# Patient Record
Sex: MALE | Race: WHITE | NOT HISPANIC OR LATINO | Employment: OTHER | URBAN - METROPOLITAN AREA
[De-identification: names, ages, dates, MRNs, and addresses within clinical notes are randomized per-mention and may not be internally consistent; named-entity substitution may affect disease eponyms.]

---

## 2017-01-09 RX ORDER — GABAPENTIN 300 MG/1
CAPSULE ORAL
Qty: 180 CAPSULE | Refills: 0 | Status: SHIPPED | OUTPATIENT
Start: 2017-01-09 | End: 2017-04-16 | Stop reason: SDUPTHER

## 2017-01-10 RX ORDER — DILTIAZEM HYDROCHLORIDE 120 MG/1
120 CAPSULE, COATED, EXTENDED RELEASE ORAL DAILY
Qty: 30 CAPSULE | Refills: 5 | Status: SHIPPED | OUTPATIENT
Start: 2017-01-10 | End: 2017-07-10 | Stop reason: SDUPTHER

## 2017-01-23 RX ORDER — PRAVASTATIN SODIUM 40 MG/1
TABLET ORAL
Qty: 90 TABLET | Refills: 0 | Status: SHIPPED | OUTPATIENT
Start: 2017-01-23 | End: 2017-04-16 | Stop reason: SDUPTHER

## 2017-02-07 RX ORDER — CELECOXIB 200 MG/1
200 CAPSULE ORAL 2 TIMES DAILY
Qty: 180 CAPSULE | Refills: 1 | Status: SHIPPED | OUTPATIENT
Start: 2017-02-07 | End: 2018-01-05 | Stop reason: SDUPTHER

## 2017-03-17 RX ORDER — LOSARTAN POTASSIUM 25 MG/1
TABLET ORAL
Qty: 90 TABLET | Refills: 0 | Status: SHIPPED | OUTPATIENT
Start: 2017-03-17 | End: 2017-06-11 | Stop reason: SDUPTHER

## 2017-04-17 RX ORDER — GABAPENTIN 300 MG/1
CAPSULE ORAL
Qty: 180 CAPSULE | Refills: 0 | Status: SHIPPED | OUTPATIENT
Start: 2017-04-17 | End: 2017-07-07 | Stop reason: SDUPTHER

## 2017-04-17 RX ORDER — PRAVASTATIN SODIUM 40 MG/1
TABLET ORAL
Qty: 90 TABLET | Refills: 0 | Status: SHIPPED | OUTPATIENT
Start: 2017-04-17 | End: 2017-07-30 | Stop reason: SDUPTHER

## 2017-05-01 ENCOUNTER — TELEPHONE (OUTPATIENT)
Dept: FAMILY MEDICINE | Facility: CLINIC | Age: 72
End: 2017-05-01

## 2017-05-01 DIAGNOSIS — I10 ESSENTIAL HYPERTENSION: ICD-10-CM

## 2017-05-01 DIAGNOSIS — Z79.4 TYPE 2 DIABETES MELLITUS WITH HYPERGLYCEMIA, WITH LONG-TERM CURRENT USE OF INSULIN: Primary | ICD-10-CM

## 2017-05-01 DIAGNOSIS — E11.65 TYPE 2 DIABETES MELLITUS WITH HYPERGLYCEMIA, WITH LONG-TERM CURRENT USE OF INSULIN: Primary | ICD-10-CM

## 2017-05-01 NOTE — TELEPHONE ENCOUNTER
----- Message from María eHnnessy sent at 5/1/2017 10:39 AM CDT -----  Contact: self/[phone]  Patient would like blood work orders but in the system for his upcoming appointment.  Please advise.

## 2017-05-08 ENCOUNTER — LAB VISIT (OUTPATIENT)
Dept: LAB | Facility: HOSPITAL | Age: 72
End: 2017-05-08
Attending: FAMILY MEDICINE
Payer: MEDICARE

## 2017-05-08 DIAGNOSIS — Z79.4 TYPE 2 DIABETES MELLITUS WITH HYPERGLYCEMIA, WITH LONG-TERM CURRENT USE OF INSULIN: ICD-10-CM

## 2017-05-08 DIAGNOSIS — I10 ESSENTIAL HYPERTENSION: ICD-10-CM

## 2017-05-08 DIAGNOSIS — E11.65 TYPE 2 DIABETES MELLITUS WITH HYPERGLYCEMIA, WITH LONG-TERM CURRENT USE OF INSULIN: ICD-10-CM

## 2017-05-08 LAB
ALBUMIN SERPL BCP-MCNC: 3.4 G/DL
ALP SERPL-CCNC: 95 U/L
ALT SERPL W/O P-5'-P-CCNC: 14 U/L
ANION GAP SERPL CALC-SCNC: 6 MMOL/L
AST SERPL-CCNC: 19 U/L
BASOPHILS # BLD AUTO: 0.04 K/UL
BASOPHILS NFR BLD: 0.7 %
BILIRUB SERPL-MCNC: 0.8 MG/DL
BUN SERPL-MCNC: 16 MG/DL
CALCIUM SERPL-MCNC: 8.9 MG/DL
CHLORIDE SERPL-SCNC: 106 MMOL/L
CHOLEST/HDLC SERPL: 2.3 {RATIO}
CO2 SERPL-SCNC: 29 MMOL/L
CREAT SERPL-MCNC: 1.1 MG/DL
DIFFERENTIAL METHOD: NORMAL
EOSINOPHIL # BLD AUTO: 0.3 K/UL
EOSINOPHIL NFR BLD: 4.9 %
ERYTHROCYTE [DISTWIDTH] IN BLOOD BY AUTOMATED COUNT: 12.7 %
EST. GFR  (AFRICAN AMERICAN): >60 ML/MIN/1.73 M^2
EST. GFR  (NON AFRICAN AMERICAN): >60 ML/MIN/1.73 M^2
GLUCOSE SERPL-MCNC: 150 MG/DL
HCT VFR BLD AUTO: 47.6 %
HDL/CHOLESTEROL RATIO: 43.6 %
HDLC SERPL-MCNC: 156 MG/DL
HDLC SERPL-MCNC: 68 MG/DL
HGB BLD-MCNC: 15.4 G/DL
LDLC SERPL CALC-MCNC: 69.6 MG/DL
LYMPHOCYTES # BLD AUTO: 1.8 K/UL
LYMPHOCYTES NFR BLD: 29.1 %
MCH RBC QN AUTO: 28.3 PG
MCHC RBC AUTO-ENTMCNC: 32.4 %
MCV RBC AUTO: 87 FL
MONOCYTES # BLD AUTO: 0.7 K/UL
MONOCYTES NFR BLD: 10.6 %
NEUTROPHILS # BLD AUTO: 3.3 K/UL
NEUTROPHILS NFR BLD: 54.5 %
NONHDLC SERPL-MCNC: 88 MG/DL
PLATELET # BLD AUTO: 212 K/UL
PMV BLD AUTO: 10.3 FL
POTASSIUM SERPL-SCNC: 4.3 MMOL/L
PROT SERPL-MCNC: 7 G/DL
RBC # BLD AUTO: 5.45 M/UL
SODIUM SERPL-SCNC: 141 MMOL/L
TRIGL SERPL-MCNC: 92 MG/DL
TSH SERPL DL<=0.005 MIU/L-ACNC: 1.02 UIU/ML
WBC # BLD AUTO: 6.12 K/UL

## 2017-05-08 PROCEDURE — 84443 ASSAY THYROID STIM HORMONE: CPT

## 2017-05-08 PROCEDURE — 36415 COLL VENOUS BLD VENIPUNCTURE: CPT | Mod: PO

## 2017-05-08 PROCEDURE — 80053 COMPREHEN METABOLIC PANEL: CPT

## 2017-05-08 PROCEDURE — 83036 HEMOGLOBIN GLYCOSYLATED A1C: CPT

## 2017-05-08 PROCEDURE — 80061 LIPID PANEL: CPT

## 2017-05-08 PROCEDURE — 85025 COMPLETE CBC W/AUTO DIFF WBC: CPT

## 2017-05-09 LAB
ESTIMATED AVG GLUCOSE: 232 MG/DL
HBA1C MFR BLD HPLC: 9.7 %

## 2017-05-12 ENCOUNTER — OFFICE VISIT (OUTPATIENT)
Dept: FAMILY MEDICINE | Facility: CLINIC | Age: 72
End: 2017-05-12
Payer: MEDICARE

## 2017-05-12 VITALS
SYSTOLIC BLOOD PRESSURE: 112 MMHG | OXYGEN SATURATION: 98 % | DIASTOLIC BLOOD PRESSURE: 72 MMHG | HEIGHT: 75 IN | BODY MASS INDEX: 27.55 KG/M2 | WEIGHT: 221.56 LBS | HEART RATE: 96 BPM

## 2017-05-12 DIAGNOSIS — Z79.4 TYPE 2 DIABETES MELLITUS WITH HYPERGLYCEMIA, WITH LONG-TERM CURRENT USE OF INSULIN: ICD-10-CM

## 2017-05-12 DIAGNOSIS — E11.65 TYPE 2 DIABETES MELLITUS WITH HYPERGLYCEMIA, WITH LONG-TERM CURRENT USE OF INSULIN: ICD-10-CM

## 2017-05-12 DIAGNOSIS — I10 ESSENTIAL HYPERTENSION: Primary | ICD-10-CM

## 2017-05-12 DIAGNOSIS — M26.69 TMJ LOCKING: ICD-10-CM

## 2017-05-12 DIAGNOSIS — J32.0 CHRONIC MAXILLARY SINUSITIS: ICD-10-CM

## 2017-05-12 PROCEDURE — 4010F ACE/ARB THERAPY RXD/TAKEN: CPT | Mod: S$GLB,,, | Performed by: FAMILY MEDICINE

## 2017-05-12 PROCEDURE — 99214 OFFICE O/P EST MOD 30 MIN: CPT | Mod: S$GLB,,, | Performed by: FAMILY MEDICINE

## 2017-05-12 PROCEDURE — 3078F DIAST BP <80 MM HG: CPT | Mod: S$GLB,,, | Performed by: FAMILY MEDICINE

## 2017-05-12 PROCEDURE — 99499 UNLISTED E&M SERVICE: CPT | Mod: S$GLB,,, | Performed by: FAMILY MEDICINE

## 2017-05-12 PROCEDURE — 1126F AMNT PAIN NOTED NONE PRSNT: CPT | Mod: S$GLB,,, | Performed by: FAMILY MEDICINE

## 2017-05-12 PROCEDURE — 1160F RVW MEDS BY RX/DR IN RCRD: CPT | Mod: S$GLB,,, | Performed by: FAMILY MEDICINE

## 2017-05-12 PROCEDURE — 99999 PR PBB SHADOW E&M-EST. PATIENT-LVL III: CPT | Mod: PBBFAC,,, | Performed by: FAMILY MEDICINE

## 2017-05-12 PROCEDURE — 3074F SYST BP LT 130 MM HG: CPT | Mod: S$GLB,,, | Performed by: FAMILY MEDICINE

## 2017-05-12 PROCEDURE — 1159F MED LIST DOCD IN RCRD: CPT | Mod: S$GLB,,, | Performed by: FAMILY MEDICINE

## 2017-05-12 PROCEDURE — 3046F HEMOGLOBIN A1C LEVEL >9.0%: CPT | Mod: S$GLB,,, | Performed by: FAMILY MEDICINE

## 2017-05-12 RX ORDER — AZITHROMYCIN 500 MG/1
500 TABLET, FILM COATED ORAL DAILY
Qty: 3 TABLET | Refills: 0 | Status: SHIPPED | OUTPATIENT
Start: 2017-05-12 | End: 2017-05-15

## 2017-05-12 RX ORDER — LEVOCETIRIZINE DIHYDROCHLORIDE 5 MG/1
5 TABLET, FILM COATED ORAL NIGHTLY
Qty: 30 TABLET | Refills: 0 | Status: SHIPPED | OUTPATIENT
Start: 2017-05-12 | End: 2021-05-26

## 2017-05-12 RX ORDER — INSULIN ASPART 100 [IU]/ML
20 INJECTION, SOLUTION INTRAVENOUS; SUBCUTANEOUS
Qty: 18 ML | Refills: 11 | Status: SHIPPED | OUTPATIENT
Start: 2017-05-12 | End: 2017-08-11 | Stop reason: SDUPTHER

## 2017-05-12 NOTE — PROGRESS NOTES
Subjective:       Patient ID: Kamar Muñoz is a 71 y.o. male.    Chief Complaint: Follow-up; Diabetes; and Hypertension    HPI Comments: 71 years old male came to the clinic for blood pressure check.  Blood pressure today stable.  Patient last A1c was elevated.  No polyuria polydipsia polyphagia.  Patient with chronic maxillary sinusitis associated with sinus pressure and general malaise.  Patient was using over-the-counter medicine.  No significant improvement for the last several months.  Patient also with TMJ locking sometimes.    Diabetes   Pertinent negatives for diabetes include no chest pain, no polydipsia, no polyphagia and no polyuria.   Hypertension   Pertinent negatives include no chest pain or palpitations.     Review of Systems   Constitutional: Negative.  Negative for chills and fever.   HENT: Positive for postnasal drip, rhinorrhea, sinus pressure and sneezing.    Eyes: Negative.    Respiratory: Negative.    Cardiovascular: Negative.  Negative for chest pain, palpitations and leg swelling.   Gastrointestinal: Negative.    Endocrine: Negative for cold intolerance, heat intolerance, polydipsia, polyphagia and polyuria.   Genitourinary: Negative.    Musculoskeletal: Negative.    Skin: Negative.    Neurological: Negative.    Psychiatric/Behavioral: Negative.        Objective:      Physical Exam   Constitutional: He is oriented to person, place, and time. He appears well-developed and well-nourished. No distress.   HENT:   Head: Normocephalic and atraumatic.   Right Ear: External ear normal.   Left Ear: External ear normal.   Nose: Rhinorrhea present. Right sinus exhibits no maxillary sinus tenderness and no frontal sinus tenderness. Left sinus exhibits maxillary sinus tenderness. Left sinus exhibits no frontal sinus tenderness.   Mouth/Throat: Oropharynx is clear and moist. No oropharyngeal exudate.   Eyes: Conjunctivae and EOM are normal. Pupils are equal, round, and reactive to light. Right eye  exhibits no discharge. Left eye exhibits no discharge. No scleral icterus.   Neck: Normal range of motion. Neck supple. No JVD present. No tracheal deviation present. No thyromegaly present.   Cardiovascular: Normal rate, regular rhythm, normal heart sounds and intact distal pulses.  Exam reveals no gallop and no friction rub.    No murmur heard.  Pulmonary/Chest: Effort normal and breath sounds normal. No stridor. No respiratory distress. He has no wheezes. He has no rales. He exhibits no tenderness.   Abdominal: Soft. Bowel sounds are normal. He exhibits no distension and no mass. There is no tenderness. There is no rebound and no guarding.   Musculoskeletal: Normal range of motion. He exhibits no edema or tenderness.   Lymphadenopathy:     He has no cervical adenopathy.   Neurological: He is alert and oriented to person, place, and time. He has normal reflexes. He displays normal reflexes. No cranial nerve deficit. He exhibits normal muscle tone. Coordination normal.   Skin: Skin is warm and dry. No rash noted. He is not diaphoretic. No erythema. No pallor.   Psychiatric: He has a normal mood and affect. His behavior is normal. Judgment and thought content normal.   Nursing note and vitals reviewed.      Assessment:       1. Essential hypertension    2. Type 2 diabetes mellitus with hyperglycemia, with long-term current use of insulin    3. TMJ locking    4. Chronic maxillary sinusitis        Plan:         Kamar was seen today for follow-up, diabetes and hypertension.    Diagnoses and all orders for this visit:    Essential hypertension    Type 2 diabetes mellitus with hyperglycemia, with long-term current use of insulin  -     insulin aspart (NOVOLOG FLEXPEN) 100 unit/mL InPn pen; Inject 20 Units into the skin 3 (three) times daily with meals.    TMJ locking  -     Ambulatory Referral to Oral Maxillofacial Surgery    Chronic maxillary sinusitis  -     azithromycin (ZITHROMAX) 500 MG tablet; Take 1 tablet (500  mg total) by mouth once daily.  -     levocetirizine (XYZAL) 5 MG tablet; Take 1 tablet (5 mg total) by mouth every evening.    Continue monitoring blood pressure at home, low sodium diet.  Continue monitoring blood sugar at home,ADA diet.

## 2017-05-12 NOTE — MR AVS SNAPSHOT
[facility]   [facility] [place] [address]  Phone: [phone]  Fax: [phone]                  Kamar Muñoz   2017 10:40 AM   Office Visit    Description:  Male : 1945   Provider:  Basim Guerrero MD   Department:  [facility]           Reason for Visit     Follow-up     Diabetes     Hypertension           Diagnoses this Visit        Comments    Essential hypertension    -  Primary     Type 2 diabetes mellitus with hyperglycemia, with long-term current use of insulin         TMJ locking         Chronic maxillary sinusitis                To Do List           Future Appointments        Provider Department Dept Phone    8/10/2017 9:00 AM LAB, [facility] [phone]    2017 10:00 AM Basim Guerrero MD [facility] [phone]      Goals (5 Years of Data)     None      Follow-Up and Disposition     Return in about 3 months (around 2017), or if symptoms worsen or fail to improve.       These Medications        Disp Refills Start End    insulin aspart (NOVOLOG FLEXPEN) 100 unit/mL InPn pen 18 mL 11 2017    Inject 20 Units into the skin 3 (three) times daily with meals. - Subcutaneous    Pharmacy: [facility] [address] [place] [address] [address] [facility] Ph #: [phone]       azithromycin (ZITHROMAX) 500 MG tablet 3 tablet 0 2017 5/15/2017    Take 1 tablet (500 mg total) by mouth once daily. - Oral    [facility] [address] [place] [address] [address] AT [facility] Ph #: [phone]       levocetirizine (XYZAL) 5 MG tablet 30 tablet 0 2017    Take 1 tablet (5 mg total) by mouth every evening. - Oral    Pharmacy: [facility] [address] [place], [place] [address] [address] [address] AT [facility] Ph #: [phone]         [facility]     [facility] Nurse Care Line - 24/7 Assistance  Unless otherwise directed by your provider, please contact [facility], our nurse care line that is available for 24/7 assistance.     Registered nurses in the [facility] provide: appointment scheduling, clinical advisement, health education, and other advisory services.  Call: [phone] (toll free)               Medications           Message regarding Medications     Verify the changes and/or additions to your medication regime listed below are the same as discussed with your clinician today.  If any of these changes or additions are incorrect, please notify your healthcare provider.        START taking these NEW medications        Refills    azithromycin (ZITHROMAX) 500 MG tablet 0    Sig: Take 1 tablet (500 mg total) by mouth once daily.    Class: Normal    Route: Oral    levocetirizine (XYZAL) 5 MG tablet 0    Sig: Take 1 tablet (5 mg total) by mouth every evening.    Class: Normal    Route: Oral      CHANGE how you are taking these medications     Start Taking Instead of    insulin aspart (NOVOLOG FLEXPEN) 100 unit/mL InPn pen insulin aspart (NOVOLOG FLEXPEN) 100 unit/mL InPn pen    Dosage:  Inject 20 Units into the skin 3 (three) times daily with meals. Dosage:  Inject 15 Units into the skin 3 (three) times daily with meals.    Reason for Change:  Reorder       STOP taking these medications     cetirizine (ZYRTEC) 10 MG tablet Take 1 tablet (10 mg total) by mouth once daily.           Verify that the below list of medications is an accurate representation of the medications you are currently taking.  If none reported, the list may be blank. If incorrect, please contact your healthcare provider. Carry this list with you in case of emergency.           Current Medications     aspirin (ECOTRIN) 81 MG EC tablet Take 81 mg by mouth once daily.    azithromycin (ZITHROMAX) 500 MG tablet Take 1 tablet (500 mg total) by mouth once daily.    celecoxib  "(CELEBREX) 200 MG capsule Take 1 capsule (200 mg total) by mouth 2 (two) times daily.    diltiaZEM (CARTIA XT) 120 MG Cp24 Take 1 capsule (120 mg total) by mouth once daily.    ergocalciferol (ERGOCALCIFEROL) 50,000 unit Cap     gabapentin (NEURONTIN) 300 MG capsule TAKE 1 CAPSULE BY MOUTH TWICE DAILY    hydrocodone-acetaminophen 10-325mg (NORCO)  mg Tab Take 1 tablet by mouth every 4 (four) hours as needed.    insulin aspart (NOVOLOG FLEXPEN) 100 unit/mL InPn pen Inject 20 Units into the skin 3 (three) times daily with meals.    insulin glargine (LANTUS) 100 unit/mL injection Inject 30 Units into the skin every evening.    insulin syringe-needle U-100 (INSULIN SYRINGE MICROFINE) 0.3 mL 28 gauge x 1/2" Syrg Use with Lantus    levocetirizine (XYZAL) 5 MG tablet Take 1 tablet (5 mg total) by mouth every evening.    losartan (COZAAR) 25 MG tablet TAKE 1 TABLET BY MOUTH DAILY    metformin (GLUCOPHAGE) 500 MG tablet Take 1 tablet (500 mg total) by mouth 2 (two) times daily with meals.    MULTIVITAMIN ORAL 1 tablet.    OMEPRAZOLE (PRILOSEC ORAL) Take 1 tablet by mouth daily as needed.     pen needle, diabetic (PEN NEEDLE) 30 gauge x 5/16" Ndle 1 Units by Misc.(Non-Drug; Combo Route) route 3 (three) times daily.    polycarbophil (FIBERCON) 625 mg tablet Take 1 tablet by mouth.    pravastatin (PRAVACHOL) 40 MG tablet TAKE 1 TABLET BY MOUTH EVERY DAY           Clinical Reference Information           Your Vitals Were     BP Pulse Height Weight SpO2 BMI    112/72 96 6' 3" (1.905 m) 100.5 kg (221 lb 9 oz) 98% 27.69 kg/m2      Blood Pressure          Most Recent Value    BP  112/72      Allergies as of 5/12/2017     Iodine      Immunizations Administered on Date of Encounter - 5/12/2017     None      Orders Placed During Today's Visit      Normal Orders This Visit    Ambulatory Referral to Oral Maxillofacial Surgery     Future Labs/Procedures Expected by Expires    Comprehensive metabolic panel  5/12/2017 8/10/2017    " Hemoglobin A1c  5/12/2017 8/10/2017    Lipid panel  5/12/2017 8/10/2017      [facility] Sign-Up     Activating your [facility] account is as easy as 1-2-3!     1) Visit [email or web address], select Sign Up Now, enter this activation code and your date of birth, then select Next.  [email or web address]  Expires: 6/26/2017 11:34 AM      2) Create a username and password to use when you visit [facility] in the future and select a security question in case you lose your password and select Next.    3) Enter your e-mail address and click Sign Up!    Additional Information  If you have questions, please e-mail [email or web address] or call [phone] to talk to our [facility] staff. Remember, [facility] is NOT to be used for urgent needs. For medical emergencies, dial 911.         Language Assistance Services     ATTENTION: Language assistance services are available, free of charge. Please call [phone].      ATENCIÓN: Si habla español, tiene a melendez disposición servicios gratuitos de asistencia lingüística. Llame al [phone].     CHÚ Ý: N?u b?n nói Ti?ng Vi?t, có các d?ch v? h? tr? ngôn ng? mi?n phí dành cho b?n. G?i s? [phone].         [facility] complies with applicable Federal civil rights laws and does not discriminate on the basis of race, color, national origin, age, disability, or sex.

## 2017-06-12 RX ORDER — LOSARTAN POTASSIUM 25 MG/1
TABLET ORAL
Qty: 90 TABLET | Refills: 0 | Status: SHIPPED | OUTPATIENT
Start: 2017-06-12 | End: 2017-09-07 | Stop reason: SDUPTHER

## 2017-07-07 RX ORDER — GABAPENTIN 300 MG/1
CAPSULE ORAL
Qty: 180 CAPSULE | Refills: 0 | Status: SHIPPED | OUTPATIENT
Start: 2017-07-07 | End: 2017-10-06 | Stop reason: SDUPTHER

## 2017-07-10 RX ORDER — DILTIAZEM HYDROCHLORIDE 120 MG/1
120 CAPSULE, COATED, EXTENDED RELEASE ORAL DAILY
Qty: 30 CAPSULE | Refills: 5 | Status: SHIPPED | OUTPATIENT
Start: 2017-07-10 | End: 2018-01-05 | Stop reason: SDUPTHER

## 2017-07-10 NOTE — TELEPHONE ENCOUNTER
----- Message from Judi Dee sent at 7/10/2017  4:43 PM CDT -----  Contact: Self/[phone]  Patient is requesting a refill on his diltiaZEM (CARTIA XT) 120 MG Cp24 sent to the pharmacy on file. .  Please advise

## 2017-07-10 NOTE — TELEPHONE ENCOUNTER
----- Message from Judi Dee sent at 7/10/2017  4:43 PM CDT -----  Contact: Self/[phone]  Patient is requesting a refill on his diltiaZEM (CARTIA XT) 120 MG Cp24 sent to the pharmacy on file. .  Please advise

## 2017-07-13 ENCOUNTER — HOSPITAL ENCOUNTER (EMERGENCY)
Facility: HOSPITAL | Age: 72
Discharge: HOME | End: 2017-07-13
Attending: EMERGENCY MEDICINE
Payer: MEDICARE

## 2017-07-13 ENCOUNTER — TELEPHONE (OUTPATIENT)
Dept: UROLOGY | Facility: CLINIC | Age: 72
End: 2017-07-13

## 2017-07-13 VITALS
DIASTOLIC BLOOD PRESSURE: 81 MMHG | RESPIRATION RATE: 18 BRPM | SYSTOLIC BLOOD PRESSURE: 158 MMHG | TEMPERATURE: 98 F | BODY MASS INDEX: 27.1 KG/M2 | HEART RATE: 75 BPM | HEIGHT: 75 IN | WEIGHT: 218 LBS | OXYGEN SATURATION: 97 %

## 2017-07-13 DIAGNOSIS — N20.0 RENAL STONES: Primary | ICD-10-CM

## 2017-07-13 DIAGNOSIS — R10.9 RIGHT FLANK PAIN: ICD-10-CM

## 2017-07-13 LAB
ALBUMIN SERPL BCP-MCNC: 3.5 G/DL
ALP SERPL-CCNC: 95 U/L
ALT SERPL W/O P-5'-P-CCNC: 12 U/L
ANION GAP SERPL CALC-SCNC: 8 MMOL/L
AST SERPL-CCNC: 20 U/L
BASOPHILS # BLD AUTO: 0.03 K/UL
BASOPHILS NFR BLD: 0.4 %
BILIRUB SERPL-MCNC: 0.7 MG/DL
BILIRUB UR QL STRIP: NEGATIVE
BUN SERPL-MCNC: 16 MG/DL
CALCIUM SERPL-MCNC: 8.8 MG/DL
CHLORIDE SERPL-SCNC: 104 MMOL/L
CLARITY UR: CLEAR
CO2 SERPL-SCNC: 23 MMOL/L
COLOR UR: YELLOW
CREAT SERPL-MCNC: 1.1 MG/DL
DIFFERENTIAL METHOD: NORMAL
EOSINOPHIL # BLD AUTO: 0.1 K/UL
EOSINOPHIL NFR BLD: 1 %
ERYTHROCYTE [DISTWIDTH] IN BLOOD BY AUTOMATED COUNT: 12.2 %
EST. GFR  (AFRICAN AMERICAN): >60 ML/MIN/1.73 M^2
EST. GFR  (NON AFRICAN AMERICAN): >60 ML/MIN/1.73 M^2
GLUCOSE SERPL-MCNC: 170 MG/DL
GLUCOSE SERPL-MCNC: 190 MG/DL (ref 70–110)
GLUCOSE UR QL STRIP: NEGATIVE
HCT VFR BLD AUTO: 43.7 %
HGB BLD-MCNC: 14.2 G/DL
HGB UR QL STRIP: ABNORMAL
KETONES UR QL STRIP: NEGATIVE
LEUKOCYTE ESTERASE UR QL STRIP: NEGATIVE
LIPASE SERPL-CCNC: 19 U/L
LYMPHOCYTES # BLD AUTO: 1.8 K/UL
LYMPHOCYTES NFR BLD: 26.3 %
MCH RBC QN AUTO: 27.4 PG
MCHC RBC AUTO-ENTMCNC: 32.5 %
MCV RBC AUTO: 84 FL
MONOCYTES # BLD AUTO: 0.5 K/UL
MONOCYTES NFR BLD: 6.8 %
NEUTROPHILS # BLD AUTO: 4.4 K/UL
NEUTROPHILS NFR BLD: 65.4 %
NITRITE UR QL STRIP: NEGATIVE
PH UR STRIP: 6 [PH] (ref 5–8)
PLATELET # BLD AUTO: 178 K/UL
PMV BLD AUTO: 9.6 FL
POTASSIUM SERPL-SCNC: 4.8 MMOL/L
PROT SERPL-MCNC: 6.9 G/DL
PROT UR QL STRIP: NEGATIVE
RBC # BLD AUTO: 5.19 M/UL
SODIUM SERPL-SCNC: 135 MMOL/L
SP GR UR STRIP: 1.01 (ref 1–1.03)
URN SPEC COLLECT METH UR: ABNORMAL
UROBILINOGEN UR STRIP-ACNC: NEGATIVE EU/DL
WBC # BLD AUTO: 6.73 K/UL

## 2017-07-13 PROCEDURE — 85025 COMPLETE CBC W/AUTO DIFF WBC: CPT

## 2017-07-13 PROCEDURE — 96375 TX/PRO/DX INJ NEW DRUG ADDON: CPT

## 2017-07-13 PROCEDURE — 80053 COMPREHEN METABOLIC PANEL: CPT

## 2017-07-13 PROCEDURE — 96374 THER/PROPH/DIAG INJ IV PUSH: CPT

## 2017-07-13 PROCEDURE — 96376 TX/PRO/DX INJ SAME DRUG ADON: CPT

## 2017-07-13 PROCEDURE — 81003 URINALYSIS AUTO W/O SCOPE: CPT

## 2017-07-13 PROCEDURE — 99284 EMERGENCY DEPT VISIT MOD MDM: CPT | Mod: 25

## 2017-07-13 PROCEDURE — 63600175 PHARM REV CODE 636 W HCPCS: Performed by: NURSE PRACTITIONER

## 2017-07-13 PROCEDURE — 96372 THER/PROPH/DIAG INJ SC/IM: CPT

## 2017-07-13 PROCEDURE — 25000003 PHARM REV CODE 250: Performed by: NURSE PRACTITIONER

## 2017-07-13 PROCEDURE — 82962 GLUCOSE BLOOD TEST: CPT

## 2017-07-13 PROCEDURE — 83690 ASSAY OF LIPASE: CPT

## 2017-07-13 RX ORDER — HYDROCODONE BITARTRATE AND ACETAMINOPHEN 5; 325 MG/1; MG/1
1 TABLET ORAL EVERY 6 HOURS PRN
Qty: 10 TABLET | Refills: 0 | Status: SHIPPED | OUTPATIENT
Start: 2017-07-13 | End: 2018-11-26

## 2017-07-13 RX ORDER — HYDROMORPHONE HYDROCHLORIDE 1 MG/ML
0.5 INJECTION, SOLUTION INTRAMUSCULAR; INTRAVENOUS; SUBCUTANEOUS
Status: COMPLETED | OUTPATIENT
Start: 2017-07-13 | End: 2017-07-13

## 2017-07-13 RX ORDER — KETOROLAC TROMETHAMINE 10 MG/1
10 TABLET, FILM COATED ORAL 2 TIMES DAILY PRN
Qty: 20 TABLET | Refills: 0 | Status: SHIPPED | OUTPATIENT
Start: 2017-07-13 | End: 2018-04-16

## 2017-07-13 RX ORDER — MORPHINE SULFATE 2 MG/ML
4 INJECTION, SOLUTION INTRAMUSCULAR; INTRAVENOUS
Status: COMPLETED | OUTPATIENT
Start: 2017-07-13 | End: 2017-07-13

## 2017-07-13 RX ORDER — ONDANSETRON 2 MG/ML
4 INJECTION INTRAMUSCULAR; INTRAVENOUS
Status: COMPLETED | OUTPATIENT
Start: 2017-07-13 | End: 2017-07-13

## 2017-07-13 RX ORDER — KETOROLAC TROMETHAMINE 30 MG/ML
15 INJECTION, SOLUTION INTRAMUSCULAR; INTRAVENOUS
Status: COMPLETED | OUTPATIENT
Start: 2017-07-13 | End: 2017-07-13

## 2017-07-13 RX ORDER — HALOPERIDOL 5 MG/ML
5 INJECTION INTRAMUSCULAR
Status: COMPLETED | OUTPATIENT
Start: 2017-07-13 | End: 2017-07-13

## 2017-07-13 RX ADMIN — HYDROMORPHONE HYDROCHLORIDE 0.5 MG: 1 INJECTION, SOLUTION INTRAMUSCULAR; INTRAVENOUS; SUBCUTANEOUS at 04:07

## 2017-07-13 RX ADMIN — ONDANSETRON 4 MG: 2 INJECTION INTRAMUSCULAR; INTRAVENOUS at 03:07

## 2017-07-13 RX ADMIN — HALOPERIDOL LACTATE 5 MG: 5 INJECTION, SOLUTION INTRAMUSCULAR at 04:07

## 2017-07-13 RX ADMIN — ONDANSETRON 4 MG: 2 INJECTION INTRAMUSCULAR; INTRAVENOUS at 02:07

## 2017-07-13 RX ADMIN — MORPHINE SULFATE 4 MG: 2 INJECTION, SOLUTION INTRAMUSCULAR; INTRAVENOUS at 03:07

## 2017-07-13 RX ADMIN — KETOROLAC TROMETHAMINE 15 MG: 30 INJECTION, SOLUTION INTRAMUSCULAR at 02:07

## 2017-07-13 RX ADMIN — SODIUM CHLORIDE 1000 ML: 0.9 INJECTION, SOLUTION INTRAVENOUS at 03:07

## 2017-07-13 NOTE — TELEPHONE ENCOUNTER
----- Message from Willow Barksdale sent at 7/13/2017 12:35 PM CDT -----  Contact: self, [phone]  Patient states his wife called earlier today requesting for him to be seen today due to being in pain and has not received a call back. Patient states he will just go to the hospital.

## 2017-07-13 NOTE — ED PROVIDER NOTES
Encounter Date: 7/13/2017       History     Chief Complaint   Patient presents with    Flank Pain     right flank pain     71-year-old male reports right-sided flank pain times one week that worsened today.  Patient reports a history of kidney stones and states this feels similar.  Denies nausea or vomiting.  Denies burning or pain with urination.  Last kidney stone one year ago and lithotripsy one year ago by Dr. Daly.  Patient has not had any kidney problems since that time.  Denies history of kidney failure.  Patient denies abdominal pain, fever, chills, body aches.  Patient states he initially thought that the symptoms were due to a pulled muscle occasionally does lifting at work however pain seems to be intensified with rest.  Has not taken anything for pain.          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 8/2/2012    Type II or unspecified type diabetes mellitus with neurological manifestations, uncontrolled 3/8/2013    Urinary tract infection      Past Surgical History:   Procedure Laterality Date    BACK SURGERY      EYE SURGERY      HERNIA REPAIR      renal stones      SHOULDER OPEN ROTATOR CUFF REPAIR       Family History   Problem Relation Age of Onset    Prostate cancer Neg Hx     Kidney disease Neg Hx      Social History   Substance Use Topics    Smoking status: Former Smoker     Quit date: 1/1/1983    Smokeless tobacco: Never Used    Alcohol use No     Review of Systems   Constitutional: Negative for fever.   HENT: Negative for sore throat.    Respiratory: Negative for shortness of breath.    Cardiovascular: Negative for chest pain.   Gastrointestinal:  Negative for abdominal pain and nausea.   Genitourinary: Positive for flank pain. Negative for decreased urine volume, dysuria, hematuria, testicular pain and urgency.   Musculoskeletal: Negative for back pain.   Skin: Negative for rash.   Neurological: Negative for weakness.   Hematological: Does not bruise/bleed easily.   All other systems reviewed and are negative.      Physical Exam     Initial Vitals [07/13/17 1312]   BP Pulse Resp Temp SpO2   (!) 198/86 79 18 97.6 °F (36.4 °C) 97 %      MAP       123.33         Physical Exam    Nursing note and vitals reviewed.  Constitutional: He appears well-developed and well-nourished. He is not diaphoretic. No distress.   HENT:   Head: Normocephalic and atraumatic.   Eyes: Conjunctivae are normal.   Neck: Normal range of motion. Neck supple.   Cardiovascular: Normal rate and regular rhythm.   Pulmonary/Chest: Breath sounds normal. No respiratory distress. He exhibits no tenderness.   Abdominal: Soft. He exhibits no distension. There is no tenderness. There is no rigidity, no rebound and no guarding.   Musculoskeletal: Normal range of motion. He exhibits no tenderness.        Thoracic back: He exhibits pain (right paraspinal). He exhibits normal range of motion, no tenderness, no bony tenderness and no spasm.   Neurological: He is alert and oriented to person, place, and time. He has normal strength. No cranial nerve deficit or sensory deficit.   Skin: Skin is warm and dry. Capillary refill takes less than 2 seconds.   Psychiatric: He has a normal mood and affect. His behavior is normal. Judgment and thought content normal.         ED Course   Procedures  Labs Reviewed   COMPREHENSIVE METABOLIC PANEL - Abnormal; Notable for the following:        Result Value    Sodium 135 (*)     Glucose 170 (*)     All other components within normal limits   URINALYSIS - Abnormal; Notable for the following:     Occult Blood UA Trace (*)     All other components within normal limits   CBC  W/ AUTO DIFFERENTIAL   LIPASE     Imaging Results          CT Renal Stone Study ABD Pelvis WO (Final result)  Result time 07/13/17 14:37:57    Final result by Matt Pace MD (07/13/17 14:37:57)                 Impression:      1.  Bilateral nonobstructing renal stones.    2.  Otherwise no acute intra-abdominal findings, specifically no evidence of bowel obstruction or obstructive uropathy.    3.  Scattered colonic diverticula without evidence of diverticulitis.    4.  Additional stable findings as detailed above.        Electronically signed by: MATT PACE MD  Date:     07/13/17  Time:    14:37              Narrative:    Clinical indication: 71-year-old male with abdominal pain.    Comparison: CT renal stone study 8/2016.    Technique: Transaxial images were obtained through the abdomen and pelvis in 5 mm increments without the use of p.o. or IV contrast.    Findings:  The visualized portion of the heart is unremarkable.  Emphysematous changes are seen within the lung bases.    No focal hepatic abnormality seen on this noncontrast exam.  There is no intra-or extrahepatic biliary ductal dilatation.  The gallbladder is unremarkable.  The stomach, pancreas, spleen, and adrenal glands are unremarkable.    There are multiple punctate bilateral renal stones, at least 5 on the left and 3 on the right.  Left renal hypodensities are again visualized, probable cysts.  No evidence of hydronephrosis.  No stone seen along the ureteral courses.  Urinary bladder is unremarkable.      Aorta tapers normally.  Appendix not definitely visualized, however no inflammatory changes seen within the right lower quadrant.  There are scattered colonic diverticula without evidence of diverticulitis.  The visualized loops of small and large bowel show no evidence of obstruction or inflammation.  There is no ascites, free fluid, or intraperitoneal free air.    Degenerative changes are visualized within the spine, most severe at the L2-3  and L3-4 levels.  Significant degenerative changes also visualized involving the spinous processes similar to prior exam.  Subcutaneous soft tissue structures are unremarkable.                                    Medical Decision Making:   Initial Assessment:   71-year-old male reports right-sided flank pain times one week that worsened today.  Patient reports a history of kidney stones and states this feels similar.  Denies nausea or vomiting.  Denies burning or pain with urination.  Last kidney stone one year ago and lithotripsy one year ago by Dr. Daly.  Patient has not had any kidney problems since that time.  Denies history of kidney failure.  Patient denies abdominal pain, fever, chills, body aches.  Patient states he initially thought that the symptoms were due to a pulled muscle occasionally does lifting at work however pain seems to be intensified with rest.  Has not taken anything for pain.  Last bowel movement this morning patient reports urinated one hour ago.  Differential Diagnosis:   Renal stones, muscle spasms, chronic back pain, lumbar radiculopathy, UTI  Clinical Tests:   Lab Tests: Ordered and Reviewed  Radiological Study: Ordered and Reviewed  ED Management:  Patient has multiple bilateral renal stones on CT.  Will prescribe pain medication and nausea medication encouraged patient to stay hydrated and follow-up with Dr. [place].  If symptoms worsen return to emergency department.              Attending Attestation:     Physician Attestation Statement for NP/PA:   I have conducted a face to face encounter with this patient in addition to the NP/PA, due to NP/PA Request    Other NP/PA Attestation Additions:    History of Present Illness: 71-year-old male presents the emergency department complaining of right-sided flank pain.  Patient reports onset a week ago, and he reports he initially thought that was a pulled muscle.  It is somewhat worse with certain movements, no alleviating factors reported  patient has not taken any medication for these symptoms.  No nausea or vomiting.  Patient reports the pain is worsened and is similar to prior kidney stone pain, prompting him to come to the emergency department.  No dysuria or fever or hematuria   Physical Exam: Agree   Medical Decision Making: Agree; multiple bilateral renal stones, nonobstructing; patient given IV fluid and symptomatically management in the emergency department.  He is feeling better, tolerating by mouth, stable vital signs, instructed to follow-up with his primary care physician, urologist, given prescription for pain medicine and nausea medicine, instructed to return with new or worsening symptoms                 ED Course     Clinical Impression:   The primary encounter diagnosis was Renal stones. A diagnosis of Right flank pain was also pertinent to this visit.    Disposition:   Disposition: Discharged  Condition: Stable                        Anh Davenport NP  07/13/17 3732       Anoop Penn MD  07/14/17 1021

## 2017-07-13 NOTE — ED NOTES
Pt sitting up in bed, AAO x's 3. Pt stated that he came to the ER with c/o   APPEARANCE: Alert, oriented and in no acute distress.  CARDIAC: Normal rate and rhythm, no murmur heard.   PERIPHERAL VASCULAR: peripheral pulses present. Normal cap refill. No edema. Warm to touch.    RESPIRATORY:Normal rate and effort, breath sounds clear bilaterally throughout chest. Respirations are equal and unlabored no obvious signs of distress.  GASTRO: soft, bowel sounds normal, no tenderness, no abdominal distention.  MUSC: Full ROM. No bony tenderness or soft tissue tenderness. No obvious deformity.  SKIN: Skin is warm and dry, normal skin turgor, mucous membranes moist.  NEURO: 5/5 strength major flexors/extensors bilaterally. Sensory intact to light touch bilaterally. Ovid coma scale: eyes open spontaneously-4, oriented & converses-5, obeys commands-6. No neurological abnormalities.   MENTAL STATUS: awake, alert and aware of environment.  EYE: PERRL, both eyes: pupils brisk and reactive to light. Normal size.  ENT: EARS: no obvious drainage. NOSE: no active bleeding.

## 2017-07-14 LAB — POCT GLUCOSE: 190 MG/DL (ref 70–110)

## 2017-07-20 ENCOUNTER — OFFICE VISIT (OUTPATIENT)
Dept: UROLOGY | Facility: CLINIC | Age: 72
End: 2017-07-20
Payer: MEDICARE

## 2017-07-20 VITALS
RESPIRATION RATE: 16 BRPM | BODY MASS INDEX: 27.11 KG/M2 | SYSTOLIC BLOOD PRESSURE: 127 MMHG | HEIGHT: 75 IN | DIASTOLIC BLOOD PRESSURE: 78 MMHG | WEIGHT: 218.06 LBS | HEART RATE: 87 BPM

## 2017-07-20 DIAGNOSIS — N20.0 KIDNEY STONES: Primary | ICD-10-CM

## 2017-07-20 PROCEDURE — 1126F AMNT PAIN NOTED NONE PRSNT: CPT | Mod: S$GLB,,, | Performed by: UROLOGY

## 2017-07-20 PROCEDURE — 99214 OFFICE O/P EST MOD 30 MIN: CPT | Mod: S$GLB,,, | Performed by: UROLOGY

## 2017-07-20 PROCEDURE — 99999 PR PBB SHADOW E&M-EST. PATIENT-LVL III: CPT | Mod: PBBFAC,,, | Performed by: UROLOGY

## 2017-07-20 PROCEDURE — 1159F MED LIST DOCD IN RCRD: CPT | Mod: S$GLB,,, | Performed by: UROLOGY

## 2017-07-20 NOTE — PROGRESS NOTES
Subjective:      Patient ID: Kamar Muñoz is a 71 y.o. male.    Chief Complaint: ER f/u (nephrolithias)    Patient is a 71 y.o. male who is established to our clinic and was initially referred by their PCP, Dr. Sheth for evaluation of kidney stones.     HPI   He returns today for review and follow-up of his kidney stones.  He recently was in the ED with right flank pain.  Rated as 8/10.  Sudden in onset.  No nausea/vomiting.  No gross hematuria.  No fevers/chills.  No dysuria.    Pain meds seemed to help.  Lasted for 2 days and then improved.       Of note, he's previously undergone a left ureteroscopy on August 31, 2016.  He had multiple distal left ureteral stones and renal stones at that time.  He recently underwent a CT scan.  CT scan was independently reviewed today and reveals nonobstructing bilateral renal stones.  No hydronephrosis.  No ureteral stones.      Review of Systems   All other systems reviewed and negative except pertinent positives noted in HPI.    Objective:     Physical Exam   Constitutional: He is oriented to person, place, and time. He appears well-developed and well-nourished. No distress.   HENT:   Head: Normocephalic and atraumatic.   Eyes: No scleral icterus.   Neck: No tracheal deviation present.   Pulmonary/Chest: Effort normal. No respiratory distress.   Neurological: He is alert and oriented to person, place, and time.   Psychiatric: He has a normal mood and affect. His behavior is normal. Judgment and thought content normal.     Assessment:     1. Kidney stones      Plan:     1. Kidney stone:  -General risk factors for kidney stones and the conservative measures to prevent kidney stones in the future were discussed with the patient in detail.  The patient was encouraged to drink 2-3 liters of water a day, limit iced tea and latisha as well as foods high in oxalate.  They were cautioned to try to limit salt and red meat intake.  We also discussed adding citrate to the diet with  the addition of renetta or lemon juice to their water or alternatively with crystal light.   -Imaging review: As above, CT scan with nonobstructing bilateral renal stones.  -24 hour urine: reviewed prior study from 9/2016.  Completely normal.  Monitor for now.

## 2017-07-31 RX ORDER — PRAVASTATIN SODIUM 40 MG/1
TABLET ORAL
Qty: 90 TABLET | Refills: 0 | Status: SHIPPED | OUTPATIENT
Start: 2017-07-31 | End: 2017-10-29 | Stop reason: SDUPTHER

## 2017-08-10 ENCOUNTER — LAB VISIT (OUTPATIENT)
Dept: LAB | Facility: HOSPITAL | Age: 72
End: 2017-08-10
Attending: FAMILY MEDICINE
Payer: MEDICARE

## 2017-08-10 DIAGNOSIS — E11.65 TYPE 2 DIABETES MELLITUS WITH HYPERGLYCEMIA, WITH LONG-TERM CURRENT USE OF INSULIN: ICD-10-CM

## 2017-08-10 DIAGNOSIS — Z79.4 TYPE 2 DIABETES MELLITUS WITH HYPERGLYCEMIA, WITH LONG-TERM CURRENT USE OF INSULIN: ICD-10-CM

## 2017-08-10 DIAGNOSIS — I10 ESSENTIAL HYPERTENSION: ICD-10-CM

## 2017-08-10 LAB
ALBUMIN SERPL BCP-MCNC: 3.5 G/DL
ALP SERPL-CCNC: 91 U/L
ALT SERPL W/O P-5'-P-CCNC: 12 U/L
ANION GAP SERPL CALC-SCNC: 5 MMOL/L
AST SERPL-CCNC: 17 U/L
BILIRUB SERPL-MCNC: 0.7 MG/DL
BUN SERPL-MCNC: 17 MG/DL
CALCIUM SERPL-MCNC: 8.7 MG/DL
CHLORIDE SERPL-SCNC: 105 MMOL/L
CHOLEST/HDLC SERPL: 2.5 {RATIO}
CO2 SERPL-SCNC: 29 MMOL/L
CREAT SERPL-MCNC: 1.1 MG/DL
EST. GFR  (AFRICAN AMERICAN): >60 ML/MIN/1.73 M^2
EST. GFR  (NON AFRICAN AMERICAN): >60 ML/MIN/1.73 M^2
ESTIMATED AVG GLUCOSE: 217 MG/DL
GLUCOSE SERPL-MCNC: 147 MG/DL
HBA1C MFR BLD HPLC: 9.2 %
HDL/CHOLESTEROL RATIO: 40.7 %
HDLC SERPL-MCNC: 140 MG/DL
HDLC SERPL-MCNC: 57 MG/DL
LDLC SERPL CALC-MCNC: 66.6 MG/DL
NONHDLC SERPL-MCNC: 83 MG/DL
POTASSIUM SERPL-SCNC: 4.3 MMOL/L
PROT SERPL-MCNC: 6.7 G/DL
SODIUM SERPL-SCNC: 139 MMOL/L
TRIGL SERPL-MCNC: 82 MG/DL

## 2017-08-10 PROCEDURE — 83036 HEMOGLOBIN GLYCOSYLATED A1C: CPT

## 2017-08-10 PROCEDURE — 80053 COMPREHEN METABOLIC PANEL: CPT

## 2017-08-10 PROCEDURE — 36415 COLL VENOUS BLD VENIPUNCTURE: CPT | Mod: PO

## 2017-08-10 PROCEDURE — 80061 LIPID PANEL: CPT

## 2017-08-11 ENCOUNTER — OFFICE VISIT (OUTPATIENT)
Dept: FAMILY MEDICINE | Facility: CLINIC | Age: 72
End: 2017-08-11
Payer: MEDICARE

## 2017-08-11 VITALS
BODY MASS INDEX: 27.41 KG/M2 | HEART RATE: 95 BPM | DIASTOLIC BLOOD PRESSURE: 74 MMHG | SYSTOLIC BLOOD PRESSURE: 120 MMHG | WEIGHT: 220.44 LBS | HEIGHT: 75 IN

## 2017-08-11 DIAGNOSIS — E11.65 TYPE 2 DIABETES MELLITUS WITH HYPERGLYCEMIA, WITH LONG-TERM CURRENT USE OF INSULIN: ICD-10-CM

## 2017-08-11 DIAGNOSIS — Z79.4 TYPE 2 DIABETES MELLITUS WITH DIABETIC AUTONOMIC NEUROPATHY, WITH LONG-TERM CURRENT USE OF INSULIN: ICD-10-CM

## 2017-08-11 DIAGNOSIS — Z79.4 TYPE 2 DIABETES MELLITUS WITH HYPERGLYCEMIA, WITH LONG-TERM CURRENT USE OF INSULIN: ICD-10-CM

## 2017-08-11 DIAGNOSIS — E11.43 TYPE 2 DIABETES MELLITUS WITH DIABETIC AUTONOMIC NEUROPATHY, WITH LONG-TERM CURRENT USE OF INSULIN: ICD-10-CM

## 2017-08-11 DIAGNOSIS — I10 ESSENTIAL HYPERTENSION: Primary | ICD-10-CM

## 2017-08-11 DIAGNOSIS — J32.8 OTHER CHRONIC SINUSITIS: ICD-10-CM

## 2017-08-11 PROCEDURE — 3078F DIAST BP <80 MM HG: CPT | Mod: S$GLB,,, | Performed by: FAMILY MEDICINE

## 2017-08-11 PROCEDURE — 99499 UNLISTED E&M SERVICE: CPT | Mod: S$GLB,,, | Performed by: FAMILY MEDICINE

## 2017-08-11 PROCEDURE — 99999 PR PBB SHADOW E&M-EST. PATIENT-LVL III: CPT | Mod: PBBFAC,,, | Performed by: FAMILY MEDICINE

## 2017-08-11 PROCEDURE — 3008F BODY MASS INDEX DOCD: CPT | Mod: S$GLB,,, | Performed by: FAMILY MEDICINE

## 2017-08-11 PROCEDURE — 3074F SYST BP LT 130 MM HG: CPT | Mod: S$GLB,,, | Performed by: FAMILY MEDICINE

## 2017-08-11 PROCEDURE — 3046F HEMOGLOBIN A1C LEVEL >9.0%: CPT | Mod: S$GLB,,, | Performed by: FAMILY MEDICINE

## 2017-08-11 PROCEDURE — 1159F MED LIST DOCD IN RCRD: CPT | Mod: S$GLB,,, | Performed by: FAMILY MEDICINE

## 2017-08-11 PROCEDURE — 1126F AMNT PAIN NOTED NONE PRSNT: CPT | Mod: S$GLB,,, | Performed by: FAMILY MEDICINE

## 2017-08-11 PROCEDURE — 99214 OFFICE O/P EST MOD 30 MIN: CPT | Mod: S$GLB,,, | Performed by: FAMILY MEDICINE

## 2017-08-11 PROCEDURE — 4010F ACE/ARB THERAPY RXD/TAKEN: CPT | Mod: S$GLB,,, | Performed by: FAMILY MEDICINE

## 2017-08-11 RX ORDER — INSULIN ASPART 100 [IU]/ML
24 INJECTION, SOLUTION INTRAVENOUS; SUBCUTANEOUS
Qty: 21.6 ML | Refills: 11 | Status: SHIPPED | OUTPATIENT
Start: 2017-08-11 | End: 2018-06-29 | Stop reason: SDUPTHER

## 2017-08-11 NOTE — PROGRESS NOTES
Subjective:       Patient ID: Kamar Muñoz is a 71 y.o. male.    Chief Complaint: Follow-up    71 years old male came to the clinic for blood pressure and diabetes check.  Blood pressure today stable.  No chest pain palpitations orthopnea or PND.  Patient with elevated A1c.  No polyuria polydipsia or polyphagia.  Patient also with chronic sinusitis for the last year.  He reports partial improvement with ALLERGY treatment.      Review of Systems   Constitutional: Negative.    HENT: Negative.    Eyes: Negative.    Respiratory: Negative.    Cardiovascular: Negative.  Negative for chest pain, palpitations and leg swelling.   Gastrointestinal: Negative.    Endocrine: Negative for cold intolerance, heat intolerance, polydipsia, polyphagia and polyuria.   Genitourinary: Negative.    Musculoskeletal: Negative.    Skin: Negative.    Neurological: Negative.    Psychiatric/Behavioral: Negative.        Objective:      Physical Exam   Constitutional: He is oriented to person, place, and time. He appears well-developed and well-nourished. No distress.   HENT:   Head: Normocephalic and atraumatic.   Right Ear: External ear normal.   Left Ear: External ear normal.   Nose: Rhinorrhea present.   Mouth/Throat: Oropharynx is clear and moist. No oropharyngeal exudate.   Eyes: Conjunctivae and EOM are normal. Pupils are equal, round, and reactive to light. Right eye exhibits no discharge. Left eye exhibits no discharge. No scleral icterus.   Neck: Normal range of motion. Neck supple. No JVD present. No tracheal deviation present. No thyromegaly present.   Cardiovascular: Normal rate, regular rhythm, normal heart sounds and intact distal pulses.  Exam reveals no gallop and no friction rub.    No murmur heard.  Pulmonary/Chest: Effort normal and breath sounds normal. No stridor. No respiratory distress. He has no wheezes. He has no rales. He exhibits no tenderness.   Abdominal: Soft. Bowel sounds are normal. He exhibits no distension  and no mass. There is no tenderness. There is no rebound and no guarding.   Musculoskeletal: Normal range of motion. He exhibits no edema or tenderness.   Lymphadenopathy:     He has no cervical adenopathy.   Neurological: He is alert and oriented to person, place, and time. He has normal reflexes. He displays normal reflexes. No cranial nerve deficit. He exhibits normal muscle tone. Coordination normal.   Skin: Skin is warm and dry. No rash noted. He is not diaphoretic. No erythema. No pallor.   Psychiatric: He has a normal mood and affect. His behavior is normal. Judgment and thought content normal.   Nursing note and vitals reviewed.      Assessment:       1. Essential hypertension    2. Type 2 diabetes mellitus with hyperglycemia, with long-term current use of insulin    3. Type 2 diabetes mellitus with diabetic autonomic neuropathy, with long-term current use of insulin    4. Other chronic sinusitis        Plan:         Kamar was seen today for follow-up.    Diagnoses and all orders for this visit:    Essential hypertension  -     Comprehensive metabolic panel; Future    Type 2 diabetes mellitus with hyperglycemia, with long-term current use of insulin  -     insulin aspart (NOVOLOG FLEXPEN) 100 unit/mL InPn pen; Inject 24 Units into the skin 3 (three) times daily with meals.  -     Hemoglobin A1c; Future    Type 2 diabetes mellitus with diabetic autonomic neuropathy, with long-term current use of insulin  -     Hemoglobin A1c; Future    Other chronic sinusitis  -     Ambulatory referral to ENT    Continue monitoring blood pressure at home, low sodium diet.   Diet and physical activity to promote weight loss.  Continue monitoring blood sugar at home,ADA diet.

## 2017-09-07 RX ORDER — LOSARTAN POTASSIUM 25 MG/1
TABLET ORAL
Qty: 90 TABLET | Refills: 0 | Status: SHIPPED | OUTPATIENT
Start: 2017-09-07 | End: 2017-12-10 | Stop reason: SDUPTHER

## 2017-09-22 ENCOUNTER — TELEPHONE (OUTPATIENT)
Dept: OTOLARYNGOLOGY | Facility: CLINIC | Age: 72
End: 2017-09-22

## 2017-09-22 ENCOUNTER — OFFICE VISIT (OUTPATIENT)
Dept: OTOLARYNGOLOGY | Facility: CLINIC | Age: 72
End: 2017-09-22
Payer: MEDICARE

## 2017-09-22 VITALS
TEMPERATURE: 97 F | WEIGHT: 224.13 LBS | HEIGHT: 75 IN | DIASTOLIC BLOOD PRESSURE: 80 MMHG | SYSTOLIC BLOOD PRESSURE: 132 MMHG | HEART RATE: 92 BPM | BODY MASS INDEX: 27.87 KG/M2

## 2017-09-22 DIAGNOSIS — J34.89 NASAL OBSTRUCTION: ICD-10-CM

## 2017-09-22 DIAGNOSIS — J30.89 CHRONIC NON-SEASONAL ALLERGIC RHINITIS, UNSPECIFIED TRIGGER: ICD-10-CM

## 2017-09-22 DIAGNOSIS — J34.3 HYPERTROPHY OF INFERIOR NASAL TURBINATE: ICD-10-CM

## 2017-09-22 DIAGNOSIS — J34.2 NASAL SEPTAL DEVIATION: Primary | ICD-10-CM

## 2017-09-22 PROCEDURE — 99999 PR PBB SHADOW E&M-EST. PATIENT-LVL IV: CPT | Mod: PBBFAC,,, | Performed by: OTOLARYNGOLOGY

## 2017-09-22 PROCEDURE — 3008F BODY MASS INDEX DOCD: CPT | Mod: S$GLB,,, | Performed by: OTOLARYNGOLOGY

## 2017-09-22 PROCEDURE — 1126F AMNT PAIN NOTED NONE PRSNT: CPT | Mod: S$GLB,,, | Performed by: OTOLARYNGOLOGY

## 2017-09-22 PROCEDURE — 3075F SYST BP GE 130 - 139MM HG: CPT | Mod: S$GLB,,, | Performed by: OTOLARYNGOLOGY

## 2017-09-22 PROCEDURE — 3079F DIAST BP 80-89 MM HG: CPT | Mod: S$GLB,,, | Performed by: OTOLARYNGOLOGY

## 2017-09-22 PROCEDURE — 99204 OFFICE O/P NEW MOD 45 MIN: CPT | Mod: S$GLB,,, | Performed by: OTOLARYNGOLOGY

## 2017-09-22 PROCEDURE — 1159F MED LIST DOCD IN RCRD: CPT | Mod: S$GLB,,, | Performed by: OTOLARYNGOLOGY

## 2017-09-22 RX ORDER — BENZOCAINE .13; .15; .5; 2 G/100G; G/100G; G/100G; G/100G
1 GEL ORAL DAILY
Qty: 8.6 G | Refills: 11 | Status: SHIPPED | OUTPATIENT
Start: 2017-09-22 | End: 2018-11-26

## 2017-09-22 RX ORDER — DOXYCYCLINE HYCLATE 100 MG
TABLET ORAL
Refills: 1 | Status: ON HOLD | COMMUNITY
Start: 2017-07-28 | End: 2018-03-07

## 2017-09-22 RX ORDER — MOMETASONE FUROATE 50 UG/1
2 SPRAY, METERED NASAL DAILY
Qty: 17 G | Refills: 12 | Status: SHIPPED | OUTPATIENT
Start: 2017-09-22 | End: 2017-09-22

## 2017-09-22 NOTE — TELEPHONE ENCOUNTER
----- Message from Kalya Valente sent at 9/22/2017 12:15 PM CDT -----  Contact: Self/ [phone]  Patient would like to speak with you about a prescription he couldn't get at the pharmacy. Please advise.

## 2017-09-22 NOTE — LETTER
September 22, 2017      Basim Guerrero MD  [address]  [place] [address]           [facility]  [address] [place] [address], [address]  [place] [address]  Phone: [phone]  Fax: [phone]          Patient: Kamar Muñoz   MR Number: [id number]   YOB: 1945   Date of Visit: 9/22/2017       Dear Dr. Basim Guerrero:    Thank you for referring Kamar Muñoz to me for evaluation. Attached you will find relevant portions of my assessment and plan of care.    If you have questions, please do not hesitate to call me. I look forward to following Kamar Muñoz along with you.    Sincerely,    aNt Zaidi MD    Enclosure  CC:  No Recipients    If you would like to receive this communication electronically, please contact [email or web address] or [phone] to request more information on [facility] Link access.    For providers and/or their staff who would like to refer a patient to [facility], please contact us through our one-stop-shop provider referral line, [facility], at [phone].    If you feel you have received this communication in error or would no longer like to receive these types of communications, please e-mail [email or web address]

## 2017-09-22 NOTE — PROGRESS NOTES
Chief Complaint   Patient presents with    Sinus Problem     pt was referred by Dr Mijares to be seen by an ENT    Nasal Congestion   .    HPI:     Kamar Muñoz is a 72 y.o. male who presents for evaluation of a several year history of nasal congestion, rhinorrhea, and sneezing, and postnasal drip. He describes difficulty breathing at night. There is left nasal obstruction. He does not use sinus rinses or nasal sprays. He reports midface pain and pressure on the left is intermittent.  He admits to rhinorrhea and postnasal drip. There is not maxillary tooth pain. He  admits to headaches.  He has not had sinus or nasal surgery. There is no history of sinonasal trauma.      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 8/2/2012    Type II or unspecified type diabetes mellitus with neurological manifestations, uncontrolled 3/8/2013    Urinary tract infection      Social History     Social History    Marital status:      Spouse name: N/A    Number of children: N/A    Years of education: N/A     Occupational History    Not on file.     Social History Main Topics    Smoking status: Former Smoker     Quit date: 1/1/1983    Smokeless tobacco: Never Used    Alcohol use No    Drug use: No    Sexual activity: Not Currently     Other Topics Concern    Not on file     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     Past Surgical History:   Procedure Laterality Date    BACK SURGERY      EYE SURGERY      HERNIA REPAIR      renal stones      SHOULDER OPEN ROTATOR CUFF REPAIR       Family History   Problem Relation Age of Onset    Prostate cancer Neg Hx     Kidney disease Neg Hx            Review of Systems  General: negative for chills, fever or weight loss  Psychological: negative for  mood changes or depression  Ophthalmic: negative for blurry vision, photophobia or eye pain  ENT: see HPI  Respiratory: no cough, shortness of breath, or wheezing  Cardiovascular: no chest pain or dyspnea on exertion  Gastrointestinal: no abdominal pain, change in bowel habits, or black/ bloody stools  Musculoskeletal: negative for gait disturbance or muscular weakness  Neurological: no syncope or seizures; no ataxia  Dermatological: negative for puritis,  rash and jaundice  Hematologic/lymphatic: no easy bruising, no new lumps or bumps      Physical Exam:    Vitals:    09/22/17 0937   BP: 132/80   Pulse: 92   Temp: 97 °F (36.1 °C)       Constitutional: Well appearing / communicating without difficutly.  NAD.  Eyes: EOM I Bilaterally  Head/Face: Normocephalic.  Negative paranasal sinus pressure/tenderness.  Salivary glands WNL.  House Brackmann I Bilaterally.    Right Ear: Auricle normal appearance. External Auditory Canal within normal limits,TM w/o masses/lesions/perforations. TM mobility noted.   Left Ear: Auricle normal appearance. External Auditory Canal WNL,TM w/o masses/lesions/perforations. TM mobility noted.  Nose: No gross nasal septal deviation. Inferior Turbinates 3+ bilaterally. No septal perforation. No masses/lesions. External nasal skin appears normal without masses/lesions.  Oral Cavity: Gingiva/lips within normal limits.  Dentition/gingiva healthy appearing. Mucus membranes moist. Floor of mouth soft, no masses palpated. Oral Tongue mobile. Hard Palate appears normal.    Oropharynx: Base of tongue appears normal. No masses/lesions noted. Tonsillar fossa/pharyngeal wall without lesions. Posterior oropharynx WNL.  Soft palate without masses. Midline uvula.   Neck/Lymphatic: No LAD I-VI bilaterally.  No thyromegaly.  No masses noted on exam.    Mirror laryngoscopy/nasopharyngoscopy: Active gag reflex.  Unable to perform.    Neuro/Psychiatric: AOx3.  Normal mood and affect.   Cardiovascular: Normal  carotid pulses bilaterally, no increasing jugular venous distention noted at cervical region bilaterally.    Respiratory: Normal respiratory effort, no stridor, no retractions noted.      Assessment:    ICD-10-CM ICD-9-CM    1. Nasal septal deviation J34.2 470    2. Hypertrophy of inferior nasal turbinate J34.3 478.0    3. Nasal obstruction J34.89 478.19    4. Chronic non-seasonal allergic rhinitis, unspecified trigger J30.89 477.9      The primary encounter diagnosis was Nasal septal deviation. Diagnoses of Hypertrophy of inferior nasal turbinate, Nasal obstruction, and Chronic non-seasonal allergic rhinitis, unspecified trigger were also pertinent to this visit.      Plan:  No orders of the defined types were placed in this encounter.    We discussed that he has nasal septal deviation to the left along with inferior turbinate hypertrophy likely due to chronic allergic rhinitis.  I have prescribed mometasone nasal spray and have instructed him on proper use. We did discuss that he is a candidate for septoplasty/SMRT versus continued medical management.  He would like to continue to try medical options at this time. Follow up in 4-6 weeks to assess his response to therapy.    Thank you kindly for allowing me to participate in the patient's care.     Nat Zaidi MD

## 2017-09-22 NOTE — TELEPHONE ENCOUNTER
Spoke with patient, the Nasonex he was prescribed was $200 and he does not want to pay for this amount. He was wanting to know if there is a cheaper medication or he could do Flonase OTC. Please further advise

## 2017-10-04 DIAGNOSIS — E11.9 DIABETES MELLITUS WITHOUT COMPLICATION: ICD-10-CM

## 2017-10-06 DIAGNOSIS — E11.9 DIABETES MELLITUS WITHOUT COMPLICATION: ICD-10-CM

## 2017-10-07 RX ORDER — GABAPENTIN 300 MG/1
CAPSULE ORAL
Qty: 180 CAPSULE | Refills: 0 | Status: SHIPPED | OUTPATIENT
Start: 2017-10-07 | End: 2018-01-03 | Stop reason: SDUPTHER

## 2017-10-20 ENCOUNTER — OFFICE VISIT (OUTPATIENT)
Dept: OTOLARYNGOLOGY | Facility: CLINIC | Age: 72
End: 2017-10-20
Payer: MEDICARE

## 2017-10-20 VITALS
HEIGHT: 75 IN | BODY MASS INDEX: 27.46 KG/M2 | DIASTOLIC BLOOD PRESSURE: 79 MMHG | TEMPERATURE: 98 F | WEIGHT: 220.81 LBS | SYSTOLIC BLOOD PRESSURE: 126 MMHG | HEART RATE: 81 BPM

## 2017-10-20 DIAGNOSIS — J34.89 NASAL OBSTRUCTION: ICD-10-CM

## 2017-10-20 DIAGNOSIS — J34.3 HYPERTROPHY OF INFERIOR NASAL TURBINATE: ICD-10-CM

## 2017-10-20 DIAGNOSIS — J34.2 NASAL SEPTAL DEVIATION: Primary | ICD-10-CM

## 2017-10-20 PROCEDURE — 99213 OFFICE O/P EST LOW 20 MIN: CPT | Mod: S$GLB,,, | Performed by: OTOLARYNGOLOGY

## 2017-10-20 PROCEDURE — 99999 PR PBB SHADOW E&M-EST. PATIENT-LVL IV: CPT | Mod: PBBFAC,,, | Performed by: OTOLARYNGOLOGY

## 2017-10-20 RX ORDER — PENICILLIN V POTASSIUM 500 MG/1
TABLET, FILM COATED ORAL
Refills: 0 | COMMUNITY
Start: 2017-09-29 | End: 2018-11-26

## 2017-10-20 RX ORDER — BENZOCAINE .13; .15; .5; 2 G/100G; G/100G; G/100G; G/100G
1 GEL ORAL DAILY
Qty: 8.6 G | Refills: 11 | Status: SHIPPED | OUTPATIENT
Start: 2017-10-20 | End: 2018-11-26

## 2017-10-20 NOTE — PROGRESS NOTES
Chief Complaint   Patient presents with    Follow-up    Nasal Congestion   .    HPI:     Kamar Muñoz is a 72 y.o. male who presents for evaluation of a several year history of nasal congestion, rhinorrhea, and sneezing, and postnasal drip. He describes difficulty breathing at night. There is left nasal obstruction. He does not use sinus rinses or nasal sprays. He reports midface pain and pressure on the left is intermittent.  He admits to rhinorrhea and postnasal drip. There is not maxillary tooth pain. He  admits to headaches.  He has not had sinus or nasal surgery. There is no history of sinonasal trauma.    Interval HPI: Since last visit the patient has been on nasal saline irrigations since last visit. He could not obtain the mometasone due to insurance coverage.  He states that his symptoms are the same. No better or worse.       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     Social History Main Topics    Smoking status: Former Smoker     Quit date: 1/1/1983    Smokeless tobacco: Never Used    Alcohol use No    Drug use: No    Sexual activity: Not Currently     Other Topics Concern    Not on file     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     Past Surgical History:   Procedure Laterality Date    BACK SURGERY      EYE SURGERY      HERNIA REPAIR      renal stones      SHOULDER OPEN ROTATOR CUFF REPAIR       Family History   Problem Relation  Age of Onset    Prostate cancer Neg Hx     Kidney disease Neg Hx            Review of Systems  General: negative for chills, fever or weight loss  Psychological: negative for mood changes or depression  Ophthalmic: negative for blurry vision, photophobia or eye pain  ENT: see HPI  Respiratory: no cough, shortness of breath, or wheezing  Cardiovascular: no chest pain or dyspnea on exertion  Gastrointestinal: no abdominal pain, change in bowel habits, or black/ bloody stools  Musculoskeletal: negative for gait disturbance or muscular weakness  Neurological: no syncope or seizures; no ataxia  Dermatological: negative for puritis,  rash and jaundice  Hematologic/lymphatic: no easy bruising, no new lumps or bumps      Physical Exam:    Vitals:    10/20/17 1122   BP: 126/79   Pulse: 81   Temp: 97.7 °F (36.5 °C)       Constitutional: Well appearing / communicating without difficutly.  NAD.  Eyes: EOM I Bilaterally  Head/Face: Normocephalic.  Negative paranasal sinus pressure/tenderness.  Salivary glands WNL.  House Brackmann I Bilaterally.    Right Ear: Auricle normal appearance. External Auditory Canal within normal limits,TM w/o masses/lesions/perforations. TM mobility noted.   Left Ear: Auricle normal appearance. External Auditory Canal WNL,TM w/o masses/lesions/perforations. TM mobility noted.  Nose: No gross nasal septal deviation. Inferior Turbinates 3+ bilaterally. No septal perforation. No masses/lesions. External nasal skin appears normal without masses/lesions.  Oral Cavity: Gingiva/lips within normal limits.  Dentition/gingiva healthy appearing. Mucus membranes moist. Floor of mouth soft, no masses palpated. Oral Tongue mobile. Hard Palate appears normal.    Oropharynx: Base of tongue appears normal. No masses/lesions noted. Tonsillar fossa/pharyngeal wall without lesions. Posterior oropharynx WNL.  Soft palate without masses. Midline uvula.   Neck/Lymphatic: No LAD I-VI bilaterally.  No thyromegaly.  No  masses noted on exam.    Mirror laryngoscopy/nasopharyngoscopy: Active gag reflex.  Unable to perform.    Neuro/Psychiatric: AOx3.  Normal mood and affect.   Cardiovascular: Normal carotid pulses bilaterally, no increasing jugular venous distention noted at cervical region bilaterally.    Respiratory: Normal respiratory effort, no stridor, no retractions noted.      Assessment:    ICD-10-CM ICD-9-CM    1. Nasal septal deviation J34.2 470    2. Hypertrophy of inferior nasal turbinate J34.3 478.0    3. Nasal obstruction J34.89 478.19      The primary encounter diagnosis was Nasal septal deviation. Diagnoses of Hypertrophy of inferior nasal turbinate and Nasal obstruction were also pertinent to this visit.      Plan:  No orders of the defined types were placed in this encounter.    We discussed that he has nasal septal deviation to the left along with inferior turbinate hypertrophy likely due to chronic allergic rhinitis.  I have prescribed budesonide nasal spray and have instructed him on proper use. We did discuss that he is a candidate for septoplasty/SMRT versus continued medical management.  He would like to continue to try medical options at this time. Follow up in 6 weeks to assess his response to therapy.    Thank you kindly for allowing me to participate in the patient's care.     Nat Zaidi MD

## 2017-10-27 DIAGNOSIS — Z79.4 TYPE 2 DIABETES MELLITUS WITH STAGE 3 CHRONIC KIDNEY DISEASE, WITH LONG-TERM CURRENT USE OF INSULIN: ICD-10-CM

## 2017-10-27 DIAGNOSIS — Z79.4 TYPE 2 DIABETES MELLITUS WITH HYPERGLYCEMIA, WITH LONG-TERM CURRENT USE OF INSULIN: ICD-10-CM

## 2017-10-27 DIAGNOSIS — E11.65 TYPE 2 DIABETES MELLITUS WITH HYPERGLYCEMIA, WITH LONG-TERM CURRENT USE OF INSULIN: ICD-10-CM

## 2017-10-27 DIAGNOSIS — E11.22 TYPE 2 DIABETES MELLITUS WITH STAGE 3 CHRONIC KIDNEY DISEASE, WITH LONG-TERM CURRENT USE OF INSULIN: ICD-10-CM

## 2017-10-27 DIAGNOSIS — N18.30 TYPE 2 DIABETES MELLITUS WITH STAGE 3 CHRONIC KIDNEY DISEASE, WITH LONG-TERM CURRENT USE OF INSULIN: ICD-10-CM

## 2017-10-27 RX ORDER — METFORMIN HYDROCHLORIDE 500 MG/1
500 TABLET ORAL 2 TIMES DAILY WITH MEALS
Qty: 180 TABLET | Refills: 1 | Status: SHIPPED | OUTPATIENT
Start: 2017-10-27 | End: 2018-04-20 | Stop reason: SDUPTHER

## 2017-10-29 RX ORDER — PRAVASTATIN SODIUM 40 MG/1
TABLET ORAL
Qty: 90 TABLET | Refills: 0 | Status: SHIPPED | OUTPATIENT
Start: 2017-10-29 | End: 2018-01-21 | Stop reason: SDUPTHER

## 2017-11-27 RX ORDER — INSULIN GLARGINE 100 [IU]/ML
30 INJECTION, SOLUTION SUBCUTANEOUS NIGHTLY
Qty: 9 ML | Refills: 11 | Status: SHIPPED | OUTPATIENT
Start: 2017-11-27 | End: 2018-06-29 | Stop reason: SDUPTHER

## 2017-12-11 RX ORDER — LOSARTAN POTASSIUM 25 MG/1
TABLET ORAL
Qty: 90 TABLET | Refills: 0 | Status: SHIPPED | OUTPATIENT
Start: 2017-12-11 | End: 2018-03-11 | Stop reason: SDUPTHER

## 2018-01-03 RX ORDER — GABAPENTIN 300 MG/1
CAPSULE ORAL
Qty: 180 CAPSULE | Refills: 0 | Status: SHIPPED | OUTPATIENT
Start: 2018-01-03 | End: 2018-04-08 | Stop reason: SDUPTHER

## 2018-01-05 RX ORDER — CELECOXIB 200 MG/1
200 CAPSULE ORAL 2 TIMES DAILY
Qty: 180 CAPSULE | Refills: 0 | Status: SHIPPED | OUTPATIENT
Start: 2018-01-05 | End: 2018-02-16

## 2018-01-05 RX ORDER — DILTIAZEM HYDROCHLORIDE 120 MG/1
120 CAPSULE, COATED, EXTENDED RELEASE ORAL DAILY
Qty: 90 CAPSULE | Refills: 0 | Status: SHIPPED | OUTPATIENT
Start: 2018-01-05 | End: 2018-04-12 | Stop reason: SDUPTHER

## 2018-01-22 RX ORDER — PRAVASTATIN SODIUM 40 MG/1
TABLET ORAL
Qty: 90 TABLET | Refills: 0 | Status: SHIPPED | OUTPATIENT
Start: 2018-01-22 | End: 2018-04-15 | Stop reason: SDUPTHER

## 2018-02-16 ENCOUNTER — OFFICE VISIT (OUTPATIENT)
Dept: FAMILY MEDICINE | Facility: CLINIC | Age: 73
End: 2018-02-16
Payer: MEDICARE

## 2018-02-16 VITALS
DIASTOLIC BLOOD PRESSURE: 78 MMHG | HEART RATE: 85 BPM | OXYGEN SATURATION: 96 % | HEIGHT: 75 IN | SYSTOLIC BLOOD PRESSURE: 120 MMHG

## 2018-02-16 DIAGNOSIS — M54.12 CERVICAL RADICULOPATHY: ICD-10-CM

## 2018-02-16 DIAGNOSIS — E11.65 TYPE 2 DIABETES MELLITUS WITH HYPERGLYCEMIA, WITH LONG-TERM CURRENT USE OF INSULIN: ICD-10-CM

## 2018-02-16 DIAGNOSIS — I10 ESSENTIAL HYPERTENSION: Primary | ICD-10-CM

## 2018-02-16 DIAGNOSIS — Z79.4 TYPE 2 DIABETES MELLITUS WITH HYPERGLYCEMIA, WITH LONG-TERM CURRENT USE OF INSULIN: ICD-10-CM

## 2018-02-16 DIAGNOSIS — E16.2 HYPOGLYCEMIA: ICD-10-CM

## 2018-02-16 DIAGNOSIS — M54.2 NECK PAIN OF OVER 3 MONTHS DURATION: ICD-10-CM

## 2018-02-16 DIAGNOSIS — E78.5 DYSLIPIDEMIA: ICD-10-CM

## 2018-02-16 PROCEDURE — 99499 UNLISTED E&M SERVICE: CPT | Mod: S$GLB,,, | Performed by: FAMILY MEDICINE

## 2018-02-16 PROCEDURE — 99999 PR PBB SHADOW E&M-EST. PATIENT-LVL III: CPT | Mod: PBBFAC,,, | Performed by: FAMILY MEDICINE

## 2018-02-16 PROCEDURE — 99214 OFFICE O/P EST MOD 30 MIN: CPT | Mod: S$GLB,,, | Performed by: FAMILY MEDICINE

## 2018-02-16 PROCEDURE — 3008F BODY MASS INDEX DOCD: CPT | Mod: S$GLB,,, | Performed by: FAMILY MEDICINE

## 2018-02-16 PROCEDURE — 1159F MED LIST DOCD IN RCRD: CPT | Mod: S$GLB,,, | Performed by: FAMILY MEDICINE

## 2018-02-16 RX ORDER — DICLOFENAC SODIUM 50 MG/1
50 TABLET, DELAYED RELEASE ORAL 2 TIMES DAILY
Qty: 60 TABLET | Refills: 0 | Status: SHIPPED | OUTPATIENT
Start: 2018-02-16 | End: 2018-03-18

## 2018-02-16 RX ORDER — CYCLOBENZAPRINE HCL 5 MG
5 TABLET ORAL 3 TIMES DAILY PRN
Qty: 30 TABLET | Refills: 0 | Status: SHIPPED | OUTPATIENT
Start: 2018-02-16 | End: 2018-02-26

## 2018-02-16 NOTE — PATIENT INSTRUCTIONS
Diabetes: Activity Tips    Being more active can help you manage your diabetes. The tips on this sheet can help you get the most from your exercise. They can also help you stay safe.  Staying Active  Its important for adults to spend less time sitting and being inactive. This is especially true if you have type 2 diabetes. When you are sitting for long periods of time, get up for short sessions of light activity every 30 minutes.  You should aim for at least 150 minutes a week of exercise or physical activity. Dont let more than 2 days go by without being active.  Benefit from briskness  Brisk activity gets your heart beating faster. This can help you increase your fitness, lose extra weight, and manage your blood sugar level. Try brisk walking. Or, if you have foot or leg problems, you can try swimming or bike riding. You can break up your exercise into chunks throughout the day. Work up to at least 30 minutes of steady, brisk exercise on most days.  Warm up and cool down  Warming up and cooling down reduce your risk of injury. They also help limit muscle soreness. Do a mild version of your activity for 5 minutes before and after your routine. You can also learn stretches that will help keep your muscles loose. Your healthcare provider may show you good ways to warm up and stretch.  Do the talk-sing test  The talk-sing test is a simple way to tell how hard youre exercising. If you can talk while exercising, youre in a safe range. If youre out of breath, slow down. If you can carry a tune, its time to  the pace. Walk up a hill. Increase the resistance on your stationary bike. Or swim faster.  What about eating?  You may be told to plan your exercise for 1 to 2 hours after a meal. In most cases, you dont need to eat while being active. If you take insulin or medicine that can cause low blood sugar, test your blood sugar before exercising. And carry a fast-acting sugar that will raise your blood sugar  level quickly. This includes glucose tablets or hard candy. Use it if you feel low blood sugar symptoms.  Safety tips  These tips can help you stay safe as you become fit:  · Exercise with a friend or carry a cell phone if you have one.  · Carry or wear identification, such as a necklace or bracelet, that says you have diabetes.  · Use the proper footwear and safety equipment for your activity.  · Drink water before, during, and after exercise.  · Dress properly for the weather.  · Dont exercise in very hot or very cold weather.  · Dont exercise if you are sick.  · If you are instructed to do so, test your blood sugar before and after you exercise. Have a small carbohydrate snack if your blood sugar is low before you start exercising.   When to stop exercising and call your healthcare provider  Stop exercising and call your healthcare provider right away if you notice any of the following:  · Pain, pressure, tightness, or heaviness in the chest  · Pain or heaviness in the neck, shoulders, back, arms, legs, or feet  · Unusual shortness of breath  · Dizziness or lightheadedness  · Unusually rapid or slow pulse  · Increased joint or muscle pain  · Nausea or vomiting  Date Last Reviewed: 5/1/2016  © 8736-4100 [employer]. [address] [place] [address], [place] [address]. All rights reserved. This information is not intended as a substitute for professional medical care. Always follow your healthcare professional's instructions.

## 2018-02-16 NOTE — MEDICAL/APP STUDENT
Subjective:       Patient ID: Kamar Muñoz is a 72 y.o. male.    Chief Complaint: No chief complaint on file.    Mr. Muñoz is a 73 yo M that presents today for T2DM follow-up.      Neck Pain    This is a new problem. The current episode started more than 1 month ago. The problem occurs constantly. The problem has been unchanged. The pain is present in the left side and occipital region. The quality of the pain is described as aching. The pain is at a severity of 7/10. The symptoms are aggravated by twisting. Associated symptoms include tingling (localized to shoulder). Pertinent negatives include no chest pain, fever, headaches, numbness or visual change. Treatments tried: Silan pads and biofreeze. The treatment provided no relief.   Diabetes   He presents for his follow-up diabetic visit. He has type 2 diabetes mellitus. Hypoglycemia symptoms include dizziness, sweats and tremors. Pertinent negatives for hypoglycemia include no headaches. (Last episode was yesterday) Associated symptoms include polyuria. Pertinent negatives for diabetes include no chest pain, no polydipsia, no polyphagia and no visual change. Current diabetic treatment includes oral agent (monotherapy) and insulin injections (24U of Lantus, 16U of Novolog w/ meals). He is compliant with treatment all of the time. He is following a low salt diet. Meal planning includes avoidance of concentrated sweets. He participates in exercise daily. (Doesn't check BGL regularly. Usually only checks when he feels it is getting low. Has about 3 episodes of hypoglycemia/wk.) An ACE inhibitor/angiotensin II receptor blocker is being taken. He does not see a podiatrist.Eye exam is current.     Review of Systems   Constitutional: Negative for activity change, appetite change, chills and fever.   HENT: Positive for postnasal drip. Negative for congestion and sore throat.    Eyes: Negative for visual disturbance.   Respiratory: Negative for cough, shortness of  breath and wheezing.    Cardiovascular: Negative for chest pain, palpitations and leg swelling.   Gastrointestinal: Negative for abdominal pain, blood in stool, constipation, diarrhea, nausea and vomiting.   Endocrine: Positive for polyuria. Negative for polydipsia and polyphagia.   Genitourinary: Negative for difficulty urinating, dysuria and hematuria.   Musculoskeletal: Positive for neck pain. Negative for arthralgias, joint swelling and myalgias.   Skin: Negative for rash.   Neurological: Positive for dizziness, tingling (localized to shoulder) and tremors. Negative for numbness and headaches.       Objective:      Physical Exam   Constitutional: He is oriented to person, place, and time. He appears well-developed and well-nourished. No distress.   HENT:   Right Ear: External ear normal.   Left Ear: External ear normal.   Nose: Nose normal.   Mouth/Throat: Oropharynx is clear and moist. No oropharyngeal exudate.   Eyes: Conjunctivae and EOM are normal. Pupils are equal, round, and reactive to light.   Neck: No tracheal deviation present.   ROM restricted on rotation.   Cardiovascular: Normal rate, regular rhythm, normal heart sounds and intact distal pulses.    No murmur heard.  Pulses:       Dorsalis pedis pulses are 2+ on the right side, and 2+ on the left side.        Posterior tibial pulses are 2+ on the right side, and 2+ on the left side.   Pulmonary/Chest: Effort normal and breath sounds normal. No respiratory distress. He has no wheezes. He has no rales.   Abdominal: Soft. Bowel sounds are normal. He exhibits no distension and no mass. There is no tenderness. There is no guarding.   Musculoskeletal: Normal range of motion. He exhibits no edema, tenderness or deformity.        Right foot: There is no deformity.        Left foot: There is no deformity.   Feet:   Right Foot:   Protective Sensation: 10 sites tested. 10 sites sensed.   Skin Integrity: Negative for ulcer, skin breakdown or erythema.   Left  Foot:   Protective Sensation: 10 sites tested. 10 sites sensed.   Skin Integrity: Negative for ulcer, skin breakdown or erythema.   Lymphadenopathy:     He has no cervical adenopathy.   Neurological: He is alert and oriented to person, place, and time. He displays normal reflexes. No cranial nerve deficit or sensory deficit. He exhibits normal muscle tone. Coordination normal.         Plan:       Essential hypertension  Well-controlled. Continue w/ current regimen.  -     Comprehensive metabolic panel; Future  -     Lipid panel; Future    Type 2 diabetes mellitus with hyperglycemia, with long-term current use of insulin  Multiple episodes of symptomatic hypoglycemia/wk  Last HbA1c (8/10/17) = 9.2  Will decrease Novolog to 10U w/ meals  -     Hemoglobin A1c; Future  -     Microalbumin/creatinine urine ratio; Future    Hypoglycemia  -     Comprehensive metabolic panel; Future    Neck pain of over 3 months duration  -     Ambulatory referral to Pain Clinic  -     MRI Cervical Spine Without Contrast; Future  -     cyclobenzaprine (FLEXERIL) 5 MG tablet; Take 1 tablet (5 mg total) by mouth 3 (three) times daily as needed.  Dispense: 30 tablet; Refill: 0  -     diclofenac (VOLTAREN) 50 MG EC tablet; Take 1 tablet (50 mg total) by mouth 2 (two) times daily.  Dispense: 60 tablet; Refill: 0    Cervical radiculopathy  -     Ambulatory referral to Pain Clinic  -     MRI Cervical Spine Without Contrast; Future    Dyslipidemia  -     Comprehensive metabolic panel; Future  -     Lipid panel; Future

## 2018-02-16 NOTE — PROGRESS NOTES
Subjective:       Patient ID: Kamar Muñoz is a 72 y.o. male.    Chief Complaint: No chief complaint on file.    72 years old male came to the clinic for blood pressure check.  Blood pressure today stable.  No chest pain palpitations orthopnea or PND.  Patient with no recent A1c.  Patient reports episodic hypoglycemia after taking the NovoLog.  No polyuria polydipsia polyphagia.  Patient with chronic neck pain for the last 3 months localized over the left side and tingling over the left upper back.      Review of Systems   Constitutional: Negative.    HENT: Negative.    Eyes: Negative.    Respiratory: Negative.    Cardiovascular: Negative.  Negative for chest pain, palpitations and leg swelling.   Gastrointestinal: Negative.    Endocrine: Negative for polydipsia, polyphagia and polyuria.   Genitourinary: Negative.    Musculoskeletal: Positive for back pain.   Skin: Negative.    Neurological: Negative.    Psychiatric/Behavioral: Negative.        Objective:      Physical Exam   Constitutional: He is oriented to person, place, and time. He appears well-developed and well-nourished. No distress.   HENT:   Head: Normocephalic and atraumatic.   Right Ear: External ear normal.   Left Ear: External ear normal.   Nose: Nose normal.   Mouth/Throat: Oropharynx is clear and moist. No oropharyngeal exudate.   Eyes: Conjunctivae and EOM are normal. Pupils are equal, round, and reactive to light. Right eye exhibits no discharge. Left eye exhibits no discharge. No scleral icterus.   Neck: Normal range of motion. Neck supple. No JVD present. No tracheal deviation present. No thyromegaly present.   Cardiovascular: Normal rate, regular rhythm, normal heart sounds and intact distal pulses.  Exam reveals no gallop and no friction rub.    No murmur heard.  Pulmonary/Chest: Effort normal and breath sounds normal. No stridor. No respiratory distress. He has no wheezes. He has no rales. He exhibits no tenderness.   Abdominal: Soft.  Bowel sounds are normal. He exhibits no distension and no mass. There is no tenderness. There is no rebound and no guarding.   Musculoskeletal: He exhibits no edema.        Cervical back: He exhibits decreased range of motion, tenderness and pain.   Lymphadenopathy:     He has no cervical adenopathy.   Neurological: He is alert and oriented to person, place, and time. He has normal reflexes. He displays normal reflexes. No cranial nerve deficit. He exhibits normal muscle tone. Coordination normal.   Skin: Skin is warm and dry. No rash noted. He is not diaphoretic. No erythema. No pallor.   Psychiatric: He has a normal mood and affect. His behavior is normal. Judgment and thought content normal.   Nursing note and vitals reviewed.      Assessment:       1. Essential hypertension    2. Type 2 diabetes mellitus with hyperglycemia, with long-term current use of insulin    3. Hypoglycemia    4. Neck pain of over 3 months duration    5. Cervical radiculopathy    6. Dyslipidemia        Plan:         Diagnoses and all orders for this visit:    Essential hypertension  -     Comprehensive metabolic panel; Future  -     Lipid panel; Future    Type 2 diabetes mellitus with hyperglycemia, with long-term current use of insulin  -     Hemoglobin A1c; Future  -     Microalbumin/creatinine urine ratio; Future    Hypoglycemia  -     Comprehensive metabolic panel; Future    Neck pain of over 3 months duration  -     Ambulatory referral to Pain Clinic  -     MRI Cervical Spine Without Contrast; Future  -     cyclobenzaprine (FLEXERIL) 5 MG tablet; Take 1 tablet (5 mg total) by mouth 3 (three) times daily as needed.  -     diclofenac (VOLTAREN) 50 MG EC tablet; Take 1 tablet (50 mg total) by mouth 2 (two) times daily.    Cervical radiculopathy  -     Ambulatory referral to Pain Clinic  -     MRI Cervical Spine Without Contrast; Future    Dyslipidemia  -     Comprehensive metabolic panel; Future  -     Lipid panel; Future    Continue  monitoring blood pressure at home, low sodium diet.  Continue monitoring blood sugar at home,ADA diet.

## 2018-02-19 ENCOUNTER — LAB VISIT (OUTPATIENT)
Dept: LAB | Facility: HOSPITAL | Age: 73
End: 2018-02-19
Attending: FAMILY MEDICINE
Payer: MEDICARE

## 2018-02-19 DIAGNOSIS — E11.65 TYPE 2 DIABETES MELLITUS WITH HYPERGLYCEMIA, WITH LONG-TERM CURRENT USE OF INSULIN: ICD-10-CM

## 2018-02-19 DIAGNOSIS — I10 ESSENTIAL HYPERTENSION: ICD-10-CM

## 2018-02-19 DIAGNOSIS — E16.2 HYPOGLYCEMIA: ICD-10-CM

## 2018-02-19 DIAGNOSIS — E78.5 DYSLIPIDEMIA: ICD-10-CM

## 2018-02-19 DIAGNOSIS — Z79.4 TYPE 2 DIABETES MELLITUS WITH HYPERGLYCEMIA, WITH LONG-TERM CURRENT USE OF INSULIN: ICD-10-CM

## 2018-02-19 LAB
ALBUMIN SERPL BCP-MCNC: 3.3 G/DL
ALP SERPL-CCNC: 93 U/L
ALT SERPL W/O P-5'-P-CCNC: 13 U/L
ANION GAP SERPL CALC-SCNC: 10 MMOL/L
AST SERPL-CCNC: 19 U/L
BILIRUB SERPL-MCNC: 0.8 MG/DL
BUN SERPL-MCNC: 16 MG/DL
CALCIUM SERPL-MCNC: 8.7 MG/DL
CHLORIDE SERPL-SCNC: 103 MMOL/L
CHOLEST SERPL-MCNC: 152 MG/DL
CHOLEST/HDLC SERPL: 2.3 {RATIO}
CO2 SERPL-SCNC: 26 MMOL/L
CREAT SERPL-MCNC: 1.1 MG/DL
EST. GFR  (AFRICAN AMERICAN): >60 ML/MIN/1.73 M^2
EST. GFR  (NON AFRICAN AMERICAN): >60 ML/MIN/1.73 M^2
ESTIMATED AVG GLUCOSE: 223 MG/DL
GLUCOSE SERPL-MCNC: 182 MG/DL
HBA1C MFR BLD HPLC: 9.4 %
HDLC SERPL-MCNC: 66 MG/DL
HDLC SERPL: 43.4 %
LDLC SERPL CALC-MCNC: 66 MG/DL
NONHDLC SERPL-MCNC: 86 MG/DL
POTASSIUM SERPL-SCNC: 4.5 MMOL/L
PROT SERPL-MCNC: 6.6 G/DL
SODIUM SERPL-SCNC: 139 MMOL/L
TRIGL SERPL-MCNC: 100 MG/DL

## 2018-02-19 PROCEDURE — 83036 HEMOGLOBIN GLYCOSYLATED A1C: CPT

## 2018-02-19 PROCEDURE — 80061 LIPID PANEL: CPT

## 2018-02-19 PROCEDURE — 80053 COMPREHEN METABOLIC PANEL: CPT

## 2018-02-19 PROCEDURE — 36415 COLL VENOUS BLD VENIPUNCTURE: CPT | Mod: PO

## 2018-02-23 ENCOUNTER — HOSPITAL ENCOUNTER (OUTPATIENT)
Dept: RADIOLOGY | Facility: HOSPITAL | Age: 73
Discharge: HOME | End: 2018-02-23
Attending: FAMILY MEDICINE
Payer: MEDICARE

## 2018-02-23 DIAGNOSIS — M54.2 NECK PAIN OF OVER 3 MONTHS DURATION: ICD-10-CM

## 2018-02-23 DIAGNOSIS — M54.12 CERVICAL RADICULOPATHY: ICD-10-CM

## 2018-02-23 PROCEDURE — 72141 MRI NECK SPINE W/O DYE: CPT | Mod: TC

## 2018-02-23 PROCEDURE — 72141 MRI NECK SPINE W/O DYE: CPT | Mod: 26,,, | Performed by: RADIOLOGY

## 2018-02-26 ENCOUNTER — OFFICE VISIT (OUTPATIENT)
Dept: PAIN MEDICINE | Facility: CLINIC | Age: 73
End: 2018-02-26
Payer: MEDICARE

## 2018-02-26 ENCOUNTER — TELEPHONE (OUTPATIENT)
Dept: PAIN MEDICINE | Facility: CLINIC | Age: 73
End: 2018-02-26

## 2018-02-26 VITALS
SYSTOLIC BLOOD PRESSURE: 156 MMHG | WEIGHT: 214.06 LBS | DIASTOLIC BLOOD PRESSURE: 87 MMHG | HEART RATE: 72 BPM | HEIGHT: 75 IN | BODY MASS INDEX: 26.62 KG/M2 | RESPIRATION RATE: 18 BRPM

## 2018-02-26 DIAGNOSIS — M47.892 OTHER OSTEOARTHRITIS OF SPINE, CERVICAL REGION: ICD-10-CM

## 2018-02-26 DIAGNOSIS — M54.12 CERVICAL RADICULOPATHY: Primary | ICD-10-CM

## 2018-02-26 DIAGNOSIS — M47.812 ARTHROPATHY OF CERVICAL FACET JOINT: ICD-10-CM

## 2018-02-26 PROCEDURE — 1159F MED LIST DOCD IN RCRD: CPT | Mod: S$GLB,,, | Performed by: ANESTHESIOLOGY

## 2018-02-26 PROCEDURE — 99204 OFFICE O/P NEW MOD 45 MIN: CPT | Mod: S$GLB,,, | Performed by: ANESTHESIOLOGY

## 2018-02-26 PROCEDURE — 99999 PR PBB SHADOW E&M-EST. PATIENT-LVL V: CPT | Mod: PBBFAC,,, | Performed by: ANESTHESIOLOGY

## 2018-02-26 PROCEDURE — 3008F BODY MASS INDEX DOCD: CPT | Mod: S$GLB,,, | Performed by: ANESTHESIOLOGY

## 2018-02-26 PROCEDURE — 99499 UNLISTED E&M SERVICE: CPT | Mod: S$GLB,,, | Performed by: ANESTHESIOLOGY

## 2018-02-26 PROCEDURE — 1125F AMNT PAIN NOTED PAIN PRSNT: CPT | Mod: S$GLB,,, | Performed by: ANESTHESIOLOGY

## 2018-02-26 NOTE — PROGRESS NOTES
Chronic Pain - New Consult    Referring Physician: Basim Guerrero*    Chief Complaint:   Chief Complaint   Patient presents with    Neck Pain        SUBJECTIVE:    Kamar Muñoz presents to the clinic for the evaluation of neck pain. The pain started 2 months ago and symptoms have been worsening.The pain is located in the neck area and radiates to the left arm.  The pain is described as aching and tingling is rated as 6/10. The pain is rated with a score of  6/10 on the BEST day and a score of 9/10 on the WORST day.  Symptoms interfere with daily activity, sleeping and work. The pain is exacerbated by nothing in particular.  The pain is mitigated by medications. He reports spending 2 hours per day reclining. The patient reports 5 hours of uninterrupted sleep per night.    Patient denies night fever/night sweats, urinary incontinence, bowel incontinence, significant weight loss and significant motor weakness.    Physical Therapy/Home Exercise: no      Pain Disability Index Review:  Last 3 PDI Scores 2/26/2018   Pain Disability Index (PDI) 45       Pain Medications:    - Opioids: None  - Adjuvant Medications: Neurontin (Gabapentin)  - Anti-Coagulants: Aspirin  - Others: See Medication List      report:  Reviewed and consistent with medication use as prescribed.    Pain Procedures: L5 & S1 Fusion in 1980     Imaging: MRI Cervical Spine Without Contrast  Cervical spine MRI    Technique: MRI of cervical spine was performed without contrast and the following sequences were obtained: Localizer; sagittal T1, T2, and STIR; axial T2 and gradient.    Comparison: Not available.    Findings:    Vertebral body height and alignment are maintained. Facets are aligned.  Bone marrow signal is normal.     Visualized posterior fossa structures and cervical cord are unremarkable.    Limited evaluation of the neck soft tissues is unremarkable.  Mucoperiosteal thickening noted in the paranasal sinuses.    C2-3: Right  facet arthropathy noted.  No spinal canal stenosis or neuroforaminal narrowing.    C3-4: Uncovertebral and left facet arthropathy result in moderate bilateral neuroforaminal narrowing.  Posterior disc osteophyte complex effaces the ventral thecal sac.    C4-5: Uncovertebral and left facet arthropathy result in mild bilateral neuroforaminal narrowing.    C5-6: Posterior disc osteophyte complex effaces the ventral thecal sac.  Uncovertebral arthrosis results in moderate left, mild right neuroforaminal narrowing.    C6-7: Posterior disc osteophyte complex results in mild spinal canal stenosis.  Uncovertebral arthrosis results in severe left, mild right neuroforaminal narrowing.    C7-T1: No spinal canal stenosis or neuroforaminal narrowing.  Bilateral facet arthropathy and left perineural sleeve cyst noted.   Impression       1.  Multilevel degenerative changes of the cervical spine as detailed above.  2.  Sinus disease.      Electronically signed by: RAFAEL PADGETT MD  Date: 02/23/18  Time: 09:55            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 8/2/2012    Type II or unspecified type diabetes mellitus with neurological manifestations, uncontrolled(250.62) 3/8/2013    Urinary tract infection      Past Surgical History:   Procedure Laterality Date    BACK SURGERY      EYE SURGERY      HERNIA REPAIR      renal stones      SHOULDER OPEN ROTATOR CUFF REPAIR       Social History     Social History    Marital status:      Spouse name: N/A    Number of children: N/A    Years of education: N/A     Occupational History    Not on file.     Social History Main Topics    Smoking status: Former Smoker     Quit date: 1/1/1983    Smokeless tobacco: Never Used    Alcohol use No    Drug use: No    Sexual activity: Not Currently     Other Topics  "Concern    Not on file     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     Family History   Problem Relation Age of Onset    Prostate cancer Neg Hx     Kidney disease Neg Hx        Review of patient's allergies indicates:   Allergen Reactions    Iodine      Other reaction(s): swelling  Other reaction(s): Itching  Other reaction(s): Rash       Current Outpatient Prescriptions   Medication Sig    aspirin (ECOTRIN) 81 MG EC tablet Take 81 mg by mouth once daily.    budesonide (RINOCORT AQUA) 32 mcg/actuation nasal spray 1 spray (32 mcg total) by Nasal route once daily.    diltiaZEM (CARTIA XT) 120 MG Cp24 Take 1 capsule (120 mg total) by mouth once daily.    ergocalciferol (ERGOCALCIFEROL) 50,000 unit Cap     FLUZONE HIGH-DOSE 2017-18, PF, 180 mcg/0.5 mL vaccine ADMINISTER 0.5ML IN THE MUSCLE AS DIRECTED    gabapentin (NEURONTIN) 300 MG capsule TAKE 1 CAPSULE BY MOUTH TWICE DAILY    insulin aspart (NOVOLOG FLEXPEN) 100 unit/mL InPn pen Inject 24 Units into the skin 3 (three) times daily with meals.    insulin glargine (LANTUS) 100 unit/mL injection Inject 30 Units into the skin every evening.    insulin syringe-needle U-100 (INSULIN SYRINGE MICROFINE) 0.3 mL 28 gauge x 1/2" Syrg Use with Lantus    losartan (COZAAR) 25 MG tablet TAKE 1 TABLET BY MOUTH DAILY    metFORMIN (GLUCOPHAGE) 500 MG tablet Take 1 tablet (500 mg total) by mouth 2 (two) times daily with meals.    MULTIVITAMIN ORAL 1 tablet.    OMEPRAZOLE (PRILOSEC ORAL) Take 1 tablet by mouth daily as needed.     pen needle, diabetic (PEN NEEDLE) 30 gauge x 5/16" Ndle 1 Units by Misc.(Non-Drug; Combo Route) route 3 (three) times daily.    polycarbophil (FIBERCON) 625 mg tablet Take 1 tablet by mouth.    pravastatin (PRAVACHOL) 40 MG tablet TAKE 1 TABLET BY MOUTH EVERY DAY    budesonide (RINOCORT AQUA) 32 mcg/actuation nasal spray 1 spray (32 mcg total) by Nasal route once daily.    cyclobenzaprine " "(FLEXERIL) 5 MG tablet Take 1 tablet (5 mg total) by mouth 3 (three) times daily as needed.    diclofenac (VOLTAREN) 50 MG EC tablet Take 1 tablet (50 mg total) by mouth 2 (two) times daily.    doxycycline (VIBRA-TABS) 100 MG tablet TAKE 1 TABLET BY MOUTH TWICE DAILY WITH MEALS. DO NOT TAKE WITH DAIRY    hydrocodone-acetaminophen 5-325mg (NORCO) 5-325 mg per tablet Take 1 tablet by mouth every 6 (six) hours as needed for Pain.    ketorolac (TORADOL) 10 mg tablet Take 1 tablet (10 mg total) by mouth 2 (two) times daily as needed for Pain.    levocetirizine (XYZAL) 5 MG tablet Take 1 tablet (5 mg total) by mouth every evening.    penicillin v potassium (VEETID) 500 MG tablet TK 1 T PO QID TAT     No current facility-administered medications for this visit.        REVIEW OF SYSTEMS:    GENERAL:  No weight loss, malaise or fevers.+ DM   HEENT:  Negative for frequent or significant headaches.  NECK:  + neck pain  RESPIRATORY:  Negative for cough, wheezing or shortness of breath.  CARDIOVASCULAR:  Negative for chest pain, leg swelling or palpitations.  GI:  Negative for abdominal discomfort, blood in stools or black stools or change in bowel habits.  MUSCULOSKELETAL:  See HPI., Hx of L spine surgery   SKIN:  Negative for lesions, rash, and itching.  PSYCH:  Negative for sleep disturbance, mood disorder and recent psychosocial stressors.  HEMATOLOGY/LYMPHOLOGY:  Negative for prolonged bleeding, bruising easily or swollen nodes.  NEURO:   No history of headaches, syncope, paralysis, seizures or tremors.  All other reviewed and negative other than HPI.    OBJECTIVE:    BP (!) 156/87 (BP Location: Left arm, Patient Position: Sitting, BP Method: Large (Automatic))   Pulse 72   Resp 18   Ht 6' 3" (1.905 m)   Wt 97.1 kg (214 lb 1.1 oz)   BMI 26.76 kg/m²     PHYSICAL EXAMINATION:    General appearance: Well appearing, in no acute distress, alert and oriented x3.  Psych:  Mood and affect appropriate.  Skin: Skin color, " texture, turgor normal, no rashes or lesions, in both upper and lower body.  Head/face:  Normocephalic, atraumatic. No palpable lymph nodes.  Neck: +  pain to palpation over the cervical paraspinous muscles and left trapezisu and C spine. + pain with neck flexion, extension, and lateral flexion. With some limitation in ROM of the C spine  Cor: RRR  Pulm: CTA  Back: Straight leg raising in the sitting and supine positions is negative to radicular pain. No pain to palpation over the spine or costovertebral angles. Normal range of motion without pain reproduction.  Extremities: Peripheral joint ROM is full and pain free without obvious instability or laxity in all four extremities. No deformities, edema, or skin discoloration. Good capillary refill.  Musculoskeletal: Shoulder, hip, sacroiliac and knee provocative maneuvers are negative. Bilateral upper and lower extremity strength is normal and symmetric.  No atrophy or tone abnormalities are noted.  Neuro: Bilateral upper and lower extremity coordination and muscle stretch reflexes are physiologic and symmetric 1+.  Plantar response are downgoing. No loss of sensation is noted.  Gait: normal.    ASSESSMENT: 72 y.o. year old male with neck and left arm pain, consistent with        1. Cervical radiculopathy    2. Other osteoarthritis of spine, cervical region    3. Arthropathy of cervical facet joint          PLAN:     - I have stressed the importance of physical activity and a home exercise plan to help with pain and improve health.  - Referral to Physical therapy  -SF C7-T1 SEVERIANO  To help with radicular pain. I will consider cervical facet MBB in the future   - RTC 3 weeks after injection  - Counseled patient regarding the importance of activity modification, constant sleeping habits and physical therapy.    The above plan and management options were discussed at length with patient. Patient is in agreement with the above and verbalized understanding. It will be  communicated with the referring physician via electronic record, fax, or mail.    German Palma  02/26/2018

## 2018-02-26 NOTE — LETTER
February 26, 2018      Basim Guerrero MD  [address] [place] [address]           [place] [facility]  [address]  [place] [address]  Phone: [phone]          Patient: Kamar Muñoz   MR Number: [id number]   YOB: 1945   Date of Visit: 2/26/2018       Dear Dr. Basim Guerrero:    Thank you for referring Kamar Muñoz to me for evaluation. Attached you will find relevant portions of my assessment and plan of care.    If you have questions, please do not hesitate to call me. I look forward to following Kamar Muñoz along with you.    Sincerely,    German Palma MD    Enclosure  CC:  No Recipients    If you would like to receive this communication electronically, please contact [email or web address] or [phone] to request more information on [employer] Link access.    For providers and/or their staff who would like to refer a patient to [facility], please contact us through our one-stop-shop provider referral line, [facility], at [phone].    If you feel you have received this communication in error or would no longer like to receive these types of communications, please e-mail [email or web address]

## 2018-03-05 ENCOUNTER — TELEPHONE (OUTPATIENT)
Dept: PAIN MEDICINE | Facility: HOSPITAL | Age: 73
End: 2018-03-05

## 2018-03-05 NOTE — DISCHARGE INSTRUCTIONS
Home Care Instructions Pain Management:    1.  DIET:    You may resume your normal diet today.    2.  BATHING:    You may shower with luke warm water.    3.  DRESSING:    You may remove your bandage today.    4.  ACTIVITY LEVEL:      You may resume your normal activities 24 hours after your procedure.    5.  MEDICATIONS:    You may resume your normal medications today.    6.  SPECIAL INSTRUCTIONS:    No heat to the injection site for 24 hours including bath or shower, heating pad, moist heat or hot tubs.    Use an ice pack to the injection site for any pain or discomfort.  Apply ice packs for 20 minute intervals as needed.    If you have received any sedatives by mouth today, you can not drive for 12 hours.    If you have received sedation through an IV, you can not drive for 24 hours.    PLEASE CALL YOUR DOCTOR FOR THE FOLLOWIN.  Redness or swelling around the injection site.  2.  Fever of 101 degrees.  3.  Drainage (pus) from the injection site.  4.  For any continuous bleeding (some dried blood over the incision is normal.)    FOR EMERGENCIES:    If any unusual problems or difficulties occur during clinic hours, call [phone] or dial [phone].    Follow up with with your physician in 2-3 weeks.

## 2018-03-07 ENCOUNTER — HOSPITAL ENCOUNTER (OUTPATIENT)
Facility: HOSPITAL | Age: 73
Discharge: HOME | End: 2018-03-07
Attending: ANESTHESIOLOGY | Admitting: ANESTHESIOLOGY
Payer: MEDICARE

## 2018-03-07 ENCOUNTER — SURGERY (OUTPATIENT)
Age: 73
End: 2018-03-07

## 2018-03-07 VITALS
SYSTOLIC BLOOD PRESSURE: 163 MMHG | RESPIRATION RATE: 16 BRPM | WEIGHT: 213 LBS | DIASTOLIC BLOOD PRESSURE: 86 MMHG | OXYGEN SATURATION: 95 % | HEIGHT: 74 IN | HEART RATE: 70 BPM | BODY MASS INDEX: 27.34 KG/M2 | TEMPERATURE: 98 F

## 2018-03-07 LAB — POCT GLUCOSE: 131 MG/DL (ref 70–110)

## 2018-03-07 PROCEDURE — 63600175 PHARM REV CODE 636 W HCPCS: Performed by: ANESTHESIOLOGY

## 2018-03-07 PROCEDURE — 99152 MOD SED SAME PHYS/QHP 5/>YRS: CPT | Mod: ,,, | Performed by: ANESTHESIOLOGY

## 2018-03-07 PROCEDURE — 25500020 PHARM REV CODE 255: Performed by: ANESTHESIOLOGY

## 2018-03-07 PROCEDURE — A9579 GAD-BASE MR CONTRAST NOS,1ML: HCPCS | Performed by: ANESTHESIOLOGY

## 2018-03-07 PROCEDURE — 62321 NJX INTERLAMINAR CRV/THRC: CPT | Performed by: ANESTHESIOLOGY

## 2018-03-07 PROCEDURE — 62321 NJX INTERLAMINAR CRV/THRC: CPT | Mod: ,,, | Performed by: ANESTHESIOLOGY

## 2018-03-07 PROCEDURE — 25000003 PHARM REV CODE 250: Performed by: ANESTHESIOLOGY

## 2018-03-07 RX ORDER — FENTANYL CITRATE 50 UG/ML
INJECTION, SOLUTION INTRAMUSCULAR; INTRAVENOUS
Status: DISCONTINUED | OUTPATIENT
Start: 2018-03-07 | End: 2018-03-07 | Stop reason: HOSPADM

## 2018-03-07 RX ORDER — SODIUM CHLORIDE, SODIUM LACTATE, POTASSIUM CHLORIDE, CALCIUM CHLORIDE 600; 310; 30; 20 MG/100ML; MG/100ML; MG/100ML; MG/100ML
INJECTION, SOLUTION INTRAVENOUS CONTINUOUS
Status: DISCONTINUED | OUTPATIENT
Start: 2018-03-07 | End: 2018-03-07 | Stop reason: HOSPADM

## 2018-03-07 RX ORDER — LIDOCAINE HYDROCHLORIDE 5 MG/ML
INJECTION, SOLUTION INFILTRATION; PERINEURAL
Status: DISCONTINUED | OUTPATIENT
Start: 2018-03-07 | End: 2018-03-07 | Stop reason: HOSPADM

## 2018-03-07 RX ORDER — DEXAMETHASONE SODIUM PHOSPHATE 100 MG/10ML
INJECTION INTRAMUSCULAR; INTRAVENOUS
Status: DISCONTINUED | OUTPATIENT
Start: 2018-03-07 | End: 2018-03-07 | Stop reason: HOSPADM

## 2018-03-07 RX ORDER — LIDOCAINE HYDROCHLORIDE 10 MG/ML
INJECTION, SOLUTION EPIDURAL; INFILTRATION; INTRACAUDAL; PERINEURAL
Status: DISCONTINUED | OUTPATIENT
Start: 2018-03-07 | End: 2018-03-07 | Stop reason: HOSPADM

## 2018-03-07 RX ORDER — MIDAZOLAM HYDROCHLORIDE 1 MG/ML
INJECTION, SOLUTION INTRAMUSCULAR; INTRAVENOUS
Status: DISCONTINUED | OUTPATIENT
Start: 2018-03-07 | End: 2018-03-07 | Stop reason: HOSPADM

## 2018-03-07 RX ADMIN — MIDAZOLAM 2 MG: 1 INJECTION INTRAMUSCULAR; INTRAVENOUS at 08:03

## 2018-03-07 RX ADMIN — LIDOCAINE HYDROCHLORIDE 5 ML: 10 INJECTION, SOLUTION EPIDURAL; INFILTRATION; INTRACAUDAL; PERINEURAL at 08:03

## 2018-03-07 RX ADMIN — LIDOCAINE HYDROCHLORIDE 5 ML: 5 INJECTION, SOLUTION INFILTRATION; PERINEURAL at 08:03

## 2018-03-07 RX ADMIN — DEXAMETHASONE SODIUM PHOSPHATE 10 MG: 10 INJECTION INTRAMUSCULAR; INTRAVENOUS at 08:03

## 2018-03-07 RX ADMIN — FENTANYL CITRATE 50 MCG: 50 INJECTION, SOLUTION INTRAMUSCULAR; INTRAVENOUS at 08:03

## 2018-03-07 RX ADMIN — GADODIAMIDE 5 MG: 287 INJECTION INTRAVENOUS at 08:03

## 2018-03-07 RX ADMIN — SODIUM CHLORIDE, SODIUM LACTATE, POTASSIUM CHLORIDE, AND CALCIUM CHLORIDE: .6; .31; .03; .02 INJECTION, SOLUTION INTRAVENOUS at 07:03

## 2018-03-07 NOTE — OP NOTE
"Patient Name: Kamar Muñoz  MRN: [id number]    INFORMED CONSENT: The procedure, risks, benefits and options were discussed with patient. There are no contraindications to the procedure. The patient expressed understanding and agreed to proceed. The personnel performing the procedure was discussed. I verify that I personally obtained Kamar's consent prior to the start of the procedure and the signed consent can be found on the patient's chart.    PROCEDURE: C7-T1 CERVICAL EPIDURAL STEROID INJECTION    Procedure Date: 3/7/2018  Anesthesia:  systemic  Pre Procedure diagnosis:   Cervical radiculopathy   Post-Procedure diagnosis: Same    Sedation: Yes - Fentanyl 50 mcg and Midazolam 2 mg    DESCRIPTION OF PROCEDURE: The patient was brought to the procedure room. IV access was obtained prior to the procedure. The patient was positioned prone on the fluoroscopy table. Continuous hemodynamic monitoring was initiated including blood pressure, EKG, and pulse oximetry. The area of the cervical spine was prepped with chlorhexidine three times and draped in a sterile fashion. Skin anesthesia was achieved using 5 mL of Lidocaine 1% over the respective injection site. The C7-T1 interspace was visualized under fluoroscopic imaging. An 18 gauge 3 1/2" Tuohy needle was slowly inserted and advanced using loss of resistance to saline with AP, oblique and lateral fluoroscopic imaging for needle guidance. Negative aspiration for blood or CSF was confirmed. Epidural contrast spread was confirmed using 5mL of Omniscan contrast. Spread of the contrast in the cervical epidural space was noted . 5 mL of lidocaine 0.5% and 10 mg Decadron was injected. The needle was removed and bleeding was nil. A sterile dressing was applied. No specimens collected. The patient was taken back to the recovery room for further observation.     Blood Loss: Nill  Specimen: None    Pre Procedure Pain Level: 8/10  Post-Procedure Pain Level: 0/10        Discharge " Diagnosis: Same as Pre and Post Procedure  Condition on Discharge: Stable.  Diet on Discharge: Same as before.  Activity: as per instruction sheet.  Discharge to: Home with a responsible adult.  Follow up: as per Discharge instructions

## 2018-03-12 RX ORDER — LOSARTAN POTASSIUM 25 MG/1
TABLET ORAL
Qty: 90 TABLET | Refills: 0 | Status: SHIPPED | OUTPATIENT
Start: 2018-03-12 | End: 2018-06-10 | Stop reason: SDUPTHER

## 2018-03-28 NOTE — PROGRESS NOTES
Chronic patient Established Note (Follow up visit)      SUBJECTIVE:    Kamar Muñoz presents to the clinic for a follow-up appointment for neck pan AND s/p C7-T1 CERVICAL EPIDURAL STEROID INJECTION done 3/7/18 with 25% relief x 2 days only. Since the last visit, Kamar Muñoz states the pain has been persistant. Current pain intensity is 6/10.    Pain Disability Index Review:  Last 3 PDI Scores 4/2/2018 2/26/2018   Pain Disability Index (PDI) 42 45       Pain Medications:    - Opioids: None  - Adjuvant Medications: Neurontin (Gabapentin)  - Anti-Coagulants: Aspirin  - Others: See Medication List     Opioid Contract: no     report:  Reviewed and consistent with medication use as prescribed.    Pain Procedures:  L5 & S1 Fusion in 1980   C7-T1 CERVICAL EPIDURAL STEROID INJECTION  3/7/18    Physical Therapy/Home Exercise: yes    Imaging: MRI Cervical Spine Without Contrast  Cervical spine MRI    Technique: MRI of cervical spine was performed without contrast and the following sequences were obtained: Localizer; sagittal T1, T2, and STIR; axial T2 and gradient.    Comparison: Not available.    Findings:    Vertebral body height and alignment are maintained. Facets are aligned.  Bone marrow signal is normal.     Visualized posterior fossa structures and cervical cord are unremarkable.    Limited evaluation of the neck soft tissues is unremarkable.  Mucoperiosteal thickening noted in the paranasal sinuses.    C2-3: Right facet arthropathy noted.  No spinal canal stenosis or neuroforaminal narrowing.    C3-4: Uncovertebral and left facet arthropathy result in moderate bilateral neuroforaminal narrowing.  Posterior disc osteophyte complex effaces the ventral thecal sac.    C4-5: Uncovertebral and left facet arthropathy result in mild bilateral neuroforaminal narrowing.    C5-6: Posterior disc osteophyte complex effaces the ventral thecal sac.  Uncovertebral arthrosis results in moderate left, mild right  neuroforaminal narrowing.    C6-7: Posterior disc osteophyte complex results in mild spinal canal stenosis.  Uncovertebral arthrosis results in severe left, mild right neuroforaminal narrowing.    C7-T1: No spinal canal stenosis or neuroforaminal narrowing.  Bilateral facet arthropathy and left perineural sleeve cyst noted.   Impression        1.  Multilevel degenerative changes of the cervical spine as detailed above.  2.  Sinus disease.      Electronically signed by: RAFAEL PADGETT MD  Date: 02/23/18  Time: 09:55        Allergies:   Review of patient's allergies indicates:   Allergen Reactions    Iodine      Other reaction(s): swelling  Other reaction(s): Itching  Other reaction(s): Rash       Current Medications:   Current Outpatient Prescriptions   Medication Sig Dispense Refill    aspirin (ECOTRIN) 81 MG EC tablet Take 81 mg by mouth once daily.      budesonide (RINOCORT AQUA) 32 mcg/actuation nasal spray 1 spray (32 mcg total) by Nasal route once daily. 8.6 g 11    budesonide (RINOCORT AQUA) 32 mcg/actuation nasal spray 1 spray (32 mcg total) by Nasal route once daily. 8.6 g 11    celecoxib (CELEBREX) 200 MG capsule   0    diltiaZEM (CARTIA XT) 120 MG Cp24 Take 1 capsule (120 mg total) by mouth once daily. 90 capsule 0    ergocalciferol (ERGOCALCIFEROL) 50,000 unit Cap       FLUZONE HIGH-DOSE 2017-18, PF, 180 mcg/0.5 mL vaccine ADMINISTER 0.5ML IN THE MUSCLE AS DIRECTED  0    gabapentin (NEURONTIN) 300 MG capsule TAKE 1 CAPSULE BY MOUTH TWICE DAILY 180 capsule 0    hydrocodone-acetaminophen 5-325mg (NORCO) 5-325 mg per tablet Take 1 tablet by mouth every 6 (six) hours as needed for Pain. 10 tablet 0    insulin aspart (NOVOLOG FLEXPEN) 100 unit/mL InPn pen Inject 24 Units into the skin 3 (three) times daily with meals. 21.6 mL 11    insulin glargine (LANTUS) 100 unit/mL injection Inject 30 Units into the skin every evening. 9 mL 11    insulin syringe-needle U-100 (INSULIN SYRINGE MICROFINE) 0.3 mL  "28 gauge x 1/2" Syrg Use with Lantus 100 each 6    ketorolac (TORADOL) 10 mg tablet Take 1 tablet (10 mg total) by mouth 2 (two) times daily as needed for Pain. 20 tablet 0    levocetirizine (XYZAL) 5 MG tablet Take 1 tablet (5 mg total) by mouth every evening. 30 tablet 0    losartan (COZAAR) 25 MG tablet TAKE 1 TABLET BY MOUTH DAILY 90 tablet 0    metFORMIN (GLUCOPHAGE) 500 MG tablet Take 1 tablet (500 mg total) by mouth 2 (two) times daily with meals. 180 tablet 1    MULTIVITAMIN ORAL 1 tablet.      OMEPRAZOLE (PRILOSEC ORAL) Take 1 tablet by mouth daily as needed.       pen needle, diabetic (PEN NEEDLE) 30 gauge x 5/16" Ndle 1 Units by Misc.(Non-Drug; Combo Route) route 3 (three) times daily. 100 each 3    penicillin v potassium (VEETID) 500 MG tablet TK 1 T PO QID TAT  0    polycarbophil (FIBERCON) 625 mg tablet Take 1 tablet by mouth.      pravastatin (PRAVACHOL) 40 MG tablet TAKE 1 TABLET BY MOUTH EVERY DAY 90 tablet 0     No current facility-administered medications for this visit.        REVIEW OF SYSTEMS:  GENERAL:  No weight loss, malaise or fevers.+ DM   HEENT:  Negative for frequent or significant headaches.  NECK:  + neck pain  RESPIRATORY:  Negative for cough, wheezing or shortness of breath.  CARDIOVASCULAR:  Negative for chest pain, leg swelling or palpitations.  GI:  Negative for abdominal discomfort, blood in stools or black stools or change in bowel habits.  MUSCULOSKELETAL:  See HPI., Hx of L spine surgery   SKIN:  Negative for lesions, rash, and itching.  PSYCH:  Negative for sleep disturbance, mood disorder and recent psychosocial stressors.  HEMATOLOGY/LYMPHOLOGY:  Negative for prolonged bleeding, bruising easily or swollen nodes.  NEURO:   No history of headaches, syncope, paralysis, seizures or tremors.  All other reviewed and negative other than HPI.    Past Medical History:  Past Medical History:   Diagnosis Date    Arthritis     Coronary artery disease     Diabetes mellitus " type II     Hyperlipidemia     Hypertension     Kidney stone     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 8/2/2012    Type II or unspecified type diabetes mellitus with neurological manifestations, uncontrolled(250.62) 3/8/2013    Urinary tract infection        Past Surgical History:  Past Surgical History:   Procedure Laterality Date    BACK SURGERY      EYE SURGERY      HERNIA REPAIR      renal stones      SHOULDER OPEN ROTATOR CUFF REPAIR         Family History:  Family History   Problem Relation Age of Onset    Prostate cancer Neg Hx     Kidney disease Neg Hx        Social History:  Social History     Social History    Marital status:      Spouse name: N/A    Number of children: N/A    Years of education: N/A     Social History Main Topics    Smoking status: Former Smoker     Quit date: 1/1/1983    Smokeless tobacco: Never Used    Alcohol use No    Drug use: No    Sexual activity: Not Currently     Other Topics Concern    None     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       OBJECTIVE:    /68   Pulse 68   Wt 96.6 kg (213 lb)   BMI 27.35 kg/m²     PHYSICAL EXAMINATION:    General appearance: Well appearing, in no acute distress, alert and oriented x3.  Psych:  Mood and affect appropriate.  Skin: Skin color, texture, turgor normal, no rashes or lesions, in both upper and lower body.  Head/face:  Normocephalic, atraumatic. No palpable lymph nodes.  Neck: +  pain to palpation over the left cervical paraspinous muscles and left trapezius and left side of  C spine. + pain with neck extension, and lateral flexion. With  limitation in ROM of the C spine on lateral flexion to the left   Back: Straight leg raising in the sitting and supine positions is negative to radicular pain. No pain to palpation over the spine or costovertebral angles. Normal range of motion without pain reproduction.  Extremities: Peripheral joint ROM is full and  pain free without obvious instability or laxity in all four extremities. No deformities, edema, or skin discoloration. Good capillary refill.  Musculoskeletal: Shoulder, hip, sacroiliac and knee provocative maneuvers are negative. Bilateral upper and lower extremity strength is normal and symmetric.  No atrophy or tone abnormalities are noted.  Neuro: Bilateral upper and lower extremity coordination and muscle stretch reflexes are physiologic and symmetric 1+.  Plantar response are downgoing. No loss of sensation is noted.  Gait: normal.    ASSESSMENT: 72 y.o. year old male with left sided neck  pain, consistent with     1. Other osteoarthritis of spine, cervical region    2. Cervical facet joint syndrome    3. Cervical radiculopathy        He had  limited relief after C7-T1 SEVERIANO  He has not started PT    PLAN:     - I have stressed the importance of physical activity and a home exercise plan to help with pain and improve health.  -Pt to start PT to help with ROM  -Will SF left C3,4,5,6 facet MBb  . I think his main pain component  is arthritic due to cervical facet arthropathy   - RTC 3 weeks after MBB  - Counseled patient regarding the importance of constant sleeping habits and physical therapy.    The above plan and management options were discussed at length with patient. Patient is in agreement with the above and verbalized understanding.    German Palma  04/02/2018

## 2018-04-02 ENCOUNTER — OFFICE VISIT (OUTPATIENT)
Dept: PAIN MEDICINE | Facility: CLINIC | Age: 73
End: 2018-04-02
Payer: MEDICARE

## 2018-04-02 VITALS
WEIGHT: 213 LBS | BODY MASS INDEX: 27.35 KG/M2 | HEART RATE: 68 BPM | SYSTOLIC BLOOD PRESSURE: 126 MMHG | DIASTOLIC BLOOD PRESSURE: 68 MMHG

## 2018-04-02 DIAGNOSIS — M47.812 CERVICAL FACET JOINT SYNDROME: ICD-10-CM

## 2018-04-02 DIAGNOSIS — M54.12 CERVICAL RADICULOPATHY: ICD-10-CM

## 2018-04-02 DIAGNOSIS — M47.892 OTHER OSTEOARTHRITIS OF SPINE, CERVICAL REGION: Primary | ICD-10-CM

## 2018-04-02 PROCEDURE — 3078F DIAST BP <80 MM HG: CPT | Mod: CPTII,S$GLB,, | Performed by: ANESTHESIOLOGY

## 2018-04-02 PROCEDURE — 99213 OFFICE O/P EST LOW 20 MIN: CPT | Mod: S$GLB,,, | Performed by: ANESTHESIOLOGY

## 2018-04-02 PROCEDURE — 3074F SYST BP LT 130 MM HG: CPT | Mod: CPTII,S$GLB,, | Performed by: ANESTHESIOLOGY

## 2018-04-02 PROCEDURE — 99499 UNLISTED E&M SERVICE: CPT | Mod: S$PBB,,, | Performed by: ANESTHESIOLOGY

## 2018-04-02 PROCEDURE — 99999 PR PBB SHADOW E&M-EST. PATIENT-LVL IV: CPT | Mod: PBBFAC,,, | Performed by: ANESTHESIOLOGY

## 2018-04-02 RX ORDER — CELECOXIB 200 MG/1
CAPSULE ORAL
Refills: 0 | COMMUNITY
Start: 2018-03-11 | End: 2018-04-12 | Stop reason: SDUPTHER

## 2018-04-04 ENCOUNTER — TELEPHONE (OUTPATIENT)
Dept: PAIN MEDICINE | Facility: CLINIC | Age: 73
End: 2018-04-04

## 2018-04-04 NOTE — TELEPHONE ENCOUNTER
"Called pt back to respond to questions he has concerning his upcoming cervical medial branch block.  The phone # [phone] was the spouse who stated that the patient is angry about having to pay another co-pay for this procedure.   She gave an alternate phone # [phone].  Spoke with the patient.  He states that on Monday Marquisea explained to him that if Dr. Palma scheduled it as a follow up procedure then he would not be responsible to another copay.  I explained to the patient that is is not technically a follow u, but that most insurances requires 2 MBB's prior to having the RFA (burn) done.   Pt states "nevermind" and I was told to cancel the 4/11/18 procedure with Dr. Palma.  Pt stated that he was not going to pay over $500 for this.  Pt then hung up.      ----- Message from Dorcas Burnett sent at 4/4/2018  4:03 PM CDT -----  Contact: Self [phone]  Patient is calling to talk to nurse concerning questions on his procedure. Please advice     "

## 2018-04-09 RX ORDER — GABAPENTIN 300 MG/1
CAPSULE ORAL
Qty: 180 CAPSULE | Refills: 0 | Status: SHIPPED | OUTPATIENT
Start: 2018-04-09 | End: 2018-07-10 | Stop reason: SDUPTHER

## 2018-04-13 ENCOUNTER — TELEPHONE (OUTPATIENT)
Dept: INTERNAL MEDICINE | Facility: CLINIC | Age: 73
End: 2018-04-13

## 2018-04-13 RX ORDER — CELECOXIB 200 MG/1
200 CAPSULE ORAL DAILY
Qty: 30 CAPSULE | Refills: 0 | Status: SHIPPED | OUTPATIENT
Start: 2018-04-13 | End: 2018-04-13 | Stop reason: SDUPTHER

## 2018-04-13 RX ORDER — DILTIAZEM HYDROCHLORIDE 120 MG/1
120 CAPSULE, COATED, EXTENDED RELEASE ORAL DAILY
Qty: 30 CAPSULE | Refills: 0 | Status: SHIPPED | OUTPATIENT
Start: 2018-04-13 | End: 2018-04-13 | Stop reason: SDUPTHER

## 2018-04-13 NOTE — TELEPHONE ENCOUNTER
Notify pt. I received a refill request for celebrex and med list shows he is on toradol; please clarify what he is taking so I can refill

## 2018-04-17 RX ORDER — DILTIAZEM HYDROCHLORIDE 120 MG/1
CAPSULE, EXTENDED RELEASE ORAL
Qty: 90 CAPSULE | Refills: 0 | Status: SHIPPED | OUTPATIENT
Start: 2018-04-17 | End: 2018-05-14

## 2018-04-17 RX ORDER — PRAVASTATIN SODIUM 40 MG/1
TABLET ORAL
Qty: 90 TABLET | Refills: 0 | Status: SHIPPED | OUTPATIENT
Start: 2018-04-17 | End: 2018-07-15 | Stop reason: SDUPTHER

## 2018-04-17 RX ORDER — CELECOXIB 200 MG/1
200 CAPSULE ORAL DAILY PRN
Qty: 90 CAPSULE | Refills: 0 | Status: SHIPPED | OUTPATIENT
Start: 2018-04-17 | End: 2018-05-14

## 2018-04-20 DIAGNOSIS — N18.30 TYPE 2 DIABETES MELLITUS WITH STAGE 3 CHRONIC KIDNEY DISEASE, WITH LONG-TERM CURRENT USE OF INSULIN: ICD-10-CM

## 2018-04-20 DIAGNOSIS — E11.65 TYPE 2 DIABETES MELLITUS WITH HYPERGLYCEMIA, WITH LONG-TERM CURRENT USE OF INSULIN: ICD-10-CM

## 2018-04-20 DIAGNOSIS — Z79.4 TYPE 2 DIABETES MELLITUS WITH HYPERGLYCEMIA, WITH LONG-TERM CURRENT USE OF INSULIN: ICD-10-CM

## 2018-04-20 DIAGNOSIS — Z79.4 TYPE 2 DIABETES MELLITUS WITH STAGE 3 CHRONIC KIDNEY DISEASE, WITH LONG-TERM CURRENT USE OF INSULIN: ICD-10-CM

## 2018-04-20 DIAGNOSIS — E11.22 TYPE 2 DIABETES MELLITUS WITH STAGE 3 CHRONIC KIDNEY DISEASE, WITH LONG-TERM CURRENT USE OF INSULIN: ICD-10-CM

## 2018-04-20 RX ORDER — METFORMIN HYDROCHLORIDE 500 MG/1
500 TABLET ORAL 2 TIMES DAILY WITH MEALS
Qty: 180 TABLET | Refills: 1 | Status: SHIPPED | OUTPATIENT
Start: 2018-04-20 | End: 2018-10-08 | Stop reason: SDUPTHER

## 2018-05-14 RX ORDER — CELECOXIB 200 MG/1
CAPSULE ORAL
Qty: 30 CAPSULE | Refills: 0 | Status: SHIPPED | OUTPATIENT
Start: 2018-05-14 | End: 2018-09-11 | Stop reason: SDUPTHER

## 2018-05-14 RX ORDER — DILTIAZEM HYDROCHLORIDE 120 MG/1
CAPSULE, EXTENDED RELEASE ORAL
Qty: 30 CAPSULE | Refills: 0 | Status: SHIPPED | OUTPATIENT
Start: 2018-05-14 | End: 2018-09-11 | Stop reason: SDUPTHER

## 2018-06-11 ENCOUNTER — HOSPITAL ENCOUNTER (OUTPATIENT)
Dept: RADIOLOGY | Facility: HOSPITAL | Age: 73
Discharge: HOME | End: 2018-06-11
Attending: ORTHOPAEDIC SURGERY
Payer: MEDICARE

## 2018-06-11 ENCOUNTER — OFFICE VISIT (OUTPATIENT)
Dept: SPORTS MEDICINE | Facility: CLINIC | Age: 73
End: 2018-06-11
Payer: MEDICARE

## 2018-06-11 VITALS
BODY MASS INDEX: 27.34 KG/M2 | HEART RATE: 97 BPM | HEIGHT: 74 IN | DIASTOLIC BLOOD PRESSURE: 73 MMHG | WEIGHT: 213 LBS | SYSTOLIC BLOOD PRESSURE: 130 MMHG

## 2018-06-11 DIAGNOSIS — M25.512 LEFT SHOULDER PAIN, UNSPECIFIED CHRONICITY: ICD-10-CM

## 2018-06-11 DIAGNOSIS — M25.512 LEFT SHOULDER PAIN, UNSPECIFIED CHRONICITY: Primary | ICD-10-CM

## 2018-06-11 PROCEDURE — 99203 OFFICE O/P NEW LOW 30 MIN: CPT | Mod: S$GLB,,, | Performed by: ORTHOPAEDIC SURGERY

## 2018-06-11 PROCEDURE — 73030 X-RAY EXAM OF SHOULDER: CPT | Mod: TC,FY,PO,LT

## 2018-06-11 PROCEDURE — 99999 PR PBB SHADOW E&M-EST. PATIENT-LVL III: CPT | Mod: PBBFAC,,, | Performed by: ORTHOPAEDIC SURGERY

## 2018-06-11 PROCEDURE — 3078F DIAST BP <80 MM HG: CPT | Mod: CPTII,S$GLB,, | Performed by: ORTHOPAEDIC SURGERY

## 2018-06-11 PROCEDURE — 3075F SYST BP GE 130 - 139MM HG: CPT | Mod: CPTII,S$GLB,, | Performed by: ORTHOPAEDIC SURGERY

## 2018-06-11 PROCEDURE — 73030 X-RAY EXAM OF SHOULDER: CPT | Mod: 26,LT,, | Performed by: RADIOLOGY

## 2018-06-11 RX ORDER — LOSARTAN POTASSIUM 25 MG/1
TABLET ORAL
Qty: 90 TABLET | Refills: 0 | Status: SHIPPED | OUTPATIENT
Start: 2018-06-11 | End: 2018-09-08 | Stop reason: SDUPTHER

## 2018-06-11 NOTE — PROGRESS NOTES
CC: left Shoulder pain    72 y.o. Male reports that the pain is severe and not responding to any conservative care.  Pain has been present for 2 weeks with no inciting injury or trauma, but he does frequently lift his wife's 160lb scooter.  Pain is located mainly over anterior shoulder.    He reports that the pain is worse with overhead activity. It also bothers him at night.    He is s/p Left shoulder RCR, SAD, and biceps auto-tenodesis in 7/2010    PAST MEDICAL HISTORY:   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 8/2/2012    Type II or unspecified type diabetes mellitus with neurological manifestations, uncontrolled(250.62) 3/8/2013    Urinary tract infection      PAST SURGICAL HISTORY:   Past Surgical History:   Procedure Laterality Date    BACK SURGERY      EYE SURGERY      HERNIA REPAIR      renal stones      SHOULDER OPEN ROTATOR CUFF REPAIR       FAMILY HISTORY:   Family History   Problem Relation Age of Onset    Prostate cancer Neg Hx     Kidney disease Neg Hx      SOCIAL HISTORY:   Social History     Social History    Marital status:      Spouse name: N/A    Number of children: N/A    Years of education: N/A     Occupational History    Not on file.     Social History Main Topics    Smoking status: Former Smoker     Quit date: 1/1/1983    Smokeless tobacco: Never Used    Alcohol use No    Drug use: No    Sexual activity: Not Currently     Other Topics Concern    Not on file     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       MEDICATIONS:   Current Outpatient Prescriptions:     aspirin (ECOTRIN) 81 MG EC tablet, Take 81 mg by mouth once daily., Disp: , Rfl:     budesonide (RINOCORT AQUA) 32 mcg/actuation nasal spray, 1 spray (32 mcg total) by Nasal route once daily.,  "Disp: 8.6 g, Rfl: 11    budesonide (RINOCORT AQUA) 32 mcg/actuation nasal spray, 1 spray (32 mcg total) by Nasal route once daily., Disp: 8.6 g, Rfl: 11    CARTIA  mg Cp24, TAKE 1 CAPSULE(120 MG) BY MOUTH EVERY DAY, Disp: 30 capsule, Rfl: 0    celecoxib (CELEBREX) 200 MG capsule, TAKE 1 CAPSULE(200 MG) BY MOUTH EVERY DAY, Disp: 30 capsule, Rfl: 0    ergocalciferol (ERGOCALCIFEROL) 50,000 unit Cap, , Disp: , Rfl:     gabapentin (NEURONTIN) 300 MG capsule, TAKE 1 CAPSULE BY MOUTH TWICE DAILY, Disp: 180 capsule, Rfl: 0    hydrocodone-acetaminophen 5-325mg (NORCO) 5-325 mg per tablet, Take 1 tablet by mouth every 6 (six) hours as needed for Pain., Disp: 10 tablet, Rfl: 0    insulin aspart (NOVOLOG FLEXPEN) 100 unit/mL InPn pen, Inject 24 Units into the skin 3 (three) times daily with meals., Disp: 21.6 mL, Rfl: 11    insulin glargine (LANTUS) 100 unit/mL injection, Inject 30 Units into the skin every evening., Disp: 9 mL, Rfl: 11    insulin syringe-needle U-100 (INSULIN SYRINGE MICROFINE) 0.3 mL 28 gauge x 1/2" Syrg, Use with Lantus, Disp: 100 each, Rfl: 6    losartan (COZAAR) 25 MG tablet, TAKE 1 TABLET BY MOUTH DAILY, Disp: 90 tablet, Rfl: 0    metFORMIN (GLUCOPHAGE) 500 MG tablet, Take 1 tablet (500 mg total) by mouth 2 (two) times daily with meals., Disp: 180 tablet, Rfl: 1    MULTIVITAMIN ORAL, 1 tablet., Disp: , Rfl:     OMEPRAZOLE (PRILOSEC ORAL), Take 1 tablet by mouth daily as needed. , Disp: , Rfl:     pen needle, diabetic (PEN NEEDLE) 30 gauge x 5/16" Ndle, 1 Units by Misc.(Non-Drug; Combo Route) route 3 (three) times daily., Disp: 100 each, Rfl: 3    penicillin v potassium (VEETID) 500 MG tablet, TK 1 T PO QID TAT, Disp: , Rfl: 0    polycarbophil (FIBERCON) 625 mg tablet, Take 1 tablet by mouth., Disp: , Rfl:     pravastatin (PRAVACHOL) 40 MG tablet, TAKE 1 TABLET BY MOUTH EVERY DAY, Disp: 90 tablet, Rfl: 0    levocetirizine (XYZAL) 5 MG tablet, Take 1 tablet (5 mg total) by mouth " "every evening., Disp: 30 tablet, Rfl: 0  ALLERGIES:   Review of patient's allergies indicates:   Allergen Reactions    Iodine      Other reaction(s): swelling  Other reaction(s): Itching  Other reaction(s): Rash       VITAL SIGNS: /73   Pulse 97   Ht 6' 2" (1.88 m)   Wt 96.6 kg (213 lb)   BMI 27.35 kg/m²      Review of Systems   Constitution: Negative. Negative for chills, fever and night sweats.   HENT: Negative for congestion and headaches.    Eyes: Negative for blurred vision, left vision loss and right vision loss.   Cardiovascular: Negative for chest pain and syncope.   Respiratory: Negative for cough and shortness of breath.    Endocrine: Negative for polydipsia, polyphagia and polyuria.   Hematologic/Lymphatic: Negative for bleeding problem. Does not bruise/bleed easily.   Skin: Negative for dry skin, itching and rash.   Musculoskeletal: Negative for falls and muscle weakness.   Gastrointestinal: Negative for abdominal pain and bowel incontinence.   Genitourinary: Negative for bladder incontinence and nocturia.   Neurological: Negative for disturbances in coordination, loss of balance and seizures.   Psychiatric/Behavioral: Negative for depression. The patient does not have insomnia.    Allergic/Immunologic: Negative for hives and persistent infections.       PHYSICAL EXAMINATION:  General:  The patient is alert and oriented x 3.  Mood is pleasant.  Observation of ears, eyes and nose reveal no gross abnormalities.  HEENT: NCAT, sclera nonicteric  Lungs: Respirations are equal and unlabored.  Gait is coordinated. Patient can toe walk and heel walk without difficulty.  Cardiovascular: There are no swelling or varicosities present.   Lymphatic: Negative for adenopathy       left Shoulder / Upper Extremity Exam    OBSERVATION:     Swelling  none  Deformity  none   Discoloration  none   Scapular winging none   Scars   none  Atrophy  none    TENDERNESS / CREPITUS (T/C):          T/C      T/C   Clavicle "   -/-  SUPRAspinatus    -/-   AC Jt.    -/-  INFRAspinatus  -/-   SC Jt.    -/-  Deltoid    -/-   G. Tuberosity  -/-  LH BICEP groove  +/-   Acromion:  -/-  Midline Neck   -/-   Scapular Spine -/-  Trapezium   -/-   SMA Scapula  -/-  GH jt. line - post  -/-   Scapulothoracic  -/-         ROM: (* = with pain)  Right shoulder   Left shoulder        AROM (PROM)   AROM (PROM)   FE    170° (175°)     110° (175°)     ER at 0°    60°  (65°)    50°  (65°)   ER at 90° ABD  90°  (90°)    70°  (90°)   IR at 90°  ABD   NA  (40°)     NA  (40°)      IR (spine level)   T10     L3    STRENGTH: (* = with pain) RIGHT SHOULDER  LEFT SHOULDER   SCAPTION at 0   5/5    5/5    SCAPTION at 30   5/5    5/5    IR    5/5    5/5   ER    5/5    5/5   BICEPS   5/5    5/5   Deltoid    5/5    5/5     SIGNS:  Painful side       NEER   +   ODAVIDS  +   HERNANDEZ   neg    SPEEDS  Neg   DROP ARM   neg   BELLY PRESS Neg   Superior escape none    LIFT-OFF  Neg   X-Body ADD    neg    MOVING VALGUS Neg      STABILITY TESTING    RIGHT SHOULDER   LEFT SHOULDER       Translation    Anterior  up face     up face    Posterior  up face    up face    Sulcus   < 10mm    < 10 mm    Signs    Apprehension   neg      Neg    Relocation   no change     no change    Jerk test  neg     Neg      EXTREMITY NEURO-VASCULAR EXAM    Sensation grossly intact to light touch all dermatomal regions.    DTR 2+ Biceps, Triceps, BR and Negative Ingriss sign   Grossly intact motor function at Elbow, Wrist and Hand   Distal pulses radial and ulnar 2+, brisk cap refill, symmetric.      NECK:  Painless FROM and spinous processes non-tender. Negative Spurlings sign.      OTHER FINDINGS:    XRAYS:  Shoulder trauma series left,  were ordered and reviewed by me. No convincing fracture or dislocation is noted. The osseous structures appear well mineralized and well aligned    1. Shoulder pain, left     Plan:       ASSESSMENT:  shoulder pain.    I do think that this is likely a  rotator cuff re-tear.    I have recommended we check an MRI of the shoulder to evaluate this.    Depending on the results of the MRI, we may consider a cortisone   injection and physical therapy and/or arthroscopic intervention and treatment   depending on what we find.  I will see him back upon its completion or PHREV and we will consider the above.

## 2018-06-15 ENCOUNTER — TELEPHONE (OUTPATIENT)
Dept: SPORTS MEDICINE | Facility: CLINIC | Age: 73
End: 2018-06-15

## 2018-06-15 ENCOUNTER — HOSPITAL ENCOUNTER (OUTPATIENT)
Dept: RADIOLOGY | Facility: HOSPITAL | Age: 73
Discharge: HOME | End: 2018-06-15
Attending: STUDENT IN AN ORGANIZED HEALTH CARE EDUCATION/TRAINING PROGRAM
Payer: MEDICARE

## 2018-06-15 DIAGNOSIS — M25.512 LEFT SHOULDER PAIN, UNSPECIFIED CHRONICITY: ICD-10-CM

## 2018-06-15 PROCEDURE — 73221 MRI JOINT UPR EXTREM W/O DYE: CPT | Mod: TC,LT

## 2018-06-15 PROCEDURE — 73221 MRI JOINT UPR EXTREM W/O DYE: CPT | Mod: 26,LT,, | Performed by: RADIOLOGY

## 2018-06-15 NOTE — TELEPHONE ENCOUNTER
KARINEM making pt aware that Dr. Lynn would review his MRI results on Monday and someone from his staff will call him back with results and POC.    ----- Message from Shahida Ramírez sent at 6/15/2018 11:50 AM CDT -----  Contact: Self   Pt is requesting a call back in regards to his MRI results       Pt can be contacted at [phone]

## 2018-06-20 ENCOUNTER — TELEPHONE (OUTPATIENT)
Dept: SPORTS MEDICINE | Facility: CLINIC | Age: 73
End: 2018-06-20

## 2018-06-20 NOTE — TELEPHONE ENCOUNTER
I spoke with the patient and scheduled his injection appointment with [facility] and he notes that he does not want to do physical therapy and would rather do a home exercise program

## 2018-06-22 ENCOUNTER — OFFICE VISIT (OUTPATIENT)
Dept: SPORTS MEDICINE | Facility: CLINIC | Age: 73
End: 2018-06-22
Payer: MEDICARE

## 2018-06-22 VITALS
HEART RATE: 93 BPM | SYSTOLIC BLOOD PRESSURE: 115 MMHG | BODY MASS INDEX: 27.34 KG/M2 | WEIGHT: 213 LBS | DIASTOLIC BLOOD PRESSURE: 67 MMHG | HEIGHT: 74 IN

## 2018-06-22 DIAGNOSIS — M25.512 ACUTE PAIN OF LEFT SHOULDER: Primary | ICD-10-CM

## 2018-06-22 DIAGNOSIS — M25.812 SHOULDER IMPINGEMENT, LEFT: ICD-10-CM

## 2018-06-22 PROCEDURE — 3074F SYST BP LT 130 MM HG: CPT | Mod: CPTII,S$GLB,, | Performed by: PHYSICIAN ASSISTANT

## 2018-06-22 PROCEDURE — 3078F DIAST BP <80 MM HG: CPT | Mod: CPTII,S$GLB,, | Performed by: PHYSICIAN ASSISTANT

## 2018-06-22 PROCEDURE — 99999 PR PBB SHADOW E&M-EST. PATIENT-LVL IV: CPT | Mod: PBBFAC,,, | Performed by: PHYSICIAN ASSISTANT

## 2018-06-22 PROCEDURE — 20610 DRAIN/INJ JOINT/BURSA W/O US: CPT | Mod: LT,S$GLB,, | Performed by: PHYSICIAN ASSISTANT

## 2018-06-22 PROCEDURE — 99214 OFFICE O/P EST MOD 30 MIN: CPT | Mod: 25,S$GLB,, | Performed by: PHYSICIAN ASSISTANT

## 2018-06-22 RX ORDER — LIDOCAINE HYDROCHLORIDE 10 MG/ML
4 INJECTION INFILTRATION; PERINEURAL
Status: COMPLETED | OUTPATIENT
Start: 2018-06-22 | End: 2018-06-22

## 2018-06-22 RX ORDER — BUPIVACAINE HYDROCHLORIDE 2.5 MG/ML
4 INJECTION, SOLUTION INFILTRATION; PERINEURAL
Status: COMPLETED | OUTPATIENT
Start: 2018-06-22 | End: 2018-06-22

## 2018-06-22 RX ORDER — TRIAMCINOLONE ACETONIDE 40 MG/ML
40 INJECTION, SUSPENSION INTRA-ARTICULAR; INTRAMUSCULAR
Status: COMPLETED | OUTPATIENT
Start: 2018-06-22 | End: 2018-06-22

## 2018-06-22 RX ADMIN — LIDOCAINE HYDROCHLORIDE 4 ML: 10 INJECTION INFILTRATION; PERINEURAL at 05:06

## 2018-06-22 RX ADMIN — BUPIVACAINE HYDROCHLORIDE 10 MG: 2.5 INJECTION, SOLUTION INFILTRATION; PERINEURAL at 05:06

## 2018-06-22 RX ADMIN — TRIAMCINOLONE ACETONIDE 40 MG: 40 INJECTION, SUSPENSION INTRA-ARTICULAR; INTRAMUSCULAR at 05:06

## 2018-06-24 NOTE — PROGRESS NOTES
CC: left Shoulder pain    72 y.o. Male reports that the pain is severe and not responding to any conservative care.  Pain has been present for 4 weeks with no inciting injury or trauma, but he does frequently lift his wife's 160lb scooter.  Pain is located mainly over anterior shoulder.    He reports that the pain is worse with overhead activity. It also bothers him at night.    He is s/p Left shoulder RCR, SAD, and biceps auto-tenodesis in 7/2010    PAST MEDICAL HISTORY:   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 8/2/2012    Type II or unspecified type diabetes mellitus with neurological manifestations, uncontrolled(250.62) 3/8/2013    Urinary tract infection      PAST SURGICAL HISTORY:   Past Surgical History:   Procedure Laterality Date    BACK SURGERY      EYE SURGERY      HERNIA REPAIR      renal stones      SHOULDER OPEN ROTATOR CUFF REPAIR       FAMILY HISTORY:   Family History   Problem Relation Age of Onset    Prostate cancer Neg Hx     Kidney disease Neg Hx      SOCIAL HISTORY:   Social History     Social History    Marital status:      Spouse name: N/A    Number of children: N/A    Years of education: N/A     Occupational History    Not on file.     Social History Main Topics    Smoking status: Former Smoker     Quit date: 1/1/1983    Smokeless tobacco: Never Used    Alcohol use No    Drug use: No    Sexual activity: Not Currently     Other Topics Concern    Not on file     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       MEDICATIONS:   Current Outpatient Prescriptions:     aspirin (ECOTRIN) 81 MG EC tablet, Take 81 mg by mouth once daily., Disp: , Rfl:     budesonide (RINOCORT AQUA) 32 mcg/actuation nasal spray, 1 spray (32 mcg total) by Nasal route once daily.,  "Disp: 8.6 g, Rfl: 11    budesonide (RINOCORT AQUA) 32 mcg/actuation nasal spray, 1 spray (32 mcg total) by Nasal route once daily., Disp: 8.6 g, Rfl: 11    CARTIA  mg Cp24, TAKE 1 CAPSULE(120 MG) BY MOUTH EVERY DAY, Disp: 30 capsule, Rfl: 0    celecoxib (CELEBREX) 200 MG capsule, TAKE 1 CAPSULE(200 MG) BY MOUTH EVERY DAY, Disp: 30 capsule, Rfl: 0    ergocalciferol (ERGOCALCIFEROL) 50,000 unit Cap, , Disp: , Rfl:     gabapentin (NEURONTIN) 300 MG capsule, TAKE 1 CAPSULE BY MOUTH TWICE DAILY, Disp: 180 capsule, Rfl: 0    hydrocodone-acetaminophen 5-325mg (NORCO) 5-325 mg per tablet, Take 1 tablet by mouth every 6 (six) hours as needed for Pain., Disp: 10 tablet, Rfl: 0    insulin aspart (NOVOLOG FLEXPEN) 100 unit/mL InPn pen, Inject 24 Units into the skin 3 (three) times daily with meals., Disp: 21.6 mL, Rfl: 11    insulin glargine (LANTUS) 100 unit/mL injection, Inject 30 Units into the skin every evening., Disp: 9 mL, Rfl: 11    insulin syringe-needle U-100 (INSULIN SYRINGE MICROFINE) 0.3 mL 28 gauge x 1/2" Syrg, Use with Lantus, Disp: 100 each, Rfl: 6    losartan (COZAAR) 25 MG tablet, TAKE 1 TABLET BY MOUTH DAILY, Disp: 90 tablet, Rfl: 0    metFORMIN (GLUCOPHAGE) 500 MG tablet, Take 1 tablet (500 mg total) by mouth 2 (two) times daily with meals., Disp: 180 tablet, Rfl: 1    MULTIVITAMIN ORAL, 1 tablet., Disp: , Rfl:     OMEPRAZOLE (PRILOSEC ORAL), Take 1 tablet by mouth daily as needed. , Disp: , Rfl:     pen needle, diabetic (PEN NEEDLE) 30 gauge x 5/16" Ndle, 1 Units by Misc.(Non-Drug; Combo Route) route 3 (three) times daily., Disp: 100 each, Rfl: 3    penicillin v potassium (VEETID) 500 MG tablet, TK 1 T PO QID TAT, Disp: , Rfl: 0    polycarbophil (FIBERCON) 625 mg tablet, Take 1 tablet by mouth., Disp: , Rfl:     pravastatin (PRAVACHOL) 40 MG tablet, TAKE 1 TABLET BY MOUTH EVERY DAY, Disp: 90 tablet, Rfl: 0    levocetirizine (XYZAL) 5 MG tablet, Take 1 tablet (5 mg total) by mouth " "every evening., Disp: 30 tablet, Rfl: 0  ALLERGIES:   Review of patient's allergies indicates:   Allergen Reactions    Iodine      Other reaction(s): swelling  Other reaction(s): Itching  Other reaction(s): Rash       VITAL SIGNS: /67   Pulse 93   Ht 6' 2" (1.88 m)   Wt 96.6 kg (213 lb)   BMI 27.35 kg/m²      Review of Systems   Constitution: Negative. Negative for chills, fever and night sweats.   HENT: Negative for congestion and headaches.    Eyes: Negative for blurred vision, left vision loss and right vision loss.   Cardiovascular: Negative for chest pain and syncope.   Respiratory: Negative for cough and shortness of breath.    Endocrine: Negative for polydipsia, polyphagia and polyuria.   Hematologic/Lymphatic: Negative for bleeding problem. Does not bruise/bleed easily.   Skin: Negative for dry skin, itching and rash.   Musculoskeletal: Negative for falls and muscle weakness.   Gastrointestinal: Negative for abdominal pain and bowel incontinence.   Genitourinary: Negative for bladder incontinence and nocturia.   Neurological: Negative for disturbances in coordination, loss of balance and seizures.   Psychiatric/Behavioral: Negative for depression. The patient does not have insomnia.    Allergic/Immunologic: Negative for hives and persistent infections.       PHYSICAL EXAMINATION:  General:  The patient is alert and oriented x 3.  Mood is pleasant.  Observation of ears, eyes and nose reveal no gross abnormalities.  HEENT: NCAT, sclera nonicteric  Lungs: Respirations are equal and unlabored.  Gait is coordinated. Patient can toe walk and heel walk without difficulty.  Cardiovascular: There are no swelling or varicosities present.   Lymphatic: Negative for adenopathy       left Shoulder / Upper Extremity Exam    OBSERVATION:     Swelling  none  Deformity  none   Discoloration  none   Scapular winging none   Scars   none  Atrophy  none    TENDERNESS / CREPITUS (T/C):          T/C      T/C   Clavicle "   -/-  SUPRAspinatus    -/-   AC Jt.    -/-  INFRAspinatus  -/-   SC Jt.    -/-  Deltoid    -/-   G. Tuberosity  -/-  LH BICEP groove  +/-   Acromion:  -/-  Midline Neck   -/-   Scapular Spine -/-  Trapezium   -/-   SMA Scapula  -/-  GH jt. line - post  -/-   Scapulothoracic  -/-         ROM: (* = with pain)  Right shoulder   Left shoulder        AROM (PROM)   AROM (PROM)   FE    170° (175°)     110° (175°)     ER at 0°    60°  (65°)    50°  (65°)   ER at 90° ABD  90°  (90°)    70°  (90°)   IR at 90°  ABD   NA  (40°)     NA  (40°)      IR (spine level)   T10     L3    STRENGTH: (* = with pain) RIGHT SHOULDER  LEFT SHOULDER   SCAPTION at 0   5/5    5/5    SCAPTION at 30   5/5    5/5    IR    5/5    5/5   ER    5/5    5/5   BICEPS   5/5    5/5   Deltoid    5/5    5/5     SIGNS:  Painful side       NEER   +   ODAVIDS  +   HERNANDEZ   neg    SPEEDS  Neg   DROP ARM   neg   BELLY PRESS Neg   Superior escape none    LIFT-OFF  Neg   X-Body ADD    neg    MOVING VALGUS Neg      STABILITY TESTING    RIGHT SHOULDER   LEFT SHOULDER       Translation    Anterior  up face     up face    Posterior  up face    up face    Sulcus   < 10mm    < 10 mm    Signs    Apprehension   neg      Neg    Relocation   no change     no change    Jerk test  neg     Neg      EXTREMITY NEURO-VASCULAR EXAM    Sensation grossly intact to light touch all dermatomal regions.    DTR 2+ Biceps, Triceps, BR and Negative Ingriss sign   Grossly intact motor function at Elbow, Wrist and Hand   Distal pulses radial and ulnar 2+, brisk cap refill, symmetric.      NECK:  Painless FROM and spinous processes non-tender. Negative Spurlings sign.      OTHER FINDINGS:    XRAYS:  Shoulder trauma series left,  were ordered and reviewed by me. No convincing fracture or dislocation is noted. The osseous structures appear well mineralized and well aligned    MRI left shoulder from 6/15/18:    Postoperative changes of the supraspinatus and infraspinatus tendons with  tendinosis.    High grade subscapularis tear.    Probable biceps tendon tear with medial subluxation and distal retraction.    Marked anterior humeral head subchondral edema and cystic change.         Assessment:  1. Shoulder pain, left   2. Left shoulder impingement    Plan:       1. MRI results discussed with patient.   2. PROCEDURE NOTE:   After cortisone consent and post-injection information was given, the shoulder was prepped with cloraprep. The left subacromial space of the shoulder was injected with 1 cc 40 mg kenalog and 4 cc lidocaine and 4 cc .5% marcaine.   Bandaid was applied. Patient was reminded to rest with RICE for 48 hours post injection and to call clinic immediately for any adverse side effects as explained in clinic today.  Patient was warned of possible blood sugar and/or blood pressure changes during that time. Told to call clinic or go to ED if the changes become concerning.    3. OTC pain control  4. Ice compresses prn  5. Patient refuses to do formal PT. Given HEP to help shoulder pain.     All patients questions were answered. Patient was advised to call us with any concerns or questions.

## 2018-06-29 ENCOUNTER — OFFICE VISIT (OUTPATIENT)
Dept: FAMILY MEDICINE | Facility: CLINIC | Age: 73
End: 2018-06-29
Payer: MEDICARE

## 2018-06-29 VITALS
BODY MASS INDEX: 26.79 KG/M2 | HEART RATE: 90 BPM | HEIGHT: 74 IN | SYSTOLIC BLOOD PRESSURE: 130 MMHG | DIASTOLIC BLOOD PRESSURE: 84 MMHG | WEIGHT: 208.75 LBS

## 2018-06-29 DIAGNOSIS — T73.3XXA FATIGUE DUE TO EXCESSIVE EXERTION, INITIAL ENCOUNTER: ICD-10-CM

## 2018-06-29 DIAGNOSIS — R06.00 PND (PAROXYSMAL NOCTURNAL DYSPNEA): ICD-10-CM

## 2018-06-29 DIAGNOSIS — T38.0X5A STEROID-INDUCED HYPERGLYCEMIA: ICD-10-CM

## 2018-06-29 DIAGNOSIS — Z79.4 TYPE 2 DIABETES MELLITUS WITH HYPERGLYCEMIA, WITH LONG-TERM CURRENT USE OF INSULIN: Primary | ICD-10-CM

## 2018-06-29 DIAGNOSIS — E11.65 TYPE 2 DIABETES MELLITUS WITH HYPERGLYCEMIA, WITH LONG-TERM CURRENT USE OF INSULIN: Primary | ICD-10-CM

## 2018-06-29 DIAGNOSIS — R06.02 SOB (SHORTNESS OF BREATH): ICD-10-CM

## 2018-06-29 DIAGNOSIS — E16.2 HYPOGLYCEMIA: ICD-10-CM

## 2018-06-29 DIAGNOSIS — I10 ESSENTIAL HYPERTENSION: ICD-10-CM

## 2018-06-29 DIAGNOSIS — M54.2 NECK PAIN OF OVER 3 MONTHS DURATION: ICD-10-CM

## 2018-06-29 DIAGNOSIS — R73.9 STEROID-INDUCED HYPERGLYCEMIA: ICD-10-CM

## 2018-06-29 PROCEDURE — 3075F SYST BP GE 130 - 139MM HG: CPT | Mod: CPTII,S$GLB,, | Performed by: FAMILY MEDICINE

## 2018-06-29 PROCEDURE — 99499 UNLISTED E&M SERVICE: CPT | Mod: S$PBB,,, | Performed by: FAMILY MEDICINE

## 2018-06-29 PROCEDURE — 3079F DIAST BP 80-89 MM HG: CPT | Mod: CPTII,S$GLB,, | Performed by: FAMILY MEDICINE

## 2018-06-29 PROCEDURE — 99214 OFFICE O/P EST MOD 30 MIN: CPT | Mod: S$GLB,,, | Performed by: FAMILY MEDICINE

## 2018-06-29 PROCEDURE — 3046F HEMOGLOBIN A1C LEVEL >9.0%: CPT | Mod: CPTII,S$GLB,, | Performed by: FAMILY MEDICINE

## 2018-06-29 PROCEDURE — 99999 PR PBB SHADOW E&M-EST. PATIENT-LVL III: CPT | Mod: PBBFAC,,, | Performed by: FAMILY MEDICINE

## 2018-06-29 RX ORDER — INSULIN GLARGINE 100 [IU]/ML
35 INJECTION, SOLUTION SUBCUTANEOUS NIGHTLY
Qty: 10.5 ML | Refills: 11 | Status: SHIPPED | OUTPATIENT
Start: 2018-06-29 | End: 2019-01-04 | Stop reason: SDUPTHER

## 2018-06-29 RX ORDER — INSULIN ASPART 100 [IU]/ML
15 INJECTION, SOLUTION INTRAVENOUS; SUBCUTANEOUS
Qty: 13.5 ML | Refills: 11 | Status: SHIPPED | OUTPATIENT
Start: 2018-06-29 | End: 2019-10-05 | Stop reason: SDUPTHER

## 2018-06-29 NOTE — PROGRESS NOTES
Subjective:       Patient ID: Kamar Muñoz is a 72 y.o. male.    Chief Complaint: Follow-up and Hypertension    72 years old male came to the clinic for diabetes check.  Patient recently with steroid injection for his shoulder.  Patient blood sugar over 400s sometimes.  Patient using NovoLog with a sliding scale.  Patient also reports episodic shortness of breath associated with shortness of breath while he is sleeping.  No orthopnea or chest pain. Blood pressure today stable.  Patient with chronic neck pain unfortunately with no significant improvement after epidural injection and chiropractor treatment.      Hypertension   Associated symptoms include palpitations. Pertinent negatives include no chest pain.     Review of Systems   Constitutional: Positive for fatigue.   HENT: Negative.    Eyes: Negative.    Respiratory: Negative.    Cardiovascular: Positive for palpitations. Negative for chest pain and leg swelling.   Gastrointestinal: Negative.    Endocrine: Negative for cold intolerance, heat intolerance, polydipsia, polyphagia and polyuria.   Genitourinary: Negative.    Musculoskeletal: Negative.    Skin: Negative.    Neurological: Negative.    Psychiatric/Behavioral: Negative.        Objective:      Physical Exam   Constitutional: He is oriented to person, place, and time. He appears well-developed and well-nourished. No distress.   HENT:   Head: Normocephalic and atraumatic.   Right Ear: External ear normal.   Left Ear: External ear normal.   Nose: Nose normal.   Mouth/Throat: Oropharynx is clear and moist. No oropharyngeal exudate.   Eyes: Conjunctivae and EOM are normal. Pupils are equal, round, and reactive to light. Right eye exhibits no discharge. Left eye exhibits no discharge. No scleral icterus.   Neck: Normal range of motion. Neck supple. No JVD present. No tracheal deviation present. No thyromegaly present.   Cardiovascular: Normal rate, regular rhythm, normal heart sounds and intact distal  pulses.  Exam reveals no gallop and no friction rub.    No murmur heard.  Pulmonary/Chest: Effort normal and breath sounds normal. No stridor. No respiratory distress. He has no wheezes. He has no rales. He exhibits no tenderness.   Abdominal: Soft. Bowel sounds are normal. He exhibits no distension and no mass. There is no tenderness. There is no rebound and no guarding.   Musculoskeletal: Normal range of motion. He exhibits no edema.        Cervical back: He exhibits tenderness.   Lymphadenopathy:     He has no cervical adenopathy.   Neurological: He is alert and oriented to person, place, and time. He has normal reflexes. He displays normal reflexes. No cranial nerve deficit. He exhibits normal muscle tone. Coordination normal.   Skin: Skin is warm and dry. No rash noted. He is not diaphoretic. No erythema. No pallor.   Psychiatric: He has a normal mood and affect. His behavior is normal. Judgment and thought content normal.   Nursing note and vitals reviewed.      Assessment:       1. Type 2 diabetes mellitus with hyperglycemia, with long-term current use of insulin    2. Essential hypertension    3. Hypoglycemia    4. Steroid-induced hyperglycemia    5. SOB (shortness of breath)    6. PND (paroxysmal nocturnal dyspnea)    7. Fatigue due to excessive exertion, initial encounter    8. Neck pain of over 3 months duration        Plan:         Kamar was seen today for follow-up and hypertension.    Diagnoses and all orders for this visit:    Type 2 diabetes mellitus with hyperglycemia, with long-term current use of insulin  -     insulin glargine (LANTUS) 100 unit/mL injection; Inject 35 Units into the skin every evening.  -     insulin aspart U-100 (NOVOLOG FLEXPEN U-100 INSULIN) 100 unit/mL InPn pen; Inject 15 Units into the skin 3 (three) times daily with meals.  -     Hemoglobin A1c; Future  -     Comprehensive metabolic panel; Future    Essential hypertension  -     Comprehensive metabolic panel; Future  -      Lipid panel; Future    Hypoglycemia    Steroid-induced hyperglycemia  -     insulin glargine (LANTUS) 100 unit/mL injection; Inject 35 Units into the skin every evening.  -     insulin aspart U-100 (NOVOLOG FLEXPEN U-100 INSULIN) 100 unit/mL InPn pen; Inject 15 Units into the skin 3 (three) times daily with meals.    SOB (shortness of breath)  -     2D echo with color flow doppler; Future  -     Brain natriuretic peptide; Future    PND (paroxysmal nocturnal dyspnea)  -     2D echo with color flow doppler; Future  -     Brain natriuretic peptide; Future    Fatigue due to excessive exertion, initial encounter  -     2D echo with color flow doppler; Future  -     Brain natriuretic peptide; Future    Neck pain of over 3 months duration    Continue monitoring blood pressure at home, low sodium diet.  Continue monitoring blood sugar at home,ADA diet.

## 2018-06-29 NOTE — PATIENT INSTRUCTIONS
Diabetes: Activity Tips    Being more active can help you manage your diabetes. The tips on this sheet can help you get the most from your exercise. They can also help you stay safe.  Staying Active  Its important for adults to spend less time sitting and being inactive. This is especially true if you have type 2 diabetes. When you are sitting for long periods of time, get up for short sessions of light activity every 30 minutes.  You should aim for at least 150 minutes a week of exercise or physical activity. Dont let more than 2 days go by without being active.  Benefit from briskness  Brisk activity gets your heart beating faster. This can help you increase your fitness, lose extra weight, and manage your blood sugar level. Try brisk walking. Or, if you have foot or leg problems, you can try swimming or bike riding. You can break up your exercise into chunks throughout the day. Work up to at least 30 minutes of steady, brisk exercise on most days.  Warm up and cool down  Warming up and cooling down reduce your risk of injury. They also help limit muscle soreness. Do a mild version of your activity for 5 minutes before and after your routine. You can also learn stretches that will help keep your muscles loose. Your healthcare provider may show you good ways to warm up and stretch.  Do the talk-sing test  The talk-sing test is a simple way to tell how hard youre exercising. If you can talk while exercising, youre in a safe range. If youre out of breath, slow down. If you can carry a tune, its time to  the pace. Walk up a hill. Increase the resistance on your stationary bike. Or swim faster.  What about eating?  You may be told to plan your exercise for 1 to 2 hours after a meal. In most cases, you dont need to eat while being active. If you take insulin or medicine that can cause low blood sugar, test your blood sugar before exercising. And carry a fast-acting sugar that will raise your blood sugar  level quickly. This includes glucose tablets or hard candy. Use it if you feel low blood sugar symptoms.  Safety tips  These tips can help you stay safe as you become fit:  · Exercise with a friend or carry a cell phone if you have one.  · Carry or wear identification, such as a necklace or bracelet, that says you have diabetes.  · Use the proper footwear and safety equipment for your activity.  · Drink water before, during, and after exercise.  · Dress properly for the weather.  · Dont exercise in very hot or very cold weather.  · Dont exercise if you are sick.  · If you are instructed to do so, test your blood sugar before and after you exercise. Have a small carbohydrate snack if your blood sugar is low before you start exercising.   When to stop exercising and call your healthcare provider  Stop exercising and call your healthcare provider right away if you notice any of the following:  · Pain, pressure, tightness, or heaviness in the chest  · Pain or heaviness in the neck, shoulders, back, arms, legs, or feet  · Unusual shortness of breath  · Dizziness or lightheadedness  · Unusually rapid or slow pulse  · Increased joint or muscle pain  · Nausea or vomiting  Date Last Reviewed: 5/1/2016  © 6434-3960 [employer]. [address] [place] [address], [place] [address]. All rights reserved. This information is not intended as a substitute for professional medical care. Always follow your healthcare professional's instructions.

## 2018-06-29 NOTE — MEDICAL/APP STUDENT
Subjective:       Patient ID: Kamar Muñoz is a 72 y.o. male.    Chief Complaint: Follow-up and Hypertension    HPI   Kamar Muñoz is 72 y.o. male with hx of arthritis, CAD, DM2, HTN, hyperlipidemia coming in with F/U.   Last visit was on 2/16/18 was referred to pain clinic for neck pain and rec'd injection on left shoulder and neck. Pt says shoulder inj was effective but not for the neck. He got MRI cervical spine and CT scan. Pt is concerned about financially with every injection. Pt is taking gabapentin and celebrex. Pt is not open to PT and sticks to home exercises.    Pt has been having SOB intermittently, unsure about C/P but has palpitations. Does not do any much of exertional activities. Intermittently feet swells, had 1 PND event couple weeks ago, but does not need to prop head up at night. No dysuria, hematuria, vision changes, headaches.     DM2 - monitors atleast once a day in the evening usually 160s, highest 270 and lowest 39. But after steroid injection last Friday has been 400-500s. Hypoglycemic events 3x/week. Takes lantus and novolog and metformin 2x day. Pt is taking ARB. Feels dry mouth due to polyuria but no dizziness, paresthesia, numbness.     HTN - does not monitor BP at home. Takes Cartia and losartan, aspirin everyday.       Review of Systems   Constitutional: Negative for chills and fever.   HENT: Positive for postnasal drip. Negative for congestion and sore throat.    Eyes: Positive for visual disturbance. Negative for pain.   Respiratory: Positive for shortness of breath. Negative for cough and wheezing.    Cardiovascular: Positive for palpitations. Negative for chest pain and leg swelling.   Gastrointestinal: Negative for abdominal pain, blood in stool, constipation, diarrhea, nausea and vomiting.   Endocrine: Positive for polydipsia and polyuria. Negative for polyphagia.   Genitourinary: Negative for dysuria and hematuria.   Musculoskeletal: Positive for arthralgias and  "neck pain.   Skin: Negative for color change and rash.   Neurological: Negative for dizziness, seizures, weakness and numbness.   Psychiatric/Behavioral: Negative for dysphoric mood. The patient is not nervous/anxious.        Objective:       Vitals:    06/29/18 1019   BP: 130/84   Pulse: 90   Weight: 94.7 kg (208 lb 12.4 oz)   Height: 6' 2" (1.88 m)   PainSc: 0-No pain     Physical Exam   Constitutional: He is oriented to person, place, and time. He appears well-developed and well-nourished.   HENT:   Head: Normocephalic and atraumatic.   Right Ear: External ear normal.   Left Ear: External ear normal.   Eyes: EOM are normal. Pupils are equal, round, and reactive to light.   Neck: Neck supple. No neck rigidity.   Range of motion towards left side is reduced with pain   Cardiovascular: Normal rate, regular rhythm and normal heart sounds.    Pulmonary/Chest: Effort normal and breath sounds normal.   Abdominal: Soft. Bowel sounds are normal.   Musculoskeletal: Normal range of motion.   Neurological: He is alert and oriented to person, place, and time.       Assessment:       1. Type 2 diabetes mellitus with hyperglycemia, with long-term current use of insulin    2. Essential hypertension    3. Hypoglycemia    4. Steroid-induced hyperglycemia    5. SOB (shortness of breath)    6. PND (paroxysmal nocturnal dyspnea)    7. Fatigue due to excessive exertion, initial encounter    8. Neck pain of over 3 months duration        Plan:       Kamar was seen today for follow-up and hypertension.    Diagnoses and all orders for this visit:    Type 2 diabetes mellitus with hyperglycemia, with long-term current use of insulin  -     insulin glargine (LANTUS) 100 unit/mL injection; Inject 35 Units into the skin every evening.  -     insulin aspart U-100 (NOVOLOG FLEXPEN U-100 INSULIN) 100 unit/mL InPn pen; Inject 15 Units into the skin 3 (three) times daily with meals.  -     Hemoglobin A1c; Future  -     Comprehensive metabolic " panel; Future    Essential hypertension  -     Comprehensive metabolic panel; Future  -     Lipid panel; Future    Hypoglycemia        - RTC to clinic once the steroid injection is cleared up in system and adjust insulin regimen    Steroid-induced hyperglycemia  -     insulin glargine (LANTUS) 100 unit/mL injection; Inject 35 Units into the skin every evening.  -     insulin aspart U-100 (NOVOLOG FLEXPEN U-100 INSULIN) 100 unit/mL InPn pen; Inject 15 Units into the skin 3 (three) times daily with meals.    SOB (shortness of breath)  -     2D echo with color flow doppler; Future  -     Brain natriuretic peptide; Future    PND (paroxysmal nocturnal dyspnea)  -     2D echo with color flow doppler; Future  -     Brain natriuretic peptide; Future    Fatigue due to excessive exertion, initial encounter  -     2D echo with color flow doppler; Future  -     Brain natriuretic peptide; Future    Neck pain of over 3 months duration        - Patient does not desire further injections or PT.        -     Cont' current pain management regimen celebrex and gabapentin    RTC in 2-4w to recheck insulin regimen.     Thank you for the opportunity to assist in the care of your patient.    Cordelia Beckman   MS4  [facility]

## 2018-07-02 ENCOUNTER — LAB VISIT (OUTPATIENT)
Dept: LAB | Facility: HOSPITAL | Age: 73
End: 2018-07-02
Attending: FAMILY MEDICINE
Payer: MEDICARE

## 2018-07-02 DIAGNOSIS — R06.00 PND (PAROXYSMAL NOCTURNAL DYSPNEA): ICD-10-CM

## 2018-07-02 DIAGNOSIS — Z79.4 TYPE 2 DIABETES MELLITUS WITH HYPERGLYCEMIA, WITH LONG-TERM CURRENT USE OF INSULIN: ICD-10-CM

## 2018-07-02 DIAGNOSIS — R06.02 SOB (SHORTNESS OF BREATH): ICD-10-CM

## 2018-07-02 DIAGNOSIS — I10 ESSENTIAL HYPERTENSION: ICD-10-CM

## 2018-07-02 DIAGNOSIS — E11.65 TYPE 2 DIABETES MELLITUS WITH HYPERGLYCEMIA, WITH LONG-TERM CURRENT USE OF INSULIN: ICD-10-CM

## 2018-07-02 DIAGNOSIS — T73.3XXA FATIGUE DUE TO EXCESSIVE EXERTION, INITIAL ENCOUNTER: ICD-10-CM

## 2018-07-02 LAB
ALBUMIN SERPL BCP-MCNC: 3.3 G/DL
ALP SERPL-CCNC: 96 U/L
ALT SERPL W/O P-5'-P-CCNC: 14 U/L
ANION GAP SERPL CALC-SCNC: 8 MMOL/L
AST SERPL-CCNC: 14 U/L
BILIRUB SERPL-MCNC: 0.7 MG/DL
BNP SERPL-MCNC: 87 PG/ML
BUN SERPL-MCNC: 24 MG/DL
CALCIUM SERPL-MCNC: 9.1 MG/DL
CHLORIDE SERPL-SCNC: 105 MMOL/L
CHOLEST SERPL-MCNC: 143 MG/DL
CHOLEST/HDLC SERPL: 2.2 {RATIO}
CO2 SERPL-SCNC: 27 MMOL/L
CREAT SERPL-MCNC: 1.2 MG/DL
EST. GFR  (AFRICAN AMERICAN): >60 ML/MIN/1.73 M^2
EST. GFR  (NON AFRICAN AMERICAN): 59.2 ML/MIN/1.73 M^2
ESTIMATED AVG GLUCOSE: 255 MG/DL
GLUCOSE SERPL-MCNC: 153 MG/DL
HBA1C MFR BLD HPLC: 10.5 %
HDLC SERPL-MCNC: 65 MG/DL
HDLC SERPL: 45.5 %
LDLC SERPL CALC-MCNC: 62.6 MG/DL
NONHDLC SERPL-MCNC: 78 MG/DL
POTASSIUM SERPL-SCNC: 4.5 MMOL/L
PROT SERPL-MCNC: 6.4 G/DL
SODIUM SERPL-SCNC: 140 MMOL/L
TRIGL SERPL-MCNC: 77 MG/DL

## 2018-07-02 PROCEDURE — 83880 ASSAY OF NATRIURETIC PEPTIDE: CPT

## 2018-07-02 PROCEDURE — 80061 LIPID PANEL: CPT

## 2018-07-02 PROCEDURE — 83036 HEMOGLOBIN GLYCOSYLATED A1C: CPT

## 2018-07-02 PROCEDURE — 80053 COMPREHEN METABOLIC PANEL: CPT

## 2018-07-02 PROCEDURE — 36415 COLL VENOUS BLD VENIPUNCTURE: CPT | Mod: PO

## 2018-07-06 ENCOUNTER — HOSPITAL ENCOUNTER (OUTPATIENT)
Dept: CARDIOLOGY | Facility: HOSPITAL | Age: 73
Discharge: HOME | End: 2018-07-06
Attending: FAMILY MEDICINE
Payer: MEDICARE

## 2018-07-06 DIAGNOSIS — R06.02 SOB (SHORTNESS OF BREATH): ICD-10-CM

## 2018-07-06 DIAGNOSIS — R06.00 PND (PAROXYSMAL NOCTURNAL DYSPNEA): ICD-10-CM

## 2018-07-06 DIAGNOSIS — T73.3XXA FATIGUE DUE TO EXCESSIVE EXERTION, INITIAL ENCOUNTER: ICD-10-CM

## 2018-07-06 LAB
DIASTOLIC DYSFUNCTION: NO
ESTIMATED PA SYSTOLIC PRESSURE: 27.8
GLOBAL PERICARDIAL EFFUSION: NORMAL
MITRAL VALVE REGURGITATION: NORMAL
RETIRED EF AND QEF - SEE NOTES: 65 (ref 55–65)

## 2018-07-06 PROCEDURE — 93306 TTE W/DOPPLER COMPLETE: CPT

## 2018-07-06 PROCEDURE — 93306 TTE W/DOPPLER COMPLETE: CPT | Mod: 26,,, | Performed by: INTERNAL MEDICINE

## 2018-07-10 RX ORDER — GABAPENTIN 300 MG/1
CAPSULE ORAL
Qty: 180 CAPSULE | Refills: 0 | Status: SHIPPED | OUTPATIENT
Start: 2018-07-10 | End: 2018-10-07 | Stop reason: SDUPTHER

## 2018-07-16 RX ORDER — PRAVASTATIN SODIUM 40 MG/1
TABLET ORAL
Qty: 90 TABLET | Refills: 0 | Status: SHIPPED | OUTPATIENT
Start: 2018-07-16 | End: 2018-10-07 | Stop reason: SDUPTHER

## 2018-07-24 ENCOUNTER — PES CALL (OUTPATIENT)
Dept: ADMINISTRATIVE | Facility: CLINIC | Age: 73
End: 2018-07-24

## 2018-07-30 ENCOUNTER — OFFICE VISIT (OUTPATIENT)
Dept: SPORTS MEDICINE | Facility: CLINIC | Age: 73
End: 2018-07-30
Payer: MEDICARE

## 2018-07-30 VITALS
HEART RATE: 93 BPM | SYSTOLIC BLOOD PRESSURE: 133 MMHG | HEIGHT: 74 IN | WEIGHT: 208 LBS | BODY MASS INDEX: 26.69 KG/M2 | DIASTOLIC BLOOD PRESSURE: 78 MMHG

## 2018-07-30 DIAGNOSIS — M25.512 ACUTE PAIN OF LEFT SHOULDER: Primary | ICD-10-CM

## 2018-07-30 DIAGNOSIS — M25.812 SHOULDER IMPINGEMENT, LEFT: ICD-10-CM

## 2018-07-30 PROCEDURE — 99214 OFFICE O/P EST MOD 30 MIN: CPT | Mod: S$GLB,,, | Performed by: PHYSICIAN ASSISTANT

## 2018-07-30 PROCEDURE — 3075F SYST BP GE 130 - 139MM HG: CPT | Mod: CPTII,S$GLB,, | Performed by: PHYSICIAN ASSISTANT

## 2018-07-30 PROCEDURE — 99999 PR PBB SHADOW E&M-EST. PATIENT-LVL IV: CPT | Mod: PBBFAC,,, | Performed by: PHYSICIAN ASSISTANT

## 2018-07-30 PROCEDURE — 3078F DIAST BP <80 MM HG: CPT | Mod: CPTII,S$GLB,, | Performed by: PHYSICIAN ASSISTANT

## 2018-07-30 NOTE — PROGRESS NOTES
CC: left Shoulder pain    72 y.o. Male reports that the pain has greatly improved of the left shoulder.  Pain started around play of this year with no inciting injury or trauma, but he does frequently lift his wife's 160lb scooter.  Pain is located mainly over anterior shoulder. He reports that previous sub-acromial steroid injection 6 weeks ago helped significantly with his shoulder pain and even improved his neck pain on the left side. He is pleased. Currently doing HEP at home.     He does report that the 40mg steroid injection elevated his BG to 596 for about 1 week. He controlled this with his insulin.     He reports that the pain was worse with overhead activity. Pain was also bothering him at night.    He is s/p Left shoulder RCR, SAD, and biceps auto-tenodesis in 7/2010    PAST MEDICAL HISTORY:   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 8/2/2012    Type II or unspecified type diabetes mellitus with neurological manifestations, uncontrolled(250.62) 3/8/2013    Urinary tract infection      PAST SURGICAL HISTORY:   Past Surgical History:   Procedure Laterality Date    BACK SURGERY      EYE SURGERY      HERNIA REPAIR      renal stones      SHOULDER OPEN ROTATOR CUFF REPAIR       FAMILY HISTORY:   Family History   Problem Relation Age of Onset    Prostate cancer Neg Hx     Kidney disease Neg Hx      SOCIAL HISTORY:   Social History     Social History    Marital status:      Spouse name: N/A    Number of children: N/A    Years of education: N/A     Occupational History    Not on file.     Social History Main Topics    Smoking status: Former Smoker     Quit date: 1/1/1983    Smokeless tobacco: Never Used    Alcohol use No    Drug use: No    Sexual activity: Not Currently     Other Topics Concern    Not on file     Social  "History Narrative    [employer] juancarlos. . Wife is disabled due to back problems.       MEDICATIONS:   Current Outpatient Prescriptions:     aspirin (ECOTRIN) 81 MG EC tablet, Take 81 mg by mouth once daily., Disp: , Rfl:     CARTIA  mg Cp24, TAKE 1 CAPSULE(120 MG) BY MOUTH EVERY DAY, Disp: 30 capsule, Rfl: 0    celecoxib (CELEBREX) 200 MG capsule, TAKE 1 CAPSULE(200 MG) BY MOUTH EVERY DAY, Disp: 30 capsule, Rfl: 0    ergocalciferol (ERGOCALCIFEROL) 50,000 unit Cap, , Disp: , Rfl:     gabapentin (NEURONTIN) 300 MG capsule, TAKE 1 CAPSULE BY MOUTH TWICE DAILY, Disp: 180 capsule, Rfl: 0    insulin aspart U-100 (NOVOLOG FLEXPEN U-100 INSULIN) 100 unit/mL InPn pen, Inject 15 Units into the skin 3 (three) times daily with meals., Disp: 13.5 mL, Rfl: 11    insulin glargine (LANTUS) 100 unit/mL injection, Inject 35 Units into the skin every evening., Disp: 10.5 mL, Rfl: 11    insulin syringe-needle U-100 (INSULIN SYRINGE MICROFINE) 0.3 mL 28 gauge x 1/2" Syrg, Use with Lantus, Disp: 100 each, Rfl: 6    levocetirizine (XYZAL) 5 MG tablet, Take 1 tablet (5 mg total) by mouth every evening., Disp: 30 tablet, Rfl: 0    losartan (COZAAR) 25 MG tablet, TAKE 1 TABLET BY MOUTH DAILY, Disp: 90 tablet, Rfl: 0    metFORMIN (GLUCOPHAGE) 500 MG tablet, Take 1 tablet (500 mg total) by mouth 2 (two) times daily with meals., Disp: 180 tablet, Rfl: 1    MULTIVITAMIN ORAL, 1 tablet., Disp: , Rfl:     OMEPRAZOLE (PRILOSEC ORAL), Take 1 tablet by mouth daily as needed. , Disp: , Rfl:     pen needle, diabetic (PEN NEEDLE) 30 gauge x 5/16" Ndle, 1 Units by Misc.(Non-Drug; Combo Route) route 3 (three) times daily., Disp: 100 each, Rfl: 3    polycarbophil (FIBERCON) 625 mg tablet, Take 1 tablet by mouth., Disp: , Rfl:     pravastatin (PRAVACHOL) 40 MG tablet, TAKE 1 TABLET BY MOUTH EVERY DAY, Disp: 90 tablet, Rfl: 0    budesonide (RINOCORT AQUA) 32 mcg/actuation nasal spray, 1 spray (32 mcg total) by Nasal route once " "daily., Disp: 8.6 g, Rfl: 11    budesonide (RINOCORT AQUA) 32 mcg/actuation nasal spray, 1 spray (32 mcg total) by Nasal route once daily., Disp: 8.6 g, Rfl: 11    hydrocodone-acetaminophen 5-325mg (NORCO) 5-325 mg per tablet, Take 1 tablet by mouth every 6 (six) hours as needed for Pain., Disp: 10 tablet, Rfl: 0    penicillin v potassium (VEETID) 500 MG tablet, TK 1 T PO QID TAT, Disp: , Rfl: 0  ALLERGIES:   Review of patient's allergies indicates:   Allergen Reactions    Iodine      Other reaction(s): swelling  Other reaction(s): Itching  Other reaction(s): Rash       VITAL SIGNS: /78   Pulse 93   Ht 6' 2" (1.88 m)   Wt 94.3 kg (208 lb)   BMI 26.71 kg/m²      Review of Systems   Constitution: Negative. Negative for chills, fever and night sweats.   HENT: Negative for congestion and headaches.    Eyes: Negative for blurred vision, left vision loss and right vision loss.   Cardiovascular: Negative for chest pain and syncope.   Respiratory: Negative for cough and shortness of breath.    Endocrine: Negative for polydipsia, polyphagia and polyuria.   Hematologic/Lymphatic: Negative for bleeding problem. Does not bruise/bleed easily.   Skin: Negative for dry skin, itching and rash.   Musculoskeletal: Negative for falls and muscle weakness.   Gastrointestinal: Negative for abdominal pain and bowel incontinence.   Genitourinary: Negative for bladder incontinence and nocturia.   Neurological: Negative for disturbances in coordination, loss of balance and seizures.   Psychiatric/Behavioral: Negative for depression. The patient does not have insomnia.    Allergic/Immunologic: Negative for hives and persistent infections.       PHYSICAL EXAMINATION:  General:  The patient is alert and oriented x 3.  Mood is pleasant.  Observation of ears, eyes and nose reveal no gross abnormalities.  HEENT: NCAT, sclera nonicteric  Lungs: Respirations are equal and unlabored.  Gait is coordinated. Patient can toe walk and heel " walk without difficulty.  Cardiovascular: There are no swelling or varicosities present.   Lymphatic: Negative for adenopathy       left Shoulder / Upper Extremity Exam    OBSERVATION:     Swelling  none  Deformity  none   Discoloration  none   Scapular winging none   Scars   none  Atrophy  none    TENDERNESS / CREPITUS (T/C):          T/C      T/C   Clavicle   -/-  SUPRAspinatus    -/-   AC Jt.    -/-  INFRAspinatus  -/-   SC Jt.    -/-  Deltoid    -/-   G. Tuberosity  -/-  LH BICEP groove  +/-   Acromion:  -/-  Midline Neck   -/-   Scapular Spine -/-  Trapezium   -/-   SMA Scapula  -/-  GH jt. line - post  -/-   Scapulothoracic  -/-         ROM: (* = with pain)  Right shoulder   Left shoulder        AROM (PROM)   AROM (PROM)   FE    170° (175°)     155° (175°) Improved    ER at 0°    60°  (65°)    50°  (65°)   ER at 90° ABD  90°  (90°)    70°  (90°)   IR at 90°  ABD   NA  (40°)     NA  (40°)      IR (spine level)   T10     T12 (improved)    STRENGTH: (* = with pain) RIGHT SHOULDER  LEFT SHOULDER   SCAPTION at 0   5/5    5/5    SCAPTION at 30   5/5    5/5    IR    5/5    5/5   ER    5/5    5/5   BICEPS   5/5    5/5   Deltoid    5/5    5/5     SIGNS:  Painful side       NEER   +   ODAVIDS  +   HERNANDEZ   neg    SPEEDS  Neg   DROP ARM   neg   BELLY PRESS Neg   Superior escape none    LIFT-OFF  Neg   X-Body ADD    neg    MOVING VALGUS Neg      STABILITY TESTING    RIGHT SHOULDER   LEFT SHOULDER       Translation    Anterior  up face     up face    Posterior  up face    up face    Sulcus   < 10mm    < 10 mm    Signs    Apprehension   neg      Neg    Relocation   no change     no change    Jerk test  neg     Neg      EXTREMITY NEURO-VASCULAR EXAM    Sensation grossly intact to light touch all dermatomal regions.    DTR 2+ Biceps, Triceps, BR and Negative Ingriss sign   Grossly intact motor function at Elbow, Wrist and Hand   Distal pulses radial and ulnar 2+, brisk cap refill, symmetric.      NECK:  Painless  FROM and spinous processes non-tender. Negative Spurlings sign.      OTHER FINDINGS:    Negative Bear Hug test    XRAYS:  Shoulder trauma series left,  were ordered and reviewed by me. No convincing fracture or dislocation is noted. The osseous structures appear well mineralized and well aligned    MRI left shoulder from 6/15/18:    Postoperative changes of the supraspinatus and infraspinatus tendons with tendinosis.    High grade subscapularis tear.    Probable biceps tendon tear with medial subluxation and distal retraction.    Marked anterior humeral head subchondral edema and cystic change.         Assessment:  1. Shoulder pain, left   2. Left shoulder impingement    Plan:       1. MRI results discussed with patient.   2. Shoulder improved with 40mg steroid injection but it did cause BG elevation for 1 week to 596.    3. OTC pain control  4. Ice compresses prn  5. Patient refuses to do formal PT. Continue HEP to help shoulder pain.   6. Neck pain was improved with my shoulder injection.   7. RTC prn shoulder pain worsening    All patients questions were answered. Patient was advised to call us with any concerns or questions.

## 2018-08-03 ENCOUNTER — TELEPHONE (OUTPATIENT)
Dept: FAMILY MEDICINE | Facility: CLINIC | Age: 73
End: 2018-08-03

## 2018-09-10 RX ORDER — LOSARTAN POTASSIUM 25 MG/1
TABLET ORAL
Qty: 90 TABLET | Refills: 0 | Status: SHIPPED | OUTPATIENT
Start: 2018-09-10 | End: 2018-12-10 | Stop reason: SDUPTHER

## 2018-09-11 ENCOUNTER — TELEPHONE (OUTPATIENT)
Dept: INTERNAL MEDICINE | Facility: CLINIC | Age: 73
End: 2018-09-11

## 2018-09-11 RX ORDER — DILTIAZEM HYDROCHLORIDE 120 MG/1
CAPSULE, COATED, EXTENDED RELEASE ORAL
Qty: 30 CAPSULE | Refills: 0 | Status: SHIPPED | OUTPATIENT
Start: 2018-09-11 | End: 2018-10-08 | Stop reason: SDUPTHER

## 2018-09-11 RX ORDER — CELECOXIB 200 MG/1
CAPSULE ORAL
Qty: 30 CAPSULE | Refills: 0 | Status: SHIPPED | OUTPATIENT
Start: 2018-09-11 | End: 2018-10-08 | Stop reason: SDUPTHER

## 2018-09-11 NOTE — TELEPHONE ENCOUNTER
----- Message from Annita Arroyo sent at 9/11/2018  9:48 AM CDT -----  Contact: [phone]  Pt requesting to speak with you in regarding his mediations .  Please advise

## 2018-09-11 NOTE — TELEPHONE ENCOUNTER
----- Message from Willow Apolonia sent at 9/11/2018 12:14 PM CDT -----  Contact: self, [phone] (H)  Patient called in returning your call. Please advise.

## 2018-09-29 ENCOUNTER — NURSE TRIAGE (OUTPATIENT)
Dept: ADMINISTRATIVE | Facility: CLINIC | Age: 73
End: 2018-09-29

## 2018-09-29 NOTE — TELEPHONE ENCOUNTER
"[facility] states he still has rx avail    Reason for Disposition   Caller has medication question only, adult not sick, and triager answers question     Called to pharmacy . Patient has refills available    Answer Assessment - Initial Assessment Questions  1. SYMPTOMS: "Do you have any symptoms?"      preventive  2. SEVERITY: If symptoms are present, ask "Are they mild, moderate or severe?"      severe    Protocols used: ST MEDICATION QUESTION CALL-A-    Called to pharmacy . Patient has refills available  "

## 2018-10-07 DIAGNOSIS — N18.30 TYPE 2 DIABETES MELLITUS WITH STAGE 3 CHRONIC KIDNEY DISEASE, WITH LONG-TERM CURRENT USE OF INSULIN: ICD-10-CM

## 2018-10-07 DIAGNOSIS — E11.65 TYPE 2 DIABETES MELLITUS WITH HYPERGLYCEMIA, WITH LONG-TERM CURRENT USE OF INSULIN: ICD-10-CM

## 2018-10-07 DIAGNOSIS — Z79.4 TYPE 2 DIABETES MELLITUS WITH HYPERGLYCEMIA, WITH LONG-TERM CURRENT USE OF INSULIN: ICD-10-CM

## 2018-10-07 DIAGNOSIS — E11.22 TYPE 2 DIABETES MELLITUS WITH STAGE 3 CHRONIC KIDNEY DISEASE, WITH LONG-TERM CURRENT USE OF INSULIN: ICD-10-CM

## 2018-10-07 DIAGNOSIS — Z79.4 TYPE 2 DIABETES MELLITUS WITH STAGE 3 CHRONIC KIDNEY DISEASE, WITH LONG-TERM CURRENT USE OF INSULIN: ICD-10-CM

## 2018-10-08 RX ORDER — METFORMIN HYDROCHLORIDE 500 MG/1
500 TABLET ORAL 2 TIMES DAILY WITH MEALS
Qty: 60 TABLET | Refills: 0 | Status: SHIPPED | OUTPATIENT
Start: 2018-10-08 | End: 2018-11-19 | Stop reason: SDUPTHER

## 2018-10-08 RX ORDER — GABAPENTIN 300 MG/1
CAPSULE ORAL
Qty: 180 CAPSULE | Refills: 0 | Status: SHIPPED | OUTPATIENT
Start: 2018-10-08 | End: 2019-01-10 | Stop reason: SDUPTHER

## 2018-10-08 RX ORDER — CELECOXIB 200 MG/1
CAPSULE ORAL
Qty: 30 CAPSULE | Refills: 0 | Status: SHIPPED | OUTPATIENT
Start: 2018-10-08 | End: 2018-11-09 | Stop reason: SDUPTHER

## 2018-10-08 RX ORDER — DILTIAZEM HYDROCHLORIDE 120 MG/1
CAPSULE, COATED, EXTENDED RELEASE ORAL
Qty: 30 CAPSULE | Refills: 0 | Status: SHIPPED | OUTPATIENT
Start: 2018-10-08 | End: 2018-11-09 | Stop reason: SDUPTHER

## 2018-10-08 RX ORDER — PRAVASTATIN SODIUM 40 MG/1
TABLET ORAL
Qty: 90 TABLET | Refills: 0 | Status: SHIPPED | OUTPATIENT
Start: 2018-10-08 | End: 2019-01-10 | Stop reason: SDUPTHER

## 2018-10-08 NOTE — TELEPHONE ENCOUNTER
----- Message from Angy Yung sent at 10/8/2018  4:19 PM CDT -----  Contact: self / [phone]  Patient is requesting a refill on the below. Please advise        celecoxib (CELEBREX) 200 MG capsule    metFORMIN (GLUCOPHAGE) 500 MG tablet    diltiaZEM (CARTIA XT) 120 MG Cp24    Pharmacy     [facility] [address] [place] [address] [address] AT [facility] [address]

## 2018-11-05 LAB
LEFT EYE DM RETINOPATHY: NEGATIVE
RIGHT EYE DM RETINOPATHY: NEGATIVE

## 2018-11-09 RX ORDER — DILTIAZEM HYDROCHLORIDE 120 MG/1
CAPSULE, COATED, EXTENDED RELEASE ORAL
Qty: 30 CAPSULE | Refills: 0 | Status: SHIPPED | OUTPATIENT
Start: 2018-11-09 | End: 2018-11-26 | Stop reason: SDUPTHER

## 2018-11-09 RX ORDER — CELECOXIB 200 MG/1
CAPSULE ORAL
Qty: 30 CAPSULE | Refills: 0 | Status: SHIPPED | OUTPATIENT
Start: 2018-11-09 | End: 2018-11-26 | Stop reason: SDUPTHER

## 2018-11-09 NOTE — TELEPHONE ENCOUNTER
----- Message from Geraldo Orozco sent at 11/9/2018 12:49 PM CST -----  Contact: [phone]  Patient advised he and [employer] [facility] has been calling since Monday to get a refill on the diltiaZEM (CARTIA XT) 120 MG Cp24 and celecoxib (CELEBREX) 200 MG capsule and he is very upset. Please call.

## 2018-11-19 DIAGNOSIS — E11.65 TYPE 2 DIABETES MELLITUS WITH HYPERGLYCEMIA, WITH LONG-TERM CURRENT USE OF INSULIN: ICD-10-CM

## 2018-11-19 DIAGNOSIS — Z79.4 TYPE 2 DIABETES MELLITUS WITH HYPERGLYCEMIA, WITH LONG-TERM CURRENT USE OF INSULIN: ICD-10-CM

## 2018-11-19 DIAGNOSIS — N18.30 TYPE 2 DIABETES MELLITUS WITH STAGE 3 CHRONIC KIDNEY DISEASE, WITH LONG-TERM CURRENT USE OF INSULIN: ICD-10-CM

## 2018-11-19 DIAGNOSIS — Z79.4 TYPE 2 DIABETES MELLITUS WITH STAGE 3 CHRONIC KIDNEY DISEASE, WITH LONG-TERM CURRENT USE OF INSULIN: ICD-10-CM

## 2018-11-19 DIAGNOSIS — E11.22 TYPE 2 DIABETES MELLITUS WITH STAGE 3 CHRONIC KIDNEY DISEASE, WITH LONG-TERM CURRENT USE OF INSULIN: ICD-10-CM

## 2018-11-19 RX ORDER — METFORMIN HYDROCHLORIDE 500 MG/1
500 TABLET ORAL 2 TIMES DAILY WITH MEALS
Qty: 180 TABLET | Refills: 3 | Status: SHIPPED | OUTPATIENT
Start: 2018-11-19 | End: 2019-11-12 | Stop reason: SDUPTHER

## 2018-11-19 NOTE — TELEPHONE ENCOUNTER
----- Message from Hyun Treviño sent at 11/19/2018 10:01 AM CST -----  Contact: [phone]/self  Patient would like to speak with you concerning metFORMIN (GLUCOPHAGE) 500 MG tablet  States hes been waiting on medication to be sent over for a refill. Please call [phone] and advise.

## 2018-11-21 ENCOUNTER — TELEPHONE (OUTPATIENT)
Dept: ADMINISTRATIVE | Facility: HOSPITAL | Age: 73
End: 2018-11-21

## 2018-11-26 ENCOUNTER — OFFICE VISIT (OUTPATIENT)
Dept: FAMILY MEDICINE | Facility: CLINIC | Age: 73
End: 2018-11-26
Payer: MEDICARE

## 2018-11-26 ENCOUNTER — TELEPHONE (OUTPATIENT)
Dept: ADMINISTRATIVE | Facility: HOSPITAL | Age: 73
End: 2018-11-26

## 2018-11-26 VITALS
WEIGHT: 209.44 LBS | DIASTOLIC BLOOD PRESSURE: 60 MMHG | BODY MASS INDEX: 26.88 KG/M2 | HEART RATE: 88 BPM | HEIGHT: 74 IN | SYSTOLIC BLOOD PRESSURE: 120 MMHG

## 2018-11-26 DIAGNOSIS — E11.65 TYPE 2 DIABETES MELLITUS WITH HYPERGLYCEMIA, WITH LONG-TERM CURRENT USE OF INSULIN: ICD-10-CM

## 2018-11-26 DIAGNOSIS — R06.02 SOBOE (SHORTNESS OF BREATH ON EXERTION): ICD-10-CM

## 2018-11-26 DIAGNOSIS — Z23 NEED FOR INFLUENZA VACCINATION: ICD-10-CM

## 2018-11-26 DIAGNOSIS — Z79.4 TYPE 2 DIABETES MELLITUS WITH HYPERGLYCEMIA, WITH LONG-TERM CURRENT USE OF INSULIN: ICD-10-CM

## 2018-11-26 DIAGNOSIS — E13.40 NEUROPATHY DUE TO SECONDARY DIABETES: ICD-10-CM

## 2018-11-26 DIAGNOSIS — Z12.11 SCREEN FOR COLON CANCER: ICD-10-CM

## 2018-11-26 DIAGNOSIS — M15.9 PRIMARY OSTEOARTHRITIS INVOLVING MULTIPLE JOINTS: ICD-10-CM

## 2018-11-26 DIAGNOSIS — I10 ESSENTIAL HYPERTENSION: Primary | ICD-10-CM

## 2018-11-26 PROCEDURE — G0008 ADMIN INFLUENZA VIRUS VAC: HCPCS | Mod: HCNC,S$GLB,, | Performed by: FAMILY MEDICINE

## 2018-11-26 PROCEDURE — 99999 PR PBB SHADOW E&M-EST. PATIENT-LVL IV: CPT | Mod: PBBFAC,HCNC,, | Performed by: FAMILY MEDICINE

## 2018-11-26 PROCEDURE — 3046F HEMOGLOBIN A1C LEVEL >9.0%: CPT | Mod: CPTII,HCNC,S$GLB, | Performed by: FAMILY MEDICINE

## 2018-11-26 PROCEDURE — 90662 IIV NO PRSV INCREASED AG IM: CPT | Mod: HCNC,S$GLB,, | Performed by: FAMILY MEDICINE

## 2018-11-26 PROCEDURE — 1101F PT FALLS ASSESS-DOCD LE1/YR: CPT | Mod: CPTII,HCNC,S$GLB, | Performed by: FAMILY MEDICINE

## 2018-11-26 PROCEDURE — 3074F SYST BP LT 130 MM HG: CPT | Mod: CPTII,HCNC,S$GLB, | Performed by: FAMILY MEDICINE

## 2018-11-26 PROCEDURE — 99214 OFFICE O/P EST MOD 30 MIN: CPT | Mod: 25,HCNC,S$GLB, | Performed by: FAMILY MEDICINE

## 2018-11-26 PROCEDURE — 3078F DIAST BP <80 MM HG: CPT | Mod: CPTII,HCNC,S$GLB, | Performed by: FAMILY MEDICINE

## 2018-11-26 RX ORDER — CELECOXIB 200 MG/1
200 CAPSULE ORAL DAILY PRN
Qty: 30 CAPSULE | Refills: 11 | Status: SHIPPED | OUTPATIENT
Start: 2018-11-26 | End: 2019-12-09 | Stop reason: SDUPTHER

## 2018-11-26 RX ORDER — DILTIAZEM HYDROCHLORIDE 120 MG/1
CAPSULE, COATED, EXTENDED RELEASE ORAL
Qty: 30 CAPSULE | Refills: 11 | Status: SHIPPED | OUTPATIENT
Start: 2018-11-26 | End: 2019-11-12 | Stop reason: SDUPTHER

## 2018-11-26 NOTE — PROGRESS NOTES
Subjective:       Patient ID: Kamar Muñoz is a 73 y.o. male.    Chief Complaint: Annual Exam    73 years old male came to the clinic for diabetes check.  Last A1c was elevated.  Patient with good compliance with insulin regimen .  Patient reports sometimes hypoglycemic episodes.  Blood pressure today stable .  Patient with stable neuropathy using gabapentin.  Patient reports episodic shortness of breath especially with exercise.  No chest pain, palpitation, orthopnea or PND.  Last 2D echo was normal .  Patient request Celebrex for for multiple joints arthritis .      Review of Systems   Constitutional: Negative.    HENT: Negative.    Eyes: Negative.    Respiratory: Positive for shortness of breath.    Cardiovascular: Negative.  Negative for chest pain, palpitations and leg swelling.   Gastrointestinal: Negative.    Endocrine: Negative for polydipsia, polyphagia and polyuria.   Genitourinary: Negative.    Musculoskeletal: Negative.    Skin: Negative.    Neurological: Negative.    Psychiatric/Behavioral: Negative.        Objective:      Physical Exam   Constitutional: He is oriented to person, place, and time. He appears well-developed and well-nourished. No distress.   HENT:   Head: Normocephalic and atraumatic.   Right Ear: External ear normal.   Left Ear: External ear normal.   Nose: Nose normal.   Mouth/Throat: Oropharynx is clear and moist. No oropharyngeal exudate.   Eyes: Conjunctivae and EOM are normal. Pupils are equal, round, and reactive to light. Right eye exhibits no discharge. Left eye exhibits no discharge. No scleral icterus.   Neck: Normal range of motion. Neck supple. No JVD present. No tracheal deviation present. No thyromegaly present.   Cardiovascular: Normal rate, regular rhythm, normal heart sounds and intact distal pulses. Exam reveals no gallop and no friction rub.   No murmur heard.  Pulmonary/Chest: Effort normal and breath sounds normal. No stridor. No respiratory distress. He has no  wheezes. He has no rales. He exhibits no tenderness.   Abdominal: Soft. Bowel sounds are normal. He exhibits no distension and no mass. There is no tenderness. There is no rebound and no guarding.   Musculoskeletal: Normal range of motion. He exhibits no edema or tenderness.   Lymphadenopathy:     He has no cervical adenopathy.   Neurological: He is alert and oriented to person, place, and time. He has normal reflexes. He displays normal reflexes. No cranial nerve deficit. He exhibits normal muscle tone. Coordination normal.   Skin: Skin is warm and dry. No rash noted. He is not diaphoretic. No erythema. No pallor.   Psychiatric: He has a normal mood and affect. His behavior is normal. Judgment and thought content normal.   Nursing note and vitals reviewed.      Assessment:       1. Essential hypertension    2. Screen for colon cancer    3. Type 2 diabetes mellitus with hyperglycemia, with long-term current use of insulin    4. SOBOE (shortness of breath on exertion)    5. Need for influenza vaccination    6. Primary osteoarthritis involving multiple joints    7. 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    Diagnoses and all orders for this visit:    Essential hypertension  -     TSH; Future  -     Comprehensive metabolic panel; Future  -     Lipid panel; Future  -     CBC auto differential; Future  -     diltiaZEM (CARTIA XT) 120 MG Cp24; TAKE 1 CAPSULE(120 MG) BY MOUTH EVERY DAY    Screen for colon cancer  -     Case request GI: COLONOSCOPY    Type 2 diabetes mellitus with hyperglycemia, with long-term current use of insulin  -     Microalbumin/creatinine urine ratio; Future    SOBOE (shortness of breath on exertion)  -     X-Ray Chest PA And Lateral; Future  -     Brain natriuretic peptide; Future  -     Ambulatory referral to Cardiology    Need for influenza vaccination  -     Influenza - High Dose (65+) (PF) (IM)    Primary osteoarthritis involving multiple  joints  -     celecoxib (CELEBREX) 200 MG capsule; Take 1 capsule (200 mg total) by mouth daily as needed for Pain. 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  Continue monitoring blood sugar at home,ADA diet.  Continue with gabapentin.

## 2018-11-27 ENCOUNTER — TELEPHONE (OUTPATIENT)
Dept: FAMILY MEDICINE | Facility: CLINIC | Age: 73
End: 2018-11-27

## 2018-11-27 ENCOUNTER — TELEPHONE (OUTPATIENT)
Dept: GASTROENTEROLOGY | Facility: CLINIC | Age: 73
End: 2018-11-27

## 2018-11-27 ENCOUNTER — HOSPITAL ENCOUNTER (OUTPATIENT)
Dept: RADIOLOGY | Facility: HOSPITAL | Age: 73
Discharge: HOME | End: 2018-11-27
Attending: FAMILY MEDICINE
Payer: MEDICARE

## 2018-11-27 DIAGNOSIS — R06.02 SOBOE (SHORTNESS OF BREATH ON EXERTION): ICD-10-CM

## 2018-11-27 DIAGNOSIS — R06.02 SOB (SHORTNESS OF BREATH) ON EXERTION: Primary | ICD-10-CM

## 2018-11-27 DIAGNOSIS — J43.9 PULMONARY EMPHYSEMA, UNSPECIFIED EMPHYSEMA TYPE: ICD-10-CM

## 2018-11-27 PROCEDURE — 71046 X-RAY EXAM CHEST 2 VIEWS: CPT | Mod: 26,HCNC,, | Performed by: RADIOLOGY

## 2018-11-27 PROCEDURE — 71046 X-RAY EXAM CHEST 2 VIEWS: CPT | Mod: TC,FY,HCNC,PO

## 2018-11-27 NOTE — TELEPHONE ENCOUNTER
Spoke with patient about scheduling a Colonoscopy. Staff informed patient that he has to make an office visit first before scheduling his procedure. Patient scheduled an appointment with Dorcas Jiménez on 12/28/2018.

## 2018-11-28 ENCOUNTER — TELEPHONE (OUTPATIENT)
Dept: PULMONOLOGY | Facility: CLINIC | Age: 73
End: 2018-11-28

## 2018-11-28 ENCOUNTER — IMMUNIZATION (OUTPATIENT)
Dept: PHARMACY | Facility: CLINIC | Age: 73
End: 2018-11-28
Payer: MEDICARE

## 2018-11-28 NOTE — TELEPHONE ENCOUNTER
----- Message from Oliva Segal sent at 11/28/2018  9:02 AM CST -----  Contact: self/ [phone]  Patient has a referral for SOB (shortness of breath) on exertion Pulmonary emphysema, unspecified emphysema type. Please advise.       2nd message

## 2018-11-28 NOTE — TELEPHONE ENCOUNTER
Informed the patient that as of now our providers currently does not have any appointments available. Also asked the patient if he would like for me to place him on the wait list, patient accepted the offer. Patient will be placed on the wait list.

## 2018-12-04 ENCOUNTER — INITIAL CONSULT (OUTPATIENT)
Dept: CARDIOLOGY | Facility: CLINIC | Age: 73
End: 2018-12-04
Payer: MEDICARE

## 2018-12-04 ENCOUNTER — HOSPITAL ENCOUNTER (OUTPATIENT)
Dept: CARDIOLOGY | Facility: CLINIC | Age: 73
Discharge: HOME | End: 2018-12-04
Payer: MEDICARE

## 2018-12-04 VITALS
HEIGHT: 74 IN | SYSTOLIC BLOOD PRESSURE: 130 MMHG | WEIGHT: 207.88 LBS | OXYGEN SATURATION: 96 % | BODY MASS INDEX: 26.68 KG/M2 | DIASTOLIC BLOOD PRESSURE: 80 MMHG | HEART RATE: 88 BPM

## 2018-12-04 DIAGNOSIS — R06.09 DOE (DYSPNEA ON EXERTION): Primary | ICD-10-CM

## 2018-12-04 DIAGNOSIS — R06.09 DOE (DYSPNEA ON EXERTION): ICD-10-CM

## 2018-12-04 PROCEDURE — 3075F SYST BP GE 130 - 139MM HG: CPT | Mod: CPTII,HCNC,S$GLB, | Performed by: INTERNAL MEDICINE

## 2018-12-04 PROCEDURE — 99203 OFFICE O/P NEW LOW 30 MIN: CPT | Mod: HCNC,S$GLB,, | Performed by: INTERNAL MEDICINE

## 2018-12-04 PROCEDURE — 93000 ELECTROCARDIOGRAM COMPLETE: CPT | Mod: HCNC,S$GLB,, | Performed by: INTERNAL MEDICINE

## 2018-12-04 PROCEDURE — 99999 PR PBB SHADOW E&M-EST. PATIENT-LVL IV: CPT | Mod: PBBFAC,HCNC,, | Performed by: INTERNAL MEDICINE

## 2018-12-04 PROCEDURE — 1101F PT FALLS ASSESS-DOCD LE1/YR: CPT | Mod: CPTII,HCNC,S$GLB, | Performed by: INTERNAL MEDICINE

## 2018-12-04 PROCEDURE — 3079F DIAST BP 80-89 MM HG: CPT | Mod: CPTII,HCNC,S$GLB, | Performed by: INTERNAL MEDICINE

## 2018-12-04 NOTE — LETTER
December 4, 2018      Basim Guerrero MD  [address]  [place] [address]           [facility]  [address]  [facility] [address]  Phone: [phone]          Patient: Kamar Muñoz   MR Number: [id number]   YOB: 1945   Date of Visit: 12/4/2018       Dear Dr. Basim Guerrero:    Thank you for referring Kamar Muñoz to me for evaluation. Attached you will find relevant portions of my assessment and plan of care.    If you have questions, please do not hesitate to call me. I look forward to following Kamar Muñoz along with you.    Sincerely,    Shahriar Powell MD    Enclosure  CC:  No Recipients    If you would like to receive this communication electronically, please contact [email or web address] or [phone] to request more information on [employer] Link access.    For providers and/or their staff who would like to refer a patient to [facility], please contact us through our one-stop-shop provider referral line, [facility], at [phone].    If you feel you have received this communication in error or would no longer like to receive these types of communications, please e-mail [email or web address]

## 2018-12-04 NOTE — PROGRESS NOTES
Subjective:    Patient ID:  Kamar Muñoz is a 73 y.o. male who presents for evaluation of Shortness of Breath      HPI  For approximately 2 months he has had reproducible shortness of breath when walking at a brisk pace and         walking a few blocks. He denies chest pain or palpitations.    Review of Systems   Constitution: Negative for chills, decreased appetite and fever.   Eyes: Negative for blurred vision and double vision.   Cardiovascular: Positive for dyspnea on exertion. Negative for chest pain, claudication, cyanosis, irregular heartbeat, leg swelling, palpitations and paroxysmal nocturnal dyspnea.   Respiratory: Positive for shortness of breath. Negative for cough, sleep disturbances due to breathing and wheezing.    Musculoskeletal: Negative for back pain.   Gastrointestinal: Negative for abdominal pain.   Neurological: Negative for brief paralysis.   Psychiatric/Behavioral: Negative for altered mental status and depression.        Objective:    Physical Exam   Constitutional: He is oriented to person, place, and time. He appears well-developed and well-nourished. No distress.   Neck: Normal range of motion. Neck supple.   Cardiovascular: Normal rate, regular rhythm, normal heart sounds and intact distal pulses.   No murmur heard.  Pulmonary/Chest: Effort normal and breath sounds normal. No respiratory distress. He has no wheezes. He has no rales. He exhibits no tenderness.   Abdominal: Soft.   Musculoskeletal: He exhibits no edema.   Neurological: He is alert and oriented to person, place, and time.   Skin: Skin is warm and dry. He is not diaphoretic.   Psychiatric: He has a normal mood and affect. His behavior is normal. Judgment and thought content normal.         Assessment:       1. PETTY (dyspnea on exertion)         Plan:     1.  EKG, stress echo.    2.  Follow up after above.

## 2018-12-06 ENCOUNTER — CLINICAL SUPPORT (OUTPATIENT)
Dept: CARDIOLOGY | Facility: CLINIC | Age: 73
End: 2018-12-06
Attending: INTERNAL MEDICINE
Payer: MEDICARE

## 2018-12-06 VITALS — WEIGHT: 207 LBS | HEIGHT: 74 IN | BODY MASS INDEX: 26.56 KG/M2

## 2018-12-06 DIAGNOSIS — R06.09 DOE (DYSPNEA ON EXERTION): ICD-10-CM

## 2018-12-06 LAB
ASCENDING AORTA: 3.33 CM
AV INDEX (PROSTH): 0.82
AV MEAN GRADIENT: 3.32 MMHG
AV PEAK GRADIENT: 5.2 MMHG
AV VALVE AREA: 3.66 CM2
BSA FOR ECHO PROCEDURE: 2.21 M2
CV ECHO LV RWT: 0.37 CM
CV STRESS BASE HR: 83
DIASTOLIC BLOOD PRESSURE: 85
DOP CALC AO PEAK VEL: 1.14 M/S
DOP CALC AO VTI: 25.85 CM
DOP CALC LVOT AREA: 4.45 CM2
DOP CALC LVOT DIAMETER: 2.38 CM
DOP CALC LVOT STROKE VOLUME: 94.62 CM3
DOP CALCLVOT PEAK VEL VTI: 21.28 CM
E WAVE DECELERATION TIME: 211.15 MSEC
E/A RATIO: 0.74
E/E' RATIO: 10.15
ECHO LV POSTERIOR WALL: 0.95 CM (ref 0.6–1.1)
FRACTIONAL SHORTENING: 26 % (ref 28–44)
INTERVENTRICULAR SEPTUM: 0.95 CM (ref 0.6–1.1)
IVRT: 0.1 MSEC
LA MAJOR: 5.1 CM
LA MINOR: 5.13 CM
LA WIDTH: 4.21 CM
LEFT ATRIUM SIZE: 4.2 CM
LEFT ATRIUM VOLUME INDEX: 34.9 ML/M2
LEFT ATRIUM VOLUME: 76.88 CM3
LEFT INTERNAL DIMENSION IN SYSTOLE: 3.85 CM (ref 2.1–4)
LEFT VENTRICLE DIASTOLIC VOLUME INDEX: 58.8 ML/M2
LEFT VENTRICLE DIASTOLIC VOLUME: 129.66 ML
LEFT VENTRICLE MASS INDEX: 82.3 G/M2
LEFT VENTRICLE SYSTOLIC VOLUME INDEX: 29 ML/M2
LEFT VENTRICLE SYSTOLIC VOLUME: 63.86 ML
LEFT VENTRICULAR INTERNAL DIMENSION IN DIASTOLE: 5.2 CM (ref 3.5–6)
LEFT VENTRICULAR MASS: 181.4 G
LV LATERAL E/E' RATIO: 8.25
LV SEPTAL E/E' RATIO: 13.2
MV PEAK A VEL: 0.89 M/S
MV PEAK E VEL: 0.66 M/S
OHS CV CPX 1 MINUTE RECOVERY HEART RATE: 91 BPM
OHS CV CPX 85 PERCENT MAX PREDICTED HEART RATE MALE: 123
OHS CV CPX ESTIMATED METS: 6
OHS CV CPX MAX PREDICTED HEART RATE: 145
OHS CV CPX PATIENT IS FEMALE: 0
OHS CV CPX PATIENT IS MALE: 1
OHS CV CPX PEAK DIASTOLIC BLOOD PRESSURE: 85 MMHG
OHS CV CPX PEAK HEAR RATE: 109
OHS CV CPX PEAK RATE PRESSURE PRODUCT: NORMAL
OHS CV CPX PEAK SYSTOLIC BLOOD PRESSURE: 175
OHS CV CPX PERCENT MAX PREDICTED HEART RATE ACHIEVED: 75
OHS CV CPX PERCENT TARGET HEART RATE ACHIEVED: 88.62
OHS CV CPX RATE PRESSURE PRODUCT PRESENTING: NORMAL
OHS CV CPX TARGET HEART RATE: 123
PISA TR MAX VEL: 2.31 M/S
PULM VEIN S/D RATIO: 1.61
PV PEAK D VEL: 0.36 M/S
PV PEAK S VEL: 0.58 M/S
RA MAJOR: 4.91 CM
RA PRESSURE: 3 MMHG
RA WIDTH: 3.4 CM
RIGHT VENTRICULAR END-DIASTOLIC DIMENSION: 4.28 CM
SINUS: 3.36 CM
STJ: 2.96 CM
STRESS ECHO POST EXERCISE DUR MIN: 8 MIN
STRESS ECHO POST EXERCISE DUR SEC: 26
SYSTOLIC BLOOD PRESSURE: 148
TDI LATERAL: 0.08
TDI SEPTAL: 0.05
TDI: 0.07
TR MAX PG: 21.34 MMHG
TRICUSPID ANNULAR PLANE SYSTOLIC EXCURSION: 2.39 CM
TV REST PULMONARY ARTERY PRESSURE: 24.34 MMHG

## 2018-12-06 PROCEDURE — 93351 STRESS TTE COMPLETE: CPT | Mod: HCNC,S$GLB,, | Performed by: INTERNAL MEDICINE

## 2018-12-06 PROCEDURE — 99999 PR PBB SHADOW E&M-EST. PATIENT-LVL I: CPT | Mod: PBBFAC,HCNC,,

## 2018-12-10 RX ORDER — LOSARTAN POTASSIUM 25 MG/1
TABLET ORAL
Qty: 90 TABLET | Refills: 0 | Status: SHIPPED | OUTPATIENT
Start: 2018-12-10 | End: 2019-03-10 | Stop reason: SDUPTHER

## 2018-12-12 ENCOUNTER — OFFICE VISIT (OUTPATIENT)
Dept: PULMONOLOGY | Facility: CLINIC | Age: 73
End: 2018-12-12
Payer: MEDICARE

## 2018-12-12 ENCOUNTER — HOSPITAL ENCOUNTER (OUTPATIENT)
Dept: PULMONOLOGY | Facility: CLINIC | Age: 73
Discharge: HOME | End: 2018-12-12
Payer: MEDICARE

## 2018-12-12 ENCOUNTER — TELEPHONE (OUTPATIENT)
Dept: PULMONOLOGY | Facility: CLINIC | Age: 73
End: 2018-12-12

## 2018-12-12 VITALS
SYSTOLIC BLOOD PRESSURE: 120 MMHG | WEIGHT: 206.81 LBS | DIASTOLIC BLOOD PRESSURE: 72 MMHG | HEART RATE: 80 BPM | OXYGEN SATURATION: 95 % | HEIGHT: 74 IN | BODY MASS INDEX: 26.54 KG/M2

## 2018-12-12 DIAGNOSIS — J43.2 CENTRILOBULAR EMPHYSEMA: ICD-10-CM

## 2018-12-12 DIAGNOSIS — R05.9 COUGH: ICD-10-CM

## 2018-12-12 DIAGNOSIS — R09.81 NASAL CONGESTION: ICD-10-CM

## 2018-12-12 DIAGNOSIS — R05.9 COUGH: Primary | ICD-10-CM

## 2018-12-12 LAB
PRE FEV1 FVC: 65
PRE FEV1: 2.54
PRE FVC: 3.92
PREDICTED FEV1 FVC: 77
PREDICTED FEV1: 3.47
PREDICTED FVC: 4.4

## 2018-12-12 PROCEDURE — 99999 PR PBB SHADOW E&M-EST. PATIENT-LVL III: CPT | Mod: PBBFAC,HCNC,, | Performed by: INTERNAL MEDICINE

## 2018-12-12 PROCEDURE — 99499 UNLISTED E&M SERVICE: CPT | Mod: S$GLB,,, | Performed by: FAMILY MEDICINE

## 2018-12-12 PROCEDURE — 1101F PT FALLS ASSESS-DOCD LE1/YR: CPT | Mod: CPTII,HCNC,S$GLB, | Performed by: INTERNAL MEDICINE

## 2018-12-12 PROCEDURE — 3078F DIAST BP <80 MM HG: CPT | Mod: CPTII,HCNC,S$GLB, | Performed by: INTERNAL MEDICINE

## 2018-12-12 PROCEDURE — 99204 OFFICE O/P NEW MOD 45 MIN: CPT | Mod: HCNC,S$GLB,, | Performed by: INTERNAL MEDICINE

## 2018-12-12 PROCEDURE — 94729 DIFFUSING CAPACITY: CPT | Mod: HCNC,S$GLB,, | Performed by: INTERNAL MEDICINE

## 2018-12-12 PROCEDURE — 94010 BREATHING CAPACITY TEST: CPT | Mod: HCNC,S$GLB,, | Performed by: INTERNAL MEDICINE

## 2018-12-12 PROCEDURE — 3074F SYST BP LT 130 MM HG: CPT | Mod: CPTII,HCNC,S$GLB, | Performed by: INTERNAL MEDICINE

## 2018-12-12 PROCEDURE — 94727 GAS DIL/WSHOT DETER LNG VOL: CPT | Mod: HCNC,S$GLB,, | Performed by: INTERNAL MEDICINE

## 2018-12-12 RX ORDER — ALBUTEROL SULFATE 90 UG/1
2 AEROSOL, METERED RESPIRATORY (INHALATION) EVERY 6 HOURS PRN
Qty: 18 G | Refills: 0 | Status: SHIPPED | OUTPATIENT
Start: 2018-12-12 | End: 2019-05-27

## 2018-12-12 NOTE — LETTER
December 12, 2018      Basim Guerrero MD  [address] [place] [address]           [facility] [address] [facility]  [address]  [place] [address]  Phone: [phone]          Patient: Kamar Muñoz   MR Number: [id number]   YOB: 1945   Date of Visit: 12/12/2018       Dear Dr. Basim Guerrero:    Thank you for referring Kamar Muñoz to me for evaluation. Attached you will find relevant portions of my assessment and plan of care.    If you have questions, please do not hesitate to call me. I look forward to following Kamar Muñoz along with you.    Sincerely,    Balaji Kaplan MD    Enclosure  CC:  No Recipients    If you would like to receive this communication electronically, please contact [email or web address] or [phone] to request more information on [employer] Link access.    For providers and/or their staff who would like to refer a patient to [facility], please contact us through our one-stop-shop provider referral line, [facility], at [phone].    If you feel you have received this communication in error or would no longer like to receive these types of communications, please e-mail [email or web address]

## 2018-12-12 NOTE — PROGRESS NOTES
Kamar Muñoz  was seen as a new patient at the request  Basim Guerrero for the evaluation of  copd.    CHIEF COMPLAINT:  Shortness of Breath and Emphysema      HISTORY OF PRESENT ILLNESS: Kamar Muñoz is a 73 y.o. 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  Patient smoked 1.5 ppd ~25 years.  Patient quit smoking 1980s.  Still active at work.  Walk several miles per day without issue.  Still do stationary bike 5-10 miles per day.  +daily cough that's nonproductive.  Cough worse with supine position.  +nasal congestion.  No gerd symptoms.  Occasional chest tightness that is not associated exertion.  No fever/chill.  No weight loss.  No hemoptysis.      PAST MEDICAL HISTORY:    Active Ambulatory Problems     Diagnosis Date Noted    Obesity 07/20/2012    Hypertension 07/20/2012    Proteinuria 07/20/2012    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 08/02/2012    Uncontrolled diabetes mellitus with complications 03/08/2013    Personal history of unspecified digestive disease 10/03/2013    Renal stone 08/30/2016    Flank pain 08/30/2016    Cervical radiculopathy 02/26/2018    Osteoarthritis of cervical spine 02/26/2018    Arthropathy of cervical facet joint 02/26/2018    Cervical facet joint syndrome 04/02/2018    Centrilobular emphysema 12/12/2018    Nasal congestion 12/12/2018    Cough      Resolved Ambulatory Problems     Diagnosis Date Noted    Type 2 diabetes mellitus 07/20/2012    Acute renal failure 08/30/2016     Past Medical History:   Diagnosis Date    Arthritis     Coronary artery disease     Diabetes mellitus type II     Hyperlipidemia     Hypertension     Kidney stone     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 2012    Type II or unspecified type diabetes mellitus with neurological manifestations, uncontrolled(250.62) 3/8/2013    Urinary tract infection                 PAST SURGICAL HISTORY:    Past Surgical History:   Procedure Laterality Date    BACK SURGERY      COLONOSCOPY N/A 10/3/2013    Performed [facility] ENDO (4TH FLR)    EXTRACTION-STONE-URETEROSCOPY Left 2016    [facility] OR    EYE SURGERY      HERNIA REPAIR      INJECTION-STEROID-EPIDURAL-CERVICAL- C7-T1 N/A 3/7/2018    Performed by German Palma MD at [facility] PAIN MGT    renal stones      SHOULDER OPEN ROTATOR CUFF REPAIR           FAMILY HISTORY:                Family History   Problem Relation Age of Onset    Prostate cancer Neg Hx     Kidney disease Neg Hx        SOCIAL HISTORY:          Tobacco:   Social History     Tobacco Use   Smoking Status Former Smoker    Packs/day: 1.50    Years: 25.00    Pack years: 37.50    Last attempt to quit: 1983    Years since quittin.9   Smokeless Tobacco Never Used     alcohol use:    Social History     Substance and Sexual Activity   Alcohol Use No               Occupation:  [employer]    ALLERGIES:    Review of patient's allergies indicates:   Allergen Reactions    Iodine      Other reaction(s): swelling  Other reaction(s): Itching  Other reaction(s): Rash       CURRENT MEDICATIONS:    Current Outpatient Medications   Medication Sig Dispense Refill    aspirin (ECOTRIN) 81 MG EC tablet Take 81 mg by mouth once daily.      celecoxib (CELEBREX) 200 MG capsule Take 1 capsule (200 mg total) by mouth daily as needed for Pain. TAKE 1 CAPSULE(200 MG) BY MOUTH EVERY DAY 30 capsule 11    diltiaZEM (CARTIA XT) 120 MG Cp24 TAKE 1 CAPSULE(120 MG) BY MOUTH EVERY DAY 30 capsule 11    ergocalciferol (ERGOCALCIFEROL) 50,000 unit Cap       gabapentin (NEURONTIN) 300 MG capsule TAKE 1 CAPSULE BY MOUTH TWICE DAILY 180 capsule  "0    insulin aspart U-100 (NOVOLOG FLEXPEN U-100 INSULIN) 100 unit/mL InPn pen Inject 15 Units into the skin 3 (three) times daily with meals. 13.5 mL 11    insulin glargine (LANTUS) 100 unit/mL injection Inject 35 Units into the skin every evening. (Patient taking differently: Inject 24 Units into the skin every evening. ) 10.5 mL 11    insulin syringe-needle U-100 (INSULIN SYRINGE MICROFINE) 0.3 mL 28 gauge x 1/2" Syrg Use with Lantus 100 each 6    losartan (COZAAR) 25 MG tablet TAKE 1 TABLET BY MOUTH DAILY 90 tablet 0    metFORMIN (GLUCOPHAGE) 500 MG tablet Take 1 tablet (500 mg total) by mouth 2 (two) times daily with meals. 180 tablet 3    MULTIVITAMIN ORAL Take 1 tablet by mouth once daily.       OMEPRAZOLE (PRILOSEC ORAL) Take 1 tablet by mouth daily as needed.       pen needle, diabetic (PEN NEEDLE) 30 gauge x 5/16" Ndle 1 Units by Misc.(Non-Drug; Combo Route) route 3 (three) times daily. 100 each 3    polycarbophil (FIBERCON) 625 mg tablet Take 1 tablet by mouth once daily.       pravastatin (PRAVACHOL) 40 MG tablet TAKE 1 TABLET BY MOUTH EVERY DAY 90 tablet 0    albuterol (PROVENTIL/VENTOLIN HFA) 90 mcg/actuation inhaler Inhale 2 puffs into the lungs every 6 (six) hours as needed for Wheezing. 18 g 0    levocetirizine (XYZAL) 5 MG tablet Take 1 tablet (5 mg total) by mouth every evening. 30 tablet 0     No current facility-administered medications for this visit.                   REVIEW OF SYSTEMS:     Pulmonary related symptoms as per HPI.  Gen:  no weight loss, no fever, occasional night sweat  HEENT:  no visual changes, no sore throat, + hearing loss  CV:  Per hpi  GI:  no melena, no hematochezia, no diarhea, no constipation.  :  no dysuria, no hematuria, no hesistancy, no dribbling  Neuro:  no syncope, no vertigo, no tinitus  Psych:  No homocide or suicide ideation; no depression.  Endocrine:  No heat or cold intolerance.  Sleep:  No snoring; no witnessed apnea.  Feeling rested upon " "awake.    Otherwise, a balance of systems reviewed is negative.          PHYSICAL EXAM:  Vitals:    12/12/18 1042   BP: 120/72   Pulse: 80   SpO2: 95%   Weight: 93.8 kg (206 lb 12.7 oz)   Height: 6' 2" (1.88 m)   PainSc: 0-No pain     Body mass index is 26.55 kg/m².     GENERAL:  well develop; no apparent distress  HEENT:  no nasal congestion; no discharge noted; class 4 modified mallampatti.   NECK:  supple; no palpable masses.  CARDIO: regular rate and rhythm  PULM:  clear to auscultation bilaterally; no intercostals retractions; no accessory muscle usage   ABDOMEN:  soft nontender/nondistended.  +bowel sound  EXTREMITIES no cce  NEURO:  CN II-XII intact.  5/5 motor in all extremities.  sensation grossly intact   to light touch.  PSYCH:  normal affect.  Alert and oriented x 4    LABS  Pulmonary Functions Testing Results: none  ABG none  CXR:  11/26/18 hyperinflated.  No consolidation or effusion  CT CHEST:  none    12/6/18 ETT  · Normal left ventricular systolic function. The estimated ejection fraction is 55%  · No wall motion abnormalities.  · Indeterminate left ventricular diastolic function.  · Trace mitral regurgitation.  · Normal right ventricular systolic function.  · Trace tricuspid regurgitation.  · The estimated PA systolic pressure is 24.34 mm Hg  · Normal central venous pressure (3 mm Hg).  · Mild left atrial enlargement.  · The stress echo portion of this study is negative for myocardial ischemia; however, there was failure to reach target heart rate (achieved 75% of max predicted heart rate).  · The EKG portion of this study is negative for myocardial ischemia.  · Arrhythmias during stress: occasional PVCs.  · The patient reported no symptoms during the stress test.  · Overall, the patient's exercise capacity was above average.      ASSESSMENT/PLAN  Problem List Items Addressed This Visit     Centrilobular emphysema    Overview     Hyperinflated cxr.  Emphysematous changes on lung cuts on ct abd.  " Clinically asymptomatic aside from cough.  Baseline pft.  Prn albuterol.           Relevant Medications    albuterol (PROVENTIL/VENTOLIN HFA) 90 mcg/actuation inhaler    Other Relevant Orders    X-Ray Chest PA And Lateral    Cough    Overview     Intermittent cough throughout the day.  Not interfering with quality of life.  May related to nasal congesion.  Optimize nasal congestion and reassess.           Nasal congestion    Overview     S/p ent evaluation in the past.  ?septal deviation.  Sinus irrigation and nasal steroid.                     Patient will Follow-up if symptoms worsen or fail to improve. with md/np.    CC: Send copy of this note to Basim Guerrero*

## 2018-12-14 DIAGNOSIS — R94.39 EQUIVOCAL STRESS TEST: ICD-10-CM

## 2018-12-14 DIAGNOSIS — R06.02 SOB (SHORTNESS OF BREATH): Primary | ICD-10-CM

## 2018-12-21 ENCOUNTER — TELEPHONE (OUTPATIENT)
Dept: PULMONOLOGY | Facility: CLINIC | Age: 73
End: 2018-12-21

## 2018-12-21 NOTE — TELEPHONE ENCOUNTER
----- Message from Balaji Kaplan MD sent at 12/21/2018  1:32 PM CST -----  Please advise patient that breathing test support the diagnosis of mild COPD from smoking.  Continue with albuterol inhaler as needed.

## 2018-12-27 ENCOUNTER — CLINICAL SUPPORT (OUTPATIENT)
Dept: CARDIOLOGY | Facility: CLINIC | Age: 73
End: 2018-12-27
Attending: INTERNAL MEDICINE
Payer: MEDICARE

## 2018-12-27 VITALS — HEIGHT: 74 IN | WEIGHT: 206 LBS | BODY MASS INDEX: 26.44 KG/M2

## 2018-12-27 DIAGNOSIS — R06.02 SOB (SHORTNESS OF BREATH): ICD-10-CM

## 2018-12-27 DIAGNOSIS — R94.39 EQUIVOCAL STRESS TEST: ICD-10-CM

## 2018-12-27 LAB
CV STRESS BASE HR: 79
DIASTOLIC BLOOD PRESSURE: 79
NUC REST DIASTOLIC VOLUME INDEX: 80
NUC REST EJECTION FRACTION: 77
NUC REST SYSTOLIC VOLUME INDEX: 18
NUC STRESS DIASTOLIC VOLUME INDEX: 80
NUC STRESS EJECTION FRACTION: 72 %
NUC STRESS SYSTOLIC VOLUME INDEX: 22
OHS CV CPX 85 PERCENT MAX PREDICTED HEART RATE MALE: 123
OHS CV CPX MAX PREDICTED HEART RATE: 145
OHS CV CPX PATIENT IS FEMALE: 0
OHS CV CPX PATIENT IS MALE: 1
OHS CV CPX PEAK DIASTOLIC BLOOD PRESSURE: 58 MMHG
OHS CV CPX PEAK HEAR RATE: 90
OHS CV CPX PEAK RATE PRESSURE PRODUCT: NORMAL
OHS CV CPX PEAK SYSTOLIC BLOOD PRESSURE: 115
OHS CV CPX PERCENT MAX PREDICTED HEART RATE ACHIEVED: 62
OHS CV CPX RATE PRESSURE PRODUCT PRESENTING: NORMAL
SYSTOLIC BLOOD PRESSURE: 132

## 2018-12-27 PROCEDURE — A9555 RB82 RUBIDIUM: HCPCS | Mod: HCNC,S$GLB,, | Performed by: INTERNAL MEDICINE

## 2018-12-27 PROCEDURE — 99999 PR PBB SHADOW E&M-EST. PATIENT-LVL I: CPT | Mod: PBBFAC,HCNC,,

## 2018-12-27 PROCEDURE — 78492 MYOCRD IMG PET MLT RST&STRS: CPT | Mod: HCNC,S$GLB,, | Performed by: INTERNAL MEDICINE

## 2018-12-27 RX ORDER — DIPYRIDAMOLE 5 MG/ML
52.44 INJECTION INTRAVENOUS
Status: COMPLETED | OUTPATIENT
Start: 2018-12-27 | End: 2018-12-27

## 2018-12-27 RX ADMIN — DIPYRIDAMOLE 52.45 MG: 5 INJECTION INTRAVENOUS at 08:12

## 2018-12-28 ENCOUNTER — OFFICE VISIT (OUTPATIENT)
Dept: GASTROENTEROLOGY | Facility: CLINIC | Age: 73
End: 2018-12-28
Payer: MEDICARE

## 2018-12-28 VITALS
DIASTOLIC BLOOD PRESSURE: 67 MMHG | WEIGHT: 209.19 LBS | HEART RATE: 87 BPM | BODY MASS INDEX: 26.85 KG/M2 | SYSTOLIC BLOOD PRESSURE: 118 MMHG | HEIGHT: 74 IN

## 2018-12-28 DIAGNOSIS — Z12.11 COLON CANCER SCREENING: ICD-10-CM

## 2018-12-28 DIAGNOSIS — Z86.010 HISTORY OF COLON POLYPS: Primary | ICD-10-CM

## 2018-12-28 DIAGNOSIS — K21.9 GASTROESOPHAGEAL REFLUX DISEASE, ESOPHAGITIS PRESENCE NOT SPECIFIED: ICD-10-CM

## 2018-12-28 PROCEDURE — 99203 OFFICE O/P NEW LOW 30 MIN: CPT | Mod: HCNC,S$GLB,, | Performed by: NURSE PRACTITIONER

## 2018-12-28 PROCEDURE — 3074F SYST BP LT 130 MM HG: CPT | Mod: CPTII,HCNC,S$GLB, | Performed by: NURSE PRACTITIONER

## 2018-12-28 PROCEDURE — 99999 PR PBB SHADOW E&M-EST. PATIENT-LVL III: CPT | Mod: PBBFAC,HCNC,, | Performed by: NURSE PRACTITIONER

## 2018-12-28 PROCEDURE — 3078F DIAST BP <80 MM HG: CPT | Mod: CPTII,HCNC,S$GLB, | Performed by: NURSE PRACTITIONER

## 2018-12-28 PROCEDURE — 1101F PT FALLS ASSESS-DOCD LE1/YR: CPT | Mod: CPTII,HCNC,S$GLB, | Performed by: NURSE PRACTITIONER

## 2018-12-28 NOTE — PROGRESS NOTES
Subjective:       Patient ID: Kamar Muñoz is a 73 y.o. male.    Chief Complaint: Colonoscopy (Hx of colon polyps )    HPI  Presents today to discuss colonoscopy for history of colon polyps.   His last colonoscopy was in 10/2013:  - Two 3 to 4 mm polyps in the proximal transverse                         colon and in the distal transverse colon. Resected                         and retrieved.                        - Diverticulosis in the sigmoid colon.                        - The examination was otherwise normal on direct                         and retroflexion views.  Pathology:  FINAL PATHOLOGIC DIAGNOSIS  1. Transverse colon sessile polyp:  Normal colonic mucosa.  2. Splenic flexure polyp:  Tubular adenoma.    Denies change in bowel habit, blood with bowel movements, black stools, abdominal pain, nausea or vomiting.   Reflux controlled with omeprazole daily.  He had normal EGD in 2007.  Denies dysphagia.    Review of Systems   Constitutional: Negative for activity change, appetite change, fatigue, fever and unexpected weight change.   HENT: Negative for trouble swallowing.    Respiratory: Negative for shortness of breath.    Cardiovascular: Negative for chest pain.   Gastrointestinal: Negative for abdominal pain, blood in stool, constipation, diarrhea, nausea and vomiting.   Skin: Negative.    Neurological: Negative.    Psychiatric/Behavioral: Negative.        Objective:      Physical Exam   Constitutional: He is oriented to person, place, and time. He appears well-developed and well-nourished. No distress.   Eyes: No scleral icterus.   Cardiovascular: Normal rate.   Pulmonary/Chest: Effort normal. No respiratory distress.   Abdominal: Soft. Bowel sounds are normal. He exhibits no distension and no mass. There is no tenderness. There is no guarding.   Musculoskeletal: Normal range of motion.   Neurological: He is alert and oriented to person, place, and time.   Skin: Skin is warm and dry. He is not  diaphoretic.   Vitals reviewed.      Assessment:       1. History of colon polyps    2. Colon cancer screening    3. Gastroesophageal reflux disease, esophagitis presence not specified        Plan:         Kamar was seen today for colonoscopy.    Diagnoses and all orders for this visit:    History of colon polyps  Comments:  Last colonoscopy 10/2013    Colon cancer screening  Comments:  Will schedule colonoscopy    Gastroesophageal reflux disease, esophagitis presence not specified  Comments:  Well controlled with OTC omeprazole         I have explained the planned procedures to the patient.The risks, benefits and alternatives of the procedure were also explained in detail. Patient verbalized understanding, all questions were answered. The patient agrees to proceed as planned

## 2018-12-28 NOTE — PATIENT INSTRUCTIONS
SUPREP Instructions    You are scheduled for a colonoscopy with Dr. Johnson on 1/28/19 at [facility] located at [address] [place] [address].  Check in at the admit desk, first floor of the hospital (which is the building on the left).     You will receive a call 2-3 days before your colonoscopy to tell you the time to arrive.  If you have not received a call by the day before your procedure, call the Endoscopy Lab at [phone].    To ensure that your test is accurate and complete, you MUST follow these instructions listed below.  If you have any questions, please call our office at [phone].  Plan on being at the hospital for your procedure for 3-4 hours.    1.  Follow a CLEAR LIQUID DIET for the entire day before your scheduled colonoscopy.  This means no solid food the entire day starting when you wake.  You may have as much of the clear liquids as you want throughout the day.   CLEAR LIQUID DIET:   - Avoid Red, Orange, Purple, and/or Blue food coloring   - NO DAIRY   - You can have:  Coffee with sugar (no creamer), tea, water, soda, apple or white grape juice, chicken or beef broth/bouillon (no meat, noodles, or veggies), green/yellow popsicles, green/yellow Jell-O, lemonade.    2.  AT 5 pm the evening before your colonoscopy, POUR ONE (1) BOTTLE OF SUPREP INTO THE MIXING CONTAINER, PROVIDED INSIDE THE BOX.  ADD WATER TO THE LINE ON THE CONTAINER AND MIX IT WELL.  DRINK THE ENTIRE CONTAINER AND THEN DRINK TWO (2) MORE CONTAINERS OF WATER OVER THE NEXT 1 HOUR.  This is sometimes easier to drink if this solution is cold, so you can mix the solution a few hours ahead of time and place in the refrigerator prior to drinking.  You have to drink the solution within 24 hours of mixing it.  Do NOT put this solution over ice.  It IS ok to drink with a straw.    3.  The endoscopy department will call you 2 days before your colonoscopy to tell you the exact time to arrive, AND to tell you the exact time to  drink the 2nd portion of your prep (which will be FIVE HOURS BEFORE YOUR ARRIVAL TIME).  At this time given to you, POUR ONE (1) BOTTLE OF SUPREP INTO THE MIXING CONTAINER, PROVIDED INSIDE THE BOX.  ADD WATER TO THE LINE ON THE CONTAINER AND MIX IT WELL.  DRINK THE ENTIRE CONTAINER AND THEN DRINK TWO (2) MORE CONTAINERS OF WATER OVER THE NEXT 1 HOUR.  This is sometimes easier to drink if this solution is cold, so you can mix the solution a few hours ahead of time and place in the refrigerator prior to drinking.  You have to drink the solution within 24 hours of mixing it.  Do NOT put this solution over ice.  It IS ok to drink with a straw. Once this is complete, you may not have ANYTHING else by mouth!    4.  You must have someone with you to DRIVE YOU HOME since you will be receiving IV sedation for the colonoscopy.    5.  It is ok to take your heart, blood pressure, and seizure medications in the morning of your test with a SIP of water.  Hold other medications until after your procedure.  Do NOT have anything else to eat or drink the morning of your colonoscopy.  It is ok to brush your teeth.    6.  If you are on blood thinners THAT YOU HAVE BEEN INSTRUCTED TO HOLD BY YOUR DOCTOR FOR THIS PROCEDURE, then do NOT take this the morning of your colonoscopy.  Do NOT stop these medications on your own, they must be approved to be held by your doctor.  Your colonoscopy can NOT be done if you are on these medications.  Examples of blood thinners include: Coumadin, Aggrenox, Plavix, Pradaxa, Reapro, Pletal, Xarelto, Ticagrelor, Brilinta, Eliquis, and high dose aspirin (325 mg).  You do not have to stop baby aspirin 81 mg.    7.  IF YOU ARE DIABETIC:  NO INSULIN OR ORAL MEDICATIONS THE MORNING OF THE COLONOSCOPY.  TAKE ONLY HALF THE DOSE OF YOUR INSULIN THE DAY BEFORE THE COLONOSCOPY.  DO NOT TAKE ANY ORAL DIABETIC MEDICATIONS THE DAY BEFORE THE COLONOSCOPY.  IF YOU ARE AN INSULIN DEPENDENT DIABETIC WITH UNSTABLE BLOOD  EKTA, NOTIFY YOUR PRIMARY CARE PHYSICIAN FOR INSTRUCTIONS.

## 2019-01-04 ENCOUNTER — NURSE TRIAGE (OUTPATIENT)
Dept: ADMINISTRATIVE | Facility: CLINIC | Age: 74
End: 2019-01-04

## 2019-01-04 DIAGNOSIS — E11.22 TYPE 2 DIABETES MELLITUS WITH STAGE 3 CHRONIC KIDNEY DISEASE, WITH LONG-TERM CURRENT USE OF INSULIN: ICD-10-CM

## 2019-01-04 DIAGNOSIS — T38.0X5A STEROID-INDUCED HYPERGLYCEMIA: ICD-10-CM

## 2019-01-04 DIAGNOSIS — R73.9 STEROID-INDUCED HYPERGLYCEMIA: ICD-10-CM

## 2019-01-04 DIAGNOSIS — N18.30 TYPE 2 DIABETES MELLITUS WITH STAGE 3 CHRONIC KIDNEY DISEASE, WITH LONG-TERM CURRENT USE OF INSULIN: ICD-10-CM

## 2019-01-04 DIAGNOSIS — Z79.4 TYPE 2 DIABETES MELLITUS WITH HYPERGLYCEMIA, WITH LONG-TERM CURRENT USE OF INSULIN: ICD-10-CM

## 2019-01-04 DIAGNOSIS — E11.65 TYPE 2 DIABETES MELLITUS WITH HYPERGLYCEMIA, WITH LONG-TERM CURRENT USE OF INSULIN: ICD-10-CM

## 2019-01-04 DIAGNOSIS — Z79.4 TYPE 2 DIABETES MELLITUS WITH STAGE 3 CHRONIC KIDNEY DISEASE, WITH LONG-TERM CURRENT USE OF INSULIN: ICD-10-CM

## 2019-01-04 NOTE — TELEPHONE ENCOUNTER
"----- Message from Kayla Valente sent at 1/4/2019  1:27 PM CST -----  Contact: Self [phone]  Patient would like a refill for insulin glargine (LANTUS) 100 unit/mL injection and insulin syringe-needle U-100 (INSULIN SYRINGE MICROFINE) 0.3 mL 28 gauge x 1/2" [facility] [address] [facility] [place]  [address] AT [facility] [address]. Patient is completely out and needs it today. Patient would like the message sent on high alert and would like a call back today as soon as the medication is sent. Please advise.     "

## 2019-01-04 NOTE — TELEPHONE ENCOUNTER
----- Message from Chandni Booth sent at 1/4/2019  4:27 PM CST -----  Contact: [phone]/self  Patient requesting a refill for insulin glargine (LANTUS) 100 unit/mL injection. Sanjuana Pino. Please advise.

## 2019-01-05 ENCOUNTER — NURSE TRIAGE (OUTPATIENT)
Dept: ADMINISTRATIVE | Facility: CLINIC | Age: 74
End: 2019-01-05

## 2019-01-05 RX ORDER — INSULIN GLARGINE 100 [IU]/ML
28 INJECTION, SOLUTION SUBCUTANEOUS NIGHTLY
Qty: 8.4 ML | Refills: 0 | Status: SHIPPED | OUTPATIENT
Start: 2019-01-05 | End: 2019-01-06

## 2019-01-05 NOTE — TELEPHONE ENCOUNTER
"Trying to reorder his lantus for several days.  Has tried several times to reach his md's office and nobody has called him back.  He is out entirely.  S/w Dr. Jelani Mane, who gave vo to refill Lantus.  Called in Lantus 35 units sq every evening, dispense enough for 30 days,no refills.      Reason for Disposition   [1] Request for URGENT new prescription or refill of "essential" medication (i.e., likelihood of harm to patient if not taken) AND [2] triager unable to fill per unit policy    Answer Assessment - Initial Assessment Questions  1. SYMPTOMS: "Do you have any symptoms?"      na  2. SEVERITY: If symptoms are present, ask "Are they mild, moderate or severe?"      Na    Out of Lantus    Protocols used: ST MEDICATION QUESTION CALL-A-AH      "

## 2019-01-06 RX ORDER — INSULIN GLARGINE 100 [IU]/ML
24 INJECTION, SOLUTION SUBCUTANEOUS NIGHTLY
Qty: 7.2 ML | Refills: 11 | Status: SHIPPED | OUTPATIENT
Start: 2019-01-06 | End: 2020-03-03 | Stop reason: SDUPTHER

## 2019-01-06 NOTE — TELEPHONE ENCOUNTER
Reason for Disposition   Caller has URGENT medication question about med that PCP prescribed and triager unable to answer question    Protocols used: ST MEDICATION QUESTION CALL-A-AH  Pt needs correct insulin prescription called in. out of insulin for 5 days. Using wife's vial. Pt states wrong med called in.   Lantus vial 28 units ea eveing.   Maribell Swartz for refill of one vial per dr pollard . LM on pharm VM at 715pm. Pt notified. Call back with questions.

## 2019-01-09 ENCOUNTER — TELEPHONE (OUTPATIENT)
Dept: CARDIOLOGY | Facility: CLINIC | Age: 74
End: 2019-01-09

## 2019-01-09 DIAGNOSIS — I27.9 CHRONIC PULMONARY HEART DISEASE: ICD-10-CM

## 2019-01-09 DIAGNOSIS — Z79.899 POLYPHARMACY: ICD-10-CM

## 2019-01-09 DIAGNOSIS — R06.82 TACHYPNEA: Primary | ICD-10-CM

## 2019-01-10 RX ORDER — PRAVASTATIN SODIUM 40 MG/1
TABLET ORAL
Qty: 90 TABLET | Refills: 3 | Status: ON HOLD | OUTPATIENT
Start: 2019-01-10 | End: 2019-01-28 | Stop reason: HOSPADM

## 2019-01-10 RX ORDER — GABAPENTIN 300 MG/1
CAPSULE ORAL
Qty: 180 CAPSULE | Refills: 3 | Status: SHIPPED | OUTPATIENT
Start: 2019-01-10 | End: 2019-02-04 | Stop reason: SDUPTHER

## 2019-01-10 RX ORDER — GABAPENTIN 300 MG/1
CAPSULE ORAL
Qty: 180 CAPSULE | Refills: 3 | Status: SHIPPED | OUTPATIENT
Start: 2019-01-10 | End: 2019-12-27 | Stop reason: SDUPTHER

## 2019-01-10 RX ORDER — PRAVASTATIN SODIUM 40 MG/1
TABLET ORAL
Qty: 90 TABLET | Refills: 3 | Status: SHIPPED | OUTPATIENT
Start: 2019-01-10 | End: 2020-01-26

## 2019-01-11 DIAGNOSIS — E11.9 TYPE 2 DIABETES MELLITUS WITHOUT COMPLICATION: ICD-10-CM

## 2019-01-24 ENCOUNTER — TELEPHONE (OUTPATIENT)
Dept: ENDOSCOPY | Facility: HOSPITAL | Age: 74
End: 2019-01-24

## 2019-01-24 NOTE — TELEPHONE ENCOUNTER
Spoke with patient's wife about arrival time @ 1130.     Prep instructions reviewed: the day before the procedure, follow a clear liquid diet all day, then start the first 1/2 of prep at 5pm and take 2nd 1/2 of prep @ 0400.  Pt must be completely NPO when prep completed @ 0530.              Medications: Do not take Insulin or oral diabetic medications the day of the procedure.  Take as prescribed: heart, seizure and blood pressure medication in the morning with a sip of water (less than an ounce).  Take any breathing medications and bring inhalers to hospital with you Leave all valuables and jewelry at home.     Wear comfortable clothes to procedure to change into hospital gown You cannot drive for 24 hours after your procedure because you will receive sedation for your procedure to make you comfortable.  A ride must be provided at discharge.

## 2019-01-28 ENCOUNTER — ANESTHESIA EVENT (OUTPATIENT)
Dept: ENDOSCOPY | Facility: HOSPITAL | Age: 74
End: 2019-01-28
Payer: MEDICARE

## 2019-01-28 ENCOUNTER — ANESTHESIA (OUTPATIENT)
Dept: ENDOSCOPY | Facility: HOSPITAL | Age: 74
End: 2019-01-28
Payer: MEDICARE

## 2019-01-28 ENCOUNTER — HOSPITAL ENCOUNTER (OUTPATIENT)
Facility: HOSPITAL | Age: 74
Discharge: HOME | End: 2019-01-28
Attending: INTERNAL MEDICINE | Admitting: INTERNAL MEDICINE
Payer: MEDICARE

## 2019-01-28 VITALS
HEART RATE: 79 BPM | RESPIRATION RATE: 20 BRPM | TEMPERATURE: 99 F | DIASTOLIC BLOOD PRESSURE: 72 MMHG | WEIGHT: 198 LBS | HEIGHT: 74 IN | BODY MASS INDEX: 25.41 KG/M2 | SYSTOLIC BLOOD PRESSURE: 144 MMHG | OXYGEN SATURATION: 96 %

## 2019-01-28 DIAGNOSIS — Z86.010 PERSONAL HISTORY OF COLONIC POLYPS: Primary | ICD-10-CM

## 2019-01-28 PROBLEM — Z86.0100 PERSONAL HISTORY OF COLONIC POLYPS: Status: ACTIVE | Noted: 2019-01-28

## 2019-01-28 PROCEDURE — 37000009 HC ANESTHESIA EA ADD 15 MINS: Mod: HCNC | Performed by: INTERNAL MEDICINE

## 2019-01-28 PROCEDURE — G0105 COLORECTAL SCRN; HI RISK IND: HCPCS | Mod: HCNC | Performed by: INTERNAL MEDICINE

## 2019-01-28 PROCEDURE — G0105 COLORECTAL SCRN; HI RISK IND: ICD-10-PCS | Mod: HCNC,,, | Performed by: INTERNAL MEDICINE

## 2019-01-28 PROCEDURE — 37000008 HC ANESTHESIA 1ST 15 MINUTES: Mod: HCNC | Performed by: INTERNAL MEDICINE

## 2019-01-28 PROCEDURE — 25000003 PHARM REV CODE 250: Mod: HCNC | Performed by: INTERNAL MEDICINE

## 2019-01-28 PROCEDURE — G0105 COLORECTAL SCRN; HI RISK IND: HCPCS | Mod: HCNC,,, | Performed by: INTERNAL MEDICINE

## 2019-01-28 PROCEDURE — 63600175 PHARM REV CODE 636 W HCPCS: Mod: HCNC | Performed by: NURSE ANESTHETIST, CERTIFIED REGISTERED

## 2019-01-28 RX ORDER — LIDOCAINE HCL/PF 100 MG/5ML
SYRINGE (ML) INTRAVENOUS
Status: DISCONTINUED | OUTPATIENT
Start: 2019-01-28 | End: 2019-01-28

## 2019-01-28 RX ORDER — PROPOFOL 10 MG/ML
VIAL (ML) INTRAVENOUS CONTINUOUS PRN
Status: DISCONTINUED | OUTPATIENT
Start: 2019-01-28 | End: 2019-01-28

## 2019-01-28 RX ORDER — SODIUM CHLORIDE 9 MG/ML
INJECTION, SOLUTION INTRAVENOUS CONTINUOUS
Status: DISCONTINUED | OUTPATIENT
Start: 2019-01-28 | End: 2019-01-28 | Stop reason: HOSPADM

## 2019-01-28 RX ORDER — PROPOFOL 10 MG/ML
VIAL (ML) INTRAVENOUS
Status: DISCONTINUED | OUTPATIENT
Start: 2019-01-28 | End: 2019-01-28

## 2019-01-28 RX ORDER — SODIUM CHLORIDE 0.9 % (FLUSH) 0.9 %
3 SYRINGE (ML) INJECTION
Status: DISCONTINUED | OUTPATIENT
Start: 2019-01-28 | End: 2019-01-28 | Stop reason: HOSPADM

## 2019-01-28 RX ADMIN — PROPOFOL 60 MG: 10 INJECTION, EMULSION INTRAVENOUS at 12:01

## 2019-01-28 RX ADMIN — SODIUM CHLORIDE: 0.9 INJECTION, SOLUTION INTRAVENOUS at 12:01

## 2019-01-28 RX ADMIN — LIDOCAINE HYDROCHLORIDE 100 MG: 20 INJECTION, SOLUTION INTRAVENOUS at 12:01

## 2019-01-28 RX ADMIN — PROPOFOL 150 MCG/KG/MIN: 10 INJECTION, EMULSION INTRAVENOUS at 12:01

## 2019-01-28 NOTE — H&P
CC: Personal h/o colon polyps    73 year old M with personal h/o adenomatous polyps on colonoscopy 2013.    ROS:  No headache, no fever/chills, no chest pain/SOB, no dysuria/hematuria.    VSSAF   Exam:   Alert and oriented x 3; no apparent distress   PERRLA, sclera anicteric  CV: Regular rate/rhythm, normal PMI   Lungs: Clear bilaterally with no wheeze/rales   Abdomen: Soft, NT/ND, normal bowel sounds   Ext: No cyanosis, clubbing     Impression:   Personal h/o colon polyps    Plan:   Proceed with endoscopy. Further recs to follow.

## 2019-01-28 NOTE — DISCHARGE INSTRUCTIONS
hemorrhoids  Hemorrhoids    Hemorrhoids are swollen and inflamed veins inside the rectum and near the anus. The rectum is the last several inches of the colon. The anus is the passage between the rectum and the outside of the body.  Causes  The veins can become swollen due to increased pressure in them. This is most often caused by:  · Chronic constipation or diarrhea  · Straining when having a bowel movement  · Sitting too long on the toilet  · A low-fiber diet  · Pregnancy  Symptoms  · Bleeding from the rectum (this may be noticeable after bowel movements)  · Lump near the anus  · Itching around the anus  · Pain around the anus  There are different types of hemorrhoids. Depending on the type you have and the severity, you may be able to treat yourself at home. In some cases, a procedure may be the best treatment option. Your healthcare provider can tell you more about this, if needed.  Home care  General care  · To get relief from pain or itching, try:  ¨ Topical products. Your healthcare provider may prescribe or recommend creams, ointments, or pads that can be applied to the hemorrhoid. Use these exactly as directed.  ¨ Medicines. Your healthcare provider may recommend stool softeners, suppositories, or laxatives to help manage constipation. Use these exactly as directed.  ¨ Sitz baths. A sitz bath involves sitting in a few inches of warm bath water. Be careful not to make the water so hot that you burn yourself--test it before sitting in it. Soak for about 10 to 15 minutes a few times a day. This may help relieve pain.  Tips to help prevent hemorrhoids  · Eat more fiber. Fiber adds bulk to stool and absorbs water as it moves through your colon. This makes stool softer and easier to pass.  ¨ Increase the fiber in your diet with more fiber-rich foods. These include fresh fruit, vegetables, and whole grains.  ¨ Take a fiber supplement or bulking agent, if advised to by your provider. These include products such as  psyllium or methylcellulose.  · Drink plenty of water, if directed to by your provider. This can help keep stool soft.  · Be more active. Frequent exercise aids digestion and helps prevent constipation. It may also help make bowel movements more regular.  · Dont strain during bowel movements. This can make hemorrhoids more likely. Also, dont sit on the toilet for long periods of time.  Follow-up care  Follow up with your healthcare provider, or as advised. If a culture or imaging tests were done, you will be notified of the results when they are ready. This may take a few days or longer.  When to seek medical advice  Call your healthcare provider right away if any of these occur:  · Increased bleeding from the rectum  · Increased pain around the rectum or anus  · Weakness or dizziness  Call 911  Call 911 or return to the emergency department right away if any of these occur:  · Trouble breathing or swallowing  · Fainting or loss of consciousness  · Unusually fast heart rate  · Vomiting blood  · Large amounts of blood in stool  Date Last Reviewed: 6/22/2015  © 0224-0821 [employer]. [address] [place] [address]. All rights reserved. This information is not intended as a substitute for professional medical care. Always follow your healthcare professional's instructions.        Diverticulosis    Diverticulosis means that small pouches have formed in the wall of your large intestine (colon). Most often, this problem causes no symptoms and is common as people age. But the pouches in the colon are at risk of becoming infected. When this happens, the condition is called diverticulitis. Although most people with diverticulosis never develop diverticulitis, it is still not uncommon. Rectal bleeding can also occur and in less common situations, a type of colon inflammation called colitis.  While most people do not have symptoms, some people with diverticulosis may have:  · Abdominal cramps and  pain  · Bloating  · Constipation  · Change in bowel habits  Causes  The exact cause of diverticulosis (and diverticulitis) has not been proved, but a few things are associated with the condition:  · Low-fiber diet  · Constipation  · Lack of exercise  Your healthcare provider will talk with you about how to manage your condition. Diet changes may be all that are needed to help control diverticulosis and prevent progression to diverticulitis. If you develop diverticulitis, you will likely need other treatments.  Home care  You may be told to take fiber supplements daily. Fiber adds bulk to the stool so that it passes through the colon more easily. Stool softeners may be recommended. You may also be given medications for pain relief. Be sure to take all medications as directed.  In the past, people were told to avoid corn, nuts, and seeds. This is no longer necessary.  Follow these guidelines when caring for yourself at home:  · Eat unprocessed foods that are high in fiber. Whole grains, fruits, and vegetables are good choices.  · Drink 6 to 8 glasses of water every day unless your healthcare provider has you limit how much fluid you should have.  · Watch for changes in your bowel movements. Tell your provider if you notice any changes.  · Begin an exercise program. Ask your provider how to get started. Generally, walking is the best.  · Get plenty of rest and sleep.  Follow-up care  Follow up with your healthcare provider, or as advised. Regular visits may be needed to check on your health. Sometimes special procedures such as colonoscopy, are needed after an episode of diverticulitis or blooding. Be sure to keep all your appointments.  If a stool sample was taken, or cultures were done, you should be told if they are positive, or if your treatment needs to be changed. You can call as directed for the results.  If X-rays were done, a radiologist will look at them. You will be told if there is a change in your  treatment.  If antibiotics were prescribed, be sure to finish them all.  When to seek medical advice  Call your healthcare provider right away if any of these occur:  · Fever of 100.4°F (38°C) or higher, or as directed by your healthcare provider  · Severe cramps in the lower left side of the abdomen or pain that is getting worse  · Tenderness in the lower left side of the abdomen or worsening pain throughout the abdomen  · Diarrhea or constipation that doesn't get better within 24 hours  · Nausea and vomiting  · Bleeding from the rectum  Call 911  Call emergency services if any of the following occur:  · Trouble breathing  · Confusion  · Very drowsy or trouble awakening  · Fainting or loss of consciousness  · Rapid heart rate  · Chest pain  Date Last Reviewed: 12/30/2015 © 2000-2017 [employer]. [address] [place] [address], [place] [address]. All rights reserved. This information is not intended as a substitute for professional medical care. Always follow your healthcare professional's instructions.

## 2019-01-28 NOTE — ANESTHESIA POSTPROCEDURE EVALUATION
"Anesthesia Post Evaluation    Patient: Kamar Muñoz    Procedure(s) Performed: Procedure(s) (LRB):  COLONOSCOPY Suprep (N/A)    Final Anesthesia Type: MAC  Patient location during evaluation: GI PACU  Patient participation: Yes- Able to Participate  Level of consciousness: awake and alert  Post-procedure vital signs: reviewed and stable  Pain management: adequate  Airway patency: patent  PONV status at discharge: No PONV  Anesthetic complications: no      Cardiovascular status: blood pressure returned to baseline  Respiratory status: unassisted  Hydration status: euvolemic  Follow-up not needed.        Visit Vitals  BP (!) 142/91 (BP Location: Left arm)   Pulse 77   Temp 36.9 °C (98.4 °F) (Temporal)   Resp 18   Ht 6' 2" (1.88 m)   Wt 89.8 kg (198 lb)   SpO2 (!) 90%   BMI 25.42 kg/m²       Pain/Patrice Score: No Data Recorded      "

## 2019-01-28 NOTE — [employer] PATIENT INSTRUCTIONS
Discharge Summary/Instructions after an Endoscopic Procedure  Patient Name: Kamar Muñoz  Patient MRN: [id number]  Patient YOB: 1945 Monday, January 28, 2019  Anh Johnson MD  RESTRICTIONS:  During your procedure today, you received medications for sedation.  These   medications may affect your judgment, balance and coordination.  Therefore,   for 24 hours, you have the following restrictions:   - DO NOT drive a car, operate machinery, make legal/financial decisions,   sign important papers or drink alcohol.    ACTIVITY:  Today: no heavy lifting, straining or running due to procedural   sedation/anesthesia.  The following day: return to full activity including work.  DIET:  Eat and drink normally unless instructed otherwise.     TREATMENT FOR COMMON SIDE EFFECTS:  - Mild abdominal pain, nausea, belching, bloating or excessive gas:  rest,   eat lightly and use a heating pad.  - Sore Throat: treat with throat lozenges and/or gargle with warm salt   water.  - Because air was used during the procedure, expelling large amounts of air   from your rectum or belching is normal.  - If a bowel prep was taken, you may not have a bowel movement for 1-3 days.    This is normal.  SYMPTOMS TO WATCH FOR AND REPORT TO YOUR PHYSICIAN:  1. Abdominal pain or bloating, other than gas cramps.  2. Chest pain.  3. Back pain.  4. Signs of infection such as: chills or fever occurring within 24 hours   after the procedure.  5. Rectal bleeding, which would show as bright red, maroon, or black stools.   (A tablespoon of blood from the rectum is not serious, especially if   hemorrhoids are present.)  6. Vomiting.  7. Weakness or dizziness.  GO DIRECTLY TO THE NEAREST EMERGENCY ROOM IF YOU HAVE ANY OF THE FOLLOWING:      Difficulty breathing              Chills and/or fever over 101 F   Persistent vomiting and/or vomiting blood   Severe abdominal pain   Severe chest pain   Black, tarry stools   Bleeding- more than one  tablespoon   Any other symptom or condition that you feel may need urgent attention  Your doctor recommends these additional instructions:  If any biopsies were taken, your doctors clinic will contact you in 1 to 2   weeks with any results.  - Discharge patient to home.   - Patient has a contact number available for emergencies.  The signs and   symptoms of potential delayed complications were discussed with the   patient.  Return to normal activities tomorrow.  Written discharge   instructions were provided to the patient.   - Resume previous diet.   - Continue present medications.   - Repeat colonoscopy in 5 years for surveillance.   - Return to GI clinic PRN.  For questions, problems or results please call your physician - Anh Johnson MD at Work:  [phone].  EMERGENCY PHONE NUMBER: [phone],  LAB RESULTS: [phone]  IF A COMPLICATION OR EMERGENCY SITUATION ARISES AND YOU ARE UNABLE TO REACH   YOUR PHYSICIAN - GO DIRECTLY TO THE EMERGENCY ROOM.  MD Anh Guillaume MD  1/28/2019 12:49:28 PM  This report has been verified and signed electronically.  [employer]

## 2019-01-28 NOTE — PLAN OF CARE
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 8/2/2012    Type II or unspecified type diabetes mellitus with neurological manifestations, uncontrolled(250.62) 3/8/2013    Urinary tract infection    Dr. Johnson spoke to patient's wife post procedure, discussed findings and recommendations with patient and family member; all questions asked and answered. Verbalized understanding of information give. Handout provided at time of discharge.

## 2019-01-28 NOTE — ANESTHESIA PREPROCEDURE EVALUATION
01/28/2019  Kamar Muñoz is a 73 y.o., male for colonoscopy under MAC    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 8/2/2012    Type II or unspecified type diabetes mellitus with neurological manifestations, uncontrolled(250.62) 3/8/2013    Urinary tract infection    COPD    Vitals:    01/28/19 1156   BP: (!) 142/91   Pulse: 77   Resp: 18   Temp: 36.9 °C (98.4 °F)           Anesthesia Evaluation    I have reviewed the Patient Summary Reports.    I have reviewed the Nursing Notes.   I have reviewed the Medications.     Review of Systems  Social:  Former Smoker    Cardiovascular:   Exercise tolerance: good        Physical Exam  General:  Well nourished    Airway/Jaw/Neck:  Airway Findings: Mallampati: II      Chest/Lungs:  Chest/Lungs Clear    Heart/Vascular:  Heart Findings: Normal        CONCLUSIONS     1 - Normal left ventricular systolic function (EF 60-65%).     2 - Normal left ventricular diastolic function.     3 - No wall motion abnormalities.     4 - Normal right ventricular systolic function .     5 - The estimated PA systolic pressure is 28 mmHg.             This document has been electronically    SIGNED BY: Huey Cifuentes MD On: 07/06/2018 14:28    Anesthesia Plan  Type of Anesthesia, risks & benefits discussed:  Anesthesia Type:  MAC  Patient's Preference:   Intra-op Monitoring Plan:   Intra-op Monitoring Plan Comments:   Post Op Pain Control Plan:   Post Op Pain Control Plan Comments:   Induction:    Beta Blocker:  Patient is not currently on a Beta-Blocker (No further documentation required).       Informed Consent: Patient understands risks and agrees with Anesthesia plan.  Questions answered. Anesthesia consent signed  with patient.  ASA Score: 3     Day of Surgery Review of History & Physical:            Ready For Surgery From Anesthesia Perspective.

## 2019-02-01 ENCOUNTER — LAB VISIT (OUTPATIENT)
Dept: LAB | Facility: HOSPITAL | Age: 74
End: 2019-02-01
Attending: INTERNAL MEDICINE
Payer: MEDICARE

## 2019-02-01 DIAGNOSIS — R06.82 TACHYPNEA: ICD-10-CM

## 2019-02-01 DIAGNOSIS — Z79.899 POLYPHARMACY: ICD-10-CM

## 2019-02-01 LAB
ALBUMIN SERPL BCP-MCNC: 3.4 G/DL
ALP SERPL-CCNC: 98 U/L
ALT SERPL W/O P-5'-P-CCNC: 17 U/L
ANION GAP SERPL CALC-SCNC: 8 MMOL/L
AST SERPL-CCNC: 18 U/L
BASOPHILS # BLD AUTO: 0.04 K/UL
BASOPHILS NFR BLD: 0.7 %
BILIRUB SERPL-MCNC: 0.8 MG/DL
BNP SERPL-MCNC: 60 PG/ML
BUN SERPL-MCNC: 15 MG/DL
CALCIUM SERPL-MCNC: 9 MG/DL
CHLORIDE SERPL-SCNC: 105 MMOL/L
CO2 SERPL-SCNC: 28 MMOL/L
CREAT SERPL-MCNC: 1.1 MG/DL
DIFFERENTIAL METHOD: ABNORMAL
EOSINOPHIL # BLD AUTO: 0.2 K/UL
EOSINOPHIL NFR BLD: 3.5 %
ERYTHROCYTE [DISTWIDTH] IN BLOOD BY AUTOMATED COUNT: 11.8 %
EST. GFR  (AFRICAN AMERICAN): >60 ML/MIN/1.73 M^2
EST. GFR  (NON AFRICAN AMERICAN): >60 ML/MIN/1.73 M^2
GLUCOSE SERPL-MCNC: 132 MG/DL
HCT VFR BLD AUTO: 47 %
HGB BLD-MCNC: 14.9 G/DL
IMM GRANULOCYTES # BLD AUTO: 0.01 K/UL
IMM GRANULOCYTES NFR BLD AUTO: 0.2 %
LYMPHOCYTES # BLD AUTO: 1.8 K/UL
LYMPHOCYTES NFR BLD: 31.2 %
MAGNESIUM SERPL-MCNC: 1.8 MG/DL
MCH RBC QN AUTO: 28.4 PG
MCHC RBC AUTO-ENTMCNC: 31.7 G/DL
MCV RBC AUTO: 90 FL
MONOCYTES # BLD AUTO: 0.6 K/UL
MONOCYTES NFR BLD: 10.3 %
NEUTROPHILS # BLD AUTO: 3.1 K/UL
NEUTROPHILS NFR BLD: 54.1 %
NRBC BLD-RTO: 0 /100 WBC
PLATELET # BLD AUTO: 222 K/UL
PMV BLD AUTO: 10.1 FL
POTASSIUM SERPL-SCNC: 4.3 MMOL/L
PROT SERPL-MCNC: 6.7 G/DL
RBC # BLD AUTO: 5.24 M/UL
SODIUM SERPL-SCNC: 141 MMOL/L
WBC # BLD AUTO: 5.65 K/UL

## 2019-02-01 PROCEDURE — 83880 ASSAY OF NATRIURETIC PEPTIDE: CPT | Mod: HCNC

## 2019-02-01 PROCEDURE — 80053 COMPREHEN METABOLIC PANEL: CPT | Mod: HCNC

## 2019-02-01 PROCEDURE — 83735 ASSAY OF MAGNESIUM: CPT | Mod: HCNC

## 2019-02-01 PROCEDURE — 36415 COLL VENOUS BLD VENIPUNCTURE: CPT | Mod: HCNC,PO

## 2019-02-01 PROCEDURE — 85025 COMPLETE CBC W/AUTO DIFF WBC: CPT | Mod: HCNC

## 2019-02-04 ENCOUNTER — INITIAL CONSULT (OUTPATIENT)
Dept: TRANSPLANT | Facility: CLINIC | Age: 74
End: 2019-02-04
Payer: MEDICARE

## 2019-02-04 ENCOUNTER — HOSPITAL ENCOUNTER (OUTPATIENT)
Dept: PULMONOLOGY | Facility: CLINIC | Age: 74
Discharge: HOME | End: 2019-02-04
Payer: MEDICARE

## 2019-02-04 VITALS
OXYGEN SATURATION: 94 % | DIASTOLIC BLOOD PRESSURE: 64 MMHG | HEART RATE: 82 BPM | WEIGHT: 205.5 LBS | SYSTOLIC BLOOD PRESSURE: 127 MMHG | HEIGHT: 74 IN | BODY MASS INDEX: 26.37 KG/M2

## 2019-02-04 VITALS — WEIGHT: 205.5 LBS | HEIGHT: 73 IN | BODY MASS INDEX: 27.23 KG/M2

## 2019-02-04 DIAGNOSIS — R06.09 DOE (DYSPNEA ON EXERTION): ICD-10-CM

## 2019-02-04 DIAGNOSIS — E66.9 OBESITY, UNSPECIFIED CLASSIFICATION, UNSPECIFIED OBESITY TYPE, UNSPECIFIED WHETHER SERIOUS COMORBIDITY PRESENT: ICD-10-CM

## 2019-02-04 DIAGNOSIS — I10 ESSENTIAL HYPERTENSION: Primary | ICD-10-CM

## 2019-02-04 DIAGNOSIS — I27.9 CHRONIC PULMONARY HEART DISEASE: ICD-10-CM

## 2019-02-04 DIAGNOSIS — J43.2 CENTRILOBULAR EMPHYSEMA: ICD-10-CM

## 2019-02-04 PROCEDURE — 3046F PR MOST RECENT HEMOGLOBIN A1C LEVEL > 9.0%: ICD-10-PCS | Mod: HCNC,CPTII,S$GLB, | Performed by: INTERNAL MEDICINE

## 2019-02-04 PROCEDURE — 99999 PR PBB SHADOW E&M-EST. PATIENT-LVL III: CPT | Mod: PBBFAC,HCNC,, | Performed by: INTERNAL MEDICINE

## 2019-02-04 PROCEDURE — 94618 PULMONARY STRESS TESTING: ICD-10-PCS | Mod: HCNC,S$GLB,, | Performed by: INTERNAL MEDICINE

## 2019-02-04 PROCEDURE — 3074F PR MOST RECENT SYSTOLIC BLOOD PRESSURE < 130 MM HG: ICD-10-PCS | Mod: HCNC,CPTII,S$GLB, | Performed by: INTERNAL MEDICINE

## 2019-02-04 PROCEDURE — 3074F SYST BP LT 130 MM HG: CPT | Mod: HCNC,CPTII,S$GLB, | Performed by: INTERNAL MEDICINE

## 2019-02-04 PROCEDURE — 1101F PR PT FALLS ASSESS DOC 0-1 FALLS W/OUT INJ PAST YR: ICD-10-PCS | Mod: HCNC,CPTII,S$GLB, | Performed by: INTERNAL MEDICINE

## 2019-02-04 PROCEDURE — 99204 OFFICE O/P NEW MOD 45 MIN: CPT | Mod: HCNC,S$GLB,, | Performed by: INTERNAL MEDICINE

## 2019-02-04 PROCEDURE — 1101F PT FALLS ASSESS-DOCD LE1/YR: CPT | Mod: HCNC,CPTII,S$GLB, | Performed by: INTERNAL MEDICINE

## 2019-02-04 PROCEDURE — 3078F PR MOST RECENT DIASTOLIC BLOOD PRESSURE < 80 MM HG: ICD-10-PCS | Mod: HCNC,CPTII,S$GLB, | Performed by: INTERNAL MEDICINE

## 2019-02-04 PROCEDURE — 99204 PR OFFICE/OUTPT VISIT, NEW, LEVL IV, 45-59 MIN: ICD-10-PCS | Mod: HCNC,S$GLB,, | Performed by: INTERNAL MEDICINE

## 2019-02-04 PROCEDURE — 94618 PULMONARY STRESS TESTING: CPT | Mod: HCNC,S$GLB,, | Performed by: INTERNAL MEDICINE

## 2019-02-04 PROCEDURE — 99499 RISK ADDL DX/OHS AUDIT: ICD-10-PCS | Mod: HCNC,S$GLB,, | Performed by: INTERNAL MEDICINE

## 2019-02-04 PROCEDURE — 3046F HEMOGLOBIN A1C LEVEL >9.0%: CPT | Mod: HCNC,CPTII,S$GLB, | Performed by: INTERNAL MEDICINE

## 2019-02-04 PROCEDURE — 99999 PR PBB SHADOW E&M-EST. PATIENT-LVL III: ICD-10-PCS | Mod: PBBFAC,HCNC,, | Performed by: INTERNAL MEDICINE

## 2019-02-04 PROCEDURE — 3078F DIAST BP <80 MM HG: CPT | Mod: HCNC,CPTII,S$GLB, | Performed by: INTERNAL MEDICINE

## 2019-02-04 PROCEDURE — 99499 UNLISTED E&M SERVICE: CPT | Mod: HCNC,S$GLB,, | Performed by: INTERNAL MEDICINE

## 2019-02-04 NOTE — PATIENT INSTRUCTIONS
If your shortness of breath gets worse, call the lung doctor as they are best able to help get it under control    Check your weights every morning after getting out of bed and urinating. If your weight goes up 3# overnight or 5# in one week let your doctor know    Keep salt intake to under 2000 mg sodium, fluids to under 2 L (64 oz)        Low-Salt Diet  This diet removes foods that are high in salt. It also limits the amount of salt you use when cooking. It is most often used for people with high blood pressure, edema (fluid retention), and kidney, liver, or heart disease.  Table salt contains the mineral sodium. Your body needs sodium to work normally. But too much sodium can make your health problems worse. Your healthcare provider is recommending a low-salt (also called low-sodium) diet for you. Your total daily allowance of salt is 1,500 to 2,300 milligrams (mg). It is less than 1 teaspoon of table salt. This means you can have only about 500 to 700 mg of sodium at each meal. People with certain health problems should limit salt intake to the lower end of the recommended range.    When you cook, dont add much salt. If you can cook without using salt, even better. Dont add salt to your food at the table.  When shopping, read food labels. Salt is often called sodium on the label. Choose foods that are salt-free, low salt, or very low salt. Note that foods with reduced salt may not lower your salt intake enough.    Beans, potatoes, and pasta  Ok: Dry beans, split peas, lentils, potatoes, rice, macaroni, pasta, spaghetti without added salt  Avoid: Potato chips, tortilla chips, and similar products  Breads and cereals  Ok: Low-sodium breads, rolls, cereals, and cakes; low-salt crackers, matzo crackers  Avoid: Salted crackers, pretzels, popcorn, Azeri toast, pancakes, muffins  Dairy  Ok: Milk, chocolate milk, hot chocolate mix, low-salt cheeses, and yogurt  Avoid: Processed cheese and cheese spreads; Roquefort,  Camembert, and cottage cheese; buttermilk, instant breakfast drink  Desserts  Ok: Ice cream, frozen yogurt, juice bars, gelatin, cookies and pies, sugar, honey, jelly, hard candy  Avoid: Most pies, cakes and cookies prepared or processed with salt; instant pudding  Drinks  Ok: Tea, coffee, fizzy (carbonated) drinks, juices  Avoid: Flavored coffees, electrolyte replacement drinks, sports drinks  Meats  Ok: All fresh meat, fish, poultry, low-salt tuna, eggs, egg substitute  Avoid: Smoked, pickled, brine-cured, or salted meats and fish. This includes vazquez, chipped beef, corned beef, hot dogs, deli meats, ham, kosher meats, salt pork, sausage, canned tuna, salted codfish, smoked salmon, herring, sardines, or anchovies.  Seasonings and spices  Ok: Most seasonings are okay. Good substitutes for salt include: fresh herb blends, hot sauce, lemon, garlic, medrano, vinegar, dry mustard, parsley, cilantro, horseradish, tomato paste, regular margarine, mayonnaise, unsalted butter, cream cheese, vegetable oil, cream, low-salt salad dressing and gravy.  Avoid: Regular ketchup, relishes, pickles, soy sauce, teriyaki sauce, Worcestershire sauce, BBQ sauce, tartar sauce, meat tenderizer, chili sauce, regular gravy, regular salad dressing, salted butter  Soups  Ok: Low-salt soups and broths made with allowed foods  Avoid: Bouillon cubes, soups with smoked or salted meats, regular soup and broth  Vegetables  Ok: Most vegetables are okay; also low-salt tomato and vegetable juices  Avoid: Sauerkraut and other brine-soaked vegetables; pickles and other pickled vegetables; tomato juice, olives  Date Last Reviewed: 8/1/2016  © 4298-5424 [employer]. [address] [place] [address], [place] [address]. All rights reserved. This information is not intended as a substitute for professional medical care. Always follow your healthcare professional's instructions.

## 2019-02-04 NOTE — PROGRESS NOTES
Subjective:    Patient ID:  Kamar Muñoz is a 73 y.o. male who presents for evaluation of Pulmonary Hypertension.    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    Pt reports he can't walk that far without getting SOB, or climbing a ladder, will be SOB at the time- says he can walk [employer] if he goes slow- exercises regularly- rides stationary bike and light weights, most days unless he has something he has to do (at least 5x/wk)  Just got back from [place] and was able to walk the park.  Says he saw Pulmonary who told him he had COPD. Does not retain fluid- every now and then has swelling in his legs but relates this to having his veins removed.  Has had a couple of episodes of CP when he over does it (feeding the PETs)- when he stops it goes aways, though may take alittle bit. Says he is winded when it starts. Was about prison through his 6mw when he got winded today. Gets it at work too, but slows his pace and he's ok.  Sleeps on 1 pillows, no PND.  Bp usually well controlled.     SH   Patient smoked 1.5 ppd ~25 years.  Patient quit smoking 1980s    FH: unknown    PET Stress 12/18  · relative PET images show no clinically significant regional resting or stress induced perfusion defect.    Six Minute Walk Test:   427  m (   m in    )                                              O2 sat  98 ->94  %                                                           HR 81  -> 98                                                                 BP  116 / 68  ->133 /64                                                         Harshal   0  -> 4    Echo        ISAI 12/18  · Normal left  ventricular systolic function. The estimated ejection fraction is 55%  · No wall motion abnormalities.  · Indeterminate left ventricular diastolic function.  · Trace mitral regurgitation.  · Normal right ventricular systolic function.  · Trace tricuspid regurgitation.  · The estimated PA systolic pressure is 24.34 mm Hg  · Normal central venous pressure (3 mm Hg).  · Mild left atrial enlargement.  · The stress echo portion of this study is negative for myocardial ischemia; however, there was failure to reach target heart rate (achieved 75% of max predicted heart rate).  · The EKG portion of this study is negative for myocardial ischemia.  · Arrhythmias during stress: occasional PVCs.  · The patient reported no symptoms during the stress test.  · Overall, the patient's exercise capacity was above average.  ·   Results for orders placed or performed during the hospital encounter of 07/06/18   2D echo with color flow doppler   Result Value Ref Range    QEF 65 55 - 65    Mitral Valve Regurgitation MILD     Diastolic Dysfunction No     Est. PA Systolic Pressure 27.8     Pericardial Effusion NONE    right ventricle is normal in size measuring 2.9 cm at the base in the apical right ventricle-focused view. Global right ventricular systolic function appears normal. The estimated PA systolic pressure is 28 mmHg.       RHC   /      /  n/a      CXR   11/ 27 /18  Heart size is normal.  There is emphysema and biapical scar    CT Chest    /    /     n/a    PFTs   12 / 12 /18    FVC    3.9  L  89    % pred  FEV1   2.5 L  73  % pred  FEV1/FVC  65   %  TLC 6 L 88 %pred  DLCO   68   % pred     V/Q Scan  /    /  n/a    Review of Systems   Constitution: Negative for chills, fever, malaise/fatigue and weight gain.   HENT: Positive for congestion.    Eyes: Negative.    Cardiovascular: Positive for chest pain, dyspnea on exertion and leg swelling. Negative for near-syncope, orthopnea, palpitations, paroxysmal nocturnal dyspnea and  "syncope.   Respiratory: Negative for cough and shortness of breath.    Endocrine: Negative.    Skin: Negative.    Musculoskeletal: Negative.    Gastrointestinal: Negative for bloating, abdominal pain and change in bowel habit.   Neurological: Negative for dizziness and light-headedness.   Psychiatric/Behavioral: Negative for depression.        Objective:  /64 (BP Location: Left arm, Patient Position: Sitting, BP Method: Medium (Automatic))   Pulse 82   Ht 6' 2" (1.88 m)   Wt 93.2 kg (205 lb 7.5 oz)   SpO2 (!) 94%   BMI 26.38 kg/m²       Physical Exam   Constitutional: He is oriented to person, place, and time. He appears well-developed and well-nourished.   HENT:   Head: Normocephalic and atraumatic.   Eyes: Right eye exhibits no discharge. Left eye exhibits no discharge.   Neck: Neck supple. No JVD present. No thyromegaly present.   Cardiovascular: Normal rate and regular rhythm. Exam reveals no gallop and no friction rub.   No murmur heard.       Pulmonary/Chest: Effort normal and breath sounds normal. No respiratory distress. He has no wheezes. He has no rales.   Abdominal: Soft. Bowel sounds are normal. He exhibits no distension. There is no tenderness.   Musculoskeletal: Normal range of motion. He exhibits no edema or tenderness.   Neurological: He is alert and oriented to person, place, and time. No cranial nerve deficit. Coordination normal.   Skin: Skin is warm and dry. No rash noted.   Psychiatric: He has a normal mood and affect. Judgment and thought content normal.           Chemistry        Component Value Date/Time     02/01/2019 0918    K 4.3 02/01/2019 0918     02/01/2019 0918    CO2 28 02/01/2019 0918    BUN 15 02/01/2019 0918    CREATININE 1.1 02/01/2019 0918     (H) 02/01/2019 0918        Component Value Date/Time    CALCIUM 9.0 02/01/2019 0918    ALKPHOS 98 02/01/2019 0918    AST 18 02/01/2019 0918    ALT 17 02/01/2019 0918    BILITOT 0.8 02/01/2019 0918    " ESTGFRAFRICA >60.0 02/01/2019 0918    EGFRNONAA >60.0 02/01/2019 0918            Magnesium   Date Value Ref Range Status   02/01/2019 1.8 1.6 - 2.6 mg/dL Final       Lab Results   Component Value Date    WBC 5.65 02/01/2019    HGB 14.9 02/01/2019    HCT 47.0 02/01/2019    MCV 90 02/01/2019     02/01/2019       No results found for: INR, PROTIME    BNP   Date Value Ref Range Status   02/01/2019 60 0 - 99 pg/mL Final     Comment:     Values of less than 100 pg/ml are consistent with non-CHF populations.   11/27/2018 33 0 - 99 pg/mL Final     Comment:     Values of less than 100 pg/ml are consistent with non-CHF populations.   07/02/2018 87 0 - 99 pg/mL Final     Comment:     Values of less than 100 pg/ml are consistent with non-CHF populations.        No results found for: LDH          Assessment:       1. PETTY (dyspnea on exertion)- referred for eval of PH but without any evidence of PH by echo (low PAP, normal RV size and fxn)- does have mild COPD after years of smoking which likely accounts for his SOB, as well as h/o CAD (recent stress test (-) ischemia but failed to reach target HR)   2.  Essential hypertension   3. Obesity, unspecified classification, unspecified obesity type, unspecified whether serious comorbidity present    4. Uncontrolled diabetes mellitus with complications    5. Centrilobular emphysema           Plan:     no indication for further testing or tx at this time.     Discussed the development of diastolic dysfxn with age which pt does not have at this time by echo but is at risk for- encouraged to cont to stay active, keep bp controlled and eat low Na diet.    Pt to cont to follow with is primary cardiologist and pulmonolgist and f/u only prn

## 2019-02-04 NOTE — PROCEDURES
Kamar Muñoz is a 73 y.o.  male patient, who presents for a 6 minute walk test ordered by MD Lobito.  The diagnosis is Qualify for Oxygen.  The patient's BMI is 27.2 kg/m2.  Predicted distance (lower limit of normal) is 313.91 meters.      Test Results:    The test was completed without stopping.   The total time walked was 360 seconds.  During walking, the patient reported:  Leg pain, Dyspnea. The patient used no assistive devices  during testing.     02/04/2019---------Distance: 426.72 meters (1400 feet)     O2 Sat % Supplemental Oxygen Heart Rate Blood Pressure Harshal Scale   Pre-exercise  (Resting) 98 % Room Air 81 bpm 116/68 mmHg 0   During Exercise 94 % Room Air 98 bpm 133/64 mmHg 4   Post-exercise  (Recovery) 97 % Room Air  89 bpm   mmHg       Recovery Time: 46 seconds    Performing nurse/tech: New LOREDO      PREVIOUS STUDY:   The patient has not had a previous study.      CLINICAL INTERPRETATION:  Six minute walk distance is 426.72 meters (1400 feet) with somewhat heavy dyspnea.  During exercise, there was significant desaturation while breathing room air.  Both blood pressure and heart rate remained stable with walking.  The patient reported non-pulmonary symptoms during exercise.  No previous study performed.  Based upon age and body mass index, exercise capacity is normal.

## 2019-02-20 ENCOUNTER — NURSE TRIAGE (OUTPATIENT)
Dept: ADMINISTRATIVE | Facility: CLINIC | Age: 74
End: 2019-02-20

## 2019-03-11 RX ORDER — LOSARTAN POTASSIUM 25 MG/1
TABLET ORAL
Qty: 90 TABLET | Refills: 0 | Status: SHIPPED | OUTPATIENT
Start: 2019-03-11 | End: 2019-06-09 | Stop reason: SDUPTHER

## 2019-04-11 ENCOUNTER — TELEPHONE (OUTPATIENT)
Dept: FAMILY MEDICINE | Facility: CLINIC | Age: 74
End: 2019-04-11

## 2019-04-11 DIAGNOSIS — Z79.4 TYPE 2 DIABETES MELLITUS WITH HYPERGLYCEMIA, WITH LONG-TERM CURRENT USE OF INSULIN: Primary | ICD-10-CM

## 2019-04-11 DIAGNOSIS — E11.65 TYPE 2 DIABETES MELLITUS WITH HYPERGLYCEMIA, WITH LONG-TERM CURRENT USE OF INSULIN: Primary | ICD-10-CM

## 2019-04-11 DIAGNOSIS — I10 ESSENTIAL HYPERTENSION: ICD-10-CM

## 2019-04-11 NOTE — TELEPHONE ENCOUNTER
----- Message from Shikha Obrien sent at 4/11/2019  2:36 PM CDT -----  Contact: [phone]/self  Patient requesting lab orders be put in the system

## 2019-05-24 ENCOUNTER — LAB VISIT (OUTPATIENT)
Dept: LAB | Facility: HOSPITAL | Age: 74
End: 2019-05-24
Attending: FAMILY MEDICINE
Payer: MEDICARE

## 2019-05-24 DIAGNOSIS — I10 ESSENTIAL HYPERTENSION: ICD-10-CM

## 2019-05-24 DIAGNOSIS — E11.65 TYPE 2 DIABETES MELLITUS WITH HYPERGLYCEMIA, WITH LONG-TERM CURRENT USE OF INSULIN: ICD-10-CM

## 2019-05-24 DIAGNOSIS — Z79.4 TYPE 2 DIABETES MELLITUS WITH HYPERGLYCEMIA, WITH LONG-TERM CURRENT USE OF INSULIN: ICD-10-CM

## 2019-05-24 LAB
ALBUMIN SERPL BCP-MCNC: 3.5 G/DL (ref 3.5–5.2)
ALP SERPL-CCNC: 90 U/L (ref 55–135)
ALT SERPL W/O P-5'-P-CCNC: 11 U/L (ref 10–44)
ANION GAP SERPL CALC-SCNC: 5 MMOL/L (ref 8–16)
AST SERPL-CCNC: 17 U/L (ref 10–40)
BASOPHILS # BLD AUTO: 0.05 K/UL (ref 0–0.2)
BASOPHILS NFR BLD: 0.8 % (ref 0–1.9)
BILIRUB SERPL-MCNC: 0.7 MG/DL (ref 0.1–1)
BUN SERPL-MCNC: 24 MG/DL (ref 8–23)
CALCIUM SERPL-MCNC: 9.1 MG/DL (ref 8.7–10.5)
CHLORIDE SERPL-SCNC: 104 MMOL/L (ref 95–110)
CHOLEST SERPL-MCNC: 139 MG/DL (ref 120–199)
CHOLEST/HDLC SERPL: 2.2 {RATIO} (ref 2–5)
CO2 SERPL-SCNC: 28 MMOL/L (ref 23–29)
CREAT SERPL-MCNC: 1.1 MG/DL (ref 0.5–1.4)
DIFFERENTIAL METHOD: NORMAL
EOSINOPHIL # BLD AUTO: 0.5 K/UL (ref 0–0.5)
EOSINOPHIL NFR BLD: 7.9 % (ref 0–8)
ERYTHROCYTE [DISTWIDTH] IN BLOOD BY AUTOMATED COUNT: 12.3 % (ref 11.5–14.5)
EST. GFR  (AFRICAN AMERICAN): >60 ML/MIN/1.73 M^2
EST. GFR  (NON AFRICAN AMERICAN): >60 ML/MIN/1.73 M^2
ESTIMATED AVG GLUCOSE: 217 MG/DL (ref 68–131)
GLUCOSE SERPL-MCNC: 216 MG/DL (ref 70–110)
HBA1C MFR BLD HPLC: 9.2 % (ref 4–5.6)
HCT VFR BLD AUTO: 47 % (ref 40–54)
HDLC SERPL-MCNC: 62 MG/DL (ref 40–75)
HDLC SERPL: 44.6 % (ref 20–50)
HGB BLD-MCNC: 15.1 G/DL (ref 14–18)
IMM GRANULOCYTES # BLD AUTO: 0.01 K/UL (ref 0–0.04)
IMM GRANULOCYTES NFR BLD AUTO: 0.2 % (ref 0–0.5)
LDLC SERPL CALC-MCNC: 63.8 MG/DL (ref 63–159)
LYMPHOCYTES # BLD AUTO: 1.6 K/UL (ref 1–4.8)
LYMPHOCYTES NFR BLD: 25.2 % (ref 18–48)
MCH RBC QN AUTO: 28.3 PG (ref 27–31)
MCHC RBC AUTO-ENTMCNC: 32.1 G/DL (ref 32–36)
MCV RBC AUTO: 88 FL (ref 82–98)
MONOCYTES # BLD AUTO: 0.7 K/UL (ref 0.3–1)
MONOCYTES NFR BLD: 10.4 % (ref 4–15)
NEUTROPHILS # BLD AUTO: 3.6 K/UL (ref 1.8–7.7)
NEUTROPHILS NFR BLD: 55.5 % (ref 38–73)
NONHDLC SERPL-MCNC: 77 MG/DL
NRBC BLD-RTO: 0 /100 WBC
PLATELET # BLD AUTO: 192 K/UL (ref 150–350)
PMV BLD AUTO: 10.4 FL (ref 9.2–12.9)
POTASSIUM SERPL-SCNC: 4.7 MMOL/L (ref 3.5–5.1)
PROT SERPL-MCNC: 6.7 G/DL (ref 6–8.4)
RBC # BLD AUTO: 5.33 M/UL (ref 4.6–6.2)
SODIUM SERPL-SCNC: 137 MMOL/L (ref 136–145)
TRIGL SERPL-MCNC: 66 MG/DL (ref 30–150)
TSH SERPL DL<=0.005 MIU/L-ACNC: 0.56 UIU/ML (ref 0.4–4)
WBC # BLD AUTO: 6.46 K/UL (ref 3.9–12.7)

## 2019-05-24 PROCEDURE — 80061 LIPID PANEL: CPT | Mod: HCNC

## 2019-05-24 PROCEDURE — 80053 COMPREHEN METABOLIC PANEL: CPT | Mod: HCNC

## 2019-05-24 PROCEDURE — 83036 HEMOGLOBIN GLYCOSYLATED A1C: CPT | Mod: HCNC

## 2019-05-24 PROCEDURE — 85025 COMPLETE CBC W/AUTO DIFF WBC: CPT | Mod: HCNC

## 2019-05-24 PROCEDURE — 36415 COLL VENOUS BLD VENIPUNCTURE: CPT | Mod: HCNC,PO

## 2019-05-24 PROCEDURE — 84443 ASSAY THYROID STIM HORMONE: CPT | Mod: HCNC

## 2019-05-27 ENCOUNTER — OFFICE VISIT (OUTPATIENT)
Dept: FAMILY MEDICINE | Facility: CLINIC | Age: 74
End: 2019-05-27
Payer: MEDICARE

## 2019-05-27 VITALS
BODY MASS INDEX: 26.88 KG/M2 | WEIGHT: 209.44 LBS | SYSTOLIC BLOOD PRESSURE: 120 MMHG | HEIGHT: 74 IN | DIASTOLIC BLOOD PRESSURE: 70 MMHG | HEART RATE: 80 BPM | OXYGEN SATURATION: 97 %

## 2019-05-27 DIAGNOSIS — I10 ESSENTIAL HYPERTENSION: Primary | ICD-10-CM

## 2019-05-27 DIAGNOSIS — E11.65 TYPE 2 DIABETES MELLITUS WITH HYPERGLYCEMIA, WITH LONG-TERM CURRENT USE OF INSULIN: ICD-10-CM

## 2019-05-27 DIAGNOSIS — Z79.4 TYPE 2 DIABETES MELLITUS WITH HYPERGLYCEMIA, WITH LONG-TERM CURRENT USE OF INSULIN: ICD-10-CM

## 2019-05-27 PROCEDURE — 99499 RISK ADDL DX/OHS AUDIT: ICD-10-PCS | Mod: HCNC,S$GLB,, | Performed by: FAMILY MEDICINE

## 2019-05-27 PROCEDURE — 99214 PR OFFICE/OUTPT VISIT, EST, LEVL IV, 30-39 MIN: ICD-10-PCS | Mod: HCNC,S$GLB,, | Performed by: FAMILY MEDICINE

## 2019-05-27 PROCEDURE — 99999 PR PBB SHADOW E&M-EST. PATIENT-LVL III: CPT | Mod: PBBFAC,HCNC,, | Performed by: FAMILY MEDICINE

## 2019-05-27 PROCEDURE — 3074F SYST BP LT 130 MM HG: CPT | Mod: HCNC,CPTII,S$GLB, | Performed by: FAMILY MEDICINE

## 2019-05-27 PROCEDURE — 3074F PR MOST RECENT SYSTOLIC BLOOD PRESSURE < 130 MM HG: ICD-10-PCS | Mod: HCNC,CPTII,S$GLB, | Performed by: FAMILY MEDICINE

## 2019-05-27 PROCEDURE — 3078F DIAST BP <80 MM HG: CPT | Mod: HCNC,CPTII,S$GLB, | Performed by: FAMILY MEDICINE

## 2019-05-27 PROCEDURE — 1101F PT FALLS ASSESS-DOCD LE1/YR: CPT | Mod: HCNC,CPTII,S$GLB, | Performed by: FAMILY MEDICINE

## 2019-05-27 PROCEDURE — 3046F PR MOST RECENT HEMOGLOBIN A1C LEVEL > 9.0%: ICD-10-PCS | Mod: HCNC,CPTII,S$GLB, | Performed by: FAMILY MEDICINE

## 2019-05-27 PROCEDURE — 3078F PR MOST RECENT DIASTOLIC BLOOD PRESSURE < 80 MM HG: ICD-10-PCS | Mod: HCNC,CPTII,S$GLB, | Performed by: FAMILY MEDICINE

## 2019-05-27 PROCEDURE — 99214 OFFICE O/P EST MOD 30 MIN: CPT | Mod: HCNC,S$GLB,, | Performed by: FAMILY MEDICINE

## 2019-05-27 PROCEDURE — 3046F HEMOGLOBIN A1C LEVEL >9.0%: CPT | Mod: HCNC,CPTII,S$GLB, | Performed by: FAMILY MEDICINE

## 2019-05-27 PROCEDURE — 1101F PR PT FALLS ASSESS DOC 0-1 FALLS W/OUT INJ PAST YR: ICD-10-PCS | Mod: HCNC,CPTII,S$GLB, | Performed by: FAMILY MEDICINE

## 2019-05-27 PROCEDURE — 99999 PR PBB SHADOW E&M-EST. PATIENT-LVL III: ICD-10-PCS | Mod: PBBFAC,HCNC,, | Performed by: FAMILY MEDICINE

## 2019-05-27 PROCEDURE — 99499 UNLISTED E&M SERVICE: CPT | Mod: HCNC,S$GLB,, | Performed by: FAMILY MEDICINE

## 2019-05-27 NOTE — PROGRESS NOTES
Subjective:       Patient ID: Kamar Muñoz is a 73 y.o. male.    Chief Complaint: Follow-up (x 6 months); Diabetes; Hypertension; and Results (of labs)    73 years old male came to the clinic for diabetes check.  Last A1c was elevated.  Patient with poor compliance with diet.  He reports multiple episodes of hypoglycemia.  He reports that he is very sensitive to insulin.  Metformin on renal dose.  Patient did not want additional medicine because of financial reasons.  He prefers to continue with the current regimen and adjust the diet.  Patient did not want diabetes education or endocrinology evaluation.    Review of Systems   Constitutional: Negative.    HENT: Negative.    Eyes: Negative.    Respiratory: Negative.    Cardiovascular: Negative.  Negative for chest pain, palpitations and leg swelling.   Gastrointestinal: Negative.    Endocrine: Negative for polydipsia, polyphagia and polyuria.   Genitourinary: Negative.    Musculoskeletal: Negative.    Skin: Negative.    Neurological: Negative.    Psychiatric/Behavioral: Negative.        Objective:      Physical Exam   Constitutional: He is oriented to person, place, and time. He appears well-developed and well-nourished. No distress.   HENT:   Head: Normocephalic and atraumatic.   Right Ear: External ear normal.   Left Ear: External ear normal.   Nose: Nose normal.   Mouth/Throat: Oropharynx is clear and moist. No oropharyngeal exudate.   Eyes: Pupils are equal, round, and reactive to light. Conjunctivae and EOM are normal. Right eye exhibits no discharge. Left eye exhibits no discharge. No scleral icterus.   Neck: Normal range of motion. Neck supple. No JVD present. No tracheal deviation present. No thyromegaly present.   Cardiovascular: Normal rate, regular rhythm, normal heart sounds and intact distal pulses. Exam reveals no gallop and no friction rub.   No murmur heard.  Pulmonary/Chest: Effort normal and breath sounds normal. No stridor. No respiratory  distress. He has no wheezes. He has no rales. He exhibits no tenderness.   Abdominal: Soft. Bowel sounds are normal. He exhibits no distension and no mass. There is no tenderness. There is no rebound and no guarding.   Musculoskeletal: Normal range of motion. He exhibits no edema or tenderness.   Feet:   Right Foot:   Protective Sensation: 10 sites tested. 8 sites sensed.   Skin Integrity: Negative for ulcer, blister, skin breakdown, erythema, warmth, callus or dry skin.   Left Foot:   Protective Sensation: 10 sites tested. 7 sites sensed.   Skin Integrity: Negative for ulcer, skin breakdown, erythema, warmth, callus or dry skin.   Lymphadenopathy:     He has no cervical adenopathy.   Neurological: He is alert and oriented to person, place, and time. He has normal reflexes. He displays normal reflexes. No cranial nerve deficit. He exhibits normal muscle tone. Coordination normal.   Skin: Skin is warm and dry. No rash noted. He is not diaphoretic. No erythema. No pallor.   Psychiatric: He has a normal mood and affect. His behavior is normal. Judgment and thought content normal.   Nursing note and vitals reviewed.      Assessment:       1. Essential hypertension    2. Type 2 diabetes mellitus with hyperglycemia, with long-term current use of insulin        Plan:         Kamar was seen today for follow-up, diabetes, hypertension and results.    Diagnoses and all orders for this visit:    Essential hypertension  -     Comprehensive metabolic panel; Future  -     Hemoglobin A1c; Future  -     Lipid panel; Future    Type 2 diabetes mellitus with hyperglycemia, with long-term current use of insulin  -     Comprehensive metabolic panel; Future  -     Hemoglobin A1c; Future    Continue monitoring blood pressure at home, low sodium diet.  Continue monitoring blood sugar at home,ADA diet.   Diet and physical activity to promote weight loss.  Follow-up in 3 months .  Patient did not want any additional treatment.

## 2019-05-27 NOTE — PATIENT INSTRUCTIONS
Diabetes: Activity Tips    Being more active can help you manage your diabetes. The tips on this sheet can help you get the most from your exercise. They can also help you stay safe.  Staying Active  Its important for adults to spend less time sitting and being inactive. This is especially true if you have type 2 diabetes. When you are sitting for long periods of time, get up for short sessions of light activity every 30 minutes.  You should aim for at least 150 minutes a week of exercise or physical activity. Dont let more than 2 days go by without being active.  Benefit from briskness  Brisk activity gets your heart beating faster. This can help you increase your fitness, lose extra weight, and manage your blood sugar level. Try brisk walking. Or, if you have foot or leg problems, you can try swimming or bike riding. You can break up your exercise into chunks throughout the day. Work up to at least 30 minutes of steady, brisk exercise on most days.  Warm up and cool down  Warming up and cooling down reduce your risk of injury. They also help limit muscle soreness. Do a mild version of your activity for 5 minutes before and after your routine. You can also learn stretches that will help keep your muscles loose. Your healthcare provider may show you good ways to warm up and stretch.  Do the talk-sing test  The talk-sing test is a simple way to tell how hard youre exercising. If you can talk while exercising, youre in a safe range. If youre out of breath, slow down. If you can carry a tune, its time to  the pace. Walk up a hill. Increase the resistance on your stationary bike. Or swim faster.  What about eating?  You may be told to plan your exercise for 1 to 2 hours after a meal. In most cases, you dont need to eat while being active. If you take insulin or medicine that can cause low blood sugar, test your blood sugar before exercising. And carry a fast-acting sugar that will raise your blood sugar  level quickly. This includes glucose tablets or hard candy. Use it if you feel low blood sugar symptoms.  Safety tips  These tips can help you stay safe as you become fit:  · Exercise with a friend or carry a cell phone if you have one.  · Carry or wear identification, such as a necklace or bracelet, that says you have diabetes.  · Use the proper footwear and safety equipment for your activity.  · Drink water before, during, and after exercise.  · Dress properly for the weather.  · Dont exercise in very hot or very cold weather.  · Dont exercise if you are sick.  · If you are instructed to do so, test your blood sugar before and after you exercise. Have a small carbohydrate snack if your blood sugar is low before you start exercising.   When to stop exercising and call your healthcare provider  Stop exercising and call your healthcare provider right away if you notice any of the following:  · Pain, pressure, tightness, or heaviness in the chest  · Pain or heaviness in the neck, shoulders, back, arms, legs, or feet  · Unusual shortness of breath  · Dizziness or lightheadedness  · Unusually rapid or slow pulse  · Increased joint or muscle pain  · Nausea or vomiting  Date Last Reviewed: 5/1/2016  © 2060-9269 [employer]. [address] [place] [address], [place] [address]. All rights reserved. This information is not intended as a substitute for professional medical care. Always follow your healthcare professional's instructions.

## 2019-06-10 RX ORDER — LOSARTAN POTASSIUM 25 MG/1
TABLET ORAL
Qty: 90 TABLET | Refills: 3 | Status: SHIPPED | OUTPATIENT
Start: 2019-06-10 | End: 2020-06-07

## 2019-07-29 ENCOUNTER — PES CALL (OUTPATIENT)
Dept: ADMINISTRATIVE | Facility: CLINIC | Age: 74
End: 2019-07-29

## 2019-10-05 ENCOUNTER — NURSE TRIAGE (OUTPATIENT)
Dept: ADMINISTRATIVE | Facility: CLINIC | Age: 74
End: 2019-10-05

## 2019-10-05 DIAGNOSIS — E11.65 TYPE 2 DIABETES MELLITUS WITH HYPERGLYCEMIA, WITH LONG-TERM CURRENT USE OF INSULIN: ICD-10-CM

## 2019-10-05 DIAGNOSIS — T38.0X5A STEROID-INDUCED HYPERGLYCEMIA: ICD-10-CM

## 2019-10-05 DIAGNOSIS — Z79.4 TYPE 2 DIABETES MELLITUS WITH HYPERGLYCEMIA, WITH LONG-TERM CURRENT USE OF INSULIN: ICD-10-CM

## 2019-10-05 DIAGNOSIS — R73.9 STEROID-INDUCED HYPERGLYCEMIA: ICD-10-CM

## 2019-10-05 RX ORDER — INSULIN ASPART 100 [IU]/ML
15 INJECTION, SOLUTION INTRAVENOUS; SUBCUTANEOUS
Qty: 13.5 ML | Refills: 0 | Status: SHIPPED | OUTPATIENT
Start: 2019-10-05 | End: 2019-10-05 | Stop reason: SDUPTHER

## 2019-10-05 NOTE — TELEPHONE ENCOUNTER
"  Reason for Disposition   [1] Request for URGENT new prescription or refill of "essential" medication (i.e., likelihood of harm to patient if not taken) AND [2] triager unable to fill per unit policy    Additional Information   Negative: Drug overdose and nurse unable to answer question   Negative: Caller requesting information not related to medicine   Negative: Caller requesting a prescription for Strep throat and has a positive culture result   Negative: Rash while taking a medication or within 3 days of stopping it   Negative: Immunization reaction suspected   Negative: [1] Asthma AND [2] having symptoms of asthma (cough, wheezing, etc)   Negative: MORE THAN A DOUBLE DOSE of a prescription or over-the-counter (OTC) drug   Negative: [1] DOUBLE DOSE (an extra dose or lesser amount) of over-the-counter (OTC) drug AND [2] any symptoms (e.g., dizziness, nausea, pain, sleepiness)   Negative: [1] DOUBLE DOSE (an extra dose or lesser amount) of prescription drug AND [2] any symptoms (e.g., dizziness, nausea, pain, sleepiness)   Negative: Took another person's prescription drug   Negative: [1] DOUBLE DOSE (an extra dose or lesser amount) of prescription drug AND [2] NO symptoms (Exception: a double dose of antibiotics)   Negative: Diabetes drug error or overdose (e.g., insulin or extra dose)    Protocols used: MEDICATION QUESTION CALL-A-    Patient calling for refill on Novolog insulin pen. Spoke to on call provider, Dr. Stanley. Ok to call in 1 refill. Refill called in to [facility]. Patient informed and also advised to contact his PCP for further refills. Patient voices understanding. Please contact caller directly to discuss any further care advice.  "

## 2019-10-05 NOTE — TELEPHONE ENCOUNTER
----- Message from Anh Vuong sent at 10/4/2019  4:56 PM CDT -----  Contact: SALTY ACOSTA [[id number]]  [phone]    Patient needs prescription filled for insulin.  Sanjuana Valdez

## 2019-10-05 NOTE — TELEPHONE ENCOUNTER
----- Message from Dorcas German sent at 10/5/2019  9:56 AM CDT -----  Contact: Self [phone]  Patient is calling to get refills on her medication sent to [id number] - [place], [place] - [address] AT [facility] [address] [phone] (Phone) [phone] (Fax)         1. insulin aspart U-100 (NOVOLOG FLEXPEN U-100 INSULIN) 100 unit/mL InPn pen 13.5 mL

## 2019-10-06 RX ORDER — INSULIN ASPART 100 [IU]/ML
15 INJECTION, SOLUTION INTRAVENOUS; SUBCUTANEOUS
Qty: 13.5 ML | Refills: 3 | Status: SHIPPED | OUTPATIENT
Start: 2019-10-06 | End: 2019-11-15 | Stop reason: SDUPTHER

## 2019-10-07 DIAGNOSIS — R73.9 STEROID-INDUCED HYPERGLYCEMIA: ICD-10-CM

## 2019-10-07 DIAGNOSIS — E11.65 TYPE 2 DIABETES MELLITUS WITH HYPERGLYCEMIA, WITH LONG-TERM CURRENT USE OF INSULIN: ICD-10-CM

## 2019-10-07 DIAGNOSIS — Z79.4 TYPE 2 DIABETES MELLITUS WITH HYPERGLYCEMIA, WITH LONG-TERM CURRENT USE OF INSULIN: ICD-10-CM

## 2019-10-07 DIAGNOSIS — T38.0X5A STEROID-INDUCED HYPERGLYCEMIA: ICD-10-CM

## 2019-11-12 DIAGNOSIS — E11.65 TYPE 2 DIABETES MELLITUS WITH HYPERGLYCEMIA, WITH LONG-TERM CURRENT USE OF INSULIN: ICD-10-CM

## 2019-11-12 DIAGNOSIS — Z79.4 TYPE 2 DIABETES MELLITUS WITH HYPERGLYCEMIA, WITH LONG-TERM CURRENT USE OF INSULIN: ICD-10-CM

## 2019-11-12 DIAGNOSIS — N18.30 TYPE 2 DIABETES MELLITUS WITH STAGE 3 CHRONIC KIDNEY DISEASE, WITH LONG-TERM CURRENT USE OF INSULIN: ICD-10-CM

## 2019-11-12 DIAGNOSIS — I10 ESSENTIAL HYPERTENSION: ICD-10-CM

## 2019-11-12 DIAGNOSIS — E11.22 TYPE 2 DIABETES MELLITUS WITH STAGE 3 CHRONIC KIDNEY DISEASE, WITH LONG-TERM CURRENT USE OF INSULIN: ICD-10-CM

## 2019-11-12 DIAGNOSIS — Z79.4 TYPE 2 DIABETES MELLITUS WITH STAGE 3 CHRONIC KIDNEY DISEASE, WITH LONG-TERM CURRENT USE OF INSULIN: ICD-10-CM

## 2019-11-12 RX ORDER — DILTIAZEM HYDROCHLORIDE 120 MG/1
CAPSULE, COATED, EXTENDED RELEASE ORAL
Qty: 30 CAPSULE | Refills: 11 | Status: SHIPPED | OUTPATIENT
Start: 2019-11-12 | End: 2020-12-11 | Stop reason: SDUPTHER

## 2019-11-12 RX ORDER — METFORMIN HYDROCHLORIDE 500 MG/1
500 TABLET ORAL 2 TIMES DAILY WITH MEALS
Qty: 180 TABLET | Refills: 3 | Status: SHIPPED | OUTPATIENT
Start: 2019-11-12 | End: 2020-11-08

## 2019-11-15 DIAGNOSIS — Z79.4 TYPE 2 DIABETES MELLITUS WITH HYPERGLYCEMIA, WITH LONG-TERM CURRENT USE OF INSULIN: ICD-10-CM

## 2019-11-15 DIAGNOSIS — T38.0X5A STEROID-INDUCED HYPERGLYCEMIA: ICD-10-CM

## 2019-11-15 DIAGNOSIS — E11.65 TYPE 2 DIABETES MELLITUS WITH HYPERGLYCEMIA, WITH LONG-TERM CURRENT USE OF INSULIN: ICD-10-CM

## 2019-11-15 DIAGNOSIS — R73.9 STEROID-INDUCED HYPERGLYCEMIA: ICD-10-CM

## 2019-11-15 RX ORDER — INSULIN ASPART 100 [IU]/ML
15 INJECTION, SOLUTION INTRAVENOUS; SUBCUTANEOUS
Qty: 40.5 ML | Refills: 3 | Status: SHIPPED | OUTPATIENT
Start: 2019-11-15 | End: 2020-06-03 | Stop reason: SDUPTHER

## 2019-11-15 NOTE — TELEPHONE ENCOUNTER
----- Message from Chandni Booth sent at 11/15/2019 11:24 AM CST -----  Contact: [phone]/self  Patient is following up on a refill request for insulin aspart U-100 (NOVOLOG FLEXPEN U-100 INSULIN) 100 unit/mL (3 mL) InPn pen. Please advise.

## 2019-12-09 ENCOUNTER — PES CALL (OUTPATIENT)
Dept: ADMINISTRATIVE | Facility: CLINIC | Age: 74
End: 2019-12-09

## 2019-12-09 DIAGNOSIS — M15.9 PRIMARY OSTEOARTHRITIS INVOLVING MULTIPLE JOINTS: ICD-10-CM

## 2019-12-09 RX ORDER — CELECOXIB 200 MG/1
200 CAPSULE ORAL DAILY PRN
Qty: 30 CAPSULE | Refills: 11 | Status: SHIPPED | OUTPATIENT
Start: 2019-12-09 | End: 2020-10-02

## 2019-12-09 NOTE — TELEPHONE ENCOUNTER
----- Message from Venkat Allen sent at 12/9/2019  2:29 PM CST -----  Contact: patient  Patient called to check status of refill request for the medication listed below.    Please call [phone] to discuss today as he is going out of town shortly.    celecoxib (CELEBREX) 200 MG capsule

## 2019-12-27 ENCOUNTER — OFFICE VISIT (OUTPATIENT)
Dept: FAMILY MEDICINE | Facility: CLINIC | Age: 74
End: 2019-12-27
Payer: MEDICARE

## 2019-12-27 VITALS
SYSTOLIC BLOOD PRESSURE: 118 MMHG | BODY MASS INDEX: 25.75 KG/M2 | DIASTOLIC BLOOD PRESSURE: 76 MMHG | HEART RATE: 89 BPM | WEIGHT: 200.63 LBS | HEIGHT: 74 IN | OXYGEN SATURATION: 95 %

## 2019-12-27 DIAGNOSIS — E13.40 NEUROPATHY DUE TO SECONDARY DIABETES: ICD-10-CM

## 2019-12-27 DIAGNOSIS — Z00.00 ANNUAL PHYSICAL EXAM: Primary | ICD-10-CM

## 2019-12-27 DIAGNOSIS — I10 ESSENTIAL HYPERTENSION: ICD-10-CM

## 2019-12-27 DIAGNOSIS — E11.65 TYPE 2 DIABETES MELLITUS WITH HYPERGLYCEMIA, WITH LONG-TERM CURRENT USE OF INSULIN: ICD-10-CM

## 2019-12-27 DIAGNOSIS — Z79.4 TYPE 2 DIABETES MELLITUS WITH HYPERGLYCEMIA, WITH LONG-TERM CURRENT USE OF INSULIN: ICD-10-CM

## 2019-12-27 PROCEDURE — 3074F PR MOST RECENT SYSTOLIC BLOOD PRESSURE < 130 MM HG: ICD-10-PCS | Mod: HCNC,CPTII,S$GLB, | Performed by: FAMILY MEDICINE

## 2019-12-27 PROCEDURE — 1159F MED LIST DOCD IN RCRD: CPT | Mod: HCNC,S$GLB,, | Performed by: FAMILY MEDICINE

## 2019-12-27 PROCEDURE — 1126F AMNT PAIN NOTED NONE PRSNT: CPT | Mod: HCNC,S$GLB,, | Performed by: FAMILY MEDICINE

## 2019-12-27 PROCEDURE — 3078F PR MOST RECENT DIASTOLIC BLOOD PRESSURE < 80 MM HG: ICD-10-PCS | Mod: HCNC,CPTII,S$GLB, | Performed by: FAMILY MEDICINE

## 2019-12-27 PROCEDURE — 1101F PR PT FALLS ASSESS DOC 0-1 FALLS W/OUT INJ PAST YR: ICD-10-PCS | Mod: HCNC,CPTII,S$GLB, | Performed by: FAMILY MEDICINE

## 2019-12-27 PROCEDURE — 3074F SYST BP LT 130 MM HG: CPT | Mod: HCNC,CPTII,S$GLB, | Performed by: FAMILY MEDICINE

## 2019-12-27 PROCEDURE — 99999 PR PBB SHADOW E&M-EST. PATIENT-LVL III: ICD-10-PCS | Mod: PBBFAC,HCNC,, | Performed by: FAMILY MEDICINE

## 2019-12-27 PROCEDURE — 3078F DIAST BP <80 MM HG: CPT | Mod: HCNC,CPTII,S$GLB, | Performed by: FAMILY MEDICINE

## 2019-12-27 PROCEDURE — 99999 PR PBB SHADOW E&M-EST. PATIENT-LVL III: CPT | Mod: PBBFAC,HCNC,, | Performed by: FAMILY MEDICINE

## 2019-12-27 PROCEDURE — 1101F PT FALLS ASSESS-DOCD LE1/YR: CPT | Mod: HCNC,CPTII,S$GLB, | Performed by: FAMILY MEDICINE

## 2019-12-27 PROCEDURE — 99397 PR PREVENTIVE VISIT,EST,65 & OVER: ICD-10-PCS | Mod: HCNC,S$GLB,, | Performed by: FAMILY MEDICINE

## 2019-12-27 PROCEDURE — 99397 PER PM REEVAL EST PAT 65+ YR: CPT | Mod: HCNC,S$GLB,, | Performed by: FAMILY MEDICINE

## 2019-12-27 PROCEDURE — 1159F PR MEDICATION LIST DOCUMENTED IN MEDICAL RECORD: ICD-10-PCS | Mod: HCNC,S$GLB,, | Performed by: FAMILY MEDICINE

## 2019-12-27 PROCEDURE — 1126F PR PAIN SEVERITY QUANTIFIED, NO PAIN PRESENT: ICD-10-PCS | Mod: HCNC,S$GLB,, | Performed by: FAMILY MEDICINE

## 2019-12-27 RX ORDER — GABAPENTIN 300 MG/1
300 CAPSULE ORAL 2 TIMES DAILY
Qty: 60 CAPSULE | Refills: 11 | Status: SHIPPED | OUTPATIENT
Start: 2019-12-27 | End: 2020-01-05

## 2019-12-27 NOTE — PROGRESS NOTES
Subjective:       Patient ID: Kamar Muñoz is a 74 y.o. male.    Chief Complaint: Annual Exam    74 years old male who came to the clinic for his physical examination.  Last A1c was elevated.  No polyuria, polydipsia or polyphagia.  Patient very sensitive to insulin therapy.  Patient reports several episodes hypoglycemia before with adjustment the medicines .  Blood sugar at home in the 130s.  Blood pressure today stable.  No chest pain, palpitation, orthopnea or PND.  Patient with the stable neuropathy using gabapentin.    Review of Systems   Constitutional: Negative.    HENT: Negative.    Eyes: Negative.    Respiratory: Negative.    Cardiovascular: Negative.  Negative for chest pain, palpitations and leg swelling.   Gastrointestinal: Negative.    Endocrine: Negative for polydipsia, polyphagia and polyuria.   Genitourinary: Negative.    Musculoskeletal: Negative.    Skin: Negative.    Neurological: Negative.    Psychiatric/Behavioral: Negative.        Objective:      Physical Exam   Constitutional: He is oriented to person, place, and time. He appears well-developed and well-nourished. No distress.   HENT:   Head: Normocephalic and atraumatic.   Right Ear: External ear normal.   Left Ear: External ear normal.   Nose: Nose normal.   Mouth/Throat: Oropharynx is clear and moist. No oropharyngeal exudate.   Eyes: Pupils are equal, round, and reactive to light. Conjunctivae and EOM are normal. Right eye exhibits no discharge. Left eye exhibits no discharge. No scleral icterus.   Neck: Normal range of motion. Neck supple. No JVD present. No tracheal deviation present. No thyromegaly present.   Cardiovascular: Normal rate, regular rhythm, normal heart sounds and intact distal pulses. Exam reveals no gallop and no friction rub.   No murmur heard.  Pulmonary/Chest: Effort normal and breath sounds normal. No stridor. No respiratory distress. He has no wheezes. He has no rales. He exhibits no tenderness.   Abdominal:  Soft. Bowel sounds are normal. He exhibits no distension and no mass. There is no tenderness. There is no rebound and no guarding.   Musculoskeletal: Normal range of motion. He exhibits no edema or tenderness.   Lymphadenopathy:     He has no cervical adenopathy.   Neurological: He is alert and oriented to person, place, and time. He has normal reflexes. He displays normal reflexes. No cranial nerve deficit. He exhibits normal muscle tone. Coordination normal.   Skin: Skin is warm and dry. No rash noted. He is not diaphoretic. No erythema. No pallor.   Psychiatric: He has a normal mood and affect. His behavior is normal. Judgment and thought content normal.   Nursing note and vitals reviewed.      Assessment:       1. Annual physical exam    2. Essential hypertension    3. Type 2 diabetes mellitus with hyperglycemia, with long-term current use of insulin    4. 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    Continue monitoring blood sugar at home,ADA diet.  Continue monitoring blood pressure at home, low sodium diet.

## 2019-12-27 NOTE — PATIENT INSTRUCTIONS
Diabetes: Activity Tips    Being more active can help you manage your diabetes. The tips on this sheet can help you get the most from your exercise. They can also help you stay safe.  Staying Active  Its important for adults to spend less time sitting and being inactive. This is especially true if you have type 2 diabetes. When you are sitting for long periods of time, get up for short sessions of light activity every 30 minutes.  You should aim for at least 150 minutes a week of exercise or physical activity. Dont let more than 2 days go by without being active.  Benefit from briskness  Brisk activity gets your heart beating faster. This can help you increase your fitness, lose extra weight, and manage your blood sugar level. Try brisk walking. Or, if you have foot or leg problems, you can try swimming or bike riding. You can break up your exercise into chunks throughout the day. Work up to at least 30 minutes of steady, brisk exercise on most days.  Warm up and cool down  Warming up and cooling down reduce your risk of injury. They also help limit muscle soreness. Do a mild version of your activity for 5 minutes before and after your routine. You can also learn stretches that will help keep your muscles loose. Your healthcare provider may show you good ways to warm up and stretch.  Do the talk-sing test  The talk-sing test is a simple way to tell how hard youre exercising. If you can talk while exercising, youre in a safe range. If youre out of breath, slow down. If you can carry a tune, its time to  the pace. Walk up a hill. Increase the resistance on your stationary bike. Or swim faster.  What about eating?  You may be told to plan your exercise for 1 to 2 hours after a meal. In most cases, you dont need to eat while being active. If you take insulin or medicine that can cause low blood sugar, test your blood sugar before exercising. And carry a fast-acting sugar that will raise your blood sugar  level quickly. This includes glucose tablets or hard candy. Use it if you feel low blood sugar symptoms.  Safety tips  These tips can help you stay safe as you become fit:  · Exercise with a friend or carry a cell phone if you have one.  · Carry or wear identification, such as a necklace or bracelet, that says you have diabetes.  · Use the proper footwear and safety equipment for your activity.  · Drink water before, during, and after exercise.  · Dress properly for the weather.  · Dont exercise in very hot or very cold weather.  · Dont exercise if you are sick.  · If you are instructed to do so, test your blood sugar before and after you exercise. Have a small carbohydrate snack if your blood sugar is low before you start exercising.   When to stop exercising and call your healthcare provider  Stop exercising and call your healthcare provider right away if you notice any of the following:  · Pain, pressure, tightness, or heaviness in the chest  · Pain or heaviness in the neck, shoulders, back, arms, legs, or feet  · Unusual shortness of breath  · Dizziness or lightheadedness  · Unusually rapid or slow pulse  · Increased joint or muscle pain  · Nausea or vomiting  Date Last Reviewed: 5/1/2016  © 0193-7659 [employer]. [address] [place] [address], [place] [address]. All rights reserved. This information is not intended as a substitute for professional medical care. Always follow your healthcare professional's instructions.

## 2019-12-28 ENCOUNTER — LAB VISIT (OUTPATIENT)
Dept: LAB | Facility: HOSPITAL | Age: 74
End: 2019-12-28
Attending: FAMILY MEDICINE
Payer: MEDICARE

## 2019-12-28 DIAGNOSIS — E11.65 TYPE 2 DIABETES MELLITUS WITH HYPERGLYCEMIA, WITH LONG-TERM CURRENT USE OF INSULIN: ICD-10-CM

## 2019-12-28 DIAGNOSIS — Z79.4 TYPE 2 DIABETES MELLITUS WITH HYPERGLYCEMIA, WITH LONG-TERM CURRENT USE OF INSULIN: ICD-10-CM

## 2019-12-28 DIAGNOSIS — I10 ESSENTIAL HYPERTENSION: ICD-10-CM

## 2019-12-28 LAB
ALBUMIN SERPL BCP-MCNC: 3.5 G/DL (ref 3.5–5.2)
ALP SERPL-CCNC: 90 U/L (ref 55–135)
ALT SERPL W/O P-5'-P-CCNC: 14 U/L (ref 10–44)
ANION GAP SERPL CALC-SCNC: 9 MMOL/L (ref 8–16)
AST SERPL-CCNC: 18 U/L (ref 10–40)
BILIRUB SERPL-MCNC: 0.4 MG/DL (ref 0.1–1)
BUN SERPL-MCNC: 21 MG/DL (ref 8–23)
CALCIUM SERPL-MCNC: 8.8 MG/DL (ref 8.7–10.5)
CHLORIDE SERPL-SCNC: 105 MMOL/L (ref 95–110)
CHOLEST SERPL-MCNC: 140 MG/DL (ref 120–199)
CHOLEST/HDLC SERPL: 2.4 {RATIO} (ref 2–5)
CO2 SERPL-SCNC: 27 MMOL/L (ref 23–29)
CREAT SERPL-MCNC: 1.3 MG/DL (ref 0.5–1.4)
EST. GFR  (AFRICAN AMERICAN): >60 ML/MIN/1.73 M^2
EST. GFR  (NON AFRICAN AMERICAN): 53 ML/MIN/1.73 M^2
ESTIMATED AVG GLUCOSE: 289 MG/DL (ref 68–131)
GLUCOSE SERPL-MCNC: 270 MG/DL (ref 70–110)
HBA1C MFR BLD HPLC: 11.7 % (ref 4–5.6)
HDLC SERPL-MCNC: 58 MG/DL (ref 40–75)
HDLC SERPL: 41.4 % (ref 20–50)
LDLC SERPL CALC-MCNC: 70.2 MG/DL (ref 63–159)
NONHDLC SERPL-MCNC: 82 MG/DL
POTASSIUM SERPL-SCNC: 4.2 MMOL/L (ref 3.5–5.1)
PROT SERPL-MCNC: 6.7 G/DL (ref 6–8.4)
SODIUM SERPL-SCNC: 141 MMOL/L (ref 136–145)
TRIGL SERPL-MCNC: 59 MG/DL (ref 30–150)

## 2019-12-28 PROCEDURE — 80061 LIPID PANEL: CPT | Mod: HCNC

## 2019-12-28 PROCEDURE — 83036 HEMOGLOBIN GLYCOSYLATED A1C: CPT | Mod: HCNC

## 2019-12-28 PROCEDURE — 36415 COLL VENOUS BLD VENIPUNCTURE: CPT | Mod: HCNC,PO

## 2019-12-28 PROCEDURE — 80053 COMPREHEN METABOLIC PANEL: CPT | Mod: HCNC

## 2020-01-05 DIAGNOSIS — E13.40 NEUROPATHY DUE TO SECONDARY DIABETES: ICD-10-CM

## 2020-01-05 RX ORDER — GABAPENTIN 300 MG/1
CAPSULE ORAL
Qty: 180 CAPSULE | Refills: 3 | Status: SHIPPED | OUTPATIENT
Start: 2020-01-05 | End: 2021-01-07 | Stop reason: SDUPTHER

## 2020-01-12 NOTE — TELEPHONE ENCOUNTER
----- Message from Venkat Allen sent at 8/3/2018  9:59 AM CDT -----  Contact: Casimiro from [facility]/[phone]  Casimiro called to check the status of the physician's chart notes for the patient's diabetic shoes.    Please call and advise.   respiratory distress

## 2020-01-26 RX ORDER — PRAVASTATIN SODIUM 40 MG/1
TABLET ORAL
Qty: 90 TABLET | Refills: 3 | Status: SHIPPED | OUTPATIENT
Start: 2020-01-26 | End: 2021-01-24

## 2020-03-03 DIAGNOSIS — Z79.4 TYPE 2 DIABETES MELLITUS WITH HYPERGLYCEMIA, WITH LONG-TERM CURRENT USE OF INSULIN: ICD-10-CM

## 2020-03-03 DIAGNOSIS — E11.65 TYPE 2 DIABETES MELLITUS WITH HYPERGLYCEMIA, WITH LONG-TERM CURRENT USE OF INSULIN: ICD-10-CM

## 2020-03-03 DIAGNOSIS — T38.0X5A STEROID-INDUCED HYPERGLYCEMIA: ICD-10-CM

## 2020-03-03 DIAGNOSIS — R73.9 STEROID-INDUCED HYPERGLYCEMIA: ICD-10-CM

## 2020-03-03 RX ORDER — INSULIN GLARGINE 100 [IU]/ML
24 INJECTION, SOLUTION SUBCUTANEOUS NIGHTLY
Qty: 7.2 ML | Refills: 11 | Status: SHIPPED | OUTPATIENT
Start: 2020-03-03 | End: 2020-06-03 | Stop reason: SDUPTHER

## 2020-03-03 NOTE — TELEPHONE ENCOUNTER
----- Message from Dorcas German sent at 3/3/2020  1:52 PM CST -----  Contact: Self [phone]  Patient is calling to get refills on her medication sent to [id number] - [place], [place] - [address] [place] [facility] [address] [phone] (Phone) [phone] (Fax)    1. insulin glargine (LANTUS) 100 unit/mL injection 7.2 mL

## 2020-05-05 ENCOUNTER — PATIENT OUTREACH (OUTPATIENT)
Dept: ADMINISTRATIVE | Facility: OTHER | Age: 75
End: 2020-05-05

## 2020-05-05 ENCOUNTER — OFFICE VISIT (OUTPATIENT)
Dept: UROLOGY | Facility: CLINIC | Age: 75
End: 2020-05-05
Payer: MEDICARE

## 2020-05-05 VITALS
SYSTOLIC BLOOD PRESSURE: 139 MMHG | DIASTOLIC BLOOD PRESSURE: 80 MMHG | HEART RATE: 96 BPM | BODY MASS INDEX: 25.67 KG/M2 | WEIGHT: 200 LBS | TEMPERATURE: 99 F | HEIGHT: 74 IN

## 2020-05-05 DIAGNOSIS — N20.0 NEPHROLITHIASIS: Primary | ICD-10-CM

## 2020-05-05 DIAGNOSIS — R10.9 FLANK PAIN: ICD-10-CM

## 2020-05-05 DIAGNOSIS — R11.0 NAUSEA: ICD-10-CM

## 2020-05-05 DIAGNOSIS — N20.0 NEPHROLITHIASIS: ICD-10-CM

## 2020-05-05 PROCEDURE — 3079F PR MOST RECENT DIASTOLIC BLOOD PRESSURE 80-89 MM HG: ICD-10-PCS | Mod: HCNC,CPTII,S$GLB, | Performed by: STUDENT IN AN ORGANIZED HEALTH CARE EDUCATION/TRAINING PROGRAM

## 2020-05-05 PROCEDURE — 87086 URINE CULTURE/COLONY COUNT: CPT | Mod: HCNC

## 2020-05-05 PROCEDURE — 3075F PR MOST RECENT SYSTOLIC BLOOD PRESS GE 130-139MM HG: ICD-10-PCS | Mod: HCNC,CPTII,S$GLB, | Performed by: STUDENT IN AN ORGANIZED HEALTH CARE EDUCATION/TRAINING PROGRAM

## 2020-05-05 PROCEDURE — 3075F SYST BP GE 130 - 139MM HG: CPT | Mod: HCNC,CPTII,S$GLB, | Performed by: STUDENT IN AN ORGANIZED HEALTH CARE EDUCATION/TRAINING PROGRAM

## 2020-05-05 PROCEDURE — 3079F DIAST BP 80-89 MM HG: CPT | Mod: HCNC,CPTII,S$GLB, | Performed by: STUDENT IN AN ORGANIZED HEALTH CARE EDUCATION/TRAINING PROGRAM

## 2020-05-05 PROCEDURE — 99214 OFFICE O/P EST MOD 30 MIN: CPT | Mod: HCNC,25,S$GLB, | Performed by: STUDENT IN AN ORGANIZED HEALTH CARE EDUCATION/TRAINING PROGRAM

## 2020-05-05 PROCEDURE — 1101F PT FALLS ASSESS-DOCD LE1/YR: CPT | Mod: HCNC,CPTII,S$GLB, | Performed by: STUDENT IN AN ORGANIZED HEALTH CARE EDUCATION/TRAINING PROGRAM

## 2020-05-05 PROCEDURE — 99214 PR OFFICE/OUTPT VISIT, EST, LEVL IV, 30-39 MIN: ICD-10-PCS | Mod: HCNC,25,S$GLB, | Performed by: STUDENT IN AN ORGANIZED HEALTH CARE EDUCATION/TRAINING PROGRAM

## 2020-05-05 PROCEDURE — 1125F AMNT PAIN NOTED PAIN PRSNT: CPT | Mod: HCNC,S$GLB,, | Performed by: STUDENT IN AN ORGANIZED HEALTH CARE EDUCATION/TRAINING PROGRAM

## 2020-05-05 PROCEDURE — 1125F PR PAIN SEVERITY QUANTIFIED, PAIN PRESENT: ICD-10-PCS | Mod: HCNC,S$GLB,, | Performed by: STUDENT IN AN ORGANIZED HEALTH CARE EDUCATION/TRAINING PROGRAM

## 2020-05-05 PROCEDURE — 1159F PR MEDICATION LIST DOCUMENTED IN MEDICAL RECORD: ICD-10-PCS | Mod: HCNC,S$GLB,, | Performed by: STUDENT IN AN ORGANIZED HEALTH CARE EDUCATION/TRAINING PROGRAM

## 2020-05-05 PROCEDURE — 99999 PR PBB SHADOW E&M-EST. PATIENT-LVL III: ICD-10-PCS | Mod: PBBFAC,HCNC,, | Performed by: STUDENT IN AN ORGANIZED HEALTH CARE EDUCATION/TRAINING PROGRAM

## 2020-05-05 PROCEDURE — 1159F MED LIST DOCD IN RCRD: CPT | Mod: HCNC,S$GLB,, | Performed by: STUDENT IN AN ORGANIZED HEALTH CARE EDUCATION/TRAINING PROGRAM

## 2020-05-05 PROCEDURE — 99999 PR PBB SHADOW E&M-EST. PATIENT-LVL III: CPT | Mod: PBBFAC,HCNC,, | Performed by: STUDENT IN AN ORGANIZED HEALTH CARE EDUCATION/TRAINING PROGRAM

## 2020-05-05 PROCEDURE — 81002 PR URINALYSIS NONAUTO W/O SCOPE: ICD-10-PCS | Mod: HCNC,S$GLB,, | Performed by: STUDENT IN AN ORGANIZED HEALTH CARE EDUCATION/TRAINING PROGRAM

## 2020-05-05 PROCEDURE — 81002 URINALYSIS NONAUTO W/O SCOPE: CPT | Mod: HCNC,S$GLB,, | Performed by: STUDENT IN AN ORGANIZED HEALTH CARE EDUCATION/TRAINING PROGRAM

## 2020-05-05 PROCEDURE — 1101F PR PT FALLS ASSESS DOC 0-1 FALLS W/OUT INJ PAST YR: ICD-10-PCS | Mod: HCNC,CPTII,S$GLB, | Performed by: STUDENT IN AN ORGANIZED HEALTH CARE EDUCATION/TRAINING PROGRAM

## 2020-05-05 RX ORDER — KETOCONAZOLE 20 MG/G
CREAM TOPICAL
Status: ON HOLD | COMMUNITY
Start: 2020-02-17 | End: 2022-02-02 | Stop reason: HOSPADM

## 2020-05-05 RX ORDER — TAMSULOSIN HYDROCHLORIDE 0.4 MG/1
CAPSULE ORAL
Qty: 90 CAPSULE | Refills: 0 | Status: SHIPPED | OUTPATIENT
Start: 2020-05-05 | End: 2020-05-07

## 2020-05-05 RX ORDER — TAMSULOSIN HYDROCHLORIDE 0.4 MG/1
0.4 CAPSULE ORAL DAILY
Qty: 30 CAPSULE | Refills: 0 | Status: SHIPPED | OUTPATIENT
Start: 2020-05-05 | End: 2020-05-05

## 2020-05-05 RX ORDER — ONDANSETRON 8 MG/1
8 TABLET, ORALLY DISINTEGRATING ORAL EVERY 6 HOURS PRN
Qty: 15 TABLET | Refills: 0 | Status: SHIPPED | OUTPATIENT
Start: 2020-05-05 | End: 2021-05-26

## 2020-05-05 RX ORDER — HYDROCORTISONE 25 MG/G
CREAM TOPICAL
Status: ON HOLD | COMMUNITY
Start: 2020-02-17 | End: 2022-02-02 | Stop reason: HOSPADM

## 2020-05-05 RX ORDER — TRAMADOL HYDROCHLORIDE 50 MG/1
50 TABLET ORAL EVERY 6 HOURS PRN
Qty: 15 TABLET | Refills: 0 | Status: SHIPPED | OUTPATIENT
Start: 2020-05-05 | End: 2021-05-26

## 2020-05-05 NOTE — PROGRESS NOTES
Chart reviewed.   Requested updates from Care Everywhere.  Immunizations reconciled.   HM updated.  E-fax sent to Dr Zana Romero requesting last DM Eye exam

## 2020-05-05 NOTE — LETTER
May 5, 2020        Zana Romero MD  [address] [facility] [address]  [place] [address]             [facility] [address]  [facility] [address]  Phone: [phone]   Patient: Kamar Muñoz   MR Number: [id number]   YOB: 1945           Dear Dr. Romero:    Kamar Muñoz is a patient of Dr. Basim Guerrero (PCP) at [facility]. While reviewing his/her chart, it has come to our attention that there is an outstanding lab/procedure. Please help keep our Health Maintenance records as accurate and as up to date as possible by supplying the following:     Diabetic Eye Exam                                                                             Please fax to [facility] @ [phone].    Thank you for your assistance in our patient's healthcare.     Sincerely,     Sara Rocha LPN  Clinical Care Coordinator   [facility]

## 2020-05-05 NOTE — PROGRESS NOTES
"Subjective:       Patient ID: Kamar Muñoz is a 74 y.o. male.    Chief Complaint: flank pain/ concern for kidney stones  This is a 74 y.o.  male patient that is new to me.  The patient is self referred to me for kidney stones. He experienced right sided flank pain that started about 2 days ago. He denies gross hematuria or changes in urine color. He denies fevers/chills. He has not seen anything pass.he notes the right flank pain did improve with tramadol tablet (wife had a prescription). He has not experienced nausea.     Urologic history from [facility]-  6/2/11 - Dr. Harris - right URS/HLL/SBE/stent   8/31/16 - Dr. Daly - left URS/HLL/SBE/stent  Dr. Daly clinic note 10/24/16 - "Patient underwent 24-hour urine analysis showing no metabolic abnormalities."     LAST PSA  Lab Results   Component Value Date    PSA 0.46 06/20/2012    PSA 0.36 04/27/2012    PSA 0.37 05/02/2011    PSA 0.29 06/03/2009    PSA 0.3 04/23/2008    PSA 0.3 04/30/2007    PSA 0.4 03/31/2006    PSA 0.3 11/04/2004       Lab Results   Component Value Date    CREATININE 1.3 12/28/2019      ---  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 8/2/2012    Type II or unspecified type diabetes mellitus with neurological manifestations, uncontrolled(250.62) 3/8/2013    Urinary tract infection        Past Surgical History:   Procedure Laterality Date    BACK SURGERY      CATARACT EXTRACTION W/  INTRAOCULAR LENS IMPLANT Right     Per Dr Romero note 11/2018    COLONOSCOPY N/A 1/28/2019    Procedure: COLONOSCOPY Suprep;  Surgeon: Anh Johnson MD;  Location[facility];  Service: Endoscopy;  Laterality: N/A;    EYE SURGERY      HERNIA REPAIR      renal stones      SHOULDER OPEN ROTATOR CUFF REPAIR         Family History   Problem Relation Age of Onset    Prostate cancer Neg Hx  " "   Kidney disease Neg Hx        Social History     Tobacco Use    Smoking status: Former Smoker     Packs/day: 1.50     Years: 25.00     Pack years: 37.50     Last attempt to quit: 1983     Years since quittin.3    Smokeless tobacco: Never Used   Substance Use Topics    Alcohol use: No    Drug use: No       Current Outpatient Medications on File Prior to Visit   Medication Sig Dispense Refill    aspirin (ECOTRIN) 81 MG EC tablet Take 81 mg by mouth once daily.      celecoxib (CELEBREX) 200 MG capsule Take 1 capsule (200 mg total) by mouth daily as needed for Pain. TAKE 1 CAPSULE(200 MG) BY MOUTH EVERY DAY 30 capsule 11    diltiaZEM (CARTIA XT) 120 MG Cp24 TAKE 1 CAPSULE(120 MG) BY MOUTH EVERY DAY 30 capsule 11    ergocalciferol (ERGOCALCIFEROL) 50,000 unit Cap       gabapentin (NEURONTIN) 300 MG capsule TAKE 1 CAPSULE BY MOUTH TWICE DAILY 180 capsule 3    hydrocortisone 2.5 % cream APPLY TO GROIN RASH BID NO LONGER THAN 7 DAYS      insulin glargine (LANTUS) 100 unit/mL injection Inject 24 Units into the skin every evening. 7.2 mL 11    insulin syringe-needle U-100 (INSULIN SYRINGE MICROFINE) 0.3 mL 28 gauge x 1/2" Syrg Use with Lantus 100 each 6    ketoconazole (NIZORAL) 2 % cream APPLY TO GROIN RASH BID NO LONGER THAN 7 DAYS      losartan (COZAAR) 25 MG tablet TAKE 1 TABLET BY MOUTH DAILY 90 tablet 3    metFORMIN (GLUCOPHAGE) 500 MG tablet Take 1 tablet (500 mg total) by mouth 2 (two) times daily with meals. 180 tablet 3    MULTIVITAMIN ORAL Take 1 tablet by mouth once daily.       OMEPRAZOLE (PRILOSEC ORAL) Take 1 tablet by mouth daily as needed.       pen needle, diabetic (PEN NEEDLE) 30 gauge x 5/16" Ndle 1 Units by Misc.(Non-Drug; Combo Route) route 3 (three) times daily. 100 each 3    polycarbophil (FIBERCON) 625 mg tablet Take 1 tablet by mouth once daily.       pravastatin (PRAVACHOL) 40 MG tablet TAKE 1 TABLET BY MOUTH EVERY DAY 90 tablet 3    insulin aspart U-100 (NOVOLOG " FLEXPEN U-100 INSULIN) 100 unit/mL (3 mL) InPn pen Inject 15 Units into the skin 3 (three) times daily with meals. 40.5 mL 3    levocetirizine (XYZAL) 5 MG tablet Take 1 tablet (5 mg total) by mouth every evening. 30 tablet 0     No current facility-administered medications on file prior to visit.        Review of patient's allergies indicates:   Allergen Reactions    Iodine      Other reaction(s): swelling  Other reaction(s): Itching  Other reaction(s): Rash       Review of Systems   Constitutional: Negative for chills.   HENT: Negative for congestion.    Eyes: Negative for visual disturbance.   Respiratory: Negative for shortness of breath.    Cardiovascular: Negative for chest pain.   Gastrointestinal: Negative for abdominal distention.   Musculoskeletal: Negative for gait problem.   Skin: Negative for color change.   Neurological: Negative for dizziness.   Psychiatric/Behavioral: Negative for agitation.       Objective:      Physical Exam   Constitutional: He appears well-developed and well-nourished.   HENT:   Head: Normocephalic.   Eyes: Pupils are equal, round, and reactive to light.   Neck: Normal range of motion.   Cardiovascular: Intact distal pulses.   Pulmonary/Chest: Effort normal.   Abdominal: Soft.   Musculoskeletal: Normal range of motion.   Neurological: He is alert.   Skin: Skin is warm and dry.   Psychiatric: He has a normal mood and affect.       Assessment:       1. Nephrolithiasis    2. Flank pain    3. Nausea        Plan:       1. Will send urine for culture. POCT urinalysis benign.  2. noncontrasted CT to evaluate for any stones passing and size. Patient will receive phone call with results.  3. Cannot Rx toradol due to crossreactivity warning with celebrex. Prescribed tramadol prn pain. flomax 0.4mg daily. zofran prn nausea for medical expulsive therapy.  4. Stone strainer provided and specimen cup.  5. Continue hydration.     Nephrolithiasis  -     CT Abdomen Pelvis  Without Contrast;  Future; Expected date: 05/05/2020  -     Discontinue: tamsulosin (FLOMAX) 0.4 mg Cap; Take 1 capsule (0.4 mg total) by mouth once daily.  Dispense: 30 capsule; Refill: 0  -     Urine culture  -     POCT urine dipstick without microscope    Flank pain  -     CT Abdomen Pelvis  Without Contrast; Future; Expected date: 05/05/2020  -     Urine culture  -     traMADoL (ULTRAM) 50 mg tablet; Take 1 tablet (50 mg total) by mouth every 6 (six) hours as needed for Pain.  Dispense: 15 tablet; Refill: 0  -     POCT urine dipstick without microscope    Nausea  -     ondansetron (ZOFRAN-ODT) 8 MG TbDL; Take 1 tablet (8 mg total) by mouth every 6 (six) hours as needed (nausea).  Dispense: 15 tablet; Refill: 0  -     POCT urine dipstick without microscope

## 2020-05-05 NOTE — PATIENT INSTRUCTIONS
1. flomax - take once daily until you hear from Dr. Diaz  2. tramadol - take as needed for pain; if mild, can try tylenol  3. zofran -  if you experience nausea

## 2020-05-07 ENCOUNTER — HOSPITAL ENCOUNTER (OUTPATIENT)
Dept: RADIOLOGY | Facility: HOSPITAL | Age: 75
Discharge: HOME | End: 2020-05-07
Attending: STUDENT IN AN ORGANIZED HEALTH CARE EDUCATION/TRAINING PROGRAM
Payer: MEDICARE

## 2020-05-07 ENCOUNTER — TELEPHONE (OUTPATIENT)
Dept: UROLOGY | Facility: CLINIC | Age: 75
End: 2020-05-07

## 2020-05-07 DIAGNOSIS — N20.0 NEPHROLITHIASIS: ICD-10-CM

## 2020-05-07 DIAGNOSIS — R10.9 FLANK PAIN: ICD-10-CM

## 2020-05-07 DIAGNOSIS — N12 PYELONEPHRITIS: Primary | ICD-10-CM

## 2020-05-07 LAB — BACTERIA UR CULT: NORMAL

## 2020-05-07 PROCEDURE — 74176 CT ABDOMEN PELVIS WITHOUT CONTRAST: ICD-10-PCS | Mod: 26,HCNC,, | Performed by: RADIOLOGY

## 2020-05-07 PROCEDURE — 74176 CT ABD & PELVIS W/O CONTRAST: CPT | Mod: 26,HCNC,, | Performed by: RADIOLOGY

## 2020-05-07 PROCEDURE — 74176 CT ABD & PELVIS W/O CONTRAST: CPT | Mod: TC,HCNC

## 2020-05-07 RX ORDER — CIPROFLOXACIN 500 MG/1
500 TABLET ORAL EVERY 12 HOURS
Qty: 28 TABLET | Refills: 0 | Status: SHIPPED | OUTPATIENT
Start: 2020-05-07 | End: 2020-05-21

## 2020-05-07 RX ORDER — OXYCODONE AND ACETAMINOPHEN 5; 325 MG/1; MG/1
1 TABLET ORAL EVERY 6 HOURS PRN
Qty: 15 TABLET | Refills: 0 | Status: SHIPPED | OUTPATIENT
Start: 2020-05-07 | End: 2021-05-26

## 2020-05-07 NOTE — TELEPHONE ENCOUNTER
----- Message from Marisa Diaz MD sent at 5/7/2020  8:57 AM CDT -----  Please call patient and notify of negative urine culture. The urine culture did not grow out bacteria in the urine concerning for a urinary tract infection (UTI), therefore no antibiotics are needed.

## 2020-05-07 NOTE — TELEPHONE ENCOUNTER
----- Message from Chandni Booth sent at 5/7/2020  2:52 PM CDT -----  Contact: wife  Name of Caller wife Aimee   Reason for Visit/Symptoms kidney stone  Best Contact Number or Confirm if Mychart Preferred [phone]  Preferred Date/Time of Appointment asap   Interested in Virtual Visit (yes/no): no  Additional Information

## 2020-05-07 NOTE — PROGRESS NOTES
Ct report did not show any kidney stones that were passing. Kidney stones present bilaterally but in a location which is nonobstructing. I did note bilateral perinephric stranding which I informed the patient about. Urine culture was negative. There were no findings unique to the right kidney to explain the unilateral right sided flank pain.    Will treat for empiric pyelo with cipro   Percocet prn pain  Counseled patient to keep me updated early next week because this could be due to a non-urologic etiology.

## 2020-05-29 ENCOUNTER — TELEPHONE (OUTPATIENT)
Dept: FAMILY MEDICINE | Facility: CLINIC | Age: 75
End: 2020-05-29

## 2020-05-29 ENCOUNTER — LAB VISIT (OUTPATIENT)
Dept: LAB | Facility: HOSPITAL | Age: 75
End: 2020-05-29
Attending: FAMILY MEDICINE
Payer: MEDICARE

## 2020-05-29 LAB
ESTIMATED AVG GLUCOSE: 275 MG/DL (ref 68–131)
HBA1C MFR BLD HPLC: 11.2 % (ref 4–5.6)

## 2020-05-29 PROCEDURE — 83036 HEMOGLOBIN GLYCOSYLATED A1C: CPT | Mod: HCNC

## 2020-05-29 PROCEDURE — 36415 COLL VENOUS BLD VENIPUNCTURE: CPT | Mod: HCNC,PO

## 2020-05-29 NOTE — TELEPHONE ENCOUNTER
----- Message from Manjinder Hammonds sent at 5/29/2020 11:34 AM CDT -----  Contact: Pt [phone]  The patient is asking for a sooner appointment per Dr. Marisa Diaz. After she examined him she told him to see you because his kidney stones shouldn't be causing that pain.    Thank you

## 2020-06-03 ENCOUNTER — HOSPITAL ENCOUNTER (OUTPATIENT)
Dept: RADIOLOGY | Facility: HOSPITAL | Age: 75
Discharge: HOME | End: 2020-06-03
Attending: FAMILY MEDICINE
Payer: MEDICARE

## 2020-06-03 ENCOUNTER — OFFICE VISIT (OUTPATIENT)
Dept: FAMILY MEDICINE | Facility: CLINIC | Age: 75
End: 2020-06-03
Payer: MEDICARE

## 2020-06-03 VITALS
DIASTOLIC BLOOD PRESSURE: 60 MMHG | BODY MASS INDEX: 25.69 KG/M2 | OXYGEN SATURATION: 97 % | SYSTOLIC BLOOD PRESSURE: 120 MMHG | HEIGHT: 74 IN | HEART RATE: 75 BPM | WEIGHT: 200.19 LBS | TEMPERATURE: 97 F

## 2020-06-03 DIAGNOSIS — M54.50 ACUTE BILATERAL LOW BACK PAIN WITHOUT SCIATICA: ICD-10-CM

## 2020-06-03 DIAGNOSIS — Z79.4 TYPE 2 DIABETES MELLITUS WITH STAGE 3 CHRONIC KIDNEY DISEASE, WITH LONG-TERM CURRENT USE OF INSULIN: ICD-10-CM

## 2020-06-03 DIAGNOSIS — I10 ESSENTIAL HYPERTENSION: Primary | ICD-10-CM

## 2020-06-03 DIAGNOSIS — E11.22 TYPE 2 DIABETES MELLITUS WITH STAGE 3 CHRONIC KIDNEY DISEASE, WITH LONG-TERM CURRENT USE OF INSULIN: ICD-10-CM

## 2020-06-03 DIAGNOSIS — N18.30 TYPE 2 DIABETES MELLITUS WITH STAGE 3 CHRONIC KIDNEY DISEASE, WITH LONG-TERM CURRENT USE OF INSULIN: ICD-10-CM

## 2020-06-03 DIAGNOSIS — E11.65 TYPE 2 DIABETES MELLITUS WITH HYPERGLYCEMIA, WITH LONG-TERM CURRENT USE OF INSULIN: ICD-10-CM

## 2020-06-03 DIAGNOSIS — Z79.4 TYPE 2 DIABETES MELLITUS WITH HYPERGLYCEMIA, WITH LONG-TERM CURRENT USE OF INSULIN: ICD-10-CM

## 2020-06-03 PROCEDURE — 72100 X-RAY EXAM L-S SPINE 2/3 VWS: CPT | Mod: TC,HCNC,FY,PO

## 2020-06-03 PROCEDURE — 1159F PR MEDICATION LIST DOCUMENTED IN MEDICAL RECORD: ICD-10-PCS | Mod: HCNC,S$GLB,, | Performed by: FAMILY MEDICINE

## 2020-06-03 PROCEDURE — 1101F PR PT FALLS ASSESS DOC 0-1 FALLS W/OUT INJ PAST YR: ICD-10-PCS | Mod: HCNC,CPTII,S$GLB, | Performed by: FAMILY MEDICINE

## 2020-06-03 PROCEDURE — 99999 PR PBB SHADOW E&M-EST. PATIENT-LVL IV: CPT | Mod: PBBFAC,HCNC,, | Performed by: FAMILY MEDICINE

## 2020-06-03 PROCEDURE — 72100 XR LUMBAR SPINE AP AND LATERAL: ICD-10-PCS | Mod: 26,HCNC,, | Performed by: RADIOLOGY

## 2020-06-03 PROCEDURE — 1125F AMNT PAIN NOTED PAIN PRSNT: CPT | Mod: HCNC,S$GLB,, | Performed by: FAMILY MEDICINE

## 2020-06-03 PROCEDURE — 3078F PR MOST RECENT DIASTOLIC BLOOD PRESSURE < 80 MM HG: ICD-10-PCS | Mod: HCNC,CPTII,S$GLB, | Performed by: FAMILY MEDICINE

## 2020-06-03 PROCEDURE — 3078F DIAST BP <80 MM HG: CPT | Mod: HCNC,CPTII,S$GLB, | Performed by: FAMILY MEDICINE

## 2020-06-03 PROCEDURE — 3074F PR MOST RECENT SYSTOLIC BLOOD PRESSURE < 130 MM HG: ICD-10-PCS | Mod: HCNC,CPTII,S$GLB, | Performed by: FAMILY MEDICINE

## 2020-06-03 PROCEDURE — 99999 PR PBB SHADOW E&M-EST. PATIENT-LVL IV: ICD-10-PCS | Mod: PBBFAC,HCNC,, | Performed by: FAMILY MEDICINE

## 2020-06-03 PROCEDURE — 99499 UNLISTED E&M SERVICE: CPT | Mod: HCNC,S$GLB,, | Performed by: FAMILY MEDICINE

## 2020-06-03 PROCEDURE — 1125F PR PAIN SEVERITY QUANTIFIED, PAIN PRESENT: ICD-10-PCS | Mod: HCNC,S$GLB,, | Performed by: FAMILY MEDICINE

## 2020-06-03 PROCEDURE — 72100 X-RAY EXAM L-S SPINE 2/3 VWS: CPT | Mod: 26,HCNC,, | Performed by: RADIOLOGY

## 2020-06-03 PROCEDURE — 99214 PR OFFICE/OUTPT VISIT, EST, LEVL IV, 30-39 MIN: ICD-10-PCS | Mod: HCNC,S$GLB,, | Performed by: FAMILY MEDICINE

## 2020-06-03 PROCEDURE — 99499 RISK ADDL DX/OHS AUDIT: ICD-10-PCS | Mod: HCNC,S$GLB,, | Performed by: FAMILY MEDICINE

## 2020-06-03 PROCEDURE — 99214 OFFICE O/P EST MOD 30 MIN: CPT | Mod: HCNC,S$GLB,, | Performed by: FAMILY MEDICINE

## 2020-06-03 PROCEDURE — 1101F PT FALLS ASSESS-DOCD LE1/YR: CPT | Mod: HCNC,CPTII,S$GLB, | Performed by: FAMILY MEDICINE

## 2020-06-03 PROCEDURE — 1159F MED LIST DOCD IN RCRD: CPT | Mod: HCNC,S$GLB,, | Performed by: FAMILY MEDICINE

## 2020-06-03 PROCEDURE — 3074F SYST BP LT 130 MM HG: CPT | Mod: HCNC,CPTII,S$GLB, | Performed by: FAMILY MEDICINE

## 2020-06-03 RX ORDER — INSULIN GLARGINE 100 [IU]/ML
30 INJECTION, SOLUTION SUBCUTANEOUS NIGHTLY
Qty: 27 ML | Refills: 3 | Status: SHIPPED | OUTPATIENT
Start: 2020-06-03 | End: 2020-10-13

## 2020-06-03 RX ORDER — INSULIN ASPART 100 [IU]/ML
18 INJECTION, SOLUTION INTRAVENOUS; SUBCUTANEOUS
Qty: 48.6 ML | Refills: 3 | Status: SHIPPED | OUTPATIENT
Start: 2020-06-03 | End: 2020-09-03 | Stop reason: SDUPTHER

## 2020-06-03 RX ORDER — PEN NEEDLE, DIABETIC 30 GX3/16"
1 NEEDLE, DISPOSABLE MISCELLANEOUS 3 TIMES DAILY
Qty: 100 EACH | Refills: 3 | Status: SHIPPED | OUTPATIENT
Start: 2020-06-03 | End: 2021-10-02 | Stop reason: SDUPTHER

## 2020-06-03 NOTE — PROGRESS NOTES
Subjective:       Patient ID: Kamar Muñoz is a 74 y.o. male.    Chief Complaint: Follow-up and Hypertension    74 years old male who came to the clinic for blood pressure check.  Blood pressure today stable.  No chest pain, palpitation, orthopnea or PND.  Patient with elevated A1c.  We discussed other alternatives for diabetes regimen.  Patient states that he is not able to afford these  Medicines.  No polyuria, polydipsia or polyphagia.  Patient is willing to try increased the insulin regimen.  He reports some problems before with hypoglycemia.  Patient also with lower back pain for the last 6 weeks.  The pain is 3/10 of intensity on and off aggravated with activity and better with rest.  Pain is localized over the lumbosacral area.  Last ultrasound with evidence of kidney stones.  Urology feels that the pain is not related to the kidney stones.  Patient with decreased kidney function but stable in comparison with previous reports .    Review of Systems   Constitutional: Negative.    HENT: Negative.    Eyes: Negative.    Respiratory: Negative.    Cardiovascular: Negative.  Negative for chest pain, palpitations and leg swelling.   Gastrointestinal: Negative.    Endocrine: Negative for polydipsia, polyphagia and polyuria.   Genitourinary: Negative.    Musculoskeletal: Positive for back pain.   Skin: Negative.    Neurological: Negative.    Psychiatric/Behavioral: Negative.        Objective:      Physical Exam   Constitutional: He is oriented to person, place, and time. He appears well-developed and well-nourished. No distress.   HENT:   Head: Normocephalic and atraumatic.   Right Ear: External ear normal.   Left Ear: External ear normal.   Nose: Nose normal.   Mouth/Throat: Oropharynx is clear and moist. No oropharyngeal exudate.   Eyes: Pupils are equal, round, and reactive to light. Conjunctivae and EOM are normal. Right eye exhibits no discharge. Left eye exhibits no discharge. No scleral icterus.   Neck:  Normal range of motion. Neck supple. No JVD present. No tracheal deviation present. No thyromegaly present.   Cardiovascular: Normal rate, regular rhythm, normal heart sounds and intact distal pulses. Exam reveals no gallop and no friction rub.   No murmur heard.  Pulmonary/Chest: Effort normal and breath sounds normal. No stridor. No respiratory distress. He has no wheezes. He has no rales. He exhibits no tenderness.   Abdominal: Soft. Bowel sounds are normal. He exhibits no distension and no mass. There is no tenderness. There is no rebound and no guarding.   Musculoskeletal: He exhibits no edema.        Lumbar back: He exhibits decreased range of motion, tenderness and pain. He exhibits no spasm.   Lymphadenopathy:     He has no cervical adenopathy.   Neurological: He is alert and oriented to person, place, and time. He has normal reflexes. He displays normal reflexes. No cranial nerve deficit. He exhibits normal muscle tone. Coordination normal.   Skin: Skin is warm and dry. No rash noted. He is not diaphoretic. No erythema. No pallor.   Psychiatric: He has a normal mood and affect. His behavior is normal. Judgment and thought content normal.   Nursing note and vitals reviewed.      Assessment:       1. Essential hypertension    2. Type 2 diabetes mellitus with hyperglycemia, with long-term current use of insulin    3. Acute bilateral low back pain without sciatica    4. Type 2 diabetes mellitus with stage 3 chronic kidney disease, with long-term current use of insulin        Plan:         Kamar was seen today for follow-up and hypertension.    Diagnoses and all orders for this visit:    Essential hypertension  -     Comprehensive metabolic panel; Future    Type 2 diabetes mellitus with hyperglycemia, with long-term current use of insulin  -     insulin glargine (LANTUS) 100 unit/mL injection; Inject 30 Units into the skin every evening.  -     insulin aspart U-100 (NOVOLOG FLEXPEN U-100 INSULIN) 100 unit/mL (3  "mL) InPn pen; Inject 18 Units into the skin 3 (three) times daily with meals.  -     Comprehensive metabolic panel; Future  -     Hemoglobin A1C; Future  -     Ambulatory referral/consult to Diabetes Education; Future  -     pen needle, diabetic (PEN NEEDLE) 30 gauge x 5/16" Ndle; 1 Units by Misc.(Non-Drug; Combo Route) route 3 (three) times daily.  -     blood sugar diagnostic Strp; 1 strip by Misc.(Non-Drug; Combo Route) route 2 (two) times a day.    Acute bilateral low back pain without sciatica  -     X-Ray Lumbar Spine Ap And Lateral; Future    Type 2 diabetes mellitus with stage 3 chronic kidney disease, with long-term current use of insulin  -     pen needle, diabetic (PEN NEEDLE) 30 gauge x 5/16" Ndle; 1 Units by Misc.(Non-Drug; Combo Route) route 3 (three) times daily.    Continue monitoring blood pressure at home, low sodium diet.  Continue monitoring blood sugar at home,ADA diet.  "

## 2020-06-04 ENCOUNTER — PATIENT OUTREACH (OUTPATIENT)
Dept: ADMINISTRATIVE | Facility: OTHER | Age: 75
End: 2020-06-04

## 2020-06-04 ENCOUNTER — TELEPHONE (OUTPATIENT)
Dept: FAMILY MEDICINE | Facility: CLINIC | Age: 75
End: 2020-06-04

## 2020-06-04 NOTE — TELEPHONE ENCOUNTER
----- Message from Bruce Obando sent at 6/4/2020  9:48 AM CDT -----  Contact: [phone]/ Hattie Beltrán would like to speak with you in regards to getting patient progress report faxed over to their office. Their fax number is [phone]. Please advise.

## 2020-06-08 ENCOUNTER — CLINICAL SUPPORT (OUTPATIENT)
Dept: DIABETES | Facility: CLINIC | Age: 75
End: 2020-06-08
Payer: MEDICARE

## 2020-06-08 VITALS — WEIGHT: 200 LBS | BODY MASS INDEX: 25.68 KG/M2

## 2020-06-08 DIAGNOSIS — E11.65 TYPE 2 DIABETES MELLITUS WITH HYPERGLYCEMIA, WITH LONG-TERM CURRENT USE OF INSULIN: ICD-10-CM

## 2020-06-08 DIAGNOSIS — Z79.4 TYPE 2 DIABETES MELLITUS WITH HYPERGLYCEMIA, WITH LONG-TERM CURRENT USE OF INSULIN: ICD-10-CM

## 2020-06-08 PROCEDURE — G0108 DIAB MANAGE TRN  PER INDIV: HCPCS | Mod: HCNC,S$GLB,, | Performed by: INTERNAL MEDICINE

## 2020-06-08 PROCEDURE — G0108 PR DIAB MANAGE TRN  PER INDIV: ICD-10-PCS | Mod: HCNC,S$GLB,, | Performed by: INTERNAL MEDICINE

## 2020-06-08 PROCEDURE — 99999 PR PBB SHADOW E&M-EST. PATIENT-LVL II: ICD-10-PCS | Mod: PBBFAC,HCNC,,

## 2020-06-08 PROCEDURE — 99999 PR PBB SHADOW E&M-EST. PATIENT-LVL II: CPT | Mod: PBBFAC,HCNC,,

## 2020-06-08 NOTE — PROGRESS NOTES
Diabetes Education  Author: Brianda Cruz RN  Date: 6/8/2020  Diabetes Care Management Summary  Diabetes Education Record Assessment/Progress: Initial  Current Diabetes Risk Level: High   Last A1c:   Lab Results   Component Value Date    HGBA1C 11.2 (H) 05/29/2020     Last Visit with Diabetes Educator: : 06/08/2020  Last OPCM Referral: : Not Found   Enrolled in OPCM: No  Diabetes Type  Diabetes Type : Type II  Diabetes History  Diabetes Diagnosis: >10 years  Current Treatment: Oral Medication, Diet, Injectable, Exercise, Insulin  Health Maintenance was reviewed today with patient. Discussed with patient importance of routine eye exams, foot exams/foot care, blood work (i.e.: A1c, microalbumin, and lipid), dental visits, yearly flu vaccine, and pneumonia vaccine as indicated by PCP. Patient verbalized understanding.   Health Maintenance Topics with due status: Not Due       Topic Last Completion Date    TETANUS VACCINE 11/28/2018    Colonoscopy 01/28/2019    Lipid Panel 12/28/2019    Hemoglobin A1c 05/29/2020     Health Maintenance Due   Topic Date Due    Eye Exam  11/05/2019    Foot Exam  05/27/2020   Nutrition  Meal Planning: skipping meals, water, diet drinks, artificial sweeteners  What type of sweetener do you use?: Splenda  What type of beverages do you drink?: juice, diet soda/tea, water  Meal Plan 24 Hour Recall - Breakfast: corn flakes, milk, coffee, cranberry juice  Meal Plan 24 Hour Recall - Lunch: lasagna, soup at [employer]  Meal Plan 24 Hour Recall - Dinner: cookies  Meal Plan 24 Hour Recall - Snack: sugar free cookies, sugar free ice cream  Exercise   Exercise Type: bike riding  Intensity: Low  Frequency: 3-5 Times per week  Duration: 15 min  Current Diabetes Treatment   Current Treatment: Oral Medication, Diet, Injectable, Exercise, Insulin  Social History  Preferred Learning Method: Face to Face, Reading Materials  Primary Support: Spouse  Educational Level: Some College  Occupation: fire  [employer]  Smoking Status: Never a Smoker  Alcohol Use: Never  DDS-2 Score  ( > 3 = SIGNIFICANT DISTRESS): 1   Barriers to Change  Barriers to Change: None  Learning Challenges : None  Readiness to Learn   Readiness to Learn : Acceptance  Cultural Influences  Cultural Influences: None  Diabetes Education Assessment/Progress  Diabetes Disease Process (diabetes disease process and treatment options): Discussion, Instructed, Demonstrates Understanding/Competency(verbalizes/demonstrates), Individual Session, Written Materials Provided  Nutrition (Incorporating nutritional management into one's lifestyle): Discussion, Instructed, Demonstrates Understanding/Competency (verbalizes/demonstrates), Individual Session, Written Materials Provided, Demonstration  Physical Activity (incorporating physical activity into one's lifestyle): Discussion, Instructed, Demonstrates Understanding/Competency (verbalizes/demonstrates), Individual Session, Written Materials Provided  Medications (states correct name, dose, onset, peak, duration, side effects & timing of meds): Discussion, Instructed, Demonstrates Understanding/Competency(verbalizes/demonstrates), Individual Session, Written Materials Provided  Monitoring (monitoring blood glucose/other parameters & using results): Discussion, Instructed, Demonstrates Understanding/Competency (verbalizes/demonstrates), Individual Session, Written Materials Provided  Acute Complications (preventing, detecting, and treating acute complications): Discussion, Instructed, Demonstrates Understanding/Competency (verbalizes/demonstrates), Individual Session, Written Materials Provided  Chronic Complications (preventing, detecting, and treating chronic complications): Discussion, Demonstrates Understanding/Competency (verbalizes/demonstrates), Instructed, Individual Session, Written Materials Provided  Clinical (diabetes, other pertinent medical history, and relevant comorbidities reviewed during  visit): Discussion, Instructed, Demonstrates Understanding/Competency (verbalizes/demonstrates), Individual Session  Cognitive (knowledge of self-management skills, functional health literacy): Discussion, Instructed, Demonstrates Understanding/Competency (verbalizes/demonstrates), Individual Session  Psychosocial (emotional response to diabetes): Discussion, Instructed, Demonstrates Understanding/Competency (verbalizes/demonstrates), Individual Session  Diabetes Distress and Support Systems: Discussion, Demonstrates Understanding/Competency (verbalizes/demonstrates), Instructed, Individual Session  Behavioral (readiness for change, lifestyle practices, self-care behaviors): Discussion, Instructed, Demonstrates Understanding/Competency (verbalizes/demonstrates), Individual Session  Goals  Patient has selected/evaluated goals during today's session: Yes, selected  Healthy Eating: Set  Start Date: 06/08/20  Target Date: 08/08/20  Monitoring: Set  Start Date: 06/08/20  Target Date: 08/08/20  Diabetes Care Plan/Intervention  Education Plan/Intervention: Individual Follow-Up DSMT, Foot Exam  Diabetes Meal Plan  Restrictions: Restricted Carbohydrate  Calories: 1800  Carbohydrate Per Meal: 45-60g  Carbohydrate Per Snack : 15-20g  Instructed pt on the food groups, how to read labels and count carbs. Pt was given sample menus and meal plans as examples.Pt usually skips dinner. Pt is not eating enough at his meals. Discussed with pt that sugar free products does not mean carb free. Discussed with pt the importance of eating 3 balanced and portioned meals per day. Discussed with pt foods that he likes that he could include in his meal plan. Discussed with pt how to put his meals together. Discussed with pt the importance of portioning and measuring his foods. Discussed with pt what needed to be added to his meals to make them more balanced. Pt had good understanding of meal plan and will work on adjustments. Discussed covid 19  precautions with pt. Pt was advised to call for any problems or questions. Will fu in one month.  Today's Self-Management Care Plan was developed with the patient's input and is based on barriers identified during today's assessment.    The long and short-term goals in the care plan were written with the patient/caregiver's input. The patient has agreed to work toward these goals to improve his overall diabetes control.      The patient received a copy of today's self-management plan and verbalized understanding of the care plan, goals, and all of today's instructions.      The patient was encouraged to communicate with his physician and care team regarding his condition(s) and treatment.  I provided the patient with my contact information today and encouraged him to contact me via phone or patient portal as needed.     Education Units of Time   Time Spent: 60 min

## 2020-07-06 ENCOUNTER — TELEPHONE (OUTPATIENT)
Dept: DIABETES | Facility: CLINIC | Age: 75
End: 2020-07-06

## 2020-08-08 ENCOUNTER — NURSE TRIAGE (OUTPATIENT)
Dept: ADMINISTRATIVE | Facility: CLINIC | Age: 75
End: 2020-08-08

## 2020-08-08 ENCOUNTER — HOSPITAL ENCOUNTER (EMERGENCY)
Facility: HOSPITAL | Age: 75
Discharge: HOME | End: 2020-08-08
Attending: EMERGENCY MEDICINE
Payer: MEDICARE

## 2020-08-08 VITALS
SYSTOLIC BLOOD PRESSURE: 153 MMHG | WEIGHT: 200 LBS | RESPIRATION RATE: 18 BRPM | HEIGHT: 74 IN | TEMPERATURE: 98 F | DIASTOLIC BLOOD PRESSURE: 81 MMHG | BODY MASS INDEX: 25.67 KG/M2 | HEART RATE: 72 BPM | OXYGEN SATURATION: 97 %

## 2020-08-08 DIAGNOSIS — R73.9 HYPERGLYCEMIA: Primary | ICD-10-CM

## 2020-08-08 LAB
ALBUMIN SERPL BCP-MCNC: 3.8 G/DL (ref 3.5–5.2)
ALP SERPL-CCNC: 106 U/L (ref 55–135)
ALT SERPL W/O P-5'-P-CCNC: 18 U/L (ref 10–44)
ANION GAP SERPL CALC-SCNC: 10 MMOL/L (ref 8–16)
AST SERPL-CCNC: 16 U/L (ref 10–40)
B-OH-BUTYR BLD STRIP-SCNC: 0.2 MMOL/L (ref 0–0.5)
BACTERIA #/AREA URNS HPF: NORMAL /HPF
BASOPHILS # BLD AUTO: 0.05 K/UL (ref 0–0.2)
BASOPHILS NFR BLD: 0.6 % (ref 0–1.9)
BILIRUB SERPL-MCNC: 0.8 MG/DL (ref 0.1–1)
BILIRUB UR QL STRIP: NEGATIVE
BUN SERPL-MCNC: 30 MG/DL (ref 8–23)
CALCIUM SERPL-MCNC: 9.2 MG/DL (ref 8.7–10.5)
CHLORIDE SERPL-SCNC: 99 MMOL/L (ref 95–110)
CLARITY UR: CLEAR
CO2 SERPL-SCNC: 22 MMOL/L (ref 23–29)
COLOR UR: YELLOW
CREAT SERPL-MCNC: 1.7 MG/DL (ref 0.5–1.4)
DIFFERENTIAL METHOD: NORMAL
EOSINOPHIL # BLD AUTO: 0.3 K/UL (ref 0–0.5)
EOSINOPHIL NFR BLD: 4.3 % (ref 0–8)
ERYTHROCYTE [DISTWIDTH] IN BLOOD BY AUTOMATED COUNT: 11.8 % (ref 11.5–14.5)
EST. GFR  (AFRICAN AMERICAN): 44 ML/MIN/1.73 M^2
EST. GFR  (NON AFRICAN AMERICAN): 38 ML/MIN/1.73 M^2
GLUCOSE SERPL-MCNC: 583 MG/DL (ref 70–110)
GLUCOSE UR QL STRIP: ABNORMAL
HCT VFR BLD AUTO: 45.8 % (ref 40–54)
HGB BLD-MCNC: 15.2 G/DL (ref 14–18)
HGB UR QL STRIP: ABNORMAL
IMM GRANULOCYTES # BLD AUTO: 0.01 K/UL (ref 0–0.04)
IMM GRANULOCYTES NFR BLD AUTO: 0.1 % (ref 0–0.5)
KETONES UR QL STRIP: NEGATIVE
LEUKOCYTE ESTERASE UR QL STRIP: NEGATIVE
LYMPHOCYTES # BLD AUTO: 1.7 K/UL (ref 1–4.8)
LYMPHOCYTES NFR BLD: 21 % (ref 18–48)
MCH RBC QN AUTO: 28.7 PG (ref 27–31)
MCHC RBC AUTO-ENTMCNC: 33.2 G/DL (ref 32–36)
MCV RBC AUTO: 86 FL (ref 82–98)
MICROSCOPIC COMMENT: NORMAL
MONOCYTES # BLD AUTO: 0.7 K/UL (ref 0.3–1)
MONOCYTES NFR BLD: 8.3 % (ref 4–15)
NEUTROPHILS # BLD AUTO: 5.1 K/UL (ref 1.8–7.7)
NEUTROPHILS NFR BLD: 65.7 % (ref 38–73)
NITRITE UR QL STRIP: NEGATIVE
NRBC BLD-RTO: 0 /100 WBC
PH UR STRIP: 6 [PH] (ref 5–8)
PLATELET # BLD AUTO: 192 K/UL (ref 150–350)
PMV BLD AUTO: 9.8 FL (ref 9.2–12.9)
POCT GLUCOSE: 246 MG/DL (ref 70–110)
POCT GLUCOSE: 417 MG/DL (ref 70–110)
POCT GLUCOSE: >500 MG/DL (ref 70–110)
POTASSIUM SERPL-SCNC: 4.6 MMOL/L (ref 3.5–5.1)
PROT SERPL-MCNC: 7.1 G/DL (ref 6–8.4)
PROT UR QL STRIP: NEGATIVE
RBC # BLD AUTO: 5.3 M/UL (ref 4.6–6.2)
RBC #/AREA URNS HPF: 1 /HPF (ref 0–4)
SODIUM SERPL-SCNC: 131 MMOL/L (ref 136–145)
SP GR UR STRIP: 1.01 (ref 1–1.03)
URN SPEC COLLECT METH UR: ABNORMAL
UROBILINOGEN UR STRIP-ACNC: NEGATIVE EU/DL
WBC # BLD AUTO: 7.84 K/UL (ref 3.9–12.7)
YEAST URNS QL MICRO: NORMAL

## 2020-08-08 PROCEDURE — 82962 GLUCOSE BLOOD TEST: CPT | Mod: HCNC,91

## 2020-08-08 PROCEDURE — 25000003 PHARM REV CODE 250: Mod: HCNC | Performed by: PHYSICIAN ASSISTANT

## 2020-08-08 PROCEDURE — 81000 URINALYSIS NONAUTO W/SCOPE: CPT | Mod: HCNC

## 2020-08-08 PROCEDURE — 80053 COMPREHEN METABOLIC PANEL: CPT | Mod: HCNC

## 2020-08-08 PROCEDURE — 96361 HYDRATE IV INFUSION ADD-ON: CPT | Mod: HCNC

## 2020-08-08 PROCEDURE — 85025 COMPLETE CBC W/AUTO DIFF WBC: CPT | Mod: HCNC

## 2020-08-08 PROCEDURE — 96374 THER/PROPH/DIAG INJ IV PUSH: CPT | Mod: HCNC

## 2020-08-08 PROCEDURE — 99284 EMERGENCY DEPT VISIT MOD MDM: CPT | Mod: 25,HCNC

## 2020-08-08 PROCEDURE — 82010 KETONE BODYS QUAN: CPT | Mod: HCNC

## 2020-08-08 PROCEDURE — 25000003 PHARM REV CODE 250: Mod: HCNC | Performed by: EMERGENCY MEDICINE

## 2020-08-08 PROCEDURE — 63600175 PHARM REV CODE 636 W HCPCS: Mod: HCNC | Performed by: PHYSICIAN ASSISTANT

## 2020-08-08 RX ADMIN — SODIUM CHLORIDE 1000 ML: 0.9 INJECTION, SOLUTION INTRAVENOUS at 04:08

## 2020-08-08 RX ADMIN — INSULIN HUMAN 5 UNITS: 100 INJECTION, SOLUTION PARENTERAL at 04:08

## 2020-08-08 RX ADMIN — SODIUM CHLORIDE 1000 ML: 0.9 INJECTION, SOLUTION INTRAVENOUS at 03:08

## 2020-08-08 NOTE — ED PROVIDER NOTES
Encounter Date: 8/8/2020       History     Chief Complaint   Patient presents with    Hyperglycemia     Pt presents to ED today reports glucose of >500 this am He reports his number are fluxuating from 46->500 since receiving new novolog pen.      Kamar Muñoz, a 74 y.o. 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     He presents to the ED evaluation of elevated blood sugar at home.  States that he has been having issues with his novolog pen causing really high or really low blood sugars since receiving a new box of medications.  Attests to increased thirst and urination.  Denies fever, cough, rash.        The history is provided by the patient.     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 8/2/2012    Type II or unspecified type diabetes mellitus with neurological manifestations, uncontrolled(250.62) 3/8/2013    Urinary tract infection      Past Surgical History:   Procedure Laterality Date    BACK SURGERY      CATARACT EXTRACTION W/  INTRAOCULAR LENS IMPLANT Right     Per Dr Romero note 11/2018    COLONOSCOPY N/A 1/28/2019    Procedure: COLONOSCOPY Suprep;  Surgeon: Anh Johnson MD;  Location: [facility];  Service: Endoscopy;  Laterality: N/A;    EYE SURGERY      HERNIA REPAIR      renal stones      SHOULDER OPEN ROTATOR CUFF REPAIR       Family History   Problem Relation Age of  Onset    Prostate cancer Neg Hx     Kidney disease Neg Hx      Social History     Tobacco Use    Smoking status: Former Smoker     Packs/day: 1.50     Years: 25.00     Pack years: 37.50     Quit date: 1983     Years since quittin.6    Smokeless tobacco: Never Used   Substance Use Topics    Alcohol use: No    Drug use: No     Review of Systems   Constitutional: Negative for fever.   Respiratory: Negative for cough and shortness of breath.    Cardiovascular: Negative for chest pain.   Gastrointestinal: Negative for nausea and vomiting.   Endocrine: Positive for polydipsia and polyuria.   Skin: Negative for color change.   Allergic/Immunologic: Negative for immunocompromised state.   Neurological: Negative for weakness and numbness.   Hematological: Negative for adenopathy.   Psychiatric/Behavioral: Negative for agitation.   All other systems reviewed and are negative.      Physical Exam     Initial Vitals [20 1525]   BP Pulse Resp Temp SpO2   138/75 94 18 97.8 °F (36.6 °C) 98 %      MAP       --         Physical Exam    Nursing note and vitals reviewed.  Constitutional: He appears well-developed and well-nourished.   HENT:   Head: Normocephalic and atraumatic.   Right Ear: External ear normal.   Left Ear: External ear normal.   Nose: Nose normal.   Mouth/Throat: Oropharynx is clear and moist. Mucous membranes are dry.   Eyes: EOM are normal.   Neck: Normal range of motion. Neck supple.   Cardiovascular: Normal rate and regular rhythm.   Pulmonary/Chest: Breath sounds normal. No respiratory distress. He has no wheezes. He has no rhonchi. He has no rales.   Abdominal: Soft. Bowel sounds are normal. He exhibits no distension. There is no abdominal tenderness. There is no rebound and no guarding.   Musculoskeletal: Normal range of motion. No tenderness or edema.   Lymphadenopathy:     He has no cervical adenopathy.   Neurological: He is alert and oriented to person, place, and time. No cranial nerve  deficit.   Skin: Skin is warm and dry. Capillary refill takes less than 2 seconds. No rash and no abscess noted. No erythema.   Psychiatric: He has a normal mood and affect. Thought content normal.         ED Course   Procedures  Labs Reviewed   COMPREHENSIVE METABOLIC PANEL - Abnormal; Notable for the following components:       Result Value    Sodium 131 (*)     CO2 22 (*)     Glucose 583 (*)     BUN, Bld 30 (*)     Creatinine 1.7 (*)     eGFR if  44 (*)     eGFR if non  38 (*)     All other components within normal limits    Narrative:       GLU critical result(s) called and verbal readback obtained from   Cecilia OLIVIER  by AM1 08/08/2020 16:16   URINALYSIS - Abnormal; Notable for the following components:    Glucose, UA 3+ (*)     Occult Blood UA Trace (*)     All other components within normal limits   POCT GLUCOSE - Abnormal; Notable for the following components:    POCT Glucose >500 (*)     All other components within normal limits   POCT GLUCOSE - Abnormal; Notable for the following components:    POCT Glucose 417 (*)     All other components within normal limits   CBC W/ AUTO DIFFERENTIAL   BETA - HYDROXYBUTYRATE, SERUM   URINALYSIS MICROSCOPIC   POCT GLUCOSE MONITORING CONTINUOUS   POCT GLUCOSE MONITORING CONTINUOUS          Imaging Results    None          Medical Decision Making:   Initial Assessment:   Increased blood sugar   Differential Diagnosis:   Hyperglycemia, DKA, electrolyte abnormality   Clinical Tests:   Lab Tests: Ordered and Reviewed  The following lab test(s) were unremarkable: CBC, CMP and Urinalysis  Radiological Study: Reviewed and Ordered  ED Management:  Pt presents to ED for evaluation of elevated blood sugar levels.  CBG on intake was >500, serum was 583.  No anion gap.  2L fluids given w 5 U insulin which resulted in CBG of 246.  Pt was instructed to call pharmacy and PCP to discuss concerns of malfunctioning insulin pen.  Instructed to return with new  or worsening symptoms.  Pt verbalized understanding and agreement with plan.  Case discussed with supervising physician who agrees with plan.                                   Clinical Impression:       ICD-10-CM ICD-9-CM   1. Hyperglycemia  R73.9 790.29                                Shahida Pitts PA-C  08/08/20 1902

## 2020-08-08 NOTE — TELEPHONE ENCOUNTER
Reason for Disposition   Blood glucose > 500 mg/dL (27.8 mmol/L)    Additional Information   Negative: Unconscious or difficult to awaken   Negative: Acting confused (e.g., disoriented, slurred speech)   Negative: Very weak (e.g., can't stand)   Negative: Sounds like a life-threatening emergency to the triager   Negative: [1] Vomiting AND [2] signs of dehydration (e.g., very dry mouth, lightheaded, dark urine)   Negative: [1] Blood glucose > 240 mg/dL (13.3 mmol/L) AND [2] rapid breathing    Protocols used: [facility]    Kamar and his wife Aimee called to say his CBGs in the last several days have been 500-600, and several times unable to register.  He is not eating due to fear of his glucose becoming too high, but he is still taking his insulin.  His CBG this morning was above 500.  And he just took 20 units of Novalog, but has not had anything to eat today.  States his CBG dropped to 47 after insulin last night, which he gave himself for , did not eat, not post prandial but random CBG was 600. He is very thirsty, nauseated, is urinating a lot, mouth is dry.  No vomiting, but states he feels like he is breathing a little faster than usual. Fatigue, weakness. Last A1c 05/29/2020 was 11.2.   Per [facility] triage protocol, recommend he be seen in ED now.  He is close to [place], and wife will bring him there.  Message to Basim Guerrero MD, pcp, and Brianda Cruz RN Diabetes. Please contact caller directly with any additional care advice.

## 2020-09-03 DIAGNOSIS — Z79.4 TYPE 2 DIABETES MELLITUS WITH HYPERGLYCEMIA, WITH LONG-TERM CURRENT USE OF INSULIN: ICD-10-CM

## 2020-09-03 DIAGNOSIS — E11.65 TYPE 2 DIABETES MELLITUS WITH HYPERGLYCEMIA, WITH LONG-TERM CURRENT USE OF INSULIN: ICD-10-CM

## 2020-09-03 RX ORDER — INSULIN ASPART 100 [IU]/ML
18 INJECTION, SOLUTION INTRAVENOUS; SUBCUTANEOUS
Qty: 16.2 ML | Refills: 3 | Status: SHIPPED | OUTPATIENT
Start: 2020-09-03 | End: 2020-10-13

## 2020-09-09 ENCOUNTER — LAB VISIT (OUTPATIENT)
Dept: LAB | Facility: HOSPITAL | Age: 75
End: 2020-09-09
Attending: FAMILY MEDICINE
Payer: MEDICARE

## 2020-09-09 DIAGNOSIS — Z79.4 TYPE 2 DIABETES MELLITUS WITH HYPERGLYCEMIA, WITH LONG-TERM CURRENT USE OF INSULIN: ICD-10-CM

## 2020-09-09 DIAGNOSIS — E11.65 TYPE 2 DIABETES MELLITUS WITH HYPERGLYCEMIA, WITH LONG-TERM CURRENT USE OF INSULIN: ICD-10-CM

## 2020-09-09 DIAGNOSIS — I10 ESSENTIAL HYPERTENSION: ICD-10-CM

## 2020-09-09 LAB
ALBUMIN SERPL BCP-MCNC: 3.5 G/DL (ref 3.5–5.2)
ALP SERPL-CCNC: 90 U/L (ref 55–135)
ALT SERPL W/O P-5'-P-CCNC: 15 U/L (ref 10–44)
ANION GAP SERPL CALC-SCNC: 8 MMOL/L (ref 8–16)
AST SERPL-CCNC: 18 U/L (ref 10–40)
BILIRUB SERPL-MCNC: 0.7 MG/DL (ref 0.1–1)
BUN SERPL-MCNC: 20 MG/DL (ref 8–23)
CALCIUM SERPL-MCNC: 8.6 MG/DL (ref 8.7–10.5)
CHLORIDE SERPL-SCNC: 108 MMOL/L (ref 95–110)
CO2 SERPL-SCNC: 27 MMOL/L (ref 23–29)
CREAT SERPL-MCNC: 1 MG/DL (ref 0.5–1.4)
EST. GFR  (AFRICAN AMERICAN): >60 ML/MIN/1.73 M^2
EST. GFR  (NON AFRICAN AMERICAN): >60 ML/MIN/1.73 M^2
ESTIMATED AVG GLUCOSE: 306 MG/DL (ref 68–131)
GLUCOSE SERPL-MCNC: 72 MG/DL (ref 70–110)
HBA1C MFR BLD HPLC: 12.3 % (ref 4–5.6)
POTASSIUM SERPL-SCNC: 3.7 MMOL/L (ref 3.5–5.1)
PROT SERPL-MCNC: 6.8 G/DL (ref 6–8.4)
SODIUM SERPL-SCNC: 143 MMOL/L (ref 136–145)

## 2020-09-09 PROCEDURE — 36415 COLL VENOUS BLD VENIPUNCTURE: CPT | Mod: HCNC,PO

## 2020-09-09 PROCEDURE — 83036 HEMOGLOBIN GLYCOSYLATED A1C: CPT | Mod: HCNC

## 2020-09-09 PROCEDURE — 80053 COMPREHEN METABOLIC PANEL: CPT | Mod: HCNC

## 2020-09-10 ENCOUNTER — LAB VISIT (OUTPATIENT)
Dept: LAB | Facility: HOSPITAL | Age: 75
End: 2020-09-10
Attending: FAMILY MEDICINE
Payer: MEDICARE

## 2020-09-10 ENCOUNTER — OFFICE VISIT (OUTPATIENT)
Dept: FAMILY MEDICINE | Facility: CLINIC | Age: 75
End: 2020-09-10
Payer: MEDICARE

## 2020-09-10 VITALS
TEMPERATURE: 98 F | OXYGEN SATURATION: 96 % | HEART RATE: 76 BPM | DIASTOLIC BLOOD PRESSURE: 64 MMHG | BODY MASS INDEX: 25.56 KG/M2 | WEIGHT: 199.06 LBS | SYSTOLIC BLOOD PRESSURE: 118 MMHG

## 2020-09-10 DIAGNOSIS — M79.605 PAIN OF LEFT LOWER EXTREMITY: Primary | ICD-10-CM

## 2020-09-10 DIAGNOSIS — Z11.59 ENCOUNTER FOR HCV SCREENING TEST FOR LOW RISK PATIENT: ICD-10-CM

## 2020-09-10 DIAGNOSIS — E11.65 TYPE 2 DIABETES MELLITUS WITH HYPERGLYCEMIA, WITH LONG-TERM CURRENT USE OF INSULIN: ICD-10-CM

## 2020-09-10 DIAGNOSIS — I87.2 VENOUS INSUFFICIENCY: ICD-10-CM

## 2020-09-10 DIAGNOSIS — Z79.4 TYPE 2 DIABETES MELLITUS WITH HYPERGLYCEMIA, WITH LONG-TERM CURRENT USE OF INSULIN: ICD-10-CM

## 2020-09-10 PROCEDURE — 99999 PR PBB SHADOW E&M-EST. PATIENT-LVL V: ICD-10-PCS | Mod: PBBFAC,HCNC,, | Performed by: FAMILY MEDICINE

## 2020-09-10 PROCEDURE — 99214 OFFICE O/P EST MOD 30 MIN: CPT | Mod: HCNC,S$GLB,, | Performed by: FAMILY MEDICINE

## 2020-09-10 PROCEDURE — 3074F SYST BP LT 130 MM HG: CPT | Mod: HCNC,CPTII,S$GLB, | Performed by: FAMILY MEDICINE

## 2020-09-10 PROCEDURE — 99214 PR OFFICE/OUTPT VISIT, EST, LEVL IV, 30-39 MIN: ICD-10-PCS | Mod: HCNC,S$GLB,, | Performed by: FAMILY MEDICINE

## 2020-09-10 PROCEDURE — 36415 COLL VENOUS BLD VENIPUNCTURE: CPT | Mod: HCNC,PO

## 2020-09-10 PROCEDURE — 1125F PR PAIN SEVERITY QUANTIFIED, PAIN PRESENT: ICD-10-PCS | Mod: HCNC,S$GLB,, | Performed by: FAMILY MEDICINE

## 2020-09-10 PROCEDURE — 1101F PR PT FALLS ASSESS DOC 0-1 FALLS W/OUT INJ PAST YR: ICD-10-PCS | Mod: HCNC,CPTII,S$GLB, | Performed by: FAMILY MEDICINE

## 2020-09-10 PROCEDURE — 1125F AMNT PAIN NOTED PAIN PRSNT: CPT | Mod: HCNC,S$GLB,, | Performed by: FAMILY MEDICINE

## 2020-09-10 PROCEDURE — 3078F PR MOST RECENT DIASTOLIC BLOOD PRESSURE < 80 MM HG: ICD-10-PCS | Mod: HCNC,CPTII,S$GLB, | Performed by: FAMILY MEDICINE

## 2020-09-10 PROCEDURE — 99499 RISK ADDL DX/OHS AUDIT: ICD-10-PCS | Mod: HCNC,S$GLB,, | Performed by: FAMILY MEDICINE

## 2020-09-10 PROCEDURE — 1159F MED LIST DOCD IN RCRD: CPT | Mod: HCNC,S$GLB,, | Performed by: FAMILY MEDICINE

## 2020-09-10 PROCEDURE — 86803 HEPATITIS C AB TEST: CPT | Mod: HCNC

## 2020-09-10 PROCEDURE — 3074F PR MOST RECENT SYSTOLIC BLOOD PRESSURE < 130 MM HG: ICD-10-PCS | Mod: HCNC,CPTII,S$GLB, | Performed by: FAMILY MEDICINE

## 2020-09-10 PROCEDURE — 99999 PR PBB SHADOW E&M-EST. PATIENT-LVL V: CPT | Mod: PBBFAC,HCNC,, | Performed by: FAMILY MEDICINE

## 2020-09-10 PROCEDURE — 3078F DIAST BP <80 MM HG: CPT | Mod: HCNC,CPTII,S$GLB, | Performed by: FAMILY MEDICINE

## 2020-09-10 PROCEDURE — 1159F PR MEDICATION LIST DOCUMENTED IN MEDICAL RECORD: ICD-10-PCS | Mod: HCNC,S$GLB,, | Performed by: FAMILY MEDICINE

## 2020-09-10 PROCEDURE — 1101F PT FALLS ASSESS-DOCD LE1/YR: CPT | Mod: HCNC,CPTII,S$GLB, | Performed by: FAMILY MEDICINE

## 2020-09-10 PROCEDURE — 99499 UNLISTED E&M SERVICE: CPT | Mod: HCNC,S$GLB,, | Performed by: FAMILY MEDICINE

## 2020-09-10 NOTE — PROGRESS NOTES
Subjective:       Patient ID: Kamar Muñoz is a 75 y.o. male.    Chief Complaint: Hypertension, Follow-up, and Diabetes    75 years old male came to the clinic with left lower extremity pain for the last month.  Patient with significant venous insufficiency.  Patient is not using the compression stockings.  Last A1c was extremely elevated.  Patient was taking a lower dose of Lantus.  Patient states that the medicine is very expensive for him.  No polyuria, polydipsia polyphagia.    Review of Systems   Constitutional: Negative.    HENT: Negative.    Eyes: Negative.    Respiratory: Negative.    Cardiovascular: Negative.  Negative for chest pain, palpitations, leg swelling and claudication.   Gastrointestinal: Negative.    Endocrine: Negative for polydipsia, polyphagia and polyuria.   Genitourinary: Negative.    Musculoskeletal: Negative.    Integumentary:  Negative.   Neurological: Negative.    Psychiatric/Behavioral: Negative.          Objective:      Physical Exam  Vitals signs and nursing note reviewed.   Constitutional:       General: He is not in acute distress.     Appearance: He is well-developed. He is not diaphoretic.   HENT:      Head: Normocephalic and atraumatic.      Right Ear: External ear normal.      Left Ear: External ear normal.      Nose: Nose normal.      Mouth/Throat:      Pharynx: No oropharyngeal exudate.   Eyes:      General: No scleral icterus.        Right eye: No discharge.         Left eye: No discharge.      Conjunctiva/sclera: Conjunctivae normal.      Pupils: Pupils are equal, round, and reactive to light.   Neck:      Musculoskeletal: Normal range of motion and neck supple.      Thyroid: No thyromegaly.      Vascular: No JVD.      Trachea: No tracheal deviation.   Cardiovascular:      Rate and Rhythm: Normal rate and regular rhythm.      Heart sounds: Normal heart sounds. No murmur. No friction rub. No gallop.    Pulmonary:      Effort: Pulmonary effort is normal. No respiratory  distress.      Breath sounds: Normal breath sounds. No stridor. No wheezing or rales.   Chest:      Chest wall: No tenderness.   Abdominal:      General: Bowel sounds are normal. There is no distension.      Palpations: Abdomen is soft. There is no mass.      Tenderness: There is no abdominal tenderness. There is no guarding or rebound.   Musculoskeletal: Normal range of motion.         General: No tenderness.   Feet:      Right foot:      Protective Sensation: 10 sites tested. 10 sites sensed.      Skin integrity: No ulcer, blister, skin breakdown, erythema, warmth, callus, dry skin or fissure.      Left foot:      Protective Sensation: 10 sites tested. 10 sites sensed.      Skin integrity: No ulcer, blister, skin breakdown, erythema, warmth, callus, dry skin or fissure.   Lymphadenopathy:      Cervical: No cervical adenopathy.   Skin:     General: Skin is warm and dry.      Coloration: Skin is not pale.      Findings: No erythema or rash.   Neurological:      Mental Status: He is alert and oriented to person, place, and time.      Cranial Nerves: No cranial nerve deficit.      Motor: No abnormal muscle tone.      Coordination: Coordination normal.      Deep Tendon Reflexes: Reflexes are normal and symmetric. Reflexes normal.   Psychiatric:         Behavior: Behavior normal.         Thought Content: Thought content normal.         Judgment: Judgment normal.         Assessment:       1. Pain of left lower extremity    2. Venous insufficiency    3. Type 2 diabetes mellitus with hyperglycemia, with long-term current use of insulin    4. Encounter for HCV screening test for low risk patient        Plan:         Kamar was seen today for hypertension, follow-up and diabetes.    Diagnoses and all orders for this visit:    Pain of left lower extremity  -     US Lower Extremity Veins Left; Future    Venous insufficiency  -     US Lower Extremity Veins Left; Future    Type 2 diabetes mellitus with hyperglycemia, with  long-term current use of insulin  -     Ambulatory referral/consult to Endocrinology; Future    Encounter for HCV screening test for low risk patient  -     Hepatitis C Antibody; Future      Continue monitoring blood sugar at home,ADA diet.

## 2020-09-11 ENCOUNTER — TELEPHONE (OUTPATIENT)
Dept: ENDOCRINOLOGY | Facility: CLINIC | Age: 75
End: 2020-09-11

## 2020-09-11 LAB — HCV AB SERPL QL IA: NEGATIVE

## 2020-09-11 NOTE — TELEPHONE ENCOUNTER
----- Message from Ashley Loaiza sent at 9/11/2020  8:47 AM CDT -----  Can someone else schedule?  ----- Message -----  From: Yung Davila MA  Sent: 9/11/2020   8:17 AM CDT  To: Ashley Loaiza    I was not able to schedule this appointment.  ----- Message -----  From: Ashley Loaiza  Sent: 9/10/2020   4:01 PM CDT  To: Pamella Bhatt Staff    Patient has a referral placed by Dr. Sheth, can you assist in scheduling, thank you

## 2020-09-11 NOTE — TELEPHONE ENCOUNTER
I spoke to the wife and have set up an appointment for 09/22/2020. Patient wife has voiced understanding.

## 2020-09-16 ENCOUNTER — HOSPITAL ENCOUNTER (OUTPATIENT)
Dept: RADIOLOGY | Facility: HOSPITAL | Age: 75
Discharge: HOME | End: 2020-09-16
Attending: FAMILY MEDICINE
Payer: MEDICARE

## 2020-09-16 DIAGNOSIS — M79.605 PAIN OF LEFT LOWER EXTREMITY: ICD-10-CM

## 2020-09-16 DIAGNOSIS — I87.2 VENOUS INSUFFICIENCY: ICD-10-CM

## 2020-09-16 PROCEDURE — 93971 EXTREMITY STUDY: CPT | Mod: 26,HCNC,LT, | Performed by: RADIOLOGY

## 2020-09-16 PROCEDURE — 93971 US LOWER EXTREMITY VEINS LEFT: ICD-10-PCS | Mod: 26,HCNC,LT, | Performed by: RADIOLOGY

## 2020-09-16 PROCEDURE — 93971 EXTREMITY STUDY: CPT | Mod: TC,HCNC,LT

## 2020-09-24 ENCOUNTER — PATIENT OUTREACH (OUTPATIENT)
Dept: ADMINISTRATIVE | Facility: OTHER | Age: 75
End: 2020-09-24

## 2020-09-28 ENCOUNTER — OFFICE VISIT (OUTPATIENT)
Dept: ENDOCRINOLOGY | Facility: CLINIC | Age: 75
End: 2020-09-28
Payer: MEDICARE

## 2020-09-28 VITALS
OXYGEN SATURATION: 96 % | TEMPERATURE: 97 F | DIASTOLIC BLOOD PRESSURE: 74 MMHG | BODY MASS INDEX: 23.98 KG/M2 | WEIGHT: 186.88 LBS | SYSTOLIC BLOOD PRESSURE: 138 MMHG | HEART RATE: 93 BPM | HEIGHT: 74 IN

## 2020-09-28 DIAGNOSIS — E11.65 TYPE 2 DIABETES MELLITUS WITH HYPERGLYCEMIA, WITH LONG-TERM CURRENT USE OF INSULIN: ICD-10-CM

## 2020-09-28 DIAGNOSIS — Z79.4 TYPE 2 DIABETES MELLITUS WITH HYPERGLYCEMIA, WITH LONG-TERM CURRENT USE OF INSULIN: ICD-10-CM

## 2020-09-28 PROCEDURE — 99214 OFFICE O/P EST MOD 30 MIN: CPT | Mod: HCNC,S$GLB,, | Performed by: INTERNAL MEDICINE

## 2020-09-28 PROCEDURE — 1126F PR PAIN SEVERITY QUANTIFIED, NO PAIN PRESENT: ICD-10-PCS | Mod: HCNC,S$GLB,, | Performed by: INTERNAL MEDICINE

## 2020-09-28 PROCEDURE — 99214 PR OFFICE/OUTPT VISIT, EST, LEVL IV, 30-39 MIN: ICD-10-PCS | Mod: HCNC,S$GLB,, | Performed by: INTERNAL MEDICINE

## 2020-09-28 PROCEDURE — 99999 PR PBB SHADOW E&M-EST. PATIENT-LVL V: CPT | Mod: PBBFAC,HCNC,, | Performed by: INTERNAL MEDICINE

## 2020-09-28 PROCEDURE — 1126F AMNT PAIN NOTED NONE PRSNT: CPT | Mod: HCNC,S$GLB,, | Performed by: INTERNAL MEDICINE

## 2020-09-28 PROCEDURE — 3075F PR MOST RECENT SYSTOLIC BLOOD PRESS GE 130-139MM HG: ICD-10-PCS | Mod: HCNC,CPTII,S$GLB, | Performed by: INTERNAL MEDICINE

## 2020-09-28 PROCEDURE — 3078F DIAST BP <80 MM HG: CPT | Mod: HCNC,CPTII,S$GLB, | Performed by: INTERNAL MEDICINE

## 2020-09-28 PROCEDURE — 3075F SYST BP GE 130 - 139MM HG: CPT | Mod: HCNC,CPTII,S$GLB, | Performed by: INTERNAL MEDICINE

## 2020-09-28 PROCEDURE — 99999 PR PBB SHADOW E&M-EST. PATIENT-LVL V: ICD-10-PCS | Mod: PBBFAC,HCNC,, | Performed by: INTERNAL MEDICINE

## 2020-09-28 PROCEDURE — 1159F MED LIST DOCD IN RCRD: CPT | Mod: HCNC,S$GLB,, | Performed by: INTERNAL MEDICINE

## 2020-09-28 PROCEDURE — 1159F PR MEDICATION LIST DOCUMENTED IN MEDICAL RECORD: ICD-10-PCS | Mod: HCNC,S$GLB,, | Performed by: INTERNAL MEDICINE

## 2020-09-28 PROCEDURE — 1101F PT FALLS ASSESS-DOCD LE1/YR: CPT | Mod: HCNC,CPTII,S$GLB, | Performed by: INTERNAL MEDICINE

## 2020-09-28 PROCEDURE — 1101F PR PT FALLS ASSESS DOC 0-1 FALLS W/OUT INJ PAST YR: ICD-10-PCS | Mod: HCNC,CPTII,S$GLB, | Performed by: INTERNAL MEDICINE

## 2020-09-28 PROCEDURE — 3078F PR MOST RECENT DIASTOLIC BLOOD PRESSURE < 80 MM HG: ICD-10-PCS | Mod: HCNC,CPTII,S$GLB, | Performed by: INTERNAL MEDICINE

## 2020-09-28 RX ORDER — DICLOFENAC SODIUM 10 MG/G
GEL TOPICAL
Status: ON HOLD | COMMUNITY
Start: 2020-07-20 | End: 2022-02-02 | Stop reason: HOSPADM

## 2020-09-28 RX ORDER — GLUCAGON 1 MG
1 VIAL (EA) INJECTION
Qty: 1 EACH | Refills: 0 | Status: SHIPPED | OUTPATIENT
Start: 2020-09-28 | End: 2020-10-05 | Stop reason: SDUPTHER

## 2020-09-28 NOTE — LETTER
September 29, 2020      Basim Guerrero MD  [address] [place] [address]           [facility]  [address]  [place] [address]  Phone: [phone]  Fax: [phone]          Patient: Kamar Muñoz   MR Number: [id number]   YOB: 1945   Date of Visit: 9/28/2020       Dear Dr. Basim Guerrero:    Thank you for referring Kamar Muñoz to me for evaluation. Attached you will find relevant portions of my assessment and plan of care.    If you have questions, please do not hesitate to call me. I look forward to following Kamar Muñoz along with you.    Sincerely,    Miller Can MD    Enclosure  CC:  No Recipients    If you would like to receive this communication electronically, please contact [email or web address] or [phone] to request more information on [employer] access.    For providers and/or their staff who would like to refer a patient to [facility], please contact us through our one-stop-shop provider referral line, [facility], at [phone].    If you feel you have received this communication in error or would no longer like to receive these types of communications, please e-mail [email or web address]

## 2020-09-28 NOTE — PROGRESS NOTES
"Subjective:      Patient ID: Kamar Muñoz is a 75 y.o. male.    Chief Complaint:  Uncontrolled diabetes    History of Present Illness  75 year old male with HTN,GERD, Dyslipidemia referred here for evaluation of Diabetes mellitus type 2     Diagnosed with [id number] years ago    Current diabetes medications include:  Novolog 15-17 units   Lantus -25 units at bedtime  Metformin 500 mg twice daily      Fingersticks: Checks only " if need it", may be 1-2 times/week depending on symptoms    Fasting: Today am -50    Before Lunch:do not check  Before Supper: do not check  Bedtime:  Prior check was 4-5 days, 296         Hypoglycemia occurs:  Today Am 50   The patient experiences the following symptoms when blood glucoses are in the 70s dizziness,   visual changes.  The patient treats these hypoglycemic episodes with:   sugary items   symptoms improve within: 15 minutes or more.     The patient does not have glucagon at home.     Diabetes Education in the past - Yes        Meals:       Breakfast- cornflakes +coffee+diet juice    Lunch-  sandwich    Dinner- home cooked+ chips    Snacks- fruits    Drinks- diet coke     Activity- weights+bike     Weight has  Been decreasing lately     Any hospitalizations r/t to diabetes over last year No    Diabetes Management Status    Statin: Taking  ACE/ARB: Taking    Screening or Prevention Patient's value Goal Complete/Controlled?   HgA1C Testing and Control   Lab Results   Component Value Date    HGBA1C 12.3 (H) 09/09/2020      Annually/Less than 8% No   Lipid profile : 12/28/2019 Annually Yes   LDL control Lab Results   Component Value Date    LDLCALC 70.2 12/28/2019    Annually/Less than 100 mg/dl  Yes   Nephropathy screening Lab Results   Component Value Date    LABMICR 95.0 05/24/2019     Lab Results   Component Value Date    PROTEINUA Negative 08/08/2020    Annually Yes   Blood pressure BP Readings from Last 1 Encounters:   09/28/20 138/74    Less than 140/90 Yes   Dilated " "retinal exam : 12/02/2019 Annually Yes   Foot exam   : 09/10/2020 Annually Yes         Chronic Complications:          Microvascular -  no diabetic retinopathy.  Last evaluated by opthal  on 12/2/2019                               + proteinuria. +numbness and tingling in feet, on gabapentin- has back issues    Macrovascular -no coronary artery disease or stroke      Denies foot ulcers, deformities.  Performs foot care at home.     Steroids recently :  Received steroid shot- left hand 5-6 weeks ago, reports sugars were very elevated for that week  Review of Systems     Review of 7 systems negative except as documented above    Objective:     /74   Pulse 93   Temp 96.8 °F (36 °C)   Ht 6' 2" (1.88 m)   Wt 84.8 kg (186 lb 14.4 oz)   SpO2 96%   BMI 24.00 kg/m²   BP Readings from Last 3 Encounters:   09/28/20 138/74   09/10/20 118/64   08/08/20 (!) 153/81     Wt Readings from Last 1 Encounters:   09/28/20 0833 84.8 kg (186 lb 14.4 oz)     Body mass index is 24 kg/m².      Physical Exam  Vitals signs reviewed.   Constitutional:       Appearance: Normal appearance. He is well-developed.   HENT:      Head: Normocephalic and atraumatic.   Neck:      Thyroid: No thyromegaly.   Cardiovascular:      Rate and Rhythm: Normal rate.   Pulmonary:      Effort: Pulmonary effort is normal.      Breath sounds: Normal breath sounds.   Neurological:      Mental Status: He is alert and oriented to person, place, and time.   Psychiatric:         Behavior: Behavior normal.         Judgment: Judgment normal.                Lab Review:   Lab Results   Component Value Date    HGBA1C 12.3 (H) 09/09/2020     Lab Results   Component Value Date    CHOL 140 12/28/2019    HDL 58 12/28/2019    LDLCALC 70.2 12/28/2019    TRIG 59 12/28/2019    CHOLHDL 41.4 12/28/2019     Lab Results   Component Value Date     09/09/2020    K 3.7 09/09/2020     09/09/2020    CO2 27 09/09/2020    GLU 72 09/09/2020    BUN 20 09/09/2020    CREATININE " 1.0 09/09/2020    CALCIUM 8.6 (L) 09/09/2020    PROT 6.8 09/09/2020    ALBUMIN 3.5 09/09/2020    BILITOT 0.7 09/09/2020    ALKPHOS 90 09/09/2020    AST 18 09/09/2020    ALT 15 09/09/2020    ANIONGAP 8 09/09/2020    ESTGFRAFRICA >60.0 09/09/2020    EGFRNONAA >60.0 09/09/2020    TSH 0.556 05/24/2019     Vit D, 25-Hydroxy   Date Value Ref Range Status   05/05/2014 24 (L) 30 - 96 ng/mL Final     Comment:     Vitamin D deficiency.........<10 ng/mL                              Vitamin D insufficiency......10-29 ng/mL       Vitamin D sufficiency........> or equal to 30 ng/mL  Vitamin D toxicity............>100 ng/mL       Assessment and Plan     Type 2 diabetes mellitus with hyperglycemia, with long-term current use of insulin    75-year-old elderly male with history of long-standing type 2 diabetes complicated with microalbuminuria here for further evaluation of uncontrolled type 2 diabetes.  Most recent A1c elevated at 12.  Goal A1c is less than 7.5 with no low sugars    Had  In depth discussion regarding diabetes disease process with emphasis on risks of both hypo and hyperglycemia.   Also discussed in detail about the diabetic dietary lifestyle-with emphasis on low glycemic foods and portion control.      Unfortunately patient is not checking his sugars on a regular basis.  Discussed with patient to start checking her sugars before each meal and bedtime.  Will schedule follow-up in 1-2 weeks for review.    Patient had low fasting sugar today a.m.  Will recommend to decrease Lantus to 23 units  Will Continue novolog 15 units for breakfast and lunch,17 units for dinner.     Hypoglycemia precautions discussed. Instructed on precautions before driving.  Glucagon prescription sent to his pharmacy    Hypertension -well controlled on current regimen.    Hyperlipidemia - no recent lipid panel available. will order fasting lipid panel during his follow-up visit    Proteinuria - reviewed labs and meaning, stressed importance of  glycemic management on renal health - on losartan    Obesity - benefits of weight loss with emphasis on lowering insulin resistance reviewed, recommend following meal plan and increasing activity     Follow-up visit in 1-2 weeks

## 2020-09-29 NOTE — ASSESSMENT & PLAN NOTE
75-year-old elderly male with history of long-standing type 2 diabetes complicated with microalbuminuria here for further evaluation of uncontrolled type 2 diabetes.  Most recent A1c elevated at 12.  Goal A1c is less than 7.5 with no low sugars    Had  In depth discussion regarding diabetes disease process with emphasis on risks of both hypo and hyperglycemia.   Also discussed in detail about the diabetic dietary lifestyle-with emphasis on low glycemic foods and portion control.      Unfortunately patient is not checking his sugars on a regular basis.  Discussed with patient to start checking her sugars before each meal and bedtime.  Will schedule follow-up in 1-2 weeks for review.    Patient had low fasting sugar today a.m.  Will recommend to decrease Lantus to 23 units  Will Continue novolog 15 units for breakfast and lunch,17 units for dinner.     Hypoglycemia precautions discussed. Instructed on precautions before driving.  Glucagon prescription sent to his pharmacy    Hypertension -well controlled on current regimen.    Hyperlipidemia - no recent lipid panel available. will order fasting lipid panel during his follow-up visit    Proteinuria - reviewed labs and meaning, stressed importance of glycemic management on renal health - on losartan    Obesity - benefits of weight loss with emphasis on lowering insulin resistance reviewed, recommend following meal plan and increasing activity     Follow-up visit in 1-2 weeks

## 2020-10-05 ENCOUNTER — OFFICE VISIT (OUTPATIENT)
Dept: ENDOCRINOLOGY | Facility: CLINIC | Age: 75
End: 2020-10-05
Payer: MEDICARE

## 2020-10-05 VITALS
HEART RATE: 87 BPM | BODY MASS INDEX: 25.75 KG/M2 | DIASTOLIC BLOOD PRESSURE: 78 MMHG | OXYGEN SATURATION: 96 % | TEMPERATURE: 97 F | HEIGHT: 74 IN | SYSTOLIC BLOOD PRESSURE: 121 MMHG | WEIGHT: 200.63 LBS

## 2020-10-05 DIAGNOSIS — E11.65 TYPE 2 DIABETES MELLITUS WITH HYPERGLYCEMIA, WITH LONG-TERM CURRENT USE OF INSULIN: Primary | ICD-10-CM

## 2020-10-05 DIAGNOSIS — Z79.4 TYPE 2 DIABETES MELLITUS WITH HYPERGLYCEMIA, WITH LONG-TERM CURRENT USE OF INSULIN: Primary | ICD-10-CM

## 2020-10-05 PROCEDURE — 1159F MED LIST DOCD IN RCRD: CPT | Mod: HCNC,S$GLB,, | Performed by: INTERNAL MEDICINE

## 2020-10-05 PROCEDURE — 1101F PT FALLS ASSESS-DOCD LE1/YR: CPT | Mod: HCNC,CPTII,S$GLB, | Performed by: INTERNAL MEDICINE

## 2020-10-05 PROCEDURE — 1126F AMNT PAIN NOTED NONE PRSNT: CPT | Mod: HCNC,S$GLB,, | Performed by: INTERNAL MEDICINE

## 2020-10-05 PROCEDURE — 3074F PR MOST RECENT SYSTOLIC BLOOD PRESSURE < 130 MM HG: ICD-10-PCS | Mod: HCNC,CPTII,S$GLB, | Performed by: INTERNAL MEDICINE

## 2020-10-05 PROCEDURE — 1101F PR PT FALLS ASSESS DOC 0-1 FALLS W/OUT INJ PAST YR: ICD-10-PCS | Mod: HCNC,CPTII,S$GLB, | Performed by: INTERNAL MEDICINE

## 2020-10-05 PROCEDURE — 99214 PR OFFICE/OUTPT VISIT, EST, LEVL IV, 30-39 MIN: ICD-10-PCS | Mod: HCNC,S$GLB,, | Performed by: INTERNAL MEDICINE

## 2020-10-05 PROCEDURE — 3078F PR MOST RECENT DIASTOLIC BLOOD PRESSURE < 80 MM HG: ICD-10-PCS | Mod: HCNC,CPTII,S$GLB, | Performed by: INTERNAL MEDICINE

## 2020-10-05 PROCEDURE — 3074F SYST BP LT 130 MM HG: CPT | Mod: HCNC,CPTII,S$GLB, | Performed by: INTERNAL MEDICINE

## 2020-10-05 PROCEDURE — 3078F DIAST BP <80 MM HG: CPT | Mod: HCNC,CPTII,S$GLB, | Performed by: INTERNAL MEDICINE

## 2020-10-05 PROCEDURE — 1126F PR PAIN SEVERITY QUANTIFIED, NO PAIN PRESENT: ICD-10-PCS | Mod: HCNC,S$GLB,, | Performed by: INTERNAL MEDICINE

## 2020-10-05 PROCEDURE — 99214 OFFICE O/P EST MOD 30 MIN: CPT | Mod: HCNC,S$GLB,, | Performed by: INTERNAL MEDICINE

## 2020-10-05 PROCEDURE — 1159F PR MEDICATION LIST DOCUMENTED IN MEDICAL RECORD: ICD-10-PCS | Mod: HCNC,S$GLB,, | Performed by: INTERNAL MEDICINE

## 2020-10-05 PROCEDURE — 99999 PR PBB SHADOW E&M-EST. PATIENT-LVL V: ICD-10-PCS | Mod: PBBFAC,HCNC,, | Performed by: INTERNAL MEDICINE

## 2020-10-05 PROCEDURE — 99999 PR PBB SHADOW E&M-EST. PATIENT-LVL V: CPT | Mod: PBBFAC,HCNC,, | Performed by: INTERNAL MEDICINE

## 2020-10-05 RX ORDER — GLUCAGON 1 MG
1 VIAL (EA) INJECTION
Qty: 1 EACH | Refills: 0 | Status: ON HOLD | OUTPATIENT
Start: 2020-10-05 | End: 2022-02-02 | Stop reason: HOSPADM

## 2020-10-05 NOTE — PROGRESS NOTES
Subjective:      Patient ID: Kamar Muñoz is a 75 y.o. male.    Chief Complaint:  Follow-up      History of Present Illness  75 year old male with HTN,GERD, Dyslipidemia referred here for evaluation of Diabetes mellitus type 2      Diagnosed with [id number] years ago     Current diabetes medications include:    Metformin 500 mg twice daily  Lantus to 22 units  Novolog 15 units for breakfast and lunch,17 units for dinner.     Sugar logs reviewed in detail- patient noted with multiple fasting lows and elevated preprandial sugars.            On detail interviewing patient over corrects fasting lows with Debbies cakes which is causing elevated preprandial sugars later in the day.       Hypoglycemia occurs:  MULTIPLE FASTING LOWS   The patient experiences the following symptoms when blood glucoses are in the 70s dizziness,   visual changes.  The patient treats these hypoglycemic episodes with:   sugary items   symptoms improve within: 15 minutes or more.     The patient does not have glucagon at home.  Was prescribed during the last visit however did not get it.     Diabetes Education in the past - Yes        Meals:       Breakfast- cornflakes +coffee+diet juice+Essie cakes    Lunch-  sandwich/spagghetti/red beans and rice/hamburger    Dinner- home cooked+ chips    Snacks- apple pie sugar free at bedtime    Drinks- diet coke     Activity- weights+bike     Weight has  Been decreasing lately     Any hospitalizations r/t to diabetes over last year No    Chronic Complications:          Microvascular -  no diabetic retinopathy.  Last evaluated by opthal  on 12/2/2019                               + proteinuria. +numbness and tingling in feet, on gabapentin- has back issues    Macrovascular -no coronary artery disease or stroke       Denies foot ulcers, deformities.  Performs foot care at home.     Steroids recently :  Received steroid shot- left hand 5-6 weeks ago, reports sugars were very elevated for that  "week  Review of Systems  Diabetes Management Status    Statin: Taking  ACE/ARB: Taking    Screening or Prevention Patient's value Goal Complete/Controlled?   HgA1C Testing and Control   Lab Results   Component Value Date    HGBA1C 12.3 (H) 09/09/2020      Annually/Less than 8% No   Lipid profile : 12/28/2019 Annually Yes   LDL control Lab Results   Component Value Date    LDLCALC 70.2 12/28/2019    Annually/Less than 100 mg/dl  Yes   Nephropathy screening Lab Results   Component Value Date    LABMICR 95.0 05/24/2019     Lab Results   Component Value Date    PROTEINUA Negative 08/08/2020    Annually Yes   Blood pressure BP Readings from Last 1 Encounters:   10/05/20 121/78    Less than 140/90 Yes   Dilated retinal exam : 12/02/2019 Annually Yes   Foot exam   : 09/10/2020 Annually Yes       Review of Systems   Review of 7 systems negative except as documented above    Objective:     /78 (BP Location: Right arm, Patient Position: Sitting, BP Method: Large (Automatic))   Pulse 87   Temp 97.2 °F (36.2 °C) (Temporal)   Ht 6' 2" (1.88 m)   Wt 91 kg (200 lb 9.6 oz)   SpO2 96%   BMI 25.76 kg/m²   BP Readings from Last 3 Encounters:   10/05/20 121/78   09/28/20 138/74   09/10/20 118/64     Wt Readings from Last 1 Encounters:   10/05/20 1030 91 kg (200 lb 9.6 oz)     Body mass index is 25.76 kg/m².      Physical Exam  Vitals signs reviewed.   Constitutional:       Appearance: Normal appearance. He is well-developed.   HENT:      Head: Normocephalic and atraumatic.   Cardiovascular:      Rate and Rhythm: Normal rate.   Pulmonary:      Effort: Pulmonary effort is normal.      Breath sounds: Normal breath sounds.   Neurological:      Mental Status: He is alert and oriented to person, place, and time.   Psychiatric:         Judgment: Judgment normal.                Lab Review:   Lab Results   Component Value Date    HGBA1C 12.3 (H) 09/09/2020     Lab Results   Component Value Date    CHOL 140 12/28/2019    HDL 58 " 12/28/2019    LDLCALC 70.2 12/28/2019    TRIG 59 12/28/2019    CHOLHDL 41.4 12/28/2019     Lab Results   Component Value Date     09/09/2020    K 3.7 09/09/2020     09/09/2020    CO2 27 09/09/2020    GLU 72 09/09/2020    BUN 20 09/09/2020    CREATININE 1.0 09/09/2020    CALCIUM 8.6 (L) 09/09/2020    PROT 6.8 09/09/2020    ALBUMIN 3.5 09/09/2020    BILITOT 0.7 09/09/2020    ALKPHOS 90 09/09/2020    AST 18 09/09/2020    ALT 15 09/09/2020    ANIONGAP 8 09/09/2020    ESTGFRAFRICA >60.0 09/09/2020    EGFRNONAA >60.0 09/09/2020    TSH 0.556 05/24/2019     Vit D, 25-Hydroxy   Date Value Ref Range Status   05/05/2014 24 (L) 30 - 96 ng/mL Final     Comment:     Vitamin D deficiency.........<10 ng/mL                              Vitamin D insufficiency......10-29 ng/mL       Vitamin D sufficiency........> or equal to 30 ng/mL  Vitamin D toxicity............>100 ng/mL       Assessment and Plan     Type 2 diabetes mellitus with hyperglycemia, with long-term current use of insulin  75-year-old elderly male with history of long-standing type 2 diabetes complicated with microalbuminuria here for further evaluation of uncontrolled type 2 diabetes.  Most recent A1c elevated at 12.  Goal A1c is less than 7.5 with no low sugars     Had  In depth discussion regarding diabetes disease process with emphasis on risks of both hypo and hyperglycemia.   Also discussed in detail about the diabetic dietary lifestyle-with emphasis on low glycemic foods and portion control.      Sugar logs reviewed in detail-multiple fasting lows which are inappropriately overly corrected with Debbies cakes leading to elevated preprandial lunch and dinner sugars.    Discussed with patient in detail about proper correction of hypoglycemia.  Recommended to use 15 g glucose tablets/orange juice.Hypoglycemia precautions discussed. Instructed on precautions before driving.  Glucagon prescription sent to his pharmacy.    In view of his fasting lows will  decrease Lantus to 20 units  Will continue on NovoLog 15 units for breakfast and lunch,17 units for dinner.    Patient was recommended to check his sugars before each meal and bedtime   Will schedule follow-up in 1-2 weeks for review.      Hypertension -well controlled on current regimen.     Hyperlipidemia - no recent lipid panel available. will order fasting lipid panel.     Proteinuria - reviewed labs and meaning, stressed importance of glycemic management on renal health - on losartan     Obesity - benefits of weight loss with emphasis on lowering insulin resistance reviewed, recommend following meal plan and increasing activity     Follow-up visit in 1-2 weeks

## 2020-10-05 NOTE — ASSESSMENT & PLAN NOTE
75-year-old elderly male with history of long-standing type 2 diabetes complicated with microalbuminuria here for further evaluation of uncontrolled type 2 diabetes.  Most recent A1c elevated at 12.  Goal A1c is less than 7.5 with no low sugars     Had  In depth discussion regarding diabetes disease process with emphasis on risks of both hypo and hyperglycemia.   Also discussed in detail about the diabetic dietary lifestyle-with emphasis on low glycemic foods and portion control.      Sugar logs reviewed in detail-multiple fasting lows which are inappropriately overly corrected with Debbies cakes leading to elevated preprandial lunch and dinner sugars.    Discussed with patient in detail about proper correction of hypoglycemia.  Recommended to use 15 g glucose tablets/orange juice.Hypoglycemia precautions discussed. Instructed on precautions before driving.  Glucagon prescription sent to his pharmacy.    In view of his fasting lows will decrease Lantus to 20 units  Will continue on NovoLog 15 units for breakfast and lunch,17 units for dinner.    Patient was recommended to check his sugars before each meal and bedtime   Will schedule follow-up in 1-2 weeks for review.      Hypertension -well controlled on current regimen.     Hyperlipidemia - no recent lipid panel available. will order fasting lipid panel.     Proteinuria - reviewed labs and meaning, stressed importance of glycemic management on renal health - on losartan     Obesity - benefits of weight loss with emphasis on lowering insulin resistance reviewed, recommend following meal plan and increasing activity     Follow-up visit in 1-2 weeks

## 2020-10-13 ENCOUNTER — OFFICE VISIT (OUTPATIENT)
Dept: ENDOCRINOLOGY | Facility: CLINIC | Age: 75
End: 2020-10-13
Payer: MEDICARE

## 2020-10-13 VITALS
OXYGEN SATURATION: 96 % | SYSTOLIC BLOOD PRESSURE: 139 MMHG | WEIGHT: 198.69 LBS | BODY MASS INDEX: 25.5 KG/M2 | TEMPERATURE: 97 F | HEART RATE: 83 BPM | DIASTOLIC BLOOD PRESSURE: 73 MMHG | HEIGHT: 74 IN

## 2020-10-13 DIAGNOSIS — E11.65 TYPE 2 DIABETES MELLITUS WITH HYPERGLYCEMIA, WITH LONG-TERM CURRENT USE OF INSULIN: Primary | ICD-10-CM

## 2020-10-13 DIAGNOSIS — Z79.4 TYPE 2 DIABETES MELLITUS WITH HYPERGLYCEMIA, WITH LONG-TERM CURRENT USE OF INSULIN: Primary | ICD-10-CM

## 2020-10-13 PROCEDURE — 1159F MED LIST DOCD IN RCRD: CPT | Mod: HCNC,S$GLB,, | Performed by: INTERNAL MEDICINE

## 2020-10-13 PROCEDURE — 1101F PR PT FALLS ASSESS DOC 0-1 FALLS W/OUT INJ PAST YR: ICD-10-PCS | Mod: HCNC,CPTII,S$GLB, | Performed by: INTERNAL MEDICINE

## 2020-10-13 PROCEDURE — 99999 PR PBB SHADOW E&M-EST. PATIENT-LVL V: CPT | Mod: PBBFAC,HCNC,, | Performed by: INTERNAL MEDICINE

## 2020-10-13 PROCEDURE — 3075F PR MOST RECENT SYSTOLIC BLOOD PRESS GE 130-139MM HG: ICD-10-PCS | Mod: HCNC,CPTII,S$GLB, | Performed by: INTERNAL MEDICINE

## 2020-10-13 PROCEDURE — 99214 OFFICE O/P EST MOD 30 MIN: CPT | Mod: HCNC,S$GLB,, | Performed by: INTERNAL MEDICINE

## 2020-10-13 PROCEDURE — 3078F DIAST BP <80 MM HG: CPT | Mod: HCNC,CPTII,S$GLB, | Performed by: INTERNAL MEDICINE

## 2020-10-13 PROCEDURE — 1159F PR MEDICATION LIST DOCUMENTED IN MEDICAL RECORD: ICD-10-PCS | Mod: HCNC,S$GLB,, | Performed by: INTERNAL MEDICINE

## 2020-10-13 PROCEDURE — 99214 PR OFFICE/OUTPT VISIT, EST, LEVL IV, 30-39 MIN: ICD-10-PCS | Mod: HCNC,S$GLB,, | Performed by: INTERNAL MEDICINE

## 2020-10-13 PROCEDURE — 1126F PR PAIN SEVERITY QUANTIFIED, NO PAIN PRESENT: ICD-10-PCS | Mod: HCNC,S$GLB,, | Performed by: INTERNAL MEDICINE

## 2020-10-13 PROCEDURE — 99999 PR PBB SHADOW E&M-EST. PATIENT-LVL V: ICD-10-PCS | Mod: PBBFAC,HCNC,, | Performed by: INTERNAL MEDICINE

## 2020-10-13 PROCEDURE — 3075F SYST BP GE 130 - 139MM HG: CPT | Mod: HCNC,CPTII,S$GLB, | Performed by: INTERNAL MEDICINE

## 2020-10-13 PROCEDURE — 1101F PT FALLS ASSESS-DOCD LE1/YR: CPT | Mod: HCNC,CPTII,S$GLB, | Performed by: INTERNAL MEDICINE

## 2020-10-13 PROCEDURE — 3078F PR MOST RECENT DIASTOLIC BLOOD PRESSURE < 80 MM HG: ICD-10-PCS | Mod: HCNC,CPTII,S$GLB, | Performed by: INTERNAL MEDICINE

## 2020-10-13 PROCEDURE — 1126F AMNT PAIN NOTED NONE PRSNT: CPT | Mod: HCNC,S$GLB,, | Performed by: INTERNAL MEDICINE

## 2020-10-13 RX ORDER — INSULIN ASPART 100 [IU]/ML
INJECTION, SOLUTION INTRAVENOUS; SUBCUTANEOUS
Qty: 16.2 ML | Refills: 3
Start: 2020-10-13 | End: 2021-03-26 | Stop reason: SDUPTHER

## 2020-10-13 RX ORDER — INSULIN GLARGINE 100 [IU]/ML
20 INJECTION, SOLUTION SUBCUTANEOUS NIGHTLY
Qty: 18 ML | Refills: 3 | Status: SHIPPED | OUTPATIENT
Start: 2020-10-13 | End: 2021-01-05 | Stop reason: SDUPTHER

## 2020-10-13 NOTE — PROGRESS NOTES
Subjective:      Patient ID: Kamar Muñoz is a 75 y.o. male.    Chief Complaint:  UNCONTROLLED TYPE 2 DIABETES    History of Present Illness  75 year old male with HTN,GERD, Dyslipidemia referred here for evaluation of Diabetes mellitus type 2      Diagnosed with [id number] years ago     Current diabetes medications include:  Lantus  22 units at bedtime  Novolog 15 units for breakfast and lunch,17 units for dinner.  Metformin 500 mg twice daily        Sugar logs reviewed in detail--patient with fasting lows and elevated preprandial lunch and dinner sugars.  Bedtime sugars highly variable        Hypoglycemia occurs:   fasting lows.  Including 1 low of 50 The patient experiences the following symptoms when blood glucoses are in the 70s dizziness,   visual changes.  The patient treats these hypoglycemic episodes with:   sugary items   symptoms improve within: 15 minutes or more.       Diabetes Education in the past - Yes        Meals:       Breakfast- cornflakes +coffee    Lunch-  sandwich/salad    Dinner- home cooked+ chips    Snacks- sugar free cookies/icecream     Drinks- diet coke     Activity- weights+bike     Weight has  Been decreasing lately     Any hospitalizations r/t to diabetes over last year :No    Chronic Complications:          Microvascular -  no diabetic retinopathy.  Last evaluated by opthal  on 12/2/2019                               + proteinuria. +numbness and tingling in feet, on gabapentin- has back issues    Macrovascular -no coronary artery disease or stroke       Denies foot ulcers, deformities.  Performs foot care at home.     Steroids recently :  Received steroid shot- left hand 5-6 weeks ago, reports sugars were very elevated for that week    Diabetes Management Status    Statin: Taking  ACE/ARB: Taking    Screening or Prevention Patient's value Goal Complete/Controlled?   HgA1C Testing and Control   Lab Results   Component Value Date    HGBA1C 12.3 (H) 09/09/2020       "Annually/Less than 8% No   Lipid profile : 12/28/2019 Annually Yes   LDL control Lab Results   Component Value Date    LDLCALC 70.2 12/28/2019    Annually/Less than 100 mg/dl  Yes   Nephropathy screening Lab Results   Component Value Date    LABMICR 95.0 05/24/2019     Lab Results   Component Value Date    PROTEINUA Negative 08/08/2020    Annually Yes   Blood pressure BP Readings from Last 1 Encounters:   10/13/20 139/73    Less than 140/90 Yes   Dilated retinal exam : 12/02/2019 Annually Yes   Foot exam   : 09/10/2020 Annually Yes       Review of Systems   Review of 7 systems negative except as documented above    Objective:     /73 (BP Location: Right arm, Patient Position: Sitting, BP Method: Medium (Automatic))   Pulse 83   Temp 97.3 °F (36.3 °C) (Temporal)   Ht 6' 2" (1.88 m)   Wt 90.1 kg (198 lb 11.2 oz)   SpO2 96%   BMI 25.51 kg/m²   BP Readings from Last 3 Encounters:   10/13/20 139/73   10/05/20 121/78   09/28/20 138/74     Wt Readings from Last 1 Encounters:   10/13/20 1316 90.1 kg (198 lb 11.2 oz)     Body mass index is 25.51 kg/m².      Physical Exam  Vitals signs reviewed.   Constitutional:       Appearance: Normal appearance. He is well-developed.   Cardiovascular:      Rate and Rhythm: Normal rate.   Pulmonary:      Effort: Pulmonary effort is normal.      Breath sounds: Normal breath sounds.   Abdominal:      Palpations: Abdomen is soft.   Neurological:      Mental Status: He is alert and oriented to person, place, and time.   Psychiatric:         Judgment: Judgment normal.                Lab Review:   Lab Results   Component Value Date    HGBA1C 12.3 (H) 09/09/2020     Lab Results   Component Value Date    CHOL 140 12/28/2019    HDL 58 12/28/2019    LDLCALC 70.2 12/28/2019    TRIG 59 12/28/2019    CHOLHDL 41.4 12/28/2019     Lab Results   Component Value Date     09/09/2020    K 3.7 09/09/2020     09/09/2020    CO2 27 09/09/2020    GLU 72 09/09/2020    BUN 20 09/09/2020    " CREATININE 1.0 09/09/2020    CALCIUM 8.6 (L) 09/09/2020    PROT 6.8 09/09/2020    ALBUMIN 3.5 09/09/2020    BILITOT 0.7 09/09/2020    ALKPHOS 90 09/09/2020    AST 18 09/09/2020    ALT 15 09/09/2020    ANIONGAP 8 09/09/2020    ESTGFRAFRICA >60.0 09/09/2020    EGFRNONAA >60.0 09/09/2020    TSH 0.556 05/24/2019     Vit D, 25-Hydroxy   Date Value Ref Range Status   05/05/2014 24 (L) 30 - 96 ng/mL Final     Comment:     Vitamin D deficiency.........<10 ng/mL                              Vitamin D insufficiency......10-29 ng/mL       Vitamin D sufficiency........> or equal to 30 ng/mL  Vitamin D toxicity............>100 ng/mL       Assessment and Plan     Type 2 diabetes mellitus with hyperglycemia, with long-term current use of insulin  75-year-old elderly male with history of long-standing type 2 diabetes complicated with microalbuminuria here for further evaluation of uncontrolled type 2 diabetes.  Most recent A1c elevated at 12.3.  Goal A1c is less than 7.5 with no low sugars     Had  In depth discussion regarding diabetes disease process with emphasis on risks of both hypo and hyperglycemia.   Also discussed in detail about the diabetic dietary lifestyle-with emphasis on low glycemic foods and portion control.   Patient was recommended to abstain from indulging in sugar free ice cream/cookies.  Even though it is a sugar free, it  does have good amount of carbohydrate     Sugar logs reviewed in detail.  Noted with fasting lows and elevated preprandial sugars  Our 1st aim is to eliminate low sugars.  Discussed with patient about the same.  Will recommend to decrease Lantus to 20 units  Will Continue novolog 17 units for breakfast and  15 units for lunch,17 units for dinner.     Patient was recommended to check his sugars before each meal and bedtime and send sugar logs for review in 1-2 weeks.    Hypoglycemia precautions discussed. Instructed on precautions before driving.  Glucagon prescription sent to his pharmacy  during the last visit     Hypertension -well controlled on current regimen.     Hyperlipidemia - no recent lipid panel available.  Ordered fasting lipid panel, will review.  On statin    Proteinuria - reviewed labs and meaning, stressed importance of glycemic management on renal health - on losartan     Obesity - benefits of weight loss with emphasis on lowering insulin resistance reviewed, recommend following meal plan and increasing activity     Follow-up visit in 1 month

## 2020-10-19 NOTE — ASSESSMENT & PLAN NOTE
75-year-old elderly male with history of long-standing type 2 diabetes complicated with microalbuminuria here for further evaluation of uncontrolled type 2 diabetes.  Most recent A1c elevated at 12.3.  Goal A1c is less than 7.5 with no low sugars     Had  In depth discussion regarding diabetes disease process with emphasis on risks of both hypo and hyperglycemia.   Also discussed in detail about the diabetic dietary lifestyle-with emphasis on low glycemic foods and portion control.   Patient was recommended to abstain from indulging in sugar free ice cream/cookies.  Even though it is a sugar free, it  does have good amount of carbohydrate     Sugar logs reviewed in detail.  Noted with fasting lows and elevated preprandial sugars  Our 1st aim is to eliminate low sugars.  Discussed with patient about the same.  Will recommend to decrease Lantus to 20 units  Will Continue novolog 17 units for breakfast and  15 units for lunch,17 units for dinner.     Patient was recommended to check his sugars before each meal and bedtime and send sugar logs for review in 1-2 weeks.    Hypoglycemia precautions discussed. Instructed on precautions before driving.  Glucagon prescription sent to his pharmacy during the last visit     Hypertension -well controlled on current regimen.     Hyperlipidemia - no recent lipid panel available.  Ordered fasting lipid panel, will review.  On statin    Proteinuria - reviewed labs and meaning, stressed importance of glycemic management on renal health - on losartan     Obesity - benefits of weight loss with emphasis on lowering insulin resistance reviewed, recommend following meal plan and increasing activity     Follow-up visit in 1 month

## 2020-11-05 DIAGNOSIS — M15.9 PRIMARY OSTEOARTHRITIS INVOLVING MULTIPLE JOINTS: ICD-10-CM

## 2020-11-05 RX ORDER — CELECOXIB 200 MG/1
200 CAPSULE ORAL DAILY PRN
Qty: 90 CAPSULE | Refills: 0 | Status: SHIPPED | OUTPATIENT
Start: 2020-11-05 | End: 2021-01-04

## 2020-11-09 ENCOUNTER — PES CALL (OUTPATIENT)
Dept: ADMINISTRATIVE | Facility: CLINIC | Age: 75
End: 2020-11-09

## 2020-11-17 ENCOUNTER — TELEPHONE (OUTPATIENT)
Dept: FAMILY MEDICINE | Facility: CLINIC | Age: 75
End: 2020-11-17

## 2020-11-17 NOTE — TELEPHONE ENCOUNTER
----- Message from Dorcas German sent at 11/17/2020  3:09 PM CST -----  Regarding: [facility] [phone]  Contact: [facility] [phone]  Calling to talk to nurse in regards to see if a scripts for diabetic shoes has been received.

## 2020-11-17 NOTE — TELEPHONE ENCOUNTER
----- Message from Dorcas German sent at 11/17/2020  3:09 PM CST -----  Regarding: [facility] [phone]  Contact: [facility] [phone]  Calling to talk to nurse in regards to see if a scripts for diabetic shoes has been received.

## 2020-11-19 ENCOUNTER — TELEPHONE (OUTPATIENT)
Dept: FAMILY MEDICINE | Facility: CLINIC | Age: 75
End: 2020-11-19

## 2020-11-19 NOTE — TELEPHONE ENCOUNTER
----- Message from Rubina Shah sent at 11/19/2020  3:50 PM CST -----  Contact: Osvaldo @ [facility]-[phone]  Type:  Needs Medical Advice    Who Called: Osvaldo @ [facility]  Reason for Call: regarding if the nurse received the prescription that was sent to the Office  Would the patient rather a call back or a response via [facility]? Call back  Best Call Back Number: [phone]

## 2020-11-19 NOTE — TELEPHONE ENCOUNTER
Spoke to Osvaldo @ [facility] and confirmed receipt of fax. She requested once signed to please fax over with progress notes. Osvaldo voiced understanding

## 2020-12-01 ENCOUNTER — TELEPHONE (OUTPATIENT)
Dept: INTERNAL MEDICINE | Facility: CLINIC | Age: 75
End: 2020-12-01

## 2020-12-01 DIAGNOSIS — Z79.4 TYPE 2 DIABETES MELLITUS WITH HYPERGLYCEMIA, WITH LONG-TERM CURRENT USE OF INSULIN: ICD-10-CM

## 2020-12-01 DIAGNOSIS — I10 ESSENTIAL HYPERTENSION: Primary | ICD-10-CM

## 2020-12-01 DIAGNOSIS — E11.65 TYPE 2 DIABETES MELLITUS WITH HYPERGLYCEMIA, WITH LONG-TERM CURRENT USE OF INSULIN: ICD-10-CM

## 2020-12-01 NOTE — TELEPHONE ENCOUNTER
----- Message from Mariya Cabral sent at 12/1/2020  9:14 AM CST -----  Type:  Sooner Apoointment Request    Caller is requesting a sooner appointment.  Caller declined first available appointment listed below.  Caller will not accept being placed on the waitlist and is requesting a message be sent to doctor.  Name of Caller: Patient   When is the first available appointment? 05/2021  Symptoms: follow up for A1C check   Would the patient rather a call back or a response via [facility]?  Call   Best Call Back Number: [phone]  Additional Information:

## 2020-12-09 ENCOUNTER — TELEPHONE (OUTPATIENT)
Dept: INTERNAL MEDICINE | Facility: CLINIC | Age: 75
End: 2020-12-09

## 2020-12-11 DIAGNOSIS — I10 ESSENTIAL HYPERTENSION: ICD-10-CM

## 2020-12-11 RX ORDER — DILTIAZEM HYDROCHLORIDE 120 MG/1
CAPSULE, COATED, EXTENDED RELEASE ORAL
Qty: 30 CAPSULE | Refills: 0 | Status: SHIPPED | OUTPATIENT
Start: 2020-12-11 | End: 2020-12-13

## 2020-12-11 NOTE — TELEPHONE ENCOUNTER
----- Message from Umu Esquivel sent at 12/11/2020  4:19 PM CST -----  Type:  RX Refill Request    Who Called: Edward   Refill or New Rx:    RX Name and Strength:  diltiaZEM (CARTIA XT) 120 MG Cp24  Preferred Pharmacy with phone number: [id number] - [place], [place] - [address] AT [facility] [address] [phone] (Phone)  [phone] (Fax)  Would the patient rather a call back or a response via [facility]? Call back   Best Call Back Number: [phone]   Additional Information:

## 2020-12-15 ENCOUNTER — TELEPHONE (OUTPATIENT)
Dept: FAMILY MEDICINE | Facility: CLINIC | Age: 75
End: 2020-12-15

## 2021-01-04 ENCOUNTER — LAB VISIT (OUTPATIENT)
Dept: LAB | Facility: HOSPITAL | Age: 76
End: 2021-01-04
Attending: FAMILY MEDICINE
Payer: MEDICARE

## 2021-01-04 DIAGNOSIS — I10 ESSENTIAL HYPERTENSION: ICD-10-CM

## 2021-01-04 DIAGNOSIS — E11.65 TYPE 2 DIABETES MELLITUS WITH HYPERGLYCEMIA, WITH LONG-TERM CURRENT USE OF INSULIN: ICD-10-CM

## 2021-01-04 DIAGNOSIS — Z79.4 TYPE 2 DIABETES MELLITUS WITH HYPERGLYCEMIA, WITH LONG-TERM CURRENT USE OF INSULIN: ICD-10-CM

## 2021-01-04 LAB
ALBUMIN SERPL BCP-MCNC: 3.8 G/DL (ref 3.5–5.2)
ALP SERPL-CCNC: 98 U/L (ref 55–135)
ALT SERPL W/O P-5'-P-CCNC: 15 U/L (ref 10–44)
ANION GAP SERPL CALC-SCNC: 12 MMOL/L (ref 8–16)
AST SERPL-CCNC: 20 U/L (ref 10–40)
BASOPHILS # BLD AUTO: 0.05 K/UL (ref 0–0.2)
BASOPHILS NFR BLD: 0.8 % (ref 0–1.9)
BILIRUB SERPL-MCNC: 0.7 MG/DL (ref 0.1–1)
BUN SERPL-MCNC: 14 MG/DL (ref 8–23)
CALCIUM SERPL-MCNC: 8.9 MG/DL (ref 8.7–10.5)
CHLORIDE SERPL-SCNC: 105 MMOL/L (ref 95–110)
CHOLEST SERPL-MCNC: 164 MG/DL (ref 120–199)
CHOLEST/HDLC SERPL: 2.2 {RATIO} (ref 2–5)
CO2 SERPL-SCNC: 27 MMOL/L (ref 23–29)
CREAT SERPL-MCNC: 1.1 MG/DL (ref 0.5–1.4)
DIFFERENTIAL METHOD: ABNORMAL
EOSINOPHIL # BLD AUTO: 0.5 K/UL (ref 0–0.5)
EOSINOPHIL NFR BLD: 7.3 % (ref 0–8)
ERYTHROCYTE [DISTWIDTH] IN BLOOD BY AUTOMATED COUNT: 11.9 % (ref 11.5–14.5)
EST. GFR  (AFRICAN AMERICAN): >60 ML/MIN/1.73 M^2
EST. GFR  (NON AFRICAN AMERICAN): >60 ML/MIN/1.73 M^2
ESTIMATED AVG GLUCOSE: 321 MG/DL (ref 68–131)
GLUCOSE SERPL-MCNC: 137 MG/DL (ref 70–110)
HBA1C MFR BLD HPLC: 12.8 % (ref 4–5.6)
HCT VFR BLD AUTO: 50.6 % (ref 40–54)
HDLC SERPL-MCNC: 73 MG/DL (ref 40–75)
HDLC SERPL: 44.5 % (ref 20–50)
HGB BLD-MCNC: 15.8 G/DL (ref 14–18)
IMM GRANULOCYTES # BLD AUTO: 0.02 K/UL (ref 0–0.04)
IMM GRANULOCYTES NFR BLD AUTO: 0.3 % (ref 0–0.5)
LDLC SERPL CALC-MCNC: 69.6 MG/DL (ref 63–159)
LYMPHOCYTES # BLD AUTO: 2.4 K/UL (ref 1–4.8)
LYMPHOCYTES NFR BLD: 38.1 % (ref 18–48)
MCH RBC QN AUTO: 28.6 PG (ref 27–31)
MCHC RBC AUTO-ENTMCNC: 31.2 G/DL (ref 32–36)
MCV RBC AUTO: 92 FL (ref 82–98)
MONOCYTES # BLD AUTO: 0.6 K/UL (ref 0.3–1)
MONOCYTES NFR BLD: 9.9 % (ref 4–15)
NEUTROPHILS # BLD AUTO: 2.7 K/UL (ref 1.8–7.7)
NEUTROPHILS NFR BLD: 43.6 % (ref 38–73)
NONHDLC SERPL-MCNC: 91 MG/DL
NRBC BLD-RTO: 0 /100 WBC
PLATELET # BLD AUTO: 203 K/UL (ref 150–350)
PMV BLD AUTO: 10.6 FL (ref 9.2–12.9)
POTASSIUM SERPL-SCNC: 3.8 MMOL/L (ref 3.5–5.1)
PROT SERPL-MCNC: 7.1 G/DL (ref 6–8.4)
RBC # BLD AUTO: 5.52 M/UL (ref 4.6–6.2)
SODIUM SERPL-SCNC: 144 MMOL/L (ref 136–145)
TRIGL SERPL-MCNC: 107 MG/DL (ref 30–150)
TSH SERPL DL<=0.005 MIU/L-ACNC: 1.52 UIU/ML (ref 0.4–4)
WBC # BLD AUTO: 6.28 K/UL (ref 3.9–12.7)

## 2021-01-04 PROCEDURE — 80053 COMPREHEN METABOLIC PANEL: CPT | Mod: HCNC

## 2021-01-04 PROCEDURE — 85025 COMPLETE CBC W/AUTO DIFF WBC: CPT | Mod: HCNC

## 2021-01-04 PROCEDURE — 84443 ASSAY THYROID STIM HORMONE: CPT | Mod: HCNC

## 2021-01-04 PROCEDURE — 80061 LIPID PANEL: CPT | Mod: HCNC

## 2021-01-04 PROCEDURE — 36415 COLL VENOUS BLD VENIPUNCTURE: CPT | Mod: HCNC,PO

## 2021-01-04 PROCEDURE — 83036 HEMOGLOBIN GLYCOSYLATED A1C: CPT | Mod: HCNC

## 2021-01-05 ENCOUNTER — OFFICE VISIT (OUTPATIENT)
Dept: FAMILY MEDICINE | Facility: CLINIC | Age: 76
End: 2021-01-05
Payer: MEDICARE

## 2021-01-05 VITALS
HEART RATE: 72 BPM | DIASTOLIC BLOOD PRESSURE: 78 MMHG | WEIGHT: 195.31 LBS | TEMPERATURE: 97 F | SYSTOLIC BLOOD PRESSURE: 132 MMHG | OXYGEN SATURATION: 97 % | BODY MASS INDEX: 25.08 KG/M2

## 2021-01-05 DIAGNOSIS — Z79.4 TYPE 2 DIABETES MELLITUS WITH HYPERGLYCEMIA, WITH LONG-TERM CURRENT USE OF INSULIN: ICD-10-CM

## 2021-01-05 DIAGNOSIS — E11.65 TYPE 2 DIABETES MELLITUS WITH HYPERGLYCEMIA, WITH LONG-TERM CURRENT USE OF INSULIN: ICD-10-CM

## 2021-01-05 PROCEDURE — 3078F PR MOST RECENT DIASTOLIC BLOOD PRESSURE < 80 MM HG: ICD-10-PCS | Mod: HCNC,CPTII,S$GLB, | Performed by: FAMILY MEDICINE

## 2021-01-05 PROCEDURE — 3288F FALL RISK ASSESSMENT DOCD: CPT | Mod: HCNC,CPTII,S$GLB, | Performed by: FAMILY MEDICINE

## 2021-01-05 PROCEDURE — 1126F AMNT PAIN NOTED NONE PRSNT: CPT | Mod: HCNC,S$GLB,, | Performed by: FAMILY MEDICINE

## 2021-01-05 PROCEDURE — 99214 PR OFFICE/OUTPT VISIT, EST, LEVL IV, 30-39 MIN: ICD-10-PCS | Mod: HCNC,S$GLB,, | Performed by: FAMILY MEDICINE

## 2021-01-05 PROCEDURE — 1101F PT FALLS ASSESS-DOCD LE1/YR: CPT | Mod: HCNC,CPTII,S$GLB, | Performed by: FAMILY MEDICINE

## 2021-01-05 PROCEDURE — 99999 PR PBB SHADOW E&M-EST. PATIENT-LVL V: CPT | Mod: PBBFAC,HCNC,, | Performed by: FAMILY MEDICINE

## 2021-01-05 PROCEDURE — 1159F PR MEDICATION LIST DOCUMENTED IN MEDICAL RECORD: ICD-10-PCS | Mod: HCNC,S$GLB,, | Performed by: FAMILY MEDICINE

## 2021-01-05 PROCEDURE — 99999 PR PBB SHADOW E&M-EST. PATIENT-LVL V: ICD-10-PCS | Mod: PBBFAC,HCNC,, | Performed by: FAMILY MEDICINE

## 2021-01-05 PROCEDURE — 3078F DIAST BP <80 MM HG: CPT | Mod: HCNC,CPTII,S$GLB, | Performed by: FAMILY MEDICINE

## 2021-01-05 PROCEDURE — 99214 OFFICE O/P EST MOD 30 MIN: CPT | Mod: HCNC,S$GLB,, | Performed by: FAMILY MEDICINE

## 2021-01-05 PROCEDURE — 3075F PR MOST RECENT SYSTOLIC BLOOD PRESS GE 130-139MM HG: ICD-10-PCS | Mod: HCNC,CPTII,S$GLB, | Performed by: FAMILY MEDICINE

## 2021-01-05 PROCEDURE — 1159F MED LIST DOCD IN RCRD: CPT | Mod: HCNC,S$GLB,, | Performed by: FAMILY MEDICINE

## 2021-01-05 PROCEDURE — 1101F PR PT FALLS ASSESS DOC 0-1 FALLS W/OUT INJ PAST YR: ICD-10-PCS | Mod: HCNC,CPTII,S$GLB, | Performed by: FAMILY MEDICINE

## 2021-01-05 PROCEDURE — 3288F PR FALLS RISK ASSESSMENT DOCUMENTED: ICD-10-PCS | Mod: HCNC,CPTII,S$GLB, | Performed by: FAMILY MEDICINE

## 2021-01-05 PROCEDURE — 1126F PR PAIN SEVERITY QUANTIFIED, NO PAIN PRESENT: ICD-10-PCS | Mod: HCNC,S$GLB,, | Performed by: FAMILY MEDICINE

## 2021-01-05 PROCEDURE — 3075F SYST BP GE 130 - 139MM HG: CPT | Mod: HCNC,CPTII,S$GLB, | Performed by: FAMILY MEDICINE

## 2021-01-05 RX ORDER — INSULIN GLARGINE 100 [IU]/ML
25 INJECTION, SOLUTION SUBCUTANEOUS NIGHTLY
Qty: 22.5 ML | Refills: 3 | Status: SHIPPED | OUTPATIENT
Start: 2021-01-05 | End: 2021-01-06 | Stop reason: SDUPTHER

## 2021-01-06 DIAGNOSIS — E11.65 TYPE 2 DIABETES MELLITUS WITH HYPERGLYCEMIA, WITH LONG-TERM CURRENT USE OF INSULIN: ICD-10-CM

## 2021-01-06 DIAGNOSIS — Z79.4 TYPE 2 DIABETES MELLITUS WITH HYPERGLYCEMIA, WITH LONG-TERM CURRENT USE OF INSULIN: ICD-10-CM

## 2021-01-06 RX ORDER — INSULIN GLARGINE 100 [IU]/ML
25 INJECTION, SOLUTION SUBCUTANEOUS NIGHTLY
Qty: 22.5 ML | Refills: 3 | Status: SHIPPED | OUTPATIENT
Start: 2021-01-06 | End: 2021-10-02 | Stop reason: SDUPTHER

## 2021-01-07 ENCOUNTER — PATIENT MESSAGE (OUTPATIENT)
Dept: FAMILY MEDICINE | Facility: CLINIC | Age: 76
End: 2021-01-07

## 2021-01-07 DIAGNOSIS — E13.40 NEUROPATHY DUE TO SECONDARY DIABETES: ICD-10-CM

## 2021-01-07 RX ORDER — GABAPENTIN 300 MG/1
300 CAPSULE ORAL 2 TIMES DAILY
Qty: 180 CAPSULE | Refills: 3 | Status: SHIPPED | OUTPATIENT
Start: 2021-01-07 | End: 2022-01-05 | Stop reason: SDUPTHER

## 2021-01-22 ENCOUNTER — PATIENT MESSAGE (OUTPATIENT)
Dept: ADMINISTRATIVE | Facility: OTHER | Age: 76
End: 2021-01-22

## 2021-03-10 LAB
LEFT EYE DM RETINOPATHY: NEGATIVE
RIGHT EYE DM RETINOPATHY: NEGATIVE

## 2021-03-19 ENCOUNTER — PATIENT OUTREACH (OUTPATIENT)
Dept: ADMINISTRATIVE | Facility: HOSPITAL | Age: 76
End: 2021-03-19

## 2021-03-23 ENCOUNTER — OFFICE VISIT (OUTPATIENT)
Dept: FAMILY MEDICINE | Facility: CLINIC | Age: 76
End: 2021-03-23
Payer: MEDICARE

## 2021-03-23 VITALS
HEIGHT: 74 IN | SYSTOLIC BLOOD PRESSURE: 104 MMHG | BODY MASS INDEX: 24.9 KG/M2 | OXYGEN SATURATION: 97 % | TEMPERATURE: 97 F | DIASTOLIC BLOOD PRESSURE: 60 MMHG | WEIGHT: 194 LBS | HEART RATE: 67 BPM

## 2021-03-23 DIAGNOSIS — E78.5 DYSLIPIDEMIA: ICD-10-CM

## 2021-03-23 DIAGNOSIS — Z12.2 ENCOUNTER FOR SCREENING FOR MALIGNANT NEOPLASM OF LUNG: ICD-10-CM

## 2021-03-23 DIAGNOSIS — Z79.4 TYPE 2 DIABETES MELLITUS WITH HYPERGLYCEMIA, WITH LONG-TERM CURRENT USE OF INSULIN: Primary | ICD-10-CM

## 2021-03-23 DIAGNOSIS — Z12.2 ENCOUNTER FOR SCREENING FOR MALIGNANT NEOPLASM OF RESPIRATORY ORGANS: ICD-10-CM

## 2021-03-23 DIAGNOSIS — E11.65 TYPE 2 DIABETES MELLITUS WITH HYPERGLYCEMIA, WITH LONG-TERM CURRENT USE OF INSULIN: Primary | ICD-10-CM

## 2021-03-23 PROCEDURE — 3288F PR FALLS RISK ASSESSMENT DOCUMENTED: ICD-10-PCS | Mod: CPTII,S$GLB,, | Performed by: FAMILY MEDICINE

## 2021-03-23 PROCEDURE — 99214 OFFICE O/P EST MOD 30 MIN: CPT | Mod: S$GLB,,, | Performed by: FAMILY MEDICINE

## 2021-03-23 PROCEDURE — 3078F PR MOST RECENT DIASTOLIC BLOOD PRESSURE < 80 MM HG: ICD-10-PCS | Mod: CPTII,S$GLB,, | Performed by: FAMILY MEDICINE

## 2021-03-23 PROCEDURE — 3074F SYST BP LT 130 MM HG: CPT | Mod: CPTII,S$GLB,, | Performed by: FAMILY MEDICINE

## 2021-03-23 PROCEDURE — 3288F FALL RISK ASSESSMENT DOCD: CPT | Mod: CPTII,S$GLB,, | Performed by: FAMILY MEDICINE

## 2021-03-23 PROCEDURE — 1159F MED LIST DOCD IN RCRD: CPT | Mod: S$GLB,,, | Performed by: FAMILY MEDICINE

## 2021-03-23 PROCEDURE — 3074F PR MOST RECENT SYSTOLIC BLOOD PRESSURE < 130 MM HG: ICD-10-PCS | Mod: CPTII,S$GLB,, | Performed by: FAMILY MEDICINE

## 2021-03-23 PROCEDURE — 1125F AMNT PAIN NOTED PAIN PRSNT: CPT | Mod: S$GLB,,, | Performed by: FAMILY MEDICINE

## 2021-03-23 PROCEDURE — 99214 PR OFFICE/OUTPT VISIT, EST, LEVL IV, 30-39 MIN: ICD-10-PCS | Mod: S$GLB,,, | Performed by: FAMILY MEDICINE

## 2021-03-23 PROCEDURE — 1125F PR PAIN SEVERITY QUANTIFIED, PAIN PRESENT: ICD-10-PCS | Mod: S$GLB,,, | Performed by: FAMILY MEDICINE

## 2021-03-23 PROCEDURE — 99999 PR PBB SHADOW E&M-EST. PATIENT-LVL V: ICD-10-PCS | Mod: PBBFAC,,, | Performed by: FAMILY MEDICINE

## 2021-03-23 PROCEDURE — 99999 PR PBB SHADOW E&M-EST. PATIENT-LVL V: CPT | Mod: PBBFAC,,, | Performed by: FAMILY MEDICINE

## 2021-03-23 PROCEDURE — 1101F PT FALLS ASSESS-DOCD LE1/YR: CPT | Mod: CPTII,S$GLB,, | Performed by: FAMILY MEDICINE

## 2021-03-23 PROCEDURE — 3078F DIAST BP <80 MM HG: CPT | Mod: CPTII,S$GLB,, | Performed by: FAMILY MEDICINE

## 2021-03-23 PROCEDURE — 1101F PR PT FALLS ASSESS DOC 0-1 FALLS W/OUT INJ PAST YR: ICD-10-PCS | Mod: CPTII,S$GLB,, | Performed by: FAMILY MEDICINE

## 2021-03-23 PROCEDURE — 1159F PR MEDICATION LIST DOCUMENTED IN MEDICAL RECORD: ICD-10-PCS | Mod: S$GLB,,, | Performed by: FAMILY MEDICINE

## 2021-03-26 ENCOUNTER — HOSPITAL ENCOUNTER (OUTPATIENT)
Dept: RADIOLOGY | Facility: HOSPITAL | Age: 76
Discharge: HOME | End: 2021-03-26
Attending: FAMILY MEDICINE
Payer: MEDICARE

## 2021-03-26 ENCOUNTER — PATIENT MESSAGE (OUTPATIENT)
Dept: FAMILY MEDICINE | Facility: CLINIC | Age: 76
End: 2021-03-26

## 2021-03-26 DIAGNOSIS — Z79.4 TYPE 2 DIABETES MELLITUS WITH HYPERGLYCEMIA, WITH LONG-TERM CURRENT USE OF INSULIN: ICD-10-CM

## 2021-03-26 DIAGNOSIS — Z12.2 ENCOUNTER FOR SCREENING FOR MALIGNANT NEOPLASM OF RESPIRATORY ORGANS: ICD-10-CM

## 2021-03-26 DIAGNOSIS — E11.65 TYPE 2 DIABETES MELLITUS WITH HYPERGLYCEMIA, WITH LONG-TERM CURRENT USE OF INSULIN: ICD-10-CM

## 2021-03-26 PROCEDURE — 71271 CT CHEST LUNG SCREENING LOW DOSE: ICD-10-PCS | Mod: 26,,, | Performed by: RADIOLOGY

## 2021-03-26 PROCEDURE — 71271 CT THORAX LUNG CANCER SCR C-: CPT | Mod: TC

## 2021-03-26 PROCEDURE — 71271 CT THORAX LUNG CANCER SCR C-: CPT | Mod: 26,,, | Performed by: RADIOLOGY

## 2021-03-26 RX ORDER — INSULIN ASPART 100 [IU]/ML
INJECTION, SOLUTION INTRAVENOUS; SUBCUTANEOUS
Qty: 16.2 ML | Refills: 3 | Status: SHIPPED | OUTPATIENT
Start: 2021-03-26 | End: 2021-05-26 | Stop reason: SDUPTHER

## 2021-03-29 ENCOUNTER — TELEPHONE (OUTPATIENT)
Dept: ADMINISTRATIVE | Facility: OTHER | Age: 76
End: 2021-03-29

## 2021-03-29 ENCOUNTER — TELEPHONE (OUTPATIENT)
Dept: FAMILY MEDICINE | Facility: CLINIC | Age: 76
End: 2021-03-29

## 2021-03-29 DIAGNOSIS — J43.9 PULMONARY EMPHYSEMA, UNSPECIFIED EMPHYSEMA TYPE: ICD-10-CM

## 2021-03-29 DIAGNOSIS — R91.8 PULMONARY NODULES: Primary | ICD-10-CM

## 2021-04-05 ENCOUNTER — OFFICE VISIT (OUTPATIENT)
Dept: PULMONOLOGY | Facility: CLINIC | Age: 76
End: 2021-04-05
Payer: MEDICARE

## 2021-04-05 ENCOUNTER — LAB VISIT (OUTPATIENT)
Dept: LAB | Facility: HOSPITAL | Age: 76
End: 2021-04-05
Attending: INTERNAL MEDICINE
Payer: MEDICARE

## 2021-04-05 VITALS
WEIGHT: 191.81 LBS | BODY MASS INDEX: 24.62 KG/M2 | DIASTOLIC BLOOD PRESSURE: 81 MMHG | SYSTOLIC BLOOD PRESSURE: 130 MMHG | OXYGEN SATURATION: 96 % | HEART RATE: 87 BPM | HEIGHT: 74 IN | RESPIRATION RATE: 18 BRPM

## 2021-04-05 DIAGNOSIS — R91.8 PULMONARY NODULES: Primary | ICD-10-CM

## 2021-04-05 DIAGNOSIS — J43.9 PULMONARY EMPHYSEMA, UNSPECIFIED EMPHYSEMA TYPE: ICD-10-CM

## 2021-04-05 DIAGNOSIS — R91.1 SOLITARY PULMONARY NODULE: ICD-10-CM

## 2021-04-05 DIAGNOSIS — R91.8 PULMONARY NODULES: ICD-10-CM

## 2021-04-05 LAB
BASOPHILS # BLD AUTO: 0.04 K/UL (ref 0–0.2)
BASOPHILS NFR BLD: 0.5 % (ref 0–1.9)
DIFFERENTIAL METHOD: ABNORMAL
EOSINOPHIL # BLD AUTO: 0.3 K/UL (ref 0–0.5)
EOSINOPHIL NFR BLD: 3.9 % (ref 0–8)
ERYTHROCYTE [DISTWIDTH] IN BLOOD BY AUTOMATED COUNT: 11.9 % (ref 11.5–14.5)
HCT VFR BLD AUTO: 42.7 % (ref 40–54)
HGB BLD-MCNC: 13.9 G/DL (ref 14–18)
IMM GRANULOCYTES # BLD AUTO: 0.01 K/UL (ref 0–0.04)
IMM GRANULOCYTES NFR BLD AUTO: 0.1 % (ref 0–0.5)
LYMPHOCYTES # BLD AUTO: 2.1 K/UL (ref 1–4.8)
LYMPHOCYTES NFR BLD: 27.6 % (ref 18–48)
MCH RBC QN AUTO: 28.9 PG (ref 27–31)
MCHC RBC AUTO-ENTMCNC: 32.6 G/DL (ref 32–36)
MCV RBC AUTO: 89 FL (ref 82–98)
MONOCYTES # BLD AUTO: 0.7 K/UL (ref 0.3–1)
MONOCYTES NFR BLD: 9 % (ref 4–15)
NEUTROPHILS # BLD AUTO: 4.4 K/UL (ref 1.8–7.7)
NEUTROPHILS NFR BLD: 58.9 % (ref 38–73)
NRBC BLD-RTO: 0 /100 WBC
PLATELET # BLD AUTO: 198 K/UL (ref 150–450)
PMV BLD AUTO: 9.8 FL (ref 9.2–12.9)
RBC # BLD AUTO: 4.81 M/UL (ref 4.6–6.2)
WBC # BLD AUTO: 7.43 K/UL (ref 3.9–12.7)

## 2021-04-05 PROCEDURE — 1159F MED LIST DOCD IN RCRD: CPT | Mod: S$GLB,,, | Performed by: INTERNAL MEDICINE

## 2021-04-05 PROCEDURE — 3079F DIAST BP 80-89 MM HG: CPT | Mod: CPTII,S$GLB,, | Performed by: INTERNAL MEDICINE

## 2021-04-05 PROCEDURE — 99999 PR PBB SHADOW E&M-EST. PATIENT-LVL V: ICD-10-PCS | Mod: PBBFAC,,, | Performed by: INTERNAL MEDICINE

## 2021-04-05 PROCEDURE — 99214 PR OFFICE/OUTPT VISIT, EST, LEVL IV, 30-39 MIN: ICD-10-PCS | Mod: S$GLB,,, | Performed by: INTERNAL MEDICINE

## 2021-04-05 PROCEDURE — 3075F PR MOST RECENT SYSTOLIC BLOOD PRESS GE 130-139MM HG: ICD-10-PCS | Mod: CPTII,S$GLB,, | Performed by: INTERNAL MEDICINE

## 2021-04-05 PROCEDURE — 1101F PT FALLS ASSESS-DOCD LE1/YR: CPT | Mod: CPTII,S$GLB,, | Performed by: INTERNAL MEDICINE

## 2021-04-05 PROCEDURE — 85025 COMPLETE CBC W/AUTO DIFF WBC: CPT | Performed by: INTERNAL MEDICINE

## 2021-04-05 PROCEDURE — 36415 COLL VENOUS BLD VENIPUNCTURE: CPT | Performed by: INTERNAL MEDICINE

## 2021-04-05 PROCEDURE — 3288F FALL RISK ASSESSMENT DOCD: CPT | Mod: CPTII,S$GLB,, | Performed by: INTERNAL MEDICINE

## 2021-04-05 PROCEDURE — 99999 PR PBB SHADOW E&M-EST. PATIENT-LVL V: CPT | Mod: PBBFAC,,, | Performed by: INTERNAL MEDICINE

## 2021-04-05 PROCEDURE — 99214 OFFICE O/P EST MOD 30 MIN: CPT | Mod: S$GLB,,, | Performed by: INTERNAL MEDICINE

## 2021-04-05 PROCEDURE — 1101F PR PT FALLS ASSESS DOC 0-1 FALLS W/OUT INJ PAST YR: ICD-10-PCS | Mod: CPTII,S$GLB,, | Performed by: INTERNAL MEDICINE

## 2021-04-05 PROCEDURE — 1159F PR MEDICATION LIST DOCUMENTED IN MEDICAL RECORD: ICD-10-PCS | Mod: S$GLB,,, | Performed by: INTERNAL MEDICINE

## 2021-04-05 PROCEDURE — 3075F SYST BP GE 130 - 139MM HG: CPT | Mod: CPTII,S$GLB,, | Performed by: INTERNAL MEDICINE

## 2021-04-05 PROCEDURE — 3288F PR FALLS RISK ASSESSMENT DOCUMENTED: ICD-10-PCS | Mod: CPTII,S$GLB,, | Performed by: INTERNAL MEDICINE

## 2021-04-05 PROCEDURE — 3079F PR MOST RECENT DIASTOLIC BLOOD PRESSURE 80-89 MM HG: ICD-10-PCS | Mod: CPTII,S$GLB,, | Performed by: INTERNAL MEDICINE

## 2021-04-14 ENCOUNTER — HOSPITAL ENCOUNTER (OUTPATIENT)
Dept: RADIOLOGY | Facility: HOSPITAL | Age: 76
Discharge: HOME | End: 2021-04-14
Attending: INTERNAL MEDICINE
Payer: MEDICARE

## 2021-04-14 ENCOUNTER — HOSPITAL ENCOUNTER (OUTPATIENT)
Dept: PULMONOLOGY | Facility: HOSPITAL | Age: 76
Discharge: HOME | End: 2021-04-14
Attending: INTERNAL MEDICINE
Payer: MEDICARE

## 2021-04-14 VITALS — RESPIRATION RATE: 18 BRPM | HEART RATE: 85 BPM

## 2021-04-14 DIAGNOSIS — R91.8 PULMONARY NODULES: ICD-10-CM

## 2021-04-14 DIAGNOSIS — R91.1 SOLITARY PULMONARY NODULE: ICD-10-CM

## 2021-04-14 PROCEDURE — 71250 CT CHEST WITHOUT CONTRAST: ICD-10-PCS | Mod: 26,,, | Performed by: RADIOLOGY

## 2021-04-14 PROCEDURE — 94726 PLETHYSMOGRAPHY LUNG VOLUMES: CPT

## 2021-04-14 PROCEDURE — 71250 CT THORAX DX C-: CPT | Mod: 26,,, | Performed by: RADIOLOGY

## 2021-04-14 PROCEDURE — 94010 BREATHING CAPACITY TEST: CPT

## 2021-04-14 PROCEDURE — 94640 AIRWAY INHALATION TREATMENT: CPT

## 2021-04-14 PROCEDURE — 25000242 PHARM REV CODE 250 ALT 637 W/ HCPCS: Performed by: INTERNAL MEDICINE

## 2021-04-14 PROCEDURE — 71250 CT THORAX DX C-: CPT | Mod: TC

## 2021-04-14 PROCEDURE — 94729 DIFFUSING CAPACITY: CPT

## 2021-04-14 RX ORDER — ALBUTEROL SULFATE 2.5 MG/.5ML
2.5 SOLUTION RESPIRATORY (INHALATION) ONCE
Status: COMPLETED | OUTPATIENT
Start: 2021-04-14 | End: 2021-04-14

## 2021-04-14 RX ADMIN — ALBUTEROL SULFATE 2.5 MG: 2.5 SOLUTION RESPIRATORY (INHALATION) at 08:04

## 2021-04-20 LAB
BRPFT: ABNORMAL
DLCO ADJ PRE: 16.04 ML/(MIN*MMHG) (ref 22.26–36.12)
DLCO SINGLE BREATH LLN: 22.26
DLCO SINGLE BREATH PRE REF: 53.8 %
DLCO SINGLE BREATH REF: 29.19
DLCOC SBVA LLN: 2.65
DLCOC SBVA PRE REF: 71.7 %
DLCOC SBVA REF: 3.68
DLCOC SINGLE BREATH LLN: 22.26
DLCOC SINGLE BREATH PRE REF: 55 %
DLCOC SINGLE BREATH REF: 29.19
DLCOVA LLN: 2.65
DLCOVA PRE REF: 70.2 %
DLCOVA PRE: 2.58 ML/(MIN*MMHG*L) (ref 2.65–4.7)
DLCOVA REF: 3.68
DLVAADJ PRE: 2.63 ML/(MIN*MMHG*L) (ref 2.65–4.7)
ERV LLN: -16448.92
ERV PRE REF: 113.8 %
ERV REF: 1.08
FEF 25 75 CHG: 8.1 %
FEF 25 75 LLN: 0.91
FEF 25 75 POST REF: 54.7 %
FEF 25 75 PRE REF: 50.6 %
FEF 25 75 REF: 2.33
FET100 CHG: 1.4 %
FEV1 CHG: 9.7 %
FEV1 FVC CHG: 2.3 %
FEV1 FVC LLN: 60
FEV1 FVC POST REF: 85.1 %
FEV1 FVC PRE REF: 83.3 %
FEV1 FVC REF: 74
FEV1 LLN: 2.34
FEV1 POST REF: 81 %
FEV1 PRE REF: 73.8 %
FEV1 REF: 3.34
FRCPLETH LLN: 3.02
FRCPLETH PREREF: 100.3 %
FRCPLETH REF: 4
FVC CHG: 7.3 %
FVC LLN: 3.3
FVC POST REF: 94.2 %
FVC PRE REF: 87.8 %
FVC REF: 4.54
IVC PRE: 4.08 L (ref 3.3–5.79)
IVC SINGLE BREATH LLN: 3.3
IVC SINGLE BREATH PRE REF: 89.9 %
IVC SINGLE BREATH REF: 4.54
PEF CHG: 8.8 %
PEF LLN: 5.94
PEF POST REF: 87.2 %
PEF PRE REF: 80.1 %
PEF REF: 8.53
POST FEF 25 75: 1.27 L/S (ref 0.91–3.74)
POST FET 100: 7.13 SEC
POST FEV1 FVC: 63.25 % (ref 59.97–88.59)
POST FEV1: 2.71 L (ref 2.34–4.35)
POST FVC: 4.28 L (ref 3.3–5.79)
POST PEF: 7.44 L/S (ref 5.94–11.12)
PRE DLCO: 15.71 ML/(MIN*MMHG) (ref 22.26–36.12)
PRE ERV: 1.22 L (ref -16448.92–16451.08)
PRE FEF 25 75: 1.18 L/S (ref 0.91–3.74)
PRE FET 100: 7.03 SEC
PRE FEV1 FVC: 61.85 % (ref 59.97–88.59)
PRE FEV1: 2.47 L (ref 2.34–4.35)
PRE FRC PL: 4.02 L
PRE FVC: 3.99 L (ref 3.3–5.79)
PRE PEF: 6.83 L/S (ref 5.94–11.12)
PRE RV: 2.72 L (ref 2.25–3.6)
PRE TLC: 6.81 L (ref 6.79–9.09)
RAW LLN: 3.06
RAW PRE REF: 96.7 %
RAW PRE: 2.96 CMH2O*S/L (ref 3.06–3.06)
RAW REF: 3.06
RV LLN: 2.25
RV PRE REF: 92.8 %
RV REF: 2.93
RVTLC LLN: 35
RVTLC PRE REF: 90.6 %
RVTLC PRE: 39.87 % (ref 35.01–52.97)
RVTLC REF: 44
TLC LLN: 6.79
TLC PRE REF: 85.8 %
TLC REF: 7.94
VA PRE: 6.09 L (ref 7.79–7.79)
VA SINGLE BREATH LLN: 7.79
VA SINGLE BREATH PRE REF: 78.2 %
VA SINGLE BREATH REF: 7.79
VC LLN: 3.3
VC PRE REF: 90.1 %
VC PRE: 4.1 L (ref 3.3–5.79)
VC REF: 4.54
VTGRAWPRE: 5.44 L

## 2021-05-17 ENCOUNTER — OFFICE VISIT (OUTPATIENT)
Dept: PULMONOLOGY | Facility: CLINIC | Age: 76
End: 2021-05-17
Payer: MEDICARE

## 2021-05-17 VITALS
DIASTOLIC BLOOD PRESSURE: 74 MMHG | HEIGHT: 74 IN | OXYGEN SATURATION: 96 % | SYSTOLIC BLOOD PRESSURE: 144 MMHG | HEART RATE: 80 BPM | WEIGHT: 203.94 LBS | BODY MASS INDEX: 26.17 KG/M2

## 2021-05-17 DIAGNOSIS — R91.8 PULMONARY NODULES: Primary | ICD-10-CM

## 2021-05-17 DIAGNOSIS — R91.1 SOLITARY PULMONARY NODULE: ICD-10-CM

## 2021-05-17 PROCEDURE — 1101F PT FALLS ASSESS-DOCD LE1/YR: CPT | Mod: CPTII,S$GLB,, | Performed by: INTERNAL MEDICINE

## 2021-05-17 PROCEDURE — 3288F FALL RISK ASSESSMENT DOCD: CPT | Mod: CPTII,S$GLB,, | Performed by: INTERNAL MEDICINE

## 2021-05-17 PROCEDURE — 3288F PR FALLS RISK ASSESSMENT DOCUMENTED: ICD-10-PCS | Mod: CPTII,S$GLB,, | Performed by: INTERNAL MEDICINE

## 2021-05-17 PROCEDURE — 99999 PR PBB SHADOW E&M-EST. PATIENT-LVL IV: ICD-10-PCS | Mod: PBBFAC,,, | Performed by: INTERNAL MEDICINE

## 2021-05-17 PROCEDURE — 1159F PR MEDICATION LIST DOCUMENTED IN MEDICAL RECORD: ICD-10-PCS | Mod: S$GLB,,, | Performed by: INTERNAL MEDICINE

## 2021-05-17 PROCEDURE — 1101F PR PT FALLS ASSESS DOC 0-1 FALLS W/OUT INJ PAST YR: ICD-10-PCS | Mod: CPTII,S$GLB,, | Performed by: INTERNAL MEDICINE

## 2021-05-17 PROCEDURE — 1126F PR PAIN SEVERITY QUANTIFIED, NO PAIN PRESENT: ICD-10-PCS | Mod: S$GLB,,, | Performed by: INTERNAL MEDICINE

## 2021-05-17 PROCEDURE — 1159F MED LIST DOCD IN RCRD: CPT | Mod: S$GLB,,, | Performed by: INTERNAL MEDICINE

## 2021-05-17 PROCEDURE — 1126F AMNT PAIN NOTED NONE PRSNT: CPT | Mod: S$GLB,,, | Performed by: INTERNAL MEDICINE

## 2021-05-17 PROCEDURE — 99212 PR OFFICE/OUTPT VISIT, EST, LEVL II, 10-19 MIN: ICD-10-PCS | Mod: S$GLB,,, | Performed by: INTERNAL MEDICINE

## 2021-05-17 PROCEDURE — 99212 OFFICE O/P EST SF 10 MIN: CPT | Mod: S$GLB,,, | Performed by: INTERNAL MEDICINE

## 2021-05-17 PROCEDURE — 99999 PR PBB SHADOW E&M-EST. PATIENT-LVL IV: CPT | Mod: PBBFAC,,, | Performed by: INTERNAL MEDICINE

## 2021-05-24 ENCOUNTER — LAB VISIT (OUTPATIENT)
Dept: LAB | Facility: HOSPITAL | Age: 76
End: 2021-05-24
Attending: FAMILY MEDICINE
Payer: MEDICARE

## 2021-05-24 DIAGNOSIS — Z79.4 TYPE 2 DIABETES MELLITUS WITH HYPERGLYCEMIA, WITH LONG-TERM CURRENT USE OF INSULIN: ICD-10-CM

## 2021-05-24 DIAGNOSIS — E78.5 DYSLIPIDEMIA: ICD-10-CM

## 2021-05-24 DIAGNOSIS — E11.65 TYPE 2 DIABETES MELLITUS WITH HYPERGLYCEMIA, WITH LONG-TERM CURRENT USE OF INSULIN: ICD-10-CM

## 2021-05-24 LAB
ALBUMIN SERPL BCP-MCNC: 3.5 G/DL (ref 3.5–5.2)
ALP SERPL-CCNC: 80 U/L (ref 55–135)
ALT SERPL W/O P-5'-P-CCNC: 12 U/L (ref 10–44)
ANION GAP SERPL CALC-SCNC: 9 MMOL/L (ref 8–16)
AST SERPL-CCNC: 16 U/L (ref 10–40)
BILIRUB SERPL-MCNC: 0.4 MG/DL (ref 0.1–1)
BUN SERPL-MCNC: 23 MG/DL (ref 8–23)
CALCIUM SERPL-MCNC: 8.8 MG/DL (ref 8.7–10.5)
CHLORIDE SERPL-SCNC: 104 MMOL/L (ref 95–110)
CHOLEST SERPL-MCNC: 127 MG/DL (ref 120–199)
CHOLEST/HDLC SERPL: 2 {RATIO} (ref 2–5)
CO2 SERPL-SCNC: 25 MMOL/L (ref 23–29)
CREAT SERPL-MCNC: 1.2 MG/DL (ref 0.5–1.4)
EST. GFR  (AFRICAN AMERICAN): >60 ML/MIN/1.73 M^2
EST. GFR  (NON AFRICAN AMERICAN): 58 ML/MIN/1.73 M^2
ESTIMATED AVG GLUCOSE: 246 MG/DL (ref 68–131)
GLUCOSE SERPL-MCNC: 312 MG/DL (ref 70–110)
HBA1C MFR BLD: 10.2 % (ref 4–5.6)
HDLC SERPL-MCNC: 62 MG/DL (ref 40–75)
HDLC SERPL: 48.8 % (ref 20–50)
LDLC SERPL CALC-MCNC: 57 MG/DL (ref 63–159)
NONHDLC SERPL-MCNC: 65 MG/DL
POTASSIUM SERPL-SCNC: 4.6 MMOL/L (ref 3.5–5.1)
PROT SERPL-MCNC: 6.8 G/DL (ref 6–8.4)
SODIUM SERPL-SCNC: 138 MMOL/L (ref 136–145)
TRIGL SERPL-MCNC: 40 MG/DL (ref 30–150)
TSH SERPL DL<=0.005 MIU/L-ACNC: 1.21 UIU/ML (ref 0.4–4)

## 2021-05-24 PROCEDURE — 80053 COMPREHEN METABOLIC PANEL: CPT | Performed by: FAMILY MEDICINE

## 2021-05-24 PROCEDURE — 80061 LIPID PANEL: CPT | Performed by: FAMILY MEDICINE

## 2021-05-24 PROCEDURE — 36415 COLL VENOUS BLD VENIPUNCTURE: CPT | Mod: PO | Performed by: FAMILY MEDICINE

## 2021-05-24 PROCEDURE — 84443 ASSAY THYROID STIM HORMONE: CPT | Performed by: FAMILY MEDICINE

## 2021-05-24 PROCEDURE — 83036 HEMOGLOBIN GLYCOSYLATED A1C: CPT | Performed by: FAMILY MEDICINE

## 2021-05-26 ENCOUNTER — OFFICE VISIT (OUTPATIENT)
Dept: FAMILY MEDICINE | Facility: CLINIC | Age: 76
End: 2021-05-26
Payer: MEDICARE

## 2021-05-26 VITALS
BODY MASS INDEX: 26.34 KG/M2 | WEIGHT: 205.25 LBS | DIASTOLIC BLOOD PRESSURE: 64 MMHG | OXYGEN SATURATION: 95 % | HEART RATE: 85 BPM | SYSTOLIC BLOOD PRESSURE: 120 MMHG | HEIGHT: 74 IN

## 2021-05-26 DIAGNOSIS — E11.65 TYPE 2 DIABETES MELLITUS WITH HYPERGLYCEMIA, WITH LONG-TERM CURRENT USE OF INSULIN: ICD-10-CM

## 2021-05-26 DIAGNOSIS — Z79.4 TYPE 2 DIABETES MELLITUS WITH HYPERGLYCEMIA, WITH LONG-TERM CURRENT USE OF INSULIN: ICD-10-CM

## 2021-05-26 DIAGNOSIS — N18.31 STAGE 3A CHRONIC KIDNEY DISEASE: ICD-10-CM

## 2021-05-26 DIAGNOSIS — I10 ESSENTIAL HYPERTENSION: Primary | ICD-10-CM

## 2021-05-26 PROCEDURE — 1101F PR PT FALLS ASSESS DOC 0-1 FALLS W/OUT INJ PAST YR: ICD-10-PCS | Mod: CPTII,S$GLB,, | Performed by: FAMILY MEDICINE

## 2021-05-26 PROCEDURE — 3078F PR MOST RECENT DIASTOLIC BLOOD PRESSURE < 80 MM HG: ICD-10-PCS | Mod: CPTII,S$GLB,, | Performed by: FAMILY MEDICINE

## 2021-05-26 PROCEDURE — 3074F SYST BP LT 130 MM HG: CPT | Mod: CPTII,S$GLB,, | Performed by: FAMILY MEDICINE

## 2021-05-26 PROCEDURE — 1101F PT FALLS ASSESS-DOCD LE1/YR: CPT | Mod: CPTII,S$GLB,, | Performed by: FAMILY MEDICINE

## 2021-05-26 PROCEDURE — 1125F AMNT PAIN NOTED PAIN PRSNT: CPT | Mod: S$GLB,,, | Performed by: FAMILY MEDICINE

## 2021-05-26 PROCEDURE — 99499 UNLISTED E&M SERVICE: CPT | Mod: S$GLB,,, | Performed by: FAMILY MEDICINE

## 2021-05-26 PROCEDURE — 1159F PR MEDICATION LIST DOCUMENTED IN MEDICAL RECORD: ICD-10-PCS | Mod: S$GLB,,, | Performed by: FAMILY MEDICINE

## 2021-05-26 PROCEDURE — 3288F PR FALLS RISK ASSESSMENT DOCUMENTED: ICD-10-PCS | Mod: CPTII,S$GLB,, | Performed by: FAMILY MEDICINE

## 2021-05-26 PROCEDURE — 3078F DIAST BP <80 MM HG: CPT | Mod: CPTII,S$GLB,, | Performed by: FAMILY MEDICINE

## 2021-05-26 PROCEDURE — 3074F PR MOST RECENT SYSTOLIC BLOOD PRESSURE < 130 MM HG: ICD-10-PCS | Mod: CPTII,S$GLB,, | Performed by: FAMILY MEDICINE

## 2021-05-26 PROCEDURE — 3288F FALL RISK ASSESSMENT DOCD: CPT | Mod: CPTII,S$GLB,, | Performed by: FAMILY MEDICINE

## 2021-05-26 PROCEDURE — 1125F PR PAIN SEVERITY QUANTIFIED, PAIN PRESENT: ICD-10-PCS | Mod: S$GLB,,, | Performed by: FAMILY MEDICINE

## 2021-05-26 PROCEDURE — 99499 RISK ADDL DX/OHS AUDIT: ICD-10-PCS | Mod: S$GLB,,, | Performed by: FAMILY MEDICINE

## 2021-05-26 PROCEDURE — 99999 PR PBB SHADOW E&M-EST. PATIENT-LVL IV: CPT | Mod: PBBFAC,,, | Performed by: FAMILY MEDICINE

## 2021-05-26 PROCEDURE — 1159F MED LIST DOCD IN RCRD: CPT | Mod: S$GLB,,, | Performed by: FAMILY MEDICINE

## 2021-05-26 PROCEDURE — 99999 PR PBB SHADOW E&M-EST. PATIENT-LVL IV: ICD-10-PCS | Mod: PBBFAC,,, | Performed by: FAMILY MEDICINE

## 2021-05-26 PROCEDURE — 99214 PR OFFICE/OUTPT VISIT, EST, LEVL IV, 30-39 MIN: ICD-10-PCS | Mod: S$GLB,,, | Performed by: FAMILY MEDICINE

## 2021-05-26 PROCEDURE — 99214 OFFICE O/P EST MOD 30 MIN: CPT | Mod: S$GLB,,, | Performed by: FAMILY MEDICINE

## 2021-05-26 RX ORDER — INSULIN ASPART 100 [IU]/ML
20 INJECTION, SOLUTION INTRAVENOUS; SUBCUTANEOUS
Qty: 54 ML | Refills: 3 | Status: SHIPPED | OUTPATIENT
Start: 2021-05-26 | End: 2021-05-26 | Stop reason: SDUPTHER

## 2021-05-26 RX ORDER — INSULIN ASPART 100 [IU]/ML
20 INJECTION, SOLUTION INTRAVENOUS; SUBCUTANEOUS
Qty: 54 ML | Refills: 3 | Status: SHIPPED | OUTPATIENT
Start: 2021-05-26 | End: 2021-10-02 | Stop reason: SDUPTHER

## 2021-07-29 NOTE — TELEPHONE ENCOUNTER
Reason for Disposition   Caller requesting a refill, no triage required, and triager able to refill per unit policy    Protocols used: ST MEDICATION QUESTION CALL-A-    Patient stated pharmacy does not have lantus prescription. Noted prescription with 11 refills from 1/6/19. Called into pharmacy. No other needs at this time.   No

## 2021-08-12 ENCOUNTER — TELEPHONE (OUTPATIENT)
Dept: NEPHROLOGY | Facility: CLINIC | Age: 76
End: 2021-08-12

## 2021-08-12 ENCOUNTER — HOSPITAL ENCOUNTER (OUTPATIENT)
Dept: RADIOLOGY | Facility: HOSPITAL | Age: 76
Discharge: HOME | End: 2021-08-12
Attending: INTERNAL MEDICINE
Payer: MEDICARE

## 2021-08-12 DIAGNOSIS — R91.1 SOLITARY PULMONARY NODULE: ICD-10-CM

## 2021-08-12 LAB
CREAT SERPL-MCNC: 1.1 MG/DL (ref 0.5–1.4)
SAMPLE: NORMAL

## 2021-08-23 ENCOUNTER — TELEPHONE (OUTPATIENT)
Dept: PULMONOLOGY | Facility: CLINIC | Age: 76
End: 2021-08-23

## 2021-08-26 ENCOUNTER — LAB VISIT (OUTPATIENT)
Dept: LAB | Facility: HOSPITAL | Age: 76
End: 2021-08-26
Attending: FAMILY MEDICINE
Payer: MEDICARE

## 2021-08-26 DIAGNOSIS — N18.31 STAGE 3A CHRONIC KIDNEY DISEASE: ICD-10-CM

## 2021-08-26 DIAGNOSIS — I10 ESSENTIAL HYPERTENSION: ICD-10-CM

## 2021-08-26 DIAGNOSIS — E11.65 TYPE 2 DIABETES MELLITUS WITH HYPERGLYCEMIA, WITH LONG-TERM CURRENT USE OF INSULIN: ICD-10-CM

## 2021-08-26 DIAGNOSIS — J44.89 COPD (CHRONIC OBSTRUCTIVE PULMONARY DISEASE) WITH CHRONIC BRONCHITIS: Primary | ICD-10-CM

## 2021-08-26 DIAGNOSIS — Z79.4 TYPE 2 DIABETES MELLITUS WITH HYPERGLYCEMIA, WITH LONG-TERM CURRENT USE OF INSULIN: ICD-10-CM

## 2021-08-26 DIAGNOSIS — R91.1 SOLITARY PULMONARY NODULE: ICD-10-CM

## 2021-08-26 LAB
ALBUMIN SERPL BCP-MCNC: 3.4 G/DL (ref 3.5–5.2)
ALP SERPL-CCNC: 84 U/L (ref 55–135)
ALT SERPL W/O P-5'-P-CCNC: 17 U/L (ref 10–44)
ANION GAP SERPL CALC-SCNC: 8 MMOL/L (ref 8–16)
AST SERPL-CCNC: 20 U/L (ref 10–40)
BILIRUB SERPL-MCNC: 0.8 MG/DL (ref 0.1–1)
BUN SERPL-MCNC: 11 MG/DL (ref 8–23)
CALCIUM SERPL-MCNC: 8.7 MG/DL (ref 8.7–10.5)
CHLORIDE SERPL-SCNC: 105 MMOL/L (ref 95–110)
CO2 SERPL-SCNC: 25 MMOL/L (ref 23–29)
CREAT SERPL-MCNC: 1 MG/DL (ref 0.5–1.4)
EST. GFR  (AFRICAN AMERICAN): >60 ML/MIN/1.73 M^2
EST. GFR  (NON AFRICAN AMERICAN): >60 ML/MIN/1.73 M^2
ESTIMATED AVG GLUCOSE: 266 MG/DL (ref 68–131)
GLUCOSE SERPL-MCNC: 190 MG/DL (ref 70–110)
HBA1C MFR BLD: 10.9 % (ref 4–5.6)
POTASSIUM SERPL-SCNC: 4.2 MMOL/L (ref 3.5–5.1)
PROT SERPL-MCNC: 6.4 G/DL (ref 6–8.4)
SODIUM SERPL-SCNC: 138 MMOL/L (ref 136–145)

## 2021-08-26 PROCEDURE — 80053 COMPREHEN METABOLIC PANEL: CPT | Performed by: FAMILY MEDICINE

## 2021-08-26 PROCEDURE — 83036 HEMOGLOBIN GLYCOSYLATED A1C: CPT | Performed by: FAMILY MEDICINE

## 2021-08-26 PROCEDURE — 36415 COLL VENOUS BLD VENIPUNCTURE: CPT | Mod: PO | Performed by: FAMILY MEDICINE

## 2021-09-13 ENCOUNTER — OFFICE VISIT (OUTPATIENT)
Dept: PULMONOLOGY | Facility: CLINIC | Age: 76
End: 2021-09-13
Payer: MEDICARE

## 2021-09-13 DIAGNOSIS — R91.8 PULMONARY NODULES: Primary | ICD-10-CM

## 2021-09-13 DIAGNOSIS — M15.9 PRIMARY OSTEOARTHRITIS INVOLVING MULTIPLE JOINTS: ICD-10-CM

## 2021-09-13 DIAGNOSIS — R91.1 SOLITARY PULMONARY NODULE: ICD-10-CM

## 2021-09-13 PROCEDURE — 99443 PR PHYSICIAN TELEPHONE EVALUATION 21-30 MIN: CPT | Mod: 95,,, | Performed by: INTERNAL MEDICINE

## 2021-09-13 PROCEDURE — 1160F RVW MEDS BY RX/DR IN RCRD: CPT | Mod: CPTII,95,, | Performed by: INTERNAL MEDICINE

## 2021-09-13 PROCEDURE — 99443 PR PHYSICIAN TELEPHONE EVALUATION 21-30 MIN: ICD-10-PCS | Mod: 95,,, | Performed by: INTERNAL MEDICINE

## 2021-09-13 PROCEDURE — 1159F MED LIST DOCD IN RCRD: CPT | Mod: CPTII,95,, | Performed by: INTERNAL MEDICINE

## 2021-09-13 PROCEDURE — 1159F PR MEDICATION LIST DOCUMENTED IN MEDICAL RECORD: ICD-10-PCS | Mod: CPTII,95,, | Performed by: INTERNAL MEDICINE

## 2021-09-13 PROCEDURE — 1160F PR REVIEW ALL MEDS BY PRESCRIBER/CLIN PHARMACIST DOCUMENTED: ICD-10-PCS | Mod: CPTII,95,, | Performed by: INTERNAL MEDICINE

## 2021-09-13 RX ORDER — CELECOXIB 200 MG/1
CAPSULE ORAL
Qty: 90 CAPSULE | Refills: 0 | Status: SHIPPED | OUTPATIENT
Start: 2021-09-13 | End: 2021-12-08

## 2021-09-17 ENCOUNTER — TELEPHONE (OUTPATIENT)
Dept: PULMONOLOGY | Facility: CLINIC | Age: 76
End: 2021-09-17

## 2021-09-23 ENCOUNTER — HOSPITAL ENCOUNTER (OUTPATIENT)
Dept: RADIOLOGY | Facility: HOSPITAL | Age: 76
Discharge: HOME | End: 2021-09-23
Attending: INTERNAL MEDICINE
Payer: MEDICARE

## 2021-09-23 DIAGNOSIS — R91.1 SOLITARY PULMONARY NODULE: ICD-10-CM

## 2021-09-23 PROCEDURE — 71250 CT THORAX DX C-: CPT | Mod: TC,HCNC

## 2021-09-23 PROCEDURE — 71250 CT CHEST WITHOUT CONTRAST: ICD-10-PCS | Mod: 26,HCNC,, | Performed by: RADIOLOGY

## 2021-09-23 PROCEDURE — 71250 CT THORAX DX C-: CPT | Mod: 26,HCNC,, | Performed by: RADIOLOGY

## 2021-09-24 ENCOUNTER — TELEPHONE (OUTPATIENT)
Dept: PULMONOLOGY | Facility: CLINIC | Age: 76
End: 2021-09-24

## 2021-09-27 ENCOUNTER — TELEPHONE (OUTPATIENT)
Dept: PULMONOLOGY | Facility: CLINIC | Age: 76
End: 2021-09-27

## 2021-09-27 DIAGNOSIS — R91.8 PULMONARY NODULES: Primary | ICD-10-CM

## 2021-09-27 DIAGNOSIS — R91.1 SOLITARY PULMONARY NODULE: ICD-10-CM

## 2021-10-02 ENCOUNTER — OFFICE VISIT (OUTPATIENT)
Dept: FAMILY MEDICINE | Facility: CLINIC | Age: 76
End: 2021-10-02
Payer: MEDICARE

## 2021-10-02 VITALS
HEIGHT: 74 IN | WEIGHT: 201.25 LBS | HEART RATE: 98 BPM | BODY MASS INDEX: 25.83 KG/M2 | DIASTOLIC BLOOD PRESSURE: 70 MMHG | OXYGEN SATURATION: 96 % | SYSTOLIC BLOOD PRESSURE: 128 MMHG

## 2021-10-02 DIAGNOSIS — E11.65 TYPE 2 DIABETES MELLITUS WITH HYPERGLYCEMIA, WITH LONG-TERM CURRENT USE OF INSULIN: ICD-10-CM

## 2021-10-02 DIAGNOSIS — E11.22 TYPE 2 DIABETES MELLITUS WITH STAGE 3 CHRONIC KIDNEY DISEASE, WITH LONG-TERM CURRENT USE OF INSULIN: ICD-10-CM

## 2021-10-02 DIAGNOSIS — N18.30 TYPE 2 DIABETES MELLITUS WITH STAGE 3 CHRONIC KIDNEY DISEASE, WITH LONG-TERM CURRENT USE OF INSULIN: ICD-10-CM

## 2021-10-02 DIAGNOSIS — Z79.4 TYPE 2 DIABETES MELLITUS WITH STAGE 3 CHRONIC KIDNEY DISEASE, WITH LONG-TERM CURRENT USE OF INSULIN: ICD-10-CM

## 2021-10-02 DIAGNOSIS — Z79.4 TYPE 2 DIABETES MELLITUS WITH HYPERGLYCEMIA, WITH LONG-TERM CURRENT USE OF INSULIN: ICD-10-CM

## 2021-10-02 PROCEDURE — 1101F PR PT FALLS ASSESS DOC 0-1 FALLS W/OUT INJ PAST YR: ICD-10-PCS | Mod: HCNC,CPTII,S$GLB, | Performed by: FAMILY MEDICINE

## 2021-10-02 PROCEDURE — 1126F PR PAIN SEVERITY QUANTIFIED, NO PAIN PRESENT: ICD-10-PCS | Mod: HCNC,CPTII,S$GLB, | Performed by: FAMILY MEDICINE

## 2021-10-02 PROCEDURE — 3074F PR MOST RECENT SYSTOLIC BLOOD PRESSURE < 130 MM HG: ICD-10-PCS | Mod: HCNC,CPTII,S$GLB, | Performed by: FAMILY MEDICINE

## 2021-10-02 PROCEDURE — 99499 UNLISTED E&M SERVICE: CPT | Mod: HCNC,S$GLB,, | Performed by: FAMILY MEDICINE

## 2021-10-02 PROCEDURE — 1126F AMNT PAIN NOTED NONE PRSNT: CPT | Mod: HCNC,CPTII,S$GLB, | Performed by: FAMILY MEDICINE

## 2021-10-02 PROCEDURE — 99999 PR PBB SHADOW E&M-EST. PATIENT-LVL IV: ICD-10-PCS | Mod: PBBFAC,HCNC,, | Performed by: FAMILY MEDICINE

## 2021-10-02 PROCEDURE — 99214 OFFICE O/P EST MOD 30 MIN: CPT | Mod: HCNC,S$GLB,, | Performed by: FAMILY MEDICINE

## 2021-10-02 PROCEDURE — 99214 PR OFFICE/OUTPT VISIT, EST, LEVL IV, 30-39 MIN: ICD-10-PCS | Mod: HCNC,S$GLB,, | Performed by: FAMILY MEDICINE

## 2021-10-02 PROCEDURE — 99999 PR PBB SHADOW E&M-EST. PATIENT-LVL IV: CPT | Mod: PBBFAC,HCNC,, | Performed by: FAMILY MEDICINE

## 2021-10-02 PROCEDURE — 1159F PR MEDICATION LIST DOCUMENTED IN MEDICAL RECORD: ICD-10-PCS | Mod: HCNC,CPTII,S$GLB, | Performed by: FAMILY MEDICINE

## 2021-10-02 PROCEDURE — 3288F FALL RISK ASSESSMENT DOCD: CPT | Mod: HCNC,CPTII,S$GLB, | Performed by: FAMILY MEDICINE

## 2021-10-02 PROCEDURE — 3074F SYST BP LT 130 MM HG: CPT | Mod: HCNC,CPTII,S$GLB, | Performed by: FAMILY MEDICINE

## 2021-10-02 PROCEDURE — 99499 RISK ADDL DX/OHS AUDIT: ICD-10-PCS | Mod: HCNC,S$GLB,, | Performed by: FAMILY MEDICINE

## 2021-10-02 PROCEDURE — 1101F PT FALLS ASSESS-DOCD LE1/YR: CPT | Mod: HCNC,CPTII,S$GLB, | Performed by: FAMILY MEDICINE

## 2021-10-02 PROCEDURE — 1159F MED LIST DOCD IN RCRD: CPT | Mod: HCNC,CPTII,S$GLB, | Performed by: FAMILY MEDICINE

## 2021-10-02 PROCEDURE — 3078F PR MOST RECENT DIASTOLIC BLOOD PRESSURE < 80 MM HG: ICD-10-PCS | Mod: HCNC,CPTII,S$GLB, | Performed by: FAMILY MEDICINE

## 2021-10-02 PROCEDURE — 3288F PR FALLS RISK ASSESSMENT DOCUMENTED: ICD-10-PCS | Mod: HCNC,CPTII,S$GLB, | Performed by: FAMILY MEDICINE

## 2021-10-02 PROCEDURE — 3078F DIAST BP <80 MM HG: CPT | Mod: HCNC,CPTII,S$GLB, | Performed by: FAMILY MEDICINE

## 2021-10-02 RX ORDER — INSULIN ASPART 100 [IU]/ML
17 INJECTION, SOLUTION INTRAVENOUS; SUBCUTANEOUS
Qty: 45.9 ML | Refills: 3 | Status: SHIPPED | OUTPATIENT
Start: 2021-10-02 | End: 2022-01-05 | Stop reason: SDUPTHER

## 2021-10-02 RX ORDER — PEN NEEDLE, DIABETIC 30 GX3/16"
1 NEEDLE, DISPOSABLE MISCELLANEOUS 3 TIMES DAILY
Qty: 100 EACH | Refills: 3 | Status: SHIPPED | OUTPATIENT
Start: 2021-10-02 | End: 2024-02-29 | Stop reason: ALTCHOICE

## 2021-10-02 RX ORDER — INSULIN GLARGINE 100 [IU]/ML
28 INJECTION, SOLUTION SUBCUTANEOUS NIGHTLY
Qty: 25.2 ML | Refills: 3 | Status: SHIPPED | OUTPATIENT
Start: 2021-10-02 | End: 2022-01-05 | Stop reason: SDUPTHER

## 2021-12-01 ENCOUNTER — HOSPITAL ENCOUNTER (OUTPATIENT)
Dept: RADIOLOGY | Facility: HOSPITAL | Age: 76
Discharge: HOME | End: 2021-12-01
Attending: INTERNAL MEDICINE
Payer: MEDICARE

## 2021-12-01 DIAGNOSIS — R91.1 SOLITARY PULMONARY NODULE: ICD-10-CM

## 2021-12-01 PROCEDURE — 71250 CT CHEST WITHOUT CONTRAST: ICD-10-PCS | Mod: 26,HCNC,, | Performed by: RADIOLOGY

## 2021-12-01 PROCEDURE — 71250 CT THORAX DX C-: CPT | Mod: TC,HCNC

## 2021-12-01 PROCEDURE — 71250 CT THORAX DX C-: CPT | Mod: 26,HCNC,, | Performed by: RADIOLOGY

## 2021-12-13 ENCOUNTER — LAB VISIT (OUTPATIENT)
Dept: LAB | Facility: HOSPITAL | Age: 76
End: 2021-12-13
Attending: INTERNAL MEDICINE
Payer: MEDICARE

## 2021-12-13 ENCOUNTER — OFFICE VISIT (OUTPATIENT)
Dept: PULMONOLOGY | Facility: CLINIC | Age: 76
End: 2021-12-13
Payer: MEDICARE

## 2021-12-13 VITALS
WEIGHT: 201.94 LBS | HEIGHT: 73 IN | SYSTOLIC BLOOD PRESSURE: 147 MMHG | OXYGEN SATURATION: 96 % | TEMPERATURE: 98 F | RESPIRATION RATE: 18 BRPM | BODY MASS INDEX: 26.76 KG/M2 | DIASTOLIC BLOOD PRESSURE: 79 MMHG | HEART RATE: 80 BPM

## 2021-12-13 DIAGNOSIS — R91.8 PULMONARY NODULES: Primary | ICD-10-CM

## 2021-12-13 DIAGNOSIS — R91.8 PULMONARY NODULES: ICD-10-CM

## 2021-12-13 PROCEDURE — 36415 COLL VENOUS BLD VENIPUNCTURE: CPT | Mod: HCNC | Performed by: INTERNAL MEDICINE

## 2021-12-13 PROCEDURE — 99213 OFFICE O/P EST LOW 20 MIN: CPT | Mod: HCNC,S$GLB,, | Performed by: INTERNAL MEDICINE

## 2021-12-13 PROCEDURE — 86480 TB TEST CELL IMMUN MEASURE: CPT | Mod: HCNC | Performed by: INTERNAL MEDICINE

## 2021-12-13 PROCEDURE — 99999 PR PBB SHADOW E&M-EST. PATIENT-LVL IV: ICD-10-PCS | Mod: PBBFAC,HCNC,, | Performed by: INTERNAL MEDICINE

## 2021-12-13 PROCEDURE — 99999 PR PBB SHADOW E&M-EST. PATIENT-LVL IV: CPT | Mod: PBBFAC,HCNC,, | Performed by: INTERNAL MEDICINE

## 2021-12-13 PROCEDURE — 99213 PR OFFICE/OUTPT VISIT, EST, LEVL III, 20-29 MIN: ICD-10-PCS | Mod: HCNC,S$GLB,, | Performed by: INTERNAL MEDICINE

## 2021-12-15 LAB
GAMMA INTERFERON BACKGROUND BLD IA-ACNC: 0.05 IU/ML
M TB IFN-G CD4+ BCKGRND COR BLD-ACNC: -0.01 IU/ML
MITOGEN IGNF BCKGRD COR BLD-ACNC: 2.71 IU/ML
TB GOLD PLUS: NEGATIVE
TB2 - NIL: -0.01 IU/ML

## 2021-12-16 ENCOUNTER — LAB VISIT (OUTPATIENT)
Dept: LAB | Facility: HOSPITAL | Age: 76
End: 2021-12-16
Attending: INTERNAL MEDICINE
Payer: MEDICARE

## 2021-12-16 DIAGNOSIS — R91.8 PULMONARY NODULES: ICD-10-CM

## 2021-12-16 PROCEDURE — 87015 SPECIMEN INFECT AGNT CONCNTJ: CPT | Mod: 59,HCNC | Performed by: INTERNAL MEDICINE

## 2021-12-16 PROCEDURE — 87206 SMEAR FLUORESCENT/ACID STAI: CPT | Mod: 91,HCNC | Performed by: INTERNAL MEDICINE

## 2021-12-16 PROCEDURE — 87149 DNA/RNA DIRECT PROBE: CPT | Performed by: INTERNAL MEDICINE

## 2021-12-16 PROCEDURE — 87118 MYCOBACTERIC IDENTIFICATION: CPT | Mod: 59 | Performed by: INTERNAL MEDICINE

## 2021-12-16 PROCEDURE — 87116 MYCOBACTERIA CULTURE: CPT | Mod: HCNC | Performed by: INTERNAL MEDICINE

## 2021-12-29 ENCOUNTER — LAB VISIT (OUTPATIENT)
Dept: LAB | Facility: HOSPITAL | Age: 76
End: 2021-12-29
Attending: FAMILY MEDICINE
Payer: MEDICARE

## 2021-12-29 DIAGNOSIS — N18.30 TYPE 2 DIABETES MELLITUS WITH STAGE 3 CHRONIC KIDNEY DISEASE, WITH LONG-TERM CURRENT USE OF INSULIN: ICD-10-CM

## 2021-12-29 DIAGNOSIS — E11.65 TYPE 2 DIABETES MELLITUS WITH HYPERGLYCEMIA, WITH LONG-TERM CURRENT USE OF INSULIN: ICD-10-CM

## 2021-12-29 DIAGNOSIS — Z79.4 TYPE 2 DIABETES MELLITUS WITH STAGE 3 CHRONIC KIDNEY DISEASE, WITH LONG-TERM CURRENT USE OF INSULIN: ICD-10-CM

## 2021-12-29 DIAGNOSIS — Z79.4 TYPE 2 DIABETES MELLITUS WITH HYPERGLYCEMIA, WITH LONG-TERM CURRENT USE OF INSULIN: ICD-10-CM

## 2021-12-29 DIAGNOSIS — E11.22 TYPE 2 DIABETES MELLITUS WITH STAGE 3 CHRONIC KIDNEY DISEASE, WITH LONG-TERM CURRENT USE OF INSULIN: ICD-10-CM

## 2021-12-29 LAB
ALBUMIN SERPL BCP-MCNC: 3.3 G/DL (ref 3.5–5.2)
ALP SERPL-CCNC: 98 U/L (ref 55–135)
ALT SERPL W/O P-5'-P-CCNC: 16 U/L (ref 10–44)
ANION GAP SERPL CALC-SCNC: 6 MMOL/L (ref 8–16)
AST SERPL-CCNC: 21 U/L (ref 10–40)
BILIRUB SERPL-MCNC: 0.5 MG/DL (ref 0.1–1)
BUN SERPL-MCNC: 16 MG/DL (ref 8–23)
CALCIUM SERPL-MCNC: 9 MG/DL (ref 8.7–10.5)
CHLORIDE SERPL-SCNC: 103 MMOL/L (ref 95–110)
CHOLEST SERPL-MCNC: 158 MG/DL (ref 120–199)
CHOLEST/HDLC SERPL: 2.6 {RATIO} (ref 2–5)
CO2 SERPL-SCNC: 30 MMOL/L (ref 23–29)
CREAT SERPL-MCNC: 1.1 MG/DL (ref 0.5–1.4)
EST. GFR  (AFRICAN AMERICAN): >60 ML/MIN/1.73 M^2
EST. GFR  (NON AFRICAN AMERICAN): >60 ML/MIN/1.73 M^2
ESTIMATED AVG GLUCOSE: 306 MG/DL (ref 68–131)
GLUCOSE SERPL-MCNC: 253 MG/DL (ref 70–110)
HBA1C MFR BLD: 12.3 % (ref 4–5.6)
HDLC SERPL-MCNC: 61 MG/DL (ref 40–75)
HDLC SERPL: 38.6 % (ref 20–50)
LDLC SERPL CALC-MCNC: 81.6 MG/DL (ref 63–159)
NONHDLC SERPL-MCNC: 97 MG/DL
POTASSIUM SERPL-SCNC: 4.5 MMOL/L (ref 3.5–5.1)
PROT SERPL-MCNC: 7 G/DL (ref 6–8.4)
SODIUM SERPL-SCNC: 139 MMOL/L (ref 136–145)
TRIGL SERPL-MCNC: 77 MG/DL (ref 30–150)

## 2021-12-29 PROCEDURE — 83036 HEMOGLOBIN GLYCOSYLATED A1C: CPT | Mod: HCNC | Performed by: FAMILY MEDICINE

## 2021-12-29 PROCEDURE — 80061 LIPID PANEL: CPT | Mod: HCNC | Performed by: FAMILY MEDICINE

## 2021-12-29 PROCEDURE — 80053 COMPREHEN METABOLIC PANEL: CPT | Mod: HCNC | Performed by: FAMILY MEDICINE

## 2021-12-29 PROCEDURE — 36415 COLL VENOUS BLD VENIPUNCTURE: CPT | Mod: HCNC,PO | Performed by: FAMILY MEDICINE

## 2022-01-05 ENCOUNTER — OFFICE VISIT (OUTPATIENT)
Dept: FAMILY MEDICINE | Facility: CLINIC | Age: 77
End: 2022-01-05
Payer: MEDICARE

## 2022-01-05 VITALS
DIASTOLIC BLOOD PRESSURE: 66 MMHG | WEIGHT: 203.06 LBS | SYSTOLIC BLOOD PRESSURE: 140 MMHG | BODY MASS INDEX: 26.91 KG/M2 | HEART RATE: 92 BPM | HEIGHT: 73 IN | OXYGEN SATURATION: 95 %

## 2022-01-05 DIAGNOSIS — E13.40 NEUROPATHY DUE TO SECONDARY DIABETES: Primary | ICD-10-CM

## 2022-01-05 DIAGNOSIS — I27.9 CHRONIC PULMONARY HEART DISEASE: ICD-10-CM

## 2022-01-05 DIAGNOSIS — Z79.4 TYPE 2 DIABETES MELLITUS WITH HYPERGLYCEMIA, WITH LONG-TERM CURRENT USE OF INSULIN: ICD-10-CM

## 2022-01-05 DIAGNOSIS — R94.31 ABNORMAL EKG: ICD-10-CM

## 2022-01-05 DIAGNOSIS — E11.51 TYPE 2 DIABETES MELLITUS WITH DIABETIC PERIPHERAL ANGIOPATHY WITHOUT GANGRENE, WITH LONG-TERM CURRENT USE OF INSULIN: ICD-10-CM

## 2022-01-05 DIAGNOSIS — J43.8 OTHER EMPHYSEMA: ICD-10-CM

## 2022-01-05 DIAGNOSIS — I10 PRIMARY HYPERTENSION: ICD-10-CM

## 2022-01-05 DIAGNOSIS — E11.22 TYPE 2 DIABETES MELLITUS WITH STAGE 3 CHRONIC KIDNEY DISEASE, WITH LONG-TERM CURRENT USE OF INSULIN: ICD-10-CM

## 2022-01-05 DIAGNOSIS — I10 ESSENTIAL HYPERTENSION: ICD-10-CM

## 2022-01-05 DIAGNOSIS — E11.65 TYPE 2 DIABETES MELLITUS WITH HYPERGLYCEMIA, WITH LONG-TERM CURRENT USE OF INSULIN: ICD-10-CM

## 2022-01-05 DIAGNOSIS — R07.89 ATYPICAL CHEST PAIN: ICD-10-CM

## 2022-01-05 DIAGNOSIS — Z79.4 TYPE 2 DIABETES MELLITUS WITH STAGE 3 CHRONIC KIDNEY DISEASE, WITH LONG-TERM CURRENT USE OF INSULIN: ICD-10-CM

## 2022-01-05 DIAGNOSIS — Z79.4 TYPE 2 DIABETES MELLITUS WITH DIABETIC PERIPHERAL ANGIOPATHY WITHOUT GANGRENE, WITH LONG-TERM CURRENT USE OF INSULIN: ICD-10-CM

## 2022-01-05 DIAGNOSIS — N18.30 TYPE 2 DIABETES MELLITUS WITH STAGE 3 CHRONIC KIDNEY DISEASE, WITH LONG-TERM CURRENT USE OF INSULIN: ICD-10-CM

## 2022-01-05 PROCEDURE — 3078F DIAST BP <80 MM HG: CPT | Mod: HCNC,CPTII,S$GLB, | Performed by: FAMILY MEDICINE

## 2022-01-05 PROCEDURE — 99999 PR PBB SHADOW E&M-EST. PATIENT-LVL IV: ICD-10-PCS | Mod: PBBFAC,HCNC,, | Performed by: FAMILY MEDICINE

## 2022-01-05 PROCEDURE — 99499 UNLISTED E&M SERVICE: CPT | Mod: S$GLB,,, | Performed by: FAMILY MEDICINE

## 2022-01-05 PROCEDURE — 1125F AMNT PAIN NOTED PAIN PRSNT: CPT | Mod: HCNC,CPTII,S$GLB, | Performed by: FAMILY MEDICINE

## 2022-01-05 PROCEDURE — 3288F FALL RISK ASSESSMENT DOCD: CPT | Mod: HCNC,CPTII,S$GLB, | Performed by: FAMILY MEDICINE

## 2022-01-05 PROCEDURE — 1159F MED LIST DOCD IN RCRD: CPT | Mod: HCNC,CPTII,S$GLB, | Performed by: FAMILY MEDICINE

## 2022-01-05 PROCEDURE — 99499 RISK ADDL DX/OHS AUDIT: ICD-10-PCS | Mod: S$GLB,,, | Performed by: FAMILY MEDICINE

## 2022-01-05 PROCEDURE — 3077F PR MOST RECENT SYSTOLIC BLOOD PRESSURE >= 140 MM HG: ICD-10-PCS | Mod: HCNC,CPTII,S$GLB, | Performed by: FAMILY MEDICINE

## 2022-01-05 PROCEDURE — 99999 PR PBB SHADOW E&M-EST. PATIENT-LVL IV: CPT | Mod: PBBFAC,HCNC,, | Performed by: FAMILY MEDICINE

## 2022-01-05 PROCEDURE — 3078F PR MOST RECENT DIASTOLIC BLOOD PRESSURE < 80 MM HG: ICD-10-PCS | Mod: HCNC,CPTII,S$GLB, | Performed by: FAMILY MEDICINE

## 2022-01-05 PROCEDURE — 1101F PT FALLS ASSESS-DOCD LE1/YR: CPT | Mod: HCNC,CPTII,S$GLB, | Performed by: FAMILY MEDICINE

## 2022-01-05 PROCEDURE — 3288F PR FALLS RISK ASSESSMENT DOCUMENTED: ICD-10-PCS | Mod: HCNC,CPTII,S$GLB, | Performed by: FAMILY MEDICINE

## 2022-01-05 PROCEDURE — 99214 PR OFFICE/OUTPT VISIT, EST, LEVL IV, 30-39 MIN: ICD-10-PCS | Mod: HCNC,S$GLB,, | Performed by: FAMILY MEDICINE

## 2022-01-05 PROCEDURE — 1101F PR PT FALLS ASSESS DOC 0-1 FALLS W/OUT INJ PAST YR: ICD-10-PCS | Mod: HCNC,CPTII,S$GLB, | Performed by: FAMILY MEDICINE

## 2022-01-05 PROCEDURE — 3077F SYST BP >= 140 MM HG: CPT | Mod: HCNC,CPTII,S$GLB, | Performed by: FAMILY MEDICINE

## 2022-01-05 PROCEDURE — 93010 EKG 12-LEAD: ICD-10-PCS | Mod: S$GLB,,, | Performed by: INTERNAL MEDICINE

## 2022-01-05 PROCEDURE — 93005 EKG 12-LEAD: ICD-10-PCS | Mod: S$GLB,,, | Performed by: FAMILY MEDICINE

## 2022-01-05 PROCEDURE — 93005 ELECTROCARDIOGRAM TRACING: CPT | Mod: S$GLB,,, | Performed by: FAMILY MEDICINE

## 2022-01-05 PROCEDURE — 99214 OFFICE O/P EST MOD 30 MIN: CPT | Mod: HCNC,S$GLB,, | Performed by: FAMILY MEDICINE

## 2022-01-05 PROCEDURE — 1159F PR MEDICATION LIST DOCUMENTED IN MEDICAL RECORD: ICD-10-PCS | Mod: HCNC,CPTII,S$GLB, | Performed by: FAMILY MEDICINE

## 2022-01-05 PROCEDURE — 93010 ELECTROCARDIOGRAM REPORT: CPT | Mod: S$GLB,,, | Performed by: INTERNAL MEDICINE

## 2022-01-05 PROCEDURE — 1125F PR PAIN SEVERITY QUANTIFIED, PAIN PRESENT: ICD-10-PCS | Mod: HCNC,CPTII,S$GLB, | Performed by: FAMILY MEDICINE

## 2022-01-05 RX ORDER — INSULIN GLARGINE 100 [IU]/ML
28 INJECTION, SOLUTION SUBCUTANEOUS NIGHTLY
Qty: 25.2 ML | Refills: 3 | Status: ON HOLD | OUTPATIENT
Start: 2022-01-05 | End: 2022-02-02 | Stop reason: SDUPTHER

## 2022-01-05 RX ORDER — LANCETS
1 EACH MISCELLANEOUS 2 TIMES DAILY
Qty: 200 EACH | Refills: 6 | Status: SHIPPED | OUTPATIENT
Start: 2022-01-05 | End: 2024-02-29 | Stop reason: ALTCHOICE

## 2022-01-05 RX ORDER — METFORMIN HYDROCHLORIDE 500 MG/1
500 TABLET ORAL 2 TIMES DAILY WITH MEALS
Qty: 180 TABLET | Refills: 3 | Status: ON HOLD | OUTPATIENT
Start: 2022-01-05 | End: 2022-03-24 | Stop reason: HOSPADM

## 2022-01-05 RX ORDER — INSULIN ASPART 100 [IU]/ML
17 INJECTION, SOLUTION INTRAVENOUS; SUBCUTANEOUS
Qty: 45.9 ML | Refills: 3 | Status: ON HOLD | OUTPATIENT
Start: 2022-01-05 | End: 2022-02-02 | Stop reason: HOSPADM

## 2022-01-05 RX ORDER — DILTIAZEM HYDROCHLORIDE 120 MG/1
120 CAPSULE, COATED, EXTENDED RELEASE ORAL DAILY
Qty: 90 CAPSULE | Refills: 3 | Status: ON HOLD | OUTPATIENT
Start: 2022-01-05 | End: 2022-03-10 | Stop reason: HOSPADM

## 2022-01-05 RX ORDER — GABAPENTIN 300 MG/1
300 CAPSULE ORAL 2 TIMES DAILY
Qty: 180 CAPSULE | Refills: 3 | Status: ON HOLD | OUTPATIENT
Start: 2022-01-05 | End: 2022-02-02 | Stop reason: HOSPADM

## 2022-01-05 RX ORDER — LOSARTAN POTASSIUM 25 MG/1
25 TABLET ORAL DAILY
Qty: 90 TABLET | Refills: 3 | Status: ON HOLD | OUTPATIENT
Start: 2022-01-05 | End: 2022-10-10 | Stop reason: SDUPTHER

## 2022-01-05 RX ORDER — PRAVASTATIN SODIUM 40 MG/1
40 TABLET ORAL NIGHTLY
Qty: 90 TABLET | Refills: 3 | Status: ON HOLD | OUTPATIENT
Start: 2022-01-05 | End: 2022-01-11 | Stop reason: HOSPADM

## 2022-01-05 NOTE — PROGRESS NOTES
Subjective:       Patient ID: Kamar Muñoz is a 76 y.o. male.    Chief Complaint: Diabetes and Hypertension    76 years old male came to the clinic for blood pressure check.  Blood pressure today was borderline.  No chest pain, palpitation, orthopnea or PND.  Patient with elevated blood sugar for the last 3 months.  He do not want any additional changes on the diabetes regimen.    Review of Systems   Constitutional: Negative.    HENT: Negative.    Eyes: Negative.    Respiratory: Negative.    Cardiovascular: Negative.  Negative for chest pain, palpitations, leg swelling and claudication.   Gastrointestinal: Negative.    Endocrine: Negative for cold intolerance, heat intolerance, polydipsia, polyphagia and polyuria.   Genitourinary: Negative.    Musculoskeletal: Negative.    Integumentary:  Negative.   Neurological: Positive for numbness.   Psychiatric/Behavioral: Negative.          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 - Primary    Relevant Medications    metFORMIN (GLUCOPHAGE) 500 MG tablet    gabapentin (NEURONTIN) 300 MG capsule    insulin aspart U-100 (NOVOLOG FLEXPEN U-100 INSULIN) 100 unit/mL (3 mL) InPn pen    insulin glargine (LANTUS) 100 unit/mL injection    Type 2 diabetes mellitus with hyperglycemia, with long-term current use of insulin    Relevant Medications    pravastatin (PRAVACHOL) 40 MG tablet    metFORMIN (GLUCOPHAGE) 500 MG tablet    insulin aspart U-100 (NOVOLOG FLEXPEN U-100 INSULIN) 100 unit/mL (3 mL) InPn pen    insulin glargine (LANTUS) 100 unit/mL injection    Chronic pulmonary heart disease    Type 2 diabetes mellitus with diabetic peripheral angiopathy without gangrene, with long-term current use of insulin    Relevant Medications    blood sugar diagnostic Strp    lancets  (ONETOUCH ULTRASOFT LANCETS) Misc    metFORMIN (GLUCOPHAGE) 500 MG tablet    insulin aspart U-100 (NOVOLOG FLEXPEN U-100 INSULIN) 100 unit/mL (3 mL) InPn pen    insulin glargine (LANTUS) 100 unit/mL injection      Other Visit Diagnoses     Atypical chest pain        Relevant Orders    IN OFFICE EKG 12-LEAD (to Muse)    Stress Echo Which stress agent will be used? Treadmill Exercise; Color Flow Doppler? Yes    Other emphysema        Type 2 diabetes mellitus with stage 3 chronic kidney disease, with long-term current use of insulin        Relevant Medications    metFORMIN (GLUCOPHAGE) 500 MG tablet    insulin aspart U-100 (NOVOLOG FLEXPEN U-100 INSULIN) 100 unit/mL (3 mL) InPn pen    insulin glargine (LANTUS) 100 unit/mL injection    Essential hypertension        Relevant Medications    diltiaZEM (CARDIZEM CD) 120 MG Cp24          Plan:         Kamar was seen today for diabetes and hypertension.    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    Primary hypertension  -     losartan (COZAAR) 25 MG tablet; Take 1 tablet (25 mg total) by mouth once daily.    Atypical chest pain  -     IN OFFICE EKG 12-LEAD (to Muse)  -     Stress Echo Which stress agent will be used? Treadmill Exercise; Color Flow Doppler? Yes; Future    Type 2 diabetes mellitus with diabetic peripheral angiopathy without gangrene, with long-term current use of insulin  -     blood sugar diagnostic Strp; 1 strip by Misc.(Non-Drug; Combo Route) route 2 (two) times a day.  -     lancets (ONETOUCH ULTRASOFT LANCETS) Misc; 1 lancet by Misc.(Non-Drug; Combo Route) route 2 (two) times a day.    Chronic pulmonary heart disease    Other emphysema    Type 2 diabetes mellitus with hyperglycemia, with long-term current use of insulin  -     pravastatin (PRAVACHOL) 40 MG tablet; Take 1 tablet (40 mg total) by mouth every  evening.  -     metFORMIN (GLUCOPHAGE) 500 MG tablet; Take 1 tablet (500 mg total) by mouth 2 (two) times daily with meals.  -     insulin aspart U-100 (NOVOLOG FLEXPEN U-100 INSULIN) 100 unit/mL (3 mL) InPn pen; Inject 17 Units into the skin 3 (three) times daily with meals.  -     insulin glargine (LANTUS) 100 unit/mL injection; Inject 28 Units into the skin every evening.    Type 2 diabetes mellitus with stage 3 chronic kidney disease, with long-term current use of insulin  -     metFORMIN (GLUCOPHAGE) 500 MG tablet; Take 1 tablet (500 mg total) by mouth 2 (two) times daily with meals.    Essential hypertension  -     diltiaZEM (CARDIZEM CD) 120 MG Cp24; Take 1 capsule (120 mg total) by mouth once daily.    Continue monitoring blood pressure at home, low sodium diet.    Continue monitoring blood sugar at home,ADA diet.    Cardiology referral was done.

## 2022-01-09 ENCOUNTER — HOSPITAL ENCOUNTER (INPATIENT)
Facility: HOSPITAL | Age: 77
LOS: 2 days | Discharge: HOME | DRG: 247 | End: 2022-01-11
Attending: EMERGENCY MEDICINE | Admitting: INTERNAL MEDICINE
Payer: MEDICARE

## 2022-01-09 DIAGNOSIS — I21.11 STEMI INVOLVING RIGHT CORONARY ARTERY: ICD-10-CM

## 2022-01-09 DIAGNOSIS — I21.3 ST ELEVATION MYOCARDIAL INFARCTION (STEMI), UNSPECIFIED ARTERY: ICD-10-CM

## 2022-01-09 DIAGNOSIS — R91.8 PULMONARY NODULES: Primary | ICD-10-CM

## 2022-01-09 DIAGNOSIS — I21.3 STEMI (ST ELEVATION MYOCARDIAL INFARCTION): ICD-10-CM

## 2022-01-09 DIAGNOSIS — I25.110 CORONARY ARTERY DISEASE INVOLVING NATIVE CORONARY ARTERY OF NATIVE HEART WITH UNSTABLE ANGINA PECTORIS: ICD-10-CM

## 2022-01-09 LAB
ALBUMIN SERPL BCP-MCNC: 3.7 G/DL (ref 3.5–5.2)
ALP SERPL-CCNC: 105 U/L (ref 55–135)
ALT SERPL W/O P-5'-P-CCNC: 30 U/L (ref 10–44)
ANION GAP SERPL CALC-SCNC: 12 MMOL/L (ref 8–16)
APTT BLDCRRT: 28.2 SEC (ref 21–32)
AST SERPL-CCNC: 66 U/L (ref 10–40)
BASOPHILS # BLD AUTO: 0.04 K/UL (ref 0–0.2)
BASOPHILS NFR BLD: 0.3 % (ref 0–1.9)
BILIRUB SERPL-MCNC: 1.4 MG/DL (ref 0.1–1)
BUN SERPL-MCNC: 17 MG/DL (ref 8–23)
CALCIUM SERPL-MCNC: 9.1 MG/DL (ref 8.7–10.5)
CHLORIDE SERPL-SCNC: 103 MMOL/L (ref 95–110)
CO2 SERPL-SCNC: 23 MMOL/L (ref 23–29)
CREAT SERPL-MCNC: 1.3 MG/DL (ref 0.5–1.4)
CTP QC/QA: YES
DIFFERENTIAL METHOD: ABNORMAL
EOSINOPHIL # BLD AUTO: 0 K/UL (ref 0–0.5)
EOSINOPHIL NFR BLD: 0.3 % (ref 0–8)
ERYTHROCYTE [DISTWIDTH] IN BLOOD BY AUTOMATED COUNT: 12.4 % (ref 11.5–14.5)
EST. GFR  (AFRICAN AMERICAN): >60 ML/MIN/1.73 M^2
EST. GFR  (NON AFRICAN AMERICAN): 52 ML/MIN/1.73 M^2
GLUCOSE SERPL-MCNC: 190 MG/DL (ref 70–110)
HCT VFR BLD AUTO: 43.1 % (ref 40–54)
HGB BLD-MCNC: 13.8 G/DL (ref 14–18)
IMM GRANULOCYTES # BLD AUTO: 0.04 K/UL (ref 0–0.04)
IMM GRANULOCYTES NFR BLD AUTO: 0.3 % (ref 0–0.5)
INR PPP: 1 (ref 0.8–1.2)
INR PPP: 1 (ref 0.8–1.2)
LYMPHOCYTES # BLD AUTO: 1.6 K/UL (ref 1–4.8)
LYMPHOCYTES NFR BLD: 13.4 % (ref 18–48)
MCH RBC QN AUTO: 28.4 PG (ref 27–31)
MCHC RBC AUTO-ENTMCNC: 32 G/DL (ref 32–36)
MCV RBC AUTO: 89 FL (ref 82–98)
MONOCYTES # BLD AUTO: 1.7 K/UL (ref 0.3–1)
MONOCYTES NFR BLD: 14.3 % (ref 4–15)
NEUTROPHILS # BLD AUTO: 8.5 K/UL (ref 1.8–7.7)
NEUTROPHILS NFR BLD: 71.4 % (ref 38–73)
NRBC BLD-RTO: 0 /100 WBC
PLATELET # BLD AUTO: 169 K/UL (ref 150–450)
PMV BLD AUTO: 10.2 FL (ref 9.2–12.9)
POCT GLUCOSE: 181 MG/DL (ref 70–110)
POCT GLUCOSE: 323 MG/DL (ref 70–110)
POCT GLUCOSE: 339 MG/DL (ref 70–110)
POCT GLUCOSE: 365 MG/DL (ref 70–110)
POCT GLUCOSE: 373 MG/DL (ref 70–110)
POTASSIUM SERPL-SCNC: 4.3 MMOL/L (ref 3.5–5.1)
PROT SERPL-MCNC: 7.5 G/DL (ref 6–8.4)
PROTHROMBIN TIME: 10.3 SEC (ref 9–12.5)
PROTHROMBIN TIME: 10.7 SEC (ref 9–12.5)
RBC # BLD AUTO: 4.86 M/UL (ref 4.6–6.2)
SARS-COV-2 RDRP RESP QL NAA+PROBE: NEGATIVE
SODIUM SERPL-SCNC: 138 MMOL/L (ref 136–145)
TROPONIN I SERPL DL<=0.01 NG/ML-MCNC: 18.43 NG/ML (ref 0–0.03)
WBC # BLD AUTO: 11.92 K/UL (ref 3.9–12.7)

## 2022-01-09 PROCEDURE — 96374 THER/PROPH/DIAG INJ IV PUSH: CPT | Mod: 59

## 2022-01-09 PROCEDURE — 93010 ELECTROCARDIOGRAM REPORT: CPT | Mod: HCNC,,, | Performed by: INTERNAL MEDICINE

## 2022-01-09 PROCEDURE — 93458 L HRT ARTERY/VENTRICLE ANGIO: CPT | Mod: 26,59,51,HCNC | Performed by: INTERNAL MEDICINE

## 2022-01-09 PROCEDURE — 20000000 HC ICU ROOM: Mod: HCNC

## 2022-01-09 PROCEDURE — 25000003 PHARM REV CODE 250: Mod: HCNC | Performed by: INTERNAL MEDICINE

## 2022-01-09 PROCEDURE — 93005 ELECTROCARDIOGRAM TRACING: CPT | Mod: HCNC

## 2022-01-09 PROCEDURE — 93010 EKG 12-LEAD: ICD-10-PCS | Mod: HCNC,,, | Performed by: INTERNAL MEDICINE

## 2022-01-09 PROCEDURE — 99152 PR MOD CONSCIOUS SEDATION, SAME PHYS, 5+ YRS, FIRST 15 MIN: ICD-10-PCS | Mod: HCNC,,, | Performed by: INTERNAL MEDICINE

## 2022-01-09 PROCEDURE — 85610 PROTHROMBIN TIME: CPT | Mod: HCNC | Performed by: EMERGENCY MEDICINE

## 2022-01-09 PROCEDURE — U0002 COVID-19 LAB TEST NON-CDC: HCPCS | Mod: HCNC | Performed by: EMERGENCY MEDICINE

## 2022-01-09 PROCEDURE — C1769 GUIDE WIRE: HCPCS | Mod: HCNC | Performed by: INTERNAL MEDICINE

## 2022-01-09 PROCEDURE — C1887 CATHETER, GUIDING: HCPCS | Mod: HCNC | Performed by: INTERNAL MEDICINE

## 2022-01-09 PROCEDURE — 99152 MOD SED SAME PHYS/QHP 5/>YRS: CPT | Mod: HCNC | Performed by: INTERNAL MEDICINE

## 2022-01-09 PROCEDURE — 85347 COAGULATION TIME ACTIVATED: CPT | Mod: HCNC | Performed by: INTERNAL MEDICINE

## 2022-01-09 PROCEDURE — 85025 COMPLETE CBC W/AUTO DIFF WBC: CPT | Mod: HCNC | Performed by: EMERGENCY MEDICINE

## 2022-01-09 PROCEDURE — 93010 ELECTROCARDIOGRAM REPORT: CPT | Mod: 76,HCNC,, | Performed by: INTERNAL MEDICINE

## 2022-01-09 PROCEDURE — 92941 PRQ TRLML REVSC TOT OCCL AMI: CPT | Mod: RC,HCNC,, | Performed by: INTERNAL MEDICINE

## 2022-01-09 PROCEDURE — 82962 GLUCOSE BLOOD TEST: CPT

## 2022-01-09 PROCEDURE — 36415 COLL VENOUS BLD VENIPUNCTURE: CPT | Mod: HCNC | Performed by: INTERNAL MEDICINE

## 2022-01-09 PROCEDURE — 27201423 OPTIME MED/SURG SUP & DEVICES STERILE SUPPLY: Mod: HCNC | Performed by: INTERNAL MEDICINE

## 2022-01-09 PROCEDURE — 92941 PR AMI ANY METHOD: ICD-10-PCS | Mod: RC,HCNC,, | Performed by: INTERNAL MEDICINE

## 2022-01-09 PROCEDURE — 27000221 HC OXYGEN, UP TO 24 HOURS: Mod: HCNC

## 2022-01-09 PROCEDURE — 63600175 PHARM REV CODE 636 W HCPCS: Mod: HCNC | Performed by: INTERNAL MEDICINE

## 2022-01-09 PROCEDURE — 84484 ASSAY OF TROPONIN QUANT: CPT | Mod: HCNC | Performed by: EMERGENCY MEDICINE

## 2022-01-09 PROCEDURE — C1874 STENT, COATED/COV W/DEL SYS: HCPCS | Mod: HCNC | Performed by: INTERNAL MEDICINE

## 2022-01-09 PROCEDURE — 99291 CRITICAL CARE FIRST HOUR: CPT | Mod: 25

## 2022-01-09 PROCEDURE — 25500020 PHARM REV CODE 255: Mod: HCNC | Performed by: INTERNAL MEDICINE

## 2022-01-09 PROCEDURE — 25000003 PHARM REV CODE 250: Mod: HCNC | Performed by: EMERGENCY MEDICINE

## 2022-01-09 PROCEDURE — 63600175 PHARM REV CODE 636 W HCPCS: Mod: HCNC | Performed by: EMERGENCY MEDICINE

## 2022-01-09 PROCEDURE — 99153 MOD SED SAME PHYS/QHP EA: CPT | Mod: HCNC | Performed by: INTERNAL MEDICINE

## 2022-01-09 PROCEDURE — C9606 PERC D-E COR REVASC W AMI S: HCPCS | Mod: RC,HCNC | Performed by: INTERNAL MEDICINE

## 2022-01-09 PROCEDURE — 80048 BASIC METABOLIC PNL TOTAL CA: CPT | Mod: HCNC | Performed by: INTERNAL MEDICINE

## 2022-01-09 PROCEDURE — C1725 CATH, TRANSLUMIN NON-LASER: HCPCS | Mod: HCNC | Performed by: INTERNAL MEDICINE

## 2022-01-09 PROCEDURE — 85730 THROMBOPLASTIN TIME PARTIAL: CPT | Mod: HCNC | Performed by: EMERGENCY MEDICINE

## 2022-01-09 PROCEDURE — 93458 PR CATH PLACE/CORON ANGIO, IMG SUPER/INTERP,W LEFT HEART VENTRICULOGRAPHY: ICD-10-PCS | Mod: 26,59,51,HCNC | Performed by: INTERNAL MEDICINE

## 2022-01-09 PROCEDURE — C1894 INTRO/SHEATH, NON-LASER: HCPCS | Mod: HCNC | Performed by: INTERNAL MEDICINE

## 2022-01-09 PROCEDURE — 80053 COMPREHEN METABOLIC PANEL: CPT | Mod: HCNC | Performed by: EMERGENCY MEDICINE

## 2022-01-09 PROCEDURE — 99223 PR INITIAL HOSPITAL CARE,LEVL III: ICD-10-PCS | Mod: AI,HCNC,, | Performed by: INTERNAL MEDICINE

## 2022-01-09 PROCEDURE — 85025 COMPLETE CBC W/AUTO DIFF WBC: CPT | Mod: 91,HCNC | Performed by: INTERNAL MEDICINE

## 2022-01-09 PROCEDURE — 93458 L HRT ARTERY/VENTRICLE ANGIO: CPT | Mod: 59,HCNC | Performed by: INTERNAL MEDICINE

## 2022-01-09 PROCEDURE — 99900035 HC TECH TIME PER 15 MIN (STAT): Mod: HCNC

## 2022-01-09 PROCEDURE — C9399 UNCLASSIFIED DRUGS OR BIOLOG: HCPCS | Mod: HCNC | Performed by: INTERNAL MEDICINE

## 2022-01-09 PROCEDURE — 99152 MOD SED SAME PHYS/QHP 5/>YRS: CPT | Mod: HCNC,,, | Performed by: INTERNAL MEDICINE

## 2022-01-09 PROCEDURE — 25000003 PHARM REV CODE 250: Mod: HCNC | Performed by: SURGERY

## 2022-01-09 PROCEDURE — 94761 N-INVAS EAR/PLS OXIMETRY MLT: CPT | Mod: HCNC

## 2022-01-09 PROCEDURE — 99223 1ST HOSP IP/OBS HIGH 75: CPT | Mod: AI,HCNC,, | Performed by: INTERNAL MEDICINE

## 2022-01-09 DEVICE — STENT RONYX30008UX RESOLUTE ONYX 3.00X08
Type: IMPLANTABLE DEVICE | Site: CHEST | Status: FUNCTIONAL
Brand: RESOLUTE ONYX™

## 2022-01-09 DEVICE — STENT RONYX30030UX RESOLUTE ONYX 3.00X30
Type: IMPLANTABLE DEVICE | Site: CHEST | Status: FUNCTIONAL
Brand: RESOLUTE ONYX™

## 2022-01-09 RX ORDER — METHYLPREDNISOLONE SOD SUCC 125 MG
125 VIAL (EA) INJECTION ONCE
Status: COMPLETED | OUTPATIENT
Start: 2022-01-09 | End: 2022-01-09

## 2022-01-09 RX ORDER — ATORVASTATIN CALCIUM 40 MG/1
40 TABLET, FILM COATED ORAL DAILY
Status: DISCONTINUED | OUTPATIENT
Start: 2022-01-09 | End: 2022-01-11 | Stop reason: HOSPADM

## 2022-01-09 RX ORDER — MORPHINE SULFATE 2 MG/ML
2 INJECTION, SOLUTION INTRAMUSCULAR; INTRAVENOUS
Status: COMPLETED | OUTPATIENT
Start: 2022-01-09 | End: 2022-01-09

## 2022-01-09 RX ORDER — TALC
6 POWDER (GRAM) TOPICAL NIGHTLY PRN
Status: DISCONTINUED | OUTPATIENT
Start: 2022-01-09 | End: 2022-01-11 | Stop reason: HOSPADM

## 2022-01-09 RX ORDER — HEPARIN SODIUM,PORCINE/D5W 25000/250
0-40 INTRAVENOUS SOLUTION INTRAVENOUS CONTINUOUS
Status: DISCONTINUED | OUTPATIENT
Start: 2022-01-09 | End: 2022-01-09

## 2022-01-09 RX ORDER — IODIXANOL 320 MG/ML
INJECTION, SOLUTION INTRAVASCULAR
Status: DISCONTINUED | OUTPATIENT
Start: 2022-01-09 | End: 2022-01-09 | Stop reason: HOSPADM

## 2022-01-09 RX ORDER — METHYLPREDNISOLONE SOD SUCC 125 MG
VIAL (EA) INJECTION
Status: DISCONTINUED | OUTPATIENT
Start: 2022-01-09 | End: 2022-01-09 | Stop reason: HOSPADM

## 2022-01-09 RX ORDER — FENTANYL CITRATE 50 UG/ML
INJECTION, SOLUTION INTRAMUSCULAR; INTRAVENOUS
Status: DISCONTINUED | OUTPATIENT
Start: 2022-01-09 | End: 2022-01-09 | Stop reason: HOSPADM

## 2022-01-09 RX ORDER — LOSARTAN POTASSIUM 25 MG/1
25 TABLET ORAL DAILY
Status: DISCONTINUED | OUTPATIENT
Start: 2022-01-09 | End: 2022-01-11 | Stop reason: HOSPADM

## 2022-01-09 RX ORDER — PANTOPRAZOLE SODIUM 40 MG/1
40 FOR SUSPENSION ORAL DAILY
Status: DISCONTINUED | OUTPATIENT
Start: 2022-01-09 | End: 2022-01-11 | Stop reason: HOSPADM

## 2022-01-09 RX ORDER — GLUCAGON 1 MG
1 KIT INJECTION
Status: DISCONTINUED | OUTPATIENT
Start: 2022-01-09 | End: 2022-01-11 | Stop reason: HOSPADM

## 2022-01-09 RX ORDER — ONDANSETRON 2 MG/ML
4 INJECTION INTRAMUSCULAR; INTRAVENOUS
Status: COMPLETED | OUTPATIENT
Start: 2022-01-09 | End: 2022-01-09

## 2022-01-09 RX ORDER — INSULIN ASPART 100 [IU]/ML
0-5 INJECTION, SOLUTION INTRAVENOUS; SUBCUTANEOUS
Status: DISCONTINUED | OUTPATIENT
Start: 2022-01-09 | End: 2022-01-11 | Stop reason: HOSPADM

## 2022-01-09 RX ORDER — METOPROLOL SUCCINATE 25 MG/1
25 TABLET, EXTENDED RELEASE ORAL DAILY
Status: DISCONTINUED | OUTPATIENT
Start: 2022-01-10 | End: 2022-01-11 | Stop reason: HOSPADM

## 2022-01-09 RX ORDER — LIDOCAINE HYDROCHLORIDE 10 MG/ML
INJECTION INFILTRATION; PERINEURAL
Status: DISCONTINUED | OUTPATIENT
Start: 2022-01-09 | End: 2022-01-09 | Stop reason: HOSPADM

## 2022-01-09 RX ORDER — METHYLPREDNISOLONE SOD SUCC 125 MG
125 VIAL (EA) INJECTION EVERY 6 HOURS
Status: CANCELLED | OUTPATIENT
Start: 2022-01-09 | End: 2022-01-09

## 2022-01-09 RX ORDER — DIPHENHYDRAMINE HYDROCHLORIDE 50 MG/ML
INJECTION INTRAMUSCULAR; INTRAVENOUS
Status: DISCONTINUED | OUTPATIENT
Start: 2022-01-09 | End: 2022-01-09 | Stop reason: HOSPADM

## 2022-01-09 RX ORDER — ACETAMINOPHEN 325 MG/1
650 TABLET ORAL EVERY 4 HOURS PRN
Status: DISCONTINUED | OUTPATIENT
Start: 2022-01-09 | End: 2022-01-11 | Stop reason: HOSPADM

## 2022-01-09 RX ORDER — INSULIN ASPART 100 [IU]/ML
17 INJECTION, SOLUTION INTRAVENOUS; SUBCUTANEOUS
Status: DISCONTINUED | OUTPATIENT
Start: 2022-01-09 | End: 2022-01-11 | Stop reason: HOSPADM

## 2022-01-09 RX ORDER — TRAMADOL HYDROCHLORIDE 50 MG/1
50 TABLET ORAL EVERY 8 HOURS PRN
Status: DISCONTINUED | OUTPATIENT
Start: 2022-01-09 | End: 2022-01-11 | Stop reason: HOSPADM

## 2022-01-09 RX ORDER — DIPHENHYDRAMINE HYDROCHLORIDE 50 MG/ML
50 INJECTION INTRAMUSCULAR; INTRAVENOUS EVERY 6 HOURS
Status: CANCELLED | OUTPATIENT
Start: 2022-01-09 | End: 2022-01-09

## 2022-01-09 RX ORDER — MIDAZOLAM HYDROCHLORIDE 1 MG/ML
INJECTION INTRAMUSCULAR; INTRAVENOUS
Status: DISCONTINUED | OUTPATIENT
Start: 2022-01-09 | End: 2022-01-09 | Stop reason: HOSPADM

## 2022-01-09 RX ORDER — METOPROLOL TARTRATE 25 MG/1
25 TABLET, FILM COATED ORAL ONCE
Status: COMPLETED | OUTPATIENT
Start: 2022-01-09 | End: 2022-01-09

## 2022-01-09 RX ORDER — DILTIAZEM HYDROCHLORIDE 120 MG/1
120 CAPSULE, COATED, EXTENDED RELEASE ORAL DAILY
Status: DISCONTINUED | OUTPATIENT
Start: 2022-01-09 | End: 2022-01-11 | Stop reason: HOSPADM

## 2022-01-09 RX ORDER — HEPARIN SODIUM 200 [USP'U]/100ML
INJECTION, SOLUTION INTRAVENOUS
Status: DISCONTINUED | OUTPATIENT
Start: 2022-01-09 | End: 2022-01-11 | Stop reason: HOSPADM

## 2022-01-09 RX ORDER — ASPIRIN 325 MG
325 TABLET ORAL
Status: COMPLETED | OUTPATIENT
Start: 2022-01-09 | End: 2022-01-09

## 2022-01-09 RX ORDER — HEPARIN SODIUM 1000 [USP'U]/ML
INJECTION, SOLUTION INTRAVENOUS; SUBCUTANEOUS
Status: DISCONTINUED | OUTPATIENT
Start: 2022-01-09 | End: 2022-01-09 | Stop reason: HOSPADM

## 2022-01-09 RX ORDER — LIDOCAINE HYDROCHLORIDE 20 MG/ML
INJECTION, SOLUTION EPIDURAL; INFILTRATION; INTRACAUDAL; PERINEURAL
Status: DISCONTINUED | OUTPATIENT
Start: 2022-01-09 | End: 2022-01-09 | Stop reason: HOSPADM

## 2022-01-09 RX ORDER — IBUPROFEN 200 MG
16 TABLET ORAL
Status: DISCONTINUED | OUTPATIENT
Start: 2022-01-09 | End: 2022-01-11 | Stop reason: HOSPADM

## 2022-01-09 RX ORDER — SODIUM CHLORIDE 9 MG/ML
INJECTION, SOLUTION INTRAVENOUS
Status: COMPLETED | OUTPATIENT
Start: 2022-01-09 | End: 2022-01-09

## 2022-01-09 RX ORDER — IBUPROFEN 200 MG
24 TABLET ORAL
Status: DISCONTINUED | OUTPATIENT
Start: 2022-01-09 | End: 2022-01-11 | Stop reason: HOSPADM

## 2022-01-09 RX ORDER — SODIUM CHLORIDE 9 MG/ML
INJECTION, SOLUTION INTRAVENOUS CONTINUOUS
Status: CANCELLED | OUTPATIENT
Start: 2022-01-09 | End: 2022-01-09

## 2022-01-09 RX ORDER — FAMOTIDINE 10 MG/ML
INJECTION INTRAVENOUS
Status: DISCONTINUED | OUTPATIENT
Start: 2022-01-09 | End: 2022-01-09 | Stop reason: HOSPADM

## 2022-01-09 RX ORDER — SODIUM CHLORIDE 9 MG/ML
INJECTION, SOLUTION INTRAVENOUS CONTINUOUS
Status: ACTIVE | OUTPATIENT
Start: 2022-01-09 | End: 2022-01-09

## 2022-01-09 RX ORDER — ASPIRIN 81 MG/1
81 TABLET ORAL DAILY
Status: DISCONTINUED | OUTPATIENT
Start: 2022-01-10 | End: 2022-01-11 | Stop reason: HOSPADM

## 2022-01-09 RX ORDER — DIPHENHYDRAMINE HCL 50 MG
50 CAPSULE ORAL ONCE
Status: CANCELLED | OUTPATIENT
Start: 2022-01-09 | End: 2022-01-09

## 2022-01-09 RX ADMIN — METOPROLOL TARTRATE 25 MG: 25 TABLET, FILM COATED ORAL at 05:01

## 2022-01-09 RX ADMIN — INSULIN ASPART 3 UNITS: 100 INJECTION, SOLUTION INTRAVENOUS; SUBCUTANEOUS at 09:01

## 2022-01-09 RX ADMIN — DILTIAZEM HYDROCHLORIDE 120 MG: 120 CAPSULE, COATED, EXTENDED RELEASE ORAL at 10:01

## 2022-01-09 RX ADMIN — TRAMADOL HYDROCHLORIDE 50 MG: 50 TABLET, FILM COATED ORAL at 12:01

## 2022-01-09 RX ADMIN — METHYLPREDNISOLONE SODIUM SUCCINATE 125 MG: 125 INJECTION, POWDER, FOR SOLUTION INTRAMUSCULAR; INTRAVENOUS at 12:01

## 2022-01-09 RX ADMIN — INSULIN ASPART 17 UNITS: 100 INJECTION, SOLUTION INTRAVENOUS; SUBCUTANEOUS at 12:01

## 2022-01-09 RX ADMIN — SODIUM CHLORIDE: 0.9 INJECTION, SOLUTION INTRAVENOUS at 07:01

## 2022-01-09 RX ADMIN — ATORVASTATIN CALCIUM 40 MG: 40 TABLET, FILM COATED ORAL at 10:01

## 2022-01-09 RX ADMIN — ASPIRIN 325 MG ORAL TABLET 325 MG: 325 PILL ORAL at 07:01

## 2022-01-09 RX ADMIN — TICAGRELOR 180 MG: 90 TABLET ORAL at 07:01

## 2022-01-09 RX ADMIN — PANTOPRAZOLE SODIUM 40 MG: 40 GRANULE, DELAYED RELEASE ORAL at 10:01

## 2022-01-09 RX ADMIN — TICAGRELOR 90 MG: 90 TABLET ORAL at 04:01

## 2022-01-09 RX ADMIN — INSULIN DETEMIR 28 UNITS: 100 INJECTION, SOLUTION SUBCUTANEOUS at 09:01

## 2022-01-09 RX ADMIN — SODIUM CHLORIDE: 0.9 INJECTION, SOLUTION INTRAVENOUS at 09:01

## 2022-01-09 RX ADMIN — ONDANSETRON 4 MG: 2 INJECTION INTRAMUSCULAR; INTRAVENOUS at 07:01

## 2022-01-09 RX ADMIN — MORPHINE SULFATE 2 MG: 2 INJECTION, SOLUTION INTRAMUSCULAR; INTRAVENOUS at 07:01

## 2022-01-09 RX ADMIN — LOSARTAN POTASSIUM 25 MG: 25 TABLET, FILM COATED ORAL at 10:01

## 2022-01-09 RX ADMIN — INSULIN ASPART 17 UNITS: 100 INJECTION, SOLUTION INTRAVENOUS; SUBCUTANEOUS at 05:01

## 2022-01-09 RX ADMIN — MELATONIN TAB 3 MG 6 MG: 3 TAB at 09:01

## 2022-01-09 NOTE — Clinical Note
The site was marked. The groin and right radial was prepped. The site was prepped with ChloraPrep. The site was clipped. The patient was draped. The patient was positioned supine. The patient was secured using safety straps.

## 2022-01-09 NOTE — H&P
[facility] - Intensive Care  Cardiology  History and Physical     Patient Name: Kamar Muñoz  MRN: [id number]  Admission Date: 1/9/2022  Code Status: Full Code   Attending Provider: Will Hurst III, *   Primary Care Physician: Basim Guerrero MD  Principal Problem:<principal problem not specified>    Patient information was obtained from patient, spouse/SO, past medical records and ER records.     Subjective:     Chief Complaint:  cp     HPI:  Pt is a 77 yo M w/ PMH of HTN and DM2 w/ hgba1c 12% who presented to the ED w/ c/o typical cp which started about 3 hrs prior to presentation and awoke him from sleep.  He notes radiation of the pain to his back and L shoulder and a/w worse pain w/ a deep breath.  She was seen by his PCP recently for c/o sob and was referred to cardiology however did not have a cardiac w/u.  In the ED, he was noted to have ST elevations in the inferior leads and trop of 18 and code STEMI was activated.  He was taken to the cath lab urgently and found to have a 100% occlusion of the mid RCA which was thought to be the culprit and is s/p successful PCI of the prox and mid RCA w/ a 3.0x38 mm LAUREN with improvement in his cp, although he continues to note some L arm pain.        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 8/2/2012    Type II or unspecified type diabetes mellitus with neurological manifestations, uncontrolled(250.62) 3/8/2013    Urinary tract infection        Past Surgical History:   Procedure Laterality Date    BACK SURGERY      CATARACT EXTRACTION W/  INTRAOCULAR LENS IMPLANT Right     Per Dr Romero note 11/2018    COLONOSCOPY N/A 1/28/2019    Procedure: COLONOSCOPY Suprep;  Surgeon: Anh Johnson MD;  Location: [facility];  Service: Endoscopy;  Laterality: N/A;    EYE SURGERY      HERNIA REPAIR  This note was not shared with the patient due to this is a psychotherapy note   Subjective:     Patient ID: Sonam Bob is a 48 y o  male  [facility] Clinical Progress Notes      Specialized Services Documentation  Therapist must complete separate progress note for each specific clinical activity in which the individual participated during the day  GROUP PSYCHOTHERAPY ([id number]) Group was facilitated virtually in a private office using HIPAA Compliant and Approved Microsoft Teams  Sonam Bob consented to the use of tele-video modality of treatment and was virtually present for group psychotherapy today  The group reviewed the 10 Laws of [place] setting  The group was invited to share challenges faced when setting boundaries and feedback about self-exploration and boundaries  Fabien Alejandro volunteered to read from the list of laws  He participated by offering his experiences with boundary setting  Fabien Alejandro continues to make progress towards goals through verbal participation in group  Continue with Psychotherapy     [place] Plan Objectives: 1 1, 1 2, 1 4   Therapist: Brenda North MA "     renal stones      SHOULDER OPEN ROTATOR CUFF REPAIR         Review of patient's allergies indicates:   Allergen Reactions    Iodine      Other reaction(s): swelling  Other reaction(s): Itching  Other reaction(s): Rash       No current facility-administered medications on file prior to encounter.     Current Outpatient Medications on File Prior to Encounter   Medication Sig    aspirin (ECOTRIN) 81 MG EC tablet Take 81 mg by mouth once daily.    blood sugar diagnostic Strp 1 strip by Misc.(Non-Drug; Combo Route) route 2 (two) times a day.    diclofenac sodium (VOLTAREN) 1 % Gel APPLY 2 GRAMS TO AFFECTED AREA ONCE D    diltiaZEM (CARDIZEM CD) 120 MG Cp24 Take 1 capsule (120 mg total) by mouth once daily.    ergocalciferol (ERGOCALCIFEROL) 50,000 unit Cap     gabapentin (NEURONTIN) 300 MG capsule Take 1 capsule (300 mg total) by mouth 2 (two) times daily.    glucagon, human recombinant, (GLUCAGON EMERGENCY KIT, HUMAN,) 1 mg SolR Inject 1 mg into the muscle as needed.    hydrocortisone 2.5 % cream APPLY TO GROIN RASH BID NO LONGER THAN 7 DAYS    insulin aspart U-100 (NOVOLOG FLEXPEN U-100 INSULIN) 100 unit/mL (3 mL) InPn pen Inject 17 Units into the skin 3 (three) times daily with meals.    insulin glargine (LANTUS) 100 unit/mL injection Inject 28 Units into the skin every evening.    ketoconazole (NIZORAL) 2 % cream APPLY TO GROIN RASH BID NO LONGER THAN 7 DAYS    lancets (ONETOUCH ULTRASOFT LANCETS) Misc 1 lancet by Misc.(Non-Drug; Combo Route) route 2 (two) times a day.    losartan (COZAAR) 25 MG tablet Take 1 tablet (25 mg total) by mouth once daily.    metFORMIN (GLUCOPHAGE) 500 MG tablet Take 1 tablet (500 mg total) by mouth 2 (two) times daily with meals.    MULTIVITAMIN ORAL Take 1 tablet by mouth once daily.     OMEPRAZOLE (PRILOSEC ORAL) Take 1 tablet by mouth daily as needed.     pen needle, diabetic (PEN NEEDLE) 30 gauge x 5/16" Ndle 1 Units by Misc.(Non-Drug; Combo Route) route 3 " (three) times daily.    polycarbophil (FIBERCON) 625 mg tablet Take 1 tablet by mouth once daily.     pravastatin (PRAVACHOL) 40 MG tablet Take 1 tablet (40 mg total) by mouth every evening.    [DISCONTINUED] celecoxib (CELEBREX) 200 MG capsule TAKE 1 CAPSULE(200 MG) BY MOUTH DAILY AS NEEDED FOR PAIN     Family History    None       Tobacco Use    Smoking status: Former Smoker     Packs/day: 1.50     Years: 25.00     Pack years: 37.50     Quit date: 1983     Years since quittin.0    Smokeless tobacco: Never Used   Substance and Sexual Activity    Alcohol use: No    Drug use: No    Sexual activity: Yes     Partners: Female     Review of Systems   Constitutional: Negative for diaphoresis and fever.   HENT: Negative for congestion and hearing loss.    Eyes: Negative for blurred vision and pain.   Cardiovascular: Negative for chest pain, claudication, dyspnea on exertion, leg swelling, near-syncope, palpitations and syncope.   Respiratory: Negative for shortness of breath and sleep disturbances due to breathing.    Hematologic/Lymphatic: Negative for bleeding problem. Does not bruise/bleed easily.   Skin: Negative for color change and poor wound healing.   Musculoskeletal: Positive for back pain.        L arm pain   Gastrointestinal: Negative for abdominal pain and nausea.   Genitourinary: Negative for bladder incontinence and flank pain.   Neurological: Negative for focal weakness and light-headedness.     Objective:     Vital Signs (Most Recent):  Temp: 97.8 °F (36.6 °C) (22 0800)  Pulse: 76 (22 1200)  Resp: 18 (22 1204)  BP: (!) 132/59 (22 1200)  SpO2: 99 % (22 1200) Vital Signs (24h Range):  Temp:  [97.8 °F (36.6 °C)] 97.8 °F (36.6 °C)  Pulse:  [73-97] 76  Resp:  [18-34] 18  SpO2:  [90 %-99 %] 99 %  BP: (120-163)/(59-76) 132/59     Weight: 90.5 kg (199 lb 8.3 oz) (bed scale)  Body mass index is 26.32 kg/m².    SpO2: 99 %  O2 Device (Oxygen Therapy): nasal cannula    No  intake or output data in the 24 hours ending 01/09/22 1347    Lines/Drains/Airways     Peripheral Intravenous Line                 Peripheral IV - Single Lumen 01/09/22 0700 18 G Anterior;Distal;Left Upper Arm <1 day         Peripheral IV - Single Lumen 01/09/22 18 G Anterior;Distal;Right Forearm <1 day                Physical Exam  Constitutional:       Appearance: He is well-developed and well-nourished. He is not diaphoretic.   HENT:      Head: Normocephalic and atraumatic.      Mouth/Throat:      Mouth: Oropharynx is clear and moist.   Eyes:      General: No scleral icterus.     Extraocular Movements: EOM normal.      Pupils: Pupils are equal, round, and reactive to light.   Neck:      Vascular: No JVD.   Cardiovascular:      Rate and Rhythm: Normal rate and regular rhythm.      Pulses: Intact distal pulses.      Heart sounds: S1 normal and S2 normal. No murmur heard.  No friction rub. No gallop.    Pulmonary:      Effort: Pulmonary effort is normal. No respiratory distress.      Breath sounds: Normal breath sounds. No wheezing or rales.   Chest:      Chest wall: No tenderness.   Abdominal:      General: Bowel sounds are normal. There is no distension.      Palpations: Abdomen is soft. There is no mass.      Tenderness: There is no abdominal tenderness. There is no rebound.   Musculoskeletal:         General: No tenderness or edema. Normal range of motion.      Cervical back: Normal range of motion and neck supple.   Skin:     General: Skin is warm and dry.      Coloration: Skin is not pale.   Neurological:      Mental Status: He is alert and oriented to person, place, and time.      Coordination: Coordination normal.      Deep Tendon Reflexes: Reflexes normal.   Psychiatric:         Mood and Affect: Mood and affect normal.         Behavior: Behavior normal.         Judgment: Judgment normal.         Significant Labs:   BMP:   Recent Labs   Lab 01/09/22  0705   *      K 4.3      CO2 23   BUN  17   CREATININE 1.3   CALCIUM 9.1   , CMP   Recent Labs   Lab 01/09/22  0705      K 4.3      CO2 23   *   BUN 17   CREATININE 1.3   CALCIUM 9.1   PROT 7.5   ALBUMIN 3.7   BILITOT 1.4*   ALKPHOS 105   AST 66*   ALT 30   ANIONGAP 12   ESTGFRAFRICA >60   EGFRNONAA 52*   , CBC   Recent Labs   Lab 01/09/22  0705   WBC 11.92   HGB 13.8*   HCT 43.1      , INR   Recent Labs   Lab 01/09/22  0705 01/09/22  0732   INR 1.0 1.0   , Lipid Panel No results for input(s): CHOL, HDL, LDLCALC, TRIG, CHOLHDL in the last 48 hours., Troponin   Recent Labs   Lab 01/09/22 0705   TROPONINI 18.434*    and All pertinent lab results from the last 24 hours have been reviewed.    Significant Imaging: Echocardiogram:   2D echo with color flow doppler:   Results for orders placed or performed during the hospital encounter of 07/06/18   2D echo with color flow doppler   Result Value Ref Range    EF + QEF 65 55 - 65    Mitral Valve Regurgitation MILD     Diastolic Dysfunction No     Est. PA Systolic Pressure 27.8     Pericardial Effusion NONE     Narrative    Date of Procedure: 07/06/2018        TEST DESCRIPTION   Technical Quality: This is a technically adequate study.     Aorta: The aortic root is normal in size, measuring 3.6 cm at sinotubular junction.     Left Atrium: The left atrial volume index is normal, measuring 34.39 cc/m2.     Left Ventricle: The left ventricle is normal in size, with an end-diastolic diameter of 5.3 cm, and an end-systolic diameter of 3.7 cm. LV wall thickness is normal, with the septum measuring 1.0 cm and the posterior wall measuring 0.9 cm across. Relative   wall thickness was normal at 0.34, and the LV mass index was 99.4 g/m2 consistent with normal left ventricular mass. There are no regional wall motion abnormalities. Left ventricular systolic function appears normal. Visually estimated ejection fraction   is 60-65%. The LV Doppler derived stroke volume equals 73.0 ccs.     Diastolic  indices: E wave velocity 0.6 m/s, E/A ratio 0.7,  msec., E/e' ratio(lat) 5. Diastolic function is normal.     Right Atrium: The right atrium is normal in size, measuring 4.9 cm in length in the apical view.     Right Ventricle: The right ventricle is normal in size measuring 2.9 cm at the base in the apical right ventricle-focused view. Global right ventricular systolic function appears normal. The estimated PA systolic pressure is 28 mmHg.     Aortic Valve:  Aortic valve is normal in structure with normal leaflet mobility. The aortic valve is tri-leaflet in structure.     Mitral Valve:  Mitral valve is normal in structure with normal leaflet mobility. The pressure half time is 44 msec. The calculated mitral valve area is 5.0 cm2. There is mild mitral regurgitation.     Tricuspid Valve:  Tricuspid valve is normal in structure with normal leaflet mobility.     Pulmonary Valve:  Pulmonary valve is normal in structure with normal leaflet mobility.     Pericardium: There is no evidence of pericardial effusion.      IVC: IVC is normal in size and collapses > 50% with a sniff, suggesting normal right atrial pressure of 3 mmHg.     Intracavitary: There is no evidence of intracavitary mass or thrombus. There is no evidence of vegetation.         CONCLUSIONS     1 - Normal left ventricular systolic function (EF 60-65%).     2 - Normal left ventricular diastolic function.     3 - No wall motion abnormalities.     4 - Normal right ventricular systolic function .     5 - The estimated PA systolic pressure is 28 mmHg.             This document has been electronically    SIGNED BY: Huey Cifuentes MD On: 07/06/2018 14:28    and Transthoracic echo (TTE) complete (Cupid Only): No results found for this or any previous visit. and EKG: reviewed.      Assessment and Plan:     STEMI involving right coronary artery  100% occlusion of mid RCA s/p successful PCI mid mid to prox RCA w/ 3.0x30 and 3.0x8 mm LAUREN.  Pt noted to have 75%  stenosis of prox LAD as well.    - continue to optimize medical therapy  - DAPT x1 yr w/ ASA and ticagrelor  - cont dilt for now if LVEF normal  - check echo to eval cardiac function   - risk factor and lifestyle modifications  - continue tele to monitor for arrhythmias  - o/p cardiology f/u for possible staged PCI of LAD v. Medical therapy    Coronary artery disease involving native coronary artery of native heart with unstable angina pectoris  See STEMI.      Type 2 diabetes mellitus with hyperglycemia, with long-term current use of insulin  Uncontrolled.  Continue home regimen and SSI.      Primary hypertension  Continue medical therapy.  Risk factor and lifestyle modifications.    Overweight (BMI 25.0-29.9)  Lifestyle modifications (diet, exercise, and weight loss).        VTE Risk Mitigation (From admission, onward)         Ordered     heparin infusion 1,000 units/500 ml in 0.9% NaCl (pressure line flush)  Intra-op continuous PRN         01/09/22 0810                Will Hurst III, MD  Cardiology   [facility] - Intensive Care

## 2022-01-09 NOTE — Clinical Note
The closure device was deployed in the right radial artery. 12 cc's of air were inserted into the closure device.

## 2022-01-09 NOTE — ED PROVIDER NOTES
Encounter Date: 2022       History     Chief Complaint   Patient presents with    Chest Pain     Left sided chest pain that started at 4 am. Now radiates to left shoulder and left neck. States he saw PCP last week for chest pain and was told to follow up with cardiology. Stated his EKG showed and irregular heart rate.      Patient is a 76-year-old male who complains substernal chest pain that has been constant since onset at 4:00 a.m. this morning.  Pain radiates to his left shoulder and to the left side of his neck.  He denies shortness of breath.  No diaphoresis.  No nausea or vomiting.  EKG done upon patient arrival to the ED shows an inferior MI.         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 2012    Type II or unspecified type diabetes mellitus with neurological manifestations, uncontrolled(250.62) 3/8/2013    Urinary tract infection      Past Surgical History:   Procedure Laterality Date    BACK SURGERY      CATARACT EXTRACTION W/  INTRAOCULAR LENS IMPLANT Right     Per Dr Romero note 2018    COLONOSCOPY N/A 2019    Procedure: COLONOSCOPY Suprep;  Surgeon: Anh Johnson MD;  Location: [facility];  Service: Endoscopy;  Laterality: N/A;    EYE SURGERY      HERNIA REPAIR      renal stones      SHOULDER OPEN ROTATOR CUFF REPAIR       Family History   Problem Relation Age of Onset    Prostate cancer Neg Hx     Kidney disease Neg Hx      Social History     Tobacco Use    Smoking status: Former Smoker     Packs/day: 1.50     Years: 25.00     Pack years: 37.50     Quit date: 1983     Years since quittin.0    Smokeless tobacco: Never Used    Substance Use Topics    Alcohol use: No    Drug use: No     Review of Systems   Constitutional: Negative for chills and fever.   Respiratory: Negative for shortness of breath.    Cardiovascular: Positive for chest pain. Negative for leg swelling.   Gastrointestinal: Negative for nausea and vomiting.   Musculoskeletal: Negative for back pain.       Physical Exam     Initial Vitals [01/09/22 0658]   BP Pulse Resp Temp SpO2   (!) 163/71 86 18 -- 96 %      MAP       --         Physical Exam    Nursing note and vitals reviewed.  Constitutional: No distress.   HENT:   Head: Atraumatic.   Eyes: EOM are normal.   Neck: Neck supple.   Cardiovascular: Normal rate, regular rhythm and normal heart sounds.   Pulmonary/Chest: Breath sounds normal.   Abdominal: Abdomen is soft. He exhibits no distension. There is no abdominal tenderness.   Musculoskeletal:         General: Normal range of motion.      Cervical back: Neck supple.      Comments: Trace edema lower extremities bilaterally.     Neurological: He is alert and oriented to person, place, and time.   Skin: Skin is warm and dry.   Psychiatric: Thought content normal.         ED Course   Critical Care    Date/Time: 1/9/2022 7:26 AM  Performed by: Harish Knowles MD  Authorized by: Harish Knowles MD   Direct patient critical care time: 30 minutes  Additional history critical care time: 5 minutes  Ordering / reviewing critical care time: 15 minutes  Documentation critical care time: 15 minutes  Consulting other physicians critical care time: 5 minutes  Total critical care time (exclusive of procedural time) : 70 minutes  Critical care was time spent personally by me on the following activities: examination of patient, ordering and performing treatments and interventions, ordering and review of radiographic studies, re-evaluation of patient's condition, review of old charts, pulse oximetry, ordering and review of laboratory studies, obtaining history from  patient or surrogate, evaluation of patient's response to treatment and discussions with consultants.        Labs Reviewed   CBC W/ AUTO DIFFERENTIAL - Abnormal; Notable for the following components:       Result Value    Hemoglobin 13.8 (*)     Gran # (ANC) 8.5 (*)     Mono # 1.7 (*)     Lymph % 13.4 (*)     All other components within normal limits   COMPREHENSIVE METABOLIC PANEL - Abnormal; Notable for the following components:    Glucose 190 (*)     Total Bilirubin 1.4 (*)     AST 66 (*)     eGFR if non  52 (*)     All other components within normal limits   TROPONIN I - Abnormal; Notable for the following components:    Troponin I 18.434 (*)     All other components within normal limits   POCT GLUCOSE - Abnormal; Notable for the following components:    POCT Glucose 181 (*)     All other components within normal limits   PROTIME-INR   SARS-COV-2 RDRP GENE     EKG Readings: (Independently Interpreted)   Initial Reading: STEMI. Rhythm: Normal Sinus Rhythm. Heart Rate: 85. Ectopy: PVCs. ST Segment Elevation: II, III and AVF.       Imaging Results          X-Ray Chest AP Portable (Final result)  Result time 01/09/22 07:22:11    Final result by Oz Germain MD (01/09/22 07:22:11)                 Impression:      No obvious evidence of an acute pulmonary process.      Electronically signed by: Oz Germain  Date:    01/09/2022  Time:    07:22             Narrative:    EXAMINATION:  XR CHEST AP PORTABLE    CLINICAL HISTORY:  Chest Pain;    TECHNIQUE:  Single frontal view of the chest was performed.    COMPARISON:  November 27, 2018    FINDINGS:  There is hyperinflation of the lungs with chronic interstitial changes of the lung parenchyma.  The level of opacification is unchanged from the previous examination.  There is no evidence of a consolidative process pleural effusion or pulmonary nodule.  The heart is normal in size and contour.  No obvious evidence of free air under the diaphragm.                               X-Rays:   Independently Interpreted Readings:   Chest X-Ray: No acute abnormalities.     Medications   heparin 25,000 units in dextrose 5% (100 units/ml) IV bolus from bag INITIAL BOLUS (has no administration in time range)   heparin 25,000 units in dextrose 5% 250 mL (100 units/mL) infusion HIGH INTENSITY nomogram - [facility] (has no administration in time range)   heparin 25,000 units in dextrose 5% (100 units/ml) IV bolus from bag - ADDITIONAL PRN BOLUS - 60 units/kg (has no administration in time range)   heparin 25,000 units in dextrose 5% (100 units/ml) IV bolus from bag - ADDITIONAL PRN BOLUS - 30 units/kg (has no administration in time range)   aspirin tablet 325 mg (325 mg Oral Given 1/9/22 0703)   0.9%  NaCl infusion ( Intravenous New Bag 1/9/22 0720)   ticagrelor tablet 180 mg (180 mg Oral Given 1/9/22 0718)   morphine injection 2 mg (2 mg Intravenous Given 1/9/22 0726)   ondansetron injection 4 mg (4 mg Intravenous Given 1/9/22 0725)     Medical Decision Making:   ED Management:  Code STEMI called upon patient arrival to room. Dr. Hurst notified.  Recommends starting heparin.  Cath lab team has also been notified.  Patient will be taken upstairs for cardiac angiography ASAP.                      Clinical Impression:   Final diagnoses:  [I21.3] STEMI (ST elevation myocardial infarction)          ED Disposition Condition    Admit               Harish Knowles MD  01/09/22 0901

## 2022-01-09 NOTE — ASSESSMENT & PLAN NOTE
100% occlusion of mid RCA s/p successful PCI mid mid to prox RCA w/ 3.0x30 and 3.0x8 mm LAUREN.  Pt noted to have 75% stenosis of prox LAD as well.    - continue to optimize medical therapy  - DAPT x1 yr w/ ASA and ticagrelor  - cont dilt for now if LVEF normal  - check echo to eval cardiac function   - risk factor and lifestyle modifications  - continue tele to monitor for arrhythmias  - o/p cardiology f/u for possible staged PCI of LAD v. Medical therapy

## 2022-01-09 NOTE — NURSING
Pt having bigeminy, v-tach, ectopy on tele monitor. Pt gets diaphoretic and SOB with rhythm changes. Dr Hurst notified, updated on pt status. Order for metoprolol placed.

## 2022-01-09 NOTE — Clinical Note
An angiography was performed of the right coronary arteries. The angiography was performed via hand injection with 6 mL of contrast.

## 2022-01-09 NOTE — Clinical Note
Dr. Hurst notified that pt. Is allergic to Iodine. Verbal order received to give Benadryl 50 mg IV, Solumedrol 125 mg IV and Pepcid 20 mg IV.

## 2022-01-09 NOTE — SUBJECTIVE & OBJECTIVE
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 8/2/2012    Type II or unspecified type diabetes mellitus with neurological manifestations, uncontrolled(250.62) 3/8/2013    Urinary tract infection        Past Surgical History:   Procedure Laterality Date    BACK SURGERY      CATARACT EXTRACTION W/  INTRAOCULAR LENS IMPLANT Right     Per Dr Romero note 11/2018    COLONOSCOPY N/A 1/28/2019    Procedure: COLONOSCOPY Suprep;  Surgeon: Anh Johnson MD;  Location: [facility];  Service: Endoscopy;  Laterality: N/A;    EYE SURGERY      HERNIA REPAIR      renal stones      SHOULDER OPEN ROTATOR CUFF REPAIR         Review of patient's allergies indicates:   Allergen Reactions    Iodine      Other reaction(s): swelling  Other reaction(s): Itching  Other reaction(s): Rash       No current facility-administered medications on file prior to encounter.     Current Outpatient Medications on File Prior to Encounter   Medication Sig    aspirin (ECOTRIN) 81 MG EC tablet Take 81 mg by mouth once daily.    blood sugar diagnostic Strp 1 strip by Misc.(Non-Drug; Combo Route) route 2 (two) times a day.    diclofenac sodium (VOLTAREN) 1 % Gel APPLY 2 GRAMS TO AFFECTED AREA ONCE D    diltiaZEM (CARDIZEM CD) 120 MG Cp24 Take 1 capsule (120 mg total) by mouth once daily.    ergocalciferol (ERGOCALCIFEROL) 50,000 unit Cap     gabapentin (NEURONTIN) 300 MG capsule Take 1 capsule (300 mg total) by mouth 2 (two) times daily.    glucagon, human recombinant, (GLUCAGON EMERGENCY KIT, HUMAN,) 1 mg SolR Inject 1 mg into the muscle as needed.    hydrocortisone 2.5 % cream APPLY TO GROIN RASH BID NO LONGER THAN 7 DAYS    insulin aspart U-100 (NOVOLOG FLEXPEN U-100 INSULIN) 100 unit/mL (3 mL) InPn pen Inject 17 Units into the skin 3 (three) times daily with meals.     "insulin glargine (LANTUS) 100 unit/mL injection Inject 28 Units into the skin every evening.    ketoconazole (NIZORAL) 2 % cream APPLY TO GROIN RASH BID NO LONGER THAN 7 DAYS    lancets (ONETOUCH ULTRASOFT LANCETS) Misc 1 lancet by Misc.(Non-Drug; Combo Route) route 2 (two) times a day.    losartan (COZAAR) 25 MG tablet Take 1 tablet (25 mg total) by mouth once daily.    metFORMIN (GLUCOPHAGE) 500 MG tablet Take 1 tablet (500 mg total) by mouth 2 (two) times daily with meals.    MULTIVITAMIN ORAL Take 1 tablet by mouth once daily.     OMEPRAZOLE (PRILOSEC ORAL) Take 1 tablet by mouth daily as needed.     pen needle, diabetic (PEN NEEDLE) 30 gauge x 5/16" Ndle 1 Units by Misc.(Non-Drug; Combo Route) route 3 (three) times daily.    polycarbophil (FIBERCON) 625 mg tablet Take 1 tablet by mouth once daily.     pravastatin (PRAVACHOL) 40 MG tablet Take 1 tablet (40 mg total) by mouth every evening.    [DISCONTINUED] celecoxib (CELEBREX) 200 MG capsule TAKE 1 CAPSULE(200 MG) BY MOUTH DAILY AS NEEDED FOR PAIN     Family History    None       Tobacco Use    Smoking status: Former Smoker     Packs/day: 1.50     Years: 25.00     Pack years: 37.50     Quit date: 1983     Years since quittin.0    Smokeless tobacco: Never Used   Substance and Sexual Activity    Alcohol use: No    Drug use: No    Sexual activity: Yes     Partners: Female     Review of Systems   Constitutional: Negative for diaphoresis and fever.   HENT: Negative for congestion and hearing loss.    Eyes: Negative for blurred vision and pain.   Cardiovascular: Negative for chest pain, claudication, dyspnea on exertion, leg swelling, near-syncope, palpitations and syncope.   Respiratory: Negative for shortness of breath and sleep disturbances due to breathing.    Hematologic/Lymphatic: Negative for bleeding problem. Does not bruise/bleed easily.   Skin: Negative for color change and poor wound healing.   Musculoskeletal: Positive for back " pain.        L arm pain   Gastrointestinal: Negative for abdominal pain and nausea.   Genitourinary: Negative for bladder incontinence and flank pain.   Neurological: Negative for focal weakness and light-headedness.     Objective:     Vital Signs (Most Recent):  Temp: 97.8 °F (36.6 °C) (01/09/22 0800)  Pulse: 76 (01/09/22 1200)  Resp: 18 (01/09/22 1204)  BP: (!) 132/59 (01/09/22 1200)  SpO2: 99 % (01/09/22 1200) Vital Signs (24h Range):  Temp:  [97.8 °F (36.6 °C)] 97.8 °F (36.6 °C)  Pulse:  [73-97] 76  Resp:  [18-34] 18  SpO2:  [90 %-99 %] 99 %  BP: (120-163)/(59-76) 132/59     Weight: 90.5 kg (199 lb 8.3 oz) (bed scale)  Body mass index is 26.32 kg/m².    SpO2: 99 %  O2 Device (Oxygen Therapy): nasal cannula    No intake or output data in the 24 hours ending 01/09/22 1347    Lines/Drains/Airways     Peripheral Intravenous Line                 Peripheral IV - Single Lumen 01/09/22 0700 18 G Anterior;Distal;Left Upper Arm <1 day         Peripheral IV - Single Lumen 01/09/22 18 G Anterior;Distal;Right Forearm <1 day                Physical Exam  Constitutional:       Appearance: He is well-developed and well-nourished. He is not diaphoretic.   HENT:      Head: Normocephalic and atraumatic.      Mouth/Throat:      Mouth: Oropharynx is clear and moist.   Eyes:      General: No scleral icterus.     Extraocular Movements: EOM normal.      Pupils: Pupils are equal, round, and reactive to light.   Neck:      Vascular: No JVD.   Cardiovascular:      Rate and Rhythm: Normal rate and regular rhythm.      Pulses: Intact distal pulses.      Heart sounds: S1 normal and S2 normal. No murmur heard.  No friction rub. No gallop.    Pulmonary:      Effort: Pulmonary effort is normal. No respiratory distress.      Breath sounds: Normal breath sounds. No wheezing or rales.   Chest:      Chest wall: No tenderness.   Abdominal:      General: Bowel sounds are normal. There is no distension.      Palpations: Abdomen is soft. There is no  mass.      Tenderness: There is no abdominal tenderness. There is no rebound.   Musculoskeletal:         General: No tenderness or edema. Normal range of motion.      Cervical back: Normal range of motion and neck supple.   Skin:     General: Skin is warm and dry.      Coloration: Skin is not pale.   Neurological:      Mental Status: He is alert and oriented to person, place, and time.      Coordination: Coordination normal.      Deep Tendon Reflexes: Reflexes normal.   Psychiatric:         Mood and Affect: Mood and affect normal.         Behavior: Behavior normal.         Judgment: Judgment normal.         Significant Labs:   BMP:   Recent Labs   Lab 01/09/22  0705   *      K 4.3      CO2 23   BUN 17   CREATININE 1.3   CALCIUM 9.1   , CMP   Recent Labs   Lab 01/09/22  0705      K 4.3      CO2 23   *   BUN 17   CREATININE 1.3   CALCIUM 9.1   PROT 7.5   ALBUMIN 3.7   BILITOT 1.4*   ALKPHOS 105   AST 66*   ALT 30   ANIONGAP 12   ESTGFRAFRICA >60   EGFRNONAA 52*   , CBC   Recent Labs   Lab 01/09/22  0705   WBC 11.92   HGB 13.8*   HCT 43.1      , INR   Recent Labs   Lab 01/09/22  0705 01/09/22  0732   INR 1.0 1.0   , Lipid Panel No results for input(s): CHOL, HDL, LDLCALC, TRIG, CHOLHDL in the last 48 hours., Troponin   Recent Labs   Lab 01/09/22  0705   TROPONINI 18.434*    and All pertinent lab results from the last 24 hours have been reviewed.    Significant Imaging: Echocardiogram:   2D echo with color flow doppler:   Results for orders placed or performed during the hospital encounter of 07/06/18   2D echo with color flow doppler   Result Value Ref Range    EF + QEF 65 55 - 65    Mitral Valve Regurgitation MILD     Diastolic Dysfunction No     Est. PA Systolic Pressure 27.8     Pericardial Effusion NONE     Narrative    Date of Procedure: 07/06/2018        TEST DESCRIPTION   Technical Quality: This is a technically adequate study.     Aorta: The aortic root is normal in  size, measuring 3.6 cm at sinotubular junction.     Left Atrium: The left atrial volume index is normal, measuring 34.39 cc/m2.     Left Ventricle: The left ventricle is normal in size, with an end-diastolic diameter of 5.3 cm, and an end-systolic diameter of 3.7 cm. LV wall thickness is normal, with the septum measuring 1.0 cm and the posterior wall measuring 0.9 cm across. Relative   wall thickness was normal at 0.34, and the LV mass index was 99.4 g/m2 consistent with normal left ventricular mass. There are no regional wall motion abnormalities. Left ventricular systolic function appears normal. Visually estimated ejection fraction   is 60-65%. The LV Doppler derived stroke volume equals 73.0 ccs.     Diastolic indices: E wave velocity 0.6 m/s, E/A ratio 0.7,  msec., E/e' ratio(lat) 5. Diastolic function is normal.     Right Atrium: The right atrium is normal in size, measuring 4.9 cm in length in the apical view.     Right Ventricle: The right ventricle is normal in size measuring 2.9 cm at the base in the apical right ventricle-focused view. Global right ventricular systolic function appears normal. The estimated PA systolic pressure is 28 mmHg.     Aortic Valve:  Aortic valve is normal in structure with normal leaflet mobility. The aortic valve is tri-leaflet in structure.     Mitral Valve:  Mitral valve is normal in structure with normal leaflet mobility. The pressure half time is 44 msec. The calculated mitral valve area is 5.0 cm2. There is mild mitral regurgitation.     Tricuspid Valve:  Tricuspid valve is normal in structure with normal leaflet mobility.     Pulmonary Valve:  Pulmonary valve is normal in structure with normal leaflet mobility.     Pericardium: There is no evidence of pericardial effusion.      IVC: IVC is normal in size and collapses > 50% with a sniff, suggesting normal right atrial pressure of 3 mmHg.     Intracavitary: There is no evidence of intracavitary mass or thrombus. There  is no evidence of vegetation.         CONCLUSIONS     1 - Normal left ventricular systolic function (EF 60-65%).     2 - Normal left ventricular diastolic function.     3 - No wall motion abnormalities.     4 - Normal right ventricular systolic function .     5 - The estimated PA systolic pressure is 28 mmHg.             This document has been electronically    SIGNED BY: Huey Cifuentes MD On: 07/06/2018 14:28    and Transthoracic echo (TTE) complete (Cupid Only): No results found for this or any previous visit. and EKG: reviewed.

## 2022-01-09 NOTE — PROCEDURES
Post Cath Note  Referring Physician: No att. providers found  Procedure: CATHETERIZATION, HEART, LEFT (Left), Stent, Drug Eluting, Single Vessel, Coronary (Right)       Access: Right radial  100% stenosis of mid RCA (culprit), 75% stenosis of mid LAD  LVEDP 15 mmHg    See full report for further details    Intervention:   Successful PCI of mid RCA w/ 3.0x30 and 3.0x8 mm LAUREN with excellent angiographic results.  Closure device: Radial band    Post Cath Exam:   /74   Pulse 75   Temp 97.8 °F (36.6 °C) (Oral)   Resp (!) 27   Wt 92.1 kg (203 lb)   SpO2 96%   BMI 26.78 kg/m²   No unusual pain, hematoma, thrill or bruit at vascular access site.  Distal pulse present without signs of ischemia.    Recommendations:   - Routine post-cath care  - IVF at 175 for 4 hrs  - Cardiac rehab referral, Continue medical management, Risk factor reduction, Plavix for at least 1 year and ASA 81 mg indefinitely, Follow-up with outpatient cardiologist      Signed:  Will Hurst MD  [facility]   1/9/2022 9:13 AM ca

## 2022-01-09 NOTE — Clinical Note
The wire was exchanged and placed in the aorta. How Severe Are Your Spot(S)?: mild What Type Of Note Output Would You Prefer (Optional)?: Standard Output What Is The Reason For Today's Visit?: Full Body Skin Examination What Is The Reason For Today's Visit? (Being Monitored For X): concerning skin lesions on an annual basis

## 2022-01-09 NOTE — Clinical Note
140 ml of contrast were injected throughout the case. 60 mL of contrast was the total wasted during the case. 200 mL was the total amount used during the case.

## 2022-01-09 NOTE — Clinical Note
The catheter was inserted into the ostium   left main. An angiography was performed of the left coronary arteries. Multiple views were taken. The angiography was performed via hand injection with .

## 2022-01-09 NOTE — NURSING
Vasc band removed completely. Gauze and tegaderm bandage placed over site. Radial pulses 2+, equal bilaterally, cap refill <3 sec. No bleeding, no hematoma. WCTM.

## 2022-01-09 NOTE — HPI
Pt is a 75 yo M w/ PMH of HTN and DM2 w/ hgba1c 12% who presented to the ED w/ c/o typical cp which started about 3 hrs prior to presentation and awoke him from sleep.  He notes radiation of the pain to his back and L shoulder and a/w worse pain w/ a deep breath.  She was seen by his PCP recently for c/o sob and cp x1-2 months and was referred to cardiology however did not have a cardiac w/u.  In the ED, he was noted to have ST elevations in the inferior leads and trop of 18 and code STEMI was activated.  He was taken to the cath lab urgently and found to have a 100% occlusion of the mid RCA which was thought to be the culprit and is s/p successful PCI of the prox and mid RCA w/ a 3.0x38 mm LAUREN with improvement in his cp, although he continues to note some L arm pain.

## 2022-01-09 NOTE — NURSING
Pt having episodes of bradycardia lowest seen 45, tele monitor also shows HR jumping up to 130s with V-tach and bigeminy. Pt non-symptomatic 15 minutes ago, however, c/o chest pain 5/10 now. Called Dr Hurst and left VM.

## 2022-01-10 LAB
ANION GAP SERPL CALC-SCNC: 9 MMOL/L (ref 8–16)
AORTIC ROOT ANNULUS: 3.25 CM
AORTIC VALVE CUSP SEPERATION: 1.95 CM
AV INDEX (PROSTH): 0.66
AV MEAN GRADIENT: 3 MMHG
AV PEAK GRADIENT: 4 MMHG
AV VALVE AREA: 2.17 CM2
AV VELOCITY RATIO: 0.69
BASOPHILS # BLD AUTO: 0.01 K/UL (ref 0–0.2)
BASOPHILS NFR BLD: 0.1 % (ref 0–1.9)
BSA FOR ECHO PROCEDURE: 2.16 M2
BUN SERPL-MCNC: 25 MG/DL (ref 8–23)
CALCIUM SERPL-MCNC: 8.3 MG/DL (ref 8.7–10.5)
CHLORIDE SERPL-SCNC: 104 MMOL/L (ref 95–110)
CO2 SERPL-SCNC: 23 MMOL/L (ref 23–29)
CREAT SERPL-MCNC: 1.8 MG/DL (ref 0.5–1.4)
CV ECHO LV RWT: 0.47 CM
DIFFERENTIAL METHOD: ABNORMAL
DOP CALC AO PEAK VEL: 1.04 M/S
DOP CALC AO VTI: 22.63 CM
DOP CALC LVOT AREA: 3.3 CM2
DOP CALC LVOT DIAMETER: 2.04 CM
DOP CALC LVOT PEAK VEL: 0.72 M/S
DOP CALC LVOT STROKE VOLUME: 49.07 CM3
DOP CALC MV VTI: 25.75 CM
DOP CALCLVOT PEAK VEL VTI: 15.02 CM
E WAVE DECELERATION TIME: 212.99 MSEC
E/A RATIO: 0.74
E/E' RATIO: 8.77 M/S
ECHO LV POSTERIOR WALL: 1.19 CM (ref 0.6–1.1)
EJECTION FRACTION: 60 %
EOSINOPHIL # BLD AUTO: 0 K/UL (ref 0–0.5)
EOSINOPHIL NFR BLD: 0 % (ref 0–8)
ERYTHROCYTE [DISTWIDTH] IN BLOOD BY AUTOMATED COUNT: 12.6 % (ref 11.5–14.5)
EST. GFR  (AFRICAN AMERICAN): 41 ML/MIN/1.73 M^2
EST. GFR  (NON AFRICAN AMERICAN): 35 ML/MIN/1.73 M^2
FRACTIONAL SHORTENING: 20 % (ref 28–44)
GLUCOSE SERPL-MCNC: 277 MG/DL (ref 70–110)
HCT VFR BLD AUTO: 38.4 % (ref 40–54)
HGB BLD-MCNC: 12.5 G/DL (ref 14–18)
IMM GRANULOCYTES # BLD AUTO: 0.05 K/UL (ref 0–0.04)
IMM GRANULOCYTES NFR BLD AUTO: 0.4 % (ref 0–0.5)
INTERVENTRICULAR SEPTUM: 1.24 CM (ref 0.6–1.1)
IVRT: 85.63 MSEC
LA MAJOR: 5.25 CM
LA MINOR: 5.53 CM
LA WIDTH: 3.94 CM
LEFT ATRIUM SIZE: 3.97 CM
LEFT ATRIUM VOLUME INDEX MOD: 16 ML/M2
LEFT ATRIUM VOLUME INDEX: 33.3 ML/M2
LEFT ATRIUM VOLUME MOD: 34.37 CM3
LEFT ATRIUM VOLUME: 71.61 CM3
LEFT INTERNAL DIMENSION IN SYSTOLE: 4.06 CM (ref 2.1–4)
LEFT VENTRICLE DIASTOLIC VOLUME INDEX: 56.18 ML/M2
LEFT VENTRICLE DIASTOLIC VOLUME: 120.79 ML
LEFT VENTRICLE MASS INDEX: 112 G/M2
LEFT VENTRICLE SYSTOLIC VOLUME INDEX: 33.7 ML/M2
LEFT VENTRICLE SYSTOLIC VOLUME: 72.46 ML
LEFT VENTRICULAR INTERNAL DIMENSION IN DIASTOLE: 5.05 CM (ref 3.5–6)
LEFT VENTRICULAR MASS: 241.65 G
LV LATERAL E/E' RATIO: 8.14 M/S
LV SEPTAL E/E' RATIO: 9.5 M/S
LYMPHOCYTES # BLD AUTO: 0.8 K/UL (ref 1–4.8)
LYMPHOCYTES NFR BLD: 5.7 % (ref 18–48)
MCH RBC QN AUTO: 28.5 PG (ref 27–31)
MCHC RBC AUTO-ENTMCNC: 32.6 G/DL (ref 32–36)
MCV RBC AUTO: 88 FL (ref 82–98)
MONOCYTES # BLD AUTO: 0.8 K/UL (ref 0.3–1)
MONOCYTES NFR BLD: 5.9 % (ref 4–15)
MV A" WAVE DURATION": 13.7 MSEC
MV MEAN GRADIENT: 0 MMHG
MV PEAK A VEL: 0.77 M/S
MV PEAK E VEL: 0.57 M/S
MV PEAK GRADIENT: 3 MMHG
MV STENOSIS PRESSURE HALF TIME: 61.77 MS
MV VALVE AREA BY CONTINUITY EQUATION: 1.91 CM2
MV VALVE AREA P 1/2 METHOD: 3.56 CM2
NEUTROPHILS # BLD AUTO: 11.6 K/UL (ref 1.8–7.7)
NEUTROPHILS NFR BLD: 87.9 % (ref 38–73)
NRBC BLD-RTO: 0 /100 WBC
PISA MRMAX VEL: 0.04 M/S
PISA TR MAX VEL: 1.68 M/S
PLATELET # BLD AUTO: 158 K/UL (ref 150–450)
PMV BLD AUTO: 10.7 FL (ref 9.2–12.9)
POCT GLUCOSE: 139 MG/DL (ref 70–110)
POCT GLUCOSE: 161 MG/DL (ref 70–110)
POCT GLUCOSE: 227 MG/DL (ref 70–110)
POCT GLUCOSE: 259 MG/DL (ref 70–110)
POCT GLUCOSE: 334 MG/DL (ref 70–110)
POTASSIUM SERPL-SCNC: 5.2 MMOL/L (ref 3.5–5.1)
PULM VEIN S/D RATIO: 1.5
PV PEAK D VEL: 0.3 M/S
PV PEAK S VEL: 0.45 M/S
RA MAJOR: 5.83 CM
RA PRESSURE: 15 MMHG
RA WIDTH: 5.07 CM
RBC # BLD AUTO: 4.38 M/UL (ref 4.6–6.2)
RIGHT VENTRICULAR END-DIASTOLIC DIMENSION: 2.98 CM
SODIUM SERPL-SCNC: 136 MMOL/L (ref 136–145)
TDI LATERAL: 0.07 M/S
TDI SEPTAL: 0.06 M/S
TDI: 0.07 M/S
TR MAX PG: 11 MMHG
TV REST PULMONARY ARTERY PRESSURE: 26 MMHG
WBC # BLD AUTO: 13.16 K/UL (ref 3.9–12.7)

## 2022-01-10 PROCEDURE — 20000000 HC ICU ROOM: Mod: HCNC

## 2022-01-10 PROCEDURE — 99233 SBSQ HOSP IP/OBS HIGH 50: CPT | Mod: 25,HCNC,, | Performed by: INTERNAL MEDICINE

## 2022-01-10 PROCEDURE — 99356 PR PROLONGED SERV,INPATIENT,1ST HR: ICD-10-PCS | Mod: HCNC,,, | Performed by: INTERNAL MEDICINE

## 2022-01-10 PROCEDURE — 94761 N-INVAS EAR/PLS OXIMETRY MLT: CPT | Mod: HCNC

## 2022-01-10 PROCEDURE — C9399 UNCLASSIFIED DRUGS OR BIOLOG: HCPCS | Mod: HCNC | Performed by: INTERNAL MEDICINE

## 2022-01-10 PROCEDURE — 27000221 HC OXYGEN, UP TO 24 HOURS: Mod: HCNC

## 2022-01-10 PROCEDURE — 87206 SMEAR FLUORESCENT/ACID STAI: CPT | Mod: HCNC | Performed by: STUDENT IN AN ORGANIZED HEALTH CARE EDUCATION/TRAINING PROGRAM

## 2022-01-10 PROCEDURE — 63600175 PHARM REV CODE 636 W HCPCS: Mod: HCNC | Performed by: SURGERY

## 2022-01-10 PROCEDURE — 99233 PR SUBSEQUENT HOSPITAL CARE,LEVL III: ICD-10-PCS | Mod: 25,HCNC,, | Performed by: INTERNAL MEDICINE

## 2022-01-10 PROCEDURE — 25000003 PHARM REV CODE 250: Mod: HCNC | Performed by: INTERNAL MEDICINE

## 2022-01-10 PROCEDURE — 99356 PR PROLONGED SERV,INPATIENT,1ST HR: CPT | Mod: HCNC,,, | Performed by: INTERNAL MEDICINE

## 2022-01-10 PROCEDURE — 87116 MYCOBACTERIA CULTURE: CPT | Mod: HCNC | Performed by: STUDENT IN AN ORGANIZED HEALTH CARE EDUCATION/TRAINING PROGRAM

## 2022-01-10 PROCEDURE — 87015 SPECIMEN INFECT AGNT CONCNTJ: CPT | Mod: HCNC | Performed by: STUDENT IN AN ORGANIZED HEALTH CARE EDUCATION/TRAINING PROGRAM

## 2022-01-10 PROCEDURE — 99900035 HC TECH TIME PER 15 MIN (STAT): Mod: HCNC

## 2022-01-10 PROCEDURE — 94640 AIRWAY INHALATION TREATMENT: CPT | Mod: HCNC

## 2022-01-10 RX ORDER — ALUMINUM HYDROXIDE, MAGNESIUM HYDROXIDE, AND SIMETHICONE 2400; 240; 2400 MG/30ML; MG/30ML; MG/30ML
30 SUSPENSION ORAL EVERY 6 HOURS PRN
Status: CANCELLED | OUTPATIENT
Start: 2022-01-10

## 2022-01-10 RX ORDER — MUPIROCIN 20 MG/G
OINTMENT TOPICAL 2 TIMES DAILY
Status: DISCONTINUED | OUTPATIENT
Start: 2022-01-10 | End: 2022-01-11 | Stop reason: HOSPADM

## 2022-01-10 RX ORDER — SODIUM CHLORIDE FOR INHALATION 3 %
4 VIAL, NEBULIZER (ML) INHALATION
Status: DISCONTINUED | OUTPATIENT
Start: 2022-01-10 | End: 2022-01-11 | Stop reason: HOSPADM

## 2022-01-10 RX ORDER — ALBUTEROL SULFATE 2.5 MG/.5ML
2.5 SOLUTION RESPIRATORY (INHALATION)
Status: DISCONTINUED | OUTPATIENT
Start: 2022-01-10 | End: 2022-01-11 | Stop reason: HOSPADM

## 2022-01-10 RX ORDER — POLYETHYLENE GLYCOL 3350 17 G/17G
17 POWDER, FOR SOLUTION ORAL 2 TIMES DAILY PRN
Status: CANCELLED | OUTPATIENT
Start: 2022-01-10

## 2022-01-10 RX ORDER — SIMETHICONE 125 MG
125 TABLET,CHEWABLE ORAL EVERY 6 HOURS PRN
Status: CANCELLED | OUTPATIENT
Start: 2022-01-10

## 2022-01-10 RX ORDER — ONDANSETRON 2 MG/ML
4 INJECTION INTRAMUSCULAR; INTRAVENOUS EVERY 6 HOURS PRN
Status: DISCONTINUED | OUTPATIENT
Start: 2022-01-10 | End: 2022-01-11 | Stop reason: HOSPADM

## 2022-01-10 RX ADMIN — ATORVASTATIN CALCIUM 40 MG: 40 TABLET, FILM COATED ORAL at 09:01

## 2022-01-10 RX ADMIN — LOSARTAN POTASSIUM 25 MG: 25 TABLET, FILM COATED ORAL at 09:01

## 2022-01-10 RX ADMIN — ONDANSETRON 4 MG: 2 INJECTION INTRAMUSCULAR; INTRAVENOUS at 04:01

## 2022-01-10 RX ADMIN — PANTOPRAZOLE SODIUM 40 MG: 40 GRANULE, DELAYED RELEASE ORAL at 09:01

## 2022-01-10 RX ADMIN — INSULIN ASPART 17 UNITS: 100 INJECTION, SOLUTION INTRAVENOUS; SUBCUTANEOUS at 12:01

## 2022-01-10 RX ADMIN — TICAGRELOR 90 MG: 90 TABLET ORAL at 09:01

## 2022-01-10 RX ADMIN — TICAGRELOR 90 MG: 90 TABLET ORAL at 08:01

## 2022-01-10 RX ADMIN — MUPIROCIN: 20 OINTMENT TOPICAL at 08:01

## 2022-01-10 RX ADMIN — DILTIAZEM HYDROCHLORIDE 120 MG: 120 CAPSULE, COATED, EXTENDED RELEASE ORAL at 09:01

## 2022-01-10 RX ADMIN — INSULIN ASPART 17 UNITS: 100 INJECTION, SOLUTION INTRAVENOUS; SUBCUTANEOUS at 06:01

## 2022-01-10 RX ADMIN — METOPROLOL SUCCINATE 25 MG: 25 TABLET, EXTENDED RELEASE ORAL at 09:01

## 2022-01-10 RX ADMIN — ASPIRIN 81 MG: 81 TABLET, COATED ORAL at 09:01

## 2022-01-10 RX ADMIN — INSULIN DETEMIR 28 UNITS: 100 INJECTION, SOLUTION SUBCUTANEOUS at 08:01

## 2022-01-10 RX ADMIN — INSULIN ASPART 17 UNITS: 100 INJECTION, SOLUTION INTRAVENOUS; SUBCUTANEOUS at 05:01

## 2022-01-10 RX ADMIN — TRAMADOL HYDROCHLORIDE 50 MG: 50 TABLET, FILM COATED ORAL at 04:01

## 2022-01-10 RX ADMIN — TRAMADOL HYDROCHLORIDE 50 MG: 50 TABLET, FILM COATED ORAL at 08:01

## 2022-01-10 RX ADMIN — MUPIROCIN: 20 OINTMENT TOPICAL at 09:01

## 2022-01-10 NOTE — EICU
eICU Physician Virtual/Remote Brief Evaluation Note      Message from RN  Patient requesting sleep medication a  Chart reviewed  Melatonin 6 mg q.h.s. ordered      JOHN Walker MD  [facility] Attending  [phone]    This report has been created through the use of [employer] dictation software. Typographical and content errors may occur with this process. While efforts are made to detect and correct such errors, in some cases errors will persist. For this reason, wording in this document should be considered in the proper context and not strictly verbatim

## 2022-01-10 NOTE — PLAN OF CARE
Pt remains in ICU with continuous monitoring. HR bradycardic to tachycardic with delilah navas, MD aware. Pt symptomatic at times, other times not symptomatic. BP stable, afebrile, SpO2 in high 90s on 3L NC. C/o pain to chest which radiated to shoulder, MD aware, PRN tramadol helped a little. UOP 375ml. Appetite fair. BG in 300s  with SS insulin. POC discussed with pt and spouse at bedside. Safety maintained. Handed off report to Imelda who assumed care of pt.

## 2022-01-10 NOTE — PROGRESS NOTES
[facility] - Intensive Care  Cardiology  Progress Note    Patient Name: Kamar Muñoz  MRN: [id number]  Admission Date: 1/9/2022  Code Status: Full Code   Attending Provider: Will Hurst III, *   Primary Care Physician: Basim Guerrero MD  Principal Problem:STEMI involving right coronary artery    Patient information was obtained from patient, spouse/SO, past medical records and ER records.     Subjective:     Chief Complaint:  cp     HPI:  Pt is a 77 yo M w/ PMH of HTN and DM2 w/ hgba1c 12% who presented to the ED w/ c/o typical cp which started about 3 hrs prior to presentation and awoke him from sleep.  He notes radiation of the pain to his back and L shoulder and a/w worse pain w/ a deep breath.  She was seen by his PCP recently for c/o sob and cp x1-2 months and was referred to cardiology however did not have a cardiac w/u.  In the ED, he was noted to have ST elevations in the inferior leads and trop of 18 and code STEMI was activated.  He was taken to the cath lab urgently and found to have a 100% occlusion of the mid RCA which was thought to be the culprit and is s/p successful PCI of the prox and mid RCA w/ a 3.0x38 mm LAUREN with improvement in his cp, although he continues to note some L arm pain.      1/10/2022  Overnight had some dizziness. Telemetry reviewed and PVC's noted, no arrhythmias. BP/HR stable. No CP. Tolerating meds well.    Review of Systems   Constitutional: Negative for diaphoresis and fever.   HENT: Negative for congestion and hearing loss.    Eyes: Negative for blurred vision and pain.   Cardiovascular: Negative for chest pain, claudication, dyspnea on exertion, leg swelling, near-syncope, palpitations and syncope.   Respiratory: Negative for shortness of breath and sleep disturbances due to breathing.    Hematologic/Lymphatic: Negative for bleeding problem. Does not bruise/bleed easily.   Skin: Negative for color change and poor wound healing.   Musculoskeletal: Positive for  back pain.        L arm pain   Gastrointestinal: Negative for abdominal pain and nausea.   Genitourinary: Negative for bladder incontinence and flank pain.   Neurological: Negative for focal weakness and light-headedness.      Objective:     Vitals:    01/10/22 0615 01/10/22 0745 01/10/22 0851 01/10/22 0922   BP:    135/66   Pulse: 69  72    Resp: 15  16    Temp:  98.3 °F (36.8 °C)     TempSrc:  Oral     SpO2: 100%  100%    Weight:       Height:         Intake/Output - Last 3 Shifts       01/08 0700  01/09 0659 01/09 0700  01/10 0659 01/10 0700 01/11 0659    P.O.  1000     Total Intake(mL/kg)  1000 (11)     Urine (mL/kg/hr)  675 (0.3)     Stool  0     Total Output  675     Net  +325            Stool Occurrence  0 x         Physical Exam  Constitutional:       Appearance: He is well-developed and well-nourished. He is not diaphoretic.   HENT:      Head: Normocephalic and atraumatic.      Mouth/Throat:      Mouth: Oropharynx is clear and moist.   Eyes:      General: No scleral icterus.     Extraocular Movements: EOM normal.      Pupils: Pupils are equal, round, and reactive to light.   Neck:      Vascular: No JVD.   Cardiovascular:      Rate and Rhythm: Normal rate and regular rhythm.      Pulses: Intact distal pulses.      Heart sounds: S1 normal and S2 normal. No murmur heard.  No friction rub. No gallop.    Pulmonary:      Effort: Pulmonary effort is normal. No respiratory distress.      Breath sounds: Normal breath sounds. No wheezing or rales.   Chest:      Chest wall: No tenderness.   Abdominal:      General: Bowel sounds are normal. There is no distension.      Palpations: Abdomen is soft. There is no mass.      Tenderness: There is no abdominal tenderness. There is no rebound.   Musculoskeletal:         General: No tenderness or edema. Normal range of motion.      Cervical back: Normal range of motion and neck supple.   Skin:     General: Skin is warm and dry.      Coloration: Skin is not pale.    Neurological:      Mental Status: He is alert and oriented to person, place, and time.      Coordination: Coordination normal.      Deep Tendon Reflexes: Reflexes normal.   Psychiatric:         Mood and Affect: Mood and affect normal.         Behavior: Behavior normal.         Judgment: Judgment normal.     Recent Labs     01/09/22  2339   HGB 12.5*   HCT 38.4*      K 5.2*   CREATININE 1.8*        Assessment and Plan:     STEMI involving right coronary artery  100% occlusion of mid RCA s/p successful PCI mid mid to prox RCA w/ 3.0x30 and 3.0x8 mm LAUREN.  Pt noted to have 75% stenosis of prox LAD as well.    - continue to optimize medical therapy  - DAPT x1 yr w/ ASA and ticagrelor  - cont dilt for now if LVEF normal  - 2DE pending  - risk factor and lifestyle modifications  - continue tele to monitor for arrhythmias  -Outpt PET stress for possible LAD intervention    Coronary artery disease involving native coronary artery of native heart with unstable angina pectoris  See STEMI.      Type 2 diabetes mellitus with hyperglycemia, with long-term current use of insulin  -Uncontrolled.  Continue home regimen and SSI.    -Needs better glycemic     Primary hypertension  Continue medical therapy.  Risk factor and lifestyle modifications.    Overweight (BMI 25.0-29.9)  Lifestyle modifications (diet, exercise, and weight loss).      > 45 mins involved in pt care, examination, plan. Plan and care reviewed with patient and all questions answered    VTE Risk Mitigation (From admission, onward)         Ordered     heparin infusion 1,000 units/500 ml in 0.9% NaCl (pressure line flush)  Intra-op continuous PRN         01/09/22 0810                Huey Cifuentes MD  Cardiology   [facility] - Intensive Care

## 2022-01-10 NOTE — PLAN OF CARE
Pt AAOX3 and follows commands. SR with ectopy on monitor. No runs of Vtach during shift. No c/o chest pain. One episode of nausea and headache resolved with prn. BP stable during shift with Sats >95 on NC. Radial cath site CDI with no hematoma and palpable pulses. No reports of numbness or tingling. UOP adequate per urinal. BG elevated treated with insulin. Frequent checks for safety done during shift. Will report off to AM SOY.

## 2022-01-10 NOTE — PLAN OF CARE
The sw met with the pt and his wife Aimee Muñoz [phone] who was at bedside during the assessment. The pt's independent with his adl's and doesn't use dme. The pt recently retired and leads a very active lifestyle. The pt still drives but his wife will transport him home at d/c. The sw completed the white board in the pt's room and gave him a d/c brochure. The sw encouraged him to call if he has any questions or concerns. The sw will continue to follow the pt throughout his transitions of care and will assist with any d/c needs.        01/10/22 0229   Discharge Assessment   Assessment Type Discharge Planning Assessment   Confirmed/corrected address, phone number and insurance Yes   Confirmed Demographics Correct on Facesheet   Source of Information patient   When was your last doctors appointment?   (last week)   Communicated ALLIE with patient/caregiver Date not available/Unable to determine   Reason For Admission primary htn   Lives With spouse   Do you expect to return to your current living situation? Yes   Do you have help at home or someone to help you manage your care at home? Yes   Who are your caregiver(s) and their phone number(s)? Aimee Muñoz(wife)[phone]   Prior to hospitilization cognitive status: Alert/Oriented   Current cognitive status: Alert/Oriented   Walking or Climbing Stairs Difficulty none   Dressing/Bathing Difficulty none   Home Accessibility wheelchair accessible   Home Layout Able to live on 1st floor   Equipment Currently Used at Home none   Readmission within 30 days? No   Patient currently being followed by outpatient case management? No   Do you currently have service(s) that help you manage your care at home? No   Do you take prescription medications? Yes   Do you have prescription coverage? Yes   Coverage [employer]   Do you have any problems affording any of your prescribed medications? No   Is the patient taking medications as prescribed? yes   Who is going to help you  get home at discharge? Aimee Muñoz(wife)[phone]   How do you get to doctors appointments? car, drives self   Are you on dialysis? No   Do you take coumadin? No   Discharge Plan A Home with family   Discharge Plan B Home Health   DME Needed Upon Discharge  none   Discharge Plan discussed with: Spouse/sig other;Patient   Name(s) and Number(s) Aimee Muñoz(wife)[phone]   Discharge Barriers Identified None   Relationship/Environment   Name(s) of Who Lives With Patient Aimee Muñoz(wife)[phone]

## 2022-01-10 NOTE — EICU
Intervention Initiated From:  Bedside    Ulises Communicated with Bedside Nurse regarding:  Medication  Request for order for med for sleep      Doctor Notified:  Yes  Comments: Dr Jason Walker

## 2022-01-10 NOTE — EICU
eICU Physician Virtual/Remote Brief Evaluation Note      Message from RN  Patient with nausea and dry heaves  Chart reviewed  EKG with normal QTC  Zofran 4 mg IV q.6h p.r.n. nausea/vomiting      JOHN Walker MD  [facility] Attending  [phone]    This report has been created through the use of [employer] dictation software. Typographical and content errors may occur with this process. While efforts are made to detect and correct such errors, in some cases errors will persist. For this reason, wording in this document should be considered in the proper context and not strictly verbatim

## 2022-01-11 ENCOUNTER — TELEPHONE (OUTPATIENT)
Dept: CARDIOLOGY | Facility: CLINIC | Age: 77
End: 2022-01-11
Payer: MEDICARE

## 2022-01-11 VITALS
OXYGEN SATURATION: 97 % | BODY MASS INDEX: 26.37 KG/M2 | SYSTOLIC BLOOD PRESSURE: 143 MMHG | RESPIRATION RATE: 35 BRPM | WEIGHT: 199 LBS | DIASTOLIC BLOOD PRESSURE: 77 MMHG | TEMPERATURE: 98 F | HEIGHT: 73 IN | HEART RATE: 83 BPM

## 2022-01-11 DIAGNOSIS — I21.11 STEMI INVOLVING RIGHT CORONARY ARTERY: ICD-10-CM

## 2022-01-11 DIAGNOSIS — I25.110 CORONARY ARTERY DISEASE INVOLVING NATIVE CORONARY ARTERY OF NATIVE HEART WITH UNSTABLE ANGINA PECTORIS: Primary | ICD-10-CM

## 2022-01-11 LAB
POC ACTIVATED CLOTTING TIME K: 237 SEC (ref 74–137)
POC ACTIVATED CLOTTING TIME K: 249 SEC (ref 74–137)
POCT GLUCOSE: 65 MG/DL (ref 70–110)
POCT GLUCOSE: 95 MG/DL (ref 70–110)
SAMPLE: ABNORMAL
SAMPLE: ABNORMAL

## 2022-01-11 PROCEDURE — 1111F PR DISCHARGE MEDS RECONCILED W/ CURRENT OUTPATIENT MED LIST: ICD-10-PCS | Mod: CPTII,,, | Performed by: NURSE PRACTITIONER

## 2022-01-11 PROCEDURE — 87118 MYCOBACTERIC IDENTIFICATION: CPT | Performed by: STUDENT IN AN ORGANIZED HEALTH CARE EDUCATION/TRAINING PROGRAM

## 2022-01-11 PROCEDURE — 94761 N-INVAS EAR/PLS OXIMETRY MLT: CPT | Mod: HCNC

## 2022-01-11 PROCEDURE — 1111F DSCHRG MED/CURRENT MED MERGE: CPT | Mod: CPTII,,, | Performed by: NURSE PRACTITIONER

## 2022-01-11 PROCEDURE — 25000242 PHARM REV CODE 250 ALT 637 W/ HCPCS: Mod: HCNC | Performed by: STUDENT IN AN ORGANIZED HEALTH CARE EDUCATION/TRAINING PROGRAM

## 2022-01-11 PROCEDURE — 99239 PR HOSPITAL DISCHARGE DAY,>30 MIN: ICD-10-PCS | Mod: ,,, | Performed by: NURSE PRACTITIONER

## 2022-01-11 PROCEDURE — 25000003 PHARM REV CODE 250: Mod: HCNC | Performed by: INTERNAL MEDICINE

## 2022-01-11 PROCEDURE — 87149 DNA/RNA DIRECT PROBE: CPT | Performed by: STUDENT IN AN ORGANIZED HEALTH CARE EDUCATION/TRAINING PROGRAM

## 2022-01-11 PROCEDURE — 87116 MYCOBACTERIA CULTURE: CPT | Mod: HCNC | Performed by: STUDENT IN AN ORGANIZED HEALTH CARE EDUCATION/TRAINING PROGRAM

## 2022-01-11 PROCEDURE — 87015 SPECIMEN INFECT AGNT CONCNTJ: CPT | Performed by: STUDENT IN AN ORGANIZED HEALTH CARE EDUCATION/TRAINING PROGRAM

## 2022-01-11 PROCEDURE — 87206 SMEAR FLUORESCENT/ACID STAI: CPT | Performed by: STUDENT IN AN ORGANIZED HEALTH CARE EDUCATION/TRAINING PROGRAM

## 2022-01-11 PROCEDURE — 99239 HOSP IP/OBS DSCHRG MGMT >30: CPT | Mod: ,,, | Performed by: NURSE PRACTITIONER

## 2022-01-11 PROCEDURE — 87186 SC STD MICRODIL/AGAR DIL: CPT | Performed by: STUDENT IN AN ORGANIZED HEALTH CARE EDUCATION/TRAINING PROGRAM

## 2022-01-11 PROCEDURE — 94640 AIRWAY INHALATION TREATMENT: CPT | Mod: HCNC

## 2022-01-11 RX ORDER — METOPROLOL SUCCINATE 25 MG/1
25 TABLET, EXTENDED RELEASE ORAL DAILY
Qty: 30 TABLET | Refills: 11 | Status: ON HOLD | OUTPATIENT
Start: 2022-01-11 | End: 2022-02-02 | Stop reason: HOSPADM

## 2022-01-11 RX ORDER — NITROGLYCERIN 0.4 MG/1
0.4 TABLET SUBLINGUAL EVERY 5 MIN PRN
Qty: 25 TABLET | Refills: 11 | Status: SHIPPED | OUTPATIENT
Start: 2022-01-11 | End: 2024-02-29

## 2022-01-11 RX ORDER — ATORVASTATIN CALCIUM 40 MG/1
40 TABLET, FILM COATED ORAL DAILY
Qty: 90 TABLET | Refills: 3 | Status: SHIPPED | OUTPATIENT
Start: 2022-01-11 | End: 2023-03-24

## 2022-01-11 RX ADMIN — ASPIRIN 81 MG: 81 TABLET, COATED ORAL at 09:01

## 2022-01-11 RX ADMIN — LOSARTAN POTASSIUM 25 MG: 25 TABLET, FILM COATED ORAL at 09:01

## 2022-01-11 RX ADMIN — Medication 16 G: at 06:01

## 2022-01-11 RX ADMIN — ATORVASTATIN CALCIUM 40 MG: 40 TABLET, FILM COATED ORAL at 09:01

## 2022-01-11 RX ADMIN — SODIUM CHLORIDE 30 MG/ML INHALATION SOLUTION 4 ML: 30 SOLUTION INHALANT at 08:01

## 2022-01-11 RX ADMIN — TICAGRELOR 90 MG: 90 TABLET ORAL at 09:01

## 2022-01-11 RX ADMIN — DILTIAZEM HYDROCHLORIDE 120 MG: 120 CAPSULE, COATED, EXTENDED RELEASE ORAL at 09:01

## 2022-01-11 RX ADMIN — METOPROLOL SUCCINATE 25 MG: 25 TABLET, EXTENDED RELEASE ORAL at 09:01

## 2022-01-11 RX ADMIN — PANTOPRAZOLE SODIUM 40 MG: 40 GRANULE, DELAYED RELEASE ORAL at 09:01

## 2022-01-11 NOTE — TELEPHONE ENCOUNTER
Detailed VM left on cell # in regards to arranging appts for PET stress test and f/u with Dr Seay.    Appts arranged   PET 1/31/2022  F/u 2/2/2022    Office # provided for questions or concerns.

## 2022-01-11 NOTE — PLAN OF CARE
Pt remains in ICU with continuous monitoring with orders to step down. AAOx4, VSS. BG controlled with SS insulin. No complaints of chest pain. Sat in chair for about 2 hours, pt fatigued with transferring, no dizziness, no pain, BP stable. UOP 800ml. No BM. Appetite good. Cardiology discussed POC with pt at bedside. Safety maintained. Handed off report to Ruba who assumed care of the pt.

## 2022-01-11 NOTE — TELEPHONE ENCOUNTER
----- Message from HIRAL Fourneir ANP sent at 1/11/2022  8:56 AM CST -----  Patient needs cardiac PET stress. Any chance we can get it before follow up with Dr. Osbornf. Please call patient with appt date and time    BH

## 2022-01-11 NOTE — PLAN OF CARE
01/11/22 0911   Final Note   Assessment Type Final Discharge Note   Anticipated Discharge Disposition Home   What phone number can be called within the next 1-3 days to see how you are doing after discharge? [phone]   Hospital Resources/Appts/Education Provided Appointments scheduled and added to AVS   Post-Acute Status   Discharge Delays None known at this time

## 2022-01-11 NOTE — PLAN OF CARE
Pt remained in ICU overnight, awaiting bed availability to transfer. No significant changes overnight. Pt is alert and oriented x4. NSR on monitor. O2 sats % on room air. UOP ~ 750 ccs overnight. BG 65 this AM. Glucose tablets and orange juice given. Last BG 95. Spoke with spouse, Aimee. Updated pt and spouse on plan of care and transfer orders to telemetry floor. Report given to oncoming RNKalee.

## 2022-01-11 NOTE — PLAN OF CARE
Patient on room air with documented SpO2 and in no apparent distress. Will continue to monitor. Sputum to be collected at 0200.

## 2022-01-12 ENCOUNTER — TELEPHONE (OUTPATIENT)
Dept: CARDIOLOGY | Facility: CLINIC | Age: 77
End: 2022-01-12

## 2022-01-12 ENCOUNTER — HOSPITAL ENCOUNTER (INPATIENT)
Facility: HOSPITAL | Age: 77
LOS: 2 days | Discharge: HOME HEALTH | DRG: 064 | End: 2022-01-14
Attending: EMERGENCY MEDICINE | Admitting: FAMILY MEDICINE
Payer: MEDICARE

## 2022-01-12 DIAGNOSIS — I63.411 EMBOLIC STROKE INVOLVING RIGHT MIDDLE CEREBRAL ARTERY: ICD-10-CM

## 2022-01-12 DIAGNOSIS — I63.9 STROKE: ICD-10-CM

## 2022-01-12 DIAGNOSIS — I63.9 CVA (CEREBRAL VASCULAR ACCIDENT): ICD-10-CM

## 2022-01-12 DIAGNOSIS — I63.9 CEREBROVASCULAR ACCIDENT (CVA), UNSPECIFIED MECHANISM: Primary | ICD-10-CM

## 2022-01-12 DIAGNOSIS — I48.91 A-FIB: ICD-10-CM

## 2022-01-12 DIAGNOSIS — I48.91 ATRIAL FIBRILLATION: ICD-10-CM

## 2022-01-12 DIAGNOSIS — E13.10 DIABETIC KETOACIDOSIS WITHOUT COMA ASSOCIATED WITH OTHER SPECIFIED DIABETES MELLITUS: ICD-10-CM

## 2022-01-12 PROBLEM — N17.9 AKI (ACUTE KIDNEY INJURY): Status: ACTIVE | Noted: 2022-01-12

## 2022-01-12 PROBLEM — E11.10 DIABETIC KETOACIDOSIS WITHOUT COMA ASSOCIATED WITH TYPE 2 DIABETES MELLITUS: Status: ACTIVE | Noted: 2022-01-12

## 2022-01-12 LAB
ALBUMIN SERPL BCP-MCNC: 3.5 G/DL (ref 3.5–5.2)
ALLENS TEST: ABNORMAL
ALLENS TEST: NORMAL
ALP SERPL-CCNC: 107 U/L (ref 55–135)
ALT SERPL W/O P-5'-P-CCNC: 29 U/L (ref 10–44)
ANION GAP SERPL CALC-SCNC: 10 MMOL/L (ref 8–16)
ANION GAP SERPL CALC-SCNC: 18 MMOL/L (ref 8–16)
ANION GAP SERPL CALC-SCNC: 21 MMOL/L (ref 8–16)
ANION GAP SERPL CALC-SCNC: 25 MMOL/L (ref 8–16)
APTT BLDCRRT: 26 SEC (ref 21–32)
AST SERPL-CCNC: 28 U/L (ref 10–40)
B-OH-BUTYR BLD STRIP-SCNC: 6.1 MMOL/L (ref 0–0.5)
BASOPHILS # BLD AUTO: 0.01 K/UL (ref 0–0.2)
BASOPHILS NFR BLD: 0.1 % (ref 0–1.9)
BILIRUB SERPL-MCNC: 1.7 MG/DL (ref 0.1–1)
BUN SERPL-MCNC: 42 MG/DL (ref 8–23)
BUN SERPL-MCNC: 43 MG/DL (ref 8–23)
BUN SERPL-MCNC: 45 MG/DL (ref 8–23)
BUN SERPL-MCNC: 48 MG/DL (ref 8–23)
CALCIUM SERPL-MCNC: 8.2 MG/DL (ref 8.7–10.5)
CALCIUM SERPL-MCNC: 8.2 MG/DL (ref 8.7–10.5)
CALCIUM SERPL-MCNC: 8.7 MG/DL (ref 8.7–10.5)
CALCIUM SERPL-MCNC: 8.9 MG/DL (ref 8.7–10.5)
CHLORIDE SERPL-SCNC: 100 MMOL/L (ref 95–110)
CHLORIDE SERPL-SCNC: 103 MMOL/L (ref 95–110)
CHLORIDE SERPL-SCNC: 110 MMOL/L (ref 95–110)
CHLORIDE SERPL-SCNC: 113 MMOL/L (ref 95–110)
CO2 SERPL-SCNC: 12 MMOL/L (ref 23–29)
CO2 SERPL-SCNC: 16 MMOL/L (ref 23–29)
CO2 SERPL-SCNC: 16 MMOL/L (ref 23–29)
CO2 SERPL-SCNC: 21 MMOL/L (ref 23–29)
CREAT SERPL-MCNC: 1.4 MG/DL (ref 0.5–1.4)
CREAT SERPL-MCNC: 1.4 MG/DL (ref 0.5–1.4)
CREAT SERPL-MCNC: 1.6 MG/DL (ref 0.5–1.4)
CREAT SERPL-MCNC: 1.7 MG/DL (ref 0.5–1.4)
CREAT SERPL-MCNC: 1.7 MG/DL (ref 0.5–1.4)
CTP QC/QA: YES
DIFFERENTIAL METHOD: ABNORMAL
EOSINOPHIL # BLD AUTO: 0 K/UL (ref 0–0.5)
EOSINOPHIL NFR BLD: 0 % (ref 0–8)
ERYTHROCYTE [DISTWIDTH] IN BLOOD BY AUTOMATED COUNT: 12.5 % (ref 11.5–14.5)
EST. GFR  (AFRICAN AMERICAN): 44 ML/MIN/1.73 M^2
EST. GFR  (AFRICAN AMERICAN): 44 ML/MIN/1.73 M^2
EST. GFR  (AFRICAN AMERICAN): 47 ML/MIN/1.73 M^2
EST. GFR  (AFRICAN AMERICAN): 55 ML/MIN/1.73 M^2
EST. GFR  (NON AFRICAN AMERICAN): 38 ML/MIN/1.73 M^2
EST. GFR  (NON AFRICAN AMERICAN): 38 ML/MIN/1.73 M^2
EST. GFR  (NON AFRICAN AMERICAN): 41 ML/MIN/1.73 M^2
EST. GFR  (NON AFRICAN AMERICAN): 48 ML/MIN/1.73 M^2
GLUCOSE SERPL-MCNC: 110 MG/DL (ref 70–110)
GLUCOSE SERPL-MCNC: 210 MG/DL (ref 70–110)
GLUCOSE SERPL-MCNC: 497 MG/DL (ref 70–110)
GLUCOSE SERPL-MCNC: 555 MG/DL (ref 70–110)
HCO3 UR-SCNC: 16.3 MMOL/L (ref 24–28)
HCT VFR BLD AUTO: 41.3 % (ref 40–54)
HCT VFR BLD CALC: 40 %PCV (ref 36–54)
HGB BLD-MCNC: 13.6 G/DL (ref 14–18)
HGB BLD-MCNC: 14 G/DL
IMM GRANULOCYTES # BLD AUTO: 0.09 K/UL (ref 0–0.04)
IMM GRANULOCYTES NFR BLD AUTO: 0.8 % (ref 0–0.5)
INR PPP: 1.1 (ref 0.8–1.2)
LACTATE SERPL-SCNC: 1.4 MMOL/L (ref 0.5–2.2)
LACTATE SERPL-SCNC: 3.9 MMOL/L (ref 0.5–2.2)
LYMPHOCYTES # BLD AUTO: 1.2 K/UL (ref 1–4.8)
LYMPHOCYTES NFR BLD: 11 % (ref 18–48)
MCH RBC QN AUTO: 28.7 PG (ref 27–31)
MCHC RBC AUTO-ENTMCNC: 32.9 G/DL (ref 32–36)
MCV RBC AUTO: 87 FL (ref 82–98)
MONOCYTES # BLD AUTO: 1.3 K/UL (ref 0.3–1)
MONOCYTES NFR BLD: 11.9 % (ref 4–15)
NEUTROPHILS # BLD AUTO: 8.2 K/UL (ref 1.8–7.7)
NEUTROPHILS NFR BLD: 76.2 % (ref 38–73)
NRBC BLD-RTO: 0 /100 WBC
PCO2 BLDA: 33.6 MMHG (ref 35–45)
PH SMN: 7.29 [PH] (ref 7.35–7.45)
PLATELET # BLD AUTO: 238 K/UL (ref 150–450)
PMV BLD AUTO: 10.2 FL (ref 9.2–12.9)
PO2 BLDA: 33 MMHG (ref 40–60)
POC BE: -10 MMOL/L
POC PTINR: 1.3 (ref 0.9–1.2)
POC PTWBT: 15.3 SEC (ref 9.7–14.3)
POC SATURATED O2: 57 % (ref 95–100)
POC TCO2: 17 MMOL/L (ref 24–29)
POCT GLUCOSE: 186 MG/DL (ref 70–110)
POCT GLUCOSE: 199 MG/DL (ref 70–110)
POCT GLUCOSE: 201 MG/DL (ref 70–110)
POCT GLUCOSE: 218 MG/DL (ref 70–110)
POCT GLUCOSE: 336 MG/DL (ref 70–110)
POCT GLUCOSE: 397 MG/DL (ref 70–110)
POCT GLUCOSE: 438 MG/DL (ref 70–110)
POCT GLUCOSE: 479 MG/DL (ref 70–110)
POCT GLUCOSE: 53 MG/DL (ref 70–110)
POCT GLUCOSE: 76 MG/DL (ref 70–110)
POCT GLUCOSE: 98 MG/DL (ref 70–110)
POCT GLUCOSE: 99 MG/DL (ref 70–110)
POTASSIUM BLD-SCNC: 4.7 MMOL/L (ref 3.5–5.1)
POTASSIUM SERPL-SCNC: 3.9 MMOL/L (ref 3.5–5.1)
POTASSIUM SERPL-SCNC: 4.1 MMOL/L (ref 3.5–5.1)
POTASSIUM SERPL-SCNC: 4.7 MMOL/L (ref 3.5–5.1)
POTASSIUM SERPL-SCNC: 5.4 MMOL/L (ref 3.5–5.1)
PROT SERPL-MCNC: 7.2 G/DL (ref 6–8.4)
PROTHROMBIN TIME: 11.1 SEC (ref 9–12.5)
RBC # BLD AUTO: 4.74 M/UL (ref 4.6–6.2)
SAMPLE: ABNORMAL
SAMPLE: ABNORMAL
SAMPLE: NORMAL
SARS-COV-2 RDRP RESP QL NAA+PROBE: NEGATIVE
SODIUM BLD-SCNC: 138 MMOL/L (ref 136–145)
SODIUM SERPL-SCNC: 137 MMOL/L (ref 136–145)
SODIUM SERPL-SCNC: 140 MMOL/L (ref 136–145)
SODIUM SERPL-SCNC: 144 MMOL/L (ref 136–145)
SODIUM SERPL-SCNC: 144 MMOL/L (ref 136–145)
TSH SERPL DL<=0.005 MIU/L-ACNC: 0.51 UIU/ML (ref 0.4–4)
WBC # BLD AUTO: 10.72 K/UL (ref 3.9–12.7)

## 2022-01-12 PROCEDURE — 99291 CRITICAL CARE FIRST HOUR: CPT | Mod: 25

## 2022-01-12 PROCEDURE — 25000003 PHARM REV CODE 250: Performed by: FAMILY MEDICINE

## 2022-01-12 PROCEDURE — 80053 COMPREHEN METABOLIC PANEL: CPT | Performed by: EMERGENCY MEDICINE

## 2022-01-12 PROCEDURE — 83605 ASSAY OF LACTIC ACID: CPT | Performed by: FAMILY MEDICINE

## 2022-01-12 PROCEDURE — 82010 KETONE BODYS QUAN: CPT | Performed by: EMERGENCY MEDICINE

## 2022-01-12 PROCEDURE — 20000000 HC ICU ROOM

## 2022-01-12 PROCEDURE — 25000003 PHARM REV CODE 250: Performed by: NURSE PRACTITIONER

## 2022-01-12 PROCEDURE — 80048 BASIC METABOLIC PNL TOTAL CA: CPT | Mod: 91 | Performed by: FAMILY MEDICINE

## 2022-01-12 PROCEDURE — 25000003 PHARM REV CODE 250: Performed by: EMERGENCY MEDICINE

## 2022-01-12 PROCEDURE — 93010 EKG 12-LEAD: ICD-10-PCS | Mod: ,,, | Performed by: INTERNAL MEDICINE

## 2022-01-12 PROCEDURE — 99900035 HC TECH TIME PER 15 MIN (STAT): Mod: HCNC

## 2022-01-12 PROCEDURE — 80048 BASIC METABOLIC PNL TOTAL CA: CPT | Performed by: EMERGENCY MEDICINE

## 2022-01-12 PROCEDURE — G0427 INPT/ED TELECONSULT70: HCPCS | Mod: 95,HCNC,, | Performed by: PSYCHIATRY & NEUROLOGY

## 2022-01-12 PROCEDURE — 93005 ELECTROCARDIOGRAM TRACING: CPT | Mod: HCNC

## 2022-01-12 PROCEDURE — 83605 ASSAY OF LACTIC ACID: CPT | Mod: 91 | Performed by: NURSE PRACTITIONER

## 2022-01-12 PROCEDURE — S0166 INJ OLANZAPINE 2.5MG: HCPCS | Performed by: EMERGENCY MEDICINE

## 2022-01-12 PROCEDURE — G0427 PR INPT TELEHEALTH CON 70/>M: ICD-10-PCS | Mod: 95,HCNC,, | Performed by: PSYCHIATRY & NEUROLOGY

## 2022-01-12 PROCEDURE — 93010 ELECTROCARDIOGRAM REPORT: CPT | Mod: ,,, | Performed by: INTERNAL MEDICINE

## 2022-01-12 PROCEDURE — 85610 PROTHROMBIN TIME: CPT | Performed by: EMERGENCY MEDICINE

## 2022-01-12 PROCEDURE — 36415 COLL VENOUS BLD VENIPUNCTURE: CPT | Performed by: FAMILY MEDICINE

## 2022-01-12 PROCEDURE — 80048 BASIC METABOLIC PNL TOTAL CA: CPT | Mod: 91 | Performed by: NURSE PRACTITIONER

## 2022-01-12 PROCEDURE — S5010 5% DEXTROSE AND 0.45% SALINE: HCPCS | Performed by: NURSE PRACTITIONER

## 2022-01-12 PROCEDURE — U0002 COVID-19 LAB TEST NON-CDC: HCPCS | Mod: HCNC | Performed by: EMERGENCY MEDICINE

## 2022-01-12 PROCEDURE — 85730 THROMBOPLASTIN TIME PARTIAL: CPT | Performed by: EMERGENCY MEDICINE

## 2022-01-12 PROCEDURE — 85025 COMPLETE CBC W/AUTO DIFF WBC: CPT | Performed by: EMERGENCY MEDICINE

## 2022-01-12 PROCEDURE — 63600175 PHARM REV CODE 636 W HCPCS: Mod: HCNC | Performed by: EMERGENCY MEDICINE

## 2022-01-12 PROCEDURE — 63600175 PHARM REV CODE 636 W HCPCS: Performed by: NURSE PRACTITIONER

## 2022-01-12 PROCEDURE — 84443 ASSAY THYROID STIM HORMONE: CPT | Performed by: EMERGENCY MEDICINE

## 2022-01-12 RX ORDER — ENOXAPARIN SODIUM 100 MG/ML
40 INJECTION SUBCUTANEOUS EVERY 24 HOURS
Status: DISCONTINUED | OUTPATIENT
Start: 2022-01-12 | End: 2022-01-14 | Stop reason: HOSPADM

## 2022-01-12 RX ORDER — OLANZAPINE 10 MG/2ML
5 INJECTION, POWDER, FOR SOLUTION INTRAMUSCULAR
Status: COMPLETED | OUTPATIENT
Start: 2022-01-12 | End: 2022-01-12

## 2022-01-12 RX ORDER — ACETAMINOPHEN 325 MG/1
650 TABLET ORAL EVERY 8 HOURS PRN
Status: DISCONTINUED | OUTPATIENT
Start: 2022-01-12 | End: 2022-01-14 | Stop reason: HOSPADM

## 2022-01-12 RX ORDER — MUPIROCIN 20 MG/G
OINTMENT TOPICAL 2 TIMES DAILY
Status: DISCONTINUED | OUTPATIENT
Start: 2022-01-12 | End: 2022-01-14 | Stop reason: HOSPADM

## 2022-01-12 RX ORDER — SODIUM CHLORIDE 0.9 % (FLUSH) 0.9 %
10 SYRINGE (ML) INJECTION
Status: DISCONTINUED | OUTPATIENT
Start: 2022-01-12 | End: 2022-01-14 | Stop reason: HOSPADM

## 2022-01-12 RX ORDER — ATORVASTATIN CALCIUM 40 MG/1
40 TABLET, FILM COATED ORAL DAILY
Status: DISCONTINUED | OUTPATIENT
Start: 2022-01-13 | End: 2022-01-14 | Stop reason: HOSPADM

## 2022-01-12 RX ORDER — ONDANSETRON 2 MG/ML
4 INJECTION INTRAMUSCULAR; INTRAVENOUS EVERY 12 HOURS PRN
Status: DISCONTINUED | OUTPATIENT
Start: 2022-01-12 | End: 2022-01-14 | Stop reason: HOSPADM

## 2022-01-12 RX ORDER — ACETAMINOPHEN 325 MG/1
650 TABLET ORAL EVERY 8 HOURS PRN
Status: DISCONTINUED | OUTPATIENT
Start: 2022-01-12 | End: 2022-01-12 | Stop reason: DRUGHIGH

## 2022-01-12 RX ORDER — ACETAMINOPHEN 325 MG/1
650 TABLET ORAL EVERY 4 HOURS PRN
Status: DISCONTINUED | OUTPATIENT
Start: 2022-01-12 | End: 2022-01-14 | Stop reason: HOSPADM

## 2022-01-12 RX ORDER — ACETAMINOPHEN 650 MG/1
650 SUPPOSITORY RECTAL EVERY 6 HOURS PRN
Status: DISCONTINUED | OUTPATIENT
Start: 2022-01-12 | End: 2022-01-14 | Stop reason: HOSPADM

## 2022-01-12 RX ORDER — DEXTROSE MONOHYDRATE AND SODIUM CHLORIDE 5; .45 G/100ML; G/100ML
125 INJECTION, SOLUTION INTRAVENOUS CONTINUOUS PRN
Status: DISCONTINUED | OUTPATIENT
Start: 2022-01-12 | End: 2022-01-14 | Stop reason: HOSPADM

## 2022-01-12 RX ORDER — ASPIRIN 81 MG/1
81 TABLET ORAL DAILY
Status: DISCONTINUED | OUTPATIENT
Start: 2022-01-13 | End: 2022-01-14 | Stop reason: HOSPADM

## 2022-01-12 RX ORDER — SODIUM CHLORIDE 9 MG/ML
INJECTION, SOLUTION INTRAVENOUS CONTINUOUS
Status: DISCONTINUED | OUTPATIENT
Start: 2022-01-12 | End: 2022-01-12

## 2022-01-12 RX ORDER — TALC
6 POWDER (GRAM) TOPICAL NIGHTLY PRN
Status: DISCONTINUED | OUTPATIENT
Start: 2022-01-12 | End: 2022-01-12 | Stop reason: SDUPTHER

## 2022-01-12 RX ORDER — TALC
6 POWDER (GRAM) TOPICAL NIGHTLY PRN
Status: DISCONTINUED | OUTPATIENT
Start: 2022-01-12 | End: 2022-01-14 | Stop reason: HOSPADM

## 2022-01-12 RX ORDER — LABETALOL HYDROCHLORIDE 5 MG/ML
10 INJECTION, SOLUTION INTRAVENOUS EVERY 6 HOURS
Status: DISCONTINUED | OUTPATIENT
Start: 2022-01-12 | End: 2022-01-12

## 2022-01-12 RX ORDER — SODIUM CHLORIDE, SODIUM LACTATE, POTASSIUM CHLORIDE, CALCIUM CHLORIDE 600; 310; 30; 20 MG/100ML; MG/100ML; MG/100ML; MG/100ML
INJECTION, SOLUTION INTRAVENOUS CONTINUOUS
Status: DISCONTINUED | OUTPATIENT
Start: 2022-01-12 | End: 2022-01-13

## 2022-01-12 RX ORDER — METOPROLOL SUCCINATE 25 MG/1
25 TABLET, EXTENDED RELEASE ORAL DAILY
Status: DISCONTINUED | OUTPATIENT
Start: 2022-01-13 | End: 2022-01-14 | Stop reason: HOSPADM

## 2022-01-12 RX ORDER — PANTOPRAZOLE SODIUM 40 MG/1
40 TABLET, DELAYED RELEASE ORAL DAILY
Status: DISCONTINUED | OUTPATIENT
Start: 2022-01-13 | End: 2022-01-14 | Stop reason: HOSPADM

## 2022-01-12 RX ORDER — PEN NEEDLE, DIABETIC 31 GX5/16"
NEEDLE, DISPOSABLE MISCELLANEOUS 3 TIMES DAILY
Status: ON HOLD | COMMUNITY
Start: 2021-10-02 | End: 2023-03-01 | Stop reason: HOSPADM

## 2022-01-12 RX ORDER — DEXTROSE MONOHYDRATE 100 MG/ML
INJECTION, SOLUTION INTRAVENOUS CONTINUOUS
Status: DISCONTINUED | OUTPATIENT
Start: 2022-01-12 | End: 2022-01-13

## 2022-01-12 RX ADMIN — SODIUM CHLORIDE: 0.9 INJECTION, SOLUTION INTRAVENOUS at 05:01

## 2022-01-12 RX ADMIN — DEXTROSE: 10 SOLUTION INTRAVENOUS at 11:01

## 2022-01-12 RX ADMIN — ENOXAPARIN SODIUM 40 MG: 100 INJECTION SUBCUTANEOUS at 05:01

## 2022-01-12 RX ADMIN — MUPIROCIN: 20 OINTMENT TOPICAL at 11:01

## 2022-01-12 RX ADMIN — DEXTROSE MONOHYDRATE 12.5 G: 500 INJECTION PARENTERAL at 11:01

## 2022-01-12 RX ADMIN — DEXTROSE AND SODIUM CHLORIDE 125 ML/HR: 5; .45 INJECTION, SOLUTION INTRAVENOUS at 10:01

## 2022-01-12 RX ADMIN — SODIUM CHLORIDE 1000 ML: 0.9 INJECTION, SOLUTION INTRAVENOUS at 01:01

## 2022-01-12 RX ADMIN — SODIUM CHLORIDE, SODIUM LACTATE, POTASSIUM CHLORIDE, AND CALCIUM CHLORIDE: .6; .31; .03; .02 INJECTION, SOLUTION INTRAVENOUS at 08:01

## 2022-01-12 RX ADMIN — OLANZAPINE 5 MG: 10 INJECTION, POWDER, FOR SOLUTION INTRAMUSCULAR at 05:01

## 2022-01-12 RX ADMIN — ACETAMINOPHEN 650 MG: 650 SUPPOSITORY RECTAL at 09:01

## 2022-01-12 RX ADMIN — SODIUM CHLORIDE, SODIUM LACTATE, POTASSIUM CHLORIDE, AND CALCIUM CHLORIDE 1000 ML: .6; .31; .03; .02 INJECTION, SOLUTION INTRAVENOUS at 03:01

## 2022-01-12 RX ADMIN — SODIUM CHLORIDE 8 UNITS/HR: 9 INJECTION, SOLUTION INTRAVENOUS at 03:01

## 2022-01-12 NOTE — ED PROVIDER NOTES
Encounter Date: 1/12/2022       History     Chief Complaint   Patient presents with    Facial Droop     Pt and family noticed a facial droop yesterday around 1030 am. Pt had cardiac stents placed x2 days ago and is on blood thinners. Left, facial droop and slurred speech noted; no visual disturbances  or weakness noted. Aox4.        This is a 76-year-old gentleman with a past medical history of hypertension, insulin-dependent diabetes, coronary artery disease who presents for evaluation of now an almost 24 hours history of dysarthria and left facial drawing.  Patient was recently hospitalized for an acute coronary syndrome where upon cardiac catheterization revealed an occlusion of the mid RCA.  Patient underwent a successful PCI of the proximal and mid RCA.  Patient returned home yesterday late morning where upon his family noted that he was dysarthric and exhibited left facial drawing.  Patient refused to return to the hospital and now is brought by family for further investigation.  No headache.  No constitutional symptoms.  No chest pain or dyspnea.                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 8/2/2012    Type II or unspecified type diabetes mellitus with neurological manifestations, uncontrolled(250.62) 3/8/2013    Urinary tract infection      Past Surgical History:   Procedure Laterality Date    BACK SURGERY      CATARACT EXTRACTION W/  INTRAOCULAR LENS IMPLANT Right     Per Dr Romero note 11/2018    COLONOSCOPY N/A 1/28/2019    Procedure: COLONOSCOPY Suprep;  Surgeon: Anh Johnson MD;  Location[facility];  Service: Endoscopy;  Laterality: N/A;    EYE SURGERY       HERNIA REPAIR      LEFT HEART CATHETERIZATION Left 2022    Procedure: CATHETERIZATION, HEART, LEFT;  Surgeon: Will Hurst III, MD;  Location[facility] CATH LAB[facility]EP;  Service: Cardiology;  Laterality: Left;    renal stones      SHOULDER OPEN ROTATOR CUFF REPAIR       Family History   Problem Relation Age of Onset    Prostate cancer Neg Hx     Kidney disease Neg Hx      Social History     Tobacco Use    Smoking status: Former Smoker     Packs/day: 1.50     Years: 25.00     Pack years: 37.50     Quit date: 1983     Years since quittin.0    Smokeless tobacco: Never Used   Substance Use Topics    Alcohol use: No    Drug use: No     Review of Systems   All other systems reviewed and are negative.      Physical Exam     Initial Vitals   BP Pulse Resp Temp SpO2   22 1135 22 1127 22 1127 22 1136 22 1127   132/76 77 (!) 27 97.5 °F (36.4 °C) (!) 91 %      MAP       --                Physical Exam    Constitutional: Vital signs are normal. He appears well-developed and well-nourished.   HENT:   Head: Normocephalic and atraumatic.   Eyes: Conjunctivae, EOM and lids are normal. Pupils are equal, round, and reactive to light.   Visual fields intact.   Neck: Trachea normal and phonation normal. Neck supple. No thyroid mass and no thyromegaly present.   Normal range of motion.   Full passive range of motion without pain.     Cardiovascular: Normal rate, regular rhythm, normal heart sounds, intact distal pulses and normal pulses.   Pulmonary/Chest: Breath sounds normal.   Abdominal: Abdomen is soft. Normal appearance. There is no abdominal tenderness.   Musculoskeletal:         General: Normal range of motion.      Right shoulder: No swelling, deformity or tenderness.      Cervical back: Full passive range of motion without pain, normal range of motion and neck supple. Normal.     Neurological: He is alert and oriented to person, place, and time. He has normal strength. A  cranial nerve deficit is present. No sensory deficit. GCS score is 15. GCS eye subscore is 4. GCS verbal subscore is 5. GCS motor subscore is 6.   Slight left facial drawing, no sensory deficits.   Skin: Skin is warm, dry and intact.   Psychiatric: He has a normal mood and affect. His speech is normal and behavior is normal.         ED Course   Critical Care    Date/Time: 1/12/2022 11:44 AM  Performed by: Arik Whelan MD  Authorized by: Arik Whelan MD   Total critical care time (exclusive of procedural time) : 35 minutes  Critical care time was exclusive of separately billable procedures and treating other patients.  Critical care was necessary to treat or prevent imminent or life-threatening deterioration of the following conditions: CNS failure or compromise and metabolic crisis.  Critical care was time spent personally by me on the following activities: ordering and review of laboratory studies, review of old charts, development of treatment plan with patient or surrogate, examination of patient, ordering and performing treatments and interventions, ordering and review of radiographic studies, re-evaluation of patient's condition and discussions with consultants.        Labs Reviewed   CBC W/ AUTO DIFFERENTIAL - Abnormal; Notable for the following components:       Result Value    Hemoglobin 13.6 (*)     Immature Granulocytes 0.8 (*)     Gran # (ANC) 8.2 (*)     Immature Grans (Abs) 0.09 (*)     Mono # 1.3 (*)     Gran % 76.2 (*)     Lymph % 11.0 (*)     All other components within normal limits   COMPREHENSIVE METABOLIC PANEL - Abnormal; Notable for the following components:    Potassium 5.4 (*)     CO2 16 (*)     Glucose 555 (*)     BUN 48 (*)     Creatinine 1.7 (*)     Total Bilirubin 1.7 (*)     Anion Gap 21 (*)     eGFR if  44 (*)     eGFR if non  38 (*)     All other components within normal limits    Narrative:     GLU critical result(s) called and verbal readback  obtained from Serena Gupta RN. by RL1 01/12/2022 11:28   ISTAT PROCEDURE - Abnormal; Notable for the following components:    POC PTWBT 15.3 (*)     POC PTINR 1.3 (*)     All other components within normal limits   TSH   PROTIME-INR   APTT   SARS-COV-2 RDRP GENE   POCT GLUCOSE, HAND-HELD DEVICE   ISTAT CREATININE     EKG Readings: (Independently Interpreted)   Initial Reading: No STEMI. Previous EKG: Compared with most recent EKG Heart Rate: 74. Ectopy: Bigeminy.   Evidence of ventricular bigeminy for 4 beats in sinus, positive artifact, positive inferior Q-wave with ST elevation, QTC of 446.        Imaging Results          X-Ray Chest AP Portable (Final result)  Result time 01/12/22 11:46:09    Final result by Terry Ralph DO (01/12/22 11:46:09)                 Impression:      No acute cardiopulmonary abnormality.      Electronically signed by: Terry Ralph  Date:    01/12/2022  Time:    11:46             Narrative:    EXAMINATION:  XR CHEST AP PORTABLE    CLINICAL HISTORY:  Stroke;    TECHNIQUE:  Single frontal view of the chest was performed.    COMPARISON:  01/09/2022.    FINDINGS:  The lungs are well expanded.  There is minimal atelectasis or scarring within the right upper lung and the bilateral lung bases.  The lungs are otherwise clear.  No focal consolidation.  The pleural spaces are clear.    The cardiac silhouette is unremarkable.    The visualized osseous structures are intact.                                CT Head Without Contrast (Final result)  Result time 01/12/22 11:03:53    Final result by Terry Ralph DO (01/12/22 11:03:53)                 Impression:      1. Acute or subacute infarctions within the right frontal lobe and the left cerebellar hemisphere.  2. Mild hyperdensity about the right frontal infarction is concerning for petechial hemorrhagic transformation.  3. Consider further evaluation with MRI as clinically indicated.  This report was flagged in Epic as abnormal.      Loree discussed critical findings with Dr. Whelan by telephone at 11:00 on 01/12/2022.      Electronically signed by: Terry Ralph  Date:    01/12/2022  Time:    11:03             Narrative:    EXAMINATION:  CT HEAD WITHOUT CONTRAST    CLINICAL HISTORY:  Stroke, follow up;    TECHNIQUE:  Low dose axial CT images obtained throughout the head without intravenous contrast. Sagittal and coronal reconstructions were performed.    COMPARISON:  None available.    FINDINGS:  There are hypodensities with loss of gray-white differentiation within the right frontal lobe and within the left cerebellar hemisphere, compatible with acute or subacute infarctions.  Mild hyper density about the right frontal infarction is concerning for petechial hemorrhagic transformation.    Ventricles and sulci are normal in size for age without evidence of hydrocephalus.  There is no evidence of midline shift.  No extra-axial blood or fluid collections.  No large parenchymal mass.  There are intracranial atherosclerotic vascular calcifications.    No calvarial fracture.  The scalp is unremarkable.  Bilateral paranasal sinuses and mastoid air cells are clear.  There are bilateral prosthetic lenses.                                 Medications   sodium chloride 0.9% bolus 1,000 mL (has no administration in time range)   insulin regular 1 Units/mL in sodium chloride 0.9% 100 mL infusion (has no administration in time range)     Medical Decision Making:   ED Management:  11:42 AM  Patient has been seen and evaluated by tele Neurology who agrees that patient is not a candidate for tPA because he presents for outside the therapeutic window and is on Brilinta.  CT scan does exhibit evidence of acute to subacute CVAs residing in the right frontal and left cerebellar aspect respectively, with tiny petechial hemorrhagic transformation of the right frontal lesion.  Neurology recommends admission with follow-up MRI of the brain.  Patient is pan negative,  no indication for selective thrombectomy or CTA imaging at this juncture.    1:08 PM  Concern for possible DKA.  IV normal saline has been ordered.  Patient to be admitted to Dr. Benitez service.  Will start insulin infusion.  Potassium 5.4.                      Clinical Impression:   Final diagnoses:  [I63.9] Stroke  [I63.9] Cerebrovascular accident (CVA), unspecified mechanism (Primary)  [E13.10] Diabetic ketoacidosis without coma associated with other specified diabetes mellitus          ED Disposition Condition    Admit               Arik Whelan MD  01/12/22 2644

## 2022-01-12 NOTE — TELEPHONE ENCOUNTER
Mariluz states that her sister is on her way to the house and together they will attempt to bring pt to the hospital in personal vehicle.

## 2022-01-12 NOTE — SUBJECTIVE & OBJECTIVE
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 2012    Type II or unspecified type diabetes mellitus with neurological manifestations, uncontrolled(250.62) 3/8/2013    Urinary tract infection        Past Surgical History:   Procedure Laterality Date    BACK SURGERY      CATARACT EXTRACTION W/  INTRAOCULAR LENS IMPLANT Right     Per Dr Romero note 2018    COLONOSCOPY N/A 2019    Procedure: COLONOSCOPY Suprep;  Surgeon: Anh Johnson MD;  Location: [facility] ENDO;  Service: Endoscopy;  Laterality: N/A;    EYE SURGERY      HERNIA REPAIR      LEFT HEART CATHETERIZATION Left 2022    Procedure: CATHETERIZATION, HEART, LEFT;  Surgeon: Will Hurst III, MD;  Location: [facility] CATH LAB/EP;  Service: Cardiology;  Laterality: Left;    renal stones      SHOULDER OPEN ROTATOR CUFF REPAIR         Family History   Problem Relation Age of Onset    Prostate cancer Neg Hx     Kidney disease Neg Hx        Social History     Socioeconomic History    Marital status:    Tobacco Use    Smoking status: Former Smoker     Packs/day: 1.50     Years: 25.00     Pack years: 37.50     Quit date: 1983     Years since quittin.0    Smokeless tobacco: Never Used   Substance and Sexual Activity    Alcohol use: No    Drug use: No    Sexual activity: Yes     Partners: Female   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       Current Facility-Administered Medications   Medication Dose Route Frequency Provider Last Rate Last Admin    acetaminophen suppository 650 mg  650 mg Rectal Q6H PRN Nyla Tripp NP   650 mg at 22    acetaminophen tablet 650 mg  650 mg Oral Q8H PRN Arik Whelan MD        acetaminophen tablet 650 mg  650 mg Oral Q4H PRN Nyla Tripp NP         aspirin EC tablet 81 mg  81 mg Oral Daily Nyla Tripp NP   81 mg at 01/13/22 0823    atorvastatin tablet 40 mg  40 mg Oral Daily Nyla Tripp NP   40 mg at 01/13/22 0823    dextrose 5 % and 0.45 % NaCl infusion  125 mL/hr Intravenous Continuous PRN Nyla Tripp NP   Paused at 01/12/22 2308    dextrose 50% injection 12.5 g  12.5 g Intravenous PRN Nyla Tripp NP   12.5 g at 01/12/22 2311    dextrose 50% injection 25 g  25 g Intravenous PRN Nyla Tripp NP        enoxaparin injection 40 mg  40 mg Subcutaneous Daily Nyla Tripp NP   40 mg at 01/12/22 1731    glucagon (human recombinant) injection 1 mg  1 mg Intramuscular PRN Hyun Obando NP        glucose chewable tablet 16 g  16 g Oral PRN Hyun Obando NP        glucose chewable tablet 24 g  24 g Oral PRN Hyun Obando NP        influenza (QUADRIVALENT ADJUVANTED PF) vaccine 0.5 mL  0.5 mL Intramuscular Prior to discharge Heather Vitale MD        insulin aspart U-100 pen 0-5 Units  0-5 Units Subcutaneous QID (AC + HS) PRN Hyun Obando NP   2 Units at 01/13/22 0825    insulin detemir U-100 pen 21 Units  21 Units Subcutaneous QHS Huey Diaz MD   21 Units at 01/13/22 0541    lactated ringers infusion   Intravenous Continuous Nyla Tripp  mL/hr at 01/13/22 0621 Rate Verify at 01/13/22 0621    melatonin tablet 6 mg  6 mg Oral Nightly PRN Arik Whelan MD        metoprolol succinate (TOPROL-XL) 24 hr tablet 25 mg  25 mg Oral Daily Nyla Tripp NP   25 mg at 01/13/22 0823    mupirocin 2 % ointment   Nasal BID Heather Vitale MD   Given at 01/13/22 0829    ondansetron injection 4 mg  4 mg Intravenous Q12H PRN Nyla Tripp NP        pantoprazole EC tablet 40 mg  40 mg Oral Daily Nyla Tripp NP   40 mg at 01/13/22 0823    pneumoc 13-edyta conj-dip cr(PF) (PREVNAR 13 (PF)) 0.5 mL  0.5 mL  "Intramuscular Prior to discharge Heather Vitale MD        sodium chloride 0.9% bolus 500 mL  500 mL Intravenous Continuous PRN Nyla Tripp, NP        sodium chloride 0.9% flush 10 mL  10 mL Intravenous PRN Arik Whelan MD        sodium chloride 0.9% flush 10 mL  10 mL Intravenous PRN Arik Whelan MD        sodium chloride 0.9% flush 10 mL  10 mL Intravenous PRN Nyla Tripp, NP        sodium chloride 0.9% flush 10 mL  10 mL Intravenous PRN Nyla Tripp, NP        sodium chloride 0.9% flush 10 mL  10 mL Intravenous PRN Nyla Tripp, LUIS        ticagrelor tablet 90 mg  90 mg Oral BID Nyla Tripp NP   90 mg at 01/13/22 0823     Home medications reviewed    Review of patient's allergies indicates:   Allergen Reactions    Iodine      Other reaction(s): swelling  Other reaction(s): Itching  Other reaction(s): Rash         Review of Systems   Constitutional: Negative for chills and fever.   HENT: Negative for drooling and trouble swallowing.    Eyes: Negative for pain and visual disturbance.   Respiratory: Negative for cough and shortness of breath.    Cardiovascular: Negative for chest pain and palpitations.   Gastrointestinal: Negative for abdominal pain, nausea and vomiting.   Genitourinary: Negative for difficulty urinating and hematuria.   Musculoskeletal: Negative for gait problem.   Skin: Negative for color change and rash.   Neurological: Positive for facial asymmetry and speech difficulty. Negative for dizziness, weakness, light-headedness and headaches.   Psychiatric/Behavioral: Negative for confusion.   All other systems reviewed and are negative.    Objective:   Vitals: BP (!) 165/69   Pulse 105   Temp 97.2 °F (36.2 °C) (Oral)   Resp 20   Ht 6' 1" (1.854 m)   Wt 90.5 kg (199 lb 8.3 oz)   SpO2 (!) 90%   BMI 26.32 kg/m²       Physical Exam  Constitutional:       General: He is not in acute distress.  HENT:      Head: Normocephalic and " atraumatic.   Eyes:      Extraocular Movements: EOM normal.   Pulmonary:      Effort: Pulmonary effort is normal. No respiratory distress.   Neurological:      Mental Status: He is alert.   Psychiatric:         Attention and Perception: Attention normal.         Mood and Affect: Affect is flat.       Neurological Exam  LOC: alert  Attention Span: Patient able to follow exam but flat affect and requires repeat instructions  Language: No aphasia  Articulation: Mild dysarthria  Orientation: Person, Place, Time but did get month incorrect  Visual Fields: Full  EOM (CN III, IV, VI): Full/intact  Pupils (CN II, III): PERRL  Facial Sensation (CN V): Normal  Facial Movement (CN VII): Lower facial weakness on the Left  Motor: Arm left  Full antigravity; some loss of power noted with nurse assistance  Leg left    Arm right  Full antigravity; full power noted with nurse assistance  Leg right Full antigravity; full power noted with nurse assistance  Cerebellum: No evidence of appendicular or axial ataxia  Sensation: Intact to light touch    Diagnostic Results     Brain Imaging   1/12/2022 CT Head  Hypodensity right frontal MCA territory and left cerebellum. Changes within right frontal area concerning for petechial hemorrhagic transformation. No significant mass effect.     Vessel Imaging   MRA pending    Cardiac Imaging   TTE pending

## 2022-01-12 NOTE — CONSULTS
[facility]  Vascular Neurology  Comprehensive Stroke Center  TeleVascular Neurology Acute Consultation Note      Consults    Consulting Provider: KRISTIAN ELIZALDE  Current Providers  No providers found    Patient Location:  [facility] EMERGENCY DEPARTMENT Emergency Department  Spoke hospital nurse at bedside with patient assisting consultant.     Patient information was obtained from patient.         Assessment/Plan:       Diagnoses:   Acute cardioembolic stroke  77 y/o man presenting with facial droop following cardiac cath/STEMI, now in afib with evidence of subacute embolic strokes on CTH.  Recommend admission for further evaluation.  Continue DAPT, obtain MRI/MRA head/neck.  Cerebellar stroke not large enough that significant mass effect anticipated, but maintain Na >140.          STROKE DOCUMENTATION     Acute Stroke Times:   Acute Stroke Times   Last Known Normal Date: 01/11/22  Last Known Normal Time: 1030  Stroke Team Called Date: 01/12/22  Stroke Team Called Time: 1100  Stroke Team Arrival Date: 01/12/22  Stroke Team Arrival Time: 1105  CT Interpretation Time: 1110  Alteplase Recommended: No  Thrombectomy Recommended: No    NIH Scale:  1a. Level of Consciousness: 0-->Alert, keenly responsive  1b. LOC Questions: 0-->Answers both questions correctly  1c. LOC Commands: 0-->Performs both tasks correctly  2. Best Gaze: 0-->Normal  3. Visual: 0-->No visual loss  4. Facial Palsy: 2-->Partial paralysis (total or near-total paralysis of lower face)  5a. Motor Arm, Left: 0-->No drift, limb holds 90 (or 45) degrees for full 10 secs  5b. Motor Arm, Right: 0-->No drift, limb holds 90 (or 45) degrees for full 10 secs  6a. Motor Leg, Left: 0-->No drift, leg holds 30 degree position for full 5 secs  6b. Motor Leg, Right: 0-->No drift, leg holds 30 degree position for full 5 secs  7. Limb Ataxia: 0-->Absent  8. Sensory: 0-->Normal, no sensory loss  9. Best Language: 0-->No aphasia, normal  10.  Dysarthria: 1-->Mild-to-moderate dysarthria, patient slurs at least some words and, at worst, can be understood with some difficulty  11. Extinction and Inattention (formerly Neglect): 0-->No abnormality  Total (NIH Stroke Scale): 3     Modified [place]    Patsy Coma Scale:    ABCD2 Score:    [id number] Score:   HAS -BLED Score:   ICH Score:   Hunt & Alexander Classification:       There were no vitals taken for this visit.  Alteplase Eligible?: No  Alteplase Recommendation: Alteplase not recommended due to Outside of treatment window   Possible Interventional Revascularization Candidate? No; Large core infarct on imaging     Disposition Recommendation: admit to inpatient    Subjective:     History of Present Illness:  75 y/o man with HTN, T2DM recent cardiac cath and stenting, presenting with facial droop.  First noted by family yesterday morning.  Has been on ASA/Brillinta since cath stenting on 1/9.  In ED noted to be in afib.        Woke up with symptoms?: yes    Recent bleeding noted: no  Does the patient take any Blood Thinners? yes  Medications: Antiplatelets:  aspirin and ticagrelor/Brilinta      Past Medical History: hypertension, diabetes and MI/CAD    Past Surgical History: no relevant surgical history    Family History: no relevant history    Social History: no smoking, no drinking, no drugs    Allergies: Iodine No relevant allergies    Review of Systems   Neurological: Positive for facial asymmetry.   All other systems reviewed and are negative.    Objective:   Vitals: There were no vitals taken for this visit. Height: .    CT READ: Yes  Abnormal CT R frontal and L cerebellar infarcts.     Physical Exam  Constitutional:       General: He is not in acute distress.  HENT:      Head: Normocephalic and atraumatic.   Eyes:      Extraocular Movements: EOM normal.      Pupils: Pupils are equal, round, and reactive to light.   Pulmonary:      Effort: Pulmonary effort is normal.   Neurological:      Comments: MS:  A&XO3, speech fluent, follows commands, no neglect  CN: PERRL, EOMI, L facial droop, mild dysarthria  Motor: no arm or leg drift  Sens: intact to LT  Coord: no ataxia on finger to nose                   Recommended the emergency room physician to have a brief discussion with the patient and/or family if available regarding the risks and benefits of treatment, and to briefly document the occurrence of that discussion in his clinical encounter note.     The attending portion of this evaluation, treatment, and documentation was performed per Halle Krishnamurthy MD via audiovisual.    Billing code:  (moderate to severe stroke, large areas of edema, some mimics)    · This patient has a critical neurological condition/illness, with high morbidity and mortality.  · There is a high probability for acute neurological change leading to clinical and possibly life-threatening deterioration requiring highest level of physician preparedness for urgent intervention.  · Care was coordinated with other physicians involved in the patient's care.  · Radiologic studies and laboratory data were reviewed and interpreted, and plan of care was re-assessed based on the results.  · Diagnosis, treatment options and prognosis may have been discussed with the patient and/or family members or caregiver.  · Further advanced medical management and further evaluation is warranted for his care.      In your opinion, this was a: Tier 2 Van Negative    Consult End Time: 11:18 AM     Halle Krishnamurthy MD  [facility] [place] Stroke Center  Vascular Neurology   [facility]

## 2022-01-12 NOTE — ED NOTES
Bed: EXAM 13  Expected date: 1/12/22  Expected time: 10:26 AM  Means of arrival:   Comments:  EMS: LEFT, FACIAL DROOP.

## 2022-01-12 NOTE — Clinical Note
Diagnosis: Cerebrovascular accident (CVA), unspecified mechanism [[id number]]   Admitting Provider:: KHOA LUCAS [[id number]]   Future Attending Provider: KHOA LUCAS [[id number]]   Reason for IP Medical Treatment  (Clinical interventions that can only be accomplished in the IP setting? ) :: AMS   Estimated Length of Stay:: 2 midnights   I certify that Inpatient services for greater than or equal to 2 midnights are medically necessary:: Yes   Plans for Post-Acute care--if anticipated (pick the single best option):: A. No post acute care anticipated at this time

## 2022-01-12 NOTE — TELEPHONE ENCOUNTER
Received call from pt's daughter Marisa who expressed concern regarding her father    She states the EMS was called to the house yesterday twice for change in behavior  Drooling   Unsteady gait   Fall  Incontinence    EMS completed assessment but he refused to come to the ED for evaluation    Daughter reports symptoms have worsened overnight and is concerned that her father will continue to refuse treatment     She is asking for advice

## 2022-01-12 NOTE — HPI
77 y/o male with HTN, DM2, CAD and recent STEMI brought to the ER by family for slurred speech and left facial droop.  Patient was recently admitted (1/9/2022) with chest pain and was found to have 100% occlusion of the RCA.  Patient underwent successful PCI with LAUREN placed.  Patient did well and was discharged 1/11/2022 with plans to continue DAPT (ASA+Ticagrelor) x 1 year.  Upon arrival after discharge home, family noted slurred speech and left facial droop.  Patient states he was not aware of these symptoms.  He initially refused to return to the ER but eventually allowed his family to call EMS.  Patient states he has never had these symptoms before.  He has no known history of stroke or TIA.  His symptoms have been about the same since they first started.  He received no treatment.  Initial CT imaging showed acute/subacute infarcts within the right frontal lobe and the left cerebellar hemisphere.

## 2022-01-12 NOTE — ED NOTES
Unable to collect urine d/t x2 episodes of urinary incontinence; will collect and send urine ASAP. Pt was cleaned and changed x3 and is now dry, clean, and comfortable but remains anxious. Will   continue to monitor.

## 2022-01-12 NOTE — ASSESSMENT & PLAN NOTE
75 y/o male with HTN, DM2, CAD, recent STEMI and new onset afib now with acute/subacute infarcts involving the right frontal MCA and left cerebellum noted on non-contrast head CT. Etiology most likely cardioembolic in setting of new onset afib and recent STEMI.  MRI/MRA/TTE pending workup. Unclear if current afib represents underlying PAF or if this was triggered by his recent MI.    Typical management would be to initiate anticoagulation to reduce risk of thromboembolic event from afib.  Patient has a [id number] score of 7 placing him at very high risk.  HAS-BLED score 3. Although risk will be higher given he is and will need to be on DAPT.      Patients's risk for stroke outweighs the risk of hemorrhage at this time.  Recommend starting anticoagulation.  His bleeding risk will be higher given continuation of DAPT in setting of LAUREN.  Implement bleeding risk reduction strategies - improved BP control, avoiding alcohol, avoiding other bleeding risk OTC meds (NSAIDS, etc.).  Could also consider switching ticagrelor to clopidegril for added bleeding risk reduction (would discuss with Cardiology).  Would also initiate PPI coverage.      Antithrombotics for secondary stroke prevention: Anticoagulants: Apixaban 5 mg BID - start 7 days post event due to size and presence of petechial hemorrhage.    Statins for secondary stroke prevention and hyperlipidemia, if present:   Statins: Atorvastatin- 40 mg daily    Aggressive risk factor modification: HTN, DM, HLD, Obesity, A-Fib, CAD     Rehab efforts: The patient has been evaluated by a stroke team provider and the therapy needs have been fully considered based off the presenting complaints and exam findings. The following therapy evaluations are needed: PT evaluate and treat, OT evaluate and treat, SLP evaluate and treat    Diagnostics ordered/pending: MRA head to assess vasculature, MRA neck/arch to assess vasculature, MRI head without contrast to assess brain parenchyma,  TTE to assess cardiac function/status     VTE prophylaxis: Enoxaparin 40 mg SQ every 24 hours    -TeleVascular Neurology to follow MRI/MRA/TTE and make additional recommendations as indicated  -Ambulatory referral to Vascular Neurology in 4-6 weeks (Consult Order [id number])

## 2022-01-13 ENCOUNTER — PATIENT OUTREACH (OUTPATIENT)
Dept: ADMINISTRATIVE | Facility: CLINIC | Age: 77
End: 2022-01-13
Payer: MEDICARE

## 2022-01-13 PROBLEM — I63.411 EMBOLIC STROKE INVOLVING RIGHT MIDDLE CEREBRAL ARTERY: Status: ACTIVE | Noted: 2022-01-12

## 2022-01-13 PROBLEM — R47.1 DYSARTHRIA AND ANARTHRIA: Status: ACTIVE | Noted: 2022-01-13

## 2022-01-13 PROBLEM — I63.442 EMBOLIC STROKE INVOLVING LEFT CEREBELLAR ARTERY: Status: ACTIVE | Noted: 2022-01-13

## 2022-01-13 LAB
ANION GAP SERPL CALC-SCNC: 10 MMOL/L (ref 8–16)
ANION GAP SERPL CALC-SCNC: 10 MMOL/L (ref 8–16)
ANION GAP SERPL CALC-SCNC: 11 MMOL/L (ref 8–16)
ANION GAP SERPL CALC-SCNC: 13 MMOL/L (ref 8–16)
ANION GAP SERPL CALC-SCNC: 14 MMOL/L (ref 8–16)
ANION GAP SERPL CALC-SCNC: 14 MMOL/L (ref 8–16)
APTT BLDCRRT: 28.2 SEC (ref 21–32)
BACTERIA #/AREA URNS HPF: ABNORMAL /HPF
BASOPHILS # BLD AUTO: 0.02 K/UL (ref 0–0.2)
BASOPHILS NFR BLD: 0.1 % (ref 0–1.9)
BILIRUB UR QL STRIP: NEGATIVE
BSA FOR ECHO PROCEDURE: 2.16 M2
BUN SERPL-MCNC: 33 MG/DL (ref 8–23)
BUN SERPL-MCNC: 34 MG/DL (ref 8–23)
BUN SERPL-MCNC: 37 MG/DL (ref 8–23)
BUN SERPL-MCNC: 38 MG/DL (ref 8–23)
BUN SERPL-MCNC: 41 MG/DL (ref 8–23)
BUN SERPL-MCNC: 41 MG/DL (ref 8–23)
CALCIUM SERPL-MCNC: 8 MG/DL (ref 8.7–10.5)
CALCIUM SERPL-MCNC: 8.1 MG/DL (ref 8.7–10.5)
CALCIUM SERPL-MCNC: 8.1 MG/DL (ref 8.7–10.5)
CALCIUM SERPL-MCNC: 8.2 MG/DL (ref 8.7–10.5)
CALCIUM SERPL-MCNC: 8.2 MG/DL (ref 8.7–10.5)
CALCIUM SERPL-MCNC: 8.5 MG/DL (ref 8.7–10.5)
CHLORIDE SERPL-SCNC: 107 MMOL/L (ref 95–110)
CHLORIDE SERPL-SCNC: 107 MMOL/L (ref 95–110)
CHLORIDE SERPL-SCNC: 109 MMOL/L (ref 95–110)
CHLORIDE SERPL-SCNC: 109 MMOL/L (ref 95–110)
CHLORIDE SERPL-SCNC: 110 MMOL/L (ref 95–110)
CHLORIDE SERPL-SCNC: 110 MMOL/L (ref 95–110)
CK MB SERPL-MCNC: 4.1 NG/ML (ref 0.1–6.5)
CK MB SERPL-RTO: 1.5 % (ref 0–5)
CK SERPL-CCNC: 271 U/L (ref 20–200)
CLARITY UR: CLEAR
CO2 SERPL-SCNC: 18 MMOL/L (ref 23–29)
CO2 SERPL-SCNC: 19 MMOL/L (ref 23–29)
CO2 SERPL-SCNC: 20 MMOL/L (ref 23–29)
CO2 SERPL-SCNC: 22 MMOL/L (ref 23–29)
CO2 SERPL-SCNC: 22 MMOL/L (ref 23–29)
CO2 SERPL-SCNC: 23 MMOL/L (ref 23–29)
COLOR UR: YELLOW
CREAT SERPL-MCNC: 1.2 MG/DL (ref 0.5–1.4)
CREAT SERPL-MCNC: 1.3 MG/DL (ref 0.5–1.4)
CREAT SERPL-MCNC: 1.3 MG/DL (ref 0.5–1.4)
CREAT SERPL-MCNC: 1.4 MG/DL (ref 0.5–1.4)
CREAT SERPL-MCNC: 1.4 MG/DL (ref 0.5–1.4)
CREAT SERPL-MCNC: 1.5 MG/DL (ref 0.5–1.4)
CV ECHO LV RWT: 0.41 CM
DIFFERENTIAL METHOD: ABNORMAL
ECHO LV POSTERIOR WALL: 1 CM (ref 0.6–1.1)
EJECTION FRACTION: 55 %
EOSINOPHIL # BLD AUTO: 0 K/UL (ref 0–0.5)
EOSINOPHIL NFR BLD: 0.1 % (ref 0–8)
ERYTHROCYTE [DISTWIDTH] IN BLOOD BY AUTOMATED COUNT: 12.6 % (ref 11.5–14.5)
EST. GFR  (AFRICAN AMERICAN): 51 ML/MIN/1.73 M^2
EST. GFR  (AFRICAN AMERICAN): 55 ML/MIN/1.73 M^2
EST. GFR  (AFRICAN AMERICAN): 55 ML/MIN/1.73 M^2
EST. GFR  (AFRICAN AMERICAN): >60 ML/MIN/1.73 M^2
EST. GFR  (NON AFRICAN AMERICAN): 44 ML/MIN/1.73 M^2
EST. GFR  (NON AFRICAN AMERICAN): 48 ML/MIN/1.73 M^2
EST. GFR  (NON AFRICAN AMERICAN): 48 ML/MIN/1.73 M^2
EST. GFR  (NON AFRICAN AMERICAN): 52 ML/MIN/1.73 M^2
EST. GFR  (NON AFRICAN AMERICAN): 52 ML/MIN/1.73 M^2
EST. GFR  (NON AFRICAN AMERICAN): 58 ML/MIN/1.73 M^2
FRACTIONAL SHORTENING: 57 % (ref 28–44)
GLUCOSE SERPL-MCNC: 186 MG/DL (ref 70–110)
GLUCOSE SERPL-MCNC: 214 MG/DL (ref 70–110)
GLUCOSE SERPL-MCNC: 220 MG/DL (ref 70–110)
GLUCOSE SERPL-MCNC: 225 MG/DL (ref 70–110)
GLUCOSE SERPL-MCNC: 250 MG/DL (ref 70–110)
GLUCOSE SERPL-MCNC: 250 MG/DL (ref 70–110)
GLUCOSE UR QL STRIP: ABNORMAL
HCT VFR BLD AUTO: 38.9 % (ref 40–54)
HGB BLD-MCNC: 12.6 G/DL (ref 14–18)
HGB UR QL STRIP: ABNORMAL
HYALINE CASTS #/AREA URNS LPF: 0 /LPF
IMM GRANULOCYTES # BLD AUTO: 0.04 K/UL (ref 0–0.04)
IMM GRANULOCYTES NFR BLD AUTO: 0.3 % (ref 0–0.5)
INR PPP: 1.1 (ref 0.8–1.2)
INTERVENTRICULAR SEPTUM: 0.9 CM (ref 0.6–1.1)
KETONES UR QL STRIP: ABNORMAL
LA MAJOR: 5.9 CM
LA MINOR: 4.6 CM
LA WIDTH: 3.2 CM
LACTATE SERPL-SCNC: 1.1 MMOL/L (ref 0.5–2.2)
LEFT ATRIUM SIZE: 3.7 CM
LEFT ATRIUM VOLUME INDEX: 24.2 ML/M2
LEFT ATRIUM VOLUME: 52.03 CM3
LEFT INTERNAL DIMENSION IN SYSTOLE: 2.1 CM (ref 2.1–4)
LEFT VENTRICLE DIASTOLIC VOLUME INDEX: 63.01 ML/M2
LEFT VENTRICLE DIASTOLIC VOLUME: 135.48 ML
LEFT VENTRICLE MASS INDEX: 76 G/M2
LEFT VENTRICLE SYSTOLIC VOLUME INDEX: 35.1 ML/M2
LEFT VENTRICLE SYSTOLIC VOLUME: 75.37 ML
LEFT VENTRICULAR INTERNAL DIMENSION IN DIASTOLE: 4.9 CM (ref 3.5–6)
LEFT VENTRICULAR MASS: 164.32 G
LEUKOCYTE ESTERASE UR QL STRIP: NEGATIVE
LYMPHOCYTES # BLD AUTO: 2.1 K/UL (ref 1–4.8)
LYMPHOCYTES NFR BLD: 14.2 % (ref 18–48)
MAGNESIUM SERPL-MCNC: 2.1 MG/DL (ref 1.6–2.6)
MCH RBC QN AUTO: 28.1 PG (ref 27–31)
MCHC RBC AUTO-ENTMCNC: 32.4 G/DL (ref 32–36)
MCV RBC AUTO: 87 FL (ref 82–98)
MICROSCOPIC COMMENT: ABNORMAL
MONOCYTES # BLD AUTO: 1.4 K/UL (ref 0.3–1)
MONOCYTES NFR BLD: 9.8 % (ref 4–15)
NEUTROPHILS # BLD AUTO: 11.1 K/UL (ref 1.8–7.7)
NEUTROPHILS NFR BLD: 75.5 % (ref 38–73)
NITRITE UR QL STRIP: NEGATIVE
NRBC BLD-RTO: 0 /100 WBC
PH UR STRIP: 6 [PH] (ref 5–8)
PHOSPHATE SERPL-MCNC: 2.9 MG/DL (ref 2.7–4.5)
PLATELET # BLD AUTO: 237 K/UL (ref 150–450)
PMV BLD AUTO: 9.9 FL (ref 9.2–12.9)
POCT GLUCOSE: 148 MG/DL (ref 70–110)
POCT GLUCOSE: 182 MG/DL (ref 70–110)
POCT GLUCOSE: 205 MG/DL (ref 70–110)
POCT GLUCOSE: 209 MG/DL (ref 70–110)
POCT GLUCOSE: 223 MG/DL (ref 70–110)
POCT GLUCOSE: 223 MG/DL (ref 70–110)
POCT GLUCOSE: 229 MG/DL (ref 70–110)
POCT GLUCOSE: 267 MG/DL (ref 70–110)
POTASSIUM SERPL-SCNC: 3.8 MMOL/L (ref 3.5–5.1)
POTASSIUM SERPL-SCNC: 3.9 MMOL/L (ref 3.5–5.1)
POTASSIUM SERPL-SCNC: 4.2 MMOL/L (ref 3.5–5.1)
POTASSIUM SERPL-SCNC: 4.2 MMOL/L (ref 3.5–5.1)
POTASSIUM SERPL-SCNC: 4.5 MMOL/L (ref 3.5–5.1)
POTASSIUM SERPL-SCNC: 4.7 MMOL/L (ref 3.5–5.1)
PROT UR QL STRIP: ABNORMAL
PROTHROMBIN TIME: 11.3 SEC (ref 9–12.5)
RA MAJOR: 4.4 CM
RA WIDTH: 4 CM
RBC # BLD AUTO: 4.49 M/UL (ref 4.6–6.2)
RBC #/AREA URNS HPF: 8 /HPF (ref 0–4)
RIGHT VENTRICULAR END-DIASTOLIC DIMENSION: 2.5 CM
RIGHT VENTRICULAR LENGTH IN DIASTOLE (APICAL 4-CHAMBER VIEW): 6 CM
SODIUM SERPL-SCNC: 139 MMOL/L (ref 136–145)
SODIUM SERPL-SCNC: 139 MMOL/L (ref 136–145)
SODIUM SERPL-SCNC: 142 MMOL/L (ref 136–145)
SODIUM SERPL-SCNC: 142 MMOL/L (ref 136–145)
SODIUM SERPL-SCNC: 143 MMOL/L (ref 136–145)
SODIUM SERPL-SCNC: 143 MMOL/L (ref 136–145)
SP GR UR STRIP: 1.02 (ref 1–1.03)
TRICUSPID ANNULAR PLANE SYSTOLIC EXCURSION: 1.6 CM
TROPONIN I SERPL DL<=0.01 NG/ML-MCNC: 12.12 NG/ML (ref 0–0.03)
URN SPEC COLLECT METH UR: ABNORMAL
UROBILINOGEN UR STRIP-ACNC: NEGATIVE EU/DL
WBC # BLD AUTO: 14.69 K/UL (ref 3.9–12.7)
WBC #/AREA URNS HPF: 7 /HPF (ref 0–5)

## 2022-01-13 PROCEDURE — 84100 ASSAY OF PHOSPHORUS: CPT | Performed by: NURSE PRACTITIONER

## 2022-01-13 PROCEDURE — 63600175 PHARM REV CODE 636 W HCPCS: Performed by: NURSE PRACTITIONER

## 2022-01-13 PROCEDURE — 92523 SPEECH SOUND LANG COMPREHEN: CPT

## 2022-01-13 PROCEDURE — 20000000 HC ICU ROOM

## 2022-01-13 PROCEDURE — 80048 BASIC METABOLIC PNL TOTAL CA: CPT | Mod: 91 | Performed by: NURSE PRACTITIONER

## 2022-01-13 PROCEDURE — 99233 PR SUBSEQUENT HOSPITAL CARE,LEVL III: ICD-10-PCS | Mod: ,,, | Performed by: INTERNAL MEDICINE

## 2022-01-13 PROCEDURE — 25000003 PHARM REV CODE 250: Performed by: INTERNAL MEDICINE

## 2022-01-13 PROCEDURE — 93010 EKG 12-LEAD: ICD-10-PCS | Mod: ,,, | Performed by: INTERNAL MEDICINE

## 2022-01-13 PROCEDURE — 81000 URINALYSIS NONAUTO W/SCOPE: CPT | Performed by: NURSE PRACTITIONER

## 2022-01-13 PROCEDURE — 97165 OT EVAL LOW COMPLEX 30 MIN: CPT

## 2022-01-13 PROCEDURE — 83735 ASSAY OF MAGNESIUM: CPT | Performed by: NURSE PRACTITIONER

## 2022-01-13 PROCEDURE — 92610 EVALUATE SWALLOWING FUNCTION: CPT

## 2022-01-13 PROCEDURE — 94761 N-INVAS EAR/PLS OXIMETRY MLT: CPT

## 2022-01-13 PROCEDURE — 84484 ASSAY OF TROPONIN QUANT: CPT | Performed by: NURSE PRACTITIONER

## 2022-01-13 PROCEDURE — G0427 INPT/ED TELECONSULT70: HCPCS | Mod: 95,,, | Performed by: NURSE PRACTITIONER

## 2022-01-13 PROCEDURE — 85610 PROTHROMBIN TIME: CPT | Performed by: NURSE PRACTITIONER

## 2022-01-13 PROCEDURE — 83605 ASSAY OF LACTIC ACID: CPT | Performed by: NURSE PRACTITIONER

## 2022-01-13 PROCEDURE — 85025 COMPLETE CBC W/AUTO DIFF WBC: CPT | Performed by: NURSE PRACTITIONER

## 2022-01-13 PROCEDURE — 93010 ELECTROCARDIOGRAM REPORT: CPT | Mod: ,,, | Performed by: INTERNAL MEDICINE

## 2022-01-13 PROCEDURE — 36415 COLL VENOUS BLD VENIPUNCTURE: CPT | Performed by: NURSE PRACTITIONER

## 2022-01-13 PROCEDURE — 93005 ELECTROCARDIOGRAM TRACING: CPT

## 2022-01-13 PROCEDURE — 25000003 PHARM REV CODE 250: Performed by: NURSE PRACTITIONER

## 2022-01-13 PROCEDURE — C9399 UNCLASSIFIED DRUGS OR BIOLOG: HCPCS | Performed by: INTERNAL MEDICINE

## 2022-01-13 PROCEDURE — 99223 1ST HOSP IP/OBS HIGH 75: CPT | Mod: ,,, | Performed by: NURSE PRACTITIONER

## 2022-01-13 PROCEDURE — 99223 PR INITIAL HOSPITAL CARE,LEVL III: ICD-10-PCS | Mod: ,,, | Performed by: NURSE PRACTITIONER

## 2022-01-13 PROCEDURE — 97116 GAIT TRAINING THERAPY: CPT

## 2022-01-13 PROCEDURE — G0427 PR INPT TELEHEALTH CON 70/>M: ICD-10-PCS | Mod: 95,,, | Performed by: NURSE PRACTITIONER

## 2022-01-13 PROCEDURE — 85730 THROMBOPLASTIN TIME PARTIAL: CPT | Performed by: NURSE PRACTITIONER

## 2022-01-13 PROCEDURE — 82553 CREATINE MB FRACTION: CPT | Performed by: NURSE PRACTITIONER

## 2022-01-13 PROCEDURE — 99233 SBSQ HOSP IP/OBS HIGH 50: CPT | Mod: ,,, | Performed by: INTERNAL MEDICINE

## 2022-01-13 PROCEDURE — 97161 PT EVAL LOW COMPLEX 20 MIN: CPT

## 2022-01-13 RX ORDER — IBUPROFEN 200 MG
16 TABLET ORAL
Status: DISCONTINUED | OUTPATIENT
Start: 2022-01-13 | End: 2022-01-14 | Stop reason: HOSPADM

## 2022-01-13 RX ORDER — CLOPIDOGREL BISULFATE 75 MG/1
300 TABLET ORAL ONCE
Status: COMPLETED | OUTPATIENT
Start: 2022-01-13 | End: 2022-01-13

## 2022-01-13 RX ORDER — CLOPIDOGREL BISULFATE 75 MG/1
75 TABLET ORAL DAILY
Status: DISCONTINUED | OUTPATIENT
Start: 2022-01-14 | End: 2022-01-14 | Stop reason: HOSPADM

## 2022-01-13 RX ORDER — GLUCAGON 1 MG
1 KIT INJECTION
Status: DISCONTINUED | OUTPATIENT
Start: 2022-01-13 | End: 2022-01-14 | Stop reason: HOSPADM

## 2022-01-13 RX ORDER — INSULIN ASPART 100 [IU]/ML
0-5 INJECTION, SOLUTION INTRAVENOUS; SUBCUTANEOUS
Status: DISCONTINUED | OUTPATIENT
Start: 2022-01-13 | End: 2022-01-14 | Stop reason: HOSPADM

## 2022-01-13 RX ORDER — GABAPENTIN 300 MG/1
300 CAPSULE ORAL NIGHTLY
Status: DISCONTINUED | OUTPATIENT
Start: 2022-01-14 | End: 2022-01-13

## 2022-01-13 RX ORDER — TRAMADOL HYDROCHLORIDE 50 MG/1
50 TABLET ORAL ONCE
Status: COMPLETED | OUTPATIENT
Start: 2022-01-13 | End: 2022-01-13

## 2022-01-13 RX ORDER — IBUPROFEN 200 MG
24 TABLET ORAL
Status: DISCONTINUED | OUTPATIENT
Start: 2022-01-13 | End: 2022-01-14 | Stop reason: HOSPADM

## 2022-01-13 RX ORDER — GABAPENTIN 300 MG/1
300 CAPSULE ORAL NIGHTLY
Status: DISCONTINUED | OUTPATIENT
Start: 2022-01-13 | End: 2022-01-14 | Stop reason: HOSPADM

## 2022-01-13 RX ADMIN — SODIUM CHLORIDE, SODIUM LACTATE, POTASSIUM CHLORIDE, AND CALCIUM CHLORIDE: .6; .31; .03; .02 INJECTION, SOLUTION INTRAVENOUS at 01:01

## 2022-01-13 RX ADMIN — METOPROLOL SUCCINATE 25 MG: 25 TABLET, EXTENDED RELEASE ORAL at 08:01

## 2022-01-13 RX ADMIN — CLOPIDOGREL 300 MG: 75 TABLET, FILM COATED ORAL at 02:01

## 2022-01-13 RX ADMIN — INSULIN ASPART 2 UNITS: 100 INJECTION, SOLUTION INTRAVENOUS; SUBCUTANEOUS at 05:01

## 2022-01-13 RX ADMIN — TRAMADOL HYDROCHLORIDE 50 MG: 50 TABLET, FILM COATED ORAL at 11:01

## 2022-01-13 RX ADMIN — INSULIN ASPART 1 UNITS: 100 INJECTION, SOLUTION INTRAVENOUS; SUBCUTANEOUS at 09:01

## 2022-01-13 RX ADMIN — ATORVASTATIN CALCIUM 40 MG: 40 TABLET, FILM COATED ORAL at 08:01

## 2022-01-13 RX ADMIN — GABAPENTIN 300 MG: 300 CAPSULE ORAL at 11:01

## 2022-01-13 RX ADMIN — ENOXAPARIN SODIUM 40 MG: 100 INJECTION SUBCUTANEOUS at 05:01

## 2022-01-13 RX ADMIN — ASPIRIN 81 MG: 81 TABLET, COATED ORAL at 08:01

## 2022-01-13 RX ADMIN — MUPIROCIN: 20 OINTMENT TOPICAL at 08:01

## 2022-01-13 RX ADMIN — PANTOPRAZOLE SODIUM 40 MG: 40 TABLET, DELAYED RELEASE ORAL at 08:01

## 2022-01-13 RX ADMIN — INSULIN DETEMIR 21 UNITS: 100 INJECTION, SOLUTION SUBCUTANEOUS at 09:01

## 2022-01-13 RX ADMIN — SODIUM CHLORIDE, SODIUM LACTATE, POTASSIUM CHLORIDE, AND CALCIUM CHLORIDE: .6; .31; .03; .02 INJECTION, SOLUTION INTRAVENOUS at 03:01

## 2022-01-13 RX ADMIN — INSULIN ASPART 3 UNITS: 100 INJECTION, SOLUTION INTRAVENOUS; SUBCUTANEOUS at 04:01

## 2022-01-13 RX ADMIN — INSULIN ASPART 2 UNITS: 100 INJECTION, SOLUTION INTRAVENOUS; SUBCUTANEOUS at 08:01

## 2022-01-13 RX ADMIN — INSULIN DETEMIR 21 UNITS: 100 INJECTION, SOLUTION SUBCUTANEOUS at 05:01

## 2022-01-13 RX ADMIN — TICAGRELOR 90 MG: 90 TABLET ORAL at 08:01

## 2022-01-13 RX ADMIN — MUPIROCIN: 20 OINTMENT TOPICAL at 09:01

## 2022-01-13 NOTE — PT/OT/SLP EVAL
"Occupational Therapy   Evaluation and Discharge Note    Name: Kamar Muñoz  MRN: [id number]  Admitting Diagnosis:  Cerebrovascular accident (CVA)   Recent Surgery: * No surgery found *      Recommendations:     Discharge Recommendations: home,outpatient speech therapy  Discharge Equipment Recommendations:  none  Barriers to discharge:  None    Assessment:     Kamar Muñoz is a 76 y.o. male with a medical diagnosis of Cerebrovascular accident (CVA). At this time, patient is functioning at their prior level of function and does not require further acute OT services.     Plan:     During this hospitalization, patient does not require further acute OT services.  Please re-consult if situation changes.    · Plan of Care Reviewed with: patient    Subjective     Chief Complaint: "I feel okay."  Patient/Family Comments/goals: To return home    Occupational Profile:  Living Environment: Pt lives with wife in [facility] with walk-in shower and built in shower chair. Pt also has grab bars in shower and a raised toilet.  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  Patient found supine with blood pressure cuff,lopez catheter,pulse ox (continuous),telemetry,peripheral IV upon OT entry to room.    General Precautions: Standard, fall   Orthopedic Precautions:N/A   Braces: N/A  Respiratory Status: Room air     Occupational Performance:    Bed Mobility:    · Patient completed Rolling/Turning to Right with supervision  · Patient completed Scooting/Bridging with modified independence  · Patient completed Supine to Sit with supervision     Functional Mobility/Transfers:  · Patient completed Sit <> " "Stand Transfer with contact guard assistance  with  no assistive device   · Patient completed Bed <> Chair Transfer using Step Transfer technique with CGA-SBA with no assistive device  · Functional Mobility: Pt ambulated around in room with CGA-SBA and no AD; pt with shuffling gait and stated, "I shuffled when I was at home."    Activities of Daily Living:  · Feeding:  set up assist    · Lower Body Dressing: set up assist to don socks while seated EOB      Cognitive/Visual Perceptual:  Cognitive/Psychosocial Skills:     -       Oriented to: Person, Place, Time and Situation   -       Follows Commands/attention:Follows multistep  commands  -       Memory: No Deficits noted  -       Safety awareness/insight to disability: intact     Physical Exam:  Sensation:    -       Intact; pt with existing neuropathy in left hand/leg  Dominant hand:    -       Right  Upper Extremity Range of Motion:     -       Right Upper Extremity: WFL  -       Left Upper Extremity: WFL  Upper Extremity Strength:    -       Right Upper Extremity: 4-/5  -       Left Upper Extremity: 4-/5   Strength:    -       Right Upper Extremity: WFL  -       Left Upper Extremity: WFL  Fine Motor Coordination:    -       Intact    AMPAC 6 Click ADL:  AMPAC Total Score: 23    Treatment & Education:  Role of OT; POC; D/C planning  Education:    Patient left up in chair with all lines intact, call button in reach and nursing notified    GOALS:   Multidisciplinary Problems     Occupational Therapy Goals        Problem: Occupational Therapy Goal    Goal Priority Disciplines Outcome Interventions   Occupational Therapy Goal     OT, PT/OT Adequate for Care Transition                    History:     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 8/2/2012    Type II or unspecified type diabetes " mellitus with neurological manifestations, uncontrolled(250.62) 3/8/2013    Urinary tract infection        Past Surgical History:   Procedure Laterality Date    BACK SURGERY      CATARACT EXTRACTION W/  INTRAOCULAR LENS IMPLANT Right     Per Dr Romero note 11/2018    COLONOSCOPY N/A 1/28/2019    Procedure: COLONOSCOPY Suprep;  Surgeon: Anh Johnson MD;  Location: [facility] ENDO;  Service: Endoscopy;  Laterality: N/A;    EYE SURGERY      HERNIA REPAIR      LEFT HEART CATHETERIZATION Left 1/9/2022    Procedure: CATHETERIZATION, HEART, LEFT;  Surgeon: Will Hurst III, MD;  Location: [facility] CATH LAB/EP;  Service: Cardiology;  Laterality: Left;    renal stones      SHOULDER OPEN ROTATOR CUFF REPAIR         Time Tracking:     OT Date of Treatment: 01/13/22  OT Start Time: 0926  OT Stop Time: 0943  OT Total Time (min): 17 min    Billable Minutes:Evaluation 17    1/13/2022

## 2022-01-13 NOTE — CONSULTS
Food & Nutrition  Education    Diet Education: Stroke Pathway-Cardiac Diet, Carbohydrate Controlled Diet Education  Time Spent: 15 minutes  Learners: Patient       Nutrition Education provided with handouts: Heart Healthy Consistent CHO Nutrition Therapy, Using Nutrition Labels: CHO, Diabetes Label Reading Tips, Choose Your Foods List- Non-Starchy Vegetables      Comments: Pt receiving diabetic diet with minimal intake. Reports he is not feeling very hungry, denies N/V/D/C. Reports he follows a low salt, diabetic diet at home and mainly consumes sugar free sweets if he has them. Discussed salt free seasonings, pt able to recall which salt free seasonings he uses at home. Discussed convenience food items and reading the nutrition label. Spoke about portion control and medication compliance. Also discussed increased intake of non-starchy vegetables and whole grains. Pt expressed understanding of all topics discussed.     Glu 225  A1C 12.3      All questions and concerns answered. Dietitian's contact information provided.       Follow-Up: 1x/week     Please Re-consult as needed        Thanks!

## 2022-01-13 NOTE — SUBJECTIVE & OBJECTIVE
Interval History:  awake and alert, no new complaint  Awaiting images and TTE.  Appreciate Neurology recs  Step-down to the floor    Review of Systems   Constitutional: Negative for chills and fever.   HENT: Negative for congestion.    Eyes: Negative for visual disturbance.   Respiratory: Negative for cough and shortness of breath.    Cardiovascular: Negative for chest pain.   Gastrointestinal: Negative for abdominal pain and nausea.   Genitourinary: Negative for difficulty urinating and dysuria.   Musculoskeletal: Negative for arthralgias and myalgias.   Skin: Positive for wound (right arm abrasion).   Neurological: Positive for weakness. Negative for dizziness, speech difficulty, numbness and headaches.   Psychiatric/Behavioral: Negative for confusion.     Objective:     Vital Signs (Most Recent):  Temp: 97.5 °F (36.4 °C) (01/13/22 0730)  Pulse: 105 (01/13/22 0800)  Resp: 20 (01/13/22 0800)  BP: (!) 165/69 (01/13/22 0800)  SpO2: (!) 90 % (01/13/22 0800) Vital Signs (24h Range):  Temp:  [97.5 °F (36.4 °C)-100.9 °F (38.3 °C)] 97.5 °F (36.4 °C)  Pulse:  [] 105  Resp:  [18-32] 20  SpO2:  [90 %-96 %] 90 %  BP: (110-165)/(59-85) 165/69     Weight: 90.5 kg (199 lb 8.3 oz)  Body mass index is 26.32 kg/m².    Intake/Output Summary (Last 24 hours) at 1/13/2022 1123  Last data filed at 1/13/2022 0621  Gross per 24 hour   Intake 3048.09 ml   Output 950 ml   Net 2098.09 ml      Physical Exam  Vitals and nursing note reviewed.   Constitutional:       General: He is not in acute distress.     Appearance: Normal appearance. He is ill-appearing (chronic).   HENT:      Head: Normocephalic and atraumatic.   Cardiovascular:      Rate and Rhythm: Normal rate. Rhythm irregular.      Pulses: Normal pulses.      Heart sounds: Normal heart sounds.   Pulmonary:      Effort: Pulmonary effort is normal.      Comments: Mild tachypnea   Abdominal:      General: Bowel sounds are normal.      Palpations: Abdomen is soft.   Musculoskeletal:          General: No swelling. Normal range of motion.      Cervical back: Normal range of motion.   Skin:     General: Skin is warm and dry.      Comments: Abrasion to right arm   Neurological:      Mental Status: He is alert and oriented to person, place, and time.      Motor: Weakness present.      Comments:  mild dysarthria, good strength to all extremities, normal mentation on exam   Psychiatric:         Behavior: Behavior normal.         Thought Content: Thought content normal.         Significant Labs:   A1C:   Recent Labs   Lab 08/26/21  0855 12/29/21  0810   HGBA1C 10.9* 12.3*     ABGs:   Recent Labs   Lab 01/12/22  1407   PH 7.295*   PCO2 33.6*   HCO3 16.3*   POCSATURATED 57*   BE -10   PO2 33*     Blood Culture: No results for input(s): LABBLOO in the last 48 hours.  CBC:   Recent Labs   Lab 01/12/22  1057 01/12/22  1407 01/13/22  0359   WBC 10.72  --  14.69*   HGB 13.6*  --  12.6*   HCT 41.3 40 38.9*     --  237     CMP:   Recent Labs   Lab 01/12/22  1057 01/12/22  1357 01/12/22  2316 01/13/22  0358 01/13/22  0815      < > 144 142  142 143   K 5.4*   < > 3.9 4.7  4.5 4.2      < > 113* 110  109 110   CO2 16*   < > 21* 18*  20* 19*   *   < > 110 250*  250* 225*   BUN 48*   < > 42* 41*  41* 38*   CREATININE 1.7*   < > 1.4 1.4  1.5* 1.4   CALCIUM 8.9   < > 8.2* 8.0*  8.1* 8.1*   PROT 7.2  --   --   --   --    ALBUMIN 3.5  --   --   --   --    BILITOT 1.7*  --   --   --   --    ALKPHOS 107  --   --   --   --    AST 28  --   --   --   --    ALT 29  --   --   --   --    ANIONGAP 21*   < > 10 14  13 14   EGFRNONAA 38*   < > 48* 48*  44* 48*    < > = values in this interval not displayed.     Lactic Acid:   Recent Labs   Lab 01/12/22  1938 01/12/22  2316 01/13/22  0358   LACTATE 3.9* 1.4 1.1     Lipase: No results for input(s): LIPASE in the last 48 hours.  Lipid Panel: No results for input(s): CHOL, HDL, LDLCALC, TRIG, CHOLHDL in the last 48 hours.  Magnesium:   Recent Labs    Lab 01/13/22  0358   MG 2.1     Troponin:   Recent Labs   Lab 01/13/22  0358   TROPONINI 12.121*     TSH:   Recent Labs   Lab 01/12/22  1057   TSH 0.509     Urine Culture: No results for input(s): LABURIN in the last 48 hours.  Urine Studies:   Recent Labs   Lab 01/13/22  0607   COLORU Yellow   APPEARANCEUA Clear   PHUR 6.0   SPECGRAV 1.025   PROTEINUA 1+*   GLUCUA 2+*   KETONESU 2+*   BILIRUBINUA Negative   OCCULTUA 1+*   NITRITE Negative   UROBILINOGEN Negative   LEUKOCYTESUR Negative   RBCUA 8*   WBCUA 7*   BACTERIA Rare   HYALINECASTS 0       Significant Imaging: I have reviewed all pertinent imaging results/findings within the past 24 hours.

## 2022-01-13 NOTE — PROGRESS NOTES
Updated LUIS Obando regarding pt's BMP results. Continue with fluids LR @125cc/hr and d10 @15cc/hr, until pt is able to eat. Last bs 99 and will continue to check q1h.

## 2022-01-13 NOTE — PLAN OF CARE
Pt is AAOx4. Forgetful at times, but easily redirectable. Noticeable left side dropping on his face, but otherwise no deficits noted. Gap closed and insulin gtt d/c'ed. Mccord catheter in place for urinary retention. Safety precautions maintained. Tolerated all medications well.

## 2022-01-13 NOTE — SUBJECTIVE & OBJECTIVE
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 8/2/2012    Type II or unspecified type diabetes mellitus with neurological manifestations, uncontrolled(250.62) 3/8/2013    Urinary tract infection        Past Surgical History:   Procedure Laterality Date    BACK SURGERY      CATARACT EXTRACTION W/  INTRAOCULAR LENS IMPLANT Right     Per Dr Romero note 11/2018    COLONOSCOPY N/A 1/28/2019    Procedure: COLONOSCOPY Suprep;  Surgeon: Anh Johnson MD;  Location: [facility] ENDO;  Service: Endoscopy;  Laterality: N/A;    EYE SURGERY      HERNIA REPAIR      LEFT HEART CATHETERIZATION Left 1/9/2022    Procedure: CATHETERIZATION, HEART, LEFT;  Surgeon: Will Hurst III, MD;  Location: [facility] CATH LAB/EP;  Service: Cardiology;  Laterality: Left;    renal stones      SHOULDER OPEN ROTATOR CUFF REPAIR         Review of patient's allergies indicates:   Allergen Reactions    Iodine      Other reaction(s): swelling  Other reaction(s): Itching  Other reaction(s): Rash       No current facility-administered medications on file prior to encounter.     Current Outpatient Medications on File Prior to Encounter   Medication Sig    aspirin (ECOTRIN) 81 MG EC tablet Take 81 mg by mouth once daily.    atorvastatin (LIPITOR) 40 MG tablet Take 1 tablet (40 mg total) by mouth once daily.    diltiaZEM (CARDIZEM CD) 120 MG Cp24 Take 1 capsule (120 mg total) by mouth once daily.    gabapentin (NEURONTIN) 300 MG capsule Take 1 capsule (300 mg total) by mouth 2 (two) times daily. (Patient taking differently: Take 300 mg by mouth 2 (two) times daily. Takes nightly)    insulin aspart U-100 (NOVOLOG FLEXPEN U-100 INSULIN) 100 unit/mL (3 mL) InPn pen Inject 17 Units into the skin 3 (three) times daily with meals.    insulin glargine (LANTUS) 100 unit/mL injection Inject  "28 Units into the skin every evening.    losartan (COZAAR) 25 MG tablet Take 1 tablet (25 mg total) by mouth once daily.    metFORMIN (GLUCOPHAGE) 500 MG tablet Take 1 tablet (500 mg total) by mouth 2 (two) times daily with meals.    metoprolol succinate (TOPROL-XL) 25 MG 24 hr tablet Take 1 tablet (25 mg total) by mouth once daily.    OMEPRAZOLE (PRILOSEC ORAL) Take 1 tablet by mouth daily as needed.     BD ULTRA-FINE SHORT PEN NEEDLE 31 gauge x 5/16" Ndle 3 (three) times daily.    blood sugar diagnostic Strp 1 strip by Misc.(Non-Drug; Combo Route) route 2 (two) times a day.    diclofenac sodium (VOLTAREN) 1 % Gel APPLY 2 GRAMS TO AFFECTED AREA ONCE D    ergocalciferol (ERGOCALCIFEROL) 50,000 unit Cap     glucagon, human recombinant, (GLUCAGON EMERGENCY KIT, HUMAN,) 1 mg SolR Inject 1 mg into the muscle as needed.    hydrocortisone 2.5 % cream APPLY TO GROIN RASH BID NO LONGER THAN 7 DAYS    ketoconazole (NIZORAL) 2 % cream APPLY TO GROIN RASH BID NO LONGER THAN 7 DAYS    lancets (ONETOUCH ULTRASOFT LANCETS) Misc 1 lancet by Misc.(Non-Drug; Combo Route) route 2 (two) times a day.    MULTIVITAMIN ORAL Take 1 tablet by mouth once daily.     nitroGLYCERIN (NITROSTAT) 0.4 MG SL tablet Place 1 tablet (0.4 mg total) under the tongue every 5 (five) minutes as needed for Chest pain.    pen needle, diabetic (PEN NEEDLE) 30 gauge x 5/16" Ndle 1 Units by Misc.(Non-Drug; Combo Route) route 3 (three) times daily.    polycarbophil (FIBERCON) 625 mg tablet Take 1 tablet by mouth once daily.     ticagrelor (BRILINTA) 90 mg tablet Take 1 tablet (90 mg total) by mouth 2 (two) times a day.     Family History    None       Tobacco Use    Smoking status: Former Smoker     Packs/day: 1.50     Years: 25.00     Pack years: 37.50     Quit date: 1983     Years since quittin.0    Smokeless tobacco: Never Used   Substance and Sexual Activity    Alcohol use: No    Drug use: No    Sexual activity: Yes     " Partners: Female     Review of Systems   Constitutional: Negative for chills and fever.   HENT: Negative for congestion.    Eyes: Negative for visual disturbance.   Respiratory: Negative for cough and shortness of breath.    Cardiovascular: Negative for chest pain.   Gastrointestinal: Negative for abdominal pain and nausea.   Genitourinary: Negative for difficulty urinating and dysuria.   Musculoskeletal: Negative for arthralgias and myalgias.   Skin: Positive for wound (right arm abrasion).   Neurological: Positive for weakness. Negative for dizziness, speech difficulty, numbness and headaches.   Psychiatric/Behavioral: Negative for confusion.     Objective:     Vital Signs (Most Recent):  Temp: 98.3 °F (36.8 °C) (01/12/22 2141)  Pulse: 85 (01/12/22 2141)  Resp: 18 (01/12/22 2141)  BP: (!) 131/59 (01/12/22 2135)  SpO2: 96 % (01/12/22 2141) Vital Signs (24h Range):  Temp:  [97.5 °F (36.4 °C)-100.9 °F (38.3 °C)] 98.3 °F (36.8 °C)  Pulse:  [] 85  Resp:  [18-32] 18  SpO2:  [91 %-96 %] 96 %  BP: (129-154)/(59-76) 131/59        There is no height or weight on file to calculate BMI.    Physical Exam  Vitals and nursing note reviewed.   Constitutional:       General: He is not in acute distress.     Appearance: Normal appearance. He is ill-appearing (chronic).   HENT:      Head: Normocephalic and atraumatic.      Nose: Nose normal.      Mouth/Throat:      Mouth: Mucous membranes are moist.   Eyes:      Pupils: Pupils are equal, round, and reactive to light.   Cardiovascular:      Rate and Rhythm: Normal rate. Rhythm irregular.      Pulses: Normal pulses.      Heart sounds: Normal heart sounds.   Pulmonary:      Effort: Pulmonary effort is normal.      Comments: Mild tachypnea   Abdominal:      General: Bowel sounds are normal.      Palpations: Abdomen is soft.   Musculoskeletal:         General: No swelling. Normal range of motion.      Cervical back: Normal range of motion.   Skin:     General: Skin is warm and dry.       Comments: Abrasion to right arm   Neurological:      Mental Status: He is alert and oriented to person, place, and time.      Motor: Weakness present.      Comments: Left facial droop, mild dysarthria, good strength to all extremities, normal mentation on exam   Psychiatric:         Behavior: Behavior normal.         Thought Content: Thought content normal.           CRANIAL NERVES     CN III, IV, VI   Pupils are equal, round, and reactive to light.       Significant Labs: All pertinent labs within the past 24 hours have been reviewed.    Significant Imaging: I have reviewed all pertinent imaging results/findings within the past 24 hours.

## 2022-01-13 NOTE — HPI
77yo male with CAD s/p RCA LAUREN with recent STEMI, DMII- uncontrolled, HTN, afib- new onset, BRENDAN and DKA who presented to the ER with slurred speech. Mr. Jean was discharged on 1/11/2022 following admission for STEMI. His family noted left facial droop and slurred speech. He was hesitant to return to the hospital however his family finally convinced him. He was also found to be in DKA upon arrival. He is prescribed insulin at home but cannot recall if he took his routine dose. Upon arrival in the ER, his EKG demonstrated afib (new finding). He underwent CT head with demonstration of subacute embolic stroke. He was admitted to [facility] Medicine for further evaluation with MRI/MRA pending. Cardiology consulted for afib and CAD management

## 2022-01-13 NOTE — PT/OT/SLP EVAL
Speech Language Pathology Evaluation  Cognitive/Bedside Swallow    Patient Name:  Kamar Muñoz   MRN:  [id number]  Admitting Diagnosis: Cerebrovascular accident (CVA)    Recommendations:                  General Recommendations: Further assess cognition: visual, reading and writing skills Cognitive-linguistic therapy and Cognitive-linguistic evaluation   SLP will monitor with reg diet tray x1   Diet recommendations:  Regular, Thin   Aspiration Precautions: standard precautions, upright for meals, R handed, can feed self, whole meds, straws ok, slow rate and alternate sips and bites during meals.    General Precautions: Standard, fall,vision impaired ([place], uses eyeglasses)  Communication strategies:  Pt is verbal and can attend to SLP, he follows commands but needs repetition due to dcr'd hearing acuity    H&P per admitting provider:    Principal Problem:Cerebrovascular accident (CVA)       Chief Complaint   Patient presents with    Facial Droop       Pt and family noticed a facial droop yesterday around 1030 am. Pt had cardiac stents placed x2 days ago and is on blood thinners. Left, facial droop and slurred speech noted; no visual disturbances  or weakness noted. Aox4.          HPI: Kamar Muñoz is a 77 yo male with a pmh of HTN, STEMI with stent placement on 1/9/22, DM2. He presented today with stroke symptoms x 1 day. He was noted to have a left  facial droop and slurred speech at home since yesterday morning after [facility]. He resisted returning to the hospital yesterday. The patient states he had not started taking the brilinta yet because he did not know how it would interact with his other medications. He says he fell last night by tripping on a rug and hit his right arm and leg, denies hitting head. He denies any symptoms and states he cannot hear that he has slurred speech. He was also found to be in DKA on arrival. He does not recall if he took  his insulin at home last night. He was evaluated by neurovascular provider after head CT showed subacute embolic strokes. EKG with Afib. She recommended continued DAPT, MRI/MRA brain imaging. Given a liter of IV fluids and started on insulin drip per ED provider.            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 8/2/2012    Type II or unspecified type diabetes mellitus with neurological manifestations, uncontrolled(250.62) 3/8/2013    Urinary tract infection        Past Surgical History:   Procedure Laterality Date    BACK SURGERY      CATARACT EXTRACTION W/  INTRAOCULAR LENS IMPLANT Right     Per Dr Romero note 11/2018    COLONOSCOPY N/A 1/28/2019    Procedure: COLONOSCOPY Suprep;  Surgeon: Anh Johnson MD;  Location: [facility] ENDO;  Service: Endoscopy;  Laterality: N/A;    EYE SURGERY      HERNIA REPAIR      LEFT HEART CATHETERIZATION Left 1/9/2022    Procedure: CATHETERIZATION, HEART, LEFT;  Surgeon: Will Hurst III, MD;  Location: [facility] CATH LAB/EP;  Service: Cardiology;  Laterality: Left;    renal stones      SHOULDER OPEN ROTATOR CUFF REPAIR         Social History: Patient lives with wife in [place] [facility], , 4 adult kids    Prior Intubation HX:  n/a    Modified Barium Swallow: none per recent EMR or admit     CT: 1. Acute or subacute infarctions within the right frontal lobe and the left cerebellar hemisphere.   Consider further evaluation with MRI as clinically indicated.   Chest X-Rays: The lungs are otherwise clear.  No focal consolidation.  The pleural spaces are clear.     Prior diet: diabetic; unrestricted diet and thin liquids    Occupation/hobbies/homemaking: retired from [employer] in 8/2021, takes care of wife who is disabled- uses walker at home. He reports that she was recently dc'd from  "[facility]   ST orders received this date for eval and treat s/p CVA.  SLP did communicate with SOY Benson in ICU for ok for eval.  Pt had passed CHRISTIANNE assessment on admit to ER.   Pt found upright in bed, watching tv.     Patient goals: "I want to get out of this bed and walk to the bathroom, I do not like this tube."    Pain/Comfort:  · Pain Rating 1: 0/10    Respiratory Status: room air     Objective:     Oral Musculature Evaluation  Oral Musculature: facial asymmetry present,left weakness  Dentition: partials (upper partials in place, bottom is natural dentition)  Secretion Management: adequate  Mucosal Quality: good,adequate  Mandibular Strength and Mobility: WFL  Oral Labial Strength and Mobility: impaired coordination  Lingual Strength and Mobility: impaired strength  Velar Elevation:  (sluggish elevation)  Buccal Strength and Mobility: decreased tone  Volitional Cough: elicited  Volitional Swallow: timely swallow  Voice Prior to PO Intake: clear voice  Oral Musculature Comments: L facial droop is present upon pucker and smile, very minimal weakness noted at rest     COGNITIVE STATUS:  Behavioral Observations: alert and appropriate-  Memory: able to recall morning activity this am, confused when asked what the date and month were currently. Whiteboard not updated to assist pt. He relied on SLP cues for temp orientation.   Pt oriented to person, , age and biographical info: address, wife's name, kids names and recent penitentiary and job duties.   SLP will further assess high level memory/cognitive status next session     Attention: fair in quiet environment  Problem Solving: further assess   Insight Awareness: "reports that everything is fine with me"  Sequencing: further assess   Pragmatics: jokes appropriately, smiles and appropriate affect     LANGUAGE:     Receptive Language: follows commands and responds appropriately to simple and complex y/n?s  Able to ID objects and state function of " "object in room.     Expressive Language: fluent verbal output  Naming: 10/10 items in room  Automatic Speech: adequate   Repetition: fair needs repetition for complex/lengthy information, states sometimes he cannot hear and everyone at home tells him that he cannot hear but does NOT wear hearing aids at this time.   "I think my hearing is just fine"    Motor Speech: Mild dysarthric speech is noted at the conversational level c/b L oral motor weakness, fast rate of speech,tends to mumble in unknown contexts and able to self correct with verbal cues for repetition. Pt states "they told me that my speech was slurred but I do not hear it."    Voice: clear voice     Reading: TBA    Writing: R hand, wife handles finances    Visual-Spatial:TBA     Bedside Swallow Eval: RN reported no issues with meds and diet ordered per CHRISTIANNE assessment upon night admit   Consistencies Assessed:  · Thin liquids water by cup edgex3 , self fed water by straw x4  · Puree pudding self x3  · Soft solids diced peaches x2  · Solids rodrick cracker 1/2 piece self fed     Oral Phase:   · WFL   · Slow mastication with solids but efficient and age related     Pharyngeal Phase:   · no overt clinical signs/symptoms of aspiration  · no overt clinical signs/symptoms of pharyngeal dysphagia  · multiple spontaneous swallows   · No instances of coughing/choking and no change in voice across PO trials  · No change in SPO2 during session    Compensatory Strategies  · No talking while eating  · Slow rate  · Alternate sips and bites  · Encourage self feeding during meals     Treatment: Pt to be seen at least 3x/week while on acute unit to address CVA deficits and modify treatment goals as indicated.   SLP provided brief education to Mr. Barrientos on role of SLP, diet recommendations and goals of assessment. Discussed current w/u for CVA and that further visit and assessment would be done in next session. He verbalized confirmation of info. No family at bedside during " ST session, pt left in bed with TV on- Dr. Benitez walked into room and all results discussed with her re: treatment plan.      Assessment:     Kamar Muñoz is a 76 y.o. male admitted with R CVA and L cerebellar CVA with MRI pending with an SLP diagnosis of MILD Dysarthria and further Cognitive-communication assessment to be conducted next session to delineate further treatment goals and modify as indicated.     Goals:   Multidisciplinary Problems     SLP Goals        Problem: SLP Goal    Goal Priority Disciplines Outcome   SLP Goal     SLP Ongoing, Progressing   Description: Short Term Goals:  1. Patient will successfully participate in cyxixo-[id number]-ngxoadxeb evaluation to further assess for any communication impairments s/p stroke  ongoing 1/13  2. Pt will participate in ongoing swallow assessment to determine least restrictive diet. MET 1/13  3. Pt will tolerate regular tray/thin liquids with no audible s/s of dysphagia and good oral clearance x1 session  4. Further assess cognitive and writing skills next session to delineate high level cognitive goals.  5. Pt will complete simple oral motor ex to increase ROM and strength for speech production with min cues.  6. Pt will recall orientation info to year, date and time with use of external cues.  7. Educate pt and family on goals of SLP and deficits associated with R CVA in next session.                        Plan:     · Patient to be seen:  3 x/week   · Plan of Care expires:  02/11/22  · Plan of Care reviewed with:  patient   · SLP Follow-Up:  Yes       Discharge recommendations:  Discharge Facility/Level of Care Needs:  (TBD, likely need ST at DC)   Barriers to Discharge:  none    Time Tracking:     SLP Treatment Date:   01/13/22  Speech Start Time:  0852  Speech Stop Time:  0918     Speech Total Time (min):  26 min    Billable Minutes: Eval 16  and Eval Swallow and Oral Function 10    01/13/2022

## 2022-01-13 NOTE — PT/OT/SLP EVAL
Physical Therapy Evaluation and Treatment    Patient Name:  Kamar Muñoz   MRN:  [id number]    Recommendations:     Discharge Recommendations:  outpatient PT,outpatient speech therapy   Discharge Equipment Recommendations:  (likely none)   Barriers to Discharge: None    Assessment:     Kamar Muñoz is a 76 y.o. male admitted with a medical diagnosis of Cerebrovascular accident (CVA).  He presents with the following impairments/functional limitations:  weakness,gait instability,impaired balance,decreased lower extremity function. Noted pt with L facial droop and short term memory deficits and with L hip flexion with slight weakness, otherwise appears near baseline. Recommending OP PT and SLP upon d/c.    Rehab Prognosis: Good; patient would benefit from acute skilled PT services to address these deficits and reach maximum level of function.    Recent Surgery: * No surgery found *      Plan:     During this hospitalization, patient to be seen 2 x/week to address the identified rehab impairments via gait training,therapeutic activities,therapeutic exercises,neuromuscular re-education and progress toward the following goals:    · Plan of Care Expires:  02/13/22    Subjective     Chief Complaint: none  Patient/Family Comments/goals: pt is pleasant and agreeable to participate in therapy session, pt reports feeling near his baseline  Pain/Comfort:  · Pain Rating 1: 0/10  · Pain Rating Post-Intervention 1: 0/10    Patients cultural, spiritual, Bahai conflicts given the current situation: no    Living Environment:  Pt lives with his wife in a [facility] with threshold entrance and [facility] with built in seat.  Prior to admission, patients level of function was independent without AD for mobility and ADLs, drives, provides wife with PRN assist to stand and she uses a RW or SPC for ambulation.  Equipment used at home:  (built in shower seat).  DME owned (not currently used): built in shower seat.  Upon discharge, patient  will have assistance from his wife is home but unable to provide physical assist.    Objective:     Communicated with nurse Benson prior to session.  Patient found HOB elevated with blood pressure cuff,telemetry,peripheral IV,pulse ox (continuous),lopez catheter  upon PT entry to room.    General Precautions: Standard, fall   Orthopedic Precautions:N/A   Braces: N/A  Respiratory Status: Room air    Exams:  · Fine Motor Coordination:    · -       Intact  · Gross Motor Coordination:  WFL  · Postural Exam:  Patient presented with the following abnormalities:    · -       Rounded shoulders  · Sensation:    · -       Impaired  with baseline neuropathy worse in L hand (wearing glove) and BLE neuropathy from feet to mid-shin  · Skin Integrity/Edema:      · -       Skin integrity: Visible skin intact  · RLE ROM: WFL  · RLE Strength: WFL  · LLE ROM: WFL  · LLE Strength: WFL except hip flexion 4-/5    Functional Mobility:  · Bed Mobility:     · Scooting: supervision  · Supine to Sit: supervision  · Transfers:     · Sit to Stand:  contact guard assistance with no AD and additional time/effort to complete  · Bed to Chair: contact guard assistance with  no AD  using  Step Transfer  · Gait: ~50 ft with no AD with CGA - ambulates at slow cristofer with shuffled steps, LEs ER, and no overt LOB. Pt reporting feeling near his baseline, just slightly slower.    Therapeutic Activities and Exercises:  Educated pt on role of PT and pt agreeable to participate in therapy session.  Pt transitioned to sit EOB then stood and ambulated 2 rounds in ICu room prior to sitting up in chair.  Left up in chair, LEs reclined.    AM-PAC 6 CLICK MOBILITY  Total Score:18     Patient left up in chair with all lines intact, call button in reach and nurse notified.    GOALS:   Multidisciplinary Problems     Physical Therapy Goals        Problem: Physical Therapy Goal    Goal Priority Disciplines Outcome Goal Variances Interventions   Physical Therapy Goal      PT, PT/OT Ongoing, Progressing     Description: Goals to be met by: 22     Patient will increase functional independence with mobility by performin. Supine <> sit with Modified [place]  2. Sit to stand transfer with Modified [place]  3. Bed to chair transfer with Modified [place]   4. Gait  x 100 feet with Supervision                      History:     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 2012    Type II or unspecified type diabetes mellitus with neurological manifestations, uncontrolled(250.62) 3/8/2013    Urinary tract infection        Past Surgical History:   Procedure Laterality Date    BACK SURGERY      CATARACT EXTRACTION W/  INTRAOCULAR LENS IMPLANT Right     Per Dr Romero note 2018    COLONOSCOPY N/A 2019    Procedure: COLONOSCOPY Suprep;  Surgeon: Anh Johnson MD;  Location: [facility] ENDO;  Service: Endoscopy;  Laterality: N/A;    EYE SURGERY      HERNIA REPAIR      LEFT HEART CATHETERIZATION Left 2022    Procedure: CATHETERIZATION, HEART, LEFT;  Surgeon: Will Hurst III, MD;  Location: [facility] CATH LAB/EP;  Service: Cardiology;  Laterality: Left;    renal stones      SHOULDER OPEN ROTATOR CUFF REPAIR         Time Tracking:     PT Received On: 22  PT Start Time: 919     PT Stop Time: 945  PT Total Time (min): 26 min     Billable Minutes: Evaluation 15 and Gait Training 11      2022

## 2022-01-13 NOTE — HOSPITAL COURSE
1/13/2022 Per HPI  1/14/2022 Transferred out of ICU yesterday. Converted to NSR. MRI with stroke. Neurology on board. CBC and BMP WNL. HR and BP stable overnight

## 2022-01-13 NOTE — ASSESSMENT & PLAN NOTE
- recent STEMI with RCA LAUREN; LAD 70% medically managed with outpatient cardiac PET to guide revascularization  - no complaints of chest pain; was on DAPT with ASA and Brilinta; will change to Plavix given risk with DOAC use; will reload with Plavix this evening  - stressed importance of DAPT prior to discharge and will continue to stress importance  - continue BB and statin

## 2022-01-13 NOTE — ASSESSMENT & PLAN NOTE
-Noted on EKG in ED upon arrival  -Appears to be new onset - patient voices no prior history; review of EMR with no prior mention  -Currently rate controlled on metoprolol  -Typical management would be to initiate anticoagulation to reduce risk of thromboembolic event from afib.  Patient has a [id number] score of 7 placing him at high risk.  HAS-BLED score places him at 3 however his risk is higher given he is and will need to be on DAPT.  -Unclear if current afib represents underlying PAF or if this was triggered by his recent MI  -Patient now with embolic stroke to right frontal MCA and left cerebellum

## 2022-01-13 NOTE — ASSESSMENT & PLAN NOTE
-Stroke risk factor  -Resume home regiment slowly over next 24-48 hours  -Review of EMR shows elevated readings during last hospitalization and recent office visits.  Patient needs improved control for long-term goal of <130/80

## 2022-01-13 NOTE — ASSESSMENT & PLAN NOTE
- new onset  - presented with afib with acute CVA on EKG; atrial flutter overnight on telemetry  - spontaneously converted earlier today  - continue Toprol XL; will need chronic OAC with Xarelto or Eliquis but will await for clearance from Neurology

## 2022-01-13 NOTE — ASSESSMENT & PLAN NOTE
Presents with left facial droop and dysarthria x 1 day  CTA head with subacute infarcts s/p stent placement  Cont DAPT, statin  MRI brain  MRA brain and neck  Echo with bubble  Consult vascular neurology: Continue DAPT, obtain MRI/MRA head/neck. Cerebellar stroke not large enough that significant mass effect anticipated, but maintain Na >140.  Consult PT/OT/ST  Recent lipids, A1C noted  TSH normal

## 2022-01-13 NOTE — ASSESSMENT & PLAN NOTE
Type 2 diabetes mellitus with hyperglycemia, with long-term current use of insulin    Takes metformin, lantus 28u nightly, and novolog 17u with meals--hold home meds  A1C 12.3  -cont IV fluids  -insulin drip  -hourly accuchecks  -NPO  -start detemir 14u nightly if able to swallow  -BMP q4h  -transition off insulin when gap closes, start diabetic diet

## 2022-01-13 NOTE — PLAN OF CARE
Problem: Physical Therapy Goal  Goal: Physical Therapy Goal  Description: Goals to be met by: 22     Patient will increase functional independence with mobility by performin. Supine <> sit with Modified [place]  2. Sit to stand transfer with Modified [place]  3. Bed to chair transfer with Modified [place]   4. Gait  x 100 feet with Supervision     Outcome: Ongoing, Progressing     PT evaluation completed, note to follow. Noted pt with L facial droop and short term memory deficits and with L hip flexion with slight weakness, otherwise appears near baseline. Recommending OP PT and SLP upon d/c.

## 2022-01-13 NOTE — CONSULTS
[facility] - Intensive Care  Vascular Neurology  Comprehensive Stroke Center  Consult Note    Inpatient consult to Vascular (Stroke) Neurology  Consult performed by: Gayathri Iverson NP  Consult ordered by: Nyla Tripp NP        Assessment/Plan:     Patient is a 76 y.o. year old male with:    * Embolic stroke involving right middle cerebral artery  75 y/o male with HTN, DM2, CAD, recent STEMI and new onset afib now with acute/subacute infarcts involving the right frontal MCA and left cerebellum noted on non-contrast head CT. Etiology most likely cardioembolic in setting of new onset afib and recent STEMI.  MRI/MRA/TTE pending workup. Unclear if current afib represents underlying PAF or if this was triggered by his recent MI.    Typical management would be to initiate anticoagulation to reduce risk of thromboembolic event from afib.  Patient has a [id number] score of 7 placing him at very high risk.  HAS-BLED score 3. Although risk will be higher given he is and will need to be on DAPT.      Patients's risk for stroke outweighs the risk of hemorrhage at this time.  Recommend starting anticoagulation.  His bleeding risk will be higher given continuation of DAPT in setting of LAUREN.  Implement bleeding risk reduction strategies - improved BP control, avoiding alcohol, avoiding other bleeding risk OTC meds (NSAIDS, etc.).  Could also consider switching ticagrelor to clopidegril for added bleeding risk reduction (would discuss with Cardiology).  Would also initiate PPI coverage.      Antithrombotics for secondary stroke prevention: Anticoagulants: Apixaban 5 mg BID - start 7 days post event due to size and presence of petechial hemorrhage.    Statins for secondary stroke prevention and hyperlipidemia, if present:   Statins: Atorvastatin- 40 mg daily    Aggressive risk factor modification: HTN, DM, HLD, Obesity, A-Fib, CAD     Rehab efforts: The patient has been evaluated by a stroke team provider and the therapy  needs have been fully considered based off the presenting complaints and exam findings. The following therapy evaluations are needed: PT evaluate and treat, OT evaluate and treat, SLP evaluate and treat    Diagnostics ordered/pending: MRA head to assess vasculature, MRA neck/arch to assess vasculature, MRI head without contrast to assess brain parenchyma, TTE to assess cardiac function/status     VTE prophylaxis: Enoxaparin 40 mg SQ every 24 hours    -[facility]ascular Neurology to follow MRI/MRA/TTE and make additional recommendations as indicated  -Ambulatory referral to Vascular Neurology in 4-6 weeks (Consult Order [id number])        Embolic stroke involving left cerebellar artery     See Embolic stroke involving right middle cerebral artery           Dysarthria and anarthria  -due to stroke  -mild symptoms  -SLP evaluate and treat    Afib  -Noted on EKG in ED upon arrival  -Appears to be new onset - patient voices no prior history; review of EMR with no prior mention  -Currently rate controlled on metoprolol  -Typical management would be to initiate anticoagulation to reduce risk of thromboembolic event from afib.  Patient has a [id number] score of 7 placing him at high risk.  HAS-BLED score places him at 3 however his risk is higher given he is and will need to be on DAPT.  -Unclear if current afib represents underlying PAF or if this was triggered by his recent MI  -Patient now with embolic stroke to right frontal MCA and left cerebellum    Coronary artery disease involving native coronary artery of native heart with unstable angina pectoris  -Stroke risk factor  -Recent STEMI 1/9/2022 with successful PCI and LAUREN  -Discharged on DAPT - ASA and ticagrelor with plans to continue x 1 year    Type 2 diabetes mellitus with hyperglycemia, with long-term current use of insulin  -Stroke risk factor  -A1c 12.3 on 12/29/2021  -Goal glucose 140-180 while hospitalized  -Patient needs improved compliance with medication, diet and  activity  -Consider consult to Endocrinology    Primary hypertension  -Stroke risk factor  -Resume home regiment slowly over next 24-48 hours  -Review of EMR shows elevated readings during last hospitalization and recent office visits.  Patient needs improved control for long-term goal of <130/80      STROKE DOCUMENTATION     Acute Stroke Times   Last Known Normal Date: 01/11/22  Last Known Normal Time: 1030  Stroke Team Called Date: 01/12/22  Stroke Team Called Time: 1100  Stroke Team Arrival Date: 01/12/22  Stroke Team Arrival Time: 1105  CT Interpretation Time: 1110  Alteplase Recommended: No  Thrombectomy Recommended: No    NIH Scale:  1a. Level of Consciousness: 0-->Alert, keenly responsive  1b. LOC Questions: 1-->Answers one question correctly  1c. LOC Commands: 0-->Performs both tasks correctly  2. Best Gaze: 0-->Normal  3. Visual: 0-->No visual loss  4. Facial Palsy: 1-->Minor paralysis (flattened nasolabial fold, asymmetry on smiling)  5a. Motor Arm, Left: 0-->No drift, limb holds 90 (or 45) degrees for full 10 secs  5b. Motor Arm, Right: 0-->No drift, limb holds 90 (or 45) degrees for full 10 secs  6a. Motor Leg, Left: 0-->No drift, leg holds 30 degree position for full 5 secs  6b. Motor Leg, Right: 0-->No drift, leg holds 30 degree position for full 5 secs  7. Limb Ataxia: 0-->Absent  8. Sensory: 0-->Normal, no sensory loss  9. Best Language: 0-->No aphasia, normal  10. Dysarthria: 1-->Mild-to-moderate dysarthria, patient slurs at least some words and, at worst, can be understood with some difficulty  11. Extinction and Inattention (formerly Neglect): 0-->No abnormality  Total (NIH Stroke Scale): 3    Modified Powell    Patsy Coma Scale:    ABCD2 Score:    [id number] Score:7  HAS -BLED Score:3  ICH Score:   Hunt & Alexander Classification:       Thrombolysis Candidate? No, Out of window , Left heart thrombus, pericarditis, recent MI     Delays to Thrombolysis?  Not Applicable    Interventional  Revascularization Candidate?   Is the patient eligible for mechanical endovascular reperfusion (MARY)?  No; at this time symptoms not suggestive of large vessel occlusion    Delays to Thrombectomy? Not Applicable    Hemorrhagic change of an Ischemic Stroke: Does this patient have an ischemic stroke with hemorrhagic changes? Yes, Grading Scale: HI Type 1 (HI-1) = small petechiae along the margins of the infarct. Is this a symptomatic change?  No - Hemorrhage is not clinically significant     Subjective:     History of Present Illness:  75 y/o male with HTN, DM2, CAD and recent STEMI brought to the ER by family for slurred speech and left facial droop.  Patient was recently admitted (1/9/2022) with chest pain and was found to have 100% occlusion of the RCA.  Patient underwent successful PCI with LAUREN placed.  Patient did well and was discharged 1/11/2022 with plans to continue DAPT (ASA+Ticagrelor) x 1 year.  Upon arrival after discharge home, family noted slurred speech and left facial droop.  Patient states he was not aware of these symptoms.  He initially refused to return to the ER but eventually allowed his family to call EMS.  Patient states he has never had these symptoms before.  He has no known history of stroke or TIA.  His symptoms have been about the same since they first started.  He received no treatment.  Initial CT imaging showed acute/subacute infarcts within the right frontal lobe and the left cerebellar hemisphere.      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 8/2/2012    Type II or unspecified type diabetes mellitus with neurological manifestations, uncontrolled(250.62) 3/8/2013    Urinary tract infection        Past Surgical History:   Procedure Laterality Date    BACK SURGERY      CATARACT EXTRACTION W/  INTRAOCULAR LENS  IMPLANT Right     Per Dr Romero note 2018    COLONOSCOPY N/A 2019    Procedure: COLONOSCOPY Suprep;  Surgeon: Anh Johnson MD;  Location: [facility] ENDO;  Service: Endoscopy;  Laterality: N/A;    EYE SURGERY      HERNIA REPAIR      LEFT HEART CATHETERIZATION Left 2022    Procedure: CATHETERIZATION, HEART, LEFT;  Surgeon: Will Hurst III, MD;  Location: [facility] CATH LAB/EP;  Service: Cardiology;  Laterality: Left;    renal stones      SHOULDER OPEN ROTATOR CUFF REPAIR         Family History   Problem Relation Age of Onset    Prostate cancer Neg Hx     Kidney disease Neg Hx        Social History     Socioeconomic History    Marital status:    Tobacco Use    Smoking status: Former Smoker     Packs/day: 1.50     Years: 25.00     Pack years: 37.50     Quit date: 1983     Years since quittin.0    Smokeless tobacco: Never Used   Substance and Sexual Activity    Alcohol use: No    Drug use: No    Sexual activity: Yes     Partners: Female   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       Current Facility-Administered Medications   Medication Dose Route Frequency Provider Last Rate Last Admin    acetaminophen suppository 650 mg  650 mg Rectal Q6H PRN Nyla Tripp NP   650 mg at 22    acetaminophen tablet 650 mg  650 mg Oral Q8H PRN Arik Whelan MD        acetaminophen tablet 650 mg  650 mg Oral Q4H PRN Nyla Tripp NP        aspirin EC tablet 81 mg  81 mg Oral Daily Nyla Tripp NP   81 mg at 22 0823    atorvastatin tablet 40 mg  40 mg Oral Daily Nyla Tripp NP   40 mg at 22 0823    dextrose 5 % and 0.45 % NaCl infusion  125 mL/hr Intravenous Continuous PRN Nyla Tripp NP   Paused at 22 2308    dextrose 50% injection 12.5 g  12.5 g Intravenous PRN Nyla Tripp NP   12.5 g at 22 2311    dextrose 50% injection 25 g  25 g  Intravenous PRN Nyla Tripp NP        enoxaparin injection 40 mg  40 mg Subcutaneous Daily Nyla Tripp NP   40 mg at 01/12/22 1731    glucagon (human recombinant) injection 1 mg  1 mg Intramuscular PRN Hyun Obando NP        glucose chewable tablet 16 g  16 g Oral PRN Hyun Obando NP        glucose chewable tablet 24 g  24 g Oral PRN Hyun Obando NP        influenza (QUADRIVALENT ADJUVANTED PF) vaccine 0.5 mL  0.5 mL Intramuscular Prior to discharge Heather Vitale MD        insulin aspart U-100 pen 0-5 Units  0-5 Units Subcutaneous QID (AC + HS) PRN Hyun Obando NP   2 Units at 01/13/22 0825    insulin detemir U-100 pen 21 Units  21 Units Subcutaneous QHS Huey Diaz MD   21 Units at 01/13/22 0541    lactated ringers infusion   Intravenous Continuous Nyla Tripp  mL/hr at 01/13/22 0621 Rate Verify at 01/13/22 0621    melatonin tablet 6 mg  6 mg Oral Nightly PRN Arik Whelan MD        metoprolol succinate (TOPROL-XL) 24 hr tablet 25 mg  25 mg Oral Daily Nyla Tripp NP   25 mg at 01/13/22 0823    mupirocin 2 % ointment   Nasal BID Heather Vitale MD   Given at 01/13/22 0829    ondansetron injection 4 mg  4 mg Intravenous Q12H PRN Nyla Tripp NP        pantoprazole EC tablet 40 mg  40 mg Oral Daily Nyla Tripp NP   40 mg at 01/13/22 0823    pneumoc 13-edyta conj-dip cr(PF) (PREVNAR 13 (PF)) 0.5 mL  0.5 mL Intramuscular Prior to discharge Heather Vitale MD        sodium chloride 0.9% bolus 500 mL  500 mL Intravenous Continuous PRN Nyla Tripp NP        sodium chloride 0.9% flush 10 mL  10 mL Intravenous PRN Arik Whelan MD        sodium chloride 0.9% flush 10 mL  10 mL Intravenous PRN Arik Whelan MD        sodium chloride 0.9% flush 10 mL  10 mL Intravenous PRN Nyla Tripp NP        sodium chloride 0.9% flush 10 mL  10 mL Intravenous  "PRN Nyla Tripp NP        sodium chloride 0.9% flush 10 mL  10 mL Intravenous PRN Nyla Tripp NP        ticagrelor tablet 90 mg  90 mg Oral BID Nyla Tripp NP   90 mg at 01/13/22 0823     Home medications reviewed    Review of patient's allergies indicates:   Allergen Reactions    Iodine      Other reaction(s): swelling  Other reaction(s): Itching  Other reaction(s): Rash         Review of Systems   Constitutional: Negative for chills and fever.   HENT: Negative for drooling and trouble swallowing.    Eyes: Negative for pain and visual disturbance.   Respiratory: Negative for cough and shortness of breath.    Cardiovascular: Negative for chest pain and palpitations.   Gastrointestinal: Negative for abdominal pain, nausea and vomiting.   Genitourinary: Negative for difficulty urinating and hematuria.   Musculoskeletal: Negative for gait problem.   Skin: Negative for color change and rash.   Neurological: Positive for facial asymmetry and speech difficulty. Negative for dizziness, weakness, light-headedness and headaches.   Psychiatric/Behavioral: Negative for confusion.   All other systems reviewed and are negative.    Objective:   Vitals: BP (!) 165/69   Pulse 105   Temp 97.2 °F (36.2 °C) (Oral)   Resp 20   Ht 6' 1" (1.854 m)   Wt 90.5 kg (199 lb 8.3 oz)   SpO2 (!) 90%   BMI 26.32 kg/m²       Physical Exam  Constitutional:       General: He is not in acute distress.  HENT:      Head: Normocephalic and atraumatic.   Eyes:      Extraocular Movements: EOM normal.   Pulmonary:      Effort: Pulmonary effort is normal. No respiratory distress.   Neurological:      Mental Status: He is alert.   Psychiatric:         Attention and Perception: Attention normal.         Mood and Affect: Affect is flat.       Neurological Exam  LOC: alert  Attention Span: Patient able to follow exam but flat affect and requires repeat instructions  Language: No aphasia  Articulation: Mild " dysarthria  Orientation: Person, Place, Time but did get month incorrect  Visual Fields: Full  EOM (CN III, IV, VI): Full/intact  Pupils (CN II, III): PERRL  Facial Sensation (CN V): Normal  Facial Movement (CN VII): Lower facial weakness on the Left  Motor: Arm left  Full antigravity; some loss of power noted with nurse assistance  Leg left    Arm right  Full antigravity; full power noted with nurse assistance  Leg right Full antigravity; full power noted with nurse assistance  Cerebellum: No evidence of appendicular or axial ataxia  Sensation: Intact to light touch    Diagnostic Results     Brain Imaging   1/12/2022 CT Head  Hypodensity right frontal MCA territory and left cerebellum. Changes within right frontal area concerning for petechial hemorrhagic transformation. No significant mass effect.     Vessel Imaging   MRA pending    Cardiac Imaging   TTE pending                IP Unit  Spoke hospital nurse at bedside with patient assisting consultant.     Patient information was obtained from patient.     The attending portion of this evaluation, treatment, and documentation was performed per Gayathri Iverson NP via audiovisual.    Billing code:  (moderate to severe stroke, large areas of edema, some mimics)    · This patient has a critical neurological condition/illness, with high morbidity and mortality.  · There is a high probability for acute neurological change leading to clinical and possibly life-threatening deterioration requiring highest level of physician preparedness for urgent intervention.  · Care was coordinated with other physicians involved in the patient's care.  · Radiologic studies and laboratory data were reviewed and interpreted, and plan of care was re-assessed based on the results.  · Diagnosis, treatment options and prognosis may have been discussed with the patient and/or family members or caregiver.  · Further advanced medical management and further evaluation is warranted for his  care.      Consult End Time: 1:00 PM       Gayathri Iverson NP  [facility]  Department of Vascular Neurology   [facility] - [facility]ensive [facility]

## 2022-01-13 NOTE — ASSESSMENT & PLAN NOTE
Type 2 diabetes mellitus with hyperglycemia, with long-term current use of insulin    Takes metformin, lantus 28u nightly, and novolog 17u with meals--hold home meds  A1C 12.3  -cont IV fluids  -insulin drip  -hourly accuchecks  -NPO- cleared by speech  - continue detemir 14u nightly if able to swallow  -BMP q4h  -transition off insulin when gap closes, start diabetic diet

## 2022-01-13 NOTE — ASSESSMENT & PLAN NOTE
- HgbA1c 12 last admission  - DKA upon admission; management per primary team  - stressed importance of blood sugar control with patient on previous admission and will continue to reinforce

## 2022-01-13 NOTE — PROGRESS NOTES
[facility]  Progress Note    Patient Name: Kamar Muñoz  MRN: [id number]  Patient Class: IP- Inpatient   Admission Date: 1/12/2022  Length of Stay: 1 days  Attending Physician: Heather Vitale*  Primary Care Provider: Basim Guerrero MD        Subjective:     Principal Problem:Cerebrovascular accident (CVA)        HPI:  Kamar Muñoz is a 75 yo male with a pmh of HTN, STEMI with stent placement on 1/9/22, DM2. He presented today with stroke symptoms x 1 day. He was noted to have a left  facial droop and slurred speech at home since yesterday morning after discharging from [place] s/ STEMI with coronary stent placement. He resisted returning to the hospital yesterday. The patient states he had not started taking the brilinta yet because he did not know how it would interact with his other medications. He says he fell last night by tripping on a rug and hit his right arm and leg, denies hitting head. He denies any symptoms and states he cannot hear that he has slurred speech. He was also found to be in DKA on arrival. He does not recall if he took his insulin at home last night. He was evaluated by neurovascular provider after head CT showed subacute embolic strokes. EKG with Afib. She recommended continued DAPT, MRI/MRA brain imaging. Given a liter of IV fluids and started on insulin drip per ED provider.       Overview/Hospital Course:  No notes on file    Interval History:  awake and alert, no new complaint  Awaiting images and TTE.  Appreciate Neurology recs  Step-down to the floor    Review of Systems   Constitutional: Negative for chills and fever.   HENT: Negative for congestion.    Eyes: Negative for visual disturbance.   Respiratory: Negative for cough and shortness of breath.    Cardiovascular: Negative for chest pain.   Gastrointestinal: Negative for abdominal pain and nausea.   Genitourinary: Negative for difficulty urinating and dysuria.    Musculoskeletal: Negative for arthralgias and myalgias.   Skin: Positive for wound (right arm abrasion).   Neurological: Positive for weakness. Negative for dizziness, speech difficulty, numbness and headaches.   Psychiatric/Behavioral: Negative for confusion.     Objective:     Vital Signs (Most Recent):  Temp: 97.5 °F (36.4 °C) (01/13/22 0730)  Pulse: 105 (01/13/22 0800)  Resp: 20 (01/13/22 0800)  BP: (!) 165/69 (01/13/22 0800)  SpO2: (!) 90 % (01/13/22 0800) Vital Signs (24h Range):  Temp:  [97.5 °F (36.4 °C)-100.9 °F (38.3 °C)] 97.5 °F (36.4 °C)  Pulse:  [] 105  Resp:  [18-32] 20  SpO2:  [90 %-96 %] 90 %  BP: (110-165)/(59-85) 165/69     Weight: 90.5 kg (199 lb 8.3 oz)  Body mass index is 26.32 kg/m².    Intake/Output Summary (Last 24 hours) at 1/13/2022 1123  Last data filed at 1/13/2022 0621  Gross per 24 hour   Intake 3048.09 ml   Output 950 ml   Net 2098.09 ml      Physical Exam  Vitals and nursing note reviewed.   Constitutional:       General: He is not in acute distress.     Appearance: Normal appearance. He is ill-appearing (chronic).   HENT:      Head: Normocephalic and atraumatic.   Cardiovascular:      Rate and Rhythm: Normal rate. Rhythm irregular.      Pulses: Normal pulses.      Heart sounds: Normal heart sounds.   Pulmonary:      Effort: Pulmonary effort is normal.      Comments: Mild tachypnea   Abdominal:      General: Bowel sounds are normal.      Palpations: Abdomen is soft.   Musculoskeletal:         General: No swelling. Normal range of motion.      Cervical back: Normal range of motion.   Skin:     General: Skin is warm and dry.      Comments: Abrasion to right arm   Neurological:      Mental Status: He is alert and oriented to person, place, and time.      Motor: Weakness present.      Comments:  mild dysarthria, good strength to all extremities, normal mentation on exam   Psychiatric:         Behavior: Behavior normal.         Thought Content: Thought content normal.          Significant Labs:   A1C:   Recent Labs   Lab 08/26/21  0855 12/29/21  0810   HGBA1C 10.9* 12.3*     ABGs:   Recent Labs   Lab 01/12/22  1407   PH 7.295*   PCO2 33.6*   HCO3 16.3*   POCSATURATED 57*   BE -10   PO2 33*     Blood Culture: No results for input(s): LABBLOO in the last 48 hours.  CBC:   Recent Labs   Lab 01/12/22  1057 01/12/22  1407 01/13/22  0359   WBC 10.72  --  14.69*   HGB 13.6*  --  12.6*   HCT 41.3 40 38.9*     --  237     CMP:   Recent Labs   Lab 01/12/22  1057 01/12/22  1357 01/12/22  2316 01/13/22  0358 01/13/22  0815      < > 144 142  142 143   K 5.4*   < > 3.9 4.7  4.5 4.2      < > 113* 110  109 110   CO2 16*   < > 21* 18*  20* 19*   *   < > 110 250*  250* 225*   BUN 48*   < > 42* 41*  41* 38*   CREATININE 1.7*   < > 1.4 1.4  1.5* 1.4   CALCIUM 8.9   < > 8.2* 8.0*  8.1* 8.1*   PROT 7.2  --   --   --   --    ALBUMIN 3.5  --   --   --   --    BILITOT 1.7*  --   --   --   --    ALKPHOS 107  --   --   --   --    AST 28  --   --   --   --    ALT 29  --   --   --   --    ANIONGAP 21*   < > 10 14  13 14   EGFRNONAA 38*   < > 48* 48*  44* 48*    < > = values in this interval not displayed.     Lactic Acid:   Recent Labs   Lab 01/12/22  1938 01/12/22  2316 01/13/22  0358   LACTATE 3.9* 1.4 1.1     Lipase: No results for input(s): LIPASE in the last 48 hours.  Lipid Panel: No results for input(s): CHOL, HDL, LDLCALC, TRIG, CHOLHDL in the last 48 hours.  Magnesium:   Recent Labs   Lab 01/13/22  0358   MG 2.1     Troponin:   Recent Labs   Lab 01/13/22  0358   TROPONINI 12.121*     TSH:   Recent Labs   Lab 01/12/22  1057   TSH 0.509     Urine Culture: No results for input(s): LABURIN in the last 48 hours.  Urine Studies:   Recent Labs   Lab 01/13/22  0607   COLORU Yellow   APPEARANCEUA Clear   PHUR 6.0   SPECGRAV 1.025   PROTEINUA 1+*   GLUCUA 2+*   KETONESU 2+*   BILIRUBINUA Negative   OCCULTUA 1+*   NITRITE Negative   UROBILINOGEN Negative   LEUKOCYTESUR  Negative   RBCUA 8*   WBCUA 7*   BACTERIA Rare   HYALINECASTS 0       Significant Imaging: I have reviewed all pertinent imaging results/findings within the past 24 hours.      Assessment/Plan:      * Cerebrovascular accident (CVA)  Presents with left facial droop and dysarthria x 1 day  CTA head with subacute infarcts s/p stent placement  Cont DAPT, statin  MRI brain  MRA brain and neck  Echo with bubble  Consult vascular neurology: Continue DAPT, obtain MRI/MRA head/neck. Cerebellar stroke not large enough that significant mass effect anticipated, but maintain Na >140.  Consult PT/OT/ST  Recent lipids, A1C noted  TSH normal          BRENDAN (acute kidney injury)  Creatinine 1.7 with baseline 1.0-1.3  Cont IV fluids      Afib  New onset Afib  Consult cardiology      Diabetic ketoacidosis without coma associated with type 2 diabetes mellitus  Type 2 diabetes mellitus with hyperglycemia, with long-term current use of insulin    Takes metformin, lantus 28u nightly, and novolog 17u with meals--hold home meds  A1C 12.3  -cont IV fluids  -insulin drip  -hourly accuchecks  -NPO- cleared by speech  - continue detemir 14u nightly if able to swallow  -BMP q4h  -transition off insulin when gap closes, start diabetic diet          Coronary artery disease involving native coronary artery of native heart with unstable angina pectoris  Cont ASA, statin, brilinta  Recent stent placement      Type 2 diabetes mellitus with hyperglycemia, with long-term current use of insulin  See DKA      Primary hypertension  Hold losartan and cardizem   Permissive HTN          VTE Risk Mitigation (From admission, onward)         Ordered     enoxaparin injection 40 mg  Daily         01/12/22 1608     IP VTE HIGH RISK PATIENT  Once         01/12/22 1608     Place sequential compression device  Until discontinued         01/12/22 1608                Discharge Planning   ALLIE:      Code Status: Full Code   Is the patient medically ready for discharge?:      Reason for patient still in hospital (select all that apply): Patient trending condition               Critical care time spent on the evaluation and treatment of severe organ dysfunction, review of pertinent labs and imaging studies, discussions with consulting providers and discussions with patient/family: 35 minutes.      Heather Vitale MD  Department of Hospital Medicine   [facility] - Intensive Care

## 2022-01-13 NOTE — PROGRESS NOTES
Notified LUIS Langley pt's BS 53. Ordered to stop insulin and d51/2NS gtt. Give half amp of d50. Continue fluids. Stat bmp and lactic acid. Start on d10 @15cc/hr. Recheck BS in 30 min.

## 2022-01-13 NOTE — PLAN OF CARE
Pt performing at baseline and does not required skilled acute OT services at this time.   D/C rec: ST  DME rec: none; pt has walk-in shower with built in chair and a raised toilet

## 2022-01-13 NOTE — CONSULTS
[facility] - Intensive Care  Cardiology  Consult Note    Patient Name: Kamar Muñoz  MRN: [id number]  Admission Date: 1/12/2022  Hospital Length of Stay: 1 days  Code Status: Full Code   Attending Provider: Heather Vitale*   Consulting Provider: HIRAL Sargent, ANP  Primary Care Physician: Basim Guerrero MD  Principal Problem:Embolic stroke involving right middle cerebral artery    Patient information was obtained from patient, relative(s), past medical records and ER records.     Inpatient consult to [facility]iology[facility]  Consult performed by: HIRAL Fournier, ANP  Consult ordered by: Heather Vitale MD    Inpatient consult to [facility]  Consult performed by: HIRAL Fournier, MARIAH  Consult ordered by: Nyla Tripp NP  Reason for consult: new onset afib; acute CVA         Subjective:     Chief Complaint:  Slurred speech and left facial numbness      HPI:   77yo male with CAD s/p RCA LAUREN with recent STEMI, DMII- uncontrolled, HTN, afib- new onset, BRENDAN and DKA who presented to the ER with slurred speech. Mr. Jean was discharged on 1/11/2022 following admission for STEMI. His family noted left facial droop and slurred speech. He was hesitant to return to the hospital however his family finally convinced him. He was also found to be in DKA upon arrival. He is prescribed insulin at home but cannot recall if he took his routine dose. Upon arrival in the ER, his EKG demonstrated afib (new finding). He underwent CT head with demonstration of subacute embolic stroke. He was admitted to [facility] for further evaluation with MRI/MRA pending. Cardiology consulted for afib and CAD management               Hospital Course: see HPI   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 8/2/2012     Type II or unspecified type diabetes mellitus with neurological manifestations, uncontrolled(250.62) 3/8/2013    Urinary tract infection        Past Surgical History:   Procedure Laterality Date    BACK SURGERY      CATARACT EXTRACTION W/  INTRAOCULAR LENS IMPLANT Right     Per Dr Romero note 11/2018    COLONOSCOPY N/A 1/28/2019    Procedure: COLONOSCOPY Suprep;  Surgeon: Anh Johnson MD;  Location: [facility] ENDO;  Service: Endoscopy;  Laterality: N/A;    EYE SURGERY      HERNIA REPAIR      LEFT HEART CATHETERIZATION Left 1/9/2022    Procedure: CATHETERIZATION, HEART, LEFT;  Surgeon: Will Hurst III, MD;  Location: [facility] CATH LAB/EP;  Service: Cardiology;  Laterality: Left;    renal stones      SHOULDER OPEN ROTATOR CUFF REPAIR         Review of patient's allergies indicates:   Allergen Reactions    Iodine      Other reaction(s): swelling  Other reaction(s): Itching  Other reaction(s): Rash       No current facility-administered medications on file prior to encounter.     Current Outpatient Medications on File Prior to Encounter   Medication Sig    aspirin (ECOTRIN) 81 MG EC tablet Take 81 mg by mouth once daily.    atorvastatin (LIPITOR) 40 MG tablet Take 1 tablet (40 mg total) by mouth once daily.    diltiaZEM (CARDIZEM CD) 120 MG Cp24 Take 1 capsule (120 mg total) by mouth once daily.    gabapentin (NEURONTIN) 300 MG capsule Take 1 capsule (300 mg total) by mouth 2 (two) times daily. (Patient taking differently: Take 300 mg by mouth 2 (two) times daily. Takes nightly)    insulin aspart U-100 (NOVOLOG FLEXPEN U-100 INSULIN) 100 unit/mL (3 mL) InPn pen Inject 17 Units into the skin 3 (three) times daily with meals.    insulin glargine (LANTUS) 100 unit/mL injection Inject 28 Units into the skin every evening.    losartan (COZAAR) 25 MG tablet Take 1 tablet (25 mg total) by mouth once daily.    metFORMIN (GLUCOPHAGE) 500 MG tablet Take 1 tablet (500 mg total) by mouth 2 (two) times daily  "with meals.    metoprolol succinate (TOPROL-XL) 25 MG 24 hr tablet Take 1 tablet (25 mg total) by mouth once daily.    OMEPRAZOLE (PRILOSEC ORAL) Take 1 tablet by mouth daily as needed.     BD ULTRA-FINE SHORT PEN NEEDLE 31 gauge x 5/16" Ndle 3 (three) times daily.    blood sugar diagnostic Strp 1 strip by Misc.(Non-Drug; Combo Route) route 2 (two) times a day.    diclofenac sodium (VOLTAREN) 1 % Gel APPLY 2 GRAMS TO AFFECTED AREA ONCE D    ergocalciferol (ERGOCALCIFEROL) 50,000 unit Cap     glucagon, human recombinant, (GLUCAGON EMERGENCY KIT, HUMAN,) 1 mg SolR Inject 1 mg into the muscle as needed.    hydrocortisone 2.5 % cream APPLY TO GROIN RASH BID NO LONGER THAN 7 DAYS    ketoconazole (NIZORAL) 2 % cream APPLY TO GROIN RASH BID NO LONGER THAN 7 DAYS    lancets (ONETOUCH ULTRASOFT LANCETS) Misc 1 lancet by Misc.(Non-Drug; Combo Route) route 2 (two) times a day.    MULTIVITAMIN ORAL Take 1 tablet by mouth once daily.     nitroGLYCERIN (NITROSTAT) 0.4 MG SL tablet Place 1 tablet (0.4 mg total) under the tongue every 5 (five) minutes as needed for Chest pain.    pen needle, diabetic (PEN NEEDLE) 30 gauge x 5/16" Ndle 1 Units by Misc.(Non-Drug; Combo Route) route 3 (three) times daily.    polycarbophil (FIBERCON) 625 mg tablet Take 1 tablet by mouth once daily.     ticagrelor (BRILINTA) 90 mg tablet Take 1 tablet (90 mg total) by mouth 2 (two) times a day.     Family History    None       Tobacco Use    Smoking status: Former Smoker     Packs/day: 1.50     Years: 25.00     Pack years: 37.50     Quit date: 1983     Years since quittin.0    Smokeless tobacco: Never Used   Substance and Sexual Activity    Alcohol use: No    Drug use: No    Sexual activity: Yes     Partners: Female     Review of Systems   Constitutional: Negative for chills, decreased appetite, diaphoresis, fever, malaise/fatigue, weight gain and weight loss.   Cardiovascular: Negative for chest pain, claudication, dyspnea " on exertion, irregular heartbeat, leg swelling, near-syncope, orthopnea, palpitations and paroxysmal nocturnal dyspnea.   Respiratory: Negative for cough, shortness of breath, snoring, sputum production and wheezing.    Endocrine: Negative for cold intolerance, heat intolerance, polydipsia, polyphagia and polyuria.   Skin: Negative for color change, dry skin, itching, nail changes and poor wound healing.   Musculoskeletal: Negative for back pain, gout, joint pain and joint swelling.   Gastrointestinal: Negative for bloating, abdominal pain, constipation, diarrhea, hematemesis, hematochezia, melena, nausea and vomiting.   Genitourinary: Negative for dysuria, hematuria and nocturia.   Neurological: Negative for dizziness, headaches, light-headedness, numbness, paresthesias and weakness.   Psychiatric/Behavioral: Negative for altered mental status, depression and memory loss.     Objective:     Vital Signs (Most Recent):  Temp: 97.2 °F (36.2 °C) (01/13/22 1130)  Pulse: 87 (01/13/22 1400)  Resp: 20 (01/13/22 1400)  BP: (!) 160/77 (01/13/22 1400)  SpO2: (!) 94 % (01/13/22 1400) Vital Signs (24h Range):  Temp:  [97.2 °F (36.2 °C)-100.9 °F (38.3 °C)] 97.2 °F (36.2 °C)  Pulse:  [] 87  Resp:  [18-32] 20  SpO2:  [90 %-97 %] 94 %  BP: (110-175)/(59-95) 160/77     Weight: 90.3 kg (199 lb)  Body mass index is 26.25 kg/m².    SpO2: (!) 94 %  O2 Device (Oxygen Therapy): room air      Intake/Output Summary (Last 24 hours) at 1/13/2022 1459  Last data filed at 1/13/2022 0621  Gross per 24 hour   Intake 3048.09 ml   Output 950 ml   Net 2098.09 ml       Lines/Drains/Airways     Drain                 Urethral Catheter 01/13/22 0606 14 Fr. <1 day          Arterial Line            Arterial Line -- days          Peripheral Intravenous Line                 Peripheral IV - Single Lumen 01/12/22 1058 18 G Right Antecubital 1 day         Peripheral IV - Single Lumen 01/12/22 20 G Left Antecubital 1 day                Physical  Exam  Constitutional:       General: He is not in acute distress.     Appearance: He is well-developed and well-nourished.   Cardiovascular:      Rate and Rhythm: Normal rate and regular rhythm.      Heart sounds: No murmur heard.  No gallop.    Pulmonary:      Effort: Pulmonary effort is normal. No respiratory distress.      Breath sounds: Normal breath sounds. No wheezing.   Abdominal:      General: Bowel sounds are normal. There is no distension.      Palpations: Abdomen is soft.      Tenderness: There is no abdominal tenderness.   Skin:     General: Skin is warm and dry.   Neurological:      Mental Status: He is alert and oriented to person, place, and time.         Significant Labs:   BMP:   Recent Labs   Lab 01/13/22  0358 01/13/22  0815 01/13/22  1225   *  250* 225* 186*     142 143 143   K 4.7  4.5 4.2 3.9     109 110 109   CO2 18*  20* 19* 23   BUN 41*  41* 38* 37*   CREATININE 1.4  1.5* 1.4 1.3   CALCIUM 8.0*  8.1* 8.1* 8.5*   MG 2.1  --   --    , CBC   Recent Labs   Lab 01/12/22  1057 01/12/22  1407 01/13/22  0359   WBC 10.72  --  14.69*   HGB 13.6*  --  12.6*   HCT 41.3   < > 38.9*     --  237    < > = values in this interval not displayed.    and Troponin   Recent Labs   Lab 01/13/22  0358   TROPONINI 12.121*       Significant Imaging: Echocardiogram:   Transthoracic echo (TTE) complete ([facility] Only):   Results for orders placed or performed during the hospital encounter of 01/12/22   Echo Saline Bubble? Yes   Result Value Ref Range    BSA 2.16 m2    QEF 44 %     Assessment and Plan:     Embolic stroke involving left cerebellar artery  - CT with acute CVA  - MRI/MRA pending  - Neurology on board     Afib  - new onset  - presented with afib with acute CVA on EKG; atrial flutter overnight on telemetry  - spontaneously converted earlier today  - continue Toprol XL; will need chronic OAC with Xarelto or Eliquis but will await for clearance from Neurology     Coronary artery  disease involving native coronary artery of native heart with unstable angina pectoris  - recent STEMI with RCA LAUREN; LAD 70% medically managed with outpatient cardiac PET to guide revascularization  - no complaints of chest pain; was on DAPT with ASA and Brilinta; will change to Plavix given risk with DOAC use; will reload with Plavix this evening  - stressed importance of DAPT prior to discharge and will continue to stress importance  - continue BB and statin     Type 2 diabetes mellitus with hyperglycemia, with long-term current use of insulin  - HgbA1c 12 last admission  - DKA upon admission; management per primary team  - stressed importance of blood sugar control with patient on previous admission and will continue to reinforce     Primary hypertension  - SBP 140s-170s  - discharged on BB, ARB and CCB; BB only continued likely due to need for permissive HTN  - resume full home medication regimen once cleared by Neurology  - monitor BP closely         VTE Risk Mitigation (From admission, onward)         Ordered     enoxaparin injection 40 mg  Daily         01/12/22 1608     IP VTE HIGH RISK PATIENT  Once         01/12/22 1608     Place sequential compression device  Until discontinued         01/12/22 1608                Thank you for your consult. I will follow-up with patient. Please contact us if you have any additional questions.    HIRAL Sargent, ANP  Cardiology   [facility] - Intensive Care

## 2022-01-13 NOTE — ASSESSMENT & PLAN NOTE
-Stroke risk factor  -A1c 12.3 on 12/29/2021  -Goal glucose 140-180 while hospitalized  -Patient needs improved compliance with medication, diet and activity  -Consider consult to Endocrinology

## 2022-01-13 NOTE — H&P
[facility] Medicine  History & Physical    Patient Name: Kamar Muñoz  MRN: [id number]  Patient Class: IP- Inpatient  Admission Date: 1/12/2022  Attending Physician: Heather Vitale*   Primary Care Provider: Basim Guerrero MD         Patient information was obtained from patient, past medical records and ER records.     Subjective:     Principal Problem:Cerebrovascular accident (CVA)    Chief Complaint:   Chief Complaint   Patient presents with    Facial Droop     Pt and family noticed a facial droop yesterday around 1030 am. Pt had cardiac stents placed x2 days ago and is on blood thinners. Left, facial droop and slurred speech noted; no visual disturbances  or weakness noted. Aox4.         HPI: Kamar Muñoz is a 77 yo male with a pmh of HTN, STEMI with stent placement on 1/9/22, DM2. He presented today with stroke symptoms x 1 day. He was noted to have a left  facial droop and slurred speech at home since yesterday morning after discharging from [place] [facility]/ STEMI with coronary stent placement. He resisted returning to the hospital yesterday. The patient states he had not started taking the brilinta yet because he did not know how it would interact with his other medications. He says he fell last night by tripping on a rug and hit his right arm and leg, denies hitting head. He denies any symptoms and states he cannot hear that he has slurred speech. He was also found to be in DKA on arrival. He does not recall if he took his insulin at home last night. He was evaluated by neurovascular provider after head CT showed subacute embolic strokes. EKG with Afib. She recommended continued DAPT, MRI/MRA brain imaging. Given a liter of IV fluids and started on insulin drip per ED provider.       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 8/2/2012    Type II or unspecified type diabetes mellitus with neurological manifestations, uncontrolled(250.62) 3/8/2013    Urinary tract infection        Past Surgical History:   Procedure Laterality Date    BACK SURGERY      CATARACT EXTRACTION W/  INTRAOCULAR LENS IMPLANT Right     Per Dr Romero note 11/2018    COLONOSCOPY N/A 1/28/2019    Procedure: COLONOSCOPY Suprep;  Surgeon: Anh Johnson MD;  Location: [facility] ENDO;  Service: Endoscopy;  Laterality: N/A;    EYE SURGERY      HERNIA REPAIR      LEFT HEART CATHETERIZATION Left 1/9/2022    Procedure: CATHETERIZATION, HEART, LEFT;  Surgeon: Will Hurst III, MD;  Location: [facility] CATH LAB/EP;  Service: Cardiology;  Laterality: Left;    renal stones      SHOULDER OPEN ROTATOR CUFF REPAIR         Review of patient's allergies indicates:   Allergen Reactions    Iodine      Other reaction(s): swelling  Other reaction(s): Itching  Other reaction(s): Rash       No current facility-administered medications on file prior to encounter.     Current Outpatient Medications on File Prior to Encounter   Medication Sig    aspirin (ECOTRIN) 81 MG EC tablet Take 81 mg by mouth once daily.    atorvastatin (LIPITOR) 40 MG tablet Take 1 tablet (40 mg total) by mouth once daily.    diltiaZEM (CARDIZEM CD) 120 MG Cp24 Take 1 capsule (120 mg total) by mouth once daily.    gabapentin (NEURONTIN) 300 MG capsule Take 1 capsule (300 mg total) by mouth 2 (two) times daily. (Patient taking differently: Take 300 mg by mouth 2 (two) times daily. Takes nightly)    insulin aspart U-100 (NOVOLOG FLEXPEN U-100 INSULIN) 100 unit/mL (3 mL) InPn pen Inject 17 Units into the skin 3 (three) times daily with meals.    insulin glargine (LANTUS) 100 unit/mL injection Inject 28 Units into the skin every evening.    losartan (COZAAR) 25 MG tablet Take 1 tablet (25 mg total) by mouth once daily.    metFORMIN (GLUCOPHAGE) 500 MG tablet Take 1 tablet (500 mg  "total) by mouth 2 (two) times daily with meals.    metoprolol succinate (TOPROL-XL) 25 MG 24 hr tablet Take 1 tablet (25 mg total) by mouth once daily.    OMEPRAZOLE (PRILOSEC ORAL) Take 1 tablet by mouth daily as needed.     BD ULTRA-FINE SHORT PEN NEEDLE 31 gauge x 5/16" Ndle 3 (three) times daily.    blood sugar diagnostic Strp 1 strip by Misc.(Non-Drug; Combo Route) route 2 (two) times a day.    diclofenac sodium (VOLTAREN) 1 % Gel APPLY 2 GRAMS TO AFFECTED AREA ONCE D    ergocalciferol (ERGOCALCIFEROL) 50,000 unit Cap     glucagon, human recombinant, (GLUCAGON EMERGENCY KIT, HUMAN,) 1 mg SolR Inject 1 mg into the muscle as needed.    hydrocortisone 2.5 % cream APPLY TO GROIN RASH BID NO LONGER THAN 7 DAYS    ketoconazole (NIZORAL) 2 % cream APPLY TO GROIN RASH BID NO LONGER THAN 7 DAYS    lancets (ONETOUCH ULTRASOFT LANCETS) Misc 1 lancet by Misc.(Non-Drug; Combo Route) route 2 (two) times a day.    MULTIVITAMIN ORAL Take 1 tablet by mouth once daily.     nitroGLYCERIN (NITROSTAT) 0.4 MG SL tablet Place 1 tablet (0.4 mg total) under the tongue every 5 (five) minutes as needed for Chest pain.    pen needle, diabetic (PEN NEEDLE) 30 gauge x 5/16" Ndle 1 Units by Misc.(Non-Drug; Combo Route) route 3 (three) times daily.    polycarbophil (FIBERCON) 625 mg tablet Take 1 tablet by mouth once daily.     ticagrelor (BRILINTA) 90 mg tablet Take 1 tablet (90 mg total) by mouth 2 (two) times a day.     Family History    None       Tobacco Use    Smoking status: Former Smoker     Packs/day: 1.50     Years: 25.00     Pack years: 37.50     Quit date: 1983     Years since quittin.0    Smokeless tobacco: Never Used   Substance and Sexual Activity    Alcohol use: No    Drug use: No    Sexual activity: Yes     Partners: Female     Review of Systems   Constitutional: Negative for chills and fever.   HENT: Negative for congestion.    Eyes: Negative for visual disturbance.   Respiratory: Negative for " cough and shortness of breath.    Cardiovascular: Negative for chest pain.   Gastrointestinal: Negative for abdominal pain and nausea.   Genitourinary: Negative for difficulty urinating and dysuria.   Musculoskeletal: Negative for arthralgias and myalgias.   Skin: Positive for wound (right arm abrasion).   Neurological: Positive for weakness. Negative for dizziness, speech difficulty, numbness and headaches.   Psychiatric/Behavioral: Negative for confusion.     Objective:     Vital Signs (Most Recent):  Temp: 98.3 °F (36.8 °C) (01/12/22 2141)  Pulse: 85 (01/12/22 2141)  Resp: 18 (01/12/22 2141)  BP: (!) 131/59 (01/12/22 2135)  SpO2: 96 % (01/12/22 2141) Vital Signs (24h Range):  Temp:  [97.5 °F (36.4 °C)-100.9 °F (38.3 °C)] 98.3 °F (36.8 °C)  Pulse:  [] 85  Resp:  [18-32] 18  SpO2:  [91 %-96 %] 96 %  BP: (129-154)/(59-76) 131/59        There is no height or weight on file to calculate BMI.    Physical Exam  Vitals and nursing note reviewed.   Constitutional:       General: He is not in acute distress.     Appearance: Normal appearance. He is ill-appearing (chronic).   HENT:      Head: Normocephalic and atraumatic.      Nose: Nose normal.      Mouth/Throat:      Mouth: Mucous membranes are moist.   Eyes:      Pupils: Pupils are equal, round, and reactive to light.   Cardiovascular:      Rate and Rhythm: Normal rate. Rhythm irregular.      Pulses: Normal pulses.      Heart sounds: Normal heart sounds.   Pulmonary:      Effort: Pulmonary effort is normal.      Comments: Mild tachypnea   Abdominal:      General: Bowel sounds are normal.      Palpations: Abdomen is soft.   Musculoskeletal:         General: No swelling. Normal range of motion.      Cervical back: Normal range of motion.   Skin:     General: Skin is warm and dry.      Comments: Abrasion to right arm   Neurological:      Mental Status: He is alert and oriented to person, place, and time.      Motor: Weakness present.      Comments: Left facial droop,  mild dysarthria, good strength to all extremities, normal mentation on exam   Psychiatric:         Behavior: Behavior normal.         Thought Content: Thought content normal.           CRANIAL NERVES     CN III, IV, VI   Pupils are equal, round, and reactive to light.       Significant Labs: All pertinent labs within the past 24 hours have been reviewed.    Significant Imaging: I have reviewed all pertinent imaging results/findings within the past 24 hours.    Assessment/Plan:     * Cerebrovascular accident (CVA)  Presents with left facial droop and dysarthria x 1 day  CTA head with subacute infarcts s/p stent placement  Cont DAPT, statin  MRI brain  MRA brain and neck  Echo with bubble  Consult vascular neurology  Consult PT/OT/ST  Recent lipids, A1C noted  TSH normal          BRENDAN (acute kidney injury)  Creatinine 1.7 with baseline 1.0-1.3  Cont IV fluids      Afib  New onset Afib  Consult cardiology      Diabetic ketoacidosis without coma associated with type 2 diabetes mellitus  Type 2 diabetes mellitus with hyperglycemia, with long-term current use of insulin    Takes metformin, lantus 28u nightly, and novolog 17u with meals--hold home meds  A1C 12.3  -cont IV fluids  -insulin drip  -hourly accuchecks  -NPO  -start detemir 14u nightly if able to swallow  -BMP q4h  -transition off insulin when gap closes, start diabetic diet          Coronary artery disease involving native coronary artery of native heart with unstable angina pectoris  Cont ASA, statin, brilinta  Recent stent placement      Type 2 diabetes mellitus with hyperglycemia, with long-term current use of insulin  See DKA      Primary hypertension  Hold losartan and cardizem   Permissive HTN          VTE Risk Mitigation (From admission, onward)         Ordered     enoxaparin injection 40 mg  Daily         01/12/22 1608     IP VTE HIGH RISK PATIENT  Once         01/12/22 1608     Place sequential compression device  Until discontinued         01/12/22  1608     Place sequential compression device  Until discontinued         01/12/22 1551     Place sequential compression device  Until discontinued         01/12/22 1551              Critical care time spent on the evaluation and treatment of severe organ dysfunction, review of pertinent labs and imaging studies, discussions with consulting providers and discussions with patient/family: 90 minutes.     Nyla Tripp NP  Department of [facility] Medicine   [place] - Intensive Care

## 2022-01-13 NOTE — HPI
Kamar Muñoz is a 75 yo male with a pmh of HTN, STEMI with stent placement on 1/9/22, DM2. He presented today with stroke symptoms x 1 day. He was noted to have a left  facial droop and slurred speech at home since yesterday morning after discharging from [place] [facility] with coronary stent placement. He resisted returning to the hospital yesterday. The patient states he had not started taking the brilinta yet because he did not know how it would interact with his other medications. He says he fell last night by tripping on a rug and hit his right arm and leg, denies hitting head. He denies any symptoms and states he cannot hear that he has slurred speech. He was also found to be in DKA on arrival. He does not recall if he took his insulin at home last night. He was evaluated by neurovascular provider after head CT showed subacute embolic strokes. EKG with Afib. She recommended continued DAPT, MRI/MRA brain imaging. Given a liter of IV fluids and started on insulin drip per ED provider.

## 2022-01-13 NOTE — ASSESSMENT & PLAN NOTE
-Stroke risk factor  -Recent STEMI 1/9/2022 with successful PCI and LAUREN  -Discharged on DAPT - ASA and ticagrelor with plans to continue x 1 year

## 2022-01-13 NOTE — PLAN OF CARE
Problem: SLP Goal  Goal: SLP Goal  Description: Short Term Goals:  1. Patient will successfully participate in rcepug-yyjuslav-rfhmoqtae evaluation to further assess for any communication impairments s/p stroke  ongoing 1/13  2. Pt will participate in ongoing swallow assessment to determine least restrictive diet. MET 1/13  3. Pt will tolerate regular tray/thin liquids with no audible s/s of dysphagia and good oral clearance x1 session  4. Further assess cognitive and writing skills next session to delineate high level cognitive goals.  5. Pt will complete simple oral motor ex to increase ROM and strength for speech production with min cues.  6. Pt will recall orientation info to year, date and time with use of external cues.  7. Educate pt and family on goals of SLP and deficits associated with R CVA in next session.       Outcome: Ongoing, Progressing   ST communication and swallowing eval initiated this date, no audible dysphagia signs present per clinical exam. 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 Pt reports he was indep prior to admit, states he takes care of wife at home who is disabled and uses walker. Pt exhibited mild dysarthric speech, is [place] but does not use hearing aids. Pt does exhibit recent memory deficits and some dcr'd safety awareness instances. SLP will need to further probe and corroborate with family regarding his baseline status. Pt will benefit from ST at [place].

## 2022-01-13 NOTE — ASSESSMENT & PLAN NOTE
- SBP 140s-170s  - discharged on BB, ARB and CCB; BB only continued likely due to need for permissive HTN  - resume full home medication regimen once cleared by Neurology  - monitor BP closely

## 2022-01-13 NOTE — EICU
EICU BRIEF ADMIT NOTE:    HISTORY:  CVA Please refer to H/P and ER notes for detail    CAMERA ASSESSMENT: Two way audiovisual assessment was done: Yes    Telemetry was reviewed. Medical records including notes, labs and imaging were reviewed.Yes    DISCUSSED with bedside nurse.No    ASSESSMENT AND PLAN:    # CVA: Monitor. Neurology evaluation. On Asa, Statin  # Recent cardiac intervention: Stent placement for MI  # Atrial fibrillation: Rate controlled. Cardiology evalation  # Ty  # DM with Hyperglycemia/DKA  # GI and DVT Prophylaxis  #   #     BEST PRACTICES REVIEW:    INTUBATED:  NO.   GLYCEMIN CONTROL:  Diabetes: Yes    Insulin Infusion.  STRESS ULCER PROPHYLAXIS: H2 antagonist *  DVT PROPHYLAXIS:  Pharmacological  Thank You for allowing EICU to participate in the care of the patient. Please call as needed      Sarath Trent MD  EICU  Critical Care Medicine

## 2022-01-13 NOTE — PROGRESS NOTES
Notified LUIS Obando pt has not urinated since the last straight cath at around 2330. Bladder scan volume this am >346cc. Ordered to put lopez catheter for urinary retention.

## 2022-01-13 NOTE — NURSING
Pt d/c'ed off monitor. D/C instructions given to pt, states understanding. Pt is AAOx4, LEAL's Well, Strong x 4. VSS, SR noted on monitor before pt was taken off. On RA, o2 sats wnls. Piv d/c'ed as ordered for d/c home. Site wnl. Pt ambulated in hallway and in room w/o any diff's or complications.  Ambulated in room to bathroom, dressed self independently for d/c home. No c/o pain/cp/sob. Off unit via wheelchair with transport for d/c home.

## 2022-01-13 NOTE — PROGRESS NOTES
Report received from SOY Stephenson in the ED. Room prior was occupied by covid+ pt and room needs to cleaned and zapped. Stated will notify as soon room is ready.

## 2022-01-13 NOTE — ASSESSMENT & PLAN NOTE
Presents with left facial droop and dysarthria x 1 day  CTA head with subacute infarcts s/p stent placement  Cont DAPT, statin  MRI brain  MRA brain and neck  Echo with bubble  Consult vascular neurology  Consult PT/OT/ST  Recent lipids, A1C noted  TSH normal

## 2022-01-14 ENCOUNTER — PATIENT OUTREACH (OUTPATIENT)
Dept: ADMINISTRATIVE | Facility: OTHER | Age: 77
End: 2022-01-14
Payer: MEDICARE

## 2022-01-14 VITALS
WEIGHT: 198.44 LBS | DIASTOLIC BLOOD PRESSURE: 86 MMHG | SYSTOLIC BLOOD PRESSURE: 169 MMHG | OXYGEN SATURATION: 92 % | HEART RATE: 85 BPM | TEMPERATURE: 99 F | RESPIRATION RATE: 18 BRPM | HEIGHT: 73 IN | BODY MASS INDEX: 26.3 KG/M2

## 2022-01-14 LAB
ANION GAP SERPL CALC-SCNC: 11 MMOL/L (ref 8–16)
ANION GAP SERPL CALC-SCNC: 12 MMOL/L (ref 8–16)
ANION GAP SERPL CALC-SCNC: 14 MMOL/L (ref 8–16)
BASOPHILS # BLD AUTO: 0.02 K/UL (ref 0–0.2)
BASOPHILS NFR BLD: 0.2 % (ref 0–1.9)
BUN SERPL-MCNC: 21 MG/DL (ref 8–23)
BUN SERPL-MCNC: 22 MG/DL (ref 8–23)
BUN SERPL-MCNC: 26 MG/DL (ref 8–23)
BUN SERPL-MCNC: 26 MG/DL (ref 8–23)
BUN SERPL-MCNC: 32 MG/DL (ref 8–23)
CALCIUM SERPL-MCNC: 7.8 MG/DL (ref 8.7–10.5)
CALCIUM SERPL-MCNC: 8 MG/DL (ref 8.7–10.5)
CALCIUM SERPL-MCNC: 8.1 MG/DL (ref 8.7–10.5)
CALCIUM SERPL-MCNC: 8.1 MG/DL (ref 8.7–10.5)
CALCIUM SERPL-MCNC: 8.2 MG/DL (ref 8.7–10.5)
CHLORIDE SERPL-SCNC: 105 MMOL/L (ref 95–110)
CHLORIDE SERPL-SCNC: 105 MMOL/L (ref 95–110)
CHLORIDE SERPL-SCNC: 106 MMOL/L (ref 95–110)
CHLORIDE SERPL-SCNC: 107 MMOL/L (ref 95–110)
CHLORIDE SERPL-SCNC: 108 MMOL/L (ref 95–110)
CO2 SERPL-SCNC: 19 MMOL/L (ref 23–29)
CO2 SERPL-SCNC: 20 MMOL/L (ref 23–29)
CO2 SERPL-SCNC: 22 MMOL/L (ref 23–29)
CO2 SERPL-SCNC: 22 MMOL/L (ref 23–29)
CO2 SERPL-SCNC: 24 MMOL/L (ref 23–29)
CREAT SERPL-MCNC: 0.9 MG/DL (ref 0.5–1.4)
CREAT SERPL-MCNC: 1 MG/DL (ref 0.5–1.4)
CREAT SERPL-MCNC: 1.2 MG/DL (ref 0.5–1.4)
DIFFERENTIAL METHOD: ABNORMAL
EOSINOPHIL # BLD AUTO: 0.2 K/UL (ref 0–0.5)
EOSINOPHIL NFR BLD: 2.1 % (ref 0–8)
ERYTHROCYTE [DISTWIDTH] IN BLOOD BY AUTOMATED COUNT: 12.5 % (ref 11.5–14.5)
EST. GFR  (AFRICAN AMERICAN): >60 ML/MIN/1.73 M^2
EST. GFR  (NON AFRICAN AMERICAN): 58 ML/MIN/1.73 M^2
EST. GFR  (NON AFRICAN AMERICAN): >60 ML/MIN/1.73 M^2
GLUCOSE SERPL-MCNC: 170 MG/DL (ref 70–110)
GLUCOSE SERPL-MCNC: 59 MG/DL (ref 70–110)
GLUCOSE SERPL-MCNC: 66 MG/DL (ref 70–110)
GLUCOSE SERPL-MCNC: 80 MG/DL (ref 70–110)
GLUCOSE SERPL-MCNC: 96 MG/DL (ref 70–110)
HCT VFR BLD AUTO: 38 % (ref 40–54)
HGB BLD-MCNC: 12.5 G/DL (ref 14–18)
IMM GRANULOCYTES # BLD AUTO: 0.05 K/UL (ref 0–0.04)
IMM GRANULOCYTES NFR BLD AUTO: 0.5 % (ref 0–0.5)
LYMPHOCYTES # BLD AUTO: 2.1 K/UL (ref 1–4.8)
LYMPHOCYTES NFR BLD: 19.2 % (ref 18–48)
MCH RBC QN AUTO: 28.3 PG (ref 27–31)
MCHC RBC AUTO-ENTMCNC: 32.9 G/DL (ref 32–36)
MCV RBC AUTO: 86 FL (ref 82–98)
MONOCYTES # BLD AUTO: 1.1 K/UL (ref 0.3–1)
MONOCYTES NFR BLD: 10 % (ref 4–15)
NEUTROPHILS # BLD AUTO: 7.3 K/UL (ref 1.8–7.7)
NEUTROPHILS NFR BLD: 68 % (ref 38–73)
NRBC BLD-RTO: 0 /100 WBC
PLATELET # BLD AUTO: 218 K/UL (ref 150–450)
PMV BLD AUTO: 9.9 FL (ref 9.2–12.9)
POCT GLUCOSE: 111 MG/DL (ref 70–110)
POCT GLUCOSE: 82 MG/DL (ref 70–110)
POCT GLUCOSE: 83 MG/DL (ref 70–110)
POTASSIUM SERPL-SCNC: 3.5 MMOL/L (ref 3.5–5.1)
POTASSIUM SERPL-SCNC: 3.6 MMOL/L (ref 3.5–5.1)
POTASSIUM SERPL-SCNC: 3.7 MMOL/L (ref 3.5–5.1)
POTASSIUM SERPL-SCNC: 3.8 MMOL/L (ref 3.5–5.1)
POTASSIUM SERPL-SCNC: 3.9 MMOL/L (ref 3.5–5.1)
RBC # BLD AUTO: 4.42 M/UL (ref 4.6–6.2)
SODIUM SERPL-SCNC: 138 MMOL/L (ref 136–145)
SODIUM SERPL-SCNC: 138 MMOL/L (ref 136–145)
SODIUM SERPL-SCNC: 140 MMOL/L (ref 136–145)
SODIUM SERPL-SCNC: 140 MMOL/L (ref 136–145)
SODIUM SERPL-SCNC: 141 MMOL/L (ref 136–145)
WBC # BLD AUTO: 10.74 K/UL (ref 3.9–12.7)

## 2022-01-14 PROCEDURE — 25000003 PHARM REV CODE 250: Performed by: FAMILY MEDICINE

## 2022-01-14 PROCEDURE — 99233 SBSQ HOSP IP/OBS HIGH 50: CPT | Mod: ,,, | Performed by: INTERNAL MEDICINE

## 2022-01-14 PROCEDURE — 80048 BASIC METABOLIC PNL TOTAL CA: CPT | Mod: 91 | Performed by: NURSE PRACTITIONER

## 2022-01-14 PROCEDURE — 25000003 PHARM REV CODE 250: Performed by: NURSE PRACTITIONER

## 2022-01-14 PROCEDURE — 93010 EKG 12-LEAD: ICD-10-PCS | Mod: ,,, | Performed by: INTERNAL MEDICINE

## 2022-01-14 PROCEDURE — 93010 ELECTROCARDIOGRAM REPORT: CPT | Mod: ,,, | Performed by: INTERNAL MEDICINE

## 2022-01-14 PROCEDURE — 94761 N-INVAS EAR/PLS OXIMETRY MLT: CPT

## 2022-01-14 PROCEDURE — 92507 TX SP LANG VOICE COMM INDIV: CPT

## 2022-01-14 PROCEDURE — 99233 SBSQ HOSP IP/OBS HIGH 50: CPT | Mod: ,,, | Performed by: NURSE PRACTITIONER

## 2022-01-14 PROCEDURE — 93005 ELECTROCARDIOGRAM TRACING: CPT

## 2022-01-14 PROCEDURE — 99233 PR SUBSEQUENT HOSPITAL CARE,LEVL III: ICD-10-PCS | Mod: ,,, | Performed by: INTERNAL MEDICINE

## 2022-01-14 PROCEDURE — 85025 COMPLETE CBC W/AUTO DIFF WBC: CPT | Performed by: NURSE PRACTITIONER

## 2022-01-14 PROCEDURE — 27000221 HC OXYGEN, UP TO 24 HOURS

## 2022-01-14 PROCEDURE — 99900035 HC TECH TIME PER 15 MIN (STAT)

## 2022-01-14 PROCEDURE — 99233 PR SUBSEQUENT HOSPITAL CARE,LEVL III: ICD-10-PCS | Mod: ,,, | Performed by: NURSE PRACTITIONER

## 2022-01-14 PROCEDURE — 36415 COLL VENOUS BLD VENIPUNCTURE: CPT | Performed by: NURSE PRACTITIONER

## 2022-01-14 RX ORDER — PANTOPRAZOLE SODIUM 40 MG/1
40 TABLET, DELAYED RELEASE ORAL DAILY
Qty: 30 TABLET | Refills: 11 | Status: SHIPPED | OUTPATIENT
Start: 2022-01-14 | End: 2023-03-24

## 2022-01-14 RX ORDER — AMIODARONE HYDROCHLORIDE 200 MG/1
200 TABLET ORAL DAILY
Status: DISCONTINUED | OUTPATIENT
Start: 2022-01-28 | End: 2022-01-14 | Stop reason: HOSPADM

## 2022-01-14 RX ORDER — AMIODARONE HYDROCHLORIDE 200 MG/1
400 TABLET ORAL 2 TIMES DAILY
Status: DISCONTINUED | OUTPATIENT
Start: 2022-01-14 | End: 2022-01-14 | Stop reason: HOSPADM

## 2022-01-14 RX ORDER — LOSARTAN POTASSIUM 25 MG/1
25 TABLET ORAL DAILY
Status: DISCONTINUED | OUTPATIENT
Start: 2022-01-14 | End: 2022-01-14 | Stop reason: HOSPADM

## 2022-01-14 RX ORDER — AMIODARONE HYDROCHLORIDE 200 MG/1
TABLET ORAL
Qty: 90 TABLET | Refills: 3 | Status: ON HOLD | OUTPATIENT
Start: 2022-01-14 | End: 2022-02-02 | Stop reason: SDUPTHER

## 2022-01-14 RX ORDER — DILTIAZEM HYDROCHLORIDE 120 MG/1
120 CAPSULE, COATED, EXTENDED RELEASE ORAL DAILY
Status: DISCONTINUED | OUTPATIENT
Start: 2022-01-14 | End: 2022-01-14 | Stop reason: HOSPADM

## 2022-01-14 RX ORDER — AMIODARONE HYDROCHLORIDE 200 MG/1
400 TABLET ORAL DAILY
Status: DISCONTINUED | OUTPATIENT
Start: 2022-01-21 | End: 2022-01-14 | Stop reason: HOSPADM

## 2022-01-14 RX ORDER — CLOPIDOGREL BISULFATE 75 MG/1
75 TABLET ORAL DAILY
Qty: 30 TABLET | Refills: 11 | Status: SHIPPED | OUTPATIENT
Start: 2022-01-14 | End: 2022-09-02

## 2022-01-14 RX ADMIN — DILTIAZEM HYDROCHLORIDE 120 MG: 120 CAPSULE, COATED, EXTENDED RELEASE ORAL at 08:01

## 2022-01-14 RX ADMIN — PANTOPRAZOLE SODIUM 40 MG: 40 TABLET, DELAYED RELEASE ORAL at 08:01

## 2022-01-14 RX ADMIN — ASPIRIN 81 MG: 81 TABLET, COATED ORAL at 08:01

## 2022-01-14 RX ADMIN — ATORVASTATIN CALCIUM 40 MG: 40 TABLET, FILM COATED ORAL at 08:01

## 2022-01-14 RX ADMIN — MUPIROCIN: 20 OINTMENT TOPICAL at 08:01

## 2022-01-14 RX ADMIN — CLOPIDOGREL 75 MG: 75 TABLET, FILM COATED ORAL at 08:01

## 2022-01-14 RX ADMIN — DEXTROSE MONOHYDRATE 12.5 G: 500 INJECTION PARENTERAL at 05:01

## 2022-01-14 RX ADMIN — AMIODARONE HYDROCHLORIDE 400 MG: 200 TABLET ORAL at 02:01

## 2022-01-14 RX ADMIN — LOSARTAN POTASSIUM 25 MG: 25 TABLET, FILM COATED ORAL at 08:01

## 2022-01-14 RX ADMIN — METOPROLOL SUCCINATE 25 MG: 25 TABLET, EXTENDED RELEASE ORAL at 08:01

## 2022-01-14 NOTE — PLAN OF CARE
Sw met with pt to discuss d/c planning. Pt lives with pt's wife Aimee [phone]. Pt has no DME and drives himself to doctor appointments. Pt was independent prior to admit. Pt's wife Aimee will transport pt home at the time of d/c. Sw encouraged pt to call with any questions or concerns. Sw will continue to follow pt for d/c planning.     Future Appointments   Date Time Provider Department Center   1/19/2022  2:40 PM Brittani Weems MD [facility] MED [facility]   1/31/2022  2:30 PM CARDIAC, PET IMAGING [facility]   2/2/2022  1:40 PM Constantine Seay MD [facility] CARD[facility]   4/6/2022  1:00 PM Basim Guerrero MD [facility] [email or web address] MED [facility]         01/14/22 0847   Discharge Assessment   Assessment Type Discharge Planning Assessment   Confirmed/corrected address, phone number and insurance Yes   Confirmed Demographics Correct on Facesheet   Source of Information patient   Lives With spouse   Facility Arrived From: home   Do you expect to return to your current living situation? Yes   Do you have help at home or someone to help you manage your care at home? Yes   Who are your caregiver(s) and their phone number(s)? pt's wife Aimee [phone]   Prior to hospitilization cognitive status: Alert/Oriented   Current cognitive status: Alert/Oriented   Walking or Climbing Stairs Difficulty none   Dressing/Bathing Difficulty none   Equipment Currently Used at Home none   Readmission within 30 days? No   Patient currently being followed by outpatient case management? No   Do you have prescription coverage? Yes   Coverage [employer]   Do you have any problems affording any of your prescribed medications? No   Who is going to help you get home at discharge? pt's wife Aimee [phone]   How do you get to doctors appointments? car, drives self   Discharge Plan A Home with family   DME Needed Upon Discharge  none   Discharge Plan discussed with: Patient   Discharge Barriers Identified None    Relationship/Environment   Name(s) of Who Lives With Patient pt's wife Aimee [phone]

## 2022-01-14 NOTE — ASSESSMENT & PLAN NOTE
- SBP 130s-170s  - continue BB, ARB and CCB once okay with Neurology from permissive HTN standpoint

## 2022-01-14 NOTE — PROGRESS NOTES
IP Liaison - Initial Visit Note    Patient: Kamar Muñoz  MRN:  [id number]  Date of Service:  1/14/2022  Completed by:  PAUL Rodriguez    Reason for Visit   Patient presents with    IP Liaison Initial Visit       RSW contacted pt via room phone in order to complete SDOH questionnaire and liaison assessment.  Pt has identified no immediate social barriers to care. RSW unable to complete full assessment due to pt falling asleep during questions.     The following were addressed during this visit:  - Review SDOH Questions   - Complete initial visit with patient        Patient Summary     IP Liaison Patient Assessment    General  Level of Caregiver support: Member independent and does not need caregiver assistance  Have you had to make a decision between paying for any of the following in the last 2 months?: None  Transportation means: Self  Assessments  Was the PHQ Depression Screening completed this visit?: No  Was the DHAVAL-7 Screening completed this visit?: No         PAUL Rodriguez

## 2022-01-14 NOTE — NURSING
Pt transferred to room 519 per bed. Bed alarm on for safety. VS wnl sat 97 percent on 2 liters. Pt had reported he felt sob when Charge nurse came in to see him. Pt RR 24 in no acute distress.

## 2022-01-14 NOTE — PLAN OF CARE
[facility] Discharge Instructions:    Silvia Wagner is a 2 day old  infant, delivered at Gestational Age: 40w5d.    discharged to home    Date and time of Delivery: 2017 12:40 AM     Delivery Method: Vaginal, Spontaneous Delivery [250]    Feeding method: Natural Human Milk  Infant Blood Type: B POSITIVE  Bilirubin   TOTAL BILIRUBIN (mg/dL)   Date Value   2017       Screen done: Done   Immunizations:   Most Recent Immunizations   Administered Date(s) Administered   • Hepatitis B Child 2017   Pended Date(s) Pended   • Hepatitis B Immune Globulin 2017       Hearing Test Machine: Auditory Brainstem Response (Algo) (17 0535)   Hearing Test Results: Pass R;Pass L (17 0535)  Birth Weight: 6 lb 10 oz (3004 g)    Discharge weight: 6 lb 6.8 oz (2914 g).   Discharge Date: 2017    Bulb Syringe: use to suction nose or mouth  Umbilical Cord: it is not necessary to treat the umbilical cord with alcohol.  Keep cord area clean and allow to air dry. Fold the diaper below the cord. The umbilical cord should fall off in approximately 1-4 weeks and there may be a small amount of bleeding as this occurs.  Vaginal Discharge: wipe girls from front to back. White or pink discharge is normal.  Bathing: sponge bathe until cord is off. Complete bath twice a week. Use mild soap.  Dressing: dress baby as you would dress for comfort and weather.  Formula Preparation:  Wash bottles and nipples in hot, soapy water, rinse well and air dry. If you question your water's purity, have it tested.  Never heat formula in a microwave, as it may heat unevenly and cause burns. Discard remaining formula in bottle after baby feeds.  Feedings: feed baby on demand.  Schedule may vary from day to day.   -Formula fed babies usually feed every 3-4 hours.  Do not force baby to finish  the bottle at every feeding. Do not put the baby to bed with a bottle in his/her  mouth.   -Breast fed babies  D/c orders noted, no DME, no HH    Sw met with pt with discuss discharge plans. Sw informed pt of upcoming follow up appointments. Pt verbalized understanding of all. Pt's wife Aimee [phone] will transport pt home a the time of discharge.     Pt is cleared to go from CM standpoint.     Future Appointments   Date Time Provider Department Center   1/19/2022  2:40 PM Brittani Weems MD [facility] MED [place]   1/31/2022  2:30 PM CARDIAC, PET IMAGING [facility]   2/2/2022  1:40 PM Constantine Seay MD [facility] CARDIO [place] [facility]   4/6/2022  1:00 PM Basim Guerrero MD [facility] MED [place]         01/14/22 0858   Final Note   Assessment Type Final Discharge Note   Anticipated Discharge Disposition Home   What phone number can be called within the next 1-3 days to see how you are doing after discharge? [phone]   Hospital Resources/Appts/Education Provided Appointments scheduled by Navigator/Coordinator   Post-Acute Status   Coverage [employer]   Discharge Delays None known at this time      regroup. You don't have to go so far that you can't hear your baby cry. Stepping into a nearby room or hallway can help reduce your frustration. Check on the baby every 5 to 10 minutes.    Tips to prevent infant falls:  Accidental infant falls can happen; the key to avoid a fall is prevention.   Remember as you are recovering the medications you may be taking may make you more tired.  Keep this in mind when holding your baby.  If you are feeling sleepy or plan on sleeping, place your baby in a safe place such as their crib or bassinet.  Co-sleeping/Co-bedding with your infant is not recommended.  Babies wiggle, move, and push against things with their feet.  Even these early movements can result in a fall.  Do not leave your baby alone on a changing table, bed, sofa, or chair.  If you cannot hold your baby put them in a safe place such as their crib or playpen.  If you have other children please be cautious and assist your older children while they are holding baby.  If there are any safety concerns following discharge, please contact your infants doctor.      If you have any questions about your baby, please call your baby's doctor.  Do not give your baby any medications unless directed by your Pediatrician    Call the doctor if:  · Fever of 100.4 degrees F or above  · Forceful vomiting (not spitting up)  · Several feedings when infant does not suck  · Poor feeding or listlessness  · Watery, runny stools (with mucous, blood or foul odor)  · No stool (bowel movement) for 48 hours  · Has less than 6 wet diapers in a 24 hour period of time   · Any unusual rash - or rash that is draining or causing the baby discomfort  · Yellow color of the skin or eyes  · Redness, drainage or foul odor from the umbilical cord  · Constant crying  · Unusual irritability or rigidity, especially when being held  · Difficulty breathing  · Infant injury (fall from bed or table, dropped or severely shaken, or any other  Anastasia (Breastfeeding help):[phone]    

## 2022-01-14 NOTE — PROGRESS NOTES
Attempted to reach out to pt twice via room phone and spouse cell no answer to inform upcoming discharge.

## 2022-01-14 NOTE — PLAN OF CARE
[facility]    FACILITY TRANSFER ORDERS      Patient Name: Kamar Muñoz  YOB: 1945    PCP: Basim Guerrero MD   PCP Address: [address] [place] [address] / [place] [id number]  PCP Phone Number: [phone]  PCP Fax: [phone]    Encounter Date: 01/14/2022    Admit to: Rehab    Vital Signs:  Routine    Diagnoses:   Active Hospital Problems    Diagnosis  POA    *Embolic stroke involving right middle cerebral artery [I63.411]  Yes    Embolic stroke involving left cerebellar artery [I63.442]  Yes    Dysarthria and anarthria [R47.1]  Yes    Diabetic ketoacidosis without coma associated with type 2 diabetes mellitus [E11.10]  Yes    Afib [I48.91]  Yes    BRENDAN (acute kidney injury) [N17.9]  Yes    Coronary artery disease involving native coronary artery of native heart with unstable angina pectoris [I25.110]  Yes    Type 2 diabetes mellitus with hyperglycemia, with long-term current use of insulin [E11.65, Z79.4]  Not Applicable    Primary hypertension [I10]  Yes      Resolved Hospital Problems   No resolved problems to display.       Allergies:  Review of patient's allergies indicates:   Allergen Reactions    Iodine      Other reaction(s): swelling  Other reaction(s): Itching  Other reaction(s): Rash       Diet: cardiac diet    Activities: Activity as tolerated    Nursing: per facility protocol     CONSULTS:    Physical Therapy to evaluate and treat.  and Occupational Therapy to evaluate and treat.      Medications: Review discharge medications with patient and family and provide education.      Current Discharge Medication List      START taking these medications    Details   amiodarone (PACERONE) 200 MG Tab Take 2 tablet (400mg) by mouth twice a day for 7 days then 2 tablets (400mg) daily x 7 days then 1 tablet 200mg daily thereafter  Qty: 90 tablet, Refills: 3      apixaban (ELIQUIS) 5 mg Tab Take 1 tablet (5 mg total) by mouth 2 (two) times daily.  Qty: 60 tablet, Refills: 5       clopidogreL (PLAVIX) 75 mg tablet Take 1 tablet (75 mg total) by mouth once daily.  Qty: 30 tablet, Refills: 11      pantoprazole (PROTONIX) 40 MG tablet Take 1 tablet (40 mg total) by mouth once daily.  Qty: 30 tablet, Refills: 11         CONTINUE these medications which have NOT CHANGED    Details   aspirin (ECOTRIN) 81 MG EC tablet Take 81 mg by mouth once daily.      atorvastatin (LIPITOR) 40 MG tablet Take 1 tablet (40 mg total) by mouth once daily.  Qty: 90 tablet, Refills: 3      diltiaZEM (CARDIZEM CD) 120 MG Cp24 Take 1 capsule (120 mg total) by mouth once daily.  Qty: 90 capsule, Refills: 3    Comments: **Patient requests 90 days supply**  Associated Diagnoses: Essential hypertension      gabapentin (NEURONTIN) 300 MG capsule Take 1 capsule (300 mg total) by mouth 2 (two) times daily.  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  Qty: 45.9 mL, Refills: 3    Associated Diagnoses: Type 2 diabetes mellitus with hyperglycemia, with long-term current use of insulin      insulin glargine (LANTUS) 100 unit/mL injection Inject 28 Units into the skin every evening.  Qty: 25.2 mL, Refills: 3    Associated Diagnoses: Type 2 diabetes mellitus with hyperglycemia, with long-term current use of insulin      losartan (COZAAR) 25 MG tablet Take 1 tablet (25 mg total) by mouth once daily.  Qty: 90 tablet, Refills: 3    Comments: .  Associated Diagnoses: Primary hypertension      metFORMIN (GLUCOPHAGE) 500 MG tablet Take 1 tablet (500 mg total) by mouth 2 (two) times daily with meals.  Qty: 180 tablet, Refills: 3    Associated Diagnoses: Type 2 diabetes mellitus with hyperglycemia, with long-term current use of insulin; Type 2 diabetes mellitus with stage 3 chronic kidney disease, with long-term current use of insulin     "  metoprolol succinate (TOPROL-XL) 25 MG 24 hr tablet Take 1 tablet (25 mg total) by mouth once daily.  Qty: 30 tablet, Refills: 11    Comments: .      BD ULTRA-FINE SHORT PEN NEEDLE 31 gauge x 5/16" Ndle 3 (three) times daily.      blood sugar diagnostic Strp 1 strip by Misc.(Non-Drug; Combo Route) route 2 (two) times a day.  Qty: 200 strip, Refills: 6    Associated Diagnoses: Type 2 diabetes mellitus with diabetic peripheral angiopathy without gangrene, with long-term current use of insulin      diclofenac sodium (VOLTAREN) 1 % Gel APPLY 2 GRAMS TO AFFECTED AREA ONCE D      ergocalciferol (ERGOCALCIFEROL) 50,000 unit Cap       glucagon, human recombinant, (GLUCAGON EMERGENCY KIT, HUMAN,) 1 mg SolR Inject 1 mg into the muscle as needed.  Qty: 1 each, Refills: 0      hydrocortisone 2.5 % cream APPLY TO GROIN RASH BID NO LONGER THAN 7 DAYS      ketoconazole (NIZORAL) 2 % cream APPLY TO GROIN RASH BID NO LONGER THAN 7 DAYS      lancets (ONETOUCH ULTRASOFT LANCETS) Misc 1 lancet by Misc.(Non-Drug; Combo Route) route 2 (two) times a day.  Qty: 200 each, Refills: 6    Associated Diagnoses: Type 2 diabetes mellitus with diabetic peripheral angiopathy without gangrene, with long-term current use of insulin      MULTIVITAMIN ORAL Take 1 tablet by mouth once daily.       nitroGLYCERIN (NITROSTAT) 0.4 MG SL tablet Place 1 tablet (0.4 mg total) under the tongue every 5 (five) minutes as needed for Chest pain.  Qty: 25 tablet, Refills: 11      pen needle, diabetic (PEN NEEDLE) 30 gauge x 5/16" Ndle 1 Units by Misc.(Non-Drug; Combo Route) route 3 (three) times daily.  Qty: 100 each, Refills: 3    Associated Diagnoses: Type 2 diabetes mellitus with hyperglycemia, with long-term current use of insulin; Type 2 diabetes mellitus with stage 3 chronic kidney disease, with long-term current use of insulin      polycarbophil (FIBERCON) 625 mg tablet Take 1 tablet by mouth once daily.          STOP taking these medications       " OMEPRAZOLE (PRILOSEC ORAL) Comments:   Reason for Stopping:         ticagrelor (BRILINTA) 90 mg tablet Comments:   Reason for Stopping:                  Immunizations Administered as of 1/14/2022     No immunizations on file.          This patient has had both covid vaccinations    Some patients may experience side effects after vaccination.  These may include fever, headache, muscle or joint aches.  Most symptoms resolve with 24-48 hours and do not require urgent medical evaluation unless they persist for more than 72 hours or symptoms are concerning for an unrelated medical condition.          _________________________________  Heather Vitale MD  01/14/2022

## 2022-01-14 NOTE — PLAN OF CARE
VN note: VN completed AVS and attachments and notified bedside nurse, Qamar. Will cont to be available and intervene prn.

## 2022-01-14 NOTE — ASSESSMENT & PLAN NOTE
Dysarthria and anarthria  Presents with left facial droop and dysarthria x 1 day  CTA head with subacute infarcts s/p stent placement  Cont DAPT, statin  MRI brain  MRA brain and neck  Echo with bubble: There is no evidence of intracardiac shunting.  Consult vascular neurology: Continue DAPT, obtain MRI/MRA head/neck. Cerebellar stroke not large enough that significant mass effect anticipated, but maintain Na >140. Anticoagulants: Apixaban 5 mg BID - start 7 days post event due to size and presence of petechial hemorrhage.   Resume home BP meds. Lipitor 40 Consult PT/OT/ST  Recent lipids, A1C noted  TSH normal  Outpatient SLP FU

## 2022-01-14 NOTE — ASSESSMENT & PLAN NOTE
- new onset  - presented with afib with acute CVA on EKG; spontaneously converted and remains in NSR  - will place on Amiodarone in attempt to maintain NSR; continue BB; Eliquis to be started in about one week per Neuro recs

## 2022-01-14 NOTE — ASSESSMENT & PLAN NOTE
- recent STEMI with RCA LAUREN; LAD 70% medically managed with outpatient cardiac PET to guide revascularization  - no complaints of chest pain; was on DAPT with ASA and Brilinta; will change to Plavix given risk with DOAC use  - stressed importance of DAPT prior to previous discharge and stressed importance again this AM with daughter   - continue BB and statin

## 2022-01-14 NOTE — SUBJECTIVE & OBJECTIVE
Review of Systems   Constitutional: Negative for chills, decreased appetite, diaphoresis and fever.   Cardiovascular: Negative for chest pain, claudication, cyanosis, dyspnea on exertion, irregular heartbeat, leg swelling, near-syncope, orthopnea, palpitations, paroxysmal nocturnal dyspnea and syncope.   Respiratory: Negative for cough, hemoptysis, shortness of breath and wheezing.    Gastrointestinal: Negative for bloating, abdominal pain, constipation, diarrhea, melena, nausea and vomiting.   Neurological: Negative for dizziness and weakness.     Objective:     Vital Signs (Most Recent):  Temp: 96 °F (35.6 °C) (01/14/22 0714)  Pulse: 92 (01/14/22 0741)  Resp: 20 (01/14/22 0714)  BP: (!) 189/91 (01/14/22 0714)  SpO2: (!) 94 % (01/14/22 0741) Vital Signs (24h Range):  Temp:  [96 °F (35.6 °C)-99 °F (37.2 °C)] 96 °F (35.6 °C)  Pulse:  [] 92  Resp:  [10-32] 20  SpO2:  [90 %-100 %] 94 %  BP: (133-196)/(71-95) 189/91     Weight: 90 kg (198 lb 6.6 oz)  Body mass index is 26.18 kg/m².     SpO2: (!) 94 %  O2 Device (Oxygen Therapy): nasal cannula      Intake/Output Summary (Last 24 hours) at 1/14/2022 1055  Last data filed at 1/14/2022 0417  Gross per 24 hour   Intake 360 ml   Output 750 ml   Net -390 ml       Lines/Drains/Airways     Arterial Line            Arterial Line -- days          Peripheral Intravenous Line                 Peripheral IV - Single Lumen 01/12/22 1058 18 G Right Antecubital 1 day                Physical Exam  Constitutional:       General: He is not in acute distress.     Appearance: He is well-developed and well-nourished.   Neck:      Vascular: No JVD.   Cardiovascular:      Rate and Rhythm: Normal rate and regular rhythm. Occasional extrasystoles are present.     Heart sounds: No murmur heard.  No gallop.    Pulmonary:      Effort: Pulmonary effort is normal. No respiratory distress.      Breath sounds: Normal breath sounds. No wheezing.   Abdominal:      General: Bowel sounds are  normal. There is no distension.      Palpations: Abdomen is soft.      Tenderness: There is no abdominal tenderness.   Musculoskeletal:         General: No edema.      Cervical back: Normal range of motion and neck supple.   Skin:     General: Skin is warm and dry.   Neurological:      Mental Status: He is alert and oriented to person, place, and time.   Psychiatric:         Mood and Affect: Mood and affect normal.         Behavior: Behavior normal.         Thought Content: Thought content normal.         Judgment: Judgment normal.         Significant Labs:   BMP:   Recent Labs   Lab 01/13/22  0358 01/13/22  0815 01/13/22  2340 01/14/22  0352 01/14/22  0755   *  250*   < > 170* 59* 66*     142   < > 141 140 140   K 4.7  4.5   < > 3.8 3.5 3.6     109   < > 106 108 107   CO2 18*  20*   < > 24 20* 22*   BUN 41*  41*   < > 32* 26* 26*   CREATININE 1.4  1.5*   < > 1.2 0.9 0.9   CALCIUM 8.0*  8.1*   < > 8.1* 7.8* 8.0*   MG 2.1  --   --   --   --     < > = values in this interval not displayed.   , CBC   Recent Labs   Lab 01/12/22  1057 01/12/22  1407 01/13/22  0359 01/13/22  0359 01/14/22  0352   WBC 10.72  --  14.69*  --  10.74   HGB 13.6*  --  12.6*  --  12.5*   HCT 41.3   < > 38.9*   < > 38.0*     --  237  --  218    < > = values in this interval not displayed.    and Troponin   Recent Labs   Lab 01/13/22  0358   TROPONINI 12.121*       Significant Imaging: Echocardiogram:   Transthoracic echo (TTE) complete ([facility] Only):   Results for orders placed or performed during the hospital encounter of 01/12/22   Echo Saline Bubble? Yes   Result Value Ref Range    BSA 2.16 m2    LVIDd 4.90 3.5 - 6.0 cm    IVS 0.90 (A) 0.6 - 1.1 cm    Posterior Wall 1.00 (A) 0.6 - 1.1 cm    LVIDs 2.10 (A) 2.1 - 4.0 cm    FS 57 28 - 44 %    LV mass 164.32 g    Left Ventricle Relative Wall Thickness 0.41 cm    LV Systolic Volume 75.37 mL    LV Systolic Volume Index 35.1 mL/m2    LV Diastolic Volume 135.48 mL     LV Diastolic Volume Index 63.01 mL/m2    LV Mass Index 76 g/m2    LA WIDTH 3.20 cm    EF 55 %    LA volume 52.03 cm3    LA size 3.70 cm    RVDD 2.50 cm    TAPSE 1.60 cm    Right ventricular length in diastole (apical 4-chamber view) 6.00 cm    LA Volume Index 24.2 mL/m2    RA Major Axis 4.40 cm    Left Atrium Minor Axis 4.60 cm    Left Atrium Major Axis 5.90 cm    RA Width 4.00 cm    Narrative    · There is no evidence of intracardiac shunting.  · The left ventricle is normal in size with normal systolic function.  · The estimated ejection fraction is 55%.  · Normal left ventricular diastolic function.  · Normal right ventricular size with normal right ventricular systolic   function.

## 2022-01-14 NOTE — PROGRESS NOTES
[place] [facility] S[facility]  Cardiology  Progress Note    Patient Name: Kamar Muñoz  MRN: [id number]  Admission Date: 1/12/2022  Hospital Length of Stay: 2 days  Code Status: Full Code   Attending Physician: Heather Vitale*   Primary Care Physician: Basim Guerrero MD  Expected Discharge Date: 1/14/2022  Principal Problem:Embolic stroke involving right middle cerebral artery    Subjective:     Hospital Course:   1/13/2022 Per HPI  1/14/2022 Transferred out of ICU yesterday. Converted to NSR. MRI with stroke. Neurology on board. CBC and BMP WNL. HR and BP stable overnight           Review of Systems   Constitutional: Negative for chills, decreased appetite, diaphoresis and fever.   Cardiovascular: Negative for chest pain, claudication, cyanosis, dyspnea on exertion, irregular heartbeat, leg swelling, near-syncope, orthopnea, palpitations, paroxysmal nocturnal dyspnea and syncope.   Respiratory: Negative for cough, hemoptysis, shortness of breath and wheezing.    Gastrointestinal: Negative for bloating, abdominal pain, constipation, diarrhea, melena, nausea and vomiting.   Neurological: Negative for dizziness and weakness.     Objective:     Vital Signs (Most Recent):  Temp: 96 °F (35.6 °C) (01/14/22 0714)  Pulse: 92 (01/14/22 0741)  Resp: 20 (01/14/22 0714)  BP: (!) 189/91 (01/14/22 0714)  SpO2: (!) 94 % (01/14/22 0741) Vital Signs (24h Range):  Temp:  [96 °F (35.6 °C)-99 °F (37.2 °C)] 96 °F (35.6 °C)  Pulse:  [] 92  Resp:  [10-32] 20  SpO2:  [90 %-100 %] 94 %  BP: (133-196)/(71-95) 189/91     Weight: 90 kg (198 lb 6.6 oz)  Body mass index is 26.18 kg/m².     SpO2: (!) 94 %  O2 Device (Oxygen Therapy): nasal cannula      Intake/Output Summary (Last 24 hours) at 1/14/2022 1055  Last data filed at 1/14/2022 0417  Gross per 24 hour   Intake 360 ml   Output 750 ml   Net -390 ml       Lines/Drains/Airways     Arterial Line            Arterial Line -- days          Peripheral Intravenous Line                  Peripheral IV - Single Lumen 01/12/22 1058 18 G Right Antecubital 1 day                Physical Exam  Constitutional:       General: He is not in acute distress.     Appearance: He is well-developed and well-nourished.   Neck:      Vascular: No JVD.   Cardiovascular:      Rate and Rhythm: Normal rate and regular rhythm. Occasional extrasystoles are present.     Heart sounds: No murmur heard.  No gallop.    Pulmonary:      Effort: Pulmonary effort is normal. No respiratory distress.      Breath sounds: Normal breath sounds. No wheezing.   Abdominal:      General: Bowel sounds are normal. There is no distension.      Palpations: Abdomen is soft.      Tenderness: There is no abdominal tenderness.   Musculoskeletal:         General: No edema.      Cervical back: Normal range of motion and neck supple.   Skin:     General: Skin is warm and dry.   Neurological:      Mental Status: He is alert and oriented to person, place, and time.   Psychiatric:         Mood and Affect: Mood and affect normal.         Behavior: Behavior normal.         Thought Content: Thought content normal.         Judgment: Judgment normal.         Significant Labs:   BMP:   Recent Labs   Lab 01/13/22  0358 01/13/22  0815 01/13/22  2340 01/14/22 0352 01/14/22  0755   *  250*   < > 170* 59* 66*     142   < > 141 140 140   K 4.7  4.5   < > 3.8 3.5 3.6     109   < > 106 108 107   CO2 18*  20*   < > 24 20* 22*   BUN 41*  41*   < > 32* 26* 26*   CREATININE 1.4  1.5*   < > 1.2 0.9 0.9   CALCIUM 8.0*  8.1*   < > 8.1* 7.8* 8.0*   MG 2.1  --   --   --   --     < > = values in this interval not displayed.   , CBC   Recent Labs   Lab 01/12/22  1057 01/12/22  1407 01/13/22  0359 01/13/22 0359 01/14/22  0352   WBC 10.72  --  14.69*  --  10.74   HGB 13.6*  --  12.6*  --  12.5*   HCT 41.3   < > 38.9*   < > 38.0*     --  237  --  218    < > = values in this interval not displayed.    and Troponin   Recent Labs   Lab  01/13/22  0358   TROPONINI 12.121*       Significant Imaging: Echocardiogram:   Transthoracic echo (TTE) complete ([facility] Only):   Results for orders placed or performed during the hospital encounter of 01/12/22   Echo Saline Bubble? Yes   Result Value Ref Range    BSA 2.16 m2    LVIDd 4.90 3.5 - 6.0 cm    IVS 0.90 (A) 0.6 - 1.1 cm    Posterior Wall 1.00 (A) 0.6 - 1.1 cm    LVIDs 2.10 (A) 2.1 - 4.0 cm    FS 57 28 - 44 %    LV mass 164.32 g    Left Ventricle Relative Wall Thickness 0.41 cm    LV Systolic Volume 75.37 mL    LV Systolic Volume Index 35.1 mL/m2    LV Diastolic Volume 135.48 mL    LV Diastolic Volume Index 63.01 mL/m2    LV Mass Index 76 g/m2    LA WIDTH 3.20 cm    EF 55 %    LA volume 52.03 cm3    LA size 3.70 cm    RVDD 2.50 cm    TAPSE 1.60 cm    Right ventricular length in diastole (apical 4-chamber view) 6.00 cm    LA Volume Index 24.2 mL/m2    RA Major Axis 4.40 cm    Left Atrium Minor Axis 4.60 cm    Left Atrium Major Axis 5.90 cm    RA Width 4.00 cm    Narrative    · There is no evidence of intracardiac shunting.  · The left ventricle is normal in size with normal systolic function.  · The estimated ejection fraction is 55%.  · Normal left ventricular diastolic function.  · Normal right ventricular size with normal right ventricular systolic   function.        Assessment and Plan:     Brief HPI: Seen on AM rounds while resting in bed with daughter at the bedside. Reviewed POC with daughter in regards to CAD, afib and CVA- verbalized understanding     Embolic stroke involving left cerebellar artery  - CT with acute CVA  - MRI with confirmation of CVA; MRA with left vertebral stenosis and <50% ICA stenosis  - medical management as detailed previously     Afib  - new onset  - presented with afib with acute CVA on EKG; spontaneously converted and remains in NSR  - will place on Amiodarone in attempt to maintain NSR; continue BB; Eliquis to be started in about one week per Neuro recs    Coronary artery  disease involving native coronary artery of native heart with unstable angina pectoris  - recent STEMI with RCA LAUREN; LAD 70% medically managed with outpatient cardiac PET to guide revascularization  - no complaints of chest pain; was on DAPT with ASA and Brilinta; will change to Plavix given risk with DOAC use  - stressed importance of DAPT prior to previous discharge and stressed importance again this AM with daughter   - continue BB and statin     Type 2 diabetes mellitus with hyperglycemia, with long-term current use of insulin  - HgbA1c 12 last admission  - DKA upon admission; management per primary team  - stressed importance of blood sugar control with patient on previous admission and again with daughter this morning     Primary hypertension  - SBP 130s-170s  - continue BB, ARB and CCB once okay with Neurology from permissive HTN standpoint           VTE Risk Mitigation (From admission, onward)         Ordered     enoxaparin injection 40 mg  Daily         01/12/22 1608     IP VTE HIGH RISK PATIENT  Once         01/12/22 1608     Place sequential compression device  Until discontinued         01/12/22 1608                HIRAL Sargent, ANP  Cardiology  [place] - Med Surg

## 2022-01-14 NOTE — PROGRESS NOTES
[facility] Medicine  Progress Note    Patient Name: Kamar Muñoz  MRN: [id number]  Patient Class: IP- Inpatient   Admission Date: 1/12/2022  Length of Stay: 2 days  Attending Physician: Heather Vitale*  Primary Care Provider: Basim Guerrero MD        Subjective:     Principal Problem:Embolic stroke involving right middle cerebral artery        HPI:  Kamar Muñoz is a 75 yo male with a pmh of HTN, STEMI with stent placement on 1/9/22, DM2. He presented today with stroke symptoms x 1 day. He was noted to have a left  facial droop and slurred speech at home since yesterday morning after discharging from [place] s/ STEMI with coronary stent placement. He resisted returning to the hospital yesterday. The patient states he had not started taking the brilinta yet because he did not know how it would interact with his other medications. He says he fell last night by tripping on a rug and hit his right arm and leg, denies hitting head. He denies any symptoms and states he cannot hear that he has slurred speech. He was also found to be in DKA on arrival. He does not recall if he took his insulin at home last night. He was evaluated by neurovascular provider after head CT showed subacute embolic strokes. EKG with Afib. She recommended continued DAPT, MRI/MRA brain imaging. Given a liter of IV fluids and started on insulin drip per ED provider.       Overview/Hospital Course:  No notes on file    Interval History:  awake and alert, no new complaint  Appreciate Neurology recs  TTE: There is no evidence of intracardiac shunting. Resume home BP meds.  Anticoagulants: Apixaban 5 mg BID - start 7 days post event due to size and presence of petechial hemorrhage.  Discharge with outpt SLP FU       Review of Systems   Constitutional: Negative for chills and fever.   HENT: Negative for congestion.    Eyes: Negative for visual disturbance.   Respiratory: Negative for cough and shortness of breath.     Cardiovascular: Negative for chest pain.   Gastrointestinal: Negative for abdominal pain and nausea.   Genitourinary: Negative for difficulty urinating and dysuria.   Musculoskeletal: Negative for arthralgias and myalgias.   Skin: Positive for wound (right arm abrasion).   Neurological: Positive for weakness. Negative for dizziness, speech difficulty, numbness and headaches.   Psychiatric/Behavioral: Negative for confusion.     Objective:     Vital Signs (Most Recent):  Temp: 96 °F (35.6 °C) (01/14/22 0714)  Pulse: 88 (01/14/22 0714)  Resp: 20 (01/14/22 0714)  BP: (!) 189/91 (01/14/22 0714)  SpO2: 98 % (01/14/22 0415) Vital Signs (24h Range):  Temp:  [96 °F (35.6 °C)-99 °F (37.2 °C)] 96 °F (35.6 °C)  Pulse:  [] 88  Resp:  [10-32] 20  SpO2:  [90 %-100 %] 98 %  BP: (133-196)/(69-95) 189/91     Weight: 90 kg (198 lb 6.6 oz)  Body mass index is 26.18 kg/m².    Intake/Output Summary (Last 24 hours) at 1/14/2022 0707  Last data filed at 1/14/2022 0417  Gross per 24 hour   Intake 360 ml   Output 750 ml   Net -390 ml      Physical Exam  Vitals and nursing note reviewed.   Constitutional:       General: He is not in acute distress.     Appearance: Normal appearance. He is ill-appearing (chronic).   HENT:      Head: Normocephalic and atraumatic.   Cardiovascular:      Rate and Rhythm: Normal rate. Rhythm irregular.      Pulses: Normal pulses.      Heart sounds: Normal heart sounds.   Pulmonary:      Effort: Pulmonary effort is normal.   Abdominal:      General: Bowel sounds are normal.      Palpations: Abdomen is soft.   Musculoskeletal:         General: No swelling. Normal range of motion.      Cervical back: Normal range of motion.   Skin:     General: Skin is warm and dry.      Comments: Abrasion to right arm   Neurological:      Mental Status: He is alert and oriented to person, place, and time.      Motor: Weakness present.      Comments:  mild dysarthria improved since admission- improving since admit, good  strength to all extremities, normal mentation on exam   Psychiatric:         Behavior: Behavior normal.         Thought Content: Thought content normal.         Significant Labs:   A1C:   Recent Labs   Lab 08/26/21  0855 12/29/21  0810   HGBA1C 10.9* 12.3*     ABGs:   Recent Labs   Lab 01/12/22  1407   PH 7.295*   PCO2 33.6*   HCO3 16.3*   POCSATURATED 57*   BE -10   PO2 33*     Blood Culture: No results for input(s): LABBLOO in the last 48 hours.  CBC:   Recent Labs   Lab 01/12/22  1057 01/12/22  1407 01/13/22  0359 01/14/22  0352   WBC 10.72  --  14.69* 10.74   HGB 13.6*  --  12.6* 12.5*   HCT 41.3 40 38.9* 38.0*     --  237 218     CMP:   Recent Labs   Lab 01/12/22  1057 01/12/22  1357 01/13/22  1949 01/13/22  2340 01/14/22  0352      < > 139 141 140   K 5.4*   < > 3.8 3.8 3.5      < > 107 106 108   CO2 16*   < > 22* 24 20*   *   < > 214* 170* 59*   BUN 48*   < > 33* 32* 26*   CREATININE 1.7*   < > 1.2 1.2 0.9   CALCIUM 8.9   < > 8.2* 8.1* 7.8*   PROT 7.2  --   --   --   --    ALBUMIN 3.5  --   --   --   --    BILITOT 1.7*  --   --   --   --    ALKPHOS 107  --   --   --   --    AST 28  --   --   --   --    ALT 29  --   --   --   --    ANIONGAP 21*   < > 10 11 12   EGFRNONAA 38*   < > 58* 58* >60    < > = values in this interval not displayed.     Lactic Acid:   Recent Labs   Lab 01/12/22  1938 01/12/22  2316 01/13/22 0358   LACTATE 3.9* 1.4 1.1     Lipase: No results for input(s): LIPASE in the last 48 hours.  Lipid Panel: No results for input(s): CHOL, HDL, LDLCALC, TRIG, CHOLHDL in the last 48 hours.  Magnesium:   Recent Labs   Lab 01/13/22  0358   MG 2.1     Troponin:   Recent Labs   Lab 01/13/22  0358   TROPONINI 12.121*     TSH:   Recent Labs   Lab 01/12/22  1057   TSH 0.509     Urine Culture: No results for input(s): LABURIN in the last 48 hours.  Urine Studies:   Recent Labs   Lab 01/13/22  0607   COLORU Yellow   APPEARANCEUA Clear   PHUR 6.0   SPECGRAV 1.025   PROTEINUA 1+*    GLUCUA 2+*   KETONESU 2+*   BILIRUBINUA Negative   OCCULTUA 1+*   NITRITE Negative   UROBILINOGEN Negative   LEUKOCYTESUR Negative   RBCUA 8*   WBCUA 7*   BACTERIA Rare   HYALINECASTS 0       Significant Imaging: I have reviewed all pertinent imaging results/findings within the past 24 hours.      Assessment/Plan:      * Embolic stroke involving right middle cerebral artery  Dysarthria and anarthria  Presents with left facial droop and dysarthria x 1 day  CTA head with subacute infarcts s/p stent placement  Cont DAPT, statin  MRI brain  MRA brain and neck  Echo with bubble: There is no evidence of intracardiac shunting.  Consult vascular neurology: Continue DAPT, obtain MRI/MRA head/neck. Cerebellar stroke not large enough that significant mass effect anticipated, but maintain Na >140. Anticoagulants: Apixaban 5 mg BID - start 7 days post event due to size and presence of petechial hemorrhage.   Resume home BP meds. Lipitor 40 Consult PT/OT/ST  Recent lipids, A1C noted  TSH normal  Outpatient SLP FU        BRENDAN (acute kidney injury)  Creatinine 1.7 with baseline 1.0-1.3  Cont IV fluids. D/c  discontinued      Afib  New onset Afib  Consult cardiology- appreciates      Diabetic ketoacidosis without coma associated with type 2 diabetes mellitus  Type 2 diabetes mellitus with hyperglycemia, with long-term current use of insulin    Takes metformin, lantus 28u nightly, and novolog 17u with meals--hold home meds  A1C 12.3  -cont IV fluids  -insulin drip  -hourly accuchecks  -NPO- cleared by speech  - continue detemir 14u nightly if able to swallow  -BMP q4h  -transition off insulin when gap closes, start diabetic diet          Coronary artery disease involving native coronary artery of native heart with unstable angina pectoris  Cont ASA, statin, brilinta  Recent stent placement      Type 2 diabetes mellitus with hyperglycemia, with long-term current use of insulin  See DKA      Primary hypertension  Hold losartan and  cardizem   Permissive HTN- resume home meds on 1/14          VTE Risk Mitigation (From admission, onward)         Ordered     enoxaparin injection 40 mg  Daily         01/12/22 1608     IP VTE HIGH RISK PATIENT  Once         01/12/22 1608     Place sequential compression device  Until discontinued         01/12/22 1608                Discharge Planning   ALLIE: 1/14/2022     Code Status: Full Code   Is the patient medically ready for discharge?:     Reason for patient still in hospital (select all that apply): Pending disposition  Discharge Plan A: Home with family   Discharge Delays: None known at this time        Heather Vitale MD  Department of Hospital Medicine   [place] - [facility] S[facility]

## 2022-01-14 NOTE — SUBJECTIVE & OBJECTIVE
Interval History:  awake and alert, no new complaint  Appreciate Neurology recs  TTE: There is no evidence of intracardiac shunting. Resume home BP meds.  Anticoagulants: Apixaban 5 mg BID - start 7 days post event due to size and presence of petechial hemorrhage.  Discharge with outpt SLP FU       Review of Systems   Constitutional: Negative for chills and fever.   HENT: Negative for congestion.    Eyes: Negative for visual disturbance.   Respiratory: Negative for cough and shortness of breath.    Cardiovascular: Negative for chest pain.   Gastrointestinal: Negative for abdominal pain and nausea.   Genitourinary: Negative for difficulty urinating and dysuria.   Musculoskeletal: Negative for arthralgias and myalgias.   Skin: Positive for wound (right arm abrasion).   Neurological: Positive for weakness. Negative for dizziness, speech difficulty, numbness and headaches.   Psychiatric/Behavioral: Negative for confusion.     Objective:     Vital Signs (Most Recent):  Temp: 96 °F (35.6 °C) (01/14/22 0714)  Pulse: 88 (01/14/22 0714)  Resp: 20 (01/14/22 0714)  BP: (!) 189/91 (01/14/22 0714)  SpO2: 98 % (01/14/22 0415) Vital Signs (24h Range):  Temp:  [96 °F (35.6 °C)-99 °F (37.2 °C)] 96 °F (35.6 °C)  Pulse:  [] 88  Resp:  [10-32] 20  SpO2:  [90 %-100 %] 98 %  BP: (133-196)/(69-95) 189/91     Weight: 90 kg (198 lb 6.6 oz)  Body mass index is 26.18 kg/m².    Intake/Output Summary (Last 24 hours) at 1/14/2022 0050  Last data filed at 1/14/2022 0417  Gross per 24 hour   Intake 360 ml   Output 750 ml   Net -390 ml      Physical Exam  Vitals and nursing note reviewed.   Constitutional:       General: He is not in acute distress.     Appearance: Normal appearance. He is ill-appearing (chronic).   HENT:      Head: Normocephalic and atraumatic.   Cardiovascular:      Rate and Rhythm: Normal rate. Rhythm irregular.      Pulses: Normal pulses.      Heart sounds: Normal heart sounds.   Pulmonary:      Effort: Pulmonary effort  is normal.   Abdominal:      General: Bowel sounds are normal.      Palpations: Abdomen is soft.   Musculoskeletal:         General: No swelling. Normal range of motion.      Cervical back: Normal range of motion.   Skin:     General: Skin is warm and dry.      Comments: Abrasion to right arm   Neurological:      Mental Status: He is alert and oriented to person, place, and time.      Motor: Weakness present.      Comments:  mild dysarthria improved since admission- improving since admit, good strength to all extremities, normal mentation on exam   Psychiatric:         Behavior: Behavior normal.         Thought Content: Thought content normal.         Significant Labs:   A1C:   Recent Labs   Lab 08/26/21  0855 12/29/21  0810   HGBA1C 10.9* 12.3*     ABGs:   Recent Labs   Lab 01/12/22  1407   PH 7.295*   PCO2 33.6*   HCO3 16.3*   POCSATURATED 57*   BE -10   PO2 33*     Blood Culture: No results for input(s): LABBLOO in the last 48 hours.  CBC:   Recent Labs   Lab 01/12/22  1057 01/12/22  1407 01/13/22  0359 01/14/22  0352   WBC 10.72  --  14.69* 10.74   HGB 13.6*  --  12.6* 12.5*   HCT 41.3 40 38.9* 38.0*     --  237 218     CMP:   Recent Labs   Lab 01/12/22  1057 01/12/22  1357 01/13/22  1949 01/13/22  2340 01/14/22  0352      < > 139 141 140   K 5.4*   < > 3.8 3.8 3.5      < > 107 106 108   CO2 16*   < > 22* 24 20*   *   < > 214* 170* 59*   BUN 48*   < > 33* 32* 26*   CREATININE 1.7*   < > 1.2 1.2 0.9   CALCIUM 8.9   < > 8.2* 8.1* 7.8*   PROT 7.2  --   --   --   --    ALBUMIN 3.5  --   --   --   --    BILITOT 1.7*  --   --   --   --    ALKPHOS 107  --   --   --   --    AST 28  --   --   --   --    ALT 29  --   --   --   --    ANIONGAP 21*   < > 10 11 12   EGFRNONAA 38*   < > 58* 58* >60    < > = values in this interval not displayed.     Lactic Acid:   Recent Labs   Lab 01/12/22  1938 01/12/22  2316 01/13/22  0358   LACTATE 3.9* 1.4 1.1     Lipase: No results for input(s): LIPASE in the  last 48 hours.  Lipid Panel: No results for input(s): CHOL, HDL, LDLCALC, TRIG, CHOLHDL in the last 48 hours.  Magnesium:   Recent Labs   Lab 01/13/22  0358   MG 2.1     Troponin:   Recent Labs   Lab 01/13/22  0358   TROPONINI 12.121*     TSH:   Recent Labs   Lab 01/12/22  1057   TSH 0.509     Urine Culture: No results for input(s): LABURIN in the last 48 hours.  Urine Studies:   Recent Labs   Lab 01/13/22  0607   COLORU Yellow   APPEARANCEUA Clear   PHUR 6.0   SPECGRAV 1.025   PROTEINUA 1+*   GLUCUA 2+*   KETONESU 2+*   BILIRUBINUA Negative   OCCULTUA 1+*   NITRITE Negative   UROBILINOGEN Negative   LEUKOCYTESUR Negative   RBCUA 8*   WBCUA 7*   BACTERIA Rare   HYALINECASTS 0       Significant Imaging: I have reviewed all pertinent imaging results/findings within the past 24 hours.

## 2022-01-14 NOTE — PT/OT/SLP PROGRESS
"Speech Language Pathology Treatment    Patient Name:  Kamar Muñoz   MRN:  [id number]  Admitting Diagnosis: Embolic stroke involving right middle cerebral artery    Recommendations:                 General Recommendations:  Further assess cognition: visual, reading and writing skills Cognitive-linguistic therapy and Cognitive-linguistic evaluation   SLP will monitor with reg diet tray x1   Diet recommendations:  Regular, Thin   Aspiration Precautions: standard precautions, upright for meals, R handed, can feed self, whole meds, straws ok, slow rate and alternate sips and bites during meals.    General Precautions: Standard, fall,vision impaired ([place], uses eyeglasses)  Communication strategies:  Pt is verbal and can attend to SLP, he follows commands but needs repetition due to dcr'd hearing acuity    Subjective     Pt seen for ongoing speech-language/cognitive tx. Pt awake/alert and awaiting d/c. He was agreeable to tx, though incorrectly reported working with this clinician before.   Patient goals: "I don't want anything" re: PO trials.      Pain/Comfort:  · Pain Rating 1: 0/10    Respiratory Status: Nasal cannula    Objective:     Has the patient been evaluated by SLP for swallowing?   Yes  Keep patient NPO? No   Current Respiratory Status:  Nasal cannula      Pt seated upright in bed for tx. He reported good tolerance of current diet level, though deferred PO trials this date.     Motor speech: mild dysarthria present at the conversation level c/b slurred, mumbled speech with fast rate of speech. Limited ROM of tongue observed likely 2/2 dry mouth. Pt tolerated straw sips water x2 which greatly improved speech intelligibility. Motor speech strategies of S.O.S. (Speak Loud, Overarticulate, Speak Slow) reviewed with pt. Poor deficit insight reported as pt denied speech change. Generalized oral motor weakness observed during oral motor exam.     Cognition: Pt oriented x4. He recalled previous occupation, current " address, and number of children. He incorrectly listed three locations where children live rather than four for his four children. Verbal math reasoning word problems completed with 100% accuracy. Abstract verbal reasoning completed with 100% accuracy. Clock drawing task completed with poor precision. Additional numbers (up to 42) placed on clock with poor use of left side of clock. Incorrect time placed by pt. Visual scanning task for single stimuli completed with 100% accuracy. When shown clock compared to clock on wall, pt unable to differentiate the difference or mistakes in his clock.     Reading: pt read aloud note written from his daughter with 100% accuracy.     Writing: Pt reports baseline poor writing skills. He wrote his home address with poor legibility. Pt reports baseline writing skills, though unclear true baseline 2/2 poor deficit awareness.      Assessment:     Kamar Muñoz is a 76 y.o. male admitted with R CVA and L cerebellar CVA. He presents with mild dysarthria and mild-mod cognitive linguistic deficits c/b slurred/mumbled speech with reduced movement of articulators, poor deficit awareness, reduced planning, impaired safety awareness, and impaired executive functioning skills. As pt ind in PLOF, pt will benefit from Outpatient SLP tx at d/c. SLP to follow while inpatient.     Goals:   Multidisciplinary Problems     SLP Goals     Not on file          Multidisciplinary Problems (Resolved)        Problem: SLP Goal    Goal Priority Disciplines Outcome   SLP Goal   (Resolved)     SLP Met   Description: Short Term Goals:  1. Patient will successfully participate in [id number] evaluation to further assess for any communication impairments s/p stroke  ongoing 1/13  2. Pt will participate in ongoing swallow assessment to determine least restrictive diet. MET 1/13  3. Pt will tolerate regular tray/thin liquids with no audible s/s of dysphagia and good oral clearance x1 session  4.  Further assess cognitive and writing skills next session to delineate high level cognitive goals.  5. Pt will complete simple oral motor ex to increase ROM and strength for speech production with min cues.  6. Pt will recall orientation info to year, date and time with use of external cues.  7. Educate pt and family on goals of SLP and deficits associated with R CVA in next session.                        Plan:     · Patient to be seen:  3 x/week   · Plan of Care expires:  02/11/22  · Plan of Care reviewed with:  patient   · SLP Follow-Up:  Yes       Discharge recommendations:   (TBD, likely need ST at DC)   Barriers to Discharge:  None    Time Tracking:     SLP Treatment Date:   01/14/22  Speech Start Time:  1030  Speech Stop Time:  1048     Speech Total Time (min):  18 min    Billable Minutes: Speech Therapy Individual 18 mins    01/14/2022

## 2022-01-14 NOTE — DISCHARGE SUMMARY
[facility] Medicine  Discharge Summary      Patient Name: Kamar Muñoz  MRN: [id number]  Patient Class: IP- Inpatient  Admission Date: 1/12/2022  Hospital Length of Stay: 2 days  Discharge Date and Time: 1/14/2022  5:59 PM  Attending Physician: Heather Vitale*   Discharging Provider: Heather Vitale MD  Primary Care Provider: Basim Guerrero MD      HPI:   Kamar Muñoz is a 77 yo male with a pmh of HTN, STEMI with stent placement on 1/9/22, DM2. He presented today with stroke symptoms x 1 day. He was noted to have a left  facial droop and slurred speech at home since yesterday morning after discharging from [place] [facility] with coronary stent placement. He resisted returning to the hospital yesterday. The patient states he had not started taking the brilinta yet because he did not know how it would interact with his other medications. He says he fell last night by tripping on a rug and hit his right arm and leg, denies hitting head. He denies any symptoms and states he cannot hear that he has slurred speech. He was also found to be in DKA on arrival. He does not recall if he took his insulin at home last night. He was evaluated by neurovascular provider after head CT showed subacute embolic strokes. EKG with Afib. She recommended continued DAPT, MRI/MRA brain imaging. Given a liter of IV fluids and started on insulin drip per ED provider.       * No surgery found *      Hospital Course:   No notes on file     Goals of Care Treatment Preferences:  Code Status: Full Code      Consults:   Consults (From admission, onward)          Status Ordering Provider     Inpatient consult to Cardiology-[facility]  Once        Provider:  (Not yet assigned)    Completed HEATHER VITALE     Inpatient consult to Cardiology[facility]  Once        Provider:  (Not yet assigned)    Completed CAMMIE HUTCHINS     Inpatient consult to Registered Dietitian/Nutritionist  Once         Provider:  (Not yet assigned)    Completed CAMMIE HUTCHINS     Inpatient consult to Vascular (Stroke) Neurology  Once        Provider:  (Not yet assigned)    Completed CAMMIE HUTCHINS     Inpatient consult to Physical Medicine Rehab  Once        Provider:  (Not yet assigned)    Acknowledged CAMMIE HUTCHINS     Inpatient consult to Registered Dietitian/Nutritionist  Once        Provider:  (Not yet assigned)    Completed CAMMIE HUTCHINS     IP consult to case management/social work  Once        Provider:  (Not yet assigned)    Acknowledged CAMMIE HUTCHINS     Consult to Telemedicine - Acute Stroke  Once        Provider:  (Not yet assigned)    Acknowledged KRISTIAN ELIZALDE            * Embolic stroke involving right middle cerebral artery  Dysarthria and anarthria  Presents with left facial droop and dysarthria x 1 day  CTA head with subacute infarcts s/p stent placement  Cont DAPT, statin  MRI brain  MRA brain and neck  Echo with bubble: There is no evidence of intracardiac shunting.  Consult vascular neurology: Continue DAPT, obtain MRI/MRA head/neck. Cerebellar stroke not large enough that significant mass effect anticipated, but maintain Na >140. Anticoagulants: Apixaban 5 mg BID - start 7 days post event due to size and presence of petechial hemorrhage.   Resume home BP meds. Lipitor 40 Consult PT/OT/ST  Recent lipids, A1C noted  TSH normal  Outpatient SLP FU        BRENDAN (acute kidney injury)  Creatinine 1.7 with baseline 1.0-1.3  Cont IV fluids. D/c  discontinued      Afib  New onset Afib  Consult cardiology- appreciates      Diabetic ketoacidosis without coma associated with type 2 diabetes mellitus  Type 2 diabetes mellitus with hyperglycemia, with long-term current use of insulin    Takes metformin, lantus 28u nightly, and novolog 17u with meals--hold home meds  A1C 12.3  -cont IV fluids  -insulin drip  -hourly accuchecks  -NPO- cleared by speech  - continue detemir 14u  nightly if able to swallow  -BMP q4h  -transition off insulin when gap closes, start diabetic diet          Coronary artery disease involving native coronary artery of native heart with unstable angina pectoris  Cont ASA, statin, brilinta  Recent stent placement      Primary hypertension  Hold losartan and cardizem   Permissive HTN- resume home meds on 1/14          Final Active Diagnoses:    Diagnosis Date Noted POA    PRINCIPAL PROBLEM:  Embolic stroke involving right middle cerebral artery [I63.411] 01/12/2022 Yes    Embolic stroke involving left cerebellar artery [I63.442] 01/13/2022 Yes    Dysarthria and anarthria [R47.1] 01/13/2022 Yes    Diabetic ketoacidosis without coma associated with type 2 diabetes mellitus [E11.10] 01/12/2022 Yes    Afib [I48.91] 01/12/2022 Yes    BRENDAN (acute kidney injury) [N17.9] 01/12/2022 Yes    Coronary artery disease involving native coronary artery of native heart with unstable angina pectoris [I25.110] 01/09/2022 Yes    Type 2 diabetes mellitus with hyperglycemia, with long-term current use of insulin [E11.65, Z79.4] 03/08/2013 Not Applicable    Primary hypertension [I10] 07/20/2012 Yes      Problems Resolved During this Admission:       Discharged Condition: stable    Disposition: Home or Self Care    Follow Up:     Patient Instructions:      Ambulatory referral/consult to Physical/Occupational Therapy   Standing Status: Future   Referral Priority: Routine Referral Type: Physical Medicine   Referral Reason: Specialty Services Required   Requested Specialty: Physical Therapy   Number of Visits Requested: 1     Diet diabetic     Diet Cardiac     SLP eval of swallow & oral function   Standing Status: Future Standing Exp. Date: 01/14/23     Activity as tolerated           Pending Diagnostic Studies:       Procedure Component Value Units Date/Time    EKG 12-lead [[id number]]     Order Status: Sent Lab Status: No result            Medications:  Reconciled Home Medications:       Medication List        START taking these medications      amiodarone 200 MG Tab  Commonly known as: PACERONE  Take 2 tablet (400mg) by mouth twice a day for 7 days then 2 tablets (400mg) daily x 7 days then 1 tablet 200mg daily thereafter     clopidogreL 75 mg tablet  Commonly known as: PLAVIX  Take 1 tablet (75 mg total) by mouth once daily.     ELIQUIS 5 mg Tab  Generic drug: apixaban  Take 1 tablet (5 mg total) by mouth 2 (two) times daily.  Start taking on: January 19, 2022     pantoprazole 40 MG tablet  Commonly known as: PROTONIX  Take 1 tablet (40 mg total) by mouth once daily.  Replaces: PRILOSEC ORAL            CHANGE how you take these medications      gabapentin 300 MG capsule  Commonly known as: NEURONTIN  Take 1 capsule (300 mg total) by mouth 2 (two) times daily.  What changed: additional instructions            CONTINUE taking these medications      aspirin 81 MG EC tablet  Commonly known as: ECOTRIN  Take 81 mg by mouth once daily.     atorvastatin 40 MG tablet  Commonly known as: LIPITOR  Take 1 tablet (40 mg total) by mouth once daily.     blood sugar diagnostic Strp  1 strip by Misc.(Non-Drug; Combo Route) route 2 (two) times a day.     diclofenac sodium 1 % Gel  Commonly known as: VOLTAREN  APPLY 2 GRAMS TO AFFECTED AREA ONCE D     diltiaZEM 120 MG Cp24  Commonly known as: CARDIZEM CD  Take 1 capsule (120 mg total) by mouth once daily.     ergocalciferol 50,000 unit Cap  Commonly known as: ERGOCALCIFEROL     GLUCAGON EMERGENCY KIT (HUMAN) 1 mg Solr  Generic drug: glucagon  Inject 1 mg into the muscle as needed.     hydrocortisone 2.5 % cream  APPLY TO GROIN RASH BID NO LONGER THAN 7 DAYS     insulin aspart U-100 100 unit/mL (3 mL) Inpn pen  Commonly known as: NovoLOG Flexpen U-100 Insulin  Inject 17 Units into the skin 3 (three) times daily with meals.     insulin glargine 100 unit/mL injection  Commonly known as: Lantus  Inject 28 Units into the skin every evening.     ketoconazole 2 %  "cream  Commonly known as: NIZORAL  APPLY TO GROIN RASH BID NO LONGER THAN 7 DAYS     lancets [facility] [place]  Commonly known as: ONETOUCH ULTRASOFT LANCETS  1 lancet by Misc.(Non-Drug; Combo Route) route 2 (two) times a day.     losartan 25 MG tablet  Commonly known as: COZAAR  Take 1 tablet (25 mg total) by mouth once daily.     metFORMIN 500 MG tablet  Commonly known as: GLUCOPHAGE  Take 1 tablet (500 mg total) by mouth 2 (two) times daily with meals.     metoprolol succinate 25 MG 24 hr tablet  Commonly known as: TOPROL-XL  Take 1 tablet (25 mg total) by mouth once daily.     MULTIVITAMIN ORAL  Take 1 tablet by mouth once daily.     nitroGLYCERIN 0.4 MG SL tablet  Commonly known as: NITROSTAT  Place 1 tablet (0.4 mg total) under the tongue every 5 (five) minutes as needed for Chest pain.     * pen needle, diabetic 30 gauge x 5/16" Ndle  Commonly known as: PEN NEEDLE  1 Units by Misc.(Non-Drug; Combo Route) route 3 (three) times daily.     * BD ULTRA-FINE SHORT PEN NEEDLE 31 gauge x 5/16" Ndle  Generic drug: pen needle, diabetic  3 (three) times daily.     polycarbophil 625 mg tablet  Commonly known as: FIBERCON  Take 1 tablet by mouth once daily.           * This list has 2 medication(s) that are the same as other medications prescribed for you. Read the directions carefully, and ask your doctor or other care provider to review them with you.                STOP taking these medications      BRILINTA 90 mg tablet  Generic drug: ticagrelor     PRILOSEC ORAL  Replaced by: pantoprazole 40 MG tablet              Indwelling Lines/Drains at time of discharge:   Lines/Drains/Airways       Arterial Line              Arterial Line -- days                    Time spent on the discharge of patient: 35 minutes         Heather Vitale MD  Department of Hospital Medicine  [place] - Med Surg  "

## 2022-01-14 NOTE — ASSESSMENT & PLAN NOTE
- HgbA1c 12 last admission  - DKA upon admission; management per primary team  - stressed importance of blood sugar control with patient on previous admission and again with daughter this morning

## 2022-01-14 NOTE — ASSESSMENT & PLAN NOTE
- CT with acute CVA  - MRI with confirmation of CVA; MRA with left vertebral stenosis and <50% ICA stenosis  - medical management as detailed previously

## 2022-01-15 NOTE — PLAN OF CARE
[facility]      HOME HEALTH ORDERS  FACE TO FACE ENCOUNTER    Patient Name: Kamar Muñoz  YOB: 1945    PCP: Basim Guerrero MD   PCP Address: [address] / [place] [address]  PCP Phone Number: [phone]  PCP Fax: [phone]    Encounter Date: 1/12/22    Admit to Home Health    Diagnoses:  Active Hospital Problems    Diagnosis  POA    *Embolic stroke involving right middle cerebral artery [I63.411]  Yes    Embolic stroke involving left cerebellar artery [I63.442]  Yes    Dysarthria and anarthria [R47.1]  Yes    Diabetic ketoacidosis without coma associated with type 2 diabetes mellitus [E11.10]  Yes    Afib [I48.91]  Yes    BRENDAN (acute kidney injury) [N17.9]  Yes    Coronary artery disease involving native coronary artery of native heart with unstable angina pectoris [I25.110]  Yes    Type 2 diabetes mellitus with hyperglycemia, with long-term current use of insulin [E11.65, Z79.4]  Not Applicable    Primary hypertension [I10]  Yes      Resolved Hospital Problems   No resolved problems to display.       Follow Up Appointments:  Future Appointments   Date Time Provider Department Center   1/19/2022  2:40 PM Brittani Weems MD [facility]   1/31/2022  2:30 PM CARDIAC, PET IMAGING [facility] [place] [facility]   2/2/2022  1:40 PM Constantine Seay MD [facility] CARDIO [facility]   4/6/2022  1:00 PM Basim Guerrero MD [facility]       Allergies:  Review of patient's allergies indicates:   Allergen Reactions    Iodine      Other reaction(s): swelling  Other reaction(s): Itching  Other reaction(s): Rash       Medications: Review discharge medications with patient and family and provide education.    Current Facility-Administered Medications   Medication Dose Route Frequency Provider Last Rate Last Admin    acetaminophen suppository 650 mg  650 mg Rectal Q6H PRN Nyla Tripp NP   650 mg at 01/12/22 2101    acetaminophen tablet 650 mg  650 mg  Oral Q8H PRN Arik Whelan MD        acetaminophen tablet 650 mg  650 mg Oral Q4H PRN Nyla Tripp NP        [START ON 1/28/2022] amiodarone tablet 200 mg  200 mg Oral Daily HIRAL Fournier, MARIAH        [START ON 1/21/2022] amiodarone tablet 400 mg  400 mg Oral Daily HIRAL Fournier, MARIAH   400 mg at 01/14/22 1448    amiodarone tablet 400 mg  400 mg Oral BID HIRAL Fournier, ANP        aspirin EC tablet 81 mg  81 mg Oral Daily Nyla Tripp NP   81 mg at 01/14/22 0815    atorvastatin tablet 40 mg  40 mg Oral Daily Nyla Tripp NP   40 mg at 01/14/22 0815    clopidogreL tablet 75 mg  75 mg Oral Daily HIRAL Fournier, MARIAH   75 mg at 01/14/22 0815    dextrose 5 % and 0.45 % NaCl infusion  125 mL/hr Intravenous Continuous PRN Nyla Tripp NP   Paused at 01/12/22 2308    dextrose 50% injection 12.5 g  12.5 g Intravenous PRN Nyla Tripp NP   12.5 g at 01/14/22 0518    dextrose 50% injection 25 g  25 g Intravenous PRN Nyla Tripp NP        diltiaZEM 24 hr capsule 120 mg  120 mg Oral Daily Heather Vitale MD   120 mg at 01/14/22 0815    enoxaparin injection 40 mg  40 mg Subcutaneous Daily Nyla Tripp NP   40 mg at 01/13/22 1707    gabapentin capsule 300 mg  300 mg Oral QHS Hyun Obando NP   300 mg at 01/13/22 2319    glucagon (human recombinant) injection 1 mg  1 mg Intramuscular PRN Hyun Obando NP        glucose chewable tablet 16 g  16 g Oral PRN Hyun Obando NP        glucose chewable tablet 24 g  24 g Oral PRN Hyun Obando NP        influenza (QUADRIVALENT ADJUVANTED PF) vaccine 0.5 mL  0.5 mL Intramuscular Prior to discharge Heather Vitale MD        insulin aspart U-100 pen 0-5 Units  0-5 Units Subcutaneous QID (AC + HS) PRN Hyun Obando, LUIS   1 Units at 01/13/22 4749    insulin detemir U-100 pen 21 Units  21 Units Subcutaneous QHS Huey  MD Joe   21 Units at 01/13/22 2138    losartan tablet 25 mg  25 mg Oral Daily Heather Vitale MD   25 mg at 01/14/22 0815    melatonin tablet 6 mg  6 mg Oral Nightly PRN Arik Whelan MD        metoprolol succinate (TOPROL-XL) 24 hr tablet 25 mg  25 mg Oral Daily Nyla Tripp NP   25 mg at 01/14/22 0815    mupirocin 2 % ointment   Nasal BID Heather Vitale MD   Given at 01/14/22 0816    ondansetron injection 4 mg  4 mg Intravenous Q12H PRN Nyla Tripp NP        pantoprazole EC tablet 40 mg  40 mg Oral Daily Nyla Tripp NP   40 mg at 01/14/22 0815    pneumoc 13-edyta conj-dip cr(PF) (PREVNAR 13 (PF)) 0.5 mL  0.5 mL Intramuscular Prior to discharge Heather Vitale MD        sodium chloride 0.9% bolus 500 mL  500 mL Intravenous Continuous PRN Nyla Tripp NP        sodium chloride 0.9% flush 10 mL  10 mL Intravenous PRN Arik Whelan MD        sodium chloride 0.9% flush 10 mL  10 mL Intravenous PRN Arik Whelan MD        sodium chloride 0.9% flush 10 mL  10 mL Intravenous PRN Nyla Tripp NP        sodium chloride 0.9% flush 10 mL  10 mL Intravenous PRN Nyla Tripp NP        sodium chloride 0.9% flush 10 mL  10 mL Intravenous PRN Nyla Tripp NP         Current Outpatient Medications   Medication Sig Dispense Refill    aspirin (ECOTRIN) 81 MG EC tablet Take 81 mg by mouth once daily.      atorvastatin (LIPITOR) 40 MG tablet Take 1 tablet (40 mg total) by mouth once daily. 90 tablet 3    diltiaZEM (CARDIZEM CD) 120 MG Cp24 Take 1 capsule (120 mg total) by mouth once daily. 90 capsule 3    gabapentin (NEURONTIN) 300 MG capsule Take 1 capsule (300 mg total) by mouth 2 (two) times daily. (Patient taking differently: Take 300 mg by mouth 2 (two) times daily. Takes nightly) 180 capsule 3    insulin aspart U-100 (NOVOLOG FLEXPEN U-100 INSULIN) 100 unit/mL (3 mL) InPn pen Inject 17 Units into the  "skin 3 (three) times daily with meals. 45.9 mL 3    insulin glargine (LANTUS) 100 unit/mL injection Inject 28 Units into the skin every evening. 25.2 mL 3    losartan (COZAAR) 25 MG tablet Take 1 tablet (25 mg total) by mouth once daily. 90 tablet 3    metFORMIN (GLUCOPHAGE) 500 MG tablet Take 1 tablet (500 mg total) by mouth 2 (two) times daily with meals. 180 tablet 3    metoprolol succinate (TOPROL-XL) 25 MG 24 hr tablet Take 1 tablet (25 mg total) by mouth once daily. 30 tablet 11    amiodarone (PACERONE) 200 MG Tab Take 2 tablet (400mg) by mouth twice a day for 7 days then 2 tablets (400mg) daily x 7 days then 1 tablet 200mg daily thereafter 90 tablet 3    [START ON 1/19/2022] apixaban (ELIQUIS) 5 mg Tab Take 1 tablet (5 mg total) by mouth 2 (two) times daily. 60 tablet 5    BD ULTRA-FINE SHORT PEN NEEDLE 31 gauge x 5/16" Ndle 3 (three) times daily.      blood sugar diagnostic Strp 1 strip by Misc.(Non-Drug; Combo Route) route 2 (two) times a day. 200 strip 6    clopidogreL (PLAVIX) 75 mg tablet Take 1 tablet (75 mg total) by mouth once daily. 30 tablet 11    diclofenac sodium (VOLTAREN) 1 % Gel APPLY 2 GRAMS TO AFFECTED AREA ONCE D      ergocalciferol (ERGOCALCIFEROL) 50,000 unit Cap       glucagon, human recombinant, (GLUCAGON EMERGENCY KIT, HUMAN,) 1 mg SolR Inject 1 mg into the muscle as needed. 1 each 0    hydrocortisone 2.5 % cream APPLY TO GROIN RASH BID NO LONGER THAN 7 DAYS      ketoconazole (NIZORAL) 2 % cream APPLY TO GROIN RASH BID NO LONGER THAN 7 DAYS      lancets (ONETOUCH ULTRASOFT LANCETS) Misc 1 lancet by Misc.(Non-Drug; Combo Route) route 2 (two) times a day. 200 each 6    MULTIVITAMIN ORAL Take 1 tablet by mouth once daily.       nitroGLYCERIN (NITROSTAT) 0.4 MG SL tablet Place 1 tablet (0.4 mg total) under the tongue every 5 (five) minutes as needed for Chest pain. 25 tablet 11    pantoprazole (PROTONIX) 40 MG tablet Take 1 tablet (40 mg total) by mouth once daily. 30 " "tablet 11    pen needle, diabetic (PEN NEEDLE) 30 gauge x 5/16" Ndle 1 Units by Misc.(Non-Drug; Combo Route) route 3 (three) times daily. 100 each 3    polycarbophil (FIBERCON) 625 mg tablet Take 1 tablet by mouth once daily.        Discharge Medication List as of 1/14/2022  4:55 PM      START taking these medications    Details   amiodarone (PACERONE) 200 MG Tab Take 2 tablet (400mg) by mouth twice a day for 7 days then 2 tablets (400mg) daily x 7 days then 1 tablet 200mg daily thereafter, Normal      apixaban (ELIQUIS) 5 mg Tab Take 1 tablet (5 mg total) by mouth 2 (two) times daily., Starting Wed 1/19/2022, Normal      clopidogreL (PLAVIX) 75 mg tablet Take 1 tablet (75 mg total) by mouth once daily., Starting Fri 1/14/2022, Until Sat 1/14/2023, Normal      pantoprazole (PROTONIX) 40 MG tablet Take 1 tablet (40 mg total) by mouth once daily., Starting Fri 1/14/2022, Until Sat 1/14/2023, Normal         CONTINUE these medications which have NOT CHANGED    Details   aspirin (ECOTRIN) 81 MG EC tablet Take 81 mg by mouth once daily., Until Discontinued, Historical Med      atorvastatin (LIPITOR) 40 MG tablet Take 1 tablet (40 mg total) by mouth once daily., Starting Tue 1/11/2022, Until Wed 1/11/2023, Normal      diltiaZEM (CARDIZEM CD) 120 MG Cp24 Take 1 capsule (120 mg total) by mouth once daily., Starting Wed 1/5/2022, Normal      gabapentin (NEURONTIN) 300 MG capsule Take 1 capsule (300 mg total) by mouth 2 (two) times daily., Starting Wed 1/5/2022, Normal      insulin aspart U-100 (NOVOLOG FLEXPEN U-100 INSULIN) 100 unit/mL (3 mL) InPn pen Inject 17 Units into the skin 3 (three) times daily with meals., Starting Wed 1/5/2022, Until Thu 1/5/2023, Normal      insulin glargine (LANTUS) 100 unit/mL injection Inject 28 Units into the skin every evening., Starting Wed 1/5/2022, Until Thu 1/5/2023, Normal      losartan (COZAAR) 25 MG tablet Take 1 tablet (25 mg total) by mouth once daily., Starting Wed 1/5/2022, " "Normal      metFORMIN (GLUCOPHAGE) 500 MG tablet Take 1 tablet (500 mg total) by mouth 2 (two) times daily with meals., Starting Wed 1/5/2022, Normal      metoprolol succinate (TOPROL-XL) 25 MG 24 hr tablet Take 1 tablet (25 mg total) by mouth once daily., Starting Tue 1/11/2022, Until Wed 1/11/2023, Normal      BD ULTRA-FINE SHORT PEN NEEDLE 31 gauge x 5/16" Ndle 3 (three) times daily., Starting Sat 10/2/2021, Historical Med      blood sugar diagnostic Strp 1 strip by Misc.(Non-Drug; Combo Route) route 2 (two) times a day., Starting Wed 1/5/2022, Normal      diclofenac sodium (VOLTAREN) 1 % Gel APPLY 2 GRAMS TO AFFECTED AREA ONCE D, Historical Med      ergocalciferol (ERGOCALCIFEROL) 50,000 unit Cap Starting 7/6/2014, Until Discontinued, Historical Med      glucagon, human recombinant, (GLUCAGON EMERGENCY KIT, HUMAN,) 1 mg SolR Inject 1 mg into the muscle as needed., Starting Mon 10/5/2020, Until Tue 10/5/2021, Normal      hydrocortisone 2.5 % cream APPLY TO GROIN RASH BID NO LONGER THAN 7 DAYS, Historical Med      ketoconazole (NIZORAL) 2 % cream APPLY TO GROIN RASH BID NO LONGER THAN 7 DAYS, Historical Med      lancets (ONETOUCH ULTRASOFT LANCETS) Misc 1 lancet by Misc.(Non-Drug; Combo Route) route 2 (two) times a day., Starting Wed 1/5/2022, Normal      MULTIVITAMIN ORAL Take 1 tablet by mouth once daily. , Starting Fri 1/20/2012, Historical Med      nitroGLYCERIN (NITROSTAT) 0.4 MG SL tablet Place 1 tablet (0.4 mg total) under the tongue every 5 (five) minutes as needed for Chest pain., Starting Tue 1/11/2022, Until Wed 1/11/2023 at 2359, Normal      pen needle, diabetic (PEN NEEDLE) 30 gauge x 5/16" Ndle 1 Units by Misc.(Non-Drug; Combo Route) route 3 (three) times daily., Starting Sat 10/2/2021, Normal      polycarbophil (FIBERCON) 625 mg tablet Take 1 tablet by mouth once daily. , Starting Fri 1/20/2012, Historical Med         STOP taking these medications       OMEPRAZOLE (PRILOSEC ORAL) Comments:   Reason " for Stopping:         ticagrelor (BRILINTA) 90 mg tablet Comments:   Reason for Stopping:                 I have seen and examined this patient within the last 30 days. My clinical findings that support the need for the home health skilled services and home bound status are the following:no   Weakness/numbness causing balance and gait disturbance due to Weakness/Debility making it taxing to leave home.  Requiring assistive device to leave home due to unsteady gait caused by  Weakness/Debility.     Diet:   cardiac diet        Referrals/ Consults  Physical Therapy to evaluate and treat. Evaluate for home safety and equipment needs; Establish/upgrade home exercise program. Perform / instruct on therapeutic exercises, gait training, transfer training, and Range of Motion.  Occupational Therapy to evaluate and treat. Evaluate home environment for safety and equipment needs. Perform/Instruct on transfers, ADL training, ROM, and therapeutic exercises.    Activities:   activity as tolerated    Nursing:   Agency to admit patient within 24 hours of hospital discharge unless specified on physician order or at patient request    SN to complete comprehensive assessment including routine vital signs. Instruct on disease process and s/s of complications to report to MD. Review/verify medication list sent home with the patient at time of discharge  and instruct patient/caregiver as needed. Frequency may be adjusted depending on start of care date.     Skilled nurse to perform up to 3 visits PRN for symptoms related to diagnosis    Notify MD if SBP > 160 or < 90; DBP > 90 or < 50; HR > 120 or < 50; Temp > 101; O2 < 88%; Other:       Ok to schedule additional visits based on staff availability and patient request on consecutive days within the home health episode.    When multiple disciplines ordered:    Start of Care occurs on Sunday - Wednesday schedule remaining discipline evaluations as ordered on separate consecutive days  following the start of care.    Thursday SOC -schedule subsequent evaluations Friday and Monday the following week.     Friday - Saturday SOC - schedule subsequent discipline evaluations on consecutive days starting Monday of the following week.    For all post-discharge communication and subsequent orders please contact patient's primary care physician. If unable to reach primary care physician or do not receive response within 30 minutes, please contact  for clinical staff order clarification        Home Health Aide:  Nursing Three times weekly, Physical Therapy Three times weekly and Occupational Therapy Three times weekly        I certify that this patient is confined to his home and needs intermittent skilled nursing care, physical therapy and occupational therapy.

## 2022-01-15 NOTE — PROGRESS NOTES
1/15/22:  [place] contacted by Dr Benitez via secure chat. Pt had d/c yesterday. Rehab was previously being set up but pt's family declined. Now stating they need assistance caring for pt.  orders were entered.     HH orders sent/accepted via [employer] fax system by [facility] [place],  nurse will contact pt to arrange a visit for Sunday 1/16/22    Zoe Swann, RN Transitional Navigator  [phone]

## 2022-01-16 LAB
POCT GLUCOSE: 108 MG/DL (ref 70–110)
POCT GLUCOSE: 56 MG/DL (ref 70–110)

## 2022-01-18 ENCOUNTER — PATIENT OUTREACH (OUTPATIENT)
Dept: ADMINISTRATIVE | Facility: OTHER | Age: 77
End: 2022-01-18
Payer: MEDICARE

## 2022-01-18 ENCOUNTER — TELEPHONE (OUTPATIENT)
Dept: CARDIOLOGY | Facility: CLINIC | Age: 77
End: 2022-01-18
Payer: MEDICARE

## 2022-01-18 NOTE — PROGRESS NOTES
IP Liaison - Final Visit Note    Patient: Kamar Muñoz  MRN:  [id number]  Date of Service:  1/18/2022  Completed by:  PAUL Rodriguez    Reason for Visit   Patient presents with    IP Liaison Chart Review     Patient discharged from hospital before KAYLYNW was able to complete follow-up visit.        Patient Summary     Discharge Date: 1/14/2022  Discharge telephone number/address: [phone] / [address] [facility] [address]  Follow up provider: Brittani Weems MD  Follow up appointments: 1/19/2022 @ 2:40pm  Home Health agency & telephone number: [facility] [place]  DME ordered &  name: n/a  Assigned OPCM RN/SW: n/a  Report sent to follow up team (PCP/OPCM) via in basket message: n/a  Community Resources arranged including agency name & contact info: n/a        PAUL Rodriguez

## 2022-01-18 NOTE — TELEPHONE ENCOUNTER
----- Message from Fauzia Espino sent at 1/18/2022 10:52 AM CST -----  Contact: Daughter Marisa [phone]  Type: Requesting to speak with nurse    Who Called: Pt's daughter   Regarding: discuss pt    Would the patient rather a call back or a response via [facility]? Call back  Best Call Back Number: [phone]  Additional Information: n/a

## 2022-01-18 NOTE — TELEPHONE ENCOUNTER
Spoke with daughter and she is very upset with the care in the hospital.  She has called to try and get her Dad in a Rehab.

## 2022-01-18 NOTE — DISCHARGE SUMMARY
[facility] - Intensive Care  Cardiology  Discharge Summary      Patient Name: Kamar Muñoz  MRN: [id number]  Admission Date: 1/9/2022  Hospital Length of Stay: 2 days  Discharge Date and Time: 1/11/2022 11:28 AM  Attending Physician: No att. providers found  Discharging Provider: HIRAL Sargent, ANP  Primary Care Physician: Basim Guerrero MD    HPI: 77 yo M w/ PMH of HTN and DM2 w/ hgba1c 12% who presented to the ED w/ c/o typical cp which started about 3 hrs prior to presentation and awoke him from sleep.  He notes radiation of the pain to his back and L shoulder and a/w worse pain w/ a deep breath.  She was seen by his PCP recently for c/o sob and was referred to cardiology however did not have a cardiac w/u.  In the ED, he was noted to have ST elevations in the inferior leads and trop of 18 and code STEMI was activated.  He was taken to the cath lab urgently and found to have a 100% occlusion of the mid RCA which was thought to be the culprit and is s/p successful PCI of the prox and mid RCA w/ a 3.0x38 mm LAUREN with improvement in his cp, although he continues to note some L arm pain.      Procedure(s) (LRB):  CATHETERIZATION, HEART, LEFT (Left)  Stent, Drug Eluting, Single Vessel, Coronary (Right)     Indwelling Lines/Drains at time of discharge: none       Hospital Course     1/9/2022 Presented to the ER with complaints of chest pain. Initial EKG demonstrated CORINNA to inferior leads with troponin 18. Taken emergently to cath lab for procedure with following results:    prox LAD 75%  Mid %  Successful LAUREN placement to RCA with good results  Medical management of LAD   Further details per cath report    Admitted to ICU post procedure and continued on GDMT. Chest pain free and plan for echo tomorrow     1/10/2022 Hemodynamically stable overnight. Reports some dizziness and nausea overnight. No arrhythmias noted on telemetry. Occasional PVCs noted. No complaints of chest pain. Continued on GDMT  for CAD. HgbA1c noted to be elevated at 12- reports compliance with insulin therapy but reports dietary non compliance. Stressed importance of strict blood sugar control. Echocardiogram pending today. Plan to transfer out of ICU today   1/11/2022 Remains in ICU overnight given lack of telemetry beds on floor. Echocardiogram with EF 65%. No complaints of chest pain and SOB. Placed on GDMT with good tolerance. No arrhythmias noted on telemetry. Ambulated in hallway with no complaints overnight. Deemed ready for discharge and sent home on good medication regimen with ASA, Brilinta, BB, Statin and ARB. Instructed to follow up in cardiology clinic with cardiac PET prior to assess LAD stenosis. Compliance with medication was discussed with particular attention paid to importance of  DAPT for one year without interruption. The risk of abrupt stent occlusion and MI with early discontinuation was specifically stressed- verbalized understanding. Follow up with PCP for blood sugar management     Consults: none     Significant Diagnostic Studies: Cardiac Graphics: Echocardiogram:   Transthoracic echo (TTE) complete (Cupid Only):   Results for orders placed or performed during the hospital encounter of 01/09/22   Echo Saline Bubble? No   Result Value Ref Range    AV mean gradient 3 mmHg    Ao peak marc 1.04 m/s    Ao VTI 22.63 cm    IVRT 85.63 msec    IVS 1.24 (A) 0.6 - 1.1 cm    LA size 3.97 cm    Left Atrium Major Axis 5.25 cm    Left Atrium Minor Axis 5.53 cm    LVIDd 5.05 3.5 - 6.0 cm    LVIDs 4.06 (A) 2.1 - 4.0 cm    LVOT diameter 2.04 cm    LVOT peak VTI 15.02 cm    Posterior Wall 1.19 (A) 0.6 - 1.1 cm    MV Peak A Marc 0.77 m/s    E wave deceleration time 212.99 msec    MV Peak E Marc 0.57 m/s    PV Peak D Marc 0.30 m/s    PV Peak S Marc 0.45 m/s    RA Major Axis 5.83 cm    RA Width 5.07 cm    RVDD 2.98 cm    TR Max Marc 1.68 m/s    TDI LATERAL 0.07 m/s    TDI SEPTAL 0.06 m/s    LA WIDTH 3.94 cm    Ao root annulus 3.25 cm     "AORTIC VALVE CUSP SEPERATION 1.95 cm    MV stenosis pressure 1/2 time 61.77 ms    LV Diastolic Volume 120.79 mL    LV Systolic Volume 72.46 mL    LVOT peak scooby 0.72 m/s    Mr max scooby 0.04 m/s    LA volume (mod) 34.37 cm3    MV "A" wave duration 13.70 msec    MV mean gradient 0 mmHg    MV peak gradient 3 mmHg    MV VTI 25.75 cm    LV LATERAL E/E' RATIO 8.14 m/s    LV SEPTAL E/E' RATIO 9.50 m/s    FS 20 %    LA volume 71.61 cm3    LV mass 241.65 g    Left Ventricle Relative Wall Thickness 0.47 cm    AV valve area 2.17 cm2    AV Velocity Ratio 0.69     AV index (prosthetic) 0.66     MV valve area p 1/2 method 3.56 cm2    MV valve area by continuity eq 1.91 cm2    E/A ratio 0.74     Mean e' 0.07 m/s    Pulm vein S/D ratio 1.50     LVOT area 3.3 cm2    LVOT stroke volume 49.07 cm3    AV peak gradient 4 mmHg    E/E' ratio 8.77 m/s    LV Systolic Volume Index 33.7 mL/m2    LV Diastolic Volume Index 56.18 mL/m2    LA Volume Index 33.3 mL/m2    LV Mass Index 112 g/m2    Triscuspid Valve Regurgitation Peak Gradient 11 mmHg    LA Volume Index (Mod) 16.0 mL/m2    BSA 2.16 m2    Right Atrial Pressure (from IVC) 15 mmHg    EF 60 %    TV rest pulmonary artery pressure 26 mmHg    Narrative    · The left ventricle is normal in size with concentric remodeling and   normal systolic function.  · The estimated ejection fraction is 60%.  · There are segmental left ventricular wall motion abnormalities.  · Normal left ventricular diastolic function.  · With mildly reduced right ventricular systolic function.  · Mild right atrial enlargement.  · The estimated PA systolic pressure is 26 mmHg.  · Elevated central venous pressure (15 mmHg).          Pending Diagnostic Studies:     None          Final Active Diagnoses:    Diagnosis Date Noted POA    PRINCIPAL PROBLEM:  STEMI involving right coronary artery [I21.11] 01/09/2022 Yes    Coronary artery disease involving native coronary artery of native heart with unstable angina pectoris " [I25.110] 01/09/2022 No    Type 2 diabetes mellitus with hyperglycemia, with long-term current use of insulin [E11.65, Z79.4] 03/08/2013 Not Applicable    Primary hypertension [I10] 07/20/2012 Yes    Overweight (BMI 25.0-29.9) [E66.3] 07/20/2012 Yes      Problems Resolved During this Admission:       Discharged Condition: good    Follow Up:    Patient Instructions:      Diet diabetic     Diet Cardiac     Activity as tolerated     Medications:  Reconciled Home Medications:      Medication List      START taking these medications    atorvastatin 40 MG tablet  Commonly known as: LIPITOR  Take 1 tablet (40 mg total) by mouth once daily.  Replaces: pravastatin 40 MG tablet     metoprolol succinate 25 MG 24 hr tablet  Commonly known as: TOPROL-XL  Take 1 tablet (25 mg total) by mouth once daily.     nitroGLYCERIN 0.4 MG SL tablet  Commonly known as: NITROSTAT  Place 1 tablet (0.4 mg total) under the tongue every 5 (five) minutes as needed for Chest pain.        CHANGE how you take these medications    gabapentin 300 MG capsule  Commonly known as: NEURONTIN  Take 1 capsule (300 mg total) by mouth 2 (two) times daily.  What changed: additional instructions        CONTINUE taking these medications    aspirin 81 MG EC tablet  Commonly known as: ECOTRIN  Take 81 mg by mouth once daily.     blood sugar diagnostic Strp  1 strip by Misc.(Non-Drug; Combo Route) route 2 (two) times a day.     diclofenac sodium 1 % Gel  Commonly known as: VOLTAREN  APPLY 2 GRAMS TO AFFECTED AREA ONCE D     diltiaZEM 120 MG Cp24  Commonly known as: CARDIZEM CD  Take 1 capsule (120 mg total) by mouth once daily.     ergocalciferol 50,000 unit Cap  Commonly known as: ERGOCALCIFEROL     GLUCAGON EMERGENCY KIT (HUMAN) 1 mg Solr  Generic drug: glucagon  Inject 1 mg into the muscle as needed.     hydrocortisone 2.5 % cream  APPLY TO GROIN RASH BID NO LONGER THAN 7 DAYS     insulin aspart U-100 100 unit/mL (3 mL) Inpn pen  Commonly known as: NovoLOG  "Flexpen U-100 Insulin  Inject 17 Units into the skin 3 (three) times daily with meals.     insulin glargine 100 unit/mL injection  Commonly known as: Lantus  Inject 28 Units into the skin every evening.     ketoconazole 2 % cream  Commonly known as: NIZORAL  APPLY TO GROIN RASH BID NO LONGER THAN 7 DAYS     lancets Misc  Commonly known as: ONETOUCH ULTRASOFT LANCETS  1 lancet by Misc.(Non-Drug; Combo Route) route 2 (two) times a day.     losartan 25 MG tablet  Commonly known as: COZAAR  Take 1 tablet (25 mg total) by mouth once daily.     metFORMIN 500 MG tablet  Commonly known as: GLUCOPHAGE  Take 1 tablet (500 mg total) by mouth 2 (two) times daily with meals.     MULTIVITAMIN ORAL  Take 1 tablet by mouth once daily.     pen needle, diabetic 30 gauge x 5/16" Ndle  Commonly known as: PEN NEEDLE  1 Units by Misc.(Non-Drug; Combo Route) route 3 (three) times daily.     polycarbophil 625 mg tablet  Commonly known as: FIBERCON  Take 1 tablet by mouth once daily.        STOP taking these medications    celecoxib 200 MG capsule  Commonly known as: CeleBREX     pravastatin 40 MG tablet  Commonly known as: PRAVACHOL  Replaced by: atorvastatin 40 MG tablet     PRILOSEC ORAL            Time spent on the discharge of patient: 45 minutes    HIRAL Sargent, ANP  Cardiology  [place] - Intensive Care  "

## 2022-01-19 ENCOUNTER — OFFICE VISIT (OUTPATIENT)
Dept: FAMILY MEDICINE | Facility: CLINIC | Age: 77
End: 2022-01-19
Payer: MEDICARE

## 2022-01-19 VITALS
SYSTOLIC BLOOD PRESSURE: 110 MMHG | WEIGHT: 197.75 LBS | OXYGEN SATURATION: 97 % | BODY MASS INDEX: 26.09 KG/M2 | DIASTOLIC BLOOD PRESSURE: 62 MMHG

## 2022-01-19 DIAGNOSIS — I25.10 CORONARY ARTERY DISEASE INVOLVING NATIVE HEART WITHOUT ANGINA PECTORIS, UNSPECIFIED VESSEL OR LESION TYPE: Primary | ICD-10-CM

## 2022-01-19 DIAGNOSIS — Z23 IMMUNIZATION DUE: ICD-10-CM

## 2022-01-19 DIAGNOSIS — E11.65 TYPE 2 DIABETES MELLITUS WITH HYPERGLYCEMIA, WITH LONG-TERM CURRENT USE OF INSULIN: ICD-10-CM

## 2022-01-19 DIAGNOSIS — Z95.5 STENTED CORONARY ARTERY: ICD-10-CM

## 2022-01-19 DIAGNOSIS — Z09 HOSPITAL DISCHARGE FOLLOW-UP: ICD-10-CM

## 2022-01-19 DIAGNOSIS — Z79.4 TYPE 2 DIABETES MELLITUS WITH HYPERGLYCEMIA, WITH LONG-TERM CURRENT USE OF INSULIN: ICD-10-CM

## 2022-01-19 DIAGNOSIS — I25.2 HISTORY OF ST ELEVATION MYOCARDIAL INFARCTION (STEMI): ICD-10-CM

## 2022-01-19 DIAGNOSIS — I69.30 HISTORY OF CEREBROVASCULAR ACCIDENT (CVA) WITH RESIDUAL DEFICIT: ICD-10-CM

## 2022-01-19 PROCEDURE — 1126F PR PAIN SEVERITY QUANTIFIED, NO PAIN PRESENT: ICD-10-PCS | Mod: CPTII,S$GLB,, | Performed by: FAMILY MEDICINE

## 2022-01-19 PROCEDURE — 90694 VACC AIIV4 NO PRSRV 0.5ML IM: CPT | Mod: S$GLB,,, | Performed by: FAMILY MEDICINE

## 2022-01-19 PROCEDURE — 1100F PR PT FALLS ASSESS DOC 2+ FALLS/FALL W/INJURY/YR: ICD-10-PCS | Mod: CPTII,S$GLB,, | Performed by: FAMILY MEDICINE

## 2022-01-19 PROCEDURE — G0008 FLU VACCINE - QUADRIVALENT - ADJUVANTED: ICD-10-PCS | Mod: S$GLB,,, | Performed by: FAMILY MEDICINE

## 2022-01-19 PROCEDURE — 99999 PR PBB SHADOW E&M-EST. PATIENT-LVL V: CPT | Mod: PBBFAC,,, | Performed by: FAMILY MEDICINE

## 2022-01-19 PROCEDURE — 3074F SYST BP LT 130 MM HG: CPT | Mod: CPTII,S$GLB,, | Performed by: FAMILY MEDICINE

## 2022-01-19 PROCEDURE — 90694 FLU VACCINE - QUADRIVALENT - ADJUVANTED: ICD-10-PCS | Mod: S$GLB,,, | Performed by: FAMILY MEDICINE

## 2022-01-19 PROCEDURE — 1111F DSCHRG MED/CURRENT MED MERGE: CPT | Mod: CPTII,S$GLB,, | Performed by: FAMILY MEDICINE

## 2022-01-19 PROCEDURE — 1159F PR MEDICATION LIST DOCUMENTED IN MEDICAL RECORD: ICD-10-PCS | Mod: CPTII,S$GLB,, | Performed by: FAMILY MEDICINE

## 2022-01-19 PROCEDURE — 1159F MED LIST DOCD IN RCRD: CPT | Mod: CPTII,S$GLB,, | Performed by: FAMILY MEDICINE

## 2022-01-19 PROCEDURE — 1160F PR REVIEW ALL MEDS BY PRESCRIBER/CLIN PHARMACIST DOCUMENTED: ICD-10-PCS | Mod: CPTII,S$GLB,, | Performed by: FAMILY MEDICINE

## 2022-01-19 PROCEDURE — 1126F AMNT PAIN NOTED NONE PRSNT: CPT | Mod: CPTII,S$GLB,, | Performed by: FAMILY MEDICINE

## 2022-01-19 PROCEDURE — 3078F DIAST BP <80 MM HG: CPT | Mod: CPTII,S$GLB,, | Performed by: FAMILY MEDICINE

## 2022-01-19 PROCEDURE — 99214 OFFICE O/P EST MOD 30 MIN: CPT | Mod: 25,S$GLB,, | Performed by: FAMILY MEDICINE

## 2022-01-19 PROCEDURE — 3288F PR FALLS RISK ASSESSMENT DOCUMENTED: ICD-10-PCS | Mod: CPTII,S$GLB,, | Performed by: FAMILY MEDICINE

## 2022-01-19 PROCEDURE — 3078F PR MOST RECENT DIASTOLIC BLOOD PRESSURE < 80 MM HG: ICD-10-PCS | Mod: CPTII,S$GLB,, | Performed by: FAMILY MEDICINE

## 2022-01-19 PROCEDURE — 99999 PR PBB SHADOW E&M-EST. PATIENT-LVL V: ICD-10-PCS | Mod: PBBFAC,,, | Performed by: FAMILY MEDICINE

## 2022-01-19 PROCEDURE — G0008 ADMIN INFLUENZA VIRUS VAC: HCPCS | Mod: S$GLB,,, | Performed by: FAMILY MEDICINE

## 2022-01-19 PROCEDURE — 3288F FALL RISK ASSESSMENT DOCD: CPT | Mod: CPTII,S$GLB,, | Performed by: FAMILY MEDICINE

## 2022-01-19 PROCEDURE — 1100F PTFALLS ASSESS-DOCD GE2>/YR: CPT | Mod: CPTII,S$GLB,, | Performed by: FAMILY MEDICINE

## 2022-01-19 PROCEDURE — 1111F PR DISCHARGE MEDS RECONCILED W/ CURRENT OUTPATIENT MED LIST: ICD-10-PCS | Mod: CPTII,S$GLB,, | Performed by: FAMILY MEDICINE

## 2022-01-19 PROCEDURE — 1160F RVW MEDS BY RX/DR IN RCRD: CPT | Mod: CPTII,S$GLB,, | Performed by: FAMILY MEDICINE

## 2022-01-19 PROCEDURE — 3074F PR MOST RECENT SYSTOLIC BLOOD PRESSURE < 130 MM HG: ICD-10-PCS | Mod: CPTII,S$GLB,, | Performed by: FAMILY MEDICINE

## 2022-01-19 PROCEDURE — 99214 PR OFFICE/OUTPT VISIT, EST, LEVL IV, 30-39 MIN: ICD-10-PCS | Mod: 25,S$GLB,, | Performed by: FAMILY MEDICINE

## 2022-01-19 NOTE — PROGRESS NOTES
Subjective:       Patient ID: Kamar Muñoz is a 76 y.o. male.    Chief Complaint: hospital f/u     Patient Active Problem List   Diagnosis    Overweight (BMI 25.0-29.9)    Hypertension associated with diabetes    Proteinuria    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    Type 2 diabetes mellitus with hyperglycemia, with long-term current use of insulin    Personal history of unspecified digestive disease    Renal stone    Flank pain    Cervical radiculopathy    Osteoarthritis of cervical spine    Arthropathy of cervical facet joint    Cervical facet joint syndrome    Centrilobular emphysema    Nasal congestion    Cough    Personal history of colonic polyps    Chronic pulmonary heart disease    PETTY (dyspnea on exertion)    Pulmonary nodules    Type 2 diabetes mellitus with diabetic peripheral angiopathy without gangrene, with long-term current use of insulin    Coronary artery disease involving native coronary artery of native heart with unstable angina pectoris    STEMI involving right coronary artery    Embolic stroke involving right middle cerebral artery    Afib    BRENDAN (acute kidney injury)    Embolic stroke involving left cerebellar artery    Dysarthria and anarthria    Stented coronary artery    Coronary artery disease involving native heart without angina pectoris    History of ST elevation myocardial infarction (STEMI)      HPI  75 yo male presents for hospital follow up.   History of HTN and uncontrolled DM and up until 1/2022, working full time. 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 Has children locally who are helping.     STEMI with x 2 stent placements on 1/9/2022. Shortly after coming home from hospital, new onset stroke symptoms. Evaluated by EMT and patient declined to be taken to the hospital. Delayed arrival to ED a day later, neurovascular eval and CT showed subacute embolic strokes. Also with EKG with Afib, and DKA at  presentation.    At time of discharge, inpatient rehab was recommended, but per patient's daughter, paperwork completed when patient initially turned down rehab. Family and patient later wanted to pursue rehab, but at that time already discharged in the system. Interested in rehab at this time. Discussed that it may be difficult to arrange for transition to inpatient rehab once discharged, but can start ST, PT, OT.     DM - 12.3% A1C on 12/29/2021    -Reports having meds set up per discharge paperwork, but unable to verbally verify. On Basal/bolus regimen of insulin.   - Plan for Eliquis 5 mg BID, ASA 81 mg, plavix, amiodarone, atorvastatin, diltiazem, losartan, metformin (GFR >60)    At discharge there was ordered for PT/OT through home health and SLP, for [facility] home [facility]. Unclear if was not in network but has not established contact.     Since at home reported by daughter concerns:  -Noticing that he has some trouble with swallowing. No clear aspiration, but some coughing.  Speech is improving but low volume voice at time.  - Reports gait is unstable, but patient declined assistive device. Reports there is walker and cane available in house that belongs to his wife. No falls reported. Daughter states walk at times can be shuffle but also has moments with better foot clearance.     - Urinary urgency - reports not able to make it to the bathroom and unable to hold urine though sensation of needing to urinate is present.     Review of Systems   All other systems reviewed and are negative.         Objective:     Vitals:    01/19/22 1502   BP: 110/62        Physical Exam  Vitals and nursing note reviewed.   Constitutional:       General: He is not in acute distress.     Appearance: Normal appearance. He is not ill-appearing, toxic-appearing or diaphoretic.   HENT:      Head: Normocephalic and atraumatic.   Eyes:      General: No scleral icterus.     Conjunctiva/sclera: Conjunctivae normal.   Cardiovascular:       Rate and Rhythm: Normal rate.      Heart sounds: Murmur heard.       Pulmonary:      Effort: Pulmonary effort is normal. No respiratory distress.      Breath sounds: Normal breath sounds.   Abdominal:      General: Bowel sounds are normal.   Skin:     Coloration: Skin is not pale.   Neurological:      Mental Status: He is alert. Mental status is at baseline.      Comments: Left sided weakness. Ambulates without assistive device   Psychiatric:         Attention and Perception: Attention and perception normal.         Mood and Affect: Mood and affect normal.         Speech: Speech normal.         Behavior: Behavior normal.         Cognition and Memory: Cognition and memory normal.         Judgment: Judgment normal.         Assessment:       1. Coronary artery disease involving native heart without angina pectoris, unspecified vessel or lesion type    2. History of cerebrovascular accident (CVA) with residual deficit    3. Stented coronary artery    4. History of ST elevation myocardial infarction (STEMI)    5. Hospital discharge follow-up    6. Immunization due    7. Type 2 diabetes mellitus with hyperglycemia, with long-term current use of insulin        Plan:         Coronary artery disease involving native heart without angina pectoris, unspecified vessel or lesion type  -     Ambulatory referral/consult to Home Health; Future; Expected date: 01/20/2022    History of cerebrovascular accident (CVA) with residual deficit  -     Ambulatory referral/consult to Neurology; Future; Expected date: 01/26/2022  -     Ambulatory referral/consult to Home Health; Future; Expected date: 01/20/2022    Stented coronary artery    History of ST elevation myocardial infarction (STEMI)  -     Ambulatory referral/consult to Home Health; Future; Expected date: 01/20/2022    Hospital discharge follow-up    Immunization due  -     Influenza (FLUAD) - Quadrivalent (Adjuvanted) *Preferred* (65+) (PF)    Type 2 diabetes mellitus with  hyperglycemia, with long-term current use of insulin  -     Ambulatory referral/consult to Diabetes Education; Future; Expected date: 01/27/2022  -     Hemoglobin A1C; Future; Expected date: 01/20/2022      Bring all meds to follow up visit in 2-4 weeks with glucose logs.   Reviewed series of events and discharge paperwork with patient. Home health order placed for ST, PT, OT. Anticipatory guidance about coping with change in function and potential adjustment mood disorder discussed. BP controlled, continue monitoring. Plan for f/u with Cardiology, Neuro.     Patient's questions answered. Plan reviewed with patient at the end of visit. Relevant precautions to chief complaint and reasons to seek medical care or contact the office sooner reviewed with patient.     Follow up for w/ PCP (already scheduled).       Future Appointments   Date Time Provider Department Center   1/31/2022  2:30 PM CARDIAC, PET IMAGING [facility]   2/2/2022  1:40 PM Constantine Seay MD [facility] CARDIO [facility]   3/8/2022 11:00 AM Gaurav Calvillo DO [facility] NEURO [facility]   4/6/2022  1:00 PM Basim Guerrero MD [facility]

## 2022-01-19 NOTE — Clinical Note
Can you schedule him a primary care follow up in 2-4 weeks to review glucose logs and medications. Please have them bring medications.  Can be with myself or Dr. Sheth. A1C prior to appt.

## 2022-01-20 PROBLEM — E11.10 DIABETIC KETOACIDOSIS WITHOUT COMA ASSOCIATED WITH TYPE 2 DIABETES MELLITUS: Status: RESOLVED | Noted: 2022-01-12 | Resolved: 2022-01-20

## 2022-01-21 ENCOUNTER — TELEPHONE (OUTPATIENT)
Dept: FAMILY MEDICINE | Facility: CLINIC | Age: 77
End: 2022-01-21
Payer: MEDICARE

## 2022-01-21 ENCOUNTER — TELEPHONE (OUTPATIENT)
Dept: DIABETES | Facility: CLINIC | Age: 77
End: 2022-01-21
Payer: MEDICARE

## 2022-01-21 ENCOUNTER — PATIENT MESSAGE (OUTPATIENT)
Dept: FAMILY MEDICINE | Facility: CLINIC | Age: 77
End: 2022-01-21
Payer: MEDICARE

## 2022-01-21 NOTE — TELEPHONE ENCOUNTER
Wife states patient is voiding, but no food intake since yesterday. Instructed to make sure he is taking his Protonix. Also instructed to get him some sugar free popcycles, pedialyte and keep up his fluid intake. Gradually start back on solid food tomorrow. If no better, not keeping anything down, and/or not eating, will take back to ED for evaluation. Verbalized understanding.

## 2022-01-21 NOTE — TELEPHONE ENCOUNTER
----- Message from Vida Fuchs sent at 1/21/2022  2:57 PM CST -----  Type: Patient Call Back    Who called:Valeri    What is the request in detail: pt wife is asking for a call back in regards of her  .the patient states he is still not eating... Please call pt    Can the clinic reply by [facility]?    Would the patient rather a call back or a response via [facility]? Call    Best call back number:.[phone]

## 2022-01-21 NOTE — TELEPHONE ENCOUNTER
----- Message from Dorcas German sent at 1/21/2022 11:56 AM CST -----  Regarding: Call back  Contact: [phone]  Type:  Needs Medical Advice    Who Called: PT   Symptoms (please be specific): Possible heart burn nausea middle stomach pain and not eating   How long has patient had these symptoms:  yesterday   Would the patient rather a call back or a response via [facility]? Call back   Best Call Back Number:  [phone]  Additional Information: patient was seen yesterday with Dr. Brittani Weems

## 2022-01-24 PROCEDURE — G0180 MD CERTIFICATION HHA PATIENT: HCPCS | Mod: ,,, | Performed by: FAMILY MEDICINE

## 2022-01-24 PROCEDURE — G0180 PR HOME HEALTH MD CERTIFICATION: ICD-10-PCS | Mod: ,,, | Performed by: FAMILY MEDICINE

## 2022-01-24 NOTE — TELEPHONE ENCOUNTER
Spoke to Angy and verified Edward medication list. Patient is currently on Eliquis, Asprin, Plavix

## 2022-01-25 ENCOUNTER — HOSPITAL ENCOUNTER (INPATIENT)
Facility: HOSPITAL | Age: 77
LOS: 8 days | Discharge: NURSING, REHAB OR LONG-TERM CARE | DRG: 637 | End: 2022-02-02
Attending: EMERGENCY MEDICINE | Admitting: INTERNAL MEDICINE
Payer: MEDICARE

## 2022-01-25 DIAGNOSIS — I25.10 CORONARY ARTERY DISEASE INVOLVING NATIVE HEART WITHOUT ANGINA PECTORIS, UNSPECIFIED VESSEL OR LESION TYPE: ICD-10-CM

## 2022-01-25 DIAGNOSIS — E11.65 TYPE 2 DIABETES MELLITUS WITH HYPERGLYCEMIA, WITH LONG-TERM CURRENT USE OF INSULIN: ICD-10-CM

## 2022-01-25 DIAGNOSIS — E11.00 HYPEROSMOLAR HYPERGLYCEMIC STATE (HHS): ICD-10-CM

## 2022-01-25 DIAGNOSIS — I48.0 PAROXYSMAL ATRIAL FIBRILLATION: ICD-10-CM

## 2022-01-25 DIAGNOSIS — I15.2 HYPERTENSION ASSOCIATED WITH DIABETES: ICD-10-CM

## 2022-01-25 DIAGNOSIS — Z79.4 TYPE 2 DIABETES MELLITUS WITH STAGE 3 CHRONIC KIDNEY DISEASE, WITH LONG-TERM CURRENT USE OF INSULIN: ICD-10-CM

## 2022-01-25 DIAGNOSIS — I63.411 EMBOLIC STROKE INVOLVING RIGHT MIDDLE CEREBRAL ARTERY: ICD-10-CM

## 2022-01-25 DIAGNOSIS — I21.9 MYOCARDIAL INFARCTION: ICD-10-CM

## 2022-01-25 DIAGNOSIS — E11.59 HYPERTENSION ASSOCIATED WITH DIABETES: ICD-10-CM

## 2022-01-25 DIAGNOSIS — G93.41 ENCEPHALOPATHY, METABOLIC: ICD-10-CM

## 2022-01-25 DIAGNOSIS — I25.10 CAD (CORONARY ARTERY DISEASE): ICD-10-CM

## 2022-01-25 DIAGNOSIS — R73.9 HYPERGLYCEMIA: ICD-10-CM

## 2022-01-25 DIAGNOSIS — R29.810 FACIAL DROOP: ICD-10-CM

## 2022-01-25 DIAGNOSIS — I21.11 STEMI INVOLVING RIGHT CORONARY ARTERY: ICD-10-CM

## 2022-01-25 DIAGNOSIS — I63.9 STROKE: ICD-10-CM

## 2022-01-25 DIAGNOSIS — E10.9 KETOSIS-PRONE DIABETES MELLITUS: ICD-10-CM

## 2022-01-25 DIAGNOSIS — N17.9 AKI (ACUTE KIDNEY INJURY): ICD-10-CM

## 2022-01-25 DIAGNOSIS — R41.82 ALTERED MENTAL STATUS, UNSPECIFIED ALTERED MENTAL STATUS TYPE: Primary | ICD-10-CM

## 2022-01-25 DIAGNOSIS — R47.1 DYSARTHRIA AND ANARTHRIA: ICD-10-CM

## 2022-01-25 DIAGNOSIS — E11.10 DIABETIC KETOACIDOSIS WITHOUT COMA ASSOCIATED WITH TYPE 2 DIABETES MELLITUS: ICD-10-CM

## 2022-01-25 DIAGNOSIS — E87.5 HYPERKALEMIA: ICD-10-CM

## 2022-01-25 DIAGNOSIS — I63.442 EMBOLIC STROKE INVOLVING LEFT CEREBELLAR ARTERY: ICD-10-CM

## 2022-01-25 DIAGNOSIS — R79.89 ELEVATED TROPONIN: ICD-10-CM

## 2022-01-25 DIAGNOSIS — I21.4 NSTEMI (NON-ST ELEVATED MYOCARDIAL INFARCTION): ICD-10-CM

## 2022-01-25 DIAGNOSIS — E11.22 TYPE 2 DIABETES MELLITUS WITH STAGE 3 CHRONIC KIDNEY DISEASE, WITH LONG-TERM CURRENT USE OF INSULIN: ICD-10-CM

## 2022-01-25 DIAGNOSIS — R56.9 FOCAL SEIZURES: ICD-10-CM

## 2022-01-25 DIAGNOSIS — Z79.4 TYPE 2 DIABETES MELLITUS WITH HYPERGLYCEMIA, WITH LONG-TERM CURRENT USE OF INSULIN: ICD-10-CM

## 2022-01-25 DIAGNOSIS — Z86.73 HISTORY OF ISCHEMIC STROKE IN PRIOR THREE MONTHS: ICD-10-CM

## 2022-01-25 DIAGNOSIS — E11.11 DIABETIC KETOACIDOSIS WITH COMA ASSOCIATED WITH TYPE 2 DIABETES MELLITUS: ICD-10-CM

## 2022-01-25 DIAGNOSIS — N18.30 TYPE 2 DIABETES MELLITUS WITH STAGE 3 CHRONIC KIDNEY DISEASE, WITH LONG-TERM CURRENT USE OF INSULIN: ICD-10-CM

## 2022-01-25 DIAGNOSIS — G93.41 ACUTE METABOLIC ENCEPHALOPATHY: ICD-10-CM

## 2022-01-25 PROBLEM — R29.898 LEFT ARM WEAKNESS: Status: ACTIVE | Noted: 2022-01-25

## 2022-01-25 LAB
ALBUMIN SERPL BCP-MCNC: 3 G/DL (ref 3.5–5.2)
ALLENS TEST: ABNORMAL
ALLENS TEST: ABNORMAL
ALP SERPL-CCNC: 124 U/L (ref 55–135)
ALT SERPL W/O P-5'-P-CCNC: 15 U/L (ref 10–44)
ANION GAP SERPL CALC-SCNC: 21 MMOL/L (ref 8–16)
ANION GAP SERPL CALC-SCNC: 29 MMOL/L (ref 8–16)
ANION GAP SERPL CALC-SCNC: 33 MMOL/L (ref 8–16)
ANION GAP SERPL CALC-SCNC: 35 MMOL/L (ref 8–16)
ANION GAP SERPL CALC-SCNC: ABNORMAL MMOL/L (ref 8–16)
APTT BLDCRRT: 21.2 SEC (ref 21–32)
AST SERPL-CCNC: 14 U/L (ref 10–40)
B-OH-BUTYR BLD STRIP-SCNC: 4.8 MMOL/L (ref 0–0.5)
BACTERIA #/AREA URNS AUTO: ABNORMAL /HPF
BASOPHILS # BLD AUTO: 0.05 K/UL (ref 0–0.2)
BASOPHILS NFR BLD: 0.3 % (ref 0–1.9)
BILIRUB SERPL-MCNC: 0.6 MG/DL (ref 0.1–1)
BILIRUB UR QL STRIP: NEGATIVE
BNP SERPL-MCNC: 658 PG/ML (ref 0–99)
BUN SERPL-MCNC: 39 MG/DL (ref 8–23)
BUN SERPL-MCNC: 40 MG/DL (ref 6–30)
BUN SERPL-MCNC: 40 MG/DL (ref 8–23)
BUN SERPL-MCNC: 41 MG/DL (ref 6–30)
BUN SERPL-MCNC: 41 MG/DL (ref 8–23)
BUN SERPL-MCNC: 41 MG/DL (ref 8–23)
BUN SERPL-MCNC: 43 MG/DL (ref 6–30)
BUN SERPL-MCNC: 44 MG/DL (ref 8–23)
BUN SERPL-MCNC: 49 MG/DL (ref 6–30)
CALCIUM SERPL-MCNC: 8.5 MG/DL (ref 8.7–10.5)
CALCIUM SERPL-MCNC: 8.7 MG/DL (ref 8.7–10.5)
CALCIUM SERPL-MCNC: 8.7 MG/DL (ref 8.7–10.5)
CALCIUM SERPL-MCNC: 8.8 MG/DL (ref 8.7–10.5)
CALCIUM SERPL-MCNC: 8.9 MG/DL (ref 8.7–10.5)
CHLORIDE SERPL-SCNC: 100 MMOL/L (ref 95–110)
CHLORIDE SERPL-SCNC: 102 MMOL/L (ref 95–110)
CHLORIDE SERPL-SCNC: 103 MMOL/L (ref 95–110)
CHLORIDE SERPL-SCNC: 105 MMOL/L (ref 95–110)
CHLORIDE SERPL-SCNC: 106 MMOL/L (ref 95–110)
CHLORIDE SERPL-SCNC: 106 MMOL/L (ref 95–110)
CHLORIDE SERPL-SCNC: 96 MMOL/L (ref 95–110)
CHLORIDE SERPL-SCNC: 97 MMOL/L (ref 95–110)
CHLORIDE SERPL-SCNC: 98 MMOL/L (ref 95–110)
CHOLEST SERPL-MCNC: 182 MG/DL (ref 120–199)
CHOLEST/HDLC SERPL: 3.6 {RATIO} (ref 2–5)
CLARITY UR REFRACT.AUTO: ABNORMAL
CO2 SERPL-SCNC: 10 MMOL/L (ref 23–29)
CO2 SERPL-SCNC: 5 MMOL/L (ref 23–29)
CO2 SERPL-SCNC: 5 MMOL/L (ref 23–29)
CO2 SERPL-SCNC: 7 MMOL/L (ref 23–29)
CO2 SERPL-SCNC: <5 MMOL/L (ref 23–29)
COLOR UR AUTO: YELLOW
CREAT SERPL-MCNC: 2.4 MG/DL (ref 0.5–1.4)
CREAT SERPL-MCNC: 2.4 MG/DL (ref 0.5–1.4)
CREAT SERPL-MCNC: 2.5 MG/DL (ref 0.5–1.4)
CREAT SERPL-MCNC: 2.5 MG/DL (ref 0.5–1.4)
CREAT SERPL-MCNC: 2.8 MG/DL (ref 0.5–1.4)
CREAT SERPL-MCNC: 3 MG/DL (ref 0.5–1.4)
CREAT SERPL-MCNC: 3.2 MG/DL (ref 0.5–1.4)
CREAT SERPL-MCNC: 3.4 MG/DL (ref 0.5–1.4)
CREAT SERPL-MCNC: 3.5 MG/DL (ref 0.5–1.4)
CREAT SERPL-MCNC: 3.8 MG/DL (ref 0.5–1.4)
CTP QC/QA: YES
DELSYS: ABNORMAL
DELSYS: ABNORMAL
DIFFERENTIAL METHOD: ABNORMAL
EOSINOPHIL # BLD AUTO: 0 K/UL (ref 0–0.5)
EOSINOPHIL NFR BLD: 0.1 % (ref 0–8)
ERYTHROCYTE [DISTWIDTH] IN BLOOD BY AUTOMATED COUNT: 14.7 % (ref 11.5–14.5)
EST. GFR  (AFRICAN AMERICAN): 16.5 ML/MIN/1.73 M^2
EST. GFR  (AFRICAN AMERICAN): 18.2 ML/MIN/1.73 M^2
EST. GFR  (AFRICAN AMERICAN): 18.9 ML/MIN/1.73 M^2
EST. GFR  (AFRICAN AMERICAN): 20.3 ML/MIN/1.73 M^2
EST. GFR  (AFRICAN AMERICAN): 22 ML/MIN/1.73 M^2
EST. GFR  (NON AFRICAN AMERICAN): 14.3 ML/MIN/1.73 M^2
EST. GFR  (NON AFRICAN AMERICAN): 15.8 ML/MIN/1.73 M^2
EST. GFR  (NON AFRICAN AMERICAN): 16.3 ML/MIN/1.73 M^2
EST. GFR  (NON AFRICAN AMERICAN): 17.6 ML/MIN/1.73 M^2
EST. GFR  (NON AFRICAN AMERICAN): 19 ML/MIN/1.73 M^2
GLUCOSE SERPL-MCNC: 467 MG/DL (ref 70–110)
GLUCOSE SERPL-MCNC: 524 MG/DL (ref 70–110)
GLUCOSE SERPL-MCNC: 606 MG/DL (ref 70–110)
GLUCOSE SERPL-MCNC: 608 MG/DL (ref 70–110)
GLUCOSE SERPL-MCNC: 757 MG/DL (ref 70–110)
GLUCOSE SERPL-MCNC: 831 MG/DL (ref 70–110)
GLUCOSE SERPL-MCNC: 831 MG/DL (ref 70–110)
GLUCOSE SERPL-MCNC: 878 MG/DL (ref 70–110)
GLUCOSE SERPL-MCNC: >700 MG/DL (ref 70–110)
GLUCOSE UR QL STRIP: ABNORMAL
HCO3 UR-SCNC: 4.3 MMOL/L (ref 24–28)
HCO3 UR-SCNC: 6.3 MMOL/L (ref 24–28)
HCT VFR BLD AUTO: 45.5 % (ref 40–54)
HCT VFR BLD CALC: 38 %PCV (ref 36–54)
HCT VFR BLD CALC: 39 %PCV (ref 36–54)
HCT VFR BLD CALC: 39 %PCV (ref 36–54)
HCT VFR BLD CALC: 45 %PCV (ref 36–54)
HCT VFR BLD CALC: 47 %PCV (ref 36–54)
HDLC SERPL-MCNC: 50 MG/DL (ref 40–75)
HDLC SERPL: 27.5 % (ref 20–50)
HGB BLD-MCNC: 13.6 G/DL (ref 14–18)
HGB UR QL STRIP: ABNORMAL
IMM GRANULOCYTES # BLD AUTO: 0.07 K/UL (ref 0–0.04)
IMM GRANULOCYTES NFR BLD AUTO: 0.5 % (ref 0–0.5)
INR PPP: 1 (ref 0.8–1.2)
KETONES UR QL STRIP: ABNORMAL
LACTATE SERPL-SCNC: 10.5 MMOL/L (ref 0.5–2.2)
LACTATE SERPL-SCNC: 7 MMOL/L (ref 0.5–2.2)
LDLC SERPL CALC-MCNC: 89 MG/DL (ref 63–159)
LEUKOCYTE ESTERASE UR QL STRIP: NEGATIVE
LYMPHOCYTES # BLD AUTO: 2 K/UL (ref 1–4.8)
LYMPHOCYTES NFR BLD: 12.9 % (ref 18–48)
MAGNESIUM SERPL-MCNC: 2 MG/DL (ref 1.6–2.6)
MCH RBC QN AUTO: 28.3 PG (ref 27–31)
MCHC RBC AUTO-ENTMCNC: 29.9 G/DL (ref 32–36)
MCV RBC AUTO: 95 FL (ref 82–98)
MICROSCOPIC COMMENT: ABNORMAL
MODE: ABNORMAL
MODE: ABNORMAL
MONOCYTES # BLD AUTO: 0.6 K/UL (ref 0.3–1)
MONOCYTES NFR BLD: 3.7 % (ref 4–15)
NEUTROPHILS # BLD AUTO: 12.8 K/UL (ref 1.8–7.7)
NEUTROPHILS NFR BLD: 82.5 % (ref 38–73)
NITRITE UR QL STRIP: NEGATIVE
NONHDLC SERPL-MCNC: 132 MG/DL
NRBC BLD-RTO: 0 /100 WBC
PCO2 BLDA: 15.6 MMHG (ref 35–45)
PCO2 BLDA: 17.2 MMHG (ref 35–45)
PH SMN: 7.01 [PH] (ref 7.35–7.45)
PH SMN: 7.22 [PH] (ref 7.35–7.45)
PH UR STRIP: 5 [PH] (ref 5–8)
PLATELET # BLD AUTO: 397 K/UL (ref 150–450)
PMV BLD AUTO: 9.8 FL (ref 9.2–12.9)
PO2 BLDA: 54 MMHG (ref 40–60)
PO2 BLDA: 80 MMHG (ref 40–60)
POC BE: -21 MMOL/L
POC BE: -27 MMOL/L
POC IONIZED CALCIUM: 1.06 MMOL/L (ref 1.06–1.42)
POC IONIZED CALCIUM: 1.1 MMOL/L (ref 1.06–1.42)
POC IONIZED CALCIUM: 1.12 MMOL/L (ref 1.06–1.42)
POC IONIZED CALCIUM: 1.18 MMOL/L (ref 1.06–1.42)
POC IONIZED CALCIUM: 1.21 MMOL/L (ref 1.06–1.42)
POC PTINR: 1.8 (ref 0.9–1.2)
POC PTWBT: 20.7 SEC (ref 9.7–14.3)
POC SATURATED O2: 83 % (ref 95–100)
POC SATURATED O2: 88 % (ref 95–100)
POC TCO2 (MEASURED): 11 MMOL/L (ref 23–29)
POC TCO2 (MEASURED): 14 MMOL/L (ref 23–29)
POC TCO2 (MEASURED): 18 MMOL/L (ref 23–29)
POC TCO2 (MEASURED): 7 MMOL/L (ref 23–29)
POC TCO2: 7 MMOL/L (ref 24–29)
POC TCO2: <5 MMOL/L (ref 24–29)
POCT GLUCOSE: 338 MG/DL (ref 70–110)
POCT GLUCOSE: 483 MG/DL (ref 70–110)
POCT GLUCOSE: >500 MG/DL (ref 70–110)
POTASSIUM BLD-SCNC: 4 MMOL/L (ref 3.5–5.1)
POTASSIUM BLD-SCNC: 4.1 MMOL/L (ref 3.5–5.1)
POTASSIUM BLD-SCNC: 4.3 MMOL/L (ref 3.5–5.1)
POTASSIUM BLD-SCNC: 6.2 MMOL/L (ref 3.5–5.1)
POTASSIUM BLD-SCNC: 6.3 MMOL/L (ref 3.5–5.1)
POTASSIUM SERPL-SCNC: 4.4 MMOL/L (ref 3.5–5.1)
POTASSIUM SERPL-SCNC: 4.9 MMOL/L (ref 3.5–5.1)
POTASSIUM SERPL-SCNC: 5.9 MMOL/L (ref 3.5–5.1)
POTASSIUM SERPL-SCNC: 6.2 MMOL/L (ref 3.5–5.1)
POTASSIUM SERPL-SCNC: 6.4 MMOL/L (ref 3.5–5.1)
PROT SERPL-MCNC: 7 G/DL (ref 6–8.4)
PROT UR QL STRIP: NEGATIVE
PROTHROMBIN TIME: 10.9 SEC (ref 9–12.5)
RBC # BLD AUTO: 4.81 M/UL (ref 4.6–6.2)
RBC #/AREA URNS AUTO: 5 /HPF (ref 0–4)
SAMPLE: ABNORMAL
SARS-COV-2 RDRP RESP QL NAA+PROBE: NEGATIVE
SITE: ABNORMAL
SITE: ABNORMAL
SODIUM BLD-SCNC: 132 MMOL/L (ref 136–145)
SODIUM BLD-SCNC: 133 MMOL/L (ref 136–145)
SODIUM BLD-SCNC: 138 MMOL/L (ref 136–145)
SODIUM BLD-SCNC: 140 MMOL/L (ref 136–145)
SODIUM BLD-SCNC: 140 MMOL/L (ref 136–145)
SODIUM SERPL-SCNC: 133 MMOL/L (ref 136–145)
SODIUM SERPL-SCNC: 135 MMOL/L (ref 136–145)
SODIUM SERPL-SCNC: 135 MMOL/L (ref 136–145)
SODIUM SERPL-SCNC: 136 MMOL/L (ref 136–145)
SODIUM SERPL-SCNC: 136 MMOL/L (ref 136–145)
SP GR UR STRIP: 1.02 (ref 1–1.03)
TRIGL SERPL-MCNC: 215 MG/DL (ref 30–150)
TROPONIN I SERPL DL<=0.01 NG/ML-MCNC: 0.14 NG/ML (ref 0–0.03)
TROPONIN I SERPL DL<=0.01 NG/ML-MCNC: 1.07 NG/ML (ref 0–0.03)
TSH SERPL DL<=0.005 MIU/L-ACNC: 0.6 UIU/ML (ref 0.4–4)
URN SPEC COLLECT METH UR: ABNORMAL
WBC # BLD AUTO: 15.48 K/UL (ref 3.9–12.7)
WBC #/AREA URNS AUTO: 2 /HPF (ref 0–5)
YEAST UR QL AUTO: ABNORMAL

## 2022-01-25 PROCEDURE — 99292 PR CRITICAL CARE, ADDL 30 MIN: ICD-10-PCS | Mod: CS,,, | Performed by: EMERGENCY MEDICINE

## 2022-01-25 PROCEDURE — 96365 THER/PROPH/DIAG IV INF INIT: CPT

## 2022-01-25 PROCEDURE — 99223 PR INITIAL HOSPITAL CARE,LEVL III: ICD-10-PCS | Mod: ,,, | Performed by: PSYCHIATRY & NEUROLOGY

## 2022-01-25 PROCEDURE — 93010 ELECTROCARDIOGRAM REPORT: CPT | Mod: 76,,, | Performed by: INTERNAL MEDICINE

## 2022-01-25 PROCEDURE — 84484 ASSAY OF TROPONIN QUANT: CPT | Mod: 91 | Performed by: STUDENT IN AN ORGANIZED HEALTH CARE EDUCATION/TRAINING PROGRAM

## 2022-01-25 PROCEDURE — 82010 KETONE BODYS QUAN: CPT | Performed by: EMERGENCY MEDICINE

## 2022-01-25 PROCEDURE — 63600175 PHARM REV CODE 636 W HCPCS: Performed by: STUDENT IN AN ORGANIZED HEALTH CARE EDUCATION/TRAINING PROGRAM

## 2022-01-25 PROCEDURE — 25000003 PHARM REV CODE 250: Performed by: EMERGENCY MEDICINE

## 2022-01-25 PROCEDURE — 25000003 PHARM REV CODE 250: Performed by: STUDENT IN AN ORGANIZED HEALTH CARE EDUCATION/TRAINING PROGRAM

## 2022-01-25 PROCEDURE — 93005 ELECTROCARDIOGRAM TRACING: CPT

## 2022-01-25 PROCEDURE — 84443 ASSAY THYROID STIM HORMONE: CPT | Performed by: EMERGENCY MEDICINE

## 2022-01-25 PROCEDURE — 82330 ASSAY OF CALCIUM: CPT

## 2022-01-25 PROCEDURE — 84484 ASSAY OF TROPONIN QUANT: CPT | Performed by: EMERGENCY MEDICINE

## 2022-01-25 PROCEDURE — 93010 EKG 12-LEAD: ICD-10-PCS | Mod: ,,, | Performed by: INTERNAL MEDICINE

## 2022-01-25 PROCEDURE — 82803 BLOOD GASES ANY COMBINATION: CPT

## 2022-01-25 PROCEDURE — 80048 BASIC METABOLIC PNL TOTAL CA: CPT | Performed by: STUDENT IN AN ORGANIZED HEALTH CARE EDUCATION/TRAINING PROGRAM

## 2022-01-25 PROCEDURE — U0002 COVID-19 LAB TEST NON-CDC: HCPCS | Performed by: EMERGENCY MEDICINE

## 2022-01-25 PROCEDURE — 84295 ASSAY OF SERUM SODIUM: CPT

## 2022-01-25 PROCEDURE — 95700 EEG CONT REC W/VID EEG TECH: CPT

## 2022-01-25 PROCEDURE — 12000002 HC ACUTE/MED SURGE SEMI-PRIVATE ROOM

## 2022-01-25 PROCEDURE — 85730 THROMBOPLASTIN TIME PARTIAL: CPT | Performed by: STUDENT IN AN ORGANIZED HEALTH CARE EDUCATION/TRAINING PROGRAM

## 2022-01-25 PROCEDURE — 87449 NOS EACH ORGANISM AG IA: CPT | Performed by: STUDENT IN AN ORGANIZED HEALTH CARE EDUCATION/TRAINING PROGRAM

## 2022-01-25 PROCEDURE — 99291 CRITICAL CARE FIRST HOUR: CPT | Mod: ,,, | Performed by: PHYSICIAN ASSISTANT

## 2022-01-25 PROCEDURE — 99900035 HC TECH TIME PER 15 MIN (STAT)

## 2022-01-25 PROCEDURE — 85014 HEMATOCRIT: CPT

## 2022-01-25 PROCEDURE — 86850 RBC ANTIBODY SCREEN: CPT | Performed by: STUDENT IN AN ORGANIZED HEALTH CARE EDUCATION/TRAINING PROGRAM

## 2022-01-25 PROCEDURE — 87305 ASPERGILLUS AG IA: CPT | Performed by: STUDENT IN AN ORGANIZED HEALTH CARE EDUCATION/TRAINING PROGRAM

## 2022-01-25 PROCEDURE — C9399 UNCLASSIFIED DRUGS OR BIOLOG: HCPCS | Performed by: STUDENT IN AN ORGANIZED HEALTH CARE EDUCATION/TRAINING PROGRAM

## 2022-01-25 PROCEDURE — 99223 1ST HOSP IP/OBS HIGH 75: CPT | Mod: ,,, | Performed by: PSYCHIATRY & NEUROLOGY

## 2022-01-25 PROCEDURE — 84132 ASSAY OF SERUM POTASSIUM: CPT

## 2022-01-25 PROCEDURE — 81001 URINALYSIS AUTO W/SCOPE: CPT | Performed by: EMERGENCY MEDICINE

## 2022-01-25 PROCEDURE — 99291 PR CRITICAL CARE, E/M 30-74 MINUTES: ICD-10-PCS | Mod: ,,, | Performed by: PHYSICIAN ASSISTANT

## 2022-01-25 PROCEDURE — 86900 BLOOD TYPING SEROLOGIC ABO: CPT | Performed by: STUDENT IN AN ORGANIZED HEALTH CARE EDUCATION/TRAINING PROGRAM

## 2022-01-25 PROCEDURE — 95711 VEEG 2-12 HR UNMONITORED: CPT

## 2022-01-25 PROCEDURE — 82565 ASSAY OF CREATININE: CPT

## 2022-01-25 PROCEDURE — 85610 PROTHROMBIN TIME: CPT

## 2022-01-25 PROCEDURE — 93010 ELECTROCARDIOGRAM REPORT: CPT | Mod: ,,, | Performed by: INTERNAL MEDICINE

## 2022-01-25 PROCEDURE — 95720 EEG PHY/QHP EA INCR W/VEEG: CPT | Mod: ,,, | Performed by: PSYCHIATRY & NEUROLOGY

## 2022-01-25 PROCEDURE — 83605 ASSAY OF LACTIC ACID: CPT | Mod: 91 | Performed by: INTERNAL MEDICINE

## 2022-01-25 PROCEDURE — 87040 BLOOD CULTURE FOR BACTERIA: CPT | Mod: 59 | Performed by: EMERGENCY MEDICINE

## 2022-01-25 PROCEDURE — 83605 ASSAY OF LACTIC ACID: CPT | Performed by: STUDENT IN AN ORGANIZED HEALTH CARE EDUCATION/TRAINING PROGRAM

## 2022-01-25 PROCEDURE — 99291 CRITICAL CARE FIRST HOUR: CPT | Mod: CS,,, | Performed by: EMERGENCY MEDICINE

## 2022-01-25 PROCEDURE — 80053 COMPREHEN METABOLIC PANEL: CPT | Performed by: EMERGENCY MEDICINE

## 2022-01-25 PROCEDURE — 99291 CRITICAL CARE FIRST HOUR: CPT | Mod: 25

## 2022-01-25 PROCEDURE — 99291 PR CRITICAL CARE, E/M 30-74 MINUTES: ICD-10-PCS | Mod: CS,,, | Performed by: EMERGENCY MEDICINE

## 2022-01-25 PROCEDURE — 83735 ASSAY OF MAGNESIUM: CPT | Performed by: EMERGENCY MEDICINE

## 2022-01-25 PROCEDURE — 83880 ASSAY OF NATRIURETIC PEPTIDE: CPT | Performed by: EMERGENCY MEDICINE

## 2022-01-25 PROCEDURE — 80061 LIPID PANEL: CPT | Performed by: EMERGENCY MEDICINE

## 2022-01-25 PROCEDURE — 85025 COMPLETE CBC W/AUTO DIFF WBC: CPT | Performed by: EMERGENCY MEDICINE

## 2022-01-25 PROCEDURE — 80048 BASIC METABOLIC PNL TOTAL CA: CPT | Mod: 91 | Performed by: STUDENT IN AN ORGANIZED HEALTH CARE EDUCATION/TRAINING PROGRAM

## 2022-01-25 PROCEDURE — 85610 PROTHROMBIN TIME: CPT | Performed by: STUDENT IN AN ORGANIZED HEALTH CARE EDUCATION/TRAINING PROGRAM

## 2022-01-25 PROCEDURE — 95720 PR EEG, W/VIDEO, CONT RECORD, I&R, >12<26 HRS: ICD-10-PCS | Mod: ,,, | Performed by: PSYCHIATRY & NEUROLOGY

## 2022-01-25 PROCEDURE — 99292 CRITICAL CARE ADDL 30 MIN: CPT | Mod: CS,,, | Performed by: EMERGENCY MEDICINE

## 2022-01-25 RX ORDER — SODIUM CHLORIDE, SODIUM LACTATE, POTASSIUM CHLORIDE, CALCIUM CHLORIDE 600; 310; 30; 20 MG/100ML; MG/100ML; MG/100ML; MG/100ML
INJECTION, SOLUTION INTRAVENOUS CONTINUOUS
Status: DISCONTINUED | OUTPATIENT
Start: 2022-01-25 | End: 2022-01-25

## 2022-01-25 RX ORDER — HEPARIN SODIUM,PORCINE/D5W 25000/250
0-40 INTRAVENOUS SOLUTION INTRAVENOUS CONTINUOUS
Status: DISPENSED | OUTPATIENT
Start: 2022-01-26 | End: 2022-01-29

## 2022-01-25 RX ORDER — DEXTROSE MONOHYDRATE 100 MG/ML
INJECTION, SOLUTION INTRAVENOUS
Status: DISCONTINUED | OUTPATIENT
Start: 2022-01-25 | End: 2022-01-26

## 2022-01-25 RX ORDER — CEFEPIME HYDROCHLORIDE 1 G/50ML
2 INJECTION, SOLUTION INTRAVENOUS
Status: DISCONTINUED | OUTPATIENT
Start: 2022-01-25 | End: 2022-01-26

## 2022-01-25 RX ORDER — ATORVASTATIN CALCIUM 20 MG/1
80 TABLET, FILM COATED ORAL DAILY
Status: DISCONTINUED | OUTPATIENT
Start: 2022-01-25 | End: 2022-01-28

## 2022-01-25 RX ORDER — MUPIROCIN 20 MG/G
OINTMENT TOPICAL 2 TIMES DAILY
Status: DISCONTINUED | OUTPATIENT
Start: 2022-01-25 | End: 2022-01-26

## 2022-01-25 RX ORDER — VANCOMYCIN 2 GRAM/500 ML IN 0.9 % SODIUM CHLORIDE INTRAVENOUS
1750
Status: DISCONTINUED | OUTPATIENT
Start: 2022-01-25 | End: 2022-01-25 | Stop reason: SDUPTHER

## 2022-01-25 RX ORDER — VANCOMYCIN 1.75 GRAM/500 ML IN 0.9 % SODIUM CHLORIDE INTRAVENOUS
1750
Status: COMPLETED | OUTPATIENT
Start: 2022-01-25 | End: 2022-01-25

## 2022-01-25 RX ORDER — METOPROLOL SUCCINATE 25 MG/1
25 TABLET, EXTENDED RELEASE ORAL DAILY
Status: DISCONTINUED | OUTPATIENT
Start: 2022-01-26 | End: 2022-01-25

## 2022-01-25 RX ORDER — NAPROXEN SODIUM 220 MG/1
81 TABLET, FILM COATED ORAL DAILY
Status: DISCONTINUED | OUTPATIENT
Start: 2022-01-25 | End: 2022-01-28

## 2022-01-25 RX ORDER — METOPROLOL SUCCINATE 25 MG/1
25 TABLET, EXTENDED RELEASE ORAL DAILY
Status: DISCONTINUED | OUTPATIENT
Start: 2022-01-25 | End: 2022-01-26

## 2022-01-25 RX ORDER — NITROGLYCERIN 0.4 MG/1
0.4 TABLET SUBLINGUAL EVERY 5 MIN PRN
Status: DISCONTINUED | OUTPATIENT
Start: 2022-01-25 | End: 2022-02-02 | Stop reason: HOSPADM

## 2022-01-25 RX ORDER — CLOPIDOGREL 300 MG/1
300 TABLET, FILM COATED ORAL ONCE
Status: COMPLETED | OUTPATIENT
Start: 2022-01-25 | End: 2022-01-25

## 2022-01-25 RX ORDER — CLOPIDOGREL 300 MG/1
300 TABLET, FILM COATED ORAL ONCE
Status: DISCONTINUED | OUTPATIENT
Start: 2022-01-25 | End: 2022-01-25

## 2022-01-25 RX ORDER — CLOPIDOGREL BISULFATE 75 MG/1
75 TABLET ORAL DAILY
Status: DISCONTINUED | OUTPATIENT
Start: 2022-01-26 | End: 2022-01-28

## 2022-01-25 RX ORDER — ONDANSETRON 2 MG/ML
4 INJECTION INTRAMUSCULAR; INTRAVENOUS EVERY 8 HOURS PRN
Status: DISCONTINUED | OUTPATIENT
Start: 2022-01-25 | End: 2022-01-27

## 2022-01-25 RX ORDER — SODIUM CHLORIDE 0.9 % (FLUSH) 0.9 %
10 SYRINGE (ML) INJECTION
Status: DISCONTINUED | OUTPATIENT
Start: 2022-01-25 | End: 2022-02-02 | Stop reason: HOSPADM

## 2022-01-25 RX ORDER — MAGNESIUM SULFATE HEPTAHYDRATE 40 MG/ML
2 INJECTION, SOLUTION INTRAVENOUS ONCE
Status: COMPLETED | OUTPATIENT
Start: 2022-01-25 | End: 2022-01-26

## 2022-01-25 RX ADMIN — ATORVASTATIN CALCIUM 80 MG: 40 TABLET, FILM COATED ORAL at 11:01

## 2022-01-25 RX ADMIN — CLOPIDOGREL BISULFATE 300 MG: 300 TABLET, FILM COATED ORAL at 11:01

## 2022-01-25 RX ADMIN — MUPIROCIN: 20 OINTMENT TOPICAL at 10:01

## 2022-01-25 RX ADMIN — INSULIN HUMAN 3 UNITS/HR: 1 INJECTION, SOLUTION INTRAVENOUS at 12:01

## 2022-01-25 RX ADMIN — INSULIN DETEMIR 20 UNITS: 100 INJECTION, SOLUTION SUBCUTANEOUS at 09:01

## 2022-01-25 RX ADMIN — VANCOMYCIN 1.75 GRAM/500 ML IN 0.9 % SODIUM CHLORIDE INTRAVENOUS 1750 MG: at 06:01

## 2022-01-25 RX ADMIN — CALCIUM GLUCONATE 1 G: 98 INJECTION, SOLUTION INTRAVENOUS at 07:01

## 2022-01-25 RX ADMIN — METOPROLOL SUCCINATE 25 MG: 25 TABLET, EXTENDED RELEASE ORAL at 11:01

## 2022-01-25 RX ADMIN — MAGNESIUM SULFATE 2 G: 2 INJECTION INTRAVENOUS at 10:01

## 2022-01-25 RX ADMIN — SODIUM CHLORIDE 1000 ML: 0.9 INJECTION, SOLUTION INTRAVENOUS at 12:01

## 2022-01-25 RX ADMIN — SODIUM CHLORIDE, SODIUM LACTATE, POTASSIUM CHLORIDE, AND CALCIUM CHLORIDE 1000 ML: .6; .31; .03; .02 INJECTION, SOLUTION INTRAVENOUS at 06:01

## 2022-01-25 RX ADMIN — SODIUM CHLORIDE, SODIUM LACTATE, POTASSIUM CHLORIDE, AND CALCIUM CHLORIDE: .6; .31; .03; .02 INJECTION, SOLUTION INTRAVENOUS at 07:01

## 2022-01-25 RX ADMIN — SODIUM CHLORIDE, SODIUM LACTATE, POTASSIUM CHLORIDE, AND CALCIUM CHLORIDE 1000 ML: .6; .31; .03; .02 INJECTION, SOLUTION INTRAVENOUS at 04:01

## 2022-01-25 RX ADMIN — ASPIRIN 81 MG CHEWABLE TABLET 81 MG: 81 TABLET CHEWABLE at 11:01

## 2022-01-25 RX ADMIN — CEFEPIME HYDROCHLORIDE 2 G: 2 INJECTION, SOLUTION INTRAVENOUS at 05:01

## 2022-01-25 NOTE — PROGRESS NOTES
Pharmacokinetic Initial Assessment & Plan: IV Vancomycin      Initiate intravenous vancomycin with loading dose of 1750 mg x1. Subsequent doses based on random concentrations less than 20 mcg/mL  Draw vancomycin random level tomorrow on 01/26 at 0900.  Desired empiric serum trough concentration is 10 to 20 mcg/mL    Pharmacy will continue to follow and monitor vancomycin.    x [id number] with any questions regarding this assessment.     Thank you for the consult,   Arpan Pitts       Patient brief summary:  Kamar Muñoz is a 76 y.o. male initiated on antimicrobial therapy with IV Vancomycin for treatment of suspected bacteremia    Drug Allergies:   Review of patient's allergies indicates:   Allergen Reactions    Iodine      Other reaction(s): swelling  Other reaction(s): Itching  Other reaction(s): Rash       Actual Body Weight:   95.3 kg    Renal Function:   Estimated Creatinine Clearance: 20.2 mL/min (A) (based on SCr of 3.4 mg/dL (H)).,     Dialysis Method (if applicable):  N/A  Pt is in DKA with BRENDAN   CBC (last 72 hours):  Recent Labs   Lab Result Units 01/25/22  1213   WBC K/uL 15.48*   Hemoglobin g/dL 13.6*   Hematocrit % 45.5   Platelets K/uL 397   Gran % % 82.5*   Lymph % % 12.9*   Mono % % 3.7*   Eosinophil % % 0.1   Basophil % % 0.3   Differential Method  Automated       Metabolic Panel (last 72 hours):  Recent Labs   Lab Result Units 01/25/22  1213 01/25/22  1410   Sodium mmol/L 136 135*   Potassium mmol/L 6.2* 6.4*   Chloride mmol/L 96 97   CO2 mmol/L 5* 5*   Glucose mg/dL 831* 878*   BUN mg/dL 40* 41*   Creatinine mg/dL 3.2* 3.4*   Albumin g/dL 3.0*  --    Total Bilirubin mg/dL 0.6  --    Alkaline Phosphatase U/L 124  --    AST U/L 14  --    ALT U/L 15  --    Magnesium mg/dL 2.0  --        Drug levels (last 3 results):  No results for input(s): VANCOMYCINRA, VANCOMYCINPE, VANCOMYCINTR in the last 72 hours.    Microbiologic Results:  Microbiology Results (last 7 days)     Procedure Component Value  Units Date/Time    Blood culture #2 **CANNOT BE ORDERED STAT** [[id number]] Collected: 01/25/22 1145    Order Status: Sent Specimen: Blood from Peripheral, Antecubital, Left Updated: 01/25/22 1224    Blood culture #1 **CANNOT BE ORDERED STAT** [[id number]] Collected: 01/25/22 1214    Order Status: Sent Specimen: Blood from Peripheral, Antecubital, Left Updated: 01/25/22 1224

## 2022-01-25 NOTE — ASSESSMENT & PLAN NOTE
· [facility] on 1/9/22 with two vessel coronary artery disease with 100% occlusion of mid RCA and 70% stenosis of proximal LAD., RCA stent x2  - trop 0.143 today, no active chest pain, ecg with new LBBB and TWI in inferior leads, repeat trop/ecg pending  - trop was 12 twelve days ago  - trend trop/ecg  - holding home anticoagulation in setting of inability to tolerate PO as well as mild brain bleed noted on MRI

## 2022-01-25 NOTE — HPI
Mr. Muñoz is a 76 year old male with PMHx of CAD (s/p stent), A Fib (on Eliquis), DM, HLD, HTN, and prior stroke who presented to ED via EMS for slurred speech, L facial droop, and L arm weakness. Per patient's wife, he woke up at 7am and took a shower. He ambulates independently. Family called EMS because the patient developed AMS. He has one episode of emesis. He was last known normal at 8 am. On EMS arrival, patient was noted to be altered and dysarthric with LSW and L facial droop. Stroke code activated in route to ED. On arrival to hospital, patient somnolent, nonverbal, and not following commands. He was unable to provide history. History obtained from EMS.     Of note, per chart review, patient had been calling with complaints of acid reflux, abdominal pain, nausea (no vomiting), and poor oral intake which all began on 1/20. Patient's wife was advised to ensure adequate fluid intake, give protonix, and gradually add in solid foods. She was instructed to report to ED if patient did not improve.    Patient was seen via televascular neurology consults at [facility] on 1/13 for R MCA and L cerebellar stroke. Etiology cardioembolic 2/2 new onset afib. At that time, neuro exam revealed dysarthria and L facial droop.

## 2022-01-25 NOTE — HPI
Per chart review, 76-year-old male with a history of CAD, type 2 diabetes, hyperlipidemia, hypertension, recent stroke without residual deficits who presenting with slurred speech, left facial droop and left-sided weakness via EMS.  They were called for change in mental status and vomiting. On scene he had slurred speech, left-sided weakness and left facial droop.  FSBG greater than 400.  His last well known was approximately 8:00 a.m..  On arrival by EMS, he was able to converse however since his arrival to the ED, patient unable to provide any history, unable to converse, has left-sided facial droop and generalized weakness with left worse than right.

## 2022-01-25 NOTE — CONSULTS
[facility] Dept  Vascular Neurology  [facility]  Consult Note    Inpatient consult to Vascular (Stroke) Neurology  Consult performed by: Shahida Niño PA-C  Consult ordered by: Aly Mclean DO        Assessment/Plan:     Patient is a 76 y.o. year old male with:    Altered mental status  Kamar Muñoz is a 76 y.o. male with PMHx of CAD (s/p stent), A Fib (on Eliquis), DM, HLD, HTN, and prior stroke who presented to ED via EMS for AMS, slurred speech, L facial droop, and L arm weakness. Stroke code activated in route to ED. He was last known normal at 8 am. On arrival to hospital, patient somnolent, nonverbal, not following commands. He had LSW and L facial droop. Patient unable to receive tPA due to home eliquis. STAT CT without acute changes. Patient taken to MRI acute intervention due to Iodine allergy. MRI revealed previous infarcts with stable petechial hemorrhage, no LVO. At this time, there is low suspicion for cerebrovascular origin for patient's symptoms. Recommend exploring metabolic or infectious causes for AMS. Consider EEG.          History of stroke  Per chart review patient was seen for subacute infarct at [facility] on 1/14/22   R MCA and L cerebellar infarcts  New onset AF   Treatment plan for triple therapy given stent, delayed start date from discharge 2/2 petechial hemorrhage     Facial droop  Baseline from previous stroke    Coronary artery disease involving native heart without angina pectoris  S/p stent on ASA and Plavix at home     Dysarthria and anarthria  Baseline dysarthria from previous stroke    Paroxysmal atrial fibrillation  Stroke RF   Continue home Eliquis    Type 2 diabetes mellitus with diabetic peripheral angiopathy without gangrene, with long-term current use of insulin  Stroke RF   A1C 12/29/21 was 12.3%   POCT glucose >500  Recommend endocrine consult     Hypertension associated with diabetes  Stroke RF   SBP < 140    Overweight (BMI  25.0-29.9)   on diet and exercise when appropriate        STROKE DOCUMENTATION     Acute Stroke Times   Last Known Normal Date: 01/25/22  Last Known Normal Time: 0800  Symptom Onset Date: 01/25/22  Symptom Onset Time: 0800  Stroke Team Called Date: 01/25/22  Stroke Team Called Time: 1108  Stroke Team Arrival Date: 01/25/22  Stroke Team Arrival Time: 1108  CT Interpretation Time: 1122  Alteplase Recommended: No  Thrombectomy Recommended: No  MRI Acute Stroke Protocol Interpretation Time: 1155    NIH Scale:  1a. Level of Consciousness: 2-->Not alert, requires repeated stimulation to attend, or is obtunded and requires strong or painful stimulation to make movements (not stereotyped)  1b. LOC Questions: 2-->Answers neither question correctly  1c. LOC Commands: 2-->Performs neither task correctly  2. Best Gaze: 0-->Normal  3. Visual: 0-->No visual loss  4. Facial Palsy: 1-->Minor paralysis (flattened nasolabial fold, asymmetry on smiling)  5a. Motor Arm, Left: 1-->Drift, limb holds 90 (or 45) degrees, but drifts down before full 10 seconds, does not hit bed or other support  5b. Motor Arm, Right: 1-->Drift, limb holds 90 (or 45) degrees, but drifts down before full 10 secs, does not hit bed or other support  6a. Motor Leg, Left: 3-->No effort against gravity, leg falls to bed immediately  6b. Motor Leg, Right: 1-->Drift, leg falls by the end of the 5-sec period but does not hit bed  7. Limb Ataxia: 0-->Absent  8. Sensory: 2-->Severe to total sensory loss, patient is not aware of being touched in the face, arm, and leg  9. Best Language: 3-->Mute, global aphasia, no usable speech or auditory comprehension  10. Dysarthria: 2-->Severe dysarthria, patients speech is so slurred as to be unintelligible in the absence of or out of proportion to any dysphasia, or is mute/anarthric  11. Extinction and Inattention (formerly Neglect): 0-->No abnormality  Total (NIH Stroke Scale): 20    Modified [place] Score: 0  Patsy Coma  Scale:    ABCD2 Score:    [id number] Score:   HAS -BLED Score:   ICH Score:   Hunt & Alexander Classification:       Thrombolysis Candidate? No, Current use of direct thrombin inhibitors (dabigatran) or direct factor Xa inhibitors (rivaroxaban, apixaban, edoxaban) with elevated sensitive laboratory tests     Delays to Thrombolysis?  Not Applicable    Interventional Revascularization Candidate?   Is the patient eligible for mechanical endovascular reperfusion (MARY)?  No; No large vessel occlusion identified on imaging     Delays to Thrombectomy? Not Applicable    Hemorrhagic change of an Ischemic Stroke: Does this patient have an ischemic stroke with hemorrhagic changes? No     Subjective:     History of Present Illness:  Mr. Muñoz is a 76 year old male with PMHx of CAD (s/p stent), A Fib (on Eliquis), DM, HLD, HTN, and prior stroke who presented to ED via EMS for slurred speech, L facial droop, and L arm weakness. Per patient's wife, he woke up at 7am and took a shower. He ambulates independently. Family called EMS because the patient developed AMS. He has one episode of emesis. He was last known normal at 8 am. On EMS arrival, patient was noted to be altered and dysarthric with LSW and L facial droop. Stroke code activated in route to ED. On arrival to hospital, patient somnolent, nonverbal, and not following commands. He was unable to provide history. History obtained from EMS.     Of note, per chart review, patient had been calling with complaints of acid reflux, abdominal pain, nausea (no vomiting), and poor oral intake which all began on 1/20. Patient's wife was advised to ensure adequate fluid intake, give protonix, and gradually add in solid foods. She was instructed to report to ED if patient did not improve.    Patient was seen via televascular neurology consults at [facility] on 1/13 for R MCA and L cerebellar stroke. Etiology cardioembolic 2/2 new onset afib. At that time, neuro exam revealed dysarthria and  L facial droop.          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 2012    Type II or unspecified type diabetes mellitus with neurological manifestations, uncontrolled(250.62) 3/8/2013    Urinary tract infection      Past Surgical History:   Procedure Laterality Date    BACK SURGERY      CATARACT EXTRACTION W/  INTRAOCULAR LENS IMPLANT Right     Per Dr Romero note 2018    COLONOSCOPY N/A 2019    Procedure: COLONOSCOPY Suprep;  Surgeon: Anh Johnson MD;  Location: [facility] ENDO;  Service: Endoscopy;  Laterality: N/A;    EYE SURGERY      HERNIA REPAIR      LEFT HEART CATHETERIZATION Left 2022    Procedure: CATHETERIZATION, HEART, LEFT;  Surgeon: Will Hurst III, MD;  Location: [facility] CATH LAB/EP;  Service: Cardiology;  Laterality: Left;    renal stones      SHOULDER OPEN ROTATOR CUFF REPAIR       Family History   Problem Relation Age of Onset    Prostate cancer Neg Hx     Kidney disease Neg Hx      Social History     Tobacco Use    Smoking status: Former Smoker     Packs/day: 1.50     Years: 25.00     Pack years: 37.50     Quit date: 1983     Years since quittin.0    Smokeless tobacco: Never Used   Substance Use Topics    Alcohol use: No    Drug use: No     Review of patient's allergies indicates:   Allergen Reactions    Iodine      Other reaction(s): swelling  Other reaction(s): Itching  Other reaction(s): Rash       Medications: I have reviewed the current medication administration record.    (Not in a hospital admission)      Review of Systems   Constitutional: Positive for fatigue. Negative for fever.   HENT: Negative for drooling.    Eyes: Negative for redness.   Respiratory: Negative for cough.    Cardiovascular: Negative for chest pain.   Gastrointestinal: Positive for nausea and vomiting.   Skin:  Negative for rash.   Neurological: Positive for facial asymmetry, speech difficulty, weakness and numbness.   Psychiatric/Behavioral: Positive for confusion.     Objective:     Vital Signs (Most Recent):  Pulse: 95 (01/25/22 1109)  Resp: (!) 22 (01/25/22 1109)  BP: (!) 153/100 (01/25/22 1109)  SpO2: 97 % (01/25/22 1109)    Vital Signs Range (Last 24H):  Pulse:  [95]   Resp:  [22]   BP: (153)/(100)   SpO2:  [97 %]     Physical Exam  Vitals reviewed.   Constitutional:       General: He is not in acute distress.     Appearance: He is well-developed and well-nourished.   HENT:      Head: Normocephalic and atraumatic.   Cardiovascular:      Rate and Rhythm: Normal rate.   Pulmonary:      Effort: Pulmonary effort is normal. Tachypnea present. No respiratory distress.   Skin:     General: Skin is warm and dry.         Neurological Exam:   LOC: obtunded  Attention Span: poor  Language: Global aphasia  Articulation: Untestable due to severe aphasia   Orientation: Untestable due to severe aphasia   Visual Fields: Full  EOM (CN III, IV, VI): Full/intact  Facial Movement (CN VII): Lower facial weakness on the Left  Motor: Arm left  Paresis: 4/5  Leg left  Paresis: 2/5  Arm right  Paresis: 4/5  Leg right Paresis: 4/5  Sensation: Derrek-anesthesia left  Tone: Normal tone throughout      Laboratory:  CMP: No results for input(s): GLUCOSE, CALCIUM, ALBUMIN, PROT, NA, K, CO2, CL, BUN, CREATININE, ALKPHOS, ALT, AST, BILITOT in the last 24 hours.  CBC: No results for input(s): WBC, RBC, HGB, HCT, PLT, MCV, MCH, MCHC in the last 168 hours.  Lipid Panel: No results for input(s): CHOL, LDLCALC, HDL, TRIG in the last 168 hours.  Coagulation: No results for input(s): PT, INR, APTT in the last 168 hours.  Hgb A1C: No results for input(s): HGBA1C in the last 168 hours.  TSH: No results for input(s): TSH in the last 168 hours.    Diagnostic Results:        CT Head. Date: 01/25/22  Examination moderately degraded by patient motion  artifact.     Evolving moderate-sized areas of recent infarction in the right frontal lobe and left cerebellum as above.     No definite new hemorrhage or major vascular distribution infarct.      MRI Brain Ischemic Protocol/MRA neck. Date: 01/25/22  Examination degraded by patient motion artifact.     Moderate-sized subacute right frontal and left inferior cerebellar infarcts with associated petechial hemorrhage.  Overlying sulcal FLAIR hyperintensity likely reflecting some associated localized subarachnoid blood products.     No new hemorrhage or infarct identified elsewhere.     MRA of the cervical and intracranial vasculature appears grossly stable from prior exam allowing for motion artifact.  No new high-grade stenosis or large vessel occlusion.                Shahida Niño PA-C  [facility]  Department of Vascular Neurology   [facility]

## 2022-01-25 NOTE — CONSULTS
[facility]  Critical Care Medicine  Consult Note    Patient Name: Kamar Muñoz  MRN: [id number]  Admission Date: 1/25/2022  Hospital Length of Stay: 0 days  Code Status: Full Code  Attending Physician: Amador Connolly*   Primary Care Provider: Basim Guerrero MD   Principal Problem: <principal problem not specified>    Inpatient consult to Critical Care Medicine  Consult performed by: Monroe Ochoa MD  Consult ordered by: Aly Mclean DO          Pt will be admitted to Critical Care Medicine. Full H&P to follow.     Monroe Ochoa MD  Critical Care Medicine  [facility]

## 2022-01-25 NOTE — SUBJECTIVE & OBJECTIVE
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 2012    Type II or unspecified type diabetes mellitus with neurological manifestations, uncontrolled(250.62) 3/8/2013    Urinary tract infection      Past Surgical History:   Procedure Laterality Date    BACK SURGERY      CATARACT EXTRACTION W/  INTRAOCULAR LENS IMPLANT Right     Per Dr Romero note 2018    COLONOSCOPY N/A 2019    Procedure: COLONOSCOPY Suprep;  Surgeon: Anh Johnson MD;  Location: [facility] ENDO;  Service: Endoscopy;  Laterality: N/A;    EYE SURGERY      HERNIA REPAIR      LEFT HEART CATHETERIZATION Left 2022    Procedure: CATHETERIZATION, HEART, LEFT;  Surgeon: Will Hurst III, MD;  Location: [facility] CATH LAB/EP;  Service: Cardiology;  Laterality: Left;    renal stones      SHOULDER OPEN ROTATOR CUFF REPAIR       Family History   Problem Relation Age of Onset    Prostate cancer Neg Hx     Kidney disease Neg Hx      Social History     Tobacco Use    Smoking status: Former Smoker     Packs/day: 1.50     Years: 25.00     Pack years: 37.50     Quit date: 1983     Years since quittin.0    Smokeless tobacco: Never Used   Substance Use Topics    Alcohol use: No    Drug use: No     Review of patient's allergies indicates:   Allergen Reactions    Iodine      Other reaction(s): swelling  Other reaction(s): Itching  Other reaction(s): Rash       Medications: I have reviewed the current medication administration record.    (Not in a hospital admission)      Review of Systems   Constitutional: Positive for fatigue. Negative for fever.   HENT: Negative for drooling.    Eyes: Negative for redness.   Respiratory: Negative for cough.    Cardiovascular: Negative for chest pain.   Gastrointestinal: Positive for nausea and vomiting.   Skin: Negative for rash.    Neurological: Positive for facial asymmetry, speech difficulty, weakness and numbness.   Psychiatric/Behavioral: Positive for confusion.     Objective:     Vital Signs (Most Recent):  Pulse: 95 (01/25/22 1109)  Resp: (!) 22 (01/25/22 1109)  BP: (!) 153/100 (01/25/22 1109)  SpO2: 97 % (01/25/22 1109)    Vital Signs Range (Last 24H):  Pulse:  [95]   Resp:  [22]   BP: (153)/(100)   SpO2:  [97 %]     Physical Exam  Vitals reviewed.   Constitutional:       General: He is not in acute distress.     Appearance: He is well-developed and well-nourished.   HENT:      Head: Normocephalic and atraumatic.   Cardiovascular:      Rate and Rhythm: Normal rate.   Pulmonary:      Effort: Pulmonary effort is normal. Tachypnea present. No respiratory distress.   Skin:     General: Skin is warm and dry.         Neurological Exam:   LOC: obtunded  Attention Span: poor  Language: Global aphasia  Articulation: Untestable due to severe aphasia   Orientation: Untestable due to severe aphasia   Visual Fields: Full  EOM (CN III, IV, VI): Full/intact  Facial Movement (CN VII): Lower facial weakness on the Left  Motor: Arm left  Paresis: 4/5  Leg left  Paresis: 2/5  Arm right  Paresis: 4/5  Leg right Paresis: 4/5  Sensation: Derrek-anesthesia left  Tone: Normal tone throughout      Laboratory:  CMP: No results for input(s): GLUCOSE, CALCIUM, ALBUMIN, PROT, NA, K, CO2, CL, BUN, CREATININE, ALKPHOS, ALT, AST, BILITOT in the last 24 hours.  CBC: No results for input(s): WBC, RBC, HGB, HCT, PLT, MCV, MCH, MCHC in the last 168 hours.  Lipid Panel: No results for input(s): CHOL, LDLCALC, HDL, TRIG in the last 168 hours.  Coagulation: No results for input(s): PT, INR, APTT in the last 168 hours.  Hgb A1C: No results for input(s): HGBA1C in the last 168 hours.  TSH: No results for input(s): TSH in the last 168 hours.    Diagnostic Results:        CT Head. Date: 01/25/22  Examination moderately degraded by patient motion artifact.     Evolving  moderate-sized areas of recent infarction in the right frontal lobe and left cerebellum as above.     No definite new hemorrhage or major vascular distribution infarct.      MRI Brain Ischemic Protocol/MRA neck. Date: 01/25/22  Examination degraded by patient motion artifact.     Moderate-sized subacute right frontal and left inferior cerebellar infarcts with associated petechial hemorrhage.  Overlying sulcal FLAIR hyperintensity likely reflecting some associated localized subarachnoid blood products.     No new hemorrhage or infarct identified elsewhere.     MRA of the cervical and intracranial vasculature appears grossly stable from prior exam allowing for motion artifact.  No new high-grade stenosis or large vessel occlusion.

## 2022-01-25 NOTE — ASSESSMENT & PLAN NOTE
MRI today shows  Moderate-sized subacute right frontal and left inferior cerebellar infarcts with associated petechial hemorrhage.  Overlying sulcal FLAIR hyperintensity likely reflecting some associated localized subarachnoid blood products.  Holding anticoagulation  Believe this is less likely to be primary  of AMS

## 2022-01-25 NOTE — SUBJECTIVE & OBJECTIVE
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 2012    Type II or unspecified type diabetes mellitus with neurological manifestations, uncontrolled(250.62) 3/8/2013    Urinary tract infection        Past Surgical History:   Procedure Laterality Date    BACK SURGERY      CATARACT EXTRACTION W/  INTRAOCULAR LENS IMPLANT Right     Per Dr Romero note 2018    COLONOSCOPY N/A 2019    Procedure: COLONOSCOPY Suprep;  Surgeon: Anh Johnson MD;  Location: [facility] ENDO;  Service: Endoscopy;  Laterality: N/A;    EYE SURGERY      HERNIA REPAIR      LEFT HEART CATHETERIZATION Left 2022    Procedure: CATHETERIZATION, HEART, LEFT;  Surgeon: Will Hurst III, MD;  Location: [facility] CATH LAB/EP;  Service: Cardiology;  Laterality: Left;    renal stones      SHOULDER OPEN ROTATOR CUFF REPAIR         Review of patient's allergies indicates:   Allergen Reactions    Iodine      Other reaction(s): swelling  Other reaction(s): Itching  Other reaction(s): Rash       Family History    None       Tobacco Use    Smoking status: Former Smoker     Packs/day: 1.50     Years: 25.00     Pack years: 37.50     Quit date: 1983     Years since quittin.0    Smokeless tobacco: Never Used   Substance and Sexual Activity    Alcohol use: No    Drug use: No    Sexual activity: Yes     Partners: Female      Review of Systems   Unable to perform ROS: Mental status change     Objective:     Vital Signs (Most Recent):  Temp: 96.4 °F (35.8 °C) (22 1454)  Pulse: 91 (22 1622)  Resp: (!) 25 (22 162)  BP: (!) 113/54 (22 1622)  SpO2: 100 % (22 1622) Vital Signs (24h Range):  Temp:  [96.4 °F (35.8 °C)] 96.4 °F (35.8 °C)  Pulse:  [] 91  Resp:  [18-25] 25  SpO2:  [97 %-100 %] 100 %  BP: (105-153)/() 113/54   Weight: 95.3  kg (210 lb)  Body mass index is 27.71 kg/m².      Intake/Output Summary (Last 24 hours) at 1/25/2022 1658  Last data filed at 1/25/2022 1340  Gross per 24 hour   Intake 1000 ml   Output --   Net 1000 ml       Physical Exam  Vitals and nursing note reviewed. Exam conducted with a chaperone present.   Constitutional:       Appearance: He is ill-appearing and toxic-appearing.   HENT:      Head:      Comments: Wearing EEG gauze wrap on my exam     Nose: Nose normal.      Mouth/Throat:      Mouth: Mucous membranes are dry.   Eyes:      Extraocular Movements: Extraocular movements intact.      Pupils: Pupils are equal, round, and reactive to light.   Neck:      Comments: Ranges neck appropriately  Cardiovascular:      Rate and Rhythm: Normal rate and regular rhythm.      Pulses: Normal pulses.   Pulmonary:      Comments: Patient is tachypneic and taking deep breaths  Chest:      Chest wall: No tenderness.   Abdominal:      General: Abdomen is flat.      Palpations: Abdomen is soft.      Tenderness: There is no abdominal tenderness. There is no guarding or rebound.   Genitourinary:     Comments: Balanitis without crepitus or other signs of deep infection    Musculoskeletal:      Comments: No gross deformity of extremities   Skin:     General: Skin is warm and dry.      Comments: Some sacral redness without any skin breakdown   Neurological:      Comments: Oriented to self and location, not to date, slurred speech/difficult to understand, moves all extremities, answers basic questions appropriately intermittently, follows basic commands intermittently         Vents:     Lines/Drains/Airways     Drain                 Urethral Catheter 01/25/22 1623 Straight-tip 16 Fr. <1 day          Arterial Line            Arterial Line -- days          Peripheral Intravenous Line                 Peripheral IV - Single Lumen 01/25/22 1551 18 G Left Antecubital <1 day         Peripheral IV - Single Lumen 01/25/22 1601 20 G Right Antecubital  <1 day              Significant Labs:    CBC/Anemia Profile:  Recent Labs   Lab 01/25/22  1213 01/25/22  1216 01/25/22  1225   WBC 15.48*  --   --    HGB 13.6*  --   --    HCT 45.5 45 47     --   --    MCV 95  --   --    RDW 14.7*  --   --         Chemistries:  Recent Labs   Lab 01/25/22  1213 01/25/22  1410    135*   K 6.2* 6.4*   CL 96 97   CO2 5* 5*   BUN 40* 41*   CREATININE 3.2* 3.4*   CALCIUM 8.9 8.7   ALBUMIN 3.0*  --    PROT 7.0  --    BILITOT 0.6  --    ALKPHOS 124  --    ALT 15  --    AST 14  --    MG 2.0  --        All pertinent labs within the past 24 hours have been reviewed.    Significant Imaging: I have reviewed all pertinent imaging results/findings within the past 24 hours.

## 2022-01-25 NOTE — ED TRIAGE NOTES
Slurred Speech (LKW 0800. Slurred speech, left facial droop, and left sided weakness. No drift. Recently dx for a stroke)

## 2022-01-25 NOTE — SUBJECTIVE & OBJECTIVE
Pt will be admitted to Critical Care Medicine. Full H&P to follow.     Monroe Ochoa, HO2  [facility]-EM

## 2022-01-25 NOTE — ED NOTES
Pt care assumed. Pt receiving EEG. IVF started as ordered. Wife and daughter went home. Would like to be called with updates. Pt on bp cuff, continuous pulse ox, cardiac monitor. Will continue to closely monitor.

## 2022-01-25 NOTE — ASSESSMENT & PLAN NOTE
Per chart review patient was seen for subacute infarct at [facility] on 1/14/22   R MCA and L cerebellar infarcts  New onset AF   Treatment plan for triple therapy given stent, delayed start date from discharge 2/2 petechial hemorrhage

## 2022-01-25 NOTE — ASSESSMENT & PLAN NOTE
Profoundly hyperglycemic, acidotic, +beta hydrox  Concern for infarction with positive trop and ecg changes although this may be persistent tropinemia, no obvious sign of infection, urine pending, empirically covering, has had nausea and vomiting, dehydration may have led to decreased home insulin use  -insulin drip, K greater than 6 to start  -q2 BMP  -3L LR  -IVC is more than 50% collapsible on POCUS, likely profoundly hypovolemic

## 2022-01-25 NOTE — ASSESSMENT & PLAN NOTE
Kamar Muñoz is a 76 y.o. male with PMHx of CAD (s/p stent), A Fib (on Eliquis), DM, HLD, HTN, and prior stroke who presented to ED via EMS for AMS, slurred speech, L facial droop, and L arm weakness. Stroke code activated in route to ED. He was last known normal at 8 am. On arrival to hospital, patient somnolent, nonverbal, not following commands. He had LSW and L facial droop. Patient unable to receive tPA due to home eliquis. STAT CT without acute changes. Patient taken to MRI acute intervention due to Iodine allergy. MRI revealed previous infarcts with stable petechial hemorrhage, no LVO. At this time, there is low suspicion for cerebrovascular origin for patient's symptoms. Recommend exploring metabolic or infectious causes for AMS. Consider EEG.

## 2022-01-26 PROBLEM — R56.9 FOCAL SEIZURES: Status: ACTIVE | Noted: 2022-01-26

## 2022-01-26 LAB
ABO + RH BLD: NORMAL
ALBUMIN SERPL BCP-MCNC: 2.4 G/DL (ref 3.5–5.2)
ALBUMIN SERPL BCP-MCNC: 2.7 G/DL (ref 3.5–5.2)
ALP SERPL-CCNC: 107 U/L (ref 55–135)
ALP SERPL-CCNC: 113 U/L (ref 55–135)
ALT SERPL W/O P-5'-P-CCNC: 14 U/L (ref 10–44)
ALT SERPL W/O P-5'-P-CCNC: 18 U/L (ref 10–44)
ANION GAP SERPL CALC-SCNC: 10 MMOL/L (ref 8–16)
ANION GAP SERPL CALC-SCNC: 11 MMOL/L (ref 8–16)
ANION GAP SERPL CALC-SCNC: 12 MMOL/L (ref 8–16)
ANION GAP SERPL CALC-SCNC: 14 MMOL/L (ref 8–16)
ANION GAP SERPL CALC-SCNC: 9 MMOL/L (ref 8–16)
APTT BLDCRRT: 31.9 SEC (ref 21–32)
APTT BLDCRRT: 32.8 SEC (ref 21–32)
APTT BLDCRRT: 57.2 SEC (ref 21–32)
ASCENDING AORTA: 3.26 CM
AST SERPL-CCNC: 20 U/L (ref 10–40)
AST SERPL-CCNC: 41 U/L (ref 10–40)
AV INDEX (PROSTH): 0.53
AV MEAN GRADIENT: 5 MMHG
AV PEAK GRADIENT: 7 MMHG
AV VALVE AREA: 1.95 CM2
AV VELOCITY RATIO: 0.59
B-OH-BUTYR BLD STRIP-SCNC: 0.4 MMOL/L (ref 0–0.5)
BILIRUB SERPL-MCNC: 0.4 MG/DL (ref 0.1–1)
BILIRUB SERPL-MCNC: 0.5 MG/DL (ref 0.1–1)
BLD GP AB SCN CELLS X3 SERPL QL: NORMAL
BSA FOR ECHO PROCEDURE: 2.21 M2
BUN SERPL-MCNC: 32 MG/DL (ref 8–23)
BUN SERPL-MCNC: 36 MG/DL (ref 8–23)
BUN SERPL-MCNC: 36 MG/DL (ref 8–23)
BUN SERPL-MCNC: 38 MG/DL (ref 8–23)
BUN SERPL-MCNC: 40 MG/DL (ref 8–23)
CALCIUM SERPL-MCNC: 7.2 MG/DL (ref 8.7–10.5)
CALCIUM SERPL-MCNC: 7.9 MG/DL (ref 8.7–10.5)
CALCIUM SERPL-MCNC: 8.3 MG/DL (ref 8.7–10.5)
CALCIUM SERPL-MCNC: 8.5 MG/DL (ref 8.7–10.5)
CALCIUM SERPL-MCNC: 8.6 MG/DL (ref 8.7–10.5)
CHLORIDE SERPL-SCNC: 109 MMOL/L (ref 95–110)
CHLORIDE SERPL-SCNC: 109 MMOL/L (ref 95–110)
CHLORIDE SERPL-SCNC: 110 MMOL/L (ref 95–110)
CHLORIDE SERPL-SCNC: 111 MMOL/L (ref 95–110)
CHLORIDE SERPL-SCNC: 112 MMOL/L (ref 95–110)
CO2 SERPL-SCNC: 16 MMOL/L (ref 23–29)
CO2 SERPL-SCNC: 17 MMOL/L (ref 23–29)
CO2 SERPL-SCNC: 20 MMOL/L (ref 23–29)
CO2 SERPL-SCNC: 21 MMOL/L (ref 23–29)
CO2 SERPL-SCNC: 24 MMOL/L (ref 23–29)
CREAT SERPL-MCNC: 2.1 MG/DL (ref 0.5–1.4)
CREAT SERPL-MCNC: 2.2 MG/DL (ref 0.5–1.4)
CREAT SERPL-MCNC: 2.5 MG/DL (ref 0.5–1.4)
CREAT SERPL-MCNC: 2.6 MG/DL (ref 0.5–1.4)
CREAT SERPL-MCNC: 2.8 MG/DL (ref 0.5–1.4)
CV ECHO LV RWT: 0.32 CM
DOP CALC AO PEAK VEL: 1.29 M/S
DOP CALC AO VTI: 27.4 CM
DOP CALC LVOT AREA: 3.7 CM2
DOP CALC LVOT DIAMETER: 2.17 CM
DOP CALC LVOT PEAK VEL: 0.76 M/S
DOP CALC LVOT STROKE VOLUME: 53.41 CM3
DOP CALCLVOT PEAK VEL VTI: 14.45 CM
E WAVE DECELERATION TIME: 205.32 MSEC
E/A RATIO: 0.99
E/E' RATIO: 10.48 M/S
ECHO LV POSTERIOR WALL: 0.91 CM (ref 0.6–1.1)
EJECTION FRACTION: 50 %
EST. GFR  (AFRICAN AMERICAN): 23.9 ML/MIN/1.73 M^2
EST. GFR  (AFRICAN AMERICAN): 26.1 ML/MIN/1.73 M^2
EST. GFR  (AFRICAN AMERICAN): 27.4 ML/MIN/1.73 M^2
EST. GFR  (AFRICAN AMERICAN): 32 ML/MIN/1.73 M^2
EST. GFR  (AFRICAN AMERICAN): 33.8 ML/MIN/1.73 M^2
EST. GFR  (NON AFRICAN AMERICAN): 20.7 ML/MIN/1.73 M^2
EST. GFR  (NON AFRICAN AMERICAN): 22.6 ML/MIN/1.73 M^2
EST. GFR  (NON AFRICAN AMERICAN): 23.7 ML/MIN/1.73 M^2
EST. GFR  (NON AFRICAN AMERICAN): 27.7 ML/MIN/1.73 M^2
EST. GFR  (NON AFRICAN AMERICAN): 29.3 ML/MIN/1.73 M^2
ESTIMATED AVG GLUCOSE: 283 MG/DL (ref 68–131)
FRACTIONAL SHORTENING: 26 % (ref 28–44)
GLUCOSE SERPL-MCNC: 120 MG/DL (ref 70–110)
GLUCOSE SERPL-MCNC: 174 MG/DL (ref 70–110)
GLUCOSE SERPL-MCNC: 237 MG/DL (ref 70–110)
GLUCOSE SERPL-MCNC: 355 MG/DL (ref 70–110)
GLUCOSE SERPL-MCNC: 465 MG/DL (ref 70–110)
HBA1C MFR BLD: 11.5 % (ref 4–5.6)
INTERVENTRICULAR SEPTUM: 1.01 CM (ref 0.6–1.1)
LA MAJOR: 5.2 CM
LA MINOR: 5.6 CM
LA WIDTH: 4.5 CM
LEFT ATRIUM SIZE: 4.3 CM
LEFT ATRIUM VOLUME INDEX: 40.3 ML/M2
LEFT ATRIUM VOLUME: 88.69 CM3
LEFT INTERNAL DIMENSION IN SYSTOLE: 4.25 CM (ref 2.1–4)
LEFT VENTRICLE DIASTOLIC VOLUME INDEX: 73.65 ML/M2
LEFT VENTRICLE DIASTOLIC VOLUME: 162.03 ML
LEFT VENTRICLE MASS INDEX: 98 G/M2
LEFT VENTRICLE SYSTOLIC VOLUME INDEX: 36.7 ML/M2
LEFT VENTRICLE SYSTOLIC VOLUME: 80.67 ML
LEFT VENTRICULAR INTERNAL DIMENSION IN DIASTOLE: 5.73 CM (ref 3.5–6)
LEFT VENTRICULAR MASS: 216.56 G
LV LATERAL E/E' RATIO: 8.46 M/S
LV SEPTAL E/E' RATIO: 13.75 M/S
MAGNESIUM SERPL-MCNC: 2.2 MG/DL (ref 1.6–2.6)
MV PEAK A VEL: 1.11 M/S
MV PEAK E VEL: 1.1 M/S
MV STENOSIS PRESSURE HALF TIME: 59.54 MS
MV VALVE AREA P 1/2 METHOD: 3.69 CM2
POCT GLUCOSE: 115 MG/DL (ref 70–110)
POCT GLUCOSE: 128 MG/DL (ref 70–110)
POCT GLUCOSE: 128 MG/DL (ref 70–110)
POCT GLUCOSE: 156 MG/DL (ref 70–110)
POCT GLUCOSE: 187 MG/DL (ref 70–110)
POCT GLUCOSE: 199 MG/DL (ref 70–110)
POCT GLUCOSE: 232 MG/DL (ref 70–110)
POCT GLUCOSE: 267 MG/DL (ref 70–110)
POCT GLUCOSE: 270 MG/DL (ref 70–110)
POCT GLUCOSE: 378 MG/DL (ref 70–110)
POCT GLUCOSE: 434 MG/DL (ref 70–110)
POTASSIUM SERPL-SCNC: 3.2 MMOL/L (ref 3.5–5.1)
POTASSIUM SERPL-SCNC: 3.8 MMOL/L (ref 3.5–5.1)
POTASSIUM SERPL-SCNC: 4 MMOL/L (ref 3.5–5.1)
POTASSIUM SERPL-SCNC: 4.2 MMOL/L (ref 3.5–5.1)
POTASSIUM SERPL-SCNC: 4.2 MMOL/L (ref 3.5–5.1)
PROT SERPL-MCNC: 5.7 G/DL (ref 6–8.4)
PROT SERPL-MCNC: 6.2 G/DL (ref 6–8.4)
RA MAJOR: 5.47 CM
RA PRESSURE: 15 MMHG
RA WIDTH: 3.82 CM
RIGHT VENTRICULAR END-DIASTOLIC DIMENSION: 3.42 CM
SINUS: 3.26 CM
SODIUM SERPL-SCNC: 137 MMOL/L (ref 136–145)
SODIUM SERPL-SCNC: 139 MMOL/L (ref 136–145)
SODIUM SERPL-SCNC: 141 MMOL/L (ref 136–145)
SODIUM SERPL-SCNC: 143 MMOL/L (ref 136–145)
SODIUM SERPL-SCNC: 145 MMOL/L (ref 136–145)
STJ: 3.17 CM
TDI LATERAL: 0.13 M/S
TDI SEPTAL: 0.08 M/S
TDI: 0.11 M/S
TRICUSPID ANNULAR PLANE SYSTOLIC EXCURSION: 2.2 CM
TROPONIN I SERPL DL<=0.01 NG/ML-MCNC: 0.82 NG/ML (ref 0–0.03)
TROPONIN I SERPL DL<=0.01 NG/ML-MCNC: 1.04 NG/ML (ref 0–0.03)
TROPONIN I SERPL DL<=0.01 NG/ML-MCNC: 1.3 NG/ML (ref 0–0.03)
TROPONIN I SERPL DL<=0.01 NG/ML-MCNC: 1.49 NG/ML (ref 0–0.03)
TROPONIN I SERPL DL<=0.01 NG/ML-MCNC: 1.59 NG/ML (ref 0–0.03)

## 2022-01-26 PROCEDURE — 25000003 PHARM REV CODE 250: Performed by: STUDENT IN AN ORGANIZED HEALTH CARE EDUCATION/TRAINING PROGRAM

## 2022-01-26 PROCEDURE — 93010 EKG 12-LEAD: ICD-10-PCS | Mod: ,,, | Performed by: INTERNAL MEDICINE

## 2022-01-26 PROCEDURE — 95720 PR EEG, W/VIDEO, CONT RECORD, I&R, >12<26 HRS: ICD-10-PCS | Mod: ,,, | Performed by: PSYCHIATRY & NEUROLOGY

## 2022-01-26 PROCEDURE — 63600175 PHARM REV CODE 636 W HCPCS: Performed by: STUDENT IN AN ORGANIZED HEALTH CARE EDUCATION/TRAINING PROGRAM

## 2022-01-26 PROCEDURE — C9254 INJECTION, LACOSAMIDE: HCPCS | Performed by: PSYCHIATRY & NEUROLOGY

## 2022-01-26 PROCEDURE — 93005 ELECTROCARDIOGRAM TRACING: CPT

## 2022-01-26 PROCEDURE — 93010 ELECTROCARDIOGRAM REPORT: CPT | Mod: ,,, | Performed by: INTERNAL MEDICINE

## 2022-01-26 PROCEDURE — C9254 INJECTION, LACOSAMIDE: HCPCS

## 2022-01-26 PROCEDURE — 93010 EKG 12-LEAD: ICD-10-PCS | Mod: 76,,, | Performed by: INTERNAL MEDICINE

## 2022-01-26 PROCEDURE — 99291 PR CRITICAL CARE, E/M 30-74 MINUTES: ICD-10-PCS | Mod: GC,,, | Performed by: PSYCHIATRY & NEUROLOGY

## 2022-01-26 PROCEDURE — C9113 INJ PANTOPRAZOLE SODIUM, VIA: HCPCS

## 2022-01-26 PROCEDURE — 25000003 PHARM REV CODE 250: Performed by: PSYCHIATRY & NEUROLOGY

## 2022-01-26 PROCEDURE — 84484 ASSAY OF TROPONIN QUANT: CPT | Mod: 91 | Performed by: STUDENT IN AN ORGANIZED HEALTH CARE EDUCATION/TRAINING PROGRAM

## 2022-01-26 PROCEDURE — 92610 EVALUATE SWALLOWING FUNCTION: CPT

## 2022-01-26 PROCEDURE — 25500020 PHARM REV CODE 255: Performed by: PSYCHIATRY & NEUROLOGY

## 2022-01-26 PROCEDURE — 83036 HEMOGLOBIN GLYCOSYLATED A1C: CPT

## 2022-01-26 PROCEDURE — 85730 THROMBOPLASTIN TIME PARTIAL: CPT | Mod: 91 | Performed by: PSYCHIATRY & NEUROLOGY

## 2022-01-26 PROCEDURE — 95714 VEEG EA 12-26 HR UNMNTR: CPT

## 2022-01-26 PROCEDURE — 84484 ASSAY OF TROPONIN QUANT: CPT | Mod: 91

## 2022-01-26 PROCEDURE — 93010 ELECTROCARDIOGRAM REPORT: CPT | Mod: 76,,, | Performed by: INTERNAL MEDICINE

## 2022-01-26 PROCEDURE — 95720 EEG PHY/QHP EA INCR W/VEEG: CPT | Mod: ,,, | Performed by: PSYCHIATRY & NEUROLOGY

## 2022-01-26 PROCEDURE — 63600175 PHARM REV CODE 636 W HCPCS: Performed by: PSYCHIATRY & NEUROLOGY

## 2022-01-26 PROCEDURE — 80053 COMPREHEN METABOLIC PANEL: CPT | Performed by: STUDENT IN AN ORGANIZED HEALTH CARE EDUCATION/TRAINING PROGRAM

## 2022-01-26 PROCEDURE — 20000000 HC ICU ROOM

## 2022-01-26 PROCEDURE — 85730 THROMBOPLASTIN TIME PARTIAL: CPT | Performed by: INTERNAL MEDICINE

## 2022-01-26 PROCEDURE — 80048 BASIC METABOLIC PNL TOTAL CA: CPT | Performed by: STUDENT IN AN ORGANIZED HEALTH CARE EDUCATION/TRAINING PROGRAM

## 2022-01-26 PROCEDURE — 82010 KETONE BODYS QUAN: CPT | Performed by: PSYCHIATRY & NEUROLOGY

## 2022-01-26 PROCEDURE — 80053 COMPREHEN METABOLIC PANEL: CPT | Mod: 91

## 2022-01-26 PROCEDURE — 99291 CRITICAL CARE FIRST HOUR: CPT | Mod: GC,,, | Performed by: PSYCHIATRY & NEUROLOGY

## 2022-01-26 PROCEDURE — 94761 N-INVAS EAR/PLS OXIMETRY MLT: CPT

## 2022-01-26 PROCEDURE — 63600175 PHARM REV CODE 636 W HCPCS

## 2022-01-26 PROCEDURE — 83735 ASSAY OF MAGNESIUM: CPT | Performed by: STUDENT IN AN ORGANIZED HEALTH CARE EDUCATION/TRAINING PROGRAM

## 2022-01-26 PROCEDURE — 25000003 PHARM REV CODE 250

## 2022-01-26 RX ORDER — PANTOPRAZOLE SODIUM 40 MG/10ML
40 INJECTION, POWDER, LYOPHILIZED, FOR SOLUTION INTRAVENOUS DAILY
Status: DISCONTINUED | OUTPATIENT
Start: 2022-01-26 | End: 2022-01-27

## 2022-01-26 RX ORDER — DEXTROSE MONOHYDRATE AND SODIUM CHLORIDE 5; .9 G/100ML; G/100ML
INJECTION, SOLUTION INTRAVENOUS CONTINUOUS
Status: DISCONTINUED | OUTPATIENT
Start: 2022-01-26 | End: 2022-01-27

## 2022-01-26 RX ORDER — LOSARTAN POTASSIUM 25 MG/1
25 TABLET ORAL DAILY
Status: DISCONTINUED | OUTPATIENT
Start: 2022-01-26 | End: 2022-01-28

## 2022-01-26 RX ORDER — INSULIN ASPART 100 [IU]/ML
6 INJECTION, SOLUTION INTRAVENOUS; SUBCUTANEOUS
Status: DISCONTINUED | OUTPATIENT
Start: 2022-01-26 | End: 2022-01-26

## 2022-01-26 RX ORDER — POTASSIUM CHLORIDE 7.45 MG/ML
10 INJECTION INTRAVENOUS
Status: DISCONTINUED | OUTPATIENT
Start: 2022-01-26 | End: 2022-01-26

## 2022-01-26 RX ORDER — HYDRALAZINE HYDROCHLORIDE 20 MG/ML
10 INJECTION INTRAMUSCULAR; INTRAVENOUS EVERY 6 HOURS PRN
Status: DISCONTINUED | OUTPATIENT
Start: 2022-01-26 | End: 2022-02-02 | Stop reason: HOSPADM

## 2022-01-26 RX ORDER — INSULIN ASPART 100 [IU]/ML
6 INJECTION, SOLUTION INTRAVENOUS; SUBCUTANEOUS EVERY 4 HOURS
Status: DISCONTINUED | OUTPATIENT
Start: 2022-01-26 | End: 2022-01-26

## 2022-01-26 RX ORDER — SODIUM CHLORIDE, SODIUM LACTATE, POTASSIUM CHLORIDE, CALCIUM CHLORIDE 600; 310; 30; 20 MG/100ML; MG/100ML; MG/100ML; MG/100ML
INJECTION, SOLUTION INTRAVENOUS CONTINUOUS
Status: DISCONTINUED | OUTPATIENT
Start: 2022-01-26 | End: 2022-01-26

## 2022-01-26 RX ORDER — AMIODARONE HYDROCHLORIDE 200 MG/1
200 TABLET ORAL DAILY
Status: DISCONTINUED | OUTPATIENT
Start: 2022-01-26 | End: 2022-01-28

## 2022-01-26 RX ORDER — LABETALOL HCL 20 MG/4 ML
10 SYRINGE (ML) INTRAVENOUS EVERY 6 HOURS PRN
Status: DISCONTINUED | OUTPATIENT
Start: 2022-01-26 | End: 2022-02-02 | Stop reason: HOSPADM

## 2022-01-26 RX ORDER — METOPROLOL TARTRATE 25 MG/1
12.5 TABLET ORAL 2 TIMES DAILY
Status: DISCONTINUED | OUTPATIENT
Start: 2022-01-26 | End: 2022-01-28

## 2022-01-26 RX ORDER — INSULIN ASPART 100 [IU]/ML
6 INJECTION, SOLUTION INTRAVENOUS; SUBCUTANEOUS EVERY 4 HOURS
Status: DISCONTINUED | OUTPATIENT
Start: 2022-01-26 | End: 2022-01-27

## 2022-01-26 RX ORDER — POTASSIUM CHLORIDE 7.45 MG/ML
10 INJECTION INTRAVENOUS
Status: DISPENSED | OUTPATIENT
Start: 2022-01-26 | End: 2022-01-26

## 2022-01-26 RX ORDER — INSULIN ASPART 100 [IU]/ML
1-10 INJECTION, SOLUTION INTRAVENOUS; SUBCUTANEOUS EVERY 4 HOURS PRN
Status: DISCONTINUED | OUTPATIENT
Start: 2022-01-26 | End: 2022-01-27

## 2022-01-26 RX ORDER — DEXTROSE MONOHYDRATE AND SODIUM CHLORIDE 5; .9 G/100ML; G/100ML
INJECTION, SOLUTION INTRAVENOUS CONTINUOUS
Status: DISCONTINUED | OUTPATIENT
Start: 2022-01-26 | End: 2022-01-26

## 2022-01-26 RX ORDER — DILTIAZEM HYDROCHLORIDE 30 MG/1
30 TABLET, FILM COATED ORAL EVERY 6 HOURS
Status: DISCONTINUED | OUTPATIENT
Start: 2022-01-26 | End: 2022-01-28

## 2022-01-26 RX ORDER — FUROSEMIDE 10 MG/ML
40 INJECTION INTRAMUSCULAR; INTRAVENOUS ONCE
Status: COMPLETED | OUTPATIENT
Start: 2022-01-26 | End: 2022-01-26

## 2022-01-26 RX ADMIN — DEXTROSE AND SODIUM CHLORIDE: 5; .9 INJECTION, SOLUTION INTRAVENOUS at 12:01

## 2022-01-26 RX ADMIN — POTASSIUM CHLORIDE 10 MEQ: 7.46 INJECTION, SOLUTION INTRAVENOUS at 01:01

## 2022-01-26 RX ADMIN — SODIUM CHLORIDE 400 MG: 9 INJECTION, SOLUTION INTRAVENOUS at 01:01

## 2022-01-26 RX ADMIN — CLOPIDOGREL 75 MG: 75 TABLET, FILM COATED ORAL at 10:01

## 2022-01-26 RX ADMIN — ASPIRIN 81 MG CHEWABLE TABLET 81 MG: 81 TABLET CHEWABLE at 10:01

## 2022-01-26 RX ADMIN — POTASSIUM CHLORIDE 10 MEQ: 7.46 INJECTION, SOLUTION INTRAVENOUS at 03:01

## 2022-01-26 RX ADMIN — LOSARTAN POTASSIUM 25 MG: 25 TABLET, FILM COATED ORAL at 10:01

## 2022-01-26 RX ADMIN — ATORVASTATIN CALCIUM 80 MG: 40 TABLET, FILM COATED ORAL at 10:01

## 2022-01-26 RX ADMIN — METOPROLOL TARTRATE 12.5 MG: 25 TABLET, FILM COATED ORAL at 10:01

## 2022-01-26 RX ADMIN — AMIODARONE HYDROCHLORIDE 200 MG: 200 TABLET ORAL at 10:01

## 2022-01-26 RX ADMIN — HEPARIN SODIUM 12 UNITS/KG/HR: 1000 INJECTION INTRAVENOUS; SUBCUTANEOUS at 12:01

## 2022-01-26 RX ADMIN — HYDRALAZINE HYDROCHLORIDE 10 MG: 20 INJECTION INTRAMUSCULAR; INTRAVENOUS at 05:01

## 2022-01-26 RX ADMIN — SODIUM CHLORIDE 100 MG: 9 INJECTION, SOLUTION INTRAVENOUS at 09:01

## 2022-01-26 RX ADMIN — DILTIAZEM HYDROCHLORIDE 30 MG: 30 TABLET, FILM COATED ORAL at 10:01

## 2022-01-26 RX ADMIN — MUPIROCIN: 20 OINTMENT TOPICAL at 09:01

## 2022-01-26 RX ADMIN — INSULIN ASPART 6 UNITS: 100 INJECTION, SOLUTION INTRAVENOUS; SUBCUTANEOUS at 05:01

## 2022-01-26 RX ADMIN — PIPERACILLIN SODIUM AND TAZOBACTAM SODIUM 4.5 G: 4; .5 INJECTION, POWDER, FOR SOLUTION INTRAVENOUS at 12:01

## 2022-01-26 RX ADMIN — SODIUM CHLORIDE, SODIUM LACTATE, POTASSIUM CHLORIDE, AND CALCIUM CHLORIDE: .6; .31; .03; .02 INJECTION, SOLUTION INTRAVENOUS at 10:01

## 2022-01-26 RX ADMIN — POTASSIUM CHLORIDE 10 MEQ: 7.46 INJECTION, SOLUTION INTRAVENOUS at 02:01

## 2022-01-26 RX ADMIN — PANTOPRAZOLE SODIUM 40 MG: 40 INJECTION, POWDER, FOR SOLUTION INTRAVENOUS at 10:01

## 2022-01-26 RX ADMIN — INSULIN ASPART 3 UNITS: 100 INJECTION, SOLUTION INTRAVENOUS; SUBCUTANEOUS at 10:01

## 2022-01-26 RX ADMIN — DILTIAZEM HYDROCHLORIDE 30 MG: 30 TABLET, FILM COATED ORAL at 11:01

## 2022-01-26 RX ADMIN — DILTIAZEM HYDROCHLORIDE 30 MG: 30 TABLET, FILM COATED ORAL at 05:01

## 2022-01-26 RX ADMIN — HUMAN ALBUMIN MICROSPHERES AND PERFLUTREN 0.66 MG: 10; .22 INJECTION, SOLUTION INTRAVENOUS at 11:01

## 2022-01-26 RX ADMIN — FUROSEMIDE 40 MG: 10 INJECTION INTRAMUSCULAR; INTRAVENOUS at 03:01

## 2022-01-26 RX ADMIN — POTASSIUM CHLORIDE 10 MEQ: 7.46 INJECTION, SOLUTION INTRAVENOUS at 04:01

## 2022-01-26 RX ADMIN — INSULIN ASPART 6 UNITS: 100 INJECTION, SOLUTION INTRAVENOUS; SUBCUTANEOUS at 10:01

## 2022-01-26 RX ADMIN — METOPROLOL TARTRATE 12.5 MG: 25 TABLET, FILM COATED ORAL at 09:01

## 2022-01-26 RX ADMIN — LABETALOL HYDROCHLORIDE 10 MG: 5 INJECTION, SOLUTION INTRAVENOUS at 10:01

## 2022-01-26 RX ADMIN — PIPERACILLIN SODIUM AND TAZOBACTAM SODIUM 4.5 G: 4; .5 INJECTION, POWDER, FOR SOLUTION INTRAVENOUS at 09:01

## 2022-01-26 RX ADMIN — DEXTROSE AND SODIUM CHLORIDE: 5; .9 INJECTION, SOLUTION INTRAVENOUS at 11:01

## 2022-01-26 NOTE — ASSESSMENT & PLAN NOTE
· [facility] on 1/9/22 with two vessel coronary artery disease with 100% occlusion of mid RCA and 70% stenosis of proximal LAD., RCA stent x2  - trop 0.143 today, no active chest pain, ecg with new LBBB and TWI in inferior leads, repeat trop/ecg pending  - trop was 12 twelve days ago  - uptrending trop overnight after episode of active chest pain, ACS protocol started after consult with vascular neurology, interventional cards consulted, will wait till Cr normalizes for possible intervention

## 2022-01-26 NOTE — SUBJECTIVE & OBJECTIVE
Interval History/Significant Events:  Had active chest pain last night with rising troponin, no other acute events, some mild belly pain this AM, no chest pain or SOB    Review of Systems   Constitutional: Negative for chills and fever.   HENT: Negative for congestion and drooling.    Respiratory: Negative for cough and shortness of breath.    Cardiovascular: Negative for chest pain.   Gastrointestinal: Positive for abdominal pain. Negative for nausea and vomiting.     Objective:     Vital Signs (Most Recent):  Temp: 98 °F (36.7 °C) (01/26/22 1100)  Pulse: 77 (01/26/22 1200)  Resp: 17 (01/26/22 1200)  BP: (!) 180/85 (01/26/22 1200)  SpO2: 99 % (01/26/22 1200) Vital Signs (24h Range):  Temp:  [96.4 °F (35.8 °C)-98.4 °F (36.9 °C)] 98 °F (36.7 °C)  Pulse:  [76-91] 77  Resp:  [13-30] 17  SpO2:  [92 %-100 %] 99 %  BP: (108-180)/() 180/85   Weight: 95.3 kg (210 lb)  Body mass index is 27.71 kg/m².      Intake/Output Summary (Last 24 hours) at 1/26/2022 1311  Last data filed at 1/26/2022 1200  Gross per 24 hour   Intake 4379.23 ml   Output 750 ml   Net 3629.23 ml       Physical Exam  Vitals and nursing note reviewed.   Constitutional:       Comments: Awake, alert elderly man with eeg wrap on his head   HENT:      Nose: No rhinorrhea.      Mouth/Throat:      Mouth: Mucous membranes are dry.   Eyes:      General: No scleral icterus.  Cardiovascular:      Rate and Rhythm: Normal rate.      Pulses: Normal pulses.   Pulmonary:      Effort: Pulmonary effort is normal. No respiratory distress.      Comments: NWOB, speaking in full sentences, no accessory muscle use  Abdominal:      General: Abdomen is flat.      Palpations: Abdomen is soft.      Tenderness: There is no abdominal tenderness. There is no guarding or rebound.   Musculoskeletal:      Cervical back: Normal range of motion.      Comments: No gross extremity deformity   Skin:     General: Skin is warm and dry.   Neurological:      Mental Status: He is oriented to  person, place, and time.      Comments: Moves all extremities         Vents:     Lines/Drains/Airways     Drain                 Urethral Catheter 01/25/22 1623 Straight-tip 16 Fr. <1 day          Peripheral Intravenous Line                 Peripheral IV - Single Lumen 01/25/22 1601 20 G Right Antecubital <1 day         Peripheral IV - Single Lumen 01/26/22 0148 22 G Left Hand <1 day              Significant Labs:    CBC/Anemia Profile:  Recent Labs   Lab 01/25/22  1213 01/25/22  1216 01/25/22  1918 01/25/22 2028 01/25/22  2137   WBC 15.48*  --   --   --   --    HGB 13.6*  --   --   --   --    HCT 45.5   < > 39 39 38     --   --   --   --    MCV 95  --   --   --   --    RDW 14.7*  --   --   --   --     < > = values in this interval not displayed.        Chemistries:  Recent Labs   Lab 01/25/22  1213 01/25/22  1410 01/25/22  2340 01/26/22  0228 01/26/22  0329      < > 137 143 141   K 6.2*   < > 3.2* 3.8 4.0   CL 96   < > 109 111* 110   CO2 5*   < > 17* 20* 21*   BUN 40*   < > 32* 36* 36*   CREATININE 3.2*   < > 2.2* 2.6* 2.5*   CALCIUM 8.9   < > 7.2* 8.3* 8.5*   ALBUMIN 3.0*  --   --   --  2.7*   PROT 7.0  --   --   --  6.2   BILITOT 0.6  --   --   --  0.4   ALKPHOS 124  --   --   --  113   ALT 15  --   --   --  14   AST 14  --   --   --  20   MG 2.0  --   --   --  2.2    < > = values in this interval not displayed.       All pertinent labs within the past 24 hours have been reviewed.    Significant Imaging:  I have reviewed all pertinent imaging results/findings within the past 24 hours.

## 2022-01-26 NOTE — PROGRESS NOTES
[facility]  Critical Care Medicine  Progress Note    Patient Name: Kamar Muñoz  MRN: [id number]  Admission Date: 1/25/2022  Hospital Length of Stay: 1 days  Code Status: Full Code  Attending Provider: Thom Carson MD  Primary Care Provider: Basim Guerrero MD   Principal Problem: Encephalopathy, metabolic    Subjective:     HPI:  Per chart review, 76-year-old male with a history of CAD, type 2 diabetes, hyperlipidemia, hypertension, recent stroke without residual deficits who presenting with slurred speech, left facial droop and left-sided weakness via EMS.  They were called for change in mental status and vomiting. On scene he had slurred speech, left-sided weakness and left facial droop.  FSBG greater than 400.  His last well known was approximately 8:00 a.m..  On arrival by EMS, he was able to converse however since his arrival to the ED, patient unable to provide any history, unable to converse, has left-sided facial droop and generalized weakness with left worse than right.        Hospital/ICU Course:  1/26 DKA/HHS, AMS, NSTEMI, insulin drip off overnight, improving mental status, ok to start DAPT/heparin for NSTEMI per vascular neurology in setting of brain bleed      Interval History/Significant Events:  Had active chest pain last night with rising troponin, no other acute events, some mild belly pain this AM, no chest pain or SOB    Review of Systems   Constitutional: Negative for chills and fever.   HENT: Negative for congestion and drooling.    Respiratory: Negative for cough and shortness of breath.    Cardiovascular: Negative for chest pain.   Gastrointestinal: Positive for abdominal pain. Negative for nausea and vomiting.     Objective:     Vital Signs (Most Recent):  Temp: 98 °F (36.7 °C) (01/26/22 1100)  Pulse: 77 (01/26/22 1200)  Resp: 17 (01/26/22 1200)  BP: (!) 180/85 (01/26/22 1200)  SpO2: 99 % (01/26/22 1200) Vital Signs (24h Range):  Temp:  [96.4 °F (35.8  °C)-98.4 °F (36.9 °C)] 98 °F (36.7 °C)  Pulse:  [76-91] 77  Resp:  [13-30] 17  SpO2:  [92 %-100 %] 99 %  BP: (108-180)/() 180/85   Weight: 95.3 kg (210 lb)  Body mass index is 27.71 kg/m².      Intake/Output Summary (Last 24 hours) at 1/26/2022 1311  Last data filed at 1/26/2022 1200  Gross per 24 hour   Intake 4379.23 ml   Output 750 ml   Net 3629.23 ml       Physical Exam  Vitals and nursing note reviewed.   Constitutional:       Comments: Awake, alert elderly man with eeg wrap on his head   HENT:      Nose: No rhinorrhea.      Mouth/Throat:      Mouth: Mucous membranes are dry.   Eyes:      General: No scleral icterus.  Cardiovascular:      Rate and Rhythm: Normal rate.      Pulses: Normal pulses.   Pulmonary:      Effort: Pulmonary effort is normal. No respiratory distress.      Comments: NWOB, speaking in full sentences, no accessory muscle use  Abdominal:      General: Abdomen is flat.      Palpations: Abdomen is soft.      Tenderness: There is no abdominal tenderness. There is no guarding or rebound.   Musculoskeletal:      Cervical back: Normal range of motion.      Comments: No gross extremity deformity   Skin:     General: Skin is warm and dry.   Neurological:      Mental Status: He is oriented to person, place, and time.      Comments: Moves all extremities         Vents:     Lines/Drains/Airways     Drain                 Urethral Catheter 01/25/22 1623 Straight-tip 16 Fr. <1 day          Peripheral Intravenous Line                 Peripheral IV - Single Lumen 01/25/22 1601 20 G Right Antecubital <1 day         Peripheral IV - Single Lumen 01/26/22 0148 22 G Left Hand <1 day              Significant Labs:    CBC/Anemia Profile:  Recent Labs   Lab 01/25/22  1213 01/25/22  1216 01/25/22  1918 01/25/22 2028 01/25/22  2137   WBC 15.48*  --   --   --   --    HGB 13.6*  --   --   --   --    HCT 45.5   < > 39 39 38     --   --   --   --    MCV 95  --   --   --   --    RDW 14.7*  --   --   --   --      < > = values in this interval not displayed.        Chemistries:  Recent Labs   Lab 01/25/22  1213 01/25/22  1410 01/25/22  2340 01/26/22  0228 01/26/22  0329      < > 137 143 141   K 6.2*   < > 3.2* 3.8 4.0   CL 96   < > 109 111* 110   CO2 5*   < > 17* 20* 21*   BUN 40*   < > 32* 36* 36*   CREATININE 3.2*   < > 2.2* 2.6* 2.5*   CALCIUM 8.9   < > 7.2* 8.3* 8.5*   ALBUMIN 3.0*  --   --   --  2.7*   PROT 7.0  --   --   --  6.2   BILITOT 0.6  --   --   --  0.4   ALKPHOS 124  --   --   --  113   ALT 15  --   --   --  14   AST 14  --   --   --  20   MG 2.0  --   --   --  2.2    < > = values in this interval not displayed.       All pertinent labs within the past 24 hours have been reviewed.    Significant Imaging:  I have reviewed all pertinent imaging results/findings within the past 24 hours.      ABG  Recent Labs   Lab 01/25/22  1752   PH 7.216*   PO2 54   PCO2 15.6*   HCO3 6.3*   BE -21     Assessment/Plan:     Neuro  * Encephalopathy, metabolic  - likely related to DKA, critical illness may be causing re expression of stroke  - EEG in progress for further evaluation  - protecting airway  - improving this AM    Focal seizures  Noted on EEG today, started on vimpat    Altered mental status  Likely metabolic  -MRI/MRA brain negative for acute stroke    History of ischemic stroke in prior three months  MRI today shows  Moderate-sized subacute right frontal and left inferior cerebellar infarcts with associated petechial hemorrhage.  Overlying sulcal FLAIR hyperintensity likely reflecting some associated localized subarachnoid blood products.  Holding anticoagulation  Believe this is less likely to be primary  of AMS    Facial droop  Present, noted on prior    Dysarthria and anarthria  -noted prior, has slurred speech on exam  -improving    Cardiac/Vascular  NSTEMI (non-ST elevated myocardial infarction)  See STEMI involving RCA    Coronary artery disease involving native heart without angina  pectoris  Holding home ASA/statin initially, restarted DAPT    Paroxysmal atrial fibrillation  - holding home metoprolol xl 25mg, can likely restart pending swallow study    STEMI involving right coronary artery  · [facility] on 1/9/22 with two vessel coronary artery disease with 100% occlusion of mid RCA and 70% stenosis of proximal LAD., RCA stent x2  - trop 0.143 today, no active chest pain, ecg with new LBBB and TWI in inferior leads, repeat trop/ecg pending  - trop was 12 twelve days ago  - uptrending trop overnight after episode of active chest pain, ACS protocol started after consult with vascular neurology, interventional cards consulted, will wait till Cr normalizes for possible intervention    Hypertension associated with diabetes  Holding home meds this AM, can likely restart pending swallow study  -cardizem 120mg qd  -coozar 25mg qd    Renal/  Hyperkalemia  K of 6.2, did not shift in the setting of insulin drip and DKA  - CaGluc ordered  - q2 BMP  - improving this AM    BRENDAN (acute kidney injury)  Baseline Cr 1  - 3.2 on arrival  - ctm  - lopez placed  - 2.2 this AM, improving with hydration    Endocrine  DKA (diabetic ketoacidosis)  Profoundly hyperglycemic, acidotic, +beta hydrox  Concern for infarction with positive trop and ecg changes although this may be persistent tropinemia, no obvious sign of infection, urine pending, empirically covering, has had nausea and vomiting, dehydration may have led to decreased home insulin use  -insulin drip, K greater than 6 to start  -q2 BMP  -3L LR  -IVC is more than 50% collapsible on POCUS, likely profoundly hypovolemic    1/26  -off insulin drip over night, bicarb improving, gap closed, transitioned to long acting    Type 2 diabetes mellitus with diabetic peripheral angiopathy without gangrene, with long-term current use of insulin  See DKA     Critical Care Daily Checklist:    A: Awake: RASS Goal/Actual Goal:    Actual: Long Agitation Sedation Scale (RASS):  Alert and calm   B: Spontaneous Breathing Trial Performed?     C: SAT & SBT Coordinated?  NA                      D: Delirium: CAM-ICU Overall CAM-ICU: Negative   E: Early Mobility Performed? No   F: Feeding Goal:    Status:     Current Diet Order   Procedures    Diet NPO      AS: Analgesia/Sedation None   T: Thromboembolic Prophylaxis heparin   H: HOB > 300 Yes   U: Stress Ulcer Prophylaxis (if needed) pepcid   G: Glucose Control Long acting   B: Bowel Function     I: Indwelling Catheter (Lines & Lopez) Necessity lopez   D: De-escalation of Antimicrobials/Pharmacotherapies Broad spectrum coverage    Plan for the day/ETD Care taken over by neuro critical care    Code Status:  Family/Goals of Care: Full Code         Critical secondary to Patient has a condition that poses threat to life and bodily function: DKA, seizures, nstemi     Critical care was time spent personally by me on the following activities: development of treatment plan with patient or surrogate and bedside caregivers, discussions with consultants, evaluation of patient's response to treatment, examination of patient, ordering and performing treatments and interventions, ordering and review of laboratory studies, ordering and review of radiographic studies, pulse oximetry, re-evaluation of patient's condition. This critical care time did not overlap with that of any other provider or involve time for any procedures.     Monroe Ochoa MD  Critical Care Medicine  [facility]

## 2022-01-26 NOTE — SUBJECTIVE & OBJECTIVE
Interval History: Mr Muñoz was stable overnight with ACS protocol intiated. Pt appeared more responsive this morning, complaining of 8/10 epigastric and RUQ pain with rebound tenderness present. Troponin now decreasing. TTE pending.    Review of Systems   Constitutional: Positive for decreased appetite. Negative for diaphoresis, fever and malaise/fatigue.   HENT: Negative for congestion and sore throat.    Eyes: Negative for blurred vision, vision loss in left eye and vision loss in right eye.   Cardiovascular: Positive for chest pain and dyspnea on exertion. Negative for irregular heartbeat, leg swelling, near-syncope, palpitations and syncope.   Respiratory: Negative for cough, shortness of breath and wheezing.    Endocrine: Negative.    Hematologic/Lymphatic: Negative.    Skin: Negative.    Musculoskeletal: Negative for arthritis, back pain, joint pain and myalgias.   Gastrointestinal: Positive for abdominal pain. Negative for change in bowel habit, bowel incontinence, constipation, diarrhea, hematemesis, hematochezia and melena.   Genitourinary: Negative.    Neurological: Positive for disturbances in coordination and focal weakness. Negative for light-headedness and loss of balance.   Psychiatric/Behavioral: Negative.      Objective:     Vital Signs (Most Recent):  Temp: 97.7 °F (36.5 °C) (01/26/22 0700)  Pulse: 85 (01/26/22 0911)  Resp: (!) 21 (01/26/22 0911)  BP: (!) 176/77 (01/26/22 0800)  SpO2: 99 % (01/26/22 0911) Vital Signs (24h Range):  Temp:  [96.4 °F (35.8 °C)-98.4 °F (36.9 °C)] 97.7 °F (36.5 °C)  Pulse:  [] 85  Resp:  [13-30] 21  SpO2:  [92 %-100 %] 99 %  BP: (105-176)/() 176/77     Weight: 95.3 kg (210 lb)  Body mass index is 27.71 kg/m².     SpO2: 99 %  O2 Device (Oxygen Therapy): room air      Intake/Output Summary (Last 24 hours) at 1/26/2022 0915  Last data filed at 1/26/2022 0700  Gross per 24 hour   Intake 4314.64 ml   Output 750 ml   Net 3564.64 ml       Lines/Drains/Airways      Drain                 NG/OG Tube 01/25/22 2315 14 Fr. Left nostril <1 day         NG/OG Tube 01/25/22 2315 Wayne sump 14 Fr. Left nostril <1 day         Urethral Catheter 01/25/22 1623 Straight-tip 16 Fr. <1 day          Peripheral Intravenous Line                 Peripheral IV - Single Lumen 01/25/22 1551 18 G Left Antecubital <1 day         Peripheral IV - Single Lumen 01/25/22 1601 20 G Right Antecubital <1 day         Peripheral IV - Single Lumen 01/26/22 0148 22 G Left Hand <1 day                Physical Exam  Vitals and nursing note reviewed.   Constitutional:       Appearance: He is not toxic-appearing.   HENT:      Head: Normocephalic and atraumatic.   Eyes:      General: No scleral icterus.     Extraocular Movements: Extraocular movements intact.   Neck:      Vascular: No carotid bruit.   Cardiovascular:      Rate and Rhythm: Normal rate and regular rhythm.      Pulses: Normal pulses.      Heart sounds: Normal heart sounds. No murmur heard.  No gallop.    Pulmonary:      Effort: Pulmonary effort is normal. No respiratory distress.      Breath sounds: Normal breath sounds. No wheezing or rales.   Abdominal:      General: Abdomen is flat. Bowel sounds are normal. There is no distension.      Palpations: Abdomen is soft. There is no mass.      Tenderness: There is abdominal tenderness. There is rebound.   Musculoskeletal:      Cervical back: Normal range of motion.      Right lower leg: No edema.      Left lower leg: No edema.   Skin:     General: Skin is warm and dry.      Capillary Refill: Capillary refill takes less than 2 seconds.      Findings: No rash.   Neurological:      Mental Status: He is alert.      Comments: Dysarthric, L facial droop, bilateral ptosis         Significant Labs:   CMP   Recent Labs   Lab 01/25/22  1213 01/25/22  1410 01/25/22  2340 01/26/22  0228 01/26/22  0329      < > 137 143 141   K 6.2*   < > 3.2* 3.8 4.0   CL 96   < > 109 111* 110   CO2 5*   < > 17* 20* 21*   *    < > 355* 237* 174*   BUN 40*   < > 32* 36* 36*   CREATININE 3.2*   < > 2.2* 2.6* 2.5*   CALCIUM 8.9   < > 7.2* 8.3* 8.5*   PROT 7.0  --   --   --  6.2   ALBUMIN 3.0*  --   --   --  2.7*   BILITOT 0.6  --   --   --  0.4   ALKPHOS 124  --   --   --  113   AST 14  --   --   --  20   ALT 15  --   --   --  14   ANIONGAP 35*   < > 11 12 10   ESTGFRAFRICA 20.3*   < > 32.0* 26.1* 27.4*   EGFRNONAA 17.6*   < > 27.7* 22.6* 23.7*    < > = values in this interval not displayed.   , CBC   Recent Labs   Lab 01/25/22  1213 01/25/22  1216 01/25/22  2137   WBC 15.48*  --   --    HGB 13.6*  --   --    HCT 45.5   < > 38     --   --     < > = values in this interval not displayed.    and All pertinent lab results from the last 24 hours have been reviewed.    Significant Imaging: Echocardiogram:   Transthoracic echo (TTE) complete (Cupid Only):   Results for orders placed or performed during the hospital encounter of 01/25/22   Echo   Result Value Ref Range    Ascending aorta 3.26 cm    STJ 3.17 cm    AV mean gradient 5 mmHg    Ao peak marc 1.29 m/s    Ao VTI 27.40 cm    IVS 1.01 0.6 - 1.1 cm    LA size 4.30 cm    Left Atrium Major Axis 5.20 cm    Left Atrium Minor Axis 5.60 cm    LVIDd 5.73 3.5 - 6.0 cm    LVIDs 4.25 (A) 2.1 - 4.0 cm    LVOT diameter 2.17 cm    LVOT peak VTI 14.45 cm    Posterior Wall 0.91 0.6 - 1.1 cm    MV Peak A Marc 1.11 m/s    E wave deceleration time 205.32 msec    MV Peak E Marc 1.10 m/s    RA Major Axis 5.47 cm    RA Width 3.82 cm    RVDD 3.42 cm    Sinus 3.26 cm    TAPSE 2.20 cm    TDI LATERAL 0.13 m/s    TDI SEPTAL 0.08 m/s    LA WIDTH 4.50 cm    MV stenosis pressure 1/2 time 59.54 ms    LV Diastolic Volume 162.03 mL    LV Systolic Volume 80.67 mL    LVOT peak marc 0.76 m/s    LV LATERAL E/E' RATIO 8.46 m/s    LV SEPTAL E/E' RATIO 13.75 m/s    FS 26 %    LA volume 88.69 cm3    LV mass 216.56 g    Left Ventricle Relative Wall Thickness 0.32 cm    AV valve area 1.95 cm2    AV Velocity Ratio 0.59     AV  index (prosthetic) 0.53     MV valve area p 1/2 method 3.69 cm2    E/A ratio 0.99     Mean e' 0.11 m/s    LVOT area 3.7 cm2    LVOT stroke volume 53.41 cm3    AV peak gradient 7 mmHg    E/E' ratio 10.48 m/s    BSA 2.21 m2    LV Systolic Volume Index 36.7 mL/m2    LV Diastolic Volume Index 73.65 mL/m2    LA Volume Index 40.3 mL/m2    LV Mass Index 98 g/m2

## 2022-01-26 NOTE — SUBJECTIVE & OBJECTIVE
"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 8/2/2012    Type II or unspecified type diabetes mellitus with neurological manifestations, uncontrolled(250.62) 3/8/2013    Urinary tract infection        Past Surgical History:   Procedure Laterality Date    BACK SURGERY      CATARACT EXTRACTION W/  INTRAOCULAR LENS IMPLANT Right     Per Dr Romero note 11/2018    COLONOSCOPY N/A 1/28/2019    Procedure: COLONOSCOPY Suprep;  Surgeon: Anh Johnson MD;  Location: [facility] ENDO;  Service: Endoscopy;  Laterality: N/A;    EYE SURGERY      HERNIA REPAIR      LEFT HEART CATHETERIZATION Left 1/9/2022    Procedure: CATHETERIZATION, HEART, LEFT;  Surgeon: Will Hurst III, MD;  Location: [facility] CATH LAB/EP;  Service: Cardiology;  Laterality: Left;    renal stones      SHOULDER OPEN ROTATOR CUFF REPAIR         Review of patient's allergies indicates:   Allergen Reactions    Iodine      Other reaction(s): swelling  Other reaction(s): Itching  Other reaction(s): Rash       No current facility-administered medications on file prior to encounter.     Current Outpatient Medications on File Prior to Encounter   Medication Sig    amiodarone (PACERONE) 200 MG Tab Take 2 tablet (400mg) by mouth twice a day for 7 days then 2 tablets (400mg) daily x 7 days then 1 tablet 200mg daily thereafter (Patient taking differently: Take 2 tablet (400mg) by mouth twice a day for 7 days then 2 tablets (400mg) daily x 7 days then 1 tablet 200mg daily thereafter)    apixaban (ELIQUIS) 5 mg Tab Take 1 tablet (5 mg total) by mouth 2 (two) times daily.    aspirin (ECOTRIN) 81 MG EC tablet Take 81 mg by mouth once daily.    atorvastatin (LIPITOR) 40 MG tablet Take 1 tablet (40 mg total) by mouth once daily.    BD ULTRA-FINE SHORT PEN NEEDLE 31 gauge x 5/16" Ndle 3 (three) times " "daily.    blood sugar diagnostic Strp 1 strip by Misc.(Non-Drug; Combo Route) route 2 (two) times a day.    clopidogreL (PLAVIX) 75 mg tablet Take 1 tablet (75 mg total) by mouth once daily.    diclofenac sodium (VOLTAREN) 1 % Gel APPLY 2 GRAMS TO AFFECTED AREA ONCE D    diltiaZEM (CARDIZEM CD) 120 MG Cp24 Take 1 capsule (120 mg total) by mouth once daily.    ergocalciferol (ERGOCALCIFEROL) 50,000 unit Cap     gabapentin (NEURONTIN) 300 MG capsule Take 1 capsule (300 mg total) by mouth 2 (two) times daily. (Patient taking differently: Take 300 mg by mouth 2 (two) times daily. Takes nightly)    glucagon, human recombinant, (GLUCAGON EMERGENCY KIT, HUMAN,) 1 mg SolR Inject 1 mg into the muscle as needed.    hydrocortisone 2.5 % cream APPLY TO GROIN RASH BID NO LONGER THAN 7 DAYS    insulin aspart U-100 (NOVOLOG FLEXPEN U-100 INSULIN) 100 unit/mL (3 mL) InPn pen Inject 17 Units into the skin 3 (three) times daily with meals.    insulin glargine (LANTUS) 100 unit/mL injection Inject 28 Units into the skin every evening.    ketoconazole (NIZORAL) 2 % cream APPLY TO GROIN RASH BID NO LONGER THAN 7 DAYS    lancets (ONETOUCH ULTRASOFT LANCETS) Misc 1 lancet by Misc.(Non-Drug; Combo Route) route 2 (two) times a day.    losartan (COZAAR) 25 MG tablet Take 1 tablet (25 mg total) by mouth once daily.    metFORMIN (GLUCOPHAGE) 500 MG tablet Take 1 tablet (500 mg total) by mouth 2 (two) times daily with meals.    metoprolol succinate (TOPROL-XL) 25 MG 24 hr tablet Take 1 tablet (25 mg total) by mouth once daily.    MULTIVITAMIN ORAL Take 1 tablet by mouth once daily.     nitroGLYCERIN (NITROSTAT) 0.4 MG SL tablet Place 1 tablet (0.4 mg total) under the tongue every 5 (five) minutes as needed for Chest pain.    pantoprazole (PROTONIX) 40 MG tablet Take 1 tablet (40 mg total) by mouth once daily.    pen needle, diabetic (PEN NEEDLE) 30 gauge x 5/16" Ndle 1 Units by Misc.(Non-Drug; Combo Route) route 3 (three) " times daily.    polycarbophil (FIBERCON) 625 mg tablet Take 1 tablet by mouth once daily.      Family History    None       Tobacco Use    Smoking status: Former Smoker     Packs/day: 1.50     Years: 25.00     Pack years: 37.50     Quit date: 1983     Years since quittin.0    Smokeless tobacco: Never Used   Substance and Sexual Activity    Alcohol use: No    Drug use: No    Sexual activity: Yes     Partners: Female     Review of Systems   Constitutional: Negative for fever and malaise/fatigue.   HENT: Negative for congestion and sore throat.    Eyes: Negative for blurred vision, vision loss in left eye and vision loss in right eye.   Cardiovascular: Positive for chest pain and dyspnea on exertion. Negative for irregular heartbeat, leg swelling, near-syncope, palpitations and syncope.   Respiratory: Negative for shortness of breath and wheezing.    Endocrine: Negative.    Hematologic/Lymphatic: Negative.    Skin: Negative.    Musculoskeletal: Negative for arthritis, back pain, joint pain and myalgias.   Gastrointestinal: Negative for abdominal pain, hematemesis, hematochezia and melena.   Genitourinary: Negative.    Neurological: Positive for difficulty with concentration, disturbances in coordination, focal weakness and sensory change. Negative for light-headedness and loss of balance.   Psychiatric/Behavioral: Negative.      Objective:     Vital Signs (Most Recent):  Temp: 97.5 °F (36.4 °C) (22 1843)  Pulse: 80 (22)  Resp: 18 (22)  BP: 130/60 (22)  SpO2: 100 % (22) Vital Signs (24h Range):  Temp:  [96.4 °F (35.8 °C)-97.5 °F (36.4 °C)] 97.5 °F (36.4 °C)  Pulse:  [] 80  Resp:  [15-25] 18  SpO2:  [97 %-100 %] 100 %  BP: (105-153)/() 130/60     Weight: 95.3 kg (210 lb)  Body mass index is 27.71 kg/m².    SpO2: 100 %  O2 Device (Oxygen Therapy): room air      Intake/Output Summary (Last 24 hours) at 20221  Last data filed at 2022  2147  Gross per 24 hour   Intake 3050 ml   Output 250 ml   Net 2800 ml       Lines/Drains/Airways     Drain                 Urethral Catheter 01/25/22 1623 Straight-tip 16 Fr. <1 day          Arterial Line            Arterial Line -- days          Peripheral Intravenous Line                 Peripheral IV - Single Lumen 01/25/22 1551 18 G Left Antecubital <1 day         Peripheral IV - Single Lumen 01/25/22 1601 20 G Right Antecubital <1 day                Physical Exam  Vitals and nursing note reviewed.   Constitutional:       Appearance: He is not toxic-appearing.   HENT:      Head: Normocephalic and atraumatic.   Eyes:      General: No scleral icterus.     Extraocular Movements: Extraocular movements intact.   Neck:      Vascular: No carotid bruit.   Cardiovascular:      Rate and Rhythm: Normal rate and regular rhythm.      Pulses: Normal pulses.      Heart sounds: Normal heart sounds. No murmur heard.  No gallop.    Pulmonary:      Effort: Pulmonary effort is normal. No respiratory distress.      Breath sounds: Normal breath sounds. No wheezing or rales.   Abdominal:      General: Abdomen is flat. Bowel sounds are normal.      Palpations: Abdomen is soft. There is no mass.   Musculoskeletal:      Cervical back: Normal range of motion.      Right lower leg: No edema.      Left lower leg: No edema.   Skin:     General: Skin is warm and dry.      Capillary Refill: Capillary refill takes less than 2 seconds.      Findings: No rash.   Neurological:      Mental Status: He is alert.      Comments: Dysarthric, L facial droop, bilateral ptosis         Significant Labs:   Troponin   Recent Labs   Lab 01/25/22  1213 01/25/22 2001   TROPONINI 0.143* 1.066*       Significant Imaging: Echocardiogram:   Transthoracic echo (TTE) complete ([facility]upid Only):   Results for orders placed or performed during the hospital encounter of 01/12/22   Echo Saline Bubble? Yes   Result Value Ref Range    BSA 2.16 m2    LVIDd 4.90 3.5 - 6.0 cm     IVS 0.90 (A) 0.6 - 1.1 cm    Posterior Wall 1.00 (A) 0.6 - 1.1 cm    LVIDs 2.10 (A) 2.1 - 4.0 cm    FS 57 28 - 44 %    LV mass 164.32 g    Left Ventricle Relative Wall Thickness 0.41 cm    LV Systolic Volume 75.37 mL    LV Systolic Volume Index 35.1 mL/m2    LV Diastolic Volume 135.48 mL    LV Diastolic Volume Index 63.01 mL/m2    LV Mass Index 76 g/m2    LA WIDTH 3.20 cm    EF 55 %    LA volume 52.03 cm3    LA size 3.70 cm    RVDD 2.50 cm    TAPSE 1.60 cm    Right ventricular length in diastole (apical 4-chamber view) 6.00 cm    LA Volume Index 24.2 mL/m2    RA Major Axis 4.40 cm    Left Atrium Minor Axis 4.60 cm    Left Atrium Major Axis 5.90 cm    RA Width 4.00 cm    Narrative    · There is no evidence of intracardiac shunting.  · The left ventricle is normal in size with normal systolic function.  · The estimated ejection fraction is 55%.  · Normal left ventricular diastolic function.  · Normal right ventricular size with normal right ventricular systolic   function.

## 2022-01-26 NOTE — PROGRESS NOTES
[facility] - Emergency Dept  Critical Care Medicine  Progress Note    Patient Name: Kamar Muñoz  MRN: [id number]  Admission Date: 1/25/2022  Hospital Length of Stay: 0 days  Code Status: Full Code  Attending Provider: Amador Connolly*  Primary Care Provider: Basim Guerrero MD   Principal Problem: <principal problem not specified>    Subjective:     HPI:  Per chart review, 76-year-old male with a history of CAD, type 2 diabetes, hyperlipidemia, hypertension, recent stroke without residual deficits who presenting with slurred speech, left facial droop and left-sided weakness via EMS.  They were called for change in mental status and vomiting. On scene he had slurred speech, left-sided weakness and left facial droop.  FSBG greater than 400.  His last well known was approximately 8:00 a.m..  On arrival by EMS, he was able to converse however since his arrival to the ED, patient unable to provide any history, unable to converse, has left-sided facial droop and generalized weakness with left worse than right.        Hospital/ICU Course:  No notes on file    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 8/2/2012    Type II or unspecified type diabetes mellitus with neurological manifestations, uncontrolled(250.62) 3/8/2013    Urinary tract infection        Past Surgical History:   Procedure Laterality Date    BACK SURGERY      CATARACT EXTRACTION W/  INTRAOCULAR LENS IMPLANT Right     Per Dr Romero note 11/2018    COLONOSCOPY N/A 1/28/2019    Procedure: COLONOSCOPY Suprep;  Surgeon: Anh Johnson MD;  Location: [facility];  Service: Endoscopy;  Laterality: N/A;    EYE SURGERY      HERNIA REPAIR      LEFT HEART CATHETERIZATION Left 1/9/2022    Procedure: CATHETERIZATION, HEART, LEFT;  Surgeon: Will Hurst III, MD;  Location:  [facility] CATH LAB/EP;  Service: Cardiology;  Laterality: Left;    renal stones      SHOULDER OPEN ROTATOR CUFF REPAIR         Review of patient's allergies indicates:   Allergen Reactions    Iodine      Other reaction(s): swelling  Other reaction(s): Itching  Other reaction(s): Rash       Family History    None       Tobacco Use    Smoking status: Former Smoker     Packs/day: 1.50     Years: 25.00     Pack years: 37.50     Quit date: 1983     Years since quittin.0    Smokeless tobacco: Never Used   Substance and Sexual Activity    Alcohol use: No    Drug use: No    Sexual activity: Yes     Partners: Female      Review of Systems   Unable to perform ROS: Mental status change     Objective:     Vital Signs (Most Recent):  Temp: 96.4 °F (35.8 °C) (22 1454)  Pulse: 91 (22 1622)  Resp: (!) 25 (22 162)  BP: (!) 113/54 (22 162)  SpO2: 100 % (22 162) Vital Signs (24h Range):  Temp:  [96.4 °F (35.8 °C)] 96.4 °F (35.8 °C)  Pulse:  [] 91  Resp:  [18-25] 25  SpO2:  [97 %-100 %] 100 %  BP: (105-153)/() 113/54   Weight: 95.3 kg (210 lb)  Body mass index is 27.71 kg/m².      Intake/Output Summary (Last 24 hours) at 2022 1658  Last data filed at 2022 1340  Gross per 24 hour   Intake 1000 ml   Output --   Net 1000 ml       Physical Exam  Vitals and nursing note reviewed. Exam conducted with a chaperone present.   Constitutional:       Appearance: He is ill-appearing and toxic-appearing.   HENT:      Head:      Comments: Wearing EEG gauze wrap on my exam     Nose: Nose normal.      Mouth/Throat:      Mouth: Mucous membranes are dry.   Eyes:      Extraocular Movements: Extraocular movements intact.      Pupils: Pupils are equal, round, and reactive to light.   Neck:      Comments: Ranges neck appropriately  Cardiovascular:      Rate and Rhythm: Normal rate and regular rhythm.      Pulses: Normal pulses.   Pulmonary:      Comments: Patient is tachypneic and taking deep  breaths  Chest:      Chest wall: No tenderness.   Abdominal:      General: Abdomen is flat.      Palpations: Abdomen is soft.      Tenderness: There is no abdominal tenderness. There is no guarding or rebound.   Genitourinary:     Comments: Balanitis without crepitus or other signs of deep infection    Musculoskeletal:      Comments: No gross deformity of extremities   Skin:     General: Skin is warm and dry.      Comments: Some sacral redness without any skin breakdown   Neurological:      Comments: Oriented to self and location, not to date, slurred speech/difficult to understand, moves all extremities, answers basic questions appropriately intermittently, follows basic commands intermittently         Vents:     Lines/Drains/Airways     Drain                 Urethral Catheter 01/25/22 1623 Straight-tip 16 Fr. <1 day          Arterial Line            Arterial Line -- days          Peripheral Intravenous Line                 Peripheral IV - Single Lumen 01/25/22 1551 18 G Left Antecubital <1 day         Peripheral IV - Single Lumen 01/25/22 1601 20 G Right Antecubital <1 day              Significant Labs:    CBC/Anemia Profile:  Recent Labs   Lab 01/25/22  1213 01/25/22  1216 01/25/22  1225   WBC 15.48*  --   --    HGB 13.6*  --   --    HCT 45.5 45 47     --   --    MCV 95  --   --    RDW 14.7*  --   --         Chemistries:  Recent Labs   Lab 01/25/22  1213 01/25/22  1410    135*   K 6.2* 6.4*   CL 96 97   CO2 5* 5*   BUN 40* 41*   CREATININE 3.2* 3.4*   CALCIUM 8.9 8.7   ALBUMIN 3.0*  --    PROT 7.0  --    BILITOT 0.6  --    ALKPHOS 124  --    ALT 15  --    AST 14  --    MG 2.0  --        All pertinent labs within the past 24 hours have been reviewed.    Significant Imaging: I have reviewed all pertinent imaging results/findings within the past 24 hours.      ABG  Recent Labs   Lab 01/25/22  1752   PH 7.216*   PO2 54   PCO2 15.6*   HCO3 6.3*   BE -21     Assessment/Plan:     Neuro  Encephalopathy,  metabolic  - likely related to DKA, critical illness may be causing re expression of stroke  - protecting airway    Altered mental status  Likely metabolic  -MRI/MRA brain negative for acute stroke    History of ischemic stroke in prior three months  MRI today shows  Moderate-sized subacute right frontal and left inferior cerebellar infarcts with associated petechial hemorrhage.  Overlying sulcal FLAIR hyperintensity likely reflecting some associated localized subarachnoid blood products.  Holding anticoagulation  Believe this is less likely to be primary  of AMS    Facial droop  Present, noted on prior    Dysarthria and anarthria  -noted prior, has slurred speech on exam    Cardiac/Vascular  Coronary artery disease involving native heart without angina pectoris  Holding home ASA/statin    Paroxysmal atrial fibrillation  - holding home metoprolol xl 25mg in the setting of critical illness    STEMI involving right coronary artery  · [facility] on 1/9/22 with two vessel coronary artery disease with 100% occlusion of mid RCA and 70% stenosis of proximal LAD., RCA stent x2  - trop 0.143 today, no active chest pain, ecg with new LBBB and TWI in inferior leads, repeat trop/ecg pending  - trop was 12 twelve days ago  - trend trop/ecg  - holding home anticoagulation in setting of inability to tolerate PO as well as mild brain bleed noted on MRI    Hypertension associated with diabetes  Holding home meds at this time  -cardizem 120mg qd  -coozar 25mg qd    Renal/  Hyperkalemia  K of 6.2, did not shift in the setting of insulin drip and DKA  - CaGluc ordered  - q2 BMP    BRENDAN (acute kidney injury)  Baseline Cr 1  - 3.2 on arrival  - ctm  - lopez placed    Endocrine  DKA (diabetic ketoacidosis)  Profoundly hyperglycemic, acidotic, +beta hydrox  -insulin drip, K greater than 6 to start  -q2 BMP  -3L LR  -IVC is more than 50% collapsible on POCUS, likely profoundly hypovolemic    Type 2 diabetes mellitus with diabetic peripheral  angiopathy without gangrene, with long-term current use of insulin  See DKA     Critical Care Daily Checklist:    A: Awake: RASS Goal/Actual Goal:    Actual: Long Agitation Sedation Scale (RASS): Alert and calm   B: Spontaneous Breathing Trial Performed?     C: SAT & SBT Coordinated?  Na                      D: Delirium: CAM-ICU     E: Early Mobility Performed? No   F: Feeding Goal:    Status:     Current Diet Order   Procedures    Diet NPO      AS: Analgesia/Sedation none   T: Thromboembolic Prophylaxis Holding   H: HOB > 300 Yes   U: Stress Ulcer Prophylaxis (if needed) Holding   G: Glucose Control Insulin drip   B: Bowel Function     I: Indwelling Catheter (Lines & Lopez) Necessity lopez   D: De-escalation of Antimicrobials/Pharmacotherapies vanc/cefepime    Plan for the day/ETD Insulin drip    Code Status:  Family/Goals of Care: Full Code         Critical secondary to Patient has a condition that poses threat to life and bodily function: DKA requiring insulin drip     Critical care was time spent personally by me on the following activities: development of treatment plan with patient or surrogate and bedside caregivers, discussions with consultants, evaluation of patient's response to treatment, examination of patient, ordering and performing treatments and interventions, ordering and review of laboratory studies, ordering and review of radiographic studies, pulse oximetry, re-evaluation of patient's condition. This critical care time did not overlap with that of any other provider or involve time for any procedures.     Monroe Ochoa MD  Critical Care Medicine  [facility] - Emergency Dept

## 2022-01-26 NOTE — SUBJECTIVE & OBJECTIVE
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 2012    Type II or unspecified type diabetes mellitus with neurological manifestations, uncontrolled(250.62) 3/8/2013    Urinary tract infection        Past Surgical History:   Procedure Laterality Date    BACK SURGERY      CATARACT EXTRACTION W/  INTRAOCULAR LENS IMPLANT Right     Per Dr Romero note 2018    COLONOSCOPY N/A 2019    Procedure: COLONOSCOPY Suprep;  Surgeon: Anh Johnson MD;  Location: [facility] ENDO;  Service: Endoscopy;  Laterality: N/A;    EYE SURGERY      HERNIA REPAIR      LEFT HEART CATHETERIZATION Left 2022    Procedure: CATHETERIZATION, HEART, LEFT;  Surgeon: Will Hurst III, MD;  Location: [facility] CATH LAB/EP;  Service: Cardiology;  Laterality: Left;    renal stones      SHOULDER OPEN ROTATOR CUFF REPAIR         Review of patient's allergies indicates:   Allergen Reactions    Iodine      Other reaction(s): swelling  Other reaction(s): Itching  Other reaction(s): Rash       Family History    None       Tobacco Use    Smoking status: Former Smoker     Packs/day: 1.50     Years: 25.00     Pack years: 37.50     Quit date: 1983     Years since quittin.0    Smokeless tobacco: Never Used   Substance and Sexual Activity    Alcohol use: No    Drug use: No    Sexual activity: Yes     Partners: Female      Review of Systems   Unable to perform ROS: Mental status change   Objective:     Vital Signs (Most Recent):  Temp: 96.4 °F (35.8 °C) (22 1454)  Pulse: 78 (22 1749)  Resp: 19 (22 174)  BP: (!) 108/57 (22 1700)  SpO2: 100 % (22 1749) Vital Signs (24h Range):  Temp:  [96.4 °F (35.8 °C)] 96.4 °F (35.8 °C)  Pulse:  [] 78  Resp:  [17-25] 19  SpO2:  [97 %-100 %] 100 %  BP: (105-153)/() 108/57   Weight: 95.3 kg (210  lb)  Body mass index is 27.71 kg/m².      Intake/Output Summary (Last 24 hours) at 1/25/2022 1815  Last data filed at 1/25/2022 1811  Gross per 24 hour   Intake 1050 ml   Output --   Net 1050 ml       Physical Exam  Vitals and nursing note reviewed. Exam conducted with a chaperone present.   Constitutional:       Appearance: He is ill-appearing and toxic-appearing.   HENT:      Head:      Comments: Wearing EEG gauze wrap on my exam     Nose: Nose normal.      Mouth/Throat:      Mouth: Mucous membranes are dry.   Eyes:      Extraocular Movements: Extraocular movements intact.      Pupils: Pupils are equal, round, and reactive to light.   Neck:      Comments: Ranges neck appropriately  Cardiovascular:      Rate and Rhythm: Normal rate and regular rhythm.      Pulses: Normal pulses.   Pulmonary:      Comments: Patient is tachypneic and taking deep breaths  Chest:      Chest wall: No tenderness.   Abdominal:      General: Abdomen is flat.      Palpations: Abdomen is soft.      Tenderness: There is no abdominal tenderness. There is no guarding or rebound.   Genitourinary:     Comments: Balanitis without crepitus or other signs of deep infection    Musculoskeletal:      Comments: No gross deformity of extremities   Skin:     General: Skin is warm and dry.      Comments: Some sacral redness without any skin breakdown   Neurological:      Comments: Oriented to self and location, not to date, slurred speech/difficult to understand, moves all extremities, answers basic questions appropriately intermittently, follows basic commands intermittently         Vents:     Lines/Drains/Airways     Drain                 Urethral Catheter 01/25/22 1623 Straight-tip 16 Fr. <1 day          Arterial Line            Arterial Line -- days          Peripheral Intravenous Line                 Peripheral IV - Single Lumen 01/25/22 1551 18 G Left Antecubital <1 day         Peripheral IV - Single Lumen 01/25/22 1601 20 G Right Antecubital <1 day               Significant Labs:    CBC/Anemia Profile:  Recent Labs   Lab 01/25/22  1213 01/25/22  1216 01/25/22  1225   WBC 15.48*  --   --    HGB 13.6*  --   --    HCT 45.5 45 47     --   --    MCV 95  --   --    RDW 14.7*  --   --         Chemistries:  Recent Labs   Lab 01/25/22  1213 01/25/22  1410 01/25/22  1607    135* 135*   K 6.2* 6.4* 5.9*   CL 96 97 98   CO2 5* 5* <5*   BUN 40* 41* 41*   CREATININE 3.2* 3.4* 3.8*   CALCIUM 8.9 8.7 8.8   ALBUMIN 3.0*  --   --    PROT 7.0  --   --    BILITOT 0.6  --   --    ALKPHOS 124  --   --    ALT 15  --   --    AST 14  --   --    MG 2.0  --   --        All pertinent labs within the past 24 hours have been reviewed.    Significant Imaging: I have reviewed all pertinent imaging results/findings within the past 24 hours.

## 2022-01-26 NOTE — PROGRESS NOTES
Therapy with vancomycin complete and consult discontinued by provider. Pharmacy will sign off, please re-consult as needed.    Opal Stephens, PharmD  Neurocritical Care Pharmacist  [phone]

## 2022-01-26 NOTE — HPI
76 year old male with a PMHx HTN, HLD, CAD with recent STEMI s/p PCI on 1/9/2022, recently diagnosed atrial fibrillation on Eliquis, uncontrolled DM2 with admissions for DKA, and recent CVA with right frontal and left cerebellar infarcts on 1/12/2022 who presented to the ER via EMS for altered mental status, slurred speech, left facial droop, and left sided weakness. HPI per chart review. EMS reported BG>400. Per chart, patient was unable to converse on arrival to ER. Stroke code called. Patient noted to be tachycardia, tachypneic, and afebrile. Labs revealed WBC 15.48, K 6.2, , BUN/Cr 40/3.2, beta hydroxy 4.8, pH 7.0. MRI Brain revealed moderate-sized subacute right frontal and left inferior cerebellar infarcts with associated petechial hemorrhage, overlying sulcal FLAIR hyperintensity likely reflecting some associated localized subarachnoid blood products, no new hemorrhage or infarct; MRA grossly stable from prior, no new high-grade stenosis or large vessel occlusion. Patient admitted to MICU for further management of encephalopathy, DKA, and BRENDAN. Patient developed chest pain overnight and started on ACS protocol after evaluation by cardiology. Patient transferred to [facility] 1/26/2022 for close neurologic monitoring in setting of heparin drip and cerebral bleed. EEG placed and revealed multiple subclinical seizures with right hemispheric onset. Epilepsy following for assistance in management.

## 2022-01-26 NOTE — NURSING
Patient arrived to [facility] from ED > 9081 by stretcher    Type of stroke/diagnosis: TIA (1 week prior to admit); possible stroke      TPA start and end time: N/A    Thrombectomy start and end time: N/A    Current symptoms: Chest pain, Left facial droop, dysphagia      Skin assessment done: Yes  Wounds noted: blanchable redness on sacrum w/ scabs, generalized bruising    *If wounds noted, was Wound Care consulted? Yes    Ngo Completed? Yes, failed and NGT in place    Patient Belongings on Admit: Robe, slippers, ring    NCC notified: MICU

## 2022-01-26 NOTE — H&P
[facility] - Emergency Dept  Critical Care Medicine  History & Physical    Patient Name: Kamar Muñoz  MRN: [id number]  Admission Date: 1/25/2022  Hospital Length of Stay: 0 days  Code Status: Full Code  Attending Physician: Amador Connolly*   Primary Care Provider: Basim Guerrero MD   Principal Problem: <principal problem not specified>    Subjective:     HPI:  Per chart review, 76-year-old male with a history of CAD, type 2 diabetes, hyperlipidemia, hypertension, recent stroke without residual deficits who presenting with slurred speech, left facial droop and left-sided weakness via EMS.  They were called for change in mental status and vomiting. On scene he had slurred speech, left-sided weakness and left facial droop.  FSBG greater than 400.  His last well known was approximately 8:00 a.m..  On arrival by EMS, he was able to converse however since his arrival to the ED, patient unable to provide any history, unable to converse, has left-sided facial droop and generalized weakness with left worse than right.        Hospital/ICU Course:  No notes on file     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 8/2/2012    Type II or unspecified type diabetes mellitus with neurological manifestations, uncontrolled(250.62) 3/8/2013    Urinary tract infection        Past Surgical History:   Procedure Laterality Date    BACK SURGERY      CATARACT EXTRACTION W/  INTRAOCULAR LENS IMPLANT Right     Per Dr Romero note 11/2018    COLONOSCOPY N/A 1/28/2019    Procedure: COLONOSCOPY Suprep;  Surgeon: Anh Johnson MD;  Location: [facility];  Service: Endoscopy;  Laterality: N/A;    EYE SURGERY      HERNIA REPAIR      LEFT HEART CATHETERIZATION Left 1/9/2022    Procedure: CATHETERIZATION, HEART, LEFT;  Surgeon: Will Hurst III, MD;   Location: [facility] CATH LAB/EP;  Service: Cardiology;  Laterality: Left;    renal stones      SHOULDER OPEN ROTATOR CUFF REPAIR         Review of patient's allergies indicates:   Allergen Reactions    Iodine      Other reaction(s): swelling  Other reaction(s): Itching  Other reaction(s): Rash       Family History    None       Tobacco Use    Smoking status: Former Smoker     Packs/day: 1.50     Years: 25.00     Pack years: 37.50     Quit date: 1983     Years since quittin.0    Smokeless tobacco: Never Used   Substance and Sexual Activity    Alcohol use: No    Drug use: No    Sexual activity: Yes     Partners: Female      Review of Systems   Unable to perform ROS: Mental status change   Objective:     Vital Signs (Most Recent):  Temp: 96.4 °F (35.8 °C) (22 1454)  Pulse: 78 (22 1749)  Resp: 19 (22 1749)  BP: (!) 108/57 (22 1700)  SpO2: 100 % (22 1749) Vital Signs (24h Range):  Temp:  [96.4 °F (35.8 °C)] 96.4 °F (35.8 °C)  Pulse:  [] 78  Resp:  [17-25] 19  SpO2:  [97 %-100 %] 100 %  BP: (105-153)/() 108/57   Weight: 95.3 kg (210 lb)  Body mass index is 27.71 kg/m².      Intake/Output Summary (Last 24 hours) at 2022  Last data filed at 2022 1811  Gross per 24 hour   Intake 1050 ml   Output --   Net 1050 ml       Physical Exam  Vitals and nursing note reviewed. Exam conducted with a chaperone present.   Constitutional:       Appearance: He is ill-appearing and toxic-appearing.   HENT:      Head:      Comments: Wearing EEG gauze wrap on my exam     Nose: Nose normal.      Mouth/Throat:      Mouth: Mucous membranes are dry.   Eyes:      Extraocular Movements: Extraocular movements intact.      Pupils: Pupils are equal, round, and reactive to light.   Neck:      Comments: Ranges neck appropriately  Cardiovascular:      Rate and Rhythm: Normal rate and regular rhythm.      Pulses: Normal pulses.   Pulmonary:      Comments: Patient is tachypneic and taking  deep breaths  Chest:      Chest wall: No tenderness.   Abdominal:      General: Abdomen is flat.      Palpations: Abdomen is soft.      Tenderness: There is no abdominal tenderness. There is no guarding or rebound.   Genitourinary:     Comments: Balanitis without crepitus or other signs of deep infection    Musculoskeletal:      Comments: No gross deformity of extremities   Skin:     General: Skin is warm and dry.      Comments: Some sacral redness without any skin breakdown   Neurological:      Comments: Oriented to self and location, not to date, slurred speech/difficult to understand, moves all extremities, answers basic questions appropriately intermittently, follows basic commands intermittently         Vents:     Lines/Drains/Airways     Drain                 Urethral Catheter 01/25/22 1623 Straight-tip 16 Fr. <1 day          Arterial Line            Arterial Line -- days          Peripheral Intravenous Line                 Peripheral IV - Single Lumen 01/25/22 1551 18 G Left Antecubital <1 day         Peripheral IV - Single Lumen 01/25/22 1601 20 G Right Antecubital <1 day              Significant Labs:    CBC/Anemia Profile:  Recent Labs   Lab 01/25/22  1213 01/25/22  1216 01/25/22  1225   WBC 15.48*  --   --    HGB 13.6*  --   --    HCT 45.5 45 47     --   --    MCV 95  --   --    RDW 14.7*  --   --         Chemistries:  Recent Labs   Lab 01/25/22  1213 01/25/22  1410 01/25/22  1607    135* 135*   K 6.2* 6.4* 5.9*   CL 96 97 98   CO2 5* 5* <5*   BUN 40* 41* 41*   CREATININE 3.2* 3.4* 3.8*   CALCIUM 8.9 8.7 8.8   ALBUMIN 3.0*  --   --    PROT 7.0  --   --    BILITOT 0.6  --   --    ALKPHOS 124  --   --    ALT 15  --   --    AST 14  --   --    MG 2.0  --   --        All pertinent labs within the past 24 hours have been reviewed.    Significant Imaging: I have reviewed all pertinent imaging results/findings within the past 24 hours.    Assessment/Plan:     Neuro  Encephalopathy, metabolic  -  likely related to DKA, critical illness may be causing re expression of stroke  - EEG in progress for further evaluation  - protecting airway    Altered mental status  Likely metabolic  -MRI/MRA brain negative for acute stroke    History of ischemic stroke in prior three months  MRI today shows  Moderate-sized subacute right frontal and left inferior cerebellar infarcts with associated petechial hemorrhage.  Overlying sulcal FLAIR hyperintensity likely reflecting some associated localized subarachnoid blood products.  Holding anticoagulation  Believe this is less likely to be primary  of AMS    Facial droop  Present, noted on prior    Dysarthria and anarthria  -noted prior, has slurred speech on exam    Cardiac/Vascular  Coronary artery disease involving native heart without angina pectoris  Holding home ASA/statin    Paroxysmal atrial fibrillation  - holding home metoprolol xl 25mg in the setting of critical illness    STEMI involving right coronary artery  · [facility] on 1/9/22 with two vessel coronary artery disease with 100% occlusion of mid RCA and 70% stenosis of proximal LAD., RCA stent x2  - trop 0.143 today, no active chest pain, ecg with new LBBB and TWI in inferior leads, repeat trop/ecg pending  - trop was 12 twelve days ago  - trend trop/ecg  - holding home anticoagulation in setting of inability to tolerate PO as well as mild brain bleed noted on MRI    Hypertension associated with diabetes  Holding home meds at this time  -cardizem 120mg qd  -coozar 25mg qd    Renal/  Hyperkalemia  K of 6.2, did not shift in the setting of insulin drip and DKA  - CaGluc ordered  - q2 BMP    BRENDAN (acute kidney injury)  Baseline Cr 1  - 3.2 on arrival  - ctm  - lopez placed    Endocrine  DKA (diabetic ketoacidosis)  Profoundly hyperglycemic, acidotic, +beta hydrox  Concern for infarction with positive trop and ecg changes although this may be persistent tropinemia, no obvious sign of infection, urine pending,  empirically covering, has had nausea and vomiting, dehydration may have led to decreased home insulin use  -insulin drip, K greater than 6 to start  -q2 BMP  -3L LR  -IVC is more than 50% collapsible on POCUS, likely profoundly hypovolemic    Type 2 diabetes mellitus with diabetic peripheral angiopathy without gangrene, with long-term current use of insulin  See DKA        Critical Care Daily Checklist:    A: Awake: RASS Goal/Actual Goal:    Actual: Long Agitation Sedation Scale (RASS): Alert and calm   B: Spontaneous Breathing Trial Performed?     C: SAT & SBT Coordinated?  NA                      D: Delirium: CAM-ICU     E: Early Mobility Performed? No   F: Feeding Goal:    Status:     Current Diet Order   Procedures    Diet NPO      AS: Analgesia/Sedation None   T: Thromboembolic Prophylaxis Holding   H: HOB > 300 Yes   U: Stress Ulcer Prophylaxis (if needed) None   G: Glucose Control Insulin drip   B: Bowel Function     I: Indwelling Catheter (Lines & Mccord) Necessity Yes   D: De-escalation of Antimicrobials/Pharmacotherapies Cef/vanc    Plan for the day/ETD Insulin drip    Code Status:  Family/Goals of Care: Full Code         Critical secondary to Patient has a condition that poses threat to life and bodily function: DKA on insulin drip     Critical care was time spent personally by me on the following activities: development of treatment plan with patient or surrogate and bedside caregivers, discussions with consultants, evaluation of patient's response to treatment, examination of patient, ordering and performing treatments and interventions, ordering and review of laboratory studies, ordering and review of radiographic studies, pulse oximetry, re-evaluation of patient's condition. This critical care time did not overlap with that of any other provider or involve time for any procedures.     Mnoroe Ochoa MD  Critical Care Medicine  [facility] - Emergency Dept

## 2022-01-26 NOTE — PLAN OF CARE
Received call from Primary team regarding blood seen on MRI.  Discussed with Dr SHREYA Hernandez Vascular neurology Fellow and this is subacute hemorrhagic conversion from old infarct. Patient has been on Eliquis so is safe to start heparin drip

## 2022-01-26 NOTE — HOSPITAL COURSE
EEG 1/25>1/26: multiple subclinical seizures with right hemispheric onset  EEG 1/26>1/27: moderate diffuse background slowing, no clinical or electrographic seizures, patient removes majority of electrodes by end of study

## 2022-01-26 NOTE — ASSESSMENT & PLAN NOTE
- likely related to DKA, critical illness may be causing re expression of stroke  - EEG in progress for further evaluation  - protecting airway  - improving this AM

## 2022-01-26 NOTE — ASSESSMENT & PLAN NOTE
Uncontrolled DM2, last A1c 12.3 on 12/2021  - Admitted w/DKA and placed on insulin gtt  - Management per NCC

## 2022-01-26 NOTE — HPI
Kamar Muñoz is a 76-year-old male with a history of CAD, type 2 diabetes, hyperlipidemia, hypertension, recent stroke without residual deficits who presenting with slurred speech, left facial droop and left-sided weakness via EMS.  Initially pt had a change in mental status and vomiting, also noted to have slurred speech, LKN was reportedly 8am. Noted to  have a BG greater than 400 at the seen. On arrival to ED symptoms continued to worsen, on imaging noted to have some subacute hemorrhagic conversion from old infarct. He also c/o chest pain similar to his recent ACS, but EKG & Trop, cardiology consulted  & recommended started ACS protocol: start heparin gtt, reload with plavix 300mg x1 and then 75mg qD, and continue 81mg ASA. Since patient has been on Eliquis, stroke team recommended is safe to start heparin drip. Patient was also on insulin drip for DKA. Contacted MICU team this AM to discuss management plan for the patient, and agreed that would be better to transfer to NCC service given requirement for close NCC monitorng while starting Heparin drip in context cerebral bleed.

## 2022-01-26 NOTE — ED NOTES
I-STAT Chem-8+ Results:   Value Reference Range   Sodium 140 136-145 mmol/L   Potassium  4.0 3.5-5.1 mmol/L   Chloride 106  mmol/L   Ionized Calcium 1.18 1.06-1.42 mmol/L   CO2 (measured) 14 23-29 mmol/L   Glucose 524  mg/dL   BUN 40 6-30 mg/dL   Creatinine 2.4 0.5-1.4 mg/dL   Hematocrit 39 36-54%

## 2022-01-26 NOTE — PLAN OF CARE
[facility] - Neuro Critical Care  Initial Discharge Assessment       Primary Care Provider: Basim Guerrero MD    Admission Diagnosis: Myocardial infarction [I21.9]  CAD (coronary artery disease) [I25.10]  Hyperglycemia [R73.9]  Stroke [I63.9]  Elevated troponin [R77.8]  Diabetic ketoacidosis without coma associated with type 2 diabetes mellitus [E11.10]  Altered mental status, unspecified altered mental status type [R41.82]    Admission Date: 1/25/2022  Expected Discharge Date: 2/1/2022    Discharge Barriers Identified: None    Payor: [employer] MEDICARE / Plan: [employer] MEDICARE HMO / Product Type: Capitation /     Extended Emergency Contact Information  Primary Emergency Contact: Marisa Sampson  Mobile Phone: [phone]  Relation: Daughter  Preferred language: English   needed? No  Secondary Emergency Contact: Muñoz,Linda  Address: [address] [place] [address] [place] [address] [facility]  Home Phone: [phone]  Mobile Phone: [phone]  Relation: Spouse    Discharge Plan A: Home with family,Home Health  Discharge Plan B: Rehab      [id number] - [place] - [address] AT [facility] [address]  [address] [place] [address]  Phone: [phone] Fax: [phone]    [facility] [place]  [address] [facility] [address]  [place] [address]  Phone: [phone] Fax: [phone]      Transferred from:  [place]    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 8/2/2012    Type II or unspecified type diabetes mellitus with neurological manifestations, uncontrolled(250.62) 3/8/2013    Urinary tract infection          CM met with patient in room for Discharge Planning Assessment.  Patient is able to answer questions.  Per patient, he lives with Aimee Muñoz (wife)  [phone] in a single story house with 1 step(s) to enter.   Per patient, he was independent with ADLS and used no dme for ambulation.  Patient will have assistance from his wife upon discharge.   Discharge Planning Booklet given to patient/family and discussed.  All questions addressed.  CM will follow for needs.    Patient is current with [facility] and would like to continue if home health is recommended.     Initial Assessment (most recent)     Adult Discharge Assessment - 01/26/22 1518        Discharge Assessment    Assessment Type Discharge Planning Assessment     Confirmed/corrected address, phone number and insurance Yes     Confirmed Demographics Correct on Facesheet     Source of Information patient     Communicated ALLIE with patient/caregiver Date not available/Unable to determine     Reason For Admission Encephalopathy, metabolic     Lives With spouse     Facility Arrived From: [facility]     Do you expect to return to your current living situation? Yes     Do you have help at home or someone to help you manage your care at home? Yes     Who are your caregiver(s) and their phone number(s)? Aimee Muñoz (wife) [phone]     Prior to hospitilization cognitive status: Alert/Oriented     Current cognitive status: Alert/Oriented     Walking or Climbing Stairs Difficulty none     Dressing/Bathing Difficulty none     Home Accessibility stairs to enter home     Number of Stairs, Main Entrance one     Home Layout Able to live on 1st floor     Equipment Currently Used at Home none     Readmission within 30 days? No     Patient currently being followed by outpatient case management? No     Do you currently have service(s) that help you manage your care at home? Yes     Name and Contact number of agency [facility]     Is the pt/caregiver preference to resume services with current agency Yes     Do you take prescription medications? Yes     Do you have prescription coverage? Yes     Coverage [employer]      Do you have any problems affording any of your prescribed medications? No     Is the patient taking medications as prescribed? yes     Who is going to help you get home at discharge? Aimee Muñoz (wife) [phone]     How do you get to doctors appointments? family or friend will provide     Are you on dialysis? No     Do you take coumadin? No     Discharge Plan A Home with family;Home Health     Discharge Plan B Rehab     DME Needed Upon Discharge  other (see comments)   tbd    Discharge Plan discussed with: Patient     Discharge Barriers Identified None        Relationship/Environment    Name(s) of Who Lives With Patient Aimee Muñoz (wife) [phone]                  Whit Tripp RN, CCRN-K, [facility]  Neuro-Critical Care   [phone]

## 2022-01-26 NOTE — ASSESSMENT & PLAN NOTE
- Recent CVA with R frontal and L cerebellar infarcts on 1/12/2022 who presented to the ER 1/25/2022 via EMS for AMS, slurred speech, left facial droop, and LSW  - MRI Brain 1/25 revealed moderate-sized subacute right frontal and left inferior cerebellar infarcts with associated petechial hemorrhage, overlying sulcal FLAIR hyperintensity likely reflecting some associated localized subarachnoid blood products, no new hemorrhage or infarct; MRA grossly stable from prior, no new high-grade stenosis or large vessel occlusion  - Management per Vascular Neurology, NCC

## 2022-01-26 NOTE — SUBJECTIVE & OBJECTIVE
"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 8/2/2012    Type II or unspecified type diabetes mellitus with neurological manifestations, uncontrolled(250.62) 3/8/2013    Urinary tract infection        Past Surgical History:   Procedure Laterality Date    BACK SURGERY      CATARACT EXTRACTION W/  INTRAOCULAR LENS IMPLANT Right     Per Dr Romero note 11/2018    COLONOSCOPY N/A 1/28/2019    Procedure: COLONOSCOPY Suprep;  Surgeon: Anh Johnson MD;  Location: [facility] ENDO;  Service: Endoscopy;  Laterality: N/A;    EYE SURGERY      HERNIA REPAIR      LEFT HEART CATHETERIZATION Left 1/9/2022    Procedure: CATHETERIZATION, HEART, LEFT;  Surgeon: Will Hurst III, MD;  Location: [facility] CATH LAB/EP;  Service: Cardiology;  Laterality: Left;    renal stones      SHOULDER OPEN ROTATOR CUFF REPAIR         Review of patient's allergies indicates:   Allergen Reactions    Iodine      Other reaction(s): swelling  Other reaction(s): Itching  Other reaction(s): Rash       No current facility-administered medications on file prior to encounter.     Current Outpatient Medications on File Prior to Encounter   Medication Sig    amiodarone (PACERONE) 200 MG Tab Take 2 tablet (400mg) by mouth twice a day for 7 days then 2 tablets (400mg) daily x 7 days then 1 tablet 200mg daily thereafter (Patient taking differently: Take 2 tablet (400mg) by mouth twice a day for 7 days then 2 tablets (400mg) daily x 7 days then 1 tablet 200mg daily thereafter)    apixaban (ELIQUIS) 5 mg Tab Take 1 tablet (5 mg total) by mouth 2 (two) times daily.    aspirin (ECOTRIN) 81 MG EC tablet Take 81 mg by mouth once daily.    atorvastatin (LIPITOR) 40 MG tablet Take 1 tablet (40 mg total) by mouth once daily.    BD ULTRA-FINE SHORT PEN NEEDLE 31 gauge x 5/16" Ndle 3 (three) times " "daily.    blood sugar diagnostic Strp 1 strip by Misc.(Non-Drug; Combo Route) route 2 (two) times a day.    clopidogreL (PLAVIX) 75 mg tablet Take 1 tablet (75 mg total) by mouth once daily.    diclofenac sodium (VOLTAREN) 1 % Gel APPLY 2 GRAMS TO AFFECTED AREA ONCE D    diltiaZEM (CARDIZEM CD) 120 MG Cp24 Take 1 capsule (120 mg total) by mouth once daily.    ergocalciferol (ERGOCALCIFEROL) 50,000 unit Cap     gabapentin (NEURONTIN) 300 MG capsule Take 1 capsule (300 mg total) by mouth 2 (two) times daily. (Patient taking differently: Take 300 mg by mouth 2 (two) times daily. Takes nightly)    glucagon, human recombinant, (GLUCAGON EMERGENCY KIT, HUMAN,) 1 mg SolR Inject 1 mg into the muscle as needed.    hydrocortisone 2.5 % cream APPLY TO GROIN RASH BID NO LONGER THAN 7 DAYS    insulin aspart U-100 (NOVOLOG FLEXPEN U-100 INSULIN) 100 unit/mL (3 mL) InPn pen Inject 17 Units into the skin 3 (three) times daily with meals.    insulin glargine (LANTUS) 100 unit/mL injection Inject 28 Units into the skin every evening.    ketoconazole (NIZORAL) 2 % cream APPLY TO GROIN RASH BID NO LONGER THAN 7 DAYS    lancets (ONETOUCH ULTRASOFT LANCETS) Misc 1 lancet by Misc.(Non-Drug; Combo Route) route 2 (two) times a day.    losartan (COZAAR) 25 MG tablet Take 1 tablet (25 mg total) by mouth once daily.    metFORMIN (GLUCOPHAGE) 500 MG tablet Take 1 tablet (500 mg total) by mouth 2 (two) times daily with meals.    metoprolol succinate (TOPROL-XL) 25 MG 24 hr tablet Take 1 tablet (25 mg total) by mouth once daily.    MULTIVITAMIN ORAL Take 1 tablet by mouth once daily.     nitroGLYCERIN (NITROSTAT) 0.4 MG SL tablet Place 1 tablet (0.4 mg total) under the tongue every 5 (five) minutes as needed for Chest pain.    pantoprazole (PROTONIX) 40 MG tablet Take 1 tablet (40 mg total) by mouth once daily.    pen needle, diabetic (PEN NEEDLE) 30 gauge x 5/16" Ndle 1 Units by Misc.(Non-Drug; Combo Route) route 3 (three) " times daily.    polycarbophil (FIBERCON) 625 mg tablet Take 1 tablet by mouth once daily.      Continuous Infusions:   dextrose 5 % and 0.9 % NaCl 100 mL/hr at 22 1200    heparin (porcine) in D5W 12 Units/kg/hr (22 1200)       Family History    None       Tobacco Use    Smoking status: Former Smoker     Packs/day: 1.50     Years: 25.00     Pack years: 37.50     Quit date: 1983     Years since quittin.0    Smokeless tobacco: Never Used   Substance and Sexual Activity    Alcohol use: No    Drug use: No    Sexual activity: Yes     Partners: Female     Review of Systems   Unable to perform ROS: Acuity of condition     Objective:     Vital Signs (Most Recent):  Temp: 98 °F (36.7 °C) (22 1100)  Pulse: 77 (22 1200)  Resp: 17 (22 1200)  BP: (!) 180/85 (22 1200)  SpO2: 99 % (22 1200) Vital Signs (24h Range):  Temp:  [96.4 °F (35.8 °C)-98.4 °F (36.9 °C)] 98 °F (36.7 °C)  Pulse:  [76-91] 77  Resp:  [13-30] 17  SpO2:  [92 %-100 %] 99 %  BP: (108-180)/() 180/85     Weight: 95.3 kg (210 lb)  Body mass index is 27.71 kg/m².    Physical Exam  Constitutional:       General: He is not in acute distress.     Appearance: He is not diaphoretic.   HENT:      Head: Normocephalic.      Comments: EEG in place  Eyes:      General: No scleral icterus.        Right eye: No discharge.         Left eye: No discharge.      Conjunctiva/sclera: Conjunctivae normal.      Pupils: Pupils are equal, round, and reactive to light.   Cardiovascular:      Rate and Rhythm: Normal rate.   Pulmonary:      Effort: Pulmonary effort is normal. No respiratory distress.   Abdominal:      General: There is no distension.      Palpations: Abdomen is soft.      Tenderness: There is no abdominal tenderness.   Musculoskeletal:         General: No deformity or signs of injury.   Skin:     General: Skin is warm and dry.   Neurological:      Comments: Oriented to self, month, year, and president  Disoriented  to place (says he is at home)   Psychiatric:         Mood and Affect: Mood normal.         Behavior: Behavior normal.         NEUROLOGICAL EXAMINATION:     CRANIAL NERVES     CN III, IV, VI   Pupils are equal, round, and reactive to light.       Arouses to verbal stimuli  THADDEUS OU   Corneal intact OU   + spontaneous eye opening   L facial droop  Tongue midline   Moves all extremities   Follows commands    Exam findings to suggest seizures:  Myoclonus - no   eye twitching - no   Nystagmus - no   gaze deviation - no   waxy rigidity - no        Significant Labs: All pertinent lab results from the past 24 hours have been reviewed.    Significant Studies: I have reviewed all pertinent imaging results/findings within the past 24 hours.

## 2022-01-26 NOTE — ED NOTES
I-STAT Chem-8+ Results:   Value Reference Range   Sodium 140   136-145 mmol/L   Potassium  4.1 3.5-5.1 mmol/L   Chloride 105  mmol/L   Ionized Calcium 1.21 1.06-1.42 mmol/L   CO2 (measured) 18 23-29 mmol/L   Glucose 467  mg/dL   BUN 49 6-30 mg/dL   Creatinine 2.4 0.5-1.4 mg/dL   Hematocrit 38 36-54%

## 2022-01-26 NOTE — PROGRESS NOTES
[facility]  Wound Care    Patient Name:  Kamar Muñoz   MRN:  [id number]  Date: 2022  Diagnosis: Encephalopathy, metabolic    History:     Past Medical History:   Diagnosis Date    Arthritis     Coronary artery disease     Diabetes mellitus type II     Hyperlipidemia     Hypertension     Kidney stone     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 2012    Type II or unspecified type diabetes mellitus with neurological manifestations, uncontrolled(250.62) 3/8/2013    Urinary tract infection        Social History     Socioeconomic History    Marital status:    Tobacco Use    Smoking status: Former Smoker     Packs/day: 1.50     Years: 25.00     Pack years: 37.50     Quit date: 1983     Years since quittin.0    Smokeless tobacco: Never Used   Substance and Sexual Activity    Alcohol use: No    Drug use: No    Sexual activity: Yes     Partners: Female   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     Social Determinants of Health     Financial Resource Strain: Low Risk     Difficulty of Paying Living Expenses: Not hard at all   Food Insecurity: No Food Insecurity    Worried About Running Out of Food in the Last Year: Never true    Ran Out of Food in the Last Year: Never true   Transportation Needs: No Transportation Needs    Lack of Transportation (Medical): No    Lack of Transportation (Non-Medical): No   Housing Stability: Low Risk     Unable to Pay for Housing in the Last Year: No    Number of Places Lived in the Last Year: 1    Unstable Housing in the Last Year: No       Precautions:     Allergies as of 2022 - Reviewed 2022   Allergen Reaction Noted    Iodine  2012       [facility] Assessment Details/Treatment     Wound consult received on skin breakdown to sacrum.    Purple to maroon discoloration, blistered tissue noted with dry callus appearing tissue over midline/sacral spine/coccyx. Unknown etiology, pressure vs  moisture?. Skin breakdown present on admission.   Ivanna low airloss surface, positioning supports, moisture wicking pads utilized. Continue pressure injury prevention measures per nursing.    Recommend applying triad barrier cream daily and as needed to assist with moisture management.    Nursing to continue care. Wound care to assist as needed.       01/26/22 0841        Altered Skin Integrity 01/26/22 0841 Sacral spine Purple or maroon localized area of discolored intact skin or non-intact skin or a blood-filled blister.   Date First Assessed/Time First Assessed: 01/26/22 0841   Altered Skin Integrity Present on Admission: yes  Location: Sacral spine  Description of Altered Skin Integrity: Purple or maroon localized area of discolored intact skin or non-intact skin or a...   Wound Image    Description of Altered Skin Integrity Purple or maroon localized area of discolored intact skin or non-intact skin or a blood-filled blister.   Drainage Amount None   Drainage Characteristics/Odor No odor   Appearance Purple;Maroon;Blistered  (callus appearing tissue over midline sacral spine/coccyx)   Periwound Area Redness   Wound Length (cm) 4 cm   Wound Width (cm) 4 cm   Wound Depth (cm) 0 cm   Wound Volume (cm^3) 0 cm^3   Wound Surface Area (cm^2) 16 cm^2   Skin Interventions   Device Skin Pressure Protection absorbent pad utilized/changed;positioning supports utilized   Pressure Reduction Devices foam padding utilized;positioning supports utilized;pressure-redistributing mattress utilized  (ivanna low air loss surface)   Skin Protection incontinence pads utilized

## 2022-01-26 NOTE — ASSESSMENT & PLAN NOTE
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 Stroke code called. MRI Brain revealed moderate-sized subacute R frontal and L inferior cerebellar infarcts with associated petechial hemorrhage, overlying sulcal FLAIR hyperintensity likely reflecting some associated localized subarachnoid blood products, no new hemorrhage or infarct; MRA grossly stable from prior, no new high-grade stenosis or large vessel occlusion. Patient admitted to MICU for further management of encephalopathy, DKA, and BRENDAN. Patient developed chest pain overnight and started on ACS protocol for NSTEMI after evaluation by cardiology. Patient transferred to [facility] 1/26/2022 for close neurologic monitoring in setting of heparin drip and cerebral bleed. EEG revealed multiple subclinical seizures with R hemispheric onset. Epilepsy following for assistance in management.     Recommendations:  - Continuous vEEG monitoring  - Recommend initiating AED: IV Lacosamide 400 mg x1 followed by 100 mg BID ordered  - Cefepime discontinued 1/26  - Avoid agents that lower seizure threshold  - Management of infectious/metabolic abnormalities per NCC  - Seizure precautions      Discussed plan of care with patient and NCC team. No family at bedside Will follow, please call with questions.

## 2022-01-26 NOTE — H&P
[facility]  Neurocritical Care  History & Physical    Admit Date: 1/25/2022  Service Date: 01/26/2022  Length of Stay: 1    Subjective:     Chief Complaint: Encephalopathy, metabolic    History of Present Illness: Kamar Muñoz is a 76-year-old male with a history of CAD, type 2 diabetes, hyperlipidemia, hypertension, recent stroke without residual deficits who presenting with slurred speech, left facial droop and left-sided weakness via EMS.  Initially pt had a change in mental status and vomiting, also noted to have slurred speech, LKN was reportedly 8am. Noted to  have a BG greater than 400 at the seen. On arrival to ED symptoms continued to worsen, on imaging noted to have some subacute hemorrhagic conversion from old infarct. He also c/o chest pain similar to his recent ACS, but EKG & Trop, cardiology consulted  & recommended started ACS protocol: start heparin gtt, reload with plavix 300mg x1 and then 75mg qD, and continue 81mg ASA. Since patient has been on Eliquis, stroke team recommended is safe to start heparin drip. Patient was also on insulin drip for DKA. Contacted MICU team this AM to discuss management plan for the patient, and agreed that would be better to transfer to NCC service given requirement for close NCC monitorng while starting Heparin drip in context cerebral bleed.      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 8/2/2012    Type II or unspecified type diabetes mellitus with neurological manifestations, uncontrolled(250.62) 3/8/2013    Urinary tract infection      Past Surgical History:   Procedure Laterality Date    BACK SURGERY      CATARACT EXTRACTION W/  INTRAOCULAR LENS IMPLANT Right     Per Dr Romero note 11/2018    COLONOSCOPY N/A 1/28/2019    Procedure: COLONOSCOPY Suprep;  Surgeon:  "Anh Johnson MD;  Location: [facility] ENDO;  Service: Endoscopy;  Laterality: N/A;    EYE SURGERY      HERNIA REPAIR      LEFT HEART CATHETERIZATION Left 1/9/2022    Procedure: CATHETERIZATION, HEART, LEFT;  Surgeon: Will Hurst III, MD;  Location: [facility] CATH LAB/EP;  Service: Cardiology;  Laterality: Left;    renal stones      SHOULDER OPEN ROTATOR CUFF REPAIR        No current facility-administered medications on file prior to encounter.     Current Outpatient Medications on File Prior to Encounter   Medication Sig Dispense Refill    amiodarone (PACERONE) 200 MG Tab Take 2 tablet (400mg) by mouth twice a day for 7 days then 2 tablets (400mg) daily x 7 days then 1 tablet 200mg daily thereafter (Patient taking differently: Take 2 tablet (400mg) by mouth twice a day for 7 days then 2 tablets (400mg) daily x 7 days then 1 tablet 200mg daily thereafter) 90 tablet 3    apixaban (ELIQUIS) 5 mg Tab Take 1 tablet (5 mg total) by mouth 2 (two) times daily. 60 tablet 5    aspirin (ECOTRIN) 81 MG EC tablet Take 81 mg by mouth once daily.      atorvastatin (LIPITOR) 40 MG tablet Take 1 tablet (40 mg total) by mouth once daily. 90 tablet 3    BD ULTRA-FINE SHORT PEN NEEDLE 31 gauge x 5/16" Ndle 3 (three) times daily.      blood sugar diagnostic Strp 1 strip by Misc.(Non-Drug; Combo Route) route 2 (two) times a day. 200 strip 6    clopidogreL (PLAVIX) 75 mg tablet Take 1 tablet (75 mg total) by mouth once daily. 30 tablet 11    diclofenac sodium (VOLTAREN) 1 % Gel APPLY 2 GRAMS TO AFFECTED AREA ONCE D      diltiaZEM (CARDIZEM CD) 120 MG Cp24 Take 1 capsule (120 mg total) by mouth once daily. 90 capsule 3    ergocalciferol (ERGOCALCIFEROL) 50,000 unit Cap       gabapentin (NEURONTIN) 300 MG capsule Take 1 capsule (300 mg total) by mouth 2 (two) times daily. (Patient taking differently: Take 300 mg by mouth 2 (two) times daily. Takes nightly) 180 capsule 3    glucagon, human recombinant, (GLUCAGON " "EMERGENCY KIT, HUMAN,) 1 mg SolR Inject 1 mg into the muscle as needed. 1 each 0    hydrocortisone 2.5 % cream APPLY TO GROIN RASH BID NO LONGER THAN 7 DAYS      insulin aspart U-100 (NOVOLOG FLEXPEN U-100 INSULIN) 100 unit/mL (3 mL) InPn pen Inject 17 Units into the skin 3 (three) times daily with meals. 45.9 mL 3    insulin glargine (LANTUS) 100 unit/mL injection Inject 28 Units into the skin every evening. 25.2 mL 3    ketoconazole (NIZORAL) 2 % cream APPLY TO GROIN RASH BID NO LONGER THAN 7 DAYS      lancets (ONETOUCH ULTRASOFT LANCETS) Misc 1 lancet by Misc.(Non-Drug; Combo Route) route 2 (two) times a day. 200 each 6    losartan (COZAAR) 25 MG tablet Take 1 tablet (25 mg total) by mouth once daily. 90 tablet 3    metFORMIN (GLUCOPHAGE) 500 MG tablet Take 1 tablet (500 mg total) by mouth 2 (two) times daily with meals. 180 tablet 3    metoprolol succinate (TOPROL-XL) 25 MG 24 hr tablet Take 1 tablet (25 mg total) by mouth once daily. 30 tablet 11    MULTIVITAMIN ORAL Take 1 tablet by mouth once daily.       nitroGLYCERIN (NITROSTAT) 0.4 MG SL tablet Place 1 tablet (0.4 mg total) under the tongue every 5 (five) minutes as needed for Chest pain. 25 tablet 11    pantoprazole (PROTONIX) 40 MG tablet Take 1 tablet (40 mg total) by mouth once daily. 30 tablet 11    pen needle, diabetic (PEN NEEDLE) 30 gauge x 5/16" Ndle 1 Units by Misc.(Non-Drug; Combo Route) route 3 (three) times daily. 100 each 3    polycarbophil (FIBERCON) 625 mg tablet Take 1 tablet by mouth once daily.         Allergies: Iodine    Family History   Problem Relation Age of Onset    Prostate cancer Neg Hx     Kidney disease Neg Hx      Social History     Tobacco Use    Smoking status: Former Smoker     Packs/day: 1.50     Years: 25.00     Pack years: 37.50     Quit date: 1983     Years since quittin.0    Smokeless tobacco: Never Used   Substance Use Topics    Alcohol use: No    Drug use: No     Review of Systems "     Objective:     Vitals:    Temp: 97.7 °F (36.5 °C)  Pulse: 85  BP: (!) 176/77  MAP (mmHg): 111  Resp: (!) 21  SpO2: 99 %  O2 Device (Oxygen Therapy): room air    Temp  Min: 96.4 °F (35.8 °C)  Max: 98.4 °F (36.9 °C)  Pulse  Min: 76  Max: 105  BP  Min: 105/52  Max: 176/77  MAP (mmHg)  Min: 78  Max: 111  Resp  Min: 13  Max: 30  SpO2  Min: 92 %  Max: 100 %    01/25 0701 - 01/26 0700  In: 4314.6 [I.V.:1430.2]  Out: 750 [Urine:750]           Physical Exam  --sedation: none  --GCS: [id number]  --Mental Status: A&O x 3, follows commands   --CN II-XII grossly intact.   --Pupils 3-->2mm, PERRL.   --brainstem: intact  --Motor: moves all extremities without focality noted  --sensory: regards to touch throughout  --Reflexes: not tested  --Gait: deferred      Today I personally reviewed pertinent medications, lines/drains/airways, imaging, cardiology results, laboratory results, microbiology results,     Assessment/Plan:   Neuro  Encephalopathy, metabolic  - likely related to DKA, critical illness may be causing re expression of stroke  - EEG  - Concern for infectious process triggering AMS/DKA     History of ischemic stroke in prior three months  MRI today shows  Moderate-sized subacute right frontal and left inferior cerebellar infarcts with associated petechial hemorrhage.  Likely localized subarachnoid blood products.  Ok to resume anticoagulation per stroke and cardiology recs; benefits outweigh the risks  Focal weakness resolved     Facial droop  See above     Dysarthria and anarthria       See above     Cardiac/Vascular  Coronary artery disease involving native heart without angina pectoris  Resume DAPT/statin   Heparin gtt in the meantime; CTH when therapeutic     Paroxysmal atrial fibrillation  - Metoprolol, amio, dilt  - Anticoagulation      STEMI involving right coronary artery       [facility] on 1/[facility]22 with two vessel coronary artery disease with 100% occlusion of mid RCA and 70% stenosis of proximal LAD., RCA stent x2  -  trop 0.143 today, no active chest pain, ecg with new LBBB and TWI in inferior leads, repeat trop/ecg pending  - trop was 12 twelve days ago  - trend trop/ecg  - Heparin gtt in the meantime    Infectious process  Abx  Infectious w/u     Hypertension associated with diabetes  SBP <140     Renal/  Hyperkalemia  K of 6.2, did not shift in the setting of insulin drip and DKA  - monitor CMP     BRENDAN (acute kidney injury)  Baseline Cr 1  - 3.2 on arrival  - monitor s.cr & I&O  - lopez placed  - Avoid nephrotoxins     Endocrine  DKA (diabetic ketoacidosis)  Profoundly hyperglycemic, acidotic, +beta hydrox  Concern for infarction with positive trop and ecg changes although this may be persistent tropinemia  -s/p insulin drip, K greater than 6 initially   -monitor BG  -IVF     Type 2 diabetes mellitus with diabetic peripheral angiopathy without gangrene, with long-term current use of insulin  See DKA      The patient is being Prophylaxed for:  Venous Thromboembolism with: Mechanical or Chemical  Stress Ulcer with: PPI  Ventilator Pneumonia with: not applicable    Activity Orders          Diet NPO: NPO starting at 01/25 1323        Full Code    Oz Toledo MD  Neurocritical Care  [facility]

## 2022-01-26 NOTE — HOSPITAL COURSE
1/26 DKA/HHS, AMS, NSTEMI, insulin drip off overnight, improving mental status, ok to start DAPT/heparin for NSTEMI per vascular neurology in setting of brain bleed

## 2022-01-26 NOTE — ASSESSMENT & PLAN NOTE
Holding home meds this AM, can likely restart pending swallow study  -cardizem 120mg qd  -coozar 25mg qd

## 2022-01-26 NOTE — HOSPITAL COURSE
76yoM admitted for treatment of encepalopathy with cardiology consulted due to elevated troponin and ECG findings of diffuse TWI (no STD/CORINNA) concerning for NSTEMI. ACS protocol intiated. DAPT started with ASA and plavix. Pt anticoagulated on heparin drip. High intensity stain continued. Lopressor 12.5mg BID, losartan 25mg qqd and diltiazem 30mg q8h commenced. Troponin values now decreasing after overnight trending (1.06->1.3->1.589->1.494). ECG still with inferolateral TWIs. Echo showing EF 50% with abnormal septal and LV WMA. Not currently a good inpatient [facility] candidate due to decreased renal function of CKD-IIIa with Cr 1.7 and GFR 37.8 (previously GFR > 60 on 12/29/21).

## 2022-01-26 NOTE — PT/OT/SLP EVAL
"Speech Language Pathology Evaluation  Bedside Swallow    Patient Name:  Kamar Muñoz   MRN:  [id number]  Admitting Diagnosis: Encephalopathy, metabolic    Recommendations:                 General Recommendations:  Dysphagia therapy  Diet recommendations:  Mechanical soft, Thin   Aspiration Precautions: HOB to 90 degrees, Meds whole 1 at a time and Standard aspiration precautions   General Precautions: Standard, fall,aspiration  Communication strategies:  none    History:     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 8/2/2012    Type II or unspecified type diabetes mellitus with neurological manifestations, uncontrolled(250.62) 3/8/2013    Urinary tract infection        Past Surgical History:   Procedure Laterality Date    BACK SURGERY      CATARACT EXTRACTION W/  INTRAOCULAR LENS IMPLANT Right     Per Dr Romero note 11/2018    COLONOSCOPY N/A 1/28/2019    Procedure: COLONOSCOPY Suprep;  Surgeon: Anh Johnson MD;  Location: [facility] ENDO;  Service: Endoscopy;  Laterality: N/A;    EYE SURGERY      HERNIA REPAIR      LEFT HEART CATHETERIZATION Left 1/9/2022    Procedure: CATHETERIZATION, HEART, LEFT;  Surgeon: Will Hurst III, MD;  Location: [facility] CATH LAB/EP;  Service: Cardiology;  Laterality: Left;    renal stones      SHOULDER OPEN ROTATOR CUFF REPAIR         Social History: Patient lives with spouse.      Prior diet: regular with thin        Subjective     "I I wear my teeth" per pt  Patient goals: home  Pain/Comfort:  · Pain Rating 1: 0/10  · Pain Rating Post-Intervention 1: 0/10    Respiratory Status: Room air    Objective:     Oral Musculature Evaluation  · Oral Musculature: WFL  · Dentition: scattered dentition,partials,uses dentures to eat  · Secretion Management: adequate  · Mucosal Quality: good  · Mandibular Strength and " Mobility: WFL  · Oral Labial Strength and Mobility: WFL  · Lingual Strength and Mobility: WFL  · Velar Elevation: WFL  · Buccal Strength and Mobility: WFL  · Volitional Cough: strong  · Volitional Swallow: no delay  · Voice Prior to PO Intake: wfl    Bedside Swallow Eval:   Consistencies Assessed:  · Thin liquids 2 teaspoons then self fed 6 ounces of water via cup with a straw  · Puree 3 teaspoons  · Solids 1/4 cracker     Oral Phase:   · WFL    Pharyngeal Phase:   · no overt clinical signs/symptoms of aspiration  · no overt clinical signs/symptoms of pharyngeal dysphagia    Compensatory Strategies  · None    Treatment: Partial plates are not present in hospital and pt. Stated that he does wear partials when eating.  Mech soft diet with thin liquids reocommended till partial plates can be brought to hospital.  Education provided to pt. Re safe swallow strategies    Assessment:     Kamar Muñoz is a 76 y.o. male with no s/s of aspiration  Goals:   Multidisciplinary Problems     SLP Goals        Problem: SLP Goal    Goal Priority Disciplines Outcome   SLP Goal     SLP Ongoing, Progressing   Description: Goals due 2/3  1.  Tolerate mech soft diet with thin liquids with no s/s of aspiration  2.  Tolerate trials of regular diet with thin liquids with no s/s of aspiration                   Plan:     · Patient to be seen:  3 x/week   · Plan of Care expires:  02/22/22  · Plan of Care reviewed with:  patient   · SLP Follow-Up:  Yes       Discharge recommendations:  home   Barriers to Discharge:  None    Time Tracking:     SLP Treatment Date:   01/26/22  Speech Start Time:  1150  Speech Stop Time:  1158     Speech Total Time (min):  8 min    Billable Minutes: Eval Swallow and Oral Function 8    01/26/2022

## 2022-01-26 NOTE — PLAN OF CARE
Mech soft diet with thin liquids recommended.  OK to advance diet when dental plates arrive  Problem: SLP Goal  Goal: SLP Goal  Description: Goals due 2/3  1.  Tolerate mech soft diet with thin liquids with no s/s of aspiration  2.  Tolerate trials of regular diet with thin liquids with no s/s of aspiration  Outcome: Ongoing, Progressing

## 2022-01-26 NOTE — SUBJECTIVE & OBJECTIVE
"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 8/2/2012    Type II or unspecified type diabetes mellitus with neurological manifestations, uncontrolled(250.62) 3/8/2013    Urinary tract infection      Past Surgical History:   Procedure Laterality Date    BACK SURGERY      CATARACT EXTRACTION W/  INTRAOCULAR LENS IMPLANT Right     Per Dr Romero note 11/2018    COLONOSCOPY N/A 1/28/2019    Procedure: COLONOSCOPY Suprep;  Surgeon: Anh Johnson MD;  Location: [facility] ENDO;  Service: Endoscopy;  Laterality: N/A;    EYE SURGERY      HERNIA REPAIR      LEFT HEART CATHETERIZATION Left 1/9/2022    Procedure: CATHETERIZATION, HEART, LEFT;  Surgeon: Will Hurst III, MD;  Location: [facility] CATH LAB/EP;  Service: Cardiology;  Laterality: Left;    renal stones      SHOULDER OPEN ROTATOR CUFF REPAIR        No current facility-administered medications on file prior to encounter.     Current Outpatient Medications on File Prior to Encounter   Medication Sig Dispense Refill    amiodarone (PACERONE) 200 MG Tab Take 2 tablet (400mg) by mouth twice a day for 7 days then 2 tablets (400mg) daily x 7 days then 1 tablet 200mg daily thereafter (Patient taking differently: Take 2 tablet (400mg) by mouth twice a day for 7 days then 2 tablets (400mg) daily x 7 days then 1 tablet 200mg daily thereafter) 90 tablet 3    apixaban (ELIQUIS) 5 mg Tab Take 1 tablet (5 mg total) by mouth 2 (two) times daily. 60 tablet 5    aspirin (ECOTRIN) 81 MG EC tablet Take 81 mg by mouth once daily.      atorvastatin (LIPITOR) 40 MG tablet Take 1 tablet (40 mg total) by mouth once daily. 90 tablet 3    BD ULTRA-FINE SHORT PEN NEEDLE 31 gauge x 5/16" Ndle 3 (three) times daily.      blood sugar diagnostic Strp 1 strip by Misc.(Non-Drug; Combo Route) route 2 (two) times a day. 200 " "strip 6    clopidogreL (PLAVIX) 75 mg tablet Take 1 tablet (75 mg total) by mouth once daily. 30 tablet 11    diclofenac sodium (VOLTAREN) 1 % Gel APPLY 2 GRAMS TO AFFECTED AREA ONCE D      diltiaZEM (CARDIZEM CD) 120 MG Cp24 Take 1 capsule (120 mg total) by mouth once daily. 90 capsule 3    ergocalciferol (ERGOCALCIFEROL) 50,000 unit Cap       gabapentin (NEURONTIN) 300 MG capsule Take 1 capsule (300 mg total) by mouth 2 (two) times daily. (Patient taking differently: Take 300 mg by mouth 2 (two) times daily. Takes nightly) 180 capsule 3    glucagon, human recombinant, (GLUCAGON EMERGENCY KIT, HUMAN,) 1 mg SolR Inject 1 mg into the muscle as needed. 1 each 0    hydrocortisone 2.5 % cream APPLY TO GROIN RASH BID NO LONGER THAN 7 DAYS      insulin aspart U-100 (NOVOLOG FLEXPEN U-100 INSULIN) 100 unit/mL (3 mL) InPn pen Inject 17 Units into the skin 3 (three) times daily with meals. 45.9 mL 3    insulin glargine (LANTUS) 100 unit/mL injection Inject 28 Units into the skin every evening. 25.2 mL 3    ketoconazole (NIZORAL) 2 % cream APPLY TO GROIN RASH BID NO LONGER THAN 7 DAYS      lancets (ONETOUCH ULTRASOFT LANCETS) Misc 1 lancet by Misc.(Non-Drug; Combo Route) route 2 (two) times a day. 200 each 6    losartan (COZAAR) 25 MG tablet Take 1 tablet (25 mg total) by mouth once daily. 90 tablet 3    metFORMIN (GLUCOPHAGE) 500 MG tablet Take 1 tablet (500 mg total) by mouth 2 (two) times daily with meals. 180 tablet 3    metoprolol succinate (TOPROL-XL) 25 MG 24 hr tablet Take 1 tablet (25 mg total) by mouth once daily. 30 tablet 11    MULTIVITAMIN ORAL Take 1 tablet by mouth once daily.       nitroGLYCERIN (NITROSTAT) 0.4 MG SL tablet Place 1 tablet (0.4 mg total) under the tongue every 5 (five) minutes as needed for Chest pain. 25 tablet 11    pantoprazole (PROTONIX) 40 MG tablet Take 1 tablet (40 mg total) by mouth once daily. 30 tablet 11    pen needle, diabetic (PEN NEEDLE) 30 gauge x 5/16" Ndle 1 " Units by Misc.(Non-Drug; Combo Route) route 3 (three) times daily. 100 each 3    polycarbophil (FIBERCON) 625 mg tablet Take 1 tablet by mouth once daily.         Allergies: Iodine    Family History   Problem Relation Age of Onset    Prostate cancer Neg Hx     Kidney disease Neg Hx      Social History     Tobacco Use    Smoking status: Former Smoker     Packs/day: 1.50     Years: 25.00     Pack years: 37.50     Quit date: 1983     Years since quittin.0    Smokeless tobacco: Never Used   Substance Use Topics    Alcohol use: No    Drug use: No     Review of Systems     Objective:     Vitals:    Temp: 97.7 °F (36.5 °C)  Pulse: 85  BP: (!) 176/77  MAP (mmHg): 111  Resp: (!) 21  SpO2: 99 %  O2 Device (Oxygen Therapy): room air    Temp  Min: 96.4 °F (35.8 °C)  Max: 98.4 °F (36.9 °C)  Pulse  Min: 76  Max: 105  BP  Min: 105/52  Max: 176/77  MAP (mmHg)  Min: 78  Max: 111  Resp  Min: 13  Max: 30  SpO2  Min: 92 %  Max: 100 %     0701 -  0700  In: 4314.6 [I.V.:1430.2]  Out: 750 [Urine:750]           Physical Exam  --sedation: none  --GCS: [id number]  --Mental Status: A&O x 3, follows commands   --CN II-XII grossly intact.   --Pupils 3-->2mm, PERRL.   --brainstem: intact  --Motor: moves all extremities without focality noted  --sensory: regards to touch throughout  --Reflexes: not tested  --Gait: deferred      Today I personally reviewed pertinent medications, lines/drains/airways, imaging, cardiology results, laboratory results, microbiology results,

## 2022-01-26 NOTE — PROCEDURES
EEG    Date/Time: 1/25/2022 11:09 AM  Performed by: Domingo Denny MD  Authorized by: Susanne Irvin MD       ICU EEG/VIDEO MONITORING REPORT    DATE OF SERVICE: 1/25/22-1/26/22  EEG NUMBER: [id number]  REQUESTED BY:  Brandee  [place] [facility] [place]    METHODOLOGY   Electroencephalographic (EEG) recording is with electrodes placed according to the International 10-20 placement system.  Thirty two (32) channels of digital signal are simultaneously recorded from the scalp and may include EKG, EMG, and/or eye monitors.   Recording band pass was 0.1 to 512 hz.  Digital video recording of the patient is simultaneously recorded with the EEG.  The nursing staff report clinical symptoms and may press an event button when the patient has symptoms of clinical interest to the treating physicians.  EEG and video recording is stored and archived in digital format.  The entire recording is visually reviewed and the times identified by computer analysis as being spikes or seizures are reviewed again.  Activation procedures which include photic stimulation, hyperventilation and instructing patients to perform simple task are done in selected patients.   Compresses spectral analysis (CSA) is also performed on the activity recorded from each individual channel.  This is displayed as a power display of frequencies from 0 to 30 Hz over time.   The CSA analysis is done and displayed continuously.  This is reviewed for asymmetries in power between homologous areas of the scalp and for presence of changes in power which canbe seen when seizures occur.  Sections of suspected abnormalities on the CSA is then compared with the original EEG recording.     [employer] software was also utilized in the review of this study.  This software suite analyzes the EEG recording in multiple domains.  Coherence and rhythmicity is computed to identify EEG sections which may contain organized seizures.  Each channel undergoes analysis to detect  presence of spike and sharp waves which have special and morphological characteristic of epileptic activity.  The routine EEG recording is converted from spacial into frequency domain.  This is then displayed comparing homologous areas to identify areas of significant asymmetry.  Algorithm to identify non-cortically generated artifact is used to separate eye movement, EMG and other artifact from the EEG.      Recording Times  Start on 1/25/22 at 14:54:54  Stop on 1/26/22 at 07:00:03   A total of 9 hours and 57 minutes of EEG was recorded.    EEG FINDINGS  The record shows a fair  organization at rest, consisting of a 8 Hz posterior dominant rhythm with good  reactivity. There is mild bilateral beta activity. There is bilateral, independent continuous delta and theta range background slowing with shifting predominance.     Drowsiness is characterized by attenuation of the background, vertex waves, and bilateral theta slowing. Stage II sleep is characterized by slowing, vertex waves, and symmetric sleep spindles.    Provocative maneuvers including hyperventilation and photic stimulation were not performed.     EKG recording shows a regular rhythm.    There are four subclinical seizures at 15:16, 15:28, 17:10 and 17:43 all beginning with sharply contoured rhythmic theta activity over the right hemisphere maximal at C4 initially at 7-9 Hz before slowing to 2-3 Hz with brief postictal background attenuation. Each seizure lasts approximately 30 seconds.     IMPRESSION:  Abnormal study due to the presence of multiple subclinical seizures with right hemispheric onset consistent with a focal onset seizure disorder.     Domingo Denny MD  Department of Neurology  [facility]

## 2022-01-26 NOTE — HPI
77yo M with pmhx of 2 vessel CAD s/p PCI on 1/9/2022 with Dr. Cifuentes (LAUREN to prox and mid RCA 2/2 to 100%  of RCA, 70% pLAD and 60% dLAD lesions not intervened on), recently diagnosed AFib (on eliquis), DM2, HLD, HTN, recent CVA on 1/14/2022 who presents to the ED with chest pain and stroke like symptoms    Pt was recently admitted to [facility] for a STEMI on 1/9/2022.  Renal function normal at that time.  Went to cath lab with Dr. Cifuentes on 1/9/2022 and found to have 100% RCA  and 70% pLAD and 60% dLAD lesions.  Culprit lesion thought to be RCA and 2 LAUREN placed in prox and mid RCA.  His chest pain resolved at that time.  Was started on DAPT.  On 1/14/2022 he was admitted with a stroke.  Found to have R MCA and L cerebellar infarcts along with petichial hemorrhage.  He was d/c'ed on triple therapy but was going to start eliquis after a week per the pt.  Today he was sitting down and then per primary team became altered, non verbal and not following commands.  Moreover found to have L facial droop.  He also said he had similar diffuse chest pain that he felt when he had his STEMI in early Jan.  He said he takes all of his medications and has not missed any DAPT medications.  Had CTH which was neg for new changes.  MRI revealed previous stable infarcts and stable petichial hemorrhage.  Symptoms not thought to be due to CVA.  Moreover he was found to be in DKA with glu of 700s and lactate of 13.  Trop obtained initially was .143 and then increased to 1.066.  EKG showed stable diffuse TWI inversions, no STD or CORINNA.  Cardiology was c/s for NSTEMI.

## 2022-01-26 NOTE — ASSESSMENT & PLAN NOTE
His chest pain is atypical however similar to his pain he experienced with his STEMI.  He says he is compliant with his DAPT but unclear if so.  Is tolerating eliquis as well.  Had PCI to  of RCA on 1/9/2022 however LAD lesions not intervened on.  No new EKG changes.  Last EF was 55% on 1/13/2022.  - discussed with ICU team and cardiology staff/interventional fellow  - recommend to discuss with vascular neurology regarding his eliquis  - if ok with vascular neuro, recommend to start pt on ACS protocol: start heparin gtt, reload with plavix 300mg x1 and then 75mg qD, and continue 81mg ASA  - echo for any new WMA  - will hold on making pt NPO for now given he is in DKA and his Cr is 2.2 in setting of previous normal Cr and his abnormal neurological status  - nitro PRN for any chest discomfort

## 2022-01-26 NOTE — PLAN OF CARE
[facility] called to the bedside for new complaints of active chest pain. Patient describes chest pain similar to his prior ACS requiring RCA stent placement. Repeat troponin increased from 0.143 to 1.066. EKG changes with flattening of T waves in V2-V3 with ongoing T wave inversions in II, III, aVF. Cardiology consulted for concerns for ACS. Vascular neurology contacted to discuss MRI findings with subacute parenchymal hemorrhage.        Of note, AMS improved. Patient now awake, alert and oriented x3. EEG remains in place but can likely be discontinued.     Plans:  --follow up cardiology recs. They will discuss findings with interventional cardiology  --Discussing with vascular neuro about potential need for DAPT / anticoagulation.   --TTE stat  --EKG in one hour  --Troponin q4  --Holding further IVF resuscitation due to new cardiac findings    Addendum at 22:50: Discussed subacute hemorrhagic conversion with vascular neuro who state this is stable and ok to start anticoagulation / DAPT. Discussed with cardiology who wish to start ACS protocol. Plavix and low intensity heparin infusion initiated.     Discussed with PCCM fellow, Dr. Gaming.     Critical care time: 35 minutes      Ramya Douglass PA-C  Critical Care Medicine  1/25/2022   10:04 PM

## 2022-01-26 NOTE — ASSESSMENT & PLAN NOTE
- likely related to DKA, critical illness may be causing re expression of stroke  - EEG in progress for further evaluation  - protecting airway

## 2022-01-26 NOTE — ASSESSMENT & PLAN NOTE
His chest pain is atypical however similar to his pain he experienced with his STEMI.  He says he is compliant with his DAPT but unclear if so.  Is tolerating eliquis as well.  Had PCI to  of RCA on 1/9/2022 however LAD lesions not intervened on.  No new EKG changes.  Last EF was 55% on 1/13/2022.    - Discussed with ICU team and cardiology staff/interventional fellow  - Started pt on ACS protocol: heparin gtt, plavix 75mg qD, and ASA 81mg.  - Manage hypertension with home medications: lopressor, losartan, & diltiazem  - Discontinue tropinin and ECG trending   - F/U TTE results for WMAs  - Nitro PRN for any chest discomfort

## 2022-01-26 NOTE — CONSULTS
[facility]  Neurology-Epilepsy  Consult Note    Patient Name: Kamar Muñoz  MRN: [id number]  Admission Date: 1/25/2022  Hospital Length of Stay: 1 days  Code Status: Full Code   Attending Provider: Thom Carson MD   Consulting Provider: Jazmín Sánchez PA-C  Primary Care Physician: Basim Guerrero MD  Principal Problem:Encephalopathy, metabolic    Consults   Subjective:     HPI:   76 year old male with a PMHx HTN, HLD, CAD with recent STEMI s/p PCI on 1/9/2022, recently diagnosed atrial fibrillation on Eliquis, uncontrolled DM2 with admissions for DKA, and recent CVA with right frontal and left cerebellar infarcts on 1/12/2022 who presented to the ER via EMS for altered mental status, slurred speech, left facial droop, and left sided weakness. HPI per chart review. EMS reported BG>400. Per chart, patient was unable to converse on arrival to ER. Stroke code called. Patient noted to be tachycardia, tachypneic, and afebrile. Labs revealed WBC 15.48, K 6.2, , BUN/Cr 40/3.2, beta hydroxy 4.8, pH 7.0. MRI Brain revealed moderate-sized subacute right frontal and left inferior cerebellar infarcts with associated petechial hemorrhage, overlying sulcal FLAIR hyperintensity likely reflecting some associated localized subarachnoid blood products, no new hemorrhage or infarct; MRA grossly stable from prior, no new high-grade stenosis or large vessel occlusion. Patient admitted to MICU for further management of encephalopathy, DKA, and BRENDAN. Patient developed chest pain overnight and started on ACS protocol after evaluation by cardiology. Patient transferred to [facility] 1/26/2022 for close neurologic monitoring in setting of heparin drip and cerebral bleed. EEG placed and revealed multiple subclinical seizures with right hemispheric onset. Epilepsy following for assistance in management.         Hospital Course:   EEG 1/25>1/26: multiple subclinical seizures with right hemispheric onset      "  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 8/2/2012    Type II or unspecified type diabetes mellitus with neurological manifestations, uncontrolled(250.62) 3/8/2013    Urinary tract infection        Past Surgical History:   Procedure Laterality Date    BACK SURGERY      CATARACT EXTRACTION W/  INTRAOCULAR LENS IMPLANT Right     Per Dr Romero note 11/2018    COLONOSCOPY N/A 1/28/2019    Procedure: COLONOSCOPY Suprep;  Surgeon: Anh Johnson MD;  Location: [facility] ENDO;  Service: Endoscopy;  Laterality: N/A;    EYE SURGERY      HERNIA REPAIR      LEFT HEART CATHETERIZATION Left 1/9/2022    Procedure: CATHETERIZATION, HEART, LEFT;  Surgeon: Will Hurst III, MD;  Location: [facility] CATH LAB/EP;  Service: Cardiology;  Laterality: Left;    renal stones      SHOULDER OPEN ROTATOR CUFF REPAIR         Review of patient's allergies indicates:   Allergen Reactions    Iodine      Other reaction(s): swelling  Other reaction(s): Itching  Other reaction(s): Rash       No current facility-administered medications on file prior to encounter.     Current Outpatient Medications on File Prior to Encounter   Medication Sig    amiodarone (PACERONE) 200 MG Tab Take 2 tablet (400mg) by mouth twice a day for 7 days then 2 tablets (400mg) daily x 7 days then 1 tablet 200mg daily thereafter (Patient taking differently: Take 2 tablet (400mg) by mouth twice a day for 7 days then 2 tablets (400mg) daily x 7 days then 1 tablet 200mg daily thereafter)    apixaban (ELIQUIS) 5 mg Tab Take 1 tablet (5 mg total) by mouth 2 (two) times daily.    aspirin (ECOTRIN) 81 MG EC tablet Take 81 mg by mouth once daily.    atorvastatin (LIPITOR) 40 MG tablet Take 1 tablet (40 mg total) by mouth once daily.    BD ULTRA-FINE SHORT PEN NEEDLE 31 gauge x 5/16" Ndle 3 (three) " "times daily.    blood sugar diagnostic Strp 1 strip by Misc.(Non-Drug; Combo Route) route 2 (two) times a day.    clopidogreL (PLAVIX) 75 mg tablet Take 1 tablet (75 mg total) by mouth once daily.    diclofenac sodium (VOLTAREN) 1 % Gel APPLY 2 GRAMS TO AFFECTED AREA ONCE D    diltiaZEM (CARDIZEM CD) 120 MG Cp24 Take 1 capsule (120 mg total) by mouth once daily.    ergocalciferol (ERGOCALCIFEROL) 50,000 unit Cap     gabapentin (NEURONTIN) 300 MG capsule Take 1 capsule (300 mg total) by mouth 2 (two) times daily. (Patient taking differently: Take 300 mg by mouth 2 (two) times daily. Takes nightly)    glucagon, human recombinant, (GLUCAGON EMERGENCY KIT, HUMAN,) 1 mg SolR Inject 1 mg into the muscle as needed.    hydrocortisone 2.5 % cream APPLY TO GROIN RASH BID NO LONGER THAN 7 DAYS    insulin aspart U-100 (NOVOLOG FLEXPEN U-100 INSULIN) 100 unit/mL (3 mL) InPn pen Inject 17 Units into the skin 3 (three) times daily with meals.    insulin glargine (LANTUS) 100 unit/mL injection Inject 28 Units into the skin every evening.    ketoconazole (NIZORAL) 2 % cream APPLY TO GROIN RASH BID NO LONGER THAN 7 DAYS    lancets (ONETOUCH ULTRASOFT LANCETS) Misc 1 lancet by Misc.(Non-Drug; Combo Route) route 2 (two) times a day.    losartan (COZAAR) 25 MG tablet Take 1 tablet (25 mg total) by mouth once daily.    metFORMIN (GLUCOPHAGE) 500 MG tablet Take 1 tablet (500 mg total) by mouth 2 (two) times daily with meals.    metoprolol succinate (TOPROL-XL) 25 MG 24 hr tablet Take 1 tablet (25 mg total) by mouth once daily.    MULTIVITAMIN ORAL Take 1 tablet by mouth once daily.     nitroGLYCERIN (NITROSTAT) 0.4 MG SL tablet Place 1 tablet (0.4 mg total) under the tongue every 5 (five) minutes as needed for Chest pain.    pantoprazole (PROTONIX) 40 MG tablet Take 1 tablet (40 mg total) by mouth once daily.    pen needle, diabetic (PEN NEEDLE) 30 gauge x 5/16" Ndle 1 Units by Misc.(Non-Drug; Combo Route) route 3 " (three) times daily.    polycarbophil (FIBERCON) 625 mg tablet Take 1 tablet by mouth once daily.      Continuous Infusions:   dextrose 5 % and 0.9 % NaCl 100 mL/hr at 22 1200    heparin (porcine) in D5W 12 Units/kg/hr (22 1200)       Family History    None       Tobacco Use    Smoking status: Former Smoker     Packs/day: 1.50     Years: 25.00     Pack years: 37.50     Quit date: 1983     Years since quittin.0    Smokeless tobacco: Never Used   Substance and Sexual Activity    Alcohol use: No    Drug use: No    Sexual activity: Yes     Partners: Female     Review of Systems   Unable to perform ROS: Acuity of condition     Objective:     Vital Signs (Most Recent):  Temp: 98 °F (36.7 °C) (22 1100)  Pulse: 77 (22 1200)  Resp: 17 (22 1200)  BP: (!) 180/85 (22 1200)  SpO2: 99 % (22 1200) Vital Signs (24h Range):  Temp:  [96.4 °F (35.8 °C)-98.4 °F (36.9 °C)] 98 °F (36.7 °C)  Pulse:  [76-91] 77  Resp:  [13-30] 17  SpO2:  [92 %-100 %] 99 %  BP: (108-180)/() 180/85     Weight: 95.3 kg (210 lb)  Body mass index is 27.71 kg/m².    Physical Exam  Constitutional:       General: He is not in acute distress.     Appearance: He is not diaphoretic.   HENT:      Head: Normocephalic.      Comments: EEG in place  Eyes:      General: No scleral icterus.        Right eye: No discharge.         Left eye: No discharge.      Conjunctiva/sclera: Conjunctivae normal.      Pupils: Pupils are equal, round, and reactive to light.   Cardiovascular:      Rate and Rhythm: Normal rate.   Pulmonary:      Effort: Pulmonary effort is normal. No respiratory distress.   Abdominal:      General: There is no distension.      Palpations: Abdomen is soft.      Tenderness: There is no abdominal tenderness.   Musculoskeletal:         General: No deformity or signs of injury.   Skin:     General: Skin is warm and dry.   Neurological:      Comments: Oriented to self, month, year, and  president  Disoriented to place (says he is at home)   Psychiatric:         Mood and Affect: Mood normal.         Behavior: Behavior normal.         NEUROLOGICAL EXAMINATION:     CRANIAL NERVES     CN III, IV, VI   Pupils are equal, round, and reactive to light.       Arouses to verbal stimuli  THADDEUS OU   Corneal intact OU   + spontaneous eye opening   L facial droop  Tongue midline   Moves all extremities   Follows commands    Exam findings to suggest seizures:  Myoclonus - no   eye twitching - no   Nystagmus - no   gaze deviation - no   waxy rigidity - no        Significant Labs: All pertinent lab results from the past 24 hours have been reviewed.    Significant Studies: I have reviewed all pertinent imaging results/findings within the past 24 hours.    Assessment and Plan:     Focal seizures  76M PMHx HTN, HLD, CAD with recent STEMI s/p PCI on 1/9/2022, afib on Eliquis, uncontrolled DM2 with admissions for DKA, and recent CVA with R frontal and L cerebellar infarcts on 1/12/2022 who presented to the ER via EMS for AMS, slurred speech, L facial droop, and LSW. Stroke code called. MRI Brain revealed moderate-sized subacute R frontal and L inferior cerebellar infarcts with associated petechial hemorrhage, overlying sulcal FLAIR hyperintensity likely reflecting some associated localized subarachnoid blood products, no new hemorrhage or infarct; MRA grossly stable from prior, no new high-grade stenosis or large vessel occlusion. Patient admitted to MICU for further management of encephalopathy, DKA, and BRENDAN. Patient developed chest pain overnight and started on ACS protocol for NSTEMI after evaluation by cardiology. Patient transferred to [facility] 1/26/2022 for close neurologic monitoring in setting of heparin drip and cerebral bleed. EEG revealed multiple subclinical seizures with R hemispheric onset. Epilepsy following for assistance in management.     Recommendations:  - Continuous vEEG monitoring  - Recommend initiating  AED: IV Lacosamide 400 mg x1 followed by 100 mg BID ordered  - Cefepime discontinued 1/26  - Avoid agents that lower seizure threshold  - Management of infectious/metabolic abnormalities per NCC  - Seizure precautions      Discussed plan of care with patient and NCC team. No family at bedside Will follow, please call with questions.    NSTEMI (non-ST elevated myocardial infarction)  PMHx of CAD with recent STEMI s/p PCI on 1/9/2022  - Patient developed chest pain overnight and started on ACS protocol after evaluation by cardiology  - On heparin gtt  - Management per Cardiology, [facility]    History of ischemic stroke in prior three months  - Recent CVA with R frontal and L cerebellar infarcts on 1/12/2022 who presented to the ER 1/25/2022 via EMS for AMS, slurred speech, left facial droop, and LSW  - MRI Brain 1/25 revealed moderate-sized subacute right frontal and left inferior cerebellar infarcts with associated petechial hemorrhage, overlying sulcal FLAIR hyperintensity likely reflecting some associated localized subarachnoid blood products, no new hemorrhage or infarct; MRA grossly stable from prior, no new high-grade stenosis or large vessel occlusion  - Management per Vascular Neurology, [facility]    BRENDAN (acute kidney injury)  - Cr 3.2 on admit  - Trending down, Cr 2.5 today  - Management per [facility]    DKA (diabetic ketoacidosis)  Uncontrolled DM2, last A1c 12.3 on 12/2021  - Admitted w/DKA and placed on insulin gtt  - Management per [facility]      VTE Risk Mitigation (From admission, onward)         Ordered     heparin 25,000 units in dextrose 5% 250 mL (100 units/mL) infusion LOW INTENSITY nomogram - [facility]  Continuous        Question Answer Comment   Heparin Infusion Adjustment (DO NOT MODIFY ANSWER) [email or web address]\[email or web address]\Images\Pharmacy\HeparinInfusions\heparin LOW INTENSITY nomogram for [facility] [id number]    Begin at (in units/kg/hr) 12        01/25/22 0065     Place sequential compression device  Until discontinued         01/25/22  2247     IP VTE HIGH RISK PATIENT  Once         01/25/22 1766                Thank you for your consult. I will follow-up with patient. Please contact us if you have any additional questions.    Jazmín Sánchez PA-C  Neurology-Epilepsy  [facility]  Staff: Dr. Denny

## 2022-01-26 NOTE — ASSESSMENT & PLAN NOTE
PMHx of CAD with recent STEMI s/p PCI on 1/9/2022  - Patient developed chest pain overnight and started on ACS protocol after evaluation by cardiology  - On heparin gtt  - Management per Cardiology, NCC

## 2022-01-26 NOTE — PLAN OF CARE
[facility] Care Plan    POC reviewed with Kamar Muñoz at 1400. Pt verbalized understanding. Questions and concerns addressed. Pt progressing toward goals. Will continue to monitor. See below and flowsheets for full assessment and VS info.   Heparin gtt at 15u/hr.  d5NS at 50ml/hr.  cEEG in place.      Neuro:  [place] Coma Scale  Best Eye Response: 4-->(E4) spontaneous  Best Motor Response: 6-->(M6) obeys commands  Best Verbal Response: 5-->(V5) oriented  [place] Coma Scale Score: 15        24 hr Temp:  [97.5 °F (36.4 °C)-98.4 °F (36.9 °C)]     CV:   Rhythm: normal sinus rhythm  BP goals:   SBP < 140  MAP > 65    Resp:   O2 Device (Oxygen Therapy): room air       Plan: N/A    GI/:     Diet/Nutrition Received: NPO     Voiding Characteristics: urethral catheter (bladder)    Intake/Output Summary (Last 24 hours) at 1/26/2022 1756  Last data filed at 1/26/2022 1700  Gross per 24 hour   Intake 3647.26 ml   Output 1860 ml   Net 1787.26 ml          Labs/Accuchecks:  Recent Labs   Lab 01/25/22  1213 01/25/22  1216 01/25/22  2137   WBC 15.48*  --   --    RBC 4.81  --   --    HGB 13.6*  --   --    HCT 45.5   < > 38     --   --     < > = values in this interval not displayed.      Recent Labs   Lab 01/26/22  1255      K 4.2   CO2 24   *   BUN 40*   CREATININE 2.1*   ALKPHOS 107   ALT 18   AST 41*   BILITOT 0.5      Recent Labs   Lab 01/25/22  2301 01/26/22  0628 01/26/22  1255   INR 1.0  --   --    APTT 21.2   < > 31.9    < > = values in this interval not displayed.      Recent Labs   Lab 01/26/22  1512   TROPONINI 0.820*       Electrolytes: Contraindicated  Accuchecks: Q4H    Gtts:   dextrose 5 % and 0.9 % NaCl 50 mL/hr at 01/26/22 1419    heparin (porcine) in D5W 15 Units/kg/hr (01/26/22 1700)       LDA/Wounds:  Lines/Drains/Airways       Drain                   Urethral Catheter 01/25/22 1623 Straight-tip 16 Fr. 1 day              Peripheral Intravenous Line                   Peripheral IV - Single  Lumen 01/25/22 1601 20 G Right Antecubital 1 day         Peripheral IV - Single Lumen 01/26/22 0148 22 G Left Hand <1 day         Peripheral IV - Single Lumen 01/26/22 1325 20 G Anterior;Left;Proximal Forearm <1 day                  Wounds: Yes  Wound care consulted: Yes

## 2022-01-26 NOTE — ASSESSMENT & PLAN NOTE
K of 6.2, did not shift in the setting of insulin drip and DKA  - CaGluc ordered  - q2 BMP  - improving this AM

## 2022-01-26 NOTE — ASSESSMENT & PLAN NOTE
- likely related to DKA, critical illness may be causing re expression of stroke  - protecting airway

## 2022-01-26 NOTE — PROGRESS NOTES
[facility]  Cardiology  Progress Note    Patient Name: Kaamr Muñoz  MRN: [id number]  Admission Date: 1/25/2022  Hospital Length of Stay: 1 days  Code Status: Full Code   Attending Physician: Thom Carson MD   Primary Care Physician: Basim Guerrero MD  Expected Discharge Date:   Principal Problem:Encephalopathy, metabolic    Subjective:     Hospital Course:   76yoM admitted for treatment of encepalopathy with cardiology consulted due to elevated troponin and ECG findings of diffuse TWI (no STD/CORINNA) concerning for NSTEMI. ACS protocol intiated. DAPT started with ASA and plavix. Pt anticoagulated on heparin drip. High intensity stain continued. Lopressor 12.5mg BID, losartan 25mg qqd and diltiazem 30mg q8h commenced. Troponin values now decreasing after overnight trending (1.06->1.3->1.589->1.494). ECG still with inferolateral TWIs. Echo pending.      Interval History: Mr Muñoz was stable overnight with ACS protocol intiated. Pt appeared more responsive this morning, complaining of 8/10 epigastric and RUQ pain with rebound tenderness present. Troponin now decreasing. TTE pending.    Review of Systems   Constitutional: Positive for decreased appetite. Negative for diaphoresis, fever and malaise/fatigue.   HENT: Negative for congestion and sore throat.    Eyes: Negative for blurred vision, vision loss in left eye and vision loss in right eye.   Cardiovascular: Positive for chest pain and dyspnea on exertion. Negative for irregular heartbeat, leg swelling, near-syncope, palpitations and syncope.   Respiratory: Negative for cough, shortness of breath and wheezing.    Endocrine: Negative.    Hematologic/Lymphatic: Negative.    Skin: Negative.    Musculoskeletal: Negative for arthritis, back pain, joint pain and myalgias.   Gastrointestinal: Positive for abdominal pain. Negative for change in bowel habit, bowel incontinence, constipation, diarrhea, hematemesis, hematochezia and  melena.   Genitourinary: Negative.    Neurological: Positive for disturbances in coordination and focal weakness. Negative for light-headedness and loss of balance.   Psychiatric/Behavioral: Negative.      Objective:     Vital Signs (Most Recent):  Temp: 97.7 °F (36.5 °C) (01/26/22 0700)  Pulse: 85 (01/26/22 0911)  Resp: (!) 21 (01/26/22 0911)  BP: (!) 176/77 (01/26/22 0800)  SpO2: 99 % (01/26/22 0911) Vital Signs (24h Range):  Temp:  [96.4 °F (35.8 °C)-98.4 °F (36.9 °C)] 97.7 °F (36.5 °C)  Pulse:  [] 85  Resp:  [13-30] 21  SpO2:  [92 %-100 %] 99 %  BP: (105-176)/() 176/77     Weight: 95.3 kg (210 lb)  Body mass index is 27.71 kg/m².     SpO2: 99 %  O2 Device (Oxygen Therapy): room air      Intake/Output Summary (Last 24 hours) at 1/26/2022 0915  Last data filed at 1/26/2022 0700  Gross per 24 hour   Intake 4314.64 ml   Output 750 ml   Net 3564.64 ml       Lines/Drains/Airways     Drain                 NG/OG Tube 01/25/22 2315 14 Fr. Left nostril <1 day         NG/OG Tube 01/25/22 2315 Starr sump 14 Fr. Left nostril <1 day         Urethral Catheter 01/25/22 1623 Straight-tip 16 Fr. <1 day          Peripheral Intravenous Line                 Peripheral IV - Single Lumen 01/25/22 1551 18 G Left Antecubital <1 day         Peripheral IV - Single Lumen 01/25/22 1601 20 G Right Antecubital <1 day         Peripheral IV - Single Lumen 01/26/22 0148 22 G Left Hand <1 day                Physical Exam  Vitals and nursing note reviewed.   Constitutional:       Appearance: He is not toxic-appearing.   HENT:      Head: Normocephalic and atraumatic.   Eyes:      General: No scleral icterus.     Extraocular Movements: Extraocular movements intact.   Neck:      Vascular: No carotid bruit.   Cardiovascular:      Rate and Rhythm: Normal rate and regular rhythm.      Pulses: Normal pulses.      Heart sounds: Normal heart sounds. No murmur heard.  No gallop.    Pulmonary:      Effort: Pulmonary effort is normal. No  respiratory distress.      Breath sounds: Normal breath sounds. No wheezing or rales.   Abdominal:      General: Abdomen is flat. Bowel sounds are normal. There is no distension.      Palpations: Abdomen is soft. There is no mass.      Tenderness: There is abdominal tenderness. There is rebound.   Musculoskeletal:      Cervical back: Normal range of motion.      Right lower leg: No edema.      Left lower leg: No edema.   Skin:     General: Skin is warm and dry.      Capillary Refill: Capillary refill takes less than 2 seconds.      Findings: No rash.   Neurological:      Mental Status: He is alert.      Comments: Dysarthric, L facial droop, bilateral ptosis         Significant Labs:   CMP   Recent Labs   Lab 01/25/22  1213 01/25/22  1410 01/25/22  2340 01/26/22  0228 01/26/22  0329      < > 137 143 141   K 6.2*   < > 3.2* 3.8 4.0   CL 96   < > 109 111* 110   CO2 5*   < > 17* 20* 21*   *   < > 355* 237* 174*   BUN 40*   < > 32* 36* 36*   CREATININE 3.2*   < > 2.2* 2.6* 2.5*   CALCIUM 8.9   < > 7.2* 8.3* 8.5*   PROT 7.0  --   --   --  6.2   ALBUMIN 3.0*  --   --   --  2.7*   BILITOT 0.6  --   --   --  0.4   ALKPHOS 124  --   --   --  113   AST 14  --   --   --  20   ALT 15  --   --   --  14   ANIONGAP 35*   < > 11 12 10   ESTGFRAFRICA 20.3*   < > 32.0* 26.1* 27.4*   EGFRNONAA 17.6*   < > 27.7* 22.6* 23.7*    < > = values in this interval not displayed.   , CBC   Recent Labs   Lab 01/25/22  1213 01/25/22  1216 01/25/22  2137   WBC 15.48*  --   --    HGB 13.6*  --   --    HCT 45.5   < > 38     --   --     < > = values in this interval not displayed.    and All pertinent lab results from the last 24 hours have been reviewed.    Significant Imaging: Echocardiogram:   Transthoracic echo (TTE) complete (Cupid Only):   Results for orders placed or performed during the hospital encounter of 01/25/22   Echo   Result Value Ref Range    Ascending aorta 3.26 cm    STJ 3.17 cm    AV mean gradient 5 mmHg    Ao  peak marc 1.29 m/s    Ao VTI 27.40 cm    IVS 1.01 0.6 - 1.1 cm    LA size 4.30 cm    Left Atrium Major Axis 5.20 cm    Left Atrium Minor Axis 5.60 cm    LVIDd 5.73 3.5 - 6.0 cm    LVIDs 4.25 (A) 2.1 - 4.0 cm    LVOT diameter 2.17 cm    LVOT peak VTI 14.45 cm    Posterior Wall 0.91 0.6 - 1.1 cm    MV Peak A Marc 1.11 m/s    E wave deceleration time 205.32 msec    MV Peak E Marc 1.10 m/s    RA Major Axis 5.47 cm    RA Width 3.82 cm    RVDD 3.42 cm    Sinus 3.26 cm    TAPSE 2.20 cm    TDI LATERAL 0.13 m/s    TDI SEPTAL 0.08 m/s    LA WIDTH 4.50 cm    MV stenosis pressure 1/2 time 59.54 ms    LV Diastolic Volume 162.03 mL    LV Systolic Volume 80.67 mL    LVOT peak marc 0.76 m/s    LV LATERAL E/E' RATIO 8.46 m/s    LV SEPTAL E/E' RATIO 13.75 m/s    FS 26 %    LA volume 88.69 cm3    LV mass 216.56 g    Left Ventricle Relative Wall Thickness 0.32 cm    AV valve area 1.95 cm2    AV Velocity Ratio 0.59     AV index (prosthetic) 0.53     MV valve area p 1/2 method 3.69 cm2    E/A ratio 0.99     Mean e' 0.11 m/s    LVOT area 3.7 cm2    LVOT stroke volume 53.41 cm3    AV peak gradient 7 mmHg    E/E' ratio 10.48 m/s    BSA 2.21 m2    LV Systolic Volume Index 36.7 mL/m2    LV Diastolic Volume Index 73.65 mL/m2    LA Volume Index 40.3 mL/m2    LV Mass Index 98 g/m2     Assessment and Plan:       NSTEMI (non-ST elevated myocardial infarction)  His chest pain is atypical however similar to his pain he experienced with his STEMI.  He says he is compliant with his DAPT but unclear if so.  Is tolerating eliquis as well.  Had PCI to  of RCA on 1/9/2022 however LAD lesions not intervened on.  No new EKG changes.  Last EF was 55% on 1/13/2022.    - Discussed with ICU team and cardiology staff/interventional fellow  - Started pt on ACS protocol: heparin gtt, plavix 75mg qD, and ASA 81mg.  - Manage hypertension with home medications: lopressor, losartan, & diltiazem  - Discontinue tropinin and ECG trending   - F/U TTE results for WMAs  -  Nitro PRN for any chest discomfort      VTE Risk Mitigation (From admission, onward)         Ordered     heparin 25,000 units in dextrose 5% 250 mL (100 units/mL) infusion LOW INTENSITY nomogram - [facility]  Continuous        Question Answer Comment   Heparin Infusion Adjustment (DO NOT MODIFY ANSWER) [email or web address]\Images\[email or web address]\HeparinInfusions\heparin LOW INTENSITY nomogram for [facility] [id number]    Begin at (in units/kg/hr) 12        01/25/22 2247     Place sequential compression device  Until discontinued         01/25/22 2247     IP VTE HIGH RISK PATIENT  Once         01/25/22 1325              Thanks for the consult. We will continue to follow. Please contact Cardiology with any outstanding questions.    Sloan Mohan MD  Cardiology  [facility]

## 2022-01-26 NOTE — ASSESSMENT & PLAN NOTE
· [facility] on 1/9/22 with two vessel coronary artery disease with 100% occlusion of mid RCA and 70% stenosis of proximal LAD., RCA stent x2  - trop 0.143 today, no active chest pain, ecg with new LBBB and TWI in inferior leads, repeat trop/ecg pending  - trop was 12 twelve days ago  - trend trop/ecg  - holding home anticoagulation in setting of inability to tolerate PO as well as mild brain bleed noted on MRI

## 2022-01-27 PROBLEM — E10.9 KETOSIS-PRONE DIABETES MELLITUS: Status: ACTIVE | Noted: 2022-01-12

## 2022-01-27 LAB
ALBUMIN SERPL BCP-MCNC: 2.4 G/DL (ref 3.5–5.2)
ALBUMIN SERPL BCP-MCNC: 2.5 G/DL (ref 3.5–5.2)
ALP SERPL-CCNC: 113 U/L (ref 55–135)
ALP SERPL-CCNC: 120 U/L (ref 55–135)
ALT SERPL W/O P-5'-P-CCNC: 19 U/L (ref 10–44)
ALT SERPL W/O P-5'-P-CCNC: 22 U/L (ref 10–44)
ANION GAP SERPL CALC-SCNC: 14 MMOL/L (ref 8–16)
ANION GAP SERPL CALC-SCNC: 14 MMOL/L (ref 8–16)
APTT BLDCRRT: 44.4 SEC (ref 21–32)
AST SERPL-CCNC: 47 U/L (ref 10–40)
AST SERPL-CCNC: 49 U/L (ref 10–40)
BASOPHILS # BLD AUTO: 0.02 K/UL (ref 0–0.2)
BASOPHILS NFR BLD: 0.1 % (ref 0–1.9)
BILIRUB SERPL-MCNC: 0.5 MG/DL (ref 0.1–1)
BILIRUB SERPL-MCNC: 0.7 MG/DL (ref 0.1–1)
BUN SERPL-MCNC: 29 MG/DL (ref 8–23)
BUN SERPL-MCNC: 32 MG/DL (ref 8–23)
CALCIUM SERPL-MCNC: 8.1 MG/DL (ref 8.7–10.5)
CALCIUM SERPL-MCNC: 8.3 MG/DL (ref 8.7–10.5)
CHLORIDE SERPL-SCNC: 105 MMOL/L (ref 95–110)
CHLORIDE SERPL-SCNC: 107 MMOL/L (ref 95–110)
CO2 SERPL-SCNC: 23 MMOL/L (ref 23–29)
CO2 SERPL-SCNC: 24 MMOL/L (ref 23–29)
CREAT SERPL-MCNC: 1.7 MG/DL (ref 0.5–1.4)
CREAT SERPL-MCNC: 2.1 MG/DL (ref 0.5–1.4)
DIFFERENTIAL METHOD: ABNORMAL
EOSINOPHIL # BLD AUTO: 0 K/UL (ref 0–0.5)
EOSINOPHIL NFR BLD: 0.1 % (ref 0–8)
ERYTHROCYTE [DISTWIDTH] IN BLOOD BY AUTOMATED COUNT: 14.4 % (ref 11.5–14.5)
EST. GFR  (AFRICAN AMERICAN): 33.8 ML/MIN/1.73 M^2
EST. GFR  (AFRICAN AMERICAN): 43.7 ML/MIN/1.73 M^2
EST. GFR  (NON AFRICAN AMERICAN): 29.3 ML/MIN/1.73 M^2
EST. GFR  (NON AFRICAN AMERICAN): 37.8 ML/MIN/1.73 M^2
GLUCOSE SERPL-MCNC: 224 MG/DL (ref 70–110)
GLUCOSE SERPL-MCNC: 316 MG/DL (ref 70–110)
HCT VFR BLD AUTO: 40.1 % (ref 40–54)
HGB BLD-MCNC: 13.1 G/DL (ref 14–18)
IMM GRANULOCYTES # BLD AUTO: 0.07 K/UL (ref 0–0.04)
IMM GRANULOCYTES NFR BLD AUTO: 0.5 % (ref 0–0.5)
LYMPHOCYTES # BLD AUTO: 1 K/UL (ref 1–4.8)
LYMPHOCYTES NFR BLD: 6.7 % (ref 18–48)
MAGNESIUM SERPL-MCNC: 1.7 MG/DL (ref 1.6–2.6)
MAGNESIUM SERPL-MCNC: 1.8 MG/DL (ref 1.6–2.6)
MCH RBC QN AUTO: 28.2 PG (ref 27–31)
MCHC RBC AUTO-ENTMCNC: 32.7 G/DL (ref 32–36)
MCV RBC AUTO: 86 FL (ref 82–98)
MONOCYTES # BLD AUTO: 0.5 K/UL (ref 0.3–1)
MONOCYTES NFR BLD: 3.5 % (ref 4–15)
NEUTROPHILS # BLD AUTO: 13.3 K/UL (ref 1.8–7.7)
NEUTROPHILS NFR BLD: 89.1 % (ref 38–73)
NRBC BLD-RTO: 0 /100 WBC
PHOSPHATE SERPL-MCNC: 3 MG/DL (ref 2.7–4.5)
PLATELET # BLD AUTO: 316 K/UL (ref 150–450)
PMV BLD AUTO: 9.2 FL (ref 9.2–12.9)
POCT GLUCOSE: 236 MG/DL (ref 70–110)
POCT GLUCOSE: 238 MG/DL (ref 70–110)
POCT GLUCOSE: 250 MG/DL (ref 70–110)
POCT GLUCOSE: 296 MG/DL (ref 70–110)
POCT GLUCOSE: 304 MG/DL (ref 70–110)
POCT GLUCOSE: 306 MG/DL (ref 70–110)
POCT GLUCOSE: 314 MG/DL (ref 70–110)
POCT GLUCOSE: 315 MG/DL (ref 70–110)
POCT GLUCOSE: 318 MG/DL (ref 70–110)
POTASSIUM SERPL-SCNC: 3 MMOL/L (ref 3.5–5.1)
POTASSIUM SERPL-SCNC: 3.2 MMOL/L (ref 3.5–5.1)
POTASSIUM SERPL-SCNC: 3.4 MMOL/L (ref 3.5–5.1)
PROT SERPL-MCNC: 5.9 G/DL (ref 6–8.4)
PROT SERPL-MCNC: 6.4 G/DL (ref 6–8.4)
RBC # BLD AUTO: 4.65 M/UL (ref 4.6–6.2)
SODIUM SERPL-SCNC: 143 MMOL/L (ref 136–145)
SODIUM SERPL-SCNC: 144 MMOL/L (ref 136–145)
WBC # BLD AUTO: 14.87 K/UL (ref 3.9–12.7)

## 2022-01-27 PROCEDURE — 63600175 PHARM REV CODE 636 W HCPCS: Performed by: STUDENT IN AN ORGANIZED HEALTH CARE EDUCATION/TRAINING PROGRAM

## 2022-01-27 PROCEDURE — 93005 ELECTROCARDIOGRAM TRACING: CPT

## 2022-01-27 PROCEDURE — 25000003 PHARM REV CODE 250: Performed by: STUDENT IN AN ORGANIZED HEALTH CARE EDUCATION/TRAINING PROGRAM

## 2022-01-27 PROCEDURE — 63600175 PHARM REV CODE 636 W HCPCS: Performed by: PSYCHIATRY & NEUROLOGY

## 2022-01-27 PROCEDURE — 20000000 HC ICU ROOM

## 2022-01-27 PROCEDURE — 85730 THROMBOPLASTIN TIME PARTIAL: CPT | Performed by: PSYCHIATRY & NEUROLOGY

## 2022-01-27 PROCEDURE — 80053 COMPREHEN METABOLIC PANEL: CPT | Performed by: PSYCHIATRY & NEUROLOGY

## 2022-01-27 PROCEDURE — 83735 ASSAY OF MAGNESIUM: CPT | Performed by: STUDENT IN AN ORGANIZED HEALTH CARE EDUCATION/TRAINING PROGRAM

## 2022-01-27 PROCEDURE — 85025 COMPLETE CBC W/AUTO DIFF WBC: CPT | Performed by: NURSE PRACTITIONER

## 2022-01-27 PROCEDURE — 94761 N-INVAS EAR/PLS OXIMETRY MLT: CPT

## 2022-01-27 PROCEDURE — C9399 UNCLASSIFIED DRUGS OR BIOLOG: HCPCS

## 2022-01-27 PROCEDURE — 80053 COMPREHEN METABOLIC PANEL: CPT | Mod: 91 | Performed by: PSYCHIATRY & NEUROLOGY

## 2022-01-27 PROCEDURE — 84100 ASSAY OF PHOSPHORUS: CPT | Performed by: NURSE PRACTITIONER

## 2022-01-27 PROCEDURE — 25000003 PHARM REV CODE 250

## 2022-01-27 PROCEDURE — C9254 INJECTION, LACOSAMIDE: HCPCS | Performed by: PSYCHIATRY & NEUROLOGY

## 2022-01-27 PROCEDURE — 99900035 HC TECH TIME PER 15 MIN (STAT)

## 2022-01-27 PROCEDURE — 25000003 PHARM REV CODE 250: Performed by: PSYCHIATRY & NEUROLOGY

## 2022-01-27 PROCEDURE — 84132 ASSAY OF SERUM POTASSIUM: CPT | Performed by: PSYCHIATRY & NEUROLOGY

## 2022-01-27 PROCEDURE — C9113 INJ PANTOPRAZOLE SODIUM, VIA: HCPCS

## 2022-01-27 PROCEDURE — 95714 VEEG EA 12-26 HR UNMNTR: CPT

## 2022-01-27 PROCEDURE — 83735 ASSAY OF MAGNESIUM: CPT | Mod: 91 | Performed by: PSYCHIATRY & NEUROLOGY

## 2022-01-27 PROCEDURE — 63600175 PHARM REV CODE 636 W HCPCS

## 2022-01-27 RX ORDER — MAGNESIUM SULFATE HEPTAHYDRATE 40 MG/ML
2 INJECTION, SOLUTION INTRAVENOUS
Status: DISCONTINUED | OUTPATIENT
Start: 2022-01-27 | End: 2022-01-29

## 2022-01-27 RX ORDER — INSULIN ASPART 100 [IU]/ML
0-5 INJECTION, SOLUTION INTRAVENOUS; SUBCUTANEOUS
Status: DISCONTINUED | OUTPATIENT
Start: 2022-01-27 | End: 2022-01-30

## 2022-01-27 RX ORDER — INSULIN ASPART 100 [IU]/ML
2-4 INJECTION, SOLUTION INTRAVENOUS; SUBCUTANEOUS
Status: DISCONTINUED | OUTPATIENT
Start: 2022-01-28 | End: 2022-01-27

## 2022-01-27 RX ORDER — GLUCAGON 1 MG
1 KIT INJECTION
Status: DISCONTINUED | OUTPATIENT
Start: 2022-01-27 | End: 2022-01-29

## 2022-01-27 RX ORDER — POTASSIUM CHLORIDE 7.45 MG/ML
40 INJECTION INTRAVENOUS
Status: DISCONTINUED | OUTPATIENT
Start: 2022-01-27 | End: 2022-01-29

## 2022-01-27 RX ORDER — LANOLIN ALCOHOL/MO/W.PET/CERES
800 CREAM (GRAM) TOPICAL
Status: DISCONTINUED | OUTPATIENT
Start: 2022-01-27 | End: 2022-01-27

## 2022-01-27 RX ORDER — PANTOPRAZOLE SODIUM 40 MG/1
40 FOR SUSPENSION ORAL DAILY
Status: DISCONTINUED | OUTPATIENT
Start: 2022-01-28 | End: 2022-01-28

## 2022-01-27 RX ORDER — SODIUM,POTASSIUM PHOSPHATES 280-250MG
2 POWDER IN PACKET (EA) ORAL
Status: DISCONTINUED | OUTPATIENT
Start: 2022-01-27 | End: 2022-01-29

## 2022-01-27 RX ORDER — IBUPROFEN 200 MG
16 TABLET ORAL
Status: DISCONTINUED | OUTPATIENT
Start: 2022-01-27 | End: 2022-01-29

## 2022-01-27 RX ORDER — IBUPROFEN 200 MG
24 TABLET ORAL
Status: DISCONTINUED | OUTPATIENT
Start: 2022-01-27 | End: 2022-01-29

## 2022-01-27 RX ORDER — AMOXICILLIN 250 MG
1 CAPSULE ORAL DAILY
Status: DISCONTINUED | OUTPATIENT
Start: 2022-01-27 | End: 2022-02-02 | Stop reason: HOSPADM

## 2022-01-27 RX ORDER — MAGNESIUM SULFATE HEPTAHYDRATE 40 MG/ML
4 INJECTION, SOLUTION INTRAVENOUS
Status: DISCONTINUED | OUTPATIENT
Start: 2022-01-27 | End: 2022-01-29

## 2022-01-27 RX ORDER — INSULIN ASPART 100 [IU]/ML
2-4 INJECTION, SOLUTION INTRAVENOUS; SUBCUTANEOUS
Status: DISCONTINUED | OUTPATIENT
Start: 2022-01-27 | End: 2022-01-29

## 2022-01-27 RX ORDER — ONDANSETRON 2 MG/ML
8 INJECTION INTRAMUSCULAR; INTRAVENOUS EVERY 8 HOURS PRN
Status: DISCONTINUED | OUTPATIENT
Start: 2022-01-27 | End: 2022-01-28

## 2022-01-27 RX ORDER — DEXTROSE MONOHYDRATE AND SODIUM CHLORIDE 5; .9 G/100ML; G/100ML
INJECTION, SOLUTION INTRAVENOUS CONTINUOUS
Status: DISCONTINUED | OUTPATIENT
Start: 2022-01-27 | End: 2022-01-27

## 2022-01-27 RX ADMIN — METOPROLOL TARTRATE 12.5 MG: 25 TABLET, FILM COATED ORAL at 09:01

## 2022-01-27 RX ADMIN — PIPERACILLIN SODIUM AND TAZOBACTAM SODIUM 4.5 G: 4; .5 INJECTION, POWDER, FOR SOLUTION INTRAVENOUS at 08:01

## 2022-01-27 RX ADMIN — HYDRALAZINE HYDROCHLORIDE 10 MG: 20 INJECTION INTRAMUSCULAR; INTRAVENOUS at 03:01

## 2022-01-27 RX ADMIN — INSULIN ASPART 6 UNITS: 100 INJECTION, SOLUTION INTRAVENOUS; SUBCUTANEOUS at 02:01

## 2022-01-27 RX ADMIN — POTASSIUM CHLORIDE 40 MEQ: 7.46 INJECTION, SOLUTION INTRAVENOUS at 07:01

## 2022-01-27 RX ADMIN — DILTIAZEM HYDROCHLORIDE 30 MG: 30 TABLET, FILM COATED ORAL at 06:01

## 2022-01-27 RX ADMIN — HEPARIN SODIUM 15 UNITS/KG/HR: 1000 INJECTION INTRAVENOUS; SUBCUTANEOUS at 07:01

## 2022-01-27 RX ADMIN — INSULIN ASPART 8 UNITS: 100 INJECTION, SOLUTION INTRAVENOUS; SUBCUTANEOUS at 06:01

## 2022-01-27 RX ADMIN — INSULIN ASPART 6 UNITS: 100 INJECTION, SOLUTION INTRAVENOUS; SUBCUTANEOUS at 09:01

## 2022-01-27 RX ADMIN — METOPROLOL TARTRATE 12.5 MG: 25 TABLET, FILM COATED ORAL at 08:01

## 2022-01-27 RX ADMIN — LABETALOL HYDROCHLORIDE 10 MG: 5 INJECTION, SOLUTION INTRAVENOUS at 06:01

## 2022-01-27 RX ADMIN — SODIUM CHLORIDE 100 MG: 9 INJECTION, SOLUTION INTRAVENOUS at 09:01

## 2022-01-27 RX ADMIN — ONDANSETRON 4 MG: 2 INJECTION INTRAMUSCULAR; INTRAVENOUS at 07:01

## 2022-01-27 RX ADMIN — HEPARIN SODIUM 15 UNITS/KG/HR: 1000 INJECTION INTRAVENOUS; SUBCUTANEOUS at 12:01

## 2022-01-27 RX ADMIN — PANTOPRAZOLE SODIUM 40 MG: 40 INJECTION, POWDER, FOR SOLUTION INTRAVENOUS at 09:01

## 2022-01-27 RX ADMIN — DILTIAZEM HYDROCHLORIDE 30 MG: 30 TABLET, FILM COATED ORAL at 11:01

## 2022-01-27 RX ADMIN — POTASSIUM BICARBONATE 35 MEQ: 391 TABLET, EFFERVESCENT ORAL at 09:01

## 2022-01-27 RX ADMIN — INSULIN ASPART 8 UNITS: 100 INJECTION, SOLUTION INTRAVENOUS; SUBCUTANEOUS at 02:01

## 2022-01-27 RX ADMIN — INSULIN DETEMIR 8 UNITS: 100 INJECTION, SOLUTION SUBCUTANEOUS at 09:01

## 2022-01-27 RX ADMIN — PROMETHAZINE HYDROCHLORIDE 12.5 MG: 25 INJECTION INTRAMUSCULAR; INTRAVENOUS at 07:01

## 2022-01-27 RX ADMIN — INSULIN ASPART 2 UNITS: 100 INJECTION, SOLUTION INTRAVENOUS; SUBCUTANEOUS at 06:01

## 2022-01-27 RX ADMIN — MAGNESIUM SULFATE 2 G: 2 INJECTION INTRAVENOUS at 09:01

## 2022-01-27 RX ADMIN — ASPIRIN 81 MG CHEWABLE TABLET 81 MG: 81 TABLET CHEWABLE at 09:01

## 2022-01-27 RX ADMIN — ONDANSETRON 8 MG: 2 INJECTION INTRAMUSCULAR; INTRAVENOUS at 02:01

## 2022-01-27 RX ADMIN — INSULIN ASPART 3 UNITS: 100 INJECTION, SOLUTION INTRAVENOUS; SUBCUTANEOUS at 02:01

## 2022-01-27 RX ADMIN — CLOPIDOGREL 75 MG: 75 TABLET, FILM COATED ORAL at 09:01

## 2022-01-27 RX ADMIN — DEXTROSE AND SODIUM CHLORIDE: 5; .9 INJECTION, SOLUTION INTRAVENOUS at 11:01

## 2022-01-27 RX ADMIN — ATORVASTATIN CALCIUM 80 MG: 40 TABLET, FILM COATED ORAL at 09:01

## 2022-01-27 RX ADMIN — PIPERACILLIN SODIUM AND TAZOBACTAM SODIUM 4.5 G: 4; .5 INJECTION, POWDER, FOR SOLUTION INTRAVENOUS at 04:01

## 2022-01-27 RX ADMIN — LOSARTAN POTASSIUM 25 MG: 25 TABLET, FILM COATED ORAL at 09:01

## 2022-01-27 RX ADMIN — PIPERACILLIN SODIUM AND TAZOBACTAM SODIUM 4.5 G: 4; .5 INJECTION, POWDER, FOR SOLUTION INTRAVENOUS at 11:01

## 2022-01-27 RX ADMIN — INSULIN ASPART 2 UNITS: 100 INJECTION, SOLUTION INTRAVENOUS; SUBCUTANEOUS at 09:01

## 2022-01-27 RX ADMIN — INSULIN ASPART 2 UNITS: 100 INJECTION, SOLUTION INTRAVENOUS; SUBCUTANEOUS at 04:01

## 2022-01-27 RX ADMIN — INSULIN ASPART 6 UNITS: 100 INJECTION, SOLUTION INTRAVENOUS; SUBCUTANEOUS at 06:01

## 2022-01-27 RX ADMIN — AMIODARONE HYDROCHLORIDE 200 MG: 200 TABLET ORAL at 09:01

## 2022-01-27 RX ADMIN — POTASSIUM BICARBONATE 35 MEQ: 391 TABLET, EFFERVESCENT ORAL at 11:01

## 2022-01-27 RX ADMIN — INSULIN ASPART 4 UNITS: 100 INJECTION, SOLUTION INTRAVENOUS; SUBCUTANEOUS at 09:01

## 2022-01-27 NOTE — PROGRESS NOTES
[facility]  Cardiology  Progress Note    Patient Name: Kamar Muñoz  MRN: [id number]  Admission Date: 1/25/2022  Hospital Length of Stay: 2 days  Code Status: Full Code   Attending Physician: Thom Carson MD   Primary Care Physician: Basim Guerrero MD  Expected Discharge Date: 2/1/2022  Principal Problem:Encephalopathy, metabolic    Subjective:     Hospital Course:   76yoM admitted for treatment of encepalopathy with cardiology consulted due to elevated troponin and ECG findings of diffuse TWI (no STD/CORINNA) concerning for NSTEMI. ACS protocol intiated. DAPT started with ASA and plavix. Pt anticoagulated on heparin drip. High intensity stain continued. Lopressor 12.5mg BID, losartan 25mg qqd and diltiazem 30mg q8h commenced. Troponin values now decreasing after overnight trending (1.06->1.3->1.589->1.494). ECG still with inferolateral TWIs. Echo showing EF 50% with abnormal septal and LV WMA. Not currently a good inpatient [facility] candidate due to decreased renal function of CKD-IIIa with Cr 1.7 and GFR 37.8 (previously GFR > 60 on 12/29/21).       Interval History: Pt HDS with no acute events overnight. Oriented x4 this morning with no complaints of chest pain. TTE showing septal and LV WMA. ACS protocol continued. Currently Cr 1.7 with GFR 37.8. Previously Cr 1.1 and GFR >60 on 12/29/21.     Review of Systems   Constitutional: Positive for decreased appetite. Negative for diaphoresis, fever and malaise/fatigue.   HENT: Negative for congestion and sore throat.    Eyes: Negative for blurred vision, vision loss in left eye and vision loss in right eye.   Cardiovascular: Negative for chest pain, dyspnea on exertion, irregular heartbeat, leg swelling, near-syncope, palpitations and syncope.   Respiratory: Negative for cough, shortness of breath and wheezing.    Endocrine: Negative.    Hematologic/Lymphatic: Negative.    Skin: Negative.    Musculoskeletal: Negative for arthritis, back pain,  joint pain and myalgias.   Gastrointestinal: Negative for abdominal pain, change in bowel habit, bowel incontinence, constipation, diarrhea, hematemesis, hematochezia and melena.   Genitourinary: Negative.    Neurological: Positive for disturbances in coordination and focal weakness. Negative for light-headedness and loss of balance.   Psychiatric/Behavioral: Negative.      Objective:     Vital Signs (Most Recent):  Temp: 98.6 °F (37 °C) (01/27/22 0705)  Pulse: 72 (01/27/22 0900)  Resp: 20 (01/27/22 0900)  BP: (!) 117/56 (01/27/22 0900)  SpO2: (!) 92 % (01/27/22 0900) Vital Signs (24h Range):  Temp:  [97.7 °F (36.5 °C)-98.6 °F (37 °C)] 98.6 °F (37 °C)  Pulse:  [68-82] 72  Resp:  [16-25] 20  SpO2:  [84 %-100 %] 92 %  BP: (117-180)/() 117/56     Weight: 95.2 kg (209 lb 14.1 oz)  Body mass index is 26.95 kg/m².     SpO2: (!) 92 %  O2 Device (Oxygen Therapy): room air      Intake/Output Summary (Last 24 hours) at 1/27/2022 0935  Last data filed at 1/27/2022 0700  Gross per 24 hour   Intake 1918.5 ml   Output 3858 ml   Net -1939.5 ml       Lines/Drains/Airways     Drain                 Urethral Catheter 01/25/22 1623 Straight-tip 16 Fr. 1 day          Peripheral Intravenous Line                 Peripheral IV - Single Lumen 01/25/22 1601 20 G Right Antecubital 1 day         Peripheral IV - Single Lumen 01/26/22 0148 22 G Left Hand 1 day         Peripheral IV - Single Lumen 01/26/22 1325 20 G Anterior;Left;Proximal Forearm <1 day                Physical Exam  Vitals and nursing note reviewed.   Constitutional:       Appearance: He is not toxic-appearing.   HENT:      Head: Normocephalic and atraumatic.   Eyes:      General: No scleral icterus.     Extraocular Movements: Extraocular movements intact.   Neck:      Vascular: No carotid bruit.   Cardiovascular:      Rate and Rhythm: Normal rate and regular rhythm.      Pulses: Normal pulses.      Heart sounds: Normal heart sounds. No murmur heard.  No gallop.     Pulmonary:      Effort: Pulmonary effort is normal. No respiratory distress.      Breath sounds: Normal breath sounds. No wheezing or rales.   Abdominal:      General: Abdomen is flat. Bowel sounds are normal. There is no distension.      Palpations: Abdomen is soft. There is no mass.      Tenderness: There is no abdominal tenderness. There is no rebound.   Musculoskeletal:      Cervical back: Normal range of motion.      Right lower leg: No edema.      Left lower leg: No edema.   Skin:     General: Skin is warm and dry.      Capillary Refill: Capillary refill takes less than 2 seconds.      Findings: No rash.   Neurological:      Mental Status: He is alert.         Significant Labs:   CMP   Recent Labs   Lab 01/26/22  1255 01/27/22  0019 01/27/22  0627    144 143   K 4.2 3.0* 3.4*   * 107 105   CO2 24 23 24   * 224* 316*   BUN 40* 32* 29*   CREATININE 2.1* 2.1* 1.7*   CALCIUM 7.9* 8.1* 8.3*   PROT 5.7* 5.9* 6.4   ALBUMIN 2.4* 2.4* 2.5*   BILITOT 0.5 0.5 0.7   ALKPHOS 107 113 120   AST 41* 47* 49*   ALT 18 19 22   ANIONGAP 9 14 14   ESTGFRAFRICA 33.8* 33.8* 43.7*   EGFRNONAA 29.3* 29.3* 37.8*   , CBC   Recent Labs   Lab 01/27/22  0349   WBC 14.87*   HGB 13.1*   HCT 40.1       and All pertinent lab results from the last 24 hours have been reviewed.    Significant Imaging: Echocardiogram:   Transthoracic echo (TTE) complete (Cupid Only):   Results for orders placed or performed during the hospital encounter of 01/25/22   Echo   Result Value Ref Range    Ascending aorta 3.26 cm    STJ 3.17 cm    AV mean gradient 5 mmHg    Ao peak marc 1.29 m/s    Ao VTI 27.40 cm    IVS 1.01 0.6 - 1.1 cm    LA size 4.30 cm    Left Atrium Major Axis 5.20 cm    Left Atrium Minor Axis 5.60 cm    LVIDd 5.73 3.5 - 6.0 cm    LVIDs 4.25 (A) 2.1 - 4.0 cm    LVOT diameter 2.17 cm    LVOT peak VTI 14.45 cm    Posterior Wall 0.91 0.6 - 1.1 cm    MV Peak A Marc 1.11 m/s    E wave deceleration time 205.32 msec    MV Peak E  Marc 1.10 m/s    RA Major Axis 5.47 cm    RA Width 3.82 cm    RVDD 3.42 cm    Sinus 3.26 cm    TAPSE 2.20 cm    TDI LATERAL 0.13 m/s    TDI SEPTAL 0.08 m/s    LA WIDTH 4.50 cm    MV stenosis pressure 1/2 time 59.54 ms    LV Diastolic Volume 162.03 mL    LV Systolic Volume 80.67 mL    LVOT peak marc 0.76 m/s    LV LATERAL E/E' RATIO 8.46 m/s    LV SEPTAL E/E' RATIO 13.75 m/s    FS 26 %    LA volume 88.69 cm3    LV mass 216.56 g    Left Ventricle Relative Wall Thickness 0.32 cm    AV valve area 1.95 cm2    AV Velocity Ratio 0.59     AV index (prosthetic) 0.53     MV valve area p 1/2 method 3.69 cm2    E/A ratio 0.99     Mean e' 0.11 m/s    LVOT area 3.7 cm2    LVOT stroke volume 53.41 cm3    AV peak gradient 7 mmHg    E/E' ratio 10.48 m/s    BSA 2.21 m2    LV Systolic Volume Index 36.7 mL/m2    LV Diastolic Volume Index 73.65 mL/m2    LA Volume Index 40.3 mL/m2    LV Mass Index 98 g/m2    Right Atrial Pressure (from IVC) 15 mmHg    EF 50 %    Narrative    · There is abnormal septal wall motion.  · The left ventricle is normal in size with low normal systolic function.  · The estimated ejection fraction is 50%.  · Normal left ventricular diastolic function.  · Mild left atrial enlargement.  · Normal right ventricular size with normal right ventricular systolic   function.  · Mild mitral regurgitation.  · There are segmental left ventricular wall motion abnormalities.  · Mild tricuspid regurgitation.  · Elevated central venous pressure (15 mmHg).        Assessment and Plan:       NSTEMI (non-ST elevated myocardial infarction)  His chest pain is atypical however similar to his pain he experienced with his STEMI.  He says he is compliant with his DAPT but unclear if so.  Is tolerating eliquis as well.  Had PCI to  of RCA on 1/9/2022 however LAD lesions not intervened on. No new EKG changes. Troponin now decreasing. Last EF was 55% on 1/13/2022.    - Discussed with ICU team and cardiology staff/interventional fellow  -  Manage hypertension with home medications: lopressor, losartan, & diltiazem  - Continue Amiodarone 200mg qd for Afib   - Discontinue tropinin and ECG trending   - F/U TTE results: EF 50% and septal and LV WMAs  - GFR has declined over past month from >60 to 37.8. Optimize renal function prior to further intervention  - Discontinue Heparin gtt at 0100 on 1/28/22. Start AC with apixiban 20mg qd for nonvalvular Afib  - Continue DAPT with ASA 81mg qd and plavix 75mg qd   - Nitro PRN for any chest discomfort  - Avoid nephrotoxic medications  - F/U with Cardiology outpatient for future PET stress echo.         VTE Risk Mitigation (From admission, onward)         Ordered     Place sequential compression device  Until discontinued         01/27/22 0839     heparin 25,000 units in dextrose 5% 250 mL (100 units/mL) infusion LOW INTENSITY nomogram - [facility]  Continuous        Question Answer Comment   Heparin Infusion Adjustment (DO NOT MODIFY ANSWER) [email or web address] LOW INTENSITY nomogram for [facility] [email or web address]    Begin at (in units/kg/hr) 12        01/25/22 2247     IP VTE HIGH RISK PATIENT  Once         01/25/22 1325              Thanks for the consult; I will be signing off. Please contact cardiology with any outstanding questions.    Sloan Mohan MD  Cardiology  [facility]

## 2022-01-27 NOTE — ASSESSMENT & PLAN NOTE
likely related to DKA, critical illness may be causing re expression of stroke  - EEG  - Concern for infectious process triggering AMS/DKA  - mental status much improved, has been seizure free for 24 hours. Continue Vimpat per epilepsy.   - ready for step down to floor

## 2022-01-27 NOTE — PLAN OF CARE
[facility] Care Plan    POC reviewed with Kamar Muñoz  at 0300. Pt verbalized understanding. Questions and concerns addressed. No acute events overnight. Neuro status unchanged, gave labetalol  10mg, twice, and hydralazine once to maintain SBP <140. Gave bath and changed linens. Pt progressing toward goals. Will continue to monitor. See below and flowsheets for full assessment and VS info.       Neuro:  [place] Coma Scale  Best Eye Response: 4-->(E4) spontaneous  Best Motor Response: 6-->(M6) obeys commands  Best Verbal Response: 5-->(V5) oriented  [place] Coma Scale Score: 15        24hr Temp:  [97.7 °F (36.5 °C)-98 °F (36.7 °C)]     CV:   Rhythm: normal sinus rhythm  BP goals:   SBP < 140  MAP > 65    Resp:   O2 Device (Oxygen Therapy): room air       GI/:     Diet/Nutrition Received: NPO  Last Bowel Movement: 01/25/22  Voiding Characteristics: external catheter    Intake/Output Summary (Last 24 hours) at 1/27/2022 0708  Last data filed at 1/27/2022 0605  Gross per 24 hour   Intake 1821.49 ml   Output 3918 ml   Net -2096.51 ml          Labs/Accuchecks:  Recent Labs   Lab 01/27/22  0349   WBC 14.87*   RBC 4.65   HGB 13.1*   HCT 40.1         Recent Labs   Lab 01/27/22  0019      K 3.0*   CO2 23      BUN 32*   CREATININE 2.1*   ALKPHOS 113   ALT 19   AST 47*   BILITOT 0.5      Recent Labs   Lab 01/25/22  2301 01/26/22  0628 01/26/22  2124   INR 1.0  --   --    APTT 21.2   < > 57.2*    < > = values in this interval not displayed.      Recent Labs   Lab 01/26/22  1512   TROPONINI 0.820*       Electrolytes: Contraindicated  Accuchecks: Q4H    Gtts:   dextrose 5 % and 0.9 % NaCl 50 mL/hr at 01/27/22 0605    heparin (porcine) in D5W 15 Units/kg/hr (01/27/22 0605)       LDA/Wounds:  Lines/Drains/Airways       Drain                   Urethral Catheter 01/25/22 1623 Straight-tip 16 Fr. 1 day              Peripheral Intravenous Line                   Peripheral IV - Single Lumen 01/25/22 1601 20 G  Right Antecubital 1 day         Peripheral IV - Single Lumen 01/26/22 0148 22 G Left Hand 1 day         Peripheral IV - Single Lumen 01/26/22 1325 20 G Anterior;Left;Proximal Forearm <1 day                  Wounds: Yes  Wound care consulted: Yes

## 2022-01-27 NOTE — ASSESSMENT & PLAN NOTE
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 Stroke code called. MRI Brain revealed moderate-sized subacute R frontal and L inferior cerebellar infarcts with associated petechial hemorrhage, overlying sulcal FLAIR hyperintensity likely reflecting some associated localized subarachnoid blood products, no new hemorrhage or infarct; MRA grossly stable from prior, no new high-grade stenosis or large vessel occlusion. Patient admitted to MICU for further management of encephalopathy, DKA, and BRENDAN. Patient developed chest pain overnight and started on ACS protocol for NSTEMI after evaluation by cardiology. Patient transferred to [facility] 1/26/2022 for close neurologic monitoring in setting of heparin drip and cerebral bleed. EEG revealed multiple subclinical seizures with R hemispheric onset. Epilepsy following for assistance in management.     Recommendations:  - Discontinue vEEG  - Recommend to continue Lacosamide 100 mg BID on discharge  - Cefepime discontinued 1/26  - Avoid agents that lower seizure threshold  - Management of infectious/metabolic abnormalities per NCC  - Seizure precautions  - Will arrange outpatient follow up in Epilepsy clinic      Discussed plan of care with patient and NCC team. No family at bedside. Will sign off, please call with questions.

## 2022-01-27 NOTE — CONSULTS
"[facility]  Endocrinology  Consult Note    Consult Requested by: Thom Carson MD   Reason for admit: Encephalopathy, metabolic    HISTORY OF PRESENT ILLNESS:  Mr. Kamar Muñoz is a 76-year-old-man with a history of CAD, T2DM, HLD, HTN, recent RMCA stroke with LSW, admitted for AMS found to have hemorraghic conversion of recent RMCA infarct and DKA; hospital course c/b R focal seizures and NSTEMI. Endocrinology consulted for "after DKA."     Upon arrival . pH 7.216. Bicarb 7. AG 29. BHB 4.8. UA 1+ ketones. Placed on insulin drip on admission, discontinued 1/26 after bicarb >18 and AG closed. Now on SQ aspart 6 U q4 hrs + MDSSI as well as D51/2 NS. Not tolerating diet well due to ongoing nausea, receiving prn zofran. Recent -->316-->250.   On vimpat for focal seizures. On heparin drip for NSTEMI, no chest pain upon evaluation.      Regarding Diabetes Mellitus:  - Initially diagnosed with Type 2 diabetes mellitus before 2004.   - A1c 11.5% on admission   - Current symptoms: nausea   - Denies:  chest pain, Polyuria/polydipsia, abdominal discomfort, numbness/tingling, visual changes, or subjective weight changes  Wt Readings from Last 5 Encounters:   01/26/22 95.2 kg (209 lb 14.1 oz)   01/19/22 89.7 kg (197 lb 12 oz)   01/14/22 90 kg (198 lb 6.6 oz)   01/10/22 90.3 kg (199 lb)   01/05/22 92.1 kg (203 lb 0.7 oz)     - Pertinent factors: admitted with DKA, managed by [facility].   - Denies: history of pancreatitis, recurrent gallstones, daily alcohol use, MEN syndrome, pancreatic tumors, or gastroparesis  - Known diabetic complications: cerebrovascular disease, neuropathy, CAD, UTIs  - Cardiovascular risk factors: advanced age (older than 55 for men, 65 for women), diabetes mellitus, dyslipidemia, hypertension, and male gender    - Current diabetic medications include:   1. Lantus 17 U qhs  2. Aspart 17 U TID with meals   3. Metformin 500 mg 2 times a day     - Patient now on or " "previously been on a GLP-1 agonist: no  - Current diet: in general, a "healthy" diet    - Current glucose monitoring regimen:  1 x/day  - Any episodes of hypoglycemia? no  - Family Hx:  Denies FH of diabetes or thyroid disorder       Diabetes Management Status  - Statin: atorvastatin 80 mg qd  - ACE/ARB: losartan 25 mg qd  - Seen Optometry/Ophthalmology within last 12 months: no    A complete 14 point review of systems was conducted and negative except for what is stated above.       Medications and/or Treatments Impacting Glycemic Control:  Immunotherapy:    Immunosuppressants     None        Steroids:   Hormones (From admission, onward)            None        Pressors:    Autonomic Drugs (From admission, onward)            Start     Stop Route Frequency Ordered    01/26/22 1100  metoprolol tartrate (LOPRESSOR) split tablet 12.5 mg         -- PER NG TUBE 2 times daily 01/26/22 1011          Medications Prior to Admission   Medication Sig Dispense Refill Last Dose    amiodarone (PACERONE) 200 MG Tab Take 2 tablet (400mg) by mouth twice a day for 7 days then 2 tablets (400mg) daily x 7 days then 1 tablet 200mg daily thereafter (Patient taking differently: Take 2 tablet (400mg) by mouth twice a day for 7 days then 2 tablets (400mg) daily x 7 days then 1 tablet 200mg daily thereafter) 90 tablet 3 1/26/2022 at Unknown time    apixaban (ELIQUIS) 5 mg Tab Take 1 tablet (5 mg total) by mouth 2 (two) times daily. 60 tablet 5 1/26/2022 at Unknown time    aspirin (ECOTRIN) 81 MG EC tablet Take 81 mg by mouth once daily.   1/26/2022 at Unknown time    atorvastatin (LIPITOR) 40 MG tablet Take 1 tablet (40 mg total) by mouth once daily. 90 tablet 3 1/26/2022 at Unknown time    BD ULTRA-FINE SHORT PEN NEEDLE 31 gauge x 5/16" Ndle 3 (three) times daily.   1/26/2022 at Unknown time    blood sugar diagnostic Strp 1 strip by Misc.(Non-Drug; Combo Route) route 2 (two) times a day. 200 strip 6 1/26/2022 at Unknown time    " clopidogreL (PLAVIX) 75 mg tablet Take 1 tablet (75 mg total) by mouth once daily. 30 tablet 11 1/26/2022 at Unknown time    diclofenac sodium (VOLTAREN) 1 % Gel APPLY 2 GRAMS TO AFFECTED AREA ONCE D   1/26/2022 at Unknown time    diltiaZEM (CARDIZEM CD) 120 MG Cp24 Take 1 capsule (120 mg total) by mouth once daily. 90 capsule 3 1/26/2022 at Unknown time    ergocalciferol (ERGOCALCIFEROL) 50,000 unit Cap    1/26/2022 at Unknown time    gabapentin (NEURONTIN) 300 MG capsule Take 1 capsule (300 mg total) by mouth 2 (two) times daily. (Patient taking differently: Take 300 mg by mouth 2 (two) times daily. Takes nightly) 180 capsule 3 1/26/2022 at Unknown time    hydrocortisone 2.5 % cream APPLY TO GROIN RASH BID NO LONGER THAN 7 DAYS   1/26/2022 at Unknown time    insulin aspart U-100 (NOVOLOG FLEXPEN U-100 INSULIN) 100 unit/mL (3 mL) InPn pen Inject 17 Units into the skin 3 (three) times daily with meals. 45.9 mL 3 1/26/2022 at Unknown time    insulin glargine (LANTUS) 100 unit/mL injection Inject 28 Units into the skin every evening. 25.2 mL 3 1/26/2022 at Unknown time    ketoconazole (NIZORAL) 2 % cream APPLY TO GROIN RASH BID NO LONGER THAN 7 DAYS   1/26/2022 at Unknown time    lancets (ONETOUCH ULTRASOFT LANCETS) Misc 1 lancet by Misc.(Non-Drug; Combo Route) route 2 (two) times a day. 200 each 6 1/26/2022 at Unknown time    losartan (COZAAR) 25 MG tablet Take 1 tablet (25 mg total) by mouth once daily. 90 tablet 3 1/26/2022 at Unknown time    metFORMIN (GLUCOPHAGE) 500 MG tablet Take 1 tablet (500 mg total) by mouth 2 (two) times daily with meals. 180 tablet 3 1/26/2022 at Unknown time    metoprolol succinate (TOPROL-XL) 25 MG 24 hr tablet Take 1 tablet (25 mg total) by mouth once daily. 30 tablet 11 1/26/2022 at Unknown time    MULTIVITAMIN ORAL Take 1 tablet by mouth once daily.    1/26/2022 at Unknown time    nitroGLYCERIN (NITROSTAT) 0.4 MG SL tablet Place 1 tablet (0.4 mg total) under the tongue  "every 5 (five) minutes as needed for Chest pain. 25 tablet 11 1/26/2022 at Unknown time    pantoprazole (PROTONIX) 40 MG tablet Take 1 tablet (40 mg total) by mouth once daily. 30 tablet 11 1/26/2022 at Unknown time    pen needle, diabetic (PEN NEEDLE) 30 gauge x 5/16" Ndle 1 Units by Misc.(Non-Drug; Combo Route) route 3 (three) times daily. 100 each 3 1/26/2022 at Unknown time    polycarbophil (FIBERCON) 625 mg tablet Take 1 tablet by mouth once daily.    1/26/2022 at Unknown time    glucagon, human recombinant, (GLUCAGON EMERGENCY KIT, HUMAN,) 1 mg SolR Inject 1 mg into the muscle as needed. 1 each 0        Current Facility-Administered Medications   Medication Dose Route Frequency Provider Last Rate Last Admin    amiodarone tablet 200 mg  200 mg Per NG tube Daily Thom Carson MD   200 mg at 01/27/22 0916    aspirin chewable tablet 81 mg  81 mg Per NG tube Daily Danielle Batres MD   81 mg at 01/27/22 0915    atorvastatin tablet 80 mg  80 mg Per NG tube Daily Danielle Batres MD   80 mg at 01/27/22 0915    clopidogreL tablet 75 mg  75 mg Per NG tube Daily Danielle Batres MD   75 mg at 01/27/22 0915    dextrose 5 % and 0.9 % NaCl infusion   Intravenous Continuous Thom Carson MD 50 mL/hr at 01/27/22 1400 Rate Verify at 01/27/22 1400    dextrose 50% injection 12.5 g  12.5 g Intravenous PRN Aly Mclean, DO        dextrose 50% injection 25 g  25 g Intravenous PRN Aly Mclean, DO        diltiaZEM tablet 30 mg  30 mg Per NG tube Q6H Thom Carson MD   30 mg at 01/27/22 1145    heparin 25,000 units in dextrose 5% 250 mL (100 units/mL) infusion LOW INTENSITY nomogram - [facility]  0-40 Units/kg/hr (Adjusted) Intravenous Continuous Danielle Batres MD 12.9 mL/hr at 01/27/22 1400 15 Units/kg/hr at 01/27/22 1400    hydrALAZINE injection 10 mg  10 mg Intravenous Q6H PRN Thom Carson MD   10 mg at 01/27/22 0354    insulin aspart U-100 pen 1-10 Units  1-10 Units Subcutaneous Q4H PRN " Thom Carson MD   8 Units at 01/27/22 1414    insulin aspart U-100 pen 6 Units  6 Units Subcutaneous Q4H Thom Carson MD   6 Units at 01/27/22 1414    labetalol 20 mg/4 mL (5 mg/mL) IV syring  10 mg Intravenous Q6H PRN Thom Carson MD   10 mg at 01/27/22 0636    lacosamide (VIMPAT) 100 mg in sodium chloride 0.9% 100 mL IVPB  100 mg Intravenous Q12H Thom Carson MD   Stopped at 01/27/22 0947    losartan tablet 25 mg  25 mg Per NG tube Daily Thom Carson MD   25 mg at 01/27/22 0915    magnesium oxide tablet 800 mg  800 mg Oral PRN Loree Ramírez MD        magnesium oxide tablet 800 mg  800 mg Oral PRN Loree Ramírez MD        metoprolol tartrate (LOPRESSOR) split tablet 12.5 mg  12.5 mg Per NG tube BID Thom Carson MD   12.5 mg at 01/27/22 0915    nitroGLYCERIN SL tablet 0.4 mg  0.4 mg Sublingual Q5 Min PRN Danielle Batres MD        ondansetron injection 8 mg  8 mg Intravenous Q8H PRN Thom Carson MD   8 mg at 01/27/22 1420    [START ON 1/28/2022] pantoprazole suspension 40 mg  40 mg Per NG tube Daily Thom Carson MD        phenylephrine HCL 0.25% nasal spray 1 spray  1 spray Each Nostril Q6H PRN Thom Carson MD        piperacillin-tazobactam 4.5 g in sodium chloride 0.9% 100 mL IVPB (ready to mix system)  4.5 g Intravenous Q8H Thom Carson MD 25 mL/hr at 01/27/22 1400 Rate Verify at 01/27/22 1400    potassium bicarbonate disintegrating tablet 35 mEq  35 mEq Oral PRN Loree Ramírez MD   35 mEq at 01/27/22 1140    potassium bicarbonate disintegrating tablet 50 mEq  50 mEq Oral PRN Loree Ramírez MD        potassium bicarbonate disintegrating tablet 60 mEq  60 mEq Oral PRN Loree Ramírez MD        potassium, sodium phosphates 280-160-250 mg packet 2 packet  2 packet Oral PRN Loree Ramírez MD        potassium, sodium phosphates 280-160-250 mg packet 2 packet  2 packet Oral PRN Loree Ramírez MD        potassium, sodium phosphates  280-160-250 mg packet 2 packet  2 packet Oral PRN Loree Ramírez MD        promethazine (PHENERGAN) 12.5 mg in dextrose 5 % 50 mL IVPB  12.5 mg Intravenous Q8H PRN Thom Carson MD        senna-docusate 8.6-50 mg per tablet 1 tablet  1 tablet Oral Daily Thom Carson MD        sodium chloride 0.9% flush 10 mL  10 mL Intravenous PRN Susanne Irvin MD           Interval HPI:   Overnight events: AG remains closed. -316 on D51/2NS. Getting aspart 6 U q4 and MDSSI. Plans for step-down from [facility].   Eating:   <25%  Nausea: Yes  Hypoglycemia and intervention: No  Fever: No  TPN and/or TF: No  If yes, type of TF/TPN and rate: n/a    PMH, PSH, FH, SH updated and reviewed     ROS:    Review of Systems   Constitutional: Positive for appetite change. Negative for fever.   HENT: Negative for congestion and sneezing.    Eyes: Negative for discharge and redness.   Respiratory: Negative for cough and shortness of breath.    Gastrointestinal: Positive for nausea. Negative for abdominal pain and vomiting.   Endocrine: Negative for polydipsia and polyuria.   Genitourinary: Negative for frequency and urgency.   Skin: Negative for pallor and rash.   Neurological: Positive for weakness and headaches.   Psychiatric/Behavioral: Negative for agitation and behavioral problems.       Labs Reviewed and Include:    Recent Labs   Lab 01/27/22  0627   *   CALCIUM 8.3*   ALBUMIN 2.5*   PROT 6.4      K 3.4*   CO2 24      BUN 29*   CREATININE 1.7*   ALKPHOS 120   ALT 22   AST 49*   BILITOT 0.7     Lab Results   Component Value Date    HGBA1C 11.5 (H) 01/26/2022       Nutritional status:   Body mass index is 26.95 kg/m².  Lab Results   Component Value Date    ALBUMIN 2.5 (L) 01/27/2022    ALBUMIN 2.4 (L) 01/27/2022    ALBUMIN 2.4 (L) 01/26/2022     No results found for: PREALBUMIN    Estimated Creatinine Clearance: 41.6 mL/min (A) (based on SCr of 1.7 mg/dL (H)).        PHYSICAL EXAMINATION:  Vitals:     01/27/22 1200   BP: 121/61   Pulse: 69   Resp: 19   Temp: 98.5 °F (36.9 °C)     Body mass index is 26.95 kg/m².    Physical Exam  Constitutional:       Appearance: He is ill-appearing. He is not toxic-appearing.   HENT:      Head: Normocephalic and atraumatic.      Mouth/Throat:      Mouth: Mucous membranes are moist.      Pharynx: Oropharynx is clear.   Eyes:      General: No scleral icterus.     Conjunctiva/sclera: Conjunctivae normal.   Cardiovascular:      Rate and Rhythm: Normal rate and regular rhythm.      Pulses: Normal pulses.      Heart sounds: Normal heart sounds.   Pulmonary:      Effort: Pulmonary effort is normal.      Breath sounds: Normal breath sounds.   Abdominal:      General: Abdomen is flat.      Palpations: Abdomen is soft.      Tenderness: There is no abdominal tenderness.   Musculoskeletal:      Cervical back: Normal range of motion and neck supple.      Right lower leg: No edema.      Left lower leg: No edema.   Skin:     General: Skin is warm and dry.   Neurological:      Mental Status: He is alert and oriented to person, place, and time.      Motor: Weakness present.   Psychiatric:         Mood and Affect: Mood normal.         Behavior: Behavior normal.       .     ASSESSMENT and PLAN:    Ketosis-prone diabetes mellitus  Key History and Diagnostic Findings    IDDM2 presents with DKA in the setting of hemorraghic conversion of recent RMCA infarct c/b focal seizures and new NSTEMI.   Started on insulin drip and transitioned to SQ by primary team once AG closed and bicarb >18.  Decreased appetite and nausea limiting meal intake.   Takes 17 U lantus qhs and 17 U aspart TID at home.   Currently on aspart 6 U q4 and MDSSI as well as D5 1/2NS.   -316. AG 9. Bicarb 24.       Recommendations:   - aspart 2-4U TID w meals (2 U if eating <50% of meal, 4 U if eating 100% of meal)  - detemir 8 U BID   - LDSSI  - discontinue IV dextrose  - diabetic diet and POCT glucose check ACHS    - We will  continue to follow along, thank you for this consultation      DISCHARGE NEEDS: will assess daily    Jenny Mathias MD  Endocrinology  [facility]

## 2022-01-27 NOTE — SUBJECTIVE & OBJECTIVE
Interval HPI:   Overnight events: AG remains closed. -316 on D51/2NS. Getting aspart 6 U q4 and MDSSI. Plans for step-down from [facility].   Eating:   <25%  Nausea: Yes  Hypoglycemia and intervention: No  Fever: No  TPN and/or TF: No  If yes, type of TF/TPN and rate: n/a    PMH, PSH, FH, SH updated and reviewed     ROS:    Review of Systems   Constitutional: Positive for appetite change. Negative for fever.   HENT: Negative for congestion and sneezing.    Eyes: Negative for discharge and redness.   Respiratory: Negative for cough and shortness of breath.    Gastrointestinal: Positive for nausea. Negative for abdominal pain and vomiting.   Endocrine: Negative for polydipsia and polyuria.   Genitourinary: Negative for frequency and urgency.   Skin: Negative for pallor and rash.   Neurological: Positive for weakness and headaches.   Psychiatric/Behavioral: Negative for agitation and behavioral problems.       Labs Reviewed and Include:    Recent Labs   Lab 01/27/22  0627   *   CALCIUM 8.3*   ALBUMIN 2.5*   PROT 6.4      K 3.4*   CO2 24      BUN 29*   CREATININE 1.7*   ALKPHOS 120   ALT 22   AST 49*   BILITOT 0.7     Lab Results   Component Value Date    HGBA1C 11.5 (H) 01/26/2022       Nutritional status:   Body mass index is 26.95 kg/m².  Lab Results   Component Value Date    ALBUMIN 2.5 (L) 01/27/2022    ALBUMIN 2.4 (L) 01/27/2022    ALBUMIN 2.4 (L) 01/26/2022     No results found for: PREALBUMIN    Estimated Creatinine Clearance: 41.6 mL/min (A) (based on SCr of 1.7 mg/dL (H)).        PHYSICAL EXAMINATION:  Vitals:    01/27/22 1200   BP: 121/61   Pulse: 69   Resp: 19   Temp: 98.5 °F (36.9 °C)     Body mass index is 26.95 kg/m².    Physical Exam  Constitutional:       Appearance: He is ill-appearing. He is not toxic-appearing.   HENT:      Head: Normocephalic and atraumatic.      Mouth/Throat:      Mouth: Mucous membranes are moist.      Pharynx: Oropharynx is clear.   Eyes:      General: No  scleral icterus.     Conjunctiva/sclera: Conjunctivae normal.   Cardiovascular:      Rate and Rhythm: Normal rate and regular rhythm.      Pulses: Normal pulses.      Heart sounds: Normal heart sounds.   Pulmonary:      Effort: Pulmonary effort is normal.      Breath sounds: Normal breath sounds.   Abdominal:      General: Abdomen is flat.      Palpations: Abdomen is soft.      Tenderness: There is no abdominal tenderness.   Musculoskeletal:      Cervical back: Normal range of motion and neck supple.      Right lower leg: No edema.      Left lower leg: No edema.   Skin:     General: Skin is warm and dry.   Neurological:      Mental Status: He is alert and oriented to person, place, and time.      Motor: Weakness present.   Psychiatric:         Mood and Affect: Mood normal.         Behavior: Behavior normal.

## 2022-01-27 NOTE — ASSESSMENT & PLAN NOTE
Uncontrolled DM2, repeat A1c 11.5  - BGs remain uncontrolled; Endocrine consulted  - Management per Endo, [facility]

## 2022-01-27 NOTE — SUBJECTIVE & OBJECTIVE
Interval History:   Pt transitioned off of insuline drip yesterday to subcu. placed on EEG and found to be having right frontal focal seizures. Loaded with vimpat and started on maintenance dose. Has been seizure free for 24 hours. Sugars have remained uncontrolled so endocrine consult was placed. Pt otherwise ok to step down to the floor. Hospital medicine transfer initiated.     Review of Systems   Constitutional: Negative.    HENT: Negative.    Respiratory: Negative.    Cardiovascular: Negative.    Gastrointestinal: Negative.    Endocrine: Negative.    Genitourinary: Negative.    Musculoskeletal: Negative.    Neurological: Positive for headaches.   Hematological: Negative.    Psychiatric/Behavioral: Negative.        Objective:     Vitals:  Temp: 98.6 °F (37 °C)  Pulse: 70  Rhythm: normal sinus rhythm  BP: 131/71  MAP (mmHg): 87  Resp: (!) 21  SpO2: (!) 94 %  O2 Device (Oxygen Therapy): room air    Temp  Min: 97.7 °F (36.5 °C)  Max: 98.6 °F (37 °C)  Pulse  Min: 68  Max: 82  BP  Min: 117/56  Max: 180/85  MAP (mmHg)  Min: 79  Max: 122  Resp  Min: 16  Max: 25  SpO2  Min: 84 %  Max: 99 %    01/26 0701 - 01/27 0700  In: 1939.1 [P.O.:630; I.V.:880.7]  Out: 3968 [Urine:3968]           Physical Exam  Constitutional:       General: He is not in acute distress.     Appearance: Normal appearance. He is normal weight. He is ill-appearing.   HENT:      Head: Normocephalic and atraumatic.      Nose: Nose normal.   Eyes:      Extraocular Movements: Extraocular movements intact.      Conjunctiva/sclera: Conjunctivae normal.      Pupils: Pupils are equal, round, and reactive to light.   Cardiovascular:      Rate and Rhythm: Normal rate and regular rhythm.      Pulses: Normal pulses.      Heart sounds: Normal heart sounds.   Pulmonary:      Effort: Pulmonary effort is normal. No respiratory distress.      Breath sounds: Normal breath sounds.   Abdominal:      General: Abdomen is flat. There is no distension.      Palpations:  Abdomen is soft.      Tenderness: There is no abdominal tenderness.   Musculoskeletal:         General: Normal range of motion.   Skin:     General: Skin is warm and dry.      Capillary Refill: Capillary refill takes 2 to 3 seconds.   Neurological:      General: No focal deficit present.      Mental Status: He is alert and oriented to person, place, and time.      Cranial Nerves: No cranial nerve deficit.      Sensory: No sensory deficit.   Psychiatric:         Mood and Affect: Mood normal.         Behavior: Behavior normal.         Thought Content: Thought content normal.           Medications:  Continuousheparin (porcine) in D5W, Last Rate: 15 Units/kg/hr (01/27/22 1000)    Scheduledamiodarone, 200 mg, Daily  aspirin, 81 mg, Daily  atorvastatin, 80 mg, Daily  clopidogreL, 75 mg, Daily  diltiaZEM, 30 mg, Q6H  insulin aspart U-100, 6 Units, Q4H  lacosamide (VIMPAT) IVPB, 100 mg, Q12H  losartan, 25 mg, Daily  metoprolol tartrate, 12.5 mg, BID  [START ON 1/28/2022] pantoprazole, 40 mg, Daily  piperacillin-tazobactam (ZOSYN) IVPB, 4.5 g, Q8H  senna-docusate 8.6-50 mg, 1 tablet, Daily    PRNdextrose 50%, 12.5 g, PRN  dextrose 50%, 25 g, PRN  hydrALAZINE, 10 mg, Q6H PRN  insulin aspart U-100, 1-10 Units, Q4H PRN  labetalol, 10 mg, Q6H PRN  magnesium oxide, 800 mg, PRN  magnesium oxide, 800 mg, PRN  nitroGLYCERIN, 0.4 mg, Q5 Min PRN  ondansetron, 4 mg, Q8H PRN  phenylephrine HCL 0.25%, 1 spray, Q6H PRN  potassium bicarbonate, 35 mEq, PRN  potassium bicarbonate, 50 mEq, PRN  potassium bicarbonate, 60 mEq, PRN  potassium, sodium phosphates, 2 packet, PRN  potassium, sodium phosphates, 2 packet, PRN  potassium, sodium phosphates, 2 packet, PRN  sodium chloride 0.9%, 10 mL, PRN      Today I personally reviewed pertinent medications, lines/drains/airways, imaging, cardiology results, laboratory results, microbiology results, notably:    Diet  Diet diabetic [facility]; 2800 Calorie; Cardiac (Low Na/Chol), Dysphagia  Mechanical Soft (IDDSI Level 5); Isolation Tray - Regular [place]; Thin  Diet diabetic [facility]; 2800 Calorie; Cardiac (Low Na/Chol), Dysphagia Mechanical Soft (IDDSI Level 5); Isolation Tray - Regular [place]; Thin

## 2022-01-27 NOTE — SUBJECTIVE & OBJECTIVE
Interval History: No acute events overnight. This AM, patient awake, alert, and sitting upright in bed. Improved mental status today. AAxOx4. Partakes in conversation. Reports some nausea this morning which resolved with Zofran. Discussed plan of care and answered questions at bedside.     Current Facility-Administered Medications   Medication Dose Route Frequency Provider Last Rate Last Admin    amiodarone tablet 200 mg  200 mg Per NG tube Daily Thom Carson MD   200 mg at 01/27/22 0916    aspirin chewable tablet 81 mg  81 mg Per NG tube Daily Danielle Batres MD   81 mg at 01/27/22 0915    atorvastatin tablet 80 mg  80 mg Per NG tube Daily Danielle Batres MD   80 mg at 01/27/22 0915    clopidogreL tablet 75 mg  75 mg Per NG tube Daily Danielle Batres MD   75 mg at 01/27/22 0915    dextrose 50% injection 12.5 g  12.5 g Intravenous PRN Aly Mclean, DO        dextrose 50% injection 25 g  25 g Intravenous PRN Aly Mclean, DO        diltiaZEM tablet 30 mg  30 mg Per NG tube Q6H Thom Carson MD   30 mg at 01/27/22 0607    heparin 25,000 units in dextrose 5% 250 mL (100 units/mL) infusion LOW INTENSITY nomogram - [facility]  0-40 Units/kg/hr (Adjusted) Intravenous Continuous Danielle Batres MD 12.9 mL/hr at 01/27/22 1000 15 Units/kg/hr at 01/27/22 1000    hydrALAZINE injection 10 mg  10 mg Intravenous Q6H PRN Thom Carson MD   10 mg at 01/27/22 0354    insulin aspart U-100 pen 1-10 Units  1-10 Units Subcutaneous Q4H PRN Thom Carson MD   4 Units at 01/27/22 0920    insulin aspart U-100 pen 6 Units  6 Units Subcutaneous Q4H Thom Carson MD   6 Units at 01/27/22 0919    labetalol 20 mg/4 mL (5 mg/mL) IV syring  10 mg Intravenous Q6H PRN Thom Carson MD   10 mg at 01/27/22 0636    lacosamide (VIMPAT) 100 mg in sodium chloride 0.9% 100 mL IVPB  100 mg Intravenous Q12H Thom Carson MD   Stopped at 01/27/22 0947    losartan tablet 25 mg  25 mg Per NG tube Daily Thom  SUZETTE Carson MD   25 mg at 01/27/22 0915    magnesium oxide tablet 800 mg  800 mg Oral PRN Loree Ramírez MD        magnesium oxide tablet 800 mg  800 mg Oral PRN Loree Ramírez MD        metoprolol tartrate (LOPRESSOR) split tablet 12.5 mg  12.5 mg Per NG tube BID Thom Carson MD   12.5 mg at 01/27/22 0915    nitroGLYCERIN SL tablet 0.4 mg  0.4 mg Sublingual Q5 Min PRN Danielle Batres MD        ondansetron injection 4 mg  4 mg Intravenous Q8H PRN Susanne Irvin MD   4 mg at 01/27/22 0727    [START ON 1/28/2022] pantoprazole suspension 40 mg  40 mg Per NG tube Daily Thom Carson MD        phenylephrine HCL 0.25% nasal spray 1 spray  1 spray Each Nostril Q6H PRN Thom Carson MD        piperacillin-tazobactam 4.5 g in sodium chloride 0.9% 100 mL IVPB (ready to mix system)  4.5 g Intravenous Q8H Thom Carson MD   Stopped at 01/27/22 0808    potassium bicarbonate disintegrating tablet 35 mEq  35 mEq Oral PRN Loree Ramírez MD   35 mEq at 01/27/22 0914    potassium bicarbonate disintegrating tablet 50 mEq  50 mEq Oral PRN Loree Ramírez MD        potassium bicarbonate disintegrating tablet 60 mEq  60 mEq Oral PRN Loree Ramírez MD        potassium, sodium phosphates 280-160-250 mg packet 2 packet  2 packet Oral PRN Loree Ramírez MD        potassium, sodium phosphates 280-160-250 mg packet 2 packet  2 packet Oral PRN Loree Ramírez MD        potassium, sodium phosphates 280-160-250 mg packet 2 packet  2 packet Oral PRN Loree Ramírez MD        senna-docusate 8.6-50 mg per tablet 1 tablet  1 tablet Oral Daily Thom Carson MD        sodium chloride 0.9% flush 10 mL  10 mL Intravenous PRN Susanne Irvin MD         Continuous Infusions:   heparin (porcine) in D5W 15 Units/kg/hr (01/27/22 1000)       Review of Systems   Constitutional: Negative for chills, diaphoresis, fatigue and fever.   HENT: Negative for hearing loss, sore throat and trouble swallowing.     Respiratory: Negative for cough, shortness of breath and wheezing.    Cardiovascular: Negative for chest pain and palpitations.   Gastrointestinal: Positive for nausea (resolved). Negative for abdominal pain and vomiting.   Genitourinary: Negative for difficulty urinating, dysuria and frequency.   Neurological: Negative for dizziness, seizures, speech difficulty and headaches.   Psychiatric/Behavioral: Negative for agitation, behavioral problems and confusion. The patient is not nervous/anxious.      Objective:     Vital Signs (Most Recent):  Temp: 98.6 °F (37 °C) (01/27/22 0705)  Pulse: 70 (01/27/22 1000)  Resp: (!) 21 (01/27/22 1000)  BP: 131/71 (01/27/22 1000)  SpO2: (!) 94 % (01/27/22 1000) Vital Signs (24h Range):  Temp:  [97.7 °F (36.5 °C)-98.6 °F (37 °C)] 98.6 °F (37 °C)  Pulse:  [68-82] 70  Resp:  [16-25] 21  SpO2:  [84 %-99 %] 94 %  BP: (117-180)/(56-92) 131/71     Weight: 95.2 kg (209 lb 14.1 oz)  Body mass index is 26.95 kg/m².    Physical Exam  Constitutional:       General: He is not in acute distress.     Appearance: He is not diaphoretic.   HENT:      Head: Normocephalic.      Comments: EEG in place  Eyes:      General: No scleral icterus.        Right eye: No discharge.         Left eye: No discharge.      Conjunctiva/sclera: Conjunctivae normal.      Pupils: Pupils are equal, round, and reactive to light.   Cardiovascular:      Rate and Rhythm: Normal rate.   Pulmonary:      Effort: Pulmonary effort is normal. No respiratory distress.   Abdominal:      General: There is no distension.      Palpations: Abdomen is soft.      Tenderness: There is no abdominal tenderness.   Musculoskeletal:         General: No deformity or signs of injury.   Skin:     General: Skin is warm and dry.   Neurological:      Mental Status: He is oriented to person, place, and time.   Psychiatric:         Mood and Affect: Mood normal.         Behavior: Behavior normal.         NEUROLOGICAL EXAMINATION:     MENTAL STATUS    Oriented to person, place, and time.     CRANIAL NERVES     CN III, IV, VI   Pupils are equal, round, and reactive to light.       Awake, alert  THADDEUS OU   Corneal intact OU   + spontaneous eye opening   L facial droop  Tongue midline   Moves all extremities   Follows commands    Exam findings to suggest seizures:  Myoclonus - no   eye twitching - no   Nystagmus - no   gaze deviation - no   waxy rigidity - no        Significant Labs: All pertinent lab results from the past 24 hours have been reviewed.    Significant Studies: I have reviewed all pertinent imaging results/findings within the past 24 hours.

## 2022-01-27 NOTE — HOSPITAL COURSE
01/27/2022 Pt transitioned off of insuline drip yesterday to subcu. placed on EEG and found to be having right frontal focal seizures. Loaded with vimpat and started on maintenance dose. Has been seizure free for 24 hours. Sugars have remained uncontrolled so endocrine consult was placed. Pt otherwise ok to step down to the floor. Hospital medicine transfer initiated.   01/28/2022 pt still hyperglycemic overnight, insulin regimen being adjusted per endo recs. Also scheduling reglan to improve pts N/V. Otherwise stable and pt stepping down to floor with hospital medicine.

## 2022-01-27 NOTE — ASSESSMENT & PLAN NOTE
Key History and Diagnostic Findings    IDDM2 presents with DKA in the setting of hemorraghic conversion of recent RMCA infarct c/b focal seizures and new NSTEMI.   Started on insulin drip and transitioned to SQ by primary team once AG closed and bicarb >18.  Decreased appetite and nausea limiting meal intake.   Takes 17 U lantus qhs and 17 U aspart TID at home.   Currently on aspart 6 U q4 and MDSSI as well as D5 1/2NS.   -316. AG 9. Bicarb 24.       Recommendations:   - aspart 2-4U TID w meals (2 U if eating <50% of meal, 4 U if eating 100% of meal)  - detemir 8 U BID   - LDSSI  - discontinue IV dextrose  - diabetic diet and POCT glucose check ACHS    - We will continue to follow along, thank you for this consultation

## 2022-01-27 NOTE — HPI
"Mr. Kamar Muñoz is a 76-year-old-man with a history of CAD, T2DM, HLD, HTN, recent RMCA stroke with LSW, admitted for AMS found to have hemorraghic conversion of recent RMCA infarct and DKA; hospital course c/b R focal seizures and NSTEMI. Endocrinology consulted for "after DKA."     Upon arrival . pH 7.216. Bicarb 7. AG 29. BHB 4.8. UA 1+ ketones. Placed on insulin drip on admission, discontinued 1/26 after bicarb >18 and AG closed. Now on SQ aspart 6 U q4 hrs + MDSSI as well as D51/2 NS. Not tolerating diet well due to ongoing nausea, receiving prn zofran. Recent -->316-->250.   On vimpat for focal seizures. On heparin drip for NSTEMI, no chest pain upon evaluation.      Regarding Diabetes Mellitus:  - Initially diagnosed with Type 2 diabetes mellitus before 2004.   - A1c 11.5% on admission   - Current symptoms: nausea   - Denies:  chest pain, Polyuria/polydipsia, abdominal discomfort, numbness/tingling, visual changes, or subjective weight changes  Wt Readings from Last 5 Encounters:   01/26/22 95.2 kg (209 lb 14.1 oz)   01/19/22 89.7 kg (197 lb 12 oz)   01/14/22 90 kg (198 lb 6.6 oz)   01/10/22 90.3 kg (199 lb)   01/05/22 92.1 kg (203 lb 0.7 oz)     - Pertinent factors: admitted with DKA, managed by [facility].   - Denies: history of pancreatitis, recurrent gallstones, daily alcohol use, MEN syndrome, pancreatic tumors, or gastroparesis  - Known diabetic complications: cerebrovascular disease, neuropathy, CAD, UTIs  - Cardiovascular risk factors: advanced age (older than 55 for men, 65 for women), diabetes mellitus, dyslipidemia, hypertension, and male gender    - Current diabetic medications include:   1. Lantus 17 U qhs  2. Aspart 17 U TID with meals   3. Metformin 500 mg 2 times a day     - Patient now on or previously been on a GLP-1 agonist: no  - Current diet: in general, a "healthy" diet    - Current glucose monitoring regimen:  1 x/day  - Any episodes of hypoglycemia? no  - Family Hx:  Denies " FH of diabetes or thyroid disorder       Diabetes Management Status  - Statin: atorvastatin 80 mg qd  - ACE/ARB: losartan 25 mg qd  - Seen Optometry/Ophthalmology within last 12 months: no    A complete 14 point review of systems was conducted and negative except for what is stated above.

## 2022-01-27 NOTE — ASSESSMENT & PLAN NOTE
Profoundly hyperglycemic, acidotic, +beta hydrox  Concern for infarction with positive trop and ecg changes although this may be persistent tropinemia  -s/p insulin drip, K greater than 6 initially   -monitor BG  -IVF  - sugars still uncontrolled, endocrine consult placed

## 2022-01-27 NOTE — PROGRESS NOTES
[facility]  Neurology-Epilepsy  Progress Note    Patient Name: Kamar Muñoz  MRN: [id number]  Admission Date: 1/25/2022  Hospital Length of Stay: 2 days  Code Status: Full Code   Attending Provider: Thom Carson MD  Primary Care Physician: Basim Guerrero MD   Principal Problem:Encephalopathy, metabolic    Subjective:     Hospital Course:   EEG 1/25>1/26: multiple subclinical seizures with right hemispheric onset  EEG 1/26>1/27: moderate diffuse background slowing, no clinical or electrographic seizures, patient removes majority of electrodes by end of study      Interval History: No acute events overnight. This AM, patient awake, alert, and sitting upright in bed. Improved mental status today. AAxOx4. Partakes in conversation. Reports some nausea this morning which resolved with Zofran. Discussed plan of care and answered questions at bedside.     Current Facility-Administered Medications   Medication Dose Route Frequency Provider Last Rate Last Admin    amiodarone tablet 200 mg  200 mg Per NG tube Daily Thom Carson MD   200 mg at 01/27/22 0916    aspirin chewable tablet 81 mg  81 mg Per NG tube Daily Danielle Batres MD   81 mg at 01/27/22 0915    atorvastatin tablet 80 mg  80 mg Per NG tube Daily Danielle Batres MD   80 mg at 01/27/22 0915    clopidogreL tablet 75 mg  75 mg Per NG tube Daily Danielle Batres MD   75 mg at 01/27/22 0915    dextrose 50% injection 12.5 g  12.5 g Intravenous PRN Aly Mclean, DO        dextrose 50% injection 25 g  25 g Intravenous PRN Aly Mclean, DO        diltiaZEM tablet 30 mg  30 mg Per NG tube Q6H Thom Carson MD   30 mg at 01/27/22 0607    heparin 25,000 units in dextrose 5% 250 mL (100 units/mL) infusion LOW INTENSITY nomogram - [facility]  0-40 Units/kg/hr (Adjusted) Intravenous Continuous Danielle Batres MD 12.9 mL/hr at 01/27/22 1000 15 Units/kg/hr at 01/27/22 1000    hydrALAZINE injection 10 mg  10 mg Intravenous  Q6H PRN Thom Carson MD   10 mg at 01/27/22 0354    insulin aspart U-100 pen 1-10 Units  1-10 Units Subcutaneous Q4H PRN Thom Carson MD   4 Units at 01/27/22 0920    insulin aspart U-100 pen 6 Units  6 Units Subcutaneous Q4H Thom Carson MD   6 Units at 01/27/22 0919    labetalol 20 mg/4 mL (5 mg/mL) IV syring  10 mg Intravenous Q6H PRN Thom Carson MD   10 mg at 01/27/22 0636    lacosamide (VIMPAT) 100 mg in sodium chloride 0.9% 100 mL IVPB  100 mg Intravenous Q12H Thom Carson MD   Stopped at 01/27/22 0947    losartan tablet 25 mg  25 mg Per NG tube Daily Thom Carson MD   25 mg at 01/27/22 0915    magnesium oxide tablet 800 mg  800 mg Oral PRN Loree Ramírez MD        magnesium oxide tablet 800 mg  800 mg Oral PRN Loree Ramírez MD        metoprolol tartrate (LOPRESSOR) split tablet 12.5 mg  12.5 mg Per NG tube BID Thom Carson MD   12.5 mg at 01/27/22 0915    nitroGLYCERIN SL tablet 0.4 mg  0.4 mg Sublingual Q5 Min PRN Danielle Batres MD        ondansetron injection 4 mg  4 mg Intravenous Q8H PRN Susanne Irvin MD   4 mg at 01/27/22 0727    [START ON 1/28/2022] pantoprazole suspension 40 mg  40 mg Per NG tube Daily Thom Carson MD        phenylephrine HCL 0.25% nasal spray 1 spray  1 spray Each Nostril Q6H PRN Thom Carson MD        piperacillin-tazobactam 4.5 g in sodium chloride 0.9% 100 mL IVPB (ready to mix system)  4.5 g Intravenous Q8H Thom Carson MD   Stopped at 01/27/22 0808    potassium bicarbonate disintegrating tablet 35 mEq  35 mEq Oral PRN Loree Ramírez MD   35 mEq at 01/27/22 0914    potassium bicarbonate disintegrating tablet 50 mEq  50 mEq Oral PRN Loree Ramírez MD        potassium bicarbonate disintegrating tablet 60 mEq  60 mEq Oral PRN Loree Ramírez MD        potassium, sodium phosphates 280-160-250 mg packet 2 packet  2 packet Oral PRN Loree Ramírez MD        potassium, sodium phosphates  280-160-250 mg packet 2 packet  2 packet Oral PRN Loree Ramírez MD        potassium, sodium phosphates 280-160-250 mg packet 2 packet  2 packet Oral PRN Loree Ramírez MD        senna-docusate 8.6-50 mg per tablet 1 tablet  1 tablet Oral Daily Thom Carson MD        sodium chloride 0.9% flush 10 mL  10 mL Intravenous PRN Susanne Irvin MD         Continuous Infusions:   heparin (porcine) in D5W 15 Units/kg/hr (01/27/22 1000)       Review of Systems   Constitutional: Negative for chills, diaphoresis, fatigue and fever.   HENT: Negative for hearing loss, sore throat and trouble swallowing.    Respiratory: Negative for cough, shortness of breath and wheezing.    Cardiovascular: Negative for chest pain and palpitations.   Gastrointestinal: Positive for nausea (resolved). Negative for abdominal pain and vomiting.   Genitourinary: Negative for difficulty urinating, dysuria and frequency.   Neurological: Negative for dizziness, seizures, speech difficulty and headaches.   Psychiatric/Behavioral: Negative for agitation, behavioral problems and confusion. The patient is not nervous/anxious.      Objective:     Vital Signs (Most Recent):  Temp: 98.6 °F (37 °C) (01/27/22 0705)  Pulse: 70 (01/27/22 1000)  Resp: (!) 21 (01/27/22 1000)  BP: 131/71 (01/27/22 1000)  SpO2: (!) 94 % (01/27/22 1000) Vital Signs (24h Range):  Temp:  [97.7 °F (36.5 °C)-98.6 °F (37 °C)] 98.6 °F (37 °C)  Pulse:  [68-82] 70  Resp:  [16-25] 21  SpO2:  [84 %-99 %] 94 %  BP: (117-180)/(56-92) 131/71     Weight: 95.2 kg (209 lb 14.1 oz)  Body mass index is 26.95 kg/m².    Physical Exam  Constitutional:       General: He is not in acute distress.     Appearance: He is not diaphoretic.   HENT:      Head: Normocephalic.      Comments: EEG in place  Eyes:      General: No scleral icterus.        Right eye: No discharge.         Left eye: No discharge.      Conjunctiva/sclera: Conjunctivae normal.      Pupils: Pupils are equal, round, and  reactive to light.   Cardiovascular:      Rate and Rhythm: Normal rate.   Pulmonary:      Effort: Pulmonary effort is normal. No respiratory distress.   Abdominal:      General: There is no distension.      Palpations: Abdomen is soft.      Tenderness: There is no abdominal tenderness.   Musculoskeletal:         General: No deformity or signs of injury.   Skin:     General: Skin is warm and dry.   Neurological:      Mental Status: He is oriented to person, place, and time.   Psychiatric:         Mood and Affect: Mood normal.         Behavior: Behavior normal.         NEUROLOGICAL EXAMINATION:     MENTAL STATUS   Oriented to person, place, and time.     CRANIAL NERVES     CN III, IV, VI   Pupils are equal, round, and reactive to light.       Awake, alert  THADDEUS OU   Corneal intact OU   + spontaneous eye opening   L facial droop  Tongue midline   Moves all extremities   Follows commands    Exam findings to suggest seizures:  Myoclonus - no   eye twitching - no   Nystagmus - no   gaze deviation - no   waxy rigidity - no        Significant Labs: All pertinent lab results from the past 24 hours have been reviewed.    Significant Studies: I have reviewed all pertinent imaging results/findings within the past 24 hours.    Assessment and Plan:     Focal seizures  76M PMHx HTN, HLD, CAD with recent STEMI s/p PCI on 1/9/2022, afib on Eliquis, uncontrolled DM2 with admissions for DKA, and recent CVA with R frontal and L cerebellar infarcts on 1/12/2022 who presented to the ER via EMS for AMS, slurred speech, L facial droop, and LSW. Stroke code called. MRI Brain revealed moderate-sized subacute R frontal and L inferior cerebellar infarcts with associated petechial hemorrhage, overlying sulcal FLAIR hyperintensity likely reflecting some associated localized subarachnoid blood products, no new hemorrhage or infarct; MRA grossly stable from prior, no new high-grade stenosis or large vessel occlusion. Patient admitted to MICU for  further management of encephalopathy, DKA, and BRENDAN. Patient developed chest pain overnight and started on ACS protocol for NSTEMI after evaluation by cardiology. Patient transferred to [facility] 1/26/2022 for close neurologic monitoring in setting of heparin drip and cerebral bleed. EEG revealed multiple subclinical seizures with R hemispheric onset. Epilepsy following for assistance in management.     Recommendations:  - Discontinue vEEG  - Recommend to continue Lacosamide 100 mg BID on discharge  - Cefepime discontinued 1/26  - Avoid agents that lower seizure threshold  - Management of infectious/metabolic abnormalities per [facility]  - Seizure precautions  - Will arrange outpatient follow up in Epilepsy clinic      Discussed plan of care with patient and NCC team. No family at bedside. Will sign off, please call with questions.    NSTEMI (non-ST elevated myocardial infarction)  PMHx of CAD with recent STEMI s/p PCI on 1/9/2022  - Patient developed chest pain after admission and started on ACS protocol after evaluation by cardiology  - On heparin gtt  - Management per Cardiology, [facility]    History of ischemic stroke in prior three months  - Recent CVA with R frontal and L cerebellar infarcts on 1/12/2022 who presented to the ER 1/25/2022 via EMS for AMS, slurred speech, left facial droop, and LSW  - MRI Brain 1/25 revealed moderate-sized subacute right frontal and left inferior cerebellar infarcts with associated petechial hemorrhage, overlying sulcal FLAIR hyperintensity likely reflecting some associated localized subarachnoid blood products, no new hemorrhage or infarct; MRA grossly stable from prior, no new high-grade stenosis or large vessel occlusion  - Management per Vascular Neurology, [facility]    BRENDAN (acute kidney injury)  - Cr 3.2 on admit  - Continues to trend down, Cr 1.7 today  - Management per [facility]    DKA (diabetic ketoacidosis)  Uncontrolled DM2, repeat A1c 11.5  - BGs remain uncontrolled; Endocrine consulted  -  Management per Endo, NCC        VTE Risk Mitigation (From admission, onward)         Ordered     Place sequential compression device  Until discontinued         01/27/22 0839     heparin 25,000 units in dextrose 5% 250 mL (100 units/mL) infusion LOW INTENSITY nomogram - [facility]  Continuous        Question Answer Comment   Heparin Infusion Adjustment (DO NOT MODIFY ANSWER) [email or web address] LOW INTENSITY nomogram for [facility] [email or web address]    Begin at (in units/kg/hr) 12        01/25/22 2247     Place sequential compression device  Until discontinued         01/25/22 2247     IP VTE HIGH RISK PATIENT  Once         01/25/22 1325                Jazmín Sánchez PA-C  Neurology-Epilepsy  [facility]  Staff: Dr. Denny

## 2022-01-27 NOTE — ASSESSMENT & PLAN NOTE
Pt placed on EEG yesterday and found to be having right frontal focal seizures.   Loaded with Vimpat and now on 100mg BID.   Seizure free for 24 hours

## 2022-01-27 NOTE — ASSESSMENT & PLAN NOTE
[facility] on 1/9/22 with two vessel coronary artery disease with 100% occlusion of mid RCA and 70% stenosis of proximal LAD., RCA stent x2  - trop 0.143 today, no active chest pain, ecg with new LBBB and TWI in inferior leads, repeat trop/ecg pending  - trop was 12 twelve days ago  - trend trop/ecg  - Heparin gtt in the meantime

## 2022-01-27 NOTE — ASSESSMENT & PLAN NOTE
PMHx of CAD with recent STEMI s/p PCI on 1/9/2022  - Patient developed chest pain after admission and started on ACS protocol after evaluation by cardiology  - On heparin gtt  - Management per Cardiology, NCC

## 2022-01-27 NOTE — ASSESSMENT & PLAN NOTE
His chest pain is atypical however similar to his pain he experienced with his STEMI.  He says he is compliant with his DAPT but unclear if so.  Is tolerating eliquis as well.  Had PCI to  of RCA on 1/9/2022 however LAD lesions not intervened on. No new EKG changes. Troponin now decreasing. Last EF was 55% on 1/13/2022.    - Discussed with ICU team and cardiology staff/interventional fellow  - Manage hypertension with home medications: lopressor, losartan, & diltiazem  - Discontinue tropinin and ECG trending   - F/U TTE results: EF 50% and septal and LV WMAs  - GFR has declined over past month from >60 to 37.8. Optimize renal function prior to further intervention  - Discontinue Heparin gtt at 0100 on 1/28/22. Start AC with apixiban 20mg qd for nonvalvular Afib  - Continue DAPT with ASA 81mg qd and plavix 75mg qd   - Stop amiodarone drip and load amiodarone PO 400mg BID x14 days followed by 200mg daily.  - Nitro PRN for any chest discomfort  - Avoid nephrotoxic medications  - F/U with Cardiology outpatient for future PET stress echo.

## 2022-01-27 NOTE — PROCEDURES
Procedures  ICU EEG/VIDEO MONITORING REPORT    DATE OF SERVICE: 1/26/22-1/27/22  EEG NUMBER: [id number]  REQUESTED BY:  Brandee  LOCATION OF SERVICE: [facility] [place]    METHODOLOGY   Electroencephalographic (EEG) recording is with electrodes placed according to the International 10-20 placement system.  Thirty two (32) channels of digital signal are simultaneously recorded from the scalp and may include EKG, EMG, and/or eye monitors.   Recording band pass was 0.1 to 512 hz.  Digital video recording of the patient is simultaneously recorded with the EEG.  The nursing staff report clinical symptoms and may press an event button when the patient has symptoms of clinical interest to the treating physicians.  EEG and video recording is stored and archived in digital format.  The entire recording is visually reviewed and the times identified by computer analysis as being spikes or seizures are reviewed again.  Activation procedures which include photic stimulation, hyperventilation and instructing patients to perform simple task are done in selected patients.   Compresses spectral analysis (CSA) is also performed on the activity recorded from each individual channel.  This is displayed as a power display of frequencies from 0 to 30 Hz over time.   The CSA analysis is done and displayed continuously.  This is reviewed for asymmetries in power between homologous areas of the scalp and for presence of changes in power which canbe seen when seizures occur.  Sections of suspected abnormalities on the CSA is then compared with the original EEG recording.     [employer] software was also utilized in the review of this study.  This software suite analyzes the EEG recording in multiple domains.  Coherence and rhythmicity is computed to identify EEG sections which may contain organized seizures.  Each channel undergoes analysis to detect presence of spike and sharp waves which have special and morphological characteristic of epileptic activity.   The routine EEG recording is converted from spacial into frequency domain.  This is then displayed comparing homologous areas to identify areas of significant asymmetry.  Algorithm to identify non-cortically generated artifact is used to separate eye movement, EMG and other artifact from the EEG.      Recording Times  Start on 1/26/22 at 07:00:59  Stop on 1/27/22 at 07:00:09  Start on 1/27/22 at 07:01:15  Stop on 1/27/22 at 08:38:50  A total of 24 Hours of EEG was recorded.    EEG FINDINGS  The record shows a fair  organization at rest, consisting of a 8 Hz posterior dominant rhythm with good  reactivity. There is mild bilateral beta activity. There is bilateral, independent continuous delta and theta range background slowing with shifting predominance.  Later in the study, the patient removes the majority of the electrodes.    Drowsiness is characterized by attenuation of the background, vertex waves, and bilateral theta slowing. Stage II sleep is characterized by slowing, vertex waves, and symmetric sleep spindles.    Provocative maneuvers including hyperventilation and photic stimulation were not performed.     EKG recording shows a regular rhythm.    There is no push button or clinical event.    IMPRESSION:  Abnormal study due to the presence of moderate  diffuse background slowing consistent with diffuse cerebral dysfunction and encephalopathy which may be on the basis of toxic, metabolic, or primary neuronal disorder. Patient removes the majority of the electrodes by the end of the study.    Domingo Denny MD  Department of Neurology  [facility]

## 2022-01-27 NOTE — ASSESSMENT & PLAN NOTE
His chest pain is atypical however similar to his pain he experienced with his STEMI.  He says he is compliant with his DAPT but unclear if so.  Is tolerating eliquis as well.  Had PCI to  of RCA on 1/9/2022 however LAD lesions not intervened on. No new EKG changes. Troponin now decreasing. Last EF was 55% on 1/13/2022.    - Discussed with ICU team and cardiology staff/interventional fellow  - Manage hypertension with home medications: lopressor, losartan, & diltiazem  - Continue Amiodarone 200mg qd for Afib   - Discontinue tropinin and ECG trending   - F/U TTE results: EF 50% and septal and LV WMAs  - GFR has declined over past month from >60 to 37.8. Optimize renal function prior to further intervention  - Discontinue Heparin gtt at 0100 on 1/28/22. Start AC with apixiban 20mg qd for nonvalvular Afib  - Continue DAPT with ASA 81mg qd and plavix 75mg qd   - Nitro PRN for any chest discomfort  - Avoid nephrotoxic medications  - F/U with Cardiology outpatient for future PET stress echo.

## 2022-01-27 NOTE — SUBJECTIVE & OBJECTIVE
Interval History: Pt HDS with no acute events overnight. Oriented x4 this morning with no complaints of chest pain. TTE showing septal and LV WMA. ACS protocol continued. Currently Cr 1.7 with GFR 37.8. Previously Cr 1.1 and GFR >60 on 12/29/21.     Review of Systems   Constitutional: Positive for decreased appetite. Negative for diaphoresis, fever and malaise/fatigue.   HENT: Negative for congestion and sore throat.    Eyes: Negative for blurred vision, vision loss in left eye and vision loss in right eye.   Cardiovascular: Negative for chest pain, dyspnea on exertion, irregular heartbeat, leg swelling, near-syncope, palpitations and syncope.   Respiratory: Negative for cough, shortness of breath and wheezing.    Endocrine: Negative.    Hematologic/Lymphatic: Negative.    Skin: Negative.    Musculoskeletal: Negative for arthritis, back pain, joint pain and myalgias.   Gastrointestinal: Negative for abdominal pain, change in bowel habit, bowel incontinence, constipation, diarrhea, hematemesis, hematochezia and melena.   Genitourinary: Negative.    Neurological: Positive for disturbances in coordination and focal weakness. Negative for light-headedness and loss of balance.   Psychiatric/Behavioral: Negative.      Objective:     Vital Signs (Most Recent):  Temp: 98.6 °F (37 °C) (01/27/22 0705)  Pulse: 72 (01/27/22 0900)  Resp: 20 (01/27/22 0900)  BP: (!) 117/56 (01/27/22 0900)  SpO2: (!) 92 % (01/27/22 0900) Vital Signs (24h Range):  Temp:  [97.7 °F (36.5 °C)-98.6 °F (37 °C)] 98.6 °F (37 °C)  Pulse:  [68-82] 72  Resp:  [16-25] 20  SpO2:  [84 %-100 %] 92 %  BP: (117-180)/() 117/56     Weight: 95.2 kg (209 lb 14.1 oz)  Body mass index is 26.95 kg/m².     SpO2: (!) 92 %  O2 Device (Oxygen Therapy): room air      Intake/Output Summary (Last 24 hours) at 1/27/2022 0935  Last data filed at 1/27/2022 0700  Gross per 24 hour   Intake 1918.5 ml   Output 3858 ml   Net -1939.5 ml       Lines/Drains/Airways     Drain                  Urethral Catheter 01/25/22 1623 Straight-tip 16 Fr. 1 day          Peripheral Intravenous Line                 Peripheral IV - Single Lumen 01/25/22 1601 20 G Right Antecubital 1 day         Peripheral IV - Single Lumen 01/26/22 0148 22 G Left Hand 1 day         Peripheral IV - Single Lumen 01/26/22 1325 20 G Anterior;Left;Proximal Forearm <1 day                Physical Exam  Vitals and nursing note reviewed.   Constitutional:       Appearance: He is not toxic-appearing.   HENT:      Head: Normocephalic and atraumatic.   Eyes:      General: No scleral icterus.     Extraocular Movements: Extraocular movements intact.   Neck:      Vascular: No carotid bruit.   Cardiovascular:      Rate and Rhythm: Normal rate and regular rhythm.      Pulses: Normal pulses.      Heart sounds: Normal heart sounds. No murmur heard.  No gallop.    Pulmonary:      Effort: Pulmonary effort is normal. No respiratory distress.      Breath sounds: Normal breath sounds. No wheezing or rales.   Abdominal:      General: Abdomen is flat. Bowel sounds are normal. There is no distension.      Palpations: Abdomen is soft. There is no mass.      Tenderness: There is no abdominal tenderness. There is no rebound.   Musculoskeletal:      Cervical back: Normal range of motion.      Right lower leg: No edema.      Left lower leg: No edema.   Skin:     General: Skin is warm and dry.      Capillary Refill: Capillary refill takes less than 2 seconds.      Findings: No rash.   Neurological:      Mental Status: He is alert.         Significant Labs:   CMP   Recent Labs   Lab 01/26/22  1255 01/27/22  0019 01/27/22  0627    144 143   K 4.2 3.0* 3.4*   * 107 105   CO2 24 23 24   * 224* 316*   BUN 40* 32* 29*   CREATININE 2.1* 2.1* 1.7*   CALCIUM 7.9* 8.1* 8.3*   PROT 5.7* 5.9* 6.4   ALBUMIN 2.4* 2.4* 2.5*   BILITOT 0.5 0.5 0.7   ALKPHOS 107 113 120   AST 41* 47* 49*   ALT 18 19 22   ANIONGAP 9 14 14   ESTGFRAFRICA 33.8* 33.8* 43.7*    EGFRNONAA 29.3* 29.3* 37.8*   , CBC   Recent Labs   Lab 01/27/22  0349   WBC 14.87*   HGB 13.1*   HCT 40.1       and All pertinent lab results from the last 24 hours have been reviewed.    Significant Imaging: Echocardiogram:   Transthoracic echo (TTE) complete ([facility] Only):   Results for orders placed or performed during the hospital encounter of 01/25/22   Echo   Result Value Ref Range    Ascending aorta 3.26 cm    STJ 3.17 cm    AV mean gradient 5 mmHg    Ao peak marc 1.29 m/s    Ao VTI 27.40 cm    IVS 1.01 0.6 - 1.1 cm    LA size 4.30 cm    Left Atrium Major Axis 5.20 cm    Left Atrium Minor Axis 5.60 cm    LVIDd 5.73 3.5 - 6.0 cm    LVIDs 4.25 (A) 2.1 - 4.0 cm    LVOT diameter 2.17 cm    LVOT peak VTI 14.45 cm    Posterior Wall 0.91 0.6 - 1.1 cm    MV Peak A Marc 1.11 m/s    E wave deceleration time 205.32 msec    MV Peak E Marc 1.10 m/s    RA Major Axis 5.47 cm    RA Width 3.82 cm    RVDD 3.42 cm    Sinus 3.26 cm    TAPSE 2.20 cm    TDI LATERAL 0.13 m/s    TDI SEPTAL 0.08 m/s    LA WIDTH 4.50 cm    MV stenosis pressure 1/2 time 59.54 ms    LV Diastolic Volume 162.03 mL    LV Systolic Volume 80.67 mL    LVOT peak marc 0.76 m/s    LV LATERAL E/E' RATIO 8.46 m/s    LV SEPTAL E/E' RATIO 13.75 m/s    FS 26 %    LA volume 88.69 cm3    LV mass 216.56 g    Left Ventricle Relative Wall Thickness 0.32 cm    AV valve area 1.95 cm2    AV Velocity Ratio 0.59     AV index (prosthetic) 0.53     MV valve area p 1/2 method 3.69 cm2    E/A ratio 0.99     Mean e' 0.11 m/s    LVOT area 3.7 cm2    LVOT stroke volume 53.41 cm3    AV peak gradient 7 mmHg    E/E' ratio 10.48 m/s    BSA 2.21 m2    LV Systolic Volume Index 36.7 mL/m2    LV Diastolic Volume Index 73.65 mL/m2    LA Volume Index 40.3 mL/m2    LV Mass Index 98 g/m2    Right Atrial Pressure (from IVC) 15 mmHg    EF 50 %    Narrative    · There is abnormal septal wall motion.  · The left ventricle is normal in size with low normal systolic function.  · The estimated  ejection fraction is 50%.  · Normal left ventricular diastolic function.  · Mild left atrial enlargement.  · Normal right ventricular size with normal right ventricular systolic   function.  · Mild mitral regurgitation.  · There are segmental left ventricular wall motion abnormalities.  · Mild tricuspid regurgitation.  · Elevated central venous pressure (15 mmHg).

## 2022-01-27 NOTE — PROGRESS NOTES
[facility]  Neurocritical Care  Progress Note    Admit Date: 1/25/2022  Service Date: 01/27/2022  Length of Stay: 2    Subjective:     Chief Complaint: Encephalopathy, metabolic    History of Present Illness: Kamar Muñoz is a 76-year-old male with a history of CAD, type 2 diabetes, hyperlipidemia, hypertension, recent stroke without residual deficits who presenting with slurred speech, left facial droop and left-sided weakness via EMS.  Initially pt had a change in mental status and vomiting, also noted to have slurred speech, LKN was reportedly 8am. Noted to  have a BG greater than 400 at the seen. On arrival to ED symptoms continued to worsen, on imaging noted to have some subacute hemorrhagic conversion from old infarct. He also c/o chest pain similar to his recent ACS, but EKG & Trop, cardiology consulted  & recommended started ACS protocol: start heparin gtt, reload with plavix 300mg x1 and then 75mg qD, and continue 81mg ASA. Since patient has been on Eliquis, stroke team recommended is safe to start heparin drip. Patient was also on insulin drip for DKA. Contacted MICU team this AM to discuss management plan for the patient, and agreed that would be better to transfer to NCC service given requirement for close NCC monitorng while starting Heparin drip in context cerebral bleed.      Hospital Course: 01/27/2022 Pt transitioned off of insuline drip yesterday to subcu. placed on EEG and found to be having right frontal focal seizures. Loaded with vimpat and started on maintenance dose. Has been seizure free for 24 hours. Sugars have remained uncontrolled so endocrine consult was placed. Pt otherwise ok to step down to the floor. Hospital medicine transfer initiated.         Interval History:   Pt transitioned off of insuline drip yesterday to subcu. placed on EEG and found to be having right frontal focal seizures. Loaded with vimpat and started on maintenance dose. Has been seizure  free for 24 hours. Sugars have remained uncontrolled so endocrine consult was placed. Pt otherwise ok to step down to the floor. Hospital medicine transfer initiated.     Review of Systems   Constitutional: Negative.    HENT: Negative.    Respiratory: Negative.    Cardiovascular: Negative.    Gastrointestinal: Negative.    Endocrine: Negative.    Genitourinary: Negative.    Musculoskeletal: Negative.    Neurological: Positive for headaches.   Hematological: Negative.    Psychiatric/Behavioral: Negative.        Objective:     Vitals:  Temp: 98.6 °F (37 °C)  Pulse: 70  Rhythm: normal sinus rhythm  BP: 131/71  MAP (mmHg): 87  Resp: (!) 21  SpO2: (!) 94 %  O2 Device (Oxygen Therapy): room air    Temp  Min: 97.7 °F (36.5 °C)  Max: 98.6 °F (37 °C)  Pulse  Min: 68  Max: 82  BP  Min: 117/56  Max: 180/85  MAP (mmHg)  Min: 79  Max: 122  Resp  Min: 16  Max: 25  SpO2  Min: 84 %  Max: 99 %    01/26 0701 - 01/27 0700  In: 1939.1 [P.O.:630; I.V.:880.7]  Out: 3968 [Urine:3968]           Physical Exam  Constitutional:       General: He is not in acute distress.     Appearance: Normal appearance. He is normal weight. He is ill-appearing.   HENT:      Head: Normocephalic and atraumatic.      Nose: Nose normal.   Eyes:      Extraocular Movements: Extraocular movements intact.      Conjunctiva/sclera: Conjunctivae normal.      Pupils: Pupils are equal, round, and reactive to light.   Cardiovascular:      Rate and Rhythm: Normal rate and regular rhythm.      Pulses: Normal pulses.      Heart sounds: Normal heart sounds.   Pulmonary:      Effort: Pulmonary effort is normal. No respiratory distress.      Breath sounds: Normal breath sounds.   Abdominal:      General: Abdomen is flat. There is no distension.      Palpations: Abdomen is soft.      Tenderness: There is no abdominal tenderness.   Musculoskeletal:         General: Normal range of motion.   Skin:     General: Skin is warm and dry.      Capillary Refill: Capillary refill takes 2  to 3 seconds.   Neurological:      General: No focal deficit present.      Mental Status: He is alert and oriented to person, place, and time.      Cranial Nerves: No cranial nerve deficit.      Sensory: No sensory deficit.   Psychiatric:         Mood and Affect: Mood normal.         Behavior: Behavior normal.         Thought Content: Thought content normal.           Medications:  Continuousheparin (porcine) in D5W, Last Rate: 15 Units/kg/hr (01/27/22 1000)    Scheduledamiodarone, 200 mg, Daily  aspirin, 81 mg, Daily  atorvastatin, 80 mg, Daily  clopidogreL, 75 mg, Daily  diltiaZEM, 30 mg, Q6H  insulin aspart U-100, 6 Units, Q4H  lacosamide (VIMPAT) IVPB, 100 mg, Q12H  losartan, 25 mg, Daily  metoprolol tartrate, 12.5 mg, BID  [START ON 1/28/2022] pantoprazole, 40 mg, Daily  piperacillin-tazobactam (ZOSYN) IVPB, 4.5 g, Q8H  senna-docusate 8.6-50 mg, 1 tablet, Daily    PRNdextrose 50%, 12.5 g, PRN  dextrose 50%, 25 g, PRN  hydrALAZINE, 10 mg, Q6H PRN  insulin aspart U-100, 1-10 Units, Q4H PRN  labetalol, 10 mg, Q6H PRN  magnesium oxide, 800 mg, PRN  magnesium oxide, 800 mg, PRN  nitroGLYCERIN, 0.4 mg, Q5 Min PRN  ondansetron, 4 mg, Q8H PRN  phenylephrine HCL 0.25%, 1 spray, Q6H PRN  potassium bicarbonate, 35 mEq, PRN  potassium bicarbonate, 50 mEq, PRN  potassium bicarbonate, 60 mEq, PRN  potassium, sodium phosphates, 2 packet, PRN  potassium, sodium phosphates, 2 packet, PRN  potassium, sodium phosphates, 2 packet, PRN  sodium chloride 0.9%, 10 mL, PRN      Today I personally reviewed pertinent medications, lines/drains/airways, imaging, cardiology results, laboratory results, microbiology results, notably:    Diet  Diet diabetic [facility]; 2800 Calorie; Cardiac (Low Na/Chol), Dysphagia Mechanical Soft (IDDSI Level 5); Isolation Tray - Regular [place]; Thin  Diet diabetic [facility]; 2800 Calorie; Cardiac (Low Na/Chol), Dysphagia Mechanical Soft (IDDSI Level 5); Isolation Tray - Regular [place];  Thin        Assessment/Plan:     Neuro  * Encephalopathy, metabolic  likely related to DKA, critical illness may be causing re expression of stroke  - EEG  - Concern for infectious process triggering AMS/DKA  - mental status much improved, has been seizure free for 24 hours. Continue Vimpat per epilepsy.   - ready for step down to floor        Focal seizures  Pt placed on EEG yesterday and found to be having right frontal focal seizures.   Loaded with Vimpat and now on 100mg BID.   Seizure free for 24 hours     Cardiac/Vascular  NSTEMI (non-ST elevated myocardial infarction)  [facility] on 1/9/22 with two vessel coronary artery disease with 100% occlusion of mid RCA and 70% stenosis of proximal LAD., RCA stent x2  - trop 0.143 today, no active chest pain, ecg with new LBBB and TWI in inferior leads, repeat trop/ecg pending  - trop was 12 twelve days ago  - trend trop/ecg  - Heparin gtt in the meantime    Coronary artery disease involving native heart without angina pectoris  Resume DAPT/statin   Heparin gtt in the meantime; CTH when therapeutic    Paroxysmal atrial fibrillation  Metoprolol, amio, dilt  - Anticoagulation     Hypertension associated with diabetes  SBP<140    Renal/  Hyperkalemia  K of 6.2, did not shift in the setting of insulin drip and DKA  - monitor CMP    BRENDAN (acute kidney injury)  Baseline Cr 1  - 3.2 on arrival  - monitor s.cr & I&O  - lopez placed  - Avoid nephrotoxins    Endocrine  DKA (diabetic ketoacidosis)  Profoundly hyperglycemic, acidotic, +beta hydrox  Concern for infarction with positive trop and ecg changes although this may be persistent tropinemia  -s/p insulin drip, K greater than 6 initially   -monitor BG  -IVF  - sugars still uncontrolled, endocrine consult placed    Type 2 diabetes mellitus with diabetic peripheral angiopathy without gangrene, with long-term current use of insulin  Sugars still uncontrolled. See DKA          The patient is being Prophylaxed for:  Venous  Thromboembolism with: Mechanical  Stress Ulcer with: PPI  Ventilator Pneumonia with: not applicable    Activity Orders          Diet diabetic [facility]; 2800 Calorie; Cardiac (Low Na/Chol), Dysphagia Mechanical Soft (IDDSI Level 5); Isolation Tray - Regular [place]; Thin: Diabetic starting at 01/27 0934    Turn patient every 2 hours starting at 01/26 1400        Full Code    Loree Ramírez MD  Neurocritical Care  [facility]

## 2022-01-28 LAB
1,3 BETA GLUCAN SER-MCNC: 46 PG/ML
ALBUMIN SERPL BCP-MCNC: 2.2 G/DL (ref 3.5–5.2)
ALP SERPL-CCNC: 128 U/L (ref 55–135)
ALT SERPL W/O P-5'-P-CCNC: 20 U/L (ref 10–44)
ANION GAP SERPL CALC-SCNC: 10 MMOL/L (ref 8–16)
APTT BLDCRRT: 37.6 SEC (ref 21–32)
AST SERPL-CCNC: 45 U/L (ref 10–40)
BASOPHILS # BLD AUTO: 0.02 K/UL (ref 0–0.2)
BASOPHILS NFR BLD: 0.2 % (ref 0–1.9)
BILIRUB SERPL-MCNC: 0.9 MG/DL (ref 0.1–1)
BUN SERPL-MCNC: 24 MG/DL (ref 8–23)
CALCIUM SERPL-MCNC: 7.9 MG/DL (ref 8.7–10.5)
CHLORIDE SERPL-SCNC: 106 MMOL/L (ref 95–110)
CO2 SERPL-SCNC: 24 MMOL/L (ref 23–29)
CREAT SERPL-MCNC: 1.5 MG/DL (ref 0.5–1.4)
DIFFERENTIAL METHOD: ABNORMAL
EOSINOPHIL # BLD AUTO: 0 K/UL (ref 0–0.5)
EOSINOPHIL NFR BLD: 0.1 % (ref 0–8)
ERYTHROCYTE [DISTWIDTH] IN BLOOD BY AUTOMATED COUNT: 14.8 % (ref 11.5–14.5)
EST. GFR  (AFRICAN AMERICAN): 50.8 ML/MIN/1.73 M^2
EST. GFR  (NON AFRICAN AMERICAN): 44 ML/MIN/1.73 M^2
FUNGITELL COMMENTS: NEGATIVE
GALACTOMANNAN AG SERPL IA-ACNC: <0.5 INDEX
GLUCOSE SERPL-MCNC: 291 MG/DL (ref 70–110)
HCT VFR BLD AUTO: 41.1 % (ref 40–54)
HGB BLD-MCNC: 13.1 G/DL (ref 14–18)
IMM GRANULOCYTES # BLD AUTO: 0.04 K/UL (ref 0–0.04)
IMM GRANULOCYTES NFR BLD AUTO: 0.4 % (ref 0–0.5)
LYMPHOCYTES # BLD AUTO: 0.8 K/UL (ref 1–4.8)
LYMPHOCYTES NFR BLD: 7.4 % (ref 18–48)
MAGNESIUM SERPL-MCNC: 2.1 MG/DL (ref 1.6–2.6)
MCH RBC QN AUTO: 28.3 PG (ref 27–31)
MCHC RBC AUTO-ENTMCNC: 31.9 G/DL (ref 32–36)
MCV RBC AUTO: 89 FL (ref 82–98)
MONOCYTES # BLD AUTO: 0.5 K/UL (ref 0.3–1)
MONOCYTES NFR BLD: 4.4 % (ref 4–15)
NEUTROPHILS # BLD AUTO: 9.9 K/UL (ref 1.8–7.7)
NEUTROPHILS NFR BLD: 87.5 % (ref 38–73)
NRBC BLD-RTO: 0 /100 WBC
PHOSPHATE SERPL-MCNC: 2.2 MG/DL (ref 2.7–4.5)
PLATELET # BLD AUTO: 252 K/UL (ref 150–450)
PMV BLD AUTO: 10.2 FL (ref 9.2–12.9)
POCT GLUCOSE: 245 MG/DL (ref 70–110)
POCT GLUCOSE: 263 MG/DL (ref 70–110)
POCT GLUCOSE: 281 MG/DL (ref 70–110)
POCT GLUCOSE: 307 MG/DL (ref 70–110)
POTASSIUM SERPL-SCNC: 5.1 MMOL/L (ref 3.5–5.1)
PROT SERPL-MCNC: 6.4 G/DL (ref 6–8.4)
RBC # BLD AUTO: 4.63 M/UL (ref 4.6–6.2)
SODIUM SERPL-SCNC: 140 MMOL/L (ref 136–145)
WBC # BLD AUTO: 11.32 K/UL (ref 3.9–12.7)

## 2022-01-28 PROCEDURE — 63600175 PHARM REV CODE 636 W HCPCS

## 2022-01-28 PROCEDURE — 83735 ASSAY OF MAGNESIUM: CPT | Performed by: STUDENT IN AN ORGANIZED HEALTH CARE EDUCATION/TRAINING PROGRAM

## 2022-01-28 PROCEDURE — C9254 INJECTION, LACOSAMIDE: HCPCS | Performed by: PSYCHIATRY & NEUROLOGY

## 2022-01-28 PROCEDURE — 99232 SBSQ HOSP IP/OBS MODERATE 35: CPT | Mod: GC,,, | Performed by: INTERNAL MEDICINE

## 2022-01-28 PROCEDURE — 25000003 PHARM REV CODE 250

## 2022-01-28 PROCEDURE — 11000001 HC ACUTE MED/SURG PRIVATE ROOM

## 2022-01-28 PROCEDURE — 80053 COMPREHEN METABOLIC PANEL: CPT | Performed by: NURSE PRACTITIONER

## 2022-01-28 PROCEDURE — 99233 SBSQ HOSP IP/OBS HIGH 50: CPT | Mod: GC,,, | Performed by: PSYCHIATRY & NEUROLOGY

## 2022-01-28 PROCEDURE — 84100 ASSAY OF PHOSPHORUS: CPT | Performed by: NURSE PRACTITIONER

## 2022-01-28 PROCEDURE — 85730 THROMBOPLASTIN TIME PARTIAL: CPT | Performed by: PSYCHIATRY & NEUROLOGY

## 2022-01-28 PROCEDURE — 63600175 PHARM REV CODE 636 W HCPCS: Performed by: PSYCHIATRY & NEUROLOGY

## 2022-01-28 PROCEDURE — 99233 PR SUBSEQUENT HOSPITAL CARE,LEVL III: ICD-10-PCS | Mod: GC,,, | Performed by: PSYCHIATRY & NEUROLOGY

## 2022-01-28 PROCEDURE — 25000003 PHARM REV CODE 250: Performed by: PSYCHIATRY & NEUROLOGY

## 2022-01-28 PROCEDURE — 85025 COMPLETE CBC W/AUTO DIFF WBC: CPT | Performed by: NURSE PRACTITIONER

## 2022-01-28 PROCEDURE — 99232 PR SUBSEQUENT HOSPITAL CARE,LEVL II: ICD-10-PCS | Mod: GC,,, | Performed by: INTERNAL MEDICINE

## 2022-01-28 RX ORDER — ATORVASTATIN CALCIUM 40 MG/1
80 TABLET, FILM COATED ORAL DAILY
Status: DISCONTINUED | OUTPATIENT
Start: 2022-01-28 | End: 2022-02-02 | Stop reason: HOSPADM

## 2022-01-28 RX ORDER — CLOPIDOGREL BISULFATE 75 MG/1
75 TABLET ORAL DAILY
Status: DISCONTINUED | OUTPATIENT
Start: 2022-01-28 | End: 2022-02-02 | Stop reason: HOSPADM

## 2022-01-28 RX ORDER — LOSARTAN POTASSIUM 25 MG/1
25 TABLET ORAL DAILY
Status: DISCONTINUED | OUTPATIENT
Start: 2022-01-28 | End: 2022-02-02 | Stop reason: HOSPADM

## 2022-01-28 RX ORDER — METOCLOPRAMIDE HYDROCHLORIDE 5 MG/ML
5 INJECTION INTRAMUSCULAR; INTRAVENOUS EVERY 8 HOURS
Status: DISCONTINUED | OUTPATIENT
Start: 2022-01-28 | End: 2022-01-28

## 2022-01-28 RX ORDER — PANTOPRAZOLE SODIUM 40 MG/1
40 FOR SUSPENSION ORAL DAILY
Status: DISCONTINUED | OUTPATIENT
Start: 2022-01-29 | End: 2022-01-28

## 2022-01-28 RX ORDER — LOSARTAN POTASSIUM 25 MG/1
25 TABLET ORAL DAILY
Status: DISCONTINUED | OUTPATIENT
Start: 2022-01-29 | End: 2022-01-28

## 2022-01-28 RX ORDER — AMIODARONE HYDROCHLORIDE 200 MG/1
200 TABLET ORAL DAILY
Status: DISCONTINUED | OUTPATIENT
Start: 2022-01-29 | End: 2022-01-28

## 2022-01-28 RX ORDER — METOPROLOL TARTRATE 25 MG/1
12.5 TABLET ORAL 2 TIMES DAILY
Status: DISCONTINUED | OUTPATIENT
Start: 2022-01-28 | End: 2022-01-29

## 2022-01-28 RX ORDER — PANTOPRAZOLE SODIUM 40 MG/1
40 FOR SUSPENSION ORAL DAILY
Status: DISCONTINUED | OUTPATIENT
Start: 2022-01-28 | End: 2022-02-02 | Stop reason: HOSPADM

## 2022-01-28 RX ORDER — ATORVASTATIN CALCIUM 20 MG/1
80 TABLET, FILM COATED ORAL DAILY
Status: DISCONTINUED | OUTPATIENT
Start: 2022-01-29 | End: 2022-01-28

## 2022-01-28 RX ORDER — NAPROXEN SODIUM 220 MG/1
81 TABLET, FILM COATED ORAL DAILY
Status: DISCONTINUED | OUTPATIENT
Start: 2022-01-28 | End: 2022-02-02 | Stop reason: HOSPADM

## 2022-01-28 RX ORDER — DILTIAZEM HYDROCHLORIDE 30 MG/1
30 TABLET, FILM COATED ORAL EVERY 6 HOURS
Status: DISCONTINUED | OUTPATIENT
Start: 2022-01-28 | End: 2022-01-29

## 2022-01-28 RX ORDER — METOCLOPRAMIDE HYDROCHLORIDE 5 MG/ML
5 INJECTION INTRAMUSCULAR; INTRAVENOUS EVERY 8 HOURS
Status: DISCONTINUED | OUTPATIENT
Start: 2022-01-28 | End: 2022-01-31

## 2022-01-28 RX ORDER — NAPROXEN SODIUM 220 MG/1
81 TABLET, FILM COATED ORAL DAILY
Status: DISCONTINUED | OUTPATIENT
Start: 2022-01-29 | End: 2022-01-28

## 2022-01-28 RX ORDER — AMIODARONE HYDROCHLORIDE 200 MG/1
200 TABLET ORAL DAILY
Status: DISCONTINUED | OUTPATIENT
Start: 2022-01-28 | End: 2022-02-02 | Stop reason: HOSPADM

## 2022-01-28 RX ORDER — CLOPIDOGREL BISULFATE 75 MG/1
75 TABLET ORAL DAILY
Status: DISCONTINUED | OUTPATIENT
Start: 2022-01-29 | End: 2022-01-28

## 2022-01-28 RX ADMIN — PIPERACILLIN SODIUM AND TAZOBACTAM SODIUM 4.5 G: 4; .5 INJECTION, POWDER, FOR SOLUTION INTRAVENOUS at 04:01

## 2022-01-28 RX ADMIN — DILTIAZEM HYDROCHLORIDE 30 MG: 30 TABLET, FILM COATED ORAL at 11:01

## 2022-01-28 RX ADMIN — CLOPIDOGREL 75 MG: 75 TABLET, FILM COATED ORAL at 10:01

## 2022-01-28 RX ADMIN — INSULIN ASPART 4 UNITS: 100 INJECTION, SOLUTION INTRAVENOUS; SUBCUTANEOUS at 11:01

## 2022-01-28 RX ADMIN — ONDANSETRON 8 MG: 2 INJECTION INTRAMUSCULAR; INTRAVENOUS at 04:01

## 2022-01-28 RX ADMIN — PIPERACILLIN SODIUM AND TAZOBACTAM SODIUM 4.5 G: 4; .5 INJECTION, POWDER, FOR SOLUTION INTRAVENOUS at 08:01

## 2022-01-28 RX ADMIN — INSULIN ASPART 1 UNITS: 100 INJECTION, SOLUTION INTRAVENOUS; SUBCUTANEOUS at 09:01

## 2022-01-28 RX ADMIN — LABETALOL HYDROCHLORIDE 10 MG: 5 INJECTION, SOLUTION INTRAVENOUS at 04:01

## 2022-01-28 RX ADMIN — LOSARTAN POTASSIUM 25 MG: 25 TABLET, FILM COATED ORAL at 10:01

## 2022-01-28 RX ADMIN — SODIUM CHLORIDE 100 MG: 9 INJECTION, SOLUTION INTRAVENOUS at 07:01

## 2022-01-28 RX ADMIN — METOPROLOL TARTRATE 12.5 MG: 25 TABLET, FILM COATED ORAL at 07:01

## 2022-01-28 RX ADMIN — INSULIN ASPART 3 UNITS: 100 INJECTION, SOLUTION INTRAVENOUS; SUBCUTANEOUS at 05:01

## 2022-01-28 RX ADMIN — INSULIN DETEMIR 8 UNITS: 100 INJECTION, SOLUTION SUBCUTANEOUS at 10:01

## 2022-01-28 RX ADMIN — DILTIAZEM HYDROCHLORIDE 30 MG: 30 TABLET, FILM COATED ORAL at 05:01

## 2022-01-28 RX ADMIN — METOPROLOL TARTRATE 12.5 MG: 25 TABLET, FILM COATED ORAL at 10:01

## 2022-01-28 RX ADMIN — METOCLOPRAMIDE 5 MG: 5 INJECTION, SOLUTION INTRAMUSCULAR; INTRAVENOUS at 05:01

## 2022-01-28 RX ADMIN — HYDRALAZINE HYDROCHLORIDE 10 MG: 20 INJECTION INTRAMUSCULAR; INTRAVENOUS at 02:01

## 2022-01-28 RX ADMIN — DOCUSATE SODIUM 50 MG AND SENNOSIDES 8.6 MG 1 TABLET: 8.6; 5 TABLET, FILM COATED ORAL at 10:01

## 2022-01-28 RX ADMIN — AMIODARONE HYDROCHLORIDE 200 MG: 200 TABLET ORAL at 10:01

## 2022-01-28 RX ADMIN — INSULIN ASPART 4 UNITS: 100 INJECTION, SOLUTION INTRAVENOUS; SUBCUTANEOUS at 05:01

## 2022-01-28 RX ADMIN — PIPERACILLIN SODIUM AND TAZOBACTAM SODIUM 4.5 G: 4; .5 INJECTION, POWDER, FOR SOLUTION INTRAVENOUS at 11:01

## 2022-01-28 RX ADMIN — INSULIN ASPART 2 UNITS: 100 INJECTION, SOLUTION INTRAVENOUS; SUBCUTANEOUS at 07:01

## 2022-01-28 RX ADMIN — METOCLOPRAMIDE 5 MG: 5 INJECTION, SOLUTION INTRAMUSCULAR; INTRAVENOUS at 10:01

## 2022-01-28 RX ADMIN — ASPIRIN 81 MG CHEWABLE TABLET 81 MG: 81 TABLET CHEWABLE at 10:01

## 2022-01-28 RX ADMIN — SODIUM CHLORIDE 100 MG: 9 INJECTION, SOLUTION INTRAVENOUS at 09:01

## 2022-01-28 RX ADMIN — INSULIN ASPART 4 UNITS: 100 INJECTION, SOLUTION INTRAVENOUS; SUBCUTANEOUS at 07:01

## 2022-01-28 RX ADMIN — PANTOPRAZOLE SODIUM 40 MG: 40 GRANULE, DELAYED RELEASE ORAL at 10:01

## 2022-01-28 RX ADMIN — DILTIAZEM HYDROCHLORIDE 30 MG: 30 TABLET, FILM COATED ORAL at 12:01

## 2022-01-28 RX ADMIN — ATORVASTATIN CALCIUM 80 MG: 40 TABLET, FILM COATED ORAL at 10:01

## 2022-01-28 NOTE — ASSESSMENT & PLAN NOTE
Profoundly hyperglycemic, acidotic, +beta hydrox  Concern for infarction with positive trop and ecg changes although this may be persistent tropinemia  -s/p insulin drip, K greater than 6 initially   -monitor BG  -IVF  - sugars still uncontrolled, endocrine consult placed  - adjusting insulin regimen per endo recs

## 2022-01-28 NOTE — PLAN OF CARE
[facility] Care Plan    POC reviewed with Kamar Muñoz  at 0300. Pt verbalized understanding. Questions and concerns addressed. No acute events overnight. Neuro status unchanged. Gave labetalol  and hydralazine  to maintained SBP< 140 . Pt progressing toward goals.  Gave bath and changed linens. Heparin gtt still in therapeutic ,15 unit . Will continue to monitor. See below and flowsheets for full assessment and VS info.       Neuro:  Patsy Coma Scale  Best Eye Response: 4-->(E4) spontaneous  Best Motor Response: 6-->(M6) obeys commands  Best Verbal Response: 5-->(V5) oriented  Homer Coma Scale Score: 15  Assessment Qualifiers: patient not sedated/intubated  Pupil PERRLA: yes     24hr Temp:  [97.5 °F (36.4 °C)-99 °F (37.2 °C)]     CV:   Rhythm: normal sinus rhythm  BP goals:   SBP < 140  MAP > 65    Resp:   O2 Device (Oxygen Therapy): nasal cannula       Plan:     GI/:     Diet/Nutrition Received: mechanical/dental soft,consistent carb/diabetic diet  Last Bowel Movement: 01/28/22  Voiding Characteristics: external catheter    Intake/Output Summary (Last 24 hours) at 1/28/2022 0802  Last data filed at 1/28/2022 0757  Gross per 24 hour   Intake 1907.38 ml   Output 1613 ml   Net 294.38 ml          Labs/Accuchecks:  Recent Labs   Lab 01/28/22  0247   WBC 11.32   RBC 4.63   HGB 13.1*   HCT 41.1         Recent Labs   Lab 01/28/22  0247      K 5.1   CO2 24      BUN 24*   CREATININE 1.5*   ALKPHOS 128   ALT 20   AST 45*   BILITOT 0.9      Recent Labs   Lab 01/25/22  2301 01/26/22  0628 01/28/22  0247   INR 1.0  --   --    APTT 21.2   < > 37.6*    < > = values in this interval not displayed.      Recent Labs   Lab 01/26/22  1512   TROPONINI 0.820*       Electrolytes: Electrolytes replaced  Accuchecks: ACHS    Gtts:   heparin (porcine) in D5W 15 Units/kg/hr (01/28/22 0605)       LDA/Wounds:  Lines/Drains/Airways       Drain                   Urethral Catheter 01/25/22 1623 Straight-tip 16 Fr. 2 days               Peripheral Intravenous Line                   Peripheral IV - Single Lumen 01/25/22 1601 20 G Right Antecubital 2 days         Peripheral IV - Single Lumen 01/26/22 0148 22 G Left Hand 2 days         Peripheral IV - Single Lumen 01/28/22 0510 20 G Posterior;Right Forearm <1 day         Peripheral IV - Single Lumen 01/28/22 0520 18 G Left;Posterior Forearm <1 day                  Wounds: Yes  Wound care consulted: Yes

## 2022-01-28 NOTE — PROGRESS NOTES
"[facility]  Endocrinology  Progress Note    Admit Date: 1/25/2022     Mr. Kamar Muñoz is a 76-year-old-man with a history of CAD, T2DM, HLD, HTN, recent RMCA stroke with LSW, admitted for AMS found to have hemorraghic conversion of recent RMCA infarct and DKA; hospital course c/b R focal seizures and NSTEMI. Endocrinology consulted for "after DKA."     Upon arrival . pH 7.216. Bicarb 7. AG 29. BHB 4.8. UA 1+ ketones. Placed on insulin drip on admission, discontinued 1/26 after bicarb >18 and AG closed. Now on SQ aspart 6 U q4 hrs + MDSSI as well as D51/2 NS. Not tolerating diet well due to ongoing nausea, receiving prn zofran. Recent -->316-->250.   On vimpat for focal seizures. On heparin drip for NSTEMI, no chest pain upon evaluation.      Regarding Diabetes Mellitus:  - Initially diagnosed with Type 2 diabetes mellitus before 2004.   - A1c 11.5% on admission   - Current symptoms: nausea   - Denies:  chest pain, Polyuria/polydipsia, abdominal discomfort, numbness/tingling, visual changes, or subjective weight changes  Wt Readings from Last 5 Encounters:   01/26/22 95.2 kg (209 lb 14.1 oz)   01/19/22 89.7 kg (197 lb 12 oz)   01/14/22 90 kg (198 lb 6.6 oz)   01/10/22 90.3 kg (199 lb)   01/05/22 92.1 kg (203 lb 0.7 oz)     - Pertinent factors: admitted with DKA, managed by [facility].   - Denies: history of pancreatitis, recurrent gallstones, daily alcohol use, MEN syndrome, pancreatic tumors, or gastroparesis  - Known diabetic complications: cerebrovascular disease, neuropathy, CAD, UTIs  - Cardiovascular risk factors: advanced age (older than 55 for men, 65 for women), diabetes mellitus, dyslipidemia, hypertension, and male gender    - Current diabetic medications include:   1. Lantus 17 U qhs  2. Aspart 17 U TID with meals   3. Metformin 500 mg 2 times a day     - Patient now on or previously been on a GLP-1 agonist: no  - Current diet: in general, a "healthy" diet    - Current " "glucose monitoring regimen:  1 x/day  - Any episodes of hypoglycemia? no  - Family Hx:  Denies FH of diabetes or thyroid disorder       Diabetes Management Status  - Statin: atorvastatin 80 mg qd  - ACE/ARB: losartan 25 mg qd  - Seen Optometry/Ophthalmology within last 12 months: no    A complete 14 point review of systems was conducted and negative except for what is stated above.       Interval HPI:   Overnight events: -307 on aspart 2-4 U TID with meals, detemir 8U BID. No appetite due to nausea.   Eating:   <25%  Nausea: Yes  Hypoglycemia and intervention: No  Fever: No  TPN and/or TF: No  If yes, type of TF/TPN and rate: n/a    /61 (BP Location: Left arm, Patient Position: Lying)   Pulse 71   Temp 98.2 °F (36.8 °C) (Axillary)   Resp 15   Ht 6' 2" (1.88 m)   Wt 95.2 kg (209 lb 14.1 oz)   SpO2 96%   BMI 26.95 kg/m²     Labs Reviewed and Include    Recent Labs   Lab 01/28/22  0247   *   CALCIUM 7.9*   ALBUMIN 2.2*   PROT 6.4      K 5.1   CO2 24      BUN 24*   CREATININE 1.5*   ALKPHOS 128   ALT 20   AST 45*   BILITOT 0.9     Lab Results   Component Value Date    WBC 11.32 01/28/2022    HGB 13.1 (L) 01/28/2022    HCT 41.1 01/28/2022    MCV 89 01/28/2022     01/28/2022     Recent Labs   Lab 01/25/22  1213   TSH 0.600     Lab Results   Component Value Date    HGBA1C 11.5 (H) 01/26/2022       Nutritional status:   Body mass index is 26.95 kg/m².  Lab Results   Component Value Date    ALBUMIN 2.2 (L) 01/28/2022    ALBUMIN 2.5 (L) 01/27/2022    ALBUMIN 2.4 (L) 01/27/2022     No results found for: PREALBUMIN    Estimated Creatinine Clearance: 47.2 mL/min (A) (based on SCr of 1.5 mg/dL (H)).    Accu-Checks  Recent Labs     01/27/22  0216 01/27/22  0220 01/27/22  0610 01/27/22  0611 01/27/22  0919 01/27/22  1413 01/27/22  1620 01/27/22  1813 01/27/22  2101 01/28/22  0730   POCTGLUCOSE 296* 304* 306* 315* 250* 318* 236* 238* 314* 307*       Current Medications and/or Treatments " Impacting Glycemic Control  Immunotherapy:    Immunosuppressants     None        Steroids:   Hormones (From admission, onward)            None        Pressors:    Autonomic Drugs (From admission, onward)            Start     Stop Route Frequency Ordered    01/28/22 2100  metoprolol tartrate (LOPRESSOR) split tablet 12.5 mg         -- Oral 2 times daily 01/28/22 0930        Hyperglycemia/Diabetes Medications:   Antihyperglycemics (From admission, onward)            Start     Stop Route Frequency Ordered    01/27/22 2100  insulin detemir U-100 pen 8 Units         -- SubQ 2 times daily 01/27/22 1515    01/27/22 1756  insulin aspart U-100 pen 0-5 Units         -- SubQ Before meals & nightly PRN 01/27/22 1656    01/27/22 1526  insulin aspart U-100 pen 2-4 Units         -- SubQ 3 times daily with meals 01/27/22 1526          ASSESSMENT and PLAN    Ketosis-prone diabetes mellitus  Key History and Diagnostic Findings    IDDM2 presents with DKA in the setting of hemorraghic conversion of recent RMCA infarct c/b focal seizures and new NSTEMI.   Started on insulin drip and transitioned to SQ by primary team once AG closed and bicarb >18.  Decreased appetite and nausea limiting meal intake.   Takes 17 U lantus qhs and 17 U aspart TID at home.   Currently on aspart 6 U q4 and MDSSI as well as D5 1/2NS.   -316. AG 9. Bicarb 24.       Recommendations:   - continue aspart from 2-4U TID w meals (2 U if eating <50% of meal, 4 U if eating 100% of meal)  - increase detemir from 8 U BID to 10 U BID  - LDSSI  - diabetic diet and POCT glucose check ACHS    - We will continue to follow along, thank you for this consultation      Jenny Mathias MD  Endocrinology  [facility]

## 2022-01-28 NOTE — PROGRESS NOTES
[facility] ([facility])  Neurocritical Care  Progress Note    Admit Date: 1/25/2022  Service Date: 01/28/2022  Length of Stay: 3    Subjective:     Chief Complaint: Encephalopathy, metabolic    History of Present Illness: Kamar Muñoz is a 76-year-old male with a history of CAD, type 2 diabetes, hyperlipidemia, hypertension, recent stroke without residual deficits who presenting with slurred speech, left facial droop and left-sided weakness via EMS.  Initially pt had a change in mental status and vomiting, also noted to have slurred speech, LKN was reportedly 8am. Noted to  have a BG greater than 400 at the seen. On arrival to ED symptoms continued to worsen, on imaging noted to have some subacute hemorrhagic conversion from old infarct. He also c/o chest pain similar to his recent ACS, but EKG & Trop, cardiology consulted  & recommended started ACS protocol: start heparin gtt, reload with plavix 300mg x1 and then 75mg qD, and continue 81mg ASA. Since patient has been on Eliquis, stroke team recommended is safe to start heparin drip. Patient was also on insulin drip for DKA. Contacted MICU team this AM to discuss management plan for the patient, and agreed that would be better to transfer to NCC service given requirement for close NCC monitorng while starting Heparin drip in context cerebral bleed.      Hospital Course: 01/27/2022 Pt transitioned off of insuline drip yesterday to subcu. placed on EEG and found to be having right frontal focal seizures. Loaded with vimpat and started on maintenance dose. Has been seizure free for 24 hours. Sugars have remained uncontrolled so endocrine consult was placed. Pt otherwise ok to step down to the floor. Hospital medicine transfer initiated.   01/28/2022 pt still hyperglycemic overnight, insulin regimen being adjusted per endo recs. Also scheduling reglan to improve pts N/V. Otherwise stable and pt stepping down to floor with hospital medicine.            Interval History:  pt still hyperglycemic overnight, insulin regimen being adjusted per endo recs. Also scheduling reglan to improve pts N/V. Otherwise stable and pt stepping down to floor with hospital medicine. heparin gtt top be stopped early irvin morning per cards and start 5mg BID el;iquis for AC    Review of Systems   Constitutional: Negative.    HENT: Negative.    Respiratory: Negative.    Cardiovascular: Negative.    Gastrointestinal: Negative.    Endocrine: Negative.    Genitourinary: Negative.    Musculoskeletal: Negative.    Neurological: Positive for headaches.   Hematological: Negative.    Psychiatric/Behavioral: Negative.        Objective:     Vitals:  Temp: 98.7 °F (37.1 °C)  Pulse: 71  Rhythm: normal sinus rhythm  BP: 128/63  MAP (mmHg): 90  Resp: 18  SpO2: 95 %  O2 Device (Oxygen Therapy): room air    Temp  Min: 97.5 °F (36.4 °C)  Max: 99 °F (37.2 °C)  Pulse  Min: 70  Max: 77  BP  Min: 109/59  Max: 147/76  MAP (mmHg)  Min: 81  Max: 103  Resp  Min: 14  Max: 23  SpO2  Min: 82 %  Max: 98 %    01/27 0701 - 01/28 0700  In: 1993.3 [P.O.:680; I.V.:708.6]  Out: 1463 [Urine:1463]   Unmeasured Output  Stool Occurrence: 0       Physical Exam  Constitutional:       General: He is not in acute distress.     Appearance: Normal appearance. He is normal weight. He is ill-appearing.   HENT:      Head: Normocephalic and atraumatic.      Nose: Nose normal.   Eyes:      Extraocular Movements: Extraocular movements intact.      Conjunctiva/sclera: Conjunctivae normal.      Pupils: Pupils are equal, round, and reactive to light.   Cardiovascular:      Rate and Rhythm: Normal rate and regular rhythm.      Pulses: Normal pulses.      Heart sounds: Normal heart sounds.   Pulmonary:      Effort: Pulmonary effort is normal. No respiratory distress.      Breath sounds: Normal breath sounds.   Abdominal:      General: Abdomen is flat. There is no distension.      Palpations: Abdomen is soft.      Tenderness: There is  no abdominal tenderness.   Musculoskeletal:         General: Normal range of motion.   Skin:     General: Skin is warm and dry.      Capillary Refill: Capillary refill takes 2 to 3 seconds.   Neurological:      General: No focal deficit present.      Mental Status: He is alert and oriented to person, place, and time.      Cranial Nerves: No cranial nerve deficit.      Sensory: No sensory deficit.   Psychiatric:         Mood and Affect: Mood normal.         Behavior: Behavior normal.         Thought Content: Thought content normal.           Medications:  Continuousheparin (porcine) in D5W, Last Rate: 15 Units/kg/hr (01/28/22 0922)    Scheduledamiodarone, 200 mg, Daily  aspirin, 81 mg, Daily  atorvastatin, 80 mg, Daily  clopidogreL, 75 mg, Daily  diltiaZEM, 30 mg, Q6H  insulin aspart U-100, 2-4 Units, TIDWM  insulin detemir U-100, 10 Units, BID  lacosamide (VIMPAT) IVPB, 100 mg, Q12H  losartan, 25 mg, Daily  metoclopramide HCl, 5 mg, Q8H  metoprolol tartrate, 12.5 mg, BID  pantoprazole, 40 mg, Daily  piperacillin-tazobactam (ZOSYN) IVPB, 4.5 g, Q8H  senna-docusate 8.6-50 mg, 1 tablet, Daily    PRNdextrose 50%, 12.5 g, PRN  dextrose 50%, 25 g, PRN  glucagon (human recombinant), 1 mg, PRN  glucose, 16 g, PRN  glucose, 24 g, PRN  hydrALAZINE, 10 mg, Q6H PRN  insulin aspart U-100, 0-5 Units, QID (AC + HS) PRN  labetalol, 10 mg, Q6H PRN  magnesium sulfate IVPB, 2 g, PRN  magnesium sulfate IVPB, 4 g, PRN  nitroGLYCERIN, 0.4 mg, Q5 Min PRN  phenylephrine HCL 0.25%, 1 spray, Q6H PRN  potassium chloride in water, 40 mEq, PRN   And  potassium chloride in water, 40 mEq, PRN   And  potassium chloride in water, 40 mEq, PRN  potassium, sodium phosphates, 2 packet, PRN  potassium, sodium phosphates, 2 packet, PRN  potassium, sodium phosphates, 2 packet, PRN  promethazine (PHENERGAN) IVPB, 12.5 mg, Q8H PRN  sodium chloride 0.9%, 10 mL, PRN      Today I personally reviewed pertinent medications, lines/drains/airways, imaging,  cardiology results, laboratory results, microbiology results, notably:    Diet  Diet diabetic [facility]; 2800 Calorie; Cardiac (Low Na/Chol), Dysphagia Mechanical Soft (IDDSI Level 5); Isolation Tray - Regular [place]; Thin  Diet diabetic [facility]; 2800 Calorie; Cardiac (Low Na/Chol), Dysphagia Mechanical Soft (IDDSI Level 5); Isolation Tray - Regular [place]; Thin          Assessment/Plan:     Neuro  * Encephalopathy, metabolic  likely related to DKA, critical illness may be causing re expression of stroke  - EEG  - Concern for infectious process triggering AMS/DKA  - mental status much improved, has been seizure free for over 24 hours. Continue Vimpat per epilepsy.   - stepping down to hospital medicine today        Focal seizures  Pt placed on EEG yesterday and found to be having right frontal focal seizures.   Loaded with Vimpat and now on 100mg BID.   Seizure free for 24 hours     Cardiac/Vascular  NSTEMI (non-ST elevated myocardial infarction)  [facility] on 1/9/22 with two vessel coronary artery disease with 100% occlusion of mid RCA and 70% stenosis of proximal LAD., RCA stent x2  - trop 0.143 today, no active chest pain, ecg with new LBBB and TWI in inferior leads, repeat trop/ecg pending  - trop was 12 twelve days ago  - trend trop/ecg  - Heparin gtt to stop at 0100 1/29/22 and start Eliquis 5mg BID for AC irvin     Coronary artery disease involving native heart without angina pectoris  Resume DAPT/statin   Heparin gtt to stop irvin AM and will start oral eliquis     Paroxysmal atrial fibrillation  Metoprolol, amio, dilt  - Anticoagulation     Hypertension associated with diabetes  SBP<140    Renal/  Hyperkalemia  K of 6.2, did not shift in the setting of insulin drip and DKA  - monitor CMP    BRENDAN (acute kidney injury)  Baseline Cr 1  - 3.2 on arrival  - monitor s.cr & I&O  - lopez placed  - Avoid nephrotoxins    Endocrine  Ketosis-prone diabetes mellitus  Profoundly hyperglycemic, acidotic, +beta  hydrox  Concern for infarction with positive trop and ecg changes although this may be persistent tropinemia  -s/p insulin drip, K greater than 6 initially   -monitor BG  -IVF  - sugars still uncontrolled, endocrine consult placed  - adjusting insulin regimen per endo recs     Type 2 diabetes mellitus with diabetic peripheral angiopathy without gangrene, with long-term current use of insulin  Sugars still uncontrolled. See DKA          The patient is being Prophylaxed for:  Venous Thromboembolism with: Mechanical or Chemical  Stress Ulcer with: PPI  Ventilator Pneumonia with: not applicable    Activity Orders          Diet diabetic [facility]; 2800 Calorie; Cardiac (Low Na/Chol), Dysphagia Mechanical Soft (IDDSI Level 5); Isolation Tray - Regular [place]; Thin: Diabetic starting at 01/27 0934    Turn patient every 2 hours starting at 01/26 1400        Full Code    Loree Ramírez MD  Neurocritical Care  [facility] (Hospital)

## 2022-01-28 NOTE — SUBJECTIVE & OBJECTIVE
"Interval HPI:   Overnight events: -307 on aspart 2-4 U TID with meals, detemir 8U BID. No appetite due to nausea.   Eating:   <25%  Nausea: Yes  Hypoglycemia and intervention: No  Fever: No  TPN and/or TF: No  If yes, type of TF/TPN and rate: n/a    /61 (BP Location: Left arm, Patient Position: Lying)   Pulse 71   Temp 98.2 °F (36.8 °C) (Axillary)   Resp 15   Ht 6' 2" (1.88 m)   Wt 95.2 kg (209 lb 14.1 oz)   SpO2 96%   BMI 26.95 kg/m²     Labs Reviewed and Include    Recent Labs   Lab 01/28/22  0247   *   CALCIUM 7.9*   ALBUMIN 2.2*   PROT 6.4      K 5.1   CO2 24      BUN 24*   CREATININE 1.5*   ALKPHOS 128   ALT 20   AST 45*   BILITOT 0.9     Lab Results   Component Value Date    WBC 11.32 01/28/2022    HGB 13.1 (L) 01/28/2022    HCT 41.1 01/28/2022    MCV 89 01/28/2022     01/28/2022     Recent Labs   Lab 01/25/22  1213   TSH 0.600     Lab Results   Component Value Date    HGBA1C 11.5 (H) 01/26/2022       Nutritional status:   Body mass index is 26.95 kg/m².  Lab Results   Component Value Date    ALBUMIN 2.2 (L) 01/28/2022    ALBUMIN 2.5 (L) 01/27/2022    ALBUMIN 2.4 (L) 01/27/2022     No results found for: PREALBUMIN    Estimated Creatinine Clearance: 47.2 mL/min (A) (based on SCr of 1.5 mg/dL (H)).    Accu-Checks  Recent Labs     01/27/22  0216 01/27/22  0220 01/27/22  0610 01/27/22  0611 01/27/22  0919 01/27/22  1413 01/27/22  1620 01/27/22  1813 01/27/22  2101 01/28/22  0730   POCTGLUCOSE 296* 304* 306* 315* 250* 318* 236* 238* 314* 307*       Current Medications and/or Treatments Impacting Glycemic Control  Immunotherapy:    Immunosuppressants     None        Steroids:   Hormones (From admission, onward)            None        Pressors:    Autonomic Drugs (From admission, onward)            Start     Stop Route Frequency Ordered    01/28/22 2100  metoprolol tartrate (LOPRESSOR) split tablet 12.5 mg         -- Oral 2 times daily 01/28/22 0930    "     Hyperglycemia/Diabetes Medications:   Antihyperglycemics (From admission, onward)            Start     Stop Route Frequency Ordered    01/27/22 2100  insulin detemir U-100 pen 8 Units         -- SubQ 2 times daily 01/27/22 1515    01/27/22 1756  insulin aspart U-100 pen 0-5 Units         -- SubQ Before meals & nightly PRN 01/27/22 1656    01/27/22 1526  insulin aspart U-100 pen 2-4 Units         -- SubQ 3 times daily with meals 01/27/22 1526

## 2022-01-28 NOTE — PT/OT/SLP PROGRESS
Speech Language Pathology      Floydherberth Muñoz  MRN: [id number]    Patient not seen today secondary to Other (Comment) (pt. did not want to eat - partial plates not present). Will follow-up 1/31.

## 2022-01-28 NOTE — SUBJECTIVE & OBJECTIVE
Interval History:  pt still hyperglycemic overnight, insulin regimen being adjusted per endo recs. Also scheduling reglan to improve pts N/V. Otherwise stable and pt stepping down to floor with hospital medicine. heparin gtt top be stopped early irvin morning per cards and start 5mg BID el;iquis for AC    Review of Systems   Constitutional: Negative.    HENT: Negative.    Respiratory: Negative.    Cardiovascular: Negative.    Gastrointestinal: Negative.    Endocrine: Negative.    Genitourinary: Negative.    Musculoskeletal: Negative.    Neurological: Positive for headaches.   Hematological: Negative.    Psychiatric/Behavioral: Negative.        Objective:     Vitals:  Temp: 98.7 °F (37.1 °C)  Pulse: 71  Rhythm: normal sinus rhythm  BP: 128/63  MAP (mmHg): 90  Resp: 18  SpO2: 95 %  O2 Device (Oxygen Therapy): room air    Temp  Min: 97.5 °F (36.4 °C)  Max: 99 °F (37.2 °C)  Pulse  Min: 70  Max: 77  BP  Min: 109/59  Max: 147/76  MAP (mmHg)  Min: 81  Max: 103  Resp  Min: 14  Max: 23  SpO2  Min: 82 %  Max: 98 %    01/27 0701 - 01/28 0700  In: 1993.3 [P.O.:680; I.V.:708.6]  Out: 1463 [Urine:1463]   Unmeasured Output  Stool Occurrence: 0       Physical Exam  Constitutional:       General: He is not in acute distress.     Appearance: Normal appearance. He is normal weight. He is ill-appearing.   HENT:      Head: Normocephalic and atraumatic.      Nose: Nose normal.   Eyes:      Extraocular Movements: Extraocular movements intact.      Conjunctiva/sclera: Conjunctivae normal.      Pupils: Pupils are equal, round, and reactive to light.   Cardiovascular:      Rate and Rhythm: Normal rate and regular rhythm.      Pulses: Normal pulses.      Heart sounds: Normal heart sounds.   Pulmonary:      Effort: Pulmonary effort is normal. No respiratory distress.      Breath sounds: Normal breath sounds.   Abdominal:      General: Abdomen is flat. There is no distension.      Palpations: Abdomen is soft.      Tenderness: There is no  abdominal tenderness.   Musculoskeletal:         General: Normal range of motion.   Skin:     General: Skin is warm and dry.      Capillary Refill: Capillary refill takes 2 to 3 seconds.   Neurological:      General: No focal deficit present.      Mental Status: He is alert and oriented to person, place, and time.      Cranial Nerves: No cranial nerve deficit.      Sensory: No sensory deficit.   Psychiatric:         Mood and Affect: Mood normal.         Behavior: Behavior normal.         Thought Content: Thought content normal.           Medications:  Continuousheparin (porcine) in D5W, Last Rate: 15 Units/kg/hr (01/28/22 0922)    Scheduledamiodarone, 200 mg, Daily  aspirin, 81 mg, Daily  atorvastatin, 80 mg, Daily  clopidogreL, 75 mg, Daily  diltiaZEM, 30 mg, Q6H  insulin aspart U-100, 2-4 Units, TIDWM  insulin detemir U-100, 10 Units, BID  lacosamide (VIMPAT) IVPB, 100 mg, Q12H  losartan, 25 mg, Daily  metoclopramide HCl, 5 mg, Q8H  metoprolol tartrate, 12.5 mg, BID  pantoprazole, 40 mg, Daily  piperacillin-tazobactam (ZOSYN) IVPB, 4.5 g, Q8H  senna-docusate 8.6-50 mg, 1 tablet, Daily    PRNdextrose 50%, 12.5 g, PRN  dextrose 50%, 25 g, PRN  glucagon (human recombinant), 1 mg, PRN  glucose, 16 g, PRN  glucose, 24 g, PRN  hydrALAZINE, 10 mg, Q6H PRN  insulin aspart U-100, 0-5 Units, QID (AC + HS) PRN  labetalol, 10 mg, Q6H PRN  magnesium sulfate IVPB, 2 g, PRN  magnesium sulfate IVPB, 4 g, PRN  nitroGLYCERIN, 0.4 mg, Q5 Min PRN  phenylephrine HCL 0.25%, 1 spray, Q6H PRN  potassium chloride in water, 40 mEq, PRN   And  potassium chloride in water, 40 mEq, PRN   And  potassium chloride in water, 40 mEq, PRN  potassium, sodium phosphates, 2 packet, PRN  potassium, sodium phosphates, 2 packet, PRN  potassium, sodium phosphates, 2 packet, PRN  promethazine (PHENERGAN) IVPB, 12.5 mg, Q8H PRN  sodium chloride 0.9%, 10 mL, PRN      Today I personally reviewed pertinent medications, lines/drains/airways, imaging, cardiology  results, laboratory results, microbiology results, notably:    Diet  Diet diabetic [facility]; 2800 Calorie; Cardiac (Low Na/Chol), Dysphagia Mechanical Soft (IDDSI Level 5); Isolation Tray - Regular [place]; Thin  Diet diabetic [facility]; 2800 Calorie; Cardiac (Low Na/Chol), Dysphagia Mechanical Soft (IDDSI Level 5); Isolation Tray - Regular [place]; Thin

## 2022-01-28 NOTE — ASSESSMENT & PLAN NOTE
Key History and Diagnostic Findings    IDDM2 presents with DKA in the setting of hemorraghic conversion of recent RMCA infarct c/b focal seizures and new NSTEMI.   Started on insulin drip and transitioned to SQ by primary team once AG closed and bicarb >18.  Decreased appetite and nausea limiting meal intake.   Takes 17 U lantus qhs and 17 U aspart TID at home.   Currently on aspart 6 U q4 and MDSSI as well as D5 1/2NS.   -316. AG 9. Bicarb 24.       Recommendations:   - continue aspart from 2-4U TID w meals (2 U if eating <50% of meal, 4 U if eating 100% of meal)  - increase detemir from 8 U BID to 10 U BID  - LDSSI  - diabetic diet and POCT glucose check ACHS    - We will continue to follow along, thank you for this consultation

## 2022-01-28 NOTE — ASSESSMENT & PLAN NOTE
likely related to DKA, critical illness may be causing re expression of stroke  - EEG  - Concern for infectious process triggering AMS/DKA  - mental status much improved, has been seizure free for over 24 hours. Continue Vimpat per epilepsy.   - stepping down to hospital medicine today

## 2022-01-28 NOTE — NURSING TRANSFER
Nursing Transfer Note      1/28/2022     Reason patient is being transferred: step down    Transfer To:     Transfer via bed    Transfer with cardiac monitoring    Transported by RN, PCT    Medicines sent: Heparin gtt, Zosyn, insulin pens    Any special needs or follow-up needed: N/A    Chart send with patient: Yes    Notified: spouse (Aimee; left message on cell)     Patient reassessed at: 1/28/22 at 1050    Upon arrival to floor: cardiac monitor applied, patient oriented to room, call bell in reach and bed in lowest position    All pt belongings sent with patient in clear bag.

## 2022-01-28 NOTE — ASSESSMENT & PLAN NOTE
[facility] on 1/9/22 with two vessel coronary artery disease with 100% occlusion of mid RCA and 70% stenosis of proximal LAD., RCA stent x2  - trop 0.143 today, no active chest pain, ecg with new LBBB and TWI in inferior leads, repeat trop/ecg pending  - trop was 12 twelve days ago  - trend trop/ecg  - Heparin gtt to stop at 0100 1/29/22 and start Eliquis 5mg BID for AC irvin

## 2022-01-29 PROBLEM — R11.2 NAUSEA & VOMITING: Status: ACTIVE | Noted: 2022-01-29

## 2022-01-29 PROBLEM — I21.11 STEMI INVOLVING RIGHT CORONARY ARTERY: Status: RESOLVED | Noted: 2022-01-09 | Resolved: 2022-01-29

## 2022-01-29 PROBLEM — E11.11 DIABETIC KETOACIDOSIS WITH COMA ASSOCIATED WITH TYPE 2 DIABETES MELLITUS: Status: ACTIVE | Noted: 2022-01-29

## 2022-01-29 PROBLEM — E11.00 HYPEROSMOLAR HYPERGLYCEMIC STATE (HHS): Status: ACTIVE | Noted: 2022-01-29

## 2022-01-29 PROBLEM — R65.10 SIRS DUE TO NON-INFECTIOUS PROCESS WITHOUT ACUTE ORGAN DYSFUNCTION: Status: ACTIVE | Noted: 2022-01-29

## 2022-01-29 LAB
APTT BLDCRRT: 27.3 SEC (ref 21–32)
MAGNESIUM SERPL-MCNC: 1.9 MG/DL (ref 1.6–2.6)
POCT GLUCOSE: 192 MG/DL (ref 70–110)
POCT GLUCOSE: 239 MG/DL (ref 70–110)
POCT GLUCOSE: 328 MG/DL (ref 70–110)
POCT GLUCOSE: 375 MG/DL (ref 70–110)

## 2022-01-29 PROCEDURE — 25000003 PHARM REV CODE 250

## 2022-01-29 PROCEDURE — 11000001 HC ACUTE MED/SURG PRIVATE ROOM

## 2022-01-29 PROCEDURE — 99232 PR SUBSEQUENT HOSPITAL CARE,LEVL II: ICD-10-PCS | Mod: GC,,, | Performed by: INTERNAL MEDICINE

## 2022-01-29 PROCEDURE — 99356 PR PROLONGED SERV,INPATIENT,1ST HR: CPT | Mod: ,,, | Performed by: INTERNAL MEDICINE

## 2022-01-29 PROCEDURE — C9254 INJECTION, LACOSAMIDE: HCPCS | Performed by: PSYCHIATRY & NEUROLOGY

## 2022-01-29 PROCEDURE — 94761 N-INVAS EAR/PLS OXIMETRY MLT: CPT

## 2022-01-29 PROCEDURE — 99233 PR SUBSEQUENT HOSPITAL CARE,LEVL III: ICD-10-PCS | Mod: ,,, | Performed by: INTERNAL MEDICINE

## 2022-01-29 PROCEDURE — 83735 ASSAY OF MAGNESIUM: CPT | Performed by: STUDENT IN AN ORGANIZED HEALTH CARE EDUCATION/TRAINING PROGRAM

## 2022-01-29 PROCEDURE — 63600175 PHARM REV CODE 636 W HCPCS: Performed by: PSYCHIATRY & NEUROLOGY

## 2022-01-29 PROCEDURE — 99233 SBSQ HOSP IP/OBS HIGH 50: CPT | Mod: ,,, | Performed by: INTERNAL MEDICINE

## 2022-01-29 PROCEDURE — 25000003 PHARM REV CODE 250: Performed by: INTERNAL MEDICINE

## 2022-01-29 PROCEDURE — 25000003 PHARM REV CODE 250: Performed by: PSYCHIATRY & NEUROLOGY

## 2022-01-29 PROCEDURE — 99232 SBSQ HOSP IP/OBS MODERATE 35: CPT | Mod: GC,,, | Performed by: INTERNAL MEDICINE

## 2022-01-29 PROCEDURE — 99356 PR PROLONGED SERV,INPATIENT,1ST HR: ICD-10-PCS | Mod: ,,, | Performed by: INTERNAL MEDICINE

## 2022-01-29 PROCEDURE — 85730 THROMBOPLASTIN TIME PARTIAL: CPT | Performed by: PSYCHIATRY & NEUROLOGY

## 2022-01-29 RX ORDER — METOPROLOL TARTRATE 25 MG/1
12.5 TABLET ORAL 4 TIMES DAILY
Status: COMPLETED | OUTPATIENT
Start: 2022-01-29 | End: 2022-01-29

## 2022-01-29 RX ORDER — METOPROLOL TARTRATE 25 MG/1
50 TABLET, FILM COATED ORAL 4 TIMES DAILY
Status: DISCONTINUED | OUTPATIENT
Start: 2022-01-29 | End: 2022-01-29

## 2022-01-29 RX ORDER — DILTIAZEM HYDROCHLORIDE 30 MG/1
30 TABLET, FILM COATED ORAL EVERY 6 HOURS
Status: COMPLETED | OUTPATIENT
Start: 2022-01-30 | End: 2022-01-29

## 2022-01-29 RX ORDER — METOPROLOL SUCCINATE 50 MG/1
50 TABLET, EXTENDED RELEASE ORAL DAILY
Status: DISCONTINUED | OUTPATIENT
Start: 2022-01-30 | End: 2022-02-02 | Stop reason: HOSPADM

## 2022-01-29 RX ORDER — DILTIAZEM HYDROCHLORIDE 120 MG/1
120 CAPSULE, COATED, EXTENDED RELEASE ORAL DAILY
Status: DISCONTINUED | OUTPATIENT
Start: 2022-01-30 | End: 2022-02-02 | Stop reason: HOSPADM

## 2022-01-29 RX ORDER — INSULIN ASPART 100 [IU]/ML
4-6 INJECTION, SOLUTION INTRAVENOUS; SUBCUTANEOUS
Status: DISCONTINUED | OUTPATIENT
Start: 2022-01-29 | End: 2022-01-30

## 2022-01-29 RX ORDER — METOPROLOL TARTRATE 25 MG/1
25 TABLET, FILM COATED ORAL 4 TIMES DAILY
Status: DISCONTINUED | OUTPATIENT
Start: 2022-01-29 | End: 2022-01-29

## 2022-01-29 RX ORDER — ALUMINUM HYDROXIDE, MAGNESIUM HYDROXIDE, AND SIMETHICONE 2400; 240; 2400 MG/30ML; MG/30ML; MG/30ML
30 SUSPENSION ORAL EVERY 6 HOURS PRN
Status: DISCONTINUED | OUTPATIENT
Start: 2022-01-30 | End: 2022-02-02 | Stop reason: HOSPADM

## 2022-01-29 RX ADMIN — ATORVASTATIN CALCIUM 80 MG: 40 TABLET, FILM COATED ORAL at 08:01

## 2022-01-29 RX ADMIN — DILTIAZEM HYDROCHLORIDE 30 MG: 30 TABLET, FILM COATED ORAL at 05:01

## 2022-01-29 RX ADMIN — DILTIAZEM HYDROCHLORIDE 30 MG: 30 TABLET, FILM COATED ORAL at 11:01

## 2022-01-29 RX ADMIN — ASPIRIN 81 MG CHEWABLE TABLET 81 MG: 81 TABLET CHEWABLE at 08:01

## 2022-01-29 RX ADMIN — SODIUM CHLORIDE 100 MG: 9 INJECTION, SOLUTION INTRAVENOUS at 08:01

## 2022-01-29 RX ADMIN — METOPROLOL TARTRATE 12.5 MG: 25 TABLET, FILM COATED ORAL at 07:01

## 2022-01-29 RX ADMIN — INSULIN ASPART 4 UNITS: 100 INJECTION, SOLUTION INTRAVENOUS; SUBCUTANEOUS at 07:01

## 2022-01-29 RX ADMIN — SODIUM CHLORIDE 100 MG: 9 INJECTION, SOLUTION INTRAVENOUS at 11:01

## 2022-01-29 RX ADMIN — APIXABAN 5 MG: 5 TABLET, FILM COATED ORAL at 07:01

## 2022-01-29 RX ADMIN — METOPROLOL TARTRATE 12.5 MG: 25 TABLET, FILM COATED ORAL at 08:01

## 2022-01-29 RX ADMIN — PIPERACILLIN SODIUM AND TAZOBACTAM SODIUM 4.5 G: 4; .5 INJECTION, POWDER, FOR SOLUTION INTRAVENOUS at 02:01

## 2022-01-29 RX ADMIN — INSULIN ASPART 6 UNITS: 100 INJECTION, SOLUTION INTRAVENOUS; SUBCUTANEOUS at 01:01

## 2022-01-29 RX ADMIN — APIXABAN 5 MG: 5 TABLET, FILM COATED ORAL at 08:01

## 2022-01-29 RX ADMIN — PIPERACILLIN SODIUM AND TAZOBACTAM SODIUM 4.5 G: 4; .5 INJECTION, POWDER, FOR SOLUTION INTRAVENOUS at 07:01

## 2022-01-29 RX ADMIN — INSULIN ASPART 6 UNITS: 100 INJECTION, SOLUTION INTRAVENOUS; SUBCUTANEOUS at 04:01

## 2022-01-29 RX ADMIN — LOSARTAN POTASSIUM 25 MG: 25 TABLET, FILM COATED ORAL at 08:01

## 2022-01-29 RX ADMIN — AMIODARONE HYDROCHLORIDE 200 MG: 200 TABLET ORAL at 08:01

## 2022-01-29 RX ADMIN — INSULIN ASPART 2 UNITS: 100 INJECTION, SOLUTION INTRAVENOUS; SUBCUTANEOUS at 04:01

## 2022-01-29 RX ADMIN — CLOPIDOGREL 75 MG: 75 TABLET, FILM COATED ORAL at 08:01

## 2022-01-29 RX ADMIN — PANTOPRAZOLE SODIUM 40 MG: 40 GRANULE, DELAYED RELEASE ORAL at 08:01

## 2022-01-29 RX ADMIN — DILTIAZEM HYDROCHLORIDE 30 MG: 30 TABLET, FILM COATED ORAL at 04:01

## 2022-01-29 RX ADMIN — PIPERACILLIN SODIUM AND TAZOBACTAM SODIUM 4.5 G: 4; .5 INJECTION, POWDER, FOR SOLUTION INTRAVENOUS at 10:01

## 2022-01-29 RX ADMIN — DOCUSATE SODIUM 50 MG AND SENNOSIDES 8.6 MG 1 TABLET: 8.6; 5 TABLET, FILM COATED ORAL at 08:01

## 2022-01-29 RX ADMIN — INSULIN ASPART 5 UNITS: 100 INJECTION, SOLUTION INTRAVENOUS; SUBCUTANEOUS at 01:01

## 2022-01-29 NOTE — ASSESSMENT & PLAN NOTE
- management per primary team  - currently on losartan 25 mg daily  - further medications with anti-hypertensive on his regime include metoprolol tartrate 12.5 mg twice daily, cardizem 30 mg every 6 hours and amiodarone 200 mg daily

## 2022-01-29 NOTE — ASSESSMENT & PLAN NOTE
Key History and Diagnostic Findings  -Patient presented with Diabetic Ketoacidosis in the setting of hemorraghic conversion of recent RMCA infarct complicated by focal seizures and new NSTEMI.   -Started on insulin drip and transitioned to subcutaneous insulin by primary team once diabetic ketoacidosis resolved.  -Currently decreased appetite and nausea limiting meal intake.   -Patient takes 17 U lantus qhs and 17 U aspart TID at home.      Recommendations:   - continue aspart from 2-4 Units three times a day before meals (2 Units if eating < 50% of meal, 4 Units if eating 100% of meal) with low dose correction scale  - continue insulin levamir 10 Units twice daily  - diabetic diet and POCT glucose check ACHS

## 2022-01-29 NOTE — NURSING
POC reviewed with pt and pt verbalized understanding. Questions/Concerns addressed. A&Ox3. Calm/cooperative. NADN. Respirations equal and unlabored. Bed in low position, side rails up x3.  Safety/Fall precautions in place per facility protocol for safety. Instructed pt to press call bell for assistance if needed pt verbalized understanding. VS stable. Neuro assessment completed see assessment. Will continue to monitor closely throughout this 0164-0311 nightshift Safety maintained. Call bell in reach. Bed alarm activated for safety. Took night medications swallows without difficulty. Pts appetite is poor. Has condom catheter on to  bag to gravity no complications. Bedrest turn Q 2 hours for pressure injury prevention. Telemetry monitor intact for monitoring via war room.

## 2022-01-29 NOTE — PROGRESS NOTES
Hospital Medicine  Progress note    Team: [facility] [place] [facility]OSP MED Z Adrienne Albright MD  Admit Date: 1/25/2022  ALLIE 2/1/2022  Code status: Full Code    Principal Problem:  Encephalopathy, metabolic    Interval hx: No new events    ROS   Respiratory: neg for cough neg for shortness of breath  Cardiovascular: neg for chest pain neg for palpitations  Gastrointestinal: neg for nausea neg for vomiting, neg for abdominal pain neg for diarrhea neg for constipation   Behavioral/Psych: neg for depression neg for anxiety    PEx  Temp:  [97.8 °F (36.6 °C)-98.7 °F (37.1 °C)]   Pulse:  []   Resp:  [17-18]   BP: (114-151)/(58-72)   SpO2:  [93 %-96 %]     Intake/Output Summary (Last 24 hours) at 1/29/2022 1659  Last data filed at 1/29/2022 0400  Gross per 24 hour   Intake 240 ml   Output 600 ml   Net -360 ml     General Appearance: no acute distress   Heart: regular rate and rhythm, no heave  Respiratory: Normal respiratory effort, symmetric excursion, bilateral vesicular breath sounds   Abdomen: Soft, non-tender; bowel sounds active  Skin: intact, no rash, no ulcers  Neurologic:  No focal numbness or weakness  Mental status: Alert, oriented x 4, affect appropriate     Recent Labs   Lab 01/25/22  1213 01/25/22  1216 01/25/22  2137 01/27/22  0349 01/28/22  0247   WBC 15.48*  --   --  14.87* 11.32   HGB 13.6*  --   --  13.1* 13.1*   HCT 45.5   < > 38 40.1 41.1     --   --  316 252    < > = values in this interval not displayed.     Recent Labs   Lab 01/26/22  0329 01/27/22  0019 01/27/22  0019 01/27/22  0349 01/27/22  0349 01/27/22  0627 01/27/22  1623 01/27/22  1931 01/28/22  0247 01/29/22  0712   NA  --  144  --   --   --  143  --   --  140  --    K  --  3.0*   < >  --   --  3.4* 3.2*  --  5.1  --    CL  --  107  --   --   --  105  --   --  106  --    CO2  --  23  --   --   --  24  --   --  24  --    BUN  --  32*  --   --   --  29*  --   --  24*  --    CREATININE  --  2.1*  --   --   --  1.7*  --   --  1.5*  --    GLU  --  224*  --    --   --  316*  --   --  291*  --    CALCIUM  --  8.1*  --   --   --  8.3*  --   --  7.9*  --    MG   < >  --   --  1.7   < >  --   --  1.8 2.1 1.9   PHOS  --   --   --  3.0  --   --   --   --  2.2*  --     < > = values in this interval not displayed.     Recent Labs   Lab 01/25/22  2301 01/26/22  0329 01/27/22  0019 01/27/22  0627 01/28/22  0247   ALKPHOS  --    < > 113 120 128   ALT  --    < > 19 22 20   AST  --    < > 47* 49* 45*   ALBUMIN  --    < > 2.4* 2.5* 2.2*   PROT  --    < > 5.9* 6.4 6.4   BILITOT  --    < > 0.5 0.7 0.9   INR 1.0  --   --   --   --     < > = values in this interval not displayed.        Recent Labs   Lab 01/28/22  1121 01/28/22  1704 01/28/22  2115 01/29/22  0725 01/29/22  1351 01/29/22  1636   POCTGLUCOSE 281* 245* 263* 328* 375* 239*       Scheduled Meds:   amiodarone  200 mg Oral Daily    apixaban  5 mg Oral BID    aspirin  81 mg Oral Daily    atorvastatin  80 mg Oral Daily    clopidogreL  75 mg Oral Daily    [START ON 1/30/2022] diltiaZEM  120 mg Oral Daily    [START ON 1/30/2022] diltiaZEM  30 mg Oral Q6H    insulin aspart U-100  4-6 Units Subcutaneous TIDWM    insulin detemir U-100  10 Units Subcutaneous BID    lacosamide (VIMPAT) IVPB  100 mg Intravenous Q12H    losartan  25 mg Oral Daily    metoclopramide HCl  5 mg Intravenous Q8H    [START ON 1/30/2022] metoprolol succinate  50 mg Oral Daily    metoprolol tartrate  12.5 mg Oral QID    pantoprazole  40 mg Oral Daily    piperacillin-tazobactam (ZOSYN) IVPB  4.5 g Intravenous Q8H    senna-docusate 8.6-50 mg  1 tablet Oral Daily     Continuous Infusions:  As Needed:  dextrose 50%, dextrose 50%, glucagon (human recombinant), glucose, glucose, hydrALAZINE, insulin aspart U-100, labetalol, magnesium sulfate IVPB, magnesium sulfate IVPB, nitroGLYCERIN, potassium chloride in water **AND** potassium chloride in water **AND** potassium chloride in water, potassium, sodium phosphates, potassium, sodium phosphates,  potassium, sodium phosphates, promethazine (PHENERGAN) IVPB, sodium chloride 0.9%    Assessment and Plan  / Problems managed today    * Acute metabolic encephalopathy  Multifactorial from DKA/HHS and seizures. Improved and baseline. Consider ST for cognitive therapy.    Diabetic ketoacidosis with coma associated with type 2 diabetes mellitus  Patient was debilitated and doing poorly few weeks prior to admission. Poor oral intake coupled with missing doses of medications. A1c 11. Poor history of control  Arrived with acute encephalopathy  PH 7.2 +serum ketones  Likely mixed with hyperosmalar hyperglycemic syndrome as presenting serum gluse in 800s  Treated with insulin drip ad IVF fluids with improvement in mental status.  Endocrinology following and managing insulins    Focal seizures  EEG upon admission demonstrated seizures. Resolved with lacosamide loading. Now on lacosamide 100 mg BID.     NSTEMI (non-ST elevated myocardial infarction)  History of recent STEMI 1/9/2021.  Coronary artery disease  [facility] on 1/9/22 with two vessel coronary artery disease with 100% occlusion of mid RCA and 70% stenosis of proximal LAD, RCA stent x2. LAD lesions not intervened on. Cardiology consulted. Placed on ACS protocol with plavix, ASA, and heparin drip. Troponins peaked 1.5 and has been down trending since. NSTEMI likely type II due to DKA. Will continue on clopidogrel 75 mg daily, ASA 81 mg daily, apixaban 5 mg BID.    SIRS due to non-infectious process without acute organ dysfunction  BRENDAN. Leukocytosis and hypoxia. Likely driven by seizures and DKA/HHS. Patient empirically started on broad spectrum antibiotics. Blood cultures negative, UA negative, and CXR negative. Then tailored down to zosyn for presumed infection of sacral wound. However, wound does not look infected. Completed Zosyn for five days.      Nausea & vomiting  Possibly due to uncontrolled glucoses. Seemed improved with metoclopramide. Need better control of  glucoses.    Hyperkalemia  Admitted with K 6.2. Resolved with insulin drip and IVFs and subsequent resolution of BRENDAN    Dysarthria and anarthria  Improving. ST.     BRENDAN (acute kidney injury)  Patient with acute kidney injury likely d/t IVVD/Dehydration and Pre-renal azotemia Which is currently improving. Labs reviewed- Renal function/electrolytes with Estimated Creatinine Clearance: 47.2 mL/min (A) (based on SCr of 1.5 mg/dL (H)). according to latest data. Monitor urine output and serial BMP and adjust therapy as needed. Avoid nephrotoxins and renally dose meds for GFR listed above.     Resolved with IVFs.     Paroxysmal atrial fibrillation  Patient with Paroxysmal (<7 days) atrial fibrillation which is controlled currently with Beta Blocker, Calcium Channel Blocker and Amiodarone. Patient is currently in sinus rhythm.[id number] Score: 4. Anticoagulation indicated. Anticoagulation done with apixaban. Metoprolol XL 50 mg daily, diltiazem  mg daily and amiodarone 200 mg daily.    Embolic stroke involving right middle cerebral artery  Embolic stroke involving left cerebellar artery  Debility  Newly diagnosed 1/12/2022. PT/OT. Family requesting in-patient rehabilitation.   Continue apixaban, atorvastatin , ASA, metoprolol, diltiazem.    Hypertension associated with diabetes  continue losartan 25 mg daily, metoprolol and diltiazem.     Diet:  mechanical diet  GI PPx: protonix  DVT PPx:  apixaban  Airways: 2L NC  Wounds: none    Goals of Care:  Return to prior functional status     Discharge plan: home    Time (minutes) spent in care of the patient (Including face to face contact and coordination of care while on unit)  75 min    Adrienne Albright MD

## 2022-01-29 NOTE — ASSESSMENT & PLAN NOTE
- exert caution with insulin stacking in the presence of acute kidney injury  - kidney function showing improvement in the last few days  - management by primary team

## 2022-01-29 NOTE — PROGRESS NOTES
"[facility] ([facility])  Endocrinology  Progress Note    Admit Date: 1/25/2022     Mr. Kamar Muñoz is a 76-year-old-man with a history of CAD, T2DM, HLD, HTN, recent RMCA stroke with LSW, admitted for AMS found to have hemorraghic conversion of recent RMCA infarct and DKA; hospital course c/b R focal seizures and NSTEMI. Endocrinology consulted for "after DKA."     Upon arrival . pH 7.216. Bicarb 7. AG 29. BHB 4.8. UA 1+ ketones. Placed on insulin drip on admission, discontinued 1/26 after bicarb >18 and AG closed. Now on SQ aspart 6 U q4 hrs + MDSSI as well as D51/2 NS. Not tolerating diet well due to ongoing nausea, receiving prn zofran. Recent -->316-->250.   On vimpat for focal seizures. On heparin drip for NSTEMI, no chest pain upon evaluation.      Regarding Diabetes Mellitus:  - Initially diagnosed with Type 2 diabetes mellitus before 2004.   - A1c 11.5% on admission   - Current symptoms: nausea   - Denies:  chest pain, Polyuria/polydipsia, abdominal discomfort, numbness/tingling, visual changes, or subjective weight changes  Wt Readings from Last 5 Encounters:   01/26/22 95.2 kg (209 lb 14.1 oz)   01/19/22 89.7 kg (197 lb 12 oz)   01/14/22 90 kg (198 lb 6.6 oz)   01/10/22 90.3 kg (199 lb)   01/05/22 92.1 kg (203 lb 0.7 oz)     - Pertinent factors: admitted with DKA, managed by [facility].   - Denies: history of pancreatitis, recurrent gallstones, daily alcohol use, MEN syndrome, pancreatic tumors, or gastroparesis  - Known diabetic complications: cerebrovascular disease, neuropathy, CAD, UTIs  - Cardiovascular risk factors: advanced age (older than 55 for men, 65 for women), diabetes mellitus, dyslipidemia, hypertension, and male gender    - Current diabetic medications include:   1. Lantus 17 U qhs  2. Aspart 17 U TID with meals   3. Metformin 500 mg 2 times a day     - Patient now on or previously been on a GLP-1 agonist: no  - Current diet: in general, a "healthy" diet    - Current " "glucose monitoring regimen:  1 x/day  - Any episodes of hypoglycemia? no  - Family Hx:  Denies FH of diabetes or thyroid disorder       Diabetes Management Status  - Statin: atorvastatin 80 mg qd  - ACE/ARB: losartan 25 mg qd  - Seen Optometry/Ophthalmology within last 12 months: no    A complete 14 point review of systems was conducted and negative except for what is stated above.       Interval HPI:   Overnight events: patient hyperglycemic in last 24 hours, very poor appetite    BP (!) 147/69 (BP Location: Left arm, Patient Position: Lying)   Pulse 70   Temp 98.3 °F (36.8 °C) (Oral)   Resp 17   Ht 6' 2" (1.88 m)   Wt 95.2 kg (209 lb 14.1 oz)   SpO2 96%   BMI 26.95 kg/m²     Labs Reviewed and Include    Recent Labs   Lab 01/28/22  0247   *   CALCIUM 7.9*   ALBUMIN 2.2*   PROT 6.4      K 5.1   CO2 24      BUN 24*   CREATININE 1.5*   ALKPHOS 128   ALT 20   AST 45*   BILITOT 0.9     Lab Results   Component Value Date    WBC 11.32 01/28/2022    HGB 13.1 (L) 01/28/2022    HCT 41.1 01/28/2022    MCV 89 01/28/2022     01/28/2022     Recent Labs   Lab 01/25/22  1213   TSH 0.600     Lab Results   Component Value Date    HGBA1C 11.5 (H) 01/26/2022       Nutritional status:   Body mass index is 26.95 kg/m².  Lab Results   Component Value Date    ALBUMIN 2.2 (L) 01/28/2022    ALBUMIN 2.5 (L) 01/27/2022    ALBUMIN 2.4 (L) 01/27/2022     No results found for: PREALBUMIN    Estimated Creatinine Clearance: 47.2 mL/min (A) (based on SCr of 1.5 mg/dL (H)).    Accu-Checks  Recent Labs     01/27/22  0611 01/27/22  0919 01/27/22  1413 01/27/22  1620 01/27/22  1813 01/27/22  2101 01/28/22  0730 01/28/22  1121 01/28/22  1704 01/28/22  2115   POCTGLUCOSE 315* 250* 318* 236* 238* 314* 307* 281* 245* 263*       Current Medications and/or Treatments Impacting Glycemic Control  Immunotherapy:    Immunosuppressants     None        Steroids:   Hormones (From admission, onward)            None        Pressors:  "   Autonomic Drugs (From admission, onward)            Start     Stop Route Frequency Ordered    01/28/22 2100  metoprolol tartrate (LOPRESSOR) split tablet 12.5 mg         -- Oral 2 times daily 01/28/22 0930        Hyperglycemia/Diabetes Medications:   Antihyperglycemics (From admission, onward)            Start     Stop Route Frequency Ordered    01/28/22 2100  insulin detemir U-100 pen 10 Units         -- SubQ 2 times daily 01/28/22 1305    01/27/22 1756  insulin aspart U-100 pen 0-5 Units         -- SubQ Before meals & nightly PRN 01/27/22 1656    01/27/22 1526  insulin aspart U-100 pen 2-4 Units         -- SubQ 3 times daily with meals 01/27/22 1526          ASSESSMENT and PLAN    BRENDAN (acute kidney injury)  - exert caution with insulin stacking in the presence of acute kidney injury  - kidney function showing improvement in the last few days  - management by primary team    Ketosis-prone diabetes mellitus  Key History and Diagnostic Findings  -Patient presented with Diabetic Ketoacidosis in the setting of hemorraghic conversion of recent RMCA infarct complicated by focal seizures and new NSTEMI.   -Started on insulin drip and transitioned to subcutaneous insulin by primary team once diabetic ketoacidosis resolved.  -Currently decreased appetite and nausea limiting meal intake.   -Patient takes 17 U lantus qhs and 17 U aspart TID at home.      Recommendations:   - increasing aspart to 4-6 units three times a day before meals (4 Units if eating < 50% of meal, 6 Units if eating 100% of meal) with low dose correction scale  - continue insulin levamir 10 Units twice daily  - diabetic diet and POCT glucose check ACHS      Hypertension associated with diabetes  - management per primary team  - currently on losartan 25 mg daily  - further medications with anti-hypertensive on his regime include metoprolol tartrate 12.5 mg twice daily, cardizem 30 mg every 6 hours and amiodarone 200 mg daily          Modesto Abraham,  MD  Endocrinology  [facility]

## 2022-01-29 NOTE — SUBJECTIVE & OBJECTIVE
"Interval HPI:   Overnight events: patient hyperglycemic in last 24 hours, very poor appetite    BP (!) 147/69 (BP Location: Left arm, Patient Position: Lying)   Pulse 70   Temp 98.3 °F (36.8 °C) (Oral)   Resp 17   Ht 6' 2" (1.88 m)   Wt 95.2 kg (209 lb 14.1 oz)   SpO2 96%   BMI 26.95 kg/m²     Labs Reviewed and Include    Recent Labs   Lab 01/28/22  0247   *   CALCIUM 7.9*   ALBUMIN 2.2*   PROT 6.4      K 5.1   CO2 24      BUN 24*   CREATININE 1.5*   ALKPHOS 128   ALT 20   AST 45*   BILITOT 0.9     Lab Results   Component Value Date    WBC 11.32 01/28/2022    HGB 13.1 (L) 01/28/2022    HCT 41.1 01/28/2022    MCV 89 01/28/2022     01/28/2022     Recent Labs   Lab 01/25/22  1213   TSH 0.600     Lab Results   Component Value Date    HGBA1C 11.5 (H) 01/26/2022       Nutritional status:   Body mass index is 26.95 kg/m².  Lab Results   Component Value Date    ALBUMIN 2.2 (L) 01/28/2022    ALBUMIN 2.5 (L) 01/27/2022    ALBUMIN 2.4 (L) 01/27/2022     No results found for: PREALBUMIN    Estimated Creatinine Clearance: 47.2 mL/min (A) (based on SCr of 1.5 mg/dL (H)).    Accu-Checks  Recent Labs     01/27/22  0611 01/27/22  0919 01/27/22  1413 01/27/22  1620 01/27/22  1813 01/27/22  2101 01/28/22  0730 01/28/22  1121 01/28/22  1704 01/28/22  2115   POCTGLUCOSE 315* 250* 318* 236* 238* 314* 307* 281* 245* 263*       Current Medications and/or Treatments Impacting Glycemic Control  Immunotherapy:    Immunosuppressants     None        Steroids:   Hormones (From admission, onward)            None        Pressors:    Autonomic Drugs (From admission, onward)            Start     Stop Route Frequency Ordered    01/28/22 2100  metoprolol tartrate (LOPRESSOR) split tablet 12.5 mg         -- Oral 2 times daily 01/28/22 0930        Hyperglycemia/Diabetes Medications:   Antihyperglycemics (From admission, onward)            Start     Stop Route Frequency Ordered    01/28/22 2100  insulin detemir U-100 pen " 10 Units         -- SubQ 2 times daily 01/28/22 1305    01/27/22 1756  insulin aspart U-100 pen 0-5 Units         -- SubQ Before meals & nightly PRN 01/27/22 1656    01/27/22 1526  insulin aspart U-100 pen 2-4 Units         -- SubQ 3 times daily with meals 01/27/22 1526

## 2022-01-30 PROBLEM — E10.9 KETOSIS-PRONE DIABETES MELLITUS: Status: ACTIVE | Noted: 2022-01-30

## 2022-01-30 PROBLEM — R73.9 HYPERGLYCEMIA: Status: ACTIVE | Noted: 2022-01-30

## 2022-01-30 LAB
ALBUMIN SERPL BCP-MCNC: 2 G/DL (ref 3.5–5.2)
ANION GAP SERPL CALC-SCNC: 8 MMOL/L (ref 8–16)
BACTERIA BLD CULT: NORMAL
BACTERIA BLD CULT: NORMAL
BUN SERPL-MCNC: 21 MG/DL (ref 8–23)
CALCIUM SERPL-MCNC: 8.6 MG/DL (ref 8.7–10.5)
CHLORIDE SERPL-SCNC: 105 MMOL/L (ref 95–110)
CO2 SERPL-SCNC: 26 MMOL/L (ref 23–29)
CREAT SERPL-MCNC: 1.3 MG/DL (ref 0.5–1.4)
EST. GFR  (AFRICAN AMERICAN): >60 ML/MIN/1.73 M^2
EST. GFR  (NON AFRICAN AMERICAN): 52.3 ML/MIN/1.73 M^2
GLUCOSE SERPL-MCNC: 122 MG/DL (ref 70–110)
PHOSPHATE SERPL-MCNC: 1.6 MG/DL (ref 2.7–4.5)
POCT GLUCOSE: 123 MG/DL (ref 70–110)
POCT GLUCOSE: 405 MG/DL (ref 70–110)
POCT GLUCOSE: 440 MG/DL (ref 70–110)
POCT GLUCOSE: 470 MG/DL (ref 70–110)
POCT GLUCOSE: 479 MG/DL (ref 70–110)
POCT GLUCOSE: 487 MG/DL (ref 70–110)
POCT GLUCOSE: 76 MG/DL (ref 70–110)
POCT GLUCOSE: >500 MG/DL (ref 70–110)
POTASSIUM SERPL-SCNC: 3.9 MMOL/L (ref 3.5–5.1)
SODIUM SERPL-SCNC: 139 MMOL/L (ref 136–145)

## 2022-01-30 PROCEDURE — 36415 COLL VENOUS BLD VENIPUNCTURE: CPT | Performed by: INTERNAL MEDICINE

## 2022-01-30 PROCEDURE — 25000003 PHARM REV CODE 250: Performed by: STUDENT IN AN ORGANIZED HEALTH CARE EDUCATION/TRAINING PROGRAM

## 2022-01-30 PROCEDURE — 81001 URINALYSIS AUTO W/SCOPE: CPT | Performed by: INTERNAL MEDICINE

## 2022-01-30 PROCEDURE — 25000003 PHARM REV CODE 250

## 2022-01-30 PROCEDURE — 11000001 HC ACUTE MED/SURG PRIVATE ROOM

## 2022-01-30 PROCEDURE — 99232 SBSQ HOSP IP/OBS MODERATE 35: CPT | Mod: GC,,, | Performed by: INTERNAL MEDICINE

## 2022-01-30 PROCEDURE — 80069 RENAL FUNCTION PANEL: CPT | Performed by: INTERNAL MEDICINE

## 2022-01-30 PROCEDURE — 25000003 PHARM REV CODE 250: Performed by: PSYCHIATRY & NEUROLOGY

## 2022-01-30 PROCEDURE — 63600175 PHARM REV CODE 636 W HCPCS: Performed by: PSYCHIATRY & NEUROLOGY

## 2022-01-30 PROCEDURE — 63600175 PHARM REV CODE 636 W HCPCS: Performed by: STUDENT IN AN ORGANIZED HEALTH CARE EDUCATION/TRAINING PROGRAM

## 2022-01-30 PROCEDURE — 97530 THERAPEUTIC ACTIVITIES: CPT

## 2022-01-30 PROCEDURE — 63600175 PHARM REV CODE 636 W HCPCS: Performed by: INTERNAL MEDICINE

## 2022-01-30 PROCEDURE — 63600175 PHARM REV CODE 636 W HCPCS

## 2022-01-30 PROCEDURE — 25000003 PHARM REV CODE 250: Performed by: INTERNAL MEDICINE

## 2022-01-30 PROCEDURE — C9399 UNCLASSIFIED DRUGS OR BIOLOG: HCPCS | Performed by: STUDENT IN AN ORGANIZED HEALTH CARE EDUCATION/TRAINING PROGRAM

## 2022-01-30 PROCEDURE — C9254 INJECTION, LACOSAMIDE: HCPCS | Performed by: PSYCHIATRY & NEUROLOGY

## 2022-01-30 PROCEDURE — 97165 OT EVAL LOW COMPLEX 30 MIN: CPT

## 2022-01-30 PROCEDURE — 99232 PR SUBSEQUENT HOSPITAL CARE,LEVL II: ICD-10-PCS | Mod: GC,,, | Performed by: INTERNAL MEDICINE

## 2022-01-30 PROCEDURE — 99233 PR SUBSEQUENT HOSPITAL CARE,LEVL III: ICD-10-PCS | Mod: ,,, | Performed by: INTERNAL MEDICINE

## 2022-01-30 PROCEDURE — 99233 SBSQ HOSP IP/OBS HIGH 50: CPT | Mod: ,,, | Performed by: INTERNAL MEDICINE

## 2022-01-30 PROCEDURE — 94761 N-INVAS EAR/PLS OXIMETRY MLT: CPT

## 2022-01-30 RX ORDER — SODIUM,POTASSIUM PHOSPHATES 280-250MG
2 POWDER IN PACKET (EA) ORAL
Status: DISCONTINUED | OUTPATIENT
Start: 2022-01-31 | End: 2022-02-02 | Stop reason: HOSPADM

## 2022-01-30 RX ORDER — INSULIN ASPART 100 [IU]/ML
8 INJECTION, SOLUTION INTRAVENOUS; SUBCUTANEOUS
Status: DISCONTINUED | OUTPATIENT
Start: 2022-01-30 | End: 2022-01-30

## 2022-01-30 RX ORDER — DEXTROSE MONOHYDRATE AND SODIUM CHLORIDE 5; .45 G/100ML; G/100ML
INJECTION, SOLUTION INTRAVENOUS CONTINUOUS
Status: DISCONTINUED | OUTPATIENT
Start: 2022-01-31 | End: 2022-01-31

## 2022-01-30 RX ORDER — SODIUM CHLORIDE 9 MG/ML
INJECTION, SOLUTION INTRAVENOUS CONTINUOUS
Status: DISCONTINUED | OUTPATIENT
Start: 2022-01-30 | End: 2022-01-30

## 2022-01-30 RX ORDER — INSULIN ASPART 100 [IU]/ML
7 INJECTION, SOLUTION INTRAVENOUS; SUBCUTANEOUS ONCE
Status: COMPLETED | OUTPATIENT
Start: 2022-01-30 | End: 2022-01-30

## 2022-01-30 RX ADMIN — APIXABAN 5 MG: 5 TABLET, FILM COATED ORAL at 08:01

## 2022-01-30 RX ADMIN — INSULIN ASPART 5 UNITS: 100 INJECTION, SOLUTION INTRAVENOUS; SUBCUTANEOUS at 11:01

## 2022-01-30 RX ADMIN — SODIUM CHLORIDE 100 MG: 9 INJECTION, SOLUTION INTRAVENOUS at 09:01

## 2022-01-30 RX ADMIN — DILTIAZEM HYDROCHLORIDE 120 MG: 120 CAPSULE, COATED, EXTENDED RELEASE ORAL at 09:01

## 2022-01-30 RX ADMIN — INSULIN ASPART 3 UNITS: 100 INJECTION, SOLUTION INTRAVENOUS; SUBCUTANEOUS at 08:01

## 2022-01-30 RX ADMIN — CLOPIDOGREL 75 MG: 75 TABLET, FILM COATED ORAL at 09:01

## 2022-01-30 RX ADMIN — INSULIN ASPART 8 UNITS: 100 INJECTION, SOLUTION INTRAVENOUS; SUBCUTANEOUS at 05:01

## 2022-01-30 RX ADMIN — INSULIN ASPART 5 UNITS: 100 INJECTION, SOLUTION INTRAVENOUS; SUBCUTANEOUS at 05:01

## 2022-01-30 RX ADMIN — DOCUSATE SODIUM 50 MG AND SENNOSIDES 8.6 MG 1 TABLET: 8.6; 5 TABLET, FILM COATED ORAL at 09:01

## 2022-01-30 RX ADMIN — ATORVASTATIN CALCIUM 80 MG: 40 TABLET, FILM COATED ORAL at 09:01

## 2022-01-30 RX ADMIN — METOCLOPRAMIDE 5 MG: 5 INJECTION, SOLUTION INTRAMUSCULAR; INTRAVENOUS at 05:01

## 2022-01-30 RX ADMIN — APIXABAN 5 MG: 5 TABLET, FILM COATED ORAL at 09:01

## 2022-01-30 RX ADMIN — ASPIRIN 81 MG CHEWABLE TABLET 81 MG: 81 TABLET CHEWABLE at 09:01

## 2022-01-30 RX ADMIN — PIPERACILLIN SODIUM AND TAZOBACTAM SODIUM 4.5 G: 4; .5 INJECTION, POWDER, FOR SOLUTION INTRAVENOUS at 08:01

## 2022-01-30 RX ADMIN — PANTOPRAZOLE SODIUM 40 MG: 40 GRANULE, DELAYED RELEASE ORAL at 09:01

## 2022-01-30 RX ADMIN — DEXTROSE AND SODIUM CHLORIDE: 5; .45 INJECTION, SOLUTION INTRAVENOUS at 11:01

## 2022-01-30 RX ADMIN — INSULIN ASPART 4 UNITS: 100 INJECTION, SOLUTION INTRAVENOUS; SUBCUTANEOUS at 11:01

## 2022-01-30 RX ADMIN — INSULIN DETEMIR 10 UNITS: 100 INJECTION, SOLUTION SUBCUTANEOUS at 02:01

## 2022-01-30 RX ADMIN — SODIUM CHLORIDE: 0.9 INJECTION, SOLUTION INTRAVENOUS at 08:01

## 2022-01-30 RX ADMIN — METOPROLOL SUCCINATE 50 MG: 50 TABLET, EXTENDED RELEASE ORAL at 09:01

## 2022-01-30 RX ADMIN — METOCLOPRAMIDE 5 MG: 5 INJECTION, SOLUTION INTRAMUSCULAR; INTRAVENOUS at 01:01

## 2022-01-30 RX ADMIN — INSULIN HUMAN 3 UNITS/HR: 1 INJECTION, SOLUTION INTRAVENOUS at 09:01

## 2022-01-30 RX ADMIN — PIPERACILLIN SODIUM AND TAZOBACTAM SODIUM 4.5 G: 4; .5 INJECTION, POWDER, FOR SOLUTION INTRAVENOUS at 11:01

## 2022-01-30 RX ADMIN — SODIUM CHLORIDE 100 MG: 9 INJECTION, SOLUTION INTRAVENOUS at 11:01

## 2022-01-30 RX ADMIN — AMIODARONE HYDROCHLORIDE 200 MG: 200 TABLET ORAL at 09:01

## 2022-01-30 RX ADMIN — INSULIN DETEMIR 10 UNITS: 100 INJECTION, SOLUTION SUBCUTANEOUS at 08:01

## 2022-01-30 RX ADMIN — METOCLOPRAMIDE 5 MG: 5 INJECTION, SOLUTION INTRAMUSCULAR; INTRAVENOUS at 09:01

## 2022-01-30 RX ADMIN — INSULIN ASPART 7 UNITS: 100 INJECTION, SOLUTION INTRAVENOUS; SUBCUTANEOUS at 06:01

## 2022-01-30 RX ADMIN — LOSARTAN POTASSIUM 25 MG: 25 TABLET, FILM COATED ORAL at 09:01

## 2022-01-30 RX ADMIN — PIPERACILLIN SODIUM AND TAZOBACTAM SODIUM 4.5 G: 4; .5 INJECTION, POWDER, FOR SOLUTION INTRAVENOUS at 03:01

## 2022-01-30 NOTE — ASSESSMENT & PLAN NOTE
Key History and Diagnostic Findings  -Patient presented with Diabetic Ketoacidosis in the setting of hemorraghic conversion of recent RMCA infarct complicated by focal seizures and new NSTEMI.   -Started on insulin drip and transitioned to subcutaneous insulin by primary team once diabetic ketoacidosis resolved.  -Currently decreased appetite and nausea limiting meal intake.   -Patient takes 17 Units lantus nightly and 17 Units aspart TID at home (mismatched)       Recommendations:   - continue on aspart to 4-6 units three times a day before meals (4 Units if eating < 50% of meal, 6 Units if eating 100% of meal) with low dose correction scale  - continue insulin levamir 10 Units twice daily  - diabetic diet and POCT glucose check ACHS

## 2022-01-30 NOTE — ASSESSMENT & PLAN NOTE
Multifactorial from DKA/HHS and seizures. Improved and baseline. Consider ST for cognitive therapy.

## 2022-01-30 NOTE — ASSESSMENT & PLAN NOTE
Possibly due to uncontrolled glucoses. Seemed improved with metoclopramide. Need better control of glucoses.

## 2022-01-30 NOTE — ASSESSMENT & PLAN NOTE
- management per primary team  - on losartan 25 mg daily  - further medications with anti-hypertensive on his regime include metoprolol tartrate 12.5 mg twice daily, cardizem 30 mg every 6 hours and amiodarone 200 mg daily

## 2022-01-30 NOTE — ASSESSMENT & PLAN NOTE
BRENDAN. Leukocytosis and hypoxia. Likely driven by seizures and DKA/HHS. Patient empirically started on broad spectrum antibiotics. Blood cultures negative, UA negative, and CXR negative. Then tailored down to zosyn for presumed infection of sacral wound. However, wound does not look infected. Completed Zosyn for five days.

## 2022-01-30 NOTE — ASSESSMENT & PLAN NOTE
EEG upon admission demonstrated seizures. Resolved with lacosamide loading. Now on lacosamide 100 mg BID.

## 2022-01-30 NOTE — PLAN OF CARE
Problem: Adult Inpatient Plan of Care  Goal: Plan of Care Review  Outcome: Ongoing, Progressing  Goal: Absence of Hospital-Acquired Illness or Injury  Outcome: Ongoing, Progressing  Intervention: Prevent and Manage VTE (Venous Thromboembolism) Risk  Flowsheets (Taken 1/29/2022 0408)  Activity Management: Rolling - L1  VTE Prevention/Management:   remove, assess skin, and reapply sequential compression device   fluids promoted   ROM (active) performed  Range of Motion:   active ROM (range of motion) encouraged   ROM (range of motion) performed     Problem: Infection  Goal: Absence of Infection Signs and Symptoms  Outcome: Ongoing, Progressing  Intervention: Prevent or Manage Infection  Flowsheets (Taken 1/29/2022 0408)  Infection Management: aseptic technique maintained  Isolation Precautions: precautions maintained     Problem: Diabetes Comorbidity  Goal: Blood Glucose Level Within Targeted Range  Outcome: Ongoing, Progressing  Intervention: Monitor and Manage Glycemia  Flowsheets (Taken 1/29/2022 0408)  Glycemic Management: blood glucose monitored     Problem: Fall Injury Risk  Goal: Absence of Fall and Fall-Related Injury  Outcome: Ongoing, Progressing  Intervention: Promote Injury-Free Environment  Flowsheets (Taken 1/29/2022 0408)  Safety Promotion/Fall Prevention:   assistive device/personal item within reach   bed alarm set   Fall Risk reviewed with patient/family   medications reviewed   nonskid shoes/socks when out of bed   side rails raised x 3   instructed to call staff for mobility     Problem: Sleep Disturbance (Delirium)  Goal: Improved Sleep  Outcome: Ongoing, Progressing  Intervention: Promote Sleep  Flowsheets (Taken 1/29/2022 0408)  Sleep/Rest Enhancement:   awakenings minimized   relaxation techniques promoted

## 2022-01-30 NOTE — ASSESSMENT & PLAN NOTE
Patient with acute kidney injury likely d/t IVVD/Dehydration and Pre-renal azotemia Which is currently improving. Labs reviewed- Renal function/electrolytes with Estimated Creatinine Clearance: 47.2 mL/min (A) (based on SCr of 1.5 mg/dL (H)). according to latest data. Monitor urine output and serial BMP and adjust therapy as needed. Avoid nephrotoxins and renally dose meds for GFR listed above.     Resolved with IVFs.

## 2022-01-30 NOTE — PROGRESS NOTES
"[facility] - Neurosurgery ([facility])  Endocrinology  Progress Note    Admit Date: 1/25/2022     Mr. Kamar Muñoz is a 76-year-old-man with a history of CAD, T2DM, HLD, HTN, recent RMCA stroke with LSW, admitted for AMS found to have hemorraghic conversion of recent RMCA infarct and DKA; hospital course c/b R focal seizures and NSTEMI. Endocrinology consulted for "after DKA."     Upon arrival . pH 7.216. Bicarb 7. AG 29. BHB 4.8. UA 1+ ketones. Placed on insulin drip on admission, discontinued 1/26 after bicarb >18 and AG closed. Now on SQ aspart 6 U q4 hrs + MDSSI as well as D51/2 NS. Not tolerating diet well due to ongoing nausea, receiving prn zofran. Recent -->316-->250.   On vimpat for focal seizures. On heparin drip for NSTEMI, no chest pain upon evaluation.      Regarding Diabetes Mellitus:  - Initially diagnosed with Type 2 diabetes mellitus before 2004.   - A1c 11.5% on admission   - Current symptoms: nausea   - Denies:  chest pain, Polyuria/polydipsia, abdominal discomfort, numbness/tingling, visual changes, or subjective weight changes  Wt Readings from Last 5 Encounters:   01/26/22 95.2 kg (209 lb 14.1 oz)   01/19/22 89.7 kg (197 lb 12 oz)   01/14/22 90 kg (198 lb 6.6 oz)   01/10/22 90.3 kg (199 lb)   01/05/22 92.1 kg (203 lb 0.7 oz)     - Pertinent factors: admitted with DKA, managed by [facility].   - Denies: history of pancreatitis, recurrent gallstones, daily alcohol use, MEN syndrome, pancreatic tumors, or gastroparesis  - Known diabetic complications: cerebrovascular disease, neuropathy, CAD, UTIs  - Cardiovascular risk factors: advanced age (older than 55 for men, 65 for women), diabetes mellitus, dyslipidemia, hypertension, and male gender    - Current diabetic medications include:   1. Lantus 17 U qhs  2. Aspart 17 U TID with meals   3. Metformin 500 mg 2 times a day     - Patient now on or previously been on a GLP-1 agonist: no  - Current diet: in general, a "healthy" diet    - Current " "glucose monitoring regimen:  1 x/day  - Any episodes of hypoglycemia? no  - Family Hx:  Denies FH of diabetes or thyroid disorder       Diabetes Management Status  - Statin: atorvastatin 80 mg qd  - ACE/ARB: losartan 25 mg qd  - Seen Optometry/Ophthalmology within last 12 months: no    A complete 14 point review of systems was conducted and negative except for what is stated above.       Interval HPI:   Overnight events: slightly better control in blood sugars than previous days    BP (!) 146/65 (BP Location: Left arm, Patient Position: Lying)   Pulse 81   Temp 98.2 °F (36.8 °C) (Oral)   Resp 18   Ht 6' 2" (1.88 m)   Wt 95.2 kg (209 lb 14.1 oz)   SpO2 97%   BMI 26.95 kg/m²     Labs Reviewed and Include    No results for input(s): GLU, CALCIUM, ALBUMIN, PROT, NA, K, CO2, CL, BUN, CREATININE, ALKPHOS, ALT, AST, BILITOT in the last 24 hours.  Lab Results   Component Value Date    WBC 11.32 01/28/2022    HGB 13.1 (L) 01/28/2022    HCT 41.1 01/28/2022    MCV 89 01/28/2022     01/28/2022     Recent Labs   Lab 01/25/22  1213   TSH 0.600     Lab Results   Component Value Date    HGBA1C 11.5 (H) 01/26/2022       Nutritional status:   Body mass index is 26.95 kg/m².  Lab Results   Component Value Date    ALBUMIN 2.2 (L) 01/28/2022    ALBUMIN 2.5 (L) 01/27/2022    ALBUMIN 2.4 (L) 01/27/2022     No results found for: PREALBUMIN    Estimated Creatinine Clearance: 47.2 mL/min (A) (based on SCr of 1.5 mg/dL (H)).    Accu-Checks  Recent Labs     01/27/22  1813 01/27/22  2101 01/28/22  0730 01/28/22  1121 01/28/22  1704 01/28/22  2115 01/29/22  0725 01/29/22  1351 01/29/22  1636 01/29/22 2005   POCTGLUCOSE 238* 314* 307* 281* 245* 263* 328* 375* 239* 192*       Current Medications and/or Treatments Impacting Glycemic Control  Immunotherapy:    Immunosuppressants     None        Steroids:   Hormones (From admission, onward)            None        Pressors:    Autonomic Drugs (From admission, onward)            None    "     Hyperglycemia/Diabetes Medications:   Antihyperglycemics (From admission, onward)            Start     Stop Route Frequency Ordered    01/29/22 1130  insulin aspart U-100 pen 4-6 Units         -- SubQ 3 times daily with meals 01/29/22 1031    01/27/22 1756  insulin aspart U-100 pen 0-5 Units         -- SubQ Before meals & nightly PRN 01/27/22 1656          ASSESSMENT and PLAN    Ketosis-prone diabetes mellitus  Key History and Diagnostic Findings  -Patient presented with Diabetic Ketoacidosis in the setting of hemorraghic conversion of recent RMCA infarct complicated by focal seizures and new NSTEMI.   -Started on insulin drip and transitioned to subcutaneous insulin by primary team once diabetic ketoacidosis resolved.  -Currently decreased appetite and nausea limiting meal intake.   -Patient takes 17 Units lantus nightly and 17 Units aspart TID at home (mismatched)       Recommendations:   - continue on aspart to 4-6 units three times a day before meals (4 Units if eating < 50% of meal, 6 Units if eating 100% of meal) with low dose correction scale  - continue insulin levamir 10 Units twice daily  - diabetic diet and POCT glucose check ACHS      Diabetic ketoacidosis with coma associated with type 2 diabetes mellitus  Resolved, management of diabetes as above      BRENDAN (acute kidney injury)  - exert caution with insulin stacking in the presence of acute kidney injury  - kidney function showing improvement in the last few days  - management by primary team    Hypertension associated with diabetes  - management per primary team  - on losartan 25 mg daily  - further medications with anti-hypertensive on his regime include metoprolol tartrate 12.5 mg twice daily, cardizem 30 mg every 6 hours and amiodarone 200 mg daily          Modesto Abraham MD  Endocrinology  [facility]

## 2022-01-30 NOTE — ASSESSMENT & PLAN NOTE
History of recent STEMI 1/9/2021.  Coronary artery disease  [facility] on 1/9/22 with two vessel coronary artery disease with 100% occlusion of mid RCA and 70% stenosis of proximal LAD, RCA stent x2. LAD lesions not intervened on. Cardiology consulted. Placed on ACS protocol with plavix, ASA, and heparin drip. Troponins peaked 1.5 and has been down trending since. NSTEMI likely type II due to DKA. Will continue on clopidogrel 75 mg daily, ASA 81 mg daily, apixaban 5 mg BID.

## 2022-01-30 NOTE — PT/OT/SLP EVAL
Occupational Therapy   Evaluation and Treatment    Name: Kamar Muñoz  MRN: [id number]  Admitting Diagnosis:  Acute metabolic encephalopathy    Length of Stay: 6 days    Recommendations:     Discharge Recommendations: rehabilitation facility  Discharge Equipment Recommendations:  other (see comments) (TBD (progress pending))  Barriers to discharge:  None    Plan:     Patient to be seen 4 x/week to address the above listed problems via self-care/home management,therapeutic activities,therapeutic exercises,neuromuscular re-education  · Plan of Care Expires: 03/01/22  · Plan of Care Reviewed with: patient    Assessment:     Kamar Muñoz is a 76 y.o. male with a medical diagnosis of Acute metabolic encephalopathy.  He presents with the following performance deficits affecting function: weakness,impaired endurance,decreased upper extremity function,impaired self care skills,impaired functional mobilty,decreased lower extremity function,decreased safety awareness,gait instability,impaired balance,decreased coordination,impaired coordination,impaired fine motor.      Pt presenting with increased left side weakness, impaired LUE/LLE function, decreased activity tolerance, impaired endurance, increased fatigue, impaired coordination, impaired balance and decreased safety awareness upon evaluation this date, requiring increased time and assistance to complete functional tasks. Patient completed bed mobility with CGA while HOB elevated. Patient completed sit>stand transfers and functional mobility of household distance ~20ft with Min A using hand-held assist, required cueing for safety. Patient observed with impaired balance and gait instability. Pt would benefit from continued acute skilled OT services at this time to increase functional performance, and improve quality of life. OT to recommend Rehab at discharge once medically appropriate for increased functional independence and to improve patient safety before  "returning home.    Rehab Prognosis: Fair; patient would benefit from acute skilled OT services to address these deficits and reach maximum level of function.       Subjective   Patient states: " I've been up to the bathroom with help "    Communicated with: Nurse Moctezuma prior to session.  Patient found left sidelying with bed alarm,peripheral IV upon OT entry to room.    Chief Complaint: Slurred Speech (. Slurred speech, left facial droop, and left sided weakness. No drift. Recently dx for a stroke, no deficits at that time.)     Patient/Family Comments/goals: to return [facility]    Pain/Comfort:       Patients cultural, spiritual, Uatsdin conflicts given the current situation: no    Occupational Profile:  Living Environment: lives with spouse in [address] [place] [address]; walk-in shower with built in seat and grab bars  Prior Level of Function: Patient reports being Independent with mobility & with ADLs.   Patient uses DME as follows: none.   DME owned (not currently used): none.  Roles/Repsonsibilities:   Hand Dominance: right   Work: no.   Drive: yes.   Managing Medicines/Managing Home: yes.   Equipment Used at Home:  none    Patient reports they will have assistance from spouse as needed upon discharge.      Objective:     Patient found with: bed alarm,peripheral IV   General Precautions: Standard, Cardiac aspiration,fall   Orthopedic Precautions:N/A   Braces:     Oxygen Device: Room Air  Vitals: BP (!) 146/70 (Patient Position: Lying)   Pulse 78   Temp 97.3 °F (36.3 °C) (Oral)   Resp 18   Ht 6' 2" (1.88 m)   Wt 95.2 kg (209 lb 14.1 oz)   SpO2 95%   BMI 26.95 kg/m²     Cognitive and Psychosocial Function:   · AxOx4 -- Person, Place, Time and Situation   · Follows Commands/attention:follows two-step commands  · Communication:  clear/fluent  · Memory: No Deficits noted  · Safety awareness/insight to disability: impaired   · Mood/Affect/Coping skills/emotional control: Appropriate to situation    Hearing: " Intact    Vision:  Intact visual fields    Physical Exam:  Postural examination/scapula alignment:    -       Forward head  Skin integrity: Visible skin intact     Left UE Right UE   UE Edema absent absent   UE ROM AROM WFL AROM WFL   UE Strength 3+/5 4/5    Strength 3+/5 4/5   Sensation LUE INTACT:WFL RUE INTACT: WFL   Fine Motor Coordination:  LUE IMPAIRED: manipulation of objects RUE INTACT: WFL   Gross Motor Coordination: LUE IMPAIRED:  RUE INTACT:WFL     Occupational Performance:  Bed Mobility:    · Patient completed Supine to Sit with contact guard assistance on L side of bed  · Scooting anteriorly to EOB to have both feet planted on floor: stand by assistance  · Patient completed Sit to Supine with contact guard assistance on L side of bed    Functional Mobility/Transfers:   Static Sitting EOB: SBA   Patient completed Sit <> Stand Transfer from EOB with minimum assistance  with  hand-held assist x 2 trials, cueing for NICOLE   Static Standing Balance: CGA   Pt completed functional mobility of household distance ~20ft with Min A using hand-held assist. Required increased time and cueing due to impaired balance and gait instability      Activities of Daily Living:  · Declined additional ADL needs  · Upper Body Dressing: moderate assistance to doff/don clean gown  · Lower Body Dressing: maximal assistance to don socks      AMPAC 6 Click ADL:  AMPAC Total Score: 17    Treatment & Education:  -Pt education on OT role and POC.  -Importance of E/OOB activity with staff assistance, emphasis on daily participation  -Safety during functional transfer and mobility ensured  -Patient provided with education on importance of Bilateral UB/LB integration during functional tasks for improvement in functional performance.   -Education provided/reviewed, questions answered within OT scope of practice.   -Patient demonstrates understanding and learning this date.         Patient left left sidelying with all lines intact,  call button in reach, bed alarm on and nurse notified    GOALS:   Multidisciplinary Problems     Occupational Therapy Goals        Problem: Occupational Therapy Goal    Goal Priority Disciplines Outcome Interventions   Occupational Therapy Goal     OT, PT/OT Ongoing, Progressing    Description: Goals set on 1/30, with expiration date 2/13:  Patient will increase functional independence with ADLs by performing:    Bed mobility with Supervision  Grooming while standing at sink with Supervision  UB Dressing with Supervision.  LB Dressing with Supervision.  Toileting from toilet with Supervision for hygiene and clothing management.   Functional mobility of household and community distance with Supervision and AD as needed  Pt will demonstrate understanding of education provided regarding energy conservation and task modification through teach-back method.  Pt will demonstrate [place] in HEP for LUE strengthening GM/FM exercises to improve functional performance.                      History:     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 8/2/2012    STEMI involving right coronary artery 1/9/2022    Type II or unspecified type diabetes mellitus with neurological manifestations, uncontrolled(250.62) 3/8/2013    Urinary tract infection          Past Surgical History:   Procedure Laterality Date    BACK SURGERY      CATARACT EXTRACTION W/  INTRAOCULAR LENS IMPLANT Right     Per Dr Romero note 11/2018    COLONOSCOPY N/A 1/28/2019    Procedure: COLONOSCOPY Suprep;  Surgeon: Anh Johnson MD;  Location: [facility] ENDO;  Service: Endoscopy;  Laterality: N/A;    EYE SURGERY      HERNIA REPAIR      LEFT HEART CATHETERIZATION Left 1/9/2022    Procedure: CATHETERIZATION, HEART, LEFT;  Surgeon: Will Hurst III, MD;  Location: [facility] CATH LAB/EP;   Service: Cardiology;  Laterality: Left;    renal stones      SHOULDER OPEN ROTATOR CUFF REPAIR         Time Tracking:       OT Date of Treatment: 01/30/22  OT Start Time: 1040  OT Stop Time: 1058  OT Total Time (min): 18 min    Billable Minutes:Evaluation 8  Therapeutic Activity 10      1/31/2022

## 2022-01-30 NOTE — ASSESSMENT & PLAN NOTE
Patient was debilitated and doing poorly few weeks prior to admission. Poor oral intake coupled with missing doses of medications. A1c 11. Poor history of control  Arrived with acute encephalopathy  PH 7.2 +serum ketones  Likely mixed with hyperosmalar hyperglycemic syndrome as presenting serum gluse in 800s  Treated with insulin drip ad IVF fluids with improvement in mental status.  Developed glucoses on 1/20 in 400s without DKA or HHS. Required IV insulin intensive drip to bring down.  Endocrinology following and managing insulins

## 2022-01-30 NOTE — ASSESSMENT & PLAN NOTE
Patient with Paroxysmal (<7 days) atrial fibrillation which is controlled currently with Beta Blocker, Calcium Channel Blocker and Amiodarone. Patient is currently in sinus rhythm.[id number] Score: 4. Anticoagulation indicated. Anticoagulation done with apixaban. Metoprolol XL 50 mg daily, diltiazem  mg daily and amiodarone 200 mg daily.

## 2022-01-30 NOTE — PLAN OF CARE
Problem: Diabetes Comorbidity  Goal: Blood Glucose Level Within Targeted Range  Outcome: Ongoing, Progressing     Problem: Fall Injury Risk  Goal: Absence of Fall and Fall-Related Injury  Outcome: Ongoing, Progressing     Problem: Skin Injury Risk Increased  Goal: Skin Health and Integrity  Outcome: Ongoing, Progressing   Patient seem confused at times. Patient took 3 bites of food today scared he might get sick. Patient blood sugar continues to be high.

## 2022-01-30 NOTE — SUBJECTIVE & OBJECTIVE
"Interval HPI:   Overnight events: slightly better control in blood sugars than previous days    BP (!) 146/65 (BP Location: Left arm, Patient Position: Lying)   Pulse 81   Temp 98.2 °F (36.8 °C) (Oral)   Resp 18   Ht 6' 2" (1.88 m)   Wt 95.2 kg (209 lb 14.1 oz)   SpO2 97%   BMI 26.95 kg/m²     Labs Reviewed and Include    No results for input(s): GLU, CALCIUM, ALBUMIN, PROT, NA, K, CO2, CL, BUN, CREATININE, ALKPHOS, ALT, AST, BILITOT in the last 24 hours.  Lab Results   Component Value Date    WBC 11.32 01/28/2022    HGB 13.1 (L) 01/28/2022    HCT 41.1 01/28/2022    MCV 89 01/28/2022     01/28/2022     Recent Labs   Lab 01/25/22  1213   TSH 0.600     Lab Results   Component Value Date    HGBA1C 11.5 (H) 01/26/2022       Nutritional status:   Body mass index is 26.95 kg/m².  Lab Results   Component Value Date    ALBUMIN 2.2 (L) 01/28/2022    ALBUMIN 2.5 (L) 01/27/2022    ALBUMIN 2.4 (L) 01/27/2022     No results found for: PREALBUMIN    Estimated Creatinine Clearance: 47.2 mL/min (A) (based on SCr of 1.5 mg/dL (H)).    Accu-Checks  Recent Labs     01/27/22  1813 01/27/22  2101 01/28/22  0730 01/28/22  1121 01/28/22  1704 01/28/22  2115 01/29/22  0725 01/29/22  1351 01/29/22  1636 01/29/22 2005   POCTGLUCOSE 238* 314* 307* 281* 245* 263* 328* 375* 239* 192*       Current Medications and/or Treatments Impacting Glycemic Control  Immunotherapy:    Immunosuppressants     None        Steroids:   Hormones (From admission, onward)            None        Pressors:    Autonomic Drugs (From admission, onward)            None        Hyperglycemia/Diabetes Medications:   Antihyperglycemics (From admission, onward)            Start     Stop Route Frequency Ordered    01/29/22 1130  insulin aspart U-100 pen 4-6 Units         -- SubQ 3 times daily with meals 01/29/22 1031    01/27/22 1756  insulin aspart U-100 pen 0-5 Units         -- SubQ Before meals & nightly PRN 01/27/22 1656        "

## 2022-01-30 NOTE — PLAN OF CARE
OT evaluation completed, POC established as appropriate. OT recommending Rehab with potential to progress once medically stable for discharge.    Problem: Occupational Therapy Goal  Goal: Occupational Therapy Goal  Description: Goals set on 1/30, with expiration date 2/13:  Patient will increase functional independence with ADLs by performing:    Bed mobility with Supervision  Grooming while standing at sink with Supervision  UB Dressing with Supervision.  LB Dressing with Supervision.  Toileting from toilet with Supervision for hygiene and clothing management.   Functional mobility of household and community distance with Supervision and AD as needed  Pt will demonstrate understanding of education provided regarding energy conservation and task modification through teach-back method.  Pt will demonstrate [place] in HEP for LUE strengthening GM/FM exercises to improve functional performance.     Outcome: Ongoing, Progressing

## 2022-01-30 NOTE — NURSING
Pts CBG >500, sent a secure chat to team. Will continue to monitor. Waiting for response.     1715 CBG >400, team notified via secure chat again.   Will continue to monitor. Seen by MD. Waiting for response.     Dr. Abraham messaged back and asked to do a recheck in an hour after insulin admin.   1813, , sent secure chat. Order in for another 7units now. Per Dr. Abraham recheck in two hours and let him know what CBG is. Will continue to monitor and pass to night nurse.     Rhianna Lopez,RN

## 2022-01-30 NOTE — NURSING
"Pt c/o indigestion. VS stable see assessment. NADN. Sent message via secure chat to [facility] to inform. Received order for Aluminum & Mag hydroxide-simethicone see orders offered to pt and he refused verbalized " I will just drink water". Re-educated pt on indigestion and the purpose of the medication and he still refused.   "

## 2022-01-30 NOTE — NURSING
POC reviewed with pt and pt verbalized understanding. Questions/Concerns addressed. A&Ox3. Calm/cooperative. NADN. Respirations equal and unlabored. Bed in low position, side rails up x3.  Safety/Fall precautions in place per facility protocol for safety. Instructed pt to press call bell for assistance if needed pt verbalized understanding. VS stable. Neuro assessment completed see assessment. Will continue to monitor closely throughout this 3913-0612 nightshift Safety maintained. Call bell in reach. Bed alarm activated for safety. Took night medications swallows without difficulty. Pts appetite is poor. Has urinal@ bedside. Bedrest turn Q 2 hours for pressure injury prevention. Continent of B/B. Telemetry order discontinued per MD. Telemetry box removed per facility protocol and returned to return bin.

## 2022-01-30 NOTE — ASSESSMENT & PLAN NOTE
Embolic stroke involving left cerebellar artery  Debility  Newly diagnosed 1/12/2022. PT/OT. Family requesting in-patient rehabilitation.   Continue apixaban, atorvastatin , ASA, metoprolol, diltiazem.

## 2022-01-31 PROBLEM — R73.9 HYPERGLYCEMIA: Status: RESOLVED | Noted: 2022-01-30 | Resolved: 2022-01-31

## 2022-01-31 LAB
ALBUMIN SERPL BCP-MCNC: 2 G/DL (ref 3.5–5.2)
ALBUMIN SERPL BCP-MCNC: 2 G/DL (ref 3.5–5.2)
ANION GAP SERPL CALC-SCNC: 10 MMOL/L (ref 8–16)
ANION GAP SERPL CALC-SCNC: 11 MMOL/L (ref 8–16)
BACTERIA #/AREA URNS AUTO: ABNORMAL /HPF
BILIRUB UR QL STRIP: ABNORMAL
BUN SERPL-MCNC: 18 MG/DL (ref 8–23)
BUN SERPL-MCNC: 20 MG/DL (ref 8–23)
CALCIUM SERPL-MCNC: 8.7 MG/DL (ref 8.7–10.5)
CALCIUM SERPL-MCNC: 8.7 MG/DL (ref 8.7–10.5)
CHLORIDE SERPL-SCNC: 106 MMOL/L (ref 95–110)
CHLORIDE SERPL-SCNC: 107 MMOL/L (ref 95–110)
CLARITY UR REFRACT.AUTO: ABNORMAL
CO2 SERPL-SCNC: 25 MMOL/L (ref 23–29)
CO2 SERPL-SCNC: 26 MMOL/L (ref 23–29)
COLOR UR AUTO: YELLOW
CREAT SERPL-MCNC: 1.2 MG/DL (ref 0.5–1.4)
CREAT SERPL-MCNC: 1.3 MG/DL (ref 0.5–1.4)
EST. GFR  (AFRICAN AMERICAN): >60 ML/MIN/1.73 M^2
EST. GFR  (AFRICAN AMERICAN): >60 ML/MIN/1.73 M^2
EST. GFR  (NON AFRICAN AMERICAN): 52.3 ML/MIN/1.73 M^2
EST. GFR  (NON AFRICAN AMERICAN): 57.6 ML/MIN/1.73 M^2
GLUCOSE SERPL-MCNC: 117 MG/DL (ref 70–110)
GLUCOSE SERPL-MCNC: 79 MG/DL (ref 70–110)
GLUCOSE UR QL STRIP: ABNORMAL
HGB UR QL STRIP: ABNORMAL
HYALINE CASTS UR QL AUTO: 0 /LPF
KETONES UR QL STRIP: ABNORMAL
LEUKOCYTE ESTERASE UR QL STRIP: NEGATIVE
MICROSCOPIC COMMENT: ABNORMAL
NITRITE UR QL STRIP: NEGATIVE
PH UR STRIP: 5 [PH] (ref 5–8)
PHOSPHATE SERPL-MCNC: 2 MG/DL (ref 2.7–4.5)
PHOSPHATE SERPL-MCNC: 2.9 MG/DL (ref 2.7–4.5)
POCT GLUCOSE: 143 MG/DL (ref 70–110)
POCT GLUCOSE: 162 MG/DL (ref 70–110)
POCT GLUCOSE: 183 MG/DL (ref 70–110)
POCT GLUCOSE: 255 MG/DL (ref 70–110)
POCT GLUCOSE: 75 MG/DL (ref 70–110)
POCT GLUCOSE: 84 MG/DL (ref 70–110)
POTASSIUM SERPL-SCNC: 3.7 MMOL/L (ref 3.5–5.1)
POTASSIUM SERPL-SCNC: 4 MMOL/L (ref 3.5–5.1)
PROT UR QL STRIP: ABNORMAL
RBC #/AREA URNS AUTO: 12 /HPF (ref 0–4)
SODIUM SERPL-SCNC: 142 MMOL/L (ref 136–145)
SODIUM SERPL-SCNC: 143 MMOL/L (ref 136–145)
SP GR UR STRIP: 1.01 (ref 1–1.03)
SQUAMOUS #/AREA URNS AUTO: 0 /HPF
URN SPEC COLLECT METH UR: ABNORMAL
WBC #/AREA URNS AUTO: 13 /HPF (ref 0–5)
YEAST UR QL AUTO: ABNORMAL

## 2022-01-31 PROCEDURE — 99233 SBSQ HOSP IP/OBS HIGH 50: CPT | Mod: ,,, | Performed by: INTERNAL MEDICINE

## 2022-01-31 PROCEDURE — 99222 1ST HOSP IP/OBS MODERATE 55: CPT | Mod: ,,, | Performed by: NURSE PRACTITIONER

## 2022-01-31 PROCEDURE — C9254 INJECTION, LACOSAMIDE: HCPCS | Performed by: PSYCHIATRY & NEUROLOGY

## 2022-01-31 PROCEDURE — 99222 PR INITIAL HOSPITAL CARE,LEVL II: ICD-10-PCS | Mod: ,,, | Performed by: NURSE PRACTITIONER

## 2022-01-31 PROCEDURE — 25000003 PHARM REV CODE 250

## 2022-01-31 PROCEDURE — 63600175 PHARM REV CODE 636 W HCPCS

## 2022-01-31 PROCEDURE — 25000003 PHARM REV CODE 250: Performed by: PSYCHIATRY & NEUROLOGY

## 2022-01-31 PROCEDURE — 92526 ORAL FUNCTION THERAPY: CPT

## 2022-01-31 PROCEDURE — 63600175 PHARM REV CODE 636 W HCPCS: Performed by: INTERNAL MEDICINE

## 2022-01-31 PROCEDURE — 63600175 PHARM REV CODE 636 W HCPCS: Performed by: STUDENT IN AN ORGANIZED HEALTH CARE EDUCATION/TRAINING PROGRAM

## 2022-01-31 PROCEDURE — 97162 PT EVAL MOD COMPLEX 30 MIN: CPT

## 2022-01-31 PROCEDURE — 25000003 PHARM REV CODE 250: Performed by: INTERNAL MEDICINE

## 2022-01-31 PROCEDURE — 99232 PR SUBSEQUENT HOSPITAL CARE,LEVL II: ICD-10-PCS | Mod: GC,,, | Performed by: GENERAL ACUTE CARE HOSPITAL

## 2022-01-31 PROCEDURE — S5010 5% DEXTROSE AND 0.45% SALINE: HCPCS | Performed by: INTERNAL MEDICINE

## 2022-01-31 PROCEDURE — 99232 SBSQ HOSP IP/OBS MODERATE 35: CPT | Mod: GC,,, | Performed by: GENERAL ACUTE CARE HOSPITAL

## 2022-01-31 PROCEDURE — 11000001 HC ACUTE MED/SURG PRIVATE ROOM

## 2022-01-31 PROCEDURE — 36415 COLL VENOUS BLD VENIPUNCTURE: CPT | Performed by: INTERNAL MEDICINE

## 2022-01-31 PROCEDURE — 99233 PR SUBSEQUENT HOSPITAL CARE,LEVL III: ICD-10-PCS | Mod: ,,, | Performed by: INTERNAL MEDICINE

## 2022-01-31 PROCEDURE — 80069 RENAL FUNCTION PANEL: CPT | Performed by: INTERNAL MEDICINE

## 2022-01-31 PROCEDURE — 63600175 PHARM REV CODE 636 W HCPCS: Performed by: PSYCHIATRY & NEUROLOGY

## 2022-01-31 RX ORDER — INSULIN ASPART 100 [IU]/ML
0-5 INJECTION, SOLUTION INTRAVENOUS; SUBCUTANEOUS
Status: DISCONTINUED | OUTPATIENT
Start: 2022-01-31 | End: 2022-01-31

## 2022-01-31 RX ORDER — LACOSAMIDE 100 MG/1
100 TABLET ORAL EVERY 12 HOURS
Status: DISCONTINUED | OUTPATIENT
Start: 2022-01-31 | End: 2022-02-02 | Stop reason: HOSPADM

## 2022-01-31 RX ORDER — METOCLOPRAMIDE 5 MG/1
10 TABLET ORAL
Status: DISCONTINUED | OUTPATIENT
Start: 2022-01-31 | End: 2022-02-02 | Stop reason: HOSPADM

## 2022-01-31 RX ORDER — INSULIN ASPART 100 [IU]/ML
7 INJECTION, SOLUTION INTRAVENOUS; SUBCUTANEOUS
Status: DISCONTINUED | OUTPATIENT
Start: 2022-01-31 | End: 2022-01-31

## 2022-01-31 RX ORDER — INSULIN ASPART 100 [IU]/ML
0-5 INJECTION, SOLUTION INTRAVENOUS; SUBCUTANEOUS
Status: DISCONTINUED | OUTPATIENT
Start: 2022-01-31 | End: 2022-02-02

## 2022-01-31 RX ORDER — INSULIN ASPART 100 [IU]/ML
4-7 INJECTION, SOLUTION INTRAVENOUS; SUBCUTANEOUS
Status: DISCONTINUED | OUTPATIENT
Start: 2022-01-31 | End: 2022-02-01

## 2022-01-31 RX ADMIN — LOSARTAN POTASSIUM 25 MG: 25 TABLET, FILM COATED ORAL at 09:01

## 2022-01-31 RX ADMIN — ASPIRIN 81 MG CHEWABLE TABLET 81 MG: 81 TABLET CHEWABLE at 09:01

## 2022-01-31 RX ADMIN — POTASSIUM & SODIUM PHOSPHATES POWDER PACK 280-160-250 MG 2 PACKET: 280-160-250 PACK at 08:01

## 2022-01-31 RX ADMIN — METOCLOPRAMIDE 10 MG: 5 TABLET ORAL at 05:01

## 2022-01-31 RX ADMIN — PANTOPRAZOLE SODIUM 40 MG: 40 GRANULE, DELAYED RELEASE ORAL at 09:01

## 2022-01-31 RX ADMIN — INSULIN DETEMIR 10 UNITS: 100 INJECTION, SOLUTION SUBCUTANEOUS at 09:01

## 2022-01-31 RX ADMIN — ATORVASTATIN CALCIUM 80 MG: 40 TABLET, FILM COATED ORAL at 09:01

## 2022-01-31 RX ADMIN — APIXABAN 5 MG: 5 TABLET, FILM COATED ORAL at 09:01

## 2022-01-31 RX ADMIN — INSULIN ASPART 5 UNITS: 100 INJECTION, SOLUTION INTRAVENOUS; SUBCUTANEOUS at 05:01

## 2022-01-31 RX ADMIN — METOPROLOL SUCCINATE 50 MG: 50 TABLET, EXTENDED RELEASE ORAL at 09:01

## 2022-01-31 RX ADMIN — POTASSIUM & SODIUM PHOSPHATES POWDER PACK 280-160-250 MG 2 PACKET: 280-160-250 PACK at 12:01

## 2022-01-31 RX ADMIN — METOCLOPRAMIDE 5 MG: 5 INJECTION, SOLUTION INTRAMUSCULAR; INTRAVENOUS at 09:01

## 2022-01-31 RX ADMIN — POTASSIUM & SODIUM PHOSPHATES POWDER PACK 280-160-250 MG 2 PACKET: 280-160-250 PACK at 09:01

## 2022-01-31 RX ADMIN — INSULIN ASPART 7 UNITS: 100 INJECTION, SOLUTION INTRAVENOUS; SUBCUTANEOUS at 12:01

## 2022-01-31 RX ADMIN — AMIODARONE HYDROCHLORIDE 200 MG: 200 TABLET ORAL at 09:01

## 2022-01-31 RX ADMIN — CLOPIDOGREL 75 MG: 75 TABLET, FILM COATED ORAL at 09:01

## 2022-01-31 RX ADMIN — POTASSIUM & SODIUM PHOSPHATES POWDER PACK 280-160-250 MG 2 PACKET: 280-160-250 PACK at 05:01

## 2022-01-31 RX ADMIN — DILTIAZEM HYDROCHLORIDE 120 MG: 120 CAPSULE, COATED, EXTENDED RELEASE ORAL at 09:01

## 2022-01-31 RX ADMIN — LACOSAMIDE 100 MG: 100 TABLET, FILM COATED ORAL at 09:01

## 2022-01-31 RX ADMIN — DOCUSATE SODIUM 50 MG AND SENNOSIDES 8.6 MG 1 TABLET: 8.6; 5 TABLET, FILM COATED ORAL at 09:01

## 2022-01-31 RX ADMIN — SODIUM CHLORIDE 100 MG: 9 INJECTION, SOLUTION INTRAVENOUS at 09:01

## 2022-01-31 RX ADMIN — INSULIN ASPART 2 UNITS: 100 INJECTION, SOLUTION INTRAVENOUS; SUBCUTANEOUS at 05:01

## 2022-01-31 RX ADMIN — PIPERACILLIN SODIUM AND TAZOBACTAM SODIUM 4.5 G: 4; .5 INJECTION, POWDER, FOR SOLUTION INTRAVENOUS at 03:01

## 2022-01-31 NOTE — PLAN OF CARE
[facility] - Neurosurgery ([facility])  Discharge Reassessment    Primary Care Provider: Basim Guerrero MD    Expected Discharge Date: 2/1/2022     Pt is expected to be medically ready for discharge on 2/1 per Dr. Albright. SW sent a referral via [facility] to [facility] as this is the only SNF that pt will consider per Dr. Albright.    Reassessment (most recent)     Discharge Reassessment - 01/31/22 1117        Discharge Reassessment    Assessment Type Discharge Planning Reassessment     Discharge Plan A Skilled Nursing Facility     Discharge Plan B Rehab     Discharge Barriers Identified None     Why the patient remains in the hospital Requires continued medical care        Post-Acute Status    Post-Acute Authorization Placement     Post-Acute Placement Status Referrals Sent     Discharge Delays None known at this time               SW will continue to coordinate with patient, family, team and insurance to complete patient's discharge plan.    Irasema Tesfaye, SHANNON  [id number]

## 2022-01-31 NOTE — PLAN OF CARE
Problem: SLP Goal  Goal: SLP Goal  Description: Goals due 2/3  1.  Tolerate mech soft diet with thin liquids with no s/s of aspiration

## 2022-01-31 NOTE — ASSESSMENT & PLAN NOTE
-  Avoid insulin stacking  - kidney function showing improvement in the last few days  - management by primary team

## 2022-01-31 NOTE — ASSESSMENT & PLAN NOTE
Brief Hx:     Presented with Diabetic Ketoacidosis in the setting of hemorraghic conversion of recent RMCA infarct complicated by focal seizures and new NSTEMI.     DKA now resolved  Consulted for:   DKA  DM type:   Ketosis prone T2D  A1C: 11.5 01/26/2022  Hypoglycemia:  none  Steroids:   none  Diet/Tfs:    Variable 0-50%  Glucose Goals:  140-180 mg/dL    Glucose Trend x 24hr:         Home Regimen:    Lantus 17 units at bedtime, aspart 17 units with meals, (mismatched)    mg BID      PLAN:   -  Detemir  10 units BID   -  Aspart  4-7  units TIDWM   -  Low dose correction insulin   -  Accucheck [facility]2a

## 2022-01-31 NOTE — PLAN OF CARE
PT Eval complete and POC established.    Corrie Schmidt, PT, DPT  2022      Problem: Physical Therapy Goal  Goal: Physical Therapy Goal  Description: Goals to be met by: 2022    Patient will increase functional independence with mobility by performin. Supine to sit with [place]  2. Sit to supine with [place]  3. Sit to stand transfer with [place]  4. Gait  x 200 feet with [place] using No Assistive Device.     Outcome: Ongoing, Progressing

## 2022-01-31 NOTE — ASSESSMENT & PLAN NOTE
Glc >400 for >4 ours. Did not come down after a few extra insulin subq boluses. Given insulin 9 units bolus IV and place insulin drip. Awaiting labs to determine if DKA or HHS or just hyperglycemia. Run NS at 100 mL/h to cover osmotic diuresis.

## 2022-01-31 NOTE — PROGRESS NOTES
Hospital Medicine  Progress note    Team: [facility] [place] HOSP MED Z Adrienne Albright MD  Admit Date: 1/25/2022  ALLIE 2/1/2022  Code status: Full Code    Principal Problem:  Acute metabolic encephalopathy    Interval hx: Responded to IV insulin. Glc now in 200s while off glucose drip. He does not respond well to missing insuluin.     ROS   Respiratory: neg for cough neg for shortness of breath  Cardiovascular: neg for chest pain neg for palpitations  Gastrointestinal: neg for nausea neg for vomiting, neg for abdominal pain neg for diarrhea neg for constipation   Behavioral/Psych: neg for depression neg for anxiety    PEx  Temp:  [97.2 °F (36.2 °C)-98.4 °F (36.9 °C)]   Pulse:  [76-87]   Resp:  [17-18]   BP: (120-157)/(61-78)   SpO2:  [93 %-97 %]     Intake/Output Summary (Last 24 hours) at 1/31/2022 1528  Last data filed at 1/31/2022 0400  Gross per 24 hour   Intake 600 ml   Output 400 ml   Net 200 ml     General Appearance: no acute distress   Heart: regular rate and rhythm, no heave  Respiratory: Normal respiratory effort, symmetric excursion, bilateral vesicular breath sounds   Abdomen: Soft, non-tender; bowel sounds active  Skin: intact, no rash, no ulcers  Neurologic:  No focal numbness or weakness  Mental status: Alert, oriented x 4, affect appropriate     Recent Labs   Lab 01/25/22  1213 01/25/22  1216 01/25/22  2137 01/27/22  0349 01/28/22  0247   WBC 15.48*  --   --  14.87* 11.32   HGB 13.6*  --   --  13.1* 13.1*   HCT 45.5   < > 38 40.1 41.1     --   --  316 252    < > = values in this interval not displayed.     Recent Labs   Lab 01/27/22  0627 01/27/22  1931 01/28/22  0247 01/28/22  0247 01/29/22  0712 01/30/22  2149 01/30/22  2350 01/31/22  0647   NA   < >  --  140   < >  --  139 143 142   K   < >  --  5.1   < >  --  3.9 3.7 4.0   CL   < >  --  106   < >  --  105 106 107   CO2   < >  --  24   < >  --  26 26 25   BUN   < >  --  24*   < >  --  21 20 18   CREATININE   < >  --  1.5*   < >  --  1.3 1.3 1.2   GLU   < >   --  291*   < >  --  122* 79 117*   CALCIUM   < >  --  7.9*   < >  --  8.6* 8.7 8.7   MG  --  1.8 2.1  --  1.9  --   --   --    PHOS   < >  --  2.2*   < >  --  1.6* 2.0* 2.9    < > = values in this interval not displayed.     Recent Labs   Lab 01/25/22  2301 01/26/22  0329 01/27/22  0019 01/27/22  0019 01/27/22  0627 01/27/22  0627 01/28/22  0247 01/28/22  0247 01/30/22  2149 01/30/22  2350 01/31/22  0647   ALKPHOS  --    < > 113  --  120  --  128  --   --   --   --    ALT  --    < > 19  --  22  --  20  --   --   --   --    AST  --    < > 47*  --  49*  --  45*  --   --   --   --    ALBUMIN  --    < > 2.4*   < > 2.5*   < > 2.2*   < > 2.0* 2.0* 2.0*   PROT  --    < > 5.9*  --  6.4  --  6.4  --   --   --   --    BILITOT  --    < > 0.5  --  0.7  --  0.9  --   --   --   --    INR 1.0  --   --   --   --   --   --   --   --   --   --     < > = values in this interval not displayed.        Recent Labs   Lab 01/30/22  2235 01/30/22  2340 01/31/22  0034 01/31/22  0420 01/31/22  0754 01/31/22  1203   POCTGLUCOSE 123* 76 84 75 143* 255*       Scheduled Meds:   amiodarone  200 mg Oral Daily    apixaban  5 mg Oral BID    aspirin  81 mg Oral Daily    atorvastatin  80 mg Oral Daily    clopidogreL  75 mg Oral Daily    diltiaZEM  120 mg Oral Daily    insulin aspart U-100  0-5 Units Subcutaneous TIDWM    insulin aspart U-100  4-7 Units Subcutaneous TIDWM    insulin detemir U-100  10 Units Subcutaneous BID    lacosamide  100 mg Oral Q12H    losartan  25 mg Oral Daily    metoclopramide HCl  10 mg Oral TID AC    metoprolol succinate  50 mg Oral Daily    pantoprazole  40 mg Oral Daily    potassium, sodium phosphates  2 packet Oral QID (WM & HS)    senna-docusate 8.6-50 mg  1 tablet Oral Daily     Continuous Infusions:    As Needed:  aluminum & magnesium hydroxide-simethicone, dextrose 50%, dextrose 50%, hydrALAZINE, labetalol, nitroGLYCERIN, promethazine (PHENERGAN) IVPB, sodium chloride 0.9%    Assessment and Plan  /  Problems managed today    * Acute metabolic encephalopathy  Multifactorial from DKA/HHS and seizures. Improved and baseline. Consider ST for cognitive therapy.    Diabetic ketoacidosis with coma associated with type 2 diabetes mellitus  Patient was debilitated and doing poorly few weeks prior to admission. Poor oral intake coupled with missing doses of medications. A1c 11. Poor history of control  Arrived with acute encephalopathy  PH 7.2 +serum ketones  Likely mixed with hyperosmalar hyperglycemic syndrome as presenting serum gluse in 800s  Treated with insulin drip ad IVF fluids with improvement in mental status.  Developed glucoses on 1/20 in 400s without DKA or HHS. Required IV insulin intensive drip to bring down.  Endocrinology following and managing insulins    Focal seizures  EEG upon admission demonstrated seizures. Resolved with lacosamide loading. Now on lacosamide 100 mg BID.     NSTEMI (non-ST elevated myocardial infarction)  History of recent STEMI 1/9/2021.  Coronary artery disease  [facility] on 1/9/22 with two vessel coronary artery disease with 100% occlusion of mid RCA and 70% stenosis of proximal LAD, RCA stent x2. LAD lesions not intervened on. Cardiology consulted. Placed on ACS protocol with plavix, ASA, and heparin drip. Troponins peaked 1.5 and has been down trending since. NSTEMI likely type II due to DKA. Will continue on clopidogrel 75 mg daily, ASA 81 mg daily, apixaban 5 mg BID.    SIRS due to non-infectious process without acute organ dysfunction  BRENDAN. Leukocytosis and hypoxia. Likely driven by seizures and DKA/HHS. Patient empirically started on broad spectrum antibiotics. Blood cultures negative, UA negative, and CXR negative. Then tailored down to zosyn for presumed infection of sacral wound. However, wound does not look infected. Completed Zosyn for five days.      Nausea & vomiting  Possibly due to uncontrolled glucoses. Seemed improved with metoclopramide. Need better control of  glucoses.    Hyperkalemia  Admitted with K 6.2. Resolved with insulin drip and IVFs and subsequent resolution of BRENDAN    Dysarthria and anarthria  Improving. ST.     BRENDAN (acute kidney injury)  Patient with acute kidney injury likely d/t IVVD/Dehydration and Pre-renal azotemia Which is currently improving. Labs reviewed- Renal function/electrolytes with Estimated Creatinine Clearance: 47.2 mL/min (A) (based on SCr of 1.5 mg/dL (H)). according to latest data. Monitor urine output and serial BMP and adjust therapy as needed. Avoid nephrotoxins and renally dose meds for GFR listed above.     Resolved with IVFs.     Paroxysmal atrial fibrillation  Patient with Paroxysmal (<7 days) atrial fibrillation which is controlled currently with Beta Blocker, Calcium Channel Blocker and Amiodarone. Patient is currently in sinus rhythm.[id number] Score: 4. Anticoagulation indicated. Anticoagulation done with apixaban. Metoprolol XL 50 mg daily, diltiazem  mg daily and amiodarone 200 mg daily.    Embolic stroke involving right middle cerebral artery  Embolic stroke involving left cerebellar artery  Debility  Newly diagnosed 1/12/2022. PT/OT. Family requesting in-patient rehabilitation.   Continue apixaban, atorvastatin , ASA, metoprolol, diltiazem.    Hypertension associated with diabetes  continue losartan 25 mg daily, metoprolol and diltiazem.     Diet:  mechanical diet  GI PPx: protonix  DVT PPx:  apixaban  Airways: 2L NC  Wounds: none    Goals of Care:  Return to prior functional status     Discharge plan: home    Time (minutes) spent in care of the patient (Including face to face contact and coordination of care while on unit)  35 min    Adrienne Albright MD

## 2022-01-31 NOTE — ASSESSMENT & PLAN NOTE
Newly diagnosed 1/12/2022. PT/OT. Family requesting in-patient rehabilitation.   On apixaban, atorvastatin , ASA, metoprolol, diltiazem.  Per primary

## 2022-01-31 NOTE — PROGRESS NOTES
Hospital Medicine  Progress note    Team: [facility] [place] [facility]P MED Z Adrienne Albright MD  Admit Date: 1/25/2022  ALLIE 2/1/2022  Code status: Full Code    Principal Problem:  Acute metabolic encephalopathy    Interval hx: glucoses >400s. Complains of increased nausea    ROS   Respiratory: neg for cough neg for shortness of breath  Cardiovascular: neg for chest pain neg for palpitations  Gastrointestinal: neg for nausea neg for vomiting, neg for abdominal pain neg for diarrhea neg for constipation   Behavioral/Psych: neg for depression neg for anxiety    PEx  Temp:  [96 °F (35.6 °C)-98.4 °F (36.9 °C)]   Pulse:  [78-90]   Resp:  [17-18]   BP: (141-157)/(65-79)   SpO2:  [95 %-97 %]     Intake/Output Summary (Last 24 hours) at 1/30/2022 2138  Last data filed at 1/30/2022 0600  Gross per 24 hour   Intake 240 ml   Output 800 ml   Net -560 ml     General Appearance: no acute distress   Heart: regular rate and rhythm, no heave  Respiratory: Normal respiratory effort, symmetric excursion, bilateral vesicular breath sounds   Abdomen: Soft, non-tender; bowel sounds active  Skin: intact, no rash, no ulcers  Neurologic:  No focal numbness or weakness  Mental status: Alert, oriented x 4, affect appropriate     Recent Labs   Lab 01/25/22  1213 01/25/22  1216 01/25/22  2137 01/27/22 0349 01/28/22  0247   WBC 15.48*  --   --  14.87* 11.32   HGB 13.6*  --   --  13.1* 13.1*   HCT 45.5   < > 38 40.1 41.1     --   --  316 252    < > = values in this interval not displayed.     Recent Labs   Lab 01/26/22  0329 01/27/22  0019 01/27/22  0019 01/27/22  0349 01/27/22  0349 01/27/22  0627 01/27/22  1623 01/27/22  1931 01/28/22  0247 01/29/22  0712   NA  --  144  --   --   --  143  --   --  140  --    K  --  3.0*   < >  --   --  3.4* 3.2*  --  5.1  --    CL  --  107  --   --   --  105  --   --  106  --    CO2  --  23  --   --   --  24  --   --  24  --    BUN  --  32*  --   --   --  29*  --   --  24*  --    CREATININE  --  2.1*  --   --   --  1.7*  --    --  1.5*  --    GLU  --  224*  --   --   --  316*  --   --  291*  --    CALCIUM  --  8.1*  --   --   --  8.3*  --   --  7.9*  --    MG   < >  --   --  1.7   < >  --   --  1.8 2.1 1.9   PHOS  --   --   --  3.0  --   --   --   --  2.2*  --     < > = values in this interval not displayed.     Recent Labs   Lab 01/25/22  2301 01/26/22  0329 01/27/22  0019 01/27/22  0627 01/28/22  0247   ALKPHOS  --    < > 113 120 128   ALT  --    < > 19 22 20   AST  --    < > 47* 49* 45*   ALBUMIN  --    < > 2.4* 2.5* 2.2*   PROT  --    < > 5.9* 6.4 6.4   BILITOT  --    < > 0.5 0.7 0.9   INR 1.0  --   --   --   --     < > = values in this interval not displayed.        Recent Labs   Lab 01/30/22  1108 01/30/22  1423 01/30/22  1704 01/30/22  1812 01/30/22  1927 01/30/22  1928   POCTGLUCOSE 440* >500* 487* 479* 470* 405*       Scheduled Meds:   amiodarone  200 mg Oral Daily    apixaban  5 mg Oral BID    aspirin  81 mg Oral Daily    atorvastatin  80 mg Oral Daily    clopidogreL  75 mg Oral Daily    diltiaZEM  120 mg Oral Daily    insulin detemir U-100  10 Units Subcutaneous BID    insulin regular  9 Units Intravenous Once    lacosamide (VIMPAT) IVPB  100 mg Intravenous Q12H    losartan  25 mg Oral Daily    metoclopramide HCl  5 mg Intravenous Q8H    metoprolol succinate  50 mg Oral Daily    pantoprazole  40 mg Oral Daily    piperacillin-tazobactam (ZOSYN) IVPB  4.5 g Intravenous Q8H    senna-docusate 8.6-50 mg  1 tablet Oral Daily     Continuous Infusions:   sodium chloride 0.9% 100 mL/hr at 01/30/22 2004    insulin regular 1 units/mL infusion orderable (DKA)       As Needed:  aluminum & magnesium hydroxide-simethicone, dextrose 50%, dextrose 50%, hydrALAZINE, labetalol, nitroGLYCERIN, promethazine (PHENERGAN) IVPB, sodium chloride 0.9%    Assessment and Plan  / Problems managed today    * Acute metabolic encephalopathy  Multifactorial from DKA/HHS and seizures. Improved and baseline. Consider ST for cognitive  therapy.    Diabetic ketoacidosis with coma associated with type 2 diabetes mellitus  Patient was debilitated and doing poorly few weeks prior to admission. Poor oral intake coupled with missing doses of medications. A1c 11. Poor history of control  Arrived with acute encephalopathy  PH 7.2 +serum ketones  Likely mixed with hyperosmalar hyperglycemic syndrome as presenting serum gluse in 800s  Treated with insulin drip ad IVF fluids with improvement in mental status.  Endocrinology following and managing insulins    Focal seizures  EEG upon admission demonstrated seizures. Resolved with lacosamide loading. Now on lacosamide 100 mg BID.     NSTEMI (non-ST elevated myocardial infarction)  History of recent STEMI 1/9/2021.  Coronary artery disease  [facility] on 1/9/22 with two vessel coronary artery disease with 100% occlusion of mid RCA and 70% stenosis of proximal LAD, RCA stent x2. LAD lesions not intervened on. Cardiology consulted. Placed on ACS protocol with plavix, ASA, and heparin drip. Troponins peaked 1.5 and has been down trending since. NSTEMI likely type II due to DKA. Will continue on clopidogrel 75 mg daily, ASA 81 mg daily, apixaban 5 mg BID.    Hyperglycemia  Glc >400 for >4 ours. Did not come down after a few extra insulin subq boluses. Given insulin 9 units bolus IV and place insulin drip. Awaiting labs to determine if DKA or HHS or just hyperglycemia. Run NS at 100 mL/h to cover osmotic diuresis.     SIRS due to non-infectious process without acute organ dysfunction  BRENDAN. Leukocytosis and hypoxia. Likely driven by seizures and DKA/HHS. Patient empirically started on broad spectrum antibiotics. Blood cultures negative, UA negative, and CXR negative. Then tailored down to zosyn for presumed infection of sacral wound. However, wound does not look infected. Completed Zosyn for five days.      Nausea & vomiting  Possibly due to uncontrolled glucoses. Seemed improved with metoclopramide. Need better control of  glucoses.    Hyperkalemia  Admitted with K 6.2. Resolved with insulin drip and IVFs and subsequent resolution of BRENDAN    Dysarthria and anarthria  Improving. ST.     BRENDAN (acute kidney injury)  Patient with acute kidney injury likely d/t IVVD/Dehydration and Pre-renal azotemia Which is currently improving. Labs reviewed- Renal function/electrolytes with Estimated Creatinine Clearance: 47.2 mL/min (A) (based on SCr of 1.5 mg/dL (H)). according to latest data. Monitor urine output and serial BMP and adjust therapy as needed. Avoid nephrotoxins and renally dose meds for GFR listed above.     Resolved with IVFs.     Paroxysmal atrial fibrillation  Patient with Paroxysmal (<7 days) atrial fibrillation which is controlled currently with Beta Blocker, Calcium Channel Blocker and Amiodarone. Patient is currently in sinus rhythm.[id number] Score: 4. Anticoagulation indicated. Anticoagulation done with apixaban. Metoprolol XL 50 mg daily, diltiazem  mg daily and amiodarone 200 mg daily.    Embolic stroke involving right middle cerebral artery  Embolic stroke involving left cerebellar artery  Debility  Newly diagnosed 1/12/2022. PT/OT. Family requesting in-patient rehabilitation.   Continue apixaban, atorvastatin , ASA, metoprolol, diltiazem.    Hypertension associated with diabetes  continue losartan 25 mg daily, metoprolol and diltiazem.     Diet:  mechanical diet  GI PPx: protonix  DVT PPx:  apixaban  Airways: 2L NC  Wounds: none    Goals of Care:  Return to prior functional status     Discharge plan: home    Time (minutes) spent in care of the patient (Including face to face contact and coordination of care while on unit)  35 min    Adrienne Albright MD

## 2022-01-31 NOTE — HOSPITAL COURSE
1/30/22: Participated w/ OT. Bed mob CGA. Sit to stand Liz. Ambulated 20 ft Liz. UBD modA. LBD maxA.   1/31/22: SLP mechanical soft and thin.

## 2022-01-31 NOTE — NURSING
Placed call to [facility] and hospitals med z to inform that if pt need accuchecks q hourly that he would have to go to critical care for monitoring due to staffing on current unit. Received secure chat from Dr. Adrienne Albright who said pt can go to stepdown which this is currently stepdown. I spoke with charge nurse Allison OLIVIER to inform and she verbalized that she  Will contact Dr. Albright to clarify.

## 2022-01-31 NOTE — NURSING
POC reviewed with pt and pt verbalized understanding. Questions/Concerns addressed. A&Ox3. Calm/cooperative. NADN. Respirations equal and unlabored. Bed in low position, side rails up x3.  Safety/Fall precautions in place per facility protocol for safety. Instructed pt to press call bell for assistance if needed pt verbalized understanding. VS stable. Neuro assessment completed see assessment. Will continue to monitor closely throughout this [id number] nightshift Safety maintained. Call bell in reach. Bed alarm activated for safety. Took night medications swallows without difficulty. Pts appetite is poor. Bedrest turn Q 2 hours for pressure injury prevention. Continent of B/B. Pt has on condom catheter to  bag to gravity no complications. Pt started on insulin drip see Mar for hyperglycemia received 9unit bolus then drip started at 3 units/hr w/q hourly accuchecks. Insulin drip discontinued @ 2340 due to cbg=76 per Dr. Adrienne Albright. Pt started on 0.9% NS @ 2004 fluids changed @ 2303 to D5/0.45NS @ 100ml/hr to PIV RAC no complications. Will report off to oncoming nursing staff.

## 2022-01-31 NOTE — CONSULTS
Inpatient consult to Physical Medicine Rehab  Consult performed by: Lisa Saldivar NP  Consult ordered by: Adrienne Albright MD      Consult received.  Reviewed patient history and current admission.  PM&R following.  Lisa Saldivar NP  Physical Medicine & Rehabilitation   01/31/2022

## 2022-01-31 NOTE — PROGRESS NOTES
"[facility] ([facility])  Endocrinology  Progress Note    Admit Date: 1/25/2022     Mr. Kamar Muñoz is a 76-year-old-man with a history of CAD, T2DM, HLD, HTN, recent RMCA stroke with LSW, admitted for AMS found to have hemorraghic conversion of recent RMCA infarct and DKA; hospital course c/b R focal seizures and NSTEMI. Endocrinology consulted for "after DKA."     Upon arrival . pH 7.216. Bicarb 7. AG 29. BHB 4.8. UA 1+ ketones. Placed on insulin drip on admission, discontinued 1/26 after bicarb >18 and AG closed. Now on SQ aspart 6 U q4 hrs + MDSSI as well as D51/2 NS. Not tolerating diet well due to ongoing nausea, receiving prn zofran. Recent -->316-->250.   On vimpat for focal seizures. On heparin drip for NSTEMI, no chest pain upon evaluation.      Regarding Diabetes Mellitus:  - Initially diagnosed with Type 2 diabetes mellitus before 2004.   - A1c 11.5% on admission   - Current symptoms: nausea   - Denies:  chest pain, Polyuria/polydipsia, abdominal discomfort, numbness/tingling, visual changes, or subjective weight changes  Wt Readings from Last 5 Encounters:   01/26/22 95.2 kg (209 lb 14.1 oz)   01/19/22 89.7 kg (197 lb 12 oz)   01/14/22 90 kg (198 lb 6.6 oz)   01/10/22 90.3 kg (199 lb)   01/05/22 92.1 kg (203 lb 0.7 oz)     - Pertinent factors: admitted with DKA, managed by [facility].   - Denies: history of pancreatitis, recurrent gallstones, daily alcohol use, MEN syndrome, pancreatic tumors, or gastroparesis  - Known diabetic complications: cerebrovascular disease, neuropathy, CAD, UTIs  - Cardiovascular risk factors: advanced age (older than 55 for men, 65 for women), diabetes mellitus, dyslipidemia, hypertension, and male gender    - Current diabetic medications include:   1. Lantus 17 U qhs  2. Aspart 17 U TID with meals   3. Metformin 500 mg 2 times a day     - Patient now on or previously been on a GLP-1 agonist: no  - Current diet: in general, a "healthy" diet    - Current " "glucose monitoring regimen:  1 x/day  - Any episodes of hypoglycemia? no  - Family Hx:  Denies FH of diabetes or thyroid disorder       Diabetes Management Status  - Statin: atorvastatin 80 mg qd  - ACE/ARB: losartan 25 mg qd  - Seen Optometry/Ophthalmology within last 12 months: no    A complete 14 point review of systems was conducted and negative except for what is stated above.       Interval HPI:   Glucose trend   Previously missed few insulin doses requiring intensive insulin drip over night  Bridged off insulin drip on MDI today  Variable diet, due to nausea  Overnight events: none  Eating:  Variable 0-50%  Nausea: Yes  Hypoglycemia and intervention: No  Fever: No  TPN and/or TF: No  If yes, type of TF/TPN and rate: na    /61 (BP Location: Right arm, Patient Position: Lying)   Pulse 77   Temp 97.3 °F (36.3 °C) (Oral)   Resp 18   Ht 6' 2" (1.88 m)   Wt 95.2 kg (209 lb 14.1 oz)   SpO2 95%   BMI 26.95 kg/m²     Labs Reviewed and Include    Recent Labs   Lab 01/31/22  0647   *   CALCIUM 8.7   ALBUMIN 2.0*      K 4.0   CO2 25      BUN 18   CREATININE 1.2     Lab Results   Component Value Date    WBC 11.32 01/28/2022    HGB 13.1 (L) 01/28/2022    HCT 41.1 01/28/2022    MCV 89 01/28/2022     01/28/2022     Recent Labs   Lab 01/25/22  1213   TSH 0.600     Lab Results   Component Value Date    HGBA1C 11.5 (H) 01/26/2022       Nutritional status:   Body mass index is 26.95 kg/m².  Lab Results   Component Value Date    ALBUMIN 2.0 (L) 01/31/2022    ALBUMIN 2.0 (L) 01/30/2022    ALBUMIN 2.0 (L) 01/30/2022     No results found for: PREALBUMIN    Estimated Creatinine Clearance: 59 mL/min (based on SCr of 1.2 mg/dL).    Accu-Checks  Recent Labs     01/30/22  1704 01/30/22  1812 01/30/22  1927 01/30/22  1928 01/30/22  2235 01/30/22  2340 01/31/22  0034 01/31/22  0420 01/31/22  0754 01/31/22  1203   POCTGLUCOSE 487* 479* 470* 405* 123* 76 84 75 143* 255*       Current Medications " and/or Treatments Impacting Glycemic Control  Immunotherapy:    Immunosuppressants     None        Steroids:   Hormones (From admission, onward)            None        Pressors:    Autonomic Drugs (From admission, onward)            None        Hyperglycemia/Diabetes Medications:   Antihyperglycemics (From admission, onward)            Start     Stop Route Frequency Ordered    01/31/22 1645  insulin aspart U-100 pen 0-5 Units         -- SubQ 3 times daily with meals 01/31/22 1301    01/31/22 1645  insulin aspart U-100 pen 4-7 Units         -- SubQ 3 times daily with meals 01/31/22 1301    01/30/22 1330  insulin detemir U-100 pen 10 Units         -- SubQ 2 times daily 01/30/22 1320          ASSESSMENT and PLAN    * Acute metabolic encephalopathy  Mentation improving      Ketosis-prone diabetes mellitus  Brief Hx:     Presented with Diabetic Ketoacidosis in the setting of hemorraghic conversion of recent RMCA infarct complicated by focal seizures and new NSTEMI.     DKA now resolved  Consulted for:   DKA  DM type:   Ketosis prone T2D  A1C: 11.5 01/26/2022  Hypoglycemia:  none  Steroids:   none  Diet/Tfs:    Variable 0-50%  Glucose Goals:  140-180 mg/dL    Glucose Trend x 24hr:         Home Regimen:    Lantus 17 units at bedtime, aspart 17 units with meals, (mismatched)    mg BID      PLAN:   -  Detemir  10 units BID   -  Aspart  4-7  units TIDWM   -  Low dose correction insulin   -  Accucheck achs2a      Diabetic ketoacidosis with coma associated with type 2 diabetes mellitus  Resolved, management of diabetes as above      Embolic stroke involving left cerebellar artery  Newly diagnosed 1/12/2022. PT/OT. Family requesting in-patient rehabilitation.   On apixaban, atorvastatin , ASA, metoprolol, diltiazem.  Per primary     BRENDAN (acute kidney injury)  -  Avoid insulin stacking  - kidney function showing improvement in the last few days  - management by primary team        Stewart Rashid,  MD  Endocrinology  [facility] ([facility])

## 2022-01-31 NOTE — PT/OT/SLP EVAL
Physical Therapy Evaluation    Patient Name:  Kamar Muñoz   MRN:  [id number]    Recommendations:     Discharge Recommendations:  home   Discharge Equipment Recommendations: other (see comments) (TBd by next level of care or pending pt progress)   Barriers to discharge: Decreased caregiver support    Assessment:     Kamar Muñoz is a 76 y.o. male admitted with a medical diagnosis of Acute metabolic encephalopathy.  He presents with the following impairments/functional limitations:  weakness,impaired endurance,impaired self care skills,impaired functional mobilty,gait instability,impaired balance,impaired sensation,decreased lower extremity function,decreased upper extremity function. Pt would benefit from intensive inpatient rehabilitation for: Dynamic/static standing/sitting balance through skilled balance training, strengthening with the use of skilled therapeutic exercises interventions, mobility and safety training to ensure safe discharge home through skilled patient and caregiver education home management training, mobility through community re-integration training and mobility through adaptive equipment training. Pt highly motivated to return to independent PLOF and can tolerate 3+hours of therapy. Pt continues to benefit from a collaborative PT/OT/SLP program to improve quality of life and focus on recovery of impairments.      Rehab Prognosis: Good; patient would benefit from acute skilled PT services to address these deficits and reach maximum level of function.    Recent Surgery: * No surgery found *      Plan:     During this hospitalization, patient to be seen 4 x/week to address the identified rehab impairments via gait training,therapeutic activities,therapeutic exercises,neuromuscular re-education and progress toward the following goals:    · Plan of Care Expires:  03/02/22    Subjective     Chief Complaint: sacral pain with sitting  Patient/Family Comments/goals: Pt reports the peripheral IV  has been in his arm for weeks.  Pain/Comfort:  · Pain Rating 1: 0/10  · Pain Rating Post-Intervention 1: 0/10    Patients cultural, spiritual, Rastafarian conflicts given the current situation: no    Living Environment:  Pt lives with his wife in a SSH with 1STE. Pt has a WIS with a built in seat.  Prior to admission, patients level of function was independent for all ADLs and functional mobility.  Equipment used at home: none.  DME owned (not currently used): none and but wife has equipment (scooter, QC, rollator, BSC, WC).  Upon discharge, patient will have assistance from unknown.    Objective:     Communicated with RN prior to session.  Patient found left sidelying with peripheral IV  upon PT entry to room.    General Precautions: Standard, fall,aspiration,diabetic,seizure   Orthopedic Precautions:N/A   Braces: N/A  Respiratory Status: Room air    Exams:  · Cognitive Exam:  Patient is oriented to Person, Place, Time and Situation  · Fine Motor Coordination:  BLE, heel/shin, WFL  · Gross Motor Coordination:  WFL  · Postural Exam:  Patient presented with the following abnormalities:    · -       Rounded shoulders  · -       Forward head  · -       Kyphosis  · Sensation:  Neuropathy in B bottom of feet  · RLE ROM: WFL  · RLE Strength: Deficits: grossly 4/5 except knee flexion 4-/5  · LLE ROM: WFL  · LLE Strength: Deficits: grossly 4/5 except knee flexion 4-/5    Functional Mobility:  · Bed Mobility:     · Scooting: stand by assistance  · Supine to Sit: stand by assistance  · Sit to Supine: stand by assistance  · Transfers:     · Sit to Stand:  minimum assistance with no AD and hand-held assist  · Gait: 5 ft, CGA, HHA, mild unsteadiness present, no major LOBs  · Balance:    · Static Sitting: SBA  · Dynamic Sitting: SBA  · Static Standing: CGA  · Dynamic Standing: CGA    Therapeutic Activities and Exercises:  Patient educated on role of therapy, goals of session, and benefits of mobilizing.   Discussed PT plan of  care during hospitalization.   Patient educated on calling for assistance.   Patient educated on how their diagnosis impacts their mobility within PT scope of practice.   Communication board up to date.  All questions answered within PT scope of practice.    AM-PAC 6 CLICK MOBILITY  Total Score:18     Patient left left sidelying with all lines intact, call button in reach and RN notified.    GOALS:   Multidisciplinary Problems     Physical Therapy Goals        Problem: Physical Therapy Goal    Goal Priority Disciplines Outcome Goal Variances Interventions   Physical Therapy Goal     PT, PT/OT Ongoing, Progressing     Description: Goals to be met by: 2022    Patient will increase functional independence with mobility by performin. Supine to sit with [place]  2. Sit to supine with [place]  3. Sit to stand transfer with [place]  4. Gait  x 200 feet with [place] using No Assistive Device.                      History:     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 2012    STEMI involving right coronary artery 2022    Type II or unspecified type diabetes mellitus with neurological manifestations, uncontrolled(250.62) 3/8/2013    Urinary tract infection        Past Surgical History:   Procedure Laterality Date    BACK SURGERY      CATARACT EXTRACTION W/  INTRAOCULAR LENS IMPLANT Right     Per Dr Romero note 2018    COLONOSCOPY N/A 2019    Procedure: COLONOSCOPY Suprep;  Surgeon: Anh Johnson MD;  Location: [facility] ENDO;  Service: Endoscopy;  Laterality: N/A;    EYE SURGERY      HERNIA REPAIR      LEFT HEART CATHETERIZATION Left 2022    Procedure: CATHETERIZATION, HEART, LEFT;  Surgeon: Will Hurst III, MD;  Location: [facility] CATH LAB/EP;  Service: Cardiology;  Laterality: Left;    renal  stones      SHOULDER OPEN ROTATOR CUFF REPAIR         Time Tracking:     PT Received On: 01/31/22  PT Start Time: 1526     PT Stop Time: 1539  PT Total Time (min): 13 min     Billable Minutes: Evaluation 13      01/31/2022

## 2022-01-31 NOTE — PT/OT/SLP PROGRESS
"Speech Language Pathology Treatment    Patient Name:  Kamar Muñoz   MRN:  [id number]  Admitting Diagnosis: Acute metabolic encephalopathy    Recommendations:                 General Recommendations:  Follow-up not indicated  Diet recommendations:  Mechanical soft, Liquid Diet Level: Thin   Aspiration Precautions: HOB to 90 degrees, Meds whole 1 at a time and Standard aspiration precautions   General Precautions: Standard, aspiration,fall  Communication strategies:  none    Subjective     "I really dont know where my dentures are"per pt  Patient goals: home  Pain/Comfort:  ·  no pain reported    Respiratory Status: Room air    Objective:     Has the patient been evaluated by SLP for swallowing?   Yes  Keep patient NPO? No   Current Respiratory Status:        Pt. Seen at bedside self feeding portion of mech soft diet.  Dental plates are not present in room and unable to reach spouse via phone x2 attempts.  Recommendations discussed with pt. And if able to locate dental plates, can advance diet to regular with thin.  Pt. In agreement with plan of care.  No s/s of aspiration noted with mech soft diet with thin liquids  Assessment:     Kamar Muñoz is a 76 y.o. male with no s/s of aspiration.    Goals:   Multidisciplinary Problems     SLP Goals     Not on file          Multidisciplinary Problems (Resolved)        Problem: SLP Goal    Goal Priority Disciplines Outcome   SLP Goal   (Resolved)     SLP Met   Description: Goals due 2/3  1.  Tolerate mech soft diet with thin liquids with no s/s of aspiration  2.  Tolerate trials of regular diet with thin liquids with no s/s of aspiration                   Plan:     · Patient to be seen:  3 x/week   · Plan of Care expires:  02/22/22  · Plan of Care reviewed with:  patient   · SLP Follow-Up:  No       Discharge recommendations:  home   Barriers to Discharge:  None    Time Tracking:     SLP Treatment Date:   01/31/22  Speech Start Time:  0910  Speech Stop Time:  0920   "   Speech Total Time (min):  10 min    Billable Minutes: Treatment Swallowing Dysfunction 10    01/31/2022

## 2022-01-31 NOTE — HPI
Jordy Muñoz is a 76-year-old male with PMHx of CAD, DM, HLD, HTN, recent hospitalization of STEMI with x 2 stent placements on 1/9/2022 and R MCA recent stroke 1/12 with no residual weakness. [place] [facility] [place] L sided weakness and slurred speech. MRI 1/25 No new hemorrhage or infarct identified elsewhere. Per  encephalopathy related multifactorial from DKA/HHS and seizures (improving), BRENDAN (resolved after IVFs), A fib (Metoprolol, Diltiazem, and Amiodarone daily).     Functional History: Patient lives with spouse in a single story home with 1 step to enter.  Prior to admission, SAEED HAJI: graciela

## 2022-01-31 NOTE — SUBJECTIVE & OBJECTIVE
"Interval HPI:   Glucose trend   Previously missed few insulin doses requiring intensive insulin drip over night  Bridged off insulin drip on MDI today  Variable diet, due to nausea  Overnight events: none  Eating:  Variable 0-50%  Nausea: Yes  Hypoglycemia and intervention: No  Fever: No  TPN and/or TF: No  If yes, type of TF/TPN and rate: na    /61 (BP Location: Right arm, Patient Position: Lying)   Pulse 77   Temp 97.3 °F (36.3 °C) (Oral)   Resp 18   Ht 6' 2" (1.88 m)   Wt 95.2 kg (209 lb 14.1 oz)   SpO2 95%   BMI 26.95 kg/m²     Labs Reviewed and Include    Recent Labs   Lab 01/31/22  0647   *   CALCIUM 8.7   ALBUMIN 2.0*      K 4.0   CO2 25      BUN 18   CREATININE 1.2     Lab Results   Component Value Date    WBC 11.32 01/28/2022    HGB 13.1 (L) 01/28/2022    HCT 41.1 01/28/2022    MCV 89 01/28/2022     01/28/2022     Recent Labs   Lab 01/25/22  1213   TSH 0.600     Lab Results   Component Value Date    HGBA1C 11.5 (H) 01/26/2022       Nutritional status:   Body mass index is 26.95 kg/m².  Lab Results   Component Value Date    ALBUMIN 2.0 (L) 01/31/2022    ALBUMIN 2.0 (L) 01/30/2022    ALBUMIN 2.0 (L) 01/30/2022     No results found for: PREALBUMIN    Estimated Creatinine Clearance: 59 mL/min (based on SCr of 1.2 mg/dL).    Accu-Checks  Recent Labs     01/30/22  1704 01/30/22  1812 01/30/22  1927 01/30/22  1928 01/30/22  2235 01/30/22  2340 01/31/22  0034 01/31/22  0420 01/31/22  0754 01/31/22  1203   POCTGLUCOSE 487* 479* 470* 405* 123* 76 84 75 143* 255*       Current Medications and/or Treatments Impacting Glycemic Control  Immunotherapy:    Immunosuppressants     None        Steroids:   Hormones (From admission, onward)            None        Pressors:    Autonomic Drugs (From admission, onward)            None        Hyperglycemia/Diabetes Medications:   Antihyperglycemics (From admission, onward)            Start     Stop Route Frequency Ordered    01/31/22 " 1645  insulin aspart U-100 pen 0-5 Units         -- SubQ 3 times daily with meals 01/31/22 1301    01/31/22 1645  insulin aspart U-100 pen 4-7 Units         -- SubQ 3 times daily with meals 01/31/22 1301    01/30/22 1330  insulin detemir U-100 pen 10 Units         -- SubQ 2 times daily 01/30/22 1320

## 2022-02-01 PROBLEM — R23.9 ALTERATION IN SKIN INTEGRITY: Status: ACTIVE | Noted: 2022-02-01

## 2022-02-01 LAB
ALBUMIN SERPL BCP-MCNC: 1.8 G/DL (ref 3.5–5.2)
ANION GAP SERPL CALC-SCNC: 6 MMOL/L (ref 8–16)
BUN SERPL-MCNC: 15 MG/DL (ref 8–23)
CALCIUM SERPL-MCNC: 8.3 MG/DL (ref 8.7–10.5)
CHLORIDE SERPL-SCNC: 104 MMOL/L (ref 95–110)
CO2 SERPL-SCNC: 27 MMOL/L (ref 23–29)
CREAT SERPL-MCNC: 1 MG/DL (ref 0.5–1.4)
EST. GFR  (AFRICAN AMERICAN): >60 ML/MIN/1.73 M^2
EST. GFR  (NON AFRICAN AMERICAN): >60 ML/MIN/1.73 M^2
GLUCOSE SERPL-MCNC: 177 MG/DL (ref 70–110)
PHOSPHATE SERPL-MCNC: 3.3 MG/DL (ref 2.7–4.5)
POCT GLUCOSE: 183 MG/DL (ref 70–110)
POCT GLUCOSE: 361 MG/DL (ref 70–110)
POCT GLUCOSE: 394 MG/DL (ref 70–110)
POCT GLUCOSE: 395 MG/DL (ref 70–110)
POCT GLUCOSE: 450 MG/DL (ref 70–110)
POTASSIUM SERPL-SCNC: 3.8 MMOL/L (ref 3.5–5.1)
SODIUM SERPL-SCNC: 137 MMOL/L (ref 136–145)

## 2022-02-01 PROCEDURE — 99233 PR SUBSEQUENT HOSPITAL CARE,LEVL III: ICD-10-PCS | Mod: ,,, | Performed by: INTERNAL MEDICINE

## 2022-02-01 PROCEDURE — 25000003 PHARM REV CODE 250: Performed by: INTERNAL MEDICINE

## 2022-02-01 PROCEDURE — 25000003 PHARM REV CODE 250

## 2022-02-01 PROCEDURE — 36415 COLL VENOUS BLD VENIPUNCTURE: CPT | Performed by: INTERNAL MEDICINE

## 2022-02-01 PROCEDURE — 99232 SBSQ HOSP IP/OBS MODERATE 35: CPT | Mod: ,,, | Performed by: NURSE PRACTITIONER

## 2022-02-01 PROCEDURE — 99232 PR SUBSEQUENT HOSPITAL CARE,LEVL II: ICD-10-PCS | Mod: ,,, | Performed by: NURSE PRACTITIONER

## 2022-02-01 PROCEDURE — 99233 SBSQ HOSP IP/OBS HIGH 50: CPT | Mod: ,,, | Performed by: INTERNAL MEDICINE

## 2022-02-01 PROCEDURE — 99232 PR SUBSEQUENT HOSPITAL CARE,LEVL II: ICD-10-PCS | Mod: GC,,, | Performed by: GENERAL ACUTE CARE HOSPITAL

## 2022-02-01 PROCEDURE — 80069 RENAL FUNCTION PANEL: CPT | Performed by: INTERNAL MEDICINE

## 2022-02-01 PROCEDURE — 25000003 PHARM REV CODE 250: Performed by: PSYCHIATRY & NEUROLOGY

## 2022-02-01 PROCEDURE — 11000001 HC ACUTE MED/SURG PRIVATE ROOM

## 2022-02-01 PROCEDURE — 99232 SBSQ HOSP IP/OBS MODERATE 35: CPT | Mod: GC,,, | Performed by: GENERAL ACUTE CARE HOSPITAL

## 2022-02-01 RX ORDER — INSULIN ASPART 100 [IU]/ML
7 INJECTION, SOLUTION INTRAVENOUS; SUBCUTANEOUS
Status: DISCONTINUED | OUTPATIENT
Start: 2022-02-01 | End: 2022-02-02

## 2022-02-01 RX ADMIN — POTASSIUM & SODIUM PHOSPHATES POWDER PACK 280-160-250 MG 2 PACKET: 280-160-250 PACK at 09:02

## 2022-02-01 RX ADMIN — POTASSIUM & SODIUM PHOSPHATES POWDER PACK 280-160-250 MG 2 PACKET: 280-160-250 PACK at 06:02

## 2022-02-01 RX ADMIN — APIXABAN 5 MG: 5 TABLET, FILM COATED ORAL at 09:02

## 2022-02-01 RX ADMIN — DILTIAZEM HYDROCHLORIDE 120 MG: 120 CAPSULE, COATED, EXTENDED RELEASE ORAL at 08:02

## 2022-02-01 RX ADMIN — ATORVASTATIN CALCIUM 80 MG: 40 TABLET, FILM COATED ORAL at 08:02

## 2022-02-01 RX ADMIN — AMIODARONE HYDROCHLORIDE 200 MG: 200 TABLET ORAL at 09:02

## 2022-02-01 RX ADMIN — POTASSIUM & SODIUM PHOSPHATES POWDER PACK 280-160-250 MG 2 PACKET: 280-160-250 PACK at 01:02

## 2022-02-01 RX ADMIN — INSULIN ASPART 7 UNITS: 100 INJECTION, SOLUTION INTRAVENOUS; SUBCUTANEOUS at 05:02

## 2022-02-01 RX ADMIN — METOCLOPRAMIDE 10 MG: 5 TABLET ORAL at 06:02

## 2022-02-01 RX ADMIN — INSULIN ASPART 4 UNITS: 100 INJECTION, SOLUTION INTRAVENOUS; SUBCUTANEOUS at 06:02

## 2022-02-01 RX ADMIN — CLOPIDOGREL 75 MG: 75 TABLET, FILM COATED ORAL at 08:02

## 2022-02-01 RX ADMIN — LACOSAMIDE 100 MG: 100 TABLET, FILM COATED ORAL at 08:02

## 2022-02-01 RX ADMIN — POTASSIUM & SODIUM PHOSPHATES POWDER PACK 280-160-250 MG 2 PACKET: 280-160-250 PACK at 05:02

## 2022-02-01 RX ADMIN — INSULIN ASPART 2 UNITS: 100 INJECTION, SOLUTION INTRAVENOUS; SUBCUTANEOUS at 10:02

## 2022-02-01 RX ADMIN — LACOSAMIDE 100 MG: 100 TABLET, FILM COATED ORAL at 09:02

## 2022-02-01 RX ADMIN — DOCUSATE SODIUM 50 MG AND SENNOSIDES 8.6 MG 1 TABLET: 8.6; 5 TABLET, FILM COATED ORAL at 08:02

## 2022-02-01 RX ADMIN — PANTOPRAZOLE SODIUM 40 MG: 40 GRANULE, DELAYED RELEASE ORAL at 08:02

## 2022-02-01 RX ADMIN — INSULIN DETEMIR 10 UNITS: 100 INJECTION, SOLUTION SUBCUTANEOUS at 09:02

## 2022-02-01 RX ADMIN — INSULIN ASPART 5 UNITS: 100 INJECTION, SOLUTION INTRAVENOUS; SUBCUTANEOUS at 05:02

## 2022-02-01 RX ADMIN — METOCLOPRAMIDE 10 MG: 5 TABLET ORAL at 11:02

## 2022-02-01 RX ADMIN — INSULIN ASPART 7 UNITS: 100 INJECTION, SOLUTION INTRAVENOUS; SUBCUTANEOUS at 01:02

## 2022-02-01 RX ADMIN — APIXABAN 5 MG: 5 TABLET, FILM COATED ORAL at 08:02

## 2022-02-01 NOTE — SUBJECTIVE & OBJECTIVE
Interval History 2/1/2022:  Patient is seen for follow-up PM&R evaluation and recommendations: Participating with therapy.     HPI, Past Medical, Family, and Social History remains the same as documented in the initial encounter.    Scheduled Medications:    amiodarone  200 mg Oral Daily    apixaban  5 mg Oral BID    aspirin  81 mg Oral Daily    atorvastatin  80 mg Oral Daily    clopidogreL  75 mg Oral Daily    diltiaZEM  120 mg Oral Daily    insulin aspart U-100  0-5 Units Subcutaneous TIDWM    insulin aspart U-100  7 Units Subcutaneous TIDWM    insulin detemir U-100  10 Units Subcutaneous BID    lacosamide  100 mg Oral Q12H    losartan  25 mg Oral Daily    metoclopramide HCl  10 mg Oral TID AC    metoprolol succinate  50 mg Oral Daily    pantoprazole  40 mg Oral Daily    potassium, sodium phosphates  2 packet Oral QID (WM & HS)    senna-docusate 8.6-50 mg  1 tablet Oral Daily       Diagnostic Results: Labs: Reviewed    PRN Medications: aluminum & magnesium hydroxide-simethicone, dextrose 50%, dextrose 50%, hydrALAZINE, labetalol, nitroGLYCERIN, promethazine (PHENERGAN) IVPB, sodium chloride 0.9%    Review of Systems   Constitutional: Positive for activity change. Negative for fatigue and fever.   HENT: Negative for trouble swallowing and voice change.    Respiratory: Negative for cough and shortness of breath.    Cardiovascular: Negative for chest pain and leg swelling.   Gastrointestinal: Negative for abdominal distention and abdominal pain.   Genitourinary: Negative for difficulty urinating and flank pain.   Musculoskeletal: Positive for gait problem. Negative for back pain.   Skin: Negative for color change and rash.   Neurological: Positive for weakness. Negative for speech difficulty and numbness.   Psychiatric/Behavioral: Negative for agitation and confusion.     Objective:     Vital Signs (Most Recent):  Temp: 97.3 °F (36.3 °C) (02/01/22 1251)  Pulse: 98 (02/01/22 1251)  Resp: 18 (02/01/22  1251)  BP: 121/65 (02/01/22 1251)  SpO2: (!) 91 % (02/01/22 0742)    Vital Signs (24h Range):  Temp:  [97.3 °F (36.3 °C)-98.3 °F (36.8 °C)] 97.3 °F (36.3 °C)  Pulse:  [80-98] 98  Resp:  [18] 18  SpO2:  [91 %-93 %] 91 %  BP: (114-145)/(57-94) 121/65     Physical Exam  Vitals and nursing note reviewed.   Constitutional:       Appearance: He is well-developed.   HENT:      Head: Normocephalic and atraumatic.      Mouth/Throat:      Mouth: Mucous membranes are moist.   Eyes:      General:         Right eye: No discharge.         Left eye: No discharge.      Pupils: Pupils are equal, round, and reactive to light.   Pulmonary:      Effort: Pulmonary effort is normal. No respiratory distress.   Abdominal:      General: There is no distension.      Tenderness: There is no abdominal tenderness.   Musculoskeletal:         General: No deformity.      Cervical back: Neck supple.      Comments: Mild generalized weakness   Skin:     General: Skin is warm and dry.   Neurological:      Mental Status: Mental status is at baseline.      Sensory: No sensory deficit.      Motor: No abnormal muscle tone.   Psychiatric:         Mood and Affect: Mood normal.         Behavior: Behavior normal.       NEUROLOGICAL EXAMINATION:     CRANIAL NERVES     CN III, IV, VI   Pupils are equal, round, and reactive to light.

## 2022-02-01 NOTE — ASSESSMENT & PLAN NOTE
- evaluation of left buttocks appears as a ruptured blister with small area of black eschar, poa yes.  - will follow wound progression.  - continue Triad bid/prn cleaning.  - waffle overlay and chair cushion ordered.  - pt able to change positions independently but reports pain to buttocks when laying or sitting.  - nursing to maintain pressure injury prevention measures.

## 2022-02-01 NOTE — PLAN OF CARE
Problem: Diabetes Comorbidity  Goal: Blood Glucose Level Within Targeted Range  Outcome: Ongoing, Progressing     Problem: Adult Inpatient Plan of Care  Goal: Optimal Comfort and Wellbeing  Outcome: Ongoing, Progressing   Patient aaox4, amb, vss, follows command, amb to bathroom with walker  no c/o at this time, bg stable, not eating well ordered boost will cont to monitor.

## 2022-02-01 NOTE — CONSULTS
[facility] ([facility])  Skin Integrity [facility]  Consult Note    Patient Name: Kamar Muñoz  MRN: [id number]  Admission Date: 1/25/2022  Hospital Length of Stay: 7 days  Attending Physician: Adrienne Albright MD  Primary Care Provider: Basim Guerrero MD     Consults  Subjective:     History of Present Illness:  Per chart review, Mr. Kamar Muñoz is a 76 year old male with a history of CAD, type 2 diabetes, hyperlipidemia, hypertension, and recent stroke without residual deficits who presented with slurred speech, left facial droop and left-sided weakness via EMS. They were called for change in mental status and vomiting. Wound care has been consulted for buttocks skin lesions.       Scheduled Meds:   amiodarone  200 mg Oral Daily    apixaban  5 mg Oral BID    aspirin  81 mg Oral Daily    atorvastatin  80 mg Oral Daily    clopidogreL  75 mg Oral Daily    diltiaZEM  120 mg Oral Daily    insulin aspart U-100  0-5 Units Subcutaneous TIDWM    insulin aspart U-100  7 Units Subcutaneous TIDWM    insulin detemir U-100  10 Units Subcutaneous BID    lacosamide  100 mg Oral Q12H    losartan  25 mg Oral Daily    metoclopramide HCl  10 mg Oral TID AC    metoprolol succinate  50 mg Oral Daily    pantoprazole  40 mg Oral Daily    potassium, sodium phosphates  2 packet Oral QID (WM & HS)    senna-docusate 8.6-50 mg  1 tablet Oral Daily     Continuous Infusions:  PRN Meds:aluminum & magnesium hydroxide-simethicone, dextrose 50%, dextrose 50%, hydrALAZINE, labetalol, nitroGLYCERIN, promethazine (PHENERGAN) IVPB, sodium chloride 0.9%    Review of patient's allergies indicates:   Allergen Reactions    Iodine      Other reaction(s): swelling  Other reaction(s): Itching  Other reaction(s): Rash        Past Medical History:   Diagnosis Date    Arthritis     Coronary artery disease     Diabetes mellitus type II     Hyperlipidemia     Hypertension     Kidney stone     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 2012    STEMI involving right coronary artery 2022    Type II or unspecified type diabetes mellitus with neurological manifestations, uncontrolled(250.62) 3/8/2013    Urinary tract infection      Past Surgical History:   Procedure Laterality Date    BACK SURGERY      CATARACT EXTRACTION W/  INTRAOCULAR LENS IMPLANT Right     Per Dr Romero note 2018    COLONOSCOPY N/A 2019    Procedure: COLONOSCOPY Suprep;  Surgeon: Anh Johnson MD;  Location: [facility] ENDO;  Service: Endoscopy;  Laterality: N/A;    EYE SURGERY      HERNIA REPAIR      LEFT HEART CATHETERIZATION Left 2022    Procedure: CATHETERIZATION, HEART, LEFT;  Surgeon: Will Hurst III, MD;  Location: [facility] CATH LAB/EP;  Service: Cardiology;  Laterality: Left;    renal stones      SHOULDER OPEN ROTATOR CUFF REPAIR         Family History    None       Tobacco Use    Smoking status: Former Smoker     Packs/day: 1.50     Years: 25.00     Pack years: 37.50     Quit date: 1983     Years since quittin.1    Smokeless tobacco: Never Used   Substance and Sexual Activity    Alcohol use: No    Drug use: No    Sexual activity: Yes     Partners: Female     Review of Systems   Skin: Positive for wound.       Objective:     Vital Signs (Most Recent):  Temp: 97.3 °F (36.3 °C) (22 1251)  Pulse: 98 (22 1251)  Resp: 18 (22 1251)  BP: 121/65 (22 1251)  SpO2: (!) 91 % (22 0742) Vital Signs (24h Range):  Temp:  [97.3 °F (36.3 °C)-98.3 °F (36.8 °C)] 97.3 °F (36.3 °C)  Pulse:  [80-98] 98  Resp:  [18] 18  SpO2:  [91 %-93 %] 91 %  BP: (114-145)/(57-94) 121/65     Weight: 95.2 kg (209 lb 14.1 oz)  Body mass index is 26.95 kg/m².  Physical Exam  Constitutional:       Appearance: Normal appearance.   Skin:     General: Skin is warm and dry.      Findings: Lesion present.   Neurological:      Mental Status: He is alert.         Laboratory:  All pertinent labs reviewed within the last 24  hours.    Diagnostic Results:  None      Assessment/Plan:         CONRAD Skin Integrity Evaluation    Skin Integrity CONRAD evaluation of patient as part of the comprehensive skin care team.     He has been admitted for 7 days. Skin injury was noted on 1/26/22. POA yes. PT and RD are involved in his care.                 Alteration in skin integrity  - evaluation of left buttocks appears as a ruptured blister with small area of black eschar, poa yes.  - will follow wound progression.  - continue Triad bid/prn cleaning.  - waffle overlay and chair cushion ordered.  - pt able to change positions independently but reports pain to buttocks when laying or sitting.  - nursing to maintain pressure injury prevention measures.         Thank you for your consult. I will follow-up with patient. Please contact us if you have any additional questions.         Ashleigh Tesfaye NP  Skin Integrity [facility]  [facility] ([facility])

## 2022-02-01 NOTE — PROGRESS NOTES
"Ch[facility] Graham - Neurosurgery ([facility])  Endocrinology  Progress Note    Admit Date: 1/25/2022     Mr. Kamar Muñoz is a 76-year-old-man with a history of CAD, T2DM, HLD, HTN, recent RMCA stroke with LSW, admitted for AMS found to have hemorraghic conversion of recent RMCA infarct and DKA; hospital course c/b R focal seizures and NSTEMI. Endocrinology consulted for "after DKA."     Upon arrival . pH 7.216. Bicarb 7. AG 29. BHB 4.8. UA 1+ ketones. Placed on insulin drip on admission, discontinued 1/26 after bicarb >18 and AG closed. Now on SQ aspart 6 U q4 hrs + MDSSI as well as D51/2 NS. Not tolerating diet well due to ongoing nausea, receiving prn zofran. Recent -->316-->250.   On vimpat for focal seizures. On heparin drip for NSTEMI, no chest pain upon evaluation.      Regarding Diabetes Mellitus:  - Initially diagnosed with Type 2 diabetes mellitus before 2004.   - A1c 11.5% on admission   - Current symptoms: nausea   - Denies:  chest pain, Polyuria/polydipsia, abdominal discomfort, numbness/tingling, visual changes, or subjective weight changes  Wt Readings from Last 5 Encounters:   01/26/22 95.2 kg (209 lb 14.1 oz)   01/19/22 89.7 kg (197 lb 12 oz)   01/14/22 90 kg (198 lb 6.6 oz)   01/10/22 90.3 kg (199 lb)   01/05/22 92.1 kg (203 lb 0.7 oz)     - Pertinent factors: admitted with DKA, managed by [facility].   - Denies: history of pancreatitis, recurrent gallstones, daily alcohol use, MEN syndrome, pancreatic tumors, or gastroparesis  - Known diabetic complications: cerebrovascular disease, neuropathy, CAD, UTIs  - Cardiovascular risk factors: advanced age (older than 55 for men, 65 for women), diabetes mellitus, dyslipidemia, hypertension, and male gender    - Current diabetic medications include:   1. Lantus 17 U qhs  2. Aspart 17 U TID with meals   3. Metformin 500 mg 2 times a day     - Patient now on or previously been on a GLP-1 agonist: no  - Current diet: in general, a "healthy" diet    - Current " "glucose monitoring regimen:  1 x/day  - Any episodes of hypoglycemia? no  - Family Hx:  Denies FH of diabetes or thyroid disorder       Diabetes Management Status  - Statin: atorvastatin 80 mg qd  - ACE/ARB: losartan 25 mg qd  - Seen Optometry/Ophthalmology within last 12 months: no    A complete 14 point review of systems was conducted and negative except for what is stated above.       Interval HPI:   Glucose trend  at goal today  Tolerating diet   Overnight events: none  Eatin%  Nausea: No  Hypoglycemia and intervention: No  Fever: No  TPN and/or TF: No  If yes, type of TF/TPN and rate: na    BP (!) 114/57   Pulse 80   Temp 98.1 °F (36.7 °C)   Resp 18   Ht 6' 2" (1.88 m)   Wt 95.2 kg (209 lb 14.1 oz)   SpO2 (!) 91%   BMI 26.95 kg/m²     Labs Reviewed and Include    Recent Labs   Lab 22  0620   *   CALCIUM 8.3*   ALBUMIN 1.8*      K 3.8   CO2 27      BUN 15   CREATININE 1.0     Lab Results   Component Value Date    WBC 11.32 2022    HGB 13.1 (L) 2022    HCT 41.1 2022    MCV 89 2022     2022     No results for input(s): TSH, FREET4 in the last 168 hours.  Lab Results   Component Value Date    HGBA1C 11.5 (H) 2022       Nutritional status:   Body mass index is 26.95 kg/m².  Lab Results   Component Value Date    ALBUMIN 1.8 (L) 2022    ALBUMIN 2.0 (L) 2022    ALBUMIN 2.0 (L) 2022     No results found for: PREALBUMIN    Estimated Creatinine Clearance: 70.8 mL/min (based on SCr of 1 mg/dL).    Accu-Checks  Recent Labs     22  1928 22  2235 22  2340 22  0034 22  0420 22  0754 22  1203 22  1547 22  2005 22  0615   POCTGLUCOSE 405* 123* 76 84 75 143* 255* 183* 162* 183*       Current Medications and/or Treatments Impacting Glycemic Control  Immunotherapy:    Immunosuppressants     None        Steroids:   Hormones (From admission, onward)            None    "     Pressors:    Autonomic Drugs (From admission, onward)            None        Hyperglycemia/Diabetes Medications:   Antihyperglycemics (From admission, onward)            Start     Stop Route Frequency Ordered    02/01/22 1130  insulin aspart U-100 pen 7 Units         -- SubQ 3 times daily with meals 02/01/22 0937    01/31/22 1645  insulin aspart U-100 pen 0-5 Units         -- SubQ 3 times daily with meals 01/31/22 1301    01/30/22 1330  insulin detemir U-100 pen 10 Units         -- SubQ 2 times daily 01/30/22 1320          ASSESSMENT and PLAN    * Acute metabolic encephalopathy  Mentation improving      Ketosis-prone diabetes mellitus  Brief Hx:     Presented with Diabetic Ketoacidosis in the setting of hemorraghic conversion of recent RMCA infarct complicated by focal seizures and new NSTEMI.     DKA now resolved  Consulted for:   DKA  DM type:   Ketosis prone T2D  A1C: 11.5 01/26/2022  Hypoglycemia:  none  Steroids:   none  Diet/Tfs:    Variable >50%  Glucose Goals:  140-180 mg/dL    Glucose Trend x 24hr:         Home Regimen:    Lantus 17 units at bedtime, aspart 17 units with meals, (mismatched)    mg BID      PLAN:   -  Detemir 10 units BID   -  Aspart  7  units TIDWM   -  Low dose correction insulin   -  Accucheck achs2a      Diabetic ketoacidosis with coma associated with type 2 diabetes mellitus  Resolved, management of diabetes as above    Embolic stroke involving left cerebellar artery  Newly diagnosed 1/12/2022. PT/OT. Family requesting in-patient rehabilitation.   On apixaban, atorvastatin , ASA, metoprolol, diltiazem.  Per primary     BRENDAN (acute kidney injury)  -  Avoid insulin stacking  -   improving  - management by primary team        Stewart Rashid MD  Endocrinology  [facility] ([facility])

## 2022-02-01 NOTE — HPI
Per chart review, Mr. Kamar Muñoz is a 76 year old male with a history of CAD, type 2 diabetes, hyperlipidemia, hypertension, and recent stroke without residual deficits who presented with slurred speech, left facial droop and left-sided weakness via EMS. They were called for change in mental status and vomiting. Wound care has been consulted for buttocks skin lesions.

## 2022-02-01 NOTE — PROGRESS NOTES
Chase Graham - Neurosurgery ([facility])  Physical Medicine & Rehab  Progress Note    Patient Name: Kamar Muñoz  MRN: [id number]  Admission Date: 1/25/2022  Length of Stay: 7 days  Attending Physician: Adrienne Albright MD    Subjective:     Principal Problem:Acute metabolic encephalopathy    Hospital Course:   1/30/22: Participated w/ OT. Bed mob CGA. Sit to stand Liz. Ambulated 20 ft Liz. UBD modA. LBD maxA.   1/31/22: SLP mechanical soft and thin.       Interval History 2/1/2022:  Patient is seen for follow-up PM&R evaluation and recommendations: Participating with therapy.     HPI, Past Medical, Family, and Social History remains the same as documented in the initial encounter.    Scheduled Medications:    amiodarone  200 mg Oral Daily    apixaban  5 mg Oral BID    aspirin  81 mg Oral Daily    atorvastatin  80 mg Oral Daily    clopidogreL  75 mg Oral Daily    diltiaZEM  120 mg Oral Daily    insulin aspart U-100  0-5 Units Subcutaneous TIDWM    insulin aspart U-100  7 Units Subcutaneous TIDWM    insulin detemir U-100  10 Units Subcutaneous BID    lacosamide  100 mg Oral Q12H    losartan  25 mg Oral Daily    metoclopramide HCl  10 mg Oral TID AC    metoprolol succinate  50 mg Oral Daily    pantoprazole  40 mg Oral Daily    potassium, sodium phosphates  2 packet Oral QID (WM & HS)    senna-docusate 8.6-50 mg  1 tablet Oral Daily       Diagnostic Results: Labs: Reviewed    PRN Medications: aluminum & magnesium hydroxide-simethicone, dextrose 50%, dextrose 50%, hydrALAZINE, labetalol, nitroGLYCERIN, promethazine (PHENERGAN) IVPB, sodium chloride 0.9%    Review of Systems   Constitutional: Positive for activity change. Negative for fatigue and fever.   HENT: Negative for trouble swallowing and voice change.    Respiratory: Negative for cough and shortness of breath.    Cardiovascular: Negative for chest pain and leg swelling.   Gastrointestinal: Negative for abdominal distention and abdominal pain.    Genitourinary: Negative for difficulty urinating and flank pain.   Musculoskeletal: Positive for gait problem. Negative for back pain.   Skin: Negative for color change and rash.   Neurological: Positive for weakness. Negative for speech difficulty and numbness.   Psychiatric/Behavioral: Negative for agitation and confusion.     Objective:     Vital Signs (Most Recent):  Temp: 97.3 °F (36.3 °C) (02/01/22 1251)  Pulse: 98 (02/01/22 1251)  Resp: 18 (02/01/22 1251)  BP: 121/65 (02/01/22 1251)  SpO2: (!) 91 % (02/01/22 0742)    Vital Signs (24h Range):  Temp:  [97.3 °F (36.3 °C)-98.3 °F (36.8 °C)] 97.3 °F (36.3 °C)  Pulse:  [80-98] 98  Resp:  [18] 18  SpO2:  [91 %-93 %] 91 %  BP: (114-145)/(57-94) 121/65     Physical Exam  Vitals and nursing note reviewed.   Constitutional:       Appearance: He is well-developed.   HENT:      Head: Normocephalic and atraumatic.      Mouth/Throat:      Mouth: Mucous membranes are moist.   Eyes:      General:         Right eye: No discharge.         Left eye: No discharge.      Pupils: Pupils are equal, round, and reactive to light.   Pulmonary:      Effort: Pulmonary effort is normal. No respiratory distress.   Abdominal:      General: There is no distension.      Tenderness: There is no abdominal tenderness.   Musculoskeletal:         General: No deformity.      Cervical back: Neck supple.      Comments: Mild generalized weakness   Skin:     General: Skin is warm and dry.   Neurological:      Mental Status: Mental status is at baseline.      Sensory: No sensory deficit.      Motor: No abnormal muscle tone.   Psychiatric:         Mood and Affect: Mood normal.         Behavior: Behavior normal.       Assessment/Plan:      Focal seizures  - on Lamictal PO Q12    BRENDAN (acute kidney injury)  - resolved after IVFs    Embolic stroke involving right middle cerebral artery  - Related to prolonged/acute hospital course.     Recommendations  -  Encourage mobility, OOB in chair at least 3 hours  per day, and early ambulation as appropriate  -  PT/OT evaluate and treat  -  Pain management  -  Monitor for and prevent skin breakdown and pressure ulcers  · Early mobility, repositioning/weight shifting every 20-30 minutes when sitting, turn patient every 2 hours, proper mattress/overlay and chair cushioning, pressure relief/heel protector boots  -  DVT prophylaxis    -  Reviewed discharge options (IP rehab, SNF, HH therapy, and OP therapy)    PM&R Recommendation:     At this time, the PM&R team has reviewed this patient's ongoing medical case including inpatient diagnosis, medical history, clinical examination, labs, vitals, current social and functional history to provide the post-acute recommendation as follows:     RECOMMENDATIONS: inpatient rehabilitation due to good motivation/participation with therapies and good potential for recovery.    MEDICAL STABILITY:     At this time, no barriers for post-acute rehab admission.     I will sign off with the transfer of the patient to [facility] liaison who will continue to follow going forward until admission to [facility].     Shahida Wellington NP  Department of Physical Medicine & Rehab   [facility])

## 2022-02-01 NOTE — PROGRESS NOTES
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     Social Determinants of Health     Financial Resource Strain: Low Risk     Difficulty of Paying Living Expenses: Not hard at all   Food Insecurity: No Food Insecurity    Worried About Running Out of Food in the Last Year: Never true    Ran Out of Food in the Last Year: Never true   Transportation Needs: No Transportation Needs    Lack of Transportation (Medical): No    Lack of Transportation (Non-Medical): No   Housing Stability: Low Risk     Unable to Pay for Housing in the Last Year: No    Number of Places Lived in the Last Year: 1    Unstable Housing in the Last Year: No       Precautions:     Allergies as of 2022 - Reviewed 2022   Allergen Reaction Noted    Iodine  2012       [facility] Assessment Details/Treatment   Patient seen for new consult for sacral area. Patient was admitted on  where the area was first noted. There is now an area of black eschar to the  inner right glut. Between the glutes there appears to be some red broken skin and the other ski is intact around these 2 areas but appears blistered and as if it will peel off. Triad ointment is in use and patient is laying on his side. He states the area if painful so he does not lay on it. He is independent in position changes. Discussed staying off of the area to relieve pressure and assist with healing. Patient also states he sat up in the chair today which also hurt the area. Ordered waffle overlay for bed and static air cushion for the chair.   Discussed about with patient and nurse.                                                                                                                                                                                                                                                                                                                                                                                                                                                                                                                                                                                                                                                                                                                                                                                                                                                                                                                                                                                                                                                                                                                                                                                                                                                                                                                                                                                                                                                                                                                                                                                                                                                                                                                      01/31/22 2015        Altered Skin Integrity 01/26/22 0841 Sacral spine Purple or maroon localized area of discolored intact skin or non-intact skin or a blood-filled blister.   Date First Assessed/Time First Assessed: 01/26/22 0841   Altered Skin Integrity Present on Admission: yes  Location: Sacral spine  Description of Altered Skin Integrity: Purple or maroon localized area of discolored intact skin or non-intact skin or a...   Wound Image     Drainage Amount None   Drainage Characteristics/Odor No odor   Appearance Pink;Maroon;Black;Intact;Eschar;Blistered   Tissue loss description Full thickness   Black (%), Wound Tissue Color 15 %   Red (%), Wound Tissue Color 50 %   Yellow (%), Wound Tissue Color 35 %   Periwound Area Intact;[place]   Wound Edges Irregular   Wound Length (cm) 5 cm   Wound Width (cm) 6 cm   Wound Depth (cm) 0.1 cm   Wound Volume (cm^3) 3 cm^3   Wound Surface Area (cm^2) 30 cm^2   Dressing Applied  (triad)   01/31/2022

## 2022-02-01 NOTE — PLAN OF CARE
Problem: Adult Inpatient Plan of Care  Goal: Plan of Care Review  Outcome: Ongoing, Progressing  Goal: Patient-Specific Goal (Individualized)  Outcome: Ongoing, Progressing  Goal: Absence of Hospital-Acquired Illness or Injury  Outcome: Ongoing, Progressing  Goal: Optimal Comfort and Wellbeing  Outcome: Ongoing, Progressing  Goal: Readiness for Transition of Care  Outcome: Ongoing, Progressing   POC reviewed and updated with the patient. Questions regarding POC were encouraged and addressed with the patient. VSS, see flow-sheets. Patient is AO X *4 at this time. Fall/safety precautions maintained, no signs of injury noted during shift. Patient was repositioned for comfort, bed locked in low position with side rails X *3, and with call light within reach. Patient instructed to call staff for mobility, verbalized understanding. No acute signs of distress noted at this time.

## 2022-02-01 NOTE — PT/OT/SLP PROGRESS
Occupational Therapy      Patient Name:  Kamar Muñoz   MRN:  [id number]    Patient not seen today secondary to patient politely declining participation upon pm attempt and therapist unable to return; reporting feeling nauseated and very fatigued. Will follow-up per schedule.    2/1/2022

## 2022-02-01 NOTE — SUBJECTIVE & OBJECTIVE
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 8/2/2012    STEMI involving right coronary artery 1/9/2022    Type II or unspecified type diabetes mellitus with neurological manifestations, uncontrolled(250.62) 3/8/2013    Urinary tract infection      Past Surgical History:   Procedure Laterality Date    BACK SURGERY      CATARACT EXTRACTION W/  INTRAOCULAR LENS IMPLANT Right     Per Dr Romero note 11/2018    COLONOSCOPY N/A 1/28/2019    Procedure: COLONOSCOPY Suprep;  Surgeon: Anh Johnson MD;  Location: [facility] ENDO;  Service: Endoscopy;  Laterality: N/A;    EYE SURGERY      HERNIA REPAIR      LEFT HEART CATHETERIZATION Left 1/9/2022    Procedure: CATHETERIZATION, HEART, LEFT;  Surgeon: Will Hurst III, MD;  Location: [facility] CATH LAB/EP;  Service: Cardiology;  Laterality: Left;    renal stones      SHOULDER OPEN ROTATOR CUFF REPAIR       Review of patient's allergies indicates:   Allergen Reactions    Iodine      Other reaction(s): swelling  Other reaction(s): Itching  Other reaction(s): Rash       Scheduled Medications:    amiodarone  200 mg Oral Daily    apixaban  5 mg Oral BID    aspirin  81 mg Oral Daily    atorvastatin  80 mg Oral Daily    clopidogreL  75 mg Oral Daily    diltiaZEM  120 mg Oral Daily    insulin aspart U-100  0-5 Units Subcutaneous TIDWM    insulin aspart U-100  4-7 Units Subcutaneous TIDWM    insulin detemir U-100  10 Units Subcutaneous BID    lacosamide  100 mg Oral Q12H    losartan  25 mg Oral Daily    metoclopramide HCl  10 mg Oral TID AC    metoprolol succinate  50 mg Oral Daily    pantoprazole  40 mg Oral Daily    potassium, sodium phosphates  2 packet Oral QID (WM & HS)    senna-docusate 8.6-50 mg  1 tablet Oral Daily       PRN Medications: aluminum & magnesium hydroxide-simethicone,  dextrose 50%, dextrose 50%, hydrALAZINE, labetalol, nitroGLYCERIN, promethazine (PHENERGAN) IVPB, sodium chloride 0.9%    Family History    None       Tobacco Use    Smoking status: Former Smoker     Packs/day: 1.50     Years: 25.00     Pack years: 37.50     Quit date: 1983     Years since quittin.1    Smokeless tobacco: Never Used   Substance and Sexual Activity    Alcohol use: No    Drug use: No    Sexual activity: Yes     Partners: Female     Review of Systems   Constitutional: Positive for activity change. Negative for fatigue and fever.   HENT: Negative for trouble swallowing and voice change.    Respiratory: Negative for cough and shortness of breath.    Cardiovascular: Negative for chest pain and leg swelling.   Gastrointestinal: Negative for abdominal distention and abdominal pain.   Genitourinary: Negative for difficulty urinating and flank pain.   Musculoskeletal: Positive for gait problem. Negative for back pain.   Skin: Negative for color change and rash.   Neurological: Positive for weakness. Negative for speech difficulty and numbness.   Psychiatric/Behavioral: Negative for agitation and confusion.     Objective:     Vital Signs (Most Recent):  Temp: 98.3 °F (36.8 °C) (22)  Pulse: 83 (22)  Resp: 18 (22)  BP: 136/65 (22)  SpO2: (!) 92 % (22)    Vital Signs (24h Range):  Temp:  [97.3 °F (36.3 °C)-98.3 °F (36.8 °C)] 98.3 °F (36.8 °C)  Pulse:  [77-86] 83  Resp:  [18] 18  SpO2:  [92 %-95 %] 92 %  BP: (120-146)/(61-94) 136/65     Body mass index is 26.95 kg/m².    Physical Exam  Vitals and nursing note reviewed.   Constitutional:       Appearance: He is well-developed and well-nourished.   HENT:      Head: Normocephalic and atraumatic.   Eyes:      General:         Right eye: No discharge.         Left eye: No discharge.      Pupils: Pupils are equal, round, and reactive to light.   Pulmonary:      Effort: Pulmonary effort is normal. No  respiratory distress.   Abdominal:      General: There is no distension.      Palpations: Abdomen is soft.      Tenderness: There is no abdominal tenderness.   Musculoskeletal:         General: No deformity or edema.      Cervical back: Neck supple.      Comments: Mild generalized weakness   Skin:     General: Skin is warm and dry.   Neurological:      Mental Status: Mental status is at baseline.      Sensory: No sensory deficit.      Motor: No abnormal muscle tone.   Psychiatric:         Mood and Affect: Mood and affect and mood normal.         Behavior: Behavior normal.       NEUROLOGICAL EXAMINATION:     CRANIAL NERVES     CN III, IV, VI   Pupils are equal, round, and reactive to light.      Diagnostic Results: Labs: Reviewed  ECG: Reviewed  CT: Reviewed

## 2022-02-01 NOTE — SUBJECTIVE & OBJECTIVE
"Interval HPI:   Glucose trend  at goal today  Tolerating diet   Overnight events: none  Eatin%  Nausea: No  Hypoglycemia and intervention: No  Fever: No  TPN and/or TF: No  If yes, type of TF/TPN and rate: na    BP (!) 114/57   Pulse 80   Temp 98.1 °F (36.7 °C)   Resp 18   Ht 6' 2" (1.88 m)   Wt 95.2 kg (209 lb 14.1 oz)   SpO2 (!) 91%   BMI 26.95 kg/m²     Labs Reviewed and Include    Recent Labs   Lab 22  0620   *   CALCIUM 8.3*   ALBUMIN 1.8*      K 3.8   CO2 27      BUN 15   CREATININE 1.0     Lab Results   Component Value Date    WBC 11.32 2022    HGB 13.1 (L) 2022    HCT 41.1 2022    MCV 89 2022     2022     No results for input(s): TSH, FREET4 in the last 168 hours.  Lab Results   Component Value Date    HGBA1C 11.5 (H) 2022       Nutritional status:   Body mass index is 26.95 kg/m².  Lab Results   Component Value Date    ALBUMIN 1.8 (L) 2022    ALBUMIN 2.0 (L) 2022    ALBUMIN 2.0 (L) 2022     No results found for: PREALBUMIN    Estimated Creatinine Clearance: 70.8 mL/min (based on SCr of 1 mg/dL).    Accu-Checks  Recent Labs     22  1928 22  2235 22  2340 22  0034 22  0420 22  0754 22  1203 22  1547 22  2005 22  0615   POCTGLUCOSE 405* 123* 76 84 75 143* 255* 183* 162* 183*       Current Medications and/or Treatments Impacting Glycemic Control  Immunotherapy:    Immunosuppressants     None        Steroids:   Hormones (From admission, onward)            None        Pressors:    Autonomic Drugs (From admission, onward)            None        Hyperglycemia/Diabetes Medications:   Antihyperglycemics (From admission, onward)            Start     Stop Route Frequency Ordered    22 1130  insulin aspart U-100 pen 7 Units         -- SubQ 3 times daily with meals 22 0937    22 1645  insulin aspart U-100 pen 0-5 Units         -- SubQ 3 " times daily with meals 01/31/22 1301    01/30/22 1330  insulin detemir U-100 pen 10 Units         -- SubQ 2 times daily 01/30/22 1320

## 2022-02-01 NOTE — CONSULTS
[facility] ([facility])  Physical Medicine & Rehab  Consult Note    Patient Name: Kamar Muñoz  MRN: [id number]  Admission Date: 1/25/2022  Hospital Length of Stay: 7 days  Attending Physician: Adrienne Albright MD     Inpatient consult to Physical Medicine & Rehabilitation  Consult performed by: Shahida Wellington NP  Consult requested by:  Adrienne Albright MD    Collaborating Physician: Selene Grijalva MD  Reason for Consult:  Assess rehabilitation needs     Consults  Subjective:     Principal Problem: Acute metabolic encephalopathy    HPI: Jordy Muñoz is a 76-year-old male with PMHx of CAD, DM, HLD, HTN, recent hospitalization of STEMI with x 2 stent placements on 1/9/2022 and R MCA recent stroke 1/12 with no residual weakness. Patient presented to [facility] on 1/25 for L sided weakness and slurred speech. MRI 1/25 No new hemorrhage or infarct identified elsewhere. Per  encephalopathy related multifactorial from DKA/HHS and seizures (improving), BRENDAN (resolved after IVFs), A fib (Metoprolol, Diltiazem, and Amiodarone daily).     Functional History: Patient lives with spouse in a single story home with 1 step to enter.  Prior to admission, I. DME: [facility].       Hospital Course: 1/30/22: Participated w/ OT. Bed mob CGA. Sit to stand Liz. Ambulated 20 ft Liz. UBD modA. LBD maxA.   1/31/22: SLP mechanical soft and thin.       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 8/2/2012    STEMI involving right coronary artery 1/9/2022    Type II or unspecified type diabetes mellitus with neurological manifestations, uncontrolled(250.62) 3/8/2013    Urinary tract infection      Past Surgical History:   Procedure Laterality Date    BACK SURGERY      CATARACT EXTRACTION W/  INTRAOCULAR LENS IMPLANT Right     Per Dr Romero note 11/2018    COLONOSCOPY  N/A 2019    Procedure: COLONOSCOPY Suprep;  Surgeon: Anh Johnson MD;  Location: [facility] ENDO;  Service: Endoscopy;  Laterality: N/A;    EYE SURGERY      HERNIA REPAIR      LEFT HEART CATHETERIZATION Left 2022    Procedure: CATHETERIZATION, HEART, LEFT;  Surgeon: Will Hurst III, MD;  Location: [facility] CATH LAB/EP;  Service: Cardiology;  Laterality: Left;    renal stones      SHOULDER OPEN ROTATOR CUFF REPAIR       Review of patient's allergies indicates:   Allergen Reactions    Iodine      Other reaction(s): swelling  Other reaction(s): Itching  Other reaction(s): Rash       Scheduled Medications:    amiodarone  200 mg Oral Daily    apixaban  5 mg Oral BID    aspirin  81 mg Oral Daily    atorvastatin  80 mg Oral Daily    clopidogreL  75 mg Oral Daily    diltiaZEM  120 mg Oral Daily    insulin aspart U-100  0-5 Units Subcutaneous TIDWM    insulin aspart U-100  4-7 Units Subcutaneous TIDWM    insulin detemir U-100  10 Units Subcutaneous BID    lacosamide  100 mg Oral Q12H    losartan  25 mg Oral Daily    metoclopramide HCl  10 mg Oral TID AC    metoprolol succinate  50 mg Oral Daily    pantoprazole  40 mg Oral Daily    potassium, sodium phosphates  2 packet Oral QID (WM & HS)    senna-docusate 8.6-50 mg  1 tablet Oral Daily       PRN Medications: aluminum & magnesium hydroxide-simethicone, dextrose 50%, dextrose 50%, hydrALAZINE, labetalol, nitroGLYCERIN, promethazine (PHENERGAN) IVPB, sodium chloride 0.9%    Family History    None       Tobacco Use    Smoking status: Former Smoker     Packs/day: 1.50     Years: 25.00     Pack years: 37.50     Quit date: 1983     Years since quittin.1    Smokeless tobacco: Never Used   Substance and Sexual Activity    Alcohol use: No    Drug use: No    Sexual activity: Yes     Partners: Female     Review of Systems   Constitutional: Positive for activity change. Negative for fatigue and fever.   HENT: Negative for trouble  swallowing and voice change.    Respiratory: Negative for cough and shortness of breath.    Cardiovascular: Negative for chest pain and leg swelling.   Gastrointestinal: Negative for abdominal distention and abdominal pain.   Genitourinary: Negative for difficulty urinating and flank pain.   Musculoskeletal: Positive for gait problem. Negative for back pain.   Skin: Negative for color change and rash.   Neurological: Positive for weakness. Negative for speech difficulty and numbness.   Psychiatric/Behavioral: Negative for agitation and confusion.     Objective:     Vital Signs (Most Recent):  Temp: 98.3 °F (36.8 °C) (02/01/22 0330)  Pulse: 83 (02/01/22 0330)  Resp: 18 (02/01/22 0330)  BP: 136/65 (02/01/22 0330)  SpO2: (!) 92 % (02/01/22 0330)    Vital Signs (24h Range):  Temp:  [97.3 °F (36.3 °C)-98.3 °F (36.8 °C)] 98.3 °F (36.8 °C)  Pulse:  [77-86] 83  Resp:  [18] 18  SpO2:  [92 %-95 %] 92 %  BP: (120-146)/(61-94) 136/65     Body mass index is 26.95 kg/m².    Physical Exam  Vitals and nursing note reviewed.   Constitutional:       Appearance: He is well-developed and well-nourished.   HENT:      Head: Normocephalic and atraumatic.   Eyes:      General:         Right eye: No discharge.         Left eye: No discharge.      Pupils: Pupils are equal, round, and reactive to light.   Pulmonary:      Effort: Pulmonary effort is normal. No respiratory distress.   Abdominal:      General: There is no distension.      Palpations: Abdomen is soft.      Tenderness: There is no abdominal tenderness.   Musculoskeletal:         General: No deformity or edema.      Cervical back: Neck supple.      Comments: Mild generalized weakness   Skin:     General: Skin is warm and dry.   Neurological:      Mental Status: Mental status is at baseline.      Sensory: No sensory deficit.      Motor: No abnormal muscle tone.   Psychiatric:         Mood and Affect: Mood and affect and mood normal.         Behavior: Behavior normal.        Diagnostic Results:   Labs: Reviewed  ECG: Reviewed  CT: Reviewed    Assessment/Plan:     Focal seizures  - still on IV seizure medications    BRENDAN (acute kidney injury)  - resolved after IVFs    Embolic stroke involving right middle cerebral artery  - Related to prolonged/acute hospital course.     Recommendations  -  Encourage mobility, OOB in chair at least 3 hours per day, and early ambulation as appropriate  -  PT/OT evaluate and treat  -  Pain management  -  Monitor for and prevent skin breakdown and pressure ulcers  · Early mobility, repositioning/weight shifting every 20-30 minutes when sitting, turn patient every 2 hours, proper mattress/overlay and chair cushioning, pressure relief/heel protector boots  -  DVT prophylaxis    -  Reviewed discharge options (IP rehab, SNF, HH therapy, and OP therapy)    PM&R Recommendation:     At this time, the PM&R team has reviewed this patient's ongoing medical case including inpatient diagnosis, medical history, clinical examination, labs, vitals, current social and functional history to provide the post-acute recommendation as follows:     RECOMMENDATIONS: inpatient rehabilitation due to good motivation/participation with therapies and good potential for recovery.    MEDICAL STABILITY:     At this time, barriers for post-acute rehab admission include transition to PO seizure medications.     We will continue to follow.     Thank you for your consult.     Shahida Wellington NP  Department of Physical Medicine & Rehab  [facility]surgery ([facility])

## 2022-02-01 NOTE — ASSESSMENT & PLAN NOTE
Brief Hx:     Presented with Diabetic Ketoacidosis in the setting of hemorraghic conversion of recent RMCA infarct complicated by focal seizures and new NSTEMI.     DKA now resolved  Consulted for:   DKA  DM type:   Ketosis prone T2D  A1C: 11.5 01/26/2022  Hypoglycemia:  none  Steroids:   none  Diet/Tfs:    Variable >50%  Glucose Goals:  140-180 mg/dL    Glucose Trend x 24hr:         Home Regimen:    Lantus 17 units at bedtime, aspart 17 units with meals, (mismatched)    mg BID      PLAN:   -  Detemir 10 units BID   -  Aspart  7  units TIDWM   -  Low dose correction insulin   -  Accucheck [facility]

## 2022-02-01 NOTE — SUBJECTIVE & OBJECTIVE
Scheduled Meds:   amiodarone  200 mg Oral Daily    apixaban  5 mg Oral BID    aspirin  81 mg Oral Daily    atorvastatin  80 mg Oral Daily    clopidogreL  75 mg Oral Daily    diltiaZEM  120 mg Oral Daily    insulin aspart U-100  0-5 Units Subcutaneous TIDWM    insulin aspart U-100  7 Units Subcutaneous TIDWM    insulin detemir U-100  10 Units Subcutaneous BID    lacosamide  100 mg Oral Q12H    losartan  25 mg Oral Daily    metoclopramide HCl  10 mg Oral TID AC    metoprolol succinate  50 mg Oral Daily    pantoprazole  40 mg Oral Daily    potassium, sodium phosphates  2 packet Oral QID (WM & HS)    senna-docusate 8.6-50 mg  1 tablet Oral Daily     Continuous Infusions:  PRN Meds:aluminum & magnesium hydroxide-simethicone, dextrose 50%, dextrose 50%, hydrALAZINE, labetalol, nitroGLYCERIN, promethazine (PHENERGAN) IVPB, sodium chloride 0.9%    Review of patient's allergies indicates:   Allergen Reactions    Iodine      Other reaction(s): swelling  Other reaction(s): Itching  Other reaction(s): Rash        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 8/2/2012    STEMI involving right coronary artery 1/9/2022    Type II or unspecified type diabetes mellitus with neurological manifestations, uncontrolled(250.62) 3/8/2013    Urinary tract infection      Past Surgical History:   Procedure Laterality Date    BACK SURGERY      CATARACT EXTRACTION W/  INTRAOCULAR LENS IMPLANT Right     Per Dr Romero note 11/2018    COLONOSCOPY N/A 1/28/2019    Procedure: COLONOSCOPY Suprep;  Surgeon: Anh Johnson MD;  Location: [facility];  Service: Endoscopy;  Laterality: N/A;    EYE SURGERY      HERNIA REPAIR      LEFT HEART CATHETERIZATION Left 1/9/2022    Procedure: CATHETERIZATION, HEART, LEFT;  Surgeon: Will Hurst III, MD;  Location:  [facility] CATH LAB/EP;  Service: Cardiology;  Laterality: Left;    renal stones      SHOULDER OPEN ROTATOR CUFF REPAIR         Family History    None       Tobacco Use    Smoking status: Former Smoker     Packs/day: 1.50     Years: 25.00     Pack years: 37.50     Quit date: 1983     Years since quittin.1    Smokeless tobacco: Never Used   Substance and Sexual Activity    Alcohol use: No    Drug use: No    Sexual activity: Yes     Partners: Female     Review of Systems   Skin: Positive for wound.       Objective:     Vital Signs (Most Recent):  Temp: 97.3 °F (36.3 °C) (22 1251)  Pulse: 98 (22 1251)  Resp: 18 (22 1251)  BP: 121/65 (22 1251)  SpO2: (!) 91 % (22 0742) Vital Signs (24h Range):  Temp:  [97.3 °F (36.3 °C)-98.3 °F (36.8 °C)] 97.3 °F (36.3 °C)  Pulse:  [80-98] 98  Resp:  [18] 18  SpO2:  [91 %-93 %] 91 %  BP: (114-145)/(57-94) 121/65     Weight: 95.2 kg (209 lb 14.1 oz)  Body mass index is 26.95 kg/m².  Physical Exam  Constitutional:       Appearance: Normal appearance.   Skin:     General: Skin is warm and dry.      Findings: Lesion present.   Neurological:      Mental Status: He is alert.         Laboratory:  All pertinent labs reviewed within the last 24 hours.    Diagnostic Results:  None

## 2022-02-02 VITALS
SYSTOLIC BLOOD PRESSURE: 134 MMHG | DIASTOLIC BLOOD PRESSURE: 67 MMHG | WEIGHT: 209.88 LBS | OXYGEN SATURATION: 92 % | BODY MASS INDEX: 26.94 KG/M2 | RESPIRATION RATE: 18 BRPM | HEART RATE: 72 BPM | TEMPERATURE: 97 F | HEIGHT: 74 IN

## 2022-02-02 LAB
ALBUMIN SERPL BCP-MCNC: 1.9 G/DL (ref 3.5–5.2)
ANION GAP SERPL CALC-SCNC: 5 MMOL/L (ref 8–16)
BUN SERPL-MCNC: 15 MG/DL (ref 8–23)
CALCIUM SERPL-MCNC: 8.5 MG/DL (ref 8.7–10.5)
CHLORIDE SERPL-SCNC: 102 MMOL/L (ref 95–110)
CO2 SERPL-SCNC: 31 MMOL/L (ref 23–29)
CREAT SERPL-MCNC: 1.1 MG/DL (ref 0.5–1.4)
EST. GFR  (AFRICAN AMERICAN): >60 ML/MIN/1.73 M^2
EST. GFR  (NON AFRICAN AMERICAN): >60 ML/MIN/1.73 M^2
GLUCOSE SERPL-MCNC: 169 MG/DL (ref 70–110)
PHOSPHATE SERPL-MCNC: 3.3 MG/DL (ref 2.7–4.5)
POCT GLUCOSE: 117 MG/DL (ref 70–110)
POCT GLUCOSE: 231 MG/DL (ref 70–110)
POCT GLUCOSE: 426 MG/DL (ref 70–110)
POTASSIUM SERPL-SCNC: 3.7 MMOL/L (ref 3.5–5.1)
SODIUM SERPL-SCNC: 138 MMOL/L (ref 136–145)

## 2022-02-02 PROCEDURE — 80069 RENAL FUNCTION PANEL: CPT | Performed by: INTERNAL MEDICINE

## 2022-02-02 PROCEDURE — 1111F PR DISCHARGE MEDS RECONCILED W/ CURRENT OUTPATIENT MED LIST: ICD-10-PCS | Mod: CPTII,,, | Performed by: INTERNAL MEDICINE

## 2022-02-02 PROCEDURE — 97535 SELF CARE MNGMENT TRAINING: CPT

## 2022-02-02 PROCEDURE — 25000003 PHARM REV CODE 250: Performed by: INTERNAL MEDICINE

## 2022-02-02 PROCEDURE — 99232 PR SUBSEQUENT HOSPITAL CARE,LEVL II: ICD-10-PCS | Mod: GC,,, | Performed by: GENERAL ACUTE CARE HOSPITAL

## 2022-02-02 PROCEDURE — 99239 HOSP IP/OBS DSCHRG MGMT >30: CPT | Mod: ,,, | Performed by: INTERNAL MEDICINE

## 2022-02-02 PROCEDURE — 25000003 PHARM REV CODE 250: Performed by: PSYCHIATRY & NEUROLOGY

## 2022-02-02 PROCEDURE — 1111F DSCHRG MED/CURRENT MED MERGE: CPT | Mod: CPTII,,, | Performed by: INTERNAL MEDICINE

## 2022-02-02 PROCEDURE — 25000003 PHARM REV CODE 250

## 2022-02-02 PROCEDURE — 97530 THERAPEUTIC ACTIVITIES: CPT

## 2022-02-02 PROCEDURE — 99232 SBSQ HOSP IP/OBS MODERATE 35: CPT | Mod: GC,,, | Performed by: GENERAL ACUTE CARE HOSPITAL

## 2022-02-02 PROCEDURE — 99239 PR HOSPITAL DISCHARGE DAY,>30 MIN: ICD-10-PCS | Mod: ,,, | Performed by: INTERNAL MEDICINE

## 2022-02-02 PROCEDURE — 36415 COLL VENOUS BLD VENIPUNCTURE: CPT | Performed by: INTERNAL MEDICINE

## 2022-02-02 RX ORDER — INSULIN ASPART 100 [IU]/ML
9 INJECTION, SOLUTION INTRAVENOUS; SUBCUTANEOUS
Status: DISCONTINUED | OUTPATIENT
Start: 2022-02-02 | End: 2022-02-02 | Stop reason: HOSPADM

## 2022-02-02 RX ORDER — AMOXICILLIN 250 MG
1 CAPSULE ORAL DAILY
Start: 2022-02-03 | End: 2022-09-06

## 2022-02-02 RX ORDER — PROMETHAZINE HYDROCHLORIDE 25 MG/1
25 TABLET ORAL EVERY 6 HOURS PRN
Start: 2022-02-02 | End: 2022-02-16

## 2022-02-02 RX ORDER — INSULIN ASPART 100 [IU]/ML
1-10 INJECTION, SOLUTION INTRAVENOUS; SUBCUTANEOUS
Status: DISCONTINUED | OUTPATIENT
Start: 2022-02-02 | End: 2022-02-02 | Stop reason: HOSPADM

## 2022-02-02 RX ORDER — AMIODARONE HYDROCHLORIDE 200 MG/1
200 TABLET ORAL DAILY
Qty: 90 TABLET | Refills: 3 | Status: SHIPPED | OUTPATIENT
Start: 2022-02-02

## 2022-02-02 RX ORDER — INSULIN ASPART 100 [IU]/ML
1-10 INJECTION, SOLUTION INTRAVENOUS; SUBCUTANEOUS
Refills: 0 | Status: ON HOLD
Start: 2022-02-02 | End: 2022-03-10 | Stop reason: HOSPADM

## 2022-02-02 RX ORDER — INSULIN ASPART 100 [IU]/ML
9 INJECTION, SOLUTION INTRAVENOUS; SUBCUTANEOUS 3 TIMES DAILY
Refills: 0 | Status: ON HOLD
Start: 2022-02-02 | End: 2022-03-10 | Stop reason: HOSPADM

## 2022-02-02 RX ORDER — INSULIN ASPART 100 [IU]/ML
8 INJECTION, SOLUTION INTRAVENOUS; SUBCUTANEOUS
Status: DISCONTINUED | OUTPATIENT
Start: 2022-02-02 | End: 2022-02-02

## 2022-02-02 RX ORDER — INSULIN GLARGINE 100 [IU]/ML
20 INJECTION, SOLUTION SUBCUTANEOUS NIGHTLY
Qty: 18 ML | Refills: 3 | Status: ON HOLD | OUTPATIENT
Start: 2022-02-02 | End: 2022-03-10 | Stop reason: SDUPTHER

## 2022-02-02 RX ORDER — ERGOCALCIFEROL 1.25 MG/1
50000 CAPSULE ORAL
Qty: 12 CAPSULE | Refills: 3 | Status: SHIPPED | OUTPATIENT
Start: 2022-02-02 | End: 2023-01-30 | Stop reason: CLARIF

## 2022-02-02 RX ORDER — LACOSAMIDE 100 MG/1
100 TABLET ORAL EVERY 12 HOURS
Qty: 60 TABLET | Refills: 11 | Status: ON HOLD | OUTPATIENT
Start: 2022-02-02 | End: 2023-03-26 | Stop reason: HOSPADM

## 2022-02-02 RX ORDER — METOPROLOL SUCCINATE 50 MG/1
50 TABLET, EXTENDED RELEASE ORAL DAILY
Qty: 30 TABLET | Refills: 11 | Status: ON HOLD | OUTPATIENT
Start: 2022-02-03 | End: 2022-03-10 | Stop reason: HOSPADM

## 2022-02-02 RX ADMIN — ASPIRIN 81 MG CHEWABLE TABLET 81 MG: 81 TABLET CHEWABLE at 08:02

## 2022-02-02 RX ADMIN — LACOSAMIDE 100 MG: 100 TABLET, FILM COATED ORAL at 08:02

## 2022-02-02 RX ADMIN — CLOPIDOGREL 75 MG: 75 TABLET, FILM COATED ORAL at 08:02

## 2022-02-02 RX ADMIN — POTASSIUM & SODIUM PHOSPHATES POWDER PACK 280-160-250 MG 2 PACKET: 280-160-250 PACK at 12:02

## 2022-02-02 RX ADMIN — INSULIN DETEMIR 10 UNITS: 100 INJECTION, SOLUTION SUBCUTANEOUS at 08:02

## 2022-02-02 RX ADMIN — ATORVASTATIN CALCIUM 80 MG: 40 TABLET, FILM COATED ORAL at 12:02

## 2022-02-02 RX ADMIN — LOSARTAN POTASSIUM 25 MG: 25 TABLET, FILM COATED ORAL at 08:02

## 2022-02-02 RX ADMIN — PANTOPRAZOLE SODIUM 40 MG: 40 GRANULE, DELAYED RELEASE ORAL at 08:02

## 2022-02-02 RX ADMIN — POTASSIUM & SODIUM PHOSPHATES POWDER PACK 280-160-250 MG 2 PACKET: 280-160-250 PACK at 04:02

## 2022-02-02 RX ADMIN — APIXABAN 5 MG: 5 TABLET, FILM COATED ORAL at 08:02

## 2022-02-02 RX ADMIN — METOCLOPRAMIDE 10 MG: 5 TABLET ORAL at 05:02

## 2022-02-02 RX ADMIN — DILTIAZEM HYDROCHLORIDE 120 MG: 120 CAPSULE, COATED, EXTENDED RELEASE ORAL at 08:02

## 2022-02-02 RX ADMIN — METOPROLOL SUCCINATE 50 MG: 50 TABLET, EXTENDED RELEASE ORAL at 08:02

## 2022-02-02 RX ADMIN — METOCLOPRAMIDE 10 MG: 5 TABLET ORAL at 12:02

## 2022-02-02 RX ADMIN — INSULIN ASPART 2 UNITS: 100 INJECTION, SOLUTION INTRAVENOUS; SUBCUTANEOUS at 05:02

## 2022-02-02 RX ADMIN — AMIODARONE HYDROCHLORIDE 200 MG: 200 TABLET ORAL at 08:02

## 2022-02-02 RX ADMIN — METOCLOPRAMIDE 10 MG: 5 TABLET ORAL at 04:02

## 2022-02-02 RX ADMIN — INSULIN ASPART 7 UNITS: 100 INJECTION, SOLUTION INTRAVENOUS; SUBCUTANEOUS at 05:02

## 2022-02-02 RX ADMIN — DOCUSATE SODIUM 50 MG AND SENNOSIDES 8.6 MG 1 TABLET: 8.6; 5 TABLET, FILM COATED ORAL at 08:02

## 2022-02-02 NOTE — NURSING
Report received from Davy. Patient in bed resting quietly. No apparent distress noted. Bed low, side rails up x2, call light within reach. No needs noted. Care assumed.

## 2022-02-02 NOTE — PLAN OF CARE
[facility] (Hospital)  Discharge Final Note    Primary Care Provider: Basim Guerrero MD  Expected Discharge Date: 2/2/2022  Patient medically ready for discharge to [facility] today.  Nurse can call report to [phone].  Transportation via w/c van scheduled for 5 pm per [facility].   Is family/patient aware of discharge yes, SW notified pt's son Ed by phone.    Final Discharge Note (most recent)     Final Note - 02/02/22 1610        Final Note    Assessment Type Final Discharge Note     Anticipated Discharge Disposition Rehab Facility     What phone number can be called within the next 1-3 days to see how you are doing after discharge? [phone]     Hospital Resources/Appts/Education Provided Provided patient/caregiver with written discharge plan information;Provided education on problems/symptoms using teachback        Post-Acute Status    Post-Acute Authorization Placement     Post-Acute Placement Status Set-up Complete/Auth obtained     Discharge Delays None known at this time               Important Message from Medicare  Important Message from Medicare regarding Discharge Appeal Rights: Given to patient/caregiver,Explained to patient/caregiver,Signed/date by patient/caregiver,Other (comments) (daughter signed)   Date IMM was signed: 02/01/22  Time IMM was signed: 1029  Referral Info (most recent)     Referral Info    No documentation.                 Future Appointments   Date Time Provider Department Center   2/3/2022  8:00 AM Basim Guerrero MD [facility] [place]   2/10/2022  1:00 PM Elisha Carter PA-C [facility]   3/8/2022 11:00 AM Gaurav Calvillo DO [facility]   4/6/2022  1:00 PM Basim Guerrero MD [facility] [place]     MERCED Farfan  Case Management  Ext: [phone]  02/02/2022

## 2022-02-02 NOTE — PLAN OF CARE
Problem: Adult Inpatient Plan of Care  Goal: Plan of Care Review  Outcome: Ongoing, Progressing  Goal: Patient-Specific Goal (Individualized)  Outcome: Ongoing, Progressing  Goal: Absence of Hospital-Acquired Illness or Injury  Outcome: Ongoing, Progressing  Goal: Optimal Comfort and Wellbeing  Outcome: Ongoing, Progressing  Goal: Readiness for Transition of Care  Outcome: Ongoing, Progressing   POC reviewed and updated with the patient. Questions regarding POC were encouraged and addressed with the patient.  VSS, see flow-sheets. Patient is AO X * 4 at this time. Fall/safety precautions maintained, no signs of injury noted during shift. Patient was repositioned for comfort, bed locked in low position with side rails X *3, and with call light within reach. Patient instructed to call staff for mobility, verbalized understanding. No acute signs of distress noted at this time.

## 2022-02-02 NOTE — PLAN OF CARE
Problem: Adult Inpatient Plan of Care  Goal: Plan of Care Review  2/2/2022 1647 by Blaire Hayward RN  Outcome: Met  2/2/2022 0731 by Blaire Hayward RN  Outcome: Ongoing, Progressing  Goal: Patient-Specific Goal (Individualized)  2/2/2022 1647 by Blaire Hayward RN  Outcome: Met  2/2/2022 0731 by Blaire Hayward RN  Outcome: Ongoing, Progressing  Goal: Absence of Hospital-Acquired Illness or Injury  2/2/2022 1647 by Blaire Hayward RN  Outcome: Met  2/2/2022 0731 by Blaire Hayward RN  Outcome: Ongoing, Progressing  Goal: Optimal Comfort and Wellbeing  2/2/2022 1647 by Blaire Hayward RN  Outcome: Met  2/2/2022 0731 by Blaire Hayward RN  Outcome: Ongoing, Progressing  Goal: Readiness for Transition of Care  2/2/2022 1647 by Blaire Hayward RN  Outcome: Met  2/2/2022 0731 by Blaire Hayward RN  Outcome: Ongoing, Progressing     Problem: Infection  Goal: Absence of Infection Signs and Symptoms  2/2/2022 1647 by Blaire Hayward RN  Outcome: Met  2/2/2022 0731 by Blaire Hayward RN  Outcome: Ongoing, Progressing     Problem: Diabetes Comorbidity  Goal: Blood Glucose Level Within Targeted Range  2/2/2022 1647 by Blaire Hayward RN  Outcome: Met  2/2/2022 0731 by Blaire Hayward RN  Outcome: Ongoing, Progressing     Problem: Fluid and Electrolyte Imbalance (Acute Kidney Injury/Impairment)  Goal: Fluid and Electrolyte Balance  2/2/2022 1647 by Blaire Hayward RN  Outcome: Met  2/2/2022 0731 by Blaire Hayward RN  Outcome: Ongoing, Progressing     Problem: Oral Intake Inadequate (Acute Kidney Injury/Impairment)  Goal: Optimal Nutrition Intake  2/2/2022 1647 by Blaier Hayward RN  Outcome: Met  2/2/2022 0731 by Blaire Hayward RN  Outcome: Ongoing, Progressing     Problem: Renal Function Impairment (Acute Kidney Injury/Impairment)  Goal: Effective Renal Function  2/2/2022 1647 by Blaire Hayward RN  Outcome: Met  2/2/2022 0731 by Blaire Hayward RN  Outcome: Ongoing, Progressing     Problem: Impaired Wound Healing  Goal:  Optimal Wound Healing  2/2/2022 1647 by Blaire Hayward RN  Outcome: Met  2/2/2022 0731 by Blaire Hayward RN  Outcome: Ongoing, Progressing     Problem: Fall Injury Risk  Goal: Absence of Fall and Fall-Related Injury  2/2/2022 1647 by Blaire Hayward RN  Outcome: Met  2/2/2022 0731 by Blaire Hayward RN  Outcome: Ongoing, Progressing     Problem: Skin Injury Risk Increased  Goal: Skin Health and Integrity  2/2/2022 1647 by Blaire Hayward RN  Outcome: Met  2/2/2022 0731 by Blaire Hayward RN  Outcome: Ongoing, Progressing     Problem: Adjustment to Illness (Delirium)  Goal: Optimal Coping  2/2/2022 1647 by Blaire Hayward RN  Outcome: Met  2/2/2022 0731 by Blaire Hayward RN  Outcome: Ongoing, Progressing     Problem: Altered Behavior (Delirium)  Goal: Improved Behavioral Control  2/2/2022 1647 by Blaire Hayward RN  Outcome: Met  2/2/2022 0731 by Blaire Hayward RN  Outcome: Ongoing, Progressing     Problem: Attention and Thought Clarity Impairment (Delirium)  Goal: Improved Attention and Thought Clarity  2/2/2022 1647 by Blaire Hayward RN  Outcome: Met  2/2/2022 0731 by Blaire Hayward RN  Outcome: Ongoing, Progressing     Problem: Sleep Disturbance (Delirium)  Goal: Improved Sleep  2/2/2022 1647 by Blaire Hayward RN  Outcome: Met  2/2/2022 0731 by Blaire Hayward RN  Outcome: Ongoing, Progressing

## 2022-02-02 NOTE — NURSING
Report called to Cheryl. Transportation at bedside to transfer patient to facility. No needs noted. Care relinquished.

## 2022-02-02 NOTE — SUBJECTIVE & OBJECTIVE
"Interval HPI:    glucose trend 169-450 much improved today   Variable diet receiving insulin later greater than 1 hour postprandially resulting in postprandial excursions  Diet is improving requiring aggressive insulin adjustments.  Overnight events: none  Eatin%  Nausea: No  Hypoglycemia and intervention: No  Fever: No  TPN and/or TF: No  If yes, type of TF/TPN and rate: na    BP (!) 144/70 (BP Location: Right arm, Patient Position: Lying)   Pulse 72   Temp 98.2 °F (36.8 °C) (Oral)   Resp 18   Ht 6' 2" (1.88 m)   Wt 95.2 kg (209 lb 14.1 oz)   SpO2 96%   BMI 26.95 kg/m²     Labs Reviewed and Include    Recent Labs   Lab 22  0701   *   CALCIUM 8.5*   ALBUMIN 1.9*      K 3.7   CO2 31*      BUN 15   CREATININE 1.1     Lab Results   Component Value Date    WBC 11.32 2022    HGB 13.1 (L) 2022    HCT 41.1 2022    MCV 89 2022     2022     No results for input(s): TSH, FREET4 in the last 168 hours.  Lab Results   Component Value Date    HGBA1C 11.5 (H) 2022       Nutritional status:   Body mass index is 26.95 kg/m².  Lab Results   Component Value Date    ALBUMIN 1.9 (L) 2022    ALBUMIN 1.8 (L) 2022    ALBUMIN 2.0 (L) 2022     No results found for: PREALBUMIN    Estimated Creatinine Clearance: 64.3 mL/min (based on SCr of 1.1 mg/dL).    Accu-Checks  Recent Labs     22  1203 22  1547 22  2005 22  0615 22  1323 22  1635 22  1654 22  1953 22  0545   POCTGLUCOSE 255* 183* 162* 183* 361* 450* 395* 426* 394* 231*       Current Medications and/or Treatments Impacting Glycemic Control  Immunotherapy:    Immunosuppressants     None        Steroids:   Hormones (From admission, onward)            None        Pressors:    Autonomic Drugs (From admission, onward)            None        Hyperglycemia/Diabetes Medications:   Antihyperglycemics (From admission, onward)     "        Start     Stop Route Frequency Ordered    02/02/22 0945  insulin aspart U-100 pen 1-10 Units         -- SubQ Before meals & nightly PRN 02/02/22 0846    02/02/22 0900  insulin aspart U-100 pen 9 Units         -- SubQ 3 times daily with meals 02/02/22 0846    01/30/22 1330  insulin detemir U-100 pen 10 Units         -- SubQ 2 times daily 01/30/22 1320

## 2022-02-02 NOTE — PLAN OF CARE
Problem: Adult Inpatient Plan of Care  Goal: Plan of Care Review  Outcome: Ongoing, Progressing  Goal: Patient-Specific Goal (Individualized)  Outcome: Ongoing, Progressing  Goal: Absence of Hospital-Acquired Illness or Injury  Outcome: Ongoing, Progressing  Goal: Optimal Comfort and Wellbeing  Outcome: Ongoing, Progressing  Goal: Readiness for Transition of Care  Outcome: Ongoing, Progressing     Problem: Infection  Goal: Absence of Infection Signs and Symptoms  Outcome: Ongoing, Progressing     Problem: Diabetes Comorbidity  Goal: Blood Glucose Level Within Targeted Range  Outcome: Ongoing, Progressing     Problem: Fluid and Electrolyte Imbalance (Acute Kidney Injury/Impairment)  Goal: Fluid and Electrolyte Balance  Outcome: Ongoing, Progressing     Problem: Oral Intake Inadequate (Acute Kidney Injury/Impairment)  Goal: Optimal Nutrition Intake  Outcome: Ongoing, Progressing     Problem: Renal Function Impairment (Acute Kidney Injury/Impairment)  Goal: Effective Renal Function  Outcome: Ongoing, Progressing     Problem: Impaired Wound Healing  Goal: Optimal Wound Healing  Outcome: Ongoing, Progressing     Problem: Fall Injury Risk  Goal: Absence of Fall and Fall-Related Injury  Outcome: Ongoing, Progressing     Problem: Skin Injury Risk Increased  Goal: Skin Health and Integrity  Outcome: Ongoing, Progressing     Problem: Adjustment to Illness (Delirium)  Goal: Optimal Coping  Outcome: Ongoing, Progressing     Problem: Altered Behavior (Delirium)  Goal: Improved Behavioral Control  Outcome: Ongoing, Progressing     Problem: Attention and Thought Clarity Impairment (Delirium)  Goal: Improved Attention and Thought Clarity  Outcome: Ongoing, Progressing     Problem: Sleep Disturbance (Delirium)  Goal: Improved Sleep  Outcome: Ongoing, Progressing

## 2022-02-02 NOTE — PT/OT/SLP PROGRESS
"Occupational Therapy   Treatment    Name: aKmar Muñoz  MRN: [id number]  Admitting Diagnosis:  Acute metabolic encephalopathy       Recommendations:     Discharge Recommendations: rehabilitation facility  Discharge Equipment Recommendations:  other (see comments) (TBD)  Barriers to discharge:  Other (Comment) (impaired activity tolerance and fall risk)    Assessment:     Kamar Muñoz is a 76 y.o. male with a medical diagnosis of Acute metabolic encephalopathy.  Pt demonstrated good participation during session, performing bed to chair transfer.  However, he was unable to stay sitting in the chair due to sacral wound pain.  Pt also requires significant assistance with toileting as he was soaked with urine upon OT entrance.  He was oriented to person, place, and time.  He presents with the following. Performance deficits affecting function are weakness,impaired endurance,impaired self care skills,impaired functional mobilty,gait instability,impaired balance,impaired sensation,decreased lower extremity function,impaired skin,pain,decreased safety awareness.     Rehab Prognosis:  Good; patient would benefit from acute skilled OT services to address these deficits and reach maximum level of function.       Plan:     Patient to be seen 4 x/week to address the above listed problems via self-care/home management,therapeutic activities,therapeutic exercises,neuromuscular re-education  · Plan of Care Expires: 03/01/22  · Plan of Care Reviewed with: patient    Subjective   "I can't stay in the chair.  It hurts."   Pain/Comfort:  Pain Rating 1: other (see comments) (not rated)  Location - Orientation 1: generalized  Location 1: coccyx  Pain Addressed 1: Reposition,Distraction,Cessation of Activity  Pain Rating Post-Intervention 1: other (see comments) (see above)    Objective:     Communicated with: nursing prior to session.  Patient found right sidelying with bed alarm,Other (comments) (adult brief) upon OT entry to " room.    General Precautions: Standard, fall   Orthopedic Precautions:N/A   Braces: N/A  Respiratory Status: Room air     Occupational Performance:     Bed Mobility:    · Patient completed Supine to Sit to the R with minimum assistance  · Patient completed Sit to Supine with minimum assistance for LE management  · Pt rolled L and R with SBA,   · Pt scooted to EOB with SBA.     Functional Mobility/Transfers:  · Patient completed Sit <> Stand Transfer from EOB x 1 trial and from bedside chair x 1 trial with minimum assistance  with  rolling walker; he also stood from the chair a second time with Min A with HHA.    · Pt completed Bed to Chair Transfer via Step Transfer technique with Min A with RW.  · Pt completed Chair to Bed Transfer via Step Transfer technique with Mod A with HHA   · Functional Mobility: Pt took ~3 steps from EOB to the bedside chair with Min A with RW and ~3 steps from the chair back to EOB with Mod A with HHA.  Pt required cueing for hand and leg placement for safety.     Activities of Daily Living:  · Upper Body Dressing: minimum assistance to doff damp gown/pema clean one while supine and sitting EOB  · Toileting: maximal assistance to doff damp adult brief/pema clean one while pt rolled in bed and stood EOB.  Attempted to assist pt with hygiene and educated pt on the importance of hygiene, but he declined due to the wipes being too cold.    · Lower Body Dressing: Max A to pema non-skid sock while sitting EOB      [facility] 6 Click ADL: 16    Treatment & Education:  - Waffle mattress had arrived to pt's room.  Writing therapist placed and inflated the mattress and replaced pt's linens.      - Inflated seat cushion had also arrived to pt's room.  Therapist placed it on the bedside chair, but pt said it was still too painful for prolonged sitting.     Pt edu on role of OT, POC, safety when performing self care tasks, benefit of performing OOB activity, and safety when performing functional transfers  and mobility.    - Self care tasks completed-- as noted above       Patient left left sidelying with all lines intact, call button in reach and bed alarm onEducation:      GOALS:   Multidisciplinary Problems     Occupational Therapy Goals        Problem: Occupational Therapy Goal    Goal Priority Disciplines Outcome Interventions   Occupational Therapy Goal     OT, PT/OT Ongoing, Progressing    Description: Goals set on 1/30, with expiration date 2/13/2022:  Patient will increase functional independence with ADLs by performing:    Bed mobility with Supervision  Grooming while standing at sink with Supervision  UB Dressing with Supervision.  LB Dressing with Supervision.  Toileting from toilet with Supervision for hygiene and clothing management.   Functional mobility of household and community distance with Supervision and AD as needed  Pt will demonstrate understanding of education provided regarding energy conservation and task modification through teach-back method.  Pt will demonstrate [place] in HEP for LUE strengthening GM/FM exercises to improve functional performance.                      Time Tracking:     OT Date of Treatment: 02/02/22  OT Start Time: 1310  OT Stop Time: 1348  OT Total Time (min): 38 min    Billable Minutes:Self Care/Home Management 23 min  Therapeutic Activity 15 min    OT/JUAN: OT          2/2/2022

## 2022-02-02 NOTE — PROGRESS NOTES
[facility] Medicine  Progress note    Team: [facility] HOSP MED Z Adrienne Albright MD  Admit Date: 1/25/2022  ALLIE 2/2/2022  Code status: Full Code    Principal Problem:  Acute metabolic encephalopathy    Interval hx: No new events since yesterday    ROS   Respiratory: neg for cough neg for shortness of breath  Cardiovascular: neg for chest pain neg for palpitations  Gastrointestinal: neg for nausea neg for vomiting, neg for abdominal pain neg for diarrhea neg for constipation   Behavioral/Psych: neg for depression neg for anxiety    PEx  Temp:  [97.3 °F (36.3 °C)-98.3 °F (36.8 °C)]   Pulse:  [79-98]   Resp:  [17-18]   BP: (114-153)/(57-76)   SpO2:  [91 %-94 %]     Intake/Output Summary (Last 24 hours) at 2/1/2022 2038  Last data filed at 2/1/2022 1744  Gross per 24 hour   Intake 1080 ml   Output --   Net 1080 ml     General Appearance: no acute distress   Heart: regular rate and rhythm, no heave  Respiratory: Normal respiratory effort, symmetric excursion, bilateral vesicular breath sounds   Abdomen: Soft, non-tender; bowel sounds active  Skin: intact, no rash, no ulcers  Neurologic:  No focal numbness or weakness  Mental status: Alert, oriented x 4, affect appropriate     Recent Labs   Lab 01/25/22  2137 01/27/22  0349 01/28/22  0247   WBC  --  14.87* 11.32   HGB  --  13.1* 13.1*   HCT 38 40.1 41.1   PLT  --  316 252     Recent Labs   Lab 01/27/22  0627 01/27/22  1931 01/28/22  0247 01/29/22  0712 01/30/22  2149 01/30/22  2350 01/31/22  0647 02/01/22  0620   NA   < >  --  140  --    < > 143 142 137   K   < >  --  5.1  --    < > 3.7 4.0 3.8   CL   < >  --  106  --    < > 106 107 104   CO2   < >  --  24  --    < > 26 25 27   BUN   < >  --  24*  --    < > 20 18 15   CREATININE   < >  --  1.5*  --    < > 1.3 1.2 1.0   GLU   < >  --  291*  --    < > 79 117* 177*   CALCIUM   < >  --  7.9*  --    < > 8.7 8.7 8.3*   MG  --  1.8 2.1 1.9  --   --   --   --    PHOS   < >  --  2.2*  --    < > 2.0* 2.9 3.3    < > = values in this interval  not displayed.     Recent Labs   Lab 01/25/22  2301 01/26/22  0329 01/27/22  0019 01/27/22  0019 01/27/22  0627 01/27/22  0627 01/28/22  0247 01/30/22  2149 01/30/22  2350 01/31/22  0647 02/01/22  0620   ALKPHOS  --    < > 113  --  120  --  128  --   --   --   --    ALT  --    < > 19  --  22  --  20  --   --   --   --    AST  --    < > 47*  --  49*  --  45*  --   --   --   --    ALBUMIN  --    < > 2.4*   < > 2.5*   < > 2.2*   < > 2.0* 2.0* 1.8*   PROT  --    < > 5.9*  --  6.4  --  6.4  --   --   --   --    BILITOT  --    < > 0.5  --  0.7  --  0.9  --   --   --   --    INR 1.0  --   --   --   --   --   --   --   --   --   --     < > = values in this interval not displayed.        Recent Labs   Lab 01/31/22  2005 02/01/22  0615 02/01/22  1323 02/01/22  1635 02/01/22  1654 02/01/22 2009   POCTGLUCOSE 162* 183* 361* 450* 395* 394*       Scheduled Meds:   amiodarone  200 mg Oral Daily    apixaban  5 mg Oral BID    aspirin  81 mg Oral Daily    atorvastatin  80 mg Oral Daily    clopidogreL  75 mg Oral Daily    diltiaZEM  120 mg Oral Daily    insulin aspart U-100  0-5 Units Subcutaneous TIDWM    insulin aspart U-100  7 Units Subcutaneous TIDWM    insulin detemir U-100  10 Units Subcutaneous BID    lacosamide  100 mg Oral Q12H    losartan  25 mg Oral Daily    metoclopramide HCl  10 mg Oral TID AC    metoprolol succinate  50 mg Oral Daily    pantoprazole  40 mg Oral Daily    potassium, sodium phosphates  2 packet Oral QID (WM & HS)    senna-docusate 8.6-50 mg  1 tablet Oral Daily     Continuous Infusions:    As Needed:  aluminum & magnesium hydroxide-simethicone, dextrose 50%, dextrose 50%, hydrALAZINE, labetalol, nitroGLYCERIN, promethazine (PHENERGAN) IVPB, sodium chloride 0.9%    Assessment and Plan  / Problems managed today    * Acute metabolic encephalopathy  Multifactorial from DKA/HHS and seizures. Improved and baseline. Consider ST for cognitive therapy.    Diabetic ketoacidosis with coma associated  with type 2 diabetes mellitus  Patient was debilitated and doing poorly few weeks prior to admission. Poor oral intake coupled with missing doses of medications. A1c 11. Poor history of control  Arrived with acute encephalopathy  PH 7.2 +serum ketones  Likely mixed with hyperosmalar hyperglycemic syndrome as presenting serum gluse in 800s  Treated with insulin drip ad IVF fluids with improvement in mental status.  Developed glucoses on 1/20 in 400s without DKA or HHS. Required IV insulin intensive drip to bring down.  Endocrinology following and managing insulins    Focal seizures  EEG upon admission demonstrated seizures. Resolved with lacosamide loading. Now on lacosamide 100 mg BID.     NSTEMI (non-ST elevated myocardial infarction)  History of recent STEMI 1/9/2021.  Coronary artery disease  [facility] on 1/9/22 with two vessel coronary artery disease with 100% occlusion of mid RCA and 70% stenosis of proximal LAD, RCA stent x2. LAD lesions not intervened on. Cardiology consulted. Placed on ACS protocol with plavix, ASA, and heparin drip. Troponins peaked 1.5 and has been down trending since. NSTEMI likely type II due to DKA. Will continue on clopidogrel 75 mg daily, ASA 81 mg daily, apixaban 5 mg BID.    SIRS due to non-infectious process without acute organ dysfunction  BRENDAN. Leukocytosis and hypoxia. Likely driven by seizures and DKA/HHS. Patient empirically started on broad spectrum antibiotics. Blood cultures negative, UA negative, and CXR negative. Then tailored down to zosyn for presumed infection of sacral wound. However, wound does not look infected. Completed Zosyn for five days.      Nausea & vomiting  Possibly due to uncontrolled glucoses. Seemed improved with metoclopramide. Need better control of glucoses.    Hyperkalemia  Admitted with K 6.2. Resolved with insulin drip and IVFs and subsequent resolution of BRENDAN    Dysarthria and anarthria  Improving. ST.     BRENDAN (acute kidney injury)  Patient with acute  kidney injury likely d/t IVVD/Dehydration and Pre-renal azotemia Which is currently improving. Labs reviewed- Renal function/electrolytes with Estimated Creatinine Clearance: 47.2 mL/min (A) (based on SCr of 1.5 mg/dL (H)). according to latest data. Monitor urine output and serial BMP and adjust therapy as needed. Avoid nephrotoxins and renally dose meds for GFR listed above.     Resolved with IVFs.     Paroxysmal atrial fibrillation  Patient with Paroxysmal (<7 days) atrial fibrillation which is controlled currently with Beta Blocker, Calcium Channel Blocker and Amiodarone. Patient is currently in sinus rhythm.[id number] Score: 4. Anticoagulation indicated. Anticoagulation done with apixaban. Metoprolol XL 50 mg daily, diltiazem  mg daily and amiodarone 200 mg daily.    Embolic stroke involving right middle cerebral artery  Embolic stroke involving left cerebellar artery  Debility  Newly diagnosed 1/12/2022. PT/OT. Family requesting in-patient rehabilitation.   Continue apixaban, atorvastatin , ASA, metoprolol, diltiazem.    Hypertension associated with diabetes  continue losartan 25 mg daily, metoprolol and diltiazem.     Diet:  mechanical diet  GI PPx: protonix  DVT PPx:  apixaban  Airways: 2L NC  Wounds: none    Goals of Care:  Return to prior functional status     Discharge plan: home    Time (minutes) spent in care of the patient (Including face to face contact and coordination of care while on unit)  35 min    Adrienne Albright MD

## 2022-02-02 NOTE — PLAN OF CARE
[facility]     Department of Hospital Medicine     [address] [place] [address]     [phone] after hours  [phone] fax                                        FACILITY TRANSFER ORDERS     02/02/2022    Admit to:  [facility]    Diagnoses:  Active Hospital Problems    Diagnosis  POA    *Acute metabolic encephalopathy [G93.41]  Yes     Priority: 1 - High    Diabetic ketoacidosis with coma associated with type 2 diabetes mellitus [E11.11]  Yes     Priority: 2     Focal seizures [R56.9]  Yes     Priority: 2     NSTEMI (non-ST elevated myocardial infarction) [I21.4]  Yes     Priority: 3     Alteration in skin integrity [R23.9]  Yes    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]  Unknown    Hyperosmolar hyperglycemic state (HHS) [E11.00, E11.65]  Yes    Nausea & vomiting [R11.2]  Yes    SIRS due to non-infectious process without acute organ dysfunction [R65.10]  Unknown    Facial droop [R29.810]  Yes    History of ischemic stroke in prior three months [Z86.73]  Not Applicable    Hyperkalemia [E87.5]  Yes    Coronary artery disease involving native heart without angina pectoris [I25.10]  Yes    Dysarthria and anarthria [R47.1]  Yes    Embolic stroke involving left cerebellar artery [I63.442]  Yes    Paroxysmal atrial fibrillation [I48.0]  Yes    BRENDAN (acute kidney injury) [N17.9]  Yes    Embolic stroke involving right middle cerebral artery [I63.411]  Yes    Hypertension associated with diabetes [E11.59, I15.2]  Yes      Resolved Hospital Problems    Diagnosis Date Resolved POA    Hyperglycemia [R73.9] 01/31/2022 Unknown    STEMI involving right coronary artery [I21.11] 01/29/2022 Yes       Vital Signs: Routine.    Allergies:  Review of patient's allergies indicates:   Allergen Reactions    Iodine      Other reaction(s): swelling  Other reaction(s): Itching  Other reaction(s): Rash       Diet: diabetic diet 2000 kcal with boost  "glucose    Acitivities: ad jazzy    Nursing: per floor routine    Labs: fingerstick glucose checks with meals at bedtime    Consults: PT/ST/OT    Medications:   Current Discharge Medication List      START taking these medications    Details   lacosamide (VIMPAT) 100 mg Tab Take 1 tablet (100 mg total) by mouth every 12 (twelve) hours.  Qty: 60 tablet, Refills: 11      promethazine (PHENERGAN) 25 MG tablet Take 1 tablet (25 mg total) by mouth every 6 (six) hours as needed for Nausea.      senna-docusate 8.6-50 mg (PERICOLACE) 8.6-50 mg per tablet Take 1 tablet by mouth once daily.         CONTINUE these medications which have CHANGED    Details   amiodarone (PACERONE) 200 MG Tab Take 1 tablet (200 mg total) by mouth once daily.  Qty: 90 tablet, Refills: 3      ergocalciferol (ERGOCALCIFEROL) 50,000 unit Cap Take 1 capsule (50,000 Units total) by mouth every 7 days.  Qty: 12 capsule, Refills: 3      !! insulin aspart U-100 (NOVOLOG) 100 unit/mL (3 mL) InPn pen Inject 1-10 Units into the skin before meals and at bedtime as needed (Hyperglycemia).  **MODERATE CORRECTION DOSE**   Blood Glucose   mg/dL                  Pre-meal                2200   151-200                2 units                    1 unit   201-250                4 units                    2 units    251-300                6 units                    3 units    301-350                8 units                    4 units    >350                     10 units                  5 units   Administer subcutaneously if needed at times designated by monitoring schedule.    DO NOT HOLD correction dose insulin for patients who are  NPO.   "HIGH ALERT MEDICATION" - Administer with meals or TF/TPN.         !! insulin aspart U-100 (NOVOLOG) 100 unit/mL (3 mL) InPn pen Inject 9 Units into the skin 3 (three) times daily.  Refills: 0      insulin glargine (LANTUS) 100 unit/mL injection Inject 20 Units into the skin every evening.  Qty: 18 mL, Refills: 3    Associated " "Diagnoses: Type 2 diabetes mellitus with hyperglycemia, with long-term current use of insulin      metoprolol succinate (TOPROL-XL) 50 MG 24 hr tablet Take 1 tablet (50 mg total) by mouth once daily.  Qty: 30 tablet, Refills: 11    Comments: .       !! - Potential duplicate medications found. Please discuss with provider.      CONTINUE these medications which have NOT CHANGED    Details   apixaban (ELIQUIS) 5 mg Tab Take 1 tablet (5 mg total) by mouth 2 (two) times daily.  Qty: 60 tablet, Refills: 5      aspirin (ECOTRIN) 81 MG EC tablet Take 81 mg by mouth once daily.      atorvastatin (LIPITOR) 40 MG tablet Take 1 tablet (40 mg total) by mouth once daily.  Qty: 90 tablet, Refills: 3      BD ULTRA-FINE SHORT PEN NEEDLE 31 gauge x 5/16" Ndle 3 (three) times daily.      blood sugar diagnostic Strp 1 strip by Misc.(Non-Drug; Combo Route) route 2 (two) times a day.  Qty: 200 strip, Refills: 6    Associated Diagnoses: Type 2 diabetes mellitus with diabetic peripheral angiopathy without gangrene, with long-term current use of insulin      clopidogreL (PLAVIX) 75 mg tablet Take 1 tablet (75 mg total) by mouth once daily.  Qty: 30 tablet, Refills: 11      diltiaZEM (CARDIZEM CD) 120 MG Cp24 Take 1 capsule (120 mg total) by mouth once daily.  Qty: 90 capsule, Refills: 3    Comments: **Patient requests 90 days supply**  Associated Diagnoses: Essential hypertension      lancets (ONETOUCH ULTRASOFT LANCETS) Misc 1 lancet by Misc.(Non-Drug; Combo Route) route 2 (two) times a day.  Qty: 200 each, Refills: 6    Associated Diagnoses: Type 2 diabetes mellitus with diabetic peripheral angiopathy without gangrene, with long-term current use of insulin      losartan (COZAAR) 25 MG tablet Take 1 tablet (25 mg total) by mouth once daily.  Qty: 90 tablet, Refills: 3    Comments: .  Associated Diagnoses: Primary hypertension      metFORMIN (GLUCOPHAGE) 500 MG tablet Take 1 tablet (500 mg total) by mouth 2 (two) times daily with " "meals.  Qty: 180 tablet, Refills: 3    Associated Diagnoses: Type 2 diabetes mellitus with hyperglycemia, with long-term current use of insulin; Type 2 diabetes mellitus with stage 3 chronic kidney disease, with long-term current use of insulin      MULTIVITAMIN ORAL Take 1 tablet by mouth once daily.       nitroGLYCERIN (NITROSTAT) 0.4 MG SL tablet Place 1 tablet (0.4 mg total) under the tongue every 5 (five) minutes as needed for Chest pain.  Qty: 25 tablet, Refills: 11      pantoprazole (PROTONIX) 40 MG tablet Take 1 tablet (40 mg total) by mouth once daily.  Qty: 30 tablet, Refills: 11      pen needle, diabetic (PEN NEEDLE) 30 gauge x 5/16" Ndle 1 Units by Misc.(Non-Drug; Combo Route) route 3 (three) times daily.  Qty: 100 each, Refills: 3    Associated Diagnoses: Type 2 diabetes mellitus with hyperglycemia, with long-term current use of insulin; Type 2 diabetes mellitus with stage 3 chronic kidney disease, with long-term current use of insulin      polycarbophil (FIBERCON) 625 mg tablet Take 1 tablet by mouth once daily.          STOP taking these medications       diclofenac sodium (VOLTAREN) 1 % Gel Comments:   Reason for Stopping:         gabapentin (NEURONTIN) 300 MG capsule Comments:   Reason for Stopping:         hydrocortisone 2.5 % cream Comments:   Reason for Stopping:         ketoconazole (NIZORAL) 2 % cream Comments:   Reason for Stopping:         glucagon, human recombinant, (GLUCAGON EMERGENCY KIT, HUMAN,) 1 mg SolR Comments:   Reason for Stopping:                 _________________________________  Adrienne Albright MD  02/02/2022    "

## 2022-02-02 NOTE — PROGRESS NOTES
"[facility] - Neurosurgery ([facility])  Endocrinology  Progress Note    Admit Date: 1/25/2022     Mr. Kamar Muñoz is a 76-year-old-man with a history of CAD, T2DM, HLD, HTN, recent RMCA stroke with LSW, admitted for AMS found to have hemorraghic conversion of recent RMCA infarct and DKA; hospital course c/b R focal seizures and NSTEMI. Endocrinology consulted for "after DKA."     Upon arrival . pH 7.216. Bicarb 7. AG 29. BHB 4.8. UA 1+ ketones. Placed on insulin drip on admission, discontinued 1/26 after bicarb >18 and AG closed. Now on SQ aspart 6 U q4 hrs + MDSSI as well as D51/2 NS. Not tolerating diet well due to ongoing nausea, receiving prn zofran. Recent -->316-->250.   On vimpat for focal seizures. On heparin drip for NSTEMI, no chest pain upon evaluation.      Regarding Diabetes Mellitus:  - Initially diagnosed with Type 2 diabetes mellitus before 2004.   - A1c 11.5% on admission   - Current symptoms: nausea   - Denies:  chest pain, Polyuria/polydipsia, abdominal discomfort, numbness/tingling, visual changes, or subjective weight changes  Wt Readings from Last 5 Encounters:   01/26/22 95.2 kg (209 lb 14.1 oz)   01/19/22 89.7 kg (197 lb 12 oz)   01/14/22 90 kg (198 lb 6.6 oz)   01/10/22 90.3 kg (199 lb)   01/05/22 92.1 kg (203 lb 0.7 oz)     - Pertinent factors: admitted with DKA, managed by [facility].   - Denies: history of pancreatitis, recurrent gallstones, daily alcohol use, MEN syndrome, pancreatic tumors, or gastroparesis  - Known diabetic complications: cerebrovascular disease, neuropathy, CAD, UTIs  - Cardiovascular risk factors: advanced age (older than 55 for men, 65 for women), diabetes mellitus, dyslipidemia, hypertension, and male gender    - Current diabetic medications include:   1. Lantus 17 U qhs  2. Aspart 17 U TID with meals   3. Metformin 500 mg 2 times a day     - Patient now on or previously been on a GLP-1 agonist: no  - Current diet: in general, a "healthy" diet    - Current " "glucose monitoring regimen:  1 x/day  - Any episodes of hypoglycemia? no  - Family Hx:  Denies FH of diabetes or thyroid disorder       Diabetes Management Status  - Statin: atorvastatin 80 mg qd  - ACE/ARB: losartan 25 mg qd  - Seen Optometry/Ophthalmology within last 12 months: no    A complete 14 point review of systems was conducted and negative except for what is stated above.       Interval HPI:    glucose trend 169-450 much improved today   Variable diet receiving insulin later greater than 1 hour postprandially resulting in postprandial excursions  Diet is improving requiring aggressive insulin adjustments.  Overnight events: none  Eatin%  Nausea: No  Hypoglycemia and intervention: No  Fever: No  TPN and/or TF: No  If yes, type of TF/TPN and rate: na    BP (!) 144/70 (BP Location: Right arm, Patient Position: Lying)   Pulse 72   Temp 98.2 °F (36.8 °C) (Oral)   Resp 18   Ht 6' 2" (1.88 m)   Wt 95.2 kg (209 lb 14.1 oz)   SpO2 96%   BMI 26.95 kg/m²     Labs Reviewed and Include    Recent Labs   Lab 22  0701   *   CALCIUM 8.5*   ALBUMIN 1.9*      K 3.7   CO2 31*      BUN 15   CREATININE 1.1     Lab Results   Component Value Date    WBC 11.32 2022    HGB 13.1 (L) 2022    HCT 41.1 2022    MCV 89 2022     2022     No results for input(s): TSH, FREET4 in the last 168 hours.  Lab Results   Component Value Date    HGBA1C 11.5 (H) 2022       Nutritional status:   Body mass index is 26.95 kg/m².  Lab Results   Component Value Date    ALBUMIN 1.9 (L) 2022    ALBUMIN 1.8 (L) 2022    ALBUMIN 2.0 (L) 2022     No results found for: PREALBUMIN    Estimated Creatinine Clearance: 64.3 mL/min (based on SCr of 1.1 mg/dL).    Accu-Checks  Recent Labs     22  1203 22  1547 22  2005 22  0615 22  1323 22  1635 22  1654 22  1953 22  0545   POCTGLUCOSE 255* 183* 162* " 183* 361* 450* 395* 426* 394* 231*       Current Medications and/or Treatments Impacting Glycemic Control  Immunotherapy:    Immunosuppressants     None        Steroids:   Hormones (From admission, onward)            None        Pressors:    Autonomic Drugs (From admission, onward)            None        Hyperglycemia/Diabetes Medications:   Antihyperglycemics (From admission, onward)            Start     Stop Route Frequency Ordered    02/02/22 0945  insulin aspart U-100 pen 1-10 Units         -- SubQ Before meals & nightly PRN 02/02/22 0846    02/02/22 0900  insulin aspart U-100 pen 9 Units         -- SubQ 3 times daily with meals 02/02/22 0846    01/30/22 1330  insulin detemir U-100 pen 10 Units         -- SubQ 2 times daily 01/30/22 1320          ASSESSMENT and PLAN    * Acute metabolic encephalopathy  Mentation improving      Ketosis-prone diabetes mellitus  Brief Hx:     Presented with Diabetic Ketoacidosis in the setting of hemorraghic conversion of recent RMCA infarct complicated by focal seizures and new NSTEMI.     DKA now resolved  Consulted for:   DKA  DM type:   Ketosis prone T2D  A1C: 11.5 01/26/2022  Hypoglycemia:  none  Steroids:   none  Diet/Tfs:    Variable 100%  Glucose Goals:  140-180 mg/dL    Glucose Trend x 24hr:    169-450 , improving today, grossly uncontrolled yesterday suspect due to improve intake    Home Regimen:    Lantus 17 units at bedtime, aspart 17 units with meals, (mismatched)    mg BID    PLAN:   -  Detemir 10 units BID   -  Increased Aspart  9 units TIDWM   -  Increased moderate dose correction insulin   -  Accucheck achs2a      Diabetic ketoacidosis with coma associated with type 2 diabetes mellitus  Resolved, management of diabetes as above      Embolic stroke involving left cerebellar artery  Newly diagnosed 1/12/2022. PT/OT. Family requesting in-patient rehabilitation.   On apixaban, atorvastatin , ASA, metoprolol, diltiazem.  Per primary     BRENDAN (acute kidney  injury)  -  Avoid insulin stacking  - kidney function showing improvement in the last few days  - management by primary team        Stewart Rashid MD  Endocrinology  [facility] - Neurosurgery ([facility])

## 2022-02-02 NOTE — PT/OT/SLP PROGRESS
Physical Therapy      Patient Name:  Kamar Muñoz   MRN:  [id number]    Patient not seen today secondary to Pain. Rn notified of pt's request for medication. PT unable to f/u for additional attempt this date. Per RN and pt, pt possibly DCing to rehab soon. Will follow-up tomorrow as appropriate should patient remain in house.

## 2022-02-03 ENCOUNTER — TELEPHONE (OUTPATIENT)
Dept: ADMINISTRATIVE | Facility: OTHER | Age: 77
End: 2022-02-03
Payer: MEDICARE

## 2022-02-04 ENCOUNTER — EXTERNAL HOME HEALTH (OUTPATIENT)
Dept: HOME HEALTH SERVICES | Facility: HOSPITAL | Age: 77
End: 2022-02-04
Payer: MEDICARE

## 2022-02-04 ENCOUNTER — TELEPHONE (OUTPATIENT)
Dept: ADMINISTRATIVE | Facility: OTHER | Age: 77
End: 2022-02-04
Payer: MEDICARE

## 2022-02-04 NOTE — PHYSICIAN QUERY
PT Name: Kamar Muñoz  MR #: [id number]    DOCUMENTATION CLARIFICATION      CDS/: Lisa Oliveira               Contact information: [email or web address]    This form is a permanent document in the medical record.     Query Date: February 4, 2022    By submitting this query, we are merely seeking further clarification of documentation. Please utilize your independent clinical judgment when addressing the question(s) below.    The Medical Record contains the following:   Indicators   Supporting Clinical Findings Location in Medical Record   x Hypertension associated with diabetes documented Hypertension associated with diabetes      HM note 2-2    Chronic condition(s)      Vital signs     x Treatment/Medication Manage hypertension with home medications: lopressor, losartan, & diltiazem Cardiology note 1-26    Other     Provider, please clarify the relationship between the Hypertension and Diabetes Mellitus  [   ] Hypertension is a manifestation of Diabetes Mellitus (Secondary Hypertension)   [  x ]  Hypertension is not a manifestation of Diabetes Mellitus (Essential Hypertension)   [   ] Other Clarification (please specify): ____________   [  ] Clinically Undetermined       Please document in your progress notes daily for the duration of treatment, until resolved, and include in your discharge summary.

## 2022-02-07 ENCOUNTER — HOSPITAL ENCOUNTER (OUTPATIENT)
Dept: RADIOLOGY | Facility: HOSPITAL | Age: 77
Discharge: HOME | End: 2022-02-07
Attending: FAMILY MEDICINE
Payer: MEDICARE

## 2022-02-07 PROCEDURE — 74018 XR ABDOMEN AP 1 VIEW: ICD-10-PCS | Mod: 26,,, | Performed by: RADIOLOGY

## 2022-02-07 PROCEDURE — 74018 RADEX ABDOMEN 1 VIEW: CPT | Mod: 26,,, | Performed by: RADIOLOGY

## 2022-02-13 NOTE — DISCHARGE SUMMARY
Discharge Summary  Hospital Medicine    Attending Provider on Discharge: Adrienne Albright MD  [facility] Medicine Team: [facility]  Date of Admission:  1/25/2022     Date of Discharge:  2/2/2022  Code status: Full Code    Active Hospital Problems    Diagnosis  POA    *Acute metabolic encephalopathy [G93.41]  Yes     Priority: 1 - High    Diabetic ketoacidosis with coma associated with type 2 diabetes mellitus [E11.11]  Yes     Priority: 2     Focal seizures [R56.9]  Yes     Priority: 2     NSTEMI (non-ST elevated myocardial infarction) [I21.4]  Yes     Priority: 3     Alteration in skin integrity [R23.9]  Yes    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]  Unknown    Hyperosmolar hyperglycemic state (HHS) [E11.00, E11.65]  Yes    Nausea & vomiting [R11.2]  Yes    SIRS due to non-infectious process without acute organ dysfunction [R65.10]  Unknown    Facial droop [R29.810]  Yes    History of ischemic stroke in prior three months [Z86.73]  Not Applicable    Hyperkalemia [E87.5]  Yes    Coronary artery disease involving native heart without angina pectoris [I25.10]  Yes    Dysarthria and anarthria [R47.1]  Yes    Embolic stroke involving left cerebellar artery [I63.442]  Yes    Paroxysmal atrial fibrillation [I48.0]  Yes    BRENDAN (acute kidney injury) [N17.9]  Yes    Embolic stroke involving right middle cerebral artery [I63.411]  Yes    Hypertension associated with diabetes [E11.59, I15.2]  Yes      Resolved Hospital Problems    Diagnosis Date Resolved POA    Hyperglycemia [R73.9] 01/31/2022 Unknown    STEMI involving right coronary artery [I21.11] 01/29/2022 Yes     HPI  Per chart review, 76-year-old male with a history of CAD, type 2 diabetes, hyperlipidemia, hypertension, recent stroke without residual deficits who presenting with slurred speech, left facial droop and left-sided weakness via EMS.  They were called for change in mental status and vomiting. On scene he had slurred speech, left-sided  weakness and left facial droop.  FSBG greater than 400.  His last well known was approximately 8:00 a.m..  On arrival by EMS, he was able to converse however since his arrival to the ED, patient unable to provide any history, unable to converse, has left-sided facial droop and generalized weakness with left worse than right.       Hospital Course  * Acute metabolic encephalopathy  Multifactorial from DKA/HHS and seizures. Improved and baseline. ST for cognitive therapy    Diabetic ketoacidosis with coma associated with type 2 diabetes mellitus  Patient was debilitated and doing poorly few weeks prior to admission. Poor oral intake coupled with missing doses of medications. A1c 11. Poor history of control  Arrived with acute encephalopathy  PH 7.2 +serum ketones  Likely mixed with hyperosmalar hyperglycemic syndrome as presenting serum gluse in 800s  Treated with insulin drip ad IVF fluids with improvement in mental status.  Developed glucoses on 1/20 in 400s without DKA or HHS. Required IV insulin intensive drip to bring down.  Endocrinology following and managing insulins   Patient was discharged on lantus 20 units subq BID and aspart 9 units TID with meals and MDSSI. Metformin 500 mg BID was restarted at discharge    Focal seizures  EEG upon admission demonstrated seizures. Resolved with lacosamide loading. Now on lacosamide 100 mg BID.     NSTEMI (non-ST elevated myocardial infarction)  History of recent STEMI 1/9/2021.  Coronary artery disease  [facility] on 1/9/22 with two vessel coronary artery disease with 100% occlusion of mid RCA and 70% stenosis of proximal LAD, RCA stent x2. LAD lesions not intervened on. Cardiology consulted. Placed on ACS protocol with plavix, ASA, and heparin drip. Troponins peaked 1.5 and has been down trending since. NSTEMI likely type II due to DKA. Will continue on clopidogrel 75 mg daily, ASA 81 mg daily, apixaban 5 mg BID.    SIRS due to non-infectious process without acute organ  dysfunction  BRENDAN. Leukocytosis and hypoxia. Likely driven by seizures and DKA/HHS. Patient empirically started on broad spectrum antibiotics. Blood cultures negative, UA negative, and CXR negative. Then tailored down to zosyn for presumed infection of sacral wound. However, wound does not look infected. Completed Zosyn for five days.      Nausea & vomiting  Possibly due to uncontrolled glucoses. Seemed improved with metoclopramide. Need better control of glucoses.    Hyperkalemia  Admitted with K 6.2. Resolved with insulin drip and IVFs and subsequent resolution of BRENDAN    Dysarthria and anarthria  Improving. ST.     BRENDAN (acute kidney injury)  Patient with acute kidney injury likely d/t IVVD/Dehydration and Pre-renal azotemia Which is currently improving. Labs reviewed- Renal function/electrolytes with Estimated Creatinine Clearance: 47.2 mL/min (A) (based on SCr of 1.5 mg/dL (H)). according to latest data. Monitor urine output and serial BMP and adjust therapy as needed. Avoid nephrotoxins and renally dose meds for GFR listed above.     Resolved with IVFs.     Paroxysmal atrial fibrillation  Patient with Paroxysmal (<7 days) atrial fibrillation which is controlled currently with Beta Blocker, Calcium Channel Blocker and Amiodarone. Patient is currently in sinus rhythm.[id number] Score: 4. Anticoagulation indicated. Anticoagulation done with apixaban. Metoprolol XL 50 mg daily, diltiazem  mg daily and amiodarone 200 mg daily.    Embolic stroke involving right middle cerebral artery  Embolic stroke involving left cerebellar artery  Debility  Newly diagnosed 1/12/2022. PT/OT. Family requesting in-patient rehabilitation.   Continue apixaban, atorvastatin , ASA, metoprolol, diltiazem.    Hypertension associated with diabetes  continue losartan 25 mg daily, metoprolol and diltiazem.      Procedures: none    Consultants: wound care , PMR, endocrinology, epilepsy, cardiology, critical care, vascular  surgery    Discharge Medication List as of 2/2/2022  4:57 PM      START taking these medications    Details   lacosamide (VIMPAT) 100 mg Tab Take 1 tablet (100 mg total) by mouth every 12 (twelve) hours., Starting Wed 2/2/2022, Until Thu 2/2/2023, Normal      promethazine (PHENERGAN) 25 MG tablet Take 1 tablet (25 mg total) by mouth every 6 (six) hours as needed for Nausea., Starting Wed 2/2/2022, Until Wed 2/16/2022 at 2359, No Print      senna-docusate 8.6-50 mg (PERICOLACE) 8.6-50 mg per tablet Take 1 tablet by mouth once daily., Starting Thu 2/3/2022, No Print         CONTINUE these medications which have CHANGED    Details   amiodarone (PACERONE) 200 MG Tab Take 1 tablet (200 mg total) by mouth once daily., Starting Wed 2/2/2022, Normal      ergocalciferol (ERGOCALCIFEROL) 50,000 unit Cap Take 1 capsule (50,000 Units total) by mouth every 7 days., Starting Wed 2/2/2022, Normal      !! insulin aspart U-100 (NOVOLOG) 100 unit/mL (3 mL) InPn pen Inject 1-10 Units into the skin before meals and at bedtime as needed (Hyperglycemia)., Starting Wed 2/2/2022, Until Thu 2/2/2023 at 2359, No Print      !! insulin aspart U-100 (NOVOLOG) 100 unit/mL (3 mL) InPn pen Inject 9 Units into the skin 3 (three) times daily., Starting Wed 2/2/2022, Until Thu 2/2/2023, No Print      insulin glargine (LANTUS) 100 unit/mL injection Inject 20 Units into the skin every evening., Starting Wed 2/2/2022, Until Thu 2/2/2023, Normal      metoprolol succinate (TOPROL-XL) 50 MG 24 hr tablet Take 1 tablet (50 mg total) by mouth once daily., Starting Thu 2/3/2022, Until Fri 2/3/2023, Normal       !! - Potential duplicate medications found. Please discuss with provider.      CONTINUE these medications which have NOT CHANGED    Details   apixaban (ELIQUIS) 5 mg Tab Take 1 tablet (5 mg total) by mouth 2 (two) times daily., Starting Wed 1/19/2022, Normal      aspirin (ECOTRIN) 81 MG EC tablet Take 81 mg by mouth once daily., Until Discontinued,  "Historical Med      atorvastatin (LIPITOR) 40 MG tablet Take 1 tablet (40 mg total) by mouth once daily., Starting Tue 1/11/2022, Until Wed 1/11/2023, Normal      BD ULTRA-FINE SHORT PEN NEEDLE 31 gauge x 5/16" Ndle 3 (three) times daily., Starting Sat 10/2/2021, Historical Med      blood sugar diagnostic Strp 1 strip by Misc.(Non-Drug; Combo Route) route 2 (two) times a day., Starting Wed 1/5/2022, Normal      clopidogreL (PLAVIX) 75 mg tablet Take 1 tablet (75 mg total) by mouth once daily., Starting Fri 1/14/2022, Until Sat 1/14/2023, Normal      diltiaZEM (CARDIZEM CD) 120 MG Cp24 Take 1 capsule (120 mg total) by mouth once daily., Starting Wed 1/5/2022, Normal      lancets (ONETOUCH ULTRASOFT LANCETS) Misc 1 lancet by Misc.(Non-Drug; Combo Route) route 2 (two) times a day., Starting Wed 1/5/2022, Normal      losartan (COZAAR) 25 MG tablet Take 1 tablet (25 mg total) by mouth once daily., Starting Wed 1/5/2022, Normal      metFORMIN (GLUCOPHAGE) 500 MG tablet Take 1 tablet (500 mg total) by mouth 2 (two) times daily with meals., Starting Wed 1/5/2022, Normal      MULTIVITAMIN ORAL Take 1 tablet by mouth once daily. , Starting Fri 1/20/2012, Historical Med      nitroGLYCERIN (NITROSTAT) 0.4 MG SL tablet Place 1 tablet (0.4 mg total) under the tongue every 5 (five) minutes as needed for Chest pain., Starting Tue 1/11/2022, Until Wed 1/11/2023 at 2359, Normal      pantoprazole (PROTONIX) 40 MG tablet Take 1 tablet (40 mg total) by mouth once daily., Starting Fri 1/14/2022, Until Sat 1/14/2023, Normal      pen needle, diabetic (PEN NEEDLE) 30 gauge x 5/16" Ndle 1 Units by Misc.(Non-Drug; Combo Route) route 3 (three) times daily., Starting Sat 10/2/2021, Normal      polycarbophil (FIBERCON) 625 mg tablet Take 1 tablet by mouth once daily. , Starting Fri 1/20/2012, Historical Med         STOP taking these medications       diclofenac sodium (VOLTAREN) 1 % Gel Comments:   Reason for Stopping:         gabapentin " (NEURONTIN) 300 MG capsule Comments:   Reason for Stopping:         hydrocortisone 2.5 % cream Comments:   Reason for Stopping:         ketoconazole (NIZORAL) 2 % cream Comments:   Reason for Stopping:         glucagon, human recombinant, (GLUCAGON EMERGENCY KIT, HUMAN,) 1 mg SolR Comments:   Reason for Stopping:               Discharge Diet:diabetic diet: 2000 calorie     Activity: activity as tolerated    Discharge Condition: Good    Disposition: Home or Self Care    Tests pending at the time of discharge: none      Time spent  on the discharge of the patient including review of hospital course with the patient. reviewing discharge medications and arranging follow-up care 35 min    Discharge examination Patient was seen and examined on the date of discharge and determined to be suitable for discharge.    Discharge plan     Future Appointments   Date Time Provider Department Center   3/8/2022 11:00 AM Gaurav Calvillo DO [facility] NEURO [facility]   4/6/2022  1:00 PM Basim Guerrero MD [facility]       Adrienne Albright MD

## 2022-02-14 ENCOUNTER — DOCUMENT SCAN (OUTPATIENT)
Dept: HOME HEALTH SERVICES | Facility: HOSPITAL | Age: 77
End: 2022-02-14
Payer: MEDICARE

## 2022-02-16 ENCOUNTER — PATIENT MESSAGE (OUTPATIENT)
Dept: PRIMARY CARE CLINIC | Facility: CLINIC | Age: 77
End: 2022-02-16
Payer: MEDICARE

## 2022-02-18 ENCOUNTER — NURSE TRIAGE (OUTPATIENT)
Dept: ADMINISTRATIVE | Facility: CLINIC | Age: 77
End: 2022-02-18
Payer: MEDICARE

## 2022-02-18 ENCOUNTER — PATIENT MESSAGE (OUTPATIENT)
Dept: ADMINISTRATIVE | Facility: CLINIC | Age: 77
End: 2022-02-18
Payer: MEDICARE

## 2022-02-18 ENCOUNTER — INFUSION (OUTPATIENT)
Dept: INFECTIOUS DISEASES | Facility: HOSPITAL | Age: 77
DRG: 871 | End: 2022-02-18
Attending: EMERGENCY MEDICINE
Payer: MEDICARE

## 2022-02-18 VITALS
BODY MASS INDEX: 26.82 KG/M2 | HEIGHT: 74 IN | OXYGEN SATURATION: 97 % | TEMPERATURE: 98 F | SYSTOLIC BLOOD PRESSURE: 100 MMHG | WEIGHT: 209 LBS | DIASTOLIC BLOOD PRESSURE: 72 MMHG | RESPIRATION RATE: 16 BRPM | HEART RATE: 74 BPM

## 2022-02-18 DIAGNOSIS — U07.1 COVID-19: ICD-10-CM

## 2022-02-18 PROBLEM — I25.110 CORONARY ARTERY DISEASE INVOLVING NATIVE CORONARY ARTERY OF NATIVE HEART WITH UNSTABLE ANGINA PECTORIS: Chronic | Status: ACTIVE | Noted: 2022-01-09

## 2022-02-18 PROBLEM — R56.9 FOCAL SEIZURES: Chronic | Status: ACTIVE | Noted: 2022-01-26

## 2022-02-18 PROBLEM — I25.2 HISTORY OF ST ELEVATION MYOCARDIAL INFARCTION (STEMI): Chronic | Status: ACTIVE | Noted: 2022-01-19

## 2022-02-18 PROBLEM — I21.4 NSTEMI (NON-ST ELEVATED MYOCARDIAL INFARCTION): Chronic | Status: ACTIVE | Noted: 2022-01-25

## 2022-02-18 PROBLEM — E11.11 DIABETIC KETOACIDOSIS WITH COMA ASSOCIATED WITH TYPE 2 DIABETES MELLITUS: Chronic | Status: ACTIVE | Noted: 2022-01-29

## 2022-02-18 PROBLEM — I48.0 PAROXYSMAL ATRIAL FIBRILLATION: Chronic | Status: ACTIVE | Noted: 2022-01-12

## 2022-02-18 PROBLEM — I63.411 EMBOLIC STROKE INVOLVING RIGHT MIDDLE CEREBRAL ARTERY: Chronic | Status: ACTIVE | Noted: 2022-01-12

## 2022-02-18 PROCEDURE — M0247 HC IV INFUSION, SOTROVIMAB, INCL POST ADMIN MONIT: HCPCS | Performed by: INTERNAL MEDICINE

## 2022-02-18 PROCEDURE — 63600175 PHARM REV CODE 636 W HCPCS: Mod: HCNC | Performed by: INTERNAL MEDICINE

## 2022-02-18 PROCEDURE — 25000003 PHARM REV CODE 250: Mod: HCNC | Performed by: INTERNAL MEDICINE

## 2022-02-18 RX ORDER — ONDANSETRON 4 MG/1
4 TABLET, ORALLY DISINTEGRATING ORAL
Status: DISCONTINUED | OUTPATIENT
Start: 2022-02-18 | End: 2022-02-25 | Stop reason: HOSPADM

## 2022-02-18 RX ORDER — ACETAMINOPHEN 325 MG/1
650 TABLET ORAL ONCE AS NEEDED
Status: DISCONTINUED | OUTPATIENT
Start: 2022-02-18 | End: 2022-03-24 | Stop reason: HOSPADM

## 2022-02-18 RX ORDER — SODIUM CHLORIDE 0.9 % (FLUSH) 0.9 %
10 SYRINGE (ML) INJECTION
Status: DISCONTINUED | OUTPATIENT
Start: 2022-02-18 | End: 2022-02-25 | Stop reason: HOSPADM

## 2022-02-18 RX ORDER — DIPHENHYDRAMINE HYDROCHLORIDE 50 MG/ML
25 INJECTION INTRAMUSCULAR; INTRAVENOUS
Status: DISCONTINUED | OUTPATIENT
Start: 2022-02-18 | End: 2022-02-25 | Stop reason: HOSPADM

## 2022-02-18 RX ORDER — ALBUTEROL SULFATE 90 UG/1
2 AEROSOL, METERED RESPIRATORY (INHALATION)
Status: DISCONTINUED | OUTPATIENT
Start: 2022-02-18 | End: 2022-02-25 | Stop reason: HOSPADM

## 2022-02-18 RX ORDER — EPINEPHRINE 0.3 MG/.3ML
0.3 INJECTION SUBCUTANEOUS
Status: DISCONTINUED | OUTPATIENT
Start: 2022-02-18 | End: 2022-02-25 | Stop reason: HOSPADM

## 2022-02-18 RX ADMIN — SODIUM CHLORIDE 500 MG: 9 INJECTION, SOLUTION INTRAVENOUS at 10:02

## 2022-02-18 NOTE — TELEPHONE ENCOUNTER
Surveillance Enrollment Attempt #1:     Pt called for surveillance enrollment. Program overview provided. Pt expressed interest in program but states now is not a good time and asked for call back tomorrow. Will try back at a later time.     Reason for Disposition   [1] Follow-up call to recent contact AND [2] information only call, no triage required    Additional Information   Negative: RN needs further essential information from caller in order to complete triage   Negative: Requesting regular office appointment   Negative: [1] Caller requesting NON-URGENT health information AND [2] PCP's office is the best resource    Protocols used: INFORMATION ONLY CALL - NO TRIAGE-A-

## 2022-02-18 NOTE — PROGRESS NOTES
Patient arrives for sotrovimab infusion. Ambulatory. Pt AAox3. No distress noted. RR even and unlabored.     Symptoms and onset date:  02/17/2022 no covid symptoms     Tested COVID + on 02/17/2022

## 2022-02-18 NOTE — PROGRESS NOTES
Patient remains with no signs of complications noted. Patient received sotrovimab infusion with filtered tubing according to FDA recommendations and [facility] SOP without complications noted and left with mask in place. Drug fact sheet provided. Pt discharged home. Ambulatory. Remains AAox3. No distress noted. RR even and unlabored.

## 2022-02-19 ENCOUNTER — NURSE TRIAGE (OUTPATIENT)
Dept: ADMINISTRATIVE | Facility: CLINIC | Age: 77
End: 2022-02-19
Payer: MEDICARE

## 2022-02-19 ENCOUNTER — HOSPITAL ENCOUNTER (INPATIENT)
Facility: HOSPITAL | Age: 77
LOS: 19 days | Discharge: NURSING, REHAB OR LONG-TERM CARE | DRG: 871 | End: 2022-03-10
Attending: EMERGENCY MEDICINE | Admitting: INTERNAL MEDICINE
Payer: MEDICARE

## 2022-02-19 DIAGNOSIS — R65.20 SEVERE SEPSIS: ICD-10-CM

## 2022-02-19 DIAGNOSIS — L89.320 PRESSURE INJURY OF LEFT BUTTOCK, UNSTAGEABLE: ICD-10-CM

## 2022-02-19 DIAGNOSIS — R00.1 BRADYCARDIA: ICD-10-CM

## 2022-02-19 DIAGNOSIS — I15.2 HYPERTENSION ASSOCIATED WITH DIABETES: Chronic | ICD-10-CM

## 2022-02-19 DIAGNOSIS — E11.11 DIABETIC KETOACIDOSIS WITH COMA ASSOCIATED WITH TYPE 2 DIABETES MELLITUS: Chronic | ICD-10-CM

## 2022-02-19 DIAGNOSIS — Z79.4 TYPE 2 DIABETES MELLITUS WITH HYPERGLYCEMIA, WITH LONG-TERM CURRENT USE OF INSULIN: Chronic | ICD-10-CM

## 2022-02-19 DIAGNOSIS — E11.65 TYPE 2 DIABETES MELLITUS WITH HYPERGLYCEMIA, WITH LONG-TERM CURRENT USE OF INSULIN: Chronic | ICD-10-CM

## 2022-02-19 DIAGNOSIS — E13.11 DIABETIC KETOACIDOSIS WITH COMA ASSOCIATED WITH OTHER SPECIFIED DIABETES MELLITUS: ICD-10-CM

## 2022-02-19 DIAGNOSIS — R56.9 FOCAL SEIZURES: Chronic | ICD-10-CM

## 2022-02-19 DIAGNOSIS — R57.9 SHOCK: ICD-10-CM

## 2022-02-19 DIAGNOSIS — R00.0 WIDE-COMPLEX TACHYCARDIA: ICD-10-CM

## 2022-02-19 DIAGNOSIS — I95.9 HYPOTENSION: ICD-10-CM

## 2022-02-19 DIAGNOSIS — I95.9 HYPOTENSION, UNSPECIFIED HYPOTENSION TYPE: ICD-10-CM

## 2022-02-19 DIAGNOSIS — E11.59 HYPERTENSION ASSOCIATED WITH DIABETES: Chronic | ICD-10-CM

## 2022-02-19 DIAGNOSIS — U07.1 COVID-19 VIRUS INFECTION: ICD-10-CM

## 2022-02-19 DIAGNOSIS — I48.0 PAROXYSMAL ATRIAL FIBRILLATION: Chronic | ICD-10-CM

## 2022-02-19 DIAGNOSIS — R06.02 SOB (SHORTNESS OF BREATH): ICD-10-CM

## 2022-02-19 DIAGNOSIS — R94.31 PROLONGED Q-T INTERVAL ON ECG: ICD-10-CM

## 2022-02-19 DIAGNOSIS — A41.9 SEVERE SEPSIS: ICD-10-CM

## 2022-02-19 DIAGNOSIS — I25.110 CORONARY ARTERY DISEASE INVOLVING NATIVE CORONARY ARTERY OF NATIVE HEART WITH UNSTABLE ANGINA PECTORIS: Chronic | ICD-10-CM

## 2022-02-19 DIAGNOSIS — E87.20 METABOLIC ACIDOSIS: Primary | ICD-10-CM

## 2022-02-19 DIAGNOSIS — I63.411 EMBOLIC STROKE INVOLVING RIGHT MIDDLE CEREBRAL ARTERY: Chronic | ICD-10-CM

## 2022-02-19 DIAGNOSIS — R07.9 CHEST PAIN: ICD-10-CM

## 2022-02-19 DIAGNOSIS — G93.41 ENCEPHALOPATHY, METABOLIC: ICD-10-CM

## 2022-02-19 DIAGNOSIS — Z75.8 DISCHARGE PLANNING ISSUES: ICD-10-CM

## 2022-02-19 DIAGNOSIS — I63.442 EMBOLIC STROKE INVOLVING LEFT CEREBELLAR ARTERY: ICD-10-CM

## 2022-02-19 LAB
ALBUMIN SERPL BCP-MCNC: 2.4 G/DL (ref 3.5–5.2)
ALLENS TEST: ABNORMAL
ALP SERPL-CCNC: 137 U/L (ref 55–135)
ALT SERPL W/O P-5'-P-CCNC: 11 U/L (ref 10–44)
ANION GAP SERPL CALC-SCNC: ABNORMAL MMOL/L (ref 8–16)
APTT BLDCRRT: 30.6 SEC (ref 21–32)
AST SERPL-CCNC: 14 U/L (ref 10–40)
B-OH-BUTYR BLD STRIP-SCNC: 5.3 MMOL/L (ref 0–0.5)
BACTERIA #/AREA URNS AUTO: ABNORMAL /HPF
BASOPHILS # BLD AUTO: 0.12 K/UL (ref 0–0.2)
BASOPHILS NFR BLD: 0.7 % (ref 0–1.9)
BILIRUB SERPL-MCNC: 0.4 MG/DL (ref 0.1–1)
BILIRUB UR QL STRIP: NEGATIVE
BNP SERPL-MCNC: 481 PG/ML (ref 0–99)
BUN SERPL-MCNC: 37 MG/DL (ref 8–23)
CALCIUM SERPL-MCNC: 9.6 MG/DL (ref 8.7–10.5)
CHLORIDE SERPL-SCNC: 89 MMOL/L (ref 95–110)
CK SERPL-CCNC: 95 U/L (ref 20–200)
CLARITY UR REFRACT.AUTO: ABNORMAL
CO2 SERPL-SCNC: <5 MMOL/L (ref 23–29)
COLOR UR AUTO: YELLOW
CREAT SERPL-MCNC: 3.1 MG/DL (ref 0.5–1.4)
D DIMER PPP IA.FEU-MCNC: 0.84 MG/L FEU
DELSYS: ABNORMAL
DIFFERENTIAL METHOD: ABNORMAL
EOSINOPHIL # BLD AUTO: 0.1 K/UL (ref 0–0.5)
EOSINOPHIL NFR BLD: 0.3 % (ref 0–8)
ERYTHROCYTE [DISTWIDTH] IN BLOOD BY AUTOMATED COUNT: 14 % (ref 11.5–14.5)
ERYTHROCYTE [SEDIMENTATION RATE] IN BLOOD BY WESTERGREN METHOD: 92 MM/HR (ref 0–23)
EST. GFR  (AFRICAN AMERICAN): 21.1 ML/MIN/1.73 M^2
EST. GFR  (NON AFRICAN AMERICAN): 18.3 ML/MIN/1.73 M^2
FERRITIN SERPL-MCNC: 564 NG/ML (ref 20–300)
GLUCOSE SERPL-MCNC: 1109 MG/DL (ref 70–110)
GLUCOSE UR QL STRIP: ABNORMAL
HCO3 UR-SCNC: 5.8 MMOL/L (ref 24–28)
HCT VFR BLD AUTO: 44.5 % (ref 40–54)
HGB BLD-MCNC: 12.1 G/DL (ref 14–18)
HGB UR QL STRIP: ABNORMAL
IMM GRANULOCYTES # BLD AUTO: 0.37 K/UL (ref 0–0.04)
IMM GRANULOCYTES NFR BLD AUTO: 2.1 % (ref 0–0.5)
INR PPP: 1.2 (ref 0.8–1.2)
KETONES UR QL STRIP: ABNORMAL
LACTATE SERPL-SCNC: 11.5 MMOL/L (ref 0.5–2.2)
LDH SERPL L TO P-CCNC: 318 U/L (ref 110–260)
LEUKOCYTE ESTERASE UR QL STRIP: NEGATIVE
LYMPHOCYTES # BLD AUTO: 2 K/UL (ref 1–4.8)
LYMPHOCYTES NFR BLD: 11.3 % (ref 18–48)
MAGNESIUM SERPL-MCNC: 1.9 MG/DL (ref 1.6–2.6)
MCH RBC QN AUTO: 27.8 PG (ref 27–31)
MCHC RBC AUTO-ENTMCNC: 27.2 G/DL (ref 32–36)
MCV RBC AUTO: 102 FL (ref 82–98)
MICROSCOPIC COMMENT: ABNORMAL
MODE: ABNORMAL
MONOCYTES # BLD AUTO: 1 K/UL (ref 0.3–1)
MONOCYTES NFR BLD: 5.7 % (ref 4–15)
NEUTROPHILS # BLD AUTO: 14.2 K/UL (ref 1.8–7.7)
NEUTROPHILS NFR BLD: 79.9 % (ref 38–73)
NITRITE UR QL STRIP: NEGATIVE
NRBC BLD-RTO: 0 /100 WBC
PCO2 BLDA: 15.6 MMHG (ref 35–45)
PH SMN: 7.17 [PH] (ref 7.35–7.45)
PH UR STRIP: 5 [PH] (ref 5–8)
PHOSPHATE SERPL-MCNC: 9.1 MG/DL (ref 2.7–4.5)
PLATELET # BLD AUTO: 380 K/UL (ref 150–450)
PMV BLD AUTO: 9.8 FL (ref 9.2–12.9)
PO2 BLDA: 127 MMHG (ref 80–100)
POC BE: -23 MMOL/L
POC SATURATED O2: 98 % (ref 95–100)
POC TCO2: 6 MMOL/L (ref 23–27)
POCT GLUCOSE: >500 MG/DL (ref 70–110)
POTASSIUM SERPL-SCNC: 6.4 MMOL/L (ref 3.5–5.1)
PROCALCITONIN SERPL IA-MCNC: 0.93 NG/ML
PROT SERPL-MCNC: 7.2 G/DL (ref 6–8.4)
PROT UR QL STRIP: NEGATIVE
PROTHROMBIN TIME: 12.4 SEC (ref 9–12.5)
RBC # BLD AUTO: 4.36 M/UL (ref 4.6–6.2)
RBC #/AREA URNS AUTO: >100 /HPF (ref 0–4)
SAMPLE: ABNORMAL
SITE: ABNORMAL
SODIUM SERPL-SCNC: 135 MMOL/L (ref 136–145)
SP GR UR STRIP: 1.02 (ref 1–1.03)
SQUAMOUS #/AREA URNS AUTO: 0 /HPF
TROPONIN I SERPL DL<=0.01 NG/ML-MCNC: 0.04 NG/ML (ref 0–0.03)
URN SPEC COLLECT METH UR: ABNORMAL
WBC # BLD AUTO: 17.76 K/UL (ref 3.9–12.7)
WBC #/AREA URNS AUTO: 31 /HPF (ref 0–5)
YEAST UR QL AUTO: ABNORMAL

## 2022-02-19 PROCEDURE — 83605 ASSAY OF LACTIC ACID: CPT | Mod: HCNC | Performed by: EMERGENCY MEDICINE

## 2022-02-19 PROCEDURE — 83735 ASSAY OF MAGNESIUM: CPT | Mod: HCNC | Performed by: STUDENT IN AN ORGANIZED HEALTH CARE EDUCATION/TRAINING PROGRAM

## 2022-02-19 PROCEDURE — 99291 PR CRITICAL CARE, E/M 30-74 MINUTES: ICD-10-PCS | Mod: HCNC,,, | Performed by: EMERGENCY MEDICINE

## 2022-02-19 PROCEDURE — 99900035 HC TECH TIME PER 15 MIN (STAT): Mod: HCNC

## 2022-02-19 PROCEDURE — 84484 ASSAY OF TROPONIN QUANT: CPT | Mod: HCNC | Performed by: STUDENT IN AN ORGANIZED HEALTH CARE EDUCATION/TRAINING PROGRAM

## 2022-02-19 PROCEDURE — 82728 ASSAY OF FERRITIN: CPT | Mod: HCNC | Performed by: STUDENT IN AN ORGANIZED HEALTH CARE EDUCATION/TRAINING PROGRAM

## 2022-02-19 PROCEDURE — 93005 ELECTROCARDIOGRAM TRACING: CPT | Mod: HCNC

## 2022-02-19 PROCEDURE — 82010 KETONE BODYS QUAN: CPT | Mod: HCNC | Performed by: STUDENT IN AN ORGANIZED HEALTH CARE EDUCATION/TRAINING PROGRAM

## 2022-02-19 PROCEDURE — 83880 ASSAY OF NATRIURETIC PEPTIDE: CPT | Mod: HCNC | Performed by: STUDENT IN AN ORGANIZED HEALTH CARE EDUCATION/TRAINING PROGRAM

## 2022-02-19 PROCEDURE — 63600175 PHARM REV CODE 636 W HCPCS: Mod: HCNC | Performed by: EMERGENCY MEDICINE

## 2022-02-19 PROCEDURE — 93010 ELECTROCARDIOGRAM REPORT: CPT | Mod: HCNC,76,, | Performed by: INTERNAL MEDICINE

## 2022-02-19 PROCEDURE — 96361 HYDRATE IV INFUSION ADD-ON: CPT | Mod: HCNC

## 2022-02-19 PROCEDURE — 99291 CRITICAL CARE FIRST HOUR: CPT | Mod: 25,HCNC

## 2022-02-19 PROCEDURE — 27000207 HC ISOLATION: Mod: HCNC

## 2022-02-19 PROCEDURE — 81001 URINALYSIS AUTO W/SCOPE: CPT | Mod: HCNC | Performed by: EMERGENCY MEDICINE

## 2022-02-19 PROCEDURE — 93010 ELECTROCARDIOGRAM REPORT: CPT | Mod: HCNC,,, | Performed by: INTERNAL MEDICINE

## 2022-02-19 PROCEDURE — 80053 COMPREHEN METABOLIC PANEL: CPT | Mod: HCNC | Performed by: EMERGENCY MEDICINE

## 2022-02-19 PROCEDURE — 94761 N-INVAS EAR/PLS OXIMETRY MLT: CPT | Mod: HCNC

## 2022-02-19 PROCEDURE — 84100 ASSAY OF PHOSPHORUS: CPT | Mod: HCNC | Performed by: STUDENT IN AN ORGANIZED HEALTH CARE EDUCATION/TRAINING PROGRAM

## 2022-02-19 PROCEDURE — 96367 TX/PROPH/DG ADDL SEQ IV INF: CPT | Mod: HCNC

## 2022-02-19 PROCEDURE — 87086 URINE CULTURE/COLONY COUNT: CPT | Mod: HCNC | Performed by: EMERGENCY MEDICINE

## 2022-02-19 PROCEDURE — 99291 CRITICAL CARE FIRST HOUR: CPT | Mod: HCNC,,, | Performed by: EMERGENCY MEDICINE

## 2022-02-19 PROCEDURE — 93010 EKG 12-LEAD: ICD-10-PCS | Mod: HCNC,,, | Performed by: INTERNAL MEDICINE

## 2022-02-19 PROCEDURE — 82962 GLUCOSE BLOOD TEST: CPT | Mod: HCNC

## 2022-02-19 PROCEDURE — 87040 BLOOD CULTURE FOR BACTERIA: CPT | Mod: 59,HCNC | Performed by: EMERGENCY MEDICINE

## 2022-02-19 PROCEDURE — 20000000 HC ICU ROOM: Mod: HCNC

## 2022-02-19 PROCEDURE — 25000003 PHARM REV CODE 250: Mod: HCNC | Performed by: EMERGENCY MEDICINE

## 2022-02-19 PROCEDURE — 96365 THER/PROPH/DIAG IV INF INIT: CPT | Mod: HCNC

## 2022-02-19 PROCEDURE — 85730 THROMBOPLASTIN TIME PARTIAL: CPT | Mod: HCNC | Performed by: STUDENT IN AN ORGANIZED HEALTH CARE EDUCATION/TRAINING PROGRAM

## 2022-02-19 PROCEDURE — 85610 PROTHROMBIN TIME: CPT | Mod: HCNC | Performed by: STUDENT IN AN ORGANIZED HEALTH CARE EDUCATION/TRAINING PROGRAM

## 2022-02-19 PROCEDURE — 84145 PROCALCITONIN (PCT): CPT | Mod: HCNC | Performed by: STUDENT IN AN ORGANIZED HEALTH CARE EDUCATION/TRAINING PROGRAM

## 2022-02-19 PROCEDURE — 85652 RBC SED RATE AUTOMATED: CPT | Mod: HCNC | Performed by: STUDENT IN AN ORGANIZED HEALTH CARE EDUCATION/TRAINING PROGRAM

## 2022-02-19 PROCEDURE — 83615 LACTATE (LD) (LDH) ENZYME: CPT | Mod: HCNC | Performed by: STUDENT IN AN ORGANIZED HEALTH CARE EDUCATION/TRAINING PROGRAM

## 2022-02-19 PROCEDURE — 85025 COMPLETE CBC W/AUTO DIFF WBC: CPT | Mod: HCNC | Performed by: EMERGENCY MEDICINE

## 2022-02-19 PROCEDURE — 82803 BLOOD GASES ANY COMBINATION: CPT | Mod: HCNC

## 2022-02-19 PROCEDURE — 82550 ASSAY OF CK (CPK): CPT | Mod: HCNC | Performed by: STUDENT IN AN ORGANIZED HEALTH CARE EDUCATION/TRAINING PROGRAM

## 2022-02-19 PROCEDURE — 27000221 HC OXYGEN, UP TO 24 HOURS: Mod: HCNC

## 2022-02-19 PROCEDURE — 36600 WITHDRAWAL OF ARTERIAL BLOOD: CPT | Mod: HCNC

## 2022-02-19 PROCEDURE — 63600175 PHARM REV CODE 636 W HCPCS: Mod: HCNC | Performed by: STUDENT IN AN ORGANIZED HEALTH CARE EDUCATION/TRAINING PROGRAM

## 2022-02-19 PROCEDURE — 85379 FIBRIN DEGRADATION QUANT: CPT | Mod: HCNC | Performed by: STUDENT IN AN ORGANIZED HEALTH CARE EDUCATION/TRAINING PROGRAM

## 2022-02-19 PROCEDURE — 25000003 PHARM REV CODE 250: Mod: HCNC | Performed by: STUDENT IN AN ORGANIZED HEALTH CARE EDUCATION/TRAINING PROGRAM

## 2022-02-19 RX ORDER — CLOPIDOGREL BISULFATE 75 MG/1
75 TABLET ORAL DAILY
Status: DISCONTINUED | OUTPATIENT
Start: 2022-02-20 | End: 2022-03-10 | Stop reason: HOSPADM

## 2022-02-19 RX ORDER — SODIUM CHLORIDE 0.9 % (FLUSH) 0.9 %
10 SYRINGE (ML) INJECTION
Status: DISCONTINUED | OUTPATIENT
Start: 2022-02-19 | End: 2022-02-21

## 2022-02-19 RX ORDER — AMIODARONE HYDROCHLORIDE 200 MG/1
200 TABLET ORAL DAILY
Status: DISCONTINUED | OUTPATIENT
Start: 2022-02-20 | End: 2022-03-10 | Stop reason: HOSPADM

## 2022-02-19 RX ORDER — SODIUM CHLORIDE, SODIUM LACTATE, POTASSIUM CHLORIDE, CALCIUM CHLORIDE 600; 310; 30; 20 MG/100ML; MG/100ML; MG/100ML; MG/100ML
INJECTION, SOLUTION INTRAVENOUS CONTINUOUS
Status: DISCONTINUED | OUTPATIENT
Start: 2022-02-20 | End: 2022-02-20

## 2022-02-19 RX ORDER — ASCORBIC ACID 500 MG
500 TABLET ORAL 2 TIMES DAILY
Status: DISCONTINUED | OUTPATIENT
Start: 2022-02-19 | End: 2022-03-10 | Stop reason: HOSPADM

## 2022-02-19 RX ORDER — CEFEPIME HYDROCHLORIDE 1 G/50ML
2 INJECTION, SOLUTION INTRAVENOUS
Status: DISCONTINUED | OUTPATIENT
Start: 2022-02-20 | End: 2022-02-19

## 2022-02-19 RX ORDER — GLUCAGON 1 MG
1 KIT INJECTION
Status: DISCONTINUED | OUTPATIENT
Start: 2022-02-19 | End: 2022-02-21

## 2022-02-19 RX ORDER — DOXYCYCLINE HYCLATE 100 MG
100 TABLET ORAL EVERY 12 HOURS
Status: DISCONTINUED | OUTPATIENT
Start: 2022-02-19 | End: 2022-02-25

## 2022-02-19 RX ORDER — IBUPROFEN 200 MG
16 TABLET ORAL
Status: DISCONTINUED | OUTPATIENT
Start: 2022-02-19 | End: 2022-02-21

## 2022-02-19 RX ORDER — SODIUM CHLORIDE 0.9 % (FLUSH) 0.9 %
10 SYRINGE (ML) INJECTION
Status: DISCONTINUED | OUTPATIENT
Start: 2022-02-19 | End: 2022-03-10 | Stop reason: HOSPADM

## 2022-02-19 RX ORDER — ATORVASTATIN CALCIUM 20 MG/1
40 TABLET, FILM COATED ORAL DAILY
Status: DISCONTINUED | OUTPATIENT
Start: 2022-02-20 | End: 2022-03-10 | Stop reason: HOSPADM

## 2022-02-19 RX ORDER — DEXAMETHASONE SODIUM PHOSPHATE 4 MG/ML
4 INJECTION, SOLUTION INTRA-ARTICULAR; INTRALESIONAL; INTRAMUSCULAR; INTRAVENOUS; SOFT TISSUE EVERY 24 HOURS
Status: DISCONTINUED | OUTPATIENT
Start: 2022-02-20 | End: 2022-02-20

## 2022-02-19 RX ORDER — ALBUTEROL SULFATE 90 UG/1
2 AEROSOL, METERED RESPIRATORY (INHALATION) EVERY 6 HOURS
Status: DISCONTINUED | OUTPATIENT
Start: 2022-02-20 | End: 2022-02-25

## 2022-02-19 RX ORDER — LACOSAMIDE 50 MG/1
100 TABLET ORAL EVERY 12 HOURS
Status: DISCONTINUED | OUTPATIENT
Start: 2022-02-20 | End: 2022-02-20

## 2022-02-19 RX ORDER — IBUPROFEN 200 MG
24 TABLET ORAL
Status: DISCONTINUED | OUTPATIENT
Start: 2022-02-19 | End: 2022-02-21

## 2022-02-19 RX ORDER — MUPIROCIN 20 MG/G
OINTMENT TOPICAL 2 TIMES DAILY
Status: COMPLETED | OUTPATIENT
Start: 2022-02-20 | End: 2022-02-24

## 2022-02-19 RX ORDER — PANTOPRAZOLE SODIUM 40 MG/1
40 TABLET, DELAYED RELEASE ORAL DAILY
Status: DISCONTINUED | OUTPATIENT
Start: 2022-02-20 | End: 2022-02-25

## 2022-02-19 RX ORDER — ASPIRIN 81 MG/1
81 TABLET ORAL DAILY
Status: DISCONTINUED | OUTPATIENT
Start: 2022-02-20 | End: 2022-03-10 | Stop reason: HOSPADM

## 2022-02-19 RX ADMIN — SODIUM CHLORIDE 1000 ML: 0.9 INJECTION, SOLUTION INTRAVENOUS at 09:02

## 2022-02-19 RX ADMIN — INSULIN HUMAN 4 UNITS/HR: 1 INJECTION, SOLUTION INTRAVENOUS at 09:02

## 2022-02-19 RX ADMIN — PIPERACILLIN AND TAZOBACTAM 4.5 G: 4; .5 INJECTION, POWDER, LYOPHILIZED, FOR SOLUTION INTRAVENOUS; PARENTERAL at 08:02

## 2022-02-19 RX ADMIN — CALCIUM GLUCONATE 1 G: 98 INJECTION, SOLUTION INTRAVENOUS at 10:02

## 2022-02-19 RX ADMIN — SODIUM CHLORIDE, SODIUM LACTATE, POTASSIUM CHLORIDE, AND CALCIUM CHLORIDE 2844 ML: .6; .31; .03; .02 INJECTION, SOLUTION INTRAVENOUS at 07:02

## 2022-02-19 RX ADMIN — CALCIUM GLUCONATE 1 G: 98 INJECTION, SOLUTION INTRAVENOUS at 08:02

## 2022-02-19 RX ADMIN — SODIUM CHLORIDE, SODIUM LACTATE, POTASSIUM CHLORIDE, AND CALCIUM CHLORIDE 1000 ML: .6; .31; .03; .02 INJECTION, SOLUTION INTRAVENOUS at 09:02

## 2022-02-19 RX ADMIN — VANCOMYCIN HYDROCHLORIDE 2250 MG: 1.25 INJECTION, POWDER, LYOPHILIZED, FOR SOLUTION INTRAVENOUS at 10:02

## 2022-02-19 NOTE — TELEPHONE ENCOUNTER
1st enrollment call - Program review given to pt but reports his wife currently is not home and has the cell phone.  VM left on mobile number and and my chart message sent.     Reason for Disposition   [1] Follow-up call to recent contact AND [2] information only call, no triage required    Protocols used: INFORMATION ONLY CALL - NO TRIAGE-A-

## 2022-02-20 PROBLEM — Z51.5 PALLIATIVE CARE STATUS: Status: ACTIVE | Noted: 2022-02-20

## 2022-02-20 PROBLEM — R65.20 SEVERE SEPSIS: Status: ACTIVE | Noted: 2022-02-20

## 2022-02-20 PROBLEM — J96.01 ACUTE HYPOXEMIC RESPIRATORY FAILURE: Status: ACTIVE | Noted: 2022-02-20

## 2022-02-20 PROBLEM — A41.9 SEVERE SEPSIS: Status: ACTIVE | Noted: 2022-02-20

## 2022-02-20 LAB
ALBUMIN SERPL BCP-MCNC: 2 G/DL (ref 3.5–5.2)
ALP SERPL-CCNC: 111 U/L (ref 55–135)
ALT SERPL W/O P-5'-P-CCNC: 9 U/L (ref 10–44)
ANION GAP SERPL CALC-SCNC: 11 MMOL/L (ref 8–16)
ANION GAP SERPL CALC-SCNC: 20 MMOL/L (ref 8–16)
ANION GAP SERPL CALC-SCNC: 20 MMOL/L (ref 8–16)
ANION GAP SERPL CALC-SCNC: 28 MMOL/L (ref 8–16)
AST SERPL-CCNC: 24 U/L (ref 10–40)
BASOPHILS # BLD AUTO: 0.04 K/UL (ref 0–0.2)
BASOPHILS NFR BLD: 0.3 % (ref 0–1.9)
BILIRUB SERPL-MCNC: 0.3 MG/DL (ref 0.1–1)
BUN SERPL-MCNC: 26 MG/DL (ref 8–23)
BUN SERPL-MCNC: 30 MG/DL (ref 8–23)
BUN SERPL-MCNC: 31 MG/DL (ref 8–23)
BUN SERPL-MCNC: 36 MG/DL (ref 8–23)
BUN SERPL-MCNC: 36 MG/DL (ref 8–23)
BUN SERPL-MCNC: 39 MG/DL (ref 8–23)
CALCIUM SERPL-MCNC: 7.9 MG/DL (ref 8.7–10.5)
CALCIUM SERPL-MCNC: 8.5 MG/DL (ref 8.7–10.5)
CALCIUM SERPL-MCNC: 8.6 MG/DL (ref 8.7–10.5)
CALCIUM SERPL-MCNC: 8.7 MG/DL (ref 8.7–10.5)
CHLORIDE SERPL-SCNC: 101 MMOL/L (ref 95–110)
CHLORIDE SERPL-SCNC: 101 MMOL/L (ref 95–110)
CHLORIDE SERPL-SCNC: 103 MMOL/L (ref 95–110)
CHLORIDE SERPL-SCNC: 104 MMOL/L (ref 95–110)
CHLORIDE SERPL-SCNC: 107 MMOL/L (ref 95–110)
CHLORIDE SERPL-SCNC: 95 MMOL/L (ref 95–110)
CO2 SERPL-SCNC: 10 MMOL/L (ref 23–29)
CO2 SERPL-SCNC: 15 MMOL/L (ref 23–29)
CO2 SERPL-SCNC: 15 MMOL/L (ref 23–29)
CO2 SERPL-SCNC: 23 MMOL/L (ref 23–29)
CO2 SERPL-SCNC: 23 MMOL/L (ref 23–29)
CO2 SERPL-SCNC: 27 MMOL/L (ref 23–29)
CREAT SERPL-MCNC: 1.9 MG/DL (ref 0.5–1.4)
CREAT SERPL-MCNC: 2.1 MG/DL (ref 0.5–1.4)
CREAT SERPL-MCNC: 2.3 MG/DL (ref 0.5–1.4)
CREAT SERPL-MCNC: 3 MG/DL (ref 0.5–1.4)
DIFFERENTIAL METHOD: ABNORMAL
EOSINOPHIL # BLD AUTO: 0 K/UL (ref 0–0.5)
EOSINOPHIL NFR BLD: 0.1 % (ref 0–8)
ERYTHROCYTE [DISTWIDTH] IN BLOOD BY AUTOMATED COUNT: 14.1 % (ref 11.5–14.5)
EST. GFR  (AFRICAN AMERICAN): 22 ML/MIN/1.73 M^2
EST. GFR  (AFRICAN AMERICAN): 30.3 ML/MIN/1.73 M^2
EST. GFR  (AFRICAN AMERICAN): 33.8 ML/MIN/1.73 M^2
EST. GFR  (AFRICAN AMERICAN): 38.2 ML/MIN/1.73 M^2
EST. GFR  (NON AFRICAN AMERICAN): 19 ML/MIN/1.73 M^2
EST. GFR  (NON AFRICAN AMERICAN): 26.2 ML/MIN/1.73 M^2
EST. GFR  (NON AFRICAN AMERICAN): 29.3 ML/MIN/1.73 M^2
EST. GFR  (NON AFRICAN AMERICAN): 33 ML/MIN/1.73 M^2
GLUCOSE SERPL-MCNC: 223 MG/DL (ref 70–110)
GLUCOSE SERPL-MCNC: 386 MG/DL (ref 70–110)
GLUCOSE SERPL-MCNC: 54 MG/DL (ref 70–110)
GLUCOSE SERPL-MCNC: 727 MG/DL (ref 70–110)
GLUCOSE SERPL-MCNC: 727 MG/DL (ref 70–110)
GLUCOSE SERPL-MCNC: 957 MG/DL (ref 70–110)
HCT VFR BLD AUTO: 34.2 % (ref 40–54)
HGB BLD-MCNC: 10.3 G/DL (ref 14–18)
IMM GRANULOCYTES # BLD AUTO: 0.13 K/UL (ref 0–0.04)
IMM GRANULOCYTES NFR BLD AUTO: 0.9 % (ref 0–0.5)
LACTATE SERPL-SCNC: 4.4 MMOL/L (ref 0.5–2.2)
LACTATE SERPL-SCNC: 4.9 MMOL/L (ref 0.5–2.2)
LYMPHOCYTES # BLD AUTO: 1.2 K/UL (ref 1–4.8)
LYMPHOCYTES NFR BLD: 8.3 % (ref 18–48)
MAGNESIUM SERPL-MCNC: 1.8 MG/DL (ref 1.6–2.6)
MCH RBC QN AUTO: 28.9 PG (ref 27–31)
MCHC RBC AUTO-ENTMCNC: 30.1 G/DL (ref 32–36)
MCV RBC AUTO: 96 FL (ref 82–98)
MONOCYTES # BLD AUTO: 0.9 K/UL (ref 0.3–1)
MONOCYTES NFR BLD: 5.9 % (ref 4–15)
NEUTROPHILS # BLD AUTO: 12.5 K/UL (ref 1.8–7.7)
NEUTROPHILS NFR BLD: 84.5 % (ref 38–73)
NRBC BLD-RTO: 0 /100 WBC
PHOSPHATE SERPL-MCNC: 1.9 MG/DL (ref 2.7–4.5)
PHOSPHATE SERPL-MCNC: 2.3 MG/DL (ref 2.7–4.5)
PHOSPHATE SERPL-MCNC: 3.1 MG/DL (ref 2.7–4.5)
PHOSPHATE SERPL-MCNC: 3.9 MG/DL (ref 2.7–4.5)
PHOSPHATE SERPL-MCNC: 5.2 MG/DL (ref 2.7–4.5)
PLATELET # BLD AUTO: 256 K/UL (ref 150–450)
PMV BLD AUTO: 9.5 FL (ref 9.2–12.9)
POCT GLUCOSE: 102 MG/DL (ref 70–110)
POCT GLUCOSE: 105 MG/DL (ref 70–110)
POCT GLUCOSE: 128 MG/DL (ref 70–110)
POCT GLUCOSE: 130 MG/DL (ref 70–110)
POCT GLUCOSE: 158 MG/DL (ref 70–110)
POCT GLUCOSE: 202 MG/DL (ref 70–110)
POCT GLUCOSE: 258 MG/DL (ref 70–110)
POCT GLUCOSE: 267 MG/DL (ref 70–110)
POCT GLUCOSE: 290 MG/DL (ref 70–110)
POCT GLUCOSE: 356 MG/DL (ref 70–110)
POCT GLUCOSE: 401 MG/DL (ref 70–110)
POCT GLUCOSE: 417 MG/DL (ref 70–110)
POCT GLUCOSE: 459 MG/DL (ref 70–110)
POCT GLUCOSE: 75 MG/DL (ref 70–110)
POCT GLUCOSE: 76 MG/DL (ref 70–110)
POCT GLUCOSE: 85 MG/DL (ref 70–110)
POCT GLUCOSE: >500 MG/DL (ref 70–110)
POTASSIUM SERPL-SCNC: 3.4 MMOL/L (ref 3.5–5.1)
POTASSIUM SERPL-SCNC: 3.7 MMOL/L (ref 3.5–5.1)
POTASSIUM SERPL-SCNC: 3.9 MMOL/L (ref 3.5–5.1)
POTASSIUM SERPL-SCNC: 3.9 MMOL/L (ref 3.5–5.1)
POTASSIUM SERPL-SCNC: 4.8 MMOL/L (ref 3.5–5.1)
POTASSIUM SERPL-SCNC: 4.9 MMOL/L (ref 3.5–5.1)
PROT SERPL-MCNC: 6.1 G/DL (ref 6–8.4)
RBC # BLD AUTO: 3.57 M/UL (ref 4.6–6.2)
SODIUM SERPL-SCNC: 133 MMOL/L (ref 136–145)
SODIUM SERPL-SCNC: 136 MMOL/L (ref 136–145)
SODIUM SERPL-SCNC: 136 MMOL/L (ref 136–145)
SODIUM SERPL-SCNC: 137 MMOL/L (ref 136–145)
SODIUM SERPL-SCNC: 138 MMOL/L (ref 136–145)
SODIUM SERPL-SCNC: 145 MMOL/L (ref 136–145)
TROPONIN I SERPL DL<=0.01 NG/ML-MCNC: 0.39 NG/ML (ref 0–0.03)
TROPONIN I SERPL DL<=0.01 NG/ML-MCNC: 0.7 NG/ML (ref 0–0.03)
VANCOMYCIN SERPL-MCNC: 18.8 UG/ML
WBC # BLD AUTO: 14.82 K/UL (ref 3.9–12.7)

## 2022-02-20 PROCEDURE — 25000003 PHARM REV CODE 250: Mod: HCNC | Performed by: NURSE PRACTITIONER

## 2022-02-20 PROCEDURE — 83735 ASSAY OF MAGNESIUM: CPT | Mod: HCNC | Performed by: STUDENT IN AN ORGANIZED HEALTH CARE EDUCATION/TRAINING PROGRAM

## 2022-02-20 PROCEDURE — 80235 DRUG ASSAY LACOSAMIDE: CPT | Mod: HCNC | Performed by: STUDENT IN AN ORGANIZED HEALTH CARE EDUCATION/TRAINING PROGRAM

## 2022-02-20 PROCEDURE — 84100 ASSAY OF PHOSPHORUS: CPT | Mod: HCNC | Performed by: STUDENT IN AN ORGANIZED HEALTH CARE EDUCATION/TRAINING PROGRAM

## 2022-02-20 PROCEDURE — 51702 INSERT TEMP BLADDER CATH: CPT | Mod: HCNC

## 2022-02-20 PROCEDURE — 27000221 HC OXYGEN, UP TO 24 HOURS: Mod: HCNC

## 2022-02-20 PROCEDURE — 83605 ASSAY OF LACTIC ACID: CPT | Mod: 91,HCNC | Performed by: INTERNAL MEDICINE

## 2022-02-20 PROCEDURE — 20000000 HC ICU ROOM: Mod: HCNC

## 2022-02-20 PROCEDURE — 99291 PR CRITICAL CARE, E/M 30-74 MINUTES: ICD-10-PCS | Mod: HCNC,GC,, | Performed by: INTERNAL MEDICINE

## 2022-02-20 PROCEDURE — 63600175 PHARM REV CODE 636 W HCPCS: Mod: HCNC | Performed by: INTERNAL MEDICINE

## 2022-02-20 PROCEDURE — C9399 UNCLASSIFIED DRUGS OR BIOLOG: HCPCS | Mod: HCNC | Performed by: STUDENT IN AN ORGANIZED HEALTH CARE EDUCATION/TRAINING PROGRAM

## 2022-02-20 PROCEDURE — 80048 BASIC METABOLIC PNL TOTAL CA: CPT | Mod: HCNC | Performed by: STUDENT IN AN ORGANIZED HEALTH CARE EDUCATION/TRAINING PROGRAM

## 2022-02-20 PROCEDURE — 63600175 PHARM REV CODE 636 W HCPCS: Mod: HCNC | Performed by: STUDENT IN AN ORGANIZED HEALTH CARE EDUCATION/TRAINING PROGRAM

## 2022-02-20 PROCEDURE — 99900035 HC TECH TIME PER 15 MIN (STAT): Mod: HCNC

## 2022-02-20 PROCEDURE — 84484 ASSAY OF TROPONIN QUANT: CPT | Mod: 91,HCNC | Performed by: STUDENT IN AN ORGANIZED HEALTH CARE EDUCATION/TRAINING PROGRAM

## 2022-02-20 PROCEDURE — 27000207 HC ISOLATION: Mod: HCNC

## 2022-02-20 PROCEDURE — 85025 COMPLETE CBC W/AUTO DIFF WBC: CPT | Mod: HCNC | Performed by: STUDENT IN AN ORGANIZED HEALTH CARE EDUCATION/TRAINING PROGRAM

## 2022-02-20 PROCEDURE — 25000003 PHARM REV CODE 250: Mod: HCNC | Performed by: INTERNAL MEDICINE

## 2022-02-20 PROCEDURE — 99291 CRITICAL CARE FIRST HOUR: CPT | Mod: HCNC,GC,, | Performed by: INTERNAL MEDICINE

## 2022-02-20 PROCEDURE — 94640 AIRWAY INHALATION TREATMENT: CPT | Mod: HCNC

## 2022-02-20 PROCEDURE — 97167 OT EVAL HIGH COMPLEX 60 MIN: CPT | Mod: HCNC

## 2022-02-20 PROCEDURE — 80202 ASSAY OF VANCOMYCIN: CPT | Mod: HCNC | Performed by: STUDENT IN AN ORGANIZED HEALTH CARE EDUCATION/TRAINING PROGRAM

## 2022-02-20 PROCEDURE — 97161 PT EVAL LOW COMPLEX 20 MIN: CPT | Mod: HCNC

## 2022-02-20 PROCEDURE — 25000242 PHARM REV CODE 250 ALT 637 W/ HCPCS: Mod: HCNC | Performed by: STUDENT IN AN ORGANIZED HEALTH CARE EDUCATION/TRAINING PROGRAM

## 2022-02-20 PROCEDURE — 80053 COMPREHEN METABOLIC PANEL: CPT | Mod: HCNC | Performed by: STUDENT IN AN ORGANIZED HEALTH CARE EDUCATION/TRAINING PROGRAM

## 2022-02-20 PROCEDURE — 25000003 PHARM REV CODE 250: Mod: HCNC | Performed by: STUDENT IN AN ORGANIZED HEALTH CARE EDUCATION/TRAINING PROGRAM

## 2022-02-20 PROCEDURE — S5010 5% DEXTROSE AND 0.45% SALINE: HCPCS | Mod: HCNC | Performed by: NURSE PRACTITIONER

## 2022-02-20 PROCEDURE — 94761 N-INVAS EAR/PLS OXIMETRY MLT: CPT | Mod: HCNC

## 2022-02-20 PROCEDURE — 97535 SELF CARE MNGMENT TRAINING: CPT | Mod: HCNC

## 2022-02-20 PROCEDURE — 63600175 PHARM REV CODE 636 W HCPCS: Mod: HCNC | Performed by: NURSE PRACTITIONER

## 2022-02-20 PROCEDURE — 97112 NEUROMUSCULAR REEDUCATION: CPT | Mod: HCNC

## 2022-02-20 PROCEDURE — 83605 ASSAY OF LACTIC ACID: CPT | Mod: HCNC | Performed by: EMERGENCY MEDICINE

## 2022-02-20 PROCEDURE — 84100 ASSAY OF PHOSPHORUS: CPT | Mod: 91,HCNC | Performed by: STUDENT IN AN ORGANIZED HEALTH CARE EDUCATION/TRAINING PROGRAM

## 2022-02-20 PROCEDURE — C9254 INJECTION, LACOSAMIDE: HCPCS | Mod: HCNC | Performed by: STUDENT IN AN ORGANIZED HEALTH CARE EDUCATION/TRAINING PROGRAM

## 2022-02-20 PROCEDURE — 80048 BASIC METABOLIC PNL TOTAL CA: CPT | Mod: 91,HCNC | Performed by: STUDENT IN AN ORGANIZED HEALTH CARE EDUCATION/TRAINING PROGRAM

## 2022-02-20 RX ORDER — GLUCAGON 1 MG
1 KIT INJECTION
Status: DISCONTINUED | OUTPATIENT
Start: 2022-02-20 | End: 2022-03-10 | Stop reason: HOSPADM

## 2022-02-20 RX ORDER — INSULIN ASPART 100 [IU]/ML
9 INJECTION, SOLUTION INTRAVENOUS; SUBCUTANEOUS
Status: DISCONTINUED | OUTPATIENT
Start: 2022-02-21 | End: 2022-02-22

## 2022-02-20 RX ORDER — DEXTROSE MONOHYDRATE AND SODIUM CHLORIDE 5; .45 G/100ML; G/100ML
INJECTION, SOLUTION INTRAVENOUS CONTINUOUS
Status: DISCONTINUED | OUTPATIENT
Start: 2022-02-20 | End: 2022-02-20

## 2022-02-20 RX ORDER — POTASSIUM CHLORIDE 7.45 MG/ML
40 INJECTION INTRAVENOUS ONCE
Status: COMPLETED | OUTPATIENT
Start: 2022-02-20 | End: 2022-02-20

## 2022-02-20 RX ORDER — DEXTROSE MONOHYDRATE, SODIUM CHLORIDE, AND POTASSIUM CHLORIDE 50; 1.49; 4.5 G/1000ML; G/1000ML; G/1000ML
INJECTION, SOLUTION INTRAVENOUS CONTINUOUS
Status: DISCONTINUED | OUTPATIENT
Start: 2022-02-20 | End: 2022-02-20

## 2022-02-20 RX ORDER — LACOSAMIDE 50 MG/1
100 TABLET ORAL EVERY 12 HOURS
Status: DISCONTINUED | OUTPATIENT
Start: 2022-02-20 | End: 2022-03-10 | Stop reason: HOSPADM

## 2022-02-20 RX ORDER — SODIUM,POTASSIUM PHOSPHATES 280-250MG
2 POWDER IN PACKET (EA) ORAL ONCE
Status: COMPLETED | OUTPATIENT
Start: 2022-02-20 | End: 2022-02-20

## 2022-02-20 RX ORDER — ACETAMINOPHEN 500 MG
1000 TABLET ORAL EVERY 8 HOURS
Status: DISCONTINUED | OUTPATIENT
Start: 2022-02-20 | End: 2022-02-22

## 2022-02-20 RX ORDER — IBUPROFEN 200 MG
16 TABLET ORAL
Status: DISCONTINUED | OUTPATIENT
Start: 2022-02-20 | End: 2022-03-10 | Stop reason: HOSPADM

## 2022-02-20 RX ORDER — INSULIN ASPART 100 [IU]/ML
1-10 INJECTION, SOLUTION INTRAVENOUS; SUBCUTANEOUS
Status: DISCONTINUED | OUTPATIENT
Start: 2022-02-20 | End: 2022-03-02

## 2022-02-20 RX ORDER — IBUPROFEN 200 MG
24 TABLET ORAL
Status: DISCONTINUED | OUTPATIENT
Start: 2022-02-20 | End: 2022-03-10 | Stop reason: HOSPADM

## 2022-02-20 RX ADMIN — ASPIRIN 81 MG: 81 TABLET, COATED ORAL at 08:02

## 2022-02-20 RX ADMIN — ACETAMINOPHEN 1000 MG: 500 TABLET ORAL at 02:02

## 2022-02-20 RX ADMIN — POTASSIUM CHLORIDE 40 MEQ: 7.46 INJECTION, SOLUTION INTRAVENOUS at 06:02

## 2022-02-20 RX ADMIN — SODIUM CHLORIDE, SODIUM LACTATE, POTASSIUM CHLORIDE, AND CALCIUM CHLORIDE: .6; .31; .03; .02 INJECTION, SOLUTION INTRAVENOUS at 06:02

## 2022-02-20 RX ADMIN — ALBUTEROL SULFATE 2 PUFF: 108 INHALANT RESPIRATORY (INHALATION) at 12:02

## 2022-02-20 RX ADMIN — LACOSAMIDE 100 MG: 100 TABLET, FILM COATED ORAL at 08:02

## 2022-02-20 RX ADMIN — ALBUTEROL SULFATE 2 PUFF: 108 INHALANT RESPIRATORY (INHALATION) at 07:02

## 2022-02-20 RX ADMIN — PANTOPRAZOLE SODIUM 40 MG: 40 TABLET, DELAYED RELEASE ORAL at 08:02

## 2022-02-20 RX ADMIN — INSULIN DETEMIR 10 UNITS: 100 INJECTION, SOLUTION SUBCUTANEOUS at 10:02

## 2022-02-20 RX ADMIN — MUPIROCIN: 20 OINTMENT TOPICAL at 08:02

## 2022-02-20 RX ADMIN — POTASSIUM BICARBONATE 40 MEQ: 391 TABLET, EFFERVESCENT ORAL at 07:02

## 2022-02-20 RX ADMIN — INSULIN HUMAN 52 UNITS/HR: 1 INJECTION, SOLUTION INTRAVENOUS at 07:02

## 2022-02-20 RX ADMIN — INSULIN HUMAN 8 UNITS/HR: 1 INJECTION, SOLUTION INTRAVENOUS at 12:02

## 2022-02-20 RX ADMIN — PIPERACILLIN AND TAZOBACTAM 4.5 G: 4; .5 INJECTION, POWDER, LYOPHILIZED, FOR SOLUTION INTRAVENOUS; PARENTERAL at 05:02

## 2022-02-20 RX ADMIN — REMDESIVIR 200 MG: 100 INJECTION, POWDER, LYOPHILIZED, FOR SOLUTION INTRAVENOUS at 02:02

## 2022-02-20 RX ADMIN — POTASSIUM & SODIUM PHOSPHATES POWDER PACK 280-160-250 MG 2 PACKET: 280-160-250 PACK at 07:02

## 2022-02-20 RX ADMIN — APIXABAN 5 MG: 5 TABLET, FILM COATED ORAL at 08:02

## 2022-02-20 RX ADMIN — ATORVASTATIN CALCIUM 40 MG: 20 TABLET, FILM COATED ORAL at 08:02

## 2022-02-20 RX ADMIN — ACETAMINOPHEN 1000 MG: 500 TABLET ORAL at 05:02

## 2022-02-20 RX ADMIN — OXYCODONE HYDROCHLORIDE AND ACETAMINOPHEN 500 MG: 500 TABLET ORAL at 09:02

## 2022-02-20 RX ADMIN — VANCOMYCIN HYDROCHLORIDE 750 MG: 750 INJECTION, POWDER, LYOPHILIZED, FOR SOLUTION INTRAVENOUS at 04:02

## 2022-02-20 RX ADMIN — ALBUTEROL SULFATE 2 PUFF: 108 INHALANT RESPIRATORY (INHALATION) at 01:02

## 2022-02-20 RX ADMIN — INSULIN HUMAN 15 UNITS/HR: 1 INJECTION, SOLUTION INTRAVENOUS at 02:02

## 2022-02-20 RX ADMIN — DEXTROSE MONOHYDRATE AND SODIUM CHLORIDE: 5; .45 INJECTION, SOLUTION INTRAVENOUS at 07:02

## 2022-02-20 RX ADMIN — DOXYCYCLINE HYCLATE 100 MG: 100 TABLET, COATED ORAL at 08:02

## 2022-02-20 RX ADMIN — INSULIN HUMAN 49 UNITS/HR: 1 INJECTION, SOLUTION INTRAVENOUS at 06:02

## 2022-02-20 RX ADMIN — DEXTROSE MONOHYDRATE AND SODIUM CHLORIDE: 5; .45 INJECTION, SOLUTION INTRAVENOUS at 12:02

## 2022-02-20 RX ADMIN — INSULIN HUMAN 62 UNITS/HR: 1 INJECTION, SOLUTION INTRAVENOUS at 08:02

## 2022-02-20 RX ADMIN — ACETAMINOPHEN 1000 MG: 500 TABLET ORAL at 09:02

## 2022-02-20 RX ADMIN — INSULIN HUMAN 26 UNITS/HR: 1 INJECTION, SOLUTION INTRAVENOUS at 10:02

## 2022-02-20 RX ADMIN — AMIODARONE HYDROCHLORIDE 200 MG: 200 TABLET ORAL at 08:02

## 2022-02-20 RX ADMIN — SODIUM CHLORIDE, SODIUM LACTATE, POTASSIUM CHLORIDE, AND CALCIUM CHLORIDE: .6; .31; .03; .02 INJECTION, SOLUTION INTRAVENOUS at 01:02

## 2022-02-20 RX ADMIN — CLOPIDOGREL 75 MG: 75 TABLET, FILM COATED ORAL at 08:02

## 2022-02-20 RX ADMIN — OXYCODONE HYDROCHLORIDE AND ACETAMINOPHEN 500 MG: 500 TABLET ORAL at 08:02

## 2022-02-20 RX ADMIN — SODIUM CHLORIDE 100 MG: 9 INJECTION, SOLUTION INTRAVENOUS at 02:02

## 2022-02-20 RX ADMIN — MULTIPLE VITAMINS W/ MINERALS TAB 1 TABLET: TAB at 08:02

## 2022-02-20 NOTE — ASSESSMENT & PLAN NOTE
Continue home lancosamide   - vEEG ordered. Can candi if patient's mental status improves with DKA management

## 2022-02-20 NOTE — ASSESSMENT & PLAN NOTE
PFT 04/2021 with findings suggestive of emphysema. Follows up with Pulm clinic in [place]. Last seen in December and was evaluated for pulmonary masses. Deemed to have mild COPD per notes. Not on any maintenance therapy    - Continue albuterol inhaler

## 2022-02-20 NOTE — HPI
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 History was not obtained from patient due to encephalopathy. Patient's daughter at bedside reports the patient was recently in the ED on 2/17 for a mechanical fall after being discharged from rehab the same day. CTH and cervical spine revealed  evolving late subacute infarct involving the right frontal lobe with questionable petechial hemorrhage. Vascular neurology evaluated the patient and cleared him for discharge from without any new interventions. He was also tested positive for COVID-19 and was discharged yesterday from the ED. He subsequently received one dose of sotrovimab infusion for COVID and was in a normal state of health until this morning when his daughter found him lethargic and barely responsive prompting her to bring him to the ED. She reports the patient had no complaints prior to developing his AMS. Of note, the patient was recently admitted to [facility] from 01/25 through 02/13 for AMS concerning for CVA, however he was treated for metabolic encephalopathy 2/2 to DKA/HHS, sepsis and BRENDAN.     Upon arrival to the ED, he was placed on 6L NC, normotensive and tachycardic. Lactic 11.5, WBC 17.8, Glucose 1109, pH 7.17, Co2 15.6 HCO3 <5, AG unable to calculate. sCr 3.1. CXR with intersitial opacities. He received ~4L of IVF, vancomycin, zosyn, initiated on insulin shifted for hyperkalemia and calcium for cardioprotection. ECG without noticeable ischemic changes. CTH without new changes- discussed findings with radiology. Patient was admitted to the MICU for further management.

## 2022-02-20 NOTE — PROGRESS NOTES
Pharmacokinetic Assessment Follow Up: IV Vancomycin    Vancomycin serum concentration assessment(s):    The trough level was drawn correctly and can be used to guide therapy at this time. The measurement is within the desired definitive target range of 10 to 20 mcg/mL.    Vancomycin Regimen Plan:    Will give vancomycin 750 mg x1 today   Redraw vancomycin level with am labs on 2/21     Drug levels (last 3 results):  Recent Labs   Lab Result Units 02/20/22  1315   Vancomycin, Random ug/mL 18.8       Pharmacy will continue to follow and monitor vancomycin.    Please contact pharmacy at extension [phone] for questions regarding this assessment.    Thank you for the consult,   Loree Grover       Patient brief summary:  Kamar Muñoz is a 76 y.o. male initiated on antimicrobial therapy with IV Vancomycin for treatment of sepsis    The patient's current regimen is pulse dosning     Drug Allergies:   Review of patient's allergies indicates:   Allergen Reactions    Iodine      Other reaction(s): swelling  Other reaction(s): Itching  Other reaction(s): Rash       Actual Body Weight:   76.4 kg    Renal Function:   Estimated Creatinine Clearance: 28.6 mL/min (A) (based on SCr of 2.3 mg/dL (H)).,     Dialysis Method (if applicable):  N/A    CBC (last 72 hours):  Recent Labs   Lab Result Units 02/17/22 2248 02/19/22 1932 02/20/22 0411   WBC K/uL 10.06 17.76* 14.82*   Hemoglobin g/dL 12.0* 12.1* 10.3*   Hematocrit % 38.6* 44.5 34.2*   Platelets K/uL 283 380 256   Gran % % 66.1 79.9* 84.5*   Lymph % % 12.5* 11.3* 8.3*   Mono % % 9.5 5.7 5.9   Eosinophil % % 10.6* 0.3 0.1   Basophil % % 1.0 0.7 0.3   Differential Method  Automated Automated Automated       Metabolic Panel (last 72 hours):  Recent Labs   Lab Result Units 02/17/22 2248 02/19/22 1932 02/19/22 2047 02/19/22 2113 02/20/22  0046 02/20/22  0411 02/20/22  0810   Sodium mmol/L 135* 135*  --   --  133* 136  136 137   Potassium mmol/L 4.8 6.4*  --   --  4.8  3.9  3.9 3.7   Chloride mmol/L 100 89*  --   --  95 101  101 103   CO2 mmol/L 26 <5*  --   --  10* 15*  15* 23   Glucose mg/dL 255* 1,109*  --   --  957* 727*  727* 386*   Glucose, UA   --   --  3+*  --   --   --   --    BUN mg/dL 15 37*  --   --  39* 36*  36* 30*   Creatinine mg/dL 1.4 3.1*  --   --  3.0* 3.0*  3.0* 2.3*   Albumin g/dL 2.0* 2.4*  --   --   --  2.0*  --    Total Bilirubin mg/dL 0.4 0.4  --   --   --  0.3  --    Alkaline Phosphatase U/L 115 137*  --   --   --  111  --    AST U/L 22 14  --   --   --  24  --    ALT U/L 10 11  --   --   --  9*  --    Magnesium mg/dL  --   --   --  1.9  --  1.8  --    Phosphorus mg/dL  --   --   --  9.1* 5.2* 2.3* 1.9*       Vancomycin Administrations:  vancomycin given in the last 96 hours                   vancomycin (VANCOCIN) 2,250 mg in dextrose 5 % 500 mL IVPB ()  Restarted 02/19/22 2246     2,250 mg New Bag  2241                Microbiologic Results:  Microbiology Results (last 7 days)     Procedure Component Value Units Date/Time    Blood culture x two cultures. Draw prior to antibiotics. [[id number]] Collected: 02/19/22 1932    Order Status: Completed Specimen: Blood from Peripheral, Hand, Left Updated: 02/20/22 0315     Blood Culture, Routine No Growth to date    Narrative:      Aerobic and anaerobic    Blood culture x two cultures. Draw prior to antibiotics. [[id number]] Collected: 02/19/22 1932    Order Status: Completed Specimen: Blood from Peripheral, Hand, Left Updated: 02/20/22 0315     Blood Culture, Routine No Growth to date    Narrative:      Aerobic and anaerobic    Urine culture [[id number]] Collected: 02/19/22 2047    Order Status: No result Specimen: Urine Updated: 02/19/22 2241    Culture, Respiratory with Gram Stain [[id number]]     Order Status: No result Specimen: Respiratory

## 2022-02-20 NOTE — ASSESSMENT & PLAN NOTE
History of uncontrolled DM. A1c 11. On home insulin SQ and metformin.     - Admitted for DKA.   - Continue insulin drip and transition to SQ insulin once DKA resolves

## 2022-02-20 NOTE — NURSING
Spoke with team 3 about patient insulin demands vs the dka protocol. Orders to leave drip at rate of 52 and recheck in 30 minutes to follow up. Will continue to monitor.Patient is in bed and appears to be sleeping. No acute distress noted at this time.

## 2022-02-20 NOTE — ASSESSMENT & PLAN NOTE
Patient with uncontrolled DM presenting with metabolic encephalopathy in the setting of DKA with coma. Suspect patient developed DKA in the setting of sepsis/ COVID-10   Glucose 1109, pH 7.17, Co2 15.6 HCO3 <5, AG unable to calculate    Plan  - Continue insulin drip and DKA protocol  - Conitnue IVF with LR  - BMP q4 hours and replete K as needed  - NPO for now

## 2022-02-20 NOTE — ASSESSMENT & PLAN NOTE
Patient with Hypoxic Respiratory failure which is Acute.  he is not on home oxygen. Supplemental oxygen was provided and noted-  .   Signs/symptoms of respiratory failure include- lethargy. Contributing diagnoses includes - COVID pneumonia Labs and images were reviewed. Patient Has recent ABG, which has been reviewed. Will treat underlying causes and adjust management of respiratory failure as follows-     - Continue COVID protocol.  - Wean O2 to maintain SpO2 88-92% given hx of COPD.

## 2022-02-20 NOTE — SUBJECTIVE & OBJECTIVE
Past Medical History:   Diagnosis Date    Arthritis     Coronary artery disease     Diabetes mellitus type II     Hyperlipidemia     Hypertension     Kidney stone     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 2012    STEMI involving right coronary artery 2022    Type II or unspecified type diabetes mellitus with neurological manifestations, uncontrolled(250.62) 3/8/2013    Urinary tract infection        Past Surgical History:   Procedure Laterality Date    BACK SURGERY      CATARACT EXTRACTION W/  INTRAOCULAR LENS IMPLANT Right     Per Dr Romero note 2018    COLONOSCOPY N/A 2019    Procedure: COLONOSCOPY Suprep;  Surgeon: Anh Johnson MD;  Location: [facility] ENDO;  Service: Endoscopy;  Laterality: N/A;    EYE SURGERY      HERNIA REPAIR      LEFT HEART CATHETERIZATION Left 2022    Procedure: CATHETERIZATION, HEART, LEFT;  Surgeon: Will Hurst III, MD;  Location: [facility] CATH LAB/EP;  Service: Cardiology;  Laterality: Left;    renal stones      SHOULDER OPEN ROTATOR CUFF REPAIR         Review of patient's allergies indicates:   Allergen Reactions    Iodine      Other reaction(s): swelling  Other reaction(s): Itching  Other reaction(s): Rash       Family History    None       Tobacco Use    Smoking status: Former Smoker     Packs/day: 1.50     Years: 25.00     Pack years: 37.50     Quit date: 1983     Years since quittin.1    Smokeless tobacco: Never Used   Substance and Sexual Activity    Alcohol use: No    Drug use: No    Sexual activity: Yes     Partners: Female      Review of Systems   Unable to perform ROS: Mental status change   Objective:     Vital Signs (Most Recent):  Temp: 97 °F (36.1 °C) (22)  Pulse: (!) 116 (22)  Resp: (!) 24 (22)  BP: (!) 114/55 (22)  SpO2: 96 % (22)   Vital Signs (24h Range):  Temp:  [97 °F (36.1 °C)-97.8 °F (36.6 °C)] 97 °F (36.1 °C)  Pulse:  [110-128] 116  Resp:  [22-29] 24  SpO2:  [94 %-98 %] 96 %  BP:  (100-193)/(54-91) 114/55   Weight: 94.8 kg (209 lb)  Body mass index is 26.83 kg/m².      Intake/Output Summary (Last 24 hours) at 2/19/2022 2330  Last data filed at 2/19/2022 2242  Gross per 24 hour   Intake 3044.53 ml   Output --   Net 3044.53 ml       Physical Exam  Vitals and nursing note reviewed.   Constitutional:       General: He is not in acute distress.     Appearance: He is ill-appearing. He is not toxic-appearing or diaphoretic.      Comments: Somnolent, unable to follow commands.    HENT:      Head: Normocephalic and atraumatic.      Nose: Nose normal.      Mouth/Throat:      Mouth: Mucous membranes are dry.   Eyes:      General:         Right eye: No discharge.         Left eye: No discharge.      Conjunctiva/sclera: Conjunctivae normal.      Pupils: Pupils are equal, round, and reactive to light.   Cardiovascular:      Rate and Rhythm: Regular rhythm. Tachycardia present.      Pulses: Normal pulses.      Heart sounds: Normal heart sounds. No murmur heard.  Pulmonary:      Effort: Pulmonary effort is normal. No respiratory distress.      Breath sounds: Normal breath sounds. No wheezing, rhonchi or rales.   Abdominal:      General: Abdomen is flat. Bowel sounds are normal. There is no distension.      Palpations: Abdomen is soft.      Tenderness: There is no abdominal tenderness.   Musculoskeletal:         General: No swelling or tenderness. Normal range of motion.      Cervical back: Normal range of motion.   Skin:     General: Skin is warm.      Comments: Sacral decubitus wound without purulent drainage or crepitus.    Neurological:      Comments: Somnolent, not following commands. Moving all extremities purposefully   Psychiatric:      Comments: Unable to assess       Vents:     Lines/Drains/Airways       Peripheral Intravenous Line  Duration                  Peripheral IV - Single Lumen 02/19/22 1905 18 G Left Antecubital <1 day         Peripheral IV - Single Lumen 02/19/22 2158 18 G Right  Antecubital <1 day                  Significant Labs:    CBC/Anemia Profile:  Recent Labs   Lab 02/19/22 1932   WBC 17.76*   HGB 12.1*   HCT 44.5      *   RDW 14.0        Chemistries:  Recent Labs   Lab 02/19/22 1932 02/19/22 2113   *  --    K 6.4*  --    CL 89*  --    CO2 <5*  --    BUN 37*  --    CREATININE 3.1*  --    CALCIUM 9.6  --    ALBUMIN 2.4*  --    PROT 7.2  --    BILITOT 0.4  --    ALKPHOS 137*  --    ALT 11  --    AST 14  --    MG  --  1.9   PHOS  --  9.1*       Blood Culture: No results for input(s): LABBLOO in the last 48 hours.  BMP:   Recent Labs   Lab 02/19/22 1932 02/19/22 2113   GLU 1,109*  --    *  --    K 6.4*  --    CL 89*  --    CO2 <5*  --    BUN 37*  --    CREATININE 3.1*  --    CALCIUM 9.6  --    MG  --  1.9     CMP:   Recent Labs   Lab 02/19/22 1932   *   K 6.4*   CL 89*   CO2 <5*   GLU 1,109*   BUN 37*   CREATININE 3.1*   CALCIUM 9.6   PROT 7.2   ALBUMIN 2.4*   BILITOT 0.4   ALKPHOS 137*   AST 14   ALT 11   ANIONGAP Unable to calculate   EGFRNONAA 18.3*     Cardiac Markers: No results for input(s): CKMB, TROPONINT, MYOGLOBIN in the last 48 hours.  Coagulation:   Recent Labs   Lab 02/19/22 2242   INR 1.2   APTT 30.6     Lactic Acid:   Recent Labs   Lab 02/19/22 1932   LACTATE 11.5*     Troponin:   Recent Labs   Lab 02/19/22 2113   TROPONINI 0.037*     Urine Culture: No results for input(s): LABURIN in the last 48 hours.  Urine Studies:   Recent Labs   Lab 02/19/22 2047   COLORU Yellow   APPEARANCEUA Hazy*   PHUR 5.0   SPECGRAV 1.020   PROTEINUA Negative   GLUCUA 3+*   KETONESU 1+*   BILIRUBINUA Negative   OCCULTUA 3+*   NITRITE Negative   LEUKOCYTESUR Negative   RBCUA >100*   WBCUA 31*   BACTERIA None   SQUAMEPITHEL 0       Significant Imaging: I have reviewed all pertinent imaging results/findings within the past 24 hours.  Imaging Results              X-Ray Chest AP Portable (Final result)  Result time 02/19/22 20:32:55      Final result by  Azeem Cao MD (02/19/22 20:32:55)                   Impression:      Diffuse interstitial opacities.      Electronically signed by: Azeem Cao MD  Date:    02/19/2022  Time:    20:32               Narrative:    EXAMINATION:  XR CHEST AP PORTABLE    CLINICAL HISTORY:  Sepsis;    TECHNIQUE:  Single frontal view of the chest was performed.    COMPARISON:  01/25/2022.    FINDINGS:  Monitoring EKG leads are present.  There are postoperative changes in the base of the neck.    The trachea is unremarkable.  The cardiomediastinal silhouette is enlarged.  There is no evidence of free air beneath the hemidiaphragms there are no pleural effusions there is no evidence of a pneumothorax.  There is no evidence of pneumomediastinum.  There are diffuse interstitial opacities.  The osseous structures demonstrate degenerative changes.                                       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      Electronically signed by: Carli Coulter  Date:    02/19/2022  Time:    20:21               Narrative:    EXAMINATION:  CT OF THE HEAD WITHOUT    CLINICAL HISTORY:  Mental status change, unknown cause;Recent subacute infarction with possible area of punctate hemorrhage, now confused;    TECHNIQUE:  5 mm unenhanced axial images were obtained from the skull base to the vertex.    COMPARISON:  02/17/2022    FINDINGS:  Stable cerebral atrophy and chronic small vessel ischemic changes are present.  There are areas of infarct involving the right frontal and left cerebellar hemisphere.  There has been no hemorrhagic conversion.  Mild right frontal laminar necrosis is seen.  There is no acute intracranial hemorrhage, territorial infarct or mass effect, or midline shift. In the visualized paranasal sinuses, there  is mild left maxillary sinus mucoperiosteal thickening.

## 2022-02-20 NOTE — PT/OT/SLP EVAL
Physical Therapy Evaluation  Co-Eval with OT    Patient Name:  Kamar Muñoz   MRN:  [id number]    Co-treatment performed due to patient's multiple deficits requiring two skilled therapists to appropriately and safely assess patient's strength and endurance while facilitating functional tasks in addition to accommodating for patient's activity tolerance.   Recommendations:     Discharge Recommendations:  rehabilitation facility   Discharge Equipment Recommendations: none   Barriers to discharge: Increased skilled assistance needed    Assessment:     Kamar Muñoz is a 76 y.o. male admitted with a medical diagnosis of Encephalopathy, metabolic.  He presents with the following impairments/functional limitations:  weakness, impaired endurance, impaired self care skills, impaired functional mobilty, gait instability, impaired balance, impaired cognition, decreased coordination, decreased lower extremity function, decreased safety awareness, decreased upper extremity function, impaired fine motor . Patient tolerated session fairly well this. Patient displays confusion although AAOx3 - not situation. Patient would be at an increased risk of falls should he d/c home.   Patient will benefit from PT services to address the mentioned deficits in order to promote an improve functional mobility status. Upon d/c, rec of [facility] for Kamar Muñoz in order to progress towards an improved level of functional mobility independence.     Rehab Prognosis: Good; patient would benefit from acute skilled PT services to address these deficits and reach maximum level of function.    Recent Surgery: * No surgery found *      Plan:     During this hospitalization, patient to be seen 4 x/week to address the identified rehab impairments via gait training, therapeutic activities, therapeutic exercises, neuromuscular re-education and progress toward the following goals:    · Plan of Care Expires:  03/22/22    History:     Past Medical  "History:   Diagnosis Date    Arthritis     Coronary artery disease     Diabetes mellitus type II     Hyperlipidemia     Hypertension     Kidney stone     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 8/2/2012    STEMI involving right coronary artery 1/9/2022    Type II or unspecified type diabetes mellitus with neurological manifestations, uncontrolled(250.62) 3/8/2013    Urinary tract infection        Past Surgical History:   Procedure Laterality Date    BACK SURGERY      CATARACT EXTRACTION W/  INTRAOCULAR LENS IMPLANT Right     Per Dr Romero note 11/2018    COLONOSCOPY N/A 1/28/2019    Procedure: COLONOSCOPY Suprep;  Surgeon: Anh Johnson MD;  Location: [facility] ENDO;  Service: Endoscopy;  Laterality: N/A;    EYE SURGERY      HERNIA REPAIR      LEFT HEART CATHETERIZATION Left 1/9/2022    Procedure: CATHETERIZATION, HEART, LEFT;  Surgeon: Will Hurst III, MD;  Location: [facility] CATH LAB/EP;  Service: Cardiology;  Laterality: Left;    renal stones      SHOULDER OPEN ROTATOR CUFF REPAIR         Subjective     Chief Complaint: none   Patient/Family Comments/goals: "My wife was supposed to be downstairs last night, but she wasn't."  Pain/Comfort:  · Pain Rating 1: 0/10  · Pain Rating Post-Intervention 1: 0/10    Patients cultural, spiritual, Temple conflicts given the current situation: no    Living Environment:  Patient's living environment is as follows: Per chart review - patient recently d/c'd from [facility] - only home for a few days prior to fall   Home type Home    1 or 2 stories  [facility]   Number of CORINNA/ rails 0 CORINNA   AD used?/Owned?  Built in shower seat, scooter, QC, rollator, BSC, w/c   Bathroom set-up  WIS   Working? no   Driving? Unknown    Individuals living with patient:  Wife    Hobbies/Roles: None stated    Prior to admission, patients level of function was prior to recent hospital admit - patient (I) for ADLs and mobility. Reports while at [facility] - ambulating short distances with RW. Patient " reports at least 1 recent fall. Equipment used at home: cane, quad, rollator, wheelchair, bedside commode (scooter, built in shower chair).  DME owned (not currently used): none.  Upon discharge, patient will have assistance from family - unsure of amount.    Objective:     Communicated with RN prior to session.  Patient found right sidelying with telemetry, peripheral IV, pulse ox (continuous), blood pressure cuff, bed alarm, lopez catheter  upon PT entry to room. See below for detailed functional assessment. Patient agreeable to participate in initial evaluation.    General Precautions: Standard, airborne, droplet, fall, seizure, contact   Orthopedic Precautions:N/A   Braces: N/A     Vitals - VSS throughout    Exams:  · Cognitive Exam:  Patient is oriented to Person, Place and Time  · Patient is follows 100% of one -step commands; though delayed and increased time needed   · Fine Motor Coordination:  Test:  Intact Impaired  Comments    Rapid ankle DF/PF  X     · Postural Exam:  Patient presented with the following abnormalities:    · -       Rounded shoulders  · -       Forward head  · Sensation:    LEFT LE: -       Intact  light/touch LE RIGHT LE:-       Intact  light/touch LE      ROM and Strength   Right Lower Extremity  Left Lower Extremity    Hip Flexion (Iliopsoas)  WFL 3/5   Knee Extension (Quadriceps) WFL 3/5   Knee Flexion (Hamstrings) WFL WFL   Ankle DF (Ant. Tib) WFL 3+/5   Ankle PF (Gastrocnemius)  WFL 3+/5     Functional Mobility:  · Bed Mobility:     · Scooting: moderate assistance  · Supine to Sit: maximal assistance and of 2 persons  · Sit to Supine: minimum assistance  · Transfers:     · Sit to Stand:  moderate assistance with hand-held assist   · Able to achieve 75% of full stand, wide NICOLE, unsteady, posterior lean   · Reported dizziness   · Gait: unable to take steps this date     Balance   Static Sitting SBA-CGA      Slouched posture, posterior pelvic tilt, reports dizziness while EOB     Dynamic Sitting CGA   Static Standing ModA   Dynamic Standing       NT     Therapeutic Activities and Education:  -Patient educated on the role and goal of PT services during acute care LOS. Question and concerns acknowledged and answered as appropriate.   -Will continue to educated as needed.      - Educated on the importance of OOB mobility within safe range in order to decreased adverse effects of prolonged bedrest.     -White board updated in patients room to reflect level of assistance needed with nursing.       - Patient is not yet safe for OOB mobility with RN.     AM-PAC 6 CLICK MOBILITY  Total Score:13     Patient left right sidelying with all lines intact, call button in reach, bed alarm on and RN notified.  White board updated in patient room to reflect level of function with nursing.     GOALS:   Multidisciplinary Problems     Physical Therapy Goals        Problem: Physical Therapy Goal    Goal Priority Disciplines Outcome Goal Variances Interventions   Physical Therapy Goal     PT, PT/OT Ongoing, Progressing     Description: Goals to be met by: 3/10/22     Patient will increase functional independence with mobility by performin. Supine to sit with MInimal Assistance  2. Sit to supine with MInimal Assistance  3. Sit to stand transfer with Minimal Assistance  4. Gait  x 25 feet with Minimal Assistance using LRAD, if needed.   5. Sitting at edge of bed x10 minutes with Modified [place]  6. Stand for 3 minutes with Minimal Assistance using LRAD, if needed  7. Lower extremity exercise program x10 reps per handout, with independence                     Time Tracking:     PT Received On: 22  PT Start Time: 929     PT Stop Time: 947  PT Total Time (min): 18 min     Billable Minutes: Evaluation 10 and Neuromuscular Re-education 8      Deloris Galaviz, PT  2022

## 2022-02-20 NOTE — ASSESSMENT & PLAN NOTE
Started on broad spectrum abx at admission with vanc/zosyn/doxy.    - vancomycin discontinued today as no MRSA has isolated.  No infectious source found but leukocytosis persists.  Consider de-escalation further based on continued improvement.  Day 3 abx today.

## 2022-02-20 NOTE — ASSESSMENT & PLAN NOTE
Hx of CAD s/p placement of 2 LAUREN in RCA in 01/09/2022.     - Continue home ASA, plavix and statin

## 2022-02-20 NOTE — PLAN OF CARE
Problem: Physical Therapy Goal  Goal: Physical Therapy Goal  Description: Goals to be met by: 3/10/22     Patient will increase functional independence with mobility by performin. Supine to sit with MInimal Assistance  2. Sit to supine with MInimal Assistance  3. Sit to stand transfer with Minimal Assistance  4. Gait  x 25 feet with Minimal Assistance using LRAD, if needed.   5. Sitting at edge of bed x10 minutes with Modified [place]  6. Stand for 3 minutes with Minimal Assistance using LRAD, if needed  7. Lower extremity exercise program x10 reps per handout, with independence    Outcome: Ongoing, Progressing     Initial evaluation completed. Patient tolerated assessment well. Established POC and goals. Patient would continue to benefit from skilled PT services in order to improve functional mobility independence.     Deloris Galaviz, PT, DPT  2022

## 2022-02-20 NOTE — ED PROVIDER NOTES
Encounter Date: 2/19/2022       History     Chief Complaint   Patient presents with    Vomiting     Vomiting and altered mental status all day. COVID+     76-year-old male with DM, HLD, HTN, coronary artery disease, current COVID infection, history of subacute stroke presents for altered mental status and vomiting that started today.  Patient was recently seen in the ED and found to have a subacute stroke.  Patient was started on anticoagulation and discharged per stroke recommendations.  Patient has had some vomiting and has been not acting like his normal self.  Patient is normally fully oriented at baseline and has been unable to answer questions all day today.  Patient was found to be COVID positive 2 days ago but had not complain of any pulmonary symptoms.  Patient's daughter reports that he had been acting like his normal self yesterday but upon waking this morning he was confused    The history is provided by the patient and medical records. The history is limited by the condition of the patient.     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 8/2/2012    STEMI involving right coronary artery 1/9/2022    Type II or unspecified type diabetes mellitus with neurological manifestations, uncontrolled(250.62) 3/8/2013    Urinary tract infection      Past Surgical History:   Procedure Laterality Date    BACK SURGERY      CATARACT EXTRACTION W/  INTRAOCULAR LENS IMPLANT Right     Per Dr Romero note 11/2018    COLONOSCOPY N/A 1/28/2019    Procedure: COLONOSCOPY Suprep;  Surgeon: Anh Johnson MD;  Location: [facility];  Service: Endoscopy;  Laterality: N/A;    EYE SURGERY       HERNIA REPAIR      LEFT HEART CATHETERIZATION Left 2022    Procedure: CATHETERIZATION, HEART, LEFT;  Surgeon: Will Hurst III, MD;  Location: [facility] CATH LAB/EP;  Service: Cardiology;  Laterality: Left;    renal stones      SHOULDER OPEN ROTATOR CUFF REPAIR       Family History   Problem Relation Age of Onset    Prostate cancer Neg Hx     Kidney disease Neg Hx      Social History     Tobacco Use    Smoking status: Former Smoker     Packs/day: 1.50     Years: 25.00     Pack years: 37.50     Quit date: 1983     Years since quittin.1    Smokeless tobacco: Never Used   Substance Use Topics    Alcohol use: No    Drug use: No     Review of Systems   Unable to perform ROS: Patient nonverbal       Physical Exam     Initial Vitals   BP Pulse Resp Temp SpO2   22   101/68 (!) 128 (!) 24 97.8 °F (36.6 °C) (!) 94 %      MAP       --                Physical Exam    Nursing note and vitals reviewed.  Constitutional:   Encephalopathic, ill-appearing, following basic commands   HENT:   Head: Normocephalic and atraumatic.   Oropharynx dry   Eyes: Conjunctivae are normal. No scleral icterus.   Neck: Neck supple.   Cardiovascular: Regular rhythm, normal heart sounds and intact distal pulses.   Tachycardic   Pulmonary/Chest: No stridor.   Tachypneic, lung sounds clear bilaterally   Abdominal: Abdomen is soft. He exhibits no distension. There is no abdominal tenderness.   Musculoskeletal:         General: No tenderness or edema.      Cervical back: Neck supple.     Neurological:   Encephalopathic, following basic commands, moving all extremities   Skin: Skin is warm and dry. No rash noted.   Psychiatric:   Confused, following basic commands, nonverbal         ED Course   Critical Care    Date/Time: 2022 10:00 PM  Performed by: Enma Cheng MD  Authorized by: Enma Cheng MD   Direct patient critical care time: 10  minutes  Additional history critical care time: 10 minutes  Ordering / reviewing critical care time: 10 minutes  Documentation critical care time: 10 minutes  Consulting other physicians critical care time: 5 minutes  Total critical care time (exclusive of procedural time) : 45 minutes  Critical care time was exclusive of teaching time and separately billable procedures and treating other patients.  Critical care was necessary to treat or prevent imminent or life-threatening deterioration of the following conditions: endocrine crisis and metabolic crisis.  Critical care was time spent personally by me on the following activities: development of treatment plan with patient or surrogate, discussions with consultants, interpretation of cardiac output measurements, evaluation of patient's response to treatment, examination of patient, obtaining history from patient or surrogate, ordering and review of laboratory studies, ordering and performing treatments and interventions, ordering and review of radiographic studies, pulse oximetry, re-evaluation of patient's condition and review of old charts.        Labs Reviewed   CBC W/ AUTO DIFFERENTIAL - Abnormal; Notable for the following components:       Result Value    WBC 17.76 (*)     RBC 4.36 (*)     Hemoglobin 12.1 (*)      (*)     MCHC 27.2 (*)     Immature Granulocytes 2.1 (*)     Gran # (ANC) 14.2 (*)     Immature Grans (Abs) 0.37 (*)     Gran % 79.9 (*)     Lymph % 11.3 (*)     All other components within normal limits   COMPREHENSIVE METABOLIC PANEL - Abnormal; Notable for the following components:    Sodium 135 (*)     Potassium 6.4 (*)     Chloride 89 (*)     CO2 <5 (*)     Glucose 1,109 (*)     BUN 37 (*)     Creatinine 3.1 (*)     Albumin 2.4 (*)     Alkaline Phosphatase 137 (*)     eGFR if  21.1 (*)     eGFR if non  18.3 (*)     All other components within normal limits   LACTIC ACID, PLASMA - Abnormal; Notable for the  following components:    Lactate (Lactic Acid) 11.5 (*)     All other components within normal limits   BETA - HYDROXYBUTYRATE, SERUM - Abnormal; Notable for the following components:    Beta-Hydroxybutyrate 5.3 (*)     All other components within normal limits   ISTAT PROCEDURE - Abnormal; Notable for the following components:    POC PH 7.174 (*)     POC PCO2 15.6 (*)     POC PO2 127 (*)     POC HCO3 5.8 (*)     POC TCO2 6 (*)     All other components within normal limits   POCT GLUCOSE - Abnormal; Notable for the following components:    POCT Glucose >500 (*)     All other components within normal limits   CULTURE, BLOOD   CULTURE, BLOOD   CULTURE, RESPIRATORY   BETA - HYDROXYBUTYRATE, SERUM   TROPONIN I   URINALYSIS, REFLEX TO URINE CULTURE   PHOSPHORUS   MAGNESIUM   PROTIME-INR   APTT   SEDIMENTATION RATE   CK   LACTATE DEHYDROGENASE   FERRITIN   D DIMER, QUANTITATIVE   B-TYPE NATRIURETIC PEPTIDE   PROCALCITONIN   URINALYSIS, REFLEX TO URINE CULTURE   TROPONIN I   POCT GLUCOSE MONITORING CONTINUOUS     EKG Readings: (Independently Interpreted)   Atrial fibrillation, irregular, narrow, rate of 118, atrial fibrillation has replaced sinus rhythm of transfer text lactate of over 10       Imaging Results          X-Ray Chest AP Portable (Final result)  Result time 02/19/22 20:32:55    Final result by Azeem Cao MD (02/19/22 20:32:55)                 Impression:      Diffuse interstitial opacities.      Electronically signed by: Azeem Cao MD  Date:    02/19/2022  Time:    20:32             Narrative:    EXAMINATION:  XR CHEST AP PORTABLE    CLINICAL HISTORY:  Sepsis;    TECHNIQUE:  Single frontal view of the chest was performed.    COMPARISON:  01/25/2022.    FINDINGS:  Monitoring EKG leads are present.  There are postoperative changes in the base of the neck.    The trachea is unremarkable.  The cardiomediastinal silhouette is enlarged.  There is no evidence of free air beneath the hemidiaphragms there are no  pleural effusions there is no evidence of a pneumothorax.  There is no evidence of pneumomediastinum.  There are diffuse interstitial opacities.  The osseous structures demonstrate degenerative changes.                               CT Head Without Contrast (Final result)  Result time 02/19/22 20:21:19    Final result by Carli Coulter MD (02/19/22 20:21:19)                 Impression:      Acute to subacute infarcts involving the right frontal and left cerebellar hemisphere.      Electronically signed by: Carli Coulter  Date:    02/19/2022  Time:    20:21             Narrative:    EXAMINATION:  CT OF THE HEAD WITHOUT    CLINICAL HISTORY:  Mental status change, unknown cause;Recent subacute infarction with possible area of punctate hemorrhage, now confused;    TECHNIQUE:  5 mm unenhanced axial images were obtained from the skull base to the vertex.    COMPARISON:  02/17/2022    FINDINGS:  Stable cerebral atrophy and chronic small vessel ischemic changes are present.  There are areas of infarct involving the right frontal and left cerebellar hemisphere.  There has been no hemorrhagic conversion.  Mild right frontal laminar necrosis is seen.  There is no acute intracranial hemorrhage, territorial infarct or mass effect, or midline shift. In the visualized paranasal sinuses, there is mild left maxillary sinus mucoperiosteal thickening.                                 Medications   vancomycin (VANCOCIN) 2,250 mg in dextrose 5 % 500 mL IVPB (has no administration in time range)   sodium chloride 0.9% bolus 1,000 mL (1,000 mLs Intravenous New Bag 2/19/22 2112)   dextrose 10% bolus 250 mL (has no administration in time range)   dextrose 10% bolus 125 mL (has no administration in time range)   insulin regular in 0.9 % NaCl 100 unit/100 mL (1 unit/mL) infusion (4 Units/hr Intravenous New Bag 2/19/22 2132)   calcium gluconate 1g in dextrose 5% 100mL (ready to mix system) (has no administration in time range)   lactated  ringers bolus 1,000 mL (1,000 mLs Intravenous New Bag 2/19/22 2157)   sodium chloride 0.9% flush 10 mL (has no administration in time range)   vancomycin - pharmacy to dose (has no administration in time range)   cefepime in dextrose 5 % IVPB 2 g (has no administration in time range)   sodium chloride 0.9% flush 10 mL (has no administration in time range)   remdesivir 200 mg in sodium chloride 0.9% 250 mL infusion (has no administration in time range)   remdesivir 100 mg in sodium chloride 0.9% 250 mL infusion (has no administration in time range)   glucose chewable tablet 16 g (has no administration in time range)   glucose chewable tablet 24 g (has no administration in time range)   dextrose 50% injection 12.5 g (has no administration in time range)   dextrose 50% injection 25 g (has no administration in time range)   glucagon (human recombinant) injection 1 mg (has no administration in time range)   albuterol inhaler 2 puff (has no administration in time range)   ascorbic acid (vitamin C) tablet 500 mg (has no administration in time range)   multivitamin tablet (has no administration in time range)   dexAMETHasone tablet 6 mg (has no administration in time range)   amiodarone tablet 200 mg (has no administration in time range)   apixaban tablet 5 mg (has no administration in time range)   aspirin EC tablet 81 mg (has no administration in time range)   atorvastatin tablet 40 mg (has no administration in time range)   clopidogreL tablet 75 mg (has no administration in time range)   lacosamide tablet 100 mg (has no administration in time range)   pantoprazole EC tablet 40 mg (has no administration in time range)   doxycycline tablet 100 mg (has no administration in time range)   lactated ringers bolus 2,844 mL (0 mL/kg × 94.8 kg Intravenous Stopped 2/19/22 2032)   piperacillin-tazobactam 4.5 g in sodium chloride 0.9% 100 mL IVPB (ready to mix system) (0 g Intravenous Stopped 2/19/22 2113)   calcium gluconate 1g in  "dextrose 5% 100mL (ready to mix system) (1 g Intravenous New Bag 2/19/22 2056)   insulin regular bolus from bag/infusion 9.48 Units (9.48 Units Intravenous Bolus from Bag 2/19/22 2130)     Medical Decision Making:   History:   I obtained history from: someone other than patient and EMS provider.  Old Medical Records: I decided to obtain old medical records.  Old Records Summarized: records from clinic visits and records from previous admission(s).  Initial Assessment:   76-year-old male with DM, HLD, HTN, coronary artery disease, current COVID infection, history of subacute stroke presents for altered mental status and vomiting  Independently Interpreted Test(s):   I have ordered and independently interpreted EKG Reading(s) - see prior notes  Clinical Tests:   Lab Tests: Ordered and Reviewed  Radiological Study: Ordered and Reviewed  Medical Tests: Ordered and Reviewed  Sepsis Perfusion Assessment: "I attest a sepsis perfusion exam was performed within 6 hours of sepsis, severe sepsis, or septic shock presentation, following fluid resuscitation."    Sepsis Perfusion Assessment Complete: 2/19/2022 10:03 PM    ED Management:  Patient encephalopathic on arrival, ill-appearing, tachycardic and hypotensive  Blood glucose greater than 500 on initial point of care, concerning for DKA versus HHS  Triggering event suspected to be sepsis versus medication noncompliance  Patient also with some rhythmic shaking activity, unclear if these were seizures as patient also has severe metabolic derangements that may have caused his presentation  Patient with wide complex tachycardia on arrival, found hyperkalemic, calcium gluconate given  Patient tachypneic, likely metabolic as lungs are clear and patient has no hypoxia  Differentials include sepsis, COVID infection, seizures, UTI, poor diet  Workup includes sepsis workup, IV antibiotics,  Interventions include IV fluids, insulin drip, calcium gluconate  Patient will require admission " to critical care    Other:   I have discussed this case with another health care provider.       <> Summary of the Discussion: Discussed with critical care            Attending Attestation:   Physician Attestation Statement for Resident:  As the supervising MD   Physician Attestation Statement: I have personally seen and examined this patient.   I agree with the above history. -:   As the supervising MD I agree with the above PE.    As the supervising MD I agree with the above treatment, course, plan, and disposition.   -: Patient is tachypneic but not hypoxic, altered and appears dehydrated with dry mucous membranes, no lacerations, high concern for metabolic acidosis causing increased respiratory drive.    Patient has history of diabetes, currently acidotic to 7.17 and will initiate DKA treatment and workup.   I have reviewed and agree with the residents interpretation of the following: lab data, x-rays and EKG.  I have reviewed the following: old records at this facility.                ED Course as of 02/19/22 2204   Sat Feb 19, 2022 1956 POC PH(!!): 7.174  Metabolic acidosis [JR]   2020 Potassium(!!): 6.4 [OK]   2020 WBC(!): 17.76 [JR]   2035 Lactate, Avni(!!): 11.5 [OK]      ED Course User Index  [JR] Enma Cheng MD  [OK] Geraldo Baker MD             Clinical Impression:   Final diagnoses:  [R06.02] SOB (shortness of breath)  [I47.2] Wide-complex tachycardia  [E87.2] Metabolic acidosis (Primary)  [R57.9] Shock  [U07.1] COVID-19 virus infection  [E13.11] Diabetic ketoacidosis with coma associated with other specified diabetes mellitus          ED Disposition Condition    Admit               Geraldo Baker MD  Resident  02/19/22 2205       Enma Cheng MD  02/21/22 2040

## 2022-02-20 NOTE — ASSESSMENT & PLAN NOTE
In the setting of DKA and sepsis. GCS of 9. Patient intermittently follows commands per daughter at bedside.   No seizure activity noted, however patient was noted to be 'shaking' per daughter, that she felt was concerning for possible seziure.   CT Head without any concerning new findings- no new infarct as discussed with radiology    Plan  - Given hx of seziures, will order EEG, although very low suspicion for seizures. Can d/c EEG if mental status improves with DKA/HHS management.    - CK levels ordered (although expect to be elevated due to COVID infection)  - Lancosamide levels  - Continue IV lancosamide and swap to po when able to safely swallow.

## 2022-02-20 NOTE — ASSESSMENT & PLAN NOTE
Has stable bilateral  pulmonary masses which could be malignancy per outpatient pulmonology notes. No pathology report as none of the nodules appeared to be safe for biopsy given high risk of PTX is high with many adjacent bulla.

## 2022-02-20 NOTE — ASSESSMENT & PLAN NOTE
History of paroxysmal atrial fibrillation on home  Metoprolol XL 50 mg daily, diltiazem  mg daily and amiodarone 200 mg daily.   [id number] Score: 4. Anticoagulation indicated. Anticoagulation done with apixaban.  Currently in  Sinus tahcycardia     Plan  - Continue po apixaban  - Holding metoprolol and diltiazem in the setting of sepsis  - Continue amiodarone for sinus rhythm maintenance (closely monitor QTc, currently 596)- repeating ECG. If QTc worsens or if patient becomes hypotensive, will hold amiodarone).

## 2022-02-20 NOTE — ASSESSMENT & PLAN NOTE
Hx of CVA in Jan 2022. CT Head with evolving infarcts in right frontal and left cerebellar hemispheres. No concern for acute stroke per discussion with radiology.     - Continue home antiplatelets, statin and eliquis

## 2022-02-20 NOTE — PLAN OF CARE
Problem: Occupational Therapy Goal  Goal: Occupational Therapy Goal  Description: Goals to be met by: 3/6/22     Patient will increase functional independence with ADLs by performing:    Feeding with [place].  UE Dressing with Stand-by Assistance.  LE Dressing with Minimal Assistance.  Grooming while standing with Stand-by Assistance.  Toileting from bedside commode with Stand-by Assistance for hygiene and clothing management.   Toilet transfer to bedside commode with Stand-by Assistance.    Outcome: Ongoing, Progressing     Evaluation complete-see note for details. Goals and POC established.    KODAK Anthony/L  2/20/2022   Pager #: [phone]

## 2022-02-20 NOTE — PLAN OF CARE
CMICU DAILY GOALS       A: Awake    RASS: Goal -    Actual -     Restraint necessity:    B: Breathe   SBT: Not intubated   C: Coordinate A & B, analgesics/sedatives   Pain: managed    SAT: Not intubated  D: Delirium   CAM-ICU:    E: Early(intubated/ Progressive (non-intubated) Mobility   MOVE Screen: Fail   Activity: Activity Management: Rolling - L1  FAS: Feeding/Nutrition   Diet order: Diet/Nutrition Received: NPO,    T: Thrombus   DVT prophylaxis: VTE Required Core Measure: Pharmacological prophylaxis initiated/maintained  H: HOB Elevation   Head of Bed (HOB) Positioning: HOB at 30-45 degrees  U: Ulcer Prophylaxis   GI: yes  G: Glucose control   uncontrolled Glycemic Management: blood glucose monitored  S: Skin   Bathing/Skin Care: bath, partial              B: Bowel Function   no issues   I: Indwelling Catheters   Mccord necessity:     CVC necessity: Yes  D: De-escalation Antibiotics   Yes    Family/Goals of care/Code Status   Code Status: Full Code    24H Vital Sign Range  Temp:  [97 °F (36.1 °C)-97.8 °F (36.6 °C)]   Pulse:  []   Resp:  [18-35]   BP: ()/(50-91)   SpO2:  [94 %-98 %]      Shift Events   No acute events throughout shift, pt admitted to room and oriented to room. Titrated insulin per nomogram, gave antibiotics and other infusions.     VS and assessment per flow sheet, patient progressing towards goals as tolerated, plan of care reviewed with Kamar, all concerns addressed, will continue to monitor.    Tray Webb

## 2022-02-20 NOTE — ASSESSMENT & PLAN NOTE
Viral Pneumonia due to COVID-19  - COVID-19 testing:   -Patient is identified as High risk for severe complications of COVID 19 based on COVID risk score of 8   Initiate standard COVID protocols; COVID-19 testing Collection Date: 8/9/2021 Collection Time:   3:41 PM ,Infection Control notification  and isolation- respiratory, contact and droplet per protocol  COVID vaccinated with booster.   Received 1 dose of sotrovimab infusion 02/18/2022    - Diagnostics: Trend LDH, CRP, Ferritin, D-dimer Q48hrs if stable, more frequently if patient decompensating.    Lymphopenia, hyponatremia, hyperferritinemia, elevated troponin, elevated d-dimer, age, and medical comorbidities are significant predictors of poor clinical outcome    - Management: Per [facility] COVID Treatment Protocol (4/15/20)      - Monitoring:   - Telemetry & Continuous Pulse Oximetry      - Targeted therapy Remdesivir, 200mg IV x1, followed by 100mg IV daily x5 days total,  - Holding dexamethasone PO/IV 6mg daily x10 days in the setting of DKA/HHS. Will wean O2 and if truly hypoxic, will start dexamethasone.   - Anticoagulation, Patient admitted to critical care- Will continue apixaban dose anticoagulation      - Antibiotics:  - if indications, CXR findings, elevated procal. See protocol for alternatives.   - Discontinue early if low concern for bacterial co-infection   - Initiated on IV vanc, zosyn and doxycycline (unable to use azithromycin due to prolong QTc)     - Nutrition:    - Multivitamin PO daily   - Add Boost supplement   - Vitamin D 1000IU daily if deficient   - Ascorbic acid 500mg PO bid    - Supportive Care:   - acetaminophen 650mg PO Q6hr PRN fever/headache   - loperamide PRN viral diarrhea      Acute Hypoxemic Respiratory Failure  -- Continuous Pulse Oximetry  - Goal SpO2 92-96%  - Supplemental O2 via LFNC, VentiMask, or HFNC (see Respiratory Support Oxygen Therapies)  - If wheezing, albuterol INH Q6h scheduled & PRN  - Proning Protocol if  patient is a candidate (see  Proning Protocol)   - GCS >13, able to self-prone  - If deterioration, may warrant trial of NIPPV and transfer to neg pressure room or immediate ICU consult    Advance Care Planning  Goals of care, counseling/discussion Ongoing. Patient is full code.   Advance Care Planning

## 2022-02-20 NOTE — ASSESSMENT & PLAN NOTE
Disucssed code status with patient's daughter. She states the patient and her family just started discussing advance directives but there has been no final conclusion. Patient's spouse is currently ill and in the hospital. Daughter wants patient to remains full code and will further address code status and intubation wishes if he were to further decline.

## 2022-02-20 NOTE — H&P
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 History was not obtained from patient due to encephalopathy. Patient's daughter at bedside reports the patient was recently in the ED on 2/17 for a mechanical fall after being discharged from rehab the same day. CTH and cervical spine revealed  evolving late subacute infarct involving the right frontal lobe with questionable petechial hemorrhage. Vascular neurology evaluated the patient and cleared him for discharge from without any new interventions. He was also tested positive for COVID-19 and was discharged yesterday from the ED. He subsequently received one dose of sotrovimab infusion for COVID and was in a normal state of health until this morning when his daughter found him lethargic and barely responsive prompting her to bring him to the ED. She reports the patient had no complaints prior to developing his AMS. Of note, the patient was recently admitted to [facility] [place] [facility] from 01/25 through 02/13 for AMS concerning for CVA, however he was treated for metabolic encephalopathy 2/2 to DKA/HHS, sepsis and BRENDAN.     Upon arrival to the ED, he was placed on 6L NC, normotensive and tachycardic. Lactic 11.5, WBC 17.8, Glucose 1109, pH 7.17, Co2 15.6 HCO3 <5, AG unable to calculate. sCr 3.1. CXR with intersitial opacities. He received ~4L of IVF, vancomycin,  zosyn, initiated on insulin shifted for hyperkalemia and calcium for cardioprotection. ECG without noticeable ischemic changes. CTH without new changes- discussed findings with radiology. Patient was admitted to the MICU for further management.        Hospital/ICU Course:  No notes on file     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 2012    STEMI involving right coronary artery 2022    Type II or unspecified type diabetes mellitus with neurological manifestations, uncontrolled(250.62) 3/8/2013    Urinary tract infection        Past Surgical History:   Procedure Laterality Date    BACK SURGERY      CATARACT EXTRACTION W/  INTRAOCULAR LENS IMPLANT Right     Per Dr Romero note 2018    COLONOSCOPY N/A 2019    Procedure: COLONOSCOPY Suprep;  Surgeon: Ahn Johnson MD;  Location: [facility] ENDO;  Service: Endoscopy;  Laterality: N/A;    EYE SURGERY      HERNIA REPAIR      LEFT HEART CATHETERIZATION Left 2022    Procedure: CATHETERIZATION, HEART, LEFT;  Surgeon: Will Hurst III, MD;  Location: [facility] CATH LAB/EP;  Service: Cardiology;  Laterality: Left;    renal stones      SHOULDER OPEN ROTATOR CUFF REPAIR         Review of patient's allergies indicates:   Allergen Reactions    Iodine      Other reaction(s): swelling  Other reaction(s): Itching  Other reaction(s): Rash       Family History    None       Tobacco Use    Smoking status: Former Smoker     Packs/day: 1.50     Years: 25.00     Pack years: 37.50     Quit date: 1983     Years since quittin.1    Smokeless tobacco: Never Used   Substance and Sexual Activity    Alcohol use: No    Drug use: No    Sexual activity: Yes     Partners: Female      Review of Systems   Unable to perform ROS: Mental status change   Objective:     Vital Signs (Most  Recent):  Temp: 97 °F (36.1 °C) (02/19/22 1933)  Pulse: (!) 116 (02/19/22 2230)  Resp: (!) 24 (02/19/22 2230)  BP: (!) 114/55 (02/19/22 2230)  SpO2: 96 % (02/19/22 2230)   Vital Signs (24h Range):  Temp:  [97 °F (36.1 °C)-97.8 °F (36.6 °C)] 97 °F (36.1 °C)  Pulse:  [110-128] 116  Resp:  [22-29] 24  SpO2:  [94 %-98 %] 96 %  BP: (100-193)/(54-91) 114/55   Weight: 94.8 kg (209 lb)  Body mass index is 26.83 kg/m².      Intake/Output Summary (Last 24 hours) at 2/19/2022 2330  Last data filed at 2/19/2022 2242  Gross per 24 hour   Intake 3044.53 ml   Output --   Net 3044.53 ml       Physical Exam  Vitals and nursing note reviewed.   Constitutional:       General: He is not in acute distress.     Appearance: He is ill-appearing. He is not toxic-appearing or diaphoretic.      Comments: Somnolent, unable to follow commands.    HENT:      Head: Normocephalic and atraumatic.      Nose: Nose normal.      Mouth/Throat:      Mouth: Mucous membranes are dry.   Eyes:      General:         Right eye: No discharge.         Left eye: No discharge.      Conjunctiva/sclera: Conjunctivae normal.      Pupils: Pupils are equal, round, and reactive to light.   Cardiovascular:      Rate and Rhythm: Regular rhythm. Tachycardia present.      Pulses: Normal pulses.      Heart sounds: Normal heart sounds. No murmur heard.  Pulmonary:      Effort: Pulmonary effort is normal. No respiratory distress.      Breath sounds: Normal breath sounds. No wheezing, rhonchi or rales.   Abdominal:      General: Abdomen is flat. Bowel sounds are normal. There is no distension.      Palpations: Abdomen is soft.      Tenderness: There is no abdominal tenderness.   Musculoskeletal:         General: No swelling or tenderness. Normal range of motion.      Cervical back: Normal range of motion.   Skin:     General: Skin is warm.      Comments: Sacral decubitus wound without purulent drainage or crepitus.    Neurological:      Comments: Somnolent, not following  commands. Moving all extremities purposefully   Psychiatric:      Comments: Unable to assess           Vents:     Lines/Drains/Airways       Peripheral Intravenous Line  Duration                  Peripheral IV - Single Lumen 02/19/22 1905 18 G Left Antecubital <1 day         Peripheral IV - Single Lumen 02/19/22 2158 18 G Right Antecubital <1 day                  Significant Labs:    CBC/Anemia Profile:  Recent Labs   Lab 02/19/22 1932   WBC 17.76*   HGB 12.1*   HCT 44.5      *   RDW 14.0        Chemistries:  Recent Labs   Lab 02/19/22 1932 02/19/22 2113   *  --    K 6.4*  --    CL 89*  --    CO2 <5*  --    BUN 37*  --    CREATININE 3.1*  --    CALCIUM 9.6  --    ALBUMIN 2.4*  --    PROT 7.2  --    BILITOT 0.4  --    ALKPHOS 137*  --    ALT 11  --    AST 14  --    MG  --  1.9   PHOS  --  9.1*       Blood Culture: No results for input(s): LABBLOO in the last 48 hours.  BMP:   Recent Labs   Lab 02/19/22 1932 02/19/22 2113   GLU 1,109*  --    *  --    K 6.4*  --    CL 89*  --    CO2 <5*  --    BUN 37*  --    CREATININE 3.1*  --    CALCIUM 9.6  --    MG  --  1.9     CMP:   Recent Labs   Lab 02/19/22 1932   *   K 6.4*   CL 89*   CO2 <5*   GLU 1,109*   BUN 37*   CREATININE 3.1*   CALCIUM 9.6   PROT 7.2   ALBUMIN 2.4*   BILITOT 0.4   ALKPHOS 137*   AST 14   ALT 11   ANIONGAP Unable to calculate   EGFRNONAA 18.3*     Cardiac Markers: No results for input(s): CKMB, TROPONINT, MYOGLOBIN in the last 48 hours.  Coagulation:   Recent Labs   Lab 02/19/22 2242   INR 1.2   APTT 30.6     Lactic Acid:   Recent Labs   Lab 02/19/22 1932   LACTATE 11.5*     Troponin:   Recent Labs   Lab 02/19/22 2113   TROPONINI 0.037*     Urine Culture: No results for input(s): LABURIN in the last 48 hours.  Urine Studies:   Recent Labs   Lab 02/19/22 2047   COLORU Yellow   APPEARANCEUA Hazy*   PHUR 5.0   SPECGRAV 1.020   PROTEINUA Negative   GLUCUA 3+*   KETONESU 1+*   BILIRUBINUA Negative   OCCULTUA 3+*    NITRITE Negative   LEUKOCYTESUR Negative   RBCUA >100*   WBCUA 31*   BACTERIA None   SQUAMEPITHEL 0       Significant Imaging: I have reviewed all pertinent imaging results/findings within the past 24 hours.  Imaging Results              X-Ray Chest AP Portable (Final result)  Result time 02/19/22 20:32:55      Final result by Azeem Cao MD (02/19/22 20:32:55)                   Impression:      Diffuse interstitial opacities.      Electronically signed by: Azeem Cao MD  Date:    02/19/2022  Time:    20:32               Narrative:    EXAMINATION:  XR CHEST AP PORTABLE    CLINICAL HISTORY:  Sepsis;    TECHNIQUE:  Single frontal view of the chest was performed.    COMPARISON:  01/25/2022.    FINDINGS:  Monitoring EKG leads are present.  There are postoperative changes in the base of the neck.    The trachea is unremarkable.  The cardiomediastinal silhouette is enlarged.  There is no evidence of free air beneath the hemidiaphragms there are no pleural effusions there is no evidence of a pneumothorax.  There is no evidence of pneumomediastinum.  There are diffuse interstitial opacities.  The osseous structures demonstrate degenerative changes.                                       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      Electronically signed by: Carli Coulter  Date:    02/19/2022  Time:    20:21               Narrative:    EXAMINATION:  CT OF THE HEAD WITHOUT    CLINICAL HISTORY:  Mental status change, unknown cause;Recent subacute infarction with possible area of punctate hemorrhage, now confused;    TECHNIQUE:  5 mm unenhanced axial images were obtained from the skull base to the vertex.    COMPARISON:  02/17/2022    FINDINGS:  Stable cerebral atrophy and chronic small vessel ischemic changes  are present.  There are areas of infarct involving the right frontal and left cerebellar hemisphere.  There has been no hemorrhagic conversion.  Mild right frontal laminar necrosis is seen.  There is no acute intracranial hemorrhage, territorial infarct or mass effect, or midline shift. In the visualized paranasal sinuses, there is mild left maxillary sinus mucoperiosteal thickening.                                        Assessment/Plan:     Neuro  * Encephalopathy, metabolic  In the setting of DKA and sepsis. GCS of 9. Patient intermittently follows commands per daughter at bedside.   No seizure activity noted, however patient was noted to be 'shaking' per daughter, that she felt was concerning for possible seziure.   CT Head without any concerning new findings- no new infarct as discussed with radiology    Plan  - Given hx of seziures, will order EEG, although very low suspicion for seizures. Can d/c EEG if mental status improves with DKA/HHS management.    - CK levels ordered (although expect to be elevated due to COVID infection)  - Lancosamide levels  - Continue IV lancosamide and swap to po when able to safely swallow.     Focal seizures  Continue home lancosamide   - vEEG ordered. Can candi if patient's mental status improves with DKA management  - Avoid seizure lowering medications     Embolic stroke involving right middle cerebral artery  Hx of CVA in Jan 2022. CT Head with evolving infarcts in right frontal and left cerebellar hemispheres. No concern for acute stroke per discussion with radiology.     - Continue home antiplatelets, statin and eliquis    Pulmonary  Acute hypoxemic respiratory failure  Patient with Hypoxic Respiratory failure which is Acute.  he is not on home oxygen. Supplemental oxygen was provided and noted-  .   Signs/symptoms of respiratory failure include- lethargy. Contributing diagnoses includes - COVID pneumonia Labs and images were reviewed. Patient Has recent ABG, which has been  reviewed. Will treat underlying causes and adjust management of respiratory failure as follows-     - Continue COVID protocol.  - Wean O2 to maintain SpO2 88-92% given hx of COPD.    Pulmonary nodules  Has stable bilateral  pulmonary masses which could be malignancy per outpatient pulmonology notes. No pathology report as none of the nodules appeared to be safe for biopsy given high risk of PTX is high with many adjacent bulla.         Chronic pulmonary heart disease  PFT 04/2021 with findings suggestive of emphysema. Follows up with Pulm clinic in [place]. Last seen in December and was evaluated for pulmonary masses. Deemed to have mild COPD per notes. Not on any maintenance therapy    - Continue albuterol inhaler         Cardiac/Vascular  Paroxysmal atrial fibrillation  History of paroxysmal atrial fibrillation on home  Metoprolol XL 50 mg daily, diltiazem  mg daily and amiodarone 200 mg daily.   [id number] Score: 4. Anticoagulation indicated. Anticoagulation done with apixaban.  Currently in  Sinus tahcycardia     Plan  - Continue po apixaban  - Holding metoprolol and diltiazem in the setting of sepsis  - Continue amiodarone for sinus rhythm maintenance (closely monitor QTc, currently 596)- repeating ECG. If QTc worsens or if patient becomes hypotensive, will hold amiodarone).        Coronary artery disease involving native coronary artery of native heart with unstable angina pectoris  Hx of CAD s/p placement of 2 LAUREN in RCA in 01/09/2022.     - Continue home ASA, plavix and statin    Hypertension associated with diabetes  Holding home antihypertensives in the setting of sepsis    Renal/  BRENDAN (acute kidney injury)  sCr 3.1, baseline 1.1-1.3. Likely prerenal in the setting of DKA    - Continue with hydration  - Continue DKA protocol  - Avoid nephrotoxins, renally dose meds  - BMP q4 hours.     ID  Severe sepsis  This patient does have evidence of infective focus  My overall impression is sepsis. Vital signs  "were reviewed and noted in progress note.  Antibiotics given-   Antibiotics (From admission, onward)            Start     Stop Route Frequency Ordered    02/20/22 0900  mupirocin 2 % ointment         02/25 0859 Nasl 2 times daily 02/19/22 2331    02/20/22 0500  piperacillin-tazobactam 4.5 g in sodium chloride 0.9% 100 mL IVPB (ready to mix system)         -- IV Every 12 hours (non-standard times) 02/19/22 2209 02/19/22 2300  doxycycline tablet 100 mg         -- Oral Every 12 hours 02/19/22 2204 02/19/22 2245  vancomycin - pharmacy to dose  (vancomycin IVPB)        "And" Linked Group Details    -- IV pharmacy to manage frequency 02/19/22 2148        Cultures were taken-   Microbiology Results (last 7 days)     Procedure Component Value Units Date/Time    Blood culture x two cultures. Draw prior to antibiotics. [[id number]] Collected: 02/19/22 1932    Order Status: Completed Specimen: Blood from Peripheral, Hand, Left Updated: 02/20/22 0315     Blood Culture, Routine No Growth to date    Narrative:      Aerobic and anaerobic    Blood culture x two cultures. Draw prior to antibiotics. [[id number]] Collected: 02/19/22 1932    Order Status: Completed Specimen: Blood from Peripheral, Hand, Left Updated: 02/20/22 0315     Blood Culture, Routine No Growth to date    Narrative:      Aerobic and anaerobic    Urine culture [[id number]] Collected: 02/19/22 2047    Order Status: No result Specimen: Urine Updated: 02/19/22 2241    Culture, Respiratory with Gram Stain [[id number]]     Order Status: No result Specimen: Respiratory         Latest lactate reviewed, they are-  Recent Labs   Lab 02/19/22 1932 02/20/22  0046   LACTATE 11.5* 4.9*       Organ dysfunction indicated by Acute kidney injury, Encephalopathy  and Acute respiratory failure  Source- Unclear. CXR with diffuse interstitial opacities, however no consolidation noted. UA with pyuria, without elevated leuks. Blood cultures pending. Possibly due to sacral wound, " although doesn't look grossly infected.     Plan  - Continue IV vancomycin, zosyn (avoided cefepime due to seizure hx) and doxycycline (to cover for atypical respiratory organisms- unable to use azithro due to prolonged QTc)  - F/u Urine and blood cultures  - Wound care consulted for sacral decubitus wound.   - trend lactic acid until improves          Endocrine  Diabetic ketoacidosis with coma associated with type 2 diabetes mellitus  Patient with uncontrolled DM presenting with metabolic encephalopathy in the setting of DKA with coma. Suspect patient developed DKA in the setting of sepsis/ COVID-10   Glucose 1109, pH 7.17, Co2 15.6 HCO3 <5, AG unable to calculate    Plan  - Continue insulin drip and DKA protocol  - Conitnue IVF with LR  - BMP q4 hours and replete K as needed  - NPO for now      Type 2 diabetes mellitus with hyperglycemia, with long-term current use of insulin  History of uncontrolled DM. A1c 11. On home insulin SQ and metformin.     - Admitted for DKA.   - Continue insulin drip and transition to SQ insulin once DKA resolves    Other  Palliative care status  Disucssed code status with patient's daughter. She states the patient and her family just started discussing advance directives but there has been no final conclusion. Patient's spouse is currently ill and in the hospital. Daughter wants patient to remains full code and will further address code status and intubation wishes if he were to further decline.     COVID-19    Viral Pneumonia due to COVID-19  - COVID-19 testing:   -Patient is identified as High risk for severe complications of COVID 19 based on COVID risk score of 8   Initiate standard COVID protocols; COVID-19 testing Collection Date: 8/9/2021 Collection Time:   3:41 PM ,Infection Control notification  and isolation- respiratory, contact and droplet per protocol  COVID vaccinated with booster.   Received 1 dose of sotrovimab infusion 02/18/2022    - Diagnostics: Trend LDH, CRP,  Ferritin, D-dimer Q48hrs if stable, more frequently if patient decompensating.    Lymphopenia, hyponatremia, hyperferritinemia, elevated troponin, elevated d-dimer, age, and medical comorbidities are significant predictors of poor clinical outcome    - Management: Per [facility] COVID Treatment Protocol (4/15/20)      - Monitoring:   - Telemetry & Continuous Pulse Oximetry      - Targeted therapy Remdesivir, 200mg IV x1, followed by 100mg IV daily x5 days total,  - Holding dexamethasone PO/IV 6mg daily x10 days in the setting of DKA/HHS. Will wean O2 and if truly hypoxic, will start dexamethasone.   - Anticoagulation, Patient admitted to critical care- Will continue apixaban dose anticoagulation      - Antibiotics:  - if indications, CXR findings, elevated procal. See protocol for alternatives.   - Discontinue early if low concern for bacterial co-infection   - Initiated on IV vanc, zosyn and doxycycline (unable to use azithromycin due to prolong QTc)     - Nutrition:    - Multivitamin PO daily   - Add Boost supplement   - Vitamin D 1000IU daily if deficient   - Ascorbic acid 500mg PO bid    - Supportive Care:   - acetaminophen 650mg PO Q6hr PRN fever/headache   - loperamide PRN viral diarrhea      Acute Hypoxemic Respiratory Failure  -- Continuous Pulse Oximetry  - Goal SpO2 92-96%  - Supplemental O2 via LFNC, VentiMask, or HFNC (see Respiratory Support Oxygen Therapies)  - If wheezing, albuterol INH Q6h scheduled & PRN  - Proning Protocol if patient is a candidate (see  Proning Protocol)   - GCS >13, able to self-prone  - If deterioration, may warrant trial of NIPPV and transfer to neg pressure room or immediate ICU consult    Advance Care Planning  Goals of care, counseling/discussion Ongoing. Patient is full code.   Advance Care Planning                       Critical Care Daily Checklist:    A: Awake: RASS Goal/Actual Goal:    Actual:     B: Spontaneous Breathing Trial Performed?     C: SAT & SBT Coordinated?   NA                      D: Delirium: CAM-ICU     E: Early Mobility Performed? Yes   F: Feeding Goal:    Status:     Current Diet Order   Procedures    Diet NPO      AS: Analgesia/Sedation NA   T: Thromboembolic Prophylaxis Apixaban   H: HOB > 300 Yes   U: Stress Ulcer Prophylaxis (if needed) protonix   G: Glucose Control Insulin gtt   B: Bowel Function     I: Indwelling Catheter (Lines & Mccord) Necessity Mccord    D: De-escalation of Antimicrobials/Pharmacotherapies IV vancomycin, doycycline, zosyn    Plan for the day/ETD Manage DKA    Code Status:  Family/Goals of Care: Full Code  ongoing       Critical secondary to Patient has a condition that poses threat to life and bodily function: Encephalopathy     Critical care was time spent personally by me on the following activities: development of treatment plan with patient or surrogate and bedside caregivers, discussions with consultants, evaluation of patient's response to treatment, examination of patient, ordering and performing treatments and interventions, ordering and review of laboratory studies, ordering and review of radiographic studies, pulse oximetry, re-evaluation of patient's condition. This critical care time did not overlap with that of any other provider or involve time for any procedures.     Janette Asif MD  Critical Care Medicine  [facility] - Intensive Care ([place] [id number])

## 2022-02-20 NOTE — CONSULTS
Patient seen and evaluated by critical care medicine. Full H&P to follow.     BJORN Fuentes  Pulmonary Critical Care Medicine   02/20/2022

## 2022-02-20 NOTE — PROGRESS NOTES
Pharmacokinetic Initial Assessment & Plan: IV Vancomycin    IV Vancomycin 2250 mg x 1 given in the ED on 02/19 @ 2241.  Subsequent doses based on random levels. Draw a Vancomycin random on 02/20 @ 1200.  Desired empiric serum trough concentration is 10 to 20 mcg/mL    Pharmacy will continue to follow and monitor vancomycin.    [id number] with any questions regarding this assessment.     Thank you for the consult,   Arpan Pitts       Patient brief summary:  Kamar Muñzo is a 76 y.o. male initiated on antimicrobial therapy with IV Vancomycin for treatment of suspected bacteremia    Drug Allergies:   Review of patient's allergies indicates:   Allergen Reactions    Iodine      Other reaction(s): swelling  Other reaction(s): Itching  Other reaction(s): Rash       Actual Body Weight:   94.8 kg    Renal Function:   Estimated Creatinine Clearance: 22.8 mL/min (A) (based on SCr of 3.1 mg/dL (H)).,     Dialysis Method (if applicable):  N/A    CBC (last 72 hours):  Recent Labs   Lab Result Units 02/17/22 2248 02/19/22 1932   WBC K/uL 10.06 17.76*   Hemoglobin g/dL 12.0* 12.1*   Hematocrit % 38.6* 44.5   Platelets K/uL 283 380   Gran % % 66.1 79.9*   Lymph % % 12.5* 11.3*   Mono % % 9.5 5.7   Eosinophil % % 10.6* 0.3   Basophil % % 1.0 0.7   Differential Method  Automated Automated       Metabolic Panel (last 72 hours):  Recent Labs   Lab Result Units 02/17/22 2248 02/19/22 1932 02/19/22 2047 02/19/22 2113   Sodium mmol/L 135* 135*  --   --    Potassium mmol/L 4.8 6.4*  --   --    Chloride mmol/L 100 89*  --   --    CO2 mmol/L 26 <5*  --   --    Glucose mg/dL 255* 1,109*  --   --    Glucose, UA   --   --  3+*  --    BUN mg/dL 15 37*  --   --    Creatinine mg/dL 1.4 3.1*  --   --    Albumin g/dL 2.0* 2.4*  --   --    Total Bilirubin mg/dL 0.4 0.4  --   --    Alkaline Phosphatase U/L 115 137*  --   --    AST U/L 22 14  --   --    ALT U/L 10 11  --   --    Magnesium mg/dL  --   --   --  1.9   Phosphorus mg/dL  --   --    --  9.1*       Drug levels (last 3 results):  No results for input(s): VANCOMYCINRA, VANCOMYCINPE, VANCOMYCINTR in the last 72 hours.    Microbiologic Results:  Microbiology Results (last 7 days)     Procedure Component Value Units Date/Time    Urine culture [[id number]] Collected: 02/19/22 2047    Order Status: No result Specimen: Urine Updated: 02/19/22 2241    Culture, Respiratory with Gram Stain [[id number]]     Order Status: No result Specimen: Respiratory     Blood culture x two cultures. Draw prior to antibiotics. [[id number]] Collected: 02/19/22 1932    Order Status: Sent Specimen: Blood from Peripheral, Hand, Left Updated: 02/1943    Blood culture x two cultures. Draw prior to antibiotics. [[id number]] Collected: 02/19/22 1932    Order Status: Sent Specimen: Blood from Peripheral, Hand, Left Updated: 02/1943

## 2022-02-20 NOTE — ASSESSMENT & PLAN NOTE
sCr 3.1, baseline 1.1-1.3. Likely prerenal in the setting of DKA    - Continue with hydration  - Continue DKA protocol  - Avoid nephrotoxins, renally dose meds  - BMP q4 hours.

## 2022-02-21 LAB
ALBUMIN SERPL BCP-MCNC: 1.9 G/DL (ref 3.5–5.2)
ALP SERPL-CCNC: 107 U/L (ref 55–135)
ALT SERPL W/O P-5'-P-CCNC: 11 U/L (ref 10–44)
ANION GAP SERPL CALC-SCNC: 14 MMOL/L (ref 8–16)
AST SERPL-CCNC: 30 U/L (ref 10–40)
BACTERIA UR CULT: NO GROWTH
BASOPHILS # BLD AUTO: 0.05 K/UL (ref 0–0.2)
BASOPHILS NFR BLD: 0.3 % (ref 0–1.9)
BILIRUB SERPL-MCNC: 0.4 MG/DL (ref 0.1–1)
BUN SERPL-MCNC: 31 MG/DL (ref 8–23)
CALCIUM SERPL-MCNC: 8.2 MG/DL (ref 8.7–10.5)
CHLORIDE SERPL-SCNC: 107 MMOL/L (ref 95–110)
CO2 SERPL-SCNC: 21 MMOL/L (ref 23–29)
CREAT SERPL-MCNC: 2 MG/DL (ref 0.5–1.4)
D DIMER PPP IA.FEU-MCNC: 0.5 MG/L FEU
DIFFERENTIAL METHOD: ABNORMAL
EOSINOPHIL # BLD AUTO: 0.1 K/UL (ref 0–0.5)
EOSINOPHIL NFR BLD: 0.3 % (ref 0–8)
ERYTHROCYTE [DISTWIDTH] IN BLOOD BY AUTOMATED COUNT: 14.4 % (ref 11.5–14.5)
EST. GFR  (AFRICAN AMERICAN): 35.9 ML/MIN/1.73 M^2
EST. GFR  (NON AFRICAN AMERICAN): 31 ML/MIN/1.73 M^2
FERRITIN SERPL-MCNC: 481 NG/ML (ref 20–300)
GLUCOSE SERPL-MCNC: 272 MG/DL (ref 70–110)
HCT VFR BLD AUTO: 33.3 % (ref 40–54)
HGB BLD-MCNC: 10.3 G/DL (ref 14–18)
IMM GRANULOCYTES # BLD AUTO: 0.11 K/UL (ref 0–0.04)
IMM GRANULOCYTES NFR BLD AUTO: 0.6 % (ref 0–0.5)
LDH SERPL L TO P-CCNC: 294 U/L (ref 110–260)
LYMPHOCYTES # BLD AUTO: 1.9 K/UL (ref 1–4.8)
LYMPHOCYTES NFR BLD: 10.3 % (ref 18–48)
MAGNESIUM SERPL-MCNC: 1.5 MG/DL (ref 1.6–2.6)
MCH RBC QN AUTO: 28.1 PG (ref 27–31)
MCHC RBC AUTO-ENTMCNC: 30.9 G/DL (ref 32–36)
MCV RBC AUTO: 91 FL (ref 82–98)
MONOCYTES # BLD AUTO: 1.1 K/UL (ref 0.3–1)
MONOCYTES NFR BLD: 6.3 % (ref 4–15)
NEUTROPHILS # BLD AUTO: 14.9 K/UL (ref 1.8–7.7)
NEUTROPHILS NFR BLD: 82.2 % (ref 38–73)
NRBC BLD-RTO: 0 /100 WBC
PHOSPHATE SERPL-MCNC: 3.4 MG/DL (ref 2.7–4.5)
PLATELET # BLD AUTO: 237 K/UL (ref 150–450)
PMV BLD AUTO: 9.5 FL (ref 9.2–12.9)
POCT GLUCOSE: 109 MG/DL (ref 70–110)
POCT GLUCOSE: 190 MG/DL (ref 70–110)
POCT GLUCOSE: 252 MG/DL (ref 70–110)
POCT GLUCOSE: 269 MG/DL (ref 70–110)
POCT GLUCOSE: 285 MG/DL (ref 70–110)
POCT GLUCOSE: 295 MG/DL (ref 70–110)
POCT GLUCOSE: 295 MG/DL (ref 70–110)
POCT GLUCOSE: 362 MG/DL (ref 70–110)
POCT GLUCOSE: 387 MG/DL (ref 70–110)
POCT GLUCOSE: 59 MG/DL (ref 70–110)
POCT GLUCOSE: 95 MG/DL (ref 70–110)
POTASSIUM SERPL-SCNC: 4.6 MMOL/L (ref 3.5–5.1)
PROT SERPL-MCNC: 5.3 G/DL (ref 6–8.4)
RBC # BLD AUTO: 3.66 M/UL (ref 4.6–6.2)
SODIUM SERPL-SCNC: 142 MMOL/L (ref 136–145)
TROPONIN I SERPL DL<=0.01 NG/ML-MCNC: 0.47 NG/ML (ref 0–0.03)
TROPONIN I SERPL DL<=0.01 NG/ML-MCNC: 0.65 NG/ML (ref 0–0.03)
VANCOMYCIN SERPL-MCNC: 16.2 UG/ML
WBC # BLD AUTO: 18.17 K/UL (ref 3.9–12.7)

## 2022-02-21 PROCEDURE — 25000003 PHARM REV CODE 250: Mod: HCNC | Performed by: INTERNAL MEDICINE

## 2022-02-21 PROCEDURE — 27000221 HC OXYGEN, UP TO 24 HOURS: Mod: HCNC

## 2022-02-21 PROCEDURE — 63600175 PHARM REV CODE 636 W HCPCS: Mod: HCNC | Performed by: STUDENT IN AN ORGANIZED HEALTH CARE EDUCATION/TRAINING PROGRAM

## 2022-02-21 PROCEDURE — 99233 PR SUBSEQUENT HOSPITAL CARE,LEVL III: ICD-10-PCS | Mod: HCNC,,, | Performed by: INTERNAL MEDICINE

## 2022-02-21 PROCEDURE — 99900035 HC TECH TIME PER 15 MIN (STAT): Mod: HCNC

## 2022-02-21 PROCEDURE — 84100 ASSAY OF PHOSPHORUS: CPT | Mod: HCNC | Performed by: STUDENT IN AN ORGANIZED HEALTH CARE EDUCATION/TRAINING PROGRAM

## 2022-02-21 PROCEDURE — 80053 COMPREHEN METABOLIC PANEL: CPT | Mod: HCNC | Performed by: STUDENT IN AN ORGANIZED HEALTH CARE EDUCATION/TRAINING PROGRAM

## 2022-02-21 PROCEDURE — 25000003 PHARM REV CODE 250: Mod: HCNC | Performed by: STUDENT IN AN ORGANIZED HEALTH CARE EDUCATION/TRAINING PROGRAM

## 2022-02-21 PROCEDURE — 94640 AIRWAY INHALATION TREATMENT: CPT | Mod: HCNC

## 2022-02-21 PROCEDURE — 83615 LACTATE (LD) (LDH) ENZYME: CPT | Mod: HCNC | Performed by: STUDENT IN AN ORGANIZED HEALTH CARE EDUCATION/TRAINING PROGRAM

## 2022-02-21 PROCEDURE — 27000207 HC ISOLATION: Mod: HCNC

## 2022-02-21 PROCEDURE — 83735 ASSAY OF MAGNESIUM: CPT | Mod: HCNC | Performed by: STUDENT IN AN ORGANIZED HEALTH CARE EDUCATION/TRAINING PROGRAM

## 2022-02-21 PROCEDURE — 93005 ELECTROCARDIOGRAM TRACING: CPT | Mod: HCNC

## 2022-02-21 PROCEDURE — 93010 ELECTROCARDIOGRAM REPORT: CPT | Mod: HCNC,,, | Performed by: INTERNAL MEDICINE

## 2022-02-21 PROCEDURE — 80202 ASSAY OF VANCOMYCIN: CPT | Mod: HCNC | Performed by: INTERNAL MEDICINE

## 2022-02-21 PROCEDURE — 84484 ASSAY OF TROPONIN QUANT: CPT | Mod: 91,HCNC | Performed by: STUDENT IN AN ORGANIZED HEALTH CARE EDUCATION/TRAINING PROGRAM

## 2022-02-21 PROCEDURE — 63600175 PHARM REV CODE 636 W HCPCS: Mod: HCNC | Performed by: INTERNAL MEDICINE

## 2022-02-21 PROCEDURE — 85025 COMPLETE CBC W/AUTO DIFF WBC: CPT | Mod: HCNC | Performed by: STUDENT IN AN ORGANIZED HEALTH CARE EDUCATION/TRAINING PROGRAM

## 2022-02-21 PROCEDURE — 25000242 PHARM REV CODE 250 ALT 637 W/ HCPCS: Mod: HCNC | Performed by: STUDENT IN AN ORGANIZED HEALTH CARE EDUCATION/TRAINING PROGRAM

## 2022-02-21 PROCEDURE — 85379 FIBRIN DEGRADATION QUANT: CPT | Mod: HCNC | Performed by: STUDENT IN AN ORGANIZED HEALTH CARE EDUCATION/TRAINING PROGRAM

## 2022-02-21 PROCEDURE — 93010 EKG 12-LEAD: ICD-10-PCS | Mod: HCNC,,, | Performed by: INTERNAL MEDICINE

## 2022-02-21 PROCEDURE — 99233 SBSQ HOSP IP/OBS HIGH 50: CPT | Mod: HCNC,,, | Performed by: INTERNAL MEDICINE

## 2022-02-21 PROCEDURE — 36415 COLL VENOUS BLD VENIPUNCTURE: CPT | Mod: HCNC | Performed by: STUDENT IN AN ORGANIZED HEALTH CARE EDUCATION/TRAINING PROGRAM

## 2022-02-21 PROCEDURE — 12000002 HC ACUTE/MED SURGE SEMI-PRIVATE ROOM: Mod: HCNC

## 2022-02-21 PROCEDURE — 27100098 HC SPACER: Mod: HCNC

## 2022-02-21 PROCEDURE — 82728 ASSAY OF FERRITIN: CPT | Mod: HCNC | Performed by: STUDENT IN AN ORGANIZED HEALTH CARE EDUCATION/TRAINING PROGRAM

## 2022-02-21 PROCEDURE — 94761 N-INVAS EAR/PLS OXIMETRY MLT: CPT | Mod: HCNC

## 2022-02-21 RX ORDER — VANCOMYCIN HCL IN 5 % DEXTROSE 1G/250ML
1000 PLASTIC BAG, INJECTION (ML) INTRAVENOUS ONCE
Status: COMPLETED | OUTPATIENT
Start: 2022-02-21 | End: 2022-02-21

## 2022-02-21 RX ORDER — MAGNESIUM SULFATE HEPTAHYDRATE 40 MG/ML
2 INJECTION, SOLUTION INTRAVENOUS ONCE
Status: CANCELLED | OUTPATIENT
Start: 2022-02-21 | End: 2022-02-21

## 2022-02-21 RX ADMIN — PIPERACILLIN AND TAZOBACTAM 4.5 G: 4; .5 INJECTION, POWDER, LYOPHILIZED, FOR SOLUTION INTRAVENOUS; PARENTERAL at 01:02

## 2022-02-21 RX ADMIN — ATORVASTATIN CALCIUM 40 MG: 20 TABLET, FILM COATED ORAL at 08:02

## 2022-02-21 RX ADMIN — REMDESIVIR 100 MG: 100 INJECTION, POWDER, LYOPHILIZED, FOR SOLUTION INTRAVENOUS at 09:02

## 2022-02-21 RX ADMIN — INSULIN ASPART 3 UNITS: 100 INJECTION, SOLUTION INTRAVENOUS; SUBCUTANEOUS at 02:02

## 2022-02-21 RX ADMIN — INSULIN ASPART 3 UNITS: 100 INJECTION, SOLUTION INTRAVENOUS; SUBCUTANEOUS at 08:02

## 2022-02-21 RX ADMIN — LACOSAMIDE 100 MG: 100 TABLET, FILM COATED ORAL at 08:02

## 2022-02-21 RX ADMIN — OXYCODONE HYDROCHLORIDE AND ACETAMINOPHEN 500 MG: 500 TABLET ORAL at 08:02

## 2022-02-21 RX ADMIN — ACETAMINOPHEN 1000 MG: 500 TABLET ORAL at 01:02

## 2022-02-21 RX ADMIN — ACETAMINOPHEN 1000 MG: 500 TABLET ORAL at 10:02

## 2022-02-21 RX ADMIN — MULTIPLE VITAMINS W/ MINERALS TAB 1 TABLET: TAB at 08:02

## 2022-02-21 RX ADMIN — DOXYCYCLINE HYCLATE 100 MG: 100 TABLET, COATED ORAL at 08:02

## 2022-02-21 RX ADMIN — ALBUTEROL SULFATE 2 PUFF: 108 INHALANT RESPIRATORY (INHALATION) at 01:02

## 2022-02-21 RX ADMIN — APIXABAN 5 MG: 5 TABLET, FILM COATED ORAL at 08:02

## 2022-02-21 RX ADMIN — PIPERACILLIN AND TAZOBACTAM 4.5 G: 4; .5 INJECTION, POWDER, LYOPHILIZED, FOR SOLUTION INTRAVENOUS; PARENTERAL at 08:02

## 2022-02-21 RX ADMIN — INSULIN ASPART 9 UNITS: 100 INJECTION, SOLUTION INTRAVENOUS; SUBCUTANEOUS at 07:02

## 2022-02-21 RX ADMIN — PIPERACILLIN AND TAZOBACTAM 4.5 G: 4; .5 INJECTION, POWDER, LYOPHILIZED, FOR SOLUTION INTRAVENOUS; PARENTERAL at 05:02

## 2022-02-21 RX ADMIN — VANCOMYCIN HYDROCHLORIDE 1000 MG: 1 INJECTION, POWDER, LYOPHILIZED, FOR SOLUTION INTRAVENOUS at 10:02

## 2022-02-21 RX ADMIN — ASPIRIN 81 MG: 81 TABLET, COATED ORAL at 08:02

## 2022-02-21 RX ADMIN — MUPIROCIN: 20 OINTMENT TOPICAL at 08:02

## 2022-02-21 RX ADMIN — PANTOPRAZOLE SODIUM 40 MG: 40 TABLET, DELAYED RELEASE ORAL at 08:02

## 2022-02-21 RX ADMIN — MUPIROCIN: 20 OINTMENT TOPICAL at 09:02

## 2022-02-21 RX ADMIN — CLOPIDOGREL 75 MG: 75 TABLET, FILM COATED ORAL at 08:02

## 2022-02-21 RX ADMIN — ALBUTEROL SULFATE 2 PUFF: 108 INHALANT RESPIRATORY (INHALATION) at 07:02

## 2022-02-21 RX ADMIN — INSULIN ASPART 9 UNITS: 100 INJECTION, SOLUTION INTRAVENOUS; SUBCUTANEOUS at 11:02

## 2022-02-21 RX ADMIN — AMIODARONE HYDROCHLORIDE 200 MG: 200 TABLET ORAL at 08:02

## 2022-02-21 RX ADMIN — ALBUTEROL SULFATE 2 PUFF: 108 INHALANT RESPIRATORY (INHALATION) at 12:02

## 2022-02-21 NOTE — NURSING
At 1400, patient bloodsugar was 76. Orders were given. 125 ml D10 was given at time. Patient bloodsugar increased to 105. Continue to monitor patient at this time per fellow.

## 2022-02-21 NOTE — PLAN OF CARE
Referrals for ip rehab faxed via [employer] system.       02/21/22 1601   Post-Acute Status   Post-Acute Authorization Placement  (Rehab)   Post-Acute Placement Status Referrals Sent   Discharge Delays None known at this time   Discharge Plan   Discharge Plan A Rehab   Discharge Plan B Home Health     Estee Fang LMSW  [facility]  X [phone]

## 2022-02-21 NOTE — ASSESSMENT & PLAN NOTE
Follows up with Pulm clinic in [place]. Last seen in December and was evaluated for pulmonary masses. Deemed to have mild COPD per notes. Not on any maintenance therapy.    - Continue albuterol inhaler

## 2022-02-21 NOTE — ASSESSMENT & PLAN NOTE
sCr 3.1, baseline 1.1-1.3. Likely prerenal in the setting of DKA    Improving with treatment of DKA  - Avoid nephrotoxins, renally dose meds

## 2022-02-21 NOTE — PROVIDER TRANSFER
Hospital Medicine ICU Acceptance Note    Date of Admit: 2/19/2022  Date of Transfer / Stepdown: 2/21/2022  ICU team stepping patient down: MICU  ICU team member giving verbal handoff: Sky Salcedo MD  Accepting  team: D    Brief History of Present Illness:      Kamar Muñoz is a 76 year old male with past medical history of CAD s/p 2 LAUREN in RCA (Jan 2022), HTN, HLD, p. A. 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 History was not obtained from patient due to encephalopathy. Patient's daughter at bedside reports the patient was recently in the ED on 2/17 for a mechanical fall after being discharged from rehab the same day. CTH and cervical spine revealed  evolving late subacute infarct involving the right frontal lobe with questionable petechial hemorrhage. Vascular neurology evaluated the patient and cleared him for discharge from without any new interventions. He was also tested positive for COVID-19 and was discharged yesterday from the ED. He subsequently received one dose of sotrovimab infusion for COVID and was in a normal state of health until this morning when his daughter found him lethargic and barely responsive prompting her to bring him to the ED. She reports the patient had no complaints prior to developing his AMS. Of note, the patient was recently admitted to [facility] from 01/25 through 02/13 for AMS concerning for CVA, however he was treated for metabolic encephalopathy 2/2 to DKA/HHS, sepsis and BRENDAN.      Upon arrival to the ED, he was placed on 6L NC, normotensive and tachycardic. Lactic  11.5, WBC 17.8, Glucose 1109, pH 7.17, Co2 15.6 HCO3 <5, AG unable to calculate. sCr 3.1. CXR with intersitial opacities. He received ~4L of IVF, vancomycin, zosyn, initiated on insulin shifted for hyperkalemia and calcium for cardioprotection. ECG without noticeable ischemic changes. CTH without new changes- discussed findings with radiology. Patient was admitted to the MICU for further management.      ICU Course:     Placed on remdesivir and broad spectrum IV abx in addition to insulin per DKA protocol. In MICU: Weaned supp O2 to room air; Transitioned to subq insulin; Improvement of AMS. BRENDAN improving gradually. Pt had discussion w/ family in MICU and transitioned from full code to DNR/DNI.  Step down to HM initiated 2/21.    Consultants and Procedures:     Consultants:  Shy Surgery    Procedures:    n/a    Transfer Information:     Diet:  Diabetic, cardiac needs    Physical Activity:  Pending further PT/OT eval    To Do / Pending Studies / Follow ups:  -f/u cultures and narrow abx as clinically indicated  -remdesivir x 5 days given hypoxia.  Holding decadron given presentation w/ DKA.  -resume home CCB as tolerated  -follow BRENDAN  -resume home antihypertensives as tolerated   -f/u dispo  -f/u wound care recs      Patient has been accepted by Hospital Medicine Team D, who will assume care of the patient upon arrival to the floor from the ICU. Please contact ICU team with any concerns prior to arrival. Please contact Hospital Medicine at [phone] or [phone] (please do NOT leave a voicemail) when patient arrives to the floor.    Imelda Rondon MD  Department of Hospital Medicine  [facility]

## 2022-02-21 NOTE — HOSPITAL COURSE
Patient treated for DKA and was successfully transitioned to SQ insulin.  PO intake improved.  AMS improving.  Patient DNR/DNI per personal request with family support.

## 2022-02-21 NOTE — PLAN OF CARE
CMICU DAILY GOALS       A: Awake    RASS: Goal -    Actual -     Restraint necessity:    B: Breathe   SBT: NA   C: Coordinate A & B, analgesics/sedatives   Pain: managed    SAT: Not intubated  D: Delirium   CAM-ICU: Overall CAM-ICU: Positive  E: Early(intubated/ Progressive (non-intubated) Mobility   MOVE Screen: Fail   Activity: Activity Management: Rolling - L1  FAS: Feeding/Nutrition   Diet order: Diet/Nutrition Received: consistent carb/diabetic diet,    T: Thrombus   DVT prophylaxis: VTE Required Core Measure: Pharmacological prophylaxis initiated/maintained  H: HOB Elevation   Head of Bed (HOB) Positioning: HOB at 30-45 degrees  U: Ulcer Prophylaxis   GI: yes  G: Glucose control   managed Glycemic Management: blood glucose monitored  S: Skin   Bathing/Skin Care: bath, complete, dressed/undressed, electrode patches/site rotation, incontinence care, linen changed  Device Skin Pressure Protection: adhesive use limited, positioning supports utilized, pressure points protected  Pressure Reduction Devices: positioning supports utilized, pressure-redistributing mattress utilized, foam padding utilized  Pressure Reduction Techniques: weight shift assistance provided  Skin Protection: incontinence pads utilized  B: Bowel Function   diarrhea   I: Indwelling Catheters   Mccord necessity:      Urethral Catheter 02/19/22 2100-Reason for Continuing Urinary Catheterization: Critically ill in ICU and requiring hourly monitoring of intake/output   CVC necessity: Yes  D: De-escalation Antibiotics   No    Family/Goals of care/Code Status   Code Status: DNR    24H Vital Sign Range  Temp:  [97.4 °F (36.3 °C)-97.8 °F (36.6 °C)]   Pulse:  []   Resp:  [16-20]   BP: ()/(36-69)   SpO2:  [89 %-100 %]      Shift Events  No acute events throughout shift.    VS and assessment per flow sheet, patient progressing towards goals as tolerated, plan of care reviewed with Edward and family, all concerns addressed, will continue to  monitor.

## 2022-02-21 NOTE — CONSULTS
"[facility] ([facility])  Adult Nutrition  Consult Note    SUMMARY     Recommendations    1. Continue current Diabetic diet, add Boost Glucose Control ONS.  2. RD to monitor & follow-up.    Goals: Meet % EEN, EPN by RD f/u date  Nutrition Goal Status: new  Communication of RD Recs: reviewed with RN    Assessment and Plan    Nutrition Problem:  Inadequate energy intake    Related to (etiology):   Decreased appetite     Signs and Symptoms (as evidenced by):   Poor PO intake    Interventions(treatment strategy):  Collaboration of nutrition care w/ other providers  ONS    Nutrition Diagnosis Status:   New     Reason for Assessment    Reason For Assessment: consult  Diagnosis: other (see comments) (Encephalopathy)  Relevant Medical History: HTN, HLD, COPD, DM  Interdisciplinary Rounds: did not attend    General Information Comments: +COVID-19. Per RN, pt not consuming solid foods; only wants ONS - just ordered. Unsure of PO intake PTA; UBW: 200# per chart review. Unable to assess for malnutrition. Will monitor energy intake & weight changes. Noted A1C 11.5 - RD to attach education material to discharge paperwork. Pt educated 1/13 & 1/27.  Nutrition Discharge Planning: Adequate PO intake    Nutrition/Diet History    Spiritual, Cultural Beliefs, Mormon Practices, Values that Affect Care: no  Factors Affecting Nutritional Intake: decreased appetite    Anthropometrics    Temp: 97.6 °F (36.4 °C)  Height: 6' 2" (188 cm)  Height (inches): 74 in  Weight: 76.4 kg (168 lb 6.9 oz)  Weight (lb): 168.43 lb  Ideal Body Weight (IBW), Male: 190 lb  % Ideal Body Weight, Male (lb): 88.65 %  BMI (Calculated): 21.6  BMI Grade: 18.5-24.9 - normal    Lab/Procedures/Meds    Pertinent Labs Reviewed: reviewed  Pertinent Labs Comments: BUN 31, Creat 2, GFR 31, A1C 11.5  Pertinent Medications Reviewed: reviewed  Pertinent Medications Comments: MVI    Estimated/Assessed Needs    Weight Used For Calorie Calculations: 76.4 " kg (168 lb 6.9 oz)     Energy Calorie Requirements (kcal): 1941 kcal/d  Energy Need Method: [place]-St Jeor (1.25 PAL)     Protein Requirements: 92 g/d (1.2 g/kg)  Weight Used For Protein Calculations: 76.4 kg (168 lb 6.9 oz)     Estimated Fluid Requirement Method: other (see comments) (Per MD or 1 mL/kcal)  RDA Method (mL): 1941    Nutrition Prescription Ordered    Current Diet Order: 1500 kcal ADA    Evaluation of Received Nutrient/Fluid Intake    I/O: +6.1L since admit    Comments: LBM: 2/20    Nutrition Risk    Level of Risk/Frequency of Follow-up: (1x/week)     Monitor and Evaluation    Food and Nutrient Intake: energy intake, food and beverage intake  Food and Nutrient Adminstration: diet order  Physical Activity and Function: nutrition-related ADLs and IADLs  Anthropometric Measurements: weight, weight change  Biochemical Data, Medical Tests and Procedures: inflammatory profile, lipid profile, glucose/endocrine profile, electrolyte and renal panel  Nutrition-Focused Physical Findings: overall appearance     Nutrition Follow-Up    RD Follow-up?: Yes

## 2022-02-21 NOTE — SUBJECTIVE & OBJECTIVE
Interval History/Significant Events:  Patient was weaned from insulin gtt and placed on SQ insulin without incident.  He is stable on room air and hemodynamically stable.  Needs step down for further titration of insulin following severe DKA.    Review of Systems   Constitutional:  Positive for activity change and fatigue. Negative for chills, diaphoresis and fever.   HENT:  Negative for congestion, postnasal drip and rhinorrhea.    Eyes: Negative.    Respiratory:  Negative for apnea, cough, shortness of breath and wheezing.    Cardiovascular:  Negative for chest pain, palpitations and leg swelling.   Gastrointestinal:  Negative for abdominal distention, abdominal pain, constipation, diarrhea, nausea and vomiting.   Endocrine: Negative.    Genitourinary: Negative.    Musculoskeletal: Negative.    Skin:  Positive for pallor.   Allergic/Immunologic: Negative.    Neurological:  Negative for dizziness, facial asymmetry, light-headedness and headaches.   Hematological:  Negative for adenopathy.   Psychiatric/Behavioral:  Negative for agitation, behavioral problems and confusion.    Objective:     Vital Signs (Most Recent):  Temp: 97.6 °F (36.4 °C) (02/21/22 1252)  Pulse: 80 (02/21/22 1336)  Resp: 18 (02/21/22 1336)  BP: (!) 126/59 (02/21/22 1252)  SpO2: 95 % (02/21/22 1336)   Vital Signs (24h Range):  Temp:  [97.4 °F (36.3 °C)-97.8 °F (36.6 °C)] 97.6 °F (36.4 °C)  Pulse:  [72-95] 80  Resp:  [16-20] 18  SpO2:  [89 %-100 %] 95 %  BP: ()/(36-69) 126/59   Weight: 76.4 kg (168 lb 6.9 oz)  Body mass index is 21.63 kg/m².      Intake/Output Summary (Last 24 hours) at 2/21/2022 1358  Last data filed at 2/21/2022 0945  Gross per 24 hour   Intake 1860.25 ml   Output 875 ml   Net 985.25 ml       Physical Exam  Constitutional:       General: He is not in acute distress.     Appearance: He is normal weight. He is not ill-appearing or toxic-appearing.   HENT:      Head: Normocephalic and atraumatic.      Nose: Nose normal. No  congestion.      Mouth/Throat:      Mouth: Mucous membranes are moist.   Eyes:      Extraocular Movements: Extraocular movements intact.   Cardiovascular:      Rate and Rhythm: Normal rate and regular rhythm.   Pulmonary:      Effort: Pulmonary effort is normal. No respiratory distress.      Breath sounds: Normal breath sounds.   Abdominal:      General: Abdomen is flat. There is no distension.      Palpations: Abdomen is soft.   Musculoskeletal:      Cervical back: Normal range of motion.      Right lower leg: No edema.      Left lower leg: No edema.   Skin:     General: Skin is warm.   Neurological:      Mental Status: He is alert. Mental status is at baseline.       Vents:  Oxygen Concentration (%): 0 (02/20/22 0600)  Lines/Drains/Airways       Drain  Duration                  Urethral Catheter 02/19/22 2100 1 day              Peripheral Intravenous Line  Duration                  Peripheral IV - Single Lumen 02/19/22 1905 18 G Left Antecubital 1 day         Peripheral IV - Single Lumen 02/19/22 2158 18 G Right Antecubital 1 day                  Significant Labs:    CBC/Anemia Profile:  Recent Labs   Lab 02/19/22  1932 02/19/22  2242 02/20/22  0411 02/21/22  0429   WBC 17.76*  --  14.82* 18.17*   HGB 12.1*  --  10.3* 10.3*   HCT 44.5  --  34.2* 33.3*     --  256 237   *  --  96 91   RDW 14.0  --  14.1 14.4   FERRITIN  --  564*  --   --         Chemistries:  Recent Labs   Lab 02/19/22  1932 02/19/22  2113 02/20/22  0046 02/20/22  0411 02/20/22  0810 02/20/22  1600 02/20/22  1955 02/21/22  0429   *  --    < > 136  136   < > 145 138 142   K 6.4*  --    < > 3.9  3.9   < > 3.4* 4.9 4.6   CL 89*  --    < > 101  101   < > 107 104 107   CO2 <5*  --    < > 15*  15*   < > 27 23 21*   BUN 37*  --    < > 36*  36*   < > 31* 26* 31*   CREATININE 3.1*  --    < > 3.0*  3.0*   < > 2.1* 1.9* 2.0*   CALCIUM 9.6  --    < > 8.7  8.7   < > 8.7 7.9* 8.2*   ALBUMIN 2.4*  --   --  2.0*  --   --   --  1.9*    PROT 7.2  --   --  6.1  --   --   --  5.3*   BILITOT 0.4  --   --  0.3  --   --   --  0.4   ALKPHOS 137*  --   --  111  --   --   --  107   ALT 11  --   --  9*  --   --   --  11   AST 14  --   --  24  --   --   --  30   MG  --  1.9  --  1.8  --   --   --  1.5*   PHOS  --  9.1*   < > 2.3*   < > 3.1 3.9 3.4    < > = values in this interval not displayed.       All pertinent labs within the past 24 hours have been reviewed.    Significant Imaging:  I have reviewed all pertinent imaging results/findings within the past 24 hours.

## 2022-02-21 NOTE — ASSESSMENT & PLAN NOTE
A1c 11.   - discontinued insulin drip and transition to SQ insulin (detemir 14u BID, aspart 9 TID, ISS)

## 2022-02-21 NOTE — ASSESSMENT & PLAN NOTE
History of paroxysmal atrial fibrillation on home  Metoprolol XL 50 mg daily, diltiazem  mg daily and amiodarone 200 mg daily.  - Continue po apixaban  - Holding metoprolol and diltiazem in the setting of sepsis.  BP still soft, so resume as indicated.  - Continue amiodarone

## 2022-02-21 NOTE — HOSPITAL COURSE
ICU Course:  Placed on remdesivir and broad spectrum IV abx in addition to insulin per DKA protocol. In MICU: Weaned supp O2 to room air; Transitioned to subq insulin; Improvement of AMS. BRENDAN improving gradually. Pt had discussion w/ family in MICU and transitioned from full code to DNR/DNI.  Step down to HM initiated 2/21.    Hospital Medicine Course:  Pt w/ episode of CP and hypoglycemia upon stepdown. Ischemic work-up largely unremarkable with trop down trending from admission. Detemir dosing adjusted from BID to qhs. He had additional hypoglycemic episode upon re-uptitration of long-acting insulin from 12 to 15. Dosing decreased to 13u as patient was hyperglycemic to 230s w/12u qhs. Worsening hyperglycemia to 270s- insulin dose modified to 3u TID wm, and detemir 14u qhs.    Episode of abdominal pain and diarrhea 02/25- appeared to be 2/2 antibiotic use. Antibiotics stopped 02/25. Pt w/persistent diarrhea- c diff studies sent- loperamide stopped.     Pt was found to have St 3 sacral pressure ulcer. Wound care consulted with recommendations for care.    Patient reportedly hypotensive on 3/5 and endorsing nausea, septic work up ordered but ultimately unrevealing. Patient's BP responded after 2.5L of fluid. Presume hypovolemia was the source of his hypotension  blood culture negative.      COVID infection treated with remdesivir and oxygen with improvement.  Dexamethasone held due to hyperglycemia. Endocrine following for insulin regimen.    Patient with stable pulmonary nodules but unsafe for biopsy due to risk of pneumothorax.  Continue ongoing monitoring by PCP.

## 2022-02-21 NOTE — ASSESSMENT & PLAN NOTE
Viral Pneumonia due to COVID-19  - COVID-19 testing:   -Patient is identified as High risk for severe complications of COVID 19 based on COVID risk score of 8   Initiate standard COVID protocols; COVID-19 testing Collection Date: 8/9/2021 Collection Time:   3:41 PM ,Infection Control notification  and isolation- respiratory, contact and droplet per protocol  COVID vaccinated with booster.   Received 1 dose of sotrovimab infusion 02/18/2022     - Diagnostics: Trend LDH, CRP, Ferritin, D-dimer Q48hrs if stable, more frequently if patient decompensating.     Lymphopenia, hyponatremia, hyperferritinemia, elevated troponin, elevated d-dimer, age, and medical comorbidities are significant predictors of poor clinical outcome     - Management: Per [facility] COVID Treatment Protocol (4/15/20)       - Monitoring:          - Telemetry & Continuous Pulse Oximetry       - Targeted therapy Remdesivir, 200mg IV x1, followed by 100mg IV daily x5 days total,  - Holding dexamethasone PO/IV 6mg daily x10 days in the setting of DKA/HHS.-  -Weaning 02 as tolerated  - Anticoagulation, Patient admitted to critical care up initial presentation and on triple therapy- Will continue home apixaban dose anticoagulation       - Antibiotics:  - if indications, CXR findings, elevated procal. See protocol for alternatives.   - Discontinue early if low concern for bacterial co-infection          - Initiated on IV vanc, zosyn and doxycycline (unable to use azithromycin due to prolong QTc)     - Nutrition:           - Multivitamin PO daily          - Add Boost supplement          - Vitamin D 1000IU daily if deficient          - Ascorbic acid 500mg PO bid    - Supportive Care:          - acetaminophen 650mg PO Q6hr PRN fever/headache          - loperamide PRN viral diarrhea

## 2022-02-21 NOTE — ASSESSMENT & PLAN NOTE
In the setting of DKA and sepsis upon admission.  Admit CT Head without any concerning new findings- no new infarct as discussed with radiology    Resolved.

## 2022-02-21 NOTE — PLAN OF CARE
[facility] ([place] [address])  Initial Discharge Assessment       Primary Care Provider: Basim Guerrero MD    Admission Diagnosis: Shock [R57.9]  Metabolic acidosis [E87.2]  SOB (shortness of breath) [R06.02]  Prolonged Q-T interval on ECG [R94.31]  Wide-complex tachycardia [I47.2]  Diabetic ketoacidosis with coma associated with other specified diabetes mellitus [E13.11]  COVID-19 virus infection [U07.1]    Admission Date: 2/19/2022  Expected Discharge Date:     Discharge Barriers Identified: (P) None    Payor: [employer] MANAGED MEDICARE / Plan: [employer] MEDICARE HMO / Product Type: Capitation /     Extended Emergency Contact Information  Primary Emergency Contact: Marisa Sampson  Mobile Phone: [phone]  Relation: Daughter  Preferred language: English   needed? No  Secondary Emergency Contact: Aimee Muñoz  Address: [address] [place] [address] [place] [address] [facility]  Home Phone: [phone]  Mobile Phone: [phone]  Relation: Spouse    Discharge Plan A: (P) Home Health, Home with family (Pt prefers [facility])  Discharge Plan B: (P) Home with family      [id number] - [place] - [address] [facility]  [address] [place] [address]  Phone: [phone] Fax: [phone]    [facility]  [address] [place] [address]  [place] [address]  Phone: [phone] Fax: [phone]      Initial Assessment (most recent)       Adult Discharge Assessment - 02/21/22 1336          Discharge Assessment    Assessment Type Discharge Planning Assessment (P)      Confirmed/corrected address, phone number and insurance Yes (P)      Confirmed Demographics Correct on Facesheet (P)      Source of Information family (P)      If unable to respond/provide information was family/caregiver contacted? Yes (P)      Contact Name/Number Delvis Peraza (P)      Does patient/caregiver understand observation status Yes  (P)      Communicated ALLIE with patient/caregiver Yes (P)      Reason For Admission Shock (P)      Lives With spouse (P)    Wife is in hospital at the time of this assessment    Facility Arrived From: home (P)      Do you expect to return to your current living situation? Yes (P)      Do you have help at home or someone to help you manage your care at home? Yes (P)      Who are your caregiver(s) and their phone number(s)? Adult children (on file) (P)      Prior to hospitilization cognitive status: Unable to Assess (P)      Current cognitive status: Alert/Oriented (P)    bs nurse noted slight confusion    Walking or Climbing Stairs Difficulty ambulation difficulty, requires equipment (P)      Mobility Management walker (P)      Dressing/Bathing Difficulty none (P)      Equipment Currently Used at Home walker, rolling (P)      Readmission within 30 days? No (P)      Patient currently being followed by outpatient case management? No (P)      Do you currently have service(s) that help you manage your care at home? No (P)      Do you take prescription medications? Yes (P)      Do you have prescription coverage? Yes (P)      Do you have any problems affording any of your prescribed medications? No (P)      Is the patient taking medications as prescribed? yes (P)      Who is going to help you get home at discharge? daughter (P)      How do you get to doctors appointments? family or friend will provide (P)      Are you on dialysis? No (P)      Do you take coumadin? Yes (P)      Discharge Plan A Home Health;Home with family (P)    Pt prefers [facility]    Discharge Plan B Home with family (P)      DME Needed Upon Discharge  other (see comments) (P)    TBD    Discharge Plan discussed with: Adult children (P)      Discharge Barriers Identified None (P)                      Johanne Quevedo LMSW  Case Management Social Worker   [facility]

## 2022-02-21 NOTE — NURSING
Team was notified about patient blood sugars and insulin rate per dka protocol. Orders were to hold insulin drip and monitor every 30 minutes. Patient appears to be in no distress at time and resting in bed. Will continue to monitor

## 2022-02-21 NOTE — RESIDENT HANDOFF
ICU Transfer of Care Note  Critical Care Medicine    Admit Date: 2/19/2022  LOS: 2    CC: Encephalopathy, metabolic    Code Status: DNR     Transfer to [facility] Medicine  .     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 History was not obtained from patient due to encephalopathy. Patient's daughter at bedside reports the patient was recently in the ED on 2/17 for a mechanical fall after being discharged from rehab the same day. CTH and cervical spine revealed  evolving late subacute infarct involving the right frontal lobe with questionable petechial hemorrhage. Vascular neurology evaluated the patient and cleared him for discharge from without any new interventions. He was also tested positive for COVID-19 and was discharged yesterday from the ED. He subsequently received one dose of sotrovimab infusion for COVID and was in a normal state of health until this morning when his daughter found him lethargic and barely responsive prompting her to bring him to the ED. She reports the patient had no complaints prior to developing his AMS. Of note, the patient was recently [place] [facility] [place]13 for AMS concerning for CVA, however he was treated for metabolic encephalopathy 2/2 to DKA/HHS, sepsis and BRENDAN.     Upon arrival to the ED, he was placed on 6L NC, normotensive and tachycardic. Lactic 11.5, WBC 17.8, Glucose 1109, pH 7.17, Co2 15.6 HCO3 <5, AG unable to calculate. sCr 3.1. CXR with intersitial opacities. He received ~4L of IVF, vancomycin, zosyn, initiated on insulin shifted for hyperkalemia and calcium for cardioprotection. ECG without noticeable ischemic changes. CTH without new changes- discussed findings  with radiology. Patient was admitted to the MICU for further management.        Hospital/ICU Course:  Admitted to ICU from ED on 2/19 in DKA. Gap closed.Transitioned to SQ insulin. Ready for stepdown    To Follow Up:     Glucose checks/Endocrine    Discharge Plan:     Home    Call with questions. [id number]

## 2022-02-21 NOTE — HPI
Kamar Muñoz is a 76 year old male with past medical history of CAD s/p 2 LAUREN in RCA (Jan 2022), HTN, HLD, p. A. 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 History was not obtained from patient due to encephalopathy. Patient's daughter at bedside reports the patient was recently in the ED on 2/17 for a mechanical fall after being discharged from rehab the same day. CTH and cervical spine revealed  evolving late subacute infarct involving the right frontal lobe with questionable petechial hemorrhage. Vascular neurology evaluated the patient and cleared him for discharge from without any new interventions. He was also tested positive for COVID-19 and was discharged yesterday from the ED. He subsequently received one dose of sotrovimab infusion for COVID and was in a normal state of health until this morning when his daughter found him lethargic and barely responsive prompting her to bring him to the ED. She reports the patient had no complaints prior to developing his AMS. Of note, the patient [place] [facility] [place] for AMS concerning for CVA, however he was treated for metabolic encephalopathy 2/2 to DKA/HHS, sepsis and BRENDAN.      Upon arrival to the ED, he was placed on 6L NC, normotensive and tachycardic. Lactic 11.5, WBC 17.8, Glucose 1109, pH 7.17, Co2 15.6 HCO3 <5, AG unable to calculate. sCr 3.1. CXR with intersitial opacities. He received ~4L of IVF, vancomycin, zosyn, initiated on insulin shifted for hyperkalemia and calcium for  cardioprotection. ECG without noticeable ischemic changes. CTH without new changes- discussed findings with radiology. Patient was admitted to the MICU for further management.

## 2022-02-21 NOTE — ASSESSMENT & PLAN NOTE
Upon admission:  Organ dysfunction indicated by Acute kidney injury, Encephalopathy  and Acute respiratory failure  Source- Unclear. CXR with diffuse interstitial opacities, however no consolidation noted. UA with pyuria, without elevated leuks. Blood cultures pending. Possibly due to sacral wound, although doesn't look grossly infected.     Started on broad spectrum abx at admission with vanc/zosyn/doxy.    - vancomycin discontinued today as no MRSA has isolated.  No infectious source found but leukocytosis persists.  Consider de-escalation further based on continued improvement.  Day 3 abx today.    F/u Urine and blood cultures  - Wound care consulted for sacral decubitus wound.   -lactic acid overall trend improved

## 2022-02-21 NOTE — HPI
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 History was not obtained from patient due to encephalopathy. Patient's daughter at bedside reports the patient was recently in the ED on 2/17 for a mechanical fall after being discharged from rehab the same day. CTH and cervical spine revealed  evolving late subacute infarct involving the right frontal lobe with questionable petechial hemorrhage. Vascular neurology evaluated the patient and cleared him for discharge from without any new interventions. He was also tested positive for COVID-19 and was discharged yesterday from the ED. He subsequently received one dose of sotrovimab infusion for COVID and was in a normal state of health until this morning when his daughter found him lethargic and barely responsive prompting her to bring him to the ED. She reports the patient had no complaints prior to developing his AMS. Of note, the patient was recently admitted to [facility] from 01/25 through 02/13 for AMS concerning for CVA, however he was treated for metabolic encephalopathy 2/2 to DKA/HHS, sepsis and BRENDAN.      Upon arrival to the ED, he was placed on 6L NC, normotensive and tachycardic. Lactic 11.5, WBC 17.8, Glucose 1109, pH 7.17, Co2 15.6 HCO3 <5, AG unable to calculate. sCr 3.1. CXR with intersitial opacities. He received ~4L of IVF, vancomycin, zosyn, initiated on insulin shifted for hyperkalemia and calcium for cardioprotection. ECG without noticeable ischemic changes. CTH without new changes- discussed findings with radiology. Patient was admitted to the MICU for further management.

## 2022-02-21 NOTE — PLAN OF CARE
Recommendations     1. Continue current Diabetic diet, add Boost Glucose Control ONS.  2. RD to monitor & follow-up.     Goals: Meet % EEN, EPN by RD f/u date  Nutrition Goal Status: new  Communication of RD Recs: reviewed with RN

## 2022-02-21 NOTE — PROGRESS NOTES
Pharmacokinetic Assessment Follow Up: IV Vancomycin    Vancomycin serum concentration assessment(s):  - Vancomycin random resulted at 16.2 mcg/mL and was drawn about 12 hours after previous dose of 750 mg IV once.  - The measurement is within the desired definitive target range of 10 to 20 mcg/mL.     Vancomycin Regimen Plan:  - Will give vancomycin 1250 mg x1 today   - Draw vancomycin random with AM labs tomorrow    Drug levels (last 3 results):  Recent Labs   Lab Result Units 02/20/22  1315 02/21/22  0429   Vancomycin, Random ug/mL 18.8 16.2       Pharmacy will continue to follow and monitor vancomycin.    Please contact pharmacy at extension [phone] for questions regarding this assessment.    Thank you for the consult,   Mayi Webb       Patient brief summary:  Kamar Muñoz is a 76 y.o. male initiated on antimicrobial therapy with IV Vancomycin for treatment of sepsis    The patient's current regimen is pulse dosning     Drug Allergies:   Review of patient's allergies indicates:   Allergen Reactions    Iodine      Other reaction(s): swelling  Other reaction(s): Itching  Other reaction(s): Rash       Actual Body Weight:   76.4 kg    Renal Function:   Estimated Creatinine Clearance: 32.9 mL/min (A) (based on SCr of 2 mg/dL (H)).,     Dialysis Method (if applicable):  N/A    CBC (last 72 hours):  Recent Labs   Lab Result Units 02/19/22 1932 02/20/22  0411 02/21/22  0429   WBC K/uL 17.76* 14.82* 18.17*   Hemoglobin g/dL 12.1* 10.3* 10.3*   Hematocrit % 44.5 34.2* 33.3*   Platelets K/uL 380 256 237   Gran % % 79.9* 84.5* 82.2*   Lymph % % 11.3* 8.3* 10.3*   Mono % % 5.7 5.9 6.3   Eosinophil % % 0.3 0.1 0.3   Basophil % % 0.7 0.3 0.3   Differential Method  Automated Automated Automated       Metabolic Panel (last 72 hours):  Recent Labs   Lab Result Units 02/19/22 1932 02/19/22 2047 02/19/22  2113 02/20/22  0046 02/20/22  0411 02/20/22  0810 02/20/22  1600 02/20/22  1955 02/21/22  0429   Sodium mmol/L 135*  --    --  133* 136  136 137 145 138 142   Potassium mmol/L 6.4*  --   --  4.8 3.9  3.9 3.7 3.4* 4.9 4.6   Chloride mmol/L 89*  --   --  95 101  101 103 107 104 107   CO2 mmol/L <5*  --   --  10* 15*  15* 23 27 23 21*   Glucose mg/dL 1,109*  --   --  957* 727*  727* 386* 54* 223* 272*   Glucose, UA   --  3+*  --   --   --   --   --   --   --    BUN mg/dL 37*  --   --  39* 36*  36* 30* 31* 26* 31*   Creatinine mg/dL 3.1*  --   --  3.0* 3.0*  3.0* 2.3* 2.1* 1.9* 2.0*   Albumin g/dL 2.4*  --   --   --  2.0*  --   --   --  1.9*   Total Bilirubin mg/dL 0.4  --   --   --  0.3  --   --   --  0.4   Alkaline Phosphatase U/L 137*  --   --   --  111  --   --   --  107   AST U/L 14  --   --   --  24  --   --   --  30   ALT U/L 11  --   --   --  9*  --   --   --  11   Magnesium mg/dL  --   --  1.9  --  1.8  --   --   --  1.5*   Phosphorus mg/dL  --   --  9.1* 5.2* 2.3* 1.9* 3.1 3.9 3.4       Vancomycin Administrations:  vancomycin given in the last 96 hours                   vancomycin (VANCOCIN) 2,250 mg in dextrose 5 % 500 mL IVPB ()  Restarted 02/19/22 2246     2,250 mg New Bag  2241                Microbiologic Results:  Microbiology Results (last 7 days)     Procedure Component Value Units Date/Time    Urine culture [[id number]] Collected: 02/19/22 2047    Order Status: Completed Specimen: Urine Updated: 02/21/22 0013     Urine Culture, Routine No growth    Narrative:      Specimen Source->Urine    Blood culture x two cultures. Draw prior to antibiotics. [[id number]] Collected: 02/19/22 1932    Order Status: Completed Specimen: Blood from Peripheral, Hand, Left Updated: 02/20/22 2212     Blood Culture, Routine No Growth to date      No Growth to date    Narrative:      Aerobic and anaerobic    Blood culture x two cultures. Draw prior to antibiotics. [[id number]] Collected: 02/19/22 1932    Order Status: Completed Specimen: Blood from Peripheral, Hand, Left Updated: 02/20/22 2212     Blood Culture, Routine No Growth to date       No Growth to date    Narrative:      Aerobic and anaerobic    Culture, Respiratory with Gram Stain [[id number]]     Order Status: No result Specimen: Respiratory

## 2022-02-21 NOTE — PROGRESS NOTES
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 History was not obtained from patient due to encephalopathy. Patient's daughter at bedside reports the patient was recently in the ED on 2/17 for a mechanical fall after being discharged from rehab the same day. CTH and cervical spine revealed  evolving late subacute infarct involving the right frontal lobe with questionable petechial hemorrhage. Vascular neurology evaluated the patient and cleared him for discharge from without any new interventions. He was also tested positive for COVID-19 and was discharged yesterday from the ED. He subsequently received one dose of sotrovimab infusion for COVID and was in a normal state of health until this morning when his daughter found him lethargic and barely responsive prompting her to bring him to the ED. She reports the patient had no complaints prior to developing his AMS. Of note, the patient was recently admitted to [facility] [place] from 01/25 through 02/13 for AMS concerning for CVA, however he was treated for metabolic encephalopathy 2/2 to DKA/HHS, sepsis and BRENDAN.     Upon arrival to the ED, he was placed on 6L NC, normotensive and tachycardic. Lactic 11.5, WBC 17.8, Glucose 1109, pH 7.17, Co2 15.6 HCO3 <5, AG unable to calculate. sCr 3.1. CXR with intersitial opacities. He received ~4L of IVF, vancomycin, zosyn, initiated  on insulin shifted for hyperkalemia and calcium for cardioprotection. ECG without noticeable ischemic changes. CTH without new changes- discussed findings with radiology. Patient was admitted to the MICU for further management.        Hospital/ICU Course:  No notes on file    No new subjective & objective note has been filed under this hospital service since the last note was generated.      ABG  Recent Labs   Lab 02/19/22 1938   PH 7.174*   PO2 127*   PCO2 15.6*   HCO3 5.8*   BE -23     Assessment/Plan:     Neuro  * Encephalopathy, metabolic  resolved    Focal seizures  Continue home lancosamide       Embolic stroke involving right middle cerebral artery  Hx of CVA in Jan 2022. CT Head with evolving infarcts in right frontal and left cerebellar hemispheres. No concern for acute stroke per discussion with radiology.     - Continue home antiplatelets, statin and eliquis    Pulmonary  Acute hypoxemic respiratory failure  - Wean O2 to maintain SpO2 88-92% given hx of COPD.  Currently on RA    Pulmonary nodules  Has stable bilateral  pulmonary masses which could be malignancy per outpatient pulmonology notes. No pathology report as none of the nodules appeared to be safe for biopsy given high risk of PTX is high with many adjacent bulla.         Chronic pulmonary heart disease  - Continue albuterol inhaler         Cardiac/Vascular  Paroxysmal atrial fibrillation  History of paroxysmal atrial fibrillation on home  Metoprolol XL 50 mg daily, diltiazem  mg daily and amiodarone 200 mg daily.  - Continue po apixaban  - Holding metoprolol and diltiazem in the setting of sepsis.  BP still soft, so resume as indicated.  - Continue amiodarone       Coronary artery disease involving native coronary artery of native heart with unstable angina pectoris  Hx of CAD s/p placement of 2 LAUREN in RCA in 01/09/2022.     - Continue home ASA, plavix and statin    Hypertension associated with diabetes  resume home antihypertensives as  tolerated.  Currently still holding for boarder line BP    Renal/  BRENDAN (acute kidney injury)  Slowly improving with treatment of DKA in improvement in PO intake.      ID  Severe sepsis  Started on broad spectrum abx at admission with vanc/zosyn/doxy.    - vancomycin discontinued today as no MRSA has isolated.  No infectious source found but leukocytosis persists.  Consider de-escalation further based on continued improvement.  Day 3 abx today.    Endocrine  Diabetic ketoacidosis with coma associated with type 2 diabetes mellitus  Resolved at this time      Type 2 diabetes mellitus with hyperglycemia, with long-term current use of insulin   A1c 11.   - discontinued insulin drip and transition to SQ insulin (detemir 14u BID, aspart 9 TID, ISS)    Other  Palliative care status  Disucssed code status with patient's daughter. She states the patient and her family just started discussing advance directives but there has been no final conclusion. Patient's spouse is currently ill and in the hospital. Daughter wants patient to remains full code and will further address code status and intubation wishes if he were to further decline.     COVID-19  Seems to have minimal contribution to current picture.  On room air  remdesivir x 5 days    eliquis  PPI    PIV    Sacral wound present and documented as altered skin integrity upon arrival.    Patient ready for step down to hospital medicine.    Sky Salcedo MD  Critical Care Medicine  [facility] ([place])

## 2022-02-21 NOTE — ASSESSMENT & PLAN NOTE
Patient with uncontrolled DM presenting with metabolic encephalopathy in the setting of DKA with coma. Suspect patient developed DKA in the setting of sepsis/ COVID-10   Glucose 1109, pH 7.17, Co2 15.6 HCO3 <5, AG unable to calculate    DKA protocol completed and DKA resolved in MICU and Pt stepped down on subq insulin.    Hypoglycemia episode upon step-down, detemir adjusted from BID to qhs per home long acting insulin  Continue aspart TIDWM  Diabetic diet  POCT BGx4  Cotinue to titrate short and long acting insulin needs as indicated to in-pt hospital goal 140-180

## 2022-02-21 NOTE — ASSESSMENT & PLAN NOTE
History of paroxysmal atrial fibrillation on home  Metoprolol XL 50 mg daily, diltiazem  mg daily and amiodarone 200 mg daily.  - Continue po apixaban  - Continue amiodarone  - metoprolol and diltiazem held in MICU  in the setting of sepsis.   -resume BB and CBB as clinically indicated and tolerated per BP

## 2022-02-21 NOTE — ASSESSMENT & PLAN NOTE
In setting of COVID infection. Resolved upon step-down from MICU. ORA.   maintain SpO2 88-92% given hx of COPD

## 2022-02-22 ENCOUNTER — TELEPHONE (OUTPATIENT)
Dept: PRIMARY CARE CLINIC | Facility: CLINIC | Age: 77
End: 2022-02-22
Payer: MEDICARE

## 2022-02-22 LAB
ALBUMIN SERPL BCP-MCNC: 2 G/DL (ref 3.5–5.2)
ALP SERPL-CCNC: 154 U/L (ref 55–135)
ALT SERPL W/O P-5'-P-CCNC: 12 U/L (ref 10–44)
ANION GAP SERPL CALC-SCNC: 10 MMOL/L (ref 8–16)
AST SERPL-CCNC: 28 U/L (ref 10–40)
BASOPHILS # BLD AUTO: 0.05 K/UL (ref 0–0.2)
BASOPHILS NFR BLD: 0.4 % (ref 0–1.9)
BILIRUB SERPL-MCNC: 0.4 MG/DL (ref 0.1–1)
BUN SERPL-MCNC: 24 MG/DL (ref 8–23)
CALCIUM SERPL-MCNC: 8.6 MG/DL (ref 8.7–10.5)
CHLORIDE SERPL-SCNC: 108 MMOL/L (ref 95–110)
CO2 SERPL-SCNC: 24 MMOL/L (ref 23–29)
CREAT SERPL-MCNC: 1.2 MG/DL (ref 0.5–1.4)
DIFFERENTIAL METHOD: ABNORMAL
EOSINOPHIL # BLD AUTO: 0.5 K/UL (ref 0–0.5)
EOSINOPHIL NFR BLD: 4.3 % (ref 0–8)
ERYTHROCYTE [DISTWIDTH] IN BLOOD BY AUTOMATED COUNT: 14.3 % (ref 11.5–14.5)
EST. GFR  (AFRICAN AMERICAN): >60 ML/MIN/1.73 M^2
EST. GFR  (NON AFRICAN AMERICAN): 57.6 ML/MIN/1.73 M^2
GLUCOSE SERPL-MCNC: 24 MG/DL (ref 70–110)
HCT VFR BLD AUTO: 34.8 % (ref 40–54)
HGB BLD-MCNC: 11.4 G/DL (ref 14–18)
IMM GRANULOCYTES # BLD AUTO: 0.05 K/UL (ref 0–0.04)
IMM GRANULOCYTES NFR BLD AUTO: 0.4 % (ref 0–0.5)
LYMPHOCYTES # BLD AUTO: 1 K/UL (ref 1–4.8)
LYMPHOCYTES NFR BLD: 8.6 % (ref 18–48)
MAGNESIUM SERPL-MCNC: 1.6 MG/DL (ref 1.6–2.6)
MCH RBC QN AUTO: 28.4 PG (ref 27–31)
MCHC RBC AUTO-ENTMCNC: 32.8 G/DL (ref 32–36)
MCV RBC AUTO: 87 FL (ref 82–98)
MONOCYTES # BLD AUTO: 0.7 K/UL (ref 0.3–1)
MONOCYTES NFR BLD: 5.8 % (ref 4–15)
NEUTROPHILS # BLD AUTO: 9.3 K/UL (ref 1.8–7.7)
NEUTROPHILS NFR BLD: 80.5 % (ref 38–73)
NRBC BLD-RTO: 0 /100 WBC
PHOSPHATE SERPL-MCNC: 3 MG/DL (ref 2.7–4.5)
PLATELET # BLD AUTO: 258 K/UL (ref 150–450)
PMV BLD AUTO: 9.7 FL (ref 9.2–12.9)
POCT GLUCOSE: 117 MG/DL (ref 70–110)
POCT GLUCOSE: 159 MG/DL (ref 70–110)
POCT GLUCOSE: 213 MG/DL (ref 70–110)
POCT GLUCOSE: 215 MG/DL (ref 70–110)
POCT GLUCOSE: 26 MG/DL (ref 70–110)
POCT GLUCOSE: 27 MG/DL (ref 70–110)
POCT GLUCOSE: 293 MG/DL (ref 70–110)
POTASSIUM SERPL-SCNC: 3.2 MMOL/L (ref 3.5–5.1)
PROT SERPL-MCNC: 5.8 G/DL (ref 6–8.4)
RBC # BLD AUTO: 4.01 M/UL (ref 4.6–6.2)
SODIUM SERPL-SCNC: 142 MMOL/L (ref 136–145)
WBC # BLD AUTO: 11.51 K/UL (ref 3.9–12.7)

## 2022-02-22 PROCEDURE — 80053 COMPREHEN METABOLIC PANEL: CPT | Mod: HCNC | Performed by: STUDENT IN AN ORGANIZED HEALTH CARE EDUCATION/TRAINING PROGRAM

## 2022-02-22 PROCEDURE — 25000003 PHARM REV CODE 250: Mod: HCNC | Performed by: INTERNAL MEDICINE

## 2022-02-22 PROCEDURE — 99232 PR SUBSEQUENT HOSPITAL CARE,LEVL II: ICD-10-PCS | Mod: HCNC,,, | Performed by: STUDENT IN AN ORGANIZED HEALTH CARE EDUCATION/TRAINING PROGRAM

## 2022-02-22 PROCEDURE — 25000003 PHARM REV CODE 250: Mod: HCNC | Performed by: STUDENT IN AN ORGANIZED HEALTH CARE EDUCATION/TRAINING PROGRAM

## 2022-02-22 PROCEDURE — 99900035 HC TECH TIME PER 15 MIN (STAT): Mod: HCNC

## 2022-02-22 PROCEDURE — 94640 AIRWAY INHALATION TREATMENT: CPT | Mod: HCNC

## 2022-02-22 PROCEDURE — 12000002 HC ACUTE/MED SURGE SEMI-PRIVATE ROOM: Mod: HCNC

## 2022-02-22 PROCEDURE — 83735 ASSAY OF MAGNESIUM: CPT | Mod: HCNC | Performed by: STUDENT IN AN ORGANIZED HEALTH CARE EDUCATION/TRAINING PROGRAM

## 2022-02-22 PROCEDURE — 36415 COLL VENOUS BLD VENIPUNCTURE: CPT | Mod: HCNC | Performed by: STUDENT IN AN ORGANIZED HEALTH CARE EDUCATION/TRAINING PROGRAM

## 2022-02-22 PROCEDURE — 99232 SBSQ HOSP IP/OBS MODERATE 35: CPT | Mod: HCNC,,, | Performed by: STUDENT IN AN ORGANIZED HEALTH CARE EDUCATION/TRAINING PROGRAM

## 2022-02-22 PROCEDURE — 63600175 PHARM REV CODE 636 W HCPCS: Mod: HCNC | Performed by: INTERNAL MEDICINE

## 2022-02-22 PROCEDURE — 85025 COMPLETE CBC W/AUTO DIFF WBC: CPT | Mod: HCNC | Performed by: STUDENT IN AN ORGANIZED HEALTH CARE EDUCATION/TRAINING PROGRAM

## 2022-02-22 PROCEDURE — 97112 NEUROMUSCULAR REEDUCATION: CPT | Mod: HCNC

## 2022-02-22 PROCEDURE — 97535 SELF CARE MNGMENT TRAINING: CPT | Mod: HCNC

## 2022-02-22 PROCEDURE — 97530 THERAPEUTIC ACTIVITIES: CPT | Mod: HCNC

## 2022-02-22 PROCEDURE — 84100 ASSAY OF PHOSPHORUS: CPT | Mod: HCNC | Performed by: STUDENT IN AN ORGANIZED HEALTH CARE EDUCATION/TRAINING PROGRAM

## 2022-02-22 PROCEDURE — 27000221 HC OXYGEN, UP TO 24 HOURS: Mod: HCNC

## 2022-02-22 PROCEDURE — 94761 N-INVAS EAR/PLS OXIMETRY MLT: CPT | Mod: HCNC

## 2022-02-22 PROCEDURE — 63600175 PHARM REV CODE 636 W HCPCS: Mod: HCNC | Performed by: STUDENT IN AN ORGANIZED HEALTH CARE EDUCATION/TRAINING PROGRAM

## 2022-02-22 PROCEDURE — 27000207 HC ISOLATION: Mod: HCNC

## 2022-02-22 RX ORDER — INSULIN ASPART 100 [IU]/ML
5 INJECTION, SOLUTION INTRAVENOUS; SUBCUTANEOUS
Status: DISCONTINUED | OUTPATIENT
Start: 2022-02-22 | End: 2022-02-27

## 2022-02-22 RX ORDER — METOPROLOL TARTRATE 25 MG/1
25 TABLET, FILM COATED ORAL 2 TIMES DAILY
Status: DISCONTINUED | OUTPATIENT
Start: 2022-02-22 | End: 2022-03-05

## 2022-02-22 RX ORDER — ONDANSETRON 4 MG/1
4 TABLET, ORALLY DISINTEGRATING ORAL EVERY 6 HOURS PRN
Status: DISCONTINUED | OUTPATIENT
Start: 2022-02-22 | End: 2022-03-10 | Stop reason: HOSPADM

## 2022-02-22 RX ORDER — ACETAMINOPHEN 325 MG/1
650 TABLET ORAL EVERY 6 HOURS PRN
Status: DISCONTINUED | OUTPATIENT
Start: 2022-02-22 | End: 2022-03-10 | Stop reason: HOSPADM

## 2022-02-22 RX ADMIN — OXYCODONE HYDROCHLORIDE AND ACETAMINOPHEN 500 MG: 500 TABLET ORAL at 10:02

## 2022-02-22 RX ADMIN — MUPIROCIN: 20 OINTMENT TOPICAL at 10:02

## 2022-02-22 RX ADMIN — METOPROLOL TARTRATE 25 MG: 25 TABLET, FILM COATED ORAL at 10:02

## 2022-02-22 RX ADMIN — MULTIPLE VITAMINS W/ MINERALS TAB 1 TABLET: TAB at 08:02

## 2022-02-22 RX ADMIN — ACETAMINOPHEN 1000 MG: 500 TABLET ORAL at 05:02

## 2022-02-22 RX ADMIN — INSULIN ASPART 2 UNITS: 100 INJECTION, SOLUTION INTRAVENOUS; SUBCUTANEOUS at 08:02

## 2022-02-22 RX ADMIN — ACETAMINOPHEN 650 MG: 325 TABLET ORAL at 07:02

## 2022-02-22 RX ADMIN — APIXABAN 5 MG: 5 TABLET, FILM COATED ORAL at 08:02

## 2022-02-22 RX ADMIN — OXYCODONE HYDROCHLORIDE AND ACETAMINOPHEN 500 MG: 500 TABLET ORAL at 08:02

## 2022-02-22 RX ADMIN — ATORVASTATIN CALCIUM 40 MG: 20 TABLET, FILM COATED ORAL at 08:02

## 2022-02-22 RX ADMIN — ALBUTEROL SULFATE 2 PUFF: 108 INHALANT RESPIRATORY (INHALATION) at 12:02

## 2022-02-22 RX ADMIN — MUPIROCIN: 20 OINTMENT TOPICAL at 08:02

## 2022-02-22 RX ADMIN — APIXABAN 5 MG: 5 TABLET, FILM COATED ORAL at 10:02

## 2022-02-22 RX ADMIN — METOPROLOL TARTRATE 25 MG: 25 TABLET, FILM COATED ORAL at 08:02

## 2022-02-22 RX ADMIN — PIPERACILLIN AND TAZOBACTAM 4.5 G: 4; .5 INJECTION, POWDER, LYOPHILIZED, FOR SOLUTION INTRAVENOUS; PARENTERAL at 05:02

## 2022-02-22 RX ADMIN — ONDANSETRON 4 MG: 4 TABLET, ORALLY DISINTEGRATING ORAL at 10:02

## 2022-02-22 RX ADMIN — ALBUTEROL SULFATE 2 PUFF: 108 INHALANT RESPIRATORY (INHALATION) at 07:02

## 2022-02-22 RX ADMIN — PIPERACILLIN AND TAZOBACTAM 4.5 G: 4; .5 INJECTION, POWDER, LYOPHILIZED, FOR SOLUTION INTRAVENOUS; PARENTERAL at 10:02

## 2022-02-22 RX ADMIN — INSULIN ASPART 6 UNITS: 100 INJECTION, SOLUTION INTRAVENOUS; SUBCUTANEOUS at 05:02

## 2022-02-22 RX ADMIN — LACOSAMIDE 100 MG: 100 TABLET, FILM COATED ORAL at 08:02

## 2022-02-22 RX ADMIN — DOXYCYCLINE HYCLATE 100 MG: 100 TABLET, COATED ORAL at 08:02

## 2022-02-22 RX ADMIN — PIPERACILLIN AND TAZOBACTAM 4.5 G: 4; .5 INJECTION, POWDER, LYOPHILIZED, FOR SOLUTION INTRAVENOUS; PARENTERAL at 01:02

## 2022-02-22 RX ADMIN — DEXTROSE 250 ML: 10 SOLUTION INTRAVENOUS at 05:02

## 2022-02-22 RX ADMIN — CLOPIDOGREL 75 MG: 75 TABLET, FILM COATED ORAL at 08:02

## 2022-02-22 RX ADMIN — PANTOPRAZOLE SODIUM 40 MG: 40 TABLET, DELAYED RELEASE ORAL at 08:02

## 2022-02-22 RX ADMIN — REMDESIVIR 100 MG: 100 INJECTION, POWDER, LYOPHILIZED, FOR SOLUTION INTRAVENOUS at 10:02

## 2022-02-22 RX ADMIN — ASPIRIN 81 MG: 81 TABLET, COATED ORAL at 08:02

## 2022-02-22 RX ADMIN — LACOSAMIDE 100 MG: 100 TABLET, FILM COATED ORAL at 10:02

## 2022-02-22 RX ADMIN — DOXYCYCLINE HYCLATE 100 MG: 100 TABLET, COATED ORAL at 10:02

## 2022-02-22 RX ADMIN — AMIODARONE HYDROCHLORIDE 200 MG: 200 TABLET ORAL at 08:02

## 2022-02-22 NOTE — PT/OT/SLP PROGRESS
Physical Therapy Co-Treatment    Patient Name:  Kamar Muñoz   MRN:  [id number]    Recommendations:     Discharge Recommendations:  rehabilitation facility   Discharge Equipment Recommendations: none   Barriers to discharge: Decreased caregiver support    Assessment:     Kamar Muñoz is a 76 y.o. male admitted with a medical diagnosis of Encephalopathy, metabolic.  He presents with the following impairments/functional limitations:  weakness, impaired balance, decreased safety awareness, impaired endurance, impaired skin, pain, impaired cardiopulmonary response to activity, impaired self care skills, impaired functional mobilty, gait instability, decreased lower extremity function, decreased upper extremity function. Pt progressing functional mobility since initial PT evaluation. Pt able to achieve full standing and complete steps at EOB this date. Residual L-sided weakness noted 2* recent CVA with increased difficulty stepping L>R. Impaired endurance apparent with progressive weakness and fatigue noted. Delayed processing and motor planning further contributing to mobility limitations. Pt would continue to benefit from skilled acute PT in order to address current deficits and progress functional mobility.     Rehab Prognosis: Good; patient would benefit from acute skilled PT services to address these deficits and reach maximum level of function.    Recent Surgery: * No surgery found *       Plan:     During this hospitalization, patient to be seen 4 x/week to address the identified rehab impairments via gait training, therapeutic activities, therapeutic exercises, neuromuscular re-education and progress toward the following goals:    · Plan of Care Expires:  03/22/22    Subjective     Chief Complaint: pain at sacrum 2* wound  Patient/Family Comments/goals: Pt requesting to ambulate to bathroom. Edu provided on importance of maintaining safety with mobility. BSC ordered  Pain/Comfort:  · Pain Rating 1:  (did  not rate)  · Location 1: sacral spine (2* wound)  · Pain Addressed 1: Reposition, Distraction, Cessation of Activity      Objective:     Communicated with RN prior to session.  Patient found left sidelying with telemetry, pulse ox (continuous), lopez catheter, peripheral IV, blood pressure cuff, bed alarm upon PT entry to room.     General Precautions: Standard, fall, droplet, contact, airborne   Orthopedic Precautions:N/A   Braces: N/A  Respiratory Status: Nasal cannula, flow 1 L/min    SpO2 in 90s when consistent waveform present      Functional Mobility:  · Bed Mobility:     · Rolling R: min assist using bed rail   · Rolling L: min assist using bed rail   · Supine to Sit: maximal assistance using bed rail  · Max cues provided for sequencing and technique   · Sit to Supine: minimum assistance  · Transfers:     · Sit to Stand:  minimum assistance with hand-held assist, x2 reps from EOB  · Cues for hand placement and transfer technique   · Gait: ~2 ft. x2 lateral steps at EOB in B directions with minAx2 and B HHA  · demo'd decreased step length, decreased floor clearance, impaired weight-shifting ability, and instability   · Increased instability noted with L lateral steps > R   · Cues provided for sequencing, appropriate weight-shifts, and safe technique  · Increasing fatigue and instability noted towards end of trial   · Balance:   · sitting EOB with SBA x~20 min while completing self-care tasks  · Requiring increased time and repeated cues for completion of tasks at hand with delayed processing and motor planning noted       AM-PAC 6 CLICK MOBILITY  Turning over in bed (including adjusting bedclothes, sheets and blankets)?: 3  Sitting down on and standing up from a chair with arms (e.g., wheelchair, bedside commode, etc.): 3  Moving from lying on back to sitting on the side of the bed?: 2  Moving to and from a bed to a chair (including a wheelchair)?: 2  Need to walk in hospital room?: 2  Climbing 3-5 steps with  a railing?: 1  Basic Mobility Total Score: 13       Therapeutic Activities and Exercises:  Pt educated on role of PT and PT POC. Pt verbalized understanding.   Daily orientation provided.   Increased time provided to complete all mobility and functional tasks with delayed processing and responses noted throughout.   BSC ordered for use during hospital admission.  Therapeutic positioning provided to assist with off-loading sacral spine 2* wound, pillow between knees to protect bony prominences, RUE abducted and elevated     Patient left right sidelying with all lines intact, call button in reach, bed alarm on and RN notified..    GOALS:   Multidisciplinary Problems     Physical Therapy Goals        Problem: Physical Therapy Goal    Goal Priority Disciplines Outcome Goal Variances Interventions   Physical Therapy Goal     PT, PT/OT Ongoing, Progressing     Description: Goals to be met by: 3/10/22     Patient will increase functional independence with mobility by performin. Supine to sit with MInimal Assistance  2. Sit to supine with MInimal Assistance - met   2a. Sit to supine with SBA   3. Sit to stand transfer with Minimal Assistance - met   3a. Sit to stand transfer SBA  4. Gait  x 25 feet with Minimal Assistance using LRAD, if needed.   5. Sitting at edge of bed x10 minutes with Modified [place]  6. Stand for 3 minutes with Minimal Assistance using LRAD, if needed  7. Lower extremity exercise program x10 reps per handout, with independence                     Time Tracking:     PT Received On: 22  PT Start Time: 1006     PT Stop Time: 1039  PT Total Time (min): 33 min     Billable Minutes: Therapeutic Activity 15 and Neuromuscular Re-education 18   (co-treatment performed this date due to need for 2 skilled therapists in order to ensure pt safety, accomodate for pt activity tolerance, and maximize functional potential in the setting of COVID-19 diagnosis)     Treatment Type:  Treatment  PT/PTA: PT     PTA Visit Number: 0     02/22/2022

## 2022-02-22 NOTE — CARE UPDATE
02/21/22 2033   Patient Assessment/Suction   Level of Consciousness (AVPU) alert       EKG complete and results page given to nurse

## 2022-02-22 NOTE — CONSULTS
Inpatient consult to Physical Medicine Rehab  Consult performed by: Shahida Wellington NP  Consult ordered by: Sky Salcedo MD  Reason for consult: Assess rehab needs      Reviewed patient history and rehab team following. Covid positive at this time. Will review with collaborating physician.    HIRAL Tilley, FNP-C  Physical Medicine & Rehabilitation   02/22/2022

## 2022-02-22 NOTE — PROGRESS NOTES
[facility] ([address] Medicine  Progress Note    Patient Name: Kamar Muñoz  MRN: [id number]  Patient Class: IP- Inpatient   Admission Date: 2/19/2022  Length of Stay: 3 days  Attending Physician: Imelda Rondon MD  Primary Care Provider: Basim Guerrero MD        Subjective:     Principal Problem:Encephalopathy, metabolic        HPI:  Kamar Muñoz is a 76 year old male with past medical history of CAD s/p 2 LAUREN in RCA (Jan 2022), HTN, HLD, p. A. 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 History was not obtained from patient due to encephalopathy. Patient's daughter at bedside reports the patient was recently in the ED on 2/17 for a mechanical fall after being discharged from rehab the same day. CTH and cervical spine revealed  evolving late subacute infarct involving the right frontal lobe with questionable petechial hemorrhage. Vascular neurology evaluated the patient and cleared him for discharge from without any new interventions. He was also tested positive for COVID-19 and was discharged yesterday from the ED. He subsequently received one dose of sotrovimab infusion for COVID and was in a normal state of health until this morning when his daughter found him lethargic and barely responsive prompting her to bring him to the ED. She reports the patient had no complaints prior to developing his AMS. Of note, the patient was recently admitted to [facility] from 01/25 through 02/13 for AMS concerning for CVA, however he was treated for  metabolic encephalopathy 2/2 to DKA/HHS, sepsis and BRENDAN.      Upon arrival to the ED, he was placed on 6L NC, normotensive and tachycardic. Lactic 11.5, WBC 17.8, Glucose 1109, pH 7.17, Co2 15.6 HCO3 <5, AG unable to calculate. sCr 3.1. CXR with intersitial opacities. He received ~4L of IVF, vancomycin, zosyn, initiated on insulin shifted for hyperkalemia and calcium for cardioprotection. ECG without noticeable ischemic changes. CTH without new changes- discussed findings with radiology. Patient was admitted to the MICU for further management.        Overview/Hospital Course:  MICU Course:  Placed on remdesivir and broad spectrum IV abx in addition to insulin per DKA protocol. In MICU: Weaned supp O2 to room air; Transitioned to subq insulin; Improvement of AMS. BRENDAN improving gradually. Pt had discussion w/ family in MICU and transitioned from full code to DNR/DNI.  Step down to  initiated 2/21.  Pt w/ episode of CP and hypoglycemia upon stepdown. Ischemic work-up largely unremarkable with trop down trending from admission. Detemir dosing adjusted from BID to qhs.       Interval History:   Pt w/ episode of CP and symptomatic hypoglycemia overnight. Ischemic work-up unremarkable; trop down trending since admission. Hypoglycemia (26) improved s/p dextrose bolus w/ symptomatic improvement (BG up to 159).    This AM, Pt denies any CP or diaphoresis. He does not like the food and would prefer shakes. He notes robust diet when at home, however.     Review of Systems   Constitutional:  Positive for activity change and fatigue. Negative for chills, diaphoresis and fever.   HENT:  Negative for congestion, postnasal drip, rhinorrhea and trouble swallowing.    Eyes: Negative.  Negative for visual disturbance.   Respiratory:  Negative for cough, shortness of breath and wheezing.    Cardiovascular:  Negative for chest pain, palpitations and leg swelling.   Gastrointestinal:  Negative for abdominal distention, abdominal pain,  constipation, diarrhea, nausea and vomiting.   Endocrine: Negative.    Genitourinary: Negative.  Negative for dysuria.   Musculoskeletal: Negative.  Negative for arthralgias and myalgias.   Skin:  Negative for rash.   Allergic/Immunologic: Negative.    Neurological:  Negative for dizziness, facial asymmetry, light-headedness and headaches.   Psychiatric/Behavioral:  Negative for decreased concentration.      Objective:     Vital Signs (Most Recent):  Temp: 98.3 °F (36.8 °C) (02/22/22 1144)  Pulse: 72 (02/22/22 1252)  Resp: 18 (02/22/22 1252)  BP: 119/73 (02/22/22 1144)  SpO2: 97 % (02/22/22 1252) Vital Signs (24h Range):  Temp:  [97.4 °F (36.3 °C)-98.5 °F (36.9 °C)] 98.3 °F (36.8 °C)  Pulse:  [64-90] 72  Resp:  [16-20] 18  SpO2:  [92 %-100 %] 97 %  BP: (114-141)/() 119/73     Weight: 76.4 kg (168 lb 6.9 oz)  Body mass index is 21.63 kg/m².    Intake/Output Summary (Last 24 hours) at 2/22/2022 1401  Last data filed at 2/22/2022 1306  Gross per 24 hour   Intake 610 ml   Output 551 ml   Net 59 ml      Physical Exam  Vitals and nursing note reviewed.   Constitutional:       General: He is not in acute distress.     Appearance: He is not toxic-appearing.   HENT:      Head: Normocephalic and atraumatic.      Right Ear: External ear normal.      Left Ear: External ear normal.      Nose: Nose normal. No congestion.      Mouth/Throat:      Mouth: Mucous membranes are moist.   Eyes:      General: No scleral icterus.        Right eye: No discharge.         Left eye: No discharge.   Cardiovascular:      Rate and Rhythm: Normal rate and regular rhythm.      Pulses: Normal pulses.      Heart sounds: Normal heart sounds.   Pulmonary:      Effort: Pulmonary effort is normal. No respiratory distress.      Breath sounds: Normal breath sounds. No wheezing, rhonchi or rales.   Abdominal:      General: Abdomen is flat. There is no distension.      Palpations: Abdomen is soft.      Tenderness: There is no abdominal tenderness.    Musculoskeletal:      Cervical back: Normal range of motion and neck supple.      Right lower leg: No edema.      Left lower leg: No edema.   Skin:     General: Skin is warm.   Neurological:      Mental Status: He is alert. Mental status is at baseline.   Psychiatric:         Behavior: Behavior normal.       Significant Labs: All pertinent labs within the past 24 hours have been reviewed.    Recent Results (from the past 24 hour(s))   POCT glucose    Collection Time: 02/21/22  2:39 PM   Result Value Ref Range    POCT Glucose 190 (H) 70 - 110 mg/dL   POCT glucose    Collection Time: 02/21/22  5:03 PM   Result Value Ref Range    POCT Glucose 95 70 - 110 mg/dL   Lactate Dehydrogenase    Collection Time: 02/21/22  8:22 PM   Result Value Ref Range     (H) 110 - 260 U/L   Ferritin    Collection Time: 02/21/22  8:22 PM   Result Value Ref Range    Ferritin 481 (H) 20.0 - 300.0 ng/mL   D-Dimer, Quantitative    Collection Time: 02/21/22  8:22 PM   Result Value Ref Range    D-Dimer 0.50 (H) <0.50 mg/L FEU   Troponin I    Collection Time: 02/21/22  8:22 PM   Result Value Ref Range    Troponin I 0.470 (H) 0.000 - 0.026 ng/mL   POCT glucose    Collection Time: 02/21/22  8:50 PM   Result Value Ref Range    POCT Glucose 59 (L) 70 - 110 mg/dL   POCT glucose    Collection Time: 02/21/22  8:52 PM   Result Value Ref Range    POCT Glucose 109 70 - 110 mg/dL   Comprehensive metabolic panel    Collection Time: 02/22/22  4:16 AM   Result Value Ref Range    Sodium 142 136 - 145 mmol/L    Potassium 3.2 (L) 3.5 - 5.1 mmol/L    Chloride 108 95 - 110 mmol/L    CO2 24 23 - 29 mmol/L    Glucose 24 (LL) 70 - 110 mg/dL    BUN 24 (H) 8 - 23 mg/dL    Creatinine 1.2 0.5 - 1.4 mg/dL    Calcium 8.6 (L) 8.7 - 10.5 mg/dL    Total Protein 5.8 (L) 6.0 - 8.4 g/dL    Albumin 2.0 (L) 3.5 - 5.2 g/dL    Total Bilirubin 0.4 0.1 - 1.0 mg/dL    Alkaline Phosphatase 154 (H) 55 - 135 U/L    AST 28 10 - 40 U/L    ALT 12 10 - 44 U/L    Anion Gap 10 8 - 16  mmol/L    eGFR if African American >60.0 >60 mL/min/1.73 m^2    eGFR if non  57.6 (A) >60 mL/min/1.73 m^2   Magnesium    Collection Time: 02/22/22  4:16 AM   Result Value Ref Range    Magnesium 1.6 1.6 - 2.6 mg/dL   CBC auto differential    Collection Time: 02/22/22  4:16 AM   Result Value Ref Range    WBC 11.51 3.90 - 12.70 K/uL    RBC 4.01 (L) 4.60 - 6.20 M/uL    Hemoglobin 11.4 (L) 14.0 - 18.0 g/dL    Hematocrit 34.8 (L) 40.0 - 54.0 %    MCV 87 82 - 98 fL    MCH 28.4 27.0 - 31.0 pg    MCHC 32.8 32.0 - 36.0 g/dL    RDW 14.3 11.5 - 14.5 %    Platelets 258 150 - 450 K/uL    MPV 9.7 9.2 - 12.9 fL    Immature Granulocytes 0.4 0.0 - 0.5 %    Gran # (ANC) 9.3 (H) 1.8 - 7.7 K/uL    Immature Grans (Abs) 0.05 (H) 0.00 - 0.04 K/uL    Lymph # 1.0 1.0 - 4.8 K/uL    Mono # 0.7 0.3 - 1.0 K/uL    Eos # 0.5 0.0 - 0.5 K/uL    Baso # 0.05 0.00 - 0.20 K/uL    nRBC 0 0 /100 WBC    Gran % 80.5 (H) 38.0 - 73.0 %    Lymph % 8.6 (L) 18.0 - 48.0 %    Mono % 5.8 4.0 - 15.0 %    Eosinophil % 4.3 0.0 - 8.0 %    Basophil % 0.4 0.0 - 1.9 %    Differential Method Automated    Phosphorus    Collection Time: 02/22/22  4:16 AM   Result Value Ref Range    Phosphorus 3.0 2.7 - 4.5 mg/dL   POCT glucose    Collection Time: 02/22/22  5:14 AM   Result Value Ref Range    POCT Glucose 26 (LL) 70 - 110 mg/dL   POCT glucose    Collection Time: 02/22/22  5:16 AM   Result Value Ref Range    POCT Glucose 27 (LL) 70 - 110 mg/dL   POCT glucose    Collection Time: 02/22/22  5:43 AM   Result Value Ref Range    POCT Glucose 159 (H) 70 - 110 mg/dL   POCT glucose    Collection Time: 02/22/22  8:15 AM   Result Value Ref Range    POCT Glucose 117 (H) 70 - 110 mg/dL   POCT glucose    Collection Time: 02/22/22 11:51 AM   Result Value Ref Range    POCT Glucose 215 (H) 70 - 110 mg/dL         Significant Imaging: I have reviewed all pertinent imaging results/findings within the past 24 hours.    Imaging Results              X-Ray Chest AP Portable (Final  result)  Result time 02/19/22 20:32:55      Final result by Azeem Cao MD (02/19/22 20:32:55)                   Impression:      Diffuse interstitial opacities.      Electronically signed by: Azeem Cao MD  Date:    02/19/2022  Time:    20:32               Narrative:    EXAMINATION:  XR CHEST AP PORTABLE    CLINICAL HISTORY:  Sepsis;    TECHNIQUE:  Single frontal view of the chest was performed.    COMPARISON:  01/25/2022.    FINDINGS:  Monitoring EKG leads are present.  There are postoperative changes in the base of the neck.    The trachea is unremarkable.  The cardiomediastinal silhouette is enlarged.  There is no evidence of free air beneath the hemidiaphragms there are no pleural effusions there is no evidence of a pneumothorax.  There is no evidence of pneumomediastinum.  There are diffuse interstitial opacities.  The osseous structures demonstrate degenerative changes.                                       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      Electronically signed by: Carli Coulter  Date:    02/19/2022  Time:    20:21               Narrative:    EXAMINATION:  CT OF THE HEAD WITHOUT    CLINICAL HISTORY:  Mental status change, unknown cause;Recent subacute infarction with possible area of punctate hemorrhage, now confused;    TECHNIQUE:  5 mm unenhanced axial images were obtained from the skull base to the vertex.    COMPARISON:  02/17/2022    FINDINGS:  Stable cerebral atrophy and chronic small vessel ischemic changes are present.  There are areas of infarct involving the right frontal and left cerebellar hemisphere.  There has been no hemorrhagic conversion.  Mild right frontal laminar necrosis is seen.  There is no acute intracranial hemorrhage, territorial infarct or mass effect,  or midline shift. In the visualized paranasal sinuses, there is mild left maxillary sinus mucoperiosteal thickening.                                          Assessment/Plan:      * Encephalopathy, metabolic  In the setting of DKA and sepsis upon admission.  Admit CT Head without any concerning new findings- no new infarct as discussed with radiology    Resolved.      Diabetic ketoacidosis with coma associated with type 2 diabetes mellitus  Patient with uncontrolled DM presenting with metabolic encephalopathy in the setting of DKA with coma. Suspect patient developed DKA in the setting of sepsis/ COVID-10   Glucose 1109, pH 7.17, Co2 15.6 HCO3 <5, AG unable to calculate    DKA protocol completed and DKA resolved in MICU and Pt stepped down on subq insulin.    Hypoglycemia episode upon step-down, detemir adjusted from BID to qhs per home long acting insulin  Continue aspart TIDWM  Diabetic diet  POCT BGx4  Cotinue to titrate short and long acting insulin needs as indicated to in-pt hospital goal 140-180    Acute hypoxemic respiratory failure  In setting of COVID infection. Resolved upon step-down from MICU. ORA.   maintain SpO2 88-92% given hx of COPD      Paroxysmal atrial fibrillation  History of paroxysmal atrial fibrillation on home  Metoprolol XL 50 mg daily, diltiazem  mg daily and amiodarone 200 mg daily.  - Continue po apixaban  - Continue amiodarone  - metoprolol and diltiazem held in MICU  in the setting of sepsis.   -resume BB and CBB as clinically indicated and tolerated per BP          COVID-19  Viral Pneumonia due to COVID-19  - COVID-19 testing:   -Patient is identified as High risk for severe complications of COVID 19 based on COVID risk score of 8   Initiate standard COVID protocols; COVID-19 testing Collection Date: 8/9/2021 Collection Time:   3:41 PM ,Infection Control notification  and isolation- respiratory, contact and droplet per protocol  COVID vaccinated with booster.   Received 1 dose  of sotrovimab infusion 02/18/2022     - Diagnostics: Trend LDH, CRP, Ferritin, D-dimer Q48hrs if stable, more frequently if patient decompensating.     Lymphopenia, hyponatremia, hyperferritinemia, elevated troponin, elevated d-dimer, age, and medical comorbidities are significant predictors of poor clinical outcome     - Management: Per [facility] COVID Treatment Protocol (4/15/20)       - Monitoring:          - Telemetry & Continuous Pulse Oximetry       - Targeted therapy Remdesivir, 200mg IV x1, followed by 100mg IV daily x5 days total,  - Holding dexamethasone PO/IV 6mg daily x10 days in the setting of DKA/HHS.-  -Weaning 02 as tolerated  - Anticoagulation, Patient admitted to critical care up initial presentation and on triple therapy- Will continue home apixaban dose anticoagulation       - Antibiotics:  - if indications, CXR findings, elevated procal. See protocol for alternatives.   - Discontinue early if low concern for bacterial co-infection          - Initiated on IV vanc, zosyn and doxycycline (unable to use azithromycin due to prolong QTc)     - Nutrition:           - Multivitamin PO daily          - Add Boost supplement          - Vitamin D 1000IU daily if deficient          - Ascorbic acid 500mg PO bid    - Supportive Care:          - acetaminophen 650mg PO Q6hr PRN fever/headache          - loperamide PRN viral diarrhea    Severe sepsis  Upon admission:  Organ dysfunction indicated by Acute kidney injury, Encephalopathy  and Acute respiratory failure  Source- Unclear. CXR with diffuse interstitial opacities, however no consolidation noted. UA with pyuria, without elevated leuks. Blood cultures pending. Possibly due to sacral wound, although doesn't look grossly infected.     Started on broad spectrum abx at admission with vanc/zosyn/doxy.    - vancomycin discontinued today as no MRSA has isolated.  No infectious source found but leukocytosis persists.  Consider de-escalation further based on continued  improvement.  Day 3 abx today.    F/u Urine and blood cultures  - Wound care consulted for sacral decubitus wound.   -lactic acid overall trend improved    Palliative care status  Per MICU: patient wishes to be DNR/DNI and family agrees. Still want to continue full medical care       Focal seizures  Continue home lancosamide     BRENDAN (acute kidney injury)  sCr 3.1, baseline 1.1-1.3. Likely prerenal in the setting of DKA    Improving with treatment of DKA  - Avoid nephrotoxins, renally dose meds    Embolic stroke involving right middle cerebral artery  Hx of CVA in Jan 2022. CT Head with evolving infarcts in right frontal and left cerebellar hemispheres. No concern for acute stroke per discussion with radiology.      - Continue home antiplatelets, statin and eliquis       Coronary artery disease involving native coronary artery of native heart with unstable angina pectoris  Hx of CAD s/p placement of 2 LAUREN in RCA in 01/09/2022.      - Continue home ASA, plavix and statin    Pulmonary nodules  Has stable bilateral  pulmonary masses which could be malignancy per outpatient pulmonology notes. No pathology report as none of the nodules appeared to be safe for biopsy given high risk of PTX is high with many adjacent bulla.         Chronic pulmonary heart disease  Follows up with Pulm clinic in [place]. Last seen in December and was evaluated for pulmonary masses. Deemed to have mild COPD per notes. Not on any maintenance therapy.    - Continue albuterol inhaler     Type 2 diabetes mellitus with hyperglycemia, with long-term current use of insulin  See DKA    Hypertension associated with diabetes  resume home antihypertensives as tolerated  Hold losartan in setting of BRENDAN  See pAF      VTE Risk Mitigation (From admission, onward)         Ordered     apixaban tablet 5 mg  2 times daily         02/19/22 2202     IP VTE HIGH RISK PATIENT  Once         02/19/22 2144     Place sequential compression device  Until discontinued          02/19/22 2144                Discharge Planning   ALLIE: 2/24/2022     Code Status: DNR   Is the patient medically ready for discharge?: No    Reason for patient still in hospital (select all that apply): Patient trending condition, PT / OT recommendations and Pending disposition  Discharge Plan A: Rehab   Discharge Delays: None known at this time              Imelda Rondon MD  Department of Hospital Medicine   [facility] - Intensive Care ([place])

## 2022-02-22 NOTE — NURSING
"0520: This nurse called to pt room with reports from pt stating " he does not feel so good", pt diaphoretic and awake but weak and oriented to self, time and place, BG checked reading 26 and 27, MD paged, 10% dextrose bolus administered, will recheck glucose.     0540: bolus completed, upon rechecking , pt stating "I feel better than before", MD notified.   "

## 2022-02-22 NOTE — SUBJECTIVE & OBJECTIVE
Interval History:   Pt w/ episode of CP and symptomatic hypoglycemia overnight. Ischemic work-up unremarkable; trop down trending since admission. Hypoglycemia (26) improved s/p dextrose bolus w/ symptomatic improvement (BG up to 159).    This AM, Pt denies any CP or diaphoresis. He does not like the food and would prefer shakes. He notes robust diet when at home, however.     Review of Systems   Constitutional:  Positive for activity change and fatigue. Negative for chills, diaphoresis and fever.   HENT:  Negative for congestion, postnasal drip, rhinorrhea and trouble swallowing.    Eyes: Negative.  Negative for visual disturbance.   Respiratory:  Negative for cough, shortness of breath and wheezing.    Cardiovascular:  Negative for chest pain, palpitations and leg swelling.   Gastrointestinal:  Negative for abdominal distention, abdominal pain, constipation, diarrhea, nausea and vomiting.   Endocrine: Negative.    Genitourinary: Negative.  Negative for dysuria.   Musculoskeletal: Negative.  Negative for arthralgias and myalgias.   Skin:  Negative for rash.   Allergic/Immunologic: Negative.    Neurological:  Negative for dizziness, facial asymmetry, light-headedness and headaches.   Psychiatric/Behavioral:  Negative for decreased concentration.      Objective:     Vital Signs (Most Recent):  Temp: 98.3 °F (36.8 °C) (02/22/22 1144)  Pulse: 72 (02/22/22 1252)  Resp: 18 (02/22/22 1252)  BP: 119/73 (02/22/22 1144)  SpO2: 97 % (02/22/22 1252) Vital Signs (24h Range):  Temp:  [97.4 °F (36.3 °C)-98.5 °F (36.9 °C)] 98.3 °F (36.8 °C)  Pulse:  [64-90] 72  Resp:  [16-20] 18  SpO2:  [92 %-100 %] 97 %  BP: (114-141)/() 119/73     Weight: 76.4 kg (168 lb 6.9 oz)  Body mass index is 21.63 kg/m².    Intake/Output Summary (Last 24 hours) at 2/22/2022 1401  Last data filed at 2/22/2022 1306  Gross per 24 hour   Intake 610 ml   Output 551 ml   Net 59 ml      Physical Exam  Vitals and nursing note reviewed.   Constitutional:        General: He is not in acute distress.     Appearance: He is not toxic-appearing.   HENT:      Head: Normocephalic and atraumatic.      Right Ear: External ear normal.      Left Ear: External ear normal.      Nose: Nose normal. No congestion.      Mouth/Throat:      Mouth: Mucous membranes are moist.   Eyes:      General: No scleral icterus.        Right eye: No discharge.         Left eye: No discharge.   Cardiovascular:      Rate and Rhythm: Normal rate and regular rhythm.      Pulses: Normal pulses.      Heart sounds: Normal heart sounds.   Pulmonary:      Effort: Pulmonary effort is normal. No respiratory distress.      Breath sounds: Normal breath sounds. No wheezing, rhonchi or rales.   Abdominal:      General: Abdomen is flat. There is no distension.      Palpations: Abdomen is soft.      Tenderness: There is no abdominal tenderness.   Musculoskeletal:      Cervical back: Normal range of motion and neck supple.      Right lower leg: No edema.      Left lower leg: No edema.   Skin:     General: Skin is warm.   Neurological:      Mental Status: He is alert. Mental status is at baseline.   Psychiatric:         Behavior: Behavior normal.       Significant Labs: All pertinent labs within the past 24 hours have been reviewed.    Recent Results (from the past 24 hour(s))   POCT glucose    Collection Time: 02/21/22  2:39 PM   Result Value Ref Range    POCT Glucose 190 (H) 70 - 110 mg/dL   POCT glucose    Collection Time: 02/21/22  5:03 PM   Result Value Ref Range    POCT Glucose 95 70 - 110 mg/dL   Lactate Dehydrogenase    Collection Time: 02/21/22  8:22 PM   Result Value Ref Range     (H) 110 - 260 U/L   Ferritin    Collection Time: 02/21/22  8:22 PM   Result Value Ref Range    Ferritin 481 (H) 20.0 - 300.0 ng/mL   D-Dimer, Quantitative    Collection Time: 02/21/22  8:22 PM   Result Value Ref Range    D-Dimer 0.50 (H) <0.50 mg/L FEU   Troponin I    Collection Time: 02/21/22  8:22 PM   Result Value Ref Range     Troponin I 0.470 (H) 0.000 - 0.026 ng/mL   POCT glucose    Collection Time: 02/21/22  8:50 PM   Result Value Ref Range    POCT Glucose 59 (L) 70 - 110 mg/dL   POCT glucose    Collection Time: 02/21/22  8:52 PM   Result Value Ref Range    POCT Glucose 109 70 - 110 mg/dL   Comprehensive metabolic panel    Collection Time: 02/22/22  4:16 AM   Result Value Ref Range    Sodium 142 136 - 145 mmol/L    Potassium 3.2 (L) 3.5 - 5.1 mmol/L    Chloride 108 95 - 110 mmol/L    CO2 24 23 - 29 mmol/L    Glucose 24 (LL) 70 - 110 mg/dL    BUN 24 (H) 8 - 23 mg/dL    Creatinine 1.2 0.5 - 1.4 mg/dL    Calcium 8.6 (L) 8.7 - 10.5 mg/dL    Total Protein 5.8 (L) 6.0 - 8.4 g/dL    Albumin 2.0 (L) 3.5 - 5.2 g/dL    Total Bilirubin 0.4 0.1 - 1.0 mg/dL    Alkaline Phosphatase 154 (H) 55 - 135 U/L    AST 28 10 - 40 U/L    ALT 12 10 - 44 U/L    Anion Gap 10 8 - 16 mmol/L    eGFR if African American >60.0 >60 mL/min/1.73 m^2    eGFR if non  57.6 (A) >60 mL/min/1.73 m^2   Magnesium    Collection Time: 02/22/22  4:16 AM   Result Value Ref Range    Magnesium 1.6 1.6 - 2.6 mg/dL   CBC auto differential    Collection Time: 02/22/22  4:16 AM   Result Value Ref Range    WBC 11.51 3.90 - 12.70 K/uL    RBC 4.01 (L) 4.60 - 6.20 M/uL    Hemoglobin 11.4 (L) 14.0 - 18.0 g/dL    Hematocrit 34.8 (L) 40.0 - 54.0 %    MCV 87 82 - 98 fL    MCH 28.4 27.0 - 31.0 pg    MCHC 32.8 32.0 - 36.0 g/dL    RDW 14.3 11.5 - 14.5 %    Platelets 258 150 - 450 K/uL    MPV 9.7 9.2 - 12.9 fL    Immature Granulocytes 0.4 0.0 - 0.5 %    Gran # (ANC) 9.3 (H) 1.8 - 7.7 K/uL    Immature Grans (Abs) 0.05 (H) 0.00 - 0.04 K/uL    Lymph # 1.0 1.0 - 4.8 K/uL    Mono # 0.7 0.3 - 1.0 K/uL    Eos # 0.5 0.0 - 0.5 K/uL    Baso # 0.05 0.00 - 0.20 K/uL    nRBC 0 0 /100 WBC    Gran % 80.5 (H) 38.0 - 73.0 %    Lymph % 8.6 (L) 18.0 - 48.0 %    Mono % 5.8 4.0 - 15.0 %    Eosinophil % 4.3 0.0 - 8.0 %    Basophil % 0.4 0.0 - 1.9 %    Differential Method Automated    Phosphorus     Collection Time: 02/22/22  4:16 AM   Result Value Ref Range    Phosphorus 3.0 2.7 - 4.5 mg/dL   POCT glucose    Collection Time: 02/22/22  5:14 AM   Result Value Ref Range    POCT Glucose 26 (LL) 70 - 110 mg/dL   POCT glucose    Collection Time: 02/22/22  5:16 AM   Result Value Ref Range    POCT Glucose 27 (LL) 70 - 110 mg/dL   POCT glucose    Collection Time: 02/22/22  5:43 AM   Result Value Ref Range    POCT Glucose 159 (H) 70 - 110 mg/dL   POCT glucose    Collection Time: 02/22/22  8:15 AM   Result Value Ref Range    POCT Glucose 117 (H) 70 - 110 mg/dL   POCT glucose    Collection Time: 02/22/22 11:51 AM   Result Value Ref Range    POCT Glucose 215 (H) 70 - 110 mg/dL         Significant Imaging: I have reviewed all pertinent imaging results/findings within the past 24 hours.    Imaging Results              X-Ray Chest AP Portable (Final result)  Result time 02/19/22 20:32:55      Final result by Azeem Cao MD (02/19/22 20:32:55)                   Impression:      Diffuse interstitial opacities.      Electronically signed by: Azeem Cao MD  Date:    02/19/2022  Time:    20:32               Narrative:    EXAMINATION:  XR CHEST AP PORTABLE    CLINICAL HISTORY:  Sepsis;    TECHNIQUE:  Single frontal view of the chest was performed.    COMPARISON:  01/25/2022.    FINDINGS:  Monitoring EKG leads are present.  There are postoperative changes in the base of the neck.    The trachea is unremarkable.  The cardiomediastinal silhouette is enlarged.  There is no evidence of free air beneath the hemidiaphragms there are no pleural effusions there is no evidence of a pneumothorax.  There is no evidence of pneumomediastinum.  There are diffuse interstitial opacities.  The osseous structures demonstrate degenerative changes.                                       CT Head Without Contrast (Final result)  Result time 02/19/22 20:21:19      Final result by Carli Coulter MD (02/19/22 20:21:19)                   Impression:       Acute to subacute infarcts involving the right frontal and left cerebellar hemisphere.      Electronically signed by: Carli Coulter  Date:    02/19/2022  Time:    20:21               Narrative:    EXAMINATION:  CT OF THE HEAD WITHOUT    CLINICAL HISTORY:  Mental status change, unknown cause;Recent subacute infarction with possible area of punctate hemorrhage, now confused;    TECHNIQUE:  5 mm unenhanced axial images were obtained from the skull base to the vertex.    COMPARISON:  02/17/2022    FINDINGS:  Stable cerebral atrophy and chronic small vessel ischemic changes are present.  There are areas of infarct involving the right frontal and left cerebellar hemisphere.  There has been no hemorrhagic conversion.  Mild right frontal laminar necrosis is seen.  There is no acute intracranial hemorrhage, territorial infarct or mass effect, or midline shift. In the visualized paranasal sinuses, there is mild left maxillary sinus mucoperiosteal thickening.

## 2022-02-22 NOTE — SIGNIFICANT EVENT
I was called on Mr. Muñoz because of an episode of chest pain.  I spoke with him at the bedside, he reported substernal chest pain that lasted approximately 2 minutes.  No radiation and no associated symptoms.  He had no worsening dyspnea, no nausea, and no diaphoresis.  He was chest pain-free during my evaluation.  He reported he has never used sublingual nitroglycerin.    Temperature 98.5°, pulse 88, respirations 18, blood pressure 136/65, O2 sats 92-97%    He is on amiodarone, apixaban, aspirin, Plavix, and statin.    Stat EKG and stat troponin ordered, will base further plans on the results.      Addendum:  EKG has sinus rhythm with PACs and LVH, no acute changes.  Troponin decreased from 0.654 at 4:29 a.m. to 0.470 at 8:22 p.m..  -with chest pain resolved, will monitor on current regimen for now.

## 2022-02-22 NOTE — PLAN OF CARE
Problem: Adult Inpatient Plan of Care  Goal: Plan of Care Review  Outcome: Ongoing, Progressing  Goal: Patient-Specific Goal (Individualized)  Outcome: Ongoing, Progressing  Goal: Absence of Hospital-Acquired Illness or Injury  Outcome: Ongoing, Progressing  Goal: Optimal Comfort and Wellbeing  Outcome: Ongoing, Progressing  Goal: Readiness for Transition of Care  Outcome: Ongoing, Progressing     Problem: Diabetes Comorbidity  Goal: Blood Glucose Level Within Targeted Range  Outcome: Ongoing, Progressing     Problem: Fluid and Electrolyte Imbalance (Acute Kidney Injury/Impairment)  Goal: Fluid and Electrolyte Balance  Outcome: Ongoing, Progressing     Problem: Oral Intake Inadequate (Acute Kidney Injury/Impairment)  Goal: Optimal Nutrition Intake  Outcome: Ongoing, Progressing     Problem: Renal Function Impairment (Acute Kidney Injury/Impairment)  Goal: Effective Renal Function  Outcome: Ongoing, Progressing     Problem: Impaired Wound Healing  Goal: Optimal Wound Healing  Outcome: Ongoing, Progressing     Problem: Fall Injury Risk  Goal: Absence of Fall and Fall-Related Injury  Outcome: Ongoing, Progressing     Problem: Restraint, Nonbehavioral (Nonviolent)  Goal: Absence of Harm or Injury  Outcome: Ongoing, Progressing     Problem: Infection  Goal: Absence of Infection Signs and Symptoms  Outcome: Ongoing, Progressing     Problem: Adjustment to Illness (Sepsis/Septic Shock)  Goal: Optimal Coping  Outcome: Ongoing, Progressing     Problem: Bleeding (Sepsis/Septic Shock)  Goal: Absence of Bleeding  Outcome: Ongoing, Progressing     Problem: Glycemic Control Impaired (Sepsis/Septic Shock)  Goal: Blood Glucose Level Within Desired Range  Outcome: Ongoing, Progressing     Problem: Infection Progression (Sepsis/Septic Shock)  Goal: Absence of Infection Signs and Symptoms  Outcome: Ongoing, Progressing     Problem: Nutrition Impaired (Sepsis/Septic Shock)  Goal: Optimal Nutrition Intake  Outcome: Ongoing,  Progressing     Problem: Skin Injury Risk Increased  Goal: Skin Health and Integrity  Outcome: Ongoing, Progressing       No acute issues overnight. Meds given per MD order. WC performed. Safety checks performed. Remains on 2L NC. Bed remains low, call light in reach.

## 2022-02-22 NOTE — PT/OT/SLP PROGRESS
"Occupational Therapy   Treatment    Name: Kamar Muñoz  MRN: [id number]  Admitting Diagnosis:  Encephalopathy, metabolic       Recommendations:     Discharge Recommendations: rehabilitation facility  Discharge Equipment Recommendations:   (TBD)  Barriers to discharge:  None    Assessment:     Kamar Muñoz is a 76 y.o. male with a medical diagnosis of Encephalopathy, metabolic.  He presents with the following performance deficits affecting function are weakness, impaired endurance, impaired self care skills, impaired functional mobilty, gait instability, impaired balance, decreased coordination, decreased lower extremity function, decreased safety awareness, impaired fine motor.     Patient participated well with therapy, demonstrated very good progress on this date with transfer attempt, required minimal assist with B HHA for sit <> stand, presents with tremulous UE's during seated ADL's and required increased time for coordination and verbal cues for task completion due to perseverating and impaired sequencing. Patient required minimal assist x 2 persons for lateral steps, cues and manual assist through out; will continue to benefit from therapy, was very motivated to continue transfer attempts and participate with therapy, will benefit greatly from intensive and collaborative therapy in rehabilitation facility.     Rehab Prognosis:  Good; patient would benefit from acute skilled OT services to address these deficits and reach maximum level of function.       Plan:     Patient to be seen 4 x/week to address the above listed problems via self-care/home management, therapeutic activities, therapeutic exercises, neuromuscular re-education  · Plan of Care Expires: 03/22/22  · Plan of Care Reviewed with: patient    Subjective   "I really want to try, whatever yal   Pain/Comfort:  · Pain Rating 1: other (see comments) (Pt did not rate sacral wound pain)  · Location - Orientation 1: generalized  · Location 1: " sacral spine  · Pain Addressed 1: Distraction, Reposition, Nurse notified  · Pain Rating Post-Intervention 1:  (Improved when off weighted at rest, did not rate numerically)    Objective:   Communicated with: Nursing prior to session.  Patient found supine with blood pressure cuff, bed alarm, lopez catheter, peripheral IV, pulse ox (continuous), telemetry upon OT entry to room.    General Precautions: Standard, airborne, droplet, contact, seizure   Orthopedic Precautions:N/A   Braces: N/A  Respiratory Status: Room air     Occupational Performance:     Bed Mobility:    · Patient completed Rolling/Turning to Left with minimum assistance  · Patient completed Scooting/Bridging with contact guard assistance  · Patient completed Supine to Sit with minimum assistance, increased time and cues for use of bed rail, patient with impaired motor planning and task sequencing  · Patient completed Sit to Supine with minimum assistance, management of LE's     Functional Mobility/Transfers:  · Patient completed Sit <> Stand Transfer with minimum assistance  with  hand-held assist   · Functional Mobility: Minimal with hand-held assist x 2 persons for steps laterally to L along edge of bed, upon second standing attempt following seated rest break     Activities of Daily Living:  · Grooming: stand by assistance seated edge of bed for oral hygiene and washing face  · Upper Body Dressing: minimal assistance seated edge of bed to don gown behind back  · Lower Body Dressing: maximal assistance to don shoes, patient with tremulous UE's, able to complete 4-point technique with L LE only due to pain when leaning from sacral wound    [facility] 6 Click ADL: 12    Treatment & Education:  -Patient and family educated on roles/goals of OT and POC.  -White board updated.  -Therapist provided time for questions and answered within scope of practice.  -Patient educated on importance of EOB/OOB activity to maximize recovery.    Patient left HOB elevated  with all lines intact and call button in reachEducation:      GOALS:   Multidisciplinary Problems     Occupational Therapy Goals        Problem: Occupational Therapy Goal    Goal Priority Disciplines Outcome Interventions   Occupational Therapy Goal     OT, PT/OT Ongoing, Progressing    Description: Goals to be met by: 3/6/22     Patient will increase functional independence with ADLs by performing:    Feeding with [place].  UE Dressing with Stand-by Assistance.  LE Dressing with Minimal Assistance.  Grooming while standing with Stand-by Assistance.  Toileting from bedside commode with Stand-by Assistance for hygiene and clothing management.   Toilet transfer to bedside commode with Stand-by Assistance.                     Time Tracking:     OT Date of Treatment: 02/22/22  OT Start Time: 1006  OT Stop Time: 1039  OT Total Time (min): 33 min    Billable Minutes:Self Care/Home Management 13  Therapeutic Activity 20    OT/JUAN: OT          2/22/2022

## 2022-02-22 NOTE — PLAN OF CARE
SW faxed updated clinicals to Rehab referrals for review. JASIEL will continue to follow.     [facility] - Oscar Gregg LMSW   - [facility]  Ext. [phone]

## 2022-02-22 NOTE — PLAN OF CARE
Goals reviewed and remain appropriate. Pt progressing towards goals.    2022    Problem: Physical Therapy Goal  Goal: Physical Therapy Goal  Description: Goals to be met by: 3/10/22     Patient will increase functional independence with mobility by performin. Supine to sit with MInimal Assistance  2. Sit to supine with MInimal Assistance - met   2a. Sit to supine with SBA   3. Sit to stand transfer with Minimal Assistance - met   3a. Sit to stand transfer SBA  4. Gait  x 25 feet with Minimal Assistance using LRAD, if needed.   5. Sitting at edge of bed x10 minutes with Modified [place]  6. Stand for 3 minutes with Minimal Assistance using LRAD, if needed  7. Lower extremity exercise program x10 reps per handout, with independence    Outcome: Ongoing, Progressing

## 2022-02-22 NOTE — PLAN OF CARE
[facility] ([facility])  Discharge Reassessment    Primary Care Provider: Basim Guerrero MD    Expected Discharge Date: 2/24/2022    Reassessment (most recent)       Discharge Reassessment - 02/22/22 0930          Discharge Reassessment    Assessment Type Discharge Planning Reassessment     Discharge Plan discussed with: Patient     Communicated ALLIE with patient/caregiver Date not available/Unable to determine     Discharge Plan A Rehab     Discharge Plan B Skilled Nursing Facility     DME Needed Upon Discharge  other (see comments)   tbd    Why the patient remains in the hospital Requires continued medical care        Post-Acute Status    Post-Acute Authorization Placement                   0930  Patient resting quietly in bed when CM rounded. No family present. Patient stepdown from 15WT, was admitted with metabolic encephalopathy, (+) covid 2/17/2022, & is being followed by wound care & PT/OT.    Patient was hospitalized at [facility] 1/25/2022-1/30/2022 with AMS, discharged to [facility] 1/30/2022-2/17/2022, returned to the ED on 2/17/2022 following a fall at home, was discharged home, & returned to the ED on 2/19/2022 with metabolic encephalopathy.     Patient usually lives with his spouse, Aimee Muñoz, who is currently hospitalized at [facility], has equipment to assist with ADLs, & is currently receiving home health services from [facility]. CM informed Lisa ([phone]) with [facility] of the patient's hospitalization. Awaiting PT/OT recs for discharge needs.     Message sent to Dr. Basim Guerrero's (PCP)  requesting a hospital follow up appointment. Awaiting return call.     1315  Voicemail message received informing of a previously scheduled appointment with Dr. Kumar on 28/24/2022 at 1000.    1430  Voicemail message left for Dr. Kumar'  informed of the patient's hospitalization & requesting that the hospital follow up appointment be move. Awaiting return call.        Will continue to follow.

## 2022-02-23 LAB
ALBUMIN SERPL BCP-MCNC: 1.8 G/DL (ref 3.5–5.2)
ALP SERPL-CCNC: 138 U/L (ref 55–135)
ALT SERPL W/O P-5'-P-CCNC: 13 U/L (ref 10–44)
ANION GAP SERPL CALC-SCNC: 7 MMOL/L (ref 8–16)
AST SERPL-CCNC: 21 U/L (ref 10–40)
BASOPHILS # BLD AUTO: 0.06 K/UL (ref 0–0.2)
BASOPHILS NFR BLD: 0.7 % (ref 0–1.9)
BILIRUB SERPL-MCNC: 0.5 MG/DL (ref 0.1–1)
BUN SERPL-MCNC: 21 MG/DL (ref 8–23)
CALCIUM SERPL-MCNC: 8.1 MG/DL (ref 8.7–10.5)
CHLORIDE SERPL-SCNC: 107 MMOL/L (ref 95–110)
CO2 SERPL-SCNC: 25 MMOL/L (ref 23–29)
CREAT SERPL-MCNC: 1 MG/DL (ref 0.5–1.4)
DIFFERENTIAL METHOD: ABNORMAL
EOSINOPHIL # BLD AUTO: 0.6 K/UL (ref 0–0.5)
EOSINOPHIL NFR BLD: 7 % (ref 0–8)
ERYTHROCYTE [DISTWIDTH] IN BLOOD BY AUTOMATED COUNT: 14.8 % (ref 11.5–14.5)
EST. GFR  (AFRICAN AMERICAN): >60 ML/MIN/1.73 M^2
EST. GFR  (NON AFRICAN AMERICAN): >60 ML/MIN/1.73 M^2
GLUCOSE SERPL-MCNC: 121 MG/DL (ref 70–110)
HCT VFR BLD AUTO: 35.2 % (ref 40–54)
HGB BLD-MCNC: 11.1 G/DL (ref 14–18)
IMM GRANULOCYTES # BLD AUTO: 0.04 K/UL (ref 0–0.04)
IMM GRANULOCYTES NFR BLD AUTO: 0.4 % (ref 0–0.5)
LACOSAMIDE: 3.5 MCG/ML (ref 1–10)
LYMPHOCYTES # BLD AUTO: 1.2 K/UL (ref 1–4.8)
LYMPHOCYTES NFR BLD: 13.4 % (ref 18–48)
MAGNESIUM SERPL-MCNC: 1.6 MG/DL (ref 1.6–2.6)
MCH RBC QN AUTO: 28.2 PG (ref 27–31)
MCHC RBC AUTO-ENTMCNC: 31.5 G/DL (ref 32–36)
MCV RBC AUTO: 90 FL (ref 82–98)
MONOCYTES # BLD AUTO: 0.5 K/UL (ref 0.3–1)
MONOCYTES NFR BLD: 5.6 % (ref 4–15)
NEUTROPHILS # BLD AUTO: 6.7 K/UL (ref 1.8–7.7)
NEUTROPHILS NFR BLD: 72.9 % (ref 38–73)
NRBC BLD-RTO: 0 /100 WBC
PHOSPHATE SERPL-MCNC: 2.5 MG/DL (ref 2.7–4.5)
PLATELET # BLD AUTO: 204 K/UL (ref 150–450)
PMV BLD AUTO: 9.9 FL (ref 9.2–12.9)
POCT GLUCOSE: 172 MG/DL (ref 70–110)
POCT GLUCOSE: 250 MG/DL (ref 70–110)
POCT GLUCOSE: 293 MG/DL (ref 70–110)
POCT GLUCOSE: 83 MG/DL (ref 70–110)
POTASSIUM SERPL-SCNC: 4.1 MMOL/L (ref 3.5–5.1)
PROT SERPL-MCNC: 5.5 G/DL (ref 6–8.4)
RBC # BLD AUTO: 3.93 M/UL (ref 4.6–6.2)
SODIUM SERPL-SCNC: 139 MMOL/L (ref 136–145)
WBC # BLD AUTO: 9.15 K/UL (ref 3.9–12.7)

## 2022-02-23 PROCEDURE — 25000003 PHARM REV CODE 250: Mod: HCNC | Performed by: INTERNAL MEDICINE

## 2022-02-23 PROCEDURE — 63600175 PHARM REV CODE 636 W HCPCS: Mod: HCNC | Performed by: STUDENT IN AN ORGANIZED HEALTH CARE EDUCATION/TRAINING PROGRAM

## 2022-02-23 PROCEDURE — 63600175 PHARM REV CODE 636 W HCPCS: Mod: HCNC | Performed by: INTERNAL MEDICINE

## 2022-02-23 PROCEDURE — 99232 PR SUBSEQUENT HOSPITAL CARE,LEVL II: ICD-10-PCS | Mod: HCNC,,, | Performed by: STUDENT IN AN ORGANIZED HEALTH CARE EDUCATION/TRAINING PROGRAM

## 2022-02-23 PROCEDURE — 94640 AIRWAY INHALATION TREATMENT: CPT | Mod: HCNC

## 2022-02-23 PROCEDURE — 97535 SELF CARE MNGMENT TRAINING: CPT | Mod: HCNC

## 2022-02-23 PROCEDURE — 25000003 PHARM REV CODE 250: Mod: HCNC | Performed by: STUDENT IN AN ORGANIZED HEALTH CARE EDUCATION/TRAINING PROGRAM

## 2022-02-23 PROCEDURE — 84100 ASSAY OF PHOSPHORUS: CPT | Mod: HCNC | Performed by: STUDENT IN AN ORGANIZED HEALTH CARE EDUCATION/TRAINING PROGRAM

## 2022-02-23 PROCEDURE — 97530 THERAPEUTIC ACTIVITIES: CPT | Mod: HCNC

## 2022-02-23 PROCEDURE — 27000207 HC ISOLATION: Mod: HCNC

## 2022-02-23 PROCEDURE — 85025 COMPLETE CBC W/AUTO DIFF WBC: CPT | Mod: HCNC | Performed by: STUDENT IN AN ORGANIZED HEALTH CARE EDUCATION/TRAINING PROGRAM

## 2022-02-23 PROCEDURE — 80053 COMPREHEN METABOLIC PANEL: CPT | Mod: HCNC | Performed by: STUDENT IN AN ORGANIZED HEALTH CARE EDUCATION/TRAINING PROGRAM

## 2022-02-23 PROCEDURE — 83735 ASSAY OF MAGNESIUM: CPT | Mod: HCNC | Performed by: STUDENT IN AN ORGANIZED HEALTH CARE EDUCATION/TRAINING PROGRAM

## 2022-02-23 PROCEDURE — 94761 N-INVAS EAR/PLS OXIMETRY MLT: CPT | Mod: HCNC

## 2022-02-23 PROCEDURE — 97116 GAIT TRAINING THERAPY: CPT | Mod: HCNC

## 2022-02-23 PROCEDURE — 20000000 HC ICU ROOM: Mod: HCNC

## 2022-02-23 PROCEDURE — 99232 SBSQ HOSP IP/OBS MODERATE 35: CPT | Mod: HCNC,,, | Performed by: STUDENT IN AN ORGANIZED HEALTH CARE EDUCATION/TRAINING PROGRAM

## 2022-02-23 RX ORDER — TALC
6 POWDER (GRAM) TOPICAL NIGHTLY PRN
Status: DISCONTINUED | OUTPATIENT
Start: 2022-02-23 | End: 2022-03-10 | Stop reason: HOSPADM

## 2022-02-23 RX ADMIN — CLOPIDOGREL 75 MG: 75 TABLET, FILM COATED ORAL at 09:02

## 2022-02-23 RX ADMIN — AMIODARONE HYDROCHLORIDE 200 MG: 200 TABLET ORAL at 09:02

## 2022-02-23 RX ADMIN — ALBUTEROL SULFATE 2 PUFF: 108 INHALANT RESPIRATORY (INHALATION) at 01:02

## 2022-02-23 RX ADMIN — MUPIROCIN: 20 OINTMENT TOPICAL at 08:02

## 2022-02-23 RX ADMIN — OXYCODONE HYDROCHLORIDE AND ACETAMINOPHEN 500 MG: 500 TABLET ORAL at 08:02

## 2022-02-23 RX ADMIN — REMDESIVIR 100 MG: 100 INJECTION, POWDER, LYOPHILIZED, FOR SOLUTION INTRAVENOUS at 09:02

## 2022-02-23 RX ADMIN — APIXABAN 5 MG: 5 TABLET, FILM COATED ORAL at 09:02

## 2022-02-23 RX ADMIN — ALBUTEROL SULFATE 2 PUFF: 108 INHALANT RESPIRATORY (INHALATION) at 07:02

## 2022-02-23 RX ADMIN — ALBUTEROL SULFATE 2 PUFF: 108 INHALANT RESPIRATORY (INHALATION) at 12:02

## 2022-02-23 RX ADMIN — LACOSAMIDE 100 MG: 100 TABLET, FILM COATED ORAL at 08:02

## 2022-02-23 RX ADMIN — MUPIROCIN: 20 OINTMENT TOPICAL at 09:02

## 2022-02-23 RX ADMIN — INSULIN ASPART 4 UNITS: 100 INJECTION, SOLUTION INTRAVENOUS; SUBCUTANEOUS at 05:02

## 2022-02-23 RX ADMIN — LACOSAMIDE 100 MG: 100 TABLET, FILM COATED ORAL at 09:02

## 2022-02-23 RX ADMIN — APIXABAN 5 MG: 5 TABLET, FILM COATED ORAL at 08:02

## 2022-02-23 RX ADMIN — PIPERACILLIN AND TAZOBACTAM 4.5 G: 4; .5 INJECTION, POWDER, LYOPHILIZED, FOR SOLUTION INTRAVENOUS; PARENTERAL at 03:02

## 2022-02-23 RX ADMIN — INSULIN ASPART 5 UNITS: 100 INJECTION, SOLUTION INTRAVENOUS; SUBCUTANEOUS at 05:02

## 2022-02-23 RX ADMIN — DOXYCYCLINE HYCLATE 100 MG: 100 TABLET, COATED ORAL at 08:02

## 2022-02-23 RX ADMIN — METOPROLOL TARTRATE 25 MG: 25 TABLET, FILM COATED ORAL at 08:02

## 2022-02-23 RX ADMIN — PIPERACILLIN AND TAZOBACTAM 4.5 G: 4; .5 INJECTION, POWDER, LYOPHILIZED, FOR SOLUTION INTRAVENOUS; PARENTERAL at 04:02

## 2022-02-23 RX ADMIN — DOXYCYCLINE HYCLATE 100 MG: 100 TABLET, COATED ORAL at 09:02

## 2022-02-23 RX ADMIN — PANTOPRAZOLE SODIUM 40 MG: 40 TABLET, DELAYED RELEASE ORAL at 09:02

## 2022-02-23 RX ADMIN — INSULIN ASPART 3 UNITS: 100 INJECTION, SOLUTION INTRAVENOUS; SUBCUTANEOUS at 08:02

## 2022-02-23 RX ADMIN — OXYCODONE HYDROCHLORIDE AND ACETAMINOPHEN 500 MG: 500 TABLET ORAL at 09:02

## 2022-02-23 RX ADMIN — ACETAMINOPHEN 650 MG: 325 TABLET ORAL at 08:02

## 2022-02-23 RX ADMIN — PIPERACILLIN AND TAZOBACTAM 4.5 G: 4; .5 INJECTION, POWDER, LYOPHILIZED, FOR SOLUTION INTRAVENOUS; PARENTERAL at 08:02

## 2022-02-23 RX ADMIN — MULTIPLE VITAMINS W/ MINERALS TAB 1 TABLET: TAB at 09:02

## 2022-02-23 RX ADMIN — ATORVASTATIN CALCIUM 40 MG: 20 TABLET, FILM COATED ORAL at 09:02

## 2022-02-23 RX ADMIN — ASPIRIN 81 MG: 81 TABLET, COATED ORAL at 09:02

## 2022-02-23 RX ADMIN — METOPROLOL TARTRATE 25 MG: 25 TABLET, FILM COATED ORAL at 09:02

## 2022-02-23 RX ADMIN — Medication 6 MG: at 08:02

## 2022-02-23 RX ADMIN — ALBUTEROL SULFATE 2 PUFF: 108 INHALANT RESPIRATORY (INHALATION) at 08:02

## 2022-02-23 NOTE — CONSULTS
[facility] ([place] [address])  [facility] Medicine  Telemedicine Consult Note    Patient Name: Kamar Muñoz  MRN: [id number]  Admission Date: 2/19/2022  Hospital Length of Stay: 3 days  Attending Physician: Imelda Rondon MD   Primary Care Provider: Basim Guerrero MD     Thank you for your consult to [facility]. We have reviewed the patient chart. This patient does not meet criteria for [facility] service at this time due to [facility] service capacity limitations. Will hand back to In-house service..      Tiffany Awad MD  Department of Hospital Medicine   [facility] Intensive [facility] ([place]-16)

## 2022-02-23 NOTE — PT/OT/SLP PROGRESS
"Physical Therapy Co-Treatment    Patient Name:  Kamar Muñoz   MRN:  [id number]    Recommendations:     Discharge Recommendations:  rehabilitation facility   Discharge Equipment Recommendations: none   Barriers to discharge: Decreased caregiver support    Assessment:     Kamar Muñoz is a 76 y.o. male admitted with a medical diagnosis of Encephalopathy, metabolic.  He presents with the following impairments/functional limitations:  weakness, impaired balance, impaired endurance, impaired self care skills, impaired functional mobilty, gait instability, impaired cardiopulmonary response to activity, decreased safety awareness, impaired skin. Pt continuing to progress functional mobility, as he was able to ambulate short household distance using RW this date. Pt demo'ing instability and altered gait mechanics, requiring cues throughout for correction. Impaired endurance and overall weakness noted with progressively increasing fatigue during gait trial, limiting further progression. Pt would continue to benefit from skilled acute PT in order to address current deficits and progress functional mobility.     Rehab Prognosis: Good; patient would benefit from acute skilled PT services to address these deficits and reach maximum level of function.    Recent Surgery: * No surgery found *       Plan:     During this hospitalization, patient to be seen 4 x/week to address the identified rehab impairments via gait training, therapeutic activities, therapeutic exercises, neuromuscular re-education and progress toward the following goals:    · Plan of Care Expires:  03/22/22    Subjective     Chief Complaint: pain at sacrum 2* wound  Patient/Family Comments/goals: "I'd like to go tell my wife I'm moving."  Pain/Comfort:  Pain Rating 1:  (did not rate)  Location 1: sacral spine  Pain Addressed 1: Reposition, Distraction      Objective:     Communicated with RN prior to session.  Patient found supine with bed alarm upon PT " entry to room.     General Precautions: Standard, airborne, contact, droplet, fall   Orthopedic Precautions:N/A   Braces: N/A  Respiratory Status: Room air      Functional Mobility:  · Bed Mobility:     · Supine to Sit: min assistance using bed rail  · cues provided for sequencing and technique   · Transfers:     · Sit to Stand: contact guard assistance with RW, x1 rep from EOB and x1 rep from bedside chair   · Cues for hand placement and transfer technique with RW   · Bed to Chair: CGA with RW using stand pivot   · Cues provided for hand placement and transfer technique with RW   · Gait: ~20 ft. with RW and CGA  · demo'd decreased step length (R>L), decreased floor clearance, impaired weight-shifting ability, instability, narrow NICOLE, decreased heel strike  · Cues provided for sequencing, appropriate weight-shifts, RW management, increased step length RLE, and increased floor clearance   · Increasing fatigue and functional weakness noted towards end of trial, requiring return to sit       AM-PAC 6 CLICK MOBILITY  Turning over in bed (including adjusting bedclothes, sheets and blankets)?: 3  Sitting down on and standing up from a chair with arms (e.g., wheelchair, bedside commode, etc.): 3  Moving from lying on back to sitting on the side of the bed?: 3  Moving to and from a bed to a chair (including a wheelchair)?: 3  Need to walk in hospital room?: 3  Climbing 3-5 steps with a railing?: 1  Basic Mobility Total Score: 16       Therapeutic Activities and Exercises:  Pt educated on role of PT and PT POC. Pt verbalized understanding.   Daily orientation provided.   Increased time provided to complete all mobility and functional tasks with delayed processing and responses noted throughout.   RW brought to bedside for use during hospital admission.  At end of session, pt brought to his wife's room next door for visit. RN reported pt okay to remain in wife's room at end of session.     Patient left up in chair in wife's  room next door with all lines intact, call button in reach. RN notified and reported okay for pt to remain in chair in his wife's room.     GOALS:   Multidisciplinary Problems     Physical Therapy Goals        Problem: Physical Therapy Goal    Goal Priority Disciplines Outcome Goal Variances Interventions   Physical Therapy Goal     PT, PT/OT Ongoing, Progressing     Description: Goals to be met by: 3/10/22     Patient will increase functional independence with mobility by performin. Supine to sit with MInimal Assistance - met   1a. Supine to sit with SBA   2. Sit to supine with MInimal Assistance - met   2a. Sit to supine with SBA   3. Sit to stand transfer with Minimal Assistance - met   3a. Sit to stand transfer SBA  4. Gait  x 25 feet with Minimal Assistance using LRAD, if needed. - met   4a. Gait x150 ft with SBA using RW or LRAD as needed  5. Sitting at edge of bed x10 minutes with Modified Gaines  6. Stand for 3 minutes with Minimal Assistance using LRAD, if needed  7. Lower extremity exercise program x10 reps per handout, with independence                     Time Tracking:     PT Received On: 22  PT Start Time: 1052     PT Stop Time: 1119  PT Total Time (min): 27 min     Billable Minutes: Gait Training 15 and Therapeutic Activity 12   (co-treatment performed this date due to need for 2 skilled therapists in order to ensure pt safety, accomodate for pt activity tolerance, and maximize functional potential in the setting of COVID-19 diagnosis)     Treatment Type: Treatment  PT/PTA: PT     PTA Visit Number: 0     2022

## 2022-02-23 NOTE — PLAN OF CARE
Patient resting quietly in bed when CM rounded. No family present. CM informed the patient of PT/OT rec for IRF placement following discharge. Patient stated that he would like to return to [facility]. CM informed JASIEL Roach of above.      Will continue to follow.

## 2022-02-23 NOTE — PROGRESS NOTES
PM&R Recommendation:     At this time, the PM&R team has reviewed this patient's ongoing medical case including inpatient diagnosis, medical history, clinical examination, labs, vitals, current social and functional history to provide the post-acute recommendation as follows:     RECOMMENDATIONS: inpatient rehabilitation due to good motivation/participation with therapies and good potential for recovery.    MEDICAL STABILITY:     At this time, barriers for post-acute rehab admission include continued participation with therapy, completion of work up, and removal of airborne and contact precautions.    We will continue to follow.     HIRAL Tilley, FNP-C  Physical Medicine & Rehabilitation   02/23/2022

## 2022-02-23 NOTE — SUBJECTIVE & OBJECTIVE
Interval History:   NAEO. This AM Pt denies any acute complaints. He is taking in boost glucose shakes; notes a poor appetite in addition to not liking the hospital food.     Review of Systems   Constitutional:  Positive for activity change and fatigue. Negative for chills, diaphoresis and fever.   HENT:  Negative for congestion, postnasal drip, rhinorrhea and trouble swallowing.    Eyes: Negative.  Negative for visual disturbance.   Respiratory:  Negative for cough, shortness of breath and wheezing.    Cardiovascular:  Negative for chest pain, palpitations and leg swelling.   Gastrointestinal:  Negative for abdominal distention, abdominal pain, constipation, diarrhea, nausea and vomiting.   Endocrine: Negative.    Genitourinary: Negative.  Negative for dysuria.   Musculoskeletal: Negative.  Negative for arthralgias and myalgias.   Skin:  Negative for rash.   Allergic/Immunologic: Negative.    Neurological:  Negative for dizziness, facial asymmetry, light-headedness and headaches.   Psychiatric/Behavioral:  Negative for decreased concentration.      Objective:     Vital Signs (Most Recent):  Temp: 97.6 °F (36.4 °C) (02/23/22 1600)  Pulse: 72 (02/23/22 1600)  Resp: 18 (02/23/22 1600)  BP: (!) 145/76 (02/23/22 1600)  SpO2: (!) 91 % (02/23/22 1600) Vital Signs (24h Range):  Temp:  [97.6 °F (36.4 °C)-98 °F (36.7 °C)] 97.6 °F (36.4 °C)  Pulse:  [66-91] 72  Resp:  [16-20] 18  SpO2:  [90 %-96 %] 91 %  BP: (119-145)/(64-80) 145/76     Weight: 76.4 kg (168 lb 6.9 oz)  Body mass index is 21.63 kg/m².    Intake/Output Summary (Last 24 hours) at 2/23/2022 1653  Last data filed at 2/23/2022 1050  Gross per 24 hour   Intake 570 ml   Output 1250 ml   Net -680 ml        Physical Exam  Vitals and nursing note reviewed.   Constitutional:       General: He is not in acute distress.     Appearance: He is not toxic-appearing.   HENT:      Head: Normocephalic and atraumatic.      Right Ear: External ear normal.      Left Ear: External  ear normal.      Nose: Nose normal. No congestion.      Mouth/Throat:      Mouth: Mucous membranes are moist.   Eyes:      General: No scleral icterus.        Right eye: No discharge.         Left eye: No discharge.   Cardiovascular:      Rate and Rhythm: Normal rate and regular rhythm.      Pulses: Normal pulses.      Heart sounds: Normal heart sounds.   Pulmonary:      Effort: Pulmonary effort is normal. No respiratory distress.      Breath sounds: Normal breath sounds. No wheezing, rhonchi or rales.   Abdominal:      General: Abdomen is flat. There is no distension.      Palpations: Abdomen is soft.      Tenderness: There is no abdominal tenderness.   Musculoskeletal:      Cervical back: Normal range of motion and neck supple.      Right lower leg: No edema.      Left lower leg: No edema.   Skin:     General: Skin is warm.   Neurological:      Mental Status: He is alert. Mental status is at baseline.   Psychiatric:         Behavior: Behavior normal.       Significant Labs: All pertinent labs within the past 24 hours have been reviewed.    Recent Results (from the past 24 hour(s))   POCT glucose    Collection Time: 02/22/22  5:40 PM   Result Value Ref Range    POCT Glucose 293 (H) 70 - 110 mg/dL   POCT glucose    Collection Time: 02/22/22  8:14 PM   Result Value Ref Range    POCT Glucose 213 (H) 70 - 110 mg/dL   Comprehensive metabolic panel    Collection Time: 02/23/22  4:23 AM   Result Value Ref Range    Sodium 139 136 - 145 mmol/L    Potassium 4.1 3.5 - 5.1 mmol/L    Chloride 107 95 - 110 mmol/L    CO2 25 23 - 29 mmol/L    Glucose 121 (H) 70 - 110 mg/dL    BUN 21 8 - 23 mg/dL    Creatinine 1.0 0.5 - 1.4 mg/dL    Calcium 8.1 (L) 8.7 - 10.5 mg/dL    Total Protein 5.5 (L) 6.0 - 8.4 g/dL    Albumin 1.8 (L) 3.5 - 5.2 g/dL    Total Bilirubin 0.5 0.1 - 1.0 mg/dL    Alkaline Phosphatase 138 (H) 55 - 135 U/L    AST 21 10 - 40 U/L    ALT 13 10 - 44 U/L    Anion Gap 7 (L) 8 - 16 mmol/L    eGFR if African American >60.0  >60 mL/min/1.73 m^2    eGFR if non African American >60.0 >60 mL/min/1.73 m^2   Magnesium    Collection Time: 02/23/22  4:23 AM   Result Value Ref Range    Magnesium 1.6 1.6 - 2.6 mg/dL   CBC auto differential    Collection Time: 02/23/22  4:23 AM   Result Value Ref Range    WBC 9.15 3.90 - 12.70 K/uL    RBC 3.93 (L) 4.60 - 6.20 M/uL    Hemoglobin 11.1 (L) 14.0 - 18.0 g/dL    Hematocrit 35.2 (L) 40.0 - 54.0 %    MCV 90 82 - 98 fL    MCH 28.2 27.0 - 31.0 pg    MCHC 31.5 (L) 32.0 - 36.0 g/dL    RDW 14.8 (H) 11.5 - 14.5 %    Platelets 204 150 - 450 K/uL    MPV 9.9 9.2 - 12.9 fL    Immature Granulocytes 0.4 0.0 - 0.5 %    Gran # (ANC) 6.7 1.8 - 7.7 K/uL    Immature Grans (Abs) 0.04 0.00 - 0.04 K/uL    Lymph # 1.2 1.0 - 4.8 K/uL    Mono # 0.5 0.3 - 1.0 K/uL    Eos # 0.6 (H) 0.0 - 0.5 K/uL    Baso # 0.06 0.00 - 0.20 K/uL    nRBC 0 0 /100 WBC    Gran % 72.9 38.0 - 73.0 %    Lymph % 13.4 (L) 18.0 - 48.0 %    Mono % 5.6 4.0 - 15.0 %    Eosinophil % 7.0 0.0 - 8.0 %    Basophil % 0.7 0.0 - 1.9 %    Differential Method Automated    Phosphorus    Collection Time: 02/23/22  4:23 AM   Result Value Ref Range    Phosphorus 2.5 (L) 2.7 - 4.5 mg/dL   POCT glucose    Collection Time: 02/23/22  8:16 AM   Result Value Ref Range    POCT Glucose 83 70 - 110 mg/dL   POCT glucose    Collection Time: 02/23/22 11:35 AM   Result Value Ref Range    POCT Glucose 172 (H) 70 - 110 mg/dL   POCT glucose    Collection Time: 02/23/22  3:55 PM   Result Value Ref Range    POCT Glucose 250 (H) 70 - 110 mg/dL         Significant Imaging: I have reviewed all pertinent imaging results/findings within the past 24 hours.    Imaging Results              X-Ray Chest AP Portable (Final result)  Result time 02/19/22 20:32:55      Final result by Azeem Cao MD (02/19/22 20:32:55)                   Impression:      Diffuse interstitial opacities.      Electronically signed by: Azeem Cao MD  Date:    02/19/2022  Time:    20:32               Narrative:     EXAMINATION:  XR CHEST AP PORTABLE    CLINICAL HISTORY:  Sepsis;    TECHNIQUE:  Single frontal view of the chest was performed.    COMPARISON:  01/25/2022.    FINDINGS:  Monitoring EKG leads are present.  There are postoperative changes in the base of the neck.    The trachea is unremarkable.  The cardiomediastinal silhouette is enlarged.  There is no evidence of free air beneath the hemidiaphragms there are no pleural effusions there is no evidence of a pneumothorax.  There is no evidence of pneumomediastinum.  There are diffuse interstitial opacities.  The osseous structures demonstrate degenerative changes.                                       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      Electronically signed by: Carli Coulter  Date:    02/19/2022  Time:    20:21               Narrative:    EXAMINATION:  CT OF THE HEAD WITHOUT    CLINICAL HISTORY:  Mental status change, unknown cause;Recent subacute infarction with possible area of punctate hemorrhage, now confused;    TECHNIQUE:  5 mm unenhanced axial images were obtained from the skull base to the vertex.    COMPARISON:  02/17/2022    FINDINGS:  Stable cerebral atrophy and chronic small vessel ischemic changes are present.  There are areas of infarct involving the right frontal and left cerebellar hemisphere.  There has been no hemorrhagic conversion.  Mild right frontal laminar necrosis is seen.  There is no acute intracranial hemorrhage, territorial infarct or mass effect, or midline shift. In the visualized paranasal sinuses, there is mild left maxillary sinus mucoperiosteal thickening.

## 2022-02-23 NOTE — ASSESSMENT & PLAN NOTE
Upon admission:  Organ dysfunction indicated by Acute kidney injury, Encephalopathy  and Acute respiratory failure  Source- Unclear. CXR with diffuse interstitial opacities, however no consolidation noted. UA with pyuria, without elevated leuks. Blood cultures pending. Possibly due to sacral wound, although doesn't look grossly infected.     Started on broad spectrum abx at admission with vanc/zosyn/doxy.    - vancomycin discontinued today as no MRSA has isolated.  No infectious source found but leukocytosis persists.  Consider de-escalation further based on continued improvement.  Day 3 abx today.    F/u Urine and blood cultures  - Wound care consulted for sacral decubitus wound. Appreciate recs.  -lactic acid overall trend improved

## 2022-02-23 NOTE — PROGRESS NOTES
[facility] ([address] Medicine  Progress Note    Patient Name: Kamar Muñoz  MRN: [id number]  Patient Class: IP- Inpatient   Admission Date: 2/19/2022  Length of Stay: 4 days  Attending Physician: Imelda Rondon MD  Primary Care Provider: Basim Guerrero MD        Subjective:     Principal Problem:Encephalopathy, metabolic        HPI:  Kamar Muñoz is a 76 year old male with past medical history of CAD s/p 2 LAUREN in RCA (Jan 2022), HTN, HLD, p. A. 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 History was not obtained from patient due to encephalopathy. Patient's daughter at bedside reports the patient was recently in the ED on 2/17 for a mechanical fall after being discharged from rehab the same day. CTH and cervical spine revealed  evolving late subacute infarct involving the right frontal lobe with questionable petechial hemorrhage. Vascular neurology evaluated the patient and cleared him for discharge from without any new interventions. He was also tested positive for COVID-19 and was discharged yesterday from the ED. He subsequently received one dose of sotrovimab infusion for COVID and was in a normal state of health until this morning when his daughter found him lethargic and barely responsive prompting her to bring him to the ED. She reports the patient had no complaints prior to developing his AMS. Of note, the patient was recently admitted to [facility] from 01/25 through 02/13 for AMS concerning for CVA, however he was treated for  metabolic encephalopathy 2/2 to DKA/HHS, sepsis and BRENDAN.      Upon arrival to the ED, he was placed on 6L NC, normotensive and tachycardic. Lactic 11.5, WBC 17.8, Glucose 1109, pH 7.17, Co2 15.6 HCO3 <5, AG unable to calculate. sCr 3.1. CXR with intersitial opacities. He received ~4L of IVF, vancomycin, zosyn, initiated on insulin shifted for hyperkalemia and calcium for cardioprotection. ECG without noticeable ischemic changes. CTH without new changes- discussed findings with radiology. Patient was admitted to the MICU for further management.        Overview/Hospital Course:  MICU Course:  Placed on remdesivir and broad spectrum IV abx in addition to insulin per DKA protocol. In MICU: Weaned supp O2 to room air; Transitioned to subq insulin; Improvement of AMS. BRENDAN improving gradually. Pt had discussion w/ family in MICU and transitioned from full code to DNR/DNI.  Step down to HM initiated 2/21.  Pt w/ episode of CP and hypoglycemia upon stepdown. Ischemic work-up largely unremarkable with trop down trending from admission. Detemir dosing adjusted from BID to qhs.       Interval History:   NAEO. This AM Pt denies any acute complaints. He is taking in boost glucose shakes; notes a poor appetite in addition to not liking the hospital food.     Review of Systems   Constitutional:  Positive for activity change and fatigue. Negative for chills, diaphoresis and fever.   HENT:  Negative for congestion, postnasal drip, rhinorrhea and trouble swallowing.    Eyes: Negative.  Negative for visual disturbance.   Respiratory:  Negative for cough, shortness of breath and wheezing.    Cardiovascular:  Negative for chest pain, palpitations and leg swelling.   Gastrointestinal:  Negative for abdominal distention, abdominal pain, constipation, diarrhea, nausea and vomiting.   Endocrine: Negative.    Genitourinary: Negative.  Negative for dysuria.   Musculoskeletal: Negative.  Negative for arthralgias and myalgias.   Skin:  Negative  for rash.   Allergic/Immunologic: Negative.    Neurological:  Negative for dizziness, facial asymmetry, light-headedness and headaches.   Psychiatric/Behavioral:  Negative for decreased concentration.      Objective:     Vital Signs (Most Recent):  Temp: 97.6 °F (36.4 °C) (02/23/22 1600)  Pulse: 72 (02/23/22 1600)  Resp: 18 (02/23/22 1600)  BP: (!) 145/76 (02/23/22 1600)  SpO2: (!) 91 % (02/23/22 1600) Vital Signs (24h Range):  Temp:  [97.6 °F (36.4 °C)-98 °F (36.7 °C)] 97.6 °F (36.4 °C)  Pulse:  [66-91] 72  Resp:  [16-20] 18  SpO2:  [90 %-96 %] 91 %  BP: (119-145)/(64-80) 145/76     Weight: 76.4 kg (168 lb 6.9 oz)  Body mass index is 21.63 kg/m².    Intake/Output Summary (Last 24 hours) at 2/23/2022 1653  Last data filed at 2/23/2022 1050  Gross per 24 hour   Intake 570 ml   Output 1250 ml   Net -680 ml        Physical Exam  Vitals and nursing note reviewed.   Constitutional:       General: He is not in acute distress.     Appearance: He is not toxic-appearing.   HENT:      Head: Normocephalic and atraumatic.      Right Ear: External ear normal.      Left Ear: External ear normal.      Nose: Nose normal. No congestion.      Mouth/Throat:      Mouth: Mucous membranes are moist.   Eyes:      General: No scleral icterus.        Right eye: No discharge.         Left eye: No discharge.   Cardiovascular:      Rate and Rhythm: Normal rate and regular rhythm.      Pulses: Normal pulses.      Heart sounds: Normal heart sounds.   Pulmonary:      Effort: Pulmonary effort is normal. No respiratory distress.      Breath sounds: Normal breath sounds. No wheezing, rhonchi or rales.   Abdominal:      General: Abdomen is flat. There is no distension.      Palpations: Abdomen is soft.      Tenderness: There is no abdominal tenderness.   Musculoskeletal:      Cervical back: Normal range of motion and neck supple.      Right lower leg: No edema.      Left lower leg: No edema.   Skin:     General: Skin is warm.   Neurological:       Mental Status: He is alert. Mental status is at baseline.   Psychiatric:         Behavior: Behavior normal.       Significant Labs: All pertinent labs within the past 24 hours have been reviewed.    Recent Results (from the past 24 hour(s))   POCT glucose    Collection Time: 02/22/22  5:40 PM   Result Value Ref Range    POCT Glucose 293 (H) 70 - 110 mg/dL   POCT glucose    Collection Time: 02/22/22  8:14 PM   Result Value Ref Range    POCT Glucose 213 (H) 70 - 110 mg/dL   Comprehensive metabolic panel    Collection Time: 02/23/22  4:23 AM   Result Value Ref Range    Sodium 139 136 - 145 mmol/L    Potassium 4.1 3.5 - 5.1 mmol/L    Chloride 107 95 - 110 mmol/L    CO2 25 23 - 29 mmol/L    Glucose 121 (H) 70 - 110 mg/dL    BUN 21 8 - 23 mg/dL    Creatinine 1.0 0.5 - 1.4 mg/dL    Calcium 8.1 (L) 8.7 - 10.5 mg/dL    Total Protein 5.5 (L) 6.0 - 8.4 g/dL    Albumin 1.8 (L) 3.5 - 5.2 g/dL    Total Bilirubin 0.5 0.1 - 1.0 mg/dL    Alkaline Phosphatase 138 (H) 55 - 135 U/L    AST 21 10 - 40 U/L    ALT 13 10 - 44 U/L    Anion Gap 7 (L) 8 - 16 mmol/L    eGFR if African American >60.0 >60 mL/min/1.73 m^2    eGFR if non African American >60.0 >60 mL/min/1.73 m^2   Magnesium    Collection Time: 02/23/22  4:23 AM   Result Value Ref Range    Magnesium 1.6 1.6 - 2.6 mg/dL   CBC auto differential    Collection Time: 02/23/22  4:23 AM   Result Value Ref Range    WBC 9.15 3.90 - 12.70 K/uL    RBC 3.93 (L) 4.60 - 6.20 M/uL    Hemoglobin 11.1 (L) 14.0 - 18.0 g/dL    Hematocrit 35.2 (L) 40.0 - 54.0 %    MCV 90 82 - 98 fL    MCH 28.2 27.0 - 31.0 pg    MCHC 31.5 (L) 32.0 - 36.0 g/dL    RDW 14.8 (H) 11.5 - 14.5 %    Platelets 204 150 - 450 K/uL    MPV 9.9 9.2 - 12.9 fL    Immature Granulocytes 0.4 0.0 - 0.5 %    Gran # (ANC) 6.7 1.8 - 7.7 K/uL    Immature Grans (Abs) 0.04 0.00 - 0.04 K/uL    Lymph # 1.2 1.0 - 4.8 K/uL    Mono # 0.5 0.3 - 1.0 K/uL    Eos # 0.6 (H) 0.0 - 0.5 K/uL    Baso # 0.06 0.00 - 0.20 K/uL    nRBC 0 0 /100 WBC    Gran %  72.9 38.0 - 73.0 %    Lymph % 13.4 (L) 18.0 - 48.0 %    Mono % 5.6 4.0 - 15.0 %    Eosinophil % 7.0 0.0 - 8.0 %    Basophil % 0.7 0.0 - 1.9 %    Differential Method Automated    Phosphorus    Collection Time: 02/23/22  4:23 AM   Result Value Ref Range    Phosphorus 2.5 (L) 2.7 - 4.5 mg/dL   POCT glucose    Collection Time: 02/23/22  8:16 AM   Result Value Ref Range    POCT Glucose 83 70 - 110 mg/dL   POCT glucose    Collection Time: 02/23/22 11:35 AM   Result Value Ref Range    POCT Glucose 172 (H) 70 - 110 mg/dL   POCT glucose    Collection Time: 02/23/22  3:55 PM   Result Value Ref Range    POCT Glucose 250 (H) 70 - 110 mg/dL         Significant Imaging: I have reviewed all pertinent imaging results/findings within the past 24 hours.    Imaging Results              X-Ray Chest AP Portable (Final result)  Result time 02/19/22 20:32:55      Final result by Azeem Cao MD (02/19/22 20:32:55)                   Impression:      Diffuse interstitial opacities.      Electronically signed by: Azeem Cao MD  Date:    02/19/2022  Time:    20:32               Narrative:    EXAMINATION:  XR CHEST AP PORTABLE    CLINICAL HISTORY:  Sepsis;    TECHNIQUE:  Single frontal view of the chest was performed.    COMPARISON:  01/25/2022.    FINDINGS:  Monitoring EKG leads are present.  There are postoperative changes in the base of the neck.    The trachea is unremarkable.  The cardiomediastinal silhouette is enlarged.  There is no evidence of free air beneath the hemidiaphragms there are no pleural effusions there is no evidence of a pneumothorax.  There is no evidence of pneumomediastinum.  There are diffuse interstitial opacities.  The osseous structures demonstrate degenerative changes.                                       CT Head Without Contrast (Final result)  Result time 02/19/22 20:21:19      Final result by Carli Coulter MD (02/19/22 20:21:19)                   Impression:      Acute to subacute infarcts involving  the right frontal and left cerebellar hemisphere.      Electronically signed by: Carli Coulter  Date:    02/19/2022  Time:    20:21               Narrative:    EXAMINATION:  CT OF THE HEAD WITHOUT    CLINICAL HISTORY:  Mental status change, unknown cause;Recent subacute infarction with possible area of punctate hemorrhage, now confused;    TECHNIQUE:  5 mm unenhanced axial images were obtained from the skull base to the vertex.    COMPARISON:  02/17/2022    FINDINGS:  Stable cerebral atrophy and chronic small vessel ischemic changes are present.  There are areas of infarct involving the right frontal and left cerebellar hemisphere.  There has been no hemorrhagic conversion.  Mild right frontal laminar necrosis is seen.  There is no acute intracranial hemorrhage, territorial infarct or mass effect, or midline shift. In the visualized paranasal sinuses, there is mild left maxillary sinus mucoperiosteal thickening.                                          Assessment/Plan:      * Encephalopathy, metabolic  In the setting of DKA and sepsis upon admission.  Admit CT Head without any concerning new findings- no new infarct as discussed with radiology    Resolved.      Diabetic ketoacidosis with coma associated with type 2 diabetes mellitus  Patient with uncontrolled DM presenting with metabolic encephalopathy in the setting of DKA with coma. Suspect patient developed DKA in the setting of sepsis/ COVID-10   Glucose 1109, pH 7.17, Co2 15.6 HCO3 <5, AG unable to calculate    DKA protocol completed and DKA resolved in MICU and Pt stepped down on subq insulin.    Hypoglycemia episode upon step-down, detemir adjusted from BID to qhs per home long acting insulin and due to episode of hypoglycemia   Continue aspart TIDWM  Diabetic diet  POCT BGx4  Cotinue to titrate short and long acting insulin needs as indicated to in-pt hospital goal 140-180    Acute hypoxemic respiratory failure  In setting of COVID infection. Resolved upon  step-down from MICU. ORA.   maintain SpO2 88-92% given hx of COPD      Paroxysmal atrial fibrillation  History of paroxysmal atrial fibrillation on home  Metoprolol XL 50 mg daily, diltiazem  mg daily and amiodarone 200 mg daily.  - Continue po apixaban  - Continue amiodarone  - metoprolol and diltiazem held in MICU  in the setting of sepsis.   -resume BB and CBB as clinically indicated and tolerated per BP          COVID-19  Viral Pneumonia due to COVID-19  - COVID-19 testing:   -Patient is identified as High risk for severe complications of COVID 19 based on COVID risk score of 8   Initiate standard COVID protocols; COVID-19 testing Collection Date: 8/9/2021 Collection Time:   3:41 PM ,Infection Control notification  and isolation- respiratory, contact and droplet per protocol  COVID vaccinated with booster.   Received 1 dose of sotrovimab infusion 02/18/2022     - Diagnostics: Trend LDH, CRP, Ferritin, D-dimer Q48hrs if stable, more frequently if patient decompensating.     Lymphopenia, hyponatremia, hyperferritinemia, elevated troponin, elevated d-dimer, age, and medical comorbidities are significant predictors of poor clinical outcome     - [facility]: [facility]       - Monitoring:          - Telemetry & Continuous Pulse Oximetry       - Targeted therapy Remdesivir, 200mg IV x1, followed by 100mg IV daily x5 days total,  - Holding dexamethasone PO/IV 6mg daily x10 days in the setting of DKA/HHS.-  -Weaning 02 as tolerated  - Anticoagulation, Patient admitted to critical care up initial presentation and on triple therapy- Will continue home apixaban dose anticoagulation       - Antibiotics:  - if indications, CXR findings, elevated procal. See protocol for alternatives.   - Discontinue early if low concern for bacterial co-infection          - Initiated on IV vanc, zosyn and doxycycline (unable to use azithromycin due to prolong QTc)     - Nutrition:           -  Multivitamin PO daily          - Add Boost supplement          - Vitamin D 1000IU daily if deficient          - Ascorbic acid 500mg PO bid    - Supportive Care:          - acetaminophen 650mg PO Q6hr PRN fever/headache          - loperamide PRN viral diarrhea    Severe sepsis  Upon admission:  Organ dysfunction indicated by Acute kidney injury, Encephalopathy  and Acute respiratory failure  Source- Unclear. CXR with diffuse interstitial opacities, however no consolidation noted. UA with pyuria, without elevated leuks. Blood cultures pending. Possibly due to sacral wound, although doesn't look grossly infected.     Started on broad spectrum abx at admission with vanc/zosyn/doxy.    - vancomycin discontinued today as no MRSA has isolated.  No infectious source found but leukocytosis persists.  Consider de-escalation further based on continued improvement.  Day 3 abx today.    F/u Urine and blood cultures  - Wound care consulted for sacral decubitus wound. Appreciate recs.  -lactic acid overall trend improved    Palliative care status  Per MICU: patient wishes to be DNR/DNI and family agrees. Still want to continue full medical care       Focal seizures  Continue home lancosamide     BRENDAN (acute kidney injury)  sCr 3.1, baseline 1.1-1.3. Likely prerenal in the setting of DKA    Improving with treatment of DKA  - Avoid nephrotoxins, renally dose meds    Embolic stroke involving right middle cerebral artery  Hx of CVA in Jan 2022. CT Head with evolving infarcts in right frontal and left cerebellar hemispheres. No concern for acute stroke per discussion with radiology.      - Continue home antiplatelets, statin and eliquis       Coronary artery disease involving native coronary artery of native heart with unstable angina pectoris  Hx of CAD s/p placement of 2 LAUREN in RCA in 01/09/2022.      - Continue home ASA, plavix and statin    Pulmonary nodules  Has stable bilateral  pulmonary masses which could be malignancy per  outpatient pulmonology notes. No pathology report as none of the nodules appeared to be safe for biopsy given high risk of PTX is high with many adjacent bulla.         Chronic pulmonary heart disease  Follows up with Pulm clinic in [place]. Last seen in December and was evaluated for pulmonary masses. Deemed to have mild COPD per notes. Not on any maintenance therapy.    - Continue albuterol inhaler     Type 2 diabetes mellitus with hyperglycemia, with long-term current use of insulin  See DKA    Hypertension associated with diabetes  resume home antihypertensives as tolerated  Hold losartan in setting of BRENDAN  See pAF      VTE Risk Mitigation (From admission, onward)         Ordered     apixaban tablet 5 mg  2 times daily         02/19/22 2202     IP VTE HIGH RISK PATIENT  Once         02/19/22 2144     Place sequential compression device  Until discontinued         02/19/22 2144                Discharge Planning   ALLIE: 2/24/2022     Code Status: DNR   Is the patient medically ready for discharge?: Yes    Reason for patient still in hospital (select all that apply): Other (specify) placement  Discharge Plan A: Rehab   Discharge Delays: None known at this time              Imelda Rondon MD  Department of Hospital Medicine   [facility] - Intensive Care ([place] [id number])

## 2022-02-23 NOTE — ASSESSMENT & PLAN NOTE
Patient with uncontrolled DM presenting with metabolic encephalopathy in the setting of DKA with coma. Suspect patient developed DKA in the setting of sepsis/ COVID-10   Glucose 1109, pH 7.17, Co2 15.6 HCO3 <5, AG unable to calculate    DKA protocol completed and DKA resolved in MICU and Pt stepped down on subq insulin.    Hypoglycemia episode upon step-down, detemir adjusted from BID to qhs per home long acting insulin and due to episode of hypoglycemia   Continue aspart TIDWM  Diabetic diet  POCT BGx4  Cotinue to titrate short and long acting insulin needs as indicated to in-pt hospital goal 140-180

## 2022-02-23 NOTE — ASSESSMENT & PLAN NOTE
Per MICU: patient wishes to be DNR/DNI and family agrees. Still want to continue full medical care

## 2022-02-24 LAB
ALBUMIN SERPL BCP-MCNC: 1.8 G/DL (ref 3.5–5.2)
ALP SERPL-CCNC: 113 U/L (ref 55–135)
ALT SERPL W/O P-5'-P-CCNC: 13 U/L (ref 10–44)
ANION GAP SERPL CALC-SCNC: 9 MMOL/L (ref 8–16)
AST SERPL-CCNC: 16 U/L (ref 10–40)
BACTERIA BLD CULT: NORMAL
BACTERIA BLD CULT: NORMAL
BASOPHILS # BLD AUTO: 0.05 K/UL (ref 0–0.2)
BASOPHILS NFR BLD: 0.6 % (ref 0–1.9)
BILIRUB SERPL-MCNC: 0.5 MG/DL (ref 0.1–1)
BUN SERPL-MCNC: 19 MG/DL (ref 8–23)
CALCIUM SERPL-MCNC: 8.4 MG/DL (ref 8.7–10.5)
CHLORIDE SERPL-SCNC: 105 MMOL/L (ref 95–110)
CO2 SERPL-SCNC: 25 MMOL/L (ref 23–29)
CREAT SERPL-MCNC: 1.1 MG/DL (ref 0.5–1.4)
DIFFERENTIAL METHOD: ABNORMAL
EOSINOPHIL # BLD AUTO: 0.9 K/UL (ref 0–0.5)
EOSINOPHIL NFR BLD: 10.9 % (ref 0–8)
ERYTHROCYTE [DISTWIDTH] IN BLOOD BY AUTOMATED COUNT: 14.9 % (ref 11.5–14.5)
EST. GFR  (AFRICAN AMERICAN): >60 ML/MIN/1.73 M^2
EST. GFR  (NON AFRICAN AMERICAN): >60 ML/MIN/1.73 M^2
GLUCOSE SERPL-MCNC: 51 MG/DL (ref 70–110)
HCT VFR BLD AUTO: 33.7 % (ref 40–54)
HGB BLD-MCNC: 10.5 G/DL (ref 14–18)
IMM GRANULOCYTES # BLD AUTO: 0.01 K/UL (ref 0–0.04)
IMM GRANULOCYTES NFR BLD AUTO: 0.1 % (ref 0–0.5)
LYMPHOCYTES # BLD AUTO: 1.6 K/UL (ref 1–4.8)
LYMPHOCYTES NFR BLD: 20.3 % (ref 18–48)
MAGNESIUM SERPL-MCNC: 1.6 MG/DL (ref 1.6–2.6)
MCH RBC QN AUTO: 28 PG (ref 27–31)
MCHC RBC AUTO-ENTMCNC: 31.2 G/DL (ref 32–36)
MCV RBC AUTO: 90 FL (ref 82–98)
MONOCYTES # BLD AUTO: 0.5 K/UL (ref 0.3–1)
MONOCYTES NFR BLD: 6.8 % (ref 4–15)
NEUTROPHILS # BLD AUTO: 4.9 K/UL (ref 1.8–7.7)
NEUTROPHILS NFR BLD: 61.3 % (ref 38–73)
NRBC BLD-RTO: 0 /100 WBC
PHOSPHATE SERPL-MCNC: 3.1 MG/DL (ref 2.7–4.5)
PLATELET # BLD AUTO: 184 K/UL (ref 150–450)
PMV BLD AUTO: 9.8 FL (ref 9.2–12.9)
POCT GLUCOSE: 107 MG/DL (ref 70–110)
POCT GLUCOSE: 168 MG/DL (ref 70–110)
POCT GLUCOSE: 184 MG/DL (ref 70–110)
POCT GLUCOSE: 80 MG/DL (ref 70–110)
POTASSIUM SERPL-SCNC: 3.6 MMOL/L (ref 3.5–5.1)
PROT SERPL-MCNC: 5.7 G/DL (ref 6–8.4)
RBC # BLD AUTO: 3.75 M/UL (ref 4.6–6.2)
SODIUM SERPL-SCNC: 139 MMOL/L (ref 136–145)
WBC # BLD AUTO: 7.99 K/UL (ref 3.9–12.7)

## 2022-02-24 PROCEDURE — 20000000 HC ICU ROOM: Mod: HCNC

## 2022-02-24 PROCEDURE — 27000221 HC OXYGEN, UP TO 24 HOURS: Mod: HCNC

## 2022-02-24 PROCEDURE — 27000207 HC ISOLATION: Mod: HCNC

## 2022-02-24 PROCEDURE — 25000003 PHARM REV CODE 250: Mod: HCNC | Performed by: INTERNAL MEDICINE

## 2022-02-24 PROCEDURE — 99232 SBSQ HOSP IP/OBS MODERATE 35: CPT | Mod: HCNC,,, | Performed by: STUDENT IN AN ORGANIZED HEALTH CARE EDUCATION/TRAINING PROGRAM

## 2022-02-24 PROCEDURE — 63600175 PHARM REV CODE 636 W HCPCS: Mod: HCNC | Performed by: STUDENT IN AN ORGANIZED HEALTH CARE EDUCATION/TRAINING PROGRAM

## 2022-02-24 PROCEDURE — 83735 ASSAY OF MAGNESIUM: CPT | Mod: HCNC | Performed by: STUDENT IN AN ORGANIZED HEALTH CARE EDUCATION/TRAINING PROGRAM

## 2022-02-24 PROCEDURE — 25000003 PHARM REV CODE 250: Mod: HCNC | Performed by: STUDENT IN AN ORGANIZED HEALTH CARE EDUCATION/TRAINING PROGRAM

## 2022-02-24 PROCEDURE — 80053 COMPREHEN METABOLIC PANEL: CPT | Mod: HCNC | Performed by: STUDENT IN AN ORGANIZED HEALTH CARE EDUCATION/TRAINING PROGRAM

## 2022-02-24 PROCEDURE — 99232 PR SUBSEQUENT HOSPITAL CARE,LEVL II: ICD-10-PCS | Mod: HCNC,,, | Performed by: STUDENT IN AN ORGANIZED HEALTH CARE EDUCATION/TRAINING PROGRAM

## 2022-02-24 PROCEDURE — 94640 AIRWAY INHALATION TREATMENT: CPT | Mod: HCNC

## 2022-02-24 PROCEDURE — 84100 ASSAY OF PHOSPHORUS: CPT | Mod: HCNC | Performed by: STUDENT IN AN ORGANIZED HEALTH CARE EDUCATION/TRAINING PROGRAM

## 2022-02-24 PROCEDURE — 85025 COMPLETE CBC W/AUTO DIFF WBC: CPT | Mod: HCNC | Performed by: STUDENT IN AN ORGANIZED HEALTH CARE EDUCATION/TRAINING PROGRAM

## 2022-02-24 PROCEDURE — 36415 COLL VENOUS BLD VENIPUNCTURE: CPT | Mod: HCNC | Performed by: STUDENT IN AN ORGANIZED HEALTH CARE EDUCATION/TRAINING PROGRAM

## 2022-02-24 PROCEDURE — 94761 N-INVAS EAR/PLS OXIMETRY MLT: CPT | Mod: HCNC

## 2022-02-24 PROCEDURE — 99900035 HC TECH TIME PER 15 MIN (STAT): Mod: HCNC

## 2022-02-24 PROCEDURE — 63600175 PHARM REV CODE 636 W HCPCS: Mod: HCNC | Performed by: INTERNAL MEDICINE

## 2022-02-24 RX ORDER — OXYCODONE HYDROCHLORIDE 5 MG/1
5 TABLET ORAL EVERY 6 HOURS PRN
Status: DISCONTINUED | OUTPATIENT
Start: 2022-02-24 | End: 2022-03-10 | Stop reason: HOSPADM

## 2022-02-24 RX ADMIN — INSULIN ASPART 5 UNITS: 100 INJECTION, SOLUTION INTRAVENOUS; SUBCUTANEOUS at 05:02

## 2022-02-24 RX ADMIN — MUPIROCIN: 20 OINTMENT TOPICAL at 09:02

## 2022-02-24 RX ADMIN — OXYCODONE HYDROCHLORIDE AND ACETAMINOPHEN 500 MG: 500 TABLET ORAL at 09:02

## 2022-02-24 RX ADMIN — INSULIN ASPART 5 UNITS: 100 INJECTION, SOLUTION INTRAVENOUS; SUBCUTANEOUS at 12:02

## 2022-02-24 RX ADMIN — METOPROLOL TARTRATE 25 MG: 25 TABLET, FILM COATED ORAL at 09:02

## 2022-02-24 RX ADMIN — APIXABAN 5 MG: 5 TABLET, FILM COATED ORAL at 09:02

## 2022-02-24 RX ADMIN — MULTIPLE VITAMINS W/ MINERALS TAB 1 TABLET: TAB at 09:02

## 2022-02-24 RX ADMIN — ALBUTEROL SULFATE 2 PUFF: 108 INHALANT RESPIRATORY (INHALATION) at 12:02

## 2022-02-24 RX ADMIN — PANTOPRAZOLE SODIUM 40 MG: 40 TABLET, DELAYED RELEASE ORAL at 09:02

## 2022-02-24 RX ADMIN — INSULIN ASPART 2 UNITS: 100 INJECTION, SOLUTION INTRAVENOUS; SUBCUTANEOUS at 05:02

## 2022-02-24 RX ADMIN — PIPERACILLIN AND TAZOBACTAM 4.5 G: 4; .5 INJECTION, POWDER, LYOPHILIZED, FOR SOLUTION INTRAVENOUS; PARENTERAL at 12:02

## 2022-02-24 RX ADMIN — PIPERACILLIN AND TAZOBACTAM 4.5 G: 4; .5 INJECTION, POWDER, LYOPHILIZED, FOR SOLUTION INTRAVENOUS; PARENTERAL at 09:02

## 2022-02-24 RX ADMIN — ASPIRIN 81 MG: 81 TABLET, COATED ORAL at 09:02

## 2022-02-24 RX ADMIN — DOXYCYCLINE HYCLATE 100 MG: 100 TABLET, COATED ORAL at 09:02

## 2022-02-24 RX ADMIN — ALBUTEROL SULFATE 2 PUFF: 108 INHALANT RESPIRATORY (INHALATION) at 07:02

## 2022-02-24 RX ADMIN — PIPERACILLIN AND TAZOBACTAM 4.5 G: 4; .5 INJECTION, POWDER, LYOPHILIZED, FOR SOLUTION INTRAVENOUS; PARENTERAL at 05:02

## 2022-02-24 RX ADMIN — LACOSAMIDE 100 MG: 100 TABLET, FILM COATED ORAL at 09:02

## 2022-02-24 RX ADMIN — REMDESIVIR 100 MG: 100 INJECTION, POWDER, LYOPHILIZED, FOR SOLUTION INTRAVENOUS at 10:02

## 2022-02-24 RX ADMIN — CLOPIDOGREL 75 MG: 75 TABLET, FILM COATED ORAL at 09:02

## 2022-02-24 RX ADMIN — OXYCODONE 5 MG: 5 TABLET ORAL at 03:02

## 2022-02-24 RX ADMIN — ATORVASTATIN CALCIUM 40 MG: 20 TABLET, FILM COATED ORAL at 09:02

## 2022-02-24 RX ADMIN — AMIODARONE HYDROCHLORIDE 200 MG: 200 TABLET ORAL at 09:02

## 2022-02-24 NOTE — PLAN OF CARE
Patient's pox 93% on 1L O2 this AM & 99% on 2L O2 this afternoon. IRF referrals previously sent. Awaiting acceptance. Previously scheduled appointment with Dr. Isidra Kumar on 2/24/2022 at 1000 rescheduled for 3/21/2022 at 1100 due to the patient's continued hospitalization. Patient added to the wait list & will be notified if a sooner appointment becomes available.      Will continue to follow.

## 2022-02-24 NOTE — PLAN OF CARE
JASIEL faxed updated clinicals for Rehab. JASIEL will follow.    9:09 AM  Spoke with [facility] who says they spoke with the patients daughter on yesterday and that [facility] will be out today to meet with the patient. Still pending acceptance.    Marley Gregg LMSW   - [facility]  Ext. [phone]

## 2022-02-24 NOTE — PHYSICIAN QUERY
PT Name: Kamar Muñoz  MR #: [id number]    DOCUMENTATION CLARIFICATION      CDS/: Aric Lynn RN, CCDS        Contact information:    [email or web address]        This form is a permanent document in the medical record.     Query Date: February 24, 2022    By submitting this query, we are merely seeking further clarification of documentation. Please utilize your independent clinical judgment when addressing the question(s) below.    The Medical Record contains the following:   Indicators   Supporting Clinical Findings Location in Medical Record   x Hypertension associated with diabetes documented Hypertension associated with diabetes  resume home antihypertensives as tolerated  Hold losartan in setting of BRENDAN  See pAF     2/23 Hospital medicine   x Chronic condition(s) Type 2 diabetes mellitus with hyperglycemia, with long-term current use of insulin;  Chronic pulmonary heart disease;  Coronary artery disease involving native coronary artery of native heart with unstable angina pectoris;  Severe sepsis;   2/23 Hospital medicine   x Vital signs BP: (100-193)/(54-91) 114/55; Pulse:  [110-128] 116    BP: (114-141)/() 119/73; Pulse:  [64-90] 72     BP: (119-145)/(64-80) 145/76; Pulse:  [66-91] 72   2/20 H&P, Critical Care Med    2/22 Hospital medicine    Treatment/Medication     x Other 2/19 Glucose: 1109  2/20 Glucose: 727  2/21  Glucose: 272  Labs      Provider, please clarify the relationship between the Hypertension and Diabetes Mellitus    [   ] Hypertension is a manifestation of Diabetes Mellitus (Secondary Hypertension)   [   ]  Hypertension is not a manifestation of Diabetes Mellitus (Essential Hypertension)   [   ] Other Clarification (please specify): ____________   [ x ] Clinically Undetermined       Please document in your progress notes daily for the duration of treatment, until resolved, and include in your discharge summary.                                                          fever

## 2022-02-24 NOTE — PT/OT/SLP PROGRESS
Occupational Therapy      Patient Name:  Kamar Muñoz   MRN:  [id number]    Patient not seen today secondary to Patient unwilling to participate. AM attempt patient complaining of nausea, nurse notified. PM attempt, patient complaining of pain to back/buttocks. Education provided on importance of OOB activity, continues to decline. Will follow-up on next available date as appropriate.    2/24/2022

## 2022-02-24 NOTE — PT/OT/SLP PROGRESS
"Occupational Therapy   Treatment    Name: Kamar Muñoz  MRN: [id number]  Admitting Diagnosis:  Encephalopathy, metabolic       Recommendations:     Discharge Recommendations: rehabilitation facility  Discharge Equipment Recommendations:  other (see comments)  Barriers to discharge:  Decreased caregiver support     Assessment:     Kamar Muñoz is a 76 y.o. male with a medical diagnosis of Encephalopathy, metabolic.  He presents with the following performance deficits affecting function are weakness, impaired endurance, impaired self care skills, impaired functional mobilty, gait instability, impaired balance, decreased lower extremity function, decreased safety awareness, impaired coordination, impaired cardiopulmonary response to activity, impaired skin. Patient participated well with therapy, continues to demonstrate progress with functional mobility and activity tolerance; required cueing for safety with rolling walker management; patient is very motivated to participate, will continue to benefit from therapy during this admission and intensive and collaborative therapy in rehabilitation facility to maximize functional independence.    Rehab Prognosis:  Good; patient would benefit from acute skilled OT services to address these deficits and reach maximum level of function.       Plan:     Patient to be seen 4 x/week to address the above listed problems via self-care/home management, therapeutic activities, therapeutic exercises, neuromuscular re-education  · Plan of Care Expires: 03/22/22  · Plan of Care Reviewed with: patient    Subjective   "Yes let's do it!" re: patient reporting fatigue, however very motivated and wanting to complete mobility    Pain/Comfort:  · Pain Rating 1:  (Did not rate)  · Location - Orientation 1: generalized  · Location 1: abdomen  · Pain Addressed 1: Reposition, Distraction  · Pain Rating Post-Intervention 1:  (Did not rate)    Objective:   Communicated with: Nursing prior to " session.  Patient found supine with bed alarm upon OT entry to room.    General Precautions: Standard, airborne, contact, droplet, fall   Orthopedic Precautions:N/A   Braces: N/A  Respiratory Status: Room air     Occupational Performance:     Bed Mobility:    · Patient completed Rolling/Turning to Right with minimum assistance  · Patient completed Scooting/Bridging with minimum assistance  · Patient completed Supine to Sit with minimum assistance, from right side of bed with cues for sequencing     Functional Mobility/Transfers:  · Patient completed Sit <> Stand Transfer with contact guard assistance  with  rolling walker   · Patient completed Bed <> Chair Transfer using Step Transfer technique with contact guard assistance with rolling walker  · Functional Mobility: Contact guard assistance with rolling walker within room (~20 feet); required cueing for walker management and step sequence with chair follow    Activities of Daily Living:  · Grooming: stand by assistance seated edge of bed  · Upper Body Dressing: minimum assistance seated in bedside chair  · Lower Body Dressing: moderate assistance seated edge of bed    AMPAC 6 Click ADL: 14    Treatment & Education:  -Pt completed x 10 shoulder elevation/retraction, educated on postural awareness/breathing techniques  -Patient and family educated on roles/goals of OT and POC.  -White board updated.  -Therapist provided time for questions and answered within scope of practice.  -Patient educated on importance of EOB/OOB activity to maximize recovery.    Patient left up in chair with all lines intact and call button in reachEducation:      GOALS:   Multidisciplinary Problems     Occupational Therapy Goals        Problem: Occupational Therapy Goal    Goal Priority Disciplines Outcome Interventions   Occupational Therapy Goal     OT, PT/OT Ongoing, Progressing    Description: Goals to be met by: 3/6/22     Patient will increase functional independence with ADLs by  performing:    Feeding with [place].  UE Dressing with Stand-by Assistance.  LE Dressing with Minimal Assistance.  Grooming while standing with Stand-by Assistance.  Toileting from bedside commode with Stand-by Assistance for hygiene and clothing management.   Toilet transfer to bedside commode with Stand-by Assistance.                     Time Tracking:   OT Date of Treatment: 02/23/22  OT Start Time: 1052  OT Stop Time: 1117  OT Total Time (min): 25 min    Billable Minutes:Self Care/Home Management 12  Therapeutic Activity 15    OT/JUAN: OT          2/23/2022

## 2022-02-24 NOTE — PLAN OF CARE
Problem: Adult Inpatient Plan of Care  Goal: Plan of Care Review  Outcome: Ongoing, Progressing  Goal: Patient-Specific Goal (Individualized)  Outcome: Ongoing, Progressing  Goal: Absence of Hospital-Acquired Illness or Injury  Outcome: Ongoing, Progressing  Goal: Optimal Comfort and Wellbeing  Outcome: Ongoing, Progressing  Goal: Readiness for Transition of Care  Outcome: Ongoing, Progressing     Problem: Diabetes Comorbidity  Goal: Blood Glucose Level Within Targeted Range  Outcome: Ongoing, Progressing     Problem: Fluid and Electrolyte Imbalance (Acute Kidney Injury/Impairment)  Goal: Fluid and Electrolyte Balance  Outcome: Ongoing, Progressing     Problem: Oral Intake Inadequate (Acute Kidney Injury/Impairment)  Goal: Optimal Nutrition Intake  Outcome: Ongoing, Progressing     Problem: Renal Function Impairment (Acute Kidney Injury/Impairment)  Goal: Effective Renal Function  Outcome: Ongoing, Progressing     Problem: Impaired Wound Healing  Goal: Optimal Wound Healing  Outcome: Ongoing, Progressing     Problem: Fall Injury Risk  Goal: Absence of Fall and Fall-Related Injury  Outcome: Ongoing, Progressing     Problem: Restraint, Nonbehavioral (Nonviolent)  Goal: Absence of Harm or Injury  Outcome: Ongoing, Progressing     Problem: Infection  Goal: Absence of Infection Signs and Symptoms  Outcome: Ongoing, Progressing     Problem: Adjustment to Illness (Sepsis/Septic Shock)  Goal: Optimal Coping  Outcome: Ongoing, Progressing     Problem: Bleeding (Sepsis/Septic Shock)  Goal: Absence of Bleeding  Outcome: Ongoing, Progressing     Problem: Glycemic Control Impaired (Sepsis/Septic Shock)  Goal: Blood Glucose Level Within Desired Range  Outcome: Ongoing, Progressing     Problem: Infection Progression (Sepsis/Septic Shock)  Goal: Absence of Infection Signs and Symptoms  Outcome: Ongoing, Progressing     Problem: Nutrition Impaired (Sepsis/Septic Shock)  Goal: Optimal Nutrition Intake  Outcome: Ongoing,  Progressing     Problem: Skin Injury Risk Increased  Goal: Skin Health and Integrity  Outcome: Ongoing, Progressing     No changes overnight, remains on RA, IV abx received, safety checks performed throughout shift, POC continues

## 2022-02-25 LAB
ALBUMIN SERPL BCP-MCNC: 1.8 G/DL (ref 3.5–5.2)
ALP SERPL-CCNC: 113 U/L (ref 55–135)
ALT SERPL W/O P-5'-P-CCNC: 11 U/L (ref 10–44)
ANION GAP SERPL CALC-SCNC: 11 MMOL/L (ref 8–16)
AST SERPL-CCNC: 15 U/L (ref 10–40)
BASOPHILS # BLD AUTO: 0.07 K/UL (ref 0–0.2)
BASOPHILS NFR BLD: 0.7 % (ref 0–1.9)
BILIRUB SERPL-MCNC: 0.6 MG/DL (ref 0.1–1)
BUN SERPL-MCNC: 18 MG/DL (ref 8–23)
CALCIUM SERPL-MCNC: 8 MG/DL (ref 8.7–10.5)
CHLORIDE SERPL-SCNC: 103 MMOL/L (ref 95–110)
CO2 SERPL-SCNC: 22 MMOL/L (ref 23–29)
CREAT SERPL-MCNC: 1 MG/DL (ref 0.5–1.4)
D DIMER PPP IA.FEU-MCNC: 0.47 MG/L FEU
DIFFERENTIAL METHOD: ABNORMAL
EOSINOPHIL # BLD AUTO: 0.8 K/UL (ref 0–0.5)
EOSINOPHIL NFR BLD: 7.8 % (ref 0–8)
ERYTHROCYTE [DISTWIDTH] IN BLOOD BY AUTOMATED COUNT: 14.9 % (ref 11.5–14.5)
EST. GFR  (AFRICAN AMERICAN): >60 ML/MIN/1.73 M^2
EST. GFR  (NON AFRICAN AMERICAN): >60 ML/MIN/1.73 M^2
GLUCOSE SERPL-MCNC: 230 MG/DL (ref 70–110)
HCT VFR BLD AUTO: 38.1 % (ref 40–54)
HGB BLD-MCNC: 11.7 G/DL (ref 14–18)
IMM GRANULOCYTES # BLD AUTO: 0.05 K/UL (ref 0–0.04)
IMM GRANULOCYTES NFR BLD AUTO: 0.5 % (ref 0–0.5)
LDH SERPL L TO P-CCNC: 279 U/L (ref 110–260)
LYMPHOCYTES # BLD AUTO: 1.3 K/UL (ref 1–4.8)
LYMPHOCYTES NFR BLD: 12.6 % (ref 18–48)
MAGNESIUM SERPL-MCNC: 1.5 MG/DL (ref 1.6–2.6)
MCH RBC QN AUTO: 27.6 PG (ref 27–31)
MCHC RBC AUTO-ENTMCNC: 30.7 G/DL (ref 32–36)
MCV RBC AUTO: 90 FL (ref 82–98)
MONOCYTES # BLD AUTO: 0.6 K/UL (ref 0.3–1)
MONOCYTES NFR BLD: 6.2 % (ref 4–15)
NEUTROPHILS # BLD AUTO: 7.2 K/UL (ref 1.8–7.7)
NEUTROPHILS NFR BLD: 72.2 % (ref 38–73)
NRBC BLD-RTO: 0 /100 WBC
PHOSPHATE SERPL-MCNC: 2.9 MG/DL (ref 2.7–4.5)
PLATELET # BLD AUTO: 166 K/UL (ref 150–450)
PMV BLD AUTO: 9.7 FL (ref 9.2–12.9)
POCT GLUCOSE: 231 MG/DL (ref 70–110)
POCT GLUCOSE: 266 MG/DL (ref 70–110)
POCT GLUCOSE: 273 MG/DL (ref 70–110)
POCT GLUCOSE: 286 MG/DL (ref 70–110)
POTASSIUM SERPL-SCNC: 4.4 MMOL/L (ref 3.5–5.1)
PROT SERPL-MCNC: 5.8 G/DL (ref 6–8.4)
RBC # BLD AUTO: 4.24 M/UL (ref 4.6–6.2)
SODIUM SERPL-SCNC: 136 MMOL/L (ref 136–145)
WBC # BLD AUTO: 9.9 K/UL (ref 3.9–12.7)

## 2022-02-25 PROCEDURE — 84100 ASSAY OF PHOSPHORUS: CPT | Mod: HCNC | Performed by: STUDENT IN AN ORGANIZED HEALTH CARE EDUCATION/TRAINING PROGRAM

## 2022-02-25 PROCEDURE — 25000003 PHARM REV CODE 250: Mod: HCNC | Performed by: STUDENT IN AN ORGANIZED HEALTH CARE EDUCATION/TRAINING PROGRAM

## 2022-02-25 PROCEDURE — 97110 THERAPEUTIC EXERCISES: CPT | Mod: HCNC

## 2022-02-25 PROCEDURE — 94761 N-INVAS EAR/PLS OXIMETRY MLT: CPT | Mod: HCNC

## 2022-02-25 PROCEDURE — 99233 SBSQ HOSP IP/OBS HIGH 50: CPT | Mod: HCNC,,, | Performed by: STUDENT IN AN ORGANIZED HEALTH CARE EDUCATION/TRAINING PROGRAM

## 2022-02-25 PROCEDURE — 94640 AIRWAY INHALATION TREATMENT: CPT | Mod: HCNC

## 2022-02-25 PROCEDURE — 80053 COMPREHEN METABOLIC PANEL: CPT | Mod: HCNC | Performed by: STUDENT IN AN ORGANIZED HEALTH CARE EDUCATION/TRAINING PROGRAM

## 2022-02-25 PROCEDURE — 25000003 PHARM REV CODE 250: Mod: HCNC | Performed by: INTERNAL MEDICINE

## 2022-02-25 PROCEDURE — 83735 ASSAY OF MAGNESIUM: CPT | Mod: HCNC | Performed by: STUDENT IN AN ORGANIZED HEALTH CARE EDUCATION/TRAINING PROGRAM

## 2022-02-25 PROCEDURE — 20000000 HC ICU ROOM: Mod: HCNC

## 2022-02-25 PROCEDURE — 83615 LACTATE (LD) (LDH) ENZYME: CPT | Mod: HCNC | Performed by: STUDENT IN AN ORGANIZED HEALTH CARE EDUCATION/TRAINING PROGRAM

## 2022-02-25 PROCEDURE — 99900035 HC TECH TIME PER 15 MIN (STAT): Mod: HCNC

## 2022-02-25 PROCEDURE — 82728 ASSAY OF FERRITIN: CPT | Mod: HCNC | Performed by: STUDENT IN AN ORGANIZED HEALTH CARE EDUCATION/TRAINING PROGRAM

## 2022-02-25 PROCEDURE — 97530 THERAPEUTIC ACTIVITIES: CPT | Mod: HCNC

## 2022-02-25 PROCEDURE — 36415 COLL VENOUS BLD VENIPUNCTURE: CPT | Mod: HCNC | Performed by: STUDENT IN AN ORGANIZED HEALTH CARE EDUCATION/TRAINING PROGRAM

## 2022-02-25 PROCEDURE — 85025 COMPLETE CBC W/AUTO DIFF WBC: CPT | Mod: HCNC | Performed by: STUDENT IN AN ORGANIZED HEALTH CARE EDUCATION/TRAINING PROGRAM

## 2022-02-25 PROCEDURE — 27000221 HC OXYGEN, UP TO 24 HOURS: Mod: HCNC

## 2022-02-25 PROCEDURE — 27000207 HC ISOLATION: Mod: HCNC

## 2022-02-25 PROCEDURE — 63600175 PHARM REV CODE 636 W HCPCS: Mod: HCNC | Performed by: INTERNAL MEDICINE

## 2022-02-25 PROCEDURE — 85379 FIBRIN DEGRADATION QUANT: CPT | Mod: HCNC | Performed by: STUDENT IN AN ORGANIZED HEALTH CARE EDUCATION/TRAINING PROGRAM

## 2022-02-25 PROCEDURE — 99233 PR SUBSEQUENT HOSPITAL CARE,LEVL III: ICD-10-PCS | Mod: HCNC,,, | Performed by: STUDENT IN AN ORGANIZED HEALTH CARE EDUCATION/TRAINING PROGRAM

## 2022-02-25 RX ORDER — DICLOFENAC SODIUM 10 MG/G
4 GEL TOPICAL 4 TIMES DAILY
Status: DISCONTINUED | OUTPATIENT
Start: 2022-02-25 | End: 2022-02-28

## 2022-02-25 RX ORDER — ALBUTEROL SULFATE 90 UG/1
2 AEROSOL, METERED RESPIRATORY (INHALATION) EVERY 4 HOURS
Status: DISCONTINUED | OUTPATIENT
Start: 2022-02-25 | End: 2022-02-27

## 2022-02-25 RX ORDER — PANTOPRAZOLE SODIUM 40 MG/1
40 TABLET, DELAYED RELEASE ORAL 2 TIMES DAILY
Status: DISCONTINUED | OUTPATIENT
Start: 2022-02-25 | End: 2022-03-10 | Stop reason: HOSPADM

## 2022-02-25 RX ORDER — LOPERAMIDE HYDROCHLORIDE 2 MG/1
2 CAPSULE ORAL 4 TIMES DAILY PRN
Status: DISCONTINUED | OUTPATIENT
Start: 2022-02-25 | End: 2022-02-27

## 2022-02-25 RX ORDER — CALCIUM CARBONATE 200(500)MG
500 TABLET,CHEWABLE ORAL 2 TIMES DAILY PRN
Status: DISCONTINUED | OUTPATIENT
Start: 2022-02-25 | End: 2022-02-25

## 2022-02-25 RX ORDER — DOXYCYCLINE HYCLATE 100 MG
100 TABLET ORAL EVERY 12 HOURS
Status: DISCONTINUED | OUTPATIENT
Start: 2022-02-25 | End: 2022-02-25

## 2022-02-25 RX ORDER — CALCIUM CARBONATE 200(500)MG
500 TABLET,CHEWABLE ORAL 3 TIMES DAILY PRN
Status: DISCONTINUED | OUTPATIENT
Start: 2022-02-25 | End: 2022-03-10 | Stop reason: HOSPADM

## 2022-02-25 RX ADMIN — LACOSAMIDE 100 MG: 100 TABLET, FILM COATED ORAL at 09:02

## 2022-02-25 RX ADMIN — ALBUTEROL SULFATE 2 PUFF: 90 AEROSOL, METERED RESPIRATORY (INHALATION) at 09:02

## 2022-02-25 RX ADMIN — PIPERACILLIN AND TAZOBACTAM 4.5 G: 4; .5 INJECTION, POWDER, LYOPHILIZED, FOR SOLUTION INTRAVENOUS; PARENTERAL at 05:02

## 2022-02-25 RX ADMIN — INSULIN ASPART 6 UNITS: 100 INJECTION, SOLUTION INTRAVENOUS; SUBCUTANEOUS at 05:02

## 2022-02-25 RX ADMIN — CLOPIDOGREL 75 MG: 75 TABLET, FILM COATED ORAL at 09:02

## 2022-02-25 RX ADMIN — OXYCODONE HYDROCHLORIDE AND ACETAMINOPHEN 500 MG: 500 TABLET ORAL at 09:02

## 2022-02-25 RX ADMIN — ASPIRIN 81 MG: 81 TABLET, COATED ORAL at 09:02

## 2022-02-25 RX ADMIN — INSULIN ASPART 5 UNITS: 100 INJECTION, SOLUTION INTRAVENOUS; SUBCUTANEOUS at 09:02

## 2022-02-25 RX ADMIN — DOXYCYCLINE HYCLATE 100 MG: 100 TABLET, COATED ORAL at 09:02

## 2022-02-25 RX ADMIN — PANTOPRAZOLE SODIUM 40 MG: 40 TABLET, DELAYED RELEASE ORAL at 09:02

## 2022-02-25 RX ADMIN — CALCIUM CARBONATE (ANTACID) CHEW TAB 500 MG 500 MG: 500 CHEW TAB at 03:02

## 2022-02-25 RX ADMIN — DICLOFENAC SODIUM 4 G: 10 GEL TOPICAL at 02:02

## 2022-02-25 RX ADMIN — AMIODARONE HYDROCHLORIDE 200 MG: 200 TABLET ORAL at 09:02

## 2022-02-25 RX ADMIN — OXYCODONE 5 MG: 5 TABLET ORAL at 06:02

## 2022-02-25 RX ADMIN — MULTIPLE VITAMINS W/ MINERALS TAB 1 TABLET: TAB at 09:02

## 2022-02-25 RX ADMIN — INSULIN ASPART 6 UNITS: 100 INJECTION, SOLUTION INTRAVENOUS; SUBCUTANEOUS at 01:02

## 2022-02-25 RX ADMIN — ALBUTEROL SULFATE 2 PUFF: 108 INHALANT RESPIRATORY (INHALATION) at 12:02

## 2022-02-25 RX ADMIN — INSULIN ASPART 5 UNITS: 100 INJECTION, SOLUTION INTRAVENOUS; SUBCUTANEOUS at 01:02

## 2022-02-25 RX ADMIN — LOPERAMIDE HYDROCHLORIDE 2 MG: 2 CAPSULE ORAL at 03:02

## 2022-02-25 RX ADMIN — APIXABAN 5 MG: 5 TABLET, FILM COATED ORAL at 09:02

## 2022-02-25 RX ADMIN — INSULIN ASPART 6 UNITS: 100 INJECTION, SOLUTION INTRAVENOUS; SUBCUTANEOUS at 09:02

## 2022-02-25 RX ADMIN — ALBUTEROL SULFATE 2 PUFF: 108 INHALANT RESPIRATORY (INHALATION) at 07:02

## 2022-02-25 RX ADMIN — DICLOFENAC SODIUM 4 G: 10 GEL TOPICAL at 04:02

## 2022-02-25 RX ADMIN — ATORVASTATIN CALCIUM 40 MG: 20 TABLET, FILM COATED ORAL at 09:02

## 2022-02-25 RX ADMIN — METOPROLOL TARTRATE 25 MG: 25 TABLET, FILM COATED ORAL at 09:02

## 2022-02-25 RX ADMIN — INSULIN ASPART 2 UNITS: 100 INJECTION, SOLUTION INTRAVENOUS; SUBCUTANEOUS at 09:02

## 2022-02-25 RX ADMIN — DICLOFENAC SODIUM 4 G: 10 GEL TOPICAL at 09:02

## 2022-02-25 RX ADMIN — INSULIN ASPART 5 UNITS: 100 INJECTION, SOLUTION INTRAVENOUS; SUBCUTANEOUS at 05:02

## 2022-02-25 NOTE — PT/OT/SLP PROGRESS
Occupational Therapy   Treatment    Name: Kamar Muñoz  MRN: [id number]  Admitting Diagnosis:  Encephalopathy, metabolic       Recommendations:     Discharge Recommendations: rehabilitation facility  Discharge Equipment Recommendations:  other (see comments)  Barriers to discharge:   N/A    Assessment:     Kamar Muñoz is a 76 y.o. male with a medical diagnosis of Encephalopathy, metabolic.  He presents with  generalized weakness during today's session. Pt was agreeable to EOB session deferring OOB  activities 2* fatigue.  Pt requires SBA for EOB UE/ LE exercises to maintain balance with unsupported UE sitting.  Performance deficits affecting function are weakness, impaired endurance, impaired self care skills, decreased lower extremity function.     Rehab Prognosis:  Good; patient would benefit from acute skilled OT services to address these deficits and reach maximum level of function.       Plan:     Patient to be seen 4 x/week to address the above listed problems via self-care/home management, therapeutic activities, therapeutic exercises  · Plan of Care Expires: 03/22/22  · Plan of Care Reviewed with: patient    Subjective     Pain/Comfort:  · Pain Rating 1: 0/10    Objective:     Communicated with: RN prior to session.  Patient found HOB elevated with telemetry, oxygen, pulse ox (continuous), lopez catheter upon OT entry to room.    General Precautions: Standard, fall, droplet, contact, airborne   Orthopedic Precautions:N/A   Braces: N/A  Respiratory Status: Nasal cannula, flow 2 L/min     Occupational Performance:     Bed Mobility:    · Patient completed Rolling/Turning to Left with  minimum assistance  · Patient completed Rolling/Turning to Right with minimum assistance  · Patient completed Scooting/Bridging with contact guard assistance  · Patient completed Supine to Sit with minimum assistance  · Patient completed Sit to Supine with minimum assistance for leg lift into bed.      Functional  Mobility/Transfers:  · No functional mobility/ transfers observed 2* Pt refusal to complete OOB activity.    Activities of Daily Living:  · No ADLs observed, Pt notes ADL performance completed earlier.      [facility] 6 Click ADL: 16    Treatment & Education:  BUE Exercises to increase strength/ROM (10x Shoulder flexion, 10x biceps curls)  LUE Exercises to increase ROM and maintain strength in LE (10x knee raises) with CGA.  Educated Pt on role of OT and OT continued POC  Educated on OOB activity and impact on increasing functional independence.    Patient left HOB elevated with all lines intact and call button in reachEducation:      GOALS:   Multidisciplinary Problems     Occupational Therapy Goals        Problem: Occupational Therapy Goal    Goal Priority Disciplines Outcome Interventions   Occupational Therapy Goal     OT, PT/OT Ongoing, Progressing    Description: Goals to be met by: 3/6/22     Patient will increase functional independence with ADLs by performing:    Feeding with [place].  UE Dressing with Stand-by Assistance.  LE Dressing with Minimal Assistance.  Grooming while standing with Stand-by Assistance.  Toileting from bedside commode with Stand-by Assistance for hygiene and clothing management.   Toilet transfer to bedside commode with Stand-by Assistance.                     Time Tracking:     OT Date of Treatment: 02/25/22  OT Start Time: 1242  OT Stop Time: 1255  OT Total Time (min): 13 min    Billable Minutes:Therapeutic Exercise 13    OT/JUAN: OT          2/25/2022

## 2022-02-25 NOTE — PT/OT/SLP PROGRESS
"Physical Therapy      Patient Name:  Kamar Muñoz   MRN:  [id number]    Patient not seen today secondary to Patient unwilling to participate. Patient politely declined to participate due to pain in sacral wound. Reports "I want to but I can't do it" and "I'll do it when I can." Significant time spent educating patient about options for mobility, importance of mobility and OOB activity, and risks of remaining in bed for prolonged periods of time. Patient verbalized understanding and reported "I can't do it right now". PT present from [id number], billing one unit of therapeutic activity for education. Will follow-up as appropriate.        "

## 2022-02-25 NOTE — PLAN OF CARE
[facility] ([facility])  Discharge Reassessment    Primary Care Provider: Basim Guerrero MD    Expected Discharge Date: 2/26/2022    Reassessment (most recent)       Discharge Reassessment - 02/25/22 1332          Discharge Reassessment    Assessment Type Discharge Planning Reassessment     Discharge Plan A Rehab     Discharge Plan B Skilled Nursing Facility     DME Needed Upon Discharge  none     Discharge Barriers Identified None

## 2022-02-25 NOTE — PROGRESS NOTES
[facility] ([address] Medicine  Progress Note    Patient Name: Kamar Muñoz  MRN: [id number]  Patient Class: IP- Inpatient   Admission Date: 2/19/2022  Length of Stay: 6 days  Attending Physician: Kelin Catalan MD  Primary Care Provider: Basim Guerrero MD        Subjective:     Principal Problem:Encephalopathy, metabolic        HPI:  Kamar Muñoz is a 76 year old male with past medical history of CAD s/p 2 LAUREN in RCA (Jan 2022), HTN, HLD, p. A. 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 History was not obtained from patient due to encephalopathy. Patient's daughter at bedside reports the patient was recently in the ED on 2/17 for a mechanical fall after being discharged from rehab the same day. CTH and cervical spine revealed  evolving late subacute infarct involving the right frontal lobe with questionable petechial hemorrhage. Vascular neurology evaluated the patient and cleared him for discharge from without any new interventions. He was also tested positive for COVID-19 and was discharged yesterday from the ED. He subsequently received one dose of sotrovimab infusion for COVID and was in a normal state of health until this morning when his daughter found him lethargic and barely responsive prompting her to bring him to the ED. She reports the patient had no complaints prior to developing his AMS. Of note, the patient was recently admitted to [facility] from 01/25 through 02/13 for AMS concerning for CVA, however he was treated for  metabolic encephalopathy 2/2 to DKA/HHS, sepsis and BRENDAN.      Upon arrival to the ED, he was placed on 6L NC, normotensive and tachycardic. Lactic 11.5, WBC 17.8, Glucose 1109, pH 7.17, Co2 15.6 HCO3 <5, AG unable to calculate. sCr 3.1. CXR with intersitial opacities. He received ~4L of IVF, vancomycin, zosyn, initiated on insulin shifted for hyperkalemia and calcium for cardioprotection. ECG without noticeable ischemic changes. CTH without new changes- discussed findings with radiology. Patient was admitted to the MICU for further management.        Overview/Hospital Course:  MICU Course:  Placed on remdesivir and broad spectrum IV abx in addition to insulin per DKA protocol. In MICU: Weaned supp O2 to room air; Transitioned to subq insulin; Improvement of AMS. BRENDAN improving gradually. Pt had discussion w/ family in MICU and transitioned from full code to DNR/DNI.  Step down to HM initiated 2/21.  Pt w/ episode of CP and hypoglycemia upon stepdown. Ischemic work-up largely unremarkable with trop down trending from admission. Detemir dosing adjusted from BID to qhs.     Pt was found to have St 3 sacral pressure ulcer.       Interval History: Patient was seen and examined this am. Reports pain of the buttocks at sacral ulcer. No chest pain, nausea, vomiting, sob, fevers, chills, night sweats.     Objective:     Vital Signs (Most Recent):  Temp: 97.3 °F (36.3 °C) (02/25/22 0009)  Pulse: 71 (02/25/22 0104)  Resp: 16 (02/25/22 0104)  BP: 134/66 (02/25/22 0009)  SpO2: 97 % (02/25/22 0104) Vital Signs (24h Range):  Temp:  [97.3 °F (36.3 °C)-98.7 °F (37.1 °C)] 97.3 °F (36.3 °C)  Pulse:  [59-81] 71  Resp:  [16-20] 16  SpO2:  [91 %-99 %] 97 %  BP: (107-143)/(54-74) 134/66     Weight: 76.4 kg (168 lb 6.9 oz)  Body mass index is 21.63 kg/m².    Intake/Output Summary (Last 24 hours) at 2/25/2022 0126  Last data filed at 2/24/2022 1800  Gross per 24 hour   Intake 2799.36 ml   Output 1375 ml   Net 1424.36 ml      Physical  Exam  Gen: in NAD, appears stated age  Neuro: AAOx3, motor, sensory, and strength grossly intact BL  HEENT: NTNC, EOMI, PERRL, MMM  CVS: RRR, no m/r/g; S1/S2 auscultated with no S3 or S4; capillary refill < 2 sec  Resp: bibasilar crackles, 2L BNC, no wheezes, no belabored breathing or accessory muscle use appreciated   Abd: BS+ in all 4 quadrants; NTND, soft to palpation; no organomegaly appreciated   Extrem: pulses full, equal, and regular over all 4 extremities; no UE or LE edema BL  Skin: St 3 sacral pressure ulcer- no purulence    Significant Labs: All pertinent labs within the past 24 hours have been reviewed.  CBC:   Recent Labs   Lab 02/23/22 0423 02/24/22  0451   WBC 9.15 7.99   HGB 11.1* 10.5*   HCT 35.2* 33.7*    184     CMP:   Recent Labs   Lab 02/23/22 0423 02/24/22  0451    139   K 4.1 3.6    105   CO2 25 25   * 51*   BUN 21 19   CREATININE 1.0 1.1   CALCIUM 8.1* 8.4*   PROT 5.5* 5.7*   ALBUMIN 1.8* 1.8*   BILITOT 0.5 0.5   ALKPHOS 138* 113   AST 21 16   ALT 13 13   ANIONGAP 7* 9   EGFRNONAA >60.0 >60.0       Significant Imaging: I have reviewed all pertinent imaging results/findings within the past 24 hours.    CXR 02/19:  FINDINGS:  Monitoring EKG leads are present.  There are postoperative changes in the base of the neck.     The trachea is unremarkable.  The cardiomediastinal silhouette is enlarged.  There is no evidence of free air beneath the hemidiaphragms there are no pleural effusions there is no evidence of a pneumothorax.  There is no evidence of pneumomediastinum.  There are diffuse interstitial opacities.  The osseous structures demonstrate degenerative changes.     Impression:     Diffuse interstitial opacities.      Assessment/Plan:      * Encephalopathy, metabolic  - Resolved      Diabetic ketoacidosis with coma associated with type 2 diabetes mellitus  - Resolved DKA  - DKA protocol completed and DKA resolved in MICU and Pt stepped down on subq insulin.  -  Hypoglycemia episode upon step-down, detemir adjusted from BID to qhs per home long acting insulin and due to episode of hypoglycemia   - Continue aspart TIDWM  - Diabetic diet  - POCT BGx4  - Cotinue to titrate short and long acting insulin needs as indicated to in-pt hospital goal 140-180     Acute hypoxemic respiratory failure  - 2/2 COVID infection  - maintain SpO2 88-92% given hx of COPD     Paroxysmal atrial fibrillation  History of paroxysmal atrial fibrillation on home  Metoprolol XL 50 mg daily, diltiazem  mg daily and amiodarone 200 mg daily.  - Continue po apixaban  - Continue amiodarone  - metoprolol and diltiazem held in MICU  in the setting of sepsis.   - resume BB and CBB as clinically indicated and tolerated per BP     COVID-19  Viral Pneumonia due to COVID-19  - Interval history and physical exam findings as described above  - COVID test positive on 02/17  - Currently satting 97% on 2L BNC  - Continue remdesivir  - Holding Dex in setting of recent DKA  - Concern for superimposed bacterial PNA- continue doxy and zosyn  - PRN duonebs   - Continuous pulse ox bedside  - Appropriate isolation and airborne precautions in place  - Continuing to monitor  - Wean O2 as tolerated   - Nutrition:           - Multivitamin PO daily          - Continue Boost supplement          - Vitamin D 1000IU daily if deficient          - Ascorbic acid 500mg PO bid    - Supportive Care:          - acetaminophen 650mg PO Q6hr PRN fever/headache          - loperamide PRN viral diarrhea     Severe sepsis  Upon admission:  Organ dysfunction indicated by Acute kidney injury, Encephalopathy  and Acute respiratory failure  Source- Unclear. CXR with diffuse interstitial opacities, however no consolidation noted. UA with pyuria, without elevated leuks. Blood cultures pending. Possibly due to sacral wound, although doesn't look grossly infected.      Started on broad spectrum abx at admission with vanc/zosyn/doxy.    - vancomycin  discontinued today as no MRSA has isolated.  No infectious source found but leukocytosis persists.  Consider de-escalation further based on continued improvement.  Day 3 abx today.     - F/u Urine and blood cultures- NGTD  - Wound care consulted for sacral decubitus wound. Appreciate recs. Will plan to stop antibiotics 02/25     Palliative care status  Per MICU: patient wishes to be DNR/DNI and family agrees. Still want to continue full medical care      Focal seizures  Continue home lancosamide      BRENDAN (acute kidney injury)  - baseline sCr of 1.1-1.3  - sCr of 3.1 at time of admission  - sCr of 1.1  - Monitor I's and O's  - Trend sCr  - Resolved      Embolic stroke involving right middle cerebral artery  Hx of CVA in Jan 2022. CT Head with evolving infarcts in right frontal and left cerebellar hemispheres. No concern for acute stroke per discussion with radiology.      - Continue home antiplatelets, statin and eliquis     Coronary artery disease involving native coronary artery of native heart with unstable angina pectoris  Hx of CAD s/p placement of 2 LAUREN in RCA in 01/09/2022.      - Continue home ASA, plavix and statin     Pulmonary nodules  Has stable bilateral  pulmonary masses which could be malignancy per outpatient pulmonology notes. No pathology report as none of the nodules appeared to be safe for biopsy given high risk of PTX is high with many adjacent bulla.       Chronic pulmonary heart disease  Follows up with Pulm clinic in [place]. Last seen in December and was evaluated for pulmonary masses. Deemed to have mild COPD per notes. Not on any maintenance therapy.     - Continue albuterol inhaler      Type 2 diabetes mellitus with hyperglycemia, with long-term current use of insulin  See DKA     Hypertension associated with diabetes  - BP currently well-controlled  - Continue home regimen of metoprolol 25mg BID  - Will continue to monitor and further titrate antihypertensives as clinically indicated       *Awaiting placement to inpatient rehab.     Discharge Planning   ALLIE: 2/24/2022     Code Status: DNR   Is the patient medically ready for discharge?: Yes    Reason for patient still in hospital (select all that apply): Patient trending condition  Discharge Plan A: Rehab   Discharge Delays: None known at this time                Kelin Catalan MD  Department of Hospital Medicine   [facility] - Intensive Care ([place] [id number])

## 2022-02-25 NOTE — SUBJECTIVE & OBJECTIVE
Interval History: Patient was seen and examined this am. Reports pain of the buttocks at sacral ulcer. No chest pain, nausea, vomiting, sob, fevers, chills, night sweats.     Objective:     Vital Signs (Most Recent):  Temp: 97.3 °F (36.3 °C) (02/25/22 0009)  Pulse: 71 (02/25/22 0104)  Resp: 16 (02/25/22 0104)  BP: 134/66 (02/25/22 0009)  SpO2: 97 % (02/25/22 0104) Vital Signs (24h Range):  Temp:  [97.3 °F (36.3 °C)-98.7 °F (37.1 °C)] 97.3 °F (36.3 °C)  Pulse:  [59-81] 71  Resp:  [16-20] 16  SpO2:  [91 %-99 %] 97 %  BP: (107-143)/(54-74) 134/66     Weight: 76.4 kg (168 lb 6.9 oz)  Body mass index is 21.63 kg/m².    Intake/Output Summary (Last 24 hours) at 2/25/2022 0126  Last data filed at 2/24/2022 1800  Gross per 24 hour   Intake 2799.36 ml   Output 1375 ml   Net 1424.36 ml      Physical Exam  Gen: in NAD, appears stated age  Neuro: AAOx3, motor, sensory, and strength grossly intact BL  HEENT: NTNC, EOMI, PERRL, MMM  CVS: RRR, no m/r/g; S1/S2 auscultated with no S3 or S4; capillary refill < 2 sec  Resp: bibasilar crackles, 2L BNC, no wheezes, no belabored breathing or accessory muscle use appreciated   Abd: BS+ in all 4 quadrants; NTND, soft to palpation; no organomegaly appreciated   Extrem: pulses full, equal, and regular over all 4 extremities; no UE or LE edema BL  Skin: St 3 sacral pressure ulcer- no purulence    Significant Labs: All pertinent labs within the past 24 hours have been reviewed.  CBC:   Recent Labs   Lab 02/23/22  0423 02/24/22  0451   WBC 9.15 7.99   HGB 11.1* 10.5*   HCT 35.2* 33.7*    184     CMP:   Recent Labs   Lab 02/23/22  0423 02/24/22  0451    139   K 4.1 3.6    105   CO2 25 25   * 51*   BUN 21 19   CREATININE 1.0 1.1   CALCIUM 8.1* 8.4*   PROT 5.5* 5.7*   ALBUMIN 1.8* 1.8*   BILITOT 0.5 0.5   ALKPHOS 138* 113   AST 21 16   ALT 13 13   ANIONGAP 7* 9   EGFRNONAA >60.0 >60.0       Significant Imaging: I have reviewed all pertinent imaging results/findings within  the past 24 hours.    CXR 02/19:  FINDINGS:  Monitoring EKG leads are present.  There are postoperative changes in the base of the neck.     The trachea is unremarkable.  The cardiomediastinal silhouette is enlarged.  There is no evidence of free air beneath the hemidiaphragms there are no pleural effusions there is no evidence of a pneumothorax.  There is no evidence of pneumomediastinum.  There are diffuse interstitial opacities.  The osseous structures demonstrate degenerative changes.     Impression:     Diffuse interstitial opacities.

## 2022-02-25 NOTE — CONSULTS
[facility] ([place] [address])  Wound Care    Patient Name:  Kamar Muñoz   MRN:  [id number]  Date: 2022  Diagnosis: Encephalopathy, metabolic    History:     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 2012    STEMI involving right coronary artery 2022    Type II or unspecified type diabetes mellitus with neurological manifestations, uncontrolled(250.62) 3/8/2013    Urinary tract infection        Social History     Socioeconomic History    Marital status:    Tobacco Use    Smoking status: Former Smoker     Packs/day: 1.50     Years: 25.00     Pack years: 37.50     Quit date: 1983     Years since quittin.1    Smokeless tobacco: Never Used   Substance and Sexual Activity    Alcohol use: No    Drug use: No    Sexual activity: Yes     Partners: Female   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     Social Determinants of Health     Financial Resource Strain: Low Risk     Difficulty of Paying Living Expenses: Not hard at all   Food Insecurity: No Food Insecurity    Worried About Running Out of Food in the Last Year: Never true    Ran Out of Food in the Last Year: Never true   Transportation Needs: No Transportation Needs    Lack of Transportation (Medical): No    Lack of Transportation (Non-Medical): No   Housing Stability: Low Risk     Unable to Pay for Housing in the Last Year: No    Number of Places Lived in the Last Year: 1    Unstable Housing in the Last Year: No       Precautions:   covid 19 positive  Airborne, contact, droplet  Allergies as of 2022 - Reviewed 2022   Allergen Reaction Noted    Iodine  2012       WOC Assessment Details/Treatment   Wound care consulted for sacrum/gluteal by RN/MD  The gluteal cleft and left  buttock have unstageable pressure injuries with redness to serina-wound POA.    He is having frequent small stools, refusing waffle, turning off area.     Plan:  Chair cushion  Immerse mattress/evolution bed  Buttocks/gluteal- IAD/friction- POA  triad ointment BID/prn   Patient reviewed with Ashleigh, skin integrity champion. She will follow up with patient.     Nursing to continue care, pressure prevention measures  Wound care will follow- up prn     Recommendations made to primary team per secure chat for above plan . Orders placed.           02/25/2022

## 2022-02-26 LAB
ALBUMIN SERPL BCP-MCNC: 1.8 G/DL (ref 3.5–5.2)
ALP SERPL-CCNC: 109 U/L (ref 55–135)
ALT SERPL W/O P-5'-P-CCNC: 10 U/L (ref 10–44)
ANION GAP SERPL CALC-SCNC: 8 MMOL/L (ref 8–16)
AST SERPL-CCNC: 12 U/L (ref 10–40)
BASOPHILS # BLD AUTO: 0.07 K/UL (ref 0–0.2)
BASOPHILS NFR BLD: 0.6 % (ref 0–1.9)
BILIRUB SERPL-MCNC: 0.5 MG/DL (ref 0.1–1)
BUN SERPL-MCNC: 16 MG/DL (ref 8–23)
CALCIUM SERPL-MCNC: 8.2 MG/DL (ref 8.7–10.5)
CHLORIDE SERPL-SCNC: 106 MMOL/L (ref 95–110)
CO2 SERPL-SCNC: 27 MMOL/L (ref 23–29)
CREAT SERPL-MCNC: 1 MG/DL (ref 0.5–1.4)
DIFFERENTIAL METHOD: ABNORMAL
EOSINOPHIL # BLD AUTO: 0.8 K/UL (ref 0–0.5)
EOSINOPHIL NFR BLD: 7 % (ref 0–8)
ERYTHROCYTE [DISTWIDTH] IN BLOOD BY AUTOMATED COUNT: 14.6 % (ref 11.5–14.5)
EST. GFR  (AFRICAN AMERICAN): >60 ML/MIN/1.73 M^2
EST. GFR  (NON AFRICAN AMERICAN): >60 ML/MIN/1.73 M^2
FERRITIN SERPL-MCNC: 337 NG/ML (ref 20–300)
GLUCOSE SERPL-MCNC: 56 MG/DL (ref 70–110)
HCT VFR BLD AUTO: 36.5 % (ref 40–54)
HGB BLD-MCNC: 11.7 G/DL (ref 14–18)
IMM GRANULOCYTES # BLD AUTO: 0.04 K/UL (ref 0–0.04)
IMM GRANULOCYTES NFR BLD AUTO: 0.4 % (ref 0–0.5)
LYMPHOCYTES # BLD AUTO: 1.7 K/UL (ref 1–4.8)
LYMPHOCYTES NFR BLD: 14.6 % (ref 18–48)
MAGNESIUM SERPL-MCNC: 1.6 MG/DL (ref 1.6–2.6)
MCH RBC QN AUTO: 28.3 PG (ref 27–31)
MCHC RBC AUTO-ENTMCNC: 32.1 G/DL (ref 32–36)
MCV RBC AUTO: 88 FL (ref 82–98)
MONOCYTES # BLD AUTO: 0.9 K/UL (ref 0.3–1)
MONOCYTES NFR BLD: 8.2 % (ref 4–15)
NEUTROPHILS # BLD AUTO: 7.8 K/UL (ref 1.8–7.7)
NEUTROPHILS NFR BLD: 69.2 % (ref 38–73)
NRBC BLD-RTO: 0 /100 WBC
PHOSPHATE SERPL-MCNC: 2.3 MG/DL (ref 2.7–4.5)
PLATELET # BLD AUTO: 211 K/UL (ref 150–450)
PMV BLD AUTO: 9.9 FL (ref 9.2–12.9)
POCT GLUCOSE: 167 MG/DL (ref 70–110)
POCT GLUCOSE: 202 MG/DL (ref 70–110)
POCT GLUCOSE: 206 MG/DL (ref 70–110)
POCT GLUCOSE: 35 MG/DL (ref 70–110)
POCT GLUCOSE: 36 MG/DL (ref 70–110)
POCT GLUCOSE: 43 MG/DL (ref 70–110)
POCT GLUCOSE: 56 MG/DL (ref 70–110)
POCT GLUCOSE: 74 MG/DL (ref 70–110)
POTASSIUM SERPL-SCNC: 3.6 MMOL/L (ref 3.5–5.1)
PROT SERPL-MCNC: 5.8 G/DL (ref 6–8.4)
RBC # BLD AUTO: 4.13 M/UL (ref 4.6–6.2)
SODIUM SERPL-SCNC: 141 MMOL/L (ref 136–145)
WBC # BLD AUTO: 11.3 K/UL (ref 3.9–12.7)

## 2022-02-26 PROCEDURE — 36415 COLL VENOUS BLD VENIPUNCTURE: CPT | Mod: HCNC | Performed by: STUDENT IN AN ORGANIZED HEALTH CARE EDUCATION/TRAINING PROGRAM

## 2022-02-26 PROCEDURE — 83735 ASSAY OF MAGNESIUM: CPT | Mod: HCNC | Performed by: STUDENT IN AN ORGANIZED HEALTH CARE EDUCATION/TRAINING PROGRAM

## 2022-02-26 PROCEDURE — 25000003 PHARM REV CODE 250: Mod: HCNC | Performed by: STUDENT IN AN ORGANIZED HEALTH CARE EDUCATION/TRAINING PROGRAM

## 2022-02-26 PROCEDURE — 85025 COMPLETE CBC W/AUTO DIFF WBC: CPT | Mod: HCNC | Performed by: STUDENT IN AN ORGANIZED HEALTH CARE EDUCATION/TRAINING PROGRAM

## 2022-02-26 PROCEDURE — 80053 COMPREHEN METABOLIC PANEL: CPT | Mod: HCNC | Performed by: STUDENT IN AN ORGANIZED HEALTH CARE EDUCATION/TRAINING PROGRAM

## 2022-02-26 PROCEDURE — 99232 SBSQ HOSP IP/OBS MODERATE 35: CPT | Mod: HCNC,,, | Performed by: STUDENT IN AN ORGANIZED HEALTH CARE EDUCATION/TRAINING PROGRAM

## 2022-02-26 PROCEDURE — 94761 N-INVAS EAR/PLS OXIMETRY MLT: CPT | Mod: HCNC

## 2022-02-26 PROCEDURE — 84100 ASSAY OF PHOSPHORUS: CPT | Mod: HCNC | Performed by: STUDENT IN AN ORGANIZED HEALTH CARE EDUCATION/TRAINING PROGRAM

## 2022-02-26 PROCEDURE — 99900035 HC TECH TIME PER 15 MIN (STAT): Mod: HCNC

## 2022-02-26 PROCEDURE — 27000221 HC OXYGEN, UP TO 24 HOURS: Mod: HCNC

## 2022-02-26 PROCEDURE — 94640 AIRWAY INHALATION TREATMENT: CPT | Mod: HCNC

## 2022-02-26 PROCEDURE — 99232 PR SUBSEQUENT HOSPITAL CARE,LEVL II: ICD-10-PCS | Mod: HCNC,,, | Performed by: STUDENT IN AN ORGANIZED HEALTH CARE EDUCATION/TRAINING PROGRAM

## 2022-02-26 PROCEDURE — 12000002 HC ACUTE/MED SURGE SEMI-PRIVATE ROOM: Mod: HCNC

## 2022-02-26 PROCEDURE — 27000207 HC ISOLATION: Mod: HCNC

## 2022-02-26 RX ORDER — GABAPENTIN 100 MG/1
100 CAPSULE ORAL 3 TIMES DAILY
Status: DISCONTINUED | OUTPATIENT
Start: 2022-02-26 | End: 2022-02-27

## 2022-02-26 RX ADMIN — ALBUTEROL SULFATE 2 PUFF: 90 AEROSOL, METERED RESPIRATORY (INHALATION) at 06:02

## 2022-02-26 RX ADMIN — OXYCODONE HYDROCHLORIDE AND ACETAMINOPHEN 500 MG: 500 TABLET ORAL at 09:02

## 2022-02-26 RX ADMIN — INSULIN ASPART 1 UNITS: 100 INJECTION, SOLUTION INTRAVENOUS; SUBCUTANEOUS at 09:02

## 2022-02-26 RX ADMIN — DICLOFENAC SODIUM 4 G: 10 GEL TOPICAL at 09:02

## 2022-02-26 RX ADMIN — APIXABAN 5 MG: 5 TABLET, FILM COATED ORAL at 09:02

## 2022-02-26 RX ADMIN — OXYCODONE 5 MG: 5 TABLET ORAL at 09:02

## 2022-02-26 RX ADMIN — GABAPENTIN 100 MG: 100 CAPSULE ORAL at 09:02

## 2022-02-26 RX ADMIN — ALBUTEROL SULFATE 2 PUFF: 90 AEROSOL, METERED RESPIRATORY (INHALATION) at 05:02

## 2022-02-26 RX ADMIN — METOPROLOL TARTRATE 25 MG: 25 TABLET, FILM COATED ORAL at 10:02

## 2022-02-26 RX ADMIN — GABAPENTIN 100 MG: 100 CAPSULE ORAL at 04:02

## 2022-02-26 RX ADMIN — OXYCODONE HYDROCHLORIDE AND ACETAMINOPHEN 500 MG: 500 TABLET ORAL at 10:02

## 2022-02-26 RX ADMIN — ATORVASTATIN CALCIUM 40 MG: 20 TABLET, FILM COATED ORAL at 10:02

## 2022-02-26 RX ADMIN — METOPROLOL TARTRATE 25 MG: 25 TABLET, FILM COATED ORAL at 09:02

## 2022-02-26 RX ADMIN — LOPERAMIDE HYDROCHLORIDE 2 MG: 2 CAPSULE ORAL at 09:02

## 2022-02-26 RX ADMIN — ALBUTEROL SULFATE 2 PUFF: 90 AEROSOL, METERED RESPIRATORY (INHALATION) at 10:02

## 2022-02-26 RX ADMIN — ALBUTEROL SULFATE 2 PUFF: 90 AEROSOL, METERED RESPIRATORY (INHALATION) at 02:02

## 2022-02-26 RX ADMIN — INSULIN ASPART 5 UNITS: 100 INJECTION, SOLUTION INTRAVENOUS; SUBCUTANEOUS at 04:02

## 2022-02-26 RX ADMIN — Medication 16 G: at 07:02

## 2022-02-26 RX ADMIN — DICLOFENAC SODIUM 4 G: 10 GEL TOPICAL at 06:02

## 2022-02-26 RX ADMIN — CLOPIDOGREL 75 MG: 75 TABLET, FILM COATED ORAL at 10:02

## 2022-02-26 RX ADMIN — ASPIRIN 81 MG: 81 TABLET, COATED ORAL at 10:02

## 2022-02-26 RX ADMIN — LACOSAMIDE 100 MG: 100 TABLET, FILM COATED ORAL at 10:02

## 2022-02-26 RX ADMIN — PANTOPRAZOLE SODIUM 40 MG: 40 TABLET, DELAYED RELEASE ORAL at 09:02

## 2022-02-26 RX ADMIN — AMIODARONE HYDROCHLORIDE 200 MG: 200 TABLET ORAL at 10:02

## 2022-02-26 RX ADMIN — DICLOFENAC SODIUM 4 G: 10 GEL TOPICAL at 01:02

## 2022-02-26 RX ADMIN — PANTOPRAZOLE SODIUM 40 MG: 40 TABLET, DELAYED RELEASE ORAL at 10:02

## 2022-02-26 RX ADMIN — DICLOFENAC SODIUM 4 G: 10 GEL TOPICAL at 10:02

## 2022-02-26 RX ADMIN — APIXABAN 5 MG: 5 TABLET, FILM COATED ORAL at 10:02

## 2022-02-26 RX ADMIN — ALBUTEROL SULFATE 2 PUFF: 90 AEROSOL, METERED RESPIRATORY (INHALATION) at 01:02

## 2022-02-26 RX ADMIN — LACOSAMIDE 100 MG: 100 TABLET, FILM COATED ORAL at 09:02

## 2022-02-26 RX ADMIN — CALCIUM CARBONATE (ANTACID) CHEW TAB 500 MG 500 MG: 500 CHEW TAB at 12:02

## 2022-02-26 RX ADMIN — MULTIPLE VITAMINS W/ MINERALS TAB 1 TABLET: TAB at 10:02

## 2022-02-26 NOTE — SUBJECTIVE & OBJECTIVE
Interval History: Patient was seen and examined this am. Hypoglycemia this am. Responsive to orange juice and rodrick crackers- repeat w/in 70s. No further diarrhea or abdominal pain. Still w/SOB at bedtime, but no increasing oxygen requirements. No fevers, chills, night sweats, headache, rash, constipation, decrease in UOP.     Objective:     Vital Signs (Most Recent):  Temp: 98 °F (36.7 °C) (02/26/22 1520)  Pulse: 78 (02/26/22 1520)  Resp: 18 (02/26/22 1520)  BP: 138/67 (02/26/22 1520)  SpO2: (!) 93 % (02/26/22 1520) Vital Signs (24h Range):  Temp:  [97.7 °F (36.5 °C)-98.8 °F (37.1 °C)] 98 °F (36.7 °C)  Pulse:  [68-96] 78  Resp:  [16-20] 18  SpO2:  [90 %-96 %] 93 %  BP: (130-157)/(62-80) 138/67     Weight: 76.4 kg (168 lb 6.9 oz)  Body mass index is 21.63 kg/m².    Intake/Output Summary (Last 24 hours) at 2/26/2022 1630  Last data filed at 2/26/2022 1400  Gross per 24 hour   Intake 840 ml   Output 350 ml   Net 490 ml      Physical Exam  Gen: in NAD, appears stated age  Neuro: AAOx3, motor, sensory, and strength grossly intact BL  HEENT: NTNC, EOMI, PERRL, MMM  CVS: RRR, no m/r/g; S1/S2 auscultated with no S3 or S4; capillary refill < 2 sec  Resp: bibasilar crackles, 1L BNC, no wheezes, no belabored breathing or accessory muscle use appreciated   Abd: BS+ in all 4 quadrants; NTND, soft to palpation; no organomegaly appreciated   Extrem: pulses full, equal, and regular over all 4 extremities; no UE or LE edema BL  Skin: St 3 sacral pressure ulcer- no purulence    Significant Labs: All pertinent labs within the past 24 hours have been reviewed.  CBC:   Recent Labs   Lab 02/25/22  0526 02/26/22  0414   WBC 9.90 11.30   HGB 11.7* 11.7*   HCT 38.1* 36.5*    211     CMP:   Recent Labs   Lab 02/25/22  0526 02/26/22  0414    141   K 4.4 3.6    106   CO2 22* 27   * 56*   BUN 18 16   CREATININE 1.0 1.0   CALCIUM 8.0* 8.2*   PROT 5.8* 5.8*   ALBUMIN 1.8* 1.8*   BILITOT 0.6 0.5   ALKPHOS 113 109   AST  15 12   ALT 11 10   ANIONGAP 11 8   EGFRNONAA >60.0 >60.0       Significant Imaging: I have reviewed all pertinent imaging results/findings within the past 24 hours.    CXR 02/26:  FINDINGS:  AP portable chest radiographic examination is submitted.  When accounting for difference in position and technique the appearance of the cardiomediastinal silhouette is thought stable.     There appears to be mild prominence of the central pulmonary vascular, there is bilateral pattern of pulmonary opacity consistent with interstitial and ground-glass and patchy alveolar infiltrates, this appears superimposed on chronic change.  There is no evidence for large pleural effusion there is minimal blunting at the right costophrenic angle that may relate to a small amount of pleural fluid.  There is no evidence for pneumothorax.  The osseous structures demonstrate chronic change.     Impression:     Mild prominence of the central pulmonary vascular, there is appearance of bilateral opacity consistent with interstitial and ground-glass and patchy alveolar infiltrates.

## 2022-02-26 NOTE — PROGRESS NOTES
[facility] ([address] [facility]  Progress Note    Patient Name: Kamar Muñoz  MRN: [id number]  Patient Class: IP- Inpatient   Admission Date: 2/19/2022  Length of Stay: 6 days  Attending Physician: Kelin Catalan MD  Primary Care Provider: Basim Guerrero MD        Subjective:     Principal Problem:Encephalopathy, metabolic        HPI:  Kamar Muñoz is a 76 year old male with past medical history of CAD s/p 2 LAUREN in RCA (Jan 2022), HTN, HLD, p. A. 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 History was not obtained from patient due to encephalopathy. Patient's daughter at bedside reports the patient was recently in the ED on 2/17 for a mechanical fall after being discharged from rehab the same day. CTH and cervical spine revealed  evolving late subacute infarct involving the right frontal lobe with questionable petechial hemorrhage. Vascular neurology evaluated the patient and cleared him for discharge from without any new interventions. He was also tested positive for COVID-19 and was discharged yesterday from the ED. He subsequently received one dose of sotrovimab infusion for COVID and was in a normal state of health until this morning when his daughter found him lethargic and barely responsive prompting her to bring him to the ED. She reports the patient had no complaints prior to developing his AMS. Of note, the patient was recently admitted to [facility] [place] [facility] from 01/25 through 02/13 for AMS concerning for CVA, however he was treated for  "metabolic encephalopathy 2/2 to DKA/HHS, sepsis and BRENDAN.      Upon arrival to the ED, he was placed on 6L NC, normotensive and tachycardic. Lactic 11.5, WBC 17.8, Glucose 1109, pH 7.17, Co2 15.6 HCO3 <5, AG unable to calculate. sCr 3.1. CXR with intersitial opacities. He received ~4L of IVF, vancomycin, zosyn, initiated on insulin shifted for hyperkalemia and calcium for cardioprotection. ECG without noticeable ischemic changes. CTH without new changes- discussed findings with radiology. Patient was admitted to the MICU for further management.        Overview/Hospital Course:  MICU Course:  Placed on remdesivir and broad spectrum IV abx in addition to insulin per DKA protocol. In MICU: Weaned supp O2 to room air; Transitioned to subq insulin; Improvement of AMS. BRENDAN improving gradually. Pt had discussion w/ family in MICU and transitioned from full code to DNR/DNI.  Step down to HM initiated 2/21.  Pt w/ episode of CP and hypoglycemia upon stepdown. Ischemic work-up largely unremarkable with trop down trending from admission. Detemir dosing adjusted from BID to qhs.     Pt was found to have St 3 sacral pressure ulcer.       Interval History: Patient was seen and examined this am. Reports abdominal pain, diarrhea- 2 loose stools in the past 24hrs. Abdominal pain occurs after meals. Poor PO intake- "doesn't like food." Some cough and sob- worse at bedtime- no increased oxygen requirements. No fevers, chills, night sweats, headache, rash, constipation, decrease in UOP.     Objective:     Vital Signs (Most Recent):  Temp: 98.6 °F (37 °C) (02/25/22 1618)  Pulse: 77 (02/25/22 1618)  Resp: 18 (02/25/22 1841)  BP: (!) 129/58 (02/25/22 1618)  SpO2: (!) 94 % (02/25/22 1618) Vital Signs (24h Range):  Temp:  [97 °F (36.1 °C)-98.6 °F (37 °C)] 98.6 °F (37 °C)  Pulse:  [59-86] 77  Resp:  [16-19] 18  SpO2:  [92 %-98 %] 94 %  BP: (116-157)/(56-78) 129/58     Weight: 76.4 kg (168 lb 6.9 oz)  Body mass index is 21.63 " kg/m².    Intake/Output Summary (Last 24 hours) at 2/25/2022 1854  Last data filed at 2/25/2022 1841  Gross per 24 hour   Intake --   Output 1000 ml   Net -1000 ml      Physical Exam  Gen: in NAD, appears stated age  Neuro: AAOx3, motor, sensory, and strength grossly intact BL  HEENT: NTNC, EOMI, PERRL, MMM  CVS: RRR, no m/r/g; S1/S2 auscultated with no S3 or S4; capillary refill < 2 sec  Resp: bibasilar crackles, 2L BNC, no wheezes, no belabored breathing or accessory muscle use appreciated   Abd: BS+ in all 4 quadrants; NTND, soft to palpation; no organomegaly appreciated   Extrem: pulses full, equal, and regular over all 4 extremities; no UE or LE edema BL  Skin: St 3 sacral pressure ulcer- no purulence    Significant Labs: All pertinent labs within the past 24 hours have been reviewed.  CBC:   Recent Labs   Lab 02/24/22  0451 02/25/22  0526   WBC 7.99 9.90   HGB 10.5* 11.7*   HCT 33.7* 38.1*    166     CMP:   Recent Labs   Lab 02/24/22  0451 02/25/22  0526    136   K 3.6 4.4    103   CO2 25 22*   GLU 51* 230*   BUN 19 18   CREATININE 1.1 1.0   CALCIUM 8.4* 8.0*   PROT 5.7* 5.8*   ALBUMIN 1.8* 1.8*   BILITOT 0.5 0.6   ALKPHOS 113 113   AST 16 15   ALT 13 11   ANIONGAP 9 11   EGFRNONAA >60.0 >60.0       Significant Imaging: I have reviewed all pertinent imaging results/findings within the past 24 hours.    CXR 02/19:  FINDINGS:  Monitoring EKG leads are present.  There are postoperative changes in the base of the neck.     The trachea is unremarkable.  The cardiomediastinal silhouette is enlarged.  There is no evidence of free air beneath the hemidiaphragms there are no pleural effusions there is no evidence of a pneumothorax.  There is no evidence of pneumomediastinum.  There are diffuse interstitial opacities.  The osseous structures demonstrate degenerative changes.     Impression:     Diffuse interstitial opacities.      Assessment/Plan:      Abdominal Pain, Diarrhea  - Likely 2/2  reflux/gastritis, but also component of antibiotic side effects  - stopping antibiotics today  - no suspicion for c diff  - loperamide prn  - prn tums for indigestion  - increase pantoprazole to 40mg BID  - Monitor for improvement    Encephalopathy, metabolic  - Resolved      Diabetic ketoacidosis with coma associated with type 2 diabetes mellitus  - Resolved DKA  - DKA protocol completed and DKA resolved in MICU and Pt stepped down on subq insulin.  - Hypoglycemia episode upon step-down, detemir adjusted from BID to qhs per home long acting insulin and due to episode of hypoglycemia - hyperglycemia- increased levemir to 15u qhs  - Continue aspart TIDWM  - Diabetic diet  - POCT BGx4  - Cotinue to titrate short and long acting insulin needs as indicated to in-pt hospital goal 140-180     Acute hypoxemic respiratory failure  - 2/2 COVID infection  - maintain SpO2 88-92% given hx of COPD     Paroxysmal atrial fibrillation  History of paroxysmal atrial fibrillation on home  Metoprolol XL 50 mg daily, diltiazem  mg daily and amiodarone 200 mg daily.  - Continue po apixaban  - Continue amiodarone  - metoprolol and diltiazem held in MICU  in the setting of sepsis.   - resume BB and CBB as clinically indicated and tolerated per BP     COVID-19  Viral Pneumonia due to COVID-19  - Interval history and physical exam findings as described above  - COVID test positive on 02/17  - Currently satting 97% on 2L BNC  - S/p 5d of remdesivir  - Holding Dex in setting of recent DKA  - Stopped doxy and zosyn   - PRN duonebs   - Continuous pulse ox bedside  - Appropriate isolation and airborne precautions in place  - Continuing to monitor  - Wean O2 as tolerated   - Increase cough and sob at bedtime- repeat CXR   - Nutrition:           - Multivitamin PO daily          - Continue Boost supplement          - Vitamin D 1000IU daily if deficient          - Ascorbic acid 500mg PO bid    - Supportive Care:          - acetaminophen 650mg  PO Q6hr PRN fever/headache          - loperamide PRN viral diarrhea     Severe sepsis  Upon admission:  Organ dysfunction indicated by Acute kidney injury, Encephalopathy  and Acute respiratory failure  Source- Unclear. CXR with diffuse interstitial opacities, however no consolidation noted. UA with pyuria, without elevated leuks. Blood cultures pending. Possibly due to sacral wound, although doesn't look grossly infected.      Started on broad spectrum abx at admission with vanc/zosyn/doxy.    - vancomycin discontinued today as no MRSA has isolated.  No infectious source found but leukocytosis persists.  Consider de-escalation further based on continued improvement.  Day 3 abx today.     - F/u Urine and blood cultures- NGTD  - Wound care consulted for sacral decubitus wound. Appreciate recs. Stopped antibiotics 02/25     Palliative care status  Per MICU: patient wishes to be DNR/DNI and family agrees. Still want to continue full medical care      Focal seizures  Continue home lancosamide      BRENDAN (acute kidney injury)  - baseline sCr of 1.1-1.3  - sCr of 3.1 at time of admission  - sCr of 1.0  - Monitor I's and O's  - Trend sCr  - Resolved      Embolic stroke involving right middle cerebral artery  Hx of CVA in Jan 2022. CT Head with evolving infarcts in right frontal and left cerebellar hemispheres. No concern for acute stroke per discussion with radiology.      - Continue home antiplatelets, statin and eliquis     Coronary artery disease involving native coronary artery of native heart with unstable angina pectoris  Hx of CAD s/p placement of 2 LAUREN in RCA in 01/09/2022.      - Continue home ASA, plavix and statin     Pulmonary nodules  Has stable bilateral  pulmonary masses which could be malignancy per outpatient pulmonology notes. No pathology report as none of the nodules appeared to be safe for biopsy given high risk of PTX is high with many adjacent bulla.       Chronic pulmonary heart disease  Follows up  with Pulm clinic in [place]. Last seen in December and was evaluated for pulmonary masses. Deemed to have mild COPD per notes. Not on any maintenance therapy.     - Continue albuterol inhaler      Type 2 diabetes mellitus with hyperglycemia, with long-term current use of insulin  See DKA     Hypertension associated with diabetes  - BP currently well-controlled  - Continue home regimen of metoprolol 25mg BID  - Will continue to monitor and further titrate antihypertensives as clinically indicated       *Awaiting placement to inpatient rehab.     Discharge Planning   ALLIE: 2/28/2022     Code Status: DNR   Is the patient medically ready for discharge?: Yes    Reason for patient still in hospital (select all that apply): Patient trending condition  Discharge Plan A: Rehab   Discharge Delays: None known at this time                Kelin Catalan MD  Department of Hospital Medicine   [facility] - [facility] ([facility]

## 2022-02-26 NOTE — PLAN OF CARE
Problem: Adult Inpatient Plan of Care  Goal: Plan of Care Review  Outcome: Ongoing, Progressing  Goal: Patient-Specific Goal (Individualized)  Outcome: Ongoing, Progressing  Goal: Absence of Hospital-Acquired Illness or Injury  Outcome: Ongoing, Progressing  Goal: Optimal Comfort and Wellbeing  Outcome: Ongoing, Progressing  Goal: Readiness for Transition of Care  Outcome: Ongoing, Progressing     Problem: Diabetes Comorbidity  Goal: Blood Glucose Level Within Targeted Range  Outcome: Ongoing, Progressing     Problem: Skin Injury Risk Increased  Goal: Skin Health and Integrity  Outcome: Ongoing, Progressing     AAOx. Strict blood sugar monitoring. Hypoglycemia  noted in AM but asymptomatic, 56-> 74 with oral glucose tabs. Novolog held AM and lunch (ate less than 25% for lunch). Gapapentin initiated. Turned and repositioned Q2H as needed. Pt not c/o pain or discomfort noted. No BM today. Condom catheter in place. WC completed. Instructed to call for assistance

## 2022-02-26 NOTE — SUBJECTIVE & OBJECTIVE
"Interval History: Patient was seen and examined this am. Reports abdominal pain, diarrhea- 2 loose stools in the past 24hrs. Abdominal pain occurs after meals. Poor PO intake- "doesn't like food." Some cough and sob- worse at bedtime- no increased oxygen requirements. No fevers, chills, night sweats, headache, rash, constipation, decrease in UOP.     Objective:     Vital Signs (Most Recent):  Temp: 98.6 °F (37 °C) (02/25/22 1618)  Pulse: 77 (02/25/22 1618)  Resp: 18 (02/25/22 1841)  BP: (!) 129/58 (02/25/22 1618)  SpO2: (!) 94 % (02/25/22 1618) Vital Signs (24h Range):  Temp:  [97 °F (36.1 °C)-98.6 °F (37 °C)] 98.6 °F (37 °C)  Pulse:  [59-86] 77  Resp:  [16-19] 18  SpO2:  [92 %-98 %] 94 %  BP: (116-157)/(56-78) 129/58     Weight: 76.4 kg (168 lb 6.9 oz)  Body mass index is 21.63 kg/m².    Intake/Output Summary (Last 24 hours) at 2/25/2022 1854  Last data filed at 2/25/2022 1841  Gross per 24 hour   Intake --   Output 1000 ml   Net -1000 ml      Physical Exam  Gen: in NAD, appears stated age  Neuro: AAOx3, motor, sensory, and strength grossly intact BL  HEENT: NTNC, EOMI, PERRL, MMM  CVS: RRR, no m/r/g; S1/S2 auscultated with no S3 or S4; capillary refill < 2 sec  Resp: bibasilar crackles, 2L BNC, no wheezes, no belabored breathing or accessory muscle use appreciated   Abd: BS+ in all 4 quadrants; NTND, soft to palpation; no organomegaly appreciated   Extrem: pulses full, equal, and regular over all 4 extremities; no UE or LE edema BL  Skin: St 3 sacral pressure ulcer- no purulence    Significant Labs: All pertinent labs within the past 24 hours have been reviewed.  CBC:   Recent Labs   Lab 02/24/22  0451 02/25/22  0526   WBC 7.99 9.90   HGB 10.5* 11.7*   HCT 33.7* 38.1*    166     CMP:   Recent Labs   Lab 02/24/22  0451 02/25/22  0526    136   K 3.6 4.4    103   CO2 25 22*   GLU 51* 230*   BUN 19 18   CREATININE 1.1 1.0   CALCIUM 8.4* 8.0*   PROT 5.7* 5.8*   ALBUMIN 1.8* 1.8*   BILITOT 0.5 0.6 "   ALKPHOS 113 113   AST 16 15   ALT 13 11   ANIONGAP 9 11   EGFRNONAA >60.0 >60.0       Significant Imaging: I have reviewed all pertinent imaging results/findings within the past 24 hours.    CXR 02/19:  FINDINGS:  Monitoring EKG leads are present.  There are postoperative changes in the base of the neck.     The trachea is unremarkable.  The cardiomediastinal silhouette is enlarged.  There is no evidence of free air beneath the hemidiaphragms there are no pleural effusions there is no evidence of a pneumothorax.  There is no evidence of pneumomediastinum.  There are diffuse interstitial opacities.  The osseous structures demonstrate degenerative changes.     Impression:     Diffuse interstitial opacities.

## 2022-02-26 NOTE — PROGRESS NOTES
[facility] - [facility] ([address] Medicine  Progress Note    Patient Name: Kamar Muñoz  MRN: [id number]  Patient Class: IP- Inpatient   Admission Date: 2/19/2022  Length of Stay: 7 days  Attending Physician: Kelin Catalan MD  Primary Care Provider: Basim Guerrero MD        Subjective:     Principal Problem:Encephalopathy, metabolic        HPI:  Kamar Muñoz is a 76 year old male with past medical history of CAD s/p 2 LAUREN in RCA (Jan 2022), HTN, HLD, p. A. 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 History was not obtained from patient due to encephalopathy. Patient's daughter at bedside reports the patient was recently in the ED on 2/17 for a mechanical fall after being discharged from rehab the same day. CTH and cervical spine revealed  evolving late subacute infarct involving the right frontal lobe with questionable petechial hemorrhage. Vascular neurology evaluated the patient and cleared him for discharge from without any new interventions. He was also tested positive for COVID-19 and was discharged yesterday from the ED. He subsequently received one dose of sotrovimab infusion for COVID and was in a normal state of health until this morning when his daughter found him lethargic and barely responsive prompting her to bring him to the ED. She reports the patient had no complaints prior to developing his AMS. Of note, the patient was recently admitted to [facility] from 01/25 through 02/13 for AMS concerning for CVA, however he was treated for  metabolic encephalopathy 2/2 to DKA/HHS, sepsis and BRENDAN.      Upon arrival to the ED, he was placed on 6L NC, normotensive and tachycardic. Lactic 11.5, WBC 17.8, Glucose 1109, pH 7.17, Co2 15.6 HCO3 <5, AG unable to calculate. sCr 3.1. CXR with intersitial opacities. He received ~4L of IVF, vancomycin, zosyn, initiated on insulin shifted for hyperkalemia and calcium for cardioprotection. ECG without noticeable ischemic changes. CTH without new changes- discussed findings with radiology. Patient was admitted to the MICU for further management.        Overview/Hospital Course:  ICU Course:  Placed on remdesivir and broad spectrum IV abx in addition to insulin per DKA protocol. In MICU: Weaned supp O2 to room air; Transitioned to subq insulin; Improvement of AMS. BRENDAN improving gradually. Pt had discussion w/ family in MICU and transitioned from full code to DNR/DNI.  Step down to HM initiated 2/21.    Hospital Medicine Course:  Pt w/ episode of CP and hypoglycemia upon stepdown. Ischemic work-up largely unremarkable with trop down trending from admission. Detemir dosing adjusted from BID to qhs. He had additional hypoglycemic episode upon re-uptitration of long-acting insulin from 12 to 15. Dosing decreased to 13u as patient was hyperglycemic to 230s w/12u qhs.     Episode of abdominal pain and diarrhea 02/25- appeared to be 2/2 antibiotic use. Antibiotics stopped 02/25.     Pt was found to have St 3 sacral pressure ulcer.       Interval History: Patient was seen and examined this am. Hypoglycemia this am. Responsive to orange juice and rodrick crackers- repeat w/in 70s. No further diarrhea or abdominal pain. Still w/SOB at bedtime, but no increasing oxygen requirements. No fevers, chills, night sweats, headache, rash, constipation, decrease in UOP.     Objective:     Vital Signs (Most Recent):  Temp: 98 °F (36.7 °C) (02/26/22 1520)  Pulse: 78 (02/26/22 1520)  Resp: 18 (02/26/22 1520)  BP: 138/67 (02/26/22 1520)  SpO2:  (!) 93 % (02/26/22 1520) Vital Signs (24h Range):  Temp:  [97.7 °F (36.5 °C)-98.8 °F (37.1 °C)] 98 °F (36.7 °C)  Pulse:  [68-96] 78  Resp:  [16-20] 18  SpO2:  [90 %-96 %] 93 %  BP: (130-157)/(62-80) 138/67     Weight: 76.4 kg (168 lb 6.9 oz)  Body mass index is 21.63 kg/m².    Intake/Output Summary (Last 24 hours) at 2/26/2022 1630  Last data filed at 2/26/2022 1400  Gross per 24 hour   Intake 840 ml   Output 350 ml   Net 490 ml      Physical Exam  Gen: in NAD, appears stated age  Neuro: AAOx3, motor, sensory, and strength grossly intact BL  HEENT: NTNC, EOMI, PERRL, MMM  CVS: RRR, no m/r/g; S1/S2 auscultated with no S3 or S4; capillary refill < 2 sec  Resp: bibasilar crackles, 1L BNC, no wheezes, no belabored breathing or accessory muscle use appreciated   Abd: BS+ in all 4 quadrants; NTND, soft to palpation; no organomegaly appreciated   Extrem: pulses full, equal, and regular over all 4 extremities; no UE or LE edema BL  Skin:  Sacral/glutteal pressure ulcer- no purulence    Significant Labs: All pertinent labs within the past 24 hours have been reviewed.  CBC:   Recent Labs   Lab 02/25/22  0526 02/26/22  0414   WBC 9.90 11.30   HGB 11.7* 11.7*   HCT 38.1* 36.5*    211     CMP:   Recent Labs   Lab 02/25/22  0526 02/26/22  0414    141   K 4.4 3.6    106   CO2 22* 27   * 56*   BUN 18 16   CREATININE 1.0 1.0   CALCIUM 8.0* 8.2*   PROT 5.8* 5.8*   ALBUMIN 1.8* 1.8*   BILITOT 0.6 0.5   ALKPHOS 113 109   AST 15 12   ALT 11 10   ANIONGAP 11 8   EGFRNONAA >60.0 >60.0       Significant Imaging: I have reviewed all pertinent imaging results/findings within the past 24 hours.    CXR 02/26:  FINDINGS:  AP portable chest radiographic examination is submitted.  When accounting for difference in position and technique the appearance of the cardiomediastinal silhouette is thought stable.     There appears to be mild prominence of the central pulmonary vascular, there is bilateral pattern of pulmonary  opacity consistent with interstitial and ground-glass and patchy alveolar infiltrates, this appears superimposed on chronic change.  There is no evidence for large pleural effusion there is minimal blunting at the right costophrenic angle that may relate to a small amount of pleural fluid.  There is no evidence for pneumothorax.  The osseous structures demonstrate chronic change.     Impression:     Mild prominence of the central pulmonary vascular, there is appearance of bilateral opacity consistent with interstitial and ground-glass and patchy alveolar infiltrates.      Assessment/Plan:     Abdominal Pain, Diarrhea  - Resolved  - Likely 2/2 reflux/gastritis, but also component of antibiotic side effects  - no suspicion for c diff  - loperamide prn  - prn tums for indigestion  - continue pantoprazole to 40mg BID     Hypoglycemia  - BG w/in 50s this am- improved to 70s following juice and rodrick crackers  - likely 2/2 excessive insulin in setting of poor PO intake  - Decreasing levemir to 13u qhs     DM2  - Working to optimize BG control  - Levemir 15u qhs- decrease to 13u qhs, continue 5u ACHS  - SSI provided for corrective dosing  - DXTs as ordered  - Hypoglycemic protocol in effect  - Diabetic diet provided    Unstageable sacral/glutteal ulcers  - Wound care following, appreciate recs  - chair cushion, immerse mattress/evolution bed, POA triad ointment BID/PRN     COVID-19  Viral Pneumonia due to COVID-19  - Interval history and physical exam findings as described above  - COVID test positive on 02/17  - Currently satting 97% on 1L BNC  - S/p 5d of remdesivir  - Holding Dex in setting of recent DKA  - Stopped doxy and zosyn 02/25  - PRN duonebs   - Continuous pulse ox bedside  - Appropriate isolation and airborne precautions in place  - Continuing to monitor  - Wean O2 as tolerated   - CXR w/persistent mild prominence of central pulm vascular- BL opacity consistent w/intersitital and GG and patchy aleveolar  infiltrates- otherwise unchanged from previous imaging   - Incentive spirometer ordered   - Nutrition:           - Multivitamin PO daily          - Continue Boost supplement          - Vitamin D 1000IU daily if deficient          - Ascorbic acid 500mg PO bid    - Supportive Care:          - acetaminophen 650mg PO Q6hr PRN fever/headache          - loperamide PRN viral diarrhea    Acute hypoxemic respiratory failure  - 2/2 COVID infection  - maintain SpO2 88-92% given hx of COPD     Paroxysmal atrial fibrillation  History of paroxysmal atrial fibrillation on home  Metoprolol XL 50 mg daily, diltiazem  mg daily and amiodarone 200 mg daily.  - Continue po apixaban  - Continue amiodarone  - metoprolol and diltiazem held in MICU  in the setting of sepsis.   - resume BB and CBB as clinically indicated and tolerated per BP     Palliative care status  - DNR/DNI     Focal seizures  Continue home lancosamide      Embolic stroke involving right middle cerebral artery  Hx of CVA in Jan 2022. CT Head with evolving infarcts in right frontal and left cerebellar hemispheres. No concern for acute stroke per discussion with radiology.      - Continue home antiplatelets, statin and eliquis     Coronary artery disease involving native coronary artery of native heart with unstable angina pectoris  Hx of CAD s/p placement of 2 LAUREN in RCA in 01/09/2022.      - Continue home ASA, plavix and statin     Pulmonary nodules  Has stable bilateral  pulmonary masses which could be malignancy per outpatient pulmonology notes. No pathology report as none of the nodules appeared to be safe for biopsy given high risk of PTX is high with many adjacent bulla.       Chronic pulmonary heart disease  Follows up with Pulm clinic in [place]. Last seen in December and was evaluated for pulmonary masses. Deemed to have mild COPD per notes. Not on any maintenance therapy.     - Continue albuterol inhaler      Hypertension associated with diabetes  - BP  currently well-controlled  - Continue home regimen of metoprolol 25mg BID  - Will continue to monitor and further titrate antihypertensives as clinically indicated     Resolved  - DKA  - Encephalopathy  - BRENDAN  - Severe Sepsis     *Awaiting placement to inpatient rehab.     Discharge Planning   ALLIE: 2/28/2022     Code Status: DNR   Is the patient medically ready for discharge?: Yes    Reason for patient still in hospital (select all that apply): Patient trending condition and Pending disposition  Discharge Plan A: Rehab   Discharge Delays: None known at this time                Kelin Catalan MD  Department of Hospital Medicine   [facility] - Intensive Care ([place])

## 2022-02-27 LAB
ALBUMIN SERPL BCP-MCNC: 1.8 G/DL (ref 3.5–5.2)
ALP SERPL-CCNC: 113 U/L (ref 55–135)
ALT SERPL W/O P-5'-P-CCNC: 11 U/L (ref 10–44)
ANION GAP SERPL CALC-SCNC: 12 MMOL/L (ref 8–16)
AST SERPL-CCNC: 18 U/L (ref 10–40)
BASOPHILS # BLD AUTO: 0.07 K/UL (ref 0–0.2)
BASOPHILS NFR BLD: 0.8 % (ref 0–1.9)
BILIRUB SERPL-MCNC: 0.6 MG/DL (ref 0.1–1)
BUN SERPL-MCNC: 17 MG/DL (ref 8–23)
CALCIUM SERPL-MCNC: 8 MG/DL (ref 8.7–10.5)
CHLORIDE SERPL-SCNC: 103 MMOL/L (ref 95–110)
CO2 SERPL-SCNC: 22 MMOL/L (ref 23–29)
CREAT SERPL-MCNC: 0.9 MG/DL (ref 0.5–1.4)
D DIMER PPP IA.FEU-MCNC: 0.37 MG/L FEU
DIFFERENTIAL METHOD: ABNORMAL
EOSINOPHIL # BLD AUTO: 0.6 K/UL (ref 0–0.5)
EOSINOPHIL NFR BLD: 6.5 % (ref 0–8)
ERYTHROCYTE [DISTWIDTH] IN BLOOD BY AUTOMATED COUNT: 14.6 % (ref 11.5–14.5)
EST. GFR  (AFRICAN AMERICAN): >60 ML/MIN/1.73 M^2
EST. GFR  (NON AFRICAN AMERICAN): >60 ML/MIN/1.73 M^2
FERRITIN SERPL-MCNC: 312 NG/ML (ref 20–300)
GLUCOSE SERPL-MCNC: 277 MG/DL (ref 70–110)
HCT VFR BLD AUTO: 35.8 % (ref 40–54)
HGB BLD-MCNC: 11.5 G/DL (ref 14–18)
IMM GRANULOCYTES # BLD AUTO: 0.04 K/UL (ref 0–0.04)
IMM GRANULOCYTES NFR BLD AUTO: 0.5 % (ref 0–0.5)
LDH SERPL L TO P-CCNC: 217 U/L (ref 110–260)
LYMPHOCYTES # BLD AUTO: 1.3 K/UL (ref 1–4.8)
LYMPHOCYTES NFR BLD: 15.4 % (ref 18–48)
MAGNESIUM SERPL-MCNC: 1.6 MG/DL (ref 1.6–2.6)
MCH RBC QN AUTO: 28.3 PG (ref 27–31)
MCHC RBC AUTO-ENTMCNC: 32.1 G/DL (ref 32–36)
MCV RBC AUTO: 88 FL (ref 82–98)
MONOCYTES # BLD AUTO: 0.9 K/UL (ref 0.3–1)
MONOCYTES NFR BLD: 10 % (ref 4–15)
NEUTROPHILS # BLD AUTO: 5.8 K/UL (ref 1.8–7.7)
NEUTROPHILS NFR BLD: 66.8 % (ref 38–73)
NRBC BLD-RTO: 0 /100 WBC
PHOSPHATE SERPL-MCNC: 3 MG/DL (ref 2.7–4.5)
PLATELET # BLD AUTO: 235 K/UL (ref 150–450)
PMV BLD AUTO: 9.9 FL (ref 9.2–12.9)
POCT GLUCOSE: 181 MG/DL (ref 70–110)
POCT GLUCOSE: 189 MG/DL (ref 70–110)
POCT GLUCOSE: 277 MG/DL (ref 70–110)
POCT GLUCOSE: 298 MG/DL (ref 70–110)
POCT GLUCOSE: 321 MG/DL (ref 70–110)
POTASSIUM SERPL-SCNC: 4.3 MMOL/L (ref 3.5–5.1)
PROT SERPL-MCNC: 5.8 G/DL (ref 6–8.4)
RBC # BLD AUTO: 4.06 M/UL (ref 4.6–6.2)
SODIUM SERPL-SCNC: 137 MMOL/L (ref 136–145)
WBC # BLD AUTO: 8.72 K/UL (ref 3.9–12.7)

## 2022-02-27 PROCEDURE — 85025 COMPLETE CBC W/AUTO DIFF WBC: CPT | Mod: HCNC | Performed by: STUDENT IN AN ORGANIZED HEALTH CARE EDUCATION/TRAINING PROGRAM

## 2022-02-27 PROCEDURE — 27000207 HC ISOLATION: Mod: HCNC

## 2022-02-27 PROCEDURE — 99232 SBSQ HOSP IP/OBS MODERATE 35: CPT | Mod: HCNC,,, | Performed by: STUDENT IN AN ORGANIZED HEALTH CARE EDUCATION/TRAINING PROGRAM

## 2022-02-27 PROCEDURE — 12000002 HC ACUTE/MED SURGE SEMI-PRIVATE ROOM: Mod: HCNC

## 2022-02-27 PROCEDURE — 85379 FIBRIN DEGRADATION QUANT: CPT | Mod: HCNC | Performed by: STUDENT IN AN ORGANIZED HEALTH CARE EDUCATION/TRAINING PROGRAM

## 2022-02-27 PROCEDURE — 84100 ASSAY OF PHOSPHORUS: CPT | Mod: HCNC | Performed by: STUDENT IN AN ORGANIZED HEALTH CARE EDUCATION/TRAINING PROGRAM

## 2022-02-27 PROCEDURE — 27000221 HC OXYGEN, UP TO 24 HOURS: Mod: HCNC

## 2022-02-27 PROCEDURE — 94760 N-INVAS EAR/PLS OXIMETRY 1: CPT | Mod: HCNC

## 2022-02-27 PROCEDURE — 25000003 PHARM REV CODE 250: Mod: HCNC | Performed by: STUDENT IN AN ORGANIZED HEALTH CARE EDUCATION/TRAINING PROGRAM

## 2022-02-27 PROCEDURE — 83615 LACTATE (LD) (LDH) ENZYME: CPT | Mod: HCNC | Performed by: STUDENT IN AN ORGANIZED HEALTH CARE EDUCATION/TRAINING PROGRAM

## 2022-02-27 PROCEDURE — 83735 ASSAY OF MAGNESIUM: CPT | Mod: HCNC | Performed by: STUDENT IN AN ORGANIZED HEALTH CARE EDUCATION/TRAINING PROGRAM

## 2022-02-27 PROCEDURE — 36415 COLL VENOUS BLD VENIPUNCTURE: CPT | Mod: HCNC | Performed by: STUDENT IN AN ORGANIZED HEALTH CARE EDUCATION/TRAINING PROGRAM

## 2022-02-27 PROCEDURE — 99232 PR SUBSEQUENT HOSPITAL CARE,LEVL II: ICD-10-PCS | Mod: HCNC,,, | Performed by: STUDENT IN AN ORGANIZED HEALTH CARE EDUCATION/TRAINING PROGRAM

## 2022-02-27 PROCEDURE — 94640 AIRWAY INHALATION TREATMENT: CPT | Mod: HCNC

## 2022-02-27 PROCEDURE — 82728 ASSAY OF FERRITIN: CPT | Mod: HCNC | Performed by: STUDENT IN AN ORGANIZED HEALTH CARE EDUCATION/TRAINING PROGRAM

## 2022-02-27 PROCEDURE — 99900035 HC TECH TIME PER 15 MIN (STAT): Mod: HCNC

## 2022-02-27 PROCEDURE — 80053 COMPREHEN METABOLIC PANEL: CPT | Mod: HCNC | Performed by: STUDENT IN AN ORGANIZED HEALTH CARE EDUCATION/TRAINING PROGRAM

## 2022-02-27 PROCEDURE — 94761 N-INVAS EAR/PLS OXIMETRY MLT: CPT | Mod: HCNC

## 2022-02-27 RX ORDER — GABAPENTIN 100 MG/1
200 CAPSULE ORAL 3 TIMES DAILY
Status: DISCONTINUED | OUTPATIENT
Start: 2022-02-27 | End: 2022-03-10 | Stop reason: HOSPADM

## 2022-02-27 RX ORDER — INSULIN ASPART 100 [IU]/ML
3 INJECTION, SOLUTION INTRAVENOUS; SUBCUTANEOUS
Status: DISCONTINUED | OUTPATIENT
Start: 2022-02-28 | End: 2022-03-01

## 2022-02-27 RX ORDER — LIDOCAINE 50 MG/G
1 PATCH TOPICAL
Status: DISCONTINUED | OUTPATIENT
Start: 2022-02-27 | End: 2022-03-10 | Stop reason: HOSPADM

## 2022-02-27 RX ORDER — ALBUTEROL SULFATE 90 UG/1
2 AEROSOL, METERED RESPIRATORY (INHALATION) EVERY 4 HOURS PRN
Status: DISCONTINUED | OUTPATIENT
Start: 2022-02-27 | End: 2022-03-10 | Stop reason: HOSPADM

## 2022-02-27 RX ADMIN — APIXABAN 5 MG: 5 TABLET, FILM COATED ORAL at 08:02

## 2022-02-27 RX ADMIN — LACOSAMIDE 100 MG: 100 TABLET, FILM COATED ORAL at 08:02

## 2022-02-27 RX ADMIN — DICLOFENAC SODIUM 4 G: 10 GEL TOPICAL at 08:02

## 2022-02-27 RX ADMIN — ALBUTEROL SULFATE 2 PUFF: 90 AEROSOL, METERED RESPIRATORY (INHALATION) at 12:02

## 2022-02-27 RX ADMIN — INSULIN ASPART 6 UNITS: 100 INJECTION, SOLUTION INTRAVENOUS; SUBCUTANEOUS at 12:02

## 2022-02-27 RX ADMIN — LIDOCAINE 1 PATCH: 50 PATCH CUTANEOUS at 02:02

## 2022-02-27 RX ADMIN — GABAPENTIN 200 MG: 100 CAPSULE ORAL at 09:02

## 2022-02-27 RX ADMIN — DICLOFENAC SODIUM 4 G: 10 GEL TOPICAL at 09:02

## 2022-02-27 RX ADMIN — PANTOPRAZOLE SODIUM 40 MG: 40 TABLET, DELAYED RELEASE ORAL at 09:02

## 2022-02-27 RX ADMIN — INSULIN ASPART 5 UNITS: 100 INJECTION, SOLUTION INTRAVENOUS; SUBCUTANEOUS at 08:02

## 2022-02-27 RX ADMIN — ALBUTEROL SULFATE 2 PUFF: 90 AEROSOL, METERED RESPIRATORY (INHALATION) at 07:02

## 2022-02-27 RX ADMIN — INSULIN ASPART 6 UNITS: 100 INJECTION, SOLUTION INTRAVENOUS; SUBCUTANEOUS at 08:02

## 2022-02-27 RX ADMIN — GABAPENTIN 100 MG: 100 CAPSULE ORAL at 08:02

## 2022-02-27 RX ADMIN — CLOPIDOGREL 75 MG: 75 TABLET, FILM COATED ORAL at 08:02

## 2022-02-27 RX ADMIN — ASPIRIN 81 MG: 81 TABLET, COATED ORAL at 08:02

## 2022-02-27 RX ADMIN — DICLOFENAC SODIUM 4 G: 10 GEL TOPICAL at 05:02

## 2022-02-27 RX ADMIN — DICLOFENAC SODIUM 4 G: 10 GEL TOPICAL at 12:02

## 2022-02-27 RX ADMIN — ALBUTEROL SULFATE 2 PUFF: 90 AEROSOL, METERED RESPIRATORY (INHALATION) at 03:02

## 2022-02-27 RX ADMIN — METOPROLOL TARTRATE 25 MG: 25 TABLET, FILM COATED ORAL at 08:02

## 2022-02-27 RX ADMIN — ATORVASTATIN CALCIUM 40 MG: 20 TABLET, FILM COATED ORAL at 08:02

## 2022-02-27 RX ADMIN — PANTOPRAZOLE SODIUM 40 MG: 40 TABLET, DELAYED RELEASE ORAL at 08:02

## 2022-02-27 RX ADMIN — LACOSAMIDE 100 MG: 100 TABLET, FILM COATED ORAL at 09:02

## 2022-02-27 RX ADMIN — MULTIPLE VITAMINS W/ MINERALS TAB 1 TABLET: TAB at 08:02

## 2022-02-27 RX ADMIN — GABAPENTIN 200 MG: 100 CAPSULE ORAL at 02:02

## 2022-02-27 RX ADMIN — INSULIN ASPART 5 UNITS: 100 INJECTION, SOLUTION INTRAVENOUS; SUBCUTANEOUS at 12:02

## 2022-02-27 RX ADMIN — OXYCODONE HYDROCHLORIDE AND ACETAMINOPHEN 500 MG: 500 TABLET ORAL at 08:02

## 2022-02-27 RX ADMIN — INSULIN DETEMIR 14 UNITS: 100 INJECTION, SOLUTION SUBCUTANEOUS at 09:02

## 2022-02-27 RX ADMIN — INSULIN ASPART 5 UNITS: 100 INJECTION, SOLUTION INTRAVENOUS; SUBCUTANEOUS at 05:02

## 2022-02-27 RX ADMIN — METOPROLOL TARTRATE 25 MG: 25 TABLET, FILM COATED ORAL at 09:02

## 2022-02-27 RX ADMIN — AMIODARONE HYDROCHLORIDE 200 MG: 200 TABLET ORAL at 08:02

## 2022-02-27 RX ADMIN — APIXABAN 5 MG: 5 TABLET, FILM COATED ORAL at 09:02

## 2022-02-27 RX ADMIN — OXYCODONE HYDROCHLORIDE AND ACETAMINOPHEN 500 MG: 500 TABLET ORAL at 09:02

## 2022-02-27 RX ADMIN — Medication 6 MG: at 09:02

## 2022-02-27 NOTE — PLAN OF CARE
Problem: Adult Inpatient Plan of Care  Goal: Plan of Care Review  Outcome: Ongoing, Progressing  Goal: Optimal Comfort and Wellbeing  Outcome: Ongoing, Progressing  Goal: Readiness for Transition of Care  Outcome: Ongoing, Progressing     Problem: Diabetes Comorbidity  Goal: Blood Glucose Level Within Targeted Range  Outcome: Ongoing, Progressing     Problem: Fluid and Electrolyte Imbalance (Acute Kidney Injury/Impairment)  Goal: Fluid and Electrolyte Balance  Outcome: Ongoing, Progressing     Problem: Renal Function Impairment (Acute Kidney Injury/Impairment)  Goal: Effective Renal Function  Outcome: Ongoing, Progressing     Problem: Impaired Wound Healing  Goal: Optimal Wound Healing  Outcome: Ongoing, Progressing     Problem: Fall Injury Risk  Goal: Absence of Fall and Fall-Related Injury  Outcome: Ongoing, Progressing     Continue accu checks AC/HS per orders. Oxygen as needed. Turning encouraged q2hrs. Monitoring.

## 2022-02-27 NOTE — SUBJECTIVE & OBJECTIVE
Interval History: Patient was seen and examined this am. Hypoglycemia this am. Responsive to orange juice and rodrick crackers- repeat w/in 70s. No further diarrhea or abdominal pain. Still w/SOB at bedtime, but no increasing oxygen requirements. No fevers, chills, night sweats, headache, rash, constipation, decrease in UOP.     Objective:     Vital Signs (Most Recent):  Temp: 97.6 °F (36.4 °C) (02/27/22 1700)  Pulse: 68 (02/27/22 1700)  Resp: 18 (02/27/22 1700)  BP: 138/66 (02/27/22 1700)  SpO2: (!) 94 % (02/27/22 1700) Vital Signs (24h Range):  Temp:  [97.5 °F (36.4 °C)-98.6 °F (37 °C)] 97.6 °F (36.4 °C)  Pulse:  [67-87] 68  Resp:  [16-18] 18  SpO2:  [90 %-95 %] 94 %  BP: (135-163)/(66-81) 138/66     Weight: 76.4 kg (168 lb 6.9 oz)  Body mass index is 21.63 kg/m².    Intake/Output Summary (Last 24 hours) at 2/27/2022 1735  Last data filed at 2/27/2022 1300  Gross per 24 hour   Intake 840 ml   Output 750 ml   Net 90 ml      Physical Exam  Gen: in NAD, appears stated age  Neuro: AAOx3, motor, sensory, and strength grossly intact BL  HEENT: NTNC, EOMI, PERRL, MMM  CVS: RRR, no m/r/g; S1/S2 auscultated with no S3 or S4; capillary refill < 2 sec  Resp: CTA BL, 2L BNC, no wheezes, no belabored breathing or accessory muscle use appreciated   Abd: BS+ in all 4 quadrants; NTND, soft to palpation; no organomegaly appreciated   Extrem: pulses full, equal, and regular over all 4 extremities; no UE or LE edema BL  Skin: St 3 sacral pressure ulcer- no purulence    Significant Labs: All pertinent labs within the past 24 hours have been reviewed.  CBC:   Recent Labs   Lab 02/26/22  0414 02/27/22  0228   WBC 11.30 8.72   HGB 11.7* 11.5*   HCT 36.5* 35.8*    235     CMP:   Recent Labs   Lab 02/26/22  0414 02/27/22  0228    137   K 3.6 4.3    103   CO2 27 22*   GLU 56* 277*   BUN 16 17   CREATININE 1.0 0.9   CALCIUM 8.2* 8.0*   PROT 5.8* 5.8*   ALBUMIN 1.8* 1.8*   BILITOT 0.5 0.6   ALKPHOS 109 113   AST 12 18   ALT  10 11   ANIONGAP 8 12   EGFRNONAA >60.0 >60.0       Significant Imaging: I have reviewed all pertinent imaging results/findings within the past 24 hours.    CXR 02/26:  FINDINGS:  AP portable chest radiographic examination is submitted.  When accounting for difference in position and technique the appearance of the cardiomediastinal silhouette is thought stable.     There appears to be mild prominence of the central pulmonary vascular, there is bilateral pattern of pulmonary opacity consistent with interstitial and ground-glass and patchy alveolar infiltrates, this appears superimposed on chronic change.  There is no evidence for large pleural effusion there is minimal blunting at the right costophrenic angle that may relate to a small amount of pleural fluid.  There is no evidence for pneumothorax.  The osseous structures demonstrate chronic change.     Impression:     Mild prominence of the central pulmonary vascular, there is appearance of bilateral opacity consistent with interstitial and ground-glass and patchy alveolar infiltrates.

## 2022-02-27 NOTE — CONSULTS
Thank you for your consult to [facility]. We have reviewed the patient chart. This patient does meet criteria for [facility] service at this time. Will assume care on 02/28/22 at 7AM.    Komal Huynh MD

## 2022-02-27 NOTE — PROGRESS NOTES
[facility] - [facility] ([address] Medicine  Progress Note    Patient Name: Kamar Muñoz  MRN: [id number]  Patient Class: IP- Inpatient   Admission Date: 2/19/2022  Length of Stay: 8 days  Attending Physician: Kelin Catalan MD  Primary Care Provider: Basim Guerrero MD        Subjective:     Principal Problem:Encephalopathy, metabolic        HPI:  Kamar Muñoz is a 76 year old male with past medical history of CAD s/p 2 LAUREN in RCA (Jan 2022), HTN, HLD, p. A. 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 History was not obtained from patient due to encephalopathy. Patient's daughter at bedside reports the patient was recently in the ED on 2/17 for a mechanical fall after being discharged from rehab the same day. CTH and cervical spine revealed  evolving late subacute infarct involving the right frontal lobe with questionable petechial hemorrhage. Vascular neurology evaluated the patient and cleared him for discharge from without any new interventions. He was also tested positive for COVID-19 and was discharged yesterday from the ED. He subsequently received one dose of sotrovimab infusion for COVID and was in a normal state of health until this morning when his daughter found him lethargic and barely responsive prompting her to bring him to the ED. She reports the patient had no complaints prior to developing his AMS. Of note, the patient was recently admitted to [facility] from 01/25 through 02/13 for AMS concerning for CVA, however he was treated for  metabolic encephalopathy 2/2 to DKA/HHS, sepsis and BRENDAN.      Upon arrival to the ED, he was placed on 6L NC, normotensive and tachycardic. Lactic 11.5, WBC 17.8, Glucose 1109, pH 7.17, Co2 15.6 HCO3 <5, AG unable to calculate. sCr 3.1. CXR with intersitial opacities. He received ~4L of IVF, vancomycin, zosyn, initiated on insulin shifted for hyperkalemia and calcium for cardioprotection. ECG without noticeable ischemic changes. CTH without new changes- discussed findings with radiology. Patient was admitted to the MICU for further management.        Overview/Hospital Course:  ICU Course:  Placed on remdesivir and broad spectrum IV abx in addition to insulin per DKA protocol. In MICU: Weaned supp O2 to room air; Transitioned to subq insulin; Improvement of AMS. BRENDAN improving gradually. Pt had discussion w/ family in MICU and transitioned from full code to DNR/DNI.  Step down to  initiated 2/21.    Hospital Medicine Course:  Pt w/ episode of CP and hypoglycemia upon stepdown. Ischemic work-up largely unremarkable with trop down trending from admission. Detemir dosing adjusted from BID to qhs. He had additional hypoglycemic episode upon re-uptitration of long-acting insulin from 12 to 15. Dosing decreased to 13u as patient was hyperglycemic to 230s w/12u qhs. Worsening hyperglycemia to 270s- insulin dose modified to 3u TID wm, and detemir 14u qhs.    Episode of abdominal pain and diarrhea 02/25- appeared to be 2/2 antibiotic use. Antibiotics stopped 02/25. Pt w/persistent diarrhea- c diff studies sent- loperamide stopped.     Pt was found to have St 3 sacral pressure ulcer.       Interval History: Patient was seen and examined this am. Hypoglycemia this am. Responsive to orange juice and rodrick crackers- repeat w/in 70s. No further diarrhea or abdominal pain. Still w/SOB at bedtime, but no increasing oxygen requirements. No fevers, chills, night sweats, headache, rash, constipation, decrease in UOP.     Objective:      Vital Signs (Most Recent):  Temp: 97.6 °F (36.4 °C) (02/27/22 1700)  Pulse: 68 (02/27/22 1700)  Resp: 18 (02/27/22 1700)  BP: 138/66 (02/27/22 1700)  SpO2: (!) 94 % (02/27/22 1700) Vital Signs (24h Range):  Temp:  [97.5 °F (36.4 °C)-98.6 °F (37 °C)] 97.6 °F (36.4 °C)  Pulse:  [67-87] 68  Resp:  [16-18] 18  SpO2:  [90 %-95 %] 94 %  BP: (135-163)/(66-81) 138/66     Weight: 76.4 kg (168 lb 6.9 oz)  Body mass index is 21.63 kg/m².    Intake/Output Summary (Last 24 hours) at 2/27/2022 1735  Last data filed at 2/27/2022 1300  Gross per 24 hour   Intake 840 ml   Output 750 ml   Net 90 ml      Physical Exam  Gen: in NAD, appears stated age  Neuro: AAOx3, motor, sensory, and strength grossly intact BL  HEENT: NTNC, EOMI, PERRL, MMM  CVS: RRR, no m/r/g; S1/S2 auscultated with no S3 or S4; capillary refill < 2 sec  Resp: CTA BL, 2L BNC, no wheezes, no belabored breathing or accessory muscle use appreciated   Abd: BS+ in all 4 quadrants; NTND, soft to palpation; no organomegaly appreciated   Extrem: pulses full, equal, and regular over all 4 extremities; no UE or LE edema BL  Skin: St 3 sacral pressure ulcer- no purulence    Significant Labs: All pertinent labs within the past 24 hours have been reviewed.  CBC:   Recent Labs   Lab 02/26/22 0414 02/27/22 0228   WBC 11.30 8.72   HGB 11.7* 11.5*   HCT 36.5* 35.8*    235     CMP:   Recent Labs   Lab 02/26/22 0414 02/27/22 0228    137   K 3.6 4.3    103   CO2 27 22*   GLU 56* 277*   BUN 16 17   CREATININE 1.0 0.9   CALCIUM 8.2* 8.0*   PROT 5.8* 5.8*   ALBUMIN 1.8* 1.8*   BILITOT 0.5 0.6   ALKPHOS 109 113   AST 12 18   ALT 10 11   ANIONGAP 8 12   EGFRNONAA >60.0 >60.0       Significant Imaging: I have reviewed all pertinent imaging results/findings within the past 24 hours.    CXR 02/26:  FINDINGS:  AP portable chest radiographic examination is submitted.  When accounting for difference in position and technique the appearance of the cardiomediastinal  silhouette is thought stable.     There appears to be mild prominence of the central pulmonary vascular, there is bilateral pattern of pulmonary opacity consistent with interstitial and ground-glass and patchy alveolar infiltrates, this appears superimposed on chronic change.  There is no evidence for large pleural effusion there is minimal blunting at the right costophrenic angle that may relate to a small amount of pleural fluid.  There is no evidence for pneumothorax.  The osseous structures demonstrate chronic change.     Impression:     Mild prominence of the central pulmonary vascular, there is appearance of bilateral opacity consistent with interstitial and ground-glass and patchy alveolar infiltrates.      Assessment/Plan:      *Patient now desiring placement at [facility] so that he may be with his wife.     Abdominal Pain, Diarrhea  - Resolved  - Likely 2/2 reflux/gastritis, but also component of antibiotic side effects  - stop loperamide, send for c diff testing   - prn tums for indigestion  - continue pantoprazole to 40mg BID     Hypoglycemia  - Resolved   - BG w/in 50s this am- improved to 70s following juice and rodrick crackers  - likely 2/2 excessive insulin in setting of poor PO intake     DM2  - Working to optimize BG control  - Increase levemir to 14u qhs, change aspart to 3u TID   - SSI provided for corrective dosing  - DXTs as ordered  - Hypoglycemic protocol in effect  - Diabetic diet provided     Unstageable sacral/glutteal ulcers  - Wound care following, appreciate recs  - chair cushion, immerse mattress/evolution bed, POA triad ointment BID/PRN  - Increase gabapentin to 200mg TID  - Lidocaine patch to lower back- diclofenac gel surrounding ulcer for further pain relief      COVID-19  Viral Pneumonia due to COVID-19  - Interval history and physical exam findings as described above  - COVID test positive on 02/17  - Currently satting 97% on 2L BNC  - S/p 5d of remdesivir  - Holding  Dex in setting of recent DKA  - Stopped doxy and zosyn 02/25  - PRN duonebs   - Continuous pulse ox bedside  - Appropriate isolation and airborne precautions in place  - Continuing to monitor  - Wean O2 as tolerated   - CXR w/persistent mild prominence of central pulm vascular- BL opacity consistent w/intersitital and GG and patchy aleveolar infiltrates- otherwise unchanged from previous imaging   - Incentive spirometer ordered   - Nutrition:           - Multivitamin PO daily          - Continue Boost supplement          - Vitamin D 1000IU daily if deficient          - Ascorbic acid 500mg PO bid    - Supportive Care:          - acetaminophen 650mg PO Q6hr PRN fever/headache          - loperamide PRN viral diarrhea     Acute hypoxemic respiratory failure  - 2/2 COVID infection  - maintain SpO2 88-92% given hx of COPD     Paroxysmal atrial fibrillation  History of paroxysmal atrial fibrillation on home  Metoprolol XL 50 mg daily, diltiazem  mg daily and amiodarone 200 mg daily.  - Continue po apixaban  - Continue amiodarone  - metoprolol and diltiazem held in MICU  in the setting of sepsis.   - resume BB and CBB as clinically indicated and tolerated per BP     Palliative care status  - DNR/DNI     Focal seizures  Continue home lancosamide      Embolic stroke involving right middle cerebral artery  Hx of CVA in Jan 2022. CT Head with evolving infarcts in right frontal and left cerebellar hemispheres. No concern for acute stroke per discussion with radiology.      - Continue home antiplatelets, statin and eliquis     Coronary artery disease involving native coronary artery of native heart with unstable angina pectoris  Hx of CAD s/p placement of 2 LAUREN in RCA in 01/09/2022.      - Continue home ASA, plavix and statin     Pulmonary nodules  Has stable bilateral  pulmonary masses which could be malignancy per outpatient pulmonology notes. No pathology report as none of the nodules appeared to be safe for biopsy given  high risk of PTX is high with many adjacent bulla.       Chronic pulmonary heart disease  Follows up with Pulm clinic in [place]. Last seen in December and was evaluated for pulmonary masses. Deemed to have mild COPD per notes. Not on any maintenance therapy.     - Continue albuterol inhaler      Hypertension associated with diabetes  - BP currently well-controlled  - Continue home regimen of metoprolol 25mg BID  - Will continue to monitor and further titrate antihypertensives as clinically indicated      Resolved  - DKA  - Encephalopathy  - BRENDAN  - Severe Sepsis    Discharge Planning   ALLIE: 2/28/2022     Code Status: DNR   Is the patient medically ready for discharge?: Yes    Reason for patient still in hospital (select all that apply): Pending disposition  Discharge Plan A: Rehab   Discharge Delays: None known at this time            Kelin Catalan MD  Department of Hospital Medicine   [facility] - Intensive Care ([place]-[facility])

## 2022-02-28 PROBLEM — L89.300 PRESSURE INJURY OF BUTTOCK, UNSTAGEABLE: Status: ACTIVE | Noted: 2022-02-28

## 2022-02-28 LAB
ALBUMIN SERPL BCP-MCNC: 1.8 G/DL (ref 3.5–5.2)
ALP SERPL-CCNC: 109 U/L (ref 55–135)
ALT SERPL W/O P-5'-P-CCNC: 9 U/L (ref 10–44)
ANION GAP SERPL CALC-SCNC: 13 MMOL/L (ref 8–16)
AST SERPL-CCNC: 20 U/L (ref 10–40)
BASOPHILS # BLD AUTO: 0.1 K/UL (ref 0–0.2)
BASOPHILS NFR BLD: 1.1 % (ref 0–1.9)
BILIRUB SERPL-MCNC: 0.6 MG/DL (ref 0.1–1)
BUN SERPL-MCNC: 22 MG/DL (ref 8–23)
CALCIUM SERPL-MCNC: 8 MG/DL (ref 8.7–10.5)
CHLORIDE SERPL-SCNC: 103 MMOL/L (ref 95–110)
CO2 SERPL-SCNC: 20 MMOL/L (ref 23–29)
CREAT SERPL-MCNC: 1 MG/DL (ref 0.5–1.4)
DIFFERENTIAL METHOD: ABNORMAL
EOSINOPHIL # BLD AUTO: 0.8 K/UL (ref 0–0.5)
EOSINOPHIL NFR BLD: 8.9 % (ref 0–8)
ERYTHROCYTE [DISTWIDTH] IN BLOOD BY AUTOMATED COUNT: 14.9 % (ref 11.5–14.5)
EST. GFR  (AFRICAN AMERICAN): >60 ML/MIN/1.73 M^2
EST. GFR  (NON AFRICAN AMERICAN): >60 ML/MIN/1.73 M^2
GLUCOSE SERPL-MCNC: 294 MG/DL (ref 70–110)
GLUCOSE SERPL-MCNC: 309 MG/DL (ref 70–110)
GLUCOSE SERPL-MCNC: 337 MG/DL (ref 70–110)
HCT VFR BLD AUTO: 36.8 % (ref 40–54)
HGB BLD-MCNC: 11.9 G/DL (ref 14–18)
IMM GRANULOCYTES # BLD AUTO: 0.08 K/UL (ref 0–0.04)
IMM GRANULOCYTES NFR BLD AUTO: 0.9 % (ref 0–0.5)
LYMPHOCYTES # BLD AUTO: 1.1 K/UL (ref 1–4.8)
LYMPHOCYTES NFR BLD: 11.8 % (ref 18–48)
MAGNESIUM SERPL-MCNC: 1.6 MG/DL (ref 1.6–2.6)
MCH RBC QN AUTO: 27.7 PG (ref 27–31)
MCHC RBC AUTO-ENTMCNC: 32.3 G/DL (ref 32–36)
MCV RBC AUTO: 86 FL (ref 82–98)
MONOCYTES # BLD AUTO: 0.9 K/UL (ref 0.3–1)
MONOCYTES NFR BLD: 9.2 % (ref 4–15)
NEUTROPHILS # BLD AUTO: 6.4 K/UL (ref 1.8–7.7)
NEUTROPHILS NFR BLD: 68.1 % (ref 38–73)
NRBC BLD-RTO: 0 /100 WBC
PHOSPHATE SERPL-MCNC: 2.8 MG/DL (ref 2.7–4.5)
PLATELET # BLD AUTO: 256 K/UL (ref 150–450)
PMV BLD AUTO: 10 FL (ref 9.2–12.9)
POCT GLUCOSE: 245 MG/DL (ref 70–110)
POCT GLUCOSE: 294 MG/DL (ref 70–110)
POCT GLUCOSE: 309 MG/DL (ref 70–110)
POCT GLUCOSE: 321 MG/DL (ref 70–110)
POTASSIUM SERPL-SCNC: 4.8 MMOL/L (ref 3.5–5.1)
PROT SERPL-MCNC: 6 G/DL (ref 6–8.4)
RBC # BLD AUTO: 4.29 M/UL (ref 4.6–6.2)
SODIUM SERPL-SCNC: 136 MMOL/L (ref 136–145)
WBC # BLD AUTO: 9.33 K/UL (ref 3.9–12.7)

## 2022-02-28 PROCEDURE — 99900035 HC TECH TIME PER 15 MIN (STAT): Mod: HCNC

## 2022-02-28 PROCEDURE — 94761 N-INVAS EAR/PLS OXIMETRY MLT: CPT | Mod: HCNC

## 2022-02-28 PROCEDURE — 83735 ASSAY OF MAGNESIUM: CPT | Mod: HCNC | Performed by: STUDENT IN AN ORGANIZED HEALTH CARE EDUCATION/TRAINING PROGRAM

## 2022-02-28 PROCEDURE — 99232 PR SUBSEQUENT HOSPITAL CARE,LEVL II: ICD-10-PCS | Mod: HCNC,95,, | Performed by: INTERNAL MEDICINE

## 2022-02-28 PROCEDURE — 87449 NOS EACH ORGANISM AG IA: CPT | Mod: HCNC | Performed by: STUDENT IN AN ORGANIZED HEALTH CARE EDUCATION/TRAINING PROGRAM

## 2022-02-28 PROCEDURE — 25000003 PHARM REV CODE 250: Mod: HCNC | Performed by: STUDENT IN AN ORGANIZED HEALTH CARE EDUCATION/TRAINING PROGRAM

## 2022-02-28 PROCEDURE — 25000003 PHARM REV CODE 250: Mod: HCNC | Performed by: INTERNAL MEDICINE

## 2022-02-28 PROCEDURE — 27000207 HC ISOLATION: Mod: HCNC

## 2022-02-28 PROCEDURE — 84100 ASSAY OF PHOSPHORUS: CPT | Mod: HCNC | Performed by: STUDENT IN AN ORGANIZED HEALTH CARE EDUCATION/TRAINING PROGRAM

## 2022-02-28 PROCEDURE — 85025 COMPLETE CBC W/AUTO DIFF WBC: CPT | Mod: HCNC | Performed by: STUDENT IN AN ORGANIZED HEALTH CARE EDUCATION/TRAINING PROGRAM

## 2022-02-28 PROCEDURE — 12000002 HC ACUTE/MED SURGE SEMI-PRIVATE ROOM: Mod: HCNC

## 2022-02-28 PROCEDURE — 99232 SBSQ HOSP IP/OBS MODERATE 35: CPT | Mod: HCNC,95,, | Performed by: INTERNAL MEDICINE

## 2022-02-28 PROCEDURE — 80053 COMPREHEN METABOLIC PANEL: CPT | Mod: HCNC | Performed by: STUDENT IN AN ORGANIZED HEALTH CARE EDUCATION/TRAINING PROGRAM

## 2022-02-28 PROCEDURE — 27000221 HC OXYGEN, UP TO 24 HOURS: Mod: HCNC

## 2022-02-28 RX ORDER — ACETAMINOPHEN 325 MG/1
650 TABLET ORAL 3 TIMES DAILY
Status: DISCONTINUED | OUTPATIENT
Start: 2022-02-28 | End: 2022-03-10 | Stop reason: HOSPADM

## 2022-02-28 RX ORDER — DICLOFENAC SODIUM 10 MG/G
4 GEL TOPICAL 4 TIMES DAILY
Status: DISCONTINUED | OUTPATIENT
Start: 2022-02-28 | End: 2022-03-10 | Stop reason: HOSPADM

## 2022-02-28 RX ADMIN — ACETAMINOPHEN 650 MG: 325 TABLET ORAL at 09:02

## 2022-02-28 RX ADMIN — INSULIN ASPART 6 UNITS: 100 INJECTION, SOLUTION INTRAVENOUS; SUBCUTANEOUS at 12:02

## 2022-02-28 RX ADMIN — MULTIPLE VITAMINS W/ MINERALS TAB 1 TABLET: TAB at 08:02

## 2022-02-28 RX ADMIN — DICLOFENAC SODIUM 4 G: 10 GEL TOPICAL at 12:02

## 2022-02-28 RX ADMIN — ACETAMINOPHEN 650 MG: 325 TABLET ORAL at 02:02

## 2022-02-28 RX ADMIN — DICLOFENAC SODIUM 4 G: 10 GEL TOPICAL at 08:02

## 2022-02-28 RX ADMIN — PANTOPRAZOLE SODIUM 40 MG: 40 TABLET, DELAYED RELEASE ORAL at 09:02

## 2022-02-28 RX ADMIN — METOPROLOL TARTRATE 25 MG: 25 TABLET, FILM COATED ORAL at 09:02

## 2022-02-28 RX ADMIN — AMIODARONE HYDROCHLORIDE 200 MG: 200 TABLET ORAL at 08:02

## 2022-02-28 RX ADMIN — ASPIRIN 81 MG: 81 TABLET, COATED ORAL at 08:02

## 2022-02-28 RX ADMIN — CLOPIDOGREL 75 MG: 75 TABLET, FILM COATED ORAL at 08:02

## 2022-02-28 RX ADMIN — PANTOPRAZOLE SODIUM 40 MG: 40 TABLET, DELAYED RELEASE ORAL at 08:02

## 2022-02-28 RX ADMIN — Medication 6 MG: at 09:02

## 2022-02-28 RX ADMIN — OXYCODONE HYDROCHLORIDE AND ACETAMINOPHEN 500 MG: 500 TABLET ORAL at 08:02

## 2022-02-28 RX ADMIN — LACOSAMIDE 100 MG: 100 TABLET, FILM COATED ORAL at 08:02

## 2022-02-28 RX ADMIN — GABAPENTIN 200 MG: 100 CAPSULE ORAL at 09:02

## 2022-02-28 RX ADMIN — INSULIN ASPART 3 UNITS: 100 INJECTION, SOLUTION INTRAVENOUS; SUBCUTANEOUS at 05:02

## 2022-02-28 RX ADMIN — OXYCODONE HYDROCHLORIDE AND ACETAMINOPHEN 500 MG: 500 TABLET ORAL at 09:02

## 2022-02-28 RX ADMIN — LACOSAMIDE 100 MG: 100 TABLET, FILM COATED ORAL at 09:02

## 2022-02-28 RX ADMIN — INSULIN DETEMIR 14 UNITS: 100 INJECTION, SOLUTION SUBCUTANEOUS at 09:02

## 2022-02-28 RX ADMIN — APIXABAN 5 MG: 5 TABLET, FILM COATED ORAL at 09:02

## 2022-02-28 RX ADMIN — INSULIN ASPART 3 UNITS: 100 INJECTION, SOLUTION INTRAVENOUS; SUBCUTANEOUS at 08:02

## 2022-02-28 RX ADMIN — METOPROLOL TARTRATE 25 MG: 25 TABLET, FILM COATED ORAL at 08:02

## 2022-02-28 RX ADMIN — GABAPENTIN 200 MG: 100 CAPSULE ORAL at 02:02

## 2022-02-28 RX ADMIN — DICLOFENAC SODIUM 4 G: 10 GEL TOPICAL at 05:02

## 2022-02-28 RX ADMIN — INSULIN ASPART 8 UNITS: 100 INJECTION, SOLUTION INTRAVENOUS; SUBCUTANEOUS at 08:02

## 2022-02-28 RX ADMIN — APIXABAN 5 MG: 5 TABLET, FILM COATED ORAL at 08:02

## 2022-02-28 RX ADMIN — GABAPENTIN 200 MG: 100 CAPSULE ORAL at 08:02

## 2022-02-28 RX ADMIN — DICLOFENAC SODIUM 4 G: 10 GEL TOPICAL at 09:02

## 2022-02-28 RX ADMIN — INSULIN ASPART 4 UNITS: 100 INJECTION, SOLUTION INTRAVENOUS; SUBCUTANEOUS at 05:02

## 2022-02-28 RX ADMIN — INSULIN ASPART 4 UNITS: 100 INJECTION, SOLUTION INTRAVENOUS; SUBCUTANEOUS at 09:02

## 2022-02-28 RX ADMIN — INSULIN ASPART 3 UNITS: 100 INJECTION, SOLUTION INTRAVENOUS; SUBCUTANEOUS at 12:02

## 2022-02-28 RX ADMIN — LIDOCAINE 1 PATCH: 50 PATCH CUTANEOUS at 02:02

## 2022-02-28 RX ADMIN — ATORVASTATIN CALCIUM 40 MG: 20 TABLET, FILM COATED ORAL at 08:02

## 2022-02-28 NOTE — PLAN OF CARE
Problem: Adult Inpatient Plan of Care  Goal: Plan of Care Review  Outcome: Ongoing, Progressing  Goal: Patient-Specific Goal (Individualized)  Outcome: Ongoing, Progressing  Goal: Absence of Hospital-Acquired Illness or Injury  Outcome: Ongoing, Progressing  Goal: Optimal Comfort and Wellbeing  Outcome: Ongoing, Progressing  Goal: Readiness for Transition of Care  Outcome: Ongoing, Progressing     Problem: Diabetes Comorbidity  Goal: Blood Glucose Level Within Targeted Range  Outcome: Ongoing, Progressing     Problem: Impaired Wound Healing  Goal: Optimal Wound Healing  Outcome: Ongoing, Progressing     AAOx4. Remains on 1.5 L NC. Awaiting cdiff test result. Special precaution to r/o c-diff. Moderate loose BM today. Blood sugar strictly monitored. C/o pain to bilateral heels. Administered tylenol PRN for mild pain & moderately effective. Turned and repositioned as needed. Instructed patient to call for assistance.

## 2022-02-28 NOTE — PLAN OF CARE
JASIEL faxed referral to [facility]. JASIEL will continue to follow.     2:54 PM  JASIEL spoke with Brunilda at [facility] [phone], who reported they do not accept the patent's insurance. Per Brunilda, she spoke with the patient's son to provide an update.     JASIEL spoke with the patient's on [employer] [phone] regarding the patient's d/c plans. Per [employer], the patient will need either Rehab or SNF prior to being discharged. JASIEL provided [employer] with JASIEL contact information. JASIEL will continue to follow.     Marley Gregg LMSW   - [facility]  Ext. [phone]

## 2022-02-28 NOTE — HPI
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 Patient was admitted to the MICU for further management. Wound care consulted for evaluation of skin breakdown.

## 2022-02-28 NOTE — SUBJECTIVE & OBJECTIVE
Scheduled Meds:   amiodarone  200 mg Oral Daily    apixaban  5 mg Oral BID    ascorbic acid (vitamin C)  500 mg Oral BID    aspirin  81 mg Oral Daily    atorvastatin  40 mg Oral Daily    clopidogreL  75 mg Oral Daily    diclofenac sodium  4 g Topical (Top) QID    gabapentin  200 mg Oral TID    insulin aspart U-100  3 Units Subcutaneous TIDWM    insulin detemir U-100  14 Units Subcutaneous QHS    lacosamide  100 mg Oral Q12H    LIDOcaine  1 patch Transdermal Q24H    metoprolol tartrate  25 mg Oral BID    multivitamin  1 tablet Oral Daily    pantoprazole  40 mg Oral BID     Continuous Infusions:  PRN Meds:acetaminophen, albuterol **AND** MDI Q4H, calcium carbonate, dextrose 10%, dextrose 10%, glucagon (human recombinant), glucose, glucose, insulin aspart U-100, melatonin, ondansetron, oxyCODONE, sodium chloride 0.9%    Review of patient's allergies indicates:   Allergen Reactions    Iodine      Other reaction(s): swelling  Other reaction(s): Itching  Other reaction(s): Rash        Past Medical History:   Diagnosis Date    Arthritis     Coronary artery disease     Diabetes mellitus type II     Hyperlipidemia     Hypertension     Kidney stone     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 8/2/2012    STEMI involving right coronary artery 1/9/2022    Type II or unspecified type diabetes mellitus with neurological manifestations, uncontrolled(250.62) 3/8/2013    Urinary tract infection      Past Surgical History:   Procedure Laterality Date    BACK SURGERY      CATARACT EXTRACTION W/  INTRAOCULAR LENS IMPLANT Right     Per Dr Romero note 11/2018    COLONOSCOPY N/A 1/28/2019    Procedure: COLONOSCOPY Suprep;  Surgeon: Anh Johnson MD;  Location: [facility] ENDO;  Service: Endoscopy;  Laterality: N/A;    EYE SURGERY      HERNIA REPAIR      LEFT HEART CATHETERIZATION Left 1/9/2022    Procedure: CATHETERIZATION, HEART, LEFT;  Surgeon: Will Hurst III, MD;  Location: [facility] CATH LAB/EP;  Service: Cardiology;  Laterality: Left;     renal stones      SHOULDER OPEN ROTATOR CUFF REPAIR         Family History    None       Tobacco Use    Smoking status: Former Smoker     Packs/day: 1.50     Years: 25.00     Pack years: 37.50     Quit date: 1983     Years since quittin.1    Smokeless tobacco: Never Used   Substance and Sexual Activity    Alcohol use: No    Drug use: No    Sexual activity: Yes     Partners: Female     Review of Systems   Skin:  Positive for wound.     Objective:     Vital Signs (Most Recent):  Temp: 97.4 °F (36.3 °C) (22 1130)  Pulse: 68 (22 1130)  Resp: 16 (22 1130)  BP: (!) 102/58 (22 1130)  SpO2: 98 % (22 1130)   Vital Signs (24h Range):  Temp:  [96.1 °F (35.6 °C)-97.9 °F (36.6 °C)] 97.4 °F (36.3 °C)  Pulse:  [60-71] 68  Resp:  [16-18] 16  SpO2:  [91 %-98 %] 98 %  BP: (102-141)/(58-74) 102/58     Weight: 76.4 kg (168 lb 6.9 oz)  Body mass index is 21.63 kg/m².  Physical Exam  Constitutional:       Appearance: Normal appearance.   Skin:     General: Skin is warm and dry.      Findings: Lesion present.   Neurological:      Mental Status: He is alert.       Laboratory:  All pertinent labs reviewed within the last 24 hours.    Diagnostic Results:  None

## 2022-02-28 NOTE — PLAN OF CARE
Problem: Adult Inpatient Plan of Care  Goal: Plan of Care Review  Outcome: Ongoing, Progressing  Goal: Patient-Specific Goal (Individualized)  Outcome: Ongoing, Progressing  Goal: Absence of Hospital-Acquired Illness or Injury  Outcome: Ongoing, Progressing  Goal: Optimal Comfort and Wellbeing  Outcome: Ongoing, Progressing  Goal: Readiness for Transition of Care  Outcome: Ongoing, Progressing     Problem: Diabetes Comorbidity  Goal: Blood Glucose Level Within Targeted Range  Outcome: Ongoing, Progressing     Problem: Glycemic Control Impaired (Sepsis/Septic Shock)  Goal: Blood Glucose Level Within Desired Range  Outcome: Ongoing, Progressing     AAOx4. Remains on isolation precautions. No c/o pain or discomfort during shift. Turned and repositioned frequently. Remains on 1L NC. No BM today. Blood sugar strictly monitored. VSS. Instructed to call for assistance

## 2022-02-28 NOTE — ASSESSMENT & PLAN NOTE
- pt evaluated for skin breakdown.  - Unstageable pressure injuries noted to left buttock and gluteal cleft.  - continue Triad bid/prn.  - nursing to maintain pressure injury prevention measures.

## 2022-02-28 NOTE — PLAN OF CARE
No acute events throughout night. Pt AAO X 4; able to express needs.  No c/o pain.  Incontinent.  Diaper changed  X3.  Covid and Safety precautions maintained.  Bed in low position,  call  light in reach.

## 2022-02-28 NOTE — SUBJECTIVE & OBJECTIVE
This encounter was provided through telemedicine.  Patient was transferred to the telemedicine service on:  02/28/2022   The patient location is: [address] A admitted 2/19/2022  6:57 PM.  Present with the patient at the time of the telemed/virtual assessment: Family member    Interval History/Overnight Events:   Clinical record since admit reviewed.    Patient only complains of diarrhea which occurred this am ; no dyspnea or cough but remains on oxygen at 2L NC; no abd pain; relying on glucerna and not eating much of provided diet; his son will try to call and order food from cafeteria to see is this encourages more intake. No hx of lactose intolerance and no laxatives currently.  Patient's wife is also admitted. Patient states he will participate with therapy.  Also encouraged to turn with nursing; pain to buttocks and feet (pressed against bed and nurisng to investigate extension for bed). C. diff pending.    Review of Systems   Constitutional:  Positive for activity change, appetite change and fatigue. Negative for fever.   Respiratory:  Negative for cough and shortness of breath.    Gastrointestinal:  Positive for diarrhea. Negative for vomiting.   Musculoskeletal:  Positive for arthralgias, back pain and myalgias.      Inpatient Medications:  Scheduled Meds:   amiodarone  200 mg Oral Daily    apixaban  5 mg Oral BID    ascorbic acid (vitamin C)  500 mg Oral BID    aspirin  81 mg Oral Daily    atorvastatin  40 mg Oral Daily    clopidogreL  75 mg Oral Daily    diclofenac sodium  4 g Topical (Top) QID    gabapentin  200 mg Oral TID    insulin aspart U-100  3 Units Subcutaneous TIDWM    insulin detemir U-100  14 Units Subcutaneous QHS    lacosamide  100 mg Oral Q12H    LIDOcaine  1 patch Transdermal Q24H    metoprolol tartrate  25 mg Oral BID    multivitamin  1 tablet Oral Daily    pantoprazole  40 mg Oral BID     Continuous Infusions:  PRN Meds:.acetaminophen, albuterol **AND** MDI Q4H, calcium carbonate,  dextrose 10%, dextrose 10%, glucagon (human recombinant), glucose, glucose, insulin aspart U-100, melatonin, ondansetron, oxyCODONE, sodium chloride 0.9%      Objective:     Temp:  [96.1 °F (35.6 °C)-98.3 °F (36.8 °C)] 98.3 °F (36.8 °C)  Pulse:  [60-71] 65  Resp:  [16-18] 16  SpO2:  [91 %-98 %] 96 %  BP: (102-141)/(57-74) 113/57      Intake/Output Summary (Last 24 hours) at 2/28/2022 1622  Last data filed at 2/28/2022 1200  Gross per 24 hour   Intake 1200 ml   Output 400 ml   Net 800 ml        Body mass index is 21.63 kg/m².    Physical Exam  Vitals and nursing note reviewed.   Constitutional:       General: He is not in acute distress.     Appearance: He is normal weight. He is ill-appearing.   HENT:      Head: Normocephalic and atraumatic.      Right Ear: Hearing normal.      Left Ear: Hearing normal.      Nose: Nose normal.   Eyes:      General: No scleral icterus.        Right eye: No discharge.         Left eye: No discharge.      Extraocular Movements: Extraocular movements intact.   Cardiovascular:      Rate and Rhythm: Normal rate.   Pulmonary:      Effort: Pulmonary effort is normal. No accessory muscle usage or respiratory distress.   Skin:     Findings: No rash.   Neurological:      General: No focal deficit present.      Mental Status: He is alert. Mental status is at baseline.      Cranial Nerves: No cranial nerve deficit.      Motor: No weakness.   Psychiatric:         Attention and Perception: Attention normal.         Mood and Affect: Mood normal.         Speech: Speech normal.         Behavior: Behavior is cooperative.        Labs:  Recent Results (from the past 24 hour(s))   POCT glucose    Collection Time: 02/27/22  5:11 PM   Result Value Ref Range    POCT Glucose 181 (H) 70 - 110 mg/dL   POCT glucose    Collection Time: 02/27/22  8:36 PM   Result Value Ref Range    POCT Glucose 189 (H) 70 - 110 mg/dL   Lactate Dehydrogenase    Collection Time: 02/27/22  9:30 PM   Result Value Ref Range      110 - 260 U/L   Ferritin    Collection Time: 02/27/22  9:30 PM   Result Value Ref Range    Ferritin 312 (H) 20.0 - 300.0 ng/mL   D-Dimer, Quantitative    Collection Time: 02/27/22  9:30 PM   Result Value Ref Range    D-Dimer 0.37 <0.50 mg/L [facility]   Comprehensive metabolic panel    Collection Time: 02/28/22  6:06 AM   Result Value Ref Range    Sodium 136 136 - 145 mmol/L    Potassium 4.8 3.5 - 5.1 mmol/L    Chloride 103 95 - 110 mmol/L    CO2 20 (L) 23 - 29 mmol/L    Glucose 337 (H) 70 - 110 mg/dL    BUN 22 8 - 23 mg/dL    Creatinine 1.0 0.5 - 1.4 mg/dL    Calcium 8.0 (L) 8.7 - 10.5 mg/dL    Total Protein 6.0 6.0 - 8.4 g/dL    Albumin 1.8 (L) 3.5 - 5.2 g/dL    Total Bilirubin 0.6 0.1 - 1.0 mg/dL    Alkaline Phosphatase 109 55 - 135 U/L    AST 20 10 - 40 U/L    ALT 9 (L) 10 - 44 U/L    Anion Gap 13 8 - 16 mmol/L    eGFR if African American >60.0 >60 mL/min/1.73 m^2    eGFR if non African American >60.0 >60 mL/min/1.73 m^2   Magnesium    Collection Time: 02/28/22  6:06 AM   Result Value Ref Range    Magnesium 1.6 1.6 - 2.6 mg/dL   CBC auto differential    Collection Time: 02/28/22  6:06 AM   Result Value Ref Range    WBC 9.33 3.90 - 12.70 K/uL    RBC 4.29 (L) 4.60 - 6.20 M/uL    Hemoglobin 11.9 (L) 14.0 - 18.0 g/dL    Hematocrit 36.8 (L) 40.0 - 54.0 %    MCV 86 82 - 98 fL    MCH 27.7 27.0 - 31.0 pg    MCHC 32.3 32.0 - 36.0 g/dL    RDW 14.9 (H) 11.5 - 14.5 %    Platelets 256 150 - 450 K/uL    MPV 10.0 9.2 - 12.9 fL    Immature Granulocytes 0.9 (H) 0.0 - 0.5 %    Gran # (ANC) 6.4 1.8 - 7.7 K/uL    Immature Grans (Abs) 0.08 (H) 0.00 - 0.04 K/uL    Lymph # 1.1 1.0 - 4.8 K/uL    Mono # 0.9 0.3 - 1.0 K/uL    Eos # 0.8 (H) 0.0 - 0.5 K/uL    Baso # 0.10 0.00 - 0.20 K/uL    nRBC 0 0 /100 WBC    Gran % 68.1 38.0 - 73.0 %    Lymph % 11.8 (L) 18.0 - 48.0 %    Mono % 9.2 4.0 - 15.0 %    Eosinophil % 8.9 (H) 0.0 - 8.0 %    Basophil % 1.1 0.0 - 1.9 %    Differential Method Automated    Phosphorus    Collection Time: 02/28/22  6:06 AM    Result Value Ref Range    Phosphorus 2.8 2.7 - 4.5 mg/dL   POCT glucose    Collection Time: 02/28/22  8:03 AM   Result Value Ref Range    POCT Glucose 309 (H) 70 - 110 mg/dL   POCT Glucose, Hand-Held Device    Collection Time: 02/28/22  8:04 AM   Result Value Ref Range    POC Glucose 309 (A) 70 - 110 MG/DL   POCT glucose    Collection Time: 02/28/22 10:59 AM   Result Value Ref Range    POCT Glucose 294 (H) 70 - 110 mg/dL   POCT Glucose, Hand-Held Device    Collection Time: 02/28/22 11:05 AM   Result Value Ref Range    POC Glucose 294 (A) 70 - 110 MG/DL        Lab Results   Component Value Date    [id number] Positive (A) 02/17/2022       Recent Labs   Lab 02/26/22 0414 02/27/22 0228 02/28/22  0606   WBC 11.30 8.72 9.33   LYMPH 14.6*  1.7 15.4*  1.3 11.8*  1.1   HGB 11.7* 11.5* 11.9*   HCT 36.5* 35.8* 36.8*    235 256     Recent Labs   Lab 02/26/22 0413 02/26/22 0414 02/27/22 0228 02/28/22  0606   NA  --  141 137 136   K  --  3.6 4.3 4.8   CL  --  106 103 103   CO2  --  27 22* 20*   BUN  --  16 17 22   CREATININE  --  1.0 0.9 1.0   GLU  --  56* 277* 337*   CALCIUM  --  8.2* 8.0* 8.0*   MG  --  1.6 1.6 1.6   PHOS 2.3*  --  3.0 2.8     Recent Labs   Lab 02/26/22 0414 02/27/22 0228 02/28/22  0606   ALKPHOS 109 113 109   ALT 10 11 9*   AST 12 18 20   ALBUMIN 1.8* 1.8* 1.8*   PROT 5.8* 5.8* 6.0   BILITOT 0.5 0.6 0.6        Recent Labs     02/25/22 2139 02/27/22 2130   DDIMER 0.47 0.37   FERRITIN 337* 312*   * 217       All labs within the last 24 hours were reviewed.     Microbiology:  Microbiology Results (last 7 days)       Procedure Component Value Units Date/Time    Clostridium difficile EIA [[id number]] Collected: 02/28/22 1054    Order Status: Sent Specimen: Stool Updated: 02/28/22 1201    Blood culture x two cultures. Draw prior to antibiotics. [[id number]] Collected: 02/19/22 1932    Order Status: Completed Specimen: Blood from Peripheral, Hand, Left Updated: 02/24/22 2212     Blood  Culture, Routine No growth after 5 days.    Narrative:      Aerobic and anaerobic    Blood culture x two cultures. Draw prior to antibiotics. [[id number]] Collected: 02/19/22 1932    Order Status: Completed Specimen: Blood from Peripheral, Hand, Left Updated: 02/24/22 2212     Blood Culture, Routine No growth after 5 days.    Narrative:      Aerobic and anaerobic              Imaging  ECG Results              EKG 12-lead (Final result)  Result time 02/20/22 09:32:21      Final result by Interface, Lab In [facility] (02/20/22 09:32:21)                   Narrative:    Test Reason :     Vent. Rate : 126 BPM     Atrial Rate : 120 BPM     P-R Int : 000 ms          QRS Dur : 162 ms      QT Int : 412 ms       P-R-T Axes : 000 059 -31 degrees     QTc Int : 596 ms    Wide QRS tachycardia  Right bundle branch block  T wave abnormality, consider inferior ischemia  Abnormal ECG  When compared with ECG of 19-FEB-2022 19:09,  Wide QRS tachycardia has replaced Junctional rhythm or SVT  Confirmed by Lencho BARROS MD ([id number]) on 2/20/2022 9:32:14 AM    Referred By: [email or web address]   SELF           Confirmed By:Lencho BARROS MD                                     Repeat EKG 12-lead (Final result)  Result time 02/20/22 09:34:32      Final result by Interface, Lab In [facility] (02/20/22 09:34:32)                   Narrative:    Test Reason :     Vent. Rate : 118 BPM     Atrial Rate : 117 BPM     P-R Int : 000 ms          QRS Dur : 118 ms      QT Int : 374 ms       P-R-T Axes : 000 056 -21 degrees     QTc Int : 524 ms    Accelerated Junctional rhythm vs SVT with artifact  Nonspecific intraventricular conduction delay  ST and T wave abnormality, consider inferior ischemia  Prolonged QT  Abnormal ECG  When compared with ECG of 27-JAN-2022 00:52,  Rhythm changed  Vent. rate has increased BY  50 BPM  Questionable change in QRS duration  Confirmed by Lencho BARROS MD ([id number]) on 2/20/2022 9:34:22 AM    Referred By: GAGAN   SELF           Confirmed By:Lencho BARROS  MD                                    Results for orders placed during the hospital encounter of 01/25/22    Echo    Interpretation Summary  · There is abnormal septal wall motion.  · The left ventricle is normal in size with low normal systolic function.  · The estimated ejection fraction is 50%.  · Normal left ventricular diastolic function.  · Mild left atrial enlargement.  · Normal right ventricular size with normal right ventricular systolic function.  · Mild mitral regurgitation.  · There are segmental left ventricular wall motion abnormalities.  · Mild tricuspid regurgitation.  · Elevated central venous pressure (15 mmHg).      X-Ray Chest AP Portable  Narrative: EXAMINATION:  XR CHEST AP PORTABLE    CLINICAL HISTORY:  Sob, cough;    TECHNIQUE:  Single frontal view of the chest was performed.    COMPARISON:  Chest radiograph February 19, 2022    FINDINGS:  AP portable chest radiographic examination is submitted.  When accounting for difference in position and technique the appearance of the cardiomediastinal silhouette is thought stable.    There appears to be mild prominence of the central pulmonary vascular, there is bilateral pattern of pulmonary opacity consistent with interstitial and ground-glass and patchy alveolar infiltrates, this appears superimposed on chronic change.  There is no evidence for large pleural effusion there is minimal blunting at the right costophrenic angle that may relate to a small amount of pleural fluid.  There is no evidence for pneumothorax.  The osseous structures demonstrate chronic change.  Impression: Mild prominence of the central pulmonary vascular, there is appearance of bilateral opacity consistent with interstitial and ground-glass and patchy alveolar infiltrates.    Electronically signed by: Frantz Lopez  Date:    02/25/2022  Time:    22:03      All imaging within the last 24 hours was reviewed.       Discharge Planning   ALLIE: 3/9/2022     Code Status: DNR   Is the  patient medically ready for discharge?: No    Reason for patient still in hospital (select all that apply): Patient trending condition, Laboratory test, Treatment, and Pending disposition  Discharge Plan A: Rehab   Discharge Delays: None known at this time

## 2022-02-28 NOTE — CONSULTS
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 Patient was admitted to the MICU for further management. Wound care consulted for evaluation of skin breakdown.      Scheduled Meds:   amiodarone  200 mg Oral Daily    apixaban  5 mg Oral BID    ascorbic acid (vitamin C)  500 mg Oral BID    aspirin  81 mg Oral Daily    atorvastatin  40 mg Oral Daily    clopidogreL  75 mg Oral Daily    diclofenac sodium  4 g Topical (Top) QID    gabapentin  200 mg Oral TID    insulin aspart U-100  3 Units Subcutaneous TIDWM    insulin detemir U-100  14 Units Subcutaneous QHS    lacosamide  100 mg Oral Q12H    LIDOcaine  1 patch Transdermal Q24H    metoprolol tartrate  25 mg Oral BID    multivitamin  1 tablet Oral Daily    pantoprazole  40 mg Oral BID     Continuous Infusions:  PRN Meds:acetaminophen, albuterol **AND** MDI Q4H, calcium carbonate, dextrose 10%, dextrose 10%, glucagon (human recombinant), glucose, glucose, insulin aspart U-100, melatonin, ondansetron, oxyCODONE, sodium chloride 0.9%    Review of patient's allergies indicates:   Allergen Reactions    Iodine      Other reaction(s): swelling  Other reaction(s): Itching  Other reaction(s): Rash        Past Medical History:   Diagnosis Date    Arthritis     Coronary artery disease     Diabetes mellitus type II     Hyperlipidemia     Hypertension     Kidney stone     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 2012    STEMI involving right coronary artery 2022    Type II or unspecified type diabetes mellitus with neurological manifestations, uncontrolled(250.62) 3/8/2013    Urinary tract infection      Past Surgical History:   Procedure Laterality Date    BACK SURGERY      CATARACT EXTRACTION W/  INTRAOCULAR LENS IMPLANT Right     Per Dr Romero note 2018    COLONOSCOPY N/A 2019    Procedure: COLONOSCOPY Suprep;  Surgeon: Anh Johnson MD;  Location: [facility] ENDO;  Service: Endoscopy;  Laterality: N/A;    EYE SURGERY      HERNIA REPAIR      LEFT HEART CATHETERIZATION Left 2022    Procedure: CATHETERIZATION, HEART, LEFT;  Surgeon: Will Hurst III, MD;  Location: [facility] CATH LAB/EP;  Service: Cardiology;  Laterality: Left;    renal stones      SHOULDER OPEN ROTATOR CUFF REPAIR         Family History    None       Tobacco Use    Smoking status: Former Smoker     Packs/day: 1.50     Years: 25.00     Pack years: 37.50     Quit date: 1983     Years since quittin.1    Smokeless tobacco: Never Used   Substance and Sexual Activity    Alcohol use: No    Drug use: No    Sexual activity: Yes     Partners: Female     Review of Systems   Skin:  Positive for wound.     Objective:     Vital Signs (Most Recent):  Temp: 97.4 °F (36.3 °C) (22 1130)  Pulse: 68 (22 1130)  Resp: 16 (22 1130)  BP: (!) 102/58 (22 1130)  SpO2: 98 % (22 1130)   Vital Signs (24h Range):  Temp:  [96.1 °F (35.6 °C)-97.9 °F (36.6 °C)] 97.4 °F (36.3 °C)  Pulse:  [60-71] 68  Resp:  [16-18] 16  SpO2:  [91 %-98 %] 98 %  BP: (102-141)/(58-74) 102/58     Weight: 76.4 kg (168 lb 6.9 oz)  Body mass index is 21.63 kg/m².  Physical Exam  Constitutional:       Appearance: Normal appearance.   Skin:     General: Skin is warm and dry.      Findings: Lesion present.   Neurological:      Mental Status: He is alert.       Laboratory:  All pertinent labs reviewed within the last  24 hours.    Diagnostic Results:  None        Assessment/Plan:          CONRAD Skin Integrity Evaluation    Skin Integrity CONRAD evaluation of patient as part of the comprehensive skin care team.   He has been admitted for 9 days. Skin injury was noted on 1/26/22. POA yes.              Pressure injury of buttock, unstageable  - pt evaluated for skin breakdown.  - Unstageable pressure injuries noted to left buttock and gluteal cleft.  - continue Triad bid/prn.  - nursing to maintain pressure injury prevention measures.         Thank you for your consult. I will follow-up with patient. Please contact us if you have any additional questions.         Ashleigh Tesfaye NP  Skin Integrity [facility]  [facility] ([facility]

## 2022-02-28 NOTE — PT/OT/SLP PROGRESS
Physical Therapy      Patient Name:  Kamar Muñoz   MRN:  [id number]    Patient not seen today secondary to Patient unwilling to participate. Will follow-up at next scheduled visit per PT POC.

## 2022-03-01 LAB
ACID FAST MOD KINY STN SPEC: NORMAL
ALBUMIN SERPL BCP-MCNC: 1.7 G/DL (ref 3.5–5.2)
ALP SERPL-CCNC: 95 U/L (ref 55–135)
ALT SERPL W/O P-5'-P-CCNC: 8 U/L (ref 10–44)
ANION GAP SERPL CALC-SCNC: 10 MMOL/L (ref 8–16)
AST SERPL-CCNC: 11 U/L (ref 10–40)
BASOPHILS # BLD AUTO: 0.06 K/UL (ref 0–0.2)
BASOPHILS NFR BLD: 0.8 % (ref 0–1.9)
BILIRUB SERPL-MCNC: 0.5 MG/DL (ref 0.1–1)
BUN SERPL-MCNC: 23 MG/DL (ref 8–23)
C DIFF GDH STL QL: NEGATIVE
C DIFF TOX A+B STL QL IA: NEGATIVE
CALCIUM SERPL-MCNC: 7.8 MG/DL (ref 8.7–10.5)
CHLORIDE SERPL-SCNC: 104 MMOL/L (ref 95–110)
CO2 SERPL-SCNC: 22 MMOL/L (ref 23–29)
CREAT SERPL-MCNC: 0.9 MG/DL (ref 0.5–1.4)
D DIMER PPP IA.FEU-MCNC: 0.41 MG/L FEU
DIFFERENTIAL METHOD: ABNORMAL
EOSINOPHIL # BLD AUTO: 0.6 K/UL (ref 0–0.5)
EOSINOPHIL NFR BLD: 8.1 % (ref 0–8)
ERYTHROCYTE [DISTWIDTH] IN BLOOD BY AUTOMATED COUNT: 15.2 % (ref 11.5–14.5)
EST. GFR  (AFRICAN AMERICAN): >60 ML/MIN/1.73 M^2
EST. GFR  (NON AFRICAN AMERICAN): >60 ML/MIN/1.73 M^2
FERRITIN SERPL-MCNC: 274 NG/ML (ref 20–300)
GLUCOSE SERPL-MCNC: 206 MG/DL (ref 70–110)
HCT VFR BLD AUTO: 34.1 % (ref 40–54)
HGB BLD-MCNC: 11.2 G/DL (ref 14–18)
IMM GRANULOCYTES # BLD AUTO: 0.04 K/UL (ref 0–0.04)
IMM GRANULOCYTES NFR BLD AUTO: 0.5 % (ref 0–0.5)
LDH SERPL L TO P-CCNC: 179 U/L (ref 110–260)
LYMPHOCYTES # BLD AUTO: 1.3 K/UL (ref 1–4.8)
LYMPHOCYTES NFR BLD: 16.9 % (ref 18–48)
MAGNESIUM SERPL-MCNC: 1.6 MG/DL (ref 1.6–2.6)
MCH RBC QN AUTO: 28.5 PG (ref 27–31)
MCHC RBC AUTO-ENTMCNC: 32.8 G/DL (ref 32–36)
MCV RBC AUTO: 87 FL (ref 82–98)
MONOCYTES # BLD AUTO: 0.8 K/UL (ref 0.3–1)
MONOCYTES NFR BLD: 10.7 % (ref 4–15)
MYCOBACTERIUM SPEC QL CULT: NORMAL
NEUTROPHILS # BLD AUTO: 4.8 K/UL (ref 1.8–7.7)
NEUTROPHILS NFR BLD: 63 % (ref 38–73)
NRBC BLD-RTO: 0 /100 WBC
PHOSPHATE SERPL-MCNC: 2.8 MG/DL (ref 2.7–4.5)
PLATELET # BLD AUTO: 245 K/UL (ref 150–450)
PMV BLD AUTO: 9.8 FL (ref 9.2–12.9)
POCT GLUCOSE: 114 MG/DL (ref 70–110)
POCT GLUCOSE: 116 MG/DL (ref 70–110)
POCT GLUCOSE: 139 MG/DL (ref 70–110)
POCT GLUCOSE: 155 MG/DL (ref 70–110)
POCT GLUCOSE: 168 MG/DL (ref 70–110)
POTASSIUM SERPL-SCNC: 3.8 MMOL/L (ref 3.5–5.1)
PROT SERPL-MCNC: 5.5 G/DL (ref 6–8.4)
RBC # BLD AUTO: 3.93 M/UL (ref 4.6–6.2)
SODIUM SERPL-SCNC: 136 MMOL/L (ref 136–145)
WBC # BLD AUTO: 7.64 K/UL (ref 3.9–12.7)

## 2022-03-01 PROCEDURE — 85379 FIBRIN DEGRADATION QUANT: CPT | Mod: HCNC | Performed by: STUDENT IN AN ORGANIZED HEALTH CARE EDUCATION/TRAINING PROGRAM

## 2022-03-01 PROCEDURE — 12000002 HC ACUTE/MED SURGE SEMI-PRIVATE ROOM: Mod: HCNC

## 2022-03-01 PROCEDURE — 82728 ASSAY OF FERRITIN: CPT | Mod: HCNC | Performed by: STUDENT IN AN ORGANIZED HEALTH CARE EDUCATION/TRAINING PROGRAM

## 2022-03-01 PROCEDURE — 84100 ASSAY OF PHOSPHORUS: CPT | Mod: HCNC | Performed by: STUDENT IN AN ORGANIZED HEALTH CARE EDUCATION/TRAINING PROGRAM

## 2022-03-01 PROCEDURE — 25000003 PHARM REV CODE 250: Mod: HCNC | Performed by: INTERNAL MEDICINE

## 2022-03-01 PROCEDURE — 99900035 HC TECH TIME PER 15 MIN (STAT): Mod: HCNC

## 2022-03-01 PROCEDURE — 27000207 HC ISOLATION: Mod: HCNC

## 2022-03-01 PROCEDURE — 27000221 HC OXYGEN, UP TO 24 HOURS: Mod: HCNC

## 2022-03-01 PROCEDURE — 99232 SBSQ HOSP IP/OBS MODERATE 35: CPT | Mod: HCNC,95,, | Performed by: INTERNAL MEDICINE

## 2022-03-01 PROCEDURE — 94640 AIRWAY INHALATION TREATMENT: CPT | Mod: HCNC

## 2022-03-01 PROCEDURE — 83735 ASSAY OF MAGNESIUM: CPT | Mod: HCNC | Performed by: STUDENT IN AN ORGANIZED HEALTH CARE EDUCATION/TRAINING PROGRAM

## 2022-03-01 PROCEDURE — 80053 COMPREHEN METABOLIC PANEL: CPT | Mod: HCNC | Performed by: STUDENT IN AN ORGANIZED HEALTH CARE EDUCATION/TRAINING PROGRAM

## 2022-03-01 PROCEDURE — 25000003 PHARM REV CODE 250: Mod: HCNC | Performed by: STUDENT IN AN ORGANIZED HEALTH CARE EDUCATION/TRAINING PROGRAM

## 2022-03-01 PROCEDURE — 85025 COMPLETE CBC W/AUTO DIFF WBC: CPT | Mod: HCNC | Performed by: STUDENT IN AN ORGANIZED HEALTH CARE EDUCATION/TRAINING PROGRAM

## 2022-03-01 PROCEDURE — 36415 COLL VENOUS BLD VENIPUNCTURE: CPT | Mod: HCNC | Performed by: STUDENT IN AN ORGANIZED HEALTH CARE EDUCATION/TRAINING PROGRAM

## 2022-03-01 PROCEDURE — C9399 UNCLASSIFIED DRUGS OR BIOLOG: HCPCS | Mod: HCNC | Performed by: STUDENT IN AN ORGANIZED HEALTH CARE EDUCATION/TRAINING PROGRAM

## 2022-03-01 PROCEDURE — 83615 LACTATE (LD) (LDH) ENZYME: CPT | Mod: HCNC | Performed by: STUDENT IN AN ORGANIZED HEALTH CARE EDUCATION/TRAINING PROGRAM

## 2022-03-01 PROCEDURE — 99232 PR SUBSEQUENT HOSPITAL CARE,LEVL II: ICD-10-PCS | Mod: HCNC,95,, | Performed by: INTERNAL MEDICINE

## 2022-03-01 PROCEDURE — 99222 1ST HOSP IP/OBS MODERATE 55: CPT | Mod: HCNC,,, | Performed by: NURSE PRACTITIONER

## 2022-03-01 PROCEDURE — 99222 PR INITIAL HOSPITAL CARE,LEVL II: ICD-10-PCS | Mod: HCNC,,, | Performed by: NURSE PRACTITIONER

## 2022-03-01 RX ORDER — LOPERAMIDE HYDROCHLORIDE 2 MG/1
2 CAPSULE ORAL 3 TIMES DAILY PRN
Status: DISCONTINUED | OUTPATIENT
Start: 2022-03-01 | End: 2022-03-10 | Stop reason: HOSPADM

## 2022-03-01 RX ORDER — INSULIN ASPART 100 [IU]/ML
4 INJECTION, SOLUTION INTRAVENOUS; SUBCUTANEOUS
Status: DISCONTINUED | OUTPATIENT
Start: 2022-03-01 | End: 2022-03-01

## 2022-03-01 RX ORDER — INSULIN ASPART 100 [IU]/ML
5 INJECTION, SOLUTION INTRAVENOUS; SUBCUTANEOUS
Status: DISCONTINUED | OUTPATIENT
Start: 2022-03-01 | End: 2022-03-05

## 2022-03-01 RX ADMIN — INSULIN DETEMIR 14 UNITS: 100 INJECTION, SOLUTION SUBCUTANEOUS at 10:03

## 2022-03-01 RX ADMIN — DICLOFENAC SODIUM 4 G: 10 GEL TOPICAL at 09:03

## 2022-03-01 RX ADMIN — GABAPENTIN 200 MG: 100 CAPSULE ORAL at 09:03

## 2022-03-01 RX ADMIN — Medication 6 MG: at 09:03

## 2022-03-01 RX ADMIN — METOPROLOL TARTRATE 25 MG: 25 TABLET, FILM COATED ORAL at 09:03

## 2022-03-01 RX ADMIN — APIXABAN 5 MG: 5 TABLET, FILM COATED ORAL at 09:03

## 2022-03-01 RX ADMIN — PANTOPRAZOLE SODIUM 40 MG: 40 TABLET, DELAYED RELEASE ORAL at 09:03

## 2022-03-01 RX ADMIN — INSULIN ASPART 2 UNITS: 100 INJECTION, SOLUTION INTRAVENOUS; SUBCUTANEOUS at 05:03

## 2022-03-01 RX ADMIN — OXYCODONE HYDROCHLORIDE AND ACETAMINOPHEN 500 MG: 500 TABLET ORAL at 09:03

## 2022-03-01 RX ADMIN — ASPIRIN 81 MG: 81 TABLET, COATED ORAL at 09:03

## 2022-03-01 RX ADMIN — LACOSAMIDE 100 MG: 100 TABLET, FILM COATED ORAL at 09:03

## 2022-03-01 RX ADMIN — CLOPIDOGREL 75 MG: 75 TABLET, FILM COATED ORAL at 09:03

## 2022-03-01 RX ADMIN — ACETAMINOPHEN 650 MG: 325 TABLET ORAL at 03:03

## 2022-03-01 RX ADMIN — MULTIPLE VITAMINS W/ MINERALS TAB 1 TABLET: TAB at 09:03

## 2022-03-01 RX ADMIN — ATORVASTATIN CALCIUM 40 MG: 20 TABLET, FILM COATED ORAL at 09:03

## 2022-03-01 RX ADMIN — CALCIUM CARBONATE (ANTACID) CHEW TAB 500 MG 500 MG: 500 CHEW TAB at 09:03

## 2022-03-01 RX ADMIN — CALCIUM CARBONATE (ANTACID) CHEW TAB 500 MG 500 MG: 500 CHEW TAB at 04:03

## 2022-03-01 RX ADMIN — ACETAMINOPHEN 650 MG: 325 TABLET ORAL at 09:03

## 2022-03-01 RX ADMIN — DICLOFENAC SODIUM 4 G: 10 GEL TOPICAL at 12:03

## 2022-03-01 RX ADMIN — AMIODARONE HYDROCHLORIDE 200 MG: 200 TABLET ORAL at 09:03

## 2022-03-01 RX ADMIN — GABAPENTIN 200 MG: 100 CAPSULE ORAL at 03:03

## 2022-03-01 RX ADMIN — INSULIN ASPART 5 UNITS: 100 INJECTION, SOLUTION INTRAVENOUS; SUBCUTANEOUS at 05:03

## 2022-03-01 RX ADMIN — INSULIN ASPART 3 UNITS: 100 INJECTION, SOLUTION INTRAVENOUS; SUBCUTANEOUS at 09:03

## 2022-03-01 RX ADMIN — INSULIN ASPART 2 UNITS: 100 INJECTION, SOLUTION INTRAVENOUS; SUBCUTANEOUS at 12:03

## 2022-03-01 RX ADMIN — DICLOFENAC SODIUM 4 G: 10 GEL TOPICAL at 04:03

## 2022-03-01 RX ADMIN — ALBUTEROL SULFATE 2 PUFF: 90 AEROSOL, METERED RESPIRATORY (INHALATION) at 10:03

## 2022-03-01 RX ADMIN — INSULIN ASPART 5 UNITS: 100 INJECTION, SOLUTION INTRAVENOUS; SUBCUTANEOUS at 12:03

## 2022-03-01 NOTE — ASSESSMENT & PLAN NOTE
Hx of CAD s/p placement of 2 LAUREN in RCA in 01/09/2022.   - Continue home ASA, plavix and statin

## 2022-03-01 NOTE — ASSESSMENT & PLAN NOTE
In setting of COVID infection. Resolved upon step-down from MICU. ORA.   maintain SpO2 88-92% given hx of COPD  -wean oxygen as tolerated

## 2022-03-01 NOTE — CONSULTS
[facility] ([place] [id number])  Endocrinology  Diabetes Consult Note    Consult Requested by: Komal Huynh MD   Reason for admit: Encephalopathy, metabolic    HISTORY OF PRESENT ILLNESS:  Reason for Consult: Management of T2DM, Hyperglycemia     Diabetes diagnosis year: 2010    Home Diabetes Medications:  Novolog 9 TIDWM + SSI; Levemir 20 units qd; Metformin 1000 mg BID    How often checking glucose at home? 1-3 x day   BG readings on regimen: 100-150's  Hypoglycemia on the regimen?  No  Missed doses on regimen?  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 History was not obtained from patient due to encephalopathy. Patient's daughter at bedside reports the patient was recently in the ED on 2/17 for a mechanical fall after being discharged from rehab the same day. CTH and cervical spine revealed  evolving late subacute infarct involving the right frontal lobe with questionable petechial hemorrhage. Vascular neurology evaluated the patient and cleared him for discharge from without any new interventions. He was also tested positive for COVID-19 and was discharged yesterday from the ED. He subsequently received one dose of sotrovimab infusion for COVID and was in a normal state of health until this morning when his daughter found him lethargic and barely responsive prompting her to bring him to the ED. She reports the patient had no complaints prior to developing his AMS. Of note, the patient was recently admitted to [facility] [place] [facility] from 01/25 through 02/13 for AMS concerning for CVA, however he was treated for metabolic  encephalopathy 2/2 to DKA/HHS, sepsis and BRENDAN. Upon arrival to the ED, he was placed on 6L NC, normotensive and tachycardic. Lactic 11.5, WBC 17.8, Glucose 1109, pH 7.17, Co2 15.6 HCO3 <5, AG unable to calculate. sCr 3.1. CXR with intersitial opacities. He received ~4L of IVF, vancomycin, zosyn, initiated on insulin shifted for hyperkalemia and calcium for cardioprotection. ECG without noticeable ischemic changes. CTH without new changes- discussed findings with radiology. Patient was admitted to the MICU for further management. Endocrine consulted to manage type 2 diabetes and hyperglycemia. Since admission patient has had fluctuations in BG control.             Interval HPI:   Overnight events: No acute events overnight. Patient on the IMTA unit in room 49831/[address] A. Blood glucose improving. BG at and above goal on current insulin regimen (SSI, prandial, and basal insulin ). Steroid use- None.    Renal function- Normal   Vasopressors-  None       Diet diabetic [facility];  Calorie     Eatin%  Nausea: No  Hypoglycemia and intervention: No  Fever: No  TPN and/or TF: No    PMH, PSH, FH, SH updated and reviewed     ROS:    Review of Systems  Constitutional: Negative for weight changes.  Eyes: Negative for visual disturbance.  Respiratory: Negative for cough.   Cardiovascular: Negative for chest pain.  Gastrointestinal: Negative for nausea.  Endocrine: Negative for polyuria, polydipsia.  Musculoskeletal: Negative for back pain.  Skin: Negative for rash.  Neurological: Negative for syncope.  Psychiatric/Behavioral: Negative for depression.      Current Medications and/or Treatments Impacting Glycemic Control  Immunotherapy:    Immunosuppressants       None          Steroids:   Hormones (From admission, onward)                Start     Stop Route Frequency Ordered    22 1824  melatonin tablet 6 mg         -- Oral Nightly PRN 22 1724          Pressors:    Autonomic Drugs (From admission,  onward)                None          Hyperglycemia/Diabetes Medications:   Antihyperglycemics (From admission, onward)                Start     Stop Route Frequency Ordered    03/01/22 1130  insulin aspart U-100 pen 5 Units         -- SubQ 3 times daily with meals 03/01/22 1019    02/27/22 2100  insulin detemir U-100 pen 14 Units         -- SubQ Nightly 02/27/22 1734    02/20/22 2226  insulin aspart U-100 pen 1-10 Units         -- SubQ Before meals & nightly PRN 02/20/22 2127             PHYSICAL EXAMINATION:  Vitals:    03/01/22 1539   BP:    Pulse: 68   Resp:    Temp:      Body mass index is 21.63 kg/m².    Physical Exam  Physical exam deferred due to COVID-19.      Labs Reviewed and Include   Recent Labs   Lab 03/01/22  0257   *   CALCIUM 7.8*   ALBUMIN 1.7*   PROT 5.5*      K 3.8   CO2 22*      BUN 23   CREATININE 0.9   ALKPHOS 95   ALT 8*   AST 11   BILITOT 0.5     Lab Results   Component Value Date    WBC 7.64 03/01/2022    HGB 11.2 (L) 03/01/2022    HCT 34.1 (L) 03/01/2022    MCV 87 03/01/2022     03/01/2022     No results for input(s): TSH, FREET4 in the last 168 hours.  Lab Results   Component Value Date    HGBA1C 11.5 (H) 01/26/2022       Nutritional status:   Body mass index is 21.63 kg/m².  Lab Results   Component Value Date    ALBUMIN 1.7 (L) 03/01/2022    ALBUMIN 1.8 (L) 02/28/2022    ALBUMIN 1.8 (L) 02/27/2022     No results found for: PREALBUMIN    Estimated Creatinine Clearance: 73.1 mL/min (based on SCr of 0.9 mg/dL).    Accu-Checks  Recent Labs     02/27/22  1138 02/27/22  1711 02/27/22  2036 02/28/22  0803 02/28/22  1059 02/28/22  1612 02/28/22  2122 03/01/22  0732 03/01/22  1211 03/01/22  1531   POCTGLUCOSE 277* 181* 189* 309* 294* 245* 321* 139* 155* 168*        ASSESSMENT and PLAN    * Encephalopathy, metabolic    Managed per primary team  Avoid hypoglycemia      Type 2 diabetes mellitus with hyperglycemia, with long-term current use of insulin  Endocrinology  consulted for BG management.   BG goal 140-180    30% reduction in home dosing.     - Levemir Flex Pen 14 units nightly   - Novolog (aspart) insulin 5 Units SQ TIDWM and prn for BG excursions MDC SSI (150/25). (Increased by 20% due to prandial BG excursions)  - BG checks AC/HS      ** Please notify Endocrine for any change and/or advance in diet**  ** Please call Endocrine for any BG related issues **    Discharge Planning:   TBD. Please notify endocrinology prior to discharge.        Diabetic ketoacidosis with coma associated with type 2 diabetes mellitus  Now resolved.     Will continue to monitor in the event of BG excursions.       COVID-19  Infection may elevate BG readings  Managed per primary team              Plan discussed with patient, family, and RN at bedside.        Cresencio Duke, DNP, FNP  Endocrinology  [facility] ([facility]

## 2022-03-01 NOTE — ASSESSMENT & PLAN NOTE
Endocrinology consulted for BG management.   BG goal 140-180    30% reduction in home dosing.     - Levemir Flex Pen 14 units nightly   - Novolog (aspart) insulin 5 Units SQ TIDWM and prn for BG excursions MDC SSI (150/25). (Increased by 20% due to prandial BG excursions)  - BG checks AC/HS      ** Please notify Endocrine for any change and/or advance in diet**  ** Please call Endocrine for any BG related issues **    Discharge Planning:   TBD. Please notify endocrinology prior to discharge.

## 2022-03-01 NOTE — PROGRESS NOTES
[facility] - [facility]ensive [facility] ([place] [id number])  [facility] Medicine  Telemedicine Progress Note    Patient Name: Kamar Muñoz  MRN: [id number]  Patient Class: IP- Inpatient   Admission Date: 2/19/2022  Length of Stay: 9 days  Attending Physician: Komal Huynh MD  Primary Care Provider: Basim Guerrero MD          Subjective:     Principal Problem:Encephalopathy, metabolic        HPI:  Kamar Muñoz is a 76 year old male with past medical history of CAD s/p 2 LAUREN in RCA (Jan 2022), HTN, HLD, p. A. 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 History was not obtained from patient due to encephalopathy. Patient's daughter at bedside reports the patient was recently in the ED on 2/17 for a mechanical fall after being discharged from rehab the same day. CTH and cervical spine revealed  evolving late subacute infarct involving the right frontal lobe with questionable petechial hemorrhage. Vascular neurology evaluated the patient and cleared him for discharge from without any new interventions. He was also tested positive for COVID-19 and was discharged yesterday from the ED. He subsequently received one dose of sotrovimab infusion for COVID and was in a normal state of health until this morning when his daughter found him lethargic and barely responsive prompting her to bring him to the ED. She reports the patient had no complaints prior to developing his AMS. Of note, the patient was [facility]/13 for AMS concerning for CVA, however  he was treated for metabolic encephalopathy 2/2 to DKA/HHS, sepsis and BRENDAN.      Upon arrival to the ED, he was placed on 6L NC, normotensive and tachycardic. Lactic 11.5, WBC 17.8, Glucose 1109, pH 7.17, Co2 15.6 HCO3 <5, AG unable to calculate. sCr 3.1. CXR with intersitial opacities. He received ~4L of IVF, vancomycin, zosyn, initiated on insulin shifted for hyperkalemia and calcium for cardioprotection. ECG without noticeable ischemic changes. CTH without new changes- discussed findings with radiology. Patient was admitted to the MICU for further management.        Overview/Hospital Course:  ICU Course:  Placed on remdesivir and broad spectrum IV abx in addition to insulin per DKA protocol. In MICU: Weaned supp O2 to room air; Transitioned to subq insulin; Improvement of AMS. BRENDAN improving gradually. Pt had discussion w/ family in MICU and transitioned from full code to DNR/DNI.  Step down to  initiated 2/21.    Hospital Medicine Course:  Pt w/ episode of CP and hypoglycemia upon stepdown. Ischemic work-up largely unremarkable with trop down trending from admission. Detemir dosing adjusted from BID to qhs. He had additional hypoglycemic episode upon re-uptitration of long-acting insulin from 12 to 15. Dosing decreased to 13u as patient was hyperglycemic to 230s w/12u qhs. Worsening hyperglycemia to 270s- insulin dose modified to 3u TID wm, and detemir 14u qhs.    Episode of abdominal pain and diarrhea 02/25- appeared to be 2/2 antibiotic use. Antibiotics stopped 02/25. Pt w/persistent diarrhea- c diff studies sent- loperamide stopped.     Pt was found to have St 3 sacral pressure ulcer.         This encounter was provided through telemedicine.  Patient was transferred to the telemedicine service on:  02/28/2022   The patient location is: [id number] A admitted 2/19/2022  6:57 PM.  Present with the patient at the time of the telemed/virtual assessment: Family member    Interval History/Overnight Events:    Clinical record since admit reviewed.    Patient only complains of diarrhea which occurred this am ; no dyspnea or cough but remains on oxygen at 2L NC; no abd pain; relying on glucerna and not eating much of provided diet; his son will try to call and order food from cafeteria to see is this encourages more intake. No hx of lactose intolerance and no laxatives currently.  Patient's wife is also admitted. Patient states he will participate with therapy.  Also encouraged to turn with nursing; pain to buttocks and feet (pressed against bed and nurisng to investigate extension for bed). C. diff pending.    Review of Systems   Constitutional:  Positive for activity change, appetite change and fatigue. Negative for fever.   Respiratory:  Negative for cough and shortness of breath.    Gastrointestinal:  Positive for diarrhea. Negative for vomiting.   Musculoskeletal:  Positive for arthralgias, back pain and myalgias.      Inpatient Medications:  Scheduled Meds:   amiodarone  200 mg Oral Daily    apixaban  5 mg Oral BID    ascorbic acid (vitamin C)  500 mg Oral BID    aspirin  81 mg Oral Daily    atorvastatin  40 mg Oral Daily    clopidogreL  75 mg Oral Daily    diclofenac sodium  4 g Topical (Top) QID    gabapentin  200 mg Oral TID    insulin aspart U-100  3 Units Subcutaneous TIDWM    insulin detemir U-100  14 Units Subcutaneous QHS    lacosamide  100 mg Oral Q12H    LIDOcaine  1 patch Transdermal Q24H    metoprolol tartrate  25 mg Oral BID    multivitamin  1 tablet Oral Daily    pantoprazole  40 mg Oral BID     Continuous Infusions:  PRN Meds:.acetaminophen, albuterol **AND** MDI Q4H, calcium carbonate, dextrose 10%, dextrose 10%, glucagon (human recombinant), glucose, glucose, insulin aspart U-100, melatonin, ondansetron, oxyCODONE, sodium chloride 0.9%      Objective:     Temp:  [96.1 °F (35.6 °C)-98.3 °F (36.8 °C)] 98.3 °F (36.8 °C)  Pulse:  [60-71] 65  Resp:  [16-18] 16  SpO2:  [91 %-98 %] 96 %  BP:  (102141)/(57-74) 113/57      Intake/Output Summary (Last 24 hours) at 2/28/2022 1622  Last data filed at 2/28/2022 1200  Gross per 24 hour   Intake 1200 ml   Output 400 ml   Net 800 ml        Body mass index is 21.63 kg/m².    Physical Exam  Vitals and nursing note reviewed.   Constitutional:       General: He is not in acute distress.     Appearance: He is normal weight. He is ill-appearing.   HENT:      Head: Normocephalic and atraumatic.      Right Ear: Hearing normal.      Left Ear: Hearing normal.      Nose: Nose normal.   Eyes:      General: No scleral icterus.        Right eye: No discharge.         Left eye: No discharge.      Extraocular Movements: Extraocular movements intact.   Cardiovascular:      Rate and Rhythm: Normal rate.   Pulmonary:      Effort: Pulmonary effort is normal. No accessory muscle usage or respiratory distress.   Skin:     Findings: No rash.   Neurological:      General: No focal deficit present.      Mental Status: He is alert. Mental status is at baseline.      Cranial Nerves: No cranial nerve deficit.      Motor: No weakness.   Psychiatric:         Attention and Perception: Attention normal.         Mood and Affect: Mood normal.         Speech: Speech normal.         Behavior: Behavior is cooperative.        Labs:  Recent Results (from the past 24 hour(s))   POCT glucose    Collection Time: 02/27/22  5:11 PM   Result Value Ref Range    POCT Glucose 181 (H) 70 - 110 mg/dL   POCT glucose    Collection Time: 02/27/22  8:36 PM   Result Value Ref Range    POCT Glucose 189 (H) 70 - 110 mg/dL   Lactate Dehydrogenase    Collection Time: 02/27/22  9:30 PM   Result Value Ref Range     110 - 260 U/L   Ferritin    Collection Time: 02/27/22  9:30 PM   Result Value Ref Range    Ferritin 312 (H) 20.0 - 300.0 ng/mL   D-Dimer, Quantitative    Collection Time: 02/27/22  9:30 PM   Result Value Ref Range    D-Dimer 0.37 <0.50 mg/L FEU   Comprehensive metabolic panel    Collection Time: 02/28/22   6:06 AM   Result Value Ref Range    Sodium 136 136 - 145 mmol/L    Potassium 4.8 3.5 - 5.1 mmol/L    Chloride 103 95 - 110 mmol/L    CO2 20 (L) 23 - 29 mmol/L    Glucose 337 (H) 70 - 110 mg/dL    BUN 22 8 - 23 mg/dL    Creatinine 1.0 0.5 - 1.4 mg/dL    Calcium 8.0 (L) 8.7 - 10.5 mg/dL    Total Protein 6.0 6.0 - 8.4 g/dL    Albumin 1.8 (L) 3.5 - 5.2 g/dL    Total Bilirubin 0.6 0.1 - 1.0 mg/dL    Alkaline Phosphatase 109 55 - 135 U/L    AST 20 10 - 40 U/L    ALT 9 (L) 10 - 44 U/L    Anion Gap 13 8 - 16 mmol/L    eGFR if African American >60.0 >60 mL/min/1.73 m^2    eGFR if non African American >60.0 >60 mL/min/1.73 m^2   Magnesium    Collection Time: 02/28/22  6:06 AM   Result Value Ref Range    Magnesium 1.6 1.6 - 2.6 mg/dL   CBC auto differential    Collection Time: 02/28/22  6:06 AM   Result Value Ref Range    WBC 9.33 3.90 - 12.70 K/uL    RBC 4.29 (L) 4.60 - 6.20 M/uL    Hemoglobin 11.9 (L) 14.0 - 18.0 g/dL    Hematocrit 36.8 (L) 40.0 - 54.0 %    MCV 86 82 - 98 fL    MCH 27.7 27.0 - 31.0 pg    MCHC 32.3 32.0 - 36.0 g/dL    RDW 14.9 (H) 11.5 - 14.5 %    Platelets 256 150 - 450 K/uL    MPV 10.0 9.2 - 12.9 fL    Immature Granulocytes 0.9 (H) 0.0 - 0.5 %    Gran # (ANC) 6.4 1.8 - 7.7 K/uL    Immature Grans (Abs) 0.08 (H) 0.00 - 0.04 K/uL    Lymph # 1.1 1.0 - 4.8 K/uL    Mono # 0.9 0.3 - 1.0 K/uL    Eos # 0.8 (H) 0.0 - 0.5 K/uL    Baso # 0.10 0.00 - 0.20 K/uL    nRBC 0 0 /100 WBC    Gran % 68.1 38.0 - 73.0 %    Lymph % 11.8 (L) 18.0 - 48.0 %    Mono % 9.2 4.0 - 15.0 %    Eosinophil % 8.9 (H) 0.0 - 8.0 %    Basophil % 1.1 0.0 - 1.9 %    Differential Method Automated    Phosphorus    Collection Time: 02/28/22  6:06 AM   Result Value Ref Range    Phosphorus 2.8 2.7 - 4.5 mg/dL   POCT glucose    Collection Time: 02/28/22  8:03 AM   Result Value Ref Range    POCT Glucose 309 (H) 70 - 110 mg/dL   POCT Glucose, Hand-Held Device    Collection Time: 02/28/22  8:04 AM   Result Value Ref Range    POC Glucose 309 (A) 70 - 110  MG/DL   POCT glucose    Collection Time: 02/28/22 10:59 AM   Result Value Ref Range    POCT Glucose 294 (H) 70 - 110 mg/dL   POCT Glucose, Hand-Held Device    Collection Time: 02/28/22 11:05 AM   Result Value Ref Range    POC Glucose 294 (A) 70 - 110 MG/DL        Lab Results   Component Value Date    [id number] Positive (A) 02/17/2022       Recent Labs   Lab 02/26/22 0414 02/27/22 0228 02/28/22  0606   WBC 11.30 8.72 9.33   LYMPH 14.6*  1.7 15.4*  1.3 11.8*  1.1   HGB 11.7* 11.5* 11.9*   HCT 36.5* 35.8* 36.8*    235 256     Recent Labs   Lab 02/26/22 0413 02/26/22 0414 02/27/22 0228 02/28/22  0606   NA  --  141 137 136   K  --  3.6 4.3 4.8   CL  --  106 103 103   CO2  --  27 22* 20*   BUN  --  16 17 22   CREATININE  --  1.0 0.9 1.0   GLU  --  56* 277* 337*   CALCIUM  --  8.2* 8.0* 8.0*   MG  --  1.6 1.6 1.6   PHOS 2.3*  --  3.0 2.8     Recent Labs   Lab 02/26/22 0414 02/27/22 0228 02/28/22  0606   ALKPHOS 109 113 109   ALT 10 11 9*   AST 12 18 20   ALBUMIN 1.8* 1.8* 1.8*   PROT 5.8* 5.8* 6.0   BILITOT 0.5 0.6 0.6        Recent Labs     02/25/22  2139 02/27/22  2130   DDIMER 0.47 0.37   FERRITIN 337* 312*   * 217       All labs within the last 24 hours were reviewed.     Microbiology:  Microbiology Results (last 7 days)       Procedure Component Value Units Date/Time    Clostridium difficile EIA [[id number]] Collected: 02/28/22 1054    Order Status: Sent Specimen: Stool Updated: 02/28/22 1201    Blood culture x two cultures. Draw prior to antibiotics. [[id number]] Collected: 02/19/22 1932    Order Status: Completed Specimen: Blood from Peripheral, Hand, Left Updated: 02/24/22 2212     Blood Culture, Routine No growth after 5 days.    Narrative:      Aerobic and anaerobic    Blood culture x two cultures. Draw prior to antibiotics. [[id number]] Collected: 02/19/22 1932    Order Status: Completed Specimen: Blood from Peripheral, Hand, Left Updated: 02/24/22 2212     Blood Culture, Routine No  growth after 5 days.    Narrative:      Aerobic and anaerobic              Imaging  ECG Results              EKG 12-lead (Final result)  Result time 02/20/22 09:32:21      Final result by Interface, Lab In [facility] (02/20/22 09:32:21)                   Narrative:    Test Reason :     Vent. Rate : 126 BPM     Atrial Rate : 120 BPM     P-R Int : 000 ms          QRS Dur : 162 ms      QT Int : 412 ms       P-R-T Axes : 000 059 -31 degrees     QTc Int : 596 ms    Wide QRS tachycardia  Right bundle branch block  T wave abnormality, consider inferior ischemia  Abnormal ECG  When compared with ECG of 19-FEB-2022 19:09,  Wide QRS tachycardia has replaced Junctional rhythm or SVT  Confirmed by Lencho BARROS MD ([id number]) on 2/20/2022 9:32:14 AM    Referred By: [email or web address]   SELF           Confirmed By:Lencho BARROS MD                                     Repeat EKG 12-lead (Final result)  Result time 02/20/22 09:34:32      Final result by Interface, Lab In [facility] (02/20/22 09:34:32)                   Narrative:    Test Reason :     Vent. Rate : 118 BPM     Atrial Rate : 117 BPM     P-R Int : 000 ms          QRS Dur : 118 ms      QT Int : 374 ms       P-R-T Axes : 000 056 -21 degrees     QTc Int : 524 ms    Accelerated Junctional rhythm vs SVT with artifact  Nonspecific intraventricular conduction delay  ST and T wave abnormality, consider inferior ischemia  Prolonged QT  Abnormal ECG  When compared with ECG of 27-JAN-2022 00:52,  Rhythm changed  Vent. rate has increased BY  50 BPM  Questionable change in QRS duration  Confirmed by Lencho BARROS MD ([id number]) on 2/20/2022 9:34:22 AM    Referred By: [email or web address]   SELF           Confirmed By:Lencho BARROS MD                                    Results for orders placed during the hospital encounter of 01/25/22    Echo    Interpretation Summary  · There is abnormal septal wall motion.  · The left ventricle is normal in size with low normal systolic function.  · The estimated ejection fraction is 50%.  ·  Normal left ventricular diastolic function.  · Mild left atrial enlargement.  · Normal right ventricular size with normal right ventricular systolic function.  · Mild mitral regurgitation.  · There are segmental left ventricular wall motion abnormalities.  · Mild tricuspid regurgitation.  · Elevated central venous pressure (15 mmHg).      X-Ray Chest AP Portable  Narrative: EXAMINATION:  XR CHEST AP PORTABLE    CLINICAL HISTORY:  Sob, cough;    TECHNIQUE:  Single frontal view of the chest was performed.    COMPARISON:  Chest radiograph February 19, 2022    FINDINGS:  AP portable chest radiographic examination is submitted.  When accounting for difference in position and technique the appearance of the cardiomediastinal silhouette is thought stable.    There appears to be mild prominence of the central pulmonary vascular, there is bilateral pattern of pulmonary opacity consistent with interstitial and ground-glass and patchy alveolar infiltrates, this appears superimposed on chronic change.  There is no evidence for large pleural effusion there is minimal blunting at the right costophrenic angle that may relate to a small amount of pleural fluid.  There is no evidence for pneumothorax.  The osseous structures demonstrate chronic change.  Impression: Mild prominence of the central pulmonary vascular, there is appearance of bilateral opacity consistent with interstitial and ground-glass and patchy alveolar infiltrates.    Electronically signed by: Frantz Lopez  Date:    02/25/2022  Time:    22:03      All imaging within the last 24 hours was reviewed.       Discharge Planning   ALLIE: 3/9/2022     Code Status: DNR   Is the patient medically ready for discharge?: No    Reason for patient still in hospital (select all that apply): Patient trending condition, Laboratory test, Treatment, and Pending disposition  Discharge Plan A: Rehab   Discharge Delays: None known at this time        Assessment/Plan:      *  Encephalopathy, metabolic  In the setting of DKA and sepsis upon admission.  Admit CT Head without any concerning new findings- no new infarct as discussed with radiology  Resolved.      Pressure injury of buttock, unstageable  Seen by wound care with triad started      Severe sepsis  Upon admission:  Organ dysfunction indicated by Acute kidney injury, Encephalopathy  and Acute respiratory failure  Source- Unclear. CXR with diffuse interstitial opacities, however no consolidation noted. UA with pyuria, without elevated leuks. Blood cultures pending. Possibly due to sacral wound, although doesn't look grossly infected.     Started on broad spectrum abx at admission with vanc/zosyn/doxy.    - vancomycin discontinued today as no MRSA has isolated.  No infectious source found but leukocytosis persists.  Consider de-escalation further based on continued improvement.  Day 3 abx today.    F/u Urine and blood cultures  - Wound care consulted for sacral decubitus wound. Appreciate recs.  -lactic acid overall trend improved    Acute hypoxemic respiratory failure  In setting of COVID infection. Resolved upon step-down from MICU. ORA.   maintain SpO2 88-92% given hx of COPD  -wean oxygen as tolerated      Palliative care status  Per MICU: patient wishes to be DNR/DNI and family agrees. Still want to continue full medical care     COVID-19  Viral Pneumonia due to COVID-19  - COVID-19 testing:   -Patient is identified as High risk for severe complications of COVID 19 based on COVID risk score of 8   Initiate standard COVID protocols; COVID-19 testing Collection Date: 8/9/2021 Collection Time:   3:41 PM ,Infection Control notification  and isolation- respiratory, contact and droplet per protocol  COVID vaccinated with booster.   Received 1 dose of sotrovimab infusion 02/18/2022  - Diagnostics: Trend LDH, CRP, Ferritin, D-dimer Q48hrs if stable, more frequently if patient decompensating.     Lymphopenia, hyponatremia,  hyperferritinemia, elevated troponin, elevated d-dimer, age, and medical comorbidities are significant predictors of poor clinical outcome     - Management: Per [facility] COVID Treatment Protocol (4/15/20)       - Monitoring:          - Telemetry & Continuous Pulse Oximetry       - Targeted therapy Remdesivir, 200mg IV x1, followed by 100mg IV daily x5 days total,  - Holding dexamethasone PO/IV 6mg daily x10 days in the setting of DKA/HHS.-  -Weaning 02 as tolerated  - Anticoagulation, Patient admitted to critical care up initial presentation and on triple therapy- Will continue home apixaban dose anticoagulation      - Antibiotics:  - if indications, CXR findings, elevated procal. See protocol for alternatives.   - Discontinue early if low concern for bacterial co-infection          - Initiated on IV vanc, zosyn and doxycycline (unable to use azithromycin due to prolong QTc)     - Nutrition:           - Multivitamin PO daily          - Add Boost supplement          - Vitamin D 1000IU daily if deficient          - Ascorbic acid 500mg PO bid    - Supportive Care:          - acetaminophen 650mg PO Q6hr PRN fever/headache          - loperamide PRN viral diarrhea    Diabetic ketoacidosis with coma associated with type 2 diabetes mellitus  Patient with uncontrolled DM presenting with metabolic encephalopathy in the setting of DKA with coma. Suspect patient developed DKA in the setting of sepsis/ COVID-10   Glucose 1109, pH 7.17, Co2 15.6 HCO3 <5, AG unable to calculate  DKA protocol completed and DKA resolved in MICU and Pt stepped down on subq insulin.  Hypoglycemia episode upon step-down, detemir adjusted from BID to qhs per home long acting insulin and due to episode of hypoglycemia   Continue aspart TIDWM  Diabetic diet  POCT BGx4  Cotinue to titrate short and long acting insulin needs as indicated to in-pt hospital goal 140-180    Focal seizures  Continue home lancosamide     BRENDAN (acute kidney injury)  Estimated  Creatinine Clearance: 65.8 mL/min (based on SCr of 1 mg/dL).  sCr maxed at 3.1, baseline 1.1-1.3. Likely prerenal in the setting of DKA  Improved with treatment of DKA  - Avoid nephrotoxins, renally dose meds    Paroxysmal atrial fibrillation  History of paroxysmal atrial fibrillation on home  Metoprolol XL 50 mg daily, diltiazem  mg daily and amiodarone 200 mg daily.  - Continue po apixaban  - Continue amiodarone  - metoprolol and diltiazem held in MICU  in the setting of sepsis.   -resumed BB and CBB with titration as tolerated per BP          Embolic stroke involving right middle cerebral artery  Hx of CVA in Jan 2022. CT Head with evolving infarcts in right frontal and left cerebellar hemispheres. No concern for acute stroke this admit per discussion with radiology.   - Continue home antiplatelets, statin and eliquis       Coronary artery disease involving native coronary artery of native heart with unstable angina pectoris  Hx of CAD s/p placement of 2 LAUREN in RCA in 01/09/2022.   - Continue home ASA, plavix and statin    Pulmonary nodules  Has stable bilateral  pulmonary masses which could be malignancy per outpatient pulmonology notes. No pathology report as none of the nodules appeared to be safe for biopsy given high risk of PTX is high with many adjacent bulla.         Chronic pulmonary heart disease  Follows up with Pulm clinic in [place]. Last seen in December and was evaluated for pulmonary masses. Deemed to have mild COPD per notes. Not on any maintenance therapy.  - Continue albuterol inhaler     Type 2 diabetes mellitus with hyperglycemia, with long-term current use of insulin  See DKA  Glucoses remain erratic; Endocrine consulted    Hypertension associated with diabetes  resume home antihypertensives as tolerated  Held losartan in setting of BRENDAN  See pAF      VTE Risk Mitigation (From admission, onward)         Ordered     apixaban tablet 5 mg  2 times daily         02/19/22 2202     IP VTE HIGH  RISK PATIENT  Once         02/19/22 2144     Place sequential compression device  Until discontinued         02/19/22 2144                I have assessed these findings virtually using a telemed platform and with assistance of the bedside nurse.          The attending portion of this evaluation, treatment, and documentation was performed per Komal Huynh MD via Telemedicine AudioVisual using the secure [employer] software platform with 2 way audio/video. The provider was located off-site and the patient is located in the hospital. The aforementioned video software was utilized to document the relevant history and physical exam   Note:  Secure [email or web address] software used instead of [employer] for this encounter.      Komal Huynh MD  Department of Hospital Medicine   [facility] - Intensive Care ([place]-[facility])

## 2022-03-01 NOTE — ASSESSMENT & PLAN NOTE
Estimated Creatinine Clearance: 65.8 mL/min (based on SCr of 1 mg/dL).  sCr maxed at 3.1, baseline 1.1-1.3. Likely prerenal in the setting of DKA  Improved with treatment of DKA  - Avoid nephrotoxins, renally dose meds

## 2022-03-01 NOTE — ASSESSMENT & PLAN NOTE
Follows up with Pulm clinic in [place]. Last seen in December and was evaluated for pulmonary masses. Deemed to have mild COPD per notes. Not on any maintenance therapy.  - Continue albuterol inhaler

## 2022-03-01 NOTE — ASSESSMENT & PLAN NOTE
Per MICU: patient wishes to be DNR/DNI and family agrees. Still want to continue full medical care

## 2022-03-01 NOTE — ASSESSMENT & PLAN NOTE
History of paroxysmal atrial fibrillation on home  Metoprolol XL 50 mg daily, diltiazem  mg daily and amiodarone 200 mg daily.  - Continue po apixaban  - Continue amiodarone  - metoprolol and diltiazem held in MICU  in the setting of sepsis.   -resumed BB and CBB with titration as tolerated per BP

## 2022-03-01 NOTE — SUBJECTIVE & OBJECTIVE
Interval HPI:   Overnight events: No acute events overnight. Patient on the IMTA unit in room 51763/12263 A. Blood glucose improving. BG at and above goal on current insulin regimen (SSI, prandial, and basal insulin ). Steroid use- None.    Renal function- Normal   Vasopressors-  None       Diet diabetic [facility];  Calorie     Eatin%  Nausea: No  Hypoglycemia and intervention: No  Fever: No  TPN and/or TF: No    PMH, PSH, FH, SH updated and reviewed     ROS:    Review of Systems  Constitutional: Negative for weight changes.  Eyes: Negative for visual disturbance.  Respiratory: Negative for cough.   Cardiovascular: Negative for chest pain.  Gastrointestinal: Negative for nausea.  Endocrine: Negative for polyuria, polydipsia.  Musculoskeletal: Negative for back pain.  Skin: Negative for rash.  Neurological: Negative for syncope.  Psychiatric/Behavioral: Negative for depression.      Current Medications and/or Treatments Impacting Glycemic Control  Immunotherapy:    Immunosuppressants       None          Steroids:   Hormones (From admission, onward)                Start     Stop Route Frequency Ordered    22 1824  melatonin tablet 6 mg         -- Oral Nightly PRN 22 1724          Pressors:    Autonomic Drugs (From admission, onward)                None          Hyperglycemia/Diabetes Medications:   Antihyperglycemics (From admission, onward)                Start     Stop Route Frequency Ordered    22 1130  insulin aspart U-100 pen 5 Units         -- SubQ 3 times daily with meals 22 1019    22 2100  insulin detemir U-100 pen 14 Units         -- SubQ Nightly 22 1734    22 2226  insulin aspart U-100 pen 1-10 Units         -- SubQ Before meals & nightly PRN 22 2127             PHYSICAL EXAMINATION:  Vitals:    22 1539   BP:    Pulse: 68   Resp:    Temp:      Body mass index is 21.63 kg/m².    Physical Exam  Physical exam deferred due to COVID-19.

## 2022-03-01 NOTE — HPI
Reason for Consult: Management of T2DM, Hyperglycemia     Diabetes diagnosis year: 2010    Home Diabetes Medications:  Novolog 9 TIDWM + SSI; Levemir 20 units qd; Metformin 1000 mg BID    How often checking glucose at home? 1-3 x day   BG readings on regimen: 100-150's  Hypoglycemia on the regimen?  No  Missed doses on regimen?  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 History was not obtained from patient due to encephalopathy. Patient's daughter at bedside reports the patient was recently in the ED on 2/17 for a mechanical fall after being discharged from rehab the same day. CTH and cervical spine revealed  evolving late subacute infarct involving the right frontal lobe with questionable petechial hemorrhage. Vascular neurology evaluated the patient and cleared him for discharge from without any new interventions. He was also tested positive for COVID-19 and was discharged yesterday from the ED. He subsequently received one dose of sotrovimab infusion for COVID and was in a normal state of health until this morning when his daughter found him lethargic and barely responsive prompting her to bring him to the ED. She reports the patient had no complaints prior to developing his AMS. Of note, the patient was recently admitted to [facility] from 01/25 through 02/13 for AMS concerning for CVA, however he was treated for metabolic encephalopathy 2/2 to DKA/HHS, sepsis and BRENDAN. Upon arrival to the ED, he was placed on 6L NC, normotensive and tachycardic. Lactic 11.5, WBC 17.8, Glucose 1109, pH 7.17, Co2 15.6 HCO3 <5, AG unable to  calculate. sCr 3.1. CXR with intersitial opacities. He received ~4L of IVF, vancomycin, zosyn, initiated on insulin shifted for hyperkalemia and calcium for cardioprotection. ECG without noticeable ischemic changes. CTH without new changes- discussed findings with radiology. Patient was admitted to the MICU for further management. Endocrine consulted to manage type 2 diabetes and hyperglycemia. Since admission patient has had fluctuations in BG control.

## 2022-03-01 NOTE — SUBJECTIVE & OBJECTIVE
This encounter was provided through telemedicine.  Patient was transferred to the telemedicine service on:  02/28/2022   The patient location is: [id number] A admitted 2/19/2022  6:57 PM.  Present with the patient at the time of the telemed/virtual assessment: Family member    Interval History/Overnight Events:       Eating more since dinner last night - someone did come to the room to take his meal request; he only remembers 1 episode of diarrhea this morning; C diff is negative so Imodium p.r.n. started; encouraged once again to participate with therapy when they visit; denies pain to his buttocks or feet currently; no family currently at bedside    Review of Systems   Constitutional:  Positive for activity change, appetite change and fatigue. Negative for fever.   Respiratory:  Negative for cough and shortness of breath.    Gastrointestinal:  Positive for diarrhea. Negative for vomiting.   Musculoskeletal:  Positive for arthralgias, back pain and myalgias.   Neurological:  Positive for weakness (Generalized).      Inpatient Medications:  Scheduled Meds:   acetaminophen  650 mg Oral TID    amiodarone  200 mg Oral Daily    apixaban  5 mg Oral BID    ascorbic acid (vitamin C)  500 mg Oral BID    aspirin  81 mg Oral Daily    atorvastatin  40 mg Oral Daily    clopidogreL  75 mg Oral Daily    diclofenac sodium  4 g Topical (Top) QID    gabapentin  200 mg Oral TID    insulin aspart U-100  5 Units Subcutaneous TIDWM    insulin detemir U-100  14 Units Subcutaneous QHS    lacosamide  100 mg Oral Q12H    LIDOcaine  1 patch Transdermal Q24H    metoprolol tartrate  25 mg Oral BID    multivitamin  1 tablet Oral Daily    pantoprazole  40 mg Oral BID     Continuous Infusions:  PRN Meds:.acetaminophen, albuterol **AND** MDI Q4H, calcium carbonate, dextrose 10%, dextrose 10%, glucagon (human recombinant), glucose, glucose, insulin aspart U-100, loperamide, melatonin, ondansetron, oxyCODONE, sodium chloride 0.9%      Objective:      Temp:  [97.6 °F (36.4 °C)-98.6 °F (37 °C)] 97.9 °F (36.6 °C)  Pulse:  [59-68] 59  Resp:  [16-18] 17  SpO2:  [90 %-96 %] 90 %  BP: (113-127)/(57-66) 120/64      Intake/Output Summary (Last 24 hours) at 3/1/2022 1224  Last data filed at 3/1/2022 0900  Gross per 24 hour   Intake 480 ml   Output 725 ml   Net -245 ml          Body mass index is 21.63 kg/m².    Physical Exam  Vitals and nursing note reviewed.   Constitutional:       General: He is not in acute distress.     Appearance: He is normal weight. He is ill-appearing.   HENT:      Head: Normocephalic and atraumatic.      Right Ear: Hearing normal.      Left Ear: Hearing normal.      Nose: Nose normal.   Eyes:      General: No scleral icterus.        Right eye: No discharge.         Left eye: No discharge.      Extraocular Movements: Extraocular movements intact.   Cardiovascular:      Rate and Rhythm: Normal rate.   Pulmonary:      Effort: Pulmonary effort is normal. No accessory muscle usage or respiratory distress.   Skin:     Findings: No rash.   Neurological:      Mental Status: He is alert. Mental status is at baseline.      Cranial Nerves: No cranial nerve deficit.      Motor: Weakness (Generalized) present.   Psychiatric:         Attention and Perception: Attention normal.         Mood and Affect: Mood normal.         Speech: Speech normal.         Behavior: Behavior is cooperative.        Labs:  Recent Results (from the past 24 hour(s))   POCT glucose    Collection Time: 02/28/22  4:12 PM   Result Value Ref Range    POCT Glucose 245 (H) 70 - 110 mg/dL   POCT glucose    Collection Time: 02/28/22  9:22 PM   Result Value Ref Range    POCT Glucose 321 (H) 70 - 110 mg/dL   Comprehensive metabolic panel    Collection Time: 03/01/22  2:57 AM   Result Value Ref Range    Sodium 136 136 - 145 mmol/L    Potassium 3.8 3.5 - 5.1 mmol/L    Chloride 104 95 - 110 mmol/L    CO2 22 (L) 23 - 29 mmol/L    Glucose 206 (H) 70 - 110 mg/dL    BUN 23 8 - 23 mg/dL     Creatinine 0.9 0.5 - 1.4 mg/dL    Calcium 7.8 (L) 8.7 - 10.5 mg/dL    Total Protein 5.5 (L) 6.0 - 8.4 g/dL    Albumin 1.7 (L) 3.5 - 5.2 g/dL    Total Bilirubin 0.5 0.1 - 1.0 mg/dL    Alkaline Phosphatase 95 55 - 135 U/L    AST 11 10 - 40 U/L    ALT 8 (L) 10 - 44 U/L    Anion Gap 10 8 - 16 mmol/L    eGFR if African American >60.0 >60 mL/min/1.73 m^2    eGFR if non African American >60.0 >60 mL/min/1.73 m^2   Magnesium    Collection Time: 03/01/22  2:57 AM   Result Value Ref Range    Magnesium 1.6 1.6 - 2.6 mg/dL   CBC auto differential    Collection Time: 03/01/22  2:57 AM   Result Value Ref Range    WBC 7.64 3.90 - 12.70 K/uL    RBC 3.93 (L) 4.60 - 6.20 M/uL    Hemoglobin 11.2 (L) 14.0 - 18.0 g/dL    Hematocrit 34.1 (L) 40.0 - 54.0 %    MCV 87 82 - 98 fL    MCH 28.5 27.0 - 31.0 pg    MCHC 32.8 32.0 - 36.0 g/dL    RDW 15.2 (H) 11.5 - 14.5 %    Platelets 245 150 - 450 K/uL    MPV 9.8 9.2 - 12.9 fL    Immature Granulocytes 0.5 0.0 - 0.5 %    Gran # (ANC) 4.8 1.8 - 7.7 K/uL    Immature Grans (Abs) 0.04 0.00 - 0.04 K/uL    Lymph # 1.3 1.0 - 4.8 K/uL    Mono # 0.8 0.3 - 1.0 K/uL    Eos # 0.6 (H) 0.0 - 0.5 K/uL    Baso # 0.06 0.00 - 0.20 K/uL    nRBC 0 0 /100 WBC    Gran % 63.0 38.0 - 73.0 %    Lymph % 16.9 (L) 18.0 - 48.0 %    Mono % 10.7 4.0 - 15.0 %    Eosinophil % 8.1 (H) 0.0 - 8.0 %    Basophil % 0.8 0.0 - 1.9 %    Differential Method Automated    Phosphorus    Collection Time: 03/01/22  2:57 AM   Result Value Ref Range    Phosphorus 2.8 2.7 - 4.5 mg/dL   POCT glucose    Collection Time: 03/01/22  7:32 AM   Result Value Ref Range    POCT Glucose 139 (H) 70 - 110 mg/dL   POCT glucose    Collection Time: 03/01/22 12:11 PM   Result Value Ref Range    POCT Glucose 155 (H) 70 - 110 mg/dL        Lab Results   Component Value Date    [id number] Positive (A) 02/17/2022       Recent Labs   Lab 02/27/22  0228 02/28/22  0606 03/01/22  0257   WBC 8.72 9.33 7.64   LYMPH 15.4*  1.3 11.8*  1.1 16.9*  1.3   HGB 11.5* 11.9* 11.2*    HCT 35.8* 36.8* 34.1*    256 245       Recent Labs   Lab 02/27/22 0228 02/28/22  0606 03/01/22  0257    136 136   K 4.3 4.8 3.8    103 104   CO2 22* 20* 22*   BUN 17 22 23   CREATININE 0.9 1.0 0.9   * 337* 206*   CALCIUM 8.0* 8.0* 7.8*   MG 1.6 1.6 1.6   PHOS 3.0 2.8 2.8       Recent Labs   Lab 02/27/22 0228 02/28/22  0606 03/01/22  0257   ALKPHOS 113 109 95   ALT 11 9* 8*   AST 18 20 11   ALBUMIN 1.8* 1.8* 1.7*   PROT 5.8* 6.0 5.5*   BILITOT 0.6 0.6 0.5          Recent Labs     02/27/22  2130   DDIMER 0.37   FERRITIN 312*            All labs within the last 24 hours were reviewed.     Microbiology:  Microbiology Results (last 7 days)       Procedure Component Value Units Date/Time    Clostridium difficile EIA [[id number]] Collected: 02/28/22 1054    Order Status: Completed Specimen: Stool Updated: 03/01/22 0118     C. diff Antigen Negative     C difficile Toxins A+B, EIA Negative     Comment: Testing not recommended for children <24 months old.       Blood culture x two cultures. Draw prior to antibiotics. [[id number]] Collected: 02/19/22 1932    Order Status: Completed Specimen: Blood from Peripheral, Hand, Left Updated: 02/24/22 2212     Blood Culture, Routine No growth after 5 days.    Narrative:      Aerobic and anaerobic    Blood culture x two cultures. Draw prior to antibiotics. [[id number]] Collected: 02/19/22 1932    Order Status: Completed Specimen: Blood from Peripheral, Hand, Left Updated: 02/24/22 2212     Blood Culture, Routine No growth after 5 days.    Narrative:      Aerobic and anaerobic              Imaging  ECG Results              EKG 12-lead (Final result)  Result time 02/20/22 09:32:21      Final result by Interface, Lab In [facility] (02/20/22 09:32:21)                   Narrative:    Test Reason :     Vent. Rate : 126 BPM     Atrial Rate : 120 BPM     P-R Int : 000 ms          QRS Dur : 162 ms      QT Int : 412 ms       P-R-T Axes : 000 059 -31 degrees     QTc  Int : 596 ms    Wide QRS tachycardia  Right bundle branch block  T wave abnormality, consider inferior ischemia  Abnormal ECG  When compared with ECG of 19-FEB-2022 19:09,  Wide QRS tachycardia has replaced Junctional rhythm or SVT  Confirmed by Lencho BARROS MD ([id number]) on 2/20/2022 9:32:14 AM    Referred By: [email or web address]   SELF           Confirmed By:Lencho BARROS MD                                     Repeat EKG 12-lead (Final result)  Result time 02/20/22 09:34:32      Final result by Interface, Lab In [facility] (02/20/22 09:34:32)                   Narrative:    Test Reason :     Vent. Rate : 118 BPM     Atrial Rate : 117 BPM     P-R Int : 000 ms          QRS Dur : 118 ms      QT Int : 374 ms       P-R-T Axes : 000 056 -21 degrees     QTc Int : 524 ms    Accelerated Junctional rhythm vs SVT with artifact  Nonspecific intraventricular conduction delay  ST and T wave abnormality, consider inferior ischemia  Prolonged QT  Abnormal ECG  When compared with ECG of 27-JAN-2022 00:52,  Rhythm changed  Vent. rate has increased BY  50 BPM  Questionable change in QRS duration  Confirmed by Lencho BARROS MD ([id number]) on 2/20/2022 9:34:22 AM    Referred By: [email or web address]   SELF           Confirmed By:Lencho BARROS MD                                    Results for orders placed during the hospital encounter of 01/25/22    Echo    Interpretation Summary  · There is abnormal septal wall motion.  · The left ventricle is normal in size with low normal systolic function.  · The estimated ejection fraction is 50%.  · Normal left ventricular diastolic function.  · Mild left atrial enlargement.  · Normal right ventricular size with normal right ventricular systolic function.  · Mild mitral regurgitation.  · There are segmental left ventricular wall motion abnormalities.  · Mild tricuspid regurgitation.  · Elevated central venous pressure (15 mmHg).      X-Ray Chest AP Portable  Narrative: EXAMINATION:  XR CHEST AP PORTABLE    CLINICAL HISTORY:  Sob,  cough;    TECHNIQUE:  Single frontal view of the chest was performed.    COMPARISON:  Chest radiograph February 19, 2022    FINDINGS:  AP portable chest radiographic examination is submitted.  When accounting for difference in position and technique the appearance of the cardiomediastinal silhouette is thought stable.    There appears to be mild prominence of the central pulmonary vascular, there is bilateral pattern of pulmonary opacity consistent with interstitial and ground-glass and patchy alveolar infiltrates, this appears superimposed on chronic change.  There is no evidence for large pleural effusion there is minimal blunting at the right costophrenic angle that may relate to a small amount of pleural fluid.  There is no evidence for pneumothorax.  The osseous structures demonstrate chronic change.  Impression: Mild prominence of the central pulmonary vascular, there is appearance of bilateral opacity consistent with interstitial and ground-glass and patchy alveolar infiltrates.    Electronically signed by: Frantz Lopez  Date:    02/25/2022  Time:    22:03      All imaging within the last 24 hours was reviewed.       Discharge Planning   ALLIE: 3/9/2022     Code Status: DNR   Is the patient medically ready for discharge?: No    Reason for patient still in hospital (select all that apply): Patient trending condition, Laboratory test, Treatment, and Pending disposition  Discharge Plan A: Rehab   Discharge Delays: None known at this time

## 2022-03-01 NOTE — ASSESSMENT & PLAN NOTE
Hx of CVA in Jan 2022. CT Head with evolving infarcts in right frontal and left cerebellar hemispheres. No concern for acute stroke this admit per discussion with radiology.   - Continue home antiplatelets, statin and eliquis

## 2022-03-02 PROBLEM — Z75.8 DISCHARGE PLANNING ISSUES: Status: ACTIVE | Noted: 2022-03-02

## 2022-03-02 LAB
ALBUMIN SERPL BCP-MCNC: 1.7 G/DL (ref 3.5–5.2)
ALP SERPL-CCNC: 90 U/L (ref 55–135)
ALT SERPL W/O P-5'-P-CCNC: 8 U/L (ref 10–44)
ANION GAP SERPL CALC-SCNC: 12 MMOL/L (ref 8–16)
AST SERPL-CCNC: 17 U/L (ref 10–40)
BASOPHILS # BLD AUTO: 0.06 K/UL (ref 0–0.2)
BASOPHILS NFR BLD: 0.7 % (ref 0–1.9)
BILIRUB SERPL-MCNC: 0.4 MG/DL (ref 0.1–1)
BUN SERPL-MCNC: 19 MG/DL (ref 8–23)
CALCIUM SERPL-MCNC: 8 MG/DL (ref 8.7–10.5)
CHLORIDE SERPL-SCNC: 105 MMOL/L (ref 95–110)
CO2 SERPL-SCNC: 24 MMOL/L (ref 23–29)
CREAT SERPL-MCNC: 0.8 MG/DL (ref 0.5–1.4)
DIFFERENTIAL METHOD: ABNORMAL
EOSINOPHIL # BLD AUTO: 0.5 K/UL (ref 0–0.5)
EOSINOPHIL NFR BLD: 6.4 % (ref 0–8)
ERYTHROCYTE [DISTWIDTH] IN BLOOD BY AUTOMATED COUNT: 15.3 % (ref 11.5–14.5)
EST. GFR  (AFRICAN AMERICAN): >60 ML/MIN/1.73 M^2
EST. GFR  (NON AFRICAN AMERICAN): >60 ML/MIN/1.73 M^2
GLUCOSE SERPL-MCNC: 85 MG/DL (ref 70–110)
HCT VFR BLD AUTO: 35.5 % (ref 40–54)
HGB BLD-MCNC: 11.3 G/DL (ref 14–18)
IMM GRANULOCYTES # BLD AUTO: 0.03 K/UL (ref 0–0.04)
IMM GRANULOCYTES NFR BLD AUTO: 0.4 % (ref 0–0.5)
LYMPHOCYTES # BLD AUTO: 1.7 K/UL (ref 1–4.8)
LYMPHOCYTES NFR BLD: 20.8 % (ref 18–48)
MAGNESIUM SERPL-MCNC: 1.6 MG/DL (ref 1.6–2.6)
MCH RBC QN AUTO: 28 PG (ref 27–31)
MCHC RBC AUTO-ENTMCNC: 31.8 G/DL (ref 32–36)
MCV RBC AUTO: 88 FL (ref 82–98)
MONOCYTES # BLD AUTO: 0.8 K/UL (ref 0.3–1)
MONOCYTES NFR BLD: 9.5 % (ref 4–15)
NEUTROPHILS # BLD AUTO: 5.1 K/UL (ref 1.8–7.7)
NEUTROPHILS NFR BLD: 62.2 % (ref 38–73)
NRBC BLD-RTO: 0 /100 WBC
PHOSPHATE SERPL-MCNC: 3.1 MG/DL (ref 2.7–4.5)
PLATELET # BLD AUTO: 252 K/UL (ref 150–450)
PMV BLD AUTO: 9.7 FL (ref 9.2–12.9)
POCT GLUCOSE: 105 MG/DL (ref 70–110)
POCT GLUCOSE: 285 MG/DL (ref 70–110)
POCT GLUCOSE: 304 MG/DL (ref 70–110)
POCT GLUCOSE: 84 MG/DL (ref 70–110)
POCT GLUCOSE: 98 MG/DL (ref 70–110)
POTASSIUM SERPL-SCNC: 3.7 MMOL/L (ref 3.5–5.1)
PROT SERPL-MCNC: 5.7 G/DL (ref 6–8.4)
RBC # BLD AUTO: 4.04 M/UL (ref 4.6–6.2)
SODIUM SERPL-SCNC: 141 MMOL/L (ref 136–145)
WBC # BLD AUTO: 8.22 K/UL (ref 3.9–12.7)

## 2022-03-02 PROCEDURE — 99232 PR SUBSEQUENT HOSPITAL CARE,LEVL II: ICD-10-PCS | Mod: HCNC,,, | Performed by: NURSE PRACTITIONER

## 2022-03-02 PROCEDURE — 25000003 PHARM REV CODE 250: Mod: HCNC | Performed by: INTERNAL MEDICINE

## 2022-03-02 PROCEDURE — 63600175 PHARM REV CODE 636 W HCPCS: Mod: HCNC | Performed by: NURSE PRACTITIONER

## 2022-03-02 PROCEDURE — 25000003 PHARM REV CODE 250: Mod: HCNC | Performed by: STUDENT IN AN ORGANIZED HEALTH CARE EDUCATION/TRAINING PROGRAM

## 2022-03-02 PROCEDURE — 99232 SBSQ HOSP IP/OBS MODERATE 35: CPT | Mod: HCNC,,, | Performed by: NURSE PRACTITIONER

## 2022-03-02 PROCEDURE — 85025 COMPLETE CBC W/AUTO DIFF WBC: CPT | Mod: HCNC | Performed by: STUDENT IN AN ORGANIZED HEALTH CARE EDUCATION/TRAINING PROGRAM

## 2022-03-02 PROCEDURE — 84100 ASSAY OF PHOSPHORUS: CPT | Mod: HCNC | Performed by: STUDENT IN AN ORGANIZED HEALTH CARE EDUCATION/TRAINING PROGRAM

## 2022-03-02 PROCEDURE — 94761 N-INVAS EAR/PLS OXIMETRY MLT: CPT | Mod: HCNC

## 2022-03-02 PROCEDURE — 83735 ASSAY OF MAGNESIUM: CPT | Mod: HCNC | Performed by: STUDENT IN AN ORGANIZED HEALTH CARE EDUCATION/TRAINING PROGRAM

## 2022-03-02 PROCEDURE — 36415 COLL VENOUS BLD VENIPUNCTURE: CPT | Mod: HCNC | Performed by: STUDENT IN AN ORGANIZED HEALTH CARE EDUCATION/TRAINING PROGRAM

## 2022-03-02 PROCEDURE — 99232 SBSQ HOSP IP/OBS MODERATE 35: CPT | Mod: HCNC,95,, | Performed by: INTERNAL MEDICINE

## 2022-03-02 PROCEDURE — 99900035 HC TECH TIME PER 15 MIN (STAT): Mod: HCNC

## 2022-03-02 PROCEDURE — 27000207 HC ISOLATION: Mod: HCNC

## 2022-03-02 PROCEDURE — 97116 GAIT TRAINING THERAPY: CPT | Mod: HCNC

## 2022-03-02 PROCEDURE — 99232 PR SUBSEQUENT HOSPITAL CARE,LEVL II: ICD-10-PCS | Mod: HCNC,95,, | Performed by: INTERNAL MEDICINE

## 2022-03-02 PROCEDURE — 12000002 HC ACUTE/MED SURGE SEMI-PRIVATE ROOM: Mod: HCNC

## 2022-03-02 PROCEDURE — 80053 COMPREHEN METABOLIC PANEL: CPT | Mod: HCNC | Performed by: STUDENT IN AN ORGANIZED HEALTH CARE EDUCATION/TRAINING PROGRAM

## 2022-03-02 PROCEDURE — 27000221 HC OXYGEN, UP TO 24 HOURS: Mod: HCNC

## 2022-03-02 PROCEDURE — 97530 THERAPEUTIC ACTIVITIES: CPT | Mod: HCNC

## 2022-03-02 RX ORDER — INSULIN ASPART 100 [IU]/ML
0-5 INJECTION, SOLUTION INTRAVENOUS; SUBCUTANEOUS
Status: DISCONTINUED | OUTPATIENT
Start: 2022-03-02 | End: 2022-03-02

## 2022-03-02 RX ORDER — INSULIN ASPART 100 [IU]/ML
1-10 INJECTION, SOLUTION INTRAVENOUS; SUBCUTANEOUS
Status: DISCONTINUED | OUTPATIENT
Start: 2022-03-02 | End: 2022-03-03

## 2022-03-02 RX ADMIN — DICLOFENAC SODIUM 4 G: 10 GEL TOPICAL at 09:03

## 2022-03-02 RX ADMIN — ACETAMINOPHEN 650 MG: 325 TABLET ORAL at 05:03

## 2022-03-02 RX ADMIN — ACETAMINOPHEN 650 MG: 325 TABLET ORAL at 10:03

## 2022-03-02 RX ADMIN — MULTIPLE VITAMINS W/ MINERALS TAB 1 TABLET: TAB at 10:03

## 2022-03-02 RX ADMIN — OXYCODONE HYDROCHLORIDE AND ACETAMINOPHEN 500 MG: 500 TABLET ORAL at 10:03

## 2022-03-02 RX ADMIN — LIDOCAINE 1 PATCH: 50 PATCH CUTANEOUS at 05:03

## 2022-03-02 RX ADMIN — INSULIN ASPART 6 UNITS: 100 INJECTION, SOLUTION INTRAVENOUS; SUBCUTANEOUS at 05:03

## 2022-03-02 RX ADMIN — METOPROLOL TARTRATE 25 MG: 25 TABLET, FILM COATED ORAL at 09:03

## 2022-03-02 RX ADMIN — INSULIN ASPART 8 UNITS: 100 INJECTION, SOLUTION INTRAVENOUS; SUBCUTANEOUS at 01:03

## 2022-03-02 RX ADMIN — ACETAMINOPHEN 650 MG: 325 TABLET ORAL at 09:03

## 2022-03-02 RX ADMIN — ASPIRIN 81 MG: 81 TABLET, COATED ORAL at 10:03

## 2022-03-02 RX ADMIN — GABAPENTIN 200 MG: 100 CAPSULE ORAL at 10:03

## 2022-03-02 RX ADMIN — PANTOPRAZOLE SODIUM 40 MG: 40 TABLET, DELAYED RELEASE ORAL at 09:03

## 2022-03-02 RX ADMIN — INSULIN ASPART 5 UNITS: 100 INJECTION, SOLUTION INTRAVENOUS; SUBCUTANEOUS at 01:03

## 2022-03-02 RX ADMIN — INSULIN ASPART 5 UNITS: 100 INJECTION, SOLUTION INTRAVENOUS; SUBCUTANEOUS at 05:03

## 2022-03-02 RX ADMIN — DICLOFENAC SODIUM 4 G: 10 GEL TOPICAL at 05:03

## 2022-03-02 RX ADMIN — DICLOFENAC SODIUM 4 G: 10 GEL TOPICAL at 10:03

## 2022-03-02 RX ADMIN — GABAPENTIN 200 MG: 100 CAPSULE ORAL at 09:03

## 2022-03-02 RX ADMIN — LACOSAMIDE 100 MG: 100 TABLET, FILM COATED ORAL at 09:03

## 2022-03-02 RX ADMIN — LACOSAMIDE 100 MG: 100 TABLET, FILM COATED ORAL at 10:03

## 2022-03-02 RX ADMIN — APIXABAN 5 MG: 5 TABLET, FILM COATED ORAL at 09:03

## 2022-03-02 RX ADMIN — PANTOPRAZOLE SODIUM 40 MG: 40 TABLET, DELAYED RELEASE ORAL at 10:03

## 2022-03-02 RX ADMIN — OXYCODONE HYDROCHLORIDE AND ACETAMINOPHEN 500 MG: 500 TABLET ORAL at 09:03

## 2022-03-02 RX ADMIN — APIXABAN 5 MG: 5 TABLET, FILM COATED ORAL at 10:03

## 2022-03-02 RX ADMIN — CLOPIDOGREL 75 MG: 75 TABLET, FILM COATED ORAL at 10:03

## 2022-03-02 RX ADMIN — GABAPENTIN 200 MG: 100 CAPSULE ORAL at 05:03

## 2022-03-02 RX ADMIN — ATORVASTATIN CALCIUM 40 MG: 20 TABLET, FILM COATED ORAL at 10:03

## 2022-03-02 RX ADMIN — DICLOFENAC SODIUM 4 G: 10 GEL TOPICAL at 01:03

## 2022-03-02 NOTE — PT/OT/SLP PROGRESS
Physical Therapy Treatment    Patient Name:  Kamar Muñoz   MRN:  [id number]    Recommendations:     Discharge Recommendations:  rehabilitation facility   Discharge Equipment Recommendations: bedside commode   Barriers to discharge: Decreased caregiver support at current level of function     Assessment:     Kamar Muñoz is a 76 y.o. male admitted with a medical diagnosis of Encephalopathy, metabolic.  He presents with the following impairments/functional limitations:  weakness, impaired endurance, impaired self care skills, impaired functional mobilty, gait instability, impaired balance. Pt continues to demo' notable weakness with mobility, required moderate assistance to ambulate short distance to chair. Pt continues to present below functional baseline. Upon discharge, pt would benefit from continued skilled therapy intervention at an inpatient rehabilitation facility to progress toward more independent mobility. At this time, pt would continue to benefit from acute skilled therapy intervention to address deficits and progress toward prior level of function.       Rehab Prognosis: Good; patient would benefit from acute skilled PT services to address these deficits and reach maximum level of function.    Recent Surgery: * No surgery found *      Plan:     During this hospitalization, patient to be seen 4 x/week to address the identified rehab impairments via gait training, therapeutic activities, therapeutic exercises, neuromuscular re-education and progress toward the following goals:    · Plan of Care Expires:  03/22/22    Subjective     Chief Complaint: Pt c/o fatigue and weakness in L UE And LE   Patient/Family Comments/goals: to get better and return home   Pain/Comfort:  · Pain Rating 1: 0/10  · Pain Rating Post-Intervention 1: 0/10      Objective:     Communicated with RN prior to session.  Patient found HOB elevated with telemetry, oxygen upon PT entry to room.     General Precautions: Standard,  fall, droplet, airborne, contact   Orthopedic Precautions:N/A   Braces: N/A  Respiratory Status: Nasal cannula, flow 2 L/min     Functional Mobility:  · Bed Mobility:     · Supine to Sit: minimum assistance  · Transfers:     · Sit to Stand: 2x from elevated bed with contact guard assistance with rolling walker  Gait: Pt ambulated 6 ft from bed to chair with RW and moderate assistance. Pt demo'd small step size, decreased foot clearance, narrow NICOLE, impaired step initiation, excessive hip and knee flexion increasing throughout transfer. Facilitation provided for lateral weight shift to promote step initiation. Cuing provided for upright posture, direction of movement, step initiation, navigation of RW.       AM-PAC 6 CLICK MOBILITY  Turning over in bed (including adjusting bedclothes, sheets and blankets)?: 3  Sitting down on and standing up from a chair with arms (e.g., wheelchair, bedside commode, etc.): 3  Moving from lying on back to sitting on the side of the bed?: 3  Moving to and from a bed to a chair (including a wheelchair)?: 2  Need to walk in hospital room?: 2  Climbing 3-5 steps with a railing?: 1  Basic Mobility Total Score: 14       Therapeutic Activities and Exercises:   PT stood 1x and maintained standing for ~2 mins before requiring return to sitting 2/2 reports of fatigue.   Pt educated on role of PT/POC. Pt verbalized understanding.   Pt encouraged to only perform OOB mobility with assistance from nursing/therapy. Pt agreeable.   Pt encouraged to ambulate daily with assistance/supervision from nursing/therapy. Pt agreeable.  Pt educated on seated exercises to perform 20x, 3x/day. Exercises included bilateral LAQ, marching, ankle DF/PF      Patient left up in chair with all lines intact, call button in reach and RN  notified..    GOALS:   Multidisciplinary Problems     Physical Therapy Goals        Problem: Physical Therapy Goal    Goal Priority Disciplines Outcome Goal Variances Interventions    Physical Therapy Goal     PT, PT/OT Ongoing, Progressing     Description: Goals to be met by: 3/10/22     Patient will increase functional independence with mobility by performin. Supine to sit with MInimal Assistance - met   1a. Supine to sit with SBA   2. Sit to supine with MInimal Assistance - met   2a. Sit to supine with SBA   3. Sit to stand transfer with Minimal Assistance - met   3a. Sit to stand transfer SBA  4. Gait  x 25 feet with Minimal Assistance using LRAD, if needed. - met   4a. Gait x150 ft with SBA using RW or LRAD as needed  5. Sitting at edge of bed x10 minutes with Modified [place]  6. Stand for 3 minutes with Minimal Assistance using LRAD, if needed  7. Lower extremity exercise program x10 reps per handout, with independence                     Time Tracking:     PT Received On: 22  PT Start Time: 0910     PT Stop Time: 933  PT Total Time (min): 23 min     Billable Minutes: Gait Training 8 mins  and Therapeutic Activity 15 mins     Treatment Type: Treatment  PT/PTA: PT     PTA Visit Number: 0     2022

## 2022-03-02 NOTE — PLAN OF CARE
JASIEL faxed updated clinicals to [facility] for review. JASIEL will continue to follow.     Marley Gregg LMSW   - [facility]  Ext. [phone]

## 2022-03-02 NOTE — ASSESSMENT & PLAN NOTE
Endocrinology consulted for BG management.   BG goal 140-180    30% reduction in home dosing.     - Levemir Flex Pen 11 units nightly (20% reduction due to FBG below goal).   - Novolog (aspart) insulin 5 Units SQ TIDWM and prn for BG excursions MDC SSI (150/25).   - BG checks AC/HS      ** Please notify Endocrine for any change and/or advance in diet**  ** Please call Endocrine for any BG related issues **    Discharge Planning:   TBD. Please notify endocrinology prior to discharge.

## 2022-03-02 NOTE — PT/OT/SLP PROGRESS
Occupational Therapy      Patient Name:  Kamar Muñoz   MRN:  [id number]    Patient not seen today secondary to Patient unwilling to participate due to stomach ache. Will follow-up.    3/2/2022

## 2022-03-02 NOTE — PROGRESS NOTES
[facility] - Intensive Care ([place] [address])  [facility] Medicine  Telemedicine Progress Note    Patient Name: Kamar Muñoz  MRN: [id number]  Patient Class: IP- Inpatient   Admission Date: 2/19/2022  Length of Stay: 10 days  Attending Physician: Komal Huynh MD  Primary Care Provider: Basim Guerrero MD          Subjective:     Principal Problem:Encephalopathy, metabolic        HPI:  Kamar Muñoz is a 76 year old male with past medical history of CAD s/p 2 LAUREN in RCA (Jan 2022), HTN, HLD, p. A. 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 History was not obtained from patient due to encephalopathy. Patient's daughter at bedside reports the patient was recently in the ED on 2/17 for a mechanical fall after being discharged from rehab the same day. CTH and cervical spine revealed  evolving late subacute infarct involving the right frontal lobe with questionable petechial hemorrhage. Vascular neurology evaluated the patient and cleared him for discharge from without any new interventions. He was also tested positive for COVID-19 and was discharged yesterday from the ED. He subsequently received one dose of sotrovimab infusion for COVID and was in a normal state of health until this morning when his daughter found him lethargic and barely responsive prompting her to bring him to the ED. She reports the patient had no complaints prior to developing his AMS. Of note, [place] [facility] [place] AMS concerning for CVA, however  he was treated for metabolic encephalopathy 2/2 to DKA/HHS, sepsis and BRENDAN.      Upon arrival to the ED, he was placed on 6L NC, normotensive and tachycardic. Lactic 11.5, WBC 17.8, Glucose 1109, pH 7.17, Co2 15.6 HCO3 <5, AG unable to calculate. sCr 3.1. CXR with intersitial opacities. He received ~4L of IVF, vancomycin, zosyn, initiated on insulin shifted for hyperkalemia and calcium for cardioprotection. ECG without noticeable ischemic changes. CTH without new changes- discussed findings with radiology. Patient was admitted to the MICU for further management.        Overview/Hospital Course:  ICU Course:  Placed on remdesivir and broad spectrum IV abx in addition to insulin per DKA protocol. In MICU: Weaned supp O2 to room air; Transitioned to subq insulin; Improvement of AMS. BRENDAN improving gradually. Pt had discussion w/ family in MICU and transitioned from full code to DNR/DNI.  Step down to  initiated 2/21.    Hospital Medicine Course:  Pt w/ episode of CP and hypoglycemia upon stepdown. Ischemic work-up largely unremarkable with trop down trending from admission. Detemir dosing adjusted from BID to qhs. He had additional hypoglycemic episode upon re-uptitration of long-acting insulin from 12 to 15. Dosing decreased to 13u as patient was hyperglycemic to 230s w/12u qhs. Worsening hyperglycemia to 270s- insulin dose modified to 3u TID wm, and detemir 14u qhs.    Episode of abdominal pain and diarrhea 02/25- appeared to be 2/2 antibiotic use. Antibiotics stopped 02/25. Pt w/persistent diarrhea- c diff studies sent- loperamide stopped.     Pt was found to have St 3 sacral pressure ulcer.         This encounter was provided through telemedicine.  Patient was transferred to the telemedicine service on:  02/28/2022   The patient location is: [id number] A admitted 2/19/2022  6:57 PM.  Present with the patient at the time of the telemed/virtual assessment: Family member    Interval History/Overnight Events:        Eating more since dinner last night - someone did come to the room to take his meal request; he only remembers 1 episode of diarrhea this morning; C diff is negative so Imodium p.r.n. started; encouraged once again to participate with therapy when they visit; denies pain to his buttocks or feet currently; no family currently at bedside    Review of Systems   Constitutional:  Positive for activity change, appetite change and fatigue. Negative for fever.   Respiratory:  Negative for cough and shortness of breath.    Gastrointestinal:  Positive for diarrhea. Negative for vomiting.   Musculoskeletal:  Positive for arthralgias, back pain and myalgias.   Neurological:  Positive for weakness (Generalized).      Inpatient Medications:  Scheduled Meds:   acetaminophen  650 mg Oral TID    amiodarone  200 mg Oral Daily    apixaban  5 mg Oral BID    ascorbic acid (vitamin C)  500 mg Oral BID    aspirin  81 mg Oral Daily    atorvastatin  40 mg Oral Daily    clopidogreL  75 mg Oral Daily    diclofenac sodium  4 g Topical (Top) QID    gabapentin  200 mg Oral TID    insulin aspart U-100  5 Units Subcutaneous TIDWM    insulin detemir U-100  14 Units Subcutaneous QHS    lacosamide  100 mg Oral Q12H    LIDOcaine  1 patch Transdermal Q24H    metoprolol tartrate  25 mg Oral BID    multivitamin  1 tablet Oral Daily    pantoprazole  40 mg Oral BID     Continuous Infusions:  PRN Meds:.acetaminophen, albuterol **AND** MDI Q4H, calcium carbonate, dextrose 10%, dextrose 10%, glucagon (human recombinant), glucose, glucose, insulin aspart U-100, loperamide, melatonin, ondansetron, oxyCODONE, sodium chloride 0.9%      Objective:     Temp:  [97.6 °F (36.4 °C)-98.6 °F (37 °C)] 97.9 °F (36.6 °C)  Pulse:  [59-68] 59  Resp:  [16-18] 17  SpO2:  [90 %-96 %] 90 %  BP: (113-127)/(57-66) 120/64      Intake/Output Summary (Last 24 hours) at 3/1/2022 1224  Last data filed at 3/1/2022 0900  Gross per 24 hour   Intake 480 ml   Output 725  ml   Net -245 ml          Body mass index is 21.63 kg/m².    Physical Exam  Vitals and nursing note reviewed.   Constitutional:       General: He is not in acute distress.     Appearance: He is normal weight. He is ill-appearing.   HENT:      Head: Normocephalic and atraumatic.      Right Ear: Hearing normal.      Left Ear: Hearing normal.      Nose: Nose normal.   Eyes:      General: No scleral icterus.        Right eye: No discharge.         Left eye: No discharge.      Extraocular Movements: Extraocular movements intact.   Cardiovascular:      Rate and Rhythm: Normal rate.   Pulmonary:      Effort: Pulmonary effort is normal. No accessory muscle usage or respiratory distress.   Skin:     Findings: No rash.   Neurological:      Mental Status: He is alert. Mental status is at baseline.      Cranial Nerves: No cranial nerve deficit.      Motor: Weakness (Generalized) present.   Psychiatric:         Attention and Perception: Attention normal.         Mood and Affect: Mood normal.         Speech: Speech normal.         Behavior: Behavior is cooperative.        Labs:  Recent Results (from the past 24 hour(s))   POCT glucose    Collection Time: 02/28/22  4:12 PM   Result Value Ref Range    POCT Glucose 245 (H) 70 - 110 mg/dL   POCT glucose    Collection Time: 02/28/22  9:22 PM   Result Value Ref Range    POCT Glucose 321 (H) 70 - 110 mg/dL   Comprehensive metabolic panel    Collection Time: 03/01/22  2:57 AM   Result Value Ref Range    Sodium 136 136 - 145 mmol/L    Potassium 3.8 3.5 - 5.1 mmol/L    Chloride 104 95 - 110 mmol/L    CO2 22 (L) 23 - 29 mmol/L    Glucose 206 (H) 70 - 110 mg/dL    BUN 23 8 - 23 mg/dL    Creatinine 0.9 0.5 - 1.4 mg/dL    Calcium 7.8 (L) 8.7 - 10.5 mg/dL    Total Protein 5.5 (L) 6.0 - 8.4 g/dL    Albumin 1.7 (L) 3.5 - 5.2 g/dL    Total Bilirubin 0.5 0.1 - 1.0 mg/dL    Alkaline Phosphatase 95 55 - 135 U/L    AST 11 10 - 40 U/L    ALT 8 (L) 10 - 44 U/L    Anion Gap 10 8 - 16 mmol/L    eGFR if  African American >60.0 >60 mL/min/1.73 m^2    eGFR if non African American >60.0 >60 mL/min/1.73 m^2   Magnesium    Collection Time: 03/01/22  2:57 AM   Result Value Ref Range    Magnesium 1.6 1.6 - 2.6 mg/dL   CBC auto differential    Collection Time: 03/01/22  2:57 AM   Result Value Ref Range    WBC 7.64 3.90 - 12.70 K/uL    RBC 3.93 (L) 4.60 - 6.20 M/uL    Hemoglobin 11.2 (L) 14.0 - 18.0 g/dL    Hematocrit 34.1 (L) 40.0 - 54.0 %    MCV 87 82 - 98 fL    MCH 28.5 27.0 - 31.0 pg    MCHC 32.8 32.0 - 36.0 g/dL    RDW 15.2 (H) 11.5 - 14.5 %    Platelets 245 150 - 450 K/uL    MPV 9.8 9.2 - 12.9 fL    Immature Granulocytes 0.5 0.0 - 0.5 %    Gran # (ANC) 4.8 1.8 - 7.7 K/uL    Immature Grans (Abs) 0.04 0.00 - 0.04 K/uL    Lymph # 1.3 1.0 - 4.8 K/uL    Mono # 0.8 0.3 - 1.0 K/uL    Eos # 0.6 (H) 0.0 - 0.5 K/uL    Baso # 0.06 0.00 - 0.20 K/uL    nRBC 0 0 /100 WBC    Gran % 63.0 38.0 - 73.0 %    Lymph % 16.9 (L) 18.0 - 48.0 %    Mono % 10.7 4.0 - 15.0 %    Eosinophil % 8.1 (H) 0.0 - 8.0 %    Basophil % 0.8 0.0 - 1.9 %    Differential Method Automated    Phosphorus    Collection Time: 03/01/22  2:57 AM   Result Value Ref Range    Phosphorus 2.8 2.7 - 4.5 mg/dL   POCT glucose    Collection Time: 03/01/22  7:32 AM   Result Value Ref Range    POCT Glucose 139 (H) 70 - 110 mg/dL   POCT glucose    Collection Time: 03/01/22 12:11 PM   Result Value Ref Range    POCT Glucose 155 (H) 70 - 110 mg/dL        Lab Results   Component Value Date    [id number] Positive (A) 02/17/2022       Recent Labs   Lab 02/27/22 0228 02/28/22 0606 03/01/22 0257   WBC 8.72 9.33 7.64   LYMPH 15.4*  1.3 11.8*  1.1 16.9*  1.3   HGB 11.5* 11.9* 11.2*   HCT 35.8* 36.8* 34.1*    256 245       Recent Labs   Lab 02/27/22 0228 02/28/22 0606 03/01/22 0257    136 136   K 4.3 4.8 3.8    103 104   CO2 22* 20* 22*   BUN 17 22 23   CREATININE 0.9 1.0 0.9   * 337* 206*   CALCIUM 8.0* 8.0* 7.8*   MG 1.6 1.6 1.6   PHOS 3.0 2.8 2.8        Recent Labs   Lab 02/27/22  0228 02/28/22  0606 03/01/22  0257   ALKPHOS 113 109 95   ALT 11 9* 8*   AST 18 20 11   ALBUMIN 1.8* 1.8* 1.7*   PROT 5.8* 6.0 5.5*   BILITOT 0.6 0.6 0.5          Recent Labs     02/27/22  2130   DDIMER 0.37   FERRITIN 312*            All labs within the last 24 hours were reviewed.     Microbiology:  Microbiology Results (last 7 days)       Procedure Component Value Units Date/Time    Clostridium difficile EIA [[id number]] Collected: 02/28/22 1054    Order Status: Completed Specimen: Stool Updated: 03/01/22 0118     C. diff Antigen Negative     C difficile Toxins A+B, EIA Negative     Comment: Testing not recommended for children <24 months old.       Blood culture x two cultures. Draw prior to antibiotics. [[id number]] Collected: 02/19/22 1932    Order Status: Completed Specimen: Blood from Peripheral, Hand, Left Updated: 02/24/22 2212     Blood Culture, Routine No growth after 5 days.    Narrative:      Aerobic and anaerobic    Blood culture x two cultures. Draw prior to antibiotics. [[id number]] Collected: 02/19/22 1932    Order Status: Completed Specimen: Blood from Peripheral, Hand, Left Updated: 02/24/22 2212     Blood Culture, Routine No growth after 5 days.    Narrative:      Aerobic and anaerobic              Imaging  ECG Results              EKG 12-lead (Final result)  Result time 02/20/22 09:32:21      Final result by Interface, Lab In [facility] (02/20/22 09:32:21)                   Narrative:    Test Reason :     Vent. Rate : 126 BPM     Atrial Rate : 120 BPM     P-R Int : 000 ms          QRS Dur : 162 ms      QT Int : 412 ms       P-R-T Axes : 000 059 -31 degrees     QTc Int : 596 ms    Wide QRS tachycardia  Right bundle branch block  T wave abnormality, consider inferior ischemia  Abnormal ECG  When compared with ECG of 19-FEB-2022 19:09,  Wide QRS tachycardia has replaced Junctional rhythm or SVT  Confirmed by Lencho BARROS MD ([id number]) on 2/20/2022 9:32:14  AM    Referred By: GAGAN   SELF           Confirmed By:Lencho BARROS MD                                     Repeat EKG 12-lead (Final result)  Result time 02/20/22 09:34:32      Final result by Interface, Lab In [facility] (02/20/22 09:34:32)                   Narrative:    Test Reason :     Vent. Rate : 118 BPM     Atrial Rate : 117 BPM     P-R Int : 000 ms          QRS Dur : 118 ms      QT Int : 374 ms       P-R-T Axes : 000 056 -21 degrees     QTc Int : 524 ms    Accelerated Junctional rhythm vs SVT with artifact  Nonspecific intraventricular conduction delay  ST and T wave abnormality, consider inferior ischemia  Prolonged QT  Abnormal ECG  When compared with ECG of 27-JAN-2022 00:52,  Rhythm changed  Vent. rate has increased BY  50 BPM  Questionable change in QRS duration  Confirmed by Lencho BARROS MD ([id number]) on 2/20/2022 9:34:22 AM    Referred By: GAGAN   SELF           Confirmed By:Lecnho BARROS MD                                    Results for orders placed during the hospital encounter of 01/25/22    Echo    Interpretation Summary  · There is abnormal septal wall motion.  · The left ventricle is normal in size with low normal systolic function.  · The estimated ejection fraction is 50%.  · Normal left ventricular diastolic function.  · Mild left atrial enlargement.  · Normal right ventricular size with normal right ventricular systolic function.  · Mild mitral regurgitation.  · There are segmental left ventricular wall motion abnormalities.  · Mild tricuspid regurgitation.  · Elevated central venous pressure (15 mmHg).      X-Ray Chest AP Portable  Narrative: EXAMINATION:  XR CHEST AP PORTABLE    CLINICAL HISTORY:  Sob, cough;    TECHNIQUE:  Single frontal view of the chest was performed.    COMPARISON:  Chest radiograph February 19, 2022    FINDINGS:  AP portable chest radiographic examination is submitted.  When accounting for difference in position and technique the appearance of the cardiomediastinal  silhouette is thought stable.    There appears to be mild prominence of the central pulmonary vascular, there is bilateral pattern of pulmonary opacity consistent with interstitial and ground-glass and patchy alveolar infiltrates, this appears superimposed on chronic change.  There is no evidence for large pleural effusion there is minimal blunting at the right costophrenic angle that may relate to a small amount of pleural fluid.  There is no evidence for pneumothorax.  The osseous structures demonstrate chronic change.  Impression: Mild prominence of the central pulmonary vascular, there is appearance of bilateral opacity consistent with interstitial and ground-glass and patchy alveolar infiltrates.    Electronically signed by: Frantz Lopez  Date:    02/25/2022  Time:    22:03      All imaging within the last 24 hours was reviewed.       Discharge Planning   ALLIE: 3/9/2022     Code Status: DNR   Is the patient medically ready for discharge?: No    Reason for patient still in hospital (select all that apply): Patient trending condition, Laboratory test, Treatment, and Pending disposition  Discharge Plan A: Rehab   Discharge Delays: None known at this time        Assessment/Plan:      * Encephalopathy, metabolic  In the setting of DKA and sepsis upon admission.  Admit CT Head without any concerning new findings- no new infarct as discussed with radiology  Resolved.      Pressure injury of buttock, unstageable  Seen by wound care with triad started      Severe sepsis  Upon admission:  Organ dysfunction indicated by Acute kidney injury, Encephalopathy  and Acute respiratory failure  Source- Unclear. CXR with diffuse interstitial opacities, however no consolidation noted. UA with pyuria, without elevated leuks. Blood cultures pending. Possibly due to sacral wound, although doesn't look grossly infected.     Started on broad spectrum abx at admission with vanc/zosyn/doxy.    - vancomycin discontinued today as no MRSA  has isolated.  No infectious source found but leukocytosis persists.  Consider de-escalation further based on continued improvement.  Day 3 abx today.    F/u Urine and blood cultures  - Wound care consulted for sacral decubitus wound. Appreciate recs.  -lactic acid overall trend improved    Acute hypoxemic respiratory failure  In setting of COVID infection. Resolved upon step-down from MICU. ORA.   maintain SpO2 88-92% given hx of COPD  -wean oxygen as tolerated      Palliative care status  Per MICU: patient wishes to be DNR/DNI and family agrees. Still want to continue full medical care     COVID-19 virus infection  Viral Pneumonia due to COVID-19  - COVID-19 testing:   -Patient is identified as High risk for severe complications of COVID 19 based on COVID risk score of 8   Initiate standard COVID protocols; COVID-19 testing Collection Date: 8/9/2021 Collection Time:   3:41 PM ,Infection Control notification  and isolation- respiratory, contact and droplet per protocol  COVID vaccinated with booster.   Received 1 dose of sotrovimab infusion 02/18/2022  - Diagnostics: Trend LDH, CRP, Ferritin, D-dimer Q48hrs if stable, more frequently if patient decompensating.     Lymphopenia, hyponatremia, hyperferritinemia, elevated troponin, elevated d-dimer, age, and medical comorbidities are significant predictors of poor clinical outcome     - Management: Per [facility] COVID Treatment Protocol (4/15/20)       - Monitoring:          - Telemetry & Continuous Pulse Oximetry       - Targeted therapy Remdesivir, 200mg IV x1, followed by 100mg IV daily x5 days total,  - Holding dexamethasone PO/IV 6mg daily x10 days in the setting of DKA/HHS.-  -Weaning 02 as tolerated  - Anticoagulation, Patient admitted to critical care up initial presentation and on triple therapy- Will continue home apixaban dose anticoagulation      - Antibiotics:  - if indications, CXR findings, elevated procal. See protocol for alternatives.   - Discontinue  early if low concern for bacterial co-infection          - Initiated on IV vanc, zosyn and doxycycline (unable to use azithromycin due to prolong QTc)     - Nutrition:           - Multivitamin PO daily          - Add Boost supplement          - Vitamin D 1000IU daily if deficient          - Ascorbic acid 500mg PO bid    - Supportive Care:          - acetaminophen 650mg PO Q6hr PRN fever/headache          - loperamide PRN viral diarrhea    Diabetic ketoacidosis with coma associated with type 2 diabetes mellitus  Patient with uncontrolled DM presenting with metabolic encephalopathy in the setting of DKA with coma. Suspect patient developed DKA in the setting of sepsis/ COVID-10   Glucose 1109, pH 7.17, Co2 15.6 HCO3 <5, AG unable to calculate  DKA protocol completed and DKA resolved in MICU and Pt stepped down on subq insulin.  Hypoglycemia episode upon step-down, detemir adjusted from BID to qhs per home long acting insulin and due to episode of hypoglycemia   Continue aspart TIDWM  Diabetic diet  POCT BGx4  Cotinue to titrate short and long acting insulin needs as indicated to in-pt hospital goal 140-180    Focal seizures  Continue home lancosamide     BRENDAN (acute kidney injury)  Estimated Creatinine Clearance: 65.8 mL/min (based on SCr of 1 mg/dL).  sCr maxed at 3.1, baseline 1.1-1.3. Likely prerenal in the setting of DKA  Improved with treatment of DKA  - Avoid nephrotoxins, renally dose meds    Paroxysmal atrial fibrillation  History of paroxysmal atrial fibrillation on home  Metoprolol XL 50 mg daily, diltiazem  mg daily and amiodarone 200 mg daily.  - Continue po apixaban  - Continue amiodarone  - metoprolol and diltiazem held in MICU  in the setting of sepsis.   -resumed BB and CBB with titration as tolerated per BP          Embolic stroke involving right middle cerebral artery  Hx of CVA in Jan 2022. CT Head with evolving infarcts in right frontal and left cerebellar hemispheres. No concern for acute  stroke this admit per discussion with radiology.   - Continue home antiplatelets, statin and eliquis       Coronary artery disease involving native coronary artery of native heart with unstable angina pectoris  Hx of CAD s/p placement of 2 LAUREN in RCA in 01/09/2022.   - Continue home ASA, plavix and statin    Pulmonary nodules  Has stable bilateral  pulmonary masses which could be malignancy per outpatient pulmonology notes. No pathology report as none of the nodules appeared to be safe for biopsy given high risk of PTX is high with many adjacent bulla.         Chronic pulmonary heart disease  Follows up with Pulm clinic in [place]. Last seen in December and was evaluated for pulmonary masses. Deemed to have mild COPD per notes. Not on any maintenance therapy.  - Continue albuterol inhaler     Type 2 diabetes mellitus with hyperglycemia, with long-term current use of insulin  See DKA  Glucoses remain erratic; Endocrine consulted    Hypertension associated with diabetes  resume home antihypertensives as tolerated  Held losartan in setting of BRENDAN  See pAF      VTE Risk Mitigation (From admission, onward)         Ordered     apixaban tablet 5 mg  2 times daily         02/19/22 2202     IP VTE HIGH RISK PATIENT  Once         02/19/22 2144     Place sequential compression device  Until discontinued         02/19/22 2144                I have assessed these findings virtually using a telemed platform and with assistance of the bedside nurse.        The attending portion of this evaluation, treatment, and documentation was performed per Komal Huynh MD via Telemedicine AudioVisual using the secure [employer] software platform with 2 way audio/video. The provider was located off-site and the patient is located in the hospital. The aforementioned video software was utilized to document the relevant history and physical exam    Komal Huynh MD  Department of Hospital Medicine   [facility] - Intensive Care ([place]-[address])

## 2022-03-02 NOTE — PLAN OF CARE
Problem: Adult Inpatient Plan of Care  Goal: Plan of Care Review  Outcome: Ongoing, Progressing  Goal: Absence of Hospital-Acquired Illness or Injury  Outcome: Ongoing, Progressing  Goal: Optimal Comfort and Wellbeing  Outcome: Ongoing, Progressing     Problem: Impaired Wound Healing  Goal: Optimal Wound Healing  Outcome: Ongoing, Progressing   Pt AAOx4, VSS. No significant changes during shift. No s/s of distress. Pt in bed in lowest position, sr up x2, personal effects/call light within reach. Will cont to monitor.

## 2022-03-02 NOTE — SUBJECTIVE & OBJECTIVE
"Interval HPI:   Overnight events: No acute events overnight. Patient on the IMTA Unit in room 64498/68354 A. Blood glucose stable. BG at, above, and below goal on current insulin regimen (SSI, prandial, and basal insulin ). Steroid use- None.    Renal function- Normal   Vasopressors-  None       Diet diabetic [facility];  Calorie     Eatin%  Nausea: No  Hypoglycemia and intervention: No  Fever: No  TPN and/or TF: No    /66 (BP Location: Right arm, Patient Position: Lying)   Pulse 72   Temp 98.3 °F (36.8 °C) (Oral)   Resp 20   Ht 6' 2" (1.88 m)   Wt 76.4 kg (168 lb 6.9 oz)   SpO2 98%   BMI 21.63 kg/m²     Labs Reviewed and Include    Recent Labs   Lab 22  0311   GLU 85   CALCIUM 8.0*   ALBUMIN 1.7*   PROT 5.7*      K 3.7   CO2 24      BUN 19   CREATININE 0.8   ALKPHOS 90   ALT 8*   AST 17   BILITOT 0.4     Lab Results   Component Value Date    WBC 8.22 2022    HGB 11.3 (L) 2022    HCT 35.5 (L) 2022    MCV 88 2022     2022     No results for input(s): TSH, FREET4 in the last 168 hours.  Lab Results   Component Value Date    HGBA1C 11.5 (H) 2022       Nutritional status:   Body mass index is 21.63 kg/m².  Lab Results   Component Value Date    ALBUMIN 1.7 (L) 2022    ALBUMIN 1.7 (L) 2022    ALBUMIN 1.8 (L) 2022     No results found for: PREALBUMIN    Estimated Creatinine Clearance: 82.2 mL/min (based on SCr of 0.8 mg/dL).    Accu-Checks  Recent Labs     22  1612 22  2122 22  0732 22  1211 22  1531 22  2158 22  2353 22  0504 22  0745 22  1119   POCTGLUCOSE 245* 321* 139* 155* 168* 116* 114* 84 98 304*       Current Medications and/or Treatments Impacting Glycemic Control  Immunotherapy:    Immunosuppressants       None          Steroids:   Hormones (From admission, onward)                Start     Stop Route Frequency Ordered    22 1824  melatonin " tablet 6 mg         -- Oral Nightly PRN 02/23/22 1724          Pressors:    Autonomic Drugs (From admission, onward)                None          Hyperglycemia/Diabetes Medications:   Antihyperglycemics (From admission, onward)                Start     Stop Route Frequency Ordered    03/02/22 2100  insulin detemir U-100 pen 11 Units         -- SubQ Nightly 03/02/22 0856    03/02/22 0958  insulin aspart U-100 pen 0-5 Units         -- SubQ Before meals & nightly PRN 03/02/22 0858    03/01/22 1130  insulin aspart U-100 pen 5 Units         -- SubQ 3 times daily with meals 03/01/22 1019

## 2022-03-02 NOTE — PROGRESS NOTES
[facility] ([place] [address])  Endocrinology  Progress Note    Admit Date: 2/19/2022     Reason for Consult: Management of T2DM, Hyperglycemia     Diabetes diagnosis year: 2010    Home Diabetes Medications:  Novolog 9 TIDWM + SSI; Levemir 20 units qd; Metformin 1000 mg BID    How often checking glucose at home? 1-3 x day   BG readings on regimen: 100-150's  Hypoglycemia on the regimen?  No  Missed doses on regimen?  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 History was not obtained from patient due to encephalopathy. Patient's daughter at bedside reports the patient was recently in the ED on 2/17 for a mechanical fall after being discharged from rehab the same day. CTH and cervical spine revealed  evolving late subacute infarct involving the right frontal lobe with questionable petechial hemorrhage. Vascular neurology evaluated the patient and cleared him for discharge from without any new interventions. He was also tested positive for COVID-19 and was discharged yesterday from the ED. He subsequently received one dose of sotrovimab infusion for COVID and was in a normal state of health until this morning when his daughter found him lethargic and barely responsive prompting her to bring him to the ED. She reports the patient had no complaints prior to developing his AMS. Of note, the patient was recently admitted to [facility] from 01/25 through 02/13 for AMS concerning for CVA, however he was treated for metabolic encephalopathy 2/2 to DKA/HHS, sepsis and BRENDAN. Upon arrival to the ED, he was placed on 6L NC,  "normotensive and tachycardic. Lactic 11.5, WBC 17.8, Glucose 1109, pH 7.17, Co2 15.6 HCO3 <5, AG unable to calculate. sCr 3.1. CXR with intersitial opacities. He received ~4L of IVF, vancomycin, zosyn, initiated on insulin shifted for hyperkalemia and calcium for cardioprotection. ECG without noticeable ischemic changes. CTH without new changes- discussed findings with radiology. Patient was admitted to the MICU for further management. Endocrine consulted to manage type 2 diabetes and hyperglycemia. Since admission patient has had fluctuations in BG control.             Interval HPI:   Overnight events: No acute events overnight. Patient on the IMTA Unit in room 16758/88218 A. Blood glucose stable. BG at, above, and below goal on current insulin regimen (SSI, prandial, and basal insulin ). Steroid use- None.    Renal function- Normal   Vasopressors-  None       Diet diabetic [facility];  Calorie     Eatin%  Nausea: No  Hypoglycemia and intervention: No  Fever: No  TPN and/or TF: No    /66 (BP Location: Right arm, Patient Position: Lying)   Pulse 72   Temp 98.3 °F (36.8 °C) (Oral)   Resp 20   Ht 6' 2" (1.88 m)   Wt 76.4 kg (168 lb 6.9 oz)   SpO2 98%   BMI 21.63 kg/m²     Labs Reviewed and Include    Recent Labs   Lab 22  0311   GLU 85   CALCIUM 8.0*   ALBUMIN 1.7*   PROT 5.7*      K 3.7   CO2 24      BUN 19   CREATININE 0.8   ALKPHOS 90   ALT 8*   AST 17   BILITOT 0.4     Lab Results   Component Value Date    WBC 8.22 2022    HGB 11.3 (L) 2022    HCT 35.5 (L) 2022    MCV 88 2022     2022     No results for input(s): TSH, FREET4 in the last 168 hours.  Lab Results   Component Value Date    HGBA1C 11.5 (H) 2022       Nutritional status:   Body mass index is 21.63 kg/m².  Lab Results   Component Value Date    ALBUMIN 1.7 (L) 2022    ALBUMIN 1.7 (L) 2022    ALBUMIN 1.8 (L) 2022     No results found for: " PREALBUMIN    Estimated Creatinine Clearance: 82.2 mL/min (based on SCr of 0.8 mg/dL).    Accu-Checks  Recent Labs     02/28/22  1612 02/28/22  2122 03/01/22  0732 03/01/22  1211 03/01/22  1531 03/01/22  2158 03/01/22  2353 03/02/22  0504 03/02/22  0745 03/02/22  1119   POCTGLUCOSE 245* 321* 139* 155* 168* 116* 114* 84 98 304*       Current Medications and/or Treatments Impacting Glycemic Control  Immunotherapy:    Immunosuppressants       None          Steroids:   Hormones (From admission, onward)                Start     Stop Route Frequency Ordered    02/23/22 1824  melatonin tablet 6 mg         -- Oral Nightly PRN 02/23/22 1724          Pressors:    Autonomic Drugs (From admission, onward)                None          Hyperglycemia/Diabetes Medications:   Antihyperglycemics (From admission, onward)                Start     Stop Route Frequency Ordered    03/02/22 2100  insulin detemir U-100 pen 11 Units         -- SubQ Nightly 03/02/22 0856    03/02/22 0958  insulin aspart U-100 pen 0-5 Units         -- SubQ Before meals & nightly PRN 03/02/22 0858    03/01/22 1130  insulin aspart U-100 pen 5 Units         -- SubQ 3 times daily with meals 03/01/22 1019            ASSESSMENT and PLAN    * Encephalopathy, metabolic    Managed per primary team  Avoid hypoglycemia      Type 2 diabetes mellitus with hyperglycemia, with long-term current use of insulin  Endocrinology consulted for BG management.   BG goal 140-180    30% reduction in home dosing.     - Levemir Flex Pen 11 units nightly (20% reduction due to FBG below goal).   - Novolog (aspart) insulin 5 Units SQ TIDWM and prn for BG excursions MDC SSI (150/25).   - BG checks AC/HS      ** Please notify Endocrine for any change and/or advance in diet**  ** Please call Endocrine for any BG related issues **    Discharge Planning:   TBD. Please notify endocrinology prior to discharge.        Diabetic ketoacidosis with coma associated with type 2 diabetes mellitus  Now  resolved.     Will continue to monitor in the event of BG excursions.       COVID-19 virus infection  Infection may elevate BG readings  Managed per primary team               Cresencio Duke, LORIE, FNP  Endocrinology  [facility] ([place])

## 2022-03-02 NOTE — PHYSICIAN QUERY
PT Name: Kamar Muñoz  MR #: [id number]     DOCUMENTATION CLARIFICATION      CDS/: Aric Lynn RN, CCDS       Contact information:   [email or web address]      This form is a permanent document in the medical record.     Query Date: March 2, 2022    By submitting this query, we are merely seeking further clarification of documentation.  Please utilize your independent clinical judgment when addressing the question(s) below.     The Medical Record contains the following:    Clinical Information Location in Medical Record   2/20 H&P, CCM:  treated for metabolic encephalopathy 2/2 to DKA/HHS, sepsis and BRENDAN.   COVID-19 pneumonia   Severe sepsis:   This patient does have evidence of infective focus;Antibiotics:  vanc/zosyn/doxy  --Source- Unclear. CXR with diffuse interstitial opacities, however no consolidation noted. UA with pyuria, without elevated leuks  -- Wound care consulted for sacral decubitus wound.     2/21 CCM:  He received ~4L of IVF, vancomycin, zosyn, initiated on insulin shifted for hyperkalemia and calcium for cardioprotection.  --Severe sepsis:   - vancomycin discontinued today as no MRSA has isolated.  No infectious source found but leukocytosis persists.    Critical care medicine  (CCM)                    CCM   2/22 HM: In the setting of DKA and sepsis upon admission.  Suspect patient developed DKA in the   setting of sepsis/ COVID-10    2/28 HM: Severe sepsis: - Wound care consulted for sacral decubitus wound. Appreciate recs.   Hospital medicine ()           Pressure injury of buttock, unstageable  - Unstageable pressure injuries noted to left buttock and gluteal cleft. Wound care : Ashleigh Tesfaye NP     Please document your best medical opinion regarding the etiology of _sepsis _?       [  xx ] Associated with Covid pneumonia infection   [   ] Other etiology (please specify):___________________   [  ] Clinically Undetermined       Please document in your progress notes  daily for the duration of treatment, until resolved, and include in your discharge summary.

## 2022-03-03 LAB
ALBUMIN SERPL BCP-MCNC: 1.9 G/DL (ref 3.5–5.2)
ALP SERPL-CCNC: 102 U/L (ref 55–135)
ALT SERPL W/O P-5'-P-CCNC: 11 U/L (ref 10–44)
ANION GAP SERPL CALC-SCNC: 8 MMOL/L (ref 8–16)
AST SERPL-CCNC: 26 U/L (ref 10–40)
BASOPHILS # BLD AUTO: 0.09 K/UL (ref 0–0.2)
BASOPHILS NFR BLD: 0.9 % (ref 0–1.9)
BILIRUB SERPL-MCNC: 0.5 MG/DL (ref 0.1–1)
BUN SERPL-MCNC: 17 MG/DL (ref 8–23)
CALCIUM SERPL-MCNC: 8.1 MG/DL (ref 8.7–10.5)
CHLORIDE SERPL-SCNC: 104 MMOL/L (ref 95–110)
CO2 SERPL-SCNC: 24 MMOL/L (ref 23–29)
CREAT SERPL-MCNC: 0.7 MG/DL (ref 0.5–1.4)
D DIMER PPP IA.FEU-MCNC: 0.5 MG/L FEU
DIFFERENTIAL METHOD: ABNORMAL
EOSINOPHIL # BLD AUTO: 0.5 K/UL (ref 0–0.5)
EOSINOPHIL NFR BLD: 4.6 % (ref 0–8)
ERYTHROCYTE [DISTWIDTH] IN BLOOD BY AUTOMATED COUNT: 15.6 % (ref 11.5–14.5)
EST. GFR  (AFRICAN AMERICAN): >60 ML/MIN/1.73 M^2
EST. GFR  (NON AFRICAN AMERICAN): >60 ML/MIN/1.73 M^2
FERRITIN SERPL-MCNC: 300 NG/ML (ref 20–300)
GLUCOSE SERPL-MCNC: 38 MG/DL (ref 70–110)
HCT VFR BLD AUTO: 36.6 % (ref 40–54)
HGB BLD-MCNC: 11.8 G/DL (ref 14–18)
IMM GRANULOCYTES # BLD AUTO: 0.03 K/UL (ref 0–0.04)
IMM GRANULOCYTES NFR BLD AUTO: 0.3 % (ref 0–0.5)
LDH SERPL L TO P-CCNC: 175 U/L (ref 110–260)
LYMPHOCYTES # BLD AUTO: 2.3 K/UL (ref 1–4.8)
LYMPHOCYTES NFR BLD: 24.1 % (ref 18–48)
MAGNESIUM SERPL-MCNC: 1.6 MG/DL (ref 1.6–2.6)
MCH RBC QN AUTO: 27.6 PG (ref 27–31)
MCHC RBC AUTO-ENTMCNC: 32.2 G/DL (ref 32–36)
MCV RBC AUTO: 86 FL (ref 82–98)
MONOCYTES # BLD AUTO: 0.9 K/UL (ref 0.3–1)
MONOCYTES NFR BLD: 9 % (ref 4–15)
NEUTROPHILS # BLD AUTO: 5.9 K/UL (ref 1.8–7.7)
NEUTROPHILS NFR BLD: 61.1 % (ref 38–73)
NRBC BLD-RTO: 0 /100 WBC
PHOSPHATE SERPL-MCNC: 2.5 MG/DL (ref 2.7–4.5)
PLATELET # BLD AUTO: 260 K/UL (ref 150–450)
PMV BLD AUTO: 8.9 FL (ref 9.2–12.9)
POCT GLUCOSE: 141 MG/DL (ref 70–110)
POCT GLUCOSE: 256 MG/DL (ref 70–110)
POCT GLUCOSE: 364 MG/DL (ref 70–110)
POCT GLUCOSE: 40 MG/DL (ref 70–110)
POCT GLUCOSE: 476 MG/DL (ref 70–110)
POCT GLUCOSE: 54 MG/DL (ref 70–110)
POCT GLUCOSE: 84 MG/DL (ref 70–110)
POTASSIUM SERPL-SCNC: 3.9 MMOL/L (ref 3.5–5.1)
PROT SERPL-MCNC: 6.1 G/DL (ref 6–8.4)
RBC # BLD AUTO: 4.27 M/UL (ref 4.6–6.2)
SODIUM SERPL-SCNC: 136 MMOL/L (ref 136–145)
WBC # BLD AUTO: 9.72 K/UL (ref 3.9–12.7)

## 2022-03-03 PROCEDURE — 25000003 PHARM REV CODE 250: Mod: HCNC | Performed by: STUDENT IN AN ORGANIZED HEALTH CARE EDUCATION/TRAINING PROGRAM

## 2022-03-03 PROCEDURE — 97530 THERAPEUTIC ACTIVITIES: CPT | Mod: HCNC

## 2022-03-03 PROCEDURE — 99233 PR SUBSEQUENT HOSPITAL CARE,LEVL III: ICD-10-PCS | Mod: HCNC,95,CR, | Performed by: INTERNAL MEDICINE

## 2022-03-03 PROCEDURE — 12000002 HC ACUTE/MED SURGE SEMI-PRIVATE ROOM: Mod: HCNC

## 2022-03-03 PROCEDURE — 97112 NEUROMUSCULAR REEDUCATION: CPT | Mod: HCNC

## 2022-03-03 PROCEDURE — 99233 SBSQ HOSP IP/OBS HIGH 50: CPT | Mod: HCNC,95,CR, | Performed by: INTERNAL MEDICINE

## 2022-03-03 PROCEDURE — 27000207 HC ISOLATION: Mod: HCNC

## 2022-03-03 PROCEDURE — 99232 SBSQ HOSP IP/OBS MODERATE 35: CPT | Mod: HCNC,,, | Performed by: NURSE PRACTITIONER

## 2022-03-03 PROCEDURE — 85379 FIBRIN DEGRADATION QUANT: CPT | Mod: HCNC | Performed by: STUDENT IN AN ORGANIZED HEALTH CARE EDUCATION/TRAINING PROGRAM

## 2022-03-03 PROCEDURE — 25000003 PHARM REV CODE 250: Mod: HCNC | Performed by: INTERNAL MEDICINE

## 2022-03-03 PROCEDURE — 36415 COLL VENOUS BLD VENIPUNCTURE: CPT | Mod: HCNC | Performed by: STUDENT IN AN ORGANIZED HEALTH CARE EDUCATION/TRAINING PROGRAM

## 2022-03-03 PROCEDURE — 80053 COMPREHEN METABOLIC PANEL: CPT | Mod: HCNC | Performed by: STUDENT IN AN ORGANIZED HEALTH CARE EDUCATION/TRAINING PROGRAM

## 2022-03-03 PROCEDURE — 83615 LACTATE (LD) (LDH) ENZYME: CPT | Mod: HCNC | Performed by: STUDENT IN AN ORGANIZED HEALTH CARE EDUCATION/TRAINING PROGRAM

## 2022-03-03 PROCEDURE — 82728 ASSAY OF FERRITIN: CPT | Mod: HCNC | Performed by: STUDENT IN AN ORGANIZED HEALTH CARE EDUCATION/TRAINING PROGRAM

## 2022-03-03 PROCEDURE — 83735 ASSAY OF MAGNESIUM: CPT | Mod: HCNC | Performed by: STUDENT IN AN ORGANIZED HEALTH CARE EDUCATION/TRAINING PROGRAM

## 2022-03-03 PROCEDURE — 63600175 PHARM REV CODE 636 W HCPCS: Mod: HCNC | Performed by: NURSE PRACTITIONER

## 2022-03-03 PROCEDURE — 84100 ASSAY OF PHOSPHORUS: CPT | Mod: HCNC | Performed by: STUDENT IN AN ORGANIZED HEALTH CARE EDUCATION/TRAINING PROGRAM

## 2022-03-03 PROCEDURE — 85025 COMPLETE CBC W/AUTO DIFF WBC: CPT | Mod: HCNC | Performed by: STUDENT IN AN ORGANIZED HEALTH CARE EDUCATION/TRAINING PROGRAM

## 2022-03-03 PROCEDURE — 99232 PR SUBSEQUENT HOSPITAL CARE,LEVL II: ICD-10-PCS | Mod: HCNC,,, | Performed by: NURSE PRACTITIONER

## 2022-03-03 RX ORDER — INSULIN ASPART 100 [IU]/ML
0-5 INJECTION, SOLUTION INTRAVENOUS; SUBCUTANEOUS
Status: DISCONTINUED | OUTPATIENT
Start: 2022-03-03 | End: 2022-03-05

## 2022-03-03 RX ADMIN — INSULIN ASPART 5 UNITS: 100 INJECTION, SOLUTION INTRAVENOUS; SUBCUTANEOUS at 06:03

## 2022-03-03 RX ADMIN — APIXABAN 5 MG: 5 TABLET, FILM COATED ORAL at 08:03

## 2022-03-03 RX ADMIN — MULTIPLE VITAMINS W/ MINERALS TAB 1 TABLET: TAB at 08:03

## 2022-03-03 RX ADMIN — GABAPENTIN 200 MG: 100 CAPSULE ORAL at 10:03

## 2022-03-03 RX ADMIN — INSULIN ASPART 5 UNITS: 100 INJECTION, SOLUTION INTRAVENOUS; SUBCUTANEOUS at 12:03

## 2022-03-03 RX ADMIN — METOPROLOL TARTRATE 25 MG: 25 TABLET, FILM COATED ORAL at 10:03

## 2022-03-03 RX ADMIN — LIDOCAINE 1 PATCH: 50 PATCH CUTANEOUS at 03:03

## 2022-03-03 RX ADMIN — OXYCODONE HYDROCHLORIDE AND ACETAMINOPHEN 500 MG: 500 TABLET ORAL at 08:03

## 2022-03-03 RX ADMIN — GABAPENTIN 200 MG: 100 CAPSULE ORAL at 03:03

## 2022-03-03 RX ADMIN — ATORVASTATIN CALCIUM 40 MG: 20 TABLET, FILM COATED ORAL at 08:03

## 2022-03-03 RX ADMIN — DICLOFENAC SODIUM 4 G: 10 GEL TOPICAL at 08:03

## 2022-03-03 RX ADMIN — LACOSAMIDE 100 MG: 100 TABLET, FILM COATED ORAL at 08:03

## 2022-03-03 RX ADMIN — ACETAMINOPHEN 650 MG: 325 TABLET ORAL at 03:03

## 2022-03-03 RX ADMIN — CALCIUM CARBONATE (ANTACID) CHEW TAB 500 MG 500 MG: 500 CHEW TAB at 10:03

## 2022-03-03 RX ADMIN — DICLOFENAC SODIUM 4 G: 10 GEL TOPICAL at 10:03

## 2022-03-03 RX ADMIN — PANTOPRAZOLE SODIUM 40 MG: 40 TABLET, DELAYED RELEASE ORAL at 08:03

## 2022-03-03 RX ADMIN — ACETAMINOPHEN 650 MG: 325 TABLET ORAL at 08:03

## 2022-03-03 RX ADMIN — DICLOFENAC SODIUM 4 G: 10 GEL TOPICAL at 12:03

## 2022-03-03 RX ADMIN — CLOPIDOGREL 75 MG: 75 TABLET, FILM COATED ORAL at 08:03

## 2022-03-03 RX ADMIN — INSULIN ASPART 1 UNITS: 100 INJECTION, SOLUTION INTRAVENOUS; SUBCUTANEOUS at 10:03

## 2022-03-03 RX ADMIN — Medication 6 MG: at 10:03

## 2022-03-03 RX ADMIN — APIXABAN 5 MG: 5 TABLET, FILM COATED ORAL at 10:03

## 2022-03-03 RX ADMIN — OXYCODONE HYDROCHLORIDE AND ACETAMINOPHEN 500 MG: 500 TABLET ORAL at 10:03

## 2022-03-03 RX ADMIN — GABAPENTIN 200 MG: 100 CAPSULE ORAL at 08:03

## 2022-03-03 RX ADMIN — ACETAMINOPHEN 650 MG: 325 TABLET ORAL at 10:03

## 2022-03-03 RX ADMIN — OXYCODONE 5 MG: 5 TABLET ORAL at 10:03

## 2022-03-03 RX ADMIN — ASPIRIN 81 MG: 81 TABLET, COATED ORAL at 08:03

## 2022-03-03 RX ADMIN — AMIODARONE HYDROCHLORIDE 200 MG: 200 TABLET ORAL at 08:03

## 2022-03-03 RX ADMIN — Medication 24 G: at 05:03

## 2022-03-03 RX ADMIN — LACOSAMIDE 100 MG: 100 TABLET, FILM COATED ORAL at 10:03

## 2022-03-03 RX ADMIN — METOPROLOL TARTRATE 25 MG: 25 TABLET, FILM COATED ORAL at 08:03

## 2022-03-03 RX ADMIN — DICLOFENAC SODIUM 4 G: 10 GEL TOPICAL at 06:03

## 2022-03-03 RX ADMIN — INSULIN ASPART 5 UNITS: 100 INJECTION, SOLUTION INTRAVENOUS; SUBCUTANEOUS at 07:03

## 2022-03-03 RX ADMIN — PANTOPRAZOLE SODIUM 40 MG: 40 TABLET, DELAYED RELEASE ORAL at 10:03

## 2022-03-03 NOTE — PROGRESS NOTES
[facility] - [facility] ([place] [address])  [facility] Medicine  Telemedicine Progress Note    Patient Name: Kamar Muñoz  MRN: [id number]  Patient Class: IP- Inpatient   Admission Date: 2/19/2022  Length of Stay: 11 days  Attending Physician: Komal Huynh MD  Primary Care Provider: Basim Guerrero MD          Subjective:     Principal Problem:Encephalopathy, metabolic        HPI:  Kamar Muñoz is a 76 year old male with past medical history of CAD s/p 2 LAUREN in RCA (Jan 2022), HTN, HLD, p. A. 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 History was not obtained from patient due to encephalopathy. Patient's daughter at bedside reports the patient was recently in the ED on 2/17 for a mechanical fall after being discharged from rehab the same day. CTH and cervical spine revealed  evolving late subacute infarct involving the right frontal lobe with questionable petechial hemorrhage. Vascular neurology evaluated the patient and cleared him for discharge from without any new interventions. He was also tested positive for COVID-19 and was discharged yesterday from the ED. He subsequently received one dose of sotrovimab infusion for COVID and was in a normal state of health until this morning when his daughter found him lethargic and barely responsive prompting her to bring him to the ED. She reports the patient had no complaints prior to developing his AMS. Of note, the patient was recently admitted to [facility] [place] from 01/25 through 02/13 for AMS concerning for CVA, however  he was treated for metabolic encephalopathy 2/2 to DKA/HHS, sepsis and BRENDAN.      Upon arrival to the ED, he was placed on 6L NC, normotensive and tachycardic. Lactic 11.5, WBC 17.8, Glucose 1109, pH 7.17, Co2 15.6 HCO3 <5, AG unable to calculate. sCr 3.1. CXR with intersitial opacities. He received ~4L of IVF, vancomycin, zosyn, initiated on insulin shifted for hyperkalemia and calcium for cardioprotection. ECG without noticeable ischemic changes. CTH without new changes- discussed findings with radiology. Patient was admitted to the MICU for further management.        Overview/Hospital Course:  ICU Course:  Placed on remdesivir and broad spectrum IV abx in addition to insulin per DKA protocol. In MICU: Weaned supp O2 to room air; Transitioned to subq insulin; Improvement of AMS. BRENDAN improving gradually. Pt had discussion w/ family in MICU and transitioned from full code to DNR/DNI.  Step down to  initiated 2/21.    Hospital Medicine Course:  Pt w/ episode of CP and hypoglycemia upon stepdown. Ischemic work-up largely unremarkable with trop down trending from admission. Detemir dosing adjusted from BID to qhs. He had additional hypoglycemic episode upon re-uptitration of long-acting insulin from 12 to 15. Dosing decreased to 13u as patient was hyperglycemic to 230s w/12u qhs. Worsening hyperglycemia to 270s- insulin dose modified to 3u TID wm, and detemir 14u qhs.    Episode of abdominal pain and diarrhea 02/25- appeared to be 2/2 antibiotic use. Antibiotics stopped 02/25. Pt w/persistent diarrhea- c diff studies sent- loperamide stopped.     Pt was found to have St 3 sacral pressure ulcer.         This encounter was provided through telemedicine.  Patient was transferred to the telemedicine service on:  02/28/2022   The patient location is: [id number] A admitted 2/19/2022  6:57 PM.  Present with the patient at the time of the telemed/virtual assessment: Family member    Interval History/Overnight Events:      Continues to eat more if able to pick foods - he continues to complain of diarrhea but nurse states no BM today - no abd pain or nausea. Remains with fatigue - denies dyspnea - worked with PT but not OT due to abd discomfort which he does not recall; previous pain to buttocks/ feet denied today      Review of Systems   Constitutional:  Positive for activity change, appetite change and fatigue. Negative for fever.   Respiratory:  Negative for cough and shortness of breath.    Gastrointestinal:  Positive for diarrhea. Negative for vomiting.   Musculoskeletal:  Positive for arthralgias and myalgias.   Neurological:  Positive for weakness (Generalized).   Psychiatric/Behavioral:  Positive for dysphoric mood.       Inpatient Medications:  Scheduled Meds:   acetaminophen  650 mg Oral TID    amiodarone  200 mg Oral Daily    apixaban  5 mg Oral BID    ascorbic acid (vitamin C)  500 mg Oral BID    aspirin  81 mg Oral Daily    atorvastatin  40 mg Oral Daily    clopidogreL  75 mg Oral Daily    diclofenac sodium  4 g Topical (Top) QID    gabapentin  200 mg Oral TID    insulin aspart U-100  5 Units Subcutaneous TIDWM    insulin detemir U-100  11 Units Subcutaneous QHS    lacosamide  100 mg Oral Q12H    LIDOcaine  1 patch Transdermal Q24H    metoprolol tartrate  25 mg Oral BID    multivitamin  1 tablet Oral Daily    pantoprazole  40 mg Oral BID     Continuous Infusions:  PRN Meds:.acetaminophen, albuterol **AND** MDI Q4H, calcium carbonate, dextrose 10%, dextrose 10%, glucagon (human recombinant), glucose, glucose, insulin aspart U-100, loperamide, melatonin, ondansetron, oxyCODONE, sodium chloride 0.9%      Objective:     Temp:  [98.1 °F (36.7 °C)-98.9 °F (37.2 °C)] 98.9 °F (37.2 °C)  Pulse:  [61-81] 71  Resp:  [16-22] 22  SpO2:  [92 %-98 %] 96 %  BP: (101-133)/(58-70) 126/70      Intake/Output Summary (Last 24 hours) at 3/2/2022 4900  Last data filed at 3/2/2022 1300  Gross per 24 hour   Intake 340 ml    Output 300 ml   Net 40 ml          Body mass index is 21.63 kg/m².    Physical Exam  Vitals and nursing note reviewed.   Constitutional:       General: He is not in acute distress.     Appearance: He is normal weight. He is ill-appearing.   HENT:      Head: Normocephalic and atraumatic.      Right Ear: Hearing normal.      Left Ear: Hearing normal.      Nose: Nose normal.   Eyes:      General: No scleral icterus.        Right eye: No discharge.         Left eye: No discharge.      Extraocular Movements: Extraocular movements intact.   Cardiovascular:      Rate and Rhythm: Normal rate.   Pulmonary:      Effort: Pulmonary effort is normal. No accessory muscle usage or respiratory distress.   Skin:     Findings: No rash.   Neurological:      Mental Status: He is alert. Mental status is at baseline.      Cranial Nerves: No cranial nerve deficit.      Motor: Weakness (Generalized) present.   Psychiatric:         Attention and Perception: Attention normal.         Mood and Affect: Mood normal.         Speech: Speech normal.         Behavior: Behavior is cooperative.        Labs:  Recent Results (from the past 24 hour(s))   POCT glucose    Collection Time: 03/01/22 11:53 PM   Result Value Ref Range    POCT Glucose 114 (H) 70 - 110 mg/dL   Comprehensive metabolic panel    Collection Time: 03/02/22  3:11 AM   Result Value Ref Range    Sodium 141 136 - 145 mmol/L    Potassium 3.7 3.5 - 5.1 mmol/L    Chloride 105 95 - 110 mmol/L    CO2 24 23 - 29 mmol/L    Glucose 85 70 - 110 mg/dL    BUN 19 8 - 23 mg/dL    Creatinine 0.8 0.5 - 1.4 mg/dL    Calcium 8.0 (L) 8.7 - 10.5 mg/dL    Total Protein 5.7 (L) 6.0 - 8.4 g/dL    Albumin 1.7 (L) 3.5 - 5.2 g/dL    Total Bilirubin 0.4 0.1 - 1.0 mg/dL    Alkaline Phosphatase 90 55 - 135 U/L    AST 17 10 - 40 U/L    ALT 8 (L) 10 - 44 U/L    Anion Gap 12 8 - 16 mmol/L    eGFR if African American >60.0 >60 mL/min/1.73 m^2    eGFR if non African American >60.0 >60 mL/min/1.73 m^2   Magnesium     Collection Time: 03/02/22  3:11 AM   Result Value Ref Range    Magnesium 1.6 1.6 - 2.6 mg/dL   CBC auto differential    Collection Time: 03/02/22  3:11 AM   Result Value Ref Range    WBC 8.22 3.90 - 12.70 K/uL    RBC 4.04 (L) 4.60 - 6.20 M/uL    Hemoglobin 11.3 (L) 14.0 - 18.0 g/dL    Hematocrit 35.5 (L) 40.0 - 54.0 %    MCV 88 82 - 98 fL    MCH 28.0 27.0 - 31.0 pg    MCHC 31.8 (L) 32.0 - 36.0 g/dL    RDW 15.3 (H) 11.5 - 14.5 %    Platelets 252 150 - 450 K/uL    MPV 9.7 9.2 - 12.9 fL    Immature Granulocytes 0.4 0.0 - 0.5 %    Gran # (ANC) 5.1 1.8 - 7.7 K/uL    Immature Grans (Abs) 0.03 0.00 - 0.04 K/uL    Lymph # 1.7 1.0 - 4.8 K/uL    Mono # 0.8 0.3 - 1.0 K/uL    Eos # 0.5 0.0 - 0.5 K/uL    Baso # 0.06 0.00 - 0.20 K/uL    nRBC 0 0 /100 WBC    Gran % 62.2 38.0 - 73.0 %    Lymph % 20.8 18.0 - 48.0 %    Mono % 9.5 4.0 - 15.0 %    Eosinophil % 6.4 0.0 - 8.0 %    Basophil % 0.7 0.0 - 1.9 %    Differential Method Automated    Phosphorus    Collection Time: 03/02/22  3:11 AM   Result Value Ref Range    Phosphorus 3.1 2.7 - 4.5 mg/dL   POCT glucose    Collection Time: 03/02/22  5:04 AM   Result Value Ref Range    POCT Glucose 84 70 - 110 mg/dL   POCT glucose    Collection Time: 03/02/22  7:45 AM   Result Value Ref Range    POCT Glucose 98 70 - 110 mg/dL   POCT glucose    Collection Time: 03/02/22 11:19 AM   Result Value Ref Range    POCT Glucose 304 (H) 70 - 110 mg/dL   POCT glucose    Collection Time: 03/02/22  3:26 PM   Result Value Ref Range    POCT Glucose 285 (H) 70 - 110 mg/dL   POCT glucose    Collection Time: 03/02/22  9:02 PM   Result Value Ref Range    POCT Glucose 105 70 - 110 mg/dL        Lab Results   Component Value Date    [id number] Positive (A) 02/17/2022       Recent Labs   Lab 02/28/22  0606 03/01/22 0257 03/02/22 0311   WBC 9.33 7.64 8.22   LYMPH 11.8*  1.1 16.9*  1.3 20.8  1.7   HGB 11.9* 11.2* 11.3*   HCT 36.8* 34.1* 35.5*    245 252       Recent Labs   Lab 02/28/22 0606 03/01/22 0257  03/02/22  0311    136 141   K 4.8 3.8 3.7    104 105   CO2 20* 22* 24   BUN 22 23 19   CREATININE 1.0 0.9 0.8   * 206* 85   CALCIUM 8.0* 7.8* 8.0*   MG 1.6 1.6 1.6   PHOS 2.8 2.8 3.1       Recent Labs   Lab 02/28/22  0606 03/01/22  0257 03/02/22  0311   ALKPHOS 109 95 90   ALT 9* 8* 8*   AST 20 11 17   ALBUMIN 1.8* 1.7* 1.7*   PROT 6.0 5.5* 5.7*   BILITOT 0.6 0.5 0.4          Recent Labs     03/01/22  2151   DDIMER 0.41   FERRITIN 274            All labs within the last 24 hours were reviewed.     Microbiology:  Microbiology Results (last 7 days)       Procedure Component Value Units Date/Time    Clostridium difficile EIA [[id number]] Collected: 02/28/22 1054    Order Status: Completed Specimen: Stool Updated: 03/01/22 0118     C. diff Antigen Negative     C difficile Toxins A+B, EIA Negative     Comment: Testing not recommended for children <24 months old.       Blood culture x two cultures. Draw prior to antibiotics. [[id number]] Collected: 02/19/22 1932    Order Status: Completed Specimen: Blood from Peripheral, Hand, Left Updated: 02/24/22 2212     Blood Culture, Routine No growth after 5 days.    Narrative:      Aerobic and anaerobic    Blood culture x two cultures. Draw prior to antibiotics. [[id number]] Collected: 02/19/22 1932    Order Status: Completed Specimen: Blood from Peripheral, Hand, Left Updated: 02/24/22 2212     Blood Culture, Routine No growth after 5 days.    Narrative:      Aerobic and anaerobic              Imaging  ECG Results              EKG 12-lead (Final result)  Result time 02/20/22 09:32:21      Final result by Interface, Lab In [facility] (02/20/22 09:32:21)                   Narrative:    Test Reason :     Vent. Rate : 126 BPM     Atrial Rate : 120 BPM     P-R Int : 000 ms          QRS Dur : 162 ms      QT Int : 412 ms       P-R-T Axes : 000 059 -31 degrees     QTc Int : 596 ms    Wide QRS tachycardia  Right bundle branch block  T wave abnormality, consider  inferior ischemia  Abnormal ECG  When compared with ECG of 19-FEB-2022 19:09,  Wide QRS tachycardia has replaced Junctional rhythm or SVT  Confirmed by Lencho BARROS MD ([id number]) on 2/20/2022 9:32:14 AM    Referred By: GAGAN   SELF           Confirmed By:Lenhco BARROS MD                                     Repeat EKG 12-lead (Final result)  Result time 02/20/22 09:34:32      Final result by Interface, Lab In [facility] (02/20/22 09:34:32)                   Narrative:    Test Reason :     Vent. Rate : 118 BPM     Atrial Rate : 117 BPM     P-R Int : 000 ms          QRS Dur : 118 ms      QT Int : 374 ms       P-R-T Axes : 000 056 -21 degrees     QTc Int : 524 ms    Accelerated Junctional rhythm vs SVT with artifact  Nonspecific intraventricular conduction delay  ST and T wave abnormality, consider inferior ischemia  Prolonged QT  Abnormal ECG  When compared with ECG of 27-JAN-2022 00:52,  Rhythm changed  Vent. rate has increased BY  50 BPM  Questionable change in QRS duration  Confirmed by Lencho BARROS MD ([id number]) on 2/20/2022 9:34:22 AM    Referred By: GAGAN   SELF           Confirmed By:Lencho BARROS MD                                    Results for orders placed during the hospital encounter of 01/25/22    Echo    Interpretation Summary  · There is abnormal septal wall motion.  · The left ventricle is normal in size with low normal systolic function.  · The estimated ejection fraction is 50%.  · Normal left ventricular diastolic function.  · Mild left atrial enlargement.  · Normal right ventricular size with normal right ventricular systolic function.  · Mild mitral regurgitation.  · There are segmental left ventricular wall motion abnormalities.  · Mild tricuspid regurgitation.  · Elevated central venous pressure (15 mmHg).      X-Ray Chest AP Portable  Narrative: EXAMINATION:  XR CHEST AP PORTABLE    CLINICAL HISTORY:  Sob, cough;    TECHNIQUE:  Single frontal view of the chest was performed.    COMPARISON:  Chest radiograph  February 19, 2022    FINDINGS:  AP portable chest radiographic examination is submitted.  When accounting for difference in position and technique the appearance of the cardiomediastinal silhouette is thought stable.    There appears to be mild prominence of the central pulmonary vascular, there is bilateral pattern of pulmonary opacity consistent with interstitial and ground-glass and patchy alveolar infiltrates, this appears superimposed on chronic change.  There is no evidence for large pleural effusion there is minimal blunting at the right costophrenic angle that may relate to a small amount of pleural fluid.  There is no evidence for pneumothorax.  The osseous structures demonstrate chronic change.  Impression: Mild prominence of the central pulmonary vascular, there is appearance of bilateral opacity consistent with interstitial and ground-glass and patchy alveolar infiltrates.    Electronically signed by: Frantz Lopez  Date:    02/25/2022  Time:    22:03      All imaging within the last 24 hours was reviewed.       Discharge Planning   ALLIE: 3/9/2022     Code Status: DNR   Is the patient medically ready for discharge?: No    Reason for patient still in hospital (select all that apply): Patient trending condition, Laboratory test, Treatment, and Pending disposition  Discharge Plan A: Rehab   Discharge Delays: None known at this time        Assessment/Plan:      * Encephalopathy, metabolic  In the setting of DKA and sepsis upon admission.  Admit CT Head without any concerning new findings- no new infarct as discussed with radiology  Resolved.      Discharge planning issues  PT/OT recommend rehab.  Patient's wife will be going to a SNF and family wish for them to stay together.  COVID isolation complete on 3/9.      Pressure injury of buttock, unstageable  Seen by wound care with triad started      Severe sepsis  Upon admission:  Organ dysfunction indicated by Acute kidney injury, Encephalopathy  and Acute  respiratory failure  Source- Unclear. CXR with diffuse interstitial opacities, however no consolidation noted. UA with pyuria, without elevated leuks. Blood cultures pending. Possibly due to sacral wound, although doesn't look grossly infected.     Started on broad spectrum abx at admission with vanc/zosyn/doxy.    - vancomycin discontinued today as no MRSA has isolated.  No infectious source found but leukocytosis persists.  Consider de-escalation further based on continued improvement.  Day 3 abx today.    F/u Urine and blood cultures  - Wound care consulted for sacral decubitus wound. Appreciate recs.  -lactic acid overall trend improved    Acute hypoxemic respiratory failure  In setting of COVID infection. Resolved upon step-down from MICU. ORA.   maintain SpO2 88-92% given hx of COPD  -wean oxygen as tolerated      Palliative care status  Per MICU: patient wishes to be DNR/DNI and family agrees. Still want to continue full medical care     COVID-19 virus infection  Viral Pneumonia due to COVID-19  - COVID-19 testing:   -Patient is identified as High risk for severe complications of COVID 19 based on COVID risk score of 8   Initiate standard COVID protocols; COVID-19 testing Collection Date: 8/9/2021 Collection Time:   3:41 PM ,Infection Control notification  and isolation- respiratory, contact and droplet per protocol  COVID vaccinated with booster.   Received 1 dose of sotrovimab infusion 02/18/2022  - Diagnostics: Trend LDH, CRP, Ferritin, D-dimer Q48hrs if stable, more frequently if patient decompensating.     Lymphopenia, hyponatremia, hyperferritinemia, elevated troponin, elevated d-dimer, age, and medical comorbidities are significant predictors of poor clinical outcome     - Management: Per [facility] COVID Treatment Protocol (4/15/20)       - Monitoring:          - Telemetry & Continuous Pulse Oximetry       - Targeted therapy Remdesivir, 200mg IV x1, followed by 100mg IV daily x5 days total,  - Holding  dexamethasone PO/IV 6mg daily x10 days in the setting of DKA/HHS.-  -Weaning 02 as tolerated  - Anticoagulation, Patient admitted to critical care up initial presentation and on triple therapy- Will continue home apixaban dose anticoagulation      - Antibiotics:  - if indications, CXR findings, elevated procal. See protocol for alternatives.   - Discontinue early if low concern for bacterial co-infection          - Initiated on IV vanc, zosyn and doxycycline (unable to use azithromycin due to prolong QTc)     - Nutrition:           - Multivitamin PO daily          - Add Boost supplement          - Vitamin D 1000IU daily if deficient          - Ascorbic acid 500mg PO bid    - Supportive Care:          - acetaminophen 650mg PO Q6hr PRN fever/headache          - loperamide PRN viral diarrhea    Anticipate end isolation on 3/9/2022    Diabetic ketoacidosis with coma associated with type 2 diabetes mellitus  Patient with uncontrolled DM presenting with metabolic encephalopathy in the setting of DKA with coma. Suspect patient developed DKA in the setting of sepsis/ COVID-10   Glucose 1109, pH 7.17, Co2 15.6 HCO3 <5, AG unable to calculate  DKA protocol completed and DKA resolved in MICU and Pt stepped down on subq insulin.  Hypoglycemia episode upon step-down, detemir adjusted from BID to qhs per home long acting insulin and due to episode of hypoglycemia   Continue aspart TIDWM  Diabetic diet  POCT BGx4  Cotinue to titrate short and long acting insulin needs as indicated to in-pt hospital goal 140-180    Focal seizures  Continue home lancosamide     BRENDAN (acute kidney injury)  Estimated Creatinine Clearance: 65.8 mL/min (based on SCr of 1 mg/dL).  sCr maxed at 3.1, baseline 1.1-1.3. Likely prerenal in the setting of DKA  Improved with treatment of DKA  - Avoid nephrotoxins, renally dose meds    Paroxysmal atrial fibrillation  History of paroxysmal atrial fibrillation on home  Metoprolol XL 50 mg daily, diltiazem  mg  daily and amiodarone 200 mg daily.  - Continue po apixaban  - Continue amiodarone  - metoprolol and diltiazem held in MICU  in the setting of sepsis.   -resumed BB and CBB with titration as tolerated per BP          Embolic stroke involving right middle cerebral artery  Hx of CVA in Jan 2022. CT Head with evolving infarcts in right frontal and left cerebellar hemispheres. No concern for acute stroke this admit per discussion with radiology.   - Continue home antiplatelets, statin and eliquis       Coronary artery disease involving native coronary artery of native heart with unstable angina pectoris  Hx of CAD s/p placement of 2 LAUREN in RCA in 01/09/2022.   - Continue home ASA, plavix and statin    Pulmonary nodules  Has stable bilateral  pulmonary masses which could be malignancy per outpatient pulmonology notes. No pathology report as none of the nodules appeared to be safe for biopsy given high risk of PTX is high with many adjacent bulla.         Chronic pulmonary heart disease  Follows up with Pulm clinic in [place]. Last seen in December and was evaluated for pulmonary masses. Deemed to have mild COPD per notes. Not on any maintenance therapy.  - Continue albuterol inhaler     Type 2 diabetes mellitus with hyperglycemia, with long-term current use of insulin  See DKA  Glucoses remain erratic; Endocrine consulted    Hypertension associated with diabetes  resume home antihypertensives as tolerated  Held losartan in setting of BRENDAN  See pAF      VTE Risk Mitigation (From admission, onward)         Ordered     apixaban tablet 5 mg  2 times daily         02/19/22 2202     IP VTE HIGH RISK PATIENT  Once         02/19/22 2144     Place sequential compression device  Until discontinued         02/19/22 2144                  I have assessed these findings virtually using a telemed platform and with assistance of the bedside nurse.        The attending portion of this evaluation, treatment, and documentation was performed  per Komal Huynh MD via Telemedicine AudioVisual using the secure [employer] software platform with 2 way audio/video. The provider was located off-site and the patient is located in the hospital. The aforementioned video software was utilized to document the relevant history and physical exam     Note:  Secure AMT software used instead of [employer] for this encounter.      Komal Huynh MD  Department of Hospital Medicine   [facility] ([place])

## 2022-03-03 NOTE — SUBJECTIVE & OBJECTIVE
Telemedicine  This service was provided by Virtual Visit.    Patient was transferred to [facility] on:  2/28/2022     Chief Complaint   Patient presents with    Vomiting     Vomiting and altered mental status all day. COVID+     The patient location is: [id number]   Admitted 2/19/2022  6:57 PM  Present with the patient at the time of the telemed/virtual assessment: N/A    Interval History / Events Overnight:   The patient is able to provide adequate history. Additional history was obtained from past medical records. On Call Provider contacted about hypoglycemia  Patient complains of fatigue. Symptoms have improved since yesterday. Associated symptoms include: malaise. Symptoms are decreasing in severity.     Lab test(s) reviewed: H&H stable    Review of Systems   Constitutional:  Negative for fever.   Respiratory:  Positive for cough.      Objective:     Vital Signs (Most Recent):  Temp: 98.6 °F (37 °C) (03/03/22 1148)  Pulse: 71 (03/03/22 1148)  Resp: 19 (03/03/22 1148)  BP: 130/66 (03/03/22 1148)  SpO2: 95 % (03/03/22 1148) Vital Signs (24h Range):  Temp:  [97.6 °F (36.4 °C)-98.9 °F (37.2 °C)] 98.6 °F (37 °C)  Pulse:  [61-78] 71  Resp:  [17-22] 19  SpO2:  [92 %-96 %] 95 %  BP: (115-140)/(66-72) 130/66     Weight: 76.4 kg (168 lb 6.9 oz)  Body mass index is 21.63 kg/m².    Intake/Output Summary (Last 24 hours) at 3/3/2022 1422  Last data filed at 3/3/2022 1000  Gross per 24 hour   Intake 718 ml   Output 700 ml   Net 18 ml      Physical Exam  Constitutional:       General: He is not in acute distress.     Appearance: Normal appearance.   Eyes:      General: Lids are normal. No scleral icterus.        Right eye: No discharge.         Left eye: No discharge.      Conjunctiva/sclera: Conjunctivae normal.   Neck:      Trachea: Phonation normal.   Cardiovascular:      Rate and Rhythm: Normal rate.      Comments: Monitor / Vital signs reviewed at time of visit  Pulmonary:      Effort: Pulmonary effort is  normal. No tachypnea, accessory muscle usage or respiratory distress.   Abdominal:      General: There is no distension.   Skin:     Coloration: Skin is not cyanotic.   Neurological:      Mental Status: He is alert. He is not disoriented.   Psychiatric:         Attention and Perception: Attention normal.         Mood and Affect: Affect normal.         Behavior: Behavior is cooperative.       Significant Labs:     Recent Labs   Lab 12/29/21  0810 01/26/22  1512   HGBA1C 12.3* 11.5*     Recent Labs   Lab 03/03/22  0815 03/03/22  1150 03/03/22  1651   POCTGLUCOSE 141* 364* 476*     Recent Labs   Lab 03/01/22 0257 03/02/22  0311 03/03/22  0431   WBC 7.64 8.22 9.72   HGB 11.2* 11.3* 11.8*   HCT 34.1* 35.5* 36.6*    252 260     Recent Labs   Lab 03/01/22 0257 03/02/22 0311 03/03/22  0431   GRAN 63.0  4.8 62.2  5.1 61.1  5.9   LYMPH 16.9*  1.3 20.8  1.7 24.1  2.3   MONO 10.7  0.8 9.5  0.8 9.0  0.9   EOS 0.6* 0.5 0.5     Recent Labs   Lab 03/01/22 0257 03/02/22 0311 03/03/22  0431    141 136   K 3.8 3.7 3.9    105 104   CO2 22* 24 24   BUN 23 19 17   CREATININE 0.9 0.8 0.7   * 85 38*   CALCIUM 7.8* 8.0* 8.1*   ALBUMIN 1.7* 1.7* 1.9*   MG 1.6 1.6 1.6   PHOS 2.8 3.1 2.5*     Recent Labs   Lab 03/01/22 0257 03/02/22 0311 03/03/22  0431   ALKPHOS 95 90 102   ALT 8* 8* 11   AST 11 17 26   PROT 5.5* 5.7* 6.1   BILITOT 0.5 0.4 0.5     Procalcitonin (ng/mL)   Date Value   02/19/2022 0.93 (H)     Lactate (Lactic Acid) (mmol/L)   Date Value   02/20/2022 4.4 (HH)   02/20/2022 4.9 (HH)   02/19/2022 11.5 (HH)   01/25/2022 7.0 (HH)   01/25/2022 10.5 (HH)     BNP (pg/mL)   Date Value   02/19/2022 481 (H)   01/25/2022 658 (H)   02/01/2019 60   11/27/2018 33   07/02/2018 87     Sed Rate   Date Value   02/19/2022 92 mm/Hr (H)   06/01/2007 3 mm/hr     D-Dimer (mg/L FEU)   Date Value   03/01/2022 0.41   02/27/2022 0.37   02/25/2022 0.47   02/21/2022 0.50 (H)   02/19/2022 0.84 (H)     Ferritin (ng/mL)    Date Value   03/01/2022 274   02/27/2022 312 (H)   02/25/2022 337 (H)   02/21/2022 481 (H)   02/19/2022 564 (H)     LD (U/L)   Date Value   03/01/2022 179   02/27/2022 217   02/25/2022 279 (H)   02/21/2022 294 (H)   02/19/2022 318 (H)     Troponin I (ng/mL)   Date Value   02/21/2022 0.470 (H)   02/21/2022 0.654 (H)   02/20/2022 0.704 (H)   02/20/2022 0.390 (H)   02/19/2022 0.037 (H)   01/26/2022 0.820 (H)   01/26/2022 1.042 (H)   01/26/2022 1.494 (H)     CPK (U/L)   Date Value   02/19/2022 95   01/13/2022 271 (H)   10/21/2011 188   01/08/2008 140   06/01/2007 83   05/21/2007 403 (H)   04/30/2007 293 (H)     Results for orders placed or performed in visit on 05/05/14   Vitamin D   Result Value Ref Range    Vit D, 25-Hydroxy 24 (L) 30 - 96 ng/mL     POC Rapid COVID (no units)   Date Value   02/17/2022 Positive (A)   01/25/2022 Negative   01/12/2022 Negative   01/09/2022 Negative     ECG Results              EKG 12-lead (Final result)  Result time 02/20/22 09:32:21      Final result by Interface, Lab In [facility] (02/20/22 09:32:21)                   Narrative:    Test Reason :     Vent. Rate : 126 BPM     Atrial Rate : 120 BPM     P-R Int : 000 ms          QRS Dur : 162 ms      QT Int : 412 ms       P-R-T Axes : 000 059 -31 degrees     QTc Int : 596 ms    Wide QRS tachycardia  Right bundle branch block  T wave abnormality, consider inferior ischemia  Abnormal ECG  When compared with ECG of 19-FEB-2022 19:09,  Wide QRS tachycardia has replaced Junctional rhythm or SVT  Confirmed by Lencho BARROS MD ([id number]) on 2/20/2022 9:32:14 AM    Referred By: [email or web address]   SELF           Confirmed By:Lencho BARROS MD                                     Repeat EKG 12-lead (Final result)  Result time 02/20/22 09:34:32      Final result by Interface, Lab In [facility] (02/20/22 09:34:32)                   Narrative:    Test Reason :     Vent. Rate : 118 BPM     Atrial Rate : 117 BPM     P-R Int : 000 ms          QRS Dur : 118 ms      QT Int : 374  ms       P-R-T Axes : 000 056 -21 degrees     QTc Int : 524 ms    Accelerated Junctional rhythm vs SVT with artifact  Nonspecific intraventricular conduction delay  ST and T wave abnormality, consider inferior ischemia  Prolonged QT  Abnormal ECG  When compared with ECG of 27-JAN-2022 00:52,  Rhythm changed  Vent. rate has increased BY  50 BPM  Questionable change in QRS duration  Confirmed by Lencho BARROS MD ([id number]) on 2/20/2022 9:34:22 AM    Referred By: [email or web address]   SELF           Confirmed By:Lencho BARROS MD                                  X-Ray Chest AP Portable  Narrative: EXAMINATION:  XR CHEST AP PORTABLE    CLINICAL HISTORY:  Sob, cough;    TECHNIQUE:  Single frontal view of the chest was performed.    COMPARISON:  Chest radiograph February 19, 2022    FINDINGS:  AP portable chest radiographic examination is submitted.  When accounting for difference in position and technique the appearance of the cardiomediastinal silhouette is thought stable.    There appears to be mild prominence of the central pulmonary vascular, there is bilateral pattern of pulmonary opacity consistent with interstitial and ground-glass and patchy alveolar infiltrates, this appears superimposed on chronic change.  There is no evidence for large pleural effusion there is minimal blunting at the right costophrenic angle that may relate to a small amount of pleural fluid.  There is no evidence for pneumothorax.  The osseous structures demonstrate chronic change.  Impression: Mild prominence of the central pulmonary vascular, there is appearance of bilateral opacity consistent with interstitial and ground-glass and patchy alveolar infiltrates.    Electronically signed by: Frantz Lopez  Date:    02/25/2022  Time:    22:03      Labs and Imaging within the last 24 hours listed above were reviewed.       Diet: Diet diabetic [facility]; 2000 Calorie  Significant LDAs:   IV Access Type: Peripheral  Urinary Catheter Indication if present:  Patient Does Not Have Urinary Catheter  Other Lines/Tubes/Drains:    HIGH RISK CONDITION(S):   Patient has a condition that poses threat to life and bodily function: Severe Respiratory Distress     Goals of Care:    Previous admission:  2/17/22  Likely prognosis:  Fair  Code Status: DNR  Comfort Only: No  Hospice: No  Goals at discharge: remain at home, with physician follow-up    Discharge Planning   ALLIE: 3/9/2022     Code Status: DNR   Is the patient medically ready for discharge?: No    Reason for patient still in hospital (select all that apply): Patient trending condition and Pending disposition  Discharge Plan A: Rehab   Discharge Delays: None known at this time

## 2022-03-03 NOTE — PT/OT/SLP PROGRESS
Occupational Therapy   Treatment    Name: Kamar Muñoz  MRN: [id number]  Admitting Diagnosis:  Encephalopathy, metabolic       Recommendations:     Discharge Recommendations: rehabilitation facility  Discharge Equipment Recommendations:  other (see comments)  Barriers to discharge:  Decreased caregiver support    Assessment:     Kamar Muñoz is a 76 y.o. male with a medical diagnosis of Encephalopathy, metabolic.  He presents with left leaning sitting EOB which pt states he is unaware of as well as posterior leaning in standing requiring Max A to s/p to the chair. Pt continues to be an excellent IPR candidate due to his multiple deficits and belove-level functional independence. Performance deficits affecting function are weakness, impaired endurance, impaired self care skills, impaired functional mobilty, gait instability, impaired balance, decreased coordination, decreased safety awareness.     Rehab Prognosis:  Good; patient would benefit from acute skilled OT services to address these deficits and reach maximum level of function.       Plan:     Patient to be seen 4 x/week to address the above listed problems via self-care/home management, therapeutic activities, therapeutic exercises, neuromuscular re-education  · Plan of Care Expires: 03/22/22  · Plan of Care Reviewed with: patient    Subjective     Pain/Comfort:  · Pain Rating 1: 0/10    Objective:     Communicated with: rn prior to session.  Patient found supine with telemetry, pulse ox (continuous) upon OT entry to room.    General Precautions: Standard, fall   Orthopedic Precautions:N/A   Braces:    Respiratory Status: Room air     Occupational Performance:     Bed Mobility:    · Patient completed Rolling/Turning to Left with  minimum assistance  · Patient completed Scooting/Bridging with minimum assistance  · Patient completed Supine to Sit with minimum assistance     Functional Mobility/Transfers:  · Patient completed Sit <> Stand Transfer with  moderate assistance  with  no assistive device and rolling walker (2 trials)  · Patient completed Bed <> Chair Transfer using Stand Pivot technique with maximal assistance with no assistive device    Activities of Daily Living:  · Feeding:  independence    [facility] 6 Click ADL: 17    Treatment & Education:  Discussed OT POC and progress.    Patient left up in chair with all lines intact and call button in reachEducation:      GOALS:   Multidisciplinary Problems     Occupational Therapy Goals        Problem: Occupational Therapy Goal    Goal Priority Disciplines Outcome Interventions   Occupational Therapy Goal     OT, PT/OT Ongoing, Progressing    Description: Goals to be met by: 3/6/22     Patient will increase functional independence with ADLs by performing:    Feeding with [place]. MET 3/3  UE Dressing with Stand-by Assistance.  LE Dressing with Minimal Assistance.  Grooming while standing with Stand-by Assistance.  Toileting from bedside commode with Stand-by Assistance for hygiene and clothing management.   Toilet transfer to bedside commode with Stand-by Assistance.                     Time Tracking:     OT Date of Treatment: 03/03/22  OT Start Time: 0851  OT Stop Time: 0914  OT Total Time (min): 23 min    Billable Minutes:Therapeutic Activity 23    OT/JUAN: OT          3/3/2022

## 2022-03-03 NOTE — ASSESSMENT & PLAN NOTE
PT/OT recommend rehab.  Patient's wife will be going to a SNF and family wish for them to stay together.  COVID isolation complete on 3/9.

## 2022-03-03 NOTE — SUBJECTIVE & OBJECTIVE
This encounter was provided through telemedicine.  Patient was transferred to the telemedicine service on:  02/28/2022   The patient location is: [id number] A admitted 2/19/2022  6:57 PM.  Present with the patient at the time of the telemed/virtual assessment: Family member    Interval History/Overnight Events:     Continues to eat more if able to pick foods - he continues to complain of diarrhea but nurse states no BM today - no abd pain or nausea. Remains with fatigue - denies dyspnea - worked with PT but not OT due to abd discomfort which he does not recall; previous pain to buttocks/ feet denied today      Review of Systems   Constitutional:  Positive for activity change, appetite change and fatigue. Negative for fever.   Respiratory:  Negative for cough and shortness of breath.    Gastrointestinal:  Positive for diarrhea. Negative for vomiting.   Musculoskeletal:  Positive for arthralgias and myalgias.   Neurological:  Positive for weakness (Generalized).   Psychiatric/Behavioral:  Positive for dysphoric mood.       Inpatient Medications:  Scheduled Meds:   acetaminophen  650 mg Oral TID    amiodarone  200 mg Oral Daily    apixaban  5 mg Oral BID    ascorbic acid (vitamin C)  500 mg Oral BID    aspirin  81 mg Oral Daily    atorvastatin  40 mg Oral Daily    clopidogreL  75 mg Oral Daily    diclofenac sodium  4 g Topical (Top) QID    gabapentin  200 mg Oral TID    insulin aspart U-100  5 Units Subcutaneous TIDWM    insulin detemir U-100  11 Units Subcutaneous QHS    lacosamide  100 mg Oral Q12H    LIDOcaine  1 patch Transdermal Q24H    metoprolol tartrate  25 mg Oral BID    multivitamin  1 tablet Oral Daily    pantoprazole  40 mg Oral BID     Continuous Infusions:  PRN Meds:.acetaminophen, albuterol **AND** MDI Q4H, calcium carbonate, dextrose 10%, dextrose 10%, glucagon (human recombinant), glucose, glucose, insulin aspart U-100, loperamide, melatonin, ondansetron, oxyCODONE, sodium chloride 0.9%       Objective:     Temp:  [98.1 °F (36.7 °C)-98.9 °F (37.2 °C)] 98.9 °F (37.2 °C)  Pulse:  [61-81] 71  Resp:  [16-22] 22  SpO2:  [92 %-98 %] 96 %  BP: (101-133)/(58-70) 126/70      Intake/Output Summary (Last 24 hours) at 3/2/2022 2303  Last data filed at 3/2/2022 1300  Gross per 24 hour   Intake 340 ml   Output 300 ml   Net 40 ml          Body mass index is 21.63 kg/m².    Physical Exam  Vitals and nursing note reviewed.   Constitutional:       General: He is not in acute distress.     Appearance: He is normal weight. He is ill-appearing.   HENT:      Head: Normocephalic and atraumatic.      Right Ear: Hearing normal.      Left Ear: Hearing normal.      Nose: Nose normal.   Eyes:      General: No scleral icterus.        Right eye: No discharge.         Left eye: No discharge.      Extraocular Movements: Extraocular movements intact.   Cardiovascular:      Rate and Rhythm: Normal rate.   Pulmonary:      Effort: Pulmonary effort is normal. No accessory muscle usage or respiratory distress.   Skin:     Findings: No rash.   Neurological:      Mental Status: He is alert. Mental status is at baseline.      Cranial Nerves: No cranial nerve deficit.      Motor: Weakness (Generalized) present.   Psychiatric:         Attention and Perception: Attention normal.         Mood and Affect: Mood normal.         Speech: Speech normal.         Behavior: Behavior is cooperative.        Labs:  Recent Results (from the past 24 hour(s))   POCT glucose    Collection Time: 03/01/22 11:53 PM   Result Value Ref Range    POCT Glucose 114 (H) 70 - 110 mg/dL   Comprehensive metabolic panel    Collection Time: 03/02/22  3:11 AM   Result Value Ref Range    Sodium 141 136 - 145 mmol/L    Potassium 3.7 3.5 - 5.1 mmol/L    Chloride 105 95 - 110 mmol/L    CO2 24 23 - 29 mmol/L    Glucose 85 70 - 110 mg/dL    BUN 19 8 - 23 mg/dL    Creatinine 0.8 0.5 - 1.4 mg/dL    Calcium 8.0 (L) 8.7 - 10.5 mg/dL    Total Protein 5.7 (L) 6.0 - 8.4 g/dL    Albumin 1.7  (L) 3.5 - 5.2 g/dL    Total Bilirubin 0.4 0.1 - 1.0 mg/dL    Alkaline Phosphatase 90 55 - 135 U/L    AST 17 10 - 40 U/L    ALT 8 (L) 10 - 44 U/L    Anion Gap 12 8 - 16 mmol/L    eGFR if African American >60.0 >60 mL/min/1.73 m^2    eGFR if non African American >60.0 >60 mL/min/1.73 m^2   Magnesium    Collection Time: 03/02/22  3:11 AM   Result Value Ref Range    Magnesium 1.6 1.6 - 2.6 mg/dL   CBC auto differential    Collection Time: 03/02/22  3:11 AM   Result Value Ref Range    WBC 8.22 3.90 - 12.70 K/uL    RBC 4.04 (L) 4.60 - 6.20 M/uL    Hemoglobin 11.3 (L) 14.0 - 18.0 g/dL    Hematocrit 35.5 (L) 40.0 - 54.0 %    MCV 88 82 - 98 fL    MCH 28.0 27.0 - 31.0 pg    MCHC 31.8 (L) 32.0 - 36.0 g/dL    RDW 15.3 (H) 11.5 - 14.5 %    Platelets 252 150 - 450 K/uL    MPV 9.7 9.2 - 12.9 fL    Immature Granulocytes 0.4 0.0 - 0.5 %    Gran # (ANC) 5.1 1.8 - 7.7 K/uL    Immature Grans (Abs) 0.03 0.00 - 0.04 K/uL    Lymph # 1.7 1.0 - 4.8 K/uL    Mono # 0.8 0.3 - 1.0 K/uL    Eos # 0.5 0.0 - 0.5 K/uL    Baso # 0.06 0.00 - 0.20 K/uL    nRBC 0 0 /100 WBC    Gran % 62.2 38.0 - 73.0 %    Lymph % 20.8 18.0 - 48.0 %    Mono % 9.5 4.0 - 15.0 %    Eosinophil % 6.4 0.0 - 8.0 %    Basophil % 0.7 0.0 - 1.9 %    Differential Method Automated    Phosphorus    Collection Time: 03/02/22  3:11 AM   Result Value Ref Range    Phosphorus 3.1 2.7 - 4.5 mg/dL   POCT glucose    Collection Time: 03/02/22  5:04 AM   Result Value Ref Range    POCT Glucose 84 70 - 110 mg/dL   POCT glucose    Collection Time: 03/02/22  7:45 AM   Result Value Ref Range    POCT Glucose 98 70 - 110 mg/dL   POCT glucose    Collection Time: 03/02/22 11:19 AM   Result Value Ref Range    POCT Glucose 304 (H) 70 - 110 mg/dL   POCT glucose    Collection Time: 03/02/22  3:26 PM   Result Value Ref Range    POCT Glucose 285 (H) 70 - 110 mg/dL   POCT glucose    Collection Time: 03/02/22  9:02 PM   Result Value Ref Range    POCT Glucose 105 70 - 110 mg/dL        Lab Results   Component  Value Date    [id number] Positive (A) 02/17/2022       Recent Labs   Lab 02/28/22  0606 03/01/22  0257 03/02/22  0311   WBC 9.33 7.64 8.22   LYMPH 11.8*  1.1 16.9*  1.3 20.8  1.7   HGB 11.9* 11.2* 11.3*   HCT 36.8* 34.1* 35.5*    245 252       Recent Labs   Lab 02/28/22  0606 03/01/22  0257 03/02/22  0311    136 141   K 4.8 3.8 3.7    104 105   CO2 20* 22* 24   BUN 22 23 19   CREATININE 1.0 0.9 0.8   * 206* 85   CALCIUM 8.0* 7.8* 8.0*   MG 1.6 1.6 1.6   PHOS 2.8 2.8 3.1       Recent Labs   Lab 02/28/22  0606 03/01/22  0257 03/02/22 0311   ALKPHOS 109 95 90   ALT 9* 8* 8*   AST 20 11 17   ALBUMIN 1.8* 1.7* 1.7*   PROT 6.0 5.5* 5.7*   BILITOT 0.6 0.5 0.4          Recent Labs     03/01/22  2151   DDIMER 0.41   FERRITIN 274            All labs within the last 24 hours were reviewed.     Microbiology:  Microbiology Results (last 7 days)       Procedure Component Value Units Date/Time    Clostridium difficile EIA [[id number]] Collected: 02/28/22 1054    Order Status: Completed Specimen: Stool Updated: 03/01/22 0118     C. diff Antigen Negative     C difficile Toxins A+B, EIA Negative     Comment: Testing not recommended for children <24 months old.       Blood culture x two cultures. Draw prior to antibiotics. [[id number]] Collected: 02/19/22 1932    Order Status: Completed Specimen: Blood from Peripheral, Hand, Left Updated: 02/24/22 2212     Blood Culture, Routine No growth after 5 days.    Narrative:      Aerobic and anaerobic    Blood culture x two cultures. Draw prior to antibiotics. [[id number]] Collected: 02/19/22 1932    Order Status: Completed Specimen: Blood from Peripheral, Hand, Left Updated: 02/24/22 2212     Blood Culture, Routine No growth after 5 days.    Narrative:      Aerobic and anaerobic              Imaging  ECG Results              EKG 12-lead (Final result)  Result time 02/20/22 09:32:21      Final result by Interface, Lab In [facility] (02/20/22 09:32:21)                    Narrative:    Test Reason :     Vent. Rate : 126 BPM     Atrial Rate : 120 BPM     P-R Int : 000 ms          QRS Dur : 162 ms      QT Int : 412 ms       P-R-T Axes : 000 059 -31 degrees     QTc Int : 596 ms    Wide QRS tachycardia  Right bundle branch block  T wave abnormality, consider inferior ischemia  Abnormal ECG  When compared with ECG of 19-FEB-2022 19:09,  Wide QRS tachycardia has replaced Junctional rhythm or SVT  Confirmed by Lencho BARROS MD ([id number]) on 2/20/2022 9:32:14 AM    Referred By: GAGAN   SELF           Confirmed By:Lencho BARROS MD                                     Repeat EKG 12-lead (Final result)  Result time 02/20/22 09:34:32      Final result by Interface, Lab In [facility] (02/20/22 09:34:32)                   Narrative:    Test Reason :     Vent. Rate : 118 BPM     Atrial Rate : 117 BPM     P-R Int : 000 ms          QRS Dur : 118 ms      QT Int : 374 ms       P-R-T Axes : 000 056 -21 degrees     QTc Int : 524 ms    Accelerated Junctional rhythm vs SVT with artifact  Nonspecific intraventricular conduction delay  ST and T wave abnormality, consider inferior ischemia  Prolonged QT  Abnormal ECG  When compared with ECG of 27-JAN-2022 00:52,  Rhythm changed  Vent. rate has increased BY  50 BPM  Questionable change in QRS duration  Confirmed by Lencho BARROS MD ([id number]) on 2/20/2022 9:34:22 AM    Referred By: GAGAN   SELF           Confirmed By:Lencho BARROS MD                                    Results for orders placed during the hospital encounter of 01/25/22    Echo    Interpretation Summary  · There is abnormal septal wall motion.  · The left ventricle is normal in size with low normal systolic function.  · The estimated ejection fraction is 50%.  · Normal left ventricular diastolic function.  · Mild left atrial enlargement.  · Normal right ventricular size with normal right ventricular systolic function.  · Mild mitral regurgitation.  · There are segmental left ventricular wall motion  abnormalities.  · Mild tricuspid regurgitation.  · Elevated central venous pressure (15 mmHg).      X-Ray Chest AP Portable  Narrative: EXAMINATION:  XR CHEST AP PORTABLE    CLINICAL HISTORY:  Sob, cough;    TECHNIQUE:  Single frontal view of the chest was performed.    COMPARISON:  Chest radiograph February 19, 2022    FINDINGS:  AP portable chest radiographic examination is submitted.  When accounting for difference in position and technique the appearance of the cardiomediastinal silhouette is thought stable.    There appears to be mild prominence of the central pulmonary vascular, there is bilateral pattern of pulmonary opacity consistent with interstitial and ground-glass and patchy alveolar infiltrates, this appears superimposed on chronic change.  There is no evidence for large pleural effusion there is minimal blunting at the right costophrenic angle that may relate to a small amount of pleural fluid.  There is no evidence for pneumothorax.  The osseous structures demonstrate chronic change.  Impression: Mild prominence of the central pulmonary vascular, there is appearance of bilateral opacity consistent with interstitial and ground-glass and patchy alveolar infiltrates.    Electronically signed by: Frantz Lopez  Date:    02/25/2022  Time:    22:03      All imaging within the last 24 hours was reviewed.       Discharge Planning   ALLIE: 3/9/2022     Code Status: DNR   Is the patient medically ready for discharge?: No    Reason for patient still in hospital (select all that apply): Patient trending condition, Laboratory test, Treatment, and Pending disposition  Discharge Plan A: Rehab   Discharge Delays: None known at this time

## 2022-03-03 NOTE — PLAN OF CARE
Per Dameon, [facility] denied [facility].    JASIEL faxed referral to SNF's via [facility] for review. JASIEL will continue to follow.     1:51 PM  SW received a call from the patient's son ED, to discuss the patient's d/c plans. JASIEL informed Ed that the patient was denied auth by [facility] for Rehab. Per ED, he would like the patient to go to [facility] to be where the patient's spouse will be. JASIEL informed Ed that JASIEL will send a referral, however if the patient becomes medically stable prior to [facility] accepting or having an available bed the patient would have to go to another accepting facility. Per ED, he only wants [facility] for the patient. JASIEL will continue to follow.     Marley Gregg LMSW   - [facility]  Ext. [phone]

## 2022-03-03 NOTE — ASSESSMENT & PLAN NOTE
Viral Pneumonia due to COVID-19  - COVID-19 testing:   -Patient is identified as High risk for severe complications of COVID 19 based on COVID risk score of 8   Initiate standard COVID protocols; COVID-19 testing Collection Date: 8/9/2021 Collection Time:   3:41 PM ,Infection Control notification  and isolation- respiratory, contact and droplet per protocol  COVID vaccinated with booster.   Received 1 dose of sotrovimab infusion 02/18/2022  - Diagnostics: Trend LDH, CRP, Ferritin, D-dimer Q48hrs if stable, more frequently if patient decompensating.     Lymphopenia, hyponatremia, hyperferritinemia, elevated troponin, elevated d-dimer, age, and medical comorbidities are significant predictors of poor clinical outcome     - Management: Per [facility] COVID Treatment Protocol (4/15/20)       - Monitoring:          - Telemetry & Continuous Pulse Oximetry       - Targeted therapy Remdesivir, 200mg IV x1, followed by 100mg IV daily x5 days total,  - Holding dexamethasone PO/IV 6mg daily x10 days in the setting of DKA/HHS.-  -Weaning 02 as tolerated  - Anticoagulation, Patient admitted to critical care up initial presentation and on triple therapy- Will continue home apixaban dose anticoagulation      - Antibiotics:  - if indications, CXR findings, elevated procal. See protocol for alternatives.   - Discontinue early if low concern for bacterial co-infection          - Initiated on IV vanc, zosyn and doxycycline (unable to use azithromycin due to prolong QTc)     - Nutrition:           - Multivitamin PO daily          - Add Boost supplement          - Vitamin D 1000IU daily if deficient          - Ascorbic acid 500mg PO bid    - Supportive Care:          - acetaminophen 650mg PO Q6hr PRN fever/headache          - loperamide PRN viral diarrhea    Anticipate end isolation on 3/9/2022

## 2022-03-03 NOTE — PLAN OF CARE
Problem: Adult Inpatient Plan of Care  Goal: Plan of Care Review  Outcome: Ongoing, Not Progressing  Goal: Optimal Comfort and Wellbeing  Outcome: Ongoing, Not Progressing     Problem: Impaired Wound Healing  Goal: Optimal Wound Healing  Outcome: Ongoing, Not Progressing     Problem: Gas Exchange Impaired  Goal: Optimal Gas Exchange  Outcome: Ongoing, Not Progressing     Problem: Diabetic Ketoacidosis  Goal: Fluid and Electrolyte Balance with Absence of Ketosis  Outcome: Ongoing, Progressing       AAOx4. Continues on 1.5L 02 via nc to maintain 02 sats > 90%. Reports some episodes of sob. Deep breathing exercises encouraged. No s/s of hyperglycemia. No reports of loose stool throughout shift.

## 2022-03-03 NOTE — PT/OT/SLP PROGRESS
Physical Therapy Treatment  CO-Tx with OT    Patient Name:  Kamar Muñoz   MRN:  [id number]    Co-treatment performed due to patient's multiple deficits requiring two skilled therapists to appropriately and safely assess patient's strength and endurance while facilitating functional tasks in addition to accommodating for patient's activity tolerance.  Recommendations:     Discharge Recommendations:  rehabilitation facility   Discharge Equipment Recommendations: none   Barriers to discharge: Increased skilled assistance needed; fall risk     Assessment:     Kamar Muñoz is a 76 y.o. male admitted with a medical diagnosis of Encephalopathy, metabolic.  He presents with the following impairments/functional limitations:  weakness, impaired endurance, impaired functional mobilty, gait instability, impaired balance, impaired cognition, decreased coordination, decreased upper extremity function, decreased lower extremity function, impaired self care skills. Patient tolerated session fairly well this date. He continues with significant weakness. Patient is an excellent candidate for IPR due to having a qualifying diagnosis, high level of motivation to improve, appropriate support following discharge, and able to tolerate 3 hours of intensive therapy in order to achieve a greater level of mobility.. Patient would continue to benefit from skilled PT services while in the hospital. At this time, upon d/c recommend IPR in order for patient to progress towards an improved level of functional mobility independence.     Rehab Prognosis: Good; patient would benefit from acute skilled PT services to address these deficits and reach maximum level of function.    Recent Surgery: * No surgery found *      Plan:     During this hospitalization, patient to be seen 4 x/week to address the identified rehab impairments via therapeutic activities, therapeutic exercises, gait training, neuromuscular re-education and progress toward  "the following goals:    · Plan of Care Expires:  03/22/22    Subjective     Chief Complaint: weakness   Patient/Family Comments/goals: "Tell my wife I got to the chair."  Pain/Comfort:  · Pain Rating 1: 0/10  · Pain Rating Post-Intervention 1: 0/10      Objective:     Communicated with RN prior to session.  Kamar Muñoz found HOB elevated with telemetry, oxygen, pulse ox (continuous) upon PT entry to room.     General Precautions: Standard, airborne, droplet, contact, fall   Orthopedic Precautions:N/A   Braces: N/A     Functional Mobility:  · Bed Mobility:     · Rolling Left:  minimum assistance  · Scooting: minimum assistance  · Supine to Sit: minimum assistance  · Transfers:     · Sit to Stand: 2 trials from EOB   · 1st trial: minimum assistance and of 2 persons with rolling walker  · 2nd trial: moderate assistance and of 1 person    · Posterior lean, decreased hip extension, unsteady, narrow NICOLE   · Bed to Chair: maximal assistance with  no AD  using  Stand Pivot  · Unsteady, posterior lean   · Balance: sitting EOB - SBA-Gema posterior lean, left lateral lean; static standing - ModA-MaxA       AM-PAC 6 CLICK MOBILITY  Turning over in bed (including adjusting bedclothes, sheets and blankets)?: 3  Sitting down on and standing up from a chair with arms (e.g., wheelchair, bedside commode, etc.): 2  Moving from lying on back to sitting on the side of the bed?: 3  Moving to and from a bed to a chair (including a wheelchair)?: 2  Need to walk in hospital room?: 2  Climbing 3-5 steps with a railing?: 1  Basic Mobility Total Score: 13       Therapeutic Activities and Education:  -Patient educated on the continued role and goal of PT  -Questions and concerns answered within the the PT scope of practice.   -White board updated in patient room to reflect level of assistance needed with nursing.   -Patient Mod(A)x2 - stand pivot for mobility with RN    Kamar Muñoz left up in chair with all lines intact, call " button in reach and RN and PCT  notified..    GOALS:   Multidisciplinary Problems     Physical Therapy Goals        Problem: Physical Therapy Goal    Goal Priority Disciplines Outcome Goal Variances Interventions   Physical Therapy Goal     PT, PT/OT Ongoing, Progressing     Description: Goals to be met by: 3/10/22     Patient will increase functional independence with mobility by performin. Supine to sit with MInimal Assistance - met   1a. Supine to sit with SBA   2. Sit to supine with MInimal Assistance - met   2a. Sit to supine with SBA   3. Sit to stand transfer with Minimal Assistance - met   3a. Sit to stand transfer SBA  4. Gait  x 25 feet with Minimal Assistance using LRAD, if needed. - met   4a. Gait x150 ft with SBA using RW or LRAD as needed  5. Sitting at edge of bed x10 minutes with Modified [place]  6. Stand for 3 minutes with Minimal Assistance using LRAD, if needed  7. Lower extremity exercise program x10 reps per handout, with independence                     Time Tracking:     PT Received On: 22  PT Start Time: 0851     PT Stop Time: 0914  PT Total Time (min): 23 min     Billable Minutes: Therapeutic Activity 10 and Neuromuscular Re-education 13    Treatment Type: Treatment  PT/PTA: PT     PTA Visit Number: 0     Deloris Galaviz, PT  2022

## 2022-03-03 NOTE — PLAN OF CARE
Problem: Adult Inpatient Plan of Care  Goal: Plan of Care Review  Outcome: Ongoing, Progressing  Goal: Patient-Specific Goal (Individualized)  Outcome: Ongoing, Progressing  Goal: Absence of Hospital-Acquired Illness or Injury  Outcome: Ongoing, Progressing  Goal: Optimal Comfort and Wellbeing  Outcome: Ongoing, Progressing  Goal: Readiness for Transition of Care  Outcome: Ongoing, Progressing     Problem: Diabetes Comorbidity  Goal: Blood Glucose Level Within Targeted Range  Outcome: Ongoing, Progressing     Problem: Fluid and Electrolyte Imbalance (Acute Kidney Injury/Impairment)  Goal: Fluid and Electrolyte Balance  Outcome: Ongoing, Progressing     Problem: Oral Intake Inadequate (Acute Kidney Injury/Impairment)  Goal: Optimal Nutrition Intake  Outcome: Ongoing, Progressing     Problem: Renal Function Impairment (Acute Kidney Injury/Impairment)  Goal: Effective Renal Function  Outcome: Ongoing, Progressing     Problem: Impaired Wound Healing  Goal: Optimal Wound Healing  Outcome: Ongoing, Progressing     Problem: Fall Injury Risk  Goal: Absence of Fall and Fall-Related Injury  Outcome: Ongoing, Progressing     Problem: Restraint, Nonbehavioral (Nonviolent)  Goal: Absence of Harm or Injury  Outcome: Ongoing, Progressing     Problem: Infection  Goal: Absence of Infection Signs and Symptoms  Outcome: Ongoing, Progressing     Problem: Adjustment to Illness (Sepsis/Septic Shock)  Goal: Optimal Coping  Outcome: Ongoing, Progressing     Problem: Bleeding (Sepsis/Septic Shock)  Goal: Absence of Bleeding  Outcome: Ongoing, Progressing     Problem: Glycemic Control Impaired (Sepsis/Septic Shock)  Goal: Blood Glucose Level Within Desired Range  Outcome: Ongoing, Progressing     Problem: Infection Progression (Sepsis/Septic Shock)  Goal: Absence of Infection Signs and Symptoms  Outcome: Ongoing, Progressing     Problem: Nutrition Impaired (Sepsis/Septic Shock)  Goal: Optimal Nutrition Intake  Outcome: Ongoing,  Progressing

## 2022-03-03 NOTE — PROGRESS NOTES
"[facility] ([facility]  Adult Nutrition  Progress Note    SUMMARY   Recommendations  Continue  2000 diabetic diet, recommend boost glucose daily, RD following  Goals: Meet % EEN, EPN by RD f/u date  Nutrition Goal Status: progressing towards goal  Communication of RD Recs: reviewed with physician    Assessment and Plan   Inadequate energy intake     Related to (etiology):   Decreased appetite      Signs and Symptoms (as evidenced by):   Poor PO intake 25-20%     Interventions(treatment strategy):  Collaboration of nutrition care w/ other providers  Commercial beverage( calories and protein) boost glucose daily  Carbohydrate modified diet -2000     Nutrition Diagnosis Status: Ongoing                         Reason for Assessment    Reason For Assessment: RD follow-up  Diagnosis: other (see comments) (Encephalopathy)  Relevant Medical History: HTN, HLD, COPD, DM  Interdisciplinary Rounds: did not attend  General Information Comments: Isolation continues  PO 50%  Nutrition Discharge Planning: DC on diabetic diet    Nutrition/Diet History    Spiritual, Cultural Beliefs, Taoist Practices, Values that Affect Care: no  Factors Affecting Nutritional Intake: decreased appetite    Anthropometrics    Temp: 98.2 °F (36.8 °C)  Height Method: Measured  Height: 6' 2" (188 cm)  Height (inches): 74 in  Weight Method: Bed Scale  Weight: 76.4 kg (168 lb 6.9 oz)  Weight (lb): 168.43 lb  Ideal Body Weight (IBW), Male: 190 lb  % Ideal Body Weight, Male (lb): 88.65 %  BMI (Calculated): 21.6  BMI Grade: 18.5-24.9 - normal       Lab/Procedures/Meds    Pertinent Labs Reviewed: reviewed  Pertinent Labs Comments: Hg 11.8, hct 36.6, PO 2.5, glucose 38  Pertinent Medications Reviewed: reviewed  Pertinent Medications Comments: MVI     Estimated/Assessed Needs  Weight Used For Calorie Calculations: 76.4 kg (168 lb 6.9 oz)  Energy Calorie Requirements (kcal): 1941 kcal/d  Energy Need Method: [place] (1.25 " PAL)  Protein Requirements: 92 g/d (1.2 g/kg)  Weight Used For Protein Calculations: 76.4 kg (168 lb 6.9 oz)     Estimated Fluid Requirement Method: other (see comments) (Per MD or 1 mL/kcal)  RDA Method (mL): 1941     Nutrition Prescription Ordered  Current Diet Order: 2000 diabetic    Evaluation of Received Nutrient/Fluid Intake  I/O: +1268  Energy Calories Required: not meeting needs  Protein Required: not meeting needs  Fluid Required: meeting needs  Comments: LBM 3/3  Tolerance: tolerating  % Intake of Estimated Energy Needs: 50 - 75 %  % Meal Intake: 50 - 75 %    Nutrition Risk    Level of Risk/Frequency of Follow-up: low (one time per week)     Monitor and Evaluation    Food and Nutrient Intake: energy intake, food and beverage intake  Food and Nutrient Adminstration: diet order  Physical Activity and Function: nutrition-related ADLs and IADLs  Anthropometric Measurements: weight, weight change  Biochemical Data, Medical Tests and Procedures: inflammatory profile, lipid profile, glucose/endocrine profile, electrolyte and renal panel  Nutrition-Focused Physical Findings: overall appearance     Nutrition Follow-Up    RD Follow-up?: Yes

## 2022-03-03 NOTE — NURSING
This nurse was notified by lab that pts serum glucose was 38, glucose was rechecked using finger stick and Bg leve was 40. Pt asymptomatic, awake, and alert. Oral glucose tabs given per order, pt also eating a snack as well. Provider notified.

## 2022-03-04 ENCOUNTER — DOCUMENT SCAN (OUTPATIENT)
Dept: HOME HEALTH SERVICES | Facility: HOSPITAL | Age: 77
End: 2022-03-04
Payer: MEDICARE

## 2022-03-04 LAB
ALBUMIN SERPL BCP-MCNC: 1.8 G/DL (ref 3.5–5.2)
ALP SERPL-CCNC: 106 U/L (ref 55–135)
ALT SERPL W/O P-5'-P-CCNC: 11 U/L (ref 10–44)
ANION GAP SERPL CALC-SCNC: 10 MMOL/L (ref 8–16)
AST SERPL-CCNC: 20 U/L (ref 10–40)
BASOPHILS # BLD AUTO: 0.08 K/UL (ref 0–0.2)
BASOPHILS NFR BLD: 1 % (ref 0–1.9)
BILIRUB SERPL-MCNC: 0.5 MG/DL (ref 0.1–1)
BUN SERPL-MCNC: 16 MG/DL (ref 8–23)
CALCIUM SERPL-MCNC: 8.1 MG/DL (ref 8.7–10.5)
CHLORIDE SERPL-SCNC: 103 MMOL/L (ref 95–110)
CO2 SERPL-SCNC: 26 MMOL/L (ref 23–29)
CREAT SERPL-MCNC: 0.9 MG/DL (ref 0.5–1.4)
DIFFERENTIAL METHOD: ABNORMAL
EOSINOPHIL # BLD AUTO: 0.4 K/UL (ref 0–0.5)
EOSINOPHIL NFR BLD: 4.8 % (ref 0–8)
ERYTHROCYTE [DISTWIDTH] IN BLOOD BY AUTOMATED COUNT: 15.8 % (ref 11.5–14.5)
EST. GFR  (AFRICAN AMERICAN): >60 ML/MIN/1.73 M^2
EST. GFR  (NON AFRICAN AMERICAN): >60 ML/MIN/1.73 M^2
GLUCOSE SERPL-MCNC: 173 MG/DL (ref 70–110)
HCT VFR BLD AUTO: 35.9 % (ref 40–54)
HGB BLD-MCNC: 11.8 G/DL (ref 14–18)
IMM GRANULOCYTES # BLD AUTO: 0.02 K/UL (ref 0–0.04)
IMM GRANULOCYTES NFR BLD AUTO: 0.2 % (ref 0–0.5)
LYMPHOCYTES # BLD AUTO: 1.7 K/UL (ref 1–4.8)
LYMPHOCYTES NFR BLD: 20.7 % (ref 18–48)
MAGNESIUM SERPL-MCNC: 1.5 MG/DL (ref 1.6–2.6)
MCH RBC QN AUTO: 28.8 PG (ref 27–31)
MCHC RBC AUTO-ENTMCNC: 32.9 G/DL (ref 32–36)
MCV RBC AUTO: 88 FL (ref 82–98)
MONOCYTES # BLD AUTO: 0.7 K/UL (ref 0.3–1)
MONOCYTES NFR BLD: 8.1 % (ref 4–15)
NEUTROPHILS # BLD AUTO: 5.2 K/UL (ref 1.8–7.7)
NEUTROPHILS NFR BLD: 65.2 % (ref 38–73)
NRBC BLD-RTO: 0 /100 WBC
PHOSPHATE SERPL-MCNC: 2.8 MG/DL (ref 2.7–4.5)
PLATELET # BLD AUTO: 227 K/UL (ref 150–450)
PMV BLD AUTO: 9.3 FL (ref 9.2–12.9)
POCT GLUCOSE: 131 MG/DL (ref 70–110)
POCT GLUCOSE: 137 MG/DL (ref 70–110)
POCT GLUCOSE: 221 MG/DL (ref 70–110)
POCT GLUCOSE: 222 MG/DL (ref 70–110)
POTASSIUM SERPL-SCNC: 4.2 MMOL/L (ref 3.5–5.1)
PROT SERPL-MCNC: 6 G/DL (ref 6–8.4)
RBC # BLD AUTO: 4.1 M/UL (ref 4.6–6.2)
SODIUM SERPL-SCNC: 139 MMOL/L (ref 136–145)
WBC # BLD AUTO: 8.05 K/UL (ref 3.9–12.7)

## 2022-03-04 PROCEDURE — 99232 PR SUBSEQUENT HOSPITAL CARE,LEVL II: ICD-10-PCS | Mod: HCNC,,, | Performed by: NURSE PRACTITIONER

## 2022-03-04 PROCEDURE — 97110 THERAPEUTIC EXERCISES: CPT

## 2022-03-04 PROCEDURE — 93010 EKG 12-LEAD: ICD-10-PCS | Mod: HCNC,,, | Performed by: INTERNAL MEDICINE

## 2022-03-04 PROCEDURE — 36415 COLL VENOUS BLD VENIPUNCTURE: CPT | Mod: HCNC | Performed by: STUDENT IN AN ORGANIZED HEALTH CARE EDUCATION/TRAINING PROGRAM

## 2022-03-04 PROCEDURE — 83735 ASSAY OF MAGNESIUM: CPT | Mod: HCNC | Performed by: STUDENT IN AN ORGANIZED HEALTH CARE EDUCATION/TRAINING PROGRAM

## 2022-03-04 PROCEDURE — 85025 COMPLETE CBC W/AUTO DIFF WBC: CPT | Mod: HCNC | Performed by: STUDENT IN AN ORGANIZED HEALTH CARE EDUCATION/TRAINING PROGRAM

## 2022-03-04 PROCEDURE — 97530 THERAPEUTIC ACTIVITIES: CPT | Mod: HCNC

## 2022-03-04 PROCEDURE — 12000002 HC ACUTE/MED SURGE SEMI-PRIVATE ROOM: Mod: HCNC

## 2022-03-04 PROCEDURE — 84100 ASSAY OF PHOSPHORUS: CPT | Mod: HCNC | Performed by: STUDENT IN AN ORGANIZED HEALTH CARE EDUCATION/TRAINING PROGRAM

## 2022-03-04 PROCEDURE — 25000003 PHARM REV CODE 250: Mod: HCNC | Performed by: STUDENT IN AN ORGANIZED HEALTH CARE EDUCATION/TRAINING PROGRAM

## 2022-03-04 PROCEDURE — 99233 PR SUBSEQUENT HOSPITAL CARE,LEVL III: ICD-10-PCS | Mod: HCNC,95,CR, | Performed by: INTERNAL MEDICINE

## 2022-03-04 PROCEDURE — 93005 ELECTROCARDIOGRAM TRACING: CPT | Mod: HCNC

## 2022-03-04 PROCEDURE — 25000003 PHARM REV CODE 250: Mod: HCNC | Performed by: INTERNAL MEDICINE

## 2022-03-04 PROCEDURE — 27000207 HC ISOLATION: Mod: HCNC

## 2022-03-04 PROCEDURE — 97112 NEUROMUSCULAR REEDUCATION: CPT | Mod: HCNC

## 2022-03-04 PROCEDURE — 99233 SBSQ HOSP IP/OBS HIGH 50: CPT | Mod: HCNC,95,CR, | Performed by: INTERNAL MEDICINE

## 2022-03-04 PROCEDURE — 93010 ELECTROCARDIOGRAM REPORT: CPT | Mod: HCNC,,, | Performed by: INTERNAL MEDICINE

## 2022-03-04 PROCEDURE — 80053 COMPREHEN METABOLIC PANEL: CPT | Mod: HCNC | Performed by: STUDENT IN AN ORGANIZED HEALTH CARE EDUCATION/TRAINING PROGRAM

## 2022-03-04 PROCEDURE — 99232 SBSQ HOSP IP/OBS MODERATE 35: CPT | Mod: HCNC,,, | Performed by: NURSE PRACTITIONER

## 2022-03-04 RX ORDER — LANOLIN ALCOHOL/MO/W.PET/CERES
400 CREAM (GRAM) TOPICAL 2 TIMES DAILY
Status: DISCONTINUED | OUTPATIENT
Start: 2022-03-04 | End: 2022-03-10 | Stop reason: HOSPADM

## 2022-03-04 RX ADMIN — METOPROLOL TARTRATE 25 MG: 25 TABLET, FILM COATED ORAL at 09:03

## 2022-03-04 RX ADMIN — INSULIN ASPART 5 UNITS: 100 INJECTION, SOLUTION INTRAVENOUS; SUBCUTANEOUS at 08:03

## 2022-03-04 RX ADMIN — APIXABAN 5 MG: 5 TABLET, FILM COATED ORAL at 09:03

## 2022-03-04 RX ADMIN — GABAPENTIN 200 MG: 100 CAPSULE ORAL at 09:03

## 2022-03-04 RX ADMIN — DICLOFENAC SODIUM 4 G: 10 GEL TOPICAL at 04:03

## 2022-03-04 RX ADMIN — ACETAMINOPHEN 650 MG: 325 TABLET ORAL at 04:03

## 2022-03-04 RX ADMIN — MULTIPLE VITAMINS W/ MINERALS TAB 1 TABLET: TAB at 08:03

## 2022-03-04 RX ADMIN — CLOPIDOGREL 75 MG: 75 TABLET, FILM COATED ORAL at 08:03

## 2022-03-04 RX ADMIN — INSULIN ASPART 5 UNITS: 100 INJECTION, SOLUTION INTRAVENOUS; SUBCUTANEOUS at 01:03

## 2022-03-04 RX ADMIN — OXYCODONE HYDROCHLORIDE AND ACETAMINOPHEN 500 MG: 500 TABLET ORAL at 08:03

## 2022-03-04 RX ADMIN — ACETAMINOPHEN 650 MG: 325 TABLET ORAL at 08:03

## 2022-03-04 RX ADMIN — GABAPENTIN 200 MG: 100 CAPSULE ORAL at 08:03

## 2022-03-04 RX ADMIN — ATORVASTATIN CALCIUM 40 MG: 20 TABLET, FILM COATED ORAL at 08:03

## 2022-03-04 RX ADMIN — PANTOPRAZOLE SODIUM 40 MG: 40 TABLET, DELAYED RELEASE ORAL at 09:03

## 2022-03-04 RX ADMIN — LACOSAMIDE 100 MG: 100 TABLET, FILM COATED ORAL at 08:03

## 2022-03-04 RX ADMIN — Medication 400 MG: at 11:03

## 2022-03-04 RX ADMIN — DICLOFENAC SODIUM 4 G: 10 GEL TOPICAL at 09:03

## 2022-03-04 RX ADMIN — ASPIRIN 81 MG: 81 TABLET, COATED ORAL at 08:03

## 2022-03-04 RX ADMIN — METOPROLOL TARTRATE 25 MG: 25 TABLET, FILM COATED ORAL at 08:03

## 2022-03-04 RX ADMIN — INSULIN ASPART 2 UNITS: 100 INJECTION, SOLUTION INTRAVENOUS; SUBCUTANEOUS at 08:03

## 2022-03-04 RX ADMIN — INSULIN ASPART 2 UNITS: 100 INJECTION, SOLUTION INTRAVENOUS; SUBCUTANEOUS at 01:03

## 2022-03-04 RX ADMIN — OXYCODONE 5 MG: 5 TABLET ORAL at 08:03

## 2022-03-04 RX ADMIN — INSULIN ASPART 5 UNITS: 100 INJECTION, SOLUTION INTRAVENOUS; SUBCUTANEOUS at 06:03

## 2022-03-04 RX ADMIN — DICLOFENAC SODIUM 4 G: 10 GEL TOPICAL at 01:03

## 2022-03-04 RX ADMIN — AMIODARONE HYDROCHLORIDE 200 MG: 200 TABLET ORAL at 08:03

## 2022-03-04 RX ADMIN — LIDOCAINE 1 PATCH: 50 PATCH CUTANEOUS at 04:03

## 2022-03-04 RX ADMIN — GABAPENTIN 200 MG: 100 CAPSULE ORAL at 04:03

## 2022-03-04 RX ADMIN — PANTOPRAZOLE SODIUM 40 MG: 40 TABLET, DELAYED RELEASE ORAL at 08:03

## 2022-03-04 RX ADMIN — DICLOFENAC SODIUM 4 G: 10 GEL TOPICAL at 08:03

## 2022-03-04 RX ADMIN — Medication 6 MG: at 09:03

## 2022-03-04 RX ADMIN — APIXABAN 5 MG: 5 TABLET, FILM COATED ORAL at 08:03

## 2022-03-04 NOTE — ASSESSMENT & PLAN NOTE
In setting of COVID infection. Resolved upon step-down from MICU. On RA.   maintain SpO2 88-92% given hx of COPD  -wean oxygen as tolerated

## 2022-03-04 NOTE — ASSESSMENT & PLAN NOTE
Hx of CAD s/p placement of 2 LAUREN in RCA in 01/09/2022.   - Continue home ASA, Plavix and statin

## 2022-03-04 NOTE — SUBJECTIVE & OBJECTIVE
"Interval HPI:   Overnight events: No acute events overnight. Patient on the IMTA Unit in room 76708/22496 A. Blood glucose stable. BG at, above, and below goal on current insulin regimen (SSI, prandial, and basal insulin ). Steroid use- None.    Renal function- Normal   Vasopressors-  None       Diet diabetic [facility];  Calorie     Eatin%  Nausea: No  Hypoglycemia and intervention: No  Fever: No  TPN and/or TF: No    BP (!) 153/74 (BP Location: Right arm, Patient Position: Lying)   Pulse 69   Temp 98.1 °F (36.7 °C) (Oral)   Resp 16   Ht 6' 2" (1.88 m)   Wt 76.4 kg (168 lb 6.9 oz)   SpO2 95%   BMI 21.63 kg/m²     Labs Reviewed and Include    Recent Labs   Lab 22  0327   *   CALCIUM 8.1*   ALBUMIN 1.8*   PROT 6.0      K 4.2   CO2 26      BUN 16   CREATININE 0.9   ALKPHOS 106   ALT 11   AST 20   BILITOT 0.5     Lab Results   Component Value Date    WBC 8.05 2022    HGB 11.8 (L) 2022    HCT 35.9 (L) 2022    MCV 88 2022     2022     No results for input(s): TSH, FREET4 in the last 168 hours.  Lab Results   Component Value Date    HGBA1C 11.5 (H) 2022       Nutritional status:   Body mass index is 21.63 kg/m².  Lab Results   Component Value Date    ALBUMIN 1.8 (L) 2022    ALBUMIN 1.9 (L) 2022    ALBUMIN 1.7 (L) 2022     No results found for: PREALBUMIN    Estimated Creatinine Clearance: 73.1 mL/min (based on SCr of 0.9 mg/dL).    Accu-Checks  Recent Labs     22  1526 22  2102 22  0545 22  0627 22  0655 22  0815 22  1150 22  1651 22  2026 22  0737   POCTGLUCOSE 285* 105 40* 54* 84 141* 364* 476* 256* 221*       Current Medications and/or Treatments Impacting Glycemic Control  Immunotherapy:    Immunosuppressants       None          Steroids:   Hormones (From admission, onward)                Start     Stop Route Frequency Ordered    22 1824  " melatonin tablet 6 mg         -- Oral Nightly PRN 02/23/22 1724          Pressors:    Autonomic Drugs (From admission, onward)                None          Hyperglycemia/Diabetes Medications:   Antihyperglycemics (From admission, onward)                Start     Stop Route Frequency Ordered    03/03/22 2100  insulin detemir U-100 pen 8 Units         -- SubQ Nightly 03/03/22 1136    03/03/22 1237  insulin aspart U-100 pen 0-5 Units         -- SubQ Before meals & nightly PRN 03/03/22 1137    03/01/22 1130  insulin aspart U-100 pen 5 Units         -- SubQ 3 times daily with meals 03/01/22 1019

## 2022-03-04 NOTE — PROGRESS NOTES
[facility] ([place] [id number])  Endocrinology  Progress Note    Admit Date: 2/19/2022     Reason for Consult: Management of T2DM, Hyperglycemia     Diabetes diagnosis year: 2010    Home Diabetes Medications:  Novolog 9 TIDWM + SSI; Levemir 20 units qd; Metformin 1000 mg BID    How often checking glucose at home? 1-3 x day   BG readings on regimen: 100-150's  Hypoglycemia on the regimen?  No  Missed doses on regimen?  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 History was not obtained from patient due to encephalopathy. Patient's daughter at bedside reports the patient was recently in the ED on 2/17 for a mechanical fall after being discharged from rehab the same day. CTH and cervical spine revealed  evolving late subacute infarct involving the right frontal lobe with questionable petechial hemorrhage. Vascular neurology evaluated the patient and cleared him for discharge from without any new interventions. He was also tested positive for COVID-19 and was discharged yesterday from the ED. He subsequently received one dose of sotrovimab infusion for COVID and was in a normal state of health until this morning when his daughter found him lethargic and barely responsive prompting her to bring him to the ED. She reports the patient had no complaints prior to developing his AMS. Of note, the patient was recently admitted to [facility] from 01/25 through 02/13 for AMS concerning for CVA, however he was treated for metabolic encephalopathy 2/2 to DKA/HHS, sepsis and BRENDAN. Upon arrival to the ED, he was placed on 6L NC,  "normotensive and tachycardic. Lactic 11.5, WBC 17.8, Glucose 1109, pH 7.17, Co2 15.6 HCO3 <5, AG unable to calculate. sCr 3.1. CXR with intersitial opacities. He received ~4L of IVF, vancomycin, zosyn, initiated on insulin shifted for hyperkalemia and calcium for cardioprotection. ECG without noticeable ischemic changes. CTH without new changes- discussed findings with radiology. Patient was admitted to the MICU for further management. Endocrine consulted to manage type 2 diabetes and hyperglycemia. Since admission patient has had fluctuations in BG control.             Interval HPI:   Overnight events: No acute events overnight. Patient on the IMTA Unit in room 75940/23813 A. Blood glucose stable. BG at, above, and below goal on current insulin regimen (SSI, prandial, and basal insulin ). Steroid use- None.    Renal function- Normal   Vasopressors-  None       Diet diabetic [facility];  Calorie     Eatin%  Nausea: No  Hypoglycemia and intervention: No  Fever: No  TPN and/or TF: No    BP (!) 153/74 (BP Location: Right arm, Patient Position: Lying)   Pulse 69   Temp 98.1 °F (36.7 °C) (Oral)   Resp 16   Ht 6' 2" (1.88 m)   Wt 76.4 kg (168 lb 6.9 oz)   SpO2 95%   BMI 21.63 kg/m²     Labs Reviewed and Include    Recent Labs   Lab 22  0327   *   CALCIUM 8.1*   ALBUMIN 1.8*   PROT 6.0      K 4.2   CO2 26      BUN 16   CREATININE 0.9   ALKPHOS 106   ALT 11   AST 20   BILITOT 0.5     Lab Results   Component Value Date    WBC 8.05 2022    HGB 11.8 (L) 2022    HCT 35.9 (L) 2022    MCV 88 2022     2022     No results for input(s): TSH, FREET4 in the last 168 hours.  Lab Results   Component Value Date    HGBA1C 11.5 (H) 2022       Nutritional status:   Body mass index is 21.63 kg/m².  Lab Results   Component Value Date    ALBUMIN 1.8 (L) 2022    ALBUMIN 1.9 (L) 2022    ALBUMIN 1.7 (L) 2022     No results found for: " PREALBUMIN    Estimated Creatinine Clearance: 73.1 mL/min (based on SCr of 0.9 mg/dL).    Accu-Checks  Recent Labs     03/02/22  1526 03/02/22  2102 03/03/22  0545 03/03/22  0627 03/03/22  0655 03/03/22  0815 03/03/22  1150 03/03/22  1651 03/03/22  2026 03/04/22  0737   POCTGLUCOSE 285* 105 40* 54* 84 141* 364* 476* 256* 221*       Current Medications and/or Treatments Impacting Glycemic Control  Immunotherapy:    Immunosuppressants       None          Steroids:   Hormones (From admission, onward)                Start     Stop Route Frequency Ordered    02/23/22 1824  melatonin tablet 6 mg         -- Oral Nightly PRN 02/23/22 1724          Pressors:    Autonomic Drugs (From admission, onward)                None          Hyperglycemia/Diabetes Medications:   Antihyperglycemics (From admission, onward)                Start     Stop Route Frequency Ordered    03/03/22 2100  insulin detemir U-100 pen 8 Units         -- SubQ Nightly 03/03/22 1136    03/03/22 1237  insulin aspart U-100 pen 0-5 Units         -- SubQ Before meals & nightly PRN 03/03/22 1137    03/01/22 1130  insulin aspart U-100 pen 5 Units         -- SubQ 3 times daily with meals 03/01/22 1019            ASSESSMENT and PLAN    * Encephalopathy, metabolic    Managed per primary team  Avoid hypoglycemia      Type 2 diabetes mellitus with hyperglycemia, with long-term current use of insulin  Endocrinology consulted for BG management.   BG goal 140-180    - Levemir Flex Pen 8 units nightly   - Novolog (aspart) insulin 5 Units SQ TIDWM and prn for BG excursions [facility] (150/25).   - BG checks AC/HS      ** Please notify Endocrine for any change and/or advance in diet**  ** Please call Endocrine for any BG related issues **    Discharge Planning:   TBD. Please notify endocrinology prior to discharge.        Diabetic ketoacidosis with coma associated with type 2 diabetes mellitus  Now resolved.     Will continue to monitor in the event of BG excursions.        COVID-19 virus infection  Infection may elevate BG readings  Managed per primary team               Cresencio Duke, DNP, FNP  Endocrinology  [facility] - [facility] ([place])

## 2022-03-04 NOTE — SUBJECTIVE & OBJECTIVE
Telemedicine  This service was provided by Virtual Visit.    Patient was transferred to [facility] on:  2/28/2022     Chief Complaint   Patient presents with    Vomiting     Vomiting and altered mental status all day. COVID+     The patient location is: [address]20/[address] A   Admitted 2/19/2022  6:57 PM  Present with the patient at the time of the telemed/virtual assessment: N/A    Interval History / Events Overnight:   The patient is able to provide adequate history. Additional history was obtained from past medical records. No significant events reported by Nursing. Patient had episode of chest pain at rest after breakfast this AM. Spontaneously resolved.   Patient complains of fatigue. Symptoms have been unchanged since yesterday. Associated symptoms include: malaise. Symptoms are stable.     Lab test(s) reviewed: H&H stable  Other diagnostic test reviewed EKG    Review of Systems   Constitutional:  Negative for fever.   Respiratory:  Positive for cough.    Skin:  Positive for wound.     Objective:     Vital Signs (Most Recent):  Temp: 98.8 °F (37.1 °C) (03/04/22 1217)  Pulse: 64 (03/04/22 1315)  Resp: 18 (03/04/22 1217)  BP: (!) 142/67 (03/04/22 1217)  SpO2: 96 % (03/04/22 1315) Vital Signs (24h Range):  Temp:  [98 °F (36.7 °C)-98.8 °F (37.1 °C)] 98.8 °F (37.1 °C)  Pulse:  [62-80] 64  Resp:  [16-18] 18  SpO2:  [91 %-96 %] 96 %  BP: (126-156)/(66-94) 142/67     Weight: 76.4 kg (168 lb 6.9 oz)  Body mass index is 21.63 kg/m².    Intake/Output Summary (Last 24 hours) at 3/4/2022 1354  Last data filed at 3/4/2022 1000  Gross per 24 hour   Intake 358 ml   Output 500 ml   Net -142 ml        Physical Exam  Constitutional:       General: He is not in acute distress.     Appearance: Normal appearance.   Eyes:      General: Lids are normal. No scleral icterus.        Right eye: No discharge.         Left eye: No discharge.      Conjunctiva/sclera: Conjunctivae normal.   Neck:      Trachea: Phonation normal.    Cardiovascular:      Rate and Rhythm: Normal rate.      Comments: Monitor / Vital signs reviewed at time of visit  Pulmonary:      Effort: Pulmonary effort is normal. No tachypnea, accessory muscle usage or respiratory distress.   Abdominal:      General: There is no distension.   Skin:     Coloration: Skin is not cyanotic.   Neurological:      Mental Status: He is alert. He is not disoriented.   Psychiatric:         Attention and Perception: Attention normal.         Mood and Affect: Affect normal.         Behavior: Behavior is cooperative.       Significant Labs:     Recent Labs   Lab 12/29/21  0810 01/26/22  1512   HGBA1C 12.3* 11.5*       Recent Labs   Lab 03/03/22 2026 03/04/22  0737 03/04/22  1215   POCTGLUCOSE 256* 221* 222*       Recent Labs   Lab 03/02/22 0311 03/03/22 0431 03/04/22  0327   WBC 8.22 9.72 8.05   HGB 11.3* 11.8* 11.8*   HCT 35.5* 36.6* 35.9*    260 227       Recent Labs   Lab 03/02/22 0311 03/03/22 0431 03/04/22  0327   GRAN 62.2  5.1 61.1  5.9 65.2  5.2   LYMPH 20.8  1.7 24.1  2.3 20.7  1.7   MONO 9.5  0.8 9.0  0.9 8.1  0.7   EOS 0.5 0.5 0.4       Recent Labs   Lab 03/02/22 0311 03/03/22 0431 03/04/22  0327    136 139   K 3.7 3.9 4.2    104 103   CO2 24 24 26   BUN 19 17 16   CREATININE 0.8 0.7 0.9   GLU 85 38* 173*   CALCIUM 8.0* 8.1* 8.1*   ALBUMIN 1.7* 1.9* 1.8*   MG 1.6 1.6 1.5*   PHOS 3.1 2.5* 2.8       Recent Labs   Lab 03/02/22 0311 03/03/22 0431 03/04/22  0327   ALKPHOS 90 102 106   ALT 8* 11 11   AST 17 26 20   PROT 5.7* 6.1 6.0   BILITOT 0.4 0.5 0.5       Procalcitonin (ng/mL)   Date Value   02/19/2022 0.93 (H)     Lactate (Lactic Acid) (mmol/L)   Date Value   02/20/2022 4.4 (HH)   02/20/2022 4.9 (HH)   02/19/2022 11.5 (HH)   01/25/2022 7.0 (HH)   01/25/2022 10.5 (HH)     BNP (pg/mL)   Date Value   02/19/2022 481 (H)   01/25/2022 658 (H)   02/01/2019 60   11/27/2018 33   07/02/2018 87     Sed Rate   Date Value   02/19/2022 92 mm/Hr (H)    06/01/2007 3 mm/hr     D-Dimer (mg/L FEU)   Date Value   03/03/2022 0.50 (H)   03/01/2022 0.41   02/27/2022 0.37   02/25/2022 0.47   02/21/2022 0.50 (H)   02/19/2022 0.84 (H)     Ferritin (ng/mL)   Date Value   03/03/2022 300   03/01/2022 274   02/27/2022 312 (H)   02/25/2022 337 (H)   02/21/2022 481 (H)   02/19/2022 564 (H)     LD (U/L)   Date Value   03/03/2022 175   03/01/2022 179   02/27/2022 217   02/25/2022 279 (H)   02/21/2022 294 (H)   02/19/2022 318 (H)     Troponin I (ng/mL)   Date Value   02/21/2022 0.470 (H)   02/21/2022 0.654 (H)   02/20/2022 0.704 (H)   02/20/2022 0.390 (H)   02/19/2022 0.037 (H)   01/26/2022 0.820 (H)   01/26/2022 1.042 (H)   01/26/2022 1.494 (H)     CPK (U/L)   Date Value   02/19/2022 95   01/13/2022 271 (H)   10/21/2011 188   01/08/2008 140   06/01/2007 83   05/21/2007 403 (H)   04/30/2007 293 (H)     Results for orders placed or performed in visit on 05/05/14   Vitamin D   Result Value Ref Range    Vit D, 25-Hydroxy 24 (L) 30 - 96 ng/mL     POC Rapid COVID (no units)   Date Value   02/17/2022 Positive (A)   01/25/2022 Negative   01/12/2022 Negative   01/09/2022 Negative     ECG Results              EKG 12-lead (Final result)  Result time 02/20/22 09:32:21      Final result by Interface, Lab In [facility] (02/20/22 09:32:21)                   Narrative:    Test Reason :     Vent. Rate : 126 BPM     Atrial Rate : 120 BPM     P-R Int : 000 ms          QRS Dur : 162 ms      QT Int : 412 ms       P-R-T Axes : 000 059 -31 degrees     QTc Int : 596 ms    Wide QRS tachycardia  Right bundle branch block  T wave abnormality, consider inferior ischemia  Abnormal ECG  When compared with ECG of 19-FEB-2022 19:09,  Wide QRS tachycardia has replaced Junctional rhythm or SVT  Confirmed by Lencho BARROS MD ([id number]) on 2/20/2022 9:32:14 AM    Referred By: [email or web address]   SELF           Confirmed By:Lencho BARROS MD                                     Repeat EKG 12-lead (Final result)  Result time 02/20/22  09:34:32      Final result by Interface, Lab In [facility] (02/20/22 09:34:32)                   Narrative:    Test Reason :     Vent. Rate : 118 BPM     Atrial Rate : 117 BPM     P-R Int : 000 ms          QRS Dur : 118 ms      QT Int : 374 ms       P-R-T Axes : 000 056 -21 degrees     QTc Int : 524 ms    Accelerated Junctional rhythm vs SVT with artifact  Nonspecific intraventricular conduction delay  ST and T wave abnormality, consider inferior ischemia  Prolonged QT  Abnormal ECG  When compared with ECG of 27-JAN-2022 00:52,  Rhythm changed  Vent. rate has increased BY  50 BPM  Questionable change in QRS duration  Confirmed by Lencho BARROS MD ([id number]) on 2/20/2022 9:34:22 AM    Referred By: [email or web address]   SELF           Confirmed By:Lencho BARROS MD                                  X-Ray Chest AP Portable  Narrative: EXAMINATION:  XR CHEST AP PORTABLE    CLINICAL HISTORY:  Sob, cough;    TECHNIQUE:  Single frontal view of the chest was performed.    COMPARISON:  Chest radiograph February 19, 2022    FINDINGS:  AP portable chest radiographic examination is submitted.  When accounting for difference in position and technique the appearance of the cardiomediastinal silhouette is thought stable.    There appears to be mild prominence of the central pulmonary vascular, there is bilateral pattern of pulmonary opacity consistent with interstitial and ground-glass and patchy alveolar infiltrates, this appears superimposed on chronic change.  There is no evidence for large pleural effusion there is minimal blunting at the right costophrenic angle that may relate to a small amount of pleural fluid.  There is no evidence for pneumothorax.  The osseous structures demonstrate chronic change.  Impression: Mild prominence of the central pulmonary vascular, there is appearance of bilateral opacity consistent with interstitial and ground-glass and patchy alveolar infiltrates.    Electronically signed by: Frantz  John  Date:    02/25/2022  Time:    22:03      Labs and Imaging within the last 24 hours listed above were reviewed.       Diet: Diet diabetic [facility]; 2000 Calorie  Significant LDAs:   IV Access Type: Peripheral  Urinary Catheter Indication if present: Patient Does Not Have Urinary Catheter  Other Lines/Tubes/Drains:    HIGH RISK CONDITION(S):   Patient has a condition that poses threat to life and bodily function: Severe Respiratory Distress     Goals of Care:    Previous admission:  2/17/22  Likely prognosis:  Fair  Code Status: DNR  Comfort Only: No  Hospice: No  Goals at discharge: remain at home, with physician follow-up    Discharge Planning   ALLIE: 3/9/2022     Code Status: DNR   Is the patient medically ready for discharge?: No    Reason for patient still in hospital (select all that apply): Patient trending condition and Pending disposition  Discharge Plan A: Rehab   Discharge Delays: None known at this time

## 2022-03-04 NOTE — PLAN OF CARE
SW faxed updated clinicals to SNF referrals for review. Patient has no accepting facilities at this time. JASIEL will continue to follow.     Marley Gregg LMSW   - [facility]  Ext. [phone]

## 2022-03-04 NOTE — ASSESSMENT & PLAN NOTE
Viral Pneumonia due to COVID-19  - COVID-19 testing:   -Patient is identified as High risk for severe complications of COVID 19 based on COVID risk score of 8   Initiate standard COVID protocols; COVID-19 testing Collection Date: 8/9/2021 Collection Time:   3:41 PM ,Infection Control notification  and isolation- respiratory, contact and droplet per protocol  COVID vaccinated with booster.   Received 1 dose of sotrovimab infusion 02/18/2022  - Diagnostics: Trend LDH, CRP, Ferritin, D-dimer Q48hrs if stable, more frequently if patient decompensating.     Lymphopenia, hyponatremia, hyperferritinemia, elevated troponin, elevated d-dimer, age, and medical comorbidities are significant predictors of poor clinical outcome     - Management: Per [facility] COVID Treatment Protocol (4/15/20)       - Monitoring:          - Telemetry & Continuous Pulse Oximetry       - Targeted therapy Remdesivir, 200mg IV x1, followed by 100mg IV daily x5 days total,  - Holding dexamethasone PO/IV 6mg daily x10 days in the setting of DKA/HHS.-  -Weaning 02 as tolerated  - Anticoagulation, Patient admitted to critical care up initial presentation and on triple therapy - continue home apixaban dose anticoagulation      - Antibiotics:  - if indications, CXR findings, elevated procal. See protocol for alternatives.   - Discontinue early if low concern for bacterial co-infection          - Initiated on IV vanc, zosyn and doxycycline (unable to use azithromycin due to prolong QTc)     - Nutrition:           - Multivitamin PO daily          - Add Boost supplement          - Vitamin D 1000IU daily if deficient          - Ascorbic acid 500mg PO bid    - Supportive Care:          - acetaminophen 650mg PO Q6hr PRN fever/headache          - loperamide PRN viral diarrhea    Anticipate end isolation on 3/9/2022

## 2022-03-04 NOTE — PT/OT/SLP PROGRESS
Physical Therapy      Patient Name:  Kamar Muñoz   MRN:  [id number]    Patient not seen today secondary to Patient unwilling to participate. Will follow-up at next scheduled visit per PT POC.

## 2022-03-04 NOTE — ASSESSMENT & PLAN NOTE
Upon admission:  Organ dysfunction indicated by Acute kidney injury, Encephalopathy  and Acute respiratory failure  Source- Unclear. CXR with diffuse interstitial opacities, however no consolidation noted. UA with pyuria, without elevated leuks. Blood cultures pending. Possibly due to sacral wound, although doesn't look grossly infected.     Started on broad spectrum abx at admission with vanc/Zosyn/doxy.    - vancomycin discontinued as no MRSA has isolated.  No infectious source found but leukocytosis persists.  Consider de-escalation further based on continued improvement.     F/u Urine and blood cultures  - Wound care consulted for sacral decubitus wound. Appreciate recs.  -lactic acid overall trend improved

## 2022-03-04 NOTE — ASSESSMENT & PLAN NOTE
Endocrinology consulted for BG management.   BG goal 140-180    - Levemir Flex Pen 8 units nightly   - Novolog (aspart) insulin 5 Units SQ TIDWM and prn for BG excursions MDC SSI (150/25).   - BG checks AC/HS      ** Please notify Endocrine for any change and/or advance in diet**  ** Please call Endocrine for any BG related issues **    Discharge Planning:   TBD. Please notify endocrinology prior to discharge.

## 2022-03-04 NOTE — SUBJECTIVE & OBJECTIVE
"Interval HPI:   Overnight events: No acute events overnight. Patient on the IMTA Unit in room [address]64/43033 A. Blood glucose stable. BG at, above, and below goal on current insulin regimen (SSI, prandial, and basal insulin ). Steroid use- None.    Renal function- Normal   Vasopressors-  None       Diet diabetic [facility];  Calorie     Eatin%  Nausea: No  Hypoglycemia and intervention: Yes; likely due to excessive basal insulin dosing. Adjustments made to basal insulin. Patient responded to hypoglycemic tx.   Fever: No  TPN and/or TF: No    BP (!) 133/94 (BP Location: Right arm, Patient Position: Lying)   Pulse 70   Temp 98.2 °F (36.8 °C) (Oral)   Resp 18   Ht 6' 2" (1.88 m)   Wt 76.4 kg (168 lb 6.9 oz)   SpO2 (!) 92%   BMI 21.63 kg/m²     Labs Reviewed and Include    Recent Labs   Lab 22  0431   GLU 38*   CALCIUM 8.1*   ALBUMIN 1.9*   PROT 6.1      K 3.9   CO2 24      BUN 17   CREATININE 0.7   ALKPHOS 102   ALT 11   AST 26   BILITOT 0.5     Lab Results   Component Value Date    WBC 9.72 2022    HGB 11.8 (L) 2022    HCT 36.6 (L) 2022    MCV 86 2022     2022     No results for input(s): TSH, FREET4 in the last 168 hours.  Lab Results   Component Value Date    HGBA1C 11.5 (H) 2022       Nutritional status:   Body mass index is 21.63 kg/m².  Lab Results   Component Value Date    ALBUMIN 1.9 (L) 2022    ALBUMIN 1.7 (L) 2022    ALBUMIN 1.7 (L) 2022     No results found for: PREALBUMIN    Estimated Creatinine Clearance: 94 mL/min (based on SCr of 0.7 mg/dL).    Accu-Checks  Recent Labs     22  0745 22  1119 22  1526 22  2102 22  0545 22  0627 22  0655 22  0815 22  1150 22  1651   POCTGLUCOSE 98 304* 285* 105 40* 54* 84 141* 364* 476*       Current Medications and/or Treatments Impacting Glycemic Control  Immunotherapy:    Immunosuppressants       None      "     Steroids:   Hormones (From admission, onward)                Start     Stop Route Frequency Ordered    02/23/22 1824  melatonin tablet 6 mg         -- Oral Nightly PRN 02/23/22 1724          Pressors:    Autonomic Drugs (From admission, onward)                None          Hyperglycemia/Diabetes Medications:   Antihyperglycemics (From admission, onward)                Start     Stop Route Frequency Ordered    03/03/22 2100  insulin detemir U-100 pen 8 Units         -- SubQ Nightly 03/03/22 1136    03/03/22 1237  insulin aspart U-100 pen 0-5 Units         -- SubQ Before meals & nightly PRN 03/03/22 1137    03/01/22 1130  insulin aspart U-100 pen 5 Units         -- SubQ 3 times daily with meals 03/01/22 1019

## 2022-03-04 NOTE — ASSESSMENT & PLAN NOTE
Follows up with Pulmonary clinic in [place]. Last seen in December and was evaluated for pulmonary masses. Deemed to have mild COPD per notes. Not on any maintenance therapy.  - Continue albuterol inhaler

## 2022-03-04 NOTE — PLAN OF CARE
No acute events throughout night. Pt AAO X 4; able to express needs.  No c/o pain.  Wound care to sacrum.  Discharge to SNF or [facility] pending.  Safety maintained.  Bed in low position,  call  light in reach.

## 2022-03-04 NOTE — ASSESSMENT & PLAN NOTE
History of paroxysmal atrial fibrillation on home  Metoprolol XL 50 mg daily, diltiazem  mg daily and amiodarone 200 mg daily.  - Continue po apixaban  - Continue amiodarone  - metoprolol and diltiazem held in MICU  in the setting of sepsis.   - resumed BB and CBB with titration as tolerated per BP

## 2022-03-04 NOTE — PROGRESS NOTES
[facility] - [facility]ensive [facility] ([place] [address])  [facility] Medicine  Telemedicine Progress Note    Patient Name: Kamar Muñoz  MRN: [id number]  Patient Class: IP- Inpatient   Admission Date: 2/19/2022  Length of Stay: 12 days  Attending Physician: Tiffany Awad MD  Primary Care Provider: Basim Guerrero MD    Subjective:     Principal Problem:Encephalopathy, metabolic    HPI:  Kamar Muñoz is a 76 year old male with past medical history of CAD s/p 2 LAUREN in RCA (Jan 2022), HTN, HLD, p. A. 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 History was not obtained from patient due to encephalopathy. Patient's daughter at bedside reports the patient was recently in the ED on 2/17 for a mechanical fall after being discharged from rehab the same day. CTH and cervical spine revealed  evolving late subacute infarct involving the right frontal lobe with questionable petechial hemorrhage. Vascular neurology evaluated the patient and cleared him for discharge from without any new interventions. He was also tested positive for COVID-19 and was discharged yesterday from the ED. He subsequently received one dose of sotrovimab infusion for COVID and was in a normal state of health until this morning when his daughter found him lethargic and barely responsive prompting her to bring him to the ED. She reports the patient had no complaints prior to developing his AMS. Of note, the patient was recently admitted to [facility] from 01/25 through 02/13 for AMS concerning for CVA, however he was  treated for metabolic encephalopathy 2/2 to DKA/HHS, sepsis and BRENDAN.      Upon arrival to the ED, he was placed on 6L NC, normotensive and tachycardic. Lactic 11.5, WBC 17.8, Glucose 1109, pH 7.17, Co2 15.6 HCO3 <5, AG unable to calculate. sCr 3.1. CXR with intersitial opacities. He received ~4L of IVF, vancomycin, zosyn, initiated on insulin shifted for hyperkalemia and calcium for cardioprotection. ECG without noticeable ischemic changes. CTH without new changes- discussed findings with radiology. Patient was admitted to the MICU for further management.        Overview/Hospital Course:  ICU Course:  Placed on remdesivir and broad spectrum IV abx in addition to insulin per DKA protocol. In MICU: Weaned supp O2 to room air; Transitioned to subq insulin; Improvement of AMS. BRENDAN improving gradually. Pt had discussion w/ family in MICU and transitioned from full code to DNR/DNI.  Step down to  initiated 2/21.    Hospital Medicine Course:  Pt w/ episode of CP and hypoglycemia upon stepdown. Ischemic work-up largely unremarkable with trop down trending from admission. Detemir dosing adjusted from BID to qhs. He had additional hypoglycemic episode upon re-uptitration of long-acting insulin from 12 to 15. Dosing decreased to 13u as patient was hyperglycemic to 230s w/12u qhs. Worsening hyperglycemia to 270s- insulin dose modified to 3u TID wm, and detemir 14u qhs.    Episode of abdominal pain and diarrhea 02/25- appeared to be 2/2 antibiotic use. Antibiotics stopped 02/25. Pt w/persistent diarrhea- c diff studies sent- loperamide stopped.     Pt was found to have St 3 sacral pressure ulcer.       Telemedicine  This service was provided by [facility].    Patient was transferred to [facility] Medicine on:  2/28/2022     Chief Complaint   Patient presents with    Vomiting     Vomiting and altered mental status all day. COVID+     The patient location is: [id number]   Admitted 2/19/2022  6:57 PM  Present with  the patient at the time of the telemed/virtual assessment: N/A    Interval History / Events Overnight:   The patient is able to provide adequate history. Additional history was obtained from past medical records. On Call Provider contacted about hypoglycemia  Patient complains of fatigue. Symptoms have improved since yesterday. Associated symptoms include: malaise. Symptoms are decreasing in severity.     Lab test(s) reviewed: H&H stable    Review of Systems   Constitutional:  Negative for fever.   Respiratory:  Positive for cough.      Objective:     Vital Signs (Most Recent):  Temp: 98.6 °F (37 °C) (03/03/22 1148)  Pulse: 71 (03/03/22 1148)  Resp: 19 (03/03/22 1148)  BP: 130/66 (03/03/22 1148)  SpO2: 95 % (03/03/22 1148) Vital Signs (24h Range):  Temp:  [97.6 °F (36.4 °C)-98.9 °F (37.2 °C)] 98.6 °F (37 °C)  Pulse:  [61-78] 71  Resp:  [17-22] 19  SpO2:  [92 %-96 %] 95 %  BP: (115-140)/(66-72) 130/66     Weight: 76.4 kg (168 lb 6.9 oz)  Body mass index is 21.63 kg/m².    Intake/Output Summary (Last 24 hours) at 3/3/2022 1422  Last data filed at 3/3/2022 1000  Gross per 24 hour   Intake 718 ml   Output 700 ml   Net 18 ml      Physical Exam  Constitutional:       General: He is not in acute distress.     Appearance: Normal appearance.   Eyes:      General: Lids are normal. No scleral icterus.        Right eye: No discharge.         Left eye: No discharge.      Conjunctiva/sclera: Conjunctivae normal.   Neck:      Trachea: Phonation normal.   Cardiovascular:      Rate and Rhythm: Normal rate.      Comments: Monitor / Vital signs reviewed at time of visit  Pulmonary:      Effort: Pulmonary effort is normal. No tachypnea, accessory muscle usage or respiratory distress.   Abdominal:      General: There is no distension.   Skin:     Coloration: Skin is not cyanotic.   Neurological:      Mental Status: He is alert. He is not disoriented.   Psychiatric:         Attention and Perception: Attention normal.         Mood and  Affect: Affect normal.         Behavior: Behavior is cooperative.       Significant Labs:     Recent Labs   Lab 12/29/21  0810 01/26/22  1512   HGBA1C 12.3* 11.5*     Recent Labs   Lab 03/03/22  0815 03/03/22  1150 03/03/22  1651   POCTGLUCOSE 141* 364* 476*     Recent Labs   Lab 03/01/22  0257 03/02/22  0311 03/03/22  0431   WBC 7.64 8.22 9.72   HGB 11.2* 11.3* 11.8*   HCT 34.1* 35.5* 36.6*    252 260     Recent Labs   Lab 03/01/22 0257 03/02/22  0311 03/03/22  0431   GRAN 63.0  4.8 62.2  5.1 61.1  5.9   LYMPH 16.9*  1.3 20.8  1.7 24.1  2.3   MONO 10.7  0.8 9.5  0.8 9.0  0.9   EOS 0.6* 0.5 0.5     Recent Labs   Lab 03/01/22 0257 03/02/22  0311 03/03/22  0431    141 136   K 3.8 3.7 3.9    105 104   CO2 22* 24 24   BUN 23 19 17   CREATININE 0.9 0.8 0.7   * 85 38*   CALCIUM 7.8* 8.0* 8.1*   ALBUMIN 1.7* 1.7* 1.9*   MG 1.6 1.6 1.6   PHOS 2.8 3.1 2.5*     Recent Labs   Lab 03/01/22 0257 03/02/22 0311 03/03/22  0431   ALKPHOS 95 90 102   ALT 8* 8* 11   AST 11 17 26   PROT 5.5* 5.7* 6.1   BILITOT 0.5 0.4 0.5     Procalcitonin (ng/mL)   Date Value   02/19/2022 0.93 (H)     Lactate (Lactic Acid) (mmol/L)   Date Value   02/20/2022 4.4 (HH)   02/20/2022 4.9 (HH)   02/19/2022 11.5 (HH)   01/25/2022 7.0 (HH)   01/25/2022 10.5 (HH)     BNP (pg/mL)   Date Value   02/19/2022 481 (H)   01/25/2022 658 (H)   02/01/2019 60   11/27/2018 33   07/02/2018 87     Sed Rate   Date Value   02/19/2022 92 mm/Hr (H)   06/01/2007 3 mm/hr     D-Dimer (mg/L FEU)   Date Value   03/01/2022 0.41   02/27/2022 0.37   02/25/2022 0.47   02/21/2022 0.50 (H)   02/19/2022 0.84 (H)     Ferritin (ng/mL)   Date Value   03/01/2022 274   02/27/2022 312 (H)   02/25/2022 337 (H)   02/21/2022 481 (H)   02/19/2022 564 (H)     LD (U/L)   Date Value   03/01/2022 179   02/27/2022 217   02/25/2022 279 (H)   02/21/2022 294 (H)   02/19/2022 318 (H)     Troponin I (ng/mL)   Date Value   02/21/2022 0.470 (H)   02/21/2022 0.654 (H)    02/20/2022 0.704 (H)   02/20/2022 0.390 (H)   02/19/2022 0.037 (H)   01/26/2022 0.820 (H)   01/26/2022 1.042 (H)   01/26/2022 1.494 (H)     CPK (U/L)   Date Value   02/19/2022 95   01/13/2022 271 (H)   10/21/2011 188   01/08/2008 140   06/01/2007 83   05/21/2007 403 (H)   04/30/2007 293 (H)     Results for orders placed or performed in visit on 05/05/14   Vitamin D   Result Value Ref Range    Vit D, 25-Hydroxy 24 (L) 30 - 96 ng/mL     POC Rapid COVID (no units)   Date Value   02/17/2022 Positive (A)   01/25/2022 Negative   01/12/2022 Negative   01/09/2022 Negative     ECG Results              EKG 12-lead (Final result)  Result time 02/20/22 09:32:21      Final result by Interface, Lab In [facility] (02/20/22 09:32:21)                   Narrative:    Test Reason :     Vent. Rate : 126 BPM     Atrial Rate : 120 BPM     P-R Int : 000 ms          QRS Dur : 162 ms      QT Int : 412 ms       P-R-T Axes : 000 059 -31 degrees     QTc Int : 596 ms    Wide QRS tachycardia  Right bundle branch block  T wave abnormality, consider inferior ischemia  Abnormal ECG  When compared with ECG of 19-FEB-2022 19:09,  Wide QRS tachycardia has replaced Junctional rhythm or SVT  Confirmed by Lencho BARROS MD ([id number]) on 2/20/2022 9:32:14 AM    Referred By: [email or web address]   SELF           Confirmed By:Lencho BARROS MD                                     Repeat EKG 12-lead (Final result)  Result time 02/20/22 09:34:32      Final result by Interface, Lab In [facility] (02/20/22 09:34:32)                   Narrative:    Test Reason :     Vent. Rate : 118 BPM     Atrial Rate : 117 BPM     P-R Int : 000 ms          QRS Dur : 118 ms      QT Int : 374 ms       P-R-T Axes : 000 056 -21 degrees     QTc Int : 524 ms    Accelerated Junctional rhythm vs SVT with artifact  Nonspecific intraventricular conduction delay  ST and T wave abnormality, consider inferior ischemia  Prolonged QT  Abnormal ECG  When compared with ECG of 27-JAN-2022 00:52,  Rhythm changed  Vent.  rate has increased BY  50 BPM  Questionable change in QRS duration  Confirmed by Lencho BARROS MD ([id number]) on 2/20/2022 9:34:22 AM    Referred By: [email or web address]   SELF           Confirmed By:Lencho BARROS MD                                  X-Ray Chest AP Portable  Narrative: EXAMINATION:  XR CHEST AP PORTABLE    CLINICAL HISTORY:  Sob, cough;    TECHNIQUE:  Single frontal view of the chest was performed.    COMPARISON:  Chest radiograph February 19, 2022    FINDINGS:  AP portable chest radiographic examination is submitted.  When accounting for difference in position and technique the appearance of the cardiomediastinal silhouette is thought stable.    There appears to be mild prominence of the central pulmonary vascular, there is bilateral pattern of pulmonary opacity consistent with interstitial and ground-glass and patchy alveolar infiltrates, this appears superimposed on chronic change.  There is no evidence for large pleural effusion there is minimal blunting at the right costophrenic angle that may relate to a small amount of pleural fluid.  There is no evidence for pneumothorax.  The osseous structures demonstrate chronic change.  Impression: Mild prominence of the central pulmonary vascular, there is appearance of bilateral opacity consistent with interstitial and ground-glass and patchy alveolar infiltrates.    Electronically signed by: Frantz Lopez  Date:    02/25/2022  Time:    22:03      Labs and Imaging within the last 24 hours listed above were reviewed.       Diet: Diet diabetic [facility]; 2000 Calorie  Significant LDAs:   IV Access Type: Peripheral  Urinary Catheter Indication if present: Patient Does Not Have Urinary Catheter  Other Lines/Tubes/Drains:    HIGH RISK CONDITION(S):   Patient has a condition that poses threat to life and bodily function: Severe Respiratory Distress     Goals of Care:    Previous admission:  2/17/22  Likely prognosis:  Fair  Code Status: DNR  Comfort Only: No  Hospice:  No  Goals at discharge: remain at home, with physician follow-up    Discharge Planning   ALLIE: 3/9/2022     Code Status: DNR   Is the patient medically ready for discharge?: No    Reason for patient still in hospital (select all that apply): Patient trending condition and Pending disposition  Discharge Plan A: Rehab   Discharge Delays: None known at this time        Assessment/Plan:      * Encephalopathy, metabolic  In the setting of DKA and sepsis upon admission.  Admit CT Head without any concerning new findings- no new infarct as discussed with Radiology  Resolved.    Discharge planning issues  PT/OT recommend rehab.  Patient's wife will be going to [facility] and family wish for them to stay together.  COVID isolation complete on 3/9.    Pressure injury of buttock, unstageable  Seen by wound care with triad started    Severe sepsis  Upon admission:  Organ dysfunction indicated by Acute kidney injury, Encephalopathy  and Acute respiratory failure  Source- Unclear. CXR with diffuse interstitial opacities, however no consolidation noted. UA with pyuria, without elevated leuks. Blood cultures pending. Possibly due to sacral wound, although doesn't look grossly infected.     Started on broad spectrum abx at admission with vanc/Zosyn/doxy.    - vancomycin discontinued as no MRSA has isolated.  No infectious source found but leukocytosis persists.  Consider de-escalation further based on continued improvement.     F/u Urine and blood cultures  - Wound care consulted for sacral decubitus wound. Appreciate recs.  -lactic acid overall trend improved    Acute hypoxemic respiratory failure  In setting of COVID infection. Resolved upon step-down from MICU. On RA.   maintain SpO2 88-92% given hx of COPD  -wean oxygen as tolerated    Palliative care status  Per MICU: patient wishes to be DNR/DNI and family agrees. Still want to continue full medical care     COVID-19 virus infection  Viral Pneumonia due to COVID-19  - COVID-19  testing:   -Patient is identified as High risk for severe complications of COVID 19 based on COVID risk score of 8   Initiate standard COVID protocols; COVID-19 testing Collection Date: 8/9/2021 Collection Time:   3:41 PM ,Infection Control notification  and isolation- respiratory, contact and droplet per protocol  COVID vaccinated with booster.   Received 1 dose of sotrovimab infusion 02/18/2022  - Diagnostics: Trend LDH, CRP, Ferritin, D-dimer Q48hrs if stable, more frequently if patient decompensating.     Lymphopenia, hyponatremia, hyperferritinemia, elevated troponin, elevated d-dimer, age, and medical comorbidities are significant predictors of poor clinical outcome     - Management: Per [facility] COVID Treatment Protocol (4/15/20)       - Monitoring:          - Telemetry & Continuous Pulse Oximetry       - Targeted therapy Remdesivir, 200mg IV x1, followed by 100mg IV daily x5 days total,  - Holding dexamethasone PO/IV 6mg daily x10 days in the setting of DKA/HHS.-  -Weaning 02 as tolerated  - Anticoagulation, Patient admitted to critical care up initial presentation and on triple therapy - continue home apixaban dose anticoagulation      - Antibiotics:  - if indications, CXR findings, elevated procal. See protocol for alternatives.   - Discontinue early if low concern for bacterial co-infection          - Initiated on IV vanc, zosyn and doxycycline (unable to use azithromycin due to prolong QTc)     - Nutrition:           - Multivitamin PO daily          - Add Boost supplement          - Vitamin D 1000IU daily if deficient          - Ascorbic acid 500mg PO bid    - Supportive Care:          - acetaminophen 650mg PO Q6hr PRN fever/headache          - loperamide PRN viral diarrhea    Anticipate end isolation on 3/9/2022    Diabetic ketoacidosis with coma associated with type 2 diabetes mellitus  Patient with uncontrolled DM presenting with metabolic encephalopathy in the setting of DKA with coma. Suspect patient  developed DKA in the setting of sepsis/ COVID-19   Glucose 1109, pH 7.17, Co2 15.6 HCO3 <5, AG unable to calculate  DKA protocol completed and DKA resolved in MICU and Pt stepped down on subq insulin.  Hypoglycemia episode upon step-down, detemir adjusted from BID to qhs per home long acting insulin and due to episode of hypoglycemia   Continue aspart TIDWM  Diabetic diet  POCT BGx4  Cotinue to titrate short and long acting insulin needs as indicated to hospital goal 140-180  Endocrinology continues to adjust insulin doses.    Focal seizures  Continue home lancosamide     BRENDAN (acute kidney injury)  Estimated Creatinine Clearance: 94 mL/min (based on SCr of 0.7 mg/dL).  sCr maxed at 3.1, baseline 1.1-1.3. Likely prerenal in the setting of DKA  Improved with treatment of DKA  - Avoid nephrotoxins, renally dose meds    Paroxysmal atrial fibrillation  History of paroxysmal atrial fibrillation on home  Metoprolol XL 50 mg daily, diltiazem  mg daily and amiodarone 200 mg daily.  - Continue po apixaban  - Continue amiodarone  - metoprolol and diltiazem held in MICU  in the setting of sepsis.   - resumed BB and CBB with titration as tolerated per BP          Embolic stroke involving right middle cerebral artery  Hx of CVA in Jan 2022. CT Head with evolving infarcts in right frontal and left cerebellar hemispheres.   No concern for acute stroke this admit per discussion with radiology.   - Continue home antiplatelets, statin and Eliquis    Coronary artery disease involving native coronary artery of native heart with unstable angina pectoris  Hx of CAD s/p placement of 2 LAUREN in RCA in 01/09/2022.   - Continue home ASA, Plavix and statin    Pulmonary nodules  Has stable bilateral  pulmonary masses which could be malignancy per outpatient pulmonology notes. No pathology report as none of the nodules appeared to be safe for biopsy given high risk of PTX is high with many adjacent bulla.      Chronic pulmonary heart  disease  Follows up with Pulmonary clinic in [place]. Last seen in December and was evaluated for pulmonary masses. Deemed to have mild COPD per notes. Not on any maintenance therapy.  - Continue albuterol inhaler     Type 2 diabetes mellitus with hyperglycemia, with long-term current use of insulin  See DKA  Glucoses remain erratic; Endocrine consulted and managing    Hypertension associated with diabetes  resume home antihypertensives as tolerated  Held losartan in setting of BRENDAN  See pAF        Active Hospital Problems    Diagnosis  POA    *Encephalopathy, metabolic [G93.41]  Yes    Discharge planning issues [Z02.9]  Not Applicable    Diabetic acidosis [E11.10]  Yes    Pressure injury of buttock, unstageable [L89.300]  Yes    Palliative care status [Z51.5]  Not Applicable    Acute hypoxemic respiratory failure [J96.01]  Yes    Severe sepsis [A41.9, R65.20]  Yes    COVID-19 virus infection [U07.1]  Yes    Diabetic ketoacidosis with coma associated with type 2 diabetes mellitus [E11.11]  Yes     Chronic    Focal seizures [R56.9]  Yes     Chronic    Paroxysmal atrial fibrillation [I48.0]  Yes     Chronic    BRENDAN (acute kidney injury) [N17.9]  Yes    Embolic stroke involving right middle cerebral artery [I63.411]  Yes     Chronic    Coronary artery disease involving native coronary artery of native heart with unstable angina pectoris [I25.110]  Yes     Chronic    Pulmonary nodules [R91.8]  Yes    Chronic pulmonary heart disease [I27.9]  Yes    Type 2 diabetes mellitus with hyperglycemia, with long-term current use of insulin [E11.65, Z79.4]  Not Applicable     Chronic    Hypertension associated with diabetes [E11.59, I15.2]  Yes     Chronic      Resolved Hospital Problems   No resolved problems to display.       Inpatient Medications Prescribed for Management of Current Problems:     Scheduled Meds:    acetaminophen  650 mg Oral TID    amiodarone  200 mg Oral Daily    apixaban  5 mg Oral BID     ascorbic acid (vitamin C)  500 mg Oral BID    aspirin  81 mg Oral Daily    atorvastatin  40 mg Oral Daily    clopidogreL  75 mg Oral Daily    diclofenac sodium  4 g Topical (Top) QID    gabapentin  200 mg Oral TID    insulin aspart U-100  5 Units Subcutaneous TIDWM    insulin detemir U-100  8 Units Subcutaneous QHS    lacosamide  100 mg Oral Q12H    LIDOcaine  1 patch Transdermal Q24H    metoprolol tartrate  25 mg Oral BID    multivitamin  1 tablet Oral Daily    pantoprazole  40 mg Oral BID     Continuous Infusions:   As Needed: acetaminophen, albuterol **AND** MDI Q4H, calcium carbonate, dextrose 10%, dextrose 10%, glucagon (human recombinant), glucose, glucose, insulin aspart U-100, loperamide, melatonin, ondansetron, oxyCODONE, sodium chloride 0.9%    VTE Risk Mitigation (From admission, onward)         Ordered     apixaban tablet 5 mg  2 times daily         02/19/22 2202     IP VTE HIGH RISK PATIENT  Once         02/19/22 2144     Place sequential compression device  Until discontinued         02/19/22 2144              I have assessed these finding virtually using telemed platform and with assistance of bedside nurse     The attending portion of this evaluation, treatment, and documentation was performed per Tiffany Awad MD via Telemedicine AudioVisual using the secure Vidyo software platform with 2 way audio/video. The provider was located off-site and the patient is located in the hospital. The aforementioned video software was utilized to document the relevant history and physical exam. Secure eCareManager software platform was used instead of RewardLoop for this visit.      Tiffany Awad MD  Department of Hospital Medicine   [facility] - Intensive Care ([place]-16)

## 2022-03-04 NOTE — ASSESSMENT & PLAN NOTE
Estimated Creatinine Clearance: 94 mL/min (based on SCr of 0.7 mg/dL).  sCr maxed at 3.1, baseline 1.1-1.3. Likely prerenal in the setting of DKA  Improved with treatment of DKA  - Avoid nephrotoxins, renally dose meds

## 2022-03-04 NOTE — PLAN OF CARE
Problem: Adult Inpatient Plan of Care  Goal: Plan of Care Review  Outcome: Ongoing, Not Progressing  Goal: Optimal Comfort and Wellbeing  Outcome: Ongoing, Not Progressing     Problem: Impaired Wound Healing  Goal: Optimal Wound Healing  Outcome: Ongoing, Not Progressing     Problem: Gas Exchange Impaired  Goal: Optimal Gas Exchange  Outcome: Ongoing, Not Progressing       AAOx4. 1L 02 in progress via nc; 02 sats wnl. Denies discomfort at present time. Blood glucose monitored for hyperglycemic episodes and asymptomatic. Continues with  medication regimen.

## 2022-03-04 NOTE — PLAN OF CARE
Problem: Adult Inpatient Plan of Care  Goal: Plan of Care Review  Outcome: Ongoing, Not Progressing     Problem: Impaired Wound Healing  Goal: Optimal Wound Healing  Outcome: Ongoing, Not Progressing     Problem: Gas Exchange Impaired  Goal: Optimal Gas Exchange  Outcome: Ongoing, Progressing     AAOx4. No c/o  pain at present time. 02 sats wnl to ra. No acute issues present.

## 2022-03-04 NOTE — PT/OT/SLP PROGRESS
"Occupational Therapy   Treatment    Name: Kamar Muñoz  MRN: [id number]  Admitting Diagnosis:  Encephalopathy, metabolic       Recommendations:     Discharge Recommendations: SNF  Discharge Equipment Recommendations:  other (see comments)  Barriers to discharge:  Decreased caregiver support    Assessment:     Kamar Muñoz is a 76 y.o. male with a medical diagnosis of Encephalopathy, metabolic.  He presents with continued debility making him an excellent candidate for IPR. Performance deficits affecting function are weakness, impaired endurance, impaired self care skills, impaired functional mobilty, gait instability, impaired balance, decreased coordination, decreased safety awareness.     Rehab Prognosis:  Good; patient would benefit from acute skilled OT services to address these deficits and reach maximum level of function.       Plan:     Patient to be seen 4 x/week to address the above listed problems via self-care/home management, therapeutic activities, therapeutic exercises, neuromuscular re-education  · Plan of Care Expires: 03/22/22  · Plan of Care Reviewed with: patient    Subjective     "I need to lay down, I'm too weak."    Pain/Comfort:  · Pain Rating 1: 0/10    Objective:     Communicated with: rn prior to session.  Patient found supine with telemetry, pulse ox (continuous) upon OT entry to room.    General Precautions: Standard, fall   Orthopedic Precautions:N/A   Braces:    Respiratory Status: Room air     Occupational Performance:     Bed Mobility:    · Patient completed Rolling/Turning to Left with  minimum assistance  · Patient completed Scooting/Bridging with minimum assistance  · Patient completed Supine to Sit with moderate assistance  · Patient completed Sit to Supine with moderate assistance     Functional Mobility/Transfers:  · Patient completed Sit <> Stand Transfer with moderate assistance  with  rolling walker   · Functional Mobility: Took 4 side steps Mod A using a " RW.    Activities of Daily Living:  · Declined.    [facility] 6 Click ADL: 17    Treatment & Education:  From EOB, pt completed BUE therex (x 15 reps each) including:  - elbow flexion/extension  - straight arm raises  - hor Abd/Add  - also, wedge toss with OT from sitting position and ability to correct himself back to midline each time he fell left.    **Discussed OT POC.    Patient left supine with all lines intact and call button in reachEducation:      GOALS:   Multidisciplinary Problems     Occupational Therapy Goals        Problem: Occupational Therapy Goal    Goal Priority Disciplines Outcome Interventions   Occupational Therapy Goal     OT, PT/OT Ongoing, Progressing    Description: Goals to be met by: 3/6/22     Patient will increase functional independence with ADLs by performing:    Feeding with Darke. MET 3/3  UE Dressing with Stand-by Assistance.  LE Dressing with Minimal Assistance.  Grooming while standing with Stand-by Assistance.  Toileting from bedside commode with Stand-by Assistance for hygiene and clothing management.   Toilet transfer to bedside commode with Stand-by Assistance.                     Time Tracking:     OT Date of Treatment: 03/04/22  OT Start Time: 1055  OT Stop Time: 1119  OT Total Time (min): 24 min    Billable Minutes:Therapeutic Activity 12  Therapeutic Exercise 12    OT/JUAN: OT          3/4/2022

## 2022-03-04 NOTE — ASSESSMENT & PLAN NOTE
Endocrinology consulted for BG management.   BG goal 140-180    - Levemir Flex Pen 8 units nightly (20% reduction due to FBG below goal).   - Novolog (aspart) insulin 5 Units SQ TIDWM and prn for BG excursions MDC SSI (150/25).   - BG checks AC/HS      ** Please notify Endocrine for any change and/or advance in diet**  ** Please call Endocrine for any BG related issues **    Discharge Planning:   TBD. Please notify endocrinology prior to discharge.

## 2022-03-04 NOTE — ASSESSMENT & PLAN NOTE
PT/OT recommend rehab.  Patient's wife will be going to [facility] and family wish for them to stay together.  COVID isolation complete on 3/9.

## 2022-03-04 NOTE — ASSESSMENT & PLAN NOTE
Patient with uncontrolled DM presenting with metabolic encephalopathy in the setting of DKA with coma. Suspect patient developed DKA in the setting of sepsis/ COVID-19   Glucose 1109, pH 7.17, Co2 15.6 HCO3 <5, AG unable to calculate  DKA protocol completed and DKA resolved in MICU and Pt stepped down on subq insulin.  Hypoglycemia episode upon step-down, detemir adjusted from BID to qhs per home long acting insulin and due to episode of hypoglycemia   Continue aspart TIDWM  Diabetic diet  POCT BGx4  Cotinue to titrate short and long acting insulin needs as indicated to hospital goal 140-180  Endocrinology continues to adjust insulin doses.

## 2022-03-04 NOTE — PROGRESS NOTES
[facility] ([place] [id number])  Endocrinology  Progress Note    Admit Date: 2/19/2022     Reason for Consult: Management of T2DM, Hyperglycemia     Diabetes diagnosis year: 2010    Home Diabetes Medications:  Novolog 9 TIDWM + SSI; Levemir 20 units qd; Metformin 1000 mg BID    How often checking glucose at home? 1-3 x day   BG readings on regimen: 100-150's  Hypoglycemia on the regimen?  No  Missed doses on regimen?  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 History was not obtained from patient due to encephalopathy. Patient's daughter at bedside reports the patient was recently in the ED on 2/17 for a mechanical fall after being discharged from rehab the same day. CTH and cervical spine revealed  evolving late subacute infarct involving the right frontal lobe with questionable petechial hemorrhage. Vascular neurology evaluated the patient and cleared him for discharge from without any new interventions. He was also tested positive for COVID-19 and was discharged yesterday from the ED. He subsequently received one dose of sotrovimab infusion for COVID and was in a normal state of health until this morning when his daughter found him lethargic and barely responsive prompting her to bring him to the ED. She reports the patient had no complaints prior to developing his AMS. Of note, the patient [place] [facility] [place] for AMS concerning for CVA, however he was treated for metabolic encephalopathy 2/2 to DKA/HHS, sepsis and BRENDAN. Upon arrival to the ED, he was placed on 6L NC,  "normotensive and tachycardic. Lactic 11.5, WBC 17.8, Glucose 1109, pH 7.17, Co2 15.6 HCO3 <5, AG unable to calculate. sCr 3.1. CXR with intersitial opacities. He received ~4L of IVF, vancomycin, zosyn, initiated on insulin shifted for hyperkalemia and calcium for cardioprotection. ECG without noticeable ischemic changes. CTH without new changes- discussed findings with radiology. Patient was admitted to the MICU for further management. Endocrine consulted to manage type 2 diabetes and hyperglycemia. Since admission patient has had fluctuations in BG control.             Interval HPI:   Overnight events: No acute events overnight. Patient on the IMTA Unit in room 41505/42569 A. Blood glucose stable. BG at, above, and below goal on current insulin regimen (SSI, prandial, and basal insulin ). Steroid use- None.    Renal function- Normal   Vasopressors-  None       Diet diabetic [facility];  Calorie     Eatin%  Nausea: No  Hypoglycemia and intervention: Yes; likely due to excessive basal insulin dosing. Adjustments made to basal insulin. Patient responded to hypoglycemic tx.   Fever: No  TPN and/or TF: No    BP (!) 133/94 (BP Location: Right arm, Patient Position: Lying)   Pulse 70   Temp 98.2 °F (36.8 °C) (Oral)   Resp 18   Ht 6' 2" (1.88 m)   Wt 76.4 kg (168 lb 6.9 oz)   SpO2 (!) 92%   BMI 21.63 kg/m²     Labs Reviewed and Include    Recent Labs   Lab 22  0431   GLU 38*   CALCIUM 8.1*   ALBUMIN 1.9*   PROT 6.1      K 3.9   CO2 24      BUN 17   CREATININE 0.7   ALKPHOS 102   ALT 11   AST 26   BILITOT 0.5     Lab Results   Component Value Date    WBC 9.72 2022    HGB 11.8 (L) 2022    HCT 36.6 (L) 2022    MCV 86 2022     2022     No results for input(s): TSH, FREET4 in the last 168 hours.  Lab Results   Component Value Date    HGBA1C 11.5 (H) 2022       Nutritional status:   Body mass index is 21.63 kg/m².  Lab Results   Component Value " Date    ALBUMIN 1.9 (L) 03/03/2022    ALBUMIN 1.7 (L) 03/02/2022    ALBUMIN 1.7 (L) 03/01/2022     No results found for: PREALBUMIN    Estimated Creatinine Clearance: 94 mL/min (based on SCr of 0.7 mg/dL).    Accu-Checks  Recent Labs     03/02/22  0745 03/02/22  1119 03/02/22  1526 03/02/22  2102 03/03/22  0545 03/03/22  0627 03/03/22  0655 03/03/22  0815 03/03/22  1150 03/03/22  1651   POCTGLUCOSE 98 304* 285* 105 40* 54* 84 141* 364* 476*       Current Medications and/or Treatments Impacting Glycemic Control  Immunotherapy:    Immunosuppressants       None          Steroids:   Hormones (From admission, onward)                Start     Stop Route Frequency Ordered    02/23/22 1824  melatonin tablet 6 mg         -- Oral Nightly PRN 02/23/22 1724          Pressors:    Autonomic Drugs (From admission, onward)                None          Hyperglycemia/Diabetes Medications:   Antihyperglycemics (From admission, onward)                Start     Stop Route Frequency Ordered    03/03/22 2100  insulin detemir U-100 pen 8 Units         -- SubQ Nightly 03/03/22 1136    03/03/22 1237  insulin aspart U-100 pen 0-5 Units         -- SubQ Before meals & nightly PRN 03/03/22 1137    03/01/22 1130  insulin aspart U-100 pen 5 Units         -- SubQ 3 times daily with meals 03/01/22 1019            ASSESSMENT and PLAN    * Encephalopathy, metabolic    Managed per primary team  Avoid hypoglycemia      Type 2 diabetes mellitus with hyperglycemia, with long-term current use of insulin  Endocrinology consulted for BG management.   BG goal 140-180    - Levemir Flex Pen 8 units nightly (20% reduction due to FBG below goal).   - Novolog (aspart) insulin 5 Units SQ TIDWM and prn for BG excursions MDC SSI (150/25).   - BG checks AC/HS      ** Please notify Endocrine for any change and/or advance in diet**  ** Please call Endocrine for any BG related issues **    Discharge Planning:   TBD. Please notify endocrinology prior to  discharge.        Diabetic ketoacidosis with coma associated with type 2 diabetes mellitus  Now resolved.     Will continue to monitor in the event of BG excursions.       COVID-19 virus infection  Infection may elevate BG readings  Managed per primary team               Cresencio Duke DNP, FNP  Endocrinology  [facility] ([place])

## 2022-03-05 PROBLEM — I95.9 HYPOTENSION: Status: ACTIVE | Noted: 2022-03-05

## 2022-03-05 PROBLEM — J96.01 ACUTE HYPOXEMIC RESPIRATORY FAILURE: Status: RESOLVED | Noted: 2022-02-20 | Resolved: 2022-03-05

## 2022-03-05 LAB
ALBUMIN SERPL BCP-MCNC: 1.8 G/DL (ref 3.5–5.2)
ALBUMIN SERPL BCP-MCNC: 1.9 G/DL (ref 3.5–5.2)
ALBUMIN SERPL BCP-MCNC: 1.9 G/DL (ref 3.5–5.2)
ALP SERPL-CCNC: 102 U/L (ref 55–135)
ALP SERPL-CCNC: 103 U/L (ref 55–135)
ALP SERPL-CCNC: 99 U/L (ref 55–135)
ALT SERPL W/O P-5'-P-CCNC: 10 U/L (ref 10–44)
ALT SERPL W/O P-5'-P-CCNC: 11 U/L (ref 10–44)
ALT SERPL W/O P-5'-P-CCNC: 9 U/L (ref 10–44)
ANION GAP SERPL CALC-SCNC: 10 MMOL/L (ref 8–16)
ANION GAP SERPL CALC-SCNC: 13 MMOL/L (ref 8–16)
ANION GAP SERPL CALC-SCNC: 8 MMOL/L (ref 8–16)
AST SERPL-CCNC: 17 U/L (ref 10–40)
AST SERPL-CCNC: 18 U/L (ref 10–40)
AST SERPL-CCNC: 19 U/L (ref 10–40)
BASOPHILS # BLD AUTO: 0.04 K/UL (ref 0–0.2)
BASOPHILS # BLD AUTO: 0.08 K/UL (ref 0–0.2)
BASOPHILS NFR BLD: 0.4 % (ref 0–1.9)
BASOPHILS NFR BLD: 0.9 % (ref 0–1.9)
BILIRUB SERPL-MCNC: 0.5 MG/DL (ref 0.1–1)
BILIRUB SERPL-MCNC: 0.5 MG/DL (ref 0.1–1)
BILIRUB SERPL-MCNC: 0.6 MG/DL (ref 0.1–1)
BUN SERPL-MCNC: 15 MG/DL (ref 8–23)
BUN SERPL-MCNC: 23 MG/DL (ref 8–23)
BUN SERPL-MCNC: 24 MG/DL (ref 8–23)
CALCIUM SERPL-MCNC: 7.9 MG/DL (ref 8.7–10.5)
CALCIUM SERPL-MCNC: 8 MG/DL (ref 8.7–10.5)
CALCIUM SERPL-MCNC: 8 MG/DL (ref 8.7–10.5)
CHLORIDE SERPL-SCNC: 100 MMOL/L (ref 95–110)
CHLORIDE SERPL-SCNC: 98 MMOL/L (ref 95–110)
CHLORIDE SERPL-SCNC: 99 MMOL/L (ref 95–110)
CO2 SERPL-SCNC: 19 MMOL/L (ref 23–29)
CO2 SERPL-SCNC: 23 MMOL/L (ref 23–29)
CO2 SERPL-SCNC: 26 MMOL/L (ref 23–29)
CREAT SERPL-MCNC: 1 MG/DL (ref 0.5–1.4)
CREAT SERPL-MCNC: 1.2 MG/DL (ref 0.5–1.4)
CREAT SERPL-MCNC: 1.4 MG/DL (ref 0.5–1.4)
D DIMER PPP IA.FEU-MCNC: 0.5 MG/L FEU
DIFFERENTIAL METHOD: ABNORMAL
DIFFERENTIAL METHOD: ABNORMAL
EOSINOPHIL # BLD AUTO: 0 K/UL (ref 0–0.5)
EOSINOPHIL # BLD AUTO: 0.4 K/UL (ref 0–0.5)
EOSINOPHIL NFR BLD: 0.1 % (ref 0–8)
EOSINOPHIL NFR BLD: 4.9 % (ref 0–8)
ERYTHROCYTE [DISTWIDTH] IN BLOOD BY AUTOMATED COUNT: 16.1 % (ref 11.5–14.5)
ERYTHROCYTE [DISTWIDTH] IN BLOOD BY AUTOMATED COUNT: 16.1 % (ref 11.5–14.5)
EST. GFR  (AFRICAN AMERICAN): 55.2 ML/MIN/1.73 M^2
EST. GFR  (AFRICAN AMERICAN): >60 ML/MIN/1.73 M^2
EST. GFR  (AFRICAN AMERICAN): >60 ML/MIN/1.73 M^2
EST. GFR  (NON AFRICAN AMERICAN): 47.8 ML/MIN/1.73 M^2
EST. GFR  (NON AFRICAN AMERICAN): 57.6 ML/MIN/1.73 M^2
EST. GFR  (NON AFRICAN AMERICAN): >60 ML/MIN/1.73 M^2
FERRITIN SERPL-MCNC: 354 NG/ML (ref 20–300)
GLUCOSE SERPL-MCNC: 263 MG/DL (ref 70–110)
GLUCOSE SERPL-MCNC: 310 MG/DL (ref 70–110)
GLUCOSE SERPL-MCNC: 362 MG/DL (ref 70–110)
HCT VFR BLD AUTO: 34.5 % (ref 40–54)
HCT VFR BLD AUTO: 36.1 % (ref 40–54)
HGB BLD-MCNC: 10.9 G/DL (ref 14–18)
HGB BLD-MCNC: 11.4 G/DL (ref 14–18)
IMM GRANULOCYTES # BLD AUTO: 0.03 K/UL (ref 0–0.04)
IMM GRANULOCYTES # BLD AUTO: 0.04 K/UL (ref 0–0.04)
IMM GRANULOCYTES NFR BLD AUTO: 0.3 % (ref 0–0.5)
IMM GRANULOCYTES NFR BLD AUTO: 0.4 % (ref 0–0.5)
LACTATE SERPL-SCNC: 1 MMOL/L (ref 0.5–2.2)
LACTATE SERPL-SCNC: 1.9 MMOL/L (ref 0.5–2.2)
LDH SERPL L TO P-CCNC: 188 U/L (ref 110–260)
LYMPHOCYTES # BLD AUTO: 1 K/UL (ref 1–4.8)
LYMPHOCYTES # BLD AUTO: 1.7 K/UL (ref 1–4.8)
LYMPHOCYTES NFR BLD: 18.3 % (ref 18–48)
LYMPHOCYTES NFR BLD: 9.2 % (ref 18–48)
MAGNESIUM SERPL-MCNC: 1.5 MG/DL (ref 1.6–2.6)
MCH RBC QN AUTO: 28.1 PG (ref 27–31)
MCH RBC QN AUTO: 28.4 PG (ref 27–31)
MCHC RBC AUTO-ENTMCNC: 31.6 G/DL (ref 32–36)
MCHC RBC AUTO-ENTMCNC: 31.6 G/DL (ref 32–36)
MCV RBC AUTO: 89 FL (ref 82–98)
MCV RBC AUTO: 90 FL (ref 82–98)
MONOCYTES # BLD AUTO: 0.5 K/UL (ref 0.3–1)
MONOCYTES # BLD AUTO: 0.6 K/UL (ref 0.3–1)
MONOCYTES NFR BLD: 4.7 % (ref 4–15)
MONOCYTES NFR BLD: 6.6 % (ref 4–15)
NEUTROPHILS # BLD AUTO: 6.2 K/UL (ref 1.8–7.7)
NEUTROPHILS # BLD AUTO: 9.3 K/UL (ref 1.8–7.7)
NEUTROPHILS NFR BLD: 69 % (ref 38–73)
NEUTROPHILS NFR BLD: 85.2 % (ref 38–73)
NRBC BLD-RTO: 0 /100 WBC
NRBC BLD-RTO: 0 /100 WBC
PHOSPHATE SERPL-MCNC: 2.6 MG/DL (ref 2.7–4.5)
PLATELET # BLD AUTO: 237 K/UL (ref 150–450)
PLATELET # BLD AUTO: 253 K/UL (ref 150–450)
PMV BLD AUTO: 9.4 FL (ref 9.2–12.9)
PMV BLD AUTO: 9.6 FL (ref 9.2–12.9)
POCT GLUCOSE: 278 MG/DL (ref 70–110)
POCT GLUCOSE: 377 MG/DL (ref 70–110)
POCT GLUCOSE: 387 MG/DL (ref 70–110)
POCT GLUCOSE: 429 MG/DL (ref 70–110)
POTASSIUM SERPL-SCNC: 4.4 MMOL/L (ref 3.5–5.1)
POTASSIUM SERPL-SCNC: 4.4 MMOL/L (ref 3.5–5.1)
POTASSIUM SERPL-SCNC: 4.8 MMOL/L (ref 3.5–5.1)
PROT SERPL-MCNC: 5.5 G/DL (ref 6–8.4)
PROT SERPL-MCNC: 5.7 G/DL (ref 6–8.4)
PROT SERPL-MCNC: 5.9 G/DL (ref 6–8.4)
RBC # BLD AUTO: 3.84 M/UL (ref 4.6–6.2)
RBC # BLD AUTO: 4.06 M/UL (ref 4.6–6.2)
SODIUM SERPL-SCNC: 131 MMOL/L (ref 136–145)
SODIUM SERPL-SCNC: 132 MMOL/L (ref 136–145)
SODIUM SERPL-SCNC: 133 MMOL/L (ref 136–145)
TROPONIN I SERPL DL<=0.01 NG/ML-MCNC: 0.02 NG/ML (ref 0–0.03)
WBC # BLD AUTO: 10.86 K/UL (ref 3.9–12.7)
WBC # BLD AUTO: 9.03 K/UL (ref 3.9–12.7)

## 2022-03-05 PROCEDURE — 25000003 PHARM REV CODE 250: Mod: HCNC | Performed by: STUDENT IN AN ORGANIZED HEALTH CARE EDUCATION/TRAINING PROGRAM

## 2022-03-05 PROCEDURE — 84484 ASSAY OF TROPONIN QUANT: CPT | Mod: HCNC | Performed by: STUDENT IN AN ORGANIZED HEALTH CARE EDUCATION/TRAINING PROGRAM

## 2022-03-05 PROCEDURE — 84100 ASSAY OF PHOSPHORUS: CPT | Mod: HCNC | Performed by: STUDENT IN AN ORGANIZED HEALTH CARE EDUCATION/TRAINING PROGRAM

## 2022-03-05 PROCEDURE — 36415 COLL VENOUS BLD VENIPUNCTURE: CPT | Mod: HCNC | Performed by: INTERNAL MEDICINE

## 2022-03-05 PROCEDURE — 80053 COMPREHEN METABOLIC PANEL: CPT | Mod: 91,HCNC | Performed by: STUDENT IN AN ORGANIZED HEALTH CARE EDUCATION/TRAINING PROGRAM

## 2022-03-05 PROCEDURE — 85025 COMPLETE CBC W/AUTO DIFF WBC: CPT | Mod: 91,HCNC | Performed by: STUDENT IN AN ORGANIZED HEALTH CARE EDUCATION/TRAINING PROGRAM

## 2022-03-05 PROCEDURE — 99233 PR SUBSEQUENT HOSPITAL CARE,LEVL III: ICD-10-PCS | Mod: HCNC,,, | Performed by: STUDENT IN AN ORGANIZED HEALTH CARE EDUCATION/TRAINING PROGRAM

## 2022-03-05 PROCEDURE — 83735 ASSAY OF MAGNESIUM: CPT | Mod: HCNC | Performed by: STUDENT IN AN ORGANIZED HEALTH CARE EDUCATION/TRAINING PROGRAM

## 2022-03-05 PROCEDURE — 99232 PR SUBSEQUENT HOSPITAL CARE,LEVL II: ICD-10-PCS | Mod: HCNC,,, | Performed by: NURSE PRACTITIONER

## 2022-03-05 PROCEDURE — 36415 COLL VENOUS BLD VENIPUNCTURE: CPT | Mod: HCNC | Performed by: STUDENT IN AN ORGANIZED HEALTH CARE EDUCATION/TRAINING PROGRAM

## 2022-03-05 PROCEDURE — 63600175 PHARM REV CODE 636 W HCPCS: Mod: HCNC | Performed by: INTERNAL MEDICINE

## 2022-03-05 PROCEDURE — 93005 ELECTROCARDIOGRAM TRACING: CPT | Mod: HCNC

## 2022-03-05 PROCEDURE — 63600175 PHARM REV CODE 636 W HCPCS: Mod: HCNC | Performed by: NURSE PRACTITIONER

## 2022-03-05 PROCEDURE — 80053 COMPREHEN METABOLIC PANEL: CPT | Mod: HCNC | Performed by: STUDENT IN AN ORGANIZED HEALTH CARE EDUCATION/TRAINING PROGRAM

## 2022-03-05 PROCEDURE — 83615 LACTATE (LD) (LDH) ENZYME: CPT | Mod: HCNC | Performed by: STUDENT IN AN ORGANIZED HEALTH CARE EDUCATION/TRAINING PROGRAM

## 2022-03-05 PROCEDURE — 12000002 HC ACUTE/MED SURGE SEMI-PRIVATE ROOM: Mod: HCNC

## 2022-03-05 PROCEDURE — 27000207 HC ISOLATION: Mod: HCNC

## 2022-03-05 PROCEDURE — 83605 ASSAY OF LACTIC ACID: CPT | Mod: HCNC | Performed by: INTERNAL MEDICINE

## 2022-03-05 PROCEDURE — 25000003 PHARM REV CODE 250: Mod: HCNC | Performed by: INTERNAL MEDICINE

## 2022-03-05 PROCEDURE — 87040 BLOOD CULTURE FOR BACTERIA: CPT | Mod: HCNC | Performed by: INTERNAL MEDICINE

## 2022-03-05 PROCEDURE — 93010 EKG 12-LEAD: ICD-10-PCS | Mod: HCNC,,, | Performed by: INTERNAL MEDICINE

## 2022-03-05 PROCEDURE — 94761 N-INVAS EAR/PLS OXIMETRY MLT: CPT | Mod: HCNC

## 2022-03-05 PROCEDURE — 83605 ASSAY OF LACTIC ACID: CPT | Mod: 91,HCNC | Performed by: STUDENT IN AN ORGANIZED HEALTH CARE EDUCATION/TRAINING PROGRAM

## 2022-03-05 PROCEDURE — 99233 SBSQ HOSP IP/OBS HIGH 50: CPT | Mod: HCNC,,, | Performed by: STUDENT IN AN ORGANIZED HEALTH CARE EDUCATION/TRAINING PROGRAM

## 2022-03-05 PROCEDURE — 85025 COMPLETE CBC W/AUTO DIFF WBC: CPT | Mod: HCNC | Performed by: STUDENT IN AN ORGANIZED HEALTH CARE EDUCATION/TRAINING PROGRAM

## 2022-03-05 PROCEDURE — 82728 ASSAY OF FERRITIN: CPT | Mod: HCNC | Performed by: STUDENT IN AN ORGANIZED HEALTH CARE EDUCATION/TRAINING PROGRAM

## 2022-03-05 PROCEDURE — 63600175 PHARM REV CODE 636 W HCPCS: Mod: HCNC | Performed by: STUDENT IN AN ORGANIZED HEALTH CARE EDUCATION/TRAINING PROGRAM

## 2022-03-05 PROCEDURE — 93010 ELECTROCARDIOGRAM REPORT: CPT | Mod: HCNC,,, | Performed by: INTERNAL MEDICINE

## 2022-03-05 PROCEDURE — 85379 FIBRIN DEGRADATION QUANT: CPT | Mod: HCNC | Performed by: STUDENT IN AN ORGANIZED HEALTH CARE EDUCATION/TRAINING PROGRAM

## 2022-03-05 PROCEDURE — 99232 SBSQ HOSP IP/OBS MODERATE 35: CPT | Mod: HCNC,,, | Performed by: NURSE PRACTITIONER

## 2022-03-05 RX ORDER — MIDODRINE HYDROCHLORIDE 5 MG/1
5 TABLET ORAL 3 TIMES DAILY
Status: DISCONTINUED | OUTPATIENT
Start: 2022-03-05 | End: 2022-03-06

## 2022-03-05 RX ORDER — INSULIN ASPART 100 [IU]/ML
6 INJECTION, SOLUTION INTRAVENOUS; SUBCUTANEOUS
Status: DISCONTINUED | OUTPATIENT
Start: 2022-03-05 | End: 2022-03-06

## 2022-03-05 RX ORDER — MAGNESIUM SULFATE HEPTAHYDRATE 40 MG/ML
2 INJECTION, SOLUTION INTRAVENOUS ONCE
Status: COMPLETED | OUTPATIENT
Start: 2022-03-05 | End: 2022-03-05

## 2022-03-05 RX ORDER — INSULIN ASPART 100 [IU]/ML
0-5 INJECTION, SOLUTION INTRAVENOUS; SUBCUTANEOUS
Status: DISCONTINUED | OUTPATIENT
Start: 2022-03-05 | End: 2022-03-10 | Stop reason: HOSPADM

## 2022-03-05 RX ADMIN — GABAPENTIN 200 MG: 100 CAPSULE ORAL at 08:03

## 2022-03-05 RX ADMIN — APIXABAN 5 MG: 5 TABLET, FILM COATED ORAL at 09:03

## 2022-03-05 RX ADMIN — INSULIN ASPART 5 UNITS: 100 INJECTION, SOLUTION INTRAVENOUS; SUBCUTANEOUS at 12:03

## 2022-03-05 RX ADMIN — PANTOPRAZOLE SODIUM 40 MG: 40 TABLET, DELAYED RELEASE ORAL at 09:03

## 2022-03-05 RX ADMIN — INSULIN ASPART 6 UNITS: 100 INJECTION, SOLUTION INTRAVENOUS; SUBCUTANEOUS at 04:03

## 2022-03-05 RX ADMIN — OXYCODONE HYDROCHLORIDE AND ACETAMINOPHEN 500 MG: 500 TABLET ORAL at 08:03

## 2022-03-05 RX ADMIN — GABAPENTIN 200 MG: 100 CAPSULE ORAL at 04:03

## 2022-03-05 RX ADMIN — DICLOFENAC SODIUM 4 G: 10 GEL TOPICAL at 02:03

## 2022-03-05 RX ADMIN — INSULIN ASPART 6 UNITS: 100 INJECTION, SOLUTION INTRAVENOUS; SUBCUTANEOUS at 12:03

## 2022-03-05 RX ADMIN — DICLOFENAC SODIUM 4 G: 10 GEL TOPICAL at 08:03

## 2022-03-05 RX ADMIN — MAGNESIUM SULFATE IN WATER 2 G: 40 INJECTION, SOLUTION INTRAVENOUS at 01:03

## 2022-03-05 RX ADMIN — AMIODARONE HYDROCHLORIDE 200 MG: 200 TABLET ORAL at 09:03

## 2022-03-05 RX ADMIN — MIDODRINE HYDROCHLORIDE 5 MG: 5 TABLET ORAL at 08:03

## 2022-03-05 RX ADMIN — GABAPENTIN 200 MG: 100 CAPSULE ORAL at 09:03

## 2022-03-05 RX ADMIN — ACETAMINOPHEN 650 MG: 325 TABLET ORAL at 08:03

## 2022-03-05 RX ADMIN — INSULIN ASPART 5 UNITS: 100 INJECTION, SOLUTION INTRAVENOUS; SUBCUTANEOUS at 09:03

## 2022-03-05 RX ADMIN — ACETAMINOPHEN 650 MG: 325 TABLET ORAL at 04:03

## 2022-03-05 RX ADMIN — VANCOMYCIN HYDROCHLORIDE 1500 MG: 1.5 INJECTION, POWDER, LYOPHILIZED, FOR SOLUTION INTRAVENOUS at 11:03

## 2022-03-05 RX ADMIN — SODIUM CHLORIDE 500 ML: 0.9 INJECTION, SOLUTION INTRAVENOUS at 10:03

## 2022-03-05 RX ADMIN — INSULIN ASPART 5 UNITS: 100 INJECTION, SOLUTION INTRAVENOUS; SUBCUTANEOUS at 04:03

## 2022-03-05 RX ADMIN — LACOSAMIDE 100 MG: 100 TABLET, FILM COATED ORAL at 09:03

## 2022-03-05 RX ADMIN — MIDODRINE HYDROCHLORIDE 5 MG: 5 TABLET ORAL at 02:03

## 2022-03-05 RX ADMIN — SODIUM CHLORIDE, SODIUM LACTATE, POTASSIUM CHLORIDE, AND CALCIUM CHLORIDE 1000 ML: .6; .31; .03; .02 INJECTION, SOLUTION INTRAVENOUS at 04:03

## 2022-03-05 RX ADMIN — CLOPIDOGREL 75 MG: 75 TABLET, FILM COATED ORAL at 09:03

## 2022-03-05 RX ADMIN — LACOSAMIDE 100 MG: 100 TABLET, FILM COATED ORAL at 08:03

## 2022-03-05 RX ADMIN — ONDANSETRON 4 MG: 4 TABLET, ORALLY DISINTEGRATING ORAL at 07:03

## 2022-03-05 RX ADMIN — MULTIPLE VITAMINS W/ MINERALS TAB 1 TABLET: TAB at 09:03

## 2022-03-05 RX ADMIN — DICLOFENAC SODIUM 4 G: 10 GEL TOPICAL at 09:03

## 2022-03-05 RX ADMIN — ATORVASTATIN CALCIUM 40 MG: 20 TABLET, FILM COATED ORAL at 09:03

## 2022-03-05 RX ADMIN — PIPERACILLIN AND TAZOBACTAM 4.5 G: 4; .5 INJECTION, POWDER, LYOPHILIZED, FOR SOLUTION INTRAVENOUS; PARENTERAL at 04:03

## 2022-03-05 RX ADMIN — APIXABAN 5 MG: 5 TABLET, FILM COATED ORAL at 08:03

## 2022-03-05 RX ADMIN — SODIUM CHLORIDE, SODIUM LACTATE, POTASSIUM CHLORIDE, AND CALCIUM CHLORIDE 1000 ML: .6; .31; .03; .02 INJECTION, SOLUTION INTRAVENOUS at 02:03

## 2022-03-05 RX ADMIN — ASPIRIN 81 MG: 81 TABLET, COATED ORAL at 09:03

## 2022-03-05 RX ADMIN — OXYCODONE HYDROCHLORIDE AND ACETAMINOPHEN 500 MG: 500 TABLET ORAL at 09:03

## 2022-03-05 RX ADMIN — PANTOPRAZOLE SODIUM 40 MG: 40 TABLET, DELAYED RELEASE ORAL at 08:03

## 2022-03-05 RX ADMIN — Medication 400 MG: at 09:03

## 2022-03-05 RX ADMIN — Medication 400 MG: at 08:03

## 2022-03-05 RX ADMIN — ACETAMINOPHEN 650 MG: 325 TABLET ORAL at 09:03

## 2022-03-05 RX ADMIN — LIDOCAINE 1 PATCH: 50 PATCH CUTANEOUS at 04:03

## 2022-03-05 NOTE — ASSESSMENT & PLAN NOTE
Has stable bilateral  pulmonary nodules/masses with broad differential per outpatient pulmonology notes. No pathology report as none of the nodules appeared to be safe for biopsy given high risk of PTX is high with many adjacent bulla.

## 2022-03-05 NOTE — ASSESSMENT & PLAN NOTE
Estimated Creatinine Clearance: 73.1 mL/min (based on SCr of 0.9 mg/dL).  sCr maxed at 3.1, baseline 1.1-1.3. Likely prerenal in the setting of DKA  Improved with treatment of DKA  - Avoid nephrotoxins, renally dose meds

## 2022-03-05 NOTE — ASSESSMENT & PLAN NOTE
Viral Pneumonia due to COVID-19  COVID vaccinated with booster.   Received 1 dose of sotrovimab infusion 02/18/2022  - Diagnostics: Trend LDH, CRP, Ferritin, D-dimer Q48hrs  - Monitoring:          - Telemetry & Continuous Pulse Oximetry  - Completed 5 days of remdesivir, had dexamethasone held initially due to DKA. Currently asymptomatic from COVID.   - On room air    - Nutrition:           - Multivitamin PO daily          - Add Boost supplement          - Vitamin D 1000IU daily if deficient          - Ascorbic acid 500mg PO bid    - Supportive Care:          - acetaminophen 650mg PO Q6hr PRN fever/headache          - loperamide PRN viral diarrhea    Anticipate end isolation on 3/9/2022

## 2022-03-05 NOTE — PLAN OF CARE
No acute events throughout night. Pt AAO X 4; able to express needs.  C/o pain . Meds given as ordered  with good relief.  Possible discharge to [facility] today.  Covid and Safety Precautions maintained.  Bed in low position,  call  light in reach.

## 2022-03-05 NOTE — PLAN OF CARE
Notified of hypotension and patient's reports of nausea.  Sepsis workup initiated.  Empiric antibiotics and NS bolus ordered.  Patient will be transferred back to an in-house service    Tiffany Awad MD

## 2022-03-05 NOTE — PROGRESS NOTES
Pharmacokinetic Initial Assessment: IV Vancomycin    Assessment/Plan:    Patient previously on vancomycin at this admission. Will dose based on the previous PK. Scr trending down to baseline   Initiate intravenous vancomycin with loading dose of 1500 mg once followed by a maintenance dose of vancomycin 1250 mg IV every 24 hours  Desired empiric serum trough concentration is 10 to 20 mcg/mL  Draw vancomycin trough level 60 min prior to third dose on 3/7/2022 at approximately 1030  Pharmacy will continue to follow and monitor vancomycin.      Please contact pharmacy at extension [phone] with any questions regarding this assessment.     Thank you for the consult,   Symone Fisher PharmD       Patient brief summary:  Kamar Muñoz is a 76 y.o. male initiated on antimicrobial therapy with IV Vancomycin for treatment of suspected sepsis    Drug Allergies:   Review of patient's allergies indicates:   Allergen Reactions    Iodine      Other reaction(s): swelling  Other reaction(s): Itching  Other reaction(s): Rash       Actual Body Weight:   76.4 kg     Renal Function:   Estimated Creatinine Clearance: 65.8 mL/min (based on SCr of 1 mg/dL).,     Dialysis Method (if applicable):  N/A    CBC (last 72 hours):  Recent Labs   Lab Result Units 03/03/22  0431 03/04/22 0327 03/05/22 0257   WBC K/uL 9.72 8.05 9.03   Hemoglobin g/dL 11.8* 11.8* 11.4*   Hematocrit % 36.6* 35.9* 36.1*   Platelets K/uL 260 227 237   Gran % % 61.1 65.2 69.0   Lymph % % 24.1 20.7 18.3   Mono % % 9.0 8.1 6.6   Eosinophil % % 4.6 4.8 4.9   Basophil % % 0.9 1.0 0.9   Differential Method  Automated Automated Automated       Metabolic Panel (last 72 hours):  Recent Labs   Lab Result Units 03/03/22  0431 03/04/22  0327 03/05/22  0256 03/05/22  0257   Sodium mmol/L 136 139  --  132*   Potassium mmol/L 3.9 4.2  --  4.8   Chloride mmol/L 104 103  --  98   CO2 mmol/L 24 26  --  26   Glucose mg/dL 38* 173*  --  310*   BUN mg/dL 17 16  --  15   Creatinine mg/dL  0.7 0.9  --  1.0   Albumin g/dL 1.9* 1.8*  --  1.9*   Total Bilirubin mg/dL 0.5 0.5  --  0.6   Alkaline Phosphatase U/L 102 106  --  103   AST U/L 26 20  --  19   ALT U/L 11 11  --  11   Magnesium mg/dL 1.6 1.5*  --  1.5*   Phosphorus mg/dL 2.5* 2.8 2.6*  --        Drug levels (last 3 results):  No results for input(s): VANCOMYCINRA, VANCOMYCINPE, VANCOMYCINTR in the last 72 hours.    Microbiologic Results:  Microbiology Results (last 7 days)     Procedure Component Value Units Date/Time    Blood culture [[id number]]     Order Status: Sent Specimen: Blood     Blood culture [[id number]]     Order Status: Sent Specimen: Blood     Clostridium difficile EIA [[id number]] Collected: 02/28/22 1054    Order Status: Completed Specimen: Stool Updated: 03/01/22 0118     C. diff Antigen Negative     C difficile Toxins A+B, EIA Negative     Comment: Testing not recommended for children <24 months old.

## 2022-03-05 NOTE — ASSESSMENT & PLAN NOTE
Endocrinology consulted for BG management.   BG goal 140-180    - Levemir Flex Pen 9 units nightly (20% increase due to BG excursion)   - Novolog (aspart) insulin 6 Units SQ TIDWM and prn for BG excursions MDC SSI (150/25). (20% increase due to prandial BG excursion)   - BG checks AC/HS/0200      ** Please notify Endocrine for any change and/or advance in diet**  ** Please call Endocrine for any BG related issues **    Discharge Planning:   TBD. Please notify endocrinology prior to discharge.

## 2022-03-05 NOTE — NURSING
Patient felt nauseated this morning at shift change.  Zofran given. Now he c/o indigestion and wants Prilosec.  Day nurse notified.   Also, gave   the number to [facility] (where his wife, Mrs. Yu,  was sent yesterday).

## 2022-03-05 NOTE — PROGRESS NOTES
[facility] ([place] [address])  Endocrinology  Progress Note    Admit Date: 2/19/2022     Reason for Consult: Management of T2DM, Hyperglycemia     Diabetes diagnosis year: 2010    Home Diabetes Medications:  Novolog 9 TIDWM + SSI; Levemir 20 units qd; Metformin 1000 mg BID    How often checking glucose at home? 1-3 x day   BG readings on regimen: 100-150's  Hypoglycemia on the regimen?  No  Missed doses on regimen?  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 History was not obtained from patient due to encephalopathy. Patient's daughter at bedside reports the patient was recently in the ED on 2/17 for a mechanical fall after being discharged from rehab the same day. CTH and cervical spine revealed  evolving late subacute infarct involving the right frontal lobe with questionable petechial hemorrhage. Vascular neurology evaluated the patient and cleared him for discharge from without any new interventions. He was also tested positive for COVID-19 and was discharged yesterday from the ED. He subsequently received one dose of sotrovimab infusion for COVID and was in a normal state of health until this morning when his daughter found him lethargic and barely responsive prompting her to bring him to the ED. She reports the patient had no complaints prior to developing his AMS. Of note, the patient was recently [place] [facility] [place]13 for AMS concerning for CVA, however he was treated for metabolic encephalopathy 2/2 to DKA/HHS, sepsis and BRENDAN. Upon arrival to the ED, he was placed on 6L NC,  "normotensive and tachycardic. Lactic 11.5, WBC 17.8, Glucose 1109, pH 7.17, Co2 15.6 HCO3 <5, AG unable to calculate. sCr 3.1. CXR with intersitial opacities. He received ~4L of IVF, vancomycin, zosyn, initiated on insulin shifted for hyperkalemia and calcium for cardioprotection. ECG without noticeable ischemic changes. CTH without new changes- discussed findings with radiology. Patient was admitted to the MICU for further management. Endocrine consulted to manage type 2 diabetes and hyperglycemia. Since admission patient has had fluctuations in BG control.             Interval HPI:   Overnight events:Acute event this morning- hypotension noted and transition to in-house service. Patient on the IMTA unit in room 01541/25492 A. Blood glucose variable. BG at, above and below goal on current insulin regimen (SSI, prandial, and basal insulin ). Patient's BG is extremely difficult to manage; 0200 BG elevated and SSI not administered. Possible sepsis can be contributing to glucose lability.  Steroid use- None.    Renal function- Normal   Vasopressors-  None         Diet diabetic [facility];  Calorie   Eatin%  Nausea: No  Hypoglycemia and intervention: No  Fever: No  TPN and/or TF: No      BP (!) 99/51 (BP Location: Right arm)   Pulse 92   Temp 98.1 °F (36.7 °C) (Oral)   Resp 16   Ht 6' 2" (1.88 m)   Wt 76.4 kg (168 lb 6.9 oz)   SpO2 95%   BMI 21.63 kg/m²     Labs Reviewed and Include    Recent Labs   Lab 22  0257   *   CALCIUM 8.0*   ALBUMIN 1.9*   PROT 5.9*   *   K 4.8   CO2 26   CL 98   BUN 15   CREATININE 1.0   ALKPHOS 103   ALT 11   AST 19   BILITOT 0.6     Lab Results   Component Value Date    WBC 9.03 2022    HGB 11.4 (L) 2022    HCT 36.1 (L) 2022    MCV 89 2022     2022     No results for input(s): TSH, FREET4 in the last 168 hours.  Lab Results   Component Value Date    HGBA1C 11.5 (H) 2022       Nutritional status:   Body mass " index is 21.63 kg/m².  Lab Results   Component Value Date    ALBUMIN 1.9 (L) 03/05/2022    ALBUMIN 1.8 (L) 03/04/2022    ALBUMIN 1.9 (L) 03/03/2022     No results found for: PREALBUMIN    Estimated Creatinine Clearance: 65.8 mL/min (based on SCr of 1 mg/dL).    Accu-Checks  Recent Labs     03/03/22  0655 03/03/22  0815 03/03/22  1150 03/03/22  1651 03/03/22  2026 03/04/22  0737 03/04/22  1215 03/04/22  1712 03/04/22  2055 03/05/22  0937   POCTGLUCOSE 84 141* 364* 476* 256* 221* 222* 131* 137* 429*       Current Medications and/or Treatments Impacting Glycemic Control  Immunotherapy:    Immunosuppressants       None          Steroids:   Hormones (From admission, onward)                Start     Stop Route Frequency Ordered    02/23/22 1824  melatonin tablet 6 mg         -- Oral Nightly PRN 02/23/22 1724          Pressors:    Autonomic Drugs (From admission, onward)                None          Hyperglycemia/Diabetes Medications:   Antihyperglycemics (From admission, onward)                Start     Stop Route Frequency Ordered    03/05/22 2100  insulin detemir U-100 pen 9 Units         -- SubQ Nightly 03/05/22 1035    03/05/22 1145  insulin aspart U-100 pen 0-5 Units         -- SubQ Before meals, nightly and at 0100 PRN 03/05/22 1034    03/05/22 1130  insulin aspart U-100 pen 6 Units         -- SubQ 3 times daily with meals 03/05/22 1035            ASSESSMENT and PLAN    * Encephalopathy, metabolic    Managed per primary team  Avoid hypoglycemia      Type 2 diabetes mellitus with hyperglycemia, with long-term current use of insulin  Endocrinology consulted for BG management.   BG goal 140-180    - Levemir Flex Pen 9 units nightly (20% increase due to BG excursion)   - Novolog (aspart) insulin 6 Units SQ TIDWM and prn for BG excursions MDC SSI (150/25). (20% increase due to prandial BG excursion)   - BG checks AC/HS/0200      ** Please notify Endocrine for any change and/or advance in diet**  ** Please call Endocrine  for any BG related issues **    Discharge Planning:   TBD. Please notify endocrinology prior to discharge.        Diabetic ketoacidosis with coma associated with type 2 diabetes mellitus  Now resolved.     Will continue to monitor in the event of BG excursions.       COVID-19 virus infection  Infection may elevate BG readings  Managed per primary team               Cresencio Duke, DNP, FNP  Endocrinology  [facility] ([place]

## 2022-03-05 NOTE — ASSESSMENT & PLAN NOTE
Follows up with Pulmonary clinic in [place]. Last seen in December and was evaluated for pulmonary nodules. Deemed to have mild COPD per notes. Not on any maintenance therapy.  - Continue albuterol inhaler

## 2022-03-05 NOTE — PROGRESS NOTES
[facility] ([address] Medicine  Progress Note    Patient Name: Kamar Muñoz  MRN: [id number]  Patient Class: IP- Inpatient   Admission Date: 2/19/2022  Length of Stay: 14 days  Attending Physician: Naida Mitchell MD  Primary Care Provider: Basim Guerrero MD        Subjective:     Principal Problem:Hypotension        HPI:  Kamar Muñoz is a 76 year old male with past medical history of CAD s/p 2 LAUREN in RCA (Jan 2022), HTN, HLD, p. A. 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 History was not obtained from patient due to encephalopathy. Patient's daughter at bedside reports the patient was recently in the ED on 2/17 for a mechanical fall after being discharged from rehab the same day. CTH and cervical spine revealed  evolving late subacute infarct involving the right frontal lobe with questionable petechial hemorrhage. Vascular neurology evaluated the patient and cleared him for discharge from without any new interventions. He was also tested positive for COVID-19 and was discharged yesterday from the ED. He subsequently received one dose of sotrovimab infusion for COVID and was in a normal state of health until this morning when his daughter found him lethargic and barely responsive prompting her to bring him to the ED. She reports the patient had no complaints prior to developing his AMS. Of note, the patient was recently admitted to [facility] from 01/25 through 02/13 for AMS concerning for CVA, however he was treated for metabolic  encephalopathy 2/2 to DKA/HHS, sepsis and BRENDAN.      Upon arrival to the ED, he was placed on 6L NC, normotensive and tachycardic. Lactic 11.5, WBC 17.8, Glucose 1109, pH 7.17, Co2 15.6 HCO3 <5, AG unable to calculate. sCr 3.1. CXR with intersitial opacities. He received ~4L of IVF, vancomycin, zosyn, initiated on insulin shifted for hyperkalemia and calcium for cardioprotection. ECG without noticeable ischemic changes. CTH without new changes- discussed findings with radiology. Patient was admitted to the MICU for further management.        Overview/Hospital Course:  ICU Course:  Placed on remdesivir and broad spectrum IV abx in addition to insulin per DKA protocol. In MICU: Weaned supp O2 to room air; Transitioned to subq insulin; Improvement of AMS. BRENDAN improving gradually. Pt had discussion w/ family in MICU and transitioned from full code to DNR/DNI.  Step down to HM initiated 2/21.    Hospital Medicine Course:  Pt w/ episode of CP and hypoglycemia upon stepdown. Ischemic work-up largely unremarkable with trop down trending from admission. Detemir dosing adjusted from BID to qhs. He had additional hypoglycemic episode upon re-uptitration of long-acting insulin from 12 to 15. Dosing decreased to 13u as patient was hyperglycemic to 230s w/12u qhs. Worsening hyperglycemia to 270s- insulin dose modified to 3u TID wm, and detemir 14u qhs.    Episode of abdominal pain and diarrhea 02/25- appeared to be 2/2 antibiotic use. Antibiotics stopped 02/25. Pt w/persistent diarrhea- c diff studies sent- loperamide stopped.     Pt was found to have St 3 sacral pressure ulcer.     Patient reportedly hypotensive on 3/5 and endorsing nausea, septic work up in progress.       Interval History: Overnight patient endorsed persistent nausea since dinner. He denies any actual emesis. This AM, his BP reportedly was lower than his baseline. On examination in person, patient continues to endorse nausea, but denies any dizziness, chest  pain, shortness of breath, skin changes/painful skin irritation, worsening diarrhea, fevers, or chills. He does report less energy.     Review of Systems   Gastrointestinal:  Positive for nausea. Negative for constipation and diarrhea.   Objective:     Vital Signs (Most Recent):  Temp: 98.1 °F (36.7 °C) (03/05/22 0929)  Pulse: 92 (03/05/22 1030)  Resp: 16 (03/05/22 0929)  BP: (!) 99/51 (03/05/22 1030)  SpO2: 95 % (03/05/22 1030)   Vital Signs (24h Range):  Temp:  [97.6 °F (36.4 °C)-98.8 °F (37.1 °C)] 98.1 °F (36.7 °C)  Pulse:  [63-92] 92  Resp:  [16-19] 16  SpO2:  [92 %-96 %] 95 %  BP: ()/(46-74) 99/51     Weight: 76.4 kg (168 lb 6.9 oz)  Body mass index is 21.63 kg/m².    Intake/Output Summary (Last 24 hours) at 3/5/2022 1058  Last data filed at 3/5/2022 0500  Gross per 24 hour   Intake 744 ml   Output 500 ml   Net 244 ml      Physical Exam  Constitutional:       General: He is not in acute distress.     Appearance: He is well-developed. He is ill-appearing.   HENT:      Head: Normocephalic and atraumatic.   Eyes:      Conjunctiva/sclera: Conjunctivae normal.      Pupils: Pupils are equal, round, and reactive to light.   Neck:      Thyroid: No thyromegaly.      Vascular: No JVD.   Cardiovascular:      Rate and Rhythm: Normal rate and regular rhythm.      Heart sounds: Normal heart sounds. No murmur heard.    No friction rub. No gallop.   Pulmonary:      Effort: Pulmonary effort is normal. No respiratory distress.      Breath sounds: Normal breath sounds. No wheezing or rales.   Abdominal:      General: Bowel sounds are normal. There is no distension.      Palpations: Abdomen is soft. There is no mass.      Tenderness: There is no abdominal tenderness. There is no guarding or rebound.      Hernia: No hernia is present.   Musculoskeletal:         General: No deformity. Normal range of motion.      Cervical back: Normal range of motion and neck supple.   Skin:     General: Skin is warm and dry.      Comments:  Sacral wound without any erythema noted today.   Neurological:      Mental Status: He is alert and oriented to person, place, and time.      Cranial Nerves: No cranial nerve deficit.   Psychiatric:         Behavior: Behavior normal.       Significant Labs: All pertinent labs within the past 24 hours have been reviewed.  CBC:   Recent Labs   Lab 03/04/22 0327 03/05/22 0257   WBC 8.05 9.03   HGB 11.8* 11.4*   HCT 35.9* 36.1*    237     CMP:   Recent Labs   Lab 03/04/22 0327 03/05/22 0257    132*   K 4.2 4.8    98   CO2 26 26   * 310*   BUN 16 15   CREATININE 0.9 1.0   CALCIUM 8.1* 8.0*   PROT 6.0 5.9*   ALBUMIN 1.8* 1.9*   BILITOT 0.5 0.6   ALKPHOS 106 103   AST 20 19   ALT 11 11   ANIONGAP 10 8   EGFRNONAA >60.0 >60.0       Significant Imaging: I have reviewed all pertinent imaging results/findings within the past 24 hours.      Assessment/Plan:      * Hypotension  Unclear precipitant. May be due to improper cuff readings as his left arm displays values of 70-90s/40s while his right arm displays 110s/50s consistently. Patient has reported some nausea since last night, but no actual emesis. Denies any diarrhea. Denies any rigors, fevers, or cough.   - Proceeding with sepsis work-up. BCx x2 ordered. UA w/ reflex UCx ordered.  Started on empiric abx.   - Holding metoprolol for the time being in the setting of hypotension.   - Lactate ordered to evaluate for hypoperfusion.  - Do not suspect any new dissection causing discordinant Bps due to lack of symptoms synonymous with dissection.       Discharge planning issues  PT/OT recommend rehab.  Patient's wife will be going to [facility] and family wish for them to stay together.  COVID isolation complete on 3/9.    Pressure injury of buttock, unstageable  Seen by wound care with [facility] started    Severe sepsis  Upon admission:  Organ dysfunction indicated by Acute kidney injury, Encephalopathy  and Acute respiratory failure  Source- Unclear. CXR with  diffuse interstitial opacities, however no consolidation noted. UA with pyuria, without elevated leuks. Blood cultures pending. Possibly due to sacral wound, although doesn't look grossly infected.   Started on broad spectrum abx at admission with vanc/Zosyn/doxy. He completed therapy and was awaiting placement when he had some hypotension reported on 3/5 with workup pending.    Palliative care status  Per MICU: patient wishes to be DNR/DNI and family agrees. Still want to continue full medical care     COVID-19 virus infection  Viral Pneumonia due to COVID-19  COVID vaccinated with booster.   Received 1 dose of sotrovimab infusion 02/18/2022  - Diagnostics: Trend LDH, CRP, Ferritin, D-dimer Q48hrs  - Monitoring:          - Telemetry & Continuous Pulse Oximetry  - Completed 5 days of remdesivir, had dexamethasone held initially due to DKA. Currently asymptomatic from COVID.   - On room air    - Nutrition:           - Multivitamin PO daily          - Add Boost supplement          - Vitamin D 1000IU daily if deficient          - Ascorbic acid 500mg PO bid    - Supportive Care:          - acetaminophen 650mg PO Q6hr PRN fever/headache          - loperamide PRN viral diarrhea    Anticipate end isolation on 3/9/2022    Diabetic ketoacidosis with coma associated with type 2 diabetes mellitus  Patient with uncontrolled DM presenting with metabolic encephalopathy in the setting of DKA with coma. Suspect patient developed DKA in the setting of sepsis/ COVID-19   Glucose 1109, pH 7.17, Co2 15.6 HCO3 <5, AG unable to calculate  DKA protocol completed and DKA resolved in MICU and Pt stepped down on subq insulin.  Hypoglycemia episode upon step-down, detemir adjusted from BID to qhs per home long acting insulin and due to episode of hypoglycemia   Continue aspart TIDWM  Diabetic diet  POCT BGx4  Cotinue to titrate short and long acting insulin needs as indicated to hospital goal 140-180  Endocrinology continues to adjust insulin  doses daily.    Focal seizures  Continue home lancosamide     Encephalopathy, metabolic  In the setting of DKA and sepsis upon admission.  Admit CT Head without any concerning new findings- no new infarct as discussed with Radiology  Resolved.    BRENDAN (acute kidney injury)  Estimated Creatinine Clearance: 65.8 mL/min (based on SCr of 1 mg/dL).  sCr maxed at 3.1, baseline 1.1-1.3. Likely prerenal in the setting of DKA  Improved with treatment of DKA  - Avoid nephrotoxins, renally dose meds    Paroxysmal atrial fibrillation  History of paroxysmal atrial fibrillation on home  Metoprolol XL 50 mg daily, diltiazem  mg daily and amiodarone 200 mg daily.  - Continue po apixaban  - Continue amiodarone  - Holding metoprolol due to hypotension    Embolic stroke involving right middle cerebral artery  Hx of CVA in Jan 2022. CT Head with evolving infarcts in right frontal and left cerebellar hemispheres.   No concern for acute stroke this admit per discussion with radiology.   - Continue home antiplatelets, statin and Eliquis    Coronary artery disease involving native coronary artery of native heart with unstable angina pectoris  Hx of CAD s/p placement of 2 LAUREN in RCA in 01/09/2022.   - Continue home ASA, Plavix and statin  - Chest pain 3/4 after breakfast; EKG nonischemic. PPI.    Pulmonary nodules  Has stable bilateral  pulmonary nodules/masses with broad differential per outpatient pulmonology notes. No pathology report as none of the nodules appeared to be safe for biopsy given high risk of PTX is high with many adjacent bulla.      Chronic pulmonary heart disease  Follows up with Pulmonary clinic in [place]. Last seen in December and was evaluated for pulmonary nodules. Deemed to have mild COPD per notes. Not on any maintenance therapy.  - Continue albuterol inhaler     Type 2 diabetes mellitus with hyperglycemia, with long-term current use of insulin  See DKA  Glucoses remain erratic; Endocrine consulted and  managing  - Levemir 9u qhs  - Aspart 6u ac/hs    Hypertension associated with diabetes  Holding home meds due to normotension/hypotension  See pAF      VTE Risk Mitigation (From admission, onward)         Ordered     apixaban tablet 5 mg  2 times daily         02/19/22 2202     IP VTE HIGH RISK PATIENT  Once         02/19/22 2144     Place sequential compression device  Until discontinued         02/19/22 2144                Discharge Planning   ALLIE: 3/9/2022     Code Status: DNR   Is the patient medically ready for discharge?: No    Reason for patient still in hospital (select all that apply): Patient trending condition  Discharge Plan A: Rehab   Discharge Delays: None known at this time              Naida Mitchell MD  Department of Hospital Medicine   [facility] - Intensive Care ([place])

## 2022-03-05 NOTE — ASSESSMENT & PLAN NOTE
Hx of CAD s/p placement of 2 LAUREN in RCA in 01/09/2022.   - Continue home ASA, Plavix and statin  - Chest pain 3/4 after breakfast; EKG nonischemic. PPI.

## 2022-03-05 NOTE — ASSESSMENT & PLAN NOTE
Upon admission:  Organ dysfunction indicated by Acute kidney injury, Encephalopathy  and Acute respiratory failure  Source- Unclear. CXR with diffuse interstitial opacities, however no consolidation noted. UA with pyuria, without elevated leuks. Blood cultures pending. Possibly due to sacral wound, although doesn't look grossly infected.   Started on broad spectrum abx at admission with vanc/Zosyn/doxy. He completed therapy and was awaiting placement when he had some hypotension reported on 3/5 with workup pending.

## 2022-03-05 NOTE — PROGRESS NOTES
[facility] - Intensive Care ([place] [address])  [facility] Medicine  Telemedicine Progress Note    Patient Name: Kamar Muñoz  MRN: [id number]  Patient Class: IP- Inpatient   Admission Date: 2/19/2022  Length of Stay: 13 days  Attending Physician: Tiffany Awad MD  Primary Care Provider: Basim Guerrero MD      Subjective:     Principal Problem:Encephalopathy, metabolic    HPI:  Kamar Muñoz is a 76 year old male with past medical history of CAD s/p 2 LAUREN in RCA (Jan 2022), HTN, HLD, p. A. 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 History was not obtained from patient due to encephalopathy. Patient's daughter at bedside reports the patient was recently in the ED on 2/17 for a mechanical fall after being discharged from rehab the same day. CTH and cervical spine revealed  evolving late subacute infarct involving the right frontal lobe with questionable petechial hemorrhage. Vascular neurology evaluated the patient and cleared him for discharge from without any new interventions. He was also tested positive for COVID-19 and was discharged yesterday from the ED. He subsequently received one dose of sotrovimab infusion for COVID and was in a normal state of health until this morning when his daughter found him lethargic and barely responsive prompting her to bring him to the ED. She reports the patient had no complaints prior to developing his AMS. Of note, the patient was recently admitted to [facility] from 01/25 through 02/13 for AMS concerning for CVA, however he was  treated for metabolic encephalopathy 2/2 to DKA/HHS, sepsis and BRENDAN.      Upon arrival to the ED, he was placed on 6L NC, normotensive and tachycardic. Lactic 11.5, WBC 17.8, Glucose 1109, pH 7.17, Co2 15.6 HCO3 <5, AG unable to calculate. sCr 3.1. CXR with intersitial opacities. He received ~4L of IVF, vancomycin, zosyn, initiated on insulin shifted for hyperkalemia and calcium for cardioprotection. ECG without noticeable ischemic changes. CTH without new changes- discussed findings with radiology. Patient was admitted to the MICU for further management.        Overview/Hospital Course:  ICU Course:  Placed on remdesivir and broad spectrum IV abx in addition to insulin per DKA protocol. In MICU: Weaned supp O2 to room air; Transitioned to subq insulin; Improvement of AMS. BRENDAN improving gradually. Pt had discussion w/ family in MICU and transitioned from full code to DNR/DNI.  Step down to  initiated 2/21.    Hospital Medicine Course:  Pt w/ episode of CP and hypoglycemia upon stepdown. Ischemic work-up largely unremarkable with trop down trending from admission. Detemir dosing adjusted from BID to qhs. He had additional hypoglycemic episode upon re-uptitration of long-acting insulin from 12 to 15. Dosing decreased to 13u as patient was hyperglycemic to 230s w/12u qhs. Worsening hyperglycemia to 270s- insulin dose modified to 3u TID wm, and detemir 14u qhs.    Episode of abdominal pain and diarrhea 02/25- appeared to be 2/2 antibiotic use. Antibiotics stopped 02/25. Pt w/persistent diarrhea- c diff studies sent- loperamide stopped.     Pt was found to have St 3 sacral pressure ulcer.       Telemedicine  This service was provided by [facility].    Patient was transferred to [facility] Medicine on:  2/28/2022     Chief Complaint   Patient presents with    Vomiting     Vomiting and altered mental status all day. COVID+     The patient location is: [id number]   Admitted 2/19/2022  6:57 PM  Present with  the patient at the time of the telemed/virtual assessment: N/A    Interval History / Events Overnight:   The patient is able to provide adequate history. Additional history was obtained from past medical records. No significant events reported by Nursing. Patient had episode of chest pain at rest after breakfast this AM. Spontaneously resolved.   Patient complains of fatigue. Symptoms have been unchanged since yesterday. Associated symptoms include: malaise. Symptoms are stable.     Lab test(s) reviewed: H&H stable  Other diagnostic test reviewed EKG    Review of Systems   Constitutional:  Negative for fever.   Respiratory:  Positive for cough.    Skin:  Positive for wound.     Objective:     Vital Signs (Most Recent):  Temp: 98.8 °F (37.1 °C) (03/04/22 1217)  Pulse: 64 (03/04/22 1315)  Resp: 18 (03/04/22 1217)  BP: (!) 142/67 (03/04/22 1217)  SpO2: 96 % (03/04/22 1315) Vital Signs (24h Range):  Temp:  [98 °F (36.7 °C)-98.8 °F (37.1 °C)] 98.8 °F (37.1 °C)  Pulse:  [62-80] 64  Resp:  [16-18] 18  SpO2:  [91 %-96 %] 96 %  BP: (126-156)/(66-94) 142/67     Weight: 76.4 kg (168 lb 6.9 oz)  Body mass index is 21.63 kg/m².    Intake/Output Summary (Last 24 hours) at 3/4/2022 1354  Last data filed at 3/4/2022 1000  Gross per 24 hour   Intake 358 ml   Output 500 ml   Net -142 ml        Physical Exam  Constitutional:       General: He is not in acute distress.     Appearance: Normal appearance.   Eyes:      General: Lids are normal. No scleral icterus.        Right eye: No discharge.         Left eye: No discharge.      Conjunctiva/sclera: Conjunctivae normal.   Neck:      Trachea: Phonation normal.   Cardiovascular:      Rate and Rhythm: Normal rate.      Comments: Monitor / Vital signs reviewed at time of visit  Pulmonary:      Effort: Pulmonary effort is normal. No tachypnea, accessory muscle usage or respiratory distress.   Abdominal:      General: There is no distension.   Skin:     Coloration: Skin is not cyanotic.    Neurological:      Mental Status: He is alert. He is not disoriented.   Psychiatric:         Attention and Perception: Attention normal.         Mood and Affect: Affect normal.         Behavior: Behavior is cooperative.       Significant Labs:     Recent Labs   Lab 12/29/21  0810 01/26/22  1512   HGBA1C 12.3* 11.5*       Recent Labs   Lab 03/03/22 2026 03/04/22  0737 03/04/22  1215   POCTGLUCOSE 256* 221* 222*       Recent Labs   Lab 03/02/22 0311 03/03/22  0431 03/04/22  0327   WBC 8.22 9.72 8.05   HGB 11.3* 11.8* 11.8*   HCT 35.5* 36.6* 35.9*    260 227       Recent Labs   Lab 03/02/22 0311 03/03/22  0431 03/04/22  0327   GRAN 62.2  5.1 61.1  5.9 65.2  5.2   LYMPH 20.8  1.7 24.1  2.3 20.7  1.7   MONO 9.5  0.8 9.0  0.9 8.1  0.7   EOS 0.5 0.5 0.4       Recent Labs   Lab 03/02/22 0311 03/03/22  0431 03/04/22  0327    136 139   K 3.7 3.9 4.2    104 103   CO2 24 24 26   BUN 19 17 16   CREATININE 0.8 0.7 0.9   GLU 85 38* 173*   CALCIUM 8.0* 8.1* 8.1*   ALBUMIN 1.7* 1.9* 1.8*   MG 1.6 1.6 1.5*   PHOS 3.1 2.5* 2.8       Recent Labs   Lab 03/02/22 0311 03/03/22  0431 03/04/22  0327   ALKPHOS 90 102 106   ALT 8* 11 11   AST 17 26 20   PROT 5.7* 6.1 6.0   BILITOT 0.4 0.5 0.5       Procalcitonin (ng/mL)   Date Value   02/19/2022 0.93 (H)     Lactate (Lactic Acid) (mmol/L)   Date Value   02/20/2022 4.4 ()   02/20/2022 4.9 (HH)   02/19/2022 11.5 (HH)   01/25/2022 7.0 (HH)   01/25/2022 10.5 (HH)     BNP (pg/mL)   Date Value   02/19/2022 481 (H)   01/25/2022 658 (H)   02/01/2019 60   11/27/2018 33   07/02/2018 87     Sed Rate   Date Value   02/19/2022 92 mm/Hr (H)   06/01/2007 3 mm/hr     D-Dimer (mg/L FEU)   Date Value   03/03/2022 0.50 (H)   03/01/2022 0.41   02/27/2022 0.37   02/25/2022 0.47   02/21/2022 0.50 (H)   02/19/2022 0.84 (H)     Ferritin (ng/mL)   Date Value   03/03/2022 300   03/01/2022 274   02/27/2022 312 (H)   02/25/2022 337 (H)   02/21/2022 481 (H)   02/19/2022 564 (H)     LD  (U/L)   Date Value   03/03/2022 175   03/01/2022 179   02/27/2022 217   02/25/2022 279 (H)   02/21/2022 294 (H)   02/19/2022 318 (H)     Troponin I (ng/mL)   Date Value   02/21/2022 0.470 (H)   02/21/2022 0.654 (H)   02/20/2022 0.704 (H)   02/20/2022 0.390 (H)   02/19/2022 0.037 (H)   01/26/2022 0.820 (H)   01/26/2022 1.042 (H)   01/26/2022 1.494 (H)     CPK (U/L)   Date Value   02/19/2022 95   01/13/2022 271 (H)   10/21/2011 188   01/08/2008 140   06/01/2007 83   05/21/2007 403 (H)   04/30/2007 293 (H)     Results for orders placed or performed in visit on 05/05/14   Vitamin D   Result Value Ref Range    Vit D, 25-Hydroxy 24 (L) 30 - 96 ng/mL     POC Rapid COVID (no units)   Date Value   02/17/2022 Positive (A)   01/25/2022 Negative   01/12/2022 Negative   01/09/2022 Negative     ECG Results              EKG 12-lead (Final result)  Result time 02/20/22 09:32:21      Final result by Interface, Lab In [facility] (02/20/22 09:32:21)                   Narrative:    Test Reason :     Vent. Rate : 126 BPM     Atrial Rate : 120 BPM     P-R Int : 000 ms          QRS Dur : 162 ms      QT Int : 412 ms       P-R-T Axes : 000 059 -31 degrees     QTc Int : 596 ms    Wide QRS tachycardia  Right bundle branch block  T wave abnormality, consider inferior ischemia  Abnormal ECG  When compared with ECG of 19-FEB-2022 19:09,  Wide QRS tachycardia has replaced Junctional rhythm or SVT  Confirmed by Lencho BARROS MD ([id number]) on 2/20/2022 9:32:14 AM    Referred By: [email or web address]   SELF           Confirmed By:Lencho BARROS MD                                     Repeat EKG 12-lead (Final result)  Result time 02/20/22 09:34:32      Final result by Interface, Lab In [facility] (02/20/22 09:34:32)                   Narrative:    Test Reason :     Vent. Rate : 118 BPM     Atrial Rate : 117 BPM     P-R Int : 000 ms          QRS Dur : 118 ms      QT Int : 374 ms       P-R-T Axes : 000 056 -21 degrees     QTc Int : 524 ms    Accelerated Junctional rhythm vs SVT  with artifact  Nonspecific intraventricular conduction delay  ST and T wave abnormality, consider inferior ischemia  Prolonged QT  Abnormal ECG  When compared with ECG of 27-JAN-2022 00:52,  Rhythm changed  Vent. rate has increased BY  50 BPM  Questionable change in QRS duration  Confirmed by Lencho BARROS MD ([id number]) on 2/20/2022 9:34:22 AM    Referred By: GAGAN   SELF           Confirmed By:Lencho BARROS MD                                  X-Ray Chest AP Portable  Narrative: EXAMINATION:  XR CHEST AP PORTABLE    CLINICAL HISTORY:  Sob, cough;    TECHNIQUE:  Single frontal view of the chest was performed.    COMPARISON:  Chest radiograph February 19, 2022    FINDINGS:  AP portable chest radiographic examination is submitted.  When accounting for difference in position and technique the appearance of the cardiomediastinal silhouette is thought stable.    There appears to be mild prominence of the central pulmonary vascular, there is bilateral pattern of pulmonary opacity consistent with interstitial and ground-glass and patchy alveolar infiltrates, this appears superimposed on chronic change.  There is no evidence for large pleural effusion there is minimal blunting at the right costophrenic angle that may relate to a small amount of pleural fluid.  There is no evidence for pneumothorax.  The osseous structures demonstrate chronic change.  Impression: Mild prominence of the central pulmonary vascular, there is appearance of bilateral opacity consistent with interstitial and ground-glass and patchy alveolar infiltrates.    Electronically signed by: Frantz Lopez  Date:    02/25/2022  Time:    22:03      Labs and Imaging within the last 24 hours listed above were reviewed.       Diet: Diet diabetic [facility]; 2000 Calorie  Significant LDAs:   IV Access Type: Peripheral  Urinary Catheter Indication if present: Patient Does Not Have Urinary Catheter  Other Lines/Tubes/Drains:    HIGH RISK CONDITION(S):   Patient has  a condition that poses threat to life and bodily function: Severe Respiratory Distress     Goals of Care:    Previous admission:  2/17/22  Likely prognosis:  Fair  Code Status: DNR  Comfort Only: No  Hospice: No  Goals at discharge: remain at home, with physician follow-up    Discharge Planning   ALLIE: 3/9/2022     Code Status: DNR   Is the patient medically ready for discharge?: No    Reason for patient still in hospital (select all that apply): Patient trending condition and Pending disposition  Discharge Plan A: Rehab   Discharge Delays: None known at this time      Assessment/Plan:      * Encephalopathy, metabolic  In the setting of DKA and sepsis upon admission.  Admit CT Head without any concerning new findings- no new infarct as discussed with Radiology  Resolved.    Discharge planning issues  PT/OT recommend rehab.  Patient's wife will be going to [facility] and family wish for them to stay together.  COVID isolation complete on 3/9.    Pressure injury of buttock, unstageable  Seen by wound care with [facility] started    Severe sepsis  Upon admission:  Organ dysfunction indicated by Acute kidney injury, Encephalopathy  and Acute respiratory failure  Source- Unclear. CXR with diffuse interstitial opacities, however no consolidation noted. UA with pyuria, without elevated leuks. Blood cultures pending. Possibly due to sacral wound, although doesn't look grossly infected.     Started on broad spectrum abx at admission with vanc/Zosyn/doxy.    - vancomycin discontinued as no MRSA has isolated.  No infectious source found but leukocytosis persists.  Consider de-escalation further based on continued improvement.     F/u Urine and blood cultures  - Wound care consulted for sacral decubitus wound. Appreciate recs.  -lactic acid overall trend improved    Acute hypoxemic respiratory failure  In setting of COVID infection. Resolved upon step-down from MICU. On RA.   maintain SpO2 88-92% given hx of COPD  -wean oxygen as  tolerated    Palliative care status  Per MICU: patient wishes to be DNR/DNI and family agrees. Still want to continue full medical care     COVID-19 virus infection  Viral Pneumonia due to COVID-19  - COVID-19 testing:   -Patient is identified as High risk for severe complications of COVID 19 based on COVID risk score of 8   Initiate standard COVID protocols; COVID-19 testing Collection Date: 8/9/2021 Collection Time:   3:41 PM ,Infection Control notification  and isolation- respiratory, contact and droplet per protocol  COVID vaccinated with booster.   Received 1 dose of sotrovimab infusion 02/18/2022  - Diagnostics: Trend LDH, CRP, Ferritin, D-dimer Q48hrs if stable, more frequently if patient decompensating.     Lymphopenia, hyponatremia, hyperferritinemia, elevated troponin, elevated d-dimer, age, and medical comorbidities are significant predictors of poor clinical outcome     - Management: [facility] (4/15/20)       - Monitoring:          - Telemetry & Continuous Pulse Oximetry       - Targeted therapy Remdesivir, 200mg IV x1, followed by 100mg IV daily x5 days total,  - Holding dexamethasone PO/IV 6mg daily x10 days in the setting of DKA/HHS.-  -Weaning 02 as tolerated  - Anticoagulation, Patient admitted to critical care up initial presentation and on triple therapy - continue home apixaban dose anticoagulation      - Antibiotics:  - if indications, CXR findings, elevated procal. See protocol for alternatives.   - Discontinue early if low concern for bacterial co-infection          - Initiated on IV vanc, zosyn and doxycycline (unable to use azithromycin due to prolong QTc)     - Nutrition:           - Multivitamin PO daily          - Add Boost supplement          - Vitamin D 1000IU daily if deficient          - Ascorbic acid 500mg PO bid    - Supportive Care:          - acetaminophen 650mg PO Q6hr PRN fever/headache          - loperamide PRN viral diarrhea    Anticipate end  isolation on 3/9/2022    Diabetic ketoacidosis with coma associated with type 2 diabetes mellitus  Patient with uncontrolled DM presenting with metabolic encephalopathy in the setting of DKA with coma. Suspect patient developed DKA in the setting of sepsis/ COVID-19   Glucose 1109, pH 7.17, Co2 15.6 HCO3 <5, AG unable to calculate  DKA protocol completed and DKA resolved in MICU and Pt stepped down on subq insulin.  Hypoglycemia episode upon step-down, detemir adjusted from BID to qhs per home long acting insulin and due to episode of hypoglycemia   Continue aspart TIDWM  Diabetic diet  POCT BGx4  Cotinue to titrate short and long acting insulin needs as indicated to hospital goal 140-180  Endocrinology continues to adjust insulin doses daily.    Focal seizures  Continue home lancosamide     BRENDAN (acute kidney injury)  Estimated Creatinine Clearance: 73.1 mL/min (based on SCr of 0.9 mg/dL).  sCr maxed at 3.1, baseline 1.1-1.3. Likely prerenal in the setting of DKA  Improved with treatment of DKA  - Avoid nephrotoxins, renally dose meds    Paroxysmal atrial fibrillation  History of paroxysmal atrial fibrillation on home  Metoprolol XL 50 mg daily, diltiazem  mg daily and amiodarone 200 mg daily.  - Continue po apixaban  - Continue amiodarone  - metoprolol and diltiazem held in MICU  in the setting of sepsis.   - resumed BB and CBB with titration as tolerated per BP          Embolic stroke involving right middle cerebral artery  Hx of CVA in Jan 2022. CT Head with evolving infarcts in right frontal and left cerebellar hemispheres.   No concern for acute stroke this admit per discussion with radiology.   - Continue home antiplatelets, statin and Eliquis    Coronary artery disease involving native coronary artery of native heart with unstable angina pectoris  Hx of CAD s/p placement of 2 LAUREN in RCA in 01/09/2022.   - Continue home ASA, Plavix and statin  - Chest pain 3/4 after breakfast; EKG nonischemic.  PPI.    Pulmonary nodules  Has stable bilateral  pulmonary nodules/masses with broad differential per outpatient pulmonology notes. No pathology report as none of the nodules appeared to be safe for biopsy given high risk of PTX is high with many adjacent bulla.      Chronic pulmonary heart disease  Follows up with Pulmonary clinic in [place]. Last seen in December and was evaluated for pulmonary nodules. Deemed to have mild COPD per notes. Not on any maintenance therapy.  - Continue albuterol inhaler     Type 2 diabetes mellitus with hyperglycemia, with long-term current use of insulin  See DKA  Glucoses remain erratic; Endocrine consulted and managing    Hypertension associated with diabetes  resume home antihypertensives as tolerated  Held losartan in setting of BRENDAN  See pAF        Active Hospital Problems    Diagnosis  POA    *Encephalopathy, metabolic [G93.41]  Yes    Discharge planning issues [Z02.9]  Not Applicable    Diabetic acidosis [E11.10]  Yes    Pressure injury of buttock, unstageable [L89.300]  Yes    Palliative care status [Z51.5]  Not Applicable    Acute hypoxemic respiratory failure [J96.01]  Yes    Severe sepsis [A41.9, R65.20]  Yes    COVID-19 virus infection [U07.1]  Yes    Diabetic ketoacidosis with coma associated with type 2 diabetes mellitus [E11.11]  Yes     Chronic    Focal seizures [R56.9]  Yes     Chronic    Paroxysmal atrial fibrillation [I48.0]  Yes     Chronic    BRENDAN (acute kidney injury) [N17.9]  Yes    Embolic stroke involving right middle cerebral artery [I63.411]  Yes     Chronic    Coronary artery disease involving native coronary artery of native heart with unstable angina pectoris [I25.110]  Yes     Chronic    Pulmonary nodules [R91.8]  Yes    Chronic pulmonary heart disease [I27.9]  Yes    Type 2 diabetes mellitus with hyperglycemia, with long-term current use of insulin [E11.65, Z79.4]  Not Applicable     Chronic    Hypertension associated with diabetes  [E11.59, I15.2]  Yes     Chronic      Resolved Hospital Problems   No resolved problems to display.       Inpatient Medications Prescribed for Management of Current Problems:     Scheduled Meds:    acetaminophen  650 mg Oral TID    amiodarone  200 mg Oral Daily    apixaban  5 mg Oral BID    ascorbic acid (vitamin C)  500 mg Oral BID    aspirin  81 mg Oral Daily    atorvastatin  40 mg Oral Daily    clopidogreL  75 mg Oral Daily    diclofenac sodium  4 g Topical (Top) QID    gabapentin  200 mg Oral TID    insulin aspart U-100  5 Units Subcutaneous TIDWM    insulin detemir U-100  8 Units Subcutaneous QHS    lacosamide  100 mg Oral Q12H    LIDOcaine  1 patch Transdermal Q24H    magnesium oxide  400 mg Oral BID    metoprolol tartrate  25 mg Oral BID    multivitamin  1 tablet Oral Daily    pantoprazole  40 mg Oral BID     Continuous Infusions:   As Needed: acetaminophen, albuterol **AND** MDI Q4H, calcium carbonate, dextrose 10%, dextrose 10%, glucagon (human recombinant), glucose, glucose, insulin aspart U-100, loperamide, melatonin, ondansetron, oxyCODONE, sodium chloride 0.9%    VTE Risk Mitigation (From admission, onward)         Ordered     apixaban tablet 5 mg  2 times daily         02/19/22 2202     IP VTE HIGH RISK PATIENT  Once         02/19/22 2144     Place sequential compression device  Until discontinued         02/19/22 2144              I have assessed these finding virtually using telemed platform and with assistance of bedside nurse     The attending portion of this evaluation, treatment, and documentation was performed per Tiffany Awad MD via Telemedicine AudioVisual using the secure Vidyo software platform with 2 way audio/video. The provider was located off-site and the patient is located in the hospital. The aforementioned video software was utilized to document the relevant history and physical exam. Secure eCareManager software platform was used instead of Zocere for this  visit.      Tiffany Awad MD  Department of Hospital Medicine   [facility] - Intensive Care ([place]

## 2022-03-05 NOTE — ASSESSMENT & PLAN NOTE
See DKA  Glucoses remain erratic; Endocrine consulted and managing  - Levemir 9u qhs  - Aspart 6u ac/hs

## 2022-03-05 NOTE — SUBJECTIVE & OBJECTIVE
Interval History: Overnight patient endorsed persistent nausea since dinner. He denies any actual emesis. This AM, his BP reportedly was lower than his baseline. On examination in person, patient continues to endorse nausea, but denies any dizziness, chest pain, shortness of breath, skin changes/painful skin irritation, worsening diarrhea, fevers, or chills. He does report less energy.     Review of Systems   Gastrointestinal:  Positive for nausea. Negative for constipation and diarrhea.   Objective:     Vital Signs (Most Recent):  Temp: 98.1 °F (36.7 °C) (03/05/22 0929)  Pulse: 92 (03/05/22 1030)  Resp: 16 (03/05/22 0929)  BP: (!) 99/51 (03/05/22 1030)  SpO2: 95 % (03/05/22 1030)   Vital Signs (24h Range):  Temp:  [97.6 °F (36.4 °C)-98.8 °F (37.1 °C)] 98.1 °F (36.7 °C)  Pulse:  [63-92] 92  Resp:  [16-19] 16  SpO2:  [92 %-96 %] 95 %  BP: ()/(46-74) 99/51     Weight: 76.4 kg (168 lb 6.9 oz)  Body mass index is 21.63 kg/m².    Intake/Output Summary (Last 24 hours) at 3/5/2022 1058  Last data filed at 3/5/2022 0500  Gross per 24 hour   Intake 744 ml   Output 500 ml   Net 244 ml      Physical Exam  Constitutional:       General: He is not in acute distress.     Appearance: He is well-developed. He is ill-appearing.   HENT:      Head: Normocephalic and atraumatic.   Eyes:      Conjunctiva/sclera: Conjunctivae normal.      Pupils: Pupils are equal, round, and reactive to light.   Neck:      Thyroid: No thyromegaly.      Vascular: No JVD.   Cardiovascular:      Rate and Rhythm: Normal rate and regular rhythm.      Heart sounds: Normal heart sounds. No murmur heard.    No friction rub. No gallop.   Pulmonary:      Effort: Pulmonary effort is normal. No respiratory distress.      Breath sounds: Normal breath sounds. No wheezing or rales.   Abdominal:      General: Bowel sounds are normal. There is no distension.      Palpations: Abdomen is soft. There is no mass.      Tenderness: There is no abdominal tenderness. There  is no guarding or rebound.      Hernia: No hernia is present.   Musculoskeletal:         General: No deformity. Normal range of motion.      Cervical back: Normal range of motion and neck supple.   Skin:     General: Skin is warm and dry.      Comments: Sacral wound without any erythema noted today.   Neurological:      Mental Status: He is alert and oriented to person, place, and time.      Cranial Nerves: No cranial nerve deficit.   Psychiatric:         Behavior: Behavior normal.       Significant Labs: All pertinent labs within the past 24 hours have been reviewed.  CBC:   Recent Labs   Lab 03/04/22 0327 03/05/22  0257   WBC 8.05 9.03   HGB 11.8* 11.4*   HCT 35.9* 36.1*    237     CMP:   Recent Labs   Lab 03/04/22 0327 03/05/22  0257    132*   K 4.2 4.8    98   CO2 26 26   * 310*   BUN 16 15   CREATININE 0.9 1.0   CALCIUM 8.1* 8.0*   PROT 6.0 5.9*   ALBUMIN 1.8* 1.9*   BILITOT 0.5 0.6   ALKPHOS 106 103   AST 20 19   ALT 11 11   ANIONGAP 10 8   EGFRNONAA >60.0 >60.0       Significant Imaging: I have reviewed all pertinent imaging results/findings within the past 24 hours.

## 2022-03-05 NOTE — ASSESSMENT & PLAN NOTE
Patient with uncontrolled DM presenting with metabolic encephalopathy in the setting of DKA with coma. Suspect patient developed DKA in the setting of sepsis/ COVID-19   Glucose 1109, pH 7.17, Co2 15.6 HCO3 <5, AG unable to calculate  DKA protocol completed and DKA resolved in MICU and Pt stepped down on subq insulin.  Hypoglycemia episode upon step-down, detemir adjusted from BID to qhs per home long acting insulin and due to episode of hypoglycemia   Continue aspart TIDWM  Diabetic diet  POCT BGx4  Cotinue to titrate short and long acting insulin needs as indicated to hospital goal 140-180  Endocrinology continues to adjust insulin doses daily.

## 2022-03-05 NOTE — CARE UPDATE
Care Update:     Acute event this morning- hypotension noted and transition to in-house service. Patient on the IMTA unit in room 58687/12882 A. Blood glucose variable. BG at, above and below goal on current insulin regimen (SSI, prandial, and basal insulin ). Patient's BG is extremely difficult to manage; 0200 BG elevated and SSI not administered. Possible sepsis can be contributing to glucose lability.  Steroid use- None.    Renal function- Normal   Vasopressors-  None       Diet diabetic [facility]; 2000 Calorie     POCT Glucose   Date Value Ref Range Status   03/05/2022 429 (H) 70 - 110 mg/dL Final   03/04/2022 137 (H) 70 - 110 mg/dL Final   03/04/2022 131 (H) 70 - 110 mg/dL Final   03/04/2022 222 (H) 70 - 110 mg/dL Final   03/04/2022 221 (H) 70 - 110 mg/dL Final   03/03/2022 256 (H) 70 - 110 mg/dL Final   03/03/2022 476 (HH) 70 - 110 mg/dL Final   03/03/2022 364 (H) 70 - 110 mg/dL Final   03/03/2022 141 (H) 70 - 110 mg/dL Final   03/03/2022 84 70 - 110 mg/dL Final   03/03/2022 54 (L) 70 - 110 mg/dL Final   03/03/2022 40 (LL) 70 - 110 mg/dL Final   03/02/2022 105 70 - 110 mg/dL Final   03/02/2022 285 (H) 70 - 110 mg/dL Final   03/02/2022 304 (H) 70 - 110 mg/dL Final     Lab Results   Component Value Date    HGBA1C 11.5 (H) 01/26/2022       Endocrinology consulted for BG management.   BG goal 140-180    Diabetes Medications             insulin aspart U-100 (NOVOLOG) 100 unit/mL (3 mL) InPn pen Inject 1-10 Units into the skin before meals and at bedtime as needed (Hyperglycemia).    insulin aspart U-100 (NOVOLOG) 100 unit/mL (3 mL) InPn pen Inject 9 Units into the skin 3 (three) times daily.    insulin glargine (LANTUS) 100 unit/mL injection Inject 20 Units into the skin every evening.    metFORMIN (GLUCOPHAGE) 500 MG tablet Take 1 tablet (500 mg total) by mouth 2 (two) times daily with meals.      Hospital Medications   BG checks AC/HS/0200            glucagon (human recombinant) injection 1 mg 1 mg,  Intramuscular, As needed (PRN), Turn patient on their side, give IM, and NOTIFY MD IMMEDIATELY.<BR><BR>Feed the patient as soon as patient awakens and is able to swallow.    glucose chewable tablet 16 g 16 g, Oral, As needed (PRN), (16 grams = #  four 4gm glucose tablets)    glucose chewable tablet 24 g 24 g, Oral, As needed (PRN), (24 grams = # six 4gm glucose tablets)    insulin aspart U-100 pen 0-5 Units 0-5 Units, Subcutaneous, Before meals &amp; nightly PRN, **LOW CORRECTION DOSE**<BR>Blood Glucose<BR>mg/dL                  Pre-meal                2200<BR>151-200                0 unit                      0 unit<BR>201-250                2 units                    1 unit<BR>251-300                3 units                    1 unit<BR>301-350                4 units                    2 units<BR>&gt;350                     5 units                    3 units<BR>Administer subcutaneously if needed at times designated by monitoring schedule. <BR>DO NOT HOLD correction dose insulin for patients who are  NPO.<BR>&quot;HIGH ALERT MEDICATION&quot; - Administer with meals or TF/TPN.    insulin aspart U-100 pen 5 Units 5 Units, Subcutaneous, 3 times daily with meals, Administer subcutaneously with meals. HOLD prandial (mealtime) insulin if patient is NPO, unable to eat, or if Blood Glucose less than 100 mg/dL.<BR><BR>If patient ate prior to BG check, administer scheduled Novolog only (do not cover with correction dose at this time).<BR>&quot;HIGH ALERT MEDICATION&quot; - Administer with meals or TF/TPN.    insulin detemir U-100 pen 8 Units 8 Units, Subcutaneous, Nightly, DO NOT HOLD basal insulin when patient is NPO.<BR>If  Blood Glucose is less than 100 mg/dL at BEDTIME, give 16 grams (four glucose tablets) X 1 dose. Recheck BG in 30 minutes and call MD for insulin dose adjustments prior to administering insulin detemir (Levemir).<BR>&quot;HIGH ALERT MEDICATION&quot;            ** Please notify Endocrine for any change  and/or advance in diet**  ** Please call Endocrine for any BG related issues **    Discharge Planning:   TBD. Please notify endocrinology prior to discharge.      Cresencio Duke DNP, FNP-C  Department of Endocrinology  Inpatient Glycemic Management

## 2022-03-05 NOTE — SUBJECTIVE & OBJECTIVE
"Interval HPI:   Overnight events:Acute event this morning- hypotension noted and transition to in-house service. Patient on the IMTA unit in room 37368/93068 A. Blood glucose variable. BG at, above and below goal on current insulin regimen (SSI, prandial, and basal insulin ). Patient's BG is extremely difficult to manage; 0200 BG elevated and SSI not administered. Possible sepsis can be contributing to glucose lability.  Steroid use- None.    Renal function- Normal   Vasopressors-  None         Diet diabetic [facility];  Calorie   Eatin%  Nausea: No  Hypoglycemia and intervention: No  Fever: No  TPN and/or TF: No      BP (!) 99/51 (BP Location: Right arm)   Pulse 92   Temp 98.1 °F (36.7 °C) (Oral)   Resp 16   Ht 6' 2" (1.88 m)   Wt 76.4 kg (168 lb 6.9 oz)   SpO2 95%   BMI 21.63 kg/m²     Labs Reviewed and Include    Recent Labs   Lab 22  0257   *   CALCIUM 8.0*   ALBUMIN 1.9*   PROT 5.9*   *   K 4.8   CO2 26   CL 98   BUN 15   CREATININE 1.0   ALKPHOS 103   ALT 11   AST 19   BILITOT 0.6     Lab Results   Component Value Date    WBC 9.03 2022    HGB 11.4 (L) 2022    HCT 36.1 (L) 2022    MCV 89 2022     2022     No results for input(s): TSH, FREET4 in the last 168 hours.  Lab Results   Component Value Date    HGBA1C 11.5 (H) 2022       Nutritional status:   Body mass index is 21.63 kg/m².  Lab Results   Component Value Date    ALBUMIN 1.9 (L) 2022    ALBUMIN 1.8 (L) 2022    ALBUMIN 1.9 (L) 2022     No results found for: PREALBUMIN    Estimated Creatinine Clearance: 65.8 mL/min (based on SCr of 1 mg/dL).    Accu-Checks  Recent Labs     22  0655 22  0815 22  1150 22  1651 22  20222  0737 22  1215 22  1712 22  0937   POCTGLUCOSE 84 141* 364* 476* 256* 221* 222* 131* 137* 429*       Current Medications and/or Treatments Impacting Glycemic " Control  Immunotherapy:    Immunosuppressants       None          Steroids:   Hormones (From admission, onward)                Start     Stop Route Frequency Ordered    02/23/22 1824  melatonin tablet 6 mg         -- Oral Nightly PRN 02/23/22 1724          Pressors:    Autonomic Drugs (From admission, onward)                None          Hyperglycemia/Diabetes Medications:   Antihyperglycemics (From admission, onward)                Start     Stop Route Frequency Ordered    03/05/22 2100  insulin detemir U-100 pen 9 Units         -- SubQ Nightly 03/05/22 1035    03/05/22 1145  insulin aspart U-100 pen 0-5 Units         -- SubQ Before meals, nightly and at 0100 PRN 03/05/22 1034    03/05/22 1130  insulin aspart U-100 pen 6 Units         -- SubQ 3 times daily with meals 03/05/22 1035

## 2022-03-05 NOTE — ASSESSMENT & PLAN NOTE
Unclear precipitant. May be due to improper cuff readings as his left arm displays values of 70-90s/40s while his right arm displays 110s/50s consistently. Patient has reported some nausea since last night, but no actual emesis. Denies any diarrhea. Denies any rigors, fevers, or cough.   - Proceeding with sepsis work-up. BCx x2 ordered. UA w/ reflex UCx ordered.  Started on empiric abx.   - Holding metoprolol for the time being in the setting of hypotension.   - Lactate ordered to evaluate for hypoperfusion.  - Do not suspect any new dissection causing discordinant Bps due to lack of symptoms synonymous with dissection.

## 2022-03-05 NOTE — ASSESSMENT & PLAN NOTE
History of paroxysmal atrial fibrillation on home  Metoprolol XL 50 mg daily, diltiazem  mg daily and amiodarone 200 mg daily.  - Continue po apixaban  - Continue amiodarone  - Holding metoprolol due to hypotension

## 2022-03-06 LAB
ALBUMIN SERPL BCP-MCNC: 1.9 G/DL (ref 3.5–5.2)
ALP SERPL-CCNC: 99 U/L (ref 55–135)
ALT SERPL W/O P-5'-P-CCNC: 10 U/L (ref 10–44)
ANION GAP SERPL CALC-SCNC: 9 MMOL/L (ref 8–16)
AST SERPL-CCNC: 19 U/L (ref 10–40)
BASOPHILS # BLD AUTO: 0.09 K/UL (ref 0–0.2)
BASOPHILS NFR BLD: 0.9 % (ref 0–1.9)
BILIRUB SERPL-MCNC: 0.5 MG/DL (ref 0.1–1)
BUN SERPL-MCNC: 20 MG/DL (ref 8–23)
CALCIUM SERPL-MCNC: 7.9 MG/DL (ref 8.7–10.5)
CHLORIDE SERPL-SCNC: 103 MMOL/L (ref 95–110)
CO2 SERPL-SCNC: 24 MMOL/L (ref 23–29)
CREAT SERPL-MCNC: 0.9 MG/DL (ref 0.5–1.4)
DIFFERENTIAL METHOD: ABNORMAL
EOSINOPHIL # BLD AUTO: 0.3 K/UL (ref 0–0.5)
EOSINOPHIL NFR BLD: 2.9 % (ref 0–8)
ERYTHROCYTE [DISTWIDTH] IN BLOOD BY AUTOMATED COUNT: 16.2 % (ref 11.5–14.5)
EST. GFR  (AFRICAN AMERICAN): >60 ML/MIN/1.73 M^2
EST. GFR  (NON AFRICAN AMERICAN): >60 ML/MIN/1.73 M^2
GLUCOSE SERPL-MCNC: 102 MG/DL (ref 70–110)
HCT VFR BLD AUTO: 33.8 % (ref 40–54)
HGB BLD-MCNC: 10.9 G/DL (ref 14–18)
IMM GRANULOCYTES # BLD AUTO: 0.04 K/UL (ref 0–0.04)
IMM GRANULOCYTES NFR BLD AUTO: 0.4 % (ref 0–0.5)
LYMPHOCYTES # BLD AUTO: 1.5 K/UL (ref 1–4.8)
LYMPHOCYTES NFR BLD: 14.8 % (ref 18–48)
MAGNESIUM SERPL-MCNC: 1.8 MG/DL (ref 1.6–2.6)
MCH RBC QN AUTO: 28.4 PG (ref 27–31)
MCHC RBC AUTO-ENTMCNC: 32.2 G/DL (ref 32–36)
MCV RBC AUTO: 88 FL (ref 82–98)
MONOCYTES # BLD AUTO: 0.7 K/UL (ref 0.3–1)
MONOCYTES NFR BLD: 6.5 % (ref 4–15)
NEUTROPHILS # BLD AUTO: 7.6 K/UL (ref 1.8–7.7)
NEUTROPHILS NFR BLD: 74.5 % (ref 38–73)
NRBC BLD-RTO: 0 /100 WBC
PHOSPHATE SERPL-MCNC: 2.4 MG/DL (ref 2.7–4.5)
PLATELET # BLD AUTO: 254 K/UL (ref 150–450)
PMV BLD AUTO: 9.5 FL (ref 9.2–12.9)
POCT GLUCOSE: 200 MG/DL (ref 70–110)
POCT GLUCOSE: 299 MG/DL (ref 70–110)
POCT GLUCOSE: 373 MG/DL (ref 70–110)
POCT GLUCOSE: 54 MG/DL (ref 70–110)
POCT GLUCOSE: 92 MG/DL (ref 70–110)
POTASSIUM SERPL-SCNC: 4 MMOL/L (ref 3.5–5.1)
PROT SERPL-MCNC: 5.7 G/DL (ref 6–8.4)
RBC # BLD AUTO: 3.84 M/UL (ref 4.6–6.2)
SODIUM SERPL-SCNC: 136 MMOL/L (ref 136–145)
WBC # BLD AUTO: 10.23 K/UL (ref 3.9–12.7)

## 2022-03-06 PROCEDURE — 25000003 PHARM REV CODE 250: Mod: HCNC | Performed by: INTERNAL MEDICINE

## 2022-03-06 PROCEDURE — 27000207 HC ISOLATION: Mod: HCNC

## 2022-03-06 PROCEDURE — 97530 THERAPEUTIC ACTIVITIES: CPT | Mod: HCNC

## 2022-03-06 PROCEDURE — 94761 N-INVAS EAR/PLS OXIMETRY MLT: CPT | Mod: HCNC

## 2022-03-06 PROCEDURE — 99233 PR SUBSEQUENT HOSPITAL CARE,LEVL III: ICD-10-PCS | Mod: HCNC,,, | Performed by: STUDENT IN AN ORGANIZED HEALTH CARE EDUCATION/TRAINING PROGRAM

## 2022-03-06 PROCEDURE — 93010 ELECTROCARDIOGRAM REPORT: CPT | Mod: HCNC,,, | Performed by: INTERNAL MEDICINE

## 2022-03-06 PROCEDURE — 84100 ASSAY OF PHOSPHORUS: CPT | Mod: HCNC | Performed by: STUDENT IN AN ORGANIZED HEALTH CARE EDUCATION/TRAINING PROGRAM

## 2022-03-06 PROCEDURE — 99232 PR SUBSEQUENT HOSPITAL CARE,LEVL II: ICD-10-PCS | Mod: HCNC,,, | Performed by: NURSE PRACTITIONER

## 2022-03-06 PROCEDURE — 85025 COMPLETE CBC W/AUTO DIFF WBC: CPT | Mod: HCNC | Performed by: STUDENT IN AN ORGANIZED HEALTH CARE EDUCATION/TRAINING PROGRAM

## 2022-03-06 PROCEDURE — 25000003 PHARM REV CODE 250: Mod: HCNC | Performed by: STUDENT IN AN ORGANIZED HEALTH CARE EDUCATION/TRAINING PROGRAM

## 2022-03-06 PROCEDURE — 97535 SELF CARE MNGMENT TRAINING: CPT | Mod: HCNC

## 2022-03-06 PROCEDURE — 93010 EKG 12-LEAD: ICD-10-PCS | Mod: HCNC,,, | Performed by: INTERNAL MEDICINE

## 2022-03-06 PROCEDURE — 83735 ASSAY OF MAGNESIUM: CPT | Mod: HCNC | Performed by: STUDENT IN AN ORGANIZED HEALTH CARE EDUCATION/TRAINING PROGRAM

## 2022-03-06 PROCEDURE — 80053 COMPREHEN METABOLIC PANEL: CPT | Mod: HCNC | Performed by: STUDENT IN AN ORGANIZED HEALTH CARE EDUCATION/TRAINING PROGRAM

## 2022-03-06 PROCEDURE — 63600175 PHARM REV CODE 636 W HCPCS: Mod: HCNC | Performed by: INTERNAL MEDICINE

## 2022-03-06 PROCEDURE — 99232 SBSQ HOSP IP/OBS MODERATE 35: CPT | Mod: HCNC,,, | Performed by: NURSE PRACTITIONER

## 2022-03-06 PROCEDURE — 99233 SBSQ HOSP IP/OBS HIGH 50: CPT | Mod: HCNC,,, | Performed by: STUDENT IN AN ORGANIZED HEALTH CARE EDUCATION/TRAINING PROGRAM

## 2022-03-06 PROCEDURE — 12000002 HC ACUTE/MED SURGE SEMI-PRIVATE ROOM: Mod: HCNC

## 2022-03-06 PROCEDURE — 93005 ELECTROCARDIOGRAM TRACING: CPT | Mod: HCNC

## 2022-03-06 RX ORDER — INSULIN ASPART 100 [IU]/ML
8 INJECTION, SOLUTION INTRAVENOUS; SUBCUTANEOUS
Status: DISCONTINUED | OUTPATIENT
Start: 2022-03-06 | End: 2022-03-06

## 2022-03-06 RX ORDER — SUCRALFATE 1 G/1
1 TABLET ORAL
Status: DISCONTINUED | OUTPATIENT
Start: 2022-03-06 | End: 2022-03-10 | Stop reason: HOSPADM

## 2022-03-06 RX ORDER — INSULIN ASPART 100 [IU]/ML
4-8 INJECTION, SOLUTION INTRAVENOUS; SUBCUTANEOUS
Status: DISCONTINUED | OUTPATIENT
Start: 2022-03-06 | End: 2022-03-08

## 2022-03-06 RX ORDER — CALCIUM CARBONATE 200(500)MG
500 TABLET,CHEWABLE ORAL 3 TIMES DAILY PRN
Status: DISCONTINUED | OUTPATIENT
Start: 2022-03-06 | End: 2022-03-06

## 2022-03-06 RX ADMIN — DICLOFENAC SODIUM 4 G: 10 GEL TOPICAL at 01:03

## 2022-03-06 RX ADMIN — AMIODARONE HYDROCHLORIDE 200 MG: 200 TABLET ORAL at 08:03

## 2022-03-06 RX ADMIN — VANCOMYCIN HYDROCHLORIDE 1250 MG: 1.25 INJECTION, POWDER, LYOPHILIZED, FOR SOLUTION INTRAVENOUS at 01:03

## 2022-03-06 RX ADMIN — SUCRALFATE 1 G: 1 TABLET ORAL at 04:03

## 2022-03-06 RX ADMIN — GABAPENTIN 200 MG: 100 CAPSULE ORAL at 08:03

## 2022-03-06 RX ADMIN — MIDODRINE HYDROCHLORIDE 5 MG: 5 TABLET ORAL at 08:03

## 2022-03-06 RX ADMIN — ATORVASTATIN CALCIUM 40 MG: 20 TABLET, FILM COATED ORAL at 08:03

## 2022-03-06 RX ADMIN — ACETAMINOPHEN 650 MG: 325 TABLET ORAL at 09:03

## 2022-03-06 RX ADMIN — DICLOFENAC SODIUM 4 G: 10 GEL TOPICAL at 09:03

## 2022-03-06 RX ADMIN — ASPIRIN 81 MG: 81 TABLET, COATED ORAL at 08:03

## 2022-03-06 RX ADMIN — PIPERACILLIN AND TAZOBACTAM 4.5 G: 4; .5 INJECTION, POWDER, LYOPHILIZED, FOR SOLUTION INTRAVENOUS; PARENTERAL at 04:03

## 2022-03-06 RX ADMIN — DICLOFENAC SODIUM 4 G: 10 GEL TOPICAL at 08:03

## 2022-03-06 RX ADMIN — LIDOCAINE 1 PATCH: 50 PATCH CUTANEOUS at 04:03

## 2022-03-06 RX ADMIN — MULTIPLE VITAMINS W/ MINERALS TAB 1 TABLET: TAB at 08:03

## 2022-03-06 RX ADMIN — GABAPENTIN 200 MG: 100 CAPSULE ORAL at 04:03

## 2022-03-06 RX ADMIN — PANTOPRAZOLE SODIUM 40 MG: 40 TABLET, DELAYED RELEASE ORAL at 08:03

## 2022-03-06 RX ADMIN — PIPERACILLIN AND TAZOBACTAM 4.5 G: 4; .5 INJECTION, POWDER, LYOPHILIZED, FOR SOLUTION INTRAVENOUS; PARENTERAL at 08:03

## 2022-03-06 RX ADMIN — GABAPENTIN 200 MG: 100 CAPSULE ORAL at 09:03

## 2022-03-06 RX ADMIN — LACOSAMIDE 100 MG: 100 TABLET, FILM COATED ORAL at 09:03

## 2022-03-06 RX ADMIN — CLOPIDOGREL 75 MG: 75 TABLET, FILM COATED ORAL at 08:03

## 2022-03-06 RX ADMIN — APIXABAN 5 MG: 5 TABLET, FILM COATED ORAL at 08:03

## 2022-03-06 RX ADMIN — INSULIN ASPART 5 UNITS: 100 INJECTION, SOLUTION INTRAVENOUS; SUBCUTANEOUS at 06:03

## 2022-03-06 RX ADMIN — LACOSAMIDE 100 MG: 100 TABLET, FILM COATED ORAL at 08:03

## 2022-03-06 RX ADMIN — PIPERACILLIN AND TAZOBACTAM 4.5 G: 4; .5 INJECTION, POWDER, LYOPHILIZED, FOR SOLUTION INTRAVENOUS; PARENTERAL at 01:03

## 2022-03-06 RX ADMIN — Medication 400 MG: at 08:03

## 2022-03-06 RX ADMIN — INSULIN ASPART 3 UNITS: 100 INJECTION, SOLUTION INTRAVENOUS; SUBCUTANEOUS at 09:03

## 2022-03-06 RX ADMIN — SUCRALFATE 1 G: 1 TABLET ORAL at 09:03

## 2022-03-06 RX ADMIN — ACETAMINOPHEN 650 MG: 325 TABLET ORAL at 04:03

## 2022-03-06 RX ADMIN — OXYCODONE HYDROCHLORIDE AND ACETAMINOPHEN 500 MG: 500 TABLET ORAL at 09:03

## 2022-03-06 RX ADMIN — INSULIN ASPART 4 UNITS: 100 INJECTION, SOLUTION INTRAVENOUS; SUBCUTANEOUS at 01:03

## 2022-03-06 RX ADMIN — OXYCODONE HYDROCHLORIDE AND ACETAMINOPHEN 500 MG: 500 TABLET ORAL at 08:03

## 2022-03-06 RX ADMIN — ACETAMINOPHEN 650 MG: 325 TABLET ORAL at 08:03

## 2022-03-06 RX ADMIN — APIXABAN 5 MG: 5 TABLET, FILM COATED ORAL at 09:03

## 2022-03-06 RX ADMIN — PANTOPRAZOLE SODIUM 40 MG: 40 TABLET, DELAYED RELEASE ORAL at 09:03

## 2022-03-06 RX ADMIN — DICLOFENAC SODIUM 4 G: 10 GEL TOPICAL at 06:03

## 2022-03-06 RX ADMIN — Medication 400 MG: at 09:03

## 2022-03-06 RX ADMIN — INSULIN ASPART 8 UNITS: 100 INJECTION, SOLUTION INTRAVENOUS; SUBCUTANEOUS at 06:03

## 2022-03-06 NOTE — CARE UPDATE
Patients heart rate continued to drop while sleeping, secured chat Dr. Pascal with instructions to page the night team. Was given a verbal order to order EKG stat if he continued to sustained bradycardia while sleeping. I ordered EKG STAT awaiting results. The patient is now awake and denies any symptoms, is able to answer appropriately without deviation. Will educate day nurse to contact on call physician with EKG results and possible additional orders.

## 2022-03-06 NOTE — PROGRESS NOTES
[facility] - [facility] ([address] Medicine  Progress Note    Patient Name: Kamar Muñoz  MRN: [id number]  Patient Class: IP- Inpatient   Admission Date: 2/19/2022  Length of Stay: 15 days  Attending Physician: Naida Mitchell MD  Primary Care Provider: Basim Guerrero MD        Subjective:     Principal Problem:Hypotension        HPI:  Kamar Muñoz is a 76 year old male with past medical history of CAD s/p 2 LAUREN in RCA (Jan 2022), HTN, HLD, p. A. 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 History was not obtained from patient due to encephalopathy. Patient's daughter at bedside reports the patient was recently in the ED on 2/17 for a mechanical fall after being discharged from rehab the same day. CTH and cervical spine revealed  evolving late subacute infarct involving the right frontal lobe with questionable petechial hemorrhage. Vascular neurology evaluated the patient and cleared him for discharge from without any new interventions. He was also tested positive for COVID-19 and was discharged yesterday from the ED. He subsequently received one dose of sotrovimab infusion for COVID and was in a normal state of health until this morning when his daughter found him lethargic and barely responsive prompting her to bring him to the ED. She reports the patient had no complaints prior to developing his AMS. Of note, the patient was recently admitted to [facility] from 01/25 through 02/13 for AMS concerning for CVA, however he was treated for metabolic  encephalopathy 2/2 to DKA/HHS, sepsis and BRENDAN.      Upon arrival to the ED, he was placed on 6L NC, normotensive and tachycardic. Lactic 11.5, WBC 17.8, Glucose 1109, pH 7.17, Co2 15.6 HCO3 <5, AG unable to calculate. sCr 3.1. CXR with intersitial opacities. He received ~4L of IVF, vancomycin, zosyn, initiated on insulin shifted for hyperkalemia and calcium for cardioprotection. ECG without noticeable ischemic changes. CTH without new changes- discussed findings with radiology. Patient was admitted to the MICU for further management.        Overview/Hospital Course:  ICU Course:  Placed on remdesivir and broad spectrum IV abx in addition to insulin per DKA protocol. In MICU: Weaned supp O2 to room air; Transitioned to subq insulin; Improvement of AMS. BRENDAN improving gradually. Pt had discussion w/ family in MICU and transitioned from full code to DNR/DNI.  Step down to HM initiated 2/21.    Hospital Medicine Course:  Pt w/ episode of CP and hypoglycemia upon stepdown. Ischemic work-up largely unremarkable with trop down trending from admission. Detemir dosing adjusted from BID to qhs. He had additional hypoglycemic episode upon re-uptitration of long-acting insulin from 12 to 15. Dosing decreased to 13u as patient was hyperglycemic to 230s w/12u qhs. Worsening hyperglycemia to 270s- insulin dose modified to 3u TID wm, and detemir 14u qhs.    Episode of abdominal pain and diarrhea 02/25- appeared to be 2/2 antibiotic use. Antibiotics stopped 02/25. Pt w/persistent diarrhea- c diff studies sent- loperamide stopped.     Pt was found to have St 3 sacral pressure ulcer.     Patient reportedly hypotensive on 3/5 and endorsing nausea, septic work up in progress. Patient's BP responded after 2.5L of fluid and brief initiation of midodrine. Bcx still pending from 3/5.       Interval History: Overnight patient had some bradycardia but was asymptomatic. EKG showed improvement in rate to 70s. Patient this AM still reports nausea  with some heartburn, reports a burning sensation in his esophagus. He otherwise has no complaints other than pain along his sacral wound.     Review of Systems   Constitutional:  Negative for chills and fever.   HENT:  Negative for congestion and sore throat.    Eyes:  Negative for photophobia and visual disturbance.   Respiratory:  Negative for cough and shortness of breath.    Cardiovascular:  Negative for chest pain and palpitations.   Gastrointestinal:  Positive for nausea. Negative for abdominal pain, blood in stool, constipation, diarrhea and vomiting.   Endocrine: Negative for cold intolerance and heat intolerance.   Genitourinary:  Negative for dysuria and hematuria.   Musculoskeletal:  Positive for back pain (near sacral wound). Negative for arthralgias and myalgias.   Skin:  Negative for rash and wound.   Allergic/Immunologic: Negative for environmental allergies and food allergies.   Neurological:  Negative for seizures and numbness.   Hematological:  Negative for adenopathy. Does not bruise/bleed easily.   Psychiatric/Behavioral:  Negative for hallucinations and suicidal ideas.    Objective:     Vital Signs (Most Recent):  Temp: 97.9 °F (36.6 °C) (03/06/22 0853)  Pulse: 70 (03/06/22 0853)  Resp: 16 (03/06/22 0853)  BP: (!) 142/69 (03/06/22 0853)  SpO2: (!) 91 % (03/06/22 0853)   Vital Signs (24h Range):  Temp:  [97.8 °F (36.6 °C)-98.7 °F (37.1 °C)] 97.9 °F (36.6 °C)  Pulse:  [36-91] 70  Resp:  [16-19] 16  SpO2:  [90 %-96 %] 91 %  BP: ()/(40-69) 142/69     Weight: 76.4 kg (168 lb 6.9 oz)  Body mass index is 21.63 kg/m².  No intake or output data in the 24 hours ending 03/06/22 1110     Physical Exam  Constitutional:       General: He is not in acute distress.     Appearance: He is well-developed. He is ill-appearing.   HENT:      Head: Normocephalic and atraumatic.   Eyes:      Conjunctiva/sclera: Conjunctivae normal.      Pupils: Pupils are equal, round, and reactive to light.   Neck:      Thyroid:  No thyromegaly.      Vascular: No JVD.   Cardiovascular:      Rate and Rhythm: Normal rate and regular rhythm.      Heart sounds: Normal heart sounds. No murmur heard.    No friction rub. No gallop.   Pulmonary:      Effort: Pulmonary effort is normal. No respiratory distress.      Breath sounds: Normal breath sounds. No wheezing or rales.   Abdominal:      General: Bowel sounds are normal. There is no distension.      Palpations: Abdomen is soft. There is no mass.      Tenderness: There is no abdominal tenderness. There is no guarding or rebound.      Hernia: No hernia is present.   Musculoskeletal:         General: No deformity. Normal range of motion.      Cervical back: Normal range of motion and neck supple.   Skin:     General: Skin is warm and dry.      Comments: Sacral wound without any worsening erythema noted, unable to express any purulence.   Neurological:      Mental Status: He is alert and oriented to person, place, and time.      Cranial Nerves: No cranial nerve deficit.   Psychiatric:         Behavior: Behavior normal.       Significant Labs: All pertinent labs within the past 24 hours have been reviewed.  CBC:   Recent Labs   Lab 03/05/22  0257 03/05/22  1407 03/06/22  0345   WBC 9.03 10.86 10.23   HGB 11.4* 10.9* 10.9*   HCT 36.1* 34.5* 33.8*    253 254       CMP:   Recent Labs   Lab 03/05/22  1407 03/05/22  1939 03/06/22  0345   * 133* 136   K 4.4 4.4 4.0   CL 99 100 103   CO2 19* 23 24   * 263* 102   BUN 23 24* 20   CREATININE 1.4 1.2 0.9   CALCIUM 7.9* 8.0* 7.9*   PROT 5.7* 5.5* 5.7*   ALBUMIN 1.9* 1.8* 1.9*   BILITOT 0.5 0.5 0.5   ALKPHOS 99 102 99   AST 18 17 19   ALT 10 9* 10   ANIONGAP 13 10 9   EGFRNONAA 47.8* 57.6* >60.0         Significant Imaging: I have reviewed all pertinent imaging results/findings within the past 24 hours.      Assessment/Plan:      * Hypotension  Patient's hypotension on 3/5 resolved after fluids. He was also briefly on midodrine whenever his  readings remained low with a MAP of <65. Lactate normal, patient did have worsening BUN/Cr that improved after fluids.   - Discontinue midodrine  - BCx x2 ordered, NGTD. UA w/ reflex UCx ordered, yet to be collected. CXR slightly improved from before when patient admitted for COVID. Started on empiric abx.   - Holding metoprolol for the time being in the setting of hypotension.       Discharge planning issues  PT/OT recommend rehab.  Patient's wife will be going to [facility] and family wish for them to stay together.  COVID isolation complete on 3/9.    Pressure injury of buttock, unstageable  Seen by wound care with [facility] started    Severe sepsis  Upon admission:  Organ dysfunction indicated by Acute kidney injury, Encephalopathy  and Acute respiratory failure  Source- Unclear. CXR with diffuse interstitial opacities, however no consolidation noted. UA with pyuria, without elevated leuks. Blood cultures pending. Possibly due to sacral wound, although doesn't look grossly infected.   Started on broad spectrum abx at admission with vanc/Zosyn/doxy. He completed therapy and was awaiting placement when he had some hypotension reported on 3/5 with workup pending.    Palliative care status  Per MICU: patient wishes to be DNR/DNI and family agrees. Still want to continue full medical care     COVID-19 virus infection  Viral Pneumonia due to COVID-19  COVID vaccinated with booster.   Received 1 dose of sotrovimab infusion 02/18/2022  - Diagnostics: Trend LDH, CRP, Ferritin, D-dimer Q48hrs  - Monitoring:          - Telemetry & Continuous Pulse Oximetry  - Completed 5 days of remdesivir, had dexamethasone held initially due to DKA. Currently asymptomatic from COVID.   - On room air    - Nutrition:           - Multivitamin PO daily          - Add Boost supplement          - Vitamin D 1000IU daily if deficient          - Ascorbic acid 500mg PO bid    - Supportive Care:          - acetaminophen 650mg PO Q6hr PRN fever/headache           - loperamide PRN viral diarrhea    Anticipate end isolation on 3/9/2022    Diabetic ketoacidosis with coma associated with type 2 diabetes mellitus  RESOLVED  Patient with uncontrolled DM presenting with metabolic encephalopathy in the setting of DKA with coma. Suspect patient developed DKA in the setting of sepsis/ COVID-19   Glucose 1109, pH 7.17, Co2 15.6 HCO3 <5, AG unable to calculate  DKA protocol completed and DKA resolved in MICU and Pt stepped down on subq insulin.  Hypoglycemia episode upon step-down, detemir adjusted from BID to qhs per home long acting insulin and due to episode of hypoglycemia   Continue aspart TIDWM  Diabetic diet  POCT BGx4  Cotinue to titrate short and long acting insulin needs as indicated to hospital goal 140-180  Endocrinology continues to adjust insulin doses daily.    Focal seizures  Continue home lancosamide     Encephalopathy, metabolic  In the setting of DKA and sepsis upon admission.  Admit CT Head without any concerning new findings- no new infarct as discussed with Radiology  Resolved.    BRENDAN (acute kidney injury)  Estimated Creatinine Clearance: 73.1 mL/min (based on SCr of 0.9 mg/dL).  sCr maxed at 3.1, baseline 1.1-1.3. Likely prerenal in the setting of DKA  Improved with treatment of DKA  - Avoid nephrotoxins, renally dose meds    Paroxysmal atrial fibrillation  History of paroxysmal atrial fibrillation on home  Metoprolol XL 50 mg daily, diltiazem  mg daily and amiodarone 200 mg daily.  - Continue po apixaban  - Continue amiodarone  - Holding metoprolol due to hypotension    Embolic stroke involving right middle cerebral artery  Hx of CVA in Jan 2022. CT Head with evolving infarcts in right frontal and left cerebellar hemispheres.   No concern for acute stroke this admit per discussion with radiology.   - Continue home antiplatelets, statin and Eliquis    Coronary artery disease involving native coronary artery of native heart with unstable angina  pectoris  Hx of CAD s/p placement of 2 LAUREN in RCA in 01/09/2022.   - Continue home ASA, Plavix and statin  - Chest pain 3/4 after breakfast; EKG nonischemic. PPI, tums, carafate added    Pulmonary nodules  Has stable bilateral  pulmonary nodules/masses with broad differential per outpatient pulmonology notes. No pathology report as none of the nodules appeared to be safe for biopsy given high risk of PTX is high with many adjacent bulla.      Chronic pulmonary heart disease  Follows up with Pulmonary clinic in [place]. Last seen in December and was evaluated for pulmonary nodules. Deemed to have mild COPD per notes. Not on any maintenance therapy.  - Continue albuterol inhaler     Type 2 diabetes mellitus with hyperglycemia, with long-term current use of insulin  See DKA  Glucoses remain erratic; Endocrine consulted and managing  - Levemir 8u qhs  - Aspart 4-8u ac/hs pending BG values    Hypertension associated with diabetes  Holding home meds due to normotension/hypotension  See pAF      VTE Risk Mitigation (From admission, onward)         Ordered     apixaban tablet 5 mg  2 times daily         02/19/22 2202     IP VTE HIGH RISK PATIENT  Once         02/19/22 2144     Place sequential compression device  Until discontinued         02/19/22 2144                Discharge Planning   ALLIE: 3/9/2022     Code Status: DNR   Is the patient medically ready for discharge?: No    Reason for patient still in hospital (select all that apply): Patient trending condition  Discharge Plan A: Rehab   Discharge Delays: None known at this time              Naida Mitchell MD  Department of Hospital Medicine   [facility] - Intensive Care ([place])

## 2022-03-06 NOTE — ASSESSMENT & PLAN NOTE
RESOLVED  Patient with uncontrolled DM presenting with metabolic encephalopathy in the setting of DKA with coma. Suspect patient developed DKA in the setting of sepsis/ COVID-19   Glucose 1109, pH 7.17, Co2 15.6 HCO3 <5, AG unable to calculate  DKA protocol completed and DKA resolved in MICU and Pt stepped down on subq insulin.  Hypoglycemia episode upon step-down, detemir adjusted from BID to qhs per home long acting insulin and due to episode of hypoglycemia   Continue aspart TIDWM  Diabetic diet  POCT BGx4  Cotinue to titrate short and long acting insulin needs as indicated to hospital goal 140-180  Endocrinology continues to adjust insulin doses daily.

## 2022-03-06 NOTE — ASSESSMENT & PLAN NOTE
Endocrinology consulted for BG management.   BG goal 140-180    - Levemir Flex Pen 8 units nightly (20% reducation due to FBG below goal)   - Novolog (aspart) insulin 8 Units SQ TIDWM and prn for BG excursions MDC SSI (150/25). (20% increase due to prandial BG excursion)   - BG checks AC/HS/0200      ** Please notify Endocrine for any change and/or advance in diet**  ** Please call Endocrine for any BG related issues **    Discharge Planning:   TBD. Please notify endocrinology prior to discharge.

## 2022-03-06 NOTE — ASSESSMENT & PLAN NOTE
See DKA  Glucoses remain erratic; Endocrine consulted and managing  - Levemir 8u qhs  - Aspart 4-8u ac/hs pending BG values

## 2022-03-06 NOTE — ASSESSMENT & PLAN NOTE
Patient's hypotension on 3/5 resolved after fluids. He was also briefly on midodrine whenever his readings remained low with a MAP of <65. Lactate normal, patient did have worsening BUN/Cr that improved after fluids.   - Discontinue midodrine  - BCx x2 ordered, NGTD. UA w/ reflex UCx ordered, yet to be collected. CXR slightly improved from before when patient admitted for COVID. Started on empiric abx.   - Holding metoprolol for the time being in the setting of hypotension.

## 2022-03-06 NOTE — PT/OT/SLP PROGRESS
Occupational Therapy   Treatment    Name: Kamar Muñoz  MRN: [id number]  Admitting Diagnosis:  Hypotension       Recommendations:     Discharge Recommendations: nursing facility, skilled  Discharge Equipment Recommendations:  other (see comments)  Barriers to discharge:  None    Assessment:     Kamar Muñoz is a 76 y.o. male with a medical diagnosis of Hypotension. Pt progressing and able to do a toilet t/f from bedside chair with Min A (stand/pivot). Performance deficits affecting function are weakness, impaired endurance, impaired self care skills, impaired functional mobilty, gait instability, impaired balance, decreased coordination, decreased safety awareness.     Rehab Prognosis:  Good; patient would benefit from acute skilled OT services to address these deficits and reach maximum level of function.       Plan:     Patient to be seen 4 x/week to address the above listed problems via self-care/home management, therapeutic activities, therapeutic exercises  · Plan of Care Expires: 03/22/22  · Plan of Care Reviewed with: patient    Subjective     Pain/Comfort:  · Pain Rating 1: 0/10    Objective:     Communicated with: rn prior to session.  Patient found supine with telemetry, pulse ox (continuous), peripheral IV, oxygen upon OT entry to room.    General Precautions: Standard, fall   Orthopedic Precautions:N/A   Braces:    Respiratory Status: Nasal cannula, flow 1 L/min     Occupational Performance:     Bed Mobility:    · Patient completed Rolling/Turning to Left with  stand by assistance  · Patient completed Scooting/Bridging with contact guard assistance  · Patient completed Supine to Sit with moderate assistance     Functional Mobility/Transfers:  · Patient completed Sit <> Stand Transfer with moderate assistance  with  no assistive device   · Patient completed Bed <> Chair Transfer using Stand Pivot technique with minimum assistance with no assistive device  · Patient completed Toilet Transfer Stand  Pivot technique with minimum assistance with  no AD    Activities of Daily Living:  · Feeding:  independence  · Lower Body Dressing: total assistance   · Toileting: moderate assistance assistance with diaper management but did pericare w/o assistance.    [employer] [facility] 6 Click ADL: 18    Treatment & Education:  Discussed OT POC and progress.  Advised to call for assistance as needed.    Patient left up in chair with all lines intact, call button in reach and nurse notifiedEducation:      GOALS:   Multidisciplinary Problems     Occupational Therapy Goals        Problem: Occupational Therapy Goal    Goal Priority Disciplines Outcome Interventions   Occupational Therapy Goal     OT, PT/OT Ongoing, Progressing    Description: Goals to be met by: 3/6/22     Patient will increase functional independence with ADLs by performing:    Feeding with [place]. MET 3/3  UE Dressing with Stand-by Assistance.  LE Dressing with Minimal Assistance.  Grooming while standing with Stand-by Assistance.  Toileting from bedside commode with Stand-by Assistance for hygiene and clothing management. MET 3/6 from toilet  Toilet transfer to bedside commode with Stand-by Assistance.                     Time Tracking:     OT Date of Treatment: 03/06/22  OT Start Time: 1217  OT Stop Time: 1255  OT Total Time (min): 38 min    Billable Minutes:Self Care/Home Management 12  Therapeutic Activity 26    OT/JUAN: OT          3/6/2022

## 2022-03-06 NOTE — SUBJECTIVE & OBJECTIVE
Interval History: Overnight patient had some bradycardia but was asymptomatic. EKG showed improvement in rate to 70s. Patient this AM still reports nausea with some heartburn, reports a burning sensation in his esophagus. He otherwise has no complaints other than pain along his sacral wound.     Review of Systems   Constitutional:  Negative for chills and fever.   HENT:  Negative for congestion and sore throat.    Eyes:  Negative for photophobia and visual disturbance.   Respiratory:  Negative for cough and shortness of breath.    Cardiovascular:  Negative for chest pain and palpitations.   Gastrointestinal:  Positive for nausea. Negative for abdominal pain, blood in stool, constipation, diarrhea and vomiting.   Endocrine: Negative for cold intolerance and heat intolerance.   Genitourinary:  Negative for dysuria and hematuria.   Musculoskeletal:  Positive for back pain (near sacral wound). Negative for arthralgias and myalgias.   Skin:  Negative for rash and wound.   Allergic/Immunologic: Negative for environmental allergies and food allergies.   Neurological:  Negative for seizures and numbness.   Hematological:  Negative for adenopathy. Does not bruise/bleed easily.   Psychiatric/Behavioral:  Negative for hallucinations and suicidal ideas.    Objective:     Vital Signs (Most Recent):  Temp: 97.9 °F (36.6 °C) (03/06/22 0853)  Pulse: 70 (03/06/22 0853)  Resp: 16 (03/06/22 0853)  BP: (!) 142/69 (03/06/22 0853)  SpO2: (!) 91 % (03/06/22 0853)   Vital Signs (24h Range):  Temp:  [97.8 °F (36.6 °C)-98.7 °F (37.1 °C)] 97.9 °F (36.6 °C)  Pulse:  [36-91] 70  Resp:  [16-19] 16  SpO2:  [90 %-96 %] 91 %  BP: ()/(40-69) 142/69     Weight: 76.4 kg (168 lb 6.9 oz)  Body mass index is 21.63 kg/m².  No intake or output data in the 24 hours ending 03/06/22 1110     Physical Exam  Constitutional:       General: He is not in acute distress.     Appearance: He is well-developed. He is ill-appearing.   HENT:      Head:  Normocephalic and atraumatic.   Eyes:      Conjunctiva/sclera: Conjunctivae normal.      Pupils: Pupils are equal, round, and reactive to light.   Neck:      Thyroid: No thyromegaly.      Vascular: No JVD.   Cardiovascular:      Rate and Rhythm: Normal rate and regular rhythm.      Heart sounds: Normal heart sounds. No murmur heard.    No friction rub. No gallop.   Pulmonary:      Effort: Pulmonary effort is normal. No respiratory distress.      Breath sounds: Normal breath sounds. No wheezing or rales.   Abdominal:      General: Bowel sounds are normal. There is no distension.      Palpations: Abdomen is soft. There is no mass.      Tenderness: There is no abdominal tenderness. There is no guarding or rebound.      Hernia: No hernia is present.   Musculoskeletal:         General: No deformity. Normal range of motion.      Cervical back: Normal range of motion and neck supple.   Skin:     General: Skin is warm and dry.      Comments: Sacral wound without any worsening erythema noted, unable to express any purulence.   Neurological:      Mental Status: He is alert and oriented to person, place, and time.      Cranial Nerves: No cranial nerve deficit.   Psychiatric:         Behavior: Behavior normal.       Significant Labs: All pertinent labs within the past 24 hours have been reviewed.  CBC:   Recent Labs   Lab 03/05/22  0257 03/05/22  1407 03/06/22  0345   WBC 9.03 10.86 10.23   HGB 11.4* 10.9* 10.9*   HCT 36.1* 34.5* 33.8*    253 254       CMP:   Recent Labs   Lab 03/05/22  1407 03/05/22  1939 03/06/22  0345   * 133* 136   K 4.4 4.4 4.0   CL 99 100 103   CO2 19* 23 24   * 263* 102   BUN 23 24* 20   CREATININE 1.4 1.2 0.9   CALCIUM 7.9* 8.0* 7.9*   PROT 5.7* 5.5* 5.7*   ALBUMIN 1.9* 1.8* 1.9*   BILITOT 0.5 0.5 0.5   ALKPHOS 99 102 99   AST 18 17 19   ALT 10 9* 10   ANIONGAP 13 10 9   EGFRNONAA 47.8* 57.6* >60.0         Significant Imaging: I have reviewed all pertinent imaging results/findings  within the past 24 hours.

## 2022-03-06 NOTE — PROGRESS NOTES
[facility] ([place] [address])  Endocrinology  Progress Note    Admit Date: 2/19/2022     Reason for Consult: Management of T2DM, Hyperglycemia     Diabetes diagnosis year: 2010    Home Diabetes Medications:  Novolog 9 TIDWM + SSI; Levemir 20 units qd; Metformin 1000 mg BID    How often checking glucose at home? 1-3 x day   BG readings on regimen: 100-150's  Hypoglycemia on the regimen?  No  Missed doses on regimen?  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 History was not obtained from patient due to encephalopathy. Patient's daughter at bedside reports the patient was recently in the ED on 2/17 for a mechanical fall after being discharged from rehab the same day. CTH and cervical spine revealed  evolving late subacute infarct involving the right frontal lobe with questionable petechial hemorrhage. Vascular neurology evaluated the patient and cleared him for discharge from without any new interventions. He was also tested positive for COVID-19 and was discharged yesterday from the ED. He subsequently received one dose of sotrovimab infusion for COVID and was in a normal state of health until this morning when his daughter found him lethargic and barely responsive prompting her to bring him to the ED. She reports the patient had no complaints prior to developing his AMS. Of note, the patient was recently admitted to [facility] from 01/25 through 02/13 for AMS concerning for CVA, however he was treated for metabolic encephalopathy 2/2 to DKA/HHS, sepsis and BRENDAN. Upon arrival to the ED, he was placed on 6L NC,  "normotensive and tachycardic. Lactic 11.5, WBC 17.8, Glucose 1109, pH 7.17, Co2 15.6 HCO3 <5, AG unable to calculate. sCr 3.1. CXR with intersitial opacities. He received ~4L of IVF, vancomycin, zosyn, initiated on insulin shifted for hyperkalemia and calcium for cardioprotection. ECG without noticeable ischemic changes. CTH without new changes- discussed findings with radiology. Patient was admitted to the MICU for further management. Endocrine consulted to manage type 2 diabetes and hyperglycemia. Since admission patient has had fluctuations in BG control.             Interval HPI:   Overnight events: Patient on the IMTA unit in room 31895/70065 A. Blood glucose variable. BG at, above and below goal on current insulin regimen (SSI, prandial, and basal insulin ). Patient's BG is extremely difficult to manage. Possible sepsis can be contributing to glucose lability.  Steroid use- None.    Renal function- Normal   Vasopressors-  None         Diet diabetic [facility];  Calorie   Eatin%  Nausea: No  Hypoglycemia and intervention: Yes; BG 54 this morning. Administered breakfast and held scheduled Novolog. Decreased basal insulin.   Fever: No  TPN and/or TF: No      BP (!) 142/69 (Patient Position: Lying)   Pulse 70   Temp 97.9 °F (36.6 °C) (Oral)   Resp 16   Ht 6' 2" (1.88 m)   Wt 76.4 kg (168 lb 6.9 oz)   SpO2 (!) 91%   BMI 21.63 kg/m²     Labs Reviewed and Include    Recent Labs   Lab 22  0345      CALCIUM 7.9*   ALBUMIN 1.9*   PROT 5.7*      K 4.0   CO2 24      BUN 20   CREATININE 0.9   ALKPHOS 99   ALT 10   AST 19   BILITOT 0.5     Lab Results   Component Value Date    WBC 10.23 2022    HGB 10.9 (L) 2022    HCT 33.8 (L) 2022    MCV 88 2022     2022     No results for input(s): TSH, FREET4 in the last 168 hours.  Lab Results   Component Value Date    HGBA1C 11.5 (H) 2022       Nutritional status:   Body mass index is 21.63 " kg/m².  Lab Results   Component Value Date    ALBUMIN 1.9 (L) 03/06/2022    ALBUMIN 1.8 (L) 03/05/2022    ALBUMIN 1.9 (L) 03/05/2022     No results found for: PREALBUMIN    Estimated Creatinine Clearance: 73.1 mL/min (based on SCr of 0.9 mg/dL).    Accu-Checks  Recent Labs     03/04/22  0737 03/04/22  1215 03/04/22  1712 03/04/22  2055 03/05/22  0937 03/05/22  1227 03/05/22  1616 03/05/22 2012 03/06/22  0446 03/06/22  0845   POCTGLUCOSE 221* 222* 131* 137* 429* 387* 377* 278* 92 54*       Current Medications and/or Treatments Impacting Glycemic Control  Immunotherapy:    Immunosuppressants       None          Steroids:   Hormones (From admission, onward)                Start     Stop Route Frequency Ordered    02/23/22 1824  melatonin tablet 6 mg         -- Oral Nightly PRN 02/23/22 1724          Pressors:    Autonomic Drugs (From admission, onward)                Start     Stop Route Frequency Ordered    03/05/22 1415  midodrine tablet 5 mg         -- Oral 3 times daily 03/05/22 1409          Hyperglycemia/Diabetes Medications:   Antihyperglycemics (From admission, onward)                Start     Stop Route Frequency Ordered    03/06/22 2100  insulin detemir U-100 pen 8 Units         -- SubQ Nightly 03/06/22 0854    03/06/22 1130  insulin aspart U-100 pen 8 Units         -- SubQ 3 times daily with meals 03/06/22 0854    03/05/22 1145  insulin aspart U-100 pen 0-5 Units         -- SubQ Before meals, nightly and at 0100 PRN 03/05/22 1034            ASSESSMENT and PLAN    Type 2 diabetes mellitus with hyperglycemia, with long-term current use of insulin  Endocrinology consulted for BG management.   BG goal 140-180    Patient requiring significantly high amounts of prandial insulin then basal insulin.     - Levemir Flex Pen 8 units nightly (20% reducation due to FBG below goal)   - Novolog (aspart) insulin 4-8 Units SQ TIDWM and prn for BG excursions MDC SSI (150/25). (20% increase due to prandial BG excursion)   -  BG checks AC/HS/0200      ** Please notify Endocrine for any change and/or advance in diet**  ** Please call Endocrine for any BG related issues **    Discharge Planning:   TBD. Please notify endocrinology prior to discharge.        Encephalopathy, metabolic    Managed per primary team  Avoid hypoglycemia      Diabetic ketoacidosis with coma associated with type 2 diabetes mellitus  Now resolved.     Will continue to monitor in the event of BG excursions.       COVID-19 virus infection  Infection may elevate BG readings  Managed per primary team               Cresencio Duke, DNP, FNP  Endocrinology  [facility] - Intensive Care ([place]

## 2022-03-06 NOTE — ASSESSMENT & PLAN NOTE
Hx of CAD s/p placement of 2 LAUREN in RCA in 01/09/2022.   - Continue home ASA, Plavix and statin  - Chest pain 3/4 after breakfast; EKG nonischemic. PPI, tums, carafate added

## 2022-03-06 NOTE — PLAN OF CARE
Problem: Adult Inpatient Plan of Care  Goal: Plan of Care Review  Outcome: Ongoing, Progressing  Goal: Patient-Specific Goal (Individualized)  Outcome: Ongoing, Progressing  Goal: Absence of Hospital-Acquired Illness or Injury  Outcome: Ongoing, Progressing  Goal: Optimal Comfort and Wellbeing  Outcome: Ongoing, Progressing  Goal: Readiness for Transition of Care  Outcome: Ongoing, Progressing     Problem: Diabetes Comorbidity  Goal: Blood Glucose Level Within Targeted Range  Outcome: Ongoing, Progressing     Problem: Fluid and Electrolyte Imbalance (Acute Kidney Injury/Impairment)  Goal: Fluid and Electrolyte Balance  Outcome: Ongoing, Progressing     Problem: Oral Intake Inadequate (Acute Kidney Injury/Impairment)  Goal: Optimal Nutrition Intake  Outcome: Ongoing, Progressing     Problem: Renal Function Impairment (Acute Kidney Injury/Impairment)  Goal: Effective Renal Function  Outcome: Ongoing, Progressing     Problem: Impaired Wound Healing  Goal: Optimal Wound Healing  Outcome: Ongoing, Progressing     Problem: Fall Injury Risk  Goal: Absence of Fall and Fall-Related Injury  Outcome: Ongoing, Progressing     Problem: Restraint, Nonbehavioral (Nonviolent)  Goal: Absence of Harm or Injury  Outcome: Ongoing, Progressing     Problem: Infection  Goal: Absence of Infection Signs and Symptoms  Outcome: Ongoing, Progressing     Problem: Adjustment to Illness (Sepsis/Septic Shock)  Goal: Optimal Coping  Outcome: Ongoing, Progressing     Problem: Bleeding (Sepsis/Septic Shock)  Goal: Absence of Bleeding  Outcome: Ongoing, Progressing     Problem: Glycemic Control Impaired (Sepsis/Septic Shock)  Goal: Blood Glucose Level Within Desired Range  Outcome: Ongoing, Progressing     Problem: Infection Progression (Sepsis/Septic Shock)  Goal: Absence of Infection Signs and Symptoms  Outcome: Ongoing, Progressing     Problem: Nutrition Impaired (Sepsis/Septic Shock)  Goal: Optimal Nutrition Intake  Outcome: Ongoing,  Progressing     Problem: Skin Injury Risk Increased  Goal: Skin Health and Integrity  Outcome: Ongoing, Progressing     Problem: Gas Exchange Impaired  Goal: Optimal Gas Exchange  Outcome: Ongoing, Progressing     Problem: Diabetic Ketoacidosis  Goal: Fluid and Electrolyte Balance with Absence of Ketosis  Outcome: Ongoing, Progressing    Patient alert and oriented. Cooperative with care this shift. Patient had hypotension this shift. Md notified. Patient had bolus of LR amount of 2000ml and a bolus of NS at 500cc. Abx started this shift as well. Patient is feeling better and bp is stable. Will continue to monitor.

## 2022-03-07 LAB
ALBUMIN SERPL BCP-MCNC: 1.9 G/DL (ref 3.5–5.2)
ALP SERPL-CCNC: 101 U/L (ref 55–135)
ALT SERPL W/O P-5'-P-CCNC: 12 U/L (ref 10–44)
ANION GAP SERPL CALC-SCNC: 9 MMOL/L (ref 8–16)
AST SERPL-CCNC: 20 U/L (ref 10–40)
BASOPHILS # BLD AUTO: 0.06 K/UL (ref 0–0.2)
BASOPHILS NFR BLD: 0.8 % (ref 0–1.9)
BILIRUB SERPL-MCNC: 0.4 MG/DL (ref 0.1–1)
BUN SERPL-MCNC: 16 MG/DL (ref 8–23)
CALCIUM SERPL-MCNC: 7.8 MG/DL (ref 8.7–10.5)
CHLORIDE SERPL-SCNC: 102 MMOL/L (ref 95–110)
CO2 SERPL-SCNC: 26 MMOL/L (ref 23–29)
CREAT SERPL-MCNC: 0.9 MG/DL (ref 0.5–1.4)
D DIMER PPP IA.FEU-MCNC: 0.43 MG/L FEU
DIFFERENTIAL METHOD: ABNORMAL
EOSINOPHIL # BLD AUTO: 0.3 K/UL (ref 0–0.5)
EOSINOPHIL NFR BLD: 4.4 % (ref 0–8)
ERYTHROCYTE [DISTWIDTH] IN BLOOD BY AUTOMATED COUNT: 16.2 % (ref 11.5–14.5)
EST. GFR  (AFRICAN AMERICAN): >60 ML/MIN/1.73 M^2
EST. GFR  (NON AFRICAN AMERICAN): >60 ML/MIN/1.73 M^2
FERRITIN SERPL-MCNC: 334 NG/ML (ref 20–300)
GLUCOSE SERPL-MCNC: 180 MG/DL (ref 70–110)
HCT VFR BLD AUTO: 34.9 % (ref 40–54)
HGB BLD-MCNC: 11.1 G/DL (ref 14–18)
IMM GRANULOCYTES # BLD AUTO: 0.02 K/UL (ref 0–0.04)
IMM GRANULOCYTES NFR BLD AUTO: 0.3 % (ref 0–0.5)
LDH SERPL L TO P-CCNC: 204 U/L (ref 110–260)
LYMPHOCYTES # BLD AUTO: 1.2 K/UL (ref 1–4.8)
LYMPHOCYTES NFR BLD: 16.5 % (ref 18–48)
MAGNESIUM SERPL-MCNC: 1.5 MG/DL (ref 1.6–2.6)
MCH RBC QN AUTO: 28.1 PG (ref 27–31)
MCHC RBC AUTO-ENTMCNC: 31.8 G/DL (ref 32–36)
MCV RBC AUTO: 88 FL (ref 82–98)
MONOCYTES # BLD AUTO: 0.5 K/UL (ref 0.3–1)
MONOCYTES NFR BLD: 7.2 % (ref 4–15)
NEUTROPHILS # BLD AUTO: 5.1 K/UL (ref 1.8–7.7)
NEUTROPHILS NFR BLD: 70.8 % (ref 38–73)
NRBC BLD-RTO: 0 /100 WBC
PHOSPHATE SERPL-MCNC: 2 MG/DL (ref 2.7–4.5)
PLATELET # BLD AUTO: 226 K/UL (ref 150–450)
PMV BLD AUTO: 9.6 FL (ref 9.2–12.9)
POCT GLUCOSE: 119 MG/DL (ref 70–110)
POCT GLUCOSE: 196 MG/DL (ref 70–110)
POCT GLUCOSE: 351 MG/DL (ref 70–110)
POCT GLUCOSE: 372 MG/DL (ref 70–110)
POCT GLUCOSE: 380 MG/DL (ref 70–110)
POTASSIUM SERPL-SCNC: 3.8 MMOL/L (ref 3.5–5.1)
PROT SERPL-MCNC: 5.7 G/DL (ref 6–8.4)
RBC # BLD AUTO: 3.95 M/UL (ref 4.6–6.2)
SODIUM SERPL-SCNC: 137 MMOL/L (ref 136–145)
WBC # BLD AUTO: 7.22 K/UL (ref 3.9–12.7)

## 2022-03-07 PROCEDURE — 97535 SELF CARE MNGMENT TRAINING: CPT

## 2022-03-07 PROCEDURE — 12000002 HC ACUTE/MED SURGE SEMI-PRIVATE ROOM

## 2022-03-07 PROCEDURE — 85379 FIBRIN DEGRADATION QUANT: CPT | Performed by: STUDENT IN AN ORGANIZED HEALTH CARE EDUCATION/TRAINING PROGRAM

## 2022-03-07 PROCEDURE — 83615 LACTATE (LD) (LDH) ENZYME: CPT | Performed by: STUDENT IN AN ORGANIZED HEALTH CARE EDUCATION/TRAINING PROGRAM

## 2022-03-07 PROCEDURE — 97116 GAIT TRAINING THERAPY: CPT

## 2022-03-07 PROCEDURE — 97530 THERAPEUTIC ACTIVITIES: CPT

## 2022-03-07 PROCEDURE — 63600175 PHARM REV CODE 636 W HCPCS: Mod: HCNC | Performed by: INTERNAL MEDICINE

## 2022-03-07 PROCEDURE — 25000003 PHARM REV CODE 250: Mod: HCNC | Performed by: STUDENT IN AN ORGANIZED HEALTH CARE EDUCATION/TRAINING PROGRAM

## 2022-03-07 PROCEDURE — 80053 COMPREHEN METABOLIC PANEL: CPT | Performed by: STUDENT IN AN ORGANIZED HEALTH CARE EDUCATION/TRAINING PROGRAM

## 2022-03-07 PROCEDURE — 25000003 PHARM REV CODE 250: Performed by: INTERNAL MEDICINE

## 2022-03-07 PROCEDURE — 83735 ASSAY OF MAGNESIUM: CPT | Performed by: STUDENT IN AN ORGANIZED HEALTH CARE EDUCATION/TRAINING PROGRAM

## 2022-03-07 PROCEDURE — 99232 SBSQ HOSP IP/OBS MODERATE 35: CPT | Mod: HCNC,,, | Performed by: NURSE PRACTITIONER

## 2022-03-07 PROCEDURE — 99232 PR SUBSEQUENT HOSPITAL CARE,LEVL II: ICD-10-PCS | Mod: ,,, | Performed by: STUDENT IN AN ORGANIZED HEALTH CARE EDUCATION/TRAINING PROGRAM

## 2022-03-07 PROCEDURE — 99232 SBSQ HOSP IP/OBS MODERATE 35: CPT | Mod: ,,, | Performed by: STUDENT IN AN ORGANIZED HEALTH CARE EDUCATION/TRAINING PROGRAM

## 2022-03-07 PROCEDURE — 85025 COMPLETE CBC W/AUTO DIFF WBC: CPT | Performed by: STUDENT IN AN ORGANIZED HEALTH CARE EDUCATION/TRAINING PROGRAM

## 2022-03-07 PROCEDURE — 82728 ASSAY OF FERRITIN: CPT | Performed by: STUDENT IN AN ORGANIZED HEALTH CARE EDUCATION/TRAINING PROGRAM

## 2022-03-07 PROCEDURE — 84100 ASSAY OF PHOSPHORUS: CPT | Performed by: STUDENT IN AN ORGANIZED HEALTH CARE EDUCATION/TRAINING PROGRAM

## 2022-03-07 PROCEDURE — 36415 COLL VENOUS BLD VENIPUNCTURE: CPT | Performed by: STUDENT IN AN ORGANIZED HEALTH CARE EDUCATION/TRAINING PROGRAM

## 2022-03-07 PROCEDURE — 63600175 PHARM REV CODE 636 W HCPCS: Mod: HCNC | Performed by: STUDENT IN AN ORGANIZED HEALTH CARE EDUCATION/TRAINING PROGRAM

## 2022-03-07 PROCEDURE — 27000207 HC ISOLATION

## 2022-03-07 PROCEDURE — 25000003 PHARM REV CODE 250: Performed by: STUDENT IN AN ORGANIZED HEALTH CARE EDUCATION/TRAINING PROGRAM

## 2022-03-07 PROCEDURE — 99232 PR SUBSEQUENT HOSPITAL CARE,LEVL II: ICD-10-PCS | Mod: HCNC,,, | Performed by: NURSE PRACTITIONER

## 2022-03-07 RX ORDER — MAGNESIUM SULFATE HEPTAHYDRATE 40 MG/ML
2 INJECTION, SOLUTION INTRAVENOUS ONCE
Status: COMPLETED | OUTPATIENT
Start: 2022-03-07 | End: 2022-03-07

## 2022-03-07 RX ADMIN — ATORVASTATIN CALCIUM 40 MG: 20 TABLET, FILM COATED ORAL at 09:03

## 2022-03-07 RX ADMIN — ACETAMINOPHEN 650 MG: 325 TABLET ORAL at 08:03

## 2022-03-07 RX ADMIN — OXYCODONE HYDROCHLORIDE AND ACETAMINOPHEN 500 MG: 500 TABLET ORAL at 09:03

## 2022-03-07 RX ADMIN — INSULIN ASPART 5 UNITS: 100 INJECTION, SOLUTION INTRAVENOUS; SUBCUTANEOUS at 08:03

## 2022-03-07 RX ADMIN — SUCRALFATE 1 G: 1 TABLET ORAL at 05:03

## 2022-03-07 RX ADMIN — GABAPENTIN 200 MG: 100 CAPSULE ORAL at 08:03

## 2022-03-07 RX ADMIN — Medication 400 MG: at 09:03

## 2022-03-07 RX ADMIN — GABAPENTIN 200 MG: 100 CAPSULE ORAL at 09:03

## 2022-03-07 RX ADMIN — DICLOFENAC SODIUM 4 G: 10 GEL TOPICAL at 12:03

## 2022-03-07 RX ADMIN — MULTIPLE VITAMINS W/ MINERALS TAB 1 TABLET: TAB at 09:03

## 2022-03-07 RX ADMIN — INSULIN ASPART 4 UNITS: 100 INJECTION, SOLUTION INTRAVENOUS; SUBCUTANEOUS at 06:03

## 2022-03-07 RX ADMIN — APIXABAN 5 MG: 5 TABLET, FILM COATED ORAL at 08:03

## 2022-03-07 RX ADMIN — CLOPIDOGREL 75 MG: 75 TABLET, FILM COATED ORAL at 09:03

## 2022-03-07 RX ADMIN — SUCRALFATE 1 G: 1 TABLET ORAL at 12:03

## 2022-03-07 RX ADMIN — PANTOPRAZOLE SODIUM 40 MG: 40 TABLET, DELAYED RELEASE ORAL at 09:03

## 2022-03-07 RX ADMIN — PANTOPRAZOLE SODIUM 40 MG: 40 TABLET, DELAYED RELEASE ORAL at 08:03

## 2022-03-07 RX ADMIN — DICLOFENAC SODIUM 4 G: 10 GEL TOPICAL at 05:03

## 2022-03-07 RX ADMIN — Medication 400 MG: at 08:03

## 2022-03-07 RX ADMIN — AMIODARONE HYDROCHLORIDE 200 MG: 200 TABLET ORAL at 09:03

## 2022-03-07 RX ADMIN — LACOSAMIDE 100 MG: 100 TABLET, FILM COATED ORAL at 09:03

## 2022-03-07 RX ADMIN — OXYCODONE 5 MG: 5 TABLET ORAL at 12:03

## 2022-03-07 RX ADMIN — ACETAMINOPHEN 650 MG: 325 TABLET ORAL at 02:03

## 2022-03-07 RX ADMIN — DICLOFENAC SODIUM 4 G: 10 GEL TOPICAL at 08:03

## 2022-03-07 RX ADMIN — LACOSAMIDE 100 MG: 100 TABLET, FILM COATED ORAL at 08:03

## 2022-03-07 RX ADMIN — APIXABAN 5 MG: 5 TABLET, FILM COATED ORAL at 09:03

## 2022-03-07 RX ADMIN — INSULIN ASPART 4 UNITS: 100 INJECTION, SOLUTION INTRAVENOUS; SUBCUTANEOUS at 02:03

## 2022-03-07 RX ADMIN — SUCRALFATE 1 G: 1 TABLET ORAL at 08:03

## 2022-03-07 RX ADMIN — OXYCODONE HYDROCHLORIDE AND ACETAMINOPHEN 500 MG: 500 TABLET ORAL at 08:03

## 2022-03-07 RX ADMIN — DICLOFENAC SODIUM 4 G: 10 GEL TOPICAL at 09:03

## 2022-03-07 RX ADMIN — ACETAMINOPHEN 650 MG: 325 TABLET ORAL at 09:03

## 2022-03-07 RX ADMIN — GABAPENTIN 200 MG: 100 CAPSULE ORAL at 02:03

## 2022-03-07 RX ADMIN — PIPERACILLIN AND TAZOBACTAM 4.5 G: 4; .5 INJECTION, POWDER, LYOPHILIZED, FOR SOLUTION INTRAVENOUS; PARENTERAL at 12:03

## 2022-03-07 RX ADMIN — MAGNESIUM SULFATE IN WATER 2 G: 40 INJECTION, SOLUTION INTRAVENOUS at 09:03

## 2022-03-07 RX ADMIN — ASPIRIN 81 MG: 81 TABLET, COATED ORAL at 09:03

## 2022-03-07 NOTE — PLAN OF CARE
Problem: Adult Inpatient Plan of Care  Goal: Plan of Care Review  Outcome: Ongoing, Progressing  Goal: Patient-Specific Goal (Individualized)  Outcome: Ongoing, Progressing  Goal: Absence of Hospital-Acquired Illness or Injury  Outcome: Ongoing, Progressing  Goal: Optimal Comfort and Wellbeing  Outcome: Ongoing, Progressing  Goal: Readiness for Transition of Care  Outcome: Ongoing, Progressing     Problem: Diabetes Comorbidity  Goal: Blood Glucose Level Within Targeted Range  Outcome: Ongoing, Progressing     Problem: Fluid and Electrolyte Imbalance (Acute Kidney Injury/Impairment)  Goal: Fluid and Electrolyte Balance  Outcome: Ongoing, Progressing     Problem: Oral Intake Inadequate (Acute Kidney Injury/Impairment)  Goal: Optimal Nutrition Intake  Outcome: Ongoing, Progressing     Problem: Renal Function Impairment (Acute Kidney Injury/Impairment)  Goal: Effective Renal Function  Outcome: Ongoing, Progressing     Problem: Impaired Wound Healing  Goal: Optimal Wound Healing  Outcome: Ongoing, Progressing     Problem: Fall Injury Risk  Goal: Absence of Fall and Fall-Related Injury  Outcome: Ongoing, Progressing     Problem: Restraint, Nonbehavioral (Nonviolent)  Goal: Absence of Harm or Injury  Outcome: Ongoing, Progressing     Problem: Infection  Goal: Absence of Infection Signs and Symptoms  Outcome: Ongoing, Progressing     Problem: Adjustment to Illness (Sepsis/Septic Shock)  Goal: Optimal Coping  Outcome: Ongoing, Progressing     Problem: Bleeding (Sepsis/Septic Shock)  Goal: Absence of Bleeding  Outcome: Ongoing, Progressing     Problem: Glycemic Control Impaired (Sepsis/Septic Shock)  Goal: Blood Glucose Level Within Desired Range  Outcome: Ongoing, Progressing     Problem: Infection Progression (Sepsis/Septic Shock)  Goal: Absence of Infection Signs and Symptoms  Outcome: Ongoing, Progressing     Problem: Nutrition Impaired (Sepsis/Septic Shock)  Goal: Optimal Nutrition Intake  Outcome: Ongoing,  Progressing     Problem: Skin Injury Risk Increased  Goal: Skin Health and Integrity  Outcome: Ongoing, Progressing     Problem: Gas Exchange Impaired  Goal: Optimal Gas Exchange  Outcome: Ongoing, Progressing     Problem: Diabetic Ketoacidosis  Goal: Fluid and Electrolyte Balance with Absence of Ketosis  Outcome: Ongoing, Progressing       Patient alert and oriented this shift. Vital signs within normal limits this shift. FBS this am was low, but endocrinology wanted to hold on glucose and let patient eat. Patients sugar at lunch and dinner was above 200. Insulin given as ordered. Patient worked with therapy this shift and was up to bedside chair for 1 hour. Patient assisted back to bed by nursing. Patient is resting comfortably at this time

## 2022-03-07 NOTE — PROGRESS NOTES
Therapy with vancomycin complete and/or consult discontinued by provider. Pharmacy will sign off, please re-consult as needed.    Charmaine Pablo, PharmD, BCPS  [id number]

## 2022-03-07 NOTE — ASSESSMENT & PLAN NOTE
Resolved  Patient's hypotension on 3/5 resolved after fluids. He had labs consistent with pre-renal azotemia causing an BRENDAN as well that responded after fluids. Hypovolemia perhaps secondary to diuresis due to uncontrolled hyperglycemia.   - BCx x2 ordered, NGTD. CXR slightly improved from before when patient admitted for COVID. UA not collected but patient denying any dysuria.   - Discontinuing antibiotics today.

## 2022-03-07 NOTE — ASSESSMENT & PLAN NOTE
Now resolved.     Will continue to monitor in the event of BG excursions.      Operative Report


Date of Service


Aug 15, 2018.





Operative Report


Procedure performed:  Cardioversion


Indication:  Atrial flutter


Staff cardiologist:  Christopher Kocher, MD





Procedure in detail:





The patient was informed of the risks benefits and alternatives to the intended 

procedure.  He understood such which proceed.  He was taken to the cardiac 

catheterization suite holding area.  A general anesthetic was administered by 

the Anesthesiology Service.  Once appropriately anesthetized, the patient was 

cardioverted using 30 joules delivered in a biphasic fashion. This returned the 

patient to sinus rhythm.  The patient tolerated procedure well, there were no 

immediate complications.  Patient was neurologically intact subsequent to the 

procedure.





Impression:





Successful cardioversion from atrial flutter to normal sinus rhythm


I attest to the content of the Intraoperative Record and any orders documented 

therein.  Any exceptions are noted below.

## 2022-03-07 NOTE — SUBJECTIVE & OBJECTIVE
"Interval HPI:   Overnight events: Patient on the IMTA unit in room 49568/82807 A. Blood glucose variable. BG at, above and below goal on current insulin regimen (SSI, prandial, and basal insulin ). Patient's BG is extremely difficult to manage. Possible sepsis can be contributing to glucose lability.  Steroid use- None.    Renal function- Normal   Vasopressors-  None         Diet diabetic [facility];  Calorie   Eatin%  Nausea: No  Hypoglycemia and intervention: No  Fever: No  TPN and/or TF: No      BP (!) 165/69 (BP Location: Right arm, Patient Position: Lying)   Pulse 69   Temp 98 °F (36.7 °C) (Oral)   Resp 18   Ht 6' 2" (1.88 m)   Wt 76.4 kg (168 lb 6.9 oz)   SpO2 (!) 93%   BMI 21.63 kg/m²     Labs Reviewed and Include    Recent Labs   Lab 22  0429   *   CALCIUM 7.8*   ALBUMIN 1.9*   PROT 5.7*      K 3.8   CO2 26      BUN 16   CREATININE 0.9   ALKPHOS 101   ALT 12   AST 20   BILITOT 0.4     Lab Results   Component Value Date    WBC 7.22 2022    HGB 11.1 (L) 2022    HCT 34.9 (L) 2022    MCV 88 2022     2022     No results for input(s): TSH, FREET4 in the last 168 hours.  Lab Results   Component Value Date    HGBA1C 11.5 (H) 2022       Nutritional status:   Body mass index is 21.63 kg/m².  Lab Results   Component Value Date    ALBUMIN 1.9 (L) 2022    ALBUMIN 1.9 (L) 2022    ALBUMIN 1.8 (L) 2022     No results found for: PREALBUMIN    Estimated Creatinine Clearance: 73.1 mL/min (based on SCr of 0.9 mg/dL).    Accu-Checks  Recent Labs     22  0937 22  1227 22  1616 22  0446 22  0845 22  1133 22  1643 03/0622  0743   POCTGLUCOSE 429* 387* 377* 278* 92 54* 200* 373* 299* 119*       Current Medications and/or Treatments Impacting Glycemic Control  Immunotherapy:    Immunosuppressants       None          Steroids:   Hormones (From admission, " onward)                Start     Stop Route Frequency Ordered    02/23/22 1824  melatonin tablet 6 mg         -- Oral Nightly PRN 02/23/22 1724          Pressors:    Autonomic Drugs (From admission, onward)                None          Hyperglycemia/Diabetes Medications:   Antihyperglycemics (From admission, onward)                Start     Stop Route Frequency Ordered    03/06/22 2100  insulin detemir U-100 pen 8 Units         -- SubQ Nightly 03/06/22 0854    03/06/22 1130  insulin aspart U-100 pen 4-8 Units         -- SubQ 3 times daily with meals 03/06/22 1043    03/05/22 1145  insulin aspart U-100 pen 0-5 Units         -- SubQ Before meals, nightly and at 0100 PRN 03/05/22 1034

## 2022-03-07 NOTE — PROGRESS NOTES
[facility] ([place] [address])  Endocrinology  Progress Note    Admit Date: 2/19/2022     Reason for Consult: Management of T2DM, Hyperglycemia     Diabetes diagnosis year: 2010    Home Diabetes Medications:  Novolog 9 TIDWM + SSI; Levemir 20 units qd; Metformin 1000 mg BID    How often checking glucose at home? 1-3 x day   BG readings on regimen: 100-150's  Hypoglycemia on the regimen?  No  Missed doses on regimen?  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 History was not obtained from patient due to encephalopathy. Patient's daughter at bedside reports the patient was recently in the ED on 2/17 for a mechanical fall after being discharged from rehab the same day. CTH and cervical spine revealed  evolving late subacute infarct involving the right frontal lobe with questionable petechial hemorrhage. Vascular neurology evaluated the patient and cleared him for discharge from without any new interventions. He was also tested positive for COVID-19 and was discharged yesterday from the ED. He subsequently received one dose of sotrovimab infusion for COVID and was in a normal state of health until this morning when his daughter found him lethargic and barely responsive prompting her to bring him to the ED. She reports the patient had no complaints prior to developing his AMS. Of note, the patient was recently admitted to [facility] from 01/25 through 02/13 for AMS concerning for CVA, however he was treated for metabolic encephalopathy 2/2 to DKA/HHS, sepsis and BRENDAN. Upon arrival to the ED, he was placed on 6L NC,  "normotensive and tachycardic. Lactic 11.5, WBC 17.8, Glucose 1109, pH 7.17, Co2 15.6 HCO3 <5, AG unable to calculate. sCr 3.1. CXR with intersitial opacities. He received ~4L of IVF, vancomycin, zosyn, initiated on insulin shifted for hyperkalemia and calcium for cardioprotection. ECG without noticeable ischemic changes. CTH without new changes- discussed findings with radiology. Patient was admitted to the MICU for further management. Endocrine consulted to manage type 2 diabetes and hyperglycemia. Since admission patient has had fluctuations in BG control.             Interval HPI:   Overnight events: Patient on the IMTA unit in room 69063/51935 A. Blood glucose variable. BG at, above and below goal on current insulin regimen (SSI, prandial, and basal insulin ). Patient's BG is extremely difficult to manage. Possible sepsis can be contributing to glucose lability.  Steroid use- None.    Renal function- Normal   Vasopressors-  None         Diet diabetic [facility];  Calorie   Eatin%  Nausea: No  Hypoglycemia and intervention: No  Fever: No  TPN and/or TF: No      BP (!) 165/69 (BP Location: Right arm, Patient Position: Lying)   Pulse 69   Temp 98 °F (36.7 °C) (Oral)   Resp 18   Ht 6' 2" (1.88 m)   Wt 76.4 kg (168 lb 6.9 oz)   SpO2 (!) 93%   BMI 21.63 kg/m²     Labs Reviewed and Include    Recent Labs   Lab 22  0429   *   CALCIUM 7.8*   ALBUMIN 1.9*   PROT 5.7*      K 3.8   CO2 26      BUN 16   CREATININE 0.9   ALKPHOS 101   ALT 12   AST 20   BILITOT 0.4     Lab Results   Component Value Date    WBC 7.22 2022    HGB 11.1 (L) 2022    HCT 34.9 (L) 2022    MCV 88 2022     2022     No results for input(s): TSH, FREET4 in the last 168 hours.  Lab Results   Component Value Date    HGBA1C 11.5 (H) 2022       Nutritional status:   Body mass index is 21.63 kg/m².  Lab Results   Component Value Date    ALBUMIN 1.9 (L) 2022    " ALBUMIN 1.9 (L) 03/06/2022    ALBUMIN 1.8 (L) 03/05/2022     No results found for: PREALBUMIN    Estimated Creatinine Clearance: 73.1 mL/min (based on SCr of 0.9 mg/dL).    Accu-Checks  Recent Labs     03/05/22  0937 03/05/22  1227 03/05/22  1616 03/05/22 2012 03/06/22  0446 03/06/22  0845 03/06/22  1133 03/06/22  1643 03/06/22  1959 03/07/22  0743   POCTGLUCOSE 429* 387* 377* 278* 92 54* 200* 373* 299* 119*       Current Medications and/or Treatments Impacting Glycemic Control  Immunotherapy:    Immunosuppressants       None          Steroids:   Hormones (From admission, onward)                Start     Stop Route Frequency Ordered    02/23/22 1824  melatonin tablet 6 mg         -- Oral Nightly PRN 02/23/22 1724          Pressors:    Autonomic Drugs (From admission, onward)                None          Hyperglycemia/Diabetes Medications:   Antihyperglycemics (From admission, onward)                Start     Stop Route Frequency Ordered    03/06/22 2100  insulin detemir U-100 pen 8 Units         -- SubQ Nightly 03/06/22 0854    03/06/22 1130  insulin aspart U-100 pen 4-8 Units         -- SubQ 3 times daily with meals 03/06/22 1043    03/05/22 1145  insulin aspart U-100 pen 0-5 Units         -- SubQ Before meals, nightly and at 0100 PRN 03/05/22 1034            ASSESSMENT and PLAN    Type 2 diabetes mellitus with hyperglycemia, with long-term current use of insulin  Endocrinology consulted for BG management.   BG goal 140-180    - Levemir Flex Pen 8 units nightly    - Novolog (aspart) insulin 4-8 Units SQ TIDWM and prn for BG excursions MDC SSI (150/25).  - BG checks AC/HS/0200      ** Please notify Endocrine for any change and/or advance in diet**  ** Please call Endocrine for any BG related issues **    Discharge Planning:   TBD. Please notify endocrinology prior to discharge.        Encephalopathy, metabolic    Managed per primary team  Avoid hypoglycemia      Diabetic ketoacidosis with coma associated with type  2 diabetes mellitus  Now resolved.     Will continue to monitor in the event of BG excursions.       COVID-19 virus infection  Infection may elevate BG readings  Managed per primary team               Cresencio Duke, LORIE, FNP  Endocrinology  [facility] ([place])

## 2022-03-07 NOTE — PROGRESS NOTES
[facility] - [facility] ([address] Medicine  Progress Note    Patient Name: Kamar Muñoz  MRN: [id number]  Patient Class: IP- Inpatient   Admission Date: 2/19/2022  Length of Stay: 16 days  Attending Physician: Naida Mitchell MD  Primary Care Provider: Basim Guerrero MD        Subjective:     Principal Problem:Hypotension        HPI:  Kamar Muñoz is a 76 year old male with past medical history of CAD s/p 2 LAUREN in RCA (Jan 2022), HTN, HLD, p. A. 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 History was not obtained from patient due to encephalopathy. Patient's daughter at bedside reports the patient was recently in the ED on 2/17 for a mechanical fall after being discharged from rehab the same day. CTH and cervical spine revealed  evolving late subacute infarct involving the right frontal lobe with questionable petechial hemorrhage. Vascular neurology evaluated the patient and cleared him for discharge from without any new interventions. He was also tested positive for COVID-19 and was discharged yesterday from the ED. He subsequently received one dose of sotrovimab infusion for COVID and was in a normal state of health until this morning when his daughter found him lethargic and barely responsive prompting her to bring him to the ED. She reports the patient had no complaints prior to developing his AMS. Of note, the patient was recently admitted to [facility] from 01/25 through 02/13 for AMS concerning for CVA, however he was treated for metabolic  encephalopathy 2/2 to DKA/HHS, sepsis and BRENDAN.      Upon arrival to the ED, he was placed on 6L NC, normotensive and tachycardic. Lactic 11.5, WBC 17.8, Glucose 1109, pH 7.17, Co2 15.6 HCO3 <5, AG unable to calculate. sCr 3.1. CXR with intersitial opacities. He received ~4L of IVF, vancomycin, zosyn, initiated on insulin shifted for hyperkalemia and calcium for cardioprotection. ECG without noticeable ischemic changes. CTH without new changes- discussed findings with radiology. Patient was admitted to the MICU for further management.        Overview/Hospital Course:  ICU Course:  Placed on remdesivir and broad spectrum IV abx in addition to insulin per DKA protocol. In MICU: Weaned supp O2 to room air; Transitioned to subq insulin; Improvement of AMS. BRENDAN improving gradually. Pt had discussion w/ family in MICU and transitioned from full code to DNR/DNI.  Step down to HM initiated 2/21.    Hospital Medicine Course:  Pt w/ episode of CP and hypoglycemia upon stepdown. Ischemic work-up largely unremarkable with trop down trending from admission. Detemir dosing adjusted from BID to qhs. He had additional hypoglycemic episode upon re-uptitration of long-acting insulin from 12 to 15. Dosing decreased to 13u as patient was hyperglycemic to 230s w/12u qhs. Worsening hyperglycemia to 270s- insulin dose modified to 3u TID wm, and detemir 14u qhs.    Episode of abdominal pain and diarrhea 02/25- appeared to be 2/2 antibiotic use. Antibiotics stopped 02/25. Pt w/persistent diarrhea- c diff studies sent- loperamide stopped.     Pt was found to have St 3 sacral pressure ulcer.     Patient reportedly hypotensive on 3/5 and endorsing nausea, septic work up ordered but ultimately unrevealing. Patient's BP responded after 2.5L of fluid. Presume hypovolemia was the source of his hypotension      Interval History: No acute events overnight. Patient states that his nausea has substantially improved and that he has been eating more. He  reports occasional nausea still but less persistent. Denies any reflux symptoms today. He also reports stable sacral pain.     Review of Systems   Constitutional:  Negative for chills and fever.   HENT:  Negative for congestion and sore throat.    Eyes:  Negative for photophobia and visual disturbance.   Respiratory:  Negative for cough and shortness of breath.    Cardiovascular:  Negative for chest pain and palpitations.   Gastrointestinal:  Positive for nausea. Negative for abdominal pain, blood in stool, constipation, diarrhea and vomiting.   Endocrine: Negative for cold intolerance and heat intolerance.   Genitourinary:  Negative for dysuria and hematuria.   Musculoskeletal:  Positive for back pain (near sacral wound). Negative for arthralgias and myalgias.   Skin:  Negative for rash and wound.   Allergic/Immunologic: Negative for environmental allergies and food allergies.   Neurological:  Negative for seizures and numbness.   Hematological:  Negative for adenopathy. Does not bruise/bleed easily.   Psychiatric/Behavioral:  Negative for hallucinations and suicidal ideas.    Objective:     Vital Signs (Most Recent):  Temp: 98 °F (36.7 °C) (03/07/22 0930)  Pulse: 69 (03/07/22 0930)  Resp: 18 (03/07/22 0930)  BP: (!) 165/69 (03/07/22 0930)  SpO2: (!) 93 % (03/07/22 0930)   Vital Signs (24h Range):  Temp:  [97.7 °F (36.5 °C)-99.1 °F (37.3 °C)] 98 °F (36.7 °C)  Pulse:  [67-88] 69  Resp:  [16-20] 18  SpO2:  [90 %-98 %] 93 %  BP: (107-165)/(56-75) 165/69     Weight: 76.4 kg (168 lb 6.9 oz)  Body mass index is 21.63 kg/m².    Intake/Output Summary (Last 24 hours) at 3/7/2022 1120  Last data filed at 3/7/2022 0600  Gross per 24 hour   Intake 100 ml   Output 260 ml   Net -160 ml        Physical Exam  Constitutional:       General: He is not in acute distress.     Appearance: He is well-developed. He is ill-appearing.   HENT:      Head: Normocephalic and atraumatic.   Eyes:      Conjunctiva/sclera: Conjunctivae normal.       Pupils: Pupils are equal, round, and reactive to light.   Neck:      Thyroid: No thyromegaly.      Vascular: No JVD.   Cardiovascular:      Rate and Rhythm: Normal rate and regular rhythm.      Heart sounds: Normal heart sounds. No murmur heard.    No friction rub. No gallop.   Pulmonary:      Effort: Pulmonary effort is normal. No respiratory distress.      Breath sounds: Normal breath sounds. No wheezing or rales.   Abdominal:      General: Bowel sounds are normal. There is no distension.      Palpations: Abdomen is soft. There is no mass.      Tenderness: There is no abdominal tenderness. There is no guarding or rebound.      Hernia: No hernia is present.   Musculoskeletal:         General: No deformity. Normal range of motion.      Cervical back: Normal range of motion and neck supple.   Skin:     General: Skin is warm and dry.      Comments: Sacral wound without any worsening erythema noted, unable to express any purulence.   Neurological:      Mental Status: He is alert and oriented to person, place, and time.      Cranial Nerves: No cranial nerve deficit.   Psychiatric:         Behavior: Behavior normal.       Significant Labs: All pertinent labs within the past 24 hours have been reviewed.  CBC:   Recent Labs   Lab 03/05/22  1407 03/06/22  0345 03/07/22  0429   WBC 10.86 10.23 7.22   HGB 10.9* 10.9* 11.1*   HCT 34.5* 33.8* 34.9*    254 226       CMP:   Recent Labs   Lab 03/05/22  1939 03/06/22  0345 03/07/22  0429   * 136 137   K 4.4 4.0 3.8    103 102   CO2 23 24 26   * 102 180*   BUN 24* 20 16   CREATININE 1.2 0.9 0.9   CALCIUM 8.0* 7.9* 7.8*   PROT 5.5* 5.7* 5.7*   ALBUMIN 1.8* 1.9* 1.9*   BILITOT 0.5 0.5 0.4   ALKPHOS 102 99 101   AST 17 19 20   ALT 9* 10 12   ANIONGAP 10 9 9   EGFRNONAA 57.6* >60.0 >60.0         Significant Imaging: I have reviewed all pertinent imaging results/findings within the past 24 hours.      Assessment/Plan:      * Hypotension  Resolved  Patient's  hypotension on 3/5 resolved after fluids. He had labs consistent with pre-renal azotemia causing an BRENDAN as well that responded after fluids. Hypovolemia perhaps secondary to diuresis due to uncontrolled hyperglycemia.   - BCx x2 ordered, NGTD. CXR slightly improved from before when patient admitted for COVID. UA not collected but patient denying any dysuria.   - Discontinuing antibiotics today.       Discharge planning issues  PT/OT recommend rehab.  Patient's wife will be going to [facility] and family wish for them to stay together.  COVID isolation complete on 3/9.    Pressure injury of buttock, unstageable  Seen by wound care with [facility] started    Severe sepsis  Upon admission:  Organ dysfunction indicated by Acute kidney injury, Encephalopathy  and Acute respiratory failure  Source- Unclear. CXR with diffuse interstitial opacities, however no consolidation noted. UA with pyuria, without elevated leuks. Blood cultures pending. Possibly due to sacral wound, although doesn't look grossly infected.   Started on broad spectrum abx at admission with vanc/Zosyn/doxy.     Palliative care status  Per MICU: patient wishes to be DNR/DNI and family agrees. Still want to continue full medical care     COVID-19 virus infection  Viral Pneumonia due to COVID-19  COVID vaccinated with booster.   Received 1 dose of sotrovimab infusion 02/18/2022  - Diagnostics: Trend LDH, CRP, Ferritin, D-dimer Q48hrs  - Monitoring:          - Telemetry & Continuous Pulse Oximetry  - Completed 5 days of remdesivir, had dexamethasone held initially due to DKA. Currently asymptomatic from COVID.   - On room air    - Nutrition:           - Multivitamin PO daily          - Add Boost supplement          - Vitamin D 1000IU daily if deficient          - Ascorbic acid 500mg PO bid    - Supportive Care:          - acetaminophen 650mg PO Q6hr PRN fever/headache          - loperamide PRN viral diarrhea    Anticipate end isolation on 3/9/2022    Diabetic  ketoacidosis with coma associated with type 2 diabetes mellitus  RESOLVED  Patient with uncontrolled DM presenting with metabolic encephalopathy in the setting of DKA with coma. Suspect patient developed DKA in the setting of sepsis/ COVID-19   Glucose 1109, pH 7.17, Co2 15.6 HCO3 <5, AG unable to calculate  DKA protocol completed and DKA resolved in MICU and Pt stepped down on subq insulin.  Hypoglycemia episode upon step-down, detemir adjusted from BID to qhs per home long acting insulin and due to episode of hypoglycemia   Continue aspart TIDWM  Diabetic diet  POCT BGx4  Cotinue to titrate short and long acting insulin needs as indicated to hospital goal 140-180  Endocrinology continues to adjust insulin doses daily.    Focal seizures  Continue home lancosamide     Encephalopathy, metabolic  In the setting of DKA and sepsis upon admission.  Admit CT Head without any concerning new findings- no new infarct as discussed with Radiology  Resolved.    BRENDAN (acute kidney injury)  Resolved  sCr maxed at 3.1, baseline 1.1-1.3. Likely prerenal in the setting of DKA. Improved with treatment of DKA. Had worsening renal function when hypotensive from hypovolemia 3/5 that also improved with fluids  - Avoid nephrotoxins, renally dose meds    Paroxysmal atrial fibrillation  History of paroxysmal atrial fibrillation on home  Metoprolol XL 50 mg daily, diltiazem  mg daily and amiodarone 200 mg daily.  - Continue po apixaban  - Continue amiodarone  - Holding metoprolol due to hypotension/bradycardia    Embolic stroke involving right middle cerebral artery  Hx of CVA in Jan 2022. CT Head with evolving infarcts in right frontal and left cerebellar hemispheres. Has had issues with memory per family since CVA dx  No concern for acute stroke this admit per discussion with radiology.   - Continue home antiplatelets, statin and Eliquis    Coronary artery disease involving native coronary artery of native heart with unstable angina  pectoris  Hx of CAD s/p placement of 2 LAUREN in RCA in 01/09/2022.   - Continue home ASA, Plavix and statin  - Chest pain 3/4 after breakfast; EKG nonischemic. PPI, tums, carafate added    Pulmonary nodules  Has stable bilateral pulmonary nodules/masses with broad differential per outpatient pulmonology notes. No pathology report as none of the nodules appeared to be safe for biopsy given high risk of PTX is high with many adjacent bulla. Had in-depthy discussion with patient's daughter, Basia Herrera, in regards to patients lung nodules and his overall state of health. Emphasized working on promoting functional independence for the time being with possible addressing of nodules in the distant future, which is highly dependent upon his overall functionality. She understands that ultimately it may be more beneficial to leave nodules alone as well.     Chronic pulmonary heart disease  Follows up with Pulmonary clinic in [place]. Last seen in December and was evaluated for pulmonary nodules. Deemed to have mild COPD per notes. Not on any maintenance therapy.  - Continue albuterol inhaler     Type 2 diabetes mellitus with hyperglycemia, with long-term current use of insulin  See DKA  Glucoses remain erratic; Endocrine consulted and managing  - Levemir 8u qhs  - Aspart 4-8u ac/hs pending BG values    Hypertension associated with diabetes  Holding home meds due to normotension/hypotension  - was on losartan 25mg, toprol xl 50mg, dilt 120mg at home  - Will restart losartan 25mg first if hypertension remains.   - See pAF      VTE Risk Mitigation (From admission, onward)         Ordered     apixaban tablet 5 mg  2 times daily         02/19/22 2202     IP VTE HIGH RISK PATIENT  Once         02/19/22 2144     Place sequential compression device  Until discontinued         02/19/22 2144                Discharge Planning   ALLIE: 3/9/2022     Code Status: DNR   Is the patient medically ready for discharge?: Yes    Reason for patient  still in hospital (select all that apply): Patient trending condition  Discharge Plan A: Rehab   Discharge Delays: None known at this time              Naida Mitchell MD  Department of Hospital Medicine   [facility] - Intensive Care ([place]-[facility])

## 2022-03-07 NOTE — SUBJECTIVE & OBJECTIVE
Interval History: No acute events overnight. Patient states that his nausea has substantially improved and that he has been eating more. He reports occasional nausea still but less persistent. Denies any reflux symptoms today. He also reports stable sacral pain.     Review of Systems   Constitutional:  Negative for chills and fever.   HENT:  Negative for congestion and sore throat.    Eyes:  Negative for photophobia and visual disturbance.   Respiratory:  Negative for cough and shortness of breath.    Cardiovascular:  Negative for chest pain and palpitations.   Gastrointestinal:  Positive for nausea. Negative for abdominal pain, blood in stool, constipation, diarrhea and vomiting.   Endocrine: Negative for cold intolerance and heat intolerance.   Genitourinary:  Negative for dysuria and hematuria.   Musculoskeletal:  Positive for back pain (near sacral wound). Negative for arthralgias and myalgias.   Skin:  Negative for rash and wound.   Allergic/Immunologic: Negative for environmental allergies and food allergies.   Neurological:  Negative for seizures and numbness.   Hematological:  Negative for adenopathy. Does not bruise/bleed easily.   Psychiatric/Behavioral:  Negative for hallucinations and suicidal ideas.    Objective:     Vital Signs (Most Recent):  Temp: 98 °F (36.7 °C) (03/07/22 0930)  Pulse: 69 (03/07/22 0930)  Resp: 18 (03/07/22 0930)  BP: (!) 165/69 (03/07/22 0930)  SpO2: (!) 93 % (03/07/22 0930)   Vital Signs (24h Range):  Temp:  [97.7 °F (36.5 °C)-99.1 °F (37.3 °C)] 98 °F (36.7 °C)  Pulse:  [67-88] 69  Resp:  [16-20] 18  SpO2:  [90 %-98 %] 93 %  BP: (107-165)/(56-75) 165/69     Weight: 76.4 kg (168 lb 6.9 oz)  Body mass index is 21.63 kg/m².    Intake/Output Summary (Last 24 hours) at 3/7/2022 1120  Last data filed at 3/7/2022 0600  Gross per 24 hour   Intake 100 ml   Output 260 ml   Net -160 ml        Physical Exam  Constitutional:       General: He is not in acute distress.     Appearance: He is  well-developed. He is ill-appearing.   HENT:      Head: Normocephalic and atraumatic.   Eyes:      Conjunctiva/sclera: Conjunctivae normal.      Pupils: Pupils are equal, round, and reactive to light.   Neck:      Thyroid: No thyromegaly.      Vascular: No JVD.   Cardiovascular:      Rate and Rhythm: Normal rate and regular rhythm.      Heart sounds: Normal heart sounds. No murmur heard.    No friction rub. No gallop.   Pulmonary:      Effort: Pulmonary effort is normal. No respiratory distress.      Breath sounds: Normal breath sounds. No wheezing or rales.   Abdominal:      General: Bowel sounds are normal. There is no distension.      Palpations: Abdomen is soft. There is no mass.      Tenderness: There is no abdominal tenderness. There is no guarding or rebound.      Hernia: No hernia is present.   Musculoskeletal:         General: No deformity. Normal range of motion.      Cervical back: Normal range of motion and neck supple.   Skin:     General: Skin is warm and dry.      Comments: Sacral wound without any worsening erythema noted, unable to express any purulence.   Neurological:      Mental Status: He is alert and oriented to person, place, and time.      Cranial Nerves: No cranial nerve deficit.   Psychiatric:         Behavior: Behavior normal.       Significant Labs: All pertinent labs within the past 24 hours have been reviewed.  CBC:   Recent Labs   Lab 03/05/22  1407 03/06/22  0345 03/07/22  0429   WBC 10.86 10.23 7.22   HGB 10.9* 10.9* 11.1*   HCT 34.5* 33.8* 34.9*    254 226       CMP:   Recent Labs   Lab 03/05/22  1939 03/06/22  0345 03/07/22  0429   * 136 137   K 4.4 4.0 3.8    103 102   CO2 23 24 26   * 102 180*   BUN 24* 20 16   CREATININE 1.2 0.9 0.9   CALCIUM 8.0* 7.9* 7.8*   PROT 5.5* 5.7* 5.7*   ALBUMIN 1.8* 1.9* 1.9*   BILITOT 0.5 0.5 0.4   ALKPHOS 102 99 101   AST 17 19 20   ALT 9* 10 12   ANIONGAP 10 9 9   EGFRNONAA 57.6* >60.0 >60.0         Significant Imaging: I  have reviewed all pertinent imaging results/findings within the past 24 hours.

## 2022-03-07 NOTE — ASSESSMENT & PLAN NOTE
Hx of CVA in Jan 2022. CT Head with evolving infarcts in right frontal and left cerebellar hemispheres. Has had issues with memory per family since CVA dx  No concern for acute stroke this admit per discussion with radiology.   - Continue home antiplatelets, statin and Eliquis

## 2022-03-07 NOTE — ASSESSMENT & PLAN NOTE
History of paroxysmal atrial fibrillation on home  Metoprolol XL 50 mg daily, diltiazem  mg daily and amiodarone 200 mg daily.  - Continue po apixaban  - Continue amiodarone  - Holding metoprolol due to hypotension/bradycardia

## 2022-03-07 NOTE — ASSESSMENT & PLAN NOTE
Has stable bilateral pulmonary nodules/masses with broad differential per outpatient pulmonology notes. No pathology report as none of the nodules appeared to be safe for biopsy given high risk of PTX is high with many adjacent bulla. Had in-depthy discussion with patient's daughter, Basia Herrera, in regards to patients lung nodules and his overall state of health. Emphasized working on promoting functional independence for the time being with possible addressing of nodules in the distant future, which is highly dependent upon his overall functionality. She understands that ultimately it may be more beneficial to leave nodules alone as well.

## 2022-03-07 NOTE — PT/OT/SLP PROGRESS
Occupational Therapy   Treatment    Name: Kamar Muñoz  MRN: [id number]  Admitting Diagnosis:  Hypotension       Recommendations:     Discharge Recommendations: nursing facility, skilled  Discharge Equipment Recommendations:  other (see comments)  Barriers to discharge:  None    Assessment:     Kamar Muñoz is a 76 y.o. male with a medical diagnosis of Hypotension. Pt progressing well - increased functional mobility this session (30'). Tolerating sitting OOB for longer periods as well. Pt alert and interactive but still demonstrates moments of confusion (baseline?). Performance deficits affecting function are weakness, impaired endurance, impaired self care skills, impaired functional mobilty, gait instability, impaired balance, impaired cognition, decreased coordination, decreased safety awareness.     Rehab Prognosis:  Good; patient would benefit from acute skilled OT services to address these deficits and reach maximum level of function.       Plan:     Patient to be seen 4 x/week to address the above listed problems via self-care/home management, therapeutic activities, therapeutic exercises, neuromuscular re-education  · Plan of Care Expires: 03/22/22  · Plan of Care Reviewed with: patient    Subjective     Pain/Comfort:  · Pain Rating 1: 0/10    Objective:     Communicated with: rn prior to session.  Patient found supine with oxygen, pulse ox (continuous), telemetry upon OT entry to room.    General Precautions: Standard, fall   Orthopedic Precautions:N/A   Braces:    Respiratory Status: Nasal cannula, flow 1 L/min     Occupational Performance:     Bed Mobility:    · Patient completed Rolling/Turning to Left with  stand by assistance  · Patient completed Scooting/Bridging with stand by assistance  · Patient completed Supine to Sit with stand by assistance     Functional Mobility/Transfers:  · Patient completed Sit <> Stand Transfer with minimum assistance  with  rolling walker   · Patient completed Bed  <> Chair Transfer using Step Transfer technique with minimum assistance with rolling walker  · Functional Mobility: Pt walked 6' then 15' CGA/Min A using a RW.    Activities of Daily Living:  · Grooming: supervision from chair (hair and teeth) - declined in standing.    [facility] 6 Click ADL: 18    Treatment & Education:  Discussed OT POC and progress.  Educated on calling for assistance as needed.    Patient left up in chair with all lines intact and call button in reachEducation:      GOALS:   Multidisciplinary Problems     Occupational Therapy Goals        Problem: Occupational Therapy Goal    Goal Priority Disciplines Outcome Interventions   Occupational Therapy Goal     OT, PT/OT Ongoing, Progressing    Description: Goals to be met by: 3/6/22     Patient will increase functional independence with ADLs by performing:    Feeding with [place]. MET 3/3  UE Dressing with Stand-by Assistance.  LE Dressing with Minimal Assistance.  Grooming while standing with Stand-by Assistance.  Toileting from toilet with Stand-by Assistance for hygiene and clothing management. MET 3/6 from toilet  Toilet transfer to toilet with Stand-by Assistance.                     Time Tracking:     OT Date of Treatment: 03/07/22  OT Start Time: 0948  OT Stop Time: 1026  OT Total Time (min): 38 min    Billable Minutes:Self Care/Home Management 12  Therapeutic Activity 26    OT/JUAN: OT          3/7/2022

## 2022-03-07 NOTE — ASSESSMENT & PLAN NOTE
Upon admission:  Organ dysfunction indicated by Acute kidney injury, Encephalopathy  and Acute respiratory failure  Source- Unclear. CXR with diffuse interstitial opacities, however no consolidation noted. UA with pyuria, without elevated leuks. Blood cultures pending. Possibly due to sacral wound, although doesn't look grossly infected.   Started on broad spectrum abx at admission with vanc/Zosyn/doxy.

## 2022-03-07 NOTE — ASSESSMENT & PLAN NOTE
Endocrinology consulted for BG management.   BG goal 140-180    - Levemir Flex Pen 8 units nightly    - Novolog (aspart) insulin 4-8 Units SQ TIDWM and prn for BG excursions [facility] SSI (150/25).  - BG checks AC/HS/0200      ** Please notify Endocrine for any change and/or advance in diet**  ** Please call Endocrine for any BG related issues **    Discharge Planning:   TBD. Please notify endocrinology prior to discharge.

## 2022-03-07 NOTE — PT/OT/SLP PROGRESS
"Physical Therapy Treatment  CO-Tx with OT    Patient Name:  Kamar Muñoz   MRN:  [id number]    Co-treatment performed due to patient's multiple deficits requiring two skilled therapists to appropriately and safely assess patient's strength and endurance while facilitating functional tasks in addition to accommodating for patient's activity tolerance.   Recommendations:     Discharge Recommendations:  nursing facility, skilled   Discharge Equipment Recommendations: none   Barriers to discharge: Increased skilled assistance needed    Assessment:     Kamar Muñoz is a 76 y.o. male admitted with a medical diagnosis of Hypotension.  He presents with the following impairments/functional limitations:  weakness, impaired endurance, impaired self care skills, impaired functional mobilty, gait instability, impaired balance, decreased coordination, impaired cognition, decreased upper extremity function, decreased lower extremity function, decreased safety awareness. Patient tolerated session well. He is progressing well with mobility and is eager to improve. Patient Patient would continue to benefit from skilled PT services while in the hospital. At this time, upon d/c recommend SNF in order for patient to progress towards an improved level of functional mobility independence.     Rehab Prognosis: Good; patient would benefit from acute skilled PT services to address these deficits and reach maximum level of function.    Recent Surgery: * No surgery found *      Plan:     During this hospitalization, patient to be seen 4 x/week to address the identified rehab impairments via therapeutic activities, gait training, therapeutic exercises, neuromuscular re-education and progress toward the following goals:    · Plan of Care Expires:  03/22/22    Subjective     Chief Complaint: weakness  Patient/Family Comments/goals: "I am going down the street."  Pain/Comfort:  · Pain Rating 1: 0/10  · Pain Rating Post-Intervention 1: " 0/10      Objective:     Communicated with RN prior to session.  Kamar Muñoz found HOB elevated with telemetry, peripheral IV, oxygen, pulse ox (continuous) upon PT entry to room.     General Precautions: Standard, contact, droplet   Orthopedic Precautions:N/A   Braces: N/A     Functional Mobility:  · Bed Mobility:     · Rolling Left:  stand by assistance  · Scooting: stand by assistance  · Supine to Sit: stand by assistance  · Transfers:     · Sit to Stand:  minimum assistance with rolling walker  · 1 stand from EOB   · 1 stand from bed chair   · Gait: 6ft to chair + 15ft with Gema + RW  ·  narrow NICOLE, FFP, vc's to improve NICOLE, decreased hip extension   · Decreased weight shifting onto LLE   · Balance: sitting EOB - SBA; static standing - Gema; dynamic standing- Gema       AM-PAC 6 CLICK MOBILITY  Turning over in bed (including adjusting bedclothes, sheets and blankets)?: 4  Sitting down on and standing up from a chair with arms (e.g., wheelchair, bedside commode, etc.): 3  Moving from lying on back to sitting on the side of the bed?: 3  Moving to and from a bed to a chair (including a wheelchair)?: 3  Need to walk in hospital room?: 3  Climbing 3-5 steps with a railing?: 2  Basic Mobility Total Score: 18       Therapeutic Activities and Education:  -Patient educated on the continued role and goal of PT  -Questions and concerns answered within the the PT scope of practice.   -White board updated in patient room to reflect level of assistance needed with nursing.   -Patient Gema + RW for mobility with RN    Kamar Muñoz left up in chair with all lines intact, call button in reach and RN notified..    GOALS:   Multidisciplinary Problems     Physical Therapy Goals        Problem: Physical Therapy Goal    Goal Priority Disciplines Outcome Goal Variances Interventions   Physical Therapy Goal     PT, PT/OT Ongoing, Progressing     Description: Goals to be met by: 3/10/22     Patient will increase functional  independence with mobility by performin. Supine to sit with MInimal Assistance - met   1a. Supine to sit with SBA   2. Sit to supine with MInimal Assistance - met   2a. Sit to supine with SBA   3. Sit to stand transfer with Minimal Assistance - met   3a. Sit to stand transfer SBA  4. Gait  x 25 feet with Minimal Assistance using LRAD, if needed. - met   4a. Gait x150 ft with SBA using RW or LRAD as needed  5. Sitting at edge of bed x10 minutes with Modified [place]  6. Stand for 3 minutes with Minimal Assistance using LRAD, if needed  7. Lower extremity exercise program x10 reps per handout, with independence                     Time Tracking:     PT Received On: 22  PT Start Time: 0948     PT Stop Time: 1026  PT Total Time (min): 38 min     Billable Minutes: Gait Training 23 and Therapeutic Activity 15    Treatment Type: Treatment  PT/PTA: PT     PTA Visit Number: 0     Deloris Galaviz, PT  2022

## 2022-03-07 NOTE — ASSESSMENT & PLAN NOTE
Resolved  sCr maxed at 3.1, baseline 1.1-1.3. Likely prerenal in the setting of DKA. Improved with treatment of DKA. Had worsening renal function when hypotensive from hypovolemia 3/5 that also improved with fluids  - Avoid nephrotoxins, renally dose meds

## 2022-03-07 NOTE — ASSESSMENT & PLAN NOTE
Holding home meds due to normotension/hypotension  - was on losartan 25mg, toprol xl 50mg, dilt 120mg at home  - Will restart losartan 25mg first if hypertension remains.   - See pAF

## 2022-03-07 NOTE — PLAN OF CARE
SW faxed updated clinicals to SNF referrals for review. Patient has no accepting facilities at this time. JASIEL will continue to follow.      Marley Gregg LMSW   - [facility]  Ext. [phone]

## 2022-03-08 LAB
ALBUMIN SERPL BCP-MCNC: 2 G/DL (ref 3.5–5.2)
ALP SERPL-CCNC: 122 U/L (ref 55–135)
ALT SERPL W/O P-5'-P-CCNC: 16 U/L (ref 10–44)
ANION GAP SERPL CALC-SCNC: 8 MMOL/L (ref 8–16)
AST SERPL-CCNC: 27 U/L (ref 10–40)
BASOPHILS # BLD AUTO: 0.06 K/UL (ref 0–0.2)
BASOPHILS NFR BLD: 0.8 % (ref 0–1.9)
BILIRUB SERPL-MCNC: 0.5 MG/DL (ref 0.1–1)
BUN SERPL-MCNC: 11 MG/DL (ref 8–23)
CALCIUM SERPL-MCNC: 8.1 MG/DL (ref 8.7–10.5)
CHLORIDE SERPL-SCNC: 102 MMOL/L (ref 95–110)
CO2 SERPL-SCNC: 29 MMOL/L (ref 23–29)
CREAT SERPL-MCNC: 0.7 MG/DL (ref 0.5–1.4)
DIFFERENTIAL METHOD: ABNORMAL
EOSINOPHIL # BLD AUTO: 0.4 K/UL (ref 0–0.5)
EOSINOPHIL NFR BLD: 5 % (ref 0–8)
ERYTHROCYTE [DISTWIDTH] IN BLOOD BY AUTOMATED COUNT: 16.3 % (ref 11.5–14.5)
EST. GFR  (AFRICAN AMERICAN): >60 ML/MIN/1.73 M^2
EST. GFR  (NON AFRICAN AMERICAN): >60 ML/MIN/1.73 M^2
GLUCOSE SERPL-MCNC: 51 MG/DL (ref 70–110)
HCT VFR BLD AUTO: 36.2 % (ref 40–54)
HGB BLD-MCNC: 11.6 G/DL (ref 14–18)
IMM GRANULOCYTES # BLD AUTO: 0.01 K/UL (ref 0–0.04)
IMM GRANULOCYTES NFR BLD AUTO: 0.1 % (ref 0–0.5)
LYMPHOCYTES # BLD AUTO: 2.1 K/UL (ref 1–4.8)
LYMPHOCYTES NFR BLD: 29 % (ref 18–48)
MAGNESIUM SERPL-MCNC: 1.7 MG/DL (ref 1.6–2.6)
MCH RBC QN AUTO: 27.7 PG (ref 27–31)
MCHC RBC AUTO-ENTMCNC: 32 G/DL (ref 32–36)
MCV RBC AUTO: 86 FL (ref 82–98)
MONOCYTES # BLD AUTO: 0.7 K/UL (ref 0.3–1)
MONOCYTES NFR BLD: 9.7 % (ref 4–15)
NEUTROPHILS # BLD AUTO: 4.1 K/UL (ref 1.8–7.7)
NEUTROPHILS NFR BLD: 55.4 % (ref 38–73)
NRBC BLD-RTO: 0 /100 WBC
PHOSPHATE SERPL-MCNC: 1.9 MG/DL (ref 2.7–4.5)
PLATELET # BLD AUTO: 259 K/UL (ref 150–450)
PMV BLD AUTO: 9.6 FL (ref 9.2–12.9)
POCT GLUCOSE: 171 MG/DL (ref 70–110)
POCT GLUCOSE: 278 MG/DL (ref 70–110)
POCT GLUCOSE: 347 MG/DL (ref 70–110)
POCT GLUCOSE: 431 MG/DL (ref 70–110)
POCT GLUCOSE: 45 MG/DL (ref 70–110)
POCT GLUCOSE: 69 MG/DL (ref 70–110)
POTASSIUM SERPL-SCNC: 3.6 MMOL/L (ref 3.5–5.1)
PROT SERPL-MCNC: 6.2 G/DL (ref 6–8.4)
RBC # BLD AUTO: 4.19 M/UL (ref 4.6–6.2)
SARS-COV-2 RNA RESP QL NAA+PROBE: NOT DETECTED
SODIUM SERPL-SCNC: 139 MMOL/L (ref 136–145)
WBC # BLD AUTO: 7.34 K/UL (ref 3.9–12.7)

## 2022-03-08 PROCEDURE — 25000003 PHARM REV CODE 250: Performed by: STUDENT IN AN ORGANIZED HEALTH CARE EDUCATION/TRAINING PROGRAM

## 2022-03-08 PROCEDURE — 80053 COMPREHEN METABOLIC PANEL: CPT | Performed by: STUDENT IN AN ORGANIZED HEALTH CARE EDUCATION/TRAINING PROGRAM

## 2022-03-08 PROCEDURE — 25000003 PHARM REV CODE 250: Performed by: INTERNAL MEDICINE

## 2022-03-08 PROCEDURE — 99233 SBSQ HOSP IP/OBS HIGH 50: CPT | Mod: 95,CR,, | Performed by: INTERNAL MEDICINE

## 2022-03-08 PROCEDURE — 12000002 HC ACUTE/MED SURGE SEMI-PRIVATE ROOM

## 2022-03-08 PROCEDURE — 99232 PR SUBSEQUENT HOSPITAL CARE,LEVL II: ICD-10-PCS | Mod: GC,,, | Performed by: INTERNAL MEDICINE

## 2022-03-08 PROCEDURE — U0003 INFECTIOUS AGENT DETECTION BY NUCLEIC ACID (DNA OR RNA); SEVERE ACUTE RESPIRATORY SYNDROME CORONAVIRUS 2 (SARS-COV-2) (CORONAVIRUS DISEASE [COVID-19]), AMPLIFIED PROBE TECHNIQUE, MAKING USE OF HIGH THROUGHPUT TECHNOLOGIES AS DESCRIBED BY CMS-2020-01-R: HCPCS | Performed by: INTERNAL MEDICINE

## 2022-03-08 PROCEDURE — 99233 PR SUBSEQUENT HOSPITAL CARE,LEVL III: ICD-10-PCS | Mod: 95,CR,, | Performed by: INTERNAL MEDICINE

## 2022-03-08 PROCEDURE — 84100 ASSAY OF PHOSPHORUS: CPT | Performed by: STUDENT IN AN ORGANIZED HEALTH CARE EDUCATION/TRAINING PROGRAM

## 2022-03-08 PROCEDURE — 99232 SBSQ HOSP IP/OBS MODERATE 35: CPT | Mod: GC,,, | Performed by: INTERNAL MEDICINE

## 2022-03-08 PROCEDURE — 85025 COMPLETE CBC W/AUTO DIFF WBC: CPT | Performed by: STUDENT IN AN ORGANIZED HEALTH CARE EDUCATION/TRAINING PROGRAM

## 2022-03-08 PROCEDURE — U0005 INFEC AGEN DETEC AMPLI PROBE: HCPCS | Performed by: INTERNAL MEDICINE

## 2022-03-08 PROCEDURE — 97116 GAIT TRAINING THERAPY: CPT

## 2022-03-08 PROCEDURE — 83735 ASSAY OF MAGNESIUM: CPT | Performed by: STUDENT IN AN ORGANIZED HEALTH CARE EDUCATION/TRAINING PROGRAM

## 2022-03-08 PROCEDURE — 27000207 HC ISOLATION

## 2022-03-08 RX ORDER — AMOXICILLIN 250 MG
1 CAPSULE ORAL 2 TIMES DAILY
Status: DISCONTINUED | OUTPATIENT
Start: 2022-03-08 | End: 2022-03-10 | Stop reason: HOSPADM

## 2022-03-08 RX ORDER — INSULIN ASPART 100 [IU]/ML
4 INJECTION, SOLUTION INTRAVENOUS; SUBCUTANEOUS
Status: DISCONTINUED | OUTPATIENT
Start: 2022-03-08 | End: 2022-03-09

## 2022-03-08 RX ADMIN — Medication 400 MG: at 08:03

## 2022-03-08 RX ADMIN — PANTOPRAZOLE SODIUM 40 MG: 40 TABLET, DELAYED RELEASE ORAL at 08:03

## 2022-03-08 RX ADMIN — GABAPENTIN 200 MG: 100 CAPSULE ORAL at 03:03

## 2022-03-08 RX ADMIN — OXYCODONE HYDROCHLORIDE AND ACETAMINOPHEN 500 MG: 500 TABLET ORAL at 08:03

## 2022-03-08 RX ADMIN — ACETAMINOPHEN 650 MG: 325 TABLET ORAL at 08:03

## 2022-03-08 RX ADMIN — ACETAMINOPHEN 650 MG: 325 TABLET ORAL at 03:03

## 2022-03-08 RX ADMIN — ASPIRIN 81 MG: 81 TABLET, COATED ORAL at 08:03

## 2022-03-08 RX ADMIN — INSULIN ASPART 4 UNITS: 100 INJECTION, SOLUTION INTRAVENOUS; SUBCUTANEOUS at 09:03

## 2022-03-08 RX ADMIN — DIBASIC SODIUM PHOSPHATE, MONOBASIC POTASSIUM PHOSPHATE AND MONOBASIC SODIUM PHOSPHATE 2 TABLET: 852; 155; 130 TABLET ORAL at 10:03

## 2022-03-08 RX ADMIN — INSULIN ASPART 2 UNITS: 100 INJECTION, SOLUTION INTRAVENOUS; SUBCUTANEOUS at 12:03

## 2022-03-08 RX ADMIN — SUCRALFATE 1 G: 1 TABLET ORAL at 12:03

## 2022-03-08 RX ADMIN — LACOSAMIDE 100 MG: 100 TABLET, FILM COATED ORAL at 09:03

## 2022-03-08 RX ADMIN — MULTIPLE VITAMINS W/ MINERALS TAB 1 TABLET: TAB at 08:03

## 2022-03-08 RX ADMIN — Medication 400 MG: at 09:03

## 2022-03-08 RX ADMIN — APIXABAN 5 MG: 5 TABLET, FILM COATED ORAL at 08:03

## 2022-03-08 RX ADMIN — APIXABAN 5 MG: 5 TABLET, FILM COATED ORAL at 09:03

## 2022-03-08 RX ADMIN — ACETAMINOPHEN 650 MG: 325 TABLET ORAL at 09:03

## 2022-03-08 RX ADMIN — ATORVASTATIN CALCIUM 40 MG: 20 TABLET, FILM COATED ORAL at 08:03

## 2022-03-08 RX ADMIN — SUCRALFATE 1 G: 1 TABLET ORAL at 05:03

## 2022-03-08 RX ADMIN — Medication 24 G: at 07:03

## 2022-03-08 RX ADMIN — SUCRALFATE 1 G: 1 TABLET ORAL at 09:03

## 2022-03-08 RX ADMIN — DICLOFENAC SODIUM 4 G: 10 GEL TOPICAL at 08:03

## 2022-03-08 RX ADMIN — GABAPENTIN 200 MG: 100 CAPSULE ORAL at 09:03

## 2022-03-08 RX ADMIN — SUCRALFATE 1 G: 1 TABLET ORAL at 06:03

## 2022-03-08 RX ADMIN — DICLOFENAC SODIUM 4 G: 10 GEL TOPICAL at 09:03

## 2022-03-08 RX ADMIN — OXYCODONE HYDROCHLORIDE AND ACETAMINOPHEN 500 MG: 500 TABLET ORAL at 09:03

## 2022-03-08 RX ADMIN — CLOPIDOGREL 75 MG: 75 TABLET, FILM COATED ORAL at 08:03

## 2022-03-08 RX ADMIN — DICLOFENAC SODIUM 4 G: 10 GEL TOPICAL at 03:03

## 2022-03-08 RX ADMIN — INSULIN ASPART 3 UNITS: 100 INJECTION, SOLUTION INTRAVENOUS; SUBCUTANEOUS at 05:03

## 2022-03-08 RX ADMIN — AMIODARONE HYDROCHLORIDE 200 MG: 200 TABLET ORAL at 08:03

## 2022-03-08 RX ADMIN — DICLOFENAC SODIUM 4 G: 10 GEL TOPICAL at 05:03

## 2022-03-08 RX ADMIN — PANTOPRAZOLE SODIUM 40 MG: 40 TABLET, DELAYED RELEASE ORAL at 09:03

## 2022-03-08 RX ADMIN — LACOSAMIDE 100 MG: 100 TABLET, FILM COATED ORAL at 08:03

## 2022-03-08 RX ADMIN — GABAPENTIN 200 MG: 100 CAPSULE ORAL at 08:03

## 2022-03-08 NOTE — ASSESSMENT & PLAN NOTE
Follows up with Pulmonary clinic in [place]. Last seen in December and was evaluated for pulmonary nodules. Deemed to have mild COPD per notes. Not on any maintenance therapy.  - Continue albuterol inhaler

## 2022-03-08 NOTE — PLAN OF CARE
Problem: Adult Inpatient Plan of Care  Goal: Plan of Care Review  Outcome: Ongoing, Progressing     Problem: Diabetes Comorbidity  Goal: Blood Glucose Level Within Targeted Range  Outcome: Ongoing, Progressing     Problem: Oral Intake Inadequate (Acute Kidney Injury/Impairment)  Goal: Optimal Nutrition Intake  Outcome: Ongoing, Progressing     Problem: Impaired Wound Healing  Goal: Optimal Wound Healing  Outcome: Ongoing, Progressing     Problem: Fall Injury Risk  Goal: Absence of Fall and Fall-Related Injury  Outcome: Ongoing, Progressing     Problem: Infection  Goal: Absence of Infection Signs and Symptoms  Outcome: Ongoing, Progressing     Problem: Glycemic Control Impaired (Sepsis/Septic Shock)  Goal: Blood Glucose Level Within Desired Range  Outcome: Ongoing, Progressing  Pt alert and oriented. Denies pain. There is no s/s of distress. Still in isolation for covid-19. POC reviewed with the pt and safety was maintained throughout the shift.

## 2022-03-08 NOTE — ASSESSMENT & PLAN NOTE
Holding home meds due to normotension/hypotension  - was on losartan 25mg, Toprol xl 50mg, diltiazem 120mg at home  - Will restart losartan 25mg first if hypertension remains.   - See pAF

## 2022-03-08 NOTE — CONSULTS
[facility] - Intensive Care ([place] [id number])  [facility]  Telemedicine Consult Note    Patient Name: Kamar Muñoz  MRN: [id number]  Admission Date: 2/19/2022  Hospital Length of Stay: 16 days  Attending Physician: Naida iMtchell MD   Primary Care Provider: Basim Guerrero MD     Thank you for your consult to [facility]. We have reviewed the patient chart. This patient does meet criteria for [facility] service at this time. Will assume care on 03/08/22 at 7AM.        Tiffany Awad MD  Department of Hospital Medicine   [facility] - Intensive [facility] ([place])

## 2022-03-08 NOTE — ASSESSMENT & PLAN NOTE
sCr maxed at 3.1, baseline 1.1-1.3. Likely prerenal in the setting of DKA. Improved with treatment of DKA.   Had worsening renal function when hypotensive from hypovolemia 3/5 that also improved with fluids  - Avoid nephrotoxins, renally dose meds

## 2022-03-08 NOTE — PROGRESS NOTES
[facility] - Intensive Care ([place] [address])  [facility] Medicine  Telemedicine Progress Note    Patient Name: Kamar Muñoz  MRN: [id number]  Patient Class: IP- Inpatient   Admission Date: 2/19/2022  Length of Stay: 17 days  Attending Physician: Tiffany Awad MD  Primary Care Provider: Basim Guerrero MD    Subjective:     Principal Problem:Hypotension    HPI:  Kamar Muñoz is a 76 year old male with past medical history of CAD s/p 2 LAUREN in RCA (Jan 2022), HTN, HLD, p. A. 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 History was not obtained from patient due to encephalopathy. Patient's daughter at bedside reports the patient was recently in the ED on 2/17 for a mechanical fall after being discharged from rehab the same day. CTH and cervical spine revealed  evolving late subacute infarct involving the right frontal lobe with questionable petechial hemorrhage. Vascular neurology evaluated the patient and cleared him for discharge from without any new interventions. He was also tested positive for COVID-19 and was discharged yesterday from the ED. He subsequently received one dose of sotrovimab infusion for COVID and was in a normal state of health until this morning when his daughter found him lethargic and barely responsive prompting her to bring him to the ED. She reports the patient had no complaints prior to developing his AMS. Of note, the patient was recently admitted to [facility] from 01/25 through 02/13 for AMS concerning for CVA, however he was treated for  metabolic encephalopathy 2/2 to DKA/HHS, sepsis and BRENDAN.      Upon arrival to the ED, he was placed on 6L NC, normotensive and tachycardic. Lactic 11.5, WBC 17.8, Glucose 1109, pH 7.17, Co2 15.6 HCO3 <5, AG unable to calculate. sCr 3.1. CXR with intersitial opacities. He received ~4L of IVF, vancomycin, zosyn, initiated on insulin shifted for hyperkalemia and calcium for cardioprotection. ECG without noticeable ischemic changes. CTH without new changes- discussed findings with radiology. Patient was admitted to the MICU for further management.        Overview/Hospital Course:  ICU Course:  Placed on remdesivir and broad spectrum IV abx in addition to insulin per DKA protocol. In MICU: Weaned supp O2 to room air; Transitioned to subq insulin; Improvement of AMS. BRENDAN improving gradually. Pt had discussion w/ family in MICU and transitioned from full code to DNR/DNI.  Step down to  initiated 2/21.    Hospital Medicine Course:  Pt w/ episode of CP and hypoglycemia upon stepdown. Ischemic work-up largely unremarkable with trop down trending from admission. Detemir dosing adjusted from BID to qhs. He had additional hypoglycemic episode upon re-uptitration of long-acting insulin from 12 to 15. Dosing decreased to 13u as patient was hyperglycemic to 230s w/12u qhs. Worsening hyperglycemia to 270s- insulin dose modified to 3u TID wm, and detemir 14u qhs.    Episode of abdominal pain and diarrhea 02/25- appeared to be 2/2 antibiotic use. Antibiotics stopped 02/25. Pt w/persistent diarrhea- c diff studies sent- loperamide stopped.     Pt was found to have St 3 sacral pressure ulcer.     Patient reportedly hypotensive on 3/5 and endorsing nausea, septic work up ordered but ultimately unrevealing. Patient's BP responded after 2.5L of fluid. Presume hypovolemia was the source of his hypotension      Telemedicine  This service was provided by Virtual Visit.    Patient was transferred to [facility] on:   3/8/2022    Chief Complaint   Patient presents with    Vomiting     Vomiting and altered mental status all day. COVID+     The patient location is: [id number] A   Admitted 2/19/2022  6:57 PM  Present with the patient at the time of the telemed/virtual assessment: N/A    Interval History / Events Overnight:   The patient is able to provide adequate history. Additional history was obtained from past medical records. No significant events reported by Nursing.   Last charted BM 3/3. Continues to complain of wound pain. Still on O2. Hypoglycemic overnight.  Patient complains of fatigue. Symptoms have improved since yesterday. Associated symptoms include: malaise. Symptoms are decreasing in severity.     Lab test(s) reviewed: H&H stable; hypoglycemia    Review of Systems   Constitutional:  Negative for fever.   Skin:  Positive for wound.     Objective:     Vital Signs (Most Recent):  Temp: 97.7 °F (36.5 °C) (03/08/22 1058)  Pulse: 76 (03/08/22 1215)  Resp: 20 (03/08/22 1058)  BP: (!) 146/72 (03/08/22 1100)  SpO2: 95 % (03/08/22 1215) Vital Signs (24h Range):  Temp:  [97.5 °F (36.4 °C)-98.5 °F (36.9 °C)] 97.7 °F (36.5 °C)  Pulse:  [69-81] 76  Resp:  [18-20] 20  SpO2:  [92 %-99 %] 95 %  BP: (113-162)/(58-99) 146/72     Weight: 76.4 kg (168 lb 6.9 oz)  Body mass index is 21.63 kg/m².    Intake/Output Summary (Last 24 hours) at 3/8/2022 1328  Last data filed at 3/8/2022 0800  Gross per 24 hour   Intake 0 ml   Output 820 ml   Net -820 ml        Physical Exam  Constitutional:       General: He is not in acute distress.     Appearance: Normal appearance.   Eyes:      General: Lids are normal. No scleral icterus.        Right eye: No discharge.         Left eye: No discharge.      Conjunctiva/sclera: Conjunctivae normal.   Neck:      Trachea: Phonation normal.   Cardiovascular:      Rate and Rhythm: Normal rate.      Comments: Monitor / Vital signs reviewed at time of visit  Pulmonary:      Effort: Pulmonary effort is normal.  No tachypnea, accessory muscle usage or respiratory distress.   Abdominal:      General: There is no distension.   Skin:     Coloration: Skin is not cyanotic.   Neurological:      Mental Status: He is alert. He is not disoriented.   Psychiatric:         Attention and Perception: Attention normal.         Mood and Affect: Affect normal.         Behavior: Behavior is cooperative.       Significant Labs:     Recent Labs   Lab 12/29/21  0810 01/26/22  1512   HGBA1C 12.3* 11.5*       Recent Labs   Lab 03/08/22  0814 03/08/22  0947 03/08/22  1100   POCTGLUCOSE 69* 171* 278*       Recent Labs   Lab 03/06/22  0345 03/07/22  0429 03/08/22  0556   WBC 10.23 7.22 7.34   HGB 10.9* 11.1* 11.6*   HCT 33.8* 34.9* 36.2*    226 259       Recent Labs   Lab 03/06/22  0345 03/07/22  0429 03/08/22  0556   GRAN 74.5*  7.6 70.8  5.1 55.4  4.1   LYMPH 14.8*  1.5 16.5*  1.2 29.0  2.1   MONO 6.5  0.7 7.2  0.5 9.7  0.7   EOS 0.3 0.3 0.4       Recent Labs   Lab 03/06/22  0345 03/07/22  0429 03/08/22  0556    137 139   K 4.0 3.8 3.6    102 102   CO2 24 26 29   BUN 20 16 11   CREATININE 0.9 0.9 0.7    180* 51*   CALCIUM 7.9* 7.8* 8.1*   ALBUMIN 1.9* 1.9* 2.0*   MG 1.8 1.5* 1.7   PHOS 2.4* 2.0* 1.9*       Recent Labs   Lab 03/06/22 0345 03/07/22  0429 03/08/22  0556   ALKPHOS 99 101 122   ALT 10 12 16   AST 19 20 27   PROT 5.7* 5.7* 6.2   BILITOT 0.5 0.4 0.5       Procalcitonin (ng/mL)   Date Value   02/19/2022 0.93 (H)     Lactate (Lactic Acid) (mmol/L)   Date Value   03/05/2022 1.0   03/05/2022 1.9   02/20/2022 4.4 (HH)   02/20/2022 4.9 (HH)   02/19/2022 11.5 (HH)     BNP (pg/mL)   Date Value   02/19/2022 481 (H)   01/25/2022 658 (H)   02/01/2019 60   11/27/2018 33   07/02/2018 87     Sed Rate   Date Value   02/19/2022 92 mm/Hr (H)   06/01/2007 3 mm/hr     D-Dimer (mg/L FEU)   Date Value   03/07/2022 0.43   03/05/2022 0.50 (H)   03/03/2022 0.50 (H)   03/01/2022 0.41   02/27/2022 0.37   02/25/2022 0.47    02/21/2022 0.50 (H)   02/19/2022 0.84 (H)     Ferritin (ng/mL)   Date Value   03/07/2022 334 (H)   03/05/2022 354 (H)   03/03/2022 300   03/01/2022 274   02/27/2022 312 (H)   02/25/2022 337 (H)   02/21/2022 481 (H)   02/19/2022 564 (H)     LD (U/L)   Date Value   03/07/2022 204   03/05/2022 188   03/03/2022 175   03/01/2022 179   02/27/2022 217   02/25/2022 279 (H)   02/21/2022 294 (H)   02/19/2022 318 (H)     Troponin I (ng/mL)   Date Value   03/05/2022 0.019   02/21/2022 0.470 (H)   02/21/2022 0.654 (H)   02/20/2022 0.704 (H)   02/20/2022 0.390 (H)   02/19/2022 0.037 (H)   01/26/2022 0.820 (H)   01/26/2022 1.042 (H)     CPK (U/L)   Date Value   02/19/2022 95   01/13/2022 271 (H)   10/21/2011 188   01/08/2008 140   06/01/2007 83   05/21/2007 403 (H)   04/30/2007 293 (H)     POC Rapid COVID (no units)   Date Value   02/17/2022 Positive (A)   01/25/2022 Negative   01/12/2022 Negative   01/09/2022 Negative     Microbiology Results (last 7 days)       Procedure Component Value Units Date/Time    Blood culture [[id number]] Collected: 03/05/22 1037    Order Status: Completed Specimen: Blood Updated: 03/08/22 1222     Blood Culture, Routine No Growth to date      No Growth to date      No Growth to date      No Growth to date    Narrative:      Aerobic and anaerobic    Blood culture [[id number]] Collected: 03/05/22 1037    Order Status: Completed Specimen: Blood Updated: 03/08/22 1222     Blood Culture, Routine No Growth to date      No Growth to date      No Growth to date      No Growth to date    Narrative:      Aerobic and anaerobic          ECG Results              EKG 12-lead (Final result)  Result time 02/20/22 09:32:21      Final result by Interface, Lab In [facility] (02/20/22 09:32:21)                   Narrative:    Test Reason :     Vent. Rate : 126 BPM     Atrial Rate : 120 BPM     P-R Int : 000 ms          QRS Dur : 162 ms      QT Int : 412 ms       P-R-T Axes : 000 059 -31 degrees     QTc Int : 596 ms    Wide  QRS tachycardia  Right bundle branch block  T wave abnormality, consider inferior ischemia  Abnormal ECG  When compared with ECG of 19-FEB-2022 19:09,  Wide QRS tachycardia has replaced Junctional rhythm or SVT  Confirmed by Lencho BARROS MD ([id number]) on 2/20/2022 9:32:14 AM    Referred By: GAGAN   SELF           Confirmed By:Lencho BARROS MD                                     Repeat EKG 12-lead (Final result)  Result time 02/20/22 09:34:32      Final result by Interface, Lab In [facility] (02/20/22 09:34:32)                   Narrative:    Test Reason :     Vent. Rate : 118 BPM     Atrial Rate : 117 BPM     P-R Int : 000 ms          QRS Dur : 118 ms      QT Int : 374 ms       P-R-T Axes : 000 056 -21 degrees     QTc Int : 524 ms    Accelerated Junctional rhythm vs SVT with artifact  Nonspecific intraventricular conduction delay  ST and T wave abnormality, consider inferior ischemia  Prolonged QT  Abnormal ECG  When compared with ECG of 27-JAN-2022 00:52,  Rhythm changed  Vent. rate has increased BY  50 BPM  Questionable change in QRS duration  Confirmed by Lencho BARROS MD ([id number]) on 2/20/2022 9:34:22 AM    Referred By: GAGAN   SELF           Confirmed By:Lencho BARROS MD                                  X-Ray Chest AP Portable  Narrative: EXAMINATION:  XR CHEST AP PORTABLE    CLINICAL HISTORY:  hypotension;    TECHNIQUE:  Single frontal view of the chest was performed.    COMPARISON:  02/25/2022    FINDINGS:  Bilateral mid and lower lung zone airspace disease appears slightly improved when compared to 02/25/2022.  No right pleural effusion.  No large left pleural effusion, noting the left costophrenic angle is not seen.  Heart size is unchanged.  Impression: Bilateral predominantly mid and lower lung zone airspace disease, improved when compared to 02/25/2022.  This could be secondary to pulmonary edema, infection or aspiration.    Electronically signed by: Diana Dubon  Date:    03/05/2022  Time:    12:38      Labs and  Imaging within the last 24 hours listed above were reviewed.       Diet: Diet diabetic [facility]; 2000 Calorie  Significant LDAs:   IV Access Type: Peripheral  Urinary Catheter Indication if present: Patient Does Not Have Urinary Catheter  Other Lines/Tubes/Drains:    HIGH RISK CONDITION(S):   Patient has a condition that poses threat to life and bodily function: Severe Respiratory Distress     Goals of Care:    Previous admission:  2/17/22  Likely prognosis:  Fair  Code Status: DNR  Comfort Only: No  Hospice: No  Goals at discharge: remain at home, with physician follow-up    Discharge Planning   ALLIE: 3/10/2022     Code Status: DNR   Is the patient medically ready for discharge?: Yes    Reason for patient still in hospital (select all that apply): Pending disposition  Discharge Plan A: Rehab   Discharge Delays: None known at this time      Assessment/Plan:      * Hypotension  Patient's hypotension on 3/5 resolved after fluids. He had labs consistent with pre-renal azotemia causing a recurrent BRENDAN / renal insufficiency as well that responded after fluids. Hypovolemia perhaps secondary to glucosuric diuresis due to uncontrolled hyperglycemia.   - BCx x2 ordered, NGTD. CXR slightly improved from before when patient admitted for COVID. UA not collected but patient denying any dysuria.   - Discontinued empiric antibiotics.     Discharge planning issues  PT/OT recommend rehab.  Patient's wife will be going to [facility] and family wish for them to stay together.  COVID isolation complete on 3/9.    Pressure injury of buttock, unstageable  Seen by wound care with [facility] started  Continues to cause patient discomfort.    Severe sepsis  Upon admission:  Organ dysfunction indicated by Acute kidney injury, Encephalopathy  and Acute respiratory failure  Source- Unclear. CXR with diffuse interstitial opacities, however no consolidation noted. UA with pyuria, without elevated leuks. Blood cultures negative. Possibly due to  sacral wound, although doesn't look grossly infected.   Started on broad spectrum abx at admission with vanc/Zosyn/doxy.     Palliative care status  Per MICU: patient wishes to be DNR/DNI and family agrees. Still want to continue full medical care     COVID-19 virus infection  Viral Pneumonia due to COVID-19  COVID vaccinated with booster.   Received 1 dose of sotrovimab infusion 02/18/2022  - Diagnostics: Trend LDH, CRP, Ferritin, D-dimer Q48hrs  - Monitoring:          - Telemetry & Continuous Pulse Oximetry  - Completed 5 days of remdesivir, had dexamethasone held initially due to DKA. Currently asymptomatic from COVID-19.   - Stable on room air    - Nutrition:           - Multivitamin PO daily          - Add Boost supplement          - Vitamin D 1000IU daily if deficient          - Ascorbic acid 500mg PO bid    - Supportive Care:          - acetaminophen 650mg PO Q6hr PRN fever/headache          - loperamide PRN viral diarrhea    End isolation on 3/9/2022    Diabetic ketoacidosis with coma associated with type 2 diabetes mellitus  RESOLVED  Patient with uncontrolled DM presenting with metabolic encephalopathy in the setting of DKA with coma. Suspect patient developed DKA in the setting of sepsis/ COVID-19   Glucose 1109, pH 7.17, Co2 15.6 HCO3 <5, AG unable to calculate  DKA protocol completed and DKA resolved in MICU and Pt stepped down on subq insulin.  Hypoglycemia episode upon step-down, detemir adjusted from BID to qhs per home long acting insulin and due to episode of hypoglycemia   Continue aspart TIDWM  Diabetic diet  POCT BGx4  Cotinue to titrate short and long acting insulin needs as indicated to hospital goal 140-180  Endocrinology continues to adjust insulin doses.    Focal seizures  Continue home lancosamide     Encephalopathy, metabolic  In the setting of DKA and sepsis upon admission.  Admit CT Head without any concerning new findings- no new infarct as discussed with Radiology  Back to  baseline.    BRENDAN (acute kidney injury)  sCr maxed at 3.1, baseline 1.1-1.3. Likely prerenal in the setting of DKA. Improved with treatment of DKA.   Had worsening renal function when hypotensive from hypovolemia 3/5 that also improved with fluids  - Avoid nephrotoxins, renally dose meds    Paroxysmal atrial fibrillation  History of paroxysmal atrial fibrillation on home  Metoprolol XL 50 mg daily, diltiazem  mg daily and amiodarone 200 mg daily.  - Continue po apixaban  - Continue amiodarone  - Holding metoprolol due to hypotension/bradycardia    Embolic stroke involving right middle cerebral artery  Hx of CVA in Jan 2022. CT Head with evolving infarcts in right frontal and left cerebellar hemispheres.   Has had issues with memory per family since CVA   No concern for acute stroke this admit per discussion with Radiology.   - Continue home antiplatelets, statin and Eliquis    Coronary artery disease involving native coronary artery of native heart with unstable angina pectoris  Hx of CAD s/p placement of 2 LAUREN in RCA in 01/09/2022.   - Continue home ASA, Plavix and statin  - Chest pain 3/4 after breakfast; EKG nonischemic. PPI, Tums, carafate added    Pulmonary nodules  Has stable bilateral pulmonary nodules/masses with broad differential per outpatient pulmonology notes. No pathology report as none of the nodules appeared to be safe for biopsy given high risk of PTX is high with many adjacent bulla.Plan working on promoting functional independence for the time being with possible addressing of nodules in the distant future, which is highly dependent upon his overall functionality. Family understands that ultimately it may be more beneficial to leave nodules alone as well.     Chronic pulmonary heart disease  Follows up with Pulmonary clinic in [place]. Last seen in December and was evaluated for pulmonary nodules. Deemed to have mild COPD per notes. Not on any maintenance therapy.  - Continue albuterol inhaler      Type 2 diabetes mellitus with hyperglycemia, with long-term current use of insulin  See DKA  Glucoses remain erratic; Endocrine consulted and managing  - Levemir 8u qhs; changed to 6 units on 3/7  - Aspart 4-8u ac/hs pending BG values    Hypertension associated with diabetes  Holding home meds due to normotension/hypotension  - was on losartan 25mg, Toprol xl 50mg, diltiazem 120mg at home  - Will restart losartan 25mg first if hypertension remains.   - See pAF        Active Hospital Problems    Diagnosis  POA    *Hypotension [I95.9]  Yes    Discharge planning issues [Z02.9]  Not Applicable    Pressure injury of buttock, unstageable [L89.300]  Yes    Palliative care status [Z51.5]  Not Applicable    Severe sepsis [A41.9, R65.20]  Yes    COVID-19 virus infection [U07.1]  Yes    Diabetic ketoacidosis with coma associated with type 2 diabetes mellitus [E11.11]  Yes     Chronic    Focal seizures [R56.9]  Yes     Chronic    Encephalopathy, metabolic [G93.41]  Yes    Paroxysmal atrial fibrillation [I48.0]  Yes     Chronic    BRENDAN (acute kidney injury) [N17.9]  Yes    Embolic stroke involving right middle cerebral artery [I63.411]  Yes     Chronic    Coronary artery disease involving native coronary artery of native heart with unstable angina pectoris [I25.110]  Yes     Chronic    Pulmonary nodules [R91.8]  Yes    Chronic pulmonary heart disease [I27.9]  Yes    Type 2 diabetes mellitus with hyperglycemia, with long-term current use of insulin [E11.65, Z79.4]  Not Applicable     Chronic    Hypertension associated with diabetes [E11.59, I15.2]  Yes     Chronic      Resolved Hospital Problems    Diagnosis Date Resolved POA    Diabetic acidosis [E11.10] 03/05/2022 Yes    Acute hypoxemic respiratory failure [J96.01] 03/05/2022 Yes       Inpatient Medications Prescribed for Management of Current Problems:     Scheduled Meds:    acetaminophen  650 mg Oral TID    amiodarone  200 mg Oral Daily    apixaban  5  mg Oral BID    ascorbic acid (vitamin C)  500 mg Oral BID    aspirin  81 mg Oral Daily    atorvastatin  40 mg Oral Daily    clopidogreL  75 mg Oral Daily    diclofenac sodium  4 g Topical (Top) QID    gabapentin  200 mg Oral TID    insulin aspart U-100  4 Units Subcutaneous TIDWM    insulin detemir U-100  6 Units Subcutaneous QHS    lacosamide  100 mg Oral Q12H    LIDOcaine  1 patch Transdermal Q24H    magnesium oxide  400 mg Oral BID    multivitamin  1 tablet Oral Daily    pantoprazole  40 mg Oral BID    senna-docusate 8.6-50 mg  1 tablet Oral BID    sucralfate  1 g Oral QID (AC & HS)     Continuous Infusions:   As Needed: acetaminophen, albuterol **AND** MDI Q4H, calcium carbonate, dextrose 10%, dextrose 10%, glucagon (human recombinant), glucose, glucose, insulin aspart U-100, loperamide, melatonin, ondansetron, oxyCODONE, sodium chloride 0.9%    VTE Risk Mitigation (From admission, onward)         Ordered     apixaban tablet 5 mg  2 times daily         02/19/22 2202     IP VTE HIGH RISK PATIENT  Once         02/19/22 2144     Place sequential compression device  Until discontinued         02/19/22 2144              I have assessed these finding virtually using telemed platform and with assistance of bedside nurse     The attending portion of this evaluation, treatment, and documentation was performed per Tiffany Awad MD via Telemedicine AudioVisual using the secure [employer] software platform with 2 way audio/video. The provider was located off-site and the patient is located in the hospital. The aforementioned video software was utilized to document the relevant history and physical exam. Secure [employer] software platform was used instead of [employer] for this visit.      Tiffany Awad MD  Department of Hospital Medicine   [facility] - Intensive Care ([place]-[facility])

## 2022-03-08 NOTE — SUBJECTIVE & OBJECTIVE
Telemedicine  This service was provided by Virtual Visit.    Patient was transferred to [facility] on:  3/8/2022    Chief Complaint   Patient presents with    Vomiting     Vomiting and altered mental status all day. COVID+     The patient location is: [id number] A   Admitted 2/19/2022  6:57 PM  Present with the patient at the time of the telemed/virtual assessment: N/A    Interval History / Events Overnight:   The patient is able to provide adequate history. Additional history was obtained from past medical records. No significant events reported by Nursing.   Last charted BM 3/3. Continues to complain of wound pain. Still on O2. Hypoglycemic overnight.  Patient complains of fatigue. Symptoms have improved since yesterday. Associated symptoms include: malaise. Symptoms are decreasing in severity.     Lab test(s) reviewed: H&H stable; hypoglycemia    Review of Systems   Constitutional:  Negative for fever.   Skin:  Positive for wound.     Objective:     Vital Signs (Most Recent):  Temp: 97.7 °F (36.5 °C) (03/08/22 1058)  Pulse: 76 (03/08/22 1215)  Resp: 20 (03/08/22 1058)  BP: (!) 146/72 (03/08/22 1100)  SpO2: 95 % (03/08/22 1215) Vital Signs (24h Range):  Temp:  [97.5 °F (36.4 °C)-98.5 °F (36.9 °C)] 97.7 °F (36.5 °C)  Pulse:  [69-81] 76  Resp:  [18-20] 20  SpO2:  [92 %-99 %] 95 %  BP: (113-162)/(58-99) 146/72     Weight: 76.4 kg (168 lb 6.9 oz)  Body mass index is 21.63 kg/m².    Intake/Output Summary (Last 24 hours) at 3/8/2022 1328  Last data filed at 3/8/2022 0800  Gross per 24 hour   Intake 0 ml   Output 820 ml   Net -820 ml        Physical Exam  Constitutional:       General: He is not in acute distress.     Appearance: Normal appearance.   Eyes:      General: Lids are normal. No scleral icterus.        Right eye: No discharge.         Left eye: No discharge.      Conjunctiva/sclera: Conjunctivae normal.   Neck:      Trachea: Phonation normal.   Cardiovascular:      Rate and Rhythm: Normal rate.       Comments: Monitor / Vital signs reviewed at time of visit  Pulmonary:      Effort: Pulmonary effort is normal. No tachypnea, accessory muscle usage or respiratory distress.   Abdominal:      General: There is no distension.   Skin:     Coloration: Skin is not cyanotic.   Neurological:      Mental Status: He is alert. He is not disoriented.   Psychiatric:         Attention and Perception: Attention normal.         Mood and Affect: Affect normal.         Behavior: Behavior is cooperative.       Significant Labs:     Recent Labs   Lab 12/29/21  0810 01/26/22  1512   HGBA1C 12.3* 11.5*       Recent Labs   Lab 03/08/22  0814 03/08/22  0947 03/08/22  1100   POCTGLUCOSE 69* 171* 278*       Recent Labs   Lab 03/06/22  0345 03/07/22  0429 03/08/22  0556   WBC 10.23 7.22 7.34   HGB 10.9* 11.1* 11.6*   HCT 33.8* 34.9* 36.2*    226 259       Recent Labs   Lab 03/06/22 0345 03/07/22 0429 03/08/22  0556   GRAN 74.5*  7.6 70.8  5.1 55.4  4.1   LYMPH 14.8*  1.5 16.5*  1.2 29.0  2.1   MONO 6.5  0.7 7.2  0.5 9.7  0.7   EOS 0.3 0.3 0.4       Recent Labs   Lab 03/06/22 0345 03/07/22  0429 03/08/22  0556    137 139   K 4.0 3.8 3.6    102 102   CO2 24 26 29   BUN 20 16 11   CREATININE 0.9 0.9 0.7    180* 51*   CALCIUM 7.9* 7.8* 8.1*   ALBUMIN 1.9* 1.9* 2.0*   MG 1.8 1.5* 1.7   PHOS 2.4* 2.0* 1.9*       Recent Labs   Lab 03/06/22 0345 03/07/22  0429 03/08/22  0556   ALKPHOS 99 101 122   ALT 10 12 16   AST 19 20 27   PROT 5.7* 5.7* 6.2   BILITOT 0.5 0.4 0.5       Procalcitonin (ng/mL)   Date Value   02/19/2022 0.93 (H)     Lactate (Lactic Acid) (mmol/L)   Date Value   03/05/2022 1.0   03/05/2022 1.9   02/20/2022 4.4 (HH)   02/20/2022 4.9 (HH)   02/19/2022 11.5 (HH)     BNP (pg/mL)   Date Value   02/19/2022 481 (H)   01/25/2022 658 (H)   02/01/2019 60   11/27/2018 33   07/02/2018 87     Sed Rate   Date Value   02/19/2022 92 mm/Hr (H)   06/01/2007 3 mm/hr     D-Dimer (mg/L FEU)   Date Value    03/07/2022 0.43   03/05/2022 0.50 (H)   03/03/2022 0.50 (H)   03/01/2022 0.41   02/27/2022 0.37   02/25/2022 0.47   02/21/2022 0.50 (H)   02/19/2022 0.84 (H)     Ferritin (ng/mL)   Date Value   03/07/2022 334 (H)   03/05/2022 354 (H)   03/03/2022 300   03/01/2022 274   02/27/2022 312 (H)   02/25/2022 337 (H)   02/21/2022 481 (H)   02/19/2022 564 (H)     LD (U/L)   Date Value   03/07/2022 204   03/05/2022 188   03/03/2022 175   03/01/2022 179   02/27/2022 217   02/25/2022 279 (H)   02/21/2022 294 (H)   02/19/2022 318 (H)     Troponin I (ng/mL)   Date Value   03/05/2022 0.019   02/21/2022 0.470 (H)   02/21/2022 0.654 (H)   02/20/2022 0.704 (H)   02/20/2022 0.390 (H)   02/19/2022 0.037 (H)   01/26/2022 0.820 (H)   01/26/2022 1.042 (H)     CPK (U/L)   Date Value   02/19/2022 95   01/13/2022 271 (H)   10/21/2011 188   01/08/2008 140   06/01/2007 83   05/21/2007 403 (H)   04/30/2007 293 (H)     POC Rapid COVID (no units)   Date Value   02/17/2022 Positive (A)   01/25/2022 Negative   01/12/2022 Negative   01/09/2022 Negative     Microbiology Results (last 7 days)       Procedure Component Value Units Date/Time    Blood culture [[id number]] Collected: 03/05/22 1037    Order Status: Completed Specimen: Blood Updated: 03/08/22 1222     Blood Culture, Routine No Growth to date      No Growth to date      No Growth to date      No Growth to date    Narrative:      Aerobic and anaerobic    Blood culture [[id number]] Collected: 03/05/22 1037    Order Status: Completed Specimen: Blood Updated: 03/08/22 1222     Blood Culture, Routine No Growth to date      No Growth to date      No Growth to date      No Growth to date    Narrative:      Aerobic and anaerobic          ECG Results              EKG 12-lead (Final result)  Result time 02/20/22 09:32:21      Final result by Interface, Lab In [facility] (02/20/22 09:32:21)                   Narrative:    Test Reason :     Vent. Rate : 126 BPM     Atrial Rate : 120 BPM     P-R Int : 000 ms           QRS Dur : 162 ms      QT Int : 412 ms       P-R-T Axes : 000 059 -31 degrees     QTc Int : 596 ms    Wide QRS tachycardia  Right bundle branch block  T wave abnormality, consider inferior ischemia  Abnormal ECG  When compared with ECG of 19-FEB-2022 19:09,  Wide QRS tachycardia has replaced Junctional rhythm or SVT  Confirmed by Lencho BARROS MD ([id number]) on 2/20/2022 9:32:14 AM    Referred By: GAGAN   SELF           Confirmed By:Lencho BARROS MD                                     Repeat EKG 12-lead (Final result)  Result time 02/20/22 09:34:32      Final result by Interface, Lab In [facility] (02/20/22 09:34:32)                   Narrative:    Test Reason :     Vent. Rate : 118 BPM     Atrial Rate : 117 BPM     P-R Int : 000 ms          QRS Dur : 118 ms      QT Int : 374 ms       P-R-T Axes : 000 056 -21 degrees     QTc Int : 524 ms    Accelerated Junctional rhythm vs SVT with artifact  Nonspecific intraventricular conduction delay  ST and T wave abnormality, consider inferior ischemia  Prolonged QT  Abnormal ECG  When compared with ECG of 27-JAN-2022 00:52,  Rhythm changed  Vent. rate has increased BY  50 BPM  Questionable change in QRS duration  Confirmed by Lencho BARROS MD ([id number]) on 2/20/2022 9:34:22 AM    Referred By: GAGAN   SELF           Confirmed By:Lencho BARROS MD                                  X-Ray Chest AP Portable  Narrative: EXAMINATION:  XR CHEST AP PORTABLE    CLINICAL HISTORY:  hypotension;    TECHNIQUE:  Single frontal view of the chest was performed.    COMPARISON:  02/25/2022    FINDINGS:  Bilateral mid and lower lung zone airspace disease appears slightly improved when compared to 02/25/2022.  No right pleural effusion.  No large left pleural effusion, noting the left costophrenic angle is not seen.  Heart size is unchanged.  Impression: Bilateral predominantly mid and lower lung zone airspace disease, improved when compared to 02/25/2022.  This could be secondary to pulmonary edema,  infection or aspiration.    Electronically signed by: Diana Dubon  Date:    03/05/2022  Time:    12:38      Labs and Imaging within the last 24 hours listed above were reviewed.       Diet: Diet diabetic [facility]; 2000 Calorie  Significant LDAs:   IV Access Type: Peripheral  Urinary Catheter Indication if present: Patient Does Not Have Urinary Catheter  Other Lines/Tubes/Drains:    HIGH RISK CONDITION(S):   Patient has a condition that poses threat to life and bodily function: Severe Respiratory Distress     Goals of Care:    Previous admission:  2/17/22  Likely prognosis:  Fair  Code Status: DNR  Comfort Only: No  Hospice: No  Goals at discharge: remain at home, with physician follow-up    Discharge Planning   ALLIE: 3/10/2022     Code Status: DNR   Is the patient medically ready for discharge?: Yes    Reason for patient still in hospital (select all that apply): Pending disposition  Discharge Plan A: Rehab   Discharge Delays: None known at this time

## 2022-03-08 NOTE — ASSESSMENT & PLAN NOTE
Upon admission:  Organ dysfunction indicated by Acute kidney injury, Encephalopathy  and Acute respiratory failure  Source- Unclear. CXR with diffuse interstitial opacities, however no consolidation noted. UA with pyuria, without elevated leuks. Blood cultures negative. Possibly due to sacral wound, although doesn't look grossly infected.   Started on broad spectrum abx at admission with vanc/Zosyn/doxy.

## 2022-03-08 NOTE — ASSESSMENT & PLAN NOTE
Viral Pneumonia due to COVID-19  COVID vaccinated with booster.   Received 1 dose of sotrovimab infusion 02/18/2022  - Diagnostics: Trend LDH, CRP, Ferritin, D-dimer Q48hrs  - Monitoring:          - Telemetry & Continuous Pulse Oximetry  - Completed 5 days of remdesivir, had dexamethasone held initially due to DKA. Currently asymptomatic from COVID-19.   - Stable on room air    - Nutrition:           - Multivitamin PO daily          - Add Boost supplement          - Vitamin D 1000IU daily if deficient          - Ascorbic acid 500mg PO bid    - Supportive Care:          - acetaminophen 650mg PO Q6hr PRN fever/headache          - loperamide PRN viral diarrhea    End isolation on 3/9/2022

## 2022-03-08 NOTE — ASSESSMENT & PLAN NOTE
Hx of CVA in Jan 2022. CT Head with evolving infarcts in right frontal and left cerebellar hemispheres.   Has had issues with memory per family since CVA   No concern for acute stroke this admit per discussion with Radiology.   - Continue home antiplatelets, statin and Eliquis

## 2022-03-08 NOTE — PROGRESS NOTES
[facility] ([place] [id number])  Endocrinology  Progress Note    Admit Date: 2/19/2022     Reason for Consult: Management of T2DM, Hyperglycemia     Diabetes diagnosis year: 2010    Home Diabetes Medications:  Novolog 9 TIDWM + SSI; Levemir 20 units qd; Metformin 1000 mg BID    How often checking glucose at home? 1-3 x day   BG readings on regimen: 100-150's  Hypoglycemia on the regimen?  No  Missed doses on regimen?  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 History was not obtained from patient due to encephalopathy. Patient's daughter at bedside reports the patient was recently in the ED on 2/17 for a mechanical fall after being discharged from rehab the same day. CTH and cervical spine revealed  evolving late subacute infarct involving the right frontal lobe with questionable petechial hemorrhage. Vascular neurology evaluated the patient and cleared him for discharge from without any new interventions. He was also tested positive for COVID-19 and was discharged yesterday from the ED. He subsequently received one dose of sotrovimab infusion for COVID and was in a normal state of health until this morning when his daughter found him lethargic and barely responsive prompting her to bring him to the ED. She reports the patient had no complaints prior to developing his AMS. Of note, the patient was recently admitted to [facility] from 01/25 through 02/13 for AMS concerning for CVA, however he was treated for metabolic encephalopathy 2/2 to DKA/HHS, sepsis and BRENDAN. Upon arrival to the ED, he was placed on 6L NC,  "normotensive and tachycardic. Lactic 11.5, WBC 17.8, Glucose 1109, pH 7.17, Co2 15.6 HCO3 <5, AG unable to calculate. sCr 3.1. CXR with intersitial opacities. He received ~4L of IVF, vancomycin, zosyn, initiated on insulin shifted for hyperkalemia and calcium for cardioprotection. ECG without noticeable ischemic changes. CTH without new changes- discussed findings with radiology. Patient was admitted to the MICU for further management. Endocrine consulted to manage type 2 diabetes and hyperglycemia. Since admission patient has had fluctuations in BG control.             Interval HPI:   Overnight events:    Pt with low BG in AM due to insulin stacking last night    In addition noted to have elevated BG at 11AM due to pt not receiving pre-meal insulin for his breakfast    Eatin%  Nausea: No  Hypoglycemia and intervention: No  Fever: No  TPN and/or TF: No  If yes, type of TF/TPN and rate: NA    BP (!) 162/79 (BP Location: Right arm, Patient Position: Lying)   Pulse 72   Temp 97.7 °F (36.5 °C) (Oral)   Resp 20   Ht 6' 2" (1.88 m)   Wt 76.4 kg (168 lb 6.9 oz)   SpO2 97%   BMI 21.63 kg/m²     Labs Reviewed and Include    Recent Labs   Lab 22  0556   GLU 51*   CALCIUM 8.1*   ALBUMIN 2.0*   PROT 6.2      K 3.6   CO2 29      BUN 11   CREATININE 0.7   ALKPHOS 122   ALT 16   AST 27   BILITOT 0.5     Lab Results   Component Value Date    WBC 7.34 2022    HGB 11.6 (L) 2022    HCT 36.2 (L) 2022    MCV 86 2022     2022     No results for input(s): TSH, FREET4 in the last 168 hours.  Lab Results   Component Value Date    HGBA1C 11.5 (H) 2022       Nutritional status:   Body mass index is 21.63 kg/m².  Lab Results   Component Value Date    ALBUMIN 2.0 (L) 2022    ALBUMIN 1.9 (L) 2022    ALBUMIN 1.9 (L) 2022     No results found for: PREALBUMIN    Estimated Creatinine Clearance: 94 mL/min (based on SCr of 0.7 mg/dL).    Accu-Checks  Recent " Labs     03/06/22  1959 03/07/22  0743 03/07/22  1154 03/07/22  1635 03/07/22  1854 03/07/22  1952 03/08/22  0742 03/08/22  0814 03/08/22  0947 03/08/22  1100   POCTGLUCOSE 299* 119* 196* 351* 380* 372* 45* 69* 171* 278*       Current Medications and/or Treatments Impacting Glycemic Control  Immunotherapy:    Immunosuppressants       None          Steroids:   Hormones (From admission, onward)                Start     Stop Route Frequency Ordered    02/23/22 1824  melatonin tablet 6 mg         -- Oral Nightly PRN 02/23/22 1724          Pressors:    Autonomic Drugs (From admission, onward)                None          Hyperglycemia/Diabetes Medications:   Antihyperglycemics (From admission, onward)                Start     Stop Route Frequency Ordered    03/06/22 2100  insulin detemir U-100 pen 8 Units         -- SubQ Nightly 03/06/22 0854    03/06/22 1130  insulin aspart U-100 pen 4-8 Units         -- SubQ 3 times daily with meals 03/06/22 1043    03/05/22 1145  insulin aspart U-100 pen 0-5 Units         -- SubQ Before meals, nightly and at 0100 PRN 03/05/22 1034            ASSESSMENT and PLAN    COVID-19 virus infection  Infection may elevate BG readings  Managed per primary team          Diabetic ketoacidosis with coma associated with type 2 diabetes mellitus  Now resolved.     Will continue to monitor in the event of BG excursions.       Encephalopathy, metabolic    Managed per primary team  Avoid hypoglycemia      Type 2 diabetes mellitus with hyperglycemia, with long-term current use of insulin  Endocrinology consulted for BG management.   BG goal 140-180    - Decrease Levemir Flex Pen to 6 units nightly    - Novolog (aspart) insulin 4 Units SQ TIDWM with low dose correction insulin  - BG checks AC/HS/0200      ** Please notify Endocrine for any change and/or advance in diet**      Discharge Planning:   TBD. Please notify endocrinology prior to discharge.            Rajeev Sanderson MD  Endocrinology  [facility] -  Intensive Care ([place] [id number])

## 2022-03-08 NOTE — ASSESSMENT & PLAN NOTE
Has stable bilateral pulmonary nodules/masses with broad differential per outpatient pulmonology notes. No pathology report as none of the nodules appeared to be safe for biopsy given high risk of PTX is high with many adjacent bulla.Plan working on promoting functional independence for the time being with possible addressing of nodules in the distant future, which is highly dependent upon his overall functionality. Family understands that ultimately it may be more beneficial to leave nodules alone as well.

## 2022-03-08 NOTE — ASSESSMENT & PLAN NOTE
See DKA  Glucoses remain erratic; Endocrine consulted and managing  - Levemir 8u qhs; changed to 6 units on 3/7  - Aspart 4-8u ac/hs pending BG values

## 2022-03-08 NOTE — PLAN OF CARE
JASIEL spoke with the patient's son Ed [phone] to discuss the patient's d/c plans. Per Ed, the patient will go to [facility] on tomorrow, he just needs to make a phone call.     JASIEL spoke with Cyndee at [facility], to provide an up date and to inquire if there are any beds available. Per Cyndee, the patient has a few people ahead of him, however, the patient is appropriate fr [facility].Per Cyndee, she will follow up with JASIEL later today.     JASIEL received a call from Ed, who reported the patient is at the top of the list for [facility] to admit tomorrow. JASIEL will continue to follow.     Mraley Gregg LMSW   - [facility]  Ext. [phone]

## 2022-03-08 NOTE — ASSESSMENT & PLAN NOTE
RESOLVED  Patient with uncontrolled DM presenting with metabolic encephalopathy in the setting of DKA with coma. Suspect patient developed DKA in the setting of sepsis/ COVID-19   Glucose 1109, pH 7.17, Co2 15.6 HCO3 <5, AG unable to calculate  DKA protocol completed and DKA resolved in MICU and Pt stepped down on subq insulin.  Hypoglycemia episode upon step-down, detemir adjusted from BID to qhs per home long acting insulin and due to episode of hypoglycemia   Continue aspart TIDWM  Diabetic diet  POCT BGx4  Cotinue to titrate short and long acting insulin needs as indicated to hospital goal 140-180  Endocrinology continues to adjust insulin doses.

## 2022-03-08 NOTE — ASSESSMENT & PLAN NOTE
In the setting of DKA and sepsis upon admission.  Admit CT Head without any concerning new findings- no new infarct as discussed with Radiology  Back to baseline.

## 2022-03-08 NOTE — ASSESSMENT & PLAN NOTE
Patient's hypotension on 3/5 resolved after fluids. He had labs consistent with pre-renal azotemia causing a recurrent BRENDAN / renal insufficiency as well that responded after fluids. Hypovolemia perhaps secondary to glucosuric diuresis due to uncontrolled hyperglycemia.   - BCx x2 ordered, NGTD. CXR slightly improved from before when patient admitted for COVID. UA not collected but patient denying any dysuria.   - Discontinued empiric antibiotics.

## 2022-03-08 NOTE — PLAN OF CARE
Problem: Physical Therapy Goal  Goal: Physical Therapy Goal  Description: Goals to be met by: 3/10/22     Patient will increase functional independence with mobility by performin. Supine to sit with MInimal Assistance - met   1a. Supine to sit with SBA = met 3/8/2022  2. Sit to supine with MInimal Assistance - met   2a. Sit to supine with SBA   3. Sit to stand transfer with Minimal Assistance - met   3a. Sit to stand transfer SBA  4. Gait  x 25 feet with Minimal Assistance using LRAD, if needed. - met   4a. Gait x150 ft with SBA using RW or LRAD as needed  5. Sitting at edge of bed x10 minutes with Modified [place]  6. Stand for 3 minutes with Minimal Assistance using LRAD, if needed  7. Lower extremity exercise program x10 reps per handout, with independence    Outcome: Ongoing, Progressing

## 2022-03-08 NOTE — PT/OT/SLP PROGRESS
Physical Therapy Treatment    Patient Name:  Kamar Muñoz   MRN:  [id number]    Recommendations:     Discharge Recommendations:  nursing facility, skilled   Discharge Equipment Recommendations: none   Barriers to discharge: requires increased assistance    Assessment:     Kamar Muñoz is a 76 y.o. male admitted with a medical diagnosis of Hypotension.  He presents with the following impairments/functional limitations:  weakness, impaired endurance, impaired functional mobilty, gait instability, impaired balance . Patient participates w/ encouragement. Supine to sit w/ SBA; stands from EOB w/ RW and CGA; ambulates 6 feet to bedside chair w/ RW and CGA. Patient remained up in chair eating his lunch w/ nursing notified. .    Rehab Prognosis: Good; patient would benefit from acute skilled PT services to address these deficits and reach maximum level of function.    Recent Surgery: * No surgery found *      Plan:     During this hospitalization, patient to be seen 4 x/week to address the identified rehab impairments via gait training, therapeutic activities, therapeutic exercises and progress toward the following goals:    · Plan of Care Expires:  03/22/22    Subjective     Chief Complaint: why didn't you come this morning?.   Patient/Family Comments/goals: agreeable to OOB w/ encouragment. Patient then decided to sit up in chair, after saying he was not going to.   Pain/Comfort:  · Pain Rating 1: 0/10  · Pain Rating Post-Intervention 1: 0/10      Objective:     Communicated with nurse prior to session.  Patient found supine with oxygen, pulse ox (continuous), telemetry upon PT entry to room.     General Precautions: Standard, fall   Orthopedic Precautions:N/A   Braces: N/A  Respiratory Status: Nasal cannula, flow 2 L/min     Functional Mobility:  · Bed Mobility:     · Supine to Sit: stand by assistance  · Transfers:     · Sit to Stand:  contact guard assistance with rolling walker and from EOB  · Gait: ambulates  w/ RW and CGA 6 feet to bedside chair. Patient declined additional gait d/t c/o fatigue and wanting to eat lunch.      AM-PAC 6 CLICK MOBILITY  Turning over in bed (including adjusting bedclothes, sheets and blankets)?: 4  Sitting down on and standing up from a chair with arms (e.g., wheelchair, bedside commode, etc.): 3  Moving from lying on back to sitting on the side of the bed?: 4  Moving to and from a bed to a chair (including a wheelchair)?: 3  Need to walk in hospital room?: 3  Climbing 3-5 steps with a railing?: 2  Basic Mobility Total Score: 19       Therapeutic Activities and Exercises:   patient educated on PT POC, gait and trf tech, call for assistance when ready to get back to bed  Nurse okayed for patient to eat lunch and lunch sat up for patient.    Patient left up in chair with all lines intact, call button in reach and nurse notified..    GOALS:   Multidisciplinary Problems     Physical Therapy Goals        Problem: Physical Therapy Goal    Goal Priority Disciplines Outcome Goal Variances Interventions   Physical Therapy Goal     PT, PT/OT Ongoing, Progressing     Description: Goals to be met by: 3/10/22     Patient will increase functional independence with mobility by performin. Supine to sit with MInimal Assistance - met   1a. Supine to sit with SBA = met 3/8/2022  2. Sit to supine with MInimal Assistance - met   2a. Sit to supine with SBA   3. Sit to stand transfer with Minimal Assistance - met   3a. Sit to stand transfer SBA  4. Gait  x 25 feet with Minimal Assistance using LRAD, if needed. - met   4a. Gait x150 ft with SBA using RW or LRAD as needed  5. Sitting at edge of bed x10 minutes with Modified [place]  6. Stand for 3 minutes with Minimal Assistance using LRAD, if needed  7. Lower extremity exercise program x10 reps per handout, with independence                     Time Tracking:     PT Received On: 22  PT Start Time: 1406     PT Stop Time: 1426  PT  Total Time (min): 20 min     Billable Minutes: Gait Training 15    Treatment Type: Treatment  PT/PTA: PT     PTA Visit Number: 0     03/08/2022

## 2022-03-08 NOTE — ASSESSMENT & PLAN NOTE
Endocrinology consulted for BG management.   BG goal 140-180    - Decrease Levemir Flex Pen to 6 units nightly    - Novolog (aspart) insulin 4-8 Units SQ TIDWM with low dose correction insulin  - BG checks AC/HS/0200      ** Please notify Endocrine for any change and/or advance in diet**      Discharge Planning:   TBD. Please notify endocrinology prior to discharge.

## 2022-03-09 LAB
ALBUMIN SERPL BCP-MCNC: 1.9 G/DL (ref 3.5–5.2)
ANION GAP SERPL CALC-SCNC: 11 MMOL/L (ref 8–16)
BASOPHILS # BLD AUTO: 0.06 K/UL (ref 0–0.2)
BASOPHILS NFR BLD: 1 % (ref 0–1.9)
BNP SERPL-MCNC: 568 PG/ML (ref 0–99)
BUN SERPL-MCNC: 15 MG/DL (ref 8–23)
CALCIUM SERPL-MCNC: 7.8 MG/DL (ref 8.7–10.5)
CHLORIDE SERPL-SCNC: 99 MMOL/L (ref 95–110)
CO2 SERPL-SCNC: 24 MMOL/L (ref 23–29)
CREAT SERPL-MCNC: 1 MG/DL (ref 0.5–1.4)
DIFFERENTIAL METHOD: ABNORMAL
EOSINOPHIL # BLD AUTO: 0.2 K/UL (ref 0–0.5)
EOSINOPHIL NFR BLD: 3.3 % (ref 0–8)
ERYTHROCYTE [DISTWIDTH] IN BLOOD BY AUTOMATED COUNT: 16.6 % (ref 11.5–14.5)
EST. GFR  (AFRICAN AMERICAN): >60 ML/MIN/1.73 M^2
EST. GFR  (NON AFRICAN AMERICAN): >60 ML/MIN/1.73 M^2
GLUCOSE SERPL-MCNC: 234 MG/DL (ref 70–110)
HCT VFR BLD AUTO: 33.8 % (ref 40–54)
HGB BLD-MCNC: 10.8 G/DL (ref 14–18)
IMM GRANULOCYTES # BLD AUTO: 0.01 K/UL (ref 0–0.04)
IMM GRANULOCYTES NFR BLD AUTO: 0.2 % (ref 0–0.5)
LYMPHOCYTES # BLD AUTO: 1.4 K/UL (ref 1–4.8)
LYMPHOCYTES NFR BLD: 23.7 % (ref 18–48)
MAGNESIUM SERPL-MCNC: 1.7 MG/DL (ref 1.6–2.6)
MCH RBC QN AUTO: 28.5 PG (ref 27–31)
MCHC RBC AUTO-ENTMCNC: 32 G/DL (ref 32–36)
MCV RBC AUTO: 89 FL (ref 82–98)
MONOCYTES # BLD AUTO: 0.6 K/UL (ref 0.3–1)
MONOCYTES NFR BLD: 10.5 % (ref 4–15)
NEUTROPHILS # BLD AUTO: 3.5 K/UL (ref 1.8–7.7)
NEUTROPHILS NFR BLD: 61.3 % (ref 38–73)
NRBC BLD-RTO: 0 /100 WBC
PHOSPHATE SERPL-MCNC: 2.1 MG/DL (ref 2.7–4.5)
PLATELET # BLD AUTO: 216 K/UL (ref 150–450)
PMV BLD AUTO: 10 FL (ref 9.2–12.9)
POCT GLUCOSE: 206 MG/DL (ref 70–110)
POCT GLUCOSE: 315 MG/DL (ref 70–110)
POCT GLUCOSE: 402 MG/DL (ref 70–110)
POCT GLUCOSE: 423 MG/DL (ref 70–110)
POCT GLUCOSE: 441 MG/DL (ref 70–110)
POTASSIUM SERPL-SCNC: 4.7 MMOL/L (ref 3.5–5.1)
RBC # BLD AUTO: 3.79 M/UL (ref 4.6–6.2)
SODIUM SERPL-SCNC: 134 MMOL/L (ref 136–145)
WBC # BLD AUTO: 5.74 K/UL (ref 3.9–12.7)

## 2022-03-09 PROCEDURE — 27000207 HC ISOLATION

## 2022-03-09 PROCEDURE — 12000002 HC ACUTE/MED SURGE SEMI-PRIVATE ROOM

## 2022-03-09 PROCEDURE — 27000221 HC OXYGEN, UP TO 24 HOURS

## 2022-03-09 PROCEDURE — 99232 PR SUBSEQUENT HOSPITAL CARE,LEVL II: ICD-10-PCS | Mod: GC,,, | Performed by: INTERNAL MEDICINE

## 2022-03-09 PROCEDURE — 80069 RENAL FUNCTION PANEL: CPT | Performed by: INTERNAL MEDICINE

## 2022-03-09 PROCEDURE — 83880 ASSAY OF NATRIURETIC PEPTIDE: CPT | Performed by: INTERNAL MEDICINE

## 2022-03-09 PROCEDURE — 99233 PR SUBSEQUENT HOSPITAL CARE,LEVL III: ICD-10-PCS | Mod: 95,,, | Performed by: INTERNAL MEDICINE

## 2022-03-09 PROCEDURE — 94761 N-INVAS EAR/PLS OXIMETRY MLT: CPT

## 2022-03-09 PROCEDURE — 25000003 PHARM REV CODE 250: Performed by: STUDENT IN AN ORGANIZED HEALTH CARE EDUCATION/TRAINING PROGRAM

## 2022-03-09 PROCEDURE — 25000003 PHARM REV CODE 250: Performed by: INTERNAL MEDICINE

## 2022-03-09 PROCEDURE — 99232 SBSQ HOSP IP/OBS MODERATE 35: CPT | Mod: GC,,, | Performed by: INTERNAL MEDICINE

## 2022-03-09 PROCEDURE — 97535 SELF CARE MNGMENT TRAINING: CPT

## 2022-03-09 PROCEDURE — 83735 ASSAY OF MAGNESIUM: CPT | Performed by: STUDENT IN AN ORGANIZED HEALTH CARE EDUCATION/TRAINING PROGRAM

## 2022-03-09 PROCEDURE — 99233 SBSQ HOSP IP/OBS HIGH 50: CPT | Mod: 95,,, | Performed by: INTERNAL MEDICINE

## 2022-03-09 PROCEDURE — 85025 COMPLETE CBC W/AUTO DIFF WBC: CPT | Performed by: STUDENT IN AN ORGANIZED HEALTH CARE EDUCATION/TRAINING PROGRAM

## 2022-03-09 RX ORDER — INSULIN ASPART 100 [IU]/ML
6 INJECTION, SOLUTION INTRAVENOUS; SUBCUTANEOUS
Status: DISCONTINUED | OUTPATIENT
Start: 2022-03-09 | End: 2022-03-10 | Stop reason: HOSPADM

## 2022-03-09 RX ADMIN — Medication 400 MG: at 10:03

## 2022-03-09 RX ADMIN — GABAPENTIN 200 MG: 100 CAPSULE ORAL at 09:03

## 2022-03-09 RX ADMIN — ACETAMINOPHEN 650 MG: 325 TABLET ORAL at 10:03

## 2022-03-09 RX ADMIN — SUCRALFATE 1 G: 1 TABLET ORAL at 10:03

## 2022-03-09 RX ADMIN — ASPIRIN 81 MG: 81 TABLET, COATED ORAL at 10:03

## 2022-03-09 RX ADMIN — GABAPENTIN 200 MG: 100 CAPSULE ORAL at 03:03

## 2022-03-09 RX ADMIN — DICLOFENAC SODIUM 4 G: 10 GEL TOPICAL at 05:03

## 2022-03-09 RX ADMIN — ATORVASTATIN CALCIUM 40 MG: 20 TABLET, FILM COATED ORAL at 10:03

## 2022-03-09 RX ADMIN — INSULIN ASPART 3 UNITS: 100 INJECTION, SOLUTION INTRAVENOUS; SUBCUTANEOUS at 09:03

## 2022-03-09 RX ADMIN — APIXABAN 5 MG: 5 TABLET, FILM COATED ORAL at 09:03

## 2022-03-09 RX ADMIN — SUCRALFATE 1 G: 1 TABLET ORAL at 06:03

## 2022-03-09 RX ADMIN — INSULIN ASPART 4 UNITS: 100 INJECTION, SOLUTION INTRAVENOUS; SUBCUTANEOUS at 01:03

## 2022-03-09 RX ADMIN — DIBASIC SODIUM PHOSPHATE, MONOBASIC POTASSIUM PHOSPHATE AND MONOBASIC SODIUM PHOSPHATE 2 TABLET: 852; 155; 130 TABLET ORAL at 10:03

## 2022-03-09 RX ADMIN — PANTOPRAZOLE SODIUM 40 MG: 40 TABLET, DELAYED RELEASE ORAL at 09:03

## 2022-03-09 RX ADMIN — OXYCODONE HYDROCHLORIDE AND ACETAMINOPHEN 500 MG: 500 TABLET ORAL at 10:03

## 2022-03-09 RX ADMIN — DICLOFENAC SODIUM 4 G: 10 GEL TOPICAL at 10:03

## 2022-03-09 RX ADMIN — DICLOFENAC SODIUM 4 G: 10 GEL TOPICAL at 01:03

## 2022-03-09 RX ADMIN — CLOPIDOGREL 75 MG: 75 TABLET, FILM COATED ORAL at 10:03

## 2022-03-09 RX ADMIN — PANTOPRAZOLE SODIUM 40 MG: 40 TABLET, DELAYED RELEASE ORAL at 10:03

## 2022-03-09 RX ADMIN — LACOSAMIDE 100 MG: 100 TABLET, FILM COATED ORAL at 09:03

## 2022-03-09 RX ADMIN — Medication 400 MG: at 09:03

## 2022-03-09 RX ADMIN — SUCRALFATE 1 G: 1 TABLET ORAL at 09:03

## 2022-03-09 RX ADMIN — GABAPENTIN 200 MG: 100 CAPSULE ORAL at 10:03

## 2022-03-09 RX ADMIN — INSULIN ASPART 6 UNITS: 100 INJECTION, SOLUTION INTRAVENOUS; SUBCUTANEOUS at 05:03

## 2022-03-09 RX ADMIN — DIBASIC SODIUM PHOSPHATE, MONOBASIC POTASSIUM PHOSPHATE AND MONOBASIC SODIUM PHOSPHATE 2 TABLET: 852; 155; 130 TABLET ORAL at 05:03

## 2022-03-09 RX ADMIN — SUCRALFATE 1 G: 1 TABLET ORAL at 05:03

## 2022-03-09 RX ADMIN — INSULIN ASPART 4 UNITS: 100 INJECTION, SOLUTION INTRAVENOUS; SUBCUTANEOUS at 05:03

## 2022-03-09 RX ADMIN — ACETAMINOPHEN 650 MG: 325 TABLET ORAL at 09:03

## 2022-03-09 RX ADMIN — MULTIPLE VITAMINS W/ MINERALS TAB 1 TABLET: TAB at 10:03

## 2022-03-09 RX ADMIN — DICLOFENAC SODIUM 4 G: 10 GEL TOPICAL at 09:03

## 2022-03-09 RX ADMIN — OXYCODONE HYDROCHLORIDE AND ACETAMINOPHEN 500 MG: 500 TABLET ORAL at 09:03

## 2022-03-09 RX ADMIN — LACOSAMIDE 100 MG: 100 TABLET, FILM COATED ORAL at 10:03

## 2022-03-09 RX ADMIN — AMIODARONE HYDROCHLORIDE 200 MG: 200 TABLET ORAL at 10:03

## 2022-03-09 RX ADMIN — APIXABAN 5 MG: 5 TABLET, FILM COATED ORAL at 10:03

## 2022-03-09 RX ADMIN — ACETAMINOPHEN 650 MG: 325 TABLET ORAL at 03:03

## 2022-03-09 NOTE — NURSING
Patient's glucose 431. Notified KRISTIN Harris NP via secure chat. Told to follow current orders and give ordered nightly detemir and follow sliding scale. Monitoring.

## 2022-03-09 NOTE — SUBJECTIVE & OBJECTIVE
"Telemedicine  This service was provided by Virtual Visit.    Patient was transferred to [facility] on:  3/8/2022    Chief Complaint   Patient presents with    Vomiting     Vomiting and altered mental status all day. COVID+     The patient location is: [id number] [id number]   Admitted 2/19/2022  6:57 PM  Present with the patient at the time of the telemed/virtual assessment: N/A    Interval History / Events Overnight:   The patient is able to provide adequate history. Additional history was obtained from past medical records. No significant events reported by Nursing. "Lunch" pre-meal BGs were checked post-prandially today => over 400.  Patient complains of fatigue. Symptoms have improved since yesterday. Associated symptoms include: malaise. Symptoms are decreasing in severity.     Lab test(s) reviewed: H&H stable; hyperglycemia    Review of Systems   Constitutional:  Negative for fever.   Skin:  Positive for wound.     Objective:     Vital Signs (Most Recent):  Temp: 97.5 °F (36.4 °C) (03/09/22 1233)  Pulse: 84 (03/09/22 1233)  Resp: 20 (03/09/22 1233)  BP: 131/63 (03/09/22 1233)  SpO2: (!) 94 % (03/09/22 1030) Vital Signs (24h Range):  Temp:  [97.5 °F (36.4 °C)-98.3 °F (36.8 °C)] 97.5 °F (36.4 °C)  Pulse:  [] 84  Resp:  [18-20] 20  SpO2:  [92 %-96 %] 94 %  BP: (114-169)/(57-81) 131/63     Weight: 76.4 kg (168 lb 6.9 oz)  Body mass index is 21.63 kg/m².  No intake or output data in the 24 hours ending 03/09/22 1247     Physical Exam  Constitutional:       General: He is not in acute distress.     Appearance: Normal appearance.   Eyes:      General: Lids are normal. No scleral icterus.        Right eye: No discharge.         Left eye: No discharge.      Conjunctiva/sclera: Conjunctivae normal.   Neck:      Trachea: Phonation normal.   Cardiovascular:      Rate and Rhythm: Normal rate.      Comments: Monitor / Vital signs reviewed at time of visit  Pulmonary:      Effort: Pulmonary effort is normal. No " tachypnea, accessory muscle usage or respiratory distress.   Abdominal:      General: There is no distension.   Skin:     Coloration: Skin is not cyanotic.   Neurological:      Mental Status: He is alert. He is not disoriented.   Psychiatric:         Attention and Perception: Attention normal.         Mood and Affect: Affect normal.         Behavior: Behavior is cooperative.       Significant Labs:     Recent Labs   Lab 12/29/21  0810 01/26/22  1512   HGBA1C 12.3* 11.5*       Recent Labs   Lab 03/08/22  1945 03/09/22  0805 03/09/22  1236   POCTGLUCOSE 431* 206* 402*       Recent Labs   Lab 03/07/22  0429 03/08/22  0556 03/09/22  0241   WBC 7.22 7.34 5.74   HGB 11.1* 11.6* 10.8*   HCT 34.9* 36.2* 33.8*    259 216       Recent Labs   Lab 03/07/22  0429 03/08/22  0556 03/09/22  0241   GRAN 70.8  5.1 55.4  4.1 61.3  3.5   LYMPH 16.5*  1.2 29.0  2.1 23.7  1.4   MONO 7.2  0.5 9.7  0.7 10.5  0.6   EOS 0.3 0.4 0.2       Recent Labs   Lab 03/07/22 0429 03/08/22  0556 03/09/22  0241    139 134*   K 3.8 3.6 4.7    102 99   CO2 26 29 24   BUN 16 11 15   CREATININE 0.9 0.7 1.0   * 51* 234*   CALCIUM 7.8* 8.1* 7.8*   ALBUMIN 1.9* 2.0* 1.9*   MG 1.5* 1.7 1.7   PHOS 2.0* 1.9* 2.1*       Recent Labs   Lab 03/06/22  0345 03/07/22  0429 03/08/22  0556   ALKPHOS 99 101 122   ALT 10 12 16   AST 19 20 27   PROT 5.7* 5.7* 6.2   BILITOT 0.5 0.4 0.5       Procalcitonin (ng/mL)   Date Value   02/19/2022 0.93 (H)     Lactate (Lactic Acid) (mmol/L)   Date Value   03/05/2022 1.0   03/05/2022 1.9   02/20/2022 4.4 (HH)   02/20/2022 4.9 (HH)   02/19/2022 11.5 (HH)     BNP (pg/mL)   Date Value   03/09/2022 568 (H)   02/19/2022 481 (H)   01/25/2022 658 (H)   02/01/2019 60   11/27/2018 33     Sed Rate   Date Value   02/19/2022 92 mm/Hr (H)   06/01/2007 3 mm/hr     D-Dimer (mg/L FEU)   Date Value   03/07/2022 0.43   03/05/2022 0.50 (H)   03/03/2022 0.50 (H)   03/01/2022 0.41   02/27/2022 0.37   02/25/2022 0.47    02/21/2022 0.50 (H)   02/19/2022 0.84 (H)     Ferritin (ng/mL)   Date Value   03/07/2022 334 (H)   03/05/2022 354 (H)   03/03/2022 300   03/01/2022 274   02/27/2022 312 (H)   02/25/2022 337 (H)   02/21/2022 481 (H)   02/19/2022 564 (H)     LD (U/L)   Date Value   03/07/2022 204   03/05/2022 188   03/03/2022 175   03/01/2022 179   02/27/2022 217   02/25/2022 279 (H)   02/21/2022 294 (H)   02/19/2022 318 (H)     Troponin I (ng/mL)   Date Value   03/05/2022 0.019   02/21/2022 0.470 (H)   02/21/2022 0.654 (H)   02/20/2022 0.704 (H)   02/20/2022 0.390 (H)   02/19/2022 0.037 (H)   01/26/2022 0.820 (H)   01/26/2022 1.042 (H)     CPK (U/L)   Date Value   02/19/2022 95   01/13/2022 271 (H)   10/21/2011 188   01/08/2008 140   06/01/2007 83   05/21/2007 403 (H)   04/30/2007 293 (H)     SARS-CoV2 (COVID-19) Qualitative PCR (no units)   Date Value   03/08/2022 Not Detected     POC Rapid COVID (no units)   Date Value   02/17/2022 Positive (A)   01/25/2022 Negative   01/12/2022 Negative   01/09/2022 Negative     Microbiology Results (last 7 days)       Procedure Component Value Units Date/Time    Blood culture [[id number]] Collected: 03/05/22 1037    Order Status: Completed Specimen: Blood Updated: 03/09/22 1222     Blood Culture, Routine No Growth to date      No Growth to date      No Growth to date      No Growth to date      No Growth to date    Narrative:      Aerobic and anaerobic    Blood culture [[id number]] Collected: 03/05/22 1037    Order Status: Completed Specimen: Blood Updated: 03/09/22 1222     Blood Culture, Routine No Growth to date      No Growth to date      No Growth to date      No Growth to date      No Growth to date    Narrative:      Aerobic and anaerobic          ECG Results              EKG 12-lead (Final result)  Result time 02/20/22 09:32:21      Final result by Interface, Lab In [facility] (02/20/22 09:32:21)                   Narrative:    Test Reason :     Vent. Rate : 126 BPM     Atrial Rate : 120  BPM     P-R Int : 000 ms          QRS Dur : 162 ms      QT Int : 412 ms       P-R-T Axes : 000 059 -31 degrees     QTc Int : 596 ms    Wide QRS tachycardia  Right bundle branch block  T wave abnormality, consider inferior ischemia  Abnormal ECG  When compared with ECG of 19-FEB-2022 19:09,  Wide QRS tachycardia has replaced Junctional rhythm or SVT  Confirmed by Lencho BARROS MD ([id number]) on 2/20/2022 9:32:14 AM    Referred By: GAGAN   SELF           Confirmed By:Lencho BARROS MD                                     Repeat EKG 12-lead (Final result)  Result time 02/20/22 09:34:32      Final result by Interface, Lab In [facility] (02/20/22 09:34:32)                   Narrative:    Test Reason :     Vent. Rate : 118 BPM     Atrial Rate : 117 BPM     P-R Int : 000 ms          QRS Dur : 118 ms      QT Int : 374 ms       P-R-T Axes : 000 056 -21 degrees     QTc Int : 524 ms    Accelerated Junctional rhythm vs SVT with artifact  Nonspecific intraventricular conduction delay  ST and T wave abnormality, consider inferior ischemia  Prolonged QT  Abnormal ECG  When compared with ECG of 27-JAN-2022 00:52,  Rhythm changed  Vent. rate has increased BY  50 BPM  Questionable change in QRS duration  Confirmed by Lencho BARROS MD ([id number]) on 2/20/2022 9:34:22 AM    Referred By: GAGAN   SELF           Confirmed By:Lencho BARROS MD                                  X-Ray Chest AP Portable  Narrative: EXAMINATION:  XR CHEST AP PORTABLE    CLINICAL HISTORY:  hypotension;    TECHNIQUE:  Single frontal view of the chest was performed.    COMPARISON:  02/25/2022    FINDINGS:  Bilateral mid and lower lung zone airspace disease appears slightly improved when compared to 02/25/2022.  No right pleural effusion.  No large left pleural effusion, noting the left costophrenic angle is not seen.  Heart size is unchanged.  Impression: Bilateral predominantly mid and lower lung zone airspace disease, improved when compared to 02/25/2022.  This could be secondary  to pulmonary edema, infection or aspiration.    Electronically signed by: Diana Dubon  Date:    03/05/2022  Time:    12:38      Labs and Imaging within the last 24 hours listed above were reviewed.       Diet: Diet diabetic [facility]; 2000 Calorie  Significant LDAs:   IV Access Type: Peripheral  Urinary Catheter Indication if present: Patient Does Not Have Urinary Catheter  Other Lines/Tubes/Drains:    HIGH RISK CONDITION(S):   Patient has a condition that poses threat to life and bodily function: Severe Respiratory Distress     Goals of Care:    Previous admission:  2/17/22  Likely prognosis:  Fair  Code Status: DNR  Comfort Only: No  Hospice: No  Goals at discharge: remain at home, with physician follow-up    Discharge Planning   ALLIE: 3/10/2022     Code Status: DNR   Is the patient medically ready for discharge?: No    Reason for patient still in hospital (select all that apply): Consult recommendations and Pending disposition  Discharge Plan A: Rehab   Discharge Delays: None known at this time

## 2022-03-09 NOTE — PLAN OF CARE
Problem: Adult Inpatient Plan of Care  Goal: Plan of Care Review  Outcome: Ongoing, Progressing  Goal: Optimal Comfort and Wellbeing  Outcome: Ongoing, Progressing  Goal: Readiness for Transition of Care  Outcome: Ongoing, Progressing     Problem: Diabetes Comorbidity  Goal: Blood Glucose Level Within Targeted Range  Outcome: Ongoing, Progressing     Problem: Fluid and Electrolyte Imbalance (Acute Kidney Injury/Impairment)  Goal: Fluid and Electrolyte Balance  Outcome: Ongoing, Progressing     Problem: Renal Function Impairment (Acute Kidney Injury/Impairment)  Goal: Effective Renal Function  Outcome: Ongoing, Progressing     Problem: Impaired Wound Healing  Goal: Optimal Wound Healing  Outcome: Ongoing, Progressing     Problem: Fall Injury Risk  Goal: Absence of Fall and Fall-Related Injury  Outcome: Ongoing, Progressing     Problem: Skin Injury Risk Increased  Goal: Skin Health and Integrity  Outcome: Ongoing, Progressing

## 2022-03-09 NOTE — PT/OT/SLP PROGRESS
"Occupational Therapy   Treatment    Name: Kamar Muñoz  MRN: [id number]  Admitting Diagnosis:  Hypotension       Recommendations:     Discharge Recommendations: nursing facility, skilled  Discharge Equipment Recommendations:  none  Barriers to discharge:  None    Assessment:     Kamar Muñoz is a 76 y.o. male with a medical diagnosis of Hypotension.  He presents with excellent motivation for therapy. Performance deficits affecting function are weakness, impaired endurance, impaired functional mobilty, gait instability, impaired balance. Patient would benefit from continued skilled acute OT 4x/wk to improve functional mobility, increase independence with ADLs, and address established goals. Recommending SNF    once medically appropriate for discharge to increase maximal independence, reduce burden of care, and ensure safety.     Rehab Prognosis:  Good; patient would benefit from acute skilled OT services to address these deficits and reach maximum level of function.       Plan:     Patient to be seen 4 x/week to address the above listed problems via self-care/home management, therapeutic activities, therapeutic exercises, neuromuscular re-education  · Plan of Care Expires: 03/22/22  · Plan of Care Reviewed with: patient    Subjective   "My back hurts lying down and sitting up"  Pain/Comfort:  · Pain Rating 1: 0/10    Objective:     Communicated with: nurse prior to session.  Patient found HOB elevated with oxygen, pulse ox (continuous) upon OT entry to room.    General Precautions: Standard, fall   Orthopedic Precautions:N/A   Braces: N/A  Respiratory Status: Nasal cannula, flow 1 L/min     Occupational Performance:     Bed Mobility:    · Patient completed Rolling/Turning to Left with  stand by assistance  · Patient completed Scooting/Bridging with supervision  · Patient completed Supine to Sit with stand by assistance     Functional Mobility/Transfers:  · Patient completed Sit <> Stand Transfer with contact " guard assistance  with  rolling walker   · Functional Mobility: Pt able to functionally ambulate from bed to bedside chair with CGA and RW.    Activities of Daily Living:  · Upper Body Dressing: minimum assistance doff/don gown standing at chair.  · Lower Body Dressing: supervision to doff socks, max A to don seated in chair.  · Toileting: supervision pt forgot to take cap off urinal prior to use seated in chair.   · Grooming SBA for washing face, body with wipes.      [facility] 6 Click ADL: 18    Treatment & Education:  Role of OT and POC  Safety  ADL retraining  Functional mobility training  Discharge planning  Importance of EOB/OOB activity      Patient left up in chair with all lines intact, call button in reach and nurse notifiedEducation:      GOALS:   Multidisciplinary Problems     Occupational Therapy Goals        Problem: Occupational Therapy Goal    Goal Priority Disciplines Outcome Interventions   Occupational Therapy Goal     OT, PT/OT Ongoing, Progressing    Description: Goals to be met by: 3/6/22     Patient will increase functional independence with ADLs by performing:    Feeding with [place]. MET 3/3  UE Dressing with Stand-by Assistance.  LE Dressing with Minimal Assistance.  Grooming while standing with Stand-by Assistance.  Toileting from toilet with Stand-by Assistance for hygiene and clothing management. MET 3/6 from toilet  Toilet transfer to toilet with Stand-by Assistance.                     Time Tracking:     OT Date of Treatment: 03/09/22  OT Start Time: 1030  OT Stop Time: 1117  OT Total Time (min): 47 min    Billable Minutes:Self Care/Home Management 47    OT/JUAN: OT          3/9/2022

## 2022-03-09 NOTE — PROGRESS NOTES
Patient blood sugar at 1331 was taken after patient had ate lunch. Was rechecking to see if bg at 1230 was in fact 402. Meal dose and sliding scale insulin given. MD aware. Will continue to monitor.

## 2022-03-09 NOTE — SUBJECTIVE & OBJECTIVE
"Interval HPI:   Overnight events:    Pt with erratic PO intake  Did not eat much of his breakfast so no insulin given in am (told nurse to just give him a correction in 2 hours)      Eatin%  Nausea: No  Hypoglycemia and intervention: No  Fever: No  TPN and/or TF: No  If yes, type of TF/TPN and rate: NA    BP (!) 169/81   Pulse 71   Temp 97.7 °F (36.5 °C) (Oral)   Resp 19   Ht 6' 2" (1.88 m)   Wt 76.4 kg (168 lb 6.9 oz)   SpO2 (!) 94%   BMI 21.63 kg/m²     Labs Reviewed and Include    Recent Labs   Lab 22  0241   *   CALCIUM 7.8*   ALBUMIN 1.9*   *   K 4.7   CO2 24   CL 99   BUN 15   CREATININE 1.0     Lab Results   Component Value Date    WBC 5.74 2022    HGB 10.8 (L) 2022    HCT 33.8 (L) 2022    MCV 89 2022     2022     No results for input(s): TSH, FREET4 in the last 168 hours.  Lab Results   Component Value Date    HGBA1C 11.5 (H) 2022       Nutritional status:   Body mass index is 21.63 kg/m².  Lab Results   Component Value Date    ALBUMIN 1.9 (L) 2022    ALBUMIN 2.0 (L) 2022    ALBUMIN 1.9 (L) 2022     No results found for: PREALBUMIN    Estimated Creatinine Clearance: 65.8 mL/min (based on SCr of 1 mg/dL).    Accu-Checks  Recent Labs     22  1635 22  1854 22  1952 22  0742 22  0814 22  0947 22  1100 22  1551 22  1945 22  0805   POCTGLUCOSE 351* 380* 372* 45* 69* 171* 278* 347* 431* 206*       Current Medications and/or Treatments Impacting Glycemic Control  Immunotherapy:    Immunosuppressants       None          Steroids:   Hormones (From admission, onward)                Start     Stop Route Frequency Ordered    22 1824  melatonin tablet 6 mg         -- Oral Nightly PRN 22 1724          Pressors:    Autonomic Drugs (From admission, onward)                None          Hyperglycemia/Diabetes Medications:   Antihyperglycemics (From admission, " onward)                Start     Stop Route Frequency Ordered    03/08/22 2100  insulin detemir U-100 pen 6 Units         -- SubQ Nightly 03/08/22 1335    03/08/22 1645  insulin aspart U-100 pen 4 Units         -- SubQ 3 times daily with meals 03/08/22 1217    03/05/22 1145  insulin aspart U-100 pen 0-5 Units         -- SubQ Before meals, nightly and at 0100 PRN 03/05/22 1034

## 2022-03-09 NOTE — ASSESSMENT & PLAN NOTE
Endocrinology consulted for BG management.   BG goal 140-180    - Continue Levemir Flex Pen to 6 units nightly    - Novolog (aspart) insulin 4 Units SQ TIDWM with low dose correction insulin  - BG checks AC/HS/0200      ** Please notify Endocrine for any change and/or advance in diet**      Discharge Planning:   TBD. Please notify endocrinology prior to discharge.

## 2022-03-09 NOTE — PROGRESS NOTES
[facility] - Int[facility] ([place] [address])  Endocrinology  Progress Note    Admit Date: 2/19/2022     Reason for Consult: Management of T2DM, Hyperglycemia     Diabetes diagnosis year: 2010    Home Diabetes Medications:  Novolog 9 TIDWM + SSI; Levemir 20 units qd; Metformin 1000 mg BID    How often checking glucose at home? 1-3 x day   BG readings on regimen: 100-150's  Hypoglycemia on the regimen?  No  Missed doses on regimen?  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 History was not obtained from patient due to encephalopathy. Patient's daughter at bedside reports the patient was recently in the ED on 2/17 for a mechanical fall after being discharged from rehab the same day. CTH and cervical spine revealed  evolving late subacute infarct involving the right frontal lobe with questionable petechial hemorrhage. Vascular neurology evaluated the patient and cleared him for discharge from without any new interventions. He was also tested positive for COVID-19 and was discharged yesterday from the ED. He subsequently received one dose of sotrovimab infusion for COVID and was in a normal state of health until this morning when his daughter found him lethargic and barely responsive prompting her to bring him to the ED. She reports the patient had no complaints prior to developing his AMS. Of note, the patient was [place] [facility] [place]13 for AMS concerning for CVA, however he was treated for metabolic encephalopathy 2/2 to DKA/HHS, sepsis and BRENDAN. Upon arrival to the ED, he was placed on 6L NC,  "normotensive and tachycardic. Lactic 11.5, WBC 17.8, Glucose 1109, pH 7.17, Co2 15.6 HCO3 <5, AG unable to calculate. sCr 3.1. CXR with intersitial opacities. He received ~4L of IVF, vancomycin, zosyn, initiated on insulin shifted for hyperkalemia and calcium for cardioprotection. ECG without noticeable ischemic changes. CTH without new changes- discussed findings with radiology. Patient was admitted to the MICU for further management. Endocrine consulted to manage type 2 diabetes and hyperglycemia. Since admission patient has had fluctuations in BG control.             Interval HPI:   Overnight events:    Pt with erratic PO intake  Did not eat much of his breakfast so no insulin given in am (told nurse to just give him a correction in 2 hours)      Eatin%  Nausea: No  Hypoglycemia and intervention: No  Fever: No  TPN and/or TF: No  If yes, type of TF/TPN and rate: NA    BP (!) 169/81   Pulse 71   Temp 97.7 °F (36.5 °C) (Oral)   Resp 19   Ht 6' 2" (1.88 m)   Wt 76.4 kg (168 lb 6.9 oz)   SpO2 (!) 94%   BMI 21.63 kg/m²     Labs Reviewed and Include    Recent Labs   Lab 22  0241   *   CALCIUM 7.8*   ALBUMIN 1.9*   *   K 4.7   CO2 24   CL 99   BUN 15   CREATININE 1.0     Lab Results   Component Value Date    WBC 5.74 2022    HGB 10.8 (L) 2022    HCT 33.8 (L) 2022    MCV 89 2022     2022     No results for input(s): TSH, FREET4 in the last 168 hours.  Lab Results   Component Value Date    HGBA1C 11.5 (H) 2022       Nutritional status:   Body mass index is 21.63 kg/m².  Lab Results   Component Value Date    ALBUMIN 1.9 (L) 2022    ALBUMIN 2.0 (L) 2022    ALBUMIN 1.9 (L) 2022     No results found for: PREALBUMIN    Estimated Creatinine Clearance: 65.8 mL/min (based on SCr of 1 mg/dL).    Accu-Checks  Recent Labs     22  1635 22  1854 22  1952 22  0742 22  0814 22  0947 22  1100 " 03/08/22  1551 03/08/22  1945 03/09/22  0805   POCTGLUCOSE 351* 380* 372* 45* 69* 171* 278* 347* 431* 206*       Current Medications and/or Treatments Impacting Glycemic Control  Immunotherapy:    Immunosuppressants       None          Steroids:   Hormones (From admission, onward)                Start     Stop Route Frequency Ordered    02/23/22 1824  melatonin tablet 6 mg         -- Oral Nightly PRN 02/23/22 1724          Pressors:    Autonomic Drugs (From admission, onward)                None          Hyperglycemia/Diabetes Medications:   Antihyperglycemics (From admission, onward)                Start     Stop Route Frequency Ordered    03/08/22 2100  insulin detemir U-100 pen 6 Units         -- SubQ Nightly 03/08/22 1335    03/08/22 1645  insulin aspart U-100 pen 4 Units         -- SubQ 3 times daily with meals 03/08/22 1217    03/05/22 1145  insulin aspart U-100 pen 0-5 Units         -- SubQ Before meals, nightly and at 0100 PRN 03/05/22 1034            ASSESSMENT and PLAN    COVID-19 virus infection  Infection may elevate BG readings  Managed per primary team          Diabetic ketoacidosis with coma associated with type 2 diabetes mellitus  Now resolved.     Will continue to monitor in the event of BG excursions.       Encephalopathy, metabolic    Managed per primary team  Avoid hypoglycemia      Type 2 diabetes mellitus with hyperglycemia, with long-term current use of insulin  Endocrinology consulted for BG management.   BG goal 140-180    - Continue Levemir Flex Pen to 6 units nightly    - Novolog (aspart) insulin 4 Units SQ TIDWM with low dose correction insulin  - BG checks AC/HS/0200      ** Please notify Endocrine for any change and/or advance in diet**      Discharge Planning:   TBD. Please notify endocrinology prior to discharge.            Rajeev Sanderson MD  Endocrinology  [facility] - Intensive Care ([place])

## 2022-03-09 NOTE — PLAN OF CARE
Continue diabetic diet, increase boost glucose  To TID,RD following  Goals: Meet % EEN, EPN by RD f/u date  Nutrition Goal Status: progressing towards goal  Communication of RD Recs: other (comment) (POC)     Assessment and Plan    Inadequate energy intake     Related to (etiology):   Decreased appetite and AMS in the presence of increased needs 2/2 wound and infection     Signs and Symptoms (as evidenced by):   Poor PO intake 25-20%     Interventions(treatment strategy):  Collaboration of nutrition care w/ other providers  Commercial beverage( calories and protein) boost glucose TID  Carbohydrate modified diet -2000     Nutrition Diagnosis Status: Ongoing

## 2022-03-09 NOTE — PLAN OF CARE
Problem: Occupational Therapy Goal  Goal: Occupational Therapy Goal  Description: Goals to be met by: 3/6/22     Patient will increase functional independence with ADLs by performing:    Feeding with [place]. MET 3/3  UE Dressing with Stand-by Assistance.  LE Dressing with Minimal Assistance.  Grooming while standing with Stand-by Assistance.  Toileting from toilet with Stand-by Assistance for hygiene and clothing management. MET 3/6 from toilet  Toilet transfer to toilet with Stand-by Assistance.    Outcome: Ongoing, Progressing   Pts gaols remain appropriate

## 2022-03-09 NOTE — PROGRESS NOTES
"[facility] - Intensive Care ([facility]-)  Adult Nutrition  Progress Note    SUMMARY   Recommendations  Continue diabetic diet, increase boost glucose  To TID,RD following  Goals: Meet % EEN, EPN by RD f/u date  Nutrition Goal Status: progressing towards goal  Communication of RD Recs: other (comment) (POC)    Assessment and Plan    Inadequate energy intake     Related to (etiology):   Decreased appetite and AMS in the presence of increased needs 2/2 wound and infection     Signs and Symptoms (as evidenced by):   Poor PO intake 25-20%     Interventions(treatment strategy):  Collaboration of nutrition care w/ other providers  Commercial beverage( calories and protein) boost glucose TID  Carbohydrate modified diet -2000     Nutrition Diagnosis Status: Ongoing                                            Reason for Assessment    Reason For Assessment: RD follow-up  Diagnosis: other (see comments) (Encephalopathy)  Relevant Medical History: HTN, HLD, COPD, DM  Interdisciplinary Rounds: did not attend  General Information Comments: Isolation continue, PO 25-50%, note plan to dc to SNF, note unstageable wound  Nutrition Discharge Planning: DC on diabetic diet        Nutrition/Diet History    Spiritual, Cultural Beliefs, Taoism Practices, Values that Affect Care: no  Factors Affecting Nutritional Intake: decreased appetite    Anthropometrics    Temp: 98.1 °F (36.7 °C)  Height Method: Measured  Height: 6' 2" (188 cm)  Height (inches): 74 in  Weight Method: Bed Scale  Weight: 76.4 kg (168 lb 6.9 oz)  Weight (lb): 168.43 lb  Ideal Body Weight (IBW), Male: 190 lb  % Ideal Body Weight, Male (lb): 88.65 %  BMI (Calculated): 21.6  BMI Grade: 18.5-24.9 - normal       Lab/Procedures/Meds    Pertinent Labs Reviewed: reviewed  Pertinent Labs Comments: PO4 1.9  Pertinent Medications Reviewed: reviewed  Pertinent Medications Comments: MVI, Vit D, Vit C           Estimated/Assessed Needs    Weight Used For Calorie Calculations: " 76.4 kg (168 lb 6.9 oz)  Energy Calorie Requirements (kcal): 1941 kcal/d  Energy Need Method: Keith-St Jeor (1.25 PAL)  Protein Requirements: 92 g/d (1.2 g/kg)  Weight Used For Protein Calculations: 76.4 kg (168 lb 6.9 oz)     Estimated Fluid Requirement Method: other (see comments) (Per MD or 1 mL/kcal)  RDA Method (mL): 1941         Nutrition Prescription Ordered    Current Diet Order: 2000 diabetic  Oral Nutrition Supplement: Boost glucose chocolate daily    Evaluation of Received Nutrient/Fluid Intake    I/O: -118  Energy Calories Required: not meeting needs  Protein Required: not meeting needs  Fluid Required: meeting needs  Comments: LBM 3/3  Tolerance: tolerating  % Intake of Estimated Energy Needs: 25 - 50 %  % Meal Intake: 25 - 50 %    Nutrition Risk    Level of Risk/Frequency of Follow-up: low (one time per week)     Monitor and Evaluation    Food and Nutrient Intake: energy intake, food and beverage intake  Food and Nutrient Adminstration: diet order  Physical Activity and Function: nutrition-related ADLs and IADLs  Anthropometric Measurements: weight, weight change  Biochemical Data, Medical Tests and Procedures: inflammatory profile, lipid profile, glucose/endocrine profile, electrolyte and renal panel  Nutrition-Focused Physical Findings: overall appearance     Nutrition Follow-Up    RD Follow-up?: Yes

## 2022-03-10 ENCOUNTER — HOSPITAL ENCOUNTER (INPATIENT)
Facility: HOSPITAL | Age: 77
LOS: 22 days | Discharge: HOME HEALTH | DRG: 177 | End: 2022-04-01
Attending: HOSPITALIST | Admitting: HOSPITALIST
Payer: MEDICARE

## 2022-03-10 VITALS
SYSTOLIC BLOOD PRESSURE: 158 MMHG | HEIGHT: 74 IN | WEIGHT: 168.44 LBS | DIASTOLIC BLOOD PRESSURE: 74 MMHG | OXYGEN SATURATION: 95 % | TEMPERATURE: 98 F | RESPIRATION RATE: 20 BRPM | BODY MASS INDEX: 21.62 KG/M2 | HEART RATE: 87 BPM

## 2022-03-10 DIAGNOSIS — I27.9 CHRONIC PULMONARY HEART DISEASE: ICD-10-CM

## 2022-03-10 DIAGNOSIS — U07.1 COVID-19 VIRUS INFECTION: Primary | ICD-10-CM

## 2022-03-10 DIAGNOSIS — Z79.4 TYPE 2 DIABETES MELLITUS WITH HYPERGLYCEMIA, WITH LONG-TERM CURRENT USE OF INSULIN: Chronic | ICD-10-CM

## 2022-03-10 DIAGNOSIS — A41.9 SEPSIS: ICD-10-CM

## 2022-03-10 DIAGNOSIS — E11.65 TYPE 2 DIABETES MELLITUS WITH HYPERGLYCEMIA, WITH LONG-TERM CURRENT USE OF INSULIN: Chronic | ICD-10-CM

## 2022-03-10 LAB
BACTERIA BLD CULT: NORMAL
BACTERIA BLD CULT: NORMAL
BASOPHILS # BLD AUTO: 0.06 K/UL (ref 0–0.2)
BASOPHILS NFR BLD: 1.1 % (ref 0–1.9)
DIFFERENTIAL METHOD: ABNORMAL
EOSINOPHIL # BLD AUTO: 0.3 K/UL (ref 0–0.5)
EOSINOPHIL NFR BLD: 4.9 % (ref 0–8)
ERYTHROCYTE [DISTWIDTH] IN BLOOD BY AUTOMATED COUNT: 16.6 % (ref 11.5–14.5)
HCT VFR BLD AUTO: 34.3 % (ref 40–54)
HGB BLD-MCNC: 11.2 G/DL (ref 14–18)
IMM GRANULOCYTES # BLD AUTO: 0.02 K/UL (ref 0–0.04)
IMM GRANULOCYTES NFR BLD AUTO: 0.4 % (ref 0–0.5)
LYMPHOCYTES # BLD AUTO: 1.7 K/UL (ref 1–4.8)
LYMPHOCYTES NFR BLD: 30.2 % (ref 18–48)
MAGNESIUM SERPL-MCNC: 1.5 MG/DL (ref 1.6–2.6)
MCH RBC QN AUTO: 28.4 PG (ref 27–31)
MCHC RBC AUTO-ENTMCNC: 32.7 G/DL (ref 32–36)
MCV RBC AUTO: 87 FL (ref 82–98)
MONOCYTES # BLD AUTO: 0.6 K/UL (ref 0.3–1)
MONOCYTES NFR BLD: 10.8 % (ref 4–15)
NEUTROPHILS # BLD AUTO: 2.9 K/UL (ref 1.8–7.7)
NEUTROPHILS NFR BLD: 52.6 % (ref 38–73)
NRBC BLD-RTO: 0 /100 WBC
PLATELET # BLD AUTO: 222 K/UL (ref 150–450)
PMV BLD AUTO: 9.5 FL (ref 9.2–12.9)
POCT GLUCOSE: 178 MG/DL (ref 70–110)
POCT GLUCOSE: 207 MG/DL (ref 70–110)
POCT GLUCOSE: 268 MG/DL (ref 70–110)
POCT GLUCOSE: 396 MG/DL (ref 70–110)
POCT GLUCOSE: 425 MG/DL (ref 70–110)
RBC # BLD AUTO: 3.94 M/UL (ref 4.6–6.2)
WBC # BLD AUTO: 5.46 K/UL (ref 3.9–12.7)

## 2022-03-10 PROCEDURE — 1111F PR DISCHARGE MEDS RECONCILED W/ CURRENT OUTPATIENT MED LIST: ICD-10-PCS | Mod: 95,CPTII,CR, | Performed by: INTERNAL MEDICINE

## 2022-03-10 PROCEDURE — 25000003 PHARM REV CODE 250: Performed by: STUDENT IN AN ORGANIZED HEALTH CARE EDUCATION/TRAINING PROGRAM

## 2022-03-10 PROCEDURE — 25000003 PHARM REV CODE 250: Performed by: HOSPITALIST

## 2022-03-10 PROCEDURE — 25000003 PHARM REV CODE 250: Performed by: INTERNAL MEDICINE

## 2022-03-10 PROCEDURE — C9399 UNCLASSIFIED DRUGS OR BIOLOG: HCPCS | Performed by: HOSPITALIST

## 2022-03-10 PROCEDURE — 83735 ASSAY OF MAGNESIUM: CPT | Performed by: STUDENT IN AN ORGANIZED HEALTH CARE EDUCATION/TRAINING PROGRAM

## 2022-03-10 PROCEDURE — 36415 COLL VENOUS BLD VENIPUNCTURE: CPT | Performed by: STUDENT IN AN ORGANIZED HEALTH CARE EDUCATION/TRAINING PROGRAM

## 2022-03-10 PROCEDURE — 97110 THERAPEUTIC EXERCISES: CPT

## 2022-03-10 PROCEDURE — 99239 PR HOSPITAL DISCHARGE DAY,>30 MIN: ICD-10-PCS | Mod: 95,CR,, | Performed by: INTERNAL MEDICINE

## 2022-03-10 PROCEDURE — 11000004 HC SNF PRIVATE

## 2022-03-10 PROCEDURE — 85025 COMPLETE CBC W/AUTO DIFF WBC: CPT | Performed by: STUDENT IN AN ORGANIZED HEALTH CARE EDUCATION/TRAINING PROGRAM

## 2022-03-10 PROCEDURE — 63600175 PHARM REV CODE 636 W HCPCS: Performed by: HOSPITALIST

## 2022-03-10 PROCEDURE — 99239 HOSP IP/OBS DSCHRG MGMT >30: CPT | Mod: 95,CR,, | Performed by: INTERNAL MEDICINE

## 2022-03-10 PROCEDURE — 97530 THERAPEUTIC ACTIVITIES: CPT

## 2022-03-10 PROCEDURE — 1111F DSCHRG MED/CURRENT MED MERGE: CPT | Mod: 95,CPTII,CR, | Performed by: INTERNAL MEDICINE

## 2022-03-10 RX ORDER — AMOXICILLIN 250 MG
1 CAPSULE ORAL DAILY
Status: DISCONTINUED | OUTPATIENT
Start: 2022-03-11 | End: 2022-03-10

## 2022-03-10 RX ORDER — ASPIRIN 81 MG/1
81 TABLET ORAL DAILY
Status: DISCONTINUED | OUTPATIENT
Start: 2022-03-11 | End: 2022-04-01 | Stop reason: HOSPADM

## 2022-03-10 RX ORDER — LANOLIN ALCOHOL/MO/W.PET/CERES
400 CREAM (GRAM) TOPICAL 2 TIMES DAILY
Status: CANCELLED | OUTPATIENT
Start: 2022-03-10

## 2022-03-10 RX ORDER — LOPERAMIDE HYDROCHLORIDE 2 MG/1
2 CAPSULE ORAL 3 TIMES DAILY PRN
Refills: 0 | Status: ON HOLD
Start: 2022-03-10 | End: 2022-03-24 | Stop reason: HOSPADM

## 2022-03-10 RX ORDER — INSULIN ASPART 100 [IU]/ML
0-5 INJECTION, SOLUTION INTRAVENOUS; SUBCUTANEOUS
Status: DISCONTINUED | OUTPATIENT
Start: 2022-03-10 | End: 2022-04-01 | Stop reason: HOSPADM

## 2022-03-10 RX ORDER — DICLOFENAC SODIUM 10 MG/G
4 GEL TOPICAL 3 TIMES DAILY
Status: CANCELLED | OUTPATIENT
Start: 2022-03-10

## 2022-03-10 RX ORDER — CALCIUM CARBONATE 200(500)MG
500 TABLET,CHEWABLE ORAL 2 TIMES DAILY PRN
Status: DISCONTINUED | OUTPATIENT
Start: 2022-03-10 | End: 2022-04-01 | Stop reason: HOSPADM

## 2022-03-10 RX ORDER — ATORVASTATIN CALCIUM 20 MG/1
40 TABLET, FILM COATED ORAL DAILY
Status: DISCONTINUED | OUTPATIENT
Start: 2022-03-11 | End: 2022-04-01 | Stop reason: HOSPADM

## 2022-03-10 RX ORDER — AMIODARONE HYDROCHLORIDE 200 MG/1
200 TABLET ORAL DAILY
Status: CANCELLED | OUTPATIENT
Start: 2022-03-11

## 2022-03-10 RX ORDER — ALBUTEROL SULFATE 90 UG/1
2 AEROSOL, METERED RESPIRATORY (INHALATION) EVERY 4 HOURS PRN
Status: DISCONTINUED | OUTPATIENT
Start: 2022-03-10 | End: 2022-03-14

## 2022-03-10 RX ORDER — INSULIN ASPART 100 [IU]/ML
6 INJECTION, SOLUTION INTRAVENOUS; SUBCUTANEOUS
Status: DISCONTINUED | OUTPATIENT
Start: 2022-03-10 | End: 2022-03-16

## 2022-03-10 RX ORDER — LACOSAMIDE 50 MG/1
100 TABLET ORAL EVERY 12 HOURS
Status: DISCONTINUED | OUTPATIENT
Start: 2022-03-10 | End: 2022-04-01 | Stop reason: HOSPADM

## 2022-03-10 RX ORDER — LANOLIN ALCOHOL/MO/W.PET/CERES
400 CREAM (GRAM) TOPICAL 2 TIMES DAILY
Status: DISCONTINUED | OUTPATIENT
Start: 2022-03-10 | End: 2022-03-28

## 2022-03-10 RX ORDER — ALBUTEROL SULFATE 90 UG/1
2 AEROSOL, METERED RESPIRATORY (INHALATION) EVERY 4 HOURS PRN
Start: 2022-03-10 | End: 2022-03-24

## 2022-03-10 RX ORDER — DICLOFENAC SODIUM 10 MG/G
4 GEL TOPICAL 3 TIMES DAILY
Status: DISCONTINUED | OUTPATIENT
Start: 2022-03-10 | End: 2022-04-01 | Stop reason: HOSPADM

## 2022-03-10 RX ORDER — GABAPENTIN 100 MG/1
200 CAPSULE ORAL 3 TIMES DAILY
Status: CANCELLED | OUTPATIENT
Start: 2022-03-10

## 2022-03-10 RX ORDER — INSULIN ASPART 100 [IU]/ML
0-5 INJECTION, SOLUTION INTRAVENOUS; SUBCUTANEOUS
Refills: 0 | Status: ON HOLD
Start: 2022-03-10 | End: 2022-03-24 | Stop reason: HOSPADM

## 2022-03-10 RX ORDER — LOSARTAN POTASSIUM 25 MG/1
25 TABLET ORAL DAILY
Status: DISCONTINUED | OUTPATIENT
Start: 2022-03-11 | End: 2022-04-01 | Stop reason: HOSPADM

## 2022-03-10 RX ORDER — OXYCODONE HYDROCHLORIDE 5 MG/1
5 TABLET ORAL EVERY 6 HOURS PRN
Refills: 0 | Status: ON HOLD
Start: 2022-03-10 | End: 2022-03-24 | Stop reason: SDUPTHER

## 2022-03-10 RX ORDER — TALC
6 POWDER (GRAM) TOPICAL NIGHTLY PRN
Status: DISCONTINUED | OUTPATIENT
Start: 2022-03-10 | End: 2022-04-01 | Stop reason: HOSPADM

## 2022-03-10 RX ORDER — GABAPENTIN 100 MG/1
200 CAPSULE ORAL 3 TIMES DAILY
Qty: 180 CAPSULE | Refills: 11 | Status: ON HOLD
Start: 2022-03-10 | End: 2022-03-24 | Stop reason: SDUPTHER

## 2022-03-10 RX ORDER — LIDOCAINE 50 MG/G
1 PATCH TOPICAL
Status: DISCONTINUED | OUTPATIENT
Start: 2022-03-11 | End: 2022-04-01 | Stop reason: HOSPADM

## 2022-03-10 RX ORDER — TALC
6 POWDER (GRAM) TOPICAL NIGHTLY PRN
Refills: 0 | Status: ON HOLD
Start: 2022-03-10 | End: 2022-03-24 | Stop reason: HOSPADM

## 2022-03-10 RX ORDER — ACETAMINOPHEN 325 MG/1
650 TABLET ORAL EVERY 6 HOURS PRN
Status: DISCONTINUED | OUTPATIENT
Start: 2022-03-10 | End: 2022-04-01 | Stop reason: HOSPADM

## 2022-03-10 RX ORDER — OXYCODONE HYDROCHLORIDE 5 MG/1
5 TABLET ORAL EVERY 6 HOURS PRN
Status: DISCONTINUED | OUTPATIENT
Start: 2022-03-10 | End: 2022-04-01 | Stop reason: HOSPADM

## 2022-03-10 RX ORDER — LIDOCAINE 50 MG/G
1 PATCH TOPICAL
Status: CANCELLED | OUTPATIENT
Start: 2022-03-10

## 2022-03-10 RX ORDER — INSULIN ASPART 100 [IU]/ML
6 INJECTION, SOLUTION INTRAVENOUS; SUBCUTANEOUS
Refills: 0 | Status: ON HOLD
Start: 2022-03-10 | End: 2022-03-24 | Stop reason: HOSPADM

## 2022-03-10 RX ORDER — ASPIRIN 81 MG/1
81 TABLET ORAL DAILY
Status: CANCELLED | OUTPATIENT
Start: 2022-03-11

## 2022-03-10 RX ORDER — LOPERAMIDE HYDROCHLORIDE 2 MG/1
2 CAPSULE ORAL EVERY 8 HOURS PRN
Status: CANCELLED | OUTPATIENT
Start: 2022-03-10

## 2022-03-10 RX ORDER — INSULIN GLARGINE 100 [IU]/ML
6 INJECTION, SOLUTION SUBCUTANEOUS NIGHTLY
Qty: 5.4 ML | Refills: 3 | Status: ON HOLD
Start: 2022-03-10 | End: 2022-03-24 | Stop reason: SDUPTHER

## 2022-03-10 RX ORDER — DICLOFENAC SODIUM 10 MG/G
4 GEL TOPICAL 3 TIMES DAILY
Status: ON HOLD
Start: 2022-03-10 | End: 2023-02-01 | Stop reason: HOSPADM

## 2022-03-10 RX ORDER — GABAPENTIN 100 MG/1
200 CAPSULE ORAL 3 TIMES DAILY
Status: DISCONTINUED | OUTPATIENT
Start: 2022-03-10 | End: 2022-04-01 | Stop reason: HOSPADM

## 2022-03-10 RX ORDER — GLUCAGON 1 MG
1 KIT INJECTION
Status: DISCONTINUED | OUTPATIENT
Start: 2022-03-10 | End: 2022-04-01 | Stop reason: HOSPADM

## 2022-03-10 RX ORDER — IBUPROFEN 200 MG
16 TABLET ORAL
Status: DISCONTINUED | OUTPATIENT
Start: 2022-03-10 | End: 2022-04-01 | Stop reason: HOSPADM

## 2022-03-10 RX ORDER — LOPERAMIDE HYDROCHLORIDE 2 MG/1
2 CAPSULE ORAL
Status: DISCONTINUED | OUTPATIENT
Start: 2022-03-10 | End: 2022-04-01 | Stop reason: HOSPADM

## 2022-03-10 RX ORDER — AMIODARONE HYDROCHLORIDE 200 MG/1
200 TABLET ORAL DAILY
Status: DISCONTINUED | OUTPATIENT
Start: 2022-03-11 | End: 2022-04-01 | Stop reason: HOSPADM

## 2022-03-10 RX ORDER — OXYCODONE HYDROCHLORIDE 5 MG/1
5 TABLET ORAL EVERY 6 HOURS PRN
Status: CANCELLED | OUTPATIENT
Start: 2022-03-10

## 2022-03-10 RX ORDER — SUCRALFATE 1 G/1
1 TABLET ORAL
Status: ON HOLD
Start: 2022-03-10 | End: 2022-03-24 | Stop reason: SDUPTHER

## 2022-03-10 RX ORDER — LIDOCAINE 50 MG/G
1 PATCH TOPICAL DAILY
Refills: 0 | Status: ON HOLD
Start: 2022-03-10 | End: 2022-03-24 | Stop reason: HOSPADM

## 2022-03-10 RX ORDER — ATORVASTATIN CALCIUM 20 MG/1
40 TABLET, FILM COATED ORAL DAILY
Status: CANCELLED | OUTPATIENT
Start: 2022-03-11

## 2022-03-10 RX ORDER — CLOPIDOGREL BISULFATE 75 MG/1
75 TABLET ORAL DAILY
Status: DISCONTINUED | OUTPATIENT
Start: 2022-03-11 | End: 2022-04-01 | Stop reason: HOSPADM

## 2022-03-10 RX ORDER — LANOLIN ALCOHOL/MO/W.PET/CERES
400 CREAM (GRAM) TOPICAL 2 TIMES DAILY
Refills: 0
Start: 2022-03-10 | End: 2022-10-13

## 2022-03-10 RX ORDER — IBUPROFEN 200 MG
24 TABLET ORAL
Status: DISCONTINUED | OUTPATIENT
Start: 2022-03-10 | End: 2022-04-01 | Stop reason: HOSPADM

## 2022-03-10 RX ORDER — SUCRALFATE 1 G/1
1 TABLET ORAL
Status: DISCONTINUED | OUTPATIENT
Start: 2022-03-10 | End: 2022-04-01 | Stop reason: HOSPADM

## 2022-03-10 RX ORDER — ERGOCALCIFEROL 1.25 MG/1
50000 CAPSULE ORAL
Status: DISCONTINUED | OUTPATIENT
Start: 2022-03-11 | End: 2022-04-01 | Stop reason: HOSPADM

## 2022-03-10 RX ORDER — PANTOPRAZOLE SODIUM 40 MG/1
40 TABLET, DELAYED RELEASE ORAL DAILY
Status: DISCONTINUED | OUTPATIENT
Start: 2022-03-11 | End: 2022-04-01 | Stop reason: HOSPADM

## 2022-03-10 RX ORDER — AMOXICILLIN 250 MG
1 CAPSULE ORAL 2 TIMES DAILY
Status: DISCONTINUED | OUTPATIENT
Start: 2022-03-10 | End: 2022-04-01 | Stop reason: HOSPADM

## 2022-03-10 RX ADMIN — Medication 400 MG: at 09:03

## 2022-03-10 RX ADMIN — SENNOSIDES AND DOCUSATE SODIUM 1 TABLET: 50; 8.6 TABLET ORAL at 09:03

## 2022-03-10 RX ADMIN — INSULIN ASPART 1 UNITS: 100 INJECTION, SOLUTION INTRAVENOUS; SUBCUTANEOUS at 01:03

## 2022-03-10 RX ADMIN — GABAPENTIN 200 MG: 100 CAPSULE ORAL at 09:03

## 2022-03-10 RX ADMIN — SUCRALFATE 1 G: 1 TABLET ORAL at 09:03

## 2022-03-10 RX ADMIN — INSULIN ASPART 6 UNITS: 100 INJECTION, SOLUTION INTRAVENOUS; SUBCUTANEOUS at 09:03

## 2022-03-10 RX ADMIN — LACOSAMIDE 100 MG: 50 TABLET, FILM COATED ORAL at 09:03

## 2022-03-10 RX ADMIN — ATORVASTATIN CALCIUM 40 MG: 20 TABLET, FILM COATED ORAL at 09:03

## 2022-03-10 RX ADMIN — DICLOFENAC SODIUM 4 G: 10 GEL TOPICAL at 09:03

## 2022-03-10 RX ADMIN — INSULIN DETEMIR 6 UNITS: 100 INJECTION, SOLUTION SUBCUTANEOUS at 09:03

## 2022-03-10 RX ADMIN — ASPIRIN 81 MG: 81 TABLET, COATED ORAL at 09:03

## 2022-03-10 RX ADMIN — CLOPIDOGREL 75 MG: 75 TABLET, FILM COATED ORAL at 09:03

## 2022-03-10 RX ADMIN — INSULIN ASPART 5 UNITS: 100 INJECTION, SOLUTION INTRAVENOUS; SUBCUTANEOUS at 05:03

## 2022-03-10 RX ADMIN — SUCRALFATE 1 G: 1 TABLET ORAL at 05:03

## 2022-03-10 RX ADMIN — SUCRALFATE 1 G: 1 TABLET ORAL at 01:03

## 2022-03-10 RX ADMIN — DICLOFENAC SODIUM 4 G: 10 GEL TOPICAL at 01:03

## 2022-03-10 RX ADMIN — INSULIN ASPART 6 UNITS: 100 INJECTION, SOLUTION INTRAVENOUS; SUBCUTANEOUS at 05:03

## 2022-03-10 RX ADMIN — APIXABAN 5 MG: 5 TABLET, FILM COATED ORAL at 09:03

## 2022-03-10 RX ADMIN — LACOSAMIDE 100 MG: 100 TABLET, FILM COATED ORAL at 09:03

## 2022-03-10 RX ADMIN — OXYCODONE HYDROCHLORIDE AND ACETAMINOPHEN 500 MG: 500 TABLET ORAL at 09:03

## 2022-03-10 RX ADMIN — INSULIN ASPART 6 UNITS: 100 INJECTION, SOLUTION INTRAVENOUS; SUBCUTANEOUS at 01:03

## 2022-03-10 RX ADMIN — ACETAMINOPHEN 650 MG: 325 TABLET ORAL at 09:03

## 2022-03-10 RX ADMIN — APIXABAN 5 MG: 2.5 TABLET, FILM COATED ORAL at 09:03

## 2022-03-10 RX ADMIN — PANTOPRAZOLE SODIUM 40 MG: 40 TABLET, DELAYED RELEASE ORAL at 09:03

## 2022-03-10 RX ADMIN — MULTIPLE VITAMINS W/ MINERALS TAB 1 TABLET: TAB at 09:03

## 2022-03-10 RX ADMIN — AMIODARONE HYDROCHLORIDE 200 MG: 200 TABLET ORAL at 09:03

## 2022-03-10 NOTE — ASSESSMENT & PLAN NOTE
Follows up with Pulmonary clinic in [place]. Last seen in December and was evaluated for pulmonary nodules. Deemed to have mild COPD per notes. Not on any maintenance therapy.  - Continue albuterol inhaler

## 2022-03-10 NOTE — ASSESSMENT & PLAN NOTE
PT/OT recommend rehab.  Patient's wife will be going to [facility] and family wish for them to stay together.  COVID isolation complete on 3/9.  Patient is eager to be transferred to [facility]

## 2022-03-10 NOTE — PLAN OF CARE
JASIEL scheduled d/c transportation to [facility]  through [facility]. Patient is scheduled to be picked up at 5:15 pm. JASIEL provided patient's nurse with number for report [phone]. JASIEL is in communication with patient's CM and patient's Care Team.      03/10/22 1508   Post-Acute Status   Post-Acute Authorization Placement   Post-Acute Placement Status Set-up Complete/Auth obtained     Marley Gregg LMSW   - [facility]  Ext. [phone]

## 2022-03-10 NOTE — ASSESSMENT & PLAN NOTE
Endocrinology consulted for BG management.   BG goal 140-180    - Continue Levemir Flex Pen to 6 units nightly    - Novolog (aspart) insulin 6 Units SQ TIDWM with low dose correction insulin  - BG checks AC/HS/0200   -If  BG remain at goal today, can consider DC on current regimen      ** Please notify Endocrine for any change and/or advance in diet**      Discharge Planning:   TBD. Please notify endocrinology prior to discharge.

## 2022-03-10 NOTE — NURSING
Patient arrived to room 311 via wheelchair with transport.  Patient is a,a,ox4, VSS, and has no complaints at this time.  Patient's , and asymptomatic.  NP notified, new orders were to give 11 units of novolog as ordered and continue to monitor.  Patient also has two full thickness pressure ulcers.  One to the sacrum and one to left buttocks.  Patient now resting comfortable in bed locked in lowest position, side rails up x3, and call bell in reach.  Will continue to monitor.

## 2022-03-10 NOTE — PROGRESS NOTES
[facility] ([place] [id number])  [facility] Medicine  Telemedicine Progress Note    Patient Name: Kamar Muñoz  MRN: [id number]  Patient Class: IP- Inpatient   Admission Date: 2/19/2022  Length of Stay: 18 days  Attending Physician: Tiffany Awad MD  Primary Care Provider: Basim Guerrero MD      Subjective:     Principal Problem:Hypotension    HPI:  Kamar Muñoz is a 76 year old male with past medical history of CAD s/p 2 LAUREN in RCA (Jan 2022), HTN, HLD, p. A. 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 History was not obtained from patient due to encephalopathy. Patient's daughter at bedside reports the patient was recently in the ED on 2/17 for a mechanical fall after being discharged from rehab the same day. CTH and cervical spine revealed  evolving late subacute infarct involving the right frontal lobe with questionable petechial hemorrhage. Vascular neurology evaluated the patient and cleared him for discharge from without any new interventions. He was also tested positive for COVID-19 and was discharged yesterday from the ED. He subsequently received one dose of sotrovimab infusion for COVID and was in a normal state of health until this morning when his daughter found him lethargic and barely responsive prompting her to bring him to the ED. She reports the patient had no complaints prior to developing his AMS. Of note, the patient was recently admitted to [facility] from 01/25 through 02/13 for AMS concerning for CVA, however he was treated for  metabolic encephalopathy 2/2 to DKA/HHS, sepsis and BRENDAN.      Upon arrival to the ED, he was placed on 6L NC, normotensive and tachycardic. Lactic 11.5, WBC 17.8, Glucose 1109, pH 7.17, Co2 15.6 HCO3 <5, AG unable to calculate. sCr 3.1. CXR with intersitial opacities. He received ~4L of IVF, vancomycin, zosyn, initiated on insulin shifted for hyperkalemia and calcium for cardioprotection. ECG without noticeable ischemic changes. CTH without new changes- discussed findings with radiology. Patient was admitted to the MICU for further management.        Overview/Hospital Course:  ICU Course:  Placed on remdesivir and broad spectrum IV abx in addition to insulin per DKA protocol. In MICU: Weaned supp O2 to room air; Transitioned to subq insulin; Improvement of AMS. BRENDAN improving gradually. Pt had discussion w/ family in MICU and transitioned from full code to DNR/DNI.  Step down to  initiated 2/21.    Hospital Medicine Course:  Pt w/ episode of CP and hypoglycemia upon stepdown. Ischemic work-up largely unremarkable with trop down trending from admission. Detemir dosing adjusted from BID to qhs. He had additional hypoglycemic episode upon re-uptitration of long-acting insulin from 12 to 15. Dosing decreased to 13u as patient was hyperglycemic to 230s w/12u qhs. Worsening hyperglycemia to 270s- insulin dose modified to 3u TID wm, and detemir 14u qhs.    Episode of abdominal pain and diarrhea 02/25- appeared to be 2/2 antibiotic use. Antibiotics stopped 02/25. Pt w/persistent diarrhea- c diff studies sent- loperamide stopped.     Pt was found to have St 3 sacral pressure ulcer.     Patient reportedly hypotensive on 3/5 and endorsing nausea, septic work up ordered but ultimately unrevealing. Patient's BP responded after 2.5L of fluid. Presume hypovolemia was the source of his hypotension      Telemedicine  This service was provided by Virtual Visit.    Patient was transferred to [facility] on:   "3/8/2022    Chief Complaint   Patient presents with    Vomiting     Vomiting and altered mental status all day. COVID+     The patient location is: [id number] A   Admitted 2/19/2022  6:57 PM  Present with the patient at the time of the telemed/virtual assessment: N/A    Interval History / Events Overnight:   The patient is able to provide adequate history. Additional history was obtained from past medical records. No significant events reported by Nursing. "Lunch" pre-meal BGs were checked post-prandially today => over 400.  Patient complains of fatigue. Symptoms have improved since yesterday. Associated symptoms include: malaise. Symptoms are decreasing in severity.     Lab test(s) reviewed: H&H stable; hyperglycemia    Review of Systems   Constitutional:  Negative for fever.   Skin:  Positive for wound.     Objective:     Vital Signs (Most Recent):  Temp: 97.5 °F (36.4 °C) (03/09/22 1233)  Pulse: 84 (03/09/22 1233)  Resp: 20 (03/09/22 1233)  BP: 131/63 (03/09/22 1233)  SpO2: (!) 94 % (03/09/22 1030) Vital Signs (24h Range):  Temp:  [97.5 °F (36.4 °C)-98.3 °F (36.8 °C)] 97.5 °F (36.4 °C)  Pulse:  [] 84  Resp:  [18-20] 20  SpO2:  [92 %-96 %] 94 %  BP: (114-169)/(57-81) 131/63     Weight: 76.4 kg (168 lb 6.9 oz)  Body mass index is 21.63 kg/m².  No intake or output data in the 24 hours ending 03/09/22 1247     Physical Exam  Constitutional:       General: He is not in acute distress.     Appearance: Normal appearance.   Eyes:      General: Lids are normal. No scleral icterus.        Right eye: No discharge.         Left eye: No discharge.      Conjunctiva/sclera: Conjunctivae normal.   Neck:      Trachea: Phonation normal.   Cardiovascular:      Rate and Rhythm: Normal rate.      Comments: Monitor / Vital signs reviewed at time of visit  Pulmonary:      Effort: Pulmonary effort is normal. No tachypnea, accessory muscle usage or respiratory distress.   Abdominal:      General: There is no distension.   Skin:   "   Coloration: Skin is not cyanotic.   Neurological:      Mental Status: He is alert. He is not disoriented.   Psychiatric:         Attention and Perception: Attention normal.         Mood and Affect: Affect normal.         Behavior: Behavior is cooperative.       Significant Labs:     Recent Labs   Lab 12/29/21  0810 01/26/22  1512   HGBA1C 12.3* 11.5*       Recent Labs   Lab 03/08/22  1945 03/09/22  0805 03/09/22  1236   POCTGLUCOSE 431* 206* 402*       Recent Labs   Lab 03/07/22  0429 03/08/22  0556 03/09/22  0241   WBC 7.22 7.34 5.74   HGB 11.1* 11.6* 10.8*   HCT 34.9* 36.2* 33.8*    259 216       Recent Labs   Lab 03/07/22 0429 03/08/22  0556 03/09/22  0241   GRAN 70.8  5.1 55.4  4.1 61.3  3.5   LYMPH 16.5*  1.2 29.0  2.1 23.7  1.4   MONO 7.2  0.5 9.7  0.7 10.5  0.6   EOS 0.3 0.4 0.2       Recent Labs   Lab 03/07/22  0429 03/08/22  0556 03/09/22  0241    139 134*   K 3.8 3.6 4.7    102 99   CO2 26 29 24   BUN 16 11 15   CREATININE 0.9 0.7 1.0   * 51* 234*   CALCIUM 7.8* 8.1* 7.8*   ALBUMIN 1.9* 2.0* 1.9*   MG 1.5* 1.7 1.7   PHOS 2.0* 1.9* 2.1*       Recent Labs   Lab 03/06/22  0345 03/07/22  0429 03/08/22  0556   ALKPHOS 99 101 122   ALT 10 12 16   AST 19 20 27   PROT 5.7* 5.7* 6.2   BILITOT 0.5 0.4 0.5       Procalcitonin (ng/mL)   Date Value   02/19/2022 0.93 (H)     Lactate (Lactic Acid) (mmol/L)   Date Value   03/05/2022 1.0   03/05/2022 1.9   02/20/2022 4.4 (HH)   02/20/2022 4.9 (HH)   02/19/2022 11.5 (HH)     BNP (pg/mL)   Date Value   03/09/2022 568 (H)   02/19/2022 481 (H)   01/25/2022 658 (H)   02/01/2019 60   11/27/2018 33     Sed Rate   Date Value   02/19/2022 92 mm/Hr (H)   06/01/2007 3 mm/hr     D-Dimer (mg/L FEU)   Date Value   03/07/2022 0.43   03/05/2022 0.50 (H)   03/03/2022 0.50 (H)   03/01/2022 0.41   02/27/2022 0.37   02/25/2022 0.47   02/21/2022 0.50 (H)   02/19/2022 0.84 (H)     Ferritin (ng/mL)   Date Value   03/07/2022 334 (H)   03/05/2022 354 (H)    03/03/2022 300   03/01/2022 274   02/27/2022 312 (H)   02/25/2022 337 (H)   02/21/2022 481 (H)   02/19/2022 564 (H)     LD (U/L)   Date Value   03/07/2022 204   03/05/2022 188   03/03/2022 175   03/01/2022 179   02/27/2022 217   02/25/2022 279 (H)   02/21/2022 294 (H)   02/19/2022 318 (H)     Troponin I (ng/mL)   Date Value   03/05/2022 0.019   02/21/2022 0.470 (H)   02/21/2022 0.654 (H)   02/20/2022 0.704 (H)   02/20/2022 0.390 (H)   02/19/2022 0.037 (H)   01/26/2022 0.820 (H)   01/26/2022 1.042 (H)     CPK (U/L)   Date Value   02/19/2022 95   01/13/2022 271 (H)   10/21/2011 188   01/08/2008 140   06/01/2007 83   05/21/2007 403 (H)   04/30/2007 293 (H)     SARS-CoV2 (COVID-19) Qualitative PCR (no units)   Date Value   03/08/2022 Not Detected     POC Rapid COVID (no units)   Date Value   02/17/2022 Positive (A)   01/25/2022 Negative   01/12/2022 Negative   01/09/2022 Negative     Microbiology Results (last 7 days)       Procedure Component Value Units Date/Time    Blood culture [[id number]] Collected: 03/05/22 1037    Order Status: Completed Specimen: Blood Updated: 03/09/22 1222     Blood Culture, Routine No Growth to date      No Growth to date      No Growth to date      No Growth to date      No Growth to date    Narrative:      Aerobic and anaerobic    Blood culture [[id number]] Collected: 03/05/22 1037    Order Status: Completed Specimen: Blood Updated: 03/09/22 1222     Blood Culture, Routine No Growth to date      No Growth to date      No Growth to date      No Growth to date      No Growth to date    Narrative:      Aerobic and anaerobic          ECG Results              EKG 12-lead (Final result)  Result time 02/20/22 09:32:21      Final result by Interface, Lab In [facility] (02/20/22 09:32:21)                   Narrative:    Test Reason :     Vent. Rate : 126 BPM     Atrial Rate : 120 BPM     P-R Int : 000 ms          QRS Dur : 162 ms      QT Int : 412 ms       P-R-T Axes : 000 059 -31 degrees     QTc Int  : 596 ms    Wide QRS tachycardia  Right bundle branch block  T wave abnormality, consider inferior ischemia  Abnormal ECG  When compared with ECG of 19-FEB-2022 19:09,  Wide QRS tachycardia has replaced Junctional rhythm or SVT  Confirmed by Lencho BARROS MD ([id number]) on 2/20/2022 9:32:14 AM    Referred By: GAGAN   SELF           Confirmed By:Lencho BARROS MD                                     Repeat EKG 12-lead (Final result)  Result time 02/20/22 09:34:32      Final result by Interface, Lab In [facility] (02/20/22 09:34:32)                   Narrative:    Test Reason :     Vent. Rate : 118 BPM     Atrial Rate : 117 BPM     P-R Int : 000 ms          QRS Dur : 118 ms      QT Int : 374 ms       P-R-T Axes : 000 056 -21 degrees     QTc Int : 524 ms    Accelerated Junctional rhythm vs SVT with artifact  Nonspecific intraventricular conduction delay  ST and T wave abnormality, consider inferior ischemia  Prolonged QT  Abnormal ECG  When compared with ECG of 27-JAN-2022 00:52,  Rhythm changed  Vent. rate has increased BY  50 BPM  Questionable change in QRS duration  Confirmed by Lencho BARROS MD ([id number]) on 2/20/2022 9:34:22 AM    Referred By: [email or web address]   SELF           Confirmed By:Lencho BARROS MD                                  X-Ray Chest AP Portable  Narrative: EXAMINATION:  XR CHEST AP PORTABLE    CLINICAL HISTORY:  hypotension;    TECHNIQUE:  Single frontal view of the chest was performed.    COMPARISON:  02/25/2022    FINDINGS:  Bilateral mid and lower lung zone airspace disease appears slightly improved when compared to 02/25/2022.  No right pleural effusion.  No large left pleural effusion, noting the left costophrenic angle is not seen.  Heart size is unchanged.  Impression: Bilateral predominantly mid and lower lung zone airspace disease, improved when compared to 02/25/2022.  This could be secondary to pulmonary edema, infection or aspiration.    Electronically signed by: Diana  Jagdish  Date:    03/05/2022  Time:    12:38      Labs and Imaging within the last 24 hours listed above were reviewed.       Diet: Diet diabetic [facility]; 2000 Calorie  Significant LDAs:   IV Access Type: Peripheral  Urinary Catheter Indication if present: Patient Does Not Have Urinary Catheter  Other Lines/Tubes/Drains:    HIGH RISK CONDITION(S):   Patient has a condition that poses threat to life and bodily function: Severe Respiratory Distress     Goals of Care:    Previous admission:  2/17/22  Likely prognosis:  Fair  Code Status: DNR  Comfort Only: No  Hospice: No  Goals at discharge: remain at home, with physician follow-up    Discharge Planning   ALLIE: 3/10/2022     Code Status: DNR   Is the patient medically ready for discharge?: No    Reason for patient still in hospital (select all that apply): Consult recommendations and Pending disposition  Discharge Plan A: Rehab   Discharge Delays: None known at this time      Assessment/Plan:      * Hypotension  Patient's hypotension on 3/5 resolved after fluids. He had labs consistent with pre-renal azotemia causing a recurrent BRENDAN / renal insufficiency as well that responded after fluids. Hypovolemia perhaps secondary to glucosuric diuresis due to uncontrolled hyperglycemia.   - BCx x2 ordered, NGTD. CXR slightly improved from before when patient admitted for COVID. UA not collected but patient denying any dysuria.   - Discontinued empiric antibiotics.     Discharge planning issues  PT/OT recommend rehab.  Patient's wife will be going to [facility] and family wish for them to stay together.  COVID isolation complete on 3/9.  Patient is eager to be transferred to [facility]    Pressure injury of buttock, unstageable  Seen by wound care with [facility] started  Continues to cause patient discomfort.    Severe sepsis  Upon admission:  Organ dysfunction indicated by Acute kidney injury, Encephalopathy  and Acute respiratory failure  Source- Unclear. CXR with diffuse  interstitial opacities, however no consolidation noted. UA with pyuria, without elevated leuks. Blood cultures negative. Possibly due to sacral wound, although doesn't look grossly infected.   Started on broad spectrum abx at admission with vanc/Zosyn/doxy.     Palliative care status  Per MICU: patient wishes to be DNR/DNI and family agrees. Still want to continue full medical care     COVID-19 virus infection  Viral Pneumonia due to COVID-19  COVID vaccinated with booster.   Received 1 dose of sotrovimab infusion 02/18/2022  - Diagnostics: Trend LDH, CRP, Ferritin, D-dimer Q48hrs  - Monitoring:          - Telemetry & Continuous Pulse Oximetry  - Completed 5 days of remdesivir, had dexamethasone held initially due to DKA. Currently asymptomatic from COVID-19.   - Stable on room air    - Nutrition:           - Multivitamin PO daily          - Add Boost supplement          - Vitamin D 1000IU daily if deficient          - Ascorbic acid 500mg PO bid    - Supportive Care:          - acetaminophen 650mg PO Q6hr PRN fever/headache          - loperamide PRN viral diarrhea    End isolation on 3/9/2022    Diabetic ketoacidosis with coma associated with type 2 diabetes mellitus  RESOLVED  Patient with uncontrolled DM presenting with metabolic encephalopathy in the setting of DKA with coma. Suspect patient developed DKA in the setting of sepsis/ COVID-19   Glucose 1109, pH 7.17, Co2 15.6 HCO3 <5, AG unable to calculate  DKA protocol completed and DKA resolved in MICU and Pt stepped down on subq insulin.  Hypoglycemia episode upon step-down, detemir adjusted from BID to qhs per home long acting insulin and due to episode of hypoglycemia   Continue aspart TIDWM  Diabetic diet  POCT BGx4  Cotinue to titrate short and long acting insulin needs as indicated to hospital goal 140-180  Endocrinology continues to adjust insulin doses.    Focal seizures  Continue home lancosamide     Encephalopathy, metabolic  In the setting of DKA and  sepsis upon admission.  Admit CT Head without any concerning new findings- no new infarct as discussed with Radiology  Back to baseline.    BRENDAN (acute kidney injury)  sCr maxed at 3.1, baseline 1.1-1.3. Likely prerenal in the setting of DKA. Improved with treatment of DKA.   Had worsening renal function when hypotensive from hypovolemia 3/5 that also improved with fluids  - Avoid nephrotoxins, renally dose meds  - resolved    Paroxysmal atrial fibrillation  History of paroxysmal atrial fibrillation on home  Metoprolol XL 50 mg daily, diltiazem  mg daily and amiodarone 200 mg daily.  - Continue po apixaban  - Continue amiodarone  - Holding metoprolol due to hypotension/bradycardia    Embolic stroke involving right middle cerebral artery  Hx of CVA in Jan 2022. CT Head with evolving infarcts in right frontal and left cerebellar hemispheres.   Has had issues with memory per family since CVA   No concern for acute stroke this admit per discussion with Radiology.   - Continue home antiplatelets, statin and Eliquis    Coronary artery disease involving native coronary artery of native heart with unstable angina pectoris  Hx of CAD s/p placement of 2 LAUREN in RCA in 01/09/2022.   - Continue home ASA, Plavix and statin  - Chest pain 3/4 after breakfast; EKG nonischemic. PPI, Tums, carafate added    Pulmonary nodules  Has stable bilateral pulmonary nodules/masses with broad differential per outpatient pulmonology notes. No pathology report as none of the nodules appeared to be safe for biopsy given high risk of PTX is high with many adjacent bulla. Plan working on promoting functional independence for the time being with possible addressing of nodules in the future, which is highly dependent upon his overall functionality.    Chronic pulmonary heart disease  Follows up with Pulmonary clinic in [place]. Last seen in December and was evaluated for pulmonary nodules. Deemed to have mild COPD per notes. Not on any maintenance  therapy.  - Continue albuterol inhaler     Type 2 diabetes mellitus with hyperglycemia, with long-term current use of insulin  See DKA  Glucoses remain erratic; Endocrine consulted and managing  - Levemir 8u qhs; changed to 6 units on 3/7  - Aspart 4-8u ac/hs pending BG values  - Endocrinology adjusting insulin doses daily; expecting current doses on 3/9 to be effective for transfer to [facility]    Hypertension associated with diabetes  Holding home meds due to normotension/hypotension  - was on losartan 25mg, Toprol xl 50mg, diltiazem 120mg at home  - Will restart losartan 25mg first if hypertension remains.   - See pAF        Active Hospital Problems    Diagnosis  POA    *Hypotension [I95.9]  Yes    Discharge planning issues [Z02.9]  Not Applicable    Pressure injury of buttock, unstageable [L89.300]  Yes    Palliative care status [Z51.5]  Not Applicable    Severe sepsis [A41.9, R65.20]  Yes    COVID-19 virus infection [U07.1]  Yes    Diabetic ketoacidosis with coma associated with type 2 diabetes mellitus [E11.11]  Yes     Chronic    Focal seizures [R56.9]  Yes     Chronic    Encephalopathy, metabolic [G93.41]  Yes    Paroxysmal atrial fibrillation [I48.0]  Yes     Chronic    BRENDAN (acute kidney injury) [N17.9]  Yes    Embolic stroke involving right middle cerebral artery [I63.411]  Yes     Chronic    Coronary artery disease involving native coronary artery of native heart with unstable angina pectoris [I25.110]  Yes     Chronic    Pulmonary nodules [R91.8]  Yes    Chronic pulmonary heart disease [I27.9]  Yes    Type 2 diabetes mellitus with hyperglycemia, with long-term current use of insulin [E11.65, Z79.4]  Not Applicable     Chronic    Hypertension associated with diabetes [E11.59, I15.2]  Yes     Chronic      Resolved Hospital Problems    Diagnosis Date Resolved POA    Diabetic acidosis [E11.10] 03/05/2022 Yes    Acute hypoxemic respiratory failure [J96.01] 03/05/2022 Yes       Inpatient  Medications Prescribed for Management of Current Problems:     Scheduled Meds:    acetaminophen  650 mg Oral TID    amiodarone  200 mg Oral Daily    apixaban  5 mg Oral BID    ascorbic acid (vitamin C)  500 mg Oral BID    aspirin  81 mg Oral Daily    atorvastatin  40 mg Oral Daily    clopidogreL  75 mg Oral Daily    diclofenac sodium  4 g Topical (Top) QID    gabapentin  200 mg Oral TID    insulin aspart U-100  6 Units Subcutaneous TIDWM    insulin detemir U-100  6 Units Subcutaneous QHS    lacosamide  100 mg Oral Q12H    LIDOcaine  1 patch Transdermal Q24H    magnesium oxide  400 mg Oral BID    multivitamin  1 tablet Oral Daily    pantoprazole  40 mg Oral BID    senna-docusate 8.6-50 mg  1 tablet Oral BID    sucralfate  1 g Oral QID (AC & HS)     Continuous Infusions:   As Needed: acetaminophen, albuterol **AND** [CANCELED] MDI Q4H, calcium carbonate, dextrose 10%, dextrose 10%, glucagon (human recombinant), glucose, glucose, insulin aspart U-100, loperamide, melatonin, ondansetron, oxyCODONE, sodium chloride 0.9%    VTE Risk Mitigation (From admission, onward)         Ordered     apixaban tablet 5 mg  2 times daily         02/19/22 2202     IP VTE HIGH RISK PATIENT  Once         02/19/22 2144     Place sequential compression device  Until discontinued         02/19/22 2144              I have assessed these finding virtually using telemed platform and with assistance of bedside nurse     The attending portion of this evaluation, treatment, and documentation was performed per Tiffany Awad MD via Telemedicine AudioVisual using the secure Vidyo software platform with 2 way audio/video. The provider was located off-site and the patient is located in the hospital. The aforementioned video software was utilized to document the relevant history and physical exam. Secure eCareManager software platform was used instead of Quofore for this visit.      Tiffany Awad MD  Department of Hospital  Medicine   [facility] ([place]

## 2022-03-10 NOTE — PROGRESS NOTES
Patient remained free of falls today. Patient knows to call when assistance needed. No distress noted. Call bell in reach. Bed in lowest position. Safety maintained. Will continue to monitor.

## 2022-03-10 NOTE — PLAN OF CARE
[facility]     Department of Hospital Medicine     [facility] [place] [address]     [phone] after hours  [phone] fax       NURSING HOME ORDERS    03/10/2022    Admit to Nursing Home:     Skilled Bed                                                  Diagnoses:  Active Hospital Problems    Diagnosis  POA    *Hypotension [I95.9]  Yes    Discharge planning issues [Z02.9]  Not Applicable    Pressure injury of buttock, unstageable [L89.300]  Yes    Palliative care status [Z51.5]  Not Applicable    Severe sepsis [A41.9, R65.20]  Yes    COVID-19 virus infection [U07.1]  Yes    Diabetic ketoacidosis with coma associated with type 2 diabetes mellitus [E11.11]  Yes     Chronic    Focal seizures [R56.9]  Yes     Chronic    Encephalopathy, metabolic [G93.41]  Yes    Paroxysmal atrial fibrillation [I48.0]  Yes     Chronic    BRENDAN (acute kidney injury) [N17.9]  Yes    Embolic stroke involving right middle cerebral artery [I63.411]  Yes     Chronic    Coronary artery disease involving native coronary artery of native heart with unstable angina pectoris [I25.110]  Yes     Chronic    Pulmonary nodules [R91.8]  Yes    Chronic pulmonary heart disease [I27.9]  Yes    Type 2 diabetes mellitus with hyperglycemia, with long-term current use of insulin [E11.65, Z79.4]  Not Applicable     Chronic    Hypertension associated with diabetes [E11.59, I15.2]  Yes     Chronic      Resolved Hospital Problems    Diagnosis Date Resolved POA    Diabetic acidosis [E11.10] 03/05/2022 Yes    Acute hypoxemic respiratory failure [J96.01] 03/05/2022 Yes         Allergies:  Review of patient's allergies indicates:   Allergen Reactions    Iodine      Other reaction(s): swelling  Other reaction(s): Itching  Other reaction(s): Rash       Vitals:       Every shift     Diet:  Diet diabetic [facility]; 2000 Calorie    Supplement:  1 can every three times a day with meals                          Type:     Glucerna          Acitivities:      - Up in a chair each morning as tolerated   - Ambulate with assistance to bathroom   - Scheduled walks once each shift (every 8 hours)   - May use walker, cane, or self-propelled wheelchair   - Weight bearing: WBAT to all extremities     LABS:  CBC, CMP, Mag, Phos twice weekly for 2 weeks then per facility protocol    Nursing Precautions:     - Aspiration precautions:             - Total assistance with meals            -  Upright 90 degrees before, during, and after meals             -  Suction at bedside          - Fall precautions per facility protocol   - Seizure precaution per facility protocol   - Decubitus precautions:        -  for positioning   - Pressure reducing foam mattress   - Turn patient every two hours. Use wedge pillows to anchor patient    CONSULTS:      Physical Therapy to evaluate and treat     Occupational Therapy to evaluate and treat     Speech Therapy  to evaluate and treat     Nutrition to evaluate and recommend diet    MISCELLANEOUS CARE:       Routine Skin for Bedridden Patients:  Apply moisture barrier cream to all    skin folds and wet areas in perineal area daily and after baths and                           all bowel movements.    Oxygen:  Oxygen at 2 L/min nasal canula to be used:  As needed for SOB. Notify provider if needed.    Wound Care:    Buttocks/gluteal- cleanse with sea clens wound cleanser, pat dry, apply triad ointment to wound bed/skin BID/prn cleansing.   Encourage frequent turning/repositioning.         DIABETES CARE:        Check blood sugar:      Fingerstick blood sugar AC and HS   Fingerstick blood sugar every 6 hours if unable to eat      Report CBG < 60 or > 400 to physician.      Medications: Discontinue all previous medication orders, if any. See new list below.    Current Discharge Medication List      START taking these medications    Details   albuterol (PROVENTIL/VENTOLIN HFA) 90  mcg/actuation inhaler Inhale 2 puffs into the lungs every 4 (four) hours as needed for Wheezing (Pt states he will take it on his own. MDI placed by bedside.). Rescue      diclofenac sodium (VOLTAREN) 1 % Gel Apply 4 g topically 3 (three) times daily. Apply to sacrun closed skin/buttocks      gabapentin (NEURONTIN) 100 MG capsule Take 2 capsules (200 mg total) by mouth 3 (three) times daily.  Qty: 180 capsule, Refills: 11      LIDOcaine (LIDODERM) 5 % Place 1 patch onto the skin once daily. Place patch to lower back. Leave on for 12 hours and remove for 12 hours.  Refills: 0      loperamide (IMODIUM) 2 mg capsule Take 1 capsule (2 mg total) by mouth 3 (three) times daily as needed for Diarrhea.  Refills: 0      magnesium oxide (MAG-OX) 400 mg (241.3 mg magnesium) tablet Take 1 tablet (400 mg total) by mouth 2 (two) times daily.  Refills: 0      melatonin (MELATIN) 3 mg tablet Take 2 tablets (6 mg total) by mouth nightly as needed for Insomnia.  Refills: 0      oxyCODONE (ROXICODONE) 5 MG immediate release tablet Take 1 tablet (5 mg total) by mouth every 6 (six) hours as needed for Pain.  Refills: 0    Comments: Quantity prescribed more than 7 day supply? No      sucralfate (CARAFATE) 1 gram tablet Take 1 tablet (1 g total) by mouth 3 (three) times daily before meals.         CONTINUE these medications which have CHANGED    Details   !! insulin aspart U-100 (NOVOLOG) 100 unit/mL (3 mL) InPn pen Inject 0-5 Units into the skin 4 (four) times daily with meals and nightly. **LOW CORRECTION DOSE**  Blood Glucose  mg/dL                                    151-200                0 unit                        201-250                1 units                      251-300                2 units                      301-350                3 units                     >350                     4 units                      Administer subcutaneously if needed at times designated by monitoring schedule.   DO NOT HOLD correction dose  "insulin for patients who are  NPO.  "HIGH ALERT MEDICATION" - Administer with meals or TF/TPN.  Refills: 0      !! insulin aspart U-100 (NOVOLOG) 100 unit/mL (3 mL) InPn pen Inject 6 Units into the skin 3 (three) times daily with meals.  Refills: 0      insulin glargine (LANTUS) 100 unit/mL injection Inject 6 Units into the skin every evening.  Qty: 5.4 mL, Refills: 3    Associated Diagnoses: Type 2 diabetes mellitus with hyperglycemia, with long-term current use of insulin       !! - Potential duplicate medications found. Please discuss with provider.      CONTINUE these medications which have NOT CHANGED    Details   amiodarone (PACERONE) 200 MG Tab Take 1 tablet (200 mg total) by mouth once daily.  Qty: 90 tablet, Refills: 3      apixaban (ELIQUIS) 5 mg Tab Take 1 tablet (5 mg total) by mouth 2 (two) times daily.  Qty: 60 tablet, Refills: 5      aspirin (ECOTRIN) 81 MG EC tablet Take 81 mg by mouth once daily.      atorvastatin (LIPITOR) 40 MG tablet Take 1 tablet (40 mg total) by mouth once daily.  Qty: 90 tablet, Refills: 3      BD ULTRA-FINE SHORT PEN NEEDLE 31 gauge x 5/16" Ndle 3 (three) times daily.      blood sugar diagnostic Strp 1 strip by Misc.(Non-Drug; Combo Route) route 2 (two) times a day.  Qty: 200 strip, Refills: 6    Associated Diagnoses: Type 2 diabetes mellitus with diabetic peripheral angiopathy without gangrene, with long-term current use of insulin      clopidogreL (PLAVIX) 75 mg tablet Take 1 tablet (75 mg total) by mouth once daily.  Qty: 30 tablet, Refills: 11      ergocalciferol (ERGOCALCIFEROL) 50,000 unit Cap Take 1 capsule (50,000 Units total) by mouth every 7 days.  Qty: 12 capsule, Refills: 3      lacosamide (VIMPAT) 100 mg Tab Take 1 tablet (100 mg total) by mouth every 12 (twelve) hours.  Qty: 60 tablet, Refills: 11      lancets (ONETOUCH ULTRASOFT LANCETS) Misc 1 lancet by Misc.(Non-Drug; Combo Route) route 2 (two) times a day.  Qty: 200 each, Refills: 6    Associated Diagnoses: " "Type 2 diabetes mellitus with diabetic peripheral angiopathy without gangrene, with long-term current use of insulin      losartan (COZAAR) 25 MG tablet Take 1 tablet (25 mg total) by mouth once daily.  Qty: 90 tablet, Refills: 3    Comments: .  Associated Diagnoses: Primary hypertension      metFORMIN (GLUCOPHAGE) 500 MG tablet Take 1 tablet (500 mg total) by mouth 2 (two) times daily with meals.  Qty: 180 tablet, Refills: 3    Associated Diagnoses: Type 2 diabetes mellitus with hyperglycemia, with long-term current use of insulin; Type 2 diabetes mellitus with stage 3 chronic kidney disease, with long-term current use of insulin      MULTIVITAMIN ORAL Take 1 tablet by mouth once daily.       nitroGLYCERIN (NITROSTAT) 0.4 MG SL tablet Place 1 tablet (0.4 mg total) under the tongue every 5 (five) minutes as needed for Chest pain.  Qty: 25 tablet, Refills: 11      pantoprazole (PROTONIX) 40 MG tablet Take 1 tablet (40 mg total) by mouth once daily.  Qty: 30 tablet, Refills: 11      pen needle, diabetic (PEN NEEDLE) 30 gauge x 5/16" Ndle 1 Units by Misc.(Non-Drug; Combo Route) route 3 (three) times daily.  Qty: 100 each, Refills: 3    Associated Diagnoses: Type 2 diabetes mellitus with hyperglycemia, with long-term current use of insulin; Type 2 diabetes mellitus with stage 3 chronic kidney disease, with long-term current use of insulin      polycarbophil (FIBERCON) 625 mg tablet Take 1 tablet by mouth once daily.       pulse oximeter (PULSE OXIMETER) device by Apply Externally route 2 (two) times a day. Use twice daily at 8 AM and 3 PM and [facility].  Qty: 1 each, Refills: 0    Comments: This is a NO CHARGE item.  Please override price to zero.  DO NOT PRINT.  NORMAL MODE e-PRESCRIBE ONLY.      senna-docusate 8.6-50 mg (PERICOLACE) 8.6-50 mg per tablet Take 1 tablet by mouth once daily.         STOP taking these medications       diltiaZEM (CARDIZEM CD) 120 MG Cp24 Comments:   Reason for " Stopping:         metoprolol succinate (TOPROL-XL) 50 MG 24 hr tablet Comments:   Reason for Stopping:         molnupiravir 200 mg capsule (EUA) Comments:   Reason for Stopping:                      Future Appointments   Date Time Provider Department Center   3/21/2022 11:00 AM Isidra Kumar MD [facility]   4/6/2022  1:00 PM Basim Guerrero MD [facility] MED [place]         _________________________________  Komal Huynh MD  03/10/2022

## 2022-03-10 NOTE — ASSESSMENT & PLAN NOTE
See DKA  Glucoses remain erratic; Endocrine consulted and managing  - Levemir 8u qhs; changed to 6 units on 3/7  - Aspart 4-8u ac/hs pending BG values  - Endocrinology adjusting insulin doses daily; expecting current doses on 3/9 to be effective for transfer to [facility]

## 2022-03-10 NOTE — PROGRESS NOTES
Pt being discharged to [facility] per MD orders.  VS stable, pt afebrile and no c/o pain at this time.  PIV access removed from L & R fa, dry gauze/coban placed to site, CDI.  Ride to be provided by [facility] transport.  All personal belongings packed and with pt at the bedside.  Report given to receiving nurse at [facility].

## 2022-03-10 NOTE — PROGRESS NOTES
[facility] ([place] [address])  Endocrinology  Progress Note    Admit Date: 2/19/2022     Reason for Consult: Management of T2DM, Hyperglycemia     Diabetes diagnosis year: 2010    Home Diabetes Medications:  Novolog 9 TIDWM + SSI; Levemir 20 units qd; Metformin 1000 mg BID    How often checking glucose at home? 1-3 x day   BG readings on regimen: 100-150's  Hypoglycemia on the regimen?  No  Missed doses on regimen?  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 History was not obtained from patient due to encephalopathy. Patient's daughter at bedside reports the patient was recently in the ED on 2/17 for a mechanical fall after being discharged from rehab the same day. CTH and cervical spine revealed  evolving late subacute infarct involving the right frontal lobe with questionable petechial hemorrhage. Vascular neurology evaluated the patient and cleared him for discharge from without any new interventions. He was also tested positive for COVID-19 and was discharged yesterday from the ED. He subsequently received one dose of sotrovimab infusion for COVID and was in a normal state of health until this morning when his daughter found him lethargic and barely responsive prompting her to bring him to the ED. She reports the patient had no complaints prior to developing his AMS. Of note, the patient was recently admitted to [facility] from 01/25 through 02/13 for AMS concerning for CVA, however he was treated for metabolic encephalopathy 2/2 to DKA/HHS, sepsis and BRENDAN. Upon arrival to the ED, he was placed on 6L NC,  "normotensive and tachycardic. Lactic 11.5, WBC 17.8, Glucose 1109, pH 7.17, Co2 15.6 HCO3 <5, AG unable to calculate. sCr 3.1. CXR with intersitial opacities. He received ~4L of IVF, vancomycin, zosyn, initiated on insulin shifted for hyperkalemia and calcium for cardioprotection. ECG without noticeable ischemic changes. CTH without new changes- discussed findings with radiology. Patient was admitted to the MICU for further management. Endocrine consulted to manage type 2 diabetes and hyperglycemia. Since admission patient has had fluctuations in BG control.             Interval HPI:   Overnight events:    Pt with erratic PO intake  BG uncontrolled yesterday, now at goal      Eatin%  Nausea: No  Hypoglycemia and intervention: No  Fever: No  TPN and/or TF: No  If yes, type of TF/TPN and rate: NA    BP (!) 161/78 (BP Location: Left arm, Patient Position: Lying)   Pulse 74   Temp 97.7 °F (36.5 °C) (Oral)   Resp 18   Ht 6' 2" (1.88 m)   Wt 76.4 kg (168 lb 6.9 oz)   SpO2 (!) 93%   BMI 21.63 kg/m²     Labs Reviewed and Include    No results for input(s): GLU, CALCIUM, ALBUMIN, PROT, NA, K, CO2, CL, BUN, CREATININE, ALKPHOS, ALT, AST, BILITOT in the last 24 hours.  Lab Results   Component Value Date    WBC 5.46 03/10/2022    HGB 11.2 (L) 03/10/2022    HCT 34.3 (L) 03/10/2022    MCV 87 03/10/2022     03/10/2022     No results for input(s): TSH, FREET4 in the last 168 hours.  Lab Results   Component Value Date    HGBA1C 11.5 (H) 2022       Nutritional status:   Body mass index is 21.63 kg/m².  Lab Results   Component Value Date    ALBUMIN 1.9 (L) 2022    ALBUMIN 2.0 (L) 2022    ALBUMIN 1.9 (L) 2022     No results found for: PREALBUMIN    Estimated Creatinine Clearance: 65.8 mL/min (based on SCr of 1 mg/dL).    Accu-Checks  Recent Labs     2247 22  1100 22  1551 22  1945 22  0805 22  1236 22  1331 22  1553 22  2150 " 03/10/22  0751   POCTGLUCOSE 171* 278* 347* 431* 206* 402* 441* 423* 315* 178*       Current Medications and/or Treatments Impacting Glycemic Control  Immunotherapy:    Immunosuppressants       None          Steroids:   Hormones (From admission, onward)                Start     Stop Route Frequency Ordered    02/23/22 1824  melatonin tablet 6 mg         -- Oral Nightly PRN 02/23/22 1724          Pressors:    Autonomic Drugs (From admission, onward)                None          Hyperglycemia/Diabetes Medications:   Antihyperglycemics (From admission, onward)                Start     Stop Route Frequency Ordered    03/09/22 1645  insulin aspart U-100 pen 6 Units         -- SubQ 3 times daily with meals 03/09/22 1400    03/08/22 2100  insulin detemir U-100 pen 6 Units         -- SubQ Nightly 03/08/22 1335    03/05/22 1145  insulin aspart U-100 pen 0-5 Units         -- SubQ Before meals, nightly and at 0100 PRN 03/05/22 1034            ASSESSMENT and PLAN    COVID-19 virus infection  Infection may elevate BG readings  Managed per primary team          Diabetic ketoacidosis with coma associated with type 2 diabetes mellitus  Now resolved.     Will continue to monitor in the event of BG excursions.       Encephalopathy, metabolic    Managed per primary team  Avoid hypoglycemia      Type 2 diabetes mellitus with hyperglycemia, with long-term current use of insulin  Endocrinology consulted for BG management.   BG goal 140-180    - Continue Levemir Flex Pen to 6 units nightly    - Novolog (aspart) insulin 6 Units SQ TIDWM with low dose correction insulin  - BG checks AC/HS/0200   -If  BG remain at goal today, can consider DC on current regimen      ** Please notify Endocrine for any change and/or advance in diet**      Discharge Planning:   TBD. Please notify endocrinology prior to discharge.            Rajeev Sanderson MD  Endocrinology  [facility] - Intensive Care ([place])

## 2022-03-10 NOTE — ASSESSMENT & PLAN NOTE
Has stable bilateral pulmonary nodules/masses with broad differential per outpatient pulmonology notes. No pathology report as none of the nodules appeared to be safe for biopsy given high risk of PTX is high with many adjacent bulla. Plan working on promoting functional independence for the time being with possible addressing of nodules in the future, which is highly dependent upon his overall functionality.

## 2022-03-10 NOTE — PT/OT/SLP PROGRESS
Occupational Therapy   Treatment    Name: Kamar Muñoz  MRN: [id number]  Admitting Diagnosis:  Hypotension       Recommendations:     Discharge Recommendations: nursing facility, skilled  Discharge Equipment Recommendations:  none  Barriers to discharge:  None    Assessment:     Kamar Muñoz is a 76 y.o. male with a medical diagnosis of Hypotension. Pt agreeable to participate but adamantly refusing to stay up in the chair stating that therapy puts him there and never returns. OT explained the importance of being OOB to prevent further debility and that the nursing staff are able to assist him back to bed as needed. Pt still declining OOB to chair. Performance deficits affecting function are weakness, impaired endurance, impaired self care skills, impaired functional mobilty, gait instability, impaired balance, decreased coordination, decreased safety awareness, impaired cognition.     Rehab Prognosis:  Good; patient would benefit from acute skilled OT services to address these deficits and reach maximum level of function.       Plan:     Patient to be seen 4 x/week to address the above listed problems via self-care/home management, therapeutic activities, therapeutic exercises  · Plan of Care Expires: 03/22/22  · Plan of Care Reviewed with: patient    Subjective     Pain/Comfort:  · Pain Rating 1: 0/10    Objective:     Communicated with: rn prior to session.  Patient found supine with telemetry, pulse ox (continuous) upon OT entry to room.    General Precautions: Standard, fall, droplet, airborne, contact   Orthopedic Precautions:N/A   Braces:    Respiratory Status: Room air     Occupational Performance:     Bed Mobility:    · Patient completed Rolling/Turning to Left with  supervision  · Patient completed Scooting/Bridging with stand by assistance  · Patient completed Supine to Sit with stand by assistance  · Patient completed Sit to Supine with stand by assistance     Functional  Mobility/Transfers:  · Patient completed Sit <> Stand Transfer with contact guard assistance  with  rolling walker   · Functional Mobility: Took a few sidesteps to the HOB CGA with a RW.    Activities of Daily Living:  · Pt declined.    [facility] 6 Click ADL: 17    Treatment & Education:  From EOB, pt completed BUE/LE therex (x 15 reps each) including:  - elbow flexion/extension  - resistive tricep extension  - straight arm raises  - hor Abd/Add  - lat rows    **Discussed OT POC and progress.    Patient left supine with all lines intact and call button in reachEducation:      GOALS:   Multidisciplinary Problems     Occupational Therapy Goals        Problem: Occupational Therapy Goal    Goal Priority Disciplines Outcome Interventions   Occupational Therapy Goal     OT, PT/OT Ongoing, Progressing    Description: Goals to be met by: 3/6/22     Patient will increase functional independence with ADLs by performing:    Feeding with [place]. MET 3/3  UE Dressing with Stand-by Assistance.  LE Dressing with Minimal Assistance.  Grooming while standing with Stand-by Assistance.  Toileting from toilet with Stand-by Assistance for hygiene and clothing management. MET 3/6 from toilet  Toilet transfer to toilet with Stand-by Assistance.                     Time Tracking:     OT Date of Treatment: 03/10/22  OT Start Time: 0919  OT Stop Time: 0942  OT Total Time (min): 23 min    Billable Minutes:Therapeutic Activity 10  Therapeutic Exercise 13    OT/JUAN: OT          3/10/2022

## 2022-03-10 NOTE — ASSESSMENT & PLAN NOTE
sCr maxed at 3.1, baseline 1.1-1.3. Likely prerenal in the setting of DKA. Improved with treatment of DKA.   Had worsening renal function when hypotensive from hypovolemia 3/5 that also improved with fluids  - Avoid nephrotoxins, renally dose meds  - resolved

## 2022-03-10 NOTE — SUBJECTIVE & OBJECTIVE
"Interval HPI:   Overnight events:    Pt with erratic PO intake  BG uncontrolled yesterday, now at goal      Eatin%  Nausea: No  Hypoglycemia and intervention: No  Fever: No  TPN and/or TF: No  If yes, type of TF/TPN and rate: NA    BP (!) 161/78 (BP Location: Left arm, Patient Position: Lying)   Pulse 74   Temp 97.7 °F (36.5 °C) (Oral)   Resp 18   Ht 6' 2" (1.88 m)   Wt 76.4 kg (168 lb 6.9 oz)   SpO2 (!) 93%   BMI 21.63 kg/m²     Labs Reviewed and Include    No results for input(s): GLU, CALCIUM, ALBUMIN, PROT, NA, K, CO2, CL, BUN, CREATININE, ALKPHOS, ALT, AST, BILITOT in the last 24 hours.  Lab Results   Component Value Date    WBC 5.46 03/10/2022    HGB 11.2 (L) 03/10/2022    HCT 34.3 (L) 03/10/2022    MCV 87 03/10/2022     03/10/2022     No results for input(s): TSH, FREET4 in the last 168 hours.  Lab Results   Component Value Date    HGBA1C 11.5 (H) 2022       Nutritional status:   Body mass index is 21.63 kg/m².  Lab Results   Component Value Date    ALBUMIN 1.9 (L) 2022    ALBUMIN 2.0 (L) 2022    ALBUMIN 1.9 (L) 2022     No results found for: PREALBUMIN    Estimated Creatinine Clearance: 65.8 mL/min (based on SCr of 1 mg/dL).    Accu-Checks  Recent Labs     22  0947 22  1100 22  1551 22  1945 22  0805 22  1236 22  1331 22  1553 22  2150 03/10/22  0751   POCTGLUCOSE 171* 278* 347* 431* 206* 402* 441* 423* 315* 178*       Current Medications and/or Treatments Impacting Glycemic Control  Immunotherapy:    Immunosuppressants       None          Steroids:   Hormones (From admission, onward)                Start     Stop Route Frequency Ordered    22 1824  melatonin tablet 6 mg         -- Oral Nightly PRN 22 1724          Pressors:    Autonomic Drugs (From admission, onward)                None          Hyperglycemia/Diabetes Medications:   Antihyperglycemics (From admission, onward)                Start  "    Stop Route Frequency Ordered    03/09/22 1645  insulin aspart U-100 pen 6 Units         -- SubQ 3 times daily with meals 03/09/22 1400    03/08/22 2100  insulin detemir U-100 pen 6 Units         -- SubQ Nightly 03/08/22 1335    03/05/22 1145  insulin aspart U-100 pen 0-5 Units         -- SubQ Before meals, nightly and at 0100 PRN 03/05/22 1034

## 2022-03-11 ENCOUNTER — PATIENT OUTREACH (OUTPATIENT)
Dept: ADMINISTRATIVE | Facility: CLINIC | Age: 77
End: 2022-03-11
Payer: MEDICARE

## 2022-03-11 PROBLEM — E44.0 MALNUTRITION OF MODERATE DEGREE: Status: ACTIVE | Noted: 2022-03-11

## 2022-03-11 LAB
POCT GLUCOSE: 123 MG/DL (ref 70–110)
POCT GLUCOSE: 218 MG/DL (ref 70–110)
POCT GLUCOSE: 265 MG/DL (ref 70–110)
POCT GLUCOSE: 274 MG/DL (ref 70–110)

## 2022-03-11 PROCEDURE — 97530 THERAPEUTIC ACTIVITIES: CPT

## 2022-03-11 PROCEDURE — 97535 SELF CARE MNGMENT TRAINING: CPT

## 2022-03-11 PROCEDURE — 97162 PT EVAL MOD COMPLEX 30 MIN: CPT

## 2022-03-11 PROCEDURE — 25000003 PHARM REV CODE 250: Performed by: NURSE PRACTITIONER

## 2022-03-11 PROCEDURE — 25000003 PHARM REV CODE 250: Performed by: HOSPITALIST

## 2022-03-11 PROCEDURE — 97165 OT EVAL LOW COMPLEX 30 MIN: CPT

## 2022-03-11 PROCEDURE — 11000004 HC SNF PRIVATE

## 2022-03-11 RX ORDER — ASCORBIC ACID 250 MG
500 TABLET ORAL 2 TIMES DAILY
Status: DISCONTINUED | OUTPATIENT
Start: 2022-03-11 | End: 2022-04-01 | Stop reason: HOSPADM

## 2022-03-11 RX ADMIN — DICLOFENAC SODIUM 4 G: 10 GEL TOPICAL at 03:03

## 2022-03-11 RX ADMIN — CLOPIDOGREL 75 MG: 75 TABLET, FILM COATED ORAL at 08:03

## 2022-03-11 RX ADMIN — ERGOCALCIFEROL 50000 UNITS: 1.25 CAPSULE ORAL at 08:03

## 2022-03-11 RX ADMIN — INSULIN ASPART 6 UNITS: 100 INJECTION, SOLUTION INTRAVENOUS; SUBCUTANEOUS at 12:03

## 2022-03-11 RX ADMIN — SUCRALFATE 1 G: 1 TABLET ORAL at 08:03

## 2022-03-11 RX ADMIN — GABAPENTIN 200 MG: 100 CAPSULE ORAL at 10:03

## 2022-03-11 RX ADMIN — THERA TABS 1 TABLET: TAB at 08:03

## 2022-03-11 RX ADMIN — PANTOPRAZOLE SODIUM 40 MG: 40 TABLET, DELAYED RELEASE ORAL at 08:03

## 2022-03-11 RX ADMIN — GABAPENTIN 200 MG: 100 CAPSULE ORAL at 03:03

## 2022-03-11 RX ADMIN — ASPIRIN 81 MG: 81 TABLET, COATED ORAL at 08:03

## 2022-03-11 RX ADMIN — APIXABAN 5 MG: 2.5 TABLET, FILM COATED ORAL at 10:03

## 2022-03-11 RX ADMIN — INSULIN ASPART 1 UNITS: 100 INJECTION, SOLUTION INTRAVENOUS; SUBCUTANEOUS at 10:03

## 2022-03-11 RX ADMIN — ATORVASTATIN CALCIUM 40 MG: 20 TABLET, FILM COATED ORAL at 08:03

## 2022-03-11 RX ADMIN — INSULIN DETEMIR 4 UNITS: 100 INJECTION, SOLUTION SUBCUTANEOUS at 10:03

## 2022-03-11 RX ADMIN — DICLOFENAC SODIUM 4 G: 10 GEL TOPICAL at 10:03

## 2022-03-11 RX ADMIN — Medication 400 MG: at 10:03

## 2022-03-11 RX ADMIN — SENNOSIDES AND DOCUSATE SODIUM 1 TABLET: 50; 8.6 TABLET ORAL at 10:03

## 2022-03-11 RX ADMIN — APIXABAN 5 MG: 2.5 TABLET, FILM COATED ORAL at 08:03

## 2022-03-11 RX ADMIN — Medication 400 MG: at 08:03

## 2022-03-11 RX ADMIN — LOSARTAN POTASSIUM 25 MG: 25 TABLET, FILM COATED ORAL at 08:03

## 2022-03-11 RX ADMIN — INSULIN ASPART 6 UNITS: 100 INJECTION, SOLUTION INTRAVENOUS; SUBCUTANEOUS at 05:03

## 2022-03-11 RX ADMIN — DICLOFENAC SODIUM 4 G: 10 GEL TOPICAL at 08:03

## 2022-03-11 RX ADMIN — SUCRALFATE 1 G: 1 TABLET ORAL at 12:03

## 2022-03-11 RX ADMIN — Medication 500 MG: at 10:03

## 2022-03-11 RX ADMIN — INSULIN ASPART 2 UNITS: 100 INJECTION, SOLUTION INTRAVENOUS; SUBCUTANEOUS at 05:03

## 2022-03-11 RX ADMIN — INSULIN ASPART 6 UNITS: 100 INJECTION, SOLUTION INTRAVENOUS; SUBCUTANEOUS at 08:03

## 2022-03-11 RX ADMIN — LACOSAMIDE 100 MG: 50 TABLET, FILM COATED ORAL at 10:03

## 2022-03-11 RX ADMIN — AMIODARONE HYDROCHLORIDE 200 MG: 200 TABLET ORAL at 08:03

## 2022-03-11 RX ADMIN — SUCRALFATE 1 G: 1 TABLET ORAL at 05:03

## 2022-03-11 RX ADMIN — INSULIN ASPART 3 UNITS: 100 INJECTION, SOLUTION INTRAVENOUS; SUBCUTANEOUS at 12:03

## 2022-03-11 RX ADMIN — GABAPENTIN 200 MG: 100 CAPSULE ORAL at 08:03

## 2022-03-11 RX ADMIN — LACOSAMIDE 100 MG: 50 TABLET, FILM COATED ORAL at 08:03

## 2022-03-11 NOTE — PROGRESS NOTES
"                                                        [facility]                                  Skilled Nursing Facility                   Progress Note     Admit Date: 3/10/2022  ALLIE TBD  Principal Problem:  Encephalopathy, metabolic   HPI obtained from patient interview and chart review     Chief Complaint: Establish Care/ Initial Visit     HPI:   Kamar Muñoz is a 76 year old male with PMHx of CAD s/p 2 LAUREN in RCA (Jan 2022), HTN, HLD, p. A. 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  Admission to SNF for secondary weakness debility, continued insulin adjustments.      [place] [facility] [place] with vomiting and altered mental status, found to be COVID positive. " Patient's daughter at bedside reports the patient was recently in the ED on 2/17 for a mechanical fall after being discharged from rehab the same day. CTH and cervical spine revealed evolving late subacute infarct involving the right frontal lobe with questionable petechial hemorrhage. Vascular neurology evaluated the patient and cleared him for discharge from without any new interventions. He was also tested positive for COVID-19 and was discharged yesterday from the ED. He subsequently received one dose of sotrovimab infusion for COVID and was in a normal state of health until this morning when his daughter found him lethargic and barely responsive prompting her to bring him to the ED. She reports the patient had no complaints prior to developing his AMS. Of note, the patient was recently [place] [facility] [place]13 for AMS concerning for CVA, however he was treated for metabolic encephalopathy 2/2 to DKA/HHS, sepsis and BRENDAN. In the ED, he was placed on 6L NC,  CXR with intersitial opacities. He received ~4L of IVF, vancomycin, zosyn, " "initiated on insulin shifted for hyperkalemia and calcium for cardioprotection. ECG without noticeable ischemic changes. CTH without new changes.  Patient was admitted to the MICU for further management.  Placed on remdesivir and broad spectrum IV abx in addition to insulin per DKA protocol. In MICU: Weaned supp O2 to room air; Transitioned to subq insulin; Improvement of AMS. BRENDAN improving gradually. Pt had discussion w/ family in MICU and transitioned from full code to DNR/DNI. Pt w/ episode of CP and hypoglycemia upon stepdown. Ischemic work-up largely unremarkable with trop down trending from admission. Detemir dosing adjusted from BID to qhs. He had additional hypoglycemic episode upon re-uptitration of long-acting insulin from 12 to 15. Dosing decreased to 13u as patient was hyperglycemic to 230s w/12u qhs. Worsening hyperglycemia to 270s- insulin dose modified to 3u TID wm, and detemir 14u qhs. Episode of abdominal pain and diarrhea 02/25- appeared to be 2/2 antibiotic use. Antibiotics stopped 02/25. Pt w/persistent diarrhea- c diff studies sent- loperamide stopped. Pt was found to have St 3 sacral pressure ulcer. Patient reportedly hypotensive on 3/5 and endorsing nausea, septic work up ordered but ultimately unrevealing. Patient's BP responded after 2.5L of fluid. Presume hypovolemia was the source of his hypotension."  Blood glucose levels continue to be difficult to treat.  Patient was followed by Endocrinology.    Today, patient noted to have 24 hour blood glucose range of 123-425.  He endorses a mild productive cough and decreased appetite.     Patient will be treated at [facility] with PT and OT to improve functional status and ability to perform ADLs.     Past Medical History: Patient has a past medical history of Arthritis, Coronary artery disease, Diabetes mellitus type II, Hyperlipidemia, Hypertension, Kidney stone, 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 (8/2/2012), STEMI involving right coronary " artery (1/9/2022), Type II or unspecified type diabetes mellitus with neurological manifestations, uncontrolled(250.62) (3/8/2013), and Urinary tract infection.    Past Surgical History: Patient has a past surgical history that includes Back surgery; Eye surgery; Shoulder open rotator cuff repair; renal stones; Hernia repair; Colonoscopy (N/A, 1/28/2019); Cataract extraction w/  intraocular lens implant (Right); and Left heart catheterization (Left, 1/9/2022).    Social History: Patient reports that he quit smoking about 39 years ago. He has a 37.50 pack-year smoking history. He has never used smokeless tobacco. He reports that he does not drink alcohol and does not use drugs.    Family History:  No family history to report    Allergies: Patient is allergic to iodine.    ROS  Constitutional: Negative for fever.  +decreased appetite   Eyes: Negative for blurred vision, double vision   Respiratory:  Negative for shortness of breath.  + mild productive cough   Cardiovascular: Negative for chest pain, palpitations, and leg swelling.   Gastrointestinal: Negative for abdominal pain, constipation, diarrhea, nausea, vomiting.   Genitourinary: Negative for dysuria, frequency   Musculoskeletal:  + generalized weakness. Negative for back pain and myalgias.   Skin: Negative for itching and rash.   Neurological: Negative for dizziness, headaches.   Psychiatric/Behavioral: Negative for depression. The patient is not nervous/anxious.      24 hour Vital Sign Range   Temp:  [97.5 °F (36.4 °C)-97.8 °F (36.6 °C)]   Pulse:  []   Resp:  [14-18]   BP: (110-158)/(64-75)   SpO2:  [92 %-95 %]     PEx  Constitutional: Patient appears debilitated.  No distress noted  HENT:   Head: Normocephalic and atraumatic.   Eyes: Pupils are equal, round  Neck: Normal range of motion. Neck supple.   Cardiovascular: Normal rate, regular rhythm and normal heart sounds.    Pulmonary/Chest: Effort normal and breath sounds are clear  Abdominal: Soft.  Bowel sounds are normal.   Musculoskeletal: Normal range of motion.   Neurological: Alert and oriented to person, place, and time.   Psychiatric: Normal mood and affect. Behavior is normal.   Skin: Skin is warm and dry. Full skin assessment completed.    WOUND  Date identified - POA- 3/10/2022  Location:  Sacrum and left buttocks  Type:  Partial thickness tissue loss  Stage (if pressure): II  Appearance:  Red, moist  2cm x 2cm x 0.1cm   Undermining/tunneling: No   Drainage:  None  Odor: none   Edges: Irregular edges,  Periwound:  Ecchymotic/excoriated  Progress:  Initial assessment      No results for input(s): GLUCOSE, NA, K, CL, CO2, BUN, CREATININE, MG in the last 24 hours.    Invalid input(s):  CALCIUM    No results for input(s): WBC, RBC, HGB, HCT, PLT, MCV, MCH, MCHC in the last 24 hours.      Assessment and Plan:     COVID-19 virus infection  Viral Pneumonia due to COVID-19  - COVID vaccinated with booster.   - Received 1 dose of sotrovimab infusion 02/18/2022  - Completed 5 days of remdesivir, had dexamethasone held initially due to DKA.  - Stable on room air  - End isolation on 3/9/2022  - continues to have mild productive cough    Type 2 diabetes mellitus with hyperglycemia, with long-term current use of insulin  - [place] [facility] DM presenting with metabolic encephalopathy in the setting of DKA with coma. Suspect patient developed DKA in the setting of sepsis/ COVID-19- DKA protocol completed and DKA resolved in MICU and Pt stepped down on subq insulin.  Endocrine followed   - Diabetic diet, Accu-Cheks AC/HS  - home regimen with Levemir 20 units daily, NovoLog 9 units TID WM, metformin 1000 mg BID  - change detemir to 4 units BID, continue aspart 6 units TID WM     Hypertension associated with diabetes  - home regimen with losartan 25mg, Toprol xl 50mg, diltiazem 120mg at home  - continue losartan 25mg daily    Paroxysmal atrial fibrillation  - home Metoprolol XL 50 mg daily, diltiazem   mg daily and amiodarone 200 mg daily, apixaban 5mg BID  - continue amiodarone 20 mg daily, apixaban 5 mg BID. Holding metoprolol due to hypotension/bradycardia, holding diltiazem due to hypotension     Coronary artery disease involving native coronary artery of native heart with unstable angina pectoris  HLD  - Hx of CAD s/p placement of 2 LAUREN in RCA in 01/09/2022.   - Continue home ASA, Plavix and statin  - Chest pain 3/4 after breakfast; EKG nonischemic. PPI, Tums, carafate added     Pulmonary nodules  - Has stable bilateral pulmonary nodules/masses with broad differential per outpatient pulmonology notes. No pathology report as none of the nodules appeared to be safe for biopsy given high risk of PTX is high with many adjacent bulla. Plan working on promoting functional independence for the time being with possible addressing of nodules in the future, which is highly dependent upon his overall functionality.     Chronic pulmonary heart disease  - Follows up with Pulmonary clinic in [place]. Last seen in December and was evaluated for pulmonary nodules. Deemed to have mild COPD per notes. Not on any maintenance therapy.  - Continue albuterol inhaler   - likely needs follow-up with Pulmonary after discharge from [facility]    Embolic stroke involving right middle cerebral artery  - Hx of CVA in Jan 2022.   - CT Head with evolving infarcts in right frontal and left cerebellar hemispheres.   - Has had issues with memory per family since CVA   - No concern for acute stroke this admit per discussion with Radiology.   - Continue home antiplatelets, statin and Eliquis    Focal seizures  - Continue home lancosamide     Pressure injury of buttock, unstageable  - Seen by wound care with [facility] started  - Continues to cause patient discomfort  - initiate wound care consult at [facility]    Vitamin-D deficiency  - continue ergocalciferol 1000 units weekly    Hypomagnesemia  - initiated magnesium oxide 400 mg BID     Peripheral  neuropathy  - continue gabapentin 200 mg TID    Malnutrition of moderate degree  - RD following, appreciate recommendations, continue supplements as ordered    Debility   - Continue with PT/OT for gait training and strengthening and restoration of ADL's   - Encourage mobility, OOB in chair, and early ambulation as appropriate  - Fall precautions   - Monitor for bowel and bladder dysfunction  - Monitor for and prevent skin breakdown and pressure ulcers  - Continue DVT prophylaxis with apixaban      Anticipate disposition:  Home with home health      Follow-up needed during SNF stay-    Appointment not to send patient to- Dr. Kumar (3/21)    Follow-up needed after discharge from SNF:     - PCP - 4/6  - pulmonary- needs to be scheduled- post COVID/COPD   - Endocrinology- needs to be scheduled- diabetes mellitus type 2      Future Appointments   Date Time Provider Department Center   3/21/2022 11:00 AM Isidra Kumar MD [facility]   4/6/2022  1:00 PM Basim Guerrero MD [facility]         I certify that SNF services are required to be given on an inpatient basis because Kamar Muñoz needs for skilled nursing care and/or skilled rehabilitation are required on a daily basis and such services can only practically be provided in a skilled nursing facility setting and are for an ongoing condition for which she received inpatient care in the hospital.     Total time of the visit 113 minutes [id number]  Non physical exam/ non charting time: 67 minutes   Description of non physical exam/non charting time: counseling patient on clinical conditions and therapies provided regarding recent COVID-19 infection, antihypertensive medication regimen, diabetic regimen and eating consistently with each meal, importance of changing position frequently, beginning of discharge planning, importance of compliance with PT/OT. Extensive chart review completed including all consultation notes.  All  pertinent laboratory and radiographical images reviewed.        Jazmín Zambrano NP  Department of Hospital Medicine   [facility]     DOS: 3/11/2022       Patient note was created using MModal Dictation.  Any errors in syntax or even information may not have been identified and edited on initial review prior to signing this note.

## 2022-03-11 NOTE — CONSULTS
[facility] - Skilled Nursing  Adult Nutrition  Consult Note    SUMMARY   Recommendations  Continue 2000 diabetic, add boost glucose TID, recommend Vit C, Patric BID,  Goals: PO to meet 85% of EEN/EPN with ONS by next RD follow up  Nutrition Goal Status: new  Communication of RD Recs: reviewed with physician    Assessment and Plan  Nutrition Problem:  Severe Protein-Calorie Malnutrition  Malnutrition in the context of Acute Illness/Injury    Related to (etiology):  pysiological demands in the presence of poor intake    Signs and Symptoms (as evidenced by):  Energy Intake: <75% of estimated energy requirement for > 1 month  Body Fat Depletion: mild depletion of orbitals ,moderate to tricep  Muscle Mass Depletion: mild and moderate depletion of temples, clavicle region, scapular region, interosseous muscle and lower extremities   Weight Loss: 20% x 1-2 months from 1/26 209# to 168# on 3/10       Interventions(treatment strategy):  Commercial beverage(amino acids) Patric BID  Commercial beverage( protein and calories) Boost glucose TID strawbery  Collaboration with other providers  Carbohydrate modified diet- 2000 calories   Multivitamin/mineral therapy- Vit D, MVI, recommend  Vit C,  Nutrition Education - diabetic diet    Nutrition Diagnosis Status:  New    Malnutrition Assessment 3/11/22     Skin (Micronutrient): wounds unhealed, dry, scaly  Teeth (Micronutrient): broken dentition  Neck/Chest (Micronutrient): muscle wasting, subcutaneous fat loss  Musculoskeletal/Lower Extremities: muscle wasting, subcutaneous fat loss       Weight Loss (Malnutrition): greater than 5% in 1 month  Energy Intake (Malnutrition): less than or equal to 50% for greater than or equal to 1 month   Orbital Region (Subcutaneous Fat Loss): moderate depletion  Upper Arm Region (Subcutaneous Fat Loss): severe depletion  Thoracic and Lumbar Region: moderate depletion   Mu-ism Region (Muscle Loss): mild depletion  Clavicle Bone Region (Muscle  "Loss): moderate depletion  Clavicle and Acromion Bone Region (Muscle Loss): moderate depletion  Scapular Bone Region (Muscle Loss): moderate depletion  Dorsal Hand (Muscle Loss): moderate depletion  Patellar Region (Muscle Loss): moderate depletion  Anterior Thigh Region (Muscle Loss): moderate depletion  Posterior Calf Region (Muscle Loss): mild depletion                 Reason for Assessment    Reason For Assessment: consult (sepsis,)  Diagnosis: infection/sepsis (s/p BRENDAN, DKA,AMS)  Relevant Medical History: COVID, pneumonia,CVA 1/22, AFIB, DM, HTN, HLD, COPD, CAD, seizure, pulmonary masses, R/O malignancy  Interdisciplinary Rounds: did not attend  General Information Comments: DNR, wife reports 30# weight loss and patient needing encouragement to eat. Chart review shows weight loss of 40# from 209 on 2/18 down to 168# for a 20% loss in 1-2 months. Sacral wound stage III, patient was in rehab after CVA and fell at home once discharged, back to hospital with sepsis, NFPE  Completed showing moderate to severe losses. Patient is asking for boost glucose chocolate flavor and agrees to Patric for his wound healing. He reports loss of taste during COVID with slight improvement.  Nutrition Discharge Planning: DC on diabetic diet, diabetic ONS    Nutrition Risk Screen    Nutrition Risk Screen: large or nonhealing wound, burn or pressure injury    Nutrition/Diet History    Patient Reported Diet/Restrictions/Preferences: diabetic diet  Spiritual, Cultural Beliefs, Cheondoism Practices, Values that Affect Care: no  Food Allergies: NKFA  Factors Affecting Nutritional Intake: decreased appetite    Anthropometrics    Temp: 97.5 °F (36.4 °C)  Height: 6' 2" (188 cm)  Height (inches): 74 in  Weight Method: Standard Scale  Weight: 77 kg (169 lb 12.1 oz)  Weight (lb): 169.76 lb  Ideal Body Weight (IBW), Male: 190 lb  % Ideal Body Weight, Male (lb): 89.35 %  BMI (Calculated): 21.8  BMI Grade: 18.5-24.9 - normal   "     Lab/Procedures/Meds  Pertinent Labs Reviewed: reviewed  Pertinent Labs Comments: PO4 2.1, Mg 1.5, Na 134, Ca 7.8, glucose 234, Hg 11.2, Hct 34.3, HgA1c 11.5  Pertinent Medications Reviewed: reviewed  Pertinent Medications Comments: senna-docusate, apixaban, amiodarone, gabapentin, pantoprazole, MVI, insulin, ASA, statin, Vit D,       Estimated/Assessed Needs  Weight Used For Calorie Calculations: 77 kg (169 lb 12.1 oz)  Energy Calorie Requirements (kcal): 1848  Energy Need Method: Big Pool-St Jeor (x 1.25(PAL))  Protein Requirements: 92g-116g  Weight Used For Protein Calculations: 77 kg (169 lb 12.1 oz) (x 1.2-1.5 g/kg)  Fluid Requirements (mL): 1948 or per MD  Estimated Fluid Requirement Method: RDA Method  RDA Method (mL): 1848  CHO Requirement: 243g      Nutrition Prescription Ordered    Current Diet Order: 2000 diabetic  Nutrition Order Comments: PO 50%  Oral Nutrition Supplement: Boost glucose TID    Evaluation of Received Nutrient/Fluid Intake  I/O: no data  Energy Calories Required: not meeting needs  Protein Required: not meeting needs  Fluid Required: meeting needs  Comments: LBM 3/9  Tolerance: tolerating  % Intake of Estimated Energy Needs: 50 - 75 %  % Meal Intake: 25 - 50 %    Nutrition Risk  Level of Risk/Frequency of Follow-up: low (one time per week)     Monitor and Evaluation  Food and Nutrient Intake: food and beverage intake  Food and Nutrient Adminstration: diet order  Knowledge/Beliefs/Attitudes: beliefs and attitudes  Anthropometric Measurements: weight  Biochemical Data, Medical Tests and Procedures: glucose/endocrine profile, electrolyte and renal panel, gastrointestinal profile, inflammatory profile  Nutrition-Focused Physical Findings: overall appearance       Nutrition Follow-Up    RD Follow-up?: Yes

## 2022-03-11 NOTE — PLAN OF CARE
Problem: Adult Inpatient Plan of Care  Goal: Plan of Care Review  Outcome: Ongoing, Progressing  Goal: Patient-Specific Goal (Individualized)  Outcome: Ongoing, Progressing  Goal: Absence of Hospital-Acquired Illness or Injury  Outcome: Ongoing, Progressing  Goal: Optimal Comfort and Wellbeing  Outcome: Ongoing, Progressing     Problem: Diabetes Comorbidity  Goal: Blood Glucose Level Within Targeted Range  Outcome: Ongoing, Progressing     Problem: Adjustment to Illness (Sepsis/Septic Shock)  Goal: Optimal Coping  Outcome: Ongoing, Progressing

## 2022-03-11 NOTE — PT/OT/SLP EVAL
Occupational Therapy   Evaluation / Treatment    Name: Kamar Muñoz  MRN: [id number]  Admit Date: 3/10/2022  Admitting Diagnosis: Encephalopathy, Metabolic  Recent Surgeries: LEFT HEART CATHETERIZATION    General Precautions: Standard, fall   Orthopedic Precautions:N/A   Braces: N/A     Recommendations:     Discharge Recommendations: home health OT  Level of Assistance Recommended: Intermittent assistance   Discharge Equipment Recommendations:  bedside commode, walker, rolling, wheelchair (W/C with 18x16 seat, swing away desk length arm rests and swing away fot rests with heel loops.)  Barriers to discharge:  Decreased caregiver support (Pt's children work during the day.)    Assessment:     Kamar Muñoz is a 76 y.o. male with a medical diagnosis of <principal problem not specified> .  He remains limited in performance of self-care , functional mobility and ADLs and currently not performing tasks at [facility] . Currently presenting with performance deficits including  weakness, impaired endurance, impaired self care skills, impaired functional mobilty, gait instability, impaired balance, decreased coordination, decreased lower extremity function, decreased upper extremity function, pain, impaired cardiopulmonary response to activity.   Pt tolerated Tx without incident but requires assist to perform self care tasks, functional mobility and functional transfers .  He would continue to benefit from OT intervention to further his functional (I)ce and safety.     Rehab Potential is good    Activity Tolerance: Good    Plan:     Patient to be seen 6 x/week to address the above listed problems via self-care/home management, therapeutic activities, therapeutic exercises  · Plan of Care Expires: 04/11/22  · Plan of Care Reviewed with: patient    Subjective     Chief Complaint: Pt reporting pain in low back when sitting .  Patient/Family Comments/goals: Pt wants to return to [facility]    Occupational Profile:  Living  Environment: Pt lives with hs wife in a single story home with 1 CORINNA. He has a walk in shower with small step over and has a grab bar and shower seat in place. Pt has a STD height toilet .  Previous level of function: Pt was (I) with self care and functional mobility PTA  Roles and Routines:  and community dweller.  Equipment Used at Home:   Pt's wife has DME but he does not.   Assistance upon Discharge: Pt's has no anticipated help during the day time working hours as his children work during the day.    Pain/Comfort:  · Pain Rating 1: 7/10  · Location - Side 1: Bilateral  · Location - Orientation 1: lower  · Location 1: back  · Pain Addressed 1: Pre-medicate for activity, Reposition, Distraction, Cessation of Activity  · Pain Rating Post-Intervention 1: 7/10    Patients cultural, spiritual, Alevism conflicts given the current situation: no    Objective:     Communicated with: nurse prior to session.  Patient found supine with  (no lines) upon OT entry to room.    Occupational Performance:     Eating   Assistance Needed Set-up / clean-up   Physical Assistance Level No physical assistance   CARE Score - Eating 5   Oral Hygiene   Assistance Needed Set-up / clean-up   Physical Assistance Level No physical assistance  (with grooming performed seated sinkside.)   CARE Score - Oral Hygiene 5   Toileting Hygiene   Assistance Needed Physical assistance   Physical Assistance Level 26%-50%  (with (A) to clean rear, to steady when standing and to pull clothing up .)   CARE Score - Toileting Hygiene 3   Shower/Bathe Self   Assistance Needed Physical assistance   Physical Assistance Level 26%-50%  (sponge bathing seated on BSC with RW to steady when standing to perform serina care.)   CARE Score - Shower/Bathe Self 3   Upper Body Dressing   Assistance Needed Incidental touching   Physical Assistance Level 25% or less  (donning pullover shirt pulling over head.)   CARE Score - Upper Body Dressing 3   Lower Body Dressing    Assistance Needed Physical assistance   Physical Assistance Level 26%-50%  (with (A) to thread 2nd LE into pant leg , to steady when standing and to pul pants up in back with RW to steady..)   CARE Score - Lower Body Dressing 3   Putting On/Taking Off Footwear   Assistance Needed Verbal cues   Physical Assistance Level No physical assistance  (Pt using figure 4 method to perform task.)   CARE Score - Putting On/Taking Off Footwear 4   Roll Left and Right   Assistance Needed Incidental touching   Physical Assistance Level   (with HOB flat)   CARE Score - Roll Left and Right 4   Sit to Lying   Assistance Needed Incidental touching   Physical Assistance Level   (with HOB flat)   CARE Score - Sit to Lying 4   Lying to Sitting on Side of Bed   Assistance Needed Incidental touching   Physical Assistance Level   (with HOB flat)   CARE Score - Lying to Sitting on Side of Bed 4   Sit to Stand   Assistance Needed Incidental touching   Physical Assistance Level   (to RW)   CARE Score - Sit to Stand 4   Chair/Bed-to-Chair Transfer   Assistance Needed Incidental touching   Physical Assistance Level   (with RW to perform SPT.)   CARE Score - Chair/Bed-to-Chair Transfer 4   Toilet Transfer   Assistance Needed Incidental touching   Physical Assistance Level   (with RW to perform SPT.)   CARE Score - Toilet Transfer 4     Cognitive/Visual Perceptual:  Cognitive/Psychosocial Skills:     -       Oriented to: Person, Place, Time and Situation   -       Follows Commands/attention:Follows multistep  commands  -       Communication: clear/fluent  -       Memory: No Deficits noted  -       Safety awareness/insight to disability: intact   -       Mood/Affect/Coping skills/emotional control: Appropriate to situation  Visual/Perceptual:      -Intact .    Physical Exam:  Balance: -  Pt demonstrated good functional sitting balance as noted when performing self care tasks.          Pt presents with Fair minus functional standing with RW and  CGA required for safety.      Postural examination/scapula alignment:    -       Rounded shoulders  Skin integrity: Visible skin intact  Edema:  None noted  Sensation:    -       Intact  Motor Planning: -       WFLs  Dominant hand: -       Right  Upper Extremity Range of Motion:     -       Right Upper Extremity: WFL  -       Left Upper Extremity: WFL  Upper Extremity Strength:    -       Right Upper Extremity: WFL  -       Left Upper Extremity: WFL   Strength:    -       Right Upper Extremity: WFL  -       Left Upper Extremity: WFL  Fine Motor Coordination:    -       Intact    AMPAC 6 Click ADL:  AMPAC Total Score: 16    Treatment & Education:  Pt edu on role of OT, POC, safety when performing self care tasks , benefit of performing OOB activity, and safety when performing functional transfers and mobility.  - White board updated  - Self care tasks completed-- as noted above   Education:    Patient left supine in bed secondary to persisting back pain with call button in reach    GOALS:   Multidisciplinary Problems     Occupational Therapy Goals        Problem: Occupational Therapy Goal    Goal Priority Disciplines Outcome Interventions   Occupational Therapy Goal     OT, PT/OT Ongoing, Progressing    Description: Goals to be met by: 3/31/22     Patient will increase functional independence with ADLs by performing:    UE Dressing with Modified [place].  LE Dressing with Set-up Assistance.  Grooming while seated at sink with Modified [place].  Toileting from toilet or BSC with Supervision for hygiene and clothing management.   Bathing from sitting on shower seat with Supervision.  Supine to sit with Modified [place].  Stand pivot transfers with Modified [place] using A/D as needed..  Toilet transfer to toilet or BSC with Modified [place] using A/D as needed  Upper extremity exercise using UBE with mod resistance for 10 minutes to increased functional   endurance to perform functional  tasks and mobility.       Caregiver will be educated on level of assist required to safely perform self care tasks and functional transfers..                     History:     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 8/2/2012    STEMI involving right coronary artery 1/9/2022    Type II or unspecified type diabetes mellitus with neurological manifestations, uncontrolled(250.62) 3/8/2013    Urinary tract infection        Past Surgical History:   Procedure Laterality Date    BACK SURGERY      CATARACT EXTRACTION W/  INTRAOCULAR LENS IMPLANT Right     Per Dr Romero note 11/2018    COLONOSCOPY N/A 1/28/2019    Procedure: COLONOSCOPY Suprep;  Surgeon: Anh Johnson MD;  Location: [facility] ENDO;  Service: Endoscopy;  Laterality: N/A;    EYE SURGERY      HERNIA REPAIR      LEFT HEART CATHETERIZATION Left 1/9/2022    Procedure: CATHETERIZATION, HEART, LEFT;  Surgeon: Will Hurst III, MD;  Location: [facility] CATH LAB/EP;  Service: Cardiology;  Laterality: Left;    renal stones      SHOULDER OPEN ROTATOR CUFF REPAIR         Time Tracking:     OT Date of Treatment: 03/11/22  OT Start Time: 0900  OT Stop Time: 1000  OT Total Time (min): 60 min    Billable Minutes:Evaluation 20  Self Care/Home Management 40    3/11/2022

## 2022-03-11 NOTE — PLAN OF CARE
Problem: Physical Therapy Goal  Goal: Physical Therapy Goal  Description: Goals to be met by: 3/25/22    Patient will increase functional independence with mobility by performing:    . Supine to sit with [place]  . Sit to supine with [place]  . Rolling to Left and Right with [place].  . Sit to stand transfer with Supervision  . Bed to chair transfer with Supervision using No Assistive Device  . Gait  x 150 feet with Supervision using Rolling Walker.   . Wheelchair propulsion x150 feet with Supervision using bilateral uppper extremities  . Ascend/descend 4 stair with bilateral Handrails Contact Guard Assistance using No Assistive Device.   . Ascend/Descend 4 inch curb step with Contact Guard Assistance using Rolling Walker.  . Retrieve object off floor in standing with RW and reacher and SBA.    Outcome: Ongoing, Progressing

## 2022-03-11 NOTE — PLAN OF CARE
[facility] - Skilled Nursing  Initial Discharge Assessment       Primary Care Provider: Basim Guerrero MD    Admission Diagnosis: Sepsis [A41.9]    Admission Date: 3/10/2022  Expected Discharge Date:     Discharge Barriers Identified: None    Payor: HU[employer] MANAGED MEDICARE / Plan: [employer] MEDICARE HMO / Product Type: Capitation /     Extended Emergency Contact Information  Primary Emergency Contact: Sampson,Julie  Mobile Phone: [phone]  Relation: Daughter  Preferred language: English   needed? No  Secondary Emergency Contact: Aimee Muñoz  Address: [address] [place] [address]           [place] [address] [facility]  Home Phone: [phone]  Mobile Phone: [phone]  Relation: Spouse    Discharge Plan A: Home Health  Discharge Plan B: [facility]      [id number] [place] - [address] AT [facility]  [address] [place] [address]  Phone: [phone] Fax: [phone]    [facility]  [address] [facility] [address]  [place] [address]  Phone: [phone] Fax: [phone]      Initial Assessment (most recent)     Adult Discharge Assessment - 03/11/22 0917        Discharge Assessment    Assessment Type Discharge Planning Assessment     Confirmed/corrected address, phone number and insurance Yes     Confirmed Demographics Correct on Facesheet     Source of Information health record     Communicated ALLIE with patient/caregiver Date not available/Unable to determine     Reason For Admission PT/OT and Skilled Nursing     Lives With spouse     Facility Arrived From: [facility]     Do you expect to return to your current living situation? Yes     Do you have help at home or someone to help you manage your care at home? Yes     Who are your caregiver(s) and their phone number(s)? Adult child Marisa Sampson [phone]     Prior to hospitilization cognitive status: Alert/Oriented     Current cognitive status:  Alert/Oriented   With some confusion    Walking or Climbing Stairs Difficulty ambulation difficulty, requires equipment     Mobility Management Rolling walker     Dressing/Bathing Difficulty none     Equipment Currently Used at Home walker, rolling     Readmission within 30 days? No     Patient currently being followed by outpatient case management? No     Do you currently have service(s) that help you manage your care at home? No     Do you take prescription medications? Yes     Do you have prescription coverage? Yes     Coverage [employer]     Do you have any problems affording any of your prescribed medications? Yes     Is the patient taking medications as prescribed? yes     Who is going to help you get home at discharge? Adult child Marisa Sampson [phone]     How do you get to doctors appointments? family or friend will provide     Are you on dialysis? No     Do you take coumadin? Yes     Discharge Plan A Home Health     Discharge Plan B Home Health     DME Needed Upon Discharge  --   TBD    Discharge Plan discussed with: Patient;Adult children     Discharge Barriers Identified None        Relationship/Environment    Name(s) of Who Lives With Patient Pt lives with his wife Aimee Muñoz [phone]               Pt lives with his wife, who has also been recently hospitalized. Pt's  Adult child Marisa Sampson [phone] will assist him upon discharge. Pt uses a rolling walker in the home. Pt prefers [facility].    Pt states he is NOT on Coumadin, and he's not on the drug at the present time, however, a previous assessment stated he was on Coumadin. Verify prior to discharge.     SALVATORE Adames, SHANNON  [facility]  [id number]

## 2022-03-11 NOTE — PLAN OF CARE
Continue 2000 diabetic, add boost glucose TID, recommend Vit C, Patric BID,  Goals: PO to meet 85% of EEN/EPN with ONS by next RD follow up  Nutrition Goal Status: new  Communication of RD Recs: reviewed with physician     Assessment and Plan  Nutrition Problem:  Severe Protein-Calorie Malnutrition  Malnutrition in the context of Acute Illness/Injury     Related to (etiology):  pysiological demands in the presence of poor intake     Signs and Symptoms (as evidenced by):  Energy Intake: <75% of estimated energy requirement for > 1 month  Body Fat Depletion: mild depletion of orbitals ,moderate to tricep  Muscle Mass Depletion: mild and moderate depletion of temples, clavicle region, scapular region, interosseous muscle and lower extremities   Weight Loss: 20% x 1-2 months from 1/26 209# to 168# on 3/10         Interventions(treatment strategy):  Commercial beverage(amino acids) Patric BID  Commercial beverage( protein and calories) Boost glucose TID strawbery  Collaboration with other providers  Carbohydrate modified diet- 2000 calories   Multivitamin/mineral therapy- Vit D, MVI, recommend  Vit C,     Nutrition Diagnosis Status:  New

## 2022-03-11 NOTE — PLAN OF CARE
[facility] - Skilled Nursing  Discharge Final Note    Primary Care Provider: Basim Guerrero MD    Expected Discharge Date:     Final Discharge Note (most recent)       Final Note - 03/11/22 0845          Final Note    Assessment Type Final Discharge Note (P)      Anticipated Discharge Disposition Skilled Nursing Facility (P)    [facility]                    Important Message from Medicare      Marley Gregg LMSW   - [facility]  Ext. [phone]

## 2022-03-11 NOTE — PT/OT/SLP EVAL
Physical Therapy Evaluation    Patient Name:  Kamar Muñoz   MRN:  [id number]  Admit Date: 3/10/2022  Admitting Diagnosis: Hypotension  Recent Surgeries:N/A    General Precautions: Standard, fall (previous COVID pt.)   Orthopedic Precautions:N/A   Braces: N/A     Recommendations:     Discharge Recommendations:  home health PT   Level of Assistance Recommended: Intermittent assistance   Discharge Equipment Recommendations: wheelchair, walker, rolling, bedside commode   Barriers to discharge: Decreased caregiver support (pt's kids work during the day)    Assessment:     Kamar Muñoz is a 76 y.o. male admitted with a medical diagnosis of Hypotension.  Performance deficits affecting function  weakness, impaired endurance, impaired self care skills, impaired functional mobilty, gait instability, impaired balance, decreased lower extremity function, impaired cardiopulmonary response to activity .pt was Mod I with all functional mobility PTA and now requires Gema/CGA for transfers and GT. Pt's wife is also a current resident at [facility]. Pt stated that all his children work during the day so both him and his wife will have limited assistance at home. Pt will therefore benefit from continued snf rehab to help pt gain (I) prior to d/c from the unit.    Rehab Potential is good     Activity Tolerance: Good    Plan:     Patient to be seen 6 x/week to address the above listed problems via gait training, therapeutic exercises, neuromuscular re-education, wheelchair management/training     Plan of Care Expires: 04/10/22  Plan of Care Reviewed with: patient    Subjective     Chief Complaint: back pain  Patient/Family Comments/goals: to gain (I)  Pain/Comfort:  Pain Rating 1: 7/10  Location - Side 1: Bilateral  Location - Orientation 1: lower  Location 1: back  Pain Addressed 1: Pre-medicate for activity, Reposition, Distraction  Pain Rating Post-Intervention 1: 7/10    Living Environment:   Pt lives with wife, who is currently  a resident at [facility]. Pt lives in a [facility] with threshold to enter. Pt has a walk in shower with built in seat. Pt was driving PTA.    Prior to admission, patients level of function was Mod I using a RW.  Equipment used at home:  RW, shower seat.  DME owned (not currently used): none.  Upon discharge, patient will have assistance from kids bu minimal because they work during the day. Pt is thinking of hiring a sitter for both him and his wife.    Patients cultural, spiritual, Roman Catholic conflicts given the current situation: no    Objective:     Communicated with pt's nurse prior to session.  Patient found supine with  (no active lines)  upon PT entry to room.    Exams:  · Cognitive Exam:  Patient is oriented to Person, Place, Time and Situation  · Gross Motor Coordination:  WFL  · Sensation:    · -       Intact  · Skin Integrity/Edema:      · -       Skin integrity: Visible skin intact  · RLE ROM: WFL  · RLE Strength: grossly 4/5  · LLE ROM: WFL  · LLE Strength: grossly 4/5    Functional Mobility:     03/11/22 0800   Prior Functioning: Everyday Activities   Self Care Independent   Indoor Mobility (Ambulation) Independent   Stairs Not applicable   Functional Cognition Independent   Prior Device Use Walker   Roll Left and Right   Assistance Needed Incidental touching   Comment HOB flat and no rail   CARE Score - Roll Left and Right 4   Sit to Lying   Assistance Needed Incidental touching   Comment HOB flat and no rail   CARE Score - Sit to Lying 4   Lying to Sitting on Side of Bed   Assistance Needed Incidental touching   Comment HOB flat and no rail   CARE Score - Lying to Sitting on Side of Bed 4   Sit to Stand   Assistance Needed Incidental touching   Comment with RW   CARE Score - Sit to Stand 4   Chair/Bed-to-Chair Transfer   Assistance Needed Incidental touching   Comment SPT with no AD   CARE Score - Chair/Bed-to-Chair Transfer 4   Chair/Bed-to-Chair Transfer Discharge Goal   Discharge Goal 5   Car Transfer    Reason if not Attempted Environmental limitations   CARE Score - Car Transfer 10   Walk 10 Feet   Assistance Needed Incidental touching   Comment pt with FFT, decrease step length and decrease cristofer   CARE Score - Walk 10 Feet 4   Walk 50 Feet with Two Turns   Assistance Needed Incidental touching   Comment pt with FFT, decrease step length and decrease cristofer   CARE Score - Walk 50 Feet with Two Turns 4   Walk 150 Feet   Comment pt. became fatgued and needed to sit at 54ft   Reason if not Attempted Safety concerns   CARE Score - Walk 150 Feet 88   Walking 10 Feet on Uneven Surfaces   Comment pt with 7/10 back pain and not able to initiate. pt refused to take any pain meds   Reason if not Attempted Safety concerns   CARE Score - Walking 10 Feet on Uneven Surfaces 88   1 Step (Curb)   Comment pt with 7/10 back pain and not able to initiate. pt refused to take any pain meds   Reason if not Attempted Safety concerns   CARE Score - 1 Step (Curb) 88   4 Steps   Comment pt with 7/10 back pain and not able to initiate. pt refused to take any pain meds   Reason if not Attempted Safety concerns   CARE Score - 4 Steps 88   12 Steps   Comment pt with 7/10 back pain and not able to initiate. pt refused to take any pain meds   Reason if not Attempted Safety concerns   CARE Score - 12 Steps 88   Picking Up Object   Comment pt with 7/10 back pain and not able to initiate. pt refused to take any pain meds   Reason if not Attempted Safety concerns   CARE Score - Picking Up Object 88   Uses a Wheelchair/Scooter?   Uses a Wheelchair/Scooter? Yes   Wheel 50 Feet with Two Turns   Physical Assistance Level 25% or less   Comment using B U/E's   CARE Score - Wheel 50 Feet with Two Turns 3   Type of Wheelchair/Scooter Manual   Wheel 150 Feet   Physical Assistance Level 51%-75%   Comment d.t increasing back pain   CARE Score - Wheel 150 Feet 2   Type of Wheelchair/Scooter Manual       Therapeutic Activities and Exercises:   VS supine:  90HR; 119/67 B/P  VS seated at the EOB after sitting for ~ 3mins: 100HR; 110/64 B/P; 95% O2 after pt asked to take some deep breaths (pt previous COVID pt)  Extensive time with education, additional transfers and taking Vital signs    Education:   Patient provided with daily orientation and goals of this PT session.   Patient educated to transfer to bedside chair/bedside commode/bathroom with RN/PCT present.    Patient educated on Fall risk, home safety and plan of care by explanation.    Patient Verbalized Understanding.   Time provided for therapeutic counseling and discussion of current health disposition. All questions answered to satisfaction, within scope of PT practice; voiced no other concerns. White board updated in patient's room, RN notified of session.        AM-PAC 6 CLICK MOBILITY  Total Score:17     Patient left up in chair with call button in reach.    GOALS:   Multidisciplinary Problems     Physical Therapy Goals        Problem: Physical Therapy Goal    Goal Priority Disciplines Outcome Goal Variances Interventions   Physical Therapy Goal     PT, PT/OT Ongoing, Progressing     Description: Goals to be met by: 3/25/22    Patient will increase functional independence with mobility by performing:    . Supine to sit with [place]  . Sit to supine with [place]  . Rolling to Left and Right with [place].  . Sit to stand transfer with Supervision  . Bed to chair transfer with Supervision using No Assistive Device  . Gait  x 150 feet with Supervision using Rolling Walker.   . Wheelchair propulsion x150 feet with Supervision using bilateral uppper extremities  . Ascend/descend 4 stair with bilateral Handrails Contact Guard Assistance using No Assistive Device.   . Ascend/Descend 4 inch curb step with Contact Guard Assistance using Rolling Walker.  . Retrieve object off floor in standing with RW and reacher and SBA.                     History:     Past Medical History:   Diagnosis Date     Arthritis     Coronary artery disease     Diabetes mellitus type II     Hyperlipidemia     Hypertension     Kidney stone     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 8/2/2012    STEMI involving right coronary artery 1/9/2022    Type II or unspecified type diabetes mellitus with neurological manifestations, uncontrolled(250.62) 3/8/2013    Urinary tract infection        Past Surgical History:   Procedure Laterality Date    BACK SURGERY      CATARACT EXTRACTION W/  INTRAOCULAR LENS IMPLANT Right     Per Dr Romero note 11/2018    COLONOSCOPY N/A 1/28/2019    Procedure: COLONOSCOPY Suprep;  Surgeon: Anh Johnson MD;  Location: [facility] ENDO;  Service: Endoscopy;  Laterality: N/A;    EYE SURGERY      HERNIA REPAIR      LEFT HEART CATHETERIZATION Left 1/9/2022    Procedure: CATHETERIZATION, HEART, LEFT;  Surgeon: Will Hurst III, MD;  Location: [facility] CATH LAB/EP;  Service: Cardiology;  Laterality: Left;    renal stones      SHOULDER OPEN ROTATOR CUFF REPAIR         Time Tracking:     PT Received On: 03/11/22  PT Start Time: 0818     PT Stop Time: 0910  PT Total Time (min): 52 min     Billable Minutes: Evaluation 25mins and Therapeutic Activity 27mins      03/11/2022

## 2022-03-11 NOTE — PLAN OF CARE
Problem: Occupational Therapy Goal  Goal: Occupational Therapy Goal  Description: Goals to be met by: 3/31/22     Patient will increase functional independence with ADLs by performing:    UE Dressing with Modified [place].  LE Dressing with Set-up Assistance.  Grooming while seated at sink with Modified [place].  Toileting from toilet or BSC with Supervision for hygiene and clothing management.   Bathing from sitting on shower seat with Supervision.  Supine to sit with Modified [place].  Stand pivot transfers with Modified [place] using A/D as needed..  Toilet transfer to toilet or BSC with Modified [place] using A/D as needed  Upper extremity exercise using UBE with mod resistance for 10 minutes to increased functional   endurance to perform functional tasks and mobility.       Caregiver will be educated on level of assist required to safely perform self care tasks and functional transfers..    Outcome: Ongoing, Progressing

## 2022-03-12 LAB
POCT GLUCOSE: 297 MG/DL (ref 70–110)
POCT GLUCOSE: 328 MG/DL (ref 70–110)
POCT GLUCOSE: 336 MG/DL (ref 70–110)
POCT GLUCOSE: 348 MG/DL (ref 70–110)

## 2022-03-12 PROCEDURE — 97530 THERAPEUTIC ACTIVITIES: CPT | Mod: CQ

## 2022-03-12 PROCEDURE — 25000003 PHARM REV CODE 250: Performed by: NURSE PRACTITIONER

## 2022-03-12 PROCEDURE — 97535 SELF CARE MNGMENT TRAINING: CPT | Mod: CO

## 2022-03-12 PROCEDURE — 11000004 HC SNF PRIVATE

## 2022-03-12 PROCEDURE — 97110 THERAPEUTIC EXERCISES: CPT | Mod: CQ

## 2022-03-12 PROCEDURE — C9399 UNCLASSIFIED DRUGS OR BIOLOG: HCPCS | Performed by: NURSE PRACTITIONER

## 2022-03-12 PROCEDURE — 97116 GAIT TRAINING THERAPY: CPT | Mod: CQ

## 2022-03-12 PROCEDURE — 25000003 PHARM REV CODE 250: Performed by: HOSPITALIST

## 2022-03-12 RX ADMIN — INSULIN DETEMIR 4 UNITS: 100 INJECTION, SOLUTION SUBCUTANEOUS at 08:03

## 2022-03-12 RX ADMIN — SUCRALFATE 1 G: 1 TABLET ORAL at 12:03

## 2022-03-12 RX ADMIN — LACOSAMIDE 100 MG: 50 TABLET, FILM COATED ORAL at 08:03

## 2022-03-12 RX ADMIN — INSULIN ASPART 6 UNITS: 100 INJECTION, SOLUTION INTRAVENOUS; SUBCUTANEOUS at 04:03

## 2022-03-12 RX ADMIN — Medication 500 MG: at 08:03

## 2022-03-12 RX ADMIN — LOSARTAN POTASSIUM 25 MG: 25 TABLET, FILM COATED ORAL at 08:03

## 2022-03-12 RX ADMIN — GABAPENTIN 200 MG: 100 CAPSULE ORAL at 08:03

## 2022-03-12 RX ADMIN — DICLOFENAC SODIUM 4 G: 10 GEL TOPICAL at 08:03

## 2022-03-12 RX ADMIN — APIXABAN 5 MG: 2.5 TABLET, FILM COATED ORAL at 08:03

## 2022-03-12 RX ADMIN — INSULIN ASPART 2 UNITS: 100 INJECTION, SOLUTION INTRAVENOUS; SUBCUTANEOUS at 08:03

## 2022-03-12 RX ADMIN — AMIODARONE HYDROCHLORIDE 200 MG: 200 TABLET ORAL at 08:03

## 2022-03-12 RX ADMIN — INSULIN ASPART 4 UNITS: 100 INJECTION, SOLUTION INTRAVENOUS; SUBCUTANEOUS at 04:03

## 2022-03-12 RX ADMIN — SUCRALFATE 1 G: 1 TABLET ORAL at 08:03

## 2022-03-12 RX ADMIN — CLOPIDOGREL 75 MG: 75 TABLET, FILM COATED ORAL at 08:03

## 2022-03-12 RX ADMIN — DICLOFENAC SODIUM 4 G: 10 GEL TOPICAL at 02:03

## 2022-03-12 RX ADMIN — PANTOPRAZOLE SODIUM 40 MG: 40 TABLET, DELAYED RELEASE ORAL at 08:03

## 2022-03-12 RX ADMIN — Medication 400 MG: at 08:03

## 2022-03-12 RX ADMIN — GABAPENTIN 200 MG: 100 CAPSULE ORAL at 02:03

## 2022-03-12 RX ADMIN — THERA TABS 1 TABLET: TAB at 08:03

## 2022-03-12 RX ADMIN — OXYCODONE 5 MG: 5 TABLET ORAL at 08:03

## 2022-03-12 RX ADMIN — INSULIN ASPART 6 UNITS: 100 INJECTION, SOLUTION INTRAVENOUS; SUBCUTANEOUS at 12:03

## 2022-03-12 RX ADMIN — INSULIN ASPART 4 UNITS: 100 INJECTION, SOLUTION INTRAVENOUS; SUBCUTANEOUS at 12:03

## 2022-03-12 RX ADMIN — ATORVASTATIN CALCIUM 40 MG: 20 TABLET, FILM COATED ORAL at 08:03

## 2022-03-12 RX ADMIN — ASPIRIN 81 MG: 81 TABLET, COATED ORAL at 08:03

## 2022-03-12 RX ADMIN — INSULIN ASPART 3 UNITS: 100 INJECTION, SOLUTION INTRAVENOUS; SUBCUTANEOUS at 08:03

## 2022-03-12 RX ADMIN — SUCRALFATE 1 G: 1 TABLET ORAL at 04:03

## 2022-03-12 RX ADMIN — INSULIN ASPART 6 UNITS: 100 INJECTION, SOLUTION INTRAVENOUS; SUBCUTANEOUS at 08:03

## 2022-03-12 NOTE — PT/OT/SLP PROGRESS
"Physical Therapy Treatment    Patient Name:  Kamar Muñoz   MRN:  [id number]  Admit Date: 3/10/2022  Admitting Diagnosis: Hypotension  Recent Surgeries:     General Precautions: Standard, fall (previous COVID pt.)   Orthopedic Precautions:N/A   Braces:       Recommendations:     Discharge Recommendations:  home health PT   Level of Assistance Recommended at Discharge: Intermittent assistance   Discharge Equipment Recommendations: wheelchair, walker, rolling, bedside commode   Barriers to discharge: Decreased caregiver support (pt's kids work during the day)    Assessment:     Kamar Muñoz is a 76 y.o. male admitted with a medical diagnosis of .    Hypotension Pt tolerated well, pt is very pleasant, demo good effort, ed on the importance of inc OOB tolerance and pressure relief, pt verbalized understanding, pt would continue to benefit from skilled PT services to improve overall functional mobility, strength and endurance.    Performance deficits affecting function:  weakness, impaired endurance, impaired self care skills, impaired functional mobilty, gait instability, impaired balance, decreased lower extremity function, impaired cardiopulmonary response to activity .    Rehab Potential is good    Activity Tolerance: Fair    Plan:     Patient to be seen 6 x/week to address the above listed problems via gait training, therapeutic exercises, neuromuscular re-education, wheelchair management/training    · Plan of Care Expires: 04/10/22  · Plan of Care Reviewed with: patient    Subjective     "OK".     Pain/Comfort:  · Pain Rating 1: 7/10  · Location - Orientation 1: posterior  · Location 1:  (buttock, ed on pressure relief in WC and bed)  · Pain Addressed 1: Reposition, Distraction, Cessation of Activity, Nurse notified (post session pt requested nsg put cream/meds on, nsg notified)  · Pain Rating Post-Intervention 1:  (gone up a some)    Patient's cultural, spiritual, Pentecostal conflicts given the current " situation:  · no    Objective:     Communicated with nsg post session re pts c/o pain requesting meds for buttocks .  Patient found  with   upon PT entry to room.     Therapeutic Activities and Exercises: mini elliptical x 10 min light resistance    Functional Mobility:  · Bed Mobility:     · Sit to Supine: supervision  · Transfers:     · Sit to Stand:  contact guard assistance with rolling walker vcs for tech  · Bed to Chair: contact guard assistance with  rolling walker  using  Stand Pivot  · Gait: amb with RW CGA ~ 114 ft and 64 ft seated rest break  · Wheelchair Propulsion: ~60 ft with BUE vc/CGA for steering veers to left    AM-PAC 6 CLICK MOBILITY  17    Patient left supine with call button in reach and belonging sin reach.    GOALS:   Multidisciplinary Problems     Physical Therapy Goals        Problem: Physical Therapy Goal    Goal Priority Disciplines Outcome Goal Variances Interventions   Physical Therapy Goal     PT, PT/OT Ongoing, Progressing     Description: Goals to be met by: 3/25/22    Patient will increase functional independence with mobility by performing:    . Supine to sit with [place]  . Sit to supine with [place]  . Rolling to Left and Right with [place].  . Sit to stand transfer with Supervision  . Bed to chair transfer with Supervision using No Assistive Device  . Gait  x 150 feet with Supervision using Rolling Walker.   . Wheelchair propulsion x150 feet with Supervision using bilateral uppper extremities  . Ascend/descend 4 stair with bilateral Handrails Contact Guard Assistance using No Assistive Device.   . Ascend/Descend 4 inch curb step with Contact Guard Assistance using Rolling Walker.  . Retrieve object off floor in standing with RW and reacher and SBA.                     Time Tracking:     PT Received On: 03/12/22  PT Total Time (min):       Billable Minutes: Gait Training 14, Therapeutic Activity 20 and Therapeutic Exercise 10    Treatment Type: Treatment  PT/PTA:  PTA     PTA Visit Number: 1 03/12/2022

## 2022-03-12 NOTE — PLAN OF CARE
Problem: Adult Inpatient Plan of Care  Goal: Plan of Care Review  Outcome: Ongoing, Progressing  Goal: Patient-Specific Goal (Individualized)  Outcome: Ongoing, Progressing  Goal: Absence of Hospital-Acquired Illness or Injury  Outcome: Ongoing, Progressing  Goal: Optimal Comfort and Wellbeing  Outcome: Ongoing, Progressing     Problem: Diabetes Comorbidity  Goal: Blood Glucose Level Within Targeted Range  Outcome: Ongoing, Progressing     Problem: Adjustment to Illness (Sepsis/Septic Shock)  Goal: Optimal Coping  Outcome: Ongoing, Progressing     Problem: Fluid and Electrolyte Imbalance (Acute Kidney Injury/Impairment)  Goal: Fluid and Electrolyte Balance  Outcome: Ongoing, Progressing     Problem: Oral Intake Inadequate (Acute Kidney Injury/Impairment)  Goal: Optimal Nutrition Intake  Outcome: Ongoing, Progressing

## 2022-03-12 NOTE — CONSULTS
Nutrition-Related Diabetes Education      Time Spent:    Learners:Patient  Current HbA1c:11.5  Is patient aware of their A1c and their goal A1c?       _____X__ yes Always elevated, patient is not following diabetic diet at home. Lives with spouse , both of them shop and cook. He is buying all no added sugar products such as cookies, but they do have carbohydrate content. There is no regard for portion size. He reports that he could purchase a 16 inch long po boy sandwich himself to eat.     Home diabetes medication(s): metformin, novolog w meals, levimer at     Nutrition Education with handouts: Carbohydrate counting, portion sizes of CHO containing foods    Comments:Patient does not know how they will shop once they both return home from hospitalizations. Neither of them may be strong enough to lift the scooter in and out of the car.  Discussed on-line shopping with store pickup          Barriers to Learning: not ready for diet lifestyle change    Follow up: weekly

## 2022-03-12 NOTE — PLAN OF CARE
Problem: Occupational Therapy Goal  Goal: Occupational Therapy Goal  Description: Goals to be met by: 3/31/22     Patient will increase functional independence with ADLs by performing:    UE Dressing with Modified [place].  LE Dressing with Set-up Assistance.  Grooming while seated at sink with Modified [place].  Toileting from toilet or BSC with Supervision for hygiene and clothing management.   Bathing from sitting on shower seat with Supervision.  Supine to sit with Modified [place].  Stand pivot transfers with Modified [place] using A/D as needed..  Toilet transfer to toilet or BSC with Modified [place] using A/D as needed  Upper extremity exercise using UBE with mod resistance for 10 minutes to increased functional   endurance to perform functional tasks and mobility.       Caregiver will be educated on level of assist required to safely perform self care tasks and functional transfers..    Outcome: Ongoing, Progressing

## 2022-03-12 NOTE — PT/OT/SLP PROGRESS
Occupational Therapy   Treatment    Name: Kamar Muñoz  MRN: [id number]  Admit Date: 3/10/2022  Admitting Diagnosis:  <principal problem not specified>    General Precautions: Standard, fall   Orthopedic Precautions:N/A   Braces:       Recommendations:     Discharge Recommendations: home health OT  Level of Assistance Recommended at Discharge: Intermittent assistance for ADL's and homemaking tasks  Discharge Equipment Recommendations:  bedside commode, walker, rolling, wheelchair (W/C with 18x16 seat, swing away desk length arm rests and swing away fot rests with heel loops.)  Barriers to discharge:  Decreased caregiver support    Assessment:     Kamar Muñoz is a 76 y.o. male with a medical diagnosis of <principal problem not specified>   He presents with  Performance deficits affecting function are weakness, impaired endurance, impaired self care skills, impaired functional mobilty, gait instability, impaired balance, decreased coordination, decreased upper extremity function, pain, decreased lower extremity function, impaired cardiopulmonary response to activity.     Pt. Was cooperative and participated well with session on this day. Pt  continues to demonstrate levels of physical deficits with  functional indep with daily management activities tasks, selfcare skills with balance,  functional mobility, UB strength and endurance. Pt. Will continue to benefit from continued OT to progress towards goals     Rehab Potential is fair    Activity tolerance:  Fair    Plan:     Patient to be seen 6 x/week to address the above listed problems via self-care/home management, therapeutic activities, therapeutic exercises    · Plan of Care Expires: 04/11/22  · Plan of Care Reviewed with: patient    Subjective     Communicated with: nsg and Pt. prior to session.I am doing well today    Pain/Comfort:  · Pain Rating 1: 6/10  · Location - Side 1: Bilateral  · Location - Orientation 1: lower  · Location 1: back  · Pain  Addressed 1: Pre-medicate for activity, Reposition, Distraction    Patient's cultural, spiritual, Nondenominational conflicts given the current situation:  · no    Objective:     Patient found supine    upon OT entry to room.    Bed Mobility:    · Patient completed Scooting/Bridging with minimum assistance  · Patient completed Supine to Sit with minimum assistance     Functional Mobility/Transfers:  · Patient completed Sit <> Stand Transfer with contact guard assistance and minimum assistance  with  rolling walker   · Patient completed Bed <> Chair Transfer using Stand Pivot technique with contact guard assistance and minimum assistance with rolling walker  · Patient completed Toilet Transfer Stand Pivot technique with contact guard assistance and minimum assistance with  rolling walker  Activities of Daily Living:  · Grooming: stand by assistance with grooming needs seated at sink level  · Upper Body Dressing: minimum assistance to pema pull over shirt\  · Lower Body Dressing: moderate assistance to pema pants seated and manage over hips instance with RW for bal and (A) for BLE socks  · Toileting: minimum assistance from 3n1 over toilet and clothing management Pt. able to (A) with cleaning of post serina area    AMPAC 6 Click ADL: 16    Treatment & Education:  Pt edu on role of OT, POC, safety when performing self care tasks , benefit of performing OOB activity, and safety when performing functional transfers and mobility management for preparation with goals to progress towards next level of care    Patient left up in chair with all lines intact and call button in reachEducation:      GOALS:   Multidisciplinary Problems     Occupational Therapy Goals        Problem: Occupational Therapy Goal    Goal Priority Disciplines Outcome Interventions   Occupational Therapy Goal     OT, PT/OT Ongoing, Progressing    Description: Goals to be met by: 3/31/22     Patient will increase functional independence with ADLs by  performing:    UE Dressing with Modified [place].  LE Dressing with Set-up Assistance.  Grooming while seated at sink with Modified [place].  Toileting from toilet or BSC with Supervision for hygiene and clothing management.   Bathing from sitting on shower seat with Supervision.  Supine to sit with Modified [place].  Stand pivot transfers with Modified [place] using A/D as needed..  Toilet transfer to toilet or BSC with Modified [place] using A/D as needed  Upper extremity exercise using UBE with mod resistance for 10 minutes to increased functional   endurance to perform functional tasks and mobility.       Caregiver will be educated on level of assist required to safely perform self care tasks and functional transfers..                     Time Tracking:     OT Date of Treatment: OT Date of Treatment: 03/12/22  OT Total Time (min):      Billable Minutes:Self Care/Home Management 44    3/12/2022  A client care conference was performed between the HEIDY and GAURAV, prior to treatment to discuss the patient's status, treatment plan and established goals.

## 2022-03-13 LAB
POCT GLUCOSE: 208 MG/DL (ref 70–110)
POCT GLUCOSE: 287 MG/DL (ref 70–110)
POCT GLUCOSE: 305 MG/DL (ref 70–110)
POCT GLUCOSE: 338 MG/DL (ref 70–110)

## 2022-03-13 PROCEDURE — 11000004 HC SNF PRIVATE

## 2022-03-13 PROCEDURE — 25000003 PHARM REV CODE 250: Performed by: NURSE PRACTITIONER

## 2022-03-13 PROCEDURE — 25000003 PHARM REV CODE 250: Performed by: HOSPITALIST

## 2022-03-13 RX ADMIN — Medication 500 MG: at 08:03

## 2022-03-13 RX ADMIN — INSULIN DETEMIR 4 UNITS: 100 INJECTION, SOLUTION SUBCUTANEOUS at 09:03

## 2022-03-13 RX ADMIN — AMIODARONE HYDROCHLORIDE 200 MG: 200 TABLET ORAL at 08:03

## 2022-03-13 RX ADMIN — SENNOSIDES AND DOCUSATE SODIUM 1 TABLET: 50; 8.6 TABLET ORAL at 08:03

## 2022-03-13 RX ADMIN — INSULIN ASPART 4 UNITS: 100 INJECTION, SOLUTION INTRAVENOUS; SUBCUTANEOUS at 05:03

## 2022-03-13 RX ADMIN — Medication 400 MG: at 08:03

## 2022-03-13 RX ADMIN — PANTOPRAZOLE SODIUM 40 MG: 40 TABLET, DELAYED RELEASE ORAL at 08:03

## 2022-03-13 RX ADMIN — SUCRALFATE 1 G: 1 TABLET ORAL at 05:03

## 2022-03-13 RX ADMIN — APIXABAN 5 MG: 2.5 TABLET, FILM COATED ORAL at 08:03

## 2022-03-13 RX ADMIN — THERA TABS 1 TABLET: TAB at 08:03

## 2022-03-13 RX ADMIN — INSULIN ASPART 6 UNITS: 100 INJECTION, SOLUTION INTRAVENOUS; SUBCUTANEOUS at 07:03

## 2022-03-13 RX ADMIN — INSULIN ASPART 2 UNITS: 100 INJECTION, SOLUTION INTRAVENOUS; SUBCUTANEOUS at 09:03

## 2022-03-13 RX ADMIN — DICLOFENAC SODIUM 4 G: 10 GEL TOPICAL at 09:03

## 2022-03-13 RX ADMIN — GABAPENTIN 200 MG: 100 CAPSULE ORAL at 08:03

## 2022-03-13 RX ADMIN — INSULIN ASPART 6 UNITS: 100 INJECTION, SOLUTION INTRAVENOUS; SUBCUTANEOUS at 12:03

## 2022-03-13 RX ADMIN — INSULIN ASPART 2 UNITS: 100 INJECTION, SOLUTION INTRAVENOUS; SUBCUTANEOUS at 08:03

## 2022-03-13 RX ADMIN — Medication 6 MG: at 08:03

## 2022-03-13 RX ADMIN — LOSARTAN POTASSIUM 25 MG: 25 TABLET, FILM COATED ORAL at 08:03

## 2022-03-13 RX ADMIN — INSULIN DETEMIR 4 UNITS: 100 INJECTION, SOLUTION SUBCUTANEOUS at 08:03

## 2022-03-13 RX ADMIN — ATORVASTATIN CALCIUM 40 MG: 20 TABLET, FILM COATED ORAL at 08:03

## 2022-03-13 RX ADMIN — SUCRALFATE 1 G: 1 TABLET ORAL at 12:03

## 2022-03-13 RX ADMIN — LACOSAMIDE 100 MG: 50 TABLET, FILM COATED ORAL at 08:03

## 2022-03-13 RX ADMIN — CLOPIDOGREL 75 MG: 75 TABLET, FILM COATED ORAL at 08:03

## 2022-03-13 RX ADMIN — OXYCODONE 5 MG: 5 TABLET ORAL at 08:03

## 2022-03-13 RX ADMIN — DICLOFENAC SODIUM 4 G: 10 GEL TOPICAL at 08:03

## 2022-03-13 RX ADMIN — SUCRALFATE 1 G: 1 TABLET ORAL at 08:03

## 2022-03-13 RX ADMIN — GABAPENTIN 200 MG: 100 CAPSULE ORAL at 02:03

## 2022-03-13 RX ADMIN — INSULIN ASPART 6 UNITS: 100 INJECTION, SOLUTION INTRAVENOUS; SUBCUTANEOUS at 05:03

## 2022-03-13 RX ADMIN — ASPIRIN 81 MG: 81 TABLET, COATED ORAL at 08:03

## 2022-03-13 RX ADMIN — INSULIN ASPART 3 UNITS: 100 INJECTION, SOLUTION INTRAVENOUS; SUBCUTANEOUS at 12:03

## 2022-03-13 NOTE — PLAN OF CARE
Problem: Adult Inpatient Plan of Care  Goal: Plan of Care Review  Outcome: Ongoing, Progressing  Flowsheets (Taken 3/12/2022 2134)  Plan of Care Reviewed With: patient  Goal: Absence of Hospital-Acquired Illness or Injury  Outcome: Ongoing, Progressing  Goal: Optimal Comfort and Wellbeing  Outcome: Ongoing, Progressing     Problem: Diabetes Comorbidity  Goal: Blood Glucose Level Within Targeted Range  Outcome: Ongoing, Not Progressing     Problem: Oral Intake Inadequate (Acute Kidney Injury/Impairment)  Goal: Optimal Nutrition Intake  Outcome: Ongoing, Progressing     Problem: Impaired Wound Healing  Goal: Optimal Wound Healing  Outcome: Ongoing, Progressing     Problem: Fall Injury Risk  Goal: Absence of Fall and Fall-Related Injury  Outcome: Ongoing, Progressing     Problem: Skin Injury Risk Increased  Goal: Skin Health and Integrity  Outcome: Ongoing, Progressing     Problem: Malnutrition  Goal: Improved Nutritional Intake  Outcome: Ongoing, Progressing  Plan of care reviewed with pt. Pt voiced understanding. Pt AAO X 5. Pt c/o lower back pain but refusing diclofenac cream and lidocaine patch due to them being too cold when placed. Pt took oxycodone at pm meds. No apparent distress noted. Fall precautions maintained. Pt remains free of fall or injury. Bed in lowest position, locked, and call light within reach. Side rails up x's 2 with slip resistant socks on. Pt able to make frequent weight shifts during the shift.  Pt's blood sugar tonight was 336. Pt states he hasn't been eating much, mostly just drinking the boost so unsure why his blood sugar is so high tonight.

## 2022-03-14 ENCOUNTER — LAB VISIT (OUTPATIENT)
Dept: LAB | Facility: OTHER | Age: 77
End: 2022-03-14
Payer: MEDICARE

## 2022-03-14 DIAGNOSIS — Z20.822 ENCOUNTER FOR LABORATORY TESTING FOR COVID-19 VIRUS: ICD-10-CM

## 2022-03-14 LAB
ANION GAP SERPL CALC-SCNC: 8 MMOL/L (ref 8–16)
BASOPHILS # BLD AUTO: 0.06 K/UL (ref 0–0.2)
BASOPHILS NFR BLD: 1 % (ref 0–1.9)
BUN SERPL-MCNC: 13 MG/DL (ref 8–23)
CALCIUM SERPL-MCNC: 8.5 MG/DL (ref 8.7–10.5)
CHLORIDE SERPL-SCNC: 99 MMOL/L (ref 95–110)
CO2 SERPL-SCNC: 27 MMOL/L (ref 23–29)
CREAT SERPL-MCNC: 0.9 MG/DL (ref 0.5–1.4)
DIFFERENTIAL METHOD: ABNORMAL
EOSINOPHIL # BLD AUTO: 0.3 K/UL (ref 0–0.5)
EOSINOPHIL NFR BLD: 5.5 % (ref 0–8)
ERYTHROCYTE [DISTWIDTH] IN BLOOD BY AUTOMATED COUNT: 17.4 % (ref 11.5–14.5)
EST. GFR  (AFRICAN AMERICAN): >60 ML/MIN/1.73 M^2
EST. GFR  (NON AFRICAN AMERICAN): >60 ML/MIN/1.73 M^2
GLUCOSE SERPL-MCNC: 333 MG/DL (ref 70–110)
HCT VFR BLD AUTO: 35.4 % (ref 40–54)
HGB BLD-MCNC: 11.1 G/DL (ref 14–18)
IMM GRANULOCYTES # BLD AUTO: 0.02 K/UL (ref 0–0.04)
IMM GRANULOCYTES NFR BLD AUTO: 0.3 % (ref 0–0.5)
LYMPHOCYTES # BLD AUTO: 1.8 K/UL (ref 1–4.8)
LYMPHOCYTES NFR BLD: 28.1 % (ref 18–48)
MAGNESIUM SERPL-MCNC: 1.6 MG/DL (ref 1.6–2.6)
MCH RBC QN AUTO: 28.2 PG (ref 27–31)
MCHC RBC AUTO-ENTMCNC: 31.4 G/DL (ref 32–36)
MCV RBC AUTO: 90 FL (ref 82–98)
MONOCYTES # BLD AUTO: 0.6 K/UL (ref 0.3–1)
MONOCYTES NFR BLD: 10.1 % (ref 4–15)
NEUTROPHILS # BLD AUTO: 3.4 K/UL (ref 1.8–7.7)
NEUTROPHILS NFR BLD: 55 % (ref 38–73)
NRBC BLD-RTO: 0 /100 WBC
PHOSPHATE SERPL-MCNC: 2.8 MG/DL (ref 2.7–4.5)
PLATELET # BLD AUTO: 251 K/UL (ref 150–450)
PMV BLD AUTO: 10 FL (ref 9.2–12.9)
POCT GLUCOSE: 318 MG/DL (ref 70–110)
POCT GLUCOSE: 321 MG/DL (ref 70–110)
POCT GLUCOSE: 327 MG/DL (ref 70–110)
POCT GLUCOSE: 360 MG/DL (ref 70–110)
POTASSIUM SERPL-SCNC: 4.4 MMOL/L (ref 3.5–5.1)
RBC # BLD AUTO: 3.93 M/UL (ref 4.6–6.2)
SODIUM SERPL-SCNC: 134 MMOL/L (ref 136–145)
WBC # BLD AUTO: 6.23 K/UL (ref 3.9–12.7)

## 2022-03-14 PROCEDURE — 97110 THERAPEUTIC EXERCISES: CPT | Mod: CQ

## 2022-03-14 PROCEDURE — 80048 BASIC METABOLIC PNL TOTAL CA: CPT | Performed by: HOSPITALIST

## 2022-03-14 PROCEDURE — 25000003 PHARM REV CODE 250: Performed by: HOSPITALIST

## 2022-03-14 PROCEDURE — 97530 THERAPEUTIC ACTIVITIES: CPT | Mod: CQ

## 2022-03-14 PROCEDURE — U0003 INFECTIOUS AGENT DETECTION BY NUCLEIC ACID (DNA OR RNA); SEVERE ACUTE RESPIRATORY SYNDROME CORONAVIRUS 2 (SARS-COV-2) (CORONAVIRUS DISEASE [COVID-19]), AMPLIFIED PROBE TECHNIQUE, MAKING USE OF HIGH THROUGHPUT TECHNOLOGIES AS DESCRIBED BY CMS-2020-01-R: HCPCS | Performed by: EMERGENCY MEDICINE

## 2022-03-14 PROCEDURE — 85025 COMPLETE CBC W/AUTO DIFF WBC: CPT | Performed by: HOSPITALIST

## 2022-03-14 PROCEDURE — 97116 GAIT TRAINING THERAPY: CPT | Mod: CQ

## 2022-03-14 PROCEDURE — 11000004 HC SNF PRIVATE

## 2022-03-14 PROCEDURE — 84100 ASSAY OF PHOSPHORUS: CPT | Performed by: HOSPITALIST

## 2022-03-14 PROCEDURE — 36415 COLL VENOUS BLD VENIPUNCTURE: CPT | Performed by: HOSPITALIST

## 2022-03-14 PROCEDURE — 97535 SELF CARE MNGMENT TRAINING: CPT

## 2022-03-14 PROCEDURE — 83735 ASSAY OF MAGNESIUM: CPT | Performed by: HOSPITALIST

## 2022-03-14 PROCEDURE — 97110 THERAPEUTIC EXERCISES: CPT

## 2022-03-14 PROCEDURE — 25000003 PHARM REV CODE 250: Performed by: NURSE PRACTITIONER

## 2022-03-14 RX ORDER — IPRATROPIUM BROMIDE AND ALBUTEROL SULFATE 2.5; .5 MG/3ML; MG/3ML
3 SOLUTION RESPIRATORY (INHALATION) EVERY 6 HOURS PRN
Status: DISCONTINUED | OUTPATIENT
Start: 2022-03-14 | End: 2022-04-01 | Stop reason: HOSPADM

## 2022-03-14 RX ORDER — CETIRIZINE HYDROCHLORIDE 5 MG/1
10 TABLET ORAL NIGHTLY
Status: DISCONTINUED | OUTPATIENT
Start: 2022-03-14 | End: 2022-04-01 | Stop reason: HOSPADM

## 2022-03-14 RX ADMIN — ASPIRIN 81 MG: 81 TABLET, COATED ORAL at 08:03

## 2022-03-14 RX ADMIN — LACOSAMIDE 100 MG: 50 TABLET, FILM COATED ORAL at 09:03

## 2022-03-14 RX ADMIN — SUCRALFATE 1 G: 1 TABLET ORAL at 08:03

## 2022-03-14 RX ADMIN — APIXABAN 5 MG: 2.5 TABLET, FILM COATED ORAL at 09:03

## 2022-03-14 RX ADMIN — DICLOFENAC SODIUM 4 G: 10 GEL TOPICAL at 04:03

## 2022-03-14 RX ADMIN — INSULIN ASPART 6 UNITS: 100 INJECTION, SOLUTION INTRAVENOUS; SUBCUTANEOUS at 12:03

## 2022-03-14 RX ADMIN — APIXABAN 5 MG: 2.5 TABLET, FILM COATED ORAL at 10:03

## 2022-03-14 RX ADMIN — LOSARTAN POTASSIUM 25 MG: 25 TABLET, FILM COATED ORAL at 08:03

## 2022-03-14 RX ADMIN — SUCRALFATE 1 G: 1 TABLET ORAL at 01:03

## 2022-03-14 RX ADMIN — Medication 500 MG: at 09:03

## 2022-03-14 RX ADMIN — Medication 400 MG: at 08:03

## 2022-03-14 RX ADMIN — GABAPENTIN 200 MG: 100 CAPSULE ORAL at 03:03

## 2022-03-14 RX ADMIN — ATORVASTATIN CALCIUM 40 MG: 20 TABLET, FILM COATED ORAL at 08:03

## 2022-03-14 RX ADMIN — Medication 400 MG: at 09:03

## 2022-03-14 RX ADMIN — DICLOFENAC SODIUM 4 G: 10 GEL TOPICAL at 08:03

## 2022-03-14 RX ADMIN — CETIRIZINE HYDROCHLORIDE 10 MG: 5 TABLET ORAL at 09:03

## 2022-03-14 RX ADMIN — GABAPENTIN 200 MG: 100 CAPSULE ORAL at 08:03

## 2022-03-14 RX ADMIN — DICLOFENAC SODIUM 4 G: 10 GEL TOPICAL at 09:03

## 2022-03-14 RX ADMIN — Medication 6 MG: at 09:03

## 2022-03-14 RX ADMIN — Medication 500 MG: at 08:03

## 2022-03-14 RX ADMIN — INSULIN ASPART 5 UNITS: 100 INJECTION, SOLUTION INTRAVENOUS; SUBCUTANEOUS at 08:03

## 2022-03-14 RX ADMIN — CLOPIDOGREL 75 MG: 75 TABLET, FILM COATED ORAL at 08:03

## 2022-03-14 RX ADMIN — INSULIN DETEMIR 4 UNITS: 100 INJECTION, SOLUTION SUBCUTANEOUS at 08:03

## 2022-03-14 RX ADMIN — THERA TABS 1 TABLET: TAB at 08:03

## 2022-03-14 RX ADMIN — SUCRALFATE 1 G: 1 TABLET ORAL at 05:03

## 2022-03-14 RX ADMIN — PANTOPRAZOLE SODIUM 40 MG: 40 TABLET, DELAYED RELEASE ORAL at 08:03

## 2022-03-14 RX ADMIN — INSULIN ASPART 4 UNITS: 100 INJECTION, SOLUTION INTRAVENOUS; SUBCUTANEOUS at 12:03

## 2022-03-14 RX ADMIN — SENNOSIDES AND DOCUSATE SODIUM 1 TABLET: 50; 8.6 TABLET ORAL at 09:03

## 2022-03-14 RX ADMIN — INSULIN ASPART 6 UNITS: 100 INJECTION, SOLUTION INTRAVENOUS; SUBCUTANEOUS at 08:03

## 2022-03-14 RX ADMIN — LACOSAMIDE 100 MG: 50 TABLET, FILM COATED ORAL at 08:03

## 2022-03-14 RX ADMIN — AMIODARONE HYDROCHLORIDE 200 MG: 200 TABLET ORAL at 08:03

## 2022-03-14 RX ADMIN — INSULIN ASPART 4 UNITS: 100 INJECTION, SOLUTION INTRAVENOUS; SUBCUTANEOUS at 05:03

## 2022-03-14 RX ADMIN — OXYCODONE 5 MG: 5 TABLET ORAL at 08:03

## 2022-03-14 RX ADMIN — INSULIN ASPART 6 UNITS: 100 INJECTION, SOLUTION INTRAVENOUS; SUBCUTANEOUS at 05:03

## 2022-03-14 RX ADMIN — GABAPENTIN 200 MG: 100 CAPSULE ORAL at 09:03

## 2022-03-14 NOTE — PLAN OF CARE
Problem: Adult Inpatient Plan of Care  Goal: Plan of Care Review  Outcome: Ongoing, Progressing  Flowsheets (Taken 3/14/2022 0330)  Plan of Care Reviewed With: patient  Goal: Patient-Specific Goal (Individualized)  Outcome: Ongoing, Progressing  Flowsheets (Taken 3/14/2022 0330)  Anxieties, Fears or Concerns: worried about his wife getting out and being alone without him  Individualized Care Needs: monitor BGL and adjust insulin dose  Patient-Specific Goals (Include Timeframe): Patient will be free of falls during shift  Goal: Absence of Hospital-Acquired Illness or Injury  Outcome: Ongoing, Progressing  Intervention: Identify and Manage Fall Risk  Flowsheets (Taken 3/14/2022 0330)  Safety Promotion/Fall Prevention:   assistive device/personal item within reach   commode/urinal/bedpan at bedside   Fall Risk signage in place   lighting adjusted   nonskid shoes/socks when out of bed   side rails raised x 2   instructed to call staff for mobility  Goal: Optimal Comfort and Wellbeing  Outcome: Ongoing, Progressing  Intervention: Provide Person-Centered Care  Flowsheets (Taken 3/14/2022 0330)  Trust Relationship/Rapport:   care explained   questions encouraged   reassurance provided   choices provided   emotional support provided   thoughts/feelings acknowledged   empathic listening provided   questions answered     Problem: Diabetes Comorbidity  Goal: Blood Glucose Level Within Targeted Range  Outcome: Ongoing, Progressing  Intervention: Monitor and Manage Glycemia  Flowsheets (Taken 3/14/2022 0330)  Glycemic Management: blood glucose monitored     Problem: Adjustment to Illness (Sepsis/Septic Shock)  Goal: Optimal Coping  Outcome: Ongoing, Progressing  Intervention: Optimize Psychosocial Adjustment to Illness  Flowsheets (Taken 3/14/2022 0330)  Supportive Measures:   active listening utilized   verbalization of feelings encouraged  Family/Support System Care: support provided     Problem: Bleeding (Sepsis/Septic  Shock)  Goal: Absence of Bleeding  Outcome: Ongoing, Progressing  Intervention: Monitor and Manage Bleeding  Flowsheets (Taken 3/14/2022 0330)  Bleeding Precautions: blood pressure closely monitored     Problem: Glycemic Control Impaired (Sepsis/Septic Shock)  Goal: Blood Glucose Level Within Desired Range  Outcome: Ongoing, Progressing  Intervention: Optimize Glycemic Control  Flowsheets (Taken 3/14/2022 0330)  Glycemic Management: blood glucose monitored     Problem: Infection Progression (Sepsis/Septic Shock)  Goal: Absence of Infection Signs and Symptoms  Outcome: Ongoing, Progressing  Intervention: Promote Recovery  Flowsheets (Taken 3/14/2022 0330)  Sleep/Rest Enhancement:   awakenings minimized   consistent schedule promoted     Problem: Impaired Wound Healing  Goal: Optimal Wound Healing  Outcome: Ongoing, Progressing  Intervention: Promote Wound Healing  Flowsheets (Taken 3/14/2022 0330)  Oral Nutrition Promotion:   rest periods promoted   safe use of adaptive equipment encouraged  Sleep/Rest Enhancement:   awakenings minimized   consistent schedule promoted  Pain Management Interventions:   care clustered   quiet environment facilitated   relaxation techniques promoted

## 2022-03-14 NOTE — CLINICAL REVIEW
Clinical Pharmacy Chart Review Note      Admit Date: 3/10/2022   LOS: 4 days       Kamar Muñoz is a 76 y.o. male admitted to [facility] for PT/OT after hospitalization for malnutrition of moderate degree.    Active Hospital Problems    Diagnosis  POA    Malnutrition of moderate degree [E44.0]  Yes    Encephalopathy, metabolic [G93.41]  Yes    Coronary artery disease involving native heart without angina pectoris [I25.10]  Yes    Paroxysmal atrial fibrillation [I48.0]  Yes     Chronic    Embolic stroke involving right middle cerebral artery [I63.411]  Yes     Chronic    Type 2 diabetes mellitus with diabetic peripheral angiopathy without gangrene, with long-term current use of insulin [E11.51, Z79.4]  Not Applicable    Chronic pulmonary heart disease [I27.9]  Yes    Centrilobular emphysema [J43.2]  Yes     Hyperinflated cxr.  Emphysematous changes on lung cuts on ct abd.  Clinically asymptomatic aside from cough.  Baseline pft.  Prn albuterol.       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     Review of patient's allergies indicates:   Allergen Reactions    Iodine      Other reaction(s): swelling  Other reaction(s): Itching  Other reaction(s): Rash     Patient Active Problem List    Diagnosis Date Noted    Malnutrition of moderate degree 03/11/2022    Hypotension 03/05/2022    Discharge planning issues 03/02/2022    Pressure injury of buttock, unstageable 02/28/2022    Palliative care status 02/20/2022    Severe sepsis 02/20/2022    COVID-19 virus infection 02/18/2022    Alteration in skin integrity 02/01/2022    Ketosis-prone diabetes mellitus 01/30/2022    Diabetic ketoacidosis with coma associated with type 2  diabetes mellitus 01/29/2022    Hyperosmolar hyperglycemic state (HHS) 01/29/2022    Nausea & vomiting 01/29/2022    SIRS due to non-infectious process without acute organ dysfunction 01/29/2022    Focal seizures 01/26/2022    Facial droop 01/25/2022    Left arm weakness 01/25/2022    History of ischemic stroke in prior three months 01/25/2022    Altered mental status 01/25/2022    Hyperkalemia 01/25/2022    Encephalopathy, metabolic 01/25/2022    NSTEMI (non-ST elevated myocardial infarction) 01/25/2022    Stented coronary artery 01/19/2022    Coronary artery disease involving native heart without angina pectoris 01/19/2022    History of ST elevation myocardial infarction (STEMI) 01/19/2022    Embolic stroke involving left cerebellar artery 01/13/2022    Dysarthria and anarthria 01/13/2022    Embolic stroke involving right middle cerebral artery 01/12/2022    Paroxysmal atrial fibrillation 01/12/2022    BRENDAN (acute kidney injury) 01/12/2022    Coronary artery disease involving native coronary artery of native heart with unstable angina pectoris 01/09/2022    Type 2 diabetes mellitus with diabetic peripheral angiopathy without gangrene, with long-term current use of insulin 01/05/2022    Pulmonary nodules     PETTY (dyspnea on exertion) 02/04/2019    Chronic pulmonary heart disease     Personal history of colonic polyps 01/28/2019    Centrilobular emphysema 12/12/2018    Nasal congestion 12/12/2018    Cough     Cervical facet joint syndrome 04/02/2018    Cervical radiculopathy 02/26/2018    Osteoarthritis of cervical spine 02/26/2018    Arthropathy of cervical facet joint 02/26/2018    Renal stone 08/30/2016    Flank pain 08/30/2016    Personal history of unspecified digestive disease 10/03/2013    Type 2 diabetes mellitus with hyperglycemia, with long-term current use of insulin 03/08/2013    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 08/02/2012    Overweight (BMI 25.0-29.9) 07/20/2012     Hypertension associated with diabetes 07/20/2012    Proteinuria 07/20/2012       Scheduled Meds:    amiodarone  200 mg Oral Daily    apixaban  5 mg Oral BID    ascorbic acid (vitamin C)  500 mg Oral BID    aspirin  81 mg Oral Daily    atorvastatin  40 mg Oral Daily    clopidogreL  75 mg Oral Daily    diclofenac sodium  4 g Topical (Top) TID    ergocalciferol  50,000 Units Oral Q7 Days    gabapentin  200 mg Oral TID    insulin aspart U-100  6 Units Subcutaneous TIDWM    insulin detemir U-100  4 Units Subcutaneous BID    lacosamide  100 mg Oral Q12H    LIDOcaine  1 patch Transdermal Q24H    losartan  25 mg Oral Daily    magnesium oxide  400 mg Oral BID    multivitamin  1 tablet Oral Daily    pantoprazole  40 mg Oral Daily    senna-docusate 8.6-50 mg  1 tablet Oral BID    sucralfate  1 g Oral TID WM     Continuous Infusions:   PRN Meds: acetaminophen, albuterol, calcium carbonate, dextrose 10%, dextrose 10%, glucagon (human recombinant), glucose, glucose, insulin aspart U-100, loperamide, melatonin, oxyCODONE    OBJECTIVE:     Vital Signs (Last 24H)  Temp:  [97.9 °F (36.6 °C)]   Pulse:  [83]   Resp:  [14]   BP: (137)/(73)   SpO2:  [96 %]     Laboratory:  CBC:   Recent Labs   Lab 03/08/22  0556 03/09/22  0241 03/10/22  0241   WBC 7.34 5.74 5.46   RBC 4.19* 3.79* 3.94*   HGB 11.6* 10.8* 11.2*   HCT 36.2* 33.8* 34.3*    216 222   MCV 86 89 87   MCH 27.7 28.5 28.4   MCHC 32.0 32.0 32.7     BMP:   Recent Labs   Lab 03/08/22  0556 03/09/22  0241 03/10/22  0241   GLU 51* 234*  --     134*  --    K 3.6 4.7  --     99  --    CO2 29 24  --    BUN 11 15  --    CREATININE 0.7 1.0  --    CALCIUM 8.1* 7.8*  --    MG 1.7 1.7 1.5*     CMP:   Recent Labs   Lab 03/08/22  0556 03/09/22 0241   GLU 51* 234*   CALCIUM 8.1* 7.8*   ALBUMIN 2.0* 1.9*   PROT 6.2  --     134*   K 3.6 4.7   CO2 29 24    99   BUN 11 15   CREATININE 0.7 1.0   ALKPHOS 122  --    ALT 16  --    AST 27  --     BILITOT 0.5  --      LFTs:   Recent Labs   Lab 03/08/22  0556 03/09/22  0241   ALT 16  --    AST 27  --    ALKPHOS 122  --    BILITOT 0.5  --    PROT 6.2  --    ALBUMIN 2.0* 1.9*     Lab Results   Component Value Date    HGBA1C 11.5 (H) 01/26/2022     Lab Results   Component Value Date    TSH 0.600 01/25/2022         ASSESSMENT/PLAN:     I have reviewed the medications in compliance with CMS Regulation F756 of the [place]. Based on information gathered, the following items may need to be addressed:    **According to PMH and home medication list, patient takes the following medications at home. These medications are not currently ordered at [facility]:  · Diltiazem  mg daily  · Metformin 500 mg twice daily  · Metoprolol succinate 50 mg daily      Medications reviewed by PharmD, please re-consult if needed.      Naomi Delacruz, Pharm. D.  Clinical Pharmacist  [facility]

## 2022-03-14 NOTE — PT/OT/SLP PROGRESS
"Occupational Therapy   Treatment    Name: Kamar Muñoz  MRN: [id number]  Admit Date: 3/10/2022  Admitting Diagnosis:  Encephalopathy, Metabolic; Sepsis     General Precautions: Standard, fall   Orthopedic Precautions:N/A   Braces:       Recommendations:     Discharge Recommendations: home health OT  Level of Assistance Recommended at Discharge: Intermittent assistance for ADL's and homemaking tasks  Discharge Equipment Recommendations:  bedside commode, walker, rolling, wheelchair (W/C with 18x16 seat, swing away desk length arm rests and swing away fot rests with heel loops.)  Barriers to discharge:  Decreased caregiver support    Assessment:     Kamar Muñoz is a 76 y.o. male with a medical diagnosis of Encephalopathy, Metabolic; Sepsis.  He presents with performance deficits affecting function are weakness, impaired endurance, impaired self care skills, impaired functional mobilty, gait instability, impaired balance, decreased lower extremity function, decreased safety awareness, pain, impaired cardiopulmonary response to activity.   Pt tolerated session well with good participation throughout. Pt requires increased time/rest breaks to complete tasks due to impaired functional endurance affecting performance in functional activities. Pt would benefit from continued skilled OT services to address functional deficits for improved safety and independence in self care and other functional taks.     Rehab Potential is good    Activity tolerance:  Fair    Plan:     Patient to be seen 6 x/week to address the above listed problems via self-care/home management, therapeutic activities, therapeutic exercises    · Plan of Care Expires: 04/11/22  · Plan of Care Reviewed with: patient    Subjective   "I am going to try to do as much as I can for myself."   Communicated with: NAEEM prior to session.     Pain/Comfort:  Pain Rating 1: 7/10  Location - Side 1: Bilateral  Location - Orientation 1: posterior  Location 1:  " (sacral region)  Pain Addressed 1: Reposition, Distraction  Pain Rating Post-Intervention 1: 7/10    Patient's cultural, spiritual, Oriental orthodox conflicts given the current situation:  no    Objective:     Patient found right sidelying with  (no lines) upon OT entry to room.    Bed Mobility:    · Patient completed Scooting to EOB and Bridging to HOB with stand by assistance  · Patient completed Supine to Sit with stand by assistance     Functional Mobility/Transfers:  · Patient completed Sit <> Stand Transfer from EOB with stand by assistance  with  rolling walker   · Patient completed Bed > Chair Transfer using Stand Pivot technique with contact guard assistance with rolling walker  · Patient completed Toilet Transfer Stand Pivot technique with contact guard assistance with  rolling walker  · Functional Mobility: Pt completed functional mobility ~ 8 ft from in room bathroom using RW with CGA to steady.     Activities of Daily Living:  · Grooming: Pt required SBA to perform hand hygiene, wash face, and brush teeth seated in w/c at sink. Pt also performed shaving activity seated in w/c at sink with SBA.  · Toileting: moderate assistance to steady pt to clean front serina area, verbal cues for posture, and assist to manage clothing over hips in standing     [facility] 6 Click ADL: 17    OT Exercises: UE Ergometer 10 minutes min resistance with 2 rest breaks provided due to pt c/o fatigue, activity provided for improved endurance during self care and other functional tasks.     Treatment & Education:  Pt educated on:   - POC/OT role   - benefit of performing OOB activity   - safety when performing standing aspects of self care tasks, functional transfers  - proper body mechanics & posture in standing   Pt receptive to education providing verbal understanding on this day.     Patient left HOB elevated with call button in reach and NSG notifiedEducation:      GOALS:   Multidisciplinary Problems     Occupational Therapy Goals         Problem: Occupational Therapy Goal    Goal Priority Disciplines Outcome Interventions   Occupational Therapy Goal     OT, PT/OT Ongoing, Progressing    Description: Goals to be met by: 3/31/22     Patient will increase functional independence with ADLs by performing:    UE Dressing with Modified [place].  LE Dressing with Set-up Assistance.  Grooming while seated at sink with Modified [place].  Toileting from toilet or BSC with Supervision for hygiene and clothing management.   Bathing from sitting on shower seat with Supervision.  Supine to sit with Modified [place].  Stand pivot transfers with Modified [place] using A/D as needed..  Toilet transfer to toilet or BSC with Modified [place] using A/D as needed  Upper extremity exercise using UBE with mod resistance for 10 minutes to increased functional   endurance to perform functional tasks and mobility.       Caregiver will be educated on level of assist required to safely perform self care tasks and functional transfers..                     Time Tracking:     OT Date of Treatment: OT Date of Treatment: 03/14/22  OT Total Time (min): Total Time (min): 42 min    Billable Minutes:Self Care/Home Management 32  Therapeutic Exercise 10    3/14/2022

## 2022-03-14 NOTE — PLAN OF CARE
Problem: Occupational Therapy Goal  Goal: Occupational Therapy Goal  Description: Goals to be met by: 3/31/22     Patient will increase functional independence with ADLs by performing:    UE Dressing with Modified [place].  LE Dressing with Set-up Assistance.  Grooming while seated at sink with Modified [place].  Toileting from toilet or BSC with Supervision for hygiene and clothing management.   Bathing from sitting on shower seat with Supervision.  Supine to sit with Modified [place].  Stand pivot transfers with Modified [place] using A/D as needed..  Toilet transfer to toilet or BSC with Modified [place] using A/D as needed  Upper extremity exercise using UBE with mod resistance for 10 minutes to increased functional   endurance to perform functional tasks and mobility.       Caregiver will be educated on level of assist required to safely perform self care tasks and functional transfers..    Outcome: Ongoing, Progressing

## 2022-03-14 NOTE — PROGRESS NOTES
[facility]                                  Skilled Nursing Facility                   Progress Note     Admit Date: 3/10/2022  ALLIE 3/25/2022  Principal Problem:  Encephalopathy, metabolic   HPI obtained from patient interview and chart review     Chief Complaint: Re-evaluation of medical treatment and therapy status: Lab review, hyperglycemia, nasal congestion    HPI:   Kamar Muñoz is a 76 year old male with PMHx of CAD s/p 2 LAUREN in RCA (Jan 2022), HTN, HLD, p. A. 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  Admission to SNF for secondary weakness debility, continued insulin adjustments     Interval history: All of today's labs reviewed and are listed below.  Mag 1.6.  24 hr vital sign ranges listed below.  24 hour blood glucose range is 208-360.  Patient endorses nasal congestion.  Patient states he has been eating consistently with each meal.  Continues to endorse discomfort due to skin breakdown to buttocks.  Patient denies shortness of breath, abdominal discomfort, nausea, or vomiting.  Patient reports an adequate appetite.  Patient denies dysuria.  Patient reports having regular bowel movements.  Patient progessing with PT/OT. Continuing to follow and treat all acute and chronic conditions.      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    Past Surgical History: Patient has a past surgical history that includes Back  surgery; Eye surgery; Shoulder open rotator cuff repair; renal stones; Hernia repair; Colonoscopy (N/A, 1/28/2019); Cataract extraction w/  intraocular lens implant (Right); and Left heart catheterization (Left, 1/9/2022).    Social History: Patient reports that he quit smoking about 39 years ago. He has a 37.50 pack-year smoking history. He has never used smokeless tobacco. He reports that he does not drink alcohol and does not use drugs.    Family History:  No significant family history to report.    Allergies: Patient is allergic to iodine.    ROS  Constitutional: Negative for fever.  +appetite change   Eyes: Negative for blurred vision, double vision   Respiratory: Negative for shortness of breath.  +mild productive cough, nasal congestion   Cardiovascular: Negative for chest pain, palpitations, and leg swelling.   Gastrointestinal: Negative for abdominal pain, constipation, diarrhea, nausea, vomiting.   Genitourinary: Negative for dysuria, frequency   Musculoskeletal:  + generalized weakness. Negative for back pain and myalgias.   Skin: Negative for itching and rash.   Neurological: Negative for dizziness, headaches.   Psychiatric/Behavioral: Negative for depression. The patient is not nervous/anxious.      24 hour Vital Sign Range   Temp:  [97.2 °F (36.2 °C)-97.9 °F (36.6 °C)]   Pulse:  [78-83]   Resp:  [14-16]   BP: (129-137)/(73)   SpO2:  [93 %-96 %]     PEx  Constitutional: Patient appears debilitated.  No distress noted  HENT:   Head: Normocephalic and atraumatic.   Eyes: Pupils are equal, round  Neck: Normal range of motion. Neck supple.   Cardiovascular: Normal rate, regular rhythm and normal heart sounds.    Pulmonary/Chest: Effort normal and breath sounds are clear  Abdominal: Soft. Bowel sounds are normal.   Musculoskeletal: Normal range of motion.   Neurological: Alert and oriented to person, place, and time.   Psychiatric: Normal mood and affect. Behavior is normal.   Skin: Skin is warm and  dry.    Wound that you did not assess today:  Wound location(s)/type(s):  Buttocks  Not visualized today. No signs/symptoms of infection or wound-related changes reported.         Recent Labs   Lab 03/14/22  0523   *   K 4.4   CL 99   CO2 27   BUN 13   CREATININE 0.9   MG 1.6       Recent Labs   Lab 03/14/22  0523   WBC 6.23   RBC 3.93*   HGB 11.1*   HCT 35.4*      MCV 90   MCH 28.2   MCHC 31.4*         Assessment and Plan:       Type 2 diabetes mellitus with hyperglycemia, with long-term current use of insulin  - Per [facility], Patient with uncontrolled DM presenting with metabolic encephalopathy in the setting of DKA with coma. Suspect patient developed DKA in the setting of sepsis/ COVID-19- DKA protocol completed and DKA resolved in MICU and Pt stepped down on subq insulin.  Endocrine followed   - Diabetic diet, Accu-Cheks AC/HS  - home regimen with Levemir 20 units daily, NovoLog 9 units TID WM, metformin 1000 mg BID  - change detemir to 8 units BID, continue aspart 6 units TID WM    Nasal congestion  - initiated Zyrtec 10 mg qHS., Flonase daily    COVID-19 virus infection  Viral Pneumonia due to COVID-19  - COVID vaccinated with booster.   - Received 1 dose of sotrovimab infusion 02/18/2022  - Completed 5 days of remdesivir, had dexamethasone held initially due to DKA.  - Stable on room air  - End isolation on 3/9/2022  - continues to have mild productive cough   - initiated PRN DuoNebs     Hypertension associated with diabetes  - home regimen with losartan 25mg, Toprol xl 50mg, diltiazem 120mg at home  - continue losartan 25mg daily     Paroxysmal atrial fibrillation  - home Metoprolol XL 50 mg daily, diltiazem  mg daily and amiodarone 200 mg daily, apixaban 5mg BID  - continue amiodarone 20 mg daily, apixaban 5 mg BID. Holding metoprolol due to hypotension/bradycardia, holding diltiazem due to hypotension     Coronary artery disease involving native coronary artery of native heart with unstable  angina pectoris  HLD  - Hx of CAD s/p placement of 2 LAUREN in RCA in 01/09/2022.   - Continue home ASA, Plavix and statin  - Chest pain 3/4 after breakfast; EKG nonischemic. PPI, Tums, carafate added     Pulmonary nodules  - Has stable bilateral pulmonary nodules/masses with broad differential per outpatient pulmonology notes. No pathology report as none of the nodules appeared to be safe for biopsy given high risk of PTX is high with many adjacent bulla. Plan working on promoting functional independence for the time being with possible addressing of nodules in the future, which is highly dependent upon his overall functionality.     Chronic pulmonary heart disease  - Follows up with Pulmonary clinic in [place]. Last seen in December and was evaluated for pulmonary nodules. Deemed to have mild COPD per notes. Not on any maintenance therapy.  - Continue albuterol inhaler   - likely needs follow-up with Pulmonary after discharge from [facility]     Embolic stroke involving right middle cerebral artery  - Hx of CVA in Jan 2022.   - CT Head with evolving infarcts in right frontal and left cerebellar hemispheres.   - Has had issues with memory per family since CVA   - No concern for acute stroke this admit per discussion with Radiology.   - Continue home antiplatelets, statin and Eliquis     Focal seizures  - Continue home lancosamide      Pressure injury of buttock, unstageable  - Seen by wound care with Triad started  - Continues to cause patient discomfort  - initiate wound care consult at [facility]     Vitamin-D deficiency  - continue ergocalciferol 1000 units weekly     Hypomagnesemia  - magnesium oxide 400 mg BID      Peripheral neuropathy  - continue gabapentin 200 mg TID     Malnutrition of moderate degree  - RD following, appreciate recommendations, continue supplements as ordered     Debility   - Continue with PT/OT for gait training and strengthening and restoration of ADL's   - Encourage mobility, OOB in chair, and early  ambulation as appropriate  - Fall precautions   - Monitor for bowel and bladder dysfunction  - Monitor for and prevent skin breakdown and pressure ulcers  - Continue DVT prophylaxis with apixaban        Anticipate disposition:  Home with home health        Follow-up needed during SNF stay-     Appointment not to send patient to- Dr. Kumar (3/21)     Follow-up needed after discharge from SNF:      - PCP - 4/6  - pulmonary- needs to be scheduled- post COVID/COPD   - Endocrinology- needs to be scheduled- diabetes mellitus type 2       Future Appointments   Date Time Provider Department Center   3/21/2022 11:00 AM Isidra Kumar MD [facility]   4/6/2022  1:00 PM Basim Guerrero MD [facility]       Jazmín Zambrano NP  Department of Hospital Medicine   [facility] Skilled Nursing Facility     DOS: 3/14/2022       Patient note was created using MModal Dictation.  Any errors in syntax or even information may not have been identified and edited on initial review prior to signing this note.

## 2022-03-14 NOTE — PT/OT/SLP PROGRESS
"Physical Therapy Treatment    Patient Name:  Kamar Muñoz   MRN:  [id number]  Admit Date: 3/10/2022  Admitting Diagnosis: Hypotension  Recent Surgeries:     General Precautions: Standard, fall (previous COVID pt.)   Orthopedic Precautions:N/A   Braces:       Recommendations:     Discharge Recommendations:  home health PT   Level of Assistance Recommended at Discharge: Intermittent assistance   Discharge Equipment Recommendations: wheelchair, walker, rolling, bedside commode   Barriers to discharge: Decreased caregiver support (pt's kids work during the day)    Assessment:     Kamar Muñoz is a 76 y.o. male admitted with a medical diagnosis of Hypotension Pt tolerated well, pt would continue to benefit from skilled PT services to improve overall functional mobility, strength and endurance.      Performance deficits affecting function:  weakness, impaired endurance, impaired self care skills, impaired functional mobilty, gait instability, impaired balance, decreased lower extremity function, impaired cardiopulmonary response to activity .    Rehab Potential is good    Activity Tolerance: Fair    Plan:     Patient to be seen 6 x/week to address the above listed problems via gait training, therapeutic exercises, neuromuscular re-education, wheelchair management/training    · Plan of Care Expires: 04/10/22  · Plan of Care Reviewed with: patient    Subjective     "Ok".     Pain/Comfort:  · Pain Rating 1:  (did not rate laying in L SL)  · Location - Orientation 1: posterior  · Location 1:  (sacral)  · Pain Addressed 1: Reposition, Pre-medicate for activity, Distraction, Cessation of Activity, Nurse notified  · Pain Rating 2:  (no further mentioned)    Patient's cultural, spiritual, Shinto conflicts given the current situation:  · no    Objective:      Patient found with  (in bed) upon PT entry to room. A to pema pants standing EOB with RW CGA    Therapeutic Activities and Exercises: mini elliptical x 10 " min    Functional Mobility:  · Bed Mobility:     · Supine to Sit: minimum assistance and (pt very low in bed)  · Transfers:     · Sit to Stand:  contact guard assistance with rolling walker  · Gait: amb with RW CGA ~ 106 ft and 120 ft seated rest break no LOB wc follow  · Wheelchair Propulsion:  ~ 75 ft with BUE SBA vcs for tech    AM-PAC 6 CLICK MOBILITY  17    Patient left up in chair with call button in reach, PCT notified, belonging sin reach present and belongings in reach.    GOALS:   Multidisciplinary Problems     Physical Therapy Goals        Problem: Physical Therapy Goal    Goal Priority Disciplines Outcome Goal Variances Interventions   Physical Therapy Goal     PT, PT/OT Ongoing, Progressing     Description: Goals to be met by: 3/25/22    Patient will increase functional independence with mobility by performing:    . Supine to sit with [place]  . Sit to supine with [place]  . Rolling to Left and Right with [place].  . Sit to stand transfer with Supervision  . Bed to chair transfer with Supervision using No Assistive Device  . Gait  x 150 feet with Supervision using Rolling Walker.   . Wheelchair propulsion x150 feet with Supervision using bilateral uppper extremities  . Ascend/descend 4 stair with bilateral Handrails Contact Guard Assistance using No Assistive Device.   . Ascend/Descend 4 inch curb step with Contact Guard Assistance using Rolling Walker.  . Retrieve object off floor in standing with RW and reacher and SBA.                     Time Tracking:     PT Received On: 03/14/22  PT Total Time (min):       Billable Minutes: Gait Training 15, Therapeutic Activity 16 and Therapeutic Exercise 10    Treatment Type: Treatment  PT/PTA: PTA     PTA Visit Number: 2     03/14/2022

## 2022-03-15 LAB
POCT GLUCOSE: 232 MG/DL (ref 70–110)
POCT GLUCOSE: 351 MG/DL (ref 70–110)
POCT GLUCOSE: 405 MG/DL (ref 70–110)
POCT GLUCOSE: 413 MG/DL (ref 70–110)
POCT GLUCOSE: 439 MG/DL (ref 70–110)
POCT GLUCOSE: 457 MG/DL (ref 70–110)

## 2022-03-15 PROCEDURE — 97530 THERAPEUTIC ACTIVITIES: CPT | Mod: CQ

## 2022-03-15 PROCEDURE — 97110 THERAPEUTIC EXERCISES: CPT | Mod: CQ

## 2022-03-15 PROCEDURE — 25000003 PHARM REV CODE 250: Performed by: NURSE PRACTITIONER

## 2022-03-15 PROCEDURE — 97535 SELF CARE MNGMENT TRAINING: CPT | Mod: CO

## 2022-03-15 PROCEDURE — 11000004 HC SNF PRIVATE

## 2022-03-15 PROCEDURE — 25000003 PHARM REV CODE 250: Performed by: HOSPITALIST

## 2022-03-15 RX ADMIN — SUCRALFATE 1 G: 1 TABLET ORAL at 11:03

## 2022-03-15 RX ADMIN — CLOPIDOGREL 75 MG: 75 TABLET, FILM COATED ORAL at 08:03

## 2022-03-15 RX ADMIN — LACOSAMIDE 100 MG: 50 TABLET, FILM COATED ORAL at 09:03

## 2022-03-15 RX ADMIN — DICLOFENAC SODIUM 4 G: 10 GEL TOPICAL at 09:03

## 2022-03-15 RX ADMIN — INSULIN ASPART 6 UNITS: 100 INJECTION, SOLUTION INTRAVENOUS; SUBCUTANEOUS at 12:03

## 2022-03-15 RX ADMIN — GABAPENTIN 200 MG: 100 CAPSULE ORAL at 08:03

## 2022-03-15 RX ADMIN — SUCRALFATE 1 G: 1 TABLET ORAL at 08:03

## 2022-03-15 RX ADMIN — GABAPENTIN 200 MG: 100 CAPSULE ORAL at 09:03

## 2022-03-15 RX ADMIN — PANTOPRAZOLE SODIUM 40 MG: 40 TABLET, DELAYED RELEASE ORAL at 08:03

## 2022-03-15 RX ADMIN — INSULIN ASPART 5 UNITS: 100 INJECTION, SOLUTION INTRAVENOUS; SUBCUTANEOUS at 12:03

## 2022-03-15 RX ADMIN — ATORVASTATIN CALCIUM 40 MG: 20 TABLET, FILM COATED ORAL at 08:03

## 2022-03-15 RX ADMIN — Medication 400 MG: at 08:03

## 2022-03-15 RX ADMIN — SUCRALFATE 1 G: 1 TABLET ORAL at 05:03

## 2022-03-15 RX ADMIN — INSULIN ASPART 5 UNITS: 100 INJECTION, SOLUTION INTRAVENOUS; SUBCUTANEOUS at 08:03

## 2022-03-15 RX ADMIN — AMIODARONE HYDROCHLORIDE 200 MG: 200 TABLET ORAL at 08:03

## 2022-03-15 RX ADMIN — INSULIN ASPART 5 UNITS: 100 INJECTION, SOLUTION INTRAVENOUS; SUBCUTANEOUS at 05:03

## 2022-03-15 RX ADMIN — DICLOFENAC SODIUM 4 G: 10 GEL TOPICAL at 02:03

## 2022-03-15 RX ADMIN — Medication 500 MG: at 09:03

## 2022-03-15 RX ADMIN — Medication 400 MG: at 09:03

## 2022-03-15 RX ADMIN — LACOSAMIDE 100 MG: 50 TABLET, FILM COATED ORAL at 08:03

## 2022-03-15 RX ADMIN — INSULIN ASPART 6 UNITS: 100 INJECTION, SOLUTION INTRAVENOUS; SUBCUTANEOUS at 08:03

## 2022-03-15 RX ADMIN — Medication 500 MG: at 08:03

## 2022-03-15 RX ADMIN — THERA TABS 1 TABLET: TAB at 08:03

## 2022-03-15 RX ADMIN — INSULIN ASPART 6 UNITS: 100 INJECTION, SOLUTION INTRAVENOUS; SUBCUTANEOUS at 05:03

## 2022-03-15 RX ADMIN — OXYCODONE 5 MG: 5 TABLET ORAL at 08:03

## 2022-03-15 RX ADMIN — DICLOFENAC SODIUM 4 G: 10 GEL TOPICAL at 08:03

## 2022-03-15 RX ADMIN — ASPIRIN 81 MG: 81 TABLET, COATED ORAL at 08:03

## 2022-03-15 RX ADMIN — LOSARTAN POTASSIUM 25 MG: 25 TABLET, FILM COATED ORAL at 08:03

## 2022-03-15 RX ADMIN — LIDOCAINE 1 PATCH: 50 PATCH CUTANEOUS at 12:03

## 2022-03-15 RX ADMIN — GABAPENTIN 200 MG: 100 CAPSULE ORAL at 02:03

## 2022-03-15 RX ADMIN — APIXABAN 5 MG: 2.5 TABLET, FILM COATED ORAL at 09:03

## 2022-03-15 RX ADMIN — CETIRIZINE HYDROCHLORIDE 10 MG: 5 TABLET ORAL at 09:03

## 2022-03-15 RX ADMIN — APIXABAN 5 MG: 2.5 TABLET, FILM COATED ORAL at 08:03

## 2022-03-15 RX ADMIN — INSULIN ASPART 3 UNITS: 100 INJECTION, SOLUTION INTRAVENOUS; SUBCUTANEOUS at 09:03

## 2022-03-15 NOTE — PT/OT/SLP PROGRESS
Occupational Therapy   Treatment    Name: Kamar Muñoz  MRN: [id number]  Admit Date: 3/10/2022  Admitting Diagnosis:  <principal problem not specified>    General Precautions: Standard, fall   Orthopedic Precautions:N/A   Braces:       Recommendations:     Discharge Recommendations: home health OT  Level of Assistance Recommended at Discharge: Intermittent assistance for ADL's and homemaking tasks  Discharge Equipment Recommendations:  bedside commode, walker, rolling, wheelchair (W/C with 18x16 seat, swing away desk length arm rests and swing away fot rests with heel loops.)  Barriers to discharge:  Decreased caregiver support    Assessment:     Kamar Muñoz is a 76 y.o. male with a medical diagnosis of <principal problem not specified>.  He presents with  Performance deficits affecting function are weakness, impaired endurance, impaired self care skills, impaired functional mobilty, gait instability, impaired balance, decreased coordination, decreased upper extremity function, pain, decreased lower extremity function, impaired cardiopulmonary response to activity.     Pt. Was cooperative and participated well with session on this day. Pt  continues to demonstrate levels of physical deficits with  functional indep with daily management activities tasks, selfcare skills with balance,  functional mobility, UB strength and endurance. Pt. Will continue to benefit from continued OT to progress towards goals     Rehab Potential is fair    Activity tolerance:  Fair    Plan:     Patient to be seen 6 x/week to address the above listed problems via self-care/home management, therapeutic activities, therapeutic exercises    · Plan of Care Expires: 04/11/22  · Plan of Care Reviewed with: patient    Subjective     Communicated with: nsg and Pt  prior to session. I didn't sleep well last night     Pain/Comfort:  · Pain Rating 1: 7/10  · Location - Side 1: Bilateral  · Location - Orientation 1: posterior  · Location 1:   (sacral region)  · Pain Addressed 1: Pre-medicate for activity, Reposition    Patient's cultural, spiritual, Advent conflicts given the current situation:  · no    Objective:     Patient found supine    upon OT entry to room.    Bed Mobility:    · Patient completed Scooting/Bridging with stand by assistance  · Patient completed Supine to Sit with stand by assistance     Functional Mobility/Transfers:  · Patient completed Sit <> Stand Transfer with contact guard assistance  with  rolling walker   · Patient completed Bed <> Chair Transfer using Stand Pivot technique with contact guard assistance with rolling walker  · Patient completed Toilet Transfer Stand Pivot technique with contact guard assistance with  rolling walker    Activities of Daily Living:  · Grooming: stand by assistance seated at sink level  · Bathing: moderate assistance for BLE mangement and portion of post serina instace  · Upper Body Dressing: minimum assistance to doff/pema pull over shirt  · Lower Body Dressing: Moderate assistance  to pema pants seated to manage over BLE feet and (A) over hips instace with RW for bal and (A) for BLE socks  · Toileting: minimum assistance with cleaning and clothing management     [facility] 6 Click ADL: 17    Treatment & Education:  Pt edu on role of OT, POC, safety when performing self care tasks , benefit of performing OOB activity, and safety when performing functional transfers and mobility management for preparation with goals to progress towards next level of care    Patient left supine with all lines intact and call button in reachEducation:      GOALS:   Multidisciplinary Problems     Occupational Therapy Goals        Problem: Occupational Therapy Goal    Goal Priority Disciplines Outcome Interventions   Occupational Therapy Goal     OT, PT/OT Ongoing, Progressing    Description: Goals to be met by: 3/31/22     Patient will increase functional independence with ADLs by performing:    UE Dressing with Modified  [place].  LE Dressing with Set-up Assistance.  Grooming while seated at sink with Modified [place].  Toileting from toilet or BSC with Supervision for hygiene and clothing management.   Bathing from sitting on shower seat with Supervision.  Supine to sit with Modified [place].  Stand pivot transfers with Modified [place] using A/D as needed..  Toilet transfer to toilet or BSC with Modified [place] using A/D as needed  Upper extremity exercise using UBE with mod resistance for 10 minutes to increased functional   endurance to perform functional tasks and mobility.       Caregiver will be educated on level of assist required to safely perform self care tasks and functional transfers..                     Time Tracking:     OT Date of Treatment: OT Date of Treatment: 03/15/22  OT Total Time (min):      Billable Minutes:Self Care/Home Management 47    3/15/2022

## 2022-03-15 NOTE — PLAN OF CARE
Problem: Adult Inpatient Plan of Care  Goal: Plan of Care Review  Outcome: Ongoing, Progressing  Goal: Patient-Specific Goal (Individualized)  Outcome: Ongoing, Progressing  Goal: Absence of Hospital-Acquired Illness or Injury  Outcome: Ongoing, Progressing  Goal: Optimal Comfort and Wellbeing  Outcome: Ongoing, Progressing     Problem: Diabetes Comorbidity  Goal: Blood Glucose Level Within Targeted Range  Outcome: Ongoing, Progressing     Problem: Adjustment to Illness (Sepsis/Septic Shock)  Goal: Optimal Coping  Outcome: Ongoing, Progressing     Problem: Bleeding (Sepsis/Septic Shock)  Goal: Absence of Bleeding  Outcome: Ongoing, Progressing     Problem: Glycemic Control Impaired (Sepsis/Septic Shock)  Goal: Blood Glucose Level Within Desired Range  Outcome: Ongoing, Progressing     Problem: Infection Progression (Sepsis/Septic Shock)  Goal: Absence of Infection Signs and Symptoms  Outcome: Ongoing, Progressing     Problem: Nutrition Impaired (Sepsis/Septic Shock)  Goal: Optimal Nutrition Intake  Outcome: Ongoing, Progressing     Problem: Fluid and Electrolyte Imbalance (Acute Kidney Injury/Impairment)  Goal: Fluid and Electrolyte Balance  Outcome: Ongoing, Progressing     Problem: Oral Intake Inadequate (Acute Kidney Injury/Impairment)  Goal: Optimal Nutrition Intake  Outcome: Ongoing, Progressing     Problem: Renal Function Impairment (Acute Kidney Injury/Impairment)  Goal: Effective Renal Function  Outcome: Ongoing, Progressing     Problem: Impaired Wound Healing  Goal: Optimal Wound Healing  Outcome: Ongoing, Progressing     Problem: Fall Injury Risk  Goal: Absence of Fall and Fall-Related Injury  Outcome: Ongoing, Progressing     Problem: Skin Injury Risk Increased  Goal: Skin Health and Integrity  Outcome: Ongoing, Progressing     Problem: Malnutrition  Goal: Improved Nutritional Intake  Outcome: Ongoing, Progressing

## 2022-03-15 NOTE — PROGRESS NOTES
[facility] - Skilled Nursing  Wound Care    Patient Name:  Kamar Muñoz   MRN:  [id number]  Date: 3/15/2022  Diagnosis: <principal problem not specified>    History:     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 2012    STEMI involving right coronary artery 2022    Type II or unspecified type diabetes mellitus with neurological manifestations, uncontrolled(250.62) 3/8/2013    Urinary tract infection        Social History     Socioeconomic History    Marital status:    Tobacco Use    Smoking status: Former Smoker     Packs/day: 1.50     Years: 25.00     Pack years: 37.50     Quit date: 1983     Years since quittin.2    Smokeless tobacco: Never Used   Substance and Sexual Activity    Alcohol use: No    Drug use: No    Sexual activity: Yes     Partners: Female   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     Social Determinants of Health     Financial Resource Strain: Low Risk     Difficulty of Paying Living Expenses: Not hard at all   Food Insecurity: No Food Insecurity    Worried About Running Out of Food in the Last Year: Never true    Ran Out of Food in the Last Year: Never true   Transportation Needs: No Transportation Needs    Lack of Transportation (Medical): No    Lack of Transportation (Non-Medical): No   Housing Stability: Low Risk     Unable to Pay for Housing in the Last Year: No    Number of Places Lived in the Last Year: 1    Unstable Housing in the Last Year: No       Precautions:     Allergies as of 03/10/2022 - Reviewed 03/10/2022   Allergen Reaction Noted    Iodine  2012       WO Assessment Details/Treatment   Wound care consulted for sacrum and left buttocks  The left buttock has redness, partial thickness wound bed with ecchymotic skin  to serina-wound.   The coccyx is partial thickness with pink wound bed.   Both wounds have decreased in size from full thickness to partial thickness- IAD and friction/shearing     Plan:  Left buttock/coccyx- Triad ointment BID/prn cleansing  Frequent repositioning is the cornerstone to pressure prevention .      Nursing to continue care, pressure prevention measures  Wound care will follow up prn    Recommendations made to primary team for above plan per written report. Orders placed.      03/15/22 0750        Altered Skin Integrity 03/10/22 1710 upper Coccyx Ulceration Full thickness tissue loss. Subcutaneous fat may be visible but bone, tendon or muscle are not exposed   Date First Assessed/Time First Assessed: 03/10/22 1710   Altered Skin Integrity Present on Admission: yes  Orientation: upper  Location: Coccyx  Primary Wound Type: Ulceration  Description of Altered Skin Integrity: Full thickness tissue loss. Subcuta...   Wound Image    Description of Altered Skin Integrity Partial thickness tissue loss. Shallow open ulcer with a red or pink wound bed, without slough. Intact or Open/Ruptured Serum-filled blister.   Dressing Appearance Open to air   Drainage Amount None   Appearance Red;Dry;Moist   Tissue loss description Partial thickness   Periwound Area Ecchymotic;Excoriated;Redness   Wound Edges Irregular   Care Cleansed with:;Soap and water;Applied:;Skin Barrier        Altered Skin Integrity 03/10/22 1711 Left lower Buttocks Ulceration Full thickness tissue loss. Subcutaneous fat may be visible but bone, tendon or muscle are not exposed   Date First Assessed/Time First Assessed: 03/10/22 1711   Altered Skin Integrity Present on Admission: yes  Side: Left  Orientation: lower  Location: Buttocks  Primary Wound Type: Ulceration  Description of Altered Skin Integrity: Full thickness tissue...   Description of Altered Skin Integrity Partial thickness tissue loss. Shallow open ulcer with a red or pink wound bed,  without slough. Intact or Open/Ruptured Serum-filled blister.   Dressing Appearance Open to air   Drainage Amount None   Appearance Red;Dry   Tissue loss description Partial thickness   Periwound Area Dry;Ecchymotic;Redness   Wound Edges Irregular   Care Cleansed with:;Soap and water;Applied:;Skin Barrier     03/15/2022

## 2022-03-15 NOTE — PLAN OF CARE
Problem: Adult Inpatient Plan of Care  Goal: Plan of Care Review  3/15/2022 0135 by Effie Krishnamurthy LPN  Outcome: Ongoing, Progressing  3/15/2022 0134 by Effie Krishnamurthy LPN  Outcome: Ongoing, Progressing  Goal: Patient-Specific Goal (Individualized)  3/15/2022 0135 by Effie Krishnamurthy LPN  Outcome: Ongoing, Progressing  3/15/2022 0134 by Effie Krishnamurthy LPN  Outcome: Ongoing, Progressing  Goal: Absence of Hospital-Acquired Illness or Injury  3/15/2022 0135 by Effie Krishnamurthy LPN  Outcome: Ongoing, Progressing  3/15/2022 0134 by Effie Krishnamurthy LPN  Outcome: Ongoing, Progressing  Goal: Optimal Comfort and Wellbeing  3/15/2022 0135 by Effie Krishnamurthy LPN  Outcome: Ongoing, Progressing  3/15/2022 0134 by Effie Krishnamurthy LPN  Outcome: Ongoing, Progressing     Problem: Diabetes Comorbidity  Goal: Blood Glucose Level Within Targeted Range  3/15/2022 0135 by Effie Krishnamurthy LPN  Outcome: Ongoing, Progressing  3/15/2022 0134 by Effie Krishnamurthy LPN  Outcome: Ongoing, Progressing     Problem: Adjustment to Illness (Sepsis/Septic Shock)  Goal: Optimal Coping  3/15/2022 0135 by Effie Krishnamurthy LPN  Outcome: Ongoing, Progressing  3/15/2022 0134 by Effie Krishnamurthy LPN  Outcome: Ongoing, Progressing     Problem: Bleeding (Sepsis/Septic Shock)  Goal: Absence of Bleeding  3/15/2022 0135 by Effie Krishnamurthy LPN  Outcome: Ongoing, Progressing  3/15/2022 0134 by Effie Krishnamurthy LPN  Outcome: Ongoing, Progressing     Problem: Glycemic Control Impaired (Sepsis/Septic Shock)  Goal: Blood Glucose Level Within Desired Range  3/15/2022 0135 by Effie Krishnamurthy, LPN  Outcome: Ongoing, Progressing  3/15/2022 0134 by Effie Krishnamurthy, LPN  Outcome: Ongoing, Progressing     Problem: Infection Progression (Sepsis/Septic Shock)  Goal: Absence of Infection Signs and Symptoms  3/15/2022 0135 by Effie Krishnamurthy LPN  Outcome: Ongoing, Progressing  3/15/2022 0134 by Effie Krishnamurthy LPN  Outcome: Ongoing, Progressing     Problem: Nutrition Impaired  (Sepsis/Septic Shock)  Goal: Optimal Nutrition Intake  3/15/2022 0135 by Effie Betsym, LPN  Outcome: Ongoing, Progressing  3/15/2022 0134 by Effie Betsym, LPN  Outcome: Ongoing, Progressing     Problem: Fluid and Electrolyte Imbalance (Acute Kidney Injury/Impairment)  Goal: Fluid and Electrolyte Balance  3/15/2022 0135 by Effie Harominaohm, LPN  Outcome: Ongoing, Progressing  3/15/2022 0134 by Effie Krishnamurthy, LPN  Outcome: Ongoing, Progressing     Problem: Oral Intake Inadequate (Acute Kidney Injury/Impairment)  Goal: Optimal Nutrition Intake  3/15/2022 0135 by fEfie Krishnamurthy, LPN  Outcome: Ongoing, Progressing  3/15/2022 0134 by Effie Krishnamurthy, LPN  Outcome: Ongoing, Progressing     Problem: Renal Function Impairment (Acute Kidney Injury/Impairment)  Goal: Effective Renal Function  3/15/2022 0135 by Effie Krishnamurthy, LPN  Outcome: Ongoing, Progressing  3/15/2022 0134 by Effie Krishnamurthy, LPN  Outcome: Ongoing, Progressing     Problem: Impaired Wound Healing  Goal: Optimal Wound Healing  3/15/2022 0135 by Effie Krishnamurthy, LPN  Outcome: Ongoing, Progressing  3/15/2022 0134 by Effie Krishnamurthy, LPN  Outcome: Ongoing, Progressing     Problem: Fall Injury Risk  Goal: Absence of Fall and Fall-Related Injury  3/15/2022 0135 by Effie Krishnamurthy, LPN  Outcome: Ongoing, Progressing  3/15/2022 0134 by Effie Krishnamurthy, LPN  Outcome: Ongoing, Progressing     Problem: Skin Injury Risk Increased  Goal: Skin Health and Integrity  3/15/2022 0135 by Effie Krishnamurthy, LPN  Outcome: Ongoing, Progressing  3/15/2022 0134 by Effie Krishnamurthy, LPN  Outcome: Ongoing, Progressing     Problem: Malnutrition  Goal: Improved Nutritional Intake  3/15/2022 0135 by Effie Krishnamurthy, LPN  Outcome: Ongoing, Progressing  3/15/2022 0134 by Effie Raghu, LPN  Outcome: Ongoing, Progressing

## 2022-03-15 NOTE — PT/OT/SLP PROGRESS
"Physical Therapy Treatment    Patient Name:  Kamar Muñoz   MRN:  [id number]  Admit Date: 3/10/2022  Admitting Diagnosis: hypotension  Recent Surgeries:     General Precautions: Standard, fall (previous COVID pt.)   Orthopedic Precautions:N/A   Braces:       Recommendations:     Discharge Recommendations:  home health PT   Level of Assistance Recommended at Discharge: Intermittent assistance   Discharge Equipment Recommendations: wheelchair, walker, rolling, bedside commode   Barriers to discharge: Decreased caregiver support (pt's kids work during the day)    Assessment:     Kamar Muñoz is a 76 y.o. male admitted with a medical diagnosis of hypotension. Pt tolerated well, however pt reported an inc in pain and fatigue which limited his session. pt would continue to benefit from skilled PT services to improve overall functional mobility, strength and endurance.      Performance deficits affecting function:  weakness, impaired endurance, impaired self care skills, impaired functional mobilty, gait instability, impaired balance, decreased lower extremity function, impaired cardiopulmonary response to activity .    Rehab Potential is good    Activity Tolerance: Fair    Plan:     Patient to be seen 6 x/week to address the above listed problems via gait training, therapeutic exercises, neuromuscular re-education, wheelchair management/training    · Plan of Care Expires: 04/10/22  · Plan of Care Reviewed with: patient    Subjective     "I'm in a lot of pain today. I didn't sleep well." Pt agreeable to therex in AM and sitting up for lunch. (resume in PM, pt declined gait today)    Pain/Comfort:  · Pain Rating 1: 7/10  · Location - Side 1: Bilateral  · Location - Orientation 1: posterior  · Location 1:  (sacral region)  · Pain Addressed 1: Pre-medicate for activity, Reposition, Distraction, Cessation of Activity  · Pain Rating Post-Intervention 1: 7/10    Patient's cultural, spiritual, Yazidi conflicts given " the current situation:  · no    Objective:     Patient found supine with  (in bed) upon PT entry to room.     Therapeutic Activities and Exercises: 2x10 BLE GS, AP, QS, SAQ, HS, Hip Add/Abd    Functional Mobility:  · Bed Mobility:     · Rolling Right: minimum assistance with rail and HOB elevated  · Supine to Sit: contact guard assistance and minimum assistance with rail and HOB elevated with vcs  · Transfers:     · Bed to Chair: contact guard assistance and minimum assistance with  no AD  using  Stand Pivot and vcs for prop tech  · Balance: Seated EOB no LOB SBA/S    AM-PAC 6 CLICK MOBILITY  17    Patient left up in chair with call button in reach and belongings within reach. In PM pt in bed declined gait, positioned in RSL with wedge.    GOALS:   Multidisciplinary Problems     Physical Therapy Goals        Problem: Physical Therapy Goal    Goal Priority Disciplines Outcome Goal Variances Interventions   Physical Therapy Goal     PT, PT/OT Ongoing, Progressing     Description: Goals to be met by: 3/25/22    Patient will increase functional independence with mobility by performing:    . Supine to sit with [place]  . Sit to supine with [place]  . Rolling to Left and Right with [place].  . Sit to stand transfer with Supervision  . Bed to chair transfer with Supervision using No Assistive Device  . Gait  x 150 feet with Supervision using Rolling Walker.   . Wheelchair propulsion x150 feet with Supervision using bilateral uppper extremities  . Ascend/descend 4 stair with bilateral Handrails Contact Guard Assistance using No Assistive Device.   . Ascend/Descend 4 inch curb step with Contact Guard Assistance using Rolling Walker.  . Retrieve object off floor in standing with RW and reacher and SBA.                     Time Tracking:     PT Received On: 03/15/22  PT Total Time (min):       Billable Minutes: Therapeutic Activity 10 and Therapeutic Exercise 17    Treatment Type: Treatment  PT/PTA: PTA     PTA  Visit Number: 3     03/15/2022

## 2022-03-16 LAB
POCT GLUCOSE: 379 MG/DL (ref 70–110)
POCT GLUCOSE: 424 MG/DL (ref 70–110)
POCT GLUCOSE: 435 MG/DL (ref 70–110)
POCT GLUCOSE: 489 MG/DL (ref 70–110)
SARS-COV-2 RNA RESP QL NAA+PROBE: NOT DETECTED
SARS-COV-2- CYCLE NUMBER: NORMAL

## 2022-03-16 PROCEDURE — 97530 THERAPEUTIC ACTIVITIES: CPT | Mod: CQ

## 2022-03-16 PROCEDURE — 11000004 HC SNF PRIVATE

## 2022-03-16 PROCEDURE — 25000003 PHARM REV CODE 250: Performed by: HOSPITALIST

## 2022-03-16 PROCEDURE — 97116 GAIT TRAINING THERAPY: CPT | Mod: CQ

## 2022-03-16 PROCEDURE — 97535 SELF CARE MNGMENT TRAINING: CPT | Mod: CO

## 2022-03-16 PROCEDURE — 97530 THERAPEUTIC ACTIVITIES: CPT | Mod: CO

## 2022-03-16 PROCEDURE — 25000003 PHARM REV CODE 250: Performed by: NURSE PRACTITIONER

## 2022-03-16 PROCEDURE — 99306 PR NURSING FACILITY CARE, INIT, HIGH SEVERITY: ICD-10-PCS | Mod: AI,,, | Performed by: HOSPITALIST

## 2022-03-16 PROCEDURE — 97110 THERAPEUTIC EXERCISES: CPT | Mod: CQ

## 2022-03-16 PROCEDURE — 99306 1ST NF CARE HIGH MDM 50: CPT | Mod: AI,,, | Performed by: HOSPITALIST

## 2022-03-16 RX ORDER — INSULIN ASPART 100 [IU]/ML
9 INJECTION, SOLUTION INTRAVENOUS; SUBCUTANEOUS
Status: DISCONTINUED | OUTPATIENT
Start: 2022-03-16 | End: 2022-03-18

## 2022-03-16 RX ORDER — METFORMIN HYDROCHLORIDE 500 MG/1
500 TABLET ORAL 2 TIMES DAILY WITH MEALS
Status: DISCONTINUED | OUTPATIENT
Start: 2022-03-16 | End: 2022-03-23

## 2022-03-16 RX ADMIN — THERA TABS 1 TABLET: TAB at 08:03

## 2022-03-16 RX ADMIN — CLOPIDOGREL 75 MG: 75 TABLET, FILM COATED ORAL at 08:03

## 2022-03-16 RX ADMIN — INSULIN ASPART 9 UNITS: 100 INJECTION, SOLUTION INTRAVENOUS; SUBCUTANEOUS at 11:03

## 2022-03-16 RX ADMIN — METFORMIN HYDROCHLORIDE 500 MG: 500 TABLET ORAL at 05:03

## 2022-03-16 RX ADMIN — PANTOPRAZOLE SODIUM 40 MG: 40 TABLET, DELAYED RELEASE ORAL at 08:03

## 2022-03-16 RX ADMIN — DICLOFENAC SODIUM 4 G: 10 GEL TOPICAL at 08:03

## 2022-03-16 RX ADMIN — GABAPENTIN 200 MG: 100 CAPSULE ORAL at 08:03

## 2022-03-16 RX ADMIN — LACOSAMIDE 100 MG: 50 TABLET, FILM COATED ORAL at 08:03

## 2022-03-16 RX ADMIN — INSULIN ASPART 3 UNITS: 100 INJECTION, SOLUTION INTRAVENOUS; SUBCUTANEOUS at 12:03

## 2022-03-16 RX ADMIN — SUCRALFATE 1 G: 1 TABLET ORAL at 05:03

## 2022-03-16 RX ADMIN — APIXABAN 5 MG: 2.5 TABLET, FILM COATED ORAL at 08:03

## 2022-03-16 RX ADMIN — AMIODARONE HYDROCHLORIDE 200 MG: 200 TABLET ORAL at 08:03

## 2022-03-16 RX ADMIN — SENNOSIDES AND DOCUSATE SODIUM 1 TABLET: 50; 8.6 TABLET ORAL at 08:03

## 2022-03-16 RX ADMIN — LIDOCAINE 1 PATCH: 50 PATCH CUTANEOUS at 12:03

## 2022-03-16 RX ADMIN — INSULIN ASPART 5 UNITS: 100 INJECTION, SOLUTION INTRAVENOUS; SUBCUTANEOUS at 11:03

## 2022-03-16 RX ADMIN — INSULIN ASPART 5 UNITS: 100 INJECTION, SOLUTION INTRAVENOUS; SUBCUTANEOUS at 05:03

## 2022-03-16 RX ADMIN — INSULIN ASPART 3 UNITS: 100 INJECTION, SOLUTION INTRAVENOUS; SUBCUTANEOUS at 08:03

## 2022-03-16 RX ADMIN — INSULIN ASPART 9 UNITS: 100 INJECTION, SOLUTION INTRAVENOUS; SUBCUTANEOUS at 05:03

## 2022-03-16 RX ADMIN — OXYCODONE 5 MG: 5 TABLET ORAL at 08:03

## 2022-03-16 RX ADMIN — CETIRIZINE HYDROCHLORIDE 10 MG: 5 TABLET ORAL at 08:03

## 2022-03-16 RX ADMIN — ATORVASTATIN CALCIUM 40 MG: 20 TABLET, FILM COATED ORAL at 08:03

## 2022-03-16 RX ADMIN — DICLOFENAC SODIUM 4 G: 10 GEL TOPICAL at 03:03

## 2022-03-16 RX ADMIN — ASPIRIN 81 MG: 81 TABLET, COATED ORAL at 08:03

## 2022-03-16 RX ADMIN — INSULIN ASPART 6 UNITS: 100 INJECTION, SOLUTION INTRAVENOUS; SUBCUTANEOUS at 08:03

## 2022-03-16 RX ADMIN — SUCRALFATE 1 G: 1 TABLET ORAL at 08:03

## 2022-03-16 RX ADMIN — Medication 400 MG: at 08:03

## 2022-03-16 RX ADMIN — LOSARTAN POTASSIUM 25 MG: 25 TABLET, FILM COATED ORAL at 08:03

## 2022-03-16 RX ADMIN — Medication 500 MG: at 08:03

## 2022-03-16 RX ADMIN — GABAPENTIN 200 MG: 100 CAPSULE ORAL at 03:03

## 2022-03-16 RX ADMIN — SUCRALFATE 1 G: 1 TABLET ORAL at 01:03

## 2022-03-16 RX ADMIN — INSULIN ASPART 5 UNITS: 100 INJECTION, SOLUTION INTRAVENOUS; SUBCUTANEOUS at 08:03

## 2022-03-16 NOTE — NURSING
Received order from  Dr Frazier to cover patient with SS Insulin.Patient received 3 units SS insulin for  taken at 11;45PM.

## 2022-03-16 NOTE — NURSING
Patients HS .Covered with 3 units aspart as ordered.Asked if there were any other orders;awaiting response.

## 2022-03-16 NOTE — PT/OT/SLP PROGRESS
Occupational Therapy   Treatment    Name: Kamar Muñoz  MRN: [id number]  Admit Date: 3/10/2022  Admitting Diagnosis:  Encephalopathy, metabolic    General Precautions: Standard, fall   Orthopedic Precautions:N/A   Braces:       Recommendations:     Discharge Recommendations: home health OT  Level of Assistance Recommended at Discharge: Intermittent assistance for ADL's and homemaking tasks  Discharge Equipment Recommendations:  bedside commode, walker, rolling, wheelchair (W/C with 18x16 seat, swing away desk length arm rests and swing away fot rests with heel loops.)  Barriers to discharge:  Decreased caregiver support    Assessment:     Kamar Muñoz is a 76 y.o. male with a medical diagnosis of Encephalopathy, metabolic  He presents with  Performance deficits affecting function are weakness, impaired endurance, impaired self care skills, impaired functional mobilty, gait instability, impaired balance, decreased coordination, decreased upper extremity function, pain, decreased lower extremity function, impaired cardiopulmonary response to activity.     Pt. Was cooperative and participated well with session on this day. Pt  continues to demonstrate levels of physical deficits with  functional indep with daily management activities tasks, selfcare skills with balance,  functional mobility, UB strength and endurance. Pt. Will continue to benefit from continued OT to progress towards goals     Rehab Potential is fair    Activity tolerance:  Fair    Plan:     Patient to be seen 6 x/week to address the above listed problems via self-care/home management, therapeutic activities, therapeutic exercises    · Plan of Care Expires: 04/11/22  · Plan of Care Reviewed with: patient    Subjective     Communicated with: nsg and Pt prior to session.     Pain/Comfort:  · Pain Rating 1: 8/10  · Location - Side 1: Bilateral  · Location - Orientation 1: posterior  · Pain Addressed 1: Pre-medicate for activity,  Reposition    Patient's cultural, spiritual, Anabaptism conflicts given the current situation:  · no    Objective:     Patient found supine    upon OT entry to room.    Bed Mobility:    · Patient completed Supine to Sit with contact guard assistance  · Patient completed Sit to Supine with contact guard assistance     Functional Mobility/Transfers:  · Patient completed Sit <> Stand Transfer with minimum assistance  with  no assistive device   · Patient completed Bed <> Chair Transfer using Stand Pivot technique with contact guard assistance with grab bars(s)  · Patient completed Toilet Transfer Stand Pivot technique with contact guard assistance with  grab bars    Activities of Daily Living:  · Grooming: stand by assistance at sink level  · Toileting: minimum assistance with cleaning and clothing management     [facility] 6 Click ADL: 17      Treatment & Education:   Pt. With 2# dowel activity with 2x20 reps with  shd flex, bicep curls horz adb/add and forward flex motion to increase BUE ROM and strength,.   Pt. With therex performed to increase ROM, endurance selfcare task and fxl mobility for independence     Pt edu on role of OT, POC, safety when performing self care tasks , benefit of performing OOB activity, and safety when performing functional transfers and mobility management for preparation with goals to progress towards next level of care    Patient left supine with all lines intact and call button in reachEducation:      GOALS:   Multidisciplinary Problems     Occupational Therapy Goals        Problem: Occupational Therapy Goal    Goal Priority Disciplines Outcome Interventions   Occupational Therapy Goal     OT, PT/OT Ongoing, Progressing    Description: Goals to be met by: 3/31/22     Patient will increase functional independence with ADLs by performing:    UE Dressing with Modified [place].  LE Dressing with Set-up Assistance.  Grooming while seated at sink with Modified [place].  Toileting from  toilet or BSC with Supervision for hygiene and clothing management.   Bathing from sitting on shower seat with Supervision.  Supine to sit with Modified [place].  Stand pivot transfers with Modified [place] using A/D as needed..  Toilet transfer to toilet or BSC with Modified [place] using A/D as needed  Upper extremity exercise using UBE with mod resistance for 10 minutes to increased functional   endurance to perform functional tasks and mobility.       Caregiver will be educated on level of assist required to safely perform self care tasks and functional transfers..                     Time Tracking:     OT Date of Treatment: OT Date of Treatment: 03/16/22  OT Total Time (min):      Billable Minutes:Self Care/Home Management 17  Therapeutic Activity 10    3/16/2022

## 2022-03-16 NOTE — H&P
"Hospital Medicine  Skilled Nursing Facility   History and Physical Exam      Date of Service: 03/16/2022      Patient Name: Kamar Muñoz  MRN: [id number]  Admission Date: 3/10/2022  Attending Physician: Cody Snow MD  Primary Care Provider: Basim Guerrero MD  Code Status: DNR (Do Not Resuscitate)      Principal problem:  Encephalopathy, metabolic      Chief Complaint:   Chief Complaint   Patient presents with    Altered Mental Status     Admitted to [facility] for rehabilitation following hospital stay for altered mental status, low blood pressure, BRENDAN and COVID infection.           HPI:   "Kamar Muñoz is a 76 year old male with PMHx of CAD s/p 2 LAUREN in RCA (Jan 2022), HTN, HLD, p. A. 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  Admission to SNF for secondary weakness debility, continued insulin adjustments.       Patient originally presented to [facility] ED on 02/19 with vomiting and altered mental status, found to be COVID positive. " Patient's daughter at bedside reports the patient was recently in the ED on 2/17 for a mechanical fall after being discharged from rehab the same day. CTH and cervical spine revealed evolving late subacute infarct involving the right frontal lobe with questionable petechial hemorrhage. Vascular neurology evaluated the patient and cleared him for discharge from without any new interventions. He was also tested positive for COVID-19 and was discharged yesterday from the ED. He subsequently received one dose of sotrovimab infusion for COVID and was in a normal state of health until this morning when his daughter found him lethargic and barely responsive prompting her to bring him to the ED. She reports the patient had no complaints prior to developing his AMS. Of note, the patient was recently admitted to [facility] from 01/25 " "through 02/13 for AMS concerning for CVA, however he was treated for metabolic encephalopathy 2/2 to DKA/HHS, sepsis and BRENDAN. In the ED, he was placed on 6L NC,  CXR with intersitial opacities. He received ~4L of IVF, vancomycin, zosyn, initiated on insulin shifted for hyperkalemia and calcium for cardioprotection. ECG without noticeable ischemic changes. CTH without new changes.  Patient was admitted to the MICU for further management.  Placed on remdesivir and broad spectrum IV abx in addition to insulin per DKA protocol. In MICU: Weaned supp O2 to room air; Transitioned to subq insulin; Improvement of AMS. BRENDAN improving gradually. Pt had discussion w/ family in MICU and transitioned from full code to DNR/DNI. Pt w/ episode of CP and hypoglycemia upon stepdown. Ischemic work-up largely unremarkable with trop down trending from admission. Detemir dosing adjusted from BID to qhs. He had additional hypoglycemic episode upon re-uptitration of long-acting insulin from 12 to 15. Dosing decreased to 13u as patient was hyperglycemic to 230s w/12u qhs. Worsening hyperglycemia to 270s- insulin dose modified to 3u TID wm, and detemir 14u qhs. Episode of abdominal pain and diarrhea 02/25- appeared to be 2/2 antibiotic use. Antibiotics stopped 02/25. Pt w/persistent diarrhea- c diff studies sent- loperamide stopped. Pt was found to have St 3 sacral pressure ulcer. Patient reportedly hypotensive on 3/5 and endorsing nausea, septic work up ordered but ultimately unrevealing. Patient's BP responded after 2.5L of fluid. Presume hypovolemia was the source of his hypotension."  Blood glucose levels continue to be difficult to treat.  Patient was followed by Endocrinology.     Today, patient noted to have 24 hour blood glucose range of 123-425.  He endorses a mild productive cough and decreased appetite." per Jazmín Zambrano NP on 3/11/22.     He is doing well today. He is sitting on the edge of the bed eating supper. He denies any " nausea or vomiting. He is eating well. Having regular BMs.     Patient admitted with skilled services with PT and OT to improve functional status and ability to perform ADLs.       Past Medical History:   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 2012    STEMI involving right coronary artery 2022    Type II or unspecified type diabetes mellitus with neurological manifestations, uncontrolled(250.62) 3/8/2013    Urinary tract infection        Past Surgical History:   Past Surgical History:   Procedure Laterality Date    BACK SURGERY      CATARACT EXTRACTION W/  INTRAOCULAR LENS IMPLANT Right     Per Dr Romero note 2018    COLONOSCOPY N/A 2019    Procedure: COLONOSCOPY Suprep;  Surgeon: Anh Johnson MD;  Location: [facility] ENDO;  Service: Endoscopy;  Laterality: N/A;    EYE SURGERY      HERNIA REPAIR      LEFT HEART CATHETERIZATION Left 2022    Procedure: CATHETERIZATION, HEART, LEFT;  Surgeon: Will Hurst III, MD;  Location: [facility] CATH LAB/EP;  Service: Cardiology;  Laterality: Left;    renal stones      SHOULDER OPEN ROTATOR CUFF REPAIR         Social History:   Tobacco Use    Smoking status: Former Smoker     Packs/day: 1.50     Years: 25.00     Pack years: 37.50     Quit date: 1983     Years since quittin.2    Smokeless tobacco: Never Used   Substance and Sexual Activity    Alcohol use: No    Drug use: No    Sexual activity: Yes     Partners: Female       Family History:   Family History     Problem Relation (Age of Onset)    Diabetes Father          No current facility-administered medications on file prior to encounter.     Current Outpatient Medications on File Prior to Encounter   Medication Sig    albuterol (PROVENTIL/VENTOLIN HFA) 90 mcg/actuation inhaler Inhale 2 puffs into the lungs every  "4 (four) hours as needed for Wheezing (Pt states he will take it on his own. MDI placed by bedside.). Rescue    amiodarone (PACERONE) 200 MG Tab Take 1 tablet (200 mg total) by mouth once daily.    apixaban (ELIQUIS) 5 mg Tab Take 1 tablet (5 mg total) by mouth 2 (two) times daily.    aspirin (ECOTRIN) 81 MG EC tablet Take 81 mg by mouth once daily.    atorvastatin (LIPITOR) 40 MG tablet Take 1 tablet (40 mg total) by mouth once daily.    BD ULTRA-FINE SHORT PEN NEEDLE 31 gauge x 5/16" Ndle 3 (three) times daily.    blood sugar diagnostic Strp 1 strip by Misc.(Non-Drug; Combo Route) route 2 (two) times a day.    clopidogreL (PLAVIX) 75 mg tablet Take 1 tablet (75 mg total) by mouth once daily.    diclofenac sodium (VOLTAREN) 1 % Gel Apply 4 g topically 3 (three) times daily. Apply to sacrun closed skin/buttocks    ergocalciferol (ERGOCALCIFEROL) 50,000 unit Cap Take 1 capsule (50,000 Units total) by mouth every 7 days.    gabapentin (NEURONTIN) 100 MG capsule Take 2 capsules (200 mg total) by mouth 3 (three) times daily.    insulin aspart U-100 (NOVOLOG) 100 unit/mL (3 mL) InPn pen Inject 0-5 Units into the skin 4 (four) times daily with meals and nightly. **LOW CORRECTION DOSE**  Blood Glucose  mg/dL                                    151-200                0 unit                        201-250                1 units                      251-300                2 units                      301-350                3 units                     >350                     4 units                      Administer subcutaneously if needed at times designated by monitoring schedule.   DO NOT HOLD correction dose insulin for patients who are  NPO.  "HIGH ALERT MEDICATION" - Administer with meals or TF/TPN.    insulin aspart U-100 (NOVOLOG) 100 unit/mL (3 mL) InPn pen Inject 6 Units into the skin 3 (three) times daily with meals.    insulin glargine (LANTUS) 100 unit/mL injection Inject 6 Units into the skin every " "evening.    lacosamide (VIMPAT) 100 mg Tab Take 1 tablet (100 mg total) by mouth every 12 (twelve) hours.    lancets (ONETOUCH ULTRASOFT LANCETS) Misc 1 lancet by Misc.(Non-Drug; Combo Route) route 2 (two) times a day.    LIDOcaine (LIDODERM) 5 % Place 1 patch onto the skin once daily. Place patch to lower back. Leave on for 12 hours and remove for 12 hours.    loperamide (IMODIUM) 2 mg capsule Take 1 capsule (2 mg total) by mouth 3 (three) times daily as needed for Diarrhea.    losartan (COZAAR) 25 MG tablet Take 1 tablet (25 mg total) by mouth once daily.    magnesium oxide (MAG-OX) 400 mg (241.3 mg magnesium) tablet Take 1 tablet (400 mg total) by mouth 2 (two) times daily.    melatonin (MELATIN) 3 mg tablet Take 2 tablets (6 mg total) by mouth nightly as needed for Insomnia.    metFORMIN (GLUCOPHAGE) 500 MG tablet Take 1 tablet (500 mg total) by mouth 2 (two) times daily with meals.    MULTIVITAMIN ORAL Take 1 tablet by mouth once daily.     nitroGLYCERIN (NITROSTAT) 0.4 MG SL tablet Place 1 tablet (0.4 mg total) under the tongue every 5 (five) minutes as needed for Chest pain.    oxyCODONE (ROXICODONE) 5 MG immediate release tablet Take 1 tablet (5 mg total) by mouth every 6 (six) hours as needed for Pain.    pantoprazole (PROTONIX) 40 MG tablet Take 1 tablet (40 mg total) by mouth once daily.    pen needle, diabetic (PEN NEEDLE) 30 gauge x 5/16" Ndle 1 Units by Misc.(Non-Drug; Combo Route) route 3 (three) times daily.    polycarbophil (FIBERCON) 625 mg tablet Take 1 tablet by mouth once daily.     pulse oximeter (PULSE OXIMETER) device by Apply [facility] route 2 (two) times a day. Use twice daily at 8 AM and 3 PM and record the value in [facility] as directed.    senna-docusate 8.6-50 mg (PERICOLACE) 8.6-50 mg per tablet Take 1 tablet by mouth once daily.    sucralfate (CARAFATE) 1 gram tablet Take 1 tablet (1 g total) by mouth 3 (three) times daily before meals.    [DISCONTINUED] diltiaZEM " (CARDIZEM CD) 120 MG Cp24 Take 1 capsule (120 mg total) by mouth once daily.    [DISCONTINUED] metoprolol succinate (TOPROL-XL) 50 MG 24 hr tablet Take 1 tablet (50 mg total) by mouth once daily.       Allergies:   Review of patient's allergies indicates:   Allergen Reactions    Iodine      Other reaction(s): swelling  Other reaction(s): Itching  Other reaction(s): Rash       ROS:  Review of Systems   Constitutional: Negative for appetite change, chills and fever.   HENT: Negative for congestion and sore throat.    Respiratory: Negative for cough and shortness of breath.    Cardiovascular: Negative for chest pain and palpitations.   Gastrointestinal: Negative for abdominal pain, nausea and vomiting.   Genitourinary: Negative for dysuria and hematuria.   Musculoskeletal: Negative for arthralgias and back pain.   Skin: Negative for pallor and rash.   Allergic/Immunologic: Negative for environmental allergies and food allergies.   Neurological: Positive for weakness and numbness (chronic, feet). Negative for headaches.   Hematological: Does not bruise/bleed easily.   Psychiatric/Behavioral: Negative for sleep disturbance. The patient is not nervous/anxious.          Objective:  Temp:  [97.3 °F (36.3 °C)-97.7 °F (36.5 °C)]   Pulse:  [68-77]   Resp:  [18]   BP: (115-138)/(56-74)   SpO2:  [95 %]     Body mass index is 21.91 kg/m².      Physical Exam  Vitals and nursing note reviewed.   Constitutional:       General: He is not in acute distress.     Appearance: He is well-developed. He is not diaphoretic.   HENT:      Head: Normocephalic and atraumatic.      Right Ear: External ear normal.      Left Ear: External ear normal.      Mouth/Throat:      Mouth: Mucous membranes are moist.      Pharynx: Uvula midline. No oropharyngeal exudate.   Cardiovascular:      Rate and Rhythm: Normal rate and regular rhythm.      Heart sounds: S1 normal and S2 normal. No murmur heard.  Pulmonary:      Effort: Pulmonary effort is normal.  No respiratory distress.      Breath sounds: Normal breath sounds. No wheezing or rales.   Chest:   Breasts:      Right: No supraclavicular adenopathy.      Left: No supraclavicular adenopathy.       Abdominal:      General: Bowel sounds are normal. There is no distension.      Palpations: Abdomen is soft.      Tenderness: There is no abdominal tenderness. There is no guarding or rebound.   Musculoskeletal:         General: No tenderness.      Cervical back: Neck supple. No muscular tenderness.      Right lower leg: No edema.      Left lower leg: No edema.   Lymphadenopathy:      Cervical:      Right cervical: No superficial cervical adenopathy.     Left cervical: No superficial cervical adenopathy.      Upper Body:      Right upper body: No supraclavicular adenopathy.      Left upper body: No supraclavicular adenopathy.   Skin:     General: Skin is warm and dry.      Coloration: Skin is not pale.      Findings: Bruising present. No erythema.   Neurological:      Mental Status: He is alert and oriented to person, place, and time.      Motor: Weakness present. No tremor or seizure activity.   Psychiatric:         Mood and Affect: Mood normal.         Behavior: Behavior normal. Behavior is cooperative.         Thought Content: Thought content normal.         Significant Labs:   A1C:   Recent Labs   Lab 12/29/21  0810 01/26/22  1512   HGBA1C 12.3* 11.5*     TSH:   Recent Labs   Lab 01/25/22  1213   TSH 0.600     CBC:  Recent Labs   Lab 03/14/22  0523   WBC 6.23   HGB 11.1*   HCT 35.4*      MCV 90     BMP:  Recent Labs   Lab 03/14/22  0523   *   *   K 4.4   CL 99   CO2 27   BUN 13   CREATININE 0.9   CALCIUM 8.5*   MG 1.6   PHOS 2.8       Significant Imaging: I have reviewed all pertinent imaging results/findings completed during prior hospitalization.      Assessment and Plan:  Active Diagnoses:    Diagnosis Date Noted POA    PRINCIPAL PROBLEM:  Encephalopathy, metabolic [G93.41] 01/25/2022 Yes     Malnutrition of moderate degree [E44.0] 03/11/2022 Yes    Coronary artery disease involving native heart without angina pectoris [I25.10] 01/19/2022 Yes    Paroxysmal atrial fibrillation [I48.0] 01/12/2022 Yes     Chronic    Embolic stroke involving right middle cerebral artery [I63.411] 01/12/2022 Yes     Chronic    Type 2 diabetes mellitus with diabetic peripheral angiopathy without gangrene, with long-term current use of insulin [E11.51, Z79.4] 01/05/2022 Not Applicable    Chronic pulmonary heart disease [I27.9]  Yes    Centrilobular emphysema [J43.2] 12/12/2018 Yes    Type 2 diabetes mellitus with hyperglycemia, with long-term current use of insulin [E11.65, Z79.4] 03/08/2013 Not Applicable     Chronic    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 [E13.40] 08/02/2012 Yes    Hypertension associated with diabetes [E11.59, I15.2] 07/20/2012 Yes     Chronic      Problems Resolved During this Admission:       COVID-19 virus infection  Viral Pneumonia due to COVID-19  - COVID vaccinated with booster.   - Received 1 dose of sotrovimab infusion 02/18/2022  - Completed 5 days of remdesivir, had dexamethasone held initially due to DKA.  - Stable on room air  - Ended isolation on 3/9/2022  - continues to have mild productive cough     Type 2 diabetes mellitus with hyperglycemia, with long-term current use of insulin  - Per [facility], Patient with uncontrolled DM presenting with metabolic encephalopathy in the setting of DKA with coma. Suspect patient developed DKA in the setting of sepsis/ COVID-19- DKA protocol completed and DKA resolved in MICU and Pt stepped down on subq insulin.  Endocrine followed   - Diabetic diet, Accu-Cheks AC/HS  - home regimen with Levemir 20 units daily, NovoLog 9 units TID WM, metformin 1000 mg BID  - Continue insulin detemir 4 units BID and insulin aspart 6 units TID WM     Hypertension associated with diabetes  - home regimen with losartan 25mg, Toprol xl 50mg, diltiazem 120mg at home  -  continue losartan 25mg daily     Paroxysmal atrial fibrillation  - home Metoprolol XL 50 mg daily, diltiazem  mg daily and amiodarone 200 mg daily, apixaban 5mg BID  - continue amiodarone 200 mg daily, apixaban 5 mg BID.   - Holding metoprolol due to hypotension/bradycardia, holding diltiazem due to hypotension     Coronary artery disease involving native coronary artery of native heart with unstable angina pectoris  HLD  - Hx of CAD s/p placement of 2 LAUREN in RCA in 01/09/2022.   - Continue home ASA, Plavix and statin     Pulmonary nodules  - Has stable bilateral pulmonary nodules/masses with broad differential per outpatient pulmonology notes. No pathology report as none of the nodules appeared to be safe for biopsy given high risk of PTX is high with many adjacent bulla. Plan working on promoting functional independence for the time being with possible addressing of nodules in the future, which is highly dependent upon his overall functionality.     Chronic pulmonary heart disease  - Follows up with Pulmonary clinic in [place]. Last seen in December and was evaluated for pulmonary nodules. Deemed to have mild COPD per notes. Not on any maintenance therapy.  - Continue albuterol inhaler   - Needs follow-up with Pulmonary after discharge from SNF     Embolic stroke involving right middle cerebral artery  - Hx of CVA in Jan 2022.   - CT Head with evolving infarcts in right frontal and left cerebellar hemispheres.   - Has had issues with memory per family since CVA   - No concern for acute stroke this admit per discussion with Radiology.   - Continue home antiplatelets, statin and Eliquis     Focal seizures  - Continue home lancosamide      Pressure injury of buttock, unstageable  - Seen by wound care with [facility] started  - Continues to cause patient discomfort  - Wound Care Nurse to follow     Vitamin-D deficiency  - continue ergocalciferol 1000 units weekly     Peripheral neuropathy  - continue gabapentin 200 mg  TID     Malnutrition of moderate degree  - RD following, appreciate recommendations  - continue supplements as ordered     Debility   - Continue with PT/OT for gait training and strengthening and restoration of ADL's   - Encourage mobility, OOB in chair, and early ambulation as appropriate  - Fall precautions   - Monitor for bowel and bladder dysfunction  - Monitor for and prevent skin breakdown and pressure ulcers  - Continue DVT prophylaxis with apixaban       Anticipated Disposition:   Home with home health      Future Appointments   Date Time Provider Department Center   3/17/2022  7:15 AM [facility] SPEC LAB [facility] SPECLAB [facility]   3/21/2022 11:00 AM Isidra Kumar MD [facility] IMPRI [facility]   4/6/2022  1:00 PM Basim Guerrero MD [facility]       I certify that SNF services are required to be given on an inpatient basis because Kamar Muñoz needs for skilled nursing care and/or skilled rehabilitation are required on a daily basis and such services can only practically be provided in a skilled nursing facility setting and are for an ongoing condition for which she received inpatient care in the hospital.       Cody Snow MD  Department of Hospital Medicine   [facility] - Skilled Nursing

## 2022-03-16 NOTE — PT/OT/SLP PROGRESS
"Physical Therapy Treatment    Patient Name:  Kamar Muñoz   MRN:  [id number]  Admit Date: 3/10/2022  Admitting Diagnosis: hypotension  Recent Surgeries:     General Precautions: Standard, fall (previous COVID pt.)   Orthopedic Precautions:N/A   Braces:       Recommendations:     Discharge Recommendations:  home health PT   Level of Assistance Recommended at Discharge: Intermittent assistance   Discharge Equipment Recommendations: wheelchair, walker, rolling, bedside commode   Barriers to discharge: Decreased caregiver support (pt's kids work during the day)    Assessment:     Kamar Muñoz is a 76 y.o. male admitted with a medical diagnosis of hypotension. Pt tolerated well, demo'd improved ability to perform bed mobility and transfer safely, as well as increased overall endurance and strength. pt would continue to benefit from skilled PT services to improve overall functional mobility, strength and endurance..      Performance deficits affecting function:  weakness, impaired endurance, impaired self care skills, impaired functional mobilty, gait instability, impaired balance, decreased lower extremity function, impaired cardiopulmonary response to activity .    Rehab Potential is good    Activity Tolerance: Good    Plan:     Patient to be seen 6 x/week to address the above listed problems via gait training, therapeutic exercises, neuromuscular re-education, wheelchair management/training    · Plan of Care Expires: 04/10/22  · Plan of Care Reviewed with: patient    Subjective     "Yeah I'm ready. Why not?"     Pain/Comfort:  · Pain Rating 1: 7/10  · Location - Side 1: Bilateral  · Location - Orientation 1: posterior  · Location 1:  (sacral region)  · Pain Addressed 1: Reposition, Distraction, Cessation of Activity  · Pain Rating Post-Intervention 1: 7/10    Patient's cultural, spiritual, L[employer]-day conflicts given the current situation:  · no    Objective:     Patient found right sidelying upon PT entry to " "room.     Therapeutic Activities and Exercises: mini elliptical 10'    Functional Mobility:  · Bed Mobility: HOB flat no rail     · Rolling Left:  modified independence and supervision  · Rolling Right: modified independence and supervision  · Supine to Sit: modified independence and supervision  · Sit to Supine: modified independence and supervision  · Transfers:     · Sit to Stand:  supervision and stand by assistance with no AD  · Bed to Chair: stand by assistance with  no AD  using  Stand Pivot  · Stairs:  Pt ascended/descended 4 stair(s) with bilateral handrails with Contact Guard Assistance and demo for prop tech.   · Gait: amb with RW 20' including 4" curb step, then amb 75 ft with RW CGA for safety limited distance today due to fatigue from already completing stairs, demo and vcs for prop tech    AM-PAC 6 CLICK MOBILITY       Patient left right sidelying with call button in reach and belongings within reach.    GOALS:   Multidisciplinary Problems     Physical Therapy Goals        Problem: Physical Therapy Goal    Goal Priority Disciplines Outcome Goal Variances Interventions   Physical Therapy Goal     PT, PT/OT Ongoing, Progressing     Description: Goals to be met by: 3/25/22    Patient will increase functional independence with mobility by performing:    . Supine to sit with [place]  . Sit to supine with [place]  . Rolling to Left and Right with [place].  . Sit to stand transfer with Supervision  . Bed to chair transfer with Supervision using No Assistive Device  . Gait  x 150 feet with Supervision using Rolling Walker.   . Wheelchair propulsion x150 feet with Supervision using bilateral uppper extremities  . Ascend/descend 4 stair with bilateral Handrails Contact Guard Assistance using No Assistive Device.   . Ascend/Descend 4 inch curb step with Contact Guard Assistance using Rolling Walker.  . Retrieve object off floor in standing with RW and reacher and SBA.                     Time " Tracking:     PT Received On: 03/16/22  PT Total Time (min):       Billable Minutes: Gait Training 15, Therapeutic Activity 25 and Therapeutic Exercise 10    Treatment Type: Treatment  PT/PTA: PTA     PTA Visit Number: 4 03/16/2022

## 2022-03-16 NOTE — PLAN OF CARE
Problem: Adult Inpatient Plan of Care  Goal: Plan of Care Review  Outcome: Ongoing, Progressing  Goal: Patient-Specific Goal (Individualized)  Outcome: Ongoing, Progressing  Goal: Absence of Hospital-Acquired Illness or Injury  Outcome: Ongoing, Progressing  Goal: Optimal Comfort and Wellbeing  Outcome: Ongoing, Progressing     Problem: Diabetes Comorbidity  Goal: Blood Glucose Level Within Targeted Range  Outcome: Ongoing, Progressing     Problem: Adjustment to Illness (Sepsis/Septic Shock)  Goal: Optimal Coping  Outcome: Ongoing, Progressing     Problem: Bleeding (Sepsis/Septic Shock)  Goal: Absence of Bleeding  Outcome: Ongoing, Progressing     Problem: Glycemic Control Impaired (Sepsis/Septic Shock)  Goal: Blood Glucose Level Within Desired Range  Outcome: Ongoing, Progressing     Problem: Infection Progression (Sepsis/Septic Shock)  Goal: Absence of Infection Signs and Symptoms  Outcome: Ongoing, Progressing     Problem: Nutrition Impaired (Sepsis/Septic Shock)  Goal: Optimal Nutrition Intake  Outcome: Ongoing, Progressing     Problem: Fluid and Electrolyte Imbalance (Acute Kidney Injury/Impairment)  Goal: Fluid and Electrolyte Balance  Outcome: Ongoing, Progressing     Problem: Oral Intake Inadequate (Acute Kidney Injury/Impairment)  Goal: Optimal Nutrition Intake  Outcome: Ongoing, Progressing     Problem: Renal Function Impairment (Acute Kidney Injury/Impairment)  Goal: Effective Renal Function  Outcome: Ongoing, Progressing     Problem: Fall Injury Risk  Goal: Absence of Fall and Fall-Related Injury  Outcome: Ongoing, Progressing     Problem: Skin Injury Risk Increased  Goal: Skin Health and Integrity  Outcome: Ongoing, Progressing     Problem: Malnutrition  Goal: Improved Nutritional Intake  Outcome: Ongoing, Progressing

## 2022-03-17 ENCOUNTER — PATIENT OUTREACH (OUTPATIENT)
Dept: ADMINISTRATIVE | Facility: OTHER | Age: 77
End: 2022-03-17
Payer: MEDICARE

## 2022-03-17 ENCOUNTER — LAB VISIT (OUTPATIENT)
Dept: LAB | Facility: OTHER | Age: 77
End: 2022-03-17
Payer: MEDICARE

## 2022-03-17 DIAGNOSIS — Z20.822 ENCOUNTER FOR LABORATORY TESTING FOR COVID-19 VIRUS: ICD-10-CM

## 2022-03-17 LAB
ANION GAP SERPL CALC-SCNC: 11 MMOL/L (ref 8–16)
BASOPHILS # BLD AUTO: 0.05 K/UL (ref 0–0.2)
BASOPHILS NFR BLD: 0.7 % (ref 0–1.9)
BUN SERPL-MCNC: 18 MG/DL (ref 8–23)
CALCIUM SERPL-MCNC: 8.9 MG/DL (ref 8.7–10.5)
CHLORIDE SERPL-SCNC: 100 MMOL/L (ref 95–110)
CO2 SERPL-SCNC: 29 MMOL/L (ref 23–29)
CREAT SERPL-MCNC: 0.9 MG/DL (ref 0.5–1.4)
DIFFERENTIAL METHOD: ABNORMAL
EOSINOPHIL # BLD AUTO: 0.4 K/UL (ref 0–0.5)
EOSINOPHIL NFR BLD: 6 % (ref 0–8)
ERYTHROCYTE [DISTWIDTH] IN BLOOD BY AUTOMATED COUNT: 18.1 % (ref 11.5–14.5)
EST. GFR  (AFRICAN AMERICAN): >60 ML/MIN/1.73 M^2
EST. GFR  (NON AFRICAN AMERICAN): >60 ML/MIN/1.73 M^2
GLUCOSE SERPL-MCNC: 131 MG/DL (ref 70–110)
HCT VFR BLD AUTO: 36.6 % (ref 40–54)
HGB BLD-MCNC: 11.8 G/DL (ref 14–18)
IMM GRANULOCYTES # BLD AUTO: 0.02 K/UL (ref 0–0.04)
IMM GRANULOCYTES NFR BLD AUTO: 0.3 % (ref 0–0.5)
LYMPHOCYTES # BLD AUTO: 1.7 K/UL (ref 1–4.8)
LYMPHOCYTES NFR BLD: 25 % (ref 18–48)
MAGNESIUM SERPL-MCNC: 1.7 MG/DL (ref 1.6–2.6)
MCH RBC QN AUTO: 28.5 PG (ref 27–31)
MCHC RBC AUTO-ENTMCNC: 32.2 G/DL (ref 32–36)
MCV RBC AUTO: 88 FL (ref 82–98)
MONOCYTES # BLD AUTO: 0.8 K/UL (ref 0.3–1)
MONOCYTES NFR BLD: 11.4 % (ref 4–15)
NEUTROPHILS # BLD AUTO: 3.8 K/UL (ref 1.8–7.7)
NEUTROPHILS NFR BLD: 56.6 % (ref 38–73)
NRBC BLD-RTO: 0 /100 WBC
PHOSPHATE SERPL-MCNC: 3.3 MG/DL (ref 2.7–4.5)
PLATELET # BLD AUTO: 291 K/UL (ref 150–450)
PMV BLD AUTO: 9.8 FL (ref 9.2–12.9)
POCT GLUCOSE: 114 MG/DL (ref 70–110)
POCT GLUCOSE: 124 MG/DL (ref 70–110)
POCT GLUCOSE: 136 MG/DL (ref 70–110)
POCT GLUCOSE: 190 MG/DL (ref 70–110)
POTASSIUM SERPL-SCNC: 3.9 MMOL/L (ref 3.5–5.1)
RBC # BLD AUTO: 4.14 M/UL (ref 4.6–6.2)
SARS-COV-2 RNA RESP QL NAA+PROBE: NOT DETECTED
SARS-COV-2- CYCLE NUMBER: NORMAL
SODIUM SERPL-SCNC: 140 MMOL/L (ref 136–145)
WBC # BLD AUTO: 6.67 K/UL (ref 3.9–12.7)

## 2022-03-17 PROCEDURE — 85025 COMPLETE CBC W/AUTO DIFF WBC: CPT | Performed by: HOSPITALIST

## 2022-03-17 PROCEDURE — 83735 ASSAY OF MAGNESIUM: CPT | Performed by: HOSPITALIST

## 2022-03-17 PROCEDURE — 36415 COLL VENOUS BLD VENIPUNCTURE: CPT | Performed by: HOSPITALIST

## 2022-03-17 PROCEDURE — 97116 GAIT TRAINING THERAPY: CPT | Mod: CQ

## 2022-03-17 PROCEDURE — U0003 INFECTIOUS AGENT DETECTION BY NUCLEIC ACID (DNA OR RNA); SEVERE ACUTE RESPIRATORY SYNDROME CORONAVIRUS 2 (SARS-COV-2) (CORONAVIRUS DISEASE [COVID-19]), AMPLIFIED PROBE TECHNIQUE, MAKING USE OF HIGH THROUGHPUT TECHNOLOGIES AS DESCRIBED BY CMS-2020-01-R: HCPCS | Performed by: EMERGENCY MEDICINE

## 2022-03-17 PROCEDURE — 80048 BASIC METABOLIC PNL TOTAL CA: CPT | Performed by: HOSPITALIST

## 2022-03-17 PROCEDURE — 97535 SELF CARE MNGMENT TRAINING: CPT

## 2022-03-17 PROCEDURE — 25000003 PHARM REV CODE 250: Performed by: NURSE PRACTITIONER

## 2022-03-17 PROCEDURE — 97110 THERAPEUTIC EXERCISES: CPT

## 2022-03-17 PROCEDURE — 97530 THERAPEUTIC ACTIVITIES: CPT | Mod: CQ

## 2022-03-17 PROCEDURE — 11000004 HC SNF PRIVATE

## 2022-03-17 PROCEDURE — 84100 ASSAY OF PHOSPHORUS: CPT | Performed by: HOSPITALIST

## 2022-03-17 PROCEDURE — 25000003 PHARM REV CODE 250: Performed by: HOSPITALIST

## 2022-03-17 RX ORDER — POLYETHYLENE GLYCOL 3350 17 G/17G
17 POWDER, FOR SOLUTION ORAL DAILY
Status: DISCONTINUED | OUTPATIENT
Start: 2022-03-17 | End: 2022-04-01 | Stop reason: HOSPADM

## 2022-03-17 RX ADMIN — GABAPENTIN 200 MG: 100 CAPSULE ORAL at 03:03

## 2022-03-17 RX ADMIN — LACOSAMIDE 100 MG: 50 TABLET, FILM COATED ORAL at 09:03

## 2022-03-17 RX ADMIN — APIXABAN 5 MG: 2.5 TABLET, FILM COATED ORAL at 09:03

## 2022-03-17 RX ADMIN — DICLOFENAC SODIUM 4 G: 10 GEL TOPICAL at 03:03

## 2022-03-17 RX ADMIN — INSULIN ASPART 9 UNITS: 100 INJECTION, SOLUTION INTRAVENOUS; SUBCUTANEOUS at 08:03

## 2022-03-17 RX ADMIN — ASPIRIN 81 MG: 81 TABLET, COATED ORAL at 09:03

## 2022-03-17 RX ADMIN — SUCRALFATE 1 G: 1 TABLET ORAL at 01:03

## 2022-03-17 RX ADMIN — OXYCODONE 5 MG: 5 TABLET ORAL at 09:03

## 2022-03-17 RX ADMIN — CLOPIDOGREL 75 MG: 75 TABLET, FILM COATED ORAL at 09:03

## 2022-03-17 RX ADMIN — METFORMIN HYDROCHLORIDE 500 MG: 500 TABLET ORAL at 04:03

## 2022-03-17 RX ADMIN — DICLOFENAC SODIUM 4 G: 10 GEL TOPICAL at 09:03

## 2022-03-17 RX ADMIN — INSULIN ASPART 9 UNITS: 100 INJECTION, SOLUTION INTRAVENOUS; SUBCUTANEOUS at 05:03

## 2022-03-17 RX ADMIN — Medication 500 MG: at 09:03

## 2022-03-17 RX ADMIN — PANTOPRAZOLE SODIUM 40 MG: 40 TABLET, DELAYED RELEASE ORAL at 09:03

## 2022-03-17 RX ADMIN — SUCRALFATE 1 G: 1 TABLET ORAL at 05:03

## 2022-03-17 RX ADMIN — GABAPENTIN 200 MG: 100 CAPSULE ORAL at 09:03

## 2022-03-17 RX ADMIN — Medication 400 MG: at 09:03

## 2022-03-17 RX ADMIN — AMIODARONE HYDROCHLORIDE 200 MG: 200 TABLET ORAL at 09:03

## 2022-03-17 RX ADMIN — POLYETHYLENE GLYCOL 3350 17 G: 17 POWDER, FOR SOLUTION ORAL at 03:03

## 2022-03-17 RX ADMIN — THERA TABS 1 TABLET: TAB at 09:03

## 2022-03-17 RX ADMIN — CETIRIZINE HYDROCHLORIDE 10 MG: 5 TABLET ORAL at 09:03

## 2022-03-17 RX ADMIN — SENNOSIDES AND DOCUSATE SODIUM 1 TABLET: 50; 8.6 TABLET ORAL at 09:03

## 2022-03-17 RX ADMIN — INSULIN ASPART 9 UNITS: 100 INJECTION, SOLUTION INTRAVENOUS; SUBCUTANEOUS at 11:03

## 2022-03-17 RX ADMIN — LOSARTAN POTASSIUM 25 MG: 25 TABLET, FILM COATED ORAL at 09:03

## 2022-03-17 RX ADMIN — METFORMIN HYDROCHLORIDE 500 MG: 500 TABLET ORAL at 07:03

## 2022-03-17 RX ADMIN — ATORVASTATIN CALCIUM 40 MG: 20 TABLET, FILM COATED ORAL at 09:03

## 2022-03-17 RX ADMIN — SUCRALFATE 1 G: 1 TABLET ORAL at 08:03

## 2022-03-17 NOTE — PROGRESS NOTES
[facility]                                  Skilled Nursing Facility                   Progress Note     Admit Date: 3/10/2022  ALLIE 3/25/2022  Principal Problem:  Encephalopathy, metabolic   HPI obtained from patient interview and chart review     Chief Complaint: Re-evaluation of medical treatment and therapy status: Lab review, hyperglycemia    HPI:   Kamar Muñoz is a 76 year old male with PMHx of CAD s/p 2 LAUREN in RCA (Jan 2022), HTN, HLD, p. A. 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  Admission to SNF for secondary weakness debility, continued insulin adjustments     Interval history: All of today's labs reviewed and are listed below.  Mag 1.7.  24 hr vital sign ranges listed below.  24 hour blood glucose range is 142-379   Patient states he has been eating consistently with each meal.  Continues to endorse discomfort due to skin breakdown to buttocks.  Patient denies shortness of breath, abdominal discomfort, nausea, or vomiting.  Patient reports an adequate appetite.  Patient denies dysuria.  Patient reports having regular bowel movements.  Patient progessing with PT/OT- Gait: x 176', RW at CGA/SBA. Focus on wide NICOLE and keeping AD close to pt's body. Continuing to follow and treat all acute and chronic conditions.    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    Past Surgical History: Patient has a past surgical  history that includes Back surgery; Eye surgery; Shoulder open rotator cuff repair; renal stones; Hernia repair; Colonoscopy (N/A, 1/28/2019); Cataract extraction w/  intraocular lens implant (Right); and Left heart catheterization (Left, 1/9/2022).    Social History: Patient reports that he quit smoking about 39 years ago. He has a 37.50 pack-year smoking history. He has never used smokeless tobacco. He reports that he does not drink alcohol and does not use drugs.    Family History:  No significant family history to report.    Allergies: Patient is allergic to iodine.    ROS  Constitutional: Negative for fever.  +appetite change   Eyes: Negative for blurred vision, double vision   Respiratory: Negative for shortness of breath.  +mild productive cough, nasal congestion   Cardiovascular: Negative for chest pain, palpitations, and leg swelling.   Gastrointestinal: Negative for abdominal pain, constipation, diarrhea, nausea, vomiting.   Genitourinary: Negative for dysuria, frequency   Musculoskeletal:  + generalized weakness. Negative for back pain and myalgias.   Skin: Negative for itching and rash.   Neurological: Negative for dizziness, headaches.   Psychiatric/Behavioral: Negative for depression. The patient is not nervous/anxious.      24 hour Vital Sign Range   Temp:  [97.8 °F (36.6 °C)]   Pulse:  [81-83]   Resp:  [18]   BP: (121-138)/(63-72)   SpO2:  [95 %-96 %]     PEx  Constitutional: Patient appears debilitated.  No distress noted  HENT:   Head: Normocephalic and atraumatic.   Eyes: Pupils are equal, round  Neck: Normal range of motion. Neck supple.   Cardiovascular: Normal rate, regular rhythm and normal heart sounds.    Pulmonary/Chest: Effort normal and breath sounds are clear  Abdominal: Soft. Bowel sounds are normal.   Musculoskeletal: Normal range of motion.   Neurological: Alert and oriented to person, place, and time.   Psychiatric: Normal mood and affect. Behavior is normal.   Skin: Skin is warm and  dry.    Wound that you did not assess today:  Wound location(s)/type(s):  Buttocks  Not visualized today. No signs/symptoms of infection or wound-related changes reported.       Recent Labs   Lab 03/17/22  0514      K 3.9      CO2 29   BUN 18   CREATININE 0.9   MG 1.7       Recent Labs   Lab 03/17/22  0514   WBC 6.67   RBC 4.14*   HGB 11.8*   HCT 36.6*      MCV 88   MCH 28.5   MCHC 32.2         Assessment and Plan:       Type 2 diabetes mellitus with hyperglycemia, with long-term current use of insulin  - [place] [facility] with uncontrolled DM presenting with metabolic encephalopathy in the setting of DKA with coma. Suspect patient developed DKA in the setting of sepsis/ COVID-19- DKA protocol completed and DKA resolved in MICU and Pt stepped down on subq insulin.  Endocrine followed   - Diabetic diet, Accu-Cheks AC/HS  - home regimen with Levemir 20 units daily, NovoLog 9 units TID WM, metformin 1000 mg BID  - 3/17 change detemir to 10 units BID, continue aspart 6 units TID WM    Hypomagnesemia  - initiated magnesium oxide 400 mg BID x2 days    Nasal congestion  - continue Zyrtec 10 mg qHS., Flonase daily    COVID-19 virus infection  Viral Pneumonia due to COVID-19  - COVID vaccinated with booster.   - Received 1 dose of sotrovimab infusion 02/18/2022  - Completed 5 days of remdesivir, had dexamethasone held initially due to DKA.  - Stable on room air  - End isolation on 3/9/2022  - continues to have mild productive cough   - initiated PRN DuoNebs     Hypertension associated with diabetes  - home regimen with losartan 25mg, Toprol xl 50mg, diltiazem 120mg at home  - continue losartan 25mg daily     Paroxysmal atrial fibrillation  - home Metoprolol XL 50 mg daily, diltiazem  mg daily and amiodarone 200 mg daily, apixaban 5mg BID  - continue amiodarone 20 mg daily, apixaban 5 mg BID. Holding metoprolol due to hypotension/bradycardia, holding diltiazem due to hypotension     Coronary artery  disease involving native coronary artery of native heart with unstable angina pectoris  HLD  - Hx of CAD s/p placement of 2 LAUREN in RCA in 01/09/2022.   - Continue home ASA, Plavix and statin  - Chest pain 3/4 after breakfast; EKG nonischemic. PPI, Tums, carafate added     Pulmonary nodules  - Has stable bilateral pulmonary nodules/masses with broad differential per outpatient pulmonology notes. No pathology report as none of the nodules appeared to be safe for biopsy given high risk of PTX is high with many adjacent bulla. Plan working on promoting functional independence for the time being with possible addressing of nodules in the future, which is highly dependent upon his overall functionality.     Chronic pulmonary heart disease  - Follows up with Pulmonary clinic in [place]. Last seen in December and was evaluated for pulmonary nodules. Deemed to have mild COPD per notes. Not on any maintenance therapy.  - Continue albuterol inhaler   - likely needs follow-up with Pulmonary after discharge from [facility]     Embolic stroke involving right middle cerebral artery  - Hx of CVA in Jan 2022.   - CT Head with evolving infarcts in right frontal and left cerebellar hemispheres.   - Has had issues with memory per family since CVA   - No concern for acute stroke this admit per discussion with Radiology.   - Continue home antiplatelets, statin and Eliquis     Focal seizures  - Continue home lancosamide      Pressure injury of buttock, unstageable  - Seen by wound care with [facility]  - Continues to cause patient discomfort  - initiate wound care consult at [facility]     Vitamin-D deficiency  - continue ergocalciferol 1000 units weekly     Hypomagnesemia  - magnesium oxide 400 mg BID      Peripheral neuropathy  - continue gabapentin 200 mg TID     Malnutrition of moderate degree  - RD following, appreciate recommendations, continue supplements as ordered     Debility   - Continue with PT/OT for gait training and strengthening and  restoration of ADL's   - Encourage mobility, OOB in chair, and early ambulation as appropriate  - Fall precautions   - Monitor for bowel and bladder dysfunction  - Monitor for and prevent skin breakdown and pressure ulcers  - Continue DVT prophylaxis with apixaban        Anticipate disposition:  Home with home health        Follow-up needed during SNF stay-     Appointment not to send patient to- Dr. Kumar (3/21)     Follow-up needed after discharge from SNF:      - PCP - 4/6  - pulmonary- needs to be scheduled- post COVID/COPD   - Endocrinology- needs to be scheduled- diabetes mellitus type 2       Future Appointments   Date Time Provider Department Center   3/21/2022 11:00 AM Isidra Kumar MD [facility]   4/6/2022  1:00 PM Basim Guerrero MD [facility]       Jazmín Zambrano NP  Department of Hospital Medicine   [facility]     DOS: 3/17/2022       Patient note was created using MModal Dictation.  Any errors in syntax or even information may not have been identified and edited on initial review prior to signing this note.

## 2022-03-17 NOTE — PLAN OF CARE
Problem: Occupational Therapy Goal  Goal: Occupational Therapy Goal  Description: Goals to be met by: 3/31/22     Patient will increase functional independence with ADLs by performing:    UE Dressing with Modified [place].  LE Dressing with Set-up Assistance.  Grooming while seated at sink with Modified [place]. Ongoing  Toileting from toilet or BSC with Supervision for hygiene and clothing management.   Bathing from sitting on shower seat with Supervision.  Supine to sit with Modified [place].  Stand pivot transfers with Modified [place] using A/D as needed..  Toilet transfer to toilet or BSC with Modified [place] using A/D as needed  Upper extremity exercise using UBE with mod resistance for 10 minutes to increased functional   endurance to perform functional tasks and mobility.     Ongoing  Caregiver will be educated on level of assist required to safely perform self care tasks and functional transfers..    Outcome: Ongoing, Progressing

## 2022-03-17 NOTE — PLAN OF CARE
Problem: Adult Inpatient Plan of Care  Goal: Plan of Care Review  Outcome: Ongoing, Progressing  Goal: Patient-Specific Goal (Individualized)  Outcome: Ongoing, Progressing  Goal: Absence of Hospital-Acquired Illness or Injury  Outcome: Ongoing, Progressing  Goal: Optimal Comfort and Wellbeing  Outcome: Ongoing, Progressing  Goal: Readiness for Transition of Care  Outcome: Ongoing, Progressing     Problem: Diabetes Comorbidity  Goal: Blood Glucose Level Within Targeted Range  Outcome: Ongoing, Progressing     Problem: Adjustment to Illness (Sepsis/Septic Shock)  Goal: Optimal Coping  Outcome: Ongoing, Progressing     Problem: Bleeding (Sepsis/Septic Shock)  Goal: Absence of Bleeding  Outcome: Ongoing, Progressing     Problem: Glycemic Control Impaired (Sepsis/Septic Shock)  Goal: Blood Glucose Level Within Desired Range  Outcome: Ongoing, Progressing     Problem: Infection Progression (Sepsis/Septic Shock)  Goal: Absence of Infection Signs and Symptoms  Outcome: Ongoing, Progressing     Problem: Nutrition Impaired (Sepsis/Septic Shock)  Goal: Optimal Nutrition Intake  Outcome: Ongoing, Progressing     Problem: Fluid and Electrolyte Imbalance (Acute Kidney Injury/Impairment)  Goal: Fluid and Electrolyte Balance  Outcome: Ongoing, Progressing     Problem: Oral Intake Inadequate (Acute Kidney Injury/Impairment)  Goal: Optimal Nutrition Intake  Outcome: Ongoing, Progressing     Problem: Renal Function Impairment (Acute Kidney Injury/Impairment)  Goal: Effective Renal Function  Outcome: Ongoing, Progressing     Problem: Impaired Wound Healing  Goal: Optimal Wound Healing  Outcome: Ongoing, Progressing     Problem: Fall Injury Risk  Goal: Absence of Fall and Fall-Related Injury  Outcome: Ongoing, Progressing     Problem: Skin Injury Risk Increased  Goal: Skin Health and Integrity  Outcome: Ongoing, Progressing     Problem: Malnutrition  Goal: Improved Nutritional Intake  Outcome: Ongoing, Progressing

## 2022-03-17 NOTE — PLAN OF CARE
Problem: Adult Inpatient Plan of Care  Goal: Plan of Care Review  Outcome: Ongoing, Progressing  Goal: Patient-Specific Goal (Individualized)  Outcome: Ongoing, Progressing     Problem: Diabetes Comorbidity  Goal: Blood Glucose Level Within Targeted Range  Outcome: Ongoing, Not Progressing     Problem: Adjustment to Illness (Sepsis/Septic Shock)  Goal: Optimal Coping  Outcome: Ongoing, Progressing     Problem: Bleeding (Sepsis/Septic Shock)  Goal: Absence of Bleeding  Outcome: Ongoing, Progressing     Problem: Glycemic Control Impaired (Sepsis/Septic Shock)  Goal: Blood Glucose Level Within Desired Range  Outcome: Ongoing, Progressing     Problem: Infection Progression (Sepsis/Septic Shock)  Goal: Absence of Infection Signs and Symptoms  Outcome: Ongoing, Progressing     Problem: Nutrition Impaired (Sepsis/Septic Shock)  Goal: Optimal Nutrition Intake  Outcome: Ongoing, Progressing     Problem: Fluid and Electrolyte Imbalance (Acute Kidney Injury/Impairment)  Goal: Fluid and Electrolyte Balance  Outcome: Ongoing, Progressing     Problem: Oral Intake Inadequate (Acute Kidney Injury/Impairment)  Goal: Optimal Nutrition Intake  Outcome: Ongoing, Progressing     Problem: Renal Function Impairment (Acute Kidney Injury/Impairment)  Goal: Effective Renal Function  Outcome: Ongoing, Progressing     Problem: Impaired Wound Healing  Goal: Optimal Wound Healing  Outcome: Ongoing, Progressing     Problem: Fall Injury Risk  Goal: Absence of Fall and Fall-Related Injury  Outcome: Ongoing, Progressing     Problem: Skin Injury Risk Increased  Goal: Skin Health and Integrity  Outcome: Ongoing, Progressing     Problem: Malnutrition  Goal: Improved Nutritional Intake  Outcome: Ongoing, Progressing

## 2022-03-17 NOTE — PROGRESS NOTES
Health Maintenance Due   Topic Date Due    Foot Exam  09/10/2021    Eye Exam  03/10/2022    COVID-19 Vaccine (3 - Booster for Pfizer series) 03/29/2022     Updates were requested from care everywhere.  Chart was reviewed for overdue Proactive [facility] Encounters (CAT) topics (CRS, Breast Cancer Screening, Eye exam)  Health Maintenance has been updated.  LINKS immunization registry triggered.  Immunizations were reconciled.

## 2022-03-17 NOTE — PT/OT/SLP PROGRESS
Occupational Therapy   Treatment    Name: Kamar Muñoz  MRN: [id number]  Admit Date: 3/10/2022  Admitting Diagnosis:  Encephalopathy, metabolic    General Precautions: Standard, fall   Orthopedic Precautions:N/A   Braces: N/A     Recommendations:     Discharge Recommendations: home health OT  Level of Assistance Recommended at Discharge: Intermittent assistance for ADL's and homemaking tasks  Discharge Equipment Recommendations:  bedside commode, walker, rolling, wheelchair (W/C with 18x16 seat, swing away desk length arm rests and swing away fot rests with heel loops.)  Barriers to discharge:  Decreased caregiver support    Assessment:     Kamar Muñoz is a 76 y.o. male with a medical diagnosis of Encephalopathy, metabolic.  Performance deficits affecting function are weakness, impaired endurance, impaired self care skills, gait instability, impaired balance, decreased safety awareness, pain, impaired cardiopulmonary response to activity. Patient tolerated treatment well and with good participation throughout session. Pt requires verbal cueing throughout tasks/mobility for safety aspects. Pt completed stand pivot transfers with CGA and no AD and with 1 noted LOB when performing stand pivot transfer from EOB<>W/C. Pt to continue OT POC to increase independence and safety needed for ADLs.     Rehab Potential is good    Activity tolerance:  Fair    Plan:     Patient to be seen 6 x/week to address the above listed problems via self-care/home management, therapeutic activities, therapeutic exercises    · Plan of Care Expires: 04/11/22  · Plan of Care Reviewed with: patient    Subjective     Communicated with: nurse prior to session.     Pain/Comfort:  Pain Rating 1: 7/10  Location - Side 1: Bilateral  Location - Orientation 1: posterior  Location 1: back  Pain Addressed 1: Distraction, Reposition, Pre-medicate for activity  Pain Rating Post-Intervention 1: 7/10    Patient's cultural, spiritual, Nondenominational  conflicts given the current situation:  no    Objective:     Patient found supine with  (no lines) upon OT entry to room.    Bed Mobility:    · Patient completed Rolling/Turning to Right with stand by assistance  · Patient completed Scooting/Bridging with stand by assistance  · Patient completed Supine to Sit with stand by assistance     Functional Mobility/Transfers:  · Patient completed Sit <> Stand Transfer from EOB with contact guard assistance  with  no assistive device   · Patient completed Sit<>stand transfer from commode with minimal assistance with grab bars   · Patient completed Bed <> Chair Transfer using Stand Pivot technique with contact guard assistance with no assistive device. LOB noted during transfer, as pt thought he was closer to the W/C than he was.   · Patient completed Toilet Transfer Stand Pivot technique with contact guard assistance with  grab bars    Activities of Daily Living:  · Grooming: modified independence for pt to complete grooming sitting sinkside  · Toileting: moderate assistance to thread feet into pant legs and pull pants over hips in standing    [facility] 6 Click ADL: 17    OT Exercises: UE Ergometer performed for 10 min at mod resistance to increase endurance and strength needed for ADLs.     Treatment & Education:  -Patient educated on POC, safety when performing self-care tasks, and safety when performing functional transfers and mobility  -White board updated  -Self-care tasks completed-- as noted above.      Patient left up in chair with call button in reachEducation:      GOALS:   Multidisciplinary Problems     Occupational Therapy Goals        Problem: Occupational Therapy Goal    Goal Priority Disciplines Outcome Interventions   Occupational Therapy Goal     OT, PT/OT Ongoing, Progressing    Description: Goals to be met by: 3/31/22     Patient will increase functional independence with ADLs by performing:    UE Dressing with Modified [place].  LE Dressing with Set-up  Assistance.  Grooming while seated at sink with Modified [place]. Ongoing  Toileting from toilet or BSC with Supervision for hygiene and clothing management.   Bathing from sitting on shower seat with Supervision.  Supine to sit with Modified [place].  Stand pivot transfers with Modified [place] using A/D as needed..  Toilet transfer to toilet or BSC with Modified [place] using A/D as needed  Upper extremity exercise using UBE with mod resistance for 10 minutes to increased functional   endurance to perform functional tasks and mobility.     Ongoing  Caregiver will be educated on level of assist required to safely perform self care tasks and functional transfers..                     Time Tracking:     OT Date of Treatment: OT Date of Treatment: 03/17/22  OT Total Time (min): Total Time (min): 38 min    Billable Minutes:Self Care/Home Management 28  Therapeutic Exercise 10    3/17/2022

## 2022-03-17 NOTE — PT/OT/SLP PROGRESS
Physical Therapy Treatment    Patient Name:  Kamar Muñoz   MRN:  [id number]  Admit Date: 3/10/2022  Admitting Diagnosis: Encephalopathy, metabolic    General Precautions: Standard, fall (previous COVID pt.)   Orthopedic Precautions:N/A   Braces: N/A     Recommendations:     Discharge Recommendations:  home health PT   Level of Assistance Recommended at Discharge: Intermittent assistance   Discharge Equipment Recommendations: wheelchair, walker, rolling, bedside commode   Barriers to discharge: Decreased caregiver support (pt's kids work during the day)    Assessment:     Kamar Muñoz is a 76 y.o. male admitted with a medical diagnosis of Encephalopathy, metabolic. Pt tolerated therapy session well, with focus on gait training, cardiopulmonary endurance, standing balance and w/c mobility. Pt will continue to benefit from skilled PT services to improve overall functional mobility, strength and endurance.       Performance deficits affecting function:  weakness, impaired endurance, impaired functional mobilty, impaired balance, gait instability, decreased coordination, pain .    Rehab Potential is good    Activity Tolerance: Good    Plan:     Patient to be seen 6 x/week to address the above listed problems via gait training, therapeutic exercises, neuromuscular re-education, wheelchair management/training    · Plan of Care Expires: 04/10/22  · Plan of Care Reviewed with: patient    Subjective     Pt found supine in bed, agreeable to PT.     Pain/Comfort:  · Pain Rating 1: 7/10  · Location - Side 1: Bilateral  · Location - Orientation 1: posterior  · Pain Addressed 1: Distraction, Cessation of Activity  · Pain Rating Post-Intervention 1: 7/10    Patient's cultural, spiritual, Scientologist conflicts given the current situation:  · no    Objective:     Patient found supine with  (supine in bed) upon PT entry to room.     Therapeutic Activities and Exercises:    Functional Mobility:  · Bed Mobility:     · Supine to  Sit: modified independence with use of guard rail  · Transfers:     · Sit to Stand:  stand by assistance and contact guard assistance with rolling walker  · Bed to Chair: stand by assistance with  no AD  using  Stand Pivot  · Gait: x 176', RW at CGA/SBA. Focus on wide NICOLE and keeping AD close to pt's body  · Balance: standing dyn bal with unilateral UE support required on RW, SBA reaching and picking up object from floor with reacher. Don/doff pants standing EOB with UE support on RW, CGA/SBA.  · Wheelchair Propulsion:  Pt propelled Standard wheelchair x 155 feet on Level tile with  Bilateral upper extremity and Bilateral lower extremity with Supervision or Set-up Assistance.     AM-PAC 6 CLICK MOBILITY  18    Patient left sitting edge of bed with call button in reach.    GOALS:   Multidisciplinary Problems     Physical Therapy Goals        Problem: Physical Therapy Goal    Goal Priority Disciplines Outcome Goal Variances Interventions   Physical Therapy Goal     PT, PT/OT Ongoing, Progressing     Description: Goals to be met by: 3/25/22    Patient will increase functional independence with mobility by performing:    . Supine to sit with [place]  . Sit to supine with [place]  . Rolling to Left and Right with [place].  . Sit to stand transfer with Supervision  . Bed to chair transfer with Supervision using No Assistive Device  . Gait  x 150 feet with Supervision using Rolling Walker.   . Wheelchair propulsion x150 feet with Supervision using bilateral uppper extremities  . Ascend/descend 4 stair with bilateral Handrails Contact Guard Assistance using No Assistive Device.   . Ascend/Descend 4 inch curb step with Contact Guard Assistance using Rolling Walker.  . Retrieve object off floor in standing with RW and reacher and SBA.                     Time Tracking:     PT Received On: 03/17/22  PT Total Time (min):       Billable Minutes: Gait Training 30 and Therapeutic Activity 15    Treatment Type:  Treatment  PT/PTA: PTA     PTA Visit Number: 5 03/17/2022

## 2022-03-18 LAB
POCT GLUCOSE: 111 MG/DL (ref 70–110)
POCT GLUCOSE: 151 MG/DL (ref 70–110)
POCT GLUCOSE: 154 MG/DL (ref 70–110)
POCT GLUCOSE: 254 MG/DL (ref 70–110)

## 2022-03-18 PROCEDURE — 11000004 HC SNF PRIVATE

## 2022-03-18 PROCEDURE — 99900035 HC TECH TIME PER 15 MIN (STAT)

## 2022-03-18 PROCEDURE — 97530 THERAPEUTIC ACTIVITIES: CPT

## 2022-03-18 PROCEDURE — 94640 AIRWAY INHALATION TREATMENT: CPT

## 2022-03-18 PROCEDURE — 97110 THERAPEUTIC EXERCISES: CPT

## 2022-03-18 PROCEDURE — 27000221 HC OXYGEN, UP TO 24 HOURS

## 2022-03-18 PROCEDURE — 25000003 PHARM REV CODE 250: Performed by: HOSPITALIST

## 2022-03-18 PROCEDURE — 25000003 PHARM REV CODE 250: Performed by: NURSE PRACTITIONER

## 2022-03-18 PROCEDURE — 25000242 PHARM REV CODE 250 ALT 637 W/ HCPCS: Performed by: NURSE PRACTITIONER

## 2022-03-18 PROCEDURE — 94761 N-INVAS EAR/PLS OXIMETRY MLT: CPT

## 2022-03-18 RX ORDER — INSULIN ASPART 100 [IU]/ML
5 INJECTION, SOLUTION INTRAVENOUS; SUBCUTANEOUS
Status: DISCONTINUED | OUTPATIENT
Start: 2022-03-18 | End: 2022-03-20

## 2022-03-18 RX ADMIN — METFORMIN HYDROCHLORIDE 500 MG: 500 TABLET ORAL at 05:03

## 2022-03-18 RX ADMIN — APIXABAN 5 MG: 2.5 TABLET, FILM COATED ORAL at 08:03

## 2022-03-18 RX ADMIN — GABAPENTIN 200 MG: 100 CAPSULE ORAL at 03:03

## 2022-03-18 RX ADMIN — CLOPIDOGREL 75 MG: 75 TABLET, FILM COATED ORAL at 08:03

## 2022-03-18 RX ADMIN — SUCRALFATE 1 G: 1 TABLET ORAL at 08:03

## 2022-03-18 RX ADMIN — CETIRIZINE HYDROCHLORIDE 10 MG: 5 TABLET ORAL at 08:03

## 2022-03-18 RX ADMIN — Medication 400 MG: at 08:03

## 2022-03-18 RX ADMIN — GABAPENTIN 200 MG: 100 CAPSULE ORAL at 08:03

## 2022-03-18 RX ADMIN — LACOSAMIDE 100 MG: 50 TABLET, FILM COATED ORAL at 08:03

## 2022-03-18 RX ADMIN — AMIODARONE HYDROCHLORIDE 200 MG: 200 TABLET ORAL at 08:03

## 2022-03-18 RX ADMIN — INSULIN ASPART 9 UNITS: 100 INJECTION, SOLUTION INTRAVENOUS; SUBCUTANEOUS at 12:03

## 2022-03-18 RX ADMIN — DICLOFENAC SODIUM 4 G: 10 GEL TOPICAL at 03:03

## 2022-03-18 RX ADMIN — PANTOPRAZOLE SODIUM 40 MG: 40 TABLET, DELAYED RELEASE ORAL at 08:03

## 2022-03-18 RX ADMIN — IPRATROPIUM BROMIDE AND ALBUTEROL SULFATE 3 ML: 2.5; .5 SOLUTION RESPIRATORY (INHALATION) at 12:03

## 2022-03-18 RX ADMIN — INSULIN ASPART 1 UNITS: 100 INJECTION, SOLUTION INTRAVENOUS; SUBCUTANEOUS at 08:03

## 2022-03-18 RX ADMIN — SUCRALFATE 1 G: 1 TABLET ORAL at 05:03

## 2022-03-18 RX ADMIN — ASPIRIN 81 MG: 81 TABLET, COATED ORAL at 08:03

## 2022-03-18 RX ADMIN — SUCRALFATE 1 G: 1 TABLET ORAL at 12:03

## 2022-03-18 RX ADMIN — ATORVASTATIN CALCIUM 40 MG: 20 TABLET, FILM COATED ORAL at 08:03

## 2022-03-18 RX ADMIN — OXYCODONE 5 MG: 5 TABLET ORAL at 08:03

## 2022-03-18 RX ADMIN — Medication 500 MG: at 08:03

## 2022-03-18 RX ADMIN — DICLOFENAC SODIUM 4 G: 10 GEL TOPICAL at 08:03

## 2022-03-18 RX ADMIN — ERGOCALCIFEROL 50000 UNITS: 1.25 CAPSULE ORAL at 08:03

## 2022-03-18 RX ADMIN — INSULIN ASPART 5 UNITS: 100 INJECTION, SOLUTION INTRAVENOUS; SUBCUTANEOUS at 05:03

## 2022-03-18 RX ADMIN — SENNOSIDES AND DOCUSATE SODIUM 1 TABLET: 50; 8.6 TABLET ORAL at 08:03

## 2022-03-18 RX ADMIN — INSULIN ASPART 9 UNITS: 100 INJECTION, SOLUTION INTRAVENOUS; SUBCUTANEOUS at 08:03

## 2022-03-18 RX ADMIN — THERA TABS 1 TABLET: TAB at 08:03

## 2022-03-18 RX ADMIN — METFORMIN HYDROCHLORIDE 500 MG: 500 TABLET ORAL at 08:03

## 2022-03-18 NOTE — PT/OT/SLP PROGRESS
"Physical Therapy Treatment/6th visit    Patient Name:  Kamar Muñoz   MRN:  [id number]  Admit Date: 3/10/2022  Admitting Diagnosis: Encephalopathy, metabolic  General Precautions: Standard, fall   Orthopedic Precautions:N/A   Braces: N/A     Recommendations:     Discharge Recommendations:  home health PT   Level of Assistance Recommended at Discharge: Intermittent assistance   Discharge Equipment Recommendations: bedside commode, walker, rolling, wheelchair   Barriers to discharge: Decreased caregiver support    Assessment:     Kamar Muñoz is a 76 y.o. male admitted with a medical diagnosis of Encephalopathy, metabolic . Pt is unsafe with functional mobility at this time due to pt requires minimal assist for bed mobility, minimal/moderate assist for transfers, and max assist for sitting balance due to L sided and anterior instability. Pt is motivated to progress with functional mobility.    Performance deficits affecting function:  weakness, gait instability, impaired balance, impaired functional mobilty, decreased safety awareness, decreased coordination .    Rehab Potential is good    Activity Tolerance: Fair pt c/o weakness and "not feeling right" , nurse notified and present to assess pt    Plan:     Patient to be seen 6 x/week to address the above listed problems via gait training, therapeutic activities, therapeutic exercises, neuromuscular re-education, wheelchair management/training    · Plan of Care Expires: 04/10/22  · Plan of Care Reviewed with: patient    Subjective   "I can't do anymore, I don't feel right"     Pain/Comfort:  · Pain Rating 1: 0/10 (pt stated "I can't sit up too long or my back will start hurting")  · Pain Rating Post-Intervention 1: 0/10    Patient's cultural, spiritual, Sikh conflicts given the current situation:  · no    Objective:     Communicated with nurse prior to session.  Patient found sitting EOB with oxygen upon PT entry to room. PT returned in pm to work with pt " "and pt refused stating he still didn't feel well. /66, nurse notified.    Therapeutic Activities and Exercises: Pt was sitting on the EOB. Upon PT entering pt's room, pt's L UE slipped off the edge of the bed and pt had LOB to the L and anterior. Pt's was eating his breakfast with his L UE. Pt was assisted back to midline with max assist for ~ 6 min prior to transfer and pt was unable to maintain midline without assist. Nurse called to room and pt's BP was 95/62 and ox sat 96% on 1 LPM oxygen. Pt was transferred to the w/c as below and a pillow was placed on his L side of his trunk to maintain midline position. Pt continued to eat his breakfast and after ~ 5 min , pt was noted to be sitting upright and able to maintain midline and pillow was removed. Pt refused further treatment due to "not feeling well" and requested to return to supine. Pt was assisted back to bed as below.     Functional Mobility:  · Bed Mobility:     · Sit to Supine: minimum assistance  · Transfers:     · Sit to Stand:  minimum assistance with hand-held assist  · Bed to/from Chair: moderate assistance with  hand-held assist  using  Stand Pivot  · Gait: 4 steps to/from w/c with HHA with moderate assist. pt with difficulty clearing his feet to step when transferring to the w/c, improved clearance and coordination when stepping back to the bed.    AM-PAC 6 CLICK MOBILITY  15    Patient left HOB elevated with all lines intact, call button in reach, nurse notified and nurse present.    GOALS:   Multidisciplinary Problems     Physical Therapy Goals        Problem: Physical Therapy Goal    Goal Priority Disciplines Outcome Goal Variances Interventions   Physical Therapy Goal     PT, PT/OT Ongoing, Progressing     Description: Goals to be met by: 3/25/22    Patient will increase functional independence with mobility by performing:    . Supine to sit with [place]  . Sit to supine with [place]  . Rolling to Left and Right with " [place].  . Sit to stand transfer with Supervision  . Bed to chair transfer with Supervision using No Assistive Device  . Gait  x 150 feet with Supervision using Rolling Walker.   . Wheelchair propulsion x150 feet with Supervision using bilateral uppper extremities  . Ascend/descend 4 stair with bilateral Handrails Contact Guard Assistance using No Assistive Device.   . Ascend/Descend 4 inch curb step with Contact Guard Assistance using Rolling Walker.  . Retrieve object off floor in standing with RW and reacher and SBA.                     Time Tracking:     PT Received On: 03/18/22  PT Total Time (min):   24    Billable Minutes: Therapeutic Activity 24    Treatment Type: Treatment  PT/PTA: PT     PTA Visit Number: 0     03/18/2022

## 2022-03-18 NOTE — PROGRESS NOTES
PT 6th visit note    PT/PTA met face to face to discuss patient's treatment plan and progress towards established goals.  Pt's goals assessed.  Patient will be seen by physical therapist every sixth visit and minimally once per month.    Naomi Landaverde PT 3/18/22

## 2022-03-18 NOTE — PT/OT/SLP PROGRESS
"Occupational Therapy   Treatment    Name: Kamar Muñoz  MRN: [id number]  Admit Date: 3/10/2022  Admitting Diagnosis:  Encephalopathy, metabolic    General Precautions: Standard, fall   Orthopedic Precautions:N/A   Braces: N/A     Recommendations:     Discharge Recommendations: home health OT  Level of Assistance Recommended at Discharge: Intermittent assistance for ADL's and homemaking tasks  Discharge Equipment Recommendations:  bedside commode, walker, rolling, wheelchair (W/C with 18x16 seat, swing away desk length arm rests and swing away fot rests with heel loops.)  Barriers to discharge:  Decreased caregiver support    Assessment:     Kamar Muñoz is a 76 y.o. male with a medical diagnosis of Encephalopathy, metabolic.  He presents with performance deficits affecting function are weakness, impaired endurance, impaired self care skills, impaired functional mobilty, gait instability, impaired balance, decreased lower extremity function, decreased safety awareness, decreased upper extremity function, pain, impaired cardiopulmonary response to activity.   Pt tolerated session fair on this day due to c/o weakness. Pt refusing OOB/self care activities due to fear of falling and weakness. NSG dicussed with therapist pt having episode of low BP and LOB during day. Vitals monitored: BP: 119/70, HR: 86 bpm, O2: 96%. Pt completing EOB exercises with rest breaks to complete. Pt would benefit from continued skilled OT to address functional deficits for improved safety and performance in self care and other functional tasks.     Rehab Potential is fair    Activity tolerance:  Fair    Plan:     Patient to be seen 6 x/week to address the above listed problems via self-care/home management, therapeutic activities, therapeutic exercises    · Plan of Care Expires: 04/11/22  · Plan of Care Reviewed with: patient    Subjective   "I had a bad night last night and couldn't breathe and now I have been feeling weak all day." "   Communicated with: NSG prior to session.     Pain/Comfort:  · Pain Rating 1: 7/10  · Location - Side 1: Bilateral  · Location - Orientation 1: generalized  · Location 1: back  · Pain Addressed 1: Distraction, Reposition  · Pain Rating Post-Intervention 1: 7/10    Patient's cultural, spiritual, Christian conflicts given the current situation:  · no    Objective:     Patient found HOB elevated with  (no lines) upon OT entry to room.    Bed Mobility:    · Patient completed Rolling/Turning to Right with stand by assistance  · Patient completed Scooting/Bridging with stand by assistance  · Patient completed Supine to Sit with stand by assistance  · Patient completed Sit to Supine with stand by assistance     [facility] 6 Click ADL: 17    OT Exercises: Exercises completed unsupported at EOB with close SBA for improved BUE/BLE strength required for ADLs/IADLs.. BUE: 2 x 10 reps w/ 2 lb dowel: shoulder flexion, bicep curls, forward chest press, overhead chest press, horizontal ab/adduction. BLE: 2 x 10 reps against gravity: knee extension and marching.      Treatment & Education:  Pt educated on:   - POC/OT role   - benefit of performing OOB activity  Pt receptive to education providing verbal understanding on this day.     Patient left HOB elevated with call button in reach and NSG notified of pt vital signs    GOALS:   Multidisciplinary Problems     Occupational Therapy Goals        Problem: Occupational Therapy Goal    Goal Priority Disciplines Outcome Interventions   Occupational Therapy Goal     OT, PT/OT Ongoing, Progressing    Description: Goals to be met by: 3/31/22     Patient will increase functional independence with ADLs by performing:    UE Dressing with Modified [place].  LE Dressing with Set-up Assistance.  Grooming while seated at sink with Modified [place]. Ongoing  Toileting from toilet or BSC with Supervision for hygiene and clothing management.   Bathing from sitting on shower seat with  Supervision.  Supine to sit with Modified [place].  Stand pivot transfers with Modified [place] using A/D as needed..  Toilet transfer to toilet or BSC with Modified [place] using A/D as needed  Upper extremity exercise using UBE with mod resistance for 10 minutes to increased functional   endurance to perform functional tasks and mobility.     Ongoing  Caregiver will be educated on level of assist required to safely perform self care tasks and functional transfers..                     Time Tracking:     OT Date of Treatment: OT Date of Treatment: 03/18/22  OT Total Time (min): Total Time (min): 15 min    Billable Minutes:Therapeutic Exercise 15    3/18/2022

## 2022-03-18 NOTE — PLAN OF CARE
Problem: Physical Therapy Goal  Goal: Physical Therapy Goal  Description: Goals to be met by: 3/25/22    Patient will increase functional independence with mobility by performing:    . Supine to sit with [place]  . Sit to supine with [place]  . Rolling to Left and Right with [place].  . Sit to stand transfer with Supervision  . Bed to chair transfer with Supervision using No Assistive Device  . Gait  x 150 feet with Supervision using Rolling Walker.   . Wheelchair propulsion x150 feet with Supervision using bilateral uppper extremities  . Ascend/descend 4 stair with bilateral Handrails Contact Guard Assistance using No Assistive Device.   . Ascend/Descend 4 inch curb step with Contact Guard Assistance using Rolling Walker.  . Retrieve object off floor in standing with RW and reacher and SBA.    Outcome: Ongoing, Progressing   Pt's goals remain appropriate and pt will continue to benefit from skilled PT services to work towards improved functional mobility including: bed mobility, transfers, up/down steps, w/c mobility, and gait.   3/18/2022

## 2022-03-18 NOTE — PROGRESS NOTES
[facility] - Skilled Nursing  Adult Nutrition  Progress Note    SUMMARY   Recommendations  Continue 2000 diabetic, boost glucose TID,Patric BID,  Goals: PO to meet 85% of EEN/EPN with ONS by next RD follow up  Nutrition Goal Status: goal met      Assessment and Plan  Nutrition Problem:  Severe Protein-Calorie Malnutrition  Malnutrition in the context of Acute Illness/Injury     Related to (etiology):  pysiological demands in the presence of poor intake     Signs and Symptoms (as evidenced by):  Energy Intake: <75% of estimated energy requirement for > 1 month  Body Fat Depletion: mild depletion of orbitals ,moderate to tricep  Muscle Mass Depletion: mild and moderate depletion of temples, clavicle region, scapular region, interosseous muscle and lower extremities   Weight Loss: 20% x 1-2 months from 1/26 209# to 168# on 3/10         Interventions(treatment strategy):  Commercial beverage(amino acids) Patric BID  Commercial beverage( protein and calories) Boost glucose TID chocolate  Collaboration with other providers  Carbohydrate modified diet- 2000 calories   Multivitamin/mineral therapy- Vit D, MVI, Vit C,  Nutrition Education - diabetic diet -3/11       Malnutrition Assessment     Skin (Micronutrient): wounds unhealed, dry, scaly  Teeth (Micronutrient): broken dentition  Neck/Chest (Micronutrient): muscle wasting, subcutaneous fat loss  Musculoskeletal/Lower Extremities: muscle wasting, subcutaneous fat loss       Weight Loss (Malnutrition): greater than 5% in 1 month  Energy Intake (Malnutrition): less than or equal to 50% for greater than or equal to 1 month   Orbital Region (Subcutaneous Fat Loss): moderate depletion  Upper Arm Region (Subcutaneous Fat Loss): severe depletion  Thoracic and Lumbar Region: moderate depletion   [place]ventist Region (Muscle Loss): mild depletion  Clavicle Bone Region (Muscle Loss): moderate depletion  Clavicle and Acromion Bone Region (Muscle Loss): moderate depletion  Scapular Bone  "Region (Muscle Loss): moderate depletion  Dorsal Hand (Muscle Loss): moderate depletion  Patellar Region (Muscle Loss): moderate depletion  Anterior Thigh Region (Muscle Loss): moderate depletion  Posterior Calf Region (Muscle Loss): mild depletion                 Reason for Assessment    Reason For Assessment: RD follow-up  Diagnosis: infection/sepsis (s/p BRENDAN, DKA,AMS)  Relevant Medical History: COVID, pneumonia,CVA 1/22, AFIB, DM, HTN, HLD, COPD, CAD, seizure, pulmonary masses, R/O malignancy  Interdisciplinary Rounds: did not attend  General Information Comments: PO %, taking oral supplements, wounds improving, pt is still uncomfortable, diabetes education has been initiated and ongoing  Nutrition Discharge Planning: DC on diabetic diet, diabetic ONS    Nutrition Risk Screen    Nutrition Risk Screen: large or nonhealing wound, burn or pressure injury    Nutrition/Diet History    Patient Reported Diet/Restrictions/Preferences: diabetic diet  Spiritual, Cultural Beliefs, Rastafarian Practices, Values that Affect Care: no  Food Allergies: NKFA  Factors Affecting Nutritional Intake: decreased appetite    Anthropometrics    Temp: 96.6 °F (35.9 °C)  Height: 6' 2" (188 cm)  Height (inches): 74 in  Weight Method: Bed Scale  Weight: 77.4 kg (170 lb 10.2 oz)  Weight (lb): 170.64 lb  Ideal Body Weight (IBW), Male: 190 lb  % Ideal Body Weight, Male (lb): 89.35 %  BMI (Calculated): 21.9  BMI Grade: 18.5-24.9 - normal       Lab/Procedures/Meds    Pertinent Labs Reviewed: reviewed  Pertinent Labs Comments: Glucose 131, Hg 11.8, Hct 36.6  Pertinent Medications Reviewed: reviewed  Pertinent Medications Comments: metformin, insulin    Physical Findings/Assessment         Estimated/Assessed Needs    Weight Used For Calorie Calculations: 77 kg (169 lb 12.1 oz)  Energy Calorie Requirements (kcal): 1848  Energy Need Method: [place]-St Jeor (x 1.25(PAL))  Protein Requirements: 92g-116g  Weight Used For Protein Calculations: 77 kg " (169 lb 12.1 oz) (x 1.2-1.5 g/kg)  Fluid Requirements (mL): 1948 or per MD  Estimated Fluid Requirement Method: RDA Method  RDA Method (mL): 1848  CHO Requirement: 243g      Nutrition Prescription Ordered    Current Diet Order: 2000 diabetic,  Nutrition Order Comments: PO 50%  Oral Nutrition Supplement: Boost glucose TID, Patric BID    Evaluation of Received Nutrient/Fluid Intake    I/O: -705  Energy Calories Required: meeting needs  Protein Required: meeting needs  Fluid Required: meeting needs  Comments: LBM 3/16  Tolerance: tolerating  % Intake of Estimated Energy Needs: 75 - 100 %  % Meal Intake: 75 - 100 %    Nutrition Risk    Level of Risk/Frequency of Follow-up: low (one time per week)     Monitor and Evaluation    Food and Nutrient Intake: food and beverage intake  Food and Nutrient Adminstration: diet order  Knowledge/Beliefs/Attitudes: beliefs and attitudes  Anthropometric Measurements: weight  Biochemical Data, Medical Tests and Procedures: glucose/endocrine profile, electrolyte and renal panel, gastrointestinal profile, inflammatory profile  Nutrition-Focused Physical Findings: overall appearance     Nutrition Follow-Up    RD Follow-up?: Yes

## 2022-03-18 NOTE — PLAN OF CARE
Continue 2000 diabetic, boost glucose TID,Patric BID,  Goals: PO to meet 85% of EEN/EPN with ONS by next RD follow up  Nutrition Goal Status: goal met        Assessment and Plan  Nutrition Problem:  Severe Protein-Calorie Malnutrition  Malnutrition in the context of Acute Illness/Injury     Related to (etiology):  pysiological demands in the presence of poor intake     Signs and Symptoms (as evidenced by):  Energy Intake: <75% of estimated energy requirement for > 1 month  Body Fat Depletion: mild depletion of orbitals ,moderate to tricep  Muscle Mass Depletion: mild and moderate depletion of temples, clavicle region, scapular region, interosseous muscle and lower extremities   Weight Loss: 20% x 1-2 months from 1/26 209# to 168# on 3/10         Interventions(treatment strategy):  Commercial beverage(amino acids) Patric BID  Commercial beverage( protein and calories) Boost glucose TID chocolate  Collaboration with other providers  Carbohydrate modified diet- 2000 calories   Multivitamin/mineral therapy- Vit D, MVI, Vit C,  Nutrition Education - diabetic diet -3/11

## 2022-03-18 NOTE — PLAN OF CARE
Problem: Occupational Therapy Goal  Goal: Occupational Therapy Goal  Description: Goals to be met by: 3/31/22     Patient will increase functional independence with ADLs by performing:    UE Dressing with Modified [place].  LE Dressing with Set-up Assistance.  Grooming while seated at sink with Modified [place]. Ongoing  Toileting from toilet or BSC with Supervision for hygiene and clothing management.   Bathing from sitting on shower seat with Supervision.  Supine to sit with Modified [place].  Stand pivot transfers with Modified [place] using A/D as needed..  Toilet transfer to toilet or BSC with Modified [place] using A/D as needed  Upper extremity exercise using UBE with mod resistance for 10 minutes to increased functional   endurance to perform functional tasks and mobility.     Ongoing  Caregiver will be educated on level of assist required to safely perform self care tasks and functional transfers..    Outcome: Ongoing, Progressing

## 2022-03-19 LAB
POCT GLUCOSE: 151 MG/DL (ref 70–110)
POCT GLUCOSE: 253 MG/DL (ref 70–110)
POCT GLUCOSE: 356 MG/DL (ref 70–110)
POCT GLUCOSE: 380 MG/DL (ref 70–110)

## 2022-03-19 PROCEDURE — 25000003 PHARM REV CODE 250: Performed by: HOSPITALIST

## 2022-03-19 PROCEDURE — 25000003 PHARM REV CODE 250: Performed by: NURSE PRACTITIONER

## 2022-03-19 PROCEDURE — 94761 N-INVAS EAR/PLS OXIMETRY MLT: CPT

## 2022-03-19 PROCEDURE — 97110 THERAPEUTIC EXERCISES: CPT | Mod: CQ

## 2022-03-19 PROCEDURE — 11000004 HC SNF PRIVATE

## 2022-03-19 PROCEDURE — 97116 GAIT TRAINING THERAPY: CPT | Mod: CQ

## 2022-03-19 RX ADMIN — ATORVASTATIN CALCIUM 40 MG: 20 TABLET, FILM COATED ORAL at 10:03

## 2022-03-19 RX ADMIN — INSULIN ASPART 5 UNITS: 100 INJECTION, SOLUTION INTRAVENOUS; SUBCUTANEOUS at 10:03

## 2022-03-19 RX ADMIN — INSULIN ASPART 5 UNITS: 100 INJECTION, SOLUTION INTRAVENOUS; SUBCUTANEOUS at 05:03

## 2022-03-19 RX ADMIN — CALCIUM CARBONATE (ANTACID) CHEW TAB 500 MG 500 MG: 500 CHEW TAB at 08:03

## 2022-03-19 RX ADMIN — THERA TABS 1 TABLET: TAB at 10:03

## 2022-03-19 RX ADMIN — GABAPENTIN 200 MG: 100 CAPSULE ORAL at 08:03

## 2022-03-19 RX ADMIN — METFORMIN HYDROCHLORIDE 500 MG: 500 TABLET ORAL at 10:03

## 2022-03-19 RX ADMIN — SUCRALFATE 1 G: 1 TABLET ORAL at 10:03

## 2022-03-19 RX ADMIN — PANTOPRAZOLE SODIUM 40 MG: 40 TABLET, DELAYED RELEASE ORAL at 10:03

## 2022-03-19 RX ADMIN — Medication 400 MG: at 09:03

## 2022-03-19 RX ADMIN — ASPIRIN 81 MG: 81 TABLET, COATED ORAL at 10:03

## 2022-03-19 RX ADMIN — Medication 400 MG: at 10:03

## 2022-03-19 RX ADMIN — DICLOFENAC SODIUM 4 G: 10 GEL TOPICAL at 10:03

## 2022-03-19 RX ADMIN — METFORMIN HYDROCHLORIDE 500 MG: 500 TABLET ORAL at 05:03

## 2022-03-19 RX ADMIN — INSULIN ASPART 5 UNITS: 100 INJECTION, SOLUTION INTRAVENOUS; SUBCUTANEOUS at 12:03

## 2022-03-19 RX ADMIN — SUCRALFATE 1 G: 1 TABLET ORAL at 05:03

## 2022-03-19 RX ADMIN — APIXABAN 5 MG: 2.5 TABLET, FILM COATED ORAL at 08:03

## 2022-03-19 RX ADMIN — DICLOFENAC SODIUM 4 G: 10 GEL TOPICAL at 09:03

## 2022-03-19 RX ADMIN — Medication 500 MG: at 10:03

## 2022-03-19 RX ADMIN — SUCRALFATE 1 G: 1 TABLET ORAL at 12:03

## 2022-03-19 RX ADMIN — LOSARTAN POTASSIUM 25 MG: 25 TABLET, FILM COATED ORAL at 10:03

## 2022-03-19 RX ADMIN — INSULIN ASPART 5 UNITS: 100 INJECTION, SOLUTION INTRAVENOUS; SUBCUTANEOUS at 08:03

## 2022-03-19 RX ADMIN — AMIODARONE HYDROCHLORIDE 200 MG: 200 TABLET ORAL at 10:03

## 2022-03-19 RX ADMIN — CETIRIZINE HYDROCHLORIDE 10 MG: 5 TABLET ORAL at 08:03

## 2022-03-19 RX ADMIN — Medication 500 MG: at 08:03

## 2022-03-19 RX ADMIN — DICLOFENAC SODIUM 4 G: 10 GEL TOPICAL at 02:03

## 2022-03-19 RX ADMIN — LACOSAMIDE 100 MG: 50 TABLET, FILM COATED ORAL at 10:03

## 2022-03-19 RX ADMIN — SENNOSIDES AND DOCUSATE SODIUM 1 TABLET: 50; 8.6 TABLET ORAL at 08:03

## 2022-03-19 RX ADMIN — CLOPIDOGREL 75 MG: 75 TABLET, FILM COATED ORAL at 10:03

## 2022-03-19 RX ADMIN — GABAPENTIN 200 MG: 100 CAPSULE ORAL at 10:03

## 2022-03-19 RX ADMIN — GABAPENTIN 200 MG: 100 CAPSULE ORAL at 02:03

## 2022-03-19 RX ADMIN — APIXABAN 5 MG: 2.5 TABLET, FILM COATED ORAL at 10:03

## 2022-03-19 NOTE — PT/OT/SLP PROGRESS
Occupational Therapy      Patient Name:  Kamar Muñoz   MRN:  [id number]    Patient not seen today secondary to 1ST ATTEMPT wanted me to come after lunch.  2nd attempt polite decline to feeling sick with stomach pains. Will follow-up with OT POC.    3/19/2022

## 2022-03-19 NOTE — NURSING
Pt provided with rodrick crackers to eat for a bedtime snack. Safety measures maintained. Call light is within reach.

## 2022-03-19 NOTE — PLAN OF CARE
Problem: Adult Inpatient Plan of Care  Goal: Plan of Care Review  Outcome: Ongoing, Progressing  Flowsheets (Taken 3/19/2022 1631)  Plan of Care Reviewed With: patient     Problem: Diabetes Comorbidity  Goal: Blood Glucose Level Within Targeted Range  Outcome: Ongoing, Progressing     Problem: Adjustment to Illness (Sepsis/Septic Shock)  Goal: Optimal Coping  Outcome: Ongoing, Progressing     Problem: Bleeding (Sepsis/Septic Shock)  Goal: Absence of Bleeding  Outcome: Ongoing, Progressing     Problem: Glycemic Control Impaired (Sepsis/Septic Shock)  Goal: Blood Glucose Level Within Desired Range  Outcome: Ongoing, Progressing     Problem: Infection Progression (Sepsis/Septic Shock)  Goal: Absence of Infection Signs and Symptoms  Outcome: Ongoing, Progressing     Problem: Nutrition Impaired (Sepsis/Septic Shock)  Goal: Optimal Nutrition Intake  Outcome: Ongoing, Progressing     Problem: Fluid and Electrolyte Imbalance (Acute Kidney Injury/Impairment)  Goal: Fluid and Electrolyte Balance  Outcome: Ongoing, Progressing     Problem: Oral Intake Inadequate (Acute Kidney Injury/Impairment)  Goal: Optimal Nutrition Intake  Outcome: Ongoing, Progressing     Problem: Renal Function Impairment (Acute Kidney Injury/Impairment)  Goal: Effective Renal Function  Outcome: Ongoing, Progressing     Problem: Impaired Wound Healing  Goal: Optimal Wound Healing  Outcome: Ongoing, Progressing     Problem: Fall Injury Risk  Goal: Absence of Fall and Fall-Related Injury  Outcome: Ongoing, Progressing     Problem: Skin Injury Risk Increased  Goal: Skin Health and Integrity  Outcome: Ongoing, Progressing     Problem: Malnutrition  Goal: Improved Nutritional Intake  Outcome: Ongoing, Progressing

## 2022-03-19 NOTE — DISCHARGE SUMMARY
[facility] - Intensive Care ([address] Medicine  Discharge Summary      Patient Name: Kamar Muñoz  MRN: [id number]  Patient Class: IP- Inpatient  Admission Date: 2/19/2022  Hospital Length of Stay: 19 days  Discharge Date and Time: 3/10/2022  4:04 PM  Attending Physician: No att. providers found   Discharging Provider: Komal Huynh MD  Primary Care Provider: Basim Guerrero MD      HPI:   Kamar Muñoz is a 76 year old male with past medical history of CAD s/p 2 LAUREN in RCA (Jan 2022), HTN, HLD, p. A. 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 History was not obtained from patient due to encephalopathy. Patient's daughter at bedside reports the patient was recently in the ED on 2/17 for a mechanical fall after being discharged from rehab the same day. CTH and cervical spine revealed  evolving late subacute infarct involving the right frontal lobe with questionable petechial hemorrhage. Vascular neurology evaluated the patient and cleared him for discharge from without any new interventions. He was also tested positive for COVID-19 and was discharged yesterday from the ED. He subsequently received one dose of sotrovimab infusion for COVID and was in a normal state of health until this morning when his daughter found him lethargic and barely responsive prompting her to bring him to the ED. She reports the patient had no complaints prior to developing his AMS. Of note, the patient was recently admitted to [facility] from 01/25 through 02/13 for AMS concerning  for CVA, however he was treated for metabolic encephalopathy 2/2 to DKA/HHS, sepsis and BRENDAN.      Upon arrival to the ED, he was placed on 6L NC, normotensive and tachycardic. Lactic 11.5, WBC 17.8, Glucose 1109, pH 7.17, Co2 15.6 HCO3 <5, AG unable to calculate. sCr 3.1. CXR with intersitial opacities. He received ~4L of IVF, vancomycin, zosyn, initiated on insulin shifted for hyperkalemia and calcium for cardioprotection. ECG without noticeable ischemic changes. CTH without new changes- discussed findings with radiology. Patient was admitted to the MICU for further management.        * No surgery found *      Hospital Course:   ICU Course:  Placed on remdesivir and broad spectrum IV abx in addition to insulin per DKA protocol. In MICU: Weaned supp O2 to room air; Transitioned to subq insulin; Improvement of AMS. BRENDAN improving gradually. Pt had discussion w/ family in MICU and transitioned from full code to DNR/DNI.  Step down to  initiated 2/21.    Hospital Medicine Course:  Pt w/ episode of CP and hypoglycemia upon stepdown. Ischemic work-up largely unremarkable with trop down trending from admission. Detemir dosing adjusted from BID to qhs. He had additional hypoglycemic episode upon re-uptitration of long-acting insulin from 12 to 15. Dosing decreased to 13u as patient was hyperglycemic to 230s w/12u qhs. Worsening hyperglycemia to 270s- insulin dose modified to 3u TID wm, and detemir 14u qhs.    Episode of abdominal pain and diarrhea 02/25- appeared to be 2/2 antibiotic use. Antibiotics stopped 02/25. Pt w/persistent diarrhea- c diff studies sent- loperamide stopped.     Pt was found to have  sacral ulcer. Wound care consulted with recommendations for care.    Patient reportedly hypotensive on 3/5 and endorsing nausea, septic work up ordered but ultimately unrevealing. Patient's BP responded after 2.5L of fluid. Presume hypovolemia was the source of his hypotension  blood culture negative.      COVID  infection treated with remdesivir and oxygen with improvement.  Dexamethasone held due to hyperglycemia. Endocrine following for insulin regimen.    Patient with stable pulmonary nodules but unsafe for biopsy due to risk of pneumothorax.  Continue ongoing monitoring by PCP.         Goals of Care Treatment Preferences:  Code Status: DNR      Consults:   Consults (From admission, onward)        Status Ordering Provider     Inpatient virtual consult to Hospital Medicine  Once        Provider:  (Not yet assigned)    Completed YVONNE PHILLIPS     Inpatient consult to Endocrinology  Once        Provider:  (Not yet assigned)    Completed KATT ESPINOZA     Inpatient virtual consult to Hospital Medicine  Once        Provider:  (Not yet assigned)    Completed FRANCO BENITES     Inpatient virtual consult to Hospital Medicine  Once        Provider:  (Not yet assigned)    Completed NEFTALY REID     Inpatient consult to Physical Medicine Rehab  Once        Provider:  (Not yet assigned)    Completed MARIANELA WASHINGTON     Inpatient consult to Registered Dietitian/Nutritionist  Once        Provider:  (Not yet assigned)    Completed MARIANELA WASHINGTON     Inpatient consult to Registered Dietitian/Nutritionist  Once        Provider:  (Not yet assigned)    Completed MARIANELA WASHINGTON     Inpatient consult to Critical Care Medicine  Once        Provider:  (Not yet assigned)    Completed ALEX COHEN          * Hypotension  Patient's hypotension on 3/5 resolved after fluids. He had labs consistent with pre-renal azotemia causing a recurrent BRENDAN / renal insufficiency as well that responded after fluids. Hypovolemia perhaps secondary to glucosuric diuresis due to uncontrolled hyperglycemia.   - BCx x2 ordered, NGTD. CXR slightly improved from before when patient admitted for COVID. UA not collected but patient denying any dysuria.   - Discontinued empiric antibiotics.     Discharge planning issues  PT/OT recommend rehab.  Patient's wife will  be going to [facility] and family wish for them to stay together.  COVID isolation complete on 3/9.  Patient is eager to be transferred to [facility]    Pressure injury of buttock, unstageable  Seen by wound care with [facility] started  Continues to cause patient discomfort.    Severe sepsis  Upon admission:  Organ dysfunction indicated by Acute kidney injury, Encephalopathy  and Acute respiratory failure  Source- Unclear. CXR with diffuse interstitial opacities, however no consolidation noted. UA with pyuria, without elevated leuks. Blood cultures negative. Possibly due to sacral wound, although doesn't look grossly infected.   Started on broad spectrum abx at admission with vanc/Zosyn/doxy.     Palliative care status  Per MICU: patient wishes to be DNR/DNI and family agrees. Still want to continue full medical care     COVID-19 virus infection  Viral Pneumonia due to COVID-19  COVID vaccinated with booster.   Received 1 dose of sotrovimab infusion 02/18/2022  - Diagnostics: Trend LDH, CRP, Ferritin, D-dimer Q48hrs  - Monitoring:          - Telemetry & Continuous Pulse Oximetry  - Completed 5 days of remdesivir, had dexamethasone held initially due to DKA. Currently asymptomatic from COVID-19.   - Stable on room air    - Nutrition:           - Multivitamin PO daily          - Add Boost supplement          - Vitamin D 1000IU daily if deficient          - Ascorbic acid 500mg PO bid    - Supportive Care:          - acetaminophen 650mg PO Q6hr PRN fever/headache          - loperamide PRN viral diarrhea    End isolation on 3/9/2022    Diabetic ketoacidosis with coma associated with type 2 diabetes mellitus  RESOLVED  Patient with uncontrolled DM presenting with metabolic encephalopathy in the setting of DKA with coma. Suspect patient developed DKA in the setting of sepsis/ COVID-19   Glucose 1109, pH 7.17, Co2 15.6 HCO3 <5, AG unable to calculate  DKA protocol completed and DKA resolved in MICU and Pt stepped down on subq  insulin.  Hypoglycemia episode upon step-down, detemir adjusted from BID to qhs per home long acting insulin and due to episode of hypoglycemia   Continue aspart TIDWM  Diabetic diet  POCT BGx4  Cotinue to titrate short and long acting insulin needs as indicated to hospital goal 140-180  Endocrinology continues to adjust insulin doses.    Focal seizures  Continue home lancosamide     Encephalopathy, metabolic  In the setting of DKA and sepsis upon admission.  Admit CT Head without any concerning new findings- no new infarct as discussed with Radiology  Back to baseline.    BRENDAN (acute kidney injury)  sCr maxed at 3.1, baseline 1.1-1.3. Likely prerenal in the setting of DKA. Improved with treatment of DKA.   Had worsening renal function when hypotensive from hypovolemia 3/5 that also improved with fluids  - Avoid nephrotoxins, renally dose meds  - resolved    Paroxysmal atrial fibrillation  History of paroxysmal atrial fibrillation on home  Metoprolol XL 50 mg daily, diltiazem  mg daily and amiodarone 200 mg daily.  - Continue po apixaban  - Continue amiodarone  - Holding metoprolol due to hypotension/bradycardia    Embolic stroke involving right middle cerebral artery  Hx of CVA in Jan 2022. CT Head with evolving infarcts in right frontal and left cerebellar hemispheres.   Has had issues with memory per family since CVA   No concern for acute stroke this admit per discussion with Radiology.   - Continue home antiplatelets, statin and Eliquis    Coronary artery disease involving native coronary artery of native heart with unstable angina pectoris  Hx of CAD s/p placement of 2 LAUREN in RCA in 01/09/2022.   - Continue home ASA, Plavix and statin  - Chest pain 3/4 after breakfast; EKG nonischemic. PPI, Tums, carafate added    Pulmonary nodules  Has stable bilateral pulmonary nodules/masses with broad differential per outpatient pulmonology notes. No pathology report as none of the nodules appeared to be safe for  biopsy given high risk of PTX is high with many adjacent bulla. Plan working on promoting functional independence for the time being with possible addressing of nodules in the future, which is highly dependent upon his overall functionality.    Chronic pulmonary heart disease  Follows up with Pulmonary clinic in [place]. Last seen in December and was evaluated for pulmonary nodules. Deemed to have mild COPD per notes. Not on any maintenance therapy.  - Continue albuterol inhaler     Type 2 diabetes mellitus with hyperglycemia, with long-term current use of insulin  See DKA  Glucoses remain erratic; Endocrine consulted and managing  - Levemir 8u qhs; changed to 6 units on 3/7  - Aspart 4-8u ac/hs pending BG values  - Endocrinology adjusting insulin doses daily; expecting current doses on 3/9 to be effective for transfer to [facility]    Hypertension associated with diabetes  Holding home meds due to normotension/hypotension  - was on losartan 25mg, Toprol xl 50mg, diltiazem 120mg at home  - Will restart losartan 25mg first if hypertension remains.   - See pAF      Final Active Diagnoses:    Diagnosis Date Noted POA    PRINCIPAL PROBLEM:  Hypotension [I95.9] 03/05/2022 Yes    Discharge planning issues [Z02.9] 03/02/2022 Not Applicable    Pressure injury of buttock, unstageable [L89.300] 02/28/2022 Yes    Palliative care status [Z51.5] 02/20/2022 Not Applicable    Severe sepsis [A41.9, R65.20] 02/20/2022 Yes    COVID-19 virus infection [U07.1] 02/18/2022 Yes    Diabetic ketoacidosis with coma associated with type 2 diabetes mellitus [E11.11] 01/29/2022 Yes     Chronic    Focal seizures [R56.9] 01/26/2022 Yes     Chronic    Encephalopathy, metabolic [G93.41] 01/25/2022 Yes    Paroxysmal atrial fibrillation [I48.0] 01/12/2022 Yes     Chronic    BRENDAN (acute kidney injury) [N17.9] 01/12/2022 Yes    Embolic stroke involving right middle cerebral artery [I63.411] 01/12/2022 Yes     Chronic    Coronary artery disease  involving native coronary artery of native heart with unstable angina pectoris [I25.110] 01/09/2022 Yes     Chronic    Pulmonary nodules [R91.8]  Yes    Chronic pulmonary heart disease [I27.9]  Yes    Type 2 diabetes mellitus with hyperglycemia, with long-term current use of insulin [E11.65, Z79.4] 03/08/2013 Not Applicable     Chronic    Hypertension associated with diabetes [E11.59, I15.2] 07/20/2012 Yes     Chronic      Problems Resolved During this Admission:    Diagnosis Date Noted Date Resolved POA    Diabetic acidosis [E11.10]  03/05/2022 Yes    Acute hypoxemic respiratory failure [J96.01] 02/20/2022 03/05/2022 Yes       Discharged Condition: stable    Disposition: Skilled Nursing Facility    Follow Up:   Follow-up Information     Basim Guerrero MD Follow up in 1 week(s).    Specialty: Family Medicine  Why: Dr. Guerrero's  to call the patient with appointment date & time.  Contact information:  [address]  [place] [address]  [phone]             Isidra Kumar MD Follow up on 3/21/2022.    Specialty: Internal Medicine  Why: at 11:00 AM; previously scheduled hospital follow up appointment  Contact information:  [address]  [address]  [place] [address]  [phone]                       Patient Instructions:      Ambulatory referral/consult to Outpatient Case Management   Referral Priority: Routine Referral Type: Consultation   Referral Reason: Specialty Services Required   Number of Visits Requested: 1       Significant Diagnostic Studies: as above    Pending Diagnostic Studies:     Procedure Component Value Units Date/Time    Basic metabolic panel [[id number]] Collected: 02/20/22 1315    Order Status: Sent Lab Status: In process Updated: 02/20/22 1315    Specimen: Blood     Phosphorus [[id number]] Collected: 02/20/22 1315    Order Status: Sent Lab Status: In process Updated: 02/20/22 1315    Specimen: Blood     Urinalysis, Reflex to Urine Culture Urine, Clean  Catch [[id number]] Collected: 02/19/22 2217    Order Status: Sent Lab Status: In process Updated: 02/19/22 2218    Specimen: Urine          Medications:  Reconciled Home Medications:      Medication List      START taking these medications    albuterol 90 mcg/actuation inhaler  Commonly known as: PROVENTIL/VENTOLIN HFA  Inhale 2 puffs into the lungs every 4 (four) hours as needed for Wheezing (Pt states he will take it on his own. MDI placed by bedside.). Rescue     diclofenac sodium 1 % Gel  Commonly known as: VOLTAREN  Apply 4 g topically 3 (three) times daily. Apply to sacrun closed skin/buttocks     gabapentin 100 MG capsule  Commonly known as: NEURONTIN  Take 2 capsules (200 mg total) by mouth 3 (three) times daily.     LIDOcaine 5 %  Commonly known as: LIDODERM  Place 1 patch onto the skin once daily. Place patch to lower back. Leave on for 12 hours and remove for 12 hours.     loperamide 2 mg capsule  Commonly known as: IMODIUM  Take 1 capsule (2 mg total) by mouth 3 (three) times daily as needed for Diarrhea.     magnesium oxide 400 mg (241.3 mg magnesium) tablet  Commonly known as: MAG-OX  Take 1 tablet (400 mg total) by mouth 2 (two) times daily.     melatonin 3 mg tablet  Commonly known as: MELATIN  Take 2 tablets (6 mg total) by mouth nightly as needed for Insomnia.     oxyCODONE 5 MG immediate release tablet  Commonly known as: ROXICODONE  Take 1 tablet (5 mg total) by mouth every 6 (six) hours as needed for Pain.     sucralfate 1 gram tablet  Commonly known as: CARAFATE  Take 1 tablet (1 g total) by mouth 3 (three) times daily before meals.        CHANGE how you take these medications    * insulin aspart U-100 100 unit/mL (3 mL) Inpn pen  Commonly known as: NovoLOG  Inject 0-5 Units into the skin 4 (four) times daily with meals and nightly. **LOW CORRECTION DOSE**  Blood Glucose  mg/dL                                    151-200                0 unit                        201-250                1 units  "                     251-300                2 units                      301-350                3 units                     >350                     4 units                      Administer subcutaneously if needed at times designated by monitoring schedule.   DO NOT HOLD correction dose insulin for patients who are  NPO.  "HIGH ALERT MEDICATION" - Administer with meals or TF/TPN.  What changed:   · how much to take  · when to take this  · reasons to take this  · additional instructions     * insulin aspart U-100 100 unit/mL (3 mL) Inpn pen  Commonly known as: NovoLOG  Inject 6 Units into the skin 3 (three) times daily with meals.  What changed:   · how much to take  · when to take this     insulin glargine 100 unit/mL injection  Commonly known as: Lantus  Inject 6 Units into the skin every evening.  What changed: how much to take         * This list has 2 medication(s) that are the same as other medications prescribed for you. Read the directions carefully, and ask your doctor or other care provider to review them with you.            CONTINUE taking these medications    amiodarone 200 MG Tab  Commonly known as: PACERONE  Take 1 tablet (200 mg total) by mouth once daily.     aspirin 81 MG EC tablet  Commonly known as: ECOTRIN  Take 81 mg by mouth once daily.     atorvastatin 40 MG tablet  Commonly known as: LIPITOR  Take 1 tablet (40 mg total) by mouth once daily.     blood sugar diagnostic Strp  1 strip by Misc.(Non-Drug; Combo Route) route 2 (two) times a day.     clopidogreL 75 mg tablet  Commonly known as: PLAVIX  Take 1 tablet (75 mg total) by mouth once daily.     ELIQUIS 5 mg Tab  Generic drug: apixaban  Take 1 tablet (5 mg total) by mouth 2 (two) times daily.     ergocalciferol 50,000 unit Cap  Commonly known as: ERGOCALCIFEROL  Take 1 capsule (50,000 Units total) by mouth every 7 days.     lacosamide 100 mg Tab  Commonly known as: VIMPAT  Take 1 tablet (100 mg total) by mouth every 12 (twelve) hours.   " "  lancets Misc  Commonly known as: ONETOUCH ULTRASOFT LANCETS  1 lancet by Misc.(Non-Drug; Combo Route) route 2 (two) times a day.     losartan 25 MG tablet  Commonly known as: COZAAR  Take 1 tablet (25 mg total) by mouth once daily.     metFORMIN 500 MG tablet  Commonly known as: GLUCOPHAGE  Take 1 tablet (500 mg total) by mouth 2 (two) times daily with meals.     MULTIVITAMIN ORAL  Take 1 tablet by mouth once daily.     nitroGLYCERIN 0.4 MG SL tablet  Commonly known as: NITROSTAT  Place 1 tablet (0.4 mg total) under the tongue every 5 (five) minutes as needed for Chest pain.     pantoprazole 40 MG tablet  Commonly known as: PROTONIX  Take 1 tablet (40 mg total) by mouth once daily.     * pen needle, diabetic 30 gauge x 5/16" Ndle  Commonly known as: PEN NEEDLE  1 Units by Misc.(Non-Drug; Combo Route) route 3 (three) times daily.     * BD ULTRA-FINE SHORT PEN NEEDLE 31 gauge x 5/16" Ndle  Generic drug: pen needle, diabetic  3 (three) times daily.     polycarbophil 625 mg tablet  Commonly known as: FIBERCON  Take 1 tablet by mouth once daily.     pulse oximeter device  Commonly known as: pulse oximeter  by [facility] route 2 (two) times a day. Use twice daily at 8 AM and 3 PM and record the value in [facility] as directed.     senna-docusate 8.6-50 mg 8.6-50 mg per tablet  Commonly known as: PERICOLACE  Take 1 tablet by mouth once daily.         * This list has 2 medication(s) that are the same as other medications prescribed for you. Read the directions carefully, and ask your doctor or other care provider to review them with you.            STOP taking these medications    diltiaZEM 120 MG Cp24  Commonly known as: CARDIZEM CD     metoprolol succinate 50 MG 24 hr tablet  Commonly known as: TOPROL-XL     molnupiravir 200 mg capsule (EUA)            Indwelling Lines/Drains at time of discharge:   Lines/Drains/Airways     None                 Time spent on the discharge of patient: 42 minutes         The attending " portion of this evaluation, treatment, and documentation was performed per Komal Huynh MD via Telemedicine AudioVisual using the secure [employer] software platform with 2 way audio/video. The provider was located off-site and the patient is located in the hospital. The aforementioned video software was utilized to document the relevant history and physical exam    Komal Huynh MD  Department of Hospital Medicine  [facility] - Intensive Care ([address])

## 2022-03-19 NOTE — ASSESSMENT & PLAN NOTE
RESOLVED  Patient with uncontrolled DM presenting with metabolic encephalopathy in the setting of DKA with coma. Suspect patient developed DKA in the setting of sepsis/ COVID-19   Glucose 1109, pH 7.17, Co2 15.6 HCO3 <5, AG unable to calculate  DKA protocol completed and DKA resolved in MICU and Pt stepped down on subq insulin.  Hypoglycemia episode upon step-down, detemir adjusted from BID to qhs per home long acting insulin and due to episode of hypoglycemia   Continue aspart TIDWM  Diabetic diet  POCT BGx4  Cotinue to titrate short and long acting insulin needs as indicated to hospital goal 140-180  Endocrinology continues to adjust insulin doses.   How Severe Is Your Rash?: moderate Is This A New Presentation, Or A Follow-Up?: Rash Additional History: Dr. Obrien Prescribed Terbinafine on 9/13/18 but has not begun taking.

## 2022-03-19 NOTE — ASSESSMENT & PLAN NOTE
Follows up with Pulmonary clinic in [place]. Last seen in December and was evaluated for pulmonary nodules. Deemed to have mild COPD per notes. Not on any maintenance therapy.  - Continue albuterol inhaler

## 2022-03-19 NOTE — PT/OT/SLP PROGRESS
"Physical Therapy Treatment    Patient Name:  Kamar Muñoz   MRN:  [id number]  Admit Date: 3/10/2022  Admitting Diagnosis: Encephalopathy, metabolic    General Precautions: Standard, fall   Orthopedic Precautions:N/A   Braces: N/A     Recommendations:     Discharge Recommendations:  home health PT   Level of Assistance Recommended at Discharge: Intermittent assistance   Discharge Equipment Recommendations: bedside commode, walker, rolling, wheelchair   Barriers to discharge: Decreased caregiver support    Assessment:     Kamar Muñoz is a 76 y.o. male admitted with a medical diagnosis of Encephalopathy, metabolic. Pt tolerated therapy session well. Pt met two goals today, asc/desc 4" curb step with RW at CGA, and picking up object with reacher, SBA with RW. Therapy session also focused on gait training, transfer training and LE strengthening in order to assist pt with achieving highest level of independence upon d/c from PT. Continue with POC per PT.       Performance deficits affecting function:  weakness, gait instability, impaired balance, impaired functional mobilty, decreased safety awareness, decreased coordination .    Rehab Potential is good    Activity Tolerance: Good    Plan:     Patient to be seen 6 x/week to address the above listed problems via gait training, therapeutic activities, therapeutic exercises, neuromuscular re-education, wheelchair management/training    · Plan of Care Expires: 04/10/22  · Plan of Care Reviewed with: patient    Subjective     "Sure I will do therapy."      Pain/Comfort:  · Pain Rating 1: 7/10  · Location - Side 1: Bilateral  · Location - Orientation 1: generalized  · Location 1:  (low back, sacrum/coccyx)  · Pain Addressed 1: Distraction, Reposition  · Pain Rating Post-Intervention 1: 7/10    Patient's cultural, spiritual, Restorationist conflicts given the current situation:  · no    Objective:     Patient found supine with  (supine in bed) upon PT entry to room. " "    Therapeutic Activities and Exercises: mini elliptical x 10 mins, mod resistance to focus on endurance and LE strengthening.     Functional Mobility:  · Bed Mobility:     · Rolling Left:  modified independence  · Rolling Right: modified independence  · Supine to Sit: modified independence  · Sit to Supine: modified independence  · Transfers:     · Sit to Stand:  stand by assistance and contact guard assistance with rolling walker  · Bed to Chair: stand by assistance with  no AD  using  Step Transfer  · Gait: x 98 and 196', RW at SBA. Focus on widening NICOLE with vc's/visual cues to prevent risk of falling.   · Balance: standing at RW, SBA picking up objects with reacher from ground level. No LOB.  · Stairs:  Pt ascended/descended 4" curb step with Rolling Walker with no handrails with Contact Guard Assistance.     AM-PAC 6 CLICK MOBILITY  17    Patient left supine with call button in reach.    GOALS:   Multidisciplinary Problems     Physical Therapy Goals        Problem: Physical Therapy Goal    Goal Priority Disciplines Outcome Goal Variances Interventions   Physical Therapy Goal     PT, PT/OT Ongoing, Progressing     Description: Goals to be met by: 3/25/22    Patient will increase functional independence with mobility by performing:    . Supine to sit with [place]  . Sit to supine with [place]  . Rolling to Left and Right with [place].  . Sit to stand transfer with Supervision  . Bed to chair transfer with Supervision using No Assistive Device  . Gait  x 150 feet with Supervision using Rolling Walker.   . Wheelchair propulsion x150 feet with Supervision using bilateral uppper extremities  . Ascend/descend 4 stair with bilateral Handrails Contact Guard Assistance using No Assistive Device.   . Ascend/Descend 4 inch curb step with Contact Guard Assistance using Rolling Walker. - Met 03/19  . Retrieve object off floor in standing with RW and reacher and SBA.- Met 03/19                     Time " Tracking:     PT Received On: 03/19/22  PT Total Time (min):       Billable Minutes: Gait Training 20 and Therapeutic Exercise 14    Treatment Type: Treatment  PT/PTA: PTA     PTA Visit Number: 1     03/19/2022

## 2022-03-20 LAB
POCT GLUCOSE: 194 MG/DL (ref 70–110)
POCT GLUCOSE: 218 MG/DL (ref 70–110)
POCT GLUCOSE: 316 MG/DL (ref 70–110)
POCT GLUCOSE: 72 MG/DL (ref 70–110)
POCT GLUCOSE: 76 MG/DL (ref 70–110)
POCT GLUCOSE: 82 MG/DL (ref 70–110)

## 2022-03-20 PROCEDURE — 25000003 PHARM REV CODE 250: Performed by: HOSPITALIST

## 2022-03-20 PROCEDURE — 25000003 PHARM REV CODE 250: Performed by: NURSE PRACTITIONER

## 2022-03-20 PROCEDURE — 11000004 HC SNF PRIVATE

## 2022-03-20 RX ORDER — INSULIN ASPART 100 [IU]/ML
7 INJECTION, SOLUTION INTRAVENOUS; SUBCUTANEOUS
Status: DISCONTINUED | OUTPATIENT
Start: 2022-03-21 | End: 2022-03-24

## 2022-03-20 RX ADMIN — GABAPENTIN 200 MG: 100 CAPSULE ORAL at 08:03

## 2022-03-20 RX ADMIN — LACOSAMIDE 100 MG: 50 TABLET, FILM COATED ORAL at 08:03

## 2022-03-20 RX ADMIN — ASPIRIN 81 MG: 81 TABLET, COATED ORAL at 08:03

## 2022-03-20 RX ADMIN — DICLOFENAC SODIUM 4 G: 10 GEL TOPICAL at 08:03

## 2022-03-20 RX ADMIN — INSULIN ASPART 5 UNITS: 100 INJECTION, SOLUTION INTRAVENOUS; SUBCUTANEOUS at 05:03

## 2022-03-20 RX ADMIN — Medication 500 MG: at 08:03

## 2022-03-20 RX ADMIN — INSULIN ASPART 4 UNITS: 100 INJECTION, SOLUTION INTRAVENOUS; SUBCUTANEOUS at 12:03

## 2022-03-20 RX ADMIN — Medication 400 MG: at 09:03

## 2022-03-20 RX ADMIN — THERA TABS 1 TABLET: TAB at 08:03

## 2022-03-20 RX ADMIN — LIDOCAINE 1 PATCH: 50 PATCH CUTANEOUS at 12:03

## 2022-03-20 RX ADMIN — SUCRALFATE 1 G: 1 TABLET ORAL at 12:03

## 2022-03-20 RX ADMIN — INSULIN ASPART 2 UNITS: 100 INJECTION, SOLUTION INTRAVENOUS; SUBCUTANEOUS at 05:03

## 2022-03-20 RX ADMIN — APIXABAN 5 MG: 2.5 TABLET, FILM COATED ORAL at 08:03

## 2022-03-20 RX ADMIN — Medication 400 MG: at 08:03

## 2022-03-20 RX ADMIN — CLOPIDOGREL 75 MG: 75 TABLET, FILM COATED ORAL at 08:03

## 2022-03-20 RX ADMIN — OXYCODONE 5 MG: 5 TABLET ORAL at 08:03

## 2022-03-20 RX ADMIN — INSULIN ASPART 5 UNITS: 100 INJECTION, SOLUTION INTRAVENOUS; SUBCUTANEOUS at 08:03

## 2022-03-20 RX ADMIN — SUCRALFATE 1 G: 1 TABLET ORAL at 08:03

## 2022-03-20 RX ADMIN — ATORVASTATIN CALCIUM 40 MG: 20 TABLET, FILM COATED ORAL at 08:03

## 2022-03-20 RX ADMIN — PANTOPRAZOLE SODIUM 40 MG: 40 TABLET, DELAYED RELEASE ORAL at 08:03

## 2022-03-20 RX ADMIN — GABAPENTIN 200 MG: 100 CAPSULE ORAL at 04:03

## 2022-03-20 RX ADMIN — Medication 500 MG: at 09:03

## 2022-03-20 RX ADMIN — CETIRIZINE HYDROCHLORIDE 10 MG: 5 TABLET ORAL at 08:03

## 2022-03-20 RX ADMIN — AMIODARONE HYDROCHLORIDE 200 MG: 200 TABLET ORAL at 08:03

## 2022-03-20 RX ADMIN — INSULIN ASPART 5 UNITS: 100 INJECTION, SOLUTION INTRAVENOUS; SUBCUTANEOUS at 12:03

## 2022-03-20 RX ADMIN — METFORMIN HYDROCHLORIDE 500 MG: 500 TABLET ORAL at 08:03

## 2022-03-20 RX ADMIN — SENNOSIDES AND DOCUSATE SODIUM 1 TABLET: 50; 8.6 TABLET ORAL at 08:03

## 2022-03-20 RX ADMIN — SUCRALFATE 1 G: 1 TABLET ORAL at 05:03

## 2022-03-20 RX ADMIN — DICLOFENAC SODIUM 4 G: 10 GEL TOPICAL at 04:03

## 2022-03-20 RX ADMIN — METFORMIN HYDROCHLORIDE 500 MG: 500 TABLET ORAL at 05:03

## 2022-03-20 RX ADMIN — DICLOFENAC SODIUM 4 G: 10 GEL TOPICAL at 09:03

## 2022-03-20 NOTE — PLAN OF CARE
Problem: Adult Inpatient Plan of Care  Goal: Plan of Care Review  Outcome: Ongoing, Progressing  Goal: Patient-Specific Goal (Individualized)  Outcome: Ongoing, Progressing  Goal: Absence of Hospital-Acquired Illness or Injury  Outcome: Ongoing, Progressing  Goal: Optimal Comfort and Wellbeing  Outcome: Ongoing, Progressing     Problem: Diabetes Comorbidity  Goal: Blood Glucose Level Within Targeted Range  Outcome: Ongoing, Progressing     Problem: Adjustment to Illness (Sepsis/Septic Shock)  Goal: Optimal Coping  Outcome: Ongoing, Progressing     Problem: Bleeding (Sepsis/Septic Shock)  Goal: Absence of Bleeding  Outcome: Ongoing, Progressing     Problem: Infection Progression (Sepsis/Septic Shock)  Goal: Absence of Infection Signs and Symptoms  Outcome: Ongoing, Progressing     Problem: Nutrition Impaired (Sepsis/Septic Shock)  Goal: Optimal Nutrition Intake  Outcome: Ongoing, Progressing     Problem: Fluid and Electrolyte Imbalance (Acute Kidney Injury/Impairment)  Goal: Fluid and Electrolyte Balance  Outcome: Ongoing, Progressing     Problem: Oral Intake Inadequate (Acute Kidney Injury/Impairment)  Goal: Optimal Nutrition Intake  Outcome: Ongoing, Progressing     Problem: Renal Function Impairment (Acute Kidney Injury/Impairment)  Goal: Effective Renal Function  Outcome: Ongoing, Progressing     Problem: Impaired Wound Healing  Goal: Optimal Wound Healing  Outcome: Ongoing, Progressing     Problem: Fall Injury Risk  Goal: Absence of Fall and Fall-Related Injury  Outcome: Ongoing, Progressing     Problem: Skin Injury Risk Increased  Goal: Skin Health and Integrity  Outcome: Ongoing, Progressing     Problem: Malnutrition  Goal: Improved Nutritional Intake  Outcome: Ongoing, Progressing

## 2022-03-21 ENCOUNTER — LAB VISIT (OUTPATIENT)
Dept: LAB | Facility: OTHER | Age: 77
End: 2022-03-21
Payer: MEDICARE

## 2022-03-21 DIAGNOSIS — Z20.822 ENCOUNTER FOR LABORATORY TESTING FOR COVID-19 VIRUS: ICD-10-CM

## 2022-03-21 LAB
ANION GAP SERPL CALC-SCNC: 8 MMOL/L (ref 8–16)
BASOPHILS # BLD AUTO: 0.07 K/UL (ref 0–0.2)
BASOPHILS NFR BLD: 1 % (ref 0–1.9)
BUN SERPL-MCNC: 20 MG/DL (ref 8–23)
CALCIUM SERPL-MCNC: 8.8 MG/DL (ref 8.7–10.5)
CHLORIDE SERPL-SCNC: 100 MMOL/L (ref 95–110)
CO2 SERPL-SCNC: 29 MMOL/L (ref 23–29)
CREAT SERPL-MCNC: 1 MG/DL (ref 0.5–1.4)
DIFFERENTIAL METHOD: ABNORMAL
EOSINOPHIL # BLD AUTO: 0.6 K/UL (ref 0–0.5)
EOSINOPHIL NFR BLD: 8.3 % (ref 0–8)
ERYTHROCYTE [DISTWIDTH] IN BLOOD BY AUTOMATED COUNT: 17.6 % (ref 11.5–14.5)
EST. GFR  (AFRICAN AMERICAN): >60 ML/MIN/1.73 M^2
EST. GFR  (NON AFRICAN AMERICAN): >60 ML/MIN/1.73 M^2
GLUCOSE SERPL-MCNC: 263 MG/DL (ref 70–110)
HCT VFR BLD AUTO: 36.3 % (ref 40–54)
HGB BLD-MCNC: 11.4 G/DL (ref 14–18)
IMM GRANULOCYTES # BLD AUTO: 0.01 K/UL (ref 0–0.04)
IMM GRANULOCYTES NFR BLD AUTO: 0.1 % (ref 0–0.5)
LYMPHOCYTES # BLD AUTO: 1.8 K/UL (ref 1–4.8)
LYMPHOCYTES NFR BLD: 26.5 % (ref 18–48)
MAGNESIUM SERPL-MCNC: 1.5 MG/DL (ref 1.6–2.6)
MCH RBC QN AUTO: 28.7 PG (ref 27–31)
MCHC RBC AUTO-ENTMCNC: 31.4 G/DL (ref 32–36)
MCV RBC AUTO: 91 FL (ref 82–98)
MONOCYTES # BLD AUTO: 0.7 K/UL (ref 0.3–1)
MONOCYTES NFR BLD: 9.8 % (ref 4–15)
NEUTROPHILS # BLD AUTO: 3.7 K/UL (ref 1.8–7.7)
NEUTROPHILS NFR BLD: 54.3 % (ref 38–73)
NRBC BLD-RTO: 0 /100 WBC
PHOSPHATE SERPL-MCNC: 2.8 MG/DL (ref 2.7–4.5)
PLATELET # BLD AUTO: 252 K/UL (ref 150–450)
PMV BLD AUTO: 9.9 FL (ref 9.2–12.9)
POCT GLUCOSE: 159 MG/DL (ref 70–110)
POCT GLUCOSE: 263 MG/DL (ref 70–110)
POCT GLUCOSE: 300 MG/DL (ref 70–110)
POCT GLUCOSE: 384 MG/DL (ref 70–110)
POTASSIUM SERPL-SCNC: 4.6 MMOL/L (ref 3.5–5.1)
RBC # BLD AUTO: 3.97 M/UL (ref 4.6–6.2)
SODIUM SERPL-SCNC: 137 MMOL/L (ref 136–145)
WBC # BLD AUTO: 6.87 K/UL (ref 3.9–12.7)

## 2022-03-21 PROCEDURE — 11000004 HC SNF PRIVATE

## 2022-03-21 PROCEDURE — 83735 ASSAY OF MAGNESIUM: CPT | Performed by: HOSPITALIST

## 2022-03-21 PROCEDURE — 97530 THERAPEUTIC ACTIVITIES: CPT | Mod: CO

## 2022-03-21 PROCEDURE — U0003 INFECTIOUS AGENT DETECTION BY NUCLEIC ACID (DNA OR RNA); SEVERE ACUTE RESPIRATORY SYNDROME CORONAVIRUS 2 (SARS-COV-2) (CORONAVIRUS DISEASE [COVID-19]), AMPLIFIED PROBE TECHNIQUE, MAKING USE OF HIGH THROUGHPUT TECHNOLOGIES AS DESCRIBED BY CMS-2020-01-R: HCPCS | Performed by: EMERGENCY MEDICINE

## 2022-03-21 PROCEDURE — 85025 COMPLETE CBC W/AUTO DIFF WBC: CPT | Performed by: HOSPITALIST

## 2022-03-21 PROCEDURE — 25000003 PHARM REV CODE 250: Performed by: HOSPITALIST

## 2022-03-21 PROCEDURE — 25000003 PHARM REV CODE 250: Performed by: NURSE PRACTITIONER

## 2022-03-21 PROCEDURE — 97116 GAIT TRAINING THERAPY: CPT | Mod: CQ

## 2022-03-21 PROCEDURE — 36415 COLL VENOUS BLD VENIPUNCTURE: CPT | Performed by: HOSPITALIST

## 2022-03-21 PROCEDURE — 84100 ASSAY OF PHOSPHORUS: CPT | Performed by: HOSPITALIST

## 2022-03-21 PROCEDURE — 80048 BASIC METABOLIC PNL TOTAL CA: CPT | Performed by: HOSPITALIST

## 2022-03-21 PROCEDURE — 97110 THERAPEUTIC EXERCISES: CPT | Mod: CQ

## 2022-03-21 RX ADMIN — APIXABAN 5 MG: 2.5 TABLET, FILM COATED ORAL at 09:03

## 2022-03-21 RX ADMIN — Medication 400 MG: at 09:03

## 2022-03-21 RX ADMIN — GABAPENTIN 200 MG: 100 CAPSULE ORAL at 09:03

## 2022-03-21 RX ADMIN — Medication 400 MG: at 08:03

## 2022-03-21 RX ADMIN — INSULIN ASPART 5 UNITS: 100 INJECTION, SOLUTION INTRAVENOUS; SUBCUTANEOUS at 12:03

## 2022-03-21 RX ADMIN — ASPIRIN 81 MG: 81 TABLET, COATED ORAL at 09:03

## 2022-03-21 RX ADMIN — DICLOFENAC SODIUM 4 G: 10 GEL TOPICAL at 08:03

## 2022-03-21 RX ADMIN — METFORMIN HYDROCHLORIDE 500 MG: 500 TABLET ORAL at 05:03

## 2022-03-21 RX ADMIN — SENNOSIDES AND DOCUSATE SODIUM 1 TABLET: 50; 8.6 TABLET ORAL at 09:03

## 2022-03-21 RX ADMIN — INSULIN ASPART 3 UNITS: 100 INJECTION, SOLUTION INTRAVENOUS; SUBCUTANEOUS at 05:03

## 2022-03-21 RX ADMIN — PANTOPRAZOLE SODIUM 40 MG: 40 TABLET, DELAYED RELEASE ORAL at 09:03

## 2022-03-21 RX ADMIN — ATORVASTATIN CALCIUM 40 MG: 20 TABLET, FILM COATED ORAL at 09:03

## 2022-03-21 RX ADMIN — APIXABAN 5 MG: 2.5 TABLET, FILM COATED ORAL at 08:03

## 2022-03-21 RX ADMIN — INSULIN ASPART 7 UNITS: 100 INJECTION, SOLUTION INTRAVENOUS; SUBCUTANEOUS at 09:03

## 2022-03-21 RX ADMIN — CETIRIZINE HYDROCHLORIDE 10 MG: 5 TABLET ORAL at 08:03

## 2022-03-21 RX ADMIN — DICLOFENAC SODIUM 4 G: 10 GEL TOPICAL at 09:03

## 2022-03-21 RX ADMIN — INSULIN ASPART 3 UNITS: 100 INJECTION, SOLUTION INTRAVENOUS; SUBCUTANEOUS at 09:03

## 2022-03-21 RX ADMIN — THERA TABS 1 TABLET: TAB at 09:03

## 2022-03-21 RX ADMIN — GABAPENTIN 200 MG: 100 CAPSULE ORAL at 02:03

## 2022-03-21 RX ADMIN — SUCRALFATE 1 G: 1 TABLET ORAL at 09:03

## 2022-03-21 RX ADMIN — OXYCODONE 5 MG: 5 TABLET ORAL at 08:03

## 2022-03-21 RX ADMIN — INSULIN ASPART 7 UNITS: 100 INJECTION, SOLUTION INTRAVENOUS; SUBCUTANEOUS at 12:03

## 2022-03-21 RX ADMIN — LIDOCAINE 1 PATCH: 50 PATCH CUTANEOUS at 01:03

## 2022-03-21 RX ADMIN — SUCRALFATE 1 G: 1 TABLET ORAL at 05:03

## 2022-03-21 RX ADMIN — DICLOFENAC SODIUM 4 G: 10 GEL TOPICAL at 02:03

## 2022-03-21 RX ADMIN — AMIODARONE HYDROCHLORIDE 200 MG: 200 TABLET ORAL at 09:03

## 2022-03-21 RX ADMIN — Medication 500 MG: at 08:03

## 2022-03-21 RX ADMIN — GABAPENTIN 200 MG: 100 CAPSULE ORAL at 08:03

## 2022-03-21 RX ADMIN — LOSARTAN POTASSIUM 25 MG: 25 TABLET, FILM COATED ORAL at 09:03

## 2022-03-21 RX ADMIN — CLOPIDOGREL 75 MG: 75 TABLET, FILM COATED ORAL at 09:03

## 2022-03-21 RX ADMIN — SUCRALFATE 1 G: 1 TABLET ORAL at 12:03

## 2022-03-21 RX ADMIN — LACOSAMIDE 100 MG: 50 TABLET, FILM COATED ORAL at 08:03

## 2022-03-21 RX ADMIN — INSULIN ASPART 7 UNITS: 100 INJECTION, SOLUTION INTRAVENOUS; SUBCUTANEOUS at 05:03

## 2022-03-21 RX ADMIN — LACOSAMIDE 100 MG: 50 TABLET, FILM COATED ORAL at 09:03

## 2022-03-21 RX ADMIN — METFORMIN HYDROCHLORIDE 500 MG: 500 TABLET ORAL at 09:03

## 2022-03-21 RX ADMIN — Medication 500 MG: at 09:03

## 2022-03-21 NOTE — PLAN OF CARE
Interdisciplinary team, Cindy Melissa, RN Nurse Manager, Jenny Obando , RN Charge Nurse, Zana Blankenship, Unit Director, Deepti Soto, RN MDS Coordinator, Salina Sethi [facility] [place], Salina Yates, Dietician, and Cody Plummer OT, spoke to patient for care plan conference, weekly status update, and therapy progress update. Tenative discharge date set for 3/25/22.

## 2022-03-21 NOTE — PT/OT/SLP PROGRESS
Occupational Therapy   Treatment    Name: Kamar Muñoz  MRN: [id number]  Admit Date: 3/10/2022  Admitting Diagnosis:  Encephalopathy, metabolic    General Precautions: Standard, fall   Orthopedic Precautions:N/A   Braces:       Recommendations:     Discharge Recommendations: home health OT  Level of Assistance Recommended at Discharge: Intermittent assistance for ADL's and homemaking tasks  Discharge Equipment Recommendations:  bedside commode, walker, rolling, wheelchair (W/C with 18x16 seat, swing away desk length arm rests and swing away fot rests with heel loops.)  Barriers to discharge:  Decreased caregiver support    Assessment:     Kamar Muñoz is a 76 y.o. male with a medical diagnosis of Encephalopathy, metabolic   He presents with  Performance deficits affecting function are weakness, impaired endurance, impaired self care skills, impaired functional mobilty, gait instability, impaired balance, decreased coordination, decreased upper extremity function, pain, decreased lower extremity function, impaired cardiopulmonary response to activity.     Pt. Was cooperative and participated well with session on this day. Pt  continues to demonstrate levels of physical deficits with  functional indep with daily management activities tasks, selfcare skills with balance,  functional mobility, UB strength and endurance. Pt. Will continue to benefit from continued OT to progress towards goals     Rehab Potential is fair    Activity tolerance:  Fair    Plan:     Patient to be seen 6 x/week to address the above listed problems via self-care/home management, therapeutic activities, therapeutic exercises    · Plan of Care Expires: 04/11/22  · Plan of Care Reviewed with: patient    Subjective     Communicated with: nsg and Pt. prior to session. When will  I be able to go to the bathroom by myself     Pain/Comfort:  · Pain Rating 1: 4/10  · Location - Side 1: Bilateral  · Location - Orientation 1:  generalized  · Location 1: back  · Pain Addressed 1: Pre-medicate for activity, Reposition    Patient's cultural, spiritual, Sikh conflicts given the current situation:  · no    Objective:     Patient found supine with   upon OT entry to room.    Bed Mobility:    · Patient completed Scooting/Bridging with contact guard assistance  · Patient completed Supine to Sit with contact guard assistance     Functional Mobility/Transfers:  · Patient completed Sit <> Stand Transfer with contact guard assistance  with  rolling walker   · Patient completed Bed <> Chair Transfer using Stand Pivot technique with contact guard assistance with rolling walker  · Patient completed Toilet Transfer Stand Pivot technique with contact guard assistance with  rolling walker    Activities of Daily Living:  · Grooming: modified independence seated at sink level  · Upper Body Dressing: stand by assistance to doff/pema shirt. Pt. With water spillage on shirt   · Toileting: contact guard assistance wtih clothing management from standard toilet    [facility] 6 Click ADL: 17    OT Exercises: UE Ergometer 10 min    Treatment & Education:   Pt. Daughter did not show up for family training on this day Pt. Attempted to call daughter Basia twice and unsuccessful with attempts    Pt. With 4# dowel activity with 2x20 reps with  shd flex, bicep curls horz adb/add and forward flex motion to increase BUE ROM and strength.    Pt. With therex performed to increase ROM, endurance selfcare task and fxl mobility for independence     Pt edu on role of OT, POC, safety when performing self care tasks , benefit of performing OOB activity, and safety when performing functional transfers and mobility management for preparation with goals to progress towards next level of care    Patient left up in chair with all lines intact and call button in reachEducation:      GOALS:   Multidisciplinary Problems     Occupational Therapy Goals        Problem: Occupational Therapy  Goal    Goal Priority Disciplines Outcome Interventions   Occupational Therapy Goal     OT, PT/OT Ongoing, Progressing    Description: Goals to be met by: 3/31/22     Patient will increase functional independence with ADLs by performing:    UE Dressing with Modified [place].  LE Dressing with Set-up Assistance.  Grooming while seated at sink with Modified [place]. Ongoing  Toileting from toilet or BSC with Supervision for hygiene and clothing management.   Bathing from sitting on shower seat with Supervision.  Supine to sit with Modified [place].  Stand pivot transfers with Modified [place] using A/D as needed..  Toilet transfer to toilet or BSC with Modified [place] using A/D as needed  Upper extremity exercise using UBE with mod resistance for 10 minutes to increased functional   endurance to perform functional tasks and mobility.     Ongoing  Caregiver will be educated on level of assist required to safely perform self care tasks and functional transfers..                     Time Tracking:     OT Date of Treatment: OT Date of Treatment: 03/21/22  OT Total Time (min):      Billable Minutes:Therapeutic Activity 38    3/21/2022

## 2022-03-21 NOTE — PT/OT/SLP PROGRESS
"Physical Therapy Treatment    Patient Name:  Kamar Muñoz   MRN:  [id number]  Admit Date: 3/10/2022  Admitting Diagnosis: Encephalopathy, metabolic  Recent Surgeries:     General Precautions: Standard, fall   Orthopedic Precautions:N/A   Braces:       Recommendations:     Discharge Recommendations:  home health PT   Level of Assistance Recommended at Discharge: Intermittent assistance   Discharge Equipment Recommendations: bedside commode, walker, rolling, wheelchair   Barriers to discharge: Decreased caregiver support    Assessment:     Kamar Muñoz is a 76 y.o. male admitted with a medical diagnosis of Encephalopathy, metabolic . Pt tolerated well, pt would continue to benefit from skilled PT services to improve overall functional mobility, strength and endurance.  .      Performance deficits affecting function:  weakness, gait instability, impaired balance, impaired functional mobilty, decreased safety awareness, decreased coordination .    Rehab Potential is good    Activity Tolerance: Good and Fair    Plan:     Patient to be seen 6 x/week to address the above listed problems via gait training, therapeutic activities, therapeutic exercises, neuromuscular re-education, wheelchair management/training    · Plan of Care Expires: 04/10/22  · Plan of Care Reviewed with: patient    Subjective     Daughter did not show for family training, no answer when phoned. Pt will call later to reschedule.  "alright" agreeable to therapy    Pain/Comfort:  · Pain Rating 1: 7/10  · Location - Side 1: Bilateral  · Location - Orientation 1: posterior  · Location 1:  (sacral region)  · Pain Addressed 1: Reposition, Distraction, Pre-medicate for activity  · Pain Rating Post-Intervention 1: 7/10    Patient's cultural, spiritual, Jewish conflicts given the current situation:  · no    Objective:     Patient found up in WC upon PT entry to room.     Therapeutic Activities and Exercises: Supine BLE exercises x15 SAQ, supine " bridges, Hip Abd with lateral manual resistance, AP with manual resistance of plantar flexion, Heel slides with manual resistance of knee flexion    Functional Mobility:  · Bed Mobility:     · Sit to Supine: supervision  · Transfers:     · Sit to Stand:  stand by assistance with rolling walker  · Gait: amb with RW ~82'  X 2 trials seated rest break no LOB, close SBA with vcs to widen NICOLE especiall y when negotiating turns    AM-PAC 6 CLICK MOBILITY  17    Patient left right sidelying with call button in reach and belongings within reach.    GOALS:   Multidisciplinary Problems     Physical Therapy Goals        Problem: Physical Therapy Goal    Goal Priority Disciplines Outcome Goal Variances Interventions   Physical Therapy Goal     PT, PT/OT Ongoing, Progressing     Description: Goals to be met by: 3/25/22    Patient will increase functional independence with mobility by performing:    . Supine to sit with [place]  . Sit to supine with [place]  . Rolling to Left and Right with [place].  . Sit to stand transfer with Supervision  . Bed to chair transfer with Supervision using No Assistive Device  . Gait  x 150 feet with Supervision using Rolling Walker.   . Wheelchair propulsion x150 feet with Supervision using bilateral uppper extremities  . Ascend/descend 4 stair with bilateral Handrails Contact Guard Assistance using No Assistive Device.   . Ascend/Descend 4 inch curb step with Contact Guard Assistance using Rolling Walker. - Met 03/19  . Retrieve object off floor in standing with RW and reacher and SBA.- Met 03/19                     Time Tracking:     PT Received On: 03/21/22  PT Total Time (min):       Billable Minutes: Gait Training 15 and Therapeutic Exercise 12      Treatment Type: Treatment  PT/PTA: PTA     PTA Visit Number: 2     03/21/2022

## 2022-03-21 NOTE — PLAN OF CARE
Pt. Blood sugar monitored ac/hs and covered as needed and routine dose given as ordered.will continue to monitor.   Statement Selected

## 2022-03-21 NOTE — PROGRESS NOTES
[facility]                                  Skilled Nursing Facility                   Progress Note     Admit Date: 3/10/2022  ALLIE 3/25/2022  Principal Problem:  Encephalopathy, metabolic   HPI obtained from patient interview and chart review     Chief Complaint: Re-evaluation of blood glucose levels    HPI:   Kamar Muñoz is a 76 year old male with PMHx of CAD s/p 2 LAUREN in RCA (Jan 2022), HTN, HLD, p. A. 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  Admission to SNF for secondary weakness debility, continued insulin adjustments     Interval history:  Patient blood glucose levels have been very difficult to control.  Patient initially admitted to SNF with blood glucoses in the 3-400s.  Multiple adjustments have been made to his regimen.  His current 24 hour range is 111-190.  INR was that patient will have a hypoglycemic event, will decrease scheduled as part in hopes to avoid this.    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    Past Surgical History: Patient has a past surgical history that includes Back surgery; Eye surgery; Shoulder open rotator cuff repair; renal stones; Hernia repair; Colonoscopy (N/A, 1/28/2019); Cataract extraction w/  intraocular lens implant (Right); and Left heart catheterization (Left, 1/9/2022).    Social History: Patient reports that he quit smoking about 39 years ago. He has a 37.50 pack-year  smoking history. He has never used smokeless tobacco. He reports that he does not drink alcohol and does not use drugs.    Family History:  No significant family history to report.    Allergies: Patient is allergic to iodine.    ROS  Constitutional: Negative for fever.  +appetite change   Eyes: Negative for blurred vision, double vision   Respiratory: Negative for shortness of breath.  +mild productive cough, nasal congestion   Cardiovascular: Negative for chest pain, palpitations, and leg swelling.   Gastrointestinal: Negative for abdominal pain, constipation, diarrhea, nausea, vomiting.   Genitourinary: Negative for dysuria, frequency   Musculoskeletal:  + generalized weakness. Negative for back pain and myalgias.   Skin: Negative for itching and rash.   Neurological: Negative for dizziness, headaches.   Psychiatric/Behavioral: Negative for depression. The patient is not nervous/anxious.      24 hour Vital Sign Range   Temp:  [97.7 °F (36.5 °C)-97.9 °F (36.6 °C)]   Pulse:  [88-89]   Resp:  [19-20]   BP: (120-136)/(69-76)   SpO2:  [97 %-98 %]     PEx  Constitutional: Patient appears debilitated.  No distress noted  HENT:   Head: Normocephalic and atraumatic.   Eyes: Pupils are equal, round  Neck: Normal range of motion. Neck supple.   Cardiovascular: Normal rate, regular rhythm and normal heart sounds.    Pulmonary/Chest: Effort normal and breath sounds are clear  Abdominal: Soft. Bowel sounds are normal.   Musculoskeletal: Normal range of motion.   Neurological: Alert and oriented to person, place, and time.   Psychiatric: Normal mood and affect. Behavior is normal.   Skin: Skin is warm and dry.    Wound that you did not assess today:  Wound location(s)/type(s):  Buttocks  Not visualized today. No signs/symptoms of infection or wound-related changes reported.       Recent Labs   Lab 03/21/22  0623      K 4.6      CO2 29   BUN 20   CREATININE 1.0   MG 1.5*       Recent Labs   Lab 03/21/22  0623   WBC  6.87   RBC 3.97*   HGB 11.4*   HCT 36.3*      MCV 91   MCH 28.7   MCHC 31.4*         Assessment and Plan:       Type 2 diabetes mellitus with hyperglycemia, with long-term current use of insulin  - Per [facility], Patient with uncontrolled DM presenting with metabolic encephalopathy in the setting of DKA with coma. Suspect patient developed DKA in the setting of sepsis/ COVID-19- DKA protocol completed and DKA resolved in MICU and Pt stepped down on subq insulin.  Endocrine followed   - Diabetic diet, Accu-Cheks AC/HS  - home regimen with Levemir 20 units daily, NovoLog 9 units TID WM, metformin 1000 mg BID  - 3/18 continue detemir to 10 units BID, decreased aspart to 5 units TID WM    Hypomagnesemia  - magnesium oxide 400 mg BID x2 days    Nasal congestion  - continue Zyrtec 10 mg qHS., Flonase daily    COVID-19 virus infection  Viral Pneumonia due to COVID-19  - COVID vaccinated with booster.   - Received 1 dose of sotrovimab infusion 02/18/2022  - Completed 5 days of remdesivir, had dexamethasone held initially due to DKA.  - Stable on room air  - End isolation on 3/9/2022  - continues to have mild productive cough   - PRN DuoNebs     Hypertension associated with diabetes  - home regimen with losartan 25mg, Toprol xl 50mg, diltiazem 120mg at home  - continue losartan 25mg daily     Paroxysmal atrial fibrillation  - home Metoprolol XL 50 mg daily, diltiazem  mg daily and amiodarone 200 mg daily, apixaban 5mg BID  - continue amiodarone 20 mg daily, apixaban 5 mg BID. Holding metoprolol due to hypotension/bradycardia, holding diltiazem due to hypotension     Coronary artery disease involving native coronary artery of native heart with unstable angina pectoris  HLD  - Hx of CAD s/p placement of 2 LAUREN in RCA in 01/09/2022.   - Continue home ASA, Plavix and statin  - Chest pain 3/4 after breakfast; EKG nonischemic. PPI, Tums, carafate added     Pulmonary nodules  - Has stable bilateral pulmonary nodules/masses  with broad differential per outpatient pulmonology notes. No pathology report as none of the nodules appeared to be safe for biopsy given high risk of PTX is high with many adjacent bulla. Plan working on promoting functional independence for the time being with possible addressing of nodules in the future, which is highly dependent upon his overall functionality.     Chronic pulmonary heart disease  - Follows up with Pulmonary clinic in [place]. Last seen in December and was evaluated for pulmonary nodules. Deemed to have mild COPD per notes. Not on any maintenance therapy.  - Continue albuterol inhaler   - likely needs follow-up with Pulmonary after discharge from [facility]     Embolic stroke involving right middle cerebral artery  - Hx of CVA in Jan 2022.   - CT Head with evolving infarcts in right frontal and left cerebellar hemispheres.   - Has had issues with memory per family since CVA   - No concern for acute stroke this admit per discussion with Radiology.   - Continue home antiplatelets, statin and Eliquis     Focal seizures  - Continue home lancosamide      Pressure injury of buttock, unstageable  - Seen by wound care with [facility] started  - Continues to cause patient discomfort  - wound care consulted at [facility]     Vitamin-D deficiency  - continue ergocalciferol 1000 units weekly     Hypomagnesemia  - magnesium oxide 400 mg BID      Peripheral neuropathy  - continue gabapentin 200 mg TID     Malnutrition of moderate degree  - RD following, appreciate recommendations, continue supplements as ordered     Debility   - Continue with PT/OT for gait training and strengthening and restoration of ADL's   - Encourage mobility, OOB in chair, and early ambulation as appropriate  - Fall precautions   - Monitor for bowel and bladder dysfunction  - Monitor for and prevent skin breakdown and pressure ulcers  - Continue DVT prophylaxis with apixaban        Anticipate disposition:  Home with home health        Follow-up needed  during SNF stay-     Appointment not to send patient to- Dr. Kumar (3/21)     Follow-up needed after discharge from SNF:      - PCP - 4/6  - pulmonary- needs to be scheduled- post COVID/COPD   - Endocrinology- needs to be scheduled- diabetes mellitus type 2       Future Appointments   Date Time Provider Department Center   4/6/2022  1:00 PM Basim Guerrero MD [facility]       Jazmín Zambrano NP  Department of Hospital Medicine   [facility] Sk[facility]     DOS: 3/18/2022       Patient note was created using MModal Dictation.  Any errors in syntax or even information may not have been identified and edited on initial review prior to signing this note.

## 2022-03-21 NOTE — PROGRESS NOTES
[facility]                                  Skilled Nursing Facility                   Progress Note     Admit Date: 3/10/2022  ALLIE 3/25/2022  Principal Problem:  Encephalopathy, metabolic   HPI obtained from patient interview and chart review     Chief Complaint: Re-evaluation of medical treatment and therapy status: Lab review, hyperglycemia    HPI:   Kamar Muñoz is a 76 year old male with PMHx of CAD s/p 2 LAUREN in RCA (Jan 2022), HTN, HLD, p. A. 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  Admission to SNF for secondary weakness debility, continued insulin adjustments     Interval history: All of today's labs reviewed and are listed below.  Mag 1.5.  24 hr vital sign ranges listed below.  24 hour blood glucose range is .   Patient states he has been eating consistently with each meal.  Continues to endorse discomfort due to skin breakdown to buttocks.  Patient denies shortness of breath, abdominal discomfort, nausea, or vomiting.  Patient reports an adequate appetite.  Patient denies dysuria.  Patient reports having regular bowel movements.  Patient progessing with PT/OT-Gait: amb with RW ~82'  X 2 trials seated rest break no LOB, close SBA with vcs to widen NICOLE especiall y when negotiating turns. Continuing to follow and treat all acute and chronic conditions.    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    Past  Surgical History: Patient has a past surgical history that includes Back surgery; Eye surgery; Shoulder open rotator cuff repair; renal stones; Hernia repair; Colonoscopy (N/A, 1/28/2019); Cataract extraction w/  intraocular lens implant (Right); and Left heart catheterization (Left, 1/9/2022).    Social History: Patient reports that he quit smoking about 39 years ago. He has a 37.50 pack-year smoking history. He has never used smokeless tobacco. He reports that he does not drink alcohol and does not use drugs.    Family History:  No significant family history to report.    Allergies: Patient is allergic to iodine.    ROS  Constitutional: Negative for fever.  +appetite change   Eyes: Negative for blurred vision, double vision   Respiratory: Negative for shortness of breath.  +mild productive cough, nasal congestion   Cardiovascular: Negative for chest pain, palpitations, and leg swelling.   Gastrointestinal: Negative for abdominal pain, constipation, diarrhea, nausea, vomiting.   Genitourinary: Negative for dysuria, frequency   Musculoskeletal:  + generalized weakness. Negative for back pain and myalgias.   Skin: Negative for itching and rash.   Neurological: Negative for dizziness, headaches.   Psychiatric/Behavioral: Negative for depression. The patient is not nervous/anxious.      24 hour Vital Sign Range   Temp:  [97.7 °F (36.5 °C)-97.9 °F (36.6 °C)]   Pulse:  [88-89]   Resp:  [19-20]   BP: (120-136)/(69-76)   SpO2:  [97 %-98 %]     PEx  Constitutional: Patient appears debilitated.  No distress noted  HENT:   Head: Normocephalic and atraumatic.   Eyes: Pupils are equal, round  Neck: Normal range of motion. Neck supple.   Cardiovascular: Normal rate, regular rhythm and normal heart sounds.    Pulmonary/Chest: Effort normal and breath sounds are clear  Abdominal: Soft. Bowel sounds are normal.   Musculoskeletal: Normal range of motion.   Neurological: Alert and oriented to person, place, and time.   Psychiatric:  Normal mood and affect. Behavior is normal.   Skin: Skin is warm and dry.     WOUND  Date identified - POA- 3/10/2022  Location:  Sacrum and left buttocks  Type:  Partial thickness tissue loss  Stage (if pressure): II  Appearance:  Red, moist  0.7cm x 0.7cm x 0.2cm   Undermining/tunneling: No   Drainage:  None  Odor: none   Edges: Irregular edges  Periwound:  Ecchymotic/excoriated  Progress:   decreased in size    Recent Labs   Lab 03/21/22  0623      K 4.6      CO2 29   BUN 20   CREATININE 1.0   MG 1.5*       Recent Labs   Lab 03/21/22  0623   WBC 6.87   RBC 3.97*   HGB 11.4*   HCT 36.3*      MCV 91   MCH 28.7   MCHC 31.4*         Assessment and Plan:       Type 2 diabetes mellitus with hyperglycemia, with long-term current use of insulin  - Per [facility], Patient with uncontrolled DM presenting with metabolic encephalopathy in the setting of DKA with coma. Suspect patient developed DKA in the setting of sepsis/ COVID-19- DKA protocol completed and DKA resolved in MICU and Pt stepped down on subq insulin.  Endocrine followed   - Diabetic diet, Accu-Cheks AC/HS  - home regimen with Levemir 20 units daily, NovoLog 9 units TID WM, metformin 1000 mg BID  - 3/21 detemir to 10 units BID, changed aspart to 7 units TID WM, initiate metformin 500 mg BID    Hypomagnesemia  - initiated magnesium oxide 400 mg BID x2 days    Nasal congestion  - continue Zyrtec 10 mg qHS., Flonase daily    COVID-19 virus infection  Viral Pneumonia due to COVID-19  - COVID vaccinated with booster.   - Received 1 dose of sotrovimab infusion 02/18/2022  - Completed 5 days of remdesivir, had dexamethasone held initially due to DKA.  - Stable on room air  - End isolation on 3/9/2022  - continues to have mild productive cough   - initiated PRN DuoNebs     Hypertension associated with diabetes  - home regimen with losartan 25mg, Toprol xl 50mg, diltiazem 120mg at home  - continue losartan 25mg daily     Paroxysmal atrial fibrillation  -  home Metoprolol XL 50 mg daily, diltiazem  mg daily and amiodarone 200 mg daily, apixaban 5mg BID  - continue amiodarone 20 mg daily, apixaban 5 mg BID. Holding metoprolol due to hypotension/bradycardia, holding diltiazem due to hypotension     Coronary artery disease involving native coronary artery of native heart with unstable angina pectoris  HLD  - Hx of CAD s/p placement of 2 LAUREN in RCA in 01/09/2022.   - Continue home ASA, Plavix and statin  - Chest pain 3/4 after breakfast; EKG nonischemic. PPI, Tums, carafate added     Pulmonary nodules  - Has stable bilateral pulmonary nodules/masses with broad differential per outpatient pulmonology notes. No pathology report as none of the nodules appeared to be safe for biopsy given high risk of PTX is high with many adjacent bulla. Plan working on promoting functional independence for the time being with possible addressing of nodules in the future, which is highly dependent upon his overall functionality.     Chronic pulmonary heart disease  - Follows up with Pulmonary clinic in [place]. Last seen in December and was evaluated for pulmonary nodules. Deemed to have mild COPD per notes. Not on any maintenance therapy.  - Continue albuterol inhaler   - likely needs follow-up with Pulmonary after discharge from [facility]     Embolic stroke involving right middle cerebral artery  - Hx of CVA in Jan 2022.   - CT Head with evolving infarcts in right frontal and left cerebellar hemispheres.   - Has had issues with memory per family since CVA   - No concern for acute stroke this admit per discussion with Radiology.   - Continue home antiplatelets, statin and Eliquis     Focal seizures  - Continue home lancosamide      Pressure injury of buttock, unstageable  - Seen by wound care with Triad started  - Continues to cause patient discomfort  - initiate wound care consult at [facility]     Vitamin-D deficiency  - continue ergocalciferol 1000 units weekly     Hypomagnesemia  - magnesium  oxide 400 mg BID      Peripheral neuropathy  - continue gabapentin 200 mg TID     Malnutrition of moderate degree  - RD following, appreciate recommendations, continue supplements as ordered     Debility   - Continue with PT/OT for gait training and strengthening and restoration of ADL's   - Encourage mobility, OOB in chair, and early ambulation as appropriate  - Fall precautions   - Monitor for bowel and bladder dysfunction  - Monitor for and prevent skin breakdown and pressure ulcers  - Continue DVT prophylaxis with apixaban        Anticipate disposition:  Home with home health        Follow-up needed during SNF stay-     Appointment not to send patient to- Dr. Kumar (3/21)     Follow-up needed after discharge from SNF:      - PCP - 4/6  - pulmonary- needs to be scheduled- post COVID/COPD   - Endocrinology- needs to be scheduled- diabetes mellitus type 2       Future Appointments   Date Time Provider Department Center   4/6/2022  1:00 PM Basim Guerrero MD [facility]       Jazmín Zambrano NP  Department of Hospital Medicine   [facility] Skilled [facility] Facility     DOS: 3/21/2022       Patient note was created using MModal Dictation.  Any errors in syntax or even information may not have been identified and edited on initial review prior to signing this note.

## 2022-03-22 LAB
POCT GLUCOSE: 150 MG/DL (ref 70–110)
POCT GLUCOSE: 173 MG/DL (ref 70–110)
POCT GLUCOSE: 216 MG/DL (ref 70–110)
POCT GLUCOSE: 313 MG/DL (ref 70–110)
POCT GLUCOSE: 80 MG/DL (ref 70–110)
SARS-COV-2 RNA RESP QL NAA+PROBE: NOT DETECTED
SARS-COV-2- CYCLE NUMBER: NORMAL

## 2022-03-22 PROCEDURE — 97110 THERAPEUTIC EXERCISES: CPT | Mod: CQ

## 2022-03-22 PROCEDURE — 11000004 HC SNF PRIVATE

## 2022-03-22 PROCEDURE — 97116 GAIT TRAINING THERAPY: CPT | Mod: CQ

## 2022-03-22 PROCEDURE — 97530 THERAPEUTIC ACTIVITIES: CPT | Mod: CQ

## 2022-03-22 PROCEDURE — 25000003 PHARM REV CODE 250: Performed by: NURSE PRACTITIONER

## 2022-03-22 PROCEDURE — 97530 THERAPEUTIC ACTIVITIES: CPT | Mod: CO

## 2022-03-22 PROCEDURE — 25000003 PHARM REV CODE 250: Performed by: HOSPITALIST

## 2022-03-22 RX ADMIN — CETIRIZINE HYDROCHLORIDE 10 MG: 5 TABLET ORAL at 09:03

## 2022-03-22 RX ADMIN — DICLOFENAC SODIUM 4 G: 10 GEL TOPICAL at 02:03

## 2022-03-22 RX ADMIN — METFORMIN HYDROCHLORIDE 500 MG: 500 TABLET ORAL at 05:03

## 2022-03-22 RX ADMIN — LACOSAMIDE 100 MG: 50 TABLET, FILM COATED ORAL at 09:03

## 2022-03-22 RX ADMIN — LACOSAMIDE 100 MG: 50 TABLET, FILM COATED ORAL at 08:03

## 2022-03-22 RX ADMIN — BACITRACIN ZINC, NEOMYCIN SULFATE, AND POLYMYXIN B SULFATE: 400; 3.5; 5 OINTMENT TOPICAL at 09:03

## 2022-03-22 RX ADMIN — THERA TABS 1 TABLET: TAB at 08:03

## 2022-03-22 RX ADMIN — INSULIN ASPART 7 UNITS: 100 INJECTION, SOLUTION INTRAVENOUS; SUBCUTANEOUS at 12:03

## 2022-03-22 RX ADMIN — Medication 500 MG: at 09:03

## 2022-03-22 RX ADMIN — AMIODARONE HYDROCHLORIDE 200 MG: 200 TABLET ORAL at 08:03

## 2022-03-22 RX ADMIN — SUCRALFATE 1 G: 1 TABLET ORAL at 05:03

## 2022-03-22 RX ADMIN — ATORVASTATIN CALCIUM 40 MG: 20 TABLET, FILM COATED ORAL at 08:03

## 2022-03-22 RX ADMIN — INSULIN ASPART 2 UNITS: 100 INJECTION, SOLUTION INTRAVENOUS; SUBCUTANEOUS at 12:03

## 2022-03-22 RX ADMIN — CLOPIDOGREL 75 MG: 75 TABLET, FILM COATED ORAL at 08:03

## 2022-03-22 RX ADMIN — APIXABAN 5 MG: 2.5 TABLET, FILM COATED ORAL at 09:03

## 2022-03-22 RX ADMIN — GABAPENTIN 200 MG: 100 CAPSULE ORAL at 08:03

## 2022-03-22 RX ADMIN — GABAPENTIN 200 MG: 100 CAPSULE ORAL at 09:03

## 2022-03-22 RX ADMIN — SUCRALFATE 1 G: 1 TABLET ORAL at 12:03

## 2022-03-22 RX ADMIN — Medication 500 MG: at 08:03

## 2022-03-22 RX ADMIN — ASPIRIN 81 MG: 81 TABLET, COATED ORAL at 08:03

## 2022-03-22 RX ADMIN — INSULIN ASPART 4 UNITS: 100 INJECTION, SOLUTION INTRAVENOUS; SUBCUTANEOUS at 05:03

## 2022-03-22 RX ADMIN — INSULIN ASPART 7 UNITS: 100 INJECTION, SOLUTION INTRAVENOUS; SUBCUTANEOUS at 05:03

## 2022-03-22 RX ADMIN — LOSARTAN POTASSIUM 25 MG: 25 TABLET, FILM COATED ORAL at 08:03

## 2022-03-22 RX ADMIN — PANTOPRAZOLE SODIUM 40 MG: 40 TABLET, DELAYED RELEASE ORAL at 08:03

## 2022-03-22 RX ADMIN — Medication 400 MG: at 09:03

## 2022-03-22 RX ADMIN — Medication 400 MG: at 08:03

## 2022-03-22 RX ADMIN — SUCRALFATE 1 G: 1 TABLET ORAL at 08:03

## 2022-03-22 RX ADMIN — METFORMIN HYDROCHLORIDE 500 MG: 500 TABLET ORAL at 08:03

## 2022-03-22 RX ADMIN — INSULIN ASPART 7 UNITS: 100 INJECTION, SOLUTION INTRAVENOUS; SUBCUTANEOUS at 08:03

## 2022-03-22 RX ADMIN — APIXABAN 5 MG: 2.5 TABLET, FILM COATED ORAL at 08:03

## 2022-03-22 RX ADMIN — DICLOFENAC SODIUM 4 G: 10 GEL TOPICAL at 09:03

## 2022-03-22 RX ADMIN — OXYCODONE 5 MG: 5 TABLET ORAL at 09:03

## 2022-03-22 RX ADMIN — DICLOFENAC SODIUM 4 G: 10 GEL TOPICAL at 08:03

## 2022-03-22 RX ADMIN — SENNOSIDES AND DOCUSATE SODIUM 1 TABLET: 50; 8.6 TABLET ORAL at 08:03

## 2022-03-22 RX ADMIN — GABAPENTIN 200 MG: 100 CAPSULE ORAL at 02:03

## 2022-03-22 NOTE — PT/OT/SLP PROGRESS
Occupational Therapy   Treatment    Name: Kamar Muñoz  MRN: [id number]  Admit Date: 3/10/2022  Admitting Diagnosis:  Encephalopathy, metabolic    General Precautions: Standard, fall   Orthopedic Precautions:N/A   Braces:       Recommendations:     Discharge Recommendations: home health OT  Level of Assistance Recommended at Discharge: Intermittent assistance for ADL's and homemaking tasks  Discharge Equipment Recommendations:  bedside commode, walker, rolling, wheelchair (W/C with 18x16 seat, swing away desk length arm rests and swing away fot rests with heel loops.)  Barriers to discharge:  Decreased caregiver support    Assessment:     Kamar Muñoz is a 76 y.o. male with a medical diagnosis of Encephalopathy, metabolic   He presents with  Performance deficits affecting function are weakness, impaired endurance, impaired self care skills, impaired functional mobilty, gait instability, impaired balance, decreased coordination, decreased upper extremity function, pain, decreased lower extremity function, impaired cardiopulmonary response to activity.     Pt. Was cooperative and participated well with session on this day. Pt  continues to demonstrate levels of physical deficits with  functional indep with daily management activities tasks, selfcare skills with balance,  functional mobility, UB strength and endurance. Pt. Will continue to benefit from continued OT to progress towards goals     Rehab Potential is fair    Activity tolerance:  Fair    Plan:     Patient to be seen 6 x/week to address the above listed problems via self-care/home management, therapeutic activities, therapeutic exercises    · Plan of Care Expires: 04/11/22  · Plan of Care Reviewed with: patient    Subjective     Communicated with: nsg and Pt. prior to session. When will  I be able to go to the bathroom by myself     Pain/Comfort:  Pain Rating 1: 5/10  Location - Side 1: Bilateral  Location - Orientation 1: posterior  Location 1:   (sacral region)  Pain Addressed 1: Pre-medicate for activity, Reposition  Pain Rating Post-Intervention 1: 5/10    Patient's cultural, spiritual, Nondenominational conflicts given the current situation:  no    Objective:     Patient found up in chair   upon OT entry to room.    Bed Mobility:    · Patient completed Sit to Supine with minimum assistance for BLE management      Functional Mobility/Transfers:  · Patient completed Sit <> Stand Transfer with contact guard assistance  with  rolling walker   · Patient completed Bed <> Chair Transfer using Stand Pivot technique with contact guard assistance with rolling walker  · Patient completed Toilet Transfer Stand Pivot technique with contact guard assistance with  rolling walker    Activities of Daily Living:  · Grooming: modified independence seated at sink level  · Toileting: contact guard assistance wtih clothing management from standard toilet    [facility] 6 Click ADL: 17    OT Exercises: UE Ergometer 10 min    Treatment & Education:     Pt. With 4#dumb belll activity with 2x15 reps with shd flex, bicep curls horz adb/add and forward flex motion to increase BUE ROM and strength.    Pt. With therex performed to increase ROM, endurance selfcare task and fxl mobility for independence     Pt edu on role of OT, POC, safety when performing self care tasks , benefit of performing OOB activity, and safety when performing functional transfers and mobility management for preparation with goals to progress towards next level of care    Patient left supine with all lines intact and call button in reachEducation:      GOALS:   Multidisciplinary Problems     Occupational Therapy Goals        Problem: Occupational Therapy Goal    Goal Priority Disciplines Outcome Interventions   Occupational Therapy Goal     OT, PT/OT Ongoing, Progressing    Description: Goals to be met by: 3/31/22     Patient will increase functional independence with ADLs by performing:    UE Dressing with Modified  [place].  LE Dressing with Set-up Assistance.  Grooming while seated at sink with Modified [place]. Ongoing  Toileting from toilet or BSC with Supervision for hygiene and clothing management.   Bathing from sitting on shower seat with Supervision.  Supine to sit with Modified [place].  Stand pivot transfers with Modified [place] using A/D as needed..  Toilet transfer to toilet or BSC with Modified [place] using A/D as needed  Upper extremity exercise using UBE with mod resistance for 10 minutes to increased functional   endurance to perform functional tasks and mobility.     Ongoing  Caregiver will be educated on level of assist required to safely perform self care tasks and functional transfers..                     Time Tracking:     OT Date of Treatment: OT Date of Treatment: 03/22/22  OT Total Time (min):      Billable Minutes:Therapeutic Activity 38    3/22/2022

## 2022-03-22 NOTE — PT/OT/SLP PROGRESS
"Physical Therapy Treatment    Patient Name:  Kamar Muñoz   MRN:  [id number]  Admit Date: 3/10/2022  Admitting Diagnosis: Encephalopathy, metabolic  Recent Surgeries:     General Precautions: Standard, fall   Orthopedic Precautions:N/A   Braces:       Recommendations:     Discharge Recommendations:  home health PT   Level of Assistance Recommended at Discharge: Intermittent assistance   Discharge Equipment Recommendations: bedside commode, walker, rolling, wheelchair   Barriers to discharge: Decreased caregiver support    Assessment:     Kamar Muñoz is a 76 y.o. male admitted with a medical diagnosis of Encephalopathy, metabolic Pt tolerated well, demo'd improved endurance with amb. pt would continue to benefit from skilled PT services to improve overall functional mobility, strength and endurance.  .      Performance deficits affecting function:  weakness, gait instability, impaired balance, impaired functional mobilty, decreased safety awareness, decreased coordination .    Rehab Potential is excellent    Activity Tolerance: Good    Plan:     Patient to be seen 6 x/week to address the above listed problems via gait training, therapeutic activities, therapeutic exercises, neuromuscular re-education, wheelchair management/training    · Plan of Care Expires: 04/10/22  · Plan of Care Reviewed with: patient    Subjective     "yeah I guess we can do it. Whatcha want from me?"     Pain/Comfort:  · Pain Rating 1:  (did not report)    Patient's cultural, spiritual, Latter day conflicts given the current situation:  · no    Objective:     Patient found right sidelying in bed upon PT entry to room.     Therapeutic Activities and Exercises: Seated BLE AP, LAQ, Hip Abd/Add    Functional Mobility:  · Bed Mobility: Supine to Sit SBA/S HOB slightly elevated   · Transfers:     · Sit to Stand:  stand by assistance and contact guard assistance with rolling walker vcs to ensure breaks and hand placement  · Gait: amb with RW " "70' + 120' CGA/close SBA seated rest break vcs for inc NICOLE especially when negotiating turns,  plan to wear shoes tomorrow  · Stairs:  Pt ascended/descended 4" curb step with Rolling Walker with Contact Guard Assistance.   · Wheelchair Propulsion:  Pt propelled Standard wheelchair x 150'  feet on Level tile with  Bilateral upper extremity and Bilateral lower extremity with Supervision or Set-up Assistance.     AM-PAC 6 CLICK MOBILITY  17    Patient left up in chair with call button in reach and belongings in reach.    GOALS:   Multidisciplinary Problems     Physical Therapy Goals        Problem: Physical Therapy Goal    Goal Priority Disciplines Outcome Goal Variances Interventions   Physical Therapy Goal     PT, PT/OT Ongoing, Progressing     Description: Goals to be met by: 3/25/22    Patient will increase functional independence with mobility by performing:    . Supine to sit with [place]  . Sit to supine with [place]  . Rolling to Left and Right with [place].  . Sit to stand transfer with Supervision  . Bed to chair transfer with Supervision using No Assistive Device  . Gait  x 150 feet with Supervision using Rolling Walker.   . Wheelchair propulsion x150 feet with Supervision using bilateral uppper extremities  . Ascend/descend 4 stair with bilateral Handrails Contact Guard Assistance using No Assistive Device.   . Ascend/Descend 4 inch curb step with Contact Guard Assistance using Rolling Walker. - Met 03/19  . Retrieve object off floor in standing with RW and reacher and SBA.- Met 03/19                     Time Tracking:     PT Received On: 03/22/22  PT Total Time (min):       Billable Minutes: Gait Training 15, Therapeutic Activity 13 and Therapeutic Exercise 10    Treatment Type: Treatment  PT/PTA: PTA     PTA Visit Number: 3     03/22/2022  "

## 2022-03-22 NOTE — PROGRESS NOTES
[facility] - Skilled Nursing  Wound Care    Patient Name:  Kamar Muñoz   MRN:  [id number]  Date: 3/22/2022  Diagnosis: Encephalopathy, metabolic    History:     Past Medical History:   Diagnosis Date    Arthritis     Coronary artery disease     Diabetes mellitus type II     Hyperlipidemia     Hypertension     Kidney stone     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 2012    STEMI involving right coronary artery 2022    Type II or unspecified type diabetes mellitus with neurological manifestations, uncontrolled(250.62) 3/8/2013    Urinary tract infection        Social History     Socioeconomic History    Marital status:    Tobacco Use    Smoking status: Former Smoker     Packs/day: 1.50     Years: 25.00     Pack years: 37.50     Quit date: 1983     Years since quittin.2    Smokeless tobacco: Never Used   Substance and Sexual Activity    Alcohol use: No    Drug use: No    Sexual activity: Yes     Partners: Female   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     Social Determinants of Health     Financial Resource Strain: Low Risk     Difficulty of Paying Living Expenses: Not hard at all   Food Insecurity: No Food Insecurity    Worried About Running Out of Food in the Last Year: Never true    Ran Out of Food in the Last Year: Never true   Transportation Needs: No Transportation Needs    Lack of Transportation (Medical): No    Lack of Transportation (Non-Medical): No   Housing Stability: Low Risk     Unable to Pay for Housing in the Last Year: No    Number of Places Lived in the Last Year: 1    Unstable Housing in the Last Year: No       Precautions:     Allergies as of 03/10/2022 - Reviewed 03/10/2022   Allergen Reaction Noted    Iodine  2012       WO Assessment Details/Treatment   Wound care follow-up  The left buttocks and sacral area wound beds are red/moist with erythema, painful, no induration  Mr. Muñoz stays on his side with  pillows for support.    Plan:  Left buttocks/sacral area- triple antibiotic to wounds after cleansing, triad ointment to periwound BID/prn cleansing    Nursing to continue care, pressure prevention measures  Wound care will follow-up prn  Recommendations made to primary team for above plan per written report/secure report . Orders placed.      03/22/22 0815        Altered Skin Integrity 03/10/22 1710 upper Coccyx Ulceration Full thickness tissue loss. Subcutaneous fat may be visible but bone, tendon or muscle are not exposed   Date First Assessed/Time First Assessed: 03/10/22 1710   Altered Skin Integrity Present on Admission: yes  Orientation: upper  Location: Coccyx  Primary Wound Type: Ulceration  Description of Altered Skin Integrity: Full thickness tissue loss. Subcuta...   Wound Image    Dressing Appearance Intact;Moist drainage   Drainage Amount Scant   Drainage Characteristics/Odor Clear   Appearance Pink;Moist   Tissue loss description Partial thickness   Periwound Area Intact;Dry;Redness   Wound Edges Open   Wound Length (cm) 0.7 cm   Wound Width (cm) 0.7 cm   Wound Depth (cm) 0.2 cm   Wound Volume (cm^3) 0.098 cm^3   Wound Surface Area (cm^2) 0.49 cm^2   Care Cleansed with:;Applied:;Skin Barrier   Dressing Removed;Foam        Altered Skin Integrity 01/26/22 0841 Sacral spine Purple or maroon localized area of discolored intact skin or non-intact skin or a blood-filled blister.   Date First Assessed/Time First Assessed: 01/26/22 0841   Altered Skin Integrity Present on Admission: yes  Location: Sacral spine  Description of Altered Skin Integrity: Purple or maroon localized area of discolored intact skin or non-intact skin or a...   Description of Altered Skin Integrity Partial thickness tissue loss. Shallow open ulcer with a red or pink wound bed, without slough. Intact or Open/Ruptured Serum-filled blister.   Dressing Appearance Intact;Clean;Moist drainage   Drainage Amount Scant   Drainage  Characteristics/Odor Clear;No odor   Appearance Red;Pink;Moist   Tissue loss description Partial thickness   Periwound Area Intact;Dry;Redness   Wound Edges Irregular;Open   Wound Length (cm) 0.7 cm   Wound Width (cm) 0.7 cm   Wound Depth (cm) 0.2 cm   Wound Volume (cm^3) 0.098 cm^3   Wound Surface Area (cm^2) 0.49 cm^2   Care Cleansed with:;Soap and water;Applied:;Skin Barrier   Dressing Foam     03/22/2022

## 2022-03-23 LAB
POCT GLUCOSE: 105 MG/DL (ref 70–110)
POCT GLUCOSE: 155 MG/DL (ref 70–110)
POCT GLUCOSE: 236 MG/DL (ref 70–110)
POCT GLUCOSE: 281 MG/DL (ref 70–110)
POCT GLUCOSE: 62 MG/DL (ref 70–110)

## 2022-03-23 PROCEDURE — 97110 THERAPEUTIC EXERCISES: CPT | Mod: CQ

## 2022-03-23 PROCEDURE — 97110 THERAPEUTIC EXERCISES: CPT | Mod: CO

## 2022-03-23 PROCEDURE — 97530 THERAPEUTIC ACTIVITIES: CPT | Mod: CQ

## 2022-03-23 PROCEDURE — 25000003 PHARM REV CODE 250: Performed by: HOSPITALIST

## 2022-03-23 PROCEDURE — 25000003 PHARM REV CODE 250: Performed by: NURSE PRACTITIONER

## 2022-03-23 PROCEDURE — 11000004 HC SNF PRIVATE

## 2022-03-23 PROCEDURE — 97116 GAIT TRAINING THERAPY: CPT | Mod: CQ

## 2022-03-23 PROCEDURE — 97535 SELF CARE MNGMENT TRAINING: CPT | Mod: CO

## 2022-03-23 RX ORDER — METFORMIN HYDROCHLORIDE 500 MG/1
1000 TABLET ORAL 2 TIMES DAILY WITH MEALS
Status: DISCONTINUED | OUTPATIENT
Start: 2022-03-23 | End: 2022-04-01 | Stop reason: HOSPADM

## 2022-03-23 RX ADMIN — METFORMIN HYDROCHLORIDE 500 MG: 500 TABLET ORAL at 09:03

## 2022-03-23 RX ADMIN — ATORVASTATIN CALCIUM 40 MG: 20 TABLET, FILM COATED ORAL at 09:03

## 2022-03-23 RX ADMIN — GABAPENTIN 200 MG: 100 CAPSULE ORAL at 08:03

## 2022-03-23 RX ADMIN — BACITRACIN ZINC, NEOMYCIN SULFATE, AND POLYMYXIN B SULFATE: 400; 3.5; 5 OINTMENT TOPICAL at 09:03

## 2022-03-23 RX ADMIN — LACOSAMIDE 100 MG: 50 TABLET, FILM COATED ORAL at 09:03

## 2022-03-23 RX ADMIN — THERA TABS 1 TABLET: TAB at 09:03

## 2022-03-23 RX ADMIN — GABAPENTIN 200 MG: 100 CAPSULE ORAL at 09:03

## 2022-03-23 RX ADMIN — INSULIN ASPART 3 UNITS: 100 INJECTION, SOLUTION INTRAVENOUS; SUBCUTANEOUS at 12:03

## 2022-03-23 RX ADMIN — METFORMIN HYDROCHLORIDE 1000 MG: 500 TABLET ORAL at 05:03

## 2022-03-23 RX ADMIN — CETIRIZINE HYDROCHLORIDE 10 MG: 5 TABLET ORAL at 08:03

## 2022-03-23 RX ADMIN — APIXABAN 5 MG: 2.5 TABLET, FILM COATED ORAL at 09:03

## 2022-03-23 RX ADMIN — SUCRALFATE 1 G: 1 TABLET ORAL at 09:03

## 2022-03-23 RX ADMIN — INSULIN ASPART 7 UNITS: 100 INJECTION, SOLUTION INTRAVENOUS; SUBCUTANEOUS at 05:03

## 2022-03-23 RX ADMIN — CLOPIDOGREL 75 MG: 75 TABLET, FILM COATED ORAL at 09:03

## 2022-03-23 RX ADMIN — ASPIRIN 81 MG: 81 TABLET, COATED ORAL at 09:03

## 2022-03-23 RX ADMIN — BACITRACIN ZINC, NEOMYCIN SULFATE, AND POLYMYXIN B SULFATE: 400; 3.5; 5 OINTMENT TOPICAL at 08:03

## 2022-03-23 RX ADMIN — INSULIN ASPART 7 UNITS: 100 INJECTION, SOLUTION INTRAVENOUS; SUBCUTANEOUS at 12:03

## 2022-03-23 RX ADMIN — AMIODARONE HYDROCHLORIDE 200 MG: 200 TABLET ORAL at 09:03

## 2022-03-23 RX ADMIN — OXYCODONE 5 MG: 5 TABLET ORAL at 07:03

## 2022-03-23 RX ADMIN — Medication 500 MG: at 08:03

## 2022-03-23 RX ADMIN — SENNOSIDES AND DOCUSATE SODIUM 1 TABLET: 50; 8.6 TABLET ORAL at 08:03

## 2022-03-23 RX ADMIN — SENNOSIDES AND DOCUSATE SODIUM 1 TABLET: 50; 8.6 TABLET ORAL at 09:03

## 2022-03-23 RX ADMIN — Medication 16 G: at 07:03

## 2022-03-23 RX ADMIN — INSULIN ASPART 2 UNITS: 100 INJECTION, SOLUTION INTRAVENOUS; SUBCUTANEOUS at 05:03

## 2022-03-23 RX ADMIN — SUCRALFATE 1 G: 1 TABLET ORAL at 12:03

## 2022-03-23 RX ADMIN — LOSARTAN POTASSIUM 25 MG: 25 TABLET, FILM COATED ORAL at 09:03

## 2022-03-23 RX ADMIN — Medication 400 MG: at 08:03

## 2022-03-23 RX ADMIN — Medication 400 MG: at 09:03

## 2022-03-23 RX ADMIN — DICLOFENAC SODIUM 4 G: 10 GEL TOPICAL at 09:03

## 2022-03-23 RX ADMIN — DICLOFENAC SODIUM 4 G: 10 GEL TOPICAL at 08:03

## 2022-03-23 RX ADMIN — PANTOPRAZOLE SODIUM 40 MG: 40 TABLET, DELAYED RELEASE ORAL at 09:03

## 2022-03-23 RX ADMIN — LACOSAMIDE 100 MG: 50 TABLET, FILM COATED ORAL at 08:03

## 2022-03-23 RX ADMIN — Medication 500 MG: at 09:03

## 2022-03-23 RX ADMIN — GABAPENTIN 200 MG: 100 CAPSULE ORAL at 02:03

## 2022-03-23 RX ADMIN — SUCRALFATE 1 G: 1 TABLET ORAL at 05:03

## 2022-03-23 RX ADMIN — APIXABAN 5 MG: 2.5 TABLET, FILM COATED ORAL at 08:03

## 2022-03-23 RX ADMIN — DICLOFENAC SODIUM 4 G: 10 GEL TOPICAL at 03:03

## 2022-03-23 NOTE — PLAN OF CARE
SW met with pt to confirm discharge. No DME needed. Patient's Daughter Basia Herrera, will be transporting pt to his home. Home Health has been referred to [facility] where patient is current.  D/C set for 2 PM.  Salina Sethi, [facility]  Case Management Department  [facility]  [email or web address]

## 2022-03-23 NOTE — PT/OT/SLP PROGRESS
"Physical Therapy Treatment    Patient Name:  Kamar Muñoz   MRN:  [id number]  Admit Date: 3/10/2022  Admitting Diagnosis: Encephalopathy, metabolic  Recent Surgeries:     General Precautions: Standard, fall   Orthopedic Precautions:N/A   Braces:       Recommendations:     Discharge Recommendations:  home health PT   Level of Assistance Recommended at Discharge: Intermittent assistance   Discharge Equipment Recommendations: bedside commode, walker, rolling, wheelchair   Barriers to discharge: Decreased caregiver support    Assessment:     Kamar Muñoz is a 76 y.o. male admitted with a medical diagnosis of Encephalopathy, metabolic . Pt tolerated well,  Continues to require some vcs for inc safety awareness with trfs and gait, pt would continue to benefit from skilled PT services to improve overall functional mobility, strength and endurance.  .      Performance deficits affecting function:  weakness, gait instability, impaired balance, impaired functional mobilty, decreased safety awareness, decreased coordination .    Rehab Potential is excellent    Activity Tolerance: Good    Plan:     Patient to be seen 6 x/week to address the above listed problems via gait training, therapeutic activities, therapeutic exercises, neuromuscular re-education, wheelchair management/training    · Plan of Care Expires: 04/10/22  · Plan of Care Reviewed with: patient    Subjective     "I'm ready now"     Pain/Comfort:  · Pain Rating 1: 0/10 (did not report)    Patient's cultural, spiritual, Congregation conflicts given the current situation:  · no    Objective:     Patient found right sidelying upon PT entry to room.     Therapeutic Activities and Exercises: 2x10 BLE AP, LAQ  Recumbent cross  x 11minutes    Functional Mobility:  · Bed Mobility:     · Supine to Sit: supervision no rail HOB elevated  · Sit to Supine: Post session  SBA/S for safety HOB elevated  · Transfers:    · Bed to Chair: stand by assistance with  no AD  " using  Stand Pivot and wc<>nustep close SBA stand pivot vcs for sequencing hand placement for safety  · Chair to bed: CGA for safety 2* to fatigue no AD using Stand Pivot  · Sit to Stand:  stand by assistance and contact guard assistance for safety with rolling walker  · Gait: amb with RW and shoes on 200'  CGA/close SBA with vcs to inc step width and prop tech for negotiating turns safely  · Stairs:  Pt ascended/descended 4 stair(s) with bilateral handrails with Contact Guard Assistance, LOBx1 with Minimal Assistance to recover likely due to fatigue from walk. vcs for safety getting entire foot on step    AM-PAC 6 CLICK MOBILITY  19    Patient left right sidelying with call button in reach and belongings in reach.    GOALS:   Multidisciplinary Problems     Physical Therapy Goals        Problem: Physical Therapy Goal    Goal Priority Disciplines Outcome Goal Variances Interventions   Physical Therapy Goal     PT, PT/OT Ongoing, Progressing     Description: Goals to be met by: 3/25/22    Patient will increase functional independence with mobility by performing:    . Supine to sit with [place]  . Sit to supine with [place]  . Rolling to Left and Right with [place].  . Sit to stand transfer with Supervision  . Bed to chair transfer with Supervision using No Assistive Device  . Gait  x 150 feet with Supervision using Rolling Walker.   . Wheelchair propulsion x150 feet with Supervision using bilateral uppper extremities  . Ascend/descend 4 stair with bilateral Handrails Contact Guard Assistance using No Assistive Device.   . Ascend/Descend 4 inch curb step with Contact Guard Assistance using Rolling Walker. - Met 03/19  . Retrieve object off floor in standing with RW and reacher and SBA.- Met 03/19                     Time Tracking:     PT Received On: 03/23/22  PT Total Time (min):       Billable Minutes: Gait Training 18, Therapeutic Activity 10 and Therapeutic Exercise 20    Treatment Type:  Treatment  PT/PTA: PTA     PTA Visit Number: 4 03/23/2022

## 2022-03-23 NOTE — PT/OT/SLP PROGRESS
Occupational Therapy   Treatment    Name: Kamar Muñoz  MRN: [id number]  Admit Date: 3/10/2022  Admitting Diagnosis:  Encephalopathy, metabolic    General Precautions: Standard, fall   Orthopedic Precautions:N/A   Braces:       Recommendations:     Discharge Recommendations: home health OT  Level of Assistance Recommended at Discharge: Intermittent assistance for ADL's and homemaking tasks  Discharge Equipment Recommendations:  bedside commode, walker, rolling, wheelchair (W/C with 18x16 seat, swing away desk length arm rests and swing away fot rests with heel loops.)  Barriers to discharge:  Decreased caregiver support    Assessment:     Kamar Muñoz is a 76 y.o. male with a medical diagnosis of Encephalopathy, metabolic   He presents with  Performance deficits affecting function are weakness, impaired endurance, impaired self care skills, impaired functional mobilty, gait instability, impaired balance, decreased coordination, decreased upper extremity function, pain, decreased lower extremity function, impaired cardiopulmonary response to activity.     Pt. Was cooperative and participated well with session on this day. Pt  continues to demonstrate levels of physical deficits with  functional indep with daily management activities tasks, selfcare skills with balance,  functional mobility, UB strength and endurance. Pt. Will continue to benefit from continued OT to progress towards goals     Rehab Potential is fair    Activity tolerance:  Fair    Plan:     Patient to be seen 6 x/week to address the above listed problems via self-care/home management, therapeutic activities, therapeutic exercises    · Plan of Care Expires: 04/11/22  · Plan of Care Reviewed with: patient    Subjective     Communicated with: nsg and Pt. prior to session. I am leaving on the 25th    Pain/Comfort:  Pain Rating 1: 0/10  Pain Rating Post-Intervention 1: 0/10    Patient's cultural, spiritual, Islam conflicts given the current  situation:  no    Objective:     Patient found up in chair   upon OT entry to room.    Bed Mobility:    · Patient completed Sit to Supine with minimum assistance for BLE management      Functional Mobility/Transfers:  · Patient completed Sit <> Stand Transfer with contact guard assistance  with  rolling walker   · Patient completed Bed <> Chair Transfer using Stand Pivot technique with contact guard assistance with rolling walker    Activities of Daily Living:  · Upper Body dressing: stand by assistance to pema/doff pull over shirt  · Feeding: Modified independence with remainder of lunch  · Lower body dressing to pema/doff pants seated and RW for balance and SBA for BLE socks  l  [facility] 6 Click ADL: 17    OT Exercises: UE Ergometer 15 min with break inbetween    Treatment & Education:   Pt. With 4#dumb belll activity with 2x15 reps with shd flex, bicep curls horz adb/add and forward flex motion to increase BUE ROM and strength.    Pt. With therex performed to increase ROM, endurance selfcare task and fxl mobility for independence     Pt edu on role of OT, POC, safety when performing self care tasks , benefit of performing OOB activity, and safety when performing functional transfers and mobility management for preparation with goals to progress towards next level of care    Patient left supine with all lines intact and call button in reachEducation:      GOALS:   Multidisciplinary Problems     Occupational Therapy Goals        Problem: Occupational Therapy Goal    Goal Priority Disciplines Outcome Interventions   Occupational Therapy Goal     OT, PT/OT Ongoing, Progressing    Description: Goals to be met by: 3/31/22     Patient will increase functional independence with ADLs by performing:    UE Dressing with Modified [place].  LE Dressing with Set-up Assistance.  Grooming while seated at sink with Modified [place]. Ongoing  Toileting from toilet or BSC with Supervision for hygiene and clothing management.    Bathing from sitting on shower seat with Supervision.  Supine to sit with Modified [place].  Stand pivot transfers with Modified [place] using A/D as needed..  Toilet transfer to toilet or BSC with Modified [place] using A/D as needed  Upper extremity exercise using UBE with mod resistance for 10 minutes to increased functional   endurance to perform functional tasks and mobility.     Ongoing  Caregiver will be educated on level of assist required to safely perform self care tasks and functional transfers..                     Time Tracking:     OT Date of Treatment: OT Date of Treatment: 03/23/22  OT Total Time (min):      Billable Minutes:Self Care/Home Management 28  Therapeutic Exercise 25    3/23/2022

## 2022-03-23 NOTE — PLAN OF CARE
[facility] Skilled Nursing      HOME HEALTH ORDERS  FACE TO FACE ENCOUNTER    Patient Name: Kamar Muñoz  YOB: 1945    PCP: Basim Guerrero MD   PCP Address: [address] / [place] [address]  PCP Phone Number: [phone]  PCP Fax: [phone]    Encounter Date: 3/10/22    Admit to Home Health    Diagnoses:  Active Hospital Problems    Diagnosis  POA    *Encephalopathy, metabolic [G93.41]  Yes    Malnutrition of moderate degree [E44.0]  Yes    Coronary artery disease involving native heart without angina pectoris [I25.10]  Yes    Paroxysmal atrial fibrillation [I48.0]  Yes     Chronic    Embolic stroke involving right middle cerebral artery [I63.411]  Yes     Chronic    Type 2 diabetes mellitus with diabetic peripheral angiopathy without gangrene, with long-term current use of insulin [E11.51, Z79.4]  Not Applicable    Chronic pulmonary heart disease [I27.9]  Yes    Centrilobular emphysema [J43.2]  Yes     Hyperinflated cxr.  Emphysematous changes on lung cuts on ct abd.  Clinically asymptomatic aside from cough.  Baseline pft.  Prn albuterol.       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       Follow Up Appointments:  Future Appointments   Date Time Provider Department Center   3/24/2022  7:45 AM [facility] SPEC LAB [facility] SPECL[facility]   4/6/2022  1:00 PM Basim Guerrero MD [facility]       Allergies:  Review of patient's allergies indicates:   Allergen Reactions    Iodine      Other reaction(s): swelling  Other reaction(s): Itching  Other reaction(s): Rash       Medications: Review discharge medications with patient and family and  provide education.      I have seen and examined this patient within the last 30 days. My clinical findings that support the need for the home health skilled services and home bound status are the following:no   Weakness/numbness causing balance and gait disturbance due to Infection making it taxing to leave home.     Referrals/ Consults  Physical Therapy to evaluate and treat. Evaluate for home safety and equipment needs; Establish/upgrade home exercise program. Perform / instruct on therapeutic exercises, gait training, transfer training, and Range of Motion.  Occupational Therapy to evaluate and treat. Evaluate home environment for safety and equipment needs. Perform/Instruct on transfers, ADL training, ROM, and therapeutic exercises.   to evaluate for community resources/long-range planning.  Aide to provide assistance with personal care, ADLs, and vital signs.    Activities:   activity as tolerated    Nursing:   Agency to admit patient within 24 hours of hospital discharge unless specified on physician order or at patient request    SN to complete comprehensive assessment including routine vital signs. Instruct on disease process and s/s of complications to report to MD. Review/verify medication list sent home with the patient at time of discharge  and instruct patient/caregiver as needed. Frequency may be adjusted depending on start of care date.     Skilled nurse to perform up to 3 visits PRN for symptoms related to diagnosis    Notify MD if SBP > 160 or < 90; DBP > 90 or < 50; HR > 120 or < 50; Temp > 101; O2 < 88%    Ok to schedule additional visits based on staff availability and patient request on consecutive days within the home health episode.    Miscellaneous   Diabetic Care:   SN to perform and educate Diabetic management with blood glucose monitoring:, Fingerstick blood sugar AC and HS and Report CBG < 60 or > 350 to physician.    Home Health Aide:  Nursing Three times weekly, Physical  Therapy Three times weekly, Occupational Therapy Three times weekly, Medical Social Work Three times weekly and Home Health Aide Three times weekly    Wound Care Orders  Buttocks/gluteal-cleanse with sea clens wound cleanser, pat dry, apply triple antibiotic ointment to the wound beds then cover with mepore dressing-  triad ointment to serina-wound skin  BID      I certify that this patient is confined to his home and needs intermittent skilled nursing care, physical therapy and occupational therapy.

## 2022-03-23 NOTE — PROGRESS NOTES
[facility]                                  Skilled Nursing Facility                   Progress Note     Admit Date: 3/10/2022  ALLIE 3/25/2022  Principal Problem:  Encephalopathy, metabolic   HPI obtained from patient interview and chart review     Chief Complaint: hyperglycemia with hypoglycemic event this morning    HPI:   Kamar Muñoz is a 76 year old male with PMHx of CAD s/p 2 LAUREN in RCA (Jan 2022), HTN, HLD, p. A. 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  Admission to SNF for secondary weakness debility, continued insulin adjustments     Interval history:  Patient's blood glucose levels continue to be difficult to control.  Twenty-four range is .  Patient noted to have blood glucose levels 60 this morning, he was asymptomatic, he was given glucose tablets and it improved to 105. I have made further adjustments to his diabetic regimen to mimic what he takes at home.    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    Past Surgical History: Patient has a past surgical history that includes Back surgery; Eye surgery; Shoulder open rotator cuff repair; renal stones; Hernia repair; Colonoscopy (N/A, 1/28/2019); Cataract extraction w/  intraocular lens implant (Right); and Left heart catheterization (Left, 1/9/2022).    Social History: Patient reports that he quit smoking about 39 years ago. He has a 37.50 pack-year  smoking history. He has never used smokeless tobacco. He reports that he does not drink alcohol and does not use drugs.    Family History:  No significant family history to report.    Allergies: Patient is allergic to iodine.    ROS  Constitutional: Negative for fever.  +appetite change   Eyes: Negative for blurred vision, double vision   Respiratory: Negative for shortness of breath.  +mild productive cough, nasal congestion   Cardiovascular: Negative for chest pain, palpitations, and leg swelling.   Gastrointestinal: Negative for abdominal pain, constipation, diarrhea, nausea, vomiting.   Genitourinary: Negative for dysuria, frequency   Musculoskeletal:  + generalized weakness. Negative for back pain and myalgias.   Skin: Negative for itching and rash.   Neurological: Negative for dizziness, headaches.   Psychiatric/Behavioral: Negative for depression. The patient is not nervous/anxious.      24 hour Vital Sign Range   Temp:  [97.1 °F (36.2 °C)-97.8 °F (36.6 °C)]   Pulse:  [78-85]   Resp:  [17-18]   BP: (119-123)/(56-68)   SpO2:  [95 %]     PEx  Constitutional: Patient appears debilitated.  No distress noted  HENT:   Head: Normocephalic and atraumatic.   Eyes: Pupils are equal, round  Neck: Normal range of motion. Neck supple.   Cardiovascular: Normal rate, regular rhythm and normal heart sounds.    Pulmonary/Chest: Effort normal and breath sounds are clear  Abdominal: Soft. Bowel sounds are normal.   Musculoskeletal: Normal range of motion.   Neurological: Alert and oriented to person, place, and time.   Psychiatric: Normal mood and affect. Behavior is normal.   Skin: Skin is warm and dry.     WOUND  Date identified - POA- 3/10/2022  Location:  Sacrum and left buttocks  Type:  Partial thickness tissue loss  Stage (if pressure): II  Appearance:  Red, moist  0.7cm x 0.7cm x 0.2cm   Undermining/tunneling: No   Drainage:  None  Odor: none   Edges: Irregular edges  Periwound:  Ecchymotic/excoriated  Progress:    decreased in size    No results for input(s): GLUCOSE, NA, K, CL, CO2, BUN, CREATININE, MG in the last 24 hours.    Invalid input(s):  CALCIUM    No results for input(s): WBC, RBC, HGB, HCT, PLT, MCV, MCH, MCHC in the last 24 hours.      Assessment and Plan:       Type 2 diabetes mellitus with hyperglycemia, with long-term current use of insulin  - Per [facility], Patient with uncontrolled DM presenting with metabolic encephalopathy in the setting of DKA with coma. Suspect patient developed DKA in the setting of sepsis/ COVID-19- DKA protocol completed and DKA resolved in MICU and Pt stepped down on subq insulin.  Endocrine followed   - Diabetic diet, Accu-Cheks AC/HS  - home regimen with Levemir 20 units daily, NovoLog 9 units TID WM, metformin 1000 mg BID  - 3/23 initiated detemir 12 units daily, aspart 7 units TID WM, metformin 1000 mg BID    Nasal congestion  - continue Zyrtec 10 mg qHS., Flonase daily    COVID-19 virus infection  Viral Pneumonia due to COVID-19  - COVID vaccinated with booster.   - Received 1 dose of sotrovimab infusion 02/18/2022  - Completed 5 days of remdesivir, had dexamethasone held initially due to DKA.  - Stable on room air  - End isolation on 3/9/2022  - continues to have mild productive cough   - initiated PRN DuoNebs     Hypertension associated with diabetes  - home regimen with losartan 25mg, Toprol xl 50mg, diltiazem 120mg at home  - continue losartan 25mg daily     Paroxysmal atrial fibrillation  - home Metoprolol XL 50 mg daily, diltiazem  mg daily and amiodarone 200 mg daily, apixaban 5mg BID  - continue amiodarone 20 mg daily, apixaban 5 mg BID. Holding metoprolol due to hypotension/bradycardia, holding diltiazem due to hypotension     Coronary artery disease involving native coronary artery of native heart with unstable angina pectoris  HLD  - Hx of CAD s/p placement of 2 LAUREN in RCA in 01/09/2022.   - Continue home ASA, Plavix and statin  - Chest pain 3/4 after breakfast; EKG  nonischemic. PPI, Tums, carafate added     Pulmonary nodules  - Has stable bilateral pulmonary nodules/masses with broad differential per outpatient pulmonology notes. No pathology report as none of the nodules appeared to be safe for biopsy given high risk of PTX is high with many adjacent bulla. Plan working on promoting functional independence for the time being with possible addressing of nodules in the future, which is highly dependent upon his overall functionality.     Chronic pulmonary heart disease  - Follows up with Pulmonary clinic in [place]. Last seen in December and was evaluated for pulmonary nodules. Deemed to have mild COPD per notes. Not on any maintenance therapy.  - Continue albuterol inhaler   - likely needs follow-up with Pulmonary after discharge from [facility]     Embolic stroke involving right middle cerebral artery  - Hx of CVA in Jan 2022.   - CT Head with evolving infarcts in right frontal and left cerebellar hemispheres.   - Has had issues with memory per family since CVA   - No concern for acute stroke this admit per discussion with Radiology.   - Continue home antiplatelets, statin and Eliquis     Focal seizures  - Continue home lancosamide      Pressure injury of buttock, unstageable  - Seen by wound care with Triad started  - Continues to cause patient discomfort  - initiate wound care consult at [facility]     Vitamin-D deficiency  - continue ergocalciferol 1000 units weekly     Hypomagnesemia  - magnesium oxide 400 mg BID      Peripheral neuropathy  - continue gabapentin 200 mg TID     Malnutrition of moderate degree  - RD following, appreciate recommendations, continue supplements as ordered     Debility   - Continue with PT/OT for gait training and strengthening and restoration of ADL's   - Encourage mobility, OOB in chair, and early ambulation as appropriate  - Fall precautions   - Monitor for bowel and bladder dysfunction  - Monitor for and prevent skin breakdown and pressure ulcers  -  Continue DVT prophylaxis with apixaban        Anticipate disposition:  Home with home health        Follow-up needed during SNF stay-     Appointment not to send patient to- Dr. Kumar (3/21)     Follow-up needed after discharge from SNF:      - PCP - 4/6  - pulmonary- needs to be scheduled- post COVID/COPD   - Endocrinology- needs to be scheduled- diabetes mellitus type 2       Future Appointments   Date Time Provider Department Center   3/24/2022  7:45 AM COVID NURSING [place] SPEC LAB [facility] SPECLAB [facility]   4/6/2022  1:00 PM Basim Guerrero MD [facility]       Jazmín Zambrano NP  Department of Hospital Medicine   [facility]     DOS: 3/23/2022       Patient note was created using MModal Dictation.  Any errors in syntax or even information may not have been identified and edited on initial review prior to signing this note.

## 2022-03-23 NOTE — PROGRESS NOTES
Patient noted was standing in front of the wheel chair, and wheels on w/c were not locked. Patient instructed on fall precautions, instructed on risks of injury when not asking for assistance, patient verbalized understanding, call light withing reach. Pt teaching given on how to lock wheels on w/c. Will monitor.

## 2022-03-24 LAB
ANION GAP SERPL CALC-SCNC: 9 MMOL/L (ref 8–16)
BASOPHILS # BLD AUTO: 0.06 K/UL (ref 0–0.2)
BASOPHILS NFR BLD: 0.9 % (ref 0–1.9)
BUN SERPL-MCNC: 18 MG/DL (ref 8–23)
CALCIUM SERPL-MCNC: 8.8 MG/DL (ref 8.7–10.5)
CHLORIDE SERPL-SCNC: 100 MMOL/L (ref 95–110)
CO2 SERPL-SCNC: 26 MMOL/L (ref 23–29)
CREAT SERPL-MCNC: 1 MG/DL (ref 0.5–1.4)
DIFFERENTIAL METHOD: ABNORMAL
EOSINOPHIL # BLD AUTO: 0.4 K/UL (ref 0–0.5)
EOSINOPHIL NFR BLD: 6.1 % (ref 0–8)
ERYTHROCYTE [DISTWIDTH] IN BLOOD BY AUTOMATED COUNT: 17.3 % (ref 11.5–14.5)
EST. GFR  (AFRICAN AMERICAN): >60 ML/MIN/1.73 M^2
EST. GFR  (NON AFRICAN AMERICAN): >60 ML/MIN/1.73 M^2
GLUCOSE SERPL-MCNC: 217 MG/DL (ref 70–110)
HCT VFR BLD AUTO: 34.9 % (ref 40–54)
HGB BLD-MCNC: 11.3 G/DL (ref 14–18)
IMM GRANULOCYTES # BLD AUTO: 0.01 K/UL (ref 0–0.04)
IMM GRANULOCYTES NFR BLD AUTO: 0.1 % (ref 0–0.5)
LYMPHOCYTES # BLD AUTO: 2 K/UL (ref 1–4.8)
LYMPHOCYTES NFR BLD: 30 % (ref 18–48)
MAGNESIUM SERPL-MCNC: 1.6 MG/DL (ref 1.6–2.6)
MCH RBC QN AUTO: 28.8 PG (ref 27–31)
MCHC RBC AUTO-ENTMCNC: 32.4 G/DL (ref 32–36)
MCV RBC AUTO: 89 FL (ref 82–98)
MONOCYTES # BLD AUTO: 0.8 K/UL (ref 0.3–1)
MONOCYTES NFR BLD: 11.2 % (ref 4–15)
NEUTROPHILS # BLD AUTO: 3.5 K/UL (ref 1.8–7.7)
NEUTROPHILS NFR BLD: 51.7 % (ref 38–73)
NRBC BLD-RTO: 0 /100 WBC
PHOSPHATE SERPL-MCNC: 2.9 MG/DL (ref 2.7–4.5)
PLATELET # BLD AUTO: 249 K/UL (ref 150–450)
PMV BLD AUTO: 9.9 FL (ref 9.2–12.9)
POCT GLUCOSE: 220 MG/DL (ref 70–110)
POCT GLUCOSE: 271 MG/DL (ref 70–110)
POCT GLUCOSE: 393 MG/DL (ref 70–110)
POCT GLUCOSE: 45 MG/DL (ref 70–110)
POCT GLUCOSE: 93 MG/DL (ref 70–110)
POTASSIUM SERPL-SCNC: 4.6 MMOL/L (ref 3.5–5.1)
RBC # BLD AUTO: 3.93 M/UL (ref 4.6–6.2)
SODIUM SERPL-SCNC: 135 MMOL/L (ref 136–145)
WBC # BLD AUTO: 6.69 K/UL (ref 3.9–12.7)

## 2022-03-24 PROCEDURE — 11000004 HC SNF PRIVATE

## 2022-03-24 PROCEDURE — 83735 ASSAY OF MAGNESIUM: CPT | Performed by: HOSPITALIST

## 2022-03-24 PROCEDURE — 25000003 PHARM REV CODE 250: Performed by: HOSPITALIST

## 2022-03-24 PROCEDURE — 85025 COMPLETE CBC W/AUTO DIFF WBC: CPT | Performed by: HOSPITALIST

## 2022-03-24 PROCEDURE — 97110 THERAPEUTIC EXERCISES: CPT

## 2022-03-24 PROCEDURE — 97116 GAIT TRAINING THERAPY: CPT

## 2022-03-24 PROCEDURE — 36415 COLL VENOUS BLD VENIPUNCTURE: CPT | Performed by: HOSPITALIST

## 2022-03-24 PROCEDURE — 97535 SELF CARE MNGMENT TRAINING: CPT | Mod: CO

## 2022-03-24 PROCEDURE — 25000003 PHARM REV CODE 250: Performed by: NURSE PRACTITIONER

## 2022-03-24 PROCEDURE — 84100 ASSAY OF PHOSPHORUS: CPT | Performed by: HOSPITALIST

## 2022-03-24 PROCEDURE — 80048 BASIC METABOLIC PNL TOTAL CA: CPT | Performed by: HOSPITALIST

## 2022-03-24 RX ORDER — INSULIN ASPART 100 [IU]/ML
9 INJECTION, SOLUTION INTRAVENOUS; SUBCUTANEOUS 3 TIMES DAILY
Qty: 9 ML | Refills: 3 | Status: SHIPPED | OUTPATIENT
Start: 2022-03-24 | End: 2022-03-31 | Stop reason: SDUPTHER

## 2022-03-24 RX ORDER — OXYCODONE HYDROCHLORIDE 5 MG/1
5 TABLET ORAL EVERY 6 HOURS PRN
Qty: 20 TABLET | Refills: 0 | Status: ON HOLD | OUTPATIENT
Start: 2022-03-24 | End: 2022-04-07 | Stop reason: HOSPADM

## 2022-03-24 RX ORDER — METFORMIN HYDROCHLORIDE 1000 MG/1
1000 TABLET ORAL 2 TIMES DAILY WITH MEALS
Qty: 60 TABLET | Refills: 3 | Status: SHIPPED | OUTPATIENT
Start: 2022-03-24 | End: 2022-05-03

## 2022-03-24 RX ORDER — METOPROLOL SUCCINATE 50 MG/1
50 TABLET, EXTENDED RELEASE ORAL DAILY
Qty: 30 TABLET | Refills: 3 | Status: ON HOLD | OUTPATIENT
Start: 2022-03-24 | End: 2022-10-10 | Stop reason: SDUPTHER

## 2022-03-24 RX ORDER — CETIRIZINE HYDROCHLORIDE 10 MG/1
10 TABLET ORAL NIGHTLY
Refills: 0 | Status: ON HOLD | COMMUNITY
Start: 2022-03-24 | End: 2023-03-01 | Stop reason: HOSPADM

## 2022-03-24 RX ORDER — GABAPENTIN 100 MG/1
200 CAPSULE ORAL 3 TIMES DAILY
Qty: 180 CAPSULE | Refills: 3 | Status: SHIPPED | OUTPATIENT
Start: 2022-03-24 | End: 2022-05-03

## 2022-03-24 RX ORDER — SUCRALFATE 1 G/1
1 TABLET ORAL
Qty: 90 TABLET | Refills: 3 | Status: SHIPPED | OUTPATIENT
Start: 2022-03-24 | End: 2022-09-02

## 2022-03-24 RX ORDER — INSULIN ASPART 100 [IU]/ML
9 INJECTION, SOLUTION INTRAVENOUS; SUBCUTANEOUS
Status: DISCONTINUED | OUTPATIENT
Start: 2022-03-24 | End: 2022-03-28

## 2022-03-24 RX ORDER — INSULIN GLARGINE 100 [IU]/ML
20 INJECTION, SOLUTION SUBCUTANEOUS NIGHTLY
Qty: 6 ML | Refills: 3 | Status: CANCELLED | OUTPATIENT
Start: 2022-03-24

## 2022-03-24 RX ORDER — INSULIN GLARGINE 100 [IU]/ML
12 INJECTION, SOLUTION SUBCUTANEOUS DAILY
Qty: 10.8 ML | Refills: 3 | Status: ON HOLD | OUTPATIENT
Start: 2022-03-24 | End: 2022-04-10 | Stop reason: SDUPTHER

## 2022-03-24 RX ADMIN — CALCIUM CARBONATE (ANTACID) CHEW TAB 500 MG 500 MG: 500 CHEW TAB at 01:03

## 2022-03-24 RX ADMIN — METFORMIN HYDROCHLORIDE 1000 MG: 500 TABLET ORAL at 08:03

## 2022-03-24 RX ADMIN — BACITRACIN ZINC, NEOMYCIN SULFATE, AND POLYMYXIN B SULFATE: 400; 3.5; 5 OINTMENT TOPICAL at 10:03

## 2022-03-24 RX ADMIN — AMIODARONE HYDROCHLORIDE 200 MG: 200 TABLET ORAL at 08:03

## 2022-03-24 RX ADMIN — INSULIN ASPART 3 UNITS: 100 INJECTION, SOLUTION INTRAVENOUS; SUBCUTANEOUS at 08:03

## 2022-03-24 RX ADMIN — GABAPENTIN 200 MG: 100 CAPSULE ORAL at 03:03

## 2022-03-24 RX ADMIN — INSULIN ASPART 7 UNITS: 100 INJECTION, SOLUTION INTRAVENOUS; SUBCUTANEOUS at 07:03

## 2022-03-24 RX ADMIN — INSULIN ASPART 9 UNITS: 100 INJECTION, SOLUTION INTRAVENOUS; SUBCUTANEOUS at 05:03

## 2022-03-24 RX ADMIN — Medication 16 G: at 11:03

## 2022-03-24 RX ADMIN — CLOPIDOGREL 75 MG: 75 TABLET, FILM COATED ORAL at 08:03

## 2022-03-24 RX ADMIN — SUCRALFATE 1 G: 1 TABLET ORAL at 05:03

## 2022-03-24 RX ADMIN — SUCRALFATE 1 G: 1 TABLET ORAL at 12:03

## 2022-03-24 RX ADMIN — SENNOSIDES AND DOCUSATE SODIUM 1 TABLET: 50; 8.6 TABLET ORAL at 10:03

## 2022-03-24 RX ADMIN — DICLOFENAC SODIUM 4 G: 10 GEL TOPICAL at 03:03

## 2022-03-24 RX ADMIN — DICLOFENAC SODIUM 4 G: 10 GEL TOPICAL at 10:03

## 2022-03-24 RX ADMIN — LACOSAMIDE 100 MG: 50 TABLET, FILM COATED ORAL at 08:03

## 2022-03-24 RX ADMIN — ATORVASTATIN CALCIUM 40 MG: 20 TABLET, FILM COATED ORAL at 08:03

## 2022-03-24 RX ADMIN — INSULIN ASPART 2 UNITS: 100 INJECTION, SOLUTION INTRAVENOUS; SUBCUTANEOUS at 05:03

## 2022-03-24 RX ADMIN — GABAPENTIN 200 MG: 100 CAPSULE ORAL at 08:03

## 2022-03-24 RX ADMIN — LOSARTAN POTASSIUM 25 MG: 25 TABLET, FILM COATED ORAL at 08:03

## 2022-03-24 RX ADMIN — OXYCODONE 5 MG: 5 TABLET ORAL at 10:03

## 2022-03-24 RX ADMIN — SUCRALFATE 1 G: 1 TABLET ORAL at 08:03

## 2022-03-24 RX ADMIN — Medication 400 MG: at 10:03

## 2022-03-24 RX ADMIN — APIXABAN 5 MG: 2.5 TABLET, FILM COATED ORAL at 10:03

## 2022-03-24 RX ADMIN — METFORMIN HYDROCHLORIDE 1000 MG: 500 TABLET ORAL at 05:03

## 2022-03-24 RX ADMIN — INSULIN ASPART 5 UNITS: 100 INJECTION, SOLUTION INTRAVENOUS; SUBCUTANEOUS at 11:03

## 2022-03-24 RX ADMIN — CETIRIZINE HYDROCHLORIDE 10 MG: 5 TABLET ORAL at 10:03

## 2022-03-24 RX ADMIN — Medication 500 MG: at 10:03

## 2022-03-24 RX ADMIN — APIXABAN 5 MG: 2.5 TABLET, FILM COATED ORAL at 08:03

## 2022-03-24 RX ADMIN — LACOSAMIDE 100 MG: 50 TABLET, FILM COATED ORAL at 10:03

## 2022-03-24 RX ADMIN — Medication 400 MG: at 08:03

## 2022-03-24 RX ADMIN — GABAPENTIN 200 MG: 100 CAPSULE ORAL at 10:03

## 2022-03-24 RX ADMIN — INSULIN ASPART 7 UNITS: 100 INJECTION, SOLUTION INTRAVENOUS; SUBCUTANEOUS at 11:03

## 2022-03-24 RX ADMIN — SENNOSIDES AND DOCUSATE SODIUM 1 TABLET: 50; 8.6 TABLET ORAL at 08:03

## 2022-03-24 RX ADMIN — Medication 500 MG: at 08:03

## 2022-03-24 RX ADMIN — THERA TABS 1 TABLET: TAB at 08:03

## 2022-03-24 RX ADMIN — ASPIRIN 81 MG: 81 TABLET, COATED ORAL at 08:03

## 2022-03-24 RX ADMIN — PANTOPRAZOLE SODIUM 40 MG: 40 TABLET, DELAYED RELEASE ORAL at 08:03

## 2022-03-24 NOTE — PT/OT/SLP PROGRESS
Occupational Therapy   Treatment    Name: Kamar Muñoz  MRN: [id number]  Admit Date: 3/10/2022  Admitting Diagnosis:  Encephalopathy, metabolic    General Precautions: Standard, fall   Orthopedic Precautions:N/A   Braces:       Recommendations:     Discharge Recommendations: home health OT  Level of Assistance Recommended at Discharge: 24 hours supervision for safety in performing ADL's and home management tasks and Intermittent assistance for ADL's and homemaking tasks  Discharge Equipment Recommendations:  bedside commode, walker, rolling, wheelchair (W/C with 18x16 seat, swing away desk length arm rests and swing away fot rests with heel loops.)  Barriers to discharge:  Decreased caregiver support    Assessment:     Kamar Muñoz is a 76 y.o. male with a medical diagnosis of Encephalopathy, metabolic.  He presents with  Performance deficits affecting function are weakness, impaired endurance, impaired self care skills, impaired functional mobilty, gait instability, impaired balance, decreased coordination, decreased upper extremity function, pain, decreased lower extremity function, impaired cardiopulmonary response to activity.     Pt. Was cooperative and participated well with session on this day. Pt  continues to demonstrate levels of physical deficits with  functional indep with daily management activities tasks, selfcare skills with balance,  functional mobility, UB strength and endurance. Pt. Will continue to benefit from continued OT to progress towards goals     Rehab Potential is fair    Activity tolerance:  Fair    Plan:     Patient to be seen 6 x/week to address the above listed problems via self-care/home management, therapeutic activities, therapeutic exercises    · Plan of Care Expires: 04/11/22  · Plan of Care Reviewed with: patient    Subjective     Communicated with: nsg and Pt. prior to session I am doing well I am leaving tomorrow     Pain/Comfort:  · Pain Rating 1: 0/10  · Pain Rating  Post-Intervention 1: 0/10    Patient's cultural, spiritual, Synagogue conflicts given the current situation:  · no    Objective:     Patient found up in chair    upon OT entry to room.    Bed Mobility:    · Patient completed Sit to Supine with stand by assistance     Functional Mobility/Transfers:  · Patient completed Sit <> Stand Transfer with stand by assistance and contact guard assistance  with  rolling walker   · Patient completed Bed <> Chair Transfer using Stand Pivot technique with stand by assistance with rolling walker  · Patient completed Toilet Transfer Stand Pivot technique with stand by assistance with  grab bars  · Patient completed  Shower Transfer Stand Pivot technique with contact guard assistance with grab bars     Activities of Daily Living:  · Feeding:  modified independence with eating breakfast   · Grooming: modified independence at sink level with grooming needs  · Bathing: stand by assistance in shower seated  · Upper Body Dressing: stand by assistance to doff/pema pull over shirt  · Lower Body Dressing: minimum assistance to pema pants seated and to manage over hips instance with RW for bal and MIn A for BLE socks  · Toileting: stand by assistance with cleaning and clothing management     [facility] 6 Click ADL: 17    Treatment & Education:  Pt edu on role of OT, POC, safety when performing self care tasks , benefit of performing OOB activity, and safety when performing functional transfers and mobility management for preparation with goals to progress towards next level of care    Patient left up in chair with all lines intact and call button in reachEducation:      GOALS:   Multidisciplinary Problems     Occupational Therapy Goals        Problem: Occupational Therapy Goal    Goal Priority Disciplines Outcome Interventions   Occupational Therapy Goal     OT, PT/OT Ongoing, Progressing    Description: Goals to be met by: 3/31/22     Patient will increase functional independence with ADLs by  performing:    UE Dressing with Modified Harris.-Not MET  LE Dressing with Set-up Assistance.Not MET  Grooming while seated at sink with Modified [place]. MET  Toileting from toilet or BSC with Supervision for hygiene and clothing management. -MET  Bathing from sitting on shower seat with Supervision.MET  Supine to sit with Modified [place].MET  Stand pivot transfers with Modified Harris using A/D as needed..-Not MET  Toilet transfer to toilet or BSC with Modified Harris using A/D as needed-Not MET  Upper extremity exercise using UBE with mod resistance for 10 minutes to increased functional   endurance to perform functional tasks and mobility.     Ongoing-MET  Caregiver will be educated on level of assist required to safely perform self care tasks and functional transfers..Family no show                   Time Tracking:     OT Date of Treatment: OT Date of Treatment: 03/24/22  OT Total Time (min):      Billable Minutes:Self Care/Home Management 54    3/24/2022  A client care conference was performed between the HEIDY and GAURAV, prior to treatment to discuss the patient's status, treatment plan and established goals.

## 2022-03-24 NOTE — PROGRESS NOTES
[facility]                                  Skilled Nursing Facility                   Progress Note     Admit Date: 3/10/2022  ALLIE 3/25/2022  Principal Problem:  Encephalopathy, metabolic   HPI obtained from patient interview and chart review     Chief Complaint: Re-evaluation of medical treatment and therapy status: Lab review, hyperglycemia    HPI:   Kamar Muñoz is a 76 year old male with PMHx of CAD s/p 2 LAUREN in RCA (Jan 2022), HTN, HLD, p. A. 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  Admission to SNF for secondary weakness debility, continued insulin adjustments     Interval history:  All of today's labs reviewed and are listed below.  24 hr vital sign ranges listed below.  24 hour blood glucose range is 155-281.  Blood glucoses have been very labile at SNF extremely difficult to control.  Referral placed for endocrinology.  Patient denies shortness of breath, abdominal discomfort, nausea, or vomiting.  Patient reports an adequate appetite.  Patient denies dysuria.  Patient reports having regular bowel movements.  Patient progessing with PT/OT- Gait: amb with RW and shoes on 200'  CGA/close SBA with vcs to inc step width and prop tech for negotiating turns safely. Continuing to follow and treat all acute and chronic conditions.    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    Past Surgical History: Patient has a past  surgical history that includes Back surgery; Eye surgery; Shoulder open rotator cuff repair; renal stones; Hernia repair; Colonoscopy (N/A, 1/28/2019); Cataract extraction w/  intraocular lens implant (Right); and Left heart catheterization (Left, 1/9/2022).    Social History: Patient reports that he quit smoking about 39 years ago. He has a 37.50 pack-year smoking history. He has never used smokeless tobacco. He reports that he does not drink alcohol and does not use drugs.    Family History:  No significant family history to report.    Allergies: Patient is allergic to iodine.    ROS  Constitutional: Negative for fever.  +appetite change   Eyes: Negative for blurred vision, double vision   Respiratory: Negative for shortness of breath, cough  Cardiovascular: Negative for chest pain, palpitations, and leg swelling.   Gastrointestinal: Negative for abdominal pain, constipation, diarrhea, nausea, vomiting.   Genitourinary: Negative for dysuria, frequency   Musculoskeletal:  + generalized weakness. Negative for back pain and myalgias.   Skin: Negative for itching and rash.   Neurological: Negative for dizziness, headaches.   Psychiatric/Behavioral: Negative for depression. The patient is not nervous/anxious.      24 hour Vital Sign Range   Temp:  [97.2 °F (36.2 °C)-97.6 °F (36.4 °C)]   Pulse:  [94-95]   Resp:  [16-17]   BP: (118-151)/(57-81)   SpO2:  [94 %-95 %]     PEx  Constitutional: Patient appears debilitated.  No distress noted  HENT:   Head: Normocephalic and atraumatic.   Eyes: Pupils are equal, round  Neck: Normal range of motion. Neck supple.   Cardiovascular: Normal rate, regular rhythm and normal heart sounds.    Pulmonary/Chest: Effort normal and breath sounds are clear  Abdominal: Soft. Bowel sounds are normal.   Musculoskeletal: Normal range of motion.   Neurological: Alert and oriented to person, place, and time.   Psychiatric: Normal mood and affect. Behavior is normal.   Skin: Skin is warm and  dry.  Wound that you did not assess today:  Wound location(s)/type(s):  Sacrum  Not visualized today. No signs/symptoms of infection or wound-related changes reported.         Recent Labs   Lab 03/24/22  0442   *   K 4.6      CO2 26   BUN 18   CREATININE 1.0   MG 1.6       Recent Labs   Lab 03/24/22  0442   WBC 6.69   RBC 3.93*   HGB 11.3*   HCT 34.9*      MCV 89   MCH 28.8   MCHC 32.4         Assessment and Plan:       Type 2 diabetes mellitus with hyperglycemia, with long-term current use of insulin  - Per [facility], Patient with uncontrolled DM presenting with metabolic encephalopathy in the setting of DKA with coma. Suspect patient developed DKA in the setting of sepsis/ COVID-19- DKA protocol completed and DKA resolved in MICU and Pt stepped down on subq insulin.  Endocrine followed   - Diabetic diet, Accu-Cheks AC/HS  - home regimen with Levemir 20 units daily, NovoLog 9 units TID WM, metformin 1000 mg BID  - 3/23 initiated aspart 9 units TID WM, continue detemir 12 units daily, metformin 1000 mg BID    Nasal congestion  - continue Zyrtec 10 mg qHS., Flonase daily    COVID-19 virus infection  Viral Pneumonia due to COVID-19  - COVID vaccinated with booster.   - Received 1 dose of sotrovimab infusion 02/18/2022  - Completed 5 days of remdesivir, had dexamethasone held initially due to DKA.  - Stable on room air  - End isolation on 3/9/2022  - continues to have mild productive cough   - PRN DuoNebs     Hypertension associated with diabetes  - home regimen with losartan 25mg, Toprol xl 50mg, diltiazem 120mg at home  - continue losartan 25mg daily     Paroxysmal atrial fibrillation  - home Metoprolol XL 50 mg daily, diltiazem  mg daily and amiodarone 200 mg daily, apixaban 5mg BID  - continue amiodarone 20 mg daily, apixaban 5 mg BID. Holding metoprolol due to hypotension/bradycardia, holding diltiazem due to hypotension     Coronary artery disease involving native coronary artery of native  heart with unstable angina pectoris  HLD  - Hx of CAD s/p placement of 2 LAUREN in RCA in 01/09/2022.   - Continue home ASA, Plavix and statin  - Chest pain 3/4 after breakfast; EKG nonischemic. PPI, Tums, carafate added     Pulmonary nodules  - Has stable bilateral pulmonary nodules/masses with broad differential per outpatient pulmonology notes. No pathology report as none of the nodules appeared to be safe for biopsy given high risk of PTX is high with many adjacent bulla. Plan working on promoting functional independence for the time being with possible addressing of nodules in the future, which is highly dependent upon his overall functionality.     Chronic pulmonary heart disease  - Follows up with Pulmonary clinic in [place]. Last seen in December and was evaluated for pulmonary nodules. Deemed to have mild COPD per notes. Not on any maintenance therapy.  - Continue albuterol inhaler   - likely needs follow-up with Pulmonary after discharge from [facility]     Embolic stroke involving right middle cerebral artery  - Hx of CVA in Jan 2022.   - CT Head with evolving infarcts in right frontal and left cerebellar hemispheres.   - Has had issues with memory per family since CVA   - No concern for acute stroke this admit per discussion with Radiology.   - Continue home antiplatelets, statin and Eliquis     Focal seizures  - Continue home lancosamide      Pressure injury of buttock, unstageable  - Seen by wound care with [facility] started  - Continues to cause patient discomfort  - initiate wound care consult at [facility]     Vitamin-D deficiency  - continue ergocalciferol 1000 units weekly     Hypomagnesemia  - magnesium oxide 400 mg BID      Peripheral neuropathy  - continue gabapentin 200 mg TID     Malnutrition of moderate degree  - RD following, appreciate recommendations, continue supplements as ordered     Debility   - Continue with PT/OT for gait training and strengthening and restoration of ADL's   - Encourage mobility, OOB in  chair, and early ambulation as appropriate  - Fall precautions   - Monitor for bowel and bladder dysfunction  - Monitor for and prevent skin breakdown and pressure ulcers  - Continue DVT prophylaxis with apixaban        Anticipate disposition:  Home with home health        Follow-up needed during SNF stay-     Appointment not to send patient to- Dr. Kumar (3/21)     Follow-up needed after discharge from SNF:      - PCP - 4/6  - pulmonary- needs to be scheduled- post COVID/COPD   - Endocrinology- needs to be scheduled- diabetes mellitus type 2       Future Appointments   Date Time Provider Department Center   4/6/2022  1:00 PM Basim Guerrero MD [facility]       Jazmín Zambrano NP  Department of Hospital Medicine   [facility]     DOS: 3/24/2022       Patient note was created using MModal Dictation.  Any errors in syntax or even information may not have been identified and edited on initial review prior to signing this note.

## 2022-03-24 NOTE — PLAN OF CARE
Problem: Adult Inpatient Plan of Care  Goal: Plan of Care Review  Outcome: Ongoing, Progressing  Goal: Patient-Specific Goal (Individualized)  Outcome: Ongoing, Progressing     Problem: Diabetes Comorbidity  Goal: Blood Glucose Level Within Targeted Range  Outcome: Ongoing, Progressing     Problem: Adjustment to Illness (Sepsis/Septic Shock)  Goal: Optimal Coping  Outcome: Ongoing, Progressing     Problem: Bleeding (Sepsis/Septic Shock)  Goal: Absence of Bleeding  Outcome: Ongoing, Progressing     Problem: Glycemic Control Impaired (Sepsis/Septic Shock)  Goal: Blood Glucose Level Within Desired Range  Outcome: Ongoing, Progressing     Problem: Infection Progression (Sepsis/Septic Shock)  Goal: Absence of Infection Signs and Symptoms  Outcome: Ongoing, Progressing     Problem: Nutrition Impaired (Sepsis/Septic Shock)  Goal: Optimal Nutrition Intake  Outcome: Ongoing, Progressing     Problem: Fluid and Electrolyte Imbalance (Acute Kidney Injury/Impairment)  Goal: Fluid and Electrolyte Balance  Outcome: Ongoing, Progressing

## 2022-03-24 NOTE — PT/OT/SLP PROGRESS
"Physical Therapy Treatment    Patient Name:  Kamar Muñoz   MRN:  [id number]  Admit Date: 3/10/2022  Admitting Diagnosis: Encephalopathy, metabolic  General Precautions: Standard, fall   Orthopedic Precautions:N/A   Braces: N/A     Recommendations:     Discharge Recommendations:  home health PT   Level of Assistance Recommended at Discharge: Intermittent assistance   Discharge Equipment Recommendations: bedside commode, walker, rolling, wheelchair   Barriers to discharge: Decreased caregiver support    Assessment:     Kamar Muñoz is a 76 y.o. male admitted with a medical diagnosis of Encephalopathy, metabolic . Pt is unsafe with functional mobility at this time due to pt requires supervision for bed mobility, CGA for transfers, and CGA for gait due to mild instability posterior with change of direction . Pt is motivated to progress with functional mobility.    Performance deficits affecting function:  weakness, impaired endurance, impaired self care skills, gait instability, impaired functional mobilty .    Rehab Potential is good    Activity Tolerance: Excellent    Plan:     Patient to be seen 6 x/week to address the above listed problems via gait training, therapeutic activities, therapeutic exercises, wheelchair management/training    · Plan of Care Expires: 04/10/22  · Plan of Care Reviewed with: patient    Subjective   "I want to go out in my back yard and garden" (PT educated pt that he should not be walking without assist and should only go outside with a person to assist him, he expressed understanding but not agreement).     Pain/Comfort:  · Pain Rating 1: 0/10  · Pain Rating Post-Intervention 1: 0/10    Patient's cultural, spiritual, Restorationist conflicts given the current situation:  · no    Objective:     Communicated with nurse prior to session.  Patient found supine with  (no lines) upon PT entry to room.     Therapeutic Activities and Exercises: pt performed cross  x 12 min on level 5 " "for B UE and LE strengthening    Functional Mobility:  · Bed Mobility:     · Supine to Sit: stand by assistance  · Sit to Supine: supervision  · Transfers:     · Sit to Stand:  contact guard assistance with rolling walker  · Gait: 150ft , 80ft, then 100ft with RW with CGA. pt with episode of instability posterior with change of direction  · Stairs:  Pt ascended/descended 4" curb step with Rolling Walker with no handrails with Contact Guard Assistance.     AM-PAC 6 CLICK MOBILITY  19    Patient left supine with call button in reach and nurse notified.    GOALS:   Multidisciplinary Problems     Physical Therapy Goals        Problem: Physical Therapy Goal    Goal Priority Disciplines Outcome Goal Variances Interventions   Physical Therapy Goal     PT, PT/OT Ongoing, Progressing     Description: Goals to be met by: 3/25/22    Patient will increase functional independence with mobility by performing:    . Supine to sit with [place]-met 3/24/22  . Sit to supine with [place]-met 3/24/22  . Rolling to Left and Right with [place].  . Sit to stand transfer with Supervision-met 3/24/22  . Bed to chair transfer with Supervision using No Assistive Device  . Gait  x 150 feet with Supervision using Rolling Walker.   . Wheelchair propulsion x150 feet with Supervision using bilateral uppper extremities  . Ascend/descend 4 stair with bilateral Handrails Contact Guard Assistance using No Assistive Device.   . Ascend/Descend 4 inch curb step with Contact Guard Assistance using Rolling Walker. - Met 03/19  . Retrieve object off floor in standing with RW and reacher and SBA.- Met 03/19                     Time Tracking:     PT Received On: 03/24/22  PT Total Time (min):   39    Billable Minutes: Gait Training 25 and Therapeutic Exercise 14    Treatment Type: Treatment  PT/PTA: PT     PTA Visit Number: 0     03/24/2022  "

## 2022-03-24 NOTE — PLAN OF CARE
Problem: Physical Therapy Goal  Goal: Physical Therapy Goal  Description: Goals to be met by: 3/25/22    Patient will increase functional independence with mobility by performing:    . Supine to sit with [place]-met 3/24/22  . Sit to supine with [place]-met 3/24/22  . Rolling to Left and Right with [place].  . Sit to stand transfer with Supervision-met 3/24/22  . Bed to chair transfer with Supervision using No Assistive Device  . Gait  x 150 feet with Supervision using Rolling Walker.   . Wheelchair propulsion x150 feet with Supervision using bilateral uppper extremities  . Ascend/descend 4 stair with bilateral Handrails Contact Guard Assistance using No Assistive Device.   . Ascend/Descend 4 inch curb step with Contact Guard Assistance using Rolling Walker. - Met 03/19  . Retrieve object off floor in standing with RW and reacher and SBA.- Met 03/19    Outcome: Ongoing, Progressing   Pt's goals remain appropriate and pt will continue to benefit from skilled PT services to work towards improved functional mobility including:  transfers, up/down steps, and gait.   3/24/2022

## 2022-03-25 ENCOUNTER — HOSPITAL ENCOUNTER (EMERGENCY)
Facility: HOSPITAL | Age: 77
Discharge: NURSING, REHAB OR LONG-TERM CARE | End: 2022-03-25
Attending: EMERGENCY MEDICINE
Payer: MEDICARE

## 2022-03-25 VITALS
SYSTOLIC BLOOD PRESSURE: 139 MMHG | TEMPERATURE: 98 F | RESPIRATION RATE: 15 BRPM | HEART RATE: 94 BPM | DIASTOLIC BLOOD PRESSURE: 75 MMHG | OXYGEN SATURATION: 96 %
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
## 2022-03-25 DIAGNOSIS — R73.9 HYPERGLYCEMIA: ICD-10-CM

## 2022-03-25 DIAGNOSIS — S09.90XA CLOSED HEAD INJURY, INITIAL ENCOUNTER: ICD-10-CM

## 2022-03-25 LAB
ALBUMIN SERPL BCP-MCNC: 2.6 G/DL (ref 3.5–5.2)
ALP SERPL-CCNC: 113 U/L (ref 55–135)
ALT SERPL W/O P-5'-P-CCNC: 17 U/L (ref 10–44)
ANION GAP SERPL CALC-SCNC: 9 MMOL/L (ref 8–16)
AST SERPL-CCNC: 22 U/L (ref 10–40)
BACTERIA #/AREA URNS AUTO: ABNORMAL /HPF
BASOPHILS # BLD AUTO: 0.06 K/UL (ref 0–0.2)
BASOPHILS NFR BLD: 0.7 % (ref 0–1.9)
BILIRUB SERPL-MCNC: 0.5 MG/DL (ref 0.1–1)
BILIRUB UR QL STRIP: NEGATIVE
BUN SERPL-MCNC: 21 MG/DL (ref 8–23)
CALCIUM SERPL-MCNC: 9 MG/DL (ref 8.7–10.5)
CHLORIDE SERPL-SCNC: 94 MMOL/L (ref 95–110)
CLARITY UR REFRACT.AUTO: CLEAR
CO2 SERPL-SCNC: 27 MMOL/L (ref 23–29)
COLOR UR AUTO: ABNORMAL
CREAT SERPL-MCNC: 1 MG/DL (ref 0.5–1.4)
DIFFERENTIAL METHOD: ABNORMAL
EOSINOPHIL # BLD AUTO: 0.2 K/UL (ref 0–0.5)
EOSINOPHIL NFR BLD: 1.9 % (ref 0–8)
ERYTHROCYTE [DISTWIDTH] IN BLOOD BY AUTOMATED COUNT: 17.4 % (ref 11.5–14.5)
EST. GFR  (AFRICAN AMERICAN): >60 ML/MIN/1.73 M^2
EST. GFR  (NON AFRICAN AMERICAN): >60 ML/MIN/1.73 M^2
GLUCOSE SERPL-MCNC: 429 MG/DL (ref 70–110)
GLUCOSE UR QL STRIP: ABNORMAL
HCT VFR BLD AUTO: 37.1 % (ref 40–54)
HGB BLD-MCNC: 11.8 G/DL (ref 14–18)
HGB UR QL STRIP: ABNORMAL
IMM GRANULOCYTES # BLD AUTO: 0.03 K/UL (ref 0–0.04)
IMM GRANULOCYTES NFR BLD AUTO: 0.3 % (ref 0–0.5)
KETONES UR QL STRIP: ABNORMAL
LEUKOCYTE ESTERASE UR QL STRIP: NEGATIVE
LYMPHOCYTES # BLD AUTO: 1.3 K/UL (ref 1–4.8)
LYMPHOCYTES NFR BLD: 14.5 % (ref 18–48)
MCH RBC QN AUTO: 29.4 PG (ref 27–31)
MCHC RBC AUTO-ENTMCNC: 31.8 G/DL (ref 32–36)
MCV RBC AUTO: 92 FL (ref 82–98)
MICROSCOPIC COMMENT: ABNORMAL
MONOCYTES # BLD AUTO: 0.7 K/UL (ref 0.3–1)
MONOCYTES NFR BLD: 7.9 % (ref 4–15)
NEUTROPHILS # BLD AUTO: 6.8 K/UL (ref 1.8–7.7)
NEUTROPHILS NFR BLD: 74.7 % (ref 38–73)
NITRITE UR QL STRIP: NEGATIVE
NRBC BLD-RTO: 0 /100 WBC
PH UR STRIP: 6 [PH] (ref 5–8)
PLATELET # BLD AUTO: 234 K/UL (ref 150–450)
PMV BLD AUTO: 9.9 FL (ref 9.2–12.9)
POCT GLUCOSE: 341 MG/DL (ref 70–110)
POCT GLUCOSE: 377 MG/DL (ref 70–110)
POCT GLUCOSE: 398 MG/DL (ref 70–110)
POCT GLUCOSE: 435 MG/DL (ref 70–110)
POCT GLUCOSE: 491 MG/DL (ref 70–110)
POTASSIUM SERPL-SCNC: 5 MMOL/L (ref 3.5–5.1)
PROT SERPL-MCNC: 6.9 G/DL (ref 6–8.4)
PROT UR QL STRIP: NEGATIVE
RBC # BLD AUTO: 4.02 M/UL (ref 4.6–6.2)
RBC #/AREA URNS AUTO: 87 /HPF (ref 0–4)
SODIUM SERPL-SCNC: 130 MMOL/L (ref 136–145)
SP GR UR STRIP: 1.01 (ref 1–1.03)
SQUAMOUS #/AREA URNS AUTO: 0 /HPF
URN SPEC COLLECT METH UR: ABNORMAL
WBC # BLD AUTO: 9.13 K/UL (ref 3.9–12.7)
WBC #/AREA URNS AUTO: 1 /HPF (ref 0–5)
YEAST UR QL AUTO: ABNORMAL

## 2022-03-25 PROCEDURE — 25000003 PHARM REV CODE 250

## 2022-03-25 PROCEDURE — 99284 PR EMERGENCY DEPT VISIT,LEVEL IV: ICD-10-PCS | Mod: ,,, | Performed by: EMERGENCY MEDICINE

## 2022-03-25 PROCEDURE — 99284 EMERGENCY DEPT VISIT MOD MDM: CPT | Mod: ,,, | Performed by: EMERGENCY MEDICINE

## 2022-03-25 PROCEDURE — 81001 URINALYSIS AUTO W/SCOPE: CPT | Performed by: EMERGENCY MEDICINE

## 2022-03-25 PROCEDURE — 80053 COMPREHEN METABOLIC PANEL: CPT | Performed by: EMERGENCY MEDICINE

## 2022-03-25 PROCEDURE — 25000003 PHARM REV CODE 250: Performed by: HOSPITALIST

## 2022-03-25 PROCEDURE — 96374 THER/PROPH/DIAG INJ IV PUSH: CPT

## 2022-03-25 PROCEDURE — 25000003 PHARM REV CODE 250: Performed by: NURSE PRACTITIONER

## 2022-03-25 PROCEDURE — 96361 HYDRATE IV INFUSION ADD-ON: CPT

## 2022-03-25 PROCEDURE — 99285 EMERGENCY DEPT VISIT HI MDM: CPT | Mod: 25

## 2022-03-25 PROCEDURE — 85025 COMPLETE CBC W/AUTO DIFF WBC: CPT | Performed by: EMERGENCY MEDICINE

## 2022-03-25 PROCEDURE — 82962 GLUCOSE BLOOD TEST: CPT | Mod: 91

## 2022-03-25 PROCEDURE — 63600175 PHARM REV CODE 636 W HCPCS: Performed by: EMERGENCY MEDICINE

## 2022-03-25 PROCEDURE — 93005 ELECTROCARDIOGRAM TRACING: CPT

## 2022-03-25 PROCEDURE — 93010 ELECTROCARDIOGRAM REPORT: CPT | Mod: ,,, | Performed by: INTERNAL MEDICINE

## 2022-03-25 PROCEDURE — 11000004 HC SNF PRIVATE

## 2022-03-25 PROCEDURE — 93010 EKG 12-LEAD: ICD-10-PCS | Mod: ,,, | Performed by: INTERNAL MEDICINE

## 2022-03-25 RX ADMIN — PANTOPRAZOLE SODIUM 40 MG: 40 TABLET, DELAYED RELEASE ORAL at 03:03

## 2022-03-25 RX ADMIN — OXYCODONE 5 MG: 5 TABLET ORAL at 09:03

## 2022-03-25 RX ADMIN — ERGOCALCIFEROL 50000 UNITS: 1.25 CAPSULE ORAL at 03:03

## 2022-03-25 RX ADMIN — Medication 6 MG: at 09:03

## 2022-03-25 RX ADMIN — CETIRIZINE HYDROCHLORIDE 10 MG: 5 TABLET ORAL at 09:03

## 2022-03-25 RX ADMIN — INSULIN HUMAN 9 UNITS: 100 INJECTION, SOLUTION PARENTERAL at 10:03

## 2022-03-25 RX ADMIN — Medication 500 MG: at 09:03

## 2022-03-25 RX ADMIN — ASPIRIN 81 MG: 81 TABLET, COATED ORAL at 03:03

## 2022-03-25 RX ADMIN — INSULIN ASPART 2 UNITS: 100 INJECTION, SOLUTION INTRAVENOUS; SUBCUTANEOUS at 10:03

## 2022-03-25 RX ADMIN — CLOPIDOGREL 75 MG: 75 TABLET, FILM COATED ORAL at 03:03

## 2022-03-25 RX ADMIN — METFORMIN HYDROCHLORIDE 1000 MG: 500 TABLET ORAL at 06:03

## 2022-03-25 RX ADMIN — LOSARTAN POTASSIUM 25 MG: 25 TABLET, FILM COATED ORAL at 03:03

## 2022-03-25 RX ADMIN — THERA TABS 1 TABLET: TAB at 03:03

## 2022-03-25 RX ADMIN — SENNOSIDES AND DOCUSATE SODIUM 1 TABLET: 50; 8.6 TABLET ORAL at 09:03

## 2022-03-25 RX ADMIN — AMIODARONE HYDROCHLORIDE 200 MG: 200 TABLET ORAL at 03:03

## 2022-03-25 RX ADMIN — SUCRALFATE 1 G: 1 TABLET ORAL at 06:03

## 2022-03-25 RX ADMIN — INSULIN ASPART 5 UNITS: 100 INJECTION, SOLUTION INTRAVENOUS; SUBCUTANEOUS at 06:03

## 2022-03-25 RX ADMIN — SODIUM CHLORIDE 1000 ML: 0.9 INJECTION, SOLUTION INTRAVENOUS at 09:03

## 2022-03-25 RX ADMIN — Medication 400 MG: at 09:03

## 2022-03-25 RX ADMIN — ATORVASTATIN CALCIUM 40 MG: 20 TABLET, FILM COATED ORAL at 03:03

## 2022-03-25 RX ADMIN — BACITRACIN ZINC, NEOMYCIN SULFATE, AND POLYMYXIN B SULFATE: 400; 3.5; 5 OINTMENT TOPICAL at 09:03

## 2022-03-25 RX ADMIN — LACOSAMIDE 100 MG: 50 TABLET, FILM COATED ORAL at 09:03

## 2022-03-25 RX ADMIN — DICLOFENAC SODIUM 4 G: 10 GEL TOPICAL at 09:03

## 2022-03-25 RX ADMIN — GABAPENTIN 200 MG: 100 CAPSULE ORAL at 03:03

## 2022-03-25 RX ADMIN — APIXABAN 5 MG: 2.5 TABLET, FILM COATED ORAL at 09:03

## 2022-03-25 RX ADMIN — INSULIN ASPART 9 UNITS: 100 INJECTION, SOLUTION INTRAVENOUS; SUBCUTANEOUS at 06:03

## 2022-03-25 RX ADMIN — GABAPENTIN 200 MG: 100 CAPSULE ORAL at 09:03

## 2022-03-25 NOTE — NURSING
Patient found on floor in patient's bathroom by PCT. Patient was AAOX4 during assessment. Skin tears noted to left forearm.   Patient stated his brief was wet. He rolled himself in his wheelchair to the bathroom. Took his wet  pants off and attempted to hang them onto the railing. Wheels were locked. Patient slid onto the floor. Patient was found laying on floor on his left side. Skin tears noted to his forearm and patient hit head on the door. Patient assisted to wheelchair. Vital signs taken. Able to move all extremities freely. Applied clean brief, socks, and pants. Steri strips and dressings applied to patient's skin tears. Patient told not to get out of bed without assistance. Left voice message to daughter (Basia) and spoke with Daughter (Marisa).

## 2022-03-25 NOTE — PLAN OF CARE
Problem: Adult Inpatient Plan of Care  Goal: Plan of Care Review  Outcome: Ongoing, Progressing  Goal: Patient-Specific Goal (Individualized)  Outcome: Ongoing, Progressing  Goal: Absence of Hospital-Acquired Illness or Injury  Outcome: Ongoing, Progressing  Goal: Optimal Comfort and Wellbeing  Outcome: Ongoing, Progressing  Goal: Readiness for Transition of Care  Outcome: Ongoing, Progressing     Problem: Diabetes Comorbidity  Goal: Blood Glucose Level Within Targeted Range  Outcome: Ongoing, Progressing     Problem: Adjustment to Illness (Sepsis/Septic Shock)  Goal: Optimal Coping  Outcome: Ongoing, Progressing     Problem: Fluid and Electrolyte Imbalance (Acute Kidney Injury/Impairment)  Goal: Fluid and Electrolyte Balance  Outcome: Ongoing, Progressing     Problem: Oral Intake Inadequate (Acute Kidney Injury/Impairment)  Goal: Optimal Nutrition Intake  Outcome: Ongoing, Progressing

## 2022-03-25 NOTE — ED NOTES
The patient was brought to the ER today by [facility] ems unit 356 from [facility] after a fall and hit his head. Pt denies neck pain. Pt arrived with skin tears to left arm. Denies headache. Denies visual changes. Pt awake, alert and oriented. Following commands.  Pt arrived with bed sore to coccyx and buttocks area (documented pta) . Fall risk and allergy band on. Steri strips in place in left forearm skin tears and covered with mepilex dressing prior to arrival. Pt is on eliquis and plavix. Pt originally at [facility] for debility following covid 19 respiratory insufficiency and continued insulin adjustments. Patient also hyperglycemic per ems

## 2022-03-25 NOTE — NURSING
Patient's bedtime glucose was 45. Glucose tablets offered but refused. Patient consumed two containers of orange juice and one chocolate boost. An hour later glucose check was 93. 16 g of glucose tablets given.

## 2022-03-25 NOTE — PLAN OF CARE
Problem: Adult Inpatient Plan of Care  Goal: Plan of Care Review  Outcome: Ongoing, Progressing  Goal: Patient-Specific Goal (Individualized)  Outcome: Ongoing, Progressing     Problem: Diabetes Comorbidity  Goal: Blood Glucose Level Within Targeted Range  Outcome: Ongoing, Progressing     Problem: Adjustment to Illness (Sepsis/Septic Shock)  Goal: Optimal Coping  Outcome: Ongoing, Progressing     Problem: Bleeding (Sepsis/Septic Shock)  Goal: Absence of Bleeding  Outcome: Ongoing, Progressing     Problem: Glycemic Control Impaired (Sepsis/Septic Shock)  Goal: Blood Glucose Level Within Desired Range  Outcome: Ongoing, Progressing     Problem: Infection Progression (Sepsis/Septic Shock)  Goal: Absence of Infection Signs and Symptoms  Outcome: Ongoing, Progressing     Problem: Nutrition Impaired (Sepsis/Septic Shock)  Goal: Optimal Nutrition Intake  Outcome: Ongoing, Progressing     Problem: Fluid and Electrolyte Imbalance (Acute Kidney Injury/Impairment)  Goal: Fluid and Electrolyte Balance  Outcome: Ongoing, Progressing     Problem: Oral Intake Inadequate (Acute Kidney Injury/Impairment)  Goal: Optimal Nutrition Intake  Outcome: Ongoing, Progressing     Problem: Renal Function Impairment (Acute Kidney Injury/Impairment)  Goal: Effective Renal Function  Outcome: Ongoing, Progressing     Problem: Impaired Wound Healing  Goal: Optimal Wound Healing  Outcome: Ongoing, Progressing     Problem: Fall Injury Risk  Goal: Absence of Fall and Fall-Related Injury  Outcome: Ongoing, Progressing     Problem: Skin Injury Risk Increased  Goal: Skin Health and Integrity  Outcome: Ongoing, Progressing     Problem: Malnutrition  Goal: Improved Nutritional Intake  Outcome: Ongoing, Progressing

## 2022-03-25 NOTE — DISCHARGE INSTRUCTIONS
Today, your evaluation did not show any abnormalities on your CT imaging of your neck or brain.  Your hyperglycemic which is common for you.  You did not take her morning insulin.  We have given you IV fluids, and insulin to improve your blood sugar.  Please continue to monitor at home and continue carbohydrate control for your diabetes.  Follow-up with your primary care for repeat evaluation.    Our goal in the emergency department is to always give you outstanding care and exceptional service. You may receive a survey by mail or e-mail in the next week regarding your experience in our ED. We would greatly appreciate your completing and returning the survey. Your feedback provides us with a way to recognize our staff who give very good care and it helps us learn how to improve when your experience was below our aspiration of excellence.

## 2022-03-25 NOTE — NURSING
Dr. RAYMOND Frazier notified of patient's fall. Instructed to send to ED for CT scan. Report given to Yara at [facility] ED. Awaiting [facility] ambulance service for transport. Patient made aware of new orders.

## 2022-03-25 NOTE — ED PROVIDER NOTES
Encounter Date: 3/25/2022       History     Chief Complaint   Patient presents with    Fall     Pt arrives from [facility] after having a fall from bed this am. Pt is AAOx4 but reports hitting his head. +blood thinners     Kamar Muñoz is a 76 year old male with CAD s/p 2 LAUREN in RCA (Jan 2022), HTN, HLD, p. A. 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  He reports that he had gotten wet and was leaning to hang dry his clothes in preparation to possible discharge today when he slipped out of his locked wheelchair. He slid to the ground while leaning and hit his left temporal/parietal scalp and left arm. He has some skin tears on his left arm with bleeding. He is currently on Eliquis, Plavix, and Aspirin.  He denies n/v, headache, dizziness, CP, SOB, fever, and chills. He reports feeling no pain and is fine.        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 8/2/2012    STEMI involving right coronary artery 1/9/2022    Type II or unspecified type diabetes mellitus with neurological manifestations, uncontrolled(250.62) 3/8/2013    Urinary tract infection      Past Surgical History:   Procedure Laterality Date    BACK SURGERY      CATARACT EXTRACTION W/  INTRAOCULAR LENS IMPLANT Right     Per Dr Romero note 11/2018    COLONOSCOPY N/A 1/28/2019    Procedure: COLONOSCOPY Suprep;  Surgeon: Anh Johnson MD;  Location: [facility] ENDO;  Service: Endoscopy;  Laterality: N/A;    EYE SURGERY      HERNIA REPAIR      LEFT HEART CATHETERIZATION Left 1/9/2022    Procedure:  CATHETERIZATION, HEART, LEFT;  Surgeon: Will Hurst III, MD;  Location: [facility] CATH LAB/EP;  Service: Cardiology;  Laterality: Left;    renal stones      SHOULDER OPEN ROTATOR CUFF REPAIR       Family History   Problem Relation Age of Onset    Diabetes Father     Prostate cancer Neg Hx     Kidney disease Neg Hx      Social History     Tobacco Use    Smoking status: Former Smoker     Packs/day: 1.50     Years: 25.00     Pack years: 37.50     Quit date: 1983     Years since quittin.2    Smokeless tobacco: Never Used   Substance Use Topics    Alcohol use: No    Drug use: No     Review of Systems   Constitutional: Negative for chills and fever.   HENT: Negative for congestion and rhinorrhea.    Eyes: Negative for pain and visual disturbance.   Respiratory: Negative for chest tightness and shortness of breath.    Cardiovascular: Negative for chest pain and palpitations.   Gastrointestinal: Negative for abdominal distention, abdominal pain, constipation, diarrhea, nausea and vomiting.   Genitourinary: Negative for difficulty urinating and dysuria.   Musculoskeletal: Positive for back pain (chronic). Negative for neck pain.   Skin: Positive for pallor and wound (left arm bruising and skin tears from fall). Negative for rash.   Neurological: Negative for dizziness and headaches.   Hematological: Bruises/bleeds easily.   Psychiatric/Behavioral: Negative for confusion and sleep disturbance.       Physical Exam     Initial Vitals [22 0654]   BP Pulse Resp Temp SpO2   139/65 84 20 98.7 °F (37.1 °C) 99 %      MAP       --         Physical Exam    Nursing note and vitals reviewed.  Constitutional: He appears well-developed and well-nourished. He is not diaphoretic. No distress.   HENT:   Head: Normocephalic.   Scalp with scaling on R. Frontal scalp.    Eyes: Conjunctivae and EOM are normal. No scleral icterus.   Neck: Neck supple.   Normal range of motion.  Cardiovascular: Normal rate, regular rhythm,  normal heart sounds and intact distal pulses.   Pulmonary/Chest: Breath sounds normal. He has no rales.   Abdominal: Abdomen is soft. Bowel sounds are normal. He exhibits no distension. There is no abdominal tenderness.   Musculoskeletal:      Cervical back: Normal range of motion and neck supple.     Neurological: He is alert and oriented to person, place, and time. He has normal reflexes. A cranial nerve deficit (anisocoria, left eye >Right eye, ) is present.   Skin: Skin is dry. Rash noted.   Sacral decub, left arm skin tears, scalp erythema over left parietal area     Psychiatric: He has a normal mood and affect. His behavior is normal. Judgment and thought content normal.         ED Course   Procedures  Labs Reviewed   CBC W/ AUTO DIFFERENTIAL - Abnormal; Notable for the following components:       Result Value    RBC 4.02 (*)     Hemoglobin 11.8 (*)     Hematocrit 37.1 (*)     MCHC 31.8 (*)     RDW 17.4 (*)     Gran % 74.7 (*)     Lymph % 14.5 (*)     All other components within normal limits   COMPREHENSIVE METABOLIC PANEL - Abnormal; Notable for the following components:    Sodium 130 (*)     Chloride 94 (*)     Glucose 429 (*)     Albumin 2.6 (*)     All other components within normal limits   URINALYSIS, REFLEX TO URINE CULTURE - Abnormal; Notable for the following components:    Glucose, UA 3+ (*)     Ketones, UA 1+ (*)     Occult Blood UA 2+ (*)     All other components within normal limits    Narrative:     Specimen Source->Urine   URINALYSIS MICROSCOPIC - Abnormal; Notable for the following components:    RBC, UA 87 (*)     Yeast, UA Occasional (*)     All other components within normal limits    Narrative:     Specimen Source->Urine   POCT GLUCOSE - Abnormal; Notable for the following components:    POCT Glucose 435 (*)     All other components within normal limits   POCT GLUCOSE - Abnormal; Notable for the following components:    POCT Glucose 398 (*)     All other components within normal limits    POCT GLUCOSE - Abnormal; Notable for the following components:    POCT Glucose 377 (*)     All other components within normal limits     EKG Readings: (Independently Interpreted)   Initial Reading: No STEMI. Previous EKG: Compared with most recent EKG Rhythm: Normal Sinus Rhythm. Heart Rate: 86. ST Segments: Non-Specific ST Segment Depression.     ECG Results          EKG 12-lead (Final result)  Result time 03/25/22 10:02:32    Final result by Interface, Lab In [facility] (03/25/22 10:02:32)                 Narrative:    Test Reason : W19.XXXA,    Vent. Rate : 086 BPM     Atrial Rate : 086 BPM     P-R Int : 188 ms          QRS Dur : 108 ms      QT Int : 402 ms       P-R-T Axes : 081 019 -13 degrees     QTc Int : 481 ms    Normal sinus rhythm  Nonspecific ST and T wave abnormality  Abnormal ECG  When compared with ECG of 06-MAR-2022 07:03,  Premature supraventricular complexes are no longer Present  T wave inversion no longer evident in Anterior-lateral leads  Confirmed by DEMETRIA MURPHY MD ([id number]) on 3/25/2022 10:02:22 AM    Referred By: [email or web address]   SELF           Confirmed By:DEMETRIA MURPHY MD                            Imaging Results          CT Cervical Spine Without Contrast (Final result)  Result time 03/25/22 09:30:29    Final result by Tray Caldera MD (03/25/22 09:30:29)                 Impression:      1. No definite acute intracranial findings.  Continued maturation of right frontal left cerebellar infarcts in compared to prior CT performed 02/19/2022.  2. No acute cervical spine fracture.  Similar degenerative findings in the cervical spine.  3. Partially characterized advanced emphysematous changes and numerous pulmonary nodules. When compared to prior chest CT performed 12/01/2021, there been further consolidation a somewhat cavitary focus noted in the right upper lobe, with nodule now measuring up to 2.2 x 1.9 cm.  Given change in appearance, 1 month follow-up chest CT is recommended.  4. Dependent  areas of increased attenuation in the visualized upper lower lobes appear new since the prior study could relate to subsegmental atelectasis, and edema, infection, or non infectious inflammatory process not excluded.  5. Additional details, as provided in the body of report.      Electronically signed by: Tray Caldera  Date:    03/25/2022  Time:    09:30             Narrative:    EXAMINATION:  CT HEAD WITHOUT CONTRAST; CT CERVICAL SPINE WITHOUT CONTRAST    CLINICAL HISTORY:  Head trauma, minor (Age >= 65y);; Neck trauma (Age >= 65y);    TECHNIQUE:  Low dose axial images were obtained through the head and cervical spine.  Coronal and sagittal reformations were also performed. Contrast was not administered.    COMPARISON:  CT head and cervical spine performed 02/17/2022.  CT head performed 02/19/2022.    FINDINGS:  Head:    Blood: No acute intracranial hemorrhage.    Parenchyma: No definite loss of gray-white differentiation to suggest acute or subacute transcortical infarct. Relative to prior CT head performed 02/19/2022, there is continued maturation of right frontal and left cerebellar encephalomalacia and gliosis related to prior infarcts.  Elsewhere, there is a generalized pattern of age-related parenchymal volume loss.  Nonspecific areas of white matter hypoattenuation may relate to sequela of chronic small vessel ischemic disease.    Ventricles/Extra-axial spaces: No abnormal extra-axial fluid collection. Basal cisterns are patent.    Vessels: Vascular calcifications    Orbits: Status post bilateral lens replacements    Scalp: Unremarkable.    Skull: There are no depressed skull fractures or destructive bone lesions.    Sinuses and mastoids: Scattered minor paranasal sinus mucosal thickening.  Pneumatized middle turbinates    Other findings: None    Cervical spine:    Fractures: No acute fractures    Alignment: Minor grade 1 anterolisthesis of C3 on C4 and of C7-T1.  Atlanto-axial and atlanto-occipital  joints: Atlanto-axial and atlanto-occipital intervals are not widened.  Facet joints: There is no traumatic facet joint widening.  Multilevel degenerative set arthropathy.  Vertebral bodies: Osseous demineralization.  Degenerative endplate change.  Discs: Degenerative disc osteophyte complex formation  Spinal canal and foraminal narrowing: Although CT does not optimally evaluate the soft tissue contents of the spinal canal and foramina, no critical stenosis is suggested.  Degree of spinal canal foraminal narrowing, grossly unchanged relative to most recent CT imaging performed 02/17/2022.  Paraspinal soft tissues: Unremarkable.    Upper Lungs:Redemonstrated advanced emphysematous changes in the lungs.  Pleuroparenchymal scarring is noted.  When compared to prior chest CT performed 12/01/2021, there been further consolidation a somewhat cavitary focus noted in the right upper lobe, with nodule now measuring up to 2.2 x 1.9 cm (series 3, image 359).  Additional 18 mm solid nodule in the right upper lobe (series 3, image 446), appears relatively similar to the 12/01/2021 chest CT.    Dependent areas of increased attenuation in the visualized upper lower lobes appear new since the prior study could relate to subsegmental atelectasis, and edema, infection, or non infectious inflammatory process not excluded.                               CT Head Without Contrast (Final result)  Result time 03/25/22 09:30:29    Final result by Tray Caldera MD (03/25/22 09:30:29)                 Impression:      1. No definite acute intracranial findings.  Continued maturation of right frontal left cerebellar infarcts in compared to prior CT performed 02/19/2022.  2. No acute cervical spine fracture.  Similar degenerative findings in the cervical spine.  3. Partially characterized advanced emphysematous changes and numerous pulmonary nodules. When compared to prior chest CT performed 12/01/2021, there been further consolidation a  somewhat cavitary focus noted in the right upper lobe, with nodule now measuring up to 2.2 x 1.9 cm.  Given change in appearance, 1 month follow-up chest CT is recommended.  4. Dependent areas of increased attenuation in the visualized upper lower lobes appear new since the prior study could relate to subsegmental atelectasis, and edema, infection, or non infectious inflammatory process not excluded.  5. Additional details, as provided in the body of report.      Electronically signed by: Tray Caldera  Date:    03/25/2022  Time:    09:30             Narrative:    EXAMINATION:  CT HEAD WITHOUT CONTRAST; CT CERVICAL SPINE WITHOUT CONTRAST    CLINICAL HISTORY:  Head trauma, minor (Age >= 65y);; Neck trauma (Age >= 65y);    TECHNIQUE:  Low dose axial images were obtained through the head and cervical spine.  Coronal and sagittal reformations were also performed. Contrast was not administered.    COMPARISON:  CT head and cervical spine performed 02/17/2022.  CT head performed 02/19/2022.    FINDINGS:  Head:    Blood: No acute intracranial hemorrhage.    Parenchyma: No definite loss of gray-white differentiation to suggest acute or subacute transcortical infarct. Relative to prior CT head performed 02/19/2022, there is continued maturation of right frontal and left cerebellar encephalomalacia and gliosis related to prior infarcts.  Elsewhere, there is a generalized pattern of age-related parenchymal volume loss.  Nonspecific areas of white matter hypoattenuation may relate to sequela of chronic small vessel ischemic disease.    Ventricles/Extra-axial spaces: No abnormal extra-axial fluid collection. Basal cisterns are patent.    Vessels: Vascular calcifications    Orbits: Status post bilateral lens replacements    Scalp: Unremarkable.    Skull: There are no depressed skull fractures or destructive bone lesions.    Sinuses and mastoids: Scattered minor paranasal sinus mucosal thickening.  Pneumatized middle  turbinates    Other findings: None    Cervical spine:    Fractures: No acute fractures    Alignment: Minor grade 1 anterolisthesis of C3 on C4 and of C7-T1.  Atlanto-axial and atlanto-occipital joints: Atlanto-axial and atlanto-occipital intervals are not widened.  Facet joints: There is no traumatic facet joint widening.  Multilevel degenerative set arthropathy.  Vertebral bodies: Osseous demineralization.  Degenerative endplate change.  Discs: Degenerative disc osteophyte complex formation  Spinal canal and foraminal narrowing: Although CT does not optimally evaluate the soft tissue contents of the spinal canal and foramina, no critical stenosis is suggested.  Degree of spinal canal foraminal narrowing, grossly unchanged relative to most recent CT imaging performed 02/17/2022.  Paraspinal soft tissues: Unremarkable.    Upper Lungs:Redemonstrated advanced emphysematous changes in the lungs.  Pleuroparenchymal scarring is noted.  When compared to prior chest CT performed 12/01/2021, there been further consolidation a somewhat cavitary focus noted in the right upper lobe, with nodule now measuring up to 2.2 x 1.9 cm (series 3, image 359).  Additional 18 mm solid nodule in the right upper lobe (series 3, image 446), appears relatively similar to the 12/01/2021 chest CT.    Dependent areas of increased attenuation in the visualized upper lower lobes appear new since the prior study could relate to subsegmental atelectasis, and edema, infection, or non infectious inflammatory process not excluded.                              X-Rays:   Independently Interpreted Readings:   Head CT: No hemorrhage.  No skull fracture.  No acute stroke.     Medications   sodium chloride 0.9% bolus 1,000 mL (0 mLs Intravenous Stopped 3/25/22 1017)   insulin regular injection 9 Units (9 Units Intravenous Given 3/25/22 1049)     Medical Decision Making:   History:   I obtained history from: EMS provider.       <> Summary of History: Brought  in for fall evaluation.  Old Medical Records: I decided to obtain old medical records.  Old Records Summarized: records from previous admission(s).       <> Summary of Records: Previous history of stroke, at rehab facility.  Initial Assessment:   75 yo male with history of stroke and STEMI presents after fall. On Eliquis for afib and DAPT after recent stroke.  Differential Diagnosis:   Stroke, ICH, ACS, UTI, electrolyte derangements  Independently Interpreted Test(s):   I have ordered and independently interpreted X-rays - see prior notes.  I have ordered and independently interpreted EKG Reading(s) - see prior notes  Clinical Tests:   Lab Tests: Ordered and Reviewed  The following lab test(s) were unremarkable: CBC, Urinalysis and CMP  Radiological Study: Ordered and Reviewed  Medical Tests: Ordered and Reviewed  ED Management:  Afebrile and VSS  EKG without STEMI, NSR  CT H and neck without acute bleed or fracture, noted incidental right pulmonary finding for 1 month followup  cbg = 398, Na 130 and Cl 94,   1L NS given  UA with glucosuria, occult blood, and protein , asymptomatic and no complaints  HypoAlbuminemia noted 2.6, but recovering from previous  Regular morning Insulin given 9units short acting    Plan to discharge back to SNF. Patient is stable and reports no acute issues.  Other:   I discussed test(s) with the performing physician.            Attending Attestation:   Physician Attestation Statement for Resident:  As the supervising MD   Physician Attestation Statement: I have personally seen and examined this patient.   I agree with the above history. -:   As the supervising MD I agree with the above PE.    As the supervising MD I agree with the above treatment, course, plan, and disposition.                       I have reviewed and concur with the resident's history, physical, assessment, and plan.  I have personally interviewed and examined the patient at bedside.  See below addendum for my  evaluation and additional findings. I did supervise any and all procedures and was present for any critical portion, and was always immediately available for help and as a resource.     Briefly,  this is a 76 y.o.male with a history of CAD, hypertension, hyperlipidemia, diabetes, atrial fibrillation, recent CVA who presents from skilled nursing facility following a slip and fall.  He has a closed-head injury and currently on Eliquis, Plavix and aspirin.  No obvious lacerations needing repair.  Mildly tender along the head.  Given age and risk factors will obtain CT head and CT cervical spine.  Negative for acute findings.  He is hyperglycemic and being managed at skilled nursing facility.  Given home insulin dose, and discharged in stable condition back to his facility. Patient agreeable to discharge plan. Strict ED precautions and return instructions discussed at length and patient verbalized understanding. All questions were answered and ample time was given for questions.        Complexity: High - level 5    Final diagnoses:  [W19.XXXA] Fall (Primary)  [R73.9] Hyperglycemia  [S09.90XA] Closed head injury, initial encounter       Aly Mclean DO, PATRICIA  Emergency Staff Physician   Dept of Emergency Medicine   [facility]  Spectralink: [id number]        Disclaimer: This note has been generated using voice-recognition software. There may be typographical errors that have been missed during proof-reading.          Clinical Impression:   Final diagnoses:  [W19.XXXA] Fall (Primary)  [R73.9] Hyperglycemia  [S09.90XA] Closed head injury, initial encounter          ED Disposition Condition    Discharge Stable        ED Prescriptions     None        Follow-up Information     Follow up With Specialties Details Why Contact Info    Basim Guerrero MD Family Medicine Schedule an appointment as soon as possible for a visit in 1 week  [address] [facility] [address] [facility] [place] [address] [phone]             Yuliana  MD Kristopher  Resident  03/25/22 1109       Yuliana Summers MD  Resident  03/25/22 1111       Aly Mclean DO  03/26/22 0949     2

## 2022-03-25 NOTE — NURSING
Patient returned from [facility] post/fall. Arrived via stretcher per medic personnel. Alert & oriented on arrival. Skin warm & dry to touch with mutiple skin tears noted to left lower forearm with mepilex dressing in place. To left outer knee, small abrasion noted, (JUAN). VS obtained and charted in VS flow sheet. To center coccyx, small wound with dime-sized opening noted with mild redness around the serina wound. Cleansed with warm soap & water, dried and applied neomycin ointment to open circular wound and applied triad to serina wound securing with mepilex dressing. Tolerated well. Bilateral heels pink, dry  & intact. Made comfortable & provided with lunch tray. Call light  remains within reach and informed to call for assistance when help is needed.

## 2022-03-26 LAB
POCT GLUCOSE: 259 MG/DL (ref 70–110)
POCT GLUCOSE: 373 MG/DL (ref 70–110)
POCT GLUCOSE: 432 MG/DL (ref 70–110)
POCT GLUCOSE: 53 MG/DL (ref 70–110)
POCT GLUCOSE: 65 MG/DL (ref 70–110)
POCT GLUCOSE: 90 MG/DL (ref 70–110)

## 2022-03-26 PROCEDURE — 25000003 PHARM REV CODE 250: Performed by: HOSPITALIST

## 2022-03-26 PROCEDURE — 25000003 PHARM REV CODE 250: Performed by: NURSE PRACTITIONER

## 2022-03-26 PROCEDURE — 63600175 PHARM REV CODE 636 W HCPCS: Performed by: NURSE PRACTITIONER

## 2022-03-26 PROCEDURE — 11000004 HC SNF PRIVATE

## 2022-03-26 RX ADMIN — Medication 6 MG: at 09:03

## 2022-03-26 RX ADMIN — LACOSAMIDE 100 MG: 50 TABLET, FILM COATED ORAL at 09:03

## 2022-03-26 RX ADMIN — INSULIN ASPART 9 UNITS: 100 INJECTION, SOLUTION INTRAVENOUS; SUBCUTANEOUS at 12:03

## 2022-03-26 RX ADMIN — ASPIRIN 81 MG: 81 TABLET, COATED ORAL at 09:03

## 2022-03-26 RX ADMIN — Medication 500 MG: at 09:03

## 2022-03-26 RX ADMIN — Medication 400 MG: at 09:03

## 2022-03-26 RX ADMIN — SENNOSIDES AND DOCUSATE SODIUM 1 TABLET: 50; 8.6 TABLET ORAL at 09:03

## 2022-03-26 RX ADMIN — INSULIN ASPART 5 UNITS: 100 INJECTION, SOLUTION INTRAVENOUS; SUBCUTANEOUS at 09:03

## 2022-03-26 RX ADMIN — AMIODARONE HYDROCHLORIDE 200 MG: 200 TABLET ORAL at 09:03

## 2022-03-26 RX ADMIN — GABAPENTIN 200 MG: 100 CAPSULE ORAL at 02:03

## 2022-03-26 RX ADMIN — GABAPENTIN 200 MG: 100 CAPSULE ORAL at 09:03

## 2022-03-26 RX ADMIN — ATORVASTATIN CALCIUM 40 MG: 20 TABLET, FILM COATED ORAL at 09:03

## 2022-03-26 RX ADMIN — PANTOPRAZOLE SODIUM 40 MG: 40 TABLET, DELAYED RELEASE ORAL at 09:03

## 2022-03-26 RX ADMIN — SUCRALFATE 1 G: 1 TABLET ORAL at 09:03

## 2022-03-26 RX ADMIN — INSULIN ASPART 9 UNITS: 100 INJECTION, SOLUTION INTRAVENOUS; SUBCUTANEOUS at 08:03

## 2022-03-26 RX ADMIN — THERA TABS 1 TABLET: TAB at 09:03

## 2022-03-26 RX ADMIN — Medication 4 G: at 10:03

## 2022-03-26 RX ADMIN — CETIRIZINE HYDROCHLORIDE 10 MG: 5 TABLET ORAL at 09:03

## 2022-03-26 RX ADMIN — DICLOFENAC SODIUM 4 G: 10 GEL TOPICAL at 02:03

## 2022-03-26 RX ADMIN — BACITRACIN ZINC, NEOMYCIN SULFATE, AND POLYMYXIN B SULFATE: 400; 3.5; 5 OINTMENT TOPICAL at 10:03

## 2022-03-26 RX ADMIN — METFORMIN HYDROCHLORIDE 1000 MG: 500 TABLET ORAL at 09:03

## 2022-03-26 RX ADMIN — SUCRALFATE 1 G: 1 TABLET ORAL at 05:03

## 2022-03-26 RX ADMIN — INSULIN ASPART 9 UNITS: 100 INJECTION, SOLUTION INTRAVENOUS; SUBCUTANEOUS at 05:03

## 2022-03-26 RX ADMIN — BACITRACIN ZINC, NEOMYCIN SULFATE, AND POLYMYXIN B SULFATE: 400; 3.5; 5 OINTMENT TOPICAL at 09:03

## 2022-03-26 RX ADMIN — APIXABAN 5 MG: 2.5 TABLET, FILM COATED ORAL at 09:03

## 2022-03-26 RX ADMIN — DICLOFENAC SODIUM 4 G: 10 GEL TOPICAL at 09:03

## 2022-03-26 RX ADMIN — CLOPIDOGREL 75 MG: 75 TABLET, FILM COATED ORAL at 09:03

## 2022-03-26 RX ADMIN — LOSARTAN POTASSIUM 25 MG: 25 TABLET, FILM COATED ORAL at 09:03

## 2022-03-26 RX ADMIN — INSULIN ASPART 3 UNITS: 100 INJECTION, SOLUTION INTRAVENOUS; SUBCUTANEOUS at 05:03

## 2022-03-26 RX ADMIN — OXYCODONE 5 MG: 5 TABLET ORAL at 09:03

## 2022-03-26 RX ADMIN — SUCRALFATE 1 G: 1 TABLET ORAL at 12:03

## 2022-03-26 RX ADMIN — METFORMIN HYDROCHLORIDE 1000 MG: 500 TABLET ORAL at 05:03

## 2022-03-26 RX ADMIN — INSULIN ASPART 5 UNITS: 100 INJECTION, SOLUTION INTRAVENOUS; SUBCUTANEOUS at 12:03

## 2022-03-26 NOTE — PLAN OF CARE
Problem: Adult Inpatient Plan of Care  Goal: Plan of Care Review  Outcome: Ongoing, Progressing  Goal: Patient-Specific Goal (Individualized)  Outcome: Ongoing, Progressing  Goal: Absence of Hospital-Acquired Illness or Injury  Outcome: Ongoing, Progressing     Problem: Diabetes Comorbidity  Goal: Blood Glucose Level Within Targeted Range  Outcome: Ongoing, Progressing     Problem: Adjustment to Illness (Sepsis/Septic Shock)  Goal: Optimal Coping  Outcome: Ongoing, Progressing     Problem: Impaired Wound Healing  Goal: Optimal Wound Healing  Outcome: Ongoing, Progressing     Problem: Fall Injury Risk  Goal: Absence of Fall and Fall-Related Injury  Outcome: Ongoing, Progressing

## 2022-03-27 LAB
POCT GLUCOSE: 208 MG/DL (ref 70–110)
POCT GLUCOSE: 247 MG/DL (ref 70–110)
POCT GLUCOSE: 290 MG/DL (ref 70–110)
POCT GLUCOSE: 76 MG/DL (ref 70–110)

## 2022-03-27 PROCEDURE — 11000004 HC SNF PRIVATE

## 2022-03-27 PROCEDURE — 25000003 PHARM REV CODE 250: Performed by: NURSE PRACTITIONER

## 2022-03-27 PROCEDURE — 25000003 PHARM REV CODE 250: Performed by: HOSPITALIST

## 2022-03-27 RX ADMIN — LACOSAMIDE 100 MG: 50 TABLET, FILM COATED ORAL at 09:03

## 2022-03-27 RX ADMIN — SUCRALFATE 1 G: 1 TABLET ORAL at 12:03

## 2022-03-27 RX ADMIN — INSULIN ASPART 9 UNITS: 100 INJECTION, SOLUTION INTRAVENOUS; SUBCUTANEOUS at 09:03

## 2022-03-27 RX ADMIN — METFORMIN HYDROCHLORIDE 1000 MG: 500 TABLET ORAL at 09:03

## 2022-03-27 RX ADMIN — Medication 500 MG: at 09:03

## 2022-03-27 RX ADMIN — APIXABAN 5 MG: 2.5 TABLET, FILM COATED ORAL at 09:03

## 2022-03-27 RX ADMIN — GABAPENTIN 200 MG: 100 CAPSULE ORAL at 09:03

## 2022-03-27 RX ADMIN — DICLOFENAC SODIUM 4 G: 10 GEL TOPICAL at 09:03

## 2022-03-27 RX ADMIN — INSULIN ASPART 3 UNITS: 100 INJECTION, SOLUTION INTRAVENOUS; SUBCUTANEOUS at 12:03

## 2022-03-27 RX ADMIN — ASPIRIN 81 MG: 81 TABLET, COATED ORAL at 09:03

## 2022-03-27 RX ADMIN — METFORMIN HYDROCHLORIDE 1000 MG: 500 TABLET ORAL at 05:03

## 2022-03-27 RX ADMIN — INSULIN ASPART 9 UNITS: 100 INJECTION, SOLUTION INTRAVENOUS; SUBCUTANEOUS at 05:03

## 2022-03-27 RX ADMIN — ATORVASTATIN CALCIUM 40 MG: 20 TABLET, FILM COATED ORAL at 09:03

## 2022-03-27 RX ADMIN — PANTOPRAZOLE SODIUM 40 MG: 40 TABLET, DELAYED RELEASE ORAL at 09:03

## 2022-03-27 RX ADMIN — Medication 400 MG: at 09:03

## 2022-03-27 RX ADMIN — INSULIN ASPART 2 UNITS: 100 INJECTION, SOLUTION INTRAVENOUS; SUBCUTANEOUS at 05:03

## 2022-03-27 RX ADMIN — POLYETHYLENE GLYCOL 3350 17 G: 17 POWDER, FOR SOLUTION ORAL at 09:03

## 2022-03-27 RX ADMIN — BACITRACIN ZINC, NEOMYCIN SULFATE, AND POLYMYXIN B SULFATE: 400; 3.5; 5 OINTMENT TOPICAL at 09:03

## 2022-03-27 RX ADMIN — SENNOSIDES AND DOCUSATE SODIUM 1 TABLET: 50; 8.6 TABLET ORAL at 09:03

## 2022-03-27 RX ADMIN — INSULIN ASPART 9 UNITS: 100 INJECTION, SOLUTION INTRAVENOUS; SUBCUTANEOUS at 12:03

## 2022-03-27 RX ADMIN — SUCRALFATE 1 G: 1 TABLET ORAL at 05:03

## 2022-03-27 RX ADMIN — GABAPENTIN 200 MG: 100 CAPSULE ORAL at 02:03

## 2022-03-27 RX ADMIN — LOSARTAN POTASSIUM 25 MG: 25 TABLET, FILM COATED ORAL at 09:03

## 2022-03-27 RX ADMIN — CETIRIZINE HYDROCHLORIDE 10 MG: 5 TABLET ORAL at 09:03

## 2022-03-27 RX ADMIN — THERA TABS 1 TABLET: TAB at 09:03

## 2022-03-27 RX ADMIN — SUCRALFATE 1 G: 1 TABLET ORAL at 09:03

## 2022-03-27 RX ADMIN — AMIODARONE HYDROCHLORIDE 200 MG: 200 TABLET ORAL at 09:03

## 2022-03-27 RX ADMIN — BACITRACIN ZINC, NEOMYCIN SULFATE, AND POLYMYXIN B SULFATE: 400; 3.5; 5 OINTMENT TOPICAL at 10:03

## 2022-03-27 RX ADMIN — Medication 6 MG: at 09:03

## 2022-03-27 RX ADMIN — OXYCODONE 5 MG: 5 TABLET ORAL at 09:03

## 2022-03-27 RX ADMIN — CLOPIDOGREL 75 MG: 75 TABLET, FILM COATED ORAL at 09:03

## 2022-03-27 RX ADMIN — INSULIN ASPART 2 UNITS: 100 INJECTION, SOLUTION INTRAVENOUS; SUBCUTANEOUS at 09:03

## 2022-03-27 NOTE — PROGRESS NOTES
"Pt's bedtime CBG 53, pct gave pt 2 juices before I assessed pt. Pt stated he "feels jittery". No visible signs of hypoglycemia noted. Rechecked patients CBG 65. discussed glucose tablets with patient, patient agreed to take 1 tablet(4g). Pt also ate a no sugar added ice cream. Rechecked patients CBG, 90. Pt stated "I feel a lot better." no visible signs of distress/hypoglycemia noted. Will continue to monitor patient.  "

## 2022-03-28 LAB
ANION GAP SERPL CALC-SCNC: 9 MMOL/L (ref 8–16)
BASOPHILS # BLD AUTO: 0.05 K/UL (ref 0–0.2)
BASOPHILS NFR BLD: 0.7 % (ref 0–1.9)
BUN SERPL-MCNC: 22 MG/DL (ref 8–23)
CALCIUM SERPL-MCNC: 9 MG/DL (ref 8.7–10.5)
CHLORIDE SERPL-SCNC: 101 MMOL/L (ref 95–110)
CO2 SERPL-SCNC: 28 MMOL/L (ref 23–29)
CREAT SERPL-MCNC: 0.9 MG/DL (ref 0.5–1.4)
DIFFERENTIAL METHOD: ABNORMAL
EOSINOPHIL # BLD AUTO: 0.6 K/UL (ref 0–0.5)
EOSINOPHIL NFR BLD: 9.2 % (ref 0–8)
ERYTHROCYTE [DISTWIDTH] IN BLOOD BY AUTOMATED COUNT: 17.9 % (ref 11.5–14.5)
EST. GFR  (AFRICAN AMERICAN): >60 ML/MIN/1.73 M^2
EST. GFR  (NON AFRICAN AMERICAN): >60 ML/MIN/1.73 M^2
GLUCOSE SERPL-MCNC: 123 MG/DL (ref 70–110)
HCT VFR BLD AUTO: 35.3 % (ref 40–54)
HGB BLD-MCNC: 11 G/DL (ref 14–18)
IMM GRANULOCYTES # BLD AUTO: 0.01 K/UL (ref 0–0.04)
IMM GRANULOCYTES NFR BLD AUTO: 0.1 % (ref 0–0.5)
LYMPHOCYTES # BLD AUTO: 2 K/UL (ref 1–4.8)
LYMPHOCYTES NFR BLD: 29 % (ref 18–48)
MAGNESIUM SERPL-MCNC: 1.4 MG/DL (ref 1.6–2.6)
MCH RBC QN AUTO: 28.9 PG (ref 27–31)
MCHC RBC AUTO-ENTMCNC: 31.2 G/DL (ref 32–36)
MCV RBC AUTO: 93 FL (ref 82–98)
MONOCYTES # BLD AUTO: 0.6 K/UL (ref 0.3–1)
MONOCYTES NFR BLD: 9.2 % (ref 4–15)
NEUTROPHILS # BLD AUTO: 3.5 K/UL (ref 1.8–7.7)
NEUTROPHILS NFR BLD: 51.8 % (ref 38–73)
NRBC BLD-RTO: 0 /100 WBC
PHOSPHATE SERPL-MCNC: 3.6 MG/DL (ref 2.7–4.5)
PLATELET # BLD AUTO: 265 K/UL (ref 150–450)
PMV BLD AUTO: 10.2 FL (ref 9.2–12.9)
POCT GLUCOSE: 153 MG/DL (ref 70–110)
POCT GLUCOSE: 176 MG/DL (ref 70–110)
POCT GLUCOSE: 288 MG/DL (ref 70–110)
POCT GLUCOSE: 307 MG/DL (ref 70–110)
POTASSIUM SERPL-SCNC: 4.4 MMOL/L (ref 3.5–5.1)
RBC # BLD AUTO: 3.8 M/UL (ref 4.6–6.2)
SODIUM SERPL-SCNC: 138 MMOL/L (ref 136–145)
WBC # BLD AUTO: 6.75 K/UL (ref 3.9–12.7)

## 2022-03-28 PROCEDURE — 85025 COMPLETE CBC W/AUTO DIFF WBC: CPT | Performed by: HOSPITALIST

## 2022-03-28 PROCEDURE — 80048 BASIC METABOLIC PNL TOTAL CA: CPT | Performed by: HOSPITALIST

## 2022-03-28 PROCEDURE — 84100 ASSAY OF PHOSPHORUS: CPT | Performed by: HOSPITALIST

## 2022-03-28 PROCEDURE — 11000004 HC SNF PRIVATE

## 2022-03-28 PROCEDURE — 97530 THERAPEUTIC ACTIVITIES: CPT

## 2022-03-28 PROCEDURE — 97535 SELF CARE MNGMENT TRAINING: CPT

## 2022-03-28 PROCEDURE — 25000003 PHARM REV CODE 250: Performed by: NURSE PRACTITIONER

## 2022-03-28 PROCEDURE — 36415 COLL VENOUS BLD VENIPUNCTURE: CPT | Performed by: HOSPITALIST

## 2022-03-28 PROCEDURE — 25000003 PHARM REV CODE 250: Performed by: HOSPITALIST

## 2022-03-28 PROCEDURE — 83735 ASSAY OF MAGNESIUM: CPT | Performed by: HOSPITALIST

## 2022-03-28 RX ORDER — DOCUSATE SODIUM 283 MG/5ML
1 LIQUID RECTAL DAILY PRN
Status: DISCONTINUED | OUTPATIENT
Start: 2022-03-28 | End: 2022-04-01 | Stop reason: HOSPADM

## 2022-03-28 RX ORDER — DOCUSATE SODIUM 283 MG/5ML
1 LIQUID RECTAL DAILY
Status: DISCONTINUED | OUTPATIENT
Start: 2022-03-28 | End: 2022-03-28

## 2022-03-28 RX ORDER — LANOLIN ALCOHOL/MO/W.PET/CERES
400 CREAM (GRAM) TOPICAL 3 TIMES DAILY
Status: DISCONTINUED | OUTPATIENT
Start: 2022-03-28 | End: 2022-04-01 | Stop reason: HOSPADM

## 2022-03-28 RX ADMIN — DICLOFENAC SODIUM 4 G: 10 GEL TOPICAL at 02:03

## 2022-03-28 RX ADMIN — OXYCODONE 5 MG: 5 TABLET ORAL at 09:03

## 2022-03-28 RX ADMIN — DOCUSATE SODIUM 1 ENEMA: 283 LIQUID RECTAL at 05:03

## 2022-03-28 RX ADMIN — SUCRALFATE 1 G: 1 TABLET ORAL at 12:03

## 2022-03-28 RX ADMIN — APIXABAN 5 MG: 2.5 TABLET, FILM COATED ORAL at 08:03

## 2022-03-28 RX ADMIN — INSULIN ASPART 3 UNITS: 100 INJECTION, SOLUTION INTRAVENOUS; SUBCUTANEOUS at 04:03

## 2022-03-28 RX ADMIN — THERA TABS 1 TABLET: TAB at 08:03

## 2022-03-28 RX ADMIN — DICLOFENAC SODIUM 4 G: 10 GEL TOPICAL at 09:03

## 2022-03-28 RX ADMIN — Medication 500 MG: at 09:03

## 2022-03-28 RX ADMIN — AMIODARONE HYDROCHLORIDE 200 MG: 200 TABLET ORAL at 08:03

## 2022-03-28 RX ADMIN — BACITRACIN ZINC, NEOMYCIN SULFATE, AND POLYMYXIN B SULFATE: 400; 3.5; 5 OINTMENT TOPICAL at 09:03

## 2022-03-28 RX ADMIN — LACOSAMIDE 100 MG: 50 TABLET, FILM COATED ORAL at 08:03

## 2022-03-28 RX ADMIN — Medication 400 MG: at 09:03

## 2022-03-28 RX ADMIN — GABAPENTIN 200 MG: 100 CAPSULE ORAL at 08:03

## 2022-03-28 RX ADMIN — SENNOSIDES AND DOCUSATE SODIUM 1 TABLET: 50; 8.6 TABLET ORAL at 08:03

## 2022-03-28 RX ADMIN — APIXABAN 5 MG: 2.5 TABLET, FILM COATED ORAL at 09:03

## 2022-03-28 RX ADMIN — SUCRALFATE 1 G: 1 TABLET ORAL at 08:03

## 2022-03-28 RX ADMIN — GABAPENTIN 200 MG: 100 CAPSULE ORAL at 09:03

## 2022-03-28 RX ADMIN — METFORMIN HYDROCHLORIDE 1000 MG: 500 TABLET ORAL at 04:03

## 2022-03-28 RX ADMIN — POLYETHYLENE GLYCOL 3350 17 G: 17 POWDER, FOR SOLUTION ORAL at 08:03

## 2022-03-28 RX ADMIN — INSULIN ASPART 2 UNITS: 100 INJECTION, SOLUTION INTRAVENOUS; SUBCUTANEOUS at 09:03

## 2022-03-28 RX ADMIN — Medication 500 MG: at 08:03

## 2022-03-28 RX ADMIN — INSULIN ASPART 9 UNITS: 100 INJECTION, SOLUTION INTRAVENOUS; SUBCUTANEOUS at 08:03

## 2022-03-28 RX ADMIN — Medication 400 MG: at 02:03

## 2022-03-28 RX ADMIN — PANTOPRAZOLE SODIUM 40 MG: 40 TABLET, DELAYED RELEASE ORAL at 08:03

## 2022-03-28 RX ADMIN — CETIRIZINE HYDROCHLORIDE 10 MG: 5 TABLET ORAL at 09:03

## 2022-03-28 RX ADMIN — SUCRALFATE 1 G: 1 TABLET ORAL at 04:03

## 2022-03-28 RX ADMIN — DICLOFENAC SODIUM 4 G: 10 GEL TOPICAL at 08:03

## 2022-03-28 RX ADMIN — Medication 400 MG: at 08:03

## 2022-03-28 RX ADMIN — BACITRACIN ZINC, NEOMYCIN SULFATE, AND POLYMYXIN B SULFATE: 400; 3.5; 5 OINTMENT TOPICAL at 08:03

## 2022-03-28 RX ADMIN — ATORVASTATIN CALCIUM 40 MG: 20 TABLET, FILM COATED ORAL at 08:03

## 2022-03-28 RX ADMIN — METFORMIN HYDROCHLORIDE 1000 MG: 500 TABLET ORAL at 08:03

## 2022-03-28 RX ADMIN — GABAPENTIN 200 MG: 100 CAPSULE ORAL at 02:03

## 2022-03-28 RX ADMIN — ASPIRIN 81 MG: 81 TABLET, COATED ORAL at 08:03

## 2022-03-28 RX ADMIN — LACOSAMIDE 100 MG: 50 TABLET, FILM COATED ORAL at 09:03

## 2022-03-28 RX ADMIN — CLOPIDOGREL 75 MG: 75 TABLET, FILM COATED ORAL at 08:03

## 2022-03-28 RX ADMIN — LOSARTAN POTASSIUM 25 MG: 25 TABLET, FILM COATED ORAL at 08:03

## 2022-03-28 NOTE — PROGRESS NOTES
[facility] - Skilled Nursing  Adult Nutrition  Progress Note    SUMMARY       Recommendations    1. Continue diabetic 2000 kcal diet with Boost Glucose Control TID and Patric BID.  Goals: PO to meet 85% of EEN/EPN with ONS by next RD follow up  Nutrition Goal Status: goal met  Communication of RD Recs: reviewed with physician    Assessment and Plan  Nutrition Problem:  Severe Protein-Calorie Malnutrition  Malnutrition in the context of Acute Illness/Injury     Related to (etiology):  pysiological demands in the presence of poor intake     Signs and Symptoms (as evidenced by):  Energy Intake: <75% of estimated energy requirement for > 1 month  Body Fat Depletion: mild depletion of orbitals ,moderate to tricep  Muscle Mass Depletion: mild and moderate depletion of temples, clavicle region, scapular region, interosseous muscle and lower extremities   Weight Loss: 20% x 1-2 months from 1/26 209# to 168# on 3/10         Interventions(treatment strategy):  Commercial beverage(amino acids) Patric BID  Commercial beverage( protein and calories) Boost glucose TID chocolate  Collaboration with other providers  Carbohydrate modified diet- 2000 calories   Multivitamin/mineral therapy- Vit D, MVI, Vit C,  Nutrition Education - diabetic diet -3/11       Malnutrition Assessment 3/18/22     Skin (Micronutrient): wounds unhealed, dry, scaly  Teeth (Micronutrient): broken dentition  Neck/Chest (Micronutrient): muscle wasting, subcutaneous fat loss  Musculoskeletal/Lower Extremities: muscle wasting, subcutaneous fat loss        Orbital Region (Subcutaneous Fat Loss): moderate depletion  Upper Arm Region (Subcutaneous Fat Loss): severe depletion  Thoracic and Lumbar Region: moderate depletion   Islam Region (Muscle Loss): mild depletion  Clavicle Bone Region (Muscle Loss): moderate depletion  Clavicle and Acromion Bone Region (Muscle Loss): moderate depletion  Scapular Bone Region (Muscle Loss): moderate depletion  Dorsal Hand (Muscle  "Loss): moderate depletion  Patellar Region (Muscle Loss): moderate depletion  Anterior Thigh Region (Muscle Loss): moderate depletion  Posterior Calf Region (Muscle Loss): mild depletion                 Reason for Assessment    Reason For Assessment: RD follow-up  Diagnosis: infection/sepsis (s/p BRENDAN, DKA,AMS)  Relevant Medical History: COVID, pneumonia,CVA 1/22, AFIB, DM, HTN, HLD, COPD, CAD, seizure, pulmonary masses, R/O malignancy  Interdisciplinary Rounds: attended  General Information Comments: Pt with % intake meals over last week, wt stable. Noted with severe malnutrtion per NFPE 3/11/22 r/t severe wt loss and muscle/fat depletion. Pt continues with stage 3 PIs to coccyx and buttocks, stage 1 to sacrum.  Nutrition Discharge Planning: DC on diabetic diet, diabetic ONS    Nutrition Risk Screen    Nutrition Risk Screen: no indicators present    Nutrition/Diet History    Patient Reported Diet/Restrictions/Preferences: diabetic diet  Spiritual, Cultural Beliefs, Sabianism Practices, Values that Affect Care: no  Food Allergies: NKFA  Factors Affecting Nutritional Intake: decreased appetite    Anthropometrics    Temp: 97.8 °F (36.6 °C)  Height: 6' 2" (188 cm)  Height (inches): 74 in  Weight Method: Standard Scale  Weight: 75 kg (165 lb 5.5 oz)  Weight (lb): 165.35 lb  Ideal Body Weight (IBW), Male: 190 lb  % Ideal Body Weight, Male (lb): 89.35 %  BMI (Calculated): 21.2  BMI Grade: 18.5-24.9 - normal       Lab/Procedures/Meds    Pertinent Labs Reviewed: reviewed  Pertinent Labs Comments: Na 130, Cl 94, Glu 429, A1c 11.5% (1/26/22), Alb 2.6  Pertinent Medications Reviewed: reviewed  Pertinent Medications Comments: vitamin D, gabapentin, detemir, magnesium oxide, Metformin, protonix, MVI, senna, colace, miralax    Estimated/Assessed Needs    Weight Used For Calorie Calculations: 77 kg (169 lb 12.1 oz)  Energy Calorie Requirements (kcal): 1848  Energy Need Method: [place]-St Jeor (x 1.25(PAL))  Protein " Requirements: 92g-116g  Weight Used For Protein Calculations: 77 kg (169 lb 12.1 oz) (x 1.2-1.5 g/kg)  Fluid Requirements (mL): 1948 or per MD  Estimated Fluid Requirement Method: RDA Method  RDA Method (mL): 1848  CHO Requirement: 243g      Nutrition Prescription Ordered    Current Diet Order: 2000 diabetic,  Nutrition Order Comments: % PO  Oral Nutrition Supplement: Boost glucose TID, Patric BID    Evaluation of Received Nutrient/Fluid Intake    I/O: -705  Energy Calories Required: meeting needs  Protein Required: meeting needs  Fluid Required: meeting needs  Comments: last BM 3/22  Tolerance: tolerating  % Intake of Estimated Energy Needs: 75 - 100 %  % Meal Intake: 75 - 100 %    Nutrition Risk    Level of Risk/Frequency of Follow-up: low     Monitor and Evaluation    Food and Nutrient Intake: food and beverage intake  Food and Nutrient Adminstration: diet order  Knowledge/Beliefs/Attitudes: beliefs and attitudes  Anthropometric Measurements: weight  Biochemical Data, Medical Tests and Procedures: glucose/endocrine profile, electrolyte and renal panel, gastrointestinal profile, inflammatory profile  Nutrition-Focused Physical Findings: overall appearance     Nutrition Follow-Up    RD Follow-up?: Yes

## 2022-03-28 NOTE — PT/OT/SLP PROGRESS
Physical Therapy      Patient Name:  Kamar Muñoz   MRN:  [id number]    Patient not seen today. Pt declined PT session due to constipation and wanting to toilet. Nurse was notified.  . Will follow-up per PT POC.    Esther Ratliff, PT  3/28/2022

## 2022-03-28 NOTE — PT/OT/SLP PROGRESS
"Occupational Therapy   Treatment    Name: Kamar Muñoz  MRN: [id number]  Admit Date: 3/10/2022  Admitting Diagnosis:  Encephalopathy, metabolic    General Precautions: Standard, fall   Orthopedic Precautions:N/A   Braces: N/A     Recommendations:     Discharge Recommendations: home health OT  Level of Assistance Recommended at Discharge: 24 hours supervision for safety in performing ADL's and home management tasks  Discharge Equipment Recommendations:  bedside commode, walker, rolling, wheelchair (W/C with 18x16 seat, swing away desk length arm rests and swing away fot rests with heel loops.)  Barriers to discharge:  Decreased caregiver support    Assessment:     Kamar Muñoz is a 76 y.o. male with a medical diagnosis of Encephalopathy, metabolic.  He presents with performance deficits affecting function are weakness, impaired endurance, impaired self care skills, impaired functional mobilty, gait instability, impaired balance, decreased upper extremity function, decreased lower extremity function, decreased safety awareness, pain, impaired cardiopulmonary response to activity.   Pt tolerated session well with good participation throughout. Pt continues to demonstrate safety concerns when completing functional transfers and standing self care tasks. Pt also demonstrating impaired functional endurance requiring rest breaks to complete task on this day. Pt would benefit from continued skilled OT services to maximize performance and safety during ADLs.     Rehab Potential is fair    Activity tolerance:  Fair    Plan:     Patient to be seen 6 x/week to address the above listed problems via self-care/home management, therapeutic activities, therapeutic exercises    · Plan of Care Expires: 04/11/22  · Plan of Care Reviewed with: patient    Subjective   'You know I fell the other day."   Communicated with: NAEEM prior to session.     Pain/Comfort:  · Pain Rating 1: 7/10  · Location - Side 1: Bilateral  · Location - " Orientation 1: lower  · Location 1: back  · Pain Addressed 1: Reposition, Distraction  · Pain Rating Post-Intervention 1: 7/10    Patient's cultural, spiritual, Bahai conflicts given the current situation:  · no    Objective:     Patient found up in chair with  (no lines) upon OT entry to room.    Functional Mobility/Transfers:  · Patient completed Sit <> Stand Transfer with contact guard assistance  with  rolling walker   · Patient completed 2 trials of Toilet Transfer, Trial 1: Stand Pivot technique with contact guard assistance with  grab bars, Trial 2: Step Transfer technique with RW and CGA.   · Functional Mobility: Pt completed functional mobility to in room bathroom using RW with CGA and w/c following, pt required cueing for RW management.     Activities of Daily Living:  · Grooming: modified independence to brush teeth, wash face, and comb hair seated in w/c at sink   · Toiletin trials of toileting completed on this day: each trial minimum assistance to manage pants over hips in standing, pt required verbal cues for improved balance and safety aspects when standing to manage clothing, pt complete serina care seated on toilet with no assist     [facility] 6 Click ADL: 19  Functional Activities:  Pt performed standing trials at raised tabletop to complete FM task for improved standing tolerance during self care.  Trial 1: 4 min. w/ CGA assistance  Trial 2: 2 min. w/ CGA assistance   Trial 3: 2 min. w/ CGA assistance   Pt required seated rest breaks in between trials due to fatigue.   Pt completed w/c mobility 50 ft with verbal cues for w/c management.     Treatment & Education:  Pt educated on:   - POC/OT role   - benefit of performing OOB activity   - safety when performing self care tasks, functional transfers, and functional mobility  - proper use of AD   - proper body mechanics & posture   Pt receptive to education providing verbal understanding on this day.     Patient left on commode with call button in  reachEducation:  and PT present in room attempting next tx session     GOALS:   Multidisciplinary Problems     Occupational Therapy Goals        Problem: Occupational Therapy Goal    Goal Priority Disciplines Outcome Interventions   Occupational Therapy Goal     OT, PT/OT Ongoing, Progressing    Description: Goals to be met by: 4/1/22    Patient will increase functional independence with ADLs by performing:    UE Dressing with Modified [place].  LE Dressing with Set-up Assistance.  Grooming while seated at sink with Modified [place]. MET  Toileting from toilet or BSC with Supervision for hygiene and clothing management. Ongoing   Bathing from sitting on shower seat with Supervision.  Supine to sit with Modified Cast[place].  Stand pivot transfers with Modified [place] using A/D as needed.. Ongoing   Toilet transfer to toilet or BSC with Modified [place] using A/D as needed  Upper extremity exercise using UBE with mod resistance for 10 minutes to increased functional   endurance to perform functional tasks and mobility.     Ongoing  Caregiver will be educated on level of assist required to safely perform self care tasks and functional transfers..                     Time Tracking:     OT Date of Treatment: OT Date of Treatment: 03/28/22  OT Total Time (min): Total Time (min): 44 min    Billable Minutes:Self Care/Home Management 34  Therapeutic Activity 10    3/28/2022

## 2022-03-28 NOTE — PLAN OF CARE
Problem: Malnutrition  Goal: Improved Nutritional Intake  Outcome: Ongoing, Progressing   Recommendations     1. Continue diabetic 2000 kcal diet with Boost Glucose Control TID and Patric BID.  Goals: PO to meet 85% of EEN/EPN with ONS by next RD follow up  Nutrition Goal Status: goal met  Communication of RD Recs: reviewed with physician     Assessment and Plan  Nutrition Problem:  Severe Protein-Calorie Malnutrition  Malnutrition in the context of Acute Illness/Injury     Related to (etiology):  pysiological demands in the presence of poor intake     Signs and Symptoms (as evidenced by):  Energy Intake: <75% of estimated energy requirement for > 1 month  Body Fat Depletion: mild depletion of orbitals ,moderate to tricep  Muscle Mass Depletion: mild and moderate depletion of temples, clavicle region, scapular region, interosseous muscle and lower extremities   Weight Loss: 20% x 1-2 months from 1/26 209# to 168# on 3/10         Interventions(treatment strategy):  Commercial beverage(amino acids) Patric BID  Commercial beverage( protein and calories) Boost glucose TID chocolate  Collaboration with other providers  Carbohydrate modified diet- 2000 calories   Multivitamin/mineral therapy- Vit D, MVI, Vit C,  Nutrition Education - diabetic diet -3/11

## 2022-03-28 NOTE — PROGRESS NOTES
[facility]                                  Skilled Nursing Facility                   Progress Note     Admit Date: 3/10/2022  ALLIE 3/25/2022  Principal Problem:  Encephalopathy, metabolic   HPI obtained from patient interview and chart review     Chief Complaint: Re-evaluation of medical treatment and therapy status: Lab review, Dm management , constipation     HPI:   Kamar Muñoz is a 76 year old male with PMHx of CAD s/p 2 LAUREN in RCA (Jan 2022), HTN, HLD, p. A. 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  Admission to SNF for secondary weakness debility, continued insulin adjustments     Interval history:  All of today's labs reviewed and are listed below.  24 hr vital sign ranges listed below.  24 hour blood glucose range is .  Had an hypoglycemic event last night where the nurses had to initiate hypoglycemic protocol. Will make adjustments to insulin today.  Patient states that he has not had a BM in 5 days. Bowel medications have shantal given with no relief. Will order enema today.    Patient denies shortness of breath, nausea, or vomiting.  Patient reports an adequate appetite.  Patient denies dysuria.  Continuing to follow and treat all acute and chronic conditions.    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    Past Surgical History: Patient has a past surgical history that includes Back surgery; Eye surgery;  Shoulder open rotator cuff repair; renal stones; Hernia repair; Colonoscopy (N/A, 1/28/2019); Cataract extraction w/  intraocular lens implant (Right); and Left heart catheterization (Left, 1/9/2022).    Social History: Patient reports that he quit smoking about 39 years ago. He has a 37.50 pack-year smoking history. He has never used smokeless tobacco. He reports that he does not drink alcohol and does not use drugs.    Family History:  No significant family history to report.    Allergies: Patient is allergic to iodine.    ROS  Constitutional: Negative for fever.  +appetite change   Eyes: Negative for blurred vision, double vision   Respiratory: Negative for shortness of breath, cough  Cardiovascular: Negative for chest pain, palpitations, and leg swelling.   Gastrointestinal: Negative for abdominal pain ,diarrhea, nausea, vomiting. + constipation  Genitourinary: Negative for dysuria, frequency   Musculoskeletal:  + generalized weakness. Negative for back pain and myalgias.   Skin: Negative for itching and rash.   Neurological: Negative for dizziness, headaches.   Psychiatric/Behavioral: Negative for depression. The patient is not nervous/anxious.      24 hour Vital Sign Range   Temp:  [98.2 °F (36.8 °C)]   Pulse:  [85]   Resp:  [18]   BP: (125-132)/(63-72)   SpO2:  [94 %]     PEx  Constitutional: Patient appears debilitated.  No distress noted  HENT:   Head: Normocephalic and atraumatic.   Eyes: Pupils are equal, round  Neck: Normal range of motion. Neck supple.   Cardiovascular: Normal rate, regular rhythm and normal heart sounds.    Pulmonary/Chest: Effort normal and breath sounds are clear  Abdominal: Soft. Bowel sounds are normal.   Musculoskeletal: Normal range of motion.   Neurological: Alert and oriented to person, place, and time.   Psychiatric: Normal mood and affect. Behavior is normal.   Skin: Skin is warm and dry.  Wound that you did not assess today:  Wound location(s)/type(s):  Sacrum  Not  visualized today. No signs/symptoms of infection or wound-related changes reported.         No results for input(s): GLUCOSE, NA, K, CL, CO2, BUN, CREATININE, MG in the last 24 hours.    Invalid input(s):  CALCIUM    No results for input(s): WBC, RBC, HGB, HCT, PLT, MCV, MCH, MCHC in the last 24 hours.      Assessment and Plan:       Type 2 diabetes mellitus with hyperglycemia, with long-term current use of insulin  - Per C, Patient with uncontrolled DM presenting with metabolic encephalopathy in the setting of DKA with coma. Suspect patient developed DKA in the setting of sepsis/ COVID-19- DKA protocol completed and DKA resolved in MICU and Pt stepped down on subq insulin.  Endocrine followed   - Diabetic diet, Accu-Cheks AC/HS  - home regimen with Levemir 20 units daily, NovoLog 9 units TID WM, metformin 1000 mg BID  - 3/28-Initiate decrease in levemir to 6 units and DC scheduled meal time insulin 2/2 hypoglycemia     Constipation, new   -3/28- Initiate order for enema PRN daily for constipation     Nasal congestion  - continue Zyrtec 10 mg qHS., Flonase daily    COVID-19 virus infection  Viral Pneumonia due to COVID-19  - COVID vaccinated with booster.   - Received 1 dose of sotrovimab infusion 02/18/2022  - Completed 5 days of remdesivir, had dexamethasone held initially due to DKA.  - Stable on room air  - End isolation on 3/9/2022  - continues to have mild productive cough   - PRN DuoNebs     Hypertension associated with diabetes  - home regimen with losartan 25mg, Toprol xl 50mg, diltiazem 120mg at home  - continue losartan 25mg daily     Paroxysmal atrial fibrillation  - home Metoprolol XL 50 mg daily, diltiazem  mg daily and amiodarone 200 mg daily, apixaban 5mg BID  - continue amiodarone 20 mg daily, apixaban 5 mg BID. Holding metoprolol due to hypotension/bradycardia, holding diltiazem due to hypotension     Coronary artery disease involving native coronary artery of native heart with unstable  angina pectoris  HLD  - Hx of CAD s/p placement of 2 LAUREN in RCA in 01/09/2022.   - Continue home ASA, Plavix and statin  - Chest pain 3/4 after breakfast; EKG nonischemic. PPI, Tums, carafate added     Pulmonary nodules  - Has stable bilateral pulmonary nodules/masses with broad differential per outpatient pulmonology notes. No pathology report as none of the nodules appeared to be safe for biopsy given high risk of PTX is high with many adjacent bulla. Plan working on promoting functional independence for the time being with possible addressing of nodules in the future, which is highly dependent upon his overall functionality.     Chronic pulmonary heart disease  - Follows up with Pulmonary clinic in [place]. Last seen in December and was evaluated for pulmonary nodules. Deemed to have mild COPD per notes. Not on any maintenance therapy.  - Continue albuterol inhaler   - likely needs follow-up with Pulmonary after discharge from [facility]     Embolic stroke involving right middle cerebral artery  - Hx of CVA in Jan 2022.   - CT Head with evolving infarcts in right frontal and left cerebellar hemispheres.   - Has had issues with memory per family since CVA   - No concern for acute stroke this admit per discussion with Radiology.   - Continue home antiplatelets, statin and Eliquis     Focal seizures  - Continue home lancosamide      Pressure injury of buttock, unstageable  - Seen by wound care with [facility] started  - Continues to cause patient discomfort  - initiate wound care consult at [facility]     Vitamin-D deficiency  - continue ergocalciferol 1000 units weekly     Hypomagnesemia  - magnesium oxide 400 mg BID   -3/28- Initiate order to increase mag oxide 400 mg TID for mg of 1.4     Peripheral neuropathy  - continue gabapentin 200 mg TID     Malnutrition of moderate degree  - RD following, appreciate recommendations, continue supplements as ordered     Debility   - Continue with PT/OT for gait training and strengthening and  restoration of ADL's   - Encourage mobility, OOB in chair, and early ambulation as appropriate  - Fall precautions   - Monitor for bowel and bladder dysfunction  - Monitor for and prevent skin breakdown and pressure ulcers  - Continue DVT prophylaxis with apixaban        Anticipate disposition:  Home with home health        Follow-up needed during SNF stay-     Appointment not to send patient to- Dr. Kumar (3/21)     Follow-up needed after discharge from SNF:      - PCP - 4/6  - pulmonary- needs to be scheduled- post COVID/COPD   - Endocrinology- needs to be scheduled- diabetes mellitus type 2       Future Appointments   Date Time Provider Department Center   4/6/2022  1:00 PM Basim Guerrero MD [facility]       Yara Luna NP  Department of Hospital Medicine   [facility]     DOS: 3/28/2022       Patient note was created using MModal Dictation.  Any errors in syntax or even information may not have been identified and edited on initial review prior to signing this note.

## 2022-03-28 NOTE — PLAN OF CARE
Problem: Occupational Therapy Goal  Goal: Occupational Therapy Goal  Description: Goals to be met by: 4/1/22    Patient will increase functional independence with ADLs by performing:    UE Dressing with Modified [place].  LE Dressing with Set-up Assistance.  Grooming while seated at sink with Modified [place]. MET  Toileting from toilet or BSC with Supervision for hygiene and clothing management. Ongoing   Bathing from sitting on shower seat with Supervision.  Supine to sit with Modified [place].  Stand pivot transfers with Modified [place] using A/D as needed.. Ongoing   Toilet transfer to toilet or BSC with Modified Deputy using A/D as needed  Upper extremity exercise using UBE with mod resistance for 10 minutes to increased functional   endurance to perform functional tasks and mobility.     Ongoing  Caregiver will be educated on level of assist required to safely perform self care tasks and functional transfers..    Outcome: Ongoing, Progressing

## 2022-03-28 NOTE — PLAN OF CARE
Interdisciplinary team, Cindy Melissa, RN Nurse Manager, Jenny Obando, RN Charge Nurse, Zana Blankenship, Unit Director, Deepti Soto, RN MDS Coordinator, Yara Hurst PT, Rehab Supervisor, Johanne Quevedo, Social Work/Case Management, Farheen Garcia, Saritha, and Salina Yates, Dietician, spoke to patient for care plan conference, weekly status update, and therapy progress update. Discharge date set for 4/1/22.

## 2022-03-29 LAB
POCT GLUCOSE: 157 MG/DL (ref 70–110)
POCT GLUCOSE: 234 MG/DL (ref 70–110)
POCT GLUCOSE: 255 MG/DL (ref 70–110)
POCT GLUCOSE: 373 MG/DL (ref 70–110)

## 2022-03-29 PROCEDURE — 97116 GAIT TRAINING THERAPY: CPT | Mod: CQ

## 2022-03-29 PROCEDURE — 11000004 HC SNF PRIVATE

## 2022-03-29 PROCEDURE — 25000003 PHARM REV CODE 250: Performed by: NURSE PRACTITIONER

## 2022-03-29 PROCEDURE — 63600175 PHARM REV CODE 636 W HCPCS: Performed by: NURSE PRACTITIONER

## 2022-03-29 PROCEDURE — 25000003 PHARM REV CODE 250: Performed by: HOSPITALIST

## 2022-03-29 PROCEDURE — 97110 THERAPEUTIC EXERCISES: CPT | Mod: CQ

## 2022-03-29 PROCEDURE — 97530 THERAPEUTIC ACTIVITIES: CPT | Mod: CQ

## 2022-03-29 PROCEDURE — 97535 SELF CARE MNGMENT TRAINING: CPT | Mod: CO

## 2022-03-29 RX ORDER — INSULIN ASPART 100 [IU]/ML
3 INJECTION, SOLUTION INTRAVENOUS; SUBCUTANEOUS
Status: DISCONTINUED | OUTPATIENT
Start: 2022-03-29 | End: 2022-03-31

## 2022-03-29 RX ADMIN — SUCRALFATE 1 G: 1 TABLET ORAL at 05:03

## 2022-03-29 RX ADMIN — INSULIN ASPART 2 UNITS: 100 INJECTION, SOLUTION INTRAVENOUS; SUBCUTANEOUS at 05:03

## 2022-03-29 RX ADMIN — ATORVASTATIN CALCIUM 40 MG: 20 TABLET, FILM COATED ORAL at 08:03

## 2022-03-29 RX ADMIN — Medication 400 MG: at 08:03

## 2022-03-29 RX ADMIN — OXYCODONE 5 MG: 5 TABLET ORAL at 03:03

## 2022-03-29 RX ADMIN — ASPIRIN 81 MG: 81 TABLET, COATED ORAL at 08:03

## 2022-03-29 RX ADMIN — LOSARTAN POTASSIUM 25 MG: 25 TABLET, FILM COATED ORAL at 08:03

## 2022-03-29 RX ADMIN — INSULIN ASPART 3 UNITS: 100 INJECTION, SOLUTION INTRAVENOUS; SUBCUTANEOUS at 08:03

## 2022-03-29 RX ADMIN — AMIODARONE HYDROCHLORIDE 200 MG: 200 TABLET ORAL at 08:03

## 2022-03-29 RX ADMIN — INSULIN ASPART 5 UNITS: 100 INJECTION, SOLUTION INTRAVENOUS; SUBCUTANEOUS at 12:03

## 2022-03-29 RX ADMIN — METFORMIN HYDROCHLORIDE 1000 MG: 500 TABLET ORAL at 05:03

## 2022-03-29 RX ADMIN — DICLOFENAC SODIUM 4 G: 10 GEL TOPICAL at 08:03

## 2022-03-29 RX ADMIN — GABAPENTIN 200 MG: 100 CAPSULE ORAL at 08:03

## 2022-03-29 RX ADMIN — APIXABAN 5 MG: 2.5 TABLET, FILM COATED ORAL at 08:03

## 2022-03-29 RX ADMIN — THERA TABS 1 TABLET: TAB at 08:03

## 2022-03-29 RX ADMIN — CETIRIZINE HYDROCHLORIDE 10 MG: 5 TABLET ORAL at 09:03

## 2022-03-29 RX ADMIN — APIXABAN 5 MG: 2.5 TABLET, FILM COATED ORAL at 09:03

## 2022-03-29 RX ADMIN — BACITRACIN ZINC, NEOMYCIN SULFATE, AND POLYMYXIN B SULFATE: 400; 3.5; 5 OINTMENT TOPICAL at 09:03

## 2022-03-29 RX ADMIN — DICLOFENAC SODIUM 4 G: 10 GEL TOPICAL at 03:03

## 2022-03-29 RX ADMIN — Medication 500 MG: at 09:03

## 2022-03-29 RX ADMIN — LACOSAMIDE 100 MG: 50 TABLET, FILM COATED ORAL at 08:03

## 2022-03-29 RX ADMIN — LACOSAMIDE 100 MG: 50 TABLET, FILM COATED ORAL at 09:03

## 2022-03-29 RX ADMIN — CLOPIDOGREL 75 MG: 75 TABLET, FILM COATED ORAL at 08:03

## 2022-03-29 RX ADMIN — Medication 400 MG: at 09:03

## 2022-03-29 RX ADMIN — GABAPENTIN 200 MG: 100 CAPSULE ORAL at 09:03

## 2022-03-29 RX ADMIN — POLYETHYLENE GLYCOL 3350 17 G: 17 POWDER, FOR SOLUTION ORAL at 08:03

## 2022-03-29 RX ADMIN — BACITRACIN ZINC, NEOMYCIN SULFATE, AND POLYMYXIN B SULFATE: 400; 3.5; 5 OINTMENT TOPICAL at 08:03

## 2022-03-29 RX ADMIN — SENNOSIDES AND DOCUSATE SODIUM 1 TABLET: 50; 8.6 TABLET ORAL at 08:03

## 2022-03-29 RX ADMIN — INSULIN ASPART 3 UNITS: 100 INJECTION, SOLUTION INTRAVENOUS; SUBCUTANEOUS at 05:03

## 2022-03-29 RX ADMIN — SUCRALFATE 1 G: 1 TABLET ORAL at 08:03

## 2022-03-29 RX ADMIN — SUCRALFATE 1 G: 1 TABLET ORAL at 12:03

## 2022-03-29 RX ADMIN — SENNOSIDES AND DOCUSATE SODIUM 1 TABLET: 50; 8.6 TABLET ORAL at 09:03

## 2022-03-29 RX ADMIN — Medication 400 MG: at 03:03

## 2022-03-29 RX ADMIN — Medication 500 MG: at 08:03

## 2022-03-29 RX ADMIN — METFORMIN HYDROCHLORIDE 1000 MG: 500 TABLET ORAL at 08:03

## 2022-03-29 RX ADMIN — Medication 6 MG: at 09:03

## 2022-03-29 RX ADMIN — GABAPENTIN 200 MG: 100 CAPSULE ORAL at 03:03

## 2022-03-29 RX ADMIN — PANTOPRAZOLE SODIUM 40 MG: 40 TABLET, DELAYED RELEASE ORAL at 08:03

## 2022-03-29 RX ADMIN — DICLOFENAC SODIUM 4 G: 10 GEL TOPICAL at 09:03

## 2022-03-29 NOTE — PT/OT/SLP PROGRESS
Physical Therapy Treatment    Patient Name:  Kamar Muñoz   MRN:  [id number]  Admit Date: 3/10/2022  Admitting Diagnosis: Encephalopathy, metabolic    General Precautions: Standard, fall   Orthopedic Precautions:N/A   Braces: N/A     Recommendations:     Discharge Recommendations:  home health PT   Level of Assistance Recommended at Discharge: Intermittent assistance   Discharge Equipment Recommendations: bedside commode, walker, rolling, wheelchair   Barriers to discharge: Decreased caregiver support    Assessment:     Kamar Muñoz is a 76 y.o. male admitted with a medical diagnosis of Encephalopathy, metabolic. Pt tolerated therapy session well. Treatment focused on gait training, stair/curb training with RW, cardio pulmonary endurance and transfer training to assure pt can achieve maximum independence upon d/c from PT. Pt would continue to benefit from PT per POC.       Performance deficits affecting function:  weakness, impaired endurance, impaired self care skills, gait instability, impaired functional mobilty .    Rehab Potential is good    Activity Tolerance: Good    Plan:     Patient to be seen 6 x/week to address the above listed problems via gait training, therapeutic activities, therapeutic exercises, wheelchair management/training    · Plan of Care Expires: 04/10/22  · Plan of Care Reviewed with: patient    Subjective     Pt agreeable to PT after he eats his breakfast.     Pain/Comfort:  · Pain Rating 1: 2/10  · Location - Side 1: Bilateral  · Location - Orientation 1: lower  · Location 1: back  · Pain Addressed 1: Pre-medicate for activity, Cessation of Activity  · Pain Rating Post-Intervention 1: 8/10    Patient's cultural, spiritual, Anglican conflicts given the current situation:  · no    Objective:     Communicated with nursing prior to session.  Patient found up in chair with  (no lines) upon PT entry to room.     Therapeutic Activities and Exercises: nustep x 15 mins at mod resistance  "with brief rest breaks and adjustments made to alleviate tailbone discomfort.     Functional Mobility:  · Bed Mobility:     · Sit to Supine: Mod I with use of guard rail  · Transfers:     · Sit to Stand: x 2 sets, stand by assistance and contact guard assistance with rolling walker. Reminders required on brakes/sequencing.  · Bed to Chair: x 2 sets, stand by assistance with  no AD  using  Stand Pivot. W/c set up assistance with vc's.  · Gait: x 127', RW, SBA with vc's/visual cues on widening NICOLE and staying close to AD in order to improve pt's posture.   · Stairs:  Pt ascended/descended 4 stair(s) with B handrails and 4" curb step with Rolling Walker with Contact Guard Assistance.   · Wheelchair Propulsion:  Pt propelled Standard wheelchair x 40 feet on Level tile with  Bilateral upper extremity and Bilateral lower extremity with Modified Independent.     AM-PAC 6 CLICK MOBILITY  20    Patient left supine with call button in reach.    GOALS:   Multidisciplinary Problems     Physical Therapy Goals        Problem: Physical Therapy Goal    Goal Priority Disciplines Outcome Goal Variances Interventions   Physical Therapy Goal     PT, PT/OT Ongoing, Progressing     Description: Goals to be met by: 3/25/22    Patient will increase functional independence with mobility by performing:    . Supine to sit with [place]-met 3/24/22  . Sit to supine with [place]-met 3/24/22  . Rolling to Left and Right with [place].  . Sit to stand transfer with Supervision-met 3/24/22  . Bed to chair transfer with Supervision using No Assistive Device  . Gait  x 150 feet with Supervision using Rolling Walker.   . Wheelchair propulsion x150 feet with Supervision using bilateral uppper extremities  . Ascend/descend 4 stair with bilateral Handrails Contact Guard Assistance using No Assistive Device.   . Ascend/Descend 4 inch curb step with Contact Guard Assistance using Rolling Walker. - Met 03/19  . Retrieve object off floor in " standing with RW and reacher and SBA.- Met 03/19                     Time Tracking:     PT Received On: 03/29/22  PT Total Time (min):       Billable Minutes: Gait Training 12, Therapeutic Activity 18 and Therapeutic Exercise 15    Treatment Type: Treatment  PT/PTA: PTA     PTA Visit Number: 1     03/29/2022

## 2022-03-29 NOTE — PLAN OF CARE
03/29/22    Medicare Message   Important Message from Medicare regarding Discharge Appeal Rights Given to patient/caregiver;Explained to patient/caregiver;Signed/date by patient/caregiver;Other (comments)  (NOMNC)   Date IMM was signed 03/29/22   Time IMM was signed 10:50am     SW met with pt, discussed upcoming dc on Friday and provided pt with NOMNC, explaining appeal process. Copy given to pt, and original placed in chart.    Leidy Watts LCSW  PRN

## 2022-03-29 NOTE — PROGRESS NOTES
[facility] - Skilled Nursing  Wound Care    Patient Name:  Kamar Muñoz   MRN:  [id number]  Date: 3/29/2022  Diagnosis: Encephalopathy, metabolic    History:     Past Medical History:   Diagnosis Date    Arthritis     Coronary artery disease     Diabetes mellitus type II     Hyperlipidemia     Hypertension     Kidney stone     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 2012    STEMI involving right coronary artery 2022    Type II or unspecified type diabetes mellitus with neurological manifestations, uncontrolled(250.62) 3/8/2013    Urinary tract infection        Social History     Socioeconomic History    Marital status:    Tobacco Use    Smoking status: Former Smoker     Packs/day: 1.50     Years: 25.00     Pack years: 37.50     Quit date: 1983     Years since quittin.2    Smokeless tobacco: Never Used   Substance and Sexual Activity    Alcohol use: No    Drug use: No    Sexual activity: Yes     Partners: Female   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     Social Determinants of Health     Financial Resource Strain: Low Risk     Difficulty of Paying Living Expenses: Not hard at all   Food Insecurity: No Food Insecurity    Worried About Running Out of Food in the Last Year: Never true    Ran Out of Food in the Last Year: Never true   Transportation Needs: No Transportation Needs    Lack of Transportation (Medical): No    Lack of Transportation (Non-Medical): No   Housing Stability: Low Risk     Unable to Pay for Housing in the Last Year: No    Number of Places Lived in the Last Year: 1    Unstable Housing in the Last Year: No       Precautions:     Allergies as of 03/10/2022 - Reviewed 03/10/2022   Allergen Reaction Noted    Iodine  2012       [facility] Assessment Details/Treatment   Wound care follow-up  Mr. Muñoz is sitting up in a wheelchair at bedside, states the buttocks feel better.  He feel last week in the restroom- ecchymotic skin  changes to the left buttock observed- intact skin.    Plan:  Sacral spine/coccyx- Triad ointment BID/prn   Neosporin ointment BID.     Nursing to continue care, pressure prevention measures  Wound care will follow-up prn   Recommendations made to primary team for above plan per written report . Orders placed.        03/29/22 0800        Altered Skin Integrity 03/10/22 1710 upper Coccyx Ulceration Full thickness tissue loss. Subcutaneous fat may be visible but bone, tendon or muscle are not exposed   Date First Assessed/Time First Assessed: 03/10/22 1710   Altered Skin Integrity Present on Admission: yes  Orientation: upper  Location: Coccyx  Primary Wound Type: Ulceration  Description of Altered Skin Integrity: Full thickness tissue loss. Subcuta...   Wound Image    Description of Altered Skin Integrity Partial thickness tissue loss. Shallow open ulcer with a red or pink wound bed, without slough. Intact or Open/Ruptured Serum-filled blister.   Dressing Appearance Open to air   Drainage Amount None   Appearance Pink;Moist   Tissue loss description Partial thickness   Periwound Area Intact;Dry;Ecchymotic;Redness   Wound Edges Open   Wound Length (cm) 0.5 cm   Wound Width (cm) 0.5 cm   Wound Depth (cm) 0.2 cm   Wound Volume (cm^3) 0.05 cm^3   Wound Surface Area (cm^2) 0.25 cm^2   Care Cleansed with:;Soap and water;Applied:;Skin Barrier        Altered Skin Integrity 01/26/22 0841 Sacral spine Partial thickness tissue loss. Shallow open ulcer with a red or pink wound bed, without slough. Intact or Open/Ruptured Serum-filled blister.   Date First Assessed/Time First Assessed: 01/26/22 0841   Altered Skin Integrity Present on Admission: yes  Location: Sacral spine  Description of Altered Skin Integrity: Partial thickness tissue loss. Shallow open ulcer with a red or pink wound bed, w...   Dressing Appearance Open to air   Drainage Amount None   Appearance Pink;Dry   Tissue loss description Partial thickness   Periwound  Area Intact;Dry   Wound Edges Open   Wound Length (cm) 0.5 cm   Wound Width (cm) 0.5 cm   Wound Depth (cm) 0.2 cm   Wound Volume (cm^3) 0.05 cm^3   Wound Surface Area (cm^2) 0.25 cm^2   Care Cleansed with:;Applied:;Skin Barrier       03/29/2022

## 2022-03-29 NOTE — PT/OT/SLP PROGRESS
Occupational Therapy   Treatment    Name: Kamar Muñoz  MRN: [id number]  Admit Date: 3/10/2022  Admitting Diagnosis:  Encephalopathy, metabolic    General Precautions: Standard, fall   Orthopedic Precautions:N/A   Braces:       Recommendations:     Discharge Recommendations: home health OT  Level of Assistance Recommended at Discharge: 24 hours supervision for safety in performing ADL's and home management tasks  Discharge Equipment Recommendations:  bedside commode, walker, rolling, wheelchair (W/C with 18x16 seat, swing away desk length arm rests and swing away fot rests with heel loops.)  Barriers to discharge:  Decreased caregiver support    Assessment:     Kamar Muñoz is a 76 y.o. male with a medical diagnosis of Encephalopathy, metabolic.  He presents with  Performance deficits affecting function are weakness, impaired endurance, impaired self care skills, impaired functional mobilty, gait instability, impaired balance, decreased coordination, decreased upper extremity function, pain, decreased lower extremity function, impaired cardiopulmonary response to activity.   Pt. Was cooperative and participated well with session on this day. Pt  continues to demonstrate levels of physical deficits with  functional indep with daily management activities tasks, selfcare skills with balance,  functional mobility, UB strength and endurance. Pt. Will continue to benefit from continued OT to progress towards goals     Rehab Potential is fair    Activity tolerance:  Fair    Plan:     Patient to be seen 6 x/week to address the above listed problems via self-care/home management, therapeutic activities, therapeutic exercises    · Plan of Care Expires: 04/11/22  · Plan of Care Reviewed with: patient    Subjective     Communicated with: nsg and Pt prior to session. You need me I ready    Pain/Comfort:  · Pain Rating 1: 3/10  · Location - Side 1: Bilateral  · Location - Orientation 1: lower  · Location 1: back  · Pain  Addressed 1: Pre-medicate for activity, Reposition, Distraction    Patient's cultural, spiritual, Restoration conflicts given the current situation:  · no    Objective:     Patient found supine with upon OT entry to room.    Bed Mobility:    · Patient completed Supine to Sit with supervision  · Patient completed Sit to Supine with supervision     Functional Mobility/Transfers:  · Patient completed Sit <> Stand Transfer with stand by assistance and contact guard assistance  with  rolling walker   · Patient completed Bed <> Chair Transfer using Stand Pivot technique with stand by assistance and contact guard assistance with rolling walker  · Patient completed Toilet Transfer Stand Pivot technique with stand by assistance and contact guard assistance with  rolling walker    Activities of Daily Living:  · Grooming: stand by assistance seated at sink level  · Toileting: contact guard assistance with cleaning and clothing management from 3n1 level over toilet    [facility] 6 Click ADL: 19    Treatment & Education:    Pt edu on role of OT, POC, safety when performing self care tasks , benefit of performing OOB activity, and safety when performing functional transfers and mobility management for preparation with goals to progress towards next level of care    Patient left supine with all lines intact and call button in reachEducation:      GOALS:   Multidisciplinary Problems     Occupational Therapy Goals        Problem: Occupational Therapy Goal    Goal Priority Disciplines Outcome Interventions   Occupational Therapy Goal     OT, PT/OT Ongoing, Progressing    Description: Goals to be met by: 4/1/22    Patient will increase functional independence with ADLs by performing:    UE Dressing with Modified [place].  LE Dressing with Set-up Assistance.  Grooming while seated at sink with Modified [place]. MET  Toileting from toilet or BSC with Supervision for hygiene and clothing management. Ongoing   Bathing from sitting on  shower seat with Supervision.  Supine to sit with Modified [place].  Stand pivot transfers with Modified [place] using A/D as needed.. Ongoing   Toilet transfer to toilet or BSC with Modified [place] using A/D as needed  Upper extremity exercise using UBE with mod resistance for 10 minutes to increased functional   endurance to perform functional tasks and mobility.     Ongoing  Caregiver will be educated on level of assist required to safely perform self care tasks and functional transfers..                     Time Tracking:     OT Date of Treatment: OT Date of Treatment: 03/29/22  OT Total Time (min):      Billable Minutes:Self Care/Home Management 30    3/29/2022

## 2022-03-29 NOTE — PROGRESS NOTES
[facility]                                  Skilled Nursing Facility                   Progress Note     Admit Date: 3/10/2022  ALLIE 4/1/2022  Principal Problem:  Encephalopathy, metabolic   HPI obtained from patient interview and chart review     Chief Complaint:  Hyperglycemia    HPI:   Kamar Muñoz is a 76 year old male with PMHx of CAD s/p 2 LAUREN in RCA (Jan 2022), HTN, HLD, p. A. 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  Admission to SNF for secondary weakness debility, continued insulin adjustments     Interval history:  24 hour blood glucose range is 176-373.  Patient continues to have very difficult to control blood glucose levels.  No recent episodes of hypoglycemia.    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    Past Surgical History: Patient has a past surgical history that includes Back surgery; Eye surgery; Shoulder open rotator cuff repair; renal stones; Hernia repair; Colonoscopy (N/A, 1/28/2019); Cataract extraction w/  intraocular lens implant (Right); and Left heart catheterization (Left, 1/9/2022).    Social History: Patient reports that he quit smoking about 39 years ago. He has a 37.50 pack-year smoking history. He has never used smokeless tobacco. He reports that he does not drink alcohol and does not use drugs.    Family History:  No significant family history to report.    Allergies: Patient is  allergic to iodine.    ROS  Constitutional: Negative for fever.  +appetite change   Eyes: Negative for blurred vision, double vision   Respiratory: Negative for shortness of breath, cough  Cardiovascular: Negative for chest pain, palpitations, and leg swelling.   Gastrointestinal: Negative for abdominal pain ,diarrhea, nausea, vomiting. + constipation  Genitourinary: Negative for dysuria, frequency   Musculoskeletal:  + generalized weakness. Negative for back pain and myalgias.   Skin: Negative for itching and rash.   Neurological: Negative for dizziness, headaches.   Psychiatric/Behavioral: Negative for depression. The patient is not nervous/anxious.      24 hour Vital Sign Range   Temp:  [97.3 °F (36.3 °C)-99.3 °F (37.4 °C)]   Pulse:  [78-80]   Resp:  [18]   BP: (133-137)/(65-69)   SpO2:  [95 %]     PEx  Constitutional: Patient appears debilitated.  No distress noted  HENT:   Head: Normocephalic and atraumatic.   Eyes: Pupils are equal, round  Neck: Normal range of motion. Neck supple.   Cardiovascular: Normal rate, regular rhythm and normal heart sounds.    Pulmonary/Chest: Effort normal and breath sounds are clear  Abdominal: Soft. Bowel sounds are normal.   Musculoskeletal: Normal range of motion.   Neurological: Alert and oriented to person, place, and time.   Psychiatric: Normal mood and affect. Behavior is normal.   Skin: Skin is warm and dry.  Wound that you did not assess today:  Wound location(s)/type(s):  Sacrum  Not visualized today. No signs/symptoms of infection or wound-related changes reported.         No results for input(s): GLUCOSE, NA, K, CL, CO2, BUN, CREATININE, MG in the last 24 hours.    Invalid input(s):  CALCIUM    No results for input(s): WBC, RBC, HGB, HCT, PLT, MCV, MCH, MCHC in the last 24 hours.      Assessment and Plan:       Type 2 diabetes mellitus with hyperglycemia, with long-term current use of insulin  - Per [facility], Patient with uncontrolled DM presenting with metabolic  encephalopathy in the setting of DKA with coma. Suspect patient developed DKA in the setting of sepsis/ COVID-19- DKA protocol completed and DKA resolved in MICU and Pt stepped down on subq insulin.  Endocrine followed   - Diabetic diet, Accu-Cheks AC/HS  - home regimen with Levemir 20 units daily, NovoLog 9 units TID WM, metformin 1000 mg BID  - 3/29 initiated detemir 6 units daily, aspart 3 units TID WM, continue metformin 1000 mg BID    Constipation  -continue  enema PRN daily for constipation     Nasal congestion  - continue Zyrtec 10 mg qHS., Flonase daily    COVID-19 virus infection  Viral Pneumonia due to COVID-19  - COVID vaccinated with booster.   - Received 1 dose of sotrovimab infusion 02/18/2022  - Completed 5 days of remdesivir, had dexamethasone held initially due to DKA.  - Stable on room air  - End isolation on 3/9/2022  - continues to have mild productive cough   - PRN DuoNebs     Hypertension associated with diabetes  - home regimen with losartan 25mg, Toprol xl 50mg, diltiazem 120mg at home  - continue losartan 25mg daily     Paroxysmal atrial fibrillation  - home Metoprolol XL 50 mg daily, diltiazem  mg daily and amiodarone 200 mg daily, apixaban 5mg BID  - continue amiodarone 20 mg daily, apixaban 5 mg BID. Holding metoprolol due to hypotension/bradycardia, holding diltiazem due to hypotension     Coronary artery disease involving native coronary artery of native heart with unstable angina pectoris  HLD  - Hx of CAD s/p placement of 2 LAUREN in RCA in 01/09/2022.   - Continue home ASA, Plavix and statin  - Chest pain 3/4 after breakfast; EKG nonischemic. PPI, Tums, carafate added     Pulmonary nodules  - Has stable bilateral pulmonary nodules/masses with broad differential per outpatient pulmonology notes. No pathology report as none of the nodules appeared to be safe for biopsy given high risk of PTX is high with many adjacent bulla. Plan working on promoting functional independence for the  time being with possible addressing of nodules in the future, which is highly dependent upon his overall functionality.     Chronic pulmonary heart disease  - Follows up with Pulmonary clinic in [place]. Last seen in December and was evaluated for pulmonary nodules. Deemed to have mild COPD per notes. Not on any maintenance therapy.  - Continue albuterol inhaler   - likely needs follow-up with Pulmonary after discharge from SNF     Embolic stroke involving right middle cerebral artery  - Hx of CVA in Jan 2022.   - CT Head with evolving infarcts in right frontal and left cerebellar hemispheres.   - Has had issues with memory per family since CVA   - No concern for acute stroke this admit per discussion with Radiology.   - Continue home antiplatelets, statin and Eliquis     Focal seizures  - Continue home lancosamide      Pressure injury of buttock, unstageable  - Seen by wound care with Triad started  - Continues to cause patient discomfort  - initiate wound care consult at [facility]     Vitamin-D deficiency  - continue ergocalciferol 1000 units weekly     Hypomagnesemia  - magnesium oxide 400 mg BID   -3/28- Initiate order to increase mag oxide 400 mg TID for mg of 1.4     Peripheral neuropathy  - continue gabapentin 200 mg TID     Malnutrition of moderate degree  - RD following, appreciate recommendations, continue supplements as ordered     Debility   - Continue with PT/OT for gait training and strengthening and restoration of ADL's   - Encourage mobility, OOB in chair, and early ambulation as appropriate  - Fall precautions   - Monitor for bowel and bladder dysfunction  - Monitor for and prevent skin breakdown and pressure ulcers  - Continue DVT prophylaxis with apixaban        Anticipate disposition:  Home with home health        Follow-up needed during SNF stay-     Appointment not to send patient to- Dr. Kumar (3/21)     Follow-up needed after discharge from SNF:      - PCP - 4/6  - pulmonary- needs to be scheduled-  post COVID/COPD   - Endocrinology- needs to be scheduled- diabetes mellitus type 2       Future Appointments   Date Time Provider Department Center   4/6/2022  1:00 PM Basim Guerrero MD [facility]       Jazmín Zambrano NP  Department of Hospital Medicine   [facility]     DOS: 3/29/2022       Patient note was created using MModal Dictation.  Any errors in syntax or even information may not have been identified and edited on initial review prior to signing this note.

## 2022-03-30 LAB
POCT GLUCOSE: 261 MG/DL (ref 70–110)
POCT GLUCOSE: 285 MG/DL (ref 70–110)
POCT GLUCOSE: 384 MG/DL (ref 70–110)
POCT GLUCOSE: 417 MG/DL (ref 70–110)

## 2022-03-30 PROCEDURE — 97110 THERAPEUTIC EXERCISES: CPT | Mod: CO

## 2022-03-30 PROCEDURE — 11000004 HC SNF PRIVATE

## 2022-03-30 PROCEDURE — 97535 SELF CARE MNGMENT TRAINING: CPT | Mod: CO

## 2022-03-30 PROCEDURE — 97530 THERAPEUTIC ACTIVITIES: CPT | Mod: CQ

## 2022-03-30 PROCEDURE — 25000003 PHARM REV CODE 250: Performed by: NURSE PRACTITIONER

## 2022-03-30 PROCEDURE — 97110 THERAPEUTIC EXERCISES: CPT | Mod: CQ

## 2022-03-30 PROCEDURE — 25000003 PHARM REV CODE 250: Performed by: HOSPITALIST

## 2022-03-30 RX ADMIN — INSULIN ASPART 3 UNITS: 100 INJECTION, SOLUTION INTRAVENOUS; SUBCUTANEOUS at 08:03

## 2022-03-30 RX ADMIN — OXYCODONE 5 MG: 5 TABLET ORAL at 09:03

## 2022-03-30 RX ADMIN — GABAPENTIN 200 MG: 100 CAPSULE ORAL at 09:03

## 2022-03-30 RX ADMIN — CLOPIDOGREL 75 MG: 75 TABLET, FILM COATED ORAL at 09:03

## 2022-03-30 RX ADMIN — INSULIN ASPART 5 UNITS: 100 INJECTION, SOLUTION INTRAVENOUS; SUBCUTANEOUS at 11:03

## 2022-03-30 RX ADMIN — OXYCODONE 5 MG: 5 TABLET ORAL at 03:03

## 2022-03-30 RX ADMIN — LOSARTAN POTASSIUM 25 MG: 25 TABLET, FILM COATED ORAL at 09:03

## 2022-03-30 RX ADMIN — METFORMIN HYDROCHLORIDE 1000 MG: 500 TABLET ORAL at 04:03

## 2022-03-30 RX ADMIN — Medication 400 MG: at 02:03

## 2022-03-30 RX ADMIN — Medication 400 MG: at 09:03

## 2022-03-30 RX ADMIN — ASPIRIN 81 MG: 81 TABLET, COATED ORAL at 09:03

## 2022-03-30 RX ADMIN — BACITRACIN ZINC, NEOMYCIN SULFATE, AND POLYMYXIN B SULFATE: 400; 3.5; 5 OINTMENT TOPICAL at 09:03

## 2022-03-30 RX ADMIN — INSULIN ASPART 3 UNITS: 100 INJECTION, SOLUTION INTRAVENOUS; SUBCUTANEOUS at 11:03

## 2022-03-30 RX ADMIN — GABAPENTIN 200 MG: 100 CAPSULE ORAL at 02:03

## 2022-03-30 RX ADMIN — LACOSAMIDE 100 MG: 50 TABLET, FILM COATED ORAL at 09:03

## 2022-03-30 RX ADMIN — ATORVASTATIN CALCIUM 40 MG: 20 TABLET, FILM COATED ORAL at 09:03

## 2022-03-30 RX ADMIN — SUCRALFATE 1 G: 1 TABLET ORAL at 04:03

## 2022-03-30 RX ADMIN — DICLOFENAC SODIUM 4 G: 10 GEL TOPICAL at 09:03

## 2022-03-30 RX ADMIN — SUCRALFATE 1 G: 1 TABLET ORAL at 08:03

## 2022-03-30 RX ADMIN — PANTOPRAZOLE SODIUM 40 MG: 40 TABLET, DELAYED RELEASE ORAL at 09:03

## 2022-03-30 RX ADMIN — POLYETHYLENE GLYCOL 3350 17 G: 17 POWDER, FOR SOLUTION ORAL at 09:03

## 2022-03-30 RX ADMIN — INSULIN ASPART 3 UNITS: 100 INJECTION, SOLUTION INTRAVENOUS; SUBCUTANEOUS at 04:03

## 2022-03-30 RX ADMIN — APIXABAN 5 MG: 2.5 TABLET, FILM COATED ORAL at 09:03

## 2022-03-30 RX ADMIN — METFORMIN HYDROCHLORIDE 1000 MG: 500 TABLET ORAL at 08:03

## 2022-03-30 RX ADMIN — AMIODARONE HYDROCHLORIDE 200 MG: 200 TABLET ORAL at 09:03

## 2022-03-30 RX ADMIN — Medication 500 MG: at 09:03

## 2022-03-30 RX ADMIN — SUCRALFATE 1 G: 1 TABLET ORAL at 11:03

## 2022-03-30 RX ADMIN — DICLOFENAC SODIUM 4 G: 10 GEL TOPICAL at 02:03

## 2022-03-30 RX ADMIN — THERA TABS 1 TABLET: TAB at 09:03

## 2022-03-30 RX ADMIN — CETIRIZINE HYDROCHLORIDE 10 MG: 5 TABLET ORAL at 09:03

## 2022-03-30 RX ADMIN — SENNOSIDES AND DOCUSATE SODIUM 1 TABLET: 50; 8.6 TABLET ORAL at 09:03

## 2022-03-30 RX ADMIN — INSULIN ASPART 5 UNITS: 100 INJECTION, SOLUTION INTRAVENOUS; SUBCUTANEOUS at 04:03

## 2022-03-30 NOTE — PT/OT/SLP PROGRESS
Physical Therapy Treatment    Patient Name:  Kamar Muñoz   MRN:  [id number]  Admit Date: 3/10/2022  Admitting Diagnosis: Encephalopathy, metabolic    General Precautions: Standard, fall   Orthopedic Precautions:N/A   Braces: N/A     Recommendations:     Discharge Recommendations:  home health PT   Level of Assistance Recommended at Discharge: Intermittent assistance   Discharge Equipment Recommendations: bedside commode, walker, rolling, wheelchair   Barriers to discharge: Decreased caregiver support    Assessment:     Kamar Muñoz is a 76 y.o. male admitted with a medical diagnosis of Encephalopathy, metabolic. Pt tolerated therapy session well. Focus on LE strengthening, cardiopulmonary endurance, transfer training and safety awareness. Pt needed to use bathroom at beginning of session, focus on toileting and in room activities. Pt would continue to benefit from skilled PT services to assist with achieving highest level of independence per PT POC.       Performance deficits affecting function:  weakness, impaired endurance, impaired self care skills, gait instability, impaired functional mobilty .    Rehab Potential is good    Activity Tolerance: Good    Plan:     Patient to be seen 6 x/week to address the above listed problems via gait training, therapeutic activities, therapeutic exercises, wheelchair management/training    · Plan of Care Expires: 04/10/22  · Plan of Care Reviewed with: patient    Subjective     Pt agreeable to PT, just needs to finish breakfast. Second attempt, pt ready for PT but needs to use bathroom.      Pain/Comfort:  · Pain Rating 1: 2/10  · Location - Side 1: Bilateral  · Location - Orientation 1: lower  · Location 1: back  · Pain Addressed 1: Pre-medicate for activity, Distraction  · Pain Rating Post-Intervention 1: 2/10    Patient's cultural, spiritual, Religion conflicts given the current situation:  · no    Objective:     Communicated with nursing prior to session.  Patient  found up in chair with  (no lines) upon PT entry to room.     Therapeutic Activities and Exercises: mini elliptical at mod resistance x 15 mins, without rest to increase pt's LE strength and cardio endurance.     Functional Mobility:  · Bed Mobility:   · Supine to R sidelying: Mod I with guard rail    · Sit to Supine: modified independence with use of guard rail  · Transfers:    · W/c to bed: stand by assistance with guard rail, stand pivot t/f and good recall on locking brakes.   · Toilet Transfer: stand by assistance with  grab bars  using  Step Transfer  · Balance: static stand bal: x 1 min 10 secs, SBA with UE support on grab bar.   · Extra time required due to pt toileting, nursing present to give pt meds, PTA searching for SW due to question for d/c from patient.      AM-PAC 6 CLICK MOBILITY  20    Patient left right sidelying with call button in reach.    GOALS:   Multidisciplinary Problems     Physical Therapy Goals        Problem: Physical Therapy Goal    Goal Priority Disciplines Outcome Goal Variances Interventions   Physical Therapy Goal     PT, PT/OT Ongoing, Progressing     Description: Goals to be met by: 3/25/22    Patient will increase functional independence with mobility by performing:    . Supine to sit with Pershing-met 3/24/22  . Sit to supine with Pershing-met 3/24/22  . Rolling to Left and Right with Pershing.  . Sit to stand transfer with Supervision-met 3/24/22  . Bed to chair transfer with Supervision using No Assistive Device  . Gait  x 150 feet with Supervision using Rolling Walker.   . Wheelchair propulsion x150 feet with Supervision using bilateral uppper extremities  . Ascend/descend 4 stair with bilateral Handrails Contact Guard Assistance using No Assistive Device.   . Ascend/Descend 4 inch curb step with Contact Guard Assistance using Rolling Walker. - Met 03/19  . Retrieve object off floor in standing with RW and reacher and SBA.- Met 03/19                     Time  Tracking:     PT Received On: 03/30/22  PT Total Time (min):       Billable Minutes: Therapeutic Activity 21 and Therapeutic Exercise 15    Treatment Type: Treatment  PT/PTA: PTA     PTA Visit Number: 2     03/30/2022

## 2022-03-30 NOTE — PLAN OF CARE
Problem: Adult Inpatient Plan of Care  Goal: Plan of Care Review  Outcome: Ongoing, Progressing  Goal: Patient-Specific Goal (Individualized)  Outcome: Ongoing, Progressing     Problem: Diabetes Comorbidity  Goal: Blood Glucose Level Within Targeted Range  Outcome: Ongoing, Progressing     Problem: Infection Progression (Sepsis/Septic Shock)  Goal: Absence of Infection Signs and Symptoms  Outcome: Ongoing, Progressing     Problem: Fall Injury Risk  Goal: Absence of Fall and Fall-Related Injury  Outcome: Ongoing, Progressing     Problem: Impaired Wound Healing  Goal: Optimal Wound Healing  Outcome: Ongoing, Progressing     Problem: Skin Injury Risk Increased  Goal: Skin Health and Integrity  Outcome: Ongoing, Progressing

## 2022-03-30 NOTE — PT/OT/SLP PROGRESS
Occupational Therapy   Treatment    Name: Kamar Muñoz  MRN: [id number]  Admit Date: 3/10/2022  Admitting Diagnosis:  Encephalopathy, metabolic    General Precautions: Standard, fall   Orthopedic Precautions:N/A   Braces:       Recommendations:     Discharge Recommendations: home health OT  Level of Assistance Recommended at Discharge: 24 hours supervision for safety in performing ADL's and home management tasks  Discharge Equipment Recommendations:  bedside commode, walker, rolling, wheelchair (W/C with 18x16 seat, swing away desk length arm rests and swing away fot rests with heel loops.)  Barriers to discharge:  Decreased caregiver support    Assessment:     Kamar Muñoz is a 76 y.o. male with a medical diagnosis of Encephalopathy, metabolic.  He presents with  Performance deficits affecting function are weakness, impaired endurance, impaired self care skills, impaired functional mobilty, gait instability, impaired balance, decreased coordination, decreased upper extremity function, pain, decreased lower extremity function, impaired cardiopulmonary response to activity.   Pt. Was cooperative and participated well with session on this day. Pt  continues to demonstrate levels of physical deficits with  functional indep with daily management activities tasks, selfcare skills with balance,  functional mobility, UB strength and endurance. Pt. Will continue to benefit from continued OT to progress towards goals     Rehab Potential is fair    Activity tolerance:  Fair    Plan:     Patient to be seen 6 x/week to address the above listed problems via self-care/home management, therapeutic activities, therapeutic exercises    · Plan of Care Expires: 04/11/22  · Plan of Care Reviewed with: patient    Subjective     Communicated with: nsg and Pt prior to session. You need me I ready    Pain/Comfort:  Pain Rating 1: 4/10  Location - Side 1: Bilateral  Location - Orientation 1: lower  Location 1: back  Pain Addressed  1: Pre-medicate for activity, Reposition, Distraction    Patient's cultural, spiritual, Holiness conflicts given the current situation:  no    Objective:     Patient found up in chair with upon OT entry to room.    Functional Mobility/Transfers:  · Patient completed Sit <> Stand Transfer with stand by assistance and contact guard assistance  with  rolling walker   · Patient completed Bed <> Chair Transfer using Stand Pivot technique with stand by assistance and contact guard assistance with rolling walker  · Patient completed Toilet Transfer Stand Pivot technique with stand by assistance and contact guard assistance with  rolling walker    Activities of Daily Living:  · Grooming: stand by assistance seated at sink level  · Upper Dressing: stand by assistance to doff/pema fresh shirt  · Lower body dressing contact guard assist to pema pants seated and to manage over hips instance with RW for bal  · Toileting: contact guard assistance with cleaning and clothing management from 3n1 level over toilet    [facility] 6 Click ADL: 19    Treatment & Education:  Pt. With 2# dowel activity with 2x20 reps with  shd flex, bicep curls horz adb/add and forward flex motion to increase BUE ROM and strength.    Pt. With therex performed to increase ROM, endurance selfcare task and fxl mobility for independence     Pt edu on role of OT, POC, safety when performing self care tasks , benefit of performing OOB activity, and safety when performing functional transfers and mobility management for preparation with goals to progress towards next level of care    Patient left up in chair with all lines intact and call button in reachEducation:      GOALS:   Multidisciplinary Problems     Occupational Therapy Goals        Problem: Occupational Therapy Goal    Goal Priority Disciplines Outcome Interventions   Occupational Therapy Goal     OT, PT/OT Ongoing, Progressing    Description: Goals to be met by: 4/1/22    Patient will increase functional  independence with ADLs by performing:    UE Dressing with Modified Portia.  LE Dressing with Set-up Assistance.  Grooming while seated at sink with Modified Portia. MET  Toileting from toilet or BSC with Supervision for hygiene and clothing management. Ongoing   Bathing from sitting on shower seat with Supervision.  Supine to sit with Modified Portia.  Stand pivot transfers with Modified Portia using A/D as needed.. Ongoing   Toilet transfer to toilet or BSC with Modified Portia using A/D as needed  Upper extremity exercise using UBE with mod resistance for 10 minutes to increased functional   endurance to perform functional tasks and mobility.     Ongoing  Caregiver will be educated on level of assist required to safely perform self care tasks and functional transfers..                     Time Tracking:     OT Date of Treatment: OT Date of Treatment: 03/30/22  OT Total Time (min):      Billable Minutes:Self Care/Home Management 25  Therapeutic Activity 13    3/30/2022

## 2022-03-30 NOTE — PLAN OF CARE
Request for hospital bed sent to Jazmín Zambrano NP for order. Insurance auth requested through [facility].   Patient accepted by [facility].    [facility] [place] [place]  Case Management Department  [facility]  [email or web address]

## 2022-03-31 LAB
ANION GAP SERPL CALC-SCNC: 9 MMOL/L (ref 8–16)
BASOPHILS # BLD AUTO: 0.08 K/UL (ref 0–0.2)
BASOPHILS NFR BLD: 1.2 % (ref 0–1.9)
BUN SERPL-MCNC: 19 MG/DL (ref 8–23)
CALCIUM SERPL-MCNC: 9 MG/DL (ref 8.7–10.5)
CHLORIDE SERPL-SCNC: 96 MMOL/L (ref 95–110)
CO2 SERPL-SCNC: 28 MMOL/L (ref 23–29)
CREAT SERPL-MCNC: 1 MG/DL (ref 0.5–1.4)
DIFFERENTIAL METHOD: ABNORMAL
EOSINOPHIL # BLD AUTO: 0.5 K/UL (ref 0–0.5)
EOSINOPHIL NFR BLD: 7.1 % (ref 0–8)
ERYTHROCYTE [DISTWIDTH] IN BLOOD BY AUTOMATED COUNT: 17.2 % (ref 11.5–14.5)
EST. GFR  (AFRICAN AMERICAN): >60 ML/MIN/1.73 M^2
EST. GFR  (NON AFRICAN AMERICAN): >60 ML/MIN/1.73 M^2
GLUCOSE SERPL-MCNC: 341 MG/DL (ref 70–110)
HCT VFR BLD AUTO: 36.6 % (ref 40–54)
HGB BLD-MCNC: 11.8 G/DL (ref 14–18)
IMM GRANULOCYTES # BLD AUTO: 0.02 K/UL (ref 0–0.04)
IMM GRANULOCYTES NFR BLD AUTO: 0.3 % (ref 0–0.5)
LYMPHOCYTES # BLD AUTO: 2 K/UL (ref 1–4.8)
LYMPHOCYTES NFR BLD: 30.6 % (ref 18–48)
MAGNESIUM SERPL-MCNC: 1.5 MG/DL (ref 1.6–2.6)
MCH RBC QN AUTO: 29.4 PG (ref 27–31)
MCHC RBC AUTO-ENTMCNC: 32.2 G/DL (ref 32–36)
MCV RBC AUTO: 91 FL (ref 82–98)
MONOCYTES # BLD AUTO: 0.6 K/UL (ref 0.3–1)
MONOCYTES NFR BLD: 9.1 % (ref 4–15)
NEUTROPHILS # BLD AUTO: 3.4 K/UL (ref 1.8–7.7)
NEUTROPHILS NFR BLD: 51.7 % (ref 38–73)
NRBC BLD-RTO: 0 /100 WBC
PHOSPHATE SERPL-MCNC: 3.2 MG/DL (ref 2.7–4.5)
PLATELET # BLD AUTO: 251 K/UL (ref 150–450)
PMV BLD AUTO: 10.4 FL (ref 9.2–12.9)
POCT GLUCOSE: 201 MG/DL (ref 70–110)
POCT GLUCOSE: 234 MG/DL (ref 70–110)
POCT GLUCOSE: 316 MG/DL (ref 70–110)
POCT GLUCOSE: 338 MG/DL (ref 70–110)
POTASSIUM SERPL-SCNC: 4.2 MMOL/L (ref 3.5–5.1)
RBC # BLD AUTO: 4.02 M/UL (ref 4.6–6.2)
SODIUM SERPL-SCNC: 133 MMOL/L (ref 136–145)
WBC # BLD AUTO: 6.6 K/UL (ref 3.9–12.7)

## 2022-03-31 PROCEDURE — 84100 ASSAY OF PHOSPHORUS: CPT | Performed by: HOSPITALIST

## 2022-03-31 PROCEDURE — 25000003 PHARM REV CODE 250: Performed by: NURSE PRACTITIONER

## 2022-03-31 PROCEDURE — 83735 ASSAY OF MAGNESIUM: CPT | Performed by: HOSPITALIST

## 2022-03-31 PROCEDURE — 97535 SELF CARE MNGMENT TRAINING: CPT | Mod: CO

## 2022-03-31 PROCEDURE — 36415 COLL VENOUS BLD VENIPUNCTURE: CPT | Performed by: HOSPITALIST

## 2022-03-31 PROCEDURE — 97110 THERAPEUTIC EXERCISES: CPT

## 2022-03-31 PROCEDURE — 80048 BASIC METABOLIC PNL TOTAL CA: CPT | Performed by: HOSPITALIST

## 2022-03-31 PROCEDURE — 97116 GAIT TRAINING THERAPY: CPT

## 2022-03-31 PROCEDURE — 25000003 PHARM REV CODE 250: Performed by: HOSPITALIST

## 2022-03-31 PROCEDURE — 11000004 HC SNF PRIVATE

## 2022-03-31 PROCEDURE — 85025 COMPLETE CBC W/AUTO DIFF WBC: CPT | Performed by: HOSPITALIST

## 2022-03-31 RX ORDER — INSULIN ASPART 100 [IU]/ML
6 INJECTION, SOLUTION INTRAVENOUS; SUBCUTANEOUS
Status: DISCONTINUED | OUTPATIENT
Start: 2022-03-31 | End: 2022-04-01 | Stop reason: HOSPADM

## 2022-03-31 RX ORDER — INSULIN ASPART 100 [IU]/ML
6 INJECTION, SOLUTION INTRAVENOUS; SUBCUTANEOUS 3 TIMES DAILY
Qty: 9 ML | Refills: 3 | Status: ON HOLD | OUTPATIENT
Start: 2022-03-31 | End: 2022-04-10 | Stop reason: SDUPTHER

## 2022-03-31 RX ADMIN — LACOSAMIDE 100 MG: 50 TABLET, FILM COATED ORAL at 09:03

## 2022-03-31 RX ADMIN — LACOSAMIDE 100 MG: 50 TABLET, FILM COATED ORAL at 08:03

## 2022-03-31 RX ADMIN — BACITRACIN ZINC, NEOMYCIN SULFATE, AND POLYMYXIN B SULFATE: 400; 3.5; 5 OINTMENT TOPICAL at 09:03

## 2022-03-31 RX ADMIN — ASPIRIN 81 MG: 81 TABLET, COATED ORAL at 08:03

## 2022-03-31 RX ADMIN — OXYCODONE 5 MG: 5 TABLET ORAL at 09:03

## 2022-03-31 RX ADMIN — Medication 400 MG: at 09:03

## 2022-03-31 RX ADMIN — LOSARTAN POTASSIUM 25 MG: 25 TABLET, FILM COATED ORAL at 08:03

## 2022-03-31 RX ADMIN — INSULIN ASPART 3 UNITS: 100 INJECTION, SOLUTION INTRAVENOUS; SUBCUTANEOUS at 08:03

## 2022-03-31 RX ADMIN — CETIRIZINE HYDROCHLORIDE 10 MG: 5 TABLET ORAL at 09:03

## 2022-03-31 RX ADMIN — SUCRALFATE 1 G: 1 TABLET ORAL at 12:03

## 2022-03-31 RX ADMIN — INSULIN ASPART 6 UNITS: 100 INJECTION, SOLUTION INTRAVENOUS; SUBCUTANEOUS at 12:03

## 2022-03-31 RX ADMIN — Medication 400 MG: at 03:03

## 2022-03-31 RX ADMIN — CLOPIDOGREL 75 MG: 75 TABLET, FILM COATED ORAL at 08:03

## 2022-03-31 RX ADMIN — METFORMIN HYDROCHLORIDE 1000 MG: 500 TABLET ORAL at 05:03

## 2022-03-31 RX ADMIN — Medication 400 MG: at 08:03

## 2022-03-31 RX ADMIN — INSULIN ASPART 4 UNITS: 100 INJECTION, SOLUTION INTRAVENOUS; SUBCUTANEOUS at 12:03

## 2022-03-31 RX ADMIN — METFORMIN HYDROCHLORIDE 1000 MG: 500 TABLET ORAL at 08:03

## 2022-03-31 RX ADMIN — BACITRACIN ZINC, NEOMYCIN SULFATE, AND POLYMYXIN B SULFATE: 400; 3.5; 5 OINTMENT TOPICAL at 08:03

## 2022-03-31 RX ADMIN — THERA TABS 1 TABLET: TAB at 08:03

## 2022-03-31 RX ADMIN — GABAPENTIN 200 MG: 100 CAPSULE ORAL at 09:03

## 2022-03-31 RX ADMIN — INSULIN ASPART 4 UNITS: 100 INJECTION, SOLUTION INTRAVENOUS; SUBCUTANEOUS at 08:03

## 2022-03-31 RX ADMIN — SENNOSIDES AND DOCUSATE SODIUM 1 TABLET: 50; 8.6 TABLET ORAL at 08:03

## 2022-03-31 RX ADMIN — AMIODARONE HYDROCHLORIDE 200 MG: 200 TABLET ORAL at 08:03

## 2022-03-31 RX ADMIN — SUCRALFATE 1 G: 1 TABLET ORAL at 05:03

## 2022-03-31 RX ADMIN — APIXABAN 5 MG: 2.5 TABLET, FILM COATED ORAL at 09:03

## 2022-03-31 RX ADMIN — DICLOFENAC SODIUM 4 G: 10 GEL TOPICAL at 08:03

## 2022-03-31 RX ADMIN — Medication 500 MG: at 09:03

## 2022-03-31 RX ADMIN — GABAPENTIN 200 MG: 100 CAPSULE ORAL at 03:03

## 2022-03-31 RX ADMIN — ATORVASTATIN CALCIUM 40 MG: 20 TABLET, FILM COATED ORAL at 08:03

## 2022-03-31 RX ADMIN — PANTOPRAZOLE SODIUM 40 MG: 40 TABLET, DELAYED RELEASE ORAL at 08:03

## 2022-03-31 RX ADMIN — INSULIN ASPART 1 UNITS: 100 INJECTION, SOLUTION INTRAVENOUS; SUBCUTANEOUS at 09:03

## 2022-03-31 RX ADMIN — INSULIN ASPART 6 UNITS: 100 INJECTION, SOLUTION INTRAVENOUS; SUBCUTANEOUS at 04:03

## 2022-03-31 RX ADMIN — GABAPENTIN 200 MG: 100 CAPSULE ORAL at 08:03

## 2022-03-31 RX ADMIN — DICLOFENAC SODIUM 4 G: 10 GEL TOPICAL at 04:03

## 2022-03-31 RX ADMIN — SUCRALFATE 1 G: 1 TABLET ORAL at 08:03

## 2022-03-31 RX ADMIN — APIXABAN 5 MG: 2.5 TABLET, FILM COATED ORAL at 08:03

## 2022-03-31 RX ADMIN — Medication 500 MG: at 08:03

## 2022-03-31 RX ADMIN — DICLOFENAC SODIUM 4 G: 10 GEL TOPICAL at 09:03

## 2022-03-31 NOTE — PLAN OF CARE
SW met with pt and spoke to daughter, Basia Herrera, to confirm discharge. No DME needed. Patient's daughter will be transporting pt to his home. Patient is accepted by [facility].  D/C set for 10:45 am.    [facility]  [place] Management Department  [facility]  [email or web address]

## 2022-03-31 NOTE — PROGRESS NOTES
[facility]                                  Skilled Nursing Facility                   Progress Note     Admit Date: 3/10/2022  ALLIE 4/1/2022  Principal Problem:  Encephalopathy, metabolic   HPI obtained from patient interview and chart review     Chief Complaint:  Re-evaluation of medical treatment and therapy status: Lab review,Hyperglycemia    HPI:   Kamar Muñoz is a 76 year old male with PMHx of CAD s/p 2 LAUREN in RCA (Jan 2022), HTN, HLD, p. A. 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  Admission to SNF for secondary weakness debility, continued insulin adjustments     Interval history:  All of today's labs reviewed and are listed below.  24 hr vital sign ranges listed below.  24 hour blood glucose range is 261-417.  No further episodes of falling.  Patient denies shortness of breath, abdominal discomfort, nausea, or vomiting.  Patient reports an adequate appetite.  Patient denies dysuria.  Patient reports having regular bowel movements.  Patient progessing with PT/OT- .Gait: 180ft then 150ft with Rw with SBA to CGA. pt requires CGA at times due to instability posterior with change of direction and pt with narrow NICOLE at times causing instability. Continuing to follow and treat all acute and chronic conditions.      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    Past Surgical History: Patient has a past surgical history  that includes Back surgery; Eye surgery; Shoulder open rotator cuff repair; renal stones; Hernia repair; Colonoscopy (N/A, 1/28/2019); Cataract extraction w/  intraocular lens implant (Right); and Left heart catheterization (Left, 1/9/2022).    Social History: Patient reports that he quit smoking about 39 years ago. He has a 37.50 pack-year smoking history. He has never used smokeless tobacco. He reports that he does not drink alcohol and does not use drugs.    Family History:  No significant family history to report.    Allergies: Patient is allergic to iodine.    ROS  Constitutional: Negative for fever.  +appetite change   Eyes: Negative for blurred vision, double vision   Respiratory: Negative for shortness of breath, cough  Cardiovascular: Negative for chest pain, palpitations, and leg swelling.   Gastrointestinal: Negative for abdominal pain ,diarrhea, nausea, vomiting, constipation  Genitourinary: Negative for dysuria, frequency   Musculoskeletal:  + generalized weakness. Negative for back pain and myalgias.   Skin: Negative for itching and rash.   Neurological: Negative for dizziness, headaches.   Psychiatric/Behavioral: Negative for depression. The patient is not nervous/anxious.      24 hour Vital Sign Range   Temp:  [97.4 °F (36.3 °C)]   Pulse:  [83]   Resp:  [18]   BP: (157)/(80)   SpO2:  [95 %]     PEx  Constitutional: Patient appears debilitated.  No distress noted  HENT:   Head: Normocephalic and atraumatic.   Eyes: Pupils are equal, round  Neck: Normal range of motion. Neck supple.   Cardiovascular: Normal rate, regular rhythm and normal heart sounds.    Pulmonary/Chest: Effort normal and breath sounds are clear  Abdominal: Soft. Bowel sounds are normal.   Musculoskeletal: Normal range of motion.   Neurological: Alert and oriented to person, place, and time.   Psychiatric: Normal mood and affect. Behavior is normal.   Skin: Skin is warm and dry.  Wound that you did not assess today:  Wound  location(s)/type(s):  Sacrum  Not visualized today. No signs/symptoms of infection or wound-related changes reported.         Recent Labs   Lab 03/31/22  0512   *   K 4.2   CL 96   CO2 28   BUN 19   CREATININE 1.0   MG 1.5*       Recent Labs   Lab 03/31/22  0512   WBC 6.60   RBC 4.02*   HGB 11.8*   HCT 36.6*      MCV 91   MCH 29.4   MCHC 32.2         Assessment and Plan:       Type 2 diabetes mellitus with hyperglycemia, with long-term current use of insulin  - Per C, Patient with uncontrolled DM presenting with metabolic encephalopathy in the setting of DKA with coma. Suspect patient developed DKA in the setting of sepsis/ COVID-19- DKA protocol completed and DKA resolved in MICU and Pt stepped down on subq insulin.  Endocrine followed   - Diabetic diet, Accu-Cheks AC/HS  - home regimen with Levemir 20 units daily, NovoLog 9 units TID WM, metformin 1000 mg BID  - 3/31 initiated detemir 12 units daily, aspart 6 units TID WM, continue metformin 1000 mg BID    Constipation  -continue  enema PRN daily for constipation     Nasal congestion  - continue Zyrtec 10 mg qHS., Flonase daily    COVID-19 virus infection  Viral Pneumonia due to COVID-19  - COVID vaccinated with booster.   - Received 1 dose of sotrovimab infusion 02/18/2022  - Completed 5 days of remdesivir, had dexamethasone held initially due to DKA.  - Stable on room air  - End isolation on 3/9/2022  - continues to have mild productive cough   - PRN DuoNebs     Hypertension associated with diabetes  - home regimen with losartan 25mg, Toprol xl 50mg, diltiazem 120mg at home  - continue losartan 25mg daily     Paroxysmal atrial fibrillation  - home Metoprolol XL 50 mg daily, diltiazem  mg daily and amiodarone 200 mg daily, apixaban 5mg BID  - continue amiodarone 20 mg daily, apixaban 5 mg BID. Holding metoprolol due to hypotension/bradycardia, holding diltiazem due to hypotension     Coronary artery disease involving native coronary artery of  native heart with unstable angina pectoris  HLD  - Hx of CAD s/p placement of 2 LAUREN in RCA in 01/09/2022.   - Continue home ASA, Plavix and statin  - Chest pain 3/4 after breakfast; EKG nonischemic. PPI, Tums, carafate added     Pulmonary nodules  - Has stable bilateral pulmonary nodules/masses with broad differential per outpatient pulmonology notes. No pathology report as none of the nodules appeared to be safe for biopsy given high risk of PTX is high with many adjacent bulla. Plan working on promoting functional independence for the time being with possible addressing of nodules in the future, which is highly dependent upon his overall functionality.     Chronic pulmonary heart disease  - Follows up with Pulmonary clinic in [place]. Last seen in December and was evaluated for pulmonary nodules. Deemed to have mild COPD per notes. Not on any maintenance therapy.  - Continue albuterol inhaler   - likely needs follow-up with Pulmonary after discharge from [facility]     Embolic stroke involving right middle cerebral artery  - Hx of CVA in Jan 2022.   - CT Head with evolving infarcts in right frontal and left cerebellar hemispheres.   - Has had issues with memory per family since CVA   - No concern for acute stroke this admit per discussion with Radiology.   - Continue home antiplatelets, statin and Eliquis     Focal seizures  - Continue home lancosamide      Pressure injury of buttock, unstageable  - Seen by wound care with [facility] started  - Continues to cause patient discomfort  - initiate wound care consult at [facility]     Vitamin-D deficiency  - continue ergocalciferol 1000 units weekly     Hypomagnesemia  - magnesium oxide 400 mg BID   -3/28- Initiate order to increase mag oxide 400 mg TID for mg of 1.4     Peripheral neuropathy  - continue gabapentin 200 mg TID     Malnutrition of moderate degree  - RD following, appreciate recommendations, continue supplements as ordered     Debility   - Continue with PT/OT for gait  training and strengthening and restoration of ADL's   - Encourage mobility, OOB in chair, and early ambulation as appropriate  - Fall precautions   - Monitor for bowel and bladder dysfunction  - Monitor for and prevent skin breakdown and pressure ulcers  - Continue DVT prophylaxis with apixaban        Anticipate disposition:  Home with home health        Follow-up needed during SNF stay-       Follow-up needed after discharge from SNF:      - PCP - 4/6  - pulmonary- needs to be scheduled- post COVID/COPD   - Endocrinology- needs to be scheduled- diabetes mellitus type 2       Future Appointments   Date Time Provider Department Center   4/6/2022  1:00 PM Basim Guerrero MD [facility]       Jazmín Zambrano NP  Department of Hospital Medicine   [facility]     DOS: 3/31/2022       Patient note was created using MModal Dictation.  Any errors in syntax or even information may not have been identified and edited on initial review prior to signing this note.

## 2022-03-31 NOTE — PLAN OF CARE
Problem: Adult Inpatient Plan of Care  Goal: Plan of Care Review  Outcome: Ongoing, Progressing  Goal: Patient-Specific Goal (Individualized)  Outcome: Ongoing, Progressing     Problem: Adjustment to Illness (Sepsis/Septic Shock)  Goal: Optimal Coping  Outcome: Ongoing, Progressing     Problem: Skin Injury Risk Increased  Goal: Skin Health and Integrity  Outcome: Ongoing, Progressing     Problem: Fall Injury Risk  Goal: Absence of Fall and Fall-Related Injury  Outcome: Ongoing, Progressing     Problem: Impaired Wound Healing  Goal: Optimal Wound Healing  Outcome: Ongoing, Progressing     Problem: Nutrition Impaired (Sepsis/Septic Shock)  Goal: Optimal Nutrition Intake  Outcome: Ongoing, Progressing

## 2022-03-31 NOTE — PT/OT/SLP PROGRESS
Occupational Therapy   Treatment/Discharge Summary    Name: Kamar Muñoz  MRN: [id number]  Admit Date: 3/10/2022  Admitting Diagnosis:  Encephalopathy, metabolic    General Precautions: Standard, fall   Orthopedic Precautions:N/A   Braces:       Recommendations:     Discharge Recommendations: home health OT  Level of Assistance Recommended at Discharge: 24 hours supervision for safety in performing ADL's and home management tasks  Discharge Equipment Recommendations:  bedside commode, walker, rolling, wheelchair (W/C with 18x16 seat, swing away desk length arm rests and swing away fot rests with heel loops.)  Barriers to discharge:  Decreased caregiver support    Assessment:     Kamar Muñoz is a 76 y.o. male with a medical diagnosis of Encephalopathy, metabolic.  He presents with  Performance deficits affecting function are weakness, impaired endurance, impaired self care skills, impaired functional mobilty, gait instability, impaired balance, decreased coordination, decreased upper extremity function, pain, decreased lower extremity function, impaired cardiopulmonary response to activity.   Pt. Was cooperative and participated well with session on this day. Pt  continues to demonstrate levels of physical deficits with  functional indep with daily management activities tasks, selfcare skills with balance,  functional mobility, UB strength and endurance. Pt. Will continue to benefit from continued OT to progress towards goals with next level of care    Rehab Potential is fair    Activity tolerance:  Fair    Plan:     Patient to be seen 6 x/week to address the above listed problems via self-care/home management, therapeutic activities, therapeutic exercises    · Plan of Care Expires: 04/11/22  · Plan of Care Reviewed with: patient    Subjective     Communicated with: nsg and Pt prior to session. You need me I ready    Pain/Comfort:  Pain Rating 1: 4/10  Location - Orientation 1: generalized  Location 1:  sacral spine  Pain Addressed 1: Pre-medicate for activity, Reposition    Patient's cultural, spiritual, Bahai conflicts given the current situation:  no    Objective:     Patient found supine with upon OT entry to room.     Bed Mobility:    · Patient completed Supine to Sit with supervision  · Patient completed Sit to Supine with supervision     Functional Mobility/Transfers:  · Patient completed Sit <> Stand Transfer with stand by assistance and contact guard assistance  with  rolling walker   · Patient completed Bed <> Chair Transfer using Stand Pivot technique with stand by assistance and contact guard assistance with rolling walker  · Patient completed Toilet Transfer Stand Pivot technique with stand by assistance and contact guard assistance with  rolling walker    Activities of Daily Living:  · Grooming: modified independence seated at sink level  · Bathing: Stand by assistance at sink level  · Upper Dressing: modified independence  to doff/pema fresh shirt  · Lower body dressing contact guard assist to pema pants seated and to manage over hips instance with RW for bal  · Toileting: contact guard assistance with cleaning and clothing management from 3n1 level over toilet     [facility] 6 Click ADL: 19    Treatment & Education:   Pt edu on role of OT, POC, safety when performing self care tasks , benefit of performing OOB activity, and safety when performing functional transfers and mobility management for preparation with goals to progress towards next level of care    Patient left supine with all lines intact and call button in reachEducation:      GOALS:   Multidisciplinary Problems     Occupational Therapy Goals        Problem: Occupational Therapy Goal    Goal Priority Disciplines Outcome Interventions   Occupational Therapy Goal     OT, PT/OT Ongoing, Progressing    Description: Goals to be met by: 4/1/22    Patient will increase functional independence with ADLs by performing:    UE Dressing with Modified  [place].-Met  LE Dressing with Set-up Assistance-Not MET.  Grooming while seated at sink with Modified [place]. MET  Toileting from toilet or BSC with Supervision for hygiene and clothing management. Not MET  Bathing from sitting on shower seat with Supervision.-Not MET  Supine to sit with Modified [place].-MET  Stand pivot transfers with Modified [place] using A/D as needed.. -Not MET  Toilet transfer to toilet or BSC with Modified [place] using A/D as needed-Not MET  Upper extremity exercise using UBE with mod resistance for 10 minutes to increased functional   endurance to perform functional tasks and mobility.   Met  Caregiver will be educated on level of assist required to safely perform self care tasks and functional transfers.. -Not MET                    Time Tracking:     OT Date of Treatment: OT Date of Treatment: 03/31/22  OT Total Time (min):      Billable Minutes:Self Care/Home Management 40  3/31/2022  A client care conference was performed between the HEIDY and GAURAV, prior to treatment to discuss the patient's status, treatment plan and established goals.

## 2022-03-31 NOTE — PT/OT/SLP PROGRESS
"Physical Therapy Treatment/D/C Summary    Patient Name:  Kamar Muñoz   MRN:  [id number]  Admit Date: 3/10/2022  Admitting Diagnosis: Encephalopathy, metabolic  General Precautions: Standard, fall   Orthopedic Precautions:N/A   Braces: N/A     Recommendations:     Discharge Recommendations:  home health PT   Level of Assistance Recommended at Discharge: 24 hours light assistance  Discharge Equipment Recommendations: bedside commode, walker, rolling, wheelchair   Barriers to discharge: Decreased caregiver support    Assessment:     Kamar Muñoz is a 76 y.o. male admitted with a medical diagnosis of Encephalopathy, metabolic . Pt is unsafe with functional mobility at this time due to pt requires no assist for bed mobility, SBA/CGA for transfers, CGA for up/down curb step, and CGA for gait due to instability posterior at times with change of direction. Pt is motivated to progress with functional mobility.    Performance deficits affecting function:  weakness, gait instability, impaired balance, impaired functional mobilty, decreased safety awareness, pain, impaired skin .    Activity Tolerance: Good    Plan:     Patient to be seen 6 x/week to address the above listed problems via gait training, therapeutic activities, therapeutic exercises, neuromuscular re-education, wheelchair management/training    · Plan of Care Expires: 04/10/22  · Plan of Care Reviewed with: patient    Subjective   "This thing on my butt hurts when I sit and when I stand".     Pain/Comfort:  · Pain Rating 1: 5/10 (pt c/o pain at the site of his sacral wound)  · Location - Orientation 1: generalized  · Location 1: sacral spine  · Pain Addressed 1: Reposition, Cessation of Activity  · Pain Rating Post-Intervention 1: 5/10 ("It hurts in sitting and standing")    Patient's cultural, spiritual, Buddhist conflicts given the current situation:  · no    Objective:     Communicated with nurse prior to session.  Patient found right sidelying with  " "(no lines) upon PT entry to room.     Therapeutic Activities and Exercises: Pt performed B LE exs on cross  x 10 min to strengthen B LE. Pt performed standing balance activities x 2 min x 3 trials: pitch and catch and kicking the ball with L and R foot. Pt with lateral instability requiring minimal assist to correct.    Functional Mobility:  · Bed Mobility:     · Rolling Left:  modified independence  · Rolling Right: modified independence  · Supine to Sit: modified independence  · Sit to Supine: modified independence  · Transfers:     · Sit to Stand:  stand by assistance with rolling walker  · Bed to Chair: contact guard assistance with  no AD  using  Stand Pivot  · Gait: 180ft then 150ft with Rw with SBA to CGA. pt requires CGA at times due to instability posterior with change of direction and pt with narrow NICOLE at times causing instability.   · Stairs:  Pt ascended/descended 4" curb step with RW with no handrails with Contact Guard Assistance.     AM-PAC 6 CLICK MOBILITY  20    Patient left up in chair with call button in reach and nurse notified.    GOALS:   Multidisciplinary Problems     Physical Therapy Goals        Problem: Physical Therapy Goal    Goal Priority Disciplines Outcome Goal Variances Interventions   Physical Therapy Goal     PT, PT/OT Adequate for Care Transition     Description: Goals to be met by: 3/25/22    Patient will increase functional independence with mobility by performing:    . Supine to sit with [place]-met 3/24/22  . Sit to supine with [place]-met 3/24/22  . Rolling to Left and Right with [place].  . Sit to stand transfer with Supervision-met 3/24/22  . Bed to chair transfer with Supervision using No Assistive Device  . Gait  x 150 feet with Supervision using Rolling Walker.   . Wheelchair propulsion x150 feet with Supervision using bilateral uppper extremities  . Ascend/descend 4 stair with bilateral Handrails Contact Guard Assistance using No Assistive Device. " -met 3/31/22  . Ascend/Descend 4 inch curb step with Contact Guard Assistance using Rolling Walker. - Met 03/19  . Retrieve object off floor in standing with RW and reacher and SBA.- Met 03/19                     Time Tracking:     PT Received On: 03/31/22  PT Total Time (min):   48    Billable Minutes: Gait Training 30 and Therapeutic Exercise 18    Treatment Type: Treatment  PT/PTA: PT     PTA Visit Number: 0     03/31/2022

## 2022-03-31 NOTE — PLAN OF CARE
Problem: Physical Therapy Goal  Goal: Physical Therapy Goal  Description: Goals to be met by: 3/25/22    Patient will increase functional independence with mobility by performing:    . Supine to sit with [place]-met 3/24/22  . Sit to supine with [place]-met 3/24/22  . Rolling to Left and Right with [place].  . Sit to stand transfer with Supervision-met 3/24/22  . Bed to chair transfer with Supervision using No Assistive Device  . Gait  x 150 feet with Supervision using Rolling Walker.   . Wheelchair propulsion x150 feet with Supervision using bilateral uppper extremities  . Ascend/descend 4 stair with bilateral Handrails Contact Guard Assistance using No Assistive Device. -met 3/31/22  . Ascend/Descend 4 inch curb step with Contact Guard Assistance using Rolling Walker. - Met 03/19  . Retrieve object off floor in standing with RW and reacher and SBA.- Met 03/19    Outcome: Adequate for Care Transition   Pt being D/Vitaliy to home with HH and sitter assist. Pt will require assist with functional mobility for safety.  3/31/2022

## 2022-04-01 VITALS
BODY MASS INDEX: 21.23 KG/M2 | SYSTOLIC BLOOD PRESSURE: 144 MMHG | HEIGHT: 74 IN | WEIGHT: 165.38 LBS | OXYGEN SATURATION: 96 % | RESPIRATION RATE: 16 BRPM | DIASTOLIC BLOOD PRESSURE: 70 MMHG | HEART RATE: 87 BPM | TEMPERATURE: 98 F

## 2022-04-01 LAB — POCT GLUCOSE: 395 MG/DL (ref 70–110)

## 2022-04-01 PROCEDURE — 25000003 PHARM REV CODE 250: Performed by: NURSE PRACTITIONER

## 2022-04-01 PROCEDURE — 25000003 PHARM REV CODE 250: Performed by: HOSPITALIST

## 2022-04-01 RX ADMIN — APIXABAN 5 MG: 2.5 TABLET, FILM COATED ORAL at 08:04

## 2022-04-01 RX ADMIN — ASPIRIN 81 MG: 81 TABLET, COATED ORAL at 08:04

## 2022-04-01 RX ADMIN — PANTOPRAZOLE SODIUM 40 MG: 40 TABLET, DELAYED RELEASE ORAL at 08:04

## 2022-04-01 RX ADMIN — METFORMIN HYDROCHLORIDE 1000 MG: 500 TABLET ORAL at 08:04

## 2022-04-01 RX ADMIN — ATORVASTATIN CALCIUM 40 MG: 20 TABLET, FILM COATED ORAL at 08:04

## 2022-04-01 RX ADMIN — LOSARTAN POTASSIUM 25 MG: 25 TABLET, FILM COATED ORAL at 08:04

## 2022-04-01 RX ADMIN — BACITRACIN ZINC, NEOMYCIN SULFATE, AND POLYMYXIN B SULFATE: 400; 3.5; 5 OINTMENT TOPICAL at 08:04

## 2022-04-01 RX ADMIN — CLOPIDOGREL 75 MG: 75 TABLET, FILM COATED ORAL at 08:04

## 2022-04-01 RX ADMIN — Medication 400 MG: at 08:04

## 2022-04-01 RX ADMIN — LIDOCAINE 1 PATCH: 50 PATCH CUTANEOUS at 12:04

## 2022-04-01 RX ADMIN — THERA TABS 1 TABLET: TAB at 08:04

## 2022-04-01 RX ADMIN — GABAPENTIN 200 MG: 100 CAPSULE ORAL at 08:04

## 2022-04-01 RX ADMIN — INSULIN ASPART 6 UNITS: 100 INJECTION, SOLUTION INTRAVENOUS; SUBCUTANEOUS at 08:04

## 2022-04-01 RX ADMIN — INSULIN ASPART 5 UNITS: 100 INJECTION, SOLUTION INTRAVENOUS; SUBCUTANEOUS at 08:04

## 2022-04-01 RX ADMIN — LACOSAMIDE 100 MG: 50 TABLET, FILM COATED ORAL at 08:04

## 2022-04-01 RX ADMIN — Medication 500 MG: at 08:04

## 2022-04-01 RX ADMIN — ERGOCALCIFEROL 50000 UNITS: 1.25 CAPSULE ORAL at 08:04

## 2022-04-01 RX ADMIN — AMIODARONE HYDROCHLORIDE 200 MG: 200 TABLET ORAL at 08:04

## 2022-04-01 RX ADMIN — SUCRALFATE 1 G: 1 TABLET ORAL at 08:04

## 2022-04-01 RX ADMIN — DICLOFENAC SODIUM 4 G: 10 GEL TOPICAL at 08:04

## 2022-04-01 NOTE — NURSING
Discharge instructions reviewed with daughter and patient listening @ bedside. Reviewed follow-up visits and changes on any medication as well as non changes. Daughter denied questions. Stated understood all that was reviewed. Assisted patient to w/c to transport from floor to car to home with daughter accompanying and with HH orders on record.

## 2022-04-01 NOTE — PLAN OF CARE
[facility] - Skilled Nursing  Discharge Final Note    Patient d/c as planned to his home. He was transported by his daughter Basia Herrera.  No HME was needed. Home Health has been authorized with [facility].  Patient has follow-up appointment with pcp on 4/6.    Francis Sethi, [facility] [place]  Case Management Department  [facility]  [email or web address]        Primary Care Provider: Basim Guerrero MD    Expected Discharge Date: 4/1/2022    Final Discharge Note (most recent)     Final Note - 04/01/22 1704        Final Note    Assessment Type Final Discharge Note     Anticipated Discharge Disposition Home-Health Care [facility]     Hospital Resources/Appts/Education Provided Provided patient/caregiver with written discharge plan information;Appointments scheduled by Navigator/Coordinator;Post-Acute resouces added to AVS        Post-Acute Status    Post-Acute Authorization Home Health     Home Health Status Set-up Complete/Auth obtained     Discharge Delays None known at this time                 Important Message from Medicare  Important Message from Medicare regarding Discharge Appeal Rights: Given to patient/caregiver, Explained to patient/caregiver, Signed/date by patient/caregiver     Date IMM was signed: 03/29/22  Time IMM was signed: 1050    Contact Info     Basim Guerrero MD   Specialty: Family Medicine   Relationship: PCP - General    [address] [place] [address]   Phone: [phone]       Next Steps: Schedule an appointment as soon as possible for a visit    Instructions: WITHIN 1 WEEK    [facility] ENDOCRINOLOGY   Specialty: Endocrinology    [address]  [place] [address]   Phone: [phone]       Next Steps: Schedule an appointment as soon as possible for a visit    Instructions: WITHIN 1 WEEK

## 2022-04-01 NOTE — HOSPITAL COURSE
Patient progressed well with PT and OT- last PT note states that patient ambulated***. Patient had no significant events during their stay at SNF. Home health was set up. DME was ordered if needed. Follow up appointment to be made by patient within one week. All prescriptions and discharge instructions were ordered to be given to the patient prior to discharge.     PEx  Constitutional: Patient appears debilitated.  No distress noted  HENT:   Head: Normocephalic and atraumatic.   Eyes: Pupils are equal, round  Neck: Normal range of motion. Neck supple.   Cardiovascular: Normal rate, regular rhythm and normal heart sounds.    Pulmonary/Chest: Effort normal and breath sounds are clear  Abdominal: Soft. Bowel sounds are normal.   Musculoskeletal: Normal range of motion.   Neurological: Alert and oriented to person, place, and time.   Psychiatric: Normal mood and affect. Behavior is normal.   Skin: Skin is warm and dry.      WOUND  Date identified - POA- 3/10/2022  Location:  Sacrum and left buttocks  Type:  Partial thickness tissue loss  Stage (if pressure): II  Appearance:  Red, moist  0.7cm x 0.7cm x 0.2cm   Undermining/tunneling: No   Drainage:  None  Odor: none   Edges: Irregular edges  Periwound:  Ecchymotic/excoriated  Progress:   decreased in size

## 2022-04-01 NOTE — DISCHARGE SUMMARY
"[facility] Medicine  Discharge Summary      Patient Name: Kamar Muñoz  MRN: [id number]  Patient Class: IP- SNF  Admission Date: 3/10/2022  Hospital Length of Stay: 22 days  Discharge Date and Time:  04/01/2022 1:30 PM  Attending Physician: No att. providers found   Discharging Provider: Jazmín Zambrano NP  Primary Care Provider: Basim Guerrero MD      HPI:     Kamar Muñoz is a 76 year old male with PMHx of CAD s/p 2 LAUREN in RCA (Jan 2022), HTN, HLD, p. A. 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  Admission to SNF for secondary weakness debility, continued insulin adjustments.       Patient originally presented to [facility] [place] on 02/19 with vomiting and altered mental status, found to be COVID positive. " Patient's daughter at bedside reports the patient was recently in the ED on 2/17 for a mechanical fall after being discharged from rehab the same day. CTH and cervical spine revealed evolving late subacute infarct involving the right frontal lobe with questionable petechial hemorrhage. Vascular neurology evaluated the patient and cleared him for discharge from without any new interventions. He was also tested positive for COVID-19 and was discharged yesterday from the ED. He subsequently received one dose of sotrovimab infusion for COVID and was in a normal state of health until this morning when his daughter found him lethargic and barely responsive prompting her to bring him to the ED. She reports the patient had no complaints prior to developing his AMS. Of note, the patient was recently admitted to [facility] from 01/25 through 02/13 for AMS concerning for CVA, however he was treated for metabolic encephalopathy 2/2 to DKA/HHS, sepsis and BRENDAN. In the ED, he was placed on 6L NC,  CXR with intersitial opacities. He received " "~4L of IVF, vancomycin, zosyn, initiated on insulin shifted for hyperkalemia and calcium for cardioprotection. ECG without noticeable ischemic changes. CTH without new changes.  Patient was admitted to the MICU for further management.  Placed on remdesivir and broad spectrum IV abx in addition to insulin per DKA protocol. In MICU: Weaned supp O2 to room air; Transitioned to subq insulin; Improvement of AMS. BRENDAN improving gradually. Pt had discussion w/ family in MICU and transitioned from full code to DNR/DNI. Pt w/ episode of CP and hypoglycemia upon stepdown. Ischemic work-up largely unremarkable with trop down trending from admission. Detemir dosing adjusted from BID to qhs. He had additional hypoglycemic episode upon re-uptitration of long-acting insulin from 12 to 15. Dosing decreased to 13u as patient was hyperglycemic to 230s w/12u qhs. Worsening hyperglycemia to 270s- insulin dose modified to 3u TID wm, and detemir 14u qhs. Episode of abdominal pain and diarrhea 02/25- appeared to be 2/2 antibiotic use. Antibiotics stopped 02/25. Pt w/persistent diarrhea- c diff studies sent- loperamide stopped. Pt was found to have St 3 sacral pressure ulcer. Patient reportedly hypotensive on 3/5 and endorsing nausea, septic work up ordered but ultimately unrevealing. Patient's BP responded after 2.5L of fluid. Presume hypovolemia was the source of his hypotension."  Blood glucose levels continue to be difficult to treat.  Patient was followed by Endocrinology.     Today, patient noted to have 24 hour blood glucose range of 123-425.  He endorses a mild productive cough and decreased appetite.     Patient will be treated at [facility] with PT and OT to improve functional status and ability to perform ADLs.    Hospital Course:   Patient progressed well with PT and OT- last PT note states that patient [id number] then 150ft with Rw with SBA to CGA. pt requires CGA at times due to instability posterior with change of " direction and pt with narrow NICOLE at times causing instability.  Patient suffered a fall while at at SNF.  He was sent to ED for evaluation.  Patient's blood glucose levels were difficult to control OM and remain difficult to control at SNF.  Diet being served at facility could have been contributing factor.  Patient with hypoglycemic events as well.  Patient was discharged close to his home regimen with instructions to increase to his home regimen of insulin should his blood glucose levels remain elevated.  Daughter also provided with instructions.  Home health was set up. DME was ordered if needed. Follow up appointment to be made by patient within one week. All prescriptions and discharge instructions were ordered to be given to the patient prior to discharge.     PEx  Constitutional: Patient appears debilitated.  No distress noted  HENT:   Head: Normocephalic and atraumatic.   Eyes: Pupils are equal, round  Neck: Normal range of motion. Neck supple.   Cardiovascular: Normal rate, regular rhythm and normal heart sounds.    Pulmonary/Chest: Effort normal and breath sounds are clear  Abdominal: Soft. Bowel sounds are normal.   Musculoskeletal: Normal range of motion.   Neurological: Alert and oriented to person, place, and time.   Psychiatric: Normal mood and affect. Behavior is normal.   Skin: Skin is warm and dry.      WOUND  Date identified - POA- 3/10/2022  Location:  Sacrum and left buttocks  Type:  Partial thickness tissue loss  Stage (if pressure): II  Appearance:  Red, moist  0.5cm x 0.5cm x 0.2cm   Undermining/tunneling: No   Drainage:  None  Odor: none   Edges: Irregular edges  Periwound:  Ecchymotic/excoriated  Progress:   decreased in size       Goals of Care Treatment Preferences:  Code Status: DNR      Consults:   Consults (From admission, onward)        Status Ordering Provider     Inpatient consult to Registered Dietitian/Nutritionist  Once        Provider:  (Not yet assigned)    Completed DEMETRIUS,  HEIDY ANDRADE     Inpatient consult to Registered Dietitian/Nutritionist  Once        Provider:  (Not yet assigned)    Completed HEIDY ROMO          No new Assessment & Plan notes have been filed under this hospital service since the last note was generated.  Service: Hospital Medicine    Final Active Diagnoses:    Diagnosis Date Noted POA    PRINCIPAL PROBLEM:  Encephalopathy, metabolic [G93.41] 01/25/2022 Yes    Malnutrition of moderate degree [E44.0] 03/11/2022 Yes    Coronary artery disease involving native heart without angina pectoris [I25.10] 01/19/2022 Yes    Paroxysmal atrial fibrillation [I48.0] 01/12/2022 Yes     Chronic    Embolic stroke involving right middle cerebral artery [I63.411] 01/12/2022 Yes     Chronic    Type 2 diabetes mellitus with diabetic peripheral angiopathy without gangrene, with long-term current use of insulin [E11.51, Z79.4] 01/05/2022 Not Applicable    Chronic pulmonary heart disease [I27.9]  Yes    Centrilobular emphysema [J43.2] 12/12/2018 Yes    Type 2 diabetes mellitus with hyperglycemia, with long-term current use of insulin [E11.65, Z79.4] 03/08/2013 Not Applicable     Chronic    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 Schedule an appointment as soon as possible for a visit.    Specialty: Family Medicine  Why: WITHIN 1 WEEK  Contact information:  [address] [place] [address] [phone]             [facility] ENDOCRINOLOGY. Schedule an appointment as soon as possible for a visit.    Specialty: Endocrinology  Why: WITHIN 1 WEEK  Contact information:  [address]  [facility] [address]  [phone]                     Patient Instructions:      WHEELCHAIR  "FOR HOME USE     Order Specific Question Answer Comments   Hours in W/C per day: 8    Type of Wheelchair: Lightweight    Patient unable to propel in Standard wheelchair? Yes    Size(Width): 18"(STD adult)    Leg Support: Swing Away    Arm Height: Desk length    Arm Height: Swing away    Lap Belt: Velcro    Accessories: Heel loops    Cushion: Basic    Justification for cushion: Prevent pressure ulcers    Height: 6' 2" (1.88 m)    Weight: 75.2 kg (165 lb 12.6 oz)    Does patient have medical equipment at home? walker, rolling    Length of need (1-99 months): 99    Please check all that apply: Caregiver is capable and willing to operate wheelchair safely.    Please check all that apply: The patient requires the use of a w/c for activities of daily living within the Home.    Please check all that apply: Patient mobility limitations cannot be sufficiently resolved by the use of other ambulatory therapies.      WALKER FOR HOME USE     Order Specific Question Answer Comments   Type of Walker: Adult (5'4"-6'6")    With wheels? Yes    Height: 6' 2" (1.88 m)    Weight: 75.2 kg (165 lb 12.6 oz)    Length of need (1-99 months): 99    Does patient have medical equipment at home? walker, rolling    Please check all that apply: Patient's condition impairs ambulation.    Please check all that apply: Walker will be used for gait training.    Please check all that apply: Patient is unable to safely ambulate without equipment.      3 IN 1 COMMODE FOR HOME USE     Order Specific Question Answer Comments   Type: Standard    Height: 6' 2" (1.88 m)    Weight: 75.2 kg (165 lb 12.6 oz)    Does patient have medical equipment at home? walker, rolling    Length of need (1-99 months): 99      HOSPITAL BED FOR HOME USE     Order Specific Question Answer Comments   Type: Semi-electric    Length of need (1-99 months): 99    Does patient have medical equipment at home? walker, rolling    Height: 6' 2" (1.88 m)    Weight: 75 kg (165 lb 5.5 oz)  "   Please check all that apply: Patient requires a bed height different than a fixed height hospital bed to permit transfers to chair, wheelchair, or standing.      Ambulatory referral/consult to Endocrinology   Standing Status: Future   Referral Priority: Routine Referral Type: Consultation   Requested Specialty: Endocrinology   Number of Visits Requested: 1     No driving until:   Order Comments: Cleared by PCP     Notify your health care provider if you experience any of the following:  temperature >100.4     Notify your health care provider if you experience any of the following:  persistent nausea and vomiting or diarrhea     Notify your health care provider if you experience any of the following:  severe uncontrolled pain     Notify your health care provider if you experience any of the following:  redness, tenderness, or signs of infection (pain, swelling, redness, odor or green/yellow discharge around incision site)     Notify your health care provider if you experience any of the following:  difficulty breathing or increased cough     Notify your health care provider if you experience any of the following:  severe persistent headache     Notify your health care provider if you experience any of the following:  worsening rash     Notify your health care provider if you experience any of the following:  persistent dizziness, light-headedness, or visual disturbances     Notify your health care provider if you experience any of the following:  increased confusion or weakness     Activity as tolerated     Ambulatory Request for Ready Responder Services   Standing Status: Future Standing Exp. Date: 03/23/23     Order Specific Question Answer Comments   Program referred to: COVID-19 Vaccine        Significant Diagnostic Studies: Labs:   BMP:   Recent Labs   Lab 03/31/22 0512   *   *   K 4.2   CL 96   CO2 28   BUN 19   CREATININE 1.0   CALCIUM 9.0   MG 1.5*    and CBC   Recent Labs   Lab 03/31/22 0512    WBC 6.60   HGB 11.8*   HCT 36.6*          Pending Diagnostic Studies:     None         Medications:  Reconciled Home Medications:      Medication List      START taking these medications    cetirizine 10 MG tablet  Commonly known as: ZYRTEC  Take 1 tablet (10 mg total) by mouth every evening.     neomycin-bacitracin-polymyxin ointment  Commonly known as: NEOSPORIN  Apply topically 2 (two) times daily.        CHANGE how you take these medications    insulin aspart U-100 100 unit/mL (3 mL) Inpn pen  Commonly known as: NovoLOG  Inject 6 Units into the skin 3 (three) times daily.  What changed:   · how much to take  · when to take this  · reasons to take this  · Another medication with the same name was removed. Continue taking this medication, and follow the directions you see here.     insulin glargine 100 unit/mL injection  Commonly known as: Lantus  Inject 12 Units into the skin once daily.  What changed:   · how much to take  · when to take this     metFORMIN 1000 MG tablet  Commonly known as: GLUCOPHAGE  Take 1 tablet (1,000 mg total) by mouth 2 (two) times daily with meals.  What changed:   · medication strength  · how much to take        CONTINUE taking these medications    amiodarone 200 MG Tab  Commonly known as: PACERONE  Take 1 tablet (200 mg total) by mouth once daily.     aspirin 81 MG EC tablet  Commonly known as: ECOTRIN  Take 81 mg by mouth once daily.     atorvastatin 40 MG tablet  Commonly known as: LIPITOR  Take 1 tablet (40 mg total) by mouth once daily.     blood sugar diagnostic Strp  1 strip by Misc.(Non-Drug; Combo Route) route 2 (two) times a day.     clopidogreL 75 mg tablet  Commonly known as: PLAVIX  Take 1 tablet (75 mg total) by mouth once daily.     diclofenac sodium 1 % Gel  Commonly known as: VOLTAREN  Apply 4 g topically 3 (three) times daily. Apply to sacrun closed skin/buttocks     ELIQUIS 5 mg Tab  Generic drug: apixaban  Take 1 tablet (5 mg total) by mouth 2 (two) times  "daily.     ergocalciferol 50,000 unit Cap  Commonly known as: ERGOCALCIFEROL  Take 1 capsule (50,000 Units total) by mouth every 7 days.     gabapentin 100 MG capsule  Commonly known as: NEURONTIN  Take 2 capsules (200 mg total) by mouth 3 (three) times daily.     lacosamide 100 mg Tab  Commonly known as: VIMPAT  Take 1 tablet (100 mg total) by mouth every 12 (twelve) hours.     lancets Misc  Commonly known as: ONETOUCH ULTRASOFT LANCETS  1 lancet by Misc.(Non-Drug; Combo Route) route 2 (two) times a day.     losartan 25 MG tablet  Commonly known as: COZAAR  Take 1 tablet (25 mg total) by mouth once daily.     magnesium oxide 400 mg (241.3 mg magnesium) tablet  Commonly known as: MAG-OX  Take 1 tablet (400 mg total) by mouth 2 (two) times daily.     metoprolol succinate 50 MG 24 hr tablet  Commonly known as: TOPROL-XL  Take 1 tablet (50 mg total) by mouth once daily.     MULTIVITAMIN ORAL  Take 1 tablet by mouth once daily.     nitroGLYCERIN 0.4 MG SL tablet  Commonly known as: NITROSTAT  Place 1 tablet (0.4 mg total) under the tongue every 5 (five) minutes as needed for Chest pain.     oxyCODONE 5 MG immediate release tablet  Commonly known as: ROXICODONE  Take 1 tablet (5 mg total) by mouth every 6 (six) hours as needed for Pain.     pantoprazole 40 MG tablet  Commonly known as: PROTONIX  Take 1 tablet (40 mg total) by mouth once daily.     * pen needle, diabetic 30 gauge x 5/16" Ndle  Commonly known as: PEN NEEDLE  1 Units by Misc.(Non-Drug; Combo Route) route 3 (three) times daily.     * BD ULTRA-FINE SHORT PEN NEEDLE 31 gauge x 5/16" Ndle  Generic drug: pen needle, diabetic  3 (three) times daily.     polycarbophil 625 mg tablet  Commonly known as: FIBERCON  Take 1 tablet by mouth once daily.     senna-docusate 8.6-50 mg 8.6-50 mg per tablet  Commonly known as: PERICOLACE  Take 1 tablet by mouth once daily.     sucralfate 1 gram tablet  Commonly known as: CARAFATE  Take 1 tablet (1 g total) by mouth 3 (three) " times daily before meals.         * This list has 2 medication(s) that are the same as other medications prescribed for you. Read the directions carefully, and ask your doctor or other care provider to review them with you.            STOP taking these medications    albuterol 90 mcg/actuation inhaler  Commonly known as: PROVENTIL/VENTOLIN HFA     diltiaZEM 120 MG Cp24  Commonly known as: CARDIZEM CD     LIDOcaine 5 %  Commonly known as: LIDODERM     loperamide 2 mg capsule  Commonly known as: IMODIUM     melatonin 3 mg tablet  Commonly known as: MELATIN     molnupiravir 200 mg capsule (EUA)     pulse oximeter device  Commonly known as: pulse oximeter            Indwelling Lines/Drains at time of discharge:   Lines/Drains/Airways     None                 Time spent on the discharge of patient: 37 minutes         Jazmín Zambrano NP  Department of [facility] Medicine  [facility] - [facility]

## 2022-04-04 ENCOUNTER — SOCIAL WORK (OUTPATIENT)
Dept: CASE MANAGEMENT | Facility: HOSPITAL | Age: 77
End: 2022-04-04
Payer: MEDICARE

## 2022-04-04 NOTE — PROGRESS NOTES
Patient contacted JASIEL to report HH had not come to see him as of yet. Patient discharged Friday 4/1. JASIEL contacted [facility] to inquire. [facility] had already spoken to Pt's wife and set up first appointment.    Salina Sethi, [facility]  Case Management Department  [facility]  [email or web address]

## 2022-04-05 ENCOUNTER — HOSPITAL ENCOUNTER (INPATIENT)
Facility: HOSPITAL | Age: 77
LOS: 5 days | Discharge: HOME HEALTH | DRG: 637 | End: 2022-04-10
Attending: EMERGENCY MEDICINE | Admitting: INTERNAL MEDICINE
Payer: MEDICARE

## 2022-04-05 ENCOUNTER — TELEPHONE (OUTPATIENT)
Dept: FAMILY MEDICINE | Facility: CLINIC | Age: 77
End: 2022-04-05
Payer: MEDICARE

## 2022-04-05 DIAGNOSIS — Z95.5 STENTED CORONARY ARTERY: ICD-10-CM

## 2022-04-05 DIAGNOSIS — I25.2 HISTORY OF ST ELEVATION MYOCARDIAL INFARCTION (STEMI): Chronic | ICD-10-CM

## 2022-04-05 DIAGNOSIS — E11.65 TYPE 2 DIABETES MELLITUS WITH HYPERGLYCEMIA, WITH LONG-TERM CURRENT USE OF INSULIN: Chronic | ICD-10-CM

## 2022-04-05 DIAGNOSIS — R56.9 FOCAL SEIZURES: Chronic | ICD-10-CM

## 2022-04-05 DIAGNOSIS — R73.9 HYPERGLYCEMIA: ICD-10-CM

## 2022-04-05 DIAGNOSIS — E13.10 DIABETIC KETOACIDOSIS WITHOUT COMA ASSOCIATED WITH OTHER SPECIFIED DIABETES MELLITUS: Primary | ICD-10-CM

## 2022-04-05 DIAGNOSIS — E11.10 DIABETIC KETOACIDOSIS WITHOUT COMA ASSOCIATED WITH TYPE 2 DIABETES MELLITUS: ICD-10-CM

## 2022-04-05 DIAGNOSIS — Z79.01 CHRONIC ANTICOAGULATION: ICD-10-CM

## 2022-04-05 DIAGNOSIS — I48.0 PAROXYSMAL ATRIAL FIBRILLATION: Chronic | ICD-10-CM

## 2022-04-05 DIAGNOSIS — E13.40 NEUROPATHY DUE TO SECONDARY DIABETES: Chronic | ICD-10-CM

## 2022-04-05 DIAGNOSIS — I15.2 HYPERTENSION ASSOCIATED WITH DIABETES: Chronic | ICD-10-CM

## 2022-04-05 DIAGNOSIS — J43.2 CENTRILOBULAR EMPHYSEMA: ICD-10-CM

## 2022-04-05 DIAGNOSIS — E11.59 HYPERTENSION ASSOCIATED WITH DIABETES: Chronic | ICD-10-CM

## 2022-04-05 DIAGNOSIS — I25.110 CORONARY ARTERY DISEASE INVOLVING NATIVE CORONARY ARTERY OF NATIVE HEART WITH UNSTABLE ANGINA PECTORIS: Chronic | ICD-10-CM

## 2022-04-05 DIAGNOSIS — Z79.4 TYPE 2 DIABETES MELLITUS WITH HYPERGLYCEMIA, WITH LONG-TERM CURRENT USE OF INSULIN: Chronic | ICD-10-CM

## 2022-04-05 DIAGNOSIS — R07.9 CHEST PAIN: ICD-10-CM

## 2022-04-05 DIAGNOSIS — Z86.73 HISTORY OF EMBOLIC STROKE: ICD-10-CM

## 2022-04-05 PROBLEM — R53.81 DEBILITY: Status: ACTIVE | Noted: 2022-02-16

## 2022-04-05 PROBLEM — I63.442 EMBOLIC STROKE INVOLVING LEFT CEREBELLAR ARTERY: Status: RESOLVED | Noted: 2022-01-13 | Resolved: 2022-04-05

## 2022-04-05 PROBLEM — F43.24 ADJUSTMENT DISORDER WITH DISTURBANCE OF CONDUCT: Status: ACTIVE | Noted: 2022-02-16

## 2022-04-05 LAB
ALBUMIN SERPL BCP-MCNC: 3.1 G/DL (ref 3.5–5.2)
ALLENS TEST: ABNORMAL
ALLENS TEST: NORMAL
ALP SERPL-CCNC: 131 U/L (ref 55–135)
ALT SERPL W/O P-5'-P-CCNC: 14 U/L (ref 10–44)
ANION GAP SERPL CALC-SCNC: 24 MMOL/L (ref 8–16)
ANION GAP SERPL CALC-SCNC: 7 MMOL/L (ref 8–16)
AST SERPL-CCNC: 19 U/L (ref 10–40)
B-OH-BUTYR BLD STRIP-SCNC: 5.9 MMOL/L (ref 0–0.5)
BACTERIA #/AREA URNS AUTO: ABNORMAL /HPF
BASOPHILS # BLD AUTO: 0.06 K/UL (ref 0–0.2)
BASOPHILS NFR BLD: 1 % (ref 0–1.9)
BILIRUB SERPL-MCNC: 0.6 MG/DL (ref 0.1–1)
BILIRUB UR QL STRIP: NEGATIVE
BUN SERPL-MCNC: 26 MG/DL (ref 8–23)
BUN SERPL-MCNC: 34 MG/DL (ref 8–23)
CALCIUM SERPL-MCNC: 8.6 MG/DL (ref 8.7–10.5)
CALCIUM SERPL-MCNC: 9.4 MG/DL (ref 8.7–10.5)
CHLORIDE SERPL-SCNC: 105 MMOL/L (ref 95–110)
CHLORIDE SERPL-SCNC: 96 MMOL/L (ref 95–110)
CLARITY UR REFRACT.AUTO: CLEAR
CO2 SERPL-SCNC: 14 MMOL/L (ref 23–29)
CO2 SERPL-SCNC: 25 MMOL/L (ref 23–29)
COLOR UR AUTO: ABNORMAL
CREAT SERPL-MCNC: 1.2 MG/DL (ref 0.5–1.4)
CREAT SERPL-MCNC: 1.4 MG/DL (ref 0.5–1.4)
CTP QC/QA: YES
DIFFERENTIAL METHOD: ABNORMAL
EOSINOPHIL # BLD AUTO: 0 K/UL (ref 0–0.5)
EOSINOPHIL NFR BLD: 0.7 % (ref 0–8)
ERYTHROCYTE [DISTWIDTH] IN BLOOD BY AUTOMATED COUNT: 16 % (ref 11.5–14.5)
EST. GFR  (AFRICAN AMERICAN): 55.2 ML/MIN/1.73 M^2
EST. GFR  (AFRICAN AMERICAN): >60 ML/MIN/1.73 M^2
EST. GFR  (NON AFRICAN AMERICAN): 47.8 ML/MIN/1.73 M^2
EST. GFR  (NON AFRICAN AMERICAN): 57.6 ML/MIN/1.73 M^2
ESTIMATED AVG GLUCOSE: 232 MG/DL (ref 68–131)
GLUCOSE SERPL-MCNC: 202 MG/DL (ref 70–110)
GLUCOSE SERPL-MCNC: 626 MG/DL (ref 70–110)
GLUCOSE UR QL STRIP: ABNORMAL
HBA1C MFR BLD: 9.7 % (ref 4–5.6)
HCO3 UR-SCNC: 19.6 MMOL/L (ref 24–28)
HCT VFR BLD AUTO: 33.3 % (ref 40–54)
HGB BLD-MCNC: 10.4 G/DL (ref 14–18)
HGB UR QL STRIP: ABNORMAL
IMM GRANULOCYTES # BLD AUTO: 0.02 K/UL (ref 0–0.04)
IMM GRANULOCYTES NFR BLD AUTO: 0.3 % (ref 0–0.5)
KETONES UR QL STRIP: ABNORMAL
LDH SERPL L TO P-CCNC: 2.12 MMOL/L (ref 0.5–2.2)
LEUKOCYTE ESTERASE UR QL STRIP: NEGATIVE
LIPASE SERPL-CCNC: 26 U/L (ref 4–60)
LYMPHOCYTES # BLD AUTO: 1.4 K/UL (ref 1–4.8)
LYMPHOCYTES NFR BLD: 23.1 % (ref 18–48)
MCH RBC QN AUTO: 28.7 PG (ref 27–31)
MCHC RBC AUTO-ENTMCNC: 31.2 G/DL (ref 32–36)
MCV RBC AUTO: 92 FL (ref 82–98)
MICROSCOPIC COMMENT: ABNORMAL
MONOCYTES # BLD AUTO: 0.4 K/UL (ref 0.3–1)
MONOCYTES NFR BLD: 6.1 % (ref 4–15)
NEUTROPHILS # BLD AUTO: 4.1 K/UL (ref 1.8–7.7)
NEUTROPHILS NFR BLD: 68.8 % (ref 38–73)
NITRITE UR QL STRIP: NEGATIVE
NRBC BLD-RTO: 0 /100 WBC
PCO2 BLDA: 39 MMHG (ref 35–45)
PH SMN: 7.31 [PH] (ref 7.35–7.45)
PH UR STRIP: 5 [PH] (ref 5–8)
PLATELET # BLD AUTO: 223 K/UL (ref 150–450)
PMV BLD AUTO: 10.1 FL (ref 9.2–12.9)
PO2 BLDA: 33 MMHG (ref 40–60)
POC BE: -7 MMOL/L
POC SATURATED O2: 58 % (ref 95–100)
POC TCO2: 21 MMOL/L (ref 24–29)
POCT GLUCOSE: 243 MG/DL (ref 70–110)
POCT GLUCOSE: 324 MG/DL (ref 70–110)
POCT GLUCOSE: 364 MG/DL (ref 70–110)
POCT GLUCOSE: >500 MG/DL (ref 70–110)
POTASSIUM SERPL-SCNC: 4 MMOL/L (ref 3.5–5.1)
POTASSIUM SERPL-SCNC: 5.1 MMOL/L (ref 3.5–5.1)
PROT SERPL-MCNC: 7.6 G/DL (ref 6–8.4)
PROT UR QL STRIP: NEGATIVE
RBC # BLD AUTO: 3.62 M/UL (ref 4.6–6.2)
RBC #/AREA URNS AUTO: 49 /HPF (ref 0–4)
SAMPLE: ABNORMAL
SAMPLE: NORMAL
SARS-COV-2 RDRP RESP QL NAA+PROBE: NEGATIVE
SITE: ABNORMAL
SITE: NORMAL
SODIUM SERPL-SCNC: 134 MMOL/L (ref 136–145)
SODIUM SERPL-SCNC: 137 MMOL/L (ref 136–145)
SP GR UR STRIP: 1.02 (ref 1–1.03)
SQUAMOUS #/AREA URNS AUTO: 0 /HPF
TROPONIN I SERPL DL<=0.01 NG/ML-MCNC: 0.03 NG/ML (ref 0–0.03)
URN SPEC COLLECT METH UR: ABNORMAL
WBC # BLD AUTO: 5.92 K/UL (ref 3.9–12.7)
WBC #/AREA URNS AUTO: 7 /HPF (ref 0–5)
YEAST UR QL AUTO: ABNORMAL

## 2022-04-05 PROCEDURE — U0002 COVID-19 LAB TEST NON-CDC: HCPCS | Performed by: PHYSICIAN ASSISTANT

## 2022-04-05 PROCEDURE — S5010 5% DEXTROSE AND 0.45% SALINE: HCPCS

## 2022-04-05 PROCEDURE — 85025 COMPLETE CBC W/AUTO DIFF WBC: CPT | Performed by: PHYSICIAN ASSISTANT

## 2022-04-05 PROCEDURE — 82803 BLOOD GASES ANY COMBINATION: CPT

## 2022-04-05 PROCEDURE — 83036 HEMOGLOBIN GLYCOSYLATED A1C: CPT | Performed by: PHYSICIAN ASSISTANT

## 2022-04-05 PROCEDURE — 99281 EMR DPT VST MAYX REQ PHY/QHP: CPT | Mod: CS,,, | Performed by: PHYSICIAN ASSISTANT

## 2022-04-05 PROCEDURE — 25000003 PHARM REV CODE 250

## 2022-04-05 PROCEDURE — 82962 GLUCOSE BLOOD TEST: CPT

## 2022-04-05 PROCEDURE — 80053 COMPREHEN METABOLIC PANEL: CPT | Performed by: PHYSICIAN ASSISTANT

## 2022-04-05 PROCEDURE — 99285 EMERGENCY DEPT VISIT HI MDM: CPT | Mod: 25

## 2022-04-05 PROCEDURE — 99900035 HC TECH TIME PER 15 MIN (STAT)

## 2022-04-05 PROCEDURE — 96374 THER/PROPH/DIAG INJ IV PUSH: CPT | Mod: 59

## 2022-04-05 PROCEDURE — 81001 URINALYSIS AUTO W/SCOPE: CPT | Performed by: PHYSICIAN ASSISTANT

## 2022-04-05 PROCEDURE — 63600175 PHARM REV CODE 636 W HCPCS: Performed by: STUDENT IN AN ORGANIZED HEALTH CARE EDUCATION/TRAINING PROGRAM

## 2022-04-05 PROCEDURE — 82010 KETONE BODYS QUAN: CPT | Performed by: EMERGENCY MEDICINE

## 2022-04-05 PROCEDURE — 99223 PR INITIAL HOSPITAL CARE,LEVL III: ICD-10-PCS | Mod: AI,GC,, | Performed by: INTERNAL MEDICINE

## 2022-04-05 PROCEDURE — 96375 TX/PRO/DX INJ NEW DRUG ADDON: CPT

## 2022-04-05 PROCEDURE — 80048 BASIC METABOLIC PNL TOTAL CA: CPT | Mod: XB

## 2022-04-05 PROCEDURE — 84484 ASSAY OF TROPONIN QUANT: CPT | Performed by: PHYSICIAN ASSISTANT

## 2022-04-05 PROCEDURE — 83605 ASSAY OF LACTIC ACID: CPT

## 2022-04-05 PROCEDURE — 93005 ELECTROCARDIOGRAM TRACING: CPT

## 2022-04-05 PROCEDURE — 99223 1ST HOSP IP/OBS HIGH 75: CPT | Mod: AI,GC,, | Performed by: INTERNAL MEDICINE

## 2022-04-05 PROCEDURE — 20600001 HC STEP DOWN PRIVATE ROOM

## 2022-04-05 PROCEDURE — 93010 EKG 12-LEAD: ICD-10-PCS | Mod: ,,, | Performed by: INTERNAL MEDICINE

## 2022-04-05 PROCEDURE — 99281 PR EMERGENCY DEPT VISIT,LEVEL I: ICD-10-PCS | Mod: CS,,, | Performed by: PHYSICIAN ASSISTANT

## 2022-04-05 PROCEDURE — 63600175 PHARM REV CODE 636 W HCPCS: Performed by: PHYSICIAN ASSISTANT

## 2022-04-05 PROCEDURE — 93010 ELECTROCARDIOGRAM REPORT: CPT | Mod: ,,, | Performed by: INTERNAL MEDICINE

## 2022-04-05 PROCEDURE — 25000003 PHARM REV CODE 250: Performed by: PHYSICIAN ASSISTANT

## 2022-04-05 PROCEDURE — 83690 ASSAY OF LIPASE: CPT | Performed by: PHYSICIAN ASSISTANT

## 2022-04-05 PROCEDURE — 36415 COLL VENOUS BLD VENIPUNCTURE: CPT

## 2022-04-05 RX ORDER — ACETAMINOPHEN 325 MG/1
650 TABLET ORAL EVERY 4 HOURS PRN
Status: DISCONTINUED | OUTPATIENT
Start: 2022-04-05 | End: 2022-04-10 | Stop reason: HOSPADM

## 2022-04-05 RX ORDER — GABAPENTIN 100 MG/1
200 CAPSULE ORAL 3 TIMES DAILY
Status: DISCONTINUED | OUTPATIENT
Start: 2022-04-05 | End: 2022-04-10 | Stop reason: HOSPADM

## 2022-04-05 RX ORDER — IBUPROFEN 200 MG
16 TABLET ORAL
Status: DISCONTINUED | OUTPATIENT
Start: 2022-04-05 | End: 2022-04-07

## 2022-04-05 RX ORDER — SODIUM CHLORIDE 0.9 % (FLUSH) 0.9 %
10 SYRINGE (ML) INJECTION
Status: DISCONTINUED | OUTPATIENT
Start: 2022-04-05 | End: 2022-04-05

## 2022-04-05 RX ORDER — METOPROLOL SUCCINATE 50 MG/1
50 TABLET, EXTENDED RELEASE ORAL DAILY
Status: DISCONTINUED | OUTPATIENT
Start: 2022-04-06 | End: 2022-04-10 | Stop reason: HOSPADM

## 2022-04-05 RX ORDER — CETIRIZINE HYDROCHLORIDE 10 MG/1
10 TABLET ORAL NIGHTLY
Status: DISCONTINUED | OUTPATIENT
Start: 2022-04-05 | End: 2022-04-10 | Stop reason: HOSPADM

## 2022-04-05 RX ORDER — SODIUM CHLORIDE AND POTASSIUM CHLORIDE 150; 450 MG/100ML; MG/100ML
INJECTION, SOLUTION INTRAVENOUS CONTINUOUS
Status: DISCONTINUED | OUTPATIENT
Start: 2022-04-05 | End: 2022-04-06

## 2022-04-05 RX ORDER — CLOPIDOGREL BISULFATE 75 MG/1
75 TABLET ORAL DAILY
Status: DISCONTINUED | OUTPATIENT
Start: 2022-04-06 | End: 2022-04-10 | Stop reason: HOSPADM

## 2022-04-05 RX ORDER — DEXTROSE MONOHYDRATE 100 MG/ML
INJECTION, SOLUTION INTRAVENOUS
Status: DISCONTINUED | OUTPATIENT
Start: 2022-04-05 | End: 2022-04-05

## 2022-04-05 RX ORDER — SODIUM CHLORIDE 0.9 % (FLUSH) 0.9 %
10 SYRINGE (ML) INJECTION
Status: DISCONTINUED | OUTPATIENT
Start: 2022-04-05 | End: 2022-04-10 | Stop reason: HOSPADM

## 2022-04-05 RX ORDER — ONDANSETRON 2 MG/ML
4 INJECTION INTRAMUSCULAR; INTRAVENOUS
Status: COMPLETED | OUTPATIENT
Start: 2022-04-05 | End: 2022-04-05

## 2022-04-05 RX ORDER — LIDOCAINE 50 MG/G
1 PATCH TOPICAL
Status: DISCONTINUED | OUTPATIENT
Start: 2022-04-05 | End: 2022-04-10 | Stop reason: HOSPADM

## 2022-04-05 RX ORDER — ATORVASTATIN CALCIUM 20 MG/1
40 TABLET, FILM COATED ORAL DAILY
Status: DISCONTINUED | OUTPATIENT
Start: 2022-04-06 | End: 2022-04-10 | Stop reason: HOSPADM

## 2022-04-05 RX ORDER — POLYETHYLENE GLYCOL 3350 17 G/17G
17 POWDER, FOR SOLUTION ORAL DAILY PRN
Status: DISCONTINUED | OUTPATIENT
Start: 2022-04-05 | End: 2022-04-10 | Stop reason: HOSPADM

## 2022-04-05 RX ORDER — AMIODARONE HYDROCHLORIDE 200 MG/1
200 TABLET ORAL DAILY
Status: DISCONTINUED | OUTPATIENT
Start: 2022-04-06 | End: 2022-04-10 | Stop reason: HOSPADM

## 2022-04-05 RX ORDER — TALC
6 POWDER (GRAM) TOPICAL NIGHTLY PRN
Status: DISCONTINUED | OUTPATIENT
Start: 2022-04-05 | End: 2022-04-10 | Stop reason: HOSPADM

## 2022-04-05 RX ORDER — MUPIROCIN 20 MG/G
OINTMENT TOPICAL 2 TIMES DAILY
Status: COMPLETED | OUTPATIENT
Start: 2022-04-06 | End: 2022-04-10

## 2022-04-05 RX ORDER — PANTOPRAZOLE SODIUM 40 MG/1
40 TABLET, DELAYED RELEASE ORAL DAILY
Status: DISCONTINUED | OUTPATIENT
Start: 2022-04-06 | End: 2022-04-10 | Stop reason: HOSPADM

## 2022-04-05 RX ORDER — DEXTROSE MONOHYDRATE 100 MG/ML
INJECTION, SOLUTION INTRAVENOUS
Status: DISCONTINUED | OUTPATIENT
Start: 2022-04-05 | End: 2022-04-06

## 2022-04-05 RX ORDER — FAMOTIDINE 10 MG/ML
20 INJECTION INTRAVENOUS
Status: COMPLETED | OUTPATIENT
Start: 2022-04-05 | End: 2022-04-05

## 2022-04-05 RX ORDER — IBUPROFEN 200 MG
24 TABLET ORAL
Status: DISCONTINUED | OUTPATIENT
Start: 2022-04-05 | End: 2022-04-07

## 2022-04-05 RX ORDER — NALOXONE HCL 0.4 MG/ML
0.02 VIAL (ML) INJECTION
Status: DISCONTINUED | OUTPATIENT
Start: 2022-04-05 | End: 2022-04-10 | Stop reason: HOSPADM

## 2022-04-05 RX ORDER — DEXTROSE MONOHYDRATE AND SODIUM CHLORIDE 5; .45 G/100ML; G/100ML
INJECTION, SOLUTION INTRAVENOUS CONTINUOUS
Status: DISCONTINUED | OUTPATIENT
Start: 2022-04-05 | End: 2022-04-06

## 2022-04-05 RX ORDER — GLUCAGON 1 MG
1 KIT INJECTION
Status: DISCONTINUED | OUTPATIENT
Start: 2022-04-05 | End: 2022-04-07

## 2022-04-05 RX ORDER — LACOSAMIDE 100 MG/1
100 TABLET ORAL EVERY 12 HOURS
Status: DISCONTINUED | OUTPATIENT
Start: 2022-04-05 | End: 2022-04-10 | Stop reason: HOSPADM

## 2022-04-05 RX ORDER — LOSARTAN POTASSIUM 25 MG/1
25 TABLET ORAL DAILY
Status: DISCONTINUED | OUTPATIENT
Start: 2022-04-06 | End: 2022-04-10 | Stop reason: HOSPADM

## 2022-04-05 RX ADMIN — DEXTROSE MONOHYDRATE AND SODIUM CHLORIDE: 5; .45 INJECTION, SOLUTION INTRAVENOUS at 10:04

## 2022-04-05 RX ADMIN — LIDOCAINE 1 PATCH: 50 PATCH CUTANEOUS at 11:04

## 2022-04-05 RX ADMIN — LACOSAMIDE 100 MG: 100 TABLET, FILM COATED ORAL at 09:04

## 2022-04-05 RX ADMIN — FAMOTIDINE 20 MG: 10 INJECTION INTRAVENOUS at 01:04

## 2022-04-05 RX ADMIN — ACETAMINOPHEN 650 MG: 325 TABLET ORAL at 09:04

## 2022-04-05 RX ADMIN — POTASSIUM CHLORIDE AND SODIUM CHLORIDE: 450; 150 INJECTION, SOLUTION INTRAVENOUS at 05:04

## 2022-04-05 RX ADMIN — APIXABAN 5 MG: 5 TABLET, FILM COATED ORAL at 09:04

## 2022-04-05 RX ADMIN — Medication 6 MG: at 09:04

## 2022-04-05 RX ADMIN — INSULIN HUMAN 3 UNITS/HR: 1 INJECTION, SOLUTION INTRAVENOUS at 02:04

## 2022-04-05 RX ADMIN — ONDANSETRON 4 MG: 2 INJECTION INTRAMUSCULAR; INTRAVENOUS at 01:04

## 2022-04-05 RX ADMIN — CETIRIZINE HYDROCHLORIDE 10 MG: 10 TABLET, FILM COATED ORAL at 09:04

## 2022-04-05 RX ADMIN — GABAPENTIN 200 MG: 100 CAPSULE ORAL at 09:04

## 2022-04-05 RX ADMIN — SODIUM CHLORIDE 1000 ML: 0.9 INJECTION, SOLUTION INTRAVENOUS at 02:04

## 2022-04-05 RX ADMIN — SODIUM CHLORIDE 1000 ML: 0.9 INJECTION, SOLUTION INTRAVENOUS at 11:04

## 2022-04-05 NOTE — ED NOTES
"Pt is AAOx4. Pt states he has been feeling progressively more nauseated over the past few days. Denies any vomiting. Pt also reports diarrhea and new incontinence which started around last Thursday. Pt reports taking his insulin last night before bed. Denies fever, cough, chills, SOB. Pt reports that he fell down last week in the shower of a skilled nursing facility -- bandages on ULE. Pt also reports lower back pain prior to falling. Pt connected to continuous cardiac monitoring, BP cuff, and pulse ox. Bed low and locked; side rails up x2; call light within reach. Will continue to monitor.    Patient identifiers for Kamar Muñoz 76 y.o. male checked and correct.  Chief Complaint   Patient presents with    Hyperglycemia     Arrives EMS from home. Pt's home health nurse reports blood sugar was "reading high." With EMS sugar was not readable also. Pt states last night he BG was 218 and took home dose of insulin. Pt has not taken insulin this morning bc he has not eaten.      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 8/2/2012    STEMI involving right coronary artery 1/9/2022    Type II or unspecified type diabetes mellitus with neurological manifestations, uncontrolled(250.62) 3/8/2013    Urinary tract infection      Allergies reported:   Review of patient's allergies indicates:   Allergen Reactions    Iodine      Other reaction(s): swelling  Other reaction(s): Itching  Other reaction(s): Rash     "

## 2022-04-05 NOTE — ED NOTES
CBG at 364. No rate dose change at this time according to the DKA insulin adjustment/rate change instructions.

## 2022-04-05 NOTE — ED NOTES
Pt cleaned of incontinence. Placed in new brief. Condom catheter placed on patient. Provided with new gown and blankets. Will continue to monitor.

## 2022-04-05 NOTE — SUBJECTIVE & OBJECTIVE
"Past Medical History:   Diagnosis Date    Arthritis     Coronary artery disease     Diabetes mellitus type II     Hyperlipidemia     Hypertension     Kidney stone     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 8/2/2012    STEMI involving right coronary artery 1/9/2022    Type II or unspecified type diabetes mellitus with neurological manifestations, uncontrolled(250.62) 3/8/2013    Urinary tract infection        Past Surgical History:   Procedure Laterality Date    BACK SURGERY      CATARACT EXTRACTION W/  INTRAOCULAR LENS IMPLANT Right     Per Dr Romero note 11/2018    COLONOSCOPY N/A 1/28/2019    Procedure: COLONOSCOPY Suprep;  Surgeon: Anh Johnson MD;  Location: [facility] ENDO;  Service: Endoscopy;  Laterality: N/A;    EYE SURGERY      HERNIA REPAIR      LEFT HEART CATHETERIZATION Left 1/9/2022    Procedure: CATHETERIZATION, HEART, LEFT;  Surgeon: Will Hurst III, MD;  Location: [facility] CATH LAB/EP;  Service: Cardiology;  Laterality: Left;    renal stones      SHOULDER OPEN ROTATOR CUFF REPAIR         Review of patient's allergies indicates:   Allergen Reactions    Iodine      Other reaction(s): swelling  Other reaction(s): Itching  Other reaction(s): Rash       No current facility-administered medications on file prior to encounter.     Current Outpatient Medications on File Prior to Encounter   Medication Sig    amiodarone (PACERONE) 200 MG Tab Take 1 tablet (200 mg total) by mouth once daily.    apixaban (ELIQUIS) 5 mg Tab Take 1 tablet (5 mg total) by mouth 2 (two) times daily.    aspirin (ECOTRIN) 81 MG EC tablet Take 81 mg by mouth once daily.    atorvastatin (LIPITOR) 40 MG tablet Take 1 tablet (40 mg total) by mouth once daily.    BD ULTRA-FINE SHORT PEN NEEDLE 31 gauge x 5/16" Ndle 3 (three) times daily.    blood sugar diagnostic Strp 1 strip by Misc.(Non-Drug; Combo Route) route 2 (two) times a day.    cetirizine (ZYRTEC) 10 MG tablet Take 1 tablet (10 mg total) by mouth every evening.    clopidogreL " "(PLAVIX) 75 mg tablet Take 1 tablet (75 mg total) by mouth once daily.    diclofenac sodium (VOLTAREN) 1 % Gel Apply 4 g topically 3 (three) times daily. Apply to sacrun closed skin/buttocks    ergocalciferol (ERGOCALCIFEROL) 50,000 unit Cap Take 1 capsule (50,000 Units total) by mouth every 7 days.    gabapentin (NEURONTIN) 100 MG capsule Take 2 capsules (200 mg total) by mouth 3 (three) times daily.    insulin aspart U-100 (NOVOLOG) 100 unit/mL (3 mL) InPn pen Inject 6 Units into the skin 3 (three) times daily.    insulin glargine (LANTUS) 100 unit/mL injection Inject 12 Units into the skin once daily.    lacosamide (VIMPAT) 100 mg Tab Take 1 tablet (100 mg total) by mouth every 12 (twelve) hours.    lancets (ONETOUCH ULTRASOFT LANCETS) Misc 1 lancet by Misc.(Non-Drug; Combo Route) route 2 (two) times a day.    losartan (COZAAR) 25 MG tablet Take 1 tablet (25 mg total) by mouth once daily.    magnesium oxide (MAG-OX) 400 mg (241.3 mg magnesium) tablet Take 1 tablet (400 mg total) by mouth 2 (two) times daily.    metFORMIN (GLUCOPHAGE) 1000 MG tablet Take 1 tablet (1,000 mg total) by mouth 2 (two) times daily with meals.    metoprolol succinate (TOPROL-XL) 50 MG 24 hr tablet Take 1 tablet (50 mg total) by mouth once daily.    MULTIVITAMIN ORAL Take 1 tablet by mouth once daily.     neomycin-bacitracin-polymyxin (NEOSPORIN) ointment Apply topically 2 (two) times daily.    nitroGLYCERIN (NITROSTAT) 0.4 MG SL tablet Place 1 tablet (0.4 mg total) under the tongue every 5 (five) minutes as needed for Chest pain.    oxyCODONE (ROXICODONE) 5 MG immediate release tablet Take 1 tablet (5 mg total) by mouth every 6 (six) hours as needed for Pain.    pantoprazole (PROTONIX) 40 MG tablet Take 1 tablet (40 mg total) by mouth once daily.    pen needle, diabetic (PEN NEEDLE) 30 gauge x 5/16" Ndle 1 Units by Misc.(Non-Drug; Combo Route) route 3 (three) times daily.    polycarbophil (FIBERCON) 625 mg tablet Take 1 tablet by mouth " once daily.     senna-docusate 8.6-50 mg (PERICOLACE) 8.6-50 mg per tablet Take 1 tablet by mouth once daily.    sucralfate (CARAFATE) 1 gram tablet Take 1 tablet (1 g total) by mouth 3 (three) times daily before meals.    [DISCONTINUED] albuterol (PROVENTIL/VENTOLIN HFA) 90 mcg/actuation inhaler Inhale 2 puffs into the lungs every 4 (four) hours as needed for Wheezing (Pt states he will take it on his own. MDI placed by bedside.). Rescue    [DISCONTINUED] diltiaZEM (CARDIZEM CD) 120 MG Cp24 Take 1 capsule (120 mg total) by mouth once daily.     Family History       Problem Relation (Age of Onset)    Diabetes Father          Tobacco Use    Smoking status: Former Smoker     Packs/day: 1.50     Years: 25.00     Pack years: 37.50     Quit date: 1983     Years since quittin.2    Smokeless tobacco: Never Used   Substance and Sexual Activity    Alcohol use: No    Drug use: No    Sexual activity: Yes     Partners: Female     Review of Systems   Constitutional:  Negative for chills and fever.   HENT: Negative.     Eyes: Negative.    Respiratory:  Negative for cough and shortness of breath.    Cardiovascular:  Negative for chest pain, palpitations and leg swelling.   Gastrointestinal:  Positive for diarrhea and nausea. Negative for abdominal pain, constipation and vomiting.   Endocrine: Positive for polyuria.   Genitourinary:  Negative for dysuria and hematuria.   Musculoskeletal:  Positive for back pain (sacral). Negative for arthralgias.   Skin:  Positive for wound.   Neurological:  Positive for weakness. Negative for dizziness and headaches.   Hematological: Negative.    Psychiatric/Behavioral: Negative.     Objective:     Vital Signs (Most Recent):  Temp: 97.4 °F (36.3 °C) (22 1126)  Pulse: 71 (22 1354)  Resp: 15 (22 1349)  BP: (!) 116/58 (22 1354)  SpO2: 99 % (22 1354)   Vital Signs (24h Range):  Temp:  [97.4 °F (36.3 °C)-98 °F (36.7 °C)] 97.4 °F (36.3 °C)  Pulse:  [68-72]  71  Resp:  [15-18] 15  SpO2:  [98 %-99 %] 99 %  BP: (116-140)/(58-80) 116/58     Weight: 75 kg (165 lb 5.6 oz)  Body mass index is 21.23 kg/m².    Physical Exam  Constitutional:       Appearance: Normal appearance.   HENT:      Head: Normocephalic and atraumatic.      Nose: Nose normal.      Mouth/Throat:      Mouth: Mucous membranes are dry.   Eyes:      General: No scleral icterus.  Cardiovascular:      Rate and Rhythm: Normal rate and regular rhythm.      Pulses: Normal pulses.      Heart sounds: Normal heart sounds. No murmur heard.  Pulmonary:      Effort: Pulmonary effort is normal. No respiratory distress.      Breath sounds: Normal breath sounds. No wheezing or rales.   Abdominal:      General: Bowel sounds are normal. There is no distension.      Palpations: Abdomen is soft.   Musculoskeletal:         General: Normal range of motion.      Cervical back: Normal range of motion. No rigidity.      Right lower leg: No edema.      Left lower leg: No edema.   Skin:     General: Skin is dry.          Neurological:      General: No focal deficit present.      Mental Status: He is alert and oriented to person, place, and time.      Motor: No weakness.   Psychiatric:         Mood and Affect: Mood normal.         Behavior: Behavior normal.           Significant Labs: All pertinent labs within the past 24 hours have been reviewed.  CBC:   Recent Labs   Lab 04/05/22  1156   WBC 5.92   HGB 10.4*   HCT 33.3*        CMP:   Recent Labs   Lab 04/05/22  1156   *   K 5.1   CL 96   CO2 14*   *   BUN 34*   CREATININE 1.4   CALCIUM 9.4   PROT 7.6   ALBUMIN 3.1*   BILITOT 0.6   ALKPHOS 131   AST 19   ALT 14   ANIONGAP 24*   EGFRNONAA 47.8*     Lactic Acid: No results for input(s): LACTATE in the last 48 hours.  Magnesium: No results for input(s): MG in the last 48 hours.  POCT Glucose:   Recent Labs   Lab 04/05/22  1430 04/05/22  1511 04/05/22  1725   POCTGLUCOSE >500* >500* 364*       Significant Imaging: I  have reviewed all pertinent imaging results/findings within the past 24 hours.

## 2022-04-05 NOTE — ED PROVIDER NOTES
"Encounter Date: 4/5/2022       History     Chief Complaint   Patient presents with    Hyperglycemia     Arrives EMS from home. Pt's home health nurse reports blood sugar was "reading high." With EMS sugar was not readable also. Pt states last night he BG was 218 and took home dose of insulin. Pt has not taken insulin this morning bc he has not eaten.      This is a 76 year old male with a PMH of HTN, DM, CAD presenting via EMS with hyperglycemia. He reports poor glucose control "it's all over the place." Home health came this morning and registered glucose >500. He reports nausea and weakness. He took Lantus yesterday morning and 9 units of novolog last night along with 3 peanut butter and jelly sandwiches. He hasn't eaten yet today and has not taken insulin. He denies fever, chills, URI symptoms, chest pain, SOB, vomiting, abdominal pain, dysuria, diarrhea, focal weakness.         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;  Service: Endoscopy;  Laterality: N/A;    EYE SURGERY      HERNIA REPAIR      LEFT HEART CATHETERIZATION Left 1/9/2022    Procedure: CATHETERIZATION, HEART, LEFT;  Surgeon: Will OROZCO" Aris HEDRICK MD;  Location: [facility] CATH LAB/EP;  Service: Cardiology;  Laterality: Left;    renal stones      SHOULDER OPEN ROTATOR CUFF REPAIR       Family History   Problem Relation Age of Onset    Diabetes Father     Prostate cancer Neg Hx     Kidney disease Neg Hx      Social History     Tobacco Use    Smoking status: Former Smoker     Packs/day: 1.50     Years: 25.00     Pack years: 37.50     Quit date: 1983     Years since quittin.2    Smokeless tobacco: Never Used   Substance Use Topics    Alcohol use: No    Drug use: No     Review of Systems   Constitutional: Negative for chills and fever.   HENT: Negative for sore throat.    Respiratory: Negative for shortness of breath.    Cardiovascular: Negative for chest pain.   Gastrointestinal: Positive for nausea. Negative for abdominal pain and vomiting.   Endocrine: Positive for polyuria.   Genitourinary: Negative for dysuria.   Musculoskeletal: Positive for back pain (at baseline).   Skin: Negative for rash.   Neurological: Positive for weakness.   Hematological: Does not bruise/bleed easily.       Physical Exam     Initial Vitals [22 1101]   BP Pulse Resp Temp SpO2   (!) 140/80 68 18 98 °F (36.7 °C) 98 %      MAP       --         Physical Exam    ED Course   Procedures  Labs Reviewed   CBC W/ AUTO DIFFERENTIAL - Abnormal; Notable for the following components:       Result Value    RBC 3.62 (*)     Hemoglobin 10.4 (*)     Hematocrit 33.3 (*)     MCHC 31.2 (*)     RDW 16.0 (*)     All other components within normal limits   COMPREHENSIVE METABOLIC PANEL - Abnormal; Notable for the following components:    Sodium 134 (*)     CO2 14 (*)     Glucose 626 (*)     BUN 34 (*)     Albumin 3.1 (*)     Anion Gap 24 (*)     eGFR if  55.2 (*)     eGFR if non  47.8 (*)     All other components within normal limits    Narrative:     Add on per Dr. Mart Snell    URINALYSIS, REFLEX TO URINE CULTURE - Abnormal; Notable  for the following components:    Glucose, UA 3+ (*)     Ketones, UA 3+ (*)     Occult Blood UA 3+ (*)     All other components within normal limits    Narrative:     Specimen Source->Urine   BETA - HYDROXYBUTYRATE, SERUM - Abnormal; Notable for the following components:    Beta-Hydroxybutyrate 5.9 (*)     All other components within normal limits   URINALYSIS MICROSCOPIC - Abnormal; Notable for the following components:    RBC, UA 49 (*)     WBC, UA 7 (*)     Yeast, UA Occasional (*)     All other components within normal limits    Narrative:     Specimen Source->Urine   ISTAT PROCEDURE - Abnormal; Notable for the following components:    POC PH 7.309 (*)     POC PO2 33 (*)     POC HCO3 19.6 (*)     POC SATURATED O2 58 (*)     POC TCO2 21 (*)     All other components within normal limits   POCT GLUCOSE - Abnormal; Notable for the following components:    POCT Glucose >500 (*)     All other components within normal limits   POCT GLUCOSE - Abnormal; Notable for the following components:    POCT Glucose >500 (*)     All other components within normal limits   POCT GLUCOSE - Abnormal; Notable for the following components:    POCT Glucose >500 (*)     All other components within normal limits   POCT GLUCOSE - Abnormal; Notable for the following components:    POCT Glucose >500 (*)     All other components within normal limits   POCT GLUCOSE - Abnormal; Notable for the following components:    POCT Glucose >500 (*)     All other components within normal limits   POCT GLUCOSE - Abnormal; Notable for the following components:    POCT Glucose 364 (*)     All other components within normal limits   SARS-COV-2 RDRP GENE - Normal    Narrative:     This test utilizes isothermal nucleic acid amplification   technology to detect the SARS-CoV-2 RdRp nucleic acid segment.   The analytical sensitivity (limit of detection) is 125 genome   equivalents/mL.   A POSITIVE result implies infection with the SARS-CoV-2 virus;   the patient is  presumed to be contagious.     A NEGATIVE result means that SARS-CoV-2 nucleic acids are not   present above the limit of detection. A NEGATIVE result should be   treated as presumptive. It does not rule out the possibility of   COVID-19 and should not be the sole basis for treatment decisions.   If COVID-19 is strongly suspected based on clinical and exposure   history, re-testing using an alternate molecular assay should be   considered.   This test is only for use under the Food and Drug   Administration s Emergency Use Authorization (EUA).   Commercial kits are provided by [employer].   Performance characteristics of the EUA have been independently   verified by [facility] Department of   Pathology and Laboratory Medicine.   _________________________________________________________________   The authorized Fact Sheet for Healthcare Providers and the authorized Fact   Sheet for Patients of the ID NOW COVID-19 are available on the FDA   website:     https://www.fda.gov/media/070254/download  https://www.fda.gov/media/946785/download          LIPASE    Narrative:     Add on per Dr. Mart Snell    TROPONIN I   TROPONIN I    Narrative:     Add on per Dr. Mart Snell    HEMOGLOBIN A1C   BETA - HYDROXYBUTYRATE, SERUM   BASIC METABOLIC PANEL   BASIC METABOLIC PANEL   BASIC METABOLIC PANEL   HEMOGLOBIN A1C   ISTAT LACTATE   POCT GLUCOSE MONITORING CONTINUOUS        ECG Results          EKG 12-lead (In process)  Result time 04/05/22 12:41:48    In process by Interface, Lab In [facility] (04/05/22 12:41:48)                 Narrative:    Test Reason : R73.9,    Vent. Rate : 070 BPM     Atrial Rate : 070 BPM     P-R Int : 168 ms          QRS Dur : 104 ms      QT Int : 414 ms       P-R-T Axes : 078 065 019 degrees     QTc Int : 447 ms    Age and gender specific analysis  Sinus rhythm with occasional Premature ventricular complexes  Otherwise normal ECG  When compared with ECG of 25-MAR-2022  07:23,  Premature ventricular complexes are now Present  Nonspecific T wave abnormality has replaced inverted T waves in Inferior  leads    Referred By: [email or web address]   SELF           Confirmed By:                             Imaging Results    None          Medications   insulin regular in 0.9 % NaCl 100 unit/100 mL (1 unit/mL) infusion (4 Units/hr Intravenous Rate/Dose Change 4/5/22 1620)   sodium chloride 0.9% flush 10 mL (has no administration in time range)   dextrose 10% bolus 250 mL (has no administration in time range)   dextrose 10 % infusion (has no administration in time range)   dextrose 10% bolus 125 mL (has no administration in time range)   dextrose 10 % infusion (has no administration in time range)   naloxone 0.4 mg/mL injection 0.02 mg (has no administration in time range)   glucose chewable tablet 16 g (has no administration in time range)   glucose chewable tablet 24 g (has no administration in time range)   glucagon (human recombinant) injection 1 mg (has no administration in time range)   acetaminophen tablet 650 mg (has no administration in time range)   melatonin tablet 6 mg (has no administration in time range)   0.45 % NaCl with KCl 20 mEq infusion ( Intravenous New Bag 4/5/22 9058)   amiodarone tablet 200 mg (has no administration in time range)   apixaban tablet 5 mg (has no administration in time range)   atorvastatin tablet 40 mg (has no administration in time range)   cetirizine tablet 10 mg (has no administration in time range)   clopidogreL tablet 75 mg (has no administration in time range)   gabapentin capsule 200 mg (has no administration in time range)   lacosamide tablet 100 mg (has no administration in time range)   losartan tablet 25 mg (has no administration in time range)   metoprolol succinate (TOPROL-XL) 24 hr tablet 50 mg (has no administration in time range)   pantoprazole EC tablet 40 mg (has no administration in time range)   sodium chloride 0.9% bolus 1,000 mL (0 mLs  Intravenous Stopped 4/5/22 1351)   ondansetron injection 4 mg (4 mg Intravenous Given 4/5/22 1329)   famotidine (PF) injection 20 mg (20 mg Intravenous Given 4/5/22 1329)   sodium chloride 0.9% bolus 1,000 mL (0 mLs Intravenous Stopped 4/5/22 1640)   insulin regular bolus from bag/infusion 7.5 Units (7.5 Units Intravenous Bolus from Bag 4/5/22 1435)           APC / Resident Notes:   76 y.o. year old male presenting with hyperglycemia.    DDx includes but is not limited to hyperglycemia, DKA, dehydration, underlying infection.    ED workup reveals glucose > 500  PH 7.30, HCO3 19, lactate 2.12  WBC normal , H&H 10/33, Na 134, CO2 14, glucose 626, BUN 34, anion gap 24  Urinalysis +ketones, negative leukocytes    Plan  IVFs  Insulin drip  Admit to medicine    Discussed findings and plan with patient who verbalized understanding and agrees with the plan and course of treatment. I discussed the care of this patient with my supervising physician.         Attending Attestation:     Physician Attestation Statement for NP/PA:   I discussed this assessment and plan of this patient with the NP/PA, but I did not personally examine the patient. The face to face encounter was performed by the NP/PA.    Other NP/PA Attestation Additions:      Medical Decision Making: Recommend admission for further workup and insulin drip given low bicarb and elevated anion gap.             ED Course as of 04/05/22 1750   Tue Apr 05, 2022   1325 Potassium: 5.1 [JW]      ED Course User Index  [JW] Mart Snell MD             Clinical Impression:   Final diagnoses:  [R73.9] Hyperglycemia  [E13.10] Diabetic ketoacidosis without coma associated with other specified diabetes mellitus (Primary)          ED Disposition Condition    Admit               DMITRY LeungC  04/05/22 1750       Mart Snell MD  04/06/22 7282

## 2022-04-05 NOTE — ASSESSMENT & PLAN NOTE
IDDM  Currently poorly controlled  Meds: Metformin 1000 BID, insulin aspart 6 TID, insulin glargine 12 daily    Lab Results   Component Value Date    HGBA1C 11.5 (H) 01/26/2022     - insulin drip  - will transition to SubQ insulin once able  - consider SGLT-2 inhibitor at discharge  - endocrinology referral at discharge

## 2022-04-05 NOTE — ASSESSMENT & PLAN NOTE
Patient with Paroxysmal (<7 days) atrial fibrillation which is controlled currently with Amiodarone. Patient is currently in sinus rhythm.[id number] Score: 4. Anticoagulation indicated. Anticoagulation done with eliquis.

## 2022-04-05 NOTE — ASSESSMENT & PLAN NOTE
Hx of RCA STEMI in Jan s/p LAUREN placement on 1/9/22. Was on ASA/Plavix at home    - Will continue plavix only given that he is on apixaban since it's been greater than 1 month since LAUREN placement  - No need for triple therapy (ASA/Plavix/eliquis) beyond 1 month

## 2022-04-05 NOTE — TELEPHONE ENCOUNTER
----- Message from Miranda Akers sent at 4/5/2022  9:22 AM CDT -----  Contact: Jabari  Type:  Patient Returning Call    Who Called:Rafi boyd/ [facility]  Would the patient rather a call back or a response via [facility]? Call   Best Call Back Number:  Additional Information:     Pt blood sugar is 600 hh nurse told him to go to hospital  Jabari stated it was jay going on 3 days now  Mat would like a call back now

## 2022-04-05 NOTE — ASSESSMENT & PLAN NOTE
DKA likely 2/2 to inadequate insulin regimen/diet noncompliance/difficulty managing meds at home given that his wife is also ill. Poorly controlled at SNF prior to DC, and likely continuation of that. No evidence of UTI or pneumonia at this time. Does not meet SIRdS/sepsis criteria. Possible source may be sacral injury, but does not appear infected at this time.     -- insulin gtt, titrate per DKA protocol  -- 0.45% NS with KCl 20 mEq  -- poct glucose q1hr while on gtt  -- BMP q4hr  -- F/u HbA1C  -- bariatric clear liquid diet  -- Will transition to basal and bolus insulin once AG closed and acidosis is resolved. Will overlap basal and drip by 2 hours.

## 2022-04-05 NOTE — ASSESSMENT & PLAN NOTE
Normotensive on admission.     - continue home dose metoprolol succinate 50 daily and losartan 25 daily

## 2022-04-05 NOTE — HPI
Mr. 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 He reports that his home BG runs in 300 and 500s. EMS found his with BG unreadable. Patient states that he took his insulin last night. He took 9 units novolog last night. As of this morning, he has not eaten and did not take his morning insulin. He reports nausea and weakness. Denies chest pain, SOB, palpitations, fever, chills, abdominal pain, constipation. Reports some diarrhea that started in the SNF.     He reports eating toast, cereal, PB&J sandwiches, uses sweet and low for coffee. Denies consuming excessively starchy or sugary foods.     The patient was recently discharged from SNF after being transferred there following his hospitalization for Covid with respiratory failure, DKA, metabolic encephalopathy, requiring ICU admission. He was admitted for weakness/debility and continued insulin adjustments. Admission to SNF was for secondary weakness debility, continued insulin adjustments. BG had been labile during his SNF stay and difficult to control with hypoglycemic events as well. Patient was discharged with insulin aspart 6 TID and glargine 12 units daily with sliding scale.     In the ED, significant labs include BG of 626, bicarb 14, BNB 5.9, UA with 3+ ketones, 3+ glucose, occult blood. VBG showed mild acidosis with pH of 7.3. He received insulin regular bolus, zofran, NS 1 L x2, and was started on insulin drip.

## 2022-04-05 NOTE — ASSESSMENT & PLAN NOTE
CAD s/p LAUREN to RCA after STEMI in Jan 2022.     - DC aspirin  - Continue statin, plavix, eliquis

## 2022-04-05 NOTE — H&P
"[facility] - Emergency Dept  [facility] Medicine  History & Physical    Patient Name: Kamar Muñoz  MRN: [id number]  Patient Class: IP- Inpatient  Admission Date: 4/5/2022  Attending Physician: Orville Booth MD   Primary Care Provider: Basim Guerrero MD         Patient information was obtained from patient, past medical records and ER records.     Subjective:     Principal Problem:Diabetic ketoacidosis without coma associated with type 2 diabetes mellitus    Chief Complaint:   Chief Complaint   Patient presents with    Hyperglycemia     Arrives EMS from home. Pt's home health nurse reports blood sugar was "reading high." With EMS sugar was not readable also. Pt states last night he BG was 218 and took home dose of insulin. Pt has not taken insulin this morning bc he has not eaten.         HPI: Mr. 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 He reports that his home BG runs in 300 and 500s. EMS found his with BG unreadable. Patient states that he took his insulin last night. He took 9 units novolog last night. As of this morning, he has not eaten and did not take his morning insulin. He reports nausea and weakness. Denies chest pain, SOB, palpitations, fever, chills, abdominal pain, constipation. Reports some diarrhea that started in the SNF.     He reports eating toast, cereal, PB&J sandwiches, uses sweet and low for coffee. Denies consuming excessively starchy or sugary foods.     The patient was recently discharged from SNF after being transferred there following his hospitalization for Covid with respiratory failure, DKA, metabolic encephalopathy, requiring ICU admission. He was admitted for weakness/debility and continued insulin adjustments. Admission to SNF was for secondary weakness debility, " continued insulin adjustments. BG had been labile during his SNF stay and difficult to control with hypoglycemic events as well. Patient was discharged with insulin aspart 6 TID and glargine 12 units daily with sliding scale.     In the ED, significant labs include BG of 626, bicarb 14, BNB 5.9, UA with 3+ ketones, 3+ glucose, occult blood. VBG showed mild acidosis with pH of 7.3. He received insulin regular bolus, zofran, NS 1 L x2, and was started on insulin drip.                  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 8/2/2012    STEMI involving right coronary artery 1/9/2022    Type II or unspecified type diabetes mellitus with neurological manifestations, uncontrolled(250.62) 3/8/2013    Urinary tract infection        Past Surgical History:   Procedure Laterality Date    BACK SURGERY      CATARACT EXTRACTION W/  INTRAOCULAR LENS IMPLANT Right     Per Dr Romero note 11/2018    COLONOSCOPY N/A 1/28/2019    Procedure: COLONOSCOPY Suprep;  Surgeon: Anh Johnson MD;  Location: [facility] ENDO;  Service: Endoscopy;  Laterality: N/A;    EYE SURGERY      HERNIA REPAIR      LEFT HEART CATHETERIZATION Left 1/9/2022    Procedure: CATHETERIZATION, HEART, LEFT;  Surgeon: Will Hurst III, MD;  Location: [facility] CATH LAB/EP;  Service: Cardiology;  Laterality: Left;    renal stones      SHOULDER OPEN ROTATOR CUFF REPAIR         Review of patient's allergies indicates:   Allergen Reactions    Iodine      Other reaction(s): swelling  Other reaction(s): Itching  Other reaction(s): Rash       No current facility-administered medications on file prior to encounter.     Current Outpatient Medications on File Prior to Encounter   Medication Sig    amiodarone (PACERONE) 200 MG Tab Take 1 tablet (200 mg total) by mouth once daily.    apixaban  "(ELIQUIS) 5 mg Tab Take 1 tablet (5 mg total) by mouth 2 (two) times daily.    aspirin (ECOTRIN) 81 MG EC tablet Take 81 mg by mouth once daily.    atorvastatin (LIPITOR) 40 MG tablet Take 1 tablet (40 mg total) by mouth once daily.    BD ULTRA-FINE SHORT PEN NEEDLE 31 gauge x 5/16" Ndle 3 (three) times daily.    blood sugar diagnostic Strp 1 strip by Misc.(Non-Drug; Combo Route) route 2 (two) times a day.    cetirizine (ZYRTEC) 10 MG tablet Take 1 tablet (10 mg total) by mouth every evening.    clopidogreL (PLAVIX) 75 mg tablet Take 1 tablet (75 mg total) by mouth once daily.    diclofenac sodium (VOLTAREN) 1 % Gel Apply 4 g topically 3 (three) times daily. Apply to sacrun closed skin/buttocks    ergocalciferol (ERGOCALCIFEROL) 50,000 unit Cap Take 1 capsule (50,000 Units total) by mouth every 7 days.    gabapentin (NEURONTIN) 100 MG capsule Take 2 capsules (200 mg total) by mouth 3 (three) times daily.    insulin aspart U-100 (NOVOLOG) 100 unit/mL (3 mL) InPn pen Inject 6 Units into the skin 3 (three) times daily.    insulin glargine (LANTUS) 100 unit/mL injection Inject 12 Units into the skin once daily.    lacosamide (VIMPAT) 100 mg Tab Take 1 tablet (100 mg total) by mouth every 12 (twelve) hours.    lancets (ONETOUCH ULTRASOFT LANCETS) Misc 1 lancet by Misc.(Non-Drug; Combo Route) route 2 (two) times a day.    losartan (COZAAR) 25 MG tablet Take 1 tablet (25 mg total) by mouth once daily.    magnesium oxide (MAG-OX) 400 mg (241.3 mg magnesium) tablet Take 1 tablet (400 mg total) by mouth 2 (two) times daily.    metFORMIN (GLUCOPHAGE) 1000 MG tablet Take 1 tablet (1,000 mg total) by mouth 2 (two) times daily with meals.    metoprolol succinate (TOPROL-XL) 50 MG 24 hr tablet Take 1 tablet (50 mg total) by mouth once daily.    MULTIVITAMIN ORAL Take 1 tablet by mouth once daily.     neomycin-bacitracin-polymyxin (NEOSPORIN) ointment Apply topically 2 (two) times daily.    nitroGLYCERIN " "(NITROSTAT) 0.4 MG SL tablet Place 1 tablet (0.4 mg total) under the tongue every 5 (five) minutes as needed for Chest pain.    oxyCODONE (ROXICODONE) 5 MG immediate release tablet Take 1 tablet (5 mg total) by mouth every 6 (six) hours as needed for Pain.    pantoprazole (PROTONIX) 40 MG tablet Take 1 tablet (40 mg total) by mouth once daily.    pen needle, diabetic (PEN NEEDLE) 30 gauge x 5/16" Ndle 1 Units by Misc.(Non-Drug; Combo Route) route 3 (three) times daily.    polycarbophil (FIBERCON) 625 mg tablet Take 1 tablet by mouth once daily.     senna-docusate 8.6-50 mg (PERICOLACE) 8.6-50 mg per tablet Take 1 tablet by mouth once daily.    sucralfate (CARAFATE) 1 gram tablet Take 1 tablet (1 g total) by mouth 3 (three) times daily before meals.    [DISCONTINUED] albuterol (PROVENTIL/VENTOLIN HFA) 90 mcg/actuation inhaler Inhale 2 puffs into the lungs every 4 (four) hours as needed for Wheezing (Pt states he will take it on his own. MDI placed by bedside.). Rescue    [DISCONTINUED] diltiaZEM (CARDIZEM CD) 120 MG Cp24 Take 1 capsule (120 mg total) by mouth once daily.     Family History       Problem Relation (Age of Onset)    Diabetes Father          Tobacco Use    Smoking status: Former Smoker     Packs/day: 1.50     Years: 25.00     Pack years: 37.50     Quit date: 1983     Years since quittin.2    Smokeless tobacco: Never Used   Substance and Sexual Activity    Alcohol use: No    Drug use: No    Sexual activity: Yes     Partners: Female     Review of Systems   Constitutional:  Negative for chills and fever.   HENT: Negative.     Eyes: Negative.    Respiratory:  Negative for cough and shortness of breath.    Cardiovascular:  Negative for chest pain, palpitations and leg swelling.   Gastrointestinal:  Positive for diarrhea and nausea. Negative for abdominal pain, constipation and vomiting.   Endocrine: Positive for polyuria.   Genitourinary:  Negative for dysuria and hematuria. "   Musculoskeletal:  Positive for back pain (sacral). Negative for arthralgias.   Skin:  Positive for wound.   Neurological:  Positive for weakness. Negative for dizziness and headaches.   Hematological: Negative.    Psychiatric/Behavioral: Negative.     Objective:     Vital Signs (Most Recent):  Temp: 97.4 °F (36.3 °C) (04/05/22 1126)  Pulse: 71 (04/05/22 1354)  Resp: 15 (04/05/22 1349)  BP: (!) 116/58 (04/05/22 1354)  SpO2: 99 % (04/05/22 1354)   Vital Signs (24h Range):  Temp:  [97.4 °F (36.3 °C)-98 °F (36.7 °C)] 97.4 °F (36.3 °C)  Pulse:  [68-72] 71  Resp:  [15-18] 15  SpO2:  [98 %-99 %] 99 %  BP: (116-140)/(58-80) 116/58     Weight: 75 kg (165 lb 5.6 oz)  Body mass index is 21.23 kg/m².    Physical Exam  Constitutional:       Appearance: Normal appearance.   HENT:      Head: Normocephalic and atraumatic.      Nose: Nose normal.      Mouth/Throat:      Mouth: Mucous membranes are dry.   Eyes:      General: No scleral icterus.  Cardiovascular:      Rate and Rhythm: Normal rate and regular rhythm.      Pulses: Normal pulses.      Heart sounds: Normal heart sounds. No murmur heard.  Pulmonary:      Effort: Pulmonary effort is normal. No respiratory distress.      Breath sounds: Normal breath sounds. No wheezing or rales.   Abdominal:      General: Bowel sounds are normal. There is no distension.      Palpations: Abdomen is soft.   Musculoskeletal:         General: Normal range of motion.      Cervical back: Normal range of motion. No rigidity.      Right lower leg: No edema.      Left lower leg: No edema.   Skin:     General: Skin is dry.          Neurological:      General: No focal deficit present.      Mental Status: He is alert and oriented to person, place, and time.      Motor: No weakness.   Psychiatric:         Mood and Affect: Mood normal.         Behavior: Behavior normal.           Significant Labs: All pertinent labs within the past 24 hours have been reviewed.  CBC:   Recent Labs   Lab 04/05/22  1156    WBC 5.92   HGB 10.4*   HCT 33.3*        CMP:   Recent Labs   Lab 04/05/22  1156   *   K 5.1   CL 96   CO2 14*   *   BUN 34*   CREATININE 1.4   CALCIUM 9.4   PROT 7.6   ALBUMIN 3.1*   BILITOT 0.6   ALKPHOS 131   AST 19   ALT 14   ANIONGAP 24*   EGFRNONAA 47.8*     Lactic Acid: No results for input(s): LACTATE in the last 48 hours.  Magnesium: No results for input(s): MG in the last 48 hours.  POCT Glucose:   Recent Labs   Lab 04/05/22  1430 04/05/22  1511 04/05/22  1725   POCTGLUCOSE >500* >500* 364*       Significant Imaging: I have reviewed all pertinent imaging results/findings within the past 24 hours.    Assessment/Plan:     * Diabetic ketoacidosis without coma associated with type 2 diabetes mellitus  DKA likely 2/2 to inadequate insulin regimen/diet noncompliance/difficulty managing meds at home given that his wife is also ill. Poorly controlled at SNF prior to DC, and likely continuation of that. No evidence of UTI or pneumonia at this time. Does not meet SIRdS/sepsis criteria. Possible source may be sacral injury, but does not appear infected at this time.     -- insulin gtt, titrate per DKA protocol  -- 0.45% NS with KCl 20 mEq  -- poct glucose q1hr while on gtt  -- BMP q4hr  -- F/u HbA1C  -- bariatric clear liquid diet  -- Will transition to basal and bolus insulin once AG closed and acidosis is resolved. Will overlap basal and drip by 2 hours.         Focal seizures  Continue home lacosamide      History of ST elevation myocardial infarction (STEMI)  Hx of RCA STEMI in [facility] s/p LAUREN placement on 1/9/22. Was on ASA/Plavix at home    - Will continue plavix only given that he is on apixaban since it's been greater than 1 month since LAUREN placement  - No need for triple therapy (ASA/Plavix/eliquis) beyond 1 month      Paroxysmal atrial fibrillation  Patient with Paroxysmal (<7 days) atrial fibrillation which is controlled currently with Amiodarone. Patient is currently in sinus  rhythm.[id number] Score: 4. Anticoagulation indicated. Anticoagulation done with eliquis.        Embolic stroke involving right middle cerebral artery  Hx of stroke in Jan 2022.     - continue home lipitor  - PT/OT for residual weakness/debilty     Coronary artery disease involving native coronary artery of native heart with unstable angina pectoris  CAD s/p LAUREN to RCA after STEMI in Jan 2022.     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     - continue home dose metoprolol succinate 50 daily and losartan 25 daily        VTE Risk Mitigation (From admission, onward)         Ordered     apixaban tablet 5 mg  2 times daily         04/05/22 1601     IP VTE HIGH RISK PATIENT  Once         04/05/22 1553     Place sequential compression device  Until discontinued         04/05/22 1553     Place sequential compression device  Until discontinued         04/05/22 1547                   Ange Alexander MD  Department of Hospital Medicine   [facility] - Emergency Dept

## 2022-04-06 PROBLEM — E43 SEVERE MALNUTRITION: Status: ACTIVE | Noted: 2022-04-06

## 2022-04-06 LAB
ANION GAP SERPL CALC-SCNC: 10 MMOL/L (ref 8–16)
ANION GAP SERPL CALC-SCNC: 8 MMOL/L (ref 8–16)
ANION GAP SERPL CALC-SCNC: 9 MMOL/L (ref 8–16)
BASOPHILS # BLD AUTO: 0.07 K/UL (ref 0–0.2)
BASOPHILS NFR BLD: 0.9 % (ref 0–1.9)
BUN SERPL-MCNC: 23 MG/DL (ref 8–23)
BUN SERPL-MCNC: 25 MG/DL (ref 8–23)
BUN SERPL-MCNC: 25 MG/DL (ref 8–23)
CALCIUM SERPL-MCNC: 8.6 MG/DL (ref 8.7–10.5)
CALCIUM SERPL-MCNC: 8.6 MG/DL (ref 8.7–10.5)
CALCIUM SERPL-MCNC: 8.8 MG/DL (ref 8.7–10.5)
CHLORIDE SERPL-SCNC: 105 MMOL/L (ref 95–110)
CHLORIDE SERPL-SCNC: 107 MMOL/L (ref 95–110)
CHLORIDE SERPL-SCNC: 107 MMOL/L (ref 95–110)
CO2 SERPL-SCNC: 25 MMOL/L (ref 23–29)
CO2 SERPL-SCNC: 26 MMOL/L (ref 23–29)
CO2 SERPL-SCNC: 27 MMOL/L (ref 23–29)
CREAT SERPL-MCNC: 1 MG/DL (ref 0.5–1.4)
CREAT SERPL-MCNC: 1.1 MG/DL (ref 0.5–1.4)
CREAT SERPL-MCNC: 1.1 MG/DL (ref 0.5–1.4)
DIFFERENTIAL METHOD: ABNORMAL
EOSINOPHIL # BLD AUTO: 0.5 K/UL (ref 0–0.5)
EOSINOPHIL NFR BLD: 6.5 % (ref 0–8)
ERYTHROCYTE [DISTWIDTH] IN BLOOD BY AUTOMATED COUNT: 16.7 % (ref 11.5–14.5)
EST. GFR  (AFRICAN AMERICAN): >60 ML/MIN/1.73 M^2
EST. GFR  (NON AFRICAN AMERICAN): >60 ML/MIN/1.73 M^2
GLUCOSE SERPL-MCNC: 123 MG/DL (ref 70–110)
GLUCOSE SERPL-MCNC: 146 MG/DL (ref 70–110)
GLUCOSE SERPL-MCNC: 161 MG/DL (ref 70–110)
HCT VFR BLD AUTO: 37.2 % (ref 40–54)
HGB BLD-MCNC: 11.9 G/DL (ref 14–18)
IMM GRANULOCYTES # BLD AUTO: 0.02 K/UL (ref 0–0.04)
IMM GRANULOCYTES NFR BLD AUTO: 0.3 % (ref 0–0.5)
LYMPHOCYTES # BLD AUTO: 2.3 K/UL (ref 1–4.8)
LYMPHOCYTES NFR BLD: 29.5 % (ref 18–48)
MAGNESIUM SERPL-MCNC: 1.4 MG/DL (ref 1.6–2.6)
MCH RBC QN AUTO: 29.2 PG (ref 27–31)
MCHC RBC AUTO-ENTMCNC: 32 G/DL (ref 32–36)
MCV RBC AUTO: 91 FL (ref 82–98)
MONOCYTES # BLD AUTO: 0.7 K/UL (ref 0.3–1)
MONOCYTES NFR BLD: 9.4 % (ref 4–15)
NEUTROPHILS # BLD AUTO: 4.2 K/UL (ref 1.8–7.7)
NEUTROPHILS NFR BLD: 53.4 % (ref 38–73)
NRBC BLD-RTO: 0 /100 WBC
PHOSPHATE SERPL-MCNC: 3.8 MG/DL (ref 2.7–4.5)
PLATELET # BLD AUTO: 233 K/UL (ref 150–450)
PMV BLD AUTO: 10.2 FL (ref 9.2–12.9)
POCT GLUCOSE: 128 MG/DL (ref 70–110)
POCT GLUCOSE: 132 MG/DL (ref 70–110)
POCT GLUCOSE: 132 MG/DL (ref 70–110)
POCT GLUCOSE: 133 MG/DL (ref 70–110)
POCT GLUCOSE: 139 MG/DL (ref 70–110)
POCT GLUCOSE: 154 MG/DL (ref 70–110)
POCT GLUCOSE: 155 MG/DL (ref 70–110)
POCT GLUCOSE: 160 MG/DL (ref 70–110)
POCT GLUCOSE: 162 MG/DL (ref 70–110)
POCT GLUCOSE: 169 MG/DL (ref 70–110)
POCT GLUCOSE: 176 MG/DL (ref 70–110)
POCT GLUCOSE: 178 MG/DL (ref 70–110)
POCT GLUCOSE: 180 MG/DL (ref 70–110)
POCT GLUCOSE: 240 MG/DL (ref 70–110)
POCT GLUCOSE: 286 MG/DL (ref 70–110)
POCT GLUCOSE: 293 MG/DL (ref 70–110)
POCT GLUCOSE: 434 MG/DL (ref 70–110)
POTASSIUM SERPL-SCNC: 3.9 MMOL/L (ref 3.5–5.1)
POTASSIUM SERPL-SCNC: 4.1 MMOL/L (ref 3.5–5.1)
POTASSIUM SERPL-SCNC: 4.9 MMOL/L (ref 3.5–5.1)
RBC # BLD AUTO: 4.08 M/UL (ref 4.6–6.2)
SODIUM SERPL-SCNC: 140 MMOL/L (ref 136–145)
SODIUM SERPL-SCNC: 141 MMOL/L (ref 136–145)
SODIUM SERPL-SCNC: 143 MMOL/L (ref 136–145)
WBC # BLD AUTO: 7.87 K/UL (ref 3.9–12.7)

## 2022-04-06 PROCEDURE — 97161 PT EVAL LOW COMPLEX 20 MIN: CPT

## 2022-04-06 PROCEDURE — 25000003 PHARM REV CODE 250: Performed by: INTERNAL MEDICINE

## 2022-04-06 PROCEDURE — 25000003 PHARM REV CODE 250

## 2022-04-06 PROCEDURE — C9399 UNCLASSIFIED DRUGS OR BIOLOG: HCPCS

## 2022-04-06 PROCEDURE — 80048 BASIC METABOLIC PNL TOTAL CA: CPT

## 2022-04-06 PROCEDURE — 80048 BASIC METABOLIC PNL TOTAL CA: CPT | Mod: 91

## 2022-04-06 PROCEDURE — 99233 PR SUBSEQUENT HOSPITAL CARE,LEVL III: ICD-10-PCS | Mod: GC,,, | Performed by: INTERNAL MEDICINE

## 2022-04-06 PROCEDURE — 63600175 PHARM REV CODE 636 W HCPCS

## 2022-04-06 PROCEDURE — 83735 ASSAY OF MAGNESIUM: CPT

## 2022-04-06 PROCEDURE — 85025 COMPLETE CBC W/AUTO DIFF WBC: CPT

## 2022-04-06 PROCEDURE — 99233 SBSQ HOSP IP/OBS HIGH 50: CPT | Mod: GC,,, | Performed by: INTERNAL MEDICINE

## 2022-04-06 PROCEDURE — 36415 COLL VENOUS BLD VENIPUNCTURE: CPT

## 2022-04-06 PROCEDURE — 97535 SELF CARE MNGMENT TRAINING: CPT

## 2022-04-06 PROCEDURE — 97165 OT EVAL LOW COMPLEX 30 MIN: CPT

## 2022-04-06 PROCEDURE — 20600001 HC STEP DOWN PRIVATE ROOM

## 2022-04-06 PROCEDURE — 84100 ASSAY OF PHOSPHORUS: CPT

## 2022-04-06 RX ORDER — INSULIN ASPART 100 [IU]/ML
5 INJECTION, SOLUTION INTRAVENOUS; SUBCUTANEOUS
Status: DISCONTINUED | OUTPATIENT
Start: 2022-04-06 | End: 2022-04-06

## 2022-04-06 RX ORDER — MAGNESIUM SULFATE HEPTAHYDRATE 40 MG/ML
2 INJECTION, SOLUTION INTRAVENOUS ONCE
Status: COMPLETED | OUTPATIENT
Start: 2022-04-06 | End: 2022-04-06

## 2022-04-06 RX ORDER — INSULIN ASPART 100 [IU]/ML
7 INJECTION, SOLUTION INTRAVENOUS; SUBCUTANEOUS
Status: DISCONTINUED | OUTPATIENT
Start: 2022-04-07 | End: 2022-04-07

## 2022-04-06 RX ORDER — INSULIN ASPART 100 [IU]/ML
3 INJECTION, SOLUTION INTRAVENOUS; SUBCUTANEOUS
Status: DISCONTINUED | OUTPATIENT
Start: 2022-04-06 | End: 2022-04-06

## 2022-04-06 RX ADMIN — ATORVASTATIN CALCIUM 40 MG: 20 TABLET, FILM COATED ORAL at 09:04

## 2022-04-06 RX ADMIN — APIXABAN 5 MG: 5 TABLET, FILM COATED ORAL at 08:04

## 2022-04-06 RX ADMIN — CETIRIZINE HYDROCHLORIDE 10 MG: 10 TABLET, FILM COATED ORAL at 08:04

## 2022-04-06 RX ADMIN — LOSARTAN POTASSIUM 25 MG: 25 TABLET, FILM COATED ORAL at 09:04

## 2022-04-06 RX ADMIN — MAGNESIUM SULFATE HEPTAHYDRATE 2 G: 2 INJECTION, SOLUTION INTRAVENOUS at 10:04

## 2022-04-06 RX ADMIN — INSULIN ASPART 3 UNITS: 100 INJECTION, SOLUTION INTRAVENOUS; SUBCUTANEOUS at 12:04

## 2022-04-06 RX ADMIN — INSULIN ASPART 3 UNITS: 100 INJECTION, SOLUTION INTRAVENOUS; SUBCUTANEOUS at 09:04

## 2022-04-06 RX ADMIN — GABAPENTIN 200 MG: 100 CAPSULE ORAL at 04:04

## 2022-04-06 RX ADMIN — CLOPIDOGREL 75 MG: 75 TABLET, FILM COATED ORAL at 09:04

## 2022-04-06 RX ADMIN — AMIODARONE HYDROCHLORIDE 200 MG: 200 TABLET ORAL at 09:04

## 2022-04-06 RX ADMIN — INSULIN ASPART 5 UNITS: 100 INJECTION, SOLUTION INTRAVENOUS; SUBCUTANEOUS at 05:04

## 2022-04-06 RX ADMIN — INSULIN DETEMIR 8 UNITS: 100 INJECTION, SOLUTION SUBCUTANEOUS at 12:04

## 2022-04-06 RX ADMIN — INSULIN DETEMIR 4 UNITS: 100 INJECTION, SOLUTION SUBCUTANEOUS at 09:04

## 2022-04-06 RX ADMIN — GABAPENTIN 200 MG: 100 CAPSULE ORAL at 10:04

## 2022-04-06 RX ADMIN — LACOSAMIDE 100 MG: 100 TABLET, FILM COATED ORAL at 09:04

## 2022-04-06 RX ADMIN — MUPIROCIN: 20 OINTMENT TOPICAL at 08:04

## 2022-04-06 RX ADMIN — APIXABAN 5 MG: 5 TABLET, FILM COATED ORAL at 09:04

## 2022-04-06 RX ADMIN — METOPROLOL SUCCINATE 50 MG: 50 TABLET, EXTENDED RELEASE ORAL at 09:04

## 2022-04-06 RX ADMIN — PANTOPRAZOLE SODIUM 40 MG: 40 TABLET, DELAYED RELEASE ORAL at 09:04

## 2022-04-06 RX ADMIN — MUPIROCIN: 20 OINTMENT TOPICAL at 12:04

## 2022-04-06 RX ADMIN — GABAPENTIN 200 MG: 100 CAPSULE ORAL at 09:04

## 2022-04-06 RX ADMIN — LACOSAMIDE 100 MG: 100 TABLET, FILM COATED ORAL at 08:04

## 2022-04-06 RX ADMIN — MUPIROCIN: 20 OINTMENT TOPICAL at 09:04

## 2022-04-06 NOTE — SUBJECTIVE & OBJECTIVE
Interval History: REJI, got detemir 8 overnight, started on basal and bolus and diabetic diet. POCT glucose ACHS and 2am. Denies more diarrhea episodes. Flex Olivia'jeffrey. Typically eats small breakfast, bigger lunch/dinner.     Review of Systems   Constitutional:  Negative for chills and fever.   HENT: Negative.     Eyes: Negative.    Respiratory:  Negative for cough and shortness of breath.    Cardiovascular:  Negative for chest pain, palpitations and leg swelling.   Gastrointestinal:  Negative for abdominal pain, constipation, diarrhea, nausea and vomiting.   Endocrine: Positive for polyuria.   Genitourinary:  Negative for dysuria and hematuria.   Musculoskeletal:  Positive for back pain (sacral). Negative for arthralgias.   Skin:  Positive for wound.   Neurological:  Positive for weakness. Negative for dizziness and headaches.   Hematological: Negative.    Psychiatric/Behavioral: Negative.     Objective:     Vital Signs (Most Recent):  Temp: 97.9 °F (36.6 °C) (04/06/22 0850)  Pulse: 63 (04/06/22 0850)  Resp: 14 (04/06/22 0850)  BP: (!) 150/71 (04/06/22 0850)  SpO2: 98 % (04/06/22 0850)   Vital Signs (24h Range):  Temp:  [97.4 °F (36.3 °C)-98 °F (36.7 °C)] 97.9 °F (36.6 °C)  Pulse:  [57-72] 63  Resp:  [13-98] 14  SpO2:  [93 %-99 %] 98 %  BP: (116-150)/(58-71) 150/71     Weight: 75 kg (165 lb 5.5 oz)  Body mass index is 21.23 kg/m².    Intake/Output Summary (Last 24 hours) at 4/6/2022 1348  Last data filed at 4/6/2022 0658  Gross per 24 hour   Intake 914.02 ml   Output 990 ml   Net -75.98 ml      Physical Exam  Constitutional:       Appearance: Normal appearance.   HENT:      Head: Normocephalic and atraumatic.      Nose: Nose normal.      Mouth/Throat:      Mouth: Mucous membranes are dry.   Eyes:      General: No scleral icterus.  Cardiovascular:      Rate and Rhythm: Normal rate and regular rhythm.      Pulses: Normal pulses.      Heart sounds: Normal heart sounds. No murmur heard.  Pulmonary:      Effort: Pulmonary  effort is normal. No respiratory distress.      Breath sounds: Normal breath sounds. No wheezing or rales.   Abdominal:      General: Bowel sounds are normal. There is no distension.      Palpations: Abdomen is soft.   Musculoskeletal:         General: Normal range of motion.      Cervical back: Normal range of motion. No rigidity.      Right lower leg: No edema.      Left lower leg: No edema.   Skin:     General: Skin is dry.          Neurological:      General: No focal deficit present.      Mental Status: He is alert and oriented to person, place, and time.      Motor: No weakness.   Psychiatric:         Mood and Affect: Mood normal.         Behavior: Behavior normal.       Significant Labs: All pertinent labs within the past 24 hours have been reviewed.  BMP:   Recent Labs   Lab 04/06/22  0324 04/06/22  0804   * 123*    141   K 4.1 4.9    105   CO2 26 27   BUN 25* 23   CREATININE 1.0 1.1   CALCIUM 8.6* 8.8   MG 1.4*  --      CBC:   Recent Labs   Lab 04/05/22  1156 04/06/22  0324   WBC 5.92 7.87   HGB 10.4* 11.9*   HCT 33.3* 37.2*    233     POCT Glucose:   Recent Labs   Lab 04/06/22  0913 04/06/22  1025 04/06/22  1248   POCTGLUCOSE 128* 178* 240*       Significant Imaging: I have reviewed all pertinent imaging results/findings within the past 24 hours.

## 2022-04-06 NOTE — PT/OT/SLP EVAL
Occupational Therapy   Co-Evaluation and Treatment    Name: Kamar Muñoz  MRN: [id number]  Admitting Diagnosis:  Diabetic ketoacidosis without coma associated with type 2 diabetes mellitus  Recent Surgery: * No surgery found *      Recommendations:     Discharge Recommendations: home health OT  Discharge Equipment Recommendations:  none  Barriers to discharge:  None    Assessment:     Kamar Muñoz is a 76 y.o. male with a medical diagnosis of Diabetic ketoacidosis without coma associated with type 2 diabetes mellitus. He presents with the following performance deficits affecting function: impaired self care skills, impaired functional mobilty, gait instability, impaired endurance, impaired balance. Patient agreeable to therapy evaluation and tolerated session well. Patient required assist with LB dressing but otherwise was able to complete session without physical assist. At this time, patient will continue to benefit from acute skilled therapy intervention to address deficits/underlying impairments and progress towards prior level of function. After discharge, patient will benefit from receiving home health therapy services to ensure safety and independence in the home environment.     Rehab Prognosis: Good; patient would benefit from acute skilled OT services to address these deficits and reach maximum level of function.       Plan:     Patient to be seen 3 x/week to address the above listed problems via self-care/home management, therapeutic activities, therapeutic exercises  · Plan of Care Expires: 05/06/22  · Plan of Care Reviewed with: patient    Subjective     Chief Complaint: pain when sitting chair; patient reported pressure sore and therapist ordered cushion for chair  Patient/Family Comments/goals: to go home    Occupational Profile:  Living Environment: Patient lives with his wife in a [facility] with 1 [address]. Patient has a walk-in shower with grab bar and shower chair.  Previous level of function:  Patient reports independence with ADLs and ambulates with RW.   Roles and Routines: Drives  Equipment Used at Home:  walker, rolling, shower chair, grab bar  Assistance upon Discharge: Neighbor assists Mon-Fri for a few hours in the morning and a few hours in the afternoon.    Pain/Comfort:  · Pain Rating 1: 7/10  · Location 1:  (reported pressure sore on coccyx)  · Pain Addressed 1: Reposition (ordered cushion for chair)    Patients cultural, spiritual, Christian conflicts given the current situation: no    Objective:     Communicated with: RN prior to session.  Patient found up in chair with telemetry, pulse ox (continuous) upon OT entry to room.    Additional staff present: PT present for co-evla/tx as appropriate for patient 2* medical complexities, decreased activity tolerance for 2 sessions, and level of skilled assist needed for task completion.    General Precautions: Standard, fall   Orthopedic Precautions:N/A   Braces: N/A  Respiratory Status: Room air    Occupational Performance:    Bed Mobility:    · Not assessed; patient up in chair at start of session and returned to chair at end of session    Functional Mobility/Transfers:  · Patient completed Sit <> Stand Transfer with stand by assistance  with  rolling walker   · Functional Mobility: Patient ambulated within room and hallway with SBA with RW; refer to PT eval for distance    Activities of Daily Living:  · Lower Body Dressing: moderate assistance to don socks   · Upper Body Dressing: CGA for balance standing to don masks    Cognitive/Visual Perceptual:  Cognitive/Psychosocial Skills:    -       Oriented to: Person, Place, Time and Situation   -       Follows Commands/attention: Follows multistep commands  -       Communication: clear/fluent  -       Memory: No Deficits noted  -       Safety awareness/insight to disability: intact   -       Mood/Affect/Coping skills/emotional control: Appropriate to situation, Cooperative and Pleasant    Physical  Exam:  Dominant hand:    -       Right  Upper Extremity Range of Motion:    -       Right Upper Extremity: WNL  -       Left Upper Extremity: WNL  Upper Extremity Strength:    -       Right Upper Extremity: WNL  -       Left Upper Extremity: WNL    AMPAC 6 Click ADL:  AMPAC Total Score: 20    Treatment & Education:   Therapist provided facilitation and instruction of proper body mechanics and fall prevention strategies during tasks listed above.   Instructed patient to sit in bedside chair daily to increase OOB/activity tolerance.   Instructed patient to use call light to have nursing staff assist with needs/transfers.   Discussed OT POC and answered all questions within OT scope of practice.   Whiteboard updated   Education:    Patient left up in chair with all lines intact and call button in reach    GOALS:   Multidisciplinary Problems     Occupational Therapy Goals        Problem: Occupational Therapy    Goal Priority Disciplines Outcome Interventions   Occupational Therapy Goal     OT, PT/OT Ongoing, Progressing    Description: Goals to be met by: 4/20/2022     Patient will increase functional independence with ADLs by performing:    LE Dressing with Supervision.  Grooming while standing with Supervision.  Toileting from toilet with Supervision for hygiene and clothing management.   Toilet transfer to toilet with Supervision.                     History:     Past Medical History:   Diagnosis Date    Arthritis     Coronary artery disease     Diabetes mellitus type II     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    Hyperlipidemia     Hypertension     Kidney stone     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 8/2/2012    STEMI involving right coronary artery 1/9/2022    Type II or unspecified type diabetes mellitus with neurological manifestations, uncontrolled(250.62) 3/8/2013    Urinary tract infection        Past Surgical History:   Procedure Laterality Date    BACK SURGERY       CATARACT EXTRACTION W/  INTRAOCULAR LENS IMPLANT Right     Per Dr Romero note 11/2018    COLONOSCOPY N/A 1/28/2019    Procedure: COLONOSCOPY Suprep;  Surgeon: Anh Johnson MD;  Location: [facility] ENDO;  Service: Endoscopy;  Laterality: N/A;    EYE SURGERY      HERNIA REPAIR      LEFT HEART CATHETERIZATION Left 1/9/2022    Procedure: CATHETERIZATION, HEART, LEFT;  Surgeon: Will Hurst III, MD;  Location: [facility] CATH LAB/EP;  Service: Cardiology;  Laterality: Left;    renal stones      SHOULDER OPEN ROTATOR CUFF REPAIR         Time Tracking:     OT Date of Treatment: 04/06/22  OT Start Time: 1250  OT Stop Time: 1308  OT Total Time (min): 18 min    Billable Minutes:Evaluation 10  Self Care/Home Management 8    4/6/2022

## 2022-04-06 NOTE — PLAN OF CARE
Recommendations     1. Continue current Diabetic diet, add Boost Glucose Control ONS to aid in caloric intake.   2. RD to monitor & follow-up.     Goals: Meet % EEN, EPN by RD f/u date  Nutrition Goal Status: new  Communication of RD Recs: reviewed with RN

## 2022-04-06 NOTE — ASSESSMENT & PLAN NOTE
IDDM  Currently poorly controlled  Meds: Metformin 1000 BID, insulin aspart 6 TID, insulin glargine 12 daily    Lab Results   Component Value Date    HGBA1C 9.7 (H) 04/05/2022     - detemir 8, aspart 3 TID  - eats small breakfast, and bigger lunch/dinner. May need different doses for premeal insulin by meal.   - consider SGLT-2 inhibitor at discharge  - endocrinology referral at discharge

## 2022-04-06 NOTE — PT/OT/SLP EVAL
Physical Therapy Evaluation and Discharge Note    Patient Name:  Kamar Muñoz   MRN:  [id number]    Recommendations:     Discharge Recommendations:  home health PT   Discharge Equipment Recommendations: none   Barriers to discharge: None    Assessment:     Kamar Muñoz is a 76 y.o. male admitted with a medical diagnosis of Diabetic ketoacidosis without coma associated with type 2 diabetes mellitus. .  At this time, patient is functioning at their prior level of function and does not require further acute PT services.     Recent Surgery: * No surgery found *      Plan:     During this hospitalization, patient does not require further acute PT services.  Please re-consult if situation changes.      Subjective     Chief Complaint: sore coccyx  Patient/Family Comments/goals: pt. Agreeable to PT  Pain/Comfort:  · Pain Rating 1: 7/10  · Location 1: coccyx  · Pain Addressed 1: Reposition  · Pain Rating Post-Intervention 1: 7/10    Patients cultural, spiritual, [employer] conflicts given the current situation: no    Living Environment:  Pt. Lives with spouse in [facility] with no [place]IN[place]. Pt. has caregiver that comes to home daily during the week  Prior to admission, patients level of function was amb. with RW.  Equipment used at home: walker, rolling, shower chair, wheelchair, bedside commode.  Upon discharge, patient will have assistance from caregiver.    Objective:     Communicated with nursing prior to session.  Patient found supine with peripheral IV upon PT entry to room.    General Precautions: Standard, fall   Orthopedic Precautions:N/A   Braces: N/A   Respiratory Status: Room air    Exams:  · RLE ROM: WFL  · RLE Strength: WFL  · LLE ROM: WFL  · LLE Strength: WFL    Functional Mobility:  · Transfers:     · Sit to Stand:  stand by assistance with rolling walker  · Gait: 250' with RW and SBA with slow, steady gait without LOB  · Balance: fair+    AM-PAC 6 CLICK MOBILITY  Total Score:20       Therapeutic Activities and  Exercises:   Discussed therapy needs, goals, and POC.    AM-PAC 6 CLICK MOBILITY  Total Score:20     Patient left up in chair with all lines intact and call button in reach.    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    Hyperlipidemia     Hypertension     Kidney stone     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 8/2/2012    STEMI involving right coronary artery 1/9/2022    Type II or unspecified type diabetes mellitus with neurological manifestations, uncontrolled(250.62) 3/8/2013    Urinary tract infection        Past Surgical History:   Procedure Laterality Date    BACK SURGERY      CATARACT EXTRACTION W/  INTRAOCULAR LENS IMPLANT Right     Per Dr Romero note 11/2018    COLONOSCOPY N/A 1/28/2019    Procedure: COLONOSCOPY Suprep;  Surgeon: Anh Johnson MD;  Location: [facility] ENDO;  Service: Endoscopy;  Laterality: N/A;    EYE SURGERY      HERNIA REPAIR      LEFT HEART CATHETERIZATION Left 1/9/2022    Procedure: CATHETERIZATION, HEART, LEFT;  Surgeon: Will Hurst III, MD;  Location: [facility] CATH LAB/EP;  Service: Cardiology;  Laterality: Left;    renal stones      SHOULDER OPEN ROTATOR CUFF REPAIR         Time Tracking:     PT Received On: 04/06/22  PT Start Time: 1250     PT Stop Time: 1307  PT Total Time (min): 17 min     Billable Minutes: Evaluation 17      04/06/2022

## 2022-04-06 NOTE — HOSPITAL COURSE
Admitted to hospital medicine for DKA with glucose in the 600s and anion gap of 24. No infectious symptoms identified and cardiac eval with ekg/trop were negative. He was started on an insulin gtt and transitioned off the drip within 12 hours of admission. Hospital course c/b labile sugars off the insulin gtt with fasting sugars showing hypoglycemia and then prandial sugars into the 500s requiring additional units of aspart. Upon chart review, appears patient has issues with fasting sugars with frequent hypoglycemic episodes. Endocrinology consulted for help with management of labile blood sugars. Per endo recs, changed to detemir 6, aspart 8 TID. They recommend CGM outpatient. POCT glucose ACHS and 2am. BG improved on 4/10. Will DC with HH. Insulin regimen 8 TIDWM, 8 long acting, and low dose insulin sliding scale.

## 2022-04-06 NOTE — ASSESSMENT & PLAN NOTE
DKA likely 2/2 to inadequate insulin regimen/diet noncompliance/difficulty managing meds at home given that his wife is also ill. Poorly controlled at SNF prior to DC, and likely continuation of that. No evidence of UTI or pneumonia at this time. Does not meet SIRdS/sepsis criteria. Possible source may be sacral injury, but does not appear infected at this time. Insulin drip and IVF on 4/5-4/6.     -- poct glucose ACHS and 2am  -- HbA1C 9.7  -- diabetic diet and boost glucose control  -- AG closed, bicarb wnl as of 4/6

## 2022-04-06 NOTE — PLAN OF CARE
04/06/22 1530   Post-Acute Status   Post-Acute Authorization Home Health   Home Health Status Referrals Sent   Coverage [employer]   Discharge Delays None known at this time   Discharge Plan   Discharge Plan A Home with family;Home Health   Discharge Plan B Home Health;Home with family     Patient had services with [facility] for  services. Forwarded them referral in [facility] to resume services.    Leslie Aguilera, [facility] [place]  [phone]

## 2022-04-06 NOTE — ASSESSMENT & PLAN NOTE
Nutrition Problem:  Severe Protein-Calorie Malnutrition  Malnutrition in the context of Chronic Illness/Injury    Related to (etiology):  Inability to consume sufficient energy     Signs and Symptoms (as evidenced by):  Energy Intake: <75% of estimated energy requirement for 1-2 months  Body Fat Depletion: moderate and severe depletion of orbitals and triceps   Muscle Mass Depletion: moderate and severe depletion of temples, clavicle region and lower extremities   Weight Loss: 18% x 3-4 months    Interventions(treatment strategy):  Collaboration of nutrition care w/ other providers  [facility]    Nutrition Diagnosis Status:  New

## 2022-04-06 NOTE — PLAN OF CARE
JASIEL attempted to contact patient's daughter Basia ([phone]).. Mailbox is full.    Left VM with Marisasoledad Copelanddo (daughter) [phone].     Leslie Aguilera, [facility]  [phone]

## 2022-04-06 NOTE — MEDICAL/APP STUDENT
Progress Note  Hospital Medicine                                                              Team: [facility] HOSP MED 1    Patient Name: Kamar Muñoz  YOB: 1945    Admit Date: 4/5/2022                     LOS: 1    SUBJECTIVE:     Reason for Admission:  Diabetic ketoacidosis without coma associated with type 2 diabetes mellitus  See H&P for detailed initial presentation history and ROS.      Interval history: No acute events overnight. Pt states he is feeling better today with less fatigue and his sacral ulcer has not been an painful as usual.       OBJECTIVE:     Vital Signs Range (Last 24H):  Temp:  [97.4 °F (36.3 °C)-98 °F (36.7 °C)]   Pulse:  [57-72]   Resp:  [13-98]   BP: (116-150)/(58-71)   SpO2:  [93 %-99 %] Body mass index is 21.23 kg/m².  Wt Readings from Last 3 Encounters:   04/06/22 0426 75 kg (165 lb 5.5 oz)   04/05/22 1101 75 kg (165 lb 5.6 oz)   03/27/22 0515 75 kg (165 lb 5.5 oz)   03/20/22 0332 75.2 kg (165 lb 12.6 oz)   03/16/22 0500 77.4 kg (170 lb 10.2 oz)   03/11/22 1454 77 kg (169 lb 12.1 oz)   03/10/22 1633 77 kg (169 lb 12.1 oz)   02/21/22 2300 76.4 kg (168 lb 6.9 oz)   02/21/22 1100 76.4 kg (168 lb 6.9 oz)   02/20/22 0004 76.4 kg (168 lb 6.9 oz)   02/19/22 1852 94.8 kg (209 lb)       I & O (Last 24H):    Intake/Output Summary (Last 24 hours) at 4/6/2022 1128  Last data filed at 4/6/2022 0658  Gross per 24 hour   Intake 914.02 ml   Output 990 ml   Net -75.98 ml       Physical Exam:  Physical Exam  Constitutional:       Appearance: Normal appearance. He is normal weight.   Eyes:      Pupils: Pupils are equal, round, and reactive to light.   Cardiovascular:      Rate and Rhythm: Normal rate and regular rhythm.      Pulses: Normal pulses.      Heart sounds: Normal heart sounds.   Pulmonary:      Effort: Pulmonary effort is normal.      Breath sounds: Normal breath sounds.   Abdominal:      General: Abdomen is flat. Bowel sounds are normal.      Palpations: Abdomen is soft.    Genitourinary:     Comments: Has lopez catheter.  Musculoskeletal:         General: Normal range of motion.   Skin:     General: Skin is warm and dry.      Capillary Refill: Capillary refill takes less than 2 seconds.      Findings: Bruising present.      Comments: Sacral ulcer   Neurological:      Mental Status: He is alert and oriented to person, place, and time. Mental status is at baseline.      Comments: Has weakness from prior CVA   Psychiatric:         Mood and Affect: Mood normal.         Behavior: Behavior normal.         Diagnostic Results:  Lab Results   Component Value Date    WBC 7.87 04/06/2022    HGB 11.9 (L) 04/06/2022    HCT 37.2 (L) 04/06/2022    MCV 91 04/06/2022     04/06/2022     Recent Labs   Lab 04/06/22  0324 04/06/22  0804   * 123*    141   K 4.1 4.9    105   CO2 26 27   BUN 25* 23   CREATININE 1.0 1.1   CALCIUM 8.6* 8.8   MG 1.4*  --      Lab Results   Component Value Date    INR 1.2 02/19/2022    INR 1.1 02/17/2022    INR 1.0 01/25/2022     Lab Results   Component Value Date    HGBA1C 9.7 (H) 04/05/2022     Recent Labs     04/06/22  0302 04/06/22  0408 04/06/22  0503 04/06/22  0558 04/06/22  0656 04/06/22  0816 04/06/22  0913 04/06/22  1025   POCTGLUCOSE 160* 133* 154* 139* 132* 132* 128* 178*       Imaging: None  Micro:None  ASSESSMENT/PLAN:     Current Problems List:  Active Hospital Problems    Diagnosis  POA    *Diabetic ketoacidosis without coma associated with type 2 diabetes mellitus [E11.10]  Yes    Chronic anticoagulation [Z79.01]  Not Applicable    Focal seizures [R56.9]  Yes     Chronic    History of ST elevation myocardial infarction (STEMI) [I25.2]  Not Applicable     Chronic    Stented coronary artery [Z95.5]  Not Applicable    Paroxysmal atrial fibrillation [I48.0]  Yes     Chronic    History of embolic stroke [Z86.73]  Not Applicable    Coronary artery disease involving native coronary artery of native heart with unstable angina pectoris  [I25.110]  Yes     Chronic    Centrilobular emphysema [J43.2]  Yes     Hyperinflated cxr.  Emphysematous changes on lung cuts on ct abd.  Clinically asymptomatic aside from cough.  Baseline pft.  Prn albuterol.        Type 2 diabetes mellitus with hyperglycemia, with long-term current use of insulin [E11.65, Z79.4]  Not Applicable     Chronic    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 [E13.40]  Yes     Chronic    Hypertension associated with diabetes [E11.59, I15.2]  Yes     Chronic      Resolved Hospital Problems   No resolved problems to display.       Active Problems, Status & Treatment Plan Addressed Today:  DKA and Type II DM  -DKA due to poorly controlled type II DM   -BG has improved to the 130s  -insulin drip was discontinued and basal/bolus insulin restarted  -titrate bolus insulin to meal size  -arrange follow up with endocrine clinic at discharge    Hypertension  -normotensive during admission  -continue home metoprolol and losartan    Mccord catheter  -remove    Afib  -controlled with amiodarone  -continue anticoagulation    Hx of R MCA embolic stroke  -continue anticoagulation  -PT/OT for residual weakness    Neuropathy  -continue gabapentin    Sacral ulcer  -PT/OT to get pt up  -rotate pt    Signing Student:  Deloris Guido MS3

## 2022-04-06 NOTE — PROGRESS NOTES
[facility] ([address] Medicine  Progress Note    Patient Name: Kamar Muñoz  MRN: [id number]  Patient Class: IP- Inpatient   Admission Date: 4/5/2022  Length of Stay: 1 days  Attending Physician: Orville Booth MD  Primary Care Provider: Basim Guerrero MD        Subjective:     Principal Problem:Diabetic ketoacidosis without coma associated with type 2 diabetes mellitus        HPI:  Mr. 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 He reports that his home BG runs in 300 and 500s. EMS found his with BG unreadable. Patient states that he took his insulin last night. He took 9 units novolog last night. As of this morning, he has not eaten and did not take his morning insulin. He reports nausea and weakness. Denies chest pain, SOB, palpitations, fever, chills, abdominal pain, constipation. Reports some diarrhea that started in the SNF.     He reports eating toast, cereal, PB&J sandwiches, uses sweet and low for coffee. Denies consuming excessively starchy or sugary foods.     The patient was recently discharged from SNF after being transferred there following his hospitalization for Covid with respiratory failure, DKA, metabolic encephalopathy, requiring ICU admission. He was admitted for weakness/debility and continued insulin adjustments. Admission to SNF was for secondary weakness debility, continued insulin adjustments. BG had been labile during his SNF stay and difficult to control with hypoglycemic events as well. Patient was discharged with insulin aspart 6 TID and glargine 12 units daily with sliding scale.     In the ED, significant labs include BG of 626, bicarb 14, BNB 5.9, UA with 3+ ketones, 3+ glucose, occult blood. VBG showed mild acidosis with pH of 7.3. He  received insulin regular bolus, zofran, NS 1 L x2, and was started on insulin drip.                  Overview/Hospital Course:  Mr. 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 He reports that his home BG runs in 300 and 500s. BG improved overnight on insulin drip and IVF. Morning BG were in 130s. Started basal and bolus insulin along with diabetic diet and boost glucose control.       Interval History: NAOE, got detemir 8 overnight, started on basal and bolus and diabetic diet. POCT glucose ACHS and 2am. Denies more diarrhea episodes. Mccord Dc'd. Typically eats small breakfast, bigger lunch/dinner.     Review of Systems   Constitutional:  Negative for chills and fever.   HENT: Negative.     Eyes: Negative.    Respiratory:  Negative for cough and shortness of breath.    Cardiovascular:  Negative for chest pain, palpitations and leg swelling.   Gastrointestinal:  Negative for abdominal pain, constipation, diarrhea, nausea and vomiting.   Endocrine: Positive for polyuria.   Genitourinary:  Negative for dysuria and hematuria.   Musculoskeletal:  Positive for back pain (sacral). Negative for arthralgias.   Skin:  Positive for wound.   Neurological:  Positive for weakness. Negative for dizziness and headaches.   Hematological: Negative.    Psychiatric/Behavioral: Negative.     Objective:     Vital Signs (Most Recent):  Temp: 97.9 °F (36.6 °C) (04/06/22 0850)  Pulse: 63 (04/06/22 0850)  Resp: 14 (04/06/22 0850)  BP: (!) 150/71 (04/06/22 0850)  SpO2: 98 % (04/06/22 0850)   Vital Signs (24h Range):  Temp:  [97.4 °F (36.3 °C)-98 °F (36.7 °C)] 97.9 °F (36.6 °C)  Pulse:  [57-72] 63  Resp:  [13-98] 14  SpO2:  [93 %-99 %] 98 %  BP: (116-150)/(58-71) 150/71     Weight: 75 kg (165 lb 5.5 oz)  Body mass index is 21.23  kg/m².    Intake/Output Summary (Last 24 hours) at 4/6/2022 1348  Last data filed at 4/6/2022 0658  Gross per 24 hour   Intake 914.02 ml   Output 990 ml   Net -75.98 ml      Physical Exam  Constitutional:       Appearance: Normal appearance.   HENT:      Head: Normocephalic and atraumatic.      Nose: Nose normal.      Mouth/Throat:      Mouth: Mucous membranes are dry.   Eyes:      General: No scleral icterus.  Cardiovascular:      Rate and Rhythm: Normal rate and regular rhythm.      Pulses: Normal pulses.      Heart sounds: Normal heart sounds. No murmur heard.  Pulmonary:      Effort: Pulmonary effort is normal. No respiratory distress.      Breath sounds: Normal breath sounds. No wheezing or rales.   Abdominal:      General: Bowel sounds are normal. There is no distension.      Palpations: Abdomen is soft.   Musculoskeletal:         General: Normal range of motion.      Cervical back: Normal range of motion. No rigidity.      Right lower leg: No edema.      Left lower leg: No edema.   Skin:     General: Skin is dry.          Neurological:      General: No focal deficit present.      Mental Status: He is alert and oriented to person, place, and time.      Motor: No weakness.   Psychiatric:         Mood and Affect: Mood normal.         Behavior: Behavior normal.       Significant Labs: All pertinent labs within the past 24 hours have been reviewed.  BMP:   Recent Labs   Lab 04/06/22  0324 04/06/22  0804   * 123*    141   K 4.1 4.9    105   CO2 26 27   BUN 25* 23   CREATININE 1.0 1.1   CALCIUM 8.6* 8.8   MG 1.4*  --      CBC:   Recent Labs   Lab 04/05/22  1156 04/06/22  0324   WBC 5.92 7.87   HGB 10.4* 11.9*   HCT 33.3* 37.2*    233     POCT Glucose:   Recent Labs   Lab 04/06/22  0913 04/06/22  1025 04/06/22  1248   POCTGLUCOSE 128* 178* 240*       Significant Imaging: I have reviewed all pertinent imaging results/findings within the past 24 hours.      Assessment/Plan:      * Diabetic  ketoacidosis without coma associated with type 2 diabetes mellitus  DKA likely 2/2 to inadequate insulin regimen/diet noncompliance/difficulty managing meds at home given that his wife is also ill. Poorly controlled at SNF prior to DC, and likely continuation of that. No evidence of UTI or pneumonia at this time. Does not meet SIRdS/sepsis criteria. Possible source may be sacral injury, but does not appear infected at this time. Insulin drip and IVF on 4/5-4/6.     -- poct glucose ACHS and 2am  -- HbA1C 9.7  -- diabetic diet and boost glucose control  -- AG closed, bicarb wnl as of 4/6        Severe malnutrition    Boost glucose control TID    Chronic anticoagulation  Chronically on Eliquis      Focal seizures  Continue home lacosamide      History of ST elevation myocardial infarction (STEMI)  Hx of RCA STEMI in Jan s/p LAUREN placement on 1/9/22. Was on ASA/Plavix at home    - Will continue plavix only given that he is on apixaban since it's been greater than 1 month since LAUREN placement  - No need for triple therapy (ASA/Plavix/eliquis) beyond 1 month      Stented coronary artery  CAD s/p STEMI and RCA LAUREN placed 1/9/22      Paroxysmal atrial fibrillation  Patient with Paroxysmal (<7 days) atrial fibrillation which is controlled currently with Amiodarone. Patient is currently in sinus rhythm.[id number] Score: 4. Anticoagulation indicated. Anticoagulation done with eliquis.        History of embolic stroke  Hx of stroke in Jan 2022.     - continue home lipitor  - PT/OT for residual weakness/debilty     Coronary artery disease involving native coronary artery of native heart with unstable angina pectoris  CAD s/p LAUREN to RCA after STEMI in Jan 2022.     - DC aspirin  - Continue statin, plavix, eliquis    Centrilobular emphysema  Clinically asymptomatic      Type 2 diabetes mellitus with hyperglycemia, with long-term current use of insulin  IDDM  Currently poorly controlled  Meds: Metformin 1000 BID, insulin aspart 6  TID, insulin glargine 12 daily    Lab Results   Component Value Date    HGBA1C 9.7 (H) 04/05/2022     - detemir 8, aspart 3 TID  - eats small breakfast, and bigger lunch/dinner. May need different doses for premeal insulin by meal.   - consider SGLT-2 inhibitor at discharge  - endocrinology referral at discharge      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  Will continue home dose gabapentin 200 TID      Hypertension associated with diabetes  Normotensive on admission.     - continue home dose metoprolol succinate 50 daily and losartan 25 daily      VTE Risk Mitigation (From admission, onward)         Ordered     apixaban tablet 5 mg  2 times daily         04/05/22 1601     IP VTE HIGH RISK PATIENT  Once         04/05/22 1553     Place sequential compression device  Until discontinued         04/05/22 1553     Place sequential compression device  Until discontinued         04/05/22 1547                Discharge Planning   ALLIE: 4/7/2022     Code Status: Full Code   Is the patient medically ready for discharge?:     Reason for patient still in hospital (select all that apply): Treatment and PT / OT recommendations  Discharge Plan A: Home Health                  Ange Alexander MD  Department of Hospital Medicine   [facility] - Intensive Care ([place])

## 2022-04-06 NOTE — PLAN OF CARE
[facility] ([place] [address])  Initial Discharge Assessment       Primary Care Provider: Basim Guerrero MD    Admission Diagnosis: Hyperglycemia [R73.9]  Chest pain [R07.9]  Diabetic ketoacidosis without coma associated with other specified diabetes mellitus [E13.10]    Admission Date: 4/5/2022  Expected Discharge Date: 4/7/2022    Discharge Barriers Identified: None    Payor: [employer] MANAGED MEDICARE / Plan: [employer] MEDICARE HMO / Product Type: Capitation /     Extended Emergency Contact Information  Primary Emergency Contact: Basia Herrera  Mobile Phone: [phone]  Relation: Daughter  Secondary Emergency Contact: Marisa Sampson  Mobile Phone: [phone]  Relation: Daughter  Preferred language: English   needed? No    Discharge Plan A: Home Health  Discharge Plan B: [facility]      [id number] - [place] - [address] AT [facility] [address]  [address]  [place] [address]  Phone: [phone] Fax: [phone]    [facility] [place]  [address] [facility] [address]  [place] [address]  Phone: [phone] Fax: [phone]      Initial Assessment (most recent)     Adult Discharge Assessment - 04/06/22 1006        Discharge Assessment    Assessment Type Discharge Planning Assessment     Confirmed/corrected address, phone number and insurance Yes     Confirmed Demographics Correct on Facesheet     Source of Information patient     Does patient/caregiver understand observation status Yes     Communicated ALLIE with patient/caregiver Date not available/Unable to determine     Reason For Admission Diabetic ketoacidosis without coma associated with type 2 diabetes mellitus     Lives With spouse     Facility Arrived From: Home     Do you expect to return to your current living situation? Yes     Do you have help at home or someone to help you manage your care at home? No   Patient reports neighbor across the street helps him with ADLs.    Prior  to hospitilization cognitive status: Unable to Assess     Current cognitive status: Alert/Oriented     Walking or Climbing Stairs Difficulty ambulation difficulty, requires equipment     Mobility Management Rolling walker     Dressing/Bathing Difficulty bathing difficulty, requires equipment     Dressing/Bathing Management Has built-in shower bench     Home Layout Able to live on 1st floor     Equipment Currently Used at Home walker, rolling     Readmission within 30 days? Yes   Previously admitted due to COVID    Patient currently being followed by outpatient case management? No     Do you currently have service(s) that help you manage your care at home? Yes     Name and Contact number of agency [facility]     Is the pt/caregiver preference to resume services with current agency Yes     Do you take prescription medications? Yes     Do you have prescription coverage? Yes     Coverage [employer] Medicare HMO     Do you have any problems affording any of your prescribed medications? No     Is the patient taking medications as prescribed? yes     Who is going to help you get home at discharge? Daughter, Basia Herrera ([phone]) or Marisa Herreraarado ([phone])     How do you get to doctors appointments? family or friend will provide     Are you on dialysis? No     Do you take coumadin? No     Discharge Plan A Home Health     Discharge Plan B Home Health     DME Needed Upon Discharge  other (see comments)   TBD    Discharge Plan discussed with: Patient     Discharge Barriers Identified None        Relationship/Environment    Name(s) of Who Lives With Patient Aimee Muñoz (spouse) [phone]               JASIEL met with patient in room for Discharge Planning Assessment.  Patient was able to answer questions.  Per patient, he lives with Aimee Muñoz (spouse) [phone] in a single story residence with one step(s) to enter.   Per patient, he was moderately independent with ADLS and used rolling walker for ambulation.   Patient will have assistance from daughters upon discharge.  All questions addressed.  Assigned SW/CM will follow for needs.    Leslie Aguilera, [facility]  [phone]

## 2022-04-06 NOTE — NURSING
CALLING TO REPORT ANION GAP 7 WITH 2208  AND DRIP AT 0.65 UNITS/HR ivf NEED TO BE ADJUSTED CALLING FOR ORDERS, PAGED AT [phone] AWAITING CALLBACK

## 2022-04-06 NOTE — ASSESSMENT & PLAN NOTE
Patient with Paroxysmal (<7 days) atrial fibrillation which is controlled currently with Amiodarone. Patient is currently in sinus rhythm.[id number] Score: 4. Anticoagulation indicated. Anticoagulation done with eliquis.

## 2022-04-06 NOTE — PLAN OF CARE
OT eval completed and POC established 4/6/2022    Problem: Occupational Therapy  Goal: Occupational Therapy Goal  Description: Goals to be met by: 4/20/2022     Patient will increase functional independence with ADLs by performing:    LE Dressing with Supervision.  Grooming while standing with Supervision.  Toileting from toilet with Supervision for hygiene and clothing management.   Toilet transfer to toilet with Supervision.    Outcome: Ongoing, Progressing

## 2022-04-07 LAB
ALBUMIN SERPL BCP-MCNC: 2.4 G/DL (ref 3.5–5.2)
ALP SERPL-CCNC: 101 U/L (ref 55–135)
ALT SERPL W/O P-5'-P-CCNC: 10 U/L (ref 10–44)
ANION GAP SERPL CALC-SCNC: 9 MMOL/L (ref 8–16)
ANION GAP SERPL CALC-SCNC: 9 MMOL/L (ref 8–16)
AST SERPL-CCNC: 18 U/L (ref 10–40)
BASOPHILS # BLD AUTO: 0.05 K/UL (ref 0–0.2)
BASOPHILS NFR BLD: 0.6 % (ref 0–1.9)
BILIRUB SERPL-MCNC: 0.4 MG/DL (ref 0.1–1)
BUN SERPL-MCNC: 15 MG/DL (ref 8–23)
BUN SERPL-MCNC: 19 MG/DL (ref 8–23)
CALCIUM SERPL-MCNC: 8.2 MG/DL (ref 8.7–10.5)
CALCIUM SERPL-MCNC: 8.3 MG/DL (ref 8.7–10.5)
CHLORIDE SERPL-SCNC: 101 MMOL/L (ref 95–110)
CHLORIDE SERPL-SCNC: 104 MMOL/L (ref 95–110)
CO2 SERPL-SCNC: 23 MMOL/L (ref 23–29)
CO2 SERPL-SCNC: 24 MMOL/L (ref 23–29)
CREAT SERPL-MCNC: 0.8 MG/DL (ref 0.5–1.4)
CREAT SERPL-MCNC: 1.2 MG/DL (ref 0.5–1.4)
DIFFERENTIAL METHOD: ABNORMAL
EOSINOPHIL # BLD AUTO: 0.7 K/UL (ref 0–0.5)
EOSINOPHIL NFR BLD: 8.8 % (ref 0–8)
ERYTHROCYTE [DISTWIDTH] IN BLOOD BY AUTOMATED COUNT: 16.4 % (ref 11.5–14.5)
EST. GFR  (AFRICAN AMERICAN): >60 ML/MIN/1.73 M^2
EST. GFR  (AFRICAN AMERICAN): >60 ML/MIN/1.73 M^2
EST. GFR  (NON AFRICAN AMERICAN): 57.6 ML/MIN/1.73 M^2
EST. GFR  (NON AFRICAN AMERICAN): >60 ML/MIN/1.73 M^2
GLUCOSE SERPL-MCNC: 220 MG/DL (ref 70–110)
GLUCOSE SERPL-MCNC: 544 MG/DL (ref 70–110)
HCT VFR BLD AUTO: 37.3 % (ref 40–54)
HGB BLD-MCNC: 11.8 G/DL (ref 14–18)
IMM GRANULOCYTES # BLD AUTO: 0.02 K/UL (ref 0–0.04)
IMM GRANULOCYTES NFR BLD AUTO: 0.2 % (ref 0–0.5)
LYMPHOCYTES # BLD AUTO: 2.2 K/UL (ref 1–4.8)
LYMPHOCYTES NFR BLD: 27 % (ref 18–48)
M TB DNA SPEC QL NAA+PROBE: ABNORMAL
MAGNESIUM SERPL-MCNC: 1.3 MG/DL (ref 1.6–2.6)
MCH RBC QN AUTO: 28.8 PG (ref 27–31)
MCHC RBC AUTO-ENTMCNC: 31.6 G/DL (ref 32–36)
MCV RBC AUTO: 91 FL (ref 82–98)
MONOCYTES # BLD AUTO: 0.7 K/UL (ref 0.3–1)
MONOCYTES NFR BLD: 8.2 % (ref 4–15)
NEUTROPHILS # BLD AUTO: 4.4 K/UL (ref 1.8–7.7)
NEUTROPHILS NFR BLD: 55.2 % (ref 38–73)
NRBC BLD-RTO: 0 /100 WBC
PHOSPHATE SERPL-MCNC: 2.7 MG/DL (ref 2.7–4.5)
PLATELET # BLD AUTO: 227 K/UL (ref 150–450)
PMV BLD AUTO: 9.6 FL (ref 9.2–12.9)
POCT GLUCOSE: 198 MG/DL (ref 70–110)
POCT GLUCOSE: 204 MG/DL (ref 70–110)
POCT GLUCOSE: 250 MG/DL (ref 70–110)
POCT GLUCOSE: 289 MG/DL (ref 70–110)
POCT GLUCOSE: 292 MG/DL (ref 70–110)
POCT GLUCOSE: 293 MG/DL (ref 70–110)
POCT GLUCOSE: 313 MG/DL (ref 70–110)
POCT GLUCOSE: 400 MG/DL (ref 70–110)
POCT GLUCOSE: 463 MG/DL (ref 70–110)
POCT GLUCOSE: >500 MG/DL (ref 70–110)
POTASSIUM SERPL-SCNC: 3.7 MMOL/L (ref 3.5–5.1)
POTASSIUM SERPL-SCNC: 5.3 MMOL/L (ref 3.5–5.1)
PROT SERPL-MCNC: 6.1 G/DL (ref 6–8.4)
RBC # BLD AUTO: 4.1 M/UL (ref 4.6–6.2)
SODIUM SERPL-SCNC: 133 MMOL/L (ref 136–145)
SODIUM SERPL-SCNC: 137 MMOL/L (ref 136–145)
WBC # BLD AUTO: 8.04 K/UL (ref 3.9–12.7)

## 2022-04-07 PROCEDURE — 20600001 HC STEP DOWN PRIVATE ROOM

## 2022-04-07 PROCEDURE — 80053 COMPREHEN METABOLIC PANEL: CPT | Performed by: STUDENT IN AN ORGANIZED HEALTH CARE EDUCATION/TRAINING PROGRAM

## 2022-04-07 PROCEDURE — 99232 SBSQ HOSP IP/OBS MODERATE 35: CPT | Mod: GC,,, | Performed by: HOSPITALIST

## 2022-04-07 PROCEDURE — 99232 PR SUBSEQUENT HOSPITAL CARE,LEVL II: ICD-10-PCS | Mod: GC,,, | Performed by: HOSPITALIST

## 2022-04-07 PROCEDURE — 36415 COLL VENOUS BLD VENIPUNCTURE: CPT | Performed by: STUDENT IN AN ORGANIZED HEALTH CARE EDUCATION/TRAINING PROGRAM

## 2022-04-07 PROCEDURE — 85025 COMPLETE CBC W/AUTO DIFF WBC: CPT

## 2022-04-07 PROCEDURE — 84100 ASSAY OF PHOSPHORUS: CPT

## 2022-04-07 PROCEDURE — 63600175 PHARM REV CODE 636 W HCPCS: Performed by: STUDENT IN AN ORGANIZED HEALTH CARE EDUCATION/TRAINING PROGRAM

## 2022-04-07 PROCEDURE — 83735 ASSAY OF MAGNESIUM: CPT

## 2022-04-07 PROCEDURE — C9399 UNCLASSIFIED DRUGS OR BIOLOG: HCPCS

## 2022-04-07 PROCEDURE — 25000003 PHARM REV CODE 250

## 2022-04-07 PROCEDURE — 80048 BASIC METABOLIC PNL TOTAL CA: CPT | Mod: XB | Performed by: STUDENT IN AN ORGANIZED HEALTH CARE EDUCATION/TRAINING PROGRAM

## 2022-04-07 PROCEDURE — 63600175 PHARM REV CODE 636 W HCPCS

## 2022-04-07 RX ORDER — INSULIN ASPART 100 [IU]/ML
15 INJECTION, SOLUTION INTRAVENOUS; SUBCUTANEOUS ONCE
Status: COMPLETED | OUTPATIENT
Start: 2022-04-07 | End: 2022-04-07

## 2022-04-07 RX ORDER — INSULIN ASPART 100 [IU]/ML
0-5 INJECTION, SOLUTION INTRAVENOUS; SUBCUTANEOUS
Status: DISCONTINUED | OUTPATIENT
Start: 2022-04-07 | End: 2022-04-07

## 2022-04-07 RX ORDER — INSULIN ASPART 100 [IU]/ML
12 INJECTION, SOLUTION INTRAVENOUS; SUBCUTANEOUS
Status: DISCONTINUED | OUTPATIENT
Start: 2022-04-08 | End: 2022-04-07

## 2022-04-07 RX ORDER — INSULIN ASPART 100 [IU]/ML
5 INJECTION, SOLUTION INTRAVENOUS; SUBCUTANEOUS ONCE
Status: COMPLETED | OUTPATIENT
Start: 2022-04-07 | End: 2022-04-07

## 2022-04-07 RX ORDER — INSULIN ASPART 100 [IU]/ML
12 INJECTION, SOLUTION INTRAVENOUS; SUBCUTANEOUS
Status: DISCONTINUED | OUTPATIENT
Start: 2022-04-07 | End: 2022-04-07

## 2022-04-07 RX ORDER — IBUPROFEN 200 MG
16 TABLET ORAL
Status: DISCONTINUED | OUTPATIENT
Start: 2022-04-07 | End: 2022-04-09

## 2022-04-07 RX ORDER — GLUCAGON 1 MG
1 KIT INJECTION
Status: DISCONTINUED | OUTPATIENT
Start: 2022-04-07 | End: 2022-04-09

## 2022-04-07 RX ORDER — MAGNESIUM SULFATE HEPTAHYDRATE 40 MG/ML
2 INJECTION, SOLUTION INTRAVENOUS ONCE
Status: COMPLETED | OUTPATIENT
Start: 2022-04-07 | End: 2022-04-07

## 2022-04-07 RX ORDER — INSULIN ASPART 100 [IU]/ML
1-10 INJECTION, SOLUTION INTRAVENOUS; SUBCUTANEOUS
Status: DISCONTINUED | OUTPATIENT
Start: 2022-04-07 | End: 2022-04-08

## 2022-04-07 RX ORDER — INSULIN ASPART 100 [IU]/ML
7 INJECTION, SOLUTION INTRAVENOUS; SUBCUTANEOUS ONCE
Status: DISCONTINUED | OUTPATIENT
Start: 2022-04-07 | End: 2022-04-07

## 2022-04-07 RX ORDER — IBUPROFEN 200 MG
24 TABLET ORAL
Status: DISCONTINUED | OUTPATIENT
Start: 2022-04-07 | End: 2022-04-09

## 2022-04-07 RX ORDER — INSULIN ASPART 100 [IU]/ML
12 INJECTION, SOLUTION INTRAVENOUS; SUBCUTANEOUS
Status: DISCONTINUED | OUTPATIENT
Start: 2022-04-07 | End: 2022-04-08

## 2022-04-07 RX ORDER — POTASSIUM CHLORIDE 20 MEQ/1
40 TABLET, EXTENDED RELEASE ORAL ONCE
Status: COMPLETED | OUTPATIENT
Start: 2022-04-07 | End: 2022-04-07

## 2022-04-07 RX ADMIN — LACOSAMIDE 100 MG: 100 TABLET, FILM COATED ORAL at 08:04

## 2022-04-07 RX ADMIN — MAGNESIUM SULFATE HEPTAHYDRATE 2 G: 40 INJECTION, SOLUTION INTRAVENOUS at 09:04

## 2022-04-07 RX ADMIN — POTASSIUM CHLORIDE 40 MEQ: 1500 TABLET, EXTENDED RELEASE ORAL at 09:04

## 2022-04-07 RX ADMIN — GABAPENTIN 200 MG: 100 CAPSULE ORAL at 08:04

## 2022-04-07 RX ADMIN — ATORVASTATIN CALCIUM 40 MG: 20 TABLET, FILM COATED ORAL at 09:04

## 2022-04-07 RX ADMIN — INSULIN ASPART 5 UNITS: 100 INJECTION, SOLUTION INTRAVENOUS; SUBCUTANEOUS at 12:04

## 2022-04-07 RX ADMIN — AMIODARONE HYDROCHLORIDE 200 MG: 200 TABLET ORAL at 09:04

## 2022-04-07 RX ADMIN — GABAPENTIN 200 MG: 100 CAPSULE ORAL at 09:04

## 2022-04-07 RX ADMIN — INSULIN ASPART 7 UNITS: 100 INJECTION, SOLUTION INTRAVENOUS; SUBCUTANEOUS at 09:04

## 2022-04-07 RX ADMIN — INSULIN ASPART 3 UNITS: 100 INJECTION, SOLUTION INTRAVENOUS; SUBCUTANEOUS at 08:04

## 2022-04-07 RX ADMIN — LACOSAMIDE 100 MG: 100 TABLET, FILM COATED ORAL at 09:04

## 2022-04-07 RX ADMIN — MUPIROCIN: 20 OINTMENT TOPICAL at 08:04

## 2022-04-07 RX ADMIN — INSULIN ASPART 15 UNITS: 100 INJECTION, SOLUTION INTRAVENOUS; SUBCUTANEOUS at 02:04

## 2022-04-07 RX ADMIN — INSULIN ASPART 3 UNITS: 100 INJECTION, SOLUTION INTRAVENOUS; SUBCUTANEOUS at 09:04

## 2022-04-07 RX ADMIN — INSULIN ASPART 12 UNITS: 100 INJECTION, SOLUTION INTRAVENOUS; SUBCUTANEOUS at 06:04

## 2022-04-07 RX ADMIN — CLOPIDOGREL 75 MG: 75 TABLET, FILM COATED ORAL at 09:04

## 2022-04-07 RX ADMIN — METOPROLOL SUCCINATE 50 MG: 50 TABLET, EXTENDED RELEASE ORAL at 09:04

## 2022-04-07 RX ADMIN — INSULIN ASPART 7 UNITS: 100 INJECTION, SOLUTION INTRAVENOUS; SUBCUTANEOUS at 12:04

## 2022-04-07 RX ADMIN — LOSARTAN POTASSIUM 25 MG: 25 TABLET, FILM COATED ORAL at 09:04

## 2022-04-07 RX ADMIN — CETIRIZINE HYDROCHLORIDE 10 MG: 10 TABLET, FILM COATED ORAL at 08:04

## 2022-04-07 RX ADMIN — APIXABAN 5 MG: 5 TABLET, FILM COATED ORAL at 09:04

## 2022-04-07 RX ADMIN — PANTOPRAZOLE SODIUM 40 MG: 40 TABLET, DELAYED RELEASE ORAL at 09:04

## 2022-04-07 RX ADMIN — GABAPENTIN 200 MG: 100 CAPSULE ORAL at 03:04

## 2022-04-07 RX ADMIN — APIXABAN 5 MG: 5 TABLET, FILM COATED ORAL at 08:04

## 2022-04-07 RX ADMIN — INSULIN ASPART 15 UNITS: 100 INJECTION, SOLUTION INTRAVENOUS; SUBCUTANEOUS at 04:04

## 2022-04-07 RX ADMIN — MUPIROCIN: 20 OINTMENT TOPICAL at 09:04

## 2022-04-07 RX ADMIN — INSULIN DETEMIR 15 UNITS: 100 INJECTION, SOLUTION SUBCUTANEOUS at 08:04

## 2022-04-07 NOTE — PLAN OF CARE
04/07/22 1335   Discharge Reassessment   Assessment Type Discharge Planning Reassessment   Did the patient's condition or plan change since previous assessment? No   Discharge Plan discussed with: Patient   Communicated ALLIE with patient/caregiver Date not available/Unable to determine   Discharge Plan A Home Health   Discharge Plan B Home Health   DME Needed Upon Discharge  other (see comments)  (TBD)   Discharge Barriers Identified None   Why the patient remains in the hospital Requires continued medical care   Post-Acute Status   Post-Acute Authorization Home Health   Home Health Status Referrals Sent   Coverage [employer] Managed HMO Medicare   Discharge Delays None known at this time     Patient not medically stable for dc. Patient previously received services from [facility]. Contacted [facility] ([phone]) and they reported that they are not willing to take patient back for HH needs. They refused to give a reason and forwarded me to [facility] who reported that they are full and also cannot take the patient. Provided the  with my contact information. She reported that she would try to find out why [facility] is refusing to resume services with patient and will call me back.

## 2022-04-07 NOTE — CARE UPDATE
Spoke to patient's son Kamar Muñoz . He had some concerns about his father's inpatient diet especially since he received lasagna and bread for lunch. Explained that he is already placed on diabetic diet, and that we would reach out to dietary to reinforce that he needs diabetic diet. Advised that he should follow up outpatient with endocrinology for possible continuous glucose monitor. Mr. Muñoz explained that they have a  for his father and that both of his parents are ill, requiring multiple hospital admission. In his mother's case, her renal failure is making it so she's unable to care for Mr. Toni Sr. The caregiver they hired also cooks for them, but leaves at 5pm, so the son is concerned about his father's BG overnight. Previously sent home with .  nurse discovered BG over 500. The son is concerned that is not adequate monitoring. Advised them to pursue assisted living and discuss with his parents and siblings.       5:35pm: Called dietary, patient has chicken caesar salad and apple juice ordered. Advised to take of apple juice and pass on low carb/sugar diabetic diet to day dietary team.

## 2022-04-07 NOTE — SUBJECTIVE & OBJECTIVE
Interval History: NAOE, hyperglycemic during the day. Insulin increased. Diarrhea resolved.     Review of Systems   Constitutional:  Negative for chills and fever.   HENT: Negative.     Eyes: Negative.    Respiratory:  Negative for cough and shortness of breath.    Cardiovascular:  Negative for chest pain, palpitations and leg swelling.   Gastrointestinal:  Negative for abdominal pain, constipation, diarrhea, nausea and vomiting.   Endocrine: Positive for polyuria.   Genitourinary:  Negative for dysuria and hematuria.   Musculoskeletal:  Positive for back pain (sacral). Negative for arthralgias.   Skin:  Positive for wound.   Neurological:  Positive for weakness. Negative for dizziness and headaches.   Hematological: Negative.    Psychiatric/Behavioral: Negative.     Objective:     Vital Signs (Most Recent):  Temp: 98.1 °F (36.7 °C) (04/07/22 1104)  Pulse: 70 (04/07/22 1429)  Resp: 14 (04/07/22 1104)  BP: 135/73 (04/07/22 1104)  SpO2: 99 % (04/07/22 1104) Vital Signs (24h Range):  Temp:  [97.5 °F (36.4 °C)-98.3 °F (36.8 °C)] 98.1 °F (36.7 °C)  Pulse:  [58-74] 70  Resp:  [14-19] 14  SpO2:  [16 %-99 %] 99 %  BP: (135-158)/(67-81) 135/73     Weight: 75 kg (165 lb 5.5 oz)  Body mass index is 21.23 kg/m².    Intake/Output Summary (Last 24 hours) at 4/7/2022 1616  Last data filed at 4/7/2022 1100  Gross per 24 hour   Intake 260 ml   Output 400 ml   Net -140 ml      Physical Exam  Constitutional:       Appearance: Normal appearance.   HENT:      Head: Normocephalic and atraumatic.      Nose: Nose normal.      Mouth/Throat:      Mouth: Mucous membranes are dry.   Eyes:      General: No scleral icterus.  Cardiovascular:      Rate and Rhythm: Normal rate and regular rhythm.      Pulses: Normal pulses.      Heart sounds: Normal heart sounds. No murmur heard.  Pulmonary:      Effort: Pulmonary effort is normal. No respiratory distress.      Breath sounds: Normal breath sounds. No wheezing or rales.   Abdominal:      General:  Bowel sounds are normal. There is no distension.      Palpations: Abdomen is soft.   Musculoskeletal:         General: Normal range of motion.      Cervical back: Normal range of motion. No rigidity.      Right lower leg: No edema.      Left lower leg: No edema.   Skin:     General: Skin is dry.          Neurological:      General: No focal deficit present.      Mental Status: He is alert and oriented to person, place, and time.      Motor: No weakness.   Psychiatric:         Mood and Affect: Mood normal.         Behavior: Behavior normal.       Significant Labs: All pertinent labs within the past 24 hours have been reviewed.  CBC:   Recent Labs   Lab 04/06/22  0324 04/07/22  0416   WBC 7.87 8.04   HGB 11.9* 11.8*   HCT 37.2* 37.3*    227     CMP:   Recent Labs   Lab 04/06/22  0324 04/06/22  0804 04/07/22  0416    141 137   K 4.1 4.9 3.7    105 104   CO2 26 27 24   * 123* 220*   BUN 25* 23 15   CREATININE 1.0 1.1 0.8   CALCIUM 8.6* 8.8 8.2*   PROT  --   --  6.1   ALBUMIN  --   --  2.4*   BILITOT  --   --  0.4   ALKPHOS  --   --  101   AST  --   --  18   ALT  --   --  10   ANIONGAP 10 9 9   EGFRNONAA >60.0 >60.0 >60.0     POCT Glucose:   Recent Labs   Lab 04/07/22  1100 04/07/22  1431 04/07/22  1534   POCTGLUCOSE 400* >500* 463*       Significant Imaging: I have reviewed all pertinent imaging results/findings within the past 24 hours.

## 2022-04-07 NOTE — ASSESSMENT & PLAN NOTE
IDDM  Currently poorly controlled  Meds: Metformin 1000 BID, insulin aspart 6 TID, insulin glargine 12 daily    Lab Results   Component Value Date    HGBA1C 9.7 (H) 04/05/2022     - detemir 15, aspart 12 TID  - eats small breakfast, and bigger lunch/dinner. May need different doses for premeal insulin by meal.   - consider outpt dexcom  - endocrinology referral at discharge

## 2022-04-07 NOTE — ASSESSMENT & PLAN NOTE
DKA likely 2/2 to inadequate insulin regimen/diet noncompliance/difficulty managing meds at home given that his wife is also ill. Poorly controlled at SNF prior to DC, and likely continuation of that. No evidence of UTI or pneumonia at this time. Does not meet SIRdS/sepsis criteria. Possible source may be sacral injury, but does not appear infected at this time. Insulin drip and IVF on 4/5-4/6. Elevated BG at 500s again on 4/7    -- poct glucose ACHS and 2am  -- HbA1C 9.7  -- diabetic diet and boost glucose control  -- AG closed, bicarb wnl as of 4/6

## 2022-04-07 NOTE — MEDICAL/APP STUDENT
Progress Note  Hospital Medicine                                                              Team: [facility] MED 1    Patient Name: Kamar Muñoz  YOB: 1945    Admit Date: 4/5/2022                     LOS: 2    SUBJECTIVE:     Reason for Admission:  Diabetic ketoacidosis without coma associated with type 2 diabetes mellitus  See H&P for detailed initial presentation history and ROS.      Interval history: No acute events overnight. Pt states that he is feeling well. He got up out of bed yesterday and went to the bathroom and sat in the chair. He had some slight dizziness when walking.       OBJECTIVE:     Vital Signs Range (Last 24H):  Temp:  [97.5 °F (36.4 °C)-98.3 °F (36.8 °C)]   Pulse:  [58-74]   Resp:  [14-19]   BP: (135-158)/(67-81)   SpO2:  [16 %-99 %] Body mass index is 21.23 kg/m².  Wt Readings from Last 3 Encounters:   04/06/22 1100 75 kg (165 lb 5.5 oz)   04/06/22 0426 75 kg (165 lb 5.5 oz)   04/05/22 1101 75 kg (165 lb 5.6 oz)   03/27/22 0515 75 kg (165 lb 5.5 oz)   03/20/22 0332 75.2 kg (165 lb 12.6 oz)   03/16/22 0500 77.4 kg (170 lb 10.2 oz)   03/11/22 1454 77 kg (169 lb 12.1 oz)   03/10/22 1633 77 kg (169 lb 12.1 oz)   02/21/22 2300 76.4 kg (168 lb 6.9 oz)   02/21/22 1100 76.4 kg (168 lb 6.9 oz)   02/20/22 0004 76.4 kg (168 lb 6.9 oz)   02/19/22 1852 94.8 kg (209 lb)       I & O (Last 24H):    Intake/Output Summary (Last 24 hours) at 4/7/2022 1409  Last data filed at 4/7/2022 0724  Gross per 24 hour   Intake 260 ml   Output 400 ml   Net -140 ml       Physical Exam:  Physical Exam  Vitals reviewed.   Constitutional:       Appearance: Normal appearance. He is normal weight.   Eyes:      Pupils: Pupils are equal, round, and reactive to light.   Cardiovascular:      Rate and Rhythm: Normal rate and regular rhythm.      Pulses: Normal pulses.      Heart sounds: Normal heart sounds.   Pulmonary:      Effort: Pulmonary effort is normal.      Breath sounds: Normal breath sounds.    Abdominal:      General: Abdomen is flat. Bowel sounds are normal.      Palpations: Abdomen is soft.   Musculoskeletal:         General: Normal range of motion.   Skin:     General: Skin is warm and dry.      Capillary Refill: Capillary refill takes less than 2 seconds.   Neurological:      Mental Status: He is alert and oriented to person, place, and time.      Motor: Weakness present.      Comments: Some weakness from prior stroke   Psychiatric:         Mood and Affect: Mood normal.         Behavior: Behavior normal.         Diagnostic Results:  Lab Results   Component Value Date    WBC 8.04 04/07/2022    HGB 11.8 (L) 04/07/2022    HCT 37.3 (L) 04/07/2022    MCV 91 04/07/2022     04/07/2022     Recent Labs   Lab 04/07/22  0416   *      K 3.7      CO2 24   BUN 15   CREATININE 0.8   CALCIUM 8.2*   MG 1.3*     Lab Results   Component Value Date    INR 1.2 02/19/2022    INR 1.1 02/17/2022    INR 1.0 01/25/2022     Lab Results   Component Value Date    HGBA1C 9.7 (H) 04/05/2022     Recent Labs     04/06/22  0913 04/06/22  1025 04/06/22  1248 04/06/22  1720 04/06/22  1946 04/07/22  0222 04/07/22  0816 04/07/22  1100   POCTGLUCOSE 128* 178* 240* 293* 286* 198* 292* 400*         ASSESSMENT/PLAN:     Current Problems List:  Active Hospital Problems    Diagnosis  POA    *Diabetic ketoacidosis without coma associated with type 2 diabetes mellitus [E11.10]  Yes    Severe malnutrition [E43]  Unknown    Chronic anticoagulation [Z79.01]  Not Applicable    Focal seizures [R56.9]  Yes     Chronic    History of ST elevation myocardial infarction (STEMI) [I25.2]  Not Applicable     Chronic    Stented coronary artery [Z95.5]  Not Applicable    Paroxysmal atrial fibrillation [I48.0]  Yes     Chronic    History of embolic stroke [Z86.73]  Not Applicable    Coronary artery disease involving native coronary artery of native heart with unstable angina pectoris [I25.110]  Yes     Chronic     Centrilobular emphysema [J43.2]  Yes     Hyperinflated cxr.  Emphysematous changes on lung cuts on ct abd.  Clinically asymptomatic aside from cough.  Baseline pft.  Prn albuterol.        Type 2 diabetes mellitus with hyperglycemia, with long-term current use of insulin [E11.65, Z79.4]  Not Applicable     Chronic    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 [E13.40]  Yes     Chronic    Hypertension associated with diabetes [E11.59, I15.2]  Yes     Chronic      Resolved Hospital Problems   No resolved problems to display.       Active Problems, Status & Treatment Plan Addressed Today:  DM Type II  -Pt still has poorly controlled blood glucose  -increase basal insulin to detemir 5  -increase bolus insulin to aspart 4, 4, 6  -consult endocrine    Hypertension  -slightly hypertensive with systolic BP in the 140s/150s  -continue home metoprolol succinate 50 and losartan 25   -continue to monitor and consider increasing meds if necessary    Afib  -controlled with amiodarone 200  -continue anticoagulation of apixaban 5 BID and clopidogrel 75    Neuropathy  -continue gabapentin    Hx stroke with residual weakness  -no acute PT needed  -continue with PT with home health at discharge    Signing Student:  Deloris Guido MS3

## 2022-04-08 PROBLEM — E11.649 HYPOGLYCEMIA UNAWARENESS ASSOCIATED WITH TYPE 2 DIABETES MELLITUS: Status: ACTIVE | Noted: 2022-04-08

## 2022-04-08 PROBLEM — R41.82 ALTERED MENTAL STATUS: Status: RESOLVED | Noted: 2022-01-25 | Resolved: 2022-04-08

## 2022-04-08 PROBLEM — R65.20 SEVERE SEPSIS: Status: RESOLVED | Noted: 2022-02-20 | Resolved: 2022-04-08

## 2022-04-08 PROBLEM — U07.1 COVID-19 VIRUS INFECTION: Status: RESOLVED | Noted: 2022-02-18 | Resolved: 2022-04-08

## 2022-04-08 PROBLEM — E87.5 HYPERKALEMIA: Status: RESOLVED | Noted: 2022-01-25 | Resolved: 2022-04-08

## 2022-04-08 PROBLEM — G93.41 ENCEPHALOPATHY, METABOLIC: Status: RESOLVED | Noted: 2022-01-25 | Resolved: 2022-04-08

## 2022-04-08 PROBLEM — R65.10 SIRS DUE TO NON-INFECTIOUS PROCESS WITHOUT ACUTE ORGAN DYSFUNCTION: Status: RESOLVED | Noted: 2022-01-29 | Resolved: 2022-04-08

## 2022-04-08 PROBLEM — A41.9 SEVERE SEPSIS: Status: RESOLVED | Noted: 2022-02-20 | Resolved: 2022-04-08

## 2022-04-08 PROBLEM — R11.2 NAUSEA & VOMITING: Status: RESOLVED | Noted: 2022-01-29 | Resolved: 2022-04-08

## 2022-04-08 LAB
ALBUMIN SERPL BCP-MCNC: 2.5 G/DL (ref 3.5–5.2)
ALP SERPL-CCNC: 114 U/L (ref 55–135)
ALT SERPL W/O P-5'-P-CCNC: 19 U/L (ref 10–44)
ANION GAP SERPL CALC-SCNC: 8 MMOL/L (ref 8–16)
AST SERPL-CCNC: 30 U/L (ref 10–40)
BASOPHILS # BLD AUTO: 0.06 K/UL (ref 0–0.2)
BASOPHILS NFR BLD: 0.7 % (ref 0–1.9)
BILIRUB SERPL-MCNC: 0.5 MG/DL (ref 0.1–1)
BUN SERPL-MCNC: 17 MG/DL (ref 8–23)
CALCIUM SERPL-MCNC: 8.5 MG/DL (ref 8.7–10.5)
CHLORIDE SERPL-SCNC: 105 MMOL/L (ref 95–110)
CO2 SERPL-SCNC: 26 MMOL/L (ref 23–29)
CREAT SERPL-MCNC: 0.8 MG/DL (ref 0.5–1.4)
DIFFERENTIAL METHOD: ABNORMAL
EOSINOPHIL # BLD AUTO: 0.7 K/UL (ref 0–0.5)
EOSINOPHIL NFR BLD: 7.6 % (ref 0–8)
ERYTHROCYTE [DISTWIDTH] IN BLOOD BY AUTOMATED COUNT: 16.1 % (ref 11.5–14.5)
EST. GFR  (AFRICAN AMERICAN): >60 ML/MIN/1.73 M^2
EST. GFR  (NON AFRICAN AMERICAN): >60 ML/MIN/1.73 M^2
GLUCOSE SERPL-MCNC: 76 MG/DL (ref 70–110)
HCT VFR BLD AUTO: 38.6 % (ref 40–54)
HGB BLD-MCNC: 12.7 G/DL (ref 14–18)
IMM GRANULOCYTES # BLD AUTO: 0.02 K/UL (ref 0–0.04)
IMM GRANULOCYTES NFR BLD AUTO: 0.2 % (ref 0–0.5)
LYMPHOCYTES # BLD AUTO: 2.3 K/UL (ref 1–4.8)
LYMPHOCYTES NFR BLD: 27 % (ref 18–48)
MAGNESIUM SERPL-MCNC: 1.5 MG/DL (ref 1.6–2.6)
MCH RBC QN AUTO: 29 PG (ref 27–31)
MCHC RBC AUTO-ENTMCNC: 32.9 G/DL (ref 32–36)
MCV RBC AUTO: 88 FL (ref 82–98)
MONOCYTES # BLD AUTO: 0.7 K/UL (ref 0.3–1)
MONOCYTES NFR BLD: 8.4 % (ref 4–15)
NEUTROPHILS # BLD AUTO: 4.9 K/UL (ref 1.8–7.7)
NEUTROPHILS NFR BLD: 56.1 % (ref 38–73)
NRBC BLD-RTO: 0 /100 WBC
PHOSPHATE SERPL-MCNC: 2.4 MG/DL (ref 2.7–4.5)
PLATELET # BLD AUTO: 244 K/UL (ref 150–450)
PMV BLD AUTO: 9.9 FL (ref 9.2–12.9)
POCT GLUCOSE: 160 MG/DL (ref 70–110)
POCT GLUCOSE: 171 MG/DL (ref 70–110)
POCT GLUCOSE: 304 MG/DL (ref 70–110)
POCT GLUCOSE: 364 MG/DL (ref 70–110)
POCT GLUCOSE: 50 MG/DL (ref 70–110)
POCT GLUCOSE: 85 MG/DL (ref 70–110)
POCT GLUCOSE: 94 MG/DL (ref 70–110)
POTASSIUM SERPL-SCNC: 3.7 MMOL/L (ref 3.5–5.1)
PROT SERPL-MCNC: 6.5 G/DL (ref 6–8.4)
RBC # BLD AUTO: 4.38 M/UL (ref 4.6–6.2)
SODIUM SERPL-SCNC: 139 MMOL/L (ref 136–145)
WBC # BLD AUTO: 8.66 K/UL (ref 3.9–12.7)

## 2022-04-08 PROCEDURE — 99232 SBSQ HOSP IP/OBS MODERATE 35: CPT | Mod: GC,,, | Performed by: HOSPITALIST

## 2022-04-08 PROCEDURE — 84100 ASSAY OF PHOSPHORUS: CPT

## 2022-04-08 PROCEDURE — 85025 COMPLETE CBC W/AUTO DIFF WBC: CPT

## 2022-04-08 PROCEDURE — 99232 PR SUBSEQUENT HOSPITAL CARE,LEVL II: ICD-10-PCS | Mod: GC,,, | Performed by: HOSPITALIST

## 2022-04-08 PROCEDURE — 63600175 PHARM REV CODE 636 W HCPCS: Performed by: STUDENT IN AN ORGANIZED HEALTH CARE EDUCATION/TRAINING PROGRAM

## 2022-04-08 PROCEDURE — 20600001 HC STEP DOWN PRIVATE ROOM

## 2022-04-08 PROCEDURE — 83735 ASSAY OF MAGNESIUM: CPT

## 2022-04-08 PROCEDURE — 97530 THERAPEUTIC ACTIVITIES: CPT

## 2022-04-08 PROCEDURE — C9399 UNCLASSIFIED DRUGS OR BIOLOG: HCPCS | Performed by: GENERAL ACUTE CARE HOSPITAL

## 2022-04-08 PROCEDURE — 25000003 PHARM REV CODE 250

## 2022-04-08 PROCEDURE — 80053 COMPREHEN METABOLIC PANEL: CPT | Performed by: STUDENT IN AN ORGANIZED HEALTH CARE EDUCATION/TRAINING PROGRAM

## 2022-04-08 PROCEDURE — 25000003 PHARM REV CODE 250: Performed by: GENERAL ACUTE CARE HOSPITAL

## 2022-04-08 RX ORDER — INSULIN ASPART 100 [IU]/ML
0-5 INJECTION, SOLUTION INTRAVENOUS; SUBCUTANEOUS
Status: DISCONTINUED | OUTPATIENT
Start: 2022-04-08 | End: 2022-04-09

## 2022-04-08 RX ORDER — MAGNESIUM SULFATE HEPTAHYDRATE 40 MG/ML
2 INJECTION, SOLUTION INTRAVENOUS ONCE
Status: COMPLETED | OUTPATIENT
Start: 2022-04-08 | End: 2022-04-08

## 2022-04-08 RX ORDER — INSULIN ASPART 100 [IU]/ML
6 INJECTION, SOLUTION INTRAVENOUS; SUBCUTANEOUS
Status: DISCONTINUED | OUTPATIENT
Start: 2022-04-09 | End: 2022-04-09

## 2022-04-08 RX ADMIN — PANTOPRAZOLE SODIUM 40 MG: 40 TABLET, DELAYED RELEASE ORAL at 08:04

## 2022-04-08 RX ADMIN — GABAPENTIN 200 MG: 100 CAPSULE ORAL at 08:04

## 2022-04-08 RX ADMIN — GABAPENTIN 200 MG: 100 CAPSULE ORAL at 02:04

## 2022-04-08 RX ADMIN — Medication 16 G: at 08:04

## 2022-04-08 RX ADMIN — INSULIN ASPART 12 UNITS: 100 INJECTION, SOLUTION INTRAVENOUS; SUBCUTANEOUS at 01:04

## 2022-04-08 RX ADMIN — MUPIROCIN: 20 OINTMENT TOPICAL at 08:04

## 2022-04-08 RX ADMIN — LOSARTAN POTASSIUM 25 MG: 25 TABLET, FILM COATED ORAL at 08:04

## 2022-04-08 RX ADMIN — MUPIROCIN: 20 OINTMENT TOPICAL at 09:04

## 2022-04-08 RX ADMIN — LACOSAMIDE 100 MG: 100 TABLET, FILM COATED ORAL at 09:04

## 2022-04-08 RX ADMIN — Medication 6 MG: at 09:04

## 2022-04-08 RX ADMIN — CLOPIDOGREL 75 MG: 75 TABLET, FILM COATED ORAL at 08:04

## 2022-04-08 RX ADMIN — MAGNESIUM SULFATE 2 G: 2 INJECTION INTRAVENOUS at 08:04

## 2022-04-08 RX ADMIN — APIXABAN 5 MG: 5 TABLET, FILM COATED ORAL at 08:04

## 2022-04-08 RX ADMIN — LACOSAMIDE 100 MG: 100 TABLET, FILM COATED ORAL at 08:04

## 2022-04-08 RX ADMIN — METOPROLOL SUCCINATE 50 MG: 50 TABLET, EXTENDED RELEASE ORAL at 08:04

## 2022-04-08 RX ADMIN — AMIODARONE HYDROCHLORIDE 200 MG: 200 TABLET ORAL at 08:04

## 2022-04-08 RX ADMIN — GABAPENTIN 200 MG: 100 CAPSULE ORAL at 09:04

## 2022-04-08 RX ADMIN — LIDOCAINE 1 PATCH: 50 PATCH CUTANEOUS at 11:04

## 2022-04-08 RX ADMIN — CETIRIZINE HYDROCHLORIDE 10 MG: 10 TABLET, FILM COATED ORAL at 08:04

## 2022-04-08 RX ADMIN — INSULIN DETEMIR 4 UNITS: 100 INJECTION, SOLUTION SUBCUTANEOUS at 11:04

## 2022-04-08 RX ADMIN — ATORVASTATIN CALCIUM 40 MG: 20 TABLET, FILM COATED ORAL at 08:04

## 2022-04-08 RX ADMIN — INSULIN ASPART 12 UNITS: 100 INJECTION, SOLUTION INTRAVENOUS; SUBCUTANEOUS at 05:04

## 2022-04-08 NOTE — SUBJECTIVE & OBJECTIVE
Interval History: Yesterday patient required around 67 units of aspart for sugars >500. This AM, hypoglycemic and morning aspart held. He reports polyuria and polydipsia. Hemodynamically stable.    Review of Systems   Constitutional:  Negative for chills and fever.   HENT: Negative.     Eyes: Negative.    Respiratory:  Negative for cough and shortness of breath.    Cardiovascular:  Negative for chest pain, palpitations and leg swelling.   Gastrointestinal:  Negative for abdominal pain, constipation, diarrhea, nausea and vomiting.   Endocrine: Positive for polyuria.   Genitourinary:  Negative for dysuria and hematuria.   Musculoskeletal:  Positive for back pain (sacral). Negative for arthralgias.   Skin:  Positive for wound.   Neurological:  Positive for weakness. Negative for dizziness and headaches.   Hematological: Negative.    Psychiatric/Behavioral: Negative.     Objective:     Vital Signs (Most Recent):  Temp: 97.4 °F (36.3 °C) (04/08/22 1157)  Pulse: 61 (04/08/22 1157)  Resp: 18 (04/08/22 1157)  BP: 119/70 (04/08/22 1157)  SpO2: 97 % (04/08/22 1157) Vital Signs (24h Range):  Temp:  [97.4 °F (36.3 °C)-98.3 °F (36.8 °C)] 97.4 °F (36.3 °C)  Pulse:  [57-77] 61  Resp:  [18-20] 18  SpO2:  [94 %-99 %] 97 %  BP: (118-151)/(60-80) 119/70     Weight: 75 kg (165 lb 5.5 oz)  Body mass index is 21.23 kg/m².    Intake/Output Summary (Last 24 hours) at 4/8/2022 1410  Last data filed at 4/8/2022 1200  Gross per 24 hour   Intake 550 ml   Output 825 ml   Net -275 ml      Physical Exam  Constitutional:       Appearance: Normal appearance.   HENT:      Head: Normocephalic and atraumatic.      Nose: Nose normal.      Mouth/Throat:      Mouth: Mucous membranes are dry.   Eyes:      General: No scleral icterus.  Cardiovascular:      Rate and Rhythm: Normal rate and regular rhythm.      Pulses: Normal pulses.      Heart sounds: Normal heart sounds. No murmur heard.  Pulmonary:      Effort: Pulmonary effort is normal. No respiratory  distress.      Breath sounds: Normal breath sounds. No wheezing or rales.   Abdominal:      General: Bowel sounds are normal. There is no distension.      Palpations: Abdomen is soft.   Musculoskeletal:         General: Normal range of motion.      Cervical back: Normal range of motion. No rigidity.      Right lower leg: No edema.      Left lower leg: No edema.   Skin:     General: Skin is dry.          Neurological:      General: No focal deficit present.      Mental Status: He is alert and oriented to person, place, and time.      Motor: No weakness.   Psychiatric:         Mood and Affect: Mood normal.         Behavior: Behavior normal.       Significant Labs: All pertinent labs within the past 24 hours have been reviewed.    Significant Imaging: I have reviewed all pertinent imaging results/findings within the past 24 hours.

## 2022-04-08 NOTE — PHYSICIAN QUERY
PT Name: Kamar Muñoz  MR #: [id number]    DOCUMENTATION CLARIFICATION      CDS/: Aric Lynn RN, CCDS      Contact information:     [email or web address]        This form is a permanent document in the medical record.     Query Date: April 8, 2022    By submitting this query, we are merely seeking further clarification of documentation. Please utilize your independent clinical judgment when addressing the question(s) below.    The Medical Record contains the following:   Indicators   Supporting Clinical Findings Location in Medical Record   x Hypertension associated with diabetes documented 4/5 H&P, HM: Hypertension associated with diabetes  Normotensive on admission.   - continue home dose metoprolol succinate 50 daily and losartan 25 daily   Hospital medcine (HM)    x Chronic condition(s) Diabetic ketoacidosis without coma associated with type 2 diabetes mellitus  --Coronary artery disease involving native coronary artery of native heart with unstable angina pectoris   --History of ST elevation myocardial infarction    --Stented coronary artery     4/5 H&P; HM   x Vital signs BP: (116-140)/(58-80) 116/58;  Pulse:  [68-72] 71    BP: (116-150)/(58-71) 150/71;  Pulse:  [57-72] 63    BP: (135-158)/(67-81) 135/73; Pulse:  [58-74] 70 4/5 H&P, HM    4/6 HM, PN    4/7 HM, PN      Treatment/Medication      Other       Provider, please clarify the relationship between the Hypertension and Diabetes Mellitus    [   ] Hypertension is a manifestation of Diabetes Mellitus (Secondary Hypertension)     [   x]  Hypertension is not a manifestation of Diabetes Mellitus (Essential Hypertension)     [   ] Other Clarification (please specify): ____________     [  ] Clinically Undetermined       Please document in your progress notes daily for the duration of treatment, until resolved, and include in your discharge summary.

## 2022-04-08 NOTE — PLAN OF CARE
SW contacted patient's PCP clinic ([facility]). Scheduled patient for first available hospital follow up. In AVS.    Leslie Aguilera LMSW  [phone]

## 2022-04-08 NOTE — PLAN OF CARE
04/08/22 0728   Post-Acute Status   Post-Acute Authorization Home Health   Home Health Status Referrals Sent   Coverage [employer] HMO   Discharge Delays None known at this time   Discharge Plan   Discharge Plan A Home Health   Discharge Plan B [facility] reported that they could not resume services with patient. Forwarded referrals to [facility] and [facility].    Leslie Aguilera, [facility] [place] [facility]  [phone]

## 2022-04-08 NOTE — PLAN OF CARE
04/08/22 1501   Post-Acute Status   Post-Acute Authorization Home Health   Home Health Status Set-up Complete/Auth obtained   Discharge Delays None known at this time   Discharge Plan   Discharge Plan A Home Health   Discharge Plan B Home Health     [facility] is providing HH services. They have the patient on their schedule for Monday.    Met with patient at bedside and advised him that [facility] will be his new HH agency. He was agreeable.    Leslie Aguilera, [facility]  [phone]

## 2022-04-08 NOTE — PT/OT/SLP PROGRESS
"Occupational Therapy   Treatment    Name: Kamar Muñoz  MRN: [id number]  Admitting Diagnosis:  Diabetic ketoacidosis without coma associated with type 2 diabetes mellitus       Recommendations:     Discharge Recommendations: home health OT  Discharge Equipment Recommendations:  none  Barriers to discharge:  None    Assessment:     Kamar Muñoz is a 76 y.o. male with a medical diagnosis of Diabetic ketoacidosis without coma associated with type 2 diabetes mellitus.  Pt had good tolerance of session, ambulating a functional household distance with SBA with RW and chair follow. He declined to perform ADLs.   He presents with the following . Performance deficits affecting function are weakness, impaired endurance, impaired self care skills, impaired functional mobilty, gait instability, pain, impaired skin.     Rehab Prognosis:  Good; patient would benefit from acute skilled OT services to address these deficits and reach maximum level of function.       Plan:     Patient to be seen 3 x/week to address the above listed problems via self-care/home management, therapeutic activities, therapeutic exercises  · Plan of Care Expires: 05/06/22  · Plan of Care Reviewed with: patient    Subjective   "You should have been here 3 days ago."  "Thank you."  Pain/Comfort:  · Pain Rating 1: other (see comments) (not rated)  · Location - Orientation 1: lower  · Location 1: back ("from a wound")  · Pain Addressed 1: Reposition, Distraction, Nurse notified  · Pain Rating Post-Intervention 1: other (see comments) (not rated)    Objective:     Communicated with: nursing prior to session.  Patient found left sidelying with peripheral IV, lopez catheter (IV not connected and running) with nursing present upon OT entry to room.    General Precautions: Standard, fall, seizure   Orthopedic Precautions:N/A   Braces: N/A  Respiratory Status: Room air     Occupational Performance:     Bed Mobility:    · Patient completed Scooting/Bridging " with stand by assistance  · Patient completed Supine to Sit to the L with stand by assistance  · Patient completed Sit to Supine with stand by assistance     Functional Mobility/Transfers:  · Patient completed Sit <> Stand Transfer from EOB x 1 trial with stand by assistance  with  rolling walker   · Functional Mobility: Pt ambulated a functional household distance in his room and in the hallway with SBA with RW and chair follow.  He had no LOB or complaints of dizziness.      Activities of Daily Living:  · Upper Body Dressing: minimum assistance to pema/doff back gown as a robe while sitting EOB      [facility] 6 Click ADL: 18    Treatment & Education:  Pt edu on role of OT, POC, benefit of performing OOB activity, and safety when performing functional transfers and mobility.    - Self care tasks completed-- as noted above       Patient left left sidelying with all lines intact, call button in reach and nursing notifiedEducation:      GOALS:   Multidisciplinary Problems     Occupational Therapy Goals        Problem: Occupational Therapy    Goal Priority Disciplines Outcome Interventions   Occupational Therapy Goal     OT, PT/OT Ongoing, Progressing    Description: Goals to be met by: 4/20/2022     Patient will increase functional independence with ADLs by performing:    LE Dressing with Supervision.  Grooming while standing with Supervision.  Toileting from toilet with Supervision for hygiene and clothing management.   Toilet transfer to toilet with Supervision.                     Time Tracking:     OT Date of Treatment: 04/08/22  OT Start Time: 1501  OT Stop Time: 1519  OT Total Time (min): 18 min    Billable Minutes:Therapeutic Activity 18 min    OT/JUAN: OT     JUAN Visit Number: 0    4/8/2022

## 2022-04-08 NOTE — ASSESSMENT & PLAN NOTE
IDDM2; last A1C 9.7.   home regimen: metformin 1000 BID, aspart 6 TID, glargine 12 - patient states taking 20 and 9 units TIDWM  Currently poorly controlled    Lab Results   Component Value Date    HGBA1C 9.7 (H) 04/05/2022     - detemir 8, aspart 12 TIDWM, MDSSI  - eats small breakfast, and bigger lunch/dinner. May need different doses for premeal insulin by meal.   - patient would benefit from outpt dexcom  - endocrinology consulted 4/8 to assist with management of hyperglycemia/labile sugars. Appreciate assistance.

## 2022-04-08 NOTE — NURSING
Patient is alert and oriented. Able to make needs known. Medications taken as ordered. Blood sugar monitoring continues. Critical BS reported to MD. New orders given. Monitoring continues. Incontinent care provided. OOB to chair for meals. All needs met by staff. Comfort and safety measures in place.

## 2022-04-08 NOTE — PHYSICIAN QUERY
PT Name: Kamar Muñoz  MR #: [id number]     DOCUMENTATION CLARIFICATION     CDS/: Aric Lynn RN, CCDS        Contact information:   [email or web address]    This form is a permanent document in the medical record.    Query Date: April 8, 2022    By submitting this query, we are merely seeking further clarification of documentation.  Please utilize your independent clinical judgment when addressing the question(s) below.    The Medical Record contains the following:   Indicator Supporting Clinical Findings Location in Medical Record    Kidney (Renal) Insufficiency      Kidney (Renal) Failure/Injury      Nephrotoxic Agents     x BUN/Creatinine           GFR 4/5  Cr: 1.4, 1.2  4/6  Cr: 1.1, 1.0,  1.1  4/7   Cr: 0.8, 1.2  4/8   Cr:  0.8   Labs     Urine: Casts         Eosinophils      Dehydration     x Nausea/Vomiting Gastrointestinal:  Positive for diarrhea and nausea   4/5 H&P, [facility] medicine    Dialysis/CRRT     x Treatment Na Cl, 1000 ml; 4/5 (total 2L)   MAR    x Other Principal Problem:Diabetic ketoacidosis without coma associated with type 2 diabetes mellitus   4/5 H&P, Hospital medicine [facility] approved diagnostic criteria for acute kidney injury is based on KDIGO criteria:    An increase in serum creatinine > 0.3mg/dl within 48 hours  OR  Increase in serum creatinine to > 1.5x baseline, which is known or presumed to have occurred within the prior 7 days  OR  Urine volume <0.5 ml/kg/hr for 6 hours       The clinical guidelines noted above are only a system guideline. It does not replace the providers clinical judgment.     Provider, please determine whether an acute renal condition is associated with above clinical findings.     [    ] Unspecified Acute Kidney Failure/Injury     [    x] Other Acute Kidney Failure/Injury (please specify): __prerenal__________     [    ] Other (please specify): _______________________________   [  ] Clinically Undetermined     Please  document in your progress notes daily for the duration of treatment until resolved and include in your discharge summary.    References:   [facility] Clinical Practice Guideline for Acute Kidney Injury. (2012, March). Retrieved October 21, 2020, from https://kdigo.org/wp-content/uploads/2016/10/OTVIH-5359-FDE-Guideline-English.pdf    DILLON Pascual MD, FACP. (2015, Maria L 15). Acute kidney injury revisited. Retrieved October 21, 2020, from [email or web address]/[email or web address]/coding-acute-kidney-injury.htm    Form No. 33110

## 2022-04-08 NOTE — PROGRESS NOTES
[facility] ([address] Medicine  Progress Note    Patient Name: Kamar Muñoz  MRN: [id number]  Patient Class: IP- Inpatient   Admission Date: 4/5/2022  Length of Stay: 3 days  Attending Physician: Renée Frye MD  Primary Care Provider: Basim Guerrero MD        Subjective:     Principal Problem:Diabetic ketoacidosis without coma associated with type 2 diabetes mellitus        HPI:  Mr. 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 He reports that his home BG runs in 300 and 500s. EMS found his with BG unreadable. Patient states that he took his insulin last night. He took 9 units novolog last night. As of this morning, he has not eaten and did not take his morning insulin. He reports nausea and weakness. Denies chest pain, SOB, palpitations, fever, chills, abdominal pain, constipation. Reports some diarrhea that started in the SNF.     He reports eating toast, cereal, PB&J sandwiches, uses sweet and low for coffee. Denies consuming excessively starchy or sugary foods.     The patient was recently discharged from SNF after being transferred there following his hospitalization for Covid with respiratory failure, DKA, metabolic encephalopathy, requiring ICU admission. He was admitted for weakness/debility and continued insulin adjustments. Admission to SNF was for secondary weakness debility, continued insulin adjustments. BG had been labile during his SNF stay and difficult to control with hypoglycemic events as well. Patient was discharged with insulin aspart 6 TID and glargine 12 units daily with sliding scale.     In the ED, significant labs include BG of 626, bicarb 14, BNB 5.9, UA with 3+ ketones, 3+ glucose, occult blood. VBG showed mild acidosis with pH of 7.3. He  received insulin regular bolus, zofran, NS 1 L x2, and was started on insulin drip.                  Overview/Hospital Course:  Admitted to hospital medicine for DKA with glucose in the 600s and anion gap of 24. No infectious symptoms identified and cardiac eval with ekg/trop were negative. He was started on an insulin gtt and transitioned off the drip within 12 hours of admission. Hospital course c/b labile sugars off the insulin gtt with fasting sugars showing hypoglycemia and then prandial sugars into the 500s requiring additional units of aspart. Upon chart review, appears patient has issues with fasting sugars with frequent hypoglycemic episodes. Endocrinology consulted for help with management of labile blood sugars.      Interval History: Yesterday patient required around 67 units of aspart for sugars >500. This AM, hypoglycemic and morning aspart held. He reports polyuria and polydipsia. Hemodynamically stable.    Review of Systems   Constitutional:  Negative for chills and fever.   HENT: Negative.     Eyes: Negative.    Respiratory:  Negative for cough and shortness of breath.    Cardiovascular:  Negative for chest pain, palpitations and leg swelling.   Gastrointestinal:  Negative for abdominal pain, constipation, diarrhea, nausea and vomiting.   Endocrine: Positive for polyuria.   Genitourinary:  Negative for dysuria and hematuria.   Musculoskeletal:  Positive for back pain (sacral). Negative for arthralgias.   Skin:  Positive for wound.   Neurological:  Positive for weakness. Negative for dizziness and headaches.   Hematological: Negative.    Psychiatric/Behavioral: Negative.     Objective:     Vital Signs (Most Recent):  Temp: 97.4 °F (36.3 °C) (04/08/22 1157)  Pulse: 61 (04/08/22 1157)  Resp: 18 (04/08/22 1157)  BP: 119/70 (04/08/22 1157)  SpO2: 97 % (04/08/22 1157) Vital Signs (24h Range):  Temp:  [97.4 °F (36.3 °C)-98.3 °F (36.8 °C)] 97.4 °F (36.3 °C)  Pulse:  [57-77] 61  Resp:  [18-20] 18  SpO2:  [94  %-99 %] 97 %  BP: (118-151)/(60-80) 119/70     Weight: 75 kg (165 lb 5.5 oz)  Body mass index is 21.23 kg/m².    Intake/Output Summary (Last 24 hours) at 4/8/2022 1410  Last data filed at 4/8/2022 1200  Gross per 24 hour   Intake 550 ml   Output 825 ml   Net -275 ml      Physical Exam  Constitutional:       Appearance: Normal appearance.   HENT:      Head: Normocephalic and atraumatic.      Nose: Nose normal.      Mouth/Throat:      Mouth: Mucous membranes are dry.   Eyes:      General: No scleral icterus.  Cardiovascular:      Rate and Rhythm: Normal rate and regular rhythm.      Pulses: Normal pulses.      Heart sounds: Normal heart sounds. No murmur heard.  Pulmonary:      Effort: Pulmonary effort is normal. No respiratory distress.      Breath sounds: Normal breath sounds. No wheezing or rales.   Abdominal:      General: Bowel sounds are normal. There is no distension.      Palpations: Abdomen is soft.   Musculoskeletal:         General: Normal range of motion.      Cervical back: Normal range of motion. No rigidity.      Right lower leg: No edema.      Left lower leg: No edema.   Skin:     General: Skin is dry.          Neurological:      General: No focal deficit present.      Mental Status: He is alert and oriented to person, place, and time.      Motor: No weakness.   Psychiatric:         Mood and Affect: Mood normal.         Behavior: Behavior normal.       Significant Labs: All pertinent labs within the past 24 hours have been reviewed.    Significant Imaging: I have reviewed all pertinent imaging results/findings within the past 24 hours.      Assessment/Plan:      * Diabetic ketoacidosis without coma associated with type 2 diabetes mellitus  DKA likely 2/2 to inadequate insulin regimen/diet noncompliance/difficulty managing meds at home given that his wife is also ill. Poorly controlled at SNF prior to DC, and likely continuation of that. No evidence of UTI or pneumonia at this time. Does not meet  SIRdS/sepsis criteria. Possible source may be sacral injury, but does not appear infected at this time. Insulin drip and IVF on 4/5-4/6. Elevated BG at 500s again on 4/7    -- poct glucose ACHS and 2am  -- HbA1C 9.7  -- diabetic diet and boost glucose control  -- AG closed, bicarb wnl as of 4/6        Type 2 diabetes mellitus with hyperglycemia, with long-term current use of insulin  IDDM2; last A1C 9.7.   home regimen: metformin 1000 BID, aspart 6 TID, glargine 12 - patient states taking 20 and 9 units TIDWM  Currently poorly controlled    Lab Results   Component Value Date    HGBA1C 9.7 (H) 04/05/2022     - detemir 8, aspart 12 TIDWM, MDSSI  - eats small breakfast, and bigger lunch/dinner. May need different doses for premeal insulin by meal.   - patient would benefit from outpt dexcom  - endocrinology consulted 4/8 to assist with management of hyperglycemia/labile sugars. Appreciate assistance.      Severe malnutrition    Boost glucose control TID    Chronic anticoagulation  Chronically on Eliquis      Focal seizures  Continue home lacosamide      History of ST elevation myocardial infarction (STEMI)  Hx of RCA STEMI in [facility] s/p LAUREN placement on 1/9/22. Was on ASA/Plavix at home    - Will continue plavix only given that he is on apixaban since it's been greater than 1 month since LAUREN placement  - No need for triple therapy (ASA/Plavix/eliquis) beyond 1 month      Stented coronary artery  CAD s/p STEMI and RCA LAUREN placed 1/9/22      Paroxysmal atrial fibrillation  Patient with Paroxysmal (<7 days) atrial fibrillation which is controlled currently with Amiodarone. Patient is currently in sinus rhythm.[id number] Score: 4. Anticoagulation indicated. Anticoagulation done with eliquis.        History of embolic stroke  Hx of stroke in Jan 2022.     - continue home lipitor  - PT/OT for residual weakness/debilty     Coronary artery disease involving native coronary artery of native heart with unstable angina pectoris  CAD  s/p LAUREN to RCA after STEMI in Jan 2022.     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     - continue home dose metoprolol succinate 50 daily and losartan 25 daily        VTE Risk Mitigation (From admission, onward)         Ordered     apixaban tablet 5 mg  2 times daily         04/05/22 1601     IP VTE HIGH RISK PATIENT  Once         04/05/22 1553     Place sequential compression device  Until discontinued         04/05/22 1547                Discharge Planning   ALLIE: 4/10/2022     Code Status: Full Code   Is the patient medically ready for discharge?: No    Reason for patient still in hospital (select all that apply): Treatment  Discharge Plan A: Home Health   Discharge Delays: None known at this time              Stefanie García DO  Department of Hospital Medicine   [facility] ([facility]

## 2022-04-08 NOTE — ASSESSMENT & PLAN NOTE
Patient with Paroxysmal (<7 days) atrial fibrillation which is controlled currently with Amiodarone. Patient is currently in sinus rhythm.[id number] Score: 4. Anticoagulation indicated. Anticoagulation done with eliquis.

## 2022-04-08 NOTE — PLAN OF CARE
04/08/22 0926   Post-Acute Status   Post-Acute Authorization Home Health   Home Health Status Pending Payor Review   Coverage [employer]   Discharge Delays None known at this time   Discharge Plan   Discharge Plan A Home Health   Discharge Plan B Home Health     [facility]  has accepted the patient pending insurance auth. They have put him on the schedule for Monday. Contacted [facility] to ensure they discharged patient so that new auth can be obtained. They reported that they discharged him once he admitted to hospital.    Leslie Aguilera, [facility] [place]  [phone]

## 2022-04-09 LAB
ALBUMIN SERPL BCP-MCNC: 2.4 G/DL (ref 3.5–5.2)
ALP SERPL-CCNC: 113 U/L (ref 55–135)
ALT SERPL W/O P-5'-P-CCNC: 19 U/L (ref 10–44)
ANION GAP SERPL CALC-SCNC: 10 MMOL/L (ref 8–16)
AST SERPL-CCNC: 28 U/L (ref 10–40)
BASOPHILS # BLD AUTO: 0.03 K/UL (ref 0–0.2)
BASOPHILS NFR BLD: 0.4 % (ref 0–1.9)
BILIRUB SERPL-MCNC: 0.3 MG/DL (ref 0.1–1)
BUN SERPL-MCNC: 19 MG/DL (ref 8–23)
CALCIUM SERPL-MCNC: 8.1 MG/DL (ref 8.7–10.5)
CHLORIDE SERPL-SCNC: 104 MMOL/L (ref 95–110)
CO2 SERPL-SCNC: 24 MMOL/L (ref 23–29)
CREAT SERPL-MCNC: 0.8 MG/DL (ref 0.5–1.4)
DIFFERENTIAL METHOD: ABNORMAL
EOSINOPHIL # BLD AUTO: 0.6 K/UL (ref 0–0.5)
EOSINOPHIL NFR BLD: 8.4 % (ref 0–8)
ERYTHROCYTE [DISTWIDTH] IN BLOOD BY AUTOMATED COUNT: 16.7 % (ref 11.5–14.5)
EST. GFR  (AFRICAN AMERICAN): >60 ML/MIN/1.73 M^2
EST. GFR  (NON AFRICAN AMERICAN): >60 ML/MIN/1.73 M^2
GLUCOSE SERPL-MCNC: 476 MG/DL (ref 70–110)
GLUCOSE SERPL-MCNC: 61 MG/DL (ref 70–110)
HCT VFR BLD AUTO: 39.5 % (ref 40–54)
HGB BLD-MCNC: 12.4 G/DL (ref 14–18)
IMM GRANULOCYTES # BLD AUTO: 0.02 K/UL (ref 0–0.04)
IMM GRANULOCYTES NFR BLD AUTO: 0.3 % (ref 0–0.5)
LYMPHOCYTES # BLD AUTO: 2.2 K/UL (ref 1–4.8)
LYMPHOCYTES NFR BLD: 30.8 % (ref 18–48)
MAGNESIUM SERPL-MCNC: 1.6 MG/DL (ref 1.6–2.6)
MCH RBC QN AUTO: 29 PG (ref 27–31)
MCHC RBC AUTO-ENTMCNC: 31.4 G/DL (ref 32–36)
MCV RBC AUTO: 93 FL (ref 82–98)
MONOCYTES # BLD AUTO: 0.6 K/UL (ref 0.3–1)
MONOCYTES NFR BLD: 8.7 % (ref 4–15)
NEUTROPHILS # BLD AUTO: 3.7 K/UL (ref 1.8–7.7)
NEUTROPHILS NFR BLD: 51.4 % (ref 38–73)
NRBC BLD-RTO: 0 /100 WBC
PHOSPHATE SERPL-MCNC: 3.7 MG/DL (ref 2.7–4.5)
PLATELET # BLD AUTO: 225 K/UL (ref 150–450)
PMV BLD AUTO: 10.4 FL (ref 9.2–12.9)
POCT GLUCOSE: 104 MG/DL (ref 70–110)
POCT GLUCOSE: 382 MG/DL (ref 70–110)
POCT GLUCOSE: 393 MG/DL (ref 70–110)
POCT GLUCOSE: 421 MG/DL (ref 70–110)
POCT GLUCOSE: 464 MG/DL (ref 70–110)
POCT GLUCOSE: 488 MG/DL (ref 70–110)
POCT GLUCOSE: 68 MG/DL (ref 70–110)
POCT GLUCOSE: >500 MG/DL (ref 70–110)
POCT GLUCOSE: >500 MG/DL (ref 70–110)
POTASSIUM SERPL-SCNC: 3.9 MMOL/L (ref 3.5–5.1)
PROT SERPL-MCNC: 5.8 G/DL (ref 6–8.4)
RBC # BLD AUTO: 4.27 M/UL (ref 4.6–6.2)
SODIUM SERPL-SCNC: 138 MMOL/L (ref 136–145)
WBC # BLD AUTO: 7.11 K/UL (ref 3.9–12.7)

## 2022-04-09 PROCEDURE — 99232 PR SUBSEQUENT HOSPITAL CARE,LEVL II: ICD-10-PCS | Mod: GC,,, | Performed by: HOSPITALIST

## 2022-04-09 PROCEDURE — 25000003 PHARM REV CODE 250

## 2022-04-09 PROCEDURE — 84100 ASSAY OF PHOSPHORUS: CPT

## 2022-04-09 PROCEDURE — 94761 N-INVAS EAR/PLS OXIMETRY MLT: CPT

## 2022-04-09 PROCEDURE — 63600175 PHARM REV CODE 636 W HCPCS

## 2022-04-09 PROCEDURE — 25000003 PHARM REV CODE 250: Performed by: INTERNAL MEDICINE

## 2022-04-09 PROCEDURE — 99223 1ST HOSP IP/OBS HIGH 75: CPT | Mod: GC,,, | Performed by: INTERNAL MEDICINE

## 2022-04-09 PROCEDURE — 82947 ASSAY GLUCOSE BLOOD QUANT: CPT

## 2022-04-09 PROCEDURE — 20600001 HC STEP DOWN PRIVATE ROOM

## 2022-04-09 PROCEDURE — 99223 PR INITIAL HOSPITAL CARE,LEVL III: ICD-10-PCS | Mod: GC,,, | Performed by: INTERNAL MEDICINE

## 2022-04-09 PROCEDURE — 83735 ASSAY OF MAGNESIUM: CPT

## 2022-04-09 PROCEDURE — 85025 COMPLETE CBC W/AUTO DIFF WBC: CPT

## 2022-04-09 PROCEDURE — 99232 SBSQ HOSP IP/OBS MODERATE 35: CPT | Mod: GC,,, | Performed by: HOSPITALIST

## 2022-04-09 PROCEDURE — 63600175 PHARM REV CODE 636 W HCPCS: Performed by: GENERAL ACUTE CARE HOSPITAL

## 2022-04-09 PROCEDURE — 36415 COLL VENOUS BLD VENIPUNCTURE: CPT

## 2022-04-09 PROCEDURE — 80053 COMPREHEN METABOLIC PANEL: CPT | Performed by: STUDENT IN AN ORGANIZED HEALTH CARE EDUCATION/TRAINING PROGRAM

## 2022-04-09 RX ORDER — DICLOFENAC SODIUM 10 MG/G
4 GEL TOPICAL 3 TIMES DAILY
Status: DISCONTINUED | OUTPATIENT
Start: 2022-04-09 | End: 2022-04-09

## 2022-04-09 RX ORDER — IBUPROFEN 200 MG
24 TABLET ORAL
Status: DISCONTINUED | OUTPATIENT
Start: 2022-04-09 | End: 2022-04-10 | Stop reason: HOSPADM

## 2022-04-09 RX ORDER — INSULIN ASPART 100 [IU]/ML
8 INJECTION, SOLUTION INTRAVENOUS; SUBCUTANEOUS ONCE
Status: COMPLETED | OUTPATIENT
Start: 2022-04-09 | End: 2022-04-09

## 2022-04-09 RX ORDER — MAGNESIUM SULFATE HEPTAHYDRATE 40 MG/ML
2 INJECTION, SOLUTION INTRAVENOUS ONCE
Status: COMPLETED | OUTPATIENT
Start: 2022-04-09 | End: 2022-04-09

## 2022-04-09 RX ORDER — INSULIN ASPART 100 [IU]/ML
8 INJECTION, SOLUTION INTRAVENOUS; SUBCUTANEOUS
Status: DISCONTINUED | OUTPATIENT
Start: 2022-04-09 | End: 2022-04-10 | Stop reason: HOSPADM

## 2022-04-09 RX ORDER — IBUPROFEN 200 MG
16 TABLET ORAL
Status: DISCONTINUED | OUTPATIENT
Start: 2022-04-09 | End: 2022-04-10 | Stop reason: HOSPADM

## 2022-04-09 RX ORDER — GLUCAGON 1 MG
1 KIT INJECTION
Status: DISCONTINUED | OUTPATIENT
Start: 2022-04-09 | End: 2022-04-10 | Stop reason: HOSPADM

## 2022-04-09 RX ORDER — OXYCODONE HYDROCHLORIDE 5 MG/1
5 TABLET ORAL ONCE
Status: COMPLETED | OUTPATIENT
Start: 2022-04-09 | End: 2022-04-09

## 2022-04-09 RX ORDER — INSULIN ASPART 100 [IU]/ML
0-5 INJECTION, SOLUTION INTRAVENOUS; SUBCUTANEOUS
Status: DISCONTINUED | OUTPATIENT
Start: 2022-04-09 | End: 2022-04-10 | Stop reason: HOSPADM

## 2022-04-09 RX ADMIN — APIXABAN 5 MG: 5 TABLET, FILM COATED ORAL at 08:04

## 2022-04-09 RX ADMIN — INSULIN ASPART 8 UNITS: 100 INJECTION, SOLUTION INTRAVENOUS; SUBCUTANEOUS at 04:04

## 2022-04-09 RX ADMIN — INSULIN ASPART 8 UNITS: 100 INJECTION, SOLUTION INTRAVENOUS; SUBCUTANEOUS at 06:04

## 2022-04-09 RX ADMIN — APIXABAN 5 MG: 5 TABLET, FILM COATED ORAL at 09:04

## 2022-04-09 RX ADMIN — LACOSAMIDE 100 MG: 100 TABLET, FILM COATED ORAL at 09:04

## 2022-04-09 RX ADMIN — GABAPENTIN 200 MG: 100 CAPSULE ORAL at 08:04

## 2022-04-09 RX ADMIN — AMIODARONE HYDROCHLORIDE 200 MG: 200 TABLET ORAL at 09:04

## 2022-04-09 RX ADMIN — METOPROLOL SUCCINATE 50 MG: 50 TABLET, EXTENDED RELEASE ORAL at 09:04

## 2022-04-09 RX ADMIN — MUPIROCIN: 20 OINTMENT TOPICAL at 09:04

## 2022-04-09 RX ADMIN — GABAPENTIN 200 MG: 100 CAPSULE ORAL at 03:04

## 2022-04-09 RX ADMIN — INSULIN ASPART 5 UNITS: 100 INJECTION, SOLUTION INTRAVENOUS; SUBCUTANEOUS at 06:04

## 2022-04-09 RX ADMIN — OXYCODONE 5 MG: 5 TABLET ORAL at 09:04

## 2022-04-09 RX ADMIN — PANTOPRAZOLE SODIUM 40 MG: 40 TABLET, DELAYED RELEASE ORAL at 09:04

## 2022-04-09 RX ADMIN — INSULIN ASPART 3 UNITS: 100 INJECTION, SOLUTION INTRAVENOUS; SUBCUTANEOUS at 11:04

## 2022-04-09 RX ADMIN — INSULIN ASPART 5 UNITS: 100 INJECTION, SOLUTION INTRAVENOUS; SUBCUTANEOUS at 01:04

## 2022-04-09 RX ADMIN — LOSARTAN POTASSIUM 25 MG: 25 TABLET, FILM COATED ORAL at 09:04

## 2022-04-09 RX ADMIN — INSULIN ASPART 3 UNITS: 100 INJECTION, SOLUTION INTRAVENOUS; SUBCUTANEOUS at 08:04

## 2022-04-09 RX ADMIN — INSULIN ASPART 6 UNITS: 100 INJECTION, SOLUTION INTRAVENOUS; SUBCUTANEOUS at 09:04

## 2022-04-09 RX ADMIN — ATORVASTATIN CALCIUM 40 MG: 20 TABLET, FILM COATED ORAL at 09:04

## 2022-04-09 RX ADMIN — CETIRIZINE HYDROCHLORIDE 10 MG: 10 TABLET, FILM COATED ORAL at 08:04

## 2022-04-09 RX ADMIN — CLOPIDOGREL 75 MG: 75 TABLET, FILM COATED ORAL at 09:04

## 2022-04-09 RX ADMIN — INSULIN ASPART 6 UNITS: 100 INJECTION, SOLUTION INTRAVENOUS; SUBCUTANEOUS at 01:04

## 2022-04-09 RX ADMIN — MAGNESIUM SULFATE 2 G: 2 INJECTION INTRAVENOUS at 09:04

## 2022-04-09 RX ADMIN — LACOSAMIDE 100 MG: 100 TABLET, FILM COATED ORAL at 08:04

## 2022-04-09 RX ADMIN — GABAPENTIN 200 MG: 100 CAPSULE ORAL at 09:04

## 2022-04-09 NOTE — CARE UPDATE
Spoke to patient's son Kamar Jr. Toni To update about his father's progress. Explained that dietary noncompliance inpatient likely playing a role in elevated BG despite insulin regimen. Explained the Endocrinology is following now, and that they are advising on insulin regimen and recommend CGM and oupt. Follow up. Son inquiring about best dietary options given labile BG. All questions and concerns answered.

## 2022-04-09 NOTE — ASSESSMENT & PLAN NOTE
IDDM2; last A1C 9.7.   home regimen: metformin 1000 BID, aspart 6 TID, glargine 12 - patient states taking 20 and 9 units TIDWM  Currently poorly controlled    Lab Results   Component Value Date    HGBA1C 9.7 (H) 04/05/2022     - detemir 6, aspart 8 TIDWM, LDSSI  - eats small breakfast, and bigger lunch/dinner. May need different doses for premeal insulin by meal.   - patient would benefit from outpt dexcom per endo  - endocrinology consulted 4/8 to assist with management of hyperglycemia/labile sugars. Appreciate assistance.

## 2022-04-09 NOTE — HPI
Reason for Consult: Management of T2DM, Hyperglycemia     Surgical Procedure and Date: NA    Diabetes diagnosis year: >20 years    Home Diabetes Medications:  During recent SNF was on Aspart 6 TID and glargine 12 units daily with sliding scale. Patient reports he was taking at home 20 of lantus at night with 14 units of novolog before meals with moderate correction.     How often checking glucose at home? >4 x day   BG readings on regimen: in am average of 40, several in the 40's and some in the 30's (feels it when in the 30's - weak, shaky an sweaty), does not get low during the day but reports he eats snacks between his meals as he gets hungry. Does not wake up in middle of the night due to low BG, as he does not feel them.     Hypoglycemia on the regimen?  Yes  Missed doses on regimen?  No    Diabetes Complications include:     Hyperglycemia, Hypoglycemia , and Diabetic peripheral neuropathy     Complicating diabetes co morbidities:   History of CVA      HPI:   Patient is a 76 y.o. 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     He reports that his home BG runs in 300 and 500s sometimes during the day. EMS found his with BG unreadable. Patient states that he took his insulin the night prior to admission (9 units of Novolog). During his presentation reported he did not take his morning insulin.    Reported nausea and weakness. Reports some diarrhea that started in the SNF.     Denied chest pain, SOB, palpitations, fever, chills, abdominal pain, constipation.    He reports eating toast, cereal, PB&J sandwiches, uses sweet and low for coffee. Denies consuming excessively starchy or sugary foods.      The patient was recently discharged from SNF after being transferred there following his hospitalization for Covid with  respiratory failure, DKA, metabolic encephalopathy, requiring ICU admission. He was admitted for weakness/debility and continued insulin adjustments. Admission to SNF was for secondary weakness debility, continued insulin adjustments. BG had been labile during his SNF stay and difficult to control with hypoglycemic events as well. Patient was discharged with insulin aspart 6 TID and glargine 12 units daily with sliding scale.     In the ED, significant labs include BG of 626, bicarb 14, GAP 24, BNB 5.9, UA with 3+ ketones, 3+ glucose, occult blood. VBG showed mild acidosis with pH of 7.3. He received insulin regular bolus, zofran, NS 1 L x2, and was started on insulin drip and transitioned off the drip within 12 hours of admission. Hospital course c/b labile sugars in the 500's. Upon chart review he has Hx with fasting sugars with frequent hypoglycemia episodes.    Endocrine consulted for management of labile blood glucose.

## 2022-04-09 NOTE — ASSESSMENT & PLAN NOTE
Recommend relaxed BG parameters  Goal A1c 7.5 to 8 --> goal to prevent low BG levels  BG goal 160-210 while inpatient    Pt will benefit from CGM  Recommend endocrine follow up after discharge

## 2022-04-09 NOTE — ASSESSMENT & PLAN NOTE
Pt with Hx of DM 2  Lab Results   Component Value Date    HGBA1C 9.7 (H) 04/05/2022     Pt noted to receive 67 units of insulin yesterday as his BG were elevated   Low BG in am today likely due to long acting regimen too strong     Noted that pt has received some meals without pre-meal insulin leading to glycemic excursions which later require multiple corrections as pt was fed even though sugars >400    Today in am due to low BG after it was treated nurse held his breakfast insulin leading to the hyperglycemia episode in the afternoon  Due to his age recommend A1c goal of 7.5 to 8 and ok for BG to run in the higher side 160-210 due to his hypoglycemia unawareness  Pt will benefit from a CGM     Plan  Recommend levemir 10 units   Recommend aspart 6 units before meal with low dose corrections  POCT before meals, bedtime and 2am   Goal A1c 7.5 to 8    PLEASE IF PT BECOMES HYPOGLYCEMIC TREAT THE LOW BG AND ONCE BG NORMALIZE >90 AND TIME FOR FOOD DO NOT HOLD PRE-MEAL INSULIN UNLESS IT HAS BEEN DISCUSSED WITH ENDOCRINE    IF BG >300 DO NOT FEED PATIENT, HOLD HIS MEAL GIVE CORRECTION AND GIVE MEAL ONCE BG CLOSER

## 2022-04-09 NOTE — ASSESSMENT & PLAN NOTE
Patient with acute kidney injury likely d/t Pre-renal azotemia Which is currently improving. Labs reviewed- Renal function/electrolytes with Estimated Creatinine Clearance: 80.7 mL/min (based on SCr of 0.8 mg/dL). according to latest data. Monitor urine output and serial BMP and adjust therapy as needed. Avoid nephrotoxins and renally dose meds for GFR listed above.     - resolved.

## 2022-04-09 NOTE — ASSESSMENT & PLAN NOTE
Patient with Paroxysmal (<7 days) atrial fibrillation which is controlled currently with Amiodarone. Patient is currently in sinus rhythm.[id number] Score: 4. Anticoagulation indicated. Anticoagulation done with eliquis.

## 2022-04-09 NOTE — SUBJECTIVE & OBJECTIVE
Interval HPI:   Overnight events:  Pt with labile BG     Eatin%  Nausea: No  Hypoglycemia and intervention: Yes  Fever: No  TPN and/or TF: No  If yes, type of TF/TPN and rate: NA    PMH, PSH, FH, SH updated and reviewed     ROS:    Review of Systems   Constitutional:  Negative for activity change and unexpected weight change.   HENT:  Negative for sore throat and voice change.    Eyes:  Negative for visual disturbance.   Respiratory:  Negative for shortness of breath.    Cardiovascular:  Negative for chest pain.   Gastrointestinal:  Negative for abdominal pain, constipation, diarrhea, nausea and vomiting.   Genitourinary:  Negative for urgency.   Musculoskeletal:  Negative for arthralgias.   Skin:  Negative for wound.   Neurological:  Negative for headaches.   Psychiatric/Behavioral:  Negative for confusion and sleep disturbance.      Current Medications and/or Treatments Impacting Glycemic Control  Immunotherapy:    Immunosuppressants       None          Steroids:   Hormones (From admission, onward)                Start     Stop Route Frequency Ordered    22 1650  melatonin tablet 6 mg         -- Oral Nightly PRN 22 1553          Pressors:    Autonomic Drugs (From admission, onward)                None          Hyperglycemia/Diabetes Medications:   Antihyperglycemics (From admission, onward)                Start     Stop Route Frequency Ordered    22 2100  insulin detemir U-100 pen 10 Units         -- SubQ Nightly 22 0715  insulin aspart U-100 pen 6 Units         -- SubQ 3 times daily with meals 22 2254  insulin aspart U-100 pen 0-5 Units         -- SubQ Before meals & nightly PRN 22 21522 2152  insulin detemir U-100 pen 4 Units         -- SubQ Once 22 2153             PHYSICAL EXAMINATION:  Vitals:    22   BP: (!) 158/79   Pulse: 61   Resp: 19   Temp: 97.5 °F (36.4 °C)     Body mass index is 21.23  kg/m².    Physical Exam  Vitals and nursing note reviewed.   Constitutional:       General: He is not in acute distress.  HENT:      Head: Normocephalic and atraumatic.   Eyes:      General: No scleral icterus.     Conjunctiva/sclera: Conjunctivae normal.   Neck:      Trachea: No tracheal deviation.   Cardiovascular:      Rate and Rhythm: Normal rate.      Heart sounds: Normal heart sounds. No murmur heard.  Pulmonary:      Effort: Pulmonary effort is normal.      Breath sounds: Normal breath sounds.   Abdominal:      Palpations: Abdomen is soft. There is no mass.      Tenderness: There is no abdominal tenderness.      Comments: Insulin injection sites examined on abdomen, clean, no redness, no nodule/masses     Musculoskeletal:         General: Normal range of motion.      Cervical back: Normal range of motion and neck supple. No rigidity.   Lymphadenopathy:      Cervical: No cervical adenopathy.   Skin:     General: Skin is warm.      Findings: No rash.   Neurological:      Mental Status: He is alert and oriented to person, place, and time.   Psychiatric:         Judgment: Judgment normal.

## 2022-04-09 NOTE — ASSESSMENT & PLAN NOTE
Patient does not notice hypoglycemic episodes until severely low. Hypoglycemia does not wake him up

## 2022-04-09 NOTE — CARE UPDATE
Spoke to Patient nutrition about patient being deliver full sugar items with breakfast despite diabetic diet being ordered and listed on his tray. They said they will place a note about diabetic diet.

## 2022-04-09 NOTE — SUBJECTIVE & OBJECTIVE
Interval History: NAOE, BG on low end 90-100s and 2 am glucose of 68. Did have sugary syrup delivered at breakfast. Diet noncompliance due to full sugar items being delivered despite diabetic diet ordered. Pre lunch .     Review of Systems   Constitutional:  Negative for chills and fever.   HENT: Negative.     Eyes: Negative.    Respiratory:  Negative for cough and shortness of breath.    Cardiovascular:  Negative for chest pain, palpitations and leg swelling.   Gastrointestinal:  Negative for abdominal pain, constipation, diarrhea, nausea and vomiting.   Endocrine: Positive for polyuria.   Genitourinary:  Negative for dysuria and hematuria.   Musculoskeletal:  Positive for back pain (sacral). Negative for arthralgias.   Skin:  Positive for wound.   Neurological:  Positive for weakness. Negative for dizziness and headaches.   Hematological: Negative.    Psychiatric/Behavioral: Negative.     Objective:     Vital Signs (Most Recent):  Temp: 97.9 °F (36.6 °C) (04/09/22 1148)  Pulse: 66 (04/09/22 1148)  Resp: 20 (04/09/22 0710)  BP: (!) 140/75 (04/09/22 1148)  SpO2: 97 % (04/09/22 1148)   Vital Signs (24h Range):  Temp:  [96.7 °F (35.9 °C)-97.9 °F (36.6 °C)] 97.9 °F (36.6 °C)  Pulse:  [60-67] 66  Resp:  [18-20] 20  SpO2:  [95 %-99 %] 97 %  BP: (121-158)/(59-89) 140/75     Weight: 75 kg (165 lb 5.5 oz)  Body mass index is 21.23 kg/m².    Intake/Output Summary (Last 24 hours) at 4/9/2022 1443  Last data filed at 4/9/2022 1200  Gross per 24 hour   Intake 574 ml   Output 900 ml   Net -326 ml      Physical Exam  Constitutional:       Appearance: Normal appearance.   HENT:      Head: Normocephalic and atraumatic.      Nose: Nose normal.      Mouth/Throat:      Mouth: Mucous membranes are dry.   Eyes:      General: No scleral icterus.  Cardiovascular:      Rate and Rhythm: Normal rate and regular rhythm.      Pulses: Normal pulses.      Heart sounds: Normal heart sounds. No murmur heard.  Pulmonary:      Effort:  Pulmonary effort is normal. No respiratory distress.      Breath sounds: Normal breath sounds. No wheezing or rales.   Abdominal:      General: Bowel sounds are normal. There is no distension.      Palpations: Abdomen is soft.   Musculoskeletal:         General: Normal range of motion.      Cervical back: Normal range of motion. No rigidity.      Right lower leg: No edema.      Left lower leg: No edema.   Skin:     General: Skin is dry.          Neurological:      General: No focal deficit present.      Mental Status: He is alert and oriented to person, place, and time.      Motor: No weakness.   Psychiatric:         Mood and Affect: Mood normal.         Behavior: Behavior normal.       Significant Labs: All pertinent labs within the past 24 hours have been reviewed.  CBC:   Recent Labs   Lab 04/08/22  0523 04/09/22  0334   WBC 8.66 7.11   HGB 12.7* 12.4*   HCT 38.6* 39.5*    225     CMP:   Recent Labs   Lab 04/07/22  1515 04/08/22  0523 04/09/22  0333   * 139 138   K 5.3* 3.7 3.9    105 104   CO2 23 26 24   * 76 61*   BUN 19 17 19   CREATININE 1.2 0.8 0.8   CALCIUM 8.3* 8.5* 8.1*   PROT  --  6.5 5.8*   ALBUMIN  --  2.5* 2.4*   BILITOT  --  0.5 0.3   ALKPHOS  --  114 113   AST  --  30 28   ALT  --  19 19   ANIONGAP 9 8 10   EGFRNONAA 57.6* >60.0 >60.0     POCT Glucose:   Recent Labs   Lab 04/09/22  0206 04/09/22  0809 04/09/22  1144   POCTGLUCOSE 68* 104 464*       Significant Imaging: I have reviewed all pertinent imaging results/findings within the past 24 hours.

## 2022-04-09 NOTE — CONSULTS
[facility] - [facility] ([facility]-)  Endocrinology  Diabetes Consult Note    Consult Requested by: Renée Frye MD   Reason for admit: Diabetic ketoacidosis without coma associated with type 2 diabetes mellitus    HISTORY OF PRESENT ILLNESS:  Reason for Consult: Management of T2DM, Hyperglycemia     Surgical Procedure and Date: NA    Diabetes diagnosis year: >20 years    Home Diabetes Medications:  During recent SNF was on Aspart 6 TID and glargine 12 units daily with sliding scale. Patient reports he was taking at home 20 of lantus at night with 14 units of novolog before meals with moderate correction.     How often checking glucose at home? >4 x day   BG readings on regimen: in am average of 40, several in the 40's and some in the 30's (feels it when in the 30's - weak, shaky an sweaty), does not get low during the day but reports he eats snacks between his meals as he gets hungry. Does not wake up in middle of the night due to low BG, as he does not feel them.     Hypoglycemia on the regimen?  Yes  Missed doses on regimen?  No    Diabetes Complications include:     Hyperglycemia, Hypoglycemia , and Diabetic peripheral neuropathy     Complicating diabetes co morbidities:   History of CVA      HPI:   Patient is a 76 y.o. 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     He reports that his home BG runs in 300 and 500s sometimes during the day. EMS found his with BG unreadable. Patient states that he took his insulin the night prior to admission (9 units of Novolog). During his presentation reported he did not take his morning insulin.    Reported nausea and weakness. Reports some diarrhea that started in the SNF.     Denied chest pain, SOB, palpitations, fever, chills, abdominal pain, constipation.    He  reports eating toast, cereal, PB&J sandwiches, uses sweet and low for coffee. Denies consuming excessively starchy or sugary foods.      The patient was recently discharged from SNF after being transferred there following his hospitalization for Covid with respiratory failure, DKA, metabolic encephalopathy, requiring ICU admission. He was admitted for weakness/debility and continued insulin adjustments. Admission to SNF was for secondary weakness debility, continued insulin adjustments. BG had been labile during his SNF stay and difficult to control with hypoglycemic events as well. Patient was discharged with insulin aspart 6 TID and glargine 12 units daily with sliding scale.     In the ED, significant labs include BG of 626, bicarb 14, GAP 24, BNB 5.9, UA with 3+ ketones, 3+ glucose, occult blood. VBG showed mild acidosis with pH of 7.3. He received insulin regular bolus, zofran, NS 1 L x2, and was started on insulin drip and transitioned off the drip within 12 hours of admission. Hospital course c/b labile sugars in the 500's. Upon chart review he has Hx with fasting sugars with frequent hypoglycemia episodes.    Endocrine consulted for management of labile blood glucose.                   Interval HPI:   Overnight events:  Pt with labile BG   Had a hypoglycemic episode last night requiring some juice with improvement of his BG levels    Eatin%  Nausea: No  Hypoglycemia and intervention: Yes  Fever: No  TPN and/or TF: No  If yes, type of TF/TPN and rate: NA    PMH, PSH, FH, SH updated and reviewed     ROS:    Review of Systems   Constitutional:  Negative for activity change and unexpected weight change.   HENT:  Negative for sore throat and voice change.    Eyes:  Negative for visual disturbance.   Respiratory:  Negative for shortness of breath.    Cardiovascular:  Negative for chest pain.   Gastrointestinal:  Negative for abdominal pain, constipation, diarrhea, nausea and vomiting.   Genitourinary:   Negative for urgency.   Musculoskeletal:  Negative for arthralgias.   Skin:  Negative for wound.   Neurological:  Negative for headaches.   Psychiatric/Behavioral:  Negative for confusion and sleep disturbance.      Current Medications and/or Treatments Impacting Glycemic Control  Immunotherapy:    Immunosuppressants       None          Steroids:   Hormones (From admission, onward)                Start     Stop Route Frequency Ordered    04/05/22 1650  melatonin tablet 6 mg         -- Oral Nightly PRN 04/05/22 1553          Pressors:    Autonomic Drugs (From admission, onward)                None          Hyperglycemia/Diabetes Medications:   Antihyperglycemics (From admission, onward)                Start     Stop Route Frequency Ordered    04/09/22 2100  insulin detemir U-100 pen 10 Units         -- SubQ Nightly 04/08/22 2153    04/09/22 0715  insulin aspart U-100 pen 6 Units         -- SubQ 3 times daily with meals 04/08/22 2153 04/08/22 2254  insulin aspart U-100 pen 0-5 Units         -- SubQ Before meals & nightly PRN 04/08/22 2154 04/08/22 2152  insulin detemir U-100 pen 4 Units         -- SubQ Once 04/08/22 2153             PHYSICAL EXAMINATION:  Vitals:    04/08/22 1948   BP: (!) 158/79   Pulse: 61   Resp: 19   Temp: 97.5 °F (36.4 °C)     Body mass index is 21.23 kg/m².    Physical Exam  Vitals and nursing note reviewed.   Constitutional:       General: He is not in acute distress.  HENT:      Head: Normocephalic and atraumatic.   Eyes:      General: No scleral icterus.     Conjunctiva/sclera: Conjunctivae normal.   Neck:      Trachea: No tracheal deviation.   Cardiovascular:      Rate and Rhythm: Normal rate.      Heart sounds: Normal heart sounds. No murmur heard.  Pulmonary:      Effort: Pulmonary effort is normal.      Breath sounds: Normal breath sounds.   Abdominal:      Palpations: Abdomen is soft. There is no mass.      Tenderness: There is no abdominal tenderness.      Comments: Insulin  injection sites examined on abdomen, clean, no redness, no nodule/masses     Musculoskeletal:         General: Normal range of motion.      Cervical back: Normal range of motion and neck supple. No rigidity.   Lymphadenopathy:      Cervical: No cervical adenopathy.   Skin:     General: Skin is warm.      Findings: No rash.   Neurological:      Mental Status: He is alert and oriented to person, place, and time.   Psychiatric:         Judgment: Judgment normal.         Labs Reviewed and Include   Recent Labs   Lab 04/08/22  0523   GLU 76   CALCIUM 8.5*   ALBUMIN 2.5*   PROT 6.5      K 3.7   CO2 26      BUN 17   CREATININE 0.8   ALKPHOS 114   ALT 19   AST 30   BILITOT 0.5     Lab Results   Component Value Date    WBC 8.66 04/08/2022    HGB 12.7 (L) 04/08/2022    HCT 38.6 (L) 04/08/2022    MCV 88 04/08/2022     04/08/2022     No results for input(s): TSH, FREET4 in the last 168 hours.  Lab Results   Component Value Date    HGBA1C 9.7 (H) 04/05/2022       Nutritional status:   Body mass index is 21.23 kg/m².  Lab Results   Component Value Date    ALBUMIN 2.5 (L) 04/08/2022    ALBUMIN 2.4 (L) 04/07/2022    ALBUMIN 3.1 (L) 04/05/2022     No results found for: PREALBUMIN    Estimated Creatinine Clearance: 80.7 mL/min (based on SCr of 0.8 mg/dL).    Accu-Checks  Recent Labs     04/07/22  2031 04/07/22  2209 04/07/22  2343 04/08/22  0220 04/08/22  0828 04/08/22  0910 04/08/22  1314 04/08/22  1502 04/08/22  1754 04/08/22  2057   POCTGLUCOSE 293* 250* 204* 160* 50* 85 364* 304* 171* 94        ASSESSMENT and PLAN    * Diabetic ketoacidosis without coma associated with type 2 diabetes mellitus  Resolved      Hypoglycemia unawareness associated with type 2 diabetes mellitus  Recommend relaxed BG parameters  Goal A1c 7.5 to 8 --> goal to prevent low BG levels  BG goal 160-210 while inpatient    Pt will benefit from CGM  Recommend endocrine follow up after discharge         Type 2 diabetes mellitus with  hyperglycemia, with long-term current use of insulin  Pt with Hx of DM 2  Lab Results   Component Value Date    HGBA1C 9.7 (H) 04/05/2022     Pt noted to receive 67 units of insulin yesterday as his BG were elevated   Low BG in am today likely due to long acting regimen too strong     Noted that pt has received some meals without pre-meal insulin leading to glycemic excursions which later require multiple corrections as pt was fed even though sugars >400    Yesterday in am due to low BG after it was treated nurse held his breakfast insulin leading to the hyperglycemia episode in the afternoon    Due to his age recommend A1c goal of 7.5 to 8 and ok for BG to run in the higher side 160-210 due to his hypoglycemia unawareness.       Plan  Recommend levemir 6 units   Recommend aspart 8 units before meal with low dose corrections  POCT before meals, bedtime and 2am   Goal A1c 7.5 to 8  Pt will benefit from a CGM   Will set up follow up in our clinic after DC    Will consider evaluating his pancreatic reserve outpatient: C-peptide    PLEASE: notify endo when pt ready for discharge     PLEASE IF PT BECOMES HYPOGLYCEMIC TREAT THE LOW BG AND ONCE BG NORMALIZE >90 AND TIME FOR FOOD DO NOT HOLD PRE-MEAL INSULIN UNLESS IT HAS BEEN DISCUSSED WITH ENDOCRINE    IF BG >300 DO NOT FEED PATIENT, HOLD HIS MEAL GIVE CORRECTION AND GIVE MEAL ONCE BG CLOSER         Plan discussed with patient, family, and RN at bedside.     Rajeev Sanderson MD  Endocrinology  [facility] - Intensive Care ([place])

## 2022-04-09 NOTE — PROGRESS NOTES
[facility] ([address] Medicine  Progress Note    Patient Name: Kamar Muñoz  MRN: [id number]  Patient Class: IP- Inpatient   Admission Date: 4/5/2022  Length of Stay: 4 days  Attending Physician: Renée Frye MD  Primary Care Provider: Basim Guerrero MD        Subjective:     Principal Problem:Diabetic ketoacidosis without coma associated with type 2 diabetes mellitus        HPI:  Mr. 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 He reports that his home BG runs in 300 and 500s. EMS found his with BG unreadable. Patient states that he took his insulin last night. He took 9 units novolog last night. As of this morning, he has not eaten and did not take his morning insulin. He reports nausea and weakness. Denies chest pain, SOB, palpitations, fever, chills, abdominal pain, constipation. Reports some diarrhea that started in the SNF.     He reports eating toast, cereal, PB&J sandwiches, uses sweet and low for coffee. Denies consuming excessively starchy or sugary foods.     The patient was recently discharged from SNF after being transferred there following his hospitalization for Covid with respiratory failure, DKA, metabolic encephalopathy, requiring ICU admission. He was admitted for weakness/debility and continued insulin adjustments. Admission to SNF was for secondary weakness debility, continued insulin adjustments. BG had been labile during his SNF stay and difficult to control with hypoglycemic events as well. Patient was discharged with insulin aspart 6 TID and glargine 12 units daily with sliding scale.     In the ED, significant labs include BG of 626, bicarb 14, BNB 5.9, UA with 3+ ketones, 3+ glucose, occult blood. VBG showed mild acidosis with pH of 7.3. He  received insulin regular bolus, zofran, NS 1 L x2, and was started on insulin drip.                  Overview/Hospital Course:  Admitted to hospital medicine for DKA with glucose in the 600s and anion gap of 24. No infectious symptoms identified and cardiac eval with ekg/trop were negative. He was started on an insulin gtt and transitioned off the drip within 12 hours of admission. Hospital course c/b labile sugars off the insulin gtt with fasting sugars showing hypoglycemia and then prandial sugars into the 500s requiring additional units of aspart. Upon chart review, appears patient has issues with fasting sugars with frequent hypoglycemic episodes. Endocrinology consulted for help with management of labile blood sugars. Per endo recs, changed to detemir 6, aspart 8 TID. They recommend CGM outpatient. POCT glucose ACHS and 2am.           Interval History: NAOE, BG on low end 90-100s and 2 am glucose of 68. Did have sugary syrup delivered at breakfast. Diet noncompliance due to full sugar items being delivered despite diabetic diet ordered. Pre lunch .     Review of Systems   Constitutional:  Negative for chills and fever.   HENT: Negative.     Eyes: Negative.    Respiratory:  Negative for cough and shortness of breath.    Cardiovascular:  Negative for chest pain, palpitations and leg swelling.   Gastrointestinal:  Negative for abdominal pain, constipation, diarrhea, nausea and vomiting.   Endocrine: Positive for polyuria.   Genitourinary:  Negative for dysuria and hematuria.   Musculoskeletal:  Positive for back pain (sacral). Negative for arthralgias.   Skin:  Positive for wound.   Neurological:  Positive for weakness. Negative for dizziness and headaches.   Hematological: Negative.    Psychiatric/Behavioral: Negative.     Objective:     Vital Signs (Most Recent):  Temp: 97.9 °F (36.6 °C) (04/09/22 1148)  Pulse: 66 (04/09/22 1148)  Resp: 20 (04/09/22 0710)  BP: (!) 140/75 (04/09/22 1148)  SpO2: 97 %  (04/09/22 1148)   Vital Signs (24h Range):  Temp:  [96.7 °F (35.9 °C)-97.9 °F (36.6 °C)] 97.9 °F (36.6 °C)  Pulse:  [60-67] 66  Resp:  [18-20] 20  SpO2:  [95 %-99 %] 97 %  BP: (121-158)/(59-89) 140/75     Weight: 75 kg (165 lb 5.5 oz)  Body mass index is 21.23 kg/m².    Intake/Output Summary (Last 24 hours) at 4/9/2022 1443  Last data filed at 4/9/2022 1200  Gross per 24 hour   Intake 574 ml   Output 900 ml   Net -326 ml      Physical Exam  Constitutional:       Appearance: Normal appearance.   HENT:      Head: Normocephalic and atraumatic.      Nose: Nose normal.      Mouth/Throat:      Mouth: Mucous membranes are dry.   Eyes:      General: No scleral icterus.  Cardiovascular:      Rate and Rhythm: Normal rate and regular rhythm.      Pulses: Normal pulses.      Heart sounds: Normal heart sounds. No murmur heard.  Pulmonary:      Effort: Pulmonary effort is normal. No respiratory distress.      Breath sounds: Normal breath sounds. No wheezing or rales.   Abdominal:      General: Bowel sounds are normal. There is no distension.      Palpations: Abdomen is soft.   Musculoskeletal:         General: Normal range of motion.      Cervical back: Normal range of motion. No rigidity.      Right lower leg: No edema.      Left lower leg: No edema.   Skin:     General: Skin is dry.          Neurological:      General: No focal deficit present.      Mental Status: He is alert and oriented to person, place, and time.      Motor: No weakness.   Psychiatric:         Mood and Affect: Mood normal.         Behavior: Behavior normal.       Significant Labs: All pertinent labs within the past 24 hours have been reviewed.  CBC:   Recent Labs   Lab 04/08/22  0523 04/09/22  0334   WBC 8.66 7.11   HGB 12.7* 12.4*   HCT 38.6* 39.5*    225     CMP:   Recent Labs   Lab 04/07/22  1515 04/08/22  0523 04/09/22  0333   * 139 138   K 5.3* 3.7 3.9    105 104   CO2 23 26 24   * 76 61*   BUN 19 17 19   CREATININE 1.2 0.8 0.8    CALCIUM 8.3* 8.5* 8.1*   PROT  --  6.5 5.8*   ALBUMIN  --  2.5* 2.4*   BILITOT  --  0.5 0.3   ALKPHOS  --  114 113   AST  --  30 28   ALT  --  19 19   ANIONGAP 9 8 10   EGFRNONAA 57.6* >60.0 >60.0     POCT Glucose:   Recent Labs   Lab 04/09/22  0206 04/09/22  0809 04/09/22  1144   POCTGLUCOSE 68* 104 464*       Significant Imaging: I have reviewed all pertinent imaging results/findings within the past 24 hours.      Assessment/Plan:      * Diabetic ketoacidosis without coma associated with type 2 diabetes mellitus  DKA likely 2/2 to inadequate insulin regimen/diet noncompliance/difficulty managing meds at home given that his wife is also ill. Poorly controlled at SNF prior to DC, and likely continuation of that. No evidence of UTI or pneumonia at this time. Does not meet SIRdS/sepsis criteria. Possible source may be sacral injury, but does not appear infected at this time. Insulin drip and IVF on 4/5-4/6. Elevated BG at 500s again on 4/7    -- poct glucose ACHS and 2am  -- HbA1C 9.7  -- diabetic diet and boost glucose control  -- AG closed, bicarb wnl as of 4/6        Type 2 diabetes mellitus with hyperglycemia, with long-term current use of insulin  IDDM2; last A1C 9.7.   home regimen: metformin 1000 BID, aspart 6 TID, glargine 12 - patient states taking 20 and 9 units TIDWM  Currently poorly controlled    Lab Results   Component Value Date    HGBA1C 9.7 (H) 04/05/2022     - detemir 6, aspart 8 TIDWM, LDSSI  - eats small breakfast, and bigger lunch/dinner. May need different doses for premeal insulin by meal.   - patient would benefit from outpt dexcom per endo  - endocrinology consulted 4/8 to assist with management of hyperglycemia/labile sugars. Appreciate assistance.      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 Hypoglycemia does not wake him up    Severe malnutrition    Boost glucose control TID    Chronic  anticoagulation  Chronically on Eliquis      Focal seizures  Continue home lacosamide      History of ST elevation myocardial infarction (STEMI)  Hx of RCA STEMI in Jan s/p LAUREN placement on 1/9/22. Was on ASA/Plavix at home    - Will continue plavix only given that he is on apixaban since it's been greater than 1 month since LAUREN placement  - No need for triple therapy (ASA/Plavix/eliquis) beyond 1 month      Stented coronary artery  CAD s/p STEMI and RCA LAUREN placed 1/9/22      Paroxysmal atrial fibrillation  Patient with Paroxysmal (<7 days) atrial fibrillation which is controlled currently with Amiodarone. Patient is currently in sinus rhythm.[id number] Score: 4. Anticoagulation indicated. Anticoagulation done with eliquis.        History of embolic stroke  Hx of stroke in Jan 2022.     - continue home lipitor  - PT/OT for residual weakness/debilty     Coronary artery disease involving native coronary artery of native heart with unstable angina pectoris  CAD s/p LAUREN to RCA after STEMI in Jan 2022.     - DC aspirin  - Continue statin, plavix, eliquis    Centrilobular emphysema  Clinically asymptomatic      BRENDAN (acute kidney injury)  Patient with acute kidney injury likely d/t Pre-renal azotemia Which is currently improving. Labs reviewed- Renal function/electrolytes with Estimated Creatinine Clearance: 80.7 mL/min (based on SCr of 0.8 mg/dL). according to latest data. Monitor urine output and serial BMP and adjust therapy as needed. Avoid nephrotoxins and renally dose meds for GFR listed above.     - resolved.     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  Will continue home dose gabapentin 200 TID      Essential hypertension  Normotensive on admission.     - continue home dose metoprolol succinate 50 daily and losartan 25 daily        VTE Risk Mitigation (From admission, onward)         Ordered     apixaban tablet 5 mg  2 times daily         04/05/22 1601     IP VTE HIGH RISK PATIENT  Once         04/05/22 2449      Place sequential compression device  Until discontinued         04/05/22 1547                Discharge Planning   ALLIE: 4/12/2022     Code Status: Full Code   Is the patient medically ready for discharge?: No    Reason for patient still in hospital (select all that apply): Treatment, Consult recommendations and Pending disposition  Discharge Plan A: Home Health   Discharge Delays: None known at this time              Ange Alexander MD  Department of Hospital Medicine   [facility] - Intensive Care ([place]-[facility])

## 2022-04-10 VITALS
WEIGHT: 165.38 LBS | HEIGHT: 74 IN | TEMPERATURE: 98 F | DIASTOLIC BLOOD PRESSURE: 78 MMHG | SYSTOLIC BLOOD PRESSURE: 157 MMHG | HEART RATE: 64 BPM | OXYGEN SATURATION: 97 % | RESPIRATION RATE: 16 BRPM | BODY MASS INDEX: 21.23 KG/M2

## 2022-04-10 PROBLEM — R39.81 FUNCTIONAL URINARY INCONTINENCE: Status: ACTIVE | Noted: 2022-04-10

## 2022-04-10 LAB
ALBUMIN SERPL BCP-MCNC: 2.5 G/DL (ref 3.5–5.2)
ALP SERPL-CCNC: 133 U/L (ref 55–135)
ALT SERPL W/O P-5'-P-CCNC: 39 U/L (ref 10–44)
ANION GAP SERPL CALC-SCNC: 10 MMOL/L (ref 8–16)
AST SERPL-CCNC: 63 U/L (ref 10–40)
BASOPHILS # BLD AUTO: 0.04 K/UL (ref 0–0.2)
BASOPHILS NFR BLD: 0.6 % (ref 0–1.9)
BILIRUB SERPL-MCNC: 0.4 MG/DL (ref 0.1–1)
BUN SERPL-MCNC: 24 MG/DL (ref 8–23)
CALCIUM SERPL-MCNC: 8.3 MG/DL (ref 8.7–10.5)
CHLORIDE SERPL-SCNC: 102 MMOL/L (ref 95–110)
CO2 SERPL-SCNC: 24 MMOL/L (ref 23–29)
CREAT SERPL-MCNC: 1 MG/DL (ref 0.5–1.4)
DIFFERENTIAL METHOD: ABNORMAL
EOSINOPHIL # BLD AUTO: 0.7 K/UL (ref 0–0.5)
EOSINOPHIL NFR BLD: 9.7 % (ref 0–8)
ERYTHROCYTE [DISTWIDTH] IN BLOOD BY AUTOMATED COUNT: 16.2 % (ref 11.5–14.5)
EST. GFR  (AFRICAN AMERICAN): >60 ML/MIN/1.73 M^2
EST. GFR  (NON AFRICAN AMERICAN): >60 ML/MIN/1.73 M^2
GLUCOSE SERPL-MCNC: 320 MG/DL (ref 70–110)
HCT VFR BLD AUTO: 38.1 % (ref 40–54)
HGB BLD-MCNC: 12.3 G/DL (ref 14–18)
IMM GRANULOCYTES # BLD AUTO: 0.04 K/UL (ref 0–0.04)
IMM GRANULOCYTES NFR BLD AUTO: 0.6 % (ref 0–0.5)
LYMPHOCYTES # BLD AUTO: 1.9 K/UL (ref 1–4.8)
LYMPHOCYTES NFR BLD: 28 % (ref 18–48)
MAGNESIUM SERPL-MCNC: 1.6 MG/DL (ref 1.6–2.6)
MCH RBC QN AUTO: 29.4 PG (ref 27–31)
MCHC RBC AUTO-ENTMCNC: 32.3 G/DL (ref 32–36)
MCV RBC AUTO: 91 FL (ref 82–98)
MONOCYTES # BLD AUTO: 0.6 K/UL (ref 0.3–1)
MONOCYTES NFR BLD: 9.1 % (ref 4–15)
NEUTROPHILS # BLD AUTO: 3.6 K/UL (ref 1.8–7.7)
NEUTROPHILS NFR BLD: 52 % (ref 38–73)
NRBC BLD-RTO: 0 /100 WBC
PHOSPHATE SERPL-MCNC: 3 MG/DL (ref 2.7–4.5)
PLATELET # BLD AUTO: 220 K/UL (ref 150–450)
PMV BLD AUTO: 10.5 FL (ref 9.2–12.9)
POCT GLUCOSE: 189 MG/DL (ref 70–110)
POCT GLUCOSE: 234 MG/DL (ref 70–110)
POCT GLUCOSE: 297 MG/DL (ref 70–110)
POCT GLUCOSE: 311 MG/DL (ref 70–110)
POCT GLUCOSE: 343 MG/DL (ref 70–110)
POTASSIUM SERPL-SCNC: 4.5 MMOL/L (ref 3.5–5.1)
PROT SERPL-MCNC: 5.9 G/DL (ref 6–8.4)
RBC # BLD AUTO: 4.18 M/UL (ref 4.6–6.2)
SODIUM SERPL-SCNC: 136 MMOL/L (ref 136–145)
WBC # BLD AUTO: 6.82 K/UL (ref 3.9–12.7)

## 2022-04-10 PROCEDURE — 36415 COLL VENOUS BLD VENIPUNCTURE: CPT | Performed by: STUDENT IN AN ORGANIZED HEALTH CARE EDUCATION/TRAINING PROGRAM

## 2022-04-10 PROCEDURE — 80053 COMPREHEN METABOLIC PANEL: CPT | Performed by: STUDENT IN AN ORGANIZED HEALTH CARE EDUCATION/TRAINING PROGRAM

## 2022-04-10 PROCEDURE — 84100 ASSAY OF PHOSPHORUS: CPT

## 2022-04-10 PROCEDURE — 1111F PR DISCHARGE MEDS RECONCILED W/ CURRENT OUTPATIENT MED LIST: ICD-10-PCS | Mod: CPTII,GC,, | Performed by: HOSPITALIST

## 2022-04-10 PROCEDURE — 99232 SBSQ HOSP IP/OBS MODERATE 35: CPT | Mod: GC,,, | Performed by: INTERNAL MEDICINE

## 2022-04-10 PROCEDURE — 25000003 PHARM REV CODE 250

## 2022-04-10 PROCEDURE — 83735 ASSAY OF MAGNESIUM: CPT

## 2022-04-10 PROCEDURE — 85025 COMPLETE CBC W/AUTO DIFF WBC: CPT

## 2022-04-10 PROCEDURE — 99238 PR HOSPITAL DISCHARGE DAY,<30 MIN: ICD-10-PCS | Mod: GC,,, | Performed by: HOSPITALIST

## 2022-04-10 PROCEDURE — 63600175 PHARM REV CODE 636 W HCPCS

## 2022-04-10 PROCEDURE — 99238 HOSP IP/OBS DSCHRG MGMT 30/<: CPT | Mod: GC,,, | Performed by: HOSPITALIST

## 2022-04-10 PROCEDURE — 99232 PR SUBSEQUENT HOSPITAL CARE,LEVL II: ICD-10-PCS | Mod: GC,,, | Performed by: INTERNAL MEDICINE

## 2022-04-10 PROCEDURE — 1111F DSCHRG MED/CURRENT MED MERGE: CPT | Mod: CPTII,GC,, | Performed by: HOSPITALIST

## 2022-04-10 RX ORDER — INSULIN GLARGINE 100 [IU]/ML
8 INJECTION, SOLUTION SUBCUTANEOUS DAILY
Qty: 7.2 ML | Refills: 3 | Status: SHIPPED | OUTPATIENT
Start: 2022-04-10 | End: 2022-04-10 | Stop reason: SDUPTHER

## 2022-04-10 RX ORDER — MAGNESIUM SULFATE HEPTAHYDRATE 40 MG/ML
2 INJECTION, SOLUTION INTRAVENOUS ONCE
Status: COMPLETED | OUTPATIENT
Start: 2022-04-10 | End: 2022-04-10

## 2022-04-10 RX ORDER — INSULIN ASPART 100 [IU]/ML
INJECTION, SOLUTION INTRAVENOUS; SUBCUTANEOUS
Refills: 0 | Status: ON HOLD
Start: 2022-04-10 | End: 2022-05-03 | Stop reason: HOSPADM

## 2022-04-10 RX ORDER — INSULIN ASPART 100 [IU]/ML
8 INJECTION, SOLUTION INTRAVENOUS; SUBCUTANEOUS
Qty: 9 ML | Refills: 3 | Status: ON HOLD | OUTPATIENT
Start: 2022-04-10 | End: 2022-05-03 | Stop reason: HOSPADM

## 2022-04-10 RX ORDER — INSULIN GLARGINE 100 [IU]/ML
8 INJECTION, SOLUTION SUBCUTANEOUS DAILY
Qty: 15 ML | Refills: 3 | Status: ON HOLD | OUTPATIENT
Start: 2022-04-10 | End: 2022-05-03 | Stop reason: SDUPTHER

## 2022-04-10 RX ORDER — INSULIN ASPART 100 [IU]/ML
8 INJECTION, SOLUTION INTRAVENOUS; SUBCUTANEOUS
Qty: 9 ML | Refills: 3 | Status: SHIPPED | OUTPATIENT
Start: 2022-04-10 | End: 2022-04-10 | Stop reason: SDUPTHER

## 2022-04-10 RX ORDER — INSULIN GLARGINE 100 [IU]/ML
6 INJECTION, SOLUTION SUBCUTANEOUS DAILY
Qty: 5.4 ML | Refills: 3 | Status: SHIPPED | OUTPATIENT
Start: 2022-04-10 | End: 2022-04-10 | Stop reason: SDUPTHER

## 2022-04-10 RX ADMIN — MAGNESIUM SULFATE HEPTAHYDRATE 2 G: 2 INJECTION, SOLUTION INTRAVENOUS at 10:04

## 2022-04-10 RX ADMIN — PANTOPRAZOLE SODIUM 40 MG: 40 TABLET, DELAYED RELEASE ORAL at 08:04

## 2022-04-10 RX ADMIN — GABAPENTIN 200 MG: 100 CAPSULE ORAL at 08:04

## 2022-04-10 RX ADMIN — INSULIN ASPART 8 UNITS: 100 INJECTION, SOLUTION INTRAVENOUS; SUBCUTANEOUS at 01:04

## 2022-04-10 RX ADMIN — LACOSAMIDE 100 MG: 100 TABLET, FILM COATED ORAL at 08:04

## 2022-04-10 RX ADMIN — INSULIN ASPART 1 UNITS: 100 INJECTION, SOLUTION INTRAVENOUS; SUBCUTANEOUS at 03:04

## 2022-04-10 RX ADMIN — LOSARTAN POTASSIUM 25 MG: 25 TABLET, FILM COATED ORAL at 08:04

## 2022-04-10 RX ADMIN — INSULIN ASPART 8 UNITS: 100 INJECTION, SOLUTION INTRAVENOUS; SUBCUTANEOUS at 08:04

## 2022-04-10 RX ADMIN — ATORVASTATIN CALCIUM 40 MG: 20 TABLET, FILM COATED ORAL at 08:04

## 2022-04-10 RX ADMIN — GABAPENTIN 200 MG: 100 CAPSULE ORAL at 04:04

## 2022-04-10 RX ADMIN — AMIODARONE HYDROCHLORIDE 200 MG: 200 TABLET ORAL at 08:04

## 2022-04-10 RX ADMIN — MUPIROCIN: 20 OINTMENT TOPICAL at 08:04

## 2022-04-10 RX ADMIN — INSULIN ASPART 4 UNITS: 100 INJECTION, SOLUTION INTRAVENOUS; SUBCUTANEOUS at 01:04

## 2022-04-10 RX ADMIN — APIXABAN 5 MG: 5 TABLET, FILM COATED ORAL at 08:04

## 2022-04-10 RX ADMIN — CLOPIDOGREL 75 MG: 75 TABLET, FILM COATED ORAL at 08:04

## 2022-04-10 RX ADMIN — INSULIN ASPART 1 UNITS: 100 INJECTION, SOLUTION INTRAVENOUS; SUBCUTANEOUS at 06:04

## 2022-04-10 RX ADMIN — INSULIN ASPART 2 UNITS: 100 INJECTION, SOLUTION INTRAVENOUS; SUBCUTANEOUS at 01:04

## 2022-04-10 NOTE — PROGRESS NOTES
SBAR hand off given to Lilia OLIVIER. Pt is awake and alert in bed. Events throughout shift passed to oncoming nurse. NAD noted. Will continue to monitor.

## 2022-04-10 NOTE — PROGRESS NOTES
[facility] ([facility]-)  Endocrinology  Progress Note    Admit Date: 4/5/2022     Reason for Consult: Management of T2DM, Hyperglycemia     Surgical Procedure and Date: NA    Diabetes diagnosis year: >20 years    Home Diabetes Medications:  During recent SNF was on Aspart 6 TID and glargine 12 units daily with sliding scale. Patient reports he was taking at home 20 of lantus at night with 14 units of novolog before meals with moderate correction.     How often checking glucose at home? >4 x day   BG readings on regimen: in am average of 40, several in the 40's and some in the 30's (feels it when in the 30's - weak, shaky an sweaty), does not get low during the day but reports he eats snacks between his meals as he gets hungry. Does not wake up in middle of the night due to low BG, as he does not feel them.     Hypoglycemia on the regimen?  Yes  Missed doses on regimen?  No    Diabetes Complications include:     Hyperglycemia, Hypoglycemia , and Diabetic peripheral neuropathy     Complicating diabetes co morbidities:   History of CVA      HPI:   Patient is a 76 y.o. 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     He reports that his home BG runs in 300 and 500s sometimes during the day. EMS found his with BG unreadable. Patient states that he took his insulin the night prior to admission (9 units of Novolog). During his presentation reported he did not take his morning insulin.    Reported nausea and weakness. Reports some diarrhea that started in the SNF.     Denied chest pain, SOB, palpitations, fever, chills, abdominal pain, constipation.    He reports eating toast, cereal, PB&J sandwiches, uses sweet and low for coffee. Denies consuming excessively starchy or sugary foods.      The patient was  "recently discharged from SNF after being transferred there following his hospitalization for Covid with respiratory failure, DKA, metabolic encephalopathy, requiring ICU admission. He was admitted for weakness/debility and continued insulin adjustments. Admission to SNF was for secondary weakness debility, continued insulin adjustments. BG had been labile during his SNF stay and difficult to control with hypoglycemic events as well. Patient was discharged with insulin aspart 6 TID and glargine 12 units daily with sliding scale.     In the ED, significant labs include BG of 626, bicarb 14, GAP 24, BNB 5.9, UA with 3+ ketones, 3+ glucose, occult blood. VBG showed mild acidosis with pH of 7.3. He received insulin regular bolus, zofran, NS 1 L x2, and was started on insulin drip and transitioned off the drip within 12 hours of admission. Hospital course c/b labile sugars in the 500's. Upon chart review he has Hx with fasting sugars with frequent hypoglycemia episodes.    Endocrine consulted for management of labile blood glucose.                   Interval HPI:   Overnight events:  BG not at goal  Pt having boost and eating entire meal (his meals have been noted to be carb heavy for a diabetic diet while inpatient)    Eatin%  Nausea: No  Hypoglycemia and intervention: Yes  Fever: No  TPN and/or TF: No  If yes, type of TF/TPN and rate: NA    /65 (BP Location: Left arm, Patient Position: Lying)   Pulse 65   Temp 97.6 °F (36.4 °C) (Oral)   Resp 16   Ht 6' 2" (1.88 m)   Wt 75 kg (165 lb 5.5 oz)   SpO2 (!) 93%   BMI 21.23 kg/m²     Labs Reviewed and Include    Recent Labs   Lab 04/10/22  0333   *   CALCIUM 8.3*   ALBUMIN 2.5*   PROT 5.9*      K 4.5   CO2 24      BUN 24*   CREATININE 1.0   ALKPHOS 133   ALT 39   AST 63*   BILITOT 0.4     Lab Results   Component Value Date    WBC 6.82 04/10/2022    HGB 12.3 (L) 04/10/2022    HCT 38.1 (L) 04/10/2022    MCV 91 04/10/2022     " 04/10/2022     No results for input(s): TSH, FREET4 in the last 168 hours.  Lab Results   Component Value Date    HGBA1C 9.7 (H) 04/05/2022       Nutritional status:   Body mass index is 21.23 kg/m².  Lab Results   Component Value Date    ALBUMIN 2.5 (L) 04/10/2022    ALBUMIN 2.4 (L) 04/09/2022    ALBUMIN 2.5 (L) 04/08/2022     No results found for: PREALBUMIN    Estimated Creatinine Clearance: 64.6 mL/min (based on SCr of 1 mg/dL).    Accu-Checks  Recent Labs     04/09/22  1558 04/09/22  1641 04/09/22  1820 04/09/22  2019 04/09/22  2322 04/10/22  0140 04/10/22  0340 04/10/22  0614 04/10/22  0817 04/10/22  1238   POCTGLUCOSE >500* 488* 393* 421* 382* 343* 297* 234* 189* 311*       Current Medications and/or Treatments Impacting Glycemic Control  Immunotherapy:    Immunosuppressants       None          Steroids:   Hormones (From admission, onward)                Start     Stop Route Frequency Ordered    04/05/22 1650  melatonin tablet 6 mg         -- Oral Nightly PRN 04/05/22 1553          Pressors:    Autonomic Drugs (From admission, onward)                None          Hyperglycemia/Diabetes Medications:   Antihyperglycemics (From admission, onward)                Start     Stop Route Frequency Ordered    04/09/22 2100  insulin detemir U-100 pen 6 Units         -- SubQ Nightly 04/09/22 1434    04/09/22 1645  insulin aspart U-100 pen 8 Units         -- SubQ 3 times daily with meals 04/09/22 1148    04/09/22 1534  insulin aspart U-100 pen 0-5 Units         -- SubQ Before meals & nightly PRN 04/09/22 1435            ASSESSMENT and PLAN    * Diabetic ketoacidosis without coma associated with type 2 diabetes mellitus  Resolved      Hypoglycemia unawareness associated with type 2 diabetes mellitus  Recommend relaxed BG parameters  Goal A1c 7.5 to 8 --> goal to prevent low BG levels  BG goal 160-210 while inpatient    Pt will benefit from CGM  Recommend endocrine follow up after discharge         Coronary artery disease  involving native coronary artery of native heart with unstable angina pectoris        Type 2 diabetes mellitus with hyperglycemia, with long-term current use of insulin  Pt with Hx of DM 2  Lab Results   Component Value Date    HGBA1C 9.7 (H) 04/05/2022     Pt noted to receive 67 units of insulin yesterday as his BG were elevated   Low BG in am today likely due to long acting regimen too strong     Noted that pt has received some meals without pre-meal insulin leading to glycemic excursions which later require multiple corrections as pt was fed even though sugars >400    Today in am due to low BG after it was treated nurse held his breakfast insulin leading to the hyperglycemia episode in the afternoon  Due to his age recommend A1c goal of 7.5 to 8 and ok for BG to run in the higher side 160-210 due to his hypoglycemia unawareness  Pt will benefit from a CGM     Plan  Recommend levemir 8 units   Recommend aspart 8 units before meal with low dose corrections  POCT before meals, bedtime and 2am   Goal A1c 7.5 to 8    PLEASE IF PT BECOMES HYPOGLYCEMIC TREAT THE LOW BG AND ONCE BG NORMALIZE >90 AND TIME FOR FOOD DO NOT HOLD PRE-MEAL INSULIN UNLESS IT HAS BEEN DISCUSSED WITH ENDOCRINE    IF BG >300 DO NOT FEED PATIENT, HOLD HIS MEAL GIVE CORRECTION AND GIVE MEAL ONCE BG CLOSER     For DC: Consider above regimen, will set up a follow up in our clinic     Rajeev Sanderson MD  Endocrinology  [facility] ([place])

## 2022-04-10 NOTE — DISCHARGE SUMMARY
[facility] ([place] [address])  [facility]  Discharge Summary      Patient Name: Kamar Muñoz  MRN: [id number]  Patient Class: IP- Inpatient  Admission Date: 4/5/2022  Hospital Length of Stay: 5 days  Discharge Date and Time:  04/10/2022 1:09 PM  Attending Physician: Renée Frye MD   Discharging Provider: Ange Alexander MD  Primary Care Provider: Basim Guerrero MD  [facility] Team: [facility] Ange Alexander MD    HPI:   Mr. 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 He reports that his home BG runs in 300 and 500s. EMS found his with BG unreadable. Patient states that he took his insulin last night. He took 9 units novolog last night. As of this morning, he has not eaten and did not take his morning insulin. He reports nausea and weakness. Denies chest pain, SOB, palpitations, fever, chills, abdominal pain, constipation. Reports some diarrhea that started in the SNF.     He reports eating toast, cereal, PB&J sandwiches, uses sweet and low for coffee. Denies consuming excessively starchy or sugary foods.     The patient was recently discharged from SNF after being transferred there following his hospitalization for Covid with respiratory failure, DKA, metabolic encephalopathy, requiring ICU admission. He was admitted for weakness/debility and continued insulin adjustments. Admission to SNF was for secondary weakness debility, continued insulin adjustments. BG had been labile during his SNF stay and difficult to control with hypoglycemic events as well. Patient was discharged with insulin aspart 6 TID and glargine 12 units daily with sliding scale.     In the ED, significant labs include BG of 626, bicarb 14, BNB 5.9, UA with 3+ ketones, 3+ glucose, occult blood. VBG  showed mild acidosis with pH of 7.3. He received insulin regular bolus, zofran, NS 1 L x2, and was started on insulin drip.                  * No surgery found *      Hospital Course:   Admitted to hospital medicine for DKA with glucose in the 600s and anion gap of 24. No infectious symptoms identified and cardiac eval with ekg/trop were negative. He was started on an insulin gtt and transitioned off the drip within 12 hours of admission. Hospital course c/b labile sugars off the insulin gtt with fasting sugars showing hypoglycemia and then prandial sugars into the 500s requiring additional units of aspart. Upon chart review, appears patient has issues with fasting sugars with frequent hypoglycemic episodes. Endocrinology consulted for help with management of labile blood sugars. Per endo recs, changed to detemir 6, aspart 8 TID. They recommend CGM outpatient. POCT glucose ACHS and 2am. BG improved on 4/10. Will DC with HH. Insulin regimen 8 TIDWM, 8 long acting, and low dose insulin sliding scale.          Goals of Care Treatment Preferences:  Code Status: Full Code    Vitals:    04/10/22 0300 04/10/22 0818 04/10/22 0845 04/10/22 1123   BP: (!) 143/75 135/66  125/65   BP Location: Right arm   Left arm   Patient Position: Lying   Lying   Pulse: 62  (!) 58 65   Resp:  16     Temp: 97.6 °F (36.4 °C)  97.5 °F (36.4 °C) 97.6 °F (36.4 °C)   TempSrc: Oral   Oral   SpO2: (!) 94%  95% (!) 93%   Weight:       Height:         Physical Exam  Constitutional:       Appearance: Normal appearance.   HENT:      Head: Normocephalic and atraumatic.      Nose: Nose normal.      Mouth/Throat:      Mouth: Mucous membranes are moist.   Eyes:      General: No scleral icterus.     Extraocular Movements: Extraocular movements intact.      Conjunctiva/sclera: Conjunctivae normal.   Cardiovascular:      Rate and Rhythm: Normal rate and regular rhythm.      Pulses: Normal pulses.      Heart sounds: Normal heart sounds. No murmur  heard.  Pulmonary:      Effort: Pulmonary effort is normal. No respiratory distress.      Breath sounds: Normal breath sounds. No wheezing or rales.   Abdominal:      General: Bowel sounds are normal. There is no distension.      Palpations: Abdomen is soft. There is no mass.      Tenderness: There is no abdominal tenderness. There is no guarding.   Musculoskeletal:         General: No swelling, deformity or signs of injury. Normal range of motion.      Cervical back: Normal range of motion. No rigidity.   Skin:     General: Skin is warm and dry.      Capillary Refill: Capillary refill takes less than 2 seconds.      Coloration: Skin is not jaundiced.      Findings: No bruising.      Comments: Stage 1 sacral wound. Erythematous sacral area   Neurological:      General: No focal deficit present.      Mental Status: He is alert and oriented to person, place, and time.      Motor: No weakness.   Psychiatric:         Mood and Affect: Mood normal.         Behavior: Behavior normal.         Thought Content: Thought content normal.       Consults:   Consults (From admission, onward)        Status Ordering Provider     Inpatient consult to Endocrinology  Once        Provider:  (Not yet assigned)    Completed MARGARET ANDRADE     Inpatient consult to Registered Dietitian/Nutritionist  Once        Provider:  (Not yet assigned)    Completed HUGO SWIFT          * Diabetic ketoacidosis without coma associated with type 2 diabetes mellitus  DKA likely 2/2 to inadequate insulin regimen/diet noncompliance/difficulty managing meds at home given that his wife is also ill. Poorly controlled at SNF prior to DC, and likely continuation of that. No evidence of UTI or pneumonia at this time. Does not meet SIRdS/sepsis criteria. Possible source may be sacral injury, but does not appear infected at this time. Insulin drip and IVF on 4/5-4/6. Elevated BG at 500s again on 4/7    -- poct glucose ACHS and 2am  -- HbA1C 9.7  -- diabetic diet  and boost glucose control  -- AG closed, bicarb wnl as of 4/6        Type 2 diabetes mellitus with hyperglycemia, with long-term current use of insulin  IDDM2; last A1C 9.7.   home regimen: metformin 1000 BID, aspart 6 TID, glargine 12 - patient states taking 20 and 9 units TIDWM  Currently poorly controlled    Lab Results   Component Value Date    HGBA1C 9.7 (H) 04/05/2022     - detemir 8, aspart 8 TIDWM, LDSSI  - At home, eats small breakfast, and bigger lunch/dinner. May need different doses for premeal insulin by meal.   - patient would benefit from outpt dexcom per endo  - endocrinology consulted 4/8 to assist with management of hyperglycemia/labile sugars. Appreciate assistance.  - F/u endo outpt.   - Diabetic diet handouts included as part of DC paperwork      Functional urinary incontinence  Patient reports difficulty getting to the bathroom in time and frequent leakage through his depends before he realizes he has to void.     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 Hypoglycemia does not wake him up    Severe malnutrition    Albumin 2.5. DC'd boost glucose control per endo recs.     Chronic anticoagulation  Chronically on Eliquis      Focal seizures  Continue home lacosamide      History of ST elevation myocardial infarction (STEMI)  Hx of RCA STEMI in [facility] s/p LAUREN placement on 1/9/22. Was on ASA/Plavix at home    - Will continue plavix only given that he is on apixaban since it's been greater than 1 month since LAUREN placement  - No need for triple therapy (ASA/Plavix/eliquis) beyond 1 month      Stented coronary artery  CAD s/p STEMI and RCA LAUREN placed 1/9/22      Paroxysmal atrial fibrillation  Patient with Paroxysmal (<7 days) atrial fibrillation which is controlled currently with Amiodarone. Patient is currently in sinus rhythm.[id number]  Score: 4. Anticoagulation indicated. Anticoagulation done with eliquis.        History of embolic stroke  Hx of stroke in Jan 2022.     - continue home lipitor  - PT/OT for residual weakness/debilty     Coronary artery disease involving native coronary artery of native heart with unstable angina pectoris  CAD s/p LAUREN to RCA after STEMI in Jan 2022.     - DC aspirin  - Continue statin, plavix, eliquis    Centrilobular emphysema  Clinically asymptomatic      BRENDAN (acute kidney injury)  Patient with acute kidney injury likely d/t Pre-renal azotemia Which is currently improving. Labs reviewed- Renal function/electrolytes with Estimated Creatinine Clearance: 64.6 mL/min (based on SCr of 1 mg/dL). according to latest data. Monitor urine output and serial BMP and adjust therapy as needed. Avoid nephrotoxins and renally dose meds for GFR listed above.     - resolved.     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  Will continue home dose gabapentin 200 TID      Essential hypertension  Normotensive on admission.     - continue home dose metoprolol succinate 50 daily and losartan 25 daily      Final Active Diagnoses:    Diagnosis Date Noted POA    PRINCIPAL PROBLEM:  Diabetic ketoacidosis without coma associated with type 2 diabetes mellitus [E11.10]  Yes    Type 2 diabetes mellitus with hyperglycemia, with long-term current use of insulin [E11.65, Z79.4] 03/08/2013 Not Applicable     Chronic    Functional urinary incontinence [R39.81] 04/10/2022 Yes    Hypoglycemia unawareness associated with type 2 diabetes mellitus [E11.649] 04/08/2022 Yes    Severe malnutrition [E43] 04/06/2022 Yes    Chronic anticoagulation [Z79.01] 04/05/2022 Not Applicable    Focal seizures [R56.9] 01/26/2022 Yes     Chronic    History of ST elevation myocardial infarction (STEMI) [I25.2] 01/19/2022 Not Applicable     Chronic    Stented coronary artery [Z95.5] 01/19/2022 Not Applicable    Paroxysmal atrial fibrillation [I48.0] 01/12/2022 Yes      Chronic    History of embolic stroke [Z86.73] 01/12/2022 Not Applicable    Coronary artery disease involving native coronary artery of native heart with unstable angina pectoris [I25.110] 01/09/2022 Yes     Chronic    Centrilobular emphysema [J43.2] 12/12/2018 Yes    BRENDAN (acute kidney injury) [N17.9] 08/30/2016 Yes    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    Why: On schedule for Monday, 4/11/2022. Call number for questions.  Contact information:  [phone]           [facility]. Schedule an appointment as soon as possible for a visit in 1 week(s).    Specialty: Endocrinology  Contact information:  [address]  [facility] [address] [phone]  Additional information:  [facility] - Main Building, 6th Floor   Please park in [facility] and take Clinic elevator                     Patient Instructions:      Ambulatory referral/consult to Endocrinology   Standing Status: Future   Referral Priority: Routine Referral Type: Consultation   Requested Specialty: Endocrinology   Number of Visits Requested: 1     Diet diabetic     Notify your health care provider if you experience any of the following:  temperature >100.4     Notify your health care provider if you experience any of the following:  persistent nausea and vomiting or diarrhea     Notify your health care provider if you experience any of the following:  persistent dizziness, light-headedness, or visual disturbances     Notify your health care provider if you experience any of the following:  redness, tenderness, or signs of infection (pain, swelling, redness, odor or green/yellow discharge around incision site)      Notify your health care provider if you experience any of the following:  severe uncontrolled pain     Notify your health care provider if you experience any of the following:  increased confusion or weakness     Activity as tolerated       Significant Diagnostic Studies: Labs:   BMP:   Recent Labs   Lab 04/09/22  0333 04/09/22  1638 04/10/22  0333   GLU 61* 476* 320*     --  136   K 3.9  --  4.5     --  102   CO2 24  --  24   BUN 19  --  24*   CREATININE 0.8  --  1.0   CALCIUM 8.1*  --  8.3*   MG 1.6  --  1.6    and A1C:   Recent Labs   Lab 12/29/21  0810 01/26/22  1512 04/05/22  1138   HGBA1C 12.3* 11.5* 9.7*       Pending Diagnostic Studies:     Procedure Component Value Units Date/Time    Basic metabolic panel [[id number]] Collected: 04/05/22 1726    Order Status: Sent Lab Status: In process Updated: 04/05/22 1726    Specimen: Blood     Beta - Hydroxybutyrate, Serum [[id number]] Collected: 04/05/22 1156    Order Status: Sent Lab Status: In process Updated: 04/05/22 1156    Specimen: Blood          Medications:  Reconciled Home Medications:      Medication List      CHANGE how you take these medications    * insulin aspart U-100 100 unit/mL (3 mL) Inpn pen  Commonly known as: NovoLOG  **LOW CORRECTION DOSE**  Blood Glucose  mg/dL                  Pre-meal                  151-200                0 unit                        201-250                2 units                     251-300                3 units                    301-350                4 units              >350                     5 units                      Administer subcutaneously if needed at times designated by monitoring schedule.  What changed: You were already taking a medication with the same name, and this prescription was added. Make sure you understand how and when to take each.     * insulin aspart U-100 100 unit/mL (3 mL) Inpn pen  Commonly known as: NovoLOG  Inject 8 Units into the skin 3 (three) times daily with  meals.  What changed:   · how much to take  · when to take this     insulin glargine 100 unit/mL injection  Commonly known as: Lantus  Inject 8 Units into the skin once daily.  What changed: how much to take         * This list has 2 medication(s) that are the same as other medications prescribed for you. Read the directions carefully, and ask your doctor or other care provider to review them with you.            CONTINUE taking these medications    amiodarone 200 MG Tab  Commonly known as: PACERONE  Take 1 tablet (200 mg total) by mouth once daily.     aspirin 81 MG EC tablet  Commonly known as: ECOTRIN  Take 81 mg by mouth once daily.     atorvastatin 40 MG tablet  Commonly known as: LIPITOR  Take 1 tablet (40 mg total) by mouth once daily.     blood sugar diagnostic Strp  1 strip by Misc.(Non-Drug; Combo Route) route 2 (two) times a day.     cetirizine 10 MG tablet  Commonly known as: ZYRTEC  Take 1 tablet (10 mg total) by mouth every evening.     clopidogreL 75 mg tablet  Commonly known as: PLAVIX  Take 1 tablet (75 mg total) by mouth once daily.     diclofenac sodium 1 % Gel  Commonly known as: VOLTAREN  Apply 4 g topically 3 (three) times daily. Apply to sacrun closed skin/buttocks     ELIQUIS 5 mg Tab  Generic drug: apixaban  Take 1 tablet (5 mg total) by mouth 2 (two) times daily.     ergocalciferol 50,000 unit Cap  Commonly known as: ERGOCALCIFEROL  Take 1 capsule (50,000 Units total) by mouth every 7 days.     gabapentin 100 MG capsule  Commonly known as: NEURONTIN  Take 2 capsules (200 mg total) by mouth 3 (three) times daily.     lacosamide 100 mg Tab  Commonly known as: VIMPAT  Take 1 tablet (100 mg total) by mouth every 12 (twelve) hours.     lancets Misc  Commonly known as: ONETOUCH ULTRASOFT LANCETS  1 lancet by Misc.(Non-Drug; Combo Route) route 2 (two) times a day.     losartan 25 MG tablet  Commonly known as: COZAAR  Take 1 tablet (25 mg total) by mouth once daily.     magnesium oxide 400 mg  "(241.3 mg magnesium) tablet  Commonly known as: MAG-OX  Take 1 tablet (400 mg total) by mouth 2 (two) times daily.     metFORMIN 1000 MG tablet  Commonly known as: GLUCOPHAGE  Take 1 tablet (1,000 mg total) by mouth 2 (two) times daily with meals.     metoprolol succinate 50 MG 24 hr tablet  Commonly known as: TOPROL-XL  Take 1 tablet (50 mg total) by mouth once daily.     MULTIVITAMIN ORAL  Take 1 tablet by mouth once daily.     neomycin-bacitracin-polymyxin ointment  Commonly known as: NEOSPORIN  Apply topically 2 (two) times daily.     nitroGLYCERIN 0.4 MG SL tablet  Commonly known as: NITROSTAT  Place 1 tablet (0.4 mg total) under the tongue every 5 (five) minutes as needed for Chest pain.     pantoprazole 40 MG tablet  Commonly known as: PROTONIX  Take 1 tablet (40 mg total) by mouth once daily.     * pen needle, diabetic 30 gauge x 5/16" Ndle  Commonly known as: PEN NEEDLE  1 Units by Misc.(Non-Drug; Combo Route) route 3 (three) times daily.     * BD ULTRA-FINE SHORT PEN NEEDLE 31 gauge x 5/16" Ndle  Generic drug: pen needle, diabetic  3 (three) times daily.     polycarbophil 625 mg tablet  Commonly known as: FIBERCON  Take 1 tablet by mouth once daily.     senna-docusate 8.6-50 mg 8.6-50 mg per tablet  Commonly known as: PERICOLACE  Take 1 tablet by mouth once daily.     sucralfate 1 gram tablet  Commonly known as: CARAFATE  Take 1 tablet (1 g total) by mouth 3 (three) times daily before meals.         * This list has 2 medication(s) that are the same as other medications prescribed for you. Read the directions carefully, and ask your doctor or other care provider to review them with you.            STOP taking these medications    albuterol 90 mcg/actuation inhaler  Commonly known as: PROVENTIL/VENTOLIN HFA     diltiaZEM 120 MG Cp24  Commonly known as: CARDIZEM CD     oxyCODONE 5 MG immediate release tablet  Commonly known as: ROXICODONE            Indwelling Lines/Drains at time of discharge: "   Lines/Drains/Airways     None                 Time spent on the discharge of patient: 35 minutes         Ange Alexander MD  Department of Hospital Medicine  [facility] - [facility] ([place]

## 2022-04-10 NOTE — PROGRESS NOTES
Paged Med 1  to make team aware of pt having pain level 8 in back on a pain scale 0-10. Pt stated that Tylenol doesn't help and is requesting something else.Pt is stable. Awaiting call back. Will continue to monitor.          2033   Returned page ( See new orders)

## 2022-04-10 NOTE — PLAN OF CARE
[facility] ([place] [address])      HOME HEALTH ORDERS  FACE TO FACE ENCOUNTER    Patient Name: Kamar Muñoz  YOB: 1945    PCP: Basim Guerrero MD   PCP Address: [address] [facility] / [place] [address]  PCP Phone Number: [phone]  PCP Fax: [phone]    Encounter Date: 4/5/22    Admit to Home Health    Diagnoses:  Active Hospital Problems    Diagnosis  POA    *Diabetic ketoacidosis without coma associated with type 2 diabetes mellitus [E11.10]  Yes    Type 2 diabetes mellitus with hyperglycemia, with long-term current use of insulin [E11.65, Z79.4]  Not Applicable     Priority: 2      Chronic    Hypoglycemia unawareness associated with type 2 diabetes mellitus [E11.649]  Unknown    Severe malnutrition [E43]  Yes    Chronic anticoagulation [Z79.01]  Not Applicable    Focal seizures [R56.9]  Yes     Chronic    History of ST elevation myocardial infarction (STEMI) [I25.2]  Not Applicable     Chronic    Stented coronary artery [Z95.5]  Not Applicable    Paroxysmal atrial fibrillation [I48.0]  Yes     Chronic    History of embolic stroke [Z86.73]  Not Applicable    Coronary artery disease involving native coronary artery of native heart with unstable angina pectoris [I25.110]  Yes     Chronic    Centrilobular emphysema [J43.2]  Yes     Hyperinflated cxr.  Emphysematous changes on lung cuts on ct abd.  Clinically asymptomatic aside from cough.  Baseline pft.  Prn albuterol.        BRENDAN (acute kidney injury) [N17.9]  Yes    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 [E13.40]  Yes     Chronic    Essential hypertension [I10]  Yes      Resolved Hospital Problems   No resolved problems to display.       Follow Up Appointments:  Future Appointments   Date Time Provider Department Center   4/19/2022  9:00 AM Brittani Weems MD [facility]       Allergies:  Review of patient's allergies indicates:   Allergen Reactions    Iodine      Other reaction(s): swelling  Other  reaction(s): Itching  Other reaction(s): Rash       Medications: Review discharge medications with patient and family and provide education.    Current Facility-Administered Medications   Medication Dose Route Frequency Provider Last Rate Last Admin    acetaminophen tablet 650 mg  650 mg Oral Q4H PRN Ange Alexander MD   650 mg at 04/05/22 2105    amiodarone tablet 200 mg  200 mg Oral Daily Ange Alexander MD   200 mg at 04/10/22 0818    apixaban tablet 5 mg  5 mg Oral BID Ange Alexander MD   5 mg at 04/10/22 0818    atorvastatin tablet 40 mg  40 mg Oral Daily Ange Alexander MD   40 mg at 04/10/22 0818    cetirizine tablet 10 mg  10 mg Oral QHS Ange Alexander MD   10 mg at 04/09/22 2022    clopidogreL tablet 75 mg  75 mg Oral Daily Ange Alexander MD   75 mg at 04/10/22 0818    dextrose 10% bolus 125 mL  12.5 g Intravenous PRN Ange Alexander MD        dextrose 10% bolus 250 mL  25 g Intravenous PRN Ange Alexander MD        gabapentin capsule 200 mg  200 mg Oral TID Ange Alexander MD   200 mg at 04/10/22 0818    glucagon (human recombinant) injection 1 mg  1 mg Intramuscular PRN Ange Alexander MD        glucose chewable tablet 16 g  16 g Oral PRN Ange Alexander MD        glucose chewable tablet 24 g  24 g Oral PRN Ange Alexander MD        insulin aspart U-100 pen 0-5 Units  0-5 Units Subcutaneous QID (AC + HS) PRN Ange Alexander MD   1 Units at 04/10/22 0615    insulin aspart U-100 pen 8 Units  8 Units Subcutaneous TIDWM Ange Alexander MD   8 Units at 04/10/22 0826    insulin detemir U-100 pen 6 Units  6 Units Subcutaneous QHS Ange Alexander MD   6 Units at 04/09/22 2024    lacosamide tablet 100 mg  100 mg Oral Q12H Ange Alexander MD   100 mg at 04/10/22 0835    LIDOcaine 5 % patch 1 patch  1 patch Transdermal Q24H Stewart Chan MD   1 patch at 04/08/22 2310    losartan tablet 25 mg  25 mg Oral Daily Ange Alexander MD   25 mg at 04/10/22 0818    melatonin tablet 6 mg  6 mg Oral Nightly PRN Ange Alexander MD   6 mg at 04/08/22 2100     metoprolol succinate (TOPROL-XL) 24 hr tablet 50 mg  50 mg Oral Daily Ange Alexander MD   50 mg at 04/09/22 0905    naloxone 0.4 mg/mL injection 0.02 mg  0.02 mg Intravenous PRN Ange Alexander MD        pantoprazole EC tablet 40 mg  40 mg Oral Daily Ange Alexander MD   40 mg at 04/10/22 0818    polyethylene glycol packet 17 g  17 g Oral Daily PRN Ange Alexander MD        sodium chloride 0.9% flush 10 mL  10 mL Intravenous PRN Ange Alexander MD         Current Discharge Medication List      START taking these medications    Details   !! insulin aspart U-100 (NOVOLOG) 100 unit/mL (3 mL) InPn pen **LOW CORRECTION DOSE**  Blood Glucose  mg/dL                  Pre-meal                  151-200                0 unit                        201-250                2 units                     251-300                3 units                    301-350                4 units              >350                     5 units                      Administer subcutaneously if needed at times designated by monitoring schedule.  Refills: 0       !! - Potential duplicate medications found. Please discuss with provider.      CONTINUE these medications which have CHANGED    Details   !! insulin aspart U-100 (NOVOLOG) 100 unit/mL (3 mL) InPn pen Inject 8 Units into the skin 3 (three) times daily with meals.  Qty: 9 mL, Refills: 3      insulin glargine (LANTUS) 100 unit/mL injection Inject 8 Units into the skin once daily.  Qty: 15 mL, Refills: 3    Associated Diagnoses: Type 2 diabetes mellitus with hyperglycemia, with long-term current use of insulin       !! - Potential duplicate medications found. Please discuss with provider.      CONTINUE these medications which have NOT CHANGED    Details   amiodarone (PACERONE) 200 MG Tab Take 1 tablet (200 mg total) by mouth once daily.  Qty: 90 tablet, Refills: 3      apixaban (ELIQUIS) 5 mg Tab Take 1 tablet (5 mg total) by mouth 2 (two) times daily.  Qty: 60 tablet, Refills: 5      aspirin (ECOTRIN) 81  "MG EC tablet Take 81 mg by mouth once daily.      atorvastatin (LIPITOR) 40 MG tablet Take 1 tablet (40 mg total) by mouth once daily.  Qty: 90 tablet, Refills: 3      BD ULTRA-FINE SHORT PEN NEEDLE 31 gauge x 5/16" Ndle 3 (three) times daily.      blood sugar diagnostic Strp 1 strip by Misc.(Non-Drug; Combo Route) route 2 (two) times a day.  Qty: 200 strip, Refills: 6    Associated Diagnoses: Type 2 diabetes mellitus with diabetic peripheral angiopathy without gangrene, with long-term current use of insulin      cetirizine (ZYRTEC) 10 MG tablet Take 1 tablet (10 mg total) by mouth every evening.  Refills: 0      clopidogreL (PLAVIX) 75 mg tablet Take 1 tablet (75 mg total) by mouth once daily.  Qty: 30 tablet, Refills: 11      diclofenac sodium (VOLTAREN) 1 % Gel Apply 4 g topically 3 (three) times daily. Apply to sacrun closed skin/buttocks      ergocalciferol (ERGOCALCIFEROL) 50,000 unit Cap Take 1 capsule (50,000 Units total) by mouth every 7 days.  Qty: 12 capsule, Refills: 3      gabapentin (NEURONTIN) 100 MG capsule Take 2 capsules (200 mg total) by mouth 3 (three) times daily.  Qty: 180 capsule, Refills: 3      lacosamide (VIMPAT) 100 mg Tab Take 1 tablet (100 mg total) by mouth every 12 (twelve) hours.  Qty: 60 tablet, Refills: 11      lancets (ONETOUCH ULTRASOFT LANCETS) Misc 1 lancet by Misc.(Non-Drug; Combo Route) route 2 (two) times a day.  Qty: 200 each, Refills: 6    Associated Diagnoses: Type 2 diabetes mellitus with diabetic peripheral angiopathy without gangrene, with long-term current use of insulin      losartan (COZAAR) 25 MG tablet Take 1 tablet (25 mg total) by mouth once daily.  Qty: 90 tablet, Refills: 3    Comments: .  Associated Diagnoses: Primary hypertension      magnesium oxide (MAG-OX) 400 mg (241.3 mg magnesium) tablet Take 1 tablet (400 mg total) by mouth 2 (two) times daily.  Refills: 0      metFORMIN (GLUCOPHAGE) 1000 MG tablet Take 1 tablet (1,000 mg total) by mouth 2 (two) times " "daily with meals.  Qty: 60 tablet, Refills: 3      metoprolol succinate (TOPROL-XL) 50 MG 24 hr tablet Take 1 tablet (50 mg total) by mouth once daily.  Qty: 30 tablet, Refills: 3    Comments: .      MULTIVITAMIN ORAL Take 1 tablet by mouth once daily.       neomycin-bacitracin-polymyxin (NEOSPORIN) ointment Apply topically 2 (two) times daily.  Qty: 28 g, Refills: 1      nitroGLYCERIN (NITROSTAT) 0.4 MG SL tablet Place 1 tablet (0.4 mg total) under the tongue every 5 (five) minutes as needed for Chest pain.  Qty: 25 tablet, Refills: 11      pantoprazole (PROTONIX) 40 MG tablet Take 1 tablet (40 mg total) by mouth once daily.  Qty: 30 tablet, Refills: 11      pen needle, diabetic (PEN NEEDLE) 30 gauge x 5/16" Ndle 1 Units by Misc.(Non-Drug; Combo Route) route 3 (three) times daily.  Qty: 100 each, Refills: 3    Associated Diagnoses: Type 2 diabetes mellitus with hyperglycemia, with long-term current use of insulin; Type 2 diabetes mellitus with stage 3 chronic kidney disease, with long-term current use of insulin      polycarbophil (FIBERCON) 625 mg tablet Take 1 tablet by mouth once daily.       senna-docusate 8.6-50 mg (PERICOLACE) 8.6-50 mg per tablet Take 1 tablet by mouth once daily.      sucralfate (CARAFATE) 1 gram tablet Take 1 tablet (1 g total) by mouth 3 (three) times daily before meals.  Qty: 90 tablet, Refills: 3         STOP taking these medications       albuterol (PROVENTIL/VENTOLIN HFA) 90 mcg/actuation inhaler Comments:   Reason for Stopping:         diltiaZEM (CARDIZEM CD) 120 MG Cp24 Comments:   Reason for Stopping:         oxyCODONE (ROXICODONE) 5 MG immediate release tablet Comments:   Reason for Stopping:                 I have seen and examined this patient within the last 30 days. My clinical findings that support the need for the home health skilled services and home bound status are the following:no   Weakness/numbness causing balance and gait disturbance due to Stroke and " Weakness/Debility making it taxing to leave home.  Requiring assistive device to leave home due to unsteady gait caused by  Stroke and Weakness/Debility.  Patient with medication mismanagement issues requiring home bound status as evidenced by  Uncontrolled hyperglycemic/hypoglycemic events.     Diet:   diabetic diet 1800 calorie    Labs:  routine    Referrals/ Consults  Physical Therapy to evaluate and treat. Evaluate for home safety and equipment needs; Establish/upgrade home exercise program. Perform / instruct on therapeutic exercises, gait training, transfer training, and Range of Motion.  Occupational Therapy to evaluate and treat. Evaluate home environment for safety and equipment needs. Perform/Instruct on transfers, ADL training, ROM, and therapeutic exercises.   to evaluate for community resources/long-range planning.  Aide to provide assistance with personal care, ADLs, and vital signs.    Activities:   activity as tolerated    Nursing:   Agency to admit patient within 24 hours of hospital discharge unless specified on physician order or at patient request    SN to complete comprehensive assessment including routine vital signs. Instruct on disease process and s/s of complications to report to MD. Review/verify medication list sent home with the patient at time of discharge  and instruct patient/caregiver as needed. Frequency may be adjusted depending on start of care date.     Skilled nurse to perform up to 3 visits PRN for symptoms related to diagnosis    Notify MD if SBP > 160 or < 90; DBP > 90 or < 50; HR > 120 or < 50; Temp > 101; O2 < 88%; Other:       Ok to schedule additional visits based on staff availability and patient request on consecutive days within the home health episode.    When multiple disciplines ordered:    Start of Care occurs on Sunday - Wednesday schedule remaining discipline evaluations as ordered on separate consecutive days following the start of care.    Thursday  SOC -schedule subsequent evaluations Friday and Monday the following week.     Friday - Saturday SOC - schedule subsequent discipline evaluations on consecutive days starting Monday of the following week.    For all post-discharge communication and subsequent orders please contact patient's primary care physician. If unable to reach primary care physician or do not receive response within 30 minutes, please contact [facility] for clinical staff order clarification    Miscellaneous   Diabetic Care:   SN to perform and educate Diabetic management with blood glucose monitoring: and Fingerstick blood sugar AC and HS    Home Health Aide:  Nursing Three times weekly, Physical Therapy Three times weekly and Occupational Therapy Three times weekly    Wound Care Orders  yes:  Pressure Ulcer(s) Stage I :   Location: sacrum  Apply Miconzazole:  Sacral spine/coccyx- Triad ointment BID/prn   Neosporin ointment BID.                        Frequency:  Daily                             If incontinent of stool or urine, apply thin layer Barrier cream                   twice daily and PRN to wound         Pressure relief measure:  for pressure redistribution             I certify that this patient is confined to his home and needs intermittent skilled nursing care, physical therapy and occupational therapy.

## 2022-04-10 NOTE — ASSESSMENT & PLAN NOTE
Patient reports difficulty getting to the bathroom in time and frequent leakage through his depends before he realizes he has to void.     - advised on scheduled voiding q2h to minimize urgent need for voiding

## 2022-04-10 NOTE — ASSESSMENT & PLAN NOTE
Pt with Hx of DM 2  Lab Results   Component Value Date    HGBA1C 9.7 (H) 04/05/2022     Pt noted to receive 67 units of insulin yesterday as his BG were elevated   Low BG in am today likely due to long acting regimen too strong     Noted that pt has received some meals without pre-meal insulin leading to glycemic excursions which later require multiple corrections as pt was fed even though sugars >400    Today in am due to low BG after it was treated nurse held his breakfast insulin leading to the hyperglycemia episode in the afternoon  Due to his age recommend A1c goal of 7.5 to 8 and ok for BG to run in the higher side 160-210 due to his hypoglycemia unawareness  Pt will benefit from a CGM     Plan  Recommend levemir 8 units   Recommend aspart 8 units before meal with low dose corrections  POCT before meals, bedtime and 2am   Goal A1c 7.5 to 8    PLEASE IF PT BECOMES HYPOGLYCEMIC TREAT THE LOW BG AND ONCE BG NORMALIZE >90 AND TIME FOR FOOD DO NOT HOLD PRE-MEAL INSULIN UNLESS IT HAS BEEN DISCUSSED WITH ENDOCRINE    IF BG >300 DO NOT FEED PATIENT, HOLD HIS MEAL GIVE CORRECTION AND GIVE MEAL ONCE BG CLOSER

## 2022-04-10 NOTE — ASSESSMENT & PLAN NOTE
IDDM2; last A1C 9.7.   home regimen: metformin 1000 BID, aspart 6 TID, glargine 12 - patient states taking 20 and 9 units TIDWM  Currently poorly controlled    Lab Results   Component Value Date    HGBA1C 9.7 (H) 04/05/2022     - detemir 8, aspart 8 TIDWM, LDSSI  - At home, eats small breakfast, and bigger lunch/dinner. May need different doses for premeal insulin by meal.   - patient would benefit from outpt dexcom per endo  - endocrinology consulted 4/8 to assist with management of hyperglycemia/labile sugars. Appreciate assistance.  - F/u endo outpt.   - Diabetic diet handouts included as part of DC paperwork

## 2022-04-10 NOTE — ASSESSMENT & PLAN NOTE
Patient with Paroxysmal (<7 days) atrial fibrillation which is controlled currently with Amiodarone. Patient is currently in sinus rhythm.[id number] Score: 4. Anticoagulation indicated. Anticoagulation done with eliquis.

## 2022-04-10 NOTE — PROGRESS NOTES
Discharge instructions reviewed. Pt's daughter verbalized understanding. Iv med locks removed. Pt wheeled of the unit with house staff in stable condition. Advised pt to follow up with all future appointments.

## 2022-04-10 NOTE — ASSESSMENT & PLAN NOTE
Patient with acute kidney injury likely d/t Pre-renal azotemia Which is currently improving. Labs reviewed- Renal function/electrolytes with Estimated Creatinine Clearance: 64.6 mL/min (based on SCr of 1 mg/dL). according to latest data. Monitor urine output and serial BMP and adjust therapy as needed. Avoid nephrotoxins and renally dose meds for GFR listed above.     - resolved.

## 2022-04-10 NOTE — PROGRESS NOTES
SBAR hand off taken alejo Puentes RN. Pt is awake and alert in bed talking on phone. AOX4 able to make needs known. Pt verbalized pain level 8 in back on a pain scale 0-10. Pt stated tylenol doesn't work requesting med other than tylenol. Will make team aware. Safety measures in place. Bedside table, handheld, and belongings ae within reach. NAD noted. Will continue to monitor.

## 2022-04-10 NOTE — MEDICAL/APP STUDENT
Progress Note  Hospital Medicine                                                              Team: [facility] HOSP MED 1    Patient Name: Kamar Muñoz  YOB: 1945    Admit Date: 4/5/2022                     LOS: 5    SUBJECTIVE:     Reason for Admission:  Diabetic ketoacidosis without coma associated with type 2 diabetes mellitus  See H&P for detailed initial presentation history and ROS.      Interval history: Pt states he is feeling good and wanting to get up more to regain his strength. He stated that he had increased urinary frequency yesterday when his sugars were high but otherwise no changes.      OBJECTIVE:     Vital Signs Range (Last 24H):  Temp:  [97.4 °F (36.3 °C)-98.2 °F (36.8 °C)]   Pulse:  [58-70]   Resp:  [16-20]   BP: (122-144)/(64-75)   SpO2:  [93 %-97 %] Body mass index is 21.23 kg/m².  Wt Readings from Last 3 Encounters:   04/06/22 1100 75 kg (165 lb 5.5 oz)   04/06/22 0426 75 kg (165 lb 5.5 oz)   04/05/22 1101 75 kg (165 lb 5.6 oz)   03/27/22 0515 75 kg (165 lb 5.5 oz)   03/20/22 0332 75.2 kg (165 lb 12.6 oz)   03/16/22 0500 77.4 kg (170 lb 10.2 oz)   03/11/22 1454 77 kg (169 lb 12.1 oz)   03/10/22 1633 77 kg (169 lb 12.1 oz)   02/21/22 2300 76.4 kg (168 lb 6.9 oz)   02/21/22 1100 76.4 kg (168 lb 6.9 oz)   02/20/22 0004 76.4 kg (168 lb 6.9 oz)   02/19/22 1852 94.8 kg (209 lb)       I & O (Last 24H):    Intake/Output Summary (Last 24 hours) at 4/10/2022 1224  Last data filed at 4/10/2022 0745  Gross per 24 hour   Intake 477 ml   Output 375 ml   Net 102 ml       Physical Exam:  Physical Exam  Vitals and nursing note reviewed.   Constitutional:       Appearance: Normal appearance. He is normal weight.   Eyes:      Extraocular Movements: Extraocular movements intact.      Pupils: Pupils are equal, round, and reactive to light.   Cardiovascular:      Rate and Rhythm: Normal rate and regular rhythm.      Pulses: Normal pulses.      Heart sounds: Normal heart sounds.   Pulmonary:       Effort: Pulmonary effort is normal.      Breath sounds: Normal breath sounds.   Abdominal:      General: Abdomen is flat. Bowel sounds are normal.      Palpations: Abdomen is soft.   Musculoskeletal:         General: Normal range of motion.   Skin:     General: Skin is warm and dry.      Capillary Refill: Capillary refill takes less than 2 seconds.   Neurological:      Mental Status: He is alert and oriented to person, place, and time.      Motor: Weakness present.   Psychiatric:         Mood and Affect: Mood normal.         Behavior: Behavior normal.         Diagnostic Results:  Lab Results   Component Value Date    WBC 6.82 04/10/2022    HGB 12.3 (L) 04/10/2022    HCT 38.1 (L) 04/10/2022    MCV 91 04/10/2022     04/10/2022     Recent Labs   Lab 04/10/22  0333   *      K 4.5      CO2 24   BUN 24*   CREATININE 1.0   CALCIUM 8.3*   MG 1.6     Lab Results   Component Value Date    INR 1.2 02/19/2022    INR 1.1 02/17/2022    INR 1.0 01/25/2022     Lab Results   Component Value Date    HGBA1C 9.7 (H) 04/05/2022     Recent Labs     04/09/22  1641 04/09/22  1820 04/09/22  2019 04/09/22  2322 04/10/22  0140 04/10/22  0340 04/10/22  0614 04/10/22  0817   POCTGLUCOSE 488* 393* 421* 382* 343* 297* 234* 189*       ASSESSMENT/PLAN:     Current Problems List:  Active Hospital Problems    Diagnosis  POA    *Diabetic ketoacidosis without coma associated with type 2 diabetes mellitus [E11.10]  Yes    Hypoglycemia unawareness associated with type 2 diabetes mellitus [E11.649]  Unknown    Severe malnutrition [E43]  Yes    Chronic anticoagulation [Z79.01]  Not Applicable    Focal seizures [R56.9]  Yes     Chronic    History of ST elevation myocardial infarction (STEMI) [I25.2]  Not Applicable     Chronic    Stented coronary artery [Z95.5]  Not Applicable    Paroxysmal atrial fibrillation [I48.0]  Yes     Chronic    History of embolic stroke [Z86.73]  Not Applicable    Coronary artery disease  involving native coronary artery of native heart with unstable angina pectoris [I25.110]  Yes     Chronic    Centrilobular emphysema [J43.2]  Yes     Hyperinflated cxr.  Emphysematous changes on lung cuts on ct abd.  Clinically asymptomatic aside from cough.  Baseline pft.  Prn albuterol.       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       Active Problems, Status & Treatment Plan Addressed Today:  Type II DM  -BG continuing to peak to >500 after meals with troughs in the 60s  -    Signing Student:  Deloris Guido MS3

## 2022-04-10 NOTE — PLAN OF CARE
Per JASIEL's notes on Friday, [facility] is set up for patient and will see him on Monday 4/11/2022. PCP appointment already scheduled and on AVS.    Mitzi Adams RN     [phone]

## 2022-04-11 ENCOUNTER — HOSPITAL ENCOUNTER (INPATIENT)
Facility: HOSPITAL | Age: 77
LOS: 22 days | DRG: 637 | End: 2022-05-03
Attending: STUDENT IN AN ORGANIZED HEALTH CARE EDUCATION/TRAINING PROGRAM | Admitting: INTERNAL MEDICINE
Payer: MEDICARE

## 2022-04-11 ENCOUNTER — PATIENT OUTREACH (OUTPATIENT)
Dept: ADMINISTRATIVE | Facility: CLINIC | Age: 77
End: 2022-04-11
Payer: MEDICARE

## 2022-04-11 ENCOUNTER — TELEPHONE (OUTPATIENT)
Dept: ENDOCRINOLOGY | Facility: CLINIC | Age: 77
End: 2022-04-11
Payer: MEDICARE

## 2022-04-11 DIAGNOSIS — E11.649 HYPOGLYCEMIA UNAWARENESS ASSOCIATED WITH TYPE 2 DIABETES MELLITUS: ICD-10-CM

## 2022-04-11 DIAGNOSIS — E87.20 METABOLIC ACIDOSIS: ICD-10-CM

## 2022-04-11 DIAGNOSIS — E43 SEVERE MALNUTRITION: ICD-10-CM

## 2022-04-11 DIAGNOSIS — J15.9 BACTERIAL PNEUMONIA: ICD-10-CM

## 2022-04-11 DIAGNOSIS — R73.9 HYPERGLYCEMIA: ICD-10-CM

## 2022-04-11 DIAGNOSIS — E10.9 KETOSIS-PRONE DIABETES MELLITUS: ICD-10-CM

## 2022-04-11 DIAGNOSIS — R56.9 FOCAL SEIZURES: Chronic | ICD-10-CM

## 2022-04-11 DIAGNOSIS — Z79.4 TYPE 2 DIABETES MELLITUS WITH HYPERGLYCEMIA, WITH LONG-TERM CURRENT USE OF INSULIN: Chronic | ICD-10-CM

## 2022-04-11 DIAGNOSIS — E13.10 DIABETIC KETOSIS: Primary | ICD-10-CM

## 2022-04-11 DIAGNOSIS — E78.5 DYSLIPIDEMIA ASSOCIATED WITH TYPE 2 DIABETES MELLITUS: ICD-10-CM

## 2022-04-11 DIAGNOSIS — Z91.89 AT HIGH RISK FOR SKIN BREAKDOWN: ICD-10-CM

## 2022-04-11 DIAGNOSIS — J98.4 PULMONARY CAVITARY LESION: ICD-10-CM

## 2022-04-11 DIAGNOSIS — R53.81 DEBILITY: ICD-10-CM

## 2022-04-11 DIAGNOSIS — R32 URINARY INCONTINENCE, UNSPECIFIED TYPE: ICD-10-CM

## 2022-04-11 DIAGNOSIS — E11.69 DYSLIPIDEMIA ASSOCIATED WITH TYPE 2 DIABETES MELLITUS: ICD-10-CM

## 2022-04-11 DIAGNOSIS — R07.9 CHEST PAIN: ICD-10-CM

## 2022-04-11 DIAGNOSIS — E11.65 TYPE 2 DIABETES MELLITUS WITH HYPERGLYCEMIA, WITH LONG-TERM CURRENT USE OF INSULIN: Chronic | ICD-10-CM

## 2022-04-11 LAB
ALBUMIN SERPL BCP-MCNC: 3 G/DL (ref 3.5–5.2)
ALP SERPL-CCNC: 151 U/L (ref 55–135)
ALT SERPL W/O P-5'-P-CCNC: 25 U/L (ref 10–44)
ANION GAP SERPL CALC-SCNC: 35 MMOL/L (ref 8–16)
AST SERPL-CCNC: 20 U/L (ref 10–40)
B-OH-BUTYR BLD STRIP-SCNC: 5.2 MMOL/L (ref 0–0.5)
BASOPHILS # BLD AUTO: 0.1 K/UL (ref 0–0.2)
BASOPHILS NFR BLD: 0.6 % (ref 0–1.9)
BILIRUB SERPL-MCNC: 0.4 MG/DL (ref 0.1–1)
BUN SERPL-MCNC: 42 MG/DL (ref 8–23)
CALCIUM SERPL-MCNC: 9.3 MG/DL (ref 8.7–10.5)
CHLORIDE SERPL-SCNC: 91 MMOL/L (ref 95–110)
CO2 SERPL-SCNC: 6 MMOL/L (ref 23–29)
CREAT SERPL-MCNC: 2.3 MG/DL (ref 0.5–1.4)
DIFFERENTIAL METHOD: ABNORMAL
EOSINOPHIL # BLD AUTO: 0 K/UL (ref 0–0.5)
EOSINOPHIL NFR BLD: 0.2 % (ref 0–8)
ERYTHROCYTE [DISTWIDTH] IN BLOOD BY AUTOMATED COUNT: 15.7 % (ref 11.5–14.5)
EST. GFR  (AFRICAN AMERICAN): 30.3 ML/MIN/1.73 M^2
EST. GFR  (NON AFRICAN AMERICAN): 26.2 ML/MIN/1.73 M^2
GLUCOSE SERPL-MCNC: 1015 MG/DL (ref 70–110)
HCT VFR BLD AUTO: 42 % (ref 40–54)
HGB BLD-MCNC: 12.1 G/DL (ref 14–18)
IMM GRANULOCYTES # BLD AUTO: 0.27 K/UL (ref 0–0.04)
IMM GRANULOCYTES NFR BLD AUTO: 1.6 % (ref 0–0.5)
LACTATE SERPL-SCNC: 10.3 MMOL/L (ref 0.5–2.2)
LYMPHOCYTES # BLD AUTO: 2.3 K/UL (ref 1–4.8)
LYMPHOCYTES NFR BLD: 13.3 % (ref 18–48)
MCH RBC QN AUTO: 28.5 PG (ref 27–31)
MCHC RBC AUTO-ENTMCNC: 28.8 G/DL (ref 32–36)
MCV RBC AUTO: 99 FL (ref 82–98)
MONOCYTES # BLD AUTO: 1.2 K/UL (ref 0.3–1)
MONOCYTES NFR BLD: 7.1 % (ref 4–15)
NEUTROPHILS # BLD AUTO: 13.2 K/UL (ref 1.8–7.7)
NEUTROPHILS NFR BLD: 77.2 % (ref 38–73)
NRBC BLD-RTO: 0 /100 WBC
PLATELET # BLD AUTO: 306 K/UL (ref 150–450)
PMV BLD AUTO: 10.8 FL (ref 9.2–12.9)
POCT GLUCOSE: >500 MG/DL (ref 70–110)
POTASSIUM SERPL-SCNC: 7 MMOL/L (ref 3.5–5.1)
PROT SERPL-MCNC: 7.4 G/DL (ref 6–8.4)
RBC # BLD AUTO: 4.24 M/UL (ref 4.6–6.2)
SODIUM SERPL-SCNC: 132 MMOL/L (ref 136–145)
TROPONIN I SERPL DL<=0.01 NG/ML-MCNC: 0.02 NG/ML (ref 0–0.03)
WBC # BLD AUTO: 17.05 K/UL (ref 3.9–12.7)

## 2022-04-11 PROCEDURE — 99285 EMERGENCY DEPT VISIT HI MDM: CPT | Mod: 25

## 2022-04-11 PROCEDURE — 84484 ASSAY OF TROPONIN QUANT: CPT

## 2022-04-11 PROCEDURE — 93010 EKG 12-LEAD: ICD-10-PCS | Mod: ,,, | Performed by: INTERNAL MEDICINE

## 2022-04-11 PROCEDURE — 12000002 HC ACUTE/MED SURGE SEMI-PRIVATE ROOM

## 2022-04-11 PROCEDURE — 84132 ASSAY OF SERUM POTASSIUM: CPT

## 2022-04-11 PROCEDURE — 25000003 PHARM REV CODE 250: Performed by: STUDENT IN AN ORGANIZED HEALTH CARE EDUCATION/TRAINING PROGRAM

## 2022-04-11 PROCEDURE — 63600175 PHARM REV CODE 636 W HCPCS

## 2022-04-11 PROCEDURE — 25000003 PHARM REV CODE 250

## 2022-04-11 PROCEDURE — 84295 ASSAY OF SERUM SODIUM: CPT

## 2022-04-11 PROCEDURE — 99291 PR CRITICAL CARE, E/M 30-74 MINUTES: ICD-10-PCS | Mod: ,,,

## 2022-04-11 PROCEDURE — 85025 COMPLETE CBC W/AUTO DIFF WBC: CPT

## 2022-04-11 PROCEDURE — 96372 THER/PROPH/DIAG INJ SC/IM: CPT

## 2022-04-11 PROCEDURE — 99285 PR EMERGENCY DEPT VISIT,LEVEL V: ICD-10-PCS | Mod: ,,, | Performed by: STUDENT IN AN ORGANIZED HEALTH CARE EDUCATION/TRAINING PROGRAM

## 2022-04-11 PROCEDURE — 80053 COMPREHEN METABOLIC PANEL: CPT

## 2022-04-11 PROCEDURE — 93005 ELECTROCARDIOGRAM TRACING: CPT

## 2022-04-11 PROCEDURE — 99900035 HC TECH TIME PER 15 MIN (STAT)

## 2022-04-11 PROCEDURE — 99285 EMERGENCY DEPT VISIT HI MDM: CPT | Mod: ,,, | Performed by: STUDENT IN AN ORGANIZED HEALTH CARE EDUCATION/TRAINING PROGRAM

## 2022-04-11 PROCEDURE — 85014 HEMATOCRIT: CPT

## 2022-04-11 PROCEDURE — 96361 HYDRATE IV INFUSION ADD-ON: CPT

## 2022-04-11 PROCEDURE — 96365 THER/PROPH/DIAG IV INF INIT: CPT

## 2022-04-11 PROCEDURE — 96375 TX/PRO/DX INJ NEW DRUG ADDON: CPT

## 2022-04-11 PROCEDURE — 82803 BLOOD GASES ANY COMBINATION: CPT

## 2022-04-11 PROCEDURE — 99291 CRITICAL CARE FIRST HOUR: CPT | Mod: ,,,

## 2022-04-11 PROCEDURE — 93010 ELECTROCARDIOGRAM REPORT: CPT | Mod: ,,, | Performed by: INTERNAL MEDICINE

## 2022-04-11 PROCEDURE — 83605 ASSAY OF LACTIC ACID: CPT | Performed by: STUDENT IN AN ORGANIZED HEALTH CARE EDUCATION/TRAINING PROGRAM

## 2022-04-11 PROCEDURE — C9399 UNCLASSIFIED DRUGS OR BIOLOG: HCPCS

## 2022-04-11 PROCEDURE — 83690 ASSAY OF LIPASE: CPT | Performed by: STUDENT IN AN ORGANIZED HEALTH CARE EDUCATION/TRAINING PROGRAM

## 2022-04-11 PROCEDURE — 82962 GLUCOSE BLOOD TEST: CPT

## 2022-04-11 PROCEDURE — 82010 KETONE BODYS QUAN: CPT

## 2022-04-11 PROCEDURE — 82330 ASSAY OF CALCIUM: CPT

## 2022-04-11 PROCEDURE — 63600175 PHARM REV CODE 636 W HCPCS: Performed by: STUDENT IN AN ORGANIZED HEALTH CARE EDUCATION/TRAINING PROGRAM

## 2022-04-11 PROCEDURE — 80048 BASIC METABOLIC PNL TOTAL CA: CPT | Mod: XB | Performed by: STUDENT IN AN ORGANIZED HEALTH CARE EDUCATION/TRAINING PROGRAM

## 2022-04-11 RX ORDER — METRONIDAZOLE 500 MG/1
500 TABLET ORAL EVERY 8 HOURS
Status: DISCONTINUED | OUTPATIENT
Start: 2022-04-12 | End: 2022-04-12

## 2022-04-11 RX ORDER — SODIUM CHLORIDE 0.9 % (FLUSH) 0.9 %
10 SYRINGE (ML) INJECTION
Status: DISCONTINUED | OUTPATIENT
Start: 2022-04-12 | End: 2022-04-11

## 2022-04-11 RX ORDER — SODIUM CHLORIDE 0.9 % (FLUSH) 0.9 %
10 SYRINGE (ML) INJECTION
Status: DISCONTINUED | OUTPATIENT
Start: 2022-04-11 | End: 2022-04-14

## 2022-04-11 RX ORDER — GLUCAGON 1 MG
1 KIT INJECTION
Status: DISCONTINUED | OUTPATIENT
Start: 2022-04-11 | End: 2022-04-12

## 2022-04-11 RX ORDER — DEXTROSE MONOHYDRATE 100 MG/ML
INJECTION, SOLUTION INTRAVENOUS
Status: DISCONTINUED | OUTPATIENT
Start: 2022-04-12 | End: 2022-04-11

## 2022-04-11 RX ORDER — HEPARIN SODIUM 5000 [USP'U]/ML
5000 INJECTION, SOLUTION INTRAVENOUS; SUBCUTANEOUS EVERY 8 HOURS
Status: DISCONTINUED | OUTPATIENT
Start: 2022-04-12 | End: 2022-04-12

## 2022-04-11 RX ORDER — DEXTROSE MONOHYDRATE 100 MG/ML
INJECTION, SOLUTION INTRAVENOUS
Status: DISCONTINUED | OUTPATIENT
Start: 2022-04-11 | End: 2022-04-12

## 2022-04-11 RX ORDER — SODIUM CHLORIDE, SODIUM LACTATE, POTASSIUM CHLORIDE, CALCIUM CHLORIDE 600; 310; 30; 20 MG/100ML; MG/100ML; MG/100ML; MG/100ML
INJECTION, SOLUTION INTRAVENOUS CONTINUOUS
Status: DISCONTINUED | OUTPATIENT
Start: 2022-04-12 | End: 2022-04-12

## 2022-04-11 RX ORDER — DEXTROSE MONOHYDRATE 100 MG/ML
INJECTION, SOLUTION INTRAVENOUS
Status: DISCONTINUED | OUTPATIENT
Start: 2022-04-11 | End: 2022-04-11

## 2022-04-11 RX ORDER — DEXTROSE MONOHYDRATE 100 MG/ML
INJECTION, SOLUTION INTRAVENOUS
Status: DISCONTINUED | OUTPATIENT
Start: 2022-04-12 | End: 2022-04-13

## 2022-04-11 RX ORDER — CEFEPIME HYDROCHLORIDE 1 G/50ML
1 INJECTION, SOLUTION INTRAVENOUS
Status: DISCONTINUED | OUTPATIENT
Start: 2022-04-12 | End: 2022-04-12

## 2022-04-11 RX ADMIN — CALCIUM GLUCONATE 2 G: 98 INJECTION, SOLUTION INTRAVENOUS at 10:04

## 2022-04-11 RX ADMIN — SODIUM CHLORIDE 1000 ML: 0.9 INJECTION, SOLUTION INTRAVENOUS at 09:04

## 2022-04-11 RX ADMIN — INSULIN DETEMIR 20 UNITS: 100 INJECTION, SOLUTION SUBCUTANEOUS at 10:04

## 2022-04-11 RX ADMIN — INSULIN HUMAN 8 UNITS/HR: 1 INJECTION, SOLUTION INTRAVENOUS at 10:04

## 2022-04-11 RX ADMIN — SODIUM CHLORIDE, SODIUM LACTATE, POTASSIUM CHLORIDE, AND CALCIUM CHLORIDE 1000 ML: .6; .31; .03; .02 INJECTION, SOLUTION INTRAVENOUS at 10:04

## 2022-04-11 NOTE — PROGRESS NOTES
C3 nurse attempted to contact Kamar Muñoz for a TCC post hospital discharge follow up call. No answer. Left voicemail with callback information. The patient has a scheduled HOSFU appointment with Dr. Weems on 4/19/2022 @ 9:00 am.

## 2022-04-11 NOTE — PLAN OF CARE
[facility] ([facility])  Discharge Final Note    Primary Care Provider: Basim Guerrero MD  Expected Discharge Date: 4/10/2022    Patient medically ready for discharge to home with [facility] .  Nurse can call report to n/a.  Transportation yes per family.   Is family/patient aware of discharge yes - patient.  Hospital follow up scheduled, yes, in AVS.  Final Discharge Note (most recent)       Final Note - 04/11/22 0717          Final Note    Assessment Type Final Discharge Note (P)      Anticipated Discharge Disposition Home-Health Care Svc (P)      What phone number can be called within the next 1-3 days to see how you are doing after discharge? [phone] (P)      Hospital Resources/Appts/Education Provided Appointments scheduled and added to AVS (P)         Post-Acute Status    Post-Acute Authorization Home Health (P)      Home Health Status Set-up Complete/Auth obtained (P)      Coverage [employer] HMO (P)      Discharge Delays None known at this time (P)                      Important Message from Medicare         Referral Info (most recent)       Referral Info    No documentation.                 Contact Info       [facility]    [phone]       Next Steps: Follow up    Instructions: On schedule for Monday, 4/11/2022. Call number for questions.    [facility] [address]   Specialty: Endocrinology    [address]  [facility] [address]   Phone: [phone]       Next Steps: Schedule an appointment as soon as possible for a visit in 1 week(s)          Future Appointments   Date Time Provider Department Center   4/19/2022  9:00 AM Brittani Weems MD [facility]     [facility]  Case Management  Ext: [phone]  04/11/2022

## 2022-04-12 PROBLEM — G93.40 ENCEPHALOPATHY: Status: ACTIVE | Noted: 2022-04-12

## 2022-04-12 PROBLEM — Z71.89 GOALS OF CARE, COUNSELING/DISCUSSION: Status: ACTIVE | Noted: 2022-04-12

## 2022-04-12 PROBLEM — D72.829 LEUKOCYTOSIS: Status: ACTIVE | Noted: 2022-04-12

## 2022-04-12 PROBLEM — E87.20 LACTIC ACIDOSIS: Status: ACTIVE | Noted: 2022-04-12

## 2022-04-12 LAB
ALLENS TEST: ABNORMAL
ALLENS TEST: ABNORMAL
ANION GAP SERPL CALC-SCNC: 11 MMOL/L (ref 8–16)
ANION GAP SERPL CALC-SCNC: 16 MMOL/L (ref 8–16)
ANION GAP SERPL CALC-SCNC: 30 MMOL/L (ref 8–16)
ANION GAP SERPL CALC-SCNC: 6 MMOL/L (ref 8–16)
ANION GAP SERPL CALC-SCNC: 8 MMOL/L (ref 8–16)
ANION GAP SERPL CALC-SCNC: ABNORMAL MMOL/L (ref 8–16)
BASOPHILS # BLD AUTO: 0.03 K/UL (ref 0–0.2)
BASOPHILS NFR BLD: 0.2 % (ref 0–1.9)
BUN SERPL-MCNC: 28 MG/DL (ref 8–23)
BUN SERPL-MCNC: 29 MG/DL (ref 8–23)
BUN SERPL-MCNC: 31 MG/DL (ref 8–23)
BUN SERPL-MCNC: 31 MG/DL (ref 8–23)
BUN SERPL-MCNC: 33 MG/DL (ref 8–23)
BUN SERPL-MCNC: 37 MG/DL (ref 8–23)
BUN SERPL-MCNC: 39 MG/DL (ref 6–30)
BUN SERPL-MCNC: 40 MG/DL (ref 8–23)
BUN SERPL-MCNC: 43 MG/DL (ref 8–23)
CALCIUM SERPL-MCNC: 8.3 MG/DL (ref 8.7–10.5)
CALCIUM SERPL-MCNC: 8.6 MG/DL (ref 8.7–10.5)
CALCIUM SERPL-MCNC: 8.7 MG/DL (ref 8.7–10.5)
CALCIUM SERPL-MCNC: 8.9 MG/DL (ref 8.7–10.5)
CALCIUM SERPL-MCNC: 9 MG/DL (ref 8.7–10.5)
CALCIUM SERPL-MCNC: 9 MG/DL (ref 8.7–10.5)
CALCIUM SERPL-MCNC: 9.1 MG/DL (ref 8.7–10.5)
CALCIUM SERPL-MCNC: 9.2 MG/DL (ref 8.7–10.5)
CHLORIDE SERPL-SCNC: 102 MMOL/L (ref 95–110)
CHLORIDE SERPL-SCNC: 104 MMOL/L (ref 95–110)
CHLORIDE SERPL-SCNC: 106 MMOL/L (ref 95–110)
CHLORIDE SERPL-SCNC: 107 MMOL/L (ref 95–110)
CHLORIDE SERPL-SCNC: 107 MMOL/L (ref 95–110)
CHLORIDE SERPL-SCNC: 108 MMOL/L (ref 95–110)
CHLORIDE SERPL-SCNC: 93 MMOL/L (ref 95–110)
CHLORIDE SERPL-SCNC: 96 MMOL/L (ref 95–110)
CHLORIDE SERPL-SCNC: 97 MMOL/L (ref 95–110)
CK MB SERPL-MCNC: 2.5 NG/ML (ref 0.1–6.5)
CK MB SERPL-RTO: 5.3 % (ref 0–5)
CK SERPL-CCNC: 47 U/L (ref 20–200)
CO2 SERPL-SCNC: 19 MMOL/L (ref 23–29)
CO2 SERPL-SCNC: 20 MMOL/L (ref 23–29)
CO2 SERPL-SCNC: 23 MMOL/L (ref 23–29)
CO2 SERPL-SCNC: 24 MMOL/L (ref 23–29)
CO2 SERPL-SCNC: 24 MMOL/L (ref 23–29)
CO2 SERPL-SCNC: 28 MMOL/L (ref 23–29)
CO2 SERPL-SCNC: 6 MMOL/L (ref 23–29)
CO2 SERPL-SCNC: <5 MMOL/L (ref 23–29)
CREAT SERPL-MCNC: 1.4 MG/DL (ref 0.5–1.4)
CREAT SERPL-MCNC: 1.4 MG/DL (ref 0.5–1.4)
CREAT SERPL-MCNC: 1.5 MG/DL (ref 0.5–1.4)
CREAT SERPL-MCNC: 1.6 MG/DL (ref 0.5–1.4)
CREAT SERPL-MCNC: 1.8 MG/DL (ref 0.5–1.4)
CREAT SERPL-MCNC: 1.9 MG/DL (ref 0.5–1.4)
CREAT SERPL-MCNC: 2.2 MG/DL (ref 0.5–1.4)
CREAT SERPL-MCNC: 2.3 MG/DL (ref 0.5–1.4)
CREAT SERPL-MCNC: 2.7 MG/DL (ref 0.5–1.4)
DELSYS: ABNORMAL
DELSYS: ABNORMAL
DIFFERENTIAL METHOD: ABNORMAL
EOSINOPHIL # BLD AUTO: 0 K/UL (ref 0–0.5)
EOSINOPHIL NFR BLD: 0.1 % (ref 0–8)
ERYTHROCYTE [DISTWIDTH] IN BLOOD BY AUTOMATED COUNT: 16.2 % (ref 11.5–14.5)
EST. GFR  (AFRICAN AMERICAN): 25 ML/MIN/1.73 M^2
EST. GFR  (AFRICAN AMERICAN): 30.3 ML/MIN/1.73 M^2
EST. GFR  (AFRICAN AMERICAN): 32 ML/MIN/1.73 M^2
EST. GFR  (AFRICAN AMERICAN): 38.2 ML/MIN/1.73 M^2
EST. GFR  (AFRICAN AMERICAN): 47 ML/MIN/1.73 M^2
EST. GFR  (AFRICAN AMERICAN): 50.8 ML/MIN/1.73 M^2
EST. GFR  (AFRICAN AMERICAN): 55.2 ML/MIN/1.73 M^2
EST. GFR  (AFRICAN AMERICAN): 55.2 ML/MIN/1.73 M^2
EST. GFR  (NON AFRICAN AMERICAN): 21.6 ML/MIN/1.73 M^2
EST. GFR  (NON AFRICAN AMERICAN): 26.2 ML/MIN/1.73 M^2
EST. GFR  (NON AFRICAN AMERICAN): 27.7 ML/MIN/1.73 M^2
EST. GFR  (NON AFRICAN AMERICAN): 33 ML/MIN/1.73 M^2
EST. GFR  (NON AFRICAN AMERICAN): 40.7 ML/MIN/1.73 M^2
EST. GFR  (NON AFRICAN AMERICAN): 44 ML/MIN/1.73 M^2
EST. GFR  (NON AFRICAN AMERICAN): 47.8 ML/MIN/1.73 M^2
EST. GFR  (NON AFRICAN AMERICAN): 47.8 ML/MIN/1.73 M^2
GLUCOSE SERPL-MCNC: 1047 MG/DL (ref 70–110)
GLUCOSE SERPL-MCNC: 111 MG/DL (ref 70–110)
GLUCOSE SERPL-MCNC: 136 MG/DL (ref 70–110)
GLUCOSE SERPL-MCNC: 152 MG/DL (ref 70–110)
GLUCOSE SERPL-MCNC: 405 MG/DL (ref 70–110)
GLUCOSE SERPL-MCNC: 612 MG/DL (ref 70–110)
GLUCOSE SERPL-MCNC: 87 MG/DL (ref 70–110)
GLUCOSE SERPL-MCNC: 891 MG/DL (ref 70–110)
GLUCOSE SERPL-MCNC: >700 MG/DL (ref 70–110)
HCO3 UR-SCNC: 4.1 MMOL/L (ref 24–28)
HCO3 UR-SCNC: 7.1 MMOL/L (ref 24–28)
HCT VFR BLD AUTO: 32.6 % (ref 40–54)
HCT VFR BLD CALC: 36 %PCV (ref 36–54)
HCT VFR BLD CALC: 38 %PCV (ref 36–54)
HCT VFR BLD CALC: 43 %PCV (ref 36–54)
HGB BLD-MCNC: 10.2 G/DL (ref 14–18)
IMM GRANULOCYTES # BLD AUTO: 0.18 K/UL (ref 0–0.04)
IMM GRANULOCYTES NFR BLD AUTO: 1.1 % (ref 0–0.5)
LACTATE SERPL-SCNC: 2 MMOL/L (ref 0.5–2.2)
LACTATE SERPL-SCNC: 3.2 MMOL/L (ref 0.5–2.2)
LACTATE SERPL-SCNC: 4.2 MMOL/L (ref 0.5–2.2)
LACTATE SERPL-SCNC: 8.2 MMOL/L (ref 0.5–2.2)
LIPASE SERPL-CCNC: 18 U/L (ref 4–60)
LYMPHOCYTES # BLD AUTO: 1.5 K/UL (ref 1–4.8)
LYMPHOCYTES NFR BLD: 9.3 % (ref 18–48)
MAGNESIUM SERPL-MCNC: 1.7 MG/DL (ref 1.6–2.6)
MCH RBC QN AUTO: 28.9 PG (ref 27–31)
MCHC RBC AUTO-ENTMCNC: 31.3 G/DL (ref 32–36)
MCV RBC AUTO: 92 FL (ref 82–98)
MODE: ABNORMAL
MONOCYTES # BLD AUTO: 0.8 K/UL (ref 0.3–1)
MONOCYTES NFR BLD: 5.2 % (ref 4–15)
NEUTROPHILS # BLD AUTO: 13.6 K/UL (ref 1.8–7.7)
NEUTROPHILS NFR BLD: 84.1 % (ref 38–73)
NRBC BLD-RTO: 0 /100 WBC
PCO2 BLDA: 14.3 MMHG (ref 35–45)
PCO2 BLDA: 17.4 MMHG (ref 35–45)
PH SMN: 7.06 [PH] (ref 7.35–7.45)
PH SMN: 7.22 [PH] (ref 7.35–7.45)
PHOSPHATE SERPL-MCNC: 2.8 MG/DL (ref 2.7–4.5)
PLATELET # BLD AUTO: 227 K/UL (ref 150–450)
PMV BLD AUTO: 10.4 FL (ref 9.2–12.9)
PO2 BLDA: 59 MMHG (ref 40–60)
PO2 BLDA: 84 MMHG (ref 40–60)
POC BE: -21 MMOL/L
POC BE: -26 MMOL/L
POC IONIZED CALCIUM: 1.08 MMOL/L (ref 1.06–1.42)
POC IONIZED CALCIUM: 1.09 MMOL/L (ref 1.06–1.42)
POC IONIZED CALCIUM: 1.25 MMOL/L (ref 1.06–1.42)
POC SATURATED O2: 86 % (ref 95–100)
POC SATURATED O2: 91 % (ref 95–100)
POC TCO2 (MEASURED): 9 MMOL/L (ref 23–29)
POC TCO2: 8 MMOL/L (ref 24–29)
POC TCO2: <5 MMOL/L (ref 24–29)
POCT GLUCOSE: 100 MG/DL (ref 70–110)
POCT GLUCOSE: 130 MG/DL (ref 70–110)
POCT GLUCOSE: 155 MG/DL (ref 70–110)
POCT GLUCOSE: 183 MG/DL (ref 70–110)
POCT GLUCOSE: 214 MG/DL (ref 70–110)
POCT GLUCOSE: 292 MG/DL (ref 70–110)
POCT GLUCOSE: 94 MG/DL (ref 70–110)
POCT GLUCOSE: >500 MG/DL (ref 70–110)
POTASSIUM BLD-SCNC: 5.2 MMOL/L (ref 3.5–5.1)
POTASSIUM BLD-SCNC: 6.5 MMOL/L (ref 3.5–5.1)
POTASSIUM BLD-SCNC: 6.7 MMOL/L (ref 3.5–5.1)
POTASSIUM SERPL-SCNC: 3.4 MMOL/L (ref 3.5–5.1)
POTASSIUM SERPL-SCNC: 3.7 MMOL/L (ref 3.5–5.1)
POTASSIUM SERPL-SCNC: 3.8 MMOL/L (ref 3.5–5.1)
POTASSIUM SERPL-SCNC: 3.9 MMOL/L (ref 3.5–5.1)
POTASSIUM SERPL-SCNC: 5 MMOL/L (ref 3.5–5.1)
POTASSIUM SERPL-SCNC: 5.2 MMOL/L (ref 3.5–5.1)
POTASSIUM SERPL-SCNC: 6.9 MMOL/L (ref 3.5–5.1)
POTASSIUM SERPL-SCNC: 7.9 MMOL/L (ref 3.5–5.1)
PROCALCITONIN SERPL IA-MCNC: 1.18 NG/ML
RBC # BLD AUTO: 3.53 M/UL (ref 4.6–6.2)
SAMPLE: ABNORMAL
SITE: ABNORMAL
SITE: ABNORMAL
SODIUM BLD-SCNC: 129 MMOL/L (ref 136–145)
SODIUM BLD-SCNC: 130 MMOL/L (ref 136–145)
SODIUM BLD-SCNC: 133 MMOL/L (ref 136–145)
SODIUM SERPL-SCNC: 132 MMOL/L (ref 136–145)
SODIUM SERPL-SCNC: 135 MMOL/L (ref 136–145)
SODIUM SERPL-SCNC: 137 MMOL/L (ref 136–145)
SODIUM SERPL-SCNC: 139 MMOL/L (ref 136–145)
SODIUM SERPL-SCNC: 140 MMOL/L (ref 136–145)
TROPONIN I SERPL DL<=0.01 NG/ML-MCNC: 0.06 NG/ML (ref 0–0.03)
WBC # BLD AUTO: 16.18 K/UL (ref 3.9–12.7)

## 2022-04-12 PROCEDURE — 99291 PR CRITICAL CARE, E/M 30-74 MINUTES: ICD-10-PCS | Mod: ,,,

## 2022-04-12 PROCEDURE — 20000000 HC ICU ROOM

## 2022-04-12 PROCEDURE — 99900035 HC TECH TIME PER 15 MIN (STAT)

## 2022-04-12 PROCEDURE — 82803 BLOOD GASES ANY COMBINATION: CPT

## 2022-04-12 PROCEDURE — 63600175 PHARM REV CODE 636 W HCPCS

## 2022-04-12 PROCEDURE — 25000003 PHARM REV CODE 250

## 2022-04-12 PROCEDURE — 85014 HEMATOCRIT: CPT

## 2022-04-12 PROCEDURE — 93005 ELECTROCARDIOGRAM TRACING: CPT

## 2022-04-12 PROCEDURE — C9399 UNCLASSIFIED DRUGS OR BIOLOG: HCPCS | Performed by: NURSE PRACTITIONER

## 2022-04-12 PROCEDURE — 85025 COMPLETE CBC W/AUTO DIFF WBC: CPT

## 2022-04-12 PROCEDURE — 63600175 PHARM REV CODE 636 W HCPCS: Performed by: NURSE PRACTITIONER

## 2022-04-12 PROCEDURE — 80048 BASIC METABOLIC PNL TOTAL CA: CPT

## 2022-04-12 PROCEDURE — 25000003 PHARM REV CODE 250: Performed by: NURSE PRACTITIONER

## 2022-04-12 PROCEDURE — 99291 CRITICAL CARE FIRST HOUR: CPT | Mod: ,,,

## 2022-04-12 PROCEDURE — S0030 INJECTION, METRONIDAZOLE: HCPCS

## 2022-04-12 PROCEDURE — 83605 ASSAY OF LACTIC ACID: CPT

## 2022-04-12 PROCEDURE — 93010 EKG 12-LEAD: ICD-10-PCS | Mod: ,,, | Performed by: INTERNAL MEDICINE

## 2022-04-12 PROCEDURE — 80048 BASIC METABOLIC PNL TOTAL CA: CPT | Mod: 91 | Performed by: INTERNAL MEDICINE

## 2022-04-12 PROCEDURE — 84100 ASSAY OF PHOSPHORUS: CPT

## 2022-04-12 PROCEDURE — 87040 BLOOD CULTURE FOR BACTERIA: CPT

## 2022-04-12 PROCEDURE — S5010 5% DEXTROSE AND 0.45% SALINE: HCPCS | Performed by: NURSE PRACTITIONER

## 2022-04-12 PROCEDURE — 83735 ASSAY OF MAGNESIUM: CPT

## 2022-04-12 PROCEDURE — 80048 BASIC METABOLIC PNL TOTAL CA: CPT | Mod: 91 | Performed by: NURSE PRACTITIONER

## 2022-04-12 PROCEDURE — 82330 ASSAY OF CALCIUM: CPT

## 2022-04-12 PROCEDURE — 84295 ASSAY OF SERUM SODIUM: CPT

## 2022-04-12 PROCEDURE — 93010 ELECTROCARDIOGRAM REPORT: CPT | Mod: ,,, | Performed by: INTERNAL MEDICINE

## 2022-04-12 PROCEDURE — 84145 PROCALCITONIN (PCT): CPT

## 2022-04-12 PROCEDURE — 82553 CREATINE MB FRACTION: CPT

## 2022-04-12 PROCEDURE — 94761 N-INVAS EAR/PLS OXIMETRY MLT: CPT

## 2022-04-12 PROCEDURE — 84484 ASSAY OF TROPONIN QUANT: CPT

## 2022-04-12 PROCEDURE — 84132 ASSAY OF SERUM POTASSIUM: CPT

## 2022-04-12 RX ORDER — SUCRALFATE 1 G/1
1 TABLET ORAL
Status: DISCONTINUED | OUTPATIENT
Start: 2022-04-12 | End: 2022-04-12

## 2022-04-12 RX ORDER — CLOPIDOGREL BISULFATE 75 MG/1
75 TABLET ORAL DAILY
Status: DISCONTINUED | OUTPATIENT
Start: 2022-04-12 | End: 2022-05-03 | Stop reason: HOSPADM

## 2022-04-12 RX ORDER — AMIODARONE HYDROCHLORIDE 200 MG/1
200 TABLET ORAL DAILY
Status: DISCONTINUED | OUTPATIENT
Start: 2022-04-12 | End: 2022-05-03 | Stop reason: HOSPADM

## 2022-04-12 RX ORDER — ASPIRIN 81 MG/1
81 TABLET ORAL DAILY
Status: DISCONTINUED | OUTPATIENT
Start: 2022-04-12 | End: 2022-04-13

## 2022-04-12 RX ORDER — DEXTROSE MONOHYDRATE AND SODIUM CHLORIDE 5; .45 G/100ML; G/100ML
125 INJECTION, SOLUTION INTRAVENOUS CONTINUOUS PRN
Status: DISCONTINUED | OUTPATIENT
Start: 2022-04-12 | End: 2022-04-12

## 2022-04-12 RX ORDER — IBUPROFEN 200 MG
16 TABLET ORAL
Status: DISCONTINUED | OUTPATIENT
Start: 2022-04-12 | End: 2022-04-12

## 2022-04-12 RX ORDER — METRONIDAZOLE 500 MG/1
500 TABLET ORAL EVERY 8 HOURS
Status: DISCONTINUED | OUTPATIENT
Start: 2022-04-12 | End: 2022-04-12

## 2022-04-12 RX ORDER — LIDOCAINE HYDROCHLORIDE 20 MG/ML
10 SOLUTION OROPHARYNGEAL ONCE
Status: COMPLETED | OUTPATIENT
Start: 2022-04-12 | End: 2022-04-12

## 2022-04-12 RX ORDER — MAG HYDROX/ALUMINUM HYD/SIMETH 200-200-20
30 SUSPENSION, ORAL (FINAL DOSE FORM) ORAL ONCE
Status: COMPLETED | OUTPATIENT
Start: 2022-04-12 | End: 2022-04-12

## 2022-04-12 RX ORDER — PANTOPRAZOLE SODIUM 40 MG/1
40 TABLET, DELAYED RELEASE ORAL DAILY
Status: DISCONTINUED | OUTPATIENT
Start: 2022-04-12 | End: 2022-05-03 | Stop reason: HOSPADM

## 2022-04-12 RX ORDER — GLUCAGON 1 MG
1 KIT INJECTION
Status: DISCONTINUED | OUTPATIENT
Start: 2022-04-12 | End: 2022-04-12

## 2022-04-12 RX ORDER — ACETAMINOPHEN 325 MG/1
650 TABLET ORAL EVERY 4 HOURS PRN
Status: DISCONTINUED | OUTPATIENT
Start: 2022-04-12 | End: 2022-04-14

## 2022-04-12 RX ORDER — ONDANSETRON 2 MG/ML
4 INJECTION INTRAMUSCULAR; INTRAVENOUS EVERY 6 HOURS PRN
Status: DISCONTINUED | OUTPATIENT
Start: 2022-04-12 | End: 2022-04-14

## 2022-04-12 RX ORDER — SODIUM CHLORIDE 0.9 % (FLUSH) 0.9 %
10 SYRINGE (ML) INJECTION
Status: DISCONTINUED | OUTPATIENT
Start: 2022-04-12 | End: 2022-04-28

## 2022-04-12 RX ORDER — ATORVASTATIN CALCIUM 20 MG/1
40 TABLET, FILM COATED ORAL DAILY
Status: DISCONTINUED | OUTPATIENT
Start: 2022-04-12 | End: 2022-05-03 | Stop reason: HOSPADM

## 2022-04-12 RX ORDER — INSULIN ASPART 100 [IU]/ML
0-5 INJECTION, SOLUTION INTRAVENOUS; SUBCUTANEOUS
Status: DISCONTINUED | OUTPATIENT
Start: 2022-04-12 | End: 2022-04-12

## 2022-04-12 RX ORDER — METRONIDAZOLE 500 MG/100ML
500 INJECTION, SOLUTION INTRAVENOUS
Status: DISCONTINUED | OUTPATIENT
Start: 2022-04-12 | End: 2022-04-12

## 2022-04-12 RX ORDER — POTASSIUM CHLORIDE 7.45 MG/ML
10 INJECTION INTRAVENOUS
Status: DISPENSED | OUTPATIENT
Start: 2022-04-12 | End: 2022-04-12

## 2022-04-12 RX ORDER — IBUPROFEN 200 MG
24 TABLET ORAL
Status: DISCONTINUED | OUTPATIENT
Start: 2022-04-12 | End: 2022-04-12

## 2022-04-12 RX ORDER — INSULIN ASPART 100 [IU]/ML
8 INJECTION, SOLUTION INTRAVENOUS; SUBCUTANEOUS
Status: DISCONTINUED | OUTPATIENT
Start: 2022-04-12 | End: 2022-04-13

## 2022-04-12 RX ORDER — SODIUM CHLORIDE 9 MG/ML
125 INJECTION, SOLUTION INTRAVENOUS CONTINUOUS
Status: DISCONTINUED | OUTPATIENT
Start: 2022-04-12 | End: 2022-04-12

## 2022-04-12 RX ORDER — DEXTROSE MONOHYDRATE AND SODIUM CHLORIDE 5; .45 G/100ML; G/100ML
INJECTION, SOLUTION INTRAVENOUS CONTINUOUS
Status: DISCONTINUED | OUTPATIENT
Start: 2022-04-12 | End: 2022-04-13

## 2022-04-12 RX ORDER — LACOSAMIDE 100 MG/1
100 TABLET ORAL EVERY 12 HOURS
Status: DISCONTINUED | OUTPATIENT
Start: 2022-04-12 | End: 2022-05-03 | Stop reason: HOSPADM

## 2022-04-12 RX ADMIN — AMIODARONE HYDROCHLORIDE 200 MG: 200 TABLET ORAL at 08:04

## 2022-04-12 RX ADMIN — INSULIN HUMAN 11 UNITS/HR: 1 INJECTION, SOLUTION INTRAVENOUS at 09:04

## 2022-04-12 RX ADMIN — METRONIDAZOLE 500 MG: 500 INJECTION, SOLUTION INTRAVENOUS at 07:04

## 2022-04-12 RX ADMIN — LACOSAMIDE 100 MG: 100 TABLET, FILM COATED ORAL at 08:04

## 2022-04-12 RX ADMIN — VANCOMYCIN HYDROCHLORIDE 1750 MG: 10 INJECTION, POWDER, LYOPHILIZED, FOR SOLUTION INTRAVENOUS at 01:04

## 2022-04-12 RX ADMIN — POTASSIUM BICARBONATE 25 MEQ: 978 TABLET, EFFERVESCENT ORAL at 11:04

## 2022-04-12 RX ADMIN — METRONIDAZOLE 500 MG: 500 TABLET ORAL at 12:04

## 2022-04-12 RX ADMIN — ONDANSETRON 4 MG: 2 INJECTION INTRAMUSCULAR; INTRAVENOUS at 03:04

## 2022-04-12 RX ADMIN — LIDOCAINE HYDROCHLORIDE 10 ML: 20 SOLUTION ORAL; TOPICAL at 06:04

## 2022-04-12 RX ADMIN — DEXTROSE AND SODIUM CHLORIDE 125 ML/HR: 5; .45 INJECTION, SOLUTION INTRAVENOUS at 02:04

## 2022-04-12 RX ADMIN — INSULIN DETEMIR 8 UNITS: 100 INJECTION, SOLUTION SUBCUTANEOUS at 03:04

## 2022-04-12 RX ADMIN — ALUMINUM HYDROXIDE, MAGNESIUM HYDROXIDE, DIMETHICONE 30 ML: 200; 200; 20 SUSPENSION ORAL at 06:04

## 2022-04-12 RX ADMIN — DEXTROSE AND SODIUM CHLORIDE: 5; .45 INJECTION, SOLUTION INTRAVENOUS at 05:04

## 2022-04-12 RX ADMIN — APIXABAN 5 MG: 5 TABLET, FILM COATED ORAL at 09:04

## 2022-04-12 RX ADMIN — SODIUM CHLORIDE, SODIUM LACTATE, POTASSIUM CHLORIDE, AND CALCIUM CHLORIDE: .6; .31; .03; .02 INJECTION, SOLUTION INTRAVENOUS at 08:04

## 2022-04-12 RX ADMIN — APIXABAN 5 MG: 5 TABLET, FILM COATED ORAL at 08:04

## 2022-04-12 RX ADMIN — CEFEPIME HYDROCHLORIDE 1 G: 1 INJECTION, SOLUTION INTRAVENOUS at 02:04

## 2022-04-12 RX ADMIN — POTASSIUM CHLORIDE 10 MEQ: 10 INJECTION, SOLUTION INTRAVENOUS at 09:04

## 2022-04-12 RX ADMIN — SODIUM CHLORIDE, SODIUM LACTATE, POTASSIUM CHLORIDE, AND CALCIUM CHLORIDE: .6; .31; .03; .02 INJECTION, SOLUTION INTRAVENOUS at 12:04

## 2022-04-12 RX ADMIN — POTASSIUM CHLORIDE 10 MEQ: 10 INJECTION, SOLUTION INTRAVENOUS at 11:04

## 2022-04-12 RX ADMIN — PANTOPRAZOLE SODIUM 40 MG: 40 TABLET, DELAYED RELEASE ORAL at 08:04

## 2022-04-12 RX ADMIN — INSULIN HUMAN 8.16 UNITS: 100 INJECTION, SOLUTION PARENTERAL at 01:04

## 2022-04-12 RX ADMIN — ATORVASTATIN CALCIUM 40 MG: 20 TABLET, FILM COATED ORAL at 08:04

## 2022-04-12 RX ADMIN — INSULIN HUMAN 21 UNITS/HR: 1 INJECTION, SOLUTION INTRAVENOUS at 05:04

## 2022-04-12 RX ADMIN — LACOSAMIDE 100 MG: 100 TABLET, FILM COATED ORAL at 09:04

## 2022-04-12 RX ADMIN — CLOPIDOGREL 75 MG: 75 TABLET, FILM COATED ORAL at 08:04

## 2022-04-12 RX ADMIN — ASPIRIN 81 MG: 81 TABLET, COATED ORAL at 08:04

## 2022-04-12 NOTE — SUBJECTIVE & OBJECTIVE
Past Medical History:   Diagnosis Date    Arthritis     Coronary artery disease     COVID-19 virus infection 2022    Diabetes mellitus type II     Embolic stroke involving left cerebellar artery 2022    Hyperlipidemia     Hypertension     Kidney stone     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 2012    STEMI involving right coronary artery 2022    Type II or unspecified type diabetes mellitus with neurological manifestations, uncontrolled(250.62) 3/8/2013    Urinary tract infection        Past Surgical History:   Procedure Laterality Date    BACK SURGERY      CATARACT EXTRACTION W/  INTRAOCULAR LENS IMPLANT Right     Per Dr Romero note 2018    COLONOSCOPY N/A 2019    Procedure: COLONOSCOPY Suprep;  Surgeon: Anh Johnson MD;  Location: [facility] ENDO;  Service: Endoscopy;  Laterality: N/A;    EYE SURGERY      HERNIA REPAIR      LEFT HEART CATHETERIZATION Left 2022    Procedure: CATHETERIZATION, HEART, LEFT;  Surgeon: Will Hurst III, MD;  Location: [facility] CATH LAB/EP;  Service: Cardiology;  Laterality: Left;    renal stones      SHOULDER OPEN ROTATOR CUFF REPAIR         Review of patient's allergies indicates:   Allergen Reactions    Iodine      Other reaction(s): swelling  Other reaction(s): Itching  Other reaction(s): Rash       Family History       Problem Relation (Age of Onset)    Diabetes Father          Tobacco Use    Smoking status: Former Smoker     Packs/day: 1.50     Years: 25.00     Pack years: 37.50     Quit date: 1983     Years since quittin.3    Smokeless tobacco: Never Used   Substance and Sexual Activity    Alcohol use: No    Drug use: No    Sexual activity: Yes     Partners: Female      Review of Systems   Unable to perform ROS: Mental status change   Objective:     Vital Signs (Most Recent):  Temp: 98.2 °F (36.8 °C) (22)  Pulse: (!) 120 (22)  Resp: (!) 32 (22)  BP: 94/68 (22)  SpO2: 100 % (22)   Vital  Signs (24h Range):  Temp:  [98.2 °F (36.8 °C)] 98.2 °F (36.8 °C)  Pulse:  [104-120] 120  Resp:  [18-32] 32  SpO2:  [98 %-100 %] 100 %  BP: ()/(46-68) 94/68   Weight: 81.6 kg (180 lb)  Body mass index is 25.1 kg/m².      Intake/Output Summary (Last 24 hours) at 4/12/2022 0031  Last data filed at 4/11/2022 2359  Gross per 24 hour   Intake 1100 ml   Output --   Net 1100 ml       Physical Exam  Vitals and nursing note reviewed.   Constitutional:       General: He is not in acute distress.     Appearance: He is normal weight. He is ill-appearing.   HENT:      Head: Normocephalic and atraumatic.      Right Ear: External ear normal.      Left Ear: External ear normal.      Nose: Nose normal.      Mouth/Throat:      Mouth: Mucous membranes are dry.   Eyes:      General: No scleral icterus.     Extraocular Movements: Extraocular movements intact.      Pupils: Pupils are equal, round, and reactive to light.   Cardiovascular:      Rate and Rhythm: Tachycardia present.      Pulses: Normal pulses.      Heart sounds: Normal heart sounds. No murmur heard.  Pulmonary:      Effort: Pulmonary effort is normal. No respiratory distress.      Breath sounds: Normal breath sounds. No wheezing.   Abdominal:      General: Abdomen is flat. Bowel sounds are normal. There is no distension.      Palpations: Abdomen is soft.      Tenderness: There is no abdominal tenderness.   Genitourinary:     Comments: Condom cath  Musculoskeletal:      Cervical back: Normal range of motion and neck supple.      Right lower leg: No edema.      Left lower leg: No edema.   Skin:     General: Skin is warm and dry.      Capillary Refill: Capillary refill takes less than 2 seconds.      Coloration: Skin is pale.   Neurological:      Mental Status: He is alert. He is disoriented and confused.      GCS: GCS eye subscore is 4. GCS verbal subscore is 3. GCS motor subscore is 5.       Vents:     Lines/Drains/Airways       Drain  Duration             Male External  Urinary Catheter 04/11/22 2223 Medium <1 day              Peripheral Intravenous Line  Duration                  Peripheral IV - Single Lumen 04/11/22 2111 18 G Left Antecubital <1 day         Peripheral IV - Single Lumen 04/11/22 2337 22 G Right Hand <1 day         Peripheral IV - Single Lumen 04/11/22 2338 20 G Right Antecubital <1 day                  Significant Labs:    CBC/Anemia Profile:  Recent Labs   Lab 04/10/22  0333 04/11/22 2133   WBC 6.82 17.05*   HGB 12.3* 12.1*   HCT 38.1* 42.0    306   MCV 91 99*   RDW 16.2* 15.7*        Chemistries:  Recent Labs   Lab 04/10/22  0333 04/11/22 2133    132*   K 4.5 7.0*    91*   CO2 24 6*   BUN 24* 42*   CREATININE 1.0 2.3*   CALCIUM 8.3* 9.3   ALBUMIN 2.5* 3.0*   PROT 5.9* 7.4   BILITOT 0.4 0.4   ALKPHOS 133 151*   ALT 39 25   AST 63* 20   MG 1.6  --    PHOS 3.0  --        All pertinent labs within the past 24 hours have been reviewed.    Significant Imaging: I have reviewed all pertinent imaging results/findings within the past 24 hours.

## 2022-04-12 NOTE — HOSPITAL COURSE
Admitted to MICU on 4/11 for DKA with BG >1000, pCO2 6, AG 35, and hyperkalemia.  Given Calcium gluconate and started on insulin gtt in ED.  Hyperkalemia not improved on repeat labs and bolused with IV insulin.  Infectious workup sent and broad spectrum abx started.  4/12 potassium improving with insulin.  Trending BMPs Q2H.  4/13 Gap closed, insulin gtt turned off yesterday afternoon. Pt remains on D5 1/2 NS due to poor PO intake. Pt is complaining of abdominal pain. Abdominal ultrasound showed gallbladder sludge but no acute process. Will get CT scan of the abdomen today.   4/14:  No acute events noted overnight.  Patient tolerating full liquid diet.  CT-abd/pelvis without acute abnormalities.  Capillary glucose is stable

## 2022-04-12 NOTE — PLAN OF CARE
CMICU DAILY GOALS       A: Awake    RASS: Goal -    Actual -     Restraint necessity:    B: Breathe   SBT: Not intubated   C: Coordinate A & B, analgesics/sedatives   Pain: managed    SAT: Not intubated  D: Delirium   CAM-ICU: Overall CAM-ICU: Negative  E: Early(intubated/ Progressive (non-intubated) Mobility   MOVE Screen: Pass   Activity: Activity Management: Rolling - L1  FAS: Feeding/Nutrition   Diet order: Diet/Nutrition Received: NPO,    T: Thrombus   DVT prophylaxis: VTE Required Core Measure: Pharmacological prophylaxis initiated/maintained  H: HOB Elevation   Head of Bed (HOB) Positioning: HOB at 30-45 degrees  U: Ulcer Prophylaxis   GI: yes  G: Glucose control   managed Glycemic Management: blood glucose monitored, insulin infusion adjusted  S: Skin   Bathing/Skin Care: bath, complete, dressed/undressed, linen changed, electrode patches/site rotation  Device Skin Pressure Protection: absorbent pad utilized/changed, adhesive use limited, positioning supports utilized, pressure points protected, skin-to-device areas padded, skin-to-skin areas padded  Pressure Reduction Devices: foam padding utilized, heel offloading device utilized, positioning supports utilized  Pressure Reduction Techniques: heels elevated off bed, positioned off wounds, pressure points protected  Skin Protection: adhesive use limited, incontinence pads utilized, transparent dressing maintained, tubing/devices free from skin contact  B: Bowel Function   no issues   I: Indwelling Catheters   Mccord necessity:     CVC necessity: No  D: De-escalation Antibiotics   No    Family/Goals of care/Code Status   Code Status: DNR    24H Vital Sign Range  Temp:  [97.5 °F (36.4 °C)-98.9 °F (37.2 °C)]   Pulse:  []   Resp:  [14-32]   BP: (100-148)/(46-80)   SpO2:  [94 %-100 %]      Shift Events   No acute events throughout shift, insulin drip d/c'd; LR infusion d/c'd and D5 1/2 NS 125mL/hr started; zofran given for nausea; US of abdomen completed;  diabetic diet put in; vitals stable throughout shift; WCTM.    VS and assessment per flow sheet, patient progressing towards goals as tolerated, plan of care reviewed with  patient and family , all concerns addressed, will continue to monitor.    Sanjuana Bazan

## 2022-04-12 NOTE — HPI
. Kamar Muñoz is a 76 y.o. male with a past medical history of HTN, Type 2 DM, CAD, STEMI, HLD, paroxysmal Afib, CVA, seizures and recurrent DKA.  He presented to [facility] ED on 4/11 with elevated blood glucose.  He was discharged from [facility] on 4/10 after being admitted for DKA on 4/5.  At time of exam patient is alert but altered and unable to provide any history.  Spoke with patient's daughter who states she is a primary caregiver at home and obtained history as well as review of chart.  Per family he was not feeling well after discharge to home and vomited several times on 4/11. Unknown characteristics of emesis. Finger stick at home read High with unreadable blood glucose.  At this time daughter gave him 14 units of insulin and sublingual zofran. Other than malaise and nausea patient was not exhibiting any other signs/symptoms at home.  In ER BG found to be 1015 with pCO2 6 and anion gap 35.  Hyperkalemic with K 7.0 and some EKG changes when compared to previous EKG.  Given calcium gluconate, and started on insulin gtt as well as subQ long acting insulin.     Critical Care Medicine consulted for DKA and admitted to MICU.

## 2022-04-12 NOTE — ASSESSMENT & PLAN NOTE
Alert/confused on admission. Likely secondary to DKA.   Mental status improving, drowsy but oriented x3 and able to follow commands.      -- CT head completed in ED with no acute changes/process

## 2022-04-12 NOTE — ED NOTES
I-STAT Chem-8+ Results:   Value Reference Range   Sodium 129 136-145 mmol/L   Potassium  6.7 3.5-5.1 mmol/L   Chloride 97  mmol/L   Ionized Calcium 1.09 1.06-1.42 mmol/L   CO2 (measured) 9 23-29 mmol/L   Glucose >700  mg/dL   BUN 39 6-30 mg/dL   Creatinine 1.8 0.5-1.4 mg/dL   Hematocrit 43 36-54%

## 2022-04-12 NOTE — CONSULTS
Consult received. Patient to be admitted to the MICU. Full H&P to follow.    Yara Rodarte NP  Critical Care Medicine  4/11/2022   11:58 PM

## 2022-04-12 NOTE — ED PROVIDER NOTES
Encounter Date: 4/11/2022       History     Chief Complaint   Patient presents with    Altered Mental Status     Mr. 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 Pt is quite alteredwhile bedside, however is able to provide some clinical history. Was discharged on 04/10 from [facility] after being admitted with DKA. States that he's been nauseous since discharge and has 3 episodes of emesis. He reports 2 episodes of bloody emesis, but does not remember the time/volume of emesis. States that he took 26 units of insulin earlier this morning but has not been able to eat. Additionally, reports increased fatigue and weakness. Denies any respiratory distress, chest pain, SOB, palpitations, fever, chills, abdominal pain, or diarrhea. Pt's wife and daughter were called for collateral, however was unable to reach.         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 8/2/2012    STEMI involving right coronary artery 1/9/2022    Type II or unspecified type diabetes mellitus with neurological manifestations, uncontrolled(250.62) 3/8/2013    Urinary tract infection      Past Surgical History:   Procedure Laterality Date    BACK SURGERY      CATARACT EXTRACTION W/  INTRAOCULAR LENS IMPLANT Right     Per Dr Romero note 11/2018    COLONOSCOPY N/A 1/28/2019    Procedure: COLONOSCOPY Suprep;  Surgeon: Anh Johnson MD;   Location: [facility] ENDO;  Service: Endoscopy;  Laterality: N/A;    EYE SURGERY      HERNIA REPAIR      LEFT HEART CATHETERIZATION Left 2022    Procedure: CATHETERIZATION, HEART, LEFT;  Surgeon: Will Hurst III, MD;  Location: [facility] CATH LAB/EP;  Service: Cardiology;  Laterality: Left;    renal stones      SHOULDER OPEN ROTATOR CUFF REPAIR       Family History   Problem Relation Age of Onset    Diabetes Father     Prostate cancer Neg Hx     Kidney disease Neg Hx      Social History     Tobacco Use    Smoking status: Former Smoker     Packs/day: 1.50     Years: 25.00     Pack years: 37.50     Quit date: 1983     Years since quittin.3    Smokeless tobacco: Never Used   Substance Use Topics    Alcohol use: No    Drug use: No     Review of Systems   Unable to perform ROS: Acuity of condition   Constitutional: Positive for activity change and fatigue. Negative for fever.   HENT: Negative for congestion.    Respiratory: Positive for shortness of breath. Negative for choking and wheezing.    Cardiovascular: Negative for chest pain.   Gastrointestinal: Positive for abdominal pain. Negative for abdominal distention.   Neurological: Positive for weakness.       Physical Exam     Initial Vitals [22]   BP Pulse Resp Temp SpO2   104/67 104 18 98.2 °F (36.8 °C) 98 %      MAP       --         Physical Exam    Constitutional: He appears lethargic. He has a sickly appearance.   HENT:   Head: Normocephalic and atraumatic.   Eyes: Conjunctivae are normal. Pupils are equal, round, and reactive to light.   Cardiovascular: Regular rhythm, normal heart sounds and normal pulses. Tachycardia present.  PMI is not displaced.  Exam reveals no decreased pulses.    Pulmonary/Chest: Effort normal and breath sounds normal. No respiratory distress. He has no decreased breath sounds. He has no wheezes.   Abdominal: Abdomen is soft and flat. Bowel sounds are normal. There is abdominal tenderness in the epigastric  area and periumbilical area.   No right CVA tenderness.  No left CVA tenderness.     Neurological: He appears lethargic. He is disoriented.   AAOx2    Skin: Skin is warm. Capillary refill takes less than 2 seconds.         ED Course   Procedures  Labs Reviewed   CBC W/ AUTO DIFFERENTIAL - Abnormal; Notable for the following components:       Result Value    WBC 17.05 (*)     RBC 4.24 (*)     Hemoglobin 12.1 (*)     MCV 99 (*)     MCHC 28.8 (*)     RDW 15.7 (*)     Immature Granulocytes 1.6 (*)     Gran # (ANC) 13.2 (*)     Immature Grans (Abs) 0.27 (*)     Mono # 1.2 (*)     Gran % 77.2 (*)     Lymph % 13.3 (*)     All other components within normal limits   BETA - HYDROXYBUTYRATE, SERUM - Abnormal; Notable for the following components:    Beta-Hydroxybutyrate 5.2 (*)     All other components within normal limits   POCT GLUCOSE - Abnormal; Notable for the following components:    POCT Glucose >500 (*)     All other components within normal limits   TROPONIN I   COMPREHENSIVE METABOLIC PANEL   URINALYSIS, REFLEX TO URINE CULTURE   LACTIC ACID, PLASMA   POCT GLUCOSE MONITORING CONTINUOUS   POCT GLUCOSE MONITORING CONTINUOUS     EKG Readings: (Independently Interpreted)   Initial Reading: No STEMI.   Concern for wide QRS complexes and right bundle branch block. No STEMI noted.        Imaging Results          X-Ray Chest AP Portable (In process)                  Medications   sodium chloride 0.9% flush 10 mL (has no administration in time range)   dextrose 10 % infusion (has no administration in time range)   glucagon (human recombinant) injection 1 mg (has no administration in time range)   insulin regular in 0.9 % NaCl 100 unit/100 mL (1 unit/mL) infusion (has no administration in time range)   dextrose 10% bolus 125 mL (has no administration in time range)   calcium gluconate 2 g in dextrose 5 % 100 mL IVPB (has no administration in time range)   sodium chloride 0.9% bolus 1,000 mL (0 mLs Intravenous Stopped 4/11/22  2208)   insulin detemir U-100 pen 20 Units (20 Units Subcutaneous Given 4/11/22 2206)     Medical Decision Making:   Initial Assessment:   Mr. 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 Pt is quite somnolent while bedside, however is able to provide some clinical history. [place] [facility] [place] with [place].  Differential Diagnosis:   DKA, HHS, peptic ulcer, URI, dehydration  ED Management:  Pt was noted to arrive with BG >500. ISTAT performed showed K 6.7 and BG >700. DKA protocol was started- CBC, CMP, beta hydroxybutrate, EKG were ordered. Serum ketones elev and WBC elev to 17.04 on CBC. Sub Q 20 units insulin was given. EKG showed wide QRS complexes and right bundle branch block; in combination with hyperkalemia, gave IV calcium. Insulin ggt to be started. Patient to be admitted inpatient.                       Clinical Impression:   Final diagnoses:  [R73.9] Hyperglycemia  [E13.10] Diabetic ketosis (Primary)  [E87.2] Metabolic acidosis          ED Disposition Condition    Admit               Britney Mcintosh MD  Resident  04/11/22 2961

## 2022-04-12 NOTE — SUBJECTIVE & OBJECTIVE
Interval History/Significant Events: Admitted to MICU overnight for DKA.  Hyperkalemia resolving, BG improving on insulin gtt with appropriate correction.      Review of Systems   Unable to perform ROS: Mental status change   Objective:     Vital Signs (Most Recent):  Temp: 97.5 °F (36.4 °C) (04/12/22 0115)  Pulse: 90 (04/12/22 0400)  Resp: 17 (04/12/22 0400)  BP: (!) 112/53 (04/12/22 0400)  SpO2: 97 % (04/12/22 0400)   Vital Signs (24h Range):  Temp:  [97.5 °F (36.4 °C)-98.2 °F (36.8 °C)] 97.5 °F (36.4 °C)  Pulse:  [] 90  Resp:  [17-32] 17  SpO2:  [97 %-100 %] 97 %  BP: (100-123)/(46-80) 112/53   Weight: 81.6 kg (180 lb)  Body mass index is 25.1 kg/m².      Intake/Output Summary (Last 24 hours) at 4/12/2022 0526  Last data filed at 4/12/2022 0400  Gross per 24 hour   Intake 2039.15 ml   Output 0 ml   Net 2039.15 ml       Physical Exam  Vitals and nursing note reviewed.   Constitutional:       General: He is not in acute distress.     Appearance: He is normal weight. He is ill-appearing.   HENT:      Head: Normocephalic and atraumatic.      Right Ear: External ear normal.      Left Ear: External ear normal.      Nose: Nose normal.      Mouth/Throat:      Mouth: Mucous membranes are dry.   Eyes:      General: No scleral icterus.     Extraocular Movements: Extraocular movements intact.      Pupils: Pupils are equal, round, and reactive to light.   Cardiovascular:      Rate and Rhythm: Normal rate and regular rhythm.      Pulses: Normal pulses.      Heart sounds: Normal heart sounds. No murmur heard.  Pulmonary:      Effort: Pulmonary effort is normal. No respiratory distress.      Breath sounds: Normal breath sounds. No wheezing.   Abdominal:      General: Abdomen is flat. Bowel sounds are normal. There is no distension.      Palpations: Abdomen is soft.      Tenderness: There is no abdominal tenderness.   Genitourinary:     Comments: Condom cath  Musculoskeletal:      Cervical back: Normal range of motion and  neck supple.      Right lower leg: No edema.      Left lower leg: No edema.   Skin:     General: Skin is warm and dry.      Capillary Refill: Capillary refill takes less than 2 seconds.      Coloration: Skin is pale.   Neurological:      Mental Status: He is alert and oriented to person, place, and time. He is confused.      GCS: GCS eye subscore is 4. GCS verbal subscore is 4. GCS motor subscore is 6.      Motor: Weakness present.      Comments: Drowsy, but now able to answer questions and able to follow commands.         Vents:     Lines/Drains/Airways       Drain  Duration             Male External Urinary Catheter 04/11/22 2223 Medium <1 day              Peripheral Intravenous Line  Duration                  Peripheral IV - Single Lumen 04/11/22 2111 18 G Left Antecubital <1 day         Peripheral IV - Single Lumen 04/11/22 2337 22 G Right Hand <1 day         Peripheral IV - Single Lumen 04/11/22 2338 20 G Right Antecubital <1 day                  Significant Labs:    CBC/Anemia Profile:  Recent Labs   Lab 04/11/22 2133 04/12/22  0213 04/12/22  0407   WBC 17.05*  --  16.18*   HGB 12.1*  --  10.2*   HCT 42.0 36 32.6*     --  227   MCV 99*  --  92   RDW 15.7*  --  16.2*        Chemistries:  Recent Labs   Lab 04/11/22 2133 04/11/22 2337 04/12/22  0207 04/12/22  0407   * 132* 132*  132*  132*  --    K 7.0* 6.9* 5.2*  5.2*  5.2*  --    CL 91* 93* 96  96  96  --    CO2 6* <5* 6*  6*  6*  --    BUN 42* 40* 43*  43*  43*  --    CREATININE 2.3* 2.7* 2.3*  2.3*  2.3*  --    CALCIUM 9.3 9.2 8.9  8.9  8.9  --    ALBUMIN 3.0*  --   --   --    PROT 7.4  --   --   --    BILITOT 0.4  --   --   --    ALKPHOS 151*  --   --   --    ALT 25  --   --   --    AST 20  --   --   --    MG  --   --   --  1.7   PHOS  --   --   --  2.8       All pertinent labs within the past 24 hours have been reviewed.    Significant Imaging:  I have reviewed all pertinent imaging results/findings within the past 24 hours.

## 2022-04-12 NOTE — ASSESSMENT & PLAN NOTE
Likely secondary to dehydration in the setting of DKA.  Baseline Cr per chart review ~1.0.  Cr on admission 2.3.     -- Continue IVF  -- Renal dose all medications  -- Avoid nephrotoxins

## 2022-04-12 NOTE — PROGRESS NOTES
[facility] - Cardiac Medical ICU  Critical Care Medicine  Progress Note    Patient Name: Kamar Muñoz  MRN: [id number]  Admission Date: 4/11/2022  Hospital Length of Stay: 1 days  Code Status: DNR  Attending Provider: Madelin Ocsaio MD  Primary Care Provider: Basim Guerrero MD   Principal Problem: Diabetic ketoacidosis without coma associated with type 2 diabetes mellitus    Subjective:     HPI:  Mr. Kamar Muñoz is a 76 y.o. male with a past medical history of HTN, Type 2 DM, CAD, STEMI, HLD, paroxysmal Afib, CVA, seizures and recurrent DKA.  He presented to [facility] ED on 4/11 with elevated blood glucose.  He was discharged from [facility] on 4/10 after being admitted for DKA on 4/5.  At time of exam patient is alert but altered and unable to provide any history.  Spoke with patient's daughter who states she is a primary caregiver at home and obtained history as well as review of chart.  Per family he was not feeling well after discharge to home and vomited several times on 4/11. Unknown characteristics of emesis. Finger stick at home read High with unreadable blood glucose.  At this time daughter gave him 14 units of insulin and sublingual zofran. Other than malaise and nausea patient was not exhibiting any other signs/symptoms at home.  In ER BG found to be 1015 with pCO2 6 and anion gap 35.  Hyperkalemic with K 7.0 and some EKG changes when compared to previous EKG.  Given calcium gluconate, and started on insulin gtt as well as subQ long acting insulin.     Critical Care Medicine consulted for DKA and admitted to MICU.        Hospital/ICU Course:  Admitted to MICU on 4/11 for DKA with BG >1000, pCO2 6, AG 35, and hyperkalemia.  Given Calcium gluconate and started on insulin gtt in ED.  Hyperkalemia not improved on repeat labs and bolused with IV insulin.  Infectious workup sent and broad spectrum abx started.  4/12 potassium improving with insulin.  Trending BMPs Q2H.        Interval History/Significant  Events: Admitted to MICU overnight for DKA.  Hyperkalemia resolving, BG improving on insulin gtt with appropriate correction.      Review of Systems   Unable to perform ROS: Mental status change   Objective:     Vital Signs (Most Recent):  Temp: 97.5 °F (36.4 °C) (04/12/22 0115)  Pulse: 90 (04/12/22 0400)  Resp: 17 (04/12/22 0400)  BP: (!) 112/53 (04/12/22 0400)  SpO2: 97 % (04/12/22 0400)   Vital Signs (24h Range):  Temp:  [97.5 °F (36.4 °C)-98.2 °F (36.8 °C)] 97.5 °F (36.4 °C)  Pulse:  [] 90  Resp:  [17-32] 17  SpO2:  [97 %-100 %] 97 %  BP: (100-123)/(46-80) 112/53   Weight: 81.6 kg (180 lb)  Body mass index is 25.1 kg/m².      Intake/Output Summary (Last 24 hours) at 4/12/2022 0526  Last data filed at 4/12/2022 0400  Gross per 24 hour   Intake 2039.15 ml   Output 0 ml   Net 2039.15 ml       Physical Exam  Vitals and nursing note reviewed.   Constitutional:       General: He is not in acute distress.     Appearance: He is normal weight. He is ill-appearing.   HENT:      Head: Normocephalic and atraumatic.      Right Ear: External ear normal.      Left Ear: External ear normal.      Nose: Nose normal.      Mouth/Throat:      Mouth: Mucous membranes are dry.   Eyes:      General: No scleral icterus.     Extraocular Movements: Extraocular movements intact.      Pupils: Pupils are equal, round, and reactive to light.   Cardiovascular:      Rate and Rhythm: Normal rate and regular rhythm.      Pulses: Normal pulses.      Heart sounds: Normal heart sounds. No murmur heard.  Pulmonary:      Effort: Pulmonary effort is normal. No respiratory distress.      Breath sounds: Normal breath sounds. No wheezing.   Abdominal:      General: Abdomen is flat. Bowel sounds are normal. There is no distension.      Palpations: Abdomen is soft.      Tenderness: There is no abdominal tenderness.   Genitourinary:     Comments: Condom cath  Musculoskeletal:      Cervical back: Normal range of motion and neck supple.      Right lower  leg: No edema.      Left lower leg: No edema.   Skin:     General: Skin is warm and dry.      Capillary Refill: Capillary refill takes less than 2 seconds.      Coloration: Skin is pale.   Neurological:      Mental Status: He is alert and oriented to person, place, and time. He is confused.      GCS: GCS eye subscore is 4. GCS verbal subscore is 4. GCS motor subscore is 6.      Motor: Weakness present.      Comments: Drowsy, but now able to answer questions and able to follow commands.         Vents:     Lines/Drains/Airways       Drain  Duration             Male External Urinary Catheter 04/11/22 2223 Medium <1 day              Peripheral Intravenous Line  Duration                  Peripheral IV - Single Lumen 04/11/22 2111 18 G Left Antecubital <1 day         Peripheral IV - Single Lumen 04/11/22 2337 22 G Right Hand <1 day         Peripheral IV - Single Lumen 04/11/22 2338 20 G Right Antecubital <1 day                  Significant Labs:    CBC/Anemia Profile:  Recent Labs   Lab 04/11/22 2133 04/12/22  0213 04/12/22  0407   WBC 17.05*  --  16.18*   HGB 12.1*  --  10.2*   HCT 42.0 36 32.6*     --  227   MCV 99*  --  92   RDW 15.7*  --  16.2*        Chemistries:  Recent Labs   Lab 04/11/22 2133 04/11/22 2337 04/12/22  0207 04/12/22  0407   * 132* 132*  132*  132*  --    K 7.0* 6.9* 5.2*  5.2*  5.2*  --    CL 91* 93* 96  96  96  --    CO2 6* <5* 6*  6*  6*  --    BUN 42* 40* 43*  43*  43*  --    CREATININE 2.3* 2.7* 2.3*  2.3*  2.3*  --    CALCIUM 9.3 9.2 8.9  8.9  8.9  --    ALBUMIN 3.0*  --   --   --    PROT 7.4  --   --   --    BILITOT 0.4  --   --   --    ALKPHOS 151*  --   --   --    ALT 25  --   --   --    AST 20  --   --   --    MG  --   --   --  1.7   PHOS  --   --   --  2.8       All pertinent labs within the past 24 hours have been reviewed.    Significant Imaging:  I have reviewed all pertinent imaging results/findings within the past 24 hours.      ABG  Recent Labs   Lab  04/12/22  0213   PH 7.222*   PO2 59   PCO2 17.4*   HCO3 7.1*   BE -21     Assessment/Plan:     Neuro  Encephalopathy  Alert/confused on admission. Likely secondary to DKA.   Mental status improving, drowsy but oriented x3 and able to follow commands.      -- CT head completed in ED with no acute changes/process    Focal seizures  History of seizures on lacosamide.      -- Continue home lacosamide    Cardiac/Vascular  Coronary artery disease  Continue home asa/plavix/statin.      Paroxysmal atrial fibrillation  History of afib.      -- Continue home amio  -- Holding BB given hypotension on admission  -- Continue home eliquis    Essential hypertension  Borderline hypotensive on admission.  Also with BRENDAN.  Holding home metoprolol and losartan and will resume when appropriate.      Renal/  Lactic acidosis  LA 10.3 on hospital admission.  Concern for infectious process given WBC on admission 17.05, on 4/10 prior to discharge WBC 6.82. CXR with bilateral interstitial pattern, no focal areas of consolidation.      -- Procalcitonin 1.18  -- LA ordered Q4H, downtrending  -- Blood cultures, UA with reflex ordered f/u results  -- Vanc, flagyl, cefepime, deescalate when culture data available      Hyperkalemia  Potassium in ED 7.0 with QRS widening and T wave changes noted on EKG when compared with prior.  Started on insulin gtt and given subQ long acting insulin and 2g calcium gluconate.  Repeat K 6.9.      -- Insulin bolus given on 4/12 per HyperK protocol  -- Continue insulin gtt and titrate per nomogram  -- Continuous cardiac monitoring  -- BMP ordered Q2H  -- Sodium zirc initially ordered, unable to tolerate safe PO repeat K now 5.2    BRENDAN (acute kidney injury)  Likely secondary to dehydration in the setting of DKA.  Baseline Cr per chart review ~1.0.  Cr on admission 2.3.     -- Continue IVF  -- Renal dose all medications  -- Avoid nephrotoxins    Endocrine  * Diabetic ketoacidosis without coma associated with type 2  diabetes mellitus  Unclear inciting etiology missed insulin doses vs sepsis.    Recent admission 4/5 for DKA and discharged from [facility] 4/10.  Per family malaise since discharge with vomiting starting 4/10.  BG at home greater unreadable.  Given 14 units subQ insulin at that time.  In ER BG found to be 1015 with pCO2 6 and anion gap 35 and hyperkalemia.  Started on insulin gtt in ED and given 20 units subQ long acting insulin.      -- Continue insulin gtt and titrate per nomogram  -- Insulin bolus given 4/12  -- BMP Q2 hours until BG readable by accuchecks  --  Hourly accuchecks  -- Continue IVF LR until BG <200 will change IVF at this time  -- Last Hbg A1C done 4/5 was 9.7  -- Once AG closed and acidosis resolved with transition to basal/bolus insulin  -- NPO status  -- See lactic acidosis for septic workup      Other  Goals of care, counseling/discussion  Advance Care Planning     Code Status  Verified code status with daughter by telephone.  Confirms DNR status.              Critical Care Daily Checklist:    A: Awake: RASS Goal/Actual Goal:    Actual:     B: Spontaneous Breathing Trial Performed?     C: SAT & SBT Coordinated?  n/a                      D: Delirium: CAM-ICU     E: Early Mobility Performed? Yes   F: Feeding Goal:    Status:     Current Diet Order   Procedures    Diet NPO      AS: Analgesia/Sedation none   T: Thromboembolic Prophylaxis eliquis   H: HOB > 300 Yes   U: Stress Ulcer Prophylaxis (if needed) Home ppi   G: Glucose Control On insulin gtt   B: Bowel Function Stool Occurrence: 0   I: Indwelling Catheter (Lines & Mccord) Necessity PIV   D: De-escalation of Antimicrobials/Pharmacotherapies F/u cultures, on vanc/flagyl/cefepime    Plan for the day/ETD Continue insulin gtt    Code Status:  Family/Goals of Care: DNR       Critical Care Time: 35 minutes  Critical secondary to Patient has a condition that poses threat to life and bodily function: Diabetic Ketoacidosis      Critical care was time spent  personally by me on the following activities: development of treatment plan with patient or surrogate and bedside caregivers, discussions with consultants, evaluation of patient's response to treatment, examination of patient, ordering and performing treatments and interventions, ordering and review of laboratory studies, ordering and review of radiographic studies, pulse oximetry, re-evaluation of patient's condition. This critical care time did not overlap with that of any other provider or involve time for any procedures.    Plan discussed with PCCM fellow Dr. Gaming.  Pt to be evaluated by and further recommendations by Dr. Ocasio.       Yara Rodarte NP  Critical Care Medicine  [facility] - Cardiac Medical ICU

## 2022-04-12 NOTE — ED NOTES
Pt BIBA. Discharged from hospital yesterday after admission for DKA. Family member called [phone] for N/V.

## 2022-04-12 NOTE — ASSESSMENT & PLAN NOTE
Advance Care Planning     Code Status  Verified code status with daughter by telephone.  Confirms DNR status.

## 2022-04-12 NOTE — PROGRESS NOTES
Pharmacokinetic Initial Assessment: IV Vancomycin    Assessment/Plan:    Initiate intravenous vancomycin with loading dose of 1750 mg once.  In setting of increased SCr, check random vancomycin level 12 hours after initial dose to determine if scheduling subsequent doses appropriate or if intermittent dosing indicated.  Desired empiric serum trough concentration is 15 to 20 mcg/mL.  Draw vancomycin random level on 04/12/2022 at 1430.  Pharmacy will continue to follow and monitor vancomycin.      Please contact pharmacy at extension [phone] with any questions regarding this assessment.     Thank you for the consult,   Mary Sahni       Patient brief summary:  Kamar Muñoz is a 76 y.o. male initiated on antimicrobial therapy with IV Vancomycin for treatment of suspected sepsis.    Drug Allergies:   Review of patient's allergies indicates:   Allergen Reactions    Iodine      Other reaction(s): swelling  Other reaction(s): Itching  Other reaction(s): Rash       Actual Body Weight:   81.6 kg    Renal Function:   Estimated Creatinine Clearance: 28.2 mL/min (A) (based on SCr of 2.3 mg/dL (H)).    CBC (last 72 hours):  Recent Labs   Lab Result Units 04/09/22  0334 04/10/22  0333 04/11/22  2133   WBC K/uL 7.11 6.82 17.05*   Hemoglobin g/dL 12.4* 12.3* 12.1*   Hematocrit % 39.5* 38.1* 42.0   Platelets K/uL 225 220 306   Gran % % 51.4 52.0 77.2*   Lymph % % 30.8 28.0 13.3*   Mono % % 8.7 9.1 7.1   Eosinophil % % 8.4* 9.7* 0.2   Basophil % % 0.4 0.6 0.6   Differential Method  Automated Automated Automated       Metabolic Panel (last 72 hours):  Recent Labs   Lab Result Units 04/09/22  0333 04/09/22  1638 04/10/22  0333 04/11/22  2133   Sodium mmol/L 138  --  136 132*   Potassium mmol/L 3.9  --  4.5 7.0*   Chloride mmol/L 104  --  102 91*   CO2 mmol/L 24  --  24 6*   Glucose mg/dL 61* 476* 320* 1,015*   BUN mg/dL 19  --  24* 42*   Creatinine mg/dL 0.8  --  1.0 2.3*   Albumin g/dL 2.4*  --  2.5* 3.0*   Total Bilirubin  mg/dL 0.3  --  0.4 0.4   Alkaline Phosphatase U/L 113  --  133 151*   AST U/L 28  --  63* 20   ALT U/L 19  --  39 25   Magnesium mg/dL 1.6  --  1.6  --    Phosphorus mg/dL 3.7  --  3.0  --        Drug levels (last 3 results):  No results for input(s): VANCOMYCINRA, VANCOMYCINPE, VANCOMYCINTR in the last 72 hours.    Microbiologic Results:  Microbiology Results (last 7 days)     Procedure Component Value Units Date/Time    Blood culture [[id number]]     Order Status: No result Specimen: Blood     Blood culture [[id number]]     Order Status: No result Specimen: Blood

## 2022-04-12 NOTE — CONSULTS
Nutrition consult received regarding Diabetic diet education.  Pt educated on diabetic diet 4/6; please see education tab for details.    Thanks!  Baylee MS, RD, LDN

## 2022-04-12 NOTE — ASSESSMENT & PLAN NOTE
Unclear inciting etiology missed insulin doses vs sepsis.    Recent admission 4/5 for DKA and discharged from [facility] [place] 4/10.  Per family malaise since discharge with vomiting starting 4/10.  BG at home greater unreadable.  Given 14 units subQ insulin at that time.  In ER BG found to be 1015 with pCO2 6 and anion gap 35 and hyperkalemia.  Started on insulin gtt in ED and given 20 units subQ long acting insulin.      -- Continue insulin gtt and titrate per nomogram  -- Insulin bolus ordered, not given in ED  -- BMP Q2 hours until BG readable by accuchecks  --  Hourly accuchecks  -- Continue IVF LR until BG <200 will change IVF at this time  -- Last Hbg A1C done 4/5 was 9.7  -- Once AG closed and acidosis resolved with transition to basal/bolus insulin  -- NPO status  -- See lactic acidosis for septic workup

## 2022-04-12 NOTE — ASSESSMENT & PLAN NOTE
LA 10.3 on hospital admission.  Concern for infectious process given WBC on admission 17.05, on 4/10 prior to discharge WBC 6.82. CXR with bilateral interstitial pattern, no focal areas of consolidation.      -- Check procalcitonin  -- Follow up repeat lactic  -- Blood cultures, UA with reflex ordered f/u results  -- Vanc, flagyl, cefepime, deescalate when culture data available

## 2022-04-12 NOTE — ASSESSMENT & PLAN NOTE
Potassium in ED 7.0 with QRS widening and T wave changes noted on EKG when compared with prior.  Started on insulin gtt and given subQ long acting insulin and 2g calcium gluconate.  Repeat K 6.9.      -- Insulin bolus ordered per HyperK protocol, no bolus given in ED  -- Continue insulin gtt and titrate per nomogram  -- Continuous cardiac monitoring  -- BMP ordered Q2H  -- Sodium zirc initially ordered, unable to tolerate safe PO will f/u next K and evaluate need for NG tube placement

## 2022-04-12 NOTE — NURSING
Patient potassium resulted as 5.  A third bag of potassium chloride 10mEqs was due but MD ordered to hold the medication d/t resulting lab. No new orders at this time.

## 2022-04-12 NOTE — ASSESSMENT & PLAN NOTE
Alert/confused on admission. Likely secondary to DKA.       -- CT head completed in ED with no acute changes/process

## 2022-04-12 NOTE — ASSESSMENT & PLAN NOTE
Borderline hypotensive on admission.  Also with BRENDAN.  Holding home metoprolol and losartan and will resume when appropriate.     Pt developed abd pain and dysuria on Tuesday. Went to the clinic yesterday--had positive ua andshe was sent to the urgency room as  The drr thought she was dehydrated.  They tried to start an iv to give fluids and start her antibiotics but they could never start the iv after 8 iv attempts.  They gave her one pill for the nausea and sent a script for an antibiotic to be started but pt felt too bad to go to the pharmacy. She is here as her abd pain and low back pain  Continues, she has had chills over the night, pt has a ha.

## 2022-04-12 NOTE — ASSESSMENT & PLAN NOTE
Borderline hypotensive on admission.  Also with BRENDAN.  Holding home metoprolol and losartan and will resume when appropriate.

## 2022-04-12 NOTE — ASSESSMENT & PLAN NOTE
Potassium in ED 7.0 with QRS widening and T wave changes noted on EKG when compared with prior.  Started on insulin gtt and given subQ long acting insulin and 2g calcium gluconate.  Repeat K 6.9.      -- Insulin bolus given on 4/12 per HyperK protocol  -- Continue insulin gtt and titrate per nomogram  -- Continuous cardiac monitoring  -- BMP ordered Q2H  -- Sodium zirc initially ordered, unable to tolerate safe PO repeat K now 5.2

## 2022-04-12 NOTE — PROGRESS NOTES
Therapy with Vancomycin complete and/or consult discontinued by provider.  Pharmacy will sign off, please re-consult as needed.     Bertram Casillas Pharm.D  Ext: [phone]

## 2022-04-12 NOTE — H&P
[facility] Dept  Critical Care Medicine  History & Physical    Patient Name: Kamar Muñoz  MRN: [id number]  Admission Date: 4/11/2022  Hospital Length of Stay: 1 days  Code Status: DNR  Attending Physician: Madelin Ocasio MD   Primary Care Provider: Basim Guerrero MD   Principal Problem: Diabetic ketoacidosis without coma associated with type 2 diabetes mellitus    Subjective:     HPI:  Mr. Kamar Muñoz is a 76 y.o. male with a past medical history of HTN, Type 2 DM, CAD, STEMI, HLD, paroxysmal Afib, CVA, seizures and recurrent DKA.  He [place] [facility] [place] on [place]11 with elevated blood glucose.  He was discharged [place] [facility] [place]10 after being admitted for DKA on 4/5.  At time of exam patient is alert but altered and unable to provide any history.  Spoke with patient's daughter who states she is a primary caregiver at home and obtained history as well as review of chart.  Per family he was not feeling well after discharge to home and vomited several times on 4/11. Unknown characteristics of emesis. Finger stick at home read High with unreadable blood glucose.  At this time daughter gave him 14 units of insulin and sublingual zofran. Other than malaise and nausea patient was not exhibiting any other signs/symptoms at home.  In ER BG found to be 1015 with pCO2 6 and anion gap 35.  Hyperkalemic with K 7.0 and some EKG changes when compared to previous EKG.  Given calcium gluconate, and started on insulin gtt as well as subQ long acting insulin.     Critical Care Medicine consulted for DKA and admitted to MICU.        Hospital/ICU Course:  No notes on file     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    Hyperlipidemia     Hypertension     Kidney stone     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 8/2/2012    STEMI involving  right coronary artery 2022    Type II or unspecified type diabetes mellitus with neurological manifestations, uncontrolled(250.62) 3/8/2013    Urinary tract infection        Past Surgical History:   Procedure Laterality Date    BACK SURGERY      CATARACT EXTRACTION W/  INTRAOCULAR LENS IMPLANT Right     Per Dr Romero note 2018    COLONOSCOPY N/A 2019    Procedure: COLONOSCOPY Suprep;  Surgeon: Anh Johnson MD;  Location: [facility] ENDO;  Service: Endoscopy;  Laterality: N/A;    EYE SURGERY      HERNIA REPAIR      LEFT HEART CATHETERIZATION Left 2022    Procedure: CATHETERIZATION, HEART, LEFT;  Surgeon: Will Hurst III, MD;  Location: [facility] CATH LAB/EP;  Service: Cardiology;  Laterality: Left;    renal stones      SHOULDER OPEN ROTATOR CUFF REPAIR         Review of patient's allergies indicates:   Allergen Reactions    Iodine      Other reaction(s): swelling  Other reaction(s): Itching  Other reaction(s): Rash       Family History       Problem Relation (Age of Onset)    Diabetes Father          Tobacco Use    Smoking status: Former Smoker     Packs/day: 1.50     Years: 25.00     Pack years: 37.50     Quit date: 1983     Years since quittin.3    Smokeless tobacco: Never Used   Substance and Sexual Activity    Alcohol use: No    Drug use: No    Sexual activity: Yes     Partners: Female      Review of Systems   Unable to perform ROS: Mental status change   Objective:     Vital Signs (Most Recent):  Temp: 98.2 °F (36.8 °C) (22)  Pulse: (!) 120 (22)  Resp: (!) 32 (22)  BP: 94/68 (22)  SpO2: 100 % (22)   Vital Signs (24h Range):  Temp:  [98.2 °F (36.8 °C)] 98.2 °F (36.8 °C)  Pulse:  [104-120] 120  Resp:  [18-32] 32  SpO2:  [98 %-100 %] 100 %  BP: ()/(46-68) 94/68   Weight: 81.6 kg (180 lb)  Body mass index is 25.1 kg/m².      Intake/Output Summary (Last 24 hours) at 2022 0031  Last data filed at 2022  2359  Gross per 24 hour   Intake 1100 ml   Output --   Net 1100 ml       Physical Exam  Vitals and nursing note reviewed.   Constitutional:       General: He is not in acute distress.     Appearance: He is normal weight. He is ill-appearing.   HENT:      Head: Normocephalic and atraumatic.      Right Ear: External ear normal.      Left Ear: External ear normal.      Nose: Nose normal.      Mouth/Throat:      Mouth: Mucous membranes are dry.   Eyes:      General: No scleral icterus.     Extraocular Movements: Extraocular movements intact.      Pupils: Pupils are equal, round, and reactive to light.   Cardiovascular:      Rate and Rhythm: Tachycardia present.      Pulses: Normal pulses.      Heart sounds: Normal heart sounds. No murmur heard.  Pulmonary:      Effort: Pulmonary effort is normal. No respiratory distress.      Breath sounds: Normal breath sounds. No wheezing.   Abdominal:      General: Abdomen is flat. Bowel sounds are normal. There is no distension.      Palpations: Abdomen is soft.      Tenderness: There is no abdominal tenderness.   Genitourinary:     Comments: Condom cath  Musculoskeletal:      Cervical back: Normal range of motion and neck supple.      Right lower leg: No edema.      Left lower leg: No edema.   Skin:     General: Skin is warm and dry.      Capillary Refill: Capillary refill takes less than 2 seconds.      Coloration: Skin is pale.   Neurological:      Mental Status: He is alert. He is disoriented and confused.      GCS: GCS eye subscore is 4. GCS verbal subscore is 3. GCS motor subscore is 5.       Vents:     Lines/Drains/Airways       Drain  Duration             Male External Urinary Catheter 04/11/22 2223 Medium <1 day              Peripheral Intravenous Line  Duration                  Peripheral IV - Single Lumen 04/11/22 2111 18 G Left Antecubital <1 day         Peripheral IV - Single Lumen 04/11/22 2337 22 G Right Hand <1 day         Peripheral IV - Single Lumen 04/11/22 2338  20 G Right Antecubital <1 day                  Significant Labs:    CBC/Anemia Profile:  Recent Labs   Lab 04/10/22  0333 04/11/22  2133   WBC 6.82 17.05*   HGB 12.3* 12.1*   HCT 38.1* 42.0    306   MCV 91 99*   RDW 16.2* 15.7*        Chemistries:  Recent Labs   Lab 04/10/22  0333 04/11/22  2133    132*   K 4.5 7.0*    91*   CO2 24 6*   BUN 24* 42*   CREATININE 1.0 2.3*   CALCIUM 8.3* 9.3   ALBUMIN 2.5* 3.0*   PROT 5.9* 7.4   BILITOT 0.4 0.4   ALKPHOS 133 151*   ALT 39 25   AST 63* 20   MG 1.6  --    PHOS 3.0  --        All pertinent labs within the past 24 hours have been reviewed.    Significant Imaging: I have reviewed all pertinent imaging results/findings within the past 24 hours.    Assessment/Plan:     Neuro  Encephalopathy  Alert/confused on admission. Likely secondary to DKA.       -- CT head completed in ED with no acute changes/process    Focal seizures  History of seizures on lacosamide.      -- Continue home lacosamide    Cardiac/Vascular  Coronary artery disease  Continue home asa/plavix/statin.      Paroxysmal atrial fibrillation  History of afib.      -- Restarted home amio  -- Holding BB given hypotension on admission  -- Continue home eliquis    Essential hypertension  Borderline hypotensive on admission.  Also with BRENDAN.  Holding home metoprolol and losartan and will resume when appropriate.      Renal/  Lactic acidosis  LA 10.3 on hospital admission.  Concern for infectious process given WBC on admission 17.05, on 4/10 prior to discharge WBC 6.82. CXR with bilateral interstitial pattern, no focal areas of consolidation.      -- Check procalcitonin  -- Follow up repeat lactic  -- Blood cultures, UA with reflex ordered f/u results  -- Vanc, flagyl, cefepime, deescalate when culture data available      Hyperkalemia  Potassium in ED 7.0 with QRS widening and T wave changes noted on EKG when compared with prior.  Started on insulin gtt and given subQ long acting insulin and 2g  calcium gluconate.  Repeat K 6.9.      -- Insulin bolus ordered per HyperK protocol, no bolus given in ED  -- Continue insulin gtt and titrate per nomogram  -- Continuous cardiac monitoring  -- BMP ordered Q2H  -- Sodium zirc initially ordered, unable to tolerate safe PO will f/u next K and evaluate need for NG tube placement    BRENDAN (acute kidney injury)  Likely secondary to dehydration in the setting of DKA.  Baseline Cr per chart review ~1.0.  Cr on admission 2.3.     -- Continue IVF  -- Renal dose all medications  -- Avoid nephrotoxins    Endocrine  * Diabetic ketoacidosis without coma associated with type 2 diabetes mellitus  Unclear inciting etiology missed insulin doses vs sepsis.    Recent admission 4/5 for DKA and discharged from [facility] 4[place]10.  Per family malaise since discharge with vomiting starting 4/10.  BG at home greater unreadable.  Given 14 units subQ insulin at that time.  In ER BG found to be 1015 with pCO2 6 and anion gap 35 and hyperkalemia.  Started on insulin gtt in ED and given 20 units subQ long acting insulin.      -- Continue insulin gtt and titrate per nomogram  -- Insulin bolus ordered, not given in ED  -- BMP Q2 hours until BG readable by accuchecks  --  Hourly accuchecks  -- Continue IVF LR until BG <200 will change IVF at this time  -- Last Hbg A1C done 4/5 was 9.7  -- Once AG closed and acidosis resolved with transition to basal/bolus insulin  -- NPO status  -- See lactic acidosis for septic workup      Other  Goals of care, counseling/discussion  Advance Care Planning     Code Status  Verified code status with daughter by telephone.  Confirms DNR status.             Critical Care Daily Checklist:    A: Awake: RASS Goal/Actual Goal:    Actual:     B: Spontaneous Breathing Trial Performed?     C: SAT & SBT Coordinated?  n/a                      D: Delirium: CAM-ICU     E: Early Mobility Performed? No   F: Feeding Goal:    Status:     Current Diet Order   Procedures    Diet NPO      AS:  Analgesia/Sedation none   T: Thromboembolic Prophylaxis eliquis   H: HOB > 300 Yes   U: Stress Ulcer Prophylaxis (if needed) Home protonix ordered   G: Glucose Control Insulin gtt DKA protocol   B: Bowel Function     I: Indwelling Catheter (Lines & Mccord) Necessity PIV   D: De-escalation of Antimicrobials/Pharmacotherapies Broad spectrum abx, fu culture results    Plan for the day/ETD Admit to MICU     Code Status:  Family/Goals of Care: DNR       Critical Care Time: 50 minutes  Critical secondary to Patient has a condition that poses threat to life and bodily function: Diabetic ketoacidosis     Critical care was time spent personally by me on the following activities: development of treatment plan with patient or surrogate and bedside caregivers, discussions with consultants, evaluation of patient's response to treatment, examination of patient, ordering and performing treatments and interventions, ordering and review of laboratory studies, ordering and review of radiographic studies, pulse oximetry, re-evaluation of patient's condition. This critical care time did not overlap with that of any other provider or involve time for any procedures.    Spoke with daughter Basia and updated her on admission to MICU.      Plan discussed with PCCM fellow Dr. Gaming.      Yara Rodarte NP  Critical Care Medicine  [facility] Dept

## 2022-04-12 NOTE — ED NOTES
Patient identifiers for Kamar Muñoz 76 y.o. male checked and correct.  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 Oriented to self only.   APPEARANCE: Elderly, Ill appearing.  HEENT:   SKIN: The skin is warm and dry. Patient has delayed skin turgor and dry mucus membranes.   MUSCULOSKELETAL: Patient is moving all extremities well, no obvious deformities noted. Pulses intact.   RESPIRATORY: Airway is open and patent. Respirations are spontaneous and non-labored with normal effort and rate.  CARDIAC: Tachycardic, No peripheral edema noted.   ABDOMEN: No distention noted. Soft and non-tender upon palpation.  NEUROLOGICAL: pupils 3 mm, PERRL. Facial expression is symmetrical. Hand grasps are equal bilaterally. Normal sensation in all extremities when touched with finger.

## 2022-04-12 NOTE — ASSESSMENT & PLAN NOTE
History of afib.      -- Restarted home amio  -- Holding BB given hypotension on admission  -- Continue home eliquis

## 2022-04-12 NOTE — PLAN OF CARE
CMICU DAILY GOALS       A: Awake    RASS: Goal -    Actual -     Restraint necessity:    B: Breathe   SBT: NA   C: Coordinate A & B, analgesics/sedatives   Pain: managed    SAT: Not intubated  D: Delirium   CAM-ICU:    E: Early(intubated/ Progressive (non-intubated) Mobility   MOVE Screen:  N/A   Activity: Activity Management: Rolling - L1  FAS: Feeding/Nutrition   Diet order: Diet/Nutrition Received: NPO,    T: Thrombus   DVT prophylaxis: VTE Required Core Measure: Pharmacological prophylaxis initiated/maintained  H: HOB Elevation   Head of Bed (HOB) Positioning: HOB at 20-30 degrees  U: Ulcer Prophylaxis   GI: no  G: Glucose control   uncontrolled Glycemic Management: blood glucose monitored  S: Skin   Bathing/Skin Care: bath, complete, dressed/undressed, linen changed, electrode patches/site rotation     Pressure Reduction Devices: pressure-redistributing mattress utilized  Pressure Reduction Techniques: frequent weight shift encouraged, weight shift assistance provided  Skin Protection: adhesive use limited, skin-to-device areas padded, skin-to-skin areas padded, transparent dressing maintained, tubing/devices free from skin contact  B: Bowel Function   no issues   I: Indwelling Catheters   Mccord necessity:     CVC necessity: No  D: De-escalation Antibiotics   Yes    Family/Goals of care/Code Status   Code Status: DNR    24H Vital Sign Range  Temp:  [97.5 °F (36.4 °C)-98.2 °F (36.8 °C)]   Pulse:  []   Resp:  [16-32]   BP: (100-123)/(46-80)   SpO2:  [97 %-100 %]      Shift Events   No acute events throughout shift    VS and assessment per flow sheet, patient progressing towards goals as tolerated, plan of care reviewed with Mr. Muñoz, all concerns addressed, will continue to monitor.    Nena Powers

## 2022-04-12 NOTE — ASSESSMENT & PLAN NOTE
LA 10.3 on hospital admission.  Concern for infectious process given WBC on admission 17.05, on 4/10 prior to discharge WBC 6.82. CXR with bilateral interstitial pattern, no focal areas of consolidation.      -- Procalcitonin 1.18  -- LA ordered Q4H, downtrending  -- Blood cultures, UA with reflex ordered f/u results  -- Vanc, flagyl, cefepime, deescalate when culture data available

## 2022-04-12 NOTE — ASSESSMENT & PLAN NOTE
Unclear inciting etiology missed insulin doses vs sepsis.    Recent admission 4/5 for DKA and [place] [facility] [place]  Per family malaise since discharge with vomiting starting 4/10.  BG at home greater unreadable.  Given 14 units subQ insulin at that time.  In ER BG found to be 1015 with pCO2 6 and anion gap 35 and hyperkalemia.  Started on insulin gtt in ED and given 20 units subQ long acting insulin.      -- Continue insulin gtt and titrate per nomogram  -- Insulin bolus given 4/12  -- BMP Q2 hours until BG readable by accuchecks  --  Hourly accuchecks  -- Continue IVF LR until BG <200 will change IVF at this time  -- Last Hbg A1C done 4/5 was 9.7  -- Once AG closed and acidosis resolved with transition to basal/bolus insulin  -- NPO status  -- See lactic acidosis for septic workup

## 2022-04-12 NOTE — ASSESSMENT & PLAN NOTE
History of afib.      -- Continue home amio  -- Holding BB given hypotension on admission  -- Continue home eliquis

## 2022-04-13 PROBLEM — E87.5 HYPERKALEMIA: Status: RESOLVED | Noted: 2022-01-25 | Resolved: 2022-04-13

## 2022-04-13 PROBLEM — G93.40 ENCEPHALOPATHY: Status: RESOLVED | Noted: 2022-04-12 | Resolved: 2022-04-13

## 2022-04-13 LAB
ANION GAP SERPL CALC-SCNC: 8 MMOL/L (ref 8–16)
ANION GAP SERPL CALC-SCNC: 9 MMOL/L (ref 8–16)
BASOPHILS # BLD AUTO: 0.05 K/UL (ref 0–0.2)
BASOPHILS NFR BLD: 0.3 % (ref 0–1.9)
BUN SERPL-MCNC: 23 MG/DL (ref 8–23)
BUN SERPL-MCNC: 26 MG/DL (ref 8–23)
CALCIUM SERPL-MCNC: 8.4 MG/DL (ref 8.7–10.5)
CALCIUM SERPL-MCNC: 8.5 MG/DL (ref 8.7–10.5)
CHLORIDE SERPL-SCNC: 104 MMOL/L (ref 95–110)
CHLORIDE SERPL-SCNC: 105 MMOL/L (ref 95–110)
CO2 SERPL-SCNC: 25 MMOL/L (ref 23–29)
CO2 SERPL-SCNC: 26 MMOL/L (ref 23–29)
CREAT SERPL-MCNC: 1.2 MG/DL (ref 0.5–1.4)
CREAT SERPL-MCNC: 1.3 MG/DL (ref 0.5–1.4)
DIFFERENTIAL METHOD: ABNORMAL
EOSINOPHIL # BLD AUTO: 0.3 K/UL (ref 0–0.5)
EOSINOPHIL NFR BLD: 2 % (ref 0–8)
ERYTHROCYTE [DISTWIDTH] IN BLOOD BY AUTOMATED COUNT: 16.7 % (ref 11.5–14.5)
EST. GFR  (AFRICAN AMERICAN): >60 ML/MIN/1.73 M^2
EST. GFR  (AFRICAN AMERICAN): >60 ML/MIN/1.73 M^2
EST. GFR  (NON AFRICAN AMERICAN): 52.3 ML/MIN/1.73 M^2
EST. GFR  (NON AFRICAN AMERICAN): 57.6 ML/MIN/1.73 M^2
GLUCOSE SERPL-MCNC: 148 MG/DL (ref 70–110)
GLUCOSE SERPL-MCNC: 86 MG/DL (ref 70–110)
HCT VFR BLD AUTO: 32.2 % (ref 40–54)
HGB BLD-MCNC: 10.4 G/DL (ref 14–18)
IMM GRANULOCYTES # BLD AUTO: 0.07 K/UL (ref 0–0.04)
IMM GRANULOCYTES NFR BLD AUTO: 0.5 % (ref 0–0.5)
LYMPHOCYTES # BLD AUTO: 2.1 K/UL (ref 1–4.8)
LYMPHOCYTES NFR BLD: 13.7 % (ref 18–48)
MAGNESIUM SERPL-MCNC: 1.5 MG/DL (ref 1.6–2.6)
MCH RBC QN AUTO: 29.5 PG (ref 27–31)
MCHC RBC AUTO-ENTMCNC: 32.3 G/DL (ref 32–36)
MCV RBC AUTO: 91 FL (ref 82–98)
MONOCYTES # BLD AUTO: 0.9 K/UL (ref 0.3–1)
MONOCYTES NFR BLD: 6.1 % (ref 4–15)
NEUTROPHILS # BLD AUTO: 11.8 K/UL (ref 1.8–7.7)
NEUTROPHILS NFR BLD: 77.4 % (ref 38–73)
NRBC BLD-RTO: 0 /100 WBC
PHOSPHATE SERPL-MCNC: 3.2 MG/DL (ref 2.7–4.5)
PLATELET # BLD AUTO: 215 K/UL (ref 150–450)
PMV BLD AUTO: 9.9 FL (ref 9.2–12.9)
POCT GLUCOSE: 167 MG/DL (ref 70–110)
POCT GLUCOSE: 217 MG/DL (ref 70–110)
POCT GLUCOSE: 280 MG/DL (ref 70–110)
POCT GLUCOSE: 310 MG/DL (ref 70–110)
POCT GLUCOSE: 322 MG/DL (ref 70–110)
POCT GLUCOSE: 442 MG/DL (ref 70–110)
POCT GLUCOSE: 97 MG/DL (ref 70–110)
POTASSIUM SERPL-SCNC: 3.6 MMOL/L (ref 3.5–5.1)
POTASSIUM SERPL-SCNC: 3.8 MMOL/L (ref 3.5–5.1)
RBC # BLD AUTO: 3.53 M/UL (ref 4.6–6.2)
SODIUM SERPL-SCNC: 138 MMOL/L (ref 136–145)
SODIUM SERPL-SCNC: 139 MMOL/L (ref 136–145)
TROPONIN I SERPL DL<=0.01 NG/ML-MCNC: 0.07 NG/ML (ref 0–0.03)
WBC # BLD AUTO: 15.27 K/UL (ref 3.9–12.7)

## 2022-04-13 PROCEDURE — 25000003 PHARM REV CODE 250: Performed by: NURSE PRACTITIONER

## 2022-04-13 PROCEDURE — 99291 CRITICAL CARE FIRST HOUR: CPT | Mod: ,,, | Performed by: NURSE PRACTITIONER

## 2022-04-13 PROCEDURE — 25000003 PHARM REV CODE 250

## 2022-04-13 PROCEDURE — 84100 ASSAY OF PHOSPHORUS: CPT

## 2022-04-13 PROCEDURE — 80048 BASIC METABOLIC PNL TOTAL CA: CPT | Mod: 91 | Performed by: NURSE PRACTITIONER

## 2022-04-13 PROCEDURE — 84484 ASSAY OF TROPONIN QUANT: CPT | Performed by: STUDENT IN AN ORGANIZED HEALTH CARE EDUCATION/TRAINING PROGRAM

## 2022-04-13 PROCEDURE — 63600175 PHARM REV CODE 636 W HCPCS: Performed by: NURSE PRACTITIONER

## 2022-04-13 PROCEDURE — 94761 N-INVAS EAR/PLS OXIMETRY MLT: CPT

## 2022-04-13 PROCEDURE — 83735 ASSAY OF MAGNESIUM: CPT

## 2022-04-13 PROCEDURE — 20000000 HC ICU ROOM

## 2022-04-13 PROCEDURE — 25500020 PHARM REV CODE 255: Performed by: INTERNAL MEDICINE

## 2022-04-13 PROCEDURE — 85025 COMPLETE CBC W/AUTO DIFF WBC: CPT

## 2022-04-13 PROCEDURE — 80048 BASIC METABOLIC PNL TOTAL CA: CPT | Performed by: NURSE PRACTITIONER

## 2022-04-13 PROCEDURE — 63600175 PHARM REV CODE 636 W HCPCS

## 2022-04-13 PROCEDURE — 99291 PR CRITICAL CARE, E/M 30-74 MINUTES: ICD-10-PCS | Mod: ,,, | Performed by: NURSE PRACTITIONER

## 2022-04-13 RX ORDER — AMOXICILLIN 250 MG
1 CAPSULE ORAL 2 TIMES DAILY
Status: DISCONTINUED | OUTPATIENT
Start: 2022-04-13 | End: 2022-05-03 | Stop reason: HOSPADM

## 2022-04-13 RX ORDER — INSULIN ASPART 100 [IU]/ML
1-10 INJECTION, SOLUTION INTRAVENOUS; SUBCUTANEOUS
Status: DISCONTINUED | OUTPATIENT
Start: 2022-04-13 | End: 2022-04-13

## 2022-04-13 RX ORDER — TRAMADOL HYDROCHLORIDE 50 MG/1
50 TABLET ORAL ONCE
Status: COMPLETED | OUTPATIENT
Start: 2022-04-13 | End: 2022-04-13

## 2022-04-13 RX ORDER — DIPHENHYDRAMINE HYDROCHLORIDE 50 MG/ML
50 INJECTION INTRAMUSCULAR; INTRAVENOUS ONCE
Status: DISCONTINUED | OUTPATIENT
Start: 2022-04-13 | End: 2022-04-13

## 2022-04-13 RX ORDER — MAGNESIUM SULFATE HEPTAHYDRATE 40 MG/ML
2 INJECTION, SOLUTION INTRAVENOUS ONCE
Status: COMPLETED | OUTPATIENT
Start: 2022-04-13 | End: 2022-04-13

## 2022-04-13 RX ORDER — GLUCAGON 1 MG
1 KIT INJECTION
Status: DISCONTINUED | OUTPATIENT
Start: 2022-04-13 | End: 2022-04-28

## 2022-04-13 RX ORDER — DIPHENHYDRAMINE HYDROCHLORIDE 50 MG/ML
50 INJECTION INTRAMUSCULAR; INTRAVENOUS ONCE
Status: COMPLETED | OUTPATIENT
Start: 2022-04-13 | End: 2022-04-13

## 2022-04-13 RX ORDER — IBUPROFEN 200 MG
24 TABLET ORAL
Status: DISCONTINUED | OUTPATIENT
Start: 2022-04-13 | End: 2022-04-13

## 2022-04-13 RX ORDER — METOPROLOL SUCCINATE 50 MG/1
50 TABLET, EXTENDED RELEASE ORAL DAILY
Status: DISCONTINUED | OUTPATIENT
Start: 2022-04-13 | End: 2022-05-03 | Stop reason: HOSPADM

## 2022-04-13 RX ORDER — INSULIN ASPART 100 [IU]/ML
1-10 INJECTION, SOLUTION INTRAVENOUS; SUBCUTANEOUS EVERY 6 HOURS PRN
Status: DISCONTINUED | OUTPATIENT
Start: 2022-04-13 | End: 2022-04-25

## 2022-04-13 RX ORDER — GLUCAGON 1 MG
1 KIT INJECTION
Status: DISCONTINUED | OUTPATIENT
Start: 2022-04-13 | End: 2022-04-13

## 2022-04-13 RX ORDER — POLYETHYLENE GLYCOL 3350 17 G/17G
17 POWDER, FOR SOLUTION ORAL DAILY
Status: DISCONTINUED | OUTPATIENT
Start: 2022-04-13 | End: 2022-05-03 | Stop reason: HOSPADM

## 2022-04-13 RX ORDER — IBUPROFEN 200 MG
16 TABLET ORAL
Status: DISCONTINUED | OUTPATIENT
Start: 2022-04-13 | End: 2022-04-13

## 2022-04-13 RX ADMIN — APIXABAN 5 MG: 5 TABLET, FILM COATED ORAL at 10:04

## 2022-04-13 RX ADMIN — ONDANSETRON 4 MG: 2 INJECTION INTRAMUSCULAR; INTRAVENOUS at 08:04

## 2022-04-13 RX ADMIN — LACOSAMIDE 100 MG: 100 TABLET, FILM COATED ORAL at 10:04

## 2022-04-13 RX ADMIN — INSULIN ASPART 6 UNITS: 100 INJECTION, SOLUTION INTRAVENOUS; SUBCUTANEOUS at 08:04

## 2022-04-13 RX ADMIN — INSULIN ASPART 10 UNITS: 100 INJECTION, SOLUTION INTRAVENOUS; SUBCUTANEOUS at 12:04

## 2022-04-13 RX ADMIN — PANTOPRAZOLE SODIUM 40 MG: 40 TABLET, DELAYED RELEASE ORAL at 08:04

## 2022-04-13 RX ADMIN — INSULIN ASPART 8 UNITS: 100 INJECTION, SOLUTION INTRAVENOUS; SUBCUTANEOUS at 06:04

## 2022-04-13 RX ADMIN — METOPROLOL SUCCINATE 50 MG: 50 TABLET, EXTENDED RELEASE ORAL at 10:04

## 2022-04-13 RX ADMIN — SENNOSIDES AND DOCUSATE SODIUM 1 TABLET: 50; 8.6 TABLET ORAL at 08:04

## 2022-04-13 RX ADMIN — DIPHENHYDRAMINE HYDROCHLORIDE 50 MG: 50 INJECTION INTRAMUSCULAR; INTRAVENOUS at 04:04

## 2022-04-13 RX ADMIN — APIXABAN 5 MG: 5 TABLET, FILM COATED ORAL at 08:04

## 2022-04-13 RX ADMIN — POLYETHYLENE GLYCOL 3350 17 G: 17 POWDER, FOR SOLUTION ORAL at 08:04

## 2022-04-13 RX ADMIN — AMIODARONE HYDROCHLORIDE 200 MG: 200 TABLET ORAL at 08:04

## 2022-04-13 RX ADMIN — ATORVASTATIN CALCIUM 40 MG: 20 TABLET, FILM COATED ORAL at 08:04

## 2022-04-13 RX ADMIN — IOHEXOL 75 ML: 350 INJECTION, SOLUTION INTRAVENOUS at 06:04

## 2022-04-13 RX ADMIN — TRAMADOL HYDROCHLORIDE 50 MG: 50 TABLET, COATED ORAL at 02:04

## 2022-04-13 RX ADMIN — ASPIRIN 81 MG: 81 TABLET, COATED ORAL at 08:04

## 2022-04-13 RX ADMIN — MAGNESIUM SULFATE 2 G: 2 INJECTION INTRAVENOUS at 08:04

## 2022-04-13 RX ADMIN — CLOPIDOGREL 75 MG: 75 TABLET, FILM COATED ORAL at 08:04

## 2022-04-13 RX ADMIN — LACOSAMIDE 100 MG: 100 TABLET, FILM COATED ORAL at 08:04

## 2022-04-13 RX ADMIN — SENNOSIDES AND DOCUSATE SODIUM 1 TABLET: 50; 8.6 TABLET ORAL at 10:04

## 2022-04-13 NOTE — ASSESSMENT & PLAN NOTE
LA 10.3 on hospital admission.  Concern for infectious process given WBC on admission 17.05, on 4/10 prior to discharge WBC 6.82. CXR with bilateral interstitial pattern, no focal areas of consolidation.      -- Procalcitonin 1.18  -- Blood cultures NGTD  -- Antibiotics discontinued

## 2022-04-13 NOTE — PLAN OF CARE
[facility] Card[facility] Medical [facility]  Initial Discharge Assessment       Primary Care Provider: Basim Guerrero MD    Admission Diagnosis: Diabetic ketosis [E13.10]  Metabolic acidosis [E87.2]  Hyperglycemia [R73.9]    Admission Date: 4/11/2022  Expected Discharge Date:     Discharge Barriers Identified: None    Payor: [employer] MANAGED MEDICARE / Plan: [employer] MEDICARE HMO / Product Type: Capitation /     Extended Emergency Contact Information  Primary Emergency Contact: Basia Herrera  Mobile Phone: [phone]  Relation: Daughter  Secondary Emergency Contact: AdriánMarisa  Mobile Phone: [phone]  Relation: Daughter  Preferred language: English   needed? 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  Patient was able to answer questions.  Per patient, he lives with Aimee Muñoz (spouse) [phone] in a 1- story home with 1 step(s) to enter.   Per patient, he was independent with ADLS and used rolling walker for ambulation. Per patient, he is not on dialysis and does not take  Coumadin.  Patient will have help from Basia Sharon (daughter) [phone], Marisa Sampson (daughter) [phone] upon discharge.   Discharge Planning Booklet given to patient/family and discussed.  All questions addressed.  CM will follow for needs.      Initial Assessment (most recent)     Adult Discharge Assessment - 04/12/22 1957        Discharge Assessment    Assessment Type Discharge Planning Assessment     Confirmed/corrected address, phone number and insurance Yes     Confirmed Demographics Correct on Facesheet     Source of Information patient     When was your last doctors appointment? 03/11/22     Communicated ALLIE with patient/caregiver Date not available/Unable to determine     Reason For Admission Diabetic ketoacidosis     Lives With spouse     Facility Arrived From: Home     Do you expect to return to your current living situation? Yes     Do you have help at home or someone to help you manage your care at home? Yes     Who are your caregiver(s) and their phone number(s)? Aimee Muñoz (spouse) [phone]     Prior to hospitilization cognitive status: Alert/Oriented     Current cognitive status: Alert/Oriented     Walking or Climbing Stairs Difficulty ambulation difficulty, requires equipment     Mobility Management rolling walker     Dressing/Bathing Difficulty none     Equipment Currently Used at Home walker, rolling     Readmission within 30 days? Yes     Patient currently being followed by outpatient case management? No     Do you currently have service(s) that help you manage your care at home? Yes     Name and Contact number of agency [facility]     Is the pt/caregiver preference to resume services with current agency Yes     Do you take prescription medications? Yes     Do you have prescription coverage? Yes     Coverage [employer] - HMO     Do you have any problems affording any of your prescribed medications? No     Is the patient taking medications as prescribed?  yes     Who is going to help you get home at discharge? Basia Herrera (daughter) [phone], Marisa Sampson (daughter) [phone]     How do you get to doctors appointments? car, drives self;family or friend will provide     Are you on dialysis? No     Do you take coumadin? No     Discharge Plan A Home Health     Discharge Plan B Home with family     DME Needed Upon Discharge  other (see comments)   TBD    Discharge Plan discussed with: Patient     Discharge Barriers Identified None        Relationship/Environment    Name(s) of Who Lives With Patient Aimee Muñoz (spouse) [phone]                        PCP:  Basim Guerrero MD  [phone]        Pharmacy:    [id number] - [place] - [address] AT [facility] [address]  [address]  [place] [address]  Phone: [phone] Fax: [phone]    [facility]  [address] [facility] [address]  [place] [address]  Phone: [phone] Fax: [phone]        Emergency Contacts:  Extended Emergency Contact Information  Primary Emergency Contact: Basia Herrera  Mobile Phone: [phone]  Relation: Daughter  Secondary Emergency Contact: Marisa Sampson  Mobile Phone: [phone]  Relation: Daughter  Preferred language: English   needed? No      Insurance:    Payor: HUMANA MANAGED MEDICARE / Plan: HU[employer] MEDICARE HMO / Product Type: Capitation /     Mitzi Adams RN     [phone]      04/12/2022  8:08 PM

## 2022-04-13 NOTE — PROGRESS NOTES
[facility] - Card[facility] Medical [facility]  Critical Care Medicine  Progress Note    Patient Name: Kamar Muñoz  MRN: [id number]  Admission Date: 4/11/2022  Hospital Length of Stay: 2 days  Code Status: DNR  Attending Provider: Madelin Ocasio MD  Primary Care Provider: Basim Guerrero MD   Principal Problem: Diabetic ketoacidosis without coma associated with type 2 diabetes mellitus    Subjective:     HPI:  Mr. Kamar Muñoz is a 76 y.o. male with a past medical history of HTN, Type 2 DM, CAD, STEMI, HLD, paroxysmal Afib, CVA, seizures and recurrent DKA.  He presented to [facility] ED on 4/11 with elevated blood glucose.  He was discharged from [facility] on 4/10 after being admitted for DKA on 4/5.  At time of exam patient is alert but altered and unable to provide any history.  Spoke with patient's daughter who states she is a primary caregiver at home and obtained history as well as review of chart.  Per family he was not feeling well after discharge to home and vomited several times on 4/11. Unknown characteristics of emesis. Finger stick at home read High with unreadable blood glucose.  At this time daughter gave him 14 units of insulin and sublingual zofran. Other than malaise and nausea patient was not exhibiting any other signs/symptoms at home.  In ER BG found to be 1015 with pCO2 6 and anion gap 35.  Hyperkalemic with K 7.0 and some EKG changes when compared to previous EKG.  Given calcium gluconate, and started on insulin gtt as well as subQ long acting insulin.     Critical Care Medicine consulted for DKA and admitted to MICU.        Hospital/ICU Course:  Admitted to MICU on 4/11 for DKA with BG >1000, pCO2 6, AG 35, and hyperkalemia.  Given Calcium gluconate and started on insulin gtt in ED.  Hyperkalemia not improved on repeat labs and bolused with IV insulin.  Infectious workup sent and broad spectrum abx started.  4/12 potassium improving with insulin.  Trending BMPs Q2H.  4/13 Gap closed, insulin gtt turned off  yesterday afternoon. Pt remains on D5 1/2 NS due to poor PO intake. Pt is complaining of abdominal pain. Abdominal ultrasound showed gallbladder sludge but no acute process. Will get CT scan of the abdomen today.       Interval History/Significant Events: Pt with poor appetite, nausea, and abdominal pain. Will get CT scan of the abdomen today. Remains on D5 1/2 NS due to poor PO intake.     Review of Systems   Constitutional:  Positive for appetite change. Negative for fever.   HENT:  Negative for rhinorrhea and sore throat.    Respiratory:  Negative for cough and shortness of breath.    Cardiovascular:  Negative for chest pain and palpitations.   Gastrointestinal:  Positive for abdominal pain, nausea and vomiting. Negative for diarrhea.   Genitourinary:  Negative for difficulty urinating and hematuria.   Musculoskeletal:  Negative for back pain and neck pain.   Skin:  Negative for rash and wound.   Neurological:  Negative for tremors and headaches.   All other systems reviewed and are negative.  Objective:     Vital Signs (Most Recent):  Temp: 97.7 °F (36.5 °C) (04/13/22 0400)  Pulse: 73 (04/13/22 0748)  Resp: 17 (04/13/22 0748)  BP: (!) 147/83 (04/13/22 0600)  SpO2: 95 % (04/13/22 0748)   Vital Signs (24h Range):  Temp:  [97.4 °F (36.3 °C)-98.9 °F (37.2 °C)] 97.7 °F (36.5 °C)  Pulse:  [69-84] 73  Resp:  [12-26] 17  SpO2:  [93 %-98 %] 95 %  BP: (113-164)/(56-84) 147/83   Weight: 81.6 kg (180 lb)  Body mass index is 25.1 kg/m².      Intake/Output Summary (Last 24 hours) at 4/13/2022 0904  Last data filed at 4/12/2022 1818  Gross per 24 hour   Intake 1204.19 ml   Output 700 ml   Net 504.19 ml       Physical Exam  Vitals and nursing note reviewed.   Constitutional:       Appearance: He is ill-appearing.   HENT:      Mouth/Throat:      Mouth: Mucous membranes are moist.   Cardiovascular:      Rate and Rhythm: Normal rate and regular rhythm.      Pulses: Normal pulses.      Heart sounds: Normal heart sounds.    Pulmonary:      Effort: Pulmonary effort is normal.      Breath sounds: Normal breath sounds.   Abdominal:      General: Abdomen is flat. Bowel sounds are normal.      Palpations: Abdomen is soft.      Tenderness: There is abdominal tenderness.   Musculoskeletal:         General: Normal range of motion.   Skin:     General: Skin is warm and dry.      Comments: Skin tears to bilateral arms     Neurological:      General: No focal deficit present.      Mental Status: He is alert and oriented to person, place, and time.       Vents:     Lines/Drains/Airways       Drain  Duration             Male External Urinary Catheter 04/11/22 2223 Medium 1 day              Peripheral Intravenous Line  Duration                  Peripheral IV - Single Lumen 04/11/22 2111 18 G Left Antecubital 1 day         Peripheral IV - Single Lumen 04/11/22 2337 22 G Right Hand 1 day         Peripheral IV - Single Lumen 04/11/22 2338 20 G Right Antecubital 1 day                  Significant Labs:    CBC/Anemia Profile:  Recent Labs   Lab 04/11/22 2133 04/11/22 2229 04/12/22 0213 04/12/22 0407 04/13/22  0426   WBC 17.05*  --   --  16.18* 15.27*   HGB 12.1*  --   --  10.2* 10.4*   HCT 42.0   < > 36 32.6* 32.2*     --   --  227 215   MCV 99*  --   --  92 91   RDW 15.7*  --   --  16.2* 16.7*    < > = values in this interval not displayed.        Chemistries:  Recent Labs   Lab 04/11/22 2133 04/11/22 2337 04/12/22  0407 04/12/22  0618 04/12/22 2002 04/13/22  0040 04/13/22  0426   *   < >  --    < > 140 139 138   K 7.0*   < >  --    < > 3.8 3.6 3.8   CL 91*   < >  --    < > 104 105 104   CO2 6*   < >  --    < > 28 26 25   BUN 42*   < >  --    < > 28* 26* 23   CREATININE 2.3*   < >  --    < > 1.4 1.3 1.2   CALCIUM 9.3   < >  --    < > 9.1 8.5* 8.4*   ALBUMIN 3.0*  --   --   --   --   --   --    PROT 7.4  --   --   --   --   --   --    BILITOT 0.4  --   --   --   --   --   --    ALKPHOS 151*  --   --   --   --   --   --    ALT 25  --    --   --   --   --   --    AST 20  --   --   --   --   --   --    MG  --   --  1.7  --   --   --  1.5*   PHOS  --   --  2.8  --   --   --  3.2    < > = values in this interval not displayed.       All pertinent labs within the past 24 hours have been reviewed.    Significant Imaging:  I have reviewed all pertinent imaging results/findings within the past 24 hours.      ABG  Recent Labs   Lab 04/12/22  0213   PH 7.222*   PO2 59   PCO2 17.4*   HCO3 7.1*   BE -21     Assessment/Plan:     Neuro  Focal seizures  History of seizures on lacosamide.      -- Continue home lacosamide    Cardiac/Vascular  Coronary artery disease  Continue home asa/plavix/statin.      Paroxysmal atrial fibrillation  History of afib.      -- Continue home amio  -- Continue home eliquis, ASA, Plavix    Essential hypertension  Borderline hypotensive on admission.  Also with BRENDAN.  Losartan on hold.     --Restart Metoprolol    Renal/  Lactic acidosis  LA 10.3 on hospital admission.  Concern for infectious process given WBC on admission 17.05, on 4/10 prior to discharge WBC 6.82. CXR with bilateral interstitial pattern, no focal areas of consolidation.      -- Procalcitonin 1.18  -- Blood cultures NGTD  -- Antibiotics discontinued      BRENDAN (acute kidney injury)  Likely secondary to dehydration in the setting of DKA.  Baseline Cr per chart review ~1.0.  Cr on admission 2.3.     -- Continue IVF  -- Renal dose all medications  -- Avoid nephrotoxins    Endocrine  * Diabetic ketoacidosis without coma associated with type 2 diabetes mellitus  Unclear inciting etiology missed insulin doses vs sepsis.    Recent admission 4/5 for DKA and [place] [facility] [place]  Per family malaise since discharge with vomiting starting 4/10.  BG at home greater unreadable.  Given 14 units subQ insulin at that time.  In ER BG found to be 1015 with pCO2 6 and anion gap 35 and hyperkalemia.  Started on insulin gtt in ED and given 20 units subQ long acting insulin.      --  Insulin gtt discontinued  -- Restart home Detemir 8 units at bedtime  -- Aspart with meals on hold due to poor intake  -- Moderate dose SSI  -- Last Hbg A1C done 4/5 was 9.7  -- Continue D5 1/2 NS due to poor intake      Other  Goals of care, counseling/discussion  Advance Care Planning     Code Status  Verified code status with daughter by telephone.  Confirms DNR status.              Critical Care Daily Checklist:    A: Awake: RASS Goal/Actual Goal:    Actual: Long Agitation Sedation Scale (RASS): Alert and calm   B: Spontaneous Breathing Trial Performed?     C: SAT & SBT Coordinated?  N/A                      D: Delirium: CAM-ICU Overall CAM-ICU: Negative   E: Early Mobility Performed? Yes   F: Feeding Goal:    Status:     Current Diet Order   Procedures    Diet diabetic [facility]; 2000 Calorie     Order Specific Question:   Indicate patient location for additional diet options:     Answer:   [facility]     Order Specific Question:   Total calories:     Answer:   2000 Calorie      AS: Analgesia/Sedation N/A   T: Thromboembolic Prophylaxis Eliquis   H: HOB > 300 Yes   U: Stress Ulcer Prophylaxis (if needed) Protonix   G: Glucose Control 140-180   B: Bowel Function Stool Occurrence: 0   I: Indwelling Catheter (Lines & Mccord) Necessity Mccord, PIV   D: De-escalation of Antimicrobials/Pharmacotherapies Yes    Plan for the day/ETD CT abdomen    Code Status:  Family/Goals of Care: DNR  Ongoing     Critical Care Time: 30 minutes  Critical secondary to Patient has a condition that poses threat to life and bodily function: DKA, BRENDAN      Critical care was time spent personally by me on the following activities: development of treatment plan with patient or surrogate and bedside caregivers, discussions with consultants, evaluation of patient's response to treatment, examination of patient, ordering and performing treatments and interventions, ordering and review of laboratory studies, ordering and review of  radiographic studies, pulse oximetry, re-evaluation of patient's condition. This critical care time did not overlap with that of any other provider or involve time for any procedures.     Richelle Pandey DNP  Critical Care Medicine  [facility] - Cardiac Medical ICU

## 2022-04-13 NOTE — PLAN OF CARE
CMICU DAILY GOALS       A: Awake    RASS: Goal -    Actual -     Restraint necessity:    B: Breathe   SBT: Not intubated   C: Coordinate A & B, analgesics/sedatives   Pain: managed    SAT: Not intubated  D: Delirium   CAM-ICU: Overall CAM-ICU: Negative  E: Early(intubated/ Progressive (non-intubated) Mobility   MOVE Screen: Pass   Activity: Activity Management: Arm raise - L1, Heel slide - L1, Leg kicks - L2  FAS: Feeding/Nutrition   Diet order: Diet/Nutrition Received: consistent carb/diabetic diet,    T: Thrombus   DVT prophylaxis: VTE Required Core Measure: Pharmacological prophylaxis initiated/maintained  H: HOB Elevation   Head of Bed (HOB) Positioning: HOB at 30-45 degrees  U: Ulcer Prophylaxis   GI: yes  G: Glucose control   managed Glycemic Management: blood glucose monitored  S: Skin   Bathing/Skin Care: linen changed  Device Skin Pressure Protection: absorbent pad utilized/changed  Pressure Reduction Devices: foam padding utilized, specialty bed utilized  Pressure Reduction Techniques: weight shift assistance provided  Skin Protection: adhesive use limited, tubing/devices free from skin contact  B: Bowel Function   constipation   I: Indwelling Catheters   Mccord necessity:     CVC necessity: No  D: De-escalation Antibiotics   No    Family/Goals of care/Code Status   Code Status: DNR    24H Vital Sign Range  Temp:  [96.7 °F (35.9 °C)-98.2 °F (36.8 °C)]   Pulse:  [59-83]   Resp:  [12-25]   BP: (117-164)/(60-84)   SpO2:  [93 %-98 %]      Shift Events   No acute events throughout shift Pt went down for CT abdomen/pelvis. Diphenhydramine given to premedicate and no signs of allergic reaction. Pt did not want any solid food, but did have liquid diet today. D5/0.45NS gtt discontinued during shift.     VS and assessment per flow sheet, patient progressing towards goals as tolerated, plan of care reviewed with family, all concerns addressed, will continue to monitor.    Arik Mulligan

## 2022-04-13 NOTE — ASSESSMENT & PLAN NOTE
Unclear inciting etiology missed insulin doses vs sepsis.    Recent admission 4/5 for DKA and [place] [facility] [place].  Per family malaise since discharge with vomiting starting 4/10.  BG at home greater unreadable.  Given 14 units subQ insulin at that time.  In ER BG found to be 1015 with pCO2 6 and anion gap 35 and hyperkalemia.  Started on insulin gtt in ED and given 20 units subQ long acting insulin.      -- Insulin gtt discontinued  -- Restart home Detemir 8 units at bedtime  -- Aspart with meals on hold due to poor intake  -- Moderate dose SSI  -- Last Hbg A1C done 4/5 was 9.7  -- Continue D5 1/2 NS due to poor intake

## 2022-04-13 NOTE — SUBJECTIVE & OBJECTIVE
Interval History/Significant Events: Pt with poor appetite, nausea, and abdominal pain. Will get CT scan of the abdomen today. Remains on D5 1/2 NS due to poor PO intake.     Review of Systems   Constitutional:  Positive for appetite change. Negative for fever.   HENT:  Negative for rhinorrhea and sore throat.    Respiratory:  Negative for cough and shortness of breath.    Cardiovascular:  Negative for chest pain and palpitations.   Gastrointestinal:  Positive for abdominal pain, nausea and vomiting. Negative for diarrhea.   Genitourinary:  Negative for difficulty urinating and hematuria.   Musculoskeletal:  Negative for back pain and neck pain.   Skin:  Negative for rash and wound.   Neurological:  Negative for tremors and headaches.   All other systems reviewed and are negative.  Objective:     Vital Signs (Most Recent):  Temp: 97.7 °F (36.5 °C) (04/13/22 0400)  Pulse: 73 (04/13/22 0748)  Resp: 17 (04/13/22 0748)  BP: (!) 147/83 (04/13/22 0600)  SpO2: 95 % (04/13/22 0748)   Vital Signs (24h Range):  Temp:  [97.4 °F (36.3 °C)-98.9 °F (37.2 °C)] 97.7 °F (36.5 °C)  Pulse:  [69-84] 73  Resp:  [12-26] 17  SpO2:  [93 %-98 %] 95 %  BP: (113-164)/(56-84) 147/83   Weight: 81.6 kg (180 lb)  Body mass index is 25.1 kg/m².      Intake/Output Summary (Last 24 hours) at 4/13/2022 0904  Last data filed at 4/12/2022 1818  Gross per 24 hour   Intake 1204.19 ml   Output 700 ml   Net 504.19 ml       Physical Exam  Vitals and nursing note reviewed.   Constitutional:       Appearance: He is ill-appearing.   HENT:      Mouth/Throat:      Mouth: Mucous membranes are moist.   Cardiovascular:      Rate and Rhythm: Normal rate and regular rhythm.      Pulses: Normal pulses.      Heart sounds: Normal heart sounds.   Pulmonary:      Effort: Pulmonary effort is normal.      Breath sounds: Normal breath sounds.   Abdominal:      General: Abdomen is flat. Bowel sounds are normal.      Palpations: Abdomen is soft.      Tenderness: There is  abdominal tenderness.   Musculoskeletal:         General: Normal range of motion.   Skin:     General: Skin is warm and dry.      Comments: Skin tears to bilateral arms     Neurological:      General: No focal deficit present.      Mental Status: He is alert and oriented to person, place, and time.       Vents:     Lines/Drains/Airways       Drain  Duration             Male External Urinary Catheter 04/11/22 2223 Medium 1 day              Peripheral Intravenous Line  Duration                  Peripheral IV - Single Lumen 04/11/22 2111 18 G Left Antecubital 1 day         Peripheral IV - Single Lumen 04/11/22 2337 22 G Right Hand 1 day         Peripheral IV - Single Lumen 04/11/22 2338 20 G Right Antecubital 1 day                  Significant Labs:    CBC/Anemia Profile:  Recent Labs   Lab 04/11/22 2133 04/11/22 2229 04/12/22 0213 04/12/22 0407 04/13/22 0426   WBC 17.05*  --   --  16.18* 15.27*   HGB 12.1*  --   --  10.2* 10.4*   HCT 42.0   < > 36 32.6* 32.2*     --   --  227 215   MCV 99*  --   --  92 91   RDW 15.7*  --   --  16.2* 16.7*    < > = values in this interval not displayed.        Chemistries:  Recent Labs   Lab 04/11/22 2133 04/11/22 2337 04/12/22 0407 04/12/22  0618 04/12/22 2002 04/13/22  0040 04/13/22  0426   *   < >  --    < > 140 139 138   K 7.0*   < >  --    < > 3.8 3.6 3.8   CL 91*   < >  --    < > 104 105 104   CO2 6*   < >  --    < > 28 26 25   BUN 42*   < >  --    < > 28* 26* 23   CREATININE 2.3*   < >  --    < > 1.4 1.3 1.2   CALCIUM 9.3   < >  --    < > 9.1 8.5* 8.4*   ALBUMIN 3.0*  --   --   --   --   --   --    PROT 7.4  --   --   --   --   --   --    BILITOT 0.4  --   --   --   --   --   --    ALKPHOS 151*  --   --   --   --   --   --    ALT 25  --   --   --   --   --   --    AST 20  --   --   --   --   --   --    MG  --   --  1.7  --   --   --  1.5*   PHOS  --   --  2.8  --   --   --  3.2    < > = values in this interval not displayed.       All pertinent labs within  the past 24 hours have been reviewed.    Significant Imaging:  I have reviewed all pertinent imaging results/findings within the past 24 hours.

## 2022-04-13 NOTE — ASSESSMENT & PLAN NOTE
Borderline hypotensive on admission.  Also with BRENDAN.  Losartan on hold.     --Restart Metoprolol

## 2022-04-14 PROBLEM — E11.11 DIABETIC KETOACIDOSIS WITH COMA ASSOCIATED WITH TYPE 2 DIABETES MELLITUS: Chronic | Status: RESOLVED | Noted: 2022-01-29 | Resolved: 2022-04-14

## 2022-04-14 LAB
ANION GAP SERPL CALC-SCNC: 8 MMOL/L (ref 8–16)
BASOPHILS # BLD AUTO: 0.04 K/UL (ref 0–0.2)
BASOPHILS NFR BLD: 0.4 % (ref 0–1.9)
BUN SERPL-MCNC: 16 MG/DL (ref 8–23)
CALCIUM SERPL-MCNC: 8 MG/DL (ref 8.7–10.5)
CHLORIDE SERPL-SCNC: 101 MMOL/L (ref 95–110)
CO2 SERPL-SCNC: 27 MMOL/L (ref 23–29)
CREAT SERPL-MCNC: 1 MG/DL (ref 0.5–1.4)
DIFFERENTIAL METHOD: ABNORMAL
EOSINOPHIL # BLD AUTO: 0.5 K/UL (ref 0–0.5)
EOSINOPHIL NFR BLD: 5.2 % (ref 0–8)
ERYTHROCYTE [DISTWIDTH] IN BLOOD BY AUTOMATED COUNT: 16.9 % (ref 11.5–14.5)
EST. GFR  (AFRICAN AMERICAN): >60 ML/MIN/1.73 M^2
EST. GFR  (NON AFRICAN AMERICAN): >60 ML/MIN/1.73 M^2
GLUCOSE SERPL-MCNC: 196 MG/DL (ref 70–110)
HCT VFR BLD AUTO: 33.9 % (ref 40–54)
HGB BLD-MCNC: 11 G/DL (ref 14–18)
IMM GRANULOCYTES # BLD AUTO: 0.04 K/UL (ref 0–0.04)
IMM GRANULOCYTES NFR BLD AUTO: 0.4 % (ref 0–0.5)
LYMPHOCYTES # BLD AUTO: 1.8 K/UL (ref 1–4.8)
LYMPHOCYTES NFR BLD: 18.3 % (ref 18–48)
MAGNESIUM SERPL-MCNC: 1.8 MG/DL (ref 1.6–2.6)
MCH RBC QN AUTO: 29.8 PG (ref 27–31)
MCHC RBC AUTO-ENTMCNC: 32.4 G/DL (ref 32–36)
MCV RBC AUTO: 92 FL (ref 82–98)
MONOCYTES # BLD AUTO: 0.6 K/UL (ref 0.3–1)
MONOCYTES NFR BLD: 5.9 % (ref 4–15)
NEUTROPHILS # BLD AUTO: 6.9 K/UL (ref 1.8–7.7)
NEUTROPHILS NFR BLD: 69.8 % (ref 38–73)
NRBC BLD-RTO: 0 /100 WBC
PHOSPHATE SERPL-MCNC: 2.7 MG/DL (ref 2.7–4.5)
PLATELET # BLD AUTO: 194 K/UL (ref 150–450)
PMV BLD AUTO: 9.7 FL (ref 9.2–12.9)
POCT GLUCOSE: 122 MG/DL (ref 70–110)
POCT GLUCOSE: 132 MG/DL (ref 70–110)
POCT GLUCOSE: 140 MG/DL (ref 70–110)
POCT GLUCOSE: 156 MG/DL (ref 70–110)
POCT GLUCOSE: 190 MG/DL (ref 70–110)
POCT GLUCOSE: 233 MG/DL (ref 70–110)
POCT GLUCOSE: 249 MG/DL (ref 70–110)
POTASSIUM SERPL-SCNC: 4 MMOL/L (ref 3.5–5.1)
RBC # BLD AUTO: 3.69 M/UL (ref 4.6–6.2)
SODIUM SERPL-SCNC: 136 MMOL/L (ref 136–145)
WBC # BLD AUTO: 9.87 K/UL (ref 3.9–12.7)

## 2022-04-14 PROCEDURE — 83735 ASSAY OF MAGNESIUM: CPT

## 2022-04-14 PROCEDURE — 99233 PR SUBSEQUENT HOSPITAL CARE,LEVL III: ICD-10-PCS | Mod: ,,, | Performed by: INTERNAL MEDICINE

## 2022-04-14 PROCEDURE — 99222 PR INITIAL HOSPITAL CARE,LEVL II: ICD-10-PCS | Mod: GC,,, | Performed by: INTERNAL MEDICINE

## 2022-04-14 PROCEDURE — 63600175 PHARM REV CODE 636 W HCPCS: Performed by: NURSE PRACTITIONER

## 2022-04-14 PROCEDURE — 25000003 PHARM REV CODE 250: Performed by: INTERNAL MEDICINE

## 2022-04-14 PROCEDURE — 25000003 PHARM REV CODE 250: Performed by: STUDENT IN AN ORGANIZED HEALTH CARE EDUCATION/TRAINING PROGRAM

## 2022-04-14 PROCEDURE — 20600001 HC STEP DOWN PRIVATE ROOM

## 2022-04-14 PROCEDURE — 63600175 PHARM REV CODE 636 W HCPCS: Performed by: INTERNAL MEDICINE

## 2022-04-14 PROCEDURE — 85025 COMPLETE CBC W/AUTO DIFF WBC: CPT

## 2022-04-14 PROCEDURE — 80048 BASIC METABOLIC PNL TOTAL CA: CPT | Performed by: NURSE PRACTITIONER

## 2022-04-14 PROCEDURE — 84100 ASSAY OF PHOSPHORUS: CPT

## 2022-04-14 PROCEDURE — 25000003 PHARM REV CODE 250: Performed by: NURSE PRACTITIONER

## 2022-04-14 PROCEDURE — 99233 SBSQ HOSP IP/OBS HIGH 50: CPT | Mod: ,,, | Performed by: INTERNAL MEDICINE

## 2022-04-14 PROCEDURE — 99222 1ST HOSP IP/OBS MODERATE 55: CPT | Mod: GC,,, | Performed by: INTERNAL MEDICINE

## 2022-04-14 PROCEDURE — C9399 UNCLASSIFIED DRUGS OR BIOLOG: HCPCS | Performed by: STUDENT IN AN ORGANIZED HEALTH CARE EDUCATION/TRAINING PROGRAM

## 2022-04-14 PROCEDURE — 94761 N-INVAS EAR/PLS OXIMETRY MLT: CPT

## 2022-04-14 PROCEDURE — 25000003 PHARM REV CODE 250

## 2022-04-14 RX ORDER — ONDANSETRON 2 MG/ML
8 INJECTION INTRAMUSCULAR; INTRAVENOUS EVERY 8 HOURS PRN
Status: DISCONTINUED | OUTPATIENT
Start: 2022-04-14 | End: 2022-05-03 | Stop reason: HOSPADM

## 2022-04-14 RX ORDER — PROCHLORPERAZINE EDISYLATE 5 MG/ML
5 INJECTION INTRAMUSCULAR; INTRAVENOUS EVERY 6 HOURS PRN
Status: DISCONTINUED | OUTPATIENT
Start: 2022-04-14 | End: 2022-05-03 | Stop reason: HOSPADM

## 2022-04-14 RX ORDER — TALC
6 POWDER (GRAM) TOPICAL NIGHTLY PRN
Status: DISCONTINUED | OUTPATIENT
Start: 2022-04-14 | End: 2022-05-03 | Stop reason: HOSPADM

## 2022-04-14 RX ORDER — SIMETHICONE 80 MG
1 TABLET,CHEWABLE ORAL 3 TIMES DAILY PRN
Status: DISCONTINUED | OUTPATIENT
Start: 2022-04-14 | End: 2022-05-03 | Stop reason: HOSPADM

## 2022-04-14 RX ORDER — CLONIDINE HYDROCHLORIDE 0.1 MG/1
0.1 TABLET ORAL 3 TIMES DAILY PRN
Status: DISCONTINUED | OUTPATIENT
Start: 2022-04-14 | End: 2022-05-03 | Stop reason: HOSPADM

## 2022-04-14 RX ORDER — ACETAMINOPHEN 500 MG
500 TABLET ORAL EVERY 6 HOURS PRN
Status: DISCONTINUED | OUTPATIENT
Start: 2022-04-14 | End: 2022-05-03 | Stop reason: HOSPADM

## 2022-04-14 RX ORDER — LACTULOSE 10 G/15ML
15 SOLUTION ORAL ONCE
Status: DISCONTINUED | OUTPATIENT
Start: 2022-04-14 | End: 2022-04-15

## 2022-04-14 RX ORDER — CALCIUM CARBONATE 200(500)MG
500 TABLET,CHEWABLE ORAL DAILY PRN
Status: DISCONTINUED | OUTPATIENT
Start: 2022-04-14 | End: 2022-05-03 | Stop reason: HOSPADM

## 2022-04-14 RX ORDER — SENNOSIDES 8.6 MG/1
8.6 TABLET ORAL DAILY PRN
Status: DISCONTINUED | OUTPATIENT
Start: 2022-04-14 | End: 2022-05-03 | Stop reason: HOSPADM

## 2022-04-14 RX ORDER — DIPHENHYDRAMINE HCL 25 MG
25 CAPSULE ORAL EVERY 6 HOURS PRN
Status: DISCONTINUED | OUTPATIENT
Start: 2022-04-14 | End: 2022-05-03 | Stop reason: HOSPADM

## 2022-04-14 RX ORDER — INSULIN ASPART 100 [IU]/ML
8 INJECTION, SOLUTION INTRAVENOUS; SUBCUTANEOUS
Status: DISCONTINUED | OUTPATIENT
Start: 2022-04-14 | End: 2022-04-15

## 2022-04-14 RX ADMIN — INSULIN ASPART 8 UNITS: 100 INJECTION, SOLUTION INTRAVENOUS; SUBCUTANEOUS at 04:04

## 2022-04-14 RX ADMIN — INSULIN ASPART 2 UNITS: 100 INJECTION, SOLUTION INTRAVENOUS; SUBCUTANEOUS at 12:04

## 2022-04-14 RX ADMIN — LACOSAMIDE 100 MG: 100 TABLET, FILM COATED ORAL at 10:04

## 2022-04-14 RX ADMIN — PROCHLORPERAZINE EDISYLATE 5 MG: 5 INJECTION INTRAMUSCULAR; INTRAVENOUS at 10:04

## 2022-04-14 RX ADMIN — ATORVASTATIN CALCIUM 40 MG: 20 TABLET, FILM COATED ORAL at 10:04

## 2022-04-14 RX ADMIN — LACOSAMIDE 100 MG: 100 TABLET, FILM COATED ORAL at 09:04

## 2022-04-14 RX ADMIN — PANTOPRAZOLE SODIUM 40 MG: 40 TABLET, DELAYED RELEASE ORAL at 10:04

## 2022-04-14 RX ADMIN — POLYETHYLENE GLYCOL 3350 17 G: 17 POWDER, FOR SOLUTION ORAL at 10:04

## 2022-04-14 RX ADMIN — AMIODARONE HYDROCHLORIDE 200 MG: 200 TABLET ORAL at 10:04

## 2022-04-14 RX ADMIN — ONDANSETRON 8 MG: 2 INJECTION INTRAMUSCULAR; INTRAVENOUS at 10:04

## 2022-04-14 RX ADMIN — APIXABAN 5 MG: 5 TABLET, FILM COATED ORAL at 09:04

## 2022-04-14 RX ADMIN — SENNOSIDES AND DOCUSATE SODIUM 1 TABLET: 50; 8.6 TABLET ORAL at 10:04

## 2022-04-14 RX ADMIN — APIXABAN 5 MG: 5 TABLET, FILM COATED ORAL at 10:04

## 2022-04-14 RX ADMIN — ONDANSETRON 4 MG: 2 INJECTION INTRAMUSCULAR; INTRAVENOUS at 08:04

## 2022-04-14 RX ADMIN — SENNOSIDES AND DOCUSATE SODIUM 1 TABLET: 50; 8.6 TABLET ORAL at 09:04

## 2022-04-14 RX ADMIN — INSULIN DETEMIR 10 UNITS: 100 INJECTION, SOLUTION SUBCUTANEOUS at 09:04

## 2022-04-14 RX ADMIN — INSULIN ASPART 4 UNITS: 100 INJECTION, SOLUTION INTRAVENOUS; SUBCUTANEOUS at 06:04

## 2022-04-14 RX ADMIN — CLOPIDOGREL 75 MG: 75 TABLET, FILM COATED ORAL at 10:04

## 2022-04-14 NOTE — NURSING
Patient being downgraded to stepdown unit. Report received from Lynn OLIVIER, awaiting patient arrival to unit.

## 2022-04-14 NOTE — ASSESSMENT & PLAN NOTE
Resolved  · Likely secondary to dehydration in the setting of DKA.  Baseline Cr per chart review ~1.0.  Cr on admission 2.3.

## 2022-04-14 NOTE — SUBJECTIVE & OBJECTIVE
Interval History/Significant Events:  No acute events noted overnight.  CT-abdomen/pelvis negative for acute abnormalities.  Tolerating liquid diet and requesting Boost.  Had some nausea this morning not relieved with ondansetron.  Abdominal pain is resolved.    Review of Systems   Gastrointestinal:  Positive for nausea. Negative for abdominal pain and vomiting.   All other systems reviewed and are negative.  Objective:     Vital Signs (Most Recent):  Temp: 97.2 °F (36.2 °C) (04/14/22 0701)  Pulse: 60 (04/14/22 0700)  Resp: 13 (04/14/22 0700)  BP: 127/68 (04/14/22 0700)  SpO2: (!) 94 % (04/14/22 0700)   Vital Signs (24h Range):  Temp:  [96.7 °F (35.9 °C)-98 °F (36.7 °C)] 97.2 °F (36.2 °C)  Pulse:  [59-72] 60  Resp:  [12-25] 13  SpO2:  [93 %-99 %] 94 %  BP: (117-164)/(64-98) 127/68   Weight: 81.6 kg (180 lb)  Body mass index is 25.1 kg/m².      Intake/Output Summary (Last 24 hours) at 4/14/2022 0754  Last data filed at 4/14/2022 0400  Gross per 24 hour   Intake 2327.29 ml   Output 1775 ml   Net 552.29 ml       Physical Exam  Vitals and nursing note reviewed.   Constitutional:       General: He is not in acute distress.     Appearance: Normal appearance. He is normal weight. He is not ill-appearing, toxic-appearing or diaphoretic.   HENT:      Head: Normocephalic and atraumatic.      Right Ear: External ear normal.      Left Ear: External ear normal.      Nose: Nose normal.   Cardiovascular:      Rate and Rhythm: Normal rate and regular rhythm.      Pulses: Normal pulses.      Heart sounds: Normal heart sounds. No murmur heard.    No friction rub. No gallop.   Pulmonary:      Effort: Pulmonary effort is normal. No respiratory distress.      Breath sounds: Normal breath sounds. No wheezing, rhonchi or rales.   Abdominal:      General: Abdomen is flat. Bowel sounds are normal. There is no distension.      Palpations: Abdomen is soft.      Tenderness: There is no abdominal tenderness. There is no guarding or rebound.    Musculoskeletal:         General: No swelling. Normal range of motion.   Skin:     General: Skin is warm and dry.      Coloration: Skin is not jaundiced.   Neurological:      General: No focal deficit present.      Mental Status: He is alert and oriented to person, place, and time. Mental status is at baseline.   Psychiatric:         Mood and Affect: Mood normal.         Behavior: Behavior normal.         Thought Content: Thought content normal.         Judgment: Judgment normal.     Vents:     Lines/Drains/Airways       Drain  Duration             Male External Urinary Catheter 04/11/22 2223 Medium 2 days              Peripheral Intravenous Line  Duration                  Peripheral IV - Single Lumen 04/11/22 2111 18 G Left Antecubital 2 days         Peripheral IV - Single Lumen 04/11/22 2337 22 G Right Hand 2 days         Peripheral IV - Single Lumen 04/11/22 2338 20 G Right Antecubital 2 days                  Significant Labs:    CBC/Anemia Profile:  Recent Labs   Lab 04/13/22  0426 04/14/22  0305   WBC 15.27* 9.87   HGB 10.4* 11.0*   HCT 32.2* 33.9*    194   MCV 91 92   RDW 16.7* 16.9*        Chemistries:  Recent Labs   Lab 04/13/22  0040 04/13/22  0426 04/14/22  0305    138 136   K 3.6 3.8 4.0    104 101   CO2 26 25 27   BUN 26* 23 16   CREATININE 1.3 1.2 1.0   CALCIUM 8.5* 8.4* 8.0*   MG  --  1.5* 1.8   PHOS  --  3.2 2.7       All pertinent labs within the past 24 hours have been reviewed.    Significant Imaging:  I have reviewed all pertinent imaging results/findings within the past 24 hours.

## 2022-04-14 NOTE — ASSESSMENT & PLAN NOTE
Key History and Diagnostic Findings  - A1c of 9.7 on 2022 from 11.5 on 2022    - Home Regimen: Levemir 8 units daily, aspart 8 units TIDWM  - Weight based dosin kg x 0.5 = 41 TDD x 0.5 = 20 basal / 20 prandial  - 1700/TDD = 41 (estimated insulin sensitivity factor)  - 450/TDD = 11 (estimated starting carb ratio for prandial dosing)  - Glucose Goals: 160-200mg/dL  - 24 hour glucose trend: Variable control over 24 hours in the 200s and 300s    Plan  - Start Levemir 10 units twice daily  - Continue aspart 8 units TIDWM with moderate correction  - After speaking with daughter, she wishes to pursue skilled nursing facility for discharge as she states he cannot be adequately taking care of at home      - Hypoglycemia protocol in place  - If blood glucose greater than 300, please ask patient not to eat food or drink anything other than water until correctional insulin has brought it back below 250  - Please ensure patient receives their p.r.n. insulin aspart if they eat a snack with more than 30 g of carbohydrate

## 2022-04-14 NOTE — PROGRESS NOTES
[facility] Card[facility] Medical [facility]  Critical Care Medicine  Progress Note    Patient Name: Kamar Muñoz  MRN: [id number]  Admission Date: 4/11/2022  Hospital Length of Stay: 3 days  Code Status: DNR  Attending Provider: Madelin Ocasio MD  Primary Care Provider: Basim Guerrero MD   Principal Problem: Diabetic ketoacidosis without coma associated with type 2 diabetes mellitus    Subjective:     HPI:  Mr. Kamar Muñoz is a 76 y.o. male with a past medical history of HTN, Type 2 DM, CAD, STEMI, HLD, paroxysmal Afib, CVA, seizures and recurrent DKA.  [place] [facility] [place] with elevated blood glucose.  [place] [facility] [place] after being admitted for DKA on 4/5.  At time of exam patient is alert but altered and unable to provide any history.  Spoke with patient's daughter who states she is a primary caregiver at home and obtained history as well as review of chart.  Per family he was not feeling well after discharge to home and vomited several times on 4/11. Unknown characteristics of emesis. Finger stick at home read High with unreadable blood glucose.  At this time daughter gave him 14 units of insulin and sublingual zofran. Other than malaise and nausea patient was not exhibiting any other signs/symptoms at home.  In ER BG found to be 1015 with pCO2 6 and anion gap 35.  Hyperkalemic with K 7.0 and some EKG changes when compared to previous EKG.  Given calcium gluconate, and started on insulin gtt as well as subQ long acting insulin.     Critical Care Medicine consulted for DKA and admitted to MICU.        Hospital/ICU Course:  Admitted to MICU on 4/11 for DKA with BG >1000, pCO2 6, AG 35, and hyperkalemia.  Given Calcium gluconate and started on insulin gtt in ED.  Hyperkalemia not improved on repeat labs and bolused with IV insulin.  Infectious workup sent and broad spectrum abx started.  4/12 potassium improving with insulin.  Trending BMPs Q2H.  4/13 Gap closed, insulin gtt turned off  yesterday afternoon. Pt remains on D5 1/2 NS due to poor PO intake. Pt is complaining of abdominal pain. Abdominal ultrasound showed gallbladder sludge but no acute process. Will get CT scan of the abdomen today.   4/14:  No acute events noted overnight.  Patient tolerating full liquid diet.  CT-abd/pelvis without acute abnormalities.  Capillary glucose is stable      Interval History/Significant Events:  No acute events noted overnight.  CT-abdomen/pelvis negative for acute abnormalities.  Tolerating liquid diet and requesting Boost.  Had some nausea this morning not relieved with ondansetron.  Abdominal pain is resolved.    Review of Systems   Gastrointestinal:  Positive for nausea. Negative for abdominal pain and vomiting.   All other systems reviewed and are negative.  Objective:     Vital Signs (Most Recent):  Temp: 97.2 °F (36.2 °C) (04/14/22 0701)  Pulse: 60 (04/14/22 0700)  Resp: 13 (04/14/22 0700)  BP: 127/68 (04/14/22 0700)  SpO2: (!) 94 % (04/14/22 0700)   Vital Signs (24h Range):  Temp:  [96.7 °F (35.9 °C)-98 °F (36.7 °C)] 97.2 °F (36.2 °C)  Pulse:  [59-72] 60  Resp:  [12-25] 13  SpO2:  [93 %-99 %] 94 %  BP: (117-164)/(64-98) 127/68   Weight: 81.6 kg (180 lb)  Body mass index is 25.1 kg/m².      Intake/Output Summary (Last 24 hours) at 4/14/2022 0754  Last data filed at 4/14/2022 0400  Gross per 24 hour   Intake 2327.29 ml   Output 1775 ml   Net 552.29 ml       Physical Exam  Vitals and nursing note reviewed.   Constitutional:       General: He is not in acute distress.     Appearance: Normal appearance. He is normal weight. He is not ill-appearing, toxic-appearing or diaphoretic.   HENT:      Head: Normocephalic and atraumatic.      Right Ear: External ear normal.      Left Ear: External ear normal.      Nose: Nose normal.   Cardiovascular:      Rate and Rhythm: Normal rate and regular rhythm.      Pulses: Normal pulses.      Heart sounds: Normal heart sounds. No murmur heard.    No friction rub. No  gallop.   Pulmonary:      Effort: Pulmonary effort is normal. No respiratory distress.      Breath sounds: Normal breath sounds. No wheezing, rhonchi or rales.   Abdominal:      General: Abdomen is flat. Bowel sounds are normal. There is no distension.      Palpations: Abdomen is soft.      Tenderness: There is no abdominal tenderness. There is no guarding or rebound.   Musculoskeletal:         General: No swelling. Normal range of motion.   Skin:     General: Skin is warm and dry.      Coloration: Skin is not jaundiced.   Neurological:      General: No focal deficit present.      Mental Status: He is alert and oriented to person, place, and time. Mental status is at baseline.   Psychiatric:         Mood and Affect: Mood normal.         Behavior: Behavior normal.         Thought Content: Thought content normal.         Judgment: Judgment normal.     Vents:     Lines/Drains/Airways       Drain  Duration             Male External Urinary Catheter 04/11/22 2223 Medium 2 days              Peripheral Intravenous Line  Duration                  Peripheral IV - Single Lumen 04/11/22 2111 18 G Left Antecubital 2 days         Peripheral IV - Single Lumen 04/11/22 2337 22 G Right Hand 2 days         Peripheral IV - Single Lumen 04/11/22 2338 20 G Right Antecubital 2 days                  Significant Labs:    CBC/Anemia Profile:  Recent Labs   Lab 04/13/22  0426 04/14/22  0305   WBC 15.27* 9.87   HGB 10.4* 11.0*   HCT 32.2* 33.9*    194   MCV 91 92   RDW 16.7* 16.9*        Chemistries:  Recent Labs   Lab 04/13/22  0040 04/13/22  0426 04/14/22  0305    138 136   K 3.6 3.8 4.0    104 101   CO2 26 25 27   BUN 26* 23 16   CREATININE 1.3 1.2 1.0   CALCIUM 8.5* 8.4* 8.0*   MG  --  1.5* 1.8   PHOS  --  3.2 2.7       All pertinent labs within the past 24 hours have been reviewed.    Significant Imaging:  I have reviewed all pertinent imaging results/findings within the past 24 hours.      ABG  Recent Labs   Lab  04/12/22  0213   PH 7.222*   PO2 59   PCO2 17.4*   HCO3 7.1*   BE -21     Assessment/Plan:     Neuro  Focal seizures  History of seizures on lacosamide.    · Continue home lacosamide    Cardiac/Vascular  Coronary artery disease  Stable  · Continue home regimen of aspirin, atorvastatin, clopidogrel, losartan, and metoprolol    Paroxysmal atrial fibrillation  History of afib.    · Continue home regimen of amiodarone, apixaban, and metoprolol    Essential hypertension  Blood pressure stable  · Continue home regimen of losartan and metoprolol    Renal/  Lactic acidosis  LA 10.3 on hospital admission.  Concern for infectious process given WBC on admission 17.05, on 4/10 prior to discharge WBC 6.82. CXR with bilateral interstitial pattern, no focal areas of consolidation.    · Procalcitonin 1.18  · Blood cultures NGTD  · Antibiotics discontinued    BRENDAN (acute kidney injury)  Resolved  · Likely secondary to dehydration in the setting of DKA.  Baseline Cr per chart review ~1.0.  Cr on admission 2.3.     Endocrine  * Diabetic ketoacidosis without coma associated with type 2 diabetes mellitus  Resolved  Unclear inciting etiology missed insulin doses vs sepsis.    Recent admission 4/5 for DKA and discharged from [facility] 4/10.  Per family malaise since discharge with vomiting starting 4/10.  BG at home greater unreadable.  Given 14 units subQ insulin at that time.  In ER BG found to be 1015 with pCO2 6 and anion gap 35 and hyperkalemia.  Started on insulin gtt in ED and given 20 units subQ long acting insulin.    · Insulin gtt discontinued  · Restart home Detemir 8 units at bedtime  · Aspart with meals on hold due to poor intake  · Moderate dose SS  · Last Hbg A1C done 4/5 was 9.7  · Continue D5 1/2 NS due to poor intake  · Endocrinology consulted for further recommendations      Palliative Care  Goals of care, counseling/discussion  Advance Care Planning     Code Status  Verified code status with daughter by telephone.  Confirms  DNR status.           Critical Care Time: 35 minutes  Critical secondary to Patient has a condition that poses threat to life and bodily function: Acute Renal Failure      Critical care was time spent personally by me on the following activities: development of treatment plan with patient or surrogate and bedside caregivers, discussions with consultants, evaluation of patient's response to treatment, examination of patient, ordering and performing treatments and interventions, ordering and review of laboratory studies, ordering and review of radiographic studies, pulse oximetry, re-evaluation of patient's condition. This critical care time did not overlap with that of any other provider or involve time for any procedures.         Madelin Ocasio M.D.  Department of Pulmonary Medicine  [facility]        N.B.: Portions of this note was dictated using M*Modal Fluency Direct--there may be voice recognition errors occasionally missed on review.

## 2022-04-14 NOTE — PLAN OF CARE
CMICU DAILY GOALS       A: Awake    RASS: Goal -    Actual -     Restraint necessity:    B: Breathe   SBT: Not intubated   C: Coordinate A & B, analgesics/sedatives   Pain: managed    SAT: Not intubated  D: Delirium   CAM-ICU: Overall CAM-ICU: Negative  E: Early(intubated/ Progressive (non-intubated) Mobility   MOVE Screen: Pass   Activity: Activity Management: Arm raise - L1, Rolling - L1  FAS: Feeding/Nutrition   Diet order: Diet/Nutrition Received: clear liquid,    T: Thrombus   DVT prophylaxis: VTE Required Core Measure: Pharmacological prophylaxis initiated/maintained  H: HOB Elevation   Head of Bed (HOB) Positioning: HOB elevated  U: Ulcer Prophylaxis   GI: yes  G: Glucose control   managed Glycemic Management: blood glucose monitored  S: Skin   Bathing/Skin Care: linen changed  Device Skin Pressure Protection: absorbent pad utilized/changed, adhesive use limited, positioning supports utilized, pressure points protected, skin-to-device areas padded, skin-to-skin areas padded  Pressure Reduction Devices: foam padding utilized, positioning supports utilized, specialty bed utilized  Pressure Reduction Techniques: frequent weight shift encouraged, weight shift assistance provided  Skin Protection: adhesive use limited, incontinence pads utilized, tubing/devices free from skin contact  B: Bowel Function   no issues   I: Indwelling Catheters   Mccord necessity:     CVC necessity: No  D: De-escalation Antibiotics   Yes    Family/Goals of care/Code Status   Code Status: DNR    24H Vital Sign Range  Temp:  [97.2 °F (36.2 °C)-98 °F (36.7 °C)]   Pulse:  [57-67]   Resp:  [12-29]   BP: (114-164)/(61-98)   SpO2:  [76 %-99 %]      Shift Events   No acute events throughout shift    VS and assessment per flow sheet, patient progressing towards goals as tolerated, plan of care reviewed with  Kamar Muñoz , all concerns addressed, will continue to monitor.    Lynn Mane

## 2022-04-14 NOTE — ASSESSMENT & PLAN NOTE
Resolved  Unclear inciting etiology missed insulin doses vs sepsis.    Recent admission 4/5 for DKA and [place] [facility] [place]  Per family malaise since discharge with vomiting starting 4/10.  BG at home greater unreadable.  Given 14 units subQ insulin at that time.  In ER BG found to be 1015 with pCO2 6 and anion gap 35 and hyperkalemia.  Started on insulin gtt in ED and given 20 units subQ long acting insulin.    · Insulin gtt discontinued  · Restart home Detemir 8 units at bedtime  · Aspart with meals on hold due to poor intake  · Moderate dose SS  · Last Hbg A1C done 4/5 was 9.7  · Continue D5 1/2 NS due to poor intake  · Endocrinology consulted for further recommendations

## 2022-04-14 NOTE — PROVIDER TRANSFER
Transfer Note    Brief History: 76 year old gentleman with hypertension, diabetes, CAD, and pAF here for DKA and acute renal failure.  Now resolved and off of insulin infusion.  He was just discharged from this facility the day prior to re-admission here, also for DKA.  Endocrinology was following along and offering recommendations; they are consulted and hopefully will see him at some point today.  He does have abdominal pain with nausea and vomiting, all of which has resolved.  CT-abd/pelvis was essentially benign except for large stool burden--he's being started on a bowel regimen today.  Tolerating a liquid diet with advancing as tolerated.    Of note, his wife was just admitted to [facility] yesterday for a fall with posterior head contusion.    Pending Studies: Endocrinology evaluation.    Barriers to Discharge:  None        Madelin Ocasio M.D.  Department of Pulmonary Medicine  [facility]  Spectralink: [id number]        N.B.: Portions of this note was dictated using M*Modal Fluency Direct--there may be voice recognition errors occasionally missed on review.

## 2022-04-14 NOTE — ASSESSMENT & PLAN NOTE
LA 10.3 on hospital admission.  Concern for infectious process given WBC on admission 17.05, on 4/10 prior to discharge WBC 6.82. CXR with bilateral interstitial pattern, no focal areas of consolidation.    · Procalcitonin 1.18  · Blood cultures NGTD  · Antibiotics discontinued

## 2022-04-14 NOTE — CONSULTS
[facility]  Endocrinology  Diabetes Consult Note    Consult Requested by: Madelin Ocasio MD   Reason for admit: Diabetic ketoacidosis without coma associated with type 2 diabetes mellitus    HISTORY OF PRESENT ILLNESS:  76 year old  male with T2DM, HTN, CAD, STEMI, HLD, paroxysmal Afib, CVA, seizures who presents for recurrent DKA.  He presented to [facility] ED on 4/11 with elevated blood glucose.  He was discharged from [facility] on 4/10 after being admitted for DKA on 4/5. Per family he was not feeling well after discharge to home and vomited several times on 4/11. Finger stick at home read High with unreadable blood glucose.  At this time daughter gave him 14 units of insulin and sublingual zofran. Other than malaise and nausea patient was not exhibiting any other signs/symptoms at home.    - In ER BG found to be 1015 with pCO2 6 and anion gap 35.  Hyperkalemic with K 7.0 and some EKG changes when compared to previous EKG.  Given calcium gluconate, and started on insulin gtt as well as subQ long acting insulin     - Endocrinology consulted for management of type 2 diabetes after resolution of DKA    - spoke with daughter on the telephone who states patient was discharged on 04/10/2022 with high glucose in the 400s which worsened after getting home and eating dinner; appropriate mealtime and correction insulin were given at that time. The next day patient's condition was worsening and they called EMS and his sugar was found to be in the thousands.  She expresses concern that he cannot be care for adequately at home any longer and wishes to pursue skilled nursing facility.    Regarding Diabetes Mellitus:    Recurring episodes of DKA  Surgical Procedure and Date: NA  Diabetes diagnosis year: >20 years  Home Diabetes Medications:  During recent SNF was on Aspart 8 TID and glargine 8 units daily with sliding scale  How often checking glucose at home? >4 x day   Diabetes Complications include:  Hyperglycemia, Hypoglycemia , and Diabetic peripheral neuropathy   Complicating diabetes co morbidities: History of CVA      Interval HPI:   Overnight events:  No acute events reported overnight  Eatin%  Nausea: No  Hypoglycemia and intervention: No  Fever: No  TPN and/or TF: No  If yes, type of TF/TPN and rate: NA    PMH, PSH, FH, SH updated and reviewed     ROS:  Review of Systems   Constitutional:  Positive for activity change and fatigue.   Endocrine: Positive for polydipsia and polyuria.     Current Medications and/or Treatments Impacting Glycemic Control  Immunotherapy:    Immunosuppressants       None          Steroids:   Hormones (From admission, onward)                Start     Stop Route Frequency Ordered    22 1047  melatonin tablet 6 mg  (Medication Panel)         -- Oral Nightly PRN 22 0948          Pressors:    Autonomic Drugs (From admission, onward)                None          Hyperglycemia/Diabetes Medications:   Antihyperglycemics (From admission, onward)                Start     Stop Route Frequency Ordered    22 1130  insulin aspart U-100 pen 8 Units         -- SubQ 3 times daily with meals 22 0851    22 0900  insulin detemir U-100 pen 10 Units         -- SubQ 2 times daily 22 0850    22 0819  insulin aspart U-100 pen 1-10 Units         -- SubQ Every 6 hours PRN 22 0720             PHYSICAL EXAMINATION:  Vitals:    22 1101   BP:    Pulse:    Resp:    Temp: 97.7 °F (36.5 °C)     Body mass index is 25.1 kg/m².    Physical Exam  Constitutional:       Appearance: Normal appearance.   Cardiovascular:      Rate and Rhythm: Normal rate and regular rhythm.      Pulses: Normal pulses.   Neurological:      General: No focal deficit present.      Mental Status: He is alert.   Psychiatric:         Mood and Affect: Mood normal.         Behavior: Behavior normal.         Labs Reviewed and Include   Recent Labs   Lab 22  0305   *    CALCIUM 8.0*      K 4.0   CO2 27      BUN 16   CREATININE 1.0     Lab Results   Component Value Date    WBC 9.87 2022    HGB 11.0 (L) 2022    HCT 33.9 (L) 2022    MCV 92 2022     2022     No results for input(s): TSH, FREET4 in the last 168 hours.  Lab Results   Component Value Date    HGBA1C 9.7 (H) 2022       Nutritional status:   Body mass index is 25.1 kg/m².  Lab Results   Component Value Date    ALBUMIN 3.0 (L) 2022    ALBUMIN 2.5 (L) 04/10/2022    ALBUMIN 2.4 (L) 2022     No results found for: PREALBUMIN    Estimated Creatinine Clearance: 64.8 mL/min (based on SCr of 1 mg/dL).    Accu-Checks  Recent Labs     22  0044 22  0444 22  0801 22  1212 22  1640 22  1759 22  2215 22  0043 22  0615 22  0941   POCTGLUCOSE 97 167* 280* 442* 322* 310* 217* 233* 249* 156*        ASSESSMENT and PLAN    Ketosis-prone diabetes mellitus  Key History and Diagnostic Findings  - A1c of 9.7 on 2022 from 11.5 on 2022     - Home Regimen: Levemir 8 units daily, aspart 8 units TIDWM  - Weight based dosin kg x 0.5 = 41 TDD x 0.5 = 20 basal / 20 prandial  - 1700/TDD = 41 (estimated insulin sensitivity factor)  - 450/TDD = 11 (estimated starting carb ratio for prandial dosing)  - Glucose Goals: 160-200mg/dL  - 24 hour glucose trend: Variable control over 24 hours in the 200s and 300s     Plan  - Start Levemir 10 units twice daily  - Continue aspart 8 units TIDWM with moderate correction  - After speaking with daughter, she wishes to pursue skilled nursing facility for discharge as she states he cannot be adequately taking care of at home        - Hypoglycemia protocol in place  - If blood glucose greater than 300, please ask patient not to eat food or drink anything other than water until correctional insulin has brought it back below 250  - Please ensure patient receives their p.r.n. insulin  aspart if they eat a snack with more than 30 g of carbohydrate     BRENDAN (acute kidney injury)  - Resolving, decreased renal function can cause decreased renal clearance and result in insulin stacking increasing risk of hypoglycemia     Coronary artery disease  - Strive to optimize glucose control    Plan discussed with patient and family    Yeison Hinton DO  [facility], [address]  [address] [place], [place], [address]    Office: [phone]  Fax: [phone]    Disclaimer: This note has been generated using voice-recognition software. There may be typographical errors that have been missed during proof-reading.    The above history labs imaging impression and plan were discussed with attending physician who is in agreement and also took part in this patient's care.  I personally reviewed all of the patients available medications, labs, imaging, vitals, allergies, medical history.

## 2022-04-14 NOTE — NURSING
"Patient received from ICU. VS per flowsheets. Blood glucose checked per order, scheduled insulin administered per MAR. Patient alert and oriented x4, c/o soreness on his bottom. Patient turned, small opening on his sacrum and nonblanchable redness. Mepilex and barrier cream applied. Patient refusing wedge to offload pressure, states "it makes it worse". SCDs ordered for patient, when this RN was placing sleeves on, patient refused, stated "I can't stand those things, I'm going to take that off as soon as you put it on". Patient educated on use and importance of SCDs, verbalized understanding, still refusing nursing to put on. Patient on clear liquid diet, tolerating at this time. Water and apple juice at bedside, patient drank both with no complaints. Denies any nausea at this time. Patient educated on use of call bell, instructed to call for assistance to reposition, verbalized understanding. Fall precautions in place, call bell within reach, no needs at this time.   "

## 2022-04-14 NOTE — HPI
76 year old  male with T2DM, HTN, CAD, STEMI, HLD, paroxysmal Afib, CVA, seizures who presents for recurrent DKA.  He presented to [facility] ED on 4/11 with elevated blood glucose.  He was discharged from [facility] on 4/10 after being admitted for DKA on 4/5. Per family he was not feeling well after discharge to home and vomited several times on 4/11. Finger stick at home read High with unreadable blood glucose.  At this time daughter gave him 14 units of insulin and sublingual zofran. Other than malaise and nausea patient was not exhibiting any other signs/symptoms at home.    - In ER BG found to be 1015 with pCO2 6 and anion gap 35.  Hyperkalemic with K 7.0 and some EKG changes when compared to previous EKG.  Given calcium gluconate, and started on insulin gtt as well as subQ long acting insulin     - Endocrinology consulted for management of type 2 diabetes after resolution of DKA    - Spoke with daughter who states patient was discharged on 04/10/2022 with high glucose in the 400s which worsened after getting home and eating dinner; appropriate mealtime and correction insulin were given at that time. However, the next day patient's condition was worsening and they called EMS and his sugar was found to be in the thousands. She expresses concern that he cannot be care for adequately at home any longer and wishes to pursue skilled nursing facility.    Regarding Diabetes Mellitus:    Recurring episodes of DKA  Surgical Procedure and Date: NA  Diabetes diagnosis year: >20 years  Home Diabetes Medications:  During recent SNF was on Aspart 8 TID and glargine 8 units daily with sliding scale  How often checking glucose at home? >4 x day   Diabetes Complications include: Hyperglycemia, Hypoglycemia , and Diabetic peripheral neuropathy   Complicating diabetes co morbidities: History of CVA

## 2022-04-14 NOTE — SUBJECTIVE & OBJECTIVE
Interval HPI:   Overnight events:  No acute events reported overnight  Eatin%  Nausea: No  Hypoglycemia and intervention: No  Fever: No  TPN and/or TF: No  If yes, type of TF/TPN and rate: NA    PMH, PSH, FH, SH updated and reviewed     ROS:  Review of Systems   Constitutional:  Positive for activity change and fatigue.   Endocrine: Positive for polydipsia and polyuria.     Current Medications and/or Treatments Impacting Glycemic Control  Immunotherapy:    Immunosuppressants       None          Steroids:   Hormones (From admission, onward)                Start     Stop Route Frequency Ordered    22 1047  melatonin tablet 6 mg  (Medication Panel)         -- Oral Nightly PRN 22 0948          Pressors:    Autonomic Drugs (From admission, onward)                None          Hyperglycemia/Diabetes Medications:   Antihyperglycemics (From admission, onward)                Start     Stop Route Frequency Ordered    22 1130  insulin aspart U-100 pen 8 Units         -- SubQ 3 times daily with meals 22 0851    22 0900  insulin detemir U-100 pen 10 Units         -- SubQ 2 times daily 22 0850    22 0819  insulin aspart U-100 pen 1-10 Units         -- SubQ Every 6 hours PRN 22 0720             PHYSICAL EXAMINATION:  Vitals:    22 1101   BP:    Pulse:    Resp:    Temp: 97.7 °F (36.5 °C)     Body mass index is 25.1 kg/m².    Physical Exam  Constitutional:       Appearance: Normal appearance.   Cardiovascular:      Rate and Rhythm: Normal rate and regular rhythm.      Pulses: Normal pulses.   Neurological:      General: No focal deficit present.      Mental Status: He is alert.   Psychiatric:         Mood and Affect: Mood normal.         Behavior: Behavior normal.

## 2022-04-14 NOTE — NURSING TRANSFER
Nursing Transfer Note      4/14/2022     Reason patient is being transferred: stepdown    Transfer To: [facility] [id number]    Transfer via bed    Transfer with cardiac monitoring    Transported by SOY Bailey and PCT    Medicines sent: Yes    Chart send with patient: Yes    Notified: daughter, son    Patient reassessed at: 4/14/2022 @ 1600    Upon arrival to floor: cardiac monitor applied, patient oriented to room, call bell in reach and bed in lowest position

## 2022-04-14 NOTE — ASSESSMENT & PLAN NOTE
- Resolving, decreased renal function can cause decreased renal clearance and result in insulin stacking increasing risk of hypoglycemia

## 2022-04-14 NOTE — PLAN OF CARE
Tx team is recommending home with home health/PT.  Pt schedule to step down to hospital medicine service.         04/14/22 1405   Post-Acute Status   Post-Acute Authorization Home Health   Home Health Status Pending medical clearance/testing   Coverage [employer] H[employer]   Discharge Delays None known at this time   Discharge Plan   Discharge Plan A Home Health   Discharge Plan B Home with family     Estee Fang LMSW  [facility]  X [phone]

## 2022-04-15 LAB
ALBUMIN SERPL BCP-MCNC: 2.3 G/DL (ref 3.5–5.2)
ALP SERPL-CCNC: 138 U/L (ref 55–135)
ALT SERPL W/O P-5'-P-CCNC: 17 U/L (ref 10–44)
ANION GAP SERPL CALC-SCNC: 10 MMOL/L (ref 8–16)
AST SERPL-CCNC: 25 U/L (ref 10–40)
BASOPHILS # BLD AUTO: 0.02 K/UL (ref 0–0.2)
BASOPHILS NFR BLD: 0.3 % (ref 0–1.9)
BILIRUB SERPL-MCNC: 0.6 MG/DL (ref 0.1–1)
BUN SERPL-MCNC: 10 MG/DL (ref 8–23)
CALCIUM SERPL-MCNC: 8.5 MG/DL (ref 8.7–10.5)
CHLORIDE SERPL-SCNC: 100 MMOL/L (ref 95–110)
CO2 SERPL-SCNC: 24 MMOL/L (ref 23–29)
CREAT SERPL-MCNC: 1 MG/DL (ref 0.5–1.4)
DIFFERENTIAL METHOD: ABNORMAL
EOSINOPHIL # BLD AUTO: 0.1 K/UL (ref 0–0.5)
EOSINOPHIL NFR BLD: 2 % (ref 0–8)
ERYTHROCYTE [DISTWIDTH] IN BLOOD BY AUTOMATED COUNT: 16.3 % (ref 11.5–14.5)
EST. GFR  (AFRICAN AMERICAN): >60 ML/MIN/1.73 M^2
EST. GFR  (NON AFRICAN AMERICAN): >60 ML/MIN/1.73 M^2
GLUCOSE SERPL-MCNC: 114 MG/DL (ref 70–110)
HCT VFR BLD AUTO: 35.5 % (ref 40–54)
HGB BLD-MCNC: 11.8 G/DL (ref 14–18)
IMM GRANULOCYTES # BLD AUTO: 0.02 K/UL (ref 0–0.04)
IMM GRANULOCYTES NFR BLD AUTO: 0.3 % (ref 0–0.5)
LYMPHOCYTES # BLD AUTO: 1 K/UL (ref 1–4.8)
LYMPHOCYTES NFR BLD: 15.5 % (ref 18–48)
MAGNESIUM SERPL-MCNC: 1.6 MG/DL (ref 1.6–2.6)
MCH RBC QN AUTO: 29 PG (ref 27–31)
MCHC RBC AUTO-ENTMCNC: 33.2 G/DL (ref 32–36)
MCV RBC AUTO: 87 FL (ref 82–98)
MONOCYTES # BLD AUTO: 0.4 K/UL (ref 0.3–1)
MONOCYTES NFR BLD: 6.9 % (ref 4–15)
NEUTROPHILS # BLD AUTO: 4.8 K/UL (ref 1.8–7.7)
NEUTROPHILS NFR BLD: 75 % (ref 38–73)
NRBC BLD-RTO: 0 /100 WBC
PHOSPHATE SERPL-MCNC: 3.3 MG/DL (ref 2.7–4.5)
PLATELET # BLD AUTO: 193 K/UL (ref 150–450)
PMV BLD AUTO: 9.6 FL (ref 9.2–12.9)
POCT GLUCOSE: 120 MG/DL (ref 70–110)
POCT GLUCOSE: 123 MG/DL (ref 70–110)
POCT GLUCOSE: 179 MG/DL (ref 70–110)
POCT GLUCOSE: 73 MG/DL (ref 70–110)
POCT GLUCOSE: 92 MG/DL (ref 70–110)
POTASSIUM SERPL-SCNC: 4.3 MMOL/L (ref 3.5–5.1)
PROT SERPL-MCNC: 5.8 G/DL (ref 6–8.4)
RBC # BLD AUTO: 4.07 M/UL (ref 4.6–6.2)
SODIUM SERPL-SCNC: 134 MMOL/L (ref 136–145)
WBC # BLD AUTO: 6.38 K/UL (ref 3.9–12.7)

## 2022-04-15 PROCEDURE — 20600001 HC STEP DOWN PRIVATE ROOM

## 2022-04-15 PROCEDURE — 99233 SBSQ HOSP IP/OBS HIGH 50: CPT | Mod: ,,, | Performed by: INTERNAL MEDICINE

## 2022-04-15 PROCEDURE — 25000003 PHARM REV CODE 250: Performed by: INTERNAL MEDICINE

## 2022-04-15 PROCEDURE — 97535 SELF CARE MNGMENT TRAINING: CPT

## 2022-04-15 PROCEDURE — 97116 GAIT TRAINING THERAPY: CPT

## 2022-04-15 PROCEDURE — 36415 COLL VENOUS BLD VENIPUNCTURE: CPT | Performed by: INTERNAL MEDICINE

## 2022-04-15 PROCEDURE — 84100 ASSAY OF PHOSPHORUS: CPT | Performed by: INTERNAL MEDICINE

## 2022-04-15 PROCEDURE — 94761 N-INVAS EAR/PLS OXIMETRY MLT: CPT

## 2022-04-15 PROCEDURE — 97165 OT EVAL LOW COMPLEX 30 MIN: CPT

## 2022-04-15 PROCEDURE — 80053 COMPREHEN METABOLIC PANEL: CPT | Performed by: INTERNAL MEDICINE

## 2022-04-15 PROCEDURE — 99232 PR SUBSEQUENT HOSPITAL CARE,LEVL II: ICD-10-PCS | Mod: GC,,, | Performed by: INTERNAL MEDICINE

## 2022-04-15 PROCEDURE — 63600175 PHARM REV CODE 636 W HCPCS: Performed by: INTERNAL MEDICINE

## 2022-04-15 PROCEDURE — 99232 SBSQ HOSP IP/OBS MODERATE 35: CPT | Mod: GC,,, | Performed by: INTERNAL MEDICINE

## 2022-04-15 PROCEDURE — 85025 COMPLETE CBC W/AUTO DIFF WBC: CPT | Performed by: INTERNAL MEDICINE

## 2022-04-15 PROCEDURE — 97530 THERAPEUTIC ACTIVITIES: CPT

## 2022-04-15 PROCEDURE — 83735 ASSAY OF MAGNESIUM: CPT | Performed by: INTERNAL MEDICINE

## 2022-04-15 PROCEDURE — 99233 PR SUBSEQUENT HOSPITAL CARE,LEVL III: ICD-10-PCS | Mod: ,,, | Performed by: INTERNAL MEDICINE

## 2022-04-15 PROCEDURE — 97161 PT EVAL LOW COMPLEX 20 MIN: CPT

## 2022-04-15 RX ORDER — INSULIN ASPART 100 [IU]/ML
7 INJECTION, SOLUTION INTRAVENOUS; SUBCUTANEOUS
Status: DISCONTINUED | OUTPATIENT
Start: 2022-04-15 | End: 2022-04-15

## 2022-04-15 RX ORDER — INSULIN ASPART 100 [IU]/ML
8 INJECTION, SOLUTION INTRAVENOUS; SUBCUTANEOUS
Status: DISCONTINUED | OUTPATIENT
Start: 2022-04-15 | End: 2022-04-17

## 2022-04-15 RX ADMIN — AMIODARONE HYDROCHLORIDE 200 MG: 200 TABLET ORAL at 08:04

## 2022-04-15 RX ADMIN — SENNOSIDES AND DOCUSATE SODIUM 1 TABLET: 50; 8.6 TABLET ORAL at 08:04

## 2022-04-15 RX ADMIN — LACOSAMIDE 100 MG: 100 TABLET, FILM COATED ORAL at 08:04

## 2022-04-15 RX ADMIN — ATORVASTATIN CALCIUM 40 MG: 20 TABLET, FILM COATED ORAL at 08:04

## 2022-04-15 RX ADMIN — INSULIN DETEMIR 10 UNITS: 100 INJECTION, SOLUTION SUBCUTANEOUS at 08:04

## 2022-04-15 RX ADMIN — POLYETHYLENE GLYCOL 3350 17 G: 17 POWDER, FOR SOLUTION ORAL at 08:04

## 2022-04-15 RX ADMIN — PANTOPRAZOLE SODIUM 40 MG: 40 TABLET, DELAYED RELEASE ORAL at 08:04

## 2022-04-15 RX ADMIN — METOPROLOL SUCCINATE 50 MG: 50 TABLET, EXTENDED RELEASE ORAL at 08:04

## 2022-04-15 RX ADMIN — INSULIN ASPART 8 UNITS: 100 INJECTION, SOLUTION INTRAVENOUS; SUBCUTANEOUS at 04:04

## 2022-04-15 RX ADMIN — APIXABAN 5 MG: 5 TABLET, FILM COATED ORAL at 08:04

## 2022-04-15 RX ADMIN — CLOPIDOGREL 75 MG: 75 TABLET, FILM COATED ORAL at 08:04

## 2022-04-15 RX ADMIN — ONDANSETRON 8 MG: 2 INJECTION INTRAMUSCULAR; INTRAVENOUS at 08:04

## 2022-04-15 NOTE — PROGRESS NOTES
[facility] - Telemetry [facility] Medicine  Progress Note    Patient Name: Kamar Muñoz  MRN: [id number]  Patient Class: IP- Inpatient   Admission Date: 4/11/2022  Length of Stay: 4 days  Attending Physician: Siva Hassan MD  Primary Care Provider: Basim Guerrero MD        Subjective:     Principal Problem:Diabetic ketoacidosis without coma associated with type 2 diabetes mellitus        HPI:  Mr. Kamar Muñoz is a 76 y.o. male with a past medical history of HTN, Type 2 DM, CAD, STEMI, HLD, paroxysmal Afib, CVA, seizures and recurrent DKA.  He presented to [facility] ED on 4/11 with elevated blood glucose.  He was discharged from [facility] on 4/10 after being admitted for DKA on 4/5.  At time of exam patient is alert but altered and unable to provide any history.  Spoke with patient's daughter who states she is a primary caregiver at home and obtained history as well as review of chart.  Per family he was not feeling well after discharge to home and vomited several times on 4/11. Unknown characteristics of emesis. Finger stick at home read High with unreadable blood glucose.  At this time daughter gave him 14 units of insulin and sublingual zofran. Other than malaise and nausea patient was not exhibiting any other signs/symptoms at home.  In ER BG found to be 1015 with pCO2 6 and anion gap 35.  Hyperkalemic with K 7.0 and some EKG changes when compared to previous EKG.  Given calcium gluconate, and started on insulin gtt as well as subQ long acting insulin.      Critical Care Medicine consulted for DKA and admitted to MICU.        Overview/Hospital Course:  Admitted to MICU on 4/11 for DKA with BG >1000, pCO2 6, AG 35, and hyperkalemia.  Given Calcium gluconate and started on insulin gtt in ED.  Hyperkalemia not improved on repeat labs and bolused with IV insulin.  Infectious workup sent and broad spectrum abx started.  4/12 potassium improving with insulin. 4/13 Gap closed, insulin gtt turned off and  switched to subq insulin. Endocrine consulted for insulin mgmt. He was stepped down to  4/14.    Since step down stable. Endocrine following and titrating insulin. Advanced diet. Advanced diet. PT OT ordered for safe dispo plan.      Interval History: Stepped down to  yesterday night. No issues this morning. Endocrine following, titrating insulin. Advancing diet. No complaints at this time. PT OT pending.    Review of Systems   Constitutional:  Positive for appetite change and weakness. Negative for fever.   HENT:  Negative for rhinorrhea and sore throat.    Respiratory:  Negative for cough and shortness of breath.    Cardiovascular:  Negative for chest pain and palpitations.   Gastrointestinal:  Negative for pain, N/V diarrhea.   Genitourinary:  Negative for difficulty urinating and hematuria.   Musculoskeletal:  Negative for back pain and neck pain.   Skin:  Negative for rash and wound.   Neurological:  Negative for tremors and headaches.   All other systems reviewed and are negative.    Objective:     Vital Signs (Most Recent):  Temp: 96.4 °F (35.8 °C) (04/15/22 1122)  Pulse: 60 (04/15/22 1122)  Resp: 18 (04/15/22 1122)  BP: (!) 149/76 (04/15/22 1122)  SpO2: (!) 94 % (04/15/22 1122)   Vital Signs (24h Range):  Temp:  [96.4 °F (35.8 °C)-98.6 °F (37 °C)] 96.4 °F (35.8 °C)  Pulse:  [59-76] 60  Resp:  [18-29] 18  SpO2:  [76 %-97 %] 94 %  BP: (144-156)/(66-78) 149/76     Weight: 81.6 kg (180 lb)  Body mass index is 25.1 kg/m².    Intake/Output Summary (Last 24 hours) at 4/15/2022 1414  Last data filed at 4/15/2022 0600  Gross per 24 hour   Intake --   Output 1200 ml   Net -1200 ml        Physical Exam  Vitals and nursing note reviewed.   Constitutional:       General: He is not in acute distress.     Appearance: Normal appearance. He is normal weight. He is not ill-appearing, toxic-appearing or diaphoretic.   HENT:      Head: Normocephalic and atraumatic.      Right Ear: External ear normal.      Left Ear:  External ear normal.      Nose: Nose normal.   Cardiovascular:      Rate and Rhythm: Normal rate and regular rhythm.      Pulses: Normal pulses.      Heart sounds: Normal heart sounds. No murmur heard.    No friction rub. No gallop.   Pulmonary:      Effort: Pulmonary effort is normal. No respiratory distress.      Breath sounds: Normal breath sounds. No wheezing, rhonchi or rales.   Abdominal:      General: Abdomen is flat. Bowel sounds are normal. There is no distension.      Palpations: Abdomen is soft.      Tenderness: There is no abdominal tenderness. There is no guarding or rebound.   Musculoskeletal:         General: No swelling. Normal range of motion.   Skin:     General: Skin is warm and dry.      Coloration: Skin is not jaundiced.   Neurological:      General: No focal deficit present.      Mental Status: He is alert and oriented to person, place, and time. Mental status is at baseline.   Psychiatric:         Mood and Affect: Mood normal.         Behavior: Behavior normal.         Thought Content: Thought content normal.         Judgment: Judgment normal.     Significant Labs: All pertinent labs within the past 24 hours have been reviewed.    Significant Imaging: I have reviewed all pertinent imaging results/findings within the past 24 hours.      Assessment/Plan:      * Diabetic ketoacidosis without coma associated with type 2 diabetes mellitus  - Resolved  - Unclear inciting etiology missed insulin doses vs sepsis.    - Recent admission 4/5 for DKA and discharged from [facility] 4/10.  Per family malaise since discharge with vomiting starting 4/10.  BG at home greater unreadable.  Given 14 units subQ insulin at that time.  In ER BG found to be 1015 with pCO2 6 and anion gap 35 and hyperkalemia.  Started on insulin gtt in ED and given 20 units subQ long acting insulin.    - Started on DKA protocol in the ICU, now on subq insulin  - Endocrine consulted, adjusting insulin  - Advanced diet   - ACHS, hypoglycemia  protocol , SSI    Lactic acidosis  - LA 10.3 on hospital admission.  Concern for infectious process given WBC on admission 17.05, on 4/10 prior to discharge WBC 6.82. CXR with bilateral interstitial pattern, no focal areas of consolidation.    - Procalcitonin 1.18  - Blood cultures NGTD  - Antibiotics discontinued in the ICU  - Low suspicion for any infectious etiology , likely from DKA       Goals of care, counseling/discussion  - DNR as per ICU    Ketosis-prone diabetes mellitus  - see DKA      Focal seizures  - History of seizures on lacosamide.    - Continue home lacosamide    Coronary artery disease  - Stable  - Continue home regimen of aspirin, atorvastatin, clopidogrel, losartan, and metoprolol      Paroxysmal atrial fibrillation  - History of afib.    - Continue home regimen of amiodarone, apixaban, and metoprolol        BRENDAN (acute kidney injury)  - Resolved  - Likely secondary to dehydration in the setting of DKA.  Baseline Cr per chart review ~1.0.  Cr on admission 2.3.     Essential hypertension  - resume home meds as able        VTE Risk Mitigation (From admission, onward)         Ordered     apixaban tablet 5 mg  2 times daily         04/12/22 0142     IP VTE HIGH RISK PATIENT  Once         04/11/22 2342     Place sequential compression device  Until discontinued         04/11/22 2138                Discharge Planning   ALLIE: 4/18/2022     Code Status: DNR   Is the patient medically ready for discharge?: No    Reason for patient still in hospital (select all that apply): Patient trending condition  Discharge Plan A: Home Health   Discharge Delays: None known at this time        Siva Hassan MD  Department of Hospital Medicine   [facility] - Telemetry Stepdown

## 2022-04-15 NOTE — ASSESSMENT & PLAN NOTE
- Resolved  - Unclear inciting etiology missed insulin doses vs sepsis.    - Recent admission 4/5 for DKA and discharged from [facility] 4/10.  Per family malaise since discharge with vomiting starting 4/10.  BG at home greater unreadable.  Given 14 units subQ insulin at that time.  In ER BG found to be 1015 with pCO2 6 and anion gap 35 and hyperkalemia.  Started on insulin gtt in ED and given 20 units subQ long acting insulin.    - Started on DKA protocol in the ICU, now on subq insulin  - Endocrine consulted, adjusting insulin  - Advanced diet   - ACHS, hypoglycemia protocol , SSI

## 2022-04-15 NOTE — ASSESSMENT & PLAN NOTE
- Resolved  - Likely secondary to dehydration in the setting of DKA.  Baseline Cr per chart review ~1.0.  Cr on admission 2.3.

## 2022-04-15 NOTE — ASSESSMENT & PLAN NOTE
- Stable  - Continue home regimen of aspirin, atorvastatin, clopidogrel, losartan, and metoprolol

## 2022-04-15 NOTE — NURSING
Patient ambulating to bathroom with walker, steady gait. Patient refuses to sit OOB in recliner, states it hurts his sacrum. RN offered to place pillow under chair, waffle cushion, still refusing. Patient refuses to offload pressure with wedge in bed, educated that he is at risk for compromising his skin integrity, verbalized understanding, still declining to offload.

## 2022-04-15 NOTE — PLAN OF CARE
Problem: Physical Therapy  Goal: Physical Therapy Goal  Description: Goals to met by 4/29/2022     1. Supine to sit with Modified [place]  2. Sit to supine with Modified [place]  3. Sit to stand transfer with Modified [place]  4. Bed to chair transfer with Modified [place] using Rolling Walker  5. Gait  x 150 feet with Stand-by Assistance using Rolling Walker   6. Ascend/Descend 6 inch curb step with Contact Guard Assistance using LRAD.  7. Lower extremity exercise program x15 reps per Instruction, with supervision in order to facilitate improvement in functional independence    PT Eval: 4/15/2022

## 2022-04-15 NOTE — PT/OT/SLP EVAL
Physical Therapy Co-Evaluation and Co-Treatment  Co-evaluation with OT for maximal pt participation, safety, and activity tolerance    Patient Name:  Kamar Muñoz   MRN:  [id number]  Admit Date: 4/11/2022  Admitting Diagnosis:  Diabetic ketoacidosis without coma associated with type 2 diabetes mellitus   Length of Stay: 4 days  Recent Surgery: * No surgery found *      Recommendations:     Discharge Recommendations:  nursing facility, skilled   Discharge Equipment Recommendations: none   Barriers to discharge: Decreased Caregiver support and Evolving Clinical Presentation    Assessment:     Kamar Muñoz is a 76 y.o. male admitted with a medical diagnosis of Diabetic ketoacidosis without coma associated with type 2 diabetes mellitus.  He presents with the following impairments/functional limitations: weakness, impaired endurance, impaired functional mobilty, impaired cognition, decreased safety awareness, decreased lower extremity function, impaired balance, impaired sensation, gait instability.     Pt presents supine in bed, agreeable to therapy, condom cath building pressure and twisted so concerned we will lose the cath. Pt carefully assisted to sit EOB with min A and condom cath carefully drained and untwisted. Pt able to sit upright independently but occasionally slowly leans farther and farther posteriorly requiring verbal and tactile cueing to correct, twice needing min A to sit back fully upright. Pt then stands from EOB to RW with CGA, ambulates into hallway and back to bed totaling ~80ft. Pt required CGA throughout gait with one minor LOB requiring min A to correct. Pt ambulates with very narrow NICOLE and trends to left side of walker despite frequent verbal cueing to correct. Helped patient call his wife on hospital phone as she is also in the hospital, and talked with PCT about finding a bedside chair for patient. Treatment tolerated well.     Rehab Prognosis: Good; patient would benefit from acute  "skilled PT services to address these deficits and reach maximum level of function.      Plan:     During this hospitalization, patient to be seen 4 x/week to address the identified rehab impairments via gait training, therapeutic activities, therapeutic exercises, neuromuscular re-education and progress towards the established goals.    · Plan of Care Expires:  05/15/22    Subjective     RN Claire notified prior to session. No family present upon PT entrance into room.    Chief Complaint: condom cath bothering him  Patient/Family Comments/goals: "I didn't think I got enough therapy at the SNF last time  Pain/Comfort:  · Pain Rating 1: 0/10    Social History:  Residence: lives with their spouse 1-story house with 1 CORINNA and 0 HR. Pt's bathroom has a WIS w/ grab bar and shower chair.  Support available: neighbor comes by daily for suppprt  Equipment Used: walker, rolling  Equipment Owned (not using): None  Prior level of function: assist required for household chores and IADLs  Hand Dominance: right.   Work: Retired.   Drive: no   Managing Medicines/Managing Home: yes.   Hobbies: taking care of his cats    Patient reports they will have assistance from neighbor upon discharge.    Objective:     Additional staff present: OT Shira    Patient found supine with: Condom Catheter     General Precautions: Standard, Cardiac fall, seizure   Orthopedic Precautions:N/A   Braces: N/A   Body mass index is 25.1 kg/m².  Oxygen Device: Room Air      Vitals: BP (!) 149/76 (Patient Position: Lying)   Pulse 60   Temp 96.4 °F (35.8 °C) (Oral)   Resp 18   Ht 5' 11" (1.803 m)   Wt 81.6 kg (180 lb)   SpO2 (!) 94%   BMI 25.10 kg/m²     Exams:  · Cognition:   · Oriented X 4, Alert and Cooperative  · Command following: Follows two-step verbal commands  · Fluency: clear/fluent  · Hearing: Intact  · Vision:  Intact  · Skin Integrity: Visible skin intact    Physical Exam:   · Edema - None noted   · ROM - HERO LEs WFL except knee extension " limited HERO ~10deg, can passively extend to -5  · Strength - Hips grossly 4+/5, L knee 4/5 R knee 4+/5, ankles 4+/5   · Sensation - Intact to light touch  · Coordination - No deficits noted    Outcome Measures:    AM-PAC 6 CLICK MOBILITY  Turning over in bed (including adjusting bedclothes, sheets and blankets)?: 3  Sitting down on and standing up from a chair with arms (e.g., wheelchair, bedside commode, etc.): 3  Moving from lying on back to sitting on the side of the bed?: 3  Moving to and from a bed to a chair (including a wheelchair)?: 3  Need to walk in hospital room?: 3  Climbing 3-5 steps with a railing?: 2  Basic Mobility Total Score: 17     Functional Mobility:    Bed Mobility:   · Scooting to HOB via supine bridge: modified independence  · Supine to Sit: minimum assistance; from R side of bed  · Scooting anteriorly to EOB to have both feet planted on floor: moderate assistance  · Sit to Supine: minimum assistance; to L side of bed    Sitting Balance at Edge of Bed:   Static Sitting Balance: Fair : able to sit unsupported without balance loss and without UE support   Poor+ : able to maintain balance with minimal assistance from individual or chair   Dynamic Sitting Balance: Poor: able to sit unsupported with moderate assistance and reach ipsilaterally or anteriorly. Unable to cross midline.   Assistance Level Required: Stand-by Assistance, Contact Guard Assistance and Minimal Assistance   Time: 12 min   Postural deviations noted: slouched posture and posterior lean   Encouraged: upright sitting, forward lean for independent balance    Transfers:   · Sit <> Stand Transfer: contact guard assistance with rolling walker   · Stand <> Sit Transfer: contact guard assistance with rolling walker   · [id number] from EOB    Standing Balance:   Static Standing Balance: Fair- : requires minimal assistance or UE support in order to stand without LOB   Dynamic Standing Balance: Poor+ : able to stand with minimal  assistance and reach ipsilaterally. Unable to weight shift.   Assistance Level Required: Contact Guard Assistance   Patient used: rolling walker    Time: 12 min   Postural deviations noted: no deviations noted      Gait:   · Patient ambulated: 80ft   · Patient required: contact guard  · Patient used:  rolling walker   · Gait Pattern observed: swing through  · Gait Deviation(s): occasional unsteady gait, narrow base of support and stays to left side of walker  · Impairments due to: impaired balance and impaired postural control  · Gait belt utilized  · Mask donned for out of room ambulation  · Comments: one minor LOB requiring min A to recover    Education:   Time provided for education, counseling and discussion of health disposition in regards to patient's current status   All questions answered within PT scope of practice and to patient's satisfaction   PT role in POC to address current functional deficits   Pt educated on proper body mechanics, safety techniques, and energy conservation with PT facilitation and cueing throughout session    Patient left supine with all lines intact, call button in reach and PCT present.    GOALS:   Multidisciplinary Problems     Physical Therapy Goals        Problem: Physical Therapy    Goal Priority Disciplines Outcome Goal Variances Interventions   Physical Therapy Goal     PT, PT/OT Ongoing, Progressing     Description: Goals to met by 4/29/2022     1. Supine to sit with Modified [place]  2. Sit to supine with Modified [place]  3. Sit to stand transfer with Modified [place]  4. Bed to chair transfer with Modified [place] using Rolling Walker  5. Gait  x 150 feet with Stand-by Assistance using Rolling Walker   6. Ascend/Descend 6 inch curb step with Contact Guard Assistance using LRAD.  7. 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    Hyperlipidemia     Hypertension     Kidney stone     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 8/2/2012    STEMI involving right coronary artery 1/9/2022    Type II or unspecified type diabetes mellitus with neurological manifestations, uncontrolled(250.62) 3/8/2013    Urinary tract infection        Past Surgical History:   Procedure Laterality Date    BACK SURGERY      CATARACT EXTRACTION W/  INTRAOCULAR LENS IMPLANT Right     Per Dr Romero note 11/2018    COLONOSCOPY N/A 1/28/2019    Procedure: COLONOSCOPY Suprep;  Surgeon: Anh Johnson MD;  Location: [facility] ENDO;  Service: Endoscopy;  Laterality: N/A;    EYE SURGERY      HERNIA REPAIR      LEFT HEART CATHETERIZATION Left 1/9/2022    Procedure: CATHETERIZATION, HEART, LEFT;  Surgeon: Will Hurst III, MD;  Location: [facility] CATH LAB/EP;  Service: Cardiology;  Laterality: Left;    renal stones      SHOULDER OPEN ROTATOR CUFF REPAIR         Time Tracking:     PT Received On: 04/15/22  PT Start Time: 1047     PT Stop Time: 1124  PT Total Time (min): 37 min     Billable Minutes: Evaluation 10 min, Gait Training 14 min and Therapeutic Activity 13 min    Anoop Tesfaye, PT, DPT  4/15/2022

## 2022-04-15 NOTE — ASSESSMENT & PLAN NOTE
- Resolved, decreased renal function can cause decreased renal clearance and result in insulin stacking increasing risk of hypoglycemia

## 2022-04-15 NOTE — PT/OT/SLP EVAL
Occupational Therapy   Co-Evaluation & Treatment with Physical Therapy  Co-treatment performed due to patient's multiple deficits requiring two skilled therapists to safely facilitate functional tasks in addition to accommodating for patient's activity tolerance.    Name: Kamar Muñoz  MRN: [id number]  Admitting Diagnosis:  Diabetic ketoacidosis without coma associated with type 2 diabetes mellitus    Length of Stay: 4 days    Recommendations:     Discharge Recommendations: nursing facility, skilled  Discharge Equipment Recommendations:   (TBD at next level of care)  Barriers to discharge:  Decreased caregiver support (increased assistance, high fall risk)    Plan:     Patient to be seen 4 x/week to address the above listed problems via self-care/home management, therapeutic activities, therapeutic exercises  · Plan of Care Expires: 05/15/22  · Plan of Care Reviewed with: patient      Assessment:     Kamar Muñoz is a 76 y.o. male with a medical diagnosis of Diabetic ketoacidosis without coma associated with type 2 diabetes mellitus.  He presents with the following performance deficits affecting function: weakness, impaired endurance, impaired self care skills, impaired functional mobilty, gait instability, impaired balance, decreased coordination, decreased upper extremity function, decreased lower extremity function.      Pt mainly limited by gait instability on this date. Pt requiring minimum assistance for bed mobility, contact guard assistance - minimum assistance for functional mobility with rolling walker, and moderate assistance - total assistance for ADLs (UE dressing, LE dressing). Pt's strengths include strong desire to regain independence and functional UE strength. Pt with good motivation and fair tolerance for therapy. Pt would benefit from SNF OT upon D/C to return to [facility].    Rehab Prognosis: Good; patient would benefit from acute skilled OT services to address these deficits and reach  "maximum level of function.         Subjective     Communicated with: Claire RN prior to session.  Patient found HOB elevated with Condom Catheter, telemetry and no family present upon OT entry to room.    Chief Complaint: Altered Mental Status    Patient/Family Comments/goals: "I'm trying to call my wife. She is also here."    Pain/Comfort:  · Pain Rating 1: 0/10  · Pain Rating Post-Intervention 1: 0/10    Patients cultural, spiritual, Mu-ism conflicts given the current situation: no    Occupational Profile:  Living Environment: Pt lives with his wife in a H with 1 CORINNA. Pt's bathroom has a WIS with a shower chair and grab bars in place.  Prior Level of Function: Patient reports being modified independent with mobility & with ADLs, however he requires assistance for LE dressing from his wife.   Patient uses DME as follows: walker, rolling, shower chair, grab bar, bedside commode.   DME owned (not currently used): none.  Roles/Responsibilities:   Hand Dominance: right   Work: no.   Drive: no.   Managing Medicines/Managing Home: Some; has a neighbor who comes over Mon-Fri to assist with ADLs (e.g. pet care).   Hobbies/Roles: Caregiver for 6 cats.  Equipment Used at Home:  walker, rolling, shower chair, grab bar, bedside commode    Patient reports they will have limited assistance from neighbor upon discharge.      Objective:     Patient found with: Condom Catheter, telemetry   General Precautions: Standard, fall, seizure   Orthopedic Precautions:N/A   Braces: N/A   Oxygen Device: Room Air  Vitals: BP (!) 149/76 (Patient Position: Lying)   Pulse 60   Temp 96.4 °F (35.8 °C) (Oral)   Resp 18   Ht 5' 11" (1.803 m)   Wt 81.6 kg (180 lb)   SpO2 (!) 94%   BMI 25.10 kg/m²     Cognitive and Psychosocial Function:   · AxOx4 -- Person, Place, Time and Situation   · Follows Commands/attention:follows multi-step commands  · Communication:  clear/fluent  · Memory: No Deficits noted  · Safety awareness/insight to " disability: intact   · Mood/Affect/Coping skills/emotional control: Appropriate to situation and Pleasant    Hearing: No deficits noted    Vision:  No deficits noted    Physical Exam:  Postural examination/scapula alignment:    -       Rounded shoulders  -       Forward head     Left UE (Residual Derrek) Right UE   UE Edema absent absent   UE ROM AROM WFL AROM WFL   UE Strength adequate ROM, adequate strength adequate ROM, adequate strength    Strength Moderate composite grasp grasp WFL   Sensation LUE INTACT:WFL RUE INTACT: WFL   Fine Motor Coordination:  LUE IMPAIRED: hand, finger to nose RUE INTACT: hand, finger to nose   Gross Motor Coordination: LUE INTACT: WFL, limited by fatigue and impaired functional endurance RUE INTACT:WFL, limited by fatigue and impaired functional endurance     Occupational Performance:  Bed Mobility:    · Patient completed Supine to Sit with minimum assistance on right side of bed  · Scooting anteriorly to EOB to have both feet planted on floor: moderate assistance  · Patient completed Sit to Supine with minimum assistance on right side of bed    Functional Mobility/Transfers:   Static Sitting EOB: Stand by assistance   Dynamic Sitting EOB: Contact guard assistance with posterior lean noted   Patient completed Sit <> Stand Transfer with contact guard assistance  with  rolling walker    Static Standing Balance: Stand by assistance - contact guard assistance   Functional Mobility: Pt ambulated short household distance in hallway with contact guard assistance - minimum assistance with rolling walker   o 1 LOB; minimum assistance to correct  o Narrow base of support noted  o No SOB or c/o dizziness    Activities of Daily Living:  · Upper Body Dressing: moderate assistance to don gown simulating jacket (increased assistance 2/2 lines)  · Lower Body Dressing: total assistance to don B  socks      AMPAC 6 Click ADL:  AMPAC Total Score: 16    Treatment & Education:  - Educated on  role of OT, POC, and goals for this hospital stay  - Emphasized importance of OOB ax and level of staff assistance required for transfers and functional mobility (contact guard assistance with rolling walker)  - Encouraged pt to alert OT of personal self-care goals and/or comfort-related concerns during future OT sessions  - Pt denies any further questions, concerns, or requests at this time  - Whiteboard updated        Patient left HOB elevated with all lines intact and call button in reach    GOALS:   Multidisciplinary Problems     Occupational Therapy Goals        Problem: Occupational Therapy    Goal Priority Disciplines Outcome Interventions   Occupational Therapy Goal     OT, PT/OT Ongoing, Progressing    Description: Goals to be met by: 4/29/2022     Patient will increase functional independence with ADLs by performing:    UE Dressing with Set-up Assistance.  LE Dressing with Minimal Assistance using AD PRN.  Grooming while standing at sink with Stand-by Assistance.  Toileting from toilet with Supervision for hygiene and clothing management.   Step transfer with Supervision using AD PRN.  Toilet transfer to toilet with Stand-by Assistance using AD PRN.                       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    Hyperlipidemia     Hypertension     Kidney stone     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 8/2/2012    STEMI involving right coronary artery 1/9/2022    Type II or unspecified type diabetes mellitus with neurological manifestations, uncontrolled(250.62) 3/8/2013    Urinary tract infection        Past Surgical History:   Procedure Laterality Date    BACK SURGERY      CATARACT EXTRACTION W/  INTRAOCULAR LENS IMPLANT Right     Per Dr Romero note 11/2018    COLONOSCOPY N/A 1/28/2019    Procedure: COLONOSCOPY  Lisa;  Surgeon: Anh Johnson MD;  Location: [facility] ENDO;  Service: Endoscopy;  Laterality: N/A;    EYE SURGERY      HERNIA REPAIR      LEFT HEART CATHETERIZATION Left 1/9/2022    Procedure: CATHETERIZATION, HEART, LEFT;  Surgeon: Will Hurst III, MD;  Location: [facility] CATH LAB/EP;  Service: Cardiology;  Laterality: Left;    renal stones      SHOULDER OPEN ROTATOR CUFF REPAIR           Time Tracking:     OT Date of Treatment: 04/15/22  OT Start Time: 1048  OT Stop Time: 1124  OT Total Time (min): 36 min  Additional staff present: PT      Billable Minutes:Evaluation 10  Self Care/Home Management 13  Therapeutic Activity 13      4/15/2022

## 2022-04-15 NOTE — PLAN OF CARE
Problem: Occupational Therapy  Goal: Occupational Therapy Goal  Description: Goals to be met by: 4/29/2022     Patient will increase functional independence with ADLs by performing:    UE Dressing with Set-up Assistance.  LE Dressing with Minimal Assistance using AD PRN.  Grooming while standing at sink with Stand-by Assistance.  Toileting from toilet with Supervision for hygiene and clothing management.   Step transfer with Supervision using AD PRN.  Toilet transfer to toilet with Stand-by Assistance using AD PRN.    Outcome: Ongoing, Progressing     Evaluation complete; goals and POC established.

## 2022-04-15 NOTE — PLAN OF CARE
Patient received, alert and awake, vital signs per flowsheet. PCOT glucose checks Q6. Patient with poor appetite. External cath in place. Patient repositions self independently in bed. Mepilex in place, sacrum with non-blanchable redness. Patient refuses wedge to offload due to discomfort. POC reviewed, questions encouraged and answered. Fall precautions in place, call bell within reach, no needs at this time    Problem: Adult Inpatient Plan of Care  Goal: Plan of Care Review  Outcome: Ongoing, Not Progressing  Goal: Patient-Specific Goal (Individualized)  Outcome: Ongoing, Not Progressing  Goal: Absence of Hospital-Acquired Illness or Injury  Outcome: Ongoing, Not Progressing  Goal: Optimal Comfort and Wellbeing  Outcome: Ongoing, Not Progressing  Goal: Readiness for Transition of Care  Outcome: Ongoing, Not Progressing     Problem: Diabetes Comorbidity  Goal: Blood Glucose Level Within Targeted Range  Outcome: Ongoing, Not Progressing     Problem: Fluid and Electrolyte Imbalance (Acute Kidney Injury/Impairment)  Goal: Fluid and Electrolyte Balance  Outcome: Ongoing, Not Progressing     Problem: Oral Intake Inadequate (Acute Kidney Injury/Impairment)  Goal: Optimal Nutrition Intake  Outcome: Ongoing, Not Progressing     Problem: Renal Function Impairment (Acute Kidney Injury/Impairment)  Goal: Effective Renal Function  Outcome: Ongoing, Not Progressing     Problem: Impaired Wound Healing  Goal: Optimal Wound Healing  Outcome: Ongoing, Not Progressing     Problem: Skin Injury Risk Increased  Goal: Skin Health and Integrity  Outcome: Ongoing, Not Progressing     Problem: Diabetic Ketoacidosis  Goal: Fluid and Electrolyte Balance with Absence of Ketosis  Outcome: Ongoing, Not Progressing

## 2022-04-15 NOTE — NURSING
2000  Received report for pt from AM Nurse. Pt resting in bed. Tele box in place. AAOx4; RA. VSS. No c/o pain. Education provided regarding safety and fall prevention. Safety check performed. Call bell within reach. Will continue to monitor.    0000  Pt resting in bed. Tele box in place. AAOx4; RA. Medication administered per MAR. Safety check performed. Call bell within reach. Will continue to monitor.

## 2022-04-15 NOTE — HOSPITAL COURSE
Admitted to MICU on 4/11 for DKA with BG >1000, pCO2 6, AG 35, and hyperkalemia.  Given Calcium gluconate and started on insulin gtt in ED.  Hyperkalemia not improved on repeat labs and bolused with IV insulin.  Infectious workup sent and broad spectrum abx started.  4/12 potassium improving with insulin. 4/13 Gap closed, insulin gtt turned off and switched to subq insulin. Endocrine consulted for insulin mgmt. He was stepped down to  4/14.    Since step down stable. Advanced diet. Endocrine following and titrating insulin. Poor po intake making it difficult to adjust insulin. CT chest with cavitary lesion, pulmonary and ID consulted. SNF once medically stable for dc.

## 2022-04-15 NOTE — ASSESSMENT & PLAN NOTE
Key History and Diagnostic Findings  - A1c of 9.7 on 2022 from 11.5 on 2022    - Home Regimen: Levemir 8 units daily, aspart 8 units TIDWM  - Weight based dosin kg x 0.5 = 41 TDD x 0.5 = 20 basal / 20 prandial  - 1700/TDD = 41 (estimated insulin sensitivity factor)  - 450/TDD = 11 (estimated starting carb ratio for prandial dosing)  - Glucose Goals: 160-200mg/dL  - 24 hour glucose trend:  Exceedingly good control with glucose values in the 70s and 80s, no hypoglycemia, did trend down overnight so will decrease basal to avoid potential hypoglycemia    Plan  - Decrease Levemir from 10 down to 8 units twice daily starting this evening  - Decrease aspart from 8 down to 7 units TIDWM with moderate correction  - basal/prandial (43%/57%)  - Daughter wishes to pursue skilled nursing facility for discharge as she states he cannot be adequately taken care of at home      - Hypoglycemia protocol in place  - If blood glucose greater than 300, please ask patient not to eat food or drink anything other than water until correctional insulin has brought it back below 250  - Please ensure patient receives their p.r.n. insulin aspart if they eat a snack with more than 30 g of carbohydrate

## 2022-04-15 NOTE — HPI
. Kamar Muñoz is a 76 y.o. male with a past medical history of HTN, Type 2 DM, CAD, STEMI, HLD, paroxysmal Afib, CVA, seizures and recurrent DKA.  He presented to [facility] ED on 4/11 with elevated blood glucose.  He was discharged from [facility] on 4/10 after being admitted for DKA on 4/5.  At time of exam patient is alert but altered and unable to provide any history.  Spoke with patient's daughter who states she is a primary caregiver at home and obtained history as well as review of chart.  Per family he was not feeling well after discharge to home and vomited several times on 4/11. Unknown characteristics of emesis. Finger stick at home read High with unreadable blood glucose.  At this time daughter gave him 14 units of insulin and sublingual zofran. Other than malaise and nausea patient was not exhibiting any other signs/symptoms at home.  In ER BG found to be 1015 with pCO2 6 and anion gap 35.  Hyperkalemic with K 7.0 and some EKG changes when compared to previous EKG.  Given calcium gluconate, and started on insulin gtt as well as subQ long acting insulin.      Critical Care Medicine consulted for DKA and admitted to MICU.

## 2022-04-15 NOTE — PROGRESS NOTES
[facility] - Telemetry Stepdown  Endocrinology  Progress Note    Admit Date: 4/11/2022     76 year old  male with T2DM, HTN, CAD, STEMI, HLD, paroxysmal Afib, CVA, seizures who presents for recurrent DKA.  He presented to [facility] ED on 4/11 with elevated blood glucose.  He was discharged from [facility] on 4/10 after being admitted for DKA on 4/5. Per family he was not feeling well after discharge to home and vomited several times on 4/11. Finger stick at home read High with unreadable blood glucose.  At this time daughter gave him 14 units of insulin and sublingual zofran. Other than malaise and nausea patient was not exhibiting any other signs/symptoms at home.    - In ER BG found to be 1015 with pCO2 6 and anion gap 35.  Hyperkalemic with K 7.0 and some EKG changes when compared to previous EKG.  Given calcium gluconate, and started on insulin gtt as well as subQ long acting insulin     - Endocrinology consulted for management of type 2 diabetes after resolution of DKA    - spoke with daughter on the telephone who states patient was discharged on 04/10/2022 with high glucose in the 400s which worsened after getting home and eating dinner; appropriate mealtime and correction insulin were given at that time. The next day patient's condition was worsening and they called EMS and his sugar was found to be in the thousands.  She expresses concern that he cannot be care for adequately at home any longer and wishes to pursue skilled nursing facility.    Regarding Diabetes Mellitus:    Recurring episodes of DKA  Surgical Procedure and Date: NA  Diabetes diagnosis year: >20 years  Home Diabetes Medications:  During recent SNF was on Aspart 8 TID and glargine 8 units daily with sliding scale  How often checking glucose at home? >4 x day   Diabetes Complications include: Hyperglycemia, Hypoglycemia , and Diabetic peripheral neuropathy   Complicating diabetes co morbidities: History of CVA      Interval HPI:   Overnight events:   No acute events reported overnight  Eatin%  Nausea: No  Hypoglycemia and intervention: No  Fever: No  TPN and/or TF: No  If yes, type of TF/TPN and rate: NA    PMH, PSH, FH, SH updated and reviewed     ROS:  Review of Systems   Constitutional:  Positive for activity change and fatigue.   Endocrine: Positive for polydipsia and polyuria.     Current Medications and/or Treatments Impacting Glycemic Control  Immunotherapy:    Immunosuppressants       None          Steroids:   Hormones (From admission, onward)                Start     Stop Route Frequency Ordered    22 1047  melatonin tablet 6 mg  (Medication Panel)         -- Oral Nightly PRN 22 0948          Pressors:    Autonomic Drugs (From admission, onward)                None          Hyperglycemia/Diabetes Medications:   Antihyperglycemics (From admission, onward)                Start     Stop Route Frequency Ordered    22 1130  insulin aspart U-100 pen 8 Units         -- SubQ 3 times daily with meals 22 0851    22 0900  insulin detemir U-100 pen 10 Units         -- SubQ 2 times daily 22 0850    22 0819  insulin aspart U-100 pen 1-10 Units         -- SubQ Every 6 hours PRN 22 0720             PHYSICAL EXAMINATION:  Vitals:    22 1101   BP:    Pulse:    Resp:    Temp: 97.7 °F (36.5 °C)     Body mass index is 25.1 kg/m².    Physical Exam  Constitutional:       Appearance: Normal appearance.   Cardiovascular:      Rate and Rhythm: Normal rate and regular rhythm.      Pulses: Normal pulses.   Neurological:      General: No focal deficit present.      Mental Status: He is alert.   Psychiatric:         Mood and Affect: Mood normal.         Behavior: Behavior normal.         ASSESSMENT and PLAN    Ketosis-prone diabetes mellitus  Key History and Diagnostic Findings  - A1c of 9.7 on 2022 from 11.5 on 2022    - Home Regimen: Levemir 8 units daily, aspart 8 units TIDWM  - Weight based dosin kg x  0.5 = 41 TDD x 0.5 = 20 basal / 20 prandial  - 1700/TDD = 41 (estimated insulin sensitivity factor)  - 450/TDD = 11 (estimated starting carb ratio for prandial dosing)  - Glucose Goals: 160-200mg/dL  - 24 hour glucose trend:  Exceedingly good control with glucose values in the 70s and 80s, no hypoglycemia, did trend down overnight but patient was on bariatric clear diet    Plan  - Starting diabetic diet today  - Continue Levemir 10 units twice daily  - Continue aspart 8 units TIDWM with moderate correction  - basal/prandial (45%55%)  - Daughter wishes to pursue skilled nursing facility for discharge as she states he cannot be adequately taken care of at home      - Hypoglycemia protocol in place  - If blood glucose greater than 300, please ask patient not to eat food or drink anything other than water until correctional insulin has brought it back below 250  - Please ensure patient receives their p.r.n. insulin aspart if they eat a snack with more than 30 g of carbohydrate    BRENDAN (acute kidney injury)  - Resolved, decreased renal function can cause decreased renal clearance and result in insulin stacking increasing risk of hypoglycemia    Coronary artery disease  - Strive to optimize glucose control        Yeison Hinton DO  [facility], [address]th Floor  [address] [place], [place], [address]    Office: [phone]  Fax: [phone]    Disclaimer: This note has been generated using voice-recognition software. There may be typographical errors that have been missed during proof-reading.    The above history labs imaging impression and plan were discussed with attending physician who is in agreement and also took part in this patient's care.  I personally reviewed all of the patients available medications, labs, imaging, vitals, allergies, medical history.

## 2022-04-15 NOTE — ASSESSMENT & PLAN NOTE
Key History and Diagnostic Findings  - A1c of 9.7 on 2022 from 11.5 on 2022    - Home Regimen: Levemir 8 units daily, aspart 8 units TIDWM  - Weight based dosin kg x 0.5 = 41 TDD x 0.5 = 20 basal / 20 prandial  - 1700/TDD = 41 (estimated insulin sensitivity factor)  - 450/TDD = 11 (estimated starting carb ratio for prandial dosing)  - Glucose Goals: 160-200mg/dL  - 24 hour glucose trend:  Good control overall but did have hypoglycemic event of 56 at roughly midnight    Plan  - Decrease levemir 10 down to 8 units twice daily starting this morning  - Continue aspart 8 units TIDWM with moderate correction  - basal/prandial (40%/60%)  - Daughter wishes to pursue skilled nursing facility for discharge as she states he cannot be adequately taken care of at home    - Hypoglycemia protocol in place  - If blood glucose greater than 300, please ask patient not to eat food or drink anything other than water until correctional insulin has brought it back below 250  - Please ensure patient receives their p.r.n. insulin aspart if they eat a snack with more than 30 g of carbohydrate

## 2022-04-15 NOTE — ASSESSMENT & PLAN NOTE
- LA 10.3 on hospital admission.  Concern for infectious process given WBC on admission 17.05, on 4/10 prior to discharge WBC 6.82. CXR with bilateral interstitial pattern, no focal areas of consolidation.    - Procalcitonin 1.18  - Blood cultures NGTD  - Antibiotics discontinued in the ICU  - Low suspicion for any infectious etiology , likely from DKA

## 2022-04-16 LAB
ALBUMIN SERPL BCP-MCNC: 2.4 G/DL (ref 3.5–5.2)
ALP SERPL-CCNC: 138 U/L (ref 55–135)
ALT SERPL W/O P-5'-P-CCNC: 16 U/L (ref 10–44)
ANION GAP SERPL CALC-SCNC: 11 MMOL/L (ref 8–16)
AST SERPL-CCNC: 23 U/L (ref 10–40)
BASOPHILS # BLD AUTO: 0.02 K/UL (ref 0–0.2)
BASOPHILS NFR BLD: 0.3 % (ref 0–1.9)
BILIRUB SERPL-MCNC: 0.6 MG/DL (ref 0.1–1)
BUN SERPL-MCNC: 10 MG/DL (ref 8–23)
CALCIUM SERPL-MCNC: 8.5 MG/DL (ref 8.7–10.5)
CHLORIDE SERPL-SCNC: 100 MMOL/L (ref 95–110)
CO2 SERPL-SCNC: 26 MMOL/L (ref 23–29)
CREAT SERPL-MCNC: 0.8 MG/DL (ref 0.5–1.4)
DIFFERENTIAL METHOD: ABNORMAL
EOSINOPHIL # BLD AUTO: 0.2 K/UL (ref 0–0.5)
EOSINOPHIL NFR BLD: 3.6 % (ref 0–8)
ERYTHROCYTE [DISTWIDTH] IN BLOOD BY AUTOMATED COUNT: 16 % (ref 11.5–14.5)
EST. GFR  (AFRICAN AMERICAN): >60 ML/MIN/1.73 M^2
EST. GFR  (NON AFRICAN AMERICAN): >60 ML/MIN/1.73 M^2
GLUCOSE SERPL-MCNC: 51 MG/DL (ref 70–110)
HCT VFR BLD AUTO: 35.7 % (ref 40–54)
HGB BLD-MCNC: 11.5 G/DL (ref 14–18)
IMM GRANULOCYTES # BLD AUTO: 0.02 K/UL (ref 0–0.04)
IMM GRANULOCYTES NFR BLD AUTO: 0.3 % (ref 0–0.5)
LYMPHOCYTES # BLD AUTO: 1.7 K/UL (ref 1–4.8)
LYMPHOCYTES NFR BLD: 26 % (ref 18–48)
MAGNESIUM SERPL-MCNC: 1.7 MG/DL (ref 1.6–2.6)
MCH RBC QN AUTO: 28.7 PG (ref 27–31)
MCHC RBC AUTO-ENTMCNC: 32.2 G/DL (ref 32–36)
MCV RBC AUTO: 89 FL (ref 82–98)
MONOCYTES # BLD AUTO: 0.6 K/UL (ref 0.3–1)
MONOCYTES NFR BLD: 9.4 % (ref 4–15)
NEUTROPHILS # BLD AUTO: 4 K/UL (ref 1.8–7.7)
NEUTROPHILS NFR BLD: 60.4 % (ref 38–73)
NRBC BLD-RTO: 0 /100 WBC
PHOSPHATE SERPL-MCNC: 3.2 MG/DL (ref 2.7–4.5)
PLATELET # BLD AUTO: 209 K/UL (ref 150–450)
PMV BLD AUTO: 10.1 FL (ref 9.2–12.9)
POCT GLUCOSE: 119 MG/DL (ref 70–110)
POCT GLUCOSE: 191 MG/DL (ref 70–110)
POCT GLUCOSE: 216 MG/DL (ref 70–110)
POCT GLUCOSE: 241 MG/DL (ref 70–110)
POCT GLUCOSE: 296 MG/DL (ref 70–110)
POCT GLUCOSE: 483 MG/DL (ref 70–110)
POCT GLUCOSE: 56 MG/DL (ref 70–110)
POCT GLUCOSE: >500 MG/DL (ref 70–110)
POTASSIUM SERPL-SCNC: 3.9 MMOL/L (ref 3.5–5.1)
PROT SERPL-MCNC: 5.9 G/DL (ref 6–8.4)
RBC # BLD AUTO: 4.01 M/UL (ref 4.6–6.2)
SODIUM SERPL-SCNC: 137 MMOL/L (ref 136–145)
WBC # BLD AUTO: 6.61 K/UL (ref 3.9–12.7)

## 2022-04-16 PROCEDURE — 80053 COMPREHEN METABOLIC PANEL: CPT | Performed by: INTERNAL MEDICINE

## 2022-04-16 PROCEDURE — 83735 ASSAY OF MAGNESIUM: CPT | Performed by: INTERNAL MEDICINE

## 2022-04-16 PROCEDURE — 85025 COMPLETE CBC W/AUTO DIFF WBC: CPT | Performed by: INTERNAL MEDICINE

## 2022-04-16 PROCEDURE — 25000003 PHARM REV CODE 250: Performed by: INTERNAL MEDICINE

## 2022-04-16 PROCEDURE — 99232 SBSQ HOSP IP/OBS MODERATE 35: CPT | Mod: ,,, | Performed by: INTERNAL MEDICINE

## 2022-04-16 PROCEDURE — 99232 SBSQ HOSP IP/OBS MODERATE 35: CPT | Mod: GC,,, | Performed by: INTERNAL MEDICINE

## 2022-04-16 PROCEDURE — 63600175 PHARM REV CODE 636 W HCPCS: Performed by: INTERNAL MEDICINE

## 2022-04-16 PROCEDURE — 20600001 HC STEP DOWN PRIVATE ROOM

## 2022-04-16 PROCEDURE — 84100 ASSAY OF PHOSPHORUS: CPT | Performed by: INTERNAL MEDICINE

## 2022-04-16 PROCEDURE — 36415 COLL VENOUS BLD VENIPUNCTURE: CPT | Performed by: INTERNAL MEDICINE

## 2022-04-16 PROCEDURE — 99232 PR SUBSEQUENT HOSPITAL CARE,LEVL II: ICD-10-PCS | Mod: ,,, | Performed by: INTERNAL MEDICINE

## 2022-04-16 PROCEDURE — 99232 PR SUBSEQUENT HOSPITAL CARE,LEVL II: ICD-10-PCS | Mod: GC,,, | Performed by: INTERNAL MEDICINE

## 2022-04-16 RX ADMIN — APIXABAN 5 MG: 5 TABLET, FILM COATED ORAL at 08:04

## 2022-04-16 RX ADMIN — ATORVASTATIN CALCIUM 40 MG: 20 TABLET, FILM COATED ORAL at 08:04

## 2022-04-16 RX ADMIN — INSULIN ASPART 4 UNITS: 100 INJECTION, SOLUTION INTRAVENOUS; SUBCUTANEOUS at 03:04

## 2022-04-16 RX ADMIN — METOPROLOL SUCCINATE 50 MG: 50 TABLET, EXTENDED RELEASE ORAL at 08:04

## 2022-04-16 RX ADMIN — SENNOSIDES AND DOCUSATE SODIUM 1 TABLET: 50; 8.6 TABLET ORAL at 08:04

## 2022-04-16 RX ADMIN — POLYETHYLENE GLYCOL 3350 17 G: 17 POWDER, FOR SOLUTION ORAL at 08:04

## 2022-04-16 RX ADMIN — INSULIN ASPART 8 UNITS: 100 INJECTION, SOLUTION INTRAVENOUS; SUBCUTANEOUS at 07:04

## 2022-04-16 RX ADMIN — INSULIN ASPART 8 UNITS: 100 INJECTION, SOLUTION INTRAVENOUS; SUBCUTANEOUS at 03:04

## 2022-04-16 RX ADMIN — PANTOPRAZOLE SODIUM 40 MG: 40 TABLET, DELAYED RELEASE ORAL at 08:04

## 2022-04-16 RX ADMIN — INSULIN ASPART 8 UNITS: 100 INJECTION, SOLUTION INTRAVENOUS; SUBCUTANEOUS at 04:04

## 2022-04-16 RX ADMIN — CLOPIDOGREL 75 MG: 75 TABLET, FILM COATED ORAL at 08:04

## 2022-04-16 RX ADMIN — LACOSAMIDE 100 MG: 100 TABLET, FILM COATED ORAL at 08:04

## 2022-04-16 RX ADMIN — ONDANSETRON 8 MG: 2 INJECTION INTRAMUSCULAR; INTRAVENOUS at 07:04

## 2022-04-16 RX ADMIN — AMIODARONE HYDROCHLORIDE 200 MG: 200 TABLET ORAL at 08:04

## 2022-04-16 NOTE — CONSULTS
[facility]  Telemedicine Consult Note    Patient Name: Kamar Muñoz  MRN: [id number]  Admission Date: 4/11/2022  Hospital Length of Stay: 5 days  Attending Physician: Siva Hassan MD   Primary Care Provider: Basim Guerrero MD     Thank you for your consult to [facility]. We have reviewed the patient chart. This patient does not meet criteria for [facility] service at this time due to [facility] service capacity limitations. Will hand back to In-house service..          Tiffany Awad MD  Department of Hospital Medicine   [facility]

## 2022-04-16 NOTE — PROGRESS NOTES
[facility] - Telemetry [facility] Medicine  Progress Note    Patient Name: Kamar Muñoz  MRN: [id number]  Patient Class: IP- Inpatient   Admission Date: 4/11/2022  Length of Stay: 5 days  Attending Physician: Siva Hassan MD  Primary Care Provider: Basim Guerrero MD        Subjective:     Principal Problem:Diabetic ketoacidosis without coma associated with type 2 diabetes mellitus        HPI:  Mr. Kamar Muñoz is a 76 y.o. male with a past medical history of HTN, Type 2 DM, CAD, STEMI, HLD, paroxysmal Afib, CVA, seizures and recurrent DKA.  He presented to [facility] [place] on 4/11 with elevated blood glucose.  He was [place] from [facility] [place] 4/10 after being admitted for DKA on 4/5.  At time of exam patient is alert but altered and unable to provide any history.  Spoke with patient's daughter who states she is a primary caregiver at home and obtained history as well as review of chart.  Per family he was not feeling well after discharge to home and vomited several times on 4/11. Unknown characteristics of emesis. Finger stick at home read High with unreadable blood glucose.  At this time daughter gave him 14 units of insulin and sublingual zofran. Other than malaise and nausea patient was not exhibiting any other signs/symptoms at home.  In ER BG found to be 1015 with pCO2 6 and anion gap 35.  Hyperkalemic with K 7.0 and some EKG changes when compared to previous EKG.  Given calcium gluconate, and started on insulin gtt as well as subQ long acting insulin.      Critical Care Medicine consulted for DKA and admitted to MICU.        Overview/Hospital Course:  Admitted to MICU on 4/11 for DKA with BG >1000, pCO2 6, AG 35, and hyperkalemia.  Given Calcium gluconate and started on insulin gtt in ED.  Hyperkalemia not improved on repeat labs and bolused with IV insulin.  Infectious workup sent and broad spectrum abx started.  4/12 potassium improving with insulin. 4/13 Gap closed, insulin gtt turned off and  switched to subq insulin. Endocrine consulted for insulin mgmt. He was stepped down to  4/14.    Since step down stable. Endocrine following and titrating insulin. Advanced diet. PT OT ordered for safe dispo plan rec SNF.      Interval History: No issues this morning. Glucose labile. Very poor po intake. Continues to have weakness. Aox4. Endocrine following, titrating insulin. No complaints at this time.     Review of Systems   Constitutional:  Positive for appetite change and weakness. Negative for fever.   HENT:  Negative for rhinorrhea and sore throat.    Respiratory:  Negative for cough and shortness of breath.    Cardiovascular:  Negative for chest pain and palpitations.   Gastrointestinal:  Negative for pain, N/V diarrhea.   Genitourinary:  Negative for difficulty urinating and hematuria.   Musculoskeletal:  Negative for back pain and neck pain.   Skin:  Negative for rash and wound.   Neurological:  Negative for tremors and headaches.   All other systems reviewed and are negative.    Objective:     Vital Signs (Most Recent):  Temp: 97.7 °F (36.5 °C) (04/16/22 1122)  Pulse: 66 (04/16/22 1126)  Resp: 18 (04/16/22 1122)  BP: 124/60 (04/16/22 1122)  SpO2: (!) 94 % (04/16/22 1122)   Vital Signs (24h Range):  Temp:  [96.7 °F (35.9 °C)-98.2 °F (36.8 °C)] 97.7 °F (36.5 °C)  Pulse:  [62-70] 66  Resp:  [16-20] 18  SpO2:  [94 %-96 %] 94 %  BP: (124-172)/(60-78) 124/60     Weight: 81.6 kg (180 lb)  Body mass index is 25.1 kg/m².    Intake/Output Summary (Last 24 hours) at 4/16/2022 1229  Last data filed at 4/16/2022 0500  Gross per 24 hour   Intake --   Output 500 ml   Net -500 ml        Physical Exam  Vitals and nursing note reviewed.   Constitutional:       General: He is not in acute distress.     Appearance: Normal appearance. He is normal weight. He is not ill-appearing, toxic-appearing or diaphoretic.   HENT:      Head: Normocephalic and atraumatic.      Right Ear: External ear normal.      Left Ear: External ear  normal.      Nose: Nose normal.   Cardiovascular:      Rate and Rhythm: Normal rate and regular rhythm.      Pulses: Normal pulses.      Heart sounds: Normal heart sounds. No murmur heard.    No friction rub. No gallop.   Pulmonary:      Effort: Pulmonary effort is normal. No respiratory distress.      Breath sounds: Normal breath sounds. No wheezing, rhonchi or rales.   Abdominal:      General: Abdomen is flat. Bowel sounds are normal. There is no distension.      Palpations: Abdomen is soft.      Tenderness: There is no abdominal tenderness. There is no guarding or rebound.   Musculoskeletal:         General: No swelling. Normal range of motion.   Skin:     General: Skin is warm and dry.      Coloration: Skin is not jaundiced.   Neurological:      General: No focal deficit present.      Mental Status: He is alert and oriented to person, place, and time. Mental status is at baseline.   Psychiatric:         Mood and Affect: Mood normal.         Behavior: Behavior normal.         Thought Content: Thought content normal.         Judgment: Judgment normal.     Significant Labs: All pertinent labs within the past 24 hours have been reviewed.    Significant Imaging: I have reviewed all pertinent imaging results/findings within the past 24 hours.      Assessment/Plan:      * Diabetic ketoacidosis without coma associated with type 2 diabetes mellitus  - Resolved  - Unclear inciting etiology missed insulin doses vs sepsis.    - Recent admission 4/5 for DKA and discharged from [facility] 4/10.  Per family malaise since discharge with vomiting starting 4/10.  BG at home greater unreadable.  Given 14 units subQ insulin at that time.  In ER BG found to be 1015 with pCO2 6 and anion gap 35 and hyperkalemia.  Started on insulin gtt in ED and given 20 units subQ long acting insulin.    - Started on DKA protocol in the ICU, now on subq insulin  - Endocrine consulted, adjusting insulin  - Advanced diet   - ACHS, hypoglycemia protocol ,  SSI    Lactic acidosis  - LA 10.3 on hospital admission.  Concern for infectious process given WBC on admission 17.05, on 4/10 prior to discharge WBC 6.82. CXR with bilateral interstitial pattern, no focal areas of consolidation.    - Procalcitonin 1.18  - Blood cultures NGTD  - Antibiotics discontinued in the ICU  - Low suspicion for any infectious etiology , likely from DKA       Goals of care, counseling/discussion  - DNR as per ICU    Ketosis-prone diabetes mellitus  - see DKA      Focal seizures  - History of seizures on lacosamide.    - Continue home lacosamide    Coronary artery disease  - Stable  - Continue home regimen of aspirin, atorvastatin, clopidogrel, losartan, and metoprolol      Paroxysmal atrial fibrillation  - History of afib.    - Continue home regimen of amiodarone, apixaban, and metoprolol        BRENDAN (acute kidney injury)  - Resolved  - Likely secondary to dehydration in the setting of DKA.  Baseline Cr per chart review ~1.0.  Cr on admission 2.3.     Essential hypertension  - resume home meds as able        VTE Risk Mitigation (From admission, onward)         Ordered     apixaban tablet 5 mg  2 times daily         04/12/22 0142     IP VTE HIGH RISK PATIENT  Once         04/11/22 2342     Place sequential compression device  Until discontinued         04/11/22 2138                Discharge Planning   ALILE: 4/18/2022     Code Status: DNR   Is the patient medically ready for discharge?: No    Reason for patient still in hospital (select all that apply): Patient trending condition  Discharge Plan A: Home Health   Discharge Delays: None known at this time        Siva Hassan MD  Department of Hospital Medicine   [facility] - Telemetry Stepdown

## 2022-04-16 NOTE — SUBJECTIVE & OBJECTIVE
Interval History: No issues this morning. Glucose labile. Very poor po intake. Continues to have weakness. Aox4. Endocrine following, titrating insulin. No complaints at this time.     Review of Systems   Constitutional:  Positive for appetite change and weakness. Negative for fever.   HENT:  Negative for rhinorrhea and sore throat.    Respiratory:  Negative for cough and shortness of breath.    Cardiovascular:  Negative for chest pain and palpitations.   Gastrointestinal:  Negative for pain, N/V diarrhea.   Genitourinary:  Negative for difficulty urinating and hematuria.   Musculoskeletal:  Negative for back pain and neck pain.   Skin:  Negative for rash and wound.   Neurological:  Negative for tremors and headaches.   All other systems reviewed and are negative.    Objective:     Vital Signs (Most Recent):  Temp: 97.7 °F (36.5 °C) (04/16/22 1122)  Pulse: 66 (04/16/22 1126)  Resp: 18 (04/16/22 1122)  BP: 124/60 (04/16/22 1122)  SpO2: (!) 94 % (04/16/22 1122)   Vital Signs (24h Range):  Temp:  [96.7 °F (35.9 °C)-98.2 °F (36.8 °C)] 97.7 °F (36.5 °C)  Pulse:  [62-70] 66  Resp:  [16-20] 18  SpO2:  [94 %-96 %] 94 %  BP: (124-172)/(60-78) 124/60     Weight: 81.6 kg (180 lb)  Body mass index is 25.1 kg/m².    Intake/Output Summary (Last 24 hours) at 4/16/2022 1229  Last data filed at 4/16/2022 0500  Gross per 24 hour   Intake --   Output 500 ml   Net -500 ml        Physical Exam  Vitals and nursing note reviewed.   Constitutional:       General: He is not in acute distress.     Appearance: Normal appearance. He is normal weight. He is not ill-appearing, toxic-appearing or diaphoretic.   HENT:      Head: Normocephalic and atraumatic.      Right Ear: External ear normal.      Left Ear: External ear normal.      Nose: Nose normal.   Cardiovascular:      Rate and Rhythm: Normal rate and regular rhythm.      Pulses: Normal pulses.      Heart sounds: Normal heart sounds. No murmur heard.    No friction rub. No gallop.    Pulmonary:      Effort: Pulmonary effort is normal. No respiratory distress.      Breath sounds: Normal breath sounds. No wheezing, rhonchi or rales.   Abdominal:      General: Abdomen is flat. Bowel sounds are normal. There is no distension.      Palpations: Abdomen is soft.      Tenderness: There is no abdominal tenderness. There is no guarding or rebound.   Musculoskeletal:         General: No swelling. Normal range of motion.   Skin:     General: Skin is warm and dry.      Coloration: Skin is not jaundiced.   Neurological:      General: No focal deficit present.      Mental Status: He is alert and oriented to person, place, and time. Mental status is at baseline.   Psychiatric:         Mood and Affect: Mood normal.         Behavior: Behavior normal.         Thought Content: Thought content normal.         Judgment: Judgment normal.     Significant Labs: All pertinent labs within the past 24 hours have been reviewed.    Significant Imaging: I have reviewed all pertinent imaging results/findings within the past 24 hours.

## 2022-04-16 NOTE — ASSESSMENT & PLAN NOTE
- Resolved  - Unclear inciting etiology missed insulin doses vs sepsis.    - Recent admission 4/5 for DKA and [place] [facility] [place]  Per family malaise since discharge with vomiting starting 4/10.  BG at home greater unreadable.  Given 14 units subQ insulin at that time.  In ER BG found to be 1015 with pCO2 6 and anion gap 35 and hyperkalemia.  Started on insulin gtt in ED and given 20 units subQ long acting insulin.    - Started on DKA protocol in the ICU, now on subq insulin  - Endocrine consulted, adjusting insulin  - Advanced diet   - ACHS, hypoglycemia protocol , SSI

## 2022-04-16 NOTE — NURSING
2000  Received report for pt from AM Nurse. Pt resting in bed. AAOx4; RA. Tele box in place. No c/o pain. Education provided regarding safety and fall prevention. Safety check performed. Call bell within reach. Will continue to monitor.    0000  Pt resting in bed. AAOx4; RA. Tele box in place. Medication administered per MAR. No distress/changes noted. Safety check performed. Call bell within reach. Will continue to monitor.

## 2022-04-16 NOTE — PROGRESS NOTES
[facility] - Telemetry Stepdown  Endocrinology  Progress Note    Admit Date: 4/11/2022     76 year old  male with T2DM, HTN, CAD, STEMI, HLD, paroxysmal Afib, CVA, seizures who presents for recurrent DKA.  He presented to [facility] ED on 4/11 with elevated blood glucose.  He was discharged from [facility] on 4/10 after being admitted for DKA on 4/5. Per family he was not feeling well after discharge to home and vomited several times on 4/11. Finger stick at home read High with unreadable blood glucose.  At this time daughter gave him 14 units of insulin and sublingual zofran. Other than malaise and nausea patient was not exhibiting any other signs/symptoms at home.    - In ER BG found to be 1015 with pCO2 6 and anion gap 35.  Hyperkalemic with K 7.0 and some EKG changes when compared to previous EKG.  Given calcium gluconate, and started on insulin gtt as well as subQ long acting insulin     - Endocrinology consulted for management of type 2 diabetes after resolution of DKA    - Spoke with daughter who states patient was discharged on 04/10/2022 with high glucose in the 400s which worsened after getting home and eating dinner; appropriate mealtime and correction insulin were given at that time. However, the next day patient's condition was worsening and they called EMS and his sugar was found to be in the thousands. She expresses concern that he cannot be care for adequately at home any longer and wishes to pursue skilled nursing facility.    Regarding Diabetes Mellitus:    Recurring episodes of DKA  Surgical Procedure and Date: NA  Diabetes diagnosis year: >20 years  Home Diabetes Medications:  During recent SNF was on Aspart 8 TID and glargine 8 units daily with sliding scale  How often checking glucose at home? >4 x day   Diabetes Complications include: Hyperglycemia, Hypoglycemia , and Diabetic peripheral neuropathy   Complicating diabetes co morbidities: History of CVA      Interval HPI:   Overnight events:   "Hypoglycemic overnight with fingerstick value at 56 near midnight, otherwise no acute events  Eatin%  Nausea: No  Hypoglycemia and intervention: Yes  Fever: No  TPN and/or TF: No  If yes, type of TF/TPN and rate: NA    BP (!) 172/78   Pulse 62   Temp 96.7 °F (35.9 °C) (Oral)   Resp 18   Ht 5' 11" (1.803 m)   Wt 81.6 kg (180 lb)   SpO2 (!) 94%   BMI 25.10 kg/m²     Labs Reviewed and Include    Recent Labs   Lab 22  0159   GLU 51*   CALCIUM 8.5*   ALBUMIN 2.4*   PROT 5.9*      K 3.9   CO2 26      BUN 10   CREATININE 0.8   ALKPHOS 138*   ALT 16   AST 23   BILITOT 0.6     Lab Results   Component Value Date    WBC 6.61 2022    HGB 11.5 (L) 2022    HCT 35.7 (L) 2022    MCV 89 2022     2022     No results for input(s): TSH, FREET4 in the last 168 hours.  Lab Results   Component Value Date    HGBA1C 9.7 (H) 2022       Nutritional status:   Body mass index is 25.1 kg/m².  Lab Results   Component Value Date    ALBUMIN 2.4 (L) 2022    ALBUMIN 2.3 (L) 04/15/2022    ALBUMIN 3.0 (L) 2022     No results found for: PREALBUMIN    Estimated Creatinine Clearance: 81.1 mL/min (based on SCr of 0.8 mg/dL).    Accu-Checks  Recent Labs     22  2224 04/15/22  0044 04/15/22  0631 04/15/22  0739 04/15/22  1124 04/15/22  1528 04/15/22  2335 22  0303 22  0625 22  0719   POCTGLUCOSE 140* 123* 73 92 120* 179* 56* 119* 191* 216*       Current Medications and/or Treatments Impacting Glycemic Control  Immunotherapy:    Immunosuppressants       None          Steroids:   Hormones (From admission, onward)                Start     Stop Route Frequency Ordered    22 1047  melatonin tablet 6 mg  (Medication Panel)         -- Oral Nightly PRN 22 0948          Pressors:    Autonomic Drugs (From admission, onward)                None          Hyperglycemia/Diabetes Medications:   Antihyperglycemics (From admission, onward)                " Start     Stop Route Frequency Ordered    22 0900  insulin detemir U-100 pen 8 Units         -- SubQ 2 times daily 22 0822    04/15/22 1130  insulin aspart U-100 pen 8 Units         -- SubQ 3 times daily with meals 04/15/22 0949    22 0819  insulin aspart U-100 pen 1-10 Units         -- SubQ Every 6 hours PRN 22 0720            ASSESSMENT and PLAN    * Ketosis-prone diabetes mellitus  Key History and Diagnostic Findings  - A1c of 9.7 on 2022 from 11.5 on 2022    - Home Regimen: Levemir 8 units daily, aspart 8 units TIDWM  - Weight based dosin kg x 0.5 = 41 TDD x 0.5 = 20 basal / 20 prandial  - 1700/TDD = 41 (estimated insulin sensitivity factor)  - 450/TDD = 11 (estimated starting carb ratio for prandial dosing)  - Glucose Goals: 160-200mg/dL  - 24 hour glucose trend:  Good control overall but did have hypoglycemic event of 56 at roughly midnight    Plan  - Decrease levemir 10 down to 8 units twice daily starting this morning  - Continue aspart 8 units TIDWM with moderate correction  - basal/prandial (40%/60%)  - Daughter wishes to pursue skilled nursing facility for discharge as she states he cannot be adequately taken care of at home    - Hypoglycemia protocol in place  - If blood glucose greater than 300, please ask patient not to eat food or drink anything other than water until correctional insulin has brought it back below 250  - Please ensure patient receives their p.r.n. insulin aspart if they eat a snack with more than 30 g of carbohydrate    BRENDAN (acute kidney injury)  - Resolved, decreased renal function can cause decreased renal clearance and result in insulin stacking increasing risk of hypoglycemia    Coronary artery disease  - Strive to optimize glucose control        Yeison Hinton DO  [facility], [address]th [address]  [address] [place], [address]    Office: [phone]  Fax: [phone]    Disclaimer: This note has  been generated using voice-recognition software. There may be typographical errors that have been missed during proof-reading.    The above history labs imaging impression and plan were discussed with attending physician who is in agreement and also took part in this patient's care.  I personally reviewed all of the patients available medications, labs, imaging, vitals, allergies, medical history.

## 2022-04-16 NOTE — PLAN OF CARE
"Patient received, awake and alert, VS per flowsheets. PCOT blood glucose checks ACHS, insulin administered per MAR. Patient with poor appetite due to food options. Patient ambulates in room to bathroom with walker and 1 person assist. Incontinent of urine and stools at times. Tele monitoring continued, remains in NSR. Patient with excoriation to scrotum and perineal area, barrier cream applied. Patient educated on importance to offload pressure, patient shifts weight independently in bed. Patient refuses wedge or pillow to assist in offloading, states "it hurts worse that way". Patient lays on his sides to prevent pressure on his sacrum. Mepilex in place. Fall precautions in place, call bell within reach, no needs at this time.     Problem: Adult Inpatient Plan of Care  Goal: Plan of Care Review  Outcome: Ongoing, Progressing  Goal: Patient-Specific Goal (Individualized)  Outcome: Ongoing, Progressing  Goal: Absence of Hospital-Acquired Illness or Injury  Outcome: Ongoing, Progressing  Goal: Optimal Comfort and Wellbeing  Outcome: Ongoing, Progressing  Goal: Readiness for Transition of Care  Outcome: Ongoing, Progressing     Problem: Diabetes Comorbidity  Goal: Blood Glucose Level Within Targeted Range  Outcome: Ongoing, Progressing     Problem: Fluid and Electrolyte Imbalance (Acute Kidney Injury/Impairment)  Goal: Fluid and Electrolyte Balance  Outcome: Ongoing, Progressing     Problem: Oral Intake Inadequate (Acute Kidney Injury/Impairment)  Goal: Optimal Nutrition Intake  Outcome: Ongoing, Progressing     Problem: Renal Function Impairment (Acute Kidney Injury/Impairment)  Goal: Effective Renal Function  Outcome: Ongoing, Progressing     Problem: Impaired Wound Healing  Goal: Optimal Wound Healing  Outcome: Ongoing, Progressing     Problem: Skin Injury Risk Increased  Goal: Skin Health and Integrity  Outcome: Ongoing, Progressing     Problem: Diabetic Ketoacidosis  Goal: Fluid and Electrolyte Balance with " Absence of Ketosis  Outcome: Ongoing, Progressing     Problem: Fall Injury Risk  Goal: Absence of Fall and Fall-Related Injury  Outcome: Ongoing, Progressing

## 2022-04-16 NOTE — SUBJECTIVE & OBJECTIVE
"Interval HPI:   Overnight events:  Hypoglycemic overnight with fingerstick value at 56 near midnight, otherwise no acute events  Eatin%  Nausea: No  Hypoglycemia and intervention: Yes  Fever: No  TPN and/or TF: No  If yes, type of TF/TPN and rate: NA    BP (!) 172/78   Pulse 62   Temp 96.7 °F (35.9 °C) (Oral)   Resp 18   Ht 5' 11" (1.803 m)   Wt 81.6 kg (180 lb)   SpO2 (!) 94%   BMI 25.10 kg/m²     Labs Reviewed and Include    Recent Labs   Lab 22  0159   GLU 51*   CALCIUM 8.5*   ALBUMIN 2.4*   PROT 5.9*      K 3.9   CO2 26      BUN 10   CREATININE 0.8   ALKPHOS 138*   ALT 16   AST 23   BILITOT 0.6     Lab Results   Component Value Date    WBC 6.61 2022    HGB 11.5 (L) 2022    HCT 35.7 (L) 2022    MCV 89 2022     2022     No results for input(s): TSH, FREET4 in the last 168 hours.  Lab Results   Component Value Date    HGBA1C 9.7 (H) 2022       Nutritional status:   Body mass index is 25.1 kg/m².  Lab Results   Component Value Date    ALBUMIN 2.4 (L) 2022    ALBUMIN 2.3 (L) 04/15/2022    ALBUMIN 3.0 (L) 2022     No results found for: PREALBUMIN    Estimated Creatinine Clearance: 81.1 mL/min (based on SCr of 0.8 mg/dL).    Accu-Checks  Recent Labs     22  2224 04/15/22  0044 04/15/22  0631 04/15/22  0739 04/15/22  1124 04/15/22  1528 04/15/22  2335 22  0303 22  0625 22  0719   POCTGLUCOSE 140* 123* 73 92 120* 179* 56* 119* 191* 216*       Current Medications and/or Treatments Impacting Glycemic Control  Immunotherapy:    Immunosuppressants       None          Steroids:   Hormones (From admission, onward)                Start     Stop Route Frequency Ordered    22 1047  melatonin tablet 6 mg  (Medication Panel)         -- Oral Nightly PRN 22 0965          Pressors:    Autonomic Drugs (From admission, onward)                None          Hyperglycemia/Diabetes Medications:   Antihyperglycemics " (From admission, onward)                Start     Stop Route Frequency Ordered    04/16/22 0900  insulin detemir U-100 pen 8 Units         -- SubQ 2 times daily 04/16/22 0822    04/15/22 1130  insulin aspart U-100 pen 8 Units         -- SubQ 3 times daily with meals 04/15/22 0949    04/13/22 0819  insulin aspart U-100 pen 1-10 Units         -- SubQ Every 6 hours PRN 04/13/22 0720

## 2022-04-16 NOTE — CONSULTS
[facility] - [facility] Medicine  Telemedicine Consult Note    Patient Name: Kamar Muñoz  MRN: [id number]  Admission Date: 4/11/2022  Hospital Length of Stay: 4 days  Attending Physician: Siva Hassan MD   Primary Care Provider: Basim Guerrero MD     Thank you for your consult to [facility]. We have reviewed the patient chart. This patient does not meet criteria for [facility] service at this time due to [facility] service capacity limitations. Will hand back to In-house service..          Tiffany Awad MD  Department of Hospital Medicine   [facility]

## 2022-04-17 LAB
ALBUMIN SERPL BCP-MCNC: 2.4 G/DL (ref 3.5–5.2)
ALP SERPL-CCNC: 134 U/L (ref 55–135)
ALT SERPL W/O P-5'-P-CCNC: 20 U/L (ref 10–44)
ANION GAP SERPL CALC-SCNC: 13 MMOL/L (ref 8–16)
AST SERPL-CCNC: 26 U/L (ref 10–40)
BACTERIA BLD CULT: NORMAL
BACTERIA BLD CULT: NORMAL
BASOPHILS # BLD AUTO: 0.03 K/UL (ref 0–0.2)
BASOPHILS NFR BLD: 0.5 % (ref 0–1.9)
BILIRUB SERPL-MCNC: 0.5 MG/DL (ref 0.1–1)
BUN SERPL-MCNC: 13 MG/DL (ref 8–23)
CALCIUM SERPL-MCNC: 8.3 MG/DL (ref 8.7–10.5)
CHLORIDE SERPL-SCNC: 99 MMOL/L (ref 95–110)
CO2 SERPL-SCNC: 22 MMOL/L (ref 23–29)
CREAT SERPL-MCNC: 0.9 MG/DL (ref 0.5–1.4)
DIFFERENTIAL METHOD: ABNORMAL
EOSINOPHIL # BLD AUTO: 0.3 K/UL (ref 0–0.5)
EOSINOPHIL NFR BLD: 4 % (ref 0–8)
ERYTHROCYTE [DISTWIDTH] IN BLOOD BY AUTOMATED COUNT: 15.6 % (ref 11.5–14.5)
EST. GFR  (AFRICAN AMERICAN): >60 ML/MIN/1.73 M^2
EST. GFR  (NON AFRICAN AMERICAN): >60 ML/MIN/1.73 M^2
GLUCOSE SERPL-MCNC: 326 MG/DL (ref 70–110)
HCT VFR BLD AUTO: 36.4 % (ref 40–54)
HGB BLD-MCNC: 12 G/DL (ref 14–18)
IMM GRANULOCYTES # BLD AUTO: 0.02 K/UL (ref 0–0.04)
IMM GRANULOCYTES NFR BLD AUTO: 0.3 % (ref 0–0.5)
LYMPHOCYTES # BLD AUTO: 1.3 K/UL (ref 1–4.8)
LYMPHOCYTES NFR BLD: 20.6 % (ref 18–48)
MAGNESIUM SERPL-MCNC: 1.6 MG/DL (ref 1.6–2.6)
MCH RBC QN AUTO: 29.1 PG (ref 27–31)
MCHC RBC AUTO-ENTMCNC: 33 G/DL (ref 32–36)
MCV RBC AUTO: 88 FL (ref 82–98)
MONOCYTES # BLD AUTO: 0.6 K/UL (ref 0.3–1)
MONOCYTES NFR BLD: 9.5 % (ref 4–15)
NEUTROPHILS # BLD AUTO: 4.2 K/UL (ref 1.8–7.7)
NEUTROPHILS NFR BLD: 65.1 % (ref 38–73)
NRBC BLD-RTO: 0 /100 WBC
PHOSPHATE SERPL-MCNC: 2.8 MG/DL (ref 2.7–4.5)
PLATELET # BLD AUTO: 189 K/UL (ref 150–450)
PMV BLD AUTO: 9.9 FL (ref 9.2–12.9)
POCT GLUCOSE: 154 MG/DL (ref 70–110)
POCT GLUCOSE: 231 MG/DL (ref 70–110)
POCT GLUCOSE: 261 MG/DL (ref 70–110)
POCT GLUCOSE: 310 MG/DL (ref 70–110)
POCT GLUCOSE: 325 MG/DL (ref 70–110)
POCT GLUCOSE: 326 MG/DL (ref 70–110)
POCT GLUCOSE: 456 MG/DL (ref 70–110)
POCT GLUCOSE: 500 MG/DL (ref 70–110)
POCT GLUCOSE: 89 MG/DL (ref 70–110)
POTASSIUM SERPL-SCNC: 4.2 MMOL/L (ref 3.5–5.1)
PROT SERPL-MCNC: 6 G/DL (ref 6–8.4)
RBC # BLD AUTO: 4.13 M/UL (ref 4.6–6.2)
SODIUM SERPL-SCNC: 134 MMOL/L (ref 136–145)
WBC # BLD AUTO: 6.45 K/UL (ref 3.9–12.7)

## 2022-04-17 PROCEDURE — 99232 SBSQ HOSP IP/OBS MODERATE 35: CPT | Mod: ,,, | Performed by: INTERNAL MEDICINE

## 2022-04-17 PROCEDURE — 25000003 PHARM REV CODE 250: Performed by: INTERNAL MEDICINE

## 2022-04-17 PROCEDURE — 83735 ASSAY OF MAGNESIUM: CPT | Performed by: INTERNAL MEDICINE

## 2022-04-17 PROCEDURE — 80053 COMPREHEN METABOLIC PANEL: CPT | Performed by: INTERNAL MEDICINE

## 2022-04-17 PROCEDURE — 20600001 HC STEP DOWN PRIVATE ROOM

## 2022-04-17 PROCEDURE — 99232 SBSQ HOSP IP/OBS MODERATE 35: CPT | Mod: GC,,, | Performed by: INTERNAL MEDICINE

## 2022-04-17 PROCEDURE — 63600175 PHARM REV CODE 636 W HCPCS: Performed by: HOSPITALIST

## 2022-04-17 PROCEDURE — 85025 COMPLETE CBC W/AUTO DIFF WBC: CPT | Performed by: INTERNAL MEDICINE

## 2022-04-17 PROCEDURE — 36415 COLL VENOUS BLD VENIPUNCTURE: CPT | Performed by: INTERNAL MEDICINE

## 2022-04-17 PROCEDURE — 25000003 PHARM REV CODE 250: Performed by: HOSPITALIST

## 2022-04-17 PROCEDURE — 63600175 PHARM REV CODE 636 W HCPCS: Performed by: STUDENT IN AN ORGANIZED HEALTH CARE EDUCATION/TRAINING PROGRAM

## 2022-04-17 PROCEDURE — 99232 PR SUBSEQUENT HOSPITAL CARE,LEVL II: ICD-10-PCS | Mod: ,,, | Performed by: INTERNAL MEDICINE

## 2022-04-17 PROCEDURE — 99232 PR SUBSEQUENT HOSPITAL CARE,LEVL II: ICD-10-PCS | Mod: GC,,, | Performed by: INTERNAL MEDICINE

## 2022-04-17 PROCEDURE — 84100 ASSAY OF PHOSPHORUS: CPT | Performed by: INTERNAL MEDICINE

## 2022-04-17 PROCEDURE — 63600175 PHARM REV CODE 636 W HCPCS: Performed by: INTERNAL MEDICINE

## 2022-04-17 RX ORDER — INSULIN ASPART 100 [IU]/ML
3 INJECTION, SOLUTION INTRAVENOUS; SUBCUTANEOUS ONCE
Status: COMPLETED | OUTPATIENT
Start: 2022-04-17 | End: 2022-04-17

## 2022-04-17 RX ORDER — HYDRALAZINE HYDROCHLORIDE 25 MG/1
25 TABLET, FILM COATED ORAL EVERY 8 HOURS PRN
Status: DISCONTINUED | OUTPATIENT
Start: 2022-04-17 | End: 2022-05-03 | Stop reason: HOSPADM

## 2022-04-17 RX ORDER — INSULIN ASPART 100 [IU]/ML
10 INJECTION, SOLUTION INTRAVENOUS; SUBCUTANEOUS
Status: DISCONTINUED | OUTPATIENT
Start: 2022-04-17 | End: 2022-04-19

## 2022-04-17 RX ORDER — INSULIN ASPART 100 [IU]/ML
4 INJECTION, SOLUTION INTRAVENOUS; SUBCUTANEOUS
Status: DISCONTINUED | OUTPATIENT
Start: 2022-04-17 | End: 2022-05-03 | Stop reason: HOSPADM

## 2022-04-17 RX ORDER — INSULIN ASPART 100 [IU]/ML
10 INJECTION, SOLUTION INTRAVENOUS; SUBCUTANEOUS ONCE
Status: COMPLETED | OUTPATIENT
Start: 2022-04-17 | End: 2022-04-17

## 2022-04-17 RX ADMIN — ONDANSETRON 8 MG: 2 INJECTION INTRAMUSCULAR; INTRAVENOUS at 03:04

## 2022-04-17 RX ADMIN — APIXABAN 5 MG: 5 TABLET, FILM COATED ORAL at 08:04

## 2022-04-17 RX ADMIN — PANTOPRAZOLE SODIUM 40 MG: 40 TABLET, DELAYED RELEASE ORAL at 08:04

## 2022-04-17 RX ADMIN — HYDRALAZINE HYDROCHLORIDE 25 MG: 25 TABLET, FILM COATED ORAL at 01:04

## 2022-04-17 RX ADMIN — AMIODARONE HYDROCHLORIDE 200 MG: 200 TABLET ORAL at 08:04

## 2022-04-17 RX ADMIN — INSULIN ASPART 4 UNITS: 100 INJECTION, SOLUTION INTRAVENOUS; SUBCUTANEOUS at 05:04

## 2022-04-17 RX ADMIN — LACOSAMIDE 100 MG: 100 TABLET, FILM COATED ORAL at 08:04

## 2022-04-17 RX ADMIN — INSULIN ASPART 3 UNITS: 100 INJECTION, SOLUTION INTRAVENOUS; SUBCUTANEOUS at 09:04

## 2022-04-17 RX ADMIN — INSULIN ASPART 10 UNITS: 100 INJECTION, SOLUTION INTRAVENOUS; SUBCUTANEOUS at 08:04

## 2022-04-17 RX ADMIN — CLOPIDOGREL 75 MG: 75 TABLET, FILM COATED ORAL at 08:04

## 2022-04-17 RX ADMIN — SENNOSIDES AND DOCUSATE SODIUM 1 TABLET: 50; 8.6 TABLET ORAL at 08:04

## 2022-04-17 RX ADMIN — METOPROLOL SUCCINATE 50 MG: 50 TABLET, EXTENDED RELEASE ORAL at 08:04

## 2022-04-17 RX ADMIN — INSULIN ASPART 10 UNITS: 100 INJECTION, SOLUTION INTRAVENOUS; SUBCUTANEOUS at 12:04

## 2022-04-17 RX ADMIN — INSULIN HUMAN 8 UNITS: 100 INJECTION, SOLUTION PARENTERAL at 02:04

## 2022-04-17 RX ADMIN — ATORVASTATIN CALCIUM 40 MG: 20 TABLET, FILM COATED ORAL at 08:04

## 2022-04-17 NOTE — ASSESSMENT & PLAN NOTE
- Resolved  - Unclear inciting etiology missed insulin doses vs sepsis.    - Recent admission 4/5 for DKA and discharged from [facility] 4[place]10.  Per family malaise since discharge with vomiting starting 4/10.  BG at home greater unreadable.  Given 14 units subQ insulin at that time.  In ER BG found to be 1015 with pCO2 6 and anion gap 35 and hyperkalemia.  Started on insulin gtt in ED and given 20 units subQ long acting insulin.    - Started on DKA protocol in the ICU, now on subq insulin  - Endocrine consulted, adjusting insulin  - Advanced diet   - ACHS, hypoglycemia protocol , SSI

## 2022-04-17 NOTE — SUBJECTIVE & OBJECTIVE
Interval History: No issues this morning. Glucose labile. Oral intake improving. Continues to have weakness. Aox4. Endocrine following, titrating insulin. No complaints at this time. Planning SNF on discharge.    Review of Systems   Constitutional:  Positive for appetite change and weakness. Negative for fever.   HENT:  Negative for rhinorrhea and sore throat.    Respiratory:  Negative for cough and shortness of breath.    Cardiovascular:  Negative for chest pain and palpitations.   Gastrointestinal:  Negative for pain, N/V diarrhea.   Genitourinary:  Negative for difficulty urinating and hematuria.   Musculoskeletal:  Negative for back pain and neck pain.   Skin:  Negative for rash and wound.   Neurological:  Negative for tremors and headaches.   All other systems reviewed and are negative.    Objective:     Vital Signs (Most Recent):  Temp: 97.6 °F (36.4 °C) (04/17/22 1144)  Pulse: 66 (04/17/22 1144)  Resp: 18 (04/17/22 1144)  BP: 134/76 (04/17/22 1144)  SpO2: (!) 94 % (04/17/22 1144)   Vital Signs (24h Range):  Temp:  [97.6 °F (36.4 °C)-98.4 °F (36.9 °C)] 97.6 °F (36.4 °C)  Pulse:  [66-67] 66  Resp:  [18] 18  SpO2:  [94 %-99 %] 94 %  BP: (132-175)/(64-80) 134/76     Weight: 81.6 kg (180 lb)  Body mass index is 25.1 kg/m².    Intake/Output Summary (Last 24 hours) at 4/17/2022 1240  Last data filed at 4/17/2022 0331  Gross per 24 hour   Intake --   Output 450 ml   Net -450 ml        Physical Exam  Vitals and nursing note reviewed.   Constitutional:       General: He is not in acute distress.     Appearance: Normal appearance. He is normal weight. He is not ill-appearing, toxic-appearing or diaphoretic.   HENT:      Head: Normocephalic and atraumatic.      Right Ear: External ear normal.      Left Ear: External ear normal.      Nose: Nose normal.   Cardiovascular:      Rate and Rhythm: Normal rate and regular rhythm.      Pulses: Normal pulses.      Heart sounds: Normal heart sounds. No murmur heard.    No friction  rub. No gallop.   Pulmonary:      Effort: Pulmonary effort is normal. No respiratory distress.      Breath sounds: Normal breath sounds. No wheezing, rhonchi or rales.   Abdominal:      General: Abdomen is flat. Bowel sounds are normal. There is no distension.      Palpations: Abdomen is soft.      Tenderness: There is no abdominal tenderness. There is no guarding or rebound.   Musculoskeletal:         General: No swelling. Normal range of motion.   Skin:     General: Skin is warm and dry.      Coloration: Skin is not jaundiced.   Neurological:      General: No focal deficit present.      Mental Status: He is alert and oriented to person, place, and time. Mental status is at baseline.   Psychiatric:         Mood and Affect: Mood normal.         Behavior: Behavior normal.         Thought Content: Thought content normal.         Judgment: Judgment normal.     Significant Labs: All pertinent labs within the past 24 hours have been reviewed.    Significant Imaging: I have reviewed all pertinent imaging results/findings within the past 24 hours.

## 2022-04-17 NOTE — SUBJECTIVE & OBJECTIVE
"Interval HPI:   Overnight events:  No acute events though did have hyperglycemic excursion after dinner that persisted into the morning  Eatin%  Nausea: No  Hypoglycemia and intervention: No  Fever: No  TPN and/or TF: No  If yes, type of TF/TPN and rate: NA    BP (!) 144/70 (BP Location: Right arm, Patient Position: Lying)   Pulse 66   Temp 97.8 °F (36.6 °C) (Oral)   Resp 18   Ht 5' 11" (1.803 m)   Wt 81.6 kg (180 lb)   SpO2 98%   BMI 25.10 kg/m²     Labs Reviewed and Include    No results for input(s): GLU, CALCIUM, ALBUMIN, PROT, NA, K, CO2, CL, BUN, CREATININE, ALKPHOS, ALT, AST, BILITOT in the last 24 hours.  Lab Results   Component Value Date    WBC 6.61 2022    HGB 11.5 (L) 2022    HCT 35.7 (L) 2022    MCV 89 2022     2022     No results for input(s): TSH, FREET4 in the last 168 hours.  Lab Results   Component Value Date    HGBA1C 9.7 (H) 2022       Nutritional status:   Body mass index is 25.1 kg/m².  Lab Results   Component Value Date    ALBUMIN 2.4 (L) 2022    ALBUMIN 2.3 (L) 04/15/2022    ALBUMIN 3.0 (L) 2022     No results found for: PREALBUMIN    Estimated Creatinine Clearance: 81.1 mL/min (based on SCr of 0.8 mg/dL).    Accu-Checks  Recent Labs     22  0625 22  0719 22  1126 22  1635 22  2345 22  2354 22  0059 22  0239 22  0422 22  0722   POCTGLUCOSE 191* 216* 241* 296* >500* 483* 500* 456* 310* 325*       Current Medications and/or Treatments Impacting Glycemic Control  Immunotherapy:    Immunosuppressants       None          Steroids:   Hormones (From admission, onward)                Start     Stop Route Frequency Ordered    22 1047  melatonin tablet 6 mg  (Medication Panel)         -- Oral Nightly PRN 22 0948          Pressors:    Autonomic Drugs (From admission, onward)                None          Hyperglycemia/Diabetes Medications:   Antihyperglycemics (From " admission, onward)                Start     Stop Route Frequency Ordered    04/17/22 1130  insulin aspart U-100 pen 10 Units         -- SubQ 3 times daily with meals 04/17/22 0757    04/17/22 0111  insulin aspart U-100 pen 4 Units         -- SubQ With snacks 04/17/22 0012    04/16/22 0900  insulin detemir U-100 pen 8 Units         -- SubQ 2 times daily 04/16/22 0822    04/13/22 0819  insulin aspart U-100 pen 1-10 Units         -- SubQ Every 6 hours PRN 04/13/22 0720

## 2022-04-17 NOTE — ASSESSMENT & PLAN NOTE
Key History and Diagnostic Findings  - A1c of 9.7 on 2022 from 11.5 on 2022    - Home Regimen: Levemir 8 units daily, aspart 8 units TIDWM  - Weight based dosin kg x 0.5 = 41 TDD x 0.5 = 20 basal / 20 prandial  - 1700/TDD = 41 (estimated insulin sensitivity factor)  - 450/TDD = 11 (estimated starting carb ratio for prandial dosing)  - Glucose Goals: 160-200mg/dL  - 24 hour glucose trend:  Had adequate control most of yesterday until after dinner with significant hyperglycemic excursion into the 300s that persisted into morning.  He was given 10 units of correctional aspart and breakfast was held until less than 250, then scheduled MDI resumed.    Plan  - Continue levemir 8 units twice daily  - Increase aspart from 8 up to 10 units TIDWM with moderate correction  - basal/prandial (35%/65%)  - Daughter wishes to pursue skilled nursing facility for discharge as she states he cannot be adequately taken care of at home    - Hypoglycemia protocol in place  - If blood glucose greater than 300, please ask patient not to eat food or drink anything other than water until correctional insulin has brought it back below 250  - Please ensure patient receives their p.r.n. insulin aspart if they eat a snack with more than 30 g of carbohydrate

## 2022-04-17 NOTE — NURSING
2000  Received report for pt from AM Nurse. Pt resting in bed. AAOx4; RA. VSS. No c/o pain/distress. Education provided regarding safety and fall prevention. Safety check performed. Call bell within reach. Will continue to monitor.    0000  Pt resting in bed. Glucose 483; on-call provider notified. No c/o pain. Medication administered per MAR. Safety check performed. Call bell within reach. Will continue to monitor.

## 2022-04-17 NOTE — PROGRESS NOTES
[facility] - Telemetry [facility] Medicine  Progress Note    Patient Name: Kamar Muñoz  MRN: [id number]  Patient Class: IP- Inpatient   Admission Date: 4/11/2022  Length of Stay: 6 days  Attending Physician: Siva Hassan MD  Primary Care Provider: Basim Guerrero MD        Subjective:     Principal Problem:Diabetic ketoacidosis without coma associated with type 2 diabetes mellitus        HPI:  Mr. Kamar Muñoz is a 76 y.o. male with a past medical history of HTN, Type 2 DM, CAD, STEMI, HLD, paroxysmal Afib, CVA, seizures and recurrent DKA.  He presented to [facility] ED on 4/11 with elevated blood glucose.  He was discharged from [facility] on 4/10 after being admitted for DKA on 4/5.  At time of exam patient is alert but altered and unable to provide any history.  Spoke with patient's daughter who states she is a primary caregiver at home and obtained history as well as review of chart.  Per family he was not feeling well after discharge to home and vomited several times on 4/11. Unknown characteristics of emesis. Finger stick at home read High with unreadable blood glucose.  At this time daughter gave him 14 units of insulin and sublingual zofran. Other than malaise and nausea patient was not exhibiting any other signs/symptoms at home.  In ER BG found to be 1015 with pCO2 6 and anion gap 35.  Hyperkalemic with K 7.0 and some EKG changes when compared to previous EKG.  Given calcium gluconate, and started on insulin gtt as well as subQ long acting insulin.      Critical Care Medicine consulted for DKA and admitted to MICU.        Overview/Hospital Course:  Admitted to MICU on 4/11 for DKA with BG >1000, pCO2 6, AG 35, and hyperkalemia.  Given Calcium gluconate and started on insulin gtt in ED.  Hyperkalemia not improved on repeat labs and bolused with IV insulin.  Infectious workup sent and broad spectrum abx started.  4/12 potassium improving with insulin. 4/13 Gap closed, insulin gtt turned off and  switched to subq insulin. Endocrine consulted for insulin mgmt. He was stepped down to  4/14.    Since step down stable. Endocrine following and titrating insulin. Advanced diet. PT OT ordered for safe dispo plan rec SNF.      Interval History: No issues this morning. Glucose labile. Oral intake improving. Continues to have weakness. Aox4. Endocrine following, titrating insulin. No complaints at this time. Planning SNF on discharge.    Review of Systems   Constitutional:  Positive for appetite change and weakness. Negative for fever.   HENT:  Negative for rhinorrhea and sore throat.    Respiratory:  Negative for cough and shortness of breath.    Cardiovascular:  Negative for chest pain and palpitations.   Gastrointestinal:  Negative for pain, N/V diarrhea.   Genitourinary:  Negative for difficulty urinating and hematuria.   Musculoskeletal:  Negative for back pain and neck pain.   Skin:  Negative for rash and wound.   Neurological:  Negative for tremors and headaches.   All other systems reviewed and are negative.    Objective:     Vital Signs (Most Recent):  Temp: 97.6 °F (36.4 °C) (04/17/22 1144)  Pulse: 66 (04/17/22 1144)  Resp: 18 (04/17/22 1144)  BP: 134/76 (04/17/22 1144)  SpO2: (!) 94 % (04/17/22 1144)   Vital Signs (24h Range):  Temp:  [97.6 °F (36.4 °C)-98.4 °F (36.9 °C)] 97.6 °F (36.4 °C)  Pulse:  [66-67] 66  Resp:  [18] 18  SpO2:  [94 %-99 %] 94 %  BP: (132-175)/(64-80) 134/76     Weight: 81.6 kg (180 lb)  Body mass index is 25.1 kg/m².    Intake/Output Summary (Last 24 hours) at 4/17/2022 1240  Last data filed at 4/17/2022 0331  Gross per 24 hour   Intake --   Output 450 ml   Net -450 ml        Physical Exam  Vitals and nursing note reviewed.   Constitutional:       General: He is not in acute distress.     Appearance: Normal appearance. He is normal weight. He is not ill-appearing, toxic-appearing or diaphoretic.   HENT:      Head: Normocephalic and atraumatic.      Right Ear: External ear normal.       Left Ear: External ear normal.      Nose: Nose normal.   Cardiovascular:      Rate and Rhythm: Normal rate and regular rhythm.      Pulses: Normal pulses.      Heart sounds: Normal heart sounds. No murmur heard.    No friction rub. No gallop.   Pulmonary:      Effort: Pulmonary effort is normal. No respiratory distress.      Breath sounds: Normal breath sounds. No wheezing, rhonchi or rales.   Abdominal:      General: Abdomen is flat. Bowel sounds are normal. There is no distension.      Palpations: Abdomen is soft.      Tenderness: There is no abdominal tenderness. There is no guarding or rebound.   Musculoskeletal:         General: No swelling. Normal range of motion.   Skin:     General: Skin is warm and dry.      Coloration: Skin is not jaundiced.   Neurological:      General: No focal deficit present.      Mental Status: He is alert and oriented to person, place, and time. Mental status is at baseline.   Psychiatric:         Mood and Affect: Mood normal.         Behavior: Behavior normal.         Thought Content: Thought content normal.         Judgment: Judgment normal.     Significant Labs: All pertinent labs within the past 24 hours have been reviewed.    Significant Imaging: I have reviewed all pertinent imaging results/findings within the past 24 hours.      Assessment/Plan:      * Diabetic ketoacidosis without coma associated with type 2 diabetes mellitus  - Resolved  - Unclear inciting etiology missed insulin doses vs sepsis.    - Recent admission 4/5 for DKA and discharged from [facility] 4/10.  Per family malaise since discharge with vomiting starting 4/10.  BG at home greater unreadable.  Given 14 units subQ insulin at that time.  In ER BG found to be 1015 with pCO2 6 and anion gap 35 and hyperkalemia.  Started on insulin gtt in ED and given 20 units subQ long acting insulin.    - Started on DKA protocol in the ICU, now on subq insulin  - Endocrine consulted, adjusting insulin  - Advanced diet   - ACHS,  hypoglycemia protocol , SSI    Lactic acidosis  - LA 10.3 on hospital admission.  Concern for infectious process given WBC on admission 17.05, on 4/10 prior to discharge WBC 6.82. CXR with bilateral interstitial pattern, no focal areas of consolidation.    - Procalcitonin 1.18  - Blood cultures NGTD  - Antibiotics discontinued in the ICU  - Low suspicion for any infectious etiology , likely from DKA       Goals of care, counseling/discussion  - DNR as per ICU    Ketosis-prone diabetes mellitus  - see DKA      Focal seizures  - History of seizures on lacosamide.    - Continue home lacosamide    Coronary artery disease  - Stable  - Continue home regimen of aspirin, atorvastatin, clopidogrel, losartan, and metoprolol      Paroxysmal atrial fibrillation  - History of afib.    - Continue home regimen of amiodarone, apixaban, and metoprolol        BRENDAN (acute kidney injury)  - Resolved  - Likely secondary to dehydration in the setting of DKA.  Baseline Cr per chart review ~1.0.  Cr on admission 2.3.     Essential hypertension  - resume home meds as able        VTE Risk Mitigation (From admission, onward)         Ordered     apixaban tablet 5 mg  2 times daily         04/12/22 0142     IP VTE HIGH RISK PATIENT  Once         04/11/22 2342     Place sequential compression device  Until discontinued         04/11/22 2138                Discharge Planning   ALLIE: 4/20/2022     Code Status: DNR   Is the patient medically ready for discharge?: Yes    Reason for patient still in hospital (select all that apply): Pending disposition  Discharge Plan A: Home Health   Discharge Delays: None known at this time        Siva Hassan MD  Department of Hospital Medicine   [facility] - Telemetry Stepdown

## 2022-04-17 NOTE — PLAN OF CARE
"Patient received, awake and alert, vital signs per flowsheet. Blood glucose checked, 325 before breakfast. RN instructed by MD Hinton to hold breakfast, administer 10 units aspart insulin, recheck in 1 hour. Recheck 261, RN received telephone order to administer 3 units aspart insulin one time, patient can eat breakfast. Insulins administer per MD orders. Patient with poor appetite due to food options from cafeteria. Patient relies on boost supplements for meals. MD aware, boost ordered to be delivered with all meals. Patient had only boost supplement shake for breakfast this AM. Patient confused at times, able to be reoriented. Patient with incontinence at times, perineal area excoriated. Patient refuses condom cath for incontinence, states "it's too tight, it hurts". When sizing up for condom cath, next size up is too large. Incontience pads utilized for incontinence. Barrier cream applied. Patient shifts weight independently in bed to off load pressure. Ambulates to bathroom with walker and stand by assistance. Fall precautions maintained, call bell within reach, no needs at this time.     Problem: Adult Inpatient Plan of Care  Goal: Plan of Care Review  Outcome: Ongoing, Progressing  Goal: Patient-Specific Goal (Individualized)  Outcome: Ongoing, Progressing  Goal: Absence of Hospital-Acquired Illness or Injury  Outcome: Ongoing, Progressing  Goal: Optimal Comfort and Wellbeing  Outcome: Ongoing, Progressing  Goal: Readiness for Transition of Care  Outcome: Ongoing, Progressing     Problem: Diabetes Comorbidity  Goal: Blood Glucose Level Within Targeted Range  Outcome: Ongoing, Progressing     Problem: Fluid and Electrolyte Imbalance (Acute Kidney Injury/Impairment)  Goal: Fluid and Electrolyte Balance  Outcome: Ongoing, Progressing     Problem: Oral Intake Inadequate (Acute Kidney Injury/Impairment)  Goal: Optimal Nutrition Intake  Outcome: Ongoing, Progressing     Problem: Renal Function Impairment (Acute " Kidney Injury/Impairment)  Goal: Effective Renal Function  Outcome: Ongoing, Progressing     Problem: Impaired Wound Healing  Goal: Optimal Wound Healing  Outcome: Ongoing, Progressing     Problem: Skin Injury Risk Increased  Goal: Skin Health and Integrity  Outcome: Ongoing, Progressing     Problem: Diabetic Ketoacidosis  Goal: Fluid and Electrolyte Balance with Absence of Ketosis  Outcome: Ongoing, Progressing     Problem: Fall Injury Risk  Goal: Absence of Fall and Fall-Related Injury  Outcome: Ongoing, Progressing

## 2022-04-17 NOTE — PROGRESS NOTES
[facility] - Telemetry Stepdown  Endocrinology  Progress Note    Admit Date: 4/11/2022     76 year old  male with T2DM, HTN, CAD, STEMI, HLD, paroxysmal Afib, CVA, seizures who presents for recurrent DKA.  He presented to [facility] ED on 4/11 with elevated blood glucose.  He was discharged from [facility] on 4/10 after being admitted for DKA on 4/5. Per family he was not feeling well after discharge to home and vomited several times on 4/11. Finger stick at home read High with unreadable blood glucose.  At this time daughter gave him 14 units of insulin and sublingual zofran. Other than malaise and nausea patient was not exhibiting any other signs/symptoms at home.    - In ER BG found to be 1015 with pCO2 6 and anion gap 35.  Hyperkalemic with K 7.0 and some EKG changes when compared to previous EKG.  Given calcium gluconate, and started on insulin gtt as well as subQ long acting insulin     - Endocrinology consulted for management of type 2 diabetes after resolution of DKA    - Spoke with daughter who states patient was discharged on 04/10/2022 with high glucose in the 400s which worsened after getting home and eating dinner; appropriate mealtime and correction insulin were given at that time. However, the next day patient's condition was worsening and they called EMS and his sugar was found to be in the thousands. She expresses concern that he cannot be care for adequately at home any longer and wishes to pursue skilled nursing facility.    Regarding Diabetes Mellitus:    Recurring episodes of DKA  Surgical Procedure and Date: NA  Diabetes diagnosis year: >20 years  Home Diabetes Medications:  During recent SNF was on Aspart 8 TID and glargine 8 units daily with sliding scale  How often checking glucose at home? >4 x day   Diabetes Complications include: Hyperglycemia, Hypoglycemia , and Diabetic peripheral neuropathy   Complicating diabetes co morbidities: History of CVA      Interval HPI:   Overnight events:  No acute  "events though did have hyperglycemic excursion after dinner that persisted into the morning  Eatin%  Nausea: No  Hypoglycemia and intervention: No  Fever: No  TPN and/or TF: No  If yes, type of TF/TPN and rate: NA    BP (!) 144/70 (BP Location: Right arm, Patient Position: Lying)   Pulse 66   Temp 97.8 °F (36.6 °C) (Oral)   Resp 18   Ht 5' 11" (1.803 m)   Wt 81.6 kg (180 lb)   SpO2 98%   BMI 25.10 kg/m²     Labs Reviewed and Include    No results for input(s): GLU, CALCIUM, ALBUMIN, PROT, NA, K, CO2, CL, BUN, CREATININE, ALKPHOS, ALT, AST, BILITOT in the last 24 hours.  Lab Results   Component Value Date    WBC 6.61 2022    HGB 11.5 (L) 2022    HCT 35.7 (L) 2022    MCV 89 2022     2022     No results for input(s): TSH, FREET4 in the last 168 hours.  Lab Results   Component Value Date    HGBA1C 9.7 (H) 2022       Nutritional status:   Body mass index is 25.1 kg/m².  Lab Results   Component Value Date    ALBUMIN 2.4 (L) 2022    ALBUMIN 2.3 (L) 04/15/2022    ALBUMIN 3.0 (L) 2022     No results found for: PREALBUMIN    Estimated Creatinine Clearance: 81.1 mL/min (based on SCr of 0.8 mg/dL).    Accu-Checks  Recent Labs     22  0625 22  0719 22  1126 22  1635 22  2345 22  2354 22  0059 22  0239 22  0422 22  0722   POCTGLUCOSE 191* 216* 241* 296* >500* 483* 500* 456* 310* 325*       Current Medications and/or Treatments Impacting Glycemic Control  Immunotherapy:    Immunosuppressants       None          Steroids:   Hormones (From admission, onward)                Start     Stop Route Frequency Ordered    22 1047  melatonin tablet 6 mg  (Medication Panel)         -- Oral Nightly PRN 04/14/22 0948          Pressors:    Autonomic Drugs (From admission, onward)                None          Hyperglycemia/Diabetes Medications:   Antihyperglycemics (From admission, onward)                Start     " Stop Route Frequency Ordered    22 1130  insulin aspart U-100 pen 10 Units         -- SubQ 3 times daily with meals 22 0757    22 0111  insulin aspart U-100 pen 4 Units         -- SubQ With snacks 22 0012    22 0900  insulin detemir U-100 pen 8 Units         -- SubQ 2 times daily 22 0822    22 0819  insulin aspart U-100 pen 1-10 Units         -- SubQ Every 6 hours PRN 22 0720            ASSESSMENT and PLAN    * Ketosis-prone diabetes mellitus  Key History and Diagnostic Findings  - A1c of 9.7 on 2022 from 11.5 on 2022    - Home Regimen: Levemir 8 units daily, aspart 8 units TIDWM  - Weight based dosin kg x 0.5 = 41 TDD x 0.5 = 20 basal / 20 prandial  - 1700/TDD = 41 (estimated insulin sensitivity factor)  - 450/TDD = 11 (estimated starting carb ratio for prandial dosing)  - Glucose Goals: 160-200mg/dL  - 24 hour glucose trend:  Had adequate control most of yesterday until after dinner with significant hyperglycemic excursion into the 300s that persisted into morning.  He was given 10 units of correctional aspart and breakfast was held until less than 250, then scheduled MDI resumed.    Plan  - Continue levemir 8 units twice daily  - Increase aspart from 8 up to 10 units TIDWM with moderate correction  - basal/prandial (35%/65%)  - Daughter wishes to pursue skilled nursing facility for discharge as she states he cannot be adequately taken care of at home    - Hypoglycemia protocol in place  - If blood glucose greater than 300, please ask patient not to eat food or drink anything other than water until correctional insulin has brought it back below 250  - Please ensure patient receives their p.r.n. insulin aspart if they eat a snack with more than 30 g of carbohydrate    BRENDAN (acute kidney injury)  - Resolved, decreased renal function can cause decreased renal clearance and result in insulin stacking increasing risk of hypoglycemia    Coronary artery  disease  - Strive to optimize glucose control        Yeison Hinton DO  [facility], [address]  [address] [place], [place], [address]    Office: [phone]  Fax: [phone]    Disclaimer: This note has been generated using voice-recognition software. There may be typographical errors that have been missed during proof-reading.    The above history labs imaging impression and plan were discussed with attending physician who is in agreement and also took part in this patient's care.  I personally reviewed all of the patients available medications, labs, imaging, vitals, allergies, medical history.

## 2022-04-18 ENCOUNTER — TELEPHONE (OUTPATIENT)
Dept: FAMILY MEDICINE | Facility: CLINIC | Age: 77
End: 2022-04-18
Payer: MEDICARE

## 2022-04-18 LAB
ALBUMIN SERPL BCP-MCNC: 2.5 G/DL (ref 3.5–5.2)
ALP SERPL-CCNC: 127 U/L (ref 55–135)
ALT SERPL W/O P-5'-P-CCNC: 18 U/L (ref 10–44)
ANION GAP SERPL CALC-SCNC: 9 MMOL/L (ref 8–16)
AST SERPL-CCNC: 20 U/L (ref 10–40)
BASOPHILS # BLD AUTO: 0.05 K/UL (ref 0–0.2)
BASOPHILS NFR BLD: 0.6 % (ref 0–1.9)
BILIRUB SERPL-MCNC: 0.5 MG/DL (ref 0.1–1)
BUN SERPL-MCNC: 14 MG/DL (ref 8–23)
CALCIUM SERPL-MCNC: 8.4 MG/DL (ref 8.7–10.5)
CHLORIDE SERPL-SCNC: 100 MMOL/L (ref 95–110)
CO2 SERPL-SCNC: 25 MMOL/L (ref 23–29)
CREAT SERPL-MCNC: 0.9 MG/DL (ref 0.5–1.4)
DIFFERENTIAL METHOD: ABNORMAL
EOSINOPHIL # BLD AUTO: 0.5 K/UL (ref 0–0.5)
EOSINOPHIL NFR BLD: 6 % (ref 0–8)
ERYTHROCYTE [DISTWIDTH] IN BLOOD BY AUTOMATED COUNT: 15.7 % (ref 11.5–14.5)
EST. GFR  (AFRICAN AMERICAN): >60 ML/MIN/1.73 M^2
EST. GFR  (NON AFRICAN AMERICAN): >60 ML/MIN/1.73 M^2
GLUCOSE SERPL-MCNC: 236 MG/DL (ref 70–110)
HCT VFR BLD AUTO: 37.4 % (ref 40–54)
HGB BLD-MCNC: 12.1 G/DL (ref 14–18)
IMM GRANULOCYTES # BLD AUTO: 0.02 K/UL (ref 0–0.04)
IMM GRANULOCYTES NFR BLD AUTO: 0.3 % (ref 0–0.5)
LYMPHOCYTES # BLD AUTO: 1.7 K/UL (ref 1–4.8)
LYMPHOCYTES NFR BLD: 22.3 % (ref 18–48)
MAGNESIUM SERPL-MCNC: 1.5 MG/DL (ref 1.6–2.6)
MCH RBC QN AUTO: 28.6 PG (ref 27–31)
MCHC RBC AUTO-ENTMCNC: 32.4 G/DL (ref 32–36)
MCV RBC AUTO: 88 FL (ref 82–98)
MONOCYTES # BLD AUTO: 0.7 K/UL (ref 0.3–1)
MONOCYTES NFR BLD: 9.1 % (ref 4–15)
NEUTROPHILS # BLD AUTO: 4.8 K/UL (ref 1.8–7.7)
NEUTROPHILS NFR BLD: 61.7 % (ref 38–73)
NRBC BLD-RTO: 0 /100 WBC
PHOSPHATE SERPL-MCNC: 3.2 MG/DL (ref 2.7–4.5)
PLATELET # BLD AUTO: 227 K/UL (ref 150–450)
PMV BLD AUTO: 9.6 FL (ref 9.2–12.9)
POCT GLUCOSE: 126 MG/DL (ref 70–110)
POCT GLUCOSE: 240 MG/DL (ref 70–110)
POCT GLUCOSE: 255 MG/DL (ref 70–110)
POCT GLUCOSE: 295 MG/DL (ref 70–110)
POCT GLUCOSE: 312 MG/DL (ref 70–110)
POTASSIUM SERPL-SCNC: 4.5 MMOL/L (ref 3.5–5.1)
PROT SERPL-MCNC: 5.7 G/DL (ref 6–8.4)
RBC # BLD AUTO: 4.23 M/UL (ref 4.6–6.2)
SODIUM SERPL-SCNC: 134 MMOL/L (ref 136–145)
WBC # BLD AUTO: 7.8 K/UL (ref 3.9–12.7)

## 2022-04-18 PROCEDURE — 99232 SBSQ HOSP IP/OBS MODERATE 35: CPT | Mod: ,,, | Performed by: INTERNAL MEDICINE

## 2022-04-18 PROCEDURE — 20600001 HC STEP DOWN PRIVATE ROOM

## 2022-04-18 PROCEDURE — 97535 SELF CARE MNGMENT TRAINING: CPT

## 2022-04-18 PROCEDURE — 25000003 PHARM REV CODE 250: Performed by: INTERNAL MEDICINE

## 2022-04-18 PROCEDURE — 83735 ASSAY OF MAGNESIUM: CPT | Performed by: INTERNAL MEDICINE

## 2022-04-18 PROCEDURE — 63600175 PHARM REV CODE 636 W HCPCS: Performed by: INTERNAL MEDICINE

## 2022-04-18 PROCEDURE — 99232 SBSQ HOSP IP/OBS MODERATE 35: CPT | Mod: GC,,, | Performed by: INTERNAL MEDICINE

## 2022-04-18 PROCEDURE — 84100 ASSAY OF PHOSPHORUS: CPT | Performed by: INTERNAL MEDICINE

## 2022-04-18 PROCEDURE — 99232 PR SUBSEQUENT HOSPITAL CARE,LEVL II: ICD-10-PCS | Mod: GC,,, | Performed by: INTERNAL MEDICINE

## 2022-04-18 PROCEDURE — 36415 COLL VENOUS BLD VENIPUNCTURE: CPT | Performed by: INTERNAL MEDICINE

## 2022-04-18 PROCEDURE — 94761 N-INVAS EAR/PLS OXIMETRY MLT: CPT

## 2022-04-18 PROCEDURE — 99232 PR SUBSEQUENT HOSPITAL CARE,LEVL II: ICD-10-PCS | Mod: ,,, | Performed by: INTERNAL MEDICINE

## 2022-04-18 PROCEDURE — 97530 THERAPEUTIC ACTIVITIES: CPT

## 2022-04-18 PROCEDURE — 85025 COMPLETE CBC W/AUTO DIFF WBC: CPT | Performed by: INTERNAL MEDICINE

## 2022-04-18 PROCEDURE — 97116 GAIT TRAINING THERAPY: CPT

## 2022-04-18 PROCEDURE — 80053 COMPREHEN METABOLIC PANEL: CPT | Performed by: INTERNAL MEDICINE

## 2022-04-18 RX ORDER — MAGNESIUM SULFATE HEPTAHYDRATE 40 MG/ML
2 INJECTION, SOLUTION INTRAVENOUS ONCE
Status: COMPLETED | OUTPATIENT
Start: 2022-04-18 | End: 2022-04-18

## 2022-04-18 RX ORDER — TAMSULOSIN HYDROCHLORIDE 0.4 MG/1
0.4 CAPSULE ORAL DAILY
Status: DISCONTINUED | OUTPATIENT
Start: 2022-04-18 | End: 2022-05-03 | Stop reason: HOSPADM

## 2022-04-18 RX ADMIN — CLOPIDOGREL 75 MG: 75 TABLET, FILM COATED ORAL at 09:04

## 2022-04-18 RX ADMIN — LACOSAMIDE 100 MG: 100 TABLET, FILM COATED ORAL at 09:04

## 2022-04-18 RX ADMIN — AMIODARONE HYDROCHLORIDE 200 MG: 200 TABLET ORAL at 09:04

## 2022-04-18 RX ADMIN — APIXABAN 5 MG: 5 TABLET, FILM COATED ORAL at 09:04

## 2022-04-18 RX ADMIN — METOPROLOL SUCCINATE 50 MG: 50 TABLET, EXTENDED RELEASE ORAL at 09:04

## 2022-04-18 RX ADMIN — INSULIN ASPART 8 UNITS: 100 INJECTION, SOLUTION INTRAVENOUS; SUBCUTANEOUS at 09:04

## 2022-04-18 RX ADMIN — INSULIN ASPART 10 UNITS: 100 INJECTION, SOLUTION INTRAVENOUS; SUBCUTANEOUS at 06:04

## 2022-04-18 RX ADMIN — ONDANSETRON 8 MG: 2 INJECTION INTRAMUSCULAR; INTRAVENOUS at 12:04

## 2022-04-18 RX ADMIN — MAGNESIUM SULFATE 2 G: 2 INJECTION INTRAVENOUS at 10:04

## 2022-04-18 RX ADMIN — APIXABAN 5 MG: 5 TABLET, FILM COATED ORAL at 10:04

## 2022-04-18 RX ADMIN — PANTOPRAZOLE SODIUM 40 MG: 40 TABLET, DELAYED RELEASE ORAL at 09:04

## 2022-04-18 RX ADMIN — INSULIN ASPART 10 UNITS: 100 INJECTION, SOLUTION INTRAVENOUS; SUBCUTANEOUS at 09:04

## 2022-04-18 RX ADMIN — SENNOSIDES AND DOCUSATE SODIUM 1 TABLET: 50; 8.6 TABLET ORAL at 10:04

## 2022-04-18 RX ADMIN — ATORVASTATIN CALCIUM 40 MG: 20 TABLET, FILM COATED ORAL at 09:04

## 2022-04-18 RX ADMIN — INSULIN ASPART 10 UNITS: 100 INJECTION, SOLUTION INTRAVENOUS; SUBCUTANEOUS at 01:04

## 2022-04-18 RX ADMIN — INSULIN ASPART 4 UNITS: 100 INJECTION, SOLUTION INTRAVENOUS; SUBCUTANEOUS at 06:04

## 2022-04-18 RX ADMIN — LACOSAMIDE 100 MG: 100 TABLET, FILM COATED ORAL at 10:04

## 2022-04-18 RX ADMIN — INSULIN ASPART 6 UNITS: 100 INJECTION, SOLUTION INTRAVENOUS; SUBCUTANEOUS at 01:04

## 2022-04-18 NOTE — NURSING
2000  Received report for pt from AM Nurse. Pt resting in bed. AAOx4; RA. VSS. No c/o pain/distress. Education provided regarding safety and fall precaution. Safety check performed. Call bell within reach. Will continue to monitor.    0000  Pt resting in bed. Medication administered per MAR. Safety check performed. Call bell within reach. Will continue to monitor.

## 2022-04-18 NOTE — PROGRESS NOTES
[facility] - Telemetry [facility] Medicine  Progress Note    Patient Name: Kamar Muñoz  MRN: [id number]  Patient Class: IP- Inpatient   Admission Date: 4/11/2022  Length of Stay: 7 days  Attending Physician: Siva Hassan MD  Primary Care Provider: Basim Guerrero MD        Subjective:     Principal Problem:Diabetic ketoacidosis without coma associated with type 2 diabetes mellitus        HPI:  Mr. Kamar Muñoz is a 76 y.o. male with a past medical history of HTN, Type 2 DM, CAD, STEMI, HLD, paroxysmal Afib, CVA, seizures and recurrent DKA.  He presented to [facility] ED on 4/11 with elevated blood glucose.  He was discharged from [facility] on 4/10 after being admitted for DKA on 4/5.  At time of exam patient is alert but altered and unable to provide any history.  Spoke with patient's daughter who states she is a primary caregiver at home and obtained history as well as review of chart.  Per family he was not feeling well after discharge to home and vomited several times on 4/11. Unknown characteristics of emesis. Finger stick at home read High with unreadable blood glucose.  At this time daughter gave him 14 units of insulin and sublingual zofran. Other than malaise and nausea patient was not exhibiting any other signs/symptoms at home.  In ER BG found to be 1015 with pCO2 6 and anion gap 35.  Hyperkalemic with K 7.0 and some EKG changes when compared to previous EKG.  Given calcium gluconate, and started on insulin gtt as well as subQ long acting insulin.      Critical Care Medicine consulted for DKA and admitted to MICU.        Overview/Hospital Course:  Admitted to MICU on 4/11 for DKA with BG >1000, pCO2 6, AG 35, and hyperkalemia.  Given Calcium gluconate and started on insulin gtt in ED.  Hyperkalemia not improved on repeat labs and bolused with IV insulin.  Infectious workup sent and broad spectrum abx started.  4/12 potassium improving with insulin. 4/13 Gap closed, insulin gtt turned off and  switched to subq insulin. Endocrine consulted for insulin mgmt. He was stepped down to  4/14.    Since step down stable. Endocrine following and titrating insulin. Advanced diet. PT OT ordered for safe dispo plan rec SNF.      Interval History: No issues this morning. Glucose labile. Dietary issues today, updated charge and RN. Hes on DM diet and DM precautions , and is aware what and what not to eat. Continues to have weakness. Aox4. Endocrine following, titrating insulin. No complaints at this time. Planning SNF on discharge. Tried calling son but busy signal, and voicemail full, will try again later.    Review of Systems   Constitutional:  Positive for appetite change and weakness. Negative for fever.   HENT:  Negative for rhinorrhea and sore throat.    Respiratory:  Negative for cough and shortness of breath.    Cardiovascular:  Negative for chest pain and palpitations.   Gastrointestinal:  Negative for pain, N/V diarrhea.   Genitourinary:  Negative for difficulty urinating and hematuria.   Musculoskeletal:  Negative for back pain and neck pain.   Skin:  Negative for rash and wound.   Neurological:  Negative for tremors and headaches.   All other systems reviewed and are negative.    Objective:     Vital Signs (Most Recent):  Temp: 96.1 °F (35.6 °C) (04/18/22 0741)  Pulse: 65 (04/18/22 1045)  Resp: 18 (04/18/22 1045)  BP: 131/74 (04/18/22 0741)  SpO2: 95 % (04/18/22 1045)   Vital Signs (24h Range):  Temp:  [96.1 °F (35.6 °C)-98.5 °F (36.9 °C)] 96.1 °F (35.6 °C)  Pulse:  [56-65] 65  Resp:  [18-20] 18  SpO2:  [92 %-96 %] 95 %  BP: (131-145)/(68-75) 131/74     Weight: 81.6 kg (180 lb)  Body mass index is 25.1 kg/m².    Intake/Output Summary (Last 24 hours) at 4/18/2022 1159  Last data filed at 4/18/2022 0230  Gross per 24 hour   Intake 540 ml   Output 650 ml   Net -110 ml        Physical Exam  Vitals and nursing note reviewed.   Constitutional:       General: He is not in acute distress.     Appearance: Normal  appearance. He is normal weight. He is not ill-appearing, toxic-appearing or diaphoretic.   HENT:      Head: Normocephalic and atraumatic.      Right Ear: External ear normal.      Left Ear: External ear normal.      Nose: Nose normal.   Cardiovascular:      Rate and Rhythm: Normal rate and regular rhythm.      Pulses: Normal pulses.      Heart sounds: Normal heart sounds. No murmur heard.    No friction rub. No gallop.   Pulmonary:      Effort: Pulmonary effort is normal. No respiratory distress.      Breath sounds: Normal breath sounds. No wheezing, rhonchi or rales.   Abdominal:      General: Abdomen is flat. Bowel sounds are normal. There is no distension.      Palpations: Abdomen is soft.      Tenderness: There is no abdominal tenderness. There is no guarding or rebound.   Musculoskeletal:         General: No swelling. Normal range of motion.   Skin:     General: Skin is warm and dry.      Coloration: Skin is not jaundiced.   Neurological:      General: No focal deficit present.      Mental Status: He is alert and oriented to person, place, and time. Mental status is at baseline.   Psychiatric:         Mood and Affect: Mood normal.         Behavior: Behavior normal.         Thought Content: Thought content normal.         Judgment: Judgment normal.     Significant Labs: All pertinent labs within the past 24 hours have been reviewed.    Significant Imaging: I have reviewed all pertinent imaging results/findings within the past 24 hours.      Assessment/Plan:      * Diabetic ketoacidosis without coma associated with type 2 diabetes mellitus  - Resolved  - Unclear inciting etiology missed insulin doses vs sepsis.    - Recent admission 4/5 for DKA [place] [facility] [place]  Per family malaise since discharge with vomiting starting 4/10.  BG at home greater unreadable.  Given 14 units subQ insulin at that time.  In ER BG found to be 1015 with pCO2 6 and anion gap 35 and hyperkalemia.  Started on insulin gtt in  ED and given 20 units subQ long acting insulin.    - Started on DKA protocol in the ICU, now on subq insulin  - Endocrine consulted, adjusting insulin  - Advanced diet   - ACHS, hypoglycemia protocol , SSI    Lactic acidosis  - LA 10.3 on hospital admission.  Concern for infectious process given WBC on admission 17.05, on 4/10 prior to discharge WBC 6.82. CXR with bilateral interstitial pattern, no focal areas of consolidation.    - Procalcitonin 1.18  - Blood cultures NGTD  - Antibiotics discontinued in the ICU  - Low suspicion for any infectious etiology , likely from DKA       Goals of care, counseling/discussion  - DNR as per ICU    Ketosis-prone diabetes mellitus  - see DKA      Focal seizures  - History of seizures on lacosamide.    - Continue home lacosamide    Coronary artery disease  - Stable  - Continue home regimen of aspirin, atorvastatin, clopidogrel, losartan, and metoprolol      Paroxysmal atrial fibrillation  - History of afib.    - Continue home regimen of amiodarone, apixaban, and metoprolol        BRENDAN (acute kidney injury)  - Resolved  - Likely secondary to dehydration in the setting of DKA.  Baseline Cr per chart review ~1.0.  Cr on admission 2.3.     Essential hypertension  - resume home meds as able        VTE Risk Mitigation (From admission, onward)         Ordered     apixaban tablet 5 mg  2 times daily         04/12/22 0142     IP VTE HIGH RISK PATIENT  Once         04/11/22 2342     Place sequential compression device  Until discontinued         04/11/22 2138                Discharge Planning   ALLIE: 4/20/2022     Code Status: DNR   Is the patient medically ready for discharge?: Yes    Reason for patient still in hospital (select all that apply): Pending disposition  Discharge Plan A: Skilled Nursing Facility   Discharge Delays: None known at this time              Siva Hassan MD  Department of Hospital Medicine   [facility] - Telemetry Stepdown

## 2022-04-18 NOTE — PT/OT/SLP PROGRESS
Physical Therapy Treatment    Patient Name:  Kamar Muñoz   MRN:  [id number]    Recommendations:     Discharge Recommendations:  nursing facility, skilled   Discharge Equipment Recommendations: other (see comments) (tbd)   Barriers to discharge: requires 24/7 supervision    Assessment:     Kamar Muñoz is a 76 y.o. male admitted with a medical diagnosis of Diabetic ketoacidosis without coma associated with type 2 diabetes mellitus.  He presents with the following impairments/functional limitations:  weakness, impaired endurance, impaired self care skills, impaired functional mobilty, gait instability, impaired balance, impaired cardiopulmonary response to activity, decreased lower extremity function. Kamar Muñoz would benefit from acute PT intervention to address listed functional deficits, provide patient/caregiver education, reduce fall risk, and maximize (I) and safety with functional mobility.    Pt displayed limited overall tolerance for therapy on this date. Pt did well to complete tasks in the session, but did display some self-limiting behaviors. Pt is functioning below baseline and requiring increased overall assist, displaying global deconditioning and requiring increased assist. Pt able to ambulate ~20 ft with RW and CGA, but fatigued quickly. Pt will continue to benefit from acute PT services in order to maximize safety and (I) with functional mobility.     After hospital discharge, pt would benefit from SNF to continue addressing therapy impairments.    Rehab Prognosis: Good; patient would benefit from acute skilled PT services to address these deficits and reach maximum level of function.      Plan:     During this hospitalization, patient to be seen 4 x/week to address the identified rehab impairments via gait training, therapeutic activities, therapeutic exercises, neuromuscular re-education and progress toward the following goals:    · Plan of Care Expires:  05/15/22    This plan of  care has been discussed with the patient/caregiver, who was included in its development and is in agreement with the identified goals and treatment plan.     Subjective     Communicated with RN prior to session.  Patient agreeable to participate.     Chief Complaint: FAtigue  Patient/Family Comments/goals: to get better so that I can go home  Pt pain prior to session: 0/10  Pt pain following session: 0/10    Objective:     Patient found supine with telemetry  upon PT entry to room.    General Precautions: Standard, fall, seizure   Orthopedic Precautions:N/A   Braces: N/A     Functional Mobility:    Bed Mobility:  · Supine to Sit: Stand-by Assistance  · Sit to Supine: Stand-by Assistance  · Scooting anteriorly to EOB to plant feet on floor: Stand-by Assistance  · Scooting/Bridging in supine to HOB: Modified Independent    Transfers:   · Sit to Stand Transfer: Minimal Assistance  from EOB with RW AD   · Also performed from toilet with grab bar and RW with CGA             Gait:  · Patient received gait training ~20 feet with Contact Guard Assistance and rolling walker  · Gait Assessment: decreased speed, step length, swing through, heel strike, forward flexed posture, and downward gaze.   · PT providing verbal and tactile cues for improved upright posture, gaze direction, AD management, and gait fluidity.     Balance:  · Static Sit: Stand-By Assist at EOB x ~5 minutes  · Static Stand: Contact-Guard Assist with Rolling walker      Therapeutic Activities/Exercises     Patient educated on the importance of early mobility to prevent functional decline during hospital stay    Patient was instructed to utilize staff assistance for mobility/transfers.  Patient was educated on PT POC and all questions answered within PT scope of practice.    Patient able to verbalize understanding; will follow-up with pt during current admit for additional questions/concerns within scope of practice.      AM-PAC 6 CLICK MOBILITY  Total  Score:17     Patient left HOB elevated with all lines intact and call button in reach.        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    Hyperlipidemia     Hypertension     Kidney stone     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 8/2/2012    STEMI involving right coronary artery 1/9/2022    Type II or unspecified type diabetes mellitus with neurological manifestations, uncontrolled(250.62) 3/8/2013    Urinary tract infection        Past Surgical History:   Procedure Laterality Date    BACK SURGERY      CATARACT EXTRACTION W/  INTRAOCULAR LENS IMPLANT Right     Per Dr Romero note 11/2018    COLONOSCOPY N/A 1/28/2019    Procedure: COLONOSCOPY Suprep;  Surgeon: Anh Johnson MD;  Location[facility] ENDO;  Service: Endoscopy;  Laterality: N/A;    EYE SURGERY      HERNIA REPAIR      LEFT HEART CATHETERIZATION Left 1/9/2022    Procedure: CATHETERIZATION, HEART, LEFT;  Surgeon: Will Hurst III, MD;  Location: [facility] CATH LAB/EP;  Service: Cardiology;  Laterality: Left;    renal stones      SHOULDER OPEN ROTATOR CUFF REPAIR         GOALS:   Multidisciplinary Problems     Physical Therapy Goals        Problem: Physical Therapy    Goal Priority Disciplines Outcome Goal Variances Interventions   Physical Therapy Goal     PT, PT/OT Ongoing, Progressing     Description: Goals to met by 4/29/2022     1. Supine to sit with Modified [place]  2. Sit to supine with Modified [place]  3. Sit to stand transfer with Modified [place]  4. Bed to chair transfer with Modified [place] using Rolling Walker  5. Gait  x 150 feet with Stand-by Assistance using Rolling Walker   6. Ascend/Descend 6 inch curb step with Contact Guard Assistance using LRAD.  7. Lower extremity exercise program x15  reps per Instruction, with supervision in order to facilitate improvement in functional independence                     Time Tracking:     PT Received On: 04/18/22  PT Start Time: 1505     PT Stop Time: 1530  PT Total Time (min): 25 min     Billable Minutes: Gait Training 10 and Therapeutic Activity 15      Hiram Stevenson, PT  04/18/2022

## 2022-04-18 NOTE — PT/OT/SLP PROGRESS
Occupational Therapy   Treatment    Name: Kamar Muñoz  MRN: [id number]  Admitting Diagnosis:  Diabetic ketoacidosis without coma associated with type 2 diabetes mellitus       Recommendations:     Discharge Recommendations: nursing facility, skilled  Discharge Equipment Recommendations:   (tbd)  Barriers to discharge:  Decreased caregiver support    Assessment:     Kamar Muñoz is a 76 y.o. male with a medical diagnosis of Diabetic ketoacidosis without coma associated with type 2 diabetes mellitus.  He presents with impairments listed below. Pt displayed limited overall tolerance for therapy on this date. Pt did well to complete tasks in the session, but did display some self-limiting behaviors. Pt is functioning below baseline and requiring increased overall assist at this time. Pt displayed global deconditioning requiring increased assist for ADLs and mobility at this time. Pt would benefit from skilled OT services to improve independence and overall occupational functioning.     Performance deficits affecting function are weakness, impaired endurance, impaired self care skills, impaired functional mobilty, gait instability, impaired balance, decreased lower extremity function.     Rehab Prognosis:  Good; patient would benefit from acute skilled OT services to address these deficits and reach maximum level of function.       Plan:     Patient to be seen 4 x/week to address the above listed problems via self-care/home management, therapeutic activities, therapeutic exercises, neuromuscular re-education  · Plan of Care Expires: 05/15/22  · Plan of Care Reviewed with: patient    Subjective     Pain/Comfort:  · Pain Rating 1: 0/10  · Pain Rating Post-Intervention 1: 0/10    Objective:     Communicated with: RN prior to session.  Patient found HOB elevated with telemetry upon OT entry to room.    General Precautions: Standard, fall, seizure   Orthopedic Precautions:N/A   Braces: N/A  Respiratory Status: Room  air     Occupational Performance:     Bed Mobility:    · Patient completed Scooting/Bridging with stand by assistance  · Patient completed Supine to Sit with stand by assistance  · Patient completed Sit to Supine with stand by assistance     Functional Mobility/Transfers:  · Pt deferred to ambulated to the bathroom to perform ADLs.    Activities of Daily Living:  · Grooming: stand by assistance oral and facial hygiene while seated EOB.       [facility] 6 Click ADL: 17    Treatment & Education:  Pt educated on:     POC & overall coordination of care    D/C recs   Early mobility   Importance of oob activities   Importance of participating in therapy   Possible benefits of therapy      Patient left HOB elevated with all lines intact and call button in reachEducation:      GOALS:   Multidisciplinary Problems     Occupational Therapy Goals        Problem: Occupational Therapy    Goal Priority Disciplines Outcome Interventions   Occupational Therapy Goal     OT, PT/OT Ongoing, Progressing    Description: Goals to be met by: 4/29/2022     Patient will increase functional independence with ADLs by performing:    UE Dressing with Set-up Assistance.  LE Dressing with Minimal Assistance using AD PRN.  Grooming while standing at sink with Stand-by Assistance.  Toileting from toilet with Supervision for hygiene and clothing management.   Step transfer with Supervision using AD PRN.  Toilet transfer to toilet with Stand-by Assistance using AD PRN.                     Time Tracking:     OT Date of Treatment: 04/18/22  OT Start Time: 1313  OT Stop Time: 1344  OT Total Time (min): 31 min    Billable Minutes:Self Care/Home Management 31 minutes    OT/JUAN: OT          4/18/2022

## 2022-04-18 NOTE — ASSESSMENT & PLAN NOTE
- Resolved  - Unclear inciting etiology missed insulin doses vs sepsis.    - Recent admission 4/5 for DKA and [place] [facility] [place]  Per family malaise since discharge with vomiting starting 4/10.  BG at home greater unreadable.  Given 14 units subQ insulin at that time.  In ER BG found to be 1015 with pCO2 6 and anion gap 35 and hyperkalemia.  Started on insulin gtt in ED and given 20 units subQ long acting insulin.    - Started on DKA protocol in the ICU, now on subq insulin  - Endocrine consulted, adjusting insulin  - Advanced diet   - ACHS, hypoglycemia protocol , SSI

## 2022-04-18 NOTE — PLAN OF CARE
[facility] - Telemetry Stepdown  Discharge Reassessment    Primary Care Provider: Basim Guerrero MD    Expected Discharge Date: 4/20/2022    Reassessment (most recent)     Discharge Reassessment - 04/18/22 1114        Discharge Reassessment    Assessment Type Discharge Planning Reassessment     Discharge Plan discussed with: Patient;Adult children     Communicated ALLIE with patient/caregiver Yes     Discharge Plan A Skilled Nursing Facility     Discharge Plan B Home Health     Discharge Barriers Identified None     Why the patient remains in the hospital Requires continued medical care        Post-Acute Status    Post-Acute Authorization Placement     Post-Acute Placement Status Patient List Provided     Discharge Delays None known at this time               Met with patient at bedside and discussed discharge planning and PT/OT/MD recommendations for SNF. Patient is agreeable to OSNF only at this time, does not want a nursing home SNF. Patient handed SW the room phone to speak with his son, Kamar Muñoz Jr. [phone], who requested SW call him back today to discuss patient's care. Patient provided consent for SW to call his son to discuss plan of care and discharge planning.    Contacted patient's son who verbalized complaints regarding patient receiving inappropriate meals from the cafeteria causing patient's blood sugar to become high/unstable. Kamar would like this issue escalated to administration/management.    Brought above issue to Case Management Supervisors who advised that the unit charge nurse will need to be informed and speak with son to address the situation. JASIEL updated charge nurse and MD.    11:59 AM  Spoke with patient's son who reports he spoke with charge nurse. Emailed patient's son JASIEL's contact information and patient advocacy number.    Bess Mathews LMSW  [facility]  Ext. [phone]

## 2022-04-18 NOTE — PROGRESS NOTES
[facility] - Telemetry Stepdown  Endocrinology  Progress Note    Admit Date: 4/11/2022     76 year old  male with T2DM, HTN, CAD, STEMI, HLD, paroxysmal Afib, CVA, seizures who presents for recurrent DKA.  He presented to [facility] ED on 4/11 with elevated blood glucose.  He was discharged from [facility] on 4/10 after being admitted for DKA on 4/5. Per family he was not feeling well after discharge to home and vomited several times on 4/11. Finger stick at home read High with unreadable blood glucose.  At this time daughter gave him 14 units of insulin and sublingual zofran. Other than malaise and nausea patient was not exhibiting any other signs/symptoms at home.    - In ER BG found to be 1015 with pCO2 6 and anion gap 35.  Hyperkalemic with K 7.0 and some EKG changes when compared to previous EKG.  Given calcium gluconate, and started on insulin gtt as well as subQ long acting insulin     - Endocrinology consulted for management of type 2 diabetes after resolution of DKA    - Spoke with daughter who states patient was discharged on 04/10/2022 with high glucose in the 400s which worsened after getting home and eating dinner; appropriate mealtime and correction insulin were given at that time. However, the next day patient's condition was worsening and they called EMS and his sugar was found to be in the thousands. She expresses concern that he cannot be care for adequately at home any longer and wishes to pursue skilled nursing facility.    Regarding Diabetes Mellitus:    Recurring episodes of DKA  Surgical Procedure and Date: NA  Diabetes diagnosis year: >20 years  Home Diabetes Medications:  During recent SNF was on Aspart 8 TID and glargine 8 units daily with sliding scale  How often checking glucose at home? >4 x day   Diabetes Complications include: Hyperglycemia, Hypoglycemia , and Diabetic peripheral neuropathy   Complicating diabetes co morbidities: History of CVA      Interval HPI:   Overnight events: blood  "glucose elevated this morning      /74 (BP Location: Right arm, Patient Position: Lying)   Pulse 63   Temp 96.1 °F (35.6 °C) (Oral)   Resp 18   Ht 5' 11" (1.803 m)   Wt 81.6 kg (180 lb)   SpO2 96%   BMI 25.10 kg/m²     Labs Reviewed and Include    Recent Labs   Lab 04/18/22  0605   *   CALCIUM 8.4*   ALBUMIN 2.5*   PROT 5.7*   *   K 4.5   CO2 25      BUN 14   CREATININE 0.9   ALKPHOS 127   ALT 18   AST 20   BILITOT 0.5     Lab Results   Component Value Date    WBC 7.80 04/18/2022    HGB 12.1 (L) 04/18/2022    HCT 37.4 (L) 04/18/2022    MCV 88 04/18/2022     04/18/2022     No results for input(s): TSH, FREET4 in the last 168 hours.  Lab Results   Component Value Date    HGBA1C 9.7 (H) 04/05/2022       Nutritional status:   Body mass index is 25.1 kg/m².  Lab Results   Component Value Date    ALBUMIN 2.5 (L) 04/18/2022    ALBUMIN 2.4 (L) 04/17/2022    ALBUMIN 2.4 (L) 04/16/2022     No results found for: PREALBUMIN    Estimated Creatinine Clearance: 72 mL/min (based on SCr of 0.9 mg/dL).    Accu-Checks  Recent Labs     04/17/22  0059 04/17/22  0239 04/17/22  0422 04/17/22  0722 04/17/22  0858 04/17/22  0933 04/17/22  1145 04/17/22  1641 04/17/22  2110 04/18/22  0743   POCTGLUCOSE 500* 456* 310* 325* 326* 261* 231* 154* 89 312*       Current Medications and/or Treatments Impacting Glycemic Control  Immunotherapy:    Immunosuppressants       None          Steroids:   Hormones (From admission, onward)                Start     Stop Route Frequency Ordered    04/14/22 1047  melatonin tablet 6 mg  (Medication Panel)         -- Oral Nightly PRN 04/14/22 0948          Pressors:    Autonomic Drugs (From admission, onward)                None          Hyperglycemia/Diabetes Medications:   Antihyperglycemics (From admission, onward)                Start     Stop Route Frequency Ordered    04/18/22 2100  insulin detemir U-100 pen 10 Units         -- SubQ 2 times daily 04/18/22 1055    " 22 1130  insulin aspart U-100 pen 10 Units         -- SubQ 3 times daily with meals 22 0757    22 0111  insulin aspart U-100 pen 4 Units         -- SubQ With snacks 22 0012    22 0819  insulin aspart U-100 pen 1-10 Units         -- SubQ Every 6 hours PRN 22 0720            ASSESSMENT and PLAN    * Diabetic ketoacidosis without coma associated with type 2 diabetes mellitus  No longer in DKA    Ketosis-prone diabetes mellitus  Key History and Diagnostic Findings  - A1c of 9.7 on 2022 from 11.5 on 2022  - Home Regimen: Levemir 8 units daily, aspart 8 units TIDWM  - Weight based dosin kg x 0.5 = 41 TDD x 0.5 = 20 basal / 20 prandial  - 1700/TDD = 41 (estimated insulin sensitivity factor)  - 450/TDD = 11 (estimated starting carb ratio for prandial dosing)  - Glucose Goals: 160-200mg/dL  - 24 hour glucose trend: elevated this morning, rest was better controlled from day before    Plan  - increase levemir to 10 units twice daily  - on aspart from 8 up to 10 units TIDWM with moderate correction  - Hypoglycemia protocol in place  - If blood glucose greater than 300, please ask patient not to eat food or drink anything other than water until correctional insulin has brought it back below 250  - Please ensure patient receives their p.r.n. insulin aspart if they eat a snack with more than 30 g of carbohydrate  - Daughter wishes to pursue skilled nursing facility for discharge as she states he cannot be adequately taken care of at home    Coronary artery disease  - Strive to optimize glucose control    BRENDAN (acute kidney injury)  - Resolved, decreased renal function can cause decreased renal clearance and result in insulin stacking increasing risk of hypoglycemia        Modesto Abraham MD  Endocrinology  [facility]

## 2022-04-18 NOTE — SUBJECTIVE & OBJECTIVE
"Interval HPI:   Overnight events: blood glucose elevated this morning      /74 (BP Location: Right arm, Patient Position: Lying)   Pulse 63   Temp 96.1 °F (35.6 °C) (Oral)   Resp 18   Ht 5' 11" (1.803 m)   Wt 81.6 kg (180 lb)   SpO2 96%   BMI 25.10 kg/m²     Labs Reviewed and Include    Recent Labs   Lab 04/18/22  0605   *   CALCIUM 8.4*   ALBUMIN 2.5*   PROT 5.7*   *   K 4.5   CO2 25      BUN 14   CREATININE 0.9   ALKPHOS 127   ALT 18   AST 20   BILITOT 0.5     Lab Results   Component Value Date    WBC 7.80 04/18/2022    HGB 12.1 (L) 04/18/2022    HCT 37.4 (L) 04/18/2022    MCV 88 04/18/2022     04/18/2022     No results for input(s): TSH, FREET4 in the last 168 hours.  Lab Results   Component Value Date    HGBA1C 9.7 (H) 04/05/2022       Nutritional status:   Body mass index is 25.1 kg/m².  Lab Results   Component Value Date    ALBUMIN 2.5 (L) 04/18/2022    ALBUMIN 2.4 (L) 04/17/2022    ALBUMIN 2.4 (L) 04/16/2022     No results found for: PREALBUMIN    Estimated Creatinine Clearance: 72 mL/min (based on SCr of 0.9 mg/dL).    Accu-Checks  Recent Labs     04/17/22  0059 04/17/22  0239 04/17/22  0422 04/17/22  0722 04/17/22  0858 04/17/22  0933 04/17/22  1145 04/17/22  1641 04/17/22  2110 04/18/22  0743   POCTGLUCOSE 500* 456* 310* 325* 326* 261* 231* 154* 89 312*       Current Medications and/or Treatments Impacting Glycemic Control  Immunotherapy:    Immunosuppressants       None          Steroids:   Hormones (From admission, onward)                Start     Stop Route Frequency Ordered    04/14/22 1047  melatonin tablet 6 mg  (Medication Panel)         -- Oral Nightly PRN 04/14/22 0948          Pressors:    Autonomic Drugs (From admission, onward)                None          Hyperglycemia/Diabetes Medications:   Antihyperglycemics (From admission, onward)                Start     Stop Route Frequency Ordered    04/18/22 2100  insulin detemir U-100 pen 10 Units         -- " SubQ 2 times daily 04/18/22 1055    04/17/22 1130  insulin aspart U-100 pen 10 Units         -- SubQ 3 times daily with meals 04/17/22 0757    04/17/22 0111  insulin aspart U-100 pen 4 Units         -- SubQ With snacks 04/17/22 0012    04/13/22 0819  insulin aspart U-100 pen 1-10 Units         -- SubQ Every 6 hours PRN 04/13/22 0720

## 2022-04-18 NOTE — ASSESSMENT & PLAN NOTE
Key History and Diagnostic Findings  - A1c of 9.7 on 2022 from 11.5 on 2022  - Home Regimen: Levemir 8 units daily, aspart 8 units TIDWM  - Weight based dosin kg x 0.5 = 41 TDD x 0.5 = 20 basal / 20 prandial  - 1700/TDD = 41 (estimated insulin sensitivity factor)  - 450/TDD = 11 (estimated starting carb ratio for prandial dosing)  - Glucose Goals: 160-200mg/dL  - 24 hour glucose trend: elevated this morning, rest was better controlled from day before    Plan  - increase levemir to 10 units twice daily  - on aspart from 8 up to 10 units TIDWM with moderate correction  - Hypoglycemia protocol in place  - If blood glucose greater than 300, please ask patient not to eat food or drink anything other than water until correctional insulin has brought it back below 250  - Please ensure patient receives their p.r.n. insulin aspart if they eat a snack with more than 30 g of carbohydrate  - Daughter wishes to pursue skilled nursing facility for discharge as she states he cannot be adequately taken care of at home

## 2022-04-18 NOTE — SUBJECTIVE & OBJECTIVE
Interval History: No issues this morning. Glucose labile. Dietary issues today, updated charge and RN. Hes on DM diet and DM precautions , and is aware what and what not to eat. Continues to have weakness. Aox4. Endocrine following, titrating insulin. No complaints at this time. Planning SNF on discharge. Tried calling son but busy signal, and voicemail full, will try again later.    Review of Systems   Constitutional:  Positive for appetite change and weakness. Negative for fever.   HENT:  Negative for rhinorrhea and sore throat.    Respiratory:  Negative for cough and shortness of breath.    Cardiovascular:  Negative for chest pain and palpitations.   Gastrointestinal:  Negative for pain, N/V diarrhea.   Genitourinary:  Negative for difficulty urinating and hematuria.   Musculoskeletal:  Negative for back pain and neck pain.   Skin:  Negative for rash and wound.   Neurological:  Negative for tremors and headaches.   All other systems reviewed and are negative.    Objective:     Vital Signs (Most Recent):  Temp: 96.1 °F (35.6 °C) (04/18/22 0741)  Pulse: 65 (04/18/22 1045)  Resp: 18 (04/18/22 1045)  BP: 131/74 (04/18/22 0741)  SpO2: 95 % (04/18/22 1045)   Vital Signs (24h Range):  Temp:  [96.1 °F (35.6 °C)-98.5 °F (36.9 °C)] 96.1 °F (35.6 °C)  Pulse:  [56-65] 65  Resp:  [18-20] 18  SpO2:  [92 %-96 %] 95 %  BP: (131-145)/(68-75) 131/74     Weight: 81.6 kg (180 lb)  Body mass index is 25.1 kg/m².    Intake/Output Summary (Last 24 hours) at 4/18/2022 1159  Last data filed at 4/18/2022 0230  Gross per 24 hour   Intake 540 ml   Output 650 ml   Net -110 ml        Physical Exam  Vitals and nursing note reviewed.   Constitutional:       General: He is not in acute distress.     Appearance: Normal appearance. He is normal weight. He is not ill-appearing, toxic-appearing or diaphoretic.   HENT:      Head: Normocephalic and atraumatic.      Right Ear: External ear normal.      Left Ear: External ear normal.      Nose: Nose  normal.   Cardiovascular:      Rate and Rhythm: Normal rate and regular rhythm.      Pulses: Normal pulses.      Heart sounds: Normal heart sounds. No murmur heard.    No friction rub. No gallop.   Pulmonary:      Effort: Pulmonary effort is normal. No respiratory distress.      Breath sounds: Normal breath sounds. No wheezing, rhonchi or rales.   Abdominal:      General: Abdomen is flat. Bowel sounds are normal. There is no distension.      Palpations: Abdomen is soft.      Tenderness: There is no abdominal tenderness. There is no guarding or rebound.   Musculoskeletal:         General: No swelling. Normal range of motion.   Skin:     General: Skin is warm and dry.      Coloration: Skin is not jaundiced.   Neurological:      General: No focal deficit present.      Mental Status: He is alert and oriented to person, place, and time. Mental status is at baseline.   Psychiatric:         Mood and Affect: Mood normal.         Behavior: Behavior normal.         Thought Content: Thought content normal.         Judgment: Judgment normal.     Significant Labs: All pertinent labs within the past 24 hours have been reviewed.    Significant Imaging: I have reviewed all pertinent imaging results/findings within the past 24 hours.

## 2022-04-18 NOTE — PLAN OF CARE
SW visited pt at . Pt advised that he would like check on his referral with [facility]. SW advised pt that referral has been received from and under review. Advised pt that he will need additional facility choice from accepting facilities in the event that [facility] does not have beds at the time he is ready to d/c.    Pt stated he would like to d/c to [facility] if he cannot go to [facility].      SW will con't to follow.      Johanne Quevedo, SHANNON  Case Management Social Worker   [facility] [address]

## 2022-04-19 LAB
ALBUMIN SERPL BCP-MCNC: 2.5 G/DL (ref 3.5–5.2)
ALP SERPL-CCNC: 121 U/L (ref 55–135)
ALT SERPL W/O P-5'-P-CCNC: 18 U/L (ref 10–44)
ANION GAP SERPL CALC-SCNC: 9 MMOL/L (ref 8–16)
AST SERPL-CCNC: 23 U/L (ref 10–40)
BASOPHILS # BLD AUTO: 0.04 K/UL (ref 0–0.2)
BASOPHILS NFR BLD: 0.5 % (ref 0–1.9)
BILIRUB SERPL-MCNC: 0.4 MG/DL (ref 0.1–1)
BUN SERPL-MCNC: 17 MG/DL (ref 8–23)
CALCIUM SERPL-MCNC: 8.1 MG/DL (ref 8.7–10.5)
CHLORIDE SERPL-SCNC: 103 MMOL/L (ref 95–110)
CO2 SERPL-SCNC: 24 MMOL/L (ref 23–29)
CREAT SERPL-MCNC: 0.8 MG/DL (ref 0.5–1.4)
DIFFERENTIAL METHOD: ABNORMAL
EOSINOPHIL # BLD AUTO: 0.5 K/UL (ref 0–0.5)
EOSINOPHIL NFR BLD: 6.2 % (ref 0–8)
ERYTHROCYTE [DISTWIDTH] IN BLOOD BY AUTOMATED COUNT: 15.9 % (ref 11.5–14.5)
EST. GFR  (AFRICAN AMERICAN): >60 ML/MIN/1.73 M^2
EST. GFR  (NON AFRICAN AMERICAN): >60 ML/MIN/1.73 M^2
GLUCOSE SERPL-MCNC: 56 MG/DL (ref 70–110)
HCT VFR BLD AUTO: 35.6 % (ref 40–54)
HGB BLD-MCNC: 11.6 G/DL (ref 14–18)
IMM GRANULOCYTES # BLD AUTO: 0.02 K/UL (ref 0–0.04)
IMM GRANULOCYTES NFR BLD AUTO: 0.3 % (ref 0–0.5)
LYMPHOCYTES # BLD AUTO: 1.9 K/UL (ref 1–4.8)
LYMPHOCYTES NFR BLD: 23.4 % (ref 18–48)
MAGNESIUM SERPL-MCNC: 1.8 MG/DL (ref 1.6–2.6)
MCH RBC QN AUTO: 28.8 PG (ref 27–31)
MCHC RBC AUTO-ENTMCNC: 32.6 G/DL (ref 32–36)
MCV RBC AUTO: 88 FL (ref 82–98)
MONOCYTES # BLD AUTO: 0.8 K/UL (ref 0.3–1)
MONOCYTES NFR BLD: 10.6 % (ref 4–15)
NEUTROPHILS # BLD AUTO: 4.7 K/UL (ref 1.8–7.7)
NEUTROPHILS NFR BLD: 59 % (ref 38–73)
NRBC BLD-RTO: 0 /100 WBC
PHOSPHATE SERPL-MCNC: 3.3 MG/DL (ref 2.7–4.5)
PLATELET # BLD AUTO: 230 K/UL (ref 150–450)
PMV BLD AUTO: 9.8 FL (ref 9.2–12.9)
POCT GLUCOSE: 112 MG/DL (ref 70–110)
POCT GLUCOSE: 141 MG/DL (ref 70–110)
POCT GLUCOSE: 149 MG/DL (ref 70–110)
POCT GLUCOSE: 216 MG/DL (ref 70–110)
POCT GLUCOSE: 57 MG/DL (ref 70–110)
POCT GLUCOSE: 83 MG/DL (ref 70–110)
POCT GLUCOSE: 93 MG/DL (ref 70–110)
POTASSIUM SERPL-SCNC: 4.1 MMOL/L (ref 3.5–5.1)
PROT SERPL-MCNC: 5.6 G/DL (ref 6–8.4)
RBC # BLD AUTO: 4.03 M/UL (ref 4.6–6.2)
SODIUM SERPL-SCNC: 136 MMOL/L (ref 136–145)
WBC # BLD AUTO: 7.95 K/UL (ref 3.9–12.7)

## 2022-04-19 PROCEDURE — 83735 ASSAY OF MAGNESIUM: CPT | Performed by: INTERNAL MEDICINE

## 2022-04-19 PROCEDURE — 94761 N-INVAS EAR/PLS OXIMETRY MLT: CPT

## 2022-04-19 PROCEDURE — 25000003 PHARM REV CODE 250: Performed by: INTERNAL MEDICINE

## 2022-04-19 PROCEDURE — 20600001 HC STEP DOWN PRIVATE ROOM

## 2022-04-19 PROCEDURE — 99232 SBSQ HOSP IP/OBS MODERATE 35: CPT | Mod: ,,, | Performed by: INTERNAL MEDICINE

## 2022-04-19 PROCEDURE — 99900035 HC TECH TIME PER 15 MIN (STAT)

## 2022-04-19 PROCEDURE — 63600175 PHARM REV CODE 636 W HCPCS: Performed by: INTERNAL MEDICINE

## 2022-04-19 PROCEDURE — 99232 PR SUBSEQUENT HOSPITAL CARE,LEVL II: ICD-10-PCS | Mod: ,,, | Performed by: INTERNAL MEDICINE

## 2022-04-19 PROCEDURE — 84100 ASSAY OF PHOSPHORUS: CPT | Performed by: INTERNAL MEDICINE

## 2022-04-19 PROCEDURE — 99232 PR SUBSEQUENT HOSPITAL CARE,LEVL II: ICD-10-PCS | Mod: GC,,, | Performed by: INTERNAL MEDICINE

## 2022-04-19 PROCEDURE — 85025 COMPLETE CBC W/AUTO DIFF WBC: CPT | Performed by: INTERNAL MEDICINE

## 2022-04-19 PROCEDURE — 80053 COMPREHEN METABOLIC PANEL: CPT | Performed by: INTERNAL MEDICINE

## 2022-04-19 PROCEDURE — 36415 COLL VENOUS BLD VENIPUNCTURE: CPT | Performed by: INTERNAL MEDICINE

## 2022-04-19 PROCEDURE — 99232 SBSQ HOSP IP/OBS MODERATE 35: CPT | Mod: GC,,, | Performed by: INTERNAL MEDICINE

## 2022-04-19 RX ORDER — INSULIN ASPART 100 [IU]/ML
4-8 INJECTION, SOLUTION INTRAVENOUS; SUBCUTANEOUS
Status: DISCONTINUED | OUTPATIENT
Start: 2022-04-19 | End: 2022-04-20

## 2022-04-19 RX ADMIN — ATORVASTATIN CALCIUM 40 MG: 20 TABLET, FILM COATED ORAL at 09:04

## 2022-04-19 RX ADMIN — CALCIUM CARBONATE (ANTACID) CHEW TAB 500 MG 500 MG: 500 CHEW TAB at 09:04

## 2022-04-19 RX ADMIN — INSULIN ASPART 10 UNITS: 100 INJECTION, SOLUTION INTRAVENOUS; SUBCUTANEOUS at 09:04

## 2022-04-19 RX ADMIN — PANTOPRAZOLE SODIUM 40 MG: 40 TABLET, DELAYED RELEASE ORAL at 09:04

## 2022-04-19 RX ADMIN — INSULIN ASPART 4 UNITS: 100 INJECTION, SOLUTION INTRAVENOUS; SUBCUTANEOUS at 05:04

## 2022-04-19 RX ADMIN — TAMSULOSIN HYDROCHLORIDE 0.4 MG: 0.4 CAPSULE ORAL at 09:04

## 2022-04-19 RX ADMIN — LACOSAMIDE 100 MG: 100 TABLET, FILM COATED ORAL at 09:04

## 2022-04-19 RX ADMIN — AMIODARONE HYDROCHLORIDE 200 MG: 200 TABLET ORAL at 09:04

## 2022-04-19 RX ADMIN — LACOSAMIDE 100 MG: 100 TABLET, FILM COATED ORAL at 10:04

## 2022-04-19 RX ADMIN — ONDANSETRON 8 MG: 2 INJECTION INTRAMUSCULAR; INTRAVENOUS at 12:04

## 2022-04-19 RX ADMIN — METOPROLOL SUCCINATE 50 MG: 50 TABLET, EXTENDED RELEASE ORAL at 09:04

## 2022-04-19 RX ADMIN — PROCHLORPERAZINE EDISYLATE 5 MG: 5 INJECTION INTRAMUSCULAR; INTRAVENOUS at 03:04

## 2022-04-19 RX ADMIN — DEXTROSE 125 ML: 10 SOLUTION INTRAVENOUS at 07:04

## 2022-04-19 RX ADMIN — APIXABAN 5 MG: 5 TABLET, FILM COATED ORAL at 10:04

## 2022-04-19 RX ADMIN — CALCIUM CARBONATE (ANTACID) CHEW TAB 500 MG 500 MG: 500 CHEW TAB at 06:04

## 2022-04-19 RX ADMIN — SENNOSIDES AND DOCUSATE SODIUM 1 TABLET: 50; 8.6 TABLET ORAL at 10:04

## 2022-04-19 RX ADMIN — APIXABAN 5 MG: 5 TABLET, FILM COATED ORAL at 09:04

## 2022-04-19 RX ADMIN — CLOPIDOGREL 75 MG: 75 TABLET, FILM COATED ORAL at 09:04

## 2022-04-19 RX ADMIN — ONDANSETRON 8 MG: 2 INJECTION INTRAMUSCULAR; INTRAVENOUS at 04:04

## 2022-04-19 NOTE — PROGRESS NOTES
[facility] - Telemetry Stepdown  Adult Nutrition  Consult Note    SUMMARY     Recommendations    1. Continue current Diabetic diet + ONS. Encourage adequate PO intake as tolerated.  2. RD to monitor & follow-up.    Goals: Meet % EEN, EPN by RD f/u date  Nutrition Goal Status: new  Communication of RD Recs: reviewed with RN    Assessment and Plan    Severe malnutrition    Nutrition Problem:  Severe Protein-Calorie Malnutrition  Malnutrition in the context of Chronic Illness/Injury     Related to (etiology):  Inability to consume sufficient energy      Signs and Symptoms (as evidenced by):  Energy Intake: <75% of estimated energy requirement for 1-2 months  Body Fat Depletion: moderate and severe depletion of orbitals and triceps   Muscle Mass Depletion: moderate and severe depletion of temples, clavicle region and lower extremities   Weight Loss: 18% x 3-4 months     Interventions(treatment strategy):  Collaboration of nutrition care w/ other providers  ONS     Nutrition Diagnosis Status:  New/Continues     Malnutrition Assessment    Weight Loss (Malnutrition): greater than 7.5% in 3 months  Energy Intake (Malnutrition): less than 75% for greater than or equal to 1 month   Orbital Region (Subcutaneous Fat Loss): moderate depletion  Upper Arm Region (Subcutaneous Fat Loss): severe depletion   [place] Region (Muscle Loss): moderate depletion  Clavicle Bone Region (Muscle Loss): severe depletion  Dorsal Hand (Muscle Loss): moderate depletion  Anterior Thigh Region (Muscle Loss): severe depletion     Reason for Assessment    Reason For Assessment: RD follow-up  Diagnosis: other (see comments) (DKA)  Relevant Medical History: HTN, DM  Interdisciplinary Rounds: did not attend    General Information Comments: Diabetic diet education complete 4/6. Per RN documentation, pt tolerating diet - however poor PO intake noted; ONS also ordered. Information obtained from previous RD assessment 4/6: PTA - pt reports decreased  "appetite x 1-2 months & weight loss x 3-4 months; chart review confirms weight loss (weighed 200# x 2022). Pt meets criteria for severe malnutrition. Please see PES statement for details.  Nutrition Discharge Planning: Adequate PO intake    Nutrition/Diet History    Spiritual, Cultural Beliefs, Adventist Practices, Values that Affect Care: no  Factors Affecting Nutritional Intake: decreased appetite    Anthropometrics    Temp: 97.5 °F (36.4 °C)  Height: 5' 11" (180.3 cm)  Height (inches): 71 in  Weight: 81.6 kg (179 lb 14.3 oz)  Weight (lb): 179.9 lb  Ideal Body Weight (IBW), Male: 172 lb  % Ideal Body Weight, Male (lb): 104.59 %  BMI (Calculated): 25.1  BMI Grade: 25 - 29.9 - overweight  Usual Body Weight (UBW), k kg  % Usual Body Weight: 89.86  % Weight Change From Usual Weight: -10.33 %    Lab/Procedures/Meds    Pertinent Labs Reviewed: reviewed  Pertinent Labs Comments: A1C 9.7  Pertinent Medications Reviewed: reviewed    Estimated/Assessed Needs    Weight Used For Calorie Calculations: 81.6 kg (179 lb 14.3 oz)     Energy Calorie Requirements (kcal): 1948 kcal/d  Energy Need Method: [place]-St Jeor (1.25 PAL)     Protein Requirements: 98 g/d (1.2 g/kg)  Weight Used For Protein Calculations: 81.6 kg (179 lb 14.3 oz)     Estimated Fluid Requirement Method: other (see comments) (Per MD or 1 mL/kcal)  RDA Method (mL):     Nutrition Prescription Ordered    Current Diet Order: 1500 kcal ADA  Oral Nutrition Supplement: Boost Glucose Control ONS    Evaluation of Received Nutrient/Fluid Intake    I/O: +1.1L since admit    Comments: LBM:     Tolerance: tolerating    Nutrition Risk    Level of Risk/Frequency of Follow-up: (1x/week)     Monitor and Evaluation    Food and Nutrient Intake: energy intake, food and beverage intake  Food and Nutrient Adminstration: diet order  Physical Activity and Function: nutrition-related ADLs and IADLs  Anthropometric Measurements: weight, weight change  Biochemical Data, " Medical Tests and Procedures: inflammatory profile, lipid profile, glucose/endocrine profile, gastrointestinal profile, electrolyte and renal panel  Nutrition-Focused Physical Findings: overall appearance     Nutrition Follow-Up    RD Follow-up?: Yes

## 2022-04-19 NOTE — PROGRESS NOTES
[facility] - Telemetry Stepdown  Endocrinology  Progress Note    Admit Date: 4/11/2022     76 year old  male with T2DM, HTN, CAD, STEMI, HLD, paroxysmal Afib, CVA, seizures who presents for recurrent DKA.  He presented to [facility] ED on 4/11 with elevated blood glucose.  He was discharged from [facility] on 4/10 after being admitted for DKA on 4/5. Per family he was not feeling well after discharge to home and vomited several times on 4/11. Finger stick at home read High with unreadable blood glucose.  At this time daughter gave him 14 units of insulin and sublingual zofran. Other than malaise and nausea patient was not exhibiting any other signs/symptoms at home.    - In ER BG found to be 1015 with pCO2 6 and anion gap 35.  Hyperkalemic with K 7.0 and some EKG changes when compared to previous EKG.  Given calcium gluconate, and started on insulin gtt as well as subQ long acting insulin     - Endocrinology consulted for management of type 2 diabetes after resolution of DKA    - Spoke with daughter who states patient was discharged on 04/10/2022 with high glucose in the 400s which worsened after getting home and eating dinner; appropriate mealtime and correction insulin were given at that time. However, the next day patient's condition was worsening and they called EMS and his sugar was found to be in the thousands. She expresses concern that he cannot be care for adequately at home any longer and wishes to pursue skilled nursing facility.    Regarding Diabetes Mellitus:    Recurring episodes of DKA  Surgical Procedure and Date: NA  Diabetes diagnosis year: >20 years  Home Diabetes Medications:  During recent SNF was on Aspart 8 TID and glargine 8 units daily with sliding scale  How often checking glucose at home? >4 x day   Diabetes Complications include: Hyperglycemia, Hypoglycemia , and Diabetic peripheral neuropathy   Complicating diabetes co morbidities: History of CVA      Interval HPI:   - Hypoglycemic this  "morning, had poor meal intake last night      BP (!) 99/57 (Patient Position: Lying)   Pulse (!) 59   Temp 97.5 °F (36.4 °C) (Oral)   Resp 18   Ht 5' 11" (1.803 m)   Wt 81.6 kg (180 lb)   SpO2 95%   BMI 25.10 kg/m²     Labs Reviewed and Include    Recent Labs   Lab 04/19/22  0233   GLU 56*   CALCIUM 8.1*   ALBUMIN 2.5*   PROT 5.6*      K 4.1   CO2 24      BUN 17   CREATININE 0.8   ALKPHOS 121   ALT 18   AST 23   BILITOT 0.4     Lab Results   Component Value Date    WBC 7.95 04/19/2022    HGB 11.6 (L) 04/19/2022    HCT 35.6 (L) 04/19/2022    MCV 88 04/19/2022     04/19/2022     No results for input(s): TSH, FREET4 in the last 168 hours.  Lab Results   Component Value Date    HGBA1C 9.7 (H) 04/05/2022       Nutritional status:   Body mass index is 25.1 kg/m².  Lab Results   Component Value Date    ALBUMIN 2.5 (L) 04/19/2022    ALBUMIN 2.5 (L) 04/18/2022    ALBUMIN 2.4 (L) 04/17/2022     No results found for: PREALBUMIN    Estimated Creatinine Clearance: 81.1 mL/min (based on SCr of 0.8 mg/dL).    Accu-Checks  Recent Labs     04/17/22  2110 04/18/22  0743 04/18/22  1227 04/18/22  1551 04/18/22  1810 04/18/22  2019 04/19/22  0729 04/19/22  0758 04/19/22  0906 04/19/22  1119   POCTGLUCOSE 89 312* 295* 255* 240* 126* 57* 112* 93 83       Current Medications and/or Treatments Impacting Glycemic Control  Immunotherapy:    Immunosuppressants       None          Steroids:   Hormones (From admission, onward)                Start     Stop Route Frequency Ordered    04/14/22 1047  melatonin tablet 6 mg  (Medication Panel)         -- Oral Nightly PRN 04/14/22 0948          Pressors:    Autonomic Drugs (From admission, onward)                None          Hyperglycemia/Diabetes Medications:   Antihyperglycemics (From admission, onward)                Start     Stop Route Frequency Ordered    04/19/22 2100  insulin detemir U-100 pen 8 Units         -- SubQ 2 times daily 04/19/22 0919    04/19/22 1130  " insulin aspart U-100 pen 4-8 Units         -- SubQ 3 times daily with meals 22 0917    22 0111  insulin aspart U-100 pen 4 Units         -- SubQ With snacks 22 0012    22 0819  insulin aspart U-100 pen 1-10 Units         -- SubQ Every 6 hours PRN 22 0720            ASSESSMENT and PLAN    * Diabetic ketoacidosis without coma associated with type 2 diabetes mellitus  - No longer in DKA    Ketosis-prone diabetes mellitus  Key History and Diagnostic Findings  - A1c of 9.7 on 2022 from 11.5 on 2022  - Home Regimen: Levemir 8 units daily, aspart 8 units TIDWM  - Weight based dosin kg x 0.5 = 41 TDD x 0.5 = 20 basal / 20 prandial  - 1700/TDD = 41 (estimated insulin sensitivity factor)  - 450/TDD = 11 (estimated starting carb ratio for prandial dosing)  - Glucose Goals: 160-200mg/dL  - 24 hour glucose trend: hypoglycemic this morning    Plan  - change levemir to 8 units twice daily  - change aspart to 4-8 units TIDWM with moderate correction scale depending on his meal intake  - Hypoglycemia protocol in place  - If blood glucose greater than 300, please ask patient not to eat food or drink anything other than water until correctional insulin has brought it back below 250  - Please ensure patient receives their p.r.n. insulin aspart if they eat a snack with more than 30 g of carbohydrate  - Daughter wishes to pursue skilled nursing facility for discharge as she states he cannot be adequately taken care of at home    Coronary artery disease  - Strive to optimize glucose control        Modesto Abraham MD  Endocrinology  [facility]

## 2022-04-19 NOTE — ASSESSMENT & PLAN NOTE
- Resolved  - Unclear inciting etiology missed insulin doses vs sepsis.    - Recent admission 4/5 for DKA and [place] [facility] [place].  Per family malaise since discharge with vomiting starting 4/10.  BG at home greater unreadable.  Given 14 units subQ insulin at that time.  In ER BG found to be 1015 with pCO2 6 and anion gap 35 and hyperkalemia.  Started on insulin gtt in ED and given 20 units subQ long acting insulin.    - Started on DKA protocol in the ICU, now on subq insulin  - Endocrine consulted, adjusting insulin  - Advanced diet   - [facility], hypoglycemia protocol , SSI

## 2022-04-19 NOTE — PROGRESS NOTES
[facility] - Telemetry [facility] Medicine  Progress Note    Patient Name: Kamar Muñoz  MRN: [id number]  Patient Class: IP- Inpatient   Admission Date: 4/11/2022  Length of Stay: 8 days  Attending Physician: Siva Hassan MD  Primary Care Provider: Basim Guerrero MD      Subjective:     Principal Problem:Diabetic ketoacidosis without coma associated with type 2 diabetes mellitus      HPI:  Mr. Kamar Muñoz is a 76 y.o. male with a past medical history of HTN, Type 2 DM, CAD, STEMI, HLD, paroxysmal Afib, CVA, seizures and recurrent DKA.  He presented to [facility] ED on 4/11 with elevated blood glucose.  He was discharged from [facility] on 4/10 after being admitted for DKA on 4/5.  At time of exam patient is alert but altered and unable to provide any history.  Spoke with patient's daughter who states she is a primary caregiver at home and obtained history as well as review of chart.  Per family he was not feeling well after discharge to home and vomited several times on 4/11. Unknown characteristics of emesis. Finger stick at home read High with unreadable blood glucose.  At this time daughter gave him 14 units of insulin and sublingual zofran. Other than malaise and nausea patient was not exhibiting any other signs/symptoms at home.  In ER BG found to be 1015 with pCO2 6 and anion gap 35.  Hyperkalemic with K 7.0 and some EKG changes when compared to previous EKG.  Given calcium gluconate, and started on insulin gtt as well as subQ long acting insulin.      Critical Care Medicine consulted for DKA and admitted to MICU.        Overview/Hospital Course:  Admitted to MICU on 4/11 for DKA with BG >1000, pCO2 6, AG 35, and hyperkalemia.  Given Calcium gluconate and started on insulin gtt in ED.  Hyperkalemia not improved on repeat labs and bolused with IV insulin.  Infectious workup sent and broad spectrum abx started.  4/12 potassium improving with insulin. 4/13 Gap closed, insulin gtt turned off and  switched to subq insulin. Endocrine consulted for insulin mgmt. He was stepped down to  4/14.    Since step down stable. Endocrine following and titrating insulin. Advanced diet. PT OT ordered for safe dispo plan rec SNF.      Interval History: No issues this morning. Glucose continues to be labile. No new complaints today other than weakness. He continues to have poor appetite. Hes on DM diet and DM precautions. Aox4. Endocrine following, titrating insulin. No complaints at this time. Planning SNF on discharge. Plan to update son today.    Yesterday afternoon I had a long conversation with son regarding dietary issues and labile glucose. Dietary issues were escalated. Patient continues to have poor intermittent appetite with fluctuating glucose. Explained to son that his sugars have been labile due to this. Son was not happy with dietary in the hospital and continues to blame dietary although I believe this is multifactorial as he was discharged in the past then came back with DKA few days later post SNF. Patient is unable to care for himself at home, and he knows this, however he is unwilling to go any where other than home.       Review of Systems   Constitutional:  Positive for appetite change and weakness. Negative for fever.   HENT:  Negative for rhinorrhea and sore throat.    Respiratory:  Negative for cough and shortness of breath.    Cardiovascular:  Negative for chest pain and palpitations.   Gastrointestinal:  Negative for pain, N/V diarrhea.   Genitourinary:  Negative for difficulty urinating and hematuria.   Musculoskeletal:  Negative for back pain and neck pain.   Skin:  Negative for rash and wound.   Neurological:  Negative for tremors and headaches.   All other systems reviewed and are negative.    Objective:     Vital Signs (Most Recent):  Temp: 97.5 °F (36.4 °C) (04/19/22 1119)  Pulse: (!) 59 (04/19/22 1119)  Resp: 18 (04/19/22 1119)  BP: (!) 99/57 (04/19/22 1119)  SpO2: 95 % (04/19/22 1119)    Vital Signs (24h Range):  Temp:  [96.1 °F (35.6 °C)-98 °F (36.7 °C)] 97.5 °F (36.4 °C)  Pulse:  [57-65] 59  Resp:  [12-18] 18  SpO2:  [92 %-97 %] 95 %  BP: ()/(56-70) 99/57     Weight: 81.6 kg (180 lb)  Body mass index is 25.1 kg/m².    Intake/Output Summary (Last 24 hours) at 4/19/2022 1133  Last data filed at 4/19/2022 0745  Gross per 24 hour   Intake 240 ml   Output --   Net 240 ml        Physical Exam  Vitals and nursing note reviewed.   Constitutional:       General: He is not in acute distress.     Appearance: Normal appearance. He is normal weight. He is not ill-appearing, toxic-appearing or diaphoretic.   HENT:      Head: Normocephalic and atraumatic.      Right Ear: External ear normal.      Left Ear: External ear normal.      Nose: Nose normal.   Cardiovascular:      Rate and Rhythm: Normal rate and regular rhythm.      Pulses: Normal pulses.      Heart sounds: Normal heart sounds. No murmur heard.    No friction rub. No gallop.   Pulmonary:      Effort: Pulmonary effort is normal. No respiratory distress.      Breath sounds: Normal breath sounds. No wheezing, rhonchi or rales.   Abdominal:      General: Abdomen is flat. Bowel sounds are normal. There is no distension.      Palpations: Abdomen is soft.      Tenderness: There is no abdominal tenderness. There is no guarding or rebound.   Musculoskeletal:         General: No swelling. Normal range of motion.   Skin:     General: Skin is warm and dry.      Coloration: Skin is not jaundiced.   Neurological:      General: No focal deficit present.      Mental Status: He is alert and oriented to person, place, and time. Mental status is at baseline.   Psychiatric:         Mood and Affect: Mood normal.         Behavior: Behavior normal.         Thought Content: Thought content normal.         Judgment: Judgment normal.     Significant Labs: All pertinent labs within the past 24 hours have been reviewed.    Significant Imaging: I have reviewed all pertinent  imaging results/findings within the past 24 hours.      Assessment/Plan:      * Diabetic ketoacidosis without coma associated with type 2 diabetes mellitus  - Resolved  - Unclear inciting etiology missed insulin doses vs sepsis.    - Recent admission 4/5 for DKA and discharged from [facility] [place] [place] 4/10.  Per family malaise since discharge with vomiting starting 4/10.  BG at home greater unreadable.  Given 14 units subQ insulin at that time.  In ER BG found to be 1015 with pCO2 6 and anion gap 35 and hyperkalemia.  Started on insulin gtt in ED and given 20 units subQ long acting insulin.    - Started on DKA protocol in the ICU, now on subq insulin  - Endocrine consulted, adjusting insulin  - Advanced diet   - ACHS, hypoglycemia protocol , SSI    Lactic acidosis  - LA 10.3 on hospital admission.  Concern for infectious process given WBC on admission 17.05, on 4/10 prior to discharge WBC 6.82. CXR with bilateral interstitial pattern, no focal areas of consolidation.    - Procalcitonin 1.18  - Blood cultures NGTD  - Antibiotics discontinued in the ICU  - Low suspicion for any infectious etiology , likely from DKA       Goals of care, counseling/discussion  - DNR as per ICU    Ketosis-prone diabetes mellitus  - see DKA      Focal seizures  - History of seizures on lacosamide.    - Continue home lacosamide    Coronary artery disease  - Stable  - Continue home regimen of aspirin, atorvastatin, clopidogrel, losartan, and metoprolol      Paroxysmal atrial fibrillation  - History of afib.    - Continue home regimen of amiodarone, apixaban, and metoprolol        BRENDAN (acute kidney injury)  - Resolved  - Likely secondary to dehydration in the setting of DKA.  Baseline Cr per chart review ~1.0.  Cr on admission 2.3.     Essential hypertension  - resume home meds as able        VTE Risk Mitigation (From admission, onward)         Ordered     apixaban tablet 5 mg  2 times daily         04/12/22 0142     IP VTE HIGH RISK PATIENT  Once          04/11/22 2342     Place sequential compression device  Until discontinued         04/11/22 2138                Discharge Planning   ALLIE: 4/20/2022     Code Status: DNR   Is the patient medically ready for discharge?: Yes    Reason for patient still in hospital (select all that apply): Patient trending condition  Discharge Plan A: Skilled Nursing Facility   Discharge Delays: None known at this time      Siva Hassan MD  Department of Hospital Medicine   [facility] - Telemetry Stepdown
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
## 2022-04-19 NOTE — PLAN OF CARE
Problem: Adult Inpatient Plan of Care  Goal: Plan of Care Review  Outcome: Ongoing, Progressing  Goal: Patient-Specific Goal (Individualized)  Outcome: Ongoing, Progressing  Goal: Absence of Hospital-Acquired Illness or Injury  Outcome: Ongoing, Progressing  Goal: Optimal Comfort and Wellbeing  Outcome: Ongoing, Progressing  Goal: Readiness for Transition of Care  Outcome: Ongoing, Progressing     Problem: Diabetes Comorbidity  Goal: Blood Glucose Level Within Targeted Range  Outcome: Ongoing, Progressing     Problem: Fluid and Electrolyte Imbalance (Acute Kidney Injury/Impairment)  Goal: Fluid and Electrolyte Balance  Outcome: Ongoing, Progressing     Problem: Oral Intake Inadequate (Acute Kidney Injury/Impairment)  Goal: Optimal Nutrition Intake  Outcome: Ongoing, Progressing     Problem: Renal Function Impairment (Acute Kidney Injury/Impairment)  Goal: Effective Renal Function  Outcome: Ongoing, Progressing     Problem: Impaired Wound Healing  Goal: Optimal Wound Healing  Outcome: Ongoing, Progressing     Problem: Skin Injury Risk Increased  Goal: Skin Health and Integrity  Outcome: Ongoing, Progressing     Problem: Diabetic Ketoacidosis  Goal: Fluid and Electrolyte Balance with Absence of Ketosis  Outcome: Ongoing, Progressing     Problem: Fall Injury Risk  Goal: Absence of Fall and Fall-Related Injury  Outcome: Ongoing, Progressing    Pt AAOx4, VS WNL, on room air, ambulates with the assistance of a walker. Stated no complaints throughout the shift. Spent part of the afternoon up in his chair. Stated that he had little appetite and that he does not like the food he is served, but is currently in bed eating dinner with the assistance of his daughters. No s/s of distress noted at this time.

## 2022-04-19 NOTE — ASSESSMENT & PLAN NOTE
Key History and Diagnostic Findings  - A1c of 9.7 on 2022 from 11.5 on 2022  - Home Regimen: Levemir 8 units daily, aspart 8 units TIDWM  - Weight based dosin kg x 0.5 = 41 TDD x 0.5 = 20 basal / 20 prandial  - 1700/TDD = 41 (estimated insulin sensitivity factor)  - 450/TDD = 11 (estimated starting carb ratio for prandial dosing)  - Glucose Goals: 160-200mg/dL  - 24 hour glucose trend: hypoglycemic this morning    Plan  - change levemir to 8 units twice daily  - change aspart to 4-8 units TIDWM with moderate correction scale depending on his meal intake  - Hypoglycemia protocol in place  - If blood glucose greater than 300, please ask patient not to eat food or drink anything other than water until correctional insulin has brought it back below 250  - Please ensure patient receives their p.r.n. insulin aspart if they eat a snack with more than 30 g of carbohydrate  - Daughter wishes to pursue skilled nursing facility for discharge as she states he cannot be adequately taken care of at home

## 2022-04-19 NOTE — ASSESSMENT & PLAN NOTE
Severe malnutrition     Nutrition Problem:  Severe Protein-Calorie Malnutrition  Malnutrition in the context of Chronic Illness/Injury     Related to (etiology):  Inability to consume sufficient energy      Signs and Symptoms (as evidenced by):  Energy Intake: <75% of estimated energy requirement for 1-2 months  Body Fat Depletion: moderate and severe depletion of orbitals and triceps   Muscle Mass Depletion: moderate and severe depletion of temples, clavicle region and lower extremities   Weight Loss: 18% x 3-4 months     Interventions(treatment strategy):  Collaboration of nutrition care w/ other providers  ONS     Nutrition Diagnosis Status:  New/Continues

## 2022-04-19 NOTE — SUBJECTIVE & OBJECTIVE
"Interval HPI:   - Hypoglycemic this morning, had poor meal intake last night      BP (!) 99/57 (Patient Position: Lying)   Pulse (!) 59   Temp 97.5 °F (36.4 °C) (Oral)   Resp 18   Ht 5' 11" (1.803 m)   Wt 81.6 kg (180 lb)   SpO2 95%   BMI 25.10 kg/m²     Labs Reviewed and Include    Recent Labs   Lab 04/19/22  0233   GLU 56*   CALCIUM 8.1*   ALBUMIN 2.5*   PROT 5.6*      K 4.1   CO2 24      BUN 17   CREATININE 0.8   ALKPHOS 121   ALT 18   AST 23   BILITOT 0.4     Lab Results   Component Value Date    WBC 7.95 04/19/2022    HGB 11.6 (L) 04/19/2022    HCT 35.6 (L) 04/19/2022    MCV 88 04/19/2022     04/19/2022     No results for input(s): TSH, FREET4 in the last 168 hours.  Lab Results   Component Value Date    HGBA1C 9.7 (H) 04/05/2022       Nutritional status:   Body mass index is 25.1 kg/m².  Lab Results   Component Value Date    ALBUMIN 2.5 (L) 04/19/2022    ALBUMIN 2.5 (L) 04/18/2022    ALBUMIN 2.4 (L) 04/17/2022     No results found for: PREALBUMIN    Estimated Creatinine Clearance: 81.1 mL/min (based on SCr of 0.8 mg/dL).    Accu-Checks  Recent Labs     04/17/22  2110 04/18/22  0743 04/18/22  1227 04/18/22  1551 04/18/22  1810 04/18/22  2019 04/19/22  0729 04/19/22  0758 04/19/22  0906 04/19/22  1119   POCTGLUCOSE 89 312* 295* 255* 240* 126* 57* 112* 93 83       Current Medications and/or Treatments Impacting Glycemic Control  Immunotherapy:    Immunosuppressants       None          Steroids:   Hormones (From admission, onward)                Start     Stop Route Frequency Ordered    04/14/22 1047  melatonin tablet 6 mg  (Medication Panel)         -- Oral Nightly PRN 04/14/22 0948          Pressors:    Autonomic Drugs (From admission, onward)                None          Hyperglycemia/Diabetes Medications:   Antihyperglycemics (From admission, onward)                Start     Stop Route Frequency Ordered    04/19/22 2100  insulin detemir U-100 pen 8 Units         -- SubQ 2 times daily " 04/19/22 0919    04/19/22 1130  insulin aspart U-100 pen 4-8 Units         -- SubQ 3 times daily with meals 04/19/22 0917    04/17/22 0111  insulin aspart U-100 pen 4 Units         -- SubQ With snacks 04/17/22 0012    04/13/22 0819  insulin aspart U-100 pen 1-10 Units         -- SubQ Every 6 hours PRN 04/13/22 0720

## 2022-04-19 NOTE — SUBJECTIVE & OBJECTIVE
Interval History: No issues this morning. Glucose continues to be labile. No new complaints today other than weakness. He continues to have poor appetite. Hes on DM diet and DM precautions. Aox4. Endocrine following, titrating insulin. No complaints at this time. Planning SNF on discharge. Plan to update son today.    Yesterday afternoon I had a long conversation with son regarding dietary issues and labile glucose. Dietary issues were escalated. Patient continues to have poor intermittent appetite with fluctuating glucose. Explained to son that his sugars have been labile due to this. Son was not happy with dietary in the hospital and continues to blame dietary although I believe this is multifactorial as he was discharged in the past then came back with DKA few days later post SNF. Patient is unable to care for himself at home, and he knows this, however he is unwilling to go any where other than home.       Review of Systems   Constitutional:  Positive for appetite change and weakness. Negative for fever.   HENT:  Negative for rhinorrhea and sore throat.    Respiratory:  Negative for cough and shortness of breath.    Cardiovascular:  Negative for chest pain and palpitations.   Gastrointestinal:  Negative for pain, N/V diarrhea.   Genitourinary:  Negative for difficulty urinating and hematuria.   Musculoskeletal:  Negative for back pain and neck pain.   Skin:  Negative for rash and wound.   Neurological:  Negative for tremors and headaches.   All other systems reviewed and are negative.    Objective:     Vital Signs (Most Recent):  Temp: 97.5 °F (36.4 °C) (04/19/22 1119)  Pulse: (!) 59 (04/19/22 1119)  Resp: 18 (04/19/22 1119)  BP: (!) 99/57 (04/19/22 1119)  SpO2: 95 % (04/19/22 1119)   Vital Signs (24h Range):  Temp:  [96.1 °F (35.6 °C)-98 °F (36.7 °C)] 97.5 °F (36.4 °C)  Pulse:  [57-65] 59  Resp:  [12-18] 18  SpO2:  [92 %-97 %] 95 %  BP: ()/(56-70) 99/57     Weight: 81.6 kg (180 lb)  Body mass index is  25.1 kg/m².    Intake/Output Summary (Last 24 hours) at 4/19/2022 1133  Last data filed at 4/19/2022 0745  Gross per 24 hour   Intake 240 ml   Output --   Net 240 ml        Physical Exam  Vitals and nursing note reviewed.   Constitutional:       General: He is not in acute distress.     Appearance: Normal appearance. He is normal weight. He is not ill-appearing, toxic-appearing or diaphoretic.   HENT:      Head: Normocephalic and atraumatic.      Right Ear: External ear normal.      Left Ear: External ear normal.      Nose: Nose normal.   Cardiovascular:      Rate and Rhythm: Normal rate and regular rhythm.      Pulses: Normal pulses.      Heart sounds: Normal heart sounds. No murmur heard.    No friction rub. No gallop.   Pulmonary:      Effort: Pulmonary effort is normal. No respiratory distress.      Breath sounds: Normal breath sounds. No wheezing, rhonchi or rales.   Abdominal:      General: Abdomen is flat. Bowel sounds are normal. There is no distension.      Palpations: Abdomen is soft.      Tenderness: There is no abdominal tenderness. There is no guarding or rebound.   Musculoskeletal:         General: No swelling. Normal range of motion.   Skin:     General: Skin is warm and dry.      Coloration: Skin is not jaundiced.   Neurological:      General: No focal deficit present.      Mental Status: He is alert and oriented to person, place, and time. Mental status is at baseline.   Psychiatric:         Mood and Affect: Mood normal.         Behavior: Behavior normal.         Thought Content: Thought content normal.         Judgment: Judgment normal.     Significant Labs: All pertinent labs within the past 24 hours have been reviewed.    Significant Imaging: I have reviewed all pertinent imaging results/findings within the past 24 hours.

## 2022-04-19 NOTE — PLAN OF CARE
Recommendations     1. Continue current Diabetic diet + ONS. Encourage adequate PO intake as tolerated.  2. RD to monitor & follow-up.     Goals: Meet % EEN, EPN by RD f/u date  Nutrition Goal Status: new  Communication of RD Recs: reviewed with RN

## 2022-04-20 PROBLEM — J98.4 PULMONARY CAVITARY LESION: Status: ACTIVE | Noted: 2022-04-20

## 2022-04-20 LAB
ALBUMIN SERPL BCP-MCNC: 2.4 G/DL (ref 3.5–5.2)
ALP SERPL-CCNC: 113 U/L (ref 55–135)
ALT SERPL W/O P-5'-P-CCNC: 17 U/L (ref 10–44)
ANION GAP SERPL CALC-SCNC: 6 MMOL/L (ref 8–16)
AST SERPL-CCNC: 21 U/L (ref 10–40)
BASOPHILS # BLD AUTO: 0.02 K/UL (ref 0–0.2)
BASOPHILS NFR BLD: 0.3 % (ref 0–1.9)
BILIRUB SERPL-MCNC: 0.5 MG/DL (ref 0.1–1)
BUN SERPL-MCNC: 16 MG/DL (ref 8–23)
CALCIUM SERPL-MCNC: 8.3 MG/DL (ref 8.7–10.5)
CHLORIDE SERPL-SCNC: 103 MMOL/L (ref 95–110)
CO2 SERPL-SCNC: 27 MMOL/L (ref 23–29)
CREAT SERPL-MCNC: 0.9 MG/DL (ref 0.5–1.4)
DIFFERENTIAL METHOD: ABNORMAL
EOSINOPHIL # BLD AUTO: 0.4 K/UL (ref 0–0.5)
EOSINOPHIL NFR BLD: 6.6 % (ref 0–8)
ERYTHROCYTE [DISTWIDTH] IN BLOOD BY AUTOMATED COUNT: 16.2 % (ref 11.5–14.5)
EST. GFR  (AFRICAN AMERICAN): >60 ML/MIN/1.73 M^2
EST. GFR  (NON AFRICAN AMERICAN): >60 ML/MIN/1.73 M^2
GLUCOSE SERPL-MCNC: 99 MG/DL (ref 70–110)
HCT VFR BLD AUTO: 35.5 % (ref 40–54)
HGB BLD-MCNC: 11.5 G/DL (ref 14–18)
IMM GRANULOCYTES # BLD AUTO: 0.03 K/UL (ref 0–0.04)
IMM GRANULOCYTES NFR BLD AUTO: 0.5 % (ref 0–0.5)
LYMPHOCYTES # BLD AUTO: 1.4 K/UL (ref 1–4.8)
LYMPHOCYTES NFR BLD: 23.6 % (ref 18–48)
MAGNESIUM SERPL-MCNC: 1.6 MG/DL (ref 1.6–2.6)
MCH RBC QN AUTO: 29.5 PG (ref 27–31)
MCHC RBC AUTO-ENTMCNC: 32.4 G/DL (ref 32–36)
MCV RBC AUTO: 91 FL (ref 82–98)
MONOCYTES # BLD AUTO: 0.7 K/UL (ref 0.3–1)
MONOCYTES NFR BLD: 11.9 % (ref 4–15)
NEUTROPHILS # BLD AUTO: 3.4 K/UL (ref 1.8–7.7)
NEUTROPHILS NFR BLD: 57.1 % (ref 38–73)
NRBC BLD-RTO: 0 /100 WBC
PHOSPHATE SERPL-MCNC: 3.8 MG/DL (ref 2.7–4.5)
PLATELET # BLD AUTO: 230 K/UL (ref 150–450)
PMV BLD AUTO: 9.7 FL (ref 9.2–12.9)
POCT GLUCOSE: 108 MG/DL (ref 70–110)
POCT GLUCOSE: 291 MG/DL (ref 70–110)
POCT GLUCOSE: 300 MG/DL (ref 70–110)
POCT GLUCOSE: 322 MG/DL (ref 70–110)
POTASSIUM SERPL-SCNC: 3.7 MMOL/L (ref 3.5–5.1)
PROT SERPL-MCNC: 6 G/DL (ref 6–8.4)
RBC # BLD AUTO: 3.9 M/UL (ref 4.6–6.2)
SODIUM SERPL-SCNC: 136 MMOL/L (ref 136–145)
WBC # BLD AUTO: 5.88 K/UL (ref 3.9–12.7)

## 2022-04-20 PROCEDURE — 84100 ASSAY OF PHOSPHORUS: CPT | Performed by: INTERNAL MEDICINE

## 2022-04-20 PROCEDURE — 97530 THERAPEUTIC ACTIVITIES: CPT

## 2022-04-20 PROCEDURE — 87305 ASPERGILLUS AG IA: CPT | Performed by: INTERNAL MEDICINE

## 2022-04-20 PROCEDURE — 99233 PR SUBSEQUENT HOSPITAL CARE,LEVL III: ICD-10-PCS | Mod: ,,, | Performed by: INTERNAL MEDICINE

## 2022-04-20 PROCEDURE — 25000003 PHARM REV CODE 250: Performed by: INTERNAL MEDICINE

## 2022-04-20 PROCEDURE — 99223 PR INITIAL HOSPITAL CARE,LEVL III: ICD-10-PCS | Mod: GC,,, | Performed by: STUDENT IN AN ORGANIZED HEALTH CARE EDUCATION/TRAINING PROGRAM

## 2022-04-20 PROCEDURE — 83735 ASSAY OF MAGNESIUM: CPT | Performed by: INTERNAL MEDICINE

## 2022-04-20 PROCEDURE — 99233 SBSQ HOSP IP/OBS HIGH 50: CPT | Mod: ,,, | Performed by: INTERNAL MEDICINE

## 2022-04-20 PROCEDURE — 99223 1ST HOSP IP/OBS HIGH 75: CPT | Mod: GC,,, | Performed by: STUDENT IN AN ORGANIZED HEALTH CARE EDUCATION/TRAINING PROGRAM

## 2022-04-20 PROCEDURE — 63600175 PHARM REV CODE 636 W HCPCS: Performed by: INTERNAL MEDICINE

## 2022-04-20 PROCEDURE — 99232 PR SUBSEQUENT HOSPITAL CARE,LEVL II: ICD-10-PCS | Mod: GC,,, | Performed by: INTERNAL MEDICINE

## 2022-04-20 PROCEDURE — 20600001 HC STEP DOWN PRIVATE ROOM

## 2022-04-20 PROCEDURE — 87449 NOS EACH ORGANISM AG IA: CPT | Performed by: INTERNAL MEDICINE

## 2022-04-20 PROCEDURE — 86635 COCCIDIOIDES ANTIBODY: CPT | Performed by: INTERNAL MEDICINE

## 2022-04-20 PROCEDURE — 80053 COMPREHEN METABOLIC PANEL: CPT | Performed by: INTERNAL MEDICINE

## 2022-04-20 PROCEDURE — 85025 COMPLETE CBC W/AUTO DIFF WBC: CPT | Performed by: INTERNAL MEDICINE

## 2022-04-20 PROCEDURE — 99232 SBSQ HOSP IP/OBS MODERATE 35: CPT | Mod: GC,,, | Performed by: INTERNAL MEDICINE

## 2022-04-20 RX ORDER — AMOXICILLIN AND CLAVULANATE POTASSIUM 875; 125 MG/1; MG/1
1 TABLET, FILM COATED ORAL EVERY 12 HOURS
Status: COMPLETED | OUTPATIENT
Start: 2022-04-20 | End: 2022-04-26

## 2022-04-20 RX ORDER — ENOXAPARIN SODIUM 100 MG/ML
40 INJECTION SUBCUTANEOUS EVERY 24 HOURS
Status: DISCONTINUED | OUTPATIENT
Start: 2022-04-20 | End: 2022-04-20

## 2022-04-20 RX ORDER — INSULIN ASPART 100 [IU]/ML
6-10 INJECTION, SOLUTION INTRAVENOUS; SUBCUTANEOUS
Status: DISCONTINUED | OUTPATIENT
Start: 2022-04-21 | End: 2022-04-21

## 2022-04-20 RX ADMIN — INSULIN ASPART 4 UNITS: 100 INJECTION, SOLUTION INTRAVENOUS; SUBCUTANEOUS at 01:04

## 2022-04-20 RX ADMIN — INSULIN ASPART 4 UNITS: 100 INJECTION, SOLUTION INTRAVENOUS; SUBCUTANEOUS at 08:04

## 2022-04-20 RX ADMIN — LACOSAMIDE 100 MG: 100 TABLET, FILM COATED ORAL at 09:04

## 2022-04-20 RX ADMIN — TAMSULOSIN HYDROCHLORIDE 0.4 MG: 0.4 CAPSULE ORAL at 09:04

## 2022-04-20 RX ADMIN — AMOXICILLIN AND CLAVULANATE POTASSIUM 1 TABLET: 875; 125 TABLET, FILM COATED ORAL at 09:04

## 2022-04-20 RX ADMIN — PANTOPRAZOLE SODIUM 40 MG: 40 TABLET, DELAYED RELEASE ORAL at 08:04

## 2022-04-20 RX ADMIN — APIXABAN 5 MG: 5 TABLET, FILM COATED ORAL at 01:04

## 2022-04-20 RX ADMIN — APIXABAN 5 MG: 5 TABLET, FILM COATED ORAL at 09:04

## 2022-04-20 RX ADMIN — LACOSAMIDE 100 MG: 100 TABLET, FILM COATED ORAL at 08:04

## 2022-04-20 RX ADMIN — ACETAMINOPHEN 500 MG: 500 TABLET ORAL at 11:04

## 2022-04-20 RX ADMIN — INSULIN ASPART 6 UNITS: 100 INJECTION, SOLUTION INTRAVENOUS; SUBCUTANEOUS at 06:04

## 2022-04-20 RX ADMIN — INSULIN ASPART 8 UNITS: 100 INJECTION, SOLUTION INTRAVENOUS; SUBCUTANEOUS at 01:04

## 2022-04-20 RX ADMIN — SENNOSIDES AND DOCUSATE SODIUM 1 TABLET: 50; 8.6 TABLET ORAL at 09:04

## 2022-04-20 RX ADMIN — AMOXICILLIN AND CLAVULANATE POTASSIUM 1 TABLET: 875; 125 TABLET, FILM COATED ORAL at 01:04

## 2022-04-20 RX ADMIN — AMIODARONE HYDROCHLORIDE 200 MG: 200 TABLET ORAL at 08:04

## 2022-04-20 RX ADMIN — CLOPIDOGREL 75 MG: 75 TABLET, FILM COATED ORAL at 08:04

## 2022-04-20 RX ADMIN — INSULIN ASPART 4 UNITS: 100 INJECTION, SOLUTION INTRAVENOUS; SUBCUTANEOUS at 06:04

## 2022-04-20 RX ADMIN — ONDANSETRON 8 MG: 2 INJECTION INTRAMUSCULAR; INTRAVENOUS at 10:04

## 2022-04-20 RX ADMIN — METOPROLOL SUCCINATE 50 MG: 50 TABLET, EXTENDED RELEASE ORAL at 08:04

## 2022-04-20 RX ADMIN — ATORVASTATIN CALCIUM 40 MG: 20 TABLET, FILM COATED ORAL at 08:04

## 2022-04-20 NOTE — CHAPLAIN
REASON FOR VISIT:  General Rounding    SUBJECTIVE:    Patient is very concerned about going home,  He mentioned he did not want to go to [facility] because he is under the impression that his insurance will only pay a couple of days and then he will need to go home and he does not feel strong enough to go home.  Pt is also concerned about his wife who is also in the hospital on 9 the ninth floor.  Pt said he needs to be with his wife.    OBJECTIVE:    Patient was sitting up eating lunch when I arrived and continued eating while I was there.      ASSESSMENT:  Pt is concerned about his wife and about himself and who will care for him when he gets home.  His understanding is that he will be going home on Friday.      PROVIDED & PLAN:    I will continue to provide support and visit his wife as well.

## 2022-04-20 NOTE — SUBJECTIVE & OBJECTIVE
Interval History: No issues this morning. Glucose continues to be labile. No new complaints today other than weakness. He continues to have poor appetite. Hes on DM diet and DM precautions. Aox4. Endocrine following, titrating insulin. No complaints at this time. CT with cavitary lesion. Pulmonary consulted.     Review of Systems   Constitutional:  Positive for appetite change and weakness. Negative for fever.   HENT:  Negative for rhinorrhea and sore throat.    Respiratory:  Negative for cough and shortness of breath.    Cardiovascular:  Negative for chest pain and palpitations.   Gastrointestinal:  Negative for pain, N/V diarrhea.   Genitourinary:  Negative for difficulty urinating and hematuria.   Musculoskeletal:  Negative for back pain and neck pain.   Skin:  Negative for rash and wound.   Neurological:  Negative for tremors and headaches.   All other systems reviewed and are negative.    Objective:     Vital Signs (Most Recent):  Temp: 97.6 °F (36.4 °C) (04/20/22 1157)  Pulse: 66 (04/20/22 1157)  Resp: 18 (04/20/22 1157)  BP: (!) 127/57 (04/20/22 1157)  SpO2: (!) 94 % (04/20/22 1157)   Vital Signs (24h Range):  Temp:  [96.2 °F (35.7 °C)-97.8 °F (36.6 °C)] 97.6 °F (36.4 °C)  Pulse:  [57-68] 66  Resp:  [16-18] 18  SpO2:  [93 %-97 %] 94 %  BP: ()/(52-71) 127/57     Weight: 81.6 kg (179 lb 14.3 oz)  Body mass index is 25.09 kg/m².    Intake/Output Summary (Last 24 hours) at 4/20/2022 1408  Last data filed at 4/20/2022 1200  Gross per 24 hour   Intake 480 ml   Output --   Net 480 ml        Physical Exam  Vitals and nursing note reviewed.   Constitutional:       General: He is not in acute distress.     Appearance: Normal appearance. He is normal weight. He is not ill-appearing, toxic-appearing or diaphoretic.   HENT:      Head: Normocephalic and atraumatic.      Right Ear: External ear normal.      Left Ear: External ear normal.      Nose: Nose normal.   Cardiovascular:      Rate and Rhythm: Normal rate and  regular rhythm.      Pulses: Normal pulses.      Heart sounds: Normal heart sounds. No murmur heard.    No friction rub. No gallop.   Pulmonary:      Effort: Pulmonary effort is normal. No respiratory distress.      Breath sounds: Normal breath sounds. No wheezing, rhonchi or rales.   Abdominal:      General: Abdomen is flat. Bowel sounds are normal. There is no distension.      Palpations: Abdomen is soft.      Tenderness: There is no abdominal tenderness. There is no guarding or rebound.   Musculoskeletal:         General: No swelling. Normal range of motion.   Skin:     General: Skin is warm and dry.      Coloration: Skin is not jaundiced.   Neurological:      General: No focal deficit present.      Mental Status: He is alert and oriented to person, place, and time. Mental status is at baseline.   Psychiatric:         Mood and Affect: Mood normal.         Behavior: Behavior normal.         Thought Content: Thought content normal.         Judgment: Judgment normal.     Significant Labs: All pertinent labs within the past 24 hours have been reviewed.    Significant Imaging: I have reviewed all pertinent imaging results/findings within the past 24 hours.

## 2022-04-20 NOTE — SUBJECTIVE & OBJECTIVE
Past Medical History:   Diagnosis Date    Arthritis     Coronary artery disease     COVID-19 virus infection 2022    Diabetes mellitus type II     Embolic stroke involving left cerebellar artery 2022    Hyperlipidemia     Hypertension     Kidney stone     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 2012    STEMI involving right coronary artery 2022    Type II or unspecified type diabetes mellitus with neurological manifestations, uncontrolled(250.62) 3/8/2013    Urinary tract infection        Past Surgical History:   Procedure Laterality Date    BACK SURGERY      CATARACT EXTRACTION W/  INTRAOCULAR LENS IMPLANT Right     Per Dr Romero note 2018    COLONOSCOPY N/A 2019    Procedure: COLONOSCOPY Suprep;  Surgeon: Anh Johnson MD;  Location: [facility] ENDO;  Service: Endoscopy;  Laterality: N/A;    EYE SURGERY      HERNIA REPAIR      LEFT HEART CATHETERIZATION Left 2022    Procedure: CATHETERIZATION, HEART, LEFT;  Surgeon: Will Hurst III, MD;  Location: [facility] CATH LAB/EP;  Service: Cardiology;  Laterality: Left;    renal stones      SHOULDER OPEN ROTATOR CUFF REPAIR         Review of patient's allergies indicates:   Allergen Reactions    Iodine      Other reaction(s): swelling  Other reaction(s): Itching  Other reaction(s): Rash       Family History       Problem Relation (Age of Onset)    Diabetes Father          Tobacco Use    Smoking status: Former Smoker     Packs/day: 1.50     Years: 25.00     Pack years: 37.50     Quit date: 1983     Years since quittin.3    Smokeless tobacco: Never Used   Substance and Sexual Activity    Alcohol use: No    Drug use: No    Sexual activity: Yes     Partners: Female         Review of Systems   Constitutional:  Negative for appetite change, chills and fever.   HENT:  Negative for sore throat.    Respiratory:  Positive for cough (chronic). Negative for shortness of breath.    Cardiovascular:  Negative for chest pain and palpitations.    Gastrointestinal:  Negative for abdominal distention, abdominal pain, constipation, diarrhea, nausea and vomiting.   Endocrine: Negative for polydipsia and polyuria.   Genitourinary:  Negative for dysuria, hematuria and urgency.   Musculoskeletal:  Negative for back pain and gait problem.   Neurological:  Negative for weakness and numbness.   All other systems reviewed and are negative.  Objective:     Vital Signs (Most Recent):  Temp: 97.8 °F (36.6 °C) (04/20/22 0727)  Pulse: 61 (04/20/22 0727)  Resp: 18 (04/20/22 0727)  BP: 137/66 (04/20/22 0727)  SpO2: 95 % (04/20/22 0727) Vital Signs (24h Range):  Temp:  [96.2 °F (35.7 °C)-97.8 °F (36.6 °C)] 97.8 °F (36.6 °C)  Pulse:  [57-68] 61  Resp:  [16-18] 18  SpO2:  [93 %-97 %] 95 %  BP: ()/(52-71) 137/66     Weight: 81.6 kg (179 lb 14.3 oz)  Body mass index is 25.09 kg/m².      Intake/Output Summary (Last 24 hours) at 4/20/2022 0922  Last data filed at 4/19/2022 1400  Gross per 24 hour   Intake 120 ml   Output --   Net 120 ml       Physical Exam  Constitutional:       Appearance: Normal appearance.   HENT:      Head: Normocephalic and atraumatic.      Nose: No congestion or rhinorrhea.   Eyes:      General:         Right eye: No discharge.         Left eye: No discharge.   Cardiovascular:      Rate and Rhythm: Normal rate and regular rhythm.      Pulses: Normal pulses.      Heart sounds: Normal heart sounds.   Pulmonary:      Effort: Pulmonary effort is normal.      Breath sounds: Normal breath sounds.   Musculoskeletal:         General: No swelling, tenderness or deformity.      Cervical back: Normal range of motion. No muscular tenderness.   Skin:     General: Skin is warm and dry.   Neurological:      General: No focal deficit present.      Mental Status: He is alert and oriented to person, place, and time.   Psychiatric:         Mood and Affect: Mood normal.         Behavior: Behavior normal.       Vents:       Lines/Drains/Airways       Drain  Duration              Male External Urinary Catheter 04/11/22 2223 Medium 8 days              Peripheral Intravenous Line  Duration                  Peripheral IV - Single Lumen 04/11/22 2111 18 G Left Antecubital 8 days         Peripheral IV - Single Lumen 04/11/22 2337 22 G Right Hand 8 days         Peripheral IV - Single Lumen 04/11/22 2338 20 G Right Antecubital 8 days                    Significant Labs:    CBC/Anemia Profile:  Recent Labs   Lab 04/19/22  0233 04/20/22  0621   WBC 7.95 5.88   HGB 11.6* 11.5*   HCT 35.6* 35.5*    230   MCV 88 91   RDW 15.9* 16.2*        Chemistries:  Recent Labs   Lab 04/19/22  0233 04/20/22  0621    136   K 4.1 3.7    103   CO2 24 27   BUN 17 16   CREATININE 0.8 0.9   CALCIUM 8.1* 8.3*   ALBUMIN 2.5* 2.4*   PROT 5.6* 6.0   BILITOT 0.4 0.5   ALKPHOS 121 113   ALT 18 17   AST 23 21   MG 1.8 1.6   PHOS 3.3 3.8       All pertinent labs within the past 24 hours have been reviewed.    Significant Imaging:   I have reviewed all pertinent imaging results/findings within the past 24 hours.

## 2022-04-20 NOTE — HPI
"Mr. Kamar Muñoz is a 76 y.o. male with a past medical history of HTN, Type 2 DM, CAD, STEMI in January 2022 with LAUREN on DAPT, HLD, paroxysmal Afib on Eliquis, CVA, seizures and recurrent DKA. CT Chest without contrast obtained on 4/19 revealed "an evolving appearance of a right upper lobe posterior segment cavitary lesion with internal dependent mass; the cavity demonstrates a thinner wall and has decreased in diameter.  There is adjacent contracture of parenchyma.  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  The central debris is decreased in size,, now measuring 1.0 cm (axial series 4, image 81), previously 2.0 cm, and various other nodules". Of note, pt is a past smoker with at least 60 pack years. Quit about 30 years ago. He used to be a  for the city of [place]. Exerts weight loss, at least 24 lbs since January. Has been in DKA at least 3 times in the past 6 months. Denies night sweat, fever, chills. Denies homelessness. Has been in [facility] for a day or two but no prolong assisted time. Denies significant travel history. Follows with Pulmonology Dr. Louie outpatient. Currently on RA. Pulmonology consulted for cavitary nodules.   "

## 2022-04-20 NOTE — PROGRESS NOTES
[facility] - Telemetry Stepdown  Endocrinology  Progress Note    Admit Date: 4/11/2022     76 year old  male with T2DM, HTN, CAD, STEMI, HLD, paroxysmal Afib, CVA, seizures who presents for recurrent DKA.  He presented to [facility] ED on 4/11 with elevated blood glucose.  He was discharged from [facility] on 4/10 after being admitted for DKA on 4/5. Per family he was not feeling well after discharge to home and vomited several times on 4/11. Finger stick at home read High with unreadable blood glucose.  At this time daughter gave him 14 units of insulin and sublingual zofran. Other than malaise and nausea patient was not exhibiting any other signs/symptoms at home.    - In ER BG found to be 1015 with pCO2 6 and anion gap 35.  Hyperkalemic with K 7.0 and some EKG changes when compared to previous EKG.  Given calcium gluconate, and started on insulin gtt as well as subQ long acting insulin     - Endocrinology consulted for management of type 2 diabetes after resolution of DKA    - Spoke with daughter who states patient was discharged on 04/10/2022 with high glucose in the 400s which worsened after getting home and eating dinner; appropriate mealtime and correction insulin were given at that time. However, the next day patient's condition was worsening and they called EMS and his sugar was found to be in the thousands. She expresses concern that he cannot be care for adequately at home any longer and wishes to pursue skilled nursing facility.    Regarding Diabetes Mellitus:    Recurring episodes of DKA  Surgical Procedure and Date: NA  Diabetes diagnosis year: >20 years  Home Diabetes Medications:  During recent SNF was on Aspart 8 TID and glargine 8 units daily with sliding scale  How often checking glucose at home? >4 x day   Diabetes Complications include: Hyperglycemia, Hypoglycemia , and Diabetic peripheral neuropathy   Complicating diabetes co morbidities: History of CVA      Interval HPI:   none    /66 (BP  "Location: Left arm, Patient Position: Lying)   Pulse 61   Temp 97.8 °F (36.6 °C) (Axillary)   Resp 18   Ht 5' 11" (1.803 m)   Wt 81.6 kg (179 lb 14.3 oz)   SpO2 95%   BMI 25.09 kg/m²     Labs Reviewed and Include    Recent Labs   Lab 04/20/22  0621   GLU 99   CALCIUM 8.3*   ALBUMIN 2.4*   PROT 6.0      K 3.7   CO2 27      BUN 16   CREATININE 0.9   ALKPHOS 113   ALT 17   AST 21   BILITOT 0.5     Lab Results   Component Value Date    WBC 5.88 04/20/2022    HGB 11.5 (L) 04/20/2022    HCT 35.5 (L) 04/20/2022    MCV 91 04/20/2022     04/20/2022     No results for input(s): TSH, FREET4 in the last 168 hours.  Lab Results   Component Value Date    HGBA1C 9.7 (H) 04/05/2022       Nutritional status:   Body mass index is 25.09 kg/m².  Lab Results   Component Value Date    ALBUMIN 2.4 (L) 04/20/2022    ALBUMIN 2.5 (L) 04/19/2022    ALBUMIN 2.5 (L) 04/18/2022     No results found for: PREALBUMIN    Estimated Creatinine Clearance: 72 mL/min (based on SCr of 0.9 mg/dL).    Accu-Checks  Recent Labs     04/18/22  1810 04/18/22  2019 04/19/22  0729 04/19/22  0758 04/19/22  0906 04/19/22  1119 04/19/22  1538 04/19/22  1753 04/19/22  2201 04/20/22  0729   POCTGLUCOSE 240* 126* 57* 112* 93 83 149* 216* 141* 108       Current Medications and/or Treatments Impacting Glycemic Control  Immunotherapy:    Immunosuppressants       None          Steroids:   Hormones (From admission, onward)                Start     Stop Route Frequency Ordered    04/14/22 1047  melatonin tablet 6 mg  (Medication Panel)         -- Oral Nightly PRN 04/14/22 0948          Pressors:    Autonomic Drugs (From admission, onward)                None          Hyperglycemia/Diabetes Medications:   Antihyperglycemics (From admission, onward)                Start     Stop Route Frequency Ordered    04/19/22 2100  insulin detemir U-100 pen 8 Units         -- SubQ 2 times daily 04/19/22 0919    04/19/22 1130  insulin aspart U-100 pen 4-8 Units     "     -- SubQ 3 times daily with meals 22 0917    22 0111  insulin aspart U-100 pen 4 Units         -- SubQ With snacks 22 0012    22 0819  insulin aspart U-100 pen 1-10 Units         -- SubQ Every 6 hours PRN 22 0720            ASSESSMENT and PLAN    Ketosis-prone diabetes mellitus  Key History and Diagnostic Findings  - A1c of 9.7 on 2022 from 11.5 on 2022  - Home Regimen: Levemir 8 units daily, aspart 8 units TIDWM  - Weight based dosin kg x 0.5 = 41 TDD x 0.5 = 20 basal / 20 prandial  - 1700/TDD = 41 (estimated insulin sensitivity factor)  - 450/TDD = 11 (estimated starting carb ratio for prandial dosing)  - Glucose Goals: 160-200mg/dL  - 24 hour glucose trend: blood sugars tight in the morning    Plan  - decrease levemir to 7 units twice daily  - change aspart to 4-8 units TIDWM with moderate correction scale depending on his meal intake  - Hypoglycemia protocol in place  - If blood glucose greater than 300, please ask patient not to eat food or drink anything other than water until correctional insulin has brought it back below 250  - Please ensure patient receives their p.r.n. insulin aspart if they eat a snack with more than 30 g of carbohydrate  - Daughter wishes to pursue skilled nursing facility for discharge as she states he cannot be adequately taken care of at home    BRENDAN (acute kidney injury)  - Resolved, decreased renal function can cause decreased renal clearance and result in insulin stacking increasing risk of hypoglycemia        Modesto Abraham MD  Endocrinology  [facility]

## 2022-04-20 NOTE — SUBJECTIVE & OBJECTIVE
"Interval HPI:   none    /66 (BP Location: Left arm, Patient Position: Lying)   Pulse 61   Temp 97.8 °F (36.6 °C) (Axillary)   Resp 18   Ht 5' 11" (1.803 m)   Wt 81.6 kg (179 lb 14.3 oz)   SpO2 95%   BMI 25.09 kg/m²     Labs Reviewed and Include    Recent Labs   Lab 04/20/22  0621   GLU 99   CALCIUM 8.3*   ALBUMIN 2.4*   PROT 6.0      K 3.7   CO2 27      BUN 16   CREATININE 0.9   ALKPHOS 113   ALT 17   AST 21   BILITOT 0.5     Lab Results   Component Value Date    WBC 5.88 04/20/2022    HGB 11.5 (L) 04/20/2022    HCT 35.5 (L) 04/20/2022    MCV 91 04/20/2022     04/20/2022     No results for input(s): TSH, FREET4 in the last 168 hours.  Lab Results   Component Value Date    HGBA1C 9.7 (H) 04/05/2022       Nutritional status:   Body mass index is 25.09 kg/m².  Lab Results   Component Value Date    ALBUMIN 2.4 (L) 04/20/2022    ALBUMIN 2.5 (L) 04/19/2022    ALBUMIN 2.5 (L) 04/18/2022     No results found for: PREALBUMIN    Estimated Creatinine Clearance: 72 mL/min (based on SCr of 0.9 mg/dL).    Accu-Checks  Recent Labs     04/18/22  1810 04/18/22  2019 04/19/22  0729 04/19/22  0758 04/19/22  0906 04/19/22  1119 04/19/22  1538 04/19/22  1753 04/19/22  2201 04/20/22  0729   POCTGLUCOSE 240* 126* 57* 112* 93 83 149* 216* 141* 108       Current Medications and/or Treatments Impacting Glycemic Control  Immunotherapy:    Immunosuppressants       None          Steroids:   Hormones (From admission, onward)                Start     Stop Route Frequency Ordered    04/14/22 1047  melatonin tablet 6 mg  (Medication Panel)         -- Oral Nightly PRN 04/14/22 0948          Pressors:    Autonomic Drugs (From admission, onward)                None          Hyperglycemia/Diabetes Medications:   Antihyperglycemics (From admission, onward)                Start     Stop Route Frequency Ordered    04/19/22 2100  insulin detemir U-100 pen 8 Units         -- SubQ 2 times daily 04/19/22 0919    04/19/22 1130  " insulin aspart U-100 pen 4-8 Units         -- SubQ 3 times daily with meals 04/19/22 0917    04/17/22 0111  insulin aspart U-100 pen 4 Units         -- SubQ With snacks 04/17/22 0012    04/13/22 0819  insulin aspart U-100 pen 1-10 Units         -- SubQ Every 6 hours PRN 04/13/22 0720

## 2022-04-20 NOTE — ASSESSMENT & PLAN NOTE
- Resolved  - Unclear inciting etiology missed insulin doses vs sepsis.    - Recent admission 4/5 for DKA and discharged from [facility] 4/10.  Per family malaise since discharge with vomiting starting 4/10.  BG at home greater unreadable.  Given 14 units subQ insulin at that time.  In ER BG found to be 1015 with pCO2 6 and anion gap 35 and hyperkalemia.  Started on insulin gtt in ED and given 20 units subQ long acting insulin.    - Started on DKA protocol in the ICU, now on subq insulin  - Endocrine consulted, adjusting insulin  - Advanced diet   - ACHS, hypoglycemia protocol , SSI

## 2022-04-20 NOTE — ASSESSMENT & PLAN NOTE
"- As per CT, noted about a month ago  - Repeat CT Chest without contrast obtained on 4/19 revealed "an evolving appearance of a right upper lobe posterior segment cavitary lesion with internal dependent mass; the cavity demonstrates a thinner wall and has decreased in diameter.  There is adjacent contracture of parenchyma.  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  The central debris is decreased in size,, now measuring 1.0 cm (axial series 4, image 81), previously 2.0 cm, and various other nodules".   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 Louie outpatient.   - On RA    "

## 2022-04-20 NOTE — ASSESSMENT & PLAN NOTE
Key History and Diagnostic Findings  - A1c of 9.7 on 2022 from 11.5 on 2022  - Home Regimen: Levemir 8 units daily, aspart 8 units TIDWM  - Weight based dosin kg x 0.5 = 41 TDD x 0.5 = 20 basal / 20 prandial  - 1700/TDD = 41 (estimated insulin sensitivity factor)  - 450/TDD = 11 (estimated starting carb ratio for prandial dosing)  - Glucose Goals: 160-200mg/dL  - 24 hour glucose trend: blood sugars tight in the morning    Plan  - decrease levemir to 7 units twice daily  - change aspart to 4-8 units TIDWM with moderate correction scale depending on his meal intake  - Hypoglycemia protocol in place  - If blood glucose greater than 300, please ask patient not to eat food or drink anything other than water until correctional insulin has brought it back below 250  - Please ensure patient receives their p.r.n. insulin aspart if they eat a snack with more than 30 g of carbohydrate  - Daughter wishes to pursue skilled nursing facility for discharge as she states he cannot be adequately taken care of at home

## 2022-04-20 NOTE — PROGRESS NOTES
[facility]  Progress Note    Patient Name: Kamar Muñoz  MRN: [id number]  Patient Class: IP- Inpatient   Admission Date: 4/11/2022  Length of Stay: 9 days  Attending Physician: Siva Hassan MD  Primary Care Provider: Basim Guerrero MD        Subjective:     Principal Problem:Pulmonary cavitary lesion        HPI:  Mr. Kamar Muñoz is a 76 y.o. male with a past medical history of HTN, Type 2 DM, CAD, STEMI, HLD, paroxysmal Afib, CVA, seizures and recurrent DKA.  He presented to [facility] ED on 4/11 with elevated blood glucose.  He was discharged from [facility] on 4/10 after being admitted for DKA on 4/5.  At time of exam patient is alert but altered and unable to provide any history.  Spoke with patient's daughter who states she is a primary caregiver at home and obtained history as well as review of chart.  Per family he was not feeling well after discharge to home and vomited several times on 4/11. Unknown characteristics of emesis. Finger stick at home read High with unreadable blood glucose.  At this time daughter gave him 14 units of insulin and sublingual zofran. Other than malaise and nausea patient was not exhibiting any other signs/symptoms at home.  In ER BG found to be 1015 with pCO2 6 and anion gap 35.  Hyperkalemic with K 7.0 and some EKG changes when compared to previous EKG.  Given calcium gluconate, and started on insulin gtt as well as subQ long acting insulin.      Critical Care Medicine consulted for DKA and admitted to MICU.        Overview/Hospital Course:  Admitted to MICU on 4/11 for DKA with BG >1000, pCO2 6, AG 35, and hyperkalemia.  Given Calcium gluconate and started on insulin gtt in ED.  Hyperkalemia not improved on repeat labs and bolused with IV insulin.  Infectious workup sent and broad spectrum abx started.  4/12 potassium improving with insulin. 4/13 Gap closed, insulin gtt turned off and switched to subq insulin. Endocrine consulted for  insulin mgmt. He was stepped down to  4/14.    Since step down stable. Advanced diet. Endocrine following and titrating insulin. Poor po intake making it difficult to adjust insulin. CT chest with cavitary lesion, pulmonary consulted. SNF once medically stable for dc.      Interval History: No issues this morning. Glucose continues to be labile. No new complaints today other than weakness. He continues to have poor appetite. Hes on DM diet and DM precautions. Aox4. Endocrine following, titrating insulin. No complaints at this time. CT with cavitary lesion. Pulmonary consulted.     Review of Systems   Constitutional:  Positive for appetite change and weakness. Negative for fever.   HENT:  Negative for rhinorrhea and sore throat.    Respiratory:  Negative for cough and shortness of breath.    Cardiovascular:  Negative for chest pain and palpitations.   Gastrointestinal:  Negative for pain, N/V diarrhea.   Genitourinary:  Negative for difficulty urinating and hematuria.   Musculoskeletal:  Negative for back pain and neck pain.   Skin:  Negative for rash and wound.   Neurological:  Negative for tremors and headaches.   All other systems reviewed and are negative.    Objective:     Vital Signs (Most Recent):  Temp: 97.6 °F (36.4 °C) (04/20/22 1157)  Pulse: 66 (04/20/22 1157)  Resp: 18 (04/20/22 1157)  BP: (!) 127/57 (04/20/22 1157)  SpO2: (!) 94 % (04/20/22 1157)   Vital Signs (24h Range):  Temp:  [96.2 °F (35.7 °C)-97.8 °F (36.6 °C)] 97.6 °F (36.4 °C)  Pulse:  [57-68] 66  Resp:  [16-18] 18  SpO2:  [93 %-97 %] 94 %  BP: ()/(52-71) 127/57     Weight: 81.6 kg (179 lb 14.3 oz)  Body mass index is 25.09 kg/m².    Intake/Output Summary (Last 24 hours) at 4/20/2022 1408  Last data filed at 4/20/2022 1200  Gross per 24 hour   Intake 480 ml   Output --   Net 480 ml        Physical Exam  Vitals and nursing note reviewed.   Constitutional:       General: He is not in acute distress.     Appearance: Normal appearance. He is  "normal weight. He is not ill-appearing, toxic-appearing or diaphoretic.   HENT:      Head: Normocephalic and atraumatic.      Right Ear: External ear normal.      Left Ear: External ear normal.      Nose: Nose normal.   Cardiovascular:      Rate and Rhythm: Normal rate and regular rhythm.      Pulses: Normal pulses.      Heart sounds: Normal heart sounds. No murmur heard.    No friction rub. No gallop.   Pulmonary:      Effort: Pulmonary effort is normal. No respiratory distress.      Breath sounds: Normal breath sounds. No wheezing, rhonchi or rales.   Abdominal:      General: Abdomen is flat. Bowel sounds are normal. There is no distension.      Palpations: Abdomen is soft.      Tenderness: There is no abdominal tenderness. There is no guarding or rebound.   Musculoskeletal:         General: No swelling. Normal range of motion.   Skin:     General: Skin is warm and dry.      Coloration: Skin is not jaundiced.   Neurological:      General: No focal deficit present.      Mental Status: He is alert and oriented to person, place, and time. Mental status is at baseline.   Psychiatric:         Mood and Affect: Mood normal.         Behavior: Behavior normal.         Thought Content: Thought content normal.         Judgment: Judgment normal.     Significant Labs: All pertinent labs within the past 24 hours have been reviewed.    Significant Imaging: I have reviewed all pertinent imaging results/findings within the past 24 hours.      Assessment/Plan:      * Pulmonary cavitary lesion  - As per CT, noted about a month ago  - Repeat CT Chest without contrast obtained on 4/19 revealed "an evolving appearance of a right upper lobe posterior segment cavitary lesion with internal dependent mass; the cavity demonstrates a thinner wall and has decreased in diameter.  There is adjacent contracture of parenchyma.  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  The central debris is decreased in size,, now " "measuring 1.0 cm (axial series 4, image 81), previously 2.0 cm, and various other nodules".   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 Louie outpatient.   - On RA      Lactic acidosis  - LA 10.3 on hospital admission.  Concern for infectious process given WBC on admission 17.05, on 4/10 prior to discharge WBC 6.82. CXR with bilateral interstitial pattern, no focal areas of consolidation.    - Procalcitonin 1.18  - Blood cultures NGTD  - Antibiotics discontinued in the ICU  - Low suspicion for any infectious etiology , likely from DKA       Goals of care, counseling/discussion  - DNR as per ICU    Severe malnutrition  Nutrition consulted. Most recent weight and BMI monitored-          Malnutrition (Moderate to Severe)  Weight Loss (Malnutrition): greater than 7.5% in 3 months  Energy Intake (Malnutrition): less than 75% for greater than or equal to 1 month              Measurements:  Wt Readings from Last 1 Encounters:   04/19/22 81.6 kg (179 lb 14.3 oz)   Body mass index is 25.09 kg/m².    Recommendations: Recommendation/Intervention: 1. Continue current Diabetic diet + ONS. 2. RD to monitor & follow-up.  Goals: Meet % EEN, EPN by RD f/u date    Patient has been screened and assessed by RD. RD will follow patient.    Ketosis-prone diabetes mellitus  - see DKA      Focal " seizures  - History of seizures on lacosamide.    - Continue home lacosamide    Coronary artery disease  - Stable  - Continue home regimen of aspirin, atorvastatin, clopidogrel, losartan, and metoprolol      Paroxysmal atrial fibrillation  - History of afib.    - Continue home regimen of amiodarone, apixaban, and metoprolol        BRENDAN (acute kidney injury)  - Resolved  - Likely secondary to dehydration in the setting of DKA.  Baseline Cr per chart review ~1.0.  Cr on admission 2.3.     Essential hypertension  - resume home meds as able        VTE Risk Mitigation (From admission, onward)         Ordered     apixaban tablet 5 mg  2 times daily         04/20/22 1209     IP VTE HIGH RISK PATIENT  Once         04/11/22 2342     Place sequential compression device  Until discontinued         04/11/22 2138                Discharge Planning   ALLIE: 4/22/2022     Code Status: DNR   Is the patient medically ready for discharge?: No    Reason for patient still in hospital (select all that apply): Patient trending condition  Discharge Plan A: Skilled Nursing Facility   Discharge Delays: None known at this time      Siva Hassan MD  Department of Hospital Medicine   [facility] - Telemetry Stepdown

## 2022-04-20 NOTE — ASSESSMENT & PLAN NOTE
"Mr. Kamar Muñoz is a 76 y.o. male with a past medical history of HTN, Type 2 DM, CAD, STEMI in January 2022 with LAUREN on DAPT, HLD, paroxysmal Afib on Eliquis, CVA, seizures and recurrent DKA. CT Chest without contrast obtained on 4/19 revealed "an evolving appearance of a right upper lobe posterior segment cavitary lesion with internal dependent mass; the cavity demonstrates a thinner wall and has decreased in diameter.  There is adjacent contracture of parenchyma.  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  The central debris is decreased in size,, now measuring 1.0 cm (axial series 4, image 81), previously 2.0 cm, and various other nodules". Of note, pt is a past smoker with at least 60 pack years. Quit about 30 years ago. He used to be a  for the city of [place]. Exerts weight loss, at least 24 lbs since January. Has been in DKA at least 3 times in the past 6 months. Denies night sweat, fever, chills. Denies homelessness. Has been in [facility] for a day or two but no prolong nursing home time. Denies significant travel history. Currently on RA. Pulmonology consulted for cavitary nodules.     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   1/11/2022 Culture with MAC pending susceptibilities     Differential includes but not limited to chronic MAC infection vs malignancy vs aspiration     Recommendations:  - Augmentin x 7 days   - Induced sputum and send for cultures, and AFB   - Recommend ID consult to determine if treatment is indicated for the MAC infection vs serial imaging to monitor given pt is asymptomatic from a pulmonary standpoint, and the nodule has decreased in size without any intervention. Though, he does have significant weight loss, which also likely from either chronic MAC infection vs malignancy vs several bouts of DKA   - Overall, pt is likely a poor candidate for surgical intervention given he is on triple therapy (eliquis + DAPT) for both afib and recent STEMI " with LAUREN stent placed in January   - No indication for bronchoscopy at this time  - Can continue follow up with Dr. Louie outpatient.

## 2022-04-20 NOTE — PT/OT/SLP PROGRESS
Physical Therapy      Patient Name:  Kamar Muñoz   MRN:  [id number]    Patient not seen today secondary to Patient unwilling to participate. Pt frustrated and reports that [facility], and niow he has to make calls to figure out where to go and therefore does not have time for therapy on this date. Will follow-up 4/21/22.

## 2022-04-20 NOTE — PLAN OF CARE
Problem: Adult Inpatient Plan of Care  Goal: Plan of Care Review  Outcome: Ongoing, Progressing  Goal: Patient-Specific Goal (Individualized)  Outcome: Ongoing, Progressing  Goal: Absence of Hospital-Acquired Illness or Injury  Outcome: Ongoing, Progressing  Goal: Optimal Comfort and Wellbeing  Outcome: Ongoing, Progressing  Goal: Readiness for Transition of Care  Outcome: Ongoing, Progressing     Problem: Diabetes Comorbidity  Goal: Blood Glucose Level Within Targeted Range  Outcome: Ongoing, Progressing     Problem: Fluid and Electrolyte Imbalance (Acute Kidney Injury/Impairment)  Goal: Fluid and Electrolyte Balance  Outcome: Ongoing, Progressing     Problem: Oral Intake Inadequate (Acute Kidney Injury/Impairment)  Goal: Optimal Nutrition Intake  Outcome: Ongoing, Progressing     Problem: Renal Function Impairment (Acute Kidney Injury/Impairment)  Goal: Effective Renal Function  Outcome: Ongoing, Progressing     Problem: Impaired Wound Healing  Goal: Optimal Wound Healing  Outcome: Ongoing, Progressing     Problem: Skin Injury Risk Increased  Goal: Skin Health and Integrity  Outcome: Ongoing, Progressing     Problem: Diabetic Ketoacidosis  Goal: Fluid and Electrolyte Balance with Absence of Ketosis  Outcome: Ongoing, Progressing     Problem: Fall Injury Risk  Goal: Absence of Fall and Fall-Related Injury  Outcome: Ongoing, Progressing     Pt AAOx4, VS WNL, on room air, able to ambulate with a walker. Stated no complaints throughout the shift. Likely to be discharged to a SNF. No s/s of distress noted at this time.

## 2022-04-20 NOTE — ASSESSMENT & PLAN NOTE
Nutrition consulted. Most recent weight and BMI monitored-          Malnutrition (Moderate to Severe)  Weight Loss (Malnutrition): greater than 7.5% in 3 months  Energy Intake (Malnutrition): less than 75% for greater than or equal to 1 month              Measurements:  Wt Readings from Last 1 Encounters:   04/19/22 81.6 kg (179 lb 14.3 oz)   Body mass index is 25.09 kg/m².    Recommendations: Recommendation/Intervention: 1. Continue current Diabetic diet + ONS. 2. RD to monitor & follow-up.  Goals: Meet % EEN, EPN by RD f/u date    Patient has been screened and assessed by RD. RD will follow patient.

## 2022-04-20 NOTE — CONSULTS
"[facility] - Telemetry Stepdown  Pulmonology  Consult Note    Patient Name: Kamar Muñoz  MRN: [id number]  Admission Date: 4/11/2022  Hospital Length of Stay: 9 days  Code Status: DNR  Attending Physician: Siva Hassan MD  Primary Care Provider: Basim Guerrero MD   Principal Problem: Diabetic ketoacidosis without coma associated with type 2 diabetes mellitus    Inpatient consult to Pulmonology  Consult performed by: Aby Kaplan DO  Consult ordered by: Siva Hassan MD        Subjective:     HPI:  Mr. Kamar Muñoz is a 76 y.o. male with a past medical history of HTN, Type 2 DM, CAD, STEMI in January 2022 with LAUREN on DAPT, HLD, paroxysmal Afib on Eliquis, CVA, seizures and recurrent DKA. CT Chest without contrast obtained on 4/19 revealed "an evolving appearance of a right upper lobe posterior segment cavitary lesion with internal dependent mass; the cavity demonstrates a thinner wall and has decreased in diameter.  There is adjacent contracture of parenchyma.  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  The central debris is decreased in size,, now measuring 1.0 cm (axial series 4, image 81), previously 2.0 cm, and various other nodules". Of note, pt is a past smoker with at least 60 pack years. Quit about 30 years ago. He used to be a  for the city of [place]. Exerts weight loss, at least 24 lbs since January. Has been in DKA at least 3 times in the past 6 months. Denies night sweat, fever, chills. Denies homelessness. Has been in group home for a day or two but no prolong correction time. Denies significant travel history. Follows with Pulmonology Dr. Louie outpatient. Currently on RA. Pulmonology consulted for cavitary nodules.       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    Hyperlipidemia     " Hypertension     Kidney stone     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 2012    STEMI involving right coronary artery 2022    Type II or unspecified type diabetes mellitus with neurological manifestations, uncontrolled(250.62) 3/8/2013    Urinary tract infection        Past Surgical History:   Procedure Laterality Date    BACK SURGERY      CATARACT EXTRACTION W/  INTRAOCULAR LENS IMPLANT Right     Per Dr Romero note 2018    COLONOSCOPY N/A 2019    Procedure: COLONOSCOPY Suprep;  Surgeon: Anh Johnson MD;  Location: [facility] ENDO;  Service: Endoscopy;  Laterality: N/A;    EYE SURGERY      HERNIA REPAIR      LEFT HEART CATHETERIZATION Left 2022    Procedure: CATHETERIZATION, HEART, LEFT;  Surgeon: Will Hurst III, MD;  Location: [facility] CATH LAB/EP;  Service: Cardiology;  Laterality: Left;    renal stones      SHOULDER OPEN ROTATOR CUFF REPAIR         Review of patient's allergies indicates:   Allergen Reactions    Iodine      Other reaction(s): swelling  Other reaction(s): Itching  Other reaction(s): Rash       Family History       Problem Relation (Age of Onset)    Diabetes Father          Tobacco Use    Smoking status: Former Smoker     Packs/day: 1.50     Years: 25.00     Pack years: 37.50     Quit date: 1983     Years since quittin.3    Smokeless tobacco: Never Used   Substance and Sexual Activity    Alcohol use: No    Drug use: No    Sexual activity: Yes     Partners: Female         Review of Systems   Constitutional:  Negative for appetite change, chills and fever.   HENT:  Negative for sore throat.    Respiratory:  Positive for cough (chronic). Negative for shortness of breath.    Cardiovascular:  Negative for chest pain and palpitations.   Gastrointestinal:  Negative for abdominal distention, abdominal pain, constipation, diarrhea, nausea and vomiting.   Endocrine: Negative for polydipsia and polyuria.   Genitourinary:  Negative for dysuria, hematuria and  urgency.   Musculoskeletal:  Negative for back pain and gait problem.   Neurological:  Negative for weakness and numbness.   All other systems reviewed and are negative.  Objective:     Vital Signs (Most Recent):  Temp: 97.8 °F (36.6 °C) (04/20/22 0727)  Pulse: 61 (04/20/22 0727)  Resp: 18 (04/20/22 0727)  BP: 137/66 (04/20/22 0727)  SpO2: 95 % (04/20/22 0727) Vital Signs (24h Range):  Temp:  [96.2 °F (35.7 °C)-97.8 °F (36.6 °C)] 97.8 °F (36.6 °C)  Pulse:  [57-68] 61  Resp:  [16-18] 18  SpO2:  [93 %-97 %] 95 %  BP: ()/(52-71) 137/66     Weight: 81.6 kg (179 lb 14.3 oz)  Body mass index is 25.09 kg/m².      Intake/Output Summary (Last 24 hours) at 4/20/2022 0922  Last data filed at 4/19/2022 1400  Gross per 24 hour   Intake 120 ml   Output --   Net 120 ml       Physical Exam  Constitutional:       Appearance: Normal appearance.   HENT:      Head: Normocephalic and atraumatic.      Nose: No congestion or rhinorrhea.   Eyes:      General:         Right eye: No discharge.         Left eye: No discharge.   Cardiovascular:      Rate and Rhythm: Normal rate and regular rhythm.      Pulses: Normal pulses.      Heart sounds: Normal heart sounds.   Pulmonary:      Effort: Pulmonary effort is normal.      Breath sounds: Normal breath sounds.   Musculoskeletal:         General: No swelling, tenderness or deformity.      Cervical back: Normal range of motion. No muscular tenderness.   Skin:     General: Skin is warm and dry.   Neurological:      General: No focal deficit present.      Mental Status: He is alert and oriented to person, place, and time.   Psychiatric:         Mood and Affect: Mood normal.         Behavior: Behavior normal.       Vents:       Lines/Drains/Airways       Drain  Duration             Male External Urinary Catheter 04/11/22 2223 Medium 8 days              Peripheral Intravenous Line  Duration                  Peripheral IV - Single Lumen 04/11/22 2111 18 G Left Antecubital 8 days          "Peripheral IV - Single Lumen 04/11/22 2337 22 G Right Hand 8 days         Peripheral IV - Single Lumen 04/11/22 2338 20 G Right Antecubital 8 days                    Significant Labs:    CBC/Anemia Profile:  Recent Labs   Lab 04/19/22  0233 04/20/22  0621   WBC 7.95 5.88   HGB 11.6* 11.5*   HCT 35.6* 35.5*    230   MCV 88 91   RDW 15.9* 16.2*        Chemistries:  Recent Labs   Lab 04/19/22  0233 04/20/22  0621    136   K 4.1 3.7    103   CO2 24 27   BUN 17 16   CREATININE 0.8 0.9   CALCIUM 8.1* 8.3*   ALBUMIN 2.5* 2.4*   PROT 5.6* 6.0   BILITOT 0.4 0.5   ALKPHOS 121 113   ALT 18 17   AST 23 21   MG 1.8 1.6   PHOS 3.3 3.8       All pertinent labs within the past 24 hours have been reviewed.    Significant Imaging:   I have reviewed all pertinent imaging results/findings within the past 24 hours.    Assessment/Plan:     Pulmonary cavitary lesion  Mr. Kamar Muñoz is a 76 y.o. male with a past medical history of HTN, Type 2 DM, CAD, STEMI in January 2022 with LAUREN on DAPT, HLD, paroxysmal Afib on Eliquis, CVA, seizures and recurrent DKA. CT Chest without contrast obtained on 4/19 revealed "an evolving appearance of a right upper lobe posterior segment cavitary lesion with internal dependent mass; the cavity demonstrates a thinner wall and has decreased in diameter.  There is adjacent contracture of parenchyma.  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  The central debris is decreased in size,, now measuring 1.0 cm (axial series 4, image 81), previously 2.0 cm, and various other nodules". Of note, pt is a past smoker with at least 60 pack years. Quit about 30 years ago. He used to be a  for the city of [place]. Exerts weight loss, at least 24 lbs since January. Has been in DKA at least 3 times in the past 6 months. Denies night sweat, fever, chills. Denies homelessness. Has been in detention for a day or two but no prolong [facility] time. Denies significant travel " history. Currently on RA. Pulmonology consulted for cavitary nodules.     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   1/11/2022 Culture with MAC pending susceptibilities     Differential includes but not limited to chronic MAC infection vs malignancy (the internal dependent mass is concerning for this and significant social hx) vs aspiration     Recommendations:  - Augmentin x 7 days   - Induced sputum and send for cultures, and AFB   - Recommend ID consult to determine if treatment is indicated for the MAC infection vs serial imaging to monitor given pt is asymptomatic from a pulmonary standpoint, and the nodule has decreased in size without any intervention. Though, he does have significant weight loss, which also likely from either chronic MAC infection vs malignancy vs several bouts of DKA   - Overall, pt is likely a poor candidate for surgical intervention given he is on triple therapy (eliquis + DAPT) for both afib and recent STEMI with LAUREN stent placed in January   - Repeat Chest CT in one year  - No indication for bronchoscopy at this time  - Can continue follow up with Dr. Louie outpatient.              Thank you for your consult. I will follow-up with patient. Please contact us if you have any additional questions.     Aby Kaplan, DO  Pulmonology  [facility] - Tele[facility] Stepdown

## 2022-04-20 NOTE — PLAN OF CARE
JASIEL met with pt to discuss discharge plan.  Pt advised SW he met with staff (Zana and Cindy) from [facility] and was advised they would only be able to admit him for a few days.  Therefore, he is not sure if he will be going to [facility].  Pt advised SW to speak with his son, Kamra regarding his discharge plan.      SW spoke to pt's son, Kamar [phone], to discuss discharge plan.  Kamar advised SW he and his siblings will discuss d/c plan with father.  Pt's son expressed concerns about pt going home and does not feel as though pt nor his wife will be able to take care of themselves at home.  Pt and family interested in [facility] but will continue to research other facilities.      SW provided pt with updated referral list and inform him referrals were sent to multiple facilities.  JASIEL will follow up with pt's son and daughter tomorrow to discuss further.    Oliva Lorenzo LMSW  PRN-  [facility]  Ext. [phone]

## 2022-04-21 PROBLEM — E87.20 LACTIC ACIDOSIS: Status: RESOLVED | Noted: 2022-04-12 | Resolved: 2022-04-21

## 2022-04-21 LAB
1,3 BETA GLUCAN SER-MCNC: <31 PG/ML
ALBUMIN SERPL BCP-MCNC: 2.4 G/DL (ref 3.5–5.2)
ALP SERPL-CCNC: 116 U/L (ref 55–135)
ALT SERPL W/O P-5'-P-CCNC: 22 U/L (ref 10–44)
ANION GAP SERPL CALC-SCNC: 9 MMOL/L (ref 8–16)
AST SERPL-CCNC: 37 U/L (ref 10–40)
BASOPHILS # BLD AUTO: 0.03 K/UL (ref 0–0.2)
BASOPHILS NFR BLD: 0.6 % (ref 0–1.9)
BILIRUB SERPL-MCNC: 0.4 MG/DL (ref 0.1–1)
BUN SERPL-MCNC: 16 MG/DL (ref 8–23)
CALCIUM SERPL-MCNC: 8.5 MG/DL (ref 8.7–10.5)
CHLORIDE SERPL-SCNC: 100 MMOL/L (ref 95–110)
CO2 SERPL-SCNC: 25 MMOL/L (ref 23–29)
CREAT SERPL-MCNC: 0.8 MG/DL (ref 0.5–1.4)
DIFFERENTIAL METHOD: ABNORMAL
EOSINOPHIL # BLD AUTO: 0.4 K/UL (ref 0–0.5)
EOSINOPHIL NFR BLD: 8.9 % (ref 0–8)
ERYTHROCYTE [DISTWIDTH] IN BLOOD BY AUTOMATED COUNT: 16 % (ref 11.5–14.5)
EST. GFR  (AFRICAN AMERICAN): >60 ML/MIN/1.73 M^2
EST. GFR  (NON AFRICAN AMERICAN): >60 ML/MIN/1.73 M^2
FUNGITELL COMMENTS: NEGATIVE
GALACTOMANNAN AG SERPL IA-ACNC: <0.5 INDEX
GLUCOSE SERPL-MCNC: 134 MG/DL (ref 70–110)
HCT VFR BLD AUTO: 36 % (ref 40–54)
HGB BLD-MCNC: 11.8 G/DL (ref 14–18)
IMM GRANULOCYTES # BLD AUTO: 0.02 K/UL (ref 0–0.04)
IMM GRANULOCYTES NFR BLD AUTO: 0.4 % (ref 0–0.5)
LYMPHOCYTES # BLD AUTO: 1.4 K/UL (ref 1–4.8)
LYMPHOCYTES NFR BLD: 28.2 % (ref 18–48)
MAGNESIUM SERPL-MCNC: 1.5 MG/DL (ref 1.6–2.6)
MCH RBC QN AUTO: 29.1 PG (ref 27–31)
MCHC RBC AUTO-ENTMCNC: 32.8 G/DL (ref 32–36)
MCV RBC AUTO: 89 FL (ref 82–98)
MONOCYTES # BLD AUTO: 0.7 K/UL (ref 0.3–1)
MONOCYTES NFR BLD: 13.5 % (ref 4–15)
NEUTROPHILS # BLD AUTO: 2.3 K/UL (ref 1.8–7.7)
NEUTROPHILS NFR BLD: 48.4 % (ref 38–73)
NRBC BLD-RTO: 0 /100 WBC
PHOSPHATE SERPL-MCNC: 3.1 MG/DL (ref 2.7–4.5)
PLATELET # BLD AUTO: 210 K/UL (ref 150–450)
PMV BLD AUTO: 9.3 FL (ref 9.2–12.9)
POCT GLUCOSE: 155 MG/DL (ref 70–110)
POCT GLUCOSE: 184 MG/DL (ref 70–110)
POCT GLUCOSE: 192 MG/DL (ref 70–110)
POCT GLUCOSE: 237 MG/DL (ref 70–110)
POCT GLUCOSE: 279 MG/DL (ref 70–110)
POCT GLUCOSE: 283 MG/DL (ref 70–110)
POCT GLUCOSE: 300 MG/DL (ref 70–110)
POTASSIUM SERPL-SCNC: 3.8 MMOL/L (ref 3.5–5.1)
PROT SERPL-MCNC: 5.9 G/DL (ref 6–8.4)
RBC # BLD AUTO: 4.06 M/UL (ref 4.6–6.2)
SODIUM SERPL-SCNC: 134 MMOL/L (ref 136–145)
WBC # BLD AUTO: 4.82 K/UL (ref 3.9–12.7)

## 2022-04-21 PROCEDURE — 25000003 PHARM REV CODE 250: Performed by: INTERNAL MEDICINE

## 2022-04-21 PROCEDURE — 63600175 PHARM REV CODE 636 W HCPCS: Performed by: STUDENT IN AN ORGANIZED HEALTH CARE EDUCATION/TRAINING PROGRAM

## 2022-04-21 PROCEDURE — 99232 PR SUBSEQUENT HOSPITAL CARE,LEVL II: ICD-10-PCS | Mod: GC,,, | Performed by: INTERNAL MEDICINE

## 2022-04-21 PROCEDURE — 99233 PR SUBSEQUENT HOSPITAL CARE,LEVL III: ICD-10-PCS | Mod: ,,, | Performed by: STUDENT IN AN ORGANIZED HEALTH CARE EDUCATION/TRAINING PROGRAM

## 2022-04-21 PROCEDURE — 36415 COLL VENOUS BLD VENIPUNCTURE: CPT | Performed by: INTERNAL MEDICINE

## 2022-04-21 PROCEDURE — 83735 ASSAY OF MAGNESIUM: CPT | Performed by: INTERNAL MEDICINE

## 2022-04-21 PROCEDURE — 85025 COMPLETE CBC W/AUTO DIFF WBC: CPT | Performed by: INTERNAL MEDICINE

## 2022-04-21 PROCEDURE — 99233 SBSQ HOSP IP/OBS HIGH 50: CPT | Mod: ,,, | Performed by: STUDENT IN AN ORGANIZED HEALTH CARE EDUCATION/TRAINING PROGRAM

## 2022-04-21 PROCEDURE — 97116 GAIT TRAINING THERAPY: CPT

## 2022-04-21 PROCEDURE — 97530 THERAPEUTIC ACTIVITIES: CPT

## 2022-04-21 PROCEDURE — 84100 ASSAY OF PHOSPHORUS: CPT | Performed by: INTERNAL MEDICINE

## 2022-04-21 PROCEDURE — 80053 COMPREHEN METABOLIC PANEL: CPT | Performed by: INTERNAL MEDICINE

## 2022-04-21 PROCEDURE — 99232 SBSQ HOSP IP/OBS MODERATE 35: CPT | Mod: GC,,, | Performed by: INTERNAL MEDICINE

## 2022-04-21 PROCEDURE — 97535 SELF CARE MNGMENT TRAINING: CPT

## 2022-04-21 PROCEDURE — 20600001 HC STEP DOWN PRIVATE ROOM

## 2022-04-21 RX ORDER — MAGNESIUM SULFATE HEPTAHYDRATE 40 MG/ML
4 INJECTION, SOLUTION INTRAVENOUS ONCE
Status: DISCONTINUED | OUTPATIENT
Start: 2022-04-21 | End: 2022-04-21

## 2022-04-21 RX ORDER — INSULIN ASPART 100 [IU]/ML
8-12 INJECTION, SOLUTION INTRAVENOUS; SUBCUTANEOUS
Status: DISCONTINUED | OUTPATIENT
Start: 2022-04-21 | End: 2022-04-23

## 2022-04-21 RX ORDER — MAGNESIUM SULFATE HEPTAHYDRATE 40 MG/ML
2 INJECTION, SOLUTION INTRAVENOUS
Status: COMPLETED | OUTPATIENT
Start: 2022-04-21 | End: 2022-04-21

## 2022-04-21 RX ADMIN — POLYETHYLENE GLYCOL 3350 17 G: 17 POWDER, FOR SOLUTION ORAL at 10:04

## 2022-04-21 RX ADMIN — ATORVASTATIN CALCIUM 40 MG: 20 TABLET, FILM COATED ORAL at 09:04

## 2022-04-21 RX ADMIN — CLOPIDOGREL 75 MG: 75 TABLET, FILM COATED ORAL at 09:04

## 2022-04-21 RX ADMIN — LACOSAMIDE 100 MG: 100 TABLET, FILM COATED ORAL at 09:04

## 2022-04-21 RX ADMIN — INSULIN ASPART 6 UNITS: 100 INJECTION, SOLUTION INTRAVENOUS; SUBCUTANEOUS at 07:04

## 2022-04-21 RX ADMIN — MAGNESIUM SULFATE IN WATER 2 G: 40 INJECTION, SOLUTION INTRAVENOUS at 10:04

## 2022-04-21 RX ADMIN — METOPROLOL SUCCINATE 50 MG: 50 TABLET, EXTENDED RELEASE ORAL at 09:04

## 2022-04-21 RX ADMIN — TAMSULOSIN HYDROCHLORIDE 0.4 MG: 0.4 CAPSULE ORAL at 09:04

## 2022-04-21 RX ADMIN — PANTOPRAZOLE SODIUM 40 MG: 40 TABLET, DELAYED RELEASE ORAL at 09:04

## 2022-04-21 RX ADMIN — INSULIN ASPART 1 UNITS: 100 INJECTION, SOLUTION INTRAVENOUS; SUBCUTANEOUS at 09:04

## 2022-04-21 RX ADMIN — INSULIN ASPART 10 UNITS: 100 INJECTION, SOLUTION INTRAVENOUS; SUBCUTANEOUS at 11:04

## 2022-04-21 RX ADMIN — AMOXICILLIN AND CLAVULANATE POTASSIUM 1 TABLET: 875; 125 TABLET, FILM COATED ORAL at 09:04

## 2022-04-21 RX ADMIN — INSULIN ASPART 8 UNITS: 100 INJECTION, SOLUTION INTRAVENOUS; SUBCUTANEOUS at 04:04

## 2022-04-21 RX ADMIN — SENNOSIDES AND DOCUSATE SODIUM 1 TABLET: 50; 8.6 TABLET ORAL at 09:04

## 2022-04-21 RX ADMIN — AMIODARONE HYDROCHLORIDE 200 MG: 200 TABLET ORAL at 09:04

## 2022-04-21 RX ADMIN — MAGNESIUM SULFATE IN WATER 2 G: 40 INJECTION, SOLUTION INTRAVENOUS at 12:04

## 2022-04-21 RX ADMIN — APIXABAN 5 MG: 5 TABLET, FILM COATED ORAL at 09:04

## 2022-04-21 NOTE — ASSESSMENT & PLAN NOTE
76 y.o. M w hx of HTN, T2DM, CAD, STEMI, HLD, pAfib, CVA, seizures and recent admission for DKA 4/5-4/10 was readmitted 4/11 for DKA. CT revealed RUL cavitary lesion, b/l nodules and new GGO Rt lung. Last seen by pulm outpatient on 12/2021 and there was concern for met disease, MAC and aspergillus. Afb sputum cx grew m gordonae (12/26/21) and MAC (1/11/22). Aspergillus antigen and fungitell were negative on 1/25/22. Currently on day 3 for possible aspiration pna. Denies sob, cough, fevers or chills. Notes unintentional weight loss.     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    Currently does not meet criteria for pulm NTM (needs at minimum 1 positive afb cx from BAL OR 2 positive afb sputum cx).    Recommendations:  --if fungal serologies suggestive of aspergillus would recommend a trial of antifungal therapy especially since patient likely a poor candidate for surgical resection.     --agree with repeating afb sputum culture (please collect prior to discharge)     --if patient wishes to continue to f/u at [facility] would refer to NTM clinic at [facility] with Dr. Herrera for ID follow-up. Otherwise, he can f/u with ID here at [facility].

## 2022-04-21 NOTE — CONSULTS
[facility] - Tele[facility]down  Infectious Disease  Consult Note    Patient Name: Kamar Muñoz  MRN: [id number]  Admission Date: 4/11/2022  Hospital Length of Stay: 10 days  Attending Physician: Silas Singer MD  Primary Care Provider: Basim Guerrero MD     Isolation Status: No active isolations    Patient information was obtained from patient and ER records.      Consults  Assessment/Plan:     * Pulmonary cavitary lesion  76 y.o. M w hx of HTN, T2DM, CAD, STEMI, HLD, pAfib, CVA, seizures and recent admission for DKA 4/5-4/10 was readmitted 4/11 for DKA. CT revealed RUL cavitary lesion, b/l nodules and new GGO Rt lung. Last seen by pulm outpatient on 12/2021 and there was concern for met disease, MAC and aspergillus. Afb sputum cx grew m gordonae (12/26/21) and MAC (1/11/22). Aspergillus antigen and fungitell were negative on 1/25/22. Currently on day 3 augmentin for possible aspiration pna. Denies sob, cough, fevers or chills. Notes unintentional weight loss.     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    Currently does not meet criteria for pulm NTM (needs at minimum 1 positive afb cx from BAL OR 2 positive afb sputum cx).    Recommendations:  --if fungal serologies suggestive of aspergillus would recommend a trial of antifungal therapy especially since patient likely a poor candidate for surgical resection. These can be followed-up on outpatient basis.    --Agree with repeating afb sputum culture (please collect prior to discharge)     --if patient wishes to continue to f/u at [place] would refer to NTM clinic at [facility] with Dr. Herrera for ID follow-up. Otherwise, he can f/u with [place] [facility].         Thank you for your consult. I will sign off. Please contact us if you have any additional questions.    Germaine Lozano MD  Infectious Disease  [facility] - Tele[facility]  Stepdown    Subjective:     Principal Problem: Pulmonary cavitary lesion    HPI: 76 y.o. M w hx of HTN, T2DM, CAD, STEMI, HLD, pAfib, CVA, seizures and recent admission for DKA 4/5-4/10 was readmitted 4/11 for DKA. CT revealed RUL cavitary lesion, b/l nodules and new GGO Rt lung. Last seen by pulm 12/2021 and there was concern for met disease, MAC and aspergillus. Afb sputum cx grew m gordonae (12/26/21) and MAC (1/11/22). Aspergillus antigen and fungitell were negative on 1/25/22. On day 3 for possible aspiration pna. Denies sob, cough, fevers or chills. Notes unintentional weight loss.     Pt lives in [facility] [place] with wife who is currently hospitalized with covid. Is a retired . No hx of foreign travel. Quit smoking 30yrs ago. Has pet cats. Enjoys gardening/ spending time outdoors. Not on immunosuppressive agents.      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    Hyperlipidemia     Hypertension     Kidney stone     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 8/2/2012    STEMI involving right coronary artery 1/9/2022    Type II or unspecified type diabetes mellitus with neurological manifestations, uncontrolled(250.62) 3/8/2013    Urinary tract infection        Past Surgical History:   Procedure Laterality Date    BACK SURGERY      CATARACT EXTRACTION W/  INTRAOCULAR LENS IMPLANT Right     Per Dr Romero note 11/2018    COLONOSCOPY N/A 1/28/2019    Procedure: COLONOSCOPY Suprep;  Surgeon: Anh Johnson MD;  Location: [facility] ENDO;  Service: Endoscopy;  Laterality: N/A;    EYE SURGERY      HERNIA REPAIR      LEFT HEART CATHETERIZATION Left 1/9/2022    Procedure: CATHETERIZATION, HEART, LEFT;  Surgeon: Will Hurst III, MD;  Location: [facility] CATH LAB/EP;  Service: Cardiology;  Laterality: Left;    renal stones      SHOULDER OPEN  "ROTATOR CUFF REPAIR         Review of patient's allergies indicates:   Allergen Reactions    Iodine      Other reaction(s): swelling  Other reaction(s): Itching  Other reaction(s): Rash       Medications:  Medications Prior to Admission   Medication Sig    amiodarone (PACERONE) 200 MG Tab Take 1 tablet (200 mg total) by mouth once daily.    apixaban (ELIQUIS) 5 mg Tab Take 1 tablet (5 mg total) by mouth 2 (two) times daily.    aspirin (ECOTRIN) 81 MG EC tablet Take 81 mg by mouth once daily.    atorvastatin (LIPITOR) 40 MG tablet Take 1 tablet (40 mg total) by mouth once daily.    BD ULTRA-FINE SHORT PEN NEEDLE 31 gauge x 5/16" Ndle 3 (three) times daily.    blood sugar diagnostic Strp 1 strip by Misc.(Non-Drug; Combo Route) route 2 (two) times a day.    cetirizine (ZYRTEC) 10 MG tablet Take 1 tablet (10 mg total) by mouth every evening.    clopidogreL (PLAVIX) 75 mg tablet Take 1 tablet (75 mg total) by mouth once daily.    diclofenac sodium (VOLTAREN) 1 % Gel Apply 4 g topically 3 (three) times daily. Apply to sacrun closed skin/buttocks    ergocalciferol (ERGOCALCIFEROL) 50,000 unit Cap Take 1 capsule (50,000 Units total) by mouth every 7 days.    gabapentin (NEURONTIN) 100 MG capsule Take 2 capsules (200 mg total) by mouth 3 (three) times daily.    insulin aspart U-100 (NOVOLOG) 100 unit/mL (3 mL) InPn pen **LOW CORRECTION DOSE**  Blood Glucose  mg/dL                  Pre-meal                  151-200                0 unit                        201-250                2 units                     251-300                3 units                    301-350                4 units              >350                     5 units                      Administer subcutaneously if needed at times designated by monitoring schedule.    insulin aspart U-100 (NOVOLOG) 100 unit/mL (3 mL) InPn pen Inject 8 Units into the skin 3 (three) times daily with meals.    insulin glargine (LANTUS) 100 unit/mL injection " "Inject 8 Units into the skin once daily.    lacosamide (VIMPAT) 100 mg Tab Take 1 tablet (100 mg total) by mouth every 12 (twelve) hours.    lancets (ONETOUCH ULTRASOFT LANCETS) Misc 1 lancet by Misc.(Non-Drug; Combo Route) route 2 (two) times a day.    losartan (COZAAR) 25 MG tablet Take 1 tablet (25 mg total) by mouth once daily.    magnesium oxide (MAG-OX) 400 mg (241.3 mg magnesium) tablet Take 1 tablet (400 mg total) by mouth 2 (two) times daily.    metFORMIN (GLUCOPHAGE) 1000 MG tablet Take 1 tablet (1,000 mg total) by mouth 2 (two) times daily with meals.    metoprolol succinate (TOPROL-XL) 50 MG 24 hr tablet Take 1 tablet (50 mg total) by mouth once daily.    MULTIVITAMIN ORAL Take 1 tablet by mouth once daily.     neomycin-bacitracin-polymyxin (NEOSPORIN) ointment Apply topically 2 (two) times daily.    nitroGLYCERIN (NITROSTAT) 0.4 MG SL tablet Place 1 tablet (0.4 mg total) under the tongue every 5 (five) minutes as needed for Chest pain.    pantoprazole (PROTONIX) 40 MG tablet Take 1 tablet (40 mg total) by mouth once daily.    pen needle, diabetic (PEN NEEDLE) 30 gauge x 5/16" Ndle 1 Units by Misc.(Non-Drug; Combo Route) route 3 (three) times daily.    polycarbophil (FIBERCON) 625 mg tablet Take 1 tablet by mouth once daily.     senna-docusate 8.6-50 mg (PERICOLACE) 8.6-50 mg per tablet Take 1 tablet by mouth once daily.    sucralfate (CARAFATE) 1 gram tablet Take 1 tablet (1 g total) by mouth 3 (three) times daily before meals.     Antibiotics (From admission, onward)                Start     Stop Route Frequency Ordered    04/20/22 1215  amoxicillin-clavulanate 875-125mg per tablet 1 tablet         04/27 0859 Oral Every 12 hours 04/20/22 1207          Antifungals (From admission, onward)                None          Antivirals (From admission, onward)      None             Immunization History   Administered Date(s) Administered    COVID-19, MRNA, LN-S, PF (Pfizer) (Purple Cap) " 2021, 10/29/2021    Influenza (FLUAD) - Quadrivalent - Adjuvanted - PF *Preferred* (65+) 2022    Influenza (FLUAD) - Trivalent - Adjuvanted - PF (65+) 10/10/2019, 10/10/2019    Influenza - High Dose - PF (65 years and older) 10/21/2011, 2014, 10/26/2015, 10/27/2016, 2017, 2017, 2018    Influenza - Quadrivalent - High Dose - PF (65 years and older) 10/02/2020, 10/02/2020    Influenza - Quadrivalent - PF *Preferred* (6 months and older) 10/26/2012, 10/04/2013    Influenza Split 10/27/2006, 10/08/2009, 10/26/2012, 10/26/2012, 10/04/2013, 10/04/2013    Pneumococcal Conjugate - 13 Valent 10/27/2016    Pneumococcal Polysaccharide - 23 Valent 10/04/2013    Tdap 2018    Zoster Recombinant 2019, 2019, 2019, 2019       Family History       Problem Relation (Age of Onset)    Diabetes Father          Social History     Socioeconomic History    Marital status:    Tobacco Use    Smoking status: Former Smoker     Packs/day: 1.50     Years: 25.00     Pack years: 37.50     Quit date: 1983     Years since quittin.3    Smokeless tobacco: Never Used   Substance and Sexual Activity    Alcohol use: No    Drug use: No    Sexual activity: Yes     Partners: Female   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     Social Determinants of Health     Financial Resource Strain: Low Risk     Difficulty of Paying Living Expenses: Not hard at all   Food Insecurity: No Food Insecurity    Worried About Running Out of Food in the Last Year: Never true    Ran Out of Food in the Last Year: Never true   Transportation Needs: No Transportation Needs    Lack of Transportation (Medical): No    Lack of Transportation (Non-Medical): No   Housing Stability: Low Risk     Unable to Pay for Housing in the Last Year: No    Number of Places Lived in the Last Year: 1    Unstable Housing in the Last Year: No      Review of Systems   Constitutional:  Positive for appetite change. Negative for chills, diaphoresis, fatigue and fever.   HENT:  Negative for congestion, sore throat and trouble swallowing.    Eyes:  Negative for visual disturbance.   Respiratory:  Negative for cough and shortness of breath.    Gastrointestinal:  Positive for constipation. Negative for abdominal pain and diarrhea.   Genitourinary:  Negative for difficulty urinating and dysuria.   Musculoskeletal:  Negative for arthralgias and joint swelling.   Skin:  Negative for color change and rash.   Allergic/Immunologic: Negative for immunocompromised state.   Neurological:  Negative for dizziness and headaches.   Psychiatric/Behavioral:  Negative for agitation and confusion.    Objective:     Vital Signs (Most Recent):  Temp: 97.7 °F (36.5 °C) (04/21/22 1658)  Pulse: 61 (04/21/22 1658)  Resp: 18 (04/21/22 1658)  BP: 117/62 (04/21/22 1658)  SpO2: 95 % (04/21/22 1658) Vital Signs (24h Range):  Temp:  [96.5 °F (35.8 °C)-98.6 °F (37 °C)] 97.7 °F (36.5 °C)  Pulse:  [57-96] 61  Resp:  [15-18] 18  SpO2:  [92 %-100 %] 95 %  BP: (117-157)/(62-74) 117/62     Weight: 81.6 kg (179 lb 14.3 oz)  Body mass index is 25.09 kg/m².    Estimated Creatinine Clearance: 81.1 mL/min (based on SCr of 0.8 mg/dL).    Physical Exam  Vitals and nursing note reviewed.   Constitutional:       Comments: Appears frail   HENT:      Mouth/Throat:      Mouth: Mucous membranes are moist.   Eyes:      Conjunctiva/sclera: Conjunctivae normal.      Pupils: Pupils are equal, round, and reactive to light.   Cardiovascular:      Rate and Rhythm: Normal rate and regular rhythm.   Pulmonary:      Effort: Pulmonary effort is normal.      Breath sounds: No wheezing.      Comments: Creased breath sounds  Abdominal:      General: Abdomen is flat.      Palpations: Abdomen is soft.      Tenderness: There is no abdominal tenderness.   Musculoskeletal:         General: No swelling. Normal range of motion.       Cervical back: Normal range of motion.   Skin:     General: Skin is warm.      Coloration: Skin is not jaundiced.   Neurological:      General: No focal deficit present.      Mental Status: He is alert and oriented to person, place, and time.   Psychiatric:         Mood and Affect: Mood normal.         Behavior: Behavior normal.       Significant Labs: All pertinent labs within the past 24 hours have been reviewed.    Significant Imaging: I have reviewed all pertinent imaging results/findings within the past 24 hours.

## 2022-04-21 NOTE — PLAN OF CARE
[facility] - Telemetry Stepdown  Discharge Reassessment    Primary Care Provider: Basim Guerrero MD    Expected Discharge Date: 4/22/2022    Reassessment (most recent)     Discharge Reassessment - 04/21/22 1539        Discharge Reassessment    Assessment Type Discharge Planning Reassessment     Did the patient's condition or plan change since previous assessment? No     Discharge Plan discussed with: Patient;Adult children     Communicated ALLIE with patient/caregiver Yes     Discharge Plan A Skilled Nursing Facility     Discharge Plan B New Nursing Home placement [facility]     DME Needed Upon Discharge  other (see comments)   TBD    Discharge Barriers Identified None     Why the patient remains in the hospital Requires continued medical care        Post-Acute Status    Post-Acute Authorization Placement     Post-Acute Placement Status Referrals Sent     Discharge Delays None known at this time               JASIEL spoke with pt's daughter (Basia) [phone] and son (Kamar) [phone] to discuss discharge plan. SW discussed SNF to prison.  In addition, pt's son inquired about remaining number of SNF days and processing of application for long term medicaid.  Pt's son discussed expenses associated with prison placement and advised SW they are unable to afford expenses for both father and mother.  Pt's wife also in hospital on 9th floor.      Pt's son does not feel as though pt can go home and will eventually need long term placement.      JASIEL completed LOCET via phone and faxed PASRR to [facility] (fx. [phone]) to obtain the 142 for NH admission.    JASIEL emailed [facility] in-network SNF facilities to pt's son, Kamar ([email or web address])    JASIEL spoke to Tao boyd/ [place] [phone] to verify pt's SNF benefits.  Tao advised pt has 100 SNF days and has used 22 of those days.  Pt has a remaining 78 days and will have a co-payment of $178.00 per day.  JASIEL notified  pt's son of the above information.        Oliva Lorenzo, SHANNON  PRN-  [facility]  Ext. [phone]

## 2022-04-21 NOTE — SUBJECTIVE & OBJECTIVE
"Interval HPI:   Blood sugars in reasonable control    BP (!) 157/74   Pulse (!) 57   Temp 96.5 °F (35.8 °C)   Resp 16   Ht 5' 11" (1.803 m)   Wt 81.6 kg (179 lb 14.3 oz)   SpO2 (!) 92%   BMI 25.09 kg/m²     Labs Reviewed and Include    Recent Labs   Lab 04/21/22  0535   *   CALCIUM 8.5*   ALBUMIN 2.4*   PROT 5.9*   *   K 3.8   CO2 25      BUN 16   CREATININE 0.8   ALKPHOS 116   ALT 22   AST 37   BILITOT 0.4     Lab Results   Component Value Date    WBC 4.82 04/21/2022    HGB 11.8 (L) 04/21/2022    HCT 36.0 (L) 04/21/2022    MCV 89 04/21/2022     04/21/2022     No results for input(s): TSH, FREET4 in the last 168 hours.  Lab Results   Component Value Date    HGBA1C 9.7 (H) 04/05/2022       Nutritional status:   Body mass index is 25.09 kg/m².  Lab Results   Component Value Date    ALBUMIN 2.4 (L) 04/21/2022    ALBUMIN 2.4 (L) 04/20/2022    ALBUMIN 2.5 (L) 04/19/2022     No results found for: PREALBUMIN    Estimated Creatinine Clearance: 81.1 mL/min (based on SCr of 0.8 mg/dL).    Accu-Checks  Recent Labs     04/19/22  1538 04/19/22  1753 04/19/22  2201 04/20/22  0729 04/20/22  1201 04/20/22  1307 04/20/22  1610 04/20/22  1826 04/20/22  2125 04/21/22  0826   POCTGLUCOSE 149* 216* 141* 108 300* 322* 291* 237* 192* 155*       Current Medications and/or Treatments Impacting Glycemic Control  Immunotherapy:    Immunosuppressants       None          Steroids:   Hormones (From admission, onward)                Start     Stop Route Frequency Ordered    04/14/22 1047  melatonin tablet 6 mg  (Medication Panel)         -- Oral Nightly PRN 04/14/22 0948          Pressors:    Autonomic Drugs (From admission, onward)                None          Hyperglycemia/Diabetes Medications:   Antihyperglycemics (From admission, onward)                Start     Stop Route Frequency Ordered    04/21/22 0715  insulin aspart U-100 pen 6-10 Units         -- SubQ 3 times daily with meals 04/20/22 1652    04/20/22 " 2100  insulin detemir U-100 pen 7 Units         -- SubQ 2 times daily 04/20/22 1652    04/17/22 0111  insulin aspart U-100 pen 4 Units         -- SubQ With snacks 04/17/22 0012    04/13/22 0819  insulin aspart U-100 pen 1-10 Units         -- SubQ Every 6 hours PRN 04/13/22 0720

## 2022-04-21 NOTE — PLAN OF CARE
Remains on room air with no episodes of hemoptysis.  Fungitell and Aspergillus antigen pending.  If possible, obtain AFBs while here which may guide future therapy as an outpatient.  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  Both of these issues in the absence of respiratory symptoms or worsening clinical trajectory can be done as outpatient.  Would prefer follow-up CT chest in 6 months, rather than 1 year.  Follow-up with pulmonary as outpatient.  Will follow peripherally pending ID recs.  Please feel free to contact us with any questions or concerns regarding the care of this patient.

## 2022-04-21 NOTE — ASSESSMENT & PLAN NOTE
Key History and Diagnostic Findings  - A1c of 9.7 on 2022 from 11.5 on 2022  - Home Regimen: Levemir 8 units daily, aspart 8 units TIDWM  - Weight based dosin kg x 0.5 = 41 TDD x 0.5 = 20 basal / 20 prandial  - 1700/TDD = 41 (estimated insulin sensitivity factor)  - 450/TDD = 11 (estimated starting carb ratio for prandial dosing)  - Glucose Goals: 160-200mg/dL  - 24 hour glucose trend: blood sugars tight in the morning    Plan  - continue levemir to 7 units twice daily  - changed yesterday to aspart to 6-10 units TIDWM with moderate correction scale depending on his meal intake  - Hypoglycemia protocol in place  - If blood glucose greater than 300, please ask patient not to eat food or drink anything other than water until correctional insulin has brought it back below 250  - Please ensure patient receives their p.r.n. insulin aspart if they eat a snack with more than 30 g of carbohydrate  - Daughter wishes to pursue skilled nursing facility for discharge as she states he cannot be adequately taken care of at home

## 2022-04-21 NOTE — PLAN OF CARE
Problem: Adult Inpatient Plan of Care  Goal: Plan of Care Review  Outcome: Met  Goal: Patient-Specific Goal (Individualized)  Outcome: Met  Goal: Absence of Hospital-Acquired Illness or Injury  Outcome: Met  Goal: Optimal Comfort and Wellbeing  Outcome: Met  Goal: Readiness for Transition of Care  Outcome: Met     Problem: Diabetes Comorbidity  Goal: Blood Glucose Level Within Targeted Range  Outcome: Met     Problem: Fluid and Electrolyte Imbalance (Acute Kidney Injury/Impairment)  Goal: Fluid and Electrolyte Balance  Outcome: Met     Problem: Oral Intake Inadequate (Acute Kidney Injury/Impairment)  Goal: Optimal Nutrition Intake  Outcome: Met     Problem: Renal Function Impairment (Acute Kidney Injury/Impairment)  Goal: Effective Renal Function  Outcome: Met     Problem: Impaired Wound Healing  Goal: Optimal Wound Healing  Outcome: Met     Problem: Skin Injury Risk Increased  Goal: Skin Health and Integrity  Outcome: Met     Problem: Diabetic Ketoacidosis  Goal: Fluid and Electrolyte Balance with Absence of Ketosis  Outcome: Met     Problem: Fall Injury Risk  Goal: Absence of Fall and Fall-Related Injury  Outcome: Met   Patient in bed resting with T.V. on have consumed 50% of dinner. Patient have no complain of pain or discomfort.

## 2022-04-21 NOTE — PROGRESS NOTES
[facility] - Telemetry [facility] Medicine  Progress Note    Patient Name: Kamar Muñoz  MRN: [id number]  Patient Class: IP- Inpatient   Admission Date: 4/11/2022  Length of Stay: 10 days  Attending Physician: Silas Singer MD  Primary Care Provider: Basim Guerrero MD        Subjective:     Principal Problem:Pulmonary cavitary lesion        HPI:  Mr. Kamar Muñoz is a 76 y.o. male with a past medical history of HTN, Type 2 DM, CAD, STEMI, HLD, paroxysmal Afib, CVA, seizures and recurrent DKA.  He presented to [facility] ED on 4/11 with elevated blood glucose.  He was discharged from [facility] on 4/10 after being admitted for DKA on 4/5.  At time of exam patient is alert but altered and unable to provide any history.  Spoke with patient's daughter who states she is a primary caregiver at home and obtained history as well as review of chart.  Per family he was not feeling well after discharge to home and vomited several times on 4/11. Unknown characteristics of emesis. Finger stick at home read High with unreadable blood glucose.  At this time daughter gave him 14 units of insulin and sublingual zofran. Other than malaise and nausea patient was not exhibiting any other signs/symptoms at home.  In ER BG found to be 1015 with pCO2 6 and anion gap 35.  Hyperkalemic with K 7.0 and some EKG changes when compared to previous EKG.  Given calcium gluconate, and started on insulin gtt as well as subQ long acting insulin.      Critical Care Medicine consulted for DKA and admitted to MICU.        Overview/Hospital Course:  Admitted to MICU on 4/11 for DKA with BG >1000, pCO2 6, AG 35, and hyperkalemia.  Given Calcium gluconate and started on insulin gtt in ED.  Hyperkalemia not improved on repeat labs and bolused with IV insulin.  Infectious workup sent and broad spectrum abx started.  4/12 potassium improving with insulin. 4/13 Gap closed, insulin gtt turned off and switched to subq insulin. Endocrine consulted for  insulin mgmt. He was stepped down to  4/14.    Since step down stable. Advanced diet. Endocrine following and titrating insulin. Poor po intake making it difficult to adjust insulin. CT chest with cavitary lesion, pulmonary and ID consulted. SNF once medically stable for dc.      Interval History: Pt seen and examined this morning on rounds. MARY. Updated on care plan with need for pulmonary and ID consults. His primary complaint is blood draws and requests these be discontinued; discussed that daily labs are still necessary. Appetite improved. Care plan reviewed. Otherwise, doing well and with no further complaints at this time.      Objective:     Vital Signs (Most Recent):  Temp: 96.5 °F (35.8 °C) (04/21/22 0822)  Pulse: (!) 57 (04/21/22 0822)  Resp: 16 (04/21/22 0423)  BP: (!) 157/74 (04/21/22 0822)  SpO2: (!) 92 % (04/21/22 0822)   Vital Signs (24h Range):  Temp:  [96.5 °F (35.8 °C)-98.6 °F (37 °C)] 96.5 °F (35.8 °C)  Pulse:  [57-96] 57  Resp:  [15-18] 16  SpO2:  [92 %-100 %] 92 %  BP: (120-157)/(61-74) 157/74     Weight: 81.6 kg (179 lb 14.3 oz)  Body mass index is 25.09 kg/m².    Intake/Output Summary (Last 24 hours) at 4/21/2022 1235  Last data filed at 4/20/2022 1400  Gross per 24 hour   Intake 240 ml   Output --   Net 240 ml        Physical Exam  Gen: in NAD, appears stated age  Neuro: AAOx4, CN2-12 grossly intact BL; motor, sensory, and strength grossly intact BL  HEENT: NTNC, EOMI, PERRLA, MMM; no thyromegaly or lymphadenopathy; no JVD appreciated  CVS: RRR, no m/r/g; S1/S2 auscultated with no S3 or S4; capillary refill < 2 sec  Resp: lungs CTAB, no w/r/r; no belabored breathing or accessory muscle use appreciated   Abd: BS+ in all 4 quadrants; NTND, soft to palpation; no organomegaly appreciated   Extrem: pulses full, equal, and regular over all 4 extremities; no UE or LE edema BL      Significant Labs: All pertinent labs within the past 24 hours have been reviewed.    Significant Imaging: I have  "reviewed all pertinent imaging results/findings within the past 24 hours.      Assessment/Plan:      * Pulmonary cavitary lesion  - As per CT, noted about a month ago  - Repeat CT Chest without contrast obtained on 4/19 revealed "an evolving appearance of a right upper lobe posterior segment cavitary lesion with internal dependent mass; the cavity demonstrates a thinner wall and has decreased in diameter.  There is adjacent contracture of parenchyma.  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  The central debris is decreased in size,, now measuring 1.0 cm (axial series 4, image 81), previously 2.0 cm, and various other nodules".   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 Louie outpatient.   - On RA      Goals of care, counseling/discussion  - DNR as per ICU    Severe malnutrition  Nutrition consulted. Most recent weight and BMI monitored-          Malnutrition (Moderate to Severe)  Weight Loss (Malnutrition): greater than 7.5% in 3 months  Energy Intake (Malnutrition): less than 75% for greater than or equal to 1 month              Measurements:  Wt Readings from Last 1 Encounters:   04/19/22 81.6 kg (179 lb 14.3 oz)   Body mass index is 25.09 kg/m².    Recommendations: Recommendation/Intervention: 1. Continue current Diabetic diet + ONS. 2. RD to monitor " & follow-up.  Goals: Meet % EEN, EPN by RD f/u date    Patient has been screened and assessed by RD. RD will follow patient.    Ketosis-prone diabetes mellitus  - Interval history and physical exam findings as described above  - DKA now resolved  - Working to optimize BG control  - Endocrine following and adjusting insulin regimen as needed  - SSI provided for corrective dosing  - DXTs as ordered  - Hypoglycemic protocol in effect    Focal seizures  - History of seizures on lacosamide.    - Continue home lacosamide    Coronary artery disease  - Stable  - Continue home regimen of aspirin, atorvastatin, clopidogrel, losartan, and metoprolol      Paroxysmal atrial fibrillation  - History of afib.    - Continue home regimen of amiodarone, apixaban, and metoprolol        Essential hypertension  - resume home meds as able      VTE Risk Mitigation (From admission, onward)         Ordered     apixaban tablet 5 mg  2 times daily         04/20/22 1209     IP VTE HIGH RISK PATIENT  Once         04/11/22 2342     Place sequential compression device  Until discontinued         04/11/22 2138                Discharge Planning   ALLIE: 4/22/2022     Code Status: DNR   Is the patient medically ready for discharge?: No    Reason for patient still in hospital (select all that apply): Patient trending condition  Discharge Plan A: Skilled Nursing Facility   Discharge Delays: None known at this time        Silas Singer MD  Attending Physician  Department of Hospital Medicine  Epic secure chat preferred, or [employer] ext. [phone]  4/21/2022

## 2022-04-21 NOTE — SUBJECTIVE & OBJECTIVE
Interval History: Pt seen and examined this morning on rounds. MARY. Updated on care plan with need for pulmonary and ID consults. His primary complaint is blood draws and requests these be discontinued; discussed that daily labs are still necessary. Appetite improved. Care plan reviewed. Otherwise, doing well and with no further complaints at this time.      Objective:     Vital Signs (Most Recent):  Temp: 96.5 °F (35.8 °C) (04/21/22 0822)  Pulse: (!) 57 (04/21/22 0822)  Resp: 16 (04/21/22 0423)  BP: (!) 157/74 (04/21/22 0822)  SpO2: (!) 92 % (04/21/22 0822)   Vital Signs (24h Range):  Temp:  [96.5 °F (35.8 °C)-98.6 °F (37 °C)] 96.5 °F (35.8 °C)  Pulse:  [57-96] 57  Resp:  [15-18] 16  SpO2:  [92 %-100 %] 92 %  BP: (120-157)/(61-74) 157/74     Weight: 81.6 kg (179 lb 14.3 oz)  Body mass index is 25.09 kg/m².    Intake/Output Summary (Last 24 hours) at 4/21/2022 1235  Last data filed at 4/20/2022 1400  Gross per 24 hour   Intake 240 ml   Output --   Net 240 ml        Physical Exam  Gen: in NAD, appears stated age  Neuro: AAOx4, CN2-12 grossly intact BL; motor, sensory, and strength grossly intact BL  HEENT: NTNC, EOMI, PERRLA, MMM; no thyromegaly or lymphadenopathy; no JVD appreciated  CVS: RRR, no m/r/g; S1/S2 auscultated with no S3 or S4; capillary refill < 2 sec  Resp: lungs CTAB, no w/r/r; no belabored breathing or accessory muscle use appreciated   Abd: BS+ in all 4 quadrants; NTND, soft to palpation; no organomegaly appreciated   Extrem: pulses full, equal, and regular over all 4 extremities; no UE or LE edema BL      Significant Labs: All pertinent labs within the past 24 hours have been reviewed.    Significant Imaging: I have reviewed all pertinent imaging results/findings within the past 24 hours.

## 2022-04-21 NOTE — PT/OT/SLP PROGRESS
"Occupational Therapy  Co Treatment    Name: Kamar Muñoz  MRN: [id number]  Admitting Diagnosis:  Pulmonary cavitary lesion       Recommendations:     Discharge Recommendations: nursing facility, skilled  Discharge Equipment Recommendations:   (TBD)  Barriers to discharge:  Decreased caregiver support  Co-evaluation/treatment performed due to patient's multiple deficits requiring two skilled therapists to appropriately and safely assess patient's strength and endurance while facilitating functional tasks in addition to accommodating for patient's activity tolerance.     Assessment:     Kamar Muñoz is a 76 y.o. male with a medical diagnosis of Pulmonary cavitary lesion.  He presents with participation wit encouragement. Performance deficits affecting function are gait instability, weakness, impaired endurance, impaired cardiopulmonary response to activity, impaired self care skills, impaired functional mobilty, impaired cognition, impaired balance. Patient shared his concerns over his wife's illness and concerns about discharge setting. He declined donned socks, donning gown, grooming and using toilet in bathroom. He completed bed mobility with supervision, supine to sit with SBA, sit to stand with RW with cues for proper use of RW with CGA, in room and ding functional mobility with cues for safety and RW use. Sit to supine with supervision.     Rehab Prognosis:  Good; patient would benefit from acute skilled OT services to address these deficits and reach maximum level of function.       Plan:     Patient to be seen 4 x/week to address the above listed problems via self-care/home management, therapeutic activities, therapeutic exercises  · Plan of Care Expires: 05/15/22  · Plan of Care Reviewed with: patient    Subjective   "My wife is in the room down the ding, she has Covid and fell and hit her head."  Pain/Comfort:  · Pain Rating 1: 0/10  · Pain Rating Post-Intervention 2: 0/10    Objective: "     Communicated with: RN prior to session.  Patient found HOB elevated with peripheral IV, telemetry (VISI) upon OT entry to room.    General Precautions: Standard, diabetic, fall   Orthopedic Precautions:N/A   Braces: N/A  Respiratory Status: Room air     Occupational Performance:     Bed Mobility:    · Patient completed Rolling/Turning to Left with  supervision  · Patient completed Rolling/Turning to Right with supervision  · Patient completed Scooting/Bridging with supervision  · Patient completed Supine to Sit with supervision  · Patient completed Sit to Supine with supervision     Functional Mobility/Transfers:  · Patient completed Sit <> Stand Transfer with contact guard assistance  with  rolling walker   · Functional Mobility: room and ding mobility with RW cues for positioning of RW for safety.       [facility] [address] [facility] 6 Click ADL: 17    Treatment & Education:  Education on benefits of OOB, mobility and participation with therapy.     Patient left HOB elevated with all lines intact, call button in reach and RN notifiedEducation:      GOALS:   Multidisciplinary Problems     Occupational Therapy Goals        Problem: Occupational Therapy    Goal Priority Disciplines Outcome Interventions   Occupational Therapy Goal     OT, PT/OT Ongoing, Progressing    Description: Goals to be met by: 4/29/2022     Patient will increase functional independence with ADLs by performing:    UE Dressing with Set-up Assistance.  LE Dressing with Minimal Assistance using AD PRN.  Grooming while standing at sink with Stand-by Assistance.  Toileting from toilet with Supervision for hygiene and clothing management.   Step transfer with Supervision using AD PRN.  Toilet transfer to toilet with Stand-by Assistance using AD PRN.                     Time Tracking:     OT Date of Treatment: 04/21/22  OT Start Time: 1003  OT Stop Time: 1029  OT Total Time (min): 26 min    Billable Minutes:Self Care/Home Management 13  Therapeutic Activity 13    OT/JUAN:  OT          4/21/2022

## 2022-04-21 NOTE — SUBJECTIVE & OBJECTIVE
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 8/2/2012    STEMI involving right coronary artery 1/9/2022    Type II or unspecified type diabetes mellitus with neurological manifestations, uncontrolled(250.62) 3/8/2013    Urinary tract infection        Past Surgical History:   Procedure Laterality Date    BACK SURGERY      CATARACT EXTRACTION W/  INTRAOCULAR LENS IMPLANT Right     Per Dr Romero note 11/2018    COLONOSCOPY N/A 1/28/2019    Procedure: COLONOSCOPY Suprep;  Surgeon: Anh Johnson MD;  Location: [facility] [place] ENDO;  Service: Endoscopy;  Laterality: N/A;    EYE SURGERY      HERNIA REPAIR      LEFT HEART CATHETERIZATION Left 1/9/2022    Procedure: CATHETERIZATION, HEART, LEFT;  Surgeon: Will Hurst III, MD;  Location: [facility] [place] CATH LAB/EP;  Service: Cardiology;  Laterality: Left;    renal stones      SHOULDER OPEN ROTATOR CUFF REPAIR         Review of patient's allergies indicates:   Allergen Reactions    Iodine      Other reaction(s): swelling  Other reaction(s): Itching  Other reaction(s): Rash       Medications:  Medications Prior to Admission   Medication Sig    amiodarone (PACERONE) 200 MG Tab Take 1 tablet (200 mg total) by mouth once daily.    apixaban (ELIQUIS) 5 mg Tab Take 1 tablet (5 mg total) by mouth 2 (two) times daily.    aspirin (ECOTRIN) 81 MG EC tablet Take 81 mg by mouth once daily.    atorvastatin (LIPITOR) 40 MG tablet Take 1 tablet (40 mg total) by mouth once daily.    BD ULTRA-FINE SHORT PEN NEEDLE 31 gauge x 5/16" Ndle 3 (three) times daily.    blood sugar diagnostic Strp 1 strip by Misc.(Non-Drug; Combo Route) route 2 (two) times a day.    cetirizine (ZYRTEC) 10 MG tablet Take 1 tablet (10 mg total) by mouth every evening.    " clopidogreL (PLAVIX) 75 mg tablet Take 1 tablet (75 mg total) by mouth once daily.    diclofenac sodium (VOLTAREN) 1 % Gel Apply 4 g topically 3 (three) times daily. Apply to sacrun closed skin/buttocks    ergocalciferol (ERGOCALCIFEROL) 50,000 unit Cap Take 1 capsule (50,000 Units total) by mouth every 7 days.    gabapentin (NEURONTIN) 100 MG capsule Take 2 capsules (200 mg total) by mouth 3 (three) times daily.    insulin aspart U-100 (NOVOLOG) 100 unit/mL (3 mL) InPn pen **LOW CORRECTION DOSE**  Blood Glucose  mg/dL                  Pre-meal                  151-200                0 unit                        201-250                2 units                     251-300                3 units                    301-350                4 units              >350                     5 units                      Administer subcutaneously if needed at times designated by monitoring schedule.    insulin aspart U-100 (NOVOLOG) 100 unit/mL (3 mL) InPn pen Inject 8 Units into the skin 3 (three) times daily with meals.    insulin glargine (LANTUS) 100 unit/mL injection Inject 8 Units into the skin once daily.    lacosamide (VIMPAT) 100 mg Tab Take 1 tablet (100 mg total) by mouth every 12 (twelve) hours.    lancets (ONETOUCH ULTRASOFT LANCETS) Misc 1 lancet by Misc.(Non-Drug; Combo Route) route 2 (two) times a day.    losartan (COZAAR) 25 MG tablet Take 1 tablet (25 mg total) by mouth once daily.    magnesium oxide (MAG-OX) 400 mg (241.3 mg magnesium) tablet Take 1 tablet (400 mg total) by mouth 2 (two) times daily.    metFORMIN (GLUCOPHAGE) 1000 MG tablet Take 1 tablet (1,000 mg total) by mouth 2 (two) times daily with meals.    metoprolol succinate (TOPROL-XL) 50 MG 24 hr tablet Take 1 tablet (50 mg total) by mouth once daily.    MULTIVITAMIN ORAL Take 1 tablet by mouth once daily.     neomycin-bacitracin-polymyxin (NEOSPORIN) ointment Apply topically 2 (two) times daily.    nitroGLYCERIN (NITROSTAT) 0.4 MG SL tablet Place  "1 tablet (0.4 mg total) under the tongue every 5 (five) minutes as needed for Chest pain.    pantoprazole (PROTONIX) 40 MG tablet Take 1 tablet (40 mg total) by mouth once daily.    pen needle, diabetic (PEN NEEDLE) 30 gauge x 5/16" Ndle 1 Units by Misc.(Non-Drug; Combo Route) route 3 (three) times daily.    polycarbophil (FIBERCON) 625 mg tablet Take 1 tablet by mouth once daily.     senna-docusate 8.6-50 mg (PERICOLACE) 8.6-50 mg per tablet Take 1 tablet by mouth once daily.    sucralfate (CARAFATE) 1 gram tablet Take 1 tablet (1 g total) by mouth 3 (three) times daily before meals.     Antibiotics (From admission, onward)                Start     Stop Route Frequency Ordered    04/20/22 1215  amoxicillin-clavulanate 875-125mg per tablet 1 tablet         04/27 0859 Oral Every 12 hours 04/20/22 1207          Antifungals (From admission, onward)                None          Antivirals (From admission, onward)      None             Immunization History   Administered Date(s) Administered    COVID-19, MRNA, LN-S, PF (Pfizer) (Purple Cap) 01/29/2021, 10/29/2021    Influenza (FLUAD) - Quadrivalent - Adjuvanted - PF *Preferred* (65+) 01/19/2022    Influenza (FLUAD) - Trivalent - Adjuvanted - PF (65+) 10/10/2019, 10/10/2019    Influenza - High Dose - PF (65 years and older) 10/21/2011, 11/14/2014, 10/26/2015, 10/27/2016, 11/27/2017, 11/27/2017, 11/26/2018    Influenza - Quadrivalent - High Dose - PF (65 years and older) 10/02/2020, 10/02/2020    Influenza - Quadrivalent - PF *Preferred* (6 months and older) 10/26/2012, 10/04/2013    Influenza Split 10/27/2006, 10/08/2009, 10/26/2012, 10/26/2012, 10/04/2013, 10/04/2013    Pneumococcal Conjugate - 13 Valent 10/27/2016    Pneumococcal Polysaccharide - 23 Valent 10/04/2013    Tdap 11/28/2018    Zoster Recombinant 05/31/2019, 05/31/2019, 08/05/2019, 08/05/2019       Family History       Problem Relation (Age of Onset)    Diabetes Father          Social History "     Socioeconomic History    Marital status:    Tobacco Use    Smoking status: Former Smoker     Packs/day: 1.50     Years: 25.00     Pack years: 37.50     Quit date: 1983     Years since quittin.3    Smokeless tobacco: Never Used   Substance and Sexual Activity    Alcohol use: No    Drug use: No    Sexual activity: Yes     Partners: Female   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     Social Determinants of Health     Financial Resource Strain: Low Risk     Difficulty of Paying Living Expenses: Not hard at all   Food Insecurity: No Food Insecurity    Worried About Running Out of Food in the Last Year: Never true    Ran Out of Food in the Last Year: Never true   Transportation Needs: No Transportation Needs    Lack of Transportation (Medical): No    Lack of Transportation (Non-Medical): No   Housing Stability: Low Risk     Unable to Pay for Housing in the Last Year: No    Number of Places Lived in the Last Year: 1    Unstable Housing in the Last Year: No     Review of Systems   Constitutional:  Positive for appetite change. Negative for chills, diaphoresis, fatigue and fever.   HENT:  Negative for congestion, sore throat and trouble swallowing.    Eyes:  Negative for visual disturbance.   Respiratory:  Negative for cough and shortness of breath.    Gastrointestinal:  Positive for constipation. Negative for abdominal pain and diarrhea.   Genitourinary:  Negative for difficulty urinating and dysuria.   Musculoskeletal:  Negative for arthralgias and joint swelling.   Skin:  Negative for color change and rash.   Allergic/Immunologic: Negative for immunocompromised state.   Neurological:  Negative for dizziness and headaches.   Psychiatric/Behavioral:  Negative for agitation and confusion.    Objective:     Vital Signs (Most Recent):  Temp: 97.7 °F (36.5 °C) (22)  Pulse: 61 (22)  Resp: 18 (22)  BP: 117/62 (22  1658)  SpO2: 95 % (04/21/22 1658) Vital Signs (24h Range):  Temp:  [96.5 °F (35.8 °C)-98.6 °F (37 °C)] 97.7 °F (36.5 °C)  Pulse:  [57-96] 61  Resp:  [15-18] 18  SpO2:  [92 %-100 %] 95 %  BP: (117-157)/(62-74) 117/62     Weight: 81.6 kg (179 lb 14.3 oz)  Body mass index is 25.09 kg/m².    Estimated Creatinine Clearance: 81.1 mL/min (based on SCr of 0.8 mg/dL).    Physical Exam  Vitals and nursing note reviewed.   Constitutional:       Comments: Appears frail   HENT:      Mouth/Throat:      Mouth: Mucous membranes are moist.   Eyes:      Conjunctiva/sclera: Conjunctivae normal.      Pupils: Pupils are equal, round, and reactive to light.   Cardiovascular:      Rate and Rhythm: Normal rate and regular rhythm.   Pulmonary:      Effort: Pulmonary effort is normal.      Breath sounds: No wheezing.      Comments: Creased breath sounds  Abdominal:      General: Abdomen is flat.      Palpations: Abdomen is soft.      Tenderness: There is no abdominal tenderness.   Musculoskeletal:         General: No swelling. Normal range of motion.      Cervical back: Normal range of motion.   Skin:     General: Skin is warm.      Coloration: Skin is not jaundiced.   Neurological:      General: No focal deficit present.      Mental Status: He is alert and oriented to person, place, and time.   Psychiatric:         Mood and Affect: Mood normal.         Behavior: Behavior normal.       Significant Labs: All pertinent labs within the past 24 hours have been reviewed.    Significant Imaging: I have reviewed all pertinent imaging results/findings within the past 24 hours.

## 2022-04-21 NOTE — ASSESSMENT & PLAN NOTE
- Interval history and physical exam findings as described above  - DKA now resolved  - Working to optimize BG control  - Endocrine following and adjusting insulin regimen as needed  - SSI provided for corrective dosing  - DXTs as ordered  - Hypoglycemic protocol in effect

## 2022-04-21 NOTE — PROGRESS NOTES
[facility] - Telemetry Stepdown  Endocrinology  Progress Note    Admit Date: 4/11/2022     76 year old  male with T2DM, HTN, CAD, STEMI, HLD, paroxysmal Afib, CVA, seizures who presents for recurrent DKA.  He presented to [facility] ED on 4/11 with elevated blood glucose.  He was discharged from [facility] on 4/10 after being admitted for DKA on 4/5. Per family he was not feeling well after discharge to home and vomited several times on 4/11. Finger stick at home read High with unreadable blood glucose.  At this time daughter gave him 14 units of insulin and sublingual zofran. Other than malaise and nausea patient was not exhibiting any other signs/symptoms at home.    - In ER BG found to be 1015 with pCO2 6 and anion gap 35.  Hyperkalemic with K 7.0 and some EKG changes when compared to previous EKG.  Given calcium gluconate, and started on insulin gtt as well as subQ long acting insulin     - Endocrinology consulted for management of type 2 diabetes after resolution of DKA    - Spoke with daughter who states patient was discharged on 04/10/2022 with high glucose in the 400s which worsened after getting home and eating dinner; appropriate mealtime and correction insulin were given at that time. However, the next day patient's condition was worsening and they called EMS and his sugar was found to be in the thousands. She expresses concern that he cannot be care for adequately at home any longer and wishes to pursue skilled nursing facility.    Regarding Diabetes Mellitus:    Recurring episodes of DKA  Surgical Procedure and Date: NA  Diabetes diagnosis year: >20 years  Home Diabetes Medications:  During recent SNF was on Aspart 8 TID and glargine 8 units daily with sliding scale  How often checking glucose at home? >4 x day   Diabetes Complications include: Hyperglycemia, Hypoglycemia , and Diabetic peripheral neuropathy   Complicating diabetes co morbidities: History of CVA      Interval HPI:   Blood sugars in reasonable  "control    BP (!) 157/74   Pulse (!) 57   Temp 96.5 °F (35.8 °C)   Resp 16   Ht 5' 11" (1.803 m)   Wt 81.6 kg (179 lb 14.3 oz)   SpO2 (!) 92%   BMI 25.09 kg/m²     Labs Reviewed and Include    Recent Labs   Lab 04/21/22  0535   *   CALCIUM 8.5*   ALBUMIN 2.4*   PROT 5.9*   *   K 3.8   CO2 25      BUN 16   CREATININE 0.8   ALKPHOS 116   ALT 22   AST 37   BILITOT 0.4     Lab Results   Component Value Date    WBC 4.82 04/21/2022    HGB 11.8 (L) 04/21/2022    HCT 36.0 (L) 04/21/2022    MCV 89 04/21/2022     04/21/2022     No results for input(s): TSH, FREET4 in the last 168 hours.  Lab Results   Component Value Date    HGBA1C 9.7 (H) 04/05/2022       Nutritional status:   Body mass index is 25.09 kg/m².  Lab Results   Component Value Date    ALBUMIN 2.4 (L) 04/21/2022    ALBUMIN 2.4 (L) 04/20/2022    ALBUMIN 2.5 (L) 04/19/2022     No results found for: PREALBUMIN    Estimated Creatinine Clearance: 81.1 mL/min (based on SCr of 0.8 mg/dL).    Accu-Checks  Recent Labs     04/19/22  1538 04/19/22  1753 04/19/22  2201 04/20/22  0729 04/20/22  1201 04/20/22  1307 04/20/22  1610 04/20/22  1826 04/20/22  2125 04/21/22  0826   POCTGLUCOSE 149* 216* 141* 108 300* 322* 291* 237* 192* 155*       Current Medications and/or Treatments Impacting Glycemic Control  Immunotherapy:    Immunosuppressants       None          Steroids:   Hormones (From admission, onward)                Start     Stop Route Frequency Ordered    04/14/22 1047  melatonin tablet 6 mg  (Medication Panel)         -- Oral Nightly PRN 04/14/22 0948          Pressors:    Autonomic Drugs (From admission, onward)                None          Hyperglycemia/Diabetes Medications:   Antihyperglycemics (From admission, onward)                Start     Stop Route Frequency Ordered    04/21/22 0715  insulin aspart U-100 pen 6-10 Units         -- SubQ 3 times daily with meals 04/20/22 1652    04/20/22 2100  insulin detemir U-100 pen 7 Units     "     -- SubQ 2 times daily 22 1652    22 0111  insulin aspart U-100 pen 4 Units         -- SubQ With snacks 22 0012    22 0819  insulin aspart U-100 pen 1-10 Units         -- SubQ Every 6 hours PRN 22 0720            ASSESSMENT and PLAN    Ketosis-prone diabetes mellitus  Key History and Diagnostic Findings  - A1c of 9.7 on 2022 from 11.5 on 2022  - Home Regimen: Levemir 8 units daily, aspart 8 units TIDWM  - Weight based dosin kg x 0.5 = 41 TDD x 0.5 = 20 basal / 20 prandial  - 1700/TDD = 41 (estimated insulin sensitivity factor)  - 450/TDD = 11 (estimated starting carb ratio for prandial dosing)  - Glucose Goals: 160-200mg/dL  - 24 hour glucose trend: blood sugars tight in the morning    Plan  - continue levemir to 7 units twice daily  - increase aspart to 8-12 units TIDWM with moderate correction scale depending on his meal intake  - Hypoglycemia protocol in place  - If blood glucose greater than 300, please ask patient not to eat food or drink anything other than water until correctional insulin has brought it back below 250  - Please ensure patient receives their p.r.n. insulin aspart if they eat a snack with more than 30 g of carbohydrate  - Daughter wishes to pursue skilled nursing facility for discharge as she states he cannot be adequately taken care of at home    Coronary artery disease  - Strive to optimize glucose control    BRENDAN (acute kidney injury)  - Resolved, decreased renal function can cause decreased renal clearance and result in insulin stacking increasing risk of hypoglycemia        Modesto Abraham MD  Endocrinology  [facility]

## 2022-04-21 NOTE — PT/OT/SLP PROGRESS
Physical Therapy Co Treatment    Patient Name:  Kamar Muñoz   MRN:  [id number]  *Co treatment of 2 skilled therapists indicated in order to maximize function and safety of Pt.  Recommendations:     Discharge Recommendations:  nursing facility, skilled   Discharge Equipment Recommendations: other (see comments) (tbd)   Barriers to discharge: Decreased caregiver support and requires increased assistance    Assessment:     Kamar Muñoz is a 76 y.o. male admitted with a medical diagnosis of Pulmonary cavitary lesion.  He presents with the following impairments/functional limitations:  weakness, impaired endurance, impaired self care skills, impaired functional mobilty, gait instability, impaired balance, impaired cognition, impaired skin. Kamar Muñoz would benefit from acute PT intervention to address listed functional deficits, provide patient/caregiver education, reduce fall risk, and maximize (I) and safety with functional mobility.    Pt continues to make progress towards goals and was participative throughout session. Pt able to perform bed mobility with SPV, sit to stand transfer with CGA and RW, and ambulated a total of ~120 ft with 1 standing rest break; used RW and CGA from PT. Pt remains a high fall risk 2/2 significant global deconditioning, and is unsafe to return home at this time 2/2 lack of caregiver support and deconditioning. Pt will continue to benefit from acute PT services in order to maximize safety and (I) with functional mobility.    After hospital discharge, pt would benefit from SNF to continue addressing therapy impairments.    Rehab Prognosis: Good; patient would benefit from acute skilled PT services to address these deficits and reach maximum level of function.      Plan:     During this hospitalization, patient to be seen 4 x/week to address the identified rehab impairments via gait training, therapeutic activities, therapeutic exercises, neuromuscular re-education and  progress toward the following goals:    · Plan of Care Expires:  04/21/22    This plan of care has been discussed with the patient, who was included in its development and is in agreement with the identified goals and treatment plan.     Subjective     Communicated with RN prior to session.  Patient agreeable to participate.     Chief Complaint: fatigue  Patient/Family Comments/goals: to go home  Pt pain prior to session: 0/10  Pt pain following session: 0/10    Objective:     Patient found left sidelying with peripheral IV, telemetry (visi)  upon PT entry to room.    General Precautions: Standard, diabetic, fall   Orthopedic Precautions:N/A   Braces: N/A     Functional Mobility:    Bed Mobility:  · Supine to Sit: Supervision or Set-up Assistance  · Sit to Supine: Supervision or Set-up Assistance  · Rolling L: Supervision or Set-up Assistance  · Rolling R: Supervision or Set-up Assistance  · Scooting anteriorly to EOB to plant feet on floor: Supervision or Set-up Assistance  · Scooting/Bridging in supine to HOB: Supervision or Set-up Assistance    Transfers:   · Sit to Stand Transfer: Contact Guard Assistance  from EOB with RW AD               Gait:  · Patient received gait training ~120 feet with Contact Guard Assistance and rolling walker  · Gait Assessment: decreased speed, step length, swing through, heel strike, forward flexed posture, narrow NICOLE and downward gaze.   · PT providing verbal and tactile cues for improved upright posture, gaze direction, AD management, and gait fluidity.     Balance:  · Static Sit: Stand-By Assist at EOB x ~10 minutes  · Static Stand: Contact-Guard Assist with Rolling walker      Therapeutic Activities/Exercises     Pt educated on importance of post acute PT to continue to improve strength and safety with functional mobility.     Patient educated on the importance of early mobility to prevent functional decline during hospital stay    Patient was instructed to utilize staff  assistance for mobility/transfers.  Patient was educated on PT POC and all questions answered within PT scope of practice.    Patient/caregiver able to verbalize understanding; will follow-up with pt/caregiver during current admit for additional questions/concerns within scope of practice.     White board updated.     AM-PAC 6 CLICK MOBILITY  Total Score:17     Patient left HOB elevated with all lines intact, call button in reach and RN notified.        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    Hyperlipidemia     Hypertension     Kidney stone     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 8/2/2012    STEMI involving right coronary artery 1/9/2022    Type II or unspecified type diabetes mellitus with neurological manifestations, uncontrolled(250.62) 3/8/2013    Urinary tract infection        Past Surgical History:   Procedure Laterality Date    BACK SURGERY      CATARACT EXTRACTION W/  INTRAOCULAR LENS IMPLANT Right     Per Dr Romero note 11/2018    COLONOSCOPY N/A 1/28/2019    Procedure: COLONOSCOPY Suprep;  Surgeon: Anh Johnson MD;  Location: [facility] ENDO;  Service: Endoscopy;  Laterality: N/A;    EYE SURGERY      HERNIA REPAIR      LEFT HEART CATHETERIZATION Left 1/9/2022    Procedure: CATHETERIZATION, HEART, LEFT;  Surgeon: Will Hurst III, MD;  Location: [facility] CATH LAB/EP;  Service: Cardiology;  Laterality: Left;    renal stones      SHOULDER OPEN ROTATOR CUFF REPAIR         GOALS:   Multidisciplinary Problems     Physical Therapy Goals        Problem: Physical Therapy    Goal Priority Disciplines Outcome Goal Variances Interventions   Physical Therapy Goal     PT, PT/OT Ongoing, Progressing     Description: Goals to met by 4/29/2022     1. Supine to sit with Modified  Closplint  2. Sit to supine with Modified Closplint  3. Sit to stand transfer with Modified Closplint  4. Bed to chair transfer with Modified Closplint using Rolling Walker  5. Gait  x 150 feet with Stand-by Assistance using Rolling Walker   6. Ascend/Descend 6 inch curb step with Contact Guard Assistance using LRAD.  7. Lower extremity exercise program x15 reps per Instruction, with supervision in order to facilitate improvement in functional independence                     Time Tracking:     PT Received On: 04/21/22  PT Start Time: 1002     PT Stop Time: 1027  PT Total Time (min): 25 min     Billable Minutes: Gait Training 15 and Therapeutic Activity 10      Hiram Stevenson, PT  04/21/2022

## 2022-04-22 PROBLEM — J15.9 BACTERIAL PNEUMONIA: Status: ACTIVE | Noted: 2022-04-22

## 2022-04-22 LAB
ALBUMIN SERPL BCP-MCNC: 2.4 G/DL (ref 3.5–5.2)
ALP SERPL-CCNC: 109 U/L (ref 55–135)
ALT SERPL W/O P-5'-P-CCNC: 35 U/L (ref 10–44)
ANION GAP SERPL CALC-SCNC: 9 MMOL/L (ref 8–16)
AST SERPL-CCNC: 62 U/L (ref 10–40)
BASOPHILS # BLD AUTO: 0.05 K/UL (ref 0–0.2)
BASOPHILS NFR BLD: 0.8 % (ref 0–1.9)
BILIRUB SERPL-MCNC: 0.4 MG/DL (ref 0.1–1)
BUN SERPL-MCNC: 14 MG/DL (ref 8–23)
CALCIUM SERPL-MCNC: 8.3 MG/DL (ref 8.7–10.5)
CHLORIDE SERPL-SCNC: 103 MMOL/L (ref 95–110)
CO2 SERPL-SCNC: 25 MMOL/L (ref 23–29)
CREAT SERPL-MCNC: 0.8 MG/DL (ref 0.5–1.4)
DIFFERENTIAL METHOD: ABNORMAL
EOSINOPHIL # BLD AUTO: 0.4 K/UL (ref 0–0.5)
EOSINOPHIL NFR BLD: 6 % (ref 0–8)
ERYTHROCYTE [DISTWIDTH] IN BLOOD BY AUTOMATED COUNT: 16 % (ref 11.5–14.5)
EST. GFR  (AFRICAN AMERICAN): >60 ML/MIN/1.73 M^2
EST. GFR  (NON AFRICAN AMERICAN): >60 ML/MIN/1.73 M^2
GLUCOSE SERPL-MCNC: 86 MG/DL (ref 70–110)
HCT VFR BLD AUTO: 34.8 % (ref 40–54)
HGB BLD-MCNC: 11.7 G/DL (ref 14–18)
IMM GRANULOCYTES # BLD AUTO: 0.01 K/UL (ref 0–0.04)
IMM GRANULOCYTES NFR BLD AUTO: 0.2 % (ref 0–0.5)
LYMPHOCYTES # BLD AUTO: 1.7 K/UL (ref 1–4.8)
LYMPHOCYTES NFR BLD: 27.6 % (ref 18–48)
MAGNESIUM SERPL-MCNC: 1.6 MG/DL (ref 1.6–2.6)
MCH RBC QN AUTO: 29.9 PG (ref 27–31)
MCHC RBC AUTO-ENTMCNC: 33.6 G/DL (ref 32–36)
MCV RBC AUTO: 89 FL (ref 82–98)
MONOCYTES # BLD AUTO: 0.6 K/UL (ref 0.3–1)
MONOCYTES NFR BLD: 9.4 % (ref 4–15)
NEUTROPHILS # BLD AUTO: 3.4 K/UL (ref 1.8–7.7)
NEUTROPHILS NFR BLD: 56 % (ref 38–73)
NRBC BLD-RTO: 0 /100 WBC
PHOSPHATE SERPL-MCNC: 2.9 MG/DL (ref 2.7–4.5)
PLATELET # BLD AUTO: 230 K/UL (ref 150–450)
PMV BLD AUTO: 9.7 FL (ref 9.2–12.9)
POCT GLUCOSE: 119 MG/DL (ref 70–110)
POCT GLUCOSE: 148 MG/DL (ref 70–110)
POCT GLUCOSE: 299 MG/DL (ref 70–110)
POCT GLUCOSE: 342 MG/DL (ref 70–110)
POCT GLUCOSE: 375 MG/DL (ref 70–110)
POCT GLUCOSE: 68 MG/DL (ref 70–110)
POCT GLUCOSE: 75 MG/DL (ref 70–110)
POTASSIUM SERPL-SCNC: 3.8 MMOL/L (ref 3.5–5.1)
PROT SERPL-MCNC: 5.7 G/DL (ref 6–8.4)
RBC # BLD AUTO: 3.91 M/UL (ref 4.6–6.2)
SODIUM SERPL-SCNC: 137 MMOL/L (ref 136–145)
WBC # BLD AUTO: 6.04 K/UL (ref 3.9–12.7)

## 2022-04-22 PROCEDURE — 83735 ASSAY OF MAGNESIUM: CPT | Performed by: INTERNAL MEDICINE

## 2022-04-22 PROCEDURE — 25000003 PHARM REV CODE 250: Performed by: INTERNAL MEDICINE

## 2022-04-22 PROCEDURE — 99232 SBSQ HOSP IP/OBS MODERATE 35: CPT | Mod: GC,,, | Performed by: STUDENT IN AN ORGANIZED HEALTH CARE EDUCATION/TRAINING PROGRAM

## 2022-04-22 PROCEDURE — 99232 PR SUBSEQUENT HOSPITAL CARE,LEVL II: ICD-10-PCS | Mod: GC,,, | Performed by: INTERNAL MEDICINE

## 2022-04-22 PROCEDURE — 51798 US URINE CAPACITY MEASURE: CPT

## 2022-04-22 PROCEDURE — 99232 SBSQ HOSP IP/OBS MODERATE 35: CPT | Mod: GC,,, | Performed by: INTERNAL MEDICINE

## 2022-04-22 PROCEDURE — 20600001 HC STEP DOWN PRIVATE ROOM

## 2022-04-22 PROCEDURE — 84100 ASSAY OF PHOSPHORUS: CPT | Performed by: INTERNAL MEDICINE

## 2022-04-22 PROCEDURE — 63600175 PHARM REV CODE 636 W HCPCS: Performed by: INTERNAL MEDICINE

## 2022-04-22 PROCEDURE — 99232 PR SUBSEQUENT HOSPITAL CARE,LEVL II: ICD-10-PCS | Mod: 95,,, | Performed by: INTERNAL MEDICINE

## 2022-04-22 PROCEDURE — 85025 COMPLETE CBC W/AUTO DIFF WBC: CPT | Performed by: INTERNAL MEDICINE

## 2022-04-22 PROCEDURE — 36415 COLL VENOUS BLD VENIPUNCTURE: CPT | Performed by: INTERNAL MEDICINE

## 2022-04-22 PROCEDURE — 99232 PR SUBSEQUENT HOSPITAL CARE,LEVL II: ICD-10-PCS | Mod: GC,,, | Performed by: STUDENT IN AN ORGANIZED HEALTH CARE EDUCATION/TRAINING PROGRAM

## 2022-04-22 PROCEDURE — 80053 COMPREHEN METABOLIC PANEL: CPT | Performed by: INTERNAL MEDICINE

## 2022-04-22 PROCEDURE — 99232 SBSQ HOSP IP/OBS MODERATE 35: CPT | Mod: 95,,, | Performed by: INTERNAL MEDICINE

## 2022-04-22 RX ADMIN — INSULIN ASPART 4 UNITS: 100 INJECTION, SOLUTION INTRAVENOUS; SUBCUTANEOUS at 01:04

## 2022-04-22 RX ADMIN — INSULIN ASPART 12 UNITS: 100 INJECTION, SOLUTION INTRAVENOUS; SUBCUTANEOUS at 01:04

## 2022-04-22 RX ADMIN — ONDANSETRON 8 MG: 2 INJECTION INTRAMUSCULAR; INTRAVENOUS at 09:04

## 2022-04-22 RX ADMIN — PANTOPRAZOLE SODIUM 40 MG: 40 TABLET, DELAYED RELEASE ORAL at 09:04

## 2022-04-22 RX ADMIN — APIXABAN 5 MG: 5 TABLET, FILM COATED ORAL at 09:04

## 2022-04-22 RX ADMIN — AMOXICILLIN AND CLAVULANATE POTASSIUM 1 TABLET: 875; 125 TABLET, FILM COATED ORAL at 09:04

## 2022-04-22 RX ADMIN — SENNOSIDES AND DOCUSATE SODIUM 1 TABLET: 50; 8.6 TABLET ORAL at 09:04

## 2022-04-22 RX ADMIN — ATORVASTATIN CALCIUM 40 MG: 20 TABLET, FILM COATED ORAL at 09:04

## 2022-04-22 RX ADMIN — METOPROLOL SUCCINATE 50 MG: 50 TABLET, EXTENDED RELEASE ORAL at 09:04

## 2022-04-22 RX ADMIN — POLYETHYLENE GLYCOL 3350 17 G: 17 POWDER, FOR SOLUTION ORAL at 09:04

## 2022-04-22 RX ADMIN — INSULIN ASPART 10 UNITS: 100 INJECTION, SOLUTION INTRAVENOUS; SUBCUTANEOUS at 06:04

## 2022-04-22 RX ADMIN — ONDANSETRON 8 MG: 2 INJECTION INTRAMUSCULAR; INTRAVENOUS at 12:04

## 2022-04-22 RX ADMIN — LACOSAMIDE 100 MG: 100 TABLET, FILM COATED ORAL at 09:04

## 2022-04-22 RX ADMIN — Medication 6 MG: at 09:04

## 2022-04-22 RX ADMIN — CLOPIDOGREL 75 MG: 75 TABLET, FILM COATED ORAL at 09:04

## 2022-04-22 RX ADMIN — AMIODARONE HYDROCHLORIDE 200 MG: 200 TABLET ORAL at 09:04

## 2022-04-22 RX ADMIN — TAMSULOSIN HYDROCHLORIDE 0.4 MG: 0.4 CAPSULE ORAL at 09:04

## 2022-04-22 RX ADMIN — INSULIN ASPART 12 UNITS: 100 INJECTION, SOLUTION INTRAVENOUS; SUBCUTANEOUS at 06:04

## 2022-04-22 NOTE — PROGRESS NOTES
[facility] - Telemetry [facility] Medicine  Telemedicine Progress Note    Patient Name: Kamar Muñoz  MRN: [id number]  Patient Class: IP- Inpatient   Admission Date: 4/11/2022  Length of Stay: 11 days  Attending Physician: Komal Huynh MD  Primary Care Provider: Basim Guerrero MD          Subjective:     Principal Problem:Pulmonary cavitary lesion        HPI:  Mr. Kamar Muñoz is a 76 y.o. male with a past medical history of HTN, Type 2 DM, CAD, STEMI, HLD, paroxysmal Afib, CVA, seizures and recurrent DKA.  He presented to [facility] ED on 4/11 with elevated blood glucose.  He was discharged from [facility] on 4/10 after being admitted for DKA on 4/5.  At time of exam patient is alert but altered and unable to provide any history.  Spoke with patient's daughter who states she is a primary caregiver at home and obtained history as well as review of chart.  Per family he was not feeling well after discharge to home and vomited several times on 4/11. Unknown characteristics of emesis. Finger stick at home read High with unreadable blood glucose.  At this time daughter gave him 14 units of insulin and sublingual zofran. Other than malaise and nausea patient was not exhibiting any other signs/symptoms at home.  In ER BG found to be 1015 with pCO2 6 and anion gap 35.  Hyperkalemic with K 7.0 and some EKG changes when compared to previous EKG.  Given calcium gluconate, and started on insulin gtt as well as subQ long acting insulin.      Critical Care Medicine consulted for DKA and admitted to MICU.        Overview/Hospital Course:  Admitted to MICU on 4/11 for DKA with BG >1000, pCO2 6, AG 35, and hyperkalemia.  Given Calcium gluconate and started on insulin gtt in ED.  Hyperkalemia not improved on repeat labs and bolused with IV insulin.  Infectious workup sent and broad spectrum abx started.  4/12 potassium improving with insulin. 4/13 Gap closed, insulin gtt turned off and switched to subq insulin.  Endocrine consulted for insulin mgmt. He was stepped down to  4/14.    Since step down stable. Advanced diet. Endocrine following and titrating insulin. Poor po intake making it difficult to adjust insulin. CT chest with cavitary lesion, pulmonary and ID consulted. SNF once medically stable for dc.        This encounter was provided through telemedicine.  Patient was transferred to the telemedicine service on:  04/22/2022   The [facility]: [facility] admitted 4/11/2022  8:55 PM.  Present with the patient at the time of the telemed/virtual assessment: Telepresenter    Interval History/Overnight Events:   Clinical record since admit reviewed.    Glucoses decreased; poor appetite which is unchanged from previous - he does not like the food so has not been eating very well and this is been a problem for several weeks; patient cognitively improved from when I last saw him but remains fatigued with urinary incontinence; he had nodules that he needs more PT/OT but does not feel he can afford to go to a skilled nursing facility with a co-pay required; he denies having shortness of breath or cough and has been unable to give a sputum sample    Patient's son updated on plan of care.    Review of Systems   Constitutional:  Positive for activity change and fatigue.   Respiratory:  Negative for cough.    Cardiovascular:  Negative for leg swelling.   Gastrointestinal:  Negative for diarrhea and vomiting.   Musculoskeletal:  Positive for myalgias (buttock pain making it difficulty to sit).   Neurological:  Positive for weakness.   Psychiatric/Behavioral:  Negative for behavioral problems and confusion.       Inpatient Medications:  Scheduled Meds:   amiodarone  200 mg Oral Daily    amoxicillin-clavulanate 875-125mg  1 tablet Oral Q12H    apixaban  5 mg Oral BID    atorvastatin  40 mg Oral Daily    clopidogreL  75 mg Oral Daily    insulin aspart U-100  8-12 Units Subcutaneous TIDWM    insulin detemir U-100  7  Units Subcutaneous BID    lacosamide  100 mg Oral Q12H    metoprolol succinate  50 mg Oral Daily    pantoprazole  40 mg Oral Daily    polyethylene glycol  17 g Oral Daily    senna-docusate 8.6-50 mg  1 tablet Oral BID    tamsulosin  0.4 mg Oral Daily     Continuous Infusions:  PRN Meds:.acetaminophen, calcium carbonate, cloNIDine, dextrose 10%, diphenhydrAMINE, glucagon (human recombinant), hydrALAZINE, insulin aspart U-100, insulin aspart U-100, melatonin, ondansetron, prochlorperazine, senna, simethicone, sodium chloride 0.9%      Objective:     Temp:  [97.7 °F (36.5 °C)-98.3 °F (36.8 °C)] 97.9 °F (36.6 °C)  Pulse:  [52-63] 63  Resp:  [17-19] 17  SpO2:  [93 %-97 %] 93 %  BP: (117-148)/(62-82) 136/82      Intake/Output Summary (Last 24 hours) at 4/22/2022 0959  Last data filed at 4/22/2022 0944  Gross per 24 hour   Intake 420 ml   Output --   Net 420 ml        Body mass index is 25.09 kg/m².    Physical Exam  Vitals and nursing note reviewed.   Constitutional:       General: He is not in acute distress.     Appearance: He is normal weight. He is ill-appearing.   HENT:      Head: Normocephalic and atraumatic.      Right Ear: Hearing normal.      Left Ear: Hearing normal.      Nose: Nose normal.   Eyes:      General: No scleral icterus.        Right eye: No discharge.         Left eye: No discharge.      Extraocular Movements: Extraocular movements intact.   Cardiovascular:      Rate and Rhythm: Normal rate.   Pulmonary:      Effort: Pulmonary effort is normal. No accessory muscle usage or respiratory distress.   Skin:     Findings: No rash.   Neurological:      General: No focal deficit present.      Mental Status: He is alert and oriented to person, place, and time.      Cranial Nerves: No cranial nerve deficit.      Motor: No weakness.   Psychiatric:         Attention and Perception: Attention normal.         Mood and Affect: Affect is flat.         Speech: Speech normal.         Behavior: Behavior is  [facility].        Labs:  Recent Results (from the past 24 hour(s))   POCT glucose    Collection Time: 04/21/22  1:41 PM   Result Value Ref Range    POCT Glucose 300 (H) 70 - 110 mg/dL   POCT glucose    Collection Time: 04/21/22  4:58 PM   Result Value Ref Range    POCT Glucose 283 (H) 70 - 110 mg/dL   POCT glucose    Collection Time: 04/21/22  5:02 PM   Result Value Ref Range    POCT Glucose 279 (H) 70 - 110 mg/dL   POCT glucose    Collection Time: 04/21/22  8:54 PM   Result Value Ref Range    POCT Glucose 184 (H) 70 - 110 mg/dL   POCT glucose    Collection Time: 04/22/22 12:06 AM   Result Value Ref Range    POCT Glucose 148 (H) 70 - 110 mg/dL   CBC Auto Differential    Collection Time: 04/22/22  2:38 AM   Result Value Ref Range    WBC 6.04 3.90 - 12.70 K/uL    RBC 3.91 (L) 4.60 - 6.20 M/uL    Hemoglobin 11.7 (L) 14.0 - 18.0 g/dL    Hematocrit 34.8 (L) 40.0 - 54.0 %    MCV 89 82 - 98 fL    MCH 29.9 27.0 - 31.0 pg    MCHC 33.6 32.0 - 36.0 g/dL    RDW 16.0 (H) 11.5 - 14.5 %    Platelets 230 150 - 450 K/uL    MPV 9.7 9.2 - 12.9 fL    Immature Granulocytes 0.2 0.0 - 0.5 %    Gran # (ANC) 3.4 1.8 - 7.7 K/uL    Immature Grans (Abs) 0.01 0.00 - 0.04 K/uL    Lymph # 1.7 1.0 - 4.8 K/uL    Mono # 0.6 0.3 - 1.0 K/uL    Eos # 0.4 0.0 - 0.5 K/uL    Baso # 0.05 0.00 - 0.20 K/uL    nRBC 0 0 /100 WBC    Gran % 56.0 38.0 - 73.0 %    Lymph % 27.6 18.0 - 48.0 %    Mono % 9.4 4.0 - 15.0 %    Eosinophil % 6.0 0.0 - 8.0 %    Basophil % 0.8 0.0 - 1.9 %    Differential Method Automated    Comprehensive Metabolic Panel    Collection Time: 04/22/22  2:38 AM   Result Value Ref Range    Sodium 137 136 - 145 mmol/L    Potassium 3.8 3.5 - 5.1 mmol/L    Chloride 103 95 - 110 mmol/L    CO2 25 23 - 29 mmol/L    Glucose 86 70 - 110 mg/dL    BUN 14 8 - 23 mg/dL    Creatinine 0.8 0.5 - 1.4 mg/dL    Calcium 8.3 (L) 8.7 - 10.5 mg/dL    Total Protein 5.7 (L) 6.0 - 8.4 g/dL    Albumin 2.4 (L) 3.5 - 5.2 g/dL    Total Bilirubin 0.4 0.1 - 1.0 mg/dL     Alkaline Phosphatase 109 55 - 135 U/L    AST 62 (H) 10 - 40 U/L    ALT 35 10 - 44 U/L    Anion Gap 9 8 - 16 mmol/L    eGFR if African American >60.0 >60 mL/min/1.73 m^2    eGFR if non African American >60.0 >60 mL/min/1.73 m^2   Magnesium    Collection Time: 04/22/22  2:38 AM   Result Value Ref Range    Magnesium 1.6 1.6 - 2.6 mg/dL   Phosphorus    Collection Time: 04/22/22  2:38 AM   Result Value Ref Range    Phosphorus 2.9 2.7 - 4.5 mg/dL   POCT glucose    Collection Time: 04/22/22  4:03 AM   Result Value Ref Range    POCT Glucose 75 70 - 110 mg/dL        Lab Results   Component Value Date    [id number] Negative 04/05/2022       Recent Labs   Lab 04/20/22  0621 04/21/22  0535 04/22/22  0238   WBC 5.88 4.82 6.04   LYMPH 23.6  1.4 28.2  1.4 27.6  1.7   HGB 11.5* 11.8* 11.7*   HCT 35.5* 36.0* 34.8*    210 230     Recent Labs   Lab 04/20/22  0621 04/21/22  0535 04/22/22  0238    134* 137   K 3.7 3.8 3.8    100 103   CO2 27 25 25   BUN 16 16 14   CREATININE 0.9 0.8 0.8   GLU 99 134* 86   CALCIUM 8.3* 8.5* 8.3*   MG 1.6 1.5* 1.6   PHOS 3.8 3.1 2.9     Recent Labs   Lab 04/20/22  0621 04/21/22  0535 04/22/22  0238   ALKPHOS 113 116 109   ALT 17 22 35   AST 21 37 62*   ALBUMIN 2.4* 2.4* 2.4*   PROT 6.0 5.9* 5.7*   BILITOT 0.5 0.4 0.4        No results for input(s): DDIMER, FERRITIN, CRP, LDH, BNP, TROPONINI, CPK in the last 72 hours.    Invalid input(s): PROCALCITONIN    All labs within the last 24 hours were reviewed.     Microbiology:  Microbiology Results (last 7 days)       Procedure Component Value Units Date/Time    Culture, Respiratory with Gram Stain [[id number]]     Order Status: No result Specimen: Respiratory     AFB Culture & Smear [[id number]]     Order Status: No result Specimen: Sputum     Blood culture [[id number]] Collected: 04/12/22 0008    Order Status: Completed Specimen: Blood from Peripheral, Hand, Right Updated: 04/17/22 0612     Blood Culture, Routine No growth after 5 days.     Blood culture [[id number]] Collected: 04/12/22 0008    Order Status: Completed Specimen: Blood from Peripheral, Antecubital, Left Updated: 04/17/22 0612     Blood Culture, Routine No growth after 5 days.              Imaging  ECG Results              EKG 12-lead (Final result)  Result time 04/12/22 12:51:09      Final result by Interface, Lab In [facility] (04/12/22 12:51:09)                   Narrative:    Test Reason : R73.9,    Vent. Rate : 118 BPM     Atrial Rate : 111 BPM     P-R Int : 000 ms          QRS Dur : 162 ms      QT Int : 436 ms       P-R-T Axes : 000 -14 052 degrees     QTc Int : 611 ms    Wide QRS rhythm  Right bundle branch block  ST elevation anterior leads differential includes anterior STEMI vs,  repolarization abnormality  Abnormal ECG  When compared with ECG of 05-APR-2022 11:41,  Wide QRS rhythm has replaced Sinus rhythm  Vent. rate has increased BY  48 BPM  Confirmed by Megha Stock MD ([id number]) on 4/12/2022 12:51:00 PM    Referred By: [email or web address]   SELF           Confirmed By:Megha Stock MD                                    Results for orders placed during the hospital encounter of 01/25/22    Echo    Interpretation Summary  · There is abnormal septal wall motion.  · The left ventricle is normal in size with low normal systolic function.  · The estimated ejection fraction is 50%.  · Normal left ventricular diastolic function.  · Mild left atrial enlargement.  · Normal right ventricular size with normal right ventricular systolic function.  · Mild mitral regurgitation.  · There are segmental left ventricular wall motion abnormalities.  · Mild tricuspid regurgitation.  · Elevated central venous pressure (15 mmHg).      CT Chest Without Contrast  Narrative: EXAMINATION:  CT CHEST WITHOUT CONTRAST    CLINICAL HISTORY:  pulmonary nodule, follow up;    TECHNIQUE:  Low dose axial images, sagittal and coronal reformations were obtained from the thoracic inlet to the lung bases. Contrast was not  administered.    COMPARISON:  Chest x-ray 04/11/2022, 03/05/2022; CT chest 12/01/2021, 09/23/2021, 04/14/2021.    FINDINGS:  SOFT TISSUES:  Unremarkable.    HEART AND MEDIASTINUM:  Several slightly prominent mediastinal lymph nodes, including 1.6 cm level 4R right lower paratracheal node (axial series 2, image 51), grossly stable compared to prior.  Multi-vessel coronary arterial calcific plaques ranging from mild to moderate-severe.  Scattered calcific plaques in the visualized aorta.    PLEURA:  Unremarkable.    LUNGS AND AIRWAYS:  Airways are patent.  Advanced bilateral centrilobular and paraseptal emphysema with subpleural reticulations and bandlike opacities in the bilateral upper lobes with interval improvement in interlobular septal thickening.    There are new ground-glass opacities in the dependent portions of the right upper lobe and the right lower lobe, consider aspiration.    There is a new 7 mm right lower lobe posterior segment nodule (4-331)    There are several stable to mildly improved, bilateral solid pulmonary nodules with index lesions as follows:    *Evolving appearance of a right upper lobe posterior segment cavitary lesion with internal dependent mass; the cavity demonstrates a thinner wall and has decreased in diameter.  There is adjacent contracture of parenchyma.  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  The central debris is decreased in size,, now measuring 1.0 cm (axial series 4, image 81), previously 2.0 cm.  *Triangular 1.4 cm nodule in the posterior segment of the right upper lobe (axial series 4, image 181) previously 1.8 cm  *Stable appearance of previously described 1.0 cm solid nodule in the posterior segment of the right upper lobe (4-277, prior exam 6-290).  *Stable 12 mm right upper lobe triangular opacity (4-203, prior 6-183).  *Stable 0.9 cm solid nodule in the superior segment of the right lower lobe (axial series 4, image 37), previously 0.9  "cm.  ESOPHAGUS:  Unremarkable.    UPPER ABDOMEN (limited):  Left renal cortical hypodensities, likely simple cysts.  Otherwise unremarkable.    BONES: Degenerative changes of the thoracic spine.  No fractures or focal osseous lesions.  Impression: 1. There are several stable pulmonary nodules as described above.  2. There are new ground-glass opacities in the dependent portions of the right lung, consider aspiration.  3. There is a new 7 mm right lower lobe posterior segment nodule.  This may reflect infectious/inflammatory etiology given other findings in the chest.  Clinical considerations will determine if further investigation of this finding is to be pursued.    Electronically signed by resident: Ernesto Lui  Date:    04/19/2022  Time:    08:25    Electronically signed by: Katt Rubi  Date:    04/19/2022  Time:    10:02      All imaging within the last 24 hours was reviewed.       Discharge Planning   ALLIE: 4/25/2022     Code Status: DNR   Is the patient medically ready for discharge?: No    Reason for patient still in hospital (select all that apply): Patient trending condition, Treatment, Consult recommendations, and Pending disposition  Discharge Plan A: Skilled Nursing Facility   Discharge Delays: None known at this time       Assessment/Plan:      * Pulmonary cavitary lesion  - As per CT, noted about a month ago  - Repeat CT Chest without contrast obtained on 4/19 revealed "an evolving appearance of a right upper lobe posterior segment cavitary lesion with internal dependent mass; the cavity demonstrates a thinner wall and has decreased in diameter.  There is adjacent contracture of parenchyma.  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  The central debris is decreased in size,, now measuring 1.0 cm (axial series 4, image 81), previously 2.0 cm, and various other nodules".   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  - 1/11/2022 " Culture with MAC pending susceptibilities   - 1/11/2022 2nd AFB culture with pending results  - Differential includes MAC, aspergillosis, chronic aspiration and less likely malignancy   - Pulmonary rec Augmentin x 7 days   - Induced sputum and send for cultures ordered, and AFB - has not been able to supply sample  - ID consulted and recommend repeat  - Poor candidate for surgical intervention given he is on triple therapy (eliquis + DAPT) for both afib and recent STEMI with LAUREN stent placed in January  - suspect his weight loss and recent decline worsened by underlying infection  - Repeat Chest CT in 3 months - 7/2022   - No indication for bronchoscopy at this time  - Can continue follow up with Dr. Louie outpatient.   - On RA and denies respiratory symptoms      Bacterial pneumonia  Complete 7 days of augmentin      Goals of care, counseling/discussion  - DNR as per ICU    Functional urinary incontinence  Remains requiring adult diapers  Post for residual ordered to assess for retention  Initiate time toileting for bladder training      Severe malnutrition  Nutrition consulted. Most recent weight and BMI monitored- Body mass index is 25.09 kg/m².       Malnutrition (Moderate to Severe)  Weight Loss (Malnutrition): greater than 7.5% in 3 months  Energy Intake (Malnutrition): less than 75% for greater than or equal to 1 month              Measurements:  Wt Readings from Last 1 Encounters:   04/19/22 81.6 kg (179 lb 14.3 oz)   Body mass index is 25.09 kg/m².    Recommendations: Recommendation/Intervention: 1. Continue current Diabetic diet + ONS. 2. RD to monitor & follow-up.  Goals: Meet % EEN, EPN by RD f/u date    Patient has been screened and assessed by RD. RD will follow patient.    Discharge planning issues  PT/OT recommends skilled nursing facility ongoing therapy; patient acknowledges and agrees with the need for ongoing therapy but unfortunately he is required to pay a co-pay for further SNF days.   He feels his only options to go home with limited sitter assistance and home health; his son has made arrangements for him to go to his preferred facility, [facility] but the patient feels he cannot manage the financial implications of going there; patient's son is committed to getting patient to [facility] which is likely his safest disposition      Ketosis-prone diabetes mellitus  - Interval history and physical exam findings as described above  - DKA now resolved  - Working to optimize BG control  - Endocrine following and adjusting insulin regimen as needed  - SSI provided for corrective dosing  - DXTs as ordered  - Hypoglycemic protocol in effect  -Endocrine following due to erratic glucoses    Focal seizures  - History of seizures treated with lacosamide.    - Continue home lacosamide    Coronary artery disease  - Stable  - Continue home regimen of aspirin, atorvastatin, clopidogrel, losartan, and metoprolol      Paroxysmal atrial fibrillation  - History of afib.    - Continue home regimen of amiodarone, apixaban, and metoprolol        Essential hypertension  - resume home meds as tolerates        VTE Risk Mitigation (From admission, onward)         Ordered     apixaban tablet 5 mg  2 times daily         04/20/22 1209     IP VTE HIGH RISK PATIENT  Once         04/11/22 2342     Place sequential compression device  Until discontinued         04/11/22 2138                  I have assessed these findings virtually using a telemed platform and with assistance of the bedside nurse.          The attending portion of this evaluation, treatment, and documentation was performed per Komal Huynh MD via Telemedicine AudioVisual using the secure Visual.ly software platform with 2 way audio/video. The provider was located off-site and the patient is located in the hospital. The aforementioned video software was utilized to document the relevant history and physical exam    Komal Huynh MD  Department of  [facility] Medicine   [facility] - Telemetry Stepdown

## 2022-04-22 NOTE — ASSESSMENT & PLAN NOTE
"- As per CT, noted about a month ago  - Repeat CT Chest without contrast obtained on 4/19 revealed "an evolving appearance of a right upper lobe posterior segment cavitary lesion with internal dependent mass; the cavity demonstrates a thinner wall and has decreased in diameter.  There is adjacent contracture of parenchyma.  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  The central debris is decreased in size,, now measuring 1.0 cm (axial series 4, image 81), previously 2.0 cm, and various other nodules".   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  - 1/11/2022 Culture with MAC pending susceptibilities   - 1/11/2022 2nd AFB culture with pending results  - Differential includes MAC, aspergillosis, chronic aspiration and less likely malignancy   - Pulmonary rec Augmentin x 7 days   - Induced sputum and send for cultures ordered, and AFB - has not been able to supply sample  - ID consulted and recommend repeat  - Poor candidate for surgical intervention given he is on triple therapy (eliquis + DAPT) for both afib and recent STEMI with LAUREN stent placed in January  - suspect his weight loss and recent decline worsened by underlying infection  - Repeat Chest CT in 3 months - 7/2022   - No indication for bronchoscopy at this time  - Can continue follow up with Dr. Louie outpatient.   - On RA and denies respiratory symptoms    "

## 2022-04-22 NOTE — ASSESSMENT & PLAN NOTE
Key History and Diagnostic Findings  - A1c of 9.7 on 2022 from 11.5 on 2022  - Home Regimen: Levemir 8 units daily, aspart 8 units TIDWM  - Weight based dosin kg x 0.5 = 41 TDD x 0.5 = 20 basal / 20 prandial  - 1700/TDD = 41 (estimated insulin sensitivity factor)  - 450/TDD = 11 (estimated starting carb ratio for prandial dosing)  - Glucose Goals: 160-200mg/dL  - 24 hour glucose trend: blood sugars tight in the morning    Plan  - decrease levemir to 6 units twice daily  - changed yesterday aspart to 8-12 units TIDWM with moderate correction scale depending on his meal intake  - Hypoglycemia protocol in place  - If blood glucose greater than 300, please ask patient not to eat food or drink anything other than water until correctional insulin has brought it back below 250  - Please ensure patient receives their p.r.n. insulin aspart if they eat a snack with more than 30 g of carbohydrate  - Daughter wishes to pursue skilled nursing facility for discharge as she states he cannot be adequately taken care of at home

## 2022-04-22 NOTE — PLAN OF CARE
04/22/22 1001   Post-Acute Status   Post-Acute Authorization Placement   Post-Acute Placement Status Pending post-acute provider review/more information requested   Patient does not have any full insurance coverage SNF days. Patient has 78 insurance co-pay SNF days, co-pay is $178.00 per day.    [employer] SNF list was emailed to patient's son, Kamar [phone] ([email or web address]), yesterday 4/21/22 for review.    Contacted patient's son and began to discuss discharge planning. Kamar reports he/family are in crisis mode due to change in status with patient's spouse last night (spouse is currently in the hospital as well). Kamar reports he will review the list and options today, and will return call to  this afternoon to continue discussing discharge planning.    Met with patient at bedside and notified of co-pay SNF days and costs. Patient reports he will likely not be able to afford the co-pay if his spouse also has the same needs/costs.    [facility] reviewed, several denials noted.    [facility] KHALIL- attempted to contact admissions and left voicemail  [facility]- attempted to contact admissions, unable to leave voicemail, message sent in [facility] requesting call back  [facility]iguel- attempted to contact admissions and left voicemail    3:21 PM  Spoke with Annita rodriguez [place] who reports she will return call regarding status of patient's referral.    3:39 PM  Received call from patient's son who reports patient is refusing to supply [facility] SNF with financial information that would be needed for admission. Son also requesting to speak with MD regarding patient's medical status.    Updated MD who reports she will call son after rounds today.    Bess Mathews, LMSW [facility]  Ext. [phone]

## 2022-04-22 NOTE — SUBJECTIVE & OBJECTIVE
Interval History: on RA. Clinically unchanged. Aspergillus serologies negative.     Objective:     Vital Signs (Most Recent):  Temp: 97.9 °F (36.6 °C) (04/22/22 1138)  Pulse: 64 (04/22/22 1138)  Resp: 18 (04/22/22 1138)  BP: 138/64 (04/22/22 1138)  SpO2: (!) 93 % (04/22/22 1138)   Vital Signs (24h Range):  Temp:  [97.7 °F (36.5 °C)-98.3 °F (36.8 °C)] 97.9 °F (36.6 °C)  Pulse:  [52-64] 64  Resp:  [17-19] 18  SpO2:  [93 %-97 %] 93 %  BP: (117-148)/(62-82) 138/64     Weight: 81.6 kg (179 lb 14.3 oz)  Body mass index is 25.09 kg/m².      Intake/Output Summary (Last 24 hours) at 4/22/2022 1311  Last data filed at 4/22/2022 0944  Gross per 24 hour   Intake 660 ml   Output --   Net 660 ml       Physical Exam  Vitals and nursing note reviewed.   Constitutional:       General: He is not in acute distress.     Appearance: He is well-developed.   HENT:      Head: Normocephalic and atraumatic.   Eyes:      Conjunctiva/sclera: Conjunctivae normal.      Pupils: Pupils are equal, round, and reactive to light.   Cardiovascular:      Rate and Rhythm: Normal rate and regular rhythm.      Heart sounds: Normal heart sounds.   Pulmonary:      Effort: Pulmonary effort is normal.      Breath sounds: Normal breath sounds.   Abdominal:      General: Bowel sounds are normal.      Palpations: Abdomen is soft.      Tenderness: There is no abdominal tenderness.   Musculoskeletal:      Right lower leg: No edema.      Left lower leg: No edema.   Neurological:      Mental Status: He is alert and oriented to person, place, and time.   Psychiatric:         Behavior: Behavior normal.         Thought Content: Thought content normal.         Judgment: Judgment normal.       Vents:       Lines/Drains/Airways       Peripheral Intravenous Line  Duration                  Peripheral IV - Single Lumen 04/11/22 6158 20 G Right Antecubital 10 days                    Significant Labs:    CBC/Anemia Profile:  Recent Labs   Lab 04/21/22  0535 04/22/22  0238   WBC  4.82 6.04   HGB 11.8* 11.7*   HCT 36.0* 34.8*    230   MCV 89 89   RDW 16.0* 16.0*        Chemistries:  Recent Labs   Lab 04/21/22  0535 04/22/22  0238   * 137   K 3.8 3.8    103   CO2 25 25   BUN 16 14   CREATININE 0.8 0.8   CALCIUM 8.5* 8.3*   ALBUMIN 2.4* 2.4*   PROT 5.9* 5.7*   BILITOT 0.4 0.4   ALKPHOS 116 109   ALT 22 35   AST 37 62*   MG 1.5* 1.6   PHOS 3.1 2.9       All pertinent labs within the past 24 hours have been reviewed.    Significant Imaging:  I have reviewed all pertinent imaging results/findings within the past 24 hours.

## 2022-04-22 NOTE — ASSESSMENT & PLAN NOTE
Remains requiring adult diapers  Post for residual ordered to assess for retention  Initiate time toileting for bladder training

## 2022-04-22 NOTE — ASSESSMENT & PLAN NOTE
- Interval history and physical exam findings as described above  - DKA now resolved  - Working to optimize BG control  - Endocrine following and adjusting insulin regimen as needed  - SSI provided for corrective dosing  - DXTs as ordered  - Hypoglycemic protocol in effect  -Endocrine following due to erratic glucoses

## 2022-04-22 NOTE — SUBJECTIVE & OBJECTIVE
This encounter was provided through telemedicine.  Patient was transferred to the telemedicine service on:  04/22/2022   The patient location is: [id number] A admitted 4/11/2022  8:55 PM.  Present with the patient at the time of the telemed/virtual assessment: Telepresenter    Interval History/Overnight Events:   Clinical record since admit reviewed.    Glucoses decreased; poor appetite which is unchanged from previous - he does not like the food so has not been eating very well and this is been a problem for several weeks; patient cognitively improved from when I last saw him but remains fatigued with urinary incontinence; he had nodules that he needs more PT/OT but does not feel he can afford to go to a skilled nursing facility with a co-pay required; he denies having shortness of breath or cough and has been unable to give a sputum sample    Patient's son updated on plan of care.    Review of Systems   Constitutional:  Positive for activity change and fatigue.   Respiratory:  Negative for cough.    Cardiovascular:  Negative for leg swelling.   Gastrointestinal:  Negative for diarrhea and vomiting.   Musculoskeletal:  Positive for myalgias (buttock pain making it difficulty to sit).   Neurological:  Positive for weakness.   Psychiatric/Behavioral:  Negative for behavioral problems and confusion.       Inpatient Medications:  Scheduled Meds:   amiodarone  200 mg Oral Daily    amoxicillin-clavulanate 875-125mg  1 tablet Oral Q12H    apixaban  5 mg Oral BID    atorvastatin  40 mg Oral Daily    clopidogreL  75 mg Oral Daily    insulin aspart U-100  8-12 Units Subcutaneous TIDWM    insulin detemir U-100  7 Units Subcutaneous BID    lacosamide  100 mg Oral Q12H    metoprolol succinate  50 mg Oral Daily    pantoprazole  40 mg Oral Daily    polyethylene glycol  17 g Oral Daily    senna-docusate 8.6-50 mg  1 tablet Oral BID    tamsulosin  0.4 mg Oral Daily     Continuous Infusions:  PRN Meds:.acetaminophen, calcium  carbonate, cloNIDine, dextrose 10%, diphenhydrAMINE, glucagon (human recombinant), hydrALAZINE, insulin aspart U-100, insulin aspart U-100, melatonin, ondansetron, prochlorperazine, senna, simethicone, sodium chloride 0.9%      Objective:     Temp:  [97.7 °F (36.5 °C)-98.3 °F (36.8 °C)] 97.9 °F (36.6 °C)  Pulse:  [52-63] 63  Resp:  [17-19] 17  SpO2:  [93 %-97 %] 93 %  BP: (117-148)/(62-82) 136/82      Intake/Output Summary (Last 24 hours) at 4/22/2022 0959  Last data filed at 4/22/2022 0944  Gross per 24 hour   Intake 420 ml   Output --   Net 420 ml        Body mass index is 25.09 kg/m².    Physical Exam  Vitals and nursing note reviewed.   Constitutional:       General: He is not in acute distress.     Appearance: He is normal weight. He is ill-appearing.   HENT:      Head: Normocephalic and atraumatic.      Right Ear: Hearing normal.      Left Ear: Hearing normal.      Nose: Nose normal.   Eyes:      General: No scleral icterus.        Right eye: No discharge.         Left eye: No discharge.      Extraocular Movements: Extraocular movements intact.   Cardiovascular:      Rate and Rhythm: Normal rate.   Pulmonary:      Effort: Pulmonary effort is normal. No accessory muscle usage or respiratory distress.   Skin:     Findings: No rash.   Neurological:      General: No focal deficit present.      Mental Status: He is alert and oriented to person, place, and time.      Cranial Nerves: No cranial nerve deficit.      Motor: No weakness.   Psychiatric:         Attention and Perception: Attention normal.         Mood and Affect: Affect is flat.         Speech: Speech normal.         Behavior: Behavior is cooperative.        Labs:  Recent Results (from the past 24 hour(s))   POCT glucose    Collection Time: 04/21/22  1:41 PM   Result Value Ref Range    POCT Glucose 300 (H) 70 - 110 mg/dL   POCT glucose    Collection Time: 04/21/22  4:58 PM   Result Value Ref Range    POCT Glucose 283 (H) 70 - 110 mg/dL   POCT glucose     Collection Time: 04/21/22  5:02 PM   Result Value Ref Range    POCT Glucose 279 (H) 70 - 110 mg/dL   POCT glucose    Collection Time: 04/21/22  8:54 PM   Result Value Ref Range    POCT Glucose 184 (H) 70 - 110 mg/dL   POCT glucose    Collection Time: 04/22/22 12:06 AM   Result Value Ref Range    POCT Glucose 148 (H) 70 - 110 mg/dL   CBC Auto Differential    Collection Time: 04/22/22  2:38 AM   Result Value Ref Range    WBC 6.04 3.90 - 12.70 K/uL    RBC 3.91 (L) 4.60 - 6.20 M/uL    Hemoglobin 11.7 (L) 14.0 - 18.0 g/dL    Hematocrit 34.8 (L) 40.0 - 54.0 %    MCV 89 82 - 98 fL    MCH 29.9 27.0 - 31.0 pg    MCHC 33.6 32.0 - 36.0 g/dL    RDW 16.0 (H) 11.5 - 14.5 %    Platelets 230 150 - 450 K/uL    MPV 9.7 9.2 - 12.9 fL    Immature Granulocytes 0.2 0.0 - 0.5 %    Gran # (ANC) 3.4 1.8 - 7.7 K/uL    Immature Grans (Abs) 0.01 0.00 - 0.04 K/uL    Lymph # 1.7 1.0 - 4.8 K/uL    Mono # 0.6 0.3 - 1.0 K/uL    Eos # 0.4 0.0 - 0.5 K/uL    Baso # 0.05 0.00 - 0.20 K/uL    nRBC 0 0 /100 WBC    Gran % 56.0 38.0 - 73.0 %    Lymph % 27.6 18.0 - 48.0 %    Mono % 9.4 4.0 - 15.0 %    Eosinophil % 6.0 0.0 - 8.0 %    Basophil % 0.8 0.0 - 1.9 %    Differential Method Automated    Comprehensive Metabolic Panel    Collection Time: 04/22/22  2:38 AM   Result Value Ref Range    Sodium 137 136 - 145 mmol/L    Potassium 3.8 3.5 - 5.1 mmol/L    Chloride 103 95 - 110 mmol/L    CO2 25 23 - 29 mmol/L    Glucose 86 70 - 110 mg/dL    BUN 14 8 - 23 mg/dL    Creatinine 0.8 0.5 - 1.4 mg/dL    Calcium 8.3 (L) 8.7 - 10.5 mg/dL    Total Protein 5.7 (L) 6.0 - 8.4 g/dL    Albumin 2.4 (L) 3.5 - 5.2 g/dL    Total Bilirubin 0.4 0.1 - 1.0 mg/dL    Alkaline Phosphatase 109 55 - 135 U/L    AST 62 (H) 10 - 40 U/L    ALT 35 10 - 44 U/L    Anion Gap 9 8 - 16 mmol/L    eGFR if African American >60.0 >60 mL/min/1.73 m^2    eGFR if non African American >60.0 >60 mL/min/1.73 m^2   Magnesium    Collection Time: 04/22/22  2:38 AM   Result Value Ref Range    Magnesium 1.6 1.6 -  2.6 mg/dL   Phosphorus    Collection Time: 04/22/22  2:38 AM   Result Value Ref Range    Phosphorus 2.9 2.7 - 4.5 mg/dL   POCT glucose    Collection Time: 04/22/22  4:03 AM   Result Value Ref Range    POCT Glucose 75 70 - 110 mg/dL        Lab Results   Component Value Date    [id number] Negative 04/05/2022       Recent Labs   Lab 04/20/22  0621 04/21/22  0535 04/22/22  0238   WBC 5.88 4.82 6.04   LYMPH 23.6  1.4 28.2  1.4 27.6  1.7   HGB 11.5* 11.8* 11.7*   HCT 35.5* 36.0* 34.8*    210 230     Recent Labs   Lab 04/20/22 0621 04/21/22  0535 04/22/22  0238    134* 137   K 3.7 3.8 3.8    100 103   CO2 27 25 25   BUN 16 16 14   CREATININE 0.9 0.8 0.8   GLU 99 134* 86   CALCIUM 8.3* 8.5* 8.3*   MG 1.6 1.5* 1.6   PHOS 3.8 3.1 2.9     Recent Labs   Lab 04/20/22  0621 04/21/22  0535 04/22/22  0238   ALKPHOS 113 116 109   ALT 17 22 35   AST 21 37 62*   ALBUMIN 2.4* 2.4* 2.4*   PROT 6.0 5.9* 5.7*   BILITOT 0.5 0.4 0.4        No results for input(s): DDIMER, FERRITIN, CRP, LDH, BNP, TROPONINI, CPK in the last 72 hours.    Invalid input(s): PROCALCITONIN    All labs within the last 24 hours were reviewed.     Microbiology:  Microbiology Results (last 7 days)       Procedure Component Value Units Date/Time    Culture, Respiratory with Gram Stain [[id number]]     Order Status: No result Specimen: Respiratory     AFB Culture & Smear [[id number]]     Order Status: No result Specimen: Sputum     Blood culture [[id number]] Collected: 04/12/22 0008    Order Status: Completed Specimen: Blood from Peripheral, Hand, Right Updated: 04/17/22 0612     Blood Culture, Routine No growth after 5 days.    Blood culture [[id number]] Collected: 04/12/22 0008    Order Status: Completed Specimen: Blood from Peripheral, Antecubital, Left Updated: 04/17/22 0612     Blood Culture, Routine No growth after 5 days.              Imaging  ECG Results              EKG 12-lead (Final result)  Result time 04/12/22 12:51:09      Final  result by Interface, Lab In [facility] (04/12/22 12:51:09)                   Narrative:    Test Reason : R73.9,    Vent. Rate : 118 BPM     Atrial Rate : 111 BPM     P-R Int : 000 ms          QRS Dur : 162 ms      QT Int : 436 ms       P-R-T Axes : 000 -14 052 degrees     QTc Int : 611 ms    Wide QRS rhythm  Right bundle branch block  ST elevation anterior leads differential includes anterior STEMI vs,  repolarization abnormality  Abnormal ECG  When compared with ECG of 05-APR-2022 11:41,  Wide QRS rhythm has replaced Sinus rhythm  Vent. rate has increased BY  48 BPM  Confirmed by Megha Stock MD ([id number]) on 4/12/2022 12:51:00 PM    Referred By: [email or web address]   SELF           Confirmed By:Megha Stock MD                                    Results for orders placed during the hospital encounter of 01/25/22    Echo    Interpretation Summary  · There is abnormal septal wall motion.  · The left ventricle is normal in size with low normal systolic function.  · The estimated ejection fraction is 50%.  · Normal left ventricular diastolic function.  · Mild left atrial enlargement.  · Normal right ventricular size with normal right ventricular systolic function.  · Mild mitral regurgitation.  · There are segmental left ventricular wall motion abnormalities.  · Mild tricuspid regurgitation.  · Elevated central venous pressure (15 mmHg).      CT Chest Without Contrast  Narrative: EXAMINATION:  CT CHEST WITHOUT CONTRAST    CLINICAL HISTORY:  pulmonary nodule, follow up;    TECHNIQUE:  Low dose axial images, sagittal and coronal reformations were obtained from the thoracic inlet to the lung bases. Contrast was not administered.    COMPARISON:  Chest x-ray 04/11/2022, 03/05/2022; CT chest 12/01/2021, 09/23/2021, 04/14/2021.    FINDINGS:  SOFT TISSUES:  Unremarkable.    HEART AND MEDIASTINUM:  Several slightly prominent mediastinal lymph nodes, including 1.6 cm level 4R right lower paratracheal node (axial series 2, image 51),  grossly stable compared to prior.  Multi-vessel coronary arterial calcific plaques ranging from mild to moderate-severe.  Scattered calcific plaques in the visualized aorta.    PLEURA:  Unremarkable.    LUNGS AND AIRWAYS:  Airways are patent.  Advanced bilateral centrilobular and paraseptal emphysema with subpleural reticulations and bandlike opacities in the bilateral upper lobes with interval improvement in interlobular septal thickening.    There are new ground-glass opacities in the dependent portions of the right upper lobe and the right lower lobe, consider aspiration.    There is a new 7 mm right lower lobe posterior segment nodule (4-331)    There are several stable to mildly improved, bilateral solid pulmonary nodules with index lesions as follows:    *Evolving appearance of a right upper lobe posterior segment cavitary lesion with internal dependent mass; the cavity demonstrates a thinner wall and has decreased in diameter.  There is adjacent contracture of parenchyma.  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  The central debris is decreased in size,, now measuring 1.0 cm (axial series 4, image 81), previously 2.0 cm.  *Triangular 1.4 cm nodule in the posterior segment of the right upper lobe (axial series 4, image 181) previously 1.8 cm  *Stable appearance of previously described 1.0 cm solid nodule in the posterior segment of the right upper lobe (4-277, prior exam 6-290).  *Stable 12 mm right upper lobe triangular opacity (4-203, prior 6-183).  *Stable 0.9 cm solid nodule in the superior segment of the right lower lobe (axial series 4, image 37), previously 0.9 cm.  ESOPHAGUS:  Unremarkable.    UPPER ABDOMEN (limited):  Left renal cortical hypodensities, likely simple cysts.  Otherwise unremarkable.    BONES: Degenerative changes of the thoracic spine.  No fractures or focal osseous lesions.  Impression: 1. There are several stable pulmonary nodules as described above.  2.  There are new ground-glass opacities in the dependent portions of the right lung, consider aspiration.  3. There is a new 7 mm right lower lobe posterior segment nodule.  This may reflect infectious/inflammatory etiology given other findings in the chest.  Clinical considerations will determine if further investigation of this finding is to be pursued.    Electronically signed by resident: Ernesto Lui  Date:    04/19/2022  Time:    08:25    Electronically signed by: Katt Rubi  Date:    04/19/2022  Time:    10:02      All imaging within the last 24 hours was reviewed.       Discharge Planning   ALLIE: 4/25/2022     Code Status: DNR   Is the patient medically ready for discharge?: No    Reason for patient still in hospital (select all that apply): Patient trending condition, Treatment, Consult recommendations, and Pending disposition  Discharge Plan A: Skilled Nursing Facility   Discharge Delays: None known at this time

## 2022-04-22 NOTE — PROGRESS NOTES
[facility] - Telemetry Stepdown  Endocrinology  Progress Note    Admit Date: 4/11/2022     76 year old  male with T2DM, HTN, CAD, STEMI, HLD, paroxysmal Afib, CVA, seizures who presents for recurrent DKA.  He presented to [facility] ED on 4/11 with elevated blood glucose.  He was discharged from [facility] on 4/10 after being admitted for DKA on 4/5. Per family he was not feeling well after discharge to home and vomited several times on 4/11. Finger stick at home read High with unreadable blood glucose.  At this time daughter gave him 14 units of insulin and sublingual zofran. Other than malaise and nausea patient was not exhibiting any other signs/symptoms at home.    - In ER BG found to be 1015 with pCO2 6 and anion gap 35.  Hyperkalemic with K 7.0 and some EKG changes when compared to previous EKG.  Given calcium gluconate, and started on insulin gtt as well as subQ long acting insulin     - Endocrinology consulted for management of type 2 diabetes after resolution of DKA    - Spoke with daughter who states patient was discharged on 04/10/2022 with high glucose in the 400s which worsened after getting home and eating dinner; appropriate mealtime and correction insulin were given at that time. However, the next day patient's condition was worsening and they called EMS and his sugar was found to be in the thousands. She expresses concern that he cannot be care for adequately at home any longer and wishes to pursue skilled nursing facility.    Regarding Diabetes Mellitus:    Recurring episodes of DKA  Surgical Procedure and Date: NA  Diabetes diagnosis year: >20 years  Home Diabetes Medications:  During recent SNF was on Aspart 8 TID and glargine 8 units daily with sliding scale  How often checking glucose at home? >4 x day   Diabetes Complications include: Hyperglycemia, Hypoglycemia , and Diabetic peripheral neuropathy   Complicating diabetes co morbidities: History of CVA      Interval HPI:   Blood glucose on the lower  "side of normal this morning    /82 (BP Location: Right arm, Patient Position: Lying)   Pulse 63   Temp 97.9 °F (36.6 °C) (Oral)   Resp 17   Ht 5' 11" (1.803 m)   Wt 81.6 kg (179 lb 14.3 oz)   SpO2 (!) 93%   BMI 25.09 kg/m²     Labs Reviewed and Include    Recent Labs   Lab 04/22/22  0238   GLU 86   CALCIUM 8.3*   ALBUMIN 2.4*   PROT 5.7*      K 3.8   CO2 25      BUN 14   CREATININE 0.8   ALKPHOS 109   ALT 35   AST 62*   BILITOT 0.4     Lab Results   Component Value Date    WBC 6.04 04/22/2022    HGB 11.7 (L) 04/22/2022    HCT 34.8 (L) 04/22/2022    MCV 89 04/22/2022     04/22/2022     No results for input(s): TSH, FREET4 in the last 168 hours.  Lab Results   Component Value Date    HGBA1C 9.7 (H) 04/05/2022       Nutritional status:   Body mass index is 25.09 kg/m².  Lab Results   Component Value Date    ALBUMIN 2.4 (L) 04/22/2022    ALBUMIN 2.4 (L) 04/21/2022    ALBUMIN 2.4 (L) 04/20/2022     No results found for: PREALBUMIN    Estimated Creatinine Clearance: 81.1 mL/min (based on SCr of 0.8 mg/dL).    Accu-Checks  Recent Labs     04/20/22  1610 04/20/22  1826 04/20/22  2125 04/21/22  0826 04/21/22  1341 04/21/22  1658 04/21/22  1702 04/21/22  2054 04/22/22  0006 04/22/22  0403   POCTGLUCOSE 291* 237* 192* 155* 300* 283* 279* 184* 148* 75       Current Medications and/or Treatments Impacting Glycemic Control  Immunotherapy:    Immunosuppressants       None          Steroids:   Hormones (From admission, onward)                Start     Stop Route Frequency Ordered    04/14/22 1047  melatonin tablet 6 mg  (Medication Panel)         -- Oral Nightly PRN 04/14/22 0948          Pressors:    Autonomic Drugs (From admission, onward)                None          Hyperglycemia/Diabetes Medications:   Antihyperglycemics (From admission, onward)                Start     Stop Route Frequency Ordered    04/22/22 2100  insulin detemir U-100 pen 6 Units         -- SubQ 2 times daily 04/22/22 1017    " 22 1645  insulin aspart U-100 pen 8-12 Units         -- SubQ 3 times daily with meals 22 1425    22 0111  insulin aspart U-100 pen 4 Units         -- SubQ With snacks 22 0012    22 0819  insulin aspart U-100 pen 1-10 Units         -- SubQ Every 6 hours PRN 22 0720            ASSESSMENT and PLAN    Ketosis-prone diabetes mellitus  Key History and Diagnostic Findings  - A1c of 9.7 on 2022 from 11.5 on 2022  - Home Regimen: Levemir 8 units daily, aspart 8 units TIDWM  - Weight based dosin kg x 0.5 = 41 TDD x 0.5 = 20 basal / 20 prandial  - 1700/TDD = 41 (estimated insulin sensitivity factor)  - 450/TDD = 11 (estimated starting carb ratio for prandial dosing)  - Glucose Goals: 160-200mg/dL  - 24 hour glucose trend: blood sugars tight in the morning    Plan  - decrease levemir to 6 units twice daily  - changed yesterday aspart to 8-12 units TIDWM with moderate correction scale depending on his meal intake  - Hypoglycemia protocol in place  - If blood glucose greater than 300, please ask patient not to eat food or drink anything other than water until correctional insulin has brought it back below 250  - Please ensure patient receives their p.r.n. insulin aspart if they eat a snack with more than 30 g of carbohydrate  - Daughter wishes to pursue skilled nursing facility for discharge as she states he cannot be adequately taken care of at home    Coronary artery disease  - Strive to optimize glucose control        Modesto Abraham MD  Endocrinology  [facility]

## 2022-04-22 NOTE — NURSING
Pt in bed with eyes open, AOx4, able to voice needs, no distress noted, C/O nausea, treated per MAR, denies pain, bed locked in lowest position, call bell in reach, instructed to call when assistance needed.

## 2022-04-22 NOTE — CONSULTS
Thank you for your consult to [facility]. We have reviewed the patient chart. This patient does meet criteria for [facility] service at this time. Will assume care on 04/22/22 at 7AM.    Komal Huynh MD

## 2022-04-22 NOTE — PLAN OF CARE
Problem: Pain Acute  Goal: Acceptable Pain Control and Functional Ability  Outcome: Ongoing, Progressing    Pt AOX4, able to voice needs, no distress noted, denies pain, BGL noted, treated per MAR, educated on POC, verbalized understanding, educated on call bell, bed locked in lowest position, call bell in reach, instructed to call when assistance needed.       Purse String (Simple) Text: Given the location of the defect and the characteristics of the surrounding skin a purse string closure was deemed most appropriate.  Undermining was performed circumfirentially around the surgical defect.  A purse string suture was then placed and tightened.

## 2022-04-22 NOTE — ASSESSMENT & PLAN NOTE
"Mr. Kamar Muñoz is a 76 y.o. male with a past medical history of HTN, Type 2 DM, CAD, STEMI in January 2022 with LAUREN on DAPT, HLD, paroxysmal Afib on Eliquis, CVA, seizures and recurrent DKA. CT Chest without contrast obtained on 4/19 revealed "an evolving appearance of a right upper lobe posterior segment cavitary lesion with internal dependent mass; the cavity demonstrates a thinner wall and has decreased in diameter.  There is adjacent contracture of parenchyma.  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  The central debris is decreased in size,, now measuring 1.0 cm (axial series 4, image 81), previously 2.0 cm, and various other nodules". Of note, pt is a past smoker with at least 60 pack years. Quit about 30 years ago. He used to be a  for the city of [place]. Exerts weight loss, at least 24 lbs since January. Has been in DKA at least 3 times in the past 6 months. Denies night sweat, fever, chills. Denies homelessness. Has been in senior living for a day or two but no prolong halfway time. Denies significant travel history. Currently on RA. Pulmonology consulted for cavitary nodules.     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   1/11/2022 Culture with MAC pending susceptibilities     Differential includes but not limited to chronic MAC infection vs malignancy vs aspiration     Recommendations:  - Augmentin x 7 days   - Induced sputum and send for cultures, and AFB   - Patient can follow up with [facility] or [facility] NTM clinic re: MAC  - Overall, pt is likely a poor candidate for surgical intervention given he is on triple therapy (eliquis + DAPT) for both afib and recent STEMI with LAUREN stent placed in January    - were aspergillus serologies to be positive, ID would favour treatment due to the patient being a poor surgical candidate  - aspergillus serologies negative   - given the above, would favour earlier re-imaging than guideline directed 6-12 months for aspergillus   - " recommend CT chest without contrast 3 months from last CT chest (plan for July 2022)   - will need close follow up on these findings to evaluate mass resolution vs evolution  - No indication for bronchoscopy at this time  - Can continue follow up with Dr. Louie outpatient.

## 2022-04-22 NOTE — PLAN OF CARE
Patient, easily awaken by verbal stimuli. Able to make needs known to staff, verbalized abdominal discomfort r/t nausea, PRN Zofran admin per MD orders, effective. No apparent noted at this time, continuing to follow tx plan.   Problem: Pain Acute  Goal: Acceptable Pain Control and Functional Ability  Outcome: Ongoing, Progressing

## 2022-04-22 NOTE — ASSESSMENT & PLAN NOTE
Nutrition consulted. Most recent weight and BMI monitored- Body mass index is 25.09 kg/m².       Malnutrition (Moderate to Severe)  Weight Loss (Malnutrition): greater than 7.5% in 3 months  Energy Intake (Malnutrition): less than 75% for greater than or equal to 1 month              Measurements:  Wt Readings from Last 1 Encounters:   04/19/22 81.6 kg (179 lb 14.3 oz)   Body mass index is 25.09 kg/m².    Recommendations: Recommendation/Intervention: 1. Continue current Diabetic diet + ONS. 2. RD to monitor & follow-up.  Goals: Meet % EEN, EPN by RD f/u date    Patient has been screened and assessed by RD. RD will follow patient.

## 2022-04-22 NOTE — ASSESSMENT & PLAN NOTE
PT/OT recommends skilled nursing facility ongoing therapy; patient acknowledges and agrees with the need for ongoing therapy but unfortunately he is required to pay a co-pay for further SNF days.  He feels his only options to go home with limited sitter assistance and home health; his son has made arrangements for him to go to his preferred facility, [facility] but the patient feels he cannot manage the financial implications of going there; patient's son is committed to getting patient to [facility] which is likely his safest disposition

## 2022-04-22 NOTE — PROGRESS NOTES
[facility] - Telemetry Stepdown  Pulmonology  Progress Note    Patient Name: Kamar Muñoz  MRN: [id number]  Admission Date: 4/11/2022  Hospital Length of Stay: 11 days  Code Status: DNR  Attending Provider: Komal Huynh MD  Primary Care Provider: Basim Guerrero MD   Principal Problem: Pulmonary cavitary lesion    Subjective:     Interval History: on RA. Clinically unchanged. Aspergillus serologies negative.     Objective:     Vital Signs (Most Recent):  Temp: 97.9 °F (36.6 °C) (04/22/22 1138)  Pulse: 64 (04/22/22 1138)  Resp: 18 (04/22/22 1138)  BP: 138/64 (04/22/22 1138)  SpO2: (!) 93 % (04/22/22 1138)   Vital Signs (24h Range):  Temp:  [97.7 °F (36.5 °C)-98.3 °F (36.8 °C)] 97.9 °F (36.6 °C)  Pulse:  [52-64] 64  Resp:  [17-19] 18  SpO2:  [93 %-97 %] 93 %  BP: (117-148)/(62-82) 138/64     Weight: 81.6 kg (179 lb 14.3 oz)  Body mass index is 25.09 kg/m².      Intake/Output Summary (Last 24 hours) at 4/22/2022 1311  Last data filed at 4/22/2022 0944  Gross per 24 hour   Intake 660 ml   Output --   Net 660 ml       Physical Exam  Vitals and nursing note reviewed.   Constitutional:       General: He is not in acute distress.     Appearance: He is well-developed.   HENT:      Head: Normocephalic and atraumatic.   Eyes:      Conjunctiva/sclera: Conjunctivae normal.      Pupils: Pupils are equal, round, and reactive to light.   Cardiovascular:      Rate and Rhythm: Normal rate and regular rhythm.      Heart sounds: Normal heart sounds.   Pulmonary:      Effort: Pulmonary effort is normal.      Breath sounds: Normal breath sounds.   Abdominal:      General: Bowel sounds are normal.      Palpations: Abdomen is soft.      Tenderness: There is no abdominal tenderness.   Musculoskeletal:      Right lower leg: No edema.      Left lower leg: No edema.   Neurological:      Mental Status: He is alert and oriented to person, place, and time.   Psychiatric:         Behavior: Behavior normal.         Thought Content:  "Thought content normal.         Judgment: Judgment normal.       Vents:       Lines/Drains/Airways       Peripheral Intravenous Line  Duration                  Peripheral IV - Single Lumen 04/11/22 2338 20 G Right Antecubital 10 days                    Significant Labs:    CBC/Anemia Profile:  Recent Labs   Lab 04/21/22  0535 04/22/22  0238   WBC 4.82 6.04   HGB 11.8* 11.7*   HCT 36.0* 34.8*    230   MCV 89 89   RDW 16.0* 16.0*        Chemistries:  Recent Labs   Lab 04/21/22  0535 04/22/22  0238   * 137   K 3.8 3.8    103   CO2 25 25   BUN 16 14   CREATININE 0.8 0.8   CALCIUM 8.5* 8.3*   ALBUMIN 2.4* 2.4*   PROT 5.9* 5.7*   BILITOT 0.4 0.4   ALKPHOS 116 109   ALT 22 35   AST 37 62*   MG 1.5* 1.6   PHOS 3.1 2.9       All pertinent labs within the past 24 hours have been reviewed.    Significant Imaging:  I have reviewed all pertinent imaging results/findings within the past 24 hours.    Assessment/Plan:     * Pulmonary cavitary lesion  Mr. Kamar Muñoz is a 76 y.o. male with a past medical history of HTN, Type 2 DM, CAD, STEMI in January 2022 with LAUREN on DAPT, HLD, paroxysmal Afib on Eliquis, CVA, seizures and recurrent DKA. CT Chest without contrast obtained on 4/19 revealed "an evolving appearance of a right upper lobe posterior segment cavitary lesion with internal dependent mass; the cavity demonstrates a thinner wall and has decreased in diameter.  There is adjacent contracture of parenchyma.  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  The central debris is decreased in size,, now measuring 1.0 cm (axial series 4, image 81), previously 2.0 cm, and various other nodules". Of note, pt is a past smoker with at least 60 pack years. Quit about 30 years ago. He used to be a  for the city of [place]. Exerts weight loss, at least 24 lbs since January. Has been in DKA at least 3 times in the past 6 months. Denies night sweat, fever, chills. Denies " homelessness. Has been in [facility] for a day or two but no prolong snf time. Denies significant travel history. Currently on RA. Pulmonology consulted for cavitary nodules.     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   1/11/2022 Culture with MAC pending susceptibilities     Differential includes but not limited to chronic MAC infection vs malignancy vs aspiration     Recommendations:  - Augmentin x 7 days   - Induced sputum and send for cultures, and AFB   - Patient can follow up with [facility] or [facility] NTM clinic re: MAC  - Overall, pt is likely a poor candidate for surgical intervention given he is on triple therapy (eliquis + DAPT) for both afib and recent STEMI with LAUREN stent placed in January    - were aspergillus serologies to be positive, ID would favour treatment due to the patient being a poor surgical candidate  - aspergillus serologies negative   - given the above, would favour earlier re-imaging than guideline directed 6-12 months for aspergillus   - recommend CT chest without contrast 3 months from last CT chest (plan for July 2022)   - will need close follow up on these findings to evaluate mass resolution vs evolution  - No indication for bronchoscopy at this time  - Can continue follow up with Dr. Louie outpatient.         Pulmonology will sign off at this time. Patient is okay for discharge from a pulmonary perspective. Patient case and plan of care discussed with Dr. Rivera.        Radha Rowe MD  Pulmonology  [facility] - Telemetry Stepdown

## 2022-04-22 NOTE — SUBJECTIVE & OBJECTIVE
"Interval HPI:   Blood glucose on the lower side of normal this morning    /82 (BP Location: Right arm, Patient Position: Lying)   Pulse 63   Temp 97.9 °F (36.6 °C) (Oral)   Resp 17   Ht 5' 11" (1.803 m)   Wt 81.6 kg (179 lb 14.3 oz)   SpO2 (!) 93%   BMI 25.09 kg/m²     Labs Reviewed and Include    Recent Labs   Lab 04/22/22  0238   GLU 86   CALCIUM 8.3*   ALBUMIN 2.4*   PROT 5.7*      K 3.8   CO2 25      BUN 14   CREATININE 0.8   ALKPHOS 109   ALT 35   AST 62*   BILITOT 0.4     Lab Results   Component Value Date    WBC 6.04 04/22/2022    HGB 11.7 (L) 04/22/2022    HCT 34.8 (L) 04/22/2022    MCV 89 04/22/2022     04/22/2022     No results for input(s): TSH, FREET4 in the last 168 hours.  Lab Results   Component Value Date    HGBA1C 9.7 (H) 04/05/2022       Nutritional status:   Body mass index is 25.09 kg/m².  Lab Results   Component Value Date    ALBUMIN 2.4 (L) 04/22/2022    ALBUMIN 2.4 (L) 04/21/2022    ALBUMIN 2.4 (L) 04/20/2022     No results found for: PREALBUMIN    Estimated Creatinine Clearance: 81.1 mL/min (based on SCr of 0.8 mg/dL).    Accu-Checks  Recent Labs     04/20/22  1610 04/20/22  1826 04/20/22  2125 04/21/22  0826 04/21/22  1341 04/21/22  1658 04/21/22  1702 04/21/22  2054 04/22/22  0006 04/22/22  0403   POCTGLUCOSE 291* 237* 192* 155* 300* 283* 279* 184* 148* 75       Current Medications and/or Treatments Impacting Glycemic Control  Immunotherapy:    Immunosuppressants       None          Steroids:   Hormones (From admission, onward)                Start     Stop Route Frequency Ordered    04/14/22 1047  melatonin tablet 6 mg  (Medication Panel)         -- Oral Nightly PRN 04/14/22 0948          Pressors:    Autonomic Drugs (From admission, onward)                None          Hyperglycemia/Diabetes Medications:   Antihyperglycemics (From admission, onward)                Start     Stop Route Frequency Ordered    04/22/22 2100  insulin detemir U-100 pen 6 Units     "     -- SubQ 2 times daily 04/22/22 1017    04/21/22 1645  insulin aspart U-100 pen 8-12 Units         -- SubQ 3 times daily with meals 04/21/22 1425    04/17/22 0111  insulin aspart U-100 pen 4 Units         -- SubQ With snacks 04/17/22 0012    04/13/22 0819  insulin aspart U-100 pen 1-10 Units         -- SubQ Every 6 hours PRN 04/13/22 0720

## 2022-04-23 PROBLEM — E78.5 DYSLIPIDEMIA ASSOCIATED WITH TYPE 2 DIABETES MELLITUS: Status: ACTIVE | Noted: 2022-04-23

## 2022-04-23 PROBLEM — E11.69 DYSLIPIDEMIA ASSOCIATED WITH TYPE 2 DIABETES MELLITUS: Status: ACTIVE | Noted: 2022-04-23

## 2022-04-23 LAB
ALBUMIN SERPL BCP-MCNC: 2.5 G/DL (ref 3.5–5.2)
ALP SERPL-CCNC: 111 U/L (ref 55–135)
ALT SERPL W/O P-5'-P-CCNC: 38 U/L (ref 10–44)
ANION GAP SERPL CALC-SCNC: 10 MMOL/L (ref 8–16)
AST SERPL-CCNC: 56 U/L (ref 10–40)
BASOPHILS # BLD AUTO: 0.04 K/UL (ref 0–0.2)
BASOPHILS NFR BLD: 0.7 % (ref 0–1.9)
BILIRUB SERPL-MCNC: 0.4 MG/DL (ref 0.1–1)
BUN SERPL-MCNC: 15 MG/DL (ref 8–23)
CALCIUM SERPL-MCNC: 8.1 MG/DL (ref 8.7–10.5)
CHLORIDE SERPL-SCNC: 103 MMOL/L (ref 95–110)
CO2 SERPL-SCNC: 25 MMOL/L (ref 23–29)
CREAT SERPL-MCNC: 0.8 MG/DL (ref 0.5–1.4)
DIFFERENTIAL METHOD: ABNORMAL
EOSINOPHIL # BLD AUTO: 0.3 K/UL (ref 0–0.5)
EOSINOPHIL NFR BLD: 5.8 % (ref 0–8)
ERYTHROCYTE [DISTWIDTH] IN BLOOD BY AUTOMATED COUNT: 16 % (ref 11.5–14.5)
EST. GFR  (AFRICAN AMERICAN): >60 ML/MIN/1.73 M^2
EST. GFR  (NON AFRICAN AMERICAN): >60 ML/MIN/1.73 M^2
GLUCOSE SERPL-MCNC: 138 MG/DL (ref 70–110)
HCT VFR BLD AUTO: 35.1 % (ref 40–54)
HGB BLD-MCNC: 11.7 G/DL (ref 14–18)
IMM GRANULOCYTES # BLD AUTO: 0.01 K/UL (ref 0–0.04)
IMM GRANULOCYTES NFR BLD AUTO: 0.2 % (ref 0–0.5)
LYMPHOCYTES # BLD AUTO: 1.4 K/UL (ref 1–4.8)
LYMPHOCYTES NFR BLD: 26 % (ref 18–48)
MAGNESIUM SERPL-MCNC: 1.6 MG/DL (ref 1.6–2.6)
MCH RBC QN AUTO: 29.8 PG (ref 27–31)
MCHC RBC AUTO-ENTMCNC: 33.3 G/DL (ref 32–36)
MCV RBC AUTO: 90 FL (ref 82–98)
MONOCYTES # BLD AUTO: 0.5 K/UL (ref 0.3–1)
MONOCYTES NFR BLD: 9.6 % (ref 4–15)
NEUTROPHILS # BLD AUTO: 3.2 K/UL (ref 1.8–7.7)
NEUTROPHILS NFR BLD: 57.7 % (ref 38–73)
NRBC BLD-RTO: 0 /100 WBC
PHOSPHATE SERPL-MCNC: 3.2 MG/DL (ref 2.7–4.5)
PLATELET # BLD AUTO: 225 K/UL (ref 150–450)
PMV BLD AUTO: 9.9 FL (ref 9.2–12.9)
POCT GLUCOSE: 115 MG/DL (ref 70–110)
POCT GLUCOSE: 123 MG/DL (ref 70–110)
POCT GLUCOSE: 123 MG/DL (ref 70–110)
POCT GLUCOSE: 141 MG/DL (ref 70–110)
POCT GLUCOSE: 142 MG/DL (ref 70–110)
POTASSIUM SERPL-SCNC: 4.1 MMOL/L (ref 3.5–5.1)
PROT SERPL-MCNC: 5.6 G/DL (ref 6–8.4)
RBC # BLD AUTO: 3.92 M/UL (ref 4.6–6.2)
SODIUM SERPL-SCNC: 138 MMOL/L (ref 136–145)
WBC # BLD AUTO: 5.53 K/UL (ref 3.9–12.7)

## 2022-04-23 PROCEDURE — 36415 COLL VENOUS BLD VENIPUNCTURE: CPT | Performed by: INTERNAL MEDICINE

## 2022-04-23 PROCEDURE — 25000003 PHARM REV CODE 250: Performed by: INTERNAL MEDICINE

## 2022-04-23 PROCEDURE — 83735 ASSAY OF MAGNESIUM: CPT | Performed by: INTERNAL MEDICINE

## 2022-04-23 PROCEDURE — 99232 PR SUBSEQUENT HOSPITAL CARE,LEVL II: ICD-10-PCS | Mod: 95,,, | Performed by: INTERNAL MEDICINE

## 2022-04-23 PROCEDURE — 99232 SBSQ HOSP IP/OBS MODERATE 35: CPT | Mod: 95,,, | Performed by: INTERNAL MEDICINE

## 2022-04-23 PROCEDURE — 99232 PR SUBSEQUENT HOSPITAL CARE,LEVL II: ICD-10-PCS | Mod: GC,,, | Performed by: INTERNAL MEDICINE

## 2022-04-23 PROCEDURE — 80053 COMPREHEN METABOLIC PANEL: CPT | Performed by: INTERNAL MEDICINE

## 2022-04-23 PROCEDURE — 63600175 PHARM REV CODE 636 W HCPCS: Performed by: INTERNAL MEDICINE

## 2022-04-23 PROCEDURE — 84100 ASSAY OF PHOSPHORUS: CPT | Performed by: INTERNAL MEDICINE

## 2022-04-23 PROCEDURE — 99232 SBSQ HOSP IP/OBS MODERATE 35: CPT | Mod: GC,,, | Performed by: INTERNAL MEDICINE

## 2022-04-23 PROCEDURE — 20600001 HC STEP DOWN PRIVATE ROOM

## 2022-04-23 PROCEDURE — 85025 COMPLETE CBC W/AUTO DIFF WBC: CPT | Performed by: INTERNAL MEDICINE

## 2022-04-23 RX ORDER — INSULIN ASPART 100 [IU]/ML
8-14 INJECTION, SOLUTION INTRAVENOUS; SUBCUTANEOUS
Status: DISCONTINUED | OUTPATIENT
Start: 2022-04-23 | End: 2022-04-25

## 2022-04-23 RX ADMIN — LACOSAMIDE 100 MG: 100 TABLET, FILM COATED ORAL at 08:04

## 2022-04-23 RX ADMIN — INSULIN ASPART 14 UNITS: 100 INJECTION, SOLUTION INTRAVENOUS; SUBCUTANEOUS at 08:04

## 2022-04-23 RX ADMIN — TAMSULOSIN HYDROCHLORIDE 0.4 MG: 0.4 CAPSULE ORAL at 08:04

## 2022-04-23 RX ADMIN — ATORVASTATIN CALCIUM 40 MG: 20 TABLET, FILM COATED ORAL at 08:04

## 2022-04-23 RX ADMIN — Medication 6 MG: at 08:04

## 2022-04-23 RX ADMIN — INSULIN ASPART 4 UNITS: 100 INJECTION, SOLUTION INTRAVENOUS; SUBCUTANEOUS at 08:04

## 2022-04-23 RX ADMIN — CLOPIDOGREL 75 MG: 75 TABLET, FILM COATED ORAL at 08:04

## 2022-04-23 RX ADMIN — AMIODARONE HYDROCHLORIDE 200 MG: 200 TABLET ORAL at 08:04

## 2022-04-23 RX ADMIN — APIXABAN 5 MG: 5 TABLET, FILM COATED ORAL at 08:04

## 2022-04-23 RX ADMIN — INSULIN ASPART 8 UNITS: 100 INJECTION, SOLUTION INTRAVENOUS; SUBCUTANEOUS at 02:04

## 2022-04-23 RX ADMIN — ONDANSETRON 8 MG: 2 INJECTION INTRAMUSCULAR; INTRAVENOUS at 03:04

## 2022-04-23 RX ADMIN — AMOXICILLIN AND CLAVULANATE POTASSIUM 1 TABLET: 875; 125 TABLET, FILM COATED ORAL at 08:04

## 2022-04-23 RX ADMIN — PANTOPRAZOLE SODIUM 40 MG: 40 TABLET, DELAYED RELEASE ORAL at 08:04

## 2022-04-23 RX ADMIN — SENNOSIDES AND DOCUSATE SODIUM 1 TABLET: 50; 8.6 TABLET ORAL at 08:04

## 2022-04-23 RX ADMIN — POLYETHYLENE GLYCOL 3350 17 G: 17 POWDER, FOR SOLUTION ORAL at 08:04

## 2022-04-23 RX ADMIN — INSULIN ASPART 8 UNITS: 100 INJECTION, SOLUTION INTRAVENOUS; SUBCUTANEOUS at 05:04

## 2022-04-23 RX ADMIN — ONDANSETRON 8 MG: 2 INJECTION INTRAMUSCULAR; INTRAVENOUS at 06:04

## 2022-04-23 RX ADMIN — METOPROLOL SUCCINATE 50 MG: 50 TABLET, EXTENDED RELEASE ORAL at 08:04

## 2022-04-23 NOTE — PLAN OF CARE
Problem: Pain Acute  Goal: Acceptable Pain Control and Functional Ability  Outcome: Ongoing, Progressing     Pt in bed with eyes open, Aox4, able to voice needs, no distress noted, denies pain, VS and BGL noted and treated per MAR, ambulates well to bathroom with walker and standby assist, bed locked in lowest position, call bell in reach, instructed to call when assistance needed.

## 2022-04-23 NOTE — PROGRESS NOTES
[facility] - Tele[facility] Medicine  Telemedicine Progress Note    Patient Name: Kamar Muñoz  MRN: [id number]  Patient Class: IP- Inpatient   Admission Date: 4/11/2022  Length of Stay: 12 days  Attending Physician: Komal Huynh MD  Primary Care Provider: Basim Guerrero MD          Subjective:     Principal Problem:Pulmonary cavitary lesion        HPI:  Mr. Kamar Muñoz is a 76 y.o. male with a past medical history of HTN, Type 2 DM, CAD, STEMI, HLD, paroxysmal Afib, CVA, seizures and recurrent DKA.  [facility] on 4/11 with elevated blood glucose.  He was discharged [facility] on 4/10 after being admitted for DKA on 4/5.  At time of exam patient is alert but altered and unable to provide any history.  Spoke with patient's daughter who states she is a primary caregiver at home and obtained history as well as review of chart.  Per family he was not feeling well after discharge to home and vomited several times on 4/11. Unknown characteristics of emesis. Finger stick at home read High with unreadable blood glucose.  At this time daughter gave him 14 units of insulin and sublingual zofran. Other than malaise and nausea patient was not exhibiting any other signs/symptoms at home.  In ER BG found to be 1015 with pCO2 6 and anion gap 35.  Hyperkalemic with K 7.0 and some EKG changes when compared to previous EKG.  Given calcium gluconate, and started on insulin gtt as well as subQ long acting insulin.      Critical Care Medicine consulted for DKA and admitted to MICU.        Overview/Hospital Course:  Admitted to MICU on 4/11 for DKA with BG >1000, pCO2 6, AG 35, and hyperkalemia.  Given Calcium gluconate and started on insulin gtt in ED.  Hyperkalemia not improved on repeat labs and bolused with IV insulin.  Infectious workup sent and broad spectrum abx started.  4/12 potassium improving with insulin. 4/13 Gap closed, insulin gtt turned off and switched to subq insulin.  Endocrine consulted for insulin mgmt. He was stepped down to  4/14.    Since step down stable. Advanced diet. Endocrine following and titrating insulin. Poor po intake making it difficult to adjust insulin. CT chest with cavitary lesion, pulmonary and ID consulted. SNF once medically stable for dc.        This encounter was provided through telemedicine.  Patient was transferred to the telemedicine service on:  04/22/2022   The [facility] is: [facility] admitted 4/11/2022  8:55 PM.  Present with the patient at the time of the telemed/virtual assessment: Telepresenter    Interval History/Overnight Events:     Seated on side of bed but eating minimally - he is drinking boost and will attempt to order 2 for each meal; he is depressed about his wife's condition and says this is also making his appetite poor; he denies having shortness of breath or cough and has been unable to give a sputum sample; remains with intermittently elevated glucoses    Review of Systems   Constitutional:  Positive for activity change and fatigue.   Respiratory:  Negative for cough.    Cardiovascular:  Negative for leg swelling.   Gastrointestinal:  Negative for diarrhea and vomiting.   Musculoskeletal:  Positive for myalgias (buttock pain making it difficulty to sit).   Neurological:  Positive for weakness.   Psychiatric/Behavioral:  Negative for behavioral problems and confusion.       Inpatient Medications:  Scheduled Meds:   amiodarone  200 mg Oral Daily    amoxicillin-clavulanate 875-125mg  1 tablet Oral Q12H    apixaban  5 mg Oral BID    atorvastatin  40 mg Oral Daily    clopidogreL  75 mg Oral Daily    insulin aspart U-100  8-14 Units Subcutaneous TIDWM    insulin detemir U-100  6 Units Subcutaneous BID    lacosamide  100 mg Oral Q12H    metoprolol succinate  50 mg Oral Daily    pantoprazole  40 mg Oral Daily    polyethylene glycol  17 g Oral Daily    senna-docusate 8.6-50 mg  1 tablet Oral BID    tamsulosin  0.4 mg  Oral Daily     Continuous Infusions:  PRN Meds:.acetaminophen, calcium carbonate, cloNIDine, dextrose 10%, diphenhydrAMINE, glucagon (human recombinant), hydrALAZINE, insulin aspart U-100, insulin aspart U-100, melatonin, ondansetron, prochlorperazine, senna, simethicone, sodium chloride 0.9%      Objective:     Temp:  [97.7 °F (36.5 °C)-98.3 °F (36.8 °C)] 97.8 °F (36.6 °C)  Pulse:  [52-72] 52  Resp:  [14-18] 17  SpO2:  [93 %-96 %] 95 %  BP: (108-138)/(55-67) 110/62      Intake/Output Summary (Last 24 hours) at 4/23/2022 0919  Last data filed at 4/22/2022 1844  Gross per 24 hour   Intake 1120 ml   Output 25 ml   Net 1095 ml          Body mass index is 25.09 kg/m².    Physical Exam  Vitals and nursing note reviewed.   Constitutional:       General: He is not in acute distress.     Appearance: He is normal weight. He is ill-appearing.   HENT:      Head: Normocephalic and atraumatic.      Right Ear: Hearing normal.      Left Ear: Hearing normal.      Nose: Nose normal.   Eyes:      General: No scleral icterus.        Right eye: No discharge.         Left eye: No discharge.      Extraocular Movements: Extraocular movements intact.   Cardiovascular:      Rate and Rhythm: Normal rate.   Pulmonary:      Effort: Pulmonary effort is normal. No accessory muscle usage or respiratory distress.   Skin:     Findings: No rash.   Neurological:      General: No focal deficit present.      Mental Status: He is alert and oriented to person, place, and time.      Cranial Nerves: No cranial nerve deficit.      Motor: No weakness.   Psychiatric:         Attention and Perception: Attention normal.         Mood and Affect: Affect is flat.         Speech: Speech normal.         Behavior: Behavior is cooperative.        Labs:  Recent Results (from the past 24 hour(s))   POCT glucose    Collection Time: 04/22/22 11:39 AM   Result Value Ref Range    POCT Glucose 342 (H) 70 - 110 mg/dL   POCT glucose    Collection Time: 04/22/22  3:35 PM    Result Value Ref Range    POCT Glucose 375 (H) 70 - 110 mg/dL   POCT glucose    Collection Time: 04/22/22  9:06 PM   Result Value Ref Range    POCT Glucose 299 (H) 70 - 110 mg/dL   CBC Auto Differential    Collection Time: 04/23/22  3:03 AM   Result Value Ref Range    WBC 5.53 3.90 - 12.70 K/uL    RBC 3.92 (L) 4.60 - 6.20 M/uL    Hemoglobin 11.7 (L) 14.0 - 18.0 g/dL    Hematocrit 35.1 (L) 40.0 - 54.0 %    MCV 90 82 - 98 fL    MCH 29.8 27.0 - 31.0 pg    MCHC 33.3 32.0 - 36.0 g/dL    RDW 16.0 (H) 11.5 - 14.5 %    Platelets 225 150 - 450 K/uL    MPV 9.9 9.2 - 12.9 fL    Immature Granulocytes 0.2 0.0 - 0.5 %    Gran # (ANC) 3.2 1.8 - 7.7 K/uL    Immature Grans (Abs) 0.01 0.00 - 0.04 K/uL    Lymph # 1.4 1.0 - 4.8 K/uL    Mono # 0.5 0.3 - 1.0 K/uL    Eos # 0.3 0.0 - 0.5 K/uL    Baso # 0.04 0.00 - 0.20 K/uL    nRBC 0 0 /100 WBC    Gran % 57.7 38.0 - 73.0 %    Lymph % 26.0 18.0 - 48.0 %    Mono % 9.6 4.0 - 15.0 %    Eosinophil % 5.8 0.0 - 8.0 %    Basophil % 0.7 0.0 - 1.9 %    Differential Method Automated    Comprehensive Metabolic Panel    Collection Time: 04/23/22  3:03 AM   Result Value Ref Range    Sodium 138 136 - 145 mmol/L    Potassium 4.1 3.5 - 5.1 mmol/L    Chloride 103 95 - 110 mmol/L    CO2 25 23 - 29 mmol/L    Glucose 138 (H) 70 - 110 mg/dL    BUN 15 8 - 23 mg/dL    Creatinine 0.8 0.5 - 1.4 mg/dL    Calcium 8.1 (L) 8.7 - 10.5 mg/dL    Total Protein 5.6 (L) 6.0 - 8.4 g/dL    Albumin 2.5 (L) 3.5 - 5.2 g/dL    Total Bilirubin 0.4 0.1 - 1.0 mg/dL    Alkaline Phosphatase 111 55 - 135 U/L    AST 56 (H) 10 - 40 U/L    ALT 38 10 - 44 U/L    Anion Gap 10 8 - 16 mmol/L    eGFR if African American >60.0 >60 mL/min/1.73 m^2    eGFR if non African American >60.0 >60 mL/min/1.73 m^2   Magnesium    Collection Time: 04/23/22  3:03 AM   Result Value Ref Range    Magnesium 1.6 1.6 - 2.6 mg/dL   Phosphorus    Collection Time: 04/23/22  3:03 AM   Result Value Ref Range    Phosphorus 3.2 2.7 - 4.5 mg/dL   POCT glucose     Collection Time: 04/23/22  4:56 AM   Result Value Ref Range    POCT Glucose 123 (H) 70 - 110 mg/dL   POCT glucose    Collection Time: 04/23/22  7:27 AM   Result Value Ref Range    POCT Glucose 115 (H) 70 - 110 mg/dL        Lab Results   Component Value Date    BGW14CZAPINY Negative 04/05/2022       Recent Labs   Lab 04/21/22  0535 04/22/22  0238 04/23/22  0303   WBC 4.82 6.04 5.53   LYMPH 28.2  1.4 27.6  1.7 26.0  1.4   HGB 11.8* 11.7* 11.7*   HCT 36.0* 34.8* 35.1*    230 225       Recent Labs   Lab 04/21/22  0535 04/22/22  0238 04/23/22  0303   * 137 138   K 3.8 3.8 4.1    103 103   CO2 25 25 25   BUN 16 14 15   CREATININE 0.8 0.8 0.8   * 86 138*   CALCIUM 8.5* 8.3* 8.1*   MG 1.5* 1.6 1.6   PHOS 3.1 2.9 3.2       Recent Labs   Lab 04/21/22  0535 04/22/22  0238 04/23/22  0303   ALKPHOS 116 109 111   ALT 22 35 38   AST 37 62* 56*   ALBUMIN 2.4* 2.4* 2.5*   PROT 5.9* 5.7* 5.6*   BILITOT 0.4 0.4 0.4          No results for input(s): DDIMER, FERRITIN, CRP, LDH, BNP, TROPONINI, CPK in the last 72 hours.    Invalid input(s): PROCALCITONIN    All labs within the last 24 hours were reviewed.     Microbiology:  Microbiology Results (last 7 days)       Procedure Component Value Units Date/Time    Culture, Respiratory with Gram Stain [[id number]]     Order Status: No result Specimen: Respiratory     AFB Culture & Smear [[id number]]     Order Status: No result Specimen: Sputum     Blood culture [[id number]] Collected: 04/12/22 0008    Order Status: Completed Specimen: Blood from Peripheral, Hand, Right Updated: 04/17/22 0612     Blood Culture, Routine No growth after 5 days.    Blood culture [[id number]] Collected: 04/12/22 0008    Order Status: Completed Specimen: Blood from Peripheral, Antecubital, Left Updated: 04/17/22 0612     Blood Culture, Routine No growth after 5 days.              Imaging  ECG Results              EKG 12-lead (Final result)  Result time 04/12/22 12:51:09      Final result by  Interface, Lab In [facility] (04/12/22 12:51:09)                   Narrative:    Test Reason : R73.9,    Vent. Rate : 118 BPM     Atrial Rate : 111 BPM     P-R Int : 000 ms          QRS Dur : 162 ms      QT Int : 436 ms       P-R-T Axes : 000 -14 052 degrees     QTc Int : 611 ms    Wide QRS rhythm  Right bundle branch block  ST elevation anterior leads differential includes anterior STEMI vs,  repolarization abnormality  Abnormal ECG  When compared with ECG of 05-APR-2022 11:41,  Wide QRS rhythm has replaced Sinus rhythm  Vent. rate has increased BY  48 BPM  Confirmed by Megha Stock MD ([id number]) on 4/12/2022 12:51:00 PM    Referred By: [email or web address]   SELF           Confirmed By:Megha Stock MD                                    Results for orders placed during the hospital encounter of 01/25/22    Echo    Interpretation Summary  · There is abnormal septal wall motion.  · The left ventricle is normal in size with low normal systolic function.  · The estimated ejection fraction is 50%.  · Normal left ventricular diastolic function.  · Mild left atrial enlargement.  · Normal right ventricular size with normal right ventricular systolic function.  · Mild mitral regurgitation.  · There are segmental left ventricular wall motion abnormalities.  · Mild tricuspid regurgitation.  · Elevated central venous pressure (15 mmHg).      CT Chest Without Contrast  Narrative: EXAMINATION:  CT CHEST WITHOUT CONTRAST    CLINICAL HISTORY:  pulmonary nodule, follow up;    TECHNIQUE:  Low dose axial images, sagittal and coronal reformations were obtained from the thoracic inlet to the lung bases. Contrast was not administered.    COMPARISON:  Chest x-ray 04/11/2022, 03/05/2022; CT chest 12/01/2021, 09/23/2021, 04/14/2021.    FINDINGS:  SOFT TISSUES:  Unremarkable.    HEART AND MEDIASTINUM:  Several slightly prominent mediastinal lymph nodes, including 1.6 cm level 4R right lower paratracheal node (axial series 2, image 51), grossly stable  compared to prior.  Multi-vessel coronary arterial calcific plaques ranging from mild to moderate-severe.  Scattered calcific plaques in the visualized aorta.    PLEURA:  Unremarkable.    LUNGS AND AIRWAYS:  Airways are patent.  Advanced bilateral centrilobular and paraseptal emphysema with subpleural reticulations and bandlike opacities in the bilateral upper lobes with interval improvement in interlobular septal thickening.    There are new ground-glass opacities in the dependent portions of the right upper lobe and the right lower lobe, consider aspiration.    There is a new 7 mm right lower lobe posterior segment nodule (4-331)    There are several stable to mildly improved, bilateral solid pulmonary nodules with index lesions as follows:    *Evolving appearance of a right upper lobe posterior segment cavitary lesion with internal dependent mass; the cavity demonstrates a thinner wall and has decreased in diameter.  There is adjacent contracture of parenchyma.  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  The central debris is decreased in size,, now measuring 1.0 cm (axial series 4, image 81), previously 2.0 cm.  *Triangular 1.4 cm nodule in the posterior segment of the right upper lobe (axial series 4, image 181) previously 1.8 cm  *Stable appearance of previously described 1.0 cm solid nodule in the posterior segment of the right upper lobe (4-277, prior exam 6-290).  *Stable 12 mm right upper lobe triangular opacity (4-203, prior 6-183).  *Stable 0.9 cm solid nodule in the superior segment of the right lower lobe (axial series 4, image 37), previously 0.9 cm.  ESOPHAGUS:  Unremarkable.    UPPER ABDOMEN (limited):  Left renal cortical hypodensities, likely simple cysts.  Otherwise unremarkable.    BONES: Degenerative changes of the thoracic spine.  No fractures or focal osseous lesions.  Impression: 1. There are several stable pulmonary nodules as described above.  2. There are new  "ground-glass opacities in the dependent portions of the right lung, consider aspiration.  3. There is a new 7 mm right lower lobe posterior segment nodule.  This may reflect infectious/inflammatory etiology given other findings in the chest.  Clinical considerations will determine if further investigation of this finding is to be pursued.    Electronically signed by resident: Ernesto Lui  Date:    04/19/2022  Time:    08:25    Electronically signed by: Katt Rubi  Date:    04/19/2022  Time:    10:02      All imaging within the last 24 hours was reviewed.       Discharge Planning   ALLIE: 4/25/2022     Code Status: DNR   Is the patient medically ready for discharge?: No    Reason for patient still in hospital (select all that apply): Patient trending condition, Treatment, Consult recommendations, and Pending disposition  Discharge Plan A: Skilled Nursing Facility   Discharge Delays: None known at this time       Assessment/Plan:      * Pulmonary cavitary lesion  - As per CT, noted about a month ago  - Repeat CT Chest without contrast obtained on 4/19 revealed "an evolving appearance of a right upper lobe posterior segment cavitary lesion with internal dependent mass; the cavity demonstrates a thinner wall and has decreased in diameter.  There is adjacent contracture of parenchyma.  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  The central debris is decreased in size,, now measuring 1.0 cm (axial series 4, image 81), previously 2.0 cm, and various other nodules".   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  - 1/11/2022 Culture with MAC pending susceptibilities   - 1/11/2022 2nd AFB culture with pending results  - Differential includes MAC, aspergillosis, chronic aspiration and less likely malignancy   - Pulmonary rec Augmentin x 7 days   - Induced sputum and send for cultures ordered, and AFB - has not been able to supply sample  - ID consulted " and recommend repeat  - Poor candidate for surgical intervention given he is on triple therapy (eliquis + DAPT) for both afib and recent STEMI with LAUREN stent placed in January  - suspect his weight loss and recent decline worsened by underlying infection  - Repeat Chest CT in 3 months - 7/2022   - No indication for bronchoscopy at this time  - Can continue follow up with Dr. Louie outpatient.   - On RA and denies respiratory symptoms      Bacterial pneumonia  Complete 7 days of augmentin      Goals of care, counseling/discussion  - DNR as per ICU    Functional urinary incontinence  Remains requiring adult diapers  Post for residual ordered to assess for retention  Initiate time toileting for bladder training      Severe malnutrition  Nutrition consulted. Most recent weight and BMI monitored- Body mass index is 25.09 kg/m².       Malnutrition (Moderate to Severe)  Weight Loss (Malnutrition): greater than 7.5% in 3 months  Energy Intake (Malnutrition): less than 75% for greater than or equal to 1 month     -he is drinking more boost in eating food so will increased boost supplements to 2 servings with each tray.         Measurements:  Wt Readings from Last 1 Encounters:   04/19/22 81.6 kg (179 lb 14.3 oz)   Body mass index is 25.09 kg/m².    Recommendations: Recommendation/Intervention: 1. Continue current Diabetic diet + ONS. 2. RD to monitor & follow-up.  Goals: Meet % EEN, EPN by RD f/u date    Patient has been screened and assessed by RD. RD will follow patient.    Discharge planning issues  PT/OT recommends skilled nursing facility ongoing therapy; patient acknowledges and agrees with the need for ongoing therapy but unfortunately he is required to pay a co-pay for further SNF days.  He feels his only options to go home with limited sitter assistance and home health; his son has made arrangements for him to go to his preferred facility, [facility] but the patient feels he cannot manage the  financial implications of going there; patient's son is committed to getting patient to [facility] which is likely his safest disposition      Ketosis-prone diabetes mellitus  - Interval history and physical exam findings as described above  - DKA now resolved  - Working to optimize BG control  - Endocrine following and adjusting insulin regimen as needed  - SSI provided for corrective dosing  - DXTs as ordered  - Hypoglycemic protocol in effect  -Endocrine following due to erratic glucoses    Focal seizures  - History of seizures treated with lacosamide.    - Continue home lacosamide    Coronary artery disease  - Stable  - Continue home regimen of aspirin, atorvastatin, clopidogrel, losartan, and metoprolol      Paroxysmal atrial fibrillation  - History of afib.    - Continue home regimen of amiodarone, apixaban, and metoprolol        Essential hypertension  - resume home meds as tolerates        VTE Risk Mitigation (From admission, onward)         Ordered     apixaban tablet 5 mg  2 times daily         04/20/22 1209     IP VTE HIGH RISK PATIENT  Once         04/11/22 2342     Place sequential compression device  Until discontinued         04/11/22 2138                ,I have assessed these findings virtually using a telemed platform and with assistance of the bedside nurse.      The attending portion of this evaluation, treatment, and documentation was performed per Komal Huynh MD via Telemedicine AudioVisual using the secure [email or web address] software platform with 2 way audio/video. The provider was located off-site and the patient is located in the hospital. The aforementioned video software was utilized to document the relevant history and physical exam    Komal Huynh MD  Department of Hospital Medicine   [facility] - Telemetry Stepdown

## 2022-04-23 NOTE — SUBJECTIVE & OBJECTIVE
"Interval HPI:   Blood sugars above goal after patient missed yesterday morning's meal time insulin    /65 (Patient Position: Lying)   Pulse (!) 52   Temp 98.3 °F (36.8 °C) (Axillary)   Resp 14   Ht 5' 11" (1.803 m)   Wt 81.6 kg (179 lb 14.3 oz)   SpO2 (!) 94%   BMI 25.09 kg/m²     Labs Reviewed and Include    Recent Labs   Lab 04/22/22  0238   GLU 86   CALCIUM 8.3*   ALBUMIN 2.4*   PROT 5.7*      K 3.8   CO2 25      BUN 14   CREATININE 0.8   ALKPHOS 109   ALT 35   AST 62*   BILITOT 0.4     Lab Results   Component Value Date    WBC 6.04 04/22/2022    HGB 11.7 (L) 04/22/2022    HCT 34.8 (L) 04/22/2022    MCV 89 04/22/2022     04/22/2022     No results for input(s): TSH, FREET4 in the last 168 hours.  Lab Results   Component Value Date    HGBA1C 9.7 (H) 04/05/2022       Nutritional status:   Body mass index is 25.09 kg/m².  Lab Results   Component Value Date    ALBUMIN 2.4 (L) 04/22/2022    ALBUMIN 2.4 (L) 04/21/2022    ALBUMIN 2.4 (L) 04/20/2022     No results found for: PREALBUMIN    Estimated Creatinine Clearance: 81.1 mL/min (based on SCr of 0.8 mg/dL).    Accu-Checks  Recent Labs     04/21/22  1658 04/21/22  1702 04/21/22  2054 04/22/22  0006 04/22/22  0403 04/22/22  0719 04/22/22  0800 04/22/22  1139 04/22/22  1535 04/22/22  2106   POCTGLUCOSE 283* 279* 184* 148* 75 68* 119* 342* 375* 299*       Current Medications and/or Treatments Impacting Glycemic Control  Immunotherapy:    Immunosuppressants       None          Steroids:   Hormones (From admission, onward)                Start     Stop Route Frequency Ordered    04/14/22 1047  melatonin tablet 6 mg  (Medication Panel)         -- Oral Nightly PRN 04/14/22 0948          Pressors:    Autonomic Drugs (From admission, onward)                None          Hyperglycemia/Diabetes Medications:   Antihyperglycemics (From admission, onward)                Start     Stop Route Frequency Ordered    04/22/22 2100  insulin detemir U-100 pen " 6 Units         -- SubQ 2 times daily 04/22/22 1017    04/21/22 1645  insulin aspart U-100 pen 8-12 Units         -- SubQ 3 times daily with meals 04/21/22 1425    04/17/22 0111  insulin aspart U-100 pen 4 Units         -- SubQ With snacks 04/17/22 0012    04/13/22 0819  insulin aspart U-100 pen 1-10 Units         -- SubQ Every 6 hours PRN 04/13/22 0720

## 2022-04-23 NOTE — ASSESSMENT & PLAN NOTE
Nutrition consulted. Most recent weight and BMI monitored- Body mass index is 25.09 kg/m².       Malnutrition (Moderate to Severe)  Weight Loss (Malnutrition): greater than 7.5% in 3 months  Energy Intake (Malnutrition): less than 75% for greater than or equal to 1 month     -he is drinking more boost in eating food so will increased boost supplements to 2 servings with each tray.         Measurements:  Wt Readings from Last 1 Encounters:   04/19/22 81.6 kg (179 lb 14.3 oz)   Body mass index is 25.09 kg/m².    Recommendations: Recommendation/Intervention: 1. Continue current Diabetic diet + ONS. 2. RD to monitor & follow-up.  Goals: Meet % EEN, EPN by RD f/u date    Patient has been screened and assessed by RD. RD will follow patient.

## 2022-04-23 NOTE — SUBJECTIVE & OBJECTIVE
This encounter was provided through telemedicine.  Patient was transferred to the telemedicine service on:  04/22/2022   The patient location is: [id number] A admitted 4/11/2022  8:55 PM.  Present with the patient at the time of the telemed/virtual assessment: Telepresenter    Interval History/Overnight Events:     Seated on side of bed but eating minimally - he is drinking boost and will attempt to order 2 for each meal; he is depressed about his wife's condition and says this is also making his appetite poor; he denies having shortness of breath or cough and has been unable to give a sputum sample; remains with intermittently elevated glucoses    Review of Systems   Constitutional:  Positive for activity change and fatigue.   Respiratory:  Negative for cough.    Cardiovascular:  Negative for leg swelling.   Gastrointestinal:  Negative for diarrhea and vomiting.   Musculoskeletal:  Positive for myalgias (buttock pain making it difficulty to sit).   Neurological:  Positive for weakness.   Psychiatric/Behavioral:  Negative for behavioral problems and confusion.       Inpatient Medications:  Scheduled Meds:   amiodarone  200 mg Oral Daily    amoxicillin-clavulanate 875-125mg  1 tablet Oral Q12H    apixaban  5 mg Oral BID    atorvastatin  40 mg Oral Daily    clopidogreL  75 mg Oral Daily    insulin aspart U-100  8-14 Units Subcutaneous TIDWM    insulin detemir U-100  6 Units Subcutaneous BID    lacosamide  100 mg Oral Q12H    metoprolol succinate  50 mg Oral Daily    pantoprazole  40 mg Oral Daily    polyethylene glycol  17 g Oral Daily    senna-docusate 8.6-50 mg  1 tablet Oral BID    tamsulosin  0.4 mg Oral Daily     Continuous Infusions:  PRN Meds:.acetaminophen, calcium carbonate, cloNIDine, dextrose 10%, diphenhydrAMINE, glucagon (human recombinant), hydrALAZINE, insulin aspart U-100, insulin aspart U-100, melatonin, ondansetron, prochlorperazine, senna, simethicone, sodium chloride 0.9%      Objective:     Temp:   [97.7 °F (36.5 °C)-98.3 °F (36.8 °C)] 97.8 °F (36.6 °C)  Pulse:  [52-72] 52  Resp:  [14-18] 17  SpO2:  [93 %-96 %] 95 %  BP: (108-138)/(55-67) 110/62      Intake/Output Summary (Last 24 hours) at 4/23/2022 0919  Last data filed at 4/22/2022 1844  Gross per 24 hour   Intake 1120 ml   Output 25 ml   Net 1095 ml          Body mass index is 25.09 kg/m².    Physical Exam  Vitals and nursing note reviewed.   Constitutional:       General: He is not in acute distress.     Appearance: He is normal weight. He is ill-appearing.   HENT:      Head: Normocephalic and atraumatic.      Right Ear: Hearing normal.      Left Ear: Hearing normal.      Nose: Nose normal.   Eyes:      General: No scleral icterus.        Right eye: No discharge.         Left eye: No discharge.      Extraocular Movements: Extraocular movements intact.   Cardiovascular:      Rate and Rhythm: Normal rate.   Pulmonary:      Effort: Pulmonary effort is normal. No accessory muscle usage or respiratory distress.   Skin:     Findings: No rash.   Neurological:      General: No focal deficit present.      Mental Status: He is alert and oriented to person, place, and time.      Cranial Nerves: No cranial nerve deficit.      Motor: No weakness.   Psychiatric:         Attention and Perception: Attention normal.         Mood and Affect: Affect is flat.         Speech: Speech normal.         Behavior: Behavior is cooperative.        Labs:  Recent Results (from the past 24 hour(s))   POCT glucose    Collection Time: 04/22/22 11:39 AM   Result Value Ref Range    POCT Glucose 342 (H) 70 - 110 mg/dL   POCT glucose    Collection Time: 04/22/22  3:35 PM   Result Value Ref Range    POCT Glucose 375 (H) 70 - 110 mg/dL   POCT glucose    Collection Time: 04/22/22  9:06 PM   Result Value Ref Range    POCT Glucose 299 (H) 70 - 110 mg/dL   CBC Auto Differential    Collection Time: 04/23/22  3:03 AM   Result Value Ref Range    WBC 5.53 3.90 - 12.70 K/uL    RBC 3.92 (L) 4.60 - 6.20  M/uL    Hemoglobin 11.7 (L) 14.0 - 18.0 g/dL    Hematocrit 35.1 (L) 40.0 - 54.0 %    MCV 90 82 - 98 fL    MCH 29.8 27.0 - 31.0 pg    MCHC 33.3 32.0 - 36.0 g/dL    RDW 16.0 (H) 11.5 - 14.5 %    Platelets 225 150 - 450 K/uL    MPV 9.9 9.2 - 12.9 fL    Immature Granulocytes 0.2 0.0 - 0.5 %    Gran # (ANC) 3.2 1.8 - 7.7 K/uL    Immature Grans (Abs) 0.01 0.00 - 0.04 K/uL    Lymph # 1.4 1.0 - 4.8 K/uL    Mono # 0.5 0.3 - 1.0 K/uL    Eos # 0.3 0.0 - 0.5 K/uL    Baso # 0.04 0.00 - 0.20 K/uL    nRBC 0 0 /100 WBC    Gran % 57.7 38.0 - 73.0 %    Lymph % 26.0 18.0 - 48.0 %    Mono % 9.6 4.0 - 15.0 %    Eosinophil % 5.8 0.0 - 8.0 %    Basophil % 0.7 0.0 - 1.9 %    Differential Method Automated    Comprehensive Metabolic Panel    Collection Time: 04/23/22  3:03 AM   Result Value Ref Range    Sodium 138 136 - 145 mmol/L    Potassium 4.1 3.5 - 5.1 mmol/L    Chloride 103 95 - 110 mmol/L    CO2 25 23 - 29 mmol/L    Glucose 138 (H) 70 - 110 mg/dL    BUN 15 8 - 23 mg/dL    Creatinine 0.8 0.5 - 1.4 mg/dL    Calcium 8.1 (L) 8.7 - 10.5 mg/dL    Total Protein 5.6 (L) 6.0 - 8.4 g/dL    Albumin 2.5 (L) 3.5 - 5.2 g/dL    Total Bilirubin 0.4 0.1 - 1.0 mg/dL    Alkaline Phosphatase 111 55 - 135 U/L    AST 56 (H) 10 - 40 U/L    ALT 38 10 - 44 U/L    Anion Gap 10 8 - 16 mmol/L    eGFR if African American >60.0 >60 mL/min/1.73 m^2    eGFR if non African American >60.0 >60 mL/min/1.73 m^2   Magnesium    Collection Time: 04/23/22  3:03 AM   Result Value Ref Range    Magnesium 1.6 1.6 - 2.6 mg/dL   Phosphorus    Collection Time: 04/23/22  3:03 AM   Result Value Ref Range    Phosphorus 3.2 2.7 - 4.5 mg/dL   POCT glucose    Collection Time: 04/23/22  4:56 AM   Result Value Ref Range    POCT Glucose 123 (H) 70 - 110 mg/dL   POCT glucose    Collection Time: 04/23/22  7:27 AM   Result Value Ref Range    POCT Glucose 115 (H) 70 - 110 mg/dL        Lab Results   Component Value Date    [id number] Negative 04/05/2022       Recent Labs   Lab 04/21/22  0561  04/22/22  0238 04/23/22  0303   WBC 4.82 6.04 5.53   LYMPH 28.2  1.4 27.6  1.7 26.0  1.4   HGB 11.8* 11.7* 11.7*   HCT 36.0* 34.8* 35.1*    230 225       Recent Labs   Lab 04/21/22  0535 04/22/22  0238 04/23/22  0303   * 137 138   K 3.8 3.8 4.1    103 103   CO2 25 25 25   BUN 16 14 15   CREATININE 0.8 0.8 0.8   * 86 138*   CALCIUM 8.5* 8.3* 8.1*   MG 1.5* 1.6 1.6   PHOS 3.1 2.9 3.2       Recent Labs   Lab 04/21/22  0535 04/22/22  0238 04/23/22  0303   ALKPHOS 116 109 111   ALT 22 35 38   AST 37 62* 56*   ALBUMIN 2.4* 2.4* 2.5*   PROT 5.9* 5.7* 5.6*   BILITOT 0.4 0.4 0.4          No results for input(s): DDIMER, FERRITIN, CRP, LDH, BNP, TROPONINI, CPK in the last 72 hours.    Invalid input(s): PROCALCITONIN    All labs within the last 24 hours were reviewed.     Microbiology:  Microbiology Results (last 7 days)       Procedure Component Value Units Date/Time    Culture, Respiratory with Gram Stain [[id number]]     Order Status: No result Specimen: Respiratory     AFB Culture & Smear [[id number]]     Order Status: No result Specimen: Sputum     Blood culture [[id number]] Collected: 04/12/22 0008    Order Status: Completed Specimen: Blood from Peripheral, Hand, Right Updated: 04/17/22 0612     Blood Culture, Routine No growth after 5 days.    Blood culture [[id number]] Collected: 04/12/22 0008    Order Status: Completed Specimen: Blood from Peripheral, Antecubital, Left Updated: 04/17/22 0612     Blood Culture, Routine No growth after 5 days.              Imaging  ECG Results              EKG 12-lead (Final result)  Result time 04/12/22 12:51:09      Final result by Interface, Lab In [facility] (04/12/22 12:51:09)                   Narrative:    Test Reason : R73.9,    Vent. Rate : 118 BPM     Atrial Rate : 111 BPM     P-R Int : 000 ms          QRS Dur : 162 ms      QT Int : 436 ms       P-R-T Axes : 000 -14 052 degrees     QTc Int : 611 ms    Wide QRS rhythm  Right bundle branch block  ST  elevation anterior leads differential includes anterior STEMI vs,  repolarization abnormality  Abnormal ECG  When compared with ECG of 05-APR-2022 11:41,  Wide QRS rhythm has replaced Sinus rhythm  Vent. rate has increased BY  48 BPM  Confirmed by Megha Stock MD ([id number]) on 4/12/2022 12:51:00 PM    Referred By: GALLITOERR   SELF           Confirmed By:Megha Stock MD                                    Results for orders placed during the hospital encounter of 01/25/22    Echo    Interpretation Summary  · There is abnormal septal wall motion.  · The left ventricle is normal in size with low normal systolic function.  · The estimated ejection fraction is 50%.  · Normal left ventricular diastolic function.  · Mild left atrial enlargement.  · Normal right ventricular size with normal right ventricular systolic function.  · Mild mitral regurgitation.  · There are segmental left ventricular wall motion abnormalities.  · Mild tricuspid regurgitation.  · Elevated central venous pressure (15 mmHg).      CT Chest Without Contrast  Narrative: EXAMINATION:  CT CHEST WITHOUT CONTRAST    CLINICAL HISTORY:  pulmonary nodule, follow up;    TECHNIQUE:  Low dose axial images, sagittal and coronal reformations were obtained from the thoracic inlet to the lung bases. Contrast was not administered.    COMPARISON:  Chest x-ray 04/11/2022, 03/05/2022; CT chest 12/01/2021, 09/23/2021, 04/14/2021.    FINDINGS:  SOFT TISSUES:  Unremarkable.    HEART AND MEDIASTINUM:  Several slightly prominent mediastinal lymph nodes, including 1.6 cm level 4R right lower paratracheal node (axial series 2, image 51), grossly stable compared to prior.  Multi-vessel coronary arterial calcific plaques ranging from mild to moderate-severe.  Scattered calcific plaques in the visualized aorta.    PLEURA:  Unremarkable.    LUNGS AND AIRWAYS:  Airways are patent.  Advanced bilateral centrilobular and paraseptal emphysema with subpleural reticulations and  bandlike opacities in the bilateral upper lobes with interval improvement in interlobular septal thickening.    There are new ground-glass opacities in the dependent portions of the right upper lobe and the right lower lobe, consider aspiration.    There is a new 7 mm right lower lobe posterior segment nodule (4-331)    There are several stable to mildly improved, bilateral solid pulmonary nodules with index lesions as follows:    *Evolving appearance of a right upper lobe posterior segment cavitary lesion with internal dependent mass; the cavity demonstrates a thinner wall and has decreased in diameter.  There is adjacent contracture of parenchyma.  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  The central debris is decreased in size,, now measuring 1.0 cm (axial series 4, image 81), previously 2.0 cm.  *Triangular 1.4 cm nodule in the posterior segment of the right upper lobe (axial series 4, image 181) previously 1.8 cm  *Stable appearance of previously described 1.0 cm solid nodule in the posterior segment of the right upper lobe (4-277, prior exam 6-290).  *Stable 12 mm right upper lobe triangular opacity (4-203, prior 6-183).  *Stable 0.9 cm solid nodule in the superior segment of the right lower lobe (axial series 4, image 37), previously 0.9 cm.  ESOPHAGUS:  Unremarkable.    UPPER ABDOMEN (limited):  Left renal cortical hypodensities, likely simple cysts.  Otherwise unremarkable.    BONES: Degenerative changes of the thoracic spine.  No fractures or focal osseous lesions.  Impression: 1. There are several stable pulmonary nodules as described above.  2. There are new ground-glass opacities in the dependent portions of the right lung, consider aspiration.  3. There is a new 7 mm right lower lobe posterior segment nodule.  This may reflect infectious/inflammatory etiology given other findings in the chest.  Clinical considerations will determine if further investigation of this finding  is to be pursued.    Electronically signed by resident: Ernesto Lui  Date:    04/19/2022  Time:    08:25    Electronically signed by: Katt Rubi  Date:    04/19/2022  Time:    10:02      All imaging within the last 24 hours was reviewed.       Discharge Planning   ALLIE: 4/25/2022     Code Status: DNR   Is the patient medically ready for discharge?: No    Reason for patient still in hospital (select all that apply): Patient trending condition, Treatment, Consult recommendations, and Pending disposition  Discharge Plan A: Skilled Nursing Facility   Discharge Delays: None known at this time

## 2022-04-23 NOTE — PLAN OF CARE
Patient easily aroused by verbal stimuli, denies pain/ discomfort at this time. AROM to all extremities. Takes medications whole. No apparent distress noted at this time. Continuing treatment plan.   Problem: Pain Acute  Goal: Acceptable Pain Control and Functional Ability  Outcome: Ongoing, Progressing

## 2022-04-23 NOTE — PROGRESS NOTES
[facility] - Telemetry Stepdown  Endocrinology  Progress Note    Admit Date: 4/11/2022     76 year old  male with T2DM, HTN, CAD, STEMI, HLD, paroxysmal Afib, CVA, seizures who presents for recurrent DKA.  He presented to [facility] ED on 4/11 with elevated blood glucose.  He was discharged from [facility] on 4/10 after being admitted for DKA on 4/5. Per family he was not feeling well after discharge to home and vomited several times on 4/11. Finger stick at home read High with unreadable blood glucose.  At this time daughter gave him 14 units of insulin and sublingual zofran. Other than malaise and nausea patient was not exhibiting any other signs/symptoms at home.    - In ER BG found to be 1015 with pCO2 6 and anion gap 35.  Hyperkalemic with K 7.0 and some EKG changes when compared to previous EKG.  Given calcium gluconate, and started on insulin gtt as well as subQ long acting insulin     - Endocrinology consulted for management of type 2 diabetes after resolution of DKA    - Spoke with daughter who states patient was discharged on 04/10/2022 with high glucose in the 400s which worsened after getting home and eating dinner; appropriate mealtime and correction insulin were given at that time. However, the next day patient's condition was worsening and they called EMS and his sugar was found to be in the thousands. She expresses concern that he cannot be care for adequately at home any longer and wishes to pursue skilled nursing facility.    Regarding Diabetes Mellitus:    Recurring episodes of DKA  Surgical Procedure and Date: NA  Diabetes diagnosis year: >20 years  Home Diabetes Medications:  During recent SNF was on Aspart 8 TID and glargine 8 units daily with sliding scale  How often checking glucose at home? >4 x day   Diabetes Complications include: Hyperglycemia, Hypoglycemia , and Diabetic peripheral neuropathy   Complicating diabetes co morbidities: History of CVA      Interval HPI:   Blood sugars above goal  "after patient missed yesterday morning's meal time insulin    /65 (Patient Position: Lying)   Pulse (!) 52   Temp 98.3 °F (36.8 °C) (Axillary)   Resp 14   Ht 5' 11" (1.803 m)   Wt 81.6 kg (179 lb 14.3 oz)   SpO2 (!) 94%   BMI 25.09 kg/m²     Labs Reviewed and Include    Recent Labs   Lab 04/22/22  0238   GLU 86   CALCIUM 8.3*   ALBUMIN 2.4*   PROT 5.7*      K 3.8   CO2 25      BUN 14   CREATININE 0.8   ALKPHOS 109   ALT 35   AST 62*   BILITOT 0.4     Lab Results   Component Value Date    WBC 6.04 04/22/2022    HGB 11.7 (L) 04/22/2022    HCT 34.8 (L) 04/22/2022    MCV 89 04/22/2022     04/22/2022     No results for input(s): TSH, FREET4 in the last 168 hours.  Lab Results   Component Value Date    HGBA1C 9.7 (H) 04/05/2022       Nutritional status:   Body mass index is 25.09 kg/m².  Lab Results   Component Value Date    ALBUMIN 2.4 (L) 04/22/2022    ALBUMIN 2.4 (L) 04/21/2022    ALBUMIN 2.4 (L) 04/20/2022     No results found for: PREALBUMIN    Estimated Creatinine Clearance: 81.1 mL/min (based on SCr of 0.8 mg/dL).    Accu-Checks  Recent Labs     04/21/22  1658 04/21/22  1702 04/21/22  2054 04/22/22  0006 04/22/22  0403 04/22/22  0719 04/22/22  0800 04/22/22  1139 04/22/22  1535 04/22/22  2106   POCTGLUCOSE 283* 279* 184* 148* 75 68* 119* 342* 375* 299*       Current Medications and/or Treatments Impacting Glycemic Control  Immunotherapy:    Immunosuppressants       None          Steroids:   Hormones (From admission, onward)                Start     Stop Route Frequency Ordered    04/14/22 1047  melatonin tablet 6 mg  (Medication Panel)         -- Oral Nightly PRN 04/14/22 0948          Pressors:    Autonomic Drugs (From admission, onward)                None          Hyperglycemia/Diabetes Medications:   Antihyperglycemics (From admission, onward)                Start     Stop Route Frequency Ordered    04/22/22 2100  insulin detemir U-100 pen 6 Units         -- SubQ 2 times daily " 22 1017    22 1645  insulin aspart U-100 pen 8-12 Units         -- SubQ 3 times daily with meals 22 1425    22 0111  insulin aspart U-100 pen 4 Units         -- SubQ With snacks 22 0012    22 0819  insulin aspart U-100 pen 1-10 Units         -- SubQ Every 6 hours PRN 22 0720            ASSESSMENT and PLAN    Ketosis-prone diabetes mellitus  Key History and Diagnostic Findings  - A1c of 9.7 on 2022 from 11.5 on 2022  - Home Regimen: Levemir 8 units daily, aspart 8 units TIDWM  - Weight based dosin kg x 0.5 = 41 TDD x 0.5 = 20 basal / 20 prandial  - 1700/TDD = 41 (estimated insulin sensitivity factor)  - 450/TDD = 11 (estimated starting carb ratio for prandial dosing)  - Glucose Goals: 160-200mg/dL  - 24 hour glucose trend: blood sugars tight in the morning    Plan  -  levemir to 6 units twice daily  -  aspart to 8-14 units TIDWM with moderate correction scale depending on his meal intake  - Hypoglycemia protocol in place  - If blood glucose greater than 300, please ask patient not to eat food or drink anything other than water until correctional insulin has brought it back below 250  - Please ensure patient receives their p.r.n. insulin aspart if they eat a snack with more than 30 g of carbohydrate  - Daughter wishes to pursue skilled nursing facility for discharge as she states he cannot be adequately taken care of at home    Dyslipidemia associated with type 2 diabetes mellitus  - on atorvastatin 40 mg daily  - last lipid profile from 2022 shows LDL at 89, above goal of 70  - ensure compliance and repeat lipid profile outpatient in a month, if it continues to be above goal, would consider adding medication to therapy.      Coronary artery disease  - Strive to optimize glucose control        Modesto Abraham MD  Endocrinology  [facility]

## 2022-04-23 NOTE — ASSESSMENT & PLAN NOTE
- on atorvastatin 40 mg daily  - last lipid profile from January 25, 2022 shows LDL at 89, above goal of 70  - ensure compliance and repeat lipid profile outpatient in a month, if it continues to be above goal, would consider adding medication to therapy.

## 2022-04-23 NOTE — ASSESSMENT & PLAN NOTE
Key History and Diagnostic Findings  - A1c of 9.7 on 2022 from 11.5 on 2022  - Home Regimen: Levemir 8 units daily, aspart 8 units TIDWM  - Weight based dosin kg x 0.5 = 41 TDD x 0.5 = 20 basal / 20 prandial  - 1700/TDD = 41 (estimated insulin sensitivity factor)  - 450/TDD = 11 (estimated starting carb ratio for prandial dosing)  - Glucose Goals: 160-200mg/dL  - 24 hour glucose trend: blood sugars tight in the morning    Plan  -  levemir to 6 units twice daily  -  aspart to 8-14 units TIDWM with moderate correction scale depending on his meal intake  - Hypoglycemia protocol in place  - If blood glucose greater than 300, please ask patient not to eat food or drink anything other than water until correctional insulin has brought it back below 250  - Please ensure patient receives their p.r.n. insulin aspart if they eat a snack with more than 30 g of carbohydrate  - Daughter wishes to pursue skilled nursing facility for discharge as she states he cannot be adequately taken care of at home

## 2022-04-24 LAB
ALBUMIN SERPL BCP-MCNC: 2.3 G/DL (ref 3.5–5.2)
ALP SERPL-CCNC: 105 U/L (ref 55–135)
ALT SERPL W/O P-5'-P-CCNC: 32 U/L (ref 10–44)
AMORPH CRY UR QL COMP ASSIST: ABNORMAL
ANION GAP SERPL CALC-SCNC: 6 MMOL/L (ref 8–16)
ASPERGILLUS AB SER QL ID: NORMAL
AST SERPL-CCNC: 33 U/L (ref 10–40)
B DERMAT AB SER QL ID: NORMAL
BACTERIA #/AREA URNS AUTO: ABNORMAL /HPF
BASOPHILS # BLD AUTO: 0.05 K/UL (ref 0–0.2)
BASOPHILS NFR BLD: 0.7 % (ref 0–1.9)
BILIRUB SERPL-MCNC: 0.5 MG/DL (ref 0.1–1)
BILIRUB UR QL STRIP: NEGATIVE
BUN SERPL-MCNC: 16 MG/DL (ref 8–23)
C IMMITIS AB SER QL ID: NORMAL
CALCIUM SERPL-MCNC: 8.2 MG/DL (ref 8.7–10.5)
CAOX CRY UR QL COMP ASSIST: ABNORMAL
CHLORIDE SERPL-SCNC: 102 MMOL/L (ref 95–110)
CLARITY UR REFRACT.AUTO: ABNORMAL
CO2 SERPL-SCNC: 27 MMOL/L (ref 23–29)
COLOR UR AUTO: ABNORMAL
CREAT SERPL-MCNC: 0.9 MG/DL (ref 0.5–1.4)
DIFFERENTIAL METHOD: ABNORMAL
EOSINOPHIL # BLD AUTO: 0.4 K/UL (ref 0–0.5)
EOSINOPHIL NFR BLD: 5 % (ref 0–8)
ERYTHROCYTE [DISTWIDTH] IN BLOOD BY AUTOMATED COUNT: 16 % (ref 11.5–14.5)
EST. GFR  (AFRICAN AMERICAN): >60 ML/MIN/1.73 M^2
EST. GFR  (NON AFRICAN AMERICAN): >60 ML/MIN/1.73 M^2
GLUCOSE SERPL-MCNC: 259 MG/DL (ref 70–110)
GLUCOSE UR QL STRIP: ABNORMAL
H CAPSUL AB SER QL ID: NORMAL
HCT VFR BLD AUTO: 34.4 % (ref 40–54)
HGB BLD-MCNC: 11.4 G/DL (ref 14–18)
HGB UR QL STRIP: ABNORMAL
HYALINE CASTS UR QL AUTO: 23 /LPF
IMM GRANULOCYTES # BLD AUTO: 0.02 K/UL (ref 0–0.04)
IMM GRANULOCYTES NFR BLD AUTO: 0.3 % (ref 0–0.5)
KETONES UR QL STRIP: ABNORMAL
LEUKOCYTE ESTERASE UR QL STRIP: ABNORMAL
LYMPHOCYTES # BLD AUTO: 1.8 K/UL (ref 1–4.8)
LYMPHOCYTES NFR BLD: 25.1 % (ref 18–48)
MAGNESIUM SERPL-MCNC: 1.4 MG/DL (ref 1.6–2.6)
MCH RBC QN AUTO: 29.6 PG (ref 27–31)
MCHC RBC AUTO-ENTMCNC: 33.1 G/DL (ref 32–36)
MCV RBC AUTO: 89 FL (ref 82–98)
MICROSCOPIC COMMENT: ABNORMAL
MONOCYTES # BLD AUTO: 0.5 K/UL (ref 0.3–1)
MONOCYTES NFR BLD: 6.8 % (ref 4–15)
NEUTROPHILS # BLD AUTO: 4.5 K/UL (ref 1.8–7.7)
NEUTROPHILS NFR BLD: 62.1 % (ref 38–73)
NITRITE UR QL STRIP: NEGATIVE
NRBC BLD-RTO: 0 /100 WBC
PH UR STRIP: 5 [PH] (ref 5–8)
PHOSPHATE SERPL-MCNC: 3.1 MG/DL (ref 2.7–4.5)
PLATELET # BLD AUTO: 230 K/UL (ref 150–450)
PMV BLD AUTO: 9.5 FL (ref 9.2–12.9)
POCT GLUCOSE: 258 MG/DL (ref 70–110)
POCT GLUCOSE: 293 MG/DL (ref 70–110)
POCT GLUCOSE: 321 MG/DL (ref 70–110)
POCT GLUCOSE: 321 MG/DL (ref 70–110)
POCT GLUCOSE: 90 MG/DL (ref 70–110)
POTASSIUM SERPL-SCNC: 4.5 MMOL/L (ref 3.5–5.1)
PROT SERPL-MCNC: 5.7 G/DL (ref 6–8.4)
PROT UR QL STRIP: ABNORMAL
RBC # BLD AUTO: 3.85 M/UL (ref 4.6–6.2)
RBC #/AREA URNS AUTO: >100 /HPF (ref 0–4)
SODIUM SERPL-SCNC: 135 MMOL/L (ref 136–145)
SP GR UR STRIP: 1.02 (ref 1–1.03)
SQUAMOUS #/AREA URNS AUTO: 0 /HPF
URN SPEC COLLECT METH UR: ABNORMAL
WBC # BLD AUTO: 7.25 K/UL (ref 3.9–12.7)
WBC #/AREA URNS AUTO: 87 /HPF (ref 0–5)
YEAST UR QL AUTO: ABNORMAL

## 2022-04-24 PROCEDURE — 99232 PR SUBSEQUENT HOSPITAL CARE,LEVL II: ICD-10-PCS | Mod: 95,,, | Performed by: INTERNAL MEDICINE

## 2022-04-24 PROCEDURE — 87086 URINE CULTURE/COLONY COUNT: CPT | Performed by: INTERNAL MEDICINE

## 2022-04-24 PROCEDURE — 25000003 PHARM REV CODE 250: Performed by: INTERNAL MEDICINE

## 2022-04-24 PROCEDURE — 81001 URINALYSIS AUTO W/SCOPE: CPT | Performed by: INTERNAL MEDICINE

## 2022-04-24 PROCEDURE — 20600001 HC STEP DOWN PRIVATE ROOM

## 2022-04-24 PROCEDURE — 84100 ASSAY OF PHOSPHORUS: CPT | Performed by: INTERNAL MEDICINE

## 2022-04-24 PROCEDURE — 85025 COMPLETE CBC W/AUTO DIFF WBC: CPT | Performed by: INTERNAL MEDICINE

## 2022-04-24 PROCEDURE — 83735 ASSAY OF MAGNESIUM: CPT | Performed by: INTERNAL MEDICINE

## 2022-04-24 PROCEDURE — 99232 SBSQ HOSP IP/OBS MODERATE 35: CPT | Mod: 95,,, | Performed by: INTERNAL MEDICINE

## 2022-04-24 PROCEDURE — 51798 US URINE CAPACITY MEASURE: CPT

## 2022-04-24 PROCEDURE — 80053 COMPREHEN METABOLIC PANEL: CPT | Performed by: INTERNAL MEDICINE

## 2022-04-24 PROCEDURE — 36415 COLL VENOUS BLD VENIPUNCTURE: CPT | Performed by: INTERNAL MEDICINE

## 2022-04-24 RX ORDER — SUCRALFATE 1 G/1
1 TABLET ORAL
Status: DISCONTINUED | OUTPATIENT
Start: 2022-04-24 | End: 2022-04-26

## 2022-04-24 RX ADMIN — POLYETHYLENE GLYCOL 3350 17 G: 17 POWDER, FOR SOLUTION ORAL at 08:04

## 2022-04-24 RX ADMIN — AMOXICILLIN AND CLAVULANATE POTASSIUM 1 TABLET: 875; 125 TABLET, FILM COATED ORAL at 08:04

## 2022-04-24 RX ADMIN — DOCUSATE SODIUM 50 MG: 50 CAPSULE, LIQUID FILLED ORAL at 08:04

## 2022-04-24 RX ADMIN — INSULIN ASPART 8 UNITS: 100 INJECTION, SOLUTION INTRAVENOUS; SUBCUTANEOUS at 06:04

## 2022-04-24 RX ADMIN — PANTOPRAZOLE SODIUM 40 MG: 40 TABLET, DELAYED RELEASE ORAL at 08:04

## 2022-04-24 RX ADMIN — APIXABAN 5 MG: 5 TABLET, FILM COATED ORAL at 08:04

## 2022-04-24 RX ADMIN — LACOSAMIDE 100 MG: 100 TABLET, FILM COATED ORAL at 08:04

## 2022-04-24 RX ADMIN — INSULIN ASPART 14 UNITS: 100 INJECTION, SOLUTION INTRAVENOUS; SUBCUTANEOUS at 12:04

## 2022-04-24 RX ADMIN — INSULIN ASPART 8 UNITS: 100 INJECTION, SOLUTION INTRAVENOUS; SUBCUTANEOUS at 08:04

## 2022-04-24 RX ADMIN — INSULIN ASPART 8 UNITS: 100 INJECTION, SOLUTION INTRAVENOUS; SUBCUTANEOUS at 01:04

## 2022-04-24 RX ADMIN — SENNOSIDES AND DOCUSATE SODIUM 1 TABLET: 50; 8.6 TABLET ORAL at 08:04

## 2022-04-24 RX ADMIN — SUCRALFATE 1 G: 1 TABLET ORAL at 10:04

## 2022-04-24 RX ADMIN — METOPROLOL SUCCINATE 50 MG: 50 TABLET, EXTENDED RELEASE ORAL at 08:04

## 2022-04-24 RX ADMIN — INSULIN ASPART 4 UNITS: 100 INJECTION, SOLUTION INTRAVENOUS; SUBCUTANEOUS at 08:04

## 2022-04-24 RX ADMIN — INSULIN ASPART 6 UNITS: 100 INJECTION, SOLUTION INTRAVENOUS; SUBCUTANEOUS at 04:04

## 2022-04-24 RX ADMIN — SUCRALFATE 1 G: 1 TABLET ORAL at 04:04

## 2022-04-24 RX ADMIN — AMIODARONE HYDROCHLORIDE 200 MG: 200 TABLET ORAL at 08:04

## 2022-04-24 RX ADMIN — CLOPIDOGREL 75 MG: 75 TABLET, FILM COATED ORAL at 08:04

## 2022-04-24 RX ADMIN — ATORVASTATIN CALCIUM 40 MG: 20 TABLET, FILM COATED ORAL at 08:04

## 2022-04-24 RX ADMIN — DOCUSATE SODIUM 50 MG: 50 CAPSULE, LIQUID FILLED ORAL at 10:04

## 2022-04-24 RX ADMIN — Medication 6 MG: at 08:04

## 2022-04-24 RX ADMIN — INSULIN ASPART 14 UNITS: 100 INJECTION, SOLUTION INTRAVENOUS; SUBCUTANEOUS at 04:04

## 2022-04-24 RX ADMIN — TAMSULOSIN HYDROCHLORIDE 0.4 MG: 0.4 CAPSULE ORAL at 08:04

## 2022-04-24 NOTE — PLAN OF CARE
Problem: Pain Acute  Goal: Acceptable Pain Control and Functional Ability  Outcome: Ongoing, Progressing    Pt in bed with eyes open, looking at TV, Aox4, able to voice needs, no distress noted, denies pain, IV flushes and patent, VS WNL, BGL monitored and treated per MAR, educated on POC  and bladder training, verbalized understanding, reinforcement education needed, bed locked in lowest position, call bell in reach, instructed to call when assistance needed.

## 2022-04-24 NOTE — PLAN OF CARE
- Observed blood sugar trend, hyperglycemia this morning, will observe trend for now  - Target blood sugar goal 140 mg/dl   - Would recommend to continue levemir to 6 units twice daily and aspart  8-14 units TIDWM with moderate correction scale depending on his meal intake  - Hypoglycemia protocol in place  - If blood glucose greater than 300, please ask patient not to eat food or drink anything other than water until correctional insulin has brought it back below 250  - Please ensure patient receives their p.r.n. insulin aspart if they eat a snack with more than 30 g of carbohydrate

## 2022-04-24 NOTE — SUBJECTIVE & OBJECTIVE
This encounter was provided through telemedicine.  Patient was transferred to the telemedicine service on:  04/22/2022   The patient location is: [id number] A admitted 4/11/2022  8:55 PM.  Present with the patient at the time of the telemed/virtual assessment: Telepresenter    Interval History/Overnight Events:     C/o nausea, no vomiting - chronic issue that occurs when he eats acidic food and has associated burning- ate dirty rice last night; has noted frequent urination with incontinence which is unchanged from previous; does eat more carbs at home with expected increase in glucoses if he discharges to home    -he continues to say he is going home although his family states he has alternatives    Review of Systems   Constitutional:  Positive for activity change and fatigue.   Respiratory:  Negative for cough.    Cardiovascular:  Negative for leg swelling.   Gastrointestinal:  Negative for diarrhea and vomiting.   Endocrine: Positive for polyuria.   Genitourinary:  Negative for dysuria.   Musculoskeletal:  Positive for myalgias (buttock pain making it difficulty to sit).   Neurological:  Positive for weakness.   Psychiatric/Behavioral:  Negative for behavioral problems and confusion.       Inpatient Medications:  Scheduled Meds:   amiodarone  200 mg Oral Daily    amoxicillin-clavulanate 875-125mg  1 tablet Oral Q12H    apixaban  5 mg Oral BID    atorvastatin  40 mg Oral Daily    clopidogreL  75 mg Oral Daily    insulin aspart U-100  8-14 Units Subcutaneous TIDWM    insulin detemir U-100  6 Units Subcutaneous BID    lacosamide  100 mg Oral Q12H    metoprolol succinate  50 mg Oral Daily    pantoprazole  40 mg Oral Daily    polyethylene glycol  17 g Oral Daily    senna-docusate 8.6-50 mg  1 tablet Oral BID    tamsulosin  0.4 mg Oral Daily     Continuous Infusions:  PRN Meds:.acetaminophen, calcium carbonate, cloNIDine, dextrose 10%, diphenhydrAMINE, glucagon (human recombinant), hydrALAZINE, insulin aspart U-100,  insulin aspart U-100, melatonin, ondansetron, prochlorperazine, senna, simethicone, sodium chloride 0.9%      Objective:     Temp:  [96.6 °F (35.9 °C)-98.7 °F (37.1 °C)] 97.7 °F (36.5 °C)  Pulse:  [56-63] 63  Resp:  [16-20] 17  SpO2:  [89 %-96 %] 94 %  BP: ()/(53-67) 125/67      Intake/Output Summary (Last 24 hours) at 4/24/2022 0911  Last data filed at 4/23/2022 1715  Gross per 24 hour   Intake 480 ml   Output --   Net 480 ml          Body mass index is 25.09 kg/m².    Physical Exam  Vitals and nursing note reviewed.   Constitutional:       General: He is not in acute distress.     Appearance: He is normal weight. He is ill-appearing.   HENT:      Head: Normocephalic and atraumatic.      Right Ear: Hearing normal.      Left Ear: Hearing normal.      Nose: Nose normal.   Eyes:      General: No scleral icterus.        Right eye: No discharge.         Left eye: No discharge.      Extraocular Movements: Extraocular movements intact.   Cardiovascular:      Rate and Rhythm: Normal rate.   Pulmonary:      Effort: Pulmonary effort is normal. No accessory muscle usage or respiratory distress.   Skin:     Findings: No rash.   Neurological:      General: No focal deficit present.      Mental Status: He is alert and oriented to person, place, and time.      Cranial Nerves: No cranial nerve deficit.      Motor: No weakness.   Psychiatric:         Attention and Perception: Attention normal.         Mood and Affect: Affect is flat.         Speech: Speech normal.         Behavior: Behavior is cooperative.        Labs:  Recent Results (from the past 24 hour(s))   POCT glucose    Collection Time: 04/23/22 12:16 PM   Result Value Ref Range    POCT Glucose 123 (H) 70 - 110 mg/dL   POCT glucose    Collection Time: 04/23/22  3:45 PM   Result Value Ref Range    POCT Glucose 142 (H) 70 - 110 mg/dL   POCT glucose    Collection Time: 04/23/22  8:39 PM   Result Value Ref Range    POCT Glucose 141 (H) 70 - 110 mg/dL   CBC Auto  Differential    Collection Time: 04/24/22  4:17 AM   Result Value Ref Range    WBC 7.25 3.90 - 12.70 K/uL    RBC 3.85 (L) 4.60 - 6.20 M/uL    Hemoglobin 11.4 (L) 14.0 - 18.0 g/dL    Hematocrit 34.4 (L) 40.0 - 54.0 %    MCV 89 82 - 98 fL    MCH 29.6 27.0 - 31.0 pg    MCHC 33.1 32.0 - 36.0 g/dL    RDW 16.0 (H) 11.5 - 14.5 %    Platelets 230 150 - 450 K/uL    MPV 9.5 9.2 - 12.9 fL    Immature Granulocytes 0.3 0.0 - 0.5 %    Gran # (ANC) 4.5 1.8 - 7.7 K/uL    Immature Grans (Abs) 0.02 0.00 - 0.04 K/uL    Lymph # 1.8 1.0 - 4.8 K/uL    Mono # 0.5 0.3 - 1.0 K/uL    Eos # 0.4 0.0 - 0.5 K/uL    Baso # 0.05 0.00 - 0.20 K/uL    nRBC 0 0 /100 WBC    Gran % 62.1 38.0 - 73.0 %    Lymph % 25.1 18.0 - 48.0 %    Mono % 6.8 4.0 - 15.0 %    Eosinophil % 5.0 0.0 - 8.0 %    Basophil % 0.7 0.0 - 1.9 %    Differential Method Automated    Comprehensive Metabolic Panel    Collection Time: 04/24/22  4:17 AM   Result Value Ref Range    Sodium 135 (L) 136 - 145 mmol/L    Potassium 4.5 3.5 - 5.1 mmol/L    Chloride 102 95 - 110 mmol/L    CO2 27 23 - 29 mmol/L    Glucose 259 (H) 70 - 110 mg/dL    BUN 16 8 - 23 mg/dL    Creatinine 0.9 0.5 - 1.4 mg/dL    Calcium 8.2 (L) 8.7 - 10.5 mg/dL    Total Protein 5.7 (L) 6.0 - 8.4 g/dL    Albumin 2.3 (L) 3.5 - 5.2 g/dL    Total Bilirubin 0.5 0.1 - 1.0 mg/dL    Alkaline Phosphatase 105 55 - 135 U/L    AST 33 10 - 40 U/L    ALT 32 10 - 44 U/L    Anion Gap 6 (L) 8 - 16 mmol/L    eGFR if African American >60.0 >60 mL/min/1.73 m^2    eGFR if non African American >60.0 >60 mL/min/1.73 m^2   Magnesium    Collection Time: 04/24/22  4:17 AM   Result Value Ref Range    Magnesium 1.4 (L) 1.6 - 2.6 mg/dL   Phosphorus    Collection Time: 04/24/22  4:17 AM   Result Value Ref Range    Phosphorus 3.1 2.7 - 4.5 mg/dL   POCT glucose    Collection Time: 04/24/22  4:39 AM   Result Value Ref Range    POCT Glucose 258 (H) 70 - 110 mg/dL   POCT glucose    Collection Time: 04/24/22  7:38 AM   Result Value Ref Range    POCT Glucose  321 (H) 70 - 110 mg/dL        Lab Results   Component Value Date    [id number] Negative 04/05/2022       Recent Labs   Lab 04/22/22 0238 04/23/22 0303 04/24/22 0417   WBC 6.04 5.53 7.25   LYMPH 27.6  1.7 26.0  1.4 25.1  1.8   HGB 11.7* 11.7* 11.4*   HCT 34.8* 35.1* 34.4*    225 230       Recent Labs   Lab 04/22/22 0238 04/23/22 0303 04/24/22 0417    138 135*   K 3.8 4.1 4.5    103 102   CO2 25 25 27   BUN 14 15 16   CREATININE 0.8 0.8 0.9   GLU 86 138* 259*   CALCIUM 8.3* 8.1* 8.2*   MG 1.6 1.6 1.4*   PHOS 2.9 3.2 3.1       Recent Labs   Lab 04/22/22 0238 04/23/22 0303 04/24/22 0417   ALKPHOS 109 111 105   ALT 35 38 32   AST 62* 56* 33   ALBUMIN 2.4* 2.5* 2.3*   PROT 5.7* 5.6* 5.7*   BILITOT 0.4 0.4 0.5          No results for input(s): DDIMER, FERRITIN, CRP, LDH, BNP, TROPONINI, CPK in the last 72 hours.    Invalid input(s): PROCALCITONIN    All labs within the last 24 hours were reviewed.     Microbiology:  Microbiology Results (last 7 days)       Procedure Component Value Units Date/Time    Culture, Respiratory with Gram Stain [[id number]]     Order Status: No result Specimen: Respiratory     AFB Culture & Smear [[id number]]     Order Status: No result Specimen: Sputum               Imaging  ECG Results              EKG 12-lead (Final result)  Result time 04/12/22 12:51:09      Final result by Interface, Lab In [facility] (04/12/22 12:51:09)                   Narrative:    Test Reason : R73.9,    Vent. Rate : 118 BPM     Atrial Rate : 111 BPM     P-R Int : 000 ms          QRS Dur : 162 ms      QT Int : 436 ms       P-R-T Axes : 000 -14 052 degrees     QTc Int : 611 ms    Wide QRS rhythm  Right bundle branch block  ST elevation anterior leads differential includes anterior STEMI vs,  repolarization abnormality  Abnormal ECG  When compared with ECG of 05-APR-2022 11:41,  Wide QRS rhythm has replaced Sinus rhythm  Vent. rate has increased BY  48 BPM  Confirmed by Megha Stock MD ([id number])  on 4/12/2022 12:51:00 PM    Referred By: GAGAN   SELF           Confirmed By:Megha Stock MD                                    Results for orders placed during the hospital encounter of 01/25/22    Echo    Interpretation Summary  · There is abnormal septal wall motion.  · The left ventricle is normal in size with low normal systolic function.  · The estimated ejection fraction is 50%.  · Normal left ventricular diastolic function.  · Mild left atrial enlargement.  · Normal right ventricular size with normal right ventricular systolic function.  · Mild mitral regurgitation.  · There are segmental left ventricular wall motion abnormalities.  · Mild tricuspid regurgitation.  · Elevated central venous pressure (15 mmHg).      CT Chest Without Contrast  Narrative: EXAMINATION:  CT CHEST WITHOUT CONTRAST    CLINICAL HISTORY:  pulmonary nodule, follow up;    TECHNIQUE:  Low dose axial images, sagittal and coronal reformations were obtained from the thoracic inlet to the lung bases. Contrast was not administered.    COMPARISON:  Chest x-ray 04/11/2022, 03/05/2022; CT chest 12/01/2021, 09/23/2021, 04/14/2021.    FINDINGS:  SOFT TISSUES:  Unremarkable.    HEART AND MEDIASTINUM:  Several slightly prominent mediastinal lymph nodes, including 1.6 cm level 4R right lower paratracheal node (axial series 2, image 51), grossly stable compared to prior.  Multi-vessel coronary arterial calcific plaques ranging from mild to moderate-severe.  Scattered calcific plaques in the visualized aorta.    PLEURA:  Unremarkable.    LUNGS AND AIRWAYS:  Airways are patent.  Advanced bilateral centrilobular and paraseptal emphysema with subpleural reticulations and bandlike opacities in the bilateral upper lobes with interval improvement in interlobular septal thickening.    There are new ground-glass opacities in the dependent portions of the right upper lobe and the right lower lobe, consider aspiration.    There is a new 7 mm right lower  lobe posterior segment nodule (4-331)    There are several stable to mildly improved, bilateral solid pulmonary nodules with index lesions as follows:    *Evolving appearance of a right upper lobe posterior segment cavitary lesion with internal dependent mass; the cavity demonstrates a thinner wall and has decreased in diameter.  There is adjacent contracture of parenchyma.  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  The central debris is decreased in size,, now measuring 1.0 cm (axial series 4, image 81), previously 2.0 cm.  *Triangular 1.4 cm nodule in the posterior segment of the right upper lobe (axial series 4, image 181) previously 1.8 cm  *Stable appearance of previously described 1.0 cm solid nodule in the posterior segment of the right upper lobe (4-277, prior exam 6-290).  *Stable 12 mm right upper lobe triangular opacity (4-203, prior 6-183).  *Stable 0.9 cm solid nodule in the superior segment of the right lower lobe (axial series 4, image 37), previously 0.9 cm.  ESOPHAGUS:  Unremarkable.    UPPER ABDOMEN (limited):  Left renal cortical hypodensities, likely simple cysts.  Otherwise unremarkable.    BONES: Degenerative changes of the thoracic spine.  No fractures or focal osseous lesions.  Impression: 1. There are several stable pulmonary nodules as described above.  2. There are new ground-glass opacities in the dependent portions of the right lung, consider aspiration.  3. There is a new 7 mm right lower lobe posterior segment nodule.  This may reflect infectious/inflammatory etiology given other findings in the chest.  Clinical considerations will determine if further investigation of this finding is to be pursued.    Electronically signed by resident: Ernesto Lui  Date:    04/19/2022  Time:    08:25    Electronically signed by: Katt Rubi  Date:    04/19/2022  Time:    10:02      All imaging within the last 24 hours was reviewed.       Discharge Planning   ALLIE:  4/25/2022     Code Status: DNR   Is the patient medically ready for discharge?: No    Reason for patient still in hospital (select all that apply): Patient trending condition, Treatment, Consult recommendations, and Pending disposition  Discharge Plan A: Skilled Nursing Facility   Discharge Delays: None known at this time

## 2022-04-25 LAB
ACID FAST SUSCEPTIBILITY, SLOW GROWER: ABNORMAL
ALBUMIN SERPL BCP-MCNC: 2.4 G/DL (ref 3.5–5.2)
ALP SERPL-CCNC: 103 U/L (ref 55–135)
ALT SERPL W/O P-5'-P-CCNC: 36 U/L (ref 10–44)
ANION GAP SERPL CALC-SCNC: 9 MMOL/L (ref 8–16)
AST SERPL-CCNC: 52 U/L (ref 10–40)
BACTERIA UR CULT: NORMAL
BACTERIA UR CULT: NORMAL
BASOPHILS # BLD AUTO: 0.06 K/UL (ref 0–0.2)
BASOPHILS NFR BLD: 0.8 % (ref 0–1.9)
BILIRUB SERPL-MCNC: 0.5 MG/DL (ref 0.1–1)
BUN SERPL-MCNC: 21 MG/DL (ref 8–23)
CALCIUM SERPL-MCNC: 8.3 MG/DL (ref 8.7–10.5)
CHLORIDE SERPL-SCNC: 101 MMOL/L (ref 95–110)
CO2 SERPL-SCNC: 26 MMOL/L (ref 23–29)
CREAT SERPL-MCNC: 0.9 MG/DL (ref 0.5–1.4)
DIFFERENTIAL METHOD: ABNORMAL
EOSINOPHIL # BLD AUTO: 0.4 K/UL (ref 0–0.5)
EOSINOPHIL NFR BLD: 5.3 % (ref 0–8)
ERYTHROCYTE [DISTWIDTH] IN BLOOD BY AUTOMATED COUNT: 16 % (ref 11.5–14.5)
EST. GFR  (AFRICAN AMERICAN): >60 ML/MIN/1.73 M^2
EST. GFR  (NON AFRICAN AMERICAN): >60 ML/MIN/1.73 M^2
GLUCOSE SERPL-MCNC: 246 MG/DL (ref 70–110)
HCT VFR BLD AUTO: 34.4 % (ref 40–54)
HGB BLD-MCNC: 11.4 G/DL (ref 14–18)
IMM GRANULOCYTES # BLD AUTO: 0.02 K/UL (ref 0–0.04)
IMM GRANULOCYTES NFR BLD AUTO: 0.3 % (ref 0–0.5)
LYMPHOCYTES # BLD AUTO: 2.1 K/UL (ref 1–4.8)
LYMPHOCYTES NFR BLD: 28.4 % (ref 18–48)
MAGNESIUM SERPL-MCNC: 1.5 MG/DL (ref 1.6–2.6)
MCH RBC QN AUTO: 29.3 PG (ref 27–31)
MCHC RBC AUTO-ENTMCNC: 33.1 G/DL (ref 32–36)
MCV RBC AUTO: 88 FL (ref 82–98)
MONOCYTES # BLD AUTO: 0.5 K/UL (ref 0.3–1)
MONOCYTES NFR BLD: 7 % (ref 4–15)
NEUTROPHILS # BLD AUTO: 4.2 K/UL (ref 1.8–7.7)
NEUTROPHILS NFR BLD: 58.2 % (ref 38–73)
NRBC BLD-RTO: 0 /100 WBC
PHOSPHATE SERPL-MCNC: 2.9 MG/DL (ref 2.7–4.5)
PLATELET # BLD AUTO: 225 K/UL (ref 150–450)
PMV BLD AUTO: 9.4 FL (ref 9.2–12.9)
POCT GLUCOSE: 228 MG/DL (ref 70–110)
POCT GLUCOSE: 287 MG/DL (ref 70–110)
POCT GLUCOSE: 318 MG/DL (ref 70–110)
POCT GLUCOSE: 330 MG/DL (ref 70–110)
POCT GLUCOSE: 338 MG/DL (ref 70–110)
POCT GLUCOSE: 439 MG/DL (ref 70–110)
POTASSIUM SERPL-SCNC: 4.5 MMOL/L (ref 3.5–5.1)
PROT SERPL-MCNC: 5.5 G/DL (ref 6–8.4)
RBC # BLD AUTO: 3.89 M/UL (ref 4.6–6.2)
SODIUM SERPL-SCNC: 136 MMOL/L (ref 136–145)
WBC # BLD AUTO: 7.29 K/UL (ref 3.9–12.7)

## 2022-04-25 PROCEDURE — 84100 ASSAY OF PHOSPHORUS: CPT | Performed by: INTERNAL MEDICINE

## 2022-04-25 PROCEDURE — 99232 SBSQ HOSP IP/OBS MODERATE 35: CPT | Mod: GC,,, | Performed by: INTERNAL MEDICINE

## 2022-04-25 PROCEDURE — 25000003 PHARM REV CODE 250: Performed by: INTERNAL MEDICINE

## 2022-04-25 PROCEDURE — 63600175 PHARM REV CODE 636 W HCPCS: Performed by: INTERNAL MEDICINE

## 2022-04-25 PROCEDURE — 63600175 PHARM REV CODE 636 W HCPCS: Performed by: STUDENT IN AN ORGANIZED HEALTH CARE EDUCATION/TRAINING PROGRAM

## 2022-04-25 PROCEDURE — 83735 ASSAY OF MAGNESIUM: CPT | Performed by: INTERNAL MEDICINE

## 2022-04-25 PROCEDURE — 99232 PR SUBSEQUENT HOSPITAL CARE,LEVL II: ICD-10-PCS | Mod: GC,,, | Performed by: INTERNAL MEDICINE

## 2022-04-25 PROCEDURE — 99232 PR SUBSEQUENT HOSPITAL CARE,LEVL II: ICD-10-PCS | Mod: 95,,, | Performed by: INTERNAL MEDICINE

## 2022-04-25 PROCEDURE — 99232 SBSQ HOSP IP/OBS MODERATE 35: CPT | Mod: 95,,, | Performed by: INTERNAL MEDICINE

## 2022-04-25 PROCEDURE — 20600001 HC STEP DOWN PRIVATE ROOM

## 2022-04-25 PROCEDURE — 36415 COLL VENOUS BLD VENIPUNCTURE: CPT | Performed by: INTERNAL MEDICINE

## 2022-04-25 PROCEDURE — 85025 COMPLETE CBC W/AUTO DIFF WBC: CPT | Performed by: INTERNAL MEDICINE

## 2022-04-25 PROCEDURE — 80053 COMPREHEN METABOLIC PANEL: CPT | Performed by: INTERNAL MEDICINE

## 2022-04-25 RX ORDER — INSULIN ASPART 100 [IU]/ML
10-15 INJECTION, SOLUTION INTRAVENOUS; SUBCUTANEOUS
Status: DISCONTINUED | OUTPATIENT
Start: 2022-04-25 | End: 2022-04-25

## 2022-04-25 RX ORDER — INSULIN ASPART 100 [IU]/ML
10-14 INJECTION, SOLUTION INTRAVENOUS; SUBCUTANEOUS
Status: DISCONTINUED | OUTPATIENT
Start: 2022-04-25 | End: 2022-04-25

## 2022-04-25 RX ORDER — INSULIN ASPART 100 [IU]/ML
14 INJECTION, SOLUTION INTRAVENOUS; SUBCUTANEOUS
Status: DISCONTINUED | OUTPATIENT
Start: 2022-04-25 | End: 2022-04-26

## 2022-04-25 RX ORDER — INSULIN ASPART 100 [IU]/ML
1-10 INJECTION, SOLUTION INTRAVENOUS; SUBCUTANEOUS
Status: DISCONTINUED | OUTPATIENT
Start: 2022-04-25 | End: 2022-05-03 | Stop reason: HOSPADM

## 2022-04-25 RX ADMIN — INSULIN ASPART 6 UNITS: 100 INJECTION, SOLUTION INTRAVENOUS; SUBCUTANEOUS at 10:04

## 2022-04-25 RX ADMIN — PROCHLORPERAZINE EDISYLATE 5 MG: 5 INJECTION INTRAMUSCULAR; INTRAVENOUS at 09:04

## 2022-04-25 RX ADMIN — ATORVASTATIN CALCIUM 40 MG: 20 TABLET, FILM COATED ORAL at 08:04

## 2022-04-25 RX ADMIN — INSULIN ASPART 10 UNITS: 100 INJECTION, SOLUTION INTRAVENOUS; SUBCUTANEOUS at 12:04

## 2022-04-25 RX ADMIN — DIPHENHYDRAMINE HYDROCHLORIDE 25 MG: 25 CAPSULE ORAL at 09:04

## 2022-04-25 RX ADMIN — AMOXICILLIN AND CLAVULANATE POTASSIUM 1 TABLET: 875; 125 TABLET, FILM COATED ORAL at 09:04

## 2022-04-25 RX ADMIN — METOPROLOL SUCCINATE 50 MG: 50 TABLET, EXTENDED RELEASE ORAL at 08:04

## 2022-04-25 RX ADMIN — INSULIN ASPART 8 UNITS: 100 INJECTION, SOLUTION INTRAVENOUS; SUBCUTANEOUS at 06:04

## 2022-04-25 RX ADMIN — ONDANSETRON 8 MG: 2 INJECTION INTRAMUSCULAR; INTRAVENOUS at 05:04

## 2022-04-25 RX ADMIN — CLOPIDOGREL 75 MG: 75 TABLET, FILM COATED ORAL at 08:04

## 2022-04-25 RX ADMIN — ONDANSETRON 8 MG: 2 INJECTION INTRAMUSCULAR; INTRAVENOUS at 09:04

## 2022-04-25 RX ADMIN — APIXABAN 5 MG: 5 TABLET, FILM COATED ORAL at 08:04

## 2022-04-25 RX ADMIN — INSULIN ASPART 4 UNITS: 100 INJECTION, SOLUTION INTRAVENOUS; SUBCUTANEOUS at 12:04

## 2022-04-25 RX ADMIN — DOCUSATE SODIUM 50 MG: 50 CAPSULE, LIQUID FILLED ORAL at 08:04

## 2022-04-25 RX ADMIN — TAMSULOSIN HYDROCHLORIDE 0.4 MG: 0.4 CAPSULE ORAL at 08:04

## 2022-04-25 RX ADMIN — APIXABAN 5 MG: 5 TABLET, FILM COATED ORAL at 09:04

## 2022-04-25 RX ADMIN — PANTOPRAZOLE SODIUM 40 MG: 40 TABLET, DELAYED RELEASE ORAL at 08:04

## 2022-04-25 RX ADMIN — INSULIN ASPART 8 UNITS: 100 INJECTION, SOLUTION INTRAVENOUS; SUBCUTANEOUS at 08:04

## 2022-04-25 RX ADMIN — DOCUSATE SODIUM 50 MG: 50 CAPSULE, LIQUID FILLED ORAL at 09:04

## 2022-04-25 RX ADMIN — AMOXICILLIN AND CLAVULANATE POTASSIUM 1 TABLET: 875; 125 TABLET, FILM COATED ORAL at 08:04

## 2022-04-25 RX ADMIN — SENNOSIDES AND DOCUSATE SODIUM 1 TABLET: 50; 8.6 TABLET ORAL at 08:04

## 2022-04-25 RX ADMIN — INSULIN ASPART 14 UNITS: 100 INJECTION, SOLUTION INTRAVENOUS; SUBCUTANEOUS at 06:04

## 2022-04-25 RX ADMIN — LACOSAMIDE 100 MG: 100 TABLET, FILM COATED ORAL at 08:04

## 2022-04-25 RX ADMIN — SUCRALFATE 1 G: 1 TABLET ORAL at 06:04

## 2022-04-25 RX ADMIN — SUCRALFATE 1 G: 1 TABLET ORAL at 12:04

## 2022-04-25 RX ADMIN — AMIODARONE HYDROCHLORIDE 200 MG: 200 TABLET ORAL at 08:04

## 2022-04-25 RX ADMIN — SIMETHICONE 80 MG: 80 TABLET, CHEWABLE ORAL at 09:04

## 2022-04-25 RX ADMIN — SUCRALFATE 1 G: 1 TABLET ORAL at 08:04

## 2022-04-25 RX ADMIN — SENNOSIDES AND DOCUSATE SODIUM 1 TABLET: 50; 8.6 TABLET ORAL at 09:04

## 2022-04-25 RX ADMIN — POLYETHYLENE GLYCOL 3350 17 G: 17 POWDER, FOR SOLUTION ORAL at 08:04

## 2022-04-25 RX ADMIN — LACOSAMIDE 100 MG: 100 TABLET, FILM COATED ORAL at 09:04

## 2022-04-25 NOTE — SUBJECTIVE & OBJECTIVE
This encounter was provided through telemedicine.  Patient was transferred to the telemedicine service on:  04/22/2022   The patient location is: [id number] A admitted 4/11/2022  8:55 PM.  Present with the patient at the time of the telemed/virtual assessment: Telepresenter    Interval History/Overnight Events:       Required nausea med last pm and this pm - seems he may also have early satiety; carafate did not seem to change his condition    -seems he will be going home with home health at discharge    Review of Systems   Constitutional:  Positive for activity change and fatigue.   Respiratory:  Negative for cough.    Cardiovascular:  Negative for leg swelling.   Gastrointestinal:  Negative for diarrhea and vomiting.   Endocrine: Positive for polyuria.   Genitourinary:  Negative for dysuria.   Musculoskeletal:  Positive for myalgias (buttock pain making it difficulty to sit).   Neurological:  Positive for weakness.   Psychiatric/Behavioral:  Negative for behavioral problems and confusion.       Inpatient Medications:  Scheduled Meds:   amiodarone  200 mg Oral Daily    amoxicillin-clavulanate 875-125mg  1 tablet Oral Q12H    apixaban  5 mg Oral BID    atorvastatin  40 mg Oral Daily    clopidogreL  75 mg Oral Daily    docusate sodium  50 mg Oral BID    insulin aspart U-100  10-15 Units Subcutaneous TIDWM    insulin detemir U-100  7 Units Subcutaneous BID    lacosamide  100 mg Oral Q12H    metoprolol succinate  50 mg Oral Daily    pantoprazole  40 mg Oral Daily    polyethylene glycol  17 g Oral Daily    senna-docusate 8.6-50 mg  1 tablet Oral BID    sucralfate  1 g Oral QID (AC & HS)    tamsulosin  0.4 mg Oral Daily     Continuous Infusions:  PRN Meds:.acetaminophen, calcium carbonate, cloNIDine, dextrose 10%, diphenhydrAMINE, glucagon (human recombinant), hydrALAZINE, insulin aspart U-100, insulin aspart U-100, melatonin, ondansetron, prochlorperazine, senna, simethicone, sodium chloride 0.9%      Objective:      Temp:  [97.5 °F (36.4 °C)-98.1 °F (36.7 °C)] 97.5 °F (36.4 °C)  Pulse:  [54-72] 54  Resp:  [17-18] 18  SpO2:  [92 %-98 %] 97 %  BP: (100-145)/(49-88) 130/62      Intake/Output Summary (Last 24 hours) at 4/25/2022 1034  Last data filed at 4/25/2022 0745  Gross per 24 hour   Intake 1320 ml   Output 30 ml   Net 1290 ml          Body mass index is 25.09 kg/m².    Physical Exam  Vitals and nursing note reviewed.   Constitutional:       General: He is not in acute distress.     Appearance: He is normal weight. He is ill-appearing.   HENT:      Head: Normocephalic and atraumatic.      Right Ear: Hearing normal.      Left Ear: Hearing normal.      Nose: Nose normal.   Eyes:      General: No scleral icterus.        Right eye: No discharge.         Left eye: No discharge.      Extraocular Movements: Extraocular movements intact.   Cardiovascular:      Rate and Rhythm: Normal rate.   Pulmonary:      Effort: Pulmonary effort is normal. No accessory muscle usage or respiratory distress.   Skin:     Findings: No rash.   Neurological:      General: No focal deficit present.      Mental Status: He is alert and oriented to person, place, and time.      Cranial Nerves: No cranial nerve deficit.      Motor: No weakness.   Psychiatric:         Attention and Perception: Attention normal.         Mood and Affect: Affect is flat.         Speech: Speech normal.         Behavior: Behavior is cooperative.        Labs:  Recent Results (from the past 24 hour(s))   POCT glucose    Collection Time: 04/24/22 11:21 AM   Result Value Ref Range    POCT Glucose 321 (H) 70 - 110 mg/dL   Urinalysis, Reflex to Urine Culture Urine, Clean Catch    Collection Time: 04/24/22 12:19 PM    Specimen: Urine   Result Value Ref Range    Specimen UA Urine, Clean Catch     Color, UA Ellie Yellow, Straw, Ellie    Appearance, UA Cloudy (A) Clear    pH, UA 5.0 5.0 - 8.0    Specific Gravity, UA 1.025 1.005 - 1.030    Protein, UA 1+ (A) Negative    Glucose, UA 3+ (A)  Negative    Ketones, UA Trace (A) Negative    Bilirubin (UA) Negative Negative    Occult Blood UA 3+ (A) Negative    Nitrite, UA Negative Negative    Leukocytes, UA 2+ (A) Negative   Urinalysis Microscopic    Collection Time: 04/24/22 12:19 PM   Result Value Ref Range    RBC, UA >100 (H) 0 - 4 /hpf    WBC, UA 87 (H) 0 - 5 /hpf    Bacteria None None-Occ /hpf    Yeast, UA None None    Squam Epithel, UA 0 /hpf    Hyaline Casts, UA 23 (A) 0-1/lpf /lpf    Ca Oxalate Trish, UA Rare None-Moderate    Amorphous, UA Few None-Moderate    Microscopic Comment SEE COMMENT    POCT glucose    Collection Time: 04/24/22  4:03 PM   Result Value Ref Range    POCT Glucose 293 (H) 70 - 110 mg/dL   POCT glucose    Collection Time: 04/24/22  8:06 PM   Result Value Ref Range    POCT Glucose 90 70 - 110 mg/dL   CBC Auto Differential    Collection Time: 04/25/22  4:07 AM   Result Value Ref Range    WBC 7.29 3.90 - 12.70 K/uL    RBC 3.89 (L) 4.60 - 6.20 M/uL    Hemoglobin 11.4 (L) 14.0 - 18.0 g/dL    Hematocrit 34.4 (L) 40.0 - 54.0 %    MCV 88 82 - 98 fL    MCH 29.3 27.0 - 31.0 pg    MCHC 33.1 32.0 - 36.0 g/dL    RDW 16.0 (H) 11.5 - 14.5 %    Platelets 225 150 - 450 K/uL    MPV 9.4 9.2 - 12.9 fL    Immature Granulocytes 0.3 0.0 - 0.5 %    Gran # (ANC) 4.2 1.8 - 7.7 K/uL    Immature Grans (Abs) 0.02 0.00 - 0.04 K/uL    Lymph # 2.1 1.0 - 4.8 K/uL    Mono # 0.5 0.3 - 1.0 K/uL    Eos # 0.4 0.0 - 0.5 K/uL    Baso # 0.06 0.00 - 0.20 K/uL    nRBC 0 0 /100 WBC    Gran % 58.2 38.0 - 73.0 %    Lymph % 28.4 18.0 - 48.0 %    Mono % 7.0 4.0 - 15.0 %    Eosinophil % 5.3 0.0 - 8.0 %    Basophil % 0.8 0.0 - 1.9 %    Differential Method Automated    Comprehensive Metabolic Panel    Collection Time: 04/25/22  4:07 AM   Result Value Ref Range    Sodium 136 136 - 145 mmol/L    Potassium 4.5 3.5 - 5.1 mmol/L    Chloride 101 95 - 110 mmol/L    CO2 26 23 - 29 mmol/L    Glucose 246 (H) 70 - 110 mg/dL    BUN 21 8 - 23 mg/dL    Creatinine 0.9 0.5 - 1.4 mg/dL    Calcium  8.3 (L) 8.7 - 10.5 mg/dL    Total Protein 5.5 (L) 6.0 - 8.4 g/dL    Albumin 2.4 (L) 3.5 - 5.2 g/dL    Total Bilirubin 0.5 0.1 - 1.0 mg/dL    Alkaline Phosphatase 103 55 - 135 U/L    AST 52 (H) 10 - 40 U/L    ALT 36 10 - 44 U/L    Anion Gap 9 8 - 16 mmol/L    eGFR if African American >60.0 >60 mL/min/1.73 m^2    eGFR if non African American >60.0 >60 mL/min/1.73 m^2   Magnesium    Collection Time: 04/25/22  4:07 AM   Result Value Ref Range    Magnesium 1.5 (L) 1.6 - 2.6 mg/dL   Phosphorus    Collection Time: 04/25/22  4:07 AM   Result Value Ref Range    Phosphorus 2.9 2.7 - 4.5 mg/dL   POCT glucose    Collection Time: 04/25/22  5:15 AM   Result Value Ref Range    POCT Glucose 287 (H) 70 - 110 mg/dL   POCT glucose    Collection Time: 04/25/22  7:31 AM   Result Value Ref Range    POCT Glucose 318 (H) 70 - 110 mg/dL        Lab Results   Component Value Date    [id number] Negative 04/05/2022       Recent Labs   Lab 04/23/22 0303 04/24/22 0417 04/25/22  0407   WBC 5.53 7.25 7.29   LYMPH 26.0  1.4 25.1  1.8 28.4  2.1   HGB 11.7* 11.4* 11.4*   HCT 35.1* 34.4* 34.4*    230 225       Recent Labs   Lab 04/23/22 0303 04/24/22 0417 04/25/22  0407    135* 136   K 4.1 4.5 4.5    102 101   CO2 25 27 26   BUN 15 16 21   CREATININE 0.8 0.9 0.9   * 259* 246*   CALCIUM 8.1* 8.2* 8.3*   MG 1.6 1.4* 1.5*   PHOS 3.2 3.1 2.9       Recent Labs   Lab 04/23/22 0303 04/24/22 0417 04/25/22  0407   ALKPHOS 111 105 103   ALT 38 32 36   AST 56* 33 52*   ALBUMIN 2.5* 2.3* 2.4*   PROT 5.6* 5.7* 5.5*   BILITOT 0.4 0.5 0.5          No results for input(s): DDIMER, FERRITIN, CRP, LDH, BNP, TROPONINI, CPK in the last 72 hours.    Invalid input(s): PROCALCITONIN    All labs within the last 24 hours were reviewed.     Microbiology:  Microbiology Results (last 7 days)       Procedure Component Value Units Date/Time    Urine culture [[id number]] Collected: 04/24/22 1219    Order Status: No result Specimen: Urine Updated:  04/24/22 1243    Culture, Respiratory with Gram Stain [[id number]]     Order Status: No result Specimen: Respiratory     AFB Culture & Smear [[id number]]     Order Status: No result Specimen: Sputum               Imaging  ECG Results              EKG 12-lead (Final result)  Result time 04/12/22 12:51:09      Final result by Interface, Lab In [facility] (04/12/22 12:51:09)                   Narrative:    Test Reason : R73.9,    Vent. Rate : 118 BPM     Atrial Rate : 111 BPM     P-R Int : 000 ms          QRS Dur : 162 ms      QT Int : 436 ms       P-R-T Axes : 000 -14 052 degrees     QTc Int : 611 ms    Wide QRS rhythm  Right bundle branch block  ST elevation anterior leads differential includes anterior STEMI vs,  repolarization abnormality  Abnormal ECG  When compared with ECG of 05-APR-2022 11:41,  Wide QRS rhythm has replaced Sinus rhythm  Vent. rate has increased BY  48 BPM  Confirmed by Megha Stock MD ([id number]) on 4/12/2022 12:51:00 PM    Referred By: [email or web address]   SELF           Confirmed By:Megha Stock MD                                    Results for orders placed during the hospital encounter of 01/25/22    Echo    Interpretation Summary  · There is abnormal septal wall motion.  · The left ventricle is normal in size with low normal systolic function.  · The estimated ejection fraction is 50%.  · Normal left ventricular diastolic function.  · Mild left atrial enlargement.  · Normal right ventricular size with normal right ventricular systolic function.  · Mild mitral regurgitation.  · There are segmental left ventricular wall motion abnormalities.  · Mild tricuspid regurgitation.  · Elevated central venous pressure (15 mmHg).      CT Chest Without Contrast  Narrative: EXAMINATION:  CT CHEST WITHOUT CONTRAST    CLINICAL HISTORY:  pulmonary nodule, follow up;    TECHNIQUE:  Low dose axial images, sagittal and coronal reformations were obtained from the thoracic inlet to the lung bases. Contrast was not  administered.    COMPARISON:  Chest x-ray 04/11/2022, 03/05/2022; CT chest 12/01/2021, 09/23/2021, 04/14/2021.    FINDINGS:  SOFT TISSUES:  Unremarkable.    HEART AND MEDIASTINUM:  Several slightly prominent mediastinal lymph nodes, including 1.6 cm level 4R right lower paratracheal node (axial series 2, image 51), grossly stable compared to prior.  Multi-vessel coronary arterial calcific plaques ranging from mild to moderate-severe.  Scattered calcific plaques in the visualized aorta.    PLEURA:  Unremarkable.    LUNGS AND AIRWAYS:  Airways are patent.  Advanced bilateral centrilobular and paraseptal emphysema with subpleural reticulations and bandlike opacities in the bilateral upper lobes with interval improvement in interlobular septal thickening.    There are new ground-glass opacities in the dependent portions of the right upper lobe and the right lower lobe, consider aspiration.    There is a new 7 mm right lower lobe posterior segment nodule (4-331)    There are several stable to mildly improved, bilateral solid pulmonary nodules with index lesions as follows:    *Evolving appearance of a right upper lobe posterior segment cavitary lesion with internal dependent mass; the cavity demonstrates a thinner wall and has decreased in diameter.  There is adjacent contracture of parenchyma.  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  The central debris is decreased in size,, now measuring 1.0 cm (axial series 4, image 81), previously 2.0 cm.  *Triangular 1.4 cm nodule in the posterior segment of the right upper lobe (axial series 4, image 181) previously 1.8 cm  *Stable appearance of previously described 1.0 cm solid nodule in the posterior segment of the right upper lobe (4-277, prior exam 6-290).  *Stable 12 mm right upper lobe triangular opacity (4-203, prior 6-183).  *Stable 0.9 cm solid nodule in the superior segment of the right lower lobe (axial series 4, image 37), previously 0.9  cm.  ESOPHAGUS:  Unremarkable.    UPPER ABDOMEN (limited):  Left renal cortical hypodensities, likely simple cysts.  Otherwise unremarkable.    BONES: Degenerative changes of the thoracic spine.  No fractures or focal osseous lesions.  Impression: 1. There are several stable pulmonary nodules as described above.  2. There are new ground-glass opacities in the dependent portions of the right lung, consider aspiration.  3. There is a new 7 mm right lower lobe posterior segment nodule.  This may reflect infectious/inflammatory etiology given other findings in the chest.  Clinical considerations will determine if further investigation of this finding is to be pursued.    Electronically signed by resident: Ernesto Lui  Date:    04/19/2022  Time:    08:25    Electronically signed by: Katt Rubi  Date:    04/19/2022  Time:    10:02      All imaging within the last 24 hours was reviewed.       Discharge Planning   ALLIE: 4/27/2022     Code Status: DNR   Is the patient medically ready for discharge?: No    Reason for patient still in hospital (select all that apply): Patient trending condition, Treatment, Consult recommendations, and Pending disposition  Discharge Plan A: Skilled Nursing Facility   Discharge Delays: None known at this time

## 2022-04-25 NOTE — SUBJECTIVE & OBJECTIVE
"Interval HPI:   Glucose trend-will above goal  this morning  No hypoglycemia   Overnight events: none  Eatin%  Nausea: No  Hypoglycemia and intervention: No  Fever: No  TPN and/or TF: No  If yes, type of TF/TPN and rate: na    BP (!) 104/55 (BP Location: Left arm, Patient Position: Lying)   Pulse 64   Temp 98.1 °F (36.7 °C) (Oral)   Resp 17   Ht 5' 11" (1.803 m)   Wt 81.6 kg (179 lb 14.3 oz)   SpO2 95%   BMI 25.09 kg/m²     Labs Reviewed and Include    Recent Labs   Lab 22  0407   *   CALCIUM 8.3*   ALBUMIN 2.4*   PROT 5.5*      K 4.5   CO2 26      BUN 21   CREATININE 0.9   ALKPHOS 103   ALT 36   AST 52*   BILITOT 0.5     Lab Results   Component Value Date    WBC 7.29 2022    HGB 11.4 (L) 2022    HCT 34.4 (L) 2022    MCV 88 2022     2022     No results for input(s): TSH, FREET4 in the last 168 hours.  Lab Results   Component Value Date    HGBA1C 9.7 (H) 2022       Nutritional status:   Body mass index is 25.09 kg/m².  Lab Results   Component Value Date    ALBUMIN 2.4 (L) 2022    ALBUMIN 2.3 (L) 2022    ALBUMIN 2.5 (L) 2022     No results found for: PREALBUMIN    Estimated Creatinine Clearance: 72 mL/min (based on SCr of 0.9 mg/dL).    Accu-Checks  Recent Labs     22  1216 22  1545 22  2039 22  0439 22  0738 22  1121 22  1603 22  0515 22  0731   POCTGLUCOSE 123* 142* 141* 258* 321* 321* 293* 90 287* 318*       Current Medications and/or Treatments Impacting Glycemic Control  Immunotherapy:    Immunosuppressants       None          Steroids:   Hormones (From admission, onward)                Start     Stop Route Frequency Ordered    22 1047  melatonin tablet 6 mg  (Medication Panel)         -- Oral Nightly PRN 22 0948          Pressors:    Autonomic Drugs (From admission, onward)                None          Hyperglycemia/Diabetes " Medications:   Antihyperglycemics (From admission, onward)                Start     Stop Route Frequency Ordered    04/25/22 0931  insulin aspart U-100 pen 1-10 Units         -- SubQ Before meals & nightly PRN 04/25/22 0832    04/25/22 0900  insulin detemir U-100 pen 7 Units         -- SubQ 2 times daily 04/25/22 0832    04/25/22 0845  insulin aspart U-100 pen 10-15 Units         -- SubQ 3 times daily with meals 04/25/22 0832    04/17/22 0111  insulin aspart U-100 pen 4 Units         -- SubQ With snacks 04/17/22 0012

## 2022-04-25 NOTE — PROGRESS NOTES
[facility] - Telemetry [facility] Medicine  Telemedicine Progress Note    Patient Name: Kamar Muñoz  MRN: [id number]  Patient Class: IP- Inpatient   Admission Date: 4/11/2022  Length of Stay: 14 days  Attending Physician: Komal Huynh MD  Primary Care Provider: Basim Guerrero MD          Subjective:     Principal Problem:Pulmonary cavitary lesion        HPI:  Mr. Kamar Muñoz is a 76 y.o. male with a past medical history of HTN, Type 2 DM, CAD, STEMI, HLD, paroxysmal Afib, CVA, seizures and recurrent DKA.  [place] [facility] [place] with elevated blood glucose.  He was discharged [place] [facility] [place] 4/10 after being admitted for DKA on 4/5.  At time of exam patient is alert but altered and unable to provide any history.  Spoke with patient's daughter who states she is a primary caregiver at home and obtained history as well as review of chart.  Per family he was not feeling well after discharge to home and vomited several times on 4/11. Unknown characteristics of emesis. Finger stick at home read High with unreadable blood glucose.  At this time daughter gave him 14 units of insulin and sublingual zofran. Other than malaise and nausea patient was not exhibiting any other signs/symptoms at home.  In ER BG found to be 1015 with pCO2 6 and anion gap 35.  Hyperkalemic with K 7.0 and some EKG changes when compared to previous EKG.  Given calcium gluconate, and started on insulin gtt as well as subQ long acting insulin.      Critical Care Medicine consulted for DKA and admitted to MICU.        Overview/Hospital Course:  Admitted to MICU on 4/11 for DKA with BG >1000, pCO2 6, AG 35, and hyperkalemia.  Given Calcium gluconate and started on insulin gtt in ED.  Hyperkalemia not improved on repeat labs and bolused with IV insulin.  Infectious workup sent and broad spectrum abx started.  4/12 potassium improving with insulin. 4/13 Gap closed, insulin gtt turned off and switched to subq insulin.  Endocrine consulted for insulin mgmt. He was stepped down to  4/14.    Since step down stable. Advanced diet. Endocrine following and titrating insulin. Poor po intake making it difficult to adjust insulin. CT chest with cavitary lesion, pulmonary and ID consulted. SNF once medically stable for dc.        This encounter was provided through telemedicine.  Patient was transferred to the telemedicine service on:  04/22/2022   The [facility] location is: [facility] admitted 4/11/2022  8:55 PM.  Present with the patient at the time of the telemed/virtual assessment: Telepresenter    Interval History/Overnight Events:       Required nausea med last pm and this pm - seems he may also have early satiety; carafate did not seem to change his condition    -seems he will be going home with home health at discharge    Review of Systems   Constitutional:  Positive for activity change and fatigue.   Respiratory:  Negative for cough.    Cardiovascular:  Negative for leg swelling.   Gastrointestinal:  Negative for diarrhea and vomiting.   Endocrine: Positive for polyuria.   Genitourinary:  Negative for dysuria.   Musculoskeletal:  Positive for myalgias (buttock pain making it difficulty to sit).   Neurological:  Positive for weakness.   Psychiatric/Behavioral:  Negative for behavioral problems and confusion.       Inpatient Medications:  Scheduled Meds:   amiodarone  200 mg Oral Daily    amoxicillin-clavulanate 875-125mg  1 tablet Oral Q12H    apixaban  5 mg Oral BID    atorvastatin  40 mg Oral Daily    clopidogreL  75 mg Oral Daily    docusate sodium  50 mg Oral BID    insulin aspart U-100  10-15 Units Subcutaneous TIDWM    insulin detemir U-100  7 Units Subcutaneous BID    lacosamide  100 mg Oral Q12H    metoprolol succinate  50 mg Oral Daily    pantoprazole  40 mg Oral Daily    polyethylene glycol  17 g Oral Daily    senna-docusate 8.6-50 mg  1 tablet Oral BID    sucralfate  1 g Oral QID (AC & HS)    tamsulosin   0.4 mg Oral Daily     Continuous Infusions:  PRN Meds:.acetaminophen, calcium carbonate, cloNIDine, dextrose 10%, diphenhydrAMINE, glucagon (human recombinant), hydrALAZINE, insulin aspart U-100, insulin aspart U-100, melatonin, ondansetron, prochlorperazine, senna, simethicone, sodium chloride 0.9%      Objective:     Temp:  [97.5 °F (36.4 °C)-98.1 °F (36.7 °C)] 97.5 °F (36.4 °C)  Pulse:  [54-72] 54  Resp:  [17-18] 18  SpO2:  [92 %-98 %] 97 %  BP: (100-145)/(49-88) 130/62      Intake/Output Summary (Last 24 hours) at 4/25/2022 1034  Last data filed at 4/25/2022 0745  Gross per 24 hour   Intake 1320 ml   Output 30 ml   Net 1290 ml          Body mass index is 25.09 kg/m².    Physical Exam  Vitals and nursing note reviewed.   Constitutional:       General: He is not in acute distress.     Appearance: He is normal weight. He is ill-appearing.   HENT:      Head: Normocephalic and atraumatic.      Right Ear: Hearing normal.      Left Ear: Hearing normal.      Nose: Nose normal.   Eyes:      General: No scleral icterus.        Right eye: No discharge.         Left eye: No discharge.      Extraocular Movements: Extraocular movements intact.   Cardiovascular:      Rate and Rhythm: Normal rate.   Pulmonary:      Effort: Pulmonary effort is normal. No accessory muscle usage or respiratory distress.   Skin:     Findings: No rash.   Neurological:      General: No focal deficit present.      Mental Status: He is alert and oriented to person, place, and time.      Cranial Nerves: No cranial nerve deficit.      Motor: No weakness.   Psychiatric:         Attention and Perception: Attention normal.         Mood and Affect: Affect is flat.         Speech: Speech normal.         Behavior: Behavior is cooperative.        Labs:  Recent Results (from the past 24 hour(s))   POCT glucose    Collection Time: 04/24/22 11:21 AM   Result Value Ref Range    POCT Glucose 321 (H) 70 - 110 mg/dL   Urinalysis, Reflex to Urine Culture Urine, Clean  Catch    Collection Time: 04/24/22 12:19 PM    Specimen: Urine   Result Value Ref Range    Specimen UA Urine, Clean Catch     Color, UA Ellie Yellow, Straw, Ellie    Appearance, UA Cloudy (A) Clear    pH, UA 5.0 5.0 - 8.0    Specific Gravity, UA 1.025 1.005 - 1.030    Protein, UA 1+ (A) Negative    Glucose, UA 3+ (A) Negative    Ketones, UA Trace (A) Negative    Bilirubin (UA) Negative Negative    Occult Blood UA 3+ (A) Negative    Nitrite, UA Negative Negative    Leukocytes, UA 2+ (A) Negative   Urinalysis Microscopic    Collection Time: 04/24/22 12:19 PM   Result Value Ref Range    RBC, UA >100 (H) 0 - 4 /hpf    WBC, UA 87 (H) 0 - 5 /hpf    Bacteria None None-Occ /hpf    Yeast, UA None None    Squam Epithel, UA 0 /hpf    Hyaline Casts, UA 23 (A) 0-1/lpf /lpf    Ca Oxalate Trish, UA Rare None-Moderate    Amorphous, UA Few None-Moderate    Microscopic Comment SEE COMMENT    POCT glucose    Collection Time: 04/24/22  4:03 PM   Result Value Ref Range    POCT Glucose 293 (H) 70 - 110 mg/dL   POCT glucose    Collection Time: 04/24/22  8:06 PM   Result Value Ref Range    POCT Glucose 90 70 - 110 mg/dL   CBC Auto Differential    Collection Time: 04/25/22  4:07 AM   Result Value Ref Range    WBC 7.29 3.90 - 12.70 K/uL    RBC 3.89 (L) 4.60 - 6.20 M/uL    Hemoglobin 11.4 (L) 14.0 - 18.0 g/dL    Hematocrit 34.4 (L) 40.0 - 54.0 %    MCV 88 82 - 98 fL    MCH 29.3 27.0 - 31.0 pg    MCHC 33.1 32.0 - 36.0 g/dL    RDW 16.0 (H) 11.5 - 14.5 %    Platelets 225 150 - 450 K/uL    MPV 9.4 9.2 - 12.9 fL    Immature Granulocytes 0.3 0.0 - 0.5 %    Gran # (ANC) 4.2 1.8 - 7.7 K/uL    Immature Grans (Abs) 0.02 0.00 - 0.04 K/uL    Lymph # 2.1 1.0 - 4.8 K/uL    Mono # 0.5 0.3 - 1.0 K/uL    Eos # 0.4 0.0 - 0.5 K/uL    Baso # 0.06 0.00 - 0.20 K/uL    nRBC 0 0 /100 WBC    Gran % 58.2 38.0 - 73.0 %    Lymph % 28.4 18.0 - 48.0 %    Mono % 7.0 4.0 - 15.0 %    Eosinophil % 5.3 0.0 - 8.0 %    Basophil % 0.8 0.0 - 1.9 %    Differential Method Automated     Comprehensive Metabolic Panel    Collection Time: 04/25/22  4:07 AM   Result Value Ref Range    Sodium 136 136 - 145 mmol/L    Potassium 4.5 3.5 - 5.1 mmol/L    Chloride 101 95 - 110 mmol/L    CO2 26 23 - 29 mmol/L    Glucose 246 (H) 70 - 110 mg/dL    BUN 21 8 - 23 mg/dL    Creatinine 0.9 0.5 - 1.4 mg/dL    Calcium 8.3 (L) 8.7 - 10.5 mg/dL    Total Protein 5.5 (L) 6.0 - 8.4 g/dL    Albumin 2.4 (L) 3.5 - 5.2 g/dL    Total Bilirubin 0.5 0.1 - 1.0 mg/dL    Alkaline Phosphatase 103 55 - 135 U/L    AST 52 (H) 10 - 40 U/L    ALT 36 10 - 44 U/L    Anion Gap 9 8 - 16 mmol/L    eGFR if African American >60.0 >60 mL/min/1.73 m^2    eGFR if non African American >60.0 >60 mL/min/1.73 m^2   Magnesium    Collection Time: 04/25/22  4:07 AM   Result Value Ref Range    Magnesium 1.5 (L) 1.6 - 2.6 mg/dL   Phosphorus    Collection Time: 04/25/22  4:07 AM   Result Value Ref Range    Phosphorus 2.9 2.7 - 4.5 mg/dL   POCT glucose    Collection Time: 04/25/22  5:15 AM   Result Value Ref Range    POCT Glucose 287 (H) 70 - 110 mg/dL   POCT glucose    Collection Time: 04/25/22  7:31 AM   Result Value Ref Range    POCT Glucose 318 (H) 70 - 110 mg/dL        Lab Results   Component Value Date    [id number] Negative 04/05/2022       Recent Labs   Lab 04/23/22  0303 04/24/22 0417 04/25/22  0407   WBC 5.53 7.25 7.29   LYMPH 26.0  1.4 25.1  1.8 28.4  2.1   HGB 11.7* 11.4* 11.4*   HCT 35.1* 34.4* 34.4*    230 225       Recent Labs   Lab 04/23/22  0303 04/24/22  0417 04/25/22  0407    135* 136   K 4.1 4.5 4.5    102 101   CO2 25 27 26   BUN 15 16 21   CREATININE 0.8 0.9 0.9   * 259* 246*   CALCIUM 8.1* 8.2* 8.3*   MG 1.6 1.4* 1.5*   PHOS 3.2 3.1 2.9       Recent Labs   Lab 04/23/22  0303 04/24/22  0417 04/25/22  0407   ALKPHOS 111 105 103   ALT 38 32 36   AST 56* 33 52*   ALBUMIN 2.5* 2.3* 2.4*   PROT 5.6* 5.7* 5.5*   BILITOT 0.4 0.5 0.5          No results for input(s): DDIMER, FERRITIN, CRP, LDH, BNP, TROPONINI,  CPK in the last 72 hours.    Invalid input(s): PROCALCITONIN    All labs within the last 24 hours were reviewed.     Microbiology:  Microbiology Results (last 7 days)       Procedure Component Value Units Date/Time    Urine culture [[id number]] Collected: 04/24/22 1219    Order Status: No result Specimen: Urine Updated: 04/24/22 1243    Culture, Respiratory with Gram Stain [[id number]]     Order Status: No result Specimen: Respiratory     AFB Culture & Smear [[id number]]     Order Status: No result Specimen: Sputum               Imaging  ECG Results              EKG 12-lead (Final result)  Result time 04/12/22 12:51:09      Final result by Interface, Lab In [facility] (04/12/22 12:51:09)                   Narrative:    Test Reason : R73.9,    Vent. Rate : 118 BPM     Atrial Rate : 111 BPM     P-R Int : 000 ms          QRS Dur : 162 ms      QT Int : 436 ms       P-R-T Axes : 000 -14 052 degrees     QTc Int : 611 ms    Wide QRS rhythm  Right bundle branch block  ST elevation anterior leads differential includes anterior STEMI vs,  repolarization abnormality  Abnormal ECG  When compared with ECG of 05-APR-2022 11:41,  Wide QRS rhythm has replaced Sinus rhythm  Vent. rate has increased BY  48 BPM  Confirmed by Megha Stock MD ([id number]) on 4/12/2022 12:51:00 PM    Referred By: [email or web address]   SELF           Confirmed By:Megha Stock MD                                    Results for orders placed during the hospital encounter of 01/25/22    Echo    Interpretation Summary  · There is abnormal septal wall motion.  · The left ventricle is normal in size with low normal systolic function.  · The estimated ejection fraction is 50%.  · Normal left ventricular diastolic function.  · Mild left atrial enlargement.  · Normal right ventricular size with normal right ventricular systolic function.  · Mild mitral regurgitation.  · There are segmental left ventricular wall motion abnormalities.  · Mild tricuspid regurgitation.  · Elevated  central venous pressure (15 mmHg).      CT Chest Without Contrast  Narrative: EXAMINATION:  CT CHEST WITHOUT CONTRAST    CLINICAL HISTORY:  pulmonary nodule, follow up;    TECHNIQUE:  Low dose axial images, sagittal and coronal reformations were obtained from the thoracic inlet to the lung bases. Contrast was not administered.    COMPARISON:  Chest x-ray 04/11/2022, 03/05/2022; CT chest 12/01/2021, 09/23/2021, 04/14/2021.    FINDINGS:  SOFT TISSUES:  Unremarkable.    HEART AND MEDIASTINUM:  Several slightly prominent mediastinal lymph nodes, including 1.6 cm level 4R right lower paratracheal node (axial series 2, image 51), grossly stable compared to prior.  Multi-vessel coronary arterial calcific plaques ranging from mild to moderate-severe.  Scattered calcific plaques in the visualized aorta.    PLEURA:  Unremarkable.    LUNGS AND AIRWAYS:  Airways are patent.  Advanced bilateral centrilobular and paraseptal emphysema with subpleural reticulations and bandlike opacities in the bilateral upper lobes with interval improvement in interlobular septal thickening.    There are new ground-glass opacities in the dependent portions of the right upper lobe and the right lower lobe, consider aspiration.    There is a new 7 mm right lower lobe posterior segment nodule (4-331)    There are several stable to mildly improved, bilateral solid pulmonary nodules with index lesions as follows:    *Evolving appearance of a right upper lobe posterior segment cavitary lesion with internal dependent mass; the cavity demonstrates a thinner wall and has decreased in diameter.  There is adjacent contracture of parenchyma.  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  The central debris is decreased in size,, now measuring 1.0 cm (axial series 4, image 81), previously 2.0 cm.  *Triangular 1.4 cm nodule in the posterior segment of the right upper lobe (axial series 4, image 181) previously 1.8 cm  *Stable appearance  "of previously described 1.0 cm solid nodule in the posterior segment of the right upper lobe (4-277, prior exam 6-290).  *Stable 12 mm right upper lobe triangular opacity (4-203, prior 6-183).  *Stable 0.9 cm solid nodule in the superior segment of the right lower lobe (axial series 4, image 37), previously 0.9 cm.  ESOPHAGUS:  Unremarkable.    UPPER ABDOMEN (limited):  Left renal cortical hypodensities, likely simple cysts.  Otherwise unremarkable.    BONES: Degenerative changes of the thoracic spine.  No fractures or focal osseous lesions.  Impression: 1. There are several stable pulmonary nodules as described above.  2. There are new ground-glass opacities in the dependent portions of the right lung, consider aspiration.  3. There is a new 7 mm right lower lobe posterior segment nodule.  This may reflect infectious/inflammatory etiology given other findings in the chest.  Clinical considerations will determine if further investigation of this finding is to be pursued.    Electronically signed by resident: Ernesto Lui  Date:    04/19/2022  Time:    08:25    Electronically signed by: Katt Rubi  Date:    04/19/2022  Time:    10:02      All imaging within the last 24 hours was reviewed.       Discharge Planning   ALLIE: 4/27/2022     Code Status: DNR   Is the patient medically ready for discharge?: No    Reason for patient still in hospital (select all that apply): Patient trending condition, Treatment, Consult recommendations, and Pending disposition  Discharge Plan A: Skilled Nursing Facility   Discharge Delays: None known at this time       Assessment/Plan:      * Pulmonary cavitary lesion  - As per CT, noted about a month ago  - Repeat CT Chest without contrast obtained on 4/19 revealed "an evolving appearance of a right upper lobe posterior segment cavitary lesion with internal dependent mass; the cavity demonstrates a thinner wall and has decreased in diameter.  There is adjacent contracture of " "parenchyma.  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  The central debris is decreased in size,, now measuring 1.0 cm (axial series 4, image 81), previously 2.0 cm, and various other nodules".   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  - 1/11/2022 Culture with MAC pending susceptibilities   - 1/11/2022 2nd AFB culture with pending results  - Differential includes MAC, aspergillosis, chronic aspiration and less likely malignancy   - Pulmonary rec Augmentin x 7 days   - Induced sputum and send for cultures ordered, and AFB - has not been able to supply sample  - ID consulted and recommend repeat AFB sputum  - Poor candidate for surgical intervention given he is on triple therapy (eliquis + DAPT) for both afib and recent STEMI with LAUREN stent placed in January  - suspect his weight loss and recent decline worsened by underlying infection  - Repeat Chest CT in 3 months - 7/2022   - No indication for bronchoscopy at this time  - Can continue follow up with Dr. Louie outpatient.   - On RA and denies respiratory symptoms      Bacterial pneumonia  Complete 7 days of augmentin      Goals of care, counseling/discussion  - DNR as per ICU    Functional urinary incontinence  Remains requiring adult diapers  Post for residual ordered to assess for retention  Initiate time toileting for bladder training      Severe malnutrition  Nutrition consulted. Most recent weight and BMI monitored- Body mass index is 25.09 kg/m².       Malnutrition (Moderate to Severe)  Weight Loss (Malnutrition): greater than 7.5% in 3 months  Energy Intake (Malnutrition): less than 75% for greater than or equal to 1 month     -he is drinking more boost in eating food so will increased boost supplements to 2 servings with each tray.         Measurements:  Wt Readings from Last 1 Encounters:   04/19/22 81.6 kg (179 lb 14.3 oz)   Body mass index is 25.09 kg/m².    Recommendations: " Recommendation/Intervention: 1. Continue current Diabetic diet + ONS. 2. RD to monitor & follow-up.  Goals: Meet % EEN, EPN by RD f/u date    Patient has been screened and assessed by RD. RD will follow patient.    Discharge planning issues  PT/OT recommends skilled nursing facility ongoing therapy; patient acknowledges and agrees with the need for ongoing therapy but unfortunately he is required to pay a co-pay for further SNF days.  He feels his only options to go home with limited sitter assistance and home health; his son has made arrangements for him to go to his preferred facility, [facility] but the patient feels he cannot manage the financial implications of going there; patient's son is committed to getting patient to [facility] which is likely his safest disposition      Ketosis-prone diabetes mellitus  - Interval history and physical exam findings as described above  - DKA now resolved  - Working to optimize BG control  - Endocrine following and adjusting insulin regimen as needed  - SSI provided for corrective dosing  - DXTs as ordered  - Hypoglycemic protocol in effect  -Endocrine following due to erratic glucoses; notified of plan to discharge home in light of re-admits with DKA and eats regular diet at home    Focal seizures  - History of seizures treated with lacosamide.    - Continue home lacosamide    Coronary artery disease  - Stable  - Continue home regimen of aspirin, atorvastatin, clopidogrel, losartan, and metoprolol      Paroxysmal atrial fibrillation  - History of afib.    - Continue home regimen of amiodarone, apixaban, and metoprolol        Essential hypertension  - resume home meds as tolerates        VTE Risk Mitigation (From admission, onward)         Ordered     apixaban tablet 5 mg  2 times daily         04/20/22 1209     IP VTE HIGH RISK PATIENT  Once         04/11/22 2842     Place sequential compression device  Until discontinued         04/11/22 4232               I have assessed these findings virtually using a telemed platform and with assistance of the bedside nurse.          The attending portion of this evaluation, treatment, and documentation was performed per Komal Huynh MD via Telemedicine AudioVisual using the secure [employer] software platform with 2 way audio/video. The provider was located off-site and the patient is located in the hospital. The aforementioned video software was utilized to document the relevant history and physical exam    Komal Huynh MD  Department of Hospital Medicine   [facility] - Telemetry Stepdown

## 2022-04-25 NOTE — ASSESSMENT & PLAN NOTE
Key History and Diagnostic Findings  -  Admit for recurrent DKA  - A1c of 9.7 on 2022 from 11.5 on 2022  - Home Regimen: Levemir 8 units daily, aspart 8 units TIDWM  - Weight based dosin kg x 0.5 = 41 TDD x 0.5 = 20 basal / 20 prandial  - 1700/TDD = 41 (estimated insulin sensitivity factor)  - 450/TDD = 11 (estimated starting carb ratio for prandial dosing)  - Glucose Goals: 160-200mg/dL  - 24 hour glucose trend:   above goal  -  Variable diet    Plan  -  Levemir to 6 units BID  -  Aspart to 10-15 units TIDWM   -  Moderate correction scale    -  Hypoglycemia protocol in place  -  If blood glucose greater than 300, please ask patient not to eat food or drink anything other than water until correctional insulin has brought it back below 250  -  Please ensure patient receives their p.r.n. insulin aspart if they eat a snack with more than 30 g of carbohydrate  -  Daughter wishes to pursue skilled nursing facility for discharge as she states he cannot be adequately taken care of at home

## 2022-04-25 NOTE — ASSESSMENT & PLAN NOTE
"- As per CT, noted about a month ago  - Repeat CT Chest without contrast obtained on 4/19 revealed "an evolving appearance of a right upper lobe posterior segment cavitary lesion with internal dependent mass; the cavity demonstrates a thinner wall and has decreased in diameter.  There is adjacent contracture of parenchyma.  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  The central debris is decreased in size,, now measuring 1.0 cm (axial series 4, image 81), previously 2.0 cm, and various other nodules".   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  - 1/11/2022 Culture with MAC pending susceptibilities   - 1/11/2022 2nd AFB culture with pending results  - Differential includes MAC, aspergillosis, chronic aspiration and less likely malignancy   - Pulmonary rec Augmentin x 7 days   - Induced sputum and send for cultures ordered, and AFB - has not been able to supply sample  - ID consulted and recommend repeat AFB sputum  - Poor candidate for surgical intervention given he is on triple therapy (eliquis + DAPT) for both afib and recent STEMI with LAUREN stent placed in January  - suspect his weight loss and recent decline worsened by underlying infection  - Repeat Chest CT in 3 months - 7/2022   - No indication for bronchoscopy at this time  - Can continue follow up with Dr. Louie outpatient.   - On RA and denies respiratory symptoms    "

## 2022-04-25 NOTE — ASSESSMENT & PLAN NOTE
- Interval history and physical exam findings as described above  - DKA now resolved  - Working to optimize BG control  - Endocrine following and adjusting insulin regimen as needed  - SSI provided for corrective dosing  - DXTs as ordered  - Hypoglycemic protocol in effect  -Endocrine following due to erratic glucoses; notified of plan to discharge home in light of re-admits with DKA and eats regular diet at home

## 2022-04-25 NOTE — PROGRESS NOTES
[facility] - Telemetry Stepdown  Endocrinology  Progress Note    Admit Date: 4/11/2022     76 year old  male with T2DM, HTN, CAD, STEMI, HLD, paroxysmal Afib, CVA, seizures who presents for recurrent DKA.  He presented to [facility] ED on 4/11 with elevated blood glucose.  He was discharged from [facility] on 4/10 after being admitted for DKA on 4/5. Per family he was not feeling well after discharge to home and vomited several times on 4/11. Finger stick at home read High with unreadable blood glucose.  At this time daughter gave him 14 units of insulin and sublingual zofran. Other than malaise and nausea patient was not exhibiting any other signs/symptoms at home.    - In ER BG found to be 1015 with pCO2 6 and anion gap 35.  Hyperkalemic with K 7.0 and some EKG changes when compared to previous EKG.  Given calcium gluconate, and started on insulin gtt as well as subQ long acting insulin     - Endocrinology consulted for management of type 2 diabetes after resolution of DKA    - Spoke with daughter who states patient was discharged on 04/10/2022 with high glucose in the 400s which worsened after getting home and eating dinner; appropriate mealtime and correction insulin were given at that time. However, the next day patient's condition was worsening and they called EMS and his sugar was found to be in the thousands. She expresses concern that he cannot be care for adequately at home any longer and wishes to pursue skilled nursing facility.    Regarding Diabetes Mellitus:    Recurring episodes of DKA  Surgical Procedure and Date: NA  Diabetes diagnosis year: >20 years  Home Diabetes Medications:  During recent SNF was on Aspart 8 TID and glargine 8 units daily with sliding scale  How often checking glucose at home? >4 x day   Diabetes Complications include: Hyperglycemia, Hypoglycemia , and Diabetic peripheral neuropathy   Complicating diabetes co morbidities: History of CVA      Interval HPI:   Glucose trend-will above  "goal  this morning  No hypoglycemia   Overnight events: none  Eatin%  Nausea: No  Hypoglycemia and intervention: No  Fever: No  TPN and/or TF: No  If yes, type of TF/TPN and rate: na    BP (!) 104/55 (BP Location: Left arm, Patient Position: Lying)   Pulse 64   Temp 98.1 °F (36.7 °C) (Oral)   Resp 17   Ht 5' 11" (1.803 m)   Wt 81.6 kg (179 lb 14.3 oz)   SpO2 95%   BMI 25.09 kg/m²     Labs Reviewed and Include    Recent Labs   Lab 22  0407   *   CALCIUM 8.3*   ALBUMIN 2.4*   PROT 5.5*      K 4.5   CO2 26      BUN 21   CREATININE 0.9   ALKPHOS 103   ALT 36   AST 52*   BILITOT 0.5     Lab Results   Component Value Date    WBC 7.29 2022    HGB 11.4 (L) 2022    HCT 34.4 (L) 2022    MCV 88 2022     2022     No results for input(s): TSH, FREET4 in the last 168 hours.  Lab Results   Component Value Date    HGBA1C 9.7 (H) 2022       Nutritional status:   Body mass index is 25.09 kg/m².  Lab Results   Component Value Date    ALBUMIN 2.4 (L) 2022    ALBUMIN 2.3 (L) 2022    ALBUMIN 2.5 (L) 2022     No results found for: PREALBUMIN    Estimated Creatinine Clearance: 72 mL/min (based on SCr of 0.9 mg/dL).    Accu-Checks  Recent Labs     22  1216 22  1545 22  2039 22  0439 22  0738 22  1121 22  1603 22  0515 22  0731   POCTGLUCOSE 123* 142* 141* 258* 321* 321* 293* 90 287* 318*       Current Medications and/or Treatments Impacting Glycemic Control  Immunotherapy:    Immunosuppressants       None          Steroids:   Hormones (From admission, onward)                Start     Stop Route Frequency Ordered    22 1047  melatonin tablet 6 mg  (Medication Panel)         -- Oral Nightly PRN 22 0982          Pressors:    Autonomic Drugs (From admission, onward)                None          Hyperglycemia/Diabetes Medications:   Antihyperglycemics (From " admission, onward)                Start     Stop Route Frequency Ordered    22 0931  insulin aspart U-100 pen 1-10 Units         -- SubQ Before meals & nightly PRN 22 0832    22 0900  insulin detemir U-100 pen 7 Units         -- SubQ 2 times daily 22 0832    22 0845  insulin aspart U-100 pen 10-15 Units         -- SubQ 3 times daily with meals 22 0832    22 0111  insulin aspart U-100 pen 4 Units         -- SubQ With snacks 22 0012            ASSESSMENT and PLAN    Ketosis-prone diabetes mellitus  Key History and Diagnostic Findings  -  Admit for recurrent DKA  - A1c of 9.7 on 2022 from 11.5 on 2022  - Home Regimen: Levemir 8 units daily, aspart 8 units TIDWM  - Weight based dosin kg x 0.5 = 41 TDD x 0.5 = 20 basal / 20 prandial  - 1700/TDD = 41 (estimated insulin sensitivity factor)  - 450/TDD = 11 (estimated starting carb ratio for prandial dosing)  - Glucose Goals: 160-200mg/dL  - 24 hour glucose trend:   above goal  -  Variable diet    Plan  -  Increase Levemir to 8 units BID  -  Increase Aspart to 10-14 units TIDWM   -  Moderate correction scale    -  Hypoglycemia protocol in place  -  If blood glucose greater than 300, please ask patient not to eat food or drink anything other than water until correctional insulin has brought it back below 250  -  Please ensure patient receives their p.r.n. insulin aspart if they eat a snack with more than 30 g of carbohydrate  -  Daughter wishes to pursue skilled nursing facility for discharge as she states he cannot be adequately taken care of at home    Dyslipidemia associated with type 2 diabetes mellitus  - on atorvastatin 40 mg daily  - last lipid profile from 2022 shows LDL at 89, above goal of 70  - ensure compliance and repeat lipid profile outpatient in a month, if it continues to be above goal, would consider adding medication to therapy.      Coronary artery disease  - Strive to  optimize glucose control        Stewart Rashid MD  Endocrinology  [facility] - Tele[facility] Stepdown

## 2022-04-25 NOTE — PLAN OF CARE
SW spoke to pt's daughter, Basia [phone], in reference to discharge plan.  Pt's daughter advised SW pt and family agreed to [facility] but pt was not agreeable with his pension going to NH.  Therefore, the decision was made for pt to go home with HH.  In addition to private sitter at home, SW will provide pt's daughter with information for [facility]).      SW spoke to pt and confirmed the above information.  Pt advised SW he is not going to a nursing home and not giving them his pension check.  Medical team notified of the above information.      SW will sent referrals to [facility], [facility], and Outpatient Case [facility].      Oliva Lorenzo LMSW  PRN-  [facility]  Ext. [phone]

## 2022-04-25 NOTE — PROGRESS NOTES
[facility] Medicine  Telemedicine Progress Note    Patient Name: Kamar Muñoz  MRN: [id number]  Patient Class: IP- Inpatient   Admission Date: 4/11/2022  Length of Stay: 13 days  Attending Physician: Komal Huynh MD  Primary Care Provider: Basim Guerrero MD          Subjective:     Principal Problem:Pulmonary cavitary lesion        HPI:  Mr. Kamar Muñoz is a 76 y.o. male with a past medical history of HTN, Type 2 DM, CAD, STEMI, HLD, paroxysmal Afib, CVA, seizures and recurrent DKA.  He presented to [facility] [place] on 4/11 with elevated blood glucose.  He was discharged from [facility] on 4/10 after being admitted for DKA on 4/5.  At time of exam patient is alert but altered and unable to provide any history.  Spoke with patient's daughter who states she is a primary caregiver at home and obtained history as well as review of chart.  Per family he was not feeling well after discharge to home and vomited several times on 4/11. Unknown characteristics of emesis. Finger stick at home read High with unreadable blood glucose.  At this time daughter gave him 14 units of insulin and sublingual zofran. Other than malaise and nausea patient was not exhibiting any other signs/symptoms at home.  In ER BG found to be 1015 with pCO2 6 and anion gap 35.  Hyperkalemic with K 7.0 and some EKG changes when compared to previous EKG.  Given calcium gluconate, and started on insulin gtt as well as subQ long acting insulin.      Critical Care Medicine consulted for DKA and admitted to MICU.        Overview/Hospital Course:  Admitted to MICU on 4/11 for DKA with BG >1000, pCO2 6, AG 35, and hyperkalemia.  Given Calcium gluconate and started on insulin gtt in ED.  Hyperkalemia not improved on repeat labs and bolused with IV insulin.  Infectious workup sent and broad spectrum abx started.  4/12 potassium improving with insulin. 4/13 Gap closed, insulin gtt turned off and switched to subq insulin.  Endocrine consulted for insulin mgmt. He was stepped down to  4/14.    Since step down stable. Advanced diet. Endocrine following and titrating insulin. Poor po intake making it difficult to adjust insulin. CT chest with cavitary lesion, pulmonary and ID consulted. SNF once medically stable for dc.        This encounter was provided through telemedicine.  Patient was transferred to the telemedicine service on:  04/22/2022   [facility] 4/11/2022  8:55 PM.  Present with the patient at the time of the telemed/virtual assessment: Telepresenter    Interval History/Overnight Events:     C/o nausea, no vomiting - chronic issue that occurs when he eats acidic food and has associated burning- ate dirty rice last night; has noted frequent urination with incontinence which is unchanged from previous; does eat more carbs at home with expected increase in glucoses if he discharges to home    -he continues to say he is going home although his family states he has alternatives    Review of Systems   Constitutional:  Positive for activity change and fatigue.   Respiratory:  Negative for cough.    Cardiovascular:  Negative for leg swelling.   Gastrointestinal:  Negative for diarrhea and vomiting.   Endocrine: Positive for polyuria.   Genitourinary:  Negative for dysuria.   Musculoskeletal:  Positive for myalgias (buttock pain making it difficulty to sit).   Neurological:  Positive for weakness.   Psychiatric/Behavioral:  Negative for behavioral problems and confusion.       Inpatient Medications:  Scheduled Meds:   amiodarone  200 mg Oral Daily    amoxicillin-clavulanate 875-125mg  1 tablet Oral Q12H    apixaban  5 mg Oral BID    atorvastatin  40 mg Oral Daily    clopidogreL  75 mg Oral Daily    insulin aspart U-100  8-14 Units Subcutaneous TIDWM    insulin detemir U-100  6 Units Subcutaneous BID    lacosamide  100 mg Oral Q12H    metoprolol succinate  50 mg Oral Daily    pantoprazole   40 mg Oral Daily    polyethylene glycol  17 g Oral Daily    senna-docusate 8.6-50 mg  1 tablet Oral BID    tamsulosin  0.4 mg Oral Daily     Continuous Infusions:  PRN Meds:.acetaminophen, calcium carbonate, cloNIDine, dextrose 10%, diphenhydrAMINE, glucagon (human recombinant), hydrALAZINE, insulin aspart U-100, insulin aspart U-100, melatonin, ondansetron, prochlorperazine, senna, simethicone, sodium chloride 0.9%      Objective:     Temp:  [96.6 °F (35.9 °C)-98.7 °F (37.1 °C)] 97.7 °F (36.5 °C)  Pulse:  [56-63] 63  Resp:  [16-20] 17  SpO2:  [89 %-96 %] 94 %  BP: ()/(53-67) 125/67      Intake/Output Summary (Last 24 hours) at 4/24/2022 0911  Last data filed at 4/23/2022 1715  Gross per 24 hour   Intake 480 ml   Output --   Net 480 ml          Body mass index is 25.09 kg/m².    Physical Exam  Vitals and nursing note reviewed.   Constitutional:       General: He is not in acute distress.     Appearance: He is normal weight. He is ill-appearing.   HENT:      Head: Normocephalic and atraumatic.      Right Ear: Hearing normal.      Left Ear: Hearing normal.      Nose: Nose normal.   Eyes:      General: No scleral icterus.        Right eye: No discharge.         Left eye: No discharge.      Extraocular Movements: Extraocular movements intact.   Cardiovascular:      Rate and Rhythm: Normal rate.   Pulmonary:      Effort: Pulmonary effort is normal. No accessory muscle usage or respiratory distress.   Skin:     Findings: No rash.   Neurological:      General: No focal deficit present.      Mental Status: He is alert and oriented to person, place, and time.      Cranial Nerves: No cranial nerve deficit.      Motor: No weakness.   Psychiatric:         Attention and Perception: Attention normal.         Mood and Affect: Affect is flat.         Speech: Speech normal.         Behavior: Behavior is cooperative.        Labs:  Recent Results (from the past 24 hour(s))   POCT glucose    Collection Time: 04/23/22 12:16 PM    Result Value Ref Range    POCT Glucose 123 (H) 70 - 110 mg/dL   POCT glucose    Collection Time: 04/23/22  3:45 PM   Result Value Ref Range    POCT Glucose 142 (H) 70 - 110 mg/dL   POCT glucose    Collection Time: 04/23/22  8:39 PM   Result Value Ref Range    POCT Glucose 141 (H) 70 - 110 mg/dL   CBC Auto Differential    Collection Time: 04/24/22  4:17 AM   Result Value Ref Range    WBC 7.25 3.90 - 12.70 K/uL    RBC 3.85 (L) 4.60 - 6.20 M/uL    Hemoglobin 11.4 (L) 14.0 - 18.0 g/dL    Hematocrit 34.4 (L) 40.0 - 54.0 %    MCV 89 82 - 98 fL    MCH 29.6 27.0 - 31.0 pg    MCHC 33.1 32.0 - 36.0 g/dL    RDW 16.0 (H) 11.5 - 14.5 %    Platelets 230 150 - 450 K/uL    MPV 9.5 9.2 - 12.9 fL    Immature Granulocytes 0.3 0.0 - 0.5 %    Gran # (ANC) 4.5 1.8 - 7.7 K/uL    Immature Grans (Abs) 0.02 0.00 - 0.04 K/uL    Lymph # 1.8 1.0 - 4.8 K/uL    Mono # 0.5 0.3 - 1.0 K/uL    Eos # 0.4 0.0 - 0.5 K/uL    Baso # 0.05 0.00 - 0.20 K/uL    nRBC 0 0 /100 WBC    Gran % 62.1 38.0 - 73.0 %    Lymph % 25.1 18.0 - 48.0 %    Mono % 6.8 4.0 - 15.0 %    Eosinophil % 5.0 0.0 - 8.0 %    Basophil % 0.7 0.0 - 1.9 %    Differential Method Automated    Comprehensive Metabolic Panel    Collection Time: 04/24/22  4:17 AM   Result Value Ref Range    Sodium 135 (L) 136 - 145 mmol/L    Potassium 4.5 3.5 - 5.1 mmol/L    Chloride 102 95 - 110 mmol/L    CO2 27 23 - 29 mmol/L    Glucose 259 (H) 70 - 110 mg/dL    BUN 16 8 - 23 mg/dL    Creatinine 0.9 0.5 - 1.4 mg/dL    Calcium 8.2 (L) 8.7 - 10.5 mg/dL    Total Protein 5.7 (L) 6.0 - 8.4 g/dL    Albumin 2.3 (L) 3.5 - 5.2 g/dL    Total Bilirubin 0.5 0.1 - 1.0 mg/dL    Alkaline Phosphatase 105 55 - 135 U/L    AST 33 10 - 40 U/L    ALT 32 10 - 44 U/L    Anion Gap 6 (L) 8 - 16 mmol/L    eGFR if African American >60.0 >60 mL/min/1.73 m^2    eGFR if non African American >60.0 >60 mL/min/1.73 m^2   Magnesium    Collection Time: 04/24/22  4:17 AM   Result Value Ref Range    Magnesium 1.4 (L) 1.6 - 2.6 mg/dL   Phosphorus     Collection Time: 04/24/22  4:17 AM   Result Value Ref Range    Phosphorus 3.1 2.7 - 4.5 mg/dL   POCT glucose    Collection Time: 04/24/22  4:39 AM   Result Value Ref Range    POCT Glucose 258 (H) 70 - 110 mg/dL   POCT glucose    Collection Time: 04/24/22  7:38 AM   Result Value Ref Range    POCT Glucose 321 (H) 70 - 110 mg/dL        Lab Results   Component Value Date    [id number] Negative 04/05/2022       Recent Labs   Lab 04/22/22 0238 04/23/22  0303 04/24/22  0417   WBC 6.04 5.53 7.25   LYMPH 27.6  1.7 26.0  1.4 25.1  1.8   HGB 11.7* 11.7* 11.4*   HCT 34.8* 35.1* 34.4*    225 230       Recent Labs   Lab 04/22/22 0238 04/23/22  0303 04/24/22  0417    138 135*   K 3.8 4.1 4.5    103 102   CO2 25 25 27   BUN 14 15 16   CREATININE 0.8 0.8 0.9   GLU 86 138* 259*   CALCIUM 8.3* 8.1* 8.2*   MG 1.6 1.6 1.4*   PHOS 2.9 3.2 3.1       Recent Labs   Lab 04/22/22 0238 04/23/22  0303 04/24/22  0417   ALKPHOS 109 111 105   ALT 35 38 32   AST 62* 56* 33   ALBUMIN 2.4* 2.5* 2.3*   PROT 5.7* 5.6* 5.7*   BILITOT 0.4 0.4 0.5          No results for input(s): DDIMER, FERRITIN, CRP, LDH, BNP, TROPONINI, CPK in the last 72 hours.    Invalid input(s): PROCALCITONIN    All labs within the last 24 hours were reviewed.     Microbiology:  Microbiology Results (last 7 days)       Procedure Component Value Units Date/Time    Culture, Respiratory with Gram Stain [[id number]]     Order Status: No result Specimen: Respiratory     AFB Culture & Smear [[id number]]     Order Status: No result Specimen: Sputum               Imaging  ECG Results              EKG 12-lead (Final result)  Result time 04/12/22 12:51:09      Final result by Interface, Lab In [facility] (04/12/22 12:51:09)                   Narrative:    Test Reason : R73.9,    Vent. Rate : 118 BPM     Atrial Rate : 111 BPM     P-R Int : 000 ms          QRS Dur : 162 ms      QT Int : 436 ms       P-R-T Axes : 000 -14 052 degrees     QTc Int : 611 ms    Wide QRS  rhythm  Right bundle branch block  ST elevation anterior leads differential includes anterior STEMI vs,  repolarization abnormality  Abnormal ECG  When compared with ECG of 05-APR-2022 11:41,  Wide QRS rhythm has replaced Sinus rhythm  Vent. rate has increased BY  48 BPM  Confirmed by Megha Stock MD ([id number]) on 4/12/2022 12:51:00 PM    Referred By: GALLITOERR   SELF           Confirmed By:Megha Stock MD                                    Results for orders placed during the hospital encounter of 01/25/22    Echo    Interpretation Summary  · There is abnormal septal wall motion.  · The left ventricle is normal in size with low normal systolic function.  · The estimated ejection fraction is 50%.  · Normal left ventricular diastolic function.  · Mild left atrial enlargement.  · Normal right ventricular size with normal right ventricular systolic function.  · Mild mitral regurgitation.  · There are segmental left ventricular wall motion abnormalities.  · Mild tricuspid regurgitation.  · Elevated central venous pressure (15 mmHg).      CT Chest Without Contrast  Narrative: EXAMINATION:  CT CHEST WITHOUT CONTRAST    CLINICAL HISTORY:  pulmonary nodule, follow up;    TECHNIQUE:  Low dose axial images, sagittal and coronal reformations were obtained from the thoracic inlet to the lung bases. Contrast was not administered.    COMPARISON:  Chest x-ray 04/11/2022, 03/05/2022; CT chest 12/01/2021, 09/23/2021, 04/14/2021.    FINDINGS:  SOFT TISSUES:  Unremarkable.    HEART AND MEDIASTINUM:  Several slightly prominent mediastinal lymph nodes, including 1.6 cm level 4R right lower paratracheal node (axial series 2, image 51), grossly stable compared to prior.  Multi-vessel coronary arterial calcific plaques ranging from mild to moderate-severe.  Scattered calcific plaques in the visualized aorta.    PLEURA:  Unremarkable.    LUNGS AND AIRWAYS:  Airways are patent.  Advanced bilateral centrilobular and paraseptal emphysema  with subpleural reticulations and bandlike opacities in the bilateral upper lobes with interval improvement in interlobular septal thickening.    There are new ground-glass opacities in the dependent portions of the right upper lobe and the right lower lobe, consider aspiration.    There is a new 7 mm right lower lobe posterior segment nodule (4-331)    There are several stable to mildly improved, bilateral solid pulmonary nodules with index lesions as follows:    *Evolving appearance of a right upper lobe posterior segment cavitary lesion with internal dependent mass; the cavity demonstrates a thinner wall and has decreased in diameter.  There is adjacent contracture of parenchyma.  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  The central debris is decreased in size,, now measuring 1.0 cm (axial series 4, image 81), previously 2.0 cm.  *Triangular 1.4 cm nodule in the posterior segment of the right upper lobe (axial series 4, image 181) previously 1.8 cm  *Stable appearance of previously described 1.0 cm solid nodule in the posterior segment of the right upper lobe (4-277, prior exam 6-290).  *Stable 12 mm right upper lobe triangular opacity (4-203, prior 6-183).  *Stable 0.9 cm solid nodule in the superior segment of the right lower lobe (axial series 4, image 37), previously 0.9 cm.  ESOPHAGUS:  Unremarkable.    UPPER ABDOMEN (limited):  Left renal cortical hypodensities, likely simple cysts.  Otherwise unremarkable.    BONES: Degenerative changes of the thoracic spine.  No fractures or focal osseous lesions.  Impression: 1. There are several stable pulmonary nodules as described above.  2. There are new ground-glass opacities in the dependent portions of the right lung, consider aspiration.  3. There is a new 7 mm right lower lobe posterior segment nodule.  This may reflect infectious/inflammatory etiology given other findings in the chest.  Clinical considerations will determine if  "further investigation of this finding is to be pursued.    Electronically signed by resident: Ernesto Lui  Date:    04/19/2022  Time:    08:25    Electronically signed by: Katt Rubi  Date:    04/19/2022  Time:    10:02      All imaging within the last 24 hours was reviewed.       Discharge Planning   ALLIE: 4/25/2022     Code Status: DNR   Is the patient medically ready for discharge?: No    Reason for patient still in hospital (select all that apply): Patient trending condition, Treatment, Consult recommendations, and Pending disposition  Discharge Plan A: Skilled Nursing Facility   Discharge Delays: None known at this time       Assessment/Plan:      * Pulmonary cavitary lesion  - As per CT, noted about a month ago  - Repeat CT Chest without contrast obtained on 4/19 revealed "an evolving appearance of a right upper lobe posterior segment cavitary lesion with internal dependent mass; the cavity demonstrates a thinner wall and has decreased in diameter.  There is adjacent contracture of parenchyma.  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  The central debris is decreased in size,, now measuring 1.0 cm (axial series 4, image 81), previously 2.0 cm, and various other nodules".   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  - 1/11/2022 Culture with MAC pending susceptibilities   - 1/11/2022 2nd AFB culture with pending results  - Differential includes MAC, aspergillosis, chronic aspiration and less likely malignancy   - Pulmonary rec Augmentin x 7 days   - Induced sputum and send for cultures ordered, and AFB - has not been able to supply sample  - ID consulted and recommend repeat  - Poor candidate for surgical intervention given he is on triple therapy (eliquis + DAPT) for both afib and recent STEMI with LAUREN stent placed in January  - suspect his weight loss and recent decline worsened by underlying infection  - Repeat Chest CT in 3 months " - 7/2022   - No indication for bronchoscopy at this time  - Can continue follow up with Dr. Louie outpatient.   - On RA and denies respiratory symptoms      Bacterial pneumonia  Complete 7 days of augmentin      Goals of care, counseling/discussion  - DNR as per ICU    Functional urinary incontinence  Remains requiring adult diapers  Post for residual ordered to assess for retention  Initiate time toileting for bladder training      Severe malnutrition  Nutrition consulted. Most recent weight and BMI monitored- Body mass index is 25.09 kg/m².       Malnutrition (Moderate to Severe)  Weight Loss (Malnutrition): greater than 7.5% in 3 months  Energy Intake (Malnutrition): less than 75% for greater than or equal to 1 month     -he is drinking more boost in eating food so will increased boost supplements to 2 servings with each tray.         Measurements:  Wt Readings from Last 1 Encounters:   04/19/22 81.6 kg (179 lb 14.3 oz)   Body mass index is 25.09 kg/m².    Recommendations: Recommendation/Intervention: 1. Continue current Diabetic diet + ONS. 2. RD to monitor & follow-up.  Goals: Meet % EEN, EPN by RD f/u date    Patient has been screened and assessed by RD. RD will follow patient.    Discharge planning issues  PT/OT recommends skilled nursing facility ongoing therapy; patient acknowledges and agrees with the need for ongoing therapy but unfortunately he is required to pay a co-pay for further SNF days.  He feels his only options to go home with limited sitter assistance and home health; his son has made arrangements for him to go to his preferred facility, [facility] but the patient feels he cannot manage the financial implications of going there; patient's son is committed to getting patient to [facility] which is likely his safest disposition      Ketosis-prone diabetes mellitus  - Interval history and physical exam findings as described above  - DKA now resolved  - Working to optimize BG  control  - Endocrine following and adjusting insulin regimen as needed  - SSI provided for corrective dosing  - DXTs as ordered  - Hypoglycemic protocol in effect  -Endocrine following due to erratic glucoses    Focal seizures  - History of seizures treated with lacosamide.    - Continue home lacosamide    Coronary artery disease  - Stable  - Continue home regimen of aspirin, atorvastatin, clopidogrel, losartan, and metoprolol      Paroxysmal atrial fibrillation  - History of afib.    - Continue home regimen of amiodarone, apixaban, and metoprolol        Essential hypertension  - resume home meds as tolerates        VTE Risk Mitigation (From admission, onward)         Ordered     apixaban tablet 5 mg  2 times daily         04/20/22 1209     IP VTE HIGH RISK PATIENT  Once         04/11/22 2342     Place sequential compression device  Until discontinued         04/11/22 2138                I have assessed these findings virtually using a telemed platform and with assistance of the bedside nurse.        The attending portion of this evaluation, treatment, and documentation was performed per Komal Huynh MD via Telemedicine AudioVisual using the secure [employer] software platform with 2 way audio/video. The provider was located off-site and the patient is located in the hospital. The aforementioned video software was utilized to document the relevant history and physical exam    Komal Huynh MD  Department of Hospital Medicine   [facility] - Telemetry Stepdown

## 2022-04-25 NOTE — PT/OT/SLP PROGRESS
Occupational Therapy      Patient Name:  Kamar Muñoz   MRN:  [id number]    Patient not seen today secondary to wanting to shave at [employer] privately and eat lunch before participating with OT. Pt educated on OT's role of ADL retraining. Pt verbalized understanding but stated he'd prefer to perform grooming independently. OT will reattempt later this pm if time permits.     4/25/2022

## 2022-04-26 PROBLEM — R11.0 NAUSEA: Status: ACTIVE | Noted: 2022-04-26

## 2022-04-26 LAB
POCT GLUCOSE: 169 MG/DL (ref 70–110)
POCT GLUCOSE: 198 MG/DL (ref 70–110)
POCT GLUCOSE: 204 MG/DL (ref 70–110)
POCT GLUCOSE: 206 MG/DL (ref 70–110)

## 2022-04-26 PROCEDURE — 99232 PR SUBSEQUENT HOSPITAL CARE,LEVL II: ICD-10-PCS | Mod: GC,,, | Performed by: INTERNAL MEDICINE

## 2022-04-26 PROCEDURE — 25000003 PHARM REV CODE 250: Performed by: INTERNAL MEDICINE

## 2022-04-26 PROCEDURE — 97116 GAIT TRAINING THERAPY: CPT

## 2022-04-26 PROCEDURE — 99232 SBSQ HOSP IP/OBS MODERATE 35: CPT | Mod: GC,,, | Performed by: INTERNAL MEDICINE

## 2022-04-26 PROCEDURE — 63600175 PHARM REV CODE 636 W HCPCS: Performed by: INTERNAL MEDICINE

## 2022-04-26 PROCEDURE — 97110 THERAPEUTIC EXERCISES: CPT

## 2022-04-26 PROCEDURE — 99232 SBSQ HOSP IP/OBS MODERATE 35: CPT | Mod: 95,,, | Performed by: INTERNAL MEDICINE

## 2022-04-26 PROCEDURE — 20600001 HC STEP DOWN PRIVATE ROOM

## 2022-04-26 PROCEDURE — 99232 PR SUBSEQUENT HOSPITAL CARE,LEVL II: ICD-10-PCS | Mod: 95,,, | Performed by: INTERNAL MEDICINE

## 2022-04-26 PROCEDURE — 97535 SELF CARE MNGMENT TRAINING: CPT

## 2022-04-26 PROCEDURE — 97530 THERAPEUTIC ACTIVITIES: CPT

## 2022-04-26 PROCEDURE — 94761 N-INVAS EAR/PLS OXIMETRY MLT: CPT

## 2022-04-26 PROCEDURE — 99900035 HC TECH TIME PER 15 MIN (STAT)

## 2022-04-26 RX ORDER — INSULIN ASPART 100 [IU]/ML
16 INJECTION, SOLUTION INTRAVENOUS; SUBCUTANEOUS
Status: DISCONTINUED | OUTPATIENT
Start: 2022-04-26 | End: 2022-04-27

## 2022-04-26 RX ORDER — ONDANSETRON 4 MG/1
4 TABLET, ORALLY DISINTEGRATING ORAL
Status: DISCONTINUED | OUTPATIENT
Start: 2022-04-26 | End: 2022-05-03 | Stop reason: HOSPADM

## 2022-04-26 RX ORDER — SUCRALFATE 1 G/10ML
1 SUSPENSION ORAL
Status: DISCONTINUED | OUTPATIENT
Start: 2022-04-26 | End: 2022-05-03 | Stop reason: HOSPADM

## 2022-04-26 RX ADMIN — APIXABAN 5 MG: 5 TABLET, FILM COATED ORAL at 09:04

## 2022-04-26 RX ADMIN — INSULIN ASPART 16 UNITS: 100 INJECTION, SOLUTION INTRAVENOUS; SUBCUTANEOUS at 04:04

## 2022-04-26 RX ADMIN — AMIODARONE HYDROCHLORIDE 200 MG: 200 TABLET ORAL at 08:04

## 2022-04-26 RX ADMIN — INSULIN ASPART 14 UNITS: 100 INJECTION, SOLUTION INTRAVENOUS; SUBCUTANEOUS at 08:04

## 2022-04-26 RX ADMIN — ONDANSETRON 4 MG: 4 TABLET, ORALLY DISINTEGRATING ORAL at 05:04

## 2022-04-26 RX ADMIN — METOPROLOL SUCCINATE 50 MG: 50 TABLET, EXTENDED RELEASE ORAL at 08:04

## 2022-04-26 RX ADMIN — INSULIN ASPART 4 UNITS: 100 INJECTION, SOLUTION INTRAVENOUS; SUBCUTANEOUS at 01:04

## 2022-04-26 RX ADMIN — SUCRALFATE 1 G: 1 SUSPENSION ORAL at 04:04

## 2022-04-26 RX ADMIN — SUCRALFATE 1 G: 1 SUSPENSION ORAL at 09:04

## 2022-04-26 RX ADMIN — CLOPIDOGREL 75 MG: 75 TABLET, FILM COATED ORAL at 08:04

## 2022-04-26 RX ADMIN — Medication 6 MG: at 08:04

## 2022-04-26 RX ADMIN — SIMETHICONE 80 MG: 80 TABLET, CHEWABLE ORAL at 08:04

## 2022-04-26 RX ADMIN — DOCUSATE SODIUM 50 MG: 50 CAPSULE, LIQUID FILLED ORAL at 09:04

## 2022-04-26 RX ADMIN — AMOXICILLIN AND CLAVULANATE POTASSIUM 1 TABLET: 875; 125 TABLET, FILM COATED ORAL at 08:04

## 2022-04-26 RX ADMIN — INSULIN ASPART 14 UNITS: 100 INJECTION, SOLUTION INTRAVENOUS; SUBCUTANEOUS at 01:04

## 2022-04-26 RX ADMIN — PANTOPRAZOLE SODIUM 40 MG: 40 TABLET, DELAYED RELEASE ORAL at 08:04

## 2022-04-26 RX ADMIN — AMOXICILLIN AND CLAVULANATE POTASSIUM 1 TABLET: 875; 125 TABLET, FILM COATED ORAL at 09:04

## 2022-04-26 RX ADMIN — INSULIN ASPART 4 UNITS: 100 INJECTION, SOLUTION INTRAVENOUS; SUBCUTANEOUS at 08:04

## 2022-04-26 RX ADMIN — LACOSAMIDE 100 MG: 100 TABLET, FILM COATED ORAL at 09:04

## 2022-04-26 RX ADMIN — SUCRALFATE 1 G: 1 TABLET ORAL at 08:04

## 2022-04-26 RX ADMIN — DIPHENHYDRAMINE HYDROCHLORIDE 25 MG: 25 CAPSULE ORAL at 08:04

## 2022-04-26 RX ADMIN — LACOSAMIDE 100 MG: 100 TABLET, FILM COATED ORAL at 08:04

## 2022-04-26 RX ADMIN — ATORVASTATIN CALCIUM 40 MG: 20 TABLET, FILM COATED ORAL at 08:04

## 2022-04-26 RX ADMIN — ONDANSETRON 8 MG: 2 INJECTION INTRAMUSCULAR; INTRAVENOUS at 01:04

## 2022-04-26 RX ADMIN — TAMSULOSIN HYDROCHLORIDE 0.4 MG: 0.4 CAPSULE ORAL at 08:04

## 2022-04-26 NOTE — PT/OT/SLP PROGRESS
Physical Therapy Treatment    Patient Name:  Kamar Muñoz   MRN:  [id number]    Recommendations:     Discharge Recommendations:  nursing facility, skilled   Discharge Equipment Recommendations: other (see comments) (tbd)   Barriers to discharge: Decreased caregiver support    Assessment:     Kamar Muñoz is a 76 y.o. male admitted with a medical diagnosis of Pulmonary cavitary lesion.  He presents with the following impairments/functional limitations:  weakness, impaired endurance, impaired self care skills, impaired functional mobilty, gait instability, impaired balance, decreased safety awareness, impaired cardiopulmonary response to activity. Kamar Muñoz would benefit from acute PT intervention to address listed functional deficits, provide patient/caregiver education, reduce fall risk, and maximize (I) and safety with functional mobility.    Pt participated well with PT on this date. Pt found sitting EOB and agreeable to ambulation in ding. Pt able to walk ~100 ft with RW and CGA with 2 instances of L knee buckling requiring Liz to correct. Pt continues to be a fall risk. Pt will continue to benefit from acute PT services in order to maximize safety and (I) with functional mobility.    After hospital discharge, pt would benefit from SNF to continue addressing therapy impairments.    Rehab Prognosis: Good; patient would benefit from acute skilled PT services to address these deficits and reach maximum level of function.      Plan:     During this hospitalization, patient to be seen 4 x/week to address the identified rehab impairments via gait training, therapeutic activities, therapeutic exercises, neuromuscular re-education and progress toward the following goals:    · Plan of Care Expires:  05/04/22    This plan of care has been discussed with the patient/caregiver, who was included in its development and is in agreement with the identified goals and treatment plan.     Subjective      Communicated with RN prior to session.  Patient agreeable to participate.     Chief Complaint: weakness  Patient/Family Comments/goals: become strong enough to be independent  Pt pain prior to session: 0/10  Pt pain following session: 0/10    Objective:     Patient found sitting edge of bed with telemetry  upon PT entry to room.    General Precautions: Standard, diabetic, fall   Orthopedic Precautions:N/A   Braces: N/A     Functional Mobility:    Bed Mobility:  · Sit to Supine: Stand-by Assistance  · Scooting/Bridging in supine to HOB: Contact Guard Assistance    Transfers:   · Sit to Stand Transfer: Contact Guard Assistance  from EOB with RW AD             Gait:  · Patient received gait training ~100 feet with Contact Guard Assistance and Minimal Assistance and rolling walker  · Gait Assessment: decreased speed, step length, swing through, heel strike, forward flexed posture, and downward gaze.   · PT providing verbal and tactile cues for improved upright posture, gaze direction, AD management, and gait fluidity.   · Pt with 2 instances of minimal L knee buckling requiring Liz to correct; improved with increased insight into deficit    Balance:  · Static Sit: Supervision at EOB x ~10 minutes  · Static Stand: Contact-Guard Assist with Rolling walker      Therapeutic Activities/Exercises     PT providing education on importance of nutrition and caloric intake to improve strength.    Patient educated on the importance of early mobility to prevent functional decline during hospital stay    Patient was instructed to utilize staff assistance for mobility/transfers.  Patient was educated on PT POC and all questions answered within PT scope of practice.    Patient able to verbalize understanding; will follow-up with pt during current admit for additional questions/concerns within scope of practice.     AM-PAC 6 CLICK MOBILITY  Total Score:17     Patient left HOB elevated with all lines intact, call button in reach and  PCT present.        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    Hyperlipidemia     Hypertension     Kidney stone     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 8/2/2012    STEMI involving right coronary artery 1/9/2022    Type II or unspecified type diabetes mellitus with neurological manifestations, uncontrolled(250.62) 3/8/2013    Urinary tract infection        Past Surgical History:   Procedure Laterality Date    BACK SURGERY      CATARACT EXTRACTION W/  INTRAOCULAR LENS IMPLANT Right     Per Dr Romero note 11/2018    COLONOSCOPY N/A 1/28/2019    Procedure: COLONOSCOPY Suprep;  Surgeon: Anh Johnson MD;  Location: [facility] ENDO;  Service: Endoscopy;  Laterality: N/A;    EYE SURGERY      HERNIA REPAIR      LEFT HEART CATHETERIZATION Left 1/9/2022    Procedure: CATHETERIZATION, HEART, LEFT;  Surgeon: Will Hurst III, MD;  Location: [facility] CATH LAB/EP;  Service: Cardiology;  Laterality: Left;    renal stones      SHOULDER OPEN ROTATOR CUFF REPAIR         GOALS:   Multidisciplinary Problems     Physical Therapy Goals        Problem: Physical Therapy    Goal Priority Disciplines Outcome Goal Variances Interventions   Physical Therapy Goal     PT, PT/OT Ongoing, Progressing     Description: Goals to met by 4/29/2022     1. Supine to sit with Modified [place]  2. Sit to supine with Modified [place]  3. Sit to stand transfer with Modified [place]  4. Bed to chair transfer with Modified [place] using Rolling Walker  5. Gait  x 150 feet with Stand-by Assistance using Rolling Walker   6. Ascend/Descend 6 inch curb step with Contact Guard Assistance using LRAD.  7. Lower extremity exercise program x15 reps per Instruction, with supervision in order to facilitate improvement in  functional independence                     Time Tracking:     PT Received On: 04/26/22  PT Start Time: 1505     PT Stop Time: 1529  PT Total Time (min): 24 min     Billable Minutes: Gait Training 15 and Therapeutic Activity 9      Hiram Stevenson, JOSH  04/26/2022

## 2022-04-26 NOTE — PLAN OF CARE
Problem: Pain Acute  Goal: Acceptable Pain Control and Functional Ability  Outcome: Ongoing, Progressing       Pt sitting on the side of bed eating dinner, AOX4, able to voice needs, denies pain, no distress noted, incontinence of bladder, continent of bowel, VS and BGL monitored, educated on POC and BGL, verbalizes understanding, bed locked in lowest position, call bell in reach, instructed to call when assistance needed.

## 2022-04-26 NOTE — PROGRESS NOTES
[facility] - Telemetry Stepdown  Adult Nutrition  Consult Note    SUMMARY     Recommendations    1. Continue current Diabetic diet + ONS.   2. RD to monitor & follow-up.    Goals: Meet % EEN, EPN by RD f/u date  Nutrition Goal Status: goal met  Communication of RD Recs: reviewed with RN    Assessment and Plan    Severe malnutrition    Nutrition Problem:  Severe Protein-Calorie Malnutrition  Malnutrition in the context of Chronic Illness/Injury     Related to (etiology):  Inability to consume sufficient energy      Signs and Symptoms (as evidenced by):  Energy Intake: <75% of estimated energy requirement for 1-2 months  Body Fat Depletion: moderate and severe depletion of orbitals and triceps   Muscle Mass Depletion: moderate and severe depletion of temples, clavicle region and lower extremities   Weight Loss: 18% x 3-4 months     Interventions(treatment strategy):  Collaboration of nutrition care w/ other providers  ONS     Nutrition Diagnosis Status:  Continues    Malnutrition Assessment    Weight Loss (Malnutrition): greater than 7.5% in 3 months  Energy Intake (Malnutrition): less than 75% for greater than or equal to 1 month   Orbital Region (Subcutaneous Fat Loss): moderate depletion  Upper Arm Region (Subcutaneous Fat Loss): severe depletion   Evangelical Region (Muscle Loss): moderate depletion  Clavicle Bone Region (Muscle Loss): severe depletion  Dorsal Hand (Muscle Loss): moderate depletion  Anterior Thigh Region (Muscle Loss): severe depletion     Reason for Assessment    Reason For Assessment: RD follow-up  Diagnosis: other (see comments) (DKA)  Relevant Medical History: HTN, DM  Interdisciplinary Rounds: did not attend    General Information Comments: Pt sleeping soundly at time of visit this AM. Per RN documentation, pt tolerating diet w/ improving PO intake; ONS also ordered. Information obtained from previous RD assessment 4/6: PTA - pt reports decreased appetite x 1-2 months & weight loss  "x 3-4 months; chart review confirms weight loss (weighed 200# x 2022). Pt meets criteria for severe malnutrition. Please see PES statement for details. Diabetic diet education complete .   Nutrition Discharge Planning: Adequate PO intake    Nutrition/Diet History    Spiritual, Cultural Beliefs, Muslim Practices, Values that Affect Care: no  Factors Affecting Nutritional Intake: None identified at this time    Anthropometrics    Temp: 97.8 °F (36.6 °C)  Height Method: Stated  Height: 5' 11" (180.3 cm)  Height (inches): 71 in  Weight: 81.6 kg (179 lb 14.3 oz)  Weight (lb): 179.9 lb  Ideal Body Weight (IBW), Male: 172 lb  % Ideal Body Weight, Male (lb): 104.59 %  BMI (Calculated): 25.1  BMI Grade: 25 - 29.9 - overweight  Usual Body Weight (UBW), k kg  % Usual Body Weight: 89.86  % Weight Change From Usual Weight: -10.33 %    Lab/Procedures/Meds    Pertinent Labs Reviewed: reviewed  Pertinent Labs Comments: A1C 9.7  Pertinent Medications Reviewed: reviewed    Estimated/Assessed Needs    Weight Used For Calorie Calculations: 81.6 kg (179 lb 14.3 oz)     Energy Calorie Requirements (kcal): 1948 kcal/d  Energy Need Method: Douds-St Jeor (1.25 PAL)     Protein Requirements: 98 g/d (1.2 g/kg)  Weight Used For Protein Calculations: 81.6 kg (179 lb 14.3 oz)     Estimated Fluid Requirement Method: other (see comments) (Per MD or 1 mL/kcal)  RDA Method (mL):     Nutrition Prescription Ordered    Current Diet Order: 1500 kcal ADA  Oral Nutrition Supplement: Boost Glucose Control ONS    Evaluation of Received Nutrient/Fluid Intake    I/O: +4.3L since     Comments: LBM:     Tolerance: tolerating    Nutrition Risk    Level of Risk/Frequency of Follow-up: (1x/week)     Monitor and Evaluation    Food and Nutrient Intake: energy intake, food and beverage intake  Food and Nutrient Adminstration: diet order  Physical Activity and Function: nutrition-related ADLs and IADLs  Anthropometric Measurements: weight, " weight change  Biochemical Data, Medical Tests and Procedures: inflammatory profile, lipid profile, glucose/endocrine profile, gastrointestinal profile, electrolyte and renal panel  Nutrition-Focused Physical Findings: overall appearance     Nutrition Follow-Up    RD Follow-up?: Yes

## 2022-04-26 NOTE — PLAN OF CARE
SW spoke to pt's son, Kamar [phone], regarding discharge plan.  Pt's son advised pt adamant about going home and not giving the NH his monthly pension.  Pt's son stated pt will probably return to hospital shortly after his discharge because he can not take care of himself at home and will be a fall risk.  Pt's son stated pt is set in his ways and when he does not want to do something he will not.      SW discussed with pt's son resources and services available at discharge. Pt accepted by [facility].  Referrals completed for [facility], [facility] and Outpatient case management.  SW emailed pt's son (Kamar) and daughter (Basia) resources and contact information for [facility] and [facility].  In addition, educational information about  [facility] and Resource lists were emailed to both Basia and Kamar for review.   SW suggested placing camera throughout the home to help monitor home activities and falls.  Pt's son stated he will speak with his sibling and parents to see about installing cameras for purpose of monitoring.  Family already has home sitters in the home but will need to increase the number of hours.       SW will continue to follow and assist with d/c needs.      Oliva Lorenzo LMSW  PRN-  [facility]  Ext. [phone]

## 2022-04-26 NOTE — SUBJECTIVE & OBJECTIVE
This encounter was provided through telemedicine.  Patient was transferred to the telemedicine service on:  04/22/2022   The patient location is: [id number] A admitted 4/11/2022  8:55 PM.  Present with the patient at the time of the telemed/virtual assessment: Telepresenter    Interval History/Overnight Events:     Unchanged nausea from yesterday with burning associated with food but occasionally prior to eating; unable to take reglan due to seizure hx; trial of zofran with meals; remains with poor po intake due to disliking the food preferences    -he will be going home with home health at discharge    Review of Systems   Constitutional:  Positive for activity change and fatigue.   Respiratory:  Negative for cough.    Cardiovascular:  Negative for leg swelling.   Gastrointestinal:  Negative for diarrhea and vomiting.   Endocrine: Positive for polyuria.   Genitourinary:  Negative for dysuria.   Musculoskeletal:  Positive for myalgias (buttock pain making it difficulty to sit).   Neurological:  Positive for weakness.   Psychiatric/Behavioral:  Negative for behavioral problems and confusion.       Inpatient Medications:  Scheduled Meds:   amiodarone  200 mg Oral Daily    amoxicillin-clavulanate 875-125mg  1 tablet Oral Q12H    apixaban  5 mg Oral BID    atorvastatin  40 mg Oral Daily    clopidogreL  75 mg Oral Daily    docusate sodium  50 mg Oral BID    insulin aspart U-100  14 Units Subcutaneous TIDWM    insulin detemir U-100  8 Units Subcutaneous BID    lacosamide  100 mg Oral Q12H    metoprolol succinate  50 mg Oral Daily    pantoprazole  40 mg Oral Daily    polyethylene glycol  17 g Oral Daily    senna-docusate 8.6-50 mg  1 tablet Oral BID    sucralfate  1 g Oral QID (AC & HS)    tamsulosin  0.4 mg Oral Daily     Continuous Infusions:  PRN Meds:.acetaminophen, calcium carbonate, cloNIDine, dextrose 10%, diphenhydrAMINE, glucagon (human recombinant), hydrALAZINE, insulin aspart U-100, insulin aspart U-100,  melatonin, ondansetron, prochlorperazine, senna, simethicone, sodium chloride 0.9%      Objective:     Temp:  [97.6 °F (36.4 °C)-98.2 °F (36.8 °C)] 97.6 °F (36.4 °C)  Pulse:  [62-86] 64  Resp:  [16-18] 16  SpO2:  [95 %-99 %] 96 %  BP: (102-137)/(52-80) 117/64      Intake/Output Summary (Last 24 hours) at 4/26/2022 1101  Last data filed at 4/26/2022 0745  Gross per 24 hour   Intake 720 ml   Output --   Net 720 ml          Body mass index is 25.09 kg/m².    Physical Exam  Vitals and nursing note reviewed.   Constitutional:       General: He is not in acute distress.     Appearance: He is normal weight. He is ill-appearing.   HENT:      Head: Normocephalic and atraumatic.      Right Ear: Hearing normal.      Left Ear: Hearing normal.      Nose: Nose normal.   Eyes:      General: No scleral icterus.        Right eye: No discharge.         Left eye: No discharge.      Extraocular Movements: Extraocular movements intact.   Cardiovascular:      Rate and Rhythm: Normal rate.   Pulmonary:      Effort: Pulmonary effort is normal. No accessory muscle usage or respiratory distress.   Skin:     Findings: No rash.   Neurological:      General: No focal deficit present.      Mental Status: He is alert and oriented to person, place, and time.      Cranial Nerves: No cranial nerve deficit.      Motor: No weakness.   Psychiatric:         Attention and Perception: Attention normal.         Mood and Affect: Affect is flat.         Speech: Speech normal.         Behavior: Behavior is cooperative.        Labs:  Recent Results (from the past 24 hour(s))   POCT glucose    Collection Time: 04/25/22 11:17 AM   Result Value Ref Range    POCT Glucose 439 (H) 70 - 110 mg/dL   POCT glucose    Collection Time: 04/25/22  2:25 PM   Result Value Ref Range    POCT Glucose 330 (H) 70 - 110 mg/dL   POCT glucose    Collection Time: 04/25/22  3:35 PM   Result Value Ref Range    POCT Glucose 338 (H) 70 - 110 mg/dL   POCT glucose    Collection Time:  04/25/22  9:59 PM   Result Value Ref Range    POCT Glucose 228 (H) 70 - 110 mg/dL   POCT glucose    Collection Time: 04/26/22  6:40 AM   Result Value Ref Range    POCT Glucose 198 (H) 70 - 110 mg/dL   POCT glucose    Collection Time: 04/26/22  7:21 AM   Result Value Ref Range    POCT Glucose 204 (H) 70 - 110 mg/dL        Lab Results   Component Value Date    [id number] Negative 04/05/2022       Recent Labs   Lab 04/23/22 0303 04/24/22 0417 04/25/22  0407   WBC 5.53 7.25 7.29   LYMPH 26.0  1.4 25.1  1.8 28.4  2.1   HGB 11.7* 11.4* 11.4*   HCT 35.1* 34.4* 34.4*    230 225       Recent Labs   Lab 04/23/22 0303 04/24/22 0417 04/25/22  0407    135* 136   K 4.1 4.5 4.5    102 101   CO2 25 27 26   BUN 15 16 21   CREATININE 0.8 0.9 0.9   * 259* 246*   CALCIUM 8.1* 8.2* 8.3*   MG 1.6 1.4* 1.5*   PHOS 3.2 3.1 2.9       Recent Labs   Lab 04/23/22 0303 04/24/22 0417 04/25/22  0407   ALKPHOS 111 105 103   ALT 38 32 36   AST 56* 33 52*   ALBUMIN 2.5* 2.3* 2.4*   PROT 5.6* 5.7* 5.5*   BILITOT 0.4 0.5 0.5          No results for input(s): DDIMER, FERRITIN, CRP, LDH, BNP, TROPONINI, CPK in the last 72 hours.    Invalid input(s): PROCALCITONIN    All labs within the last 24 hours were reviewed.     Microbiology:  Microbiology Results (last 7 days)       Procedure Component Value Units Date/Time    Urine culture [[id number]] Collected: 04/24/22 1219    Order Status: Completed Specimen: Urine Updated: 04/25/22 1247     Urine Culture, Routine Multiple organisms isolated. None in predominance.  Repeat if      clinically necessary.    Narrative:      Specimen Source->Urine    Culture, Respiratory with Gram Stain [[id number]]     Order Status: No result Specimen: Respiratory     AFB Culture & Smear [[id number]]     Order Status: No result Specimen: Sputum               Imaging  ECG Results              EKG 12-lead (Final result)  Result time 04/12/22 12:51:09      Final result by Interface, Lab In [facility]  (04/12/22 12:51:09)                   Narrative:    Test Reason : R73.9,    Vent. Rate : 118 BPM     Atrial Rate : 111 BPM     P-R Int : 000 ms          QRS Dur : 162 ms      QT Int : 436 ms       P-R-T Axes : 000 -14 052 degrees     QTc Int : 611 ms    Wide QRS rhythm  Right bundle branch block  ST elevation anterior leads differential includes anterior STEMI vs,  repolarization abnormality  Abnormal ECG  When compared with ECG of 05-APR-2022 11:41,  Wide QRS rhythm has replaced Sinus rhythm  Vent. rate has increased BY  48 BPM  Confirmed by Megha Stock MD ([id number]) on 4/12/2022 12:51:00 PM    Referred By: [email or web address]   SELF           Confirmed By:Megha Stock MD                                    Results for orders placed during the hospital encounter of 01/25/22    Echo    Interpretation Summary  · There is abnormal septal wall motion.  · The left ventricle is normal in size with low normal systolic function.  · The estimated ejection fraction is 50%.  · Normal left ventricular diastolic function.  · Mild left atrial enlargement.  · Normal right ventricular size with normal right ventricular systolic function.  · Mild mitral regurgitation.  · There are segmental left ventricular wall motion abnormalities.  · Mild tricuspid regurgitation.  · Elevated central venous pressure (15 mmHg).      CT Chest Without Contrast  Narrative: EXAMINATION:  CT CHEST WITHOUT CONTRAST    CLINICAL HISTORY:  pulmonary nodule, follow up;    TECHNIQUE:  Low dose axial images, sagittal and coronal reformations were obtained from the thoracic inlet to the lung bases. Contrast was not administered.    COMPARISON:  Chest x-ray 04/11/2022, 03/05/2022; CT chest 12/01/2021, 09/23/2021, 04/14/2021.    FINDINGS:  SOFT TISSUES:  Unremarkable.    HEART AND MEDIASTINUM:  Several slightly prominent mediastinal lymph nodes, including 1.6 cm level 4R right lower paratracheal node (axial series 2, image 51), grossly stable compared to prior.   Multi-vessel coronary arterial calcific plaques ranging from mild to moderate-severe.  Scattered calcific plaques in the visualized aorta.    PLEURA:  Unremarkable.    LUNGS AND AIRWAYS:  Airways are patent.  Advanced bilateral centrilobular and paraseptal emphysema with subpleural reticulations and bandlike opacities in the bilateral upper lobes with interval improvement in interlobular septal thickening.    There are new ground-glass opacities in the dependent portions of the right upper lobe and the right lower lobe, consider aspiration.    There is a new 7 mm right lower lobe posterior segment nodule (4-331)    There are several stable to mildly improved, bilateral solid pulmonary nodules with index lesions as follows:    *Evolving appearance of a right upper lobe posterior segment cavitary lesion with internal dependent mass; the cavity demonstrates a thinner wall and has decreased in diameter.  There is adjacent contracture of parenchyma.  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  The central debris is decreased in size,, now measuring 1.0 cm (axial series 4, image 81), previously 2.0 cm.  *Triangular 1.4 cm nodule in the posterior segment of the right upper lobe (axial series 4, image 181) previously 1.8 cm  *Stable appearance of previously described 1.0 cm solid nodule in the posterior segment of the right upper lobe (4-277, prior exam 6-290).  *Stable 12 mm right upper lobe triangular opacity (4-203, prior 6-183).  *Stable 0.9 cm solid nodule in the superior segment of the right lower lobe (axial series 4, image 37), previously 0.9 cm.  ESOPHAGUS:  Unremarkable.    UPPER ABDOMEN (limited):  Left renal cortical hypodensities, likely simple cysts.  Otherwise unremarkable.    BONES: Degenerative changes of the thoracic spine.  No fractures or focal osseous lesions.  Impression: 1. There are several stable pulmonary nodules as described above.  2. There are new ground-glass opacities  in the dependent portions of the right lung, consider aspiration.  3. There is a new 7 mm right lower lobe posterior segment nodule.  This may reflect infectious/inflammatory etiology given other findings in the chest.  Clinical considerations will determine if further investigation of this finding is to be pursued.    Electronically signed by resident: Ernesto Lui  Date:    04/19/2022  Time:    08:25    Electronically signed by: Katt Rubi  Date:    04/19/2022  Time:    10:02      All imaging within the last 24 hours was reviewed.       Discharge Planning   ALLIE: 4/27/2022     Code Status: DNR   Is the patient medically ready for discharge?: No    Reason for patient still in hospital (select all that apply): Patient trending condition, Treatment, Consult recommendations, and Pending disposition  Discharge Plan A: Home health   Discharge Delays: None known at this time

## 2022-04-26 NOTE — SUBJECTIVE & OBJECTIVE
"Interval HPI:   Glu trend 198-338  Did not receive basal last night as ordered    Overnight events: none  Eatin%  Nausea: No  Hypoglycemia and intervention: No  Fever: No  TPN and/or TF: No  If yes, type of TF/TPN and rate: na    BP (!) 102/58 (BP Location: Left arm, Patient Position: Lying)   Pulse (!) 59   Temp 97.8 °F (36.6 °C) (Oral)   Resp 17   Ht 5' 11" (1.803 m)   Wt 81.6 kg (179 lb 14.3 oz)   SpO2 (!) 94%   BMI 25.09 kg/m²     Labs Reviewed and Include    No results for input(s): GLU, CALCIUM, ALBUMIN, PROT, NA, K, CO2, CL, BUN, CREATININE, ALKPHOS, ALT, AST, BILITOT in the last 24 hours.  Lab Results   Component Value Date    WBC 7.29 2022    HGB 11.4 (L) 2022    HCT 34.4 (L) 2022    MCV 88 2022     2022     No results for input(s): TSH, FREET4 in the last 168 hours.  Lab Results   Component Value Date    HGBA1C 9.7 (H) 2022       Nutritional status:   Body mass index is 25.09 kg/m².  Lab Results   Component Value Date    ALBUMIN 2.4 (L) 2022    ALBUMIN 2.3 (L) 2022    ALBUMIN 2.5 (L) 2022     No results found for: PREALBUMIN    Estimated Creatinine Clearance: 72 mL/min (based on SCr of 0.9 mg/dL).    Accu-Checks  Recent Labs     22  1603 22  2006 22  0515 22  0731 22  1117 22  1425 22  1535 22  2159 22  0640 22  0721   POCTGLUCOSE 293* 90 287* 318* 439* 330* 338* 228* 198* 204*       Current Medications and/or Treatments Impacting Glycemic Control  Immunotherapy:    Immunosuppressants       None          Steroids:   Hormones (From admission, onward)                Start     Stop Route Frequency Ordered    22 1047  melatonin tablet 6 mg  (Medication Panel)         -- Oral Nightly PRN 22 0948          Pressors:    Autonomic Drugs (From admission, onward)                None          Hyperglycemia/Diabetes Medications:   Antihyperglycemics (From admission, onward) "                Start     Stop Route Frequency Ordered    04/25/22 2100  insulin detemir U-100 pen 8 Units         -- SubQ 2 times daily 04/25/22 1145    04/25/22 1645  insulin aspart U-100 pen 14 Units         -- SubQ 3 times daily with meals 04/25/22 1541    04/25/22 0931  insulin aspart U-100 pen 1-10 Units         -- SubQ Before meals & nightly PRN 04/25/22 0832    04/17/22 0111  insulin aspart U-100 pen 4 Units         -- SubQ With snacks 04/17/22 0012

## 2022-04-26 NOTE — PROGRESS NOTES
[facility] - Telemetry Stepdown  Endocrinology  Progress Note    Admit Date: 4/11/2022     76 year old  male with T2DM, HTN, CAD, STEMI, HLD, paroxysmal Afib, CVA, seizures who presents for recurrent DKA.  He [place] to [facility] ED on 4/11 with elevated blood glucose.  He was discharged from [facility] on 4/10 after being admitted for DKA on 4/5. Per family he was not feeling well after discharge to home and vomited several times on 4/11. Finger stick at home read High with unreadable blood glucose.  At this time daughter gave him 14 units of insulin and sublingual zofran. Other than malaise and nausea patient was not exhibiting any other signs/symptoms at home.    - In ER BG found to be 1015 with pCO2 6 and anion gap 35.  Hyperkalemic with K 7.0 and some EKG changes when compared to previous EKG.  Given calcium gluconate, and started on insulin gtt as well as subQ long acting insulin     - Endocrinology consulted for management of type 2 diabetes after resolution of DKA    - Spoke with daughter who states patient was discharged on 04/10/2022 with high glucose in the 400s which worsened after getting home and eating dinner; appropriate mealtime and correction insulin were given at that time. However, the next day patient's condition was worsening and they called EMS and his sugar was found to be in the thousands. She expresses concern that he cannot be care for adequately at home any longer and wishes to pursue skilled nursing facility.    Regarding Diabetes Mellitus:    Recurring episodes of DKA  Surgical Procedure and Date: NA  Diabetes diagnosis year: >20 years  Home Diabetes Medications:  During recent SNF was on Aspart 8 TID and glargine 8 units daily with sliding scale  How often checking glucose at home? >4 x day   Diabetes Complications include: Hyperglycemia, Hypoglycemia , and Diabetic peripheral neuropathy   Complicating diabetes co morbidities: History of CVA      Interval HPI:   Glu trend 198-338  Did not  "receive basal last night as ordered    Overnight events: none  Eatin%  Nausea: No  Hypoglycemia and intervention: No  Fever: No  TPN and/or TF: No  If yes, type of TF/TPN and rate: na    BP (!) 102/58 (BP Location: Left arm, Patient Position: Lying)   Pulse (!) 59   Temp 97.8 °F (36.6 °C) (Oral)   Resp 17   Ht 5' 11" (1.803 m)   Wt 81.6 kg (179 lb 14.3 oz)   SpO2 (!) 94%   BMI 25.09 kg/m²     Labs Reviewed and Include    No results for input(s): GLU, CALCIUM, ALBUMIN, PROT, NA, K, CO2, CL, BUN, CREATININE, ALKPHOS, ALT, AST, BILITOT in the last 24 hours.  Lab Results   Component Value Date    WBC 7.29 2022    HGB 11.4 (L) 2022    HCT 34.4 (L) 2022    MCV 88 2022     2022     No results for input(s): TSH, FREET4 in the last 168 hours.  Lab Results   Component Value Date    HGBA1C 9.7 (H) 2022       Nutritional status:   Body mass index is 25.09 kg/m².  Lab Results   Component Value Date    ALBUMIN 2.4 (L) 2022    ALBUMIN 2.3 (L) 2022    ALBUMIN 2.5 (L) 2022     No results found for: PREALBUMIN    Estimated Creatinine Clearance: 72 mL/min (based on SCr of 0.9 mg/dL).    Accu-Checks  Recent Labs     22  1603 22  2006 22  0515 22  0731 22  1117 22  1425 22  1535 22  2159 22  0640 22  0721   POCTGLUCOSE 293* 90 287* 318* 439* 330* 338* 228* 198* 204*       Current Medications and/or Treatments Impacting Glycemic Control  Immunotherapy:    Immunosuppressants       None          Steroids:   Hormones (From admission, onward)                Start     Stop Route Frequency Ordered    22 1047  melatonin tablet 6 mg  (Medication Panel)         -- Oral Nightly PRN 22 0948          Pressors:    Autonomic Drugs (From admission, onward)                None          Hyperglycemia/Diabetes Medications:   Antihyperglycemics (From admission, onward)                Start     Stop Route " Frequency Ordered    22 2100  insulin detemir U-100 pen 8 Units         -- SubQ 2 times daily 22 1145    22 1645  insulin aspart U-100 pen 14 Units         -- SubQ 3 times daily with meals 22 1541    22 0931  insulin aspart U-100 pen 1-10 Units         -- SubQ Before meals & nightly PRN 22 0832    22 0111  insulin aspart U-100 pen 4 Units         -- SubQ With snacks 22 0012            ASSESSMENT and PLAN    Ketosis-prone diabetes mellitus  Key History and Diagnostic Findings  - Admit for recurrent DKA  - A1c of 9.7 on 2022 from 11.5 on 2022    - Home Regimen: Levemir 8 units daily, aspart 8 units TIDWM   - Weight based dosin kg x 0.5 = 41 TDD x 0.5 = 20 basal / 20 prandial   - 1700/TDD = 41 (estimated insulin sensitivity factor)   - 450/TDD = 11 (estimated starting carb ratio for prandial dosing)    - Glucose Goals: 140-180mg/dL  - 24 hour glucose trend:  198-338  -  Variable diet    -  Missed last night basal (listed pt refused)    Plan  -  Levemir 8 units BID  -  Aspart 14 units TIDWM   -  Moderate correction scale      Dyslipidemia associated with type 2 diabetes mellitus  - on atorvastatin 40 mg daily  - last lipid profile from 2022 shows LDL at 89, above goal of 70  - ensure compliance and repeat lipid profile outpatient in a month, if it continues to be above goal, would consider adding medication to therapy.      Coronary artery disease  - Strive to optimize glucose control      Stewart Rashid MD  Endocrinology  [facility] - Telemetry Stepdown

## 2022-04-26 NOTE — PT/OT/SLP PROGRESS
Occupational Therapy   Treatment    Name: Kamar Muñoz  MRN: [id number]  Admitting Diagnosis:  Pulmonary cavitary lesion       Recommendations:     Discharge Recommendations: nursing facility, skilled  Discharge Equipment Recommendations:   (TBD)  Barriers to discharge:  Decreased caregiver support, Other (Comment) (spouse is also in the hospital at this time)    Assessment:     Kamar Muñoz is a 76 y.o. male with a medical diagnosis of Pulmonary cavitary lesion.  He presents with deficits in mobility and self-care tasks. Pt. Required CGA for sit to stand transfers and functional mobility to and from the bathroom with RW. Pt. Noted to become fatigued following mobility and required seated rest break.  Pt. Was able to engage in BUE there ex seated EOB. .  Pt. Would benefit from continued OT services to maximize safety and I with ADL tasks. Performance deficits affecting function are weakness, impaired endurance, impaired self care skills, impaired functional mobilty, gait instability, impaired cardiopulmonary response to activity.     Rehab Prognosis:  Good; patient would benefit from acute skilled OT services to address these deficits and reach maximum level of function.       Plan:     Patient to be seen 4 x/week to address the above listed problems via self-care/home management, therapeutic activities, therapeutic exercises  · Plan of Care Expires: 05/15/22  · Plan of Care Reviewed with: patient    Subjective   Pt. Reported that he needed to sit down after toileting  Pain/Comfort:  · Pain Rating 1: 0/10  · Pain Rating Post-Intervention 1: 0/10    Objective:     Communicated with: nurse prior to session.  Patient found supine with   upon OT entry to room.    General Precautions: Standard, diabetic, fall   Orthopedic Precautions:N/A   Braces: N/A  Respiratory Status: Room air     Occupational Performance:     Bed Mobility:    · Patient completed Supine to Sit with stand by assistance  · Patient completed Sit  to Supine with stand by assistance     Functional Mobility/Transfers:  · Patient completed Sit <> Stand Transfer with contact guard assistance  with  rolling walker   · Functional Mobility: Pt. Ambulated to and from the bathroom with rest break to toilet with RW and CGA.  · Pt. Performed SPT to toilet with RW  And grab bar with  CGA    Activities of Daily Living:  · Grooming: stand by assistance in stand to wash hands at sink/S to brush teeth seated EOB  · Lower Body Dressing: moderate assistance to don undergarment seated on toilet to initially thread BLE  · Toileting: contact guard assistance with managment of undergarment      [facility] 6 Click ADL: 17    Treatment & Education:  Pt. Educated on positioning to maintain skin integrity including floating heels and alternating posiiton in bed frequently  Pt. Performed 1 set 10 reps of BUE AROM there ex seated EOB for all major planes of BUE motion with rest breaks required    Patient left supine with all lines intact and call button in reachEducation:      GOALS:   Multidisciplinary Problems     Occupational Therapy Goals        Problem: Occupational Therapy    Goal Priority Disciplines Outcome Interventions   Occupational Therapy Goal     OT, PT/OT Ongoing, Progressing    Description: Goals to be met by: 4/29/2022     Patient will increase functional independence with ADLs by performing:    UE Dressing with Set-up Assistance.  LE Dressing with Minimal Assistance using AD PRN.  Grooming while standing at sink with Stand-by Assistance.  Toileting from toilet with Supervision for hygiene and clothing management.   Step transfer with Supervision using AD PRN.  Toilet transfer to toilet with Stand-by Assistance using AD PRN.                     Time Tracking:     OT Date of Treatment: 04/26/22  OT Start Time: 1121  OT Stop Time: 1146  OT Total Time (min): 25 min    Billable Minutes:Self Care/Home Management 15  Therapeutic Exercise 10    OT/JUAN: OT          4/26/2022

## 2022-04-27 LAB
ALBUMIN SERPL BCP-MCNC: 2.4 G/DL (ref 3.5–5.2)
ALP SERPL-CCNC: 97 U/L (ref 55–135)
ALT SERPL W/O P-5'-P-CCNC: 38 U/L (ref 10–44)
ANION GAP SERPL CALC-SCNC: 8 MMOL/L (ref 8–16)
AST SERPL-CCNC: 47 U/L (ref 10–40)
BASOPHILS # BLD AUTO: 0.03 K/UL (ref 0–0.2)
BASOPHILS NFR BLD: 0.4 % (ref 0–1.9)
BILIRUB SERPL-MCNC: 0.5 MG/DL (ref 0.1–1)
BUN SERPL-MCNC: 20 MG/DL (ref 8–23)
CALCIUM SERPL-MCNC: 8.4 MG/DL (ref 8.7–10.5)
CHLORIDE SERPL-SCNC: 100 MMOL/L (ref 95–110)
CO2 SERPL-SCNC: 27 MMOL/L (ref 23–29)
CREAT SERPL-MCNC: 0.8 MG/DL (ref 0.5–1.4)
DIFFERENTIAL METHOD: ABNORMAL
EOSINOPHIL # BLD AUTO: 0.4 K/UL (ref 0–0.5)
EOSINOPHIL NFR BLD: 6.1 % (ref 0–8)
ERYTHROCYTE [DISTWIDTH] IN BLOOD BY AUTOMATED COUNT: 15.8 % (ref 11.5–14.5)
EST. GFR  (AFRICAN AMERICAN): >60 ML/MIN/1.73 M^2
EST. GFR  (NON AFRICAN AMERICAN): >60 ML/MIN/1.73 M^2
GLUCOSE SERPL-MCNC: 135 MG/DL (ref 70–110)
HCT VFR BLD AUTO: 35.1 % (ref 40–54)
HGB BLD-MCNC: 11.2 G/DL (ref 14–18)
IMM GRANULOCYTES # BLD AUTO: 0.02 K/UL (ref 0–0.04)
IMM GRANULOCYTES NFR BLD AUTO: 0.3 % (ref 0–0.5)
LYMPHOCYTES # BLD AUTO: 2.2 K/UL (ref 1–4.8)
LYMPHOCYTES NFR BLD: 31.5 % (ref 18–48)
MAGNESIUM SERPL-MCNC: 1.5 MG/DL (ref 1.6–2.6)
MCH RBC QN AUTO: 28.8 PG (ref 27–31)
MCHC RBC AUTO-ENTMCNC: 31.9 G/DL (ref 32–36)
MCV RBC AUTO: 90 FL (ref 82–98)
MONOCYTES # BLD AUTO: 0.6 K/UL (ref 0.3–1)
MONOCYTES NFR BLD: 8.6 % (ref 4–15)
NEUTROPHILS # BLD AUTO: 3.6 K/UL (ref 1.8–7.7)
NEUTROPHILS NFR BLD: 53.1 % (ref 38–73)
NRBC BLD-RTO: 0 /100 WBC
PHOSPHATE SERPL-MCNC: 4.1 MG/DL (ref 2.7–4.5)
PLATELET # BLD AUTO: 243 K/UL (ref 150–450)
PMV BLD AUTO: 9.3 FL (ref 9.2–12.9)
POCT GLUCOSE: 160 MG/DL (ref 70–110)
POCT GLUCOSE: 223 MG/DL (ref 70–110)
POCT GLUCOSE: 253 MG/DL (ref 70–110)
POCT GLUCOSE: 51 MG/DL (ref 70–110)
POCT GLUCOSE: 69 MG/DL (ref 70–110)
POCT GLUCOSE: 86 MG/DL (ref 70–110)
POTASSIUM SERPL-SCNC: 4 MMOL/L (ref 3.5–5.1)
PROT SERPL-MCNC: 5.6 G/DL (ref 6–8.4)
RBC # BLD AUTO: 3.89 M/UL (ref 4.6–6.2)
SODIUM SERPL-SCNC: 135 MMOL/L (ref 136–145)
WBC # BLD AUTO: 6.86 K/UL (ref 3.9–12.7)

## 2022-04-27 PROCEDURE — 85025 COMPLETE CBC W/AUTO DIFF WBC: CPT | Performed by: INTERNAL MEDICINE

## 2022-04-27 PROCEDURE — 97530 THERAPEUTIC ACTIVITIES: CPT

## 2022-04-27 PROCEDURE — 84100 ASSAY OF PHOSPHORUS: CPT | Performed by: INTERNAL MEDICINE

## 2022-04-27 PROCEDURE — 94760 N-INVAS EAR/PLS OXIMETRY 1: CPT

## 2022-04-27 PROCEDURE — 99232 PR SUBSEQUENT HOSPITAL CARE,LEVL II: ICD-10-PCS | Mod: 95,,, | Performed by: INTERNAL MEDICINE

## 2022-04-27 PROCEDURE — 99232 SBSQ HOSP IP/OBS MODERATE 35: CPT | Mod: GC,,, | Performed by: INTERNAL MEDICINE

## 2022-04-27 PROCEDURE — 20600001 HC STEP DOWN PRIVATE ROOM

## 2022-04-27 PROCEDURE — 36415 COLL VENOUS BLD VENIPUNCTURE: CPT | Performed by: INTERNAL MEDICINE

## 2022-04-27 PROCEDURE — 63600175 PHARM REV CODE 636 W HCPCS: Performed by: STUDENT IN AN ORGANIZED HEALTH CARE EDUCATION/TRAINING PROGRAM

## 2022-04-27 PROCEDURE — 99232 PR SUBSEQUENT HOSPITAL CARE,LEVL II: ICD-10-PCS | Mod: GC,,, | Performed by: INTERNAL MEDICINE

## 2022-04-27 PROCEDURE — 80053 COMPREHEN METABOLIC PANEL: CPT | Performed by: INTERNAL MEDICINE

## 2022-04-27 PROCEDURE — 99232 SBSQ HOSP IP/OBS MODERATE 35: CPT | Mod: 95,,, | Performed by: INTERNAL MEDICINE

## 2022-04-27 PROCEDURE — 25000003 PHARM REV CODE 250: Performed by: INTERNAL MEDICINE

## 2022-04-27 PROCEDURE — 97116 GAIT TRAINING THERAPY: CPT

## 2022-04-27 PROCEDURE — 83735 ASSAY OF MAGNESIUM: CPT | Performed by: INTERNAL MEDICINE

## 2022-04-27 RX ORDER — INSULIN ASPART 100 [IU]/ML
14 INJECTION, SOLUTION INTRAVENOUS; SUBCUTANEOUS
Status: DISCONTINUED | OUTPATIENT
Start: 2022-04-27 | End: 2022-04-28

## 2022-04-27 RX ADMIN — DOCUSATE SODIUM 50 MG: 50 CAPSULE, LIQUID FILLED ORAL at 09:04

## 2022-04-27 RX ADMIN — SENNOSIDES AND DOCUSATE SODIUM 1 TABLET: 50; 8.6 TABLET ORAL at 08:04

## 2022-04-27 RX ADMIN — APIXABAN 5 MG: 5 TABLET, FILM COATED ORAL at 08:04

## 2022-04-27 RX ADMIN — SUCRALFATE 1 G: 1 SUSPENSION ORAL at 06:04

## 2022-04-27 RX ADMIN — INSULIN ASPART 14 UNITS: 100 INJECTION, SOLUTION INTRAVENOUS; SUBCUTANEOUS at 05:04

## 2022-04-27 RX ADMIN — PANTOPRAZOLE SODIUM 40 MG: 40 TABLET, DELAYED RELEASE ORAL at 09:04

## 2022-04-27 RX ADMIN — INSULIN ASPART 16 UNITS: 100 INJECTION, SOLUTION INTRAVENOUS; SUBCUTANEOUS at 08:04

## 2022-04-27 RX ADMIN — INSULIN ASPART 4 UNITS: 100 INJECTION, SOLUTION INTRAVENOUS; SUBCUTANEOUS at 08:04

## 2022-04-27 RX ADMIN — INSULIN ASPART 2 UNITS: 100 INJECTION, SOLUTION INTRAVENOUS; SUBCUTANEOUS at 05:04

## 2022-04-27 RX ADMIN — POLYETHYLENE GLYCOL 3350 17 G: 17 POWDER, FOR SOLUTION ORAL at 09:04

## 2022-04-27 RX ADMIN — DOCUSATE SODIUM 50 MG: 50 CAPSULE, LIQUID FILLED ORAL at 08:04

## 2022-04-27 RX ADMIN — SUCRALFATE 1 G: 1 SUSPENSION ORAL at 08:04

## 2022-04-27 RX ADMIN — SUCRALFATE 1 G: 1 SUSPENSION ORAL at 05:04

## 2022-04-27 RX ADMIN — APIXABAN 5 MG: 5 TABLET, FILM COATED ORAL at 09:04

## 2022-04-27 RX ADMIN — INSULIN ASPART 6 UNITS: 100 INJECTION, SOLUTION INTRAVENOUS; SUBCUTANEOUS at 12:04

## 2022-04-27 RX ADMIN — SUCRALFATE 1 G: 1 SUSPENSION ORAL at 12:04

## 2022-04-27 RX ADMIN — LACOSAMIDE 100 MG: 100 TABLET, FILM COATED ORAL at 08:04

## 2022-04-27 RX ADMIN — CLOPIDOGREL 75 MG: 75 TABLET, FILM COATED ORAL at 09:04

## 2022-04-27 RX ADMIN — LACOSAMIDE 100 MG: 100 TABLET, FILM COATED ORAL at 09:04

## 2022-04-27 RX ADMIN — METOPROLOL SUCCINATE 50 MG: 50 TABLET, EXTENDED RELEASE ORAL at 09:04

## 2022-04-27 RX ADMIN — SENNOSIDES AND DOCUSATE SODIUM 1 TABLET: 50; 8.6 TABLET ORAL at 09:04

## 2022-04-27 RX ADMIN — AMIODARONE HYDROCHLORIDE 200 MG: 200 TABLET ORAL at 09:04

## 2022-04-27 RX ADMIN — INSULIN ASPART 14 UNITS: 100 INJECTION, SOLUTION INTRAVENOUS; SUBCUTANEOUS at 12:04

## 2022-04-27 RX ADMIN — ATORVASTATIN CALCIUM 40 MG: 20 TABLET, FILM COATED ORAL at 09:04

## 2022-04-27 RX ADMIN — TAMSULOSIN HYDROCHLORIDE 0.4 MG: 0.4 CAPSULE ORAL at 09:04

## 2022-04-27 NOTE — PROGRESS NOTES
[facility] - Tele[facility] Stepdown  Endocrinology  Progress Note    Admit Date: 4/11/2022     76 year old  male with T2DM, HTN, CAD, STEMI, HLD, paroxysmal Afib, CVA, seizures who presents for recurrent DKA.  He presented to [facility] ED on 4/11 with elevated blood glucose.  He was discharged from [facility] on 4/10 after being admitted for DKA on 4/5. Per family he was not feeling well after discharge to home and vomited several times on 4/11. Finger stick at home read High with unreadable blood glucose.  At this time daughter gave him 14 units of insulin and sublingual zofran. Other than malaise and nausea patient was not exhibiting any other signs/symptoms at home.    - In ER BG found to be 1015 with pCO2 6 and anion gap 35.  Hyperkalemic with K 7.0 and some EKG changes when compared to previous EKG.  Given calcium gluconate, and started on insulin gtt as well as subQ long acting insulin     - Endocrinology consulted for management of type 2 diabetes after resolution of DKA    - Spoke with daughter who states patient was discharged on 04/10/2022 with high glucose in the 400s which worsened after getting home and eating dinner; appropriate mealtime and correction insulin were given at that time. However, the next day patient's condition was worsening and they called EMS and his sugar was found to be in the thousands. She expresses concern that he cannot be care for adequately at home any longer and wishes to pursue skilled nursing facility.    Regarding Diabetes Mellitus:    Recurring episodes of DKA  Surgical Procedure and Date: NA  Diabetes diagnosis year: >20 years  Home Diabetes Medications:  During recent SNF was on Aspart 8 TID and glargine 8 units daily with sliding scale  How often checking glucose at home? >4 x day   Diabetes Complications include: Hyperglycemia, Hypoglycemia , and Diabetic peripheral neuropathy   Complicating diabetes co morbidities: History of CVA      Interval HPI:   Glu trend    Had low  "overnight after too much prandial, recovered with  treatment  Overnight events: none  Eating:   variable  Nausea: No  Hypoglycemia and intervention:  yes  Fever: No  TPN and/or TF: No  If yes, type of TF/TPN and rate: na    /64 (BP Location: Right arm, Patient Position: Lying)   Pulse (!) 56   Temp 97.9 °F (36.6 °C) (Oral)   Resp 18   Ht 5' 11" (1.803 m)   Wt 81.6 kg (179 lb 14.3 oz)   SpO2 (!) 92%   BMI 25.09 kg/m²     Labs Reviewed and Include    Recent Labs   Lab 04/27/22  0459   *   CALCIUM 8.4*   ALBUMIN 2.4*   PROT 5.6*   *   K 4.0   CO2 27      BUN 20   CREATININE 0.8   ALKPHOS 97   ALT 38   AST 47*   BILITOT 0.5     Lab Results   Component Value Date    WBC 6.86 04/27/2022    HGB 11.2 (L) 04/27/2022    HCT 35.1 (L) 04/27/2022    MCV 90 04/27/2022     04/27/2022     No results for input(s): TSH, FREET4 in the last 168 hours.  Lab Results   Component Value Date    HGBA1C 9.7 (H) 04/05/2022       Nutritional status:   Body mass index is 25.09 kg/m².  Lab Results   Component Value Date    ALBUMIN 2.4 (L) 04/27/2022    ALBUMIN 2.4 (L) 04/25/2022    ALBUMIN 2.3 (L) 04/24/2022     No results found for: PREALBUMIN    Estimated Creatinine Clearance: 81.1 mL/min (based on SCr of 0.8 mg/dL).    Accu-Checks  Recent Labs     04/25/22  1117 04/25/22  1425 04/25/22  1535 04/25/22  2159 04/26/22  0640 04/26/22  0721 04/26/22  1119 04/26/22  1521 04/26/22  2152 04/27/22  0720   POCTGLUCOSE 439* 330* 338* 228* 198* 204* 206* 169* 51* 223*       Current Medications and/or Treatments Impacting Glycemic Control  Immunotherapy:    Immunosuppressants       None          Steroids:   Hormones (From admission, onward)                Start     Stop Route Frequency Ordered    04/14/22 1047  melatonin tablet 6 mg  (Medication Panel)         -- Oral Nightly PRN 04/14/22 0948          Pressors:    Autonomic Drugs (From admission, onward)                None          Hyperglycemia/Diabetes " Medications:   Antihyperglycemics (From admission, onward)                Start     Stop Route Frequency Ordered    22 1130  insulin aspart U-100 pen 14 Units         -- SubQ 3 times daily with meals 22 1046    22 2100  insulin detemir U-100 pen 8 Units         -- SubQ 2 times daily 22 1145    22 0931  insulin aspart U-100 pen 1-10 Units         -- SubQ Before meals & nightly PRN 22 0832    22 0111  insulin aspart U-100 pen 4 Units         -- SubQ With snacks 22 0012            ASSESSMENT and PLAN    Ketosis-prone diabetes mellitus  Key History and Diagnostic Findings  - Admit for recurrent DKA  - A1c of 9.7 on 2022 from 11.5 on 2022    - Home Regimen: Levemir 8 units daily, aspart 8 units TIDWM   - Weight based dosin kg x 0.5 = 41 TDD x 0.5 = 20 basal / 20 prandial   - 1700/TDD = 41 (estimated insulin sensitivity factor)   - 450/TDD = 11 (estimated starting carb ratio for prandial dosing)    - Glucose Goals: 140-180mg/dL  - 24 hour glucose trend: 50 - 223 tight   -  Variable diet %, inconsistent carbs with meals, diet indiscretions     Plan  -  Levemir 8 units BID  -  Decrease Aspart 14 units TIDWM  -  Moderate correction scale          Dyslipidemia associated with type 2 diabetes mellitus  - on atorvastatin 40 mg daily  - last lipid profile from 2022 shows LDL at 89, above goal of 70  - ensure compliance and repeat lipid profile outpatient in a month, if it continues to be above goal, would consider adding medication to therapy.      Coronary artery disease  - Strive to optimize glucose control        Stewart Rashid MD  Endocrinology  [facility] - Tele[facility] Stepdown

## 2022-04-27 NOTE — ASSESSMENT & PLAN NOTE
Key History and Diagnostic Findings  - Admit for recurrent DKA  - A1c of 9.7 on 2022 from 11.5 on 2022    - Home Regimen: Levemir 8 units daily, aspart 8 units TIDWM   - Weight based dosin kg x 0.5 = 41 TDD x 0.5 = 20 basal / 20 prandial   - 1700/TDD = 41 (estimated insulin sensitivity factor)   - 450/TDD = 11 (estimated starting carb ratio for prandial dosing)    - Glucose Goals: 140-180mg/dL  - 24 hour glucose trend: 50 - 223 tight   -  Variable diet %, inconsistent carbs with meals, diet indiscretions     Plan  -  Levemir 8 units BID  -  Decrease Aspart 14 units TIDWM  -  Moderate correction scale

## 2022-04-27 NOTE — TREATMENT PLAN
"GI Treatment Plan:  77yo PMHx CAD, pAF on eliquis, HLD, ID-T2DM c/b multiple episodes of DKA. GI consulted for "chronic nausea."    Patient denies any nausea right now. Says that when his sugars are controlled he is not nauseous. Tolerates normal diet at home. No dysphagia, odynophagia, abd pain, diarrhea, hematemesis. No prior EGD.    Exam:  VS wnl  Abd: Soft, NTND    Impression:  Suspect this nausea is attributed to poorly controlled sugars in setting of DKA. If continues to have nausea despite poorly controlled sugars can get gastric emptying study outpatient.    Recommendation:  - Can do anti-emetics while inpatient for treatment of DKA  - Would control sugars  - PO PPI for GERD  - Can consider outpatient gastric emptying study  - No need for inpatient EGD at this time given lack of symptoms and chronic AC w/ eliquis.    We will sign off. Please call back with questions or if patient has change in clinical status.    Aly Villatoro MD  Gastroenterology/Hepatology, PGY IV  [facility]    "

## 2022-04-27 NOTE — ASSESSMENT & PLAN NOTE
Numerous episodes of nausea (and vomiting at home) during this admit and with prior admits  -no history of peptic ulcer disease per the patient but definite reflux symptoms  -therapy with pantoprazole alone ineffective; symptoms persist with addition of Carafate.  -suspect is multifactorial with GERD and poor gastric emptying suspected  -he is not a candidate for Raglan due to history of seizure  -will try scheduled Zofran with meals to see if symptoms improve  -he is having regular bowel movements which are soft

## 2022-04-27 NOTE — PLAN OF CARE
Problem: Pain Acute  Goal: Acceptable Pain Control and Functional Ability  Outcome: Ongoing, Progressing   Patient is alert, oriented and conscious. Vital signs taken and recorded. SPO2 maintain in RA. Medication given as per order. Mobilization done in hallway with assist. Intake output recorded. Skin care and wound care done. Accu check done. Will continue to monitor.

## 2022-04-27 NOTE — PT/OT/SLP PROGRESS
Physical Therapy Treatment    Patient Name:  Kamar Muñoz   MRN:  [id number]    Recommendations:     Discharge Recommendations:  nursing facility, skilled   Discharge Equipment Recommendations: other (see comments) (tbd)   Barriers to discharge: Decreased caregiver support    Assessment:     Kamar Muñoz is a 76 y.o. male admitted with a medical diagnosis of Pulmonary cavitary lesion.  He presents with the following impairments/functional limitations:  weakness, impaired endurance, impaired self care skills, impaired functional mobilty, gait instability, impaired balance, impaired cognition, decreased coordination, decreased safety awareness. Kamar Muñoz would benefit from acute PT intervention to address listed functional deficits, provide patient/caregiver education, reduce fall risk, and maximize (I) and safety with functional mobility.    Pt participated well with PT on this date. Pt found sitting EOB and agreeable to ambulation in ding. Pt able to walk ~100 ft with RW and CGA. No instances of LOB on this date, but Pt remains a fall risk as he is frail at baseline. Pt will continue to benefit from acute PT services in order to maximize safety and (I) with functional mobility.      After hospital discharge, pt would benefit from SNF to continue addressing therapy impairments.    Rehab Prognosis: Good; patient would benefit from acute skilled PT services to address these deficits and reach maximum level of function.      Plan:     During this hospitalization, patient to be seen 4 x/week to address the identified rehab impairments via gait training, therapeutic activities, therapeutic exercises, neuromuscular re-education and progress toward the following goals:    · Plan of Care Expires:  05/04/22    This plan of care has been discussed with the patient/caregiver, who was included in its development and is in agreement with the identified goals and treatment plan.     Subjective     Communicated  with RN prior to session.  Patient agreeable to participate.     Chief Complaint: none noted  Patient/Family Comments/goals: to become more independent  Pt pain prior to session: 0/10  Pt pain following session: 0/10    Objective:     Patient found supine with telemetry  upon PT entry to room.    General Precautions: Standard, fall, diabetic   Orthopedic Precautions:N/A   Braces: N/A     Functional Mobility:    Bed Mobility:  · Supine to Sit: Stand-by Assistance  · Sit to Supine: Contact Guard Assistance    Transfers:   · Sit to Stand Transfer: Contact Guard Assistance  from EOB with RW AD              Gait:  · Patient received gait training ~100 feet with Contact Guard Assistance and rolling walker  · Gait Assessment: decreased speed, step length, swing through, heel strike, forward flexed posture, and downward gaze.   · PT providing verbal and tactile cues for improved upright posture, gaze direction, AD management, and gait fluidity.     Balance:  · Static Sit: Supervision at EOB x ~8 minutes  · Static Stand: Contact-Guard Assist with Rolling walker      Therapeutic Activities/Exercises     Pt educated on importance of proper AD use to maintain safety with OOB mobility    Education regarding importance of upright positioning to promote cardiopulmonary health.   Education on importance of consistent OOB mobility to improve functional mobility    Patient educated on the importance of early mobility to prevent functional decline during hospital stay    Patient was instructed to utilize staff assistance for mobility/transfers.  Patient was educated on PT POC and all questions answered within PT scope of practice.    Patient able to verbalize understanding; will follow-up with pt during current admit for additional questions/concerns within scope of practice.     AM-PAC 6 CLICK MOBILITY  Total Score:17     Patient left HOB elevated with all lines intact, call button in reach and RN notified.        History/Goals:      PAST MEDICAL HISTORY:  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    Hyperlipidemia     Hypertension     Kidney stone     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 8/2/2012    STEMI involving right coronary artery 1/9/2022    Type II or unspecified type diabetes mellitus with neurological manifestations, uncontrolled(250.62) 3/8/2013    Urinary tract infection        Past Surgical History:   Procedure Laterality Date    BACK SURGERY      CATARACT EXTRACTION W/  INTRAOCULAR LENS IMPLANT Right     Per Dr Romero note 11/2018    COLONOSCOPY N/A 1/28/2019    Procedure: COLONOSCOPY Suprep;  Surgeon: Anh Johnson MD;  Location: [facility] ENDO;  Service: Endoscopy;  Laterality: N/A;    EYE SURGERY      HERNIA REPAIR      LEFT HEART CATHETERIZATION Left 1/9/2022    Procedure: CATHETERIZATION, HEART, LEFT;  Surgeon: Will Hurst III, MD;  Location: [facility] CATH LAB/EP;  Service: Cardiology;  Laterality: Left;    renal stones      SHOULDER OPEN ROTATOR CUFF REPAIR         GOALS:   Multidisciplinary Problems     Physical Therapy Goals        Problem: Physical Therapy    Goal Priority Disciplines Outcome Goal Variances Interventions   Physical Therapy Goal     PT, PT/OT Ongoing, Progressing     Description: Goals to met by 4/29/2022     1. Supine to sit with Modified [place]  2. Sit to supine with Modified [place]  3. Sit to stand transfer with Modified [place]  4. Bed to chair transfer with Modified [place] using Rolling Walker  5. Gait  x 150 feet with Stand-by Assistance using Rolling Walker   6. Ascend/Descend 6 inch curb step with Contact Guard Assistance using LRAD.  7. Lower extremity exercise program x15 reps per Instruction, with supervision in order to facilitate improvement in functional independence                      Time Tracking:     PT Received On: 04/27/22  PT Start Time: 1422     PT Stop Time: 1445  PT Total Time (min): 23 min     Billable Minutes: Gait Training 15 and Therapeutic Activity 8      Hiram Stevenson, JOSH  04/27/2022

## 2022-04-27 NOTE — SUBJECTIVE & OBJECTIVE
"Interval HPI:   Glu trend    Had low overnight after too much prandial, recovered with  treatment  Overnight events: none  Eating:   variable  Nausea: No  Hypoglycemia and intervention:  yes  Fever: No  TPN and/or TF: No  If yes, type of TF/TPN and rate: na    /64 (BP Location: Right arm, Patient Position: Lying)   Pulse (!) 56   Temp 97.9 °F (36.6 °C) (Oral)   Resp 18   Ht 5' 11" (1.803 m)   Wt 81.6 kg (179 lb 14.3 oz)   SpO2 (!) 92%   BMI 25.09 kg/m²     Labs Reviewed and Include    Recent Labs   Lab 04/27/22  0459   *   CALCIUM 8.4*   ALBUMIN 2.4*   PROT 5.6*   *   K 4.0   CO2 27      BUN 20   CREATININE 0.8   ALKPHOS 97   ALT 38   AST 47*   BILITOT 0.5     Lab Results   Component Value Date    WBC 6.86 04/27/2022    HGB 11.2 (L) 04/27/2022    HCT 35.1 (L) 04/27/2022    MCV 90 04/27/2022     04/27/2022     No results for input(s): TSH, FREET4 in the last 168 hours.  Lab Results   Component Value Date    HGBA1C 9.7 (H) 04/05/2022       Nutritional status:   Body mass index is 25.09 kg/m².  Lab Results   Component Value Date    ALBUMIN 2.4 (L) 04/27/2022    ALBUMIN 2.4 (L) 04/25/2022    ALBUMIN 2.3 (L) 04/24/2022     No results found for: PREALBUMIN    Estimated Creatinine Clearance: 81.1 mL/min (based on SCr of 0.8 mg/dL).    Accu-Checks  Recent Labs     04/25/22  1117 04/25/22  1425 04/25/22  1535 04/25/22  2159 04/26/22  0640 04/26/22  0721 04/26/22  1119 04/26/22  1521 04/26/22  2152 04/27/22  0720   POCTGLUCOSE 439* 330* 338* 228* 198* 204* 206* 169* 51* 223*       Current Medications and/or Treatments Impacting Glycemic Control  Immunotherapy:    Immunosuppressants       None          Steroids:   Hormones (From admission, onward)                Start     Stop Route Frequency Ordered    04/14/22 1047  melatonin tablet 6 mg  (Medication Panel)         -- Oral Nightly PRN 04/14/22 0948          Pressors:    Autonomic Drugs (From admission, onward)                None "          Hyperglycemia/Diabetes Medications:   Antihyperglycemics (From admission, onward)                Start     Stop Route Frequency Ordered    04/27/22 1130  insulin aspart U-100 pen 14 Units         -- SubQ 3 times daily with meals 04/27/22 1046    04/25/22 2100  insulin detemir U-100 pen 8 Units         -- SubQ 2 times daily 04/25/22 1145    04/25/22 0931  insulin aspart U-100 pen 1-10 Units         -- SubQ Before meals & nightly PRN 04/25/22 0832    04/17/22 0111  insulin aspart U-100 pen 4 Units         -- SubQ With snacks 04/17/22 0012           No cyanosis, clubbing or edema

## 2022-04-27 NOTE — CONSULTS
Patient seen for wound care follow up and new consult for heel. Both of his heels are red but blanchable. There are foam dressings in place and the patient is able to move his feet on his own. Similarly he has redness to the gluteal cleft and the coccyx area but the skin is intact. He has a preventative foam dressing in place and is able to reposition on his own. No wound care needs at this time. Nursing to continue care.

## 2022-04-27 NOTE — SUBJECTIVE & OBJECTIVE
This encounter was provided through telemedicine.  Patient was transferred to the telemedicine service on:  04/22/2022   The patient location is: [id number] A admitted 4/11/2022  8:55 PM.  Present with the patient at the time of the telemed/virtual assessment: Telepresenter    Interval History/Overnight Events:     Decreased nausea with zofran before meals but recurred last pm after dinner despite zofran; minimized symptoms when he was seen by GI; proceeding with GES as recommended by GI; was able to eat more with zofran    -he will be going home with home health at discharge    Review of Systems   Constitutional:  Positive for activity change and fatigue.   Respiratory:  Negative for cough.    Cardiovascular:  Negative for leg swelling.   Gastrointestinal:  Negative for diarrhea and vomiting.   Endocrine: Positive for polyuria.   Genitourinary:  Negative for dysuria.   Musculoskeletal:  Positive for myalgias (buttock pain making it difficulty to sit).   Neurological:  Positive for weakness.   Psychiatric/Behavioral:  Negative for behavioral problems and confusion.       Inpatient Medications:  Scheduled Meds:   amiodarone  200 mg Oral Daily    apixaban  5 mg Oral BID    atorvastatin  40 mg Oral Daily    clopidogreL  75 mg Oral Daily    docusate sodium  50 mg Oral BID    insulin aspart U-100  14 Units Subcutaneous TIDWM    insulin detemir U-100  8 Units Subcutaneous BID    lacosamide  100 mg Oral Q12H    metoprolol succinate  50 mg Oral Daily    ondansetron  4 mg Oral TID WM    pantoprazole  40 mg Oral Daily    polyethylene glycol  17 g Oral Daily    senna-docusate 8.6-50 mg  1 tablet Oral BID    sucralfate  1 g Oral QID (AC & HS)    tamsulosin  0.4 mg Oral Daily     Continuous Infusions:  PRN Meds:.acetaminophen, calcium carbonate, cloNIDine, dextrose 10%, diphenhydrAMINE, glucagon (human recombinant), hydrALAZINE, insulin aspart U-100, insulin aspart U-100, melatonin, ondansetron, prochlorperazine, senna,  simethicone, sodium chloride 0.9%      Objective:     Temp:  [97 °F (36.1 °C)-98.3 °F (36.8 °C)] 98.3 °F (36.8 °C)  Pulse:  [54-86] 63  Resp:  [16-18] 18  SpO2:  [90 %-99 %] 94 %  BP: (104-131)/(54-80) 104/54      Intake/Output Summary (Last 24 hours) at 4/27/2022 1141  Last data filed at 4/26/2022 1715  Gross per 24 hour   Intake 480 ml   Output --   Net 480 ml          Body mass index is 25.09 kg/m².    Physical Exam  Vitals and nursing note reviewed.   Constitutional:       General: He is not in acute distress.     Appearance: He is normal weight. He is ill-appearing.   HENT:      Head: Normocephalic and atraumatic.      Right Ear: Hearing normal.      Left Ear: Hearing normal.      Nose: Nose normal.   Eyes:      General: No scleral icterus.        Right eye: No discharge.         Left eye: No discharge.      Extraocular Movements: Extraocular movements intact.   Cardiovascular:      Rate and Rhythm: Normal rate.   Pulmonary:      Effort: Pulmonary effort is normal. No accessory muscle usage or respiratory distress.   Skin:     Findings: No rash.   Neurological:      General: No focal deficit present.      Mental Status: He is alert and oriented to person, place, and time.      Cranial Nerves: No cranial nerve deficit.      Motor: No weakness.   Psychiatric:         Attention and Perception: Attention normal.         Mood and Affect: Affect is flat.         Speech: Speech normal.         Behavior: Behavior is cooperative.        Labs:  Recent Results (from the past 24 hour(s))   POCT glucose    Collection Time: 04/26/22  3:21 PM   Result Value Ref Range    POCT Glucose 169 (H) 70 - 110 mg/dL   POCT glucose    Collection Time: 04/26/22  9:52 PM   Result Value Ref Range    POCT Glucose 51 (L) 70 - 110 mg/dL   CBC Auto Differential    Collection Time: 04/27/22  4:59 AM   Result Value Ref Range    WBC 6.86 3.90 - 12.70 K/uL    RBC 3.89 (L) 4.60 - 6.20 M/uL    Hemoglobin 11.2 (L) 14.0 - 18.0 g/dL    Hematocrit 35.1  (L) 40.0 - 54.0 %    MCV 90 82 - 98 fL    MCH 28.8 27.0 - 31.0 pg    MCHC 31.9 (L) 32.0 - 36.0 g/dL    RDW 15.8 (H) 11.5 - 14.5 %    Platelets 243 150 - 450 K/uL    MPV 9.3 9.2 - 12.9 fL    Immature Granulocytes 0.3 0.0 - 0.5 %    Gran # (ANC) 3.6 1.8 - 7.7 K/uL    Immature Grans (Abs) 0.02 0.00 - 0.04 K/uL    Lymph # 2.2 1.0 - 4.8 K/uL    Mono # 0.6 0.3 - 1.0 K/uL    Eos # 0.4 0.0 - 0.5 K/uL    Baso # 0.03 0.00 - 0.20 K/uL    nRBC 0 0 /100 WBC    Gran % 53.1 38.0 - 73.0 %    Lymph % 31.5 18.0 - 48.0 %    Mono % 8.6 4.0 - 15.0 %    Eosinophil % 6.1 0.0 - 8.0 %    Basophil % 0.4 0.0 - 1.9 %    Differential Method Automated    Comprehensive Metabolic Panel    Collection Time: 04/27/22  4:59 AM   Result Value Ref Range    Sodium 135 (L) 136 - 145 mmol/L    Potassium 4.0 3.5 - 5.1 mmol/L    Chloride 100 95 - 110 mmol/L    CO2 27 23 - 29 mmol/L    Glucose 135 (H) 70 - 110 mg/dL    BUN 20 8 - 23 mg/dL    Creatinine 0.8 0.5 - 1.4 mg/dL    Calcium 8.4 (L) 8.7 - 10.5 mg/dL    Total Protein 5.6 (L) 6.0 - 8.4 g/dL    Albumin 2.4 (L) 3.5 - 5.2 g/dL    Total Bilirubin 0.5 0.1 - 1.0 mg/dL    Alkaline Phosphatase 97 55 - 135 U/L    AST 47 (H) 10 - 40 U/L    ALT 38 10 - 44 U/L    Anion Gap 8 8 - 16 mmol/L    eGFR if African American >60.0 >60 mL/min/1.73 m^2    eGFR if non African American >60.0 >60 mL/min/1.73 m^2   Magnesium    Collection Time: 04/27/22  4:59 AM   Result Value Ref Range    Magnesium 1.5 (L) 1.6 - 2.6 mg/dL   Phosphorus    Collection Time: 04/27/22  4:59 AM   Result Value Ref Range    Phosphorus 4.1 2.7 - 4.5 mg/dL   POCT glucose    Collection Time: 04/27/22  7:20 AM   Result Value Ref Range    POCT Glucose 223 (H) 70 - 110 mg/dL   POCT glucose    Collection Time: 04/27/22 11:37 AM   Result Value Ref Range    POCT Glucose 253 (H) 70 - 110 mg/dL        Lab Results   Component Value Date    [id number] Negative 04/05/2022       Recent Labs   Lab 04/24/22  0417 04/25/22  0407 04/27/22  0459   WBC 7.25 7.29 6.86    LYMPH 25.1  1.8 28.4  2.1 31.5  2.2   HGB 11.4* 11.4* 11.2*   HCT 34.4* 34.4* 35.1*    225 243       Recent Labs   Lab 04/24/22 0417 04/25/22 0407 04/27/22 0459   * 136 135*   K 4.5 4.5 4.0    101 100   CO2 27 26 27   BUN 16 21 20   CREATININE 0.9 0.9 0.8   * 246* 135*   CALCIUM 8.2* 8.3* 8.4*   MG 1.4* 1.5* 1.5*   PHOS 3.1 2.9 4.1       Recent Labs   Lab 04/24/22 0417 04/25/22 0407 04/27/22 0459   ALKPHOS 105 103 97   ALT 32 36 38   AST 33 52* 47*   ALBUMIN 2.3* 2.4* 2.4*   PROT 5.7* 5.5* 5.6*   BILITOT 0.5 0.5 0.5          No results for input(s): DDIMER, FERRITIN, CRP, LDH, BNP, TROPONINI, CPK in the last 72 hours.    Invalid input(s): PROCALCITONIN    All labs within the last 24 hours were reviewed.     Microbiology:  Microbiology Results (last 7 days)       Procedure Component Value Units Date/Time    Urine culture [[id number]] Collected: 04/24/22 1219    Order Status: Completed Specimen: Urine Updated: 04/25/22 1247     Urine Culture, Routine Multiple organisms isolated. None in predominance.  Repeat if      clinically necessary.    Narrative:      Specimen Source->Urine    Culture, Respiratory with Gram Stain [[id number]]     Order Status: No result Specimen: Respiratory     AFB Culture & Smear [[id number]]     Order Status: No result Specimen: Sputum               Imaging  ECG Results              EKG 12-lead (Final result)  Result time 04/12/22 12:51:09      Final result by Interface, Lab In [facility] (04/12/22 12:51:09)                   Narrative:    Test Reason : R73.9,    Vent. Rate : 118 BPM     Atrial Rate : 111 BPM     P-R Int : 000 ms          QRS Dur : 162 ms      QT Int : 436 ms       P-R-T Axes : 000 -14 052 degrees     QTc Int : 611 ms    Wide QRS rhythm  Right bundle branch block  ST elevation anterior leads differential includes anterior STEMI vs,  repolarization abnormality  Abnormal ECG  When compared with ECG of 05-APR-2022 11:41,  Wide QRS rhythm has  replaced Sinus rhythm  Vent. rate has increased BY  48 BPM  Confirmed by Megha Stock MD ([id number]) on 4/12/2022 12:51:00 PM    Referred By: [email or web address]   SELF           Confirmed By:Megha Stock MD                                    Results for orders placed during the hospital encounter of 01/25/22    Echo    Interpretation Summary  · There is abnormal septal wall motion.  · The left ventricle is normal in size with low normal systolic function.  · The estimated ejection fraction is 50%.  · Normal left ventricular diastolic function.  · Mild left atrial enlargement.  · Normal right ventricular size with normal right ventricular systolic function.  · Mild mitral regurgitation.  · There are segmental left ventricular wall motion abnormalities.  · Mild tricuspid regurgitation.  · Elevated central venous pressure (15 mmHg).      CT Chest Without Contrast  Narrative: EXAMINATION:  CT CHEST WITHOUT CONTRAST    CLINICAL HISTORY:  pulmonary nodule, follow up;    TECHNIQUE:  Low dose axial images, sagittal and coronal reformations were obtained from the thoracic inlet to the lung bases. Contrast was not administered.    COMPARISON:  Chest x-ray 04/11/2022, 03/05/2022; CT chest 12/01/2021, 09/23/2021, 04/14/2021.    FINDINGS:  SOFT TISSUES:  Unremarkable.    HEART AND MEDIASTINUM:  Several slightly prominent mediastinal lymph nodes, including 1.6 cm level 4R right lower paratracheal node (axial series 2, image 51), grossly stable compared to prior.  Multi-vessel coronary arterial calcific plaques ranging from mild to moderate-severe.  Scattered calcific plaques in the visualized aorta.    PLEURA:  Unremarkable.    LUNGS AND AIRWAYS:  Airways are patent.  Advanced bilateral centrilobular and paraseptal emphysema with subpleural reticulations and bandlike opacities in the bilateral upper lobes with interval improvement in interlobular septal thickening.    There are new ground-glass opacities in the dependent portions of the  right upper lobe and the right lower lobe, consider aspiration.    There is a new 7 mm right lower lobe posterior segment nodule (4-331)    There are several stable to mildly improved, bilateral solid pulmonary nodules with index lesions as follows:    *Evolving appearance of a right upper lobe posterior segment cavitary lesion with internal dependent mass; the cavity demonstrates a thinner wall and has decreased in diameter.  There is adjacent contracture of parenchyma.  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  The central debris is decreased in size,, now measuring 1.0 cm (axial series 4, image 81), previously 2.0 cm.  *Triangular 1.4 cm nodule in the posterior segment of the right upper lobe (axial series 4, image 181) previously 1.8 cm  *Stable appearance of previously described 1.0 cm solid nodule in the posterior segment of the right upper lobe (4-277, prior exam 6-290).  *Stable 12 mm right upper lobe triangular opacity (4-203, prior 6-183).  *Stable 0.9 cm solid nodule in the superior segment of the right lower lobe (axial series 4, image 37), previously 0.9 cm.  ESOPHAGUS:  Unremarkable.    UPPER ABDOMEN (limited):  Left renal cortical hypodensities, likely simple cysts.  Otherwise unremarkable.    BONES: Degenerative changes of the thoracic spine.  No fractures or focal osseous lesions.  Impression: 1. There are several stable pulmonary nodules as described above.  2. There are new ground-glass opacities in the dependent portions of the right lung, consider aspiration.  3. There is a new 7 mm right lower lobe posterior segment nodule.  This may reflect infectious/inflammatory etiology given other findings in the chest.  Clinical considerations will determine if further investigation of this finding is to be pursued.    Electronically signed by resident: Ernesto Lui  Date:    04/19/2022  Time:    08:25    Electronically signed by: Katt  Greyson  Date:    04/19/2022  Time:    10:02      All imaging within the last 24 hours was reviewed.       Discharge Planning   ALLIE: 4/29/2022     Code Status: DNR   Is the patient medically ready for discharge?: No    Reason for patient still in hospital (select all that apply): Patient trending condition, Treatment, Consult recommendations, and Pending disposition  Discharge Plan A: Home health   Discharge Delays: None known at this time

## 2022-04-27 NOTE — PROGRESS NOTES
[facility] - Telemetry [facility] Medicine  Telemedicine Progress Note    Patient Name: Kamar Muñoz  MRN: [id number]  Patient Class: IP- Inpatient   Admission Date: 4/11/2022  Length of Stay: 15 days  Attending Physician: Komal Huynh MD  Primary Care Provider: Basim Guerrero MD          Subjective:     Principal Problem:Pulmonary cavitary lesion        HPI:  Mr. Kamar Muñoz is a 76 y.o. male with a past medical history of HTN, Type 2 DM, CAD, STEMI, HLD, paroxysmal Afib, CVA, seizures and recurrent DKA.  He presented to [facility] ED on 4/11 with elevated blood glucose.  He was discharged from [facility] on 4/10 after being admitted for DKA on 4/5.  At time of exam patient is alert but altered and unable to provide any history.  Spoke with patient's daughter who states she is a primary caregiver at home and obtained history as well as review of chart.  Per family he was not feeling well after discharge to home and vomited several times on 4/11. Unknown characteristics of emesis. Finger stick at home read High with unreadable blood glucose.  At this time daughter gave him 14 units of insulin and sublingual zofran. Other than malaise and nausea patient was not exhibiting any other signs/symptoms at home.  In ER BG found to be 1015 with pCO2 6 and anion gap 35.  Hyperkalemic with K 7.0 and some EKG changes when compared to previous EKG.  Given calcium gluconate, and started on insulin gtt as well as subQ long acting insulin.      Critical Care Medicine consulted for DKA and admitted to MICU.        Overview/Hospital Course:  Admitted to MICU on 4/11 for DKA with BG >1000, pCO2 6, AG 35, and hyperkalemia.  Given Calcium gluconate and started on insulin gtt in ED.  Hyperkalemia not improved on repeat labs and bolused with IV insulin.  Infectious workup sent and broad spectrum abx started.  4/12 potassium improving with insulin. 4/13 Gap closed, insulin gtt turned off and switched to subq insulin.  Endocrine consulted for insulin mgmt. He was stepped down to  4/14.    Since step down stable. Advanced diet. Endocrine following and titrating insulin. Poor po intake making it difficult to adjust insulin. CT chest with cavitary lesion, pulmonary and ID consulted. SNF once medically stable for dc.        This encounter was provided through telemedicine.  Patient was transferred to the telemedicine service on:  04/22/2022   The [facility] location is: [facility] admitted 4/11/2022  8:55 PM.  Present with the patient at the time of the telemed/virtual assessment: Telepresenter    Interval History/Overnight Events:     Unchanged nausea from yesterday with burning associated with food but occasionally prior to eating; unable to take reglan due to seizure hx; trial of zofran with meals; remains with poor po intake due to disliking the food preferences    -he will be going home with home health at discharge    Review of Systems   Constitutional:  Positive for activity change and fatigue.   Respiratory:  Negative for cough.    Cardiovascular:  Negative for leg swelling.   Gastrointestinal:  Negative for diarrhea and vomiting.   Endocrine: Positive for polyuria.   Genitourinary:  Negative for dysuria.   Musculoskeletal:  Positive for myalgias (buttock pain making it difficulty to sit).   Neurological:  Positive for weakness.   Psychiatric/Behavioral:  Negative for behavioral problems and confusion.       Inpatient Medications:  Scheduled Meds:   amiodarone  200 mg Oral Daily    amoxicillin-clavulanate 875-125mg  1 tablet Oral Q12H    apixaban  5 mg Oral BID    atorvastatin  40 mg Oral Daily    clopidogreL  75 mg Oral Daily    docusate sodium  50 mg Oral BID    insulin aspart U-100  14 Units Subcutaneous TIDWM    insulin detemir U-100  8 Units Subcutaneous BID    lacosamide  100 mg Oral Q12H    metoprolol succinate  50 mg Oral Daily    pantoprazole  40 mg Oral Daily    polyethylene glycol  17 g Oral Daily     senna-docusate 8.6-50 mg  1 tablet Oral BID    sucralfate  1 g Oral QID (AC & HS)    tamsulosin  0.4 mg Oral Daily     Continuous Infusions:  PRN Meds:.acetaminophen, calcium carbonate, cloNIDine, dextrose 10%, diphenhydrAMINE, glucagon (human recombinant), hydrALAZINE, insulin aspart U-100, insulin aspart U-100, melatonin, ondansetron, prochlorperazine, senna, simethicone, sodium chloride 0.9%      Objective:     Temp:  [97.6 °F (36.4 °C)-98.2 °F (36.8 °C)] 97.6 °F (36.4 °C)  Pulse:  [62-86] 64  Resp:  [16-18] 16  SpO2:  [95 %-99 %] 96 %  BP: (102-137)/(52-80) 117/64      Intake/Output Summary (Last 24 hours) at 4/26/2022 1101  Last data filed at 4/26/2022 0745  Gross per 24 hour   Intake 720 ml   Output --   Net 720 ml          Body mass index is 25.09 kg/m².    Physical Exam  Vitals and nursing note reviewed.   Constitutional:       General: He is not in acute distress.     Appearance: He is normal weight. He is ill-appearing.   HENT:      Head: Normocephalic and atraumatic.      Right Ear: Hearing normal.      Left Ear: Hearing normal.      Nose: Nose normal.   Eyes:      General: No scleral icterus.        Right eye: No discharge.         Left eye: No discharge.      Extraocular Movements: Extraocular movements intact.   Cardiovascular:      Rate and Rhythm: Normal rate.   Pulmonary:      Effort: Pulmonary effort is normal. No accessory muscle usage or respiratory distress.   Skin:     Findings: No rash.   Neurological:      General: No focal deficit present.      Mental Status: He is alert and oriented to person, place, and time.      Cranial Nerves: No cranial nerve deficit.      Motor: No weakness.   Psychiatric:         Attention and Perception: Attention normal.         Mood and Affect: Affect is flat.         Speech: Speech normal.         Behavior: Behavior is cooperative.        Labs:  Recent Results (from the past 24 hour(s))   POCT glucose    Collection Time: 04/25/22 11:17 AM   Result Value Ref  Range    POCT Glucose 439 (H) 70 - 110 mg/dL   POCT glucose    Collection Time: 04/25/22  2:25 PM   Result Value Ref Range    POCT Glucose 330 (H) 70 - 110 mg/dL   POCT glucose    Collection Time: 04/25/22  3:35 PM   Result Value Ref Range    POCT Glucose 338 (H) 70 - 110 mg/dL   POCT glucose    Collection Time: 04/25/22  9:59 PM   Result Value Ref Range    POCT Glucose 228 (H) 70 - 110 mg/dL   POCT glucose    Collection Time: 04/26/22  6:40 AM   Result Value Ref Range    POCT Glucose 198 (H) 70 - 110 mg/dL   POCT glucose    Collection Time: 04/26/22  7:21 AM   Result Value Ref Range    POCT Glucose 204 (H) 70 - 110 mg/dL        Lab Results   Component Value Date    [id number] Negative 04/05/2022       Recent Labs   Lab 04/23/22  0303 04/24/22 0417 04/25/22  0407   WBC 5.53 7.25 7.29   LYMPH 26.0  1.4 25.1  1.8 28.4  2.1   HGB 11.7* 11.4* 11.4*   HCT 35.1* 34.4* 34.4*    230 225       Recent Labs   Lab 04/23/22  0303 04/24/22  0417 04/25/22  0407    135* 136   K 4.1 4.5 4.5    102 101   CO2 25 27 26   BUN 15 16 21   CREATININE 0.8 0.9 0.9   * 259* 246*   CALCIUM 8.1* 8.2* 8.3*   MG 1.6 1.4* 1.5*   PHOS 3.2 3.1 2.9       Recent Labs   Lab 04/23/22  0303 04/24/22 0417 04/25/22  0407   ALKPHOS 111 105 103   ALT 38 32 36   AST 56* 33 52*   ALBUMIN 2.5* 2.3* 2.4*   PROT 5.6* 5.7* 5.5*   BILITOT 0.4 0.5 0.5          No results for input(s): DDIMER, FERRITIN, CRP, LDH, BNP, TROPONINI, CPK in the last 72 hours.    Invalid input(s): PROCALCITONIN    All labs within the last 24 hours were reviewed.     Microbiology:  Microbiology Results (last 7 days)       Procedure Component Value Units Date/Time    Urine culture [[id number]] Collected: 04/24/22 1219    Order Status: Completed Specimen: Urine Updated: 04/25/22 1247     Urine Culture, Routine Multiple organisms isolated. None in predominance.  Repeat if      clinically necessary.    Narrative:      Specimen Source->Urine    Culture,  Respiratory with Gram Stain [[id number]]     Order Status: No result Specimen: Respiratory     AFB Culture & Smear [[id number]]     Order Status: No result Specimen: Sputum               Imaging  ECG Results              EKG 12-lead (Final result)  Result time 04/12/22 12:51:09      Final result by Interface, Lab In [facility] (04/12/22 12:51:09)                   Narrative:    Test Reason : R73.9,    Vent. Rate : 118 BPM     Atrial Rate : 111 BPM     P-R Int : 000 ms          QRS Dur : 162 ms      QT Int : 436 ms       P-R-T Axes : 000 -14 052 degrees     QTc Int : 611 ms    Wide QRS rhythm  Right bundle branch block  ST elevation anterior leads differential includes anterior STEMI vs,  repolarization abnormality  Abnormal ECG  When compared with ECG of 05-APR-2022 11:41,  Wide QRS rhythm has replaced Sinus rhythm  Vent. rate has increased BY  48 BPM  Confirmed by Megha Stock MD ([id number]) on 4/12/2022 12:51:00 PM    Referred By: [email or web address]   SELF           Confirmed By:Megha Stock MD                                    Results for orders placed during the hospital encounter of 01/25/22    Echo    Interpretation Summary  · There is abnormal septal wall motion.  · The left ventricle is normal in size with low normal systolic function.  · The estimated ejection fraction is 50%.  · Normal left ventricular diastolic function.  · Mild left atrial enlargement.  · Normal right ventricular size with normal right ventricular systolic function.  · Mild mitral regurgitation.  · There are segmental left ventricular wall motion abnormalities.  · Mild tricuspid regurgitation.  · Elevated central venous pressure (15 mmHg).      CT Chest Without Contrast  Narrative: EXAMINATION:  CT CHEST WITHOUT CONTRAST    CLINICAL HISTORY:  pulmonary nodule, follow up;    TECHNIQUE:  Low dose axial images, sagittal and coronal reformations were obtained from the thoracic inlet to the lung bases. Contrast was not  administered.    COMPARISON:  Chest x-ray 04/11/2022, 03/05/2022; CT chest 12/01/2021, 09/23/2021, 04/14/2021.    FINDINGS:  SOFT TISSUES:  Unremarkable.    HEART AND MEDIASTINUM:  Several slightly prominent mediastinal lymph nodes, including 1.6 cm level 4R right lower paratracheal node (axial series 2, image 51), grossly stable compared to prior.  Multi-vessel coronary arterial calcific plaques ranging from mild to moderate-severe.  Scattered calcific plaques in the visualized aorta.    PLEURA:  Unremarkable.    LUNGS AND AIRWAYS:  Airways are patent.  Advanced bilateral centrilobular and paraseptal emphysema with subpleural reticulations and bandlike opacities in the bilateral upper lobes with interval improvement in interlobular septal thickening.    There are new ground-glass opacities in the dependent portions of the right upper lobe and the right lower lobe, consider aspiration.    There is a new 7 mm right lower lobe posterior segment nodule (4-331)    There are several stable to mildly improved, bilateral solid pulmonary nodules with index lesions as follows:    *Evolving appearance of a right upper lobe posterior segment cavitary lesion with internal dependent mass; the cavity demonstrates a thinner wall and has decreased in diameter.  There is adjacent contracture of parenchyma.  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  The central debris is decreased in size,, now measuring 1.0 cm (axial series 4, image 81), previously 2.0 cm.  *Triangular 1.4 cm nodule in the posterior segment of the right upper lobe (axial series 4, image 181) previously 1.8 cm  *Stable appearance of previously described 1.0 cm solid nodule in the posterior segment of the right upper lobe (4-277, prior exam 6-290).  *Stable 12 mm right upper lobe triangular opacity (4-203, prior 6-183).  *Stable 0.9 cm solid nodule in the superior segment of the right lower lobe (axial series 4, image 37), previously 0.9  "cm.  ESOPHAGUS:  Unremarkable.    UPPER ABDOMEN (limited):  Left renal cortical hypodensities, likely simple cysts.  Otherwise unremarkable.    BONES: Degenerative changes of the thoracic spine.  No fractures or focal osseous lesions.  Impression: 1. There are several stable pulmonary nodules as described above.  2. There are new ground-glass opacities in the dependent portions of the right lung, consider aspiration.  3. There is a new 7 mm right lower lobe posterior segment nodule.  This may reflect infectious/inflammatory etiology given other findings in the chest.  Clinical considerations will determine if further investigation of this finding is to be pursued.    Electronically signed by resident: Ernesto Lui  Date:    04/19/2022  Time:    08:25    Electronically signed by: Katt Rubi  Date:    04/19/2022  Time:    10:02      All imaging within the last 24 hours was reviewed.       Discharge Planning   ALLIE: 4/27/2022     Code Status: DNR   Is the patient medically ready for discharge?: No    Reason for patient still in hospital (select all that apply): Patient trending condition, Treatment, Consult recommendations, and Pending disposition  Discharge Plan A: Home health   Discharge Delays: None known at this time       Assessment/Plan:      * Pulmonary cavitary lesion  - As per CT, noted about a month ago  - Repeat CT Chest without contrast obtained on 4/19 revealed "an evolving appearance of a right upper lobe posterior segment cavitary lesion with internal dependent mass; the cavity demonstrates a thinner wall and has decreased in diameter.  There is adjacent contracture of parenchyma.  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  The central debris is decreased in size,, now measuring 1.0 cm (axial series 4, image 81), previously 2.0 cm, and various other nodules".   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  - 1/11/2022 Culture with MAC " pending susceptibilities   - 1/11/2022 2nd AFB culture with pending results  - Differential includes MAC, aspergillosis, chronic aspiration and less likely malignancy   - Pulmonary rec Augmentin x 7 days   - Induced sputum and send for cultures ordered, and AFB - has not been able to supply sample  - ID consulted and recommend repeat AFB sputum  - Poor candidate for surgical intervention given he is on triple therapy (eliquis + DAPT) for both afib and recent STEMI with LAUREN stent placed in January  - suspect his weight loss and recent decline worsened by underlying infection  - Repeat Chest CT in 3 months - 7/2022   - No indication for bronchoscopy at this time  - Can continue follow up with Dr. Louie outpatient.   - On RA and denies respiratory symptoms      Nausea  Numerous episodes of nausea (and vomiting at home) during this admit and with prior admits  -no history of peptic ulcer disease per the patient but definite reflux symptoms  -therapy with pantoprazole alone ineffective; symptoms persist with addition of Carafate.  -suspect is multifactorial with GERD and poor gastric emptying suspected  -he is not a candidate for Raglan due to history of seizure  -will try scheduled Zofran with meals to see if symptoms improve  -he is having regular bowel movements which are soft      Bacterial pneumonia  Complete 7 days of augmentin      Goals of care, counseling/discussion  - DNR as per ICU    Functional urinary incontinence  Remains requiring adult diapers  Post for residual ordered to assess for retention  Initiate time toileting for bladder training      Severe malnutrition  Nutrition consulted. Most recent weight and BMI monitored- Body mass index is 25.09 kg/m².       Malnutrition (Moderate to Severe)  Weight Loss (Malnutrition): greater than 7.5% in 3 months  Energy Intake (Malnutrition): less than 75% for greater than or equal to 1 month     -he is drinking more boost in eating food so will increased boost  supplements to 2 servings with each tray.         Measurements:  Wt Readings from Last 1 Encounters:   04/19/22 81.6 kg (179 lb 14.3 oz)   Body mass index is 25.09 kg/m².    Recommendations: Recommendation/Intervention: 1. Continue current Diabetic diet + ONS. 2. RD to monitor & follow-up.  Goals: Meet % EEN, EPN by RD f/u date    Patient has been screened and assessed by RD. RD will follow patient.    Discharge planning issues  PT/OT recommends skilled nursing facility ongoing therapy; patient acknowledges and agrees with the need for ongoing therapy but unfortunately he is required to pay a co-pay for further SNF days.  He feels his only options to go home with limited sitter assistance and home health; his son has made arrangements for him to go to his preferred facility, [facility] but the patient feels he cannot manage the financial implications of going there; patient's son is committed to getting patient to [facility] which is likely his safest disposition      Ketosis-prone diabetes mellitus  - Interval history and physical exam findings as described above  - DKA now resolved  - Working to optimize BG control  - Endocrine following and adjusting insulin regimen as needed  - SSI provided for corrective dosing  - DXTs as ordered  - Hypoglycemic protocol in effect  -Endocrine following due to erratic glucoses; notified of plan to discharge home in light of re-admits with DKA and eats regular diet at home    Focal seizures  - History of seizures treated with lacosamide.    - Continue home lacosamide    Coronary artery disease  - Stable  - Continue home regimen of aspirin, atorvastatin, clopidogrel, losartan, and metoprolol      Paroxysmal atrial fibrillation  - History of afib.    - Continue home regimen of amiodarone, apixaban, and metoprolol        Essential hypertension  - resume home meds as tolerates        VTE Risk Mitigation (From admission, onward)         Ordered     apixaban tablet 5  mg  2 times daily         04/20/22 1209     IP VTE HIGH RISK PATIENT  Once         04/11/22 2342     Place sequential compression device  Until discontinued         04/11/22 2138              I have assessed these findings virtually using a telemed platform and with assistance of the bedside nurse.      The attending portion of this evaluation, treatment, and documentation was performed per Komal Huynh MD via Telemedicine AudioVisual using the secure [employer] software platform with 2 way audio/video. The provider was located off-site and the patient is located in the hospital. The aforementioned video software was utilized to document the relevant history and physical exam    Komal Huynh MD  Department of Hospital Medicine   [facility] - Telemetry Stepdown

## 2022-04-28 LAB
POCT GLUCOSE: 112 MG/DL (ref 70–110)
POCT GLUCOSE: 147 MG/DL (ref 70–110)
POCT GLUCOSE: 161 MG/DL (ref 70–110)
POCT GLUCOSE: 261 MG/DL (ref 70–110)
POCT GLUCOSE: 313 MG/DL (ref 70–110)

## 2022-04-28 PROCEDURE — 97530 THERAPEUTIC ACTIVITIES: CPT

## 2022-04-28 PROCEDURE — 25000003 PHARM REV CODE 250: Performed by: INTERNAL MEDICINE

## 2022-04-28 PROCEDURE — 97535 SELF CARE MNGMENT TRAINING: CPT

## 2022-04-28 PROCEDURE — 94761 N-INVAS EAR/PLS OXIMETRY MLT: CPT

## 2022-04-28 PROCEDURE — 20600001 HC STEP DOWN PRIVATE ROOM

## 2022-04-28 PROCEDURE — 63600175 PHARM REV CODE 636 W HCPCS: Performed by: STUDENT IN AN ORGANIZED HEALTH CARE EDUCATION/TRAINING PROGRAM

## 2022-04-28 PROCEDURE — 99232 PR SUBSEQUENT HOSPITAL CARE,LEVL II: ICD-10-PCS | Mod: 95,,, | Performed by: INTERNAL MEDICINE

## 2022-04-28 PROCEDURE — 99232 SBSQ HOSP IP/OBS MODERATE 35: CPT | Mod: GC,,, | Performed by: INTERNAL MEDICINE

## 2022-04-28 PROCEDURE — 99232 SBSQ HOSP IP/OBS MODERATE 35: CPT | Mod: 95,,, | Performed by: INTERNAL MEDICINE

## 2022-04-28 PROCEDURE — 97116 GAIT TRAINING THERAPY: CPT

## 2022-04-28 PROCEDURE — 97110 THERAPEUTIC EXERCISES: CPT

## 2022-04-28 PROCEDURE — 99232 PR SUBSEQUENT HOSPITAL CARE,LEVL II: ICD-10-PCS | Mod: GC,,, | Performed by: INTERNAL MEDICINE

## 2022-04-28 RX ORDER — INSULIN ASPART 100 [IU]/ML
12 INJECTION, SOLUTION INTRAVENOUS; SUBCUTANEOUS
Status: DISCONTINUED | OUTPATIENT
Start: 2022-04-28 | End: 2022-04-29

## 2022-04-28 RX ORDER — MAGNESIUM SULFATE HEPTAHYDRATE 40 MG/ML
2 INJECTION, SOLUTION INTRAVENOUS ONCE
Status: COMPLETED | OUTPATIENT
Start: 2022-04-28 | End: 2022-04-29

## 2022-04-28 RX ORDER — IBUPROFEN 200 MG
24 TABLET ORAL
Status: DISCONTINUED | OUTPATIENT
Start: 2022-04-28 | End: 2022-05-03 | Stop reason: HOSPADM

## 2022-04-28 RX ORDER — IBUPROFEN 200 MG
16 TABLET ORAL
Status: DISCONTINUED | OUTPATIENT
Start: 2022-04-28 | End: 2022-05-03 | Stop reason: HOSPADM

## 2022-04-28 RX ORDER — GLUCAGON 1 MG
1 KIT INJECTION
Status: DISCONTINUED | OUTPATIENT
Start: 2022-04-28 | End: 2022-05-03 | Stop reason: HOSPADM

## 2022-04-28 RX ORDER — INSULIN ASPART 100 [IU]/ML
16 INJECTION, SOLUTION INTRAVENOUS; SUBCUTANEOUS
Status: DISCONTINUED | OUTPATIENT
Start: 2022-04-29 | End: 2022-05-01

## 2022-04-28 RX ADMIN — INSULIN ASPART 14 UNITS: 100 INJECTION, SOLUTION INTRAVENOUS; SUBCUTANEOUS at 07:04

## 2022-04-28 RX ADMIN — SENNOSIDES AND DOCUSATE SODIUM 1 TABLET: 50; 8.6 TABLET ORAL at 10:04

## 2022-04-28 RX ADMIN — METOPROLOL SUCCINATE 50 MG: 50 TABLET, EXTENDED RELEASE ORAL at 10:04

## 2022-04-28 RX ADMIN — SUCRALFATE 1 G: 1 SUSPENSION ORAL at 05:04

## 2022-04-28 RX ADMIN — SENNOSIDES AND DOCUSATE SODIUM 1 TABLET: 50; 8.6 TABLET ORAL at 08:04

## 2022-04-28 RX ADMIN — INSULIN ASPART 14 UNITS: 100 INJECTION, SOLUTION INTRAVENOUS; SUBCUTANEOUS at 12:04

## 2022-04-28 RX ADMIN — INSULIN ASPART 12 UNITS: 100 INJECTION, SOLUTION INTRAVENOUS; SUBCUTANEOUS at 05:04

## 2022-04-28 RX ADMIN — LACOSAMIDE 100 MG: 100 TABLET, FILM COATED ORAL at 08:04

## 2022-04-28 RX ADMIN — TAMSULOSIN HYDROCHLORIDE 0.4 MG: 0.4 CAPSULE ORAL at 10:04

## 2022-04-28 RX ADMIN — DOCUSATE SODIUM 50 MG: 50 CAPSULE, LIQUID FILLED ORAL at 10:04

## 2022-04-28 RX ADMIN — ATORVASTATIN CALCIUM 40 MG: 20 TABLET, FILM COATED ORAL at 10:04

## 2022-04-28 RX ADMIN — APIXABAN 5 MG: 5 TABLET, FILM COATED ORAL at 08:04

## 2022-04-28 RX ADMIN — APIXABAN 5 MG: 5 TABLET, FILM COATED ORAL at 10:04

## 2022-04-28 RX ADMIN — SUCRALFATE 1 G: 1 SUSPENSION ORAL at 12:04

## 2022-04-28 RX ADMIN — DOCUSATE SODIUM 50 MG: 50 CAPSULE, LIQUID FILLED ORAL at 08:04

## 2022-04-28 RX ADMIN — INSULIN ASPART 8 UNITS: 100 INJECTION, SOLUTION INTRAVENOUS; SUBCUTANEOUS at 07:04

## 2022-04-28 RX ADMIN — SUCRALFATE 1 G: 1 SUSPENSION ORAL at 08:04

## 2022-04-28 RX ADMIN — POLYETHYLENE GLYCOL 3350 17 G: 17 POWDER, FOR SOLUTION ORAL at 10:04

## 2022-04-28 RX ADMIN — CLOPIDOGREL 75 MG: 75 TABLET, FILM COATED ORAL at 10:04

## 2022-04-28 RX ADMIN — AMIODARONE HYDROCHLORIDE 200 MG: 200 TABLET ORAL at 10:04

## 2022-04-28 RX ADMIN — INSULIN ASPART 2 UNITS: 100 INJECTION, SOLUTION INTRAVENOUS; SUBCUTANEOUS at 05:04

## 2022-04-28 RX ADMIN — LACOSAMIDE 100 MG: 100 TABLET, FILM COATED ORAL at 10:04

## 2022-04-28 RX ADMIN — INSULIN ASPART 6 UNITS: 100 INJECTION, SOLUTION INTRAVENOUS; SUBCUTANEOUS at 12:04

## 2022-04-28 RX ADMIN — PANTOPRAZOLE SODIUM 40 MG: 40 TABLET, DELAYED RELEASE ORAL at 10:04

## 2022-04-28 NOTE — ASSESSMENT & PLAN NOTE
Key History and Diagnostic Findings  - Admit for recurrent DKA  - A1c of 9.7 on 2022 from 11.5 on 2022    - Home Regimen: Levemir 8 units daily, aspart 8 units TIDWM   - Weight based dosin kg x 0.5 = 41 TDD x 0.5 = 20 basal / 20 prandial   - 1700/TDD = 41 (estimated insulin sensitivity factor)   - 450/TDD = 11 (estimated starting carb ratio for prandial dosing)    - Glucose Goals: 140-180mg/dL  - 24 hour glucose trend: 50 - 223 tight   -  Variable diet %, inconsistent carbs with meals, diet indiscretions   -  Boosts with meals    Plan  -  Levemir 8 units BID  -  Aspart 14 units TIDWM (16 units was previsouly too much resulting in hypoglycemia, pending diet intake)  -  Moderate correction scale

## 2022-04-28 NOTE — PT/OT/SLP PROGRESS
"Occupational Therapy   Treatment    Name: Kamar Muñoz  MRN: [id number]  Admitting Diagnosis:  Pulmonary cavitary lesion       Recommendations:     Discharge Recommendations: nursing facility, skilled  Discharge Equipment Recommendations:   (TBD)  Barriers to discharge:  Decreased caregiver support    Assessment:     Kamar Muñoz is a 76 y.o. male with a medical diagnosis of Pulmonary cavitary lesion.  He presents with deficits in endurance, self-care tasks and mobility. Pt. Ambulated x 2 trials in room with RW and CGA.  Pt. Reported SOB at end of session. 02 sats taken and noted to be 95% with HR 44.  OT notifed nurse and charge.  Pt. Reported feeling better in supine.  Performance deficits affecting function are weakness, impaired endurance, gait instability, impaired cardiopulmonary response to activity, impaired self care skills, impaired functional mobilty.     Rehab Prognosis:  Good; patient would benefit from acute skilled OT services to address these deficits and reach maximum level of function.       Plan:     Patient to be seen 4 x/week to address the above listed problems via self-care/home management, therapeutic activities, therapeutic exercises  · Plan of Care Expires: 05/15/22  · Plan of Care Reviewed with: patient    Subjective   " I don't want to stand at the sink.  I have a chair in front of the sink at home to groom from."  Pain/Comfort:  · Pain Rating 1: 0/10  · Pain Rating Post-Intervention 1: 0/10    Objective:     Communicated with: nurse prior to session.  Patient found supine with   upon OT entry to room.    General Precautions: Standard, diabetic, fall   Orthopedic Precautions:N/A   Braces: N/A  Respiratory Status: Room air     Occupational Performance:     Bed Mobility:    · Patient completed Supine to Sit with independence     Functional Mobility/Transfers:  · Patient completed Sit <> Stand Transfer with stand by assistance  with  rolling walker   · Functional Mobility: Pt. " Ambulated x 2 trials with RW and CGA . Seated rest break taken between trials    Activities of Daily Living:  · Feeding:  set up A .  · Lower Body Dressing: minimum assistance to don/doff socks       [facility] 6 Click ADL: 20    Treatment & Education:  Pt. Performed 1 set 15 reps of BUE there ex for all major planes of motion as well as 20 ankle pumps    Patient left left sidelying with all lines intact, call button in reach and nurse and charge nurse notifiedEducation:      GOALS:   Multidisciplinary Problems     Occupational Therapy Goals        Problem: Occupational Therapy    Goal Priority Disciplines Outcome Interventions   Occupational Therapy Goal     OT, PT/OT Ongoing, Progressing    Description: Goals to be met by: 4/29/2022     Patient will increase functional independence with ADLs by performing:    UE Dressing with Set-up Assistance.  LE Dressing with Minimal Assistance using AD PRN.  Grooming while standing at sink with Stand-by Assistance.  Toileting from toilet with Supervision for hygiene and clothing management.   Step transfer with Supervision using AD PRN.  Toilet transfer to toilet with Stand-by Assistance using AD PRN.                     Time Tracking:     OT Date of Treatment: 04/28/22  OT Start Time: 1145  OT Stop Time: 1214  OT Total Time (min): 29 min    Billable Minutes:Self Care/Home Management 10  Therapeutic Exercise 19    OT/JUAN: OT          4/28/2022

## 2022-04-28 NOTE — SUBJECTIVE & OBJECTIVE
"Interval HPI:   Glucose trend  this morning  Basal detemir 8 units held last night due to glucose 69 and PO intake resulting in hyperglycemia this a.m.     Overnight events:none  Eatin% variable boost with meals  Nausea: No  Hypoglycemia and intervention: No  Fever: No  TPN and/or TF: No  If yes, type of TF/TPN and rate: na    /68 (BP Location: Right arm, Patient Position: Lying)   Pulse 60   Temp 97.4 °F (36.3 °C) (Oral)   Resp 18   Ht 5' 11" (1.803 m)   Wt 81.6 kg (179 lb 14.3 oz)   SpO2 95%   BMI 25.09 kg/m²     Labs Reviewed and Include    No results for input(s): GLU, CALCIUM, ALBUMIN, PROT, NA, K, CO2, CL, BUN, CREATININE, ALKPHOS, ALT, AST, BILITOT in the last 24 hours.  Lab Results   Component Value Date    WBC 6.86 2022    HGB 11.2 (L) 2022    HCT 35.1 (L) 2022    MCV 90 2022     2022     No results for input(s): TSH, FREET4 in the last 168 hours.  Lab Results   Component Value Date    HGBA1C 9.7 (H) 2022       Nutritional status:   Body mass index is 25.09 kg/m².  Lab Results   Component Value Date    ALBUMIN 2.4 (L) 2022    ALBUMIN 2.4 (L) 2022    ALBUMIN 2.3 (L) 2022     No results found for: PREALBUMIN    Estimated Creatinine Clearance: 81.1 mL/min (based on SCr of 0.8 mg/dL).    Accu-Checks  Recent Labs     22  1119 22  1521 22  2152 22  0720 22  1137 22  1527 22  2041 22  2259 22  0218 22  0725   POCTGLUCOSE 206* 169* 51* 223* 253* 160* 86 69* 147* 313*       Current Medications and/or Treatments Impacting Glycemic Control  Immunotherapy:    Immunosuppressants       None          Steroids:   Hormones (From admission, onward)                Start     Stop Route Frequency Ordered    22 1047  melatonin tablet 6 mg  (Medication Panel)         -- Oral Nightly PRN 22 0948          Pressors:    Autonomic Drugs (From admission, onward)                " None          Hyperglycemia/Diabetes Medications:   Antihyperglycemics (From admission, onward)                Start     Stop Route Frequency Ordered    04/27/22 1130  insulin aspart U-100 pen 14 Units         -- SubQ 3 times daily with meals 04/27/22 1046    04/25/22 2100  insulin detemir U-100 pen 8 Units         -- SubQ 2 times daily 04/25/22 1145    04/25/22 0931  insulin aspart U-100 pen 1-10 Units         -- SubQ Before meals & nightly PRN 04/25/22 0832    04/17/22 0111  insulin aspart U-100 pen 4 Units         -- SubQ With snacks 04/17/22 0012

## 2022-04-28 NOTE — PLAN OF CARE
[facility] - Telemetry Stepdown  Discharge Reassessment    Primary Care Provider: Basim Guerrero MD    Expected Discharge Date: 4/29/2022    Reassessment (most recent)     Discharge Reassessment - 04/28/22 1122        Discharge Reassessment    Assessment Type Discharge Planning Reassessment     Did the patient's condition or plan change since previous assessment? Yes     Discharge Plan discussed with: Patient     Communicated ALLIE with patient/caregiver Yes     Discharge Plan A Home;Home Health;Other   24/7 sitters and family support    Discharge Plan B Skilled Nursing Facility     DME Needed Upon Discharge  other (see comments)   TBD    Discharge Barriers Identified None     Why the patient remains in the hospital Requires continued medical care        Post-Acute Status    Post-Acute Authorization Placement;Home Health     Post-Acute Placement Status Patient declined/refused     Home Health Status Pending medical clearance/testing     Discharge Delays None known at this time               Pt accepted by [facility].  Referrals completed for [facility], [facility], and [facility].  SW provided pt's son and daughter with additional resources for [facility], [facility], and [facility].  Recommendations for cameras throughout the home.  SW will continue to follow and assist with d/c plan.     Oliva Lorenzo LMSW  PRN-  [facility]  Ext. [phone]

## 2022-04-28 NOTE — PROGRESS NOTES
[facility] - Telemetry Stepdown  Endocrinology  Progress Note    Admit Date: 4/11/2022     76 year old  male with T2DM, HTN, CAD, STEMI, HLD, paroxysmal Afib, CVA, seizures who presents for recurrent DKA.  He presented to [facility] ED on 4/11 with elevated blood glucose.  He was discharged from [facility] on 4/10 after being admitted for DKA on 4/5. Per family he was not feeling well after discharge to home and vomited several times on 4/11. Finger stick at home read High with unreadable blood glucose.  At this time daughter gave him 14 units of insulin and sublingual zofran. Other than malaise and nausea patient was not exhibiting any other signs/symptoms at home.    - In ER BG found to be 1015 with pCO2 6 and anion gap 35.  Hyperkalemic with K 7.0 and some EKG changes when compared to previous EKG.  Given calcium gluconate, and started on insulin gtt as well as subQ long acting insulin     - Endocrinology consulted for management of type 2 diabetes after resolution of DKA    - Spoke with daughter who states patient was discharged on 04/10/2022 with high glucose in the 400s which worsened after getting home and eating dinner; appropriate mealtime and correction insulin were given at that time. However, the next day patient's condition was worsening and they called EMS and his sugar was found to be in the thousands. She expresses concern that he cannot be care for adequately at home any longer and wishes to pursue skilled nursing facility.    Regarding Diabetes Mellitus:    Recurring episodes of DKA  Surgical Procedure and Date: NA  Diabetes diagnosis year: >20 years  Home Diabetes Medications:  During recent SNF was on Aspart 8 TID and glargine 8 units daily with sliding scale  How often checking glucose at home? >4 x day   Diabetes Complications include: Hyperglycemia, Hypoglycemia , and Diabetic peripheral neuropathy   Complicating diabetes co morbidities: History of CVA      Interval HPI:   Glucose trend  this  "morning  Basal detemir 8 units held last night due to glucose 69 and PO intake resulting in hyperglycemia this a.m.     Overnight events:none  Eatin% variable boost with meals  Nausea: No  Hypoglycemia and intervention: No  Fever: No  TPN and/or TF: No  If yes, type of TF/TPN and rate: na    /68 (BP Location: Right arm, Patient Position: Lying)   Pulse 60   Temp 97.4 °F (36.3 °C) (Oral)   Resp 18   Ht 5' 11" (1.803 m)   Wt 81.6 kg (179 lb 14.3 oz)   SpO2 95%   BMI 25.09 kg/m²     Labs Reviewed and Include    No results for input(s): GLU, CALCIUM, ALBUMIN, PROT, NA, K, CO2, CL, BUN, CREATININE, ALKPHOS, ALT, AST, BILITOT in the last 24 hours.  Lab Results   Component Value Date    WBC 6.86 2022    HGB 11.2 (L) 2022    HCT 35.1 (L) 2022    MCV 90 2022     2022     No results for input(s): TSH, FREET4 in the last 168 hours.  Lab Results   Component Value Date    HGBA1C 9.7 (H) 2022       Nutritional status:   Body mass index is 25.09 kg/m².  Lab Results   Component Value Date    ALBUMIN 2.4 (L) 2022    ALBUMIN 2.4 (L) 2022    ALBUMIN 2.3 (L) 2022     No results found for: PREALBUMIN    Estimated Creatinine Clearance: 81.1 mL/min (based on SCr of 0.8 mg/dL).    Accu-Checks  Recent Labs     22  1119 22  1521 22  2152 22  0720 22  1137 22  1527 22  2041 22  2259 22  0218 22  0725   POCTGLUCOSE 206* 169* 51* 223* 253* 160* 86 69* 147* 313*       Current Medications and/or Treatments Impacting Glycemic Control  Immunotherapy:    Immunosuppressants       None          Steroids:   Hormones (From admission, onward)                Start     Stop Route Frequency Ordered    22 1047  melatonin tablet 6 mg  (Medication Panel)         -- Oral Nightly PRN 22 0948          Pressors:    Autonomic Drugs (From admission, onward)                None          Hyperglycemia/Diabetes " Medications:   Antihyperglycemics (From admission, onward)                Start     Stop Route Frequency Ordered    22 1130  insulin aspart U-100 pen 14 Units         -- SubQ 3 times daily with meals 22 1046    22 2100  insulin detemir U-100 pen 8 Units         -- SubQ 2 times daily 22 1145    22 0931  insulin aspart U-100 pen 1-10 Units         -- SubQ Before meals & nightly PRN 22 0832    22 0111  insulin aspart U-100 pen 4 Units         -- SubQ With snacks 22 0012            ASSESSMENT and PLAN    Ketosis-prone diabetes mellitus  Key History and Diagnostic Findings  - Admit for recurrent DKA  - A1c of 9.7 on 2022 from 11.5 on 2022    - Home Regimen: Levemir 8 units daily, aspart 8 units TIDWM   - Weight based dosin kg x 0.5 = 41 TDD x 0.5 = 20 basal / 20 prandial   - 1700/TDD = 41 (estimated insulin sensitivity factor)   - 450/TDD = 11 (estimated starting carb ratio for prandial dosing)    - Glucose Goals: 140-180mg/dL  - 24 hour glucose trend: 50 - 223 tight   -  Variable diet %, inconsistent carbs with meals, diet indiscretions   -  Boosts with meals    Plan  -  Levemir 8 units BID  -  Aspart 14 units TIDWM (16 units was previsouly too much resulting in hypoglycemia, pending diet intake)  -  Moderate correction scale          Dyslipidemia associated with type 2 diabetes mellitus  - on atorvastatin 40 mg daily  - last lipid profile from 2022 shows LDL at 89, above goal of 70  - ensure compliance and repeat lipid profile outpatient in a month, if it continues to be above goal, would consider adding medication to therapy.      Coronary artery disease  - Strive to optimize glucose control        Stewart Rashid MD  Endocrinology  [facility] - Tele[facility] Stepdown

## 2022-04-28 NOTE — PLAN OF CARE
Problem: Pain Acute  Goal: Acceptable Pain Control and Functional Ability  Outcome: Ongoing, Progressing   Patient is alert, oriented and conscious. Vital signs checked and recorded. SPO2 maintain in RA. Medication given as per order. Mobilization done in hallway with assist. Accu check done. Wound care done. Will continue to monitor.

## 2022-04-28 NOTE — PROGRESS NOTES
[facility] - Telemetry [facility] Medicine  Telemedicine Progress Note    Patient Name: Kamar Muñoz  MRN: [id number]  Patient Class: IP- Inpatient   Admission Date: 4/11/2022  Length of Stay: 16 days  Attending Physician: Komla Huynh MD  Primary Care Provider: Basim Guerrero MD          Subjective:     Principal Problem:Pulmonary cavitary lesion        HPI:  Mr. Kamar Muñoz is a 76 y.o. male with a past medical history of HTN, Type 2 DM, CAD, STEMI, HLD, paroxysmal Afib, CVA, seizures and recurrent DKA.  He [place] [facility] [place] on [place]11 with elevated blood glucose.  He was discharged from [facility] on 4/10 after being admitted for DKA on 4/5.  At time of exam patient is alert but altered and unable to provide any history.  Spoke with patient's daughter who states she is a primary caregiver at home and obtained history as well as review of chart.  Per family he was not feeling well after discharge to home and vomited several times on 4/11. Unknown characteristics of emesis. Finger stick at home read High with unreadable blood glucose.  At this time daughter gave him 14 units of insulin and sublingual zofran. Other than malaise and nausea patient was not exhibiting any other signs/symptoms at home.  In ER BG found to be 1015 with pCO2 6 and anion gap 35.  Hyperkalemic with K 7.0 and some EKG changes when compared to previous EKG.  Given calcium gluconate, and started on insulin gtt as well as subQ long acting insulin.      Critical Care Medicine consulted for DKA and admitted to MICU.        Overview/Hospital Course:  Admitted to MICU on 4/11 for DKA with BG >1000, pCO2 6, AG 35, and hyperkalemia.  Given Calcium gluconate and started on insulin gtt in ED.  Hyperkalemia not improved on repeat labs and bolused with IV insulin.  Infectious workup sent and broad spectrum abx started.  4/12 potassium improving with insulin. 4/13 Gap closed, insulin gtt turned off and switched to subq insulin.  Endocrine consulted for insulin mgmt. He was stepped down to  4/14.    Since step down stable. Advanced diet. Endocrine following and titrating insulin. Poor po intake making it difficult to adjust insulin. CT chest with cavitary lesion, pulmonary and ID consulted. SNF once medically stable for dc.        This encounter was provided through telemedicine.  Patient was transferred to the telemedicine service on:  04/22/2022   The [facility] admitted 4/11/2022  8:55 PM.  Present with the patient at the time of the telemed/virtual assessment: Telepresenter    Interval History/Overnight Events:     Decreased nausea with zofran before meals but recurred last pm after dinner despite zofran; minimized symptoms when he was seen by GI; proceeding with GES as recommended by GI; was able to eat more with zofran    -he will be going home with home health at discharge    Review of Systems   Constitutional:  Positive for activity change and fatigue.   Respiratory:  Negative for cough.    Cardiovascular:  Negative for leg swelling.   Gastrointestinal:  Negative for diarrhea and vomiting.   Endocrine: Positive for polyuria.   Genitourinary:  Negative for dysuria.   Musculoskeletal:  Positive for myalgias (buttock pain making it difficulty to sit).   Neurological:  Positive for weakness.   Psychiatric/Behavioral:  Negative for behavioral problems and confusion.       Inpatient Medications:  Scheduled Meds:   amiodarone  200 mg Oral Daily    apixaban  5 mg Oral BID    atorvastatin  40 mg Oral Daily    clopidogreL  75 mg Oral Daily    docusate sodium  50 mg Oral BID    insulin aspart U-100  14 Units Subcutaneous TIDWM    insulin detemir U-100  8 Units Subcutaneous BID    lacosamide  100 mg Oral Q12H    metoprolol succinate  50 mg Oral Daily    ondansetron  4 mg Oral TID WM    pantoprazole  40 mg Oral Daily    polyethylene glycol  17 g Oral Daily    senna-docusate 8.6-50 mg  1 tablet Oral BID     sucralfate  1 g Oral QID (AC & HS)    tamsulosin  0.4 mg Oral Daily     Continuous Infusions:  PRN Meds:.acetaminophen, calcium carbonate, cloNIDine, dextrose 10%, diphenhydrAMINE, glucagon (human recombinant), hydrALAZINE, insulin aspart U-100, insulin aspart U-100, melatonin, ondansetron, prochlorperazine, senna, simethicone, sodium chloride 0.9%      Objective:     Temp:  [97 °F (36.1 °C)-98.3 °F (36.8 °C)] 98.3 °F (36.8 °C)  Pulse:  [54-86] 63  Resp:  [16-18] 18  SpO2:  [90 %-99 %] 94 %  BP: (104-131)/(54-80) 104/54      Intake/Output Summary (Last 24 hours) at 4/27/2022 1141  Last data filed at 4/26/2022 1715  Gross per 24 hour   Intake 480 ml   Output --   Net 480 ml          Body mass index is 25.09 kg/m².    Physical Exam  Vitals and nursing note reviewed.   Constitutional:       General: He is not in acute distress.     Appearance: He is normal weight. He is ill-appearing.   HENT:      Head: Normocephalic and atraumatic.      Right Ear: Hearing normal.      Left Ear: Hearing normal.      Nose: Nose normal.   Eyes:      General: No scleral icterus.        Right eye: No discharge.         Left eye: No discharge.      Extraocular Movements: Extraocular movements intact.   Cardiovascular:      Rate and Rhythm: Normal rate.   Pulmonary:      Effort: Pulmonary effort is normal. No accessory muscle usage or respiratory distress.   Skin:     Findings: No rash.   Neurological:      General: No focal deficit present.      Mental Status: He is alert and oriented to person, place, and time.      Cranial Nerves: No cranial nerve deficit.      Motor: No weakness.   Psychiatric:         Attention and Perception: Attention normal.         Mood and Affect: Affect is flat.         Speech: Speech normal.         Behavior: Behavior is cooperative.        Labs:  Recent Results (from the past 24 hour(s))   POCT glucose    Collection Time: 04/26/22  3:21 PM   Result Value Ref Range    POCT Glucose 169 (H) 70 - 110 mg/dL   POCT  glucose    Collection Time: 04/26/22  9:52 PM   Result Value Ref Range    POCT Glucose 51 (L) 70 - 110 mg/dL   CBC Auto Differential    Collection Time: 04/27/22  4:59 AM   Result Value Ref Range    WBC 6.86 3.90 - 12.70 K/uL    RBC 3.89 (L) 4.60 - 6.20 M/uL    Hemoglobin 11.2 (L) 14.0 - 18.0 g/dL    Hematocrit 35.1 (L) 40.0 - 54.0 %    MCV 90 82 - 98 fL    MCH 28.8 27.0 - 31.0 pg    MCHC 31.9 (L) 32.0 - 36.0 g/dL    RDW 15.8 (H) 11.5 - 14.5 %    Platelets 243 150 - 450 K/uL    MPV 9.3 9.2 - 12.9 fL    Immature Granulocytes 0.3 0.0 - 0.5 %    Gran # (ANC) 3.6 1.8 - 7.7 K/uL    Immature Grans (Abs) 0.02 0.00 - 0.04 K/uL    Lymph # 2.2 1.0 - 4.8 K/uL    Mono # 0.6 0.3 - 1.0 K/uL    Eos # 0.4 0.0 - 0.5 K/uL    Baso # 0.03 0.00 - 0.20 K/uL    nRBC 0 0 /100 WBC    Gran % 53.1 38.0 - 73.0 %    Lymph % 31.5 18.0 - 48.0 %    Mono % 8.6 4.0 - 15.0 %    Eosinophil % 6.1 0.0 - 8.0 %    Basophil % 0.4 0.0 - 1.9 %    Differential Method Automated    Comprehensive Metabolic Panel    Collection Time: 04/27/22  4:59 AM   Result Value Ref Range    Sodium 135 (L) 136 - 145 mmol/L    Potassium 4.0 3.5 - 5.1 mmol/L    Chloride 100 95 - 110 mmol/L    CO2 27 23 - 29 mmol/L    Glucose 135 (H) 70 - 110 mg/dL    BUN 20 8 - 23 mg/dL    Creatinine 0.8 0.5 - 1.4 mg/dL    Calcium 8.4 (L) 8.7 - 10.5 mg/dL    Total Protein 5.6 (L) 6.0 - 8.4 g/dL    Albumin 2.4 (L) 3.5 - 5.2 g/dL    Total Bilirubin 0.5 0.1 - 1.0 mg/dL    Alkaline Phosphatase 97 55 - 135 U/L    AST 47 (H) 10 - 40 U/L    ALT 38 10 - 44 U/L    Anion Gap 8 8 - 16 mmol/L    eGFR if African American >60.0 >60 mL/min/1.73 m^2    eGFR if non African American >60.0 >60 mL/min/1.73 m^2   Magnesium    Collection Time: 04/27/22  4:59 AM   Result Value Ref Range    Magnesium 1.5 (L) 1.6 - 2.6 mg/dL   Phosphorus    Collection Time: 04/27/22  4:59 AM   Result Value Ref Range    Phosphorus 4.1 2.7 - 4.5 mg/dL   POCT glucose    Collection Time: 04/27/22  7:20 AM   Result Value Ref Range    POCT  Glucose 223 (H) 70 - 110 mg/dL   POCT glucose    Collection Time: 04/27/22 11:37 AM   Result Value Ref Range    POCT Glucose 253 (H) 70 - 110 mg/dL        Lab Results   Component Value Date    GVC31DYMXGOQ Negative 04/05/2022       Recent Labs   Lab 04/24/22 0417 04/25/22 0407 04/27/22 0459   WBC 7.25 7.29 6.86   LYMPH 25.1  1.8 28.4  2.1 31.5  2.2   HGB 11.4* 11.4* 11.2*   HCT 34.4* 34.4* 35.1*    225 243       Recent Labs   Lab 04/24/22 0417 04/25/22 0407 04/27/22 0459   * 136 135*   K 4.5 4.5 4.0    101 100   CO2 27 26 27   BUN 16 21 20   CREATININE 0.9 0.9 0.8   * 246* 135*   CALCIUM 8.2* 8.3* 8.4*   MG 1.4* 1.5* 1.5*   PHOS 3.1 2.9 4.1       Recent Labs   Lab 04/24/22 0417 04/25/22 0407 04/27/22  0459   ALKPHOS 105 103 97   ALT 32 36 38   AST 33 52* 47*   ALBUMIN 2.3* 2.4* 2.4*   PROT 5.7* 5.5* 5.6*   BILITOT 0.5 0.5 0.5          No results for input(s): DDIMER, FERRITIN, CRP, LDH, BNP, TROPONINI, CPK in the last 72 hours.    Invalid input(s): PROCALCITONIN    All labs within the last 24 hours were reviewed.     Microbiology:  Microbiology Results (last 7 days)       Procedure Component Value Units Date/Time    Urine culture [[id number]] Collected: 04/24/22 1219    Order Status: Completed Specimen: Urine Updated: 04/25/22 1247     Urine Culture, Routine Multiple organisms isolated. None in predominance.  Repeat if      clinically necessary.    Narrative:      Specimen Source->Urine    Culture, Respiratory with Gram Stain [[id number]]     Order Status: No result Specimen: Respiratory     AFB Culture & Smear [[id number]]     Order Status: No result Specimen: Sputum               Imaging  ECG Results              EKG 12-lead (Final result)  Result time 04/12/22 12:51:09      Final result by Interface, Lab In [facility] (04/12/22 12:51:09)                   Narrative:    Test Reason : R73.9,    Vent. Rate : 118 BPM     Atrial Rate : 111 BPM     P-R Int : 000 ms          QRS Dur : 162  ms      QT Int : 436 ms       P-R-T Axes : 000 -14 052 degrees     QTc Int : 611 ms    Wide QRS rhythm  Right bundle branch block  ST elevation anterior leads differential includes anterior STEMI vs,  repolarization abnormality  Abnormal ECG  When compared with ECG of 05-APR-2022 11:41,  Wide QRS rhythm has replaced Sinus rhythm  Vent. rate has increased BY  48 BPM  Confirmed by Megha Stock MD ([id number]) on 4/12/2022 12:51:00 PM    Referred By: [email or web address]   SELF           Confirmed By:Megha Stock MD                                    Results for orders placed during the hospital encounter of 01/25/22    Echo    Interpretation Summary  · There is abnormal septal wall motion.  · The left ventricle is normal in size with low normal systolic function.  · The estimated ejection fraction is 50%.  · Normal left ventricular diastolic function.  · Mild left atrial enlargement.  · Normal right ventricular size with normal right ventricular systolic function.  · Mild mitral regurgitation.  · There are segmental left ventricular wall motion abnormalities.  · Mild tricuspid regurgitation.  · Elevated central venous pressure (15 mmHg).      CT Chest Without Contrast  Narrative: EXAMINATION:  CT CHEST WITHOUT CONTRAST    CLINICAL HISTORY:  pulmonary nodule, follow up;    TECHNIQUE:  Low dose axial images, sagittal and coronal reformations were obtained from the thoracic inlet to the lung bases. Contrast was not administered.    COMPARISON:  Chest x-ray 04/11/2022, 03/05/2022; CT chest 12/01/2021, 09/23/2021, 04/14/2021.    FINDINGS:  SOFT TISSUES:  Unremarkable.    HEART AND MEDIASTINUM:  Several slightly prominent mediastinal lymph nodes, including 1.6 cm level 4R right lower paratracheal node (axial series 2, image 51), grossly stable compared to prior.  Multi-vessel coronary arterial calcific plaques ranging from mild to moderate-severe.  Scattered calcific plaques in the visualized aorta.    PLEURA:  Unremarkable.    LUNGS  AND AIRWAYS:  Airways are patent.  Advanced bilateral centrilobular and paraseptal emphysema with subpleural reticulations and bandlike opacities in the bilateral upper lobes with interval improvement in interlobular septal thickening.    There are new ground-glass opacities in the dependent portions of the right upper lobe and the right lower lobe, consider aspiration.    There is a new 7 mm right lower lobe posterior segment nodule (4-331)    There are several stable to mildly improved, bilateral solid pulmonary nodules with index lesions as follows:    *Evolving appearance of a right upper lobe posterior segment cavitary lesion with internal dependent mass; the cavity demonstrates a thinner wall and has decreased in diameter.  There is adjacent contracture of parenchyma.  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  The central debris is decreased in size,, now measuring 1.0 cm (axial series 4, image 81), previously 2.0 cm.  *Triangular 1.4 cm nodule in the posterior segment of the right upper lobe (axial series 4, image 181) previously 1.8 cm  *Stable appearance of previously described 1.0 cm solid nodule in the posterior segment of the right upper lobe (4-277, prior exam 6-290).  *Stable 12 mm right upper lobe triangular opacity (4-203, prior 6-183).  *Stable 0.9 cm solid nodule in the superior segment of the right lower lobe (axial series 4, image 37), previously 0.9 cm.  ESOPHAGUS:  Unremarkable.    UPPER ABDOMEN (limited):  Left renal cortical hypodensities, likely simple cysts.  Otherwise unremarkable.    BONES: Degenerative changes of the thoracic spine.  No fractures or focal osseous lesions.  Impression: 1. There are several stable pulmonary nodules as described above.  2. There are new ground-glass opacities in the dependent portions of the right lung, consider aspiration.  3. There is a new 7 mm right lower lobe posterior segment nodule.  This may reflect  "infectious/inflammatory etiology given other findings in the chest.  Clinical considerations will determine if further investigation of this finding is to be pursued.    Electronically signed by resident: Ernesto Lui  Date:    04/19/2022  Time:    08:25    Electronically signed by: Katt Rubi  Date:    04/19/2022  Time:    10:02      All imaging within the last 24 hours was reviewed.       Discharge Planning   ALLIE: 4/29/2022     Code Status: DNR   Is the patient medically ready for discharge?: No    Reason for patient still in hospital (select all that apply): Patient trending condition, Treatment, Consult recommendations, and Pending disposition  Discharge Plan A: Home health   Discharge Delays: None known at this time       Assessment/Plan:      * Pulmonary cavitary lesion  - As per CT, noted about a month ago  - Repeat CT Chest without contrast obtained on 4/19 revealed "an evolving appearance of a right upper lobe posterior segment cavitary lesion with internal dependent mass; the cavity demonstrates a thinner wall and has decreased in diameter.  There is adjacent contracture of parenchyma.  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  The central debris is decreased in size,, now measuring 1.0 cm (axial series 4, image 81), previously 2.0 cm, and various other nodules".   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  - 1/11/2022 Culture with MAC pending susceptibilities   - 1/11/2022 2nd AFB culture with pending results  - Differential includes MAC, aspergillosis, chronic aspiration and less likely malignancy   - Pulmonary rec Augmentin x 7 days   - Induced sputum and send for cultures ordered, and AFB - has not been able to supply sample  - ID consulted and recommend repeat AFB sputum  - Poor candidate for surgical intervention given he is on triple therapy (eliquis + DAPT) for both afib and recent STEMI with LAUREN stent placed in January  - " suspect his weight loss and recent decline worsened by underlying infection  - Repeat Chest CT in 3 months - 7/2022   - No indication for bronchoscopy at this time  - Can continue follow up with Dr. Louie outpatient.   - On RA and denies respiratory symptoms      Nausea  Numerous episodes of nausea (and vomiting at home) during this admit and with prior admits  -no history of peptic ulcer disease per the patient but definite reflux symptoms  -therapy with pantoprazole alone ineffective; symptoms persist with addition of Carafate.  -suspect is multifactorial with GERD and poor gastric emptying suspected  -he is not a candidate for Raglan due to history of seizure  -currently requiring scheduled Zofran with meals to improve symptoms  -he is having regular bowel movements which are soft  -discussed with GI and proceeding with GES      Bacterial pneumonia  Complete 7 days of augmentin      Goals of care, counseling/discussion  - DNR as per ICU    Functional urinary incontinence  Remains requiring adult diapers  Post for residual ordered to assess for retention  Initiate time toileting for bladder training      Severe malnutrition  Nutrition consulted. Most recent weight and BMI monitored- Body mass index is 25.09 kg/m².       Malnutrition (Moderate to Severe)  Weight Loss (Malnutrition): greater than 7.5% in 3 months  Energy Intake (Malnutrition): less than 75% for greater than or equal to 1 month     -he is drinking more boost in eating food so will increased boost supplements to 2 servings with each tray.         Measurements:  Wt Readings from Last 1 Encounters:   04/19/22 81.6 kg (179 lb 14.3 oz)   Body mass index is 25.09 kg/m².    Recommendations: Recommendation/Intervention: 1. Continue current Diabetic diet + ONS. 2. RD to monitor & follow-up.  Goals: Meet % EEN, EPN by RD f/u date    Patient has been screened and assessed by RD. RD will follow patient.    Discharge planning issues  PT/OT recommends  skilled nursing facility ongoing therapy; patient acknowledges and agrees with the need for ongoing therapy but unfortunately he is required to pay a co-pay for further SNF days.  He feels his only options to go home with limited sitter assistance and home health; his son has made arrangements for him to go to his preferred facility, [facility] but the patient feels he cannot manage the financial implications of going there; patient's son is committed to getting patient to [facility] which is likely his safest disposition      Ketosis-prone diabetes mellitus  - Interval history and physical exam findings as described above  - DKA now resolved  - Working to optimize BG control  - Endocrine following and adjusting insulin regimen as needed  - SSI provided for corrective dosing  - DXTs as ordered  - Hypoglycemic protocol in effect  -Endocrine following due to erratic glucoses; notified of plan to discharge home in light of re-admits with DKA and eats regular diet at home    Focal seizures  - History of seizures treated with lacosamide.    - Continue home lacosamide    Coronary artery disease  - Stable  - Continue home regimen of aspirin, atorvastatin, clopidogrel, losartan, and metoprolol      Paroxysmal atrial fibrillation  - History of afib.    - Continue home regimen of amiodarone, apixaban, and metoprolol        Essential hypertension  - resume home meds as tolerates        VTE Risk Mitigation (From admission, onward)         Ordered     apixaban tablet 5 mg  2 times daily         04/20/22 1209     IP VTE HIGH RISK PATIENT  Once         04/11/22 2342     Place sequential compression device  Until discontinued         04/11/22 2138                    I have assessed these findings virtually using a telemed platform and with assistance of the bedside nurse.          The attending portion of this evaluation, treatment, and documentation was performed per Komal Huynh MD via Telemedicine AudioVisual  using the secure [employer] software platform with 2 way audio/video. The provider was located off-site and the patient is located in the hospital. The aforementioned video software was utilized to document the relevant history and physical exam    Komal Huynh MD  Department of Hospital Medicine   [facility] - Telemetry Stepdown

## 2022-04-28 NOTE — PT/OT/SLP PROGRESS
"Physical Therapy Treatment    Patient Name:  Kamar Muñoz   MRN:  [id number]    Recommendations:     Discharge Recommendations:  nursing facility, skilled   Discharge Equipment Recommendations: other (see comments) (tbd)   Barriers to discharge: Decreased caregiver support    Assessment:     Kamar Muñoz is a 76 y.o. male admitted with a medical diagnosis of Pulmonary cavitary lesion.  He presents with the following impairments/functional limitations:  weakness, impaired endurance, gait instability, impaired balance, impaired cardiopulmonary response to activity, impaired self care skills, impaired functional mobilty, decreased safety awareness. Kamar Muñoz would benefit from acute PT intervention to address listed functional deficits, provide patient/caregiver education, reduce fall risk, and maximize (I) and safety with functional mobility.    Pt participated well with PT on this date. Pt found sitting EOB and agreeable to ambulation in ding. Pt able to walk ~100 ft with RW and CGA. No instances of LOB on this date, but Pt remains a fall risk as he is frail at baseline. Reports that he will unlikely participate tomorrow 2/2 "nuclear medicine," but that he would appreciate being able to work with therapy over weekend. Pt will continue to benefit from acute PT services in order to maximize safety and (I) with functional mobility.    After hospital discharge, pt would benefit from SNF to continue addressing therapy impairments.    Rehab Prognosis: Good; patient would benefit from acute skilled PT services to address these deficits and reach maximum level of function.      Plan:     During this hospitalization, patient to be seen 4 x/week to address the identified rehab impairments via gait training, therapeutic activities, therapeutic exercises, neuromuscular re-education and progress toward the following goals:    · Plan of Care Expires:  05/04/22    This plan of care has been discussed with the " patient/caregiver, who was included in its development and is in agreement with the identified goals and treatment plan.     Subjective     Communicated with RN prior to session.  Patient agreeable to participate.     Chief Complaint: none noted  Patient/Family Comments/goals: to become more independent  Pt pain prior to session: 0/10  Pt pain following session: 0/10    Objective:     Patient found HOB elevated with telemetry  upon PT entry to room.    General Precautions: Standard, diabetic, fall   Orthopedic Precautions:N/A   Braces: N/A       Functional Mobility:    Bed Mobility:  · Supine to Sit: Stand-by Assistance  · Sit to Supine: Stand-by Assistance    Transfers:   · Sit to Stand Transfer: Contact Guard Assistance  from EOB with RW AD              Gait:  · Patient received gait training ~100 feet with Contact Guard Assistance and rolling walker  · Gait Assessment: decreased speed, step length, swing through, heel strike, forward flexed posture, and downward gaze.   · PT providing verbal and tactile cues for improved upright posture, gaze direction, AD management, and gait fluidity.     Balance:  · Static Sit: Supervision at EOB x ~8 minutes  · Static Stand: Contact-Guard Assist with Rolling walker      Therapeutic Activities/Exercises     Pt educated on importance of proper AD use to maintain safety with OOB mobility     Education regarding importance of upright positioning to promote cardiopulmonary health.   Education on importance of consistent OOB mobility to improve functional mobility    Patient educated on the importance of early mobility to prevent functional decline during hospital stay    Patient was instructed to utilize staff assistance for mobility/transfers.  Patient was educated on PT POC and all questions answered within PT scope of practice.    Patient/caregiver able to verbalize understanding; will follow-up with pt/caregiver during current admit for additional questions/concerns within  scope of practice.     White board updated.     AM-PAC 6 CLICK MOBILITY  Total Score:17     Patient left HOB elevated with call button in reach and RN notified.        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    Hyperlipidemia     Hypertension     Kidney stone     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 8/2/2012    STEMI involving right coronary artery 1/9/2022    Type II or unspecified type diabetes mellitus with neurological manifestations, uncontrolled(250.62) 3/8/2013    Urinary tract infection        Past Surgical History:   Procedure Laterality Date    BACK SURGERY      CATARACT EXTRACTION W/  INTRAOCULAR LENS IMPLANT Right     Per Dr Romero note 11/2018    COLONOSCOPY N/A 1/28/2019    Procedure: COLONOSCOPY Suprep;  Surgeon: Anh Johnson MD;  Location: [facility] ENDO;  Service: Endoscopy;  Laterality: N/A;    EYE SURGERY      HERNIA REPAIR      LEFT HEART CATHETERIZATION Left 1/9/2022    Procedure: CATHETERIZATION, HEART, LEFT;  Surgeon: Will Hurst III, MD;  Location: [facility] CATH LAB/EP;  Service: Cardiology;  Laterality: Left;    renal stones      SHOULDER OPEN ROTATOR CUFF REPAIR         GOALS:   Multidisciplinary Problems     Physical Therapy Goals        Problem: Physical Therapy    Goal Priority Disciplines Outcome Goal Variances Interventions   Physical Therapy Goal     PT, PT/OT Ongoing, Progressing     Description: Goals to met by 4/29/2022     1. Supine to sit with Modified [place]  2. Sit to supine with Modified [place]  3. Sit to stand transfer with Modified [place]  4. Bed to chair transfer with Modified [place] using Rolling Walker  5. Gait  x 150 feet with Stand-by Assistance using Rolling Walker   6. Ascend/Descend 6 inch curb step with Contact  Guard Assistance using LRAD.  7. Lower extremity exercise program x15 reps per Instruction, with supervision in order to facilitate improvement in functional independence                     Time Tracking:     PT Received On: 04/28/22  PT Start Time: 1430     PT Stop Time: 1456  PT Total Time (min): 26 min     Billable Minutes: Gait Training 15 and Therapeutic Activity 9      Hiram Stevenson, PT  04/28/2022

## 2022-04-28 NOTE — ASSESSMENT & PLAN NOTE
Numerous episodes of nausea (and vomiting at home) during this admit and with prior admits  -no history of peptic ulcer disease per the patient but definite reflux symptoms  -therapy with pantoprazole alone ineffective; symptoms persist with addition of Carafate.  -suspect is multifactorial with GERD and poor gastric emptying suspected  -he is not a candidate for Raglan due to history of seizure  -currently requiring scheduled Zofran with meals to improve symptoms  -he is having regular bowel movements which are soft  -discussed with GI and proceeding with GES

## 2022-04-28 NOTE — SUBJECTIVE & OBJECTIVE
Telemedicine  This service was provided by Virtual Visit.    Patient was transferred to [facility] on:  04/22/2022     Chief Complaint   Patient presents with    Altered Mental Status     The patient location is: [id number] A   Admitted 4/11/2022  8:55 PM  Present with the patient at the time of the telemed/virtual assessment: Telepresenter    Interval History / Events Overnight:   The patient is able to provide adequate history. Additional history was obtained from past medical records. No significant events reported by Nursing.  Patient complains of nausea, better with scheduled Zofran. Symptoms have been unchanged since yesterday. Associated symptoms include: fatigue. Symptoms are stable.     Lab test(s) reviewed: hypomagnesemia    Review of Systems   Constitutional:  Negative for fever.   Respiratory:  Negative for shortness of breath.      Objective:     Vital Signs (Most Recent):  Temp: 98 °F (36.7 °C) (04/28/22 0750)  Pulse: 62 (04/28/22 0750)  Resp: 18 (04/28/22 0750)  BP: (!) 166/72 (04/28/22 0750)  SpO2: (!) 93 % (04/28/22 0750)   Vital Signs (24h Range):  Temp:  [96.3 °F (35.7 °C)-98.3 °F (36.8 °C)] 98 °F (36.7 °C)  Pulse:  [60-64] 62  Resp:  [16-18] 18  SpO2:  [93 %-98 %] 93 %  BP: (104-166)/(54-72) 166/72     Weight: 81.6 kg (179 lb 14.3 oz)  Body mass index is 25.09 kg/m².    Intake/Output Summary (Last 24 hours) at 4/28/2022 1057  Last data filed at 4/27/2022 1600  Gross per 24 hour   Intake 700 ml   Output 250 ml   Net 450 ml      Physical Exam  Constitutional:       General: He is not in acute distress.     Appearance: Normal appearance.   Eyes:      General: Lids are normal. No scleral icterus.        Right eye: No discharge.         Left eye: No discharge.      Conjunctiva/sclera: Conjunctivae normal.   Neck:      Trachea: Phonation normal.   Cardiovascular:      Rate and Rhythm: Normal rate.      Comments: Monitor / Vital signs reviewed at time of visit  Pulmonary:      Effort:  Pulmonary effort is normal. No tachypnea, accessory muscle usage or respiratory distress.   Abdominal:      General: There is no distension.   Skin:     Coloration: Skin is not cyanotic.   Neurological:      Mental Status: He is alert. He is not disoriented.   Psychiatric:         Attention and Perception: Attention normal.         Mood and Affect: Affect normal.         Behavior: Behavior is cooperative.       Significant Labs:   Recent Labs   Lab 12/29/21  0810 01/26/22  1512 04/05/22  1138   HGBA1C 12.3* 11.5* 9.7*       Recent Labs   Lab 04/28/22  1136 04/28/22  1544 04/28/22  2032   POCTGLUCOSE 261* 161* 112*     Recent Labs   Lab 04/24/22 0417 04/25/22 0407 04/27/22  0459   WBC 7.25 7.29 6.86   HGB 11.4* 11.4* 11.2*   HCT 34.4* 34.4* 35.1*    225 243     Recent Labs   Lab 04/24/22 0417 04/25/22 0407 04/27/22  0459   GRAN 62.1  4.5 58.2  4.2 53.1  3.6   LYMPH 25.1  1.8 28.4  2.1 31.5  2.2   MONO 6.8  0.5 7.0  0.5 8.6  0.6   EOS 0.4 0.4 0.4     Recent Labs   Lab 04/24/22 0417 04/25/22 0407 04/27/22  0459   * 136 135*   K 4.5 4.5 4.0    101 100   CO2 27 26 27   BUN 16 21 20   CREATININE 0.9 0.9 0.8   * 246* 135*   CALCIUM 8.2* 8.3* 8.4*   ALBUMIN 2.3* 2.4* 2.4*   MG 1.4* 1.5* 1.5*   PHOS 3.1 2.9 4.1     Recent Labs   Lab 04/24/22 0417 04/25/22 0407 04/27/22  0459   ALKPHOS 105 103 97   ALT 32 36 38   AST 33 52* 47*   PROT 5.7* 5.5* 5.6*   BILITOT 0.5 0.5 0.5     Recent Labs   Lab 02/19/22 2242 02/20/22  0046 03/03/22  2113 03/05/22  1037 03/05/22  1939 03/07/22  2043 04/11/22  2236 04/12/22  0207 04/12/22  0618 04/12/22  1051 04/12/22  1324   PROCAL 0.93*  --   --   --   --   --   --  1.18*  --   --   --    LACTATE  --    < >  --    < > 1.0  --    < > 8.2* 4.2* 3.2* 2.0   DDIMER 0.84*   < > 0.50*  --  0.50* 0.43  --   --   --   --   --    FERRITIN 564*   < > 300  --  354* 334*  --   --   --   --   --    SEDRATE 92*  --   --   --   --   --   --   --   --   --   --     <  > = values in this interval not displayed.     Results for orders placed or performed in visit on 05/05/14   Vitamin D   Result Value Ref Range    Vit D, 25-Hydroxy 24 (L) 30 - 96 ng/mL     SARS-CoV2 (COVID-19) Qualitative PCR (no units)   Date Value   03/21/2022 Not Detected   03/17/2022 Not Detected   03/14/2022 Not Detected   03/08/2022 Not Detected     POC Rapid COVID (no units)   Date Value   04/05/2022 Negative   02/17/2022 Positive (A)   01/25/2022 Negative   01/12/2022 Negative   01/09/2022 Negative       ECG Results              EKG 12-lead (Final result)  Result time 04/12/22 12:51:09      Final result by Interface, Lab In [facility] (04/12/22 12:51:09)                   Narrative:    Test Reason : R73.9,    Vent. Rate : 118 BPM     Atrial Rate : 111 BPM     P-R Int : 000 ms          QRS Dur : 162 ms      QT Int : 436 ms       P-R-T Axes : 000 -14 052 degrees     QTc Int : 611 ms    Wide QRS rhythm  Right bundle branch block  ST elevation anterior leads differential includes anterior STEMI vs,  repolarization abnormality  Abnormal ECG  When compared with ECG of 05-APR-2022 11:41,  Wide QRS rhythm has replaced Sinus rhythm  Vent. rate has increased BY  48 BPM  Confirmed by Megha Stock MD ([id number]) on 4/12/2022 12:51:00 PM    Referred By: [email or web address]   SELF           Confirmed By:Megha Stock MD                                    Results for orders placed during the hospital encounter of 01/25/22    Echo    Interpretation Summary  · There is abnormal septal wall motion.  · The left ventricle is normal in size with low normal systolic function.  · The estimated ejection fraction is 50%.  · Normal left ventricular diastolic function.  · Mild left atrial enlargement.  · Normal right ventricular size with normal right ventricular systolic function.  · Mild mitral regurgitation.  · There are segmental left ventricular wall motion abnormalities.  · Mild tricuspid regurgitation.  · Elevated central venous pressure  (15 mmHg).      CT Chest Without Contrast  Narrative: EXAMINATION:  CT CHEST WITHOUT CONTRAST    CLINICAL HISTORY:  pulmonary nodule, follow up;    TECHNIQUE:  Low dose axial images, sagittal and coronal reformations were obtained from the thoracic inlet to the lung bases. Contrast was not administered.    COMPARISON:  Chest x-ray 04/11/2022, 03/05/2022; CT chest 12/01/2021, 09/23/2021, 04/14/2021.    FINDINGS:  SOFT TISSUES:  Unremarkable.    HEART AND MEDIASTINUM:  Several slightly prominent mediastinal lymph nodes, including 1.6 cm level 4R right lower paratracheal node (axial series 2, image 51), grossly stable compared to prior.  Multi-vessel coronary arterial calcific plaques ranging from mild to moderate-severe.  Scattered calcific plaques in the visualized aorta.    PLEURA:  Unremarkable.    LUNGS AND AIRWAYS:  Airways are patent.  Advanced bilateral centrilobular and paraseptal emphysema with subpleural reticulations and bandlike opacities in the bilateral upper lobes with interval improvement in interlobular septal thickening.    There are new ground-glass opacities in the dependent portions of the right upper lobe and the right lower lobe, consider aspiration.    There is a new 7 mm right lower lobe posterior segment nodule (4-331)    There are several stable to mildly improved, bilateral solid pulmonary nodules with index lesions as follows:    *Evolving appearance of a right upper lobe posterior segment cavitary lesion with internal dependent mass; the cavity demonstrates a thinner wall and has decreased in diameter.  There is adjacent contracture of parenchyma.  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  The central debris is decreased in size,, now measuring 1.0 cm (axial series 4, image 81), previously 2.0 cm.  *Triangular 1.4 cm nodule in the posterior segment of the right upper lobe (axial series 4, image 181) previously 1.8 cm  *Stable appearance of previously described  1.0 cm solid nodule in the posterior segment of the right upper lobe (4-277, prior exam 6-290).  *Stable 12 mm right upper lobe triangular opacity (4-203, prior 6-183).  *Stable 0.9 cm solid nodule in the superior segment of the right lower lobe (axial series 4, image 37), previously 0.9 cm.  ESOPHAGUS:  Unremarkable.    UPPER ABDOMEN (limited):  Left renal cortical hypodensities, likely simple cysts.  Otherwise unremarkable.    BONES: Degenerative changes of the thoracic spine.  No fractures or focal osseous lesions.  Impression: 1. There are several stable pulmonary nodules as described above.  2. There are new ground-glass opacities in the dependent portions of the right lung, consider aspiration.  3. There is a new 7 mm right lower lobe posterior segment nodule.  This may reflect infectious/inflammatory etiology given other findings in the chest.  Clinical considerations will determine if further investigation of this finding is to be pursued.    Electronically signed by resident: Ernesto Lui  Date:    04/19/2022  Time:    08:25    Electronically signed by: Katt Rubi  Date:    04/19/2022  Time:    10:02      Labs and Imaging within the last 24 hours listed above were reviewed.       Diet: Diet diabetic [facility]; 2000 Calorie  Diet NPO  Significant LDAs:   IV Access Type: Peripheral  Urinary Catheter Indication if present: Patient Does Not Have Urinary Catheter  Other Lines/Tubes/Drains:         Goals of Care:    Previous admission:  4/5/22  Likely prognosis:  Fair  Code Status: DNR  Comfort Only: No  Hospice: No  Goals at discharge: remain at home, with physician follow-up    Discharge Planning   ALLIE: 4/30/2022     Code Status: DNR   Is the patient medically ready for discharge?: No    Reason for patient still in hospital (select all that apply): Patient trending condition, Imaging, and Consult recommendations  Discharge Plan A: Home, Home Health, Other (24/7 sitters and family support)    Discharge Delays: None known at this time

## 2022-04-29 LAB
ALBUMIN SERPL BCP-MCNC: 2.6 G/DL (ref 3.5–5.2)
ALP SERPL-CCNC: 97 U/L (ref 55–135)
ALT SERPL W/O P-5'-P-CCNC: 54 U/L (ref 10–44)
ANION GAP SERPL CALC-SCNC: 10 MMOL/L (ref 8–16)
AST SERPL-CCNC: 88 U/L (ref 10–40)
BASOPHILS # BLD AUTO: 0.05 K/UL (ref 0–0.2)
BASOPHILS NFR BLD: 0.9 % (ref 0–1.9)
BILIRUB SERPL-MCNC: 0.4 MG/DL (ref 0.1–1)
BUN SERPL-MCNC: 17 MG/DL (ref 8–23)
CALCIUM SERPL-MCNC: 8.5 MG/DL (ref 8.7–10.5)
CHLORIDE SERPL-SCNC: 103 MMOL/L (ref 95–110)
CO2 SERPL-SCNC: 25 MMOL/L (ref 23–29)
CREAT SERPL-MCNC: 0.8 MG/DL (ref 0.5–1.4)
DIFFERENTIAL METHOD: ABNORMAL
EOSINOPHIL # BLD AUTO: 0.4 K/UL (ref 0–0.5)
EOSINOPHIL NFR BLD: 6.4 % (ref 0–8)
ERYTHROCYTE [DISTWIDTH] IN BLOOD BY AUTOMATED COUNT: 15.9 % (ref 11.5–14.5)
EST. GFR  (AFRICAN AMERICAN): >60 ML/MIN/1.73 M^2
EST. GFR  (NON AFRICAN AMERICAN): >60 ML/MIN/1.73 M^2
GLUCOSE SERPL-MCNC: 40 MG/DL (ref 70–110)
HCT VFR BLD AUTO: 35.5 % (ref 40–54)
HGB BLD-MCNC: 11.6 G/DL (ref 14–18)
IMM GRANULOCYTES # BLD AUTO: 0.01 K/UL (ref 0–0.04)
IMM GRANULOCYTES NFR BLD AUTO: 0.2 % (ref 0–0.5)
LYMPHOCYTES # BLD AUTO: 1.7 K/UL (ref 1–4.8)
LYMPHOCYTES NFR BLD: 31.5 % (ref 18–48)
MAGNESIUM SERPL-MCNC: 1.9 MG/DL (ref 1.6–2.6)
MCH RBC QN AUTO: 30.1 PG (ref 27–31)
MCHC RBC AUTO-ENTMCNC: 32.7 G/DL (ref 32–36)
MCV RBC AUTO: 92 FL (ref 82–98)
MONOCYTES # BLD AUTO: 0.6 K/UL (ref 0.3–1)
MONOCYTES NFR BLD: 11.4 % (ref 4–15)
NEUTROPHILS # BLD AUTO: 2.7 K/UL (ref 1.8–7.7)
NEUTROPHILS NFR BLD: 49.6 % (ref 38–73)
NRBC BLD-RTO: 0 /100 WBC
PHOSPHATE SERPL-MCNC: 2.6 MG/DL (ref 2.7–4.5)
PLATELET # BLD AUTO: 227 K/UL (ref 150–450)
PMV BLD AUTO: 9.5 FL (ref 9.2–12.9)
POCT GLUCOSE: 113 MG/DL (ref 70–110)
POCT GLUCOSE: 227 MG/DL (ref 70–110)
POCT GLUCOSE: 274 MG/DL (ref 70–110)
POCT GLUCOSE: 348 MG/DL (ref 70–110)
POCT GLUCOSE: 49 MG/DL (ref 70–110)
POCT GLUCOSE: 72 MG/DL (ref 70–110)
POTASSIUM SERPL-SCNC: 3.7 MMOL/L (ref 3.5–5.1)
PROT SERPL-MCNC: 5.8 G/DL (ref 6–8.4)
RBC # BLD AUTO: 3.86 M/UL (ref 4.6–6.2)
SODIUM SERPL-SCNC: 138 MMOL/L (ref 136–145)
WBC # BLD AUTO: 5.46 K/UL (ref 3.9–12.7)

## 2022-04-29 PROCEDURE — 36415 COLL VENOUS BLD VENIPUNCTURE: CPT | Performed by: INTERNAL MEDICINE

## 2022-04-29 PROCEDURE — 25000003 PHARM REV CODE 250: Performed by: INTERNAL MEDICINE

## 2022-04-29 PROCEDURE — 80053 COMPREHEN METABOLIC PANEL: CPT | Performed by: INTERNAL MEDICINE

## 2022-04-29 PROCEDURE — 20600001 HC STEP DOWN PRIVATE ROOM

## 2022-04-29 PROCEDURE — 84100 ASSAY OF PHOSPHORUS: CPT | Performed by: INTERNAL MEDICINE

## 2022-04-29 PROCEDURE — 99232 PR SUBSEQUENT HOSPITAL CARE,LEVL II: ICD-10-PCS | Mod: GC,,, | Performed by: INTERNAL MEDICINE

## 2022-04-29 PROCEDURE — 83735 ASSAY OF MAGNESIUM: CPT | Performed by: INTERNAL MEDICINE

## 2022-04-29 PROCEDURE — 99232 SBSQ HOSP IP/OBS MODERATE 35: CPT | Mod: GC,,, | Performed by: INTERNAL MEDICINE

## 2022-04-29 PROCEDURE — 85025 COMPLETE CBC W/AUTO DIFF WBC: CPT | Performed by: INTERNAL MEDICINE

## 2022-04-29 PROCEDURE — 99232 SBSQ HOSP IP/OBS MODERATE 35: CPT | Mod: 95,,, | Performed by: INTERNAL MEDICINE

## 2022-04-29 PROCEDURE — 99232 PR SUBSEQUENT HOSPITAL CARE,LEVL II: ICD-10-PCS | Mod: 95,,, | Performed by: INTERNAL MEDICINE

## 2022-04-29 PROCEDURE — 63600175 PHARM REV CODE 636 W HCPCS: Performed by: INTERNAL MEDICINE

## 2022-04-29 RX ORDER — INSULIN ASPART 100 [IU]/ML
10 INJECTION, SOLUTION INTRAVENOUS; SUBCUTANEOUS
Status: DISCONTINUED | OUTPATIENT
Start: 2022-04-29 | End: 2022-04-30

## 2022-04-29 RX ADMIN — MAGNESIUM SULFATE 2 G: 2 INJECTION INTRAVENOUS at 12:04

## 2022-04-29 RX ADMIN — AMIODARONE HYDROCHLORIDE 200 MG: 200 TABLET ORAL at 04:04

## 2022-04-29 RX ADMIN — APIXABAN 5 MG: 5 TABLET, FILM COATED ORAL at 08:04

## 2022-04-29 RX ADMIN — LACOSAMIDE 100 MG: 100 TABLET, FILM COATED ORAL at 08:04

## 2022-04-29 RX ADMIN — ATORVASTATIN CALCIUM 40 MG: 20 TABLET, FILM COATED ORAL at 04:04

## 2022-04-29 RX ADMIN — INSULIN ASPART 3 UNITS: 100 INJECTION, SOLUTION INTRAVENOUS; SUBCUTANEOUS at 08:04

## 2022-04-29 RX ADMIN — SUCRALFATE 1 G: 1 SUSPENSION ORAL at 06:04

## 2022-04-29 RX ADMIN — INSULIN ASPART 10 UNITS: 100 INJECTION, SOLUTION INTRAVENOUS; SUBCUTANEOUS at 04:04

## 2022-04-29 RX ADMIN — CLOPIDOGREL 75 MG: 75 TABLET, FILM COATED ORAL at 04:04

## 2022-04-29 RX ADMIN — INSULIN ASPART 8 UNITS: 100 INJECTION, SOLUTION INTRAVENOUS; SUBCUTANEOUS at 04:04

## 2022-04-29 RX ADMIN — TAMSULOSIN HYDROCHLORIDE 0.4 MG: 0.4 CAPSULE ORAL at 04:04

## 2022-04-29 RX ADMIN — METOPROLOL SUCCINATE 50 MG: 50 TABLET, EXTENDED RELEASE ORAL at 04:04

## 2022-04-29 RX ADMIN — Medication 6 MG: at 08:04

## 2022-04-29 RX ADMIN — DEXTROSE 125 ML: 10 SOLUTION INTRAVENOUS at 03:04

## 2022-04-29 RX ADMIN — SUCRALFATE 1 G: 1 SUSPENSION ORAL at 08:04

## 2022-04-29 RX ADMIN — SUCRALFATE 1 G: 1 SUSPENSION ORAL at 04:04

## 2022-04-29 RX ADMIN — PANTOPRAZOLE SODIUM 40 MG: 40 TABLET, DELAYED RELEASE ORAL at 04:04

## 2022-04-29 NOTE — ASSESSMENT & PLAN NOTE
PT/OT recommends skilled nursing facility for ongoing therapy; patient acknowledges and agrees with the need for ongoing therapy but unfortunately he is required to pay a co-pay for further SNF days.    He feels his only options to go home with limited sitter assistance and home health; his son has made arrangements for him to go to his preferred facility, [facility] but the patient feels he cannot manage the financial implications of going there; patient's son is committed to getting patient to [facility] which is likely his safest disposition

## 2022-04-29 NOTE — PROGRESS NOTES
[facility] - Telemetry [facility] Medicine  Telemedicine Progress Note    Patient Name: Kamar Muñoz  MRN: [id number]  Patient Class: IP- Inpatient   Admission Date: 4/11/2022  Length of Stay: 17 days  Attending Physician: Tiffany Awad MD  Primary Care Provider: Basim Guerrero MD      Subjective:     Principal Problem:Pulmonary cavitary lesion    HPI:  Mr. Kamar Muñoz is a 76 y.o. male with a past medical history of HTN, Type 2 DM, CAD, STEMI, HLD, paroxysmal Afib, CVA, seizures and recurrent DKA.  [facility] on 4/11 with elevated blood glucose.  He was discharged [facility] on 4/10 after being admitted for DKA on 4/5.  At time of exam patient is alert but altered and unable to provide any history.  Spoke with patient's daughter who states she is a primary caregiver at home and obtained history as well as review of chart.  Per family he was not feeling well after discharge to home and vomited several times on 4/11. Unknown characteristics of emesis. Finger stick at home read High with unreadable blood glucose.  At this time daughter gave him 14 units of insulin and sublingual zofran. Other than malaise and nausea patient was not exhibiting any other signs/symptoms at home.  In ER BG found to be 1015 with pCO2 6 and anion gap 35.  Hyperkalemic with K 7.0 and some EKG changes when compared to previous EKG.  Given calcium gluconate, and started on insulin gtt as well as subQ long acting insulin.      Critical Care Medicine consulted for DKA and admitted to MICU.        Overview/Hospital Course:  Admitted to MICU on 4/11 for DKA with BG >1000, pCO2 6, AG 35, and hyperkalemia.  Given Calcium gluconate and started on insulin gtt in ED.  Hyperkalemia not improved on repeat labs and bolused with IV insulin.  Infectious workup sent and broad spectrum abx started.  4/12 potassium improving with insulin. 4/13 Gap closed, insulin gtt turned off and switched to subq insulin. Endocrine  consulted for insulin mgmt. He was stepped down to  4/14.    Since step down stable. Advanced diet. Endocrine following and titrating insulin. Poor po intake making it difficult to adjust insulin. CT chest with cavitary lesion, pulmonary and ID consulted. SNF once medically stable for dc.      Telemedicine  This service was provided by Virtual Visit.    Patient was transferred to [facility] Medicine on:  04/22/2022     Chief Complaint   Patient presents with    Altered Mental Status     The patient location is: [id number] A   Admitted 4/11/2022  8:55 PM  Present with the patient at the time of the telemed/virtual assessment: Telepresenter    Interval History / Events Overnight:   The patient is able to provide adequate history. Additional history was obtained from past medical records. No significant events reported by Nursing.  Patient complains of nausea, better with scheduled Zofran. Symptoms have been unchanged since yesterday. Associated symptoms include: fatigue. Symptoms are stable.     Lab test(s) reviewed: hypomagnesemia    Review of Systems   Constitutional:  Negative for fever.   Respiratory:  Negative for shortness of breath.      Objective:     Vital Signs (Most Recent):  Temp: 98 °F (36.7 °C) (04/28/22 0750)  Pulse: 62 (04/28/22 0750)  Resp: 18 (04/28/22 0750)  BP: (!) 166/72 (04/28/22 0750)  SpO2: (!) 93 % (04/28/22 0750)   Vital Signs (24h Range):  Temp:  [96.3 °F (35.7 °C)-98.3 °F (36.8 °C)] 98 °F (36.7 °C)  Pulse:  [60-64] 62  Resp:  [16-18] 18  SpO2:  [93 %-98 %] 93 %  BP: (104-166)/(54-72) 166/72     Weight: 81.6 kg (179 lb 14.3 oz)  Body mass index is 25.09 kg/m².    Intake/Output Summary (Last 24 hours) at 4/28/2022 1057  Last data filed at 4/27/2022 1600  Gross per 24 hour   Intake 700 ml   Output 250 ml   Net 450 ml      Physical Exam  Constitutional:       General: He is not in acute distress.     Appearance: Normal appearance.   Eyes:      General: Lids are normal. No scleral  icterus.        Right eye: No discharge.         Left eye: No discharge.      Conjunctiva/sclera: Conjunctivae normal.   Neck:      Trachea: Phonation normal.   Cardiovascular:      Rate and Rhythm: Normal rate.      Comments: Monitor / Vital signs reviewed at time of visit  Pulmonary:      Effort: Pulmonary effort is normal. No tachypnea, accessory muscle usage or respiratory distress.   Abdominal:      General: There is no distension.   Skin:     Coloration: Skin is not cyanotic.   Neurological:      Mental Status: He is alert. He is not disoriented.   Psychiatric:         Attention and Perception: Attention normal.         Mood and Affect: Affect normal.         Behavior: Behavior is cooperative.       Significant Labs:   Recent Labs   Lab 12/29/21  0810 01/26/22  1512 04/05/22  1138   HGBA1C 12.3* 11.5* 9.7*       Recent Labs   Lab 04/28/22  1136 04/28/22  1544 04/28/22 2032   POCTGLUCOSE 261* 161* 112*     Recent Labs   Lab 04/24/22 0417 04/25/22 0407 04/27/22  0459   WBC 7.25 7.29 6.86   HGB 11.4* 11.4* 11.2*   HCT 34.4* 34.4* 35.1*    225 243     Recent Labs   Lab 04/24/22 0417 04/25/22 0407 04/27/22  0459   GRAN 62.1  4.5 58.2  4.2 53.1  3.6   LYMPH 25.1  1.8 28.4  2.1 31.5  2.2   MONO 6.8  0.5 7.0  0.5 8.6  0.6   EOS 0.4 0.4 0.4     Recent Labs   Lab 04/24/22 0417 04/25/22 0407 04/27/22  0459   * 136 135*   K 4.5 4.5 4.0    101 100   CO2 27 26 27   BUN 16 21 20   CREATININE 0.9 0.9 0.8   * 246* 135*   CALCIUM 8.2* 8.3* 8.4*   ALBUMIN 2.3* 2.4* 2.4*   MG 1.4* 1.5* 1.5*   PHOS 3.1 2.9 4.1     Recent Labs   Lab 04/24/22 0417 04/25/22 0407 04/27/22  0459   ALKPHOS 105 103 97   ALT 32 36 38   AST 33 52* 47*   PROT 5.7* 5.5* 5.6*   BILITOT 0.5 0.5 0.5     Recent Labs   Lab 02/19/22  2242 02/20/22  0046 03/03/22  2113 03/05/22  1037 03/05/22  1939 03/07/22  2043 04/11/22  2236 04/12/22  0207 04/12/22  0618 04/12/22  1051 04/12/22  1324   PROCAL 0.93*  --   --   --   --    --   --  1.18*  --   --   --    LACTATE  --    < >  --    < > 1.0  --    < > 8.2* 4.2* 3.2* 2.0   DDIMER 0.84*   < > 0.50*  --  0.50* 0.43  --   --   --   --   --    FERRITIN 564*   < > 300  --  354* 334*  --   --   --   --   --    SEDRATE 92*  --   --   --   --   --   --   --   --   --   --     < > = values in this interval not displayed.     Results for orders placed or performed in visit on 05/05/14   Vitamin D   Result Value Ref Range    Vit D, 25-Hydroxy 24 (L) 30 - 96 ng/mL     SARS-CoV2 (COVID-19) Qualitative PCR (no units)   Date Value   03/21/2022 Not Detected   03/17/2022 Not Detected   03/14/2022 Not Detected   03/08/2022 Not Detected     POC Rapid COVID (no units)   Date Value   04/05/2022 Negative   02/17/2022 Positive (A)   01/25/2022 Negative   01/12/2022 Negative   01/09/2022 Negative       ECG Results              EKG 12-lead (Final result)  Result time 04/12/22 12:51:09      Final result by Interface, Lab In [facility] (04/12/22 12:51:09)                   Narrative:    Test Reason : R73.9,    Vent. Rate : 118 BPM     Atrial Rate : 111 BPM     P-R Int : 000 ms          QRS Dur : 162 ms      QT Int : 436 ms       P-R-T Axes : 000 -14 052 degrees     QTc Int : 611 ms    Wide QRS rhythm  Right bundle branch block  ST elevation anterior leads differential includes anterior STEMI vs,  repolarization abnormality  Abnormal ECG  When compared with ECG of 05-APR-2022 11:41,  Wide QRS rhythm has replaced Sinus rhythm  Vent. rate has increased BY  48 BPM  Confirmed by Megha Stock MD ([id number]) on 4/12/2022 12:51:00 PM    Referred By: [email or web address]   SELF           Confirmed By:Megha Stock MD                                    Results for orders placed during the hospital encounter of 01/25/22    Echo    Interpretation Summary  · There is abnormal septal wall motion.  · The left ventricle is normal in size with low normal systolic function.  · The estimated ejection fraction is 50%.  · Normal left ventricular  diastolic function.  · Mild left atrial enlargement.  · Normal right ventricular size with normal right ventricular systolic function.  · Mild mitral regurgitation.  · There are segmental left ventricular wall motion abnormalities.  · Mild tricuspid regurgitation.  · Elevated central venous pressure (15 mmHg).      CT Chest Without Contrast  Narrative: EXAMINATION:  CT CHEST WITHOUT CONTRAST    CLINICAL HISTORY:  pulmonary nodule, follow up;    TECHNIQUE:  Low dose axial images, sagittal and coronal reformations were obtained from the thoracic inlet to the lung bases. Contrast was not administered.    COMPARISON:  Chest x-ray 04/11/2022, 03/05/2022; CT chest 12/01/2021, 09/23/2021, 04/14/2021.    FINDINGS:  SOFT TISSUES:  Unremarkable.    HEART AND MEDIASTINUM:  Several slightly prominent mediastinal lymph nodes, including 1.6 cm level 4R right lower paratracheal node (axial series 2, image 51), grossly stable compared to prior.  Multi-vessel coronary arterial calcific plaques ranging from mild to moderate-severe.  Scattered calcific plaques in the visualized aorta.    PLEURA:  Unremarkable.    LUNGS AND AIRWAYS:  Airways are patent.  Advanced bilateral centrilobular and paraseptal emphysema with subpleural reticulations and bandlike opacities in the bilateral upper lobes with interval improvement in interlobular septal thickening.    There are new ground-glass opacities in the dependent portions of the right upper lobe and the right lower lobe, consider aspiration.    There is a new 7 mm right lower lobe posterior segment nodule (4-331)    There are several stable to mildly improved, bilateral solid pulmonary nodules with index lesions as follows:    *Evolving appearance of a right upper lobe posterior segment cavitary lesion with internal dependent mass; the cavity demonstrates a thinner wall and has decreased in diameter.  There is adjacent contracture of parenchyma.  This may represent evolving appearance of  saphrophytic aspergilloma.  The central debris is decreased in size,, now measuring 1.0 cm (axial series 4, image 81), previously 2.0 cm.  *Triangular 1.4 cm nodule in the posterior segment of the right upper lobe (axial series 4, image 181) previously 1.8 cm  *Stable appearance of previously described 1.0 cm solid nodule in the posterior segment of the right upper lobe (4-277, prior exam 6-290).  *Stable 12 mm right upper lobe triangular opacity (4-203, prior 6-183).  *Stable 0.9 cm solid nodule in the superior segment of the right lower lobe (axial series 4, image 37), previously 0.9 cm.  ESOPHAGUS:  Unremarkable.    UPPER ABDOMEN (limited):  Left renal cortical hypodensities, likely simple cysts.  Otherwise unremarkable.    BONES: Degenerative changes of the thoracic spine.  No fractures or focal osseous lesions.  Impression: 1. There are several stable pulmonary nodules as described above.  2. There are new ground-glass opacities in the dependent portions of the right lung, consider aspiration.  3. There is a new 7 mm right lower lobe posterior segment nodule.  This may reflect infectious/inflammatory etiology given other findings in the chest.  Clinical considerations will determine if further investigation of this finding is to be pursued.    Electronically signed by resident: Ernesto Lui  Date:    04/19/2022  Time:    08:25    Electronically signed by: Katt Rubi  Date:    04/19/2022  Time:    10:02      Labs and Imaging within the last 24 hours listed above were reviewed.       Diet: Diet diabetic [facility]; 2000 Calorie  Diet NPO  Significant LDAs:   IV Access Type: Peripheral  Urinary Catheter Indication if present: Patient Does Not Have Urinary Catheter  Other Lines/Tubes/Drains:         Goals of Care:    Previous admission:  4/5/22  Likely prognosis:  Fair  Code Status: DNR  Comfort Only: No  Hospice: No  Goals at discharge: remain at home, with physician follow-up    Discharge Planning  "  ALLIE: 4/30/2022     Code Status: DNR   Is the patient medically ready for discharge?: No    Reason for patient still in hospital (select all that apply): Patient trending condition, Imaging, and Consult recommendations  Discharge Plan A: Home, Home Health, Other (24/7 sitters and family support)   Discharge Delays: None known at this time    Assessment/Plan:      * Pulmonary cavitary lesion  - As per CT, noted about a month ago  - Repeat CT Chest without contrast obtained on 4/19 revealed "an evolving appearance of a right upper lobe posterior segment cavitary lesion with internal dependent mass; the cavity demonstrates a thinner wall and has decreased in diameter.  There is adjacent contracture of parenchyma.  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  The central debris is decreased in size,, now measuring 1.0 cm (axial series 4, image 81), previously 2.0 cm, and various other nodules".   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  - 1/11/2022 Culture with MAC pending susceptibilities   - 1/11/2022 2nd AFB culture with pending results  - Differential includes MAC, aspergillosis, chronic aspiration and less likely malignancy   - Pulmonary rec Augmentin x 7 days   - Induced sputum and send for cultures ordered, and AFB - has not been able to supply sample  - ID consulted and recommend repeat AFB sputum  - Poor candidate for surgical intervention given he is on triple therapy (eliquis + DAPT) for both afib and recent STEMI with LAUREN stent placed in January  - suspect his weight loss and recent decline worsened by underlying infection  - Repeat Chest CT in 3 months - 7/2022   - No indication for bronchoscopy at this time  - Can continue follow up with Dr. Louie outpatient.   - On RA and denies respiratory symptoms      Nausea  Numerous episodes of nausea (and vomiting at home) during this admit and with prior admits  -no history of peptic ulcer " disease per the patient but definite reflux symptoms  -therapy with pantoprazole alone ineffective; symptoms persist with addition of Carafate.  -suspect is multifactorial with GERD and poor gastric emptying suspected  -he is not a candidate for Reglan due to history of seizure  -currently requiring scheduled Zofran with meals to improve symptoms  -he is having regular bowel movements which are soft  -discussed with GI and proceeding with GES    Bacterial pneumonia  Completed 7 days of Augmentin    Goals of care, counseling/discussion  - DNR as per ICU    Functional urinary incontinence  Remains requiring adult diapers  Post-void residual ordered to assess for retention  Initiate timed toileting for bladder training    Severe malnutrition  Nutrition consulted. Most recent weight and BMI monitored- Body mass index is 25.09 kg/m².      -he is drinking more Boost than eating food so increased Boost supplements to 2 servings with each tray.     Measurements:  Wt Readings from Last 1 Encounters:   04/27/22 81.6 kg (179 lb 14.3 oz)   Body mass index is 25.09 kg/m².    Recommendations: Recommendation/Intervention: 1. Continue current Diabetic diet + ONS. 2. RD to monitor & follow-up.  Goals: Meet % EEN, EPN by RD f/u date    Patient has been screened and assessed by RD. RD will follow patient.    Discharge planning issues  PT/OT recommends skilled nursing facility for ongoing therapy; patient acknowledges and agrees with the need for ongoing therapy but unfortunately he is required to pay a co-pay for further SNF days.    He feels his only options to go home with limited sitter assistance and home health; his son has made arrangements for him to go to his preferred facility, [facility] but the patient feels he cannot manage the financial implications of going there; patient's son is committed to getting patient to [facility] which is likely his safest disposition    Ketosis-prone diabetes mellitus  - DKA  now resolved  - Endocrine following and adjusting insulin regimen as needed  - SSI provided for corrective dosing  - Hypoglycemic protocol in effect  -Endocrine following due to erratic glucoses; notified of plan to discharge home in light of re-admits with DKA and eats regular diet at home    Focal seizures  - History of seizures treated with lacosamide.    - Continue home lacosamide    Coronary artery disease  - Stable  - Continue home regimen of aspirin, atorvastatin, clopidogrel, losartan, and metoprolol    Paroxysmal atrial fibrillation  - History of A-fib.    - Continue home regimen of amiodarone, apixaban, and metoprolol    Essential hypertension  - resume home meds as tolerated        Active Hospital Problems    Diagnosis  POA    *Pulmonary cavitary lesion [J98.4]  Yes    Nausea [R11.0]  Yes    Dyslipidemia associated with type 2 diabetes mellitus [E11.69, E78.5]  Yes    Bacterial pneumonia [J15.9]  Yes    Goals of care, counseling/discussion [Z71.89]  Not Applicable    Functional urinary incontinence [R39.81]  Yes    Severe malnutrition [E43]  Yes    Discharge planning issues [Z02.9]  Not Applicable    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]  Yes    Focal seizures [R56.9]  Yes     Chronic    Coronary artery disease [I25.10]  Yes    Paroxysmal atrial fibrillation [I48.0]  Yes     Chronic    Essential hypertension [I10]  Yes     Chronic      Resolved Hospital Problems    Diagnosis Date Resolved POA    Encephalopathy [G93.40] 04/13/2022 Yes    Lactic acidosis [E87.2] 04/21/2022 Yes    Diabetic ketoacidosis without coma associated with type 2 diabetes mellitus [E11.10] 04/20/2022 Yes    Hyperkalemia [E87.5] 04/13/2022 Yes    BRENDAN (acute kidney injury) [N17.9] 04/21/2022 Yes       Inpatient Medications Prescribed for Management of Current Problems:     Scheduled Meds:    amiodarone  200 mg Oral Daily    apixaban  5 mg Oral BID    atorvastatin  40 mg Oral Daily    clopidogreL  75 mg Oral  Daily    docusate sodium  50 mg Oral BID    insulin aspart U-100  12 Units Subcutaneous with dinner    [START ON 4/29/2022] insulin aspart U-100  16 Units Subcutaneous with breakfast    [START ON 4/29/2022] insulin aspart U-100  16 Units Subcutaneous with lunch    insulin detemir U-100  8 Units Subcutaneous BID    lacosamide  100 mg Oral Q12H    metoprolol succinate  50 mg Oral Daily    ondansetron  4 mg Oral TID WM    pantoprazole  40 mg Oral Daily    polyethylene glycol  17 g Oral Daily    senna-docusate 8.6-50 mg  1 tablet Oral BID    sucralfate  1 g Oral QID (AC & HS)    tamsulosin  0.4 mg Oral Daily     Continuous Infusions:   As Needed: acetaminophen, calcium carbonate, cloNIDine, dextrose 10%, dextrose 10%, diphenhydrAMINE, glucagon (human recombinant), glucose, glucose, hydrALAZINE, insulin aspart U-100, insulin aspart U-100, melatonin, ondansetron, prochlorperazine, senna, simethicone    VTE Risk Mitigation (From admission, onward)         Ordered     apixaban tablet 5 mg  2 times daily         04/20/22 1209              I have assessed these finding virtually using telemed platform and with assistance of bedside nurse     The attending portion of this evaluation, treatment, and documentation was performed per Tiffany Awad MD via Telemedicine AudioVisual using the secure [employer] software platform with 2 way audio/video. The provider was located off-site and the patient is located in the hospital. The aforementioned video software was utilized to document the relevant history and physical exam    Tiffany Awad MD  Department of Hospital Medicine   [facility] - Telemetry Stepdown

## 2022-04-29 NOTE — PROGRESS NOTES
[facility] - Telemetry Stepdown  Endocrinology  Progress Note    Admit Date: 4/11/2022     76 year old  male with T2DM, HTN, CAD, STEMI, HLD, paroxysmal Afib, CVA, seizures who presents for recurrent DKA.  He presented to [facility] ED on 4/11 with elevated blood glucose.  He was discharged from [facility] on 4/10 after being admitted for DKA on 4/5. Per family he was not feeling well after discharge to home and vomited several times on 4/11. Finger stick at home read High with unreadable blood glucose.  At this time daughter gave him 14 units of insulin and sublingual zofran. Other than malaise and nausea patient was not exhibiting any other signs/symptoms at home.    - In ER BG found to be 1015 with pCO2 6 and anion gap 35.  Hyperkalemic with K 7.0 and some EKG changes when compared to previous EKG.  Given calcium gluconate, and started on insulin gtt as well as subQ long acting insulin     - Endocrinology consulted for management of type 2 diabetes after resolution of DKA    - Spoke with daughter who states patient was discharged on 04/10/2022 with high glucose in the 400s which worsened after getting home and eating dinner; appropriate mealtime and correction insulin were given at that time. However, the next day patient's condition was worsening and they called EMS and his sugar was found to be in the thousands. She expresses concern that he cannot be care for adequately at home any longer and wishes to pursue skilled nursing facility.    Regarding Diabetes Mellitus:    Recurring episodes of DKA  Surgical Procedure and Date: NA  Diabetes diagnosis year: >20 years  Home Diabetes Medications:  During recent SNF was on Aspart 8 TID and glargine 8 units daily with sliding scale  How often checking glucose at home? >4 x day   Diabetes Complications include: Hyperglycemia, Hypoglycemia , and Diabetic peripheral neuropathy   Complicating diabetes co morbidities: History of CVA      Interval HPI:   Glucose trend ,  "tightly controlled now  A.m. prandial insulin skip today, npo after mn  Pt BG this AM was 49, D10 administered. BG at recheck 113. No other acute concerns overnight.   Overnight events:  none  Eatin%  Nausea: No  Hypoglycemia and intervention: Yes  Fever: No  TPN and/or TF: No  If yes, type of TF/TPN and rate: a    BP (!) 145/92 (BP Location: Right arm, Patient Position: Lying)   Pulse 64   Temp 98 °F (36.7 °C) (Oral)   Resp 18   Ht 5' 11" (1.803 m)   Wt 81.6 kg (179 lb 14.3 oz)   SpO2 96%   BMI 25.09 kg/m²     Labs Reviewed and Include    Recent Labs   Lab 22  0335   GLU 40*   CALCIUM 8.5*   ALBUMIN 2.6*   PROT 5.8*      K 3.7   CO2 25      BUN 17   CREATININE 0.8   ALKPHOS 97   ALT 54*   AST 88*   BILITOT 0.4     Lab Results   Component Value Date    WBC 5.46 2022    HGB 11.6 (L) 2022    HCT 35.5 (L) 2022    MCV 92 2022     2022     No results for input(s): TSH, FREET4 in the last 168 hours.  Lab Results   Component Value Date    HGBA1C 9.7 (H) 2022       Nutritional status:   Body mass index is 25.09 kg/m².  Lab Results   Component Value Date    ALBUMIN 2.6 (L) 2022    ALBUMIN 2.4 (L) 2022    ALBUMIN 2.4 (L) 2022     No results found for: PREALBUMIN    Estimated Creatinine Clearance: 81.1 mL/min (based on SCr of 0.8 mg/dL).    Accu-Checks  Recent Labs     22  2041 22  2259 22  0218 22  0725 22  1136 22  1544 22  2032 22  0342 22  0401 22  0741   POCTGLUCOSE 86 69* 147* 313* 261* 161* 112* 49* 113* 72       Current Medications and/or Treatments Impacting Glycemic Control  Immunotherapy:    Immunosuppressants       None          Steroids:   Hormones (From admission, onward)                Start     Stop Route Frequency Ordered    22 1047  melatonin tablet 6 mg  (Medication Panel)         -- Oral Nightly PRN 22 0948          Pressors:    Autonomic Drugs " (From admission, onward)                None          Hyperglycemia/Diabetes Medications:   Antihyperglycemics (From admission, onward)                Start     Stop Route Frequency Ordered    22 1645  insulin aspart U-100 pen 10 Units         -- SubQ with dinner 22 1013    22 1130  insulin aspart U-100 pen 16 Units         -- SubQ with lunch 22 1614    22 1015  insulin detemir U-100 pen 6 Units         -- SubQ 2 times daily 22 1013    22 0715  insulin aspart U-100 pen 16 Units         -- SubQ with breakfast 22 1614    22 0931  insulin aspart U-100 pen 1-10 Units         -- SubQ Before meals & nightly PRN 22 0832    22 0111  insulin aspart U-100 pen 4 Units         -- SubQ With snacks 22 0012            ASSESSMENT and PLAN    Ketosis-prone diabetes mellitus  Key History and Diagnostic Findings  - Admit for recurrent DKA  - A1c of 9.7 on 2022 from 11.5 on 2022    - Home Regimen: Levemir 8 units daily, aspart 8 units TIDWM   - Weight based dosin kg x 0.5 = 41 TDD x 0.5 = 20 basal / 20 prandial   - 1700/TDD = 41 (estimated insulin sensitivity factor)   - 450/TDD = 11 (estimated starting carb ratio for prandial dosing)    -  Glucose Goals: 140-180mg/dL  -  24 hour glucose trend: tight   -  Variable diet %, inconsistent carbs with meals, diet indiscretions, intermittent nausea   -  Boosts with meals    -  Hypoglycemia on Levemir 8 units bid while npo for procedure    Plan  -  Decrease Levemir 6 units BID  -  Aspart 16 units BF, 16 units L, 10 units D  -  Moderate correction scale        Nausea  Numerous episodes of nausea (and vomiting at home) during this admit and with prior admits  -  Attempted medical therapy with PPI, carafate, seizure hx no reglan, scheduled zofran  -  Primary suspects is multifactorial with GERD and poor gastric emptying suspected  -  GI proceeding with GES    Coronary artery disease  - Strive to  optimize glucose control      Stewart Rashid MD  Endocrinology  [facility] - Telemetry Stepdown

## 2022-04-29 NOTE — SUBJECTIVE & OBJECTIVE
"Interval HPI:   Glucose trend , tightly controlled now  A.m. prandial insulin skip today, npo after mn  Pt BG this AM was 49, D10 administered. BG at recheck 113. No other acute concerns overnight.   Overnight events:  none  Eatin%  Nausea: No  Hypoglycemia and intervention: Yes  Fever: No  TPN and/or TF: No  If yes, type of TF/TPN and rate: a    BP (!) 145/92 (BP Location: Right arm, Patient Position: Lying)   Pulse 64   Temp 98 °F (36.7 °C) (Oral)   Resp 18   Ht 5' 11" (1.803 m)   Wt 81.6 kg (179 lb 14.3 oz)   SpO2 96%   BMI 25.09 kg/m²     Labs Reviewed and Include    Recent Labs   Lab 22  033   GLU 40*   CALCIUM 8.5*   ALBUMIN 2.6*   PROT 5.8*      K 3.7   CO2 25      BUN 17   CREATININE 0.8   ALKPHOS 97   ALT 54*   AST 88*   BILITOT 0.4     Lab Results   Component Value Date    WBC 5.46 2022    HGB 11.6 (L) 2022    HCT 35.5 (L) 2022    MCV 92 2022     2022     No results for input(s): TSH, FREET4 in the last 168 hours.  Lab Results   Component Value Date    HGBA1C 9.7 (H) 2022       Nutritional status:   Body mass index is 25.09 kg/m².  Lab Results   Component Value Date    ALBUMIN 2.6 (L) 2022    ALBUMIN 2.4 (L) 2022    ALBUMIN 2.4 (L) 2022     No results found for: PREALBUMIN    Estimated Creatinine Clearance: 81.1 mL/min (based on SCr of 0.8 mg/dL).    Accu-Checks  Recent Labs     22  20422  2259 22  0218 22  0725 22  1136 22  1544 22  2032 22  0342 22  0401 22  0741   POCTGLUCOSE 86 69* 147* 313* 261* 161* 112* 49* 113* 72       Current Medications and/or Treatments Impacting Glycemic Control  Immunotherapy:    Immunosuppressants       None          Steroids:   Hormones (From admission, onward)                Start     Stop Route Frequency Ordered    22 1047  melatonin tablet 6 mg  (Medication Panel)         -- Oral Nightly PRN 22 0948 "          Pressors:    Autonomic Drugs (From admission, onward)                None          Hyperglycemia/Diabetes Medications:   Antihyperglycemics (From admission, onward)                Start     Stop Route Frequency Ordered    04/29/22 1645  insulin aspart U-100 pen 10 Units         -- SubQ with dinner 04/29/22 1013    04/29/22 1130  insulin aspart U-100 pen 16 Units         -- SubQ with lunch 04/28/22 1614    04/29/22 1015  insulin detemir U-100 pen 6 Units         -- SubQ 2 times daily 04/29/22 1013    04/29/22 0715  insulin aspart U-100 pen 16 Units         -- SubQ with breakfast 04/28/22 1614    04/25/22 0931  insulin aspart U-100 pen 1-10 Units         -- SubQ Before meals & nightly PRN 04/25/22 0832    04/17/22 0111  insulin aspart U-100 pen 4 Units         -- SubQ With snacks 04/17/22 0012

## 2022-04-29 NOTE — PT/OT/SLP PROGRESS
Physical Therapy      Patient Name:  Kamar Muñoz   MRN:  [id number]    Patient not seen today secondary to pt off floor 1st attempt. I was unable to return for a 2nd attempt. . Will follow-up per PT POC.

## 2022-04-29 NOTE — ASSESSMENT & PLAN NOTE
Numerous episodes of nausea (and vomiting at home) during this admit and with prior admits  -  Attempted medical therapy with PPI, carafate, seizure hx no reglan, scheduled zofran  -  Primary suspects is multifactorial with GERD and poor gastric emptying suspected  -  GI proceeding with GES

## 2022-04-29 NOTE — SUBJECTIVE & OBJECTIVE
Telemedicine  This service was provided by Virtual Visit.    Patient was transferred to [facility] Medicine on:  04/22/2022     Chief Complaint   Patient presents with    Altered Mental Status     The patient location is: [id number] A   Admitted 4/11/2022  8:55 PM  Present with the patient at the time of the telemed/virtual assessment: Telepresenter    Interval History / Events Overnight:   The patient is able to provide adequate history. Additional history was obtained from past medical records. No significant events reported by Nursing.  Patient wants to visit his wife (also in hospital)  Patient complains of nausea, better with scheduled Zofran. Symptoms have been unchanged since yesterday. Associated symptoms include: fatigue. Symptoms are stable.     Lab test(s) reviewed: hypoglycemia    Review of Systems   Constitutional:  Negative for fever.   Respiratory:  Negative for shortness of breath.      Objective:     Vital Signs (Most Recent):  Temp: 98 °F (36.7 °C) (04/29/22 0740)  Pulse: 64 (04/29/22 0740)  Resp: 18 (04/29/22 0740)  BP: (!) 145/92 (04/29/22 0740)  SpO2: 96 % (04/29/22 0740)   Vital Signs (24h Range):  Temp:  [97.4 °F (36.3 °C)-98.2 °F (36.8 °C)] 98 °F (36.7 °C)  Pulse:  [54-64] 64  Resp:  [16-18] 18  SpO2:  [94 %-98 %] 96 %  BP: (105-154)/(61-92) 145/92     Weight: 81.6 kg (179 lb 14.3 oz)  Body mass index is 25.09 kg/m².    Intake/Output Summary (Last 24 hours) at 4/29/2022 1129  Last data filed at 4/29/2022 1011  Gross per 24 hour   Intake 1000 ml   Output 1101 ml   Net -101 ml        Physical Exam  Constitutional:       General: He is not in acute distress.     Appearance: Normal appearance.   Eyes:      General: Lids are normal. No scleral icterus.        Right eye: No discharge.         Left eye: No discharge.      Conjunctiva/sclera: Conjunctivae normal.   Neck:      Trachea: Phonation normal.   Cardiovascular:      Rate and Rhythm: Normal rate.      Comments: Monitor / Vital signs  reviewed at time of visit  Pulmonary:      Effort: Pulmonary effort is normal. No tachypnea, accessory muscle usage or respiratory distress.   Abdominal:      General: There is no distension.   Skin:     Coloration: Skin is not cyanotic.   Neurological:      Mental Status: He is alert. He is not disoriented.   Psychiatric:         Attention and Perception: Attention normal.         Mood and Affect: Affect normal.         Behavior: Behavior is cooperative.       Significant Labs:   Recent Labs   Lab 12/29/21  0810 01/26/22  1512 04/05/22  1138   HGBA1C 12.3* 11.5* 9.7*         Recent Labs   Lab 04/29/22  0342 04/29/22  0401 04/29/22  0741   POCTGLUCOSE 49* 113* 72       Recent Labs   Lab 04/25/22 0407 04/27/22  0459 04/29/22  0335   WBC 7.29 6.86 5.46   HGB 11.4* 11.2* 11.6*   HCT 34.4* 35.1* 35.5*    243 227       Recent Labs   Lab 04/25/22 0407 04/27/22  0459 04/29/22  0335   GRAN 58.2  4.2 53.1  3.6 49.6  2.7   LYMPH 28.4  2.1 31.5  2.2 31.5  1.7   MONO 7.0  0.5 8.6  0.6 11.4  0.6   EOS 0.4 0.4 0.4       Recent Labs   Lab 04/25/22 0407 04/27/22  0459 04/29/22  0335    135* 138   K 4.5 4.0 3.7    100 103   CO2 26 27 25   BUN 21 20 17   CREATININE 0.9 0.8 0.8   * 135* 40*   CALCIUM 8.3* 8.4* 8.5*   ALBUMIN 2.4* 2.4* 2.6*   MG 1.5* 1.5* 1.9   PHOS 2.9 4.1 2.6*       Recent Labs   Lab 04/25/22 0407 04/27/22 0459 04/29/22  0335   ALKPHOS 103 97 97   ALT 36 38 54*   AST 52* 47* 88*   PROT 5.5* 5.6* 5.8*   BILITOT 0.5 0.5 0.4       Recent Labs   Lab 02/19/22  2242 02/20/22  0046 03/03/22  2113 03/05/22  1037 03/05/22  1939 03/07/22 2043 04/11/22  2236 04/12/22  0207 04/12/22  0618 04/12/22  1051 04/12/22  1324   PROCAL 0.93*  --   --   --   --   --   --  1.18*  --   --   --    LACTATE  --    < >  --    < > 1.0  --    < > 8.2* 4.2* 3.2* 2.0   DDIMER 0.84*   < > 0.50*  --  0.50* 0.43  --   --   --   --   --    FERRITIN 564*   < > 300  --  354* 334*  --   --   --   --   --    SEDRATE  92*  --   --   --   --   --   --   --   --   --   --     < > = values in this interval not displayed.       Results for orders placed or performed in visit on 05/05/14   Vitamin D   Result Value Ref Range    Vit D, 25-Hydroxy 24 (L) 30 - 96 ng/mL     SARS-CoV2 (COVID-19) Qualitative PCR (no units)   Date Value   03/21/2022 Not Detected   03/17/2022 Not Detected   03/14/2022 Not Detected   03/08/2022 Not Detected     POC Rapid COVID (no units)   Date Value   04/05/2022 Negative   02/17/2022 Positive (A)   01/25/2022 Negative   01/12/2022 Negative   01/09/2022 Negative       ECG Results              EKG 12-lead (Final result)  Result time 04/12/22 12:51:09      Final result by Interface, Lab In [facility] (04/12/22 12:51:09)                   Narrative:    Test Reason : R73.9,    Vent. Rate : 118 BPM     Atrial Rate : 111 BPM     P-R Int : 000 ms          QRS Dur : 162 ms      QT Int : 436 ms       P-R-T Axes : 000 -14 052 degrees     QTc Int : 611 ms    Wide QRS rhythm  Right bundle branch block  ST elevation anterior leads differential includes anterior STEMI vs,  repolarization abnormality  Abnormal ECG  When compared with ECG of 05-APR-2022 11:41,  Wide QRS rhythm has replaced Sinus rhythm  Vent. rate has increased BY  48 BPM  Confirmed by Megha Stock MD ([id number]) on 4/12/2022 12:51:00 PM    Referred By: [email or web address]   SELF           Confirmed By:Megha Stock MD                                    Results for orders placed during the hospital encounter of 01/25/22    Echo    Interpretation Summary  · There is abnormal septal wall motion.  · The left ventricle is normal in size with low normal systolic function.  · The estimated ejection fraction is 50%.  · Normal left ventricular diastolic function.  · Mild left atrial enlargement.  · Normal right ventricular size with normal right ventricular systolic function.  · Mild mitral regurgitation.  · There are segmental left ventricular wall motion abnormalities.  · Mild  tricuspid regurgitation.  · Elevated central venous pressure (15 mmHg).      CT Chest Without Contrast  Narrative: EXAMINATION:  CT CHEST WITHOUT CONTRAST    CLINICAL HISTORY:  pulmonary nodule, follow up;    TECHNIQUE:  Low dose axial images, sagittal and coronal reformations were obtained from the thoracic inlet to the lung bases. Contrast was not administered.    COMPARISON:  Chest x-ray 04/11/2022, 03/05/2022; CT chest 12/01/2021, 09/23/2021, 04/14/2021.    FINDINGS:  SOFT TISSUES:  Unremarkable.    HEART AND MEDIASTINUM:  Several slightly prominent mediastinal lymph nodes, including 1.6 cm level 4R right lower paratracheal node (axial series 2, image 51), grossly stable compared to prior.  Multi-vessel coronary arterial calcific plaques ranging from mild to moderate-severe.  Scattered calcific plaques in the visualized aorta.    PLEURA:  Unremarkable.    LUNGS AND AIRWAYS:  Airways are patent.  Advanced bilateral centrilobular and paraseptal emphysema with subpleural reticulations and bandlike opacities in the bilateral upper lobes with interval improvement in interlobular septal thickening.    There are new ground-glass opacities in the dependent portions of the right upper lobe and the right lower lobe, consider aspiration.    There is a new 7 mm right lower lobe posterior segment nodule (4-331)    There are several stable to mildly improved, bilateral solid pulmonary nodules with index lesions as follows:    *Evolving appearance of a right upper lobe posterior segment cavitary lesion with internal dependent mass; the cavity demonstrates a thinner wall and has decreased in diameter.  There is adjacent contracture of parenchyma.  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  The central debris is decreased in size,, now measuring 1.0 cm (axial series 4, image 81), previously 2.0 cm.  *Triangular 1.4 cm nodule in the posterior segment of the right upper lobe (axial series 4, image 181)  previously 1.8 cm  *Stable appearance of previously described 1.0 cm solid nodule in the posterior segment of the right upper lobe (4-277, prior exam 6-290).  *Stable 12 mm right upper lobe triangular opacity (4-203, prior 6-183).  *Stable 0.9 cm solid nodule in the superior segment of the right lower lobe (axial series 4, image 37), previously 0.9 cm.  ESOPHAGUS:  Unremarkable.    UPPER ABDOMEN (limited):  Left renal cortical hypodensities, likely simple cysts.  Otherwise unremarkable.    BONES: Degenerative changes of the thoracic spine.  No fractures or focal osseous lesions.  Impression: 1. There are several stable pulmonary nodules as described above.  2. There are new ground-glass opacities in the dependent portions of the right lung, consider aspiration.  3. There is a new 7 mm right lower lobe posterior segment nodule.  This may reflect infectious/inflammatory etiology given other findings in the chest.  Clinical considerations will determine if further investigation of this finding is to be pursued.    Electronically signed by resident: Ernesto Liu  Date:    04/19/2022  Time:    08:25    Electronically signed by: Katt Rubi  Date:    04/19/2022  Time:    10:02      Labs and Imaging within the last 24 hours listed above were reviewed.       Diet: Diet NPO  Significant LDAs:   IV Access Type: Peripheral  Urinary Catheter Indication if present: Patient Does Not Have Urinary Catheter  Other Lines/Tubes/Drains:         Goals of Care:    Previous admission:  4/5/22  Likely prognosis:  Fair  Code Status: DNR  Comfort Only: No  Hospice: No  Goals at discharge: remain at home, with physician follow-up    Discharge Planning   ALLIE: 4/30/2022     Code Status: DNR   Is the patient medically ready for discharge?: No    Reason for patient still in hospital (select all that apply): Patient trending condition, Imaging, and Consult recommendations  Discharge Plan A: Home, Home Health, Other (24/7 sitters and family  support)   Discharge Delays: None known at this time

## 2022-04-29 NOTE — CARE UPDATE
[facility] scheduled pt's hospital f/u visit on 5/5/22 @ 5:00pm. Endocrinology will make contact with pt to schedule his appointment.

## 2022-04-29 NOTE — ASSESSMENT & PLAN NOTE
Numerous episodes of nausea (and vomiting at home) during this admit and with prior admits  -no history of peptic ulcer disease per the patient but definite reflux symptoms  -therapy with pantoprazole alone ineffective; symptoms persist with addition of Carafate.  -suspect is multifactorial with GERD and poor gastric emptying suspected  -he is not a candidate for Reglan due to history of seizure  -currently requiring scheduled Zofran with meals to improve symptoms  -he is having regular bowel movements which are soft  -discussed with GI and proceeding with GES

## 2022-04-29 NOTE — ASSESSMENT & PLAN NOTE
- DKA now resolved  - Endocrine following and adjusting insulin regimen as needed  - SSI provided for corrective dosing  - Hypoglycemic protocol in effect  -Endocrine following due to erratic glucoses; notified of plan to discharge home in light of re-admits with DKA and eats regular diet at home

## 2022-04-29 NOTE — ASSESSMENT & PLAN NOTE
Nutrition consulted. Most recent weight and BMI monitored- Body mass index is 25.09 kg/m².      -he is drinking more Boost than eating food so increased Boost supplements to 2 servings with each tray.     Measurements:  Wt Readings from Last 1 Encounters:   04/27/22 81.6 kg (179 lb 14.3 oz)   Body mass index is 25.09 kg/m².    Recommendations: Recommendation/Intervention: 1. Continue current Diabetic diet + ONS. 2. RD to monitor & follow-up.  Goals: Meet % EEN, EPN by RD f/u date    Patient has been screened and assessed by RD. RD will follow patient.

## 2022-04-29 NOTE — PLAN OF CARE
Problem: Diabetes Comorbidity  Goal: Blood Glucose Level Within Targeted Range  Outcome: Ongoing, Progressing     Pt Aox4, VSS on RA, and denies pain. Pt BG this AM was 49, D10 administered. BG at recheck 113. No other acute concerns overnight.

## 2022-04-29 NOTE — ASSESSMENT & PLAN NOTE
Key History and Diagnostic Findings  - Admit for recurrent DKA  - A1c of 9.7 on 2022 from 11.5 on 2022    - Home Regimen: Levemir 8 units daily, aspart 8 units TIDWM   - Weight based dosin kg x 0.5 = 41 TDD x 0.5 = 20 basal / 20 prandial   - 1700/TDD = 41 (estimated insulin sensitivity factor)   - 450/TDD = 11 (estimated starting carb ratio for prandial dosing)    -  Glucose Goals: 140-180mg/dL  -  24 hour glucose trend: tight   -  Variable diet %, inconsistent carbs with meals, diet indiscretions, intermittent nausea   -  Boosts with meals    -  Hypoglycemia on Levemir 8 units bid while npo for procedure    Plan  -  Decrease Levemir 6 units BID  -  Aspart 16 units BF, 16 units L, 10 units D  -  Moderate correction scale

## 2022-04-29 NOTE — PLAN OF CARE
04/29/22 1544   Post-Acute Status   Post-Acute Authorization Placement;Home Health   Post-Acute Placement Status Patient declined/refused   Home Health Status Pending medical clearance/testing  ([facility]- accepted and aware patient may dc this weekend)   Hospital Resources/Appts/Education Provided Appointments scheduled and added to AVS   Received call from edd's son, Ed, inquiring about dc date and requesting patient have a dexcom. Discussed with Dr. Awad; patient could possibly dc this weekend if cleared by endocrinology; patient will need to f/u with outpatient endo MD for dexcom. Patient's hospital f/u appt scheduled per [facility]. SW attempted to schedule outpatient endo f/u appt, however, was informed the clinic will contact patient/family next week to schedule the appt. Updated Dr. Awad.    Attempted to notify patient's son of above, however, son is unavailable at this time. Updated patient at bedside. Updated [facility] of possible dc this weekend.    Bess Mathews LMSW  [facility]  Ext. [phone]

## 2022-04-29 NOTE — ASSESSMENT & PLAN NOTE
Remains requiring adult diapers  Post-void residual ordered to assess for retention  Initiate timed toileting for bladder training

## 2022-04-30 LAB
POCT GLUCOSE: 210 MG/DL (ref 70–110)
POCT GLUCOSE: 250 MG/DL (ref 70–110)
POCT GLUCOSE: 300 MG/DL (ref 70–110)
POCT GLUCOSE: 300 MG/DL (ref 70–110)
POCT GLUCOSE: 311 MG/DL (ref 70–110)
POCT GLUCOSE: 336 MG/DL (ref 70–110)

## 2022-04-30 PROCEDURE — 25000003 PHARM REV CODE 250: Performed by: INTERNAL MEDICINE

## 2022-04-30 PROCEDURE — 99232 PR SUBSEQUENT HOSPITAL CARE,LEVL II: ICD-10-PCS | Mod: GC,,, | Performed by: INTERNAL MEDICINE

## 2022-04-30 PROCEDURE — 63600175 PHARM REV CODE 636 W HCPCS: Performed by: STUDENT IN AN ORGANIZED HEALTH CARE EDUCATION/TRAINING PROGRAM

## 2022-04-30 PROCEDURE — 20600001 HC STEP DOWN PRIVATE ROOM

## 2022-04-30 PROCEDURE — 99232 SBSQ HOSP IP/OBS MODERATE 35: CPT | Mod: GC,,, | Performed by: INTERNAL MEDICINE

## 2022-04-30 PROCEDURE — 99232 SBSQ HOSP IP/OBS MODERATE 35: CPT | Mod: 95,,, | Performed by: INTERNAL MEDICINE

## 2022-04-30 PROCEDURE — 99232 PR SUBSEQUENT HOSPITAL CARE,LEVL II: ICD-10-PCS | Mod: 95,,, | Performed by: INTERNAL MEDICINE

## 2022-04-30 RX ORDER — INSULIN ASPART 100 [IU]/ML
12 INJECTION, SOLUTION INTRAVENOUS; SUBCUTANEOUS
Status: DISCONTINUED | OUTPATIENT
Start: 2022-04-30 | End: 2022-05-02

## 2022-04-30 RX ADMIN — APIXABAN 5 MG: 5 TABLET, FILM COATED ORAL at 09:04

## 2022-04-30 RX ADMIN — LACOSAMIDE 100 MG: 100 TABLET, FILM COATED ORAL at 09:04

## 2022-04-30 RX ADMIN — INSULIN ASPART 16 UNITS: 100 INJECTION, SOLUTION INTRAVENOUS; SUBCUTANEOUS at 12:04

## 2022-04-30 RX ADMIN — SUCRALFATE 1 G: 1 SUSPENSION ORAL at 12:04

## 2022-04-30 RX ADMIN — PANTOPRAZOLE SODIUM 40 MG: 40 TABLET, DELAYED RELEASE ORAL at 09:04

## 2022-04-30 RX ADMIN — APIXABAN 5 MG: 5 TABLET, FILM COATED ORAL at 08:04

## 2022-04-30 RX ADMIN — SENNOSIDES AND DOCUSATE SODIUM 1 TABLET: 50; 8.6 TABLET ORAL at 09:04

## 2022-04-30 RX ADMIN — INSULIN ASPART 8 UNITS: 100 INJECTION, SOLUTION INTRAVENOUS; SUBCUTANEOUS at 09:04

## 2022-04-30 RX ADMIN — POLYETHYLENE GLYCOL 3350 17 G: 17 POWDER, FOR SOLUTION ORAL at 09:04

## 2022-04-30 RX ADMIN — TAMSULOSIN HYDROCHLORIDE 0.4 MG: 0.4 CAPSULE ORAL at 09:04

## 2022-04-30 RX ADMIN — SUCRALFATE 1 G: 1 SUSPENSION ORAL at 06:04

## 2022-04-30 RX ADMIN — INSULIN ASPART 4 UNITS: 100 INJECTION, SOLUTION INTRAVENOUS; SUBCUTANEOUS at 04:04

## 2022-04-30 RX ADMIN — INSULIN ASPART 5 UNITS: 100 INJECTION, SOLUTION INTRAVENOUS; SUBCUTANEOUS at 08:04

## 2022-04-30 RX ADMIN — INSULIN ASPART 6 UNITS: 100 INJECTION, SOLUTION INTRAVENOUS; SUBCUTANEOUS at 12:04

## 2022-04-30 RX ADMIN — METOPROLOL SUCCINATE 50 MG: 50 TABLET, EXTENDED RELEASE ORAL at 09:04

## 2022-04-30 RX ADMIN — DOCUSATE SODIUM 50 MG: 50 CAPSULE, LIQUID FILLED ORAL at 09:04

## 2022-04-30 RX ADMIN — ATORVASTATIN CALCIUM 40 MG: 20 TABLET, FILM COATED ORAL at 09:04

## 2022-04-30 RX ADMIN — SUCRALFATE 1 G: 1 SUSPENSION ORAL at 08:04

## 2022-04-30 RX ADMIN — INSULIN ASPART 16 UNITS: 100 INJECTION, SOLUTION INTRAVENOUS; SUBCUTANEOUS at 09:04

## 2022-04-30 RX ADMIN — Medication 6 MG: at 08:04

## 2022-04-30 RX ADMIN — CLOPIDOGREL 75 MG: 75 TABLET, FILM COATED ORAL at 09:04

## 2022-04-30 RX ADMIN — AMIODARONE HYDROCHLORIDE 200 MG: 200 TABLET ORAL at 09:04

## 2022-04-30 RX ADMIN — LACOSAMIDE 100 MG: 100 TABLET, FILM COATED ORAL at 08:04

## 2022-04-30 RX ADMIN — SUCRALFATE 1 G: 1 SUSPENSION ORAL at 04:04

## 2022-04-30 RX ADMIN — INSULIN ASPART 12 UNITS: 100 INJECTION, SOLUTION INTRAVENOUS; SUBCUTANEOUS at 04:04

## 2022-04-30 NOTE — ASSESSMENT & PLAN NOTE
Numerous episodes of nausea (and vomiting at home) during this admit and with prior admits  -no history of peptic ulcer disease per the patient but definite reflux symptoms  -therapy with pantoprazole alone ineffective; symptoms persist with addition of Carafate.  -suspect is multifactorial with GERD and poor gastric emptying suspected  -he is not a candidate for Reglan due to history of seizure  -currently requiring scheduled Zofran with meals to improve symptoms  -he is having regular bowel movements which are soft  -discussed with GI and proceeded with GES which was normal

## 2022-04-30 NOTE — SUBJECTIVE & OBJECTIVE
Telemedicine  This service was provided by Virtual Visit.    Patient was transferred to [facility] on:  04/22/2022     Chief Complaint   Patient presents with    Altered Mental Status     The patient location is: 8092/8092 A   Admitted 4/11/2022  8:55 PM  Present with the patient at the time of the telemed/virtual assessment: Telepresenter    Interval History / Events Overnight:   The patient is able to provide adequate history. Additional history was obtained from past medical records. No significant events reported by Nursing.    Patient complains of nausea, better with scheduled Zofran. Symptoms have improved since yesterday. Associated symptoms include: fatigue. Symptoms are decreasing in severity.     Lab test(s) reviewed: high glycemic variability    Review of Systems   Constitutional:  Negative for fever.   Respiratory:  Negative for shortness of breath.    Musculoskeletal:  Positive for gait problem.   Neurological:  Positive for weakness.     Objective:     Vital Signs (Most Recent):  Temp: 98.5 °F (36.9 °C) (04/30/22 0730)  Pulse: 64 (04/30/22 0730)  Resp: 15 (04/30/22 0730)  BP: 133/60 (04/30/22 0730)  SpO2: 95 % (04/30/22 0730)   Vital Signs (24h Range):  Temp:  [98 °F (36.7 °C)-98.5 °F (36.9 °C)] 98.5 °F (36.9 °C)  Pulse:  [62-90] 64  Resp:  [15-18] 15  SpO2:  [95 %-97 %] 95 %  BP: (115-133)/(60-68) 133/60     Weight: 81.6 kg (179 lb 14.3 oz)  Body mass index is 25.09 kg/m².    Intake/Output Summary (Last 24 hours) at 4/30/2022 1031  Last data filed at 4/29/2022 1837  Gross per 24 hour   Intake 200 ml   Output --   Net 200 ml        Physical Exam  Constitutional:       General: He is not in acute distress.     Appearance: Normal appearance.   Eyes:      General: Lids are normal. No scleral icterus.        Right eye: No discharge.         Left eye: No discharge.      Conjunctiva/sclera: Conjunctivae normal.   Neck:      Trachea: Phonation normal.   Cardiovascular:      Rate and Rhythm:  Normal rate.      Comments: Monitor / Vital signs reviewed at time of visit  Pulmonary:      Effort: Pulmonary effort is normal. No tachypnea, accessory muscle usage or respiratory distress.   Abdominal:      General: There is no distension.   Skin:     Coloration: Skin is not cyanotic.   Neurological:      Mental Status: He is alert. He is not disoriented.   Psychiatric:         Attention and Perception: Attention normal.         Mood and Affect: Affect normal. Mood is depressed.         Behavior: Behavior is cooperative.       Significant Labs:   Recent Labs   Lab 12/29/21  0810 01/26/22  1512 04/05/22  1138   HGBA1C 12.3* 11.5* 9.7*       Recent Labs   Lab 04/29/22  2344 04/30/22  0501 04/30/22  0739   POCTGLUCOSE 274* 300* 311*       Recent Labs   Lab 04/25/22 0407 04/27/22  0459 04/29/22  0335   WBC 7.29 6.86 5.46   HGB 11.4* 11.2* 11.6*   HCT 34.4* 35.1* 35.5*    243 227       Recent Labs   Lab 04/25/22 0407 04/27/22 0459 04/29/22  0335   GRAN 58.2  4.2 53.1  3.6 49.6  2.7   LYMPH 28.4  2.1 31.5  2.2 31.5  1.7   MONO 7.0  0.5 8.6  0.6 11.4  0.6   EOS 0.4 0.4 0.4       Recent Labs   Lab 04/25/22 0407 04/27/22 0459 04/29/22  0335    135* 138   K 4.5 4.0 3.7    100 103   CO2 26 27 25   BUN 21 20 17   CREATININE 0.9 0.8 0.8   * 135* 40*   CALCIUM 8.3* 8.4* 8.5*   ALBUMIN 2.4* 2.4* 2.6*   MG 1.5* 1.5* 1.9   PHOS 2.9 4.1 2.6*       Recent Labs   Lab 04/25/22 0407 04/27/22 0459 04/29/22  0335   ALKPHOS 103 97 97   ALT 36 38 54*   AST 52* 47* 88*   PROT 5.5* 5.6* 5.8*   BILITOT 0.5 0.5 0.4       Recent Labs   Lab 02/19/22  2242 02/20/22  0046 03/03/22  2113 03/05/22  1037 03/05/22  1939 03/07/22  2043 04/11/22  2236 04/12/22  0207 04/12/22  0618 04/12/22  1051 04/12/22  1324   PROCAL 0.93*  --   --   --   --   --   --  1.18*  --   --   --    LACTATE  --    < >  --    < > 1.0  --    < > 8.2* 4.2* 3.2* 2.0   DDIMER 0.84*   < > 0.50*  --  0.50* 0.43  --   --   --   --   --     FERRITIN 564*   < > 300  --  354* 334*  --   --   --   --   --    SEDRATE 92*  --   --   --   --   --   --   --   --   --   --     < > = values in this interval not displayed.       Results for orders placed or performed in visit on 05/05/14   Vitamin D   Result Value Ref Range    Vit D, 25-Hydroxy 24 (L) 30 - 96 ng/mL     SARS-CoV2 (COVID-19) Qualitative PCR (no units)   Date Value   03/21/2022 Not Detected   03/17/2022 Not Detected   03/14/2022 Not Detected   03/08/2022 Not Detected     POC Rapid COVID (no units)   Date Value   04/05/2022 Negative   02/17/2022 Positive (A)   01/25/2022 Negative   01/12/2022 Negative   01/09/2022 Negative       ECG Results              EKG 12-lead (Final result)  Result time 04/12/22 12:51:09      Final result by Interface, Lab In [facility] (04/12/22 12:51:09)                   Narrative:    Test Reason : R73.9,    Vent. Rate : 118 BPM     Atrial Rate : 111 BPM     P-R Int : 000 ms          QRS Dur : 162 ms      QT Int : 436 ms       P-R-T Axes : 000 -14 052 degrees     QTc Int : 611 ms    Wide QRS rhythm  Right bundle branch block  ST elevation anterior leads differential includes anterior STEMI vs,  repolarization abnormality  Abnormal ECG  When compared with ECG of 05-APR-2022 11:41,  Wide QRS rhythm has replaced Sinus rhythm  Vent. rate has increased BY  48 BPM  Confirmed by Dayami SHAW, Megha ([id number]) on 4/12/2022 12:51:00 PM    Referred By: [email or web address]   SELF           Confirmed By:Megha Stock MD                                    Results for orders placed during the hospital encounter of 01/25/22    Echo    Interpretation Summary  · There is abnormal septal wall motion.  · The left ventricle is normal in size with low normal systolic function.  · The estimated ejection fraction is 50%.  · Normal left ventricular diastolic function.  · Mild left atrial enlargement.  · Normal right ventricular size with normal right ventricular systolic function.  · Mild mitral  regurgitation.  · There are segmental left ventricular wall motion abnormalities.  · Mild tricuspid regurgitation.  · Elevated central venous pressure (15 mmHg).      NM Gastric Emptying  Narrative: EXAMINATION:  NM GASTRIC EMPTYING    CLINICAL HISTORY:  chronic nausea in diabetic;    TECHNIQUE:  The patient received 1.0 mCi orally of sulfur colloid labeled meal in less than 10 minutes.    Anterior and posterior projection images were obtained immediately and at 60 minute intervals for 4 hours.    Geometric mean of the anterior and the posterior images was generated. The decay-corrected time activity curve and the percentage of the retention and emptying were obtained.    COMPARISON:  CT abdomen pelvis 04/13/2022.    FINDINGS:  At 4 hour(s) the percentage of retention is 2 % (normal retention at 4 hours is 10% and lower).  Impression: Normal gastric emptying time.    I, Bam Booth MD, attest that I reviewed and interpreted the images.    Electronically signed by resident: Cisco Alvarado MD  Date:    04/29/2022  Time:    14:09    Electronically signed by: Bam Booth  Date:    04/29/2022  Time:    14:18      Labs and Imaging within the last 24 hours listed above were reviewed.       Diet: Diet diabetic [facility]; 2000 Calorie  Significant LDAs:   IV Access Type: Peripheral  Urinary Catheter Indication if present: Patient Does Not Have Urinary Catheter  Other Lines/Tubes/Drains:         Goals of Care:    Previous admission:  4/5/22  Likely prognosis:  Fair  Code Status: DNR  Comfort Only: No  Hospice: No  Goals at discharge: remain at home, with physician follow-up    Discharge Planning   ALLIE: 4/30/2022     Code Status: DNR   Is the patient medically ready for discharge?: No    Reason for patient still in hospital (select all that apply): Patient trending condition, Imaging, and Consult recommendations  Discharge Plan A: Home, Home Health, Other (24/7 sitters and family support)   Discharge Delays: None known at  this time

## 2022-04-30 NOTE — PROGRESS NOTES
[facility] - Telemetry [facility] Medicine  Telemedicine Progress Note    Patient Name: Kamar Muñoz  MRN: [id number]  Patient Class: IP- Inpatient   Admission Date: 4/11/2022  Length of Stay: 18 days  Attending Physician: Tiffany Awad MD  Primary Care Provider: Basim Guerrero MD      Subjective:     Principal Problem:Pulmonary cavitary lesion    HPI:  Mr. Kamar Muñoz is a 76 y.o. male with a past medical history of HTN, Type 2 DM, CAD, STEMI, HLD, paroxysmal Afib, CVA, seizures and recurrent DKA.  He presented to [facility] [place] on 4[place]11 with elevated blood glucose.  He was discharged from [facility] [place] 4/10 after being admitted for DKA on 4/5.  At time of exam patient is alert but altered and unable to provide any history.  Spoke with patient's daughter who states she is a primary caregiver at home and obtained history as well as review of chart.  Per family he was not feeling well after discharge to home and vomited several times on 4/11. Unknown characteristics of emesis. Finger stick at home read High with unreadable blood glucose.  At this time daughter gave him 14 units of insulin and sublingual zofran. Other than malaise and nausea patient was not exhibiting any other signs/symptoms at home.  In ER BG found to be 1015 with pCO2 6 and anion gap 35.  Hyperkalemic with K 7.0 and some EKG changes when compared to previous EKG.  Given calcium gluconate, and started on insulin gtt as well as subQ long acting insulin.      Critical Care Medicine consulted for DKA and admitted to MICU.        Overview/Hospital Course:  Admitted to MICU on 4/11 for DKA with BG >1000, pCO2 6, AG 35, and hyperkalemia.  Given Calcium gluconate and started on insulin gtt in ED.  Hyperkalemia not improved on repeat labs and bolused with IV insulin.  Infectious workup sent and broad spectrum abx started.  4/12 potassium improving with insulin. 4/13 Gap closed, insulin gtt turned off and switched to subq insulin. Endocrine  consulted for insulin mgmt. He was stepped down to  4/14.    Since step down stable. Advanced diet. Endocrine following and titrating insulin. Poor po intake making it difficult to adjust insulin. CT chest with cavitary lesion, pulmonary and ID consulted. SNF once medically stable for dc.      Telemedicine  This service was provided by Virtual Visit.    Patient was transferred to [facility] Medicine on:  04/22/2022     Chief Complaint   Patient presents with    Altered Mental Status     The patient location is: [id number] A   Admitted 4/11/2022  8:55 PM  Present with the patient at the time of the telemed/virtual assessment: Telepresenter    Interval History / Events Overnight:   The patient is able to provide adequate history. Additional history was obtained from past medical records. No significant events reported by Nursing.  Patient wants to visit his wife (also in hospital)  Patient complains of nausea, better with scheduled Zofran. Symptoms have been unchanged since yesterday. Associated symptoms include: fatigue. Symptoms are stable.     Lab test(s) reviewed: hypoglycemia    Review of Systems   Constitutional:  Negative for fever.   Respiratory:  Negative for shortness of breath.      Objective:     Vital Signs (Most Recent):  Temp: 98 °F (36.7 °C) (04/29/22 0740)  Pulse: 64 (04/29/22 0740)  Resp: 18 (04/29/22 0740)  BP: (!) 145/92 (04/29/22 0740)  SpO2: 96 % (04/29/22 0740)   Vital Signs (24h Range):  Temp:  [97.4 °F (36.3 °C)-98.2 °F (36.8 °C)] 98 °F (36.7 °C)  Pulse:  [54-64] 64  Resp:  [16-18] 18  SpO2:  [94 %-98 %] 96 %  BP: (105-154)/(61-92) 145/92     Weight: 81.6 kg (179 lb 14.3 oz)  Body mass index is 25.09 kg/m².    Intake/Output Summary (Last 24 hours) at 4/29/2022 1129  Last data filed at 4/29/2022 1011  Gross per 24 hour   Intake 1000 ml   Output 1101 ml   Net -101 ml        Physical Exam  Constitutional:       General: He is not in acute distress.     Appearance: Normal appearance.    Eyes:      General: Lids are normal. No scleral icterus.        Right eye: No discharge.         Left eye: No discharge.      Conjunctiva/sclera: Conjunctivae normal.   Neck:      Trachea: Phonation normal.   Cardiovascular:      Rate and Rhythm: Normal rate.      Comments: Monitor / Vital signs reviewed at time of visit  Pulmonary:      Effort: Pulmonary effort is normal. No tachypnea, accessory muscle usage or respiratory distress.   Abdominal:      General: There is no distension.   Skin:     Coloration: Skin is not cyanotic.   Neurological:      Mental Status: He is alert. He is not disoriented.   Psychiatric:         Attention and Perception: Attention normal.         Mood and Affect: Affect normal.         Behavior: Behavior is cooperative.       Significant Labs:   Recent Labs   Lab 12/29/21  0810 01/26/22  1512 04/05/22  1138   HGBA1C 12.3* 11.5* 9.7*         Recent Labs   Lab 04/29/22  0342 04/29/22  0401 04/29/22  0741   POCTGLUCOSE 49* 113* 72       Recent Labs   Lab 04/25/22 0407 04/27/22  0459 04/29/22  0335   WBC 7.29 6.86 5.46   HGB 11.4* 11.2* 11.6*   HCT 34.4* 35.1* 35.5*    243 227       Recent Labs   Lab 04/25/22  0407 04/27/22  0459 04/29/22  0335   GRAN 58.2  4.2 53.1  3.6 49.6  2.7   LYMPH 28.4  2.1 31.5  2.2 31.5  1.7   MONO 7.0  0.5 8.6  0.6 11.4  0.6   EOS 0.4 0.4 0.4       Recent Labs   Lab 04/25/22  0407 04/27/22  0459 04/29/22  0335    135* 138   K 4.5 4.0 3.7    100 103   CO2 26 27 25   BUN 21 20 17   CREATININE 0.9 0.8 0.8   * 135* 40*   CALCIUM 8.3* 8.4* 8.5*   ALBUMIN 2.4* 2.4* 2.6*   MG 1.5* 1.5* 1.9   PHOS 2.9 4.1 2.6*       Recent Labs   Lab 04/25/22  0407 04/27/22  0459 04/29/22  0335   ALKPHOS 103 97 97   ALT 36 38 54*   AST 52* 47* 88*   PROT 5.5* 5.6* 5.8*   BILITOT 0.5 0.5 0.4       Recent Labs   Lab 02/19/22  2242 02/20/22  0046 03/03/22  2113 03/05/22  1037 03/05/22  1939 03/07/22  2043 04/11/22  2236 04/12/22  0207 04/12/22  0618  04/12/22  1051 04/12/22  1324   PROCAL 0.93*  --   --   --   --   --   --  1.18*  --   --   --    LACTATE  --    < >  --    < > 1.0  --    < > 8.2* 4.2* 3.2* 2.0   DDIMER 0.84*   < > 0.50*  --  0.50* 0.43  --   --   --   --   --    FERRITIN 564*   < > 300  --  354* 334*  --   --   --   --   --    SEDRATE 92*  --   --   --   --   --   --   --   --   --   --     < > = values in this interval not displayed.       Results for orders placed or performed in visit on 05/05/14   Vitamin D   Result Value Ref Range    Vit D, 25-Hydroxy 24 (L) 30 - 96 ng/mL     SARS-CoV2 (COVID-19) Qualitative PCR (no units)   Date Value   03/21/2022 Not Detected   03/17/2022 Not Detected   03/14/2022 Not Detected   03/08/2022 Not Detected     POC Rapid COVID (no units)   Date Value   04/05/2022 Negative   02/17/2022 Positive (A)   01/25/2022 Negative   01/12/2022 Negative   01/09/2022 Negative       ECG Results              EKG 12-lead (Final result)  Result time 04/12/22 12:51:09      Final result by Interface, Lab In [facility] (04/12/22 12:51:09)                   Narrative:    Test Reason : R73.9,    Vent. Rate : 118 BPM     Atrial Rate : 111 BPM     P-R Int : 000 ms          QRS Dur : 162 ms      QT Int : 436 ms       P-R-T Axes : 000 -14 052 degrees     QTc Int : 611 ms    Wide QRS rhythm  Right bundle branch block  ST elevation anterior leads differential includes anterior STEMI vs,  repolarization abnormality  Abnormal ECG  When compared with ECG of 05-APR-2022 11:41,  Wide QRS rhythm has replaced Sinus rhythm  Vent. rate has increased BY  48 BPM  Confirmed by Dayami SHAW, Megha ([id number]) on 4/12/2022 12:51:00 PM    Referred By: [email or web address]   SELF           Confirmed By:Megha Stock MD                                    Results for orders placed during the hospital encounter of 01/25/22    Echo    Interpretation Summary  · There is abnormal septal wall motion.  · The left ventricle is normal in size with low normal systolic function.  ·  The estimated ejection fraction is 50%.  · Normal left ventricular diastolic function.  · Mild left atrial enlargement.  · Normal right ventricular size with normal right ventricular systolic function.  · Mild mitral regurgitation.  · There are segmental left ventricular wall motion abnormalities.  · Mild tricuspid regurgitation.  · Elevated central venous pressure (15 mmHg).      CT Chest Without Contrast  Narrative: EXAMINATION:  CT CHEST WITHOUT CONTRAST    CLINICAL HISTORY:  pulmonary nodule, follow up;    TECHNIQUE:  Low dose axial images, sagittal and coronal reformations were obtained from the thoracic inlet to the lung bases. Contrast was not administered.    COMPARISON:  Chest x-ray 04/11/2022, 03/05/2022; CT chest 12/01/2021, 09/23/2021, 04/14/2021.    FINDINGS:  SOFT TISSUES:  Unremarkable.    HEART AND MEDIASTINUM:  Several slightly prominent mediastinal lymph nodes, including 1.6 cm level 4R right lower paratracheal node (axial series 2, image 51), grossly stable compared to prior.  Multi-vessel coronary arterial calcific plaques ranging from mild to moderate-severe.  Scattered calcific plaques in the visualized aorta.    PLEURA:  Unremarkable.    LUNGS AND AIRWAYS:  Airways are patent.  Advanced bilateral centrilobular and paraseptal emphysema with subpleural reticulations and bandlike opacities in the bilateral upper lobes with interval improvement in interlobular septal thickening.    There are new ground-glass opacities in the dependent portions of the right upper lobe and the right lower lobe, consider aspiration.    There is a new 7 mm right lower lobe posterior segment nodule (4-331)    There are several stable to mildly improved, bilateral solid pulmonary nodules with index lesions as follows:    *Evolving appearance of a right upper lobe posterior segment cavitary lesion with internal dependent mass; the cavity demonstrates a thinner wall and has decreased in diameter.  There is adjacent  contracture of parenchyma.  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  The central debris is decreased in size,, now measuring 1.0 cm (axial series 4, image 81), previously 2.0 cm.  *Triangular 1.4 cm nodule in the posterior segment of the right upper lobe (axial series 4, image 181) previously 1.8 cm  *Stable appearance of previously described 1.0 cm solid nodule in the posterior segment of the right upper lobe (4-277, prior exam 6-290).  *Stable 12 mm right upper lobe triangular opacity (4-203, prior 6-183).  *Stable 0.9 cm solid nodule in the superior segment of the right lower lobe (axial series 4, image 37), previously 0.9 cm.  ESOPHAGUS:  Unremarkable.    UPPER ABDOMEN (limited):  Left renal cortical hypodensities, likely simple cysts.  Otherwise unremarkable.    BONES: Degenerative changes of the thoracic spine.  No fractures or focal osseous lesions.  Impression: 1. There are several stable pulmonary nodules as described above.  2. There are new ground-glass opacities in the dependent portions of the right lung, consider aspiration.  3. There is a new 7 mm right lower lobe posterior segment nodule.  This may reflect infectious/inflammatory etiology given other findings in the chest.  Clinical considerations will determine if further investigation of this finding is to be pursued.    Electronically signed by resident: Ernesto Lui  Date:    04/19/2022  Time:    08:25    Electronically signed by: Katt Rubi  Date:    04/19/2022  Time:    10:02      Labs and Imaging within the last 24 hours listed above were reviewed.       Diet: Diet NPO  Significant LDAs:   IV Access Type: Peripheral  Urinary Catheter Indication if present: Patient Does Not Have Urinary Catheter  Other Lines/Tubes/Drains:         Goals of Care:    Previous admission:  4/5/22  Likely prognosis:  Fair  Code Status: DNR  Comfort Only: No  Hospice: No  Goals at discharge: remain at home, with physician  "follow-up    Discharge Planning   ALLIE: 4/30/2022     Code Status: DNR   Is the patient medically ready for discharge?: No    Reason for patient still in hospital (select all that apply): Patient trending condition, Imaging, and Consult recommendations  Discharge Plan A: Home, Home Health, Other (24/7 sitters and family support)   Discharge Delays: None known at this time    Assessment/Plan:      * Pulmonary cavitary lesion  - As per CT, noted about a month ago  - Repeat CT Chest without contrast obtained on 4/19 revealed "an evolving appearance of a right upper lobe posterior segment cavitary lesion with internal dependent mass; the cavity demonstrates a thinner wall and has decreased in diameter.  There is adjacent contracture of parenchyma.  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  The central debris is decreased in size,, now measuring 1.0 cm (axial series 4, image 81), previously 2.0 cm, and various other nodules".   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  - 1/11/2022 Culture with MAC pending susceptibilities   - 1/11/2022 2nd AFB culture with pending results  - Differential includes MAC, aspergillosis, chronic aspiration and less likely malignancy   - Pulmonary rec Augmentin x 7 days   - Induced sputum and send for cultures ordered, and AFB - has not been able to supply sample  - ID consulted and recommend repeat AFB sputum  - Poor candidate for surgical intervention given he is on triple therapy (eliquis + DAPT) for both afib and recent STEMI with LAUREN stent placed in January  - suspect his weight loss and recent decline worsened by underlying infection  - Repeat Chest CT in 3 months - 7/2022   - No indication for bronchoscopy at this time  - Can continue follow up with Dr. Louie outpatient.   - On RA and denies respiratory symptoms    Nausea  Numerous episodes of nausea (and vomiting at home) during this admit and with prior admits  -no " history of peptic ulcer disease per the patient but definite reflux symptoms  -therapy with pantoprazole alone ineffective; symptoms persist with addition of Carafate.  -suspect is multifactorial with GERD and poor gastric emptying suspected  -he is not a candidate for Reglan due to history of seizure  -currently requiring scheduled Zofran with meals to improve symptoms  -he is having regular bowel movements which are soft  -discussed with GI and proceeded with GES which was normal    Bacterial pneumonia  Completed 7 days of Augmentin    Goals of care, counseling/discussion  - DNR as per ICU    Functional urinary incontinence  Remains requiring adult diapers  Post-void residual ordered to assess for retention  Initiate timed toileting for bladder training    Severe malnutrition  Nutrition consulted. Most recent weight and BMI monitored- Body mass index is 25.09 kg/m².      -he is drinking more Boost than eating food so increased Boost supplements to 2 servings with each tray.     Measurements:  Wt Readings from Last 1 Encounters:   04/27/22 81.6 kg (179 lb 14.3 oz)   Body mass index is 25.09 kg/m².    Recommendations: Recommendation/Intervention: 1. Continue current Diabetic diet + ONS. 2. RD to monitor & follow-up.  Goals: Meet % EEN, EPN by RD f/u date    Patient has been screened and assessed by RD. RD will follow patient.    Discharge planning issues  PT/OT recommends skilled nursing facility for ongoing therapy; patient acknowledges and agrees with the need for ongoing therapy but unfortunately he is required to pay a co-pay for further SNF days.    He feels his only options to go home with limited sitter assistance and home health; his son has made arrangements for him to go to his preferred facility, [facility] but the patient feels he cannot manage the financial implications of going there; patient's son is committed to getting patient to [facility] which is likely his safest disposition as  patient needs 24/7 care.    Ketosis-prone diabetes mellitus  - DKA now resolved  - Endocrine following and adjusting insulin regimen as needed  - SSI provided for corrective dosing  - Hypoglycemic protocol in effect  -Endocrine following due to erratic glucoses; notified of plan to discharge home in light of re-admits with DKA and eats regular diet at home    Focal seizures  - History of seizures treated with lacosamide.    - Continue home lacosamide    Coronary artery disease  - Stable  - Continue home regimen of aspirin, atorvastatin, clopidogrel, losartan, and metoprolol    Paroxysmal atrial fibrillation  - History of A-fib.    - Continue home regimen of amiodarone, apixaban, and metoprolol    Essential hypertension  - resume home meds as tolerated        Active Hospital Problems    Diagnosis  POA    *Pulmonary cavitary lesion [J98.4]  Yes    Nausea [R11.0]  Yes    Dyslipidemia associated with type 2 diabetes mellitus [E11.69, E78.5]  Yes    Bacterial pneumonia [J15.9]  Yes    Goals of care, counseling/discussion [Z71.89]  Not Applicable    Functional urinary incontinence [R39.81]  Yes    Severe malnutrition [E43]  Yes    Discharge planning issues [Z02.9]  Not Applicable    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]  Yes    Focal seizures [R56.9]  Yes     Chronic    Coronary artery disease [I25.10]  Yes    Paroxysmal atrial fibrillation [I48.0]  Yes     Chronic    Essential hypertension [I10]  Yes     Chronic      Resolved Hospital Problems    Diagnosis Date Resolved POA    Encephalopathy [G93.40] 04/13/2022 Yes    Lactic acidosis [E87.2] 04/21/2022 Yes    Diabetic ketoacidosis without coma associated with type 2 diabetes mellitus [E11.10] 04/20/2022 Yes    Hyperkalemia [E87.5] 04/13/2022 Yes    BRENDAN (acute kidney injury) [N17.9] 04/21/2022 Yes       Inpatient Medications Prescribed for Management of Current Problems:     Scheduled Meds:    amiodarone  200 mg Oral Daily    apixaban  5 mg Oral BID     atorvastatin  40 mg Oral Daily    clopidogreL  75 mg Oral Daily    docusate sodium  50 mg Oral BID    insulin aspart U-100  10 Units Subcutaneous with dinner    insulin aspart U-100  16 Units Subcutaneous with breakfast    insulin aspart U-100  16 Units Subcutaneous with lunch    insulin detemir U-100  6 Units Subcutaneous BID    lacosamide  100 mg Oral Q12H    metoprolol succinate  50 mg Oral Daily    ondansetron  4 mg Oral TID WM    pantoprazole  40 mg Oral Daily    polyethylene glycol  17 g Oral Daily    senna-docusate 8.6-50 mg  1 tablet Oral BID    sucralfate  1 g Oral QID (AC & HS)    tamsulosin  0.4 mg Oral Daily     Continuous Infusions:   As Needed: acetaminophen, calcium carbonate, cloNIDine, dextrose 10%, dextrose 10%, diphenhydrAMINE, glucagon (human recombinant), glucose, glucose, hydrALAZINE, insulin aspart U-100, insulin aspart U-100, melatonin, ondansetron, prochlorperazine, senna, simethicone    VTE Risk Mitigation (From admission, onward)         Ordered     apixaban tablet 5 mg  2 times daily         04/20/22 1209              I have assessed these finding virtually using telemed platform and with assistance of bedside nurse     The attending portion of this evaluation, treatment, and documentation was performed per Tiffany Awad MD via Telemedicine AudioVisual using the secure AOL software platform with 2 way audio/video. The provider was located off-site and the patient is located in the hospital. The aforementioned video software was utilized to document the relevant history and physical exam.    Tiffany Awad MD  Department of Hospital Medicine   [facility] - Telemetry Stepdown

## 2022-04-30 NOTE — PROGRESS NOTES
[facility] - Telemetry Stepdown  Endocrinology  Progress Note    Admit Date: 4/11/2022     76 year old  male with T2DM, HTN, CAD, STEMI, HLD, paroxysmal Afib, CVA, seizures who presents for recurrent DKA.  He [place] to [facility] [place] on 4[place]11 with elevated blood glucose.  He was discharged from [facility] on 4/10 after being admitted for DKA on 4/5. Per family he was not feeling well after discharge to home and vomited several times on 4/11. Finger stick at home read High with unreadable blood glucose.  At this time daughter gave him 14 units of insulin and sublingual zofran. Other than malaise and nausea patient was not exhibiting any other signs/symptoms at home.    - In ER BG found to be 1015 with pCO2 6 and anion gap 35.  Hyperkalemic with K 7.0 and some EKG changes when compared to previous EKG.  Given calcium gluconate, and started on insulin gtt as well as subQ long acting insulin     - Endocrinology consulted for management of type 2 diabetes after resolution of DKA    - Spoke with daughter who states patient was discharged on 04/10/2022 with high glucose in the 400s which worsened after getting home and eating dinner; appropriate mealtime and correction insulin were given at that time. However, the next day patient's condition was worsening and they called EMS and his sugar was found to be in the thousands. She expresses concern that he cannot be care for adequately at home any longer and wishes to pursue skilled nursing facility.    Regarding Diabetes Mellitus:    Recurring episodes of DKA  Surgical Procedure and Date: NA  Diabetes diagnosis year: >20 years  Home Diabetes Medications:  During recent SNF was on Aspart 8 TID and glargine 8 units daily with sliding scale  How often checking glucose at home? >4 x day   Diabetes Complications include: Hyperglycemia, Hypoglycemia , and Diabetic peripheral neuropathy   Complicating diabetes co morbidities: History of CVA      Interval HPI:   Glucose trend above goal  "72311, did not receive basal insulin yesterday roughly half dose total daily resulting in up trend overnight fasting and 300s this a.m.  Diet resumed  No correction dose administered early a.m.     Overnight events: none  Eatin%  Nausea: yes  Hypoglycemia and intervention: No  Fever: No  TPN and/or TF: No  If yes, type of TF/TPN and rate: na    /60 (BP Location: Left arm, Patient Position: Lying)   Pulse 64   Temp 98.5 °F (36.9 °C) (Oral)   Resp 15   Ht 5' 11" (1.803 m)   Wt 81.6 kg (179 lb 14.3 oz)   SpO2 95%   BMI 25.09 kg/m²     Labs Reviewed and Include    No results for input(s): GLU, CALCIUM, ALBUMIN, PROT, NA, K, CO2, CL, BUN, CREATININE, ALKPHOS, ALT, AST, BILITOT in the last 24 hours.  Lab Results   Component Value Date    WBC 5.46 2022    HGB 11.6 (L) 2022    HCT 35.5 (L) 2022    MCV 92 2022     2022     No results for input(s): TSH, FREET4 in the last 168 hours.  Lab Results   Component Value Date    HGBA1C 9.7 (H) 2022       Nutritional status:   Body mass index is 25.09 kg/m².  Lab Results   Component Value Date    ALBUMIN 2.6 (L) 2022    ALBUMIN 2.4 (L) 2022    ALBUMIN 2.4 (L) 2022     No results found for: PREALBUMIN    Estimated Creatinine Clearance: 81.1 mL/min (based on SCr of 0.8 mg/dL).    Accu-Checks  Recent Labs     22  1544 22  2032 22  0342 22  0401 22  0741 22  1513 22  2344 22  0501 22  0739   POCTGLUCOSE 161* 112* 49* 113* 72 348* 227* 274* 300* 311*       Current Medications and/or Treatments Impacting Glycemic Control  Immunotherapy:    Immunosuppressants       None          Steroids:   Hormones (From admission, onward)                Start     Stop Route Frequency Ordered    22 1047  melatonin tablet 6 mg  (Medication Panel)         -- Oral Nightly PRN 22 0948          Pressors:    Autonomic Drugs (From admission, onward) "                None          Hyperglycemia/Diabetes Medications:   Antihyperglycemics (From admission, onward)                Start     Stop Route Frequency Ordered    22 1645  insulin aspart U-100 pen 12 Units         -- SubQ with dinner 22 0941    22 1000  insulin detemir U-100 pen 6 Units         -- SubQ 2 times daily 22 0945    22 1130  insulin aspart U-100 pen 16 Units         -- SubQ with lunch 22 1614    22 0715  insulin aspart U-100 pen 16 Units         -- SubQ with breakfast 22 1614    22 0931  insulin aspart U-100 pen 1-10 Units         -- SubQ Before meals & nightly PRN 22 0832    22 0111  insulin aspart U-100 pen 4 Units         -- SubQ With snacks 22 0012            ASSESSMENT and PLAN    Ketosis-prone diabetes mellitus  Key History and Diagnostic Findings  - Admit for recurrent DKA  - A1c of 9.7 on 2022 from 11.5 on 2022    - Home Regimen: Levemir 8 units daily, aspart 8 units TIDWM   - Weight based dosin kg x 0.5 = 41 TDD x 0.5 = 20 basal / 20 prandial   - 1700/TDD = 41 (estimated insulin sensitivity factor)   - 450/TDD = 11 (estimated starting carb ratio for prandial dosing)    -  Glucose Goals: 140-180mg/dL  -  24 hour glucose trend:  -  Variable diet %, inconsistent carbs with meals, diet indiscretions, intermittent nausea   -  Boosts with meals    -  Hypoglycemia on Levemir 8 units bid while npo for procedure  -  S/p GES normal    -  Missed yesterday basal am resulting in 50% reduction fasting coverage with uptrend o/n and no early am correction with am 300s    Plan  -  Will modify correction throughout the day now that diet resumed   -  Levemir 6 units BID  -  Aspart 16 units BF, 16 units L, 12 units D  -  Moderate correction scale      Nausea  Numerous episodes of nausea (and vomiting at home) during this admit and with prior admits  -  Attempted medical therapy with PPI, carafate, seizure hx no  reglan, scheduled zofran  -  Primary suspects is multifactorial with GERD and poor gastric emptying suspected  -  GI with GES normal    Dyslipidemia associated with type 2 diabetes mellitus  - on atorvastatin 40 mg daily      Coronary artery disease  - Strive to optimize glucose control        Stewart Rashid MD  Endocrinology  [facility] - Telemetry Stepdown

## 2022-04-30 NOTE — RESPIRATORY THERAPY
RAPID RESPONSE RESPIRATORY CHART CHECK       Chart check completed, no concerns verbalized at this time, instructed to call [phone] for further concerns or assistance.

## 2022-04-30 NOTE — ASSESSMENT & PLAN NOTE
PT/OT recommends skilled nursing facility for ongoing therapy; patient acknowledges and agrees with the need for ongoing therapy but unfortunately he is required to pay a co-pay for further SNF days.    He feels his only options to go home with limited sitter assistance and home health; his son has made arrangements for him to go to his preferred facility, [facility] but the patient feels he cannot manage the financial implications of going there; patient's son is committed to getting patient to [facility] which is likely his safest disposition as patient needs 24/7 care.

## 2022-04-30 NOTE — ASSESSMENT & PLAN NOTE
Key History and Diagnostic Findings  - Admit for recurrent DKA  - A1c of 9.7 on 2022 from 11.5 on 2022    - Home Regimen: Levemir 8 units daily, aspart 8 units TIDWM   - Weight based dosin kg x 0.5 = 41 TDD x 0.5 = 20 basal / 20 prandial   - 1700/TDD = 41 (estimated insulin sensitivity factor)   - 450/TDD = 11 (estimated starting carb ratio for prandial dosing)    -  Glucose Goals: 140-180mg/dL  -  24 hour glucose trend:  -  Variable diet %, inconsistent carbs with meals, diet indiscretions, intermittent nausea   -  Boosts with meals    -  Hypoglycemia on Levemir 8 units bid while npo for procedure  -  S/p GES normal    -  Missed yesterday basal am resulting in 50% reduction fasting coverage with uptrend o/n and no early am correction with am 300s    Plan  -  Levemir 6 units BID  -  Aspart 16 units BF, 16 units L, 12 units D  -  Moderate correction scale

## 2022-04-30 NOTE — SUBJECTIVE & OBJECTIVE
"Interval HPI:   Glucose trend above goal , did not receive basal insulin yesterday roughly half dose total daily resulting in up trend overnight fasting and 300s this a.m.  Diet resumed  No correction dose administered early a.m.     Overnight events: none  Eatin%  Nausea: yes  Hypoglycemia and intervention: No  Fever: No  TPN and/or TF: No  If yes, type of TF/TPN and rate: na    /60 (BP Location: Left arm, Patient Position: Lying)   Pulse 64   Temp 98.5 °F (36.9 °C) (Oral)   Resp 15   Ht 5' 11" (1.803 m)   Wt 81.6 kg (179 lb 14.3 oz)   SpO2 95%   BMI 25.09 kg/m²     Labs Reviewed and Include    No results for input(s): GLU, CALCIUM, ALBUMIN, PROT, NA, K, CO2, CL, BUN, CREATININE, ALKPHOS, ALT, AST, BILITOT in the last 24 hours.  Lab Results   Component Value Date    WBC 5.46 2022    HGB 11.6 (L) 2022    HCT 35.5 (L) 2022    MCV 92 2022     2022     No results for input(s): TSH, FREET4 in the last 168 hours.  Lab Results   Component Value Date    HGBA1C 9.7 (H) 2022       Nutritional status:   Body mass index is 25.09 kg/m².  Lab Results   Component Value Date    ALBUMIN 2.6 (L) 2022    ALBUMIN 2.4 (L) 2022    ALBUMIN 2.4 (L) 2022     No results found for: PREALBUMIN    Estimated Creatinine Clearance: 81.1 mL/min (based on SCr of 0.8 mg/dL).    Accu-Checks  Recent Labs     22  1544 22  2032 22  0342 22  0401 22  0741 22  1513 22  2344 22  0501 22  0739   POCTGLUCOSE 161* 112* 49* 113* 72 348* 227* 274* 300* 311*       Current Medications and/or Treatments Impacting Glycemic Control  Immunotherapy:    Immunosuppressants       None          Steroids:   Hormones (From admission, onward)                Start     Stop Route Frequency Ordered    22 1047  melatonin tablet 6 mg  (Medication Panel)         -- Oral Nightly PRN 22 0943          Pressors:  "   Autonomic Drugs (From admission, onward)                None          Hyperglycemia/Diabetes Medications:   Antihyperglycemics (From admission, onward)                Start     Stop Route Frequency Ordered    04/30/22 1645  insulin aspart U-100 pen 12 Units         -- SubQ with dinner 04/30/22 0941    04/30/22 1000  insulin detemir U-100 pen 6 Units         -- SubQ 2 times daily 04/30/22 0945    04/29/22 1130  insulin aspart U-100 pen 16 Units         -- SubQ with lunch 04/28/22 1614    04/29/22 0715  insulin aspart U-100 pen 16 Units         -- SubQ with breakfast 04/28/22 1614    04/25/22 0931  insulin aspart U-100 pen 1-10 Units         -- SubQ Before meals & nightly PRN 04/25/22 0832    04/17/22 0111  insulin aspart U-100 pen 4 Units         -- SubQ With snacks 04/17/22 0012

## 2022-04-30 NOTE — ASSESSMENT & PLAN NOTE
Numerous episodes of nausea (and vomiting at home) during this admit and with prior admits  -  Attempted medical therapy with PPI, carafate, seizure hx no reglan, scheduled zofran  -  Primary suspects is multifactorial with GERD and poor gastric emptying suspected  -  GI with GES normal

## 2022-04-30 NOTE — PLAN OF CARE
Alert and oriented x4. Vitals stable. BG monitored. Free from falls and injuries during shift. No concerns or requests voiced. Bed low with side rails up x2. Call bell within reach. Will continue plan of care.

## 2022-05-01 LAB
POCT GLUCOSE: 193 MG/DL (ref 70–110)
POCT GLUCOSE: 200 MG/DL (ref 70–110)
POCT GLUCOSE: 249 MG/DL (ref 70–110)
POCT GLUCOSE: 279 MG/DL (ref 70–110)

## 2022-05-01 PROCEDURE — 25000003 PHARM REV CODE 250: Performed by: INTERNAL MEDICINE

## 2022-05-01 PROCEDURE — 99232 SBSQ HOSP IP/OBS MODERATE 35: CPT | Mod: GC,,, | Performed by: INTERNAL MEDICINE

## 2022-05-01 PROCEDURE — 20600001 HC STEP DOWN PRIVATE ROOM

## 2022-05-01 PROCEDURE — 99232 PR SUBSEQUENT HOSPITAL CARE,LEVL II: ICD-10-PCS | Mod: GC,,, | Performed by: INTERNAL MEDICINE

## 2022-05-01 PROCEDURE — 99232 PR SUBSEQUENT HOSPITAL CARE,LEVL II: ICD-10-PCS | Mod: 95,,, | Performed by: INTERNAL MEDICINE

## 2022-05-01 PROCEDURE — 90792 PR PSYCHIATRIC DIAGNOSTIC EVALUATION W/MEDICAL SERVICES: ICD-10-PCS | Mod: ,,, | Performed by: PSYCHIATRY & NEUROLOGY

## 2022-05-01 PROCEDURE — 90792 PSYCH DIAG EVAL W/MED SRVCS: CPT | Mod: ,,, | Performed by: PSYCHIATRY & NEUROLOGY

## 2022-05-01 PROCEDURE — 97116 GAIT TRAINING THERAPY: CPT | Mod: CQ

## 2022-05-01 PROCEDURE — 99232 SBSQ HOSP IP/OBS MODERATE 35: CPT | Mod: 95,,, | Performed by: INTERNAL MEDICINE

## 2022-05-01 RX ORDER — INSULIN ASPART 100 [IU]/ML
18 INJECTION, SOLUTION INTRAVENOUS; SUBCUTANEOUS
Status: DISCONTINUED | OUTPATIENT
Start: 2022-05-01 | End: 2022-05-02

## 2022-05-01 RX ORDER — SERTRALINE HYDROCHLORIDE 25 MG/1
25 TABLET, FILM COATED ORAL DAILY
Status: DISCONTINUED | OUTPATIENT
Start: 2022-05-02 | End: 2022-05-03 | Stop reason: HOSPADM

## 2022-05-01 RX ORDER — INSULIN ASPART 100 [IU]/ML
18 INJECTION, SOLUTION INTRAVENOUS; SUBCUTANEOUS
Status: DISCONTINUED | OUTPATIENT
Start: 2022-05-02 | End: 2022-05-02

## 2022-05-01 RX ADMIN — SUCRALFATE 1 G: 1 SUSPENSION ORAL at 05:05

## 2022-05-01 RX ADMIN — APIXABAN 5 MG: 5 TABLET, FILM COATED ORAL at 08:05

## 2022-05-01 RX ADMIN — INSULIN ASPART 4 UNITS: 100 INJECTION, SOLUTION INTRAVENOUS; SUBCUTANEOUS at 08:05

## 2022-05-01 RX ADMIN — SUCRALFATE 1 G: 1 SUSPENSION ORAL at 08:05

## 2022-05-01 RX ADMIN — INSULIN ASPART 18 UNITS: 100 INJECTION, SOLUTION INTRAVENOUS; SUBCUTANEOUS at 11:05

## 2022-05-01 RX ADMIN — ONDANSETRON 4 MG: 4 TABLET, ORALLY DISINTEGRATING ORAL at 11:05

## 2022-05-01 RX ADMIN — SENNOSIDES AND DOCUSATE SODIUM 1 TABLET: 50; 8.6 TABLET ORAL at 08:05

## 2022-05-01 RX ADMIN — ACETAMINOPHEN 500 MG: 500 TABLET ORAL at 05:05

## 2022-05-01 RX ADMIN — ATORVASTATIN CALCIUM 40 MG: 20 TABLET, FILM COATED ORAL at 08:05

## 2022-05-01 RX ADMIN — SUCRALFATE 1 G: 1 SUSPENSION ORAL at 04:05

## 2022-05-01 RX ADMIN — DOCUSATE SODIUM 50 MG: 50 CAPSULE, LIQUID FILLED ORAL at 08:05

## 2022-05-01 RX ADMIN — POLYETHYLENE GLYCOL 3350 17 G: 17 POWDER, FOR SOLUTION ORAL at 08:05

## 2022-05-01 RX ADMIN — SUCRALFATE 1 G: 1 SUSPENSION ORAL at 11:05

## 2022-05-01 RX ADMIN — INSULIN ASPART 16 UNITS: 100 INJECTION, SOLUTION INTRAVENOUS; SUBCUTANEOUS at 08:05

## 2022-05-01 RX ADMIN — TAMSULOSIN HYDROCHLORIDE 0.4 MG: 0.4 CAPSULE ORAL at 08:05

## 2022-05-01 RX ADMIN — CLOPIDOGREL 75 MG: 75 TABLET, FILM COATED ORAL at 08:05

## 2022-05-01 RX ADMIN — Medication 6 MG: at 08:05

## 2022-05-01 RX ADMIN — METOPROLOL SUCCINATE 50 MG: 50 TABLET, EXTENDED RELEASE ORAL at 08:05

## 2022-05-01 RX ADMIN — PANTOPRAZOLE SODIUM 40 MG: 40 TABLET, DELAYED RELEASE ORAL at 08:05

## 2022-05-01 RX ADMIN — INSULIN ASPART 12 UNITS: 100 INJECTION, SOLUTION INTRAVENOUS; SUBCUTANEOUS at 06:05

## 2022-05-01 RX ADMIN — LACOSAMIDE 100 MG: 100 TABLET, FILM COATED ORAL at 08:05

## 2022-05-01 RX ADMIN — AMIODARONE HYDROCHLORIDE 200 MG: 200 TABLET ORAL at 08:05

## 2022-05-01 NOTE — PT/OT/SLP PROGRESS
"Physical Therapy Treatment    Patient Name:  Kamar Muñoz   MRN:  [id number]    Recommendations:     Discharge Recommendations:  nursing facility, skilled   Discharge Equipment Recommendations: other (see comments) (tbd)   Barriers to discharge: Decreased caregiver support    Assessment:     Kamar Muñoz is a 76 y.o. male admitted with a medical diagnosis of Pulmonary cavitary lesion.  He presents with the following impairments/functional limitations:  weakness, impaired endurance, decreased coordination, pain, decreased safety awareness.  Pt tolerated today's therapy session well. Pt increased ambulation distance, 2x 100ft with RW and CGA-close SBA. 1 standing rest break and no LOB noted. Pt continues to need occasional verbal cues to keep RW close. Pt is progressing well and continues to benefit from therapy to achieve highest level of independence prior to discharge.     Rehab Prognosis: Good; patient would benefit from acute skilled PT services to address these deficits and reach maximum level of function.    Recent Surgery: * No surgery found *      Plan:     During this hospitalization, patient to be seen 4 x/week to address the identified rehab impairments via gait training, therapeutic activities, therapeutic exercises, neuromuscular re-education and progress toward the following goals:    · Plan of Care Expires:  05/04/22    Subjective     Chief Complaint: chronic back pain   Patient/Family Comments/goals: "my left side weaker"  Pain/Comfort:  · Pain Rating 1: 7/10  · Location - Orientation 1: lower  · Location 1: back  · Pain Addressed 1: Reposition, Distraction, Pre-medicate for activity  · Pain Rating Post-Intervention 1: 7/10      Objective:     Communicated with RN prior to session.  Patient found HOB elevated with  (no active lines) upon PT entry to room.     General Precautions: Standard, diabetic, fall   Orthopedic Precautions:N/A   Braces: N/A  Respiratory Status: Room air     Functional " Mobility:  · Bed Mobility:     · Supine to Sit: contact guard assistance with HOB elevated   · To the left, pt report weaker on left side. Pt able to complete with increase time   · Sit to Supine: stand by assistance  · Transfers:     · Sit to Stand:  stand by assistance with rolling walker  · Stand to sit: contact guard assistance with rolling walker  · Verbal cues to bring RW back along bed and reach back prior to sitting down.   · Gait: pt ambulated 2x 100ft with CGA-close SBA and RW. Pt demonstrated mostly steady gait with flexed posture, decrease cristofer, and decrease heel strike. 1 standing rest break and no LOB noted. Verbal cues for upright posture and keep RW close.   Balance:   · Static/Dynamic sitting EOB balance: good, supervision x5 minutes  · Static standing balance: CGA and RW for safety     AM-PAC 6 CLICK MOBILITY  Turning over in bed (including adjusting bedclothes, sheets and blankets)?: 4  Sitting down on and standing up from a chair with arms (e.g., wheelchair, bedside commode, etc.): 3  Moving from lying on back to sitting on the side of the bed?: 3  Moving to and from a bed to a chair (including a wheelchair)?: 3  Need to walk in hospital room?: 3  Climbing 3-5 steps with a railing?: 2  Basic Mobility Total Score: 18       Therapeutic Activities and Exercises:   Seated BLE therex x15 reps: ankle pumps and LAQ   Standing BLE therex x15 reps: marching   o CGA and RW for safety  Patient educated on role of therapy, goals of session, and benefits of out of bed mobility.   Instructed on use of call button and importance of calling nursing staff for assistance with mobility   Questions/concerns addressed within PTA scope of practice  Pt verbalized understanding.      Patient left HOB elevated with all lines intact and call button in reach..    GOALS:   Multidisciplinary Problems     Physical Therapy Goals        Problem: Physical Therapy    Goal Priority Disciplines Outcome Goal Variances  Interventions   Physical Therapy Goal     PT, PT/OT Ongoing, Progressing     Description: Goals to met by 4/29/2022     1. Supine to sit with Modified [place]  2. Sit to supine with Modified [place]  3. Sit to stand transfer with Modified [place]  4. Bed to chair transfer with Modified [place] using Rolling Walker  5. Gait  x 150 feet with Stand-by Assistance using Rolling Walker   6. Ascend/Descend 6 inch curb step with Contact Guard Assistance using LRAD.  7. Lower extremity exercise program x15 reps per Instruction, with supervision in order to facilitate improvement in functional independence                     Time Tracking:     PT Received On: 05/01/22  PT Start Time: 1501     PT Stop Time: 1517  PT Total Time (min): 16 min     Billable Minutes: Gait Training 16    Treatment Type: Treatment  PT/PTA: PTA     PTA Visit Number: 1     05/01/2022

## 2022-05-01 NOTE — ASSESSMENT & PLAN NOTE
Key History and Diagnostic Findings  - Admit for recurrent DKA  - A1c of 9.7 on 2022 from 11.5 on 2022    - Home Regimen: Levemir 8 units daily, aspart 8 units TIDWM   - Weight based dosin kg x 0.5 = 41 TDD x 0.5 = 20 basal / 20 prandial   - 1700/TDD = 41 (estimated insulin sensitivity factor)   - 450/TDD = 11 (estimated starting carb ratio for prandial dosing)    -  Glucose Goals: 140-180mg/dL  -  24 hour glucose trend: 200-330 no lows  -  Variable diet %, inconsistent carbs with meals, diet indiscretions, intermittent nausea, variable diet/po intake  -  Boosts with meals  -  S/p GES normal    Plan  -  Levemir 6 units BID (fasting hypoglycemia with 8 units bid)  -  Aspart 18 units BF, 18 units L, 12 units D  -  Would likely better benefit from Carb Ratio instead with carb counting due to dietary indiscretion and variable intake and boosts with meals  -  Prandial insulin with range resulted in large fluctuations of global hyperglycemia and hypoglycemia, overcorrections  -  Moderate correction scale

## 2022-05-01 NOTE — CONSULTS
[facility] - Telemetry Stepdown  Psychiatry  Consult Note    Patient Name: Kamar Muñoz  MRN: [id number]   Code Status: DNR  Admission Date: 4/11/2022  Hospital Length of Stay: 20 days  Attending Physician: Tiffany Awad MD  Primary Care Provider: Basim Guerrero MD    Current Legal Status: Uncontested    Patient information was obtained from patient, past medical records and ER records.   Inpatient consult to Psychiatry  Consult performed by: April Zamora MD  Consult ordered by: Tiffany Awad MD        Subjective:     Principal Problem:Pulmonary cavitary lesion    Chief Complaint:  as above    HPI:   5/1/2022 3:54 PM  Kamar Muñoz  1945  [id number]    Capacity Evaluation    History of Present Illness     Kamar Muñoz is a 76 y.o. male with a no known past psychiatric history, currently presenting with Pulmonary cavitary lesion.  Psychiatry was originally consulted to address the patient's capacity to participate in safe discharge planning.     Per Primary MD:  Mr. Kamar Muñoz is a 76 y.o. male with a past medical history of HTN, Type 2 DM, CAD, STEMI, HLD, paroxysmal Afib, CVA, seizures and recurrent DKA.  He presented to [facility] ED on 4/11 with elevated blood glucose.  He was discharged from [facility] on 4/10 after being admitted for DKA on 4/5.  At time of exam patient is alert but altered and unable to provide any history.  Spoke with patient's daughter who states she is a primary caregiver at home and obtained history as well as review of chart.  Per family he was not feeling well after discharge to home and vomited several times on 4/11. Unknown characteristics of emesis. Finger stick at home read High with unreadable blood glucose.  At this time daughter gave him 14 units of insulin and sublingual zofran. Other than malaise and nausea patient was not exhibiting any other signs/symptoms at home.  In ER BG found to be 1015 with pCO2 6 and anion gap 35.  Hyperkalemic  "with K 7.0 and some EKG changes when compared to previous EKG.  Given calcium gluconate, and started on insulin gtt as well as subQ long acting insulin.     Discharge planning issues  PT/OT recommends skilled nursing facility for ongoing therapy; patient acknowledges and agrees with the need for ongoing therapy but unfortunately he is required to pay a co-pay for further SNF days.    He feels his only options to go home with limited sitter assistance and home health; his son has made arrangements for him to go to his preferred facility, [facility] but the patient feels he cannot manage the financial implications of going there; patient's son is committed to getting patient to [facility] which is likely his safest disposition as patient needs 24/7 care.  Patient continues to require 24/7 care for safety, ambulation, meal preparation, and medication management. Patient currently requesting to remain in hospital, refuses NH placement, and does not have adequate 24/7 care in place for safe discharge to home. Family continues to support NH placement at [facility].   Capacity for participation in discharge plan unclear; concern for depression and reluctance to leave his seriously ill wife hospitalized in this hospital.   Consulting Psychiatry for capacity assessment, evaluation of depression, capacity for choosing palliative care vs. aggressive medical management of his chronic illnesses. Patient has requested DNR status.    Per [facility]:  On interview, pt was sitting up in bed, getting ready to eat breakfast. He was AAOx4, calm, and cooperative. He stated that he feels "much better," but (the primary team) is trying to work with him to figure out a safe dispo. He stated that he was living at home with his wife, who is also currently hospitalized. He endorsed managing his medications on his own, and has been independent with ADL's. He endorsed issues with his blood sugar and "weakness," causing him " "to present to the hospital so frequently. He stated that he has home health that comes by 3x/week. He is in agreement that he needs 24/7 care, but does not want to be discharged to a nursing home. 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 He also endorsed financial concerns. He stated that his family is trying to encourage him to consider nursing home or assisted living placement. His daughter is currently looking into assisted living options; he endorsed being willing to live in an assisted living facility, but is unsure if he could afford it. He stated that his family members are unlikely to be able to help out financially, as they have their own lives to deal with.   He denied any previous psychiatric history. Endorsed depressed mood for the past two weeks in the context of his and his wife's recent health issues. He endorsed occasional difficulties with sleep, but denied any other depressive sx. Denied any history of martin. Denied SI/HI/AVH.   Denied any alcohol or other recreational substance use.   He is open to starting a medication to help with depressed mood.     Mental Status Exam     CAM-ICU:  · Acute change and/or fluctuating course of mental status: No  · Inattention (SAVEAHAART): No  · "Squeeze my hand, only when you hear, the letter 'A'."  · Altered Level of Consciousness: No  · Disorganized Thinking (Errors >1/6): No  · "Will a stone float on water?"  · "Are there fish in the sea?"  · "Does one pound weigh more than two?"  · "Can you use a hammer to pound a nail?"  · Command(s):  · "Hold up 2 fingers."  · "Now do the same thing with the other hand."    Score: 1+2 AND, either 3 or 4 present = Positive CAM-ICU    Appearance: age appropriate, fair hygiene/grooming, sitting up in bed   Level of Consciousness: alert, awake   Grooming:  appropriate to situation   Psychomotor Behavior: cooperative, eye " "contact normal   Speech: normal tone, normal rate, normal pitch, normal volume   Language: english, fluid   Orientation:   person, place, situation, day of week, month of year, year   Attention Span/Concentration: Intact to conversation   Memory: Recent and remote intact   Mood: "depressed"   Affect: Appropriate, mood-congruent   Thought Process: Linear, goal-directed   Associations: normal and logical   Thought Content: normal, no suicidality, no homicidality, delusions, or paranoia   Fund of Knowledge: adequate to education level   Insight: good   Judgment:  good     Capacity Evaluation     Capacity question:  · Does the patient possess the ability to make an informed decision, regarding the proposed treatment/option for his care (to participate in planning a safe discharge)?    Capacity for a given decision requires:  · Understanding - the ability to state the meaning of the relevant information (eg, diagnosis, risks and benefits of a treatment or procedure, indications, and options of care).  o The patient must be able to understand the proposed treatment, and/or options for his care.   Appreciation - the ability to explain how information (eg, diagnosis, benefit, and risk) applies to oneself.  o The patient must be able to appreciate his own medical situation, and abstract, as to how the treatments/proposed options for care, apply to his medical situation.   Reasoning - the ability to compare information and infer consequences of choice.  o The patient must be able to understand the consequences of his medical decision, in a reasonable manner, supported by the facts, and his own values, in a consistent fashion.   Expressing a choice - the ability to state a decision.  o The patient must demonstrate the ability to communicate a choice, clearly and consistently.    Assessment of capacity:  · The patient understands the clinical condition relation to this evaluation: Yes.  · The patient understands the " indication and nature of/reasoning behind the proposed treatment(s) and/or options for his care: Yes.  · The patient understands and appreciates the risks and benefits of the proposed treatment(s) and/or options for his care: Yes.  · The patient understands and appreciates the risks and benefits of refusing the proposed treatment(s) and/or options for his care: Yes.  · The patient is in in agreement with the assessment and consents to working with primary team and family to figure out a safe dispo.  · The patient has evidence of impaired insight and/or judgment: No.    Assessment/Plan:     ASSESSMENT     Kamar Muñoz is a 76 y.o. male with a no known past psychiatric history, currently presenting with Pulmonary cavitary lesion.  Psychiatry was originally consulted to address the patient's capacity to participate in safe discharge planning.     IMPRESSION  Adjustment disorder, with depressed mood    Recommendations     · Based upon my evaluation of Kamar Muñoz regarding his medical decision-making capacity for participating in safe discharge planning, I found with reasonable certainty that Kamar Muñoz does have capacity to make medical decisions on his behalf.    Case discussed with: Dr. Reaves    1. Scheduled Medication(s):  - Start Zoloft 25mg daily, for depressive sx    2. PRN Medication(s):  - None    3.  Monitor:  Please obtain daily EKG to monitor QTc    4. Legal Status/Precaution(s):  Patient does not meet criteria for PEC or inpatient psychiatric admission at this time. Recommend to rescind PEC if one was placed. Patient is not currently an imminent danger to self or others and is not gravely disabled due to a psychiatric illness.       Total Time:  60 minutes      April Zamora MD   Psychiatry  [facility] - Telemetry Stepdown

## 2022-05-01 NOTE — SUBJECTIVE & OBJECTIVE
Telemedicine  This service was provided by Virtual Visit.    Patient was transferred to [facility] on:  04/22/2022     Chief Complaint   Patient presents with    Altered Mental Status     The patient location is: 8092/8092 A   Admitted 4/11/2022  8:55 PM  Present with the patient at the time of the telemed/virtual assessment: Telepresenter    Interval History / Events Overnight:   The patient is able to provide adequate history. Additional history was obtained from past medical records. No significant events reported by Nursing.    Patient complains of feeling weak. Symptoms have been unchanged since yesterday. Associated symptoms include: fatigue. Symptoms are stable.     Lab test(s) reviewed: hyperglycemia    Review of Systems   Constitutional:  Negative for fever.   Respiratory:  Negative for shortness of breath.    Musculoskeletal:  Positive for gait problem.   Neurological:  Positive for weakness.     Objective:     Vital Signs (Most Recent):  Temp: 97.5 °F (36.4 °C) (05/01/22 0803)  Pulse: (!) 52 (05/01/22 0803)  Resp: 20 (05/01/22 0803)  BP: (!) 165/72 (05/01/22 0803)  SpO2: 95 % (05/01/22 0803)   Vital Signs (24h Range):  Temp:  [97.5 °F (36.4 °C)-98.3 °F (36.8 °C)] 97.5 °F (36.4 °C)  Pulse:  [52-76] 52  Resp:  [18-20] 20  SpO2:  [93 %-99 %] 95 %  BP: (116-165)/(58-84) 165/72     Weight: 81.6 kg (179 lb 14.3 oz)  Body mass index is 25.09 kg/m².    Intake/Output Summary (Last 24 hours) at 5/1/2022 1009  Last data filed at 5/1/2022 0848  Gross per 24 hour   Intake 400 ml   Output --   Net 400 ml        Physical Exam  Constitutional:       General: He is not in acute distress.     Appearance: Normal appearance.   Eyes:      General: Lids are normal. No scleral icterus.        Right eye: No discharge.         Left eye: No discharge.      Conjunctiva/sclera: Conjunctivae normal.   Neck:      Trachea: Phonation normal.   Cardiovascular:      Rate and Rhythm: Bradycardia present.      Comments:  Monitor / Vital signs reviewed at time of visit  Pulmonary:      Effort: Pulmonary effort is normal. No tachypnea, accessory muscle usage or respiratory distress.   Abdominal:      General: There is no distension.   Skin:     Coloration: Skin is not cyanotic.   Neurological:      Mental Status: He is alert. He is not disoriented.   Psychiatric:         Attention and Perception: Attention normal.         Mood and Affect: Affect normal. Mood is depressed.         Behavior: Behavior is cooperative.       Significant Labs:   Recent Labs   Lab 12/29/21  0810 01/26/22  1512 04/05/22  1138   HGBA1C 12.3* 11.5* 9.7*       Recent Labs   Lab 04/30/22  2036 04/30/22  2155 05/01/22  0833   POCTGLUCOSE 336* 250* 200*       Recent Labs   Lab 04/25/22 0407 04/27/22 0459 04/29/22  0335   WBC 7.29 6.86 5.46   HGB 11.4* 11.2* 11.6*   HCT 34.4* 35.1* 35.5*    243 227       Recent Labs   Lab 04/25/22 0407 04/27/22 0459 04/29/22  0335   GRAN 58.2  4.2 53.1  3.6 49.6  2.7   LYMPH 28.4  2.1 31.5  2.2 31.5  1.7   MONO 7.0  0.5 8.6  0.6 11.4  0.6   EOS 0.4 0.4 0.4       Recent Labs   Lab 04/25/22 0407 04/27/22 0459 04/29/22  0335    135* 138   K 4.5 4.0 3.7    100 103   CO2 26 27 25   BUN 21 20 17   CREATININE 0.9 0.8 0.8   * 135* 40*   CALCIUM 8.3* 8.4* 8.5*   ALBUMIN 2.4* 2.4* 2.6*   MG 1.5* 1.5* 1.9   PHOS 2.9 4.1 2.6*       Recent Labs   Lab 04/25/22 0407 04/27/22 0459 04/29/22  0335   ALKPHOS 103 97 97   ALT 36 38 54*   AST 52* 47* 88*   PROT 5.5* 5.6* 5.8*   BILITOT 0.5 0.5 0.4       Recent Labs   Lab 02/19/22  2242 02/20/22  0046 03/03/22  2113 03/05/22  1037 03/05/22  1939 03/07/22 2043 04/11/22  2236 04/12/22  0207 04/12/22  0618 04/12/22  1051 04/12/22  1324   PROCAL 0.93*  --   --   --   --   --   --  1.18*  --   --   --    LACTATE  --    < >  --    < > 1.0  --    < > 8.2* 4.2* 3.2* 2.0   DDIMER 0.84*   < > 0.50*  --  0.50* 0.43  --   --   --   --   --    FERRITIN 564*   < > 300  --   354* 334*  --   --   --   --   --    SEDRATE 92*  --   --   --   --   --   --   --   --   --   --     < > = values in this interval not displayed.       Results for orders placed or performed in visit on 05/05/14   Vitamin D   Result Value Ref Range    Vit D, 25-Hydroxy 24 (L) 30 - 96 ng/mL     SARS-CoV2 (COVID-19) Qualitative PCR (no units)   Date Value   03/21/2022 Not Detected   03/17/2022 Not Detected   03/14/2022 Not Detected   03/08/2022 Not Detected     POC Rapid COVID (no units)   Date Value   04/05/2022 Negative   02/17/2022 Positive (A)   01/25/2022 Negative   01/12/2022 Negative   01/09/2022 Negative       ECG Results              EKG 12-lead (Final result)  Result time 04/12/22 12:51:09      Final result by Interface, Lab In [facility] (04/12/22 12:51:09)                   Narrative:    Test Reason : R73.9,    Vent. Rate : 118 BPM     Atrial Rate : 111 BPM     P-R Int : 000 ms          QRS Dur : 162 ms      QT Int : 436 ms       P-R-T Axes : 000 -14 052 degrees     QTc Int : 611 ms    Wide QRS rhythm  Right bundle branch block  ST elevation anterior leads differential includes anterior STEMI vs,  repolarization abnormality  Abnormal ECG  When compared with ECG of 05-APR-2022 11:41,  Wide QRS rhythm has replaced Sinus rhythm  Vent. rate has increased BY  48 BPM  Confirmed by Megha Stock MD ([id number]) on 4/12/2022 12:51:00 PM    Referred By: [email or web address]   SELF           Confirmed By:Megha Stock MD                                    Results for orders placed during the hospital encounter of 01/25/22    Echo    Interpretation Summary  · There is abnormal septal wall motion.  · The left ventricle is normal in size with low normal systolic function.  · The estimated ejection fraction is 50%.  · Normal left ventricular diastolic function.  · Mild left atrial enlargement.  · Normal right ventricular size with normal right ventricular systolic function.  · Mild mitral regurgitation.  · There are segmental left  ventricular wall motion abnormalities.  · Mild tricuspid regurgitation.  · Elevated central venous pressure (15 mmHg).      NM Gastric Emptying  Narrative: EXAMINATION:  NM GASTRIC EMPTYING    CLINICAL HISTORY:  chronic nausea in diabetic;    TECHNIQUE:  The patient received 1.0 mCi orally of sulfur colloid labeled meal in less than 10 minutes.    Anterior and posterior projection images were obtained immediately and at 60 minute intervals for 4 hours.    Geometric mean of the anterior and the posterior images was generated. The decay-corrected time activity curve and the percentage of the retention and emptying were obtained.    COMPARISON:  CT abdomen pelvis 04/13/2022.    FINDINGS:  At 4 hour(s) the percentage of retention is 2 % (normal retention at 4 hours is 10% and lower).  Impression: Normal gastric emptying time.    I, Bam Booth MD, attest that I reviewed and interpreted the images.    Electronically signed by resident: Cisco Alvarado MD  Date:    04/29/2022  Time:    14:09    Electronically signed by: Bam Booth  Date:    04/29/2022  Time:    14:18      Labs and Imaging within the last 24 hours listed above were reviewed.       Diet: Diet diabetic [facility]; 2000 Calorie  Significant LDAs:   IV Access Type: Peripheral  Urinary Catheter Indication if present: Patient Does Not Have Urinary Catheter  Other Lines/Tubes/Drains:         Goals of Care:    Previous admission:  4/5/22  Likely prognosis:  Fair  Code Status: DNR  Comfort Only: No  Hospice: No  Goals at discharge: remain at home, with physician follow-up    Discharge Planning   ALILE: 5/3/2022     Code Status: DNR   Is the patient medically ready for discharge?: Yes    Reason for patient still in hospital (select all that apply): Patient trending condition and Consult recommendations  Discharge Plan A: Home, Home Health, Other (24/7 sitters and family support)   Discharge Delays: None known at this time

## 2022-05-01 NOTE — PLAN OF CARE
Problem: Pain Acute  Goal: Acceptable Pain Control and Functional Ability  Outcome: Ongoing, Progressing     Problem: Diabetes Comorbidity  Goal: Blood Glucose Level Within Targeted Range  Outcome: Ongoing, Progressing

## 2022-05-01 NOTE — PROGRESS NOTES
[facility] - Tele[facility] Stepdown  Endocrinology  Progress Note    Admit Date: 4/11/2022     76 year old  male with T2DM, HTN, CAD, STEMI, HLD, paroxysmal Afib, CVA, seizures who presents for recurrent DKA.  He presented to [facility] ED on 4/11 with elevated blood glucose.  He was discharged from [facility] on 4/10 after being admitted for DKA on 4/5. Per family he was not feeling well after discharge to home and vomited several times on 4/11. Finger stick at home read High with unreadable blood glucose.  At this time daughter gave him 14 units of insulin and sublingual zofran. Other than malaise and nausea patient was not exhibiting any other signs/symptoms at home.    - In ER BG found to be 1015 with pCO2 6 and anion gap 35.  Hyperkalemic with K 7.0 and some EKG changes when compared to previous EKG.  Given calcium gluconate, and started on insulin gtt as well as subQ long acting insulin     - Endocrinology consulted for management of type 2 diabetes after resolution of DKA    - Spoke with daughter who states patient was discharged on 04/10/2022 with high glucose in the 400s which worsened after getting home and eating dinner; appropriate mealtime and correction insulin were given at that time. However, the next day patient's condition was worsening and they called EMS and his sugar was found to be in the thousands. She expresses concern that he cannot be care for adequately at home any longer and wishes to pursue skilled nursing facility.    Regarding Diabetes Mellitus:    Recurring episodes of DKA  Surgical Procedure and Date: NA  Diabetes diagnosis year: >20 years  Home Diabetes Medications:  During recent SNF was on Aspart 8 TID and glargine 8 units daily with sliding scale  How often checking glucose at home? >4 x day   Diabetes Complications include: Hyperglycemia, Hypoglycemia , and Diabetic peripheral neuropathy   Complicating diabetes co morbidities: History of CVA      Interval HPI:    Glucose trend continues to  "remain above goal with broad fluctuations in glucose  Glucose trend 200-230  Mismatch likely due to dietary indiscretion with variable po intake  Overnight events: none  Eatin-100%  Nausea: No  Hypoglycemia and intervention: No  Fever: No  TPN and/or TF: No  If yes, type of TF/TPN and rate: na    BP (!) 165/72   Pulse (!) 52   Temp 97.5 °F (36.4 °C) (Oral)   Resp 20   Ht 5' 11" (1.803 m)   Wt 81.6 kg (179 lb 14.3 oz)   SpO2 95%   BMI 25.09 kg/m²     Labs Reviewed and Include    No results for input(s): GLU, CALCIUM, ALBUMIN, PROT, NA, K, CO2, CL, BUN, CREATININE, ALKPHOS, ALT, AST, BILITOT in the last 24 hours.  Lab Results   Component Value Date    WBC 5.46 2022    HGB 11.6 (L) 2022    HCT 35.5 (L) 2022    MCV 92 2022     2022     No results for input(s): TSH, FREET4 in the last 168 hours.  Lab Results   Component Value Date    HGBA1C 9.7 (H) 2022       Nutritional status:   Body mass index is 25.09 kg/m².  Lab Results   Component Value Date    ALBUMIN 2.6 (L) 2022    ALBUMIN 2.4 (L) 2022    ALBUMIN 2.4 (L) 2022     No results found for: PREALBUMIN    Estimated Creatinine Clearance: 81.1 mL/min (based on SCr of 0.8 mg/dL).    Accu-Checks  Recent Labs     22  1513 22  2010 22  2344 22  0501 22  0739 22  1125 22  1547 22  2036 22  2155 22  0833   POCTGLUCOSE 348* 227* 274* 300* 311* 300* 210* 336* 250* 200*       Current Medications and/or Treatments Impacting Glycemic Control  Immunotherapy:    Immunosuppressants       None          Steroids:   Hormones (From admission, onward)                Start     Stop Route Frequency Ordered    22 1047  melatonin tablet 6 mg  (Medication Panel)         -- Oral Nightly PRN 22 7245          Pressors:    Autonomic Drugs (From admission, onward)                None          Hyperglycemia/Diabetes Medications:   Antihyperglycemics (From " admission, onward)                Start     Stop Route Frequency Ordered    22 0715  insulin aspart U-100 pen 18 Units         -- SubQ with breakfast 22 0913    22 1130  insulin aspart U-100 pen 18 Units         -- SubQ with lunch 22 0913    22 1645  insulin aspart U-100 pen 12 Units         -- SubQ with dinner 22 0941    22 1000  insulin detemir U-100 pen 6 Units         -- SubQ 2 times daily 22 0945    22 0931  insulin aspart U-100 pen 1-10 Units         -- SubQ Before meals & nightly PRN 22 0832    22 0111  insulin aspart U-100 pen 4 Units         -- SubQ With snacks 22 0012            ASSESSMENT and PLAN    Ketosis-prone diabetes mellitus  Key History and Diagnostic Findings  - Admit for recurrent DKA  - A1c of 9.7 on 2022 from 11.5 on 2022    - Home Regimen: Levemir 8 units daily, aspart 8 units TIDWM   - Weight based dosin kg x 0.5 = 41 TDD x 0.5 = 20 basal / 20 prandial   - 1700/TDD = 41 (estimated insulin sensitivity factor)   - 450/TDD = 11 (estimated starting carb ratio for prandial dosing)    -  Glucose Goals: 140-180mg/dL  -  24 hour glucose trend: 200-330 no lows  -  Variable diet %, inconsistent carbs with meals, diet indiscretions, intermittent nausea, variable diet/po intake  -  Boosts with meals  -  S/p GES normal    Plan  -  Levemir 6 units BID (fasting hypoglycemia with 8 units bid)  -  Aspart 18 units BF, 18 units L, 12 units D  -  Would likely better benefit from Carb Ratio instead with carb counting due to dietary indiscretion and variable intake and boosts with meals  -  Prandial insulin with range resulted in large fluctuations of global hyperglycemia and hypoglycemia, overcorrections  -  Moderate correction scale          Nausea  Numerous episodes of nausea (and vomiting at home) during this admit and with prior admits  -  Attempted medical therapy with PPI, carafate, seizure hx no reglan,  scheduled zofran  -  Primary suspects is multifactorial with GERD and poor gastric emptying suspected  -  GI with GES normal    Dyslipidemia associated with type 2 diabetes mellitus  - on atorvastatin 40 mg daily      Coronary artery disease  - Strive to optimize glucose control        Stewart Rashid MD  Endocrinology  [facility] - Telemetry Stepdown

## 2022-05-01 NOTE — ASSESSMENT & PLAN NOTE
PT/OT recommends skilled nursing facility for ongoing therapy; patient acknowledges and agrees with the need for ongoing therapy but unfortunately he is required to pay a co-pay for further SNF days.    He feels his only options to go home with limited sitter assistance and home health; his son has made arrangements for him to go to his preferred facility, [facility] but the patient feels he cannot manage the financial implications of going there; patient's son is committed to getting patient to [facility] which is likely his safest disposition as patient needs 24/7 care.  Patient continues to require 24/7 care for safety, ambulation, meal preparation, and medication management. Patient currently requesting to remain in hospital, refuses NH placement, and does not have adequate 24/7 care in place for safe discharge to home. Family continues to support NH placement at [facility].   Capacity for participation in discharge plan unclear; concern for depression and reluctance to leave his seriously ill wife hospitalized in this hospital.   Consulting Psychiatry for capacity assessment, evaluation of depression, capacity for choosing palliative care vs. aggressive medical management of his chronic illnesses. Patient has requested DNR status.

## 2022-05-01 NOTE — ASSESSMENT & PLAN NOTE
- DKA now resolved  - Endocrine following and adjusting insulin regimen as needed  - SSI provided for corrective dosing  - Hypoglycemic protocol in effect  -Endocrine following due to erratic glucoses; notified of plan to discharge home in light of re-admits with DKA and eats regular diet at home  -patient needs assistance with 3 discrete meals per day and insulin management at home.

## 2022-05-01 NOTE — HPI
5/1/2022 3:54 PM  Kamar Muñoz  1945  [id number]    Capacity Evaluation    History of Present Illness     Kamar Muñoz is a 76 y.o. male with a no known past psychiatric history, currently presenting with Pulmonary cavitary lesion.  Psychiatry was originally consulted to address the patient's capacity to participate in safe discharge planning.     Per Primary MD:  Mr. Kamar Muñoz is a 76 y.o. male with a past medical history of HTN, Type 2 DM, CAD, STEMI, HLD, paroxysmal Afib, CVA, seizures and recurrent DKA.  He presented to [facility] ED on 4/11 with elevated blood glucose.  He was discharged from [facility] on 4/10 after being admitted for DKA on 4/5.  At time of exam patient is alert but altered and unable to provide any history.  Spoke with patient's daughter who states she is a primary caregiver at home and obtained history as well as review of chart.  Per family he was not feeling well after discharge to home and vomited several times on 4/11. Unknown characteristics of emesis. Finger stick at home read High with unreadable blood glucose.  At this time daughter gave him 14 units of insulin and sublingual zofran. Other than malaise and nausea patient was not exhibiting any other signs/symptoms at home.  In ER BG found to be 1015 with pCO2 6 and anion gap 35.  Hyperkalemic with K 7.0 and some EKG changes when compared to previous EKG.  Given calcium gluconate, and started on insulin gtt as well as subQ long acting insulin.     Discharge planning issues  PT/OT recommends skilled nursing facility for ongoing therapy; patient acknowledges and agrees with the need for ongoing therapy but unfortunately he is required to pay a co-pay for further SNF days.    He feels his only options to go home with limited sitter assistance and home health; his son has made arrangements for him to go to his preferred facility, [facility] but the patient feels he cannot manage the financial implications of going there;  "patient's son is committed to getting patient to [facility] which is likely his safest disposition as patient needs 24/7 care.  Patient continues to require 24/7 care for safety, ambulation, meal preparation, and medication management. Patient currently requesting to remain in hospital, refuses NH placement, and does not have adequate 24/7 care in place for safe discharge to home. Family continues to support NH placement at [facility].   Capacity for participation in discharge plan unclear; concern for depression and reluctance to leave his seriously ill wife hospitalized in this hospital.   Consulting Psychiatry for capacity assessment, evaluation of depression, capacity for choosing palliative care vs. aggressive medical management of his chronic illnesses. Patient has requested DNR status.    Per [facility]:  On interview, pt was sitting up in bed, getting ready to eat breakfast. He was AAOx4, calm, and cooperative. He stated that he feels "much better," but (the primary team) is trying to work with him to figure out a safe dispo. He stated that he was living at home with his wife, who is also currently hospitalized. He endorsed managing his medications on his own, and has been independent with ADL's. He endorsed issues with his blood sugar and "weakness," causing him to present to the hospital so frequently. He stated that he has home health that comes by 3x/week. He is in agreement that he needs 24/7 care, but does not want to be discharged to a nursing home. 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 He also endorsed financial concerns. He stated that his family is trying to encourage him to consider nursing home or assisted living placement. His daughter is currently looking into assisted living options; he endorsed being willing to live in an assisted living facility, but is unsure if he " "could afford it. He stated that his family members are unlikely to be able to help out financially, as they have their own lives to deal with.   He denied any previous psychiatric history. Endorsed depressed mood for the past two weeks in the context of his and his wife's recent health issues. He endorsed occasional difficulties with sleep, but denied any other depressive sx. Denied any history of martin. Denied SI/HI/AVH.   Denied any alcohol or other recreational substance use.   He is open to starting a medication to help with depressed mood.     Mental Status Exam     CAM-ICU:  Acute change and/or fluctuating course of mental status: No  Inattention (SAVEAHAART): No  "Squeeze my hand, only when you hear, the letter 'A'."  Altered Level of Consciousness: No  Disorganized Thinking (Errors >1/6): No  "Will a stone float on water?"  "Are there fish in the sea?"  "Does one pound weigh more than two?"  "Can you use a hammer to pound a nail?"  Command(s):  "Hold up 2 fingers."  "Now do the same thing with the other hand."    Score: 1+2 AND, either 3 or 4 present = Positive CAM-ICU    Appearance: age appropriate, fair hygiene/grooming, sitting up in bed   Level of Consciousness: alert, awake   Grooming:  appropriate to situation   Psychomotor Behavior: cooperative, eye contact normal   Speech: normal tone, normal rate, normal pitch, normal volume   Language: english, fluid   Orientation:   person, place, situation, day of week, month of year, year   Attention Span/Concentration: Intact to conversation   Memory: Recent and remote intact   Mood: "depressed"   Affect: Appropriate, mood-congruent   Thought Process: Linear, goal-directed   Associations: normal and logical   Thought Content: normal, no suicidality, no homicidality, delusions, or paranoia   Fund of Knowledge: adequate to education level   Insight: good   Judgment:  good     Capacity Evaluation     Capacity question:  Does the patient possess the ability to make " an informed decision, regarding the proposed treatment/option for his care (to participate in planning a safe discharge)?    Capacity for a given decision requires:  Understanding - the ability to state the meaning of the relevant information (eg, diagnosis, risks and benefits of a treatment or procedure, indications, and options of care).  The patient must be able to understand the proposed treatment, and/or options for his care.  Appreciation - the ability to explain how information (eg, diagnosis, benefit, and risk) applies to oneself.  The patient must be able to appreciate his own medical situation, and abstract, as to how the treatments/proposed options for care, apply to his medical situation.  Reasoning - the ability to compare information and infer consequences of choice.  The patient must be able to understand the consequences of his medical decision, in a reasonable manner, supported by the facts, and his own values, in a consistent fashion.  Expressing a choice - the ability to state a decision.  The patient must demonstrate the ability to communicate a choice, clearly and consistently.    Assessment of capacity:  The patient understands the clinical condition relation to this evaluation: Yes.  The patient understands the indication and nature of/reasoning behind the proposed treatment(s) and/or options for his care: Yes.  The patient understands and appreciates the risks and benefits of the proposed treatment(s) and/or options for his care: Yes.  The patient understands and appreciates the risks and benefits of refusing the proposed treatment(s) and/or options for his care: Yes.  The patient is in in agreement with the assessment and consents to working with primary team and family to figure out a safe dispo.  The patient has evidence of impaired insight and/or judgment: No.    Recommendations     Based upon my evaluation of Kamar Muñoz regarding his medical decision-making capacity for  participating in safe discharge planning, I found with reasonable certainty that Kamar Muñoz does have capacity to make medical decisions on his behalf.    Case discussed with: Dr. Jelly Zamora MD  [facility]iat[facility], PGY-2

## 2022-05-01 NOTE — SUBJECTIVE & OBJECTIVE
"Interval HPI:    Glucose trend continues to remain above goal with broad fluctuations in glucose  Glucose trend 200-230  Mismatch likely due to dietary indiscretion with variable po intake  Overnight events: none  Eatin-100%  Nausea: No  Hypoglycemia and intervention: No  Fever: No  TPN and/or TF: No  If yes, type of TF/TPN and rate: na    BP (!) 165/72   Pulse (!) 52   Temp 97.5 °F (36.4 °C) (Oral)   Resp 20   Ht 5' 11" (1.803 m)   Wt 81.6 kg (179 lb 14.3 oz)   SpO2 95%   BMI 25.09 kg/m²     Labs Reviewed and Include    No results for input(s): GLU, CALCIUM, ALBUMIN, PROT, NA, K, CO2, CL, BUN, CREATININE, ALKPHOS, ALT, AST, BILITOT in the last 24 hours.  Lab Results   Component Value Date    WBC 5.46 2022    HGB 11.6 (L) 2022    HCT 35.5 (L) 2022    MCV 92 2022     2022     No results for input(s): TSH, FREET4 in the last 168 hours.  Lab Results   Component Value Date    HGBA1C 9.7 (H) 2022       Nutritional status:   Body mass index is 25.09 kg/m².  Lab Results   Component Value Date    ALBUMIN 2.6 (L) 2022    ALBUMIN 2.4 (L) 2022    ALBUMIN 2.4 (L) 2022     No results found for: PREALBUMIN    Estimated Creatinine Clearance: 81.1 mL/min (based on SCr of 0.8 mg/dL).    Accu-Checks  Recent Labs     22  1513 22  2344 22  0501 22  0739 22  1125 22  1547 22  2036 22  2155 22  0833   POCTGLUCOSE 348* 227* 274* 300* 311* 300* 210* 336* 250* 200*       Current Medications and/or Treatments Impacting Glycemic Control  Immunotherapy:    Immunosuppressants       None          Steroids:   Hormones (From admission, onward)                Start     Stop Route Frequency Ordered    22 1047  melatonin tablet 6 mg  (Medication Panel)         -- Oral Nightly PRN 22 0948          Pressors:    Autonomic Drugs (From admission, onward)                None      "     Hyperglycemia/Diabetes Medications:   Antihyperglycemics (From admission, onward)                Start     Stop Route Frequency Ordered    05/02/22 0715  insulin aspart U-100 pen 18 Units         -- SubQ with breakfast 05/01/22 0913    05/01/22 1130  insulin aspart U-100 pen 18 Units         -- SubQ with lunch 05/01/22 0913    04/30/22 1645  insulin aspart U-100 pen 12 Units         -- SubQ with dinner 04/30/22 0941    04/30/22 1000  insulin detemir U-100 pen 6 Units         -- SubQ 2 times daily 04/30/22 0945    04/25/22 0931  insulin aspart U-100 pen 1-10 Units         -- SubQ Before meals & nightly PRN 04/25/22 0832    04/17/22 0111  insulin aspart U-100 pen 4 Units         -- SubQ With snacks 04/17/22 0012

## 2022-05-01 NOTE — PROGRESS NOTES
[facility] - Telemetry [facility] Medicine  Telemedicine Progress Note    Patient Name: Kamar Muñoz  MRN: [id number]  Patient Class: IP- Inpatient   Admission Date: 4/11/2022  Length of Stay: 19 days  Attending Physician: Tiffany Awad MD  Primary Care Provider: Basim Guerrero MD    Subjective:     Principal Problem:Pulmonary cavitary lesion    HPI:  Mr. Kamar Muñoz is a 76 y.o. male with a past medical history of HTN, Type 2 DM, CAD, STEMI, HLD, paroxysmal Afib, CVA, seizures and recurrent DKA.  He presented to [facility] ED on 4/11 with elevated blood glucose.  He was discharged from [facility] on 4/10 after being admitted for DKA on 4/5.  At time of exam patient is alert but altered and unable to provide any history.  Spoke with patient's daughter who states she is a primary caregiver at home and obtained history as well as review of chart.  Per family he was not feeling well after discharge to home and vomited several times on 4/11. Unknown characteristics of emesis. Finger stick at home read High with unreadable blood glucose.  At this time daughter gave him 14 units of insulin and sublingual zofran. Other than malaise and nausea patient was not exhibiting any other signs/symptoms at home.  In ER BG found to be 1015 with pCO2 6 and anion gap 35.  Hyperkalemic with K 7.0 and some EKG changes when compared to previous EKG.  Given calcium gluconate, and started on insulin gtt as well as subQ long acting insulin.      Critical Care Medicine consulted for DKA and admitted to MICU.        Overview/Hospital Course:  Admitted to MICU on 4/11 for DKA with BG >1000, pCO2 6, AG 35, and hyperkalemia.  Given Calcium gluconate and started on insulin gtt in ED.  Hyperkalemia not improved on repeat labs and bolused with IV insulin.  Infectious workup sent and broad spectrum abx started.  4/12 potassium improving with insulin. 4/13 Gap closed, insulin gtt turned off and switched to subq insulin. Endocrine  consulted for insulin mgmt. He was stepped down to  4/14.    Since step down stable. Advanced diet. Endocrine following and titrating insulin. Poor po intake making it difficult to adjust insulin. CT chest with cavitary lesion, pulmonary and ID consulted. SNF once medically stable for dc.      Telemedicine  This service was provided by Virtual Visit.    Patient was transferred to [facility] Medicine on:  04/22/2022     Chief Complaint   Patient presents with    Altered Mental Status     The patient location is: [id number] A   Admitted 4/11/2022  8:55 PM  Present with the patient at the time of the telemed/virtual assessment: Telepresenter    Interval History / Events Overnight:   The patient is able to provide adequate history. Additional history was obtained from past medical records. No significant events reported by Nursing.    Patient complains of nausea, better with scheduled Zofran. Symptoms have improved since yesterday. Associated symptoms include: fatigue. Symptoms are decreasing in severity.     Lab test(s) reviewed: high glycemic variability    Review of Systems   Constitutional:  Negative for fever.   Respiratory:  Negative for shortness of breath.    Musculoskeletal:  Positive for gait problem.   Neurological:  Positive for weakness.     Objective:     Vital Signs (Most Recent):  Temp: 98.5 °F (36.9 °C) (04/30/22 0730)  Pulse: 64 (04/30/22 0730)  Resp: 15 (04/30/22 0730)  BP: 133/60 (04/30/22 0730)  SpO2: 95 % (04/30/22 0730)   Vital Signs (24h Range):  Temp:  [98 °F (36.7 °C)-98.5 °F (36.9 °C)] 98.5 °F (36.9 °C)  Pulse:  [62-90] 64  Resp:  [15-18] 15  SpO2:  [95 %-97 %] 95 %  BP: (115-133)/(60-68) 133/60     Weight: 81.6 kg (179 lb 14.3 oz)  Body mass index is 25.09 kg/m².    Intake/Output Summary (Last 24 hours) at 4/30/2022 1031  Last data filed at 4/29/2022 1837  Gross per 24 hour   Intake 200 ml   Output --   Net 200 ml        Physical Exam  Constitutional:       General: He is not in acute  distress.     Appearance: Normal appearance.   Eyes:      General: Lids are normal. No scleral icterus.        Right eye: No discharge.         Left eye: No discharge.      Conjunctiva/sclera: Conjunctivae normal.   Neck:      Trachea: Phonation normal.   Cardiovascular:      Rate and Rhythm: Normal rate.      Comments: Monitor / Vital signs reviewed at time of visit  Pulmonary:      Effort: Pulmonary effort is normal. No tachypnea, accessory muscle usage or respiratory distress.   Abdominal:      General: There is no distension.   Skin:     Coloration: Skin is not cyanotic.   Neurological:      Mental Status: He is alert. He is not disoriented.   Psychiatric:         Attention and Perception: Attention normal.         Mood and Affect: Affect normal. Mood is depressed.         Behavior: Behavior is cooperative.       Significant Labs:   Recent Labs   Lab 12/29/21  0810 01/26/22  1512 04/05/22  1138   HGBA1C 12.3* 11.5* 9.7*       Recent Labs   Lab 04/29/22  2344 04/30/22  0501 04/30/22  0739   POCTGLUCOSE 274* 300* 311*       Recent Labs   Lab 04/25/22  0407 04/27/22  0459 04/29/22  0335   WBC 7.29 6.86 5.46   HGB 11.4* 11.2* 11.6*   HCT 34.4* 35.1* 35.5*    243 227       Recent Labs   Lab 04/25/22  0407 04/27/22  0459 04/29/22  0335   GRAN 58.2  4.2 53.1  3.6 49.6  2.7   LYMPH 28.4  2.1 31.5  2.2 31.5  1.7   MONO 7.0  0.5 8.6  0.6 11.4  0.6   EOS 0.4 0.4 0.4       Recent Labs   Lab 04/25/22  0407 04/27/22  0459 04/29/22  0335    135* 138   K 4.5 4.0 3.7    100 103   CO2 26 27 25   BUN 21 20 17   CREATININE 0.9 0.8 0.8   * 135* 40*   CALCIUM 8.3* 8.4* 8.5*   ALBUMIN 2.4* 2.4* 2.6*   MG 1.5* 1.5* 1.9   PHOS 2.9 4.1 2.6*       Recent Labs   Lab 04/25/22  0407 04/27/22  0459 04/29/22  0335   ALKPHOS 103 97 97   ALT 36 38 54*   AST 52* 47* 88*   PROT 5.5* 5.6* 5.8*   BILITOT 0.5 0.5 0.4       Recent Labs   Lab 02/19/22  2242 02/20/22  0046 03/03/22  2113 03/05/22  1037 03/05/22  1939  03/07/22 2043 04/11/22  2236 04/12/22  0207 04/12/22  0618 04/12/22  1051 04/12/22  1324   PROCAL 0.93*  --   --   --   --   --   --  1.18*  --   --   --    LACTATE  --    < >  --    < > 1.0  --    < > 8.2* 4.2* 3.2* 2.0   DDIMER 0.84*   < > 0.50*  --  0.50* 0.43  --   --   --   --   --    FERRITIN 564*   < > 300  --  354* 334*  --   --   --   --   --    SEDRATE 92*  --   --   --   --   --   --   --   --   --   --     < > = values in this interval not displayed.       Results for orders placed or performed in visit on 05/05/14   Vitamin D   Result Value Ref Range    Vit D, 25-Hydroxy 24 (L) 30 - 96 ng/mL     SARS-CoV2 (COVID-19) Qualitative PCR (no units)   Date Value   03/21/2022 Not Detected   03/17/2022 Not Detected   03/14/2022 Not Detected   03/08/2022 Not Detected     POC Rapid COVID (no units)   Date Value   04/05/2022 Negative   02/17/2022 Positive (A)   01/25/2022 Negative   01/12/2022 Negative   01/09/2022 Negative       ECG Results              EKG 12-lead (Final result)  Result time 04/12/22 12:51:09      Final result by Interface, Lab In [facility] (04/12/22 12:51:09)                   Narrative:    Test Reason : R73.9,    Vent. Rate : 118 BPM     Atrial Rate : 111 BPM     P-R Int : 000 ms          QRS Dur : 162 ms      QT Int : 436 ms       P-R-T Axes : 000 -14 052 degrees     QTc Int : 611 ms    Wide QRS rhythm  Right bundle branch block  ST elevation anterior leads differential includes anterior STEMI vs,  repolarization abnormality  Abnormal ECG  When compared with ECG of 05-APR-2022 11:41,  Wide QRS rhythm has replaced Sinus rhythm  Vent. rate has increased BY  48 BPM  Confirmed by Dayami SHAW, Megha ([id number]) on 4/12/2022 12:51:00 PM    Referred By: [email or web address]   SELF           Confirmed By:Megha Stock MD                                    Results for orders placed during the hospital encounter of 01/25/22    Echo    Interpretation Summary  · There is abnormal septal wall motion.  · The left  ventricle is normal in size with low normal systolic function.  · The estimated ejection fraction is 50%.  · Normal left ventricular diastolic function.  · Mild left atrial enlargement.  · Normal right ventricular size with normal right ventricular systolic function.  · Mild mitral regurgitation.  · There are segmental left ventricular wall motion abnormalities.  · Mild tricuspid regurgitation.  · Elevated central venous pressure (15 mmHg).      NM Gastric Emptying  Narrative: EXAMINATION:  NM GASTRIC EMPTYING    CLINICAL HISTORY:  chronic nausea in diabetic;    TECHNIQUE:  The patient received 1.0 mCi orally of sulfur colloid labeled meal in less than 10 minutes.    Anterior and posterior projection images were obtained immediately and at 60 minute intervals for 4 hours.    Geometric mean of the anterior and the posterior images was generated. The decay-corrected time activity curve and the percentage of the retention and emptying were obtained.    COMPARISON:  CT abdomen pelvis 04/13/2022.    FINDINGS:  At 4 hour(s) the percentage of retention is 2 % (normal retention at 4 hours is 10% and lower).  Impression: Normal gastric emptying time.    I, Bam Booth MD, attest that I reviewed and interpreted the images.    Electronically signed by resident: Cisco Alvarado MD  Date:    04/29/2022  Time:    14:09    Electronically signed by: Bam Booth  Date:    04/29/2022  Time:    14:18      Labs and Imaging within the last 24 hours listed above were reviewed.       Diet: Diet diabetic [facility]; 2000 Calorie  Significant LDAs:   IV Access Type: Peripheral  Urinary Catheter Indication if present: Patient Does Not Have Urinary Catheter  Other Lines/Tubes/Drains:         Goals of Care:    Previous admission:  4/5/22  Likely prognosis:  Fair  Code Status: DNR  Comfort Only: No  Hospice: No  Goals at discharge: remain at home, with physician follow-up    Discharge Planning   ALLIE: 4/30/2022     Code Status: DNR   Is the  "patient medically ready for discharge?: No    Reason for patient still in hospital (select all that apply): Patient trending condition, Imaging, and Consult recommendations  Discharge Plan A: Home, Home Health, Other (24/7 sitters and family support)   Discharge Delays: None known at this time      Assessment/Plan:      * Pulmonary cavitary lesion  - As per CT, noted about a month ago  - Repeat CT Chest without contrast obtained on 4/19 revealed "an evolving appearance of a right upper lobe posterior segment cavitary lesion with internal dependent mass; the cavity demonstrates a thinner wall and has decreased in diameter.  There is adjacent contracture of parenchyma.  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  The central debris is decreased in size,, now measuring 1.0 cm (axial series 4, image 81), previously 2.0 cm, and various other nodules".   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  - 1/11/2022 Culture with MAC pending susceptibilities   - 1/11/2022 2nd AFB culture with pending results  - Differential includes MAC, aspergillosis, chronic aspiration and less likely malignancy   - Pulmonary rec Augmentin x 7 days   - Induced sputum and send for cultures ordered, and AFB - has not been able to supply sample  - ID consulted and recommend repeat AFB sputum  - Poor candidate for surgical intervention given he is on triple therapy (eliquis + DAPT) for both afib and recent STEMI with LAUREN stent placed in January  - suspect his weight loss and recent decline worsened by underlying infection  - Repeat Chest CT in 3 months - 7/2022   - No indication for bronchoscopy at this time  - Can continue follow up with Dr. Louie outpatient.   - On RA and denies respiratory symptoms    Nausea  Numerous episodes of nausea (and vomiting at home) during this admit and with prior admits  -no history of peptic ulcer disease per the patient but definite reflux " symptoms  -therapy with pantoprazole alone ineffective; symptoms persist with addition of Carafate.  -suspect is multifactorial with GERD and poor gastric emptying suspected  -he is not a candidate for Reglan due to history of seizure  -currently requiring scheduled Zofran with meals to improve symptoms  -he is having regular bowel movements which are soft  -discussed with GI and proceeded with GES which was normal    Bacterial pneumonia  Completed 7 days of Augmentin    Goals of care, counseling/discussion  - DNR as per ICU discussion with patient and family    Functional urinary incontinence  Remains requiring adult diapers  Post-void residual ordered to assess for retention  Initiate timed toileting for bladder training    Severe malnutrition  Nutrition consulted. Most recent weight and BMI monitored- Body mass index is 25.09 kg/m².      -he is drinking more Boost than eating food so increased Boost supplements to 2 servings with each tray.     Measurements:  Wt Readings from Last 1 Encounters:   04/27/22 81.6 kg (179 lb 14.3 oz)   Body mass index is 25.09 kg/m².    Recommendations: Recommendation/Intervention: 1. Continue current Diabetic diet + ONS. 2. RD to monitor & follow-up.  Goals: Meet % EEN, EPN by RD f/u date    Patient has been screened and assessed by RD. RD will follow patient.    Discharge planning issues  PT/OT recommends skilled nursing facility for ongoing therapy; patient acknowledges and agrees with the need for ongoing therapy but unfortunately he is required to pay a co-pay for further SNF days.    He feels his only options to go home with limited sitter assistance and home health; his son has made arrangements for him to go to his preferred facility, [facility] but the patient feels he cannot manage the financial implications of going there; patient's son is committed to getting patient to [facility] which is likely his safest disposition as patient needs 24/7  care.  Patient continues to require 24/7 care for safety, ambulation, meal preparation, and medication management. Patient currently requesting to remain in hospital, refuses NH placement, and does not have adequate 24/7 care in place for safe discharge to home. Family continues to support NH placement at [facility].   Capacity for participation in discharge plan unclear; concern for depression and reluctance to leave his seriously ill wife hospitalized in this hospital.   Consulting Psychiatry for capacity assessment, evaluation of depression, capacity for choosing palliative care vs. aggressive medical management of his chronic illnesses. Patient has requested DNR status.    Ketosis-prone diabetes mellitus  - DKA now resolved  - Endocrine following and adjusting insulin regimen as needed  - SSI provided for corrective dosing  - Hypoglycemic protocol in effect  -Endocrine following due to erratic glucoses; notified of plan to discharge home in light of re-admits with DKA and eats regular diet at home  -patient needs assistance with 3 discrete meals per day and insulin management at home.    Focal seizures  - History of seizures treated with lacosamide.    - Continue home lacosamide    Coronary artery disease  - Stable  - Continue home regimen of aspirin, atorvastatin, clopidogrel, losartan, and metoprolol    Paroxysmal atrial fibrillation  - History of A-fib.    - Continue home regimen of amiodarone, apixaban, and metoprolol    Essential hypertension  - resume home meds as tolerated        Active Hospital Problems    Diagnosis  POA    *Pulmonary cavitary lesion [J98.4]  Yes    Nausea [R11.0]  Yes    Dyslipidemia associated with type 2 diabetes mellitus [E11.69, E78.5]  Yes    Bacterial pneumonia [J15.9]  Yes    Goals of care, counseling/discussion [Z71.89]  Not Applicable    Functional urinary incontinence [R39.81]  Yes    Severe malnutrition [E43]  Yes    Discharge planning issues [Z02.9]  Not  Applicable    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]  Yes    Focal seizures [R56.9]  Yes     Chronic    Coronary artery disease [I25.10]  Yes    Paroxysmal atrial fibrillation [I48.0]  Yes     Chronic    Essential hypertension [I10]  Yes     Chronic      Resolved Hospital Problems    Diagnosis Date Resolved POA    Encephalopathy [G93.40] 04/13/2022 Yes    Lactic acidosis [E87.2] 04/21/2022 Yes    Diabetic ketoacidosis without coma associated with type 2 diabetes mellitus [E11.10] 04/20/2022 Yes    Hyperkalemia [E87.5] 04/13/2022 Yes    BRENDAN (acute kidney injury) [N17.9] 04/21/2022 Yes       Inpatient Medications Prescribed for Management of Current Problems:     Scheduled Meds:    amiodarone  200 mg Oral Daily    apixaban  5 mg Oral BID    atorvastatin  40 mg Oral Daily    clopidogreL  75 mg Oral Daily    docusate sodium  50 mg Oral BID    insulin aspart U-100  12 Units Subcutaneous with dinner    insulin aspart U-100  16 Units Subcutaneous with breakfast    insulin aspart U-100  16 Units Subcutaneous with lunch    insulin detemir U-100  6 Units Subcutaneous BID    lacosamide  100 mg Oral Q12H    metoprolol succinate  50 mg Oral Daily    ondansetron  4 mg Oral TID WM    pantoprazole  40 mg Oral Daily    polyethylene glycol  17 g Oral Daily    senna-docusate 8.6-50 mg  1 tablet Oral BID    sucralfate  1 g Oral QID (AC & HS)    tamsulosin  0.4 mg Oral Daily     Continuous Infusions:   As Needed: acetaminophen, calcium carbonate, cloNIDine, dextrose 10%, dextrose 10%, diphenhydrAMINE, glucagon (human recombinant), glucose, glucose, hydrALAZINE, insulin aspart U-100, insulin aspart U-100, melatonin, ondansetron, prochlorperazine, senna, simethicone    VTE Risk Mitigation (From admission, onward)         Ordered     apixaban tablet 5 mg  2 times daily         04/20/22 1209              I have assessed these finding virtually using telemed platform and with assistance of bedside nurse     The  attending portion of this evaluation, treatment, and documentation was performed per Tiffany Awad MD via Telemedicine AudioVisual using the secure [employer]Meet software platform with 2 way audio/video. The provider was located off-site and the patient is located in the hospital. The aforementioned video software was utilized to document the relevant history and physical exam. Secure Yappe software platform was used instead of [employer]upMeet for this visit.      Tiffany Awad MD  Department of Hospital Medicine   [facility] - Telemetry Stepdown

## 2022-05-02 ENCOUNTER — TELEPHONE (OUTPATIENT)
Dept: FAMILY MEDICINE | Facility: CLINIC | Age: 77
End: 2022-05-02
Payer: MEDICARE

## 2022-05-02 ENCOUNTER — TELEPHONE (OUTPATIENT)
Dept: ENDOCRINOLOGY | Facility: CLINIC | Age: 77
End: 2022-05-02
Payer: MEDICARE

## 2022-05-02 DIAGNOSIS — Z79.4 TYPE 2 DIABETES MELLITUS WITH HYPERGLYCEMIA, WITH LONG-TERM CURRENT USE OF INSULIN: Primary | ICD-10-CM

## 2022-05-02 DIAGNOSIS — E11.65 TYPE 2 DIABETES MELLITUS WITH HYPERGLYCEMIA, WITH LONG-TERM CURRENT USE OF INSULIN: Primary | ICD-10-CM

## 2022-05-02 PROBLEM — Z91.89 AT HIGH RISK FOR SKIN BREAKDOWN: Status: ACTIVE | Noted: 2022-05-02

## 2022-05-02 LAB
ALBUMIN SERPL BCP-MCNC: 2.6 G/DL (ref 3.5–5.2)
ALP SERPL-CCNC: 97 U/L (ref 55–135)
ALT SERPL W/O P-5'-P-CCNC: 43 U/L (ref 10–44)
ANION GAP SERPL CALC-SCNC: 8 MMOL/L (ref 8–16)
AST SERPL-CCNC: 38 U/L (ref 10–40)
BASOPHILS # BLD AUTO: 0.06 K/UL (ref 0–0.2)
BASOPHILS NFR BLD: 1.1 % (ref 0–1.9)
BILIRUB SERPL-MCNC: 0.5 MG/DL (ref 0.1–1)
BUN SERPL-MCNC: 18 MG/DL (ref 8–23)
CALCIUM SERPL-MCNC: 8.5 MG/DL (ref 8.7–10.5)
CHLORIDE SERPL-SCNC: 102 MMOL/L (ref 95–110)
CO2 SERPL-SCNC: 28 MMOL/L (ref 23–29)
CREAT SERPL-MCNC: 0.8 MG/DL (ref 0.5–1.4)
DIFFERENTIAL METHOD: ABNORMAL
EOSINOPHIL # BLD AUTO: 0.4 K/UL (ref 0–0.5)
EOSINOPHIL NFR BLD: 8 % (ref 0–8)
ERYTHROCYTE [DISTWIDTH] IN BLOOD BY AUTOMATED COUNT: 15.8 % (ref 11.5–14.5)
EST. GFR  (AFRICAN AMERICAN): >60 ML/MIN/1.73 M^2
EST. GFR  (NON AFRICAN AMERICAN): >60 ML/MIN/1.73 M^2
GLUCOSE SERPL-MCNC: 215 MG/DL (ref 70–110)
HCT VFR BLD AUTO: 36.5 % (ref 40–54)
HGB BLD-MCNC: 11.7 G/DL (ref 14–18)
IMM GRANULOCYTES # BLD AUTO: 0.02 K/UL (ref 0–0.04)
IMM GRANULOCYTES NFR BLD AUTO: 0.4 % (ref 0–0.5)
LYMPHOCYTES # BLD AUTO: 1.6 K/UL (ref 1–4.8)
LYMPHOCYTES NFR BLD: 29.3 % (ref 18–48)
MAGNESIUM SERPL-MCNC: 1.5 MG/DL (ref 1.6–2.6)
MCH RBC QN AUTO: 29.5 PG (ref 27–31)
MCHC RBC AUTO-ENTMCNC: 32.1 G/DL (ref 32–36)
MCV RBC AUTO: 92 FL (ref 82–98)
MONOCYTES # BLD AUTO: 0.5 K/UL (ref 0.3–1)
MONOCYTES NFR BLD: 9.6 % (ref 4–15)
NEUTROPHILS # BLD AUTO: 2.8 K/UL (ref 1.8–7.7)
NEUTROPHILS NFR BLD: 51.6 % (ref 38–73)
NRBC BLD-RTO: 0 /100 WBC
PHOSPHATE SERPL-MCNC: 3 MG/DL (ref 2.7–4.5)
PLATELET # BLD AUTO: 203 K/UL (ref 150–450)
PMV BLD AUTO: 10 FL (ref 9.2–12.9)
POCT GLUCOSE: 116 MG/DL (ref 70–110)
POCT GLUCOSE: 192 MG/DL (ref 70–110)
POCT GLUCOSE: 273 MG/DL (ref 70–110)
POCT GLUCOSE: 283 MG/DL (ref 70–110)
POCT GLUCOSE: 44 MG/DL (ref 70–110)
POCT GLUCOSE: 44 MG/DL (ref 70–110)
POTASSIUM SERPL-SCNC: 4.4 MMOL/L (ref 3.5–5.1)
PROT SERPL-MCNC: 6.1 G/DL (ref 6–8.4)
RBC # BLD AUTO: 3.96 M/UL (ref 4.6–6.2)
SODIUM SERPL-SCNC: 138 MMOL/L (ref 136–145)
WBC # BLD AUTO: 5.39 K/UL (ref 3.9–12.7)

## 2022-05-02 PROCEDURE — 99232 SBSQ HOSP IP/OBS MODERATE 35: CPT | Mod: ,,, | Performed by: INTERNAL MEDICINE

## 2022-05-02 PROCEDURE — 25000003 PHARM REV CODE 250: Performed by: INTERNAL MEDICINE

## 2022-05-02 PROCEDURE — 36415 COLL VENOUS BLD VENIPUNCTURE: CPT | Performed by: INTERNAL MEDICINE

## 2022-05-02 PROCEDURE — 99223 1ST HOSP IP/OBS HIGH 75: CPT | Mod: ,,, | Performed by: NURSE PRACTITIONER

## 2022-05-02 PROCEDURE — 97535 SELF CARE MNGMENT TRAINING: CPT

## 2022-05-02 PROCEDURE — 99232 PR SUBSEQUENT HOSPITAL CARE,LEVL II: ICD-10-PCS | Mod: 95,,, | Performed by: INTERNAL MEDICINE

## 2022-05-02 PROCEDURE — 97116 GAIT TRAINING THERAPY: CPT

## 2022-05-02 PROCEDURE — 99232 PR SUBSEQUENT HOSPITAL CARE,LEVL II: ICD-10-PCS | Mod: ,,, | Performed by: INTERNAL MEDICINE

## 2022-05-02 PROCEDURE — 84100 ASSAY OF PHOSPHORUS: CPT | Performed by: INTERNAL MEDICINE

## 2022-05-02 PROCEDURE — 20600001 HC STEP DOWN PRIVATE ROOM

## 2022-05-02 PROCEDURE — 80053 COMPREHEN METABOLIC PANEL: CPT | Performed by: INTERNAL MEDICINE

## 2022-05-02 PROCEDURE — 99232 SBSQ HOSP IP/OBS MODERATE 35: CPT | Mod: 95,,, | Performed by: INTERNAL MEDICINE

## 2022-05-02 PROCEDURE — 83735 ASSAY OF MAGNESIUM: CPT | Performed by: INTERNAL MEDICINE

## 2022-05-02 PROCEDURE — 99223 PR INITIAL HOSPITAL CARE,LEVL III: ICD-10-PCS | Mod: ,,, | Performed by: NURSE PRACTITIONER

## 2022-05-02 PROCEDURE — 97530 THERAPEUTIC ACTIVITIES: CPT

## 2022-05-02 PROCEDURE — 85025 COMPLETE CBC W/AUTO DIFF WBC: CPT | Performed by: INTERNAL MEDICINE

## 2022-05-02 RX ORDER — INSULIN ASPART 100 [IU]/ML
9-18 INJECTION, SOLUTION INTRAVENOUS; SUBCUTANEOUS
Status: DISCONTINUED | OUTPATIENT
Start: 2022-05-03 | End: 2022-05-03 | Stop reason: HOSPADM

## 2022-05-02 RX ORDER — BLOOD-GLUCOSE TRANSMITTER
1 EACH MISCELLANEOUS DAILY
Qty: 1 EACH | Refills: 0 | Status: SHIPPED | OUTPATIENT
Start: 2022-05-02 | End: 2022-05-18

## 2022-05-02 RX ORDER — INSULIN ASPART 100 [IU]/ML
12 INJECTION, SOLUTION INTRAVENOUS; SUBCUTANEOUS
Status: DISCONTINUED | OUTPATIENT
Start: 2022-05-03 | End: 2022-05-02

## 2022-05-02 RX ORDER — LOSARTAN POTASSIUM 25 MG/1
25 TABLET ORAL DAILY
Status: DISCONTINUED | OUTPATIENT
Start: 2022-05-02 | End: 2022-05-03 | Stop reason: HOSPADM

## 2022-05-02 RX ORDER — BLOOD-GLUCOSE,RECEIVER,CONT
1 EACH MISCELLANEOUS DAILY
Qty: 1 EACH | Refills: 0 | Status: SHIPPED | OUTPATIENT
Start: 2022-05-02 | End: 2022-05-18

## 2022-05-02 RX ORDER — INSULIN ASPART 100 [IU]/ML
6-12 INJECTION, SOLUTION INTRAVENOUS; SUBCUTANEOUS
Status: DISCONTINUED | OUTPATIENT
Start: 2022-05-03 | End: 2022-05-03 | Stop reason: HOSPADM

## 2022-05-02 RX ORDER — INSULIN ASPART 100 [IU]/ML
14 INJECTION, SOLUTION INTRAVENOUS; SUBCUTANEOUS
Status: DISCONTINUED | OUTPATIENT
Start: 2022-05-02 | End: 2022-05-02

## 2022-05-02 RX ADMIN — ATORVASTATIN CALCIUM 40 MG: 20 TABLET, FILM COATED ORAL at 08:05

## 2022-05-02 RX ADMIN — CLOPIDOGREL 75 MG: 75 TABLET, FILM COATED ORAL at 08:05

## 2022-05-02 RX ADMIN — SUCRALFATE 1 G: 1 SUSPENSION ORAL at 01:05

## 2022-05-02 RX ADMIN — SERTRALINE HYDROCHLORIDE 25 MG: 25 TABLET ORAL at 08:05

## 2022-05-02 RX ADMIN — INSULIN ASPART 14 UNITS: 100 INJECTION, SOLUTION INTRAVENOUS; SUBCUTANEOUS at 06:05

## 2022-05-02 RX ADMIN — POLYETHYLENE GLYCOL 3350 17 G: 17 POWDER, FOR SOLUTION ORAL at 08:05

## 2022-05-02 RX ADMIN — LACOSAMIDE 100 MG: 100 TABLET, FILM COATED ORAL at 08:05

## 2022-05-02 RX ADMIN — INSULIN ASPART 6 UNITS: 100 INJECTION, SOLUTION INTRAVENOUS; SUBCUTANEOUS at 01:05

## 2022-05-02 RX ADMIN — PANTOPRAZOLE SODIUM 40 MG: 40 TABLET, DELAYED RELEASE ORAL at 08:05

## 2022-05-02 RX ADMIN — AMIODARONE HYDROCHLORIDE 200 MG: 200 TABLET ORAL at 08:05

## 2022-05-02 RX ADMIN — METOPROLOL SUCCINATE 50 MG: 50 TABLET, EXTENDED RELEASE ORAL at 08:05

## 2022-05-02 RX ADMIN — SUCRALFATE 1 G: 1 SUSPENSION ORAL at 06:05

## 2022-05-02 RX ADMIN — INSULIN ASPART 6 UNITS: 100 INJECTION, SOLUTION INTRAVENOUS; SUBCUTANEOUS at 08:05

## 2022-05-02 RX ADMIN — SENNOSIDES AND DOCUSATE SODIUM 1 TABLET: 50; 8.6 TABLET ORAL at 08:05

## 2022-05-02 RX ADMIN — LOSARTAN POTASSIUM 25 MG: 25 TABLET, FILM COATED ORAL at 08:05

## 2022-05-02 RX ADMIN — DOCUSATE SODIUM 50 MG: 50 CAPSULE, LIQUID FILLED ORAL at 08:05

## 2022-05-02 RX ADMIN — APIXABAN 5 MG: 5 TABLET, FILM COATED ORAL at 08:05

## 2022-05-02 RX ADMIN — INSULIN ASPART 18 UNITS: 100 INJECTION, SOLUTION INTRAVENOUS; SUBCUTANEOUS at 08:05

## 2022-05-02 RX ADMIN — ONDANSETRON 4 MG: 4 TABLET, ORALLY DISINTEGRATING ORAL at 08:05

## 2022-05-02 RX ADMIN — ONDANSETRON 4 MG: 4 TABLET, ORALLY DISINTEGRATING ORAL at 04:05

## 2022-05-02 RX ADMIN — TAMSULOSIN HYDROCHLORIDE 0.4 MG: 0.4 CAPSULE ORAL at 08:05

## 2022-05-02 RX ADMIN — SUCRALFATE 1 G: 1 SUSPENSION ORAL at 04:05

## 2022-05-02 RX ADMIN — Medication 24 G: at 07:05

## 2022-05-02 RX ADMIN — SUCRALFATE 1 G: 1 SUSPENSION ORAL at 08:05

## 2022-05-02 RX ADMIN — ONDANSETRON 4 MG: 4 TABLET, ORALLY DISINTEGRATING ORAL at 01:05

## 2022-05-02 RX ADMIN — INSULIN ASPART 18 UNITS: 100 INJECTION, SOLUTION INTRAVENOUS; SUBCUTANEOUS at 01:05

## 2022-05-02 NOTE — ASSESSMENT & PLAN NOTE
PT/OT recommends skilled nursing facility for ongoing therapy; patient acknowledges and agrees with the need for ongoing therapy but unfortunately he is required to pay a co-pay for further SNF days.    He feels his only options to go home with limited sitter assistance and home health; his son has made arrangements for him to go to his preferred facility, [facility] but the patient feels he cannot manage the financial implications of going there; patient's son is committed to getting patient to [facility] which is likely his safest disposition as patient needs 24/7 care.  Patient continues to require 24/7 care for safety, ambulation, meal preparation, and medication management. Patient requesting to remain in hospital, refuses NH placement, and does not have adequate 24/7 care in place for safe discharge to home. Family continues to support NH placement at [facility].   Capacity for participation in discharge plan unclear; concern for depression and reluctance to leave his seriously ill wife hospitalized in this hospital.   Consulted Psychiatry for capacity assessment, evaluation of depression.  Patient found to have capacity. Zoloft started for depression. Continues to refuse NH; requires 24/7 assistance for safety and medication / meal management.

## 2022-05-02 NOTE — SUBJECTIVE & OBJECTIVE
Scheduled Meds:   amiodarone  200 mg Oral Daily    apixaban  5 mg Oral BID    atorvastatin  40 mg Oral Daily    clopidogreL  75 mg Oral Daily    docusate sodium  50 mg Oral BID    insulin aspart U-100  14 Units Subcutaneous with dinner    insulin aspart U-100  18 Units Subcutaneous with breakfast    insulin aspart U-100  18 Units Subcutaneous with lunch    insulin detemir U-100  6 Units Subcutaneous BID    lacosamide  100 mg Oral Q12H    losartan  25 mg Oral Daily    metoprolol succinate  50 mg Oral Daily    ondansetron  4 mg Oral TID WM    pantoprazole  40 mg Oral Daily    polyethylene glycol  17 g Oral Daily    senna-docusate 8.6-50 mg  1 tablet Oral BID    sertraline  25 mg Oral Daily    sucralfate  1 g Oral QID (AC & HS)    tamsulosin  0.4 mg Oral Daily     Continuous Infusions:  PRN Meds:acetaminophen, calcium carbonate, cloNIDine, dextrose 10%, dextrose 10%, diphenhydrAMINE, glucagon (human recombinant), glucose, glucose, hydrALAZINE, insulin aspart U-100, insulin aspart U-100, melatonin, ondansetron, prochlorperazine, senna, simethicone    Review of patient's allergies indicates:   Allergen Reactions    Iodine      Other reaction(s): swelling  Other reaction(s): Itching  Other reaction(s): Rash        Past Medical History:   Diagnosis Date    Arthritis     Coronary artery disease     COVID-19 virus infection 2/18/2022    Diabetes mellitus type II     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    Hyperlipidemia     Hypertension     Kidney stone     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 8/2/2012    STEMI involving right coronary artery 1/9/2022    Type II or unspecified type diabetes mellitus with neurological manifestations, uncontrolled(250.62) 3/8/2013    Urinary tract infection      Past Surgical History:   Procedure Laterality Date    BACK SURGERY      CATARACT EXTRACTION W/  INTRAOCULAR LENS IMPLANT Right     Per Dr Romero note 11/2018    COLONOSCOPY N/A 1/28/2019    Procedure: COLONOSCOPY  Lisa;  Surgeon: Anh Johnson MD;  Location: [facility] ENDO;  Service: Endoscopy;  Laterality: N/A;    EYE SURGERY      HERNIA REPAIR      LEFT HEART CATHETERIZATION Left 2022    Procedure: CATHETERIZATION, HEART, LEFT;  Surgeon: Will Hurst III, MD;  Location: [facility] CATH LAB/EP;  Service: Cardiology;  Laterality: Left;    renal stones      SHOULDER OPEN ROTATOR CUFF REPAIR         Family History       Problem Relation (Age of Onset)    Diabetes Father          Tobacco Use    Smoking status: Former Smoker     Packs/day: 1.50     Years: 25.00     Pack years: 37.50     Quit date: 1983     Years since quittin.3    Smokeless tobacco: Never Used   Substance and Sexual Activity    Alcohol use: No    Drug use: No    Sexual activity: Yes     Partners: Female     Review of Systems   Skin:  Positive for wound.     Objective:     Vital Signs (Most Recent):  Temp: 97.9 °F (36.6 °C) (22 1152)  Pulse: 67 (22 1152)  Resp: 18 (22 1152)  BP: (!) 114/57 (22 1152)  SpO2: (!) 93 % (22 1152)   Vital Signs (24h Range):  Temp:  [97.5 °F (36.4 °C)-98 °F (36.7 °C)] 97.9 °F (36.6 °C)  Pulse:  [55-68] 67  Resp:  [17-20] 18  SpO2:  [93 %-97 %] 93 %  BP: (101-156)/(56-71) 114/57     Weight: 81.6 kg (179 lb 14.3 oz)  Body mass index is 25.09 kg/m².  Physical Exam  Constitutional:       Appearance: Normal appearance.   Skin:     General: Skin is warm and dry.      Findings: No lesion.   Neurological:      Mental Status: He is alert.       Laboratory:  All pertinent labs reviewed within the last 24 hours.    Diagnostic Results:  None

## 2022-05-02 NOTE — PT/OT/SLP PROGRESS
Physical Therapy Treatment    Patient Name:  Kamar Muñoz   MRN:  [id number]    Recommendations:     Discharge Recommendations:  nursing facility, skilled   Discharge Equipment Recommendations: other (see comments) (tbd)   Barriers to discharge: Decreased caregiver support    Assessment:     Kamar Muñoz is a 76 y.o. male admitted with a medical diagnosis of Pulmonary cavitary lesion.  He presents with the following impairments/functional limitations:  weakness, impaired endurance, decreased coordination, decreased safety awareness, gait instability, impaired balance. Kamar Muñoz would benefit from acute PT intervention to address listed functional deficits, provide patient/caregiver education, reduce fall risk, and maximize (I) and safety with functional mobility.    Pt tolerated session well on this date; ambulated >200 ft with CGA and with one seated rest break. Pt also noted to be more open to the idea of receiving post-acute PT services prior to returning home. PT reinforced this plan as able. Pt will continue to benefit from acute PT services in order to maximize safety and (I) with functional mobility.    After hospital discharge, pt would benefit from SNF to continue addressing therapy impairments.    Rehab Prognosis: Good; patient would benefit from acute skilled PT services to address these deficits and reach maximum level of function.      Plan:     During this hospitalization, patient to be seen 4 x/week to address the identified rehab impairments via gait training, therapeutic activities, therapeutic exercises, neuromuscular re-education and progress toward the following goals:    · Plan of Care Expires:  05/04/22    This plan of care has been discussed with the patient, who was included in its development and is in agreement with the identified goals and treatment plan.     Subjective     Communicated with RN prior to session.  Patient agreeable to participate.     Chief Complaint:  none  Patient/Family Comments/goals: to get better and go home      Objective:     Patient found supine with Other (comments) (no active lines)  upon PT entry to room.    General Precautions: Standard, diabetic, fall   Orthopedic Precautions:N/A   Braces: N/A     Functional Mobility:    Bed Mobility:  · Supine to Sit: Stand-by Assistance  · Sit to Supine: Stand-by Assistance  · Scooting anteriorly to EOB to plant feet on floor: Stand-by Assistance    Transfers:   · Sit to Stand Transfer: Stand-by Assistance  from EOB with RW AD               Gait:  · Patient received gait training >200 feet with Contact Guard Assistance and rolling walker  · Gait Assessment: decreased speed, step length, swing through, heel strike, forward flexed posture, narrow NICOLE and downward gaze.   · PT providing verbal and tactile cues for improved upright posture, gaze direction, AD management, and gait fluidity.   · Verbal cues to increase width of NICOLE  · Pt able to ambulate with horizontal head turns without LOB (only performed 3, nonconsecutive)     Balance:  · Static Sit: Supervision at EOB x ~2 minutes  · Static Stand: Contact-Guard Assist with Rolling walker      Therapeutic Activities/Exercises     Pt educated on importance of upright positioning to promote cardiopulmonary health    Patient educated on the importance of early mobility to prevent functional decline during hospital stay    Patient was instructed to utilize staff assistance for mobility/transfers.  Patient was educated on PT POC and all questions answered within PT scope of practice.    Patient able to verbalize understanding; will follow-up with pt during current admit for additional questions/concerns within scope of practice.     White board updated.     AM-PAC 6 CLICK MOBILITY  Total Score:18     Patient left sitting edge of bed with call button in reach and RN and case mgmt present.        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    Hyperlipidemia     Hypertension     Kidney stone     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 8/2/2012    STEMI involving right coronary artery 1/9/2022    Type II or unspecified type diabetes mellitus with neurological manifestations, uncontrolled(250.62) 3/8/2013    Urinary tract infection        Past Surgical History:   Procedure Laterality Date    BACK SURGERY      CATARACT EXTRACTION W/  INTRAOCULAR LENS IMPLANT Right     Per Dr Romero note 11/2018    COLONOSCOPY N/A 1/28/2019    Procedure: COLONOSCOPY Suprep;  Surgeon: Anh Johnson MD;  Location: [facility] ENDO;  Service: Endoscopy;  Laterality: N/A;    EYE SURGERY      HERNIA REPAIR      LEFT HEART CATHETERIZATION Left 1/9/2022    Procedure: CATHETERIZATION, HEART, LEFT;  Surgeon: Will Hurst III, MD;  Location: [facility] CATH LAB/EP;  Service: Cardiology;  Laterality: Left;    renal stones      SHOULDER OPEN ROTATOR CUFF REPAIR         GOALS:   Multidisciplinary Problems     Physical Therapy Goals        Problem: Physical Therapy    Goal Priority Disciplines Outcome Goal Variances Interventions   Physical Therapy Goal     PT, PT/OT Ongoing, Progressing     Description: Goals to met by 4/29/2022     1. Supine to sit with Modified [place]  2. Sit to supine with Modified [place]  3. Sit to stand transfer with Modified [place]  4. Bed to chair transfer with Modified [place] using Rolling Walker  5. Gait  x 150 feet with Stand-by Assistance using Rolling Walker   6. Ascend/Descend 6 inch curb step with Contact Guard Assistance using LRAD.  7. Lower extremity exercise program x15 reps per Instruction, with supervision in order to facilitate improvement in functional independence                     Time Tracking:     PT Received On: 05/02/22  PT  Start Time: 1456     PT Stop Time: 1520  PT Total Time (min): 24 min     Billable Minutes: Gait Training 24      Hiram Stevenson, PT  05/02/2022

## 2022-05-02 NOTE — PT/OT/SLP PROGRESS
Occupational Therapy   Treatment    Name: Kamar Muñoz  MRN: [id number]  Admitting Diagnosis:  Pulmonary cavitary lesion       Recommendations:     Discharge Recommendations: nursing facility, skilled  Discharge Equipment Recommendations:  other (see comments) (TBD)  Barriers to discharge:  Decreased caregiver support    Assessment:     Kamar Muñoz is a 76 y.o. male with a medical diagnosis of Pulmonary cavitary lesion.  He presents having returned from long walk in hallway with PT. Pt expressed desire to go home as opposed to SNF although he disclosed he fell at home last week after being home less than 24 hours. Performance deficits affecting function are weakness, impaired endurance, decreased coordination, decreased safety awareness. Patient would benefit from continued skilled acute OT 4x/wk to improve functional mobility, increase independence with ADLs, and address established goals.Recommending SNF once medically appropriate for discharge to increase maximal independence, reduce burden of care, and ensure safety.     Rehab Prognosis:  Good; patient would benefit from acute skilled OT services to address these deficits and reach maximum level of function.       Plan:     Patient to be seen 4 x/week to address the above listed problems via self-care/home management, therapeutic activities, therapeutic exercises  · Plan of Care Expires: 05/15/22  · Plan of Care Reviewed with: patient    Subjective     Pain/Comfort:  · Pain Rating 1: 0/10    Objective:     Communicated with: nurse prior to session.  Patient found ambulatory in room/ding with Other (comments) (no lines) upon OT entry to room.    General Precautions: Standard, diabetic, fall   Orthopedic Precautions:N/A   Braces: N/A  Respiratory Status: Room air     Occupational Performance:     Bed Mobility:    · Patient completed Rolling/Turning to Left with  modified independence  · Patient completed Rolling/Turning to Right with modified  independence  · Patient completed Scooting/Bridging with modified independence  · Patient completed Supine to Sit with modified independence  · Patient completed Sit to Supine with modified independence     Functional Mobility/Transfers:  · Patient completed Sit <> Stand Transfer with stand by assistance  with  rolling walker   · Functional Mobility:Pt able to functionally ambulate greater than household distanc efor functional ADL's.    Activities of Daily Living:  · Feeding:  independence HOB elevated or EOB with set up on tray.  · Grooming: stand by assistance standing at sink with RW  · Upper Body Dressing: modified independence for street clothes sitting EOB  · Lower Body Dressing: supervision to don/doff socks EOB  · Toileting: supervision for clothing management and hygiene.      [facility] 6 Click ADL: 21    Treatment & Education:  Role of OT and POC  Safety  ADL retraining  Functional mobility training  Discharge planning  Importance of EOB/OOB activity  Patient performed B UE ROM exercises in all planes and joints focusing to improve strength and endurance to increase independence with ADLs with Red Theraband.      Patient left HOB elevated with all lines intact, call button in reach and nurse notifiedEducation:      GOALS:   Multidisciplinary Problems     Occupational Therapy Goals        Problem: Occupational Therapy    Goal Priority Disciplines Outcome Interventions   Occupational Therapy Goal     OT, PT/OT Ongoing, Progressing    Description: Goals to be met by: 4/29/2022     Patient will increase functional independence with ADLs by performing:    UE Dressing with Set-up Assistance.  LE Dressing with Minimal Assistance using AD PRN.  Grooming while standing at sink with Stand-by Assistance.  Toileting from toilet with Supervision for hygiene and clothing management.   Step transfer with Supervision using AD PRN.  Toilet transfer to toilet with Stand-by Assistance using AD PRN.                     Time  Tracking:     OT Date of Treatment: 05/02/22  OT Start Time: 1510  OT Stop Time: 1538  OT Total Time (min): 28 min    Billable Minutes:Self Care/Home Management 20  Therapeutic Activity 8    OT/JUAN: OT          5/2/2022

## 2022-05-02 NOTE — PLAN OF CARE
Problem: Pain Acute  Goal: Acceptable Pain Control and Functional Ability  Outcome: Ongoing, Progressing     Problem: Diabetes Comorbidity  Goal: Blood Glucose Level Within Targeted Range  Outcome: Ongoing, Progressing  Intervention: Monitor and Manage Glycemia  Flowsheets (Taken 5/2/2022 7795)  Glycemic Management:   blood glucose monitored   supplemental insulin given  Plan of care was reviewed with the pt. Jerry. VSS. Glucose monitoring was done. Supplemental insulin was given. Pt was turned  Q2 and was able to ambulate with assistance to the bathroom. Intake and output was documented. No major changes throughout the day. Safety precautions were in placed. Possible discharge to SNF tomorrow.

## 2022-05-02 NOTE — PROGRESS NOTES
[facility] - Telemetry Stepdown  Endocrinology  Progress Note    Admit Date: 4/11/2022     76 year old  male with T2DM, HTN, CAD, STEMI, HLD, paroxysmal Afib, CVA, seizures who presents for recurrent DKA.  [place] [facility] [place] with elevated blood glucose.  [place] [facility] [place] after being admitted for DKA on 4/5. Per family he was not feeling well after discharge to home and vomited several times on 4/11. Finger stick at home read High with unreadable blood glucose.  At this time daughter gave him 14 units of insulin and sublingual zofran. Other than malaise and nausea patient was not exhibiting any other signs/symptoms at home.    - In ER BG found to be 1015 with pCO2 6 and anion gap 35.  Hyperkalemic with K 7.0 and some EKG changes when compared to previous EKG.  Given calcium gluconate, and started on insulin gtt as well as subQ long acting insulin     - Endocrinology consulted for management of type 2 diabetes after resolution of DKA    - Spoke with daughter who states patient was discharged on 04/10/2022 with high glucose in the 400s which worsened after getting home and eating dinner; appropriate mealtime and correction insulin were given at that time. However, the next day patient's condition was worsening and they called EMS and his sugar was found to be in the thousands. She expresses concern that he cannot be care for adequately at home any longer and wishes to pursue skilled nursing facility.    Regarding Diabetes Mellitus:    Recurring episodes of DKA  Surgical Procedure and Date: NA  Diabetes diagnosis year: >20 years  Home Diabetes Medications:  During recent SNF was on Aspart 8 TID and glargine 8 units daily with sliding scale  How often checking glucose at home? >4 x day   Diabetes Complications include: Hyperglycemia, Hypoglycemia , and Diabetic peripheral neuropathy   Complicating diabetes co morbidities: History of CVA      Interval HPI:   Overnight events:  BG above  "goal    Eatin%  Nausea: No  Hypoglycemia and intervention: No  Fever: No  TPN and/or TF: No  If yes, type of TF/TPN and rate: NA    BP (!) 156/69 (BP Location: Left arm, Patient Position: Lying)   Pulse 63   Temp 97.5 °F (36.4 °C) (Oral)   Resp 18   Ht 5' 11" (1.803 m)   Wt 81.6 kg (179 lb 14.3 oz)   SpO2 96%   BMI 25.09 kg/m²     Labs Reviewed and Include    Recent Labs   Lab 22  0553   *   CALCIUM 8.5*   ALBUMIN 2.6*   PROT 6.1      K 4.4   CO2 28      BUN 18   CREATININE 0.8   ALKPHOS 97   ALT 43   AST 38   BILITOT 0.5     Lab Results   Component Value Date    WBC 5.39 2022    HGB 11.7 (L) 2022    HCT 36.5 (L) 2022    MCV 92 2022     2022     No results for input(s): TSH, FREET4 in the last 168 hours.  Lab Results   Component Value Date    HGBA1C 9.7 (H) 2022       Nutritional status:   Body mass index is 25.09 kg/m².  Lab Results   Component Value Date    ALBUMIN 2.6 (L) 2022    ALBUMIN 2.6 (L) 2022    ALBUMIN 2.4 (L) 2022     No results found for: PREALBUMIN    Estimated Creatinine Clearance: 81.1 mL/min (based on SCr of 0.8 mg/dL).    Accu-Checks  Recent Labs     22  1125 22  1547 22  2155 22  0833 22  1143 22  1540 22  0504 22  0804   POCTGLUCOSE 300* 210* 336* 250* 200* 193* 249* 279* 192* 273*       Current Medications and/or Treatments Impacting Glycemic Control  Immunotherapy:    Immunosuppressants       None          Steroids:   Hormones (From admission, onward)                Start     Stop Route Frequency Ordered    22 1047  melatonin tablet 6 mg  (Medication Panel)         -- Oral Nightly PRN 22 0948          Pressors:    Autonomic Drugs (From admission, onward)                None          Hyperglycemia/Diabetes Medications:   Antihyperglycemics (From admission, onward)                Start     Stop Route " Frequency Ordered    22 1645  insulin aspart U-100 pen 14 Units         -- SubQ with dinner 22 1004    22 0715  insulin aspart U-100 pen 18 Units         -- SubQ with breakfast 22 0913    22 1130  insulin aspart U-100 pen 18 Units         -- SubQ with lunch 22 0913    22 1000  insulin detemir U-100 pen 6 Units         -- SubQ 2 times daily 22 0945    22 0931  insulin aspart U-100 pen 1-10 Units         -- SubQ Before meals & nightly PRN 22 0832    22 0111  insulin aspart U-100 pen 4 Units         -- SubQ With snacks 22 0012            ASSESSMENT and PLAN    Nausea  Numerous episodes of nausea (and vomiting at home) during this admit and with prior admits  -  Attempted medical therapy with PPI, carafate, seizure hx no reglan, scheduled zofran  -  Primary suspects is multifactorial with GERD and poor gastric emptying suspected  -  GI with GES normal    Dyslipidemia associated with type 2 diabetes mellitus  - on atorvastatin 40 mg daily      Ketosis-prone diabetes mellitus  Key History and Diagnostic Findings  - Admit for recurrent DKA  - A1c of 9.7 on 2022 from 11.5 on 2022    - Home Regimen: Levemir 8 units daily, aspart 8 units TIDWM   - Weight based dosin kg x 0.5 = 41 TDD x 0.5 = 20 basal / 20 prandial   - 1700/TDD = 41 (estimated insulin sensitivity factor)   - 450/TDD = 11 (estimated starting carb ratio for prandial dosing)    -  Glucose Goals: 140-180mg/dL  -  24 hour glucose trend: 200-330 no lows  -  Variable diet %, inconsistent carbs with meals, diet indiscretions, intermittent nausea, variable diet/po intake  -  Boosts with meals  -  S/p GES normal    Plan  -  Levemir 6 units BID (fasting hypoglycemia with 8 units bid)  -  Aspart 18 units BF, 18 units L, 14 units D  -  Would likely better benefit from Carb Ratio instead with carb counting due to dietary indiscretion and variable intake and boosts with meals  -   Prandial insulin with range resulted in large fluctuations of global hyperglycemia and hypoglycemia, overcorrections  -  Moderate correction scale  -PLEASE DO NOT FEED PT IF BG >350, GIVE CORRECTION AND RESUME MEALS ONCE BG CLOSE     For DC : can consider sending on above regimen   -Above regimen is for a consistent carbohydrate intake of 60 grams per meal max. If pt eats more than 60 grams he will become hyperglycemic.   -Please provide a diabetes educator referral outpatient                 Coronary artery disease  - Strive to optimize glucose control        Rajeev Sanderson MD  Endocrinology  [facility] - Telemetry Stepdown

## 2022-05-02 NOTE — PLAN OF CARE
JASIEL met with pt to discuss discharge plan and fall risk.  Per psychiatry note on 5/1, pt has capacity to make medical  Decisions regarding his discharge plan. JASIEL discussed with pt if he choose to go home, per medical team- the family would need to secure 24/7 sitters for a safe discharge plan.  At this point, pt advised JASIEL he would like rehab.  Pt is interested in [facility] or [facility].     JASIEL spoke to pt's daughter, Basia [phone], to discuss discharge plan.  JASIEL discussed with pt's daughter and reiterated if pt goes home, he could be at a higher risk for falls that may result in injuries. JASIEL also verified pt's care taker is of adult age (50-61 y/o) and sits with pt approximately 5 hours a day.  JASIEL discussed with pt's daughter the importance of having 24/7 care if pt goes home with home health.  Pt's daughter request JASIEL help with finding an assisted living facility that provides PT/OT.  Pt's daughter advised JASIEL she is looking at [facility] for pt.  JASIEL provided pt's daughter with contact information for [facility] [phone] advised JASIEL they offer PT/OT three times a week through [facility].  Information provided to pt's daughter.       JASIEL spoke to Brunilda boyd/ [facility] [phone] to discuss possible admit.  Brunilda advised SW they are not in network with [employer] and unable to accept pt for SNF but can admit for USP placement at this time while medicaid application is pending.  JASIEL advised pt is interested in getting Rehab.  JASIEL advised Brunilda will speak with pt and family for decision.      JASIEL spoke to pt's son, Kamar [phone], provided update and discussed discharge plan.  Pt's son does not feel pt is ready for an assisted living program.  Pt's son stated pt is interested in Rehab at this time and they would like to see about getting patient back to [facility]; Family will make co-payments for [facility].  Pt's son advised JASIEL while pt is in SNF he will complete medicaid  application for LTC with [facility].   Pt's son will also reach out to Zana DO to follow up on admission to facility.      SW spoke to Cindy DO and was advised they have not spoken to patient about accepting him back tomorrow for SNF. She further advised there is a waiting list.      SW in communication with medical team, pt, pt's son (Kamar) and daughter (Basia). The above information has been provided to all parties involved in patient's care.      Oliva Lorenzo LMSW  PRN-  [facility]  Ext. [phone]

## 2022-05-02 NOTE — RESPIRATORY THERAPY
RAPID RESPONSE RESPIRATORY CHART CHECK       Chart check completed,    instructed to call [phone] for further concerns or assistance.

## 2022-05-02 NOTE — SUBJECTIVE & OBJECTIVE
Telemedicine  This service was provided by Virtual Visit.    Patient was transferred to [facility] on:  04/22/2022     Chief Complaint   Patient presents with    Altered Mental Status     The patient location is: 8092/8092 A   Admitted 4/11/2022  8:55 PM  Present with the patient at the time of the telemed/virtual assessment: Telepresenter    Interval History / Events Overnight:   The patient is able to provide adequate history. Additional history was obtained from past medical records. No significant events reported by Nursing.    Patient complains of feeling weak. Symptoms have been unchanged since yesterday. Associated symptoms include: fatigue. Symptoms are stable.     Lab test(s) reviewed: hyperglycemia    Review of Systems   Constitutional:  Negative for fever.   Respiratory:  Negative for shortness of breath.    Musculoskeletal:  Positive for gait problem.   Neurological:  Positive for weakness.     Objective:     Vital Signs (Most Recent):  Temp: 97.5 °F (36.4 °C) (05/02/22 0802)  Pulse: 63 (05/02/22 0802)  Resp: 18 (05/02/22 0802)  BP: (!) 156/69 (05/02/22 0802)  SpO2: 96 % (05/02/22 0847)   Vital Signs (24h Range):  Temp:  [97.5 °F (36.4 °C)-98 °F (36.7 °C)] 97.5 °F (36.4 °C)  Pulse:  [55-68] 63  Resp:  [17-20] 18  SpO2:  [95 %-97 %] 96 %  BP: (101-156)/(55-71) 156/69     Weight: 81.6 kg (179 lb 14.3 oz)  Body mass index is 25.09 kg/m².    Intake/Output Summary (Last 24 hours) at 5/2/2022 1057  Last data filed at 5/2/2022 0843  Gross per 24 hour   Intake 240 ml   Output --   Net 240 ml        Physical Exam  Constitutional:       General: He is not in acute distress.     Appearance: Normal appearance.   Eyes:      General: Lids are normal. No scleral icterus.        Right eye: No discharge.         Left eye: No discharge.      Conjunctiva/sclera: Conjunctivae normal.   Neck:      Trachea: Phonation normal.   Cardiovascular:      Rate and Rhythm: Normal rate.      Comments: Monitor / Vital  signs reviewed at time of visit  Pulmonary:      Effort: Pulmonary effort is normal. No tachypnea, accessory muscle usage or respiratory distress.   Abdominal:      General: There is no distension.   Skin:     Coloration: Skin is not cyanotic.   Neurological:      Mental Status: He is alert. He is not disoriented.   Psychiatric:         Attention and Perception: Attention normal.         Mood and Affect: Affect normal. Mood is depressed.         Behavior: Behavior is cooperative.       Significant Labs:   Recent Labs   Lab 12/29/21  0810 01/26/22  1512 04/05/22  1138   HGBA1C 12.3* 11.5* 9.7*       Recent Labs   Lab 05/01/22  2036 05/02/22  0504 05/02/22  0804   POCTGLUCOSE 279* 192* 273*       Recent Labs   Lab 04/27/22 0459 04/29/22 0335 05/02/22  0553   WBC 6.86 5.46 5.39   HGB 11.2* 11.6* 11.7*   HCT 35.1* 35.5* 36.5*    227 203       Recent Labs   Lab 04/27/22 0459 04/29/22 0335 05/02/22  0553   GRAN 53.1  3.6 49.6  2.7 51.6  2.8   LYMPH 31.5  2.2 31.5  1.7 29.3  1.6   MONO 8.6  0.6 11.4  0.6 9.6  0.5   EOS 0.4 0.4 0.4       Recent Labs   Lab 04/27/22 0459 04/29/22 0335 05/02/22  0553   * 138 138   K 4.0 3.7 4.4    103 102   CO2 27 25 28   BUN 20 17 18   CREATININE 0.8 0.8 0.8   * 40* 215*   CALCIUM 8.4* 8.5* 8.5*   ALBUMIN 2.4* 2.6* 2.6*   MG 1.5* 1.9 1.5*   PHOS 4.1 2.6* 3.0       Recent Labs   Lab 04/27/22 0459 04/29/22 0335 05/02/22  0553   ALKPHOS 97 97 97   ALT 38 54* 43   AST 47* 88* 38   PROT 5.6* 5.8* 6.1   BILITOT 0.5 0.4 0.5       Recent Labs   Lab 02/19/22  2242 02/20/22  0046 03/03/22  2113 03/05/22  1037 03/05/22  1939 03/07/22  2043 04/11/22  2236 04/12/22  0207 04/12/22  0618 04/12/22  1051 04/12/22  1324   PROCAL 0.93*  --   --   --   --   --   --  1.18*  --   --   --    LACTATE  --    < >  --    < > 1.0  --    < > 8.2* 4.2* 3.2* 2.0   DDIMER 0.84*   < > 0.50*  --  0.50* 0.43  --   --   --   --   --    FERRITIN 564*   < > 300  --  354* 334*  --   --    --   --   --    SEDRATE 92*  --   --   --   --   --   --   --   --   --   --     < > = values in this interval not displayed.       Results for orders placed or performed in visit on 05/05/14   Vitamin D   Result Value Ref Range    Vit D, 25-Hydroxy 24 (L) 30 - 96 ng/mL     SARS-CoV2 (COVID-19) Qualitative PCR (no units)   Date Value   03/21/2022 Not Detected   03/17/2022 Not Detected   03/14/2022 Not Detected   03/08/2022 Not Detected     POC Rapid COVID (no units)   Date Value   04/05/2022 Negative   02/17/2022 Positive (A)   01/25/2022 Negative   01/12/2022 Negative   01/09/2022 Negative       ECG Results              EKG 12-lead (Final result)  Result time 04/12/22 12:51:09      Final result by Interface, Lab In [facility] (04/12/22 12:51:09)                   Narrative:    Test Reason : R73.9,    Vent. Rate : 118 BPM     Atrial Rate : 111 BPM     P-R Int : 000 ms          QRS Dur : 162 ms      QT Int : 436 ms       P-R-T Axes : 000 -14 052 degrees     QTc Int : 611 ms    Wide QRS rhythm  Right bundle branch block  ST elevation anterior leads differential includes anterior STEMI vs,  repolarization abnormality  Abnormal ECG  When compared with ECG of 05-APR-2022 11:41,  Wide QRS rhythm has replaced Sinus rhythm  Vent. rate has increased BY  48 BPM  Confirmed by Megha Stock MD ([id number]) on 4/12/2022 12:51:00 PM    Referred By: [email or web address]   SELF           Confirmed By:Megha Stock MD                                    Results for orders placed during the hospital encounter of 01/25/22    Echo    Interpretation Summary  · There is abnormal septal wall motion.  · The left ventricle is normal in size with low normal systolic function.  · The estimated ejection fraction is 50%.  · Normal left ventricular diastolic function.  · Mild left atrial enlargement.  · Normal right ventricular size with normal right ventricular systolic function.  · Mild mitral regurgitation.  · There are segmental left ventricular wall motion  abnormalities.  · Mild tricuspid regurgitation.  · Elevated central venous pressure (15 mmHg).      NM Gastric Emptying  Narrative: EXAMINATION:  NM GASTRIC EMPTYING    CLINICAL HISTORY:  chronic nausea in diabetic;    TECHNIQUE:  The patient received 1.0 mCi orally of sulfur colloid labeled meal in less than 10 minutes.    Anterior and posterior projection images were obtained immediately and at 60 minute intervals for 4 hours.    Geometric mean of the anterior and the posterior images was generated. The decay-corrected time activity curve and the percentage of the retention and emptying were obtained.    COMPARISON:  CT abdomen pelvis 04/13/2022.    FINDINGS:  At 4 hour(s) the percentage of retention is 2 % (normal retention at 4 hours is 10% and lower).  Impression: Normal gastric emptying time.    I, Bam Booth MD, attest that I reviewed and interpreted the images.    Electronically signed by resident: Cisco Alvarado MD  Date:    04/29/2022  Time:    14:09    Electronically signed by: Bam Booth  Date:    04/29/2022  Time:    14:18      Labs and Imaging within the last 24 hours listed above were reviewed.       Diet: Diet diabetic [facility]; 2000 Calorie  Diet diabetic  Significant LDAs:   IV Access Type: Peripheral  Urinary Catheter Indication if present: Patient Does Not Have Urinary Catheter  Other Lines/Tubes/Drains:         Goals of Care:    Previous admission:  4/5/22  Likely prognosis:  Fair  Code Status: DNR  Comfort Only: No  Hospice: No  Goals at discharge: remain at home, with physician follow-up    Discharge Planning   ALLIE: 5/2/2022     Code Status: DNR   Is the patient medically ready for discharge?: Yes    Reason for patient still in hospital (select all that apply): Patient trending condition and Consult recommendations  Discharge Plan A: Home, Home Health, Other (24/7 sitters and family support)   Discharge Delays: None known at this time

## 2022-05-02 NOTE — ASSESSMENT & PLAN NOTE
- pt well known to wound care dept from previous admissions.  - admitted for elevated BG and found to be in DKA.  - pt recently diagnosed with an Unstageable wound to left buttocks.  - injury resolved and scar tissue present.  - pt now with blachable redness to left buttocks. Skin remains intact.  - pt is able to turn independently in bed. Encouraged pt to turn q2h.   - pt/RN reports pending discharge today.   - continue barrier cream bid/prn.   - discussed findings with RN. Waffle overlay recommended if dc delayed or cancelled.  - nursing to maintain pressure injury prevention measures.

## 2022-05-02 NOTE — HPI
Kamar Muñoz is a 76 year old male with a past medical history of HTN, Type 2 DM, CAD, STEMI, HLD, paroxysmal Afib, CVA, seizures and recurrent DKA.  He presented to [facility] ED on 4/11 with elevated blood glucose.  He was discharged from [facility] on 4/10 after being admitted for DKA on 4/5.  At time of exam patient is alert but altered and unable to provide any history.  Spoke with patient's daughter who states she is a primary caregiver at home and obtained history as well as review of chart.  Per family he was not feeling well after discharge to home and vomited several times on 4/11. Unknown characteristics of emesis. Finger stick at home read High with unreadable blood glucose.  At this time daughter gave him 14 units of insulin and sublingual zofran. Other than malaise and nausea patient was not exhibiting any other signs/symptoms at home.  In ER BG found to be 1015 with pCO2 6 and anion gap 35.  Hyperkalemic with K 7.0 and some EKG changes when compared to previous EKG.  Given calcium gluconate, and started on insulin gtt as well as subQ long acting insulin. Critical Care Medicine consulted for DKA and admitted to MICU. Pt is known to wound care from previous admission. Consult placed for evaluation as pt is at high risk for developing skin injuries.

## 2022-05-02 NOTE — PROGRESS NOTES
[facility] - Telemetry [facility] Medicine  Telemedicine Progress Note    Patient Name: Kamar Muñoz  MRN: [id number]  Patient Class: IP- Inpatient   Admission Date: 4/11/2022  Length of Stay: 20 days  Attending Physician: Tiffany Awad MD  Primary Care Provider: Basim Guerrero MD      Subjective:     Principal Problem:Pulmonary cavitary lesion    HPI:  Mr. Kamar Muñoz is a 76 y.o. male with a past medical history of HTN, Type 2 DM, CAD, STEMI, HLD, paroxysmal Afib, CVA, seizures and recurrent DKA.  He presented to [facility] ED on 4/11 with elevated blood glucose.  He was discharged from [facility] on 4/10 after being admitted for DKA on 4/5.  At time of exam patient is alert but altered and unable to provide any history.  Spoke with patient's daughter who states she is a primary caregiver at home and obtained history as well as review of chart.  Per family he was not feeling well after discharge to home and vomited several times on 4/11. Unknown characteristics of emesis. Finger stick at home read High with unreadable blood glucose.  At this time daughter gave him 14 units of insulin and sublingual zofran. Other than malaise and nausea patient was not exhibiting any other signs/symptoms at home.  In ER BG found to be 1015 with pCO2 6 and anion gap 35.  Hyperkalemic with K 7.0 and some EKG changes when compared to previous EKG.  Given calcium gluconate, and started on insulin gtt as well as subQ long acting insulin.      Critical Care Medicine consulted for DKA and admitted to MICU.        Overview/Hospital Course:  Admitted to MICU on 4/11 for DKA with BG >1000, pCO2 6, AG 35, and hyperkalemia.  Given Calcium gluconate and started on insulin gtt in ED.  Hyperkalemia not improved on repeat labs and bolused with IV insulin.  Infectious workup sent and broad spectrum abx started.  4/12 potassium improving with insulin. 4/13 Gap closed, insulin gtt turned off and switched to subq insulin. Endocrine  consulted for insulin mgmt. He was stepped down to  4/14.    Since step down stable. Advanced diet. Endocrine following and titrating insulin. Poor po intake making it difficult to adjust insulin. CT chest with cavitary lesion, pulmonary and ID consulted. SNF once medically stable for dc.      Telemedicine  This service was provided by Virtual Visit.    Patient was transferred to [facility] Medicine on:  04/22/2022     Chief Complaint   Patient presents with    Altered Mental Status     The patient location is: 8092/8092 A   Admitted 4/11/2022  8:55 PM  Present with the patient at the time of the telemed/virtual assessment: Telepresenter    Interval History / Events Overnight:   The patient is able to provide adequate history. Additional history was obtained from past medical records. No significant events reported by Nursing.    Patient complains of feeling weak. Symptoms have been unchanged since yesterday. Associated symptoms include: fatigue. Symptoms are stable.     Lab test(s) reviewed: hyperglycemia    Review of Systems   Constitutional:  Negative for fever.   Respiratory:  Negative for shortness of breath.    Musculoskeletal:  Positive for gait problem.   Neurological:  Positive for weakness.     Objective:     Vital Signs (Most Recent):  Temp: 97.5 °F (36.4 °C) (05/01/22 0803)  Pulse: (!) 52 (05/01/22 0803)  Resp: 20 (05/01/22 0803)  BP: (!) 165/72 (05/01/22 0803)  SpO2: 95 % (05/01/22 0803)   Vital Signs (24h Range):  Temp:  [97.5 °F (36.4 °C)-98.3 °F (36.8 °C)] 97.5 °F (36.4 °C)  Pulse:  [52-76] 52  Resp:  [18-20] 20  SpO2:  [93 %-99 %] 95 %  BP: (116-165)/(58-84) 165/72     Weight: 81.6 kg (179 lb 14.3 oz)  Body mass index is 25.09 kg/m².    Intake/Output Summary (Last 24 hours) at 5/1/2022 1009  Last data filed at 5/1/2022 0848  Gross per 24 hour   Intake 400 ml   Output --   Net 400 ml        Physical Exam  Constitutional:       General: He is not in acute distress.     Appearance: Normal  appearance.   Eyes:      General: Lids are normal. No scleral icterus.        Right eye: No discharge.         Left eye: No discharge.      Conjunctiva/sclera: Conjunctivae normal.   Neck:      Trachea: Phonation normal.   Cardiovascular:      Rate and Rhythm: Bradycardia present.      Comments: Monitor / Vital signs reviewed at time of visit  Pulmonary:      Effort: Pulmonary effort is normal. No tachypnea, accessory muscle usage or respiratory distress.   Abdominal:      General: There is no distension.   Skin:     Coloration: Skin is not cyanotic.   Neurological:      Mental Status: He is alert. He is not disoriented.   Psychiatric:         Attention and Perception: Attention normal.         Mood and Affect: Affect normal. Mood is depressed.         Behavior: Behavior is cooperative.       Significant Labs:   Recent Labs   Lab 12/29/21  0810 01/26/22  1512 04/05/22  1138   HGBA1C 12.3* 11.5* 9.7*       Recent Labs   Lab 04/30/22  2036 04/30/22  2155 05/01/22  0833   POCTGLUCOSE 336* 250* 200*       Recent Labs   Lab 04/25/22 0407 04/27/22  0459 04/29/22  0335   WBC 7.29 6.86 5.46   HGB 11.4* 11.2* 11.6*   HCT 34.4* 35.1* 35.5*    243 227       Recent Labs   Lab 04/25/22 0407 04/27/22  0459 04/29/22  0335   GRAN 58.2  4.2 53.1  3.6 49.6  2.7   LYMPH 28.4  2.1 31.5  2.2 31.5  1.7   MONO 7.0  0.5 8.6  0.6 11.4  0.6   EOS 0.4 0.4 0.4       Recent Labs   Lab 04/25/22  0407 04/27/22  0459 04/29/22  0335    135* 138   K 4.5 4.0 3.7    100 103   CO2 26 27 25   BUN 21 20 17   CREATININE 0.9 0.8 0.8   * 135* 40*   CALCIUM 8.3* 8.4* 8.5*   ALBUMIN 2.4* 2.4* 2.6*   MG 1.5* 1.5* 1.9   PHOS 2.9 4.1 2.6*       Recent Labs   Lab 04/25/22 0407 04/27/22  0459 04/29/22  0335   ALKPHOS 103 97 97   ALT 36 38 54*   AST 52* 47* 88*   PROT 5.5* 5.6* 5.8*   BILITOT 0.5 0.5 0.4       Recent Labs   Lab 02/19/22  2242 02/20/22  0046 03/03/22  2113 03/05/22  1037 03/05/22  1939 03/07/22 2043  04/11/22  2236 04/12/22  0207 04/12/22  0618 04/12/22  1051 04/12/22  1324   PROCAL 0.93*  --   --   --   --   --   --  1.18*  --   --   --    LACTATE  --    < >  --    < > 1.0  --    < > 8.2* 4.2* 3.2* 2.0   DDIMER 0.84*   < > 0.50*  --  0.50* 0.43  --   --   --   --   --    FERRITIN 564*   < > 300  --  354* 334*  --   --   --   --   --    SEDRATE 92*  --   --   --   --   --   --   --   --   --   --     < > = values in this interval not displayed.       Results for orders placed or performed in visit on 05/05/14   Vitamin D   Result Value Ref Range    Vit D, 25-Hydroxy 24 (L) 30 - 96 ng/mL     SARS-CoV2 (COVID-19) Qualitative PCR (no units)   Date Value   03/21/2022 Not Detected   03/17/2022 Not Detected   03/14/2022 Not Detected   03/08/2022 Not Detected     POC Rapid COVID (no units)   Date Value   04/05/2022 Negative   02/17/2022 Positive (A)   01/25/2022 Negative   01/12/2022 Negative   01/09/2022 Negative       ECG Results              EKG 12-lead (Final result)  Result time 04/12/22 12:51:09      Final result by Interface, Lab In [facility] (04/12/22 12:51:09)                   Narrative:    Test Reason : R73.9,    Vent. Rate : 118 BPM     Atrial Rate : 111 BPM     P-R Int : 000 ms          QRS Dur : 162 ms      QT Int : 436 ms       P-R-T Axes : 000 -14 052 degrees     QTc Int : 611 ms    Wide QRS rhythm  Right bundle branch block  ST elevation anterior leads differential includes anterior STEMI vs,  repolarization abnormality  Abnormal ECG  When compared with ECG of 05-APR-2022 11:41,  Wide QRS rhythm has replaced Sinus rhythm  Vent. rate has increased BY  48 BPM  Confirmed by Megha Stock MD ([id number]) on 4/12/2022 12:51:00 PM    Referred By: [email or web address]   SELF           Confirmed By:Megha Stock MD                                    Results for orders placed during the hospital encounter of 01/25/22    Echo    Interpretation Summary  · There is abnormal septal wall motion.  · The left ventricle is normal in  size with low normal systolic function.  · The estimated ejection fraction is 50%.  · Normal left ventricular diastolic function.  · Mild left atrial enlargement.  · Normal right ventricular size with normal right ventricular systolic function.  · Mild mitral regurgitation.  · There are segmental left ventricular wall motion abnormalities.  · Mild tricuspid regurgitation.  · Elevated central venous pressure (15 mmHg).      NM Gastric Emptying  Narrative: EXAMINATION:  NM GASTRIC EMPTYING    CLINICAL HISTORY:  chronic nausea in diabetic;    TECHNIQUE:  The patient received 1.0 mCi orally of sulfur colloid labeled meal in less than 10 minutes.    Anterior and posterior projection images were obtained immediately and at 60 minute intervals for 4 hours.    Geometric mean of the anterior and the posterior images was generated. The decay-corrected time activity curve and the percentage of the retention and emptying were obtained.    COMPARISON:  CT abdomen pelvis 04/13/2022.    FINDINGS:  At 4 hour(s) the percentage of retention is 2 % (normal retention at 4 hours is 10% and lower).  Impression: Normal gastric emptying time.    I, Bam Booth MD, attest that I reviewed and interpreted the images.    Electronically signed by resident: Cisco Alvarado MD  Date:    04/29/2022  Time:    14:09    Electronically signed by: Bam Booth  Date:    04/29/2022  Time:    14:18      Labs and Imaging within the last 24 hours listed above were reviewed.       Diet: Diet diabetic [facility]; 2000 Calorie  Significant LDAs:   IV Access Type: Peripheral  Urinary Catheter Indication if present: Patient Does Not Have Urinary Catheter  Other Lines/Tubes/Drains:         Goals of Care:    Previous admission:  4/5/22  Likely prognosis:  Fair  Code Status: DNR  Comfort Only: No  Hospice: No  Goals at discharge: remain at home, with physician follow-up    Discharge Planning   ALLIE: 5/3/2022     Code Status: DNR   Is the patient medically ready  "for discharge?: Yes    Reason for patient still in hospital (select all that apply): Patient trending condition and Consult recommendations  Discharge Plan A: Home, Home Health, Other (24/7 sitters and family support)   Discharge Delays: None known at this time      Assessment/Plan:      * Pulmonary cavitary lesion  - As per CT, noted about a month ago  - Repeat CT Chest without contrast obtained on 4/19 revealed "an evolving appearance of a right upper lobe posterior segment cavitary lesion with internal dependent mass; the cavity demonstrates a thinner wall and has decreased in diameter.  There is adjacent contracture of parenchyma.  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  The central debris is decreased in size,, now measuring 1.0 cm (axial series 4, image 81), previously 2.0 cm, and various other nodules".   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  - 1/11/2022 Culture with MAC pending susceptibilities   - 1/11/2022 2nd AFB culture with pending results  - Differential includes MAC, aspergillosis, chronic aspiration and less likely malignancy   - Pulmonary rec Augmentin x 7 days   - Induced sputum and send for cultures ordered, and AFB - has not been able to supply sample  - ID consulted and recommend repeat AFB sputum  - Poor candidate for surgical intervention given he is on triple therapy (eliquis + DAPT) for both afib and recent STEMI with LAUREN stent placed in January  - suspect his weight loss and recent decline worsened by underlying infection  - Repeat Chest CT in 3 months - 7/2022   - No indication for bronchoscopy at this time  - Can continue follow up with Dr. Louie outpatient.   - On RA and denies respiratory symptoms    Nausea  Numerous episodes of nausea (and vomiting at home) during this admit and with prior admits  -no history of peptic ulcer disease per the patient but definite reflux symptoms  -therapy with pantoprazole " alone ineffective; symptoms persist with addition of Carafate.  -suspect is multifactorial with GERD and poor gastric emptying suspected  -he is not a candidate for Reglan due to history of seizure  -currently requiring scheduled Zofran with meals to improve symptoms  -he is having regular bowel movements which are soft  -discussed with GI and proceeded with GES which was normal    Bacterial pneumonia  Completed 7 days of Augmentin    Goals of care, counseling/discussion  - DNR as per ICU discussion with patient and family    Functional urinary incontinence  Remains requiring adult diapers  Post-void residual ordered to assess for retention  Initiate timed toileting for bladder training    Severe malnutrition  Nutrition consulted. Most recent weight and BMI monitored- Body mass index is 25.09 kg/m².      -he is drinking more Boost than eating food so increased Boost supplements to 2 servings with each tray.     Measurements:  Wt Readings from Last 1 Encounters:   04/27/22 81.6 kg (179 lb 14.3 oz)   Body mass index is 25.09 kg/m².    Recommendations: Recommendation/Intervention: 1. Continue current Diabetic diet + ONS. 2. RD to monitor & follow-up.  Goals: Meet % EEN, EPN by RD f/u date    Patient has been screened and assessed by RD. RD will follow patient.    Discharge planning issues  PT/OT recommends skilled nursing facility for ongoing therapy; patient acknowledges and agrees with the need for ongoing therapy but unfortunately he is required to pay a co-pay for further SNF days.    He feels his only options to go home with limited sitter assistance and home health; his son has made arrangements for him to go to his preferred facility, [facility] but the patient feels he cannot manage the financial implications of going there; patient's son is committed to getting patient to [facility] which is likely his safest disposition as patient needs 24/7 care.  Patient continues to require 24/7 care for  safety, ambulation, meal preparation, and medication management. Patient requesting to remain in hospital, refuses NH placement, and does not have adequate 24/7 care in place for safe discharge to home. Family continues to support NH placement at [facility].   Capacity for participation in discharge plan unclear; concern for depression and reluctance to leave his seriously ill wife hospitalized in this hospital.   Consulted Psychiatry for capacity assessment, evaluation of depression.  Patient found to have capacity. Zoloft started for depression. Continues to refuse NH; requires 24/7 assistance for safety and medication / meal management.    Ketosis-prone diabetes mellitus  - DKA now resolved  - Endocrine following and adjusting insulin regimen as needed  - SSI provided for corrective dosing  - Hypoglycemic protocol in effect  -Endocrine following due to erratic glucoses; notified of plan to discharge home in light of re-admits with DKA and eats regular diet at home  -patient needs assistance with 3 discrete meals per day and insulin management at home.    Focal seizures  - History of seizures treated with lacosamide.    - Continue home lacosamide    Coronary artery disease  - Stable  - Continue home regimen of atorvastatin, clopidogrel, losartan, and metoprolol  - ASA discontinue 4/13 due to bleeding risk with triple therapy; continuing Plavix and Eliquis    Paroxysmal atrial fibrillation  - History of A-fib.    - Continue home regimen of amiodarone, apixaban, and metoprolol    Essential hypertension  - resume home meds as tolerated  - holding Cozaar        Active Hospital Problems    Diagnosis  POA    *Pulmonary cavitary lesion [J98.4]  Yes    Nausea [R11.0]  Yes    Dyslipidemia associated with type 2 diabetes mellitus [E11.69, E78.5]  Yes    Bacterial pneumonia [J15.9]  Yes    Goals of care, counseling/discussion [Z71.89]  Not Applicable    Functional urinary incontinence [R39.81]  Yes    Severe  malnutrition [E43]  Yes    Discharge planning issues [Z02.9]  Not Applicable    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]  Yes    Focal seizures [R56.9]  Yes     Chronic    Coronary artery disease [I25.10]  Yes    Paroxysmal atrial fibrillation [I48.0]  Yes     Chronic    Essential hypertension [I10]  Yes     Chronic      Resolved Hospital Problems    Diagnosis Date Resolved POA    Encephalopathy [G93.40] 04/13/2022 Yes    Lactic acidosis [E87.2] 04/21/2022 Yes    Diabetic ketoacidosis without coma associated with type 2 diabetes mellitus [E11.10] 04/20/2022 Yes    Hyperkalemia [E87.5] 04/13/2022 Yes    BRENDAN (acute kidney injury) [N17.9] 04/21/2022 Yes       Inpatient Medications Prescribed for Management of Current Problems:     Scheduled Meds:    amiodarone  200 mg Oral Daily    apixaban  5 mg Oral BID    atorvastatin  40 mg Oral Daily    clopidogreL  75 mg Oral Daily    docusate sodium  50 mg Oral BID    insulin aspart U-100  12 Units Subcutaneous with dinner    [START ON 5/2/2022] insulin aspart U-100  18 Units Subcutaneous with breakfast    insulin aspart U-100  18 Units Subcutaneous with lunch    insulin detemir U-100  6 Units Subcutaneous BID    lacosamide  100 mg Oral Q12H    metoprolol succinate  50 mg Oral Daily    ondansetron  4 mg Oral TID WM    pantoprazole  40 mg Oral Daily    polyethylene glycol  17 g Oral Daily    senna-docusate 8.6-50 mg  1 tablet Oral BID    [START ON 5/2/2022] sertraline  25 mg Oral Daily    sucralfate  1 g Oral QID (AC & HS)    tamsulosin  0.4 mg Oral Daily     Continuous Infusions:   As Needed: acetaminophen, calcium carbonate, cloNIDine, dextrose 10%, dextrose 10%, diphenhydrAMINE, glucagon (human recombinant), glucose, glucose, hydrALAZINE, insulin aspart U-100, insulin aspart U-100, melatonin, ondansetron, prochlorperazine, senna, simethicone     VTE Risk Mitigation (From admission, onward)         Ordered     apixaban tablet 5 mg  2 times daily          04/20/22 1209              I have assessed these finding virtually using telemed platform and with assistance of bedside nurse     The attending portion of this evaluation, treatment, and documentation was performed per Tiffany Awad MD via Telemedicine AudioVisual using the secure [employer] software platform with 2 way audio/video. The provider was located off-site and the patient is located in the hospital. The aforementioned video software was utilized to document the relevant history and physical exam    Tiffany Awad MD  Department of Hospital Medicine   [facility] - Telemetry Stepdown

## 2022-05-02 NOTE — PLAN OF CARE
Problem: Occupational Therapy  Goal: Occupational Therapy Goal  Description: Goals to be met by: 4/29/2022     Patient will increase functional independence with ADLs by performing:    UE Dressing with Set-up Assistance. Met 5/2/22  Updated to Mod I  LE Dressing with Minimal Assistance using AD PRN. Goal met 5/2/22  Updated to Supervision W/AD PRN  Grooming while standing at sink with Stand-by Assistance.  Toileting from toilet with Supervision for hygiene and clothing management.   Step transfer with Supervision using AD PRN.  Toilet transfer to toilet with Stand-by Assistance using AD PRN.    Outcome: Ongoing, Progressing     Pts goals and revised goals remain appropriate.

## 2022-05-02 NOTE — PLAN OF CARE
Problem: Pain Acute  Goal: Acceptable Pain Control and Functional Ability  Outcome: Ongoing, Progressing     Problem: Diabetes Comorbidity  Goal: Blood Glucose Level Within Targeted Range  Outcome: Ongoing, Progressing  Alert and oriented x4. Vitals stable. Free from falls and injuries during shift. No concerns or requests voiced. Bed low with side rails up x2. Call bell within reach. Will continue plan of care.

## 2022-05-02 NOTE — NURSING
Pt was able to ambulate to the restroom with stand by assist and was able to use his walker to help assist as well.

## 2022-05-02 NOTE — ASSESSMENT & PLAN NOTE
Key History and Diagnostic Findings  - Admit for recurrent DKA  - A1c of 9.7 on 2022 from 11.5 on 2022    - Home Regimen: Levemir 8 units daily, aspart 8 units TIDWM   - Weight based dosin kg x 0.5 = 41 TDD x 0.5 = 20 basal / 20 prandial   - 1700/TDD = 41 (estimated insulin sensitivity factor)   - 450/TDD = 11 (estimated starting carb ratio for prandial dosing)    -  Glucose Goals: 140-180mg/dL  -  24 hour glucose trend: 200-330 no lows  -  Variable diet %, inconsistent carbs with meals, diet indiscretions, intermittent nausea, variable diet/po intake  -  Boosts with meals  -  S/p GES normal    Plan  -  Levemir 6 units BID (fasting hypoglycemia with 8 units bid)  -  Aspart 18 units BF, 18 units L, 14 units D  -  Would likely better benefit from Carb Ratio instead with carb counting due to dietary indiscretion and variable intake and boosts with meals  -  Prandial insulin with range resulted in large fluctuations of global hyperglycemia and hypoglycemia, overcorrections  -  Moderate correction scale    For DC : can consider sending on above regimen

## 2022-05-02 NOTE — TELEPHONE ENCOUNTER
----- Message from Deloris Loaiza MA sent at 4/27/2022  1:33 PM CDT -----  Regarding: Requesting a Dexcom G6    ----- Message -----  From: Umu Esquivel  Sent: 4/27/2022  11:36 AM CDT  To: Yolanda GRAHAM Staff    Type:  Patient  Call    Who Called: Kamar Downs   Would the patient rather a call back or a response via [facility]? Call back   Best Call Back Number: [phone]   Additional Information:  son on the line.. pt is in the hospital but needs a glucose monitor ordered before he gets out ...Dexicom G6 is what is needed

## 2022-05-02 NOTE — CONSULTS
[facility] - Telemetry Stepdown  Skin Integrity [facility]RAD  Consult Note    Patient Name: Kamar Muñoz  MRN: [id number]  Admission Date: 4/11/2022  Hospital Length of Stay: 21 days  Attending Physician: Tiffany Awad MD  Primary Care Provider: Basim Guerrero MD     Consults  Subjective:     History of Present Illness:  Kamar Muñoz is a 76 year old male with a past medical history of HTN, Type 2 DM, CAD, STEMI, HLD, paroxysmal Afib, CVA, seizures and recurrent DKA.  He presented to [facility] ED on 4/11 with elevated blood glucose.  He was discharged from [facility] on 4/10 after being admitted for DKA on 4/5.  At time of exam patient is alert but altered and unable to provide any history.  Spoke with patient's daughter who states she is a primary caregiver at home and obtained history as well as review of chart.  Per family he was not feeling well after discharge to home and vomited several times on 4/11. Unknown characteristics of emesis. Finger stick at home read High with unreadable blood glucose.  At this time daughter gave him 14 units of insulin and sublingual zofran. Other than malaise and nausea patient was not exhibiting any other signs/symptoms at home.  In ER BG found to be 1015 with pCO2 6 and anion gap 35.  Hyperkalemic with K 7.0 and some EKG changes when compared to previous EKG.  Given calcium gluconate, and started on insulin gtt as well as subQ long acting insulin. Critical Care Medicine consulted for DKA and admitted to MICU. Pt is known to wound care from previous admission. Consult placed for evaluation as pt is at high risk for developing skin injuries.       Scheduled Meds:   amiodarone  200 mg Oral Daily    apixaban  5 mg Oral BID    atorvastatin  40 mg Oral Daily    clopidogreL  75 mg Oral Daily    docusate sodium  50 mg Oral BID    insulin aspart U-100  14 Units Subcutaneous with dinner    insulin aspart U-100  18 Units Subcutaneous with breakfast    insulin aspart U-100  18 Units  Subcutaneous with lunch    insulin detemir U-100  6 Units Subcutaneous BID    lacosamide  100 mg Oral Q12H    losartan  25 mg Oral Daily    metoprolol succinate  50 mg Oral Daily    ondansetron  4 mg Oral TID WM    pantoprazole  40 mg Oral Daily    polyethylene glycol  17 g Oral Daily    senna-docusate 8.6-50 mg  1 tablet Oral BID    sertraline  25 mg Oral Daily    sucralfate  1 g Oral QID (AC & HS)    tamsulosin  0.4 mg Oral Daily     Continuous Infusions:  PRN Meds:acetaminophen, calcium carbonate, cloNIDine, dextrose 10%, dextrose 10%, diphenhydrAMINE, glucagon (human recombinant), glucose, glucose, hydrALAZINE, insulin aspart U-100, insulin aspart U-100, melatonin, ondansetron, prochlorperazine, senna, simethicone    Review of patient's allergies indicates:   Allergen Reactions    Iodine      Other reaction(s): swelling  Other reaction(s): Itching  Other reaction(s): Rash        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    Hyperlipidemia     Hypertension     Kidney stone     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 8/2/2012    STEMI involving right coronary artery 1/9/2022    Type II or unspecified type diabetes mellitus with neurological manifestations, uncontrolled(250.62) 3/8/2013    Urinary tract infection      Past Surgical History:   Procedure Laterality Date    BACK SURGERY      CATARACT EXTRACTION W/  INTRAOCULAR LENS IMPLANT Right     Per Dr Romero note 11/2018    COLONOSCOPY N/A 1/28/2019    Procedure: COLONOSCOPY Suprep;  Surgeon: Anh Johnson MD;  Location: [facility] ENDO;  Service: Endoscopy;  Laterality: N/A;    EYE SURGERY      HERNIA REPAIR      LEFT HEART CATHETERIZATION Left 1/9/2022    Procedure: CATHETERIZATION, HEART, LEFT;  Surgeon: Will Hurst III, MD;  Location: [facility] CATH LAB/EP;  Service:  Cardiology;  Laterality: Left;    renal stones      SHOULDER OPEN ROTATOR CUFF REPAIR         Family History       Problem Relation (Age of Onset)    Diabetes Father          Tobacco Use    Smoking status: Former Smoker     Packs/day: 1.50     Years: 25.00     Pack years: 37.50     Quit date: 1983     Years since quittin.3    Smokeless tobacco: Never Used   Substance and Sexual Activity    Alcohol use: No    Drug use: No    Sexual activity: Yes     Partners: Female     Review of Systems   Skin:  Positive for wound.     Objective:     Vital Signs (Most Recent):  Temp: 97.9 °F (36.6 °C) (22 1152)  Pulse: 67 (22 1152)  Resp: 18 (22 1152)  BP: (!) 114/57 (22 1152)  SpO2: (!) 93 % (22 1152)   Vital Signs (24h Range):  Temp:  [97.5 °F (36.4 °C)-98 °F (36.7 °C)] 97.9 °F (36.6 °C)  Pulse:  [55-68] 67  Resp:  [17-20] 18  SpO2:  [93 %-97 %] 93 %  BP: (101-156)/(56-71) 114/57     Weight: 81.6 kg (179 lb 14.3 oz)  Body mass index is 25.09 kg/m².  Physical Exam  Constitutional:       Appearance: Normal appearance.   Skin:     General: Skin is warm and dry.      Findings: No lesion.   Neurological:      Mental Status: He is alert.       Laboratory:  All pertinent labs reviewed within the last 24 hours.    Diagnostic Results:  None        Assessment/Plan:         CONRAD Skin Integrity Evaluation    Skin Integrity CONRAD evaluation of patient as part of the comprehensive skin care team.   He has been admitted for 21 days. Skin injury was noted on 22. POA yes.    Buttocks            At high risk for skin breakdown  - pt well known to wound care dept from previous admissions.  - admitted for elevated BG and found to be in DKA.  - pt recently diagnosed with an Unstageable wound to left buttocks.  - injury resolved and scar tissue present.  - pt now with blachable redness to left buttocks. Skin remains intact.  - pt is able to turn independently in bed. Encouraged pt to turn q2h.   - pt/RN  reports pending discharge today.   - continue barrier cream bid/prn.   - discussed findings with RN. Waffle overlay recommended if dc delayed or cancelled.  - nursing to maintain pressure injury prevention measures.         Thank you for your consult. I will follow-up with patient. Please contact us if you have any additional questions.       Ashleigh Tesfaye NP  [facility]  [facility] - Telemetry Stepdown

## 2022-05-02 NOTE — SUBJECTIVE & OBJECTIVE
Nigel Rodarte Jr.   9/18/2017 8:00 AM   Anticoagulation Therapy Visit    Description:  74 year old male   Provider:  [facility] ANTICOAGULATION   Department:  [facility]           INR as of 9/18/2017     Today's INR 1.6!      Anticoagulation Summary as of 9/18/2017     INR goal 2.0-3.0   Today's INR 1.6!   Full instructions 9/18: 7.5 mg; Otherwise 5 mg on Sun, Wed; 7.5 mg all other days   Next INR check 9/27/2017    Indications   Long-term (current) use of anticoagulants [Z79.01] [Z79.01]  Atrial fibrillation (H) [I48.91] [I48.91]         Your next Anticoagulation Clinic appointment(s)     Sep 27, 2017  7:00 AM CDT   Anticoagulation Visit with  ANTICOAGULATION   [facility] ([facility])    [address] [place] [id number]   [phone]              Contact Numbers     Anticoagulant (INR) Clinic Number: [id number]          September 2017 Details    Sun Mon Tue Wed Thu Fri Sat          1               2                 3               4               5               6               7               8               9                 10               11               12               13               14               15               16                 17               18      7.5 mg   See details      19      7.5 mg         20      5 mg         21      7.5 mg         22      7.5 mg         23      7.5 mg           24      5 mg         25      7.5 mg         26      7.5 mg         27            28               29               30                Date Details   09/18 This INR check       Date of next INR:  9/27/2017         How to take your warfarin dose     To take:  5 mg Take 1 of the 5 mg tablets.    To take:  7.5 mg Take 1.5 of the 5 mg tablets.            "Interval HPI:   Overnight events:  BG above goal    Eatin%  Nausea: No  Hypoglycemia and intervention: No  Fever: No  TPN and/or TF: No  If yes, type of TF/TPN and rate: NA    BP (!) 156/69 (BP Location: Left arm, Patient Position: Lying)   Pulse 63   Temp 97.5 °F (36.4 °C) (Oral)   Resp 18   Ht 5' 11" (1.803 m)   Wt 81.6 kg (179 lb 14.3 oz)   SpO2 96%   BMI 25.09 kg/m²     Labs Reviewed and Include    Recent Labs   Lab 22  0553   *   CALCIUM 8.5*   ALBUMIN 2.6*   PROT 6.1      K 4.4   CO2 28      BUN 18   CREATININE 0.8   ALKPHOS 97   ALT 43   AST 38   BILITOT 0.5     Lab Results   Component Value Date    WBC 5.39 2022    HGB 11.7 (L) 2022    HCT 36.5 (L) 2022    MCV 92 2022     2022     No results for input(s): TSH, FREET4 in the last 168 hours.  Lab Results   Component Value Date    HGBA1C 9.7 (H) 2022       Nutritional status:   Body mass index is 25.09 kg/m².  Lab Results   Component Value Date    ALBUMIN 2.6 (L) 2022    ALBUMIN 2.6 (L) 2022    ALBUMIN 2.4 (L) 2022     No results found for: PREALBUMIN    Estimated Creatinine Clearance: 81.1 mL/min (based on SCr of 0.8 mg/dL).    Accu-Checks  Recent Labs     22  1125 22  1547 22  2155 22  0833 22  1143 22  1540 22  0504 22  0804   POCTGLUCOSE 300* 210* 336* 250* 200* 193* 249* 279* 192* 273*       Current Medications and/or Treatments Impacting Glycemic Control  Immunotherapy:    Immunosuppressants       None          Steroids:   Hormones (From admission, onward)                Start     Stop Route Frequency Ordered    22 1047  melatonin tablet 6 mg  (Medication Panel)         -- Oral Nightly PRN 22 0936          Pressors:    Autonomic Drugs (From admission, onward)                None          Hyperglycemia/Diabetes Medications:   Antihyperglycemics (From admission, onward) "                Start     Stop Route Frequency Ordered    05/02/22 1645  insulin aspart U-100 pen 14 Units         -- SubQ with dinner 05/02/22 1004    05/02/22 0715  insulin aspart U-100 pen 18 Units         -- SubQ with breakfast 05/01/22 0913    05/01/22 1130  insulin aspart U-100 pen 18 Units         -- SubQ with lunch 05/01/22 0913    04/30/22 1000  insulin detemir U-100 pen 6 Units         -- SubQ 2 times daily 04/30/22 0945    04/25/22 0931  insulin aspart U-100 pen 1-10 Units         -- SubQ Before meals & nightly PRN 04/25/22 0832    04/17/22 0111  insulin aspart U-100 pen 4 Units         -- SubQ With snacks 04/17/22 0012

## 2022-05-02 NOTE — ASSESSMENT & PLAN NOTE
- Stable  - Continue home regimen of atorvastatin, clopidogrel, losartan, and metoprolol  - ASA discontinue 4/13 due to bleeding risk with triple therapy; continuing Plavix and Eliquis

## 2022-05-03 VITALS
RESPIRATION RATE: 18 BRPM | OXYGEN SATURATION: 95 % | WEIGHT: 179.88 LBS | BODY MASS INDEX: 25.18 KG/M2 | HEIGHT: 71 IN | HEART RATE: 63 BPM | SYSTOLIC BLOOD PRESSURE: 94 MMHG | TEMPERATURE: 98 F | DIASTOLIC BLOOD PRESSURE: 51 MMHG

## 2022-05-03 LAB
POCT GLUCOSE: 129 MG/DL (ref 70–110)
POCT GLUCOSE: 166 MG/DL (ref 70–110)
POCT GLUCOSE: 265 MG/DL (ref 70–110)
POCT GLUCOSE: 295 MG/DL (ref 70–110)
POCT GLUCOSE: 342 MG/DL (ref 70–110)
SARS-COV-2 RNA RESP QL NAA+PROBE: NOT DETECTED

## 2022-05-03 PROCEDURE — 1111F DSCHRG MED/CURRENT MED MERGE: CPT | Mod: 95,CPTII,, | Performed by: INTERNAL MEDICINE

## 2022-05-03 PROCEDURE — 99239 HOSP IP/OBS DSCHRG MGMT >30: CPT | Mod: 95,,, | Performed by: INTERNAL MEDICINE

## 2022-05-03 PROCEDURE — 25000003 PHARM REV CODE 250: Performed by: INTERNAL MEDICINE

## 2022-05-03 PROCEDURE — U0005 INFEC AGEN DETEC AMPLI PROBE: HCPCS | Performed by: INTERNAL MEDICINE

## 2022-05-03 PROCEDURE — 97530 THERAPEUTIC ACTIVITIES: CPT

## 2022-05-03 PROCEDURE — 1111F PR DISCHARGE MEDS RECONCILED W/ CURRENT OUTPATIENT MED LIST: ICD-10-PCS | Mod: 95,CPTII,, | Performed by: INTERNAL MEDICINE

## 2022-05-03 PROCEDURE — 99239 PR HOSPITAL DISCHARGE DAY,>30 MIN: ICD-10-PCS | Mod: 95,,, | Performed by: INTERNAL MEDICINE

## 2022-05-03 PROCEDURE — U0003 INFECTIOUS AGENT DETECTION BY NUCLEIC ACID (DNA OR RNA); SEVERE ACUTE RESPIRATORY SYNDROME CORONAVIRUS 2 (SARS-COV-2) (CORONAVIRUS DISEASE [COVID-19]), AMPLIFIED PROBE TECHNIQUE, MAKING USE OF HIGH THROUGHPUT TECHNOLOGIES AS DESCRIBED BY CMS-2020-01-R: HCPCS | Performed by: INTERNAL MEDICINE

## 2022-05-03 PROCEDURE — 63600175 PHARM REV CODE 636 W HCPCS: Performed by: GENERAL ACUTE CARE HOSPITAL

## 2022-05-03 RX ORDER — INSULIN ASPART 100 [IU]/ML
6-12 INJECTION, SOLUTION INTRAVENOUS; SUBCUTANEOUS
Refills: 0 | Status: ON HOLD
Start: 2022-05-03 | End: 2022-05-18 | Stop reason: HOSPADM

## 2022-05-03 RX ORDER — IBUPROFEN 200 MG
24 TABLET ORAL
Refills: 12
Start: 2022-05-03 | End: 2024-02-29

## 2022-05-03 RX ORDER — INSULIN ASPART 100 [IU]/ML
9-18 INJECTION, SOLUTION INTRAVENOUS; SUBCUTANEOUS
Refills: 0 | Status: ON HOLD
Start: 2022-05-03 | End: 2022-05-18 | Stop reason: HOSPADM

## 2022-05-03 RX ORDER — TAMSULOSIN HYDROCHLORIDE 0.4 MG/1
0.4 CAPSULE ORAL DAILY
Qty: 30 CAPSULE | Refills: 11
Start: 2022-05-03 | End: 2022-05-29

## 2022-05-03 RX ORDER — INSULIN ASPART 100 [IU]/ML
4 INJECTION, SOLUTION INTRAVENOUS; SUBCUTANEOUS
Refills: 0 | Status: ON HOLD
Start: 2022-05-03 | End: 2022-10-14 | Stop reason: HOSPADM

## 2022-05-03 RX ORDER — IBUPROFEN 200 MG
16 TABLET ORAL
Refills: 12
Start: 2022-05-03 | End: 2023-05-03

## 2022-05-03 RX ORDER — SERTRALINE HYDROCHLORIDE 25 MG/1
25 TABLET, FILM COATED ORAL DAILY
Qty: 30 TABLET | Refills: 11
Start: 2022-05-03 | End: 2022-09-06

## 2022-05-03 RX ORDER — INSULIN ASPART 100 [IU]/ML
1-10 INJECTION, SOLUTION INTRAVENOUS; SUBCUTANEOUS
Refills: 0 | Status: ON HOLD
Start: 2022-05-03 | End: 2022-05-18 | Stop reason: HOSPADM

## 2022-05-03 RX ORDER — INSULIN GLARGINE 100 [IU]/ML
6 INJECTION, SOLUTION SUBCUTANEOUS 2 TIMES DAILY
Qty: 15 ML | Refills: 3
Start: 2022-05-03 | End: 2022-05-18

## 2022-05-03 RX ORDER — ONDANSETRON 4 MG/1
4 TABLET, ORALLY DISINTEGRATING ORAL
Qty: 90 TABLET | Refills: 1
Start: 2022-05-03 | End: 2022-06-02

## 2022-05-03 RX ADMIN — DOCUSATE SODIUM 50 MG: 50 CAPSULE, LIQUID FILLED ORAL at 09:05

## 2022-05-03 RX ADMIN — METOPROLOL SUCCINATE 50 MG: 50 TABLET, EXTENDED RELEASE ORAL at 09:05

## 2022-05-03 RX ADMIN — SUCRALFATE 1 G: 1 SUSPENSION ORAL at 06:05

## 2022-05-03 RX ADMIN — ATORVASTATIN CALCIUM 40 MG: 20 TABLET, FILM COATED ORAL at 09:05

## 2022-05-03 RX ADMIN — SENNOSIDES AND DOCUSATE SODIUM 1 TABLET: 50; 8.6 TABLET ORAL at 09:05

## 2022-05-03 RX ADMIN — TAMSULOSIN HYDROCHLORIDE 0.4 MG: 0.4 CAPSULE ORAL at 09:05

## 2022-05-03 RX ADMIN — APIXABAN 5 MG: 5 TABLET, FILM COATED ORAL at 09:05

## 2022-05-03 RX ADMIN — INSULIN ASPART 8 UNITS: 100 INJECTION, SOLUTION INTRAVENOUS; SUBCUTANEOUS at 01:05

## 2022-05-03 RX ADMIN — ONDANSETRON 4 MG: 4 TABLET, ORALLY DISINTEGRATING ORAL at 09:05

## 2022-05-03 RX ADMIN — AMIODARONE HYDROCHLORIDE 200 MG: 200 TABLET ORAL at 09:05

## 2022-05-03 RX ADMIN — CLOPIDOGREL 75 MG: 75 TABLET, FILM COATED ORAL at 09:05

## 2022-05-03 RX ADMIN — PANTOPRAZOLE SODIUM 40 MG: 40 TABLET, DELAYED RELEASE ORAL at 09:05

## 2022-05-03 RX ADMIN — SUCRALFATE 1 G: 1 SUSPENSION ORAL at 12:05

## 2022-05-03 RX ADMIN — SERTRALINE HYDROCHLORIDE 25 MG: 25 TABLET ORAL at 09:05

## 2022-05-03 RX ADMIN — LOSARTAN POTASSIUM 25 MG: 25 TABLET, FILM COATED ORAL at 09:05

## 2022-05-03 RX ADMIN — POLYETHYLENE GLYCOL 3350 17 G: 17 POWDER, FOR SOLUTION ORAL at 09:05

## 2022-05-03 RX ADMIN — INSULIN ASPART 18 UNITS: 100 INJECTION, SOLUTION INTRAVENOUS; SUBCUTANEOUS at 09:05

## 2022-05-03 RX ADMIN — LACOSAMIDE 100 MG: 100 TABLET, FILM COATED ORAL at 09:05

## 2022-05-03 RX ADMIN — INSULIN ASPART 18 UNITS: 100 INJECTION, SOLUTION INTRAVENOUS; SUBCUTANEOUS at 01:05

## 2022-05-03 RX ADMIN — INSULIN ASPART 6 UNITS: 100 INJECTION, SOLUTION INTRAVENOUS; SUBCUTANEOUS at 05:05

## 2022-05-03 NOTE — NURSING
2000  Received report for pt from AM Nurse. Pt resting in bed. AAOx4; RA. Glucose 44 @1930; Pt assymptomatic. Treated w/PRN Med per MAR. On-call provider notified. VSS. No c/o pain/distress. Education regarding fall and safety precaution provided. Safety check performed. Call bell within reach. Will continue to monitor.    2045  Recheck glucose 116. Pt AAOx4; RA. No c/o pain/distress. Safety check performed. Call bell within reach. Will continue to monitor.    0000  Pt resting in bed. Medication administered per MAR. Safety check performed. Call bell within reach. Will continue to monitor.

## 2022-05-03 NOTE — PLAN OF CARE
NURSING HOME ORDERS    05/03/2022  [facility]  [facility] - TELEMETRY STEPDOWN  [address]  [facility] [address]  Dept: [phone]  Loc: [phone]     Admit to Nursing Home:  Regular bed    Diagnoses:  Active Hospital Problems    Diagnosis  POA    At high risk for skin breakdown [Z91.89]  Yes    Nausea [R11.0]  Yes    Dyslipidemia associated with type 2 diabetes mellitus [E11.69, E78.5]  Yes    Bacterial pneumonia [J15.9]  Yes    Pulmonary cavitary lesion [J98.4]  Yes    Goals of care, counseling/discussion [Z71.89]  Not Applicable    Functional urinary incontinence [R39.81]  Yes    Severe malnutrition [E43]  Yes    Discharge planning issues [Z02.9]  Not Applicable    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]  Yes    Focal seizures [R56.9]  Yes     Chronic    Coronary artery disease [I25.10]  Yes    Paroxysmal atrial fibrillation [I48.0]  Yes     Chronic    History of embolic stroke [Z86.73]  Not Applicable    Essential hypertension [I10]  Yes     Chronic      Resolved Hospital Problems    Diagnosis Date Resolved POA    *Diabetic ketoacidosis without coma associated with type 2 diabetes mellitus [E11.10] 04/20/2022 Yes    Encephalopathy [G93.40] 04/13/2022 Yes    Lactic acidosis [E87.2] 04/21/2022 Yes    Hyperkalemia [E87.5] 04/13/2022 Yes    BRENDAN (acute kidney injury) [N17.9] 04/21/2022 Yes       Code Status: DNR    Patient is homebound due to:  Diabetic ketoacidosis without coma associated with type 2 diabetes mellitus    Allergies:  Review of patient's allergies indicates:   Allergen Reactions    Iodine      Other reaction(s): swelling  Other reaction(s): Itching  Other reaction(s): Rash       Vitals:  Routine    Diet: SATHISH, NCS.    Glucose-control supplements with meals    Activities:   Up in a chair each morning as tolerated, Ambulate with assistance to bathroom and Activity as tolerated, scheduled walks daily    Labs: per facility protocol    Nursing  Precautions:  Aspiration , Fall, Seizure and Pressure ulcer prevention    Consults:   Wound Care.           Nutrition to evaluate and recommend ways to achieve consistent complex carbohydrate diet.  Would likely benefit from Carb Ratio instead of Carb Counting. Educate patient and family on maintaining CONSISTENT 60 GM COMPLEX CARBOHYDRATE PER MEAL.                   Diabetes Care:  SN to perform and educate Diabetic management with blood glucose monitoring:   Fingerstick blood sugar AC and HS. Report CBG < 70 or > 300 to physician.   If BG >350 do not feed patient. Give a correction insulin dose and re-start meal once BG closer to 200 mg/dL      Medications: Discontinue all previous medication orders, if any. See new list below.     Medication List      START taking these medications    * glucose 4 GM chewable tablet  Take 4 tablets (16 g total) by mouth as needed for Low blood sugar (50 - 70).     * glucose 4 GM chewable tablet  Take 6 tablets (24 g total) by mouth as needed for Low blood sugar (< 50).     ondansetron 4 MG Tbdl  Commonly known as: ZOFRAN-ODT  Take 1 tablet (4 mg total) by mouth 3 (three) times daily with meals.     sertraline 25 MG tablet  Commonly known as: ZOLOFT  Take 1 tablet (25 mg total) by mouth once daily.     tamsulosin 0.4 mg Cap  Commonly known as: FLOMAX  Take 1 capsule (0.4 mg total) by mouth once daily.         * This list has 2 medication(s) that are the same as other medications prescribed for you. Read the directions carefully, and ask your doctor or other care provider to review them with you.            CHANGE how you take these medications    * insulin aspart U-100 100 unit/mL (3 mL) Inpn pen  Commonly known as: NovoLOG  Inject 4 Units into the skin with snacks (>200).  What changed:   · how much to take  · how to take this  · when to take this  · reasons to take this  · additional instructions     * insulin aspart U-100 100 unit/mL (3 mL) Inpn pen  Commonly known as:  NovoLOG  Inject 1-10 Units into the skin before meals and at bedtime as needed (Hyperglycemia). **MODERATE CORRECTION DOSE**  Blood Glucose  mg/dL                  Pre-meal                2200  151-200                2 units                    1 unit  201-250                4 units                    3 units    251-300                6 units                    4 units    301-350                8 units                    5 units   >350                     10 units                  6 units  Administer subcutaneously if needed at times designated by monitoring schedule.  What changed:   · how much to take  · when to take this  · reasons to take this  · additional instructions     * insulin aspart U-100 100 unit/mL (3 mL) Inpn pen  Commonly known as: NovoLOG  Inject 9-18 Units into the skin daily with breakfast. Give 9 units if eats <50% of meal, give full dose of 18 units if eats >50% of meal    Administer subcutaneously with meal. HOLD prandial (mealtime) insulin if patient is NPO, unable to eat, or if Blood Glucose less than 70 mg/dL.    If patient ate prior to BG check, administer scheduled Novolog only (do not cover with correction dose at this time).  What changed: You were already taking a medication with the same name, and this prescription was added. Make sure you understand how and when to take each.     * insulin aspart U-100 100 unit/mL (3 mL) Inpn pen  Commonly known as: NovoLOG  Inject 9-18 Units into the skin with lunch. Give 9 units if eats <50% of meal, give full dose of 18 units if eats >50% of meal    Administer subcutaneously with meal. HOLD prandial (mealtime) insulin if patient is NPO, unable to eat, or if Blood Glucose less than 70 mg/dL.    If patient ate prior to BG check, administer scheduled Novolog only (do not cover with correction dose at this time).  What changed: You were already taking a medication with the same name, and this prescription was added. Make sure you understand how and when  to take each.     * insulin aspart U-100 100 unit/mL (3 mL) Inpn pen  Commonly known as: NovoLOG  Inject 6-12 Units into the skin daily with dinner or evening meal. Give 6 units if patient eats <50% of meal, give full dose 12 units if eats more than 50% of meal.    Administer subcutaneously with meal. HOLD prandial (mealtime) insulin if patient is NPO,  unable to eat, or if Blood Glucose less than 70 mg/dL.    If patient ate prior to BG check, administer scheduled Novolog only (do not cover with correction dose at this time).  What changed: You were already taking a medication with the same name, and this prescription was added. Make sure you understand how and when to take each.     insulin glargine 100 unit/mL injection  Commonly known as: Lantus  Inject 6 Units into the skin 2 (two) times a day.  What changed:   · how much to take  · when to take this         * This list has 5 medication(s) that are the same as other medications prescribed for you. Read the directions carefully, and ask your doctor or other care provider to review them with you.            CONTINUE taking these medications    amiodarone 200 MG Tab  Commonly known as: PACERONE  Take 1 tablet (200 mg total) by mouth once daily.     atorvastatin 40 MG tablet  Commonly known as: LIPITOR  Take 1 tablet (40 mg total) by mouth once daily.     blood sugar diagnostic Strp  1 strip by Misc.(Non-Drug; Combo Route) route 2 (two) times a day.     cetirizine 10 MG tablet  Commonly known as: ZYRTEC  Take 1 tablet (10 mg total) by mouth every evening.     clopidogreL 75 mg tablet  Commonly known as: PLAVIX  Take 1 tablet (75 mg total) by mouth once daily.     diclofenac sodium 1 % Gel  Commonly known as: VOLTAREN  Apply 4 g topically 3 (three) times daily. Apply to sacrun closed skin/buttocks     ELIQUIS 5 mg Tab  Generic drug: apixaban  Take 1 tablet (5 mg total) by mouth 2 (two) times daily.     ergocalciferol 50,000 unit Cap  Commonly known as:  "ERGOCALCIFEROL  Take 1 capsule (50,000 Units total) by mouth every 7 days.     lacosamide 100 mg Tab  Commonly known as: VIMPAT  Take 1 tablet (100 mg total) by mouth every 12 (twelve) hours.     lancets Misc  Commonly known as: ONETOUCH ULTRASOFT LANCETS  1 lancet by Misc.(Non-Drug; Combo Route) route 2 (two) times a day.     losartan 25 MG tablet  Commonly known as: COZAAR  Take 1 tablet (25 mg total) by mouth once daily.     magnesium oxide 400 mg (241.3 mg magnesium) tablet  Commonly known as: MAG-OX  Take 1 tablet (400 mg total) by mouth 2 (two) times daily.     metoprolol succinate 50 MG 24 hr tablet  Commonly known as: TOPROL-XL  Take 1 tablet (50 mg total) by mouth once daily.     MULTIVITAMIN ORAL  Take 1 tablet by mouth once daily.     nitroGLYCERIN 0.4 MG SL tablet  Commonly known as: NITROSTAT  Place 1 tablet (0.4 mg total) under the tongue every 5 (five) minutes as needed for Chest pain.     pantoprazole 40 MG tablet  Commonly known as: PROTONIX  Take 1 tablet (40 mg total) by mouth once daily.     * pen needle, diabetic 30 gauge x 5/16" Ndle  Commonly known as: PEN NEEDLE  1 Units by Misc.(Non-Drug; Combo Route) route 3 (three) times daily.     * BD ULTRA-FINE SHORT PEN NEEDLE 31 gauge x 5/16" Ndle  Generic drug: pen needle, diabetic  3 (three) times daily.     polycarbophil 625 mg tablet  Commonly known as: FIBERCON  Take 1 tablet by mouth once daily.     senna-docusate 8.6-50 mg 8.6-50 mg per tablet  Commonly known as: PERICOLACE  Take 1 tablet by mouth once daily.     sucralfate 1 gram tablet  Commonly known as: CARAFATE  Take 1 tablet (1 g total) by mouth 3 (three) times daily before meals.         * This list has 2 medication(s) that are the same as other medications prescribed for you. Read the directions carefully, and ask your doctor or other care provider to review them with you.            STOP taking these medications    albuterol 90 mcg/actuation inhaler  Commonly known as: " PROVENTIL/VENTOLIN HFA     aspirin 81 MG EC tablet  Commonly known as: ECOTRIN     diltiaZEM 120 MG Cp24  Commonly known as: CARDIZEM CD     gabapentin 100 MG capsule  Commonly known as: NEURONTIN     metFORMIN 1000 MG tablet  Commonly known as: GLUCOPHAGE     neomycin-bacitracin-polymyxin ointment  Commonly known as: NEOSPORIN          Immunizations Administered as of 5/3/2022     Name Date Dose VIS Date Route Exp Date    COVID-19, MRNA, LN-S, PF (Pfizer) (Purple Cap) 10/29/2021 0.3 mL -- Intramuscular --    Site: Left arm     : Pf[employer]     Lot: [id number]     COVID-19, MRNA, LN-S, PF (Pfizer) (Purple Cap) 1/29/2021 0.3 mL -- Intramuscular --    Site: Left deltoid     : Pf[employer]     Lot: [id number]           Tiffany Awad MD  05/03/2022

## 2022-05-03 NOTE — ASSESSMENT & PLAN NOTE
PT/OT recommends skilled nursing facility for ongoing therapy; patient acknowledges and agrees with the need for ongoing therapy but unfortunately he is required to pay a co-pay for further SNF days.    He feels his only options to go home with limited sitter assistance and home health; his son has made arrangements for him to go to his preferred facility, [facility] but the patient feels he cannot manage the financial implications of going there; patient's son is committed to getting patient to [facility] which is likely his safest disposition as patient needs 24/7 care.  Patient continues to require 24/7 care for safety, ambulation, meal preparation, and medication management. Patient requesting to remain in hospital, refuses NH placement, and does not have adequate 24/7 care in place for safe discharge to home. Family continues to support NH placement at [facility].   Capacity for participation in discharge plan unclear; concern for depression and reluctance to leave his seriously ill wife hospitalized in this hospital.   Consulted Psychiatry for capacity assessment, evaluation of depression.  Patient found to have capacity. Zoloft started for depression. Continues to refuse NH; requires 24/7 assistance for safety and medication / meal management.

## 2022-05-03 NOTE — SUBJECTIVE & OBJECTIVE
Telemedicine  This service was provided by Virtual Visit.    Patient was transferred to [facility] on:  04/22/2022     Chief Complaint   Patient presents with    Altered Mental Status     The patient location is: 8092/8092 A   Admitted 4/11/2022  8:55 PM  Present with the patient at the time of the telemed/virtual assessment: Telepresenter    Interval History / Events Overnight:   The patient is able to provide adequate history. Additional history was obtained from past medical records. No significant events reported by Nursing.    Patient complains of feeling weak. Symptoms have been unchanged since yesterday. Associated symptoms include: fatigue. Symptoms are stable.     Lab test(s) reviewed: hypoglycemia    Review of Systems   Constitutional:  Negative for fever.   Respiratory:  Negative for shortness of breath.    Neurological:  Positive for weakness.     Objective:     Vital Signs (Most Recent):  Temp: 97.8 °F (36.6 °C) (05/03/22 1118)  Pulse: 79 (05/03/22 1118)  Resp: 18 (05/03/22 1118)  BP: (!) 129/58 (05/03/22 1118)  SpO2: 96 % (05/03/22 1118)   Vital Signs (24h Range):  Temp:  [97.7 °F (36.5 °C)-98 °F (36.7 °C)] 97.8 °F (36.6 °C)  Pulse:  [60-79] 79  Resp:  [16-20] 18  SpO2:  [93 %-96 %] 96 %  BP: (114-160)/(57-76) 129/58     Weight: 81.6 kg (179 lb 14.3 oz)  Body mass index is 25.09 kg/m².    Intake/Output Summary (Last 24 hours) at 5/3/2022 1139  Last data filed at 5/3/2022 0921  Gross per 24 hour   Intake 1045 ml   Output --   Net 1045 ml        Physical Exam  Constitutional:       General: He is not in acute distress.     Appearance: Normal appearance.   Eyes:      General: Lids are normal. No scleral icterus.        Right eye: No discharge.         Left eye: No discharge.      Conjunctiva/sclera: Conjunctivae normal.   Neck:      Trachea: Phonation normal.   Cardiovascular:      Rate and Rhythm: Normal rate.      Comments: Monitor / Vital signs reviewed at time of visit  Pulmonary:       Effort: Pulmonary effort is normal. No tachypnea, accessory muscle usage or respiratory distress.   Abdominal:      General: There is no distension.   Skin:     Coloration: Skin is not cyanotic.   Neurological:      Mental Status: He is alert. He is not disoriented.   Psychiatric:         Attention and Perception: Attention normal.         Mood and Affect: Affect normal. Mood is depressed.         Behavior: Behavior is cooperative.       Significant Labs:   Recent Labs   Lab 12/29/21  0810 01/26/22  1512 04/05/22  1138   HGBA1C 12.3* 11.5* 9.7*       Recent Labs   Lab 05/02/22  1943/22  2043 05/03/22  0744   POCTGLUCOSE 44* 116* 265*       Recent Labs   Lab 04/27/22 0459 04/29/22 0335 05/02/22  0553   WBC 6.86 5.46 5.39   HGB 11.2* 11.6* 11.7*   HCT 35.1* 35.5* 36.5*    227 203       Recent Labs   Lab 04/27/22 0459 04/29/22 0335 05/02/22  0553   GRAN 53.1  3.6 49.6  2.7 51.6  2.8   LYMPH 31.5  2.2 31.5  1.7 29.3  1.6   MONO 8.6  0.6 11.4  0.6 9.6  0.5   EOS 0.4 0.4 0.4       Recent Labs   Lab 04/27/22 0459 04/29/22 0335 05/02/22  0553   * 138 138   K 4.0 3.7 4.4    103 102   CO2 27 25 28   BUN 20 17 18   CREATININE 0.8 0.8 0.8   * 40* 215*   CALCIUM 8.4* 8.5* 8.5*   ALBUMIN 2.4* 2.6* 2.6*   MG 1.5* 1.9 1.5*   PHOS 4.1 2.6* 3.0       Recent Labs   Lab 04/27/22 0459 04/29/22 0335 05/02/22  0553   ALKPHOS 97 97 97   ALT 38 54* 43   AST 47* 88* 38   PROT 5.6* 5.8* 6.1   BILITOT 0.5 0.4 0.5       Recent Labs   Lab 02/19/22 2242 02/20/22  0046 03/03/22  2113 03/05/22  1037 03/05/22  1939 03/07/22 2043 04/11/22  2236 04/12/22  0207 04/12/22  0618 04/12/22  1051 04/12/22  1324   PROCAL 0.93*  --   --   --   --   --   --  1.18*  --   --   --    LACTATE  --    < >  --    < > 1.0  --    < > 8.2* 4.2* 3.2* 2.0   DDIMER 0.84*   < > 0.50*  --  0.50* 0.43  --   --   --   --   --    FERRITIN 564*   < > 300  --  354* 334*  --   --   --   --   --    SEDRATE 92*  --   --   --   --    --   --   --   --   --   --     < > = values in this interval not displayed.       Results for orders placed or performed in visit on 05/05/14   Vitamin D   Result Value Ref Range    Vit D, 25-Hydroxy 24 (L) 30 - 96 ng/mL     SARS-CoV2 (COVID-19) Qualitative PCR (no units)   Date Value   03/21/2022 Not Detected   03/17/2022 Not Detected   03/14/2022 Not Detected   03/08/2022 Not Detected     POC Rapid COVID (no units)   Date Value   04/05/2022 Negative   02/17/2022 Positive (A)   01/25/2022 Negative   01/12/2022 Negative   01/09/2022 Negative       ECG Results              EKG 12-lead (Final result)  Result time 04/12/22 12:51:09      Final result by Interface, Lab In [facility] (04/12/22 12:51:09)                   Narrative:    Test Reason : R73.9,    Vent. Rate : 118 BPM     Atrial Rate : 111 BPM     P-R Int : 000 ms          QRS Dur : 162 ms      QT Int : 436 ms       P-R-T Axes : 000 -14 052 degrees     QTc Int : 611 ms    Wide QRS rhythm  Right bundle branch block  ST elevation anterior leads differential includes anterior STEMI vs,  repolarization abnormality  Abnormal ECG  When compared with ECG of 05-APR-2022 11:41,  Wide QRS rhythm has replaced Sinus rhythm  Vent. rate has increased BY  48 BPM  Confirmed by Megha Stock MD ([id number]) on 4/12/2022 12:51:00 PM    Referred By: [email or web address]   SELF           Confirmed By:Megha Stock MD                                    Results for orders placed during the hospital encounter of 01/25/22    Echo    Interpretation Summary  · There is abnormal septal wall motion.  · The left ventricle is normal in size with low normal systolic function.  · The estimated ejection fraction is 50%.  · Normal left ventricular diastolic function.  · Mild left atrial enlargement.  · Normal right ventricular size with normal right ventricular systolic function.  · Mild mitral regurgitation.  · There are segmental left ventricular wall motion abnormalities.  · Mild tricuspid  regurgitation.  · Elevated central venous pressure (15 mmHg).      NM Gastric Emptying  Narrative: EXAMINATION:  NM GASTRIC EMPTYING    CLINICAL HISTORY:  chronic nausea in diabetic;    TECHNIQUE:  The patient received 1.0 mCi orally of sulfur colloid labeled meal in less than 10 minutes.    Anterior and posterior projection images were obtained immediately and at 60 minute intervals for 4 hours.    Geometric mean of the anterior and the posterior images was generated. The decay-corrected time activity curve and the percentage of the retention and emptying were obtained.    COMPARISON:  CT abdomen pelvis 04/13/2022.    FINDINGS:  At 4 hour(s) the percentage of retention is 2 % (normal retention at 4 hours is 10% and lower).  Impression: Normal gastric emptying time.    I, Bam Booth MD, attest that I reviewed and interpreted the images.    Electronically signed by resident: Cisco Alvarado MD  Date:    04/29/2022  Time:    14:09    Electronically signed by: Bam Booth  Date:    04/29/2022  Time:    14:18      Labs and Imaging within the last 24 hours listed above were reviewed.       Diet: Diet diabetic [facility]; 2000 Calorie  Diet diabetic  Significant LDAs:   IV Access Type: Peripheral  Urinary Catheter Indication if present: Patient Does Not Have Urinary Catheter  Other Lines/Tubes/Drains:         Goals of Care:    Previous admission:  4/5/22  Likely prognosis:  Fair  Code Status: DNR  Comfort Only: No  Hospice: No  Goals at discharge: remain at home, with physician follow-up    Discharge Planning   ALLIE: 5/3/2022     Code Status: DNR   Is the patient medically ready for discharge?: Yes    Reason for patient still in hospital (select all that apply): Patient trending condition and Consult recommendations  Discharge Plan A: New Nursing Home placement - shelter care facility   Discharge Delays: None known at this time

## 2022-05-03 NOTE — ASSESSMENT & PLAN NOTE
- DKA now resolved  - Endocrine following and adjusting insulin regimen as needed  - SSI provided for corrective dosing  - Hypoglycemic protocol in effect  -Endocrine following due to erratic glucoses; notified of plan to discharge home in light of re-admits with DKA and eats regular diet at home  -patient needs assistance with 3 discrete meals of consistent carbs per day and insulin management at home.  -Per Endocrinology: the fixed doses of insulin recommended are to cover meals containing 60 g of carbohydrate and it is very important that meals are consistently containing 60 g of carbs.  If patient exceeds 60 g of carbohydrate per meal or consumes any carbohydrates between meals without insulin coverage, this will likely again lead to significant hyperglycemia.

## 2022-05-03 NOTE — PT/OT/SLP PROGRESS
Occupational Therapy   Treatment    Name: Kamar Muñoz  MRN: [id number]  Admitting Diagnosis:  Diabetic ketoacidosis without coma associated with type 2 diabetes mellitus       Recommendations:     Discharge Recommendations: nursing facility, skilled  Discharge Equipment Recommendations:   (TBD)  Barriers to discharge:  Decreased caregiver support    Assessment:     Kamar Muñoz is a 76 y.o. male with a medical diagnosis of Diabetic ketoacidosis without coma associated with type 2 diabetes mellitus.  He presents with deficits in endurance, mobility and self-care tasks. Pt. Agreeable to session on this date and with good participation. Pt. Ambulated in hallway/room  with RW and SBA x ~ 180 feet. Pt. Was able to don a gown like loren seated EOB. Pt too fatigued following mobility to perform there ex. Pt. Would benefit from continued OT services.  Performance deficits affecting function are weakness, impaired endurance, impaired self care skills, impaired functional mobilty.     Rehab Prognosis:  Good; patient would benefit from acute skilled OT services to address these deficits and reach maximum level of function.       Plan:     Patient to be seen 4 x/week to address the above listed problems via self-care/home management, therapeutic activities, therapeutic exercises  · Plan of Care Expires: 05/15/22  · Plan of Care Reviewed with: patient    Subjective   Pt. Reported he would like to get up and move around some.   Pain/Comfort:  · Pain Rating 1: 6/10  · Location 1: back  · Pain Addressed 1: Reposition, Distraction  · Pain Rating Post-Intervention 1: 6/10    Objective:     Communicated with: nurse prior to session.  Patient found right sidelying with  (right sidelying) upon OT entry to room.    General Precautions: Standard, diabetic, fall   Orthopedic Precautions:N/A   Braces: N/A  Respiratory Status: Room air     Occupational Performance:     Bed Mobility:    · Patient completed Supine to Sit with  independence     Functional Mobility/Transfers:  · Patient completed Sit <> Stand Transfer with stand by assistance  with  rolling walker   · Functional Mobility: pt. Ambulated in room as well as hallway  With RW and SBA for a total distance of ~ 180 feet and required 1 standing rest break     Activities of Daily Living:  · Upper Body Dressing: stand by assistance to don gown like loren      [facility] 6 Click ADL: 21    Treatment & Education:  Pt. Educated on need to call for staff assist for mobility    Patient left right sidelying with all lines intact, call button in reach and bed alarm onEducation:      GOALS:   Multidisciplinary Problems     Occupational Therapy Goals        Problem: Occupational Therapy    Goal Priority Disciplines Outcome Interventions   Occupational Therapy Goal     OT, PT/OT Ongoing, Progressing    Description: Goals to be met by: 4/29/2022     Patient will increase functional independence with ADLs by performing:    UE Dressing with Set-up Assistance.  LE Dressing with Minimal Assistance using AD PRN.  Grooming while standing at sink with Stand-by Assistance.  Toileting from toilet with Supervision for hygiene and clothing management.   Step transfer with Supervision using AD PRN.  Toilet transfer to toilet with Stand-by Assistance using AD PRN.                     Time Tracking:     OT Date of Treatment: 05/03/22  OT Start Time: 1432  OT Stop Time: 1446  OT Total Time (min): 14 min    Billable Minutes:Therapeutic Activity 14    OT/JUAN: OT          5/3/2022

## 2022-05-03 NOTE — ASSESSMENT & PLAN NOTE
- DKA now resolved  - Endocrine following and adjusting insulin regimen as needed  - SSI provided for corrective dosing  - Hypoglycemic protocol in effect  -Endocrine following due to erratic glucoses; notified of plan to discharge home in light of re-admits with DKA and eats regular diet at home  -patient needs assistance with 3 discrete meals of consistent carbs per day and insulin management at home.  -Per Endocrinology: the fixed doses of insulin recommended are to cover meals containing 60 g of carbohydrate and it is very important that meals are consistently containing 60 g of carbs.  If patient exceeds 60 g of carbohydrate per meal or consumes any carbohydrates between meals without insulin coverage, this will likely again lead to significant hyperglycemia.    DISCHARGE PLAN:  -  Levemir 6 units BID   -  Aspart 18 units BF (give 9 units if eats <50% and full dose if eats >50%), 18 units L (give 9 units if eats <50% and full dose if eats >50%), 12 units D (give 6 units if eats <50% and full dose if eats >50%), in addition to moderate correction scale with with meals  -Aspart 4 units PRN in place for nightly and between-meal snacks  - Carb Ratio instead of Carb Counting due to dietary indiscretion and variable intake and Boosts with meals  - If BG >350 do not feed patient. Give a correction insulin dose and re-start meal once BG closer to 200 mg/dL

## 2022-05-03 NOTE — PLAN OF CARE
[facility] - Telemetry Stepdown  Discharge Reassessment    Primary Care Provider: Basim Guerrero MD    Expected Discharge Date: 5/3/2022    Reassessment (most recent)     Discharge Reassessment - 05/03/22 1132        Discharge Reassessment    Assessment Type Discharge Planning Reassessment     Discharge Plan discussed with: Patient;Adult children     Communicated ALLIE with patient/caregiver Yes     Discharge Plan A New Nursing Home placement - [facility] care facility     Discharge Plan B Home;Home with family;Home Health   24/7 sitters    DME Needed Upon Discharge  none     Discharge Barriers Identified None        Post-Acute Status    Post-Acute Authorization Placement;Home Health     Post-Acute Placement Status Pending post-acute provider review/more information requested     Home Health Status Discharge Plan Changed     Coverage [employer]     Discharge Delays None known at this time               Pt's case discussed with medical team and  Leadership Team.  [facility] will not accept patient back at this time and also has a waiting list.  Per medical MD, pt will not be able to discharge home without 24/7 sitters. Recommendations for [facility] care.  Pt is medically ready for discharge today.  JASIEL and Supervisor (Corinne) will meet with pt to discuss  discharge plan and express the importance of [facility] placement and accruing expenses if he remains in hospital.      JASIEL spoke to Brunilda boyd/ [facility]  [phone] and confirmed they will be able to admit pt to facility today.  She advised SW pt's son would need to start application for LTC.   JASIEL sent updated clinicals.      JASIEL spoke to Dana with [facility] to see if they would be able to go to the NH and provide HH services for pt.  Dana advised SW they would not be able to go into nursing home and provide HH services for a patient under [facility] care.      Supervisor Corinne and JASIEL met with pt (son was on telephone) to discuss discharge plan.  JASIEL  advised pt [facility] would be unable to take him as they do not have beds at this time.  JASIEL also advised pt he could go to [facility] today but will not be able to receive rehab services.  JASIEL advised pt HH services are not offered in NH for shelter placement.  Pt was initially resistant to going to NH for shelter care and stated he was going to go home.  Corinne and JASIEL further discussed advantages of going to NH facility.  Also, discussed with pt if family has made arrangement to switch his insurance from [employer] to Medicare A & B, he would be able to receive skilled services in the future while at NH.  Pt finally agreed to shelter placement with [facility].  Also, pt's family working with [facility] to complete long term medicaid application.      JASIEL spoke to Kamar again and advised him of the above information.  Brunilda boyd/  [facility] notified pt has agreed to shelter placement.      JASIEL notified pt's nurse he would like to visit with his wife on 9th floor of hospital before discharge.     Awaiting COVID results and Nursing Home orders.       Oliva Lorenzo LMSW  PRN-  [facility]  Ext. [phone]

## 2022-05-03 NOTE — PROGRESS NOTES
[facility] - Telemetry Stepdown  Adult Nutrition  Consult Note    SUMMARY     Recommendations    1. Continue current Diabetic diet + ONS.   2. RD to monitor & follow-up.    Goals: Meet % EEN, EPN by RD f/u date  Nutrition Goal Status: goal met  Communication of RD Recs: reviewed with RN    Assessment and Plan    Severe malnutrition    Nutrition Problem:  Severe Protein-Calorie Malnutrition  Malnutrition in the context of Chronic Illness/Injury     Related to (etiology):  Inability to consume sufficient energy      Signs and Symptoms (as evidenced by):  Energy Intake: <75% of estimated energy requirement for 1-2 months  Body Fat Depletion: moderate and severe depletion of orbitals and triceps   Muscle Mass Depletion: moderate and severe depletion of temples, clavicle region and lower extremities   Weight Loss: 18% x 3-4 months     Interventions(treatment strategy):  Collaboration of nutrition care w/ other providers  ONS     Nutrition Diagnosis Status:  New/Continues     Malnutrition Assessment    Weight Loss (Malnutrition): greater than 7.5% in 3 months  Energy Intake (Malnutrition): less than 75% for greater than or equal to 1 month   Orbital Region (Subcutaneous Fat Loss): moderate depletion  Upper Arm Region (Subcutaneous Fat Loss): severe depletion   [place] Region (Muscle Loss): moderate depletion  Clavicle Bone Region (Muscle Loss): severe depletion  Dorsal Hand (Muscle Loss): moderate depletion  Anterior Thigh Region (Muscle Loss): severe depletion     Reason for Assessment    Reason For Assessment: RD follow-up  Diagnosis: other (see comments) (DKA)  Relevant Medical History: HTN, DM  Interdisciplinary Rounds: did not attend    General Information Comments: Pt scheduled to discharge today. Per RN documentation, pt tolerating diet w/ %. Information obtained from previous RD assessment 4/6: PTA - pt reports decreased appetite x 1-2 months & weight loss x 3-4 months; chart review confirms weight loss  "(weighed 200# x 2022). Pt meets criteria for severe malnutrition. Please see PES statement for details. Diabetic diet education complete .   Nutrition Discharge Planning: Adequate PO intake    Nutrition/Diet History    Spiritual, Cultural Beliefs, Worship Practices, Values that Affect Care: no  Factors Affecting Nutritional Intake: decreased appetite    Anthropometrics    Temp: 97.8 °F (36.6 °C)  Height Method: Stated  Height: 5' 11" (180.3 cm)  Height (inches): 71 in  Weight Method: Bed Scale  Weight: 81.6 kg (179 lb 14.3 oz)  Weight (lb): 179.9 lb  Ideal Body Weight (IBW), Male: 172 lb  % Ideal Body Weight, Male (lb): 104.59 %  BMI (Calculated): 25.1  BMI Grade: 25 - 29.9 - overweight  Usual Body Weight (UBW), k kg  % Usual Body Weight: 89.86  % Weight Change From Usual Weight: -10.33 %    Lab/Procedures/Meds    Pertinent Labs Reviewed: reviewed  Pertinent Labs Comments: A1C 9.7  Pertinent Medications Reviewed: reviewed    Estimated/Assessed Needs    Weight Used For Calorie Calculations: 81.6 kg (179 lb 14.3 oz)     Energy Calorie Requirements (kcal): 1948 kcal/d  Energy Need Method: [place]-St Jeor (1.25 PAL)     Protein Requirements: 98 g/d (1.2 g/kg)  Weight Used For Protein Calculations: 81.6 kg (179 lb 14.3 oz)     Estimated Fluid Requirement Method: other (see comments) (Per MD or 1 mL/kcal)  RDA Method (mL):     Nutrition Prescription Ordered    Current Diet Order: 2000 kcal ADA  Oral Nutrition Supplement: Boost Glucose Control ONS    Evaluation of Received Nutrient/Fluid Intake    I/O: +7.6L since     Comments: LBM: 5/2    Tolerance: tolerating    Nutrition Risk    Level of Risk/Frequency of Follow-up:  (1x/week)     Monitor and Evaluation    Food and Nutrient Intake: energy intake, food and beverage intake  Food and Nutrient Adminstration: diet order  Physical Activity and Function: nutrition-related ADLs and IADLs  Anthropometric Measurements: weight, weight change  Biochemical Data, " Medical Tests and Procedures: inflammatory profile, lipid profile, glucose/endocrine profile, gastrointestinal profile, electrolyte and renal panel  Nutrition-Focused Physical Findings: overall appearance     Nutrition Follow-Up    RD Follow-up?: Yes

## 2022-05-03 NOTE — NURSING
Patient being discharge. Removed IV access. Discharge instruction reviewed. Cell phone in possession.  Personal belongings given to Van transportation. [email or web address].

## 2022-05-03 NOTE — PLAN OF CARE
Pt will admit to [facility] today. JASIEL notified pt's son Edward of discharge.      JASIEL scheduled d/c transportation to [facility]. Patient is scheduled to be picked up at 4:30p.m.. JASIEL provided patient's nurse with report number [phone]; ask for the nurse for [facility] [address].   JASIEL is in communication with patient's CM and patient's Care Team.  Pt will transport via wheelchair/room air.      05/03/22 1546   Post-Acute Status   Post-Acute Authorization Placement;Home Health   Post-Acute Placement Status Set-up Complete/Auth obtained   Home Health Status Discharge Plan Changed   Discharge Delays None known at this time   Discharge Plan   Discharge Plan A [facility]   Discharge Plan B Home;Home with family;Home Health     Oliva Lorenzo LMSW  PRN-  [facility]  Ext. [phone]

## 2022-05-03 NOTE — ASSESSMENT & PLAN NOTE
Key History and Diagnostic Findings  - Admit for recurrent DKA  - A1c of 9.7 on 2022 from 11.5 on 2022    - Home Regimen: Levemir 8 units daily, aspart 8 units TIDWM   - Weight based dosin kg x 0.5 = 41 TDD x 0.5 = 20 basal / 20 prandial   - 1700/TDD = 41 (estimated insulin sensitivity factor)   - 450/TDD = 11 (estimated starting carb ratio for prandial dosing)    -  Glucose Goals: 140-180mg/dL  -  24 hour glucose trend: 40's to 200's  -  Variable diet %, inconsistent carbs with meals, diet indiscretions, intermittent nausea, variable diet/po intake  -  Boosts with meals  -  S/p GES normal    Plan  -  Levemir 6 units BID (fasting hypoglycemia with 8 units bid)  -  Aspart 18 units BF (please give 9 units if eats <50% and full dose if eats >50%), 18 units L (please give 9 units if eats <50% and full dose if eats >50%), 12 units D (please give 6 units if eats <50% and full dose if eats >50%), in addition to moderate correction with with meals  -Aspart 4 units PRN in place for nightly and in between meal snacks  -  Would likely better benefit from Carb Ratio instead with carb counting due to dietary indiscretion and variable intake and boosts with meals  -  Prandial insulin with range resulted in large fluctuations of global hyperglycemia and hypoglycemia, overcorrections  -  Moderate correction scale    -If BG >350 do not feed pt. Please give a correction and re-start meal once BG closer to 200    For DC : can consider sending on above regimen     Reiterated with patient that the fixed doses of insulin we have recommended are to cover meals containing 60 g of carbohydrate and it is very important that meals are consistently containing 60 g of carbs.  If patient exceed 60 g of carbohydrate per meal or consumes any carbohydrates between meals without insulin coverage this will likely again lead to significant hyperglycemia. In addition if he eats <50 percent of meal to only give half the  recommended pre-meal dose.

## 2022-05-03 NOTE — PROGRESS NOTES
[facility] - Telemetry Stepdown  Endocrinology  Progress Note    Admit Date: 4/11/2022     76 year old  male with T2DM, HTN, CAD, STEMI, HLD, paroxysmal Afib, CVA, seizures who presents for recurrent DKA.  He [place] to [facility] [place] on 4[place]11 with elevated blood glucose.  He was discharged from [facility] on 4/10 after being admitted for DKA on 4/5. Per family he was not feeling well after discharge to home and vomited several times on 4/11. Finger stick at home read High with unreadable blood glucose.  At this time daughter gave him 14 units of insulin and sublingual zofran. Other than malaise and nausea patient was not exhibiting any other signs/symptoms at home.    - In ER BG found to be 1015 with pCO2 6 and anion gap 35.  Hyperkalemic with K 7.0 and some EKG changes when compared to previous EKG.  Given calcium gluconate, and started on insulin gtt as well as subQ long acting insulin     - Endocrinology consulted for management of type 2 diabetes after resolution of DKA    - Spoke with daughter who states patient was discharged on 04/10/2022 with high glucose in the 400s which worsened after getting home and eating dinner; appropriate mealtime and correction insulin were given at that time. However, the next day patient's condition was worsening and they called EMS and his sugar was found to be in the thousands. She expresses concern that he cannot be care for adequately at home any longer and wishes to pursue skilled nursing facility.    Regarding Diabetes Mellitus:    Recurring episodes of DKA  Surgical Procedure and Date: NA  Diabetes diagnosis year: >20 years  Home Diabetes Medications:  During recent SNF was on Aspart 8 TID and glargine 8 units daily with sliding scale  How often checking glucose at home? >4 x day   Diabetes Complications include: Hyperglycemia, Hypoglycemia , and Diabetic peripheral neuropathy   Complicating diabetes co morbidities: History of CVA      Interval HPI:   Overnight events:    BG  "above goal in am  With low BG episode yesterday as pt did not eat his food entirely    Eatin%  Nausea: No  Hypoglycemia and intervention: Yes  Fever: No  TPN and/or TF: No  If yes, type of TF/TPN and rate: NA    BP (!) 158/76 (BP Location: Left arm, Patient Position: Lying)   Pulse 60   Temp 98 °F (36.7 °C) (Oral)   Resp 20   Ht 5' 11" (1.803 m)   Wt 81.6 kg (179 lb 14.3 oz)   SpO2 96%   BMI 25.09 kg/m²     Labs Reviewed and Include    No results for input(s): GLU, CALCIUM, ALBUMIN, PROT, NA, K, CO2, CL, BUN, CREATININE, ALKPHOS, ALT, AST, BILITOT in the last 24 hours.  Lab Results   Component Value Date    WBC 5.39 2022    HGB 11.7 (L) 2022    HCT 36.5 (L) 2022    MCV 92 2022     2022     No results for input(s): TSH, FREET4 in the last 168 hours.  Lab Results   Component Value Date    HGBA1C 9.7 (H) 2022       Nutritional status:   Body mass index is 25.09 kg/m².  Lab Results   Component Value Date    ALBUMIN 2.6 (L) 2022    ALBUMIN 2.6 (L) 2022    ALBUMIN 2.4 (L) 2022     No results found for: PREALBUMIN    Estimated Creatinine Clearance: 81.1 mL/min (based on SCr of 0.8 mg/dL).    Accu-Checks  Recent Labs     22  1143 22  1540 22  2036 22  0504 22  0804 22  1154 22  1929 22  1943 22  2043 22  0744   POCTGLUCOSE 193* 249* 279* 192* 273* 283* 44* 44* 116* 265*       Current Medications and/or Treatments Impacting Glycemic Control  Immunotherapy:    Immunosuppressants       None          Steroids:   Hormones (From admission, onward)                Start     Stop Route Frequency Ordered    22 1047  melatonin tablet 6 mg  (Medication Panel)         -- Oral Nightly PRN 22 0948          Pressors:    Autonomic Drugs (From admission, onward)                None          Hyperglycemia/Diabetes Medications:   Antihyperglycemics (From admission, onward)                Start     " Stop Route Frequency Ordered    22 1645  insulin aspart U-100 pen 6-12 Units         -- SubQ with dinner 22 1130  insulin aspart U-100 pen 9-18 Units         -- SubQ with lunch 22 0715  insulin aspart U-100 pen 9-18 Units         -- SubQ with breakfast 22 1000  insulin detemir U-100 pen 6 Units         -- SubQ 2 times daily 22 0945    22 0931  insulin aspart U-100 pen 1-10 Units         -- SubQ Before meals & nightly PRN 22 0832    22 0111  insulin aspart U-100 pen 4 Units         -- SubQ With snacks 22 0012            ASSESSMENT and PLAN    Nausea  Numerous episodes of nausea (and vomiting at home) during this admit and with prior admits  -  Attempted medical therapy with PPI, carafate, seizure hx no reglan, scheduled zofran  -  Primary suspects is multifactorial with GERD and poor gastric emptying suspected  -  GI with GES normal    Dyslipidemia associated with type 2 diabetes mellitus  - on atorvastatin 40 mg daily      Ketosis-prone diabetes mellitus  Key History and Diagnostic Findings  - Admit for recurrent DKA  - A1c of 9.7 on 2022 from 11.5 on 2022    - Home Regimen: Levemir 8 units daily, aspart 8 units TIDWM   - Weight based dosin kg x 0.5 = 41 TDD x 0.5 = 20 basal / 20 prandial   - 1700/TDD = 41 (estimated insulin sensitivity factor)   - 450/TDD = 11 (estimated starting carb ratio for prandial dosing)    -  Glucose Goals: 140-180mg/dL  -  24 hour glucose trend: 40's to 200's  -  Variable diet %, inconsistent carbs with meals, diet indiscretions, intermittent nausea, variable diet/po intake  -  Boosts with meals  -  S/p GES normal    Plan  -  Levemir 6 units BID (fasting hypoglycemia with 8 units bid)  -  Aspart 18 units BF (please give 9 units if eats <50% and full dose if eats >50%), 18 units L (please give 9 units if eats <50% and full dose if eats >50%), 12 units D (please  give 6 units if eats <50% and full dose if eats >50%), in addition to moderate correction with with meals  -Aspart 4 units PRN in place for nightly and in between meal snacks  -  Would likely better benefit from Carb Ratio instead with carb counting due to dietary indiscretion and variable intake and boosts with meals  -  Prandial insulin with range resulted in large fluctuations of global hyperglycemia and hypoglycemia, overcorrections  -  Moderate correction scale    -If BG >350 do not feed pt. Please give a correction and re-start meal once BG closer to 200    For DC : can consider sending on above regimen     Reiterated with patient that the fixed doses of insulin we have recommended are to cover meals containing 60 g of carbohydrate and it is very important that meals are consistently containing 60 g of carbs.  If patient exceed 60 g of carbohydrate per meal or consumes any carbohydrates between meals without insulin coverage this will likely again lead to significant hyperglycemia. In addition if he eats <50 percent of meal to only give half the recommended pre-meal dose.         Coronary artery disease  - Strive to optimize glucose control        Rajeev Sanderson MD  Endocrinology  [facility] - Telemetry Stepdown

## 2022-05-03 NOTE — PT/OT/SLP PROGRESS
Physical Therapy      Patient Name:  Kamar Muñoz   MRN:  [id number]    Patient not seen today secondary to  (Patient discharging today to NH. If patient remains in hospital will follow up). Will follow-up next day if patient does not discharge as scheduled. .         Yes

## 2022-05-03 NOTE — ASSESSMENT & PLAN NOTE
Numerous episodes of nausea (and vomiting at home) during this admit and with prior admits  -no history of peptic ulcer disease per the patient but definite reflux symptoms  -therapy with pantoprazole alone ineffective; symptoms persist with addition of Carafate.  -suspect is multifactorial with GERD and poor gastric emptying suspected  -he is not a candidate for Reglan due to history of seizure  -currently requiring scheduled Zofran with meals to improve symptoms  -he is having regular bowel movements which are soft  -discussed with GI and proceeded with GES which was normal  -appears stable with Zofran and carafate

## 2022-05-03 NOTE — ASSESSMENT & PLAN NOTE
- Stable  - Continue home regimen of atorvastatin, clopidogrel, losartan, and metoprolol  - ASA discontinued 4/13 due to bleeding risk with triple therapy; continuing Plavix and Eliquis

## 2022-05-03 NOTE — NURSING
Attempt to call report to Ms Trammell the nurse at [facility] unsuccessful but left message with name/ phone #.

## 2022-05-03 NOTE — SUBJECTIVE & OBJECTIVE
"Interval HPI:   Overnight events:    BG above goal in am  With low BG episode yesterday as pt did not eat his food entirely    Eatin%  Nausea: No  Hypoglycemia and intervention: Yes  Fever: No  TPN and/or TF: No  If yes, type of TF/TPN and rate: NA    BP (!) 158/76 (BP Location: Left arm, Patient Position: Lying)   Pulse 60   Temp 98 °F (36.7 °C) (Oral)   Resp 20   Ht 5' 11" (1.803 m)   Wt 81.6 kg (179 lb 14.3 oz)   SpO2 96%   BMI 25.09 kg/m²     Labs Reviewed and Include    No results for input(s): GLU, CALCIUM, ALBUMIN, PROT, NA, K, CO2, CL, BUN, CREATININE, ALKPHOS, ALT, AST, BILITOT in the last 24 hours.  Lab Results   Component Value Date    WBC 5.39 2022    HGB 11.7 (L) 2022    HCT 36.5 (L) 2022    MCV 92 2022     2022     No results for input(s): TSH, FREET4 in the last 168 hours.  Lab Results   Component Value Date    HGBA1C 9.7 (H) 2022       Nutritional status:   Body mass index is 25.09 kg/m².  Lab Results   Component Value Date    ALBUMIN 2.6 (L) 2022    ALBUMIN 2.6 (L) 2022    ALBUMIN 2.4 (L) 2022     No results found for: PREALBUMIN    Estimated Creatinine Clearance: 81.1 mL/min (based on SCr of 0.8 mg/dL).    Accu-Checks  Recent Labs     22  1143 22  1540 22  2036 22  0504 22  0804 22  1154 22  1929 22  1943 22  2043 22  0744   POCTGLUCOSE 193* 249* 279* 192* 273* 283* 44* 44* 116* 265*       Current Medications and/or Treatments Impacting Glycemic Control  Immunotherapy:    Immunosuppressants       None          Steroids:   Hormones (From admission, onward)                Start     Stop Route Frequency Ordered    22 1047  melatonin tablet 6 mg  (Medication Panel)         -- Oral Nightly PRN 22 0948          Pressors:    Autonomic Drugs (From admission, onward)                None          Hyperglycemia/Diabetes Medications:   Antihyperglycemics (From " admission, onward)                Start     Stop Route Frequency Ordered    05/03/22 1645  insulin aspart U-100 pen 6-12 Units         -- SubQ with dinner 05/02/22 2001 05/03/22 1130  insulin aspart U-100 pen 9-18 Units         -- SubQ with lunch 05/02/22 2001 05/03/22 0715  insulin aspart U-100 pen 9-18 Units         -- SubQ with breakfast 05/02/22 2001 04/30/22 1000  insulin detemir U-100 pen 6 Units         -- SubQ 2 times daily 04/30/22 0945    04/25/22 0931  insulin aspart U-100 pen 1-10 Units         -- SubQ Before meals & nightly PRN 04/25/22 0832    04/17/22 0111  insulin aspart U-100 pen 4 Units         -- SubQ With snacks 04/17/22 0012

## 2022-05-03 NOTE — DISCHARGE SUMMARY
[facility] - Telemetry [facility] Medicine  Telemedicine Discharge Summary      Patient Name: Kamar Muñoz  MRN: [id number]  Patient Class: IP- Inpatient  Admission Date: 4/11/2022  Hospital Length of Stay: 22 days  Discharge Date and Time:  05/03/2022 1:45 PM  Attending Physician: Tiffany Awad MD   Discharging Provider: Tiffany Awad MD  Primary Care Provider: Basim Guerrero MD      HPI:   Mr. Kamar Muñoz is a 76 y.o. male with a past medical history of HTN, Type 2 DM, CAD, STEMI, HLD, paroxysmal Afib, CVA, seizures and recurrent DKA.  [place] [facility] [place] with elevated blood glucose.  [place] [facility] [place] after being admitted for DKA on 4/5.  At time of exam patient is alert but altered and unable to provide any history.  Spoke with patient's daughter who states she is a primary caregiver at home and obtained history as well as review of chart.  Per family he was not feeling well after discharge to home and vomited several times on 4/11. Unknown characteristics of emesis. Finger stick at home read High with unreadable blood glucose.  At this time daughter gave him 14 units of insulin and sublingual zofran. Other than malaise and nausea patient was not exhibiting any other signs/symptoms at home.  In ER BG found to be 1015 with pCO2 6 and anion gap 35.  Hyperkalemic with K 7.0 and some EKG changes when compared to previous EKG.  Given calcium gluconate, and started on insulin gtt as well as subQ long acting insulin.      Critical Care Medicine consulted for DKA and admitted to MICU.        * No surgery found *      Hospital Course:   Admitted to MICU on 4/11 for DKA with BG >1000, pCO2 6, AG 35, and hyperkalemia.  Given Calcium gluconate and started on insulin gtt in ED.  Hyperkalemia not improved on repeat labs and bolused with IV insulin.  Infectious workup sent and broad spectrum abx started.  4/12 potassium improving with insulin. 4/13 Gap closed, insulin  gtt turned off and switched to subq insulin. Endocrine consulted for insulin management. He was stepped down to  4/14.  Since step down, hemodynamically stable. Advanced diet. Endocrine following and titrating insulin. Poor po intake making it difficult to adjust insulin. CT chest with cavitary lesion, pulmonary and ID consulted. SNF planned once medically stable; however, no remaining SNF benefits.     See problems listed below for additional details    Goals of Care Treatment Preferences:  Code Status: DNR      Consults:   Consults (From admission, onward)        Status Ordering Provider     Inpatient consult to Psychiatry  Once        Provider:  (Not yet assigned)    Completed DAMON ZIMMERMAN     Inpatient virtual consult to Hospital Medicine  Once        Provider:  (Not yet assigned)    Completed DEMETRIA SMITH     Inpatient consult to Pulmonology  Once        Provider:  (Not yet assigned)    Completed ROSHAN SHELLEY     Inpatient consult to Registered Dietitian/Nutritionist  Once        Provider:  (Not yet assigned)    Completed DAPHNEY ROSHAN     Inpatient consult to Registered Dietitian/Nutritionist  Once        Provider:  (Not yet assigned)    Completed DAPHNEY ROSHAN     Inpatient virtual consult to Hospital Medicine  Once        Provider:  (Not yet assigned)    Completed DAPHNEY, ROSHAN     Inpatient virtual consult to Hospital Medicine  Once        Provider:  (Not yet assigned)    Completed DAPHNEY ROSHAN     Inpatient consult to Endocrinology  Once        Provider:  (Not yet assigned)    Completed SHARON CAMPBELL     Inpatient consult to Registered Dietitian/Nutritionist  Once        Provider:  (Not yet assigned)    Completed SHARON CAMPBELL     Inpatient consult to Critical Care Medicine  Once        Provider:  (Not yet assigned)    Completed MALU MOURA          Nausea  Numerous episodes of nausea (and vomiting at home) during this admit and with prior admits  -no  "history of peptic ulcer disease per the patient but definite reflux symptoms  -therapy with pantoprazole alone ineffective; symptoms persist with addition of Carafate.  -suspect is multifactorial with GERD and poor gastric emptying suspected  -he is not a candidate for Reglan due to history of seizure  -currently requiring scheduled Zofran with meals to improve symptoms  -he is having regular bowel movements which are soft  -discussed with GI and proceeded with GES which was normal  -appears stable with Zofran and carafate    Dyslipidemia associated with type 2 diabetes mellitus  - Continuing current management with statin    Bacterial pneumonia  Completed 7 days of Augmentin    Pulmonary cavitary lesion  - As per CT, noted about a month ago  - Repeat CT Chest without contrast obtained on 4/19 revealed "an evolving appearance of a right upper lobe posterior segment cavitary lesion with internal dependent mass; the cavity demonstrates a thinner wall and has decreased in diameter.  There is adjacent contracture of parenchyma.  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  The central debris is decreased in size,, now measuring 1.0 cm (axial series 4, image 81), previously 2.0 cm, and various other nodules".   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  - 1/11/2022 Culture with MAC pending susceptibilities   - 1/11/2022 2nd AFB culture with pending results  - Differential includes MAC, aspergillosis, chronic aspiration and less likely malignancy   - Pulmonary rec Augmentin x 7 days   - Induced sputum and send for cultures ordered, and AFB - has not been able to supply sample  - ID consulted and recommend repeat AFB sputum  - Poor candidate for surgical intervention given he is on triple therapy (eliquis + DAPT) for both afib and recent STEMI with LAUREN stent placed in January  - suspect his weight loss and recent decline worsened by underlying infection  - " Repeat Chest CT in 3 months - 7/2022   - No indication for bronchoscopy at this time  - Can continue follow up with Dr. Louie outpatient.   - On RA and denies respiratory symptoms    Goals of care, counseling/discussion  - DNR as per ICU discussion with patient and family    Functional urinary incontinence  Remains requiring adult diapers  Post-void residual ordered to assess for retention  Initiate timed toileting for bladder training  Urology referral placed    Severe malnutrition  Nutrition consulted. Most recent weight and BMI monitored- Body mass index is 25.09 kg/m².      -he is drinking more Boost than eating food so increased Boost supplements to 2 servings with each tray.     Measurements:  Wt Readings from Last 1 Encounters:   04/27/22 81.6 kg (179 lb 14.3 oz)   Body mass index is 25.09 kg/m².    Recommendations: Recommendation/Intervention: 1. Continue current Diabetic diet + ONS. 2. RD to monitor & follow-up.  Goals: Meet % EEN, EPN by RD f/u date    Patient has been screened and assessed by RD. RD will follow patient.    Discharge planning issues  PT/OT recommends skilled nursing facility for ongoing therapy; patient acknowledges and agrees with the need for ongoing therapy but unfortunately he is required to pay a co-pay for further SNF days.    He feels his only options to go home with limited sitter assistance and home health; his son has made arrangements for him to go to his preferred facility, [facility] but the patient feels he cannot manage the financial implications of going there; patient's son is committed to getting patient to [facility] which is likely his safest disposition as patient needs 24/7 care.  Patient continues to require 24/7 care for safety, ambulation, meal preparation, and medication management. Patient requesting to remain in hospital, refuses NH placement, and does not have adequate 24/7 care in place for safe discharge to home. Family continues to  support NH placement at [facility].   Capacity for participation in discharge plan unclear; concern for depression and reluctance to leave his seriously ill wife hospitalized in this hospital.   Consulted Psychiatry for capacity assessment, evaluation of depression.  Patient found to have capacity. Zoloft started for depression. Continues to refuse NH; requires 24/7 assistance for safety and medication / meal management.    Ketosis-prone diabetes mellitus  - DKA now resolved  - Endocrine following and adjusting insulin regimen as needed  - SSI provided for corrective dosing  - Hypoglycemic protocol in effect  -Endocrine following due to erratic glucoses; notified of plan to discharge home in light of re-admits with DKA and eats regular diet at home  -patient needs assistance with 3 discrete meals of consistent carbs per day and insulin management at home.  -Per Endocrinology: the fixed doses of insulin recommended are to cover meals containing 60 g of carbohydrate and it is very important that meals are consistently containing 60 g of carbs.  If patient exceeds 60 g of carbohydrate per meal or consumes any carbohydrates between meals without insulin coverage, this will likely again lead to significant hyperglycemia.    DISCHARGE PLAN:  -  Levemir 6 units BID   -  Aspart 18 units BF (give 9 units if eats <50% and full dose if eats >50%), 18 units L (give 9 units if eats <50% and full dose if eats >50%), 12 units D (give 6 units if eats <50% and full dose if eats >50%), in addition to moderate correction scale with with meals  -Aspart 4 units PRN in place for nightly and between-meal snacks  - Carb Ratio instead of Carb Counting due to dietary indiscretion and variable intake and Boosts with meals  - If BG >350 do not feed patient. Give a correction insulin dose and re-start meal once BG closer to 200 mg/dL    Focal seizures  - History of seizures treated with lacosamide.    - Continue home lacosamide    Coronary  artery disease  - Stable  - Continue home regimen of atorvastatin, clopidogrel, losartan, and metoprolol  - ASA discontinued 4/13 due to bleeding risk with triple therapy; continuing Plavix and Eliquis    Paroxysmal atrial fibrillation  - History of A-fib.    - Continue home regimen of amiodarone, apixaban, and metoprolol    History of embolic stroke  Continuing current management with apixaban    Essential hypertension  - resume home meds as tolerated  - holding Cozaar initially; resumed 5/2      Final Active Diagnoses:    Diagnosis Date Noted POA    At high risk for skin breakdown [Z91.89] 05/02/2022 Yes    Nausea [R11.0] 04/26/2022 Yes    Dyslipidemia associated with type 2 diabetes mellitus [E11.69, E78.5] 04/23/2022 Yes    Bacterial pneumonia [J15.9] 04/22/2022 Yes    Pulmonary cavitary lesion [J98.4] 04/20/2022 Yes    Goals of care, counseling/discussion [Z71.89] 04/12/2022 Not Applicable    Functional urinary incontinence [R39.81] 04/10/2022 Yes    Severe malnutrition [E43] 04/06/2022 Yes    Discharge planning issues [Z02.9] 03/02/2022 Not Applicable    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] 01/30/2022 Yes    Focal seizures [R56.9] 01/26/2022 Yes     Chronic    Coronary artery disease [I25.10] 01/19/2022 Yes    Stented coronary artery [Z95.5] 01/19/2022 Not Applicable    Paroxysmal atrial fibrillation [I48.0] 01/12/2022 Yes     Chronic    History of embolic stroke [Z86.73] 01/12/2022 Not Applicable    Essential hypertension [I10] 07/20/2012 Yes     Chronic      Problems Resolved During this Admission:    Diagnosis Date Noted Date Resolved POA    PRINCIPAL PROBLEM:  Diabetic ketoacidosis without coma associated with type 2 diabetes mellitus [E11.10]  04/20/2022 Yes    Encephalopathy [G93.40] 04/12/2022 04/13/2022 Yes    Lactic acidosis [E87.2] 04/12/2022 04/21/2022 Yes    Hyperkalemia [E87.5] 01/25/2022 04/13/2022 Yes    BRENDAN (acute kidney injury) [N17.9] 08/30/2016 04/21/2022 Yes        Discharged Condition: stable    [facility]    Follow Up:   Follow-up Information     [facility] ENDOCRINOLOGY. Schedule an appointment as soon as possible for a visit in 1 week(s).    Specialty: Endocrinology  Why: For discharge from hospital follow up, To establish care  Contact information:  [address]  [facility] [address]  [phone]           [facility] UROLOGY. Schedule an appointment as soon as possible for a visit.    Specialty: Urology  Why: To establish care  Contact information:  [address]  [facility] [address]  [phone]           Zhang Louie MD. Schedule an appointment as soon as possible for a visit.    Specialty: Pulmonary Disease  Why: As previously planned  Contact information:  [address]  [address]  [facility] [address]  [phone]                       Future Appointments   Date Time Provider Department Center   5/5/2022  5:00 PM Basim Guerrero MD [facility]   5/16/2022  2:00 PM DIABETES DISCHARGE CLINIC [facility] Stone Santos       Patient Instructions:      Ambulatory referral/consult to Outpatient Case Management   Referral Priority: Routine Referral Type: Consultation   Referral Reason: Specialty Services Required   Number of Visits Requested: 1     Ambulatory referral/consult to Endocrinology   Standing Status: Future   Referral Priority: Routine Referral Type: Consultation   Requested Specialty: Endocrinology   Number of Visits Requested: 1     Ambulatory referral/consult to Urology   Standing Status: Future   Referral Priority: Routine Referral Type: Consultation   Referral Reason: Specialty Services Required   Requested Specialty: Urology   Number of Visits Requested: 1     Diet diabetic   Order Comments: - CONSISTENT 60 GM CARBOHYDRATE PER MEAL  -If BG >350 do not feed pt. Please give a correction and re-start meal once BG closer to 200     Notify your health  care provider if you experience any of the following:  temperature >100.4     Notify your health care provider if you experience any of the following:  persistent nausea and vomiting or diarrhea     Notify your health care provider if you experience any of the following:  difficulty breathing or increased cough     Notify your health care provider if you experience any of the following:  severe persistent headache     Notify your health care provider if you experience any of the following:  persistent dizziness, light-headedness, or visual disturbances     Notify your health care provider if you experience any of the following:  increased confusion or weakness     Activity as tolerated   Order Comments: SCHEDULED WALKS DAILY       Significant Diagnostic Studies:     Recent Labs   Lab 12/29/21  0810 01/26/22  1512 04/05/22  1138   HGBA1C 12.3* 11.5* 9.7*     Recent Labs   Lab 04/27/22 0459 04/29/22 0335 05/02/22  0553   WBC 6.86 5.46 5.39   HGB 11.2* 11.6* 11.7*   HCT 35.1* 35.5* 36.5*    227 203     Recent Labs   Lab 04/27/22 0459 04/29/22 0335 05/02/22  0553   GRAN 53.1  3.6 49.6  2.7 51.6  2.8   LYMPH 31.5  2.2 31.5  1.7 29.3  1.6   MONO 8.6  0.6 11.4  0.6 9.6  0.5   EOS 0.4 0.4 0.4     Recent Labs   Lab 04/27/22 0459 04/29/22 0335 05/02/22  0553   * 138 138   K 4.0 3.7 4.4    103 102   CO2 27 25 28   BUN 20 17 18   CREATININE 0.8 0.8 0.8   * 40* 215*   CALCIUM 8.4* 8.5* 8.5*   ALBUMIN 2.4* 2.6* 2.6*   MG 1.5* 1.9 1.5*   PHOS 4.1 2.6* 3.0     Recent Labs   Lab 04/27/22 0459 04/29/22 0335 05/02/22  0553   ALKPHOS 97 97 97   ALT 38 54* 43   AST 47* 88* 38   PROT 5.6* 5.8* 6.1   BILITOT 0.5 0.4 0.5     Recent Labs   Lab 02/19/22  2242 02/20/22  0046 03/03/22  2113 03/05/22  1037 03/05/22  1939 03/07/22  2043 04/11/22  2236 04/12/22  0207 04/12/22  0618 04/12/22  1051 04/12/22  1324   PROCAL 0.93*  --   --   --   --   --   --  1.18*  --   --   --    LACTATE  --    < >  --     < > 1.0  --    < > 8.2* 4.2* 3.2* 2.0   DDIMER 0.84*   < > 0.50*  --  0.50* 0.43  --   --   --   --   --    FERRITIN 564*   < > 300  --  354* 334*  --   --   --   --   --    SEDRATE 92*  --   --   --   --   --   --   --   --   --   --     < > = values in this interval not displayed.     SARS-CoV2 (COVID-19) Qualitative PCR (no units)   Date Value   05/03/2022 Not Detected   03/21/2022 Not Detected   03/17/2022 Not Detected   03/14/2022 Not Detected   03/08/2022 Not Detected     POC Rapid COVID (no units)   Date Value   04/05/2022 Negative   02/17/2022 Positive (A)   01/25/2022 Negative   01/12/2022 Negative   01/09/2022 Negative       ECG Results          EKG 12-lead (Final result)  Result time 04/12/22 12:51:09    Final result by Interface, Lab In [facility] (04/12/22 12:51:09)                 Narrative:    Test Reason : R73.9,    Vent. Rate : 118 BPM     Atrial Rate : 111 BPM     P-R Int : 000 ms          QRS Dur : 162 ms      QT Int : 436 ms       P-R-T Axes : 000 -14 052 degrees     QTc Int : 611 ms    Wide QRS rhythm  Right bundle branch block  ST elevation anterior leads differential includes anterior STEMI vs,  repolarization abnormality  Abnormal ECG  When compared with ECG of 05-APR-2022 11:41,  Wide QRS rhythm has replaced Sinus rhythm  Vent. rate has increased BY  48 BPM  Confirmed by Megha Stock MD ([id number]) on 4/12/2022 12:51:00 PM    Referred By: [email or web address]   SELF           Confirmed By:Megha Stock MD                              Results for orders placed during the hospital encounter of 01/25/22    Echo    Interpretation Summary  · There is abnormal septal wall motion.  · The left ventricle is normal in size with low normal systolic function.  · The estimated ejection fraction is 50%.  · Normal left ventricular diastolic function.  · Mild left atrial enlargement.  · Normal right ventricular size with normal right ventricular systolic function.  · Mild mitral regurgitation.  · There are segmental  left ventricular wall motion abnormalities.  · Mild tricuspid regurgitation.  · Elevated central venous pressure (15 mmHg).      NM Gastric Emptying  Narrative: EXAMINATION:  NM GASTRIC EMPTYING    CLINICAL HISTORY:  chronic nausea in diabetic;    TECHNIQUE:  The patient received 1.0 mCi orally of sulfur colloid labeled meal in less than 10 minutes.    Anterior and posterior projection images were obtained immediately and at 60 minute intervals for 4 hours.    Geometric mean of the anterior and the posterior images was generated. The decay-corrected time activity curve and the percentage of the retention and emptying were obtained.    COMPARISON:  CT abdomen pelvis 04/13/2022.    FINDINGS:  At 4 hour(s) the percentage of retention is 2 % (normal retention at 4 hours is 10% and lower).  Impression: Normal gastric emptying time.    I, Bam Booth MD, attest that I reviewed and interpreted the images.    Electronically signed by resident: Cisco Alvarado MD  Date:    04/29/2022  Time:    14:09    Electronically signed by: Bam Booth  Date:    04/29/2022  Time:    14:18       Medications:  Reconciled Home Medications:      Medication List      START taking these medications    DEXCOM G5  Misc  Generic drug: blood-glucose meter,continuous  1 Device by Misc.(Non-Drug; Combo Route) route once daily at 6am.     DEXCOM G5 TRANSMITTER Stephanie  Generic drug: blood-glucose transmitter  1 Device by Misc.(Non-Drug; Combo Route) route once daily at 6am.     * glucose 4 GM chewable tablet  Take 4 tablets (16 g total) by mouth as needed for Low blood sugar (50 - 70).     * glucose 4 GM chewable tablet  Take 6 tablets (24 g total) by mouth as needed for Low blood sugar (< 50).     ondansetron 4 MG Tbdl  Commonly known as: ZOFRAN-ODT  Take 1 tablet (4 mg total) by mouth 3 (three) times daily with meals.     sertraline 25 MG tablet  Commonly known as: ZOLOFT  Take 1 tablet (25 mg total) by mouth once daily.     tamsulosin 0.4 mg  Cap  Commonly known as: FLOMAX  Take 1 capsule (0.4 mg total) by mouth once daily.         * This list has 2 medication(s) that are the same as other medications prescribed for you. Read the directions carefully, and ask your doctor or other care provider to review them with you.            CHANGE how you take these medications    * insulin aspart U-100 100 unit/mL (3 mL) Inpn pen  Commonly known as: NovoLOG  Inject 4 Units into the skin with snacks (>200).  What changed:   · how much to take  · how to take this  · when to take this  · reasons to take this  · additional instructions     * insulin aspart U-100 100 unit/mL (3 mL) Inpn pen  Commonly known as: NovoLOG  Inject 1-10 Units into the skin before meals and at bedtime as needed (Hyperglycemia). **MODERATE CORRECTION DOSE**  Blood Glucose  mg/dL                  Pre-meal                2200  151-200                2 units                    1 unit  201-250                4 units                    3 units    251-300                6 units                    4 units    301-350                8 units                    5 units   >350                     10 units                  6 units  Administer subcutaneously if needed at times designated by monitoring schedule.  What changed:   · how much to take  · when to take this  · reasons to take this  · additional instructions     * insulin aspart U-100 100 unit/mL (3 mL) Inpn pen  Commonly known as: NovoLOG  Inject 9-18 Units into the skin daily with breakfast. Give 9 units if eats <50% of meal, give full dose of 18 units if eats >50% of meal    Administer subcutaneously with meal. HOLD prandial (mealtime) insulin if patient is NPO, unable to eat, or if Blood Glucose less than 70 mg/dL.    If patient ate prior to BG check, administer scheduled Novolog only (do not cover with correction dose at this time).  What changed: You were already taking a medication with the same name, and this prescription was added. Make  sure you understand how and when to take each.     * insulin aspart U-100 100 unit/mL (3 mL) Inpn pen  Commonly known as: NovoLOG  Inject 9-18 Units into the skin with lunch. Give 9 units if eats <50% of meal, give full dose of 18 units if eats >50% of meal    Administer subcutaneously with meal. HOLD prandial (mealtime) insulin if patient is NPO, unable to eat, or if Blood Glucose less than 70 mg/dL.    If patient ate prior to BG check, administer scheduled Novolog only (do not cover with correction dose at this time).  What changed: You were already taking a medication with the same name, and this prescription was added. Make sure you understand how and when to take each.     * insulin aspart U-100 100 unit/mL (3 mL) Inpn pen  Commonly known as: NovoLOG  Inject 6-12 Units into the skin daily with dinner or evening meal. Give 6 units if patient eats <50% of meal, give full dose 12 units if eats more than 50% of meal.    Administer subcutaneously with meal. HOLD prandial (mealtime) insulin if patient is NPO,  unable to eat, or if Blood Glucose less than 70 mg/dL.    If patient ate prior to BG check, administer scheduled Novolog only (do not cover with correction dose at this time).  What changed: You were already taking a medication with the same name, and this prescription was added. Make sure you understand how and when to take each.     insulin glargine 100 unit/mL injection  Commonly known as: Lantus  Inject 6 Units into the skin 2 (two) times a day.  What changed:   · how much to take  · when to take this         * This list has 5 medication(s) that are the same as other medications prescribed for you. Read the directions carefully, and ask your doctor or other care provider to review them with you.            CONTINUE taking these medications    amiodarone 200 MG Tab  Commonly known as: PACERONE  Take 1 tablet (200 mg total) by mouth once daily.     atorvastatin 40 MG tablet  Commonly known as: LIPITOR  Take 1  "tablet (40 mg total) by mouth once daily.     blood sugar diagnostic Strp  1 strip by Misc.(Non-Drug; Combo Route) route 2 (two) times a day.     cetirizine 10 MG tablet  Commonly known as: ZYRTEC  Take 1 tablet (10 mg total) by mouth every evening.     clopidogreL 75 mg tablet  Commonly known as: PLAVIX  Take 1 tablet (75 mg total) by mouth once daily.     diclofenac sodium 1 % Gel  Commonly known as: VOLTAREN  Apply 4 g topically 3 (three) times daily. Apply to sacrun closed skin/buttocks     ELIQUIS 5 mg Tab  Generic drug: apixaban  Take 1 tablet (5 mg total) by mouth 2 (two) times daily.     ergocalciferol 50,000 unit Cap  Commonly known as: ERGOCALCIFEROL  Take 1 capsule (50,000 Units total) by mouth every 7 days.     lacosamide 100 mg Tab  Commonly known as: VIMPAT  Take 1 tablet (100 mg total) by mouth every 12 (twelve) hours.     lancets Misc  Commonly known as: ONETOUCH ULTRASOFT LANCETS  1 lancet by Misc.(Non-Drug; Combo Route) route 2 (two) times a day.     losartan 25 MG tablet  Commonly known as: COZAAR  Take 1 tablet (25 mg total) by mouth once daily.     magnesium oxide 400 mg (241.3 mg magnesium) tablet  Commonly known as: MAG-OX  Take 1 tablet (400 mg total) by mouth 2 (two) times daily.     metoprolol succinate 50 MG 24 hr tablet  Commonly known as: TOPROL-XL  Take 1 tablet (50 mg total) by mouth once daily.     MULTIVITAMIN ORAL  Take 1 tablet by mouth once daily.     nitroGLYCERIN 0.4 MG SL tablet  Commonly known as: NITROSTAT  Place 1 tablet (0.4 mg total) under the tongue every 5 (five) minutes as needed for Chest pain.     pantoprazole 40 MG tablet  Commonly known as: PROTONIX  Take 1 tablet (40 mg total) by mouth once daily.     * pen needle, diabetic 30 gauge x 5/16" Ndle  Commonly known as: PEN NEEDLE  1 Units by Misc.(Non-Drug; Combo Route) route 3 (three) times daily.     * BD ULTRA-FINE SHORT PEN NEEDLE 31 gauge x 5/16" Ndle  Generic drug: pen needle, diabetic  3 (three) times daily.   "   polycarbophil 625 mg tablet  Commonly known as: FIBERCON  Take 1 tablet by mouth once daily.     senna-docusate 8.6-50 mg 8.6-50 mg per tablet  Commonly known as: PERICOLACE  Take 1 tablet by mouth once daily.     sucralfate 1 gram tablet  Commonly known as: CARAFATE  Take 1 tablet (1 g total) by mouth 3 (three) times daily before meals.         * This list has 2 medication(s) that are the same as other medications prescribed for you. Read the directions carefully, and ask your doctor or other care provider to review them with you.            STOP taking these medications    albuterol 90 mcg/actuation inhaler  Commonly known as: PROVENTIL/VENTOLIN HFA     aspirin 81 MG EC tablet  Commonly known as: ECOTRIN     diltiaZEM 120 MG Cp24  Commonly known as: CARDIZEM CD     gabapentin 100 MG capsule  Commonly known as: NEURONTIN     metFORMIN 1000 MG tablet  Commonly known as: GLUCOPHAGE     neomycin-bacitracin-polymyxin ointment  Commonly known as: NEOSPORIN            Indwelling Lines/Drains at time of discharge:   Lines/Drains/Airways     None               Time spent on the discharge of patient: 60 minutes  This service was provided by Virtual Visit.   Patient was seen and examined on the date of discharge.  Additional time was spent speaking with consultants and case management, reviewing records, and/or discussing the plan of care with patient/family.  The [facility] location is: [facility]  Admitted 4/11/2022  8:55 PM  Present with the patient at the time of the telemed/virtual assessment: Telepresenter    Patient was transferred to [facility] on:  4/22/2022   This document was prepared by chart review and may not directly reflect my personal knowledge of the patient's case, clinical course, or significant events during the hospital stay.    The attending portion of this evaluation, treatment, and documentation was performed per Tiffany Awad MD via Telemedicine AudioVisual using the secure [employer]  software platform with 2 way audio/video. The provider was located off-site and the patient is located in the hospital. The aforementioned video software was utilized to document the relevant history and physical exam.    Tiffany Awad MD  Department of Hospital Medicine  [facility] - Telemetry Stepdown

## 2022-05-03 NOTE — ASSESSMENT & PLAN NOTE
Remains requiring adult diapers  Post-void residual ordered to assess for retention  Initiate timed toileting for bladder training  Urology referral placed

## 2022-05-03 NOTE — PROGRESS NOTES
[facility] - Telemetry [facility]  Telemedicine Progress Note    Patient Name: Kamar Muñoz  MRN: [id number]  Patient Class: IP- Inpatient   Admission Date: 4/11/2022  Length of Stay: 21 days  Attending Physician: Tiffany Awad MD  Primary Care Provider: Basim Guerrero MD    Subjective:     Principal Problem:Diabetic ketoacidosis without coma associated with type 2 diabetes mellitus    HPI:  Mr. Kamar Muñoz is a 76 y.o. male with a past medical history of HTN, Type 2 DM, CAD, STEMI, HLD, paroxysmal Afib, CVA, seizures and recurrent DKA.  He presented to [facility] ED on 4/11 with elevated blood glucose.  He was discharged from [facility] on 4/10 after being admitted for DKA on 4/5.  At time of exam patient is alert but altered and unable to provide any history.  Spoke with patient's daughter who states she is a primary caregiver at home and obtained history as well as review of chart.  Per family he was not feeling well after discharge to home and vomited several times on 4/11. Unknown characteristics of emesis. Finger stick at home read High with unreadable blood glucose.  At this time daughter gave him 14 units of insulin and sublingual zofran. Other than malaise and nausea patient was not exhibiting any other signs/symptoms at home.  In ER BG found to be 1015 with pCO2 6 and anion gap 35.  Hyperkalemic with K 7.0 and some EKG changes when compared to previous EKG.  Given calcium gluconate, and started on insulin gtt as well as subQ long acting insulin.      Critical Care Medicine consulted for DKA and admitted to MICU.        Overview/Hospital Course:  Admitted to MICU on 4/11 for DKA with BG >1000, pCO2 6, AG 35, and hyperkalemia.  Given Calcium gluconate and started on insulin gtt in ED.  Hyperkalemia not improved on repeat labs and bolused with IV insulin.  Infectious workup sent and broad spectrum abx started.  4/12 potassium improving with insulin. 4/13 Gap closed, insulin gtt turned  off and switched to subq insulin. Endocrine consulted for insulin mgmt. He was stepped down to  4/14.    Since step down stable. Advanced diet. Endocrine following and titrating insulin. Poor po intake making it difficult to adjust insulin. CT chest with cavitary lesion, pulmonary and ID consulted. SNF once medically stable for dc.      Telemedicine  This service was provided by Virtual Visit.    Patient was transferred to [facility] on:  04/22/2022     Chief Complaint   Patient presents with    Altered Mental Status     The patient location is: 92/92 A   Admitted 4/11/2022  8:55 PM  Present with the patient at the time of the telemed/virtual assessment: Telepresenter    Interval History / Events Overnight:   The patient is able to provide adequate history. Additional history was obtained from past medical records. No significant events reported by Nursing.    Patient complains of feeling weak. Symptoms have been unchanged since yesterday. Associated symptoms include: fatigue. Symptoms are stable.     Lab test(s) reviewed: hyperglycemia    Review of Systems   Constitutional:  Negative for fever.   Respiratory:  Negative for shortness of breath.    Musculoskeletal:  Positive for gait problem.   Neurological:  Positive for weakness.     Objective:     Vital Signs (Most Recent):  Temp: 97.5 °F (36.4 °C) (05/02/22 0802)  Pulse: 63 (05/02/22 0802)  Resp: 18 (05/02/22 0802)  BP: (!) 156/69 (05/02/22 0802)  SpO2: 96 % (05/02/22 0847)   Vital Signs (24h Range):  Temp:  [97.5 °F (36.4 °C)-98 °F (36.7 °C)] 97.5 °F (36.4 °C)  Pulse:  [55-68] 63  Resp:  [17-20] 18  SpO2:  [95 %-97 %] 96 %  BP: (101-156)/(55-71) 156/69     Weight: 81.6 kg (179 lb 14.3 oz)  Body mass index is 25.09 kg/m².    Intake/Output Summary (Last 24 hours) at 5/2/2022 1056  Last data filed at 5/2/2022 0843  Gross per 24 hour   Intake 240 ml   Output --   Net 240 ml        Physical Exam  Constitutional:       General: He is not in acute  distress.     Appearance: Normal appearance.   Eyes:      General: Lids are normal. No scleral icterus.        Right eye: No discharge.         Left eye: No discharge.      Conjunctiva/sclera: Conjunctivae normal.   Neck:      Trachea: Phonation normal.   Cardiovascular:      Rate and Rhythm: Normal rate.      Comments: Monitor / Vital signs reviewed at time of visit  Pulmonary:      Effort: Pulmonary effort is normal. No tachypnea, accessory muscle usage or respiratory distress.   Abdominal:      General: There is no distension.   Skin:     Coloration: Skin is not cyanotic.   Neurological:      Mental Status: He is alert. He is not disoriented.   Psychiatric:         Attention and Perception: Attention normal.         Mood and Affect: Affect normal. Mood is depressed.         Behavior: Behavior is cooperative.       Significant Labs:   Recent Labs   Lab 12/29/21  0810 01/26/22  1512 04/05/22  1138   HGBA1C 12.3* 11.5* 9.7*       Recent Labs   Lab 05/01/22  2036 05/02/22  0504 05/02/22  0804   POCTGLUCOSE 279* 192* 273*       Recent Labs   Lab 04/27/22  0459 04/29/22  0335 05/02/22  0553   WBC 6.86 5.46 5.39   HGB 11.2* 11.6* 11.7*   HCT 35.1* 35.5* 36.5*    227 203       Recent Labs   Lab 04/27/22 0459 04/29/22  0335 05/02/22  0553   GRAN 53.1  3.6 49.6  2.7 51.6  2.8   LYMPH 31.5  2.2 31.5  1.7 29.3  1.6   MONO 8.6  0.6 11.4  0.6 9.6  0.5   EOS 0.4 0.4 0.4       Recent Labs   Lab 04/27/22 0459 04/29/22  0335 05/02/22  0553   * 138 138   K 4.0 3.7 4.4    103 102   CO2 27 25 28   BUN 20 17 18   CREATININE 0.8 0.8 0.8   * 40* 215*   CALCIUM 8.4* 8.5* 8.5*   ALBUMIN 2.4* 2.6* 2.6*   MG 1.5* 1.9 1.5*   PHOS 4.1 2.6* 3.0       Recent Labs   Lab 04/27/22  0459 04/29/22  0335 05/02/22  0553   ALKPHOS 97 97 97   ALT 38 54* 43   AST 47* 88* 38   PROT 5.6* 5.8* 6.1   BILITOT 0.5 0.4 0.5       Recent Labs   Lab 02/19/22  2242 02/20/22  0046 03/03/22  2113 03/05/22  1037 03/05/22  1939  03/07/22 2043 04/11/22  2236 04/12/22  0207 04/12/22  0618 04/12/22  1051 04/12/22  1324   PROCAL 0.93*  --   --   --   --   --   --  1.18*  --   --   --    LACTATE  --    < >  --    < > 1.0  --    < > 8.2* 4.2* 3.2* 2.0   DDIMER 0.84*   < > 0.50*  --  0.50* 0.43  --   --   --   --   --    FERRITIN 564*   < > 300  --  354* 334*  --   --   --   --   --    SEDRATE 92*  --   --   --   --   --   --   --   --   --   --     < > = values in this interval not displayed.       Results for orders placed or performed in visit on 05/05/14   Vitamin D   Result Value Ref Range    Vit D, 25-Hydroxy 24 (L) 30 - 96 ng/mL     SARS-CoV2 (COVID-19) Qualitative PCR (no units)   Date Value   03/21/2022 Not Detected   03/17/2022 Not Detected   03/14/2022 Not Detected   03/08/2022 Not Detected     POC Rapid COVID (no units)   Date Value   04/05/2022 Negative   02/17/2022 Positive (A)   01/25/2022 Negative   01/12/2022 Negative   01/09/2022 Negative       ECG Results              EKG 12-lead (Final result)  Result time 04/12/22 12:51:09      Final result by Interface, Lab In [facility] (04/12/22 12:51:09)                   Narrative:    Test Reason : R73.9,    Vent. Rate : 118 BPM     Atrial Rate : 111 BPM     P-R Int : 000 ms          QRS Dur : 162 ms      QT Int : 436 ms       P-R-T Axes : 000 -14 052 degrees     QTc Int : 611 ms    Wide QRS rhythm  Right bundle branch block  ST elevation anterior leads differential includes anterior STEMI vs,  repolarization abnormality  Abnormal ECG  When compared with ECG of 05-APR-2022 11:41,  Wide QRS rhythm has replaced Sinus rhythm  Vent. rate has increased BY  48 BPM  Confirmed by Dayami SHAW, Megha ([id number]) on 4/12/2022 12:51:00 PM    Referred By: [email or web address]   SELF           Confirmed By:Megha Stokc MD                                    Results for orders placed during the hospital encounter of 01/25/22    Echo    Interpretation Summary  · There is abnormal septal wall motion.  · The left  ventricle is normal in size with low normal systolic function.  · The estimated ejection fraction is 50%.  · Normal left ventricular diastolic function.  · Mild left atrial enlargement.  · Normal right ventricular size with normal right ventricular systolic function.  · Mild mitral regurgitation.  · There are segmental left ventricular wall motion abnormalities.  · Mild tricuspid regurgitation.  · Elevated central venous pressure (15 mmHg).      NM Gastric Emptying  Narrative: EXAMINATION:  NM GASTRIC EMPTYING    CLINICAL HISTORY:  chronic nausea in diabetic;    TECHNIQUE:  The patient received 1.0 mCi orally of sulfur colloid labeled meal in less than 10 minutes.    Anterior and posterior projection images were obtained immediately and at 60 minute intervals for 4 hours.    Geometric mean of the anterior and the posterior images was generated. The decay-corrected time activity curve and the percentage of the retention and emptying were obtained.    COMPARISON:  CT abdomen pelvis 04/13/2022.    FINDINGS:  At 4 hour(s) the percentage of retention is 2 % (normal retention at 4 hours is 10% and lower).  Impression: Normal gastric emptying time.    I, Bam Booth MD, attest that I reviewed and interpreted the images.    Electronically signed by resident: Cisco Alvarado MD  Date:    04/29/2022  Time:    14:09    Electronically signed by: Bam Booth  Date:    04/29/2022  Time:    14:18      Labs and Imaging within the last 24 hours listed above were reviewed.       Diet: Diet diabetic [facility]; 2000 Calorie  Diet diabetic  Significant LDAs:   IV Access Type: Peripheral  Urinary Catheter Indication if present: Patient Does Not Have Urinary Catheter  Other Lines/Tubes/Drains:         Goals of Care:    Previous admission:  4/5/22  Likely prognosis:  Fair  Code Status: DNR  Comfort Only: No  Hospice: No  Goals at discharge: remain at home, with physician follow-up    Discharge Planning   ALLIE: 5/2/2022     Code Status:  "DNR   Is the patient medically ready for discharge?: Yes    Reason for patient still in hospital (select all that apply): Patient trending condition and Consult recommendations  Discharge Plan A: Home, Home Health, Other (24/7 sitters and family support)   Discharge Delays: None known at this time      Assessment/Plan:      * Pulmonary cavitary lesion  - As per CT, noted about a month ago  - Repeat CT Chest without contrast obtained on 4/19 revealed "an evolving appearance of a right upper lobe posterior segment cavitary lesion with internal dependent mass; the cavity demonstrates a thinner wall and has decreased in diameter.  There is adjacent contracture of parenchyma.  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  The central debris is decreased in size,, now measuring 1.0 cm (axial series 4, image 81), previously 2.0 cm, and various other nodules".   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  - 1/11/2022 Culture with MAC pending susceptibilities   - 1/11/2022 2nd AFB culture with pending results  - Differential includes MAC, aspergillosis, chronic aspiration and less likely malignancy   - Pulmonary rec Augmentin x 7 days   - Induced sputum and send for cultures ordered, and AFB - has not been able to supply sample  - ID consulted and recommend repeat AFB sputum  - Poor candidate for surgical intervention given he is on triple therapy (eliquis + DAPT) for both afib and recent STEMI with LAUREN stent placed in January  - suspect his weight loss and recent decline worsened by underlying infection  - Repeat Chest CT in 3 months - 7/2022   - No indication for bronchoscopy at this time  - Can continue follow up with Dr. Louie outpatient.   - On RA and denies respiratory symptoms    Nausea  Numerous episodes of nausea (and vomiting at home) during this admit and with prior admits  -no history of peptic ulcer disease per the patient but definite reflux " symptoms  -therapy with pantoprazole alone ineffective; symptoms persist with addition of Carafate.  -suspect is multifactorial with GERD and poor gastric emptying suspected  -he is not a candidate for Reglan due to history of seizure  -currently requiring scheduled Zofran with meals to improve symptoms  -he is having regular bowel movements which are soft  -discussed with GI and proceeded with GES which was normal  -appears stable with Zofran    Dyslipidemia associated with type 2 diabetes mellitus  - Continuing current management with statin    Bacterial pneumonia  Completed 7 days of Augmentin    Goals of care, counseling/discussion  - DNR as per ICU discussion with patient and family    Functional urinary incontinence  Remains requiring adult diapers  Post-void residual ordered to assess for retention  Initiate timed toileting for bladder training    Severe malnutrition  Nutrition consulted. Most recent weight and BMI monitored- Body mass index is 25.09 kg/m².      -he is drinking more Boost than eating food so increased Boost supplements to 2 servings with each tray.     Measurements:  Wt Readings from Last 1 Encounters:   04/27/22 81.6 kg (179 lb 14.3 oz)   Body mass index is 25.09 kg/m².    Recommendations: Recommendation/Intervention: 1. Continue current Diabetic diet + ONS. 2. RD to monitor & follow-up.  Goals: Meet % EEN, EPN by RD f/u date    Patient has been screened and assessed by RD. RD will follow patient.    Discharge planning issues  PT/OT recommends skilled nursing facility for ongoing therapy; patient acknowledges and agrees with the need for ongoing therapy but unfortunately he is required to pay a co-pay for further SNF days.    He feels his only options to go home with limited sitter assistance and home health; his son has made arrangements for him to go to his preferred facility, [facility] but the patient feels he cannot manage the financial implications of going there; patient's  son is committed to getting patient to [facility] which is likely his safest disposition as patient needs 24/7 care.  Patient continues to require 24/7 care for safety, ambulation, meal preparation, and medication management. Patient requesting to remain in hospital, refuses NH placement, and does not have adequate 24/7 care in place for safe discharge to home. Family continues to support NH placement at [facility].   Capacity for participation in discharge plan unclear; concern for depression and reluctance to leave his seriously ill wife hospitalized in this hospital.   Consulted Psychiatry for capacity assessment, evaluation of depression.  Patient found to have capacity. Zoloft started for depression. Continues to refuse NH; requires 24/7 assistance for safety and medication / meal management.    Ketosis-prone diabetes mellitus  - DKA now resolved  - Endocrine following and adjusting insulin regimen as needed  - SSI provided for corrective dosing  - Hypoglycemic protocol in effect  -Endocrine following due to erratic glucoses; notified of plan to discharge home in light of re-admits with DKA and eats regular diet at home  -patient needs assistance with 3 discrete meals of consistent carbs per day and insulin management at home.  -Per Endocrinology: the fixed doses of insulin recommended are to cover meals containing 60 g of carbohydrate and it is very important that meals are consistently containing 60 g of carbs.  If patient exceeds 60 g of carbohydrate per meal or consumes any carbohydrates between meals without insulin coverage, this will likely again lead to significant hyperglycemia.      Focal seizures  - History of seizures treated with lacosamide.    - Continue home lacosamide    Coronary artery disease  - Stable  - Continue home regimen of atorvastatin, clopidogrel, losartan, and metoprolol  - ASA discontinued 4/13 due to bleeding risk with triple therapy; continuing Plavix and  Eliquis    Paroxysmal atrial fibrillation  - History of A-fib.    - Continue home regimen of amiodarone, apixaban, and metoprolol    History of embolic stroke  Continuing current management with apixaban    Essential hypertension  - resume home meds as tolerated  - holding Cozaar initially; resumed 5/2        Active Hospital Problems    Diagnosis  POA    At high risk for skin breakdown [Z91.89]  Yes    Nausea [R11.0]  Yes    Dyslipidemia associated with type 2 diabetes mellitus [E11.69, E78.5]  Yes    Bacterial pneumonia [J15.9]  Yes    Pulmonary cavitary lesion [J98.4]  Yes    Goals of care, counseling/discussion [Z71.89]  Not Applicable    Functional urinary incontinence [R39.81]  Yes    Severe malnutrition [E43]  Yes    Discharge planning issues [Z02.9]  Not Applicable    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]  Yes    Focal seizures [R56.9]  Yes     Chronic    Coronary artery disease [I25.10]  Yes    Paroxysmal atrial fibrillation [I48.0]  Yes     Chronic    History of embolic stroke [Z86.73]  Not Applicable    Essential hypertension [I10]  Yes     Chronic      Resolved Hospital Problems    Diagnosis Date Resolved POA    *Diabetic ketoacidosis without coma associated with type 2 diabetes mellitus [E11.10] 04/20/2022 Yes    Encephalopathy [G93.40] 04/13/2022 Yes    Lactic acidosis [E87.2] 04/21/2022 Yes    Hyperkalemia [E87.5] 04/13/2022 Yes    BRENDAN (acute kidney injury) [N17.9] 04/21/2022 Yes       Inpatient Medications Prescribed for Management of Current Problems:     Scheduled Meds:    amiodarone  200 mg Oral Daily    apixaban  5 mg Oral BID    atorvastatin  40 mg Oral Daily    clopidogreL  75 mg Oral Daily    docusate sodium  50 mg Oral BID    [START ON 5/3/2022] insulin aspart U-100  6-12 Units Subcutaneous with dinner    [START ON 5/3/2022] insulin aspart U-100  9-18 Units Subcutaneous with breakfast    [START ON 5/3/2022] insulin aspart U-100  9-18 Units Subcutaneous with lunch     insulin detemir U-100  6 Units Subcutaneous BID    lacosamide  100 mg Oral Q12H    losartan  25 mg Oral Daily    metoprolol succinate  50 mg Oral Daily    ondansetron  4 mg Oral TID WM    pantoprazole  40 mg Oral Daily    polyethylene glycol  17 g Oral Daily    senna-docusate 8.6-50 mg  1 tablet Oral BID    sertraline  25 mg Oral Daily    sucralfate  1 g Oral QID (AC & HS)    tamsulosin  0.4 mg Oral Daily     Continuous Infusions:   As Needed: acetaminophen, calcium carbonate, cloNIDine, dextrose 10%, dextrose 10%, diphenhydrAMINE, glucagon (human recombinant), glucose, glucose, hydrALAZINE, insulin aspart U-100, insulin aspart U-100, melatonin, ondansetron, prochlorperazine, senna, simethicone    VTE Risk Mitigation (From admission, onward)         Ordered     apixaban tablet 5 mg  2 times daily         04/20/22 1209              I have assessed these finding virtually using telemed platform and with assistance of bedside nurse     The attending portion of this evaluation, treatment, and documentation was performed per Tiffany Awad MD via Telemedicine AudioVisual using the secure [employer] software platform with 2 way audio/video. The provider was located off-site and the patient is located in the hospital. The aforementioned video software was utilized to document the relevant history and physical exam    Tiffany Awad MD  Department of Hospital Medicine   [facility] - Telemetry Stepdown

## 2022-05-03 NOTE — ASSESSMENT & PLAN NOTE
Numerous episodes of nausea (and vomiting at home) during this admit and with prior admits  -no history of peptic ulcer disease per the patient but definite reflux symptoms  -therapy with pantoprazole alone ineffective; symptoms persist with addition of Carafate.  -suspect is multifactorial with GERD and poor gastric emptying suspected  -he is not a candidate for Reglan due to history of seizure  -currently requiring scheduled Zofran with meals to improve symptoms  -he is having regular bowel movements which are soft  -discussed with GI and proceeded with GES which was normal  -appears stable with Zofran

## 2022-05-04 NOTE — PLAN OF CARE
[facility] - Telemetry Stepdown  Discharge Final Note    Primary Care Provider: Basim Guerrero MD    Expected Discharge Date: 5/3/2022    Final Discharge Note (most recent)     Final Note - 05/04/22 0739        Final Note    Assessment Type Final Discharge Note     Anticipated Discharge Disposition residential Nursing Home     Hospital Resources/Appts/Education Provided Provided patient/caregiver with written discharge plan information        Post-Acute Status    Post-Acute Authorization Placement;Home Health     Post-Acute Placement Status Set-up Complete/Auth obtained     Home Health Status Discharge Plan Changed     Discharge Delays None known at this time                 Important Message from Medicare  Important Message from Medicare regarding Discharge Appeal Rights: Given to patient/caregiver, Explained to patient/caregiver, Signed/date by patient/caregiver     Date IMM was signed: 05/02/22  Time IMM was signed: 1019    Contact Info     [facility] ENDOCRINOLOGY   Specialty: Endocrinology    [address]  [facility] [address]   Phone: [phone]       Next Steps: Schedule an appointment as soon as possible for a visit in 1 week(s)    Instructions: For discharge from hospital follow up, To establish care    [facility] UROLOGY   Specialty: Urology    [address]  [facility] [address]   Phone: [phone]       Next Steps: Schedule an appointment as soon as possible for a visit    Instructions: To establish care    Zhang Louie MD   Specialty: Pulmonary Disease    [address]  [address]  [facility] [address]   Phone: [phone]       Next Steps: Schedule an appointment as soon as possible for a visit    Instructions: As previously planned        Future Appointments   Date Time Provider Department Center   5/5/2022  5:00 PM Basim Guerrero MD [facility]   5/16/2022  2:00 PM DIABETES DISCHARGE CLINIC [facility]     Pt admitted to [facility].   Pt's son (Kamar) and daughter (Basia) were notified of discharge today.  Pt discharged via wheel chair van/Room Air.    Oliva Lorenzo LMSW  PRN-  [facility]  Ext. [phone]

## 2022-05-09 LAB
ACID FAST MOD KINY STN SPEC: ABNORMAL
MYCOBACTERIUM SPEC QL CULT: ABNORMAL

## 2022-05-15 ENCOUNTER — HOSPITAL ENCOUNTER (INPATIENT)
Facility: HOSPITAL | Age: 77
LOS: 3 days | Discharge: NURSING, REHAB OR LONG-TERM CARE | DRG: 638 | End: 2022-05-18
Attending: EMERGENCY MEDICINE | Admitting: HOSPITALIST
Payer: MEDICARE

## 2022-05-15 DIAGNOSIS — R73.9 HYPERGLYCEMIA: ICD-10-CM

## 2022-05-15 DIAGNOSIS — E11.00 HYPEROSMOLAR HYPERGLYCEMIC STATE (HHS): Primary | ICD-10-CM

## 2022-05-15 DIAGNOSIS — E11.10 DKA (DIABETIC KETOACIDOSIS): ICD-10-CM

## 2022-05-15 DIAGNOSIS — Z91.89 AT HIGH RISK FOR SKIN BREAKDOWN: ICD-10-CM

## 2022-05-15 DIAGNOSIS — F43.21 ADJUSTMENT DISORDER WITH DEPRESSED MOOD: ICD-10-CM

## 2022-05-15 DIAGNOSIS — E11.10 DIABETIC KETOACIDOSIS WITHOUT COMA ASSOCIATED WITH TYPE 2 DIABETES MELLITUS: ICD-10-CM

## 2022-05-15 LAB
ALBUMIN SERPL BCP-MCNC: 3.2 G/DL (ref 3.5–5.2)
ALLENS TEST: ABNORMAL
ALP SERPL-CCNC: 130 U/L (ref 55–135)
ALT SERPL W/O P-5'-P-CCNC: 28 U/L (ref 10–44)
ANION GAP SERPL CALC-SCNC: 28 MMOL/L (ref 8–16)
ANION GAP SERPL CALC-SCNC: 31 MMOL/L (ref 8–16)
AST SERPL-CCNC: 18 U/L (ref 10–40)
B-OH-BUTYR BLD STRIP-SCNC: 6.8 MMOL/L (ref 0–0.5)
BACTERIA #/AREA URNS AUTO: ABNORMAL /HPF
BASOPHILS # BLD AUTO: 0.03 K/UL (ref 0–0.2)
BASOPHILS NFR BLD: 0.3 % (ref 0–1.9)
BILIRUB SERPL-MCNC: 0.4 MG/DL (ref 0.1–1)
BILIRUB UR QL STRIP: NEGATIVE
BUN SERPL-MCNC: 38 MG/DL (ref 8–23)
BUN SERPL-MCNC: 39 MG/DL (ref 8–23)
BUN SERPL-MCNC: 50 MG/DL (ref 6–30)
BUN SERPL-MCNC: 62 MG/DL (ref 6–30)
CALCIUM SERPL-MCNC: 8.7 MG/DL (ref 8.7–10.5)
CALCIUM SERPL-MCNC: 9.5 MG/DL (ref 8.7–10.5)
CHLORIDE SERPL-SCNC: 101 MMOL/L (ref 95–110)
CHLORIDE SERPL-SCNC: 101 MMOL/L (ref 95–110)
CHLORIDE SERPL-SCNC: 91 MMOL/L (ref 95–110)
CHLORIDE SERPL-SCNC: 97 MMOL/L (ref 95–110)
CLARITY UR REFRACT.AUTO: ABNORMAL
CO2 SERPL-SCNC: 11 MMOL/L (ref 23–29)
CO2 SERPL-SCNC: 11 MMOL/L (ref 23–29)
COLOR UR AUTO: YELLOW
CREAT SERPL-MCNC: 2 MG/DL (ref 0.5–1.4)
CREAT SERPL-MCNC: 2.1 MG/DL (ref 0.5–1.4)
CREAT SERPL-MCNC: 2.6 MG/DL (ref 0.5–1.4)
CREAT SERPL-MCNC: 2.7 MG/DL (ref 0.5–1.4)
DIFFERENTIAL METHOD: ABNORMAL
EOSINOPHIL # BLD AUTO: 0 K/UL (ref 0–0.5)
EOSINOPHIL NFR BLD: 0 % (ref 0–8)
ERYTHROCYTE [DISTWIDTH] IN BLOOD BY AUTOMATED COUNT: 14.6 % (ref 11.5–14.5)
EST. GFR  (AFRICAN AMERICAN): 25 ML/MIN/1.73 M^2
EST. GFR  (AFRICAN AMERICAN): 26.1 ML/MIN/1.73 M^2
EST. GFR  (NON AFRICAN AMERICAN): 21.6 ML/MIN/1.73 M^2
EST. GFR  (NON AFRICAN AMERICAN): 22.6 ML/MIN/1.73 M^2
GLUCOSE SERPL-MCNC: 540 MG/DL (ref 70–110)
GLUCOSE SERPL-MCNC: 651 MG/DL (ref 70–110)
GLUCOSE SERPL-MCNC: 683 MG/DL (ref 70–110)
GLUCOSE SERPL-MCNC: 763 MG/DL (ref 70–110)
GLUCOSE UR QL STRIP: ABNORMAL
HCO3 UR-SCNC: 14.9 MMOL/L (ref 24–28)
HCT VFR BLD AUTO: 44.9 % (ref 40–54)
HCT VFR BLD CALC: 43 %PCV (ref 36–54)
HCT VFR BLD CALC: 43 %PCV (ref 36–54)
HGB BLD-MCNC: 13.6 G/DL (ref 14–18)
HGB UR QL STRIP: ABNORMAL
HYALINE CASTS UR QL AUTO: 3 /LPF
IMM GRANULOCYTES # BLD AUTO: 0.07 K/UL (ref 0–0.04)
IMM GRANULOCYTES NFR BLD AUTO: 0.7 % (ref 0–0.5)
KETONES UR QL STRIP: ABNORMAL
LEUKOCYTE ESTERASE UR QL STRIP: ABNORMAL
LYMPHOCYTES # BLD AUTO: 1.3 K/UL (ref 1–4.8)
LYMPHOCYTES NFR BLD: 11.8 % (ref 18–48)
MCH RBC QN AUTO: 29.4 PG (ref 27–31)
MCHC RBC AUTO-ENTMCNC: 30.3 G/DL (ref 32–36)
MCV RBC AUTO: 97 FL (ref 82–98)
MICROSCOPIC COMMENT: ABNORMAL
MONOCYTES # BLD AUTO: 0.9 K/UL (ref 0.3–1)
MONOCYTES NFR BLD: 8.8 % (ref 4–15)
NEUTROPHILS # BLD AUTO: 8.4 K/UL (ref 1.8–7.7)
NEUTROPHILS NFR BLD: 78.4 % (ref 38–73)
NITRITE UR QL STRIP: NEGATIVE
NRBC BLD-RTO: 0 /100 WBC
PCO2 BLDA: 39.1 MMHG (ref 35–45)
PH SMN: 7.19 [PH] (ref 7.35–7.45)
PH UR STRIP: 5 [PH] (ref 5–8)
PLATELET # BLD AUTO: 252 K/UL (ref 150–450)
PMV BLD AUTO: 10.1 FL (ref 9.2–12.9)
PO2 BLDA: 23 MMHG (ref 40–60)
POC BE: -13 MMOL/L
POC IONIZED CALCIUM: 1.08 MMOL/L (ref 1.06–1.42)
POC IONIZED CALCIUM: 1.15 MMOL/L (ref 1.06–1.42)
POC SATURATED O2: 28 % (ref 95–100)
POC TCO2 (MEASURED): 16 MMOL/L (ref 23–29)
POC TCO2 (MEASURED): 20 MMOL/L (ref 23–29)
POC TCO2: 16 MMOL/L (ref 24–29)
POCT GLUCOSE: >500 MG/DL (ref 70–110)
POTASSIUM BLD-SCNC: 5.6 MMOL/L (ref 3.5–5.1)
POTASSIUM BLD-SCNC: 7.7 MMOL/L (ref 3.5–5.1)
POTASSIUM SERPL-SCNC: 5.4 MMOL/L (ref 3.5–5.1)
POTASSIUM SERPL-SCNC: 5.7 MMOL/L (ref 3.5–5.1)
PROT SERPL-MCNC: 7.5 G/DL (ref 6–8.4)
PROT UR QL STRIP: NEGATIVE
RBC # BLD AUTO: 4.62 M/UL (ref 4.6–6.2)
RBC #/AREA URNS AUTO: 92 /HPF (ref 0–4)
SAMPLE: ABNORMAL
SITE: ABNORMAL
SODIUM BLD-SCNC: 134 MMOL/L (ref 136–145)
SODIUM BLD-SCNC: 136 MMOL/L (ref 136–145)
SODIUM SERPL-SCNC: 133 MMOL/L (ref 136–145)
SODIUM SERPL-SCNC: 136 MMOL/L (ref 136–145)
SP GR UR STRIP: 1.02 (ref 1–1.03)
SQUAMOUS #/AREA URNS AUTO: 0 /HPF
URN SPEC COLLECT METH UR: ABNORMAL
WBC # BLD AUTO: 10.68 K/UL (ref 3.9–12.7)
WBC #/AREA URNS AUTO: 29 /HPF (ref 0–5)
YEAST UR QL AUTO: ABNORMAL

## 2022-05-15 PROCEDURE — 85025 COMPLETE CBC W/AUTO DIFF WBC: CPT

## 2022-05-15 PROCEDURE — 12000002 HC ACUTE/MED SURGE SEMI-PRIVATE ROOM

## 2022-05-15 PROCEDURE — 93005 ELECTROCARDIOGRAM TRACING: CPT

## 2022-05-15 PROCEDURE — 86803 HEPATITIS C AB TEST: CPT | Performed by: EMERGENCY MEDICINE

## 2022-05-15 PROCEDURE — 99291 CRITICAL CARE FIRST HOUR: CPT | Mod: ,,, | Performed by: EMERGENCY MEDICINE

## 2022-05-15 PROCEDURE — 25000003 PHARM REV CODE 250

## 2022-05-15 PROCEDURE — 99291 PR CRITICAL CARE, E/M 30-74 MINUTES: ICD-10-PCS | Mod: ,,, | Performed by: EMERGENCY MEDICINE

## 2022-05-15 PROCEDURE — 63600175 PHARM REV CODE 636 W HCPCS: Performed by: HOSPITALIST

## 2022-05-15 PROCEDURE — 25000003 PHARM REV CODE 250: Performed by: HOSPITALIST

## 2022-05-15 PROCEDURE — 81001 URINALYSIS AUTO W/SCOPE: CPT

## 2022-05-15 PROCEDURE — 99900035 HC TECH TIME PER 15 MIN (STAT)

## 2022-05-15 PROCEDURE — 99223 PR INITIAL HOSPITAL CARE,LEVL III: ICD-10-PCS | Mod: ,,, | Performed by: HOSPITALIST

## 2022-05-15 PROCEDURE — 20600001 HC STEP DOWN PRIVATE ROOM

## 2022-05-15 PROCEDURE — 93010 ELECTROCARDIOGRAM REPORT: CPT | Mod: ,,, | Performed by: INTERNAL MEDICINE

## 2022-05-15 PROCEDURE — 82803 BLOOD GASES ANY COMBINATION: CPT

## 2022-05-15 PROCEDURE — 99223 1ST HOSP IP/OBS HIGH 75: CPT | Mod: ,,, | Performed by: HOSPITALIST

## 2022-05-15 PROCEDURE — 80053 COMPREHEN METABOLIC PANEL: CPT

## 2022-05-15 PROCEDURE — 80048 BASIC METABOLIC PNL TOTAL CA: CPT | Performed by: HOSPITALIST

## 2022-05-15 PROCEDURE — 63600175 PHARM REV CODE 636 W HCPCS

## 2022-05-15 PROCEDURE — 87086 URINE CULTURE/COLONY COUNT: CPT

## 2022-05-15 PROCEDURE — 82010 KETONE BODYS QUAN: CPT

## 2022-05-15 PROCEDURE — 93010 EKG 12-LEAD: ICD-10-PCS | Mod: ,,, | Performed by: INTERNAL MEDICINE

## 2022-05-15 RX ORDER — SODIUM CHLORIDE 9 MG/ML
125 INJECTION, SOLUTION INTRAVENOUS CONTINUOUS
Status: DISCONTINUED | OUTPATIENT
Start: 2022-05-15 | End: 2022-05-16

## 2022-05-15 RX ORDER — DEXTROSE MONOHYDRATE 100 MG/ML
INJECTION, SOLUTION INTRAVENOUS
Status: DISCONTINUED | OUTPATIENT
Start: 2022-05-15 | End: 2022-05-16

## 2022-05-15 RX ORDER — DEXTROSE MONOHYDRATE AND SODIUM CHLORIDE 5; .45 G/100ML; G/100ML
125 INJECTION, SOLUTION INTRAVENOUS CONTINUOUS PRN
Status: DISCONTINUED | OUTPATIENT
Start: 2022-05-15 | End: 2022-05-16

## 2022-05-15 RX ORDER — ACETAMINOPHEN 325 MG/1
650 TABLET ORAL EVERY 4 HOURS PRN
Status: DISCONTINUED | OUTPATIENT
Start: 2022-05-15 | End: 2022-05-18 | Stop reason: HOSPADM

## 2022-05-15 RX ORDER — ONDANSETRON 2 MG/ML
4 INJECTION INTRAMUSCULAR; INTRAVENOUS
Status: COMPLETED | OUTPATIENT
Start: 2022-05-15 | End: 2022-05-15

## 2022-05-15 RX ORDER — TAMSULOSIN HYDROCHLORIDE 0.4 MG/1
0.4 CAPSULE ORAL DAILY
Status: DISCONTINUED | OUTPATIENT
Start: 2022-05-16 | End: 2022-05-18 | Stop reason: HOSPADM

## 2022-05-15 RX ORDER — AMIODARONE HYDROCHLORIDE 200 MG/1
200 TABLET ORAL DAILY
Status: DISCONTINUED | OUTPATIENT
Start: 2022-05-16 | End: 2022-05-18 | Stop reason: HOSPADM

## 2022-05-15 RX ORDER — SODIUM CHLORIDE 0.9 % (FLUSH) 0.9 %
10 SYRINGE (ML) INJECTION
Status: DISCONTINUED | OUTPATIENT
Start: 2022-05-15 | End: 2022-05-18 | Stop reason: HOSPADM

## 2022-05-15 RX ORDER — SERTRALINE HYDROCHLORIDE 25 MG/1
25 TABLET, FILM COATED ORAL DAILY
Status: DISCONTINUED | OUTPATIENT
Start: 2022-05-16 | End: 2022-05-18 | Stop reason: HOSPADM

## 2022-05-15 RX ORDER — METOPROLOL SUCCINATE 50 MG/1
50 TABLET, EXTENDED RELEASE ORAL DAILY
Status: DISCONTINUED | OUTPATIENT
Start: 2022-05-16 | End: 2022-05-18 | Stop reason: HOSPADM

## 2022-05-15 RX ORDER — LACOSAMIDE 50 MG/1
100 TABLET ORAL EVERY 12 HOURS
Status: DISCONTINUED | OUTPATIENT
Start: 2022-05-15 | End: 2022-05-18 | Stop reason: HOSPADM

## 2022-05-15 RX ORDER — ONDANSETRON 2 MG/ML
4 INJECTION INTRAMUSCULAR; INTRAVENOUS EVERY 6 HOURS PRN
Status: DISCONTINUED | OUTPATIENT
Start: 2022-05-15 | End: 2022-05-18 | Stop reason: HOSPADM

## 2022-05-15 RX ORDER — ATORVASTATIN CALCIUM 20 MG/1
40 TABLET, FILM COATED ORAL DAILY
Status: DISCONTINUED | OUTPATIENT
Start: 2022-05-16 | End: 2022-05-18 | Stop reason: HOSPADM

## 2022-05-15 RX ORDER — PANTOPRAZOLE SODIUM 40 MG/1
40 TABLET, DELAYED RELEASE ORAL DAILY
Status: DISCONTINUED | OUTPATIENT
Start: 2022-05-16 | End: 2022-05-18 | Stop reason: HOSPADM

## 2022-05-15 RX ORDER — CLOPIDOGREL BISULFATE 75 MG/1
75 TABLET ORAL DAILY
Status: DISCONTINUED | OUTPATIENT
Start: 2022-05-16 | End: 2022-05-18 | Stop reason: HOSPADM

## 2022-05-15 RX ADMIN — SODIUM CHLORIDE 1000 ML: 0.9 INJECTION, SOLUTION INTRAVENOUS at 04:05

## 2022-05-15 RX ADMIN — ONDANSETRON 4 MG: 2 INJECTION INTRAMUSCULAR; INTRAVENOUS at 06:05

## 2022-05-15 RX ADMIN — SODIUM CHLORIDE 1000 ML: 0.9 INJECTION, SOLUTION INTRAVENOUS at 05:05

## 2022-05-15 RX ADMIN — ONDANSETRON 4 MG: 2 INJECTION INTRAMUSCULAR; INTRAVENOUS at 11:05

## 2022-05-15 RX ADMIN — SODIUM CHLORIDE 125 ML/HR: 0.9 INJECTION, SOLUTION INTRAVENOUS at 11:05

## 2022-05-15 RX ADMIN — LACOSAMIDE 100 MG: 50 TABLET, FILM COATED ORAL at 11:05

## 2022-05-15 RX ADMIN — APIXABAN 5 MG: 5 TABLET, FILM COATED ORAL at 11:05

## 2022-05-15 RX ADMIN — INSULIN HUMAN 3 UNITS/HR: 1 INJECTION, SOLUTION INTRAVENOUS at 07:05

## 2022-05-15 NOTE — ED NOTES
iSTAT Chem 8    Na+ 133   K+ 5.3   Cl 96   iCa 1.20   TCO2 16   Glu 700   BUN 38   Creat 2.3   Hct% 47

## 2022-05-15 NOTE — ED NOTES
Pt care assumed at this time. Patient resting in stretcher and is in NAD at this time. Pt is awake alert and oriented x4, VSS, respirations even and unlabored. Pt updated on plan of care. Bed low and locked with side rails up x2, call bell in pt reach. Pt voices no needs at this time, MD at bedside.  Will continue to monitor.    LOC: The patient is awake, alert, and oriented to self, place, time, and situation. Pt is calm and cooperative. Affect is appropriate.  Speech is appropriate and clear.     APPEARANCE: Patient resting comfortably in no acute distress.  Patient is clean and well groomed.    SKIN: The skin is warm and dry; color consistent with ethnicity.  Patient has normal skin turgor and moist mucus membranes.     MUSCULOSKELETAL: Patient moving bilateral lower extremities with difficulty; denies pain in the extremities or back.  Reports weakness.     RESPIRATORY: Airway is open and patent. Respirations spontaneous, even, easy, and non-labored.  Patient has a normal effort and rate.  No accessory muscle use noted. Denies cough.     CARDIAC:  Normal rate noted.  No peripheral edema noted. No complaints of chest pain.      ABDOMEN: Soft and non tender to palpation.  No distention noted. Pt denies abdominal pain; reports nausea, vomiting. Reports constipation.     NEUROLOGIC: Eyes open spontaneously.  Behavior appropriate to situation.  Follows commands; facial expression symmetrical.  Purposeful motor response noted; normal sensation in all extremities. Pt reports headache at base of skull; denies lightheadedness or dizziness; denies visual disturbances; denies loss of balance; denies unilateral weakness.

## 2022-05-15 NOTE — ED PROVIDER NOTES
Encounter Date: 5/15/2022       History     Chief Complaint   Patient presents with    Hyperglycemia     Pt brought in by EMS from [facility]. Reporting no BM for 4 days and EMS reports CBG HIGH.      Mr. Kamar Muñoz is a 76M with mhx  of HTN, Type 2 DM, CAD, STEMI, HLD, paroxysmal Afib, CVA, seizures and recurrent DKA who presented to emergency department via EMS from nursing [place] over concerns of elevated glucose.  Per EMS patient's glucose was greater than 500.  Patient is at bedside alert and oriented is able to provide mild history.  Patient reports that he was here in the emergency department yesterday over constipation was discharged last night.  He states that nursing home gives his insulin.  He denies any nausea, vomiting, diarrhea, constipation, fever, shortness of breath, chest pain at this time.        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 8/2/2012    STEMI involving right coronary artery 1/9/2022    Type II or unspecified type diabetes mellitus with neurological manifestations, uncontrolled(250.62) 3/8/2013    Urinary tract infection      Past Surgical History:   Procedure Laterality Date    BACK SURGERY      CATARACT EXTRACTION W/  INTRAOCULAR LENS IMPLANT Right     Per Dr Romero note 11/2018    COLONOSCOPY N/A 1/28/2019    Procedure: COLONOSCOPY Suprep;  Surgeon: Anh Johnson MD;  Location: [facility];  Service: Endoscopy;  Laterality: N/A;    EYE SURGERY      HERNIA REPAIR      LEFT HEART CATHETERIZATION Left 1/9/2022    Procedure: CATHETERIZATION,  HEART, LEFT;  Surgeon: Will Hurst III, MD;  Location: [facility] CATH LAB/EP;  Service: Cardiology;  Laterality: Left;    renal stones      SHOULDER OPEN ROTATOR CUFF REPAIR       Family History   Problem Relation Age of Onset    Diabetes Father     Prostate cancer Neg Hx     Kidney disease Neg Hx      Social History     Tobacco Use    Smoking status: Former Smoker     Packs/day: 1.50     Years: 25.00     Pack years: 37.50     Quit date: 1983     Years since quittin.3    Smokeless tobacco: Never Used   Substance Use Topics    Alcohol use: No    Drug use: No     Review of Systems   Constitutional: Negative for appetite change and fever.   HENT: Negative for sore throat.    Respiratory: Negative for shortness of breath.    Cardiovascular: Negative for chest pain.   Gastrointestinal: Negative for constipation, diarrhea, nausea and vomiting.   Genitourinary: Negative for dysuria.   Musculoskeletal: Negative for back pain.   Skin: Negative for rash.   Neurological: Negative for weakness, light-headedness, numbness and headaches.   Hematological: Does not bruise/bleed easily.       Physical Exam     Initial Vitals   BP Pulse Resp Temp SpO2   05/15/22 1359 05/15/22 1359 05/15/22 1359 05/15/22 1401 05/15/22 1359   (!) 100/50 62 20 98.3 °F (36.8 °C) 98 %      MAP       --                Physical Exam    Nursing note and vitals reviewed.  Constitutional: He appears well-developed and well-nourished.   HENT:   Head: Normocephalic and atraumatic.   Eyes: EOM are normal. Pupils are equal, round, and reactive to light.   Neck: Neck supple. No JVD present.   Normal range of motion.  Cardiovascular: Normal rate, regular rhythm, normal heart sounds and intact distal pulses.   Pulmonary/Chest: Breath sounds normal.   Abdominal: Abdomen is soft. Bowel sounds are normal.   Musculoskeletal:         General: Normal range of motion.      Cervical back: Normal range of motion and neck supple.     Lymphadenopathy:     He  has no cervical adenopathy.   Neurological: He is alert and oriented to person, place, and time. He has normal strength and normal reflexes. GCS score is 15. GCS eye subscore is 4. GCS verbal subscore is 5. GCS motor subscore is 6.   Skin: Skin is warm and dry. Capillary refill takes more than 3 seconds.         ED Course   Procedures  Labs Reviewed   CBC W/ AUTO DIFFERENTIAL - Abnormal; Notable for the following components:       Result Value    Hemoglobin 13.6 (*)     MCHC 30.3 (*)     RDW 14.6 (*)     Immature Granulocytes 0.7 (*)     Gran # (ANC) 8.4 (*)     Immature Grans (Abs) 0.07 (*)     Gran % 78.4 (*)     Lymph % 11.8 (*)     All other components within normal limits   COMPREHENSIVE METABOLIC PANEL - Abnormal; Notable for the following components:    Sodium 133 (*)     Potassium 5.4 (*)     Chloride 91 (*)     CO2 11 (*)     Glucose 763 (*)     BUN 38 (*)     Creatinine 2.6 (*)     Albumin 3.2 (*)     Anion Gap 31 (*)     eGFR if  26.1 (*)     eGFR if non  22.6 (*)     All other components within normal limits   URINALYSIS, REFLEX TO URINE CULTURE - Abnormal; Notable for the following components:    Appearance, UA Cloudy (*)     Glucose, UA 3+ (*)     Ketones, UA 1+ (*)     Occult Blood UA 3+ (*)     Leukocytes, UA Trace (*)     All other components within normal limits    Narrative:     Specimen Source->Urine   BETA - HYDROXYBUTYRATE, SERUM - Abnormal; Notable for the following components:    Beta-Hydroxybutyrate 6.8 (*)     All other components within normal limits   URINALYSIS MICROSCOPIC - Abnormal; Notable for the following components:    RBC, UA 92 (*)     WBC, UA 29 (*)     Yeast, UA Many (*)     Hyaline Casts, UA 3 (*)     All other components within normal limits    Narrative:     Specimen Source->Urine   POCT GLUCOSE - Abnormal; Notable for the following components:    POCT Glucose >500 (*)     All other components within normal limits   ISTAT PROCEDURE - Abnormal;  Notable for the following components:    POC PH 7.189 (*)     POC PO2 23 (*)     POC HCO3 14.9 (*)     POC SATURATED O2 28 (*)     POC TCO2 16 (*)     All other components within normal limits   CULTURE, URINE   HEPATITIS C ANTIBODY   ISTAT CHEM8   POCT GLUCOSE MONITORING CONTINUOUS     EKG Readings: (Independently Interpreted)   Initial Reading: No STEMI. Previous EKG Date: 4/12/2022. Rhythm: Normal Sinus Rhythm. Heart Rate: 61.       Imaging Results    None          Medications   sodium chloride 0.9% flush 10 mL (has no administration in time range)   dextrose 10 % infusion (has no administration in time range)   dextrose 10 % infusion (has no administration in time range)   dextrose 10% bolus 125 mL (has no administration in time range)   dextrose 10% bolus 250 mL (has no administration in time range)   insulin regular in 0.9 % NaCl 100 unit/100 mL (1 unit/mL) infusion (has no administration in time range)   insulin regular bolus from bag/infusion 7.28 Units (has no administration in time range)   ondansetron injection 4 mg (has no administration in time range)   sodium chloride 0.9% bolus 1,000 mL (0 mLs Intravenous Stopped 5/15/22 1751)   sodium chloride 0.9% bolus 1,000 mL (0 mLs Intravenous Stopped 5/15/22 1828)     Medical Decision Making:   Initial Assessment:   76-year-old male presented from nursing home over concerns hyperglycemia.  Patient is alert oriented in no acute distress.  Patient is afebrile with vitals within normal limits.  Differential Diagnosis:   DKA  HHS  Hyperkalemia  Metabolic derangement  Dehydration    Clinical Tests:   Lab Tests: Ordered and Reviewed  The following lab test(s) were unremarkable: CBC  Medical Tests: Ordered and Reviewed  ED Management:  Patient appears extremely dehydrated 2 L of fluid started.  Previous echo shows EF of 50. Patient's glucose greater than 500 on route.  I sat and VBG obtained to evaluate for acidosis, potassium.  Patient has an acidosis with a pH of  7.129 and a potassium 5.3.  CMP displayed a glucose of 735. With a an anion gap of 31. Beta hydroxybutyrate elevated.  Patient started on insulin drip and insulin bolus.  Upon reassessment patient remains stable alert oriented.  He does note mild nausea.  Patient given IV Zofran with improvement of nausea.  Discussed case with Hospital Medicine who will admit patient to step-down for diabetic ketoacidosis.              Attending Attestation:         Attending Critical Care:   Critical Care Times:   ==============================================================  · Total Critical Care Time - exclusive of procedural time: 40 minutes.  ==============================================================  Critical care was necessary to treat or prevent imminent or life-threatening deterioration of the following conditions: metabolic crisis.   The following critical care procedures were done by me (see procedure notes): pulse oximetry.   Critical care was time spent personally by me on the following activities: obtaining history from patient or relative, examination of patient, review of x-rays / CT sent with the patient, review of old charts, ordering lab, x-rays, and/or EKG, development of treatment plan with patient or relative, ordering and performing treatments and interventions, evaluation of patient's response to treatment and re-evaluation of patient's conition.   Critical Care Condition: potentially life-threatening                    Clinical Impression:   Final diagnoses:  [R73.9] Hyperglycemia  [E11.10] DKA (diabetic ketoacidosis)          ED Disposition Condition    Admit               Medardo Weaver MD  Resident  05/15/22 6844

## 2022-05-15 NOTE — ED NOTES
Patient resting in stretcher and is in NAD at this time. Pt is awake alert and oriented x4, VSS, respirations even and unlabored. Pt updated on plan of care. Bed low and locked with side rails up x2, call bell in pt reach. Pt reporting nausea - MD aware, getting nausea meds now.  Will continue to monitor.

## 2022-05-16 LAB
ANION GAP SERPL CALC-SCNC: 11 MMOL/L (ref 8–16)
ANION GAP SERPL CALC-SCNC: 11 MMOL/L (ref 8–16)
ANION GAP SERPL CALC-SCNC: 12 MMOL/L (ref 8–16)
ANION GAP SERPL CALC-SCNC: 14 MMOL/L (ref 8–16)
ANION GAP SERPL CALC-SCNC: 22 MMOL/L (ref 8–16)
BACTERIA UR CULT: NORMAL
BASOPHILS # BLD AUTO: 0.02 K/UL (ref 0–0.2)
BASOPHILS NFR BLD: 0.2 % (ref 0–1.9)
BUN SERPL-MCNC: 28 MG/DL (ref 8–23)
BUN SERPL-MCNC: 32 MG/DL (ref 8–23)
BUN SERPL-MCNC: 33 MG/DL (ref 8–23)
BUN SERPL-MCNC: 34 MG/DL (ref 8–23)
BUN SERPL-MCNC: 37 MG/DL (ref 8–23)
CALCIUM SERPL-MCNC: 8.2 MG/DL (ref 8.7–10.5)
CALCIUM SERPL-MCNC: 8.9 MG/DL (ref 8.7–10.5)
CALCIUM SERPL-MCNC: 9 MG/DL (ref 8.7–10.5)
CALCIUM SERPL-MCNC: 9 MG/DL (ref 8.7–10.5)
CALCIUM SERPL-MCNC: 9.1 MG/DL (ref 8.7–10.5)
CHLORIDE SERPL-SCNC: 101 MMOL/L (ref 95–110)
CHLORIDE SERPL-SCNC: 101 MMOL/L (ref 95–110)
CHLORIDE SERPL-SCNC: 105 MMOL/L (ref 95–110)
CHLORIDE SERPL-SCNC: 105 MMOL/L (ref 95–110)
CHLORIDE SERPL-SCNC: 106 MMOL/L (ref 95–110)
CO2 SERPL-SCNC: 15 MMOL/L (ref 23–29)
CO2 SERPL-SCNC: 22 MMOL/L (ref 23–29)
CO2 SERPL-SCNC: 23 MMOL/L (ref 23–29)
CO2 SERPL-SCNC: 24 MMOL/L (ref 23–29)
CO2 SERPL-SCNC: 24 MMOL/L (ref 23–29)
CREAT SERPL-MCNC: 1.6 MG/DL (ref 0.5–1.4)
CREAT SERPL-MCNC: 1.8 MG/DL (ref 0.5–1.4)
CREAT SERPL-MCNC: 1.8 MG/DL (ref 0.5–1.4)
CREAT SERPL-MCNC: 2 MG/DL (ref 0.5–1.4)
CREAT SERPL-MCNC: 2.6 MG/DL (ref 0.5–1.4)
DIFFERENTIAL METHOD: ABNORMAL
EOSINOPHIL # BLD AUTO: 0 K/UL (ref 0–0.5)
EOSINOPHIL NFR BLD: 0.3 % (ref 0–8)
ERYTHROCYTE [DISTWIDTH] IN BLOOD BY AUTOMATED COUNT: 14.2 % (ref 11.5–14.5)
EST. GFR  (AFRICAN AMERICAN): 26.1 ML/MIN/1.73 M^2
EST. GFR  (AFRICAN AMERICAN): 35.9 ML/MIN/1.73 M^2
EST. GFR  (AFRICAN AMERICAN): 40.8 ML/MIN/1.73 M^2
EST. GFR  (AFRICAN AMERICAN): 40.8 ML/MIN/1.73 M^2
EST. GFR  (AFRICAN AMERICAN): 47 ML/MIN/1.73 M^2
EST. GFR  (NON AFRICAN AMERICAN): 22.6 ML/MIN/1.73 M^2
EST. GFR  (NON AFRICAN AMERICAN): 31 ML/MIN/1.73 M^2
EST. GFR  (NON AFRICAN AMERICAN): 35.3 ML/MIN/1.73 M^2
EST. GFR  (NON AFRICAN AMERICAN): 35.3 ML/MIN/1.73 M^2
EST. GFR  (NON AFRICAN AMERICAN): 40.7 ML/MIN/1.73 M^2
GLUCOSE SERPL-MCNC: 253 MG/DL (ref 70–110)
GLUCOSE SERPL-MCNC: 258 MG/DL (ref 70–110)
GLUCOSE SERPL-MCNC: 357 MG/DL (ref 70–110)
GLUCOSE SERPL-MCNC: 412 MG/DL (ref 70–110)
GLUCOSE SERPL-MCNC: 523 MG/DL (ref 70–110)
GLUCOSE SERPL-MCNC: 523 MG/DL (ref 70–110)
HCT VFR BLD AUTO: 38.3 % (ref 40–54)
HGB BLD-MCNC: 12.4 G/DL (ref 14–18)
IMM GRANULOCYTES # BLD AUTO: 0.05 K/UL (ref 0–0.04)
IMM GRANULOCYTES NFR BLD AUTO: 0.4 % (ref 0–0.5)
LYMPHOCYTES # BLD AUTO: 1.9 K/UL (ref 1–4.8)
LYMPHOCYTES NFR BLD: 15.2 % (ref 18–48)
MCH RBC QN AUTO: 29.2 PG (ref 27–31)
MCHC RBC AUTO-ENTMCNC: 32.4 G/DL (ref 32–36)
MCV RBC AUTO: 90 FL (ref 82–98)
MONOCYTES # BLD AUTO: 1.8 K/UL (ref 0.3–1)
MONOCYTES NFR BLD: 14.2 % (ref 4–15)
NEUTROPHILS # BLD AUTO: 8.7 K/UL (ref 1.8–7.7)
NEUTROPHILS NFR BLD: 69.7 % (ref 38–73)
NRBC BLD-RTO: 0 /100 WBC
PHOSPHATE SERPL-MCNC: 4.1 MG/DL (ref 2.7–4.5)
PLATELET # BLD AUTO: 234 K/UL (ref 150–450)
PMV BLD AUTO: 9.5 FL (ref 9.2–12.9)
POCT GLUCOSE: 227 MG/DL (ref 70–110)
POCT GLUCOSE: 238 MG/DL (ref 70–110)
POCT GLUCOSE: 241 MG/DL (ref 70–110)
POCT GLUCOSE: 256 MG/DL (ref 70–110)
POCT GLUCOSE: 260 MG/DL (ref 70–110)
POCT GLUCOSE: 268 MG/DL (ref 70–110)
POCT GLUCOSE: 352 MG/DL (ref 70–110)
POCT GLUCOSE: 364 MG/DL (ref 70–110)
POCT GLUCOSE: 378 MG/DL (ref 70–110)
POCT GLUCOSE: 427 MG/DL (ref 70–110)
POCT GLUCOSE: 47 MG/DL (ref 70–110)
POCT GLUCOSE: >500 MG/DL (ref 70–110)
POTASSIUM SERPL-SCNC: 3.7 MMOL/L (ref 3.5–5.1)
POTASSIUM SERPL-SCNC: 4.2 MMOL/L (ref 3.5–5.1)
POTASSIUM SERPL-SCNC: 4.2 MMOL/L (ref 3.5–5.1)
POTASSIUM SERPL-SCNC: 4.4 MMOL/L (ref 3.5–5.1)
POTASSIUM SERPL-SCNC: 4.6 MMOL/L (ref 3.5–5.1)
RBC # BLD AUTO: 4.24 M/UL (ref 4.6–6.2)
SODIUM SERPL-SCNC: 136 MMOL/L (ref 136–145)
SODIUM SERPL-SCNC: 138 MMOL/L (ref 136–145)
SODIUM SERPL-SCNC: 139 MMOL/L (ref 136–145)
SODIUM SERPL-SCNC: 140 MMOL/L (ref 136–145)
SODIUM SERPL-SCNC: 143 MMOL/L (ref 136–145)
WBC # BLD AUTO: 12.5 K/UL (ref 3.9–12.7)

## 2022-05-16 PROCEDURE — 63600175 PHARM REV CODE 636 W HCPCS: Performed by: STUDENT IN AN ORGANIZED HEALTH CARE EDUCATION/TRAINING PROGRAM

## 2022-05-16 PROCEDURE — 36415 COLL VENOUS BLD VENIPUNCTURE: CPT | Performed by: HOSPITALIST

## 2022-05-16 PROCEDURE — 25000003 PHARM REV CODE 250: Performed by: HOSPITALIST

## 2022-05-16 PROCEDURE — 80048 BASIC METABOLIC PNL TOTAL CA: CPT | Mod: 91 | Performed by: HOSPITALIST

## 2022-05-16 PROCEDURE — 99222 1ST HOSP IP/OBS MODERATE 55: CPT | Mod: GC,,, | Performed by: INTERNAL MEDICINE

## 2022-05-16 PROCEDURE — 25000003 PHARM REV CODE 250: Performed by: STUDENT IN AN ORGANIZED HEALTH CARE EDUCATION/TRAINING PROGRAM

## 2022-05-16 PROCEDURE — C9399 UNCLASSIFIED DRUGS OR BIOLOG: HCPCS | Performed by: HOSPITALIST

## 2022-05-16 PROCEDURE — 85025 COMPLETE CBC W/AUTO DIFF WBC: CPT | Performed by: HOSPITALIST

## 2022-05-16 PROCEDURE — 94761 N-INVAS EAR/PLS OXIMETRY MLT: CPT

## 2022-05-16 PROCEDURE — C9399 UNCLASSIFIED DRUGS OR BIOLOG: HCPCS | Performed by: STUDENT IN AN ORGANIZED HEALTH CARE EDUCATION/TRAINING PROGRAM

## 2022-05-16 PROCEDURE — 84100 ASSAY OF PHOSPHORUS: CPT | Performed by: HOSPITALIST

## 2022-05-16 PROCEDURE — 63600175 PHARM REV CODE 636 W HCPCS: Performed by: HOSPITALIST

## 2022-05-16 PROCEDURE — 20600001 HC STEP DOWN PRIVATE ROOM

## 2022-05-16 PROCEDURE — 99233 SBSQ HOSP IP/OBS HIGH 50: CPT | Mod: ,,, | Performed by: STUDENT IN AN ORGANIZED HEALTH CARE EDUCATION/TRAINING PROGRAM

## 2022-05-16 PROCEDURE — 99233 PR SUBSEQUENT HOSPITAL CARE,LEVL III: ICD-10-PCS | Mod: ,,, | Performed by: STUDENT IN AN ORGANIZED HEALTH CARE EDUCATION/TRAINING PROGRAM

## 2022-05-16 PROCEDURE — 99222 PR INITIAL HOSPITAL CARE,LEVL II: ICD-10-PCS | Mod: GC,,, | Performed by: INTERNAL MEDICINE

## 2022-05-16 PROCEDURE — 80048 BASIC METABOLIC PNL TOTAL CA: CPT | Performed by: HOSPITALIST

## 2022-05-16 RX ORDER — INSULIN ASPART 100 [IU]/ML
0-5 INJECTION, SOLUTION INTRAVENOUS; SUBCUTANEOUS
Status: DISCONTINUED | OUTPATIENT
Start: 2022-05-16 | End: 2022-05-16

## 2022-05-16 RX ORDER — GLUCAGON 1 MG
1 KIT INJECTION
Status: DISCONTINUED | OUTPATIENT
Start: 2022-05-16 | End: 2022-05-18 | Stop reason: HOSPADM

## 2022-05-16 RX ORDER — INSULIN ASPART 100 [IU]/ML
8-16 INJECTION, SOLUTION INTRAVENOUS; SUBCUTANEOUS
Status: DISCONTINUED | OUTPATIENT
Start: 2022-05-17 | End: 2022-05-16

## 2022-05-16 RX ORDER — INSULIN ASPART 100 [IU]/ML
8-16 INJECTION, SOLUTION INTRAVENOUS; SUBCUTANEOUS
Status: DISCONTINUED | OUTPATIENT
Start: 2022-05-17 | End: 2022-05-18 | Stop reason: HOSPADM

## 2022-05-16 RX ORDER — INSULIN ASPART 100 [IU]/ML
4-10 INJECTION, SOLUTION INTRAVENOUS; SUBCUTANEOUS
Status: DISCONTINUED | OUTPATIENT
Start: 2022-05-16 | End: 2022-05-16

## 2022-05-16 RX ORDER — IBUPROFEN 200 MG
24 TABLET ORAL
Status: DISCONTINUED | OUTPATIENT
Start: 2022-05-16 | End: 2022-05-16

## 2022-05-16 RX ORDER — INSULIN ASPART 100 [IU]/ML
1-10 INJECTION, SOLUTION INTRAVENOUS; SUBCUTANEOUS
Status: DISCONTINUED | OUTPATIENT
Start: 2022-05-16 | End: 2022-05-18 | Stop reason: HOSPADM

## 2022-05-16 RX ORDER — GLUCAGON 1 MG
1 KIT INJECTION
Status: DISCONTINUED | OUTPATIENT
Start: 2022-05-16 | End: 2022-05-16

## 2022-05-16 RX ORDER — SODIUM CHLORIDE 9 MG/ML
INJECTION, SOLUTION INTRAVENOUS CONTINUOUS
Status: ACTIVE | OUTPATIENT
Start: 2022-05-16 | End: 2022-05-16

## 2022-05-16 RX ORDER — INSULIN ASPART 100 [IU]/ML
4 INJECTION, SOLUTION INTRAVENOUS; SUBCUTANEOUS
Status: DISCONTINUED | OUTPATIENT
Start: 2022-05-16 | End: 2022-05-18 | Stop reason: HOSPADM

## 2022-05-16 RX ORDER — IBUPROFEN 200 MG
16 TABLET ORAL
Status: DISCONTINUED | OUTPATIENT
Start: 2022-05-16 | End: 2022-05-16

## 2022-05-16 RX ORDER — IBUPROFEN 200 MG
24 TABLET ORAL
Status: DISCONTINUED | OUTPATIENT
Start: 2022-05-16 | End: 2022-05-18 | Stop reason: HOSPADM

## 2022-05-16 RX ORDER — INSULIN ASPART 100 [IU]/ML
5 INJECTION, SOLUTION INTRAVENOUS; SUBCUTANEOUS
Status: DISCONTINUED | OUTPATIENT
Start: 2022-05-16 | End: 2022-05-16

## 2022-05-16 RX ORDER — IBUPROFEN 200 MG
16 TABLET ORAL
Status: DISCONTINUED | OUTPATIENT
Start: 2022-05-16 | End: 2022-05-18 | Stop reason: HOSPADM

## 2022-05-16 RX ADMIN — AMIODARONE HYDROCHLORIDE 200 MG: 200 TABLET ORAL at 08:05

## 2022-05-16 RX ADMIN — ATORVASTATIN CALCIUM 40 MG: 20 TABLET, FILM COATED ORAL at 08:05

## 2022-05-16 RX ADMIN — APIXABAN 5 MG: 5 TABLET, FILM COATED ORAL at 09:05

## 2022-05-16 RX ADMIN — INSULIN ASPART 10 UNITS: 100 INJECTION, SOLUTION INTRAVENOUS; SUBCUTANEOUS at 04:05

## 2022-05-16 RX ADMIN — PANTOPRAZOLE SODIUM 40 MG: 40 TABLET, DELAYED RELEASE ORAL at 08:05

## 2022-05-16 RX ADMIN — LACOSAMIDE 100 MG: 50 TABLET, FILM COATED ORAL at 08:05

## 2022-05-16 RX ADMIN — APIXABAN 5 MG: 5 TABLET, FILM COATED ORAL at 08:05

## 2022-05-16 RX ADMIN — METOPROLOL SUCCINATE 50 MG: 50 TABLET, EXTENDED RELEASE ORAL at 08:05

## 2022-05-16 RX ADMIN — SERTRALINE HYDROCHLORIDE 25 MG: 25 TABLET ORAL at 08:05

## 2022-05-16 RX ADMIN — INSULIN ASPART 5 UNITS: 100 INJECTION, SOLUTION INTRAVENOUS; SUBCUTANEOUS at 01:05

## 2022-05-16 RX ADMIN — INSULIN DETEMIR 6 UNITS: 100 INJECTION, SOLUTION SUBCUTANEOUS at 08:05

## 2022-05-16 RX ADMIN — INSULIN DETEMIR 6 UNITS: 100 INJECTION, SOLUTION SUBCUTANEOUS at 09:05

## 2022-05-16 RX ADMIN — DEXTROSE 250 ML: 10 SOLUTION INTRAVENOUS at 05:05

## 2022-05-16 RX ADMIN — INSULIN ASPART 3 UNITS: 100 INJECTION, SOLUTION INTRAVENOUS; SUBCUTANEOUS at 08:05

## 2022-05-16 RX ADMIN — CLOPIDOGREL 75 MG: 75 TABLET, FILM COATED ORAL at 08:05

## 2022-05-16 RX ADMIN — SODIUM CHLORIDE: 0.9 INJECTION, SOLUTION INTRAVENOUS at 06:05

## 2022-05-16 RX ADMIN — INSULIN ASPART 3 UNITS: 100 INJECTION, SOLUTION INTRAVENOUS; SUBCUTANEOUS at 01:05

## 2022-05-16 RX ADMIN — LACOSAMIDE 100 MG: 50 TABLET, FILM COATED ORAL at 09:05

## 2022-05-16 RX ADMIN — TAMSULOSIN HYDROCHLORIDE 0.4 MG: 0.4 CAPSULE ORAL at 08:05

## 2022-05-16 RX ADMIN — INSULIN ASPART 5 UNITS: 100 INJECTION, SOLUTION INTRAVENOUS; SUBCUTANEOUS at 04:05

## 2022-05-16 RX ADMIN — INSULIN ASPART 5 UNITS: 100 INJECTION, SOLUTION INTRAVENOUS; SUBCUTANEOUS at 08:05

## 2022-05-16 RX ADMIN — INSULIN ASPART 2 UNITS: 100 INJECTION, SOLUTION INTRAVENOUS; SUBCUTANEOUS at 09:05

## 2022-05-16 NOTE — H&P
[facility] - Emergency Dept  [facility] Medicine  History & Physical    Patient Name: Kamar Muñoz  MRN: [id number]  Patient Class: IP- Inpatient  Admission Date: 5/15/2022  Attending Physician: Minnie Collazo MD   Primary Care Provider: Basim Guerrero MD         Patient information was obtained from patient, past medical records and ER records.     Subjective:     Principal Problem:Diabetic ketoacidosis without coma associated with type 2 diabetes mellitus    Chief Complaint:   Chief Complaint   Patient presents with    Hyperglycemia     Pt brought in by EMS from [facility]. Reporting no BM for 4 days and EMS reports CBG HIGH.         HPI: 77 yo M with PMHx poorly controlled DM2 with recurrent DKA, CAD, HLD, paroxysmal Afib, and seizures who presents to the ED from NH due to elevated BG. Pt. Was seen in ED yesterday/last night for constipation and BRBPR which was thought to be 2/2 to straining. Pt. Was discharged back to NH early this morning. Today at NH, pt. BG was noted to be >500, so the patient was sent back to the ED. Pt. Was seen in ED ~11pm last night and discharged back to NH at 6am this morning. It is unclear if he missed his basal insulin at NH (6 units levemir BID) last night prior to coming to the ED or this morning after returning to NH. At time of my interview, pt. Is resting comfortably on insulin gtt. Pt. Does complain of some nausea and mild abdominal cramps. No reported fevers, chills, cough. Pt. Was given a brown bomb enema last night with large stool output and has had one more BM today.      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    Hyperlipidemia     Hypertension     Kidney stone     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 8/2/2012    STEMI involving right coronary artery 1/9/2022    Type II or  "unspecified type diabetes mellitus with neurological manifestations, uncontrolled(250.62) 3/8/2013    Urinary tract infection        Past Surgical History:   Procedure Laterality Date    BACK SURGERY      CATARACT EXTRACTION W/  INTRAOCULAR LENS IMPLANT Right     Per Dr Romero note 11/2018    COLONOSCOPY N/A 1/28/2019    Procedure: COLONOSCOPY Suprep;  Surgeon: Anh Johnson MD;  Location: [facility] ENDO;  Service: Endoscopy;  Laterality: N/A;    EYE SURGERY      HERNIA REPAIR      LEFT HEART CATHETERIZATION Left 1/9/2022    Procedure: CATHETERIZATION, HEART, LEFT;  Surgeon: Will Hurst III, MD;  Location: [facility] CATH LAB/EP;  Service: Cardiology;  Laterality: Left;    renal stones      SHOULDER OPEN ROTATOR CUFF REPAIR         Review of patient's allergies indicates:   Allergen Reactions    Iodine      Other reaction(s): swelling  Other reaction(s): Itching  Other reaction(s): Rash       No current facility-administered medications on file prior to encounter.     Current Outpatient Medications on File Prior to Encounter   Medication Sig    amiodarone (PACERONE) 200 MG Tab Take 1 tablet (200 mg total) by mouth once daily.    apixaban (ELIQUIS) 5 mg Tab Take 1 tablet (5 mg total) by mouth 2 (two) times daily.    atorvastatin (LIPITOR) 40 MG tablet Take 1 tablet (40 mg total) by mouth once daily.    BD ULTRA-FINE SHORT PEN NEEDLE 31 gauge x 5/16" Ndle 3 (three) times daily.    blood sugar diagnostic Strp 1 strip by Misc.(Non-Drug; Combo Route) route 2 (two) times a day.    blood-glucose meter,continuous (DEXCOM G5 ) Misc 1 Device by Misc.(Non-Drug; Combo Route) route once daily at 6am.    blood-glucose transmitter (DEXCOM G5 TRANSMITTER) Stephanie 1 Device by Misc.(Non-Drug; Combo Route) route once daily at 6am.    cetirizine (ZYRTEC) 10 MG tablet Take 1 tablet (10 mg total) by mouth every evening.    clopidogreL (PLAVIX) 75 mg tablet Take 1 tablet (75 mg total) by mouth once daily.    " diclofenac sodium (VOLTAREN) 1 % Gel Apply 4 g topically 3 (three) times daily. Apply to sacrun closed skin/buttocks    ergocalciferol (ERGOCALCIFEROL) 50,000 unit Cap Take 1 capsule (50,000 Units total) by mouth every 7 days.    glucose 4 GM chewable tablet Take 4 tablets (16 g total) by mouth as needed for Low blood sugar (50 - 70).    glucose 4 GM chewable tablet Take 6 tablets (24 g total) by mouth as needed for Low blood sugar (< 50).    insulin aspart U-100 (NOVOLOG) 100 unit/mL (3 mL) InPn pen Inject 4 Units into the skin with snacks (>200).    insulin aspart U-100 (NOVOLOG) 100 unit/mL (3 mL) InPn pen Inject 1-10 Units into the skin before meals and at bedtime as needed (Hyperglycemia). **MODERATE CORRECTION DOSE**  Blood Glucose  mg/dL                  Pre-meal                2200  151-200                2 units                    1 unit  201-250                4 units                    3 units    251-300                6 units                    4 units    301-350                8 units                    5 units   >350                     10 units                  6 units  Administer subcutaneously if needed at times designated by monitoring schedule.    insulin aspart U-100 (NOVOLOG) 100 unit/mL (3 mL) InPn pen Inject 9-18 Units into the skin daily with breakfast. Give 9 units if eats <50% of meal, give full dose of 18 units if eats >50% of meal    Administer subcutaneously with meal. HOLD prandial (mealtime) insulin if patient is NPO, unable to eat, or if Blood Glucose less than 70 mg/dL.    If patient ate prior to BG check, administer scheduled Novolog only (do not cover with correction dose at this time).    insulin aspart U-100 (NOVOLOG) 100 unit/mL (3 mL) InPn pen Inject 9-18 Units into the skin with lunch. Give 9 units if eats <50% of meal, give full dose of 18 units if eats >50% of meal    Administer subcutaneously with meal. HOLD prandial (mealtime) insulin if patient is NPO, unable to  "eat, or if Blood Glucose less than 70 mg/dL.    If patient ate prior to BG check, administer scheduled Novolog only (do not cover with correction dose at this time).    insulin aspart U-100 (NOVOLOG) 100 unit/mL (3 mL) InPn pen Inject 6-12 Units into the skin daily with dinner or evening meal. Give 6 units if patient eats <50% of meal, give full dose 12 units if eats more than 50% of meal.    Administer subcutaneously with meal. HOLD prandial (mealtime) insulin if patient is NPO,  unable to eat, or if Blood Glucose less than 70 mg/dL.    If patient ate prior to BG check, administer scheduled Novolog only (do not cover with correction dose at this time).    insulin glargine (LANTUS) 100 unit/mL injection Inject 6 Units into the skin 2 (two) times a day.    lacosamide (VIMPAT) 100 mg Tab Take 1 tablet (100 mg total) by mouth every 12 (twelve) hours.    lancets (ONETOUCH ULTRASOFT LANCETS) Misc 1 lancet by Misc.(Non-Drug; Combo Route) route 2 (two) times a day.    losartan (COZAAR) 25 MG tablet Take 1 tablet (25 mg total) by mouth once daily.    magnesium oxide (MAG-OX) 400 mg (241.3 mg magnesium) tablet Take 1 tablet (400 mg total) by mouth 2 (two) times daily.    metoprolol succinate (TOPROL-XL) 50 MG 24 hr tablet Take 1 tablet (50 mg total) by mouth once daily.    MULTIVITAMIN ORAL Take 1 tablet by mouth once daily.     nitroGLYCERIN (NITROSTAT) 0.4 MG SL tablet Place 1 tablet (0.4 mg total) under the tongue every 5 (five) minutes as needed for Chest pain.    ondansetron (ZOFRAN-ODT) 4 MG TbDL Take 1 tablet (4 mg total) by mouth 3 (three) times daily with meals.    pantoprazole (PROTONIX) 40 MG tablet Take 1 tablet (40 mg total) by mouth once daily.    pen needle, diabetic (PEN NEEDLE) 30 gauge x 5/16" Ndle 1 Units by Misc.(Non-Drug; Combo Route) route 3 (three) times daily.    polycarbophil (FIBERCON) 625 mg tablet Take 1 tablet by mouth once daily.     senna-docusate 8.6-50 mg (PERICOLACE) 8.6-50 mg " per tablet Take 1 tablet by mouth once daily.    sertraline (ZOLOFT) 25 MG tablet Take 1 tablet (25 mg total) by mouth once daily.    sucralfate (CARAFATE) 1 gram tablet Take 1 tablet (1 g total) by mouth 3 (three) times daily before meals.    tamsulosin (FLOMAX) 0.4 mg Cap Take 1 capsule (0.4 mg total) by mouth once daily.    [DISCONTINUED] albuterol (PROVENTIL/VENTOLIN HFA) 90 mcg/actuation inhaler Inhale 2 puffs into the lungs every 4 (four) hours as needed for Wheezing (Pt states he will take it on his own. MDI placed by bedside.). Rescue    [DISCONTINUED] aspirin (ECOTRIN) 81 MG EC tablet Take 81 mg by mouth once daily.    [DISCONTINUED] diltiaZEM (CARDIZEM CD) 120 MG Cp24 Take 1 capsule (120 mg total) by mouth once daily.    [DISCONTINUED] gabapentin (NEURONTIN) 100 MG capsule Take 2 capsules (200 mg total) by mouth 3 (three) times daily.    [DISCONTINUED] metFORMIN (GLUCOPHAGE) 1000 MG tablet Take 1 tablet (1,000 mg total) by mouth 2 (two) times daily with meals.     Family History       Problem Relation (Age of Onset)    Diabetes Father          Tobacco Use    Smoking status: Former Smoker     Packs/day: 1.50     Years: 25.00     Pack years: 37.50     Quit date: 1983     Years since quittin.3    Smokeless tobacco: Never Used   Substance and Sexual Activity    Alcohol use: No    Drug use: No    Sexual activity: Yes     Partners: Female     Review of Systems   Constitutional:  Negative for activity change, chills, fever and unexpected weight change.   HENT:  Negative for congestion and sore throat.    Respiratory:  Negative for cough, shortness of breath and wheezing.    Cardiovascular:  Negative for chest pain, palpitations and leg swelling.   Gastrointestinal:  Positive for blood in stool and constipation. Negative for abdominal pain, nausea and vomiting.   Genitourinary:  Negative for dysuria and hematuria.   Musculoskeletal:  Negative for arthralgias and neck pain.   Skin:  Negative  for color change and rash.   Neurological:  Negative for dizziness, seizures and numbness.   Psychiatric/Behavioral:  Negative for hallucinations and suicidal ideas.    Objective:     Vital Signs (Most Recent):  Temp: 98 °F (36.7 °C) (05/15/22 1803)  Pulse: 70 (05/15/22 2025)  Resp: 20 (05/15/22 2025)  BP: (!) 144/65 (05/15/22 2003)  SpO2: 97 % (05/15/22 2025)   Vital Signs (24h Range):  Temp:  [98 °F (36.7 °C)-98.3 °F (36.8 °C)] 98 °F (36.7 °C)  Pulse:  [57-75] 70  Resp:  [18-20] 20  SpO2:  [97 %-100 %] 97 %  BP: (100-144)/(50-88) 144/65     Weight: 72.8 kg (160 lb 6.4 oz)  Body mass index is 22.37 kg/m².    Physical Exam  Vitals reviewed.   Constitutional:       General: He is not in acute distress.     Appearance: He is well-developed.   HENT:      Head: Normocephalic and atraumatic.   Eyes:      Extraocular Movements: Extraocular movements intact.      Pupils: Pupils are equal, round, and reactive to light.   Neck:      Vascular: No JVD.      Trachea: No tracheal deviation.   Cardiovascular:      Rate and Rhythm: Normal rate and regular rhythm.      Heart sounds: No murmur heard.    No friction rub. No gallop.   Pulmonary:      Effort: No respiratory distress.      Breath sounds: Normal breath sounds. No wheezing or rales.   Abdominal:      General: Bowel sounds are normal. There is no distension.      Palpations: Abdomen is soft. There is no mass.      Tenderness: There is no abdominal tenderness.   Musculoskeletal:         General: No deformity.      Cervical back: Neck supple.   Lymphadenopathy:      Cervical: No cervical adenopathy.   Skin:     General: Skin is warm and dry.      Findings: No rash.   Neurological:      Mental Status: He is alert and oriented to person, place, and time.         CRANIAL NERVES     CN III, IV, VI   Pupils are equal, round, and reactive to light.     Significant Labs: All pertinent labs within the past 24 hours have been reviewed.    Significant Imaging: I have reviewed all  pertinent imaging results/findings within the past 24 hours.    Assessment/Plan:     * Diabetic ketoacidosis without coma associated with type 2 diabetes mellitus  - with bicarb 11, anion gap 31 and pH 7.189 indicative of DKA  -Suspect underlying etiology missed insulin doses due to moving from NH to ED and back without timely med administration  -DKA pathway ordered with insulin bolus followed by insulin gtt atrting at 0.1 unit/kg  -Endocrine consulted for assistance due to pt. Brittle diabetes with recurrent DKA      Focal seizures  -Hx of seizures, continue home locosamide      Coronary artery disease  -No reported CXR, EKG wtihout new ST changes  -Continue home plavix, eliquis, and statin      Paroxysmal atrial fibrillation  -Pt. In NSR on admit  -Continue home amiodarone and toprol-XL for rate control and eliquis for AC        Essential hypertension  -Continue home metoprolol, holding losartan in setting of BRENDAN        VTE Risk Mitigation (From admission, onward)         Ordered     apixaban tablet 5 mg  2 times daily         05/15/22 2104     IP VTE HIGH RISK PATIENT  Once         05/15/22 2104     Place sequential compression device  Until discontinued         05/15/22 2104     Place sequential compression device  Until discontinued         05/15/22 1652                   Danny Ramírez MD  Department of Hospital Medicine   [facility] - [facility] Dept

## 2022-05-16 NOTE — HPI
Reason for Consult: Management of Diabetic Ketoacidosis     Diabetes diagnosis year: more than 20 years ago, with multiple episodes of Diabetic Ketoacidosis    Home Diabetes Medications:  He was recently admitted to the hospital for Diabetic Ketoacidosis and discharged on 5.03.22 with instructions that fixed doses of insulin recommended are to cover meals containing 60 g of carbohydrate and it is very important that meals are consistently containing 60 g of carbohydrates. His regime was as following:  -  Levemir 6 units BID   -  Aspart 18 units BF (give 9 units if eats <50% and full dose if eats >50%), 18 units L (give 9 units if eats <50% and full dose if eats >50%), 12 units D (give 6 units if eats <50% and full dose if eats >50%), in addition to moderate correction scale with with meals  -  Aspart 4 units PRN in place for nightly and between-meal snacks  -  Carb Ratio instead of Carb Counting due to dietary indiscretion and variable intake and Boosts with meals    Diabetes Complications include:     Hyperglycemia, Hypoglycemia, Diabetic peripheral neuropathy, Dyslipidemia and Cerebrovascular Accident    HPI:   He presents to the ED from nursing home due to elevated blood glucose.   She was seen in ED a day before admission for constipation and BRBPR which was thought to be due to straining and was discharged back to nursing home  Now he comes back from the nursing home after his glucose was noted to be > 500 mg/dl. He currently has nausea, polyuria and some abdominal discomfort  It is unclear if he missed his insulin the night prior to coming to the ED or the morning after returning to NH. He does not remember getting any insulin and can not recall when was the last dose of insulin given to him at nursing home.   Patient was found to be in Diabetic Ketoacidosis in the ER and was started on Insulin drip.

## 2022-05-16 NOTE — PROGRESS NOTES
[facility] - Telemetry [facility] Medicine  Progress Note    Patient Name: Kamar Muñoz  MRN: [id number]  Patient Class: IP- Inpatient   Admission Date: 5/15/2022  Length of Stay: 1 days  Attending Physician: Minnie Collazo MD  Primary Care Provider: Basim Guerrero MD        Subjective:     Principal Problem:Diabetic ketoacidosis without coma associated with type 2 diabetes mellitus        HPI:  77 yo M with PMHx poorly controlled DM2 with recurrent DKA, CAD, HLD, paroxysmal Afib, and seizures who presents to the ED from NH due to elevated BG. Pt. Was seen in ED yesterday/last night for constipation and BRBPR which was thought to be 2/2 to straining. Pt. Was discharged back to NH early this morning. Today at NH, pt. BG was noted to be >500, so the patient was sent back to the ED. Pt. Was seen in ED ~11pm last night and discharged back to NH at 6am this morning. It is unclear if he missed his basal insulin at NH (6 units levemir BID) last night prior to coming to the ED or this morning after returning to NH. At time of my interview, pt. Is resting comfortably on insulin gtt. Pt. Does complain of some nausea and mild abdominal cramps. No reported fevers, chills, cough. Pt. Was given a brown bomb enema last night with large stool output and has had one more BM today.      Overview/Hospital Course:  Patient admitted for DKA.  Was quickly transitioned off of insulin drip and now on subcu insulin with anion gap closed.  Doing well on appetite increasing however still complaining of some nausea.  Endocrine following appreciate recs      Interval History:  Patient doing well.  Advanced to diabetic diet.  Insulin drip stopped.  Still with some nausea but no emesis      Objective:     Vital Signs (Most Recent):  Temp: 98.5 °F (36.9 °C) (05/16/22 1527)  Pulse: 66 (05/16/22 1527)  Resp: 18 (05/16/22 1527)  BP: (!) 148/66 (05/16/22 1527)  SpO2: (!) 91 % (05/16/22 1527)   Vital Signs (24h Range):  Temp:  [97 °F  (36.1 °C)-98.5 °F (36.9 °C)] 98.5 °F (36.9 °C)  Pulse:  [58-75] 66  Resp:  [17-20] 18  SpO2:  [91 %-99 %] 91 %  BP: (113-163)/(56-88) 148/66     Weight: 71.4 kg (157 lb 6.4 oz)  Body mass index is 20.21 kg/m².    Intake/Output Summary (Last 24 hours) at 5/16/2022 1729  Last data filed at 5/16/2022 1300  Gross per 24 hour   Intake 2480 ml   Output --   Net 2480 ml      Physical Exam  GENERAL:  No apparent distress. Alert and oriented times three  EYES:  EOMI. Conjunctivae intact  ENT:  Posterior pharynx clear, dry mucous membranes  NECK:  Supple with no lymphadenopathy or thyromegaly  LUNGS:  No respiratory distress. Clear to auscultation bilaterally with good air movement  CARDIAC:  RRR without murmur, rub or gallop  ABDOMEN:  Nontender and nondistended. No organomegaly  EXTREMITIES:  Peripheral pulses are 2+. Hands and feet are warm.  SKIN:  Skin color, texture and turgor normal. No rashes, ulcerations or nodules      Pertinent labs and imaging reviewed      Assessment/Plan:      * Diabetic ketoacidosis without coma associated with type 2 diabetes mellitus  - with bicarb 11, anion gap 31 and pH 7.189 indicative of DKA on admission, most recent a1c-9.7%  -Suspect underlying etiology missed insulin doses due to moving from NH to ED and back without timely med administration  -DKA pathway ordered with insulin bolus followed by insulin gtt atrting at 0.1 unit/kg  -Endocrine consulted for assistance due to pt. Brittle diabetes with recurrent DKA    -now anion gap closed and electrolytes stable  -insulin drip discontinued  -currently on 6 of detemir b.i.d., and aspart t.i.d. a.c. for to 10 units  -appreciate endocrine recs      Focal seizures  -Hx of seizures, continue home locosamide      Coronary artery disease  -No reported CXR, EKG wtihout new ST changes  -Continue home plavix, eliquis, and statin      Paroxysmal atrial fibrillation  -Pt. In NSR on admit  -Continue home amiodarone and toprol-XL for rate control  and eliquis for AC        Essential hypertension  -Continue home metoprolol, holding losartan in setting of BRENDAN        VTE Risk Mitigation (From admission, onward)         Ordered     apixaban tablet 5 mg  2 times daily         05/15/22 2104     IP VTE HIGH RISK PATIENT  Once         05/15/22 2104     Place sequential compression device  Until discontinued         05/15/22 2104     Place sequential compression device  Until discontinued         05/15/22 1652                Discharge Planning   ALLIE: 5/19/2022     Code Status: DNR   Is the patient medically ready for discharge?:     Reason for patient still in hospital (select all that apply): Treatment  Discharge Plan A: Return to nursing home                  Minnie Collazo MD  Department of Hospital Medicine   [facility] - Telemetry Stepdown

## 2022-05-16 NOTE — SUBJECTIVE & OBJECTIVE
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 8/2/2012    STEMI involving right coronary artery 1/9/2022    Type II or unspecified type diabetes mellitus with neurological manifestations, uncontrolled(250.62) 3/8/2013    Urinary tract infection        Past Surgical History:   Procedure Laterality Date    BACK SURGERY      CATARACT EXTRACTION W/  INTRAOCULAR LENS IMPLANT Right     Per Dr Romero note 11/2018    COLONOSCOPY N/A 1/28/2019    Procedure: COLONOSCOPY Suprep;  Surgeon: Anh Johnson MD;  Location: [facility] ENDO;  Service: Endoscopy;  Laterality: N/A;    EYE SURGERY      HERNIA REPAIR      LEFT HEART CATHETERIZATION Left 1/9/2022    Procedure: CATHETERIZATION, HEART, LEFT;  Surgeon: Will Hurst III, MD;  Location: [facility] CATH LAB/EP;  Service: Cardiology;  Laterality: Left;    renal stones      SHOULDER OPEN ROTATOR CUFF REPAIR         Review of patient's allergies indicates:   Allergen Reactions    Iodine      Other reaction(s): swelling  Other reaction(s): Itching  Other reaction(s): Rash       No current facility-administered medications on file prior to encounter.     Current Outpatient Medications on File Prior to Encounter   Medication Sig    amiodarone (PACERONE) 200 MG Tab Take 1 tablet (200 mg total) by mouth once daily.    apixaban (ELIQUIS) 5 mg Tab Take 1 tablet (5 mg total) by mouth 2 (two) times daily.    atorvastatin (LIPITOR) 40 MG tablet Take 1 tablet (40 mg total) by mouth once daily.    BD ULTRA-FINE SHORT PEN NEEDLE 31 gauge x 5/16" Ndle 3 (three) times daily.    blood sugar diagnostic Strp 1 strip by Misc.(Non-Drug; Combo Route) route 2 (two) times a day.    blood-glucose meter,continuous (DEXCOM G5 ) Misc 1 Device by " Misc.(Non-Drug; Combo Route) route once daily at 6am.    blood-glucose transmitter (DEXCOM G5 TRANSMITTER) Stephanie 1 Device by Misc.(Non-Drug; Combo Route) route once daily at 6am.    cetirizine (ZYRTEC) 10 MG tablet Take 1 tablet (10 mg total) by mouth every evening.    clopidogreL (PLAVIX) 75 mg tablet Take 1 tablet (75 mg total) by mouth once daily.    diclofenac sodium (VOLTAREN) 1 % Gel Apply 4 g topically 3 (three) times daily. Apply to sacrun closed skin/buttocks    ergocalciferol (ERGOCALCIFEROL) 50,000 unit Cap Take 1 capsule (50,000 Units total) by mouth every 7 days.    glucose 4 GM chewable tablet Take 4 tablets (16 g total) by mouth as needed for Low blood sugar (50 - 70).    glucose 4 GM chewable tablet Take 6 tablets (24 g total) by mouth as needed for Low blood sugar (< 50).    insulin aspart U-100 (NOVOLOG) 100 unit/mL (3 mL) InPn pen Inject 4 Units into the skin with snacks (>200).    insulin aspart U-100 (NOVOLOG) 100 unit/mL (3 mL) InPn pen Inject 1-10 Units into the skin before meals and at bedtime as needed (Hyperglycemia). **MODERATE CORRECTION DOSE**  Blood Glucose  mg/dL                  Pre-meal                2200  151-200                2 units                    1 unit  201-250                4 units                    3 units    251-300                6 units                    4 units    301-350                8 units                    5 units   >350                     10 units                  6 units  Administer subcutaneously if needed at times designated by monitoring schedule.    insulin aspart U-100 (NOVOLOG) 100 unit/mL (3 mL) InPn pen Inject 9-18 Units into the skin daily with breakfast. Give 9 units if eats <50% of meal, give full dose of 18 units if eats >50% of meal    Administer subcutaneously with meal. HOLD prandial (mealtime) insulin if patient is NPO, unable to eat, or if Blood Glucose less than 70 mg/dL.    If patient ate prior to BG check, administer scheduled Novolog  only (do not cover with correction dose at this time).    insulin aspart U-100 (NOVOLOG) 100 unit/mL (3 mL) InPn pen Inject 9-18 Units into the skin with lunch. Give 9 units if eats <50% of meal, give full dose of 18 units if eats >50% of meal    Administer subcutaneously with meal. HOLD prandial (mealtime) insulin if patient is NPO, unable to eat, or if Blood Glucose less than 70 mg/dL.    If patient ate prior to BG check, administer scheduled Novolog only (do not cover with correction dose at this time).    insulin aspart U-100 (NOVOLOG) 100 unit/mL (3 mL) InPn pen Inject 6-12 Units into the skin daily with dinner or evening meal. Give 6 units if patient eats <50% of meal, give full dose 12 units if eats more than 50% of meal.    Administer subcutaneously with meal. HOLD prandial (mealtime) insulin if patient is NPO,  unable to eat, or if Blood Glucose less than 70 mg/dL.    If patient ate prior to BG check, administer scheduled Novolog only (do not cover with correction dose at this time).    insulin glargine (LANTUS) 100 unit/mL injection Inject 6 Units into the skin 2 (two) times a day.    lacosamide (VIMPAT) 100 mg Tab Take 1 tablet (100 mg total) by mouth every 12 (twelve) hours.    lancets (ONETOUCH ULTRASOFT LANCETS) Misc 1 lancet by Misc.(Non-Drug; Combo Route) route 2 (two) times a day.    losartan (COZAAR) 25 MG tablet Take 1 tablet (25 mg total) by mouth once daily.    magnesium oxide (MAG-OX) 400 mg (241.3 mg magnesium) tablet Take 1 tablet (400 mg total) by mouth 2 (two) times daily.    metoprolol succinate (TOPROL-XL) 50 MG 24 hr tablet Take 1 tablet (50 mg total) by mouth once daily.    MULTIVITAMIN ORAL Take 1 tablet by mouth once daily.     nitroGLYCERIN (NITROSTAT) 0.4 MG SL tablet Place 1 tablet (0.4 mg total) under the tongue every 5 (five) minutes as needed for Chest pain.    ondansetron (ZOFRAN-ODT) 4 MG TbDL Take 1 tablet (4 mg total) by mouth 3 (three) times daily with meals.     "pantoprazole (PROTONIX) 40 MG tablet Take 1 tablet (40 mg total) by mouth once daily.    pen needle, diabetic (PEN NEEDLE) 30 gauge x 5/16" Ndle 1 Units by Misc.(Non-Drug; Combo Route) route 3 (three) times daily.    polycarbophil (FIBERCON) 625 mg tablet Take 1 tablet by mouth once daily.     senna-docusate 8.6-50 mg (PERICOLACE) 8.6-50 mg per tablet Take 1 tablet by mouth once daily.    sertraline (ZOLOFT) 25 MG tablet Take 1 tablet (25 mg total) by mouth once daily.    sucralfate (CARAFATE) 1 gram tablet Take 1 tablet (1 g total) by mouth 3 (three) times daily before meals.    tamsulosin (FLOMAX) 0.4 mg Cap Take 1 capsule (0.4 mg total) by mouth once daily.    [DISCONTINUED] albuterol (PROVENTIL/VENTOLIN HFA) 90 mcg/actuation inhaler Inhale 2 puffs into the lungs every 4 (four) hours as needed for Wheezing (Pt states he will take it on his own. MDI placed by bedside.). Rescue    [DISCONTINUED] aspirin (ECOTRIN) 81 MG EC tablet Take 81 mg by mouth once daily.    [DISCONTINUED] diltiaZEM (CARDIZEM CD) 120 MG Cp24 Take 1 capsule (120 mg total) by mouth once daily.    [DISCONTINUED] gabapentin (NEURONTIN) 100 MG capsule Take 2 capsules (200 mg total) by mouth 3 (three) times daily.    [DISCONTINUED] metFORMIN (GLUCOPHAGE) 1000 MG tablet Take 1 tablet (1,000 mg total) by mouth 2 (two) times daily with meals.     Family History       Problem Relation (Age of Onset)    Diabetes Father          Tobacco Use    Smoking status: Former Smoker     Packs/day: 1.50     Years: 25.00     Pack years: 37.50     Quit date: 1983     Years since quittin.3    Smokeless tobacco: Never Used   Substance and Sexual Activity    Alcohol use: No    Drug use: No    Sexual activity: Yes     Partners: Female     Review of Systems   Constitutional:  Negative for activity change, chills, fever and unexpected weight change.   HENT:  Negative for congestion and sore throat.    Respiratory:  Negative for cough, shortness of breath and " wheezing.    Cardiovascular:  Negative for chest pain, palpitations and leg swelling.   Gastrointestinal:  Positive for blood in stool and constipation. Negative for abdominal pain, nausea and vomiting.   Genitourinary:  Negative for dysuria and hematuria.   Musculoskeletal:  Negative for arthralgias and neck pain.   Skin:  Negative for color change and rash.   Neurological:  Negative for dizziness, seizures and numbness.   Psychiatric/Behavioral:  Negative for hallucinations and suicidal ideas.    Objective:     Vital Signs (Most Recent):  Temp: 98 °F (36.7 °C) (05/15/22 1803)  Pulse: 70 (05/15/22 2025)  Resp: 20 (05/15/22 2025)  BP: (!) 144/65 (05/15/22 2003)  SpO2: 97 % (05/15/22 2025)   Vital Signs (24h Range):  Temp:  [98 °F (36.7 °C)-98.3 °F (36.8 °C)] 98 °F (36.7 °C)  Pulse:  [57-75] 70  Resp:  [18-20] 20  SpO2:  [97 %-100 %] 97 %  BP: (100-144)/(50-88) 144/65     Weight: 72.8 kg (160 lb 6.4 oz)  Body mass index is 22.37 kg/m².    Physical Exam  Vitals reviewed.   Constitutional:       General: He is not in acute distress.     Appearance: He is well-developed.   HENT:      Head: Normocephalic and atraumatic.   Eyes:      Extraocular Movements: Extraocular movements intact.      Pupils: Pupils are equal, round, and reactive to light.   Neck:      Vascular: No JVD.      Trachea: No tracheal deviation.   Cardiovascular:      Rate and Rhythm: Normal rate and regular rhythm.      Heart sounds: No murmur heard.    No friction rub. No gallop.   Pulmonary:      Effort: No respiratory distress.      Breath sounds: Normal breath sounds. No wheezing or rales.   Abdominal:      General: Bowel sounds are normal. There is no distension.      Palpations: Abdomen is soft. There is no mass.      Tenderness: There is no abdominal tenderness.   Musculoskeletal:         General: No deformity.      Cervical back: Neck supple.   Lymphadenopathy:      Cervical: No cervical adenopathy.   Skin:     General: Skin is warm and dry.       Findings: No rash.   Neurological:      Mental Status: He is alert and oriented to person, place, and time.         CRANIAL NERVES     CN III, IV, VI   Pupils are equal, round, and reactive to light.     Significant Labs: All pertinent labs within the past 24 hours have been reviewed.    Significant Imaging: I have reviewed all pertinent imaging results/findings within the past 24 hours.

## 2022-05-16 NOTE — SUBJECTIVE & OBJECTIVE
Interval History:  Patient doing well.  Advanced to diabetic diet.  Insulin drip stopped.  Still with some nausea but no emesis      Objective:     Vital Signs (Most Recent):  Temp: 98.5 °F (36.9 °C) (05/16/22 1527)  Pulse: 66 (05/16/22 1527)  Resp: 18 (05/16/22 1527)  BP: (!) 148/66 (05/16/22 1527)  SpO2: (!) 91 % (05/16/22 1527)   Vital Signs (24h Range):  Temp:  [97 °F (36.1 °C)-98.5 °F (36.9 °C)] 98.5 °F (36.9 °C)  Pulse:  [58-75] 66  Resp:  [17-20] 18  SpO2:  [91 %-99 %] 91 %  BP: (113-163)/(56-88) 148/66     Weight: 71.4 kg (157 lb 6.4 oz)  Body mass index is 20.21 kg/m².    Intake/Output Summary (Last 24 hours) at 5/16/2022 1729  Last data filed at 5/16/2022 1300  Gross per 24 hour   Intake 2480 ml   Output --   Net 2480 ml      Physical Exam  GENERAL:  No apparent distress. Alert and oriented times three  EYES:  EOMI. Conjunctivae intact  ENT:  Posterior pharynx clear, dry mucous membranes  NECK:  Supple with no lymphadenopathy or thyromegaly  LUNGS:  No respiratory distress. Clear to auscultation bilaterally with good air movement  CARDIAC:  RRR without murmur, rub or gallop  ABDOMEN:  Nontender and nondistended. No organomegaly  EXTREMITIES:  Peripheral pulses are 2+. Hands and feet are warm.  SKIN:  Skin color, texture and turgor normal. No rashes, ulcerations or nodules      Pertinent labs and imaging reviewed

## 2022-05-16 NOTE — ED NOTES
I-STAT Chem-8+ Results:   Value Reference Range   Sodium 136 136-145 mmol/L   Potassium  5.6 3.5-5.1 mmol/L   Chloride 101  mmol/L   Ionized Calcium 1.15 1.06-1.42 mmol/L   CO2 (measured) 16 23-29 mmol/L   Glucose 651  mg/dL   BUN 50 6-30 mg/dL   Creatinine 2.1 0.5-1.4 mg/dL   Hematocrit 43 36-54%

## 2022-05-16 NOTE — CONSULTS
Nutrition consult received regarding Diabetic diet education (A1C 9.7 - 4/5/2022).  Pt very familiar to nutritional services; admitted x 6 this year w/ DKA (most recently educated 5/3).    RD to attach another copy of Diabetic diet education to pt's discharge paperwork.    Thanks!  MS Baylee, RD, LDN

## 2022-05-16 NOTE — PLAN OF CARE
[facility] - Telemetry Stepdown  Initial Discharge Assessment       Primary Care Provider: Basim Guerrero MD    Admission Diagnosis: DKA (diabetic ketoacidosis) [E11.10]  Hyperglycemia [R73.9]    Admission Date: 5/15/2022  Expected Discharge Date: 5/19/2022    Discharge Barriers Identified: None    Payor: [employer] MANAGED MEDICARE / Plan: HU[employer] MEDICARE HMO / Product Type: Capitation /     Extended Emergency Contact Information  Primary Emergency Contact: Basia Herrera  Mobile Phone: [phone]  Relation: Daughter  Secondary Emergency Contact: AdriánMarisa  Mobile Phone: [phone]  Relation: Daughter  Preferred language: English   needed? No    Discharge Plan A: Return to nursing home  Discharge Plan B: Return to Nursing Home      [id number] - [place] - [address] AT [facility] [address]  [address]  [place] [address]  Phone: [phone] Fax: [phone]    [facility]  [address] [facility] [address]  [place] [address]  Phone: [phone] Fax: [phone]    CM spoke with patient in the room.  He is from [facility] daughter home that he was placed there from his last admission here at [facility].  The plan is for him to return to snf once medically stable.  He was unable to walk since about a week ago with his prior admission, but usually he walks with an assistance of a walker.    Initial Assessment (most recent)     Adult Discharge Assessment - 05/16/22 0924        Discharge Assessment    Assessment Type Discharge Planning Assessment     Confirmed/corrected address, phone number and insurance Yes     Confirmed Demographics Correct on Facesheet     Source of Information patient     Communicated ALLIE with patient/caregiver Yes     Reason For Admission DKA     Lives With spouse     Facility Arrived From: [facility] daughter home     Do you expect to return to your current living situation? Yes     Do you have help at home or someone  to help you manage your care at home? Yes     Who are your caregiver(s) and their phone number(s)? Basia Herrera (daughter) [phone]     Prior to hospitilization cognitive status: Alert/Oriented     Current cognitive status: Alert/Oriented     Walking or Climbing Stairs Difficulty ambulation difficulty, requires equipment     Mobility Management He uses a walker     Dressing/Bathing Difficulty none     Home Layout Able to live on 1st floor     Equipment Currently Used at Home walker, rolling     Readmission within 30 days? Yes     Patient currently being followed by outpatient case management? No     Do you currently have service(s) that help you manage your care at home? No     Do you take prescription medications? Yes     Do you have prescription coverage? Yes     Coverage [employer] managed medicare     Do you have any problems affording any of your prescribed medications? No     Is the patient taking medications as prescribed? yes     Who is going to help you get home at discharge? Lalito Eddy (son) [phone]     How do you get to doctors appointments? family or friend will provide     Are you on dialysis? No     Do you take coumadin? No     Discharge Plan A Return to nursing home     Discharge Plan B Return to Nursing Home     DME Needed Upon Discharge  none     Discharge Plan discussed with: Patient     Discharge Barriers Identified None                 Readmission Assessment (most recent)     Readmission Assessment - 05/16/22 0927        Readmission    Why were you hospitalized in the last 30 days? dka     Why were you readmitted? Related to previous admission     When you left the hospital where did you go? SNF     Did patient/caregiver refused recommended DC plan? No     Did you try to see or did see a doctor or nurse before you came? Yes     Were you seen? Yes     Did you have  a follow-up appointment on discharge? Yes     Did you go? No     Was this a planned readmission? No

## 2022-05-16 NOTE — ASSESSMENT & PLAN NOTE
- with bicarb 11, anion gap 31 and pH 7.189 indicative of DKA on admission, most recent a1c-9.7%  -Suspect underlying etiology missed insulin doses due to moving from NH to ED and back without timely med administration  -DKA pathway ordered with insulin bolus followed by insulin gtt atrting at 0.1 unit/kg  -Endocrine consulted for assistance due to pt. Brittle diabetes with recurrent DKA    -now anion gap closed and electrolytes stable  -insulin drip discontinued  -currently on 6 of detemir b.i.d., and aspart t.i.d. a.c. for to 10 units  -appreciate endocrine recs

## 2022-05-16 NOTE — ASSESSMENT & PLAN NOTE
- with bicarb 11, anion gap 31 and pH 7.189 indicative of DKA  -Suspect underlying etiology missed insulin doses due to moving from NH to ED and back without timely med administration  -DKA pathway ordered with insulin bolus followed by insulin gtt atrting at 0.1 unit/kg  -Endocrine consulted for assistance due to pt. Brittle diabetes with recurrent DKA

## 2022-05-16 NOTE — PLAN OF CARE
Problem: Adult Inpatient Plan of Care  Goal: Plan of Care Review  Outcome: Ongoing, Progressing  Goal: Patient-Specific Goal (Individualized)  Outcome: Ongoing, Progressing  Goal: Absence of Hospital-Acquired Illness or Injury  Outcome: Ongoing, Progressing  Goal: Optimal Comfort and Wellbeing  Outcome: Ongoing, Progressing  Goal: Readiness for Transition of Care  Outcome: Ongoing, Progressing     Pt in  bed with eyes open, AOX3, able to voice needs, no distress noted, condom cath in place, IV in place and infusing, bed locked in lowest position, call bell in reach, instructed to call when assistance needed.

## 2022-05-16 NOTE — PLAN OF CARE
Problem: Adult Inpatient Plan of Care  Goal: Plan of Care Review  Outcome: Ongoing, Progressing  Flowsheets (Taken 5/16/2022 0357)  Plan of Care Reviewed With: patient     Problem: Diabetic Ketoacidosis  Goal: Fluid and Electrolyte Balance with Absence of Ketosis  Outcome: Ongoing, Progressing  Intervention: Monitor and Manage Ketoacidosis  Flowsheets (Taken 5/16/2022 0357)  Fluid/Electrolyte Management: fluids adjusted  Glycemic Management:   blood glucose monitored   insulin infusion adjusted     Problem: Diabetes Comorbidity  Goal: Blood Glucose Level Within Targeted Range  Outcome: Ongoing, Progressing  Intervention: Monitor and Manage Glycemia  Flowsheets (Taken 5/16/2022 0357)  Glycemic Management:   blood glucose monitored   insulin infusion adjusted     Problem: Fall Injury Risk  Goal: Absence of Fall and Fall-Related Injury  Outcome: Ongoing, Progressing  Intervention: Identify and Manage Contributors  Flowsheets (Taken 5/16/2022 0357)  Self-Care Promotion:   independence encouraged   BADL personal objects within reach   BADL personal routines maintained  Medication Review/Management: medications reviewed  Intervention: Promote Injury-Free Environment  Flowsheets (Taken 5/16/2022 0357)  Safety Promotion/Fall Prevention:   assistive device/personal item within reach   bed alarm set   side rails raised x 2   nonskid shoes/socks when out of bed    Pt AAO*4 calm, cooperative. Pt on Continuous insulin infusion, adjusted per MD orders and Algorithm. Safely maintained overnight. WCTM.

## 2022-05-16 NOTE — NURSING
Patient arrived on the floor from ED via Stretcher.Pt AAO*4, calm,cooperative. Oriented to the room. Insulin gtt infusing at 3ml/hr upon arrival. Rechecked the BG at 1hr time and it is greater than 500, MD on call notified. Per orders increased the Insulin to 5 ml/hr, will follow algorithm. STAT labs ordered. Pt verbalized understanding. Pt belongings at the bedside, call light within reach, bed alarm on, safely maintained. ELFEGO.

## 2022-05-16 NOTE — PLAN OF CARE
05/16/22 1009   Post-Acute Status   Post-Acute Authorization Placement   Post-Acute Placement Status Pending medical clearance/testing  ([facility]- return at [place])   Attempted to return call to Renée ([phone]) in admissions at [facility] and left voicemail.    Bess Mathews LMSW  [facility]  Ext. [phone]

## 2022-05-16 NOTE — ED NOTES
I-STAT Chem-8+ Results:   Value Reference Range   Sodium 134 136-145 mmol/L   Potassium  7.7 3.5-5.1 mmol/L   Chloride 101  mmol/L   Ionized Calcium 1.08 1.06-1.42 mmol/L   CO2 (measured) 20 23-29 mmol/L   Glucose 540  mg/dL   BUN 62 6-30 mg/dL   Creatinine 2.0 0.5-1.4 mg/dL   Hematocrit 43 36-54%    *specimen assumed to be hemolyzed, K inaccurate.

## 2022-05-16 NOTE — ED TRIAGE NOTES
Kamar Muñoz, a 76 y.o. male presents to the ED w/ complaint of hyperglycemia. Pt arrives by EMS from [facility]. EMS reports CBG over 500. Pt is alert and oriented and able to provide some medical history. Pt states that he was in the ED for constipation and was discharged last night. Pt reports that nursing home gives him insulin regularly. Reports nausea, vomiting, constipation, headache at the base of the skull. Denies diarrhea, chest pain, SOB, vision changes.     Triage note:  Chief Complaint   Patient presents with    Hyperglycemia     Pt brought in by EMS from [facility]. Reporting no BM for 4 days and EMS reports CBG HIGH.      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

## 2022-05-16 NOTE — ASSESSMENT & PLAN NOTE
-Pt. In NSR on admit  -Continue home amiodarone and toprol-XL for rate control and eliquis for AC

## 2022-05-16 NOTE — PT/OT/SLP PROGRESS
Occupational Therapy      Patient Name:  Kamar Muñoz   MRN:  [id number]    Patient not seen today secondary to pt. Declined x 2 trials reporting too weak to participate on this date.   5/16/2022

## 2022-05-16 NOTE — ED NOTES
Discussed insulin titration with ED care team. Based on lack of consistent exact glucose measurements, verbal order received to increase to 6 units/hr and reassess glucose in 1hr.

## 2022-05-16 NOTE — ED NOTES
Pt incontinent of urine. Cleaned of incontinence, linen changed, pt repositioned in bed. Pt denies any additional needs at this time.

## 2022-05-16 NOTE — PT/OT/SLP PROGRESS
Physical Therapy      Patient Name:  Kamar Muñoz   MRN:  [id number]    Attempted to see patient for PT; however patient politely deferred, citing fatigue and requesting to rest. Physical therapy will follow-up as able.    Shahida Dash, PT, DPT, GCS  5/16/2022

## 2022-05-16 NOTE — HPI
75 yo M with PMHx poorly controlled DM2 with recurrent DKA, CAD, HLD, paroxysmal Afib, and seizures who presents to the ED from NH due to elevated BG. Pt. Was seen in ED yesterday/last night for constipation and BRBPR which was thought to be 2/2 to straining. Pt. Was discharged back to NH early this morning. Today at NH, pt. BG was noted to be >500, so the patient was sent back to the ED. Pt. Was seen in ED ~11pm last night and discharged back to NH at 6am this morning. It is unclear if he missed his basal insulin at NH (6 units levemir BID) last night prior to coming to the ED or this morning after returning to NH. At time of my interview, pt. Is resting comfortably on insulin gtt. Pt. Does complain of some nausea and mild abdominal cramps. No reported fevers, chills, cough. Pt. Was given a brown bomb enema last night with large stool output and has had one more BM today.

## 2022-05-16 NOTE — HOSPITAL COURSE
Patient admitted for DKA.  Was quickly transitioned off of insulin drip and now on subcu insulin with anion gap closed.  Doing well on appetite increasing however still complaining of some nausea.  Endocrine following appreciate peter

## 2022-05-16 NOTE — NURSING
Pt BG dropped to 47, MD on call notified. Per orders stopped the insulin gtt and IVF. Administering D10 IV bolus 250cc. Pt asymptomatic, able to answer orientation questions. Will recheck BG in 15 mins    Recheck BG is 227, Per MD orders discontinued Insulin gtt and will monitor for labs.

## 2022-05-17 LAB
ANION GAP SERPL CALC-SCNC: 8 MMOL/L (ref 8–16)
BASOPHILS # BLD AUTO: 0.03 K/UL (ref 0–0.2)
BASOPHILS NFR BLD: 0.3 % (ref 0–1.9)
BUN SERPL-MCNC: 21 MG/DL (ref 8–23)
CALCIUM SERPL-MCNC: 8.2 MG/DL (ref 8.7–10.5)
CHLORIDE SERPL-SCNC: 110 MMOL/L (ref 95–110)
CO2 SERPL-SCNC: 25 MMOL/L (ref 23–29)
CREAT SERPL-MCNC: 1.1 MG/DL (ref 0.5–1.4)
DIFFERENTIAL METHOD: ABNORMAL
EOSINOPHIL # BLD AUTO: 0.1 K/UL (ref 0–0.5)
EOSINOPHIL NFR BLD: 0.8 % (ref 0–8)
ERYTHROCYTE [DISTWIDTH] IN BLOOD BY AUTOMATED COUNT: 14.5 % (ref 11.5–14.5)
EST. GFR  (AFRICAN AMERICAN): >60 ML/MIN/1.73 M^2
EST. GFR  (NON AFRICAN AMERICAN): >60 ML/MIN/1.73 M^2
GLUCOSE SERPL-MCNC: 108 MG/DL (ref 70–110)
HCT VFR BLD AUTO: 33.8 % (ref 40–54)
HCV AB SERPL QL IA: NEGATIVE
HGB BLD-MCNC: 11.1 G/DL (ref 14–18)
IMM GRANULOCYTES # BLD AUTO: 0.02 K/UL (ref 0–0.04)
IMM GRANULOCYTES NFR BLD AUTO: 0.2 % (ref 0–0.5)
LYMPHOCYTES # BLD AUTO: 1.7 K/UL (ref 1–4.8)
LYMPHOCYTES NFR BLD: 19.5 % (ref 18–48)
MCH RBC QN AUTO: 30.2 PG (ref 27–31)
MCHC RBC AUTO-ENTMCNC: 32.8 G/DL (ref 32–36)
MCV RBC AUTO: 92 FL (ref 82–98)
MONOCYTES # BLD AUTO: 0.7 K/UL (ref 0.3–1)
MONOCYTES NFR BLD: 7.5 % (ref 4–15)
NEUTROPHILS # BLD AUTO: 6.3 K/UL (ref 1.8–7.7)
NEUTROPHILS NFR BLD: 71.7 % (ref 38–73)
NRBC BLD-RTO: 0 /100 WBC
PLATELET # BLD AUTO: 189 K/UL (ref 150–450)
PMV BLD AUTO: 9.4 FL (ref 9.2–12.9)
POCT GLUCOSE: 108 MG/DL (ref 70–110)
POCT GLUCOSE: 145 MG/DL (ref 70–110)
POCT GLUCOSE: 169 MG/DL (ref 70–110)
POCT GLUCOSE: 185 MG/DL (ref 70–110)
POCT GLUCOSE: 227 MG/DL (ref 70–110)
POTASSIUM SERPL-SCNC: 3.4 MMOL/L (ref 3.5–5.1)
RBC # BLD AUTO: 3.67 M/UL (ref 4.6–6.2)
SARS-COV-2 RNA RESP QL NAA+PROBE: NOT DETECTED
SODIUM SERPL-SCNC: 143 MMOL/L (ref 136–145)
WBC # BLD AUTO: 8.81 K/UL (ref 3.9–12.7)

## 2022-05-17 PROCEDURE — 97530 THERAPEUTIC ACTIVITIES: CPT

## 2022-05-17 PROCEDURE — 97165 OT EVAL LOW COMPLEX 30 MIN: CPT

## 2022-05-17 PROCEDURE — 20600001 HC STEP DOWN PRIVATE ROOM

## 2022-05-17 PROCEDURE — U0003 INFECTIOUS AGENT DETECTION BY NUCLEIC ACID (DNA OR RNA); SEVERE ACUTE RESPIRATORY SYNDROME CORONAVIRUS 2 (SARS-COV-2) (CORONAVIRUS DISEASE [COVID-19]), AMPLIFIED PROBE TECHNIQUE, MAKING USE OF HIGH THROUGHPUT TECHNOLOGIES AS DESCRIBED BY CMS-2020-01-R: HCPCS | Performed by: STUDENT IN AN ORGANIZED HEALTH CARE EDUCATION/TRAINING PROGRAM

## 2022-05-17 PROCEDURE — 99233 PR SUBSEQUENT HOSPITAL CARE,LEVL III: ICD-10-PCS | Mod: ,,, | Performed by: STUDENT IN AN ORGANIZED HEALTH CARE EDUCATION/TRAINING PROGRAM

## 2022-05-17 PROCEDURE — 80048 BASIC METABOLIC PNL TOTAL CA: CPT | Performed by: STUDENT IN AN ORGANIZED HEALTH CARE EDUCATION/TRAINING PROGRAM

## 2022-05-17 PROCEDURE — 63600175 PHARM REV CODE 636 W HCPCS: Performed by: HOSPITALIST

## 2022-05-17 PROCEDURE — 99232 SBSQ HOSP IP/OBS MODERATE 35: CPT | Mod: GC,,, | Performed by: INTERNAL MEDICINE

## 2022-05-17 PROCEDURE — 99232 PR SUBSEQUENT HOSPITAL CARE,LEVL II: ICD-10-PCS | Mod: GC,,, | Performed by: INTERNAL MEDICINE

## 2022-05-17 PROCEDURE — 94761 N-INVAS EAR/PLS OXIMETRY MLT: CPT

## 2022-05-17 PROCEDURE — 36415 COLL VENOUS BLD VENIPUNCTURE: CPT | Performed by: STUDENT IN AN ORGANIZED HEALTH CARE EDUCATION/TRAINING PROGRAM

## 2022-05-17 PROCEDURE — U0005 INFEC AGEN DETEC AMPLI PROBE: HCPCS | Performed by: STUDENT IN AN ORGANIZED HEALTH CARE EDUCATION/TRAINING PROGRAM

## 2022-05-17 PROCEDURE — 25000003 PHARM REV CODE 250: Performed by: STUDENT IN AN ORGANIZED HEALTH CARE EDUCATION/TRAINING PROGRAM

## 2022-05-17 PROCEDURE — 99233 SBSQ HOSP IP/OBS HIGH 50: CPT | Mod: ,,, | Performed by: STUDENT IN AN ORGANIZED HEALTH CARE EDUCATION/TRAINING PROGRAM

## 2022-05-17 PROCEDURE — 85025 COMPLETE CBC W/AUTO DIFF WBC: CPT | Performed by: STUDENT IN AN ORGANIZED HEALTH CARE EDUCATION/TRAINING PROGRAM

## 2022-05-17 PROCEDURE — 63600175 PHARM REV CODE 636 W HCPCS: Performed by: STUDENT IN AN ORGANIZED HEALTH CARE EDUCATION/TRAINING PROGRAM

## 2022-05-17 PROCEDURE — 25000003 PHARM REV CODE 250: Performed by: HOSPITALIST

## 2022-05-17 RX ORDER — SUCRALFATE 1 G/10ML
1 SUSPENSION ORAL
Status: DISCONTINUED | OUTPATIENT
Start: 2022-05-17 | End: 2022-05-18 | Stop reason: HOSPADM

## 2022-05-17 RX ORDER — BALSAM PERU/CASTOR OIL
OINTMENT (GRAM) TOPICAL 2 TIMES DAILY
Status: DISCONTINUED | OUTPATIENT
Start: 2022-05-17 | End: 2022-05-18 | Stop reason: HOSPADM

## 2022-05-17 RX ORDER — LOSARTAN POTASSIUM 25 MG/1
25 TABLET ORAL DAILY
Status: DISCONTINUED | OUTPATIENT
Start: 2022-05-17 | End: 2022-05-18 | Stop reason: HOSPADM

## 2022-05-17 RX ADMIN — INSULIN DETEMIR 6 UNITS: 100 INJECTION, SOLUTION SUBCUTANEOUS at 08:05

## 2022-05-17 RX ADMIN — POTASSIUM BICARBONATE 50 MEQ: 25 TABLET, EFFERVESCENT ORAL at 10:05

## 2022-05-17 RX ADMIN — METOPROLOL SUCCINATE 50 MG: 50 TABLET, EXTENDED RELEASE ORAL at 08:05

## 2022-05-17 RX ADMIN — INSULIN ASPART 4 UNITS: 100 INJECTION, SOLUTION INTRAVENOUS; SUBCUTANEOUS at 02:05

## 2022-05-17 RX ADMIN — CLOPIDOGREL 75 MG: 75 TABLET, FILM COATED ORAL at 08:05

## 2022-05-17 RX ADMIN — ONDANSETRON 4 MG: 2 INJECTION INTRAMUSCULAR; INTRAVENOUS at 08:05

## 2022-05-17 RX ADMIN — ATORVASTATIN CALCIUM 40 MG: 20 TABLET, FILM COATED ORAL at 08:05

## 2022-05-17 RX ADMIN — PANTOPRAZOLE SODIUM 40 MG: 40 TABLET, DELAYED RELEASE ORAL at 08:05

## 2022-05-17 RX ADMIN — INSULIN ASPART 12 UNITS: 100 INJECTION, SOLUTION INTRAVENOUS; SUBCUTANEOUS at 02:05

## 2022-05-17 RX ADMIN — APIXABAN 5 MG: 5 TABLET, FILM COATED ORAL at 08:05

## 2022-05-17 RX ADMIN — SERTRALINE HYDROCHLORIDE 25 MG: 25 TABLET ORAL at 08:05

## 2022-05-17 RX ADMIN — SUCRALFATE 1 G: 1 SUSPENSION ORAL at 04:05

## 2022-05-17 RX ADMIN — Medication: at 04:05

## 2022-05-17 RX ADMIN — SUCRALFATE 1 G: 1 SUSPENSION ORAL at 08:05

## 2022-05-17 RX ADMIN — LACOSAMIDE 100 MG: 50 TABLET, FILM COATED ORAL at 08:05

## 2022-05-17 RX ADMIN — LOSARTAN POTASSIUM 25 MG: 25 TABLET, FILM COATED ORAL at 11:05

## 2022-05-17 RX ADMIN — AMIODARONE HYDROCHLORIDE 200 MG: 200 TABLET ORAL at 08:05

## 2022-05-17 RX ADMIN — TAMSULOSIN HYDROCHLORIDE 0.4 MG: 0.4 CAPSULE ORAL at 08:05

## 2022-05-17 RX ADMIN — INSULIN ASPART 12 UNITS: 100 INJECTION, SOLUTION INTRAVENOUS; SUBCUTANEOUS at 04:05

## 2022-05-17 RX ADMIN — Medication: at 08:05

## 2022-05-17 NOTE — ASSESSMENT & PLAN NOTE
- with bicarb 11, anion gap 31 and pH 7.189 indicative of DKA on admission, most recent a1c-9.7%  -Suspect underlying etiology missed insulin doses due to moving from NH to ED and back without timely med administration  -DKA pathway ordered with insulin bolus followed by insulin gtt atrting at 0.1 unit/kg  -Endocrine consulted for assistance due to pt. Brittle diabetes with recurrent DKA    -now anion gap closed and electrolytes stable  -insulin drip discontinued    Endocrine following, recs below:  -  continue Levemir 6 units twice daily  -  continue on insulin aspart 8-16 units (please give 8 units if eats <25%, 12 units with 50 % intake and full dose if eats 100%),  in addition to moderate correction with with meals  -  Aspart 4 units PRN in place for nightly and in between meal snacks  -  If BG >350 do not feed the patient. Please give a correction scale and re-start meal once BG closer to 200  -  Will observe trend and adjust insulin accordingly  -  Would likely better benefit from Carbohydrate Ratio instead with carbohydrate counting due to dietary indiscretion and variable intake and boosts with meals  -  He has had large fluctuations in between hyperglycemia and hypoglycemia during prandial times     *For discharge, can consider sending on above regimen      *Fixed doses of insulin we have recommended are to cover meals containing 60 g of carbohydrate and it is very important that meals are consistently containing 60 g of carbs.  If patient exceed 60 g of carbohydrate per meal or consumes any carbohydrates between meals without insulin coverage this will likely again lead to significant hyperglycemia. In addition if he eats < 50 % of meal to only give half the recommended pre-meal dose.

## 2022-05-17 NOTE — ASSESSMENT & PLAN NOTE
Prior history of adjustment disorder potentially depression, patient on SSRI  -continue home SSRI  -psychology consulted

## 2022-05-17 NOTE — CONSULTS
[facility] - Telemetry Stepdown  Endocrinology  Diabetes Consult Note    Consult Requested by: Minnie Collazo MD   Reason for admit: Diabetic ketoacidosis without coma associated with type 2 diabetes mellitus    HISTORY OF PRESENT ILLNESS:  Reason for Consult: Management of Diabetic Ketoacidosis     Diabetes diagnosis year: more than 20 years ago, with multiple episodes of Diabetic Ketoacidosis    Home Diabetes Medications:  He was recently admitted to the hospital for Diabetic Ketoacidosis and discharged on 5.03.22 with instructions that fixed doses of insulin recommended are to cover meals containing 60 g of carbohydrate and it is very important that meals are consistently containing 60 g of carbohydrates. His regime was as following:  -  Levemir 6 units BID   -  Aspart 18 units BF (give 9 units if eats <50% and full dose if eats >50%), 18 units L (give 9 units if eats <50% and full dose if eats >50%), 12 units D (give 6 units if eats <50% and full dose if eats >50%), in addition to moderate correction scale with with meals  -  Aspart 4 units PRN in place for nightly and between-meal snacks  -  Carb Ratio instead of Carb Counting due to dietary indiscretion and variable intake and Boosts with meals    Diabetes Complications include:     Hyperglycemia, Hypoglycemia, Diabetic peripheral neuropathy, Dyslipidemia and Cerebrovascular Accident    HPI:   He presents to the ED from nursing home due to elevated blood glucose.   She was seen in ED a day before admission for constipation and BRBPR which was thought to be due to straining and was discharged back to nursing home  Now he comes back from the nursing home after his glucose was noted to be > 500 mg/dl. He currently has nausea, polyuria and some abdominal discomfort  It is unclear if he missed his insulin the night prior to coming to the ED or the morning after returning to NH. He does not remember getting any insulin and can not recall when was the last dose of  insulin given to him at nursing home.   Patient was found to be in Diabetic Ketoacidosis in the ER and was started on Insulin drip.      Interval HPI:   Patient transitioned to subcutaneous insulin    PMH, PSH, FH, SH updated and reviewed     Review of Systems as above     Current Medications and/or Treatments Impacting Glycemic Control  Immunotherapy:    Immunosuppressants       None          Steroids:   Hormones (From admission, onward)                None          Pressors:    Autonomic Drugs (From admission, onward)                None          Hyperglycemia/Diabetes Medications:   Antihyperglycemics (From admission, onward)                Start     Stop Route Frequency Ordered    05/16/22 1645  insulin aspart U-100 pen 4-10 Units         -- SubQ 3 times daily with meals 05/16/22 1521    05/16/22 0900  insulin detemir U-100 pen 6 Units         -- SubQ 2 times daily 05/16/22 0601    05/16/22 0701  insulin aspart U-100 pen 0-5 Units         -- SubQ Before meals & nightly PRN 05/16/22 0601    05/16/22 0143  insulin regular injection 8 Units         -- IV Once 05/16/22 0143             PHYSICAL EXAMINATION:  Vitals:    05/16/22 1729   BP:    Pulse: 64   Resp:    Temp:      Body mass index is 20.21 kg/m².    Physical Exam  HENT:      Head: Normocephalic and atraumatic.      Right Ear: External ear normal.      Left Ear: External ear normal.      Mouth/Throat:      Mouth: Mucous membranes are moist.   Eyes:      Conjunctiva/sclera: Conjunctivae normal.   Cardiovascular:      Rate and Rhythm: Normal rate.   Pulmonary:      Effort: Pulmonary effort is normal.   Abdominal:      Tenderness: There is abdominal tenderness.   Musculoskeletal:      Cervical back: Normal range of motion.      Comments: Lying on bed   Neurological:      General: No focal deficit present.      Mental Status: He is alert.      Comments: Poor memory with both loss of short term and long term memory   Psychiatric:         Mood and Affect: Mood  normal.         Labs Reviewed and Include   Recent Labs   Lab 05/16/22  1712   *   CALCIUM 8.2*      K 4.6   CO2 22*      BUN 28*   CREATININE 1.6*     Lab Results   Component Value Date    WBC 12.50 05/16/2022    HGB 12.4 (L) 05/16/2022    HCT 38.3 (L) 05/16/2022    MCV 90 05/16/2022     05/16/2022     No results for input(s): TSH, FREET4 in the last 168 hours.  Lab Results   Component Value Date    HGBA1C 9.7 (H) 04/05/2022       Nutritional status:   Body mass index is 20.21 kg/m².  Lab Results   Component Value Date    ALBUMIN 3.2 (L) 05/15/2022    ALBUMIN 3.2 (L) 05/13/2022    ALBUMIN 2.6 (L) 05/02/2022     No results found for: PREALBUMIN    Estimated Creatinine Clearance: 38.4 mL/min (A) (based on SCr of 1.6 mg/dL (H)).    Accu-Checks  Recent Labs     05/16/22  0123 05/16/22  0222 05/16/22  0324 05/16/22  0422 05/16/22  0524 05/16/22  0557 05/16/22  0627 05/16/22  0728 05/16/22  1140 05/16/22  1526   POCTGLUCOSE 427* 378* 352* 260* 47* 227* 238* 256* 268* 364*        ASSESSMENT and PLAN    * Diabetic ketoacidosis without coma associated with type 2 diabetes mellitus  - resolved  - transitioned to subcutaneous insulin by primary team    Ketosis-prone diabetes mellitus  -  continue Levemir 6 units BID (fasting hypoglycemia with 8 units bid)  -  start on insulin aspart 8-16 units (please give 8 units if eats <25%, 12 units with 50 % intake and full dose if eats 100%),  in addition to moderate correction with with meals  -  Aspart 4 units PRN in place for nightly and in between meal snacks  -  If BG >350 do not feed the patient. Please give a correction scale and re-start meal once BG closer to 200  -  Will observe trend and adjust insulin accordingly    Type 2 diabetes mellitus with hyperglycemia, with long-term current use of insulin                Modesto Abraham MD  Endocrinology  [facility]

## 2022-05-17 NOTE — ASSESSMENT & PLAN NOTE
-  continue Levemir 6 units BID (fasting hypoglycemia with 8 units bid)  -  start on insulin aspart 8-16 units (please give 8 units if eats <25%, 12 units with 50 % intake and full dose if eats 100%),  in addition to moderate correction with with meals  -  Aspart 4 units PRN in place for nightly and in between meal snacks  -  If BG >350 do not feed the patient. Please give a correction scale and re-start meal once BG closer to 200  -  Will observe trend and adjust insulin accordingly

## 2022-05-17 NOTE — CONSULTS
[facility] - Telemetry Stepdown  Wound Care    Patient Name:  Kamar Muñoz   MRN:  [id number]  Date: 2022  Diagnosis: Diabetic ketoacidosis without coma associated with type 2 diabetes mellitus    History:     Past Medical History:   Diagnosis Date    Arthritis     Coronary artery disease     COVID-19 virus infection 2022    Diabetes mellitus type II     Embolic stroke involving left cerebellar artery 2022    Hyperlipidemia     Hypertension     Kidney stone     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 2012    STEMI involving right coronary artery 2022    Type II or unspecified type diabetes mellitus with neurological manifestations, uncontrolled(250.62) 3/8/2013    Urinary tract infection        Social History     Socioeconomic History    Marital status:    Tobacco Use    Smoking status: Former Smoker     Packs/day: 1.50     Years: 25.00     Pack years: 37.50     Quit date: 1983     Years since quittin.4    Smokeless tobacco: Never Used   Substance and Sexual Activity    Alcohol use: No    Drug use: No    Sexual activity: Yes     Partners: Female   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     Social Determinants of Health     Financial Resource Strain: Low Risk     Difficulty of Paying Living Expenses: Not hard at all   Food Insecurity: No Food Insecurity    Worried About Running Out of Food in the Last Year: Never true    Ran Out of Food in the Last Year: Never true   Transportation Needs: No Transportation Needs    Lack of Transportation (Medical): No    Lack of Transportation (Non-Medical): No   Housing Stability: Low Risk     Unable to Pay for Housing in the Last Year: No    Number of Places Lived in the Last Year: 1    Unstable Housing in the Last Year: No       Precautions:     Allergies as of 05/15/2022 - Reviewed 05/15/2022   Allergen Reaction Noted    Iodine  2012       WOC Assessment  "Details/Treatment   Wound care consult for "blanchable redness to the coccyx".  Patient came in with blanchable redness and purple area to his coccyx. He was recently discharged from this facility and sent to a NH. He is able to turn in bed but declines occupational and physical therapy often. Patient educated on importance of pressure prevention.      Plan:  Nursing to apply a thin layer of BPCO to coccyx without dressing over it BID   Waffle overlay  Heel protecting boots     Recommendations made to primary team for above plan via secure chat. Orders placed. Wound care signing off, re-consult as needed.     05/17/2022 05/17/22 1021        Altered Skin Integrity 05/16/22 0041 midline Coccyx   Date First Assessed/Time First Assessed: 05/16/22 0041   Altered Skin Integrity Present on Admission: yes  Orientation: midline  Location: Coccyx   Wound Image    Description of Altered Skin Integrity Purple or maroon localized area of discolored intact skin or non-intact skin or a blood-filled blister.   Dressing Appearance Dry;Intact   Drainage Amount None   Appearance Pink;Intact;Purple;Dry  (blanchable)   Tissue loss description Not applicable     "

## 2022-05-17 NOTE — PROGRESS NOTES
[facility] - Telemetry Stepdown  Endocrinology  Progress Note    Admit Date: 5/15/2022     Reason for Consult: Management of Diabetic Ketoacidosis     Diabetes diagnosis year: more than 20 years ago, with multiple episodes of Diabetic Ketoacidosis    Home Diabetes Medications:  He was recently admitted to the hospital for Diabetic Ketoacidosis and discharged on 5.03.22 with instructions that fixed doses of insulin recommended are to cover meals containing 60 g of carbohydrate and it is very important that meals are consistently containing 60 g of carbohydrates. His regime was as following:  -  Levemir 6 units BID   -  Aspart 18 units BF (give 9 units if eats <50% and full dose if eats >50%), 18 units L (give 9 units if eats <50% and full dose if eats >50%), 12 units D (give 6 units if eats <50% and full dose if eats >50%), in addition to moderate correction scale with with meals  -  Aspart 4 units PRN in place for nightly and between-meal snacks  -  Carb Ratio instead of Carb Counting due to dietary indiscretion and variable intake and Boosts with meals    Diabetes Complications include:     Hyperglycemia, Hypoglycemia, Diabetic peripheral neuropathy, Dyslipidemia and Cerebrovascular Accident    HPI:   He presents to the ED from nursing home due to elevated blood glucose.   She was seen in ED a day before admission for constipation and BRBPR which was thought to be due to straining and was discharged back to nursing home  Now he comes back from the nursing home after his glucose was noted to be > 500 mg/dl. He currently has nausea, polyuria and some abdominal discomfort  It is unclear if he missed his insulin the night prior to coming to the ED or the morning after returning to NH. He does not remember getting any insulin and can not recall when was the last dose of insulin given to him at nursing home.   Patient was found to be in Diabetic Ketoacidosis in the ER and was started on Insulin drip.      Interval HPI:  "  Blood sugar trending to normal range this morning    BP (!) 172/84 (BP Location: Right arm, Patient Position: Lying)   Pulse 63   Temp 97.8 °F (36.6 °C) (Oral)   Resp 17   Ht 6' 2" (1.88 m)   Wt 71.4 kg (157 lb 6.4 oz)   SpO2 (!) 92%   BMI 20.21 kg/m²     Labs Reviewed and Include    Recent Labs   Lab 05/17/22  0333      CALCIUM 8.2*      K 3.4*   CO2 25      BUN 21   CREATININE 1.1     Lab Results   Component Value Date    WBC 8.81 05/17/2022    HGB 11.1 (L) 05/17/2022    HCT 33.8 (L) 05/17/2022    MCV 92 05/17/2022     05/17/2022     No results for input(s): TSH, FREET4 in the last 168 hours.  Lab Results   Component Value Date    HGBA1C 9.7 (H) 04/05/2022       Nutritional status:   Body mass index is 20.21 kg/m².  Lab Results   Component Value Date    ALBUMIN 3.2 (L) 05/15/2022    ALBUMIN 3.2 (L) 05/13/2022    ALBUMIN 2.6 (L) 05/02/2022     No results found for: PREALBUMIN    Estimated Creatinine Clearance: 55.9 mL/min (based on SCr of 1.1 mg/dL).    Accu-Checks  Recent Labs     05/16/22  0324 05/16/22  0422 05/16/22  0524 05/16/22  0557 05/16/22  0627 05/16/22  0728 05/16/22  1140 05/16/22  1526 05/16/22  2115 05/17/22  0739   POCTGLUCOSE 352* 260* 47* 227* 238* 256* 268* 364* 241* 108       Current Medications and/or Treatments Impacting Glycemic Control  Immunotherapy:    Immunosuppressants       None          Steroids:   Hormones (From admission, onward)                None          Pressors:    Autonomic Drugs (From admission, onward)                None          Hyperglycemia/Diabetes Medications:   Antihyperglycemics (From admission, onward)                Start     Stop Route Frequency Ordered    05/17/22 0715  insulin aspart U-100 pen 8-16 Units         -- SubQ 3 times daily with meals 05/16/22 1952 05/16/22 2100  insulin detemir U-100 pen 6 Units         -- SubQ 2 times daily 05/16/22 1952 05/16/22 2051  insulin aspart U-100 pen 4 Units         -- SubQ As needed " (PRN) 05/16/22 1952 05/16/22 2050  insulin aspart U-100 pen 1-10 Units         -- SubQ Before meals & nightly PRN 05/16/22 1952            ASSESSMENT and PLAN    * Diabetic ketoacidosis without coma associated with type 2 diabetes mellitus  - likely due to missed insulin doses  - resolved  - transitioned to subcutaneous insulin     Ketosis-prone diabetes mellitus  -  continue Levemir 6 units twice daily  -  continue on insulin aspart 8-16 units (please give 8 units if eats <25%, 12 units with 50 % intake and full dose if eats 100%),  in addition to moderate correction with with meals  -  Aspart 4 units PRN in place for nightly and in between meal snacks  -  If BG >350 do not feed the patient. Please give a correction scale and re-start meal once BG closer to 200  -  Will observe trend and adjust insulin accordingly  -  Would likely better benefit from Carbohydrate Ratio instead with carbohydrate counting due to dietary indiscretion and variable intake and boosts with meals  -  He has had large fluctuations in between hyperglycemia and hypoglycemia during prandial times     *For discharge, can consider sending on above regimen      *Fixed doses of insulin we have recommended are to cover meals containing 60 g of carbohydrate and it is very important that meals are consistently containing 60 g of carbs.  If patient exceed 60 g of carbohydrate per meal or consumes any carbohydrates between meals without insulin coverage this will likely again lead to significant hyperglycemia. In addition if he eats < 50 % of meal to only give half the recommended pre-meal dose.            Modesto Abraham MD  Endocrinology  [facility]

## 2022-05-17 NOTE — SUBJECTIVE & OBJECTIVE
Interval HPI:   Patient transitioned to subcutaneous insulin    PMH, PSH, FH, SH updated and reviewed     Review of Systems as above     Current Medications and/or Treatments Impacting Glycemic Control  Immunotherapy:    Immunosuppressants       None          Steroids:   Hormones (From admission, onward)                None          Pressors:    Autonomic Drugs (From admission, onward)                None          Hyperglycemia/Diabetes Medications:   Antihyperglycemics (From admission, onward)                Start     Stop Route Frequency Ordered    05/16/22 1645  insulin aspart U-100 pen 4-10 Units         -- SubQ 3 times daily with meals 05/16/22 1521    05/16/22 0900  insulin detemir U-100 pen 6 Units         -- SubQ 2 times daily 05/16/22 0601    05/16/22 0701  insulin aspart U-100 pen 0-5 Units         -- SubQ Before meals & nightly PRN 05/16/22 0601    05/16/22 0143  insulin regular injection 8 Units         -- IV Once 05/16/22 0143             PHYSICAL EXAMINATION:  Vitals:    05/16/22 1729   BP:    Pulse: 64   Resp:    Temp:      Body mass index is 20.21 kg/m².    Physical Exam  HENT:      Head: Normocephalic and atraumatic.      Right Ear: External ear normal.      Left Ear: External ear normal.      Mouth/Throat:      Mouth: Mucous membranes are moist.   Eyes:      Conjunctiva/sclera: Conjunctivae normal.   Cardiovascular:      Rate and Rhythm: Normal rate.   Pulmonary:      Effort: Pulmonary effort is normal.   Abdominal:      Tenderness: There is abdominal tenderness.   Musculoskeletal:      Cervical back: Normal range of motion.      Comments: Lying on bed   Neurological:      General: No focal deficit present.      Mental Status: He is alert.      Comments: Poor memory with both loss of short term and long term memory   Psychiatric:         Mood and Affect: Mood normal.

## 2022-05-17 NOTE — SUBJECTIVE & OBJECTIVE
Interval History:  Patient doing well, complaining of some diffuse abdominal pain.  Also has some residual nausea.  Otherwise he is just feeling somewhat depressed and hope to speak with somebody about this.  No SI or HI    Objective:     Vital Signs (Most Recent):  Temp: 97.8 °F (36.6 °C) (05/17/22 1530)  Pulse: (!) 57 (05/17/22 1530)  Resp: 18 (05/17/22 1530)  BP: 125/61 (05/17/22 1530)  SpO2: (!) 94 % (05/17/22 1530)   Vital Signs (24h Range):  Temp:  [97.5 °F (36.4 °C)-98.6 °F (37 °C)] 97.8 °F (36.6 °C)  Pulse:  [56-63] 57  Resp:  [17-20] 18  SpO2:  [90 %-95 %] 94 %  BP: (112-172)/(61-84) 125/61     Weight: 71.4 kg (157 lb 6.4 oz)  Body mass index is 20.21 kg/m².    Intake/Output Summary (Last 24 hours) at 5/17/2022 1730  Last data filed at 5/17/2022 1300  Gross per 24 hour   Intake 240 ml   Output 1101 ml   Net -861 ml        Physical Exam  GENERAL:  No apparent distress. Alert and oriented times three  EYES:  EOMI. Conjunctivae intact  ENT:  Posterior pharynx clear, dry mucous membranes  NECK:  Supple with no lymphadenopathy or thyromegaly  LUNGS:  No respiratory distress. Clear to auscultation bilaterally with good air movement  CARDIAC:  RRR without murmur, rub or gallop  ABDOMEN:  Nontender and nondistended. No organomegaly  EXTREMITIES:  Peripheral pulses are 2+. Hands and feet are warm.  SKIN:  Skin color, texture and turgor normal. No rashes, ulcerations or nodules      Pertinent labs and imaging reviewed

## 2022-05-17 NOTE — ASSESSMENT & PLAN NOTE
-  continue Levemir 6 units twice daily  -  continue on insulin aspart 8-16 units (please give 8 units if eats <25%, 12 units with 50 % intake and full dose if eats 100%),  in addition to moderate correction with with meals  -  Aspart 4 units PRN in place for nightly and in between meal snacks  -  If BG >350 do not feed the patient. Please give a correction scale and re-start meal once BG closer to 200  -  Will observe trend and adjust insulin accordingly  -  Would likely better benefit from Carbohydrate Ratio instead with carbohydrate counting due to dietary indiscretion and variable intake and boosts with meals  -  He has had large fluctuations in between hyperglycemia and hypoglycemia during prandial times     *For discharge, can consider sending on above regimen      *Fixed doses of insulin we have recommended are to cover meals containing 60 g of carbohydrate and it is very important that meals are consistently containing 60 g of carbs.  If patient exceed 60 g of carbohydrate per meal or consumes any carbohydrates between meals without insulin coverage this will likely again lead to significant hyperglycemia. In addition if he eats < 50 % of meal to only give half the recommended pre-meal dose.

## 2022-05-17 NOTE — PT/OT/SLP PROGRESS
Physical Therapy      Patient Name:  Kamar Muñoz   MRN:  [id number]    Attempted to see patient for PT; however patient deferred 2/2 fatigue. Pt resting in bed upon arrival, stating 'not now' when therapist introduced self. Physical therapy will follow-up as able.    Shahida Dash, PT, DPT, GCS  5/17/2022

## 2022-05-17 NOTE — PLAN OF CARE
Pt in bed with eyes open, AOX3, able to voice needs, no distress noted, denies pain, educated on POC, verbalized understanding, bed locked in lowest position, call bell in reach, instructed to call when assistance needed.

## 2022-05-17 NOTE — PT/OT/SLP EVAL
"Occupational Therapy   Evaluation    Name: Kamar Muñoz  MRN: [id number]  Admitting Diagnosis:  Diabetic ketoacidosis without coma associated with type 2 diabetes mellitus  Recent Surgery: * No surgery found *      Recommendations:     Discharge Recommendations: nursing facility, skilled  Discharge Equipment Recommendations:  none  Barriers to discharge:   increased assistance needed    Assessment:     Kamar Muñoz is a 76 y.o. male with a medical diagnosis of Diabetic ketoacidosis without coma associated with type 2 diabetes mellitus. Patient tolerated session well, pleasant demeanor.  PCT present to assist with placing waffle mattress on hospital bed. Patient completed bed mobility with mod A, sit to stand transfers from EOB across 2 trials with mod A, and step transfer from EOB to upright chair and back to EOB with min A and hand held assist.      He presents with performance deficits affecting function: weakness, impaired endurance, impaired self care skills, impaired functional mobilty, gait instability, impaired balance, pain, decreased coordination, decreased lower extremity function, decreased upper extremity function, decreased safety awareness, decreased ROM.      Discharge Recommendations: [facility] (return to [facility])     Rehab Prognosis: Good; patient would benefit from acute skilled OT services to address these deficits and reach maximum level of function.       Plan:     Patient to be seen 3 x/week to address the above listed problems via self-care/home management, therapeutic activities, therapeutic exercises  · Plan of Care Expires: 06/01/22  · Plan of Care Reviewed with: patient    Subjective     Chief Complaint: "I don't want to sit in the chair, it hurts"   Patient/Family Comments/goals: Return to wife at [facility]    Occupational Profile:  Living Environment: Patient lives with wife in an apartment at [facility]  Previous level of function: Pt states he mostly uses " wheelchair at SNF because it is easier, but occasionally uses RW to ambulate to bathroom. Patient requires some assistance with ADLs   Equipment Used at Home:  walker, rolling  Assistance upon Discharge: SNF, wife, daughter lives in area    Pain/Comfort:  · Pain Rating 1: not rated  · Location - Side 1: Bilateral  · Location - Orientation 1: lower  · Location 1: abdomen  · Pain Addressed 1: Distraction, Reposition, Cessation of Activity, Nurse notified  · Pain Rating Post-Intervention 1: not rated  · Pain Rating 2:  not rated  · Location - Side 2: Bilateral  · Location - Orientation 2: generalized  · Location 2: sacral spine  · Pain Addressed 2: Reposition, Distraction, Cessation of Activity, Nurse notified  · Pain Rating Post-Intervention 2:  not rated    Patients cultural, spiritual, Worship conflicts given the current situation:      Objective:     Communicated with: SOY Osborn prior to session.  Patient found supine with peripheral IV, lopez catheter upon OT entry to room. PCT present to change waffle mattress in patient's room    General Precautions: Standard, fall   Orthopedic Precautions:N/A   Braces: N/A  Respiratory Status: Room air    Occupational Performance:    Bed Mobility:    · Patient completed Rolling/Turning to Left with  minimum assistance  · Patient completed Rolling/Turning to Right with minimum assistance  · Patient completed Scooting/Bridging with   · minimum assistance to HOB in supine  · moderate assistance to EOB while sitting  · Patient completed Supine to Sit with moderate assistance  · Patient completed Sit to Supine with moderate assistance    Functional Mobility/Transfers:  · Patient completed Sit <> Stand Transfer across 3 trials and surfaces:   · 1st trial: from EOB with moderate assistance and hand-held assist   · 2nd trial: to/from EOB with min A, HHA  · 3rd trial: from upright chair with min A, HHA  · 4th trial: to EOB with min A, HHA  · Patient completed Bed <> Chair Transfer  using Step Transfer technique with minimum assistance with hand-held assist  · Patient completed 4 lateral steps to HOB with min A with maximal encouragement    Activities of Daily Living:  · Declined this date    Cognitive/Visual Perceptual:  Cognitive/Psychosocial Skills:     -       Oriented to: AOX4   -       Follows Commands/attention:Follows multistep  commands  -       Communication: clear/fluent  -       Memory: could not determine this date  -       Safety awareness/insight to disability: impaired   -       Mood/Affect/Coping skills/emotional control: Appropriate to situation  Visual/Perceptual:      -Intact      Physical Exam:  Balance:    -       Impaired  Postural examination/scapula alignment:    -       Forward head  Skin integrity: Thin  Edema:  None noted  Sensation:    -       Intact  Motor Planning:    -       Impaired  Dominant hand:    -       Right  Upper Extremity Range of Motion:     -       Right Upper Extremity: shoulders flex to ~90  -       Left Upper Extremity: shoulders flex to~90  Upper Extremity Strength:    -       Right Upper Extremity: 3+/5  -       Left Upper Extremity: 3+/5   Strength:    Fine Motor Coordination:    -       Intact  Gross motor coordination:   WFL  Neurological:    -       Intact    AMPAC 6 Click ADL:  AMPAC Total Score: 19    Treatment & Education:   Pt educated on role of OT, POC, and goals for therapy.     POC was dicussed with patient/caregiver, who was included in its development and is in agreement with the identified goals and treatment plan.    Patient and family aware of patient's deficits and therapy progression.    Time provided for therapeutic counseling and discussion of health disposition.    Educated on importance of EOB/OOB mobility, maintaining routine, sitting up in chair, and maximizing independence with ADLs during admission    Pt completed ADLs and functional mobility for treatment session as noted above    Pt/caregiver verbalized  understanding and expressed no further concerns/questions.  Education:    Patient left supine with all lines intact, call button in reach, RN notified and PCT present    GOALS:   Multidisciplinary Problems     Occupational Therapy Goals        Problem: Occupational Therapy    Goal Priority Disciplines Outcome Interventions   Occupational Therapy Goal     OT, PT/OT Ongoing, Progressing    Description: Goals to be met by: 6/1/22     Patient will increase functional independence with ADLs by performing:    UE Dressing with Stand-by Assistance.  LE Dressing with Min A  Grooming while standing with Contact Guard Assistance.  Sitting in upright chair for 1 hour per day to improve functional mobility and muscle strength  Supine to sit with Contact Guard Assistance.  Step transfer with Stand-by Assistance  Toilet transfer to [facility] with Contact Guard Assistance.                     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    Hyperlipidemia     Hypertension     Kidney stone     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 8/2/2012    STEMI involving right coronary artery 1/9/2022    Type II or unspecified type diabetes mellitus with neurological manifestations, uncontrolled(250.62) 3/8/2013    Urinary tract infection        Past Surgical History:   Procedure Laterality Date    BACK SURGERY      CATARACT EXTRACTION W/  INTRAOCULAR LENS IMPLANT Right     Per Dr Romero note 11/2018    COLONOSCOPY N/A 1/28/2019    Procedure: COLONOSCOPY Suprep;  Surgeon: Anh Johnson MD;  Location: [facility] ENDO;  Service: Endoscopy;  Laterality: N/A;    EYE SURGERY      HERNIA REPAIR      LEFT HEART CATHETERIZATION Left 1/9/2022    Procedure: CATHETERIZATION, HEART, LEFT;  Surgeon: Will Hurst III, MD;  Location: [facility] CATH LAB/EP;  Service:  Cardiology;  Laterality: Left;    renal stones      SHOULDER OPEN ROTATOR CUFF REPAIR         Time Tracking:     OT Date of Treatment: 05/17/22  OT Start Time: 1153  OT Stop Time: 1208  OT Total Time (min): 15 min    Billable Minutes:Evaluation 7  Therapeutic Activity 8    5/17/2022

## 2022-05-17 NOTE — PLAN OF CARE
Plan of care initiated with OT Evaluation. Patient tolerated session well, pleasant demeanor.  PCT present to assist with placing waffle mattress on hospital bed. Patient completed bed mobility with mod A, sit to stand transfers from EOB across 2 trials with mod A, and step transfer from EOB to upright chair and back to EOB with min A and hand held assist.      Problem: Occupational Therapy  Goal: Occupational Therapy Goal  Description: Goals to be met by: 6/1/22     Patient will increase functional independence with ADLs by performing:    UE Dressing with Stand-by Assistance.  LE Dressing with Min A  Grooming while standing with Contact Guard Assistance.  Sitting in upright chair for 1 hour per day to improve functional mobility and muscle strength  Supine to sit with Contact Guard Assistance.  Step transfer with Stand-by Assistance  Toilet transfer to [facility] with Contact Guard Assistance.    Outcome: Ongoing, Progressing

## 2022-05-17 NOTE — PROGRESS NOTES
[facility] - Tele[facility]  Progress Note    Patient Name: Kamar Muñoz  MRN: [id number]  Patient Class: IP- Inpatient   Admission Date: 5/15/2022  Length of Stay: 2 days  Attending Physician: Minnie Collazo MD  Primary Care Provider: Basim Guerrero MD        Subjective:     Principal Problem:Diabetic ketoacidosis without coma associated with type 2 diabetes mellitus        HPI:  75 yo M with PMHx poorly controlled DM2 with recurrent DKA, CAD, HLD, paroxysmal Afib, and seizures who presents to the ED from NH due to elevated BG. Pt. Was seen in ED yesterday/last night for constipation and BRBPR which was thought to be 2/2 to straining. Pt. Was discharged back to NH early this morning. Today at NH, pt. BG was noted to be >500, so the patient was sent back to the ED. Pt. Was seen in ED ~11pm last night and discharged back to NH at 6am this morning. It is unclear if he missed his basal insulin at NH (6 units levemir BID) last night prior to coming to the ED or this morning after returning to NH. At time of my interview, pt. Is resting comfortably on insulin gtt. Pt. Does complain of some nausea and mild abdominal cramps. No reported fevers, chills, cough. Pt. Was given a brown bomb enema last night with large stool output and has had one more BM today.      Overview/Hospital Course:  Patient admitted for DKA.  Was quickly transitioned off of insulin drip and now on subcu insulin with anion gap closed.  Doing well on appetite increasing however still complaining of some nausea.  Endocrine following appreciate recs      Interval History:  Patient doing well, complaining of some diffuse abdominal pain.  Also has some residual nausea.  Otherwise he is just feeling somewhat depressed and hope to speak with somebody about this.  No SI or HI    Objective:     Vital Signs (Most Recent):  Temp: 97.8 °F (36.6 °C) (05/17/22 1530)  Pulse: (!) 57 (05/17/22 1530)  Resp: 18 (05/17/22 1530)  BP: 125/61  (05/17/22 1530)  SpO2: (!) 94 % (05/17/22 1530)   Vital Signs (24h Range):  Temp:  [97.5 °F (36.4 °C)-98.6 °F (37 °C)] 97.8 °F (36.6 °C)  Pulse:  [56-63] 57  Resp:  [17-20] 18  SpO2:  [90 %-95 %] 94 %  BP: (112-172)/(61-84) 125/61     Weight: 71.4 kg (157 lb 6.4 oz)  Body mass index is 20.21 kg/m².    Intake/Output Summary (Last 24 hours) at 5/17/2022 1730  Last data filed at 5/17/2022 1300  Gross per 24 hour   Intake 240 ml   Output 1101 ml   Net -861 ml        Physical Exam  GENERAL:  No apparent distress. Alert and oriented times three  EYES:  EOMI. Conjunctivae intact  ENT:  Posterior pharynx clear, dry mucous membranes  NECK:  Supple with no lymphadenopathy or thyromegaly  LUNGS:  No respiratory distress. Clear to auscultation bilaterally with good air movement  CARDIAC:  RRR without murmur, rub or gallop  ABDOMEN:  Nontender and nondistended. No organomegaly  EXTREMITIES:  Peripheral pulses are 2+. Hands and feet are warm.  SKIN:  Skin color, texture and turgor normal. No rashes, ulcerations or nodules      Pertinent labs and imaging reviewed        Assessment/Plan:      * Diabetic ketoacidosis without coma associated with type 2 diabetes mellitus  - with bicarb 11, anion gap 31 and pH 7.189 indicative of DKA on admission, most recent a1c-9.7%  -Suspect underlying etiology missed insulin doses due to moving from NH to ED and back without timely med administration  -DKA pathway ordered with insulin bolus followed by insulin gtt atrting at 0.1 unit/kg  -Endocrine consulted for assistance due to pt. Brittle diabetes with recurrent DKA    -now anion gap closed and electrolytes stable  -insulin drip discontinued    Endocrine following, recs below:  -  continue Levemir 6 units twice daily  -  continue on insulin aspart 8-16 units (please give 8 units if eats <25%, 12 units with 50 % intake and full dose if eats 100%),  in addition to moderate correction with with meals  -  Aspart 4 units PRN in place for  nightly and in between meal snacks  -  If BG >350 do not feed the patient. Please give a correction scale and re-start meal once BG closer to 200  -  Will observe trend and adjust insulin accordingly  -  Would likely better benefit from Carbohydrate Ratio instead with carbohydrate counting due to dietary indiscretion and variable intake and boosts with meals  -  He has had large fluctuations in between hyperglycemia and hypoglycemia during prandial times     *For discharge, can consider sending on above regimen      *Fixed doses of insulin we have recommended are to cover meals containing 60 g of carbohydrate and it is very important that meals are consistently containing 60 g of carbs.  If patient exceed 60 g of carbohydrate per meal or consumes any carbohydrates between meals without insulin coverage this will likely again lead to significant hyperglycemia. In addition if he eats < 50 % of meal to only give half the recommended pre-meal dose.       Adjustment disorder with disturbance of conduct  Prior history of adjustment disorder potentially depression, patient on SSRI  -continue home SSRI  -psychology consulted      Focal seizures  -Hx of seizures, continue home locosamide      Coronary artery disease  -No reported CXR, EKG wtihout new ST changes  -Continue home plavix, eliquis, and statin      Paroxysmal atrial fibrillation  -Pt. In NSR on admit  -Continue home amiodarone and toprol-XL for rate control and eliquis for AC        Type 2 diabetes mellitus with hyperglycemia, with long-term current use of insulin  Endocrine following appreciate recs  See below:  -  continue Levemir 6 units twice daily  -  continue on insulin aspart 8-16 units (please give 8 units if eats <25%, 12 units with 50 % intake and full dose if eats 100%),  in addition to moderate correction with with meals  -  Aspart 4 units PRN in place for nightly and in between meal snacks  -  If BG >350 do not feed the patient. Please give a  correction scale and re-start meal once BG closer to 200  -  Will observe trend and adjust insulin accordingly  -  Would likely better benefit from Carbohydrate Ratio instead with carbohydrate counting due to dietary indiscretion and variable intake and boosts with meals  -  He has had large fluctuations in between hyperglycemia and hypoglycemia during prandial times     *For discharge, can consider sending on above regimen      *Fixed doses of insulin we have recommended are to cover meals containing 60 g of carbohydrate and it is very important that meals are consistently containing 60 g of carbs.  If patient exceed 60 g of carbohydrate per meal or consumes any carbohydrates between meals without insulin coverage this will likely again lead to significant hyperglycemia. In addition if he eats < 50 % of meal to only give half the recommended pre-meal dose.       Essential hypertension  -Continue home metoprolol, holding losartan in setting of BRENDAN        VTE Risk Mitigation (From admission, onward)         Ordered     apixaban tablet 5 mg  2 times daily         05/15/22 2104     IP VTE HIGH RISK PATIENT  Once         05/15/22 2104     Place sequential compression device  Until discontinued         05/15/22 2104     Place sequential compression device  Until discontinued         05/15/22 1652                Discharge Planning   ALLIE: 5/19/2022     Code Status: DNR   Is the patient medically ready for discharge?:     Reason for patient still in hospital (select all that apply): Treatment  Discharge Plan A: Return to nursing home                  Minnie Collazo MD  Department of Hospital Medicine   [facility] - Telemetry Stepdown

## 2022-05-17 NOTE — SUBJECTIVE & OBJECTIVE
"Interval HPI:   Blood sugar trending to normal range this morning    BP (!) 172/84 (BP Location: Right arm, Patient Position: Lying)   Pulse 63   Temp 97.8 °F (36.6 °C) (Oral)   Resp 17   Ht 6' 2" (1.88 m)   Wt 71.4 kg (157 lb 6.4 oz)   SpO2 (!) 92%   BMI 20.21 kg/m²     Labs Reviewed and Include    Recent Labs   Lab 05/17/22  0333      CALCIUM 8.2*      K 3.4*   CO2 25      BUN 21   CREATININE 1.1     Lab Results   Component Value Date    WBC 8.81 05/17/2022    HGB 11.1 (L) 05/17/2022    HCT 33.8 (L) 05/17/2022    MCV 92 05/17/2022     05/17/2022     No results for input(s): TSH, FREET4 in the last 168 hours.  Lab Results   Component Value Date    HGBA1C 9.7 (H) 04/05/2022       Nutritional status:   Body mass index is 20.21 kg/m².  Lab Results   Component Value Date    ALBUMIN 3.2 (L) 05/15/2022    ALBUMIN 3.2 (L) 05/13/2022    ALBUMIN 2.6 (L) 05/02/2022     No results found for: PREALBUMIN    Estimated Creatinine Clearance: 55.9 mL/min (based on SCr of 1.1 mg/dL).    Accu-Checks  Recent Labs     05/16/22  0324 05/16/22  0422 05/16/22  0524 05/16/22  0557 05/16/22  0627 05/16/22  0728 05/16/22  1140 05/16/22  1526 05/16/22  2115 05/17/22  0739   POCTGLUCOSE 352* 260* 47* 227* 238* 256* 268* 364* 241* 108       Current Medications and/or Treatments Impacting Glycemic Control  Immunotherapy:    Immunosuppressants       None          Steroids:   Hormones (From admission, onward)                None          Pressors:    Autonomic Drugs (From admission, onward)                None          Hyperglycemia/Diabetes Medications:   Antihyperglycemics (From admission, onward)                Start     Stop Route Frequency Ordered    05/17/22 0715  insulin aspart U-100 pen 8-16 Units         -- SubQ 3 times daily with meals 05/16/22 1952 05/16/22 2100  insulin detemir U-100 pen 6 Units         -- SubQ 2 times daily 05/16/22 1952 05/16/22 2051  insulin aspart U-100 pen 4 Units         -- " SubQ As needed (PRN) 05/16/22 1952 05/16/22 2050  insulin aspart U-100 pen 1-10 Units         -- SubQ Before meals & nightly PRN 05/16/22 1952

## 2022-05-17 NOTE — ASSESSMENT & PLAN NOTE
Endocrine following appreciate recs  See below:  -  continue Levemir 6 units twice daily  -  continue on insulin aspart 8-16 units (please give 8 units if eats <25%, 12 units with 50 % intake and full dose if eats 100%),  in addition to moderate correction with with meals  -  Aspart 4 units PRN in place for nightly and in between meal snacks  -  If BG >350 do not feed the patient. Please give a correction scale and re-start meal once BG closer to 200  -  Will observe trend and adjust insulin accordingly  -  Would likely better benefit from Carbohydrate Ratio instead with carbohydrate counting due to dietary indiscretion and variable intake and boosts with meals  -  He has had large fluctuations in between hyperglycemia and hypoglycemia during prandial times     *For discharge, can consider sending on above regimen      *Fixed doses of insulin we have recommended are to cover meals containing 60 g of carbohydrate and it is very important that meals are consistently containing 60 g of carbs.  If patient exceed 60 g of carbohydrate per meal or consumes any carbohydrates between meals without insulin coverage this will likely again lead to significant hyperglycemia. In addition if he eats < 50 % of meal to only give half the recommended pre-meal dose.      12-Mar-2021

## 2022-05-18 VITALS
WEIGHT: 157.38 LBS | OXYGEN SATURATION: 93 % | TEMPERATURE: 98 F | RESPIRATION RATE: 18 BRPM | BODY MASS INDEX: 20.2 KG/M2 | DIASTOLIC BLOOD PRESSURE: 73 MMHG | HEIGHT: 74 IN | HEART RATE: 58 BPM | SYSTOLIC BLOOD PRESSURE: 148 MMHG

## 2022-05-18 LAB
ANION GAP SERPL CALC-SCNC: 6 MMOL/L (ref 8–16)
BASOPHILS # BLD AUTO: 0.03 K/UL (ref 0–0.2)
BASOPHILS NFR BLD: 0.5 % (ref 0–1.9)
BUN SERPL-MCNC: 19 MG/DL (ref 8–23)
CALCIUM SERPL-MCNC: 7.9 MG/DL (ref 8.7–10.5)
CHLORIDE SERPL-SCNC: 106 MMOL/L (ref 95–110)
CO2 SERPL-SCNC: 27 MMOL/L (ref 23–29)
CREAT SERPL-MCNC: 0.9 MG/DL (ref 0.5–1.4)
DIFFERENTIAL METHOD: ABNORMAL
EOSINOPHIL # BLD AUTO: 0.2 K/UL (ref 0–0.5)
EOSINOPHIL NFR BLD: 2.5 % (ref 0–8)
ERYTHROCYTE [DISTWIDTH] IN BLOOD BY AUTOMATED COUNT: 14.6 % (ref 11.5–14.5)
EST. GFR  (AFRICAN AMERICAN): >60 ML/MIN/1.73 M^2
EST. GFR  (NON AFRICAN AMERICAN): >60 ML/MIN/1.73 M^2
GLUCOSE SERPL-MCNC: 161 MG/DL (ref 70–110)
HCT VFR BLD AUTO: 33.5 % (ref 40–54)
HGB BLD-MCNC: 10.9 G/DL (ref 14–18)
IMM GRANULOCYTES # BLD AUTO: 0.02 K/UL (ref 0–0.04)
IMM GRANULOCYTES NFR BLD AUTO: 0.3 % (ref 0–0.5)
LYMPHOCYTES # BLD AUTO: 1.5 K/UL (ref 1–4.8)
LYMPHOCYTES NFR BLD: 23.7 % (ref 18–48)
MCH RBC QN AUTO: 30.1 PG (ref 27–31)
MCHC RBC AUTO-ENTMCNC: 32.5 G/DL (ref 32–36)
MCV RBC AUTO: 93 FL (ref 82–98)
MONOCYTES # BLD AUTO: 0.5 K/UL (ref 0.3–1)
MONOCYTES NFR BLD: 7.8 % (ref 4–15)
NEUTROPHILS # BLD AUTO: 4 K/UL (ref 1.8–7.7)
NEUTROPHILS NFR BLD: 65.2 % (ref 38–73)
NRBC BLD-RTO: 0 /100 WBC
PLATELET # BLD AUTO: 176 K/UL (ref 150–450)
PMV BLD AUTO: 9.8 FL (ref 9.2–12.9)
POCT GLUCOSE: 153 MG/DL (ref 70–110)
POCT GLUCOSE: 156 MG/DL (ref 70–110)
POCT GLUCOSE: 240 MG/DL (ref 70–110)
POTASSIUM SERPL-SCNC: 3.9 MMOL/L (ref 3.5–5.1)
RBC # BLD AUTO: 3.62 M/UL (ref 4.6–6.2)
SODIUM SERPL-SCNC: 139 MMOL/L (ref 136–145)
WBC # BLD AUTO: 6.12 K/UL (ref 3.9–12.7)

## 2022-05-18 PROCEDURE — 25000003 PHARM REV CODE 250: Performed by: HOSPITALIST

## 2022-05-18 PROCEDURE — 99223 PR INITIAL HOSPITAL CARE,LEVL III: ICD-10-PCS | Mod: ,,, | Performed by: NURSE PRACTITIONER

## 2022-05-18 PROCEDURE — 94761 N-INVAS EAR/PLS OXIMETRY MLT: CPT

## 2022-05-18 PROCEDURE — 80048 BASIC METABOLIC PNL TOTAL CA: CPT | Performed by: STUDENT IN AN ORGANIZED HEALTH CARE EDUCATION/TRAINING PROGRAM

## 2022-05-18 PROCEDURE — 25000003 PHARM REV CODE 250: Performed by: STUDENT IN AN ORGANIZED HEALTH CARE EDUCATION/TRAINING PROGRAM

## 2022-05-18 PROCEDURE — 63600175 PHARM REV CODE 636 W HCPCS: Performed by: HOSPITALIST

## 2022-05-18 PROCEDURE — 99223 1ST HOSP IP/OBS HIGH 75: CPT | Mod: ,,, | Performed by: NURSE PRACTITIONER

## 2022-05-18 PROCEDURE — 90834 PR PSYCHOTHERAPY W/PATIENT, 45 MIN: ICD-10-PCS | Mod: ,,, | Performed by: STUDENT IN AN ORGANIZED HEALTH CARE EDUCATION/TRAINING PROGRAM

## 2022-05-18 PROCEDURE — 90834 PSYTX W PT 45 MINUTES: CPT | Mod: ,,, | Performed by: STUDENT IN AN ORGANIZED HEALTH CARE EDUCATION/TRAINING PROGRAM

## 2022-05-18 PROCEDURE — 99239 PR HOSPITAL DISCHARGE DAY,>30 MIN: ICD-10-PCS | Mod: ,,, | Performed by: STUDENT IN AN ORGANIZED HEALTH CARE EDUCATION/TRAINING PROGRAM

## 2022-05-18 PROCEDURE — 97162 PT EVAL MOD COMPLEX 30 MIN: CPT

## 2022-05-18 PROCEDURE — 36415 COLL VENOUS BLD VENIPUNCTURE: CPT | Performed by: STUDENT IN AN ORGANIZED HEALTH CARE EDUCATION/TRAINING PROGRAM

## 2022-05-18 PROCEDURE — 99239 HOSP IP/OBS DSCHRG MGMT >30: CPT | Mod: ,,, | Performed by: STUDENT IN AN ORGANIZED HEALTH CARE EDUCATION/TRAINING PROGRAM

## 2022-05-18 PROCEDURE — 85025 COMPLETE CBC W/AUTO DIFF WBC: CPT | Performed by: STUDENT IN AN ORGANIZED HEALTH CARE EDUCATION/TRAINING PROGRAM

## 2022-05-18 PROCEDURE — 1111F DSCHRG MED/CURRENT MED MERGE: CPT | Mod: CPTII,,, | Performed by: STUDENT IN AN ORGANIZED HEALTH CARE EDUCATION/TRAINING PROGRAM

## 2022-05-18 PROCEDURE — 97530 THERAPEUTIC ACTIVITIES: CPT

## 2022-05-18 PROCEDURE — 1111F PR DISCHARGE MEDS RECONCILED W/ CURRENT OUTPATIENT MED LIST: ICD-10-PCS | Mod: CPTII,,, | Performed by: STUDENT IN AN ORGANIZED HEALTH CARE EDUCATION/TRAINING PROGRAM

## 2022-05-18 RX ORDER — INSULIN ASPART 100 [IU]/ML
8-16 INJECTION, SOLUTION INTRAVENOUS; SUBCUTANEOUS 3 TIMES DAILY
Qty: 43.2 ML | Refills: 3 | Status: ON HOLD
Start: 2022-05-18 | End: 2022-10-14 | Stop reason: HOSPADM

## 2022-05-18 RX ORDER — TALC
6 POWDER (GRAM) TOPICAL NIGHTLY PRN
Status: DISCONTINUED | OUTPATIENT
Start: 2022-05-18 | End: 2022-05-18 | Stop reason: HOSPADM

## 2022-05-18 RX ADMIN — SERTRALINE HYDROCHLORIDE 25 MG: 25 TABLET ORAL at 08:05

## 2022-05-18 RX ADMIN — SUCRALFATE 1 G: 1 SUSPENSION ORAL at 06:05

## 2022-05-18 RX ADMIN — PANTOPRAZOLE SODIUM 40 MG: 40 TABLET, DELAYED RELEASE ORAL at 08:05

## 2022-05-18 RX ADMIN — LACOSAMIDE 100 MG: 50 TABLET, FILM COATED ORAL at 08:05

## 2022-05-18 RX ADMIN — ATORVASTATIN CALCIUM 40 MG: 20 TABLET, FILM COATED ORAL at 08:05

## 2022-05-18 RX ADMIN — AMIODARONE HYDROCHLORIDE 200 MG: 200 TABLET ORAL at 08:05

## 2022-05-18 RX ADMIN — Medication 6 MG: at 01:05

## 2022-05-18 RX ADMIN — INSULIN ASPART 2 UNITS: 100 INJECTION, SOLUTION INTRAVENOUS; SUBCUTANEOUS at 08:05

## 2022-05-18 RX ADMIN — APIXABAN 5 MG: 5 TABLET, FILM COATED ORAL at 08:05

## 2022-05-18 RX ADMIN — INSULIN ASPART 4 UNITS: 100 INJECTION, SOLUTION INTRAVENOUS; SUBCUTANEOUS at 03:05

## 2022-05-18 RX ADMIN — LOSARTAN POTASSIUM 25 MG: 25 TABLET, FILM COATED ORAL at 08:05

## 2022-05-18 RX ADMIN — ONDANSETRON 4 MG: 2 INJECTION INTRAMUSCULAR; INTRAVENOUS at 12:05

## 2022-05-18 RX ADMIN — TAMSULOSIN HYDROCHLORIDE 0.4 MG: 0.4 CAPSULE ORAL at 08:05

## 2022-05-18 RX ADMIN — CLOPIDOGREL 75 MG: 75 TABLET, FILM COATED ORAL at 08:05

## 2022-05-18 RX ADMIN — INSULIN ASPART 2 UNITS: 100 INJECTION, SOLUTION INTRAVENOUS; SUBCUTANEOUS at 01:05

## 2022-05-18 RX ADMIN — METOPROLOL SUCCINATE 50 MG: 50 TABLET, EXTENDED RELEASE ORAL at 08:05

## 2022-05-18 RX ADMIN — Medication: at 09:05

## 2022-05-18 RX ADMIN — INSULIN DETEMIR 6 UNITS: 100 INJECTION, SOLUTION SUBCUTANEOUS at 08:05

## 2022-05-18 NOTE — CONSULTS
[facility] - Telemetry Stepdown  [facility]  Consult Note    Patient Name: Kamar Muñoz  MRN: [id number]  Admission Date: 5/15/2022  Hospital Length of Stay: 3 days  Attending Physician: Minnie Collazo MD  Primary Care Provider: Basim Guerrero MD     Consults  Subjective:     History of Present Illness:  Kamar Muñoz is a 76 year old male with PMHx of poorly controlled DM2 with recurrent DKA, CAD, HLD, paroxysmal Afib, and seizures who presents to the ED from NH due to elevated BG. Pt was seen in ED yesterday/last night for constipation and BRBPR which was thought to be 2/2 to straining. Pt was discharged back to NH early this morning. Today at NH, pt BG was noted to be >500, so the patient was sent back to the ED. Pt. Was seen in ED ~11pm last night and discharged back to NH at 6am this morning. It is unclear if he missed his basal insulin at NH (6 units levemir BID) last night prior to coming to the ED or this morning after returning to NH. At time of my interview, pt is resting comfortably on insulin gtt. Pt does complain of some nausea and mild abdominal cramps. No reported fevers, chills, cough. Wound care consulted for evaluation of skin injury.      Scheduled Meds:   amiodarone  200 mg Oral Daily    apixaban  5 mg Oral BID    atorvastatin  40 mg Oral Daily    balsam peru-castor oiL   Topical (Top) BID    clopidogreL  75 mg Oral Daily    insulin aspart U-100  8-16 Units Subcutaneous TIDWM    insulin detemir U-100  6 Units Subcutaneous BID    lacosamide  100 mg Oral Q12H    losartan  25 mg Oral Daily    metoprolol succinate  50 mg Oral Daily    pantoprazole  40 mg Oral Daily    sertraline  25 mg Oral Daily    sucralfate  1 g Oral QID (AC & HS)    tamsulosin  0.4 mg Oral Daily     Continuous Infusions:  PRN Meds:acetaminophen, dextrose 10%, dextrose 10%, glucagon (human recombinant), glucose, glucose, insulin aspart U-100, insulin aspart U-100, melatonin, ondansetron, sodium  chloride 0.9%, sodium chloride 0.9%    Review of patient's allergies indicates:   Allergen Reactions    Iodine      Other reaction(s): swelling  Other reaction(s): Itching  Other reaction(s): Rash        Past Medical History:   Diagnosis Date    Arthritis     Coronary artery disease     COVID-19 virus infection 2022    Diabetes mellitus type II     Embolic stroke involving left cerebellar artery 2022    Hyperlipidemia     Hypertension     Kidney stone     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 2012    STEMI involving right coronary artery 2022    Type II or unspecified type diabetes mellitus with neurological manifestations, uncontrolled(250.62) 3/8/2013    Urinary tract infection      Past Surgical History:   Procedure Laterality Date    BACK SURGERY      CATARACT EXTRACTION W/  INTRAOCULAR LENS IMPLANT Right     Per Dr Romero note 2018    COLONOSCOPY N/A 2019    Procedure: COLONOSCOPY Suprep;  Surgeon: Anh Johnson MD;  Location: [facility] ENDO;  Service: Endoscopy;  Laterality: N/A;    EYE SURGERY      HERNIA REPAIR      LEFT HEART CATHETERIZATION Left 2022    Procedure: CATHETERIZATION, HEART, LEFT;  Surgeon: Will Hurst III, MD;  Location: [facility] CATH LAB/EP;  Service: Cardiology;  Laterality: Left;    renal stones      SHOULDER OPEN ROTATOR CUFF REPAIR         Family History       Problem Relation (Age of Onset)    Diabetes Father          Tobacco Use    Smoking status: Former Smoker     Packs/day: 1.50     Years: 25.00     Pack years: 37.50     Quit date: 1983     Years since quittin.4    Smokeless tobacco: Never Used   Substance and Sexual Activity    Alcohol use: No    Drug use: No    Sexual activity: Yes     Partners: Female     Review of Systems   Skin:  Positive for wound.     Objective:     Vital Signs (Most Recent):  Temp: 98.2 °F (36.8 °C) (22 1120)  Pulse: (!) 55 (22 1120)  Resp: 18 (22 1120)  BP: (!) 156/75  (05/18/22 1120)  SpO2: (!) 94 % (05/18/22 1120)   Vital Signs (24h Range):  Temp:  [97.1 °F (36.2 °C)-98.2 °F (36.8 °C)] 98.2 °F (36.8 °C)  Pulse:  [55-60] 55  Resp:  [15-18] 18  SpO2:  [90 %-100 %] 94 %  BP: (125-163)/(61-79) 156/75     Weight: 71.4 kg (157 lb 6.4 oz)  Body mass index is 20.21 kg/m².  Physical Exam  Constitutional:       Appearance: Normal appearance.   Skin:     General: Skin is warm and dry.      Findings: Lesion present.   Neurological:      Mental Status: He is alert.       Laboratory:  All pertinent labs reviewed within the last 24 hours.    Diagnostic Results:  None        Assessment/Plan:          [facility] Skin Integrity Evaluation    Skin Integrity [facility] evaluation of patient as part of the comprehensive skin care team.   He has been admitted for 3 days. Skin injury was noted on 5/16/22. POA yes.    L buttocks          At high risk for skin breakdown  - consult received for evaluation of skin injury to buttocks.  - pt well known to wound care dept from previous admissions.   - recently diagnosed with unstageable pressure injury to left buttocks placing pt at high risk for injury. Now with visible scar tissue present.  - blanchable redness noted to buttocks. Redness subsided on today's exam compared to yesterday by wound care RN.  - continue BPCO (Venelex) bid/prn.   - strict q2h turns.  - encourage pt out of bed to chair and participation in therapy sessions.  - currently on prosper surface with waffle overlay.  - foam dressing in place.  - condom cath intact.   - nursing to maintain pressure injury prevention measures and continue scheduled wound care per orders.         Thank you for your consult. I will follow-up with patient. Please contact us if you have any additional questions.      Ashleigh Tesfaye NP  [facility]  [facility] - Telemetry Stepdown

## 2022-05-18 NOTE — PT/OT/SLP EVAL
"Physical Therapy Evaluation    Patient Name:  Kamar Muñoz   MRN:  [id number]    Recommendations:     Discharge Recommendations:  nursing facility, skilled   Discharge Equipment Recommendations:  (TBD)   Barriers to discharge: Inaccessible home, Decreased caregiver support and increased assistance required    Assessment:       Kamar Muñoz is a 76 y.o. male admitted with a medical diagnosis of Diabetic ketoacidosis without coma associated with type 2 diabetes mellitus.  He presents with the following impairments/functional limitations:  weakness, impaired balance, decreased safety awareness, impaired endurance, pain, impaired functional mobilty, gait instability.  Pt participated in functional mobility training with encouragement, c/o ongoing fatigue and weakness. Pt able to perform bed mobility, transfers, and lateral steps at EOB with variable levels of assist. Plan to return to SNF for ongoing therapy when medically ready. Pt continues to present below their independent prior functional baseline. Pt would benefit from continued, skilled PT while in house to address the above listed impairments, further progress mobility as able, return towards highest level of function.      Rehab Prognosis: Good; patient would benefit from acute skilled PT services 3 x/week to address these deficits and reach maximum level of function.  Patient is most appropriate to go to nursing facility, skilled .  Recent Surgery: * No surgery found *      Plan:     During this hospitalization, patient to be seen 3 x/week to address the identified rehab impairments via gait training, therapeutic activities, therapeutic exercises, neuromuscular re-education and progress toward the following goals:    · Plan of Care Expires:  06/17/22    Subjective     Subjective:"I'll try and move"  Pt goal: none defined  Pain/Comfort:  · Pain Rating 1: other (see comments) (did not rate, c/o general back and bilateral foot pain, states thats " chronic)  · Pain Addressed 1: Reposition, Distraction, Cessation of Activity    Patients cultural, spiritual, Yarsani conflicts given the current situation: no    Living Environment: Pt admitted from SNF, otherwise lives with wife in [facility] with threshold to enter  Prior level of function:  Independent prior to recent hospitalizations/SNF placement, driving  Falls within the last 90 days: denies  Equipment owned: walker, rolling  Support available upon discharge: family    Objective:     Communicated with nursing prior to session.  Patient found supine with peripheral IV, lopez catheter  upon PT entry to room.     General Precautions: Standard, fall   Orthopedic Precautions:N/A   Braces: N/A     Exams:  · Cognition: appropriate and cooperative, flat affect  · RLE ROM: WFL  · RLE Strength: WFL  · LLE ROM: WFL  · LLE Strength: WFL  · Posture: rounded shoulders, forward head, pt appears frail  · Integumentary: grossly intact  · Sensation: denies numbness/tingling    Functional Mobility:  · Bed Mobility: min assist for supine<>sit with cueing to initiate mobility  · Transfers: mod assist for sit>stand from EOB with use of RW, increased assistance to clear buttocks. Min assist for stand>sit. Cueing for all mobility and technique.  · Gait: Pt took ~3 lateral side steps at EOB with RW and min assist, cueing to initiate movement with manual facilitation provided   · Balance:   · Sitting: supervision while seated EOB  · Standing: min to mod assist when standing with use of RW, posterior sway evident while standing, benefits from manual and tactile feedback to improve trunk control    Education:  Education provided to pt re: d/c planning, PT POC, benefits of mobility, goals for mobility, pt's goals. Pt endorses understanding.     AM-PAC 6 CLICK MOBILITY  Total Score:13     Patient left supine with all lines intact and call button in reach.    GOALS:   Multidisciplinary Problems     Physical Therapy Goals        Problem:  Physical Therapy    Goal Priority Disciplines Outcome Goal Variances Interventions   Physical Therapy Goal     PT, PT/OT Ongoing, Progressing     Description: Goals to be met by: 22    Patient will increase functional independence with mobility by performin. Pt will perform supine<>sit with supervision to improve independence with mobility  2. Pt will perform functional transfers with supervision   3. Pt will ambulate >100 ft feet with LRAD and supervision to safely perform household distance ambulation                    History:     Past Medical History:   Diagnosis Date    Arthritis     Coronary artery disease     COVID-19 virus infection 2022    Diabetes mellitus type II     Embolic stroke involving left cerebellar artery 2022    Hyperlipidemia     Hypertension     Kidney stone     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 2012    STEMI involving right coronary artery 2022    Type II or unspecified type diabetes mellitus with neurological manifestations, uncontrolled(250.62) 3/8/2013    Urinary tract infection        Past Surgical History:   Procedure Laterality Date    BACK SURGERY      CATARACT EXTRACTION W/  INTRAOCULAR LENS IMPLANT Right     Per Dr Romero note 2018    COLONOSCOPY N/A 2019    Procedure: COLONOSCOPY Suprep;  Surgeon: Anh Johnson MD;  Location: [facility] ENDO;  Service: Endoscopy;  Laterality: N/A;    EYE SURGERY      HERNIA REPAIR      LEFT HEART CATHETERIZATION Left 2022    Procedure: CATHETERIZATION, HEART, LEFT;  Surgeon: Will Hurst III, MD;  Location: [facility] CATH LAB/EP;  Service: Cardiology;  Laterality: Left;    renal stones      SHOULDER OPEN ROTATOR CUFF REPAIR         Time Tracking:     PT Received On: 22  PT Start Time: 905     PT Stop Time: 923  PT Total Time (min): 18 min     Billable Minutes: Evaluation 1 unit and Therapeutic Activity 8 min      Shahida Dash, PT, DPT, GCS  2022

## 2022-05-18 NOTE — PLAN OF CARE
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       Code Status: DNR    Patient is homebound due to:  Diabetic ketoacidosis without coma associated with type 2 diabetes mellitus    Allergies:  Review of patient's allergies indicates:   Allergen Reactions    Iodine      Other reaction(s): swelling  Other reaction(s): Itching  Other reaction(s): Rash       Vitals:  Routine    Diet: No concentrated sugars    Activities:   Activity as tolerated    Labs:  n/a    Nursing Precautions:  Aspiration , Fall and Pressure ulcer prevention    Consults:   PT to evaluate and treat- 3 times a week, OT to evaluate and treat- 3 times a week and Wound Care     Miscellaneous Care: Routine Skin for Bedridden Patients:  Apply moisture barrier cream to all                   Diabetes Care:  Fingerstick blood sugar AC and HS      Medications: Discontinue all previous medication orders, if any. See new list below.     Medication List      START taking these medications    insulin detemir U-100 100 unit/mL (3 mL) Inpn pen  Commonly known as: Levemir FLEXTOUCH  Inject 6 Units into the skin 2 (two) times  daily.        CHANGE how you take these medications    * insulin aspart U-100 100 unit/mL (3 mL) Inpn pen  Commonly known as: NovoLOG  Inject 4 Units into the skin with snacks (>200).  What changed:   · Another medication with the same name was changed. Make sure you understand how and when to take each.  · Another medication with the same name was removed. Continue taking this medication, and follow the directions you see here.     * insulin aspart U-100 100 unit/mL (3 mL) Inpn pen  Commonly known as: NovoLOG  Inject 8-16 Units into the skin 3 (three) times daily.  What changed:   · how much to take  · when to take this  · reasons to take this  · additional instructions  · Another medication with the same name was removed. Continue taking this medication, and follow the directions you see here.         * This list has 2 medication(s) that are the same as other medications prescribed for you. Read the directions carefully, and ask your doctor or other care provider to review them with you.            CONTINUE taking these medications    amiodarone 200 MG Tab  Commonly known as: PACERONE  Take 1 tablet (200 mg total) by mouth once daily.     atorvastatin 40 MG tablet  Commonly known as: LIPITOR  Take 1 tablet (40 mg total) by mouth once daily.     blood sugar diagnostic Strp  1 strip by Misc.(Non-Drug; Combo Route) route 2 (two) times a day.     cetirizine 10 MG tablet  Commonly known as: ZYRTEC  Take 1 tablet (10 mg total) by mouth every evening.     clopidogreL 75 mg tablet  Commonly known as: PLAVIX  Take 1 tablet (75 mg total) by mouth once daily.     diclofenac sodium 1 % Gel  Commonly known as: VOLTAREN  Apply 4 g topically 3 (three) times daily. Apply to sacrun closed skin/buttocks     ELIQUIS 5 mg Tab  Generic drug: apixaban  Take 1 tablet (5 mg total) by mouth 2 (two) times daily.     ergocalciferol 50,000 unit Cap  Commonly known as: ERGOCALCIFEROL  Take 1 capsule (50,000 Units total) by mouth every 7  "days.     * glucose 4 GM chewable tablet  Take 4 tablets (16 g total) by mouth as needed for Low blood sugar (50 - 70).     * glucose 4 GM chewable tablet  Take 6 tablets (24 g total) by mouth as needed for Low blood sugar (< 50).     lacosamide 100 mg Tab  Commonly known as: VIMPAT  Take 1 tablet (100 mg total) by mouth every 12 (twelve) hours.     lancets Misc  Commonly known as: ONETOUCH ULTRASOFT LANCETS  1 lancet by Misc.(Non-Drug; Combo Route) route 2 (two) times a day.     losartan 25 MG tablet  Commonly known as: COZAAR  Take 1 tablet (25 mg total) by mouth once daily.     magnesium oxide 400 mg (241.3 mg magnesium) tablet  Commonly known as: MAG-OX  Take 1 tablet (400 mg total) by mouth 2 (two) times daily.     metoprolol succinate 50 MG 24 hr tablet  Commonly known as: TOPROL-XL  Take 1 tablet (50 mg total) by mouth once daily.     MULTIVITAMIN ORAL  Take 1 tablet by mouth once daily.     nitroGLYCERIN 0.4 MG SL tablet  Commonly known as: NITROSTAT  Place 1 tablet (0.4 mg total) under the tongue every 5 (five) minutes as needed for Chest pain.     ondansetron 4 MG Tbdl  Commonly known as: ZOFRAN-ODT  Take 1 tablet (4 mg total) by mouth 3 (three) times daily with meals.     pantoprazole 40 MG tablet  Commonly known as: PROTONIX  Take 1 tablet (40 mg total) by mouth once daily.     * pen needle, diabetic 30 gauge x 5/16" Ndle  Commonly known as: PEN NEEDLE  1 Units by Misc.(Non-Drug; Combo Route) route 3 (three) times daily.     * BD ULTRA-FINE SHORT PEN NEEDLE 31 gauge x 5/16" Ndle  Generic drug: pen needle, diabetic  3 (three) times daily.     polycarbophil 625 mg tablet  Commonly known as: FIBERCON  Take 1 tablet by mouth once daily.     senna-docusate 8.6-50 mg 8.6-50 mg per tablet  Commonly known as: PERICOLACE  Take 1 tablet by mouth once daily.     sertraline 25 MG tablet  Commonly known as: ZOLOFT  Take 1 tablet (25 mg total) by mouth once daily.     sucralfate 1 gram tablet  Commonly known as: " CARAFATE  Take 1 tablet (1 g total) by mouth 3 (three) times daily before meals.     tamsulosin 0.4 mg Cap  Commonly known as: FLOMAX  Take 1 capsule (0.4 mg total) by mouth once daily.         * This list has 4 medication(s) that are the same as other medications prescribed for you. Read the directions carefully, and ask your doctor or other care provider to review them with you.            STOP taking these medications    albuterol 90 mcg/actuation inhaler  Commonly known as: PROVENTIL/VENTOLIN HFA     aspirin 81 MG EC tablet  Commonly known as: ECOTRIN     DEXCOM G5  Misc  Generic drug: blood-glucose meter,continuous     DEXCOM G5 TRANSMITTER Stephanie  Generic drug: blood-glucose transmitter     diltiaZEM 120 MG Cp24  Commonly known as: CARDIZEM CD     gabapentin 100 MG capsule  Commonly known as: NEURONTIN     insulin glargine 100 unit/mL injection  Commonly known as: Lantus     metFORMIN 1000 MG tablet  Commonly known as: GLUCOPHAGE              Immunizations Administered as of 5/18/2022     Name Date Dose VIS Date Route Exp Date    COVID-19, MRNA, LN-S, PF (Pfizer) (Purple Cap) 10/29/2021 0.3 mL -- Intramuscular --    Site: Left arm     : Pf[employer] Inc     Lot: [id number]     COVID-19, MRNA, LN-S, PF (Pfizer) (Purple Cap) 1/29/2021 0.3 mL -- Intramuscular --    Site: Left deltoid     : Pf[employer] Inc     Lot: [id number]           This patient has had both covid vaccinations    Some patients may experience side effects after vaccination.  These may include fever, headache, muscle or joint aches.  Most symptoms resolve with 24-48 hours and do not require urgent medical evaluation unless they persist for more than 72 hours or symptoms are concerning for an unrelated medical condition.          _________________________________  Minnie Collazo MD  05/18/2022

## 2022-05-18 NOTE — PLAN OF CARE
Problem: Physical Therapy  Goal: Physical Therapy Goal  Description: Goals to be met by: 22    Patient will increase functional independence with mobility by performin. Pt will perform supine<>sit with supervision to improve independence with mobility  2. Pt will perform functional transfers with supervision   3. Pt will ambulate >100 ft feet with LRAD and supervision to safely perform household distance ambulation   Outcome: Ongoing, Progressing     Evaluation completed and goals currently appropriate at this time.    Shahida Dash, PT, DPT, GCS  2022

## 2022-05-18 NOTE — PLAN OF CARE
Problem: Adult Inpatient Plan of Care  Goal: Absence of Hospital-Acquired Illness or Injury  Intervention: Identify and Manage Fall Risk  Flowsheets (Taken 5/18/2022 0554)  Safety Promotion/Fall Prevention: assistive device/personal item within reach  Intervention: Prevent Skin Injury  Flowsheets (Taken 5/18/2022 0554)  Body Position: position changed independently  Goal: Optimal Comfort and Wellbeing  Intervention: Provide Person-Centered Care  Flowsheets (Taken 5/18/2022 0554)  Trust Relationship/Rapport:   questions encouraged   care explained   choices provided   reassurance provided   emotional support provided   thoughts/feelings acknowledged   empathic listening provided   questions answered

## 2022-05-18 NOTE — PLAN OF CARE
[facility] - Tele[facility] Stepdown  Discharge Final Note    Primary Care Provider: Basim Guerrero MD    Expected Discharge Date: 5/18/2022    Final Discharge Note (most recent)     Final Note - 05/18/22 1509        Final Note    Assessment Type Final Discharge Note     Anticipated Discharge Disposition assisted Nursing Home        Post-Acute Status    Post-Acute Authorization Placement     Post-Acute Placement Status Set-up Complete/Auth obtained   [facility]    Discharge Delays None known at this time               Per Renée at [facility], nurse can call report to [phone] and patient will return to room 118B. Notified nurse.    SW arranged stretcher transport via [facility]. Requested  time is 3:30PM. Requested  time does not guarantee arrival time.    Updated patient at bedside.    Bess Mathews LMSW  [facility]  Ext. [phone]

## 2022-05-18 NOTE — HPI
Kamar Muñoz is a 76 year old male with PMHx of poorly controlled DM2 with recurrent DKA, CAD, HLD, paroxysmal Afib, and seizures who presents to the ED from NH due to elevated BG. Pt was seen in ED yesterday/last night for constipation and BRBPR which was thought to be 2/2 to straining. Pt was discharged back to NH early this morning. Today at NH, pt BG was noted to be >500, so the patient was sent back to the ED. Pt. Was seen in ED ~11pm last night and discharged back to NH at 6am this morning. It is unclear if he missed his basal insulin at NH (6 units levemir BID) last night prior to coming to the ED or this morning after returning to NH. At time of my interview, pt is resting comfortably on insulin gtt. Pt does complain of some nausea and mild abdominal cramps. No reported fevers, chills, cough. Wound care consulted for evaluation of skin injury.

## 2022-05-18 NOTE — SUBJECTIVE & OBJECTIVE
Scheduled Meds:   amiodarone  200 mg Oral Daily    apixaban  5 mg Oral BID    atorvastatin  40 mg Oral Daily    balsam peru-castor oiL   Topical (Top) BID    clopidogreL  75 mg Oral Daily    insulin aspart U-100  8-16 Units Subcutaneous TIDWM    insulin detemir U-100  6 Units Subcutaneous BID    lacosamide  100 mg Oral Q12H    losartan  25 mg Oral Daily    metoprolol succinate  50 mg Oral Daily    pantoprazole  40 mg Oral Daily    sertraline  25 mg Oral Daily    sucralfate  1 g Oral QID (AC & HS)    tamsulosin  0.4 mg Oral Daily     Continuous Infusions:  PRN Meds:acetaminophen, dextrose 10%, dextrose 10%, glucagon (human recombinant), glucose, glucose, insulin aspart U-100, insulin aspart U-100, melatonin, ondansetron, sodium chloride 0.9%, sodium chloride 0.9%    Review of patient's allergies indicates:   Allergen Reactions    Iodine      Other reaction(s): swelling  Other reaction(s): Itching  Other reaction(s): Rash        Past Medical History:   Diagnosis Date    Arthritis     Coronary artery disease     COVID-19 virus infection 2/18/2022    Diabetes mellitus type II     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    Hyperlipidemia     Hypertension     Kidney stone     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 8/2/2012    STEMI involving right coronary artery 1/9/2022    Type II or unspecified type diabetes mellitus with neurological manifestations, uncontrolled(250.62) 3/8/2013    Urinary tract infection      Past Surgical History:   Procedure Laterality Date    BACK SURGERY      CATARACT EXTRACTION W/  INTRAOCULAR LENS IMPLANT Right     Per Dr Romero note 11/2018    COLONOSCOPY N/A 1/28/2019    Procedure: COLONOSCOPY Suprep;  Surgeon: Anh Johnson MD;  Location: [facility] ENDO;  Service: Endoscopy;  Laterality: N/A;    EYE SURGERY      HERNIA REPAIR      LEFT HEART CATHETERIZATION Left 1/9/2022    Procedure: CATHETERIZATION, HEART, LEFT;  Surgeon: Will Hurst III, MD;  Location: [facility] CATH  LAB/EP;  Service: Cardiology;  Laterality: Left;    renal stones      SHOULDER OPEN ROTATOR CUFF REPAIR         Family History       Problem Relation (Age of Onset)    Diabetes Father          Tobacco Use    Smoking status: Former Smoker     Packs/day: 1.50     Years: 25.00     Pack years: 37.50     Quit date: 1983     Years since quittin.4    Smokeless tobacco: Never Used   Substance and Sexual Activity    Alcohol use: No    Drug use: No    Sexual activity: Yes     Partners: Female     Review of Systems   Skin:  Positive for wound.     Objective:     Vital Signs (Most Recent):  Temp: 98.2 °F (36.8 °C) (22 1120)  Pulse: (!) 55 (22 1120)  Resp: 18 (22 1120)  BP: (!) 156/75 (22 1120)  SpO2: (!) 94 % (22 1120)   Vital Signs (24h Range):  Temp:  [97.1 °F (36.2 °C)-98.2 °F (36.8 °C)] 98.2 °F (36.8 °C)  Pulse:  [55-60] 55  Resp:  [15-18] 18  SpO2:  [90 %-100 %] 94 %  BP: (125-163)/(61-79) 156/75     Weight: 71.4 kg (157 lb 6.4 oz)  Body mass index is 20.21 kg/m².  Physical Exam  Constitutional:       Appearance: Normal appearance.   Skin:     General: Skin is warm and dry.      Findings: Lesion present.   Neurological:      Mental Status: He is alert.       Laboratory:  All pertinent labs reviewed within the last 24 hours.    Diagnostic Results:  None

## 2022-05-18 NOTE — PLAN OF CARE
Endocrinology managing Ketosis-prone diabetes mellitus  - Blood sugars at goal today  - Would recommend to continue Levemir 6 units twice daily and on insulin aspart 8-16 units (please give 8 units if eats <25%, 12 units with 50 % intake and full dose if eats 100%),  in addition to moderate correction with with meals  -  Aspart 4 units PRN in place for nightly and in between meal snacks  -  If BG >350 do not feed the patient. Please give a correction scale and re-start meal once BG closer to 200  -  Will observe trend and adjust insulin accordingly  -  Would likely better benefit from Carbohydrate Ratio instead with carbohydrate counting due to dietary indiscretion and variable intake and boosts with meals  -  He has had large fluctuations in between hyperglycemia and hypoglycemia during prandial times       *For discharge, can consider sending on above regimen      *Fixed doses of insulin we have recommended are to cover meals containing 60 g of carbohydrate and it is very important that meals are consistently containing 60 g of carbs.  If patient exceed 60 g of carbohydrate per meal or consumes any carbohydrates between meals without insulin coverage this will likely again lead to significant hyperglycemia. In addition if he eats < 50 % of meal to only give half the recommended pre-meal dose.

## 2022-05-18 NOTE — CONSULTS
"[facility] - Telemetry Stepdown  Psychology  Consult Note    Patient Name: Kamar Muñoz  MRN: [id number]   Patient Class: IP- Inpatient  Admission Date: 5/15/2022  Hospital Length of Stay: 3 days  Attending Physician: Minnie Collazo MD  Primary Care Provider: Basim Guerrero MD    Consults  History of Present Illness: 77 yo M with PMHx poorly controlled DM2 with recurrent DKA, CAD, HLD, paroxysmal Afib, and seizures who presents to the ED from NH due to elevated BG. Psychology consulted to assess and treat depressive symptoms. Patient is known to this provider from recent inpatient rehab stay in Feb 2022.    Symptoms  · Mood: Regular periods of low mood that may several hours, not all day. Also endorsed anhedonia and decreased appetite. Denied problems with concentration, sleep, energy, feelings of worthlessness/guilt./ Denied suicidal ideation.  · Anxiety: Denied anxiety symptoms.  · Pain: Rated current pain as 4/10. Denied concerns regarding pain management.  · Appetite: Decreased.  · Sleep: Described sleep as "hit or miss." Stated that he sleeps well 50% of nights. Reported difficulties due to frequent awakenings on 50% of nights.    Psychotherapeutics (From admission, onward)            Start     Stop Route Frequency Ordered    05/16/22 0900  sertraline tablet 25 mg         -- Oral Daily 05/15/22 2104        Intervention: Engaged patient in Motivational Interviewing intervention focused on coping with stress of hospitalization. Elicited description of past examples of effective coping. Also reviewed goal to improve hydration. Patient stated that he dislikes water. Typically drinks 2-3 8oz glasses of water per day and a 20oz Gatorade Zero. Patient stated that he is going to ask his daughter to buy a water flavoring product. He also decided to mix a little lemonade in his water as a strategy to increase water intake. Assisted patient in linking improved hydration to goal of decreased hospitalizations. "     Mental Status Exam  General Appearance:  unremarkable, age appropriate, good grooming and hygiene, dressed in hospital gown   Speech: Normal rate, volume and prosody      Level of Cooperation: cooperative      Thought Processes: normal and logical   Mood: dysphoric      Thought Content: normal, no suicidality, no homicidality, delusions, or paranoia   Affect: congruent and appropriate, flat   Orientation: Oriented x 4   Memory: No deficits noted.   Attention & Concentration: intact   Fund of General Knowledge: intact and appropriate to age and level of education   Abstract Reasoning: No deficits noted.   Judgment & Insight: good   Language: intact   Behavioral Observations: Laying with head of bed elevated. Good eye contact. No signs of psychomotor agitation or retardation.         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     Recommendations: Depression screening at outpatient follow up appts.      Thank you for the opportunity to participate in this patient's care.    Length of Service: 40 minutes    Ray Cohen  PhD  Psychology  [facility] - Tele[facility]

## 2022-05-18 NOTE — DISCHARGE SUMMARY
Discharge Summary  Department of Hospital Medicine      Date of Admit:  5/15/2022  Date of Discharge:  5/18/22    Chief Complaint/Reason for Admission:  Diabetic ketoacidosis without coma associated with type 2 diabetes mellitus    Discharge Diagnoses:   Principal Problem:  Diabetic ketoacidosis without coma associated with type 2 diabetes mellitus  Secondary Diagnoses:    Active Hospital Problems    Diagnosis  POA    *Diabetic ketoacidosis without coma associated with type 2 diabetes mellitus [E11.10]  Unknown    At high risk for skin breakdown [Z91.89]  Yes    Adjustment disorder with disturbance of conduct [F43.24]  Yes    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       Hospital Course:  77 yo M with PMHx poorly controlled DM2 with recurrent DKA, CAD, HLD, paroxysmal Afib, and seizures who presents to the ED from NH due to elevated BG. Pt. Was seen in ED yesterday/last night for constipation and BRBPR which was thought to be 2/2 to straining. Pt. Was discharged back to NH early this morning. Today at NH, pt. BG was noted to be >500, so the patient was sent back to the ED. Pt. Was seen in ED ~11pm last night and discharged back to NH at 6am this morning. It is unclear if he missed his basal insulin at NH (6 units levemir BID) last night prior to coming to the ED or this morning after returning to NH. At time of my interview, pt. Is resting comfortably on insulin gtt. Pt. Does complain of some nausea and mild abdominal cramps. No reported fevers, chills, cough. Pt. Was given a brown bomb enema last night with large stool  output and has had one more BM today.        Overview/Hospital Course:  Patient admitted for DKA.  Was quickly transitioned off of insulin drip and now on subcu insulin with anion gap closed.  Was seen by Endocrinology to manage insulin drip and transition patient to subcu insulin regimen, see below.  Blood sugars remained stable and he was deemed appropriate for discharge.    Endocrinology managing Ketosis-prone diabetes mellitus  - Would recommend to continue Levemir 6 units twice daily and on insulin aspart 8-16 units (please give 8 units if eats <25%, 12 units with 50 % intake and full dose if eats 100%),  in addition to moderate correction with with meals  -  Aspart 4 units PRN in place for nightly and in between meal snacks  -  If BG >350 do not feed the patient. Please give a correction scale and re-start meal once BG closer to 200  -  Would likely better benefit from Carbohydrate Ratio instead with carbohydrate counting due to dietary indiscretion and variable intake and boosts with meals  -  He has had large fluctuations in between hyperglycemia and hypoglycemia during prandial times       Consults: endocrinology  Procedures:  * No surgery found *  Significant Diagnostic Studies:  I have reviewed all pertinent lab results within the past 24 hours.  Imaging Results          X-Ray Chest 1 View (Final result)  Result time 05/16/22 01:35:49    Final result by Orville Estrada MD (05/16/22 01:35:49)                 Impression:      Chronic interstitial type changes with no convincing evidence of acute airspace disease.      Electronically signed by: Orville Estrada  Date:    05/16/2022  Time:    01:35             Narrative:    EXAMINATION:  XR CHEST 1 VIEW    CLINICAL HISTORY:  DKA, infectious work-up;    TECHNIQUE:  Single frontal view of the chest was performed.    COMPARISON:  04/11/2022    FINDINGS:  Heart and mediastinal contours appear stable.  Emphysematous and fibrotic changes appear stable since the  prior exam.  No new consolidation or effusion is seen.                                Discharge Medications:  Reconciled Home Medications:      Medication List      START taking these medications    insulin detemir U-100 100 unit/mL (3 mL) Inpn pen  Commonly known as: Levemir FLEXTOUCH  Inject 6 Units into the skin 2 (two) times daily.        CHANGE how you take these medications    * insulin aspart U-100 100 unit/mL (3 mL) Inpn pen  Commonly known as: NovoLOG  Inject 4 Units into the skin with snacks (>200).  What changed:   · Another medication with the same name was changed. Make sure you understand how and when to take each.  · Another medication with the same name was removed. Continue taking this medication, and follow the directions you see here.     * insulin aspart U-100 100 unit/mL (3 mL) Inpn pen  Commonly known as: NovoLOG  Inject 8-16 Units into the skin 3 (three) times daily.  What changed:   · how much to take  · when to take this  · reasons to take this  · additional instructions  · Another medication with the same name was removed. Continue taking this medication, and follow the directions you see here.         * This list has 2 medication(s) that are the same as other medications prescribed for you. Read the directions carefully, and ask your doctor or other care provider to review them with you.            CONTINUE taking these medications    amiodarone 200 MG Tab  Commonly known as: PACERONE  Take 1 tablet (200 mg total) by mouth once daily.     atorvastatin 40 MG tablet  Commonly known as: LIPITOR  Take 1 tablet (40 mg total) by mouth once daily.     blood sugar diagnostic Strp  1 strip by Misc.(Non-Drug; Combo Route) route 2 (two) times a day.     cetirizine 10 MG tablet  Commonly known as: ZYRTEC  Take 1 tablet (10 mg total) by mouth every evening.     clopidogreL 75 mg tablet  Commonly known as: PLAVIX  Take 1 tablet (75 mg total) by mouth once daily.     diclofenac sodium 1 % Gel  Commonly  "known as: VOLTAREN  Apply 4 g topically 3 (three) times daily. Apply to sacrun closed skin/buttocks     ELIQUIS 5 mg Tab  Generic drug: apixaban  Take 1 tablet (5 mg total) by mouth 2 (two) times daily.     ergocalciferol 50,000 unit Cap  Commonly known as: ERGOCALCIFEROL  Take 1 capsule (50,000 Units total) by mouth every 7 days.     * glucose 4 GM chewable tablet  Take 4 tablets (16 g total) by mouth as needed for Low blood sugar (50 - 70).     * glucose 4 GM chewable tablet  Take 6 tablets (24 g total) by mouth as needed for Low blood sugar (< 50).     lacosamide 100 mg Tab  Commonly known as: VIMPAT  Take 1 tablet (100 mg total) by mouth every 12 (twelve) hours.     lancets Misc  Commonly known as: ONETOUCH ULTRASOFT LANCETS  1 lancet by Misc.(Non-Drug; Combo Route) route 2 (two) times a day.     losartan 25 MG tablet  Commonly known as: COZAAR  Take 1 tablet (25 mg total) by mouth once daily.     magnesium oxide 400 mg (241.3 mg magnesium) tablet  Commonly known as: MAG-OX  Take 1 tablet (400 mg total) by mouth 2 (two) times daily.     metoprolol succinate 50 MG 24 hr tablet  Commonly known as: TOPROL-XL  Take 1 tablet (50 mg total) by mouth once daily.     MULTIVITAMIN ORAL  Take 1 tablet by mouth once daily.     nitroGLYCERIN 0.4 MG SL tablet  Commonly known as: NITROSTAT  Place 1 tablet (0.4 mg total) under the tongue every 5 (five) minutes as needed for Chest pain.     ondansetron 4 MG Tbdl  Commonly known as: ZOFRAN-ODT  Take 1 tablet (4 mg total) by mouth 3 (three) times daily with meals.     pantoprazole 40 MG tablet  Commonly known as: PROTONIX  Take 1 tablet (40 mg total) by mouth once daily.     * pen needle, diabetic 30 gauge x 5/16" Ndle  Commonly known as: PEN NEEDLE  1 Units by Misc.(Non-Drug; Combo Route) route 3 (three) times daily.     * BD ULTRA-FINE SHORT PEN NEEDLE 31 gauge x 5/16" Ndle  Generic drug: pen needle, diabetic  3 (three) times daily.     polycarbophil 625 mg tablet  Commonly " known as: FIBERCON  Take 1 tablet by mouth once daily.     senna-docusate 8.6-50 mg 8.6-50 mg per tablet  Commonly known as: PERICOLACE  Take 1 tablet by mouth once daily.     sertraline 25 MG tablet  Commonly known as: ZOLOFT  Take 1 tablet (25 mg total) by mouth once daily.     sucralfate 1 gram tablet  Commonly known as: CARAFATE  Take 1 tablet (1 g total) by mouth 3 (three) times daily before meals.     tamsulosin 0.4 mg Cap  Commonly known as: FLOMAX  Take 1 capsule (0.4 mg total) by mouth once daily.         * This list has 4 medication(s) that are the same as other medications prescribed for you. Read the directions carefully, and ask your doctor or other care provider to review them with you.            STOP taking these medications    albuterol 90 mcg/actuation inhaler  Commonly known as: PROVENTIL/VENTOLIN HFA     aspirin 81 MG EC tablet  Commonly known as: ECOTRIN     DEXCOM G5  Misc  Generic drug: blood-glucose meter,continuous     DEXCOM G5 TRANSMITTER Stephanie  Generic drug: blood-glucose transmitter     diltiaZEM 120 MG Cp24  Commonly known as: CARDIZEM CD     gabapentin 100 MG capsule  Commonly known as: NEURONTIN     insulin glargine 100 unit/mL injection  Commonly known as: Lantus     metFORMIN 1000 MG tablet  Commonly known as: GLUCOPHAGE          Discharge Procedure Orders   Diet diabetic     Activity as tolerated         Pending Studies:  None  Condition: stable  Disposition: Home or Self Care  Time Spent Coordinating Care for Discharge:  Greater than 30 minutes  Last Recorded LACE+ Scores     None

## 2022-05-18 NOTE — PLAN OF CARE
05/18/22 1007   Post-Acute Status   Post-Acute Authorization Placement   Post-Acute Placement Status Pending medical clearance/testing   Spoke with Renée at [facility]. Updates sent in [facility]. Per MD, possible dc today vs tomorrow.    1:25 PM  Per MD, patient is ready for dc today. Spoke with Renée at [facility] who reports can accept patient back today, will just need to review the NH orders. [facility]    Bess Mathews LMSW  [facility]  Ext. [phone]

## 2022-05-18 NOTE — ASSESSMENT & PLAN NOTE
- consult received for evaluation of skin injury to buttocks.  - pt well known to wound care dept from previous admissions.   - recently diagnosed with unstageable pressure injury to left buttocks placing pt at high risk for injury. Now with visible scar tissue present.  - blanchable redness noted to buttocks. Redness subsided on today's exam compared to yesterday by wound care RN.  - continue BPCO (Venelex) bid/prn.   - strict q2h turns.  - encourage pt out of bed to chair and participation in therapy sessions.  - currently on prosper surface with waffle overlay.  - foam dressing in place.  - condom cath intact.   - nursing to maintain pressure injury prevention measures and continue scheduled wound care per orders.

## 2022-05-28 ENCOUNTER — HOSPITAL ENCOUNTER (EMERGENCY)
Facility: HOSPITAL | Age: 77
Discharge: HOME | End: 2022-05-29
Attending: EMERGENCY MEDICINE
Payer: MEDICARE

## 2022-05-28 VITALS
SYSTOLIC BLOOD PRESSURE: 172 MMHG | DIASTOLIC BLOOD PRESSURE: 81 MMHG | RESPIRATION RATE: 18 BRPM | TEMPERATURE: 97 F | HEART RATE: 55 BPM | OXYGEN SATURATION: 95 %

## 2022-05-28 DIAGNOSIS — M54.50 RIGHT LOW BACK PAIN, UNSPECIFIED CHRONICITY, UNSPECIFIED WHETHER SCIATICA PRESENT: ICD-10-CM

## 2022-05-28 DIAGNOSIS — R11.0 NAUSEA: ICD-10-CM

## 2022-05-28 DIAGNOSIS — N20.0 NEPHROLITHIASIS: Primary | ICD-10-CM

## 2022-05-28 DIAGNOSIS — R10.9 FLANK PAIN: ICD-10-CM

## 2022-05-28 DIAGNOSIS — R10.9 ABDOMINAL PAIN, UNSPECIFIED ABDOMINAL LOCATION: ICD-10-CM

## 2022-05-28 LAB
ALBUMIN SERPL BCP-MCNC: 2.6 G/DL (ref 3.5–5.2)
ALLENS TEST: ABNORMAL
ALP SERPL-CCNC: 148 U/L (ref 55–135)
ALT SERPL W/O P-5'-P-CCNC: 34 U/L (ref 10–44)
ANION GAP SERPL CALC-SCNC: 7 MMOL/L (ref 8–16)
AST SERPL-CCNC: 31 U/L (ref 10–40)
B-OH-BUTYR BLD STRIP-SCNC: 0.7 MMOL/L (ref 0–0.5)
BACTERIA #/AREA URNS AUTO: ABNORMAL /HPF
BASOPHILS # BLD AUTO: 0.04 K/UL (ref 0–0.2)
BASOPHILS NFR BLD: 0.4 % (ref 0–1.9)
BILIRUB SERPL-MCNC: 0.4 MG/DL (ref 0.1–1)
BILIRUB UR QL STRIP: NEGATIVE
BUN SERPL-MCNC: 17 MG/DL (ref 8–23)
CALCIUM SERPL-MCNC: 8.7 MG/DL (ref 8.7–10.5)
CHLORIDE SERPL-SCNC: 98 MMOL/L (ref 95–110)
CLARITY UR REFRACT.AUTO: ABNORMAL
CO2 SERPL-SCNC: 28 MMOL/L (ref 23–29)
COLOR UR AUTO: YELLOW
CREAT SERPL-MCNC: 1.2 MG/DL (ref 0.5–1.4)
DIFFERENTIAL METHOD: ABNORMAL
EOSINOPHIL # BLD AUTO: 0.2 K/UL (ref 0–0.5)
EOSINOPHIL NFR BLD: 2.4 % (ref 0–8)
ERYTHROCYTE [DISTWIDTH] IN BLOOD BY AUTOMATED COUNT: 13.5 % (ref 11.5–14.5)
EST. GFR  (AFRICAN AMERICAN): >60 ML/MIN/1.73 M^2
EST. GFR  (NON AFRICAN AMERICAN): 57.6 ML/MIN/1.73 M^2
GLUCOSE SERPL-MCNC: 326 MG/DL (ref 70–110)
GLUCOSE UR QL STRIP: ABNORMAL
HCO3 UR-SCNC: 27.7 MMOL/L (ref 24–28)
HCT VFR BLD AUTO: 38.4 % (ref 40–54)
HGB BLD-MCNC: 12.9 G/DL (ref 14–18)
HGB UR QL STRIP: ABNORMAL
IMM GRANULOCYTES # BLD AUTO: 0.02 K/UL (ref 0–0.04)
IMM GRANULOCYTES NFR BLD AUTO: 0.2 % (ref 0–0.5)
KETONES UR QL STRIP: ABNORMAL
LACTATE SERPL-SCNC: 1 MMOL/L (ref 0.5–2.2)
LEUKOCYTE ESTERASE UR QL STRIP: ABNORMAL
LIPASE SERPL-CCNC: 609 U/L (ref 4–60)
LYMPHOCYTES # BLD AUTO: 1.5 K/UL (ref 1–4.8)
LYMPHOCYTES NFR BLD: 16.2 % (ref 18–48)
MAGNESIUM SERPL-MCNC: 1.6 MG/DL (ref 1.6–2.6)
MCH RBC QN AUTO: 29.9 PG (ref 27–31)
MCHC RBC AUTO-ENTMCNC: 33.6 G/DL (ref 32–36)
MCV RBC AUTO: 89 FL (ref 82–98)
MICROSCOPIC COMMENT: ABNORMAL
MONOCYTES # BLD AUTO: 0.7 K/UL (ref 0.3–1)
MONOCYTES NFR BLD: 7.3 % (ref 4–15)
NEUTROPHILS # BLD AUTO: 6.6 K/UL (ref 1.8–7.7)
NEUTROPHILS NFR BLD: 73.5 % (ref 38–73)
NITRITE UR QL STRIP: NEGATIVE
NRBC BLD-RTO: 0 /100 WBC
PCO2 BLDA: 45.9 MMHG (ref 35–45)
PH SMN: 7.39 [PH] (ref 7.35–7.45)
PH UR STRIP: 5 [PH] (ref 5–8)
PLATELET # BLD AUTO: 234 K/UL (ref 150–450)
PMV BLD AUTO: 9.3 FL (ref 9.2–12.9)
PO2 BLDA: 29 MMHG (ref 40–60)
POC BE: 3 MMOL/L
POC SATURATED O2: 53 % (ref 95–100)
POC TCO2: 29 MMOL/L (ref 24–29)
POTASSIUM SERPL-SCNC: 4.2 MMOL/L (ref 3.5–5.1)
PROT SERPL-MCNC: 5.9 G/DL (ref 6–8.4)
PROT UR QL STRIP: NEGATIVE
RBC # BLD AUTO: 4.32 M/UL (ref 4.6–6.2)
RBC #/AREA URNS AUTO: >100 /HPF (ref 0–4)
SAMPLE: ABNORMAL
SITE: ABNORMAL
SODIUM SERPL-SCNC: 133 MMOL/L (ref 136–145)
SP GR UR STRIP: 1.02 (ref 1–1.03)
URN SPEC COLLECT METH UR: ABNORMAL
WBC # BLD AUTO: 8.93 K/UL (ref 3.9–12.7)
WBC #/AREA URNS AUTO: 47 /HPF (ref 0–5)
YEAST UR QL AUTO: ABNORMAL

## 2022-05-28 PROCEDURE — 80053 COMPREHEN METABOLIC PANEL: CPT

## 2022-05-28 PROCEDURE — 63600175 PHARM REV CODE 636 W HCPCS

## 2022-05-28 PROCEDURE — 83690 ASSAY OF LIPASE: CPT

## 2022-05-28 PROCEDURE — 82010 KETONE BODYS QUAN: CPT

## 2022-05-28 PROCEDURE — 85025 COMPLETE CBC W/AUTO DIFF WBC: CPT

## 2022-05-28 PROCEDURE — 82803 BLOOD GASES ANY COMBINATION: CPT

## 2022-05-28 PROCEDURE — 96375 TX/PRO/DX INJ NEW DRUG ADDON: CPT

## 2022-05-28 PROCEDURE — 82962 GLUCOSE BLOOD TEST: CPT

## 2022-05-28 PROCEDURE — 96366 THER/PROPH/DIAG IV INF ADDON: CPT

## 2022-05-28 PROCEDURE — 81001 URINALYSIS AUTO W/SCOPE: CPT

## 2022-05-28 PROCEDURE — 87086 URINE CULTURE/COLONY COUNT: CPT

## 2022-05-28 PROCEDURE — 25000003 PHARM REV CODE 250

## 2022-05-28 PROCEDURE — 83605 ASSAY OF LACTIC ACID: CPT

## 2022-05-28 PROCEDURE — 96361 HYDRATE IV INFUSION ADD-ON: CPT

## 2022-05-28 PROCEDURE — 93010 ELECTROCARDIOGRAM REPORT: CPT | Mod: ,,, | Performed by: INTERNAL MEDICINE

## 2022-05-28 PROCEDURE — 96365 THER/PROPH/DIAG IV INF INIT: CPT

## 2022-05-28 PROCEDURE — 99900035 HC TECH TIME PER 15 MIN (STAT)

## 2022-05-28 PROCEDURE — 83735 ASSAY OF MAGNESIUM: CPT

## 2022-05-28 PROCEDURE — 99285 EMERGENCY DEPT VISIT HI MDM: CPT | Mod: ,,, | Performed by: EMERGENCY MEDICINE

## 2022-05-28 PROCEDURE — 93010 EKG 12-LEAD: ICD-10-PCS | Mod: ,,, | Performed by: INTERNAL MEDICINE

## 2022-05-28 PROCEDURE — 99285 PR EMERGENCY DEPT VISIT,LEVEL V: ICD-10-PCS | Mod: ,,, | Performed by: EMERGENCY MEDICINE

## 2022-05-28 PROCEDURE — 93005 ELECTROCARDIOGRAM TRACING: CPT

## 2022-05-28 PROCEDURE — 99285 EMERGENCY DEPT VISIT HI MDM: CPT | Mod: 25

## 2022-05-28 RX ORDER — MORPHINE SULFATE 4 MG/ML
4 INJECTION, SOLUTION INTRAMUSCULAR; INTRAVENOUS
Status: COMPLETED | OUTPATIENT
Start: 2022-05-28 | End: 2022-05-28

## 2022-05-28 RX ORDER — ONDANSETRON 2 MG/ML
4 INJECTION INTRAMUSCULAR; INTRAVENOUS
Status: COMPLETED | OUTPATIENT
Start: 2022-05-28 | End: 2022-05-28

## 2022-05-28 RX ORDER — MAGNESIUM SULFATE HEPTAHYDRATE 40 MG/ML
4 INJECTION, SOLUTION INTRAVENOUS
Status: COMPLETED | OUTPATIENT
Start: 2022-05-28 | End: 2022-05-28

## 2022-05-28 RX ORDER — KETOROLAC TROMETHAMINE 30 MG/ML
15 INJECTION, SOLUTION INTRAMUSCULAR; INTRAVENOUS
Status: COMPLETED | OUTPATIENT
Start: 2022-05-28 | End: 2022-05-28

## 2022-05-28 RX ORDER — TAMSULOSIN HYDROCHLORIDE 0.4 MG/1
0.4 CAPSULE ORAL
Status: DISCONTINUED | OUTPATIENT
Start: 2022-05-28 | End: 2022-05-28

## 2022-05-28 RX ADMIN — ONDANSETRON 4 MG: 2 INJECTION INTRAMUSCULAR; INTRAVENOUS at 08:05

## 2022-05-28 RX ADMIN — MAGNESIUM SULFATE 4 G: 2 INJECTION INTRAVENOUS at 09:05

## 2022-05-28 RX ADMIN — KETOROLAC TROMETHAMINE 15 MG: 30 INJECTION, SOLUTION INTRAMUSCULAR at 09:05

## 2022-05-28 RX ADMIN — SODIUM CHLORIDE 1000 ML: 0.9 INJECTION, SOLUTION INTRAVENOUS at 08:05

## 2022-05-28 RX ADMIN — MORPHINE SULFATE 4 MG: 4 INJECTION INTRAVENOUS at 09:05

## 2022-05-29 LAB — POCT GLUCOSE: 343 MG/DL (ref 70–110)

## 2022-05-29 RX ORDER — TAMSULOSIN HYDROCHLORIDE 0.4 MG/1
0.4 CAPSULE ORAL NIGHTLY
Qty: 14 CAPSULE | Refills: 0 | Status: ON HOLD | OUTPATIENT
Start: 2022-05-29 | End: 2022-10-10

## 2022-05-29 RX ORDER — HYDROCODONE BITARTRATE AND ACETAMINOPHEN 5; 325 MG/1; MG/1
1 TABLET ORAL EVERY 8 HOURS PRN
Qty: 9 TABLET | Refills: 0 | Status: SHIPPED | OUTPATIENT
Start: 2022-05-29 | End: 2022-06-01

## 2022-05-29 RX ORDER — HYDROCODONE BITARTRATE AND ACETAMINOPHEN 5; 325 MG/1; MG/1
1 TABLET ORAL EVERY 8 HOURS PRN
Qty: 9 TABLET | Refills: 0 | Status: SHIPPED | OUTPATIENT
Start: 2022-05-29 | End: 2022-05-29 | Stop reason: SDUPTHER

## 2022-05-29 RX ORDER — IBUPROFEN 400 MG/1
400 TABLET ORAL EVERY 6 HOURS PRN
Qty: 20 TABLET | Refills: 0 | Status: SHIPPED | OUTPATIENT
Start: 2022-05-29 | End: 2022-06-12

## 2022-05-29 NOTE — DISCHARGE INSTRUCTIONS
Go to the Emergency Department if you experience worsening symptoms, failure to improve, or have any questions, concerns, or new or concerning symptoms such as chest pain, shortness of breath, fever 100.4 ° F or greater not controlled by Tylenol or Ibuprofen, vomiting, fainting, dizziness, infection, weakness, numbness, facial droop, or slurred speech. Take all of your medications as prescribed.      These are your radiology results:    Imaging Results               CT Renal Stone Study ABD Pelvis WO (Final result)  Result time 05/28/22 22:38:35      Final result by Constantine Barry MD (05/28/22 22:38:35)                   Impression:      Two obstructing stones in the distal right ureter resulting in mild right hydroureteronephrosis.    Mild circumferential bladder wall thickening, could be due to nondistention versus cystitis.  Recommend correlation with urinalysis.    Biliary sludge without evidence of cholecystitis.    Anasarca and small volume abdominopelvic free fluid.    Diverticulosis without diverticulitis.    Findings consistent with interstitial lung disease.    Additional findings discussed in the body of the report.    This report was flagged in Epic as abnormal.    Electronically signed by resident: Deysi Chery  Date:    05/28/2022  Time:    22:05    Electronically signed by: Constantine Barry MD  Date:    05/28/2022  Time:    22:38               Narrative:    EXAMINATION:  CT RENAL STONE STUDY ABD PELVIS WO    CLINICAL HISTORY:  Nephrolithiasis, symptomatic/complicated;    TECHNIQUE:  5.0 mm axial CT images of the abdomen and pelvis were obtained via helical, multi detector CT technique.  No intravenous contrast material was administered.    COMPARISON:  CT abdomen pelvis 04/13/2022.  Complete abdominal ultrasound 04/12/2022.    FINDINGS:  Heart: No cardiomegaly or pericardial effusion.    Lung Bases: Centrilobular emphysema and bilateral peripheral honeycombing consistent with ILD.  Mild  bibasilar atelectasis.  Interval resolution of bilateral pleural effusions.    Liver: Normal in size and attenuation without focal hepatic abnormality.    Gallbladder: Hyperattenuating material lying dependently in the gallbladder, likely biliary sludge.  No evidence for cholecystitis.    Bile Ducts: No intra or extrahepatic biliary ductal dilation.    Pancreas: No pancreatic mass lesion or peripancreatic inflammatory change.    Spleen: Normal.    Adrenals: Unremarkable    Genitourinary: Bilateral simple renal cysts.  Additional subcentimeter hypodensities, too small to characterize.  Punctate right renal stones.  0.4 cm stone in the distal right ureter (axial series 2, image 130) resulting in mild right hydroureteronephrosis.  Additional 0.5 cm stone in the distal right ureter above the ureterovesicular junction (axial image 140).  No left hydroureteronephrosis.  Mild circumferential bladder wall thickening, could be due to nondistention versus cystitis.    Reproductive organs: Stable calcification within the prostate.    GI tract/Mesentery: Stomach is normal appearance.  Moderate stool burden throughout the rectum and sigmoid colon.  Scattered diverticula without evidence of inflammation.  No evidence of bowel obstruction.  Appendix is unremarkable.    Peritoneal Space: Small volume abdominopelvic free fluid.  No pneumoperitoneum.  Diffuse mesenteric stranding, increased from prior exam.    Retroperitoneum: No significant adenopathy.    Abdominal wall: Diffuse body wall anasarca.    Vasculature: Abdominal aorta is normal in caliber.  Atherosclerosis of the descending aorta and its branches..    Bones: Diffuse degenerative change most notable in the inferior lumbar spine and pelvis without acute fracture or bony destructive process.  Severe degenerative changes in the lower lumbar spine with probable severe central canal stenosis at L3-L4.  Significant inter spinous degenerative changes in the lumbar spine which  could be related to [place] disease.                                      If you do not have a primary care doctor you should call this hospital or visit its website in order to obtain followup with a primary care doctor near you.     For the lowest local prices and coupons for your prescriptions, download the application [employer] for your smartphone, or go to [email or web address].

## 2022-05-29 NOTE — ED PROVIDER NOTES
Encounter Date: 5/28/2022       History     Chief Complaint   Patient presents with    Back Pain     Pt brought to ED via RODNEY from [facility] for right flank pain. Pt with HX of kidney stones.      77 y/o M PMHx HTN, HLD, T2DM, Kidney stones, Afib (on Eliquis), CAD presenting from his nursing home with worsening right flank pain and generalized abdominal pain for the last day. He was recently hospitalized for DKA and says this abdominal pain is similar to his pain then. He can't remember if his right flank pain feels the same as the last time he had kidney stones. He says the pain is worsened with movement and is colicky in nature. It is currently a 7/10 but was a 10/10 just prior to presentation. He endorses good fluid intake and appetite. He says the pain has been associated with some intermittent nausea but no vomiting. He has chronic constipation. Denies any fevers, chills, CP, SOB, hematuria, dysuria, dizziness, diarrhea, headaches, numbness and tingling in extremities.        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 8/2/2012    STEMI involving right coronary artery 1/9/2022    Type II or unspecified type diabetes mellitus with neurological manifestations, uncontrolled(250.62) 3/8/2013    Urinary tract infection      Past Surgical History:   Procedure Laterality Date    BACK SURGERY      CATARACT EXTRACTION W/  INTRAOCULAR LENS IMPLANT Right     Per Dr Romero note 11/2018    COLONOSCOPY N/A 1/28/2019    Procedure: COLONOSCOPY Suprep;  Surgeon: Anh Johnson MD;   Location: [facility] ENDO;  Service: Endoscopy;  Laterality: N/A;    EYE SURGERY      HERNIA REPAIR      LEFT HEART CATHETERIZATION Left 2022    Procedure: CATHETERIZATION, HEART, LEFT;  Surgeon: Will Hurst III, MD;  Location: [facility] CATH LAB/EP;  Service: Cardiology;  Laterality: Left;    renal stones      SHOULDER OPEN ROTATOR CUFF REPAIR       Family History   Problem Relation Age of Onset    Diabetes Father     Prostate cancer Neg Hx     Kidney disease Neg Hx      Social History     Tobacco Use    Smoking status: Former Smoker     Packs/day: 1.50     Years: 25.00     Pack years: 37.50     Quit date: 1983     Years since quittin.4    Smokeless tobacco: Never Used   Substance Use Topics    Alcohol use: No    Drug use: No     Review of Systems   Constitutional: Negative for chills, fatigue and fever.   HENT: Negative for congestion, rhinorrhea and sore throat.    Respiratory: Negative for chest tightness and shortness of breath.    Cardiovascular: Negative for chest pain.   Gastrointestinal: Positive for abdominal pain, constipation (chronic) and nausea. Negative for blood in stool, diarrhea and vomiting.   Genitourinary: Positive for flank pain. Negative for difficulty urinating, dysuria, frequency, hematuria and urgency.   Musculoskeletal: Positive for back pain. Negative for arthralgias and neck pain.   Skin: Negative for rash.   Neurological: Negative for dizziness, weakness, numbness and headaches.       Physical Exam     Initial Vitals [22]   BP Pulse Resp Temp SpO2   138/74 (!) 59 18 97.4 °F (36.3 °C) 95 %      MAP       --         Physical Exam    Constitutional: He appears well-developed. No distress.   Frail-appearing   HENT:   Head: Normocephalic and atraumatic.   Dry mucous membranes   Eyes: Conjunctivae and EOM are normal.   Neck: Neck supple. No JVD present.   Normal range of motion.  Cardiovascular: Normal rate, regular rhythm and normal heart sounds. Exam reveals  no gallop and no friction rub.    No murmur heard.  Pulmonary/Chest: Breath sounds normal. No respiratory distress. He has no wheezes. He has no rhonchi. He has no rales.   Abdominal: Abdomen is soft. Bowel sounds are normal. He exhibits no distension. There is no abdominal tenderness. There is no rebound and no guarding.   Genitourinary:    Genitourinary Comments: R CVA tenderness     Musculoskeletal:         General: Tenderness (Exquisite TTP in right flank) present. No edema. Normal range of motion.      Cervical back: Normal range of motion and neck supple.     Neurological: He is alert and oriented to person, place, and time.   Skin: Skin is warm and dry. No rash noted. No erythema.   Psychiatric: He has a normal mood and affect. Thought content normal.         ED Course   Procedures  Labs Reviewed   CBC W/ AUTO DIFFERENTIAL - Abnormal; Notable for the following components:       Result Value    RBC 4.32 (*)     Hemoglobin 12.9 (*)     Hematocrit 38.4 (*)     Gran % 73.5 (*)     Lymph % 16.2 (*)     All other components within normal limits   COMPREHENSIVE METABOLIC PANEL - Abnormal; Notable for the following components:    Sodium 133 (*)     Glucose 326 (*)     Total Protein 5.9 (*)     Albumin 2.6 (*)     Alkaline Phosphatase 148 (*)     Anion Gap 7 (*)     eGFR if non  57.6 (*)     All other components within normal limits   LIPASE - Abnormal; Notable for the following components:    Lipase 609 (*)     All other components within normal limits   URINALYSIS, REFLEX TO URINE CULTURE - Abnormal; Notable for the following components:    Appearance, UA Hazy (*)     Glucose, UA 3+ (*)     Ketones, UA 1+ (*)     Occult Blood UA 3+ (*)     Leukocytes, UA 2+ (*)     All other components within normal limits    Narrative:     Specimen Source->Urine   BETA - HYDROXYBUTYRATE, SERUM - Abnormal; Notable for the following components:    Beta-Hydroxybutyrate 0.7 (*)     All other components within normal  limits   URINALYSIS MICROSCOPIC - Abnormal; Notable for the following components:    RBC, UA >100 (*)     WBC, UA 47 (*)     Yeast, UA Few (*)     All other components within normal limits    Narrative:     Specimen Source->Urine   ISTAT PROCEDURE - Abnormal; Notable for the following components:    POC PCO2 45.9 (*)     POC PO2 29 (*)     POC SATURATED O2 53 (*)     All other components within normal limits   CULTURE, URINE   MAGNESIUM   LACTIC ACID, PLASMA   POCT GLUCOSE MONITORING CONTINUOUS     EKG Readings: (Independently Interpreted)   Initial Reading: No STEMI. Previous EKG: Compared with most recent EKG Rhythm: Sinus Bradycardia. Ectopy: No Ectopy. Conduction: Normal. ST Segments: Normal ST Segments. T Waves: Normal. Clinical Impression: Normal Sinus Rhythm       Imaging Results           CT Renal Stone Study ABD Pelvis WO (Final result)  Result time 05/28/22 22:38:35    Final result by Constantine Barry MD (05/28/22 22:38:35)                 Impression:      Two obstructing stones in the distal right ureter resulting in mild right hydroureteronephrosis.    Mild circumferential bladder wall thickening, could be due to nondistention versus cystitis.  Recommend correlation with urinalysis.    Biliary sludge without evidence of cholecystitis.    Anasarca and small volume abdominopelvic free fluid.    Diverticulosis without diverticulitis.    Findings consistent with interstitial lung disease.    Additional findings discussed in the body of the report.    This report was flagged in Epic as abnormal.    Electronically signed by resident: Deysi Chery  Date:    05/28/2022  Time:    22:05    Electronically signed by: Constantine Barry MD  Date:    05/28/2022  Time:    22:38             Narrative:    EXAMINATION:  CT RENAL STONE STUDY ABD PELVIS WO    CLINICAL HISTORY:  Nephrolithiasis, symptomatic/complicated;    TECHNIQUE:  5.0 mm axial CT images of the abdomen and pelvis were obtained via helical, multi  detector CT technique.  No intravenous contrast material was administered.    COMPARISON:  CT abdomen pelvis 04/13/2022.  Complete abdominal ultrasound 04/12/2022.    FINDINGS:  Heart: No cardiomegaly or pericardial effusion.    Lung Bases: Centrilobular emphysema and bilateral peripheral honeycombing consistent with ILD.  Mild bibasilar atelectasis.  Interval resolution of bilateral pleural effusions.    Liver: Normal in size and attenuation without focal hepatic abnormality.    Gallbladder: Hyperattenuating material lying dependently in the gallbladder, likely biliary sludge.  No evidence for cholecystitis.    Bile Ducts: No intra or extrahepatic biliary ductal dilation.    Pancreas: No pancreatic mass lesion or peripancreatic inflammatory change.    Spleen: Normal.    Adrenals: Unremarkable    Genitourinary: Bilateral simple renal cysts.  Additional subcentimeter hypodensities, too small to characterize.  Punctate right renal stones.  0.4 cm stone in the distal right ureter (axial series 2, image 130) resulting in mild right hydroureteronephrosis.  Additional 0.5 cm stone in the distal right ureter above the ureterovesicular junction (axial image 140).  No left hydroureteronephrosis.  Mild circumferential bladder wall thickening, could be due to nondistention versus cystitis.    Reproductive organs: Stable calcification within the prostate.    GI tract/Mesentery: Stomach is normal appearance.  Moderate stool burden throughout the rectum and sigmoid colon.  Scattered diverticula without evidence of inflammation.  No evidence of bowel obstruction.  Appendix is unremarkable.    Peritoneal Space: Small volume abdominopelvic free fluid.  No pneumoperitoneum.  Diffuse mesenteric stranding, increased from prior exam.    Retroperitoneum: No significant adenopathy.    Abdominal wall: Diffuse body wall anasarca.    Vasculature: Abdominal aorta is normal in caliber.  Atherosclerosis of the descending aorta and its  branches..    Bones: Diffuse degenerative change most notable in the inferior lumbar spine and pelvis without acute fracture or bony destructive process.  Severe degenerative changes in the lower lumbar spine with probable severe central canal stenosis at L3-L4.  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                                 Medications   sodium chloride 0.9% bolus 1,000 mL (0 mLs Intravenous Stopped 5/28/22 2146)   ondansetron injection 4 mg (4 mg Intravenous Given 5/28/22 2045)   ketorolac injection 15 mg (15 mg Intravenous Given 5/28/22 2143)   morphine injection 4 mg (4 mg Intravenous Given 5/28/22 2143)   magnesium sulfate 2g in water 50mL IVPB (premix) (0 g Intravenous Stopped 5/28/22 2343)     Medical Decision Making:   Initial Assessment:   75 y/o M presenting from nursing home with intermittent right flank pain and generalized abdominal pain.  Differential Diagnosis:   Nephrolithiasis, cystitis, pyelonephritis, DKA, ischemic colitis, chronic mesenteric ischemia, pancreatitis, hepatitis, PUD  Clinical Tests:   Lab Tests: Ordered and Reviewed  Radiological Study: Ordered and Reviewed  Medical Tests: Ordered and Reviewed  ED Management:  Patient with colicky right flank pain, some CVA tenderness on exam, concern for renal stone vs pyelonephritis. Patient afebrile, no leukocytosis, less likely infectious. Denies hematuria however has a history of renal stones and colicky nature of pain most suggestive of renal stones. Abdominal pain is generalized and patient has chronic atherosclerotic disease, ischemic bowel possible but less likely. Patient recently admitted with DKA and says his abdominal pain is similar to his DKA pain. Lipase elevated, concern for pancreatitis. POCT Glucose >300, r/o DKA with CMP and BHB. BHB mildly elevated. VBG unremarkable. Unlikely DKA. Patient dry on exam, given 1L IVFs and IV anti-emetics for nausea.  Bedside US showing moderate hydronephrosis on the right side. CT renal stone study showing two obstructing stones in the distal right ureter resulting in mild right hydroureteronephrosis. UA also suggesting UTI (infected stone?) Discussed with urology. See ED course for updates.             ED Course as of 05/29/22 0142   Sun May 29, 2022   0041 CT Renal Stone Study ABD Pelvis WO(!)  Two obstructing stones in the distal right ureter resulting in mild right hydroureteronephrosis. Punctate right renal stones.  0.4 cm stone in the distal right ureter (axial series 2, image 130) resulting in mild right hydroureteronephrosis.  Additional 0.5 cm stone in the distal right ureter above the ureterovesicular junction (axial image 140) [AW]   0140 Called urology who recommends trial of expulsion therapy and outpatient follow up. The Patient's repeat physical exam benign. The patient has been given strict return precautions. The patient agrees to follow up outpatient with urology. Patient discharged with Hugo-max, ibuprofen and norco.  Patient discharged in stable condition.   [AW]      ED Course User Index  [AW] Cresencio Montiel MD             Clinical Impression:   Final diagnoses:  [R10.9] Flank pain  [M54.50] Right low back pain, unspecified chronicity, unspecified whether sciatica present  [R10.9] Abdominal pain, unspecified abdominal location  [R11.0] Nausea  [N20.0] Nephrolithiasis (Primary)          ED Disposition Condition    Discharge Stable        ED Prescriptions     Medication Sig Dispense Start Date End Date Auth. Provider    tamsulosin (FLOMAX) 0.4 mg Cap Take 1 capsule (0.4 mg total) by mouth every evening. for 14 days 14 capsule 5/29/2022 6/12/2022 Cresencio Montiel MD    ibuprofen (ADVIL,MOTRIN) 400 MG tablet Take 1 tablet (400 mg total) by mouth every 6 (six) hours as needed (Pain). 20 tablet 5/29/2022 6/12/2022 Cresencio Montiel MD    HYDROcodone-acetaminophen (NORCO) 5-325 mg per tablet  (Status: Discontinued) Take 1  tablet by mouth every 8 (eight) hours as needed for Pain (For break through pain). 9 tablet 5/29/2022 5/29/2022 Cresencio Montiel MD    HYDROcodone-acetaminophen (NORCO) 5-325 mg per tablet Take 1 tablet by mouth every 8 (eight) hours as needed for Pain (For break through pain). 9 tablet 5/29/2022 6/1/2022 Cresencio Montiel MD        Follow-up Information     Follow up With Specialties Details Why Contact Info Additional Information    [facility] - Urology [facility] Schedule an appointment as soon as possible for a visit in 1 week follow-up [id number]  [facility] [id number] [phone] [address] Building, [address]th Floor Please park in [facility] and take [facility] elevator    [facility] - Emergency Dept Emergency Medicine Go to  As needed, If symptoms worsen [id number]  [facility] [phone]            Cresencio Montiel MD  Resident  05/29/22 0142

## 2022-05-30 LAB
BACTERIA UR CULT: NORMAL
BACTERIA UR CULT: NORMAL

## 2022-06-06 LAB
ACID FAST MOD KINY STN SPEC: ABNORMAL
MYCOBACTERIUM SPEC QL CULT: ABNORMAL
MYCOBACTERIUM SPEC QL CULT: ABNORMAL

## 2022-06-10 ENCOUNTER — OUTPATIENT CASE MANAGEMENT (OUTPATIENT)
Dept: ADMINISTRATIVE | Facility: OTHER | Age: 77
End: 2022-06-10

## 2022-06-14 ENCOUNTER — PATIENT MESSAGE (OUTPATIENT)
Dept: ADMINISTRATIVE | Facility: OTHER | Age: 77
End: 2022-06-14

## 2022-06-30 ENCOUNTER — HOSPITAL ENCOUNTER (EMERGENCY)
Facility: HOSPITAL | Age: 77
Discharge: HOME | End: 2022-06-30
Attending: EMERGENCY MEDICINE
Payer: MEDICARE

## 2022-06-30 VITALS
WEIGHT: 157.44 LBS | OXYGEN SATURATION: 97 % | RESPIRATION RATE: 18 BRPM | TEMPERATURE: 98 F | SYSTOLIC BLOOD PRESSURE: 138 MMHG | HEART RATE: 70 BPM | BODY MASS INDEX: 20.21 KG/M2 | DIASTOLIC BLOOD PRESSURE: 74 MMHG
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
## 2022-06-30 DIAGNOSIS — R53.1 WEAKNESS: ICD-10-CM

## 2022-06-30 LAB
ALBUMIN SERPL BCP-MCNC: 2.8 G/DL (ref 3.5–5.2)
ALP SERPL-CCNC: 94 U/L (ref 55–135)
ALT SERPL W/O P-5'-P-CCNC: 22 U/L (ref 10–44)
ANION GAP SERPL CALC-SCNC: 9 MMOL/L (ref 8–16)
AST SERPL-CCNC: 25 U/L (ref 10–40)
BASOPHILS # BLD AUTO: 0.04 K/UL (ref 0–0.2)
BASOPHILS NFR BLD: 0.5 % (ref 0–1.9)
BILIRUB SERPL-MCNC: 0.5 MG/DL (ref 0.1–1)
BUN SERPL-MCNC: 19 MG/DL (ref 8–23)
CALCIUM SERPL-MCNC: 8.3 MG/DL (ref 8.7–10.5)
CHLORIDE SERPL-SCNC: 99 MMOL/L (ref 95–110)
CO2 SERPL-SCNC: 26 MMOL/L (ref 23–29)
CREAT SERPL-MCNC: 1.1 MG/DL (ref 0.5–1.4)
DIFFERENTIAL METHOD: ABNORMAL
EOSINOPHIL # BLD AUTO: 0.2 K/UL (ref 0–0.5)
EOSINOPHIL NFR BLD: 2.7 % (ref 0–8)
ERYTHROCYTE [DISTWIDTH] IN BLOOD BY AUTOMATED COUNT: 13.9 % (ref 11.5–14.5)
EST. GFR  (AFRICAN AMERICAN): >60 ML/MIN/1.73 M^2
EST. GFR  (NON AFRICAN AMERICAN): >60 ML/MIN/1.73 M^2
GLUCOSE SERPL-MCNC: 287 MG/DL (ref 70–110)
HCT VFR BLD AUTO: 38.2 % (ref 40–54)
HGB BLD-MCNC: 12.3 G/DL (ref 14–18)
IMM GRANULOCYTES # BLD AUTO: 0.02 K/UL (ref 0–0.04)
IMM GRANULOCYTES NFR BLD AUTO: 0.2 % (ref 0–0.5)
LYMPHOCYTES # BLD AUTO: 1 K/UL (ref 1–4.8)
LYMPHOCYTES NFR BLD: 12.6 % (ref 18–48)
MCH RBC QN AUTO: 29.8 PG (ref 27–31)
MCHC RBC AUTO-ENTMCNC: 32.2 G/DL (ref 32–36)
MCV RBC AUTO: 93 FL (ref 82–98)
MONOCYTES # BLD AUTO: 0.6 K/UL (ref 0.3–1)
MONOCYTES NFR BLD: 7 % (ref 4–15)
NEUTROPHILS # BLD AUTO: 6.2 K/UL (ref 1.8–7.7)
NEUTROPHILS NFR BLD: 77 % (ref 38–73)
NRBC BLD-RTO: 0 /100 WBC
PLATELET # BLD AUTO: 208 K/UL (ref 150–450)
PMV BLD AUTO: 9.7 FL (ref 9.2–12.9)
POTASSIUM SERPL-SCNC: 4.2 MMOL/L (ref 3.5–5.1)
PROT SERPL-MCNC: 6.1 G/DL (ref 6–8.4)
RBC # BLD AUTO: 4.13 M/UL (ref 4.6–6.2)
SODIUM SERPL-SCNC: 134 MMOL/L (ref 136–145)
TROPONIN I SERPL DL<=0.01 NG/ML-MCNC: 0.01 NG/ML (ref 0–0.03)
WBC # BLD AUTO: 8.09 K/UL (ref 3.9–12.7)

## 2022-06-30 PROCEDURE — 93010 EKG 12-LEAD: ICD-10-PCS | Mod: ,,, | Performed by: INTERNAL MEDICINE

## 2022-06-30 PROCEDURE — 93005 ELECTROCARDIOGRAM TRACING: CPT

## 2022-06-30 PROCEDURE — 99283 EMERGENCY DEPT VISIT LOW MDM: CPT | Mod: ,,, | Performed by: EMERGENCY MEDICINE

## 2022-06-30 PROCEDURE — 85025 COMPLETE CBC W/AUTO DIFF WBC: CPT | Performed by: EMERGENCY MEDICINE

## 2022-06-30 PROCEDURE — 80053 COMPREHEN METABOLIC PANEL: CPT | Performed by: EMERGENCY MEDICINE

## 2022-06-30 PROCEDURE — 99283 PR EMERGENCY DEPT VISIT,LEVEL III: ICD-10-PCS | Mod: ,,, | Performed by: EMERGENCY MEDICINE

## 2022-06-30 PROCEDURE — 99285 EMERGENCY DEPT VISIT HI MDM: CPT | Mod: 25

## 2022-06-30 PROCEDURE — 84484 ASSAY OF TROPONIN QUANT: CPT | Performed by: EMERGENCY MEDICINE

## 2022-06-30 PROCEDURE — 93010 ELECTROCARDIOGRAM REPORT: CPT | Mod: ,,, | Performed by: INTERNAL MEDICINE

## 2022-06-30 NOTE — ED TRIAGE NOTES
Kamar Muñoz, a 76 y.o. male presents to the ED w/ complaint of back pain and bruising noted to L ear. Pt states he tripped and fell and hit the side of his head on his side table. Pt is AAOx4, denies LOC.     Triage note:  Chief Complaint   Patient presents with    Fall     Back pain, bruising noted to left ear. Hit table and bed from standing position. No loc. Alert and oriented. From [facility].      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

## 2022-06-30 NOTE — MEDICAL/APP STUDENT
History     Chief Complaint   Patient presents with    Fall     Back pain, bruising noted to left ear. Hit table and bed from standing position. No loc. Alert and oriented. From [facility].      76 yoM with Hx of CVA, MI, DM presents s/p fall at nursing home during which he hit his head with no LOC. He was standing and both of his legs gave out. This occurred abruptly with no warning. He denies any dizziness, HA, CP, SOB, cough, fever, hematuria. He has residual left sided lower extremity weakness after CVA earlier this year.      The history is provided by the patient.       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    Hyperlipidemia     Hypertension     Kidney stone     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 8/2/2012    STEMI involving right coronary artery 1/9/2022    Type II or unspecified type diabetes mellitus with neurological manifestations, uncontrolled(250.62) 3/8/2013    Urinary tract infection        Past Surgical History:   Procedure Laterality Date    BACK SURGERY      CATARACT EXTRACTION W/  INTRAOCULAR LENS IMPLANT Right     Per Dr Romero note 11/2018    COLONOSCOPY N/A 1/28/2019    Procedure: COLONOSCOPY Suprep;  Surgeon: Anh Johnson MD;  Location: [facility] ENDO;  Service: Endoscopy;  Laterality: N/A;    EYE SURGERY      HERNIA REPAIR      LEFT HEART CATHETERIZATION Left 1/9/2022    Procedure: CATHETERIZATION, HEART, LEFT;  Surgeon: Will Hurst III, MD;  Location: [facility] CATH LAB/EP;  Service: Cardiology;  Laterality: Left;    renal stones      SHOULDER OPEN ROTATOR CUFF REPAIR         Family History   Problem Relation Age of Onset    Diabetes Father     Prostate cancer Neg Hx     Kidney disease Neg Hx        Social History     Tobacco Use    Smoking status: Former Smoker     Packs/day: 1.50      Years: 25.00     Pack years: 37.50     Quit date: 1983     Years since quittin.5    Smokeless tobacco: Never Used   Substance Use Topics    Alcohol use: No    Drug use: No       Review of Systems   Constitutional: Negative for diaphoresis and fever.   HENT: Negative for congestion and sore throat.    Eyes: Negative.    Respiratory: Negative for cough and shortness of breath.    Cardiovascular: Negative for chest pain.   Gastrointestinal: Negative for abdominal pain, blood in stool, diarrhea, nausea and vomiting.   Endocrine: Negative.    Genitourinary: Negative for dysuria and hematuria.   Skin: Positive for color change (extensive bruising ).   Neurological: Positive for weakness. Negative for dizziness, syncope and headaches.   Hematological: Bruises/bleeds easily.   Psychiatric/Behavioral: Negative.        Physical Exam   /74   Pulse 70   Temp 97.6 °F (36.4 °C) (Oral)   Resp 18   Wt 71.4 kg (157 lb 6.5 oz)   SpO2 97%   BMI 20.21 kg/m²     Physical Exam    Constitutional: He appears well-developed.   HENT:   Head: Normocephalic.   Abrasion and bruising to left ear   Eyes: EOM are normal.   Neck:   Normal range of motion.  Cardiovascular: Normal rate and regular rhythm.   Pulmonary/Chest: Breath sounds normal.   Abdominal: Abdomen is soft. Bowel sounds are normal. There is no abdominal tenderness.   Musculoskeletal:         General: Normal range of motion.      Cervical back: Normal range of motion.     Neurological: He is alert and oriented to person, place, and time. No sensory deficit.   Decreased strength LLE and left hand    Skin: Skin is warm and dry. Capillary refill takes less than 2 seconds.   Extensive bruising         ED Course

## 2022-06-30 NOTE — ED NOTES
LOC: The patient is awake, alert, and oriented to self, place, time, and situation. Pt is calm and cooperative. Affect is appropriate.  Speech is appropriate and clear.     APPEARANCE: Patient resting comfortably in no acute distress.  Patient is clean and well groomed.    SKIN: The skin is warm and dry; color consistent with ethnicity.  Patient has normal skin turgor and moist mucus membranes.  Skin intact; no breakdown or bruising noted.     MUSCULOSKELETAL: Patient moving upper and lower extremities without difficulty; denies pain in the extremities, reports back pain.  Reports weakness.     RESPIRATORY: Airway is open and patent. Respirations spontaneous, even, easy, and non-labored.  Patient has a normal effort and rate.  No accessory muscle use noted. Denies cough.     CARDIAC:  Normal rate noted.  No peripheral edema noted. No complaints of chest pain.      ABDOMEN: Soft and non tender to palpation.  No distention noted. Pt denies abdominal pain; denies nausea, vomiting, diarrhea, or constipation.    NEUROLOGIC: Eyes open spontaneously.  Behavior appropriate to situation.  Follows commands; facial expression symmetrical.  Purposeful motor response noted; normal sensation in all extremities. Pt denies headache; denies lightheadedness or dizziness; denies visual disturbances; denies loss of balance; denies unilateral weakness.

## 2022-07-01 NOTE — ED NOTES
Pt insurance inactive, [facility] called to verify if [facility] will cover cost of transport. Lea Swain, house supervisor, approved.

## 2022-07-01 NOTE — ED PROVIDER NOTES
Encounter Date: 6/30/2022       History     Chief Complaint   Patient presents with    Fall     Back pain, bruising noted to left ear. Hit table and bed from standing position. No loc. Alert and oriented. From [facility].      76-year-old past medical history of CAD, type 2 diabetes, hypertension, kidney stones, peripheral neuropathy, presenting with mechanical fall.  As per patient and baseline uses a walker and wheelchair and while trying to get up from seated position and reach for will treat patient slipped and fell.  Patient denies any LOC.  Denies any new chest pain, shortness of breath, palpitations, nausea, vomiting, diarrhea, new onset numbness, tingling or weakness no changes in vision mentions having abrasion of the left ear.        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 8/2/2012    STEMI involving right coronary artery 1/9/2022    Type II or unspecified type diabetes mellitus with neurological manifestations, uncontrolled(250.62) 3/8/2013    Urinary tract infection      Past Surgical History:   Procedure Laterality Date    BACK SURGERY      CATARACT EXTRACTION W/  INTRAOCULAR LENS IMPLANT Right     Per Dr Romero note 11/2018    COLONOSCOPY N/A 1/28/2019    Procedure: COLONOSCOPY Suprep;  Surgeon: Anh Johnson MD;  Location: [facility] ENDO;  Service: Endoscopy;  Laterality: N/A;    EYE SURGERY      HERNIA REPAIR      LEFT HEART CATHETERIZATION Left 1/9/2022    Procedure: CATHETERIZATION, HEART, LEFT;  Surgeon: Will Hurst III, MD;  Location: [facility] CATH  LAB/EP;  Service: Cardiology;  Laterality: Left;    renal stones      SHOULDER OPEN ROTATOR CUFF REPAIR       Family History   Problem Relation Age of Onset    Diabetes Father     Prostate cancer Neg Hx     Kidney disease Neg Hx      Social History     Tobacco Use    Smoking status: Former Smoker     Packs/day: 1.50     Years: 25.00     Pack years: 37.50     Quit date: 1983     Years since quittin.5    Smokeless tobacco: Never Used   Substance Use Topics    Alcohol use: No    Drug use: No     Review of Systems   Constitutional: Negative for fever.   HENT: Negative for sore throat.    Respiratory: Negative for shortness of breath.    Cardiovascular: Negative for chest pain.   Gastrointestinal: Negative for nausea.   Genitourinary: Negative for dysuria.   Musculoskeletal: Negative for back pain.   Skin: Negative for rash.   Neurological: Negative for weakness.   Hematological: Does not bruise/bleed easily.       Physical Exam     Initial Vitals [22 1206]   BP Pulse Resp Temp SpO2   138/74 70 18 97.6 °F (36.4 °C) 97 %      MAP       --         Physical Exam    Nursing note and vitals reviewed.  Constitutional: He appears well-developed and well-nourished. He is not diaphoretic. No distress.   HENT:   Head: Normocephalic.   Nose: Nose normal.   Abrasion to left ear.   Eyes: Conjunctivae and EOM are normal. Pupils are equal, round, and reactive to light. No scleral icterus.   Neck: Neck supple.   Normal range of motion.  Cardiovascular: Normal rate and regular rhythm. Exam reveals no gallop and no friction rub.    No murmur heard.  Pulmonary/Chest: Breath sounds normal. No respiratory distress. He has no wheezes. He has no rhonchi. He has no rales.   Abdominal: Abdomen is soft. Bowel sounds are normal. There is no abdominal tenderness. There is no rebound and no guarding.   Musculoskeletal:         General: No tenderness or edema. Normal range of motion.      Cervical back: Normal range of motion  and neck supple.     Neurological: He is alert and oriented to person, place, and time. He has normal strength. No cranial nerve deficit or sensory deficit. GCS score is 15. GCS eye subscore is 4. GCS verbal subscore is 5. GCS motor subscore is 6.   Skin: Skin is warm and dry. No rash noted. No erythema. No pallor.   Psychiatric: He has a normal mood and affect. His behavior is normal. Judgment and thought content normal.         ED Course   Procedures  Labs Reviewed   CBC W/ AUTO DIFFERENTIAL - Abnormal; Notable for the following components:       Result Value    RBC 4.13 (*)     Hemoglobin 12.3 (*)     Hematocrit 38.2 (*)     Gran % 77.0 (*)     Lymph % 12.6 (*)     All other components within normal limits   COMPREHENSIVE METABOLIC PANEL - Abnormal; Notable for the following components:    Sodium 134 (*)     Glucose 287 (*)     Calcium 8.3 (*)     Albumin 2.8 (*)     All other components within normal limits   TROPONIN I        ECG Results          EKG 12-lead (Final result)  Result time 07/01/22 14:17:40    Final result by Interface, [facility] (07/01/22 14:17:40)                 Narrative:    Test Reason : R53.1,    Vent. Rate : 068 BPM     Atrial Rate : 068 BPM     P-R Int : 170 ms          QRS Dur : 104 ms      QT Int : 432 ms       P-R-T Axes : 064 -03 -38 degrees     QTc Int : 459 ms    Sinus rhythm with occasional Premature ventricular complexes  Nonspecific T wave abnormality  Abnormal ECG  When compared with ECG of 28-MAY-2022 20:36,  Premature ventricular complexes are now Present  Confirmed by EMANUEL MONTES MD ([id number]) on 7/1/2022 2:17:31 PM    Referred By: GAGAN   SELF           Confirmed By:EMANUEL MONTES MD                            Imaging Results           CT Thoracic Spine Without Contrast (Final result)  Result time 06/30/22 18:08:41    Final result by Sourav Mathis MD (06/30/22 18:08:41)                 Impression:      1. No acute abnormality of the thoracic spine.  2. Moderate right  hydronephrosis, similar to the prior study.  Follow-up recommended.  3. Diffuse emphysematous changes of the lungs.  1.2 x 1.7 cm right upper lobe pulmonary nodule.  Peripheral airspace disease and/or pleural scarring near the apex posteriorly on the right is similar to the prior study.  There is possible associated cavitation or bleb formation.  This could be associated with infection or scarring.  Fungal disease or small mass is not excluded and follow-up is recommended.  PET-CT scan may be helpful.  4.  This report was flagged in Epic as abnormal.      Electronically signed by: Sourav Bradshaw  Date:    06/30/2022  Time:    18:08             Narrative:    EXAMINATION:  CT THORACIC SPINE WITHOUT CONTRAST    CLINICAL HISTORY:  Spine fracture, thoracic, traumatic;    TECHNIQUE:  Low-dose axial images through the thoracic spine without contrast.  Sagittal and coronal reconstructions.    COMPARISON:  05/28/2022, 04/18/2022    FINDINGS:  Vertebral body heights appear well maintained.  No fractures are detected.  Mild degenerative disc space narrowing.  Mild anterior osteophytic spurring in the midthoracic spine.    No significant disc bulge or protrusion.    Central canal and neural foramen appear adequately maintained.    Trace effusion and atelectasis posterior on the left are incidentally noted.    Moderate right hydronephrosis, similar to the prior study.  Follow-up recommended.    Multiple left renal cysts.    No osseous destruction.    Diffuse emphysematous changes of the lungs.  1.2 x 1.7 cm right upper lobe pulmonary nodule.  Follow-up recommended.    Peripheral airspace disease and or pleural scarring near the apex posteriorly on the right is similar to the prior study.  This could be associated with scarring.  There is associated cavitation or bleb formation.  Fungal disease or small mass is not excluded.  This is similar to the prior study.    PET-CT scan may be helpful.                               CT Head  Without Contrast (Final result)  Result time 06/30/22 18:13:16    Final result by Terry Ralph DO (06/30/22 18:13:16)                 Impression:      1. No acute intracranial abnormality.  2. Remote infarctions in the right frontal lobe and left cerebellar hemisphere.      Electronically signed by: Terry Ralph  Date:    06/30/2022  Time:    18:13             Narrative:    EXAMINATION:  CT HEAD WITHOUT CONTRAST    CLINICAL HISTORY:  Head trauma, minor (Age >= 65y); Unspecified fall, initial encounter    TECHNIQUE:  Low dose axial CT images obtained throughout the head without intravenous contrast. Sagittal and coronal reconstructions were performed.    COMPARISON:  CT head from 05/14/2022.    FINDINGS:  Ventricles and sulci are normal in size for age without evidence of hydrocephalus. No extra-axial blood or fluid collections.  There is encephalomalacia in the left cerebellar hemisphere and the right frontal lobe compatible with remote infarctions.  No parenchymal mass, hemorrhage, edema or major vascular distribution infarct.    No calvarial fracture.  The scalp is unremarkable.  Bilateral paranasal sinuses and mastoid air cells are clear.  There are bilateral prosthetic lenses.                                 Medications - No data to display              ED Course as of 07/01/22 2043   Thu Jun 30, 2022   1841 Laboratory testing unremarkable CT scan with no acute traumatic findings from fall.  Will DC home with follow-up instructions return precautions.  Safe for plan discharge home patient made aware of CT findings. [DC]      ED Course User Index  [DC] Charmaine Davis Jr., MD             Clinical Impression:   Final diagnoses:  [W19.XXXA] Fall (Primary)  [R53.1] Weakness          ED Disposition Condition    Discharge Stable        ED Prescriptions     None        Follow-up Information     Follow up With Specialties Details Why Contact Info    Basim Guerrero MD Family Medicine In 1 week  [address]  [facility] [id number]  [phone]             Charmaine Davis Jr., MD  07/01/22 2044

## 2022-07-12 ENCOUNTER — TELEPHONE (OUTPATIENT)
Dept: GASTROENTEROLOGY | Facility: CLINIC | Age: 77
End: 2022-07-12
Payer: MEDICARE

## 2022-07-12 NOTE — TELEPHONE ENCOUNTER
----- Message from Kenroy Cline sent at 7/12/2022 10:33 AM CDT -----  Type: Patient Call Back    Who called:Kelly/ [facility]    What is the request in detail: Pt requesting a sooner appt    Can the clinic reply by [facility]? no    Would the patient rather a call back or a response via [facility]? Call back    Best call back number:     [phone] Kelly

## 2022-07-12 NOTE — TELEPHONE ENCOUNTER
Spoke to mrs. Zaldivar from [facility], letting her know we don't have anything available  Sooner. She stated they will stay with 08/31/2022 and to put him in the waiting list verbal understanding.

## 2022-08-31 ENCOUNTER — OFFICE VISIT (OUTPATIENT)
Dept: GASTROENTEROLOGY | Facility: CLINIC | Age: 77
End: 2022-08-31
Payer: MEDICARE

## 2022-08-31 DIAGNOSIS — K21.9 GASTROESOPHAGEAL REFLUX DISEASE, UNSPECIFIED WHETHER ESOPHAGITIS PRESENT: Primary | ICD-10-CM

## 2022-08-31 DIAGNOSIS — R93.89 ABNORMAL FINDING ON CT SCAN: ICD-10-CM

## 2022-08-31 DIAGNOSIS — R10.84 GENERALIZED ABDOMINAL PAIN: ICD-10-CM

## 2022-08-31 PROCEDURE — 99215 OFFICE O/P EST HI 40 MIN: CPT | Mod: S$PBB,,, | Performed by: NURSE PRACTITIONER

## 2022-08-31 PROCEDURE — 99215 PR OFFICE/OUTPT VISIT, EST, LEVL V, 40-54 MIN: ICD-10-PCS | Mod: S$PBB,,, | Performed by: NURSE PRACTITIONER

## 2022-08-31 PROCEDURE — 99214 OFFICE O/P EST MOD 30 MIN: CPT | Mod: PBBFAC,PO | Performed by: NURSE PRACTITIONER

## 2022-08-31 PROCEDURE — 99999 PR PBB SHADOW E&M-EST. PATIENT-LVL IV: CPT | Mod: PBBFAC,,, | Performed by: NURSE PRACTITIONER

## 2022-08-31 PROCEDURE — 99999 PR PBB SHADOW E&M-EST. PATIENT-LVL IV: ICD-10-PCS | Mod: PBBFAC,,, | Performed by: NURSE PRACTITIONER

## 2022-08-31 NOTE — PATIENT INSTRUCTIONS
EGD Instructions    [facility] [address]  Clinic Office [phone]  Endoscopy Lab [phone]    You are scheduled for an EGD with Dr. Evans  on  09/14/2022 at [facility] in [place].    Check in at the Hospital -1st floor, Information desk.   Call [phone] to reschedule.    You cannot have anything to eat or drink after Midnight. You can brush your teeth with a sip of water.     An adult friend/family member must come with you to drive you home.  You cannot drive, take a taxi, Uber/Lyft or bus to leave the Endoscopy Center alone.  If you do not have someone to drive you home, your test will be cancelled.     Please follow the directions of your doctor if you take any pills that thin your blood. If you take these meds: Aggrenox, Brilinta, Effient, Eliquis, Lovenox, Plavix, Pletal, Pradaxa, Ticilid, Xarelto or Coumadin, let the doctor's office know.    DON'T: On the morning of the test do not take insulin or pills for diabetes.     DO: On the morning of the test, do take any pills for blood pressure, heart, anti-rejection and or seizures with a small sip of water. Bring any inhalers with you.    A Covid test is required 72 hours before the procedure. The test is not needed with proof of vaccination > 2 weeks or previous Covid infection.    Leave all valuables and jewelry at home. You will be at the hospital for 2-4 hours.    Call the Endoscopy department at [phone] with any questions about your procedure.    Thank you for choosing [facility].

## 2022-08-31 NOTE — Clinical Note
Can you please get a current medlist from [facility]? Patient is not sure if he is takingProtonix or which blood thinner he is taking. I need to know about the Protonix so I can start it if not taking and I need to start Metamucil if he is not taking that. Thanks.

## 2022-08-31 NOTE — PROGRESS NOTES
GASTROENTEROLOGY CLINIC NOTE    Chief Complaint: The primary encounter diagnosis was Gastroesophageal reflux disease, unspecified whether esophagitis present. Diagnoses of Abnormal finding on CT scan and Generalized abdominal pain were also pertinent to this visit.  Referring provider/PCP: Basim Guerrero MD    HPI:  Kamar Muñoz is a 77 y.o. male who is a new patient to me with a PMH that is significant for stroke, AFib, diabetes mellitus type 2 and is accompanied by his daughter. He is here today to establish care for abdominal pain and reflux.  These are new problems that began about 8 months ago following his stroke at the beginning of this year.  He has previously sought care in the emergency room due to these symptoms where abdominal ultrasound, CT scan, and gastric emptying study were performed.  Ultrasound and CT noted distended gallbladder with sludge.  CT also noted large amount of stool in the large intestine and gastric emptying study was normal.  He reports the abdominal pain occurs a few times a week and is worse at night and after eating.  Pain is primarily located in the upper abdomen and lower abdomen and described as a cramping type pain with sudden onset.  The pain does not radiate but can last for a few hours.  The pain is aggravated with eating primarily the upper abdominal pain.  Lower abdominal pain is mildly improved following a bowel movement.  The pain is not aggravated or alleviated with movement or position changes.  Denies dysphagia but reports nocturnal symptoms of reflux.  Nausea and vomiting have improved but was previously occurring with abdominal pain.   Bowel movements alternate in consistency between hard and watery but he does have at least 1 bowel movement per day.  When loose bowel movements occurs he does have urgency.  He will experience episodes of having the urge to have a bowel movement and will pass only gas without stool.  Denies melena or hematochezia.   No nocturnal symptoms reported.  He reports to me he began taking Metamucil about 2 weeks ago.    Attempted to update med list.  Patient is unsure of his medications as he is a resident at [facility] nursing [facility].  Will request current last from [facility]    Treatments Tried:  Unsure due to med list not being current  NSAIDs: No  Anticoagulation or Antiplatelet:  Plavix and Eliquis are listed on med list.  Patient reports he is only taking Eliquis at this time on    Prior Upper Endoscopy:5/2007 no abnormal findings  Prior Colonoscopy: 1/2019 with Dr. Johnson  Impression:           - Diverticulosis in the left colon.                         - Internal hemorrhoids.                         - No specimens collected.     Recommendation:       - Discharge patient to home.                         - Patient has a contact number available for                         emergencies. The signs and symptoms of potential                         delayed complications were discussed with the                         patient. Return to normal activities tomorrow.                         Written discharge instructions were provided to the                         patient.                         - Resume previous diet.                         - Continue present medications.                         - Repeat colonoscopy in 5 years for surveillance.                         - Return to GI clinic PRN.     Family h/o Colon Cancer: No  Family h/o Crohn's Disease or Ulcerative Colitis: No  Family h/o Celiac Sprue: No  Abdominal Surgeries:  No        Review of Systems   Constitutional:  Negative for weight loss.   HENT:  Negative for sore throat.    Eyes:  Negative for blurred vision.   Respiratory:  Negative for cough.    Cardiovascular:  Negative for chest pain.   Gastrointestinal:  Positive for abdominal pain, constipation and diarrhea. Negative for blood in stool, heartburn, melena, nausea and vomiting.   Genitourinary:  Negative  "for dysuria.   Musculoskeletal:  Negative for myalgias.   Skin:  Negative for rash.   Neurological:  Negative for headaches.   Endo/Heme/Allergies:  Negative for environmental allergies.   Psychiatric/Behavioral:  Negative for suicidal ideas. The patient is not nervous/anxious.      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    Past Surgical History: has a past surgical history that includes Back surgery; Eye surgery; Shoulder open rotator cuff repair; renal stones; Hernia repair; Colonoscopy (N/A, 1/28/2019); Cataract extraction w/  intraocular lens implant (Right); and Left heart catheterization (Left, 1/9/2022).    Family History:family history includes Diabetes in his father.    Allergies:   Review of patient's allergies indicates:   Allergen Reactions    Iodine      Other reaction(s): swelling  Other reaction(s): Itching  Other reaction(s): Rash       Social History: reports that he quit smoking about 39 years ago. He has a 37.50 pack-year smoking history. He has never used smokeless tobacco. He reports that he does not drink alcohol and does not use drugs.    Home medications:   Current Outpatient Medications on File Prior to Visit   Medication Sig Dispense Refill    amiodarone (PACERONE) 200 MG Tab Take 1 tablet (200 mg total) by mouth once daily. 90 tablet 3    apixaban (ELIQUIS) 5 mg Tab Take 1 tablet (5 mg total) by mouth 2 (two) times daily. 60 tablet 5    atorvastatin (LIPITOR) 40 MG tablet Take 1 tablet (40 mg total) by mouth once daily. 90 tablet 3    BD ULTRA-FINE SHORT PEN NEEDLE 31 gauge x 5/16" " Ndle 3 (three) times daily.      blood sugar diagnostic Strp 1 strip by Misc.(Non-Drug; Combo Route) route 2 (two) times a day. 200 strip 6    cetirizine (ZYRTEC) 10 MG tablet Take 1 tablet (10 mg total) by mouth every evening.  0    clopidogreL (PLAVIX) 75 mg tablet Take 1 tablet (75 mg total) by mouth once daily. 30 tablet 11    diclofenac sodium (VOLTAREN) 1 % Gel Apply 4 g topically 3 (three) times daily. Apply to sacrun closed skin/buttocks      ergocalciferol (ERGOCALCIFEROL) 50,000 unit Cap Take 1 capsule (50,000 Units total) by mouth every 7 days. 12 capsule 3    glucose 4 GM chewable tablet Take 4 tablets (16 g total) by mouth as needed for Low blood sugar (50 - 70).  12    glucose 4 GM chewable tablet Take 6 tablets (24 g total) by mouth as needed for Low blood sugar (< 50).  12    insulin aspart U-100 (NOVOLOG) 100 unit/mL (3 mL) InPn pen Inject 4 Units into the skin with snacks (>200).  0    insulin aspart U-100 (NOVOLOG) 100 unit/mL (3 mL) InPn pen Inject 8-16 Units into the skin 3 (three) times daily. 43.2 mL 3    insulin detemir U-100 (LEVEMIR FLEXTOUCH) 100 unit/mL (3 mL) SubQ InPn pen Inject 6 Units into the skin 2 (two) times daily. 10.8 mL 3    lacosamide (VIMPAT) 100 mg Tab Take 1 tablet (100 mg total) by mouth every 12 (twelve) hours. 60 tablet 11    lancets (ONETOUCH ULTRASOFT LANCETS) Misc 1 lancet by Misc.(Non-Drug; Combo Route) route 2 (two) times a day. 200 each 6    losartan (COZAAR) 25 MG tablet Take 1 tablet (25 mg total) by mouth once daily. 90 tablet 3    magnesium oxide (MAG-OX) 400 mg (241.3 mg magnesium) tablet Take 1 tablet (400 mg total) by mouth 2 (two) times daily.  0    metoprolol succinate (TOPROL-XL) 50 MG 24 hr tablet Take 1 tablet (50 mg total) by mouth once daily. 30 tablet 3    MULTIVITAMIN ORAL Take 1 tablet by mouth once daily.       nitroGLYCERIN (NITROSTAT) 0.4 MG SL tablet Place 1 tablet (0.4 mg total) under the tongue every 5 (five) minutes as needed for Chest pain.  "25 tablet 11    pantoprazole (PROTONIX) 40 MG tablet Take 1 tablet (40 mg total) by mouth once daily. 30 tablet 11    pen needle, diabetic (PEN NEEDLE) 30 gauge x 5/16" Ndle 1 Units by Misc.(Non-Drug; Combo Route) route 3 (three) times daily. 100 each 3    polycarbophil (FIBERCON) 625 mg tablet Take 1 tablet by mouth once daily.       senna-docusate 8.6-50 mg (PERICOLACE) 8.6-50 mg per tablet Take 1 tablet by mouth once daily.      sertraline (ZOLOFT) 25 MG tablet Take 1 tablet (25 mg total) by mouth once daily. 30 tablet 11    sucralfate (CARAFATE) 1 gram tablet Take 1 tablet (1 g total) by mouth 3 (three) times daily before meals. 90 tablet 3    tamsulosin (FLOMAX) 0.4 mg Cap Take 1 capsule (0.4 mg total) by mouth every evening. for 14 days 14 capsule 0    [DISCONTINUED] albuterol (PROVENTIL/VENTOLIN HFA) 90 mcg/actuation inhaler Inhale 2 puffs into the lungs every 4 (four) hours as needed for Wheezing (Pt states he will take it on his own. MDI placed by bedside.). Rescue      [DISCONTINUED] aspirin (ECOTRIN) 81 MG EC tablet Take 81 mg by mouth once daily.      [DISCONTINUED] blood-glucose meter,continuous (DEXCOM G5 ) Misc 1 Device by Misc.(Non-Drug; Combo Route) route once daily at 6am. 1 each 0    [DISCONTINUED] blood-glucose transmitter (DEXCOM G5 TRANSMITTER) Stephanie 1 Device by Misc.(Non-Drug; Combo Route) route once daily at 6am. 1 each 0    [DISCONTINUED] diltiaZEM (CARDIZEM CD) 120 MG Cp24 Take 1 capsule (120 mg total) by mouth once daily. 90 capsule 3    [DISCONTINUED] gabapentin (NEURONTIN) 100 MG capsule Take 2 capsules (200 mg total) by mouth 3 (three) times daily. 180 capsule 3    [DISCONTINUED] insulin glargine (LANTUS) 100 unit/mL injection Inject 6 Units into the skin 2 (two) times a day. 15 mL 3    [DISCONTINUED] metFORMIN (GLUCOPHAGE) 1000 MG tablet Take 1 tablet (1,000 mg total) by mouth 2 (two) times daily with meals. 60 tablet 3     No current facility-administered medications on file " prior to visit.       Vital signs:  There were no vitals taken for this visit.    Physical Exam  Constitutional:       General: He is not in acute distress.     Appearance: Normal appearance. He is not ill-appearing.      Comments: Wheelchair   HENT:      Head: Normocephalic.   Cardiovascular:      Rate and Rhythm: Normal rate and regular rhythm.      Heart sounds: Normal heart sounds. No murmur heard.  Pulmonary:      Effort: Pulmonary effort is normal. No respiratory distress.      Breath sounds: Normal breath sounds.   Chest:      Chest wall: No tenderness.   Abdominal:      General: Bowel sounds are normal. There is no distension.      Palpations: Abdomen is soft.      Tenderness: There is no abdominal tenderness. Negative signs include Aviles's sign.      Hernia: No hernia is present.   Skin:     General: Skin is warm.   Neurological:      Mental Status: He is alert and oriented to person, place, and time.   Psychiatric:         Mood and Affect: Mood normal.         Behavior: Behavior normal.       Routine labs:  Lab Results   Component Value Date    WBC 8.09 06/30/2022    HGB 12.3 (L) 06/30/2022    HCT 38.2 (L) 06/30/2022    MCV 93 06/30/2022     06/30/2022     Lab Results   Component Value Date    INR 1.0 05/13/2022     Lab Results   Component Value Date    FERRITIN 334 (H) 03/07/2022     Lab Results   Component Value Date     (L) 06/30/2022    K 4.2 06/30/2022    CL 99 06/30/2022    CO2 26 06/30/2022    BUN 19 06/30/2022    CREATININE 1.1 06/30/2022     Lab Results   Component Value Date    ALBUMIN 2.8 (L) 06/30/2022    ALT 22 06/30/2022    AST 25 06/30/2022    ALKPHOS 94 06/30/2022    BILITOT 0.5 06/30/2022     No results found for: GLUCOSE  Lab Results   Component Value Date    TSH 0.600 01/25/2022     Lab Results   Component Value Date    CALCIUM 8.3 (L) 06/30/2022    PHOS 4.1 05/16/2022       Imaging:  EXAMINATION:  US ABDOMEN COMPLETE     CLINICAL HISTORY:  Vomiting;      TECHNIQUE:  Complete abdominal ultrasound (including pancreas, aorta, liver, gallbladder, common bile duct, IVC, kidneys, and spleen) was performed.     COMPARISON:  None     FINDINGS:  Pancreas: The visualized portions of pancreas appear normal.  Mild prominence of the pancreatic duct, which remains within normal limits.     Aorta: Normal in caliber without aneurysmal dilatation.     Liver: Mildly enlarged, measuring 18.2 cm.  Homogeneous parenchymal echotexture. No focal lesions.     Gallbladder: Gallbladder sludge.  No wall thickening, wall hyperemia, or pericholecystic fluid.  Negative sonographic Aviles's sign.     Biliary system: No intrahepatic or extrahepatic biliary ductal dilatation.  Common bile duct measures 3 mm.     Inferior vena cava: Normal in appearance.     Right kidney: 12.3 cm, normal in size.  No solid renal mass.  1.2 cm simple cyst.  No hydronephrosis.     Left kidney: 12.6 cm, normal in size.  No solid renal mass.  1.4 cm simple cyst.  No hydronephrosis.     Spleen: 10.7 x 1.9 cm.  Normal in size with homogeneous echotexture.     Miscellaneous: No ascites.     Impression:     Gallbladder sludge without evidence of acute cholecystitis.     Mild hepatomegaly.     Electronically signed by resident: Barbara Higginbotham  Date:                                            04/12/2022  Time:                                           20:33     Electronically signed by: Azeem Cao MD  Date:                                            04/12/2022  Time:                                           20:52    EXAMINATION:  CT ABDOMEN PELVIS WITH CONTRAST     CLINICAL HISTORY:  Abdominal pain, acute, nonlocalized;     TECHNIQUE:  Low dose axial images, sagittal and coronal reformations were obtained from the lung bases to the pubic symphysis following the IV administration of 75 mL of Omnipaque 350 .  Oral contrast was not given.     COMPARISON:  05/07/2020     FINDINGS:  Images of the lower thorax remarkable  for prominent interlobular septal thickening.  There is bilateral basilar dependent atelectasis/fibrotic change.  There are small bilateral pleural effusions.  There is bilateral gynecomastia.     The liver, spleen and adrenal glands are grossly unremarkable.  There is atrophic change of the pancreas without pancreatic ductal dilation.  The gallbladder is distended noting layering calculi or sludge.  The stomach is decompressed.  There is no biliary dilation.  There is strand-like ascites within the abdomen.     The kidneys enhance symmetrically without hydronephrosis.  There is right nonobstructive nephrolithiasis.  Several low attenuating lesions arise from the kidneys bilaterally, the majority of which are too small for characterization.  The largest lesion within the interpolar region of the left kidney measures 3.5 cm with attenuation suggesting cyst.  The bilateral ureters are unremarkable without calculi seen.  The urinary bladder is distended without wall thickening.  Air within the urinary bladder suggests sequela of catheterization although correlation with the same recommended.  The prostate is not enlarged.     There is a large amount of stool within the rectum noting there is presacral edema and mild perirectal indistinctness.  Several scattered colonic diverticula are noted without surrounding inflammation.  The terminal ileum and appendix are unremarkable.  The small bowel is grossly unremarkable.  There are several scattered shotty periaortic and paracaval lymph nodes.  There is atherosclerotic calcification of the aorta and its branches.  No focal organized pelvic fluid collection.     There is posttraumatic or postsurgical change of the right posterior iliac wing.  Degenerative changes are noted of the spine noting surgical changes.  There is osteopenia.  No significant inguinal lymphadenopathy.  There is mild body wall anasarca.     Impression:     1. The gallbladder is distended noting layering  cholelithiasis or sludge, no secondary findings to suggest acute cholecystitis.  2. Strand-like fluid within the abdomen and pelvis noting mild body wall anasarca and mild pleural effusions, correlation with volume overload advised.  3. Colonic diverticulosis without diverticulitis.  4. Large amount of stool within the rectum, early changes of stercoral proctitis is a consideration.  5. The urinary bladder is distended, air within the bladder may reflect sequela of catheterization however correlation with urinalysis is recommended.  6. Right nonobstructive nephrolithiasis.  7. Please see above for several additional findings.        Electronically signed by: Johnny Gilmore MD  Date:                                            04/13/2022  Time:                                           18:07    EXAMINATION:  NM GASTRIC EMPTYING     CLINICAL HISTORY:  chronic nausea in diabetic;     TECHNIQUE:  The patient received 1.0 mCi orally of sulfur colloid labeled meal in less than 10 minutes.     Anterior and posterior projection images were obtained immediately and at 60 minute intervals for 4 hours.     Geometric mean of the anterior and the posterior images was generated. The decay-corrected time activity curve and the percentage of the retention and emptying were obtained.     COMPARISON:  CT abdomen pelvis 04/13/2022.     FINDINGS:  At 4 hour(s) the percentage of retention is 2 % (normal retention at 4 hours is 10% and lower).     Impression:     Normal gastric emptying time.     I, Bam Booth MD, attest that I reviewed and interpreted the images.     Electronically signed by resident: Cisco Alvarado MD  Date:                                            04/29/2022  Time:                                           14:09     Electronically signed by: Bam Booth  Date:                                            04/29/2022  Time:                                           14:18        I have reviewed prior labs, imaging, and  notes.      Assessment:  1. Gastroesophageal reflux disease, unspecified whether esophagitis present    2. Abnormal finding on CT scan    3. Generalized abdominal pain        Plan:  Orders Placed This Encounter    Ambulatory referral/consult to General Surgery    Case Request Endoscopy: EGD (ESOPHAGOGASTRODUODENOSCOPY)     EGD to further evaluate recent onset of reflux especially at night  Protonix listed on med list but patient is unsure if he is currently taking.  Will request current medication list from [facility].  If patient is not taking Protonix consider starting Protonix 40 mg once a day.  Surgery referral for sludge noted in gallbladder  Start Metamucil if not currently taking  Clearance to hold blood thinners for procedure.  Eliquis and Plavix listed on med list but patient reports he is only taking Eliquis    Patient with history of diabetes mellitus type 2.  Last A1c was 9.7.  Recommend better control of diabetes as this could be contributing to the nausea and vomiting he was previously experiencing.  Gastric emptying study recently performed and was normal      Plan of care discussed with patient who is in agreement and verbalized understanding.     I have explained the planned procedures to the patient.The risks, benefits and alternatives of the procedure were also explained in detail. Patient verbalized understanding, all questions were answered. The patient agrees to proceed as planned    Follow Up:  Pending workup       Higher than average risk given patient is on anticoagulant and will need clearance to hold Plavix or Eliquis.      HIRAL Meneses,FNP-BC  [facility]

## 2022-08-31 NOTE — H&P (VIEW-ONLY)
GASTROENTEROLOGY CLINIC NOTE    Chief Complaint: The primary encounter diagnosis was Gastroesophageal reflux disease, unspecified whether esophagitis present. Diagnoses of Abnormal finding on CT scan and Generalized abdominal pain were also pertinent to this visit.  Referring provider/PCP: Basim Guerrero MD    HPI:  Kamar Muñoz is a 77 y.o. male who is a new patient to me with a PMH that is significant for stroke, AFib, diabetes mellitus type 2 and is accompanied by his daughter. He is here today to establish care for abdominal pain and reflux.  These are new problems that began about 8 months ago following his stroke at the beginning of this year.  He has previously sought care in the emergency room due to these symptoms where abdominal ultrasound, CT scan, and gastric emptying study were performed.  Ultrasound and CT noted distended gallbladder with sludge.  CT also noted large amount of stool in the large intestine and gastric emptying study was normal.  He reports the abdominal pain occurs a few times a week and is worse at night and after eating.  Pain is primarily located in the upper abdomen and lower abdomen and described as a cramping type pain with sudden onset.  The pain does not radiate but can last for a few hours.  The pain is aggravated with eating primarily the upper abdominal pain.  Lower abdominal pain is mildly improved following a bowel movement.  The pain is not aggravated or alleviated with movement or position changes.  Denies dysphagia but reports nocturnal symptoms of reflux.  Nausea and vomiting have improved but was previously occurring with abdominal pain.   Bowel movements alternate in consistency between hard and watery but he does have at least 1 bowel movement per day.  When loose bowel movements occurs he does have urgency.  He will experience episodes of having the urge to have a bowel movement and will pass only gas without stool.  Denies melena or hematochezia.   No nocturnal symptoms reported.  He reports to me he began taking Metamucil about 2 weeks ago.    Attempted to update med list.  Patient is unsure of his medications as he is a resident at [facility] nursing [facility].  Will request current last from [facility]    Treatments Tried:  Unsure due to med list not being current  NSAIDs: No  Anticoagulation or Antiplatelet:  Plavix and Eliquis are listed on med list.  Patient reports he is only taking Eliquis at this time on    Prior Upper Endoscopy:5/2007 no abnormal findings  Prior Colonoscopy: 1/2019 with Dr. Johnson  Impression:           - Diverticulosis in the left colon.                         - Internal hemorrhoids.                         - No specimens collected.     Recommendation:       - Discharge patient to home.                         - Patient has a contact number available for                         emergencies. The signs and symptoms of potential                         delayed complications were discussed with the                         patient. Return to normal activities tomorrow.                         Written discharge instructions were provided to the                         patient.                         - Resume previous diet.                         - Continue present medications.                         - Repeat colonoscopy in 5 years for surveillance.                         - Return to GI clinic PRN.     Family h/o Colon Cancer: No  Family h/o Crohn's Disease or Ulcerative Colitis: No  Family h/o Celiac Sprue: No  Abdominal Surgeries:  No        Review of Systems   Constitutional:  Negative for weight loss.   HENT:  Negative for sore throat.    Eyes:  Negative for blurred vision.   Respiratory:  Negative for cough.    Cardiovascular:  Negative for chest pain.   Gastrointestinal:  Positive for abdominal pain, constipation and diarrhea. Negative for blood in stool, heartburn, melena, nausea and vomiting.   Genitourinary:  Negative  "for dysuria.   Musculoskeletal:  Negative for myalgias.   Skin:  Negative for rash.   Neurological:  Negative for headaches.   Endo/Heme/Allergies:  Negative for environmental allergies.   Psychiatric/Behavioral:  Negative for suicidal ideas. The patient is not nervous/anxious.      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    Past Surgical History: has a past surgical history that includes Back surgery; Eye surgery; Shoulder open rotator cuff repair; renal stones; Hernia repair; Colonoscopy (N/A, 1/28/2019); Cataract extraction w/  intraocular lens implant (Right); and Left heart catheterization (Left, 1/9/2022).    Family History:family history includes Diabetes in his father.    Allergies:   Review of patient's allergies indicates:   Allergen Reactions    Iodine      Other reaction(s): swelling  Other reaction(s): Itching  Other reaction(s): Rash       Social History: reports that he quit smoking about 39 years ago. He has a 37.50 pack-year smoking history. He has never used smokeless tobacco. He reports that he does not drink alcohol and does not use drugs.    Home medications:   Current Outpatient Medications on File Prior to Visit   Medication Sig Dispense Refill    amiodarone (PACERONE) 200 MG Tab Take 1 tablet (200 mg total) by mouth once daily. 90 tablet 3    apixaban (ELIQUIS) 5 mg Tab Take 1 tablet (5 mg total) by mouth 2 (two) times daily. 60 tablet 5    atorvastatin (LIPITOR) 40 MG tablet Take 1 tablet (40 mg total) by mouth once daily. 90 tablet 3    BD ULTRA-FINE SHORT PEN NEEDLE 31 gauge x 5/16" " Ndle 3 (three) times daily.      blood sugar diagnostic Strp 1 strip by Misc.(Non-Drug; Combo Route) route 2 (two) times a day. 200 strip 6    cetirizine (ZYRTEC) 10 MG tablet Take 1 tablet (10 mg total) by mouth every evening.  0    clopidogreL (PLAVIX) 75 mg tablet Take 1 tablet (75 mg total) by mouth once daily. 30 tablet 11    diclofenac sodium (VOLTAREN) 1 % Gel Apply 4 g topically 3 (three) times daily. Apply to sacrun closed skin/buttocks      ergocalciferol (ERGOCALCIFEROL) 50,000 unit Cap Take 1 capsule (50,000 Units total) by mouth every 7 days. 12 capsule 3    glucose 4 GM chewable tablet Take 4 tablets (16 g total) by mouth as needed for Low blood sugar (50 - 70).  12    glucose 4 GM chewable tablet Take 6 tablets (24 g total) by mouth as needed for Low blood sugar (< 50).  12    insulin aspart U-100 (NOVOLOG) 100 unit/mL (3 mL) InPn pen Inject 4 Units into the skin with snacks (>200).  0    insulin aspart U-100 (NOVOLOG) 100 unit/mL (3 mL) InPn pen Inject 8-16 Units into the skin 3 (three) times daily. 43.2 mL 3    insulin detemir U-100 (LEVEMIR FLEXTOUCH) 100 unit/mL (3 mL) SubQ InPn pen Inject 6 Units into the skin 2 (two) times daily. 10.8 mL 3    lacosamide (VIMPAT) 100 mg Tab Take 1 tablet (100 mg total) by mouth every 12 (twelve) hours. 60 tablet 11    lancets (ONETOUCH ULTRASOFT LANCETS) Misc 1 lancet by Misc.(Non-Drug; Combo Route) route 2 (two) times a day. 200 each 6    losartan (COZAAR) 25 MG tablet Take 1 tablet (25 mg total) by mouth once daily. 90 tablet 3    magnesium oxide (MAG-OX) 400 mg (241.3 mg magnesium) tablet Take 1 tablet (400 mg total) by mouth 2 (two) times daily.  0    metoprolol succinate (TOPROL-XL) 50 MG 24 hr tablet Take 1 tablet (50 mg total) by mouth once daily. 30 tablet 3    MULTIVITAMIN ORAL Take 1 tablet by mouth once daily.       nitroGLYCERIN (NITROSTAT) 0.4 MG SL tablet Place 1 tablet (0.4 mg total) under the tongue every 5 (five) minutes as needed for Chest pain.  "25 tablet 11    pantoprazole (PROTONIX) 40 MG tablet Take 1 tablet (40 mg total) by mouth once daily. 30 tablet 11    pen needle, diabetic (PEN NEEDLE) 30 gauge x 5/16" Ndle 1 Units by Misc.(Non-Drug; Combo Route) route 3 (three) times daily. 100 each 3    polycarbophil (FIBERCON) 625 mg tablet Take 1 tablet by mouth once daily.       senna-docusate 8.6-50 mg (PERICOLACE) 8.6-50 mg per tablet Take 1 tablet by mouth once daily.      sertraline (ZOLOFT) 25 MG tablet Take 1 tablet (25 mg total) by mouth once daily. 30 tablet 11    sucralfate (CARAFATE) 1 gram tablet Take 1 tablet (1 g total) by mouth 3 (three) times daily before meals. 90 tablet 3    tamsulosin (FLOMAX) 0.4 mg Cap Take 1 capsule (0.4 mg total) by mouth every evening. for 14 days 14 capsule 0    [DISCONTINUED] albuterol (PROVENTIL/VENTOLIN HFA) 90 mcg/actuation inhaler Inhale 2 puffs into the lungs every 4 (four) hours as needed for Wheezing (Pt states he will take it on his own. MDI placed by bedside.). Rescue      [DISCONTINUED] aspirin (ECOTRIN) 81 MG EC tablet Take 81 mg by mouth once daily.      [DISCONTINUED] blood-glucose meter,continuous (DEXCOM G5 ) Misc 1 Device by Misc.(Non-Drug; Combo Route) route once daily at 6am. 1 each 0    [DISCONTINUED] blood-glucose transmitter (DEXCOM G5 TRANSMITTER) Stephanie 1 Device by Misc.(Non-Drug; Combo Route) route once daily at 6am. 1 each 0    [DISCONTINUED] diltiaZEM (CARDIZEM CD) 120 MG Cp24 Take 1 capsule (120 mg total) by mouth once daily. 90 capsule 3    [DISCONTINUED] gabapentin (NEURONTIN) 100 MG capsule Take 2 capsules (200 mg total) by mouth 3 (three) times daily. 180 capsule 3    [DISCONTINUED] insulin glargine (LANTUS) 100 unit/mL injection Inject 6 Units into the skin 2 (two) times a day. 15 mL 3    [DISCONTINUED] metFORMIN (GLUCOPHAGE) 1000 MG tablet Take 1 tablet (1,000 mg total) by mouth 2 (two) times daily with meals. 60 tablet 3     No current facility-administered medications on file " prior to visit.       Vital signs:  There were no vitals taken for this visit.    Physical Exam  Constitutional:       General: He is not in acute distress.     Appearance: Normal appearance. He is not ill-appearing.      Comments: Wheelchair   HENT:      Head: Normocephalic.   Cardiovascular:      Rate and Rhythm: Normal rate and regular rhythm.      Heart sounds: Normal heart sounds. No murmur heard.  Pulmonary:      Effort: Pulmonary effort is normal. No respiratory distress.      Breath sounds: Normal breath sounds.   Chest:      Chest wall: No tenderness.   Abdominal:      General: Bowel sounds are normal. There is no distension.      Palpations: Abdomen is soft.      Tenderness: There is no abdominal tenderness. Negative signs include Aviles's sign.      Hernia: No hernia is present.   Skin:     General: Skin is warm.   Neurological:      Mental Status: He is alert and oriented to person, place, and time.   Psychiatric:         Mood and Affect: Mood normal.         Behavior: Behavior normal.       Routine labs:  Lab Results   Component Value Date    WBC 8.09 06/30/2022    HGB 12.3 (L) 06/30/2022    HCT 38.2 (L) 06/30/2022    MCV 93 06/30/2022     06/30/2022     Lab Results   Component Value Date    INR 1.0 05/13/2022     Lab Results   Component Value Date    FERRITIN 334 (H) 03/07/2022     Lab Results   Component Value Date     (L) 06/30/2022    K 4.2 06/30/2022    CL 99 06/30/2022    CO2 26 06/30/2022    BUN 19 06/30/2022    CREATININE 1.1 06/30/2022     Lab Results   Component Value Date    ALBUMIN 2.8 (L) 06/30/2022    ALT 22 06/30/2022    AST 25 06/30/2022    ALKPHOS 94 06/30/2022    BILITOT 0.5 06/30/2022     No results found for: GLUCOSE  Lab Results   Component Value Date    TSH 0.600 01/25/2022     Lab Results   Component Value Date    CALCIUM 8.3 (L) 06/30/2022    PHOS 4.1 05/16/2022       Imaging:  EXAMINATION:  US ABDOMEN COMPLETE     CLINICAL HISTORY:  Vomiting;      TECHNIQUE:  Complete abdominal ultrasound (including pancreas, aorta, liver, gallbladder, common bile duct, IVC, kidneys, and spleen) was performed.     COMPARISON:  None     FINDINGS:  Pancreas: The visualized portions of pancreas appear normal.  Mild prominence of the pancreatic duct, which remains within normal limits.     Aorta: Normal in caliber without aneurysmal dilatation.     Liver: Mildly enlarged, measuring 18.2 cm.  Homogeneous parenchymal echotexture. No focal lesions.     Gallbladder: Gallbladder sludge.  No wall thickening, wall hyperemia, or pericholecystic fluid.  Negative sonographic Aviles's sign.     Biliary system: No intrahepatic or extrahepatic biliary ductal dilatation.  Common bile duct measures 3 mm.     Inferior vena cava: Normal in appearance.     Right kidney: 12.3 cm, normal in size.  No solid renal mass.  1.2 cm simple cyst.  No hydronephrosis.     Left kidney: 12.6 cm, normal in size.  No solid renal mass.  1.4 cm simple cyst.  No hydronephrosis.     Spleen: 10.7 x 1.9 cm.  Normal in size with homogeneous echotexture.     Miscellaneous: No ascites.     Impression:     Gallbladder sludge without evidence of acute cholecystitis.     Mild hepatomegaly.     Electronically signed by resident: Barbara Higginbotham  Date:                                            04/12/2022  Time:                                           20:33     Electronically signed by: Azeem Cao MD  Date:                                            04/12/2022  Time:                                           20:52    EXAMINATION:  CT ABDOMEN PELVIS WITH CONTRAST     CLINICAL HISTORY:  Abdominal pain, acute, nonlocalized;     TECHNIQUE:  Low dose axial images, sagittal and coronal reformations were obtained from the lung bases to the pubic symphysis following the IV administration of 75 mL of Omnipaque 350 .  Oral contrast was not given.     COMPARISON:  05/07/2020     FINDINGS:  Images of the lower thorax remarkable  for prominent interlobular septal thickening.  There is bilateral basilar dependent atelectasis/fibrotic change.  There are small bilateral pleural effusions.  There is bilateral gynecomastia.     The liver, spleen and adrenal glands are grossly unremarkable.  There is atrophic change of the pancreas without pancreatic ductal dilation.  The gallbladder is distended noting layering calculi or sludge.  The stomach is decompressed.  There is no biliary dilation.  There is strand-like ascites within the abdomen.     The kidneys enhance symmetrically without hydronephrosis.  There is right nonobstructive nephrolithiasis.  Several low attenuating lesions arise from the kidneys bilaterally, the majority of which are too small for characterization.  The largest lesion within the interpolar region of the left kidney measures 3.5 cm with attenuation suggesting cyst.  The bilateral ureters are unremarkable without calculi seen.  The urinary bladder is distended without wall thickening.  Air within the urinary bladder suggests sequela of catheterization although correlation with the same recommended.  The prostate is not enlarged.     There is a large amount of stool within the rectum noting there is presacral edema and mild perirectal indistinctness.  Several scattered colonic diverticula are noted without surrounding inflammation.  The terminal ileum and appendix are unremarkable.  The small bowel is grossly unremarkable.  There are several scattered shotty periaortic and paracaval lymph nodes.  There is atherosclerotic calcification of the aorta and its branches.  No focal organized pelvic fluid collection.     There is posttraumatic or postsurgical change of the right posterior iliac wing.  Degenerative changes are noted of the spine noting surgical changes.  There is osteopenia.  No significant inguinal lymphadenopathy.  There is mild body wall anasarca.     Impression:     1. The gallbladder is distended noting layering  cholelithiasis or sludge, no secondary findings to suggest acute cholecystitis.  2. Strand-like fluid within the abdomen and pelvis noting mild body wall anasarca and mild pleural effusions, correlation with volume overload advised.  3. Colonic diverticulosis without diverticulitis.  4. Large amount of stool within the rectum, early changes of stercoral proctitis is a consideration.  5. The urinary bladder is distended, air within the bladder may reflect sequela of catheterization however correlation with urinalysis is recommended.  6. Right nonobstructive nephrolithiasis.  7. Please see above for several additional findings.        Electronically signed by: Johnny Gilmore MD  Date:                                            04/13/2022  Time:                                           18:07    EXAMINATION:  NM GASTRIC EMPTYING     CLINICAL HISTORY:  chronic nausea in diabetic;     TECHNIQUE:  The patient received 1.0 mCi orally of sulfur colloid labeled meal in less than 10 minutes.     Anterior and posterior projection images were obtained immediately and at 60 minute intervals for 4 hours.     Geometric mean of the anterior and the posterior images was generated. The decay-corrected time activity curve and the percentage of the retention and emptying were obtained.     COMPARISON:  CT abdomen pelvis 04/13/2022.     FINDINGS:  At 4 hour(s) the percentage of retention is 2 % (normal retention at 4 hours is 10% and lower).     Impression:     Normal gastric emptying time.     I, Bam Booth MD, attest that I reviewed and interpreted the images.     Electronically signed by resident: Cisco Alvarado MD  Date:                                            04/29/2022  Time:                                           14:09     Electronically signed by: Bam Booth  Date:                                            04/29/2022  Time:                                           14:18        I have reviewed prior labs, imaging, and  notes.      Assessment:  1. Gastroesophageal reflux disease, unspecified whether esophagitis present    2. Abnormal finding on CT scan    3. Generalized abdominal pain        Plan:  Orders Placed This Encounter    Ambulatory referral/consult to General Surgery    Case Request Endoscopy: EGD (ESOPHAGOGASTRODUODENOSCOPY)     EGD to further evaluate recent onset of reflux especially at night  Protonix listed on med list but patient is unsure if he is currently taking.  Will request current medication list from [facility].  If patient is not taking Protonix consider starting Protonix 40 mg once a day.  Surgery referral for sludge noted in gallbladder  Start Metamucil if not currently taking  Clearance to hold blood thinners for procedure.  Eliquis and Plavix listed on med list but patient reports he is only taking Eliquis    Patient with history of diabetes mellitus type 2.  Last A1c was 9.7.  Recommend better control of diabetes as this could be contributing to the nausea and vomiting he was previously experiencing.  Gastric emptying study recently performed and was normal      Plan of care discussed with patient who is in agreement and verbalized understanding.     I have explained the planned procedures to the patient.The risks, benefits and alternatives of the procedure were also explained in detail. Patient verbalized understanding, all questions were answered. The patient agrees to proceed as planned    Follow Up:  Pending workup       Higher than average risk given patient is on anticoagulant and will need clearance to hold Plavix or Eliquis.      HIRAL Meneses,FNP-BC  [facility]

## 2022-09-01 ENCOUNTER — TELEPHONE (OUTPATIENT)
Dept: GASTROENTEROLOGY | Facility: CLINIC | Age: 77
End: 2022-09-01
Payer: MEDICARE

## 2022-09-02 RX ORDER — PSYLLIUM HUSK 3.4 G/12G
1 POWDER ORAL DAILY
Start: 2022-09-02 | End: 2022-09-06

## 2022-09-06 RX ORDER — SUCRALFATE 1 G/1
1 TABLET ORAL
Status: ON HOLD | COMMUNITY
End: 2023-03-01 | Stop reason: HOSPADM

## 2022-09-06 RX ORDER — ERGOCALCIFEROL 1.25 MG/1
50000 CAPSULE ORAL
COMMUNITY
End: 2022-09-06

## 2022-09-06 RX ORDER — ONDANSETRON 4 MG/1
4 TABLET, ORALLY DISINTEGRATING ORAL
COMMUNITY
End: 2022-10-13

## 2022-09-06 RX ORDER — CLOPIDOGREL BISULFATE 75 MG/1
75 TABLET ORAL DAILY
Status: ON HOLD | COMMUNITY
End: 2023-02-01 | Stop reason: HOSPADM

## 2022-09-06 RX ORDER — DOCUSATE SODIUM 100 MG/1
100 CAPSULE, LIQUID FILLED ORAL DAILY
Status: ON HOLD | COMMUNITY
End: 2023-03-01 | Stop reason: HOSPADM

## 2022-09-06 RX ORDER — PSYLLIUM HUSK 3.4 G/12G
1 POWDER ORAL 2 TIMES DAILY
Status: ON HOLD
Start: 2022-09-06 | End: 2023-03-01 | Stop reason: HOSPADM

## 2022-09-06 RX ORDER — SERTRALINE HYDROCHLORIDE 50 MG/1
50 TABLET, FILM COATED ORAL DAILY
Status: ON HOLD | COMMUNITY
End: 2023-03-01 | Stop reason: HOSPADM

## 2022-09-07 ENCOUNTER — DOCUMENTATION ONLY (OUTPATIENT)
Dept: GASTROENTEROLOGY | Facility: CLINIC | Age: 77
End: 2022-09-07
Payer: MEDICARE

## 2022-09-07 NOTE — PROGRESS NOTES
Spoke with Mr. Muñoz's nurse at [facility] for clarification regarding anticoagulants.  Patient currently taking both Eliquis and Plavix.  Currently have clearance to hold Eliquis but not Plavix.  Patient's nurse will obtain clearance to hold Plavix five days prior to procedure and Eliquis two days prior to procedure. Once obtained, she will fax clearance to us.

## 2022-09-09 ENCOUNTER — TELEPHONE (OUTPATIENT)
Dept: GASTROENTEROLOGY | Facility: CLINIC | Age: 77
End: 2022-09-09
Payer: MEDICARE

## 2022-09-09 NOTE — TELEPHONE ENCOUNTER
Returned call to [facility]. Pt's nurse Valeri requesting clarification for cardiac clearance. Advised Plavix needed to be held x5 days and Eliquis x2 days prior to procedure. Nurse Trammell advised she will fax the clearance today.

## 2022-09-09 NOTE — TELEPHONE ENCOUNTER
Cardiac clearance was received. Pt's PCP wants pt to hold Plavix x5 days and Eliquis x3 days prior to procedure. Advised Nurse Valeri at [facility]'s that Plavix does not need to be given today. She stated Plavix is normally administered at 5pm and that she would make sure both meds are held as ordered.

## 2022-09-12 ENCOUNTER — TELEPHONE (OUTPATIENT)
Dept: ENDOSCOPY | Facility: HOSPITAL | Age: 77
End: 2022-09-12
Payer: MEDICARE

## 2022-09-12 NOTE — TELEPHONE ENCOUNTER
Spoke to DAMIEN Farnsworth arrival time 1210. States Plavix was stopped on 09/09 and Eliquis on 09/11

## 2022-09-14 ENCOUNTER — ANESTHESIA EVENT (OUTPATIENT)
Dept: ENDOSCOPY | Facility: HOSPITAL | Age: 77
End: 2022-09-14
Payer: MEDICARE

## 2022-09-14 ENCOUNTER — ANESTHESIA (OUTPATIENT)
Dept: ENDOSCOPY | Facility: HOSPITAL | Age: 77
End: 2022-09-14
Payer: MEDICARE

## 2022-09-14 ENCOUNTER — TELEPHONE (OUTPATIENT)
Dept: GASTROENTEROLOGY | Facility: CLINIC | Age: 77
End: 2022-09-14
Payer: MEDICARE

## 2022-09-14 NOTE — TELEPHONE ENCOUNTER
----- Message from Umu Esquivel sent at 9/14/2022  8:30 AM CDT -----  Type:  Patient  Call    Who Called:Daughter   Would the patient rather a call back or a response via [facility]? Call back   Best Call Back Number:[phone]  Additional Information: pt had to cancel appt today... they gave him breakfast at the nursing home.. Daughter wants to reschedule for a morning procedure

## 2022-09-14 NOTE — ANESTHESIA PREPROCEDURE EVALUATION
09/14/2022  Kamar Muñoz is a 77 y.o., male for EGD under MAC. 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    Past Surgical History:   Procedure Laterality Date    BACK SURGERY      CATARACT EXTRACTION W/  INTRAOCULAR LENS IMPLANT Right     Per Dr Romero note 11/2018    COLONOSCOPY N/A 1/28/2019    Procedure: COLONOSCOPY Suprep;  Surgeon: Anh Johnson MD;  Location: [facility] ENDO;  Service: Endoscopy;  Laterality: N/A;    EYE SURGERY      HERNIA REPAIR      LEFT HEART CATHETERIZATION Left 1/9/2022    Procedure: CATHETERIZATION, HEART, LEFT;  Surgeon: Will Hurst III, MD;  Location: [facility] CATH LAB/EP;  Service: Cardiology;  Laterality: Left;    renal stones      SHOULDER OPEN ROTATOR CUFF REPAIR       Review of patient's allergies indicates:   Allergen Reactions    Iodine      Other reaction(s): swelling  Other reaction(s): Itching  Other reaction(s): Rash         Anesthesia Evaluation    I have reviewed the Patient Summary Reports.    I have reviewed the Nursing Notes.    I have reviewed the Medications.     Review of Systems  Social:  Former Smoker    Cardiovascular:   Exercise tolerance: good Hypertension CAD  Dysrhythmias atrial fibrillation    Pulmonary:   COPD    Renal/:   Chronic Renal Disease     Musculoskeletal:  Spine Disorders: cervical    Neurological:   CVA    Endocrine:   Diabetes        Physical Exam  General:  Well nourished      Airway/Jaw/Neck:  Airway Findings: Mallampati: II      Chest/Lungs:  Chest/Lungs Clear    Heart/Vascular:  Heart Findings: Normal        CONCLUSIONS     1 - Normal left ventricular systolic function (EF 60-65%).     2 - Normal left ventricular diastolic function.     3 - No wall motion abnormalities.     4 - Normal right ventricular systolic function .     5 - The estimated PA systolic pressure is 28 mmHg.             This document has been electronically    SIGNED BY: Huey Cifuentes MD On: 07/06/2018 14:28    Anesthesia Plan  Type of Anesthesia, risks & benefits discussed:  Anesthesia Type:  MAC    Patient's Preference:   Plan Factors:          Intra-op Monitoring Plan: Standard ASA Monitors  Intra-op Monitoring Plan Comments:   Post Op Pain Control Plan: multimodal analgesia  Post Op Pain Control Plan Comments:     Induction:    Beta Blocker:  Patient is not currently on a Beta-Blocker (No further documentation required).       Informed Consent:  Anesthesia consent signed with patient.  ASA Score: 3     Day of Surgery Review of History & Physical:              Ready For Surgery From Anesthesia Perspective.           Physical Exam  General: Well nourished    Airway:  Mallampati: II             Anesthesia Plan  Type of Anesthesia, risks & benefits discussed:    Anesthesia Type: MAC  Intra-op Monitoring Plan: Standard ASA Monitors  Post Op Pain Control Plan: multimodal analgesia  ASA Score: 3    Ready For Surgery From Anesthesia Perspective.       .

## 2022-09-14 NOTE — TELEPHONE ENCOUNTER
Spoke with patients daughter and she would like for patient to have the EGD rescheduled to a morning procedure. Patient has already stopped his Plavix. Discussed dates with daughter and since patient has already stopped the Plavix we will schedule him for tomorrow. Daughter has been given a 10am arrival time. Instructions reviewed with daughter.

## 2022-09-15 ENCOUNTER — HOSPITAL ENCOUNTER (OUTPATIENT)
Facility: HOSPITAL | Age: 77
Discharge: HOME | End: 2022-09-15
Attending: INTERNAL MEDICINE | Admitting: INTERNAL MEDICINE
Payer: MEDICARE

## 2022-09-15 VITALS
DIASTOLIC BLOOD PRESSURE: 57 MMHG | BODY MASS INDEX: 20.92 KG/M2 | TEMPERATURE: 98 F | HEIGHT: 74 IN | HEART RATE: 58 BPM | OXYGEN SATURATION: 98 % | SYSTOLIC BLOOD PRESSURE: 105 MMHG | RESPIRATION RATE: 15 BRPM | WEIGHT: 163 LBS

## 2022-09-15 DIAGNOSIS — K21.9 GERD (GASTROESOPHAGEAL REFLUX DISEASE): ICD-10-CM

## 2022-09-15 PROCEDURE — 37000009 HC ANESTHESIA EA ADD 15 MINS: Performed by: INTERNAL MEDICINE

## 2022-09-15 PROCEDURE — 43251 EGD REMOVE LESION SNARE: CPT | Mod: ,,, | Performed by: INTERNAL MEDICINE

## 2022-09-15 PROCEDURE — 88305 TISSUE EXAM BY PATHOLOGIST: ICD-10-PCS | Mod: 26,,, | Performed by: PATHOLOGY

## 2022-09-15 PROCEDURE — 43251 PR EGD, FLEX, W/REMOVAL, TUMOR/POLYP/LESION(S), SNARE: ICD-10-PCS | Mod: ,,, | Performed by: INTERNAL MEDICINE

## 2022-09-15 PROCEDURE — 27201089 HC SNARE, DISP (ANY): Performed by: INTERNAL MEDICINE

## 2022-09-15 PROCEDURE — 37000008 HC ANESTHESIA 1ST 15 MINUTES: Performed by: INTERNAL MEDICINE

## 2022-09-15 PROCEDURE — 43251 EGD REMOVE LESION SNARE: CPT | Performed by: INTERNAL MEDICINE

## 2022-09-15 PROCEDURE — 88305 TISSUE EXAM BY PATHOLOGIST: CPT | Mod: 26,,, | Performed by: PATHOLOGY

## 2022-09-15 PROCEDURE — 25000003 PHARM REV CODE 250: Performed by: INTERNAL MEDICINE

## 2022-09-15 PROCEDURE — 43239 EGD BIOPSY SINGLE/MULTIPLE: CPT | Mod: 59,,, | Performed by: INTERNAL MEDICINE

## 2022-09-15 PROCEDURE — 88305 TISSUE EXAM BY PATHOLOGIST: CPT | Performed by: PATHOLOGY

## 2022-09-15 PROCEDURE — 43239 EGD BIOPSY SINGLE/MULTIPLE: CPT | Mod: 59 | Performed by: INTERNAL MEDICINE

## 2022-09-15 PROCEDURE — 82962 GLUCOSE BLOOD TEST: CPT | Performed by: INTERNAL MEDICINE

## 2022-09-15 PROCEDURE — 25000003 PHARM REV CODE 250: Performed by: NURSE ANESTHETIST, CERTIFIED REGISTERED

## 2022-09-15 PROCEDURE — 27201012 HC FORCEPS, HOT/COLD, DISP: Performed by: INTERNAL MEDICINE

## 2022-09-15 PROCEDURE — 27201042 HC RETRIEVAL NET: Performed by: INTERNAL MEDICINE

## 2022-09-15 PROCEDURE — 63600175 PHARM REV CODE 636 W HCPCS: Performed by: NURSE ANESTHETIST, CERTIFIED REGISTERED

## 2022-09-15 PROCEDURE — 43239 PR EGD, FLEX, W/BIOPSY, SGL/MULTI: ICD-10-PCS | Mod: 59,,, | Performed by: INTERNAL MEDICINE

## 2022-09-15 RX ORDER — PROPOFOL 10 MG/ML
VIAL (ML) INTRAVENOUS
Status: DISCONTINUED | OUTPATIENT
Start: 2022-09-15 | End: 2022-09-15

## 2022-09-15 RX ORDER — SODIUM CHLORIDE 9 MG/ML
INJECTION, SOLUTION INTRAVENOUS CONTINUOUS
Status: DISCONTINUED | OUTPATIENT
Start: 2022-09-15 | End: 2022-09-15 | Stop reason: HOSPADM

## 2022-09-15 RX ORDER — PROPOFOL 10 MG/ML
VIAL (ML) INTRAVENOUS CONTINUOUS PRN
Status: DISCONTINUED | OUTPATIENT
Start: 2022-09-15 | End: 2022-09-15

## 2022-09-15 RX ORDER — LIDOCAINE HCL/PF 100 MG/5ML
SYRINGE (ML) INTRAVENOUS
Status: DISCONTINUED | OUTPATIENT
Start: 2022-09-15 | End: 2022-09-15

## 2022-09-15 RX ADMIN — TOPICAL ANESTHETIC 1 EACH: 200 SPRAY DENTAL; PERIODONTAL at 11:09

## 2022-09-15 RX ADMIN — LIDOCAINE HYDROCHLORIDE 50 MG: 20 INJECTION, SOLUTION INTRAVENOUS at 11:09

## 2022-09-15 RX ADMIN — SODIUM CHLORIDE: 0.9 INJECTION, SOLUTION INTRAVENOUS at 11:09

## 2022-09-15 RX ADMIN — PROPOFOL 30 MG: 10 INJECTION, EMULSION INTRAVENOUS at 11:09

## 2022-09-15 RX ADMIN — PROPOFOL 100 MCG/KG/MIN: 10 INJECTION, EMULSION INTRAVENOUS at 11:09

## 2022-09-15 NOTE — ANESTHESIA POSTPROCEDURE EVALUATION
Anesthesia Post Evaluation    Patient: Kamar Muñoz    Procedure(s) Performed: Procedure(s) (LRB):  EGD (ESOPHAGOGASTRODUODENOSCOPY) (N/A)    Final Anesthesia Type: MAC      Patient location during evaluation: GI PACU  Patient participation: Yes- Able to Participate  Level of consciousness: awake and alert and oriented  Post-procedure vital signs: reviewed and stable  Pain management: adequate  Airway patency: patent    PONV status at discharge: No PONV  Anesthetic complications: no      Cardiovascular status: blood pressure returned to baseline and hemodynamically stable  Respiratory status: unassisted, spontaneous ventilation and room air  Hydration status: euvolemic  Follow-up not needed.          Vitals Value Taken Time   BP 90/56 09/15/22 1217   Temp 98.2 09/15/22 1217   Pulse 59 09/15/22 1217   Resp 18 09/15/22 1217   SpO2 95 09/15/22 1217         No case tracking events are documented in the log.      Pain/Patrice Score: No data recorded

## 2022-09-15 NOTE — PROVATION PATIENT INSTRUCTIONS
Discharge Summary/Instructions after an Endoscopic Procedure  Patient Name: Kamar Muñoz  Patient MRN: [id number]  Patient YOB: 1945  Thursday, September 15, 2022  Dashawn Evans MD  Dear patient,  As a result of recent federal legislation (The Federal Cures Act), you may   receive lab or pathology results from your procedure in your [facility]   account before your physician is able to contact you. Your physician or   their representative will relay the results to you with their   recommendations at their soonest availability.  Thank you,  Your health is very important to us during the Covid Crisis. Following your   procedure today, you will receive a daily text for 2 weeks asking about   signs or symptoms of Covid 19.  Please respond to this text when you   receive it so we can follow up and keep you as safe as possible.   RESTRICTIONS:  During your procedure today, you received medications for sedation.  These   medications may affect your judgment, balance and coordination.  Therefore,   for 24 hours, you have the following restrictions:   - DO NOT drive a car, operate machinery, make legal/financial decisions,   sign important papers or drink alcohol.    ACTIVITY:  Today: no heavy lifting, straining or running due to procedural   sedation/anesthesia.  The following day: return to full activity including work.  DIET:  Eat and drink normally unless instructed otherwise.     TREATMENT FOR COMMON SIDE EFFECTS:  - Mild abdominal pain, nausea, belching, bloating or excessive gas:  rest,   eat lightly and use a heating pad.  - Sore Throat: treat with throat lozenges and/or gargle with warm salt   water.  - Because air was used during the procedure, expelling large amounts of air   from your rectum or belching is normal.  - If a bowel prep was taken, you may not have a bowel movement for 1-3 days.    This is normal.  SYMPTOMS TO WATCH FOR AND REPORT TO YOUR PHYSICIAN:  1. Abdominal pain or  bloating, other than gas cramps.  2. Chest pain.  3. Back pain.  4. Signs of infection such as: chills or fever occurring within 24 hours   after the procedure.  5. Rectal bleeding, which would show as bright red, maroon, or black stools.   (A tablespoon of blood from the rectum is not serious, especially if   hemorrhoids are present.)  6. Vomiting.  7. Weakness or dizziness.  GO DIRECTLY TO THE NEAREST EMERGENCY ROOM IF YOU HAVE ANY OF THE FOLLOWING:      Difficulty breathing              Chills and/or fever over 101 F   Persistent vomiting and/or vomiting blood   Severe abdominal pain   Severe chest pain   Black, tarry stools   Bleeding- more than one tablespoon   Any other symptom or condition that you feel may need urgent attention  Your doctor recommends these additional instructions:  If any biopsies were taken, your doctors clinic will contact you in 1 to 2   weeks with any results.  - Discharge patient to home.   - Resume previous diet.   - Continue present medications.   - Await pathology results.   - Return to nurse practitioner PRN.  For questions, problems or results please call your physician - Dashawn Evans MD.  EMERGENCY PHONE NUMBER: [phone],  LAB RESULTS: [phone]  IF A COMPLICATION OR EMERGENCY SITUATION ARISES AND YOU ARE UNABLE TO REACH   YOUR PHYSICIAN - GO DIRECTLY TO THE EMERGENCY ROOM.  Dashawn Evans MD  9/15/2022 12:25:20 PM  This report has been verified and signed electronically.  Dear patient,  As a result of recent federal legislation (The Federal Cures Act), you may   receive lab or pathology results from your procedure in your [facility]   account before your physician is able to contact you. Your physician or   their representative will relay the results to you with their   recommendations at their soonest availability.  Thank you,  PROVATION

## 2022-09-15 NOTE — TRANSFER OF CARE
"Anesthesia Transfer of Care Note    Patient: Kamar Muñoz    Procedure(s) Performed: Procedure(s) (LRB):  EGD (ESOPHAGOGASTRODUODENOSCOPY) (N/A)    Patient location: GI    Anesthesia Type: MAC    Transport from OR: Transported from OR on room air with adequate spontaneous ventilation    Post pain: adequate analgesia    Post assessment: no apparent anesthetic complications and tolerated procedure well    Post vital signs: stable    Level of consciousness: awake, alert and oriented    Nausea/Vomiting: no nausea/vomiting    Complications: none    Transfer of care protocol was followed      Last vitals:   Visit Vitals  BP (!) 159/78 (BP Location: Left arm, Patient Position: Lying)   Pulse (!) 51   Temp 36.6 °C (97.9 °F) (Temporal)   Resp 18   Ht 6' 2" (1.88 m)   Wt 73.9 kg (163 lb)   SpO2 98%   BMI 20.93 kg/m²     "

## 2022-09-15 NOTE — PLAN OF CARE
Recovery complete. Patient recovered to baseline. Discharge instructions reviewed with patient. VSS.

## 2022-09-15 NOTE — OR NURSING
Report given to Nurse Marion.Patient is aaox4 , nad .   VSS stable   Patient is ready to be picked up

## 2022-09-20 ENCOUNTER — TELEPHONE (OUTPATIENT)
Dept: FAMILY MEDICINE | Facility: CLINIC | Age: 77
End: 2022-09-20
Payer: MEDICARE

## 2022-09-20 DIAGNOSIS — I69.30 HISTORY OF CEREBROVASCULAR ACCIDENT (CVA) WITH RESIDUAL DEFICIT: Primary | ICD-10-CM

## 2022-09-21 LAB
FINAL PATHOLOGIC DIAGNOSIS: NORMAL
GROSS: NORMAL
Lab: NORMAL

## 2022-09-27 ENCOUNTER — OFFICE VISIT (OUTPATIENT)
Dept: FAMILY MEDICINE | Facility: CLINIC | Age: 77
End: 2022-09-27
Payer: MEDICARE

## 2022-09-27 VITALS
BODY MASS INDEX: 21.73 KG/M2 | HEIGHT: 74 IN | OXYGEN SATURATION: 96 % | WEIGHT: 169.31 LBS | DIASTOLIC BLOOD PRESSURE: 50 MMHG | HEART RATE: 73 BPM | SYSTOLIC BLOOD PRESSURE: 86 MMHG

## 2022-09-27 DIAGNOSIS — I21.4 NSTEMI (NON-ST ELEVATED MYOCARDIAL INFARCTION): Chronic | ICD-10-CM

## 2022-09-27 DIAGNOSIS — I48.0 PAROXYSMAL ATRIAL FIBRILLATION: Chronic | ICD-10-CM

## 2022-09-27 DIAGNOSIS — R29.898 LEFT ARM WEAKNESS: ICD-10-CM

## 2022-09-27 DIAGNOSIS — E11.65 TYPE 2 DIABETES MELLITUS WITH HYPERGLYCEMIA, WITH LONG-TERM CURRENT USE OF INSULIN: Chronic | ICD-10-CM

## 2022-09-27 DIAGNOSIS — Z79.4 TYPE 2 DIABETES MELLITUS WITH HYPERGLYCEMIA, WITH LONG-TERM CURRENT USE OF INSULIN: Chronic | ICD-10-CM

## 2022-09-27 DIAGNOSIS — I10 ESSENTIAL HYPERTENSION: Primary | Chronic | ICD-10-CM

## 2022-09-27 DIAGNOSIS — R54 FRAILTY: ICD-10-CM

## 2022-09-27 PROCEDURE — 99215 PR OFFICE/OUTPT VISIT, EST, LEVL V, 40-54 MIN: ICD-10-PCS | Mod: S$PBB,,, | Performed by: FAMILY MEDICINE

## 2022-09-27 PROCEDURE — 99215 OFFICE O/P EST HI 40 MIN: CPT | Mod: S$PBB,,, | Performed by: FAMILY MEDICINE

## 2022-09-27 PROCEDURE — 99213 OFFICE O/P EST LOW 20 MIN: CPT | Mod: PBBFAC,PO | Performed by: FAMILY MEDICINE

## 2022-09-27 PROCEDURE — 99999 PR PBB SHADOW E&M-EST. PATIENT-LVL III: ICD-10-PCS | Mod: PBBFAC,,, | Performed by: FAMILY MEDICINE

## 2022-09-27 PROCEDURE — 99999 PR PBB SHADOW E&M-EST. PATIENT-LVL III: CPT | Mod: PBBFAC,,, | Performed by: FAMILY MEDICINE

## 2022-09-27 NOTE — PROGRESS NOTES
Subjective:       Patient ID: Kamar Muñoz is a 77 y.o. male.    Chief Complaint: Follow-up    77 years old male came to the clinic for blood pressure check.  Blood pressure today was in the low side.  Patient currently at the nursing home.  Patient status post stroke associated with weakness.  Patient with significant lose of his capacity to do his activities of daily living.  He is now bed ridden and frail.  Insulin regimen was adjusted with a sliding scale.  No recent flare ups of atrial fibrillation.  No chest pain, palpitation, orthopnea or PND.    Follow-up  Pertinent negatives include no chest pain.   Review of Systems   Constitutional: Negative.    HENT: Negative.     Eyes: Negative.    Respiratory: Negative.     Cardiovascular: Negative.  Negative for chest pain, palpitations, leg swelling and claudication.   Gastrointestinal: Negative.    Genitourinary: Negative.    Musculoskeletal: Negative.    Integumentary:  Negative.   Neurological: Negative.    Psychiatric/Behavioral: Negative.         Objective:      Physical Exam  Vitals and nursing note reviewed.   Constitutional:       General: He is not in acute distress.     Appearance: He is well-developed. He is not diaphoretic.   HENT:      Head: Normocephalic and atraumatic.      Right Ear: External ear normal.      Left Ear: External ear normal.      Nose: Nose normal.      Mouth/Throat:      Pharynx: No oropharyngeal exudate.   Eyes:      General: No scleral icterus.        Right eye: No discharge.         Left eye: No discharge.      Conjunctiva/sclera: Conjunctivae normal.      Pupils: Pupils are equal, round, and reactive to light.   Neck:      Thyroid: No thyromegaly.      Vascular: No JVD.      Trachea: No tracheal deviation.   Cardiovascular:      Rate and Rhythm: Normal rate and regular rhythm.      Heart sounds: Normal heart sounds. No murmur heard.    No friction rub. No gallop.   Pulmonary:      Effort: Pulmonary effort is normal. No  respiratory distress.      Breath sounds: Normal breath sounds. No stridor. No wheezing or rales.   Chest:      Chest wall: No tenderness.   Abdominal:      General: Bowel sounds are normal. There is no distension.      Palpations: Abdomen is soft. There is no mass.      Tenderness: There is no abdominal tenderness. There is no guarding or rebound.   Musculoskeletal:         General: No tenderness. Normal range of motion.      Cervical back: Normal range of motion and neck supple.   Lymphadenopathy:      Cervical: No cervical adenopathy.   Skin:     General: Skin is warm and dry.      Coloration: Skin is not pale.      Findings: No erythema or rash.   Neurological:      Mental Status: He is alert and oriented to person, place, and time.      Cranial Nerves: No cranial nerve deficit.      Motor: Weakness present. No abnormal muscle tone.      Coordination: Coordination abnormal.      Gait: Gait abnormal.      Deep Tendon Reflexes: Reflexes are normal and symmetric. Reflexes normal.   Psychiatric:         Mood and Affect: Mood is depressed.         Behavior: Behavior normal.         Thought Content: Thought content normal.         Judgment: Judgment normal.       Assessment:       Problem List Items Addressed This Visit       Essential hypertension - Primary (Chronic)    Relevant Orders    CBC Auto Differential    Comprehensive Metabolic Panel    Lipid Panel    Type 2 diabetes mellitus with hyperglycemia, with long-term current use of insulin (Chronic)    Relevant Orders    Hemoglobin A1C    Microalbumin/Creatinine Ratio, Urine    Paroxysmal atrial fibrillation (Chronic)    Relevant Orders    CBC Auto Differential    Comprehensive Metabolic Panel    NSTEMI (non-ST elevated myocardial infarction) (Chronic)    Relevant Orders    Lipid Panel    Left arm weakness    Frailty    Relevant Orders    CBC Auto Differential    Comprehensive Metabolic Panel         Plan:             Kamar was seen today for  follow-up.    Diagnoses and all orders for this visit:    Essential hypertension  -     CBC Auto Differential; Future  -     Comprehensive Metabolic Panel; Future  -     Lipid Panel; Future    Type 2 diabetes mellitus with hyperglycemia, with long-term current use of insulin  -     Hemoglobin A1C; Future  -     Microalbumin/Creatinine Ratio, Urine; Future    Paroxysmal atrial fibrillation  -     CBC Auto Differential; Future  -     Comprehensive Metabolic Panel; Future    Left arm weakness    NSTEMI (non-ST elevated myocardial infarction)  -     Lipid Panel; Future    Frailty  -     CBC Auto Differential; Future  -     Comprehensive Metabolic Panel; Future   Continue monitoring blood pressure at home, low sodium diet.    Fall precautions.

## 2022-09-28 ENCOUNTER — TELEPHONE (OUTPATIENT)
Dept: ADMINISTRATIVE | Facility: OTHER | Age: 77
End: 2022-09-28
Payer: MEDICARE

## 2022-09-28 NOTE — PROGRESS NOTES
Pathologic findings for recent EGD reviewed.  No evidence of H pylori infection identified.  Some mild inflammation was noted.  Continue current medications.  Follow up as needed with Zoe

## 2022-09-30 ENCOUNTER — TELEPHONE (OUTPATIENT)
Dept: GASTROENTEROLOGY | Facility: CLINIC | Age: 77
End: 2022-09-30
Payer: MEDICARE

## 2022-10-05 ENCOUNTER — OFFICE VISIT (OUTPATIENT)
Dept: SURGERY | Facility: CLINIC | Age: 77
DRG: 871 | End: 2022-10-05
Payer: MEDICARE

## 2022-10-05 ENCOUNTER — HOSPITAL ENCOUNTER (INPATIENT)
Facility: HOSPITAL | Age: 77
LOS: 6 days | Discharge: NURSING, REHAB OR LONG-TERM CARE | DRG: 871 | End: 2022-10-11
Attending: EMERGENCY MEDICINE | Admitting: INTERNAL MEDICINE
Payer: MEDICARE

## 2022-10-05 VITALS — BODY MASS INDEX: 21.1 KG/M2 | HEIGHT: 74 IN | WEIGHT: 164.44 LBS

## 2022-10-05 DIAGNOSIS — E10.10 DIABETIC KETOACIDOSIS WITHOUT COMA ASSOCIATED WITH TYPE 1 DIABETES MELLITUS: ICD-10-CM

## 2022-10-05 DIAGNOSIS — I10 PRIMARY HYPERTENSION: ICD-10-CM

## 2022-10-05 DIAGNOSIS — A41.9 SEPSIS, DUE TO UNSPECIFIED ORGANISM, UNSPECIFIED WHETHER ACUTE ORGAN DYSFUNCTION PRESENT: Primary | ICD-10-CM

## 2022-10-05 DIAGNOSIS — E11.51 TYPE 2 DIABETES MELLITUS WITH DIABETIC PERIPHERAL ANGIOPATHY WITHOUT GANGRENE, WITH LONG-TERM CURRENT USE OF INSULIN: ICD-10-CM

## 2022-10-05 DIAGNOSIS — R93.89 ABNORMAL FINDING ON CT SCAN: ICD-10-CM

## 2022-10-05 DIAGNOSIS — I95.9 HYPOTENSION: ICD-10-CM

## 2022-10-05 DIAGNOSIS — Z79.4 TYPE 2 DIABETES MELLITUS WITH DIABETIC PERIPHERAL ANGIOPATHY WITHOUT GANGRENE, WITH LONG-TERM CURRENT USE OF INSULIN: ICD-10-CM

## 2022-10-05 DIAGNOSIS — L85.8 KA (KERATOACANTHOMA): ICD-10-CM

## 2022-10-05 DIAGNOSIS — E11.10 DKA (DIABETIC KETOACIDOSIS): ICD-10-CM

## 2022-10-05 DIAGNOSIS — R41.3 MEMORY LOSS: ICD-10-CM

## 2022-10-05 PROBLEM — Z86.69 HISTORY OF PARTIAL SEIZURES: Status: ACTIVE | Noted: 2022-01-26

## 2022-10-05 LAB
ALBUMIN SERPL BCP-MCNC: 3.2 G/DL (ref 3.5–5.2)
ALLENS TEST: ABNORMAL
ALP SERPL-CCNC: 148 U/L (ref 55–135)
ALT SERPL W/O P-5'-P-CCNC: 35 U/L (ref 10–44)
ANION GAP SERPL CALC-SCNC: 31 MMOL/L (ref 8–16)
ANION GAP SERPL CALC-SCNC: ABNORMAL MMOL/L (ref 8–16)
ANION GAP SERPL CALC-SCNC: ABNORMAL MMOL/L (ref 8–16)
AST SERPL-CCNC: 32 U/L (ref 10–40)
B-OH-BUTYR BLD STRIP-SCNC: 5.9 MMOL/L (ref 0–0.5)
BASOPHILS # BLD AUTO: 0.03 K/UL (ref 0–0.2)
BASOPHILS NFR BLD: 0.2 % (ref 0–1.9)
BILIRUB SERPL-MCNC: 0.6 MG/DL (ref 0.1–1)
BUN SERPL-MCNC: 61 MG/DL (ref 8–23)
CALCIUM SERPL-MCNC: 8.6 MG/DL (ref 8.7–10.5)
CALCIUM SERPL-MCNC: 8.7 MG/DL (ref 8.7–10.5)
CALCIUM SERPL-MCNC: 9.1 MG/DL (ref 8.7–10.5)
CHLORIDE SERPL-SCNC: 88 MMOL/L (ref 95–110)
CHLORIDE SERPL-SCNC: 88 MMOL/L (ref 95–110)
CHLORIDE SERPL-SCNC: 89 MMOL/L (ref 95–110)
CO2 SERPL-SCNC: 9 MMOL/L (ref 23–29)
CO2 SERPL-SCNC: <5 MMOL/L (ref 23–29)
CO2 SERPL-SCNC: <5 MMOL/L (ref 23–29)
CREAT SERPL-MCNC: 3.1 MG/DL (ref 0.5–1.4)
CREAT SERPL-MCNC: 3.1 MG/DL (ref 0.5–1.4)
CREAT SERPL-MCNC: 3.2 MG/DL (ref 0.5–1.4)
DIFFERENTIAL METHOD: ABNORMAL
EOSINOPHIL # BLD AUTO: 0 K/UL (ref 0–0.5)
EOSINOPHIL NFR BLD: 0.2 % (ref 0–8)
ERYTHROCYTE [DISTWIDTH] IN BLOOD BY AUTOMATED COUNT: 11.8 % (ref 11.5–14.5)
EST. GFR  (NO RACE VARIABLE): 19 ML/MIN/1.73 M^2
EST. GFR  (NO RACE VARIABLE): 19.7 ML/MIN/1.73 M^2
EST. GFR  (NO RACE VARIABLE): 19.7 ML/MIN/1.73 M^2
ESTIMATED AVG GLUCOSE: 258 MG/DL (ref 68–131)
GLUCOSE SERPL-MCNC: 1042 MG/DL (ref 70–110)
GLUCOSE SERPL-MCNC: 1068 MG/DL (ref 70–110)
GLUCOSE SERPL-MCNC: 1109 MG/DL (ref 70–110)
GLUCOSE SERPL-MCNC: 1109 MG/DL (ref 70–110)
GLUCOSE SERPL-MCNC: 1110 MG/DL (ref 70–110)
GLUCOSE SERPL-MCNC: 837 MG/DL (ref 70–110)
HBA1C MFR BLD: 10.6 % (ref 4–5.6)
HCO3 UR-SCNC: 9.9 MMOL/L (ref 24–28)
HCT VFR BLD AUTO: 43.6 % (ref 40–54)
HGB BLD-MCNC: 13.4 G/DL (ref 14–18)
IMM GRANULOCYTES # BLD AUTO: 0.09 K/UL (ref 0–0.04)
IMM GRANULOCYTES NFR BLD AUTO: 0.7 % (ref 0–0.5)
LACTATE SERPL-SCNC: 10.6 MMOL/L (ref 0.5–2.2)
LACTATE SERPL-SCNC: 11.4 MMOL/L (ref 0.5–2.2)
LACTATE SERPL-SCNC: >12 MMOL/L (ref 0.5–2.2)
LIPASE SERPL-CCNC: 8 U/L (ref 4–60)
LYMPHOCYTES # BLD AUTO: 1.4 K/UL (ref 1–4.8)
LYMPHOCYTES NFR BLD: 11.2 % (ref 18–48)
MAGNESIUM SERPL-MCNC: 2.1 MG/DL (ref 1.6–2.6)
MAGNESIUM SERPL-MCNC: 2.1 MG/DL (ref 1.6–2.6)
MCH RBC QN AUTO: 30 PG (ref 27–31)
MCHC RBC AUTO-ENTMCNC: 30.7 G/DL (ref 32–36)
MCV RBC AUTO: 98 FL (ref 82–98)
MONOCYTES # BLD AUTO: 0.6 K/UL (ref 0.3–1)
MONOCYTES NFR BLD: 4.8 % (ref 4–15)
NEUTROPHILS # BLD AUTO: 10.5 K/UL (ref 1.8–7.7)
NEUTROPHILS NFR BLD: 82.9 % (ref 38–73)
NRBC BLD-RTO: 0 /100 WBC
PCO2 BLDA: 38.6 MMHG (ref 35–45)
PH SMN: 7.02 [PH] (ref 7.35–7.45)
PHOSPHATE SERPL-MCNC: 10 MG/DL (ref 2.7–4.5)
PHOSPHATE SERPL-MCNC: 10.1 MG/DL (ref 2.7–4.5)
PHOSPHATE SERPL-MCNC: 9.2 MG/DL (ref 2.7–4.5)
PLATELET # BLD AUTO: 200 K/UL (ref 150–450)
PMV BLD AUTO: 11 FL (ref 9.2–12.9)
PO2 BLDA: 27 MMHG (ref 40–60)
POC BE: -21 MMOL/L
POC SATURATED O2: 28 % (ref 95–100)
POC TCO2: 11 MMOL/L (ref 24–29)
POTASSIUM SERPL-SCNC: 5.6 MMOL/L (ref 3.5–5.1)
POTASSIUM SERPL-SCNC: 6.3 MMOL/L (ref 3.5–5.1)
POTASSIUM SERPL-SCNC: 6.5 MMOL/L (ref 3.5–5.1)
PROCALCITONIN SERPL IA-MCNC: 0.69 NG/ML
PROT SERPL-MCNC: 7 G/DL (ref 6–8.4)
RBC # BLD AUTO: 4.46 M/UL (ref 4.6–6.2)
SAMPLE: ABNORMAL
SITE: ABNORMAL
SODIUM SERPL-SCNC: 127 MMOL/L (ref 136–145)
SODIUM SERPL-SCNC: 127 MMOL/L (ref 136–145)
SODIUM SERPL-SCNC: 128 MMOL/L (ref 136–145)
TROPONIN I SERPL DL<=0.01 NG/ML-MCNC: 0.01 NG/ML (ref 0–0.03)
WBC # BLD AUTO: 12.61 K/UL (ref 3.9–12.7)

## 2022-10-05 PROCEDURE — 99900035 HC TECH TIME PER 15 MIN (STAT)

## 2022-10-05 PROCEDURE — 85025 COMPLETE CBC W/AUTO DIFF WBC: CPT | Performed by: EMERGENCY MEDICINE

## 2022-10-05 PROCEDURE — 84100 ASSAY OF PHOSPHORUS: CPT | Mod: 91 | Performed by: STUDENT IN AN ORGANIZED HEALTH CARE EDUCATION/TRAINING PROGRAM

## 2022-10-05 PROCEDURE — 83735 ASSAY OF MAGNESIUM: CPT | Mod: 91 | Performed by: STUDENT IN AN ORGANIZED HEALTH CARE EDUCATION/TRAINING PROGRAM

## 2022-10-05 PROCEDURE — 96365 THER/PROPH/DIAG IV INF INIT: CPT

## 2022-10-05 PROCEDURE — 94761 N-INVAS EAR/PLS OXIMETRY MLT: CPT

## 2022-10-05 PROCEDURE — 25000003 PHARM REV CODE 250: Performed by: STUDENT IN AN ORGANIZED HEALTH CARE EDUCATION/TRAINING PROGRAM

## 2022-10-05 PROCEDURE — 20000000 HC ICU ROOM

## 2022-10-05 PROCEDURE — 99291 CRITICAL CARE FIRST HOUR: CPT | Mod: ,,, | Performed by: EMERGENCY MEDICINE

## 2022-10-05 PROCEDURE — 93005 ELECTROCARDIOGRAM TRACING: CPT

## 2022-10-05 PROCEDURE — 96367 TX/PROPH/DG ADDL SEQ IV INF: CPT | Mod: 59

## 2022-10-05 PROCEDURE — 99285 EMERGENCY DEPT VISIT HI MDM: CPT | Mod: 25,27

## 2022-10-05 PROCEDURE — 99291 PR CRITICAL CARE, E/M 30-74 MINUTES: ICD-10-PCS | Mod: ,,, | Performed by: EMERGENCY MEDICINE

## 2022-10-05 PROCEDURE — 83605 ASSAY OF LACTIC ACID: CPT | Mod: 91 | Performed by: STUDENT IN AN ORGANIZED HEALTH CARE EDUCATION/TRAINING PROGRAM

## 2022-10-05 PROCEDURE — 87086 URINE CULTURE/COLONY COUNT: CPT | Performed by: STUDENT IN AN ORGANIZED HEALTH CARE EDUCATION/TRAINING PROGRAM

## 2022-10-05 PROCEDURE — 80053 COMPREHEN METABOLIC PANEL: CPT | Performed by: EMERGENCY MEDICINE

## 2022-10-05 PROCEDURE — 63600175 PHARM REV CODE 636 W HCPCS: Performed by: STUDENT IN AN ORGANIZED HEALTH CARE EDUCATION/TRAINING PROGRAM

## 2022-10-05 PROCEDURE — 99204 PR OFFICE/OUTPT VISIT, NEW, LEVL IV, 45-59 MIN: ICD-10-PCS | Mod: S$PBB,,, | Performed by: STUDENT IN AN ORGANIZED HEALTH CARE EDUCATION/TRAINING PROGRAM

## 2022-10-05 PROCEDURE — 25000003 PHARM REV CODE 250: Performed by: EMERGENCY MEDICINE

## 2022-10-05 PROCEDURE — 93010 ELECTROCARDIOGRAM REPORT: CPT | Mod: 76,,, | Performed by: INTERNAL MEDICINE

## 2022-10-05 PROCEDURE — 93010 EKG 12-LEAD: ICD-10-PCS | Mod: ,,, | Performed by: INTERNAL MEDICINE

## 2022-10-05 PROCEDURE — 82010 KETONE BODYS QUAN: CPT | Performed by: EMERGENCY MEDICINE

## 2022-10-05 PROCEDURE — 84145 PROCALCITONIN (PCT): CPT | Performed by: EMERGENCY MEDICINE

## 2022-10-05 PROCEDURE — 80048 BASIC METABOLIC PNL TOTAL CA: CPT | Mod: XB | Performed by: EMERGENCY MEDICINE

## 2022-10-05 PROCEDURE — 87040 BLOOD CULTURE FOR BACTERIA: CPT | Mod: 59 | Performed by: EMERGENCY MEDICINE

## 2022-10-05 PROCEDURE — 82803 BLOOD GASES ANY COMBINATION: CPT

## 2022-10-05 PROCEDURE — 83735 ASSAY OF MAGNESIUM: CPT | Performed by: EMERGENCY MEDICINE

## 2022-10-05 PROCEDURE — 99214 OFFICE O/P EST MOD 30 MIN: CPT | Mod: PBBFAC,PO,25 | Performed by: STUDENT IN AN ORGANIZED HEALTH CARE EDUCATION/TRAINING PROGRAM

## 2022-10-05 PROCEDURE — 83605 ASSAY OF LACTIC ACID: CPT | Performed by: EMERGENCY MEDICINE

## 2022-10-05 PROCEDURE — 84484 ASSAY OF TROPONIN QUANT: CPT

## 2022-10-05 PROCEDURE — 83036 HEMOGLOBIN GLYCOSYLATED A1C: CPT | Performed by: EMERGENCY MEDICINE

## 2022-10-05 PROCEDURE — 83690 ASSAY OF LIPASE: CPT | Performed by: EMERGENCY MEDICINE

## 2022-10-05 PROCEDURE — 99999 PR PBB SHADOW E&M-EST. PATIENT-LVL IV: ICD-10-PCS | Mod: PBBFAC,,, | Performed by: STUDENT IN AN ORGANIZED HEALTH CARE EDUCATION/TRAINING PROGRAM

## 2022-10-05 PROCEDURE — 80235 DRUG ASSAY LACOSAMIDE: CPT | Performed by: STUDENT IN AN ORGANIZED HEALTH CARE EDUCATION/TRAINING PROGRAM

## 2022-10-05 PROCEDURE — 96375 TX/PRO/DX INJ NEW DRUG ADDON: CPT | Mod: 59

## 2022-10-05 PROCEDURE — 93010 ELECTROCARDIOGRAM REPORT: CPT | Mod: ,,, | Performed by: INTERNAL MEDICINE

## 2022-10-05 PROCEDURE — 63600175 PHARM REV CODE 636 W HCPCS: Performed by: EMERGENCY MEDICINE

## 2022-10-05 PROCEDURE — 99999 PR PBB SHADOW E&M-EST. PATIENT-LVL IV: CPT | Mod: PBBFAC,,, | Performed by: STUDENT IN AN ORGANIZED HEALTH CARE EDUCATION/TRAINING PROGRAM

## 2022-10-05 PROCEDURE — 96361 HYDRATE IV INFUSION ADD-ON: CPT | Mod: 59

## 2022-10-05 PROCEDURE — 81001 URINALYSIS AUTO W/SCOPE: CPT | Performed by: STUDENT IN AN ORGANIZED HEALTH CARE EDUCATION/TRAINING PROGRAM

## 2022-10-05 PROCEDURE — 82947 ASSAY GLUCOSE BLOOD QUANT: CPT | Mod: 91 | Performed by: INTERNAL MEDICINE

## 2022-10-05 PROCEDURE — 84100 ASSAY OF PHOSPHORUS: CPT | Performed by: EMERGENCY MEDICINE

## 2022-10-05 PROCEDURE — 99204 OFFICE O/P NEW MOD 45 MIN: CPT | Mod: S$PBB,,, | Performed by: STUDENT IN AN ORGANIZED HEALTH CARE EDUCATION/TRAINING PROGRAM

## 2022-10-05 PROCEDURE — 80047 BASIC METABLC PNL IONIZED CA: CPT

## 2022-10-05 RX ORDER — PANTOPRAZOLE SODIUM 40 MG/1
40 TABLET, DELAYED RELEASE ORAL DAILY
Status: DISCONTINUED | OUTPATIENT
Start: 2022-10-06 | End: 2022-10-11 | Stop reason: HOSPADM

## 2022-10-05 RX ORDER — CLOPIDOGREL BISULFATE 75 MG/1
75 TABLET ORAL DAILY
Status: DISCONTINUED | OUTPATIENT
Start: 2022-10-06 | End: 2022-10-11 | Stop reason: HOSPADM

## 2022-10-05 RX ORDER — SODIUM CHLORIDE, SODIUM LACTATE, POTASSIUM CHLORIDE, CALCIUM CHLORIDE 600; 310; 30; 20 MG/100ML; MG/100ML; MG/100ML; MG/100ML
INJECTION, SOLUTION INTRAVENOUS CONTINUOUS
Status: DISCONTINUED | OUTPATIENT
Start: 2022-10-05 | End: 2022-10-05

## 2022-10-05 RX ORDER — AMIODARONE HYDROCHLORIDE 200 MG/1
200 TABLET ORAL DAILY
Status: DISCONTINUED | OUTPATIENT
Start: 2022-10-06 | End: 2022-10-11 | Stop reason: HOSPADM

## 2022-10-05 RX ORDER — ONDANSETRON 2 MG/ML
4 INJECTION INTRAMUSCULAR; INTRAVENOUS EVERY 6 HOURS PRN
Status: DISCONTINUED | OUTPATIENT
Start: 2022-10-05 | End: 2022-10-06

## 2022-10-05 RX ORDER — SODIUM CHLORIDE, SODIUM LACTATE, POTASSIUM CHLORIDE, CALCIUM CHLORIDE 600; 310; 30; 20 MG/100ML; MG/100ML; MG/100ML; MG/100ML
INJECTION, SOLUTION INTRAVENOUS CONTINUOUS
Status: DISCONTINUED | OUTPATIENT
Start: 2022-10-05 | End: 2022-10-06

## 2022-10-05 RX ORDER — NOREPINEPHRINE BITARTRATE/D5W 4MG/250ML
0-3 PLASTIC BAG, INJECTION (ML) INTRAVENOUS CONTINUOUS
Status: DISCONTINUED | OUTPATIENT
Start: 2022-10-05 | End: 2022-10-06

## 2022-10-05 RX ORDER — CLOPIDOGREL BISULFATE 75 MG/1
75 TABLET ORAL DAILY
Status: DISCONTINUED | OUTPATIENT
Start: 2022-10-06 | End: 2022-10-05

## 2022-10-05 RX ORDER — ATORVASTATIN CALCIUM 40 MG/1
40 TABLET, FILM COATED ORAL DAILY
Status: DISCONTINUED | OUTPATIENT
Start: 2022-10-06 | End: 2022-10-11 | Stop reason: HOSPADM

## 2022-10-05 RX ORDER — ONDANSETRON 2 MG/ML
4 INJECTION INTRAMUSCULAR; INTRAVENOUS ONCE
Status: COMPLETED | OUTPATIENT
Start: 2022-10-05 | End: 2022-10-05

## 2022-10-05 RX ORDER — SODIUM CHLORIDE 0.9 % (FLUSH) 0.9 %
10 SYRINGE (ML) INJECTION
Status: DISCONTINUED | OUTPATIENT
Start: 2022-10-05 | End: 2022-10-11 | Stop reason: HOSPADM

## 2022-10-05 RX ORDER — LACOSAMIDE 50 MG/1
100 TABLET ORAL EVERY 12 HOURS
Status: DISCONTINUED | OUTPATIENT
Start: 2022-10-05 | End: 2022-10-11 | Stop reason: HOSPADM

## 2022-10-05 RX ORDER — SERTRALINE HYDROCHLORIDE 50 MG/1
50 TABLET, FILM COATED ORAL DAILY
Status: DISCONTINUED | OUTPATIENT
Start: 2022-10-06 | End: 2022-10-11 | Stop reason: HOSPADM

## 2022-10-05 RX ORDER — ACETAMINOPHEN 325 MG/1
650 TABLET ORAL EVERY 4 HOURS PRN
Status: DISCONTINUED | OUTPATIENT
Start: 2022-10-05 | End: 2022-10-11 | Stop reason: HOSPADM

## 2022-10-05 RX ADMIN — VANCOMYCIN HYDROCHLORIDE 1750 MG: 500 INJECTION, POWDER, LYOPHILIZED, FOR SOLUTION INTRAVENOUS at 04:10

## 2022-10-05 RX ADMIN — ONDANSETRON 4 MG: 2 INJECTION INTRAMUSCULAR; INTRAVENOUS at 03:10

## 2022-10-05 RX ADMIN — PIPERACILLIN SODIUM AND TAZOBACTAM SODIUM 4.5 G: 4; .5 INJECTION, POWDER, LYOPHILIZED, FOR SOLUTION INTRAVENOUS at 04:10

## 2022-10-05 RX ADMIN — INSULIN HUMAN 6.5 UNITS/HR: 1 INJECTION, SOLUTION INTRAVENOUS at 11:10

## 2022-10-05 RX ADMIN — LACOSAMIDE 100 MG: 50 TABLET, FILM COATED ORAL at 09:10

## 2022-10-05 RX ADMIN — SODIUM CHLORIDE, SODIUM LACTATE, POTASSIUM CHLORIDE, AND CALCIUM CHLORIDE 2580 ML: .6; .31; .03; .02 INJECTION, SOLUTION INTRAVENOUS at 03:10

## 2022-10-05 RX ADMIN — NOREPINEPHRINE BITARTRATE 0.1 MCG/KG/MIN: 4 INJECTION, SOLUTION INTRAVENOUS at 05:10

## 2022-10-05 RX ADMIN — SODIUM CHLORIDE, POTASSIUM CHLORIDE, SODIUM LACTATE AND CALCIUM CHLORIDE: 600; 310; 30; 20 INJECTION, SOLUTION INTRAVENOUS at 08:10

## 2022-10-05 RX ADMIN — INSULIN HUMAN 8 UNITS/HR: 1 INJECTION, SOLUTION INTRAVENOUS at 05:10

## 2022-10-05 RX ADMIN — INSULIN DETEMIR 12 UNITS: 100 INJECTION, SOLUTION SUBCUTANEOUS at 09:10

## 2022-10-05 NOTE — H&P
[facility] Emergency Dept  Critical Care Medicine  History & Physical    Patient Name: Kamar Muñoz  MRN: [id number]  Admission Date: 10/5/2022  Hospital Length of Stay: 0 days  Code Status: DNR  Attending Physician: Jason Mckeon MD   Primary Care Provider: Basim Guerrero MD   Principal Problem: Diabetic ketoacidosis without coma associated with type 2 diabetes mellitus    Subjective:     HPI:  Patient information was obtained from patient, relative(daughter), past medical records, and ER records due to patient's waxing and waning altered mental status.       Mr. Muñoz is a 77 y.o M/ w/ hx of stroke, T2DM(poorly controlled with recurrent DKA), CAD, HLD, paroxysmal Afib, and seizures who presented to the ED via EMS from [facility] with concerns of nausea/vomiting, hypotension, hyperglycemia. Per daughter, he has been depressed and has had very poor oral intake since his wife passed away 4 weeks ago.  He has not been taking care of himself. He was seen by his PCP 3 days ago who adjusted his anitdepressants dose recently. He also had a fall last Friday while he was getting dressed that was described as mild. It was not associated with head trauma, as most of fall was on his elbow.     ED course notable for hyperglycemia, keotacidosis, hypotension requiring vasopressors. Critical Care Medicine was consulted for evaluation.        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    Hyperlipidemia     Hypertension     Kidney stone     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 08/02/2012    STEMI involving right coronary artery 01/09/2022    Type II or unspecified type diabetes mellitus with neurological manifestations, uncontrolled(250.62) 03/08/2013    Urinary tract infection        Past Surgical  History:   Procedure Laterality Date    BACK SURGERY      CATARACT EXTRACTION W/  INTRAOCULAR LENS IMPLANT Right     Per Dr Romero note 2018    COLONOSCOPY N/A 2019    Procedure: COLONOSCOPY Suprep;  Surgeon: Anh Johnson MD;  Location: [facility] ENDO;  Service: Endoscopy;  Laterality: N/A;    ESOPHAGOGASTRODUODENOSCOPY N/A 9/15/2022    Procedure: EGD (ESOPHAGOGASTRODUODENOSCOPY);  Surgeon: Dashawn Evans MD;  Location: [facility] ENDO;  Service: Endoscopy;  Laterality: N/A;    EYE SURGERY      HERNIA REPAIR      LEFT HEART CATHETERIZATION Left 2022    Procedure: CATHETERIZATION, HEART, LEFT;  Surgeon: Will Hurst III, MD;  Location: [facility] CATH LAB/EP;  Service: Cardiology;  Laterality: Left;    renal stones      SHOULDER OPEN ROTATOR CUFF REPAIR         Review of patient's allergies indicates:   Allergen Reactions    Iodine      Other reaction(s): swelling  Other reaction(s): Itching  Other reaction(s): Rash       Family History       Problem Relation (Age of Onset)    Diabetes Father          Tobacco Use    Smoking status: Former     Packs/day: 1.50     Years: 25.00     Pack years: 37.50     Types: Cigarettes     Quit date: 1983     Years since quittin.7    Smokeless tobacco: Never   Substance and Sexual Activity    Alcohol use: No    Drug use: No    Sexual activity: Yes     Partners: Female      Review of Systems   Unable to perform ROS: Other (Patient disoriented. HPI provided by patient and daughter at bedside)   Constitutional:  Positive for activity change, appetite change and fatigue. Negative for chills and fever.   Respiratory:  Negative for shortness of breath.    Cardiovascular:  Negative for chest pain.   Gastrointestinal:  Positive for diarrhea and nausea. Negative for abdominal pain and constipation.   Genitourinary:  Negative for difficulty urinating and dysuria.   Musculoskeletal:  Negative for arthralgias and back pain.   Neurological:  Negative for  dizziness.   Psychiatric/Behavioral:  Positive for confusion. The patient is not nervous/anxious.    Objective:     Vital Signs (Most Recent):  Temp: 97.9 °F (36.6 °C) (10/05/22 1442)  Pulse: 93 (10/05/22 1802)  Resp: 20 (10/05/22 1802)  BP: (!) 112/54 (10/05/22 1802)  SpO2: 96 % (10/05/22 1802)   Vital Signs (24h Range):  Temp:  [97.9 °F (36.6 °C)] 97.9 °F (36.6 °C)  Pulse:  [] 93  Resp:  [16-34] 20  SpO2:  [92 %-96 %] 96 %  BP: ()/(35-56) 112/54   Weight: 86.2 kg (190 lb)  Body mass index is 24.39 kg/m².    No intake or output data in the 24 hours ending 10/05/22 1814    Physical Exam  Vitals and nursing note reviewed.   Constitutional:       General: He is not in acute distress.     Appearance: He is normal weight. He is ill-appearing. He is not toxic-appearing or diaphoretic.   HENT:      Head: Normocephalic and atraumatic.      Comments: Lesion on scalp consistent with actinic keratosis.        Mouth/Throat:      Mouth: Mucous membranes are dry.      Pharynx: Oropharynx is clear. No oropharyngeal exudate.   Eyes:      General: No scleral icterus.        Right eye: No discharge.         Left eye: No discharge.      Conjunctiva/sclera: Conjunctivae normal.      Pupils: Pupils are equal, round, and reactive to light.   Cardiovascular:      Rate and Rhythm: Normal rate and regular rhythm.      Heart sounds: Normal heart sounds.   Pulmonary:      Effort: Pulmonary effort is normal. No respiratory distress.   Abdominal:      General: Abdomen is flat. Bowel sounds are normal. There is no distension.      Palpations: Abdomen is soft.      Tenderness: There is no abdominal tenderness. There is no guarding.   Musculoskeletal:         General: No swelling.      Cervical back: No rigidity.      Right lower leg: No edema.      Left lower leg: No edema.   Skin:     General: Skin is warm and dry.      Coloration: Skin is not jaundiced.   Neurological:      Mental Status: He is lethargic and disoriented.       "Motor: Weakness present.       Vents:     Lines/Drains/Airways       Drain  Duration             Male External Urinary Catheter 10/05/22 1532 Medium <1 day              Peripheral Intravenous Line  Duration                  Peripheral IV - Single Lumen 10/05/22 1501 20 G Left Forearm <1 day         Peripheral IV - Single Lumen 10/05/22 1736 18 G Left Antecubital <1 day         Peripheral IV - Single Lumen 10/05/22 1737 18 G Posterior;Right Forearm <1 day                  Significant Labs:    CBC/Anemia Profile:  Recent Labs   Lab 10/05/22  1505   WBC 12.61   HGB 13.4*   HCT 43.6      MCV 98   RDW 11.8        Chemistries:  Recent Labs   Lab 10/05/22  1505   *   K 6.3*   CL 88*   CO2 9*   BUN 61*   CREATININE 3.1*   CALCIUM 9.1   ALBUMIN 3.2*   PROT 7.0   BILITOT 0.6   ALKPHOS 148*   ALT 35   AST 32         ABGs:   Recent Labs   Lab 10/05/22  1631   PH 7.019*   PCO2 38.6   HCO3 9.9*   POCSATURATED 28*   BE -21     Significant Imaging: I have reviewed all pertinent imaging results/findings within the past 24 hours.    Assessment/Plan:     Neuro  History of partial seizures  Hx of focal seizures    PLAN  -Continue home Lacosamide  -Followup Lacosamide levels    Encephalopathy, metabolic  Patient has acute metabolic encephalopathy that is likely secondary to DKA, dehydration.  Treatment for underlying cause is underway.     PLAN  -DKA treatment  -Followup CT head  -Followup Blood cultures, urinalysis. Deescalate antibiotics as necessitated  -Followup Lacosamide levels  -Will monitor neuro checks carefully, avoid narcotics and benzos that will exacerbate agitation, and use PRN anti-psychotics for controls of behavior for self harm.        Psychiatric  Adjustment disorder with disturbance of conduct  Recently worsening depression due to loss of spouse 4 weeks ago.     PLAN  Continue home Sertraline    Cardiac/Vascular  History of ST elevation myocardial infarction (STEMI)  See "Coronary artery disease " "involving native coronary artery of native heart with unstable angina pectoris" A&P    Paroxysmal atrial fibrillation  EKG on admit sinus rhythm    PLAN  Continue home amiodarone, held home metoprolol due to hypotension requiring vasopressors    Coronary artery disease involving native coronary artery of native heart with unstable angina pectoris  -Hx of CAD s/p placement of 2 LAUREN in RCA in 01/09/2022.   -Patient denied chest pain on admission, troponin WNL  -EKG on admission(10/05) Sinus rhythm with 1st degree A-V block  -Per chart review, "ASA discontinued 4/13 due to bleeding risk with triple therapy; continuing Plavix and Eliquis" "No need for triple therapy (ASA/Plavix/eliquis) beyond 1 month"     PLAN  Continue home statin,   Resume home apixaban, clopidogrel after CT head rule out of trauma/bleed  Metoprolol and Losartan held due to hypotension requiring vasopressors    Essential hypertension  Home medications: Metoprolol, losartan    PLAN  Hold home meds secondary to hypotension requiring vasopressors    Renal/  Lactic acidosis  Lactic acidosis on admission likely secondary to dehydration from DKA    Recent Labs     10/05/22  1505   LACTATE 10.6*       PLAN  Trend lactate    BRENDAN (acute kidney injury)  Creatinine 3.1 on admit, baseline around 1.1  Possible Etiologies:  - Likely pre-renal from dehydration and volume losses secondary to DKA    Recent Labs     10/05/22  1505   CREATININE 3.1*   BUN 61*     Estimated Creatinine Clearance: 22.5 mL/min (A) (based on SCr of 3.1 mg/dL (H)).    Plan:   -Fluid resuscitation  -Monitor urine output and serial BMP and adjust therapy as needed.   - Strict I&Os and daily weights   - Avoid nephrotoxic agents such as NSAIDs, gadolinium and IV radiocontrast.  - Renally dose meds to current GFR.  - Maintain MAP > 65.    Hematology  Chronic anticoagulation  Continue home apixaban after CT head rule out of trauma/bleed    Endocrine  * Diabetic ketoacidosis without coma " "associated with type 2 diabetes mellitus  -DKA in the setting of recently decreased oral intake secondary to depression after death of wife 4 weeks ago     Last A1c:   Lab Results   Component Value Date    HGBA1C 10.6 (H) 10/05/2022        Home regimen:     -Per chart review:  -insulin aspart U-100 (NOVOLOG) 100 unit/mL (3 mL) InPn pen   ---Inject 4 Units into the skin with snacks (>200).   -insulin aspart U-100 (NOVOLOG) 100 unit/mL (3 mL) InPn pen   ---Inject 8-16 Units into the skin 3 (three) times daily.   -insulin glargine,hum.rec.anlog (LANTUS SOLOSTAR U-100 INSULIN SUBQ)  ---Inject 12 Units into the skin 2 (two) times a day    PLAN  -Rule out infectious source as precipitating factors. Continue Vancomycin, Zosyn. Followup cultures, urinalysis. Deescalate antibiotics as necessitated. Wean vasopressors as tolerated.  - Insulin drip  - Will monitor glucose results and adjust insulin regimen accordingly  - Fluids: Lactated Ringers  - Diet: Bariatric clear (no sugar)  - Consider bicarbonate drip (if pH <6.9 or hyperkalemic with EKG changes)  - BMP q4hr  - POCT glucose q1hr    Type 2 diabetes mellitus with hyperglycemia, with long-term current use of insulin  See "Diabetic ketoacidosis without coma associated with type 2 diabetes mellitus" A&P    Palliative Care  Goals of care, counseling/discussion  Advance Care Planning     Code Status  In light of the patients advanced age, during admission I engaged the the family (Daughter) in a conversation about the patient's preferences for care  at the very end of life. The patient wishes to have a natural, peaceful death.  Along those lines, the patient does not wish to have CPR or other invasive treatments performed when his heart and/or breathing stops. I communicated to the family that a DNR order would be placed in his medical record to reflect this preference.  I spent a total of 5 minutes engaging the patient in this advance care planning discussion.   "       Other  Debility  Consult PT/OT after improvement in patient AMS      Critical Care Daily Checklist:    A: Awake: RASS Goal/Actual Goal:    Actual:     B: Spontaneous Breathing Trial Performed?     C: SAT & SBT Coordinated?  N/A                      D: Delirium: CAM-ICU     E: Early Mobility Performed? Not yet   F: Feeding Goal:    Status:     Current Diet Order   Procedures    Diet NPO      AS: Analgesia/Sedation N/A   T: Thromboembolic Prophylaxis Resume Eliquis and Clopidogrel after CT head bleed/fracture rule out   H: HOB > 300 Yes   U: Stress Ulcer Prophylaxis (if needed) Pantoprazole   G: Glucose Control Insulin drip   B: Bowel Function     I: Indwelling Catheter (Lines & Mccord) Necessity PIV   D: De-escalation of Antimicrobials/Pharmacotherapies None    Plan for the day/ETD DKA treatment    Code Status:  Family/Goals of Care: DNR         Critical secondary to Patient has a condition that poses threat to life and bodily function: DKA and hypotension requiring vasopressors     Critical care was time spent personally by me on the following activities: development of treatment plan with patient or surrogate and bedside caregivers, discussions with consultants, evaluation of patient's response to treatment, examination of patient, ordering and performing treatments and interventions, ordering and review of laboratory studies, ordering and review of radiographic studies, pulse oximetry, re-evaluation of patient's condition. This critical care time did not overlap with that of any other provider or involve time for any procedures.     Earnest Hill,   Critical Care Medicine  [facility] - Emergency Dept

## 2022-10-05 NOTE — ASSESSMENT & PLAN NOTE
Recently worsening depression due to loss of spouse 4 weeks ago.     PLAN  Continue home Sertraline

## 2022-10-05 NOTE — ASSESSMENT & PLAN NOTE
-DKA in the setting of recently decreased oral intake secondary to depression after death of wife 4 weeks ago     Last A1c:   Lab Results   Component Value Date    HGBA1C 10.6 (H) 10/05/2022        Home regimen:     -Per chart review:  -insulin aspart U-100 (NOVOLOG) 100 unit/mL (3 mL) InPn pen   ---Inject 4 Units into the skin with snacks (>200).   -insulin aspart U-100 (NOVOLOG) 100 unit/mL (3 mL) InPn pen   ---Inject 8-16 Units into the skin 3 (three) times daily.   -insulin glargine,hum.rec.anlog (LANTUS SOLOSTAR U-100 INSULIN SUBQ)  ---Inject 12 Units into the skin 2 (two) times a day    PLAN  -Rule out infectious source as precipitating factors. Continue Vancomycin, Zosyn. Followup cultures, urinalysis. Deescalate antibiotics as necessitated. Wean vasopressors as tolerated.  - Insulin drip  - Will monitor glucose results and adjust insulin regimen accordingly  - Fluids: Lactated Ringers  - Diet: Bariatric clear (no sugar)  - Consider bicarbonate drip (if pH <6.9 or hyperkalemic with EKG changes)  - BMP q4hr  - POCT glucose q1hr

## 2022-10-05 NOTE — ED NOTES
Appearance:  Pt awake, alert & oriented to person, place & time.  Pt in no acute distress at present time.  Skin:  Skin warm, dry & intact.  Mucous membranes moist.  Skin turgor normal.  Respiratory:  Respirations even, non-labored.    Neurologic:  Pt moving all extremities without difficulty.  Sensation intact.   Pt oriented but having confusing thoughts   Peripheral Vascular:  All peripheral pulses present.  Abdomen:  Abdomen soft, non-tender to palpation.

## 2022-10-05 NOTE — CONSULTS
Pt will be admitted to critical care medicine. Full H&P to follow.     Richelle Pandey [facility]-  Critical Care Medicine  10/05/2022 6:53 PM

## 2022-10-05 NOTE — SUBJECTIVE & OBJECTIVE
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 2012    STEMI involving right coronary artery 2022    Type II or unspecified type diabetes mellitus with neurological manifestations, uncontrolled(250.62) 2013    Urinary tract infection        Past Surgical History:   Procedure Laterality Date    BACK SURGERY      CATARACT EXTRACTION W/  INTRAOCULAR LENS IMPLANT Right     Per Dr Romero note 2018    COLONOSCOPY N/A 2019    Procedure: COLONOSCOPY Suprep;  Surgeon: Anh Johnson MD;  Location: [facility] ENDO;  Service: Endoscopy;  Laterality: N/A;    ESOPHAGOGASTRODUODENOSCOPY N/A 9/15/2022    Procedure: EGD (ESOPHAGOGASTRODUODENOSCOPY);  Surgeon: Dashawn Evans MD;  Location: [facility] ENDO;  Service: Endoscopy;  Laterality: N/A;    EYE SURGERY      HERNIA REPAIR      LEFT HEART CATHETERIZATION Left 2022    Procedure: CATHETERIZATION, HEART, LEFT;  Surgeon: Will Hurst III, MD;  Location: [facility] CATH LAB/EP;  Service: Cardiology;  Laterality: Left;    renal stones      SHOULDER OPEN ROTATOR CUFF REPAIR         Review of patient's allergies indicates:   Allergen Reactions    Iodine      Other reaction(s): swelling  Other reaction(s): Itching  Other reaction(s): Rash       Family History       Problem Relation (Age of Onset)    Diabetes Father          Tobacco Use    Smoking status: Former     Packs/day: 1.50     Years: 25.00     Pack years: 37.50     Types: Cigarettes     Quit date: 1983     Years since quittin.7    Smokeless tobacco: Never   Substance and Sexual Activity    Alcohol use: No    Drug use: No    Sexual activity: Yes     Partners: Female      Review of Systems   Unable to perform ROS: Other  (Patient disoriented. HPI provided by patient and daughter at bedside)   Constitutional:  Positive for activity change, appetite change and fatigue. Negative for chills and fever.   Respiratory:  Negative for shortness of breath.    Cardiovascular:  Negative for chest pain.   Gastrointestinal:  Positive for diarrhea and nausea. Negative for abdominal pain and constipation.   Genitourinary:  Negative for difficulty urinating and dysuria.   Musculoskeletal:  Negative for arthralgias and back pain.   Neurological:  Negative for dizziness.   Psychiatric/Behavioral:  Positive for confusion. The patient is not nervous/anxious.    Objective:     Vital Signs (Most Recent):  Temp: 97.9 °F (36.6 °C) (10/05/22 1442)  Pulse: 93 (10/05/22 1802)  Resp: 20 (10/05/22 1802)  BP: (!) 112/54 (10/05/22 1802)  SpO2: 96 % (10/05/22 1802)   Vital Signs (24h Range):  Temp:  [97.9 °F (36.6 °C)] 97.9 °F (36.6 °C)  Pulse:  [] 93  Resp:  [16-34] 20  SpO2:  [92 %-96 %] 96 %  BP: ()/(35-56) 112/54   Weight: 86.2 kg (190 lb)  Body mass index is 24.39 kg/m².    No intake or output data in the 24 hours ending 10/05/22 1814    Physical Exam  Vitals and nursing note reviewed.   Constitutional:       General: He is not in acute distress.     Appearance: He is normal weight. He is ill-appearing. He is not toxic-appearing or diaphoretic.   HENT:      Head: Normocephalic and atraumatic.      Comments: Lesion on scalp consistent with actinic keratosis.        Mouth/Throat:      Mouth: Mucous membranes are dry.      Pharynx: Oropharynx is clear. No oropharyngeal exudate.   Eyes:      General: No scleral icterus.        Right eye: No discharge.         Left eye: No discharge.      Conjunctiva/sclera: Conjunctivae normal.      Pupils: Pupils are equal, round, and reactive to light.   Cardiovascular:      Rate and Rhythm: Normal rate and regular rhythm.      Heart sounds: Normal heart sounds.   Pulmonary:      Effort: Pulmonary effort is normal.  No respiratory distress.   Abdominal:      General: Abdomen is flat. Bowel sounds are normal. There is no distension.      Palpations: Abdomen is soft.      Tenderness: There is no abdominal tenderness. There is no guarding.   Musculoskeletal:         General: No swelling.      Cervical back: No rigidity.      Right lower leg: No edema.      Left lower leg: No edema.   Skin:     General: Skin is warm and dry.      Coloration: Skin is not jaundiced.   Neurological:      Mental Status: He is lethargic and disoriented.      Motor: Weakness present.       Vents:     Lines/Drains/Airways       Drain  Duration             Male External Urinary Catheter 10/05/22 1532 Medium <1 day              Peripheral Intravenous Line  Duration                  Peripheral IV - Single Lumen 10/05/22 1501 20 G Left Forearm <1 day         Peripheral IV - Single Lumen 10/05/22 1736 18 G Left Antecubital <1 day         Peripheral IV - Single Lumen 10/05/22 1737 18 G Posterior;Right Forearm <1 day                  Significant Labs:    CBC/Anemia Profile:  Recent Labs   Lab 10/05/22  1505   WBC 12.61   HGB 13.4*   HCT 43.6      MCV 98   RDW 11.8        Chemistries:  Recent Labs   Lab 10/05/22  1505   *   K 6.3*   CL 88*   CO2 9*   BUN 61*   CREATININE 3.1*   CALCIUM 9.1   ALBUMIN 3.2*   PROT 7.0   BILITOT 0.6   ALKPHOS 148*   ALT 35   AST 32         ABGs:   Recent Labs   Lab 10/05/22  1631   PH 7.019*   PCO2 38.6   HCO3 9.9*   POCSATURATED 28*   BE -21     Significant Imaging: I have reviewed all pertinent imaging results/findings within the past 24 hours.

## 2022-10-05 NOTE — ED PROVIDER NOTES
Encounter Date: 10/5/2022       History     Chief Complaint   Patient presents with    Emesis     Arrives via EMS with c/o N/V, hypotension, and hyperglycemia      Kamar Muñoz is a 77-year-old male past medical history of CAD diabetes, hypertension, hyperlipidemia, neuropathy presenting today for weakness, hypotension.  Patient states that he has been feeling unwell for the past 3 days.  Has had multiple episodes of vomiting and diarrhea.  Per EMS, they were called because patient was more lethargic and weak appearing than usual and his blood pressure was low.  He currently denies chest pain or abdominal pain.  No shortness of breath.      Additional history provided by daughter who is now at bedside.  States that he has been severely depressed since the loss of his wife 1 month ago.  He has not been eating and seems very withdrawn.      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    Hyperlipidemia     Hypertension     Kidney stone     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 08/02/2012    STEMI involving right coronary artery 01/09/2022    Type II or unspecified type diabetes mellitus with neurological manifestations, uncontrolled(250.62) 03/08/2013    Urinary tract infection      Past Surgical History:   Procedure Laterality Date    BACK SURGERY      CATARACT EXTRACTION W/  INTRAOCULAR LENS IMPLANT Right     Per Dr Romero note 11/2018    COLONOSCOPY N/A 1/28/2019    Procedure: COLONOSCOPY Suprep;  Surgeon: Anh Johnson MD;  Location: [facility];  Service: Endoscopy;  Laterality: N/A;    ESOPHAGOGASTRODUODENOSCOPY N/A 9/15/2022    Procedure: EGD  (ESOPHAGOGASTRODUODENOSCOPY);  Surgeon: Dashawn Evans MD;  Location: [facility] ENDO;  Service: Endoscopy;  Laterality: N/A;    EYE SURGERY      HERNIA REPAIR      LEFT HEART CATHETERIZATION Left 2022    Procedure: CATHETERIZATION, HEART, LEFT;  Surgeon: Will Hurst III, MD;  Location: [facility] CATH LAB/EP;  Service: Cardiology;  Laterality: Left;    renal stones      SHOULDER OPEN ROTATOR CUFF REPAIR       Family History   Problem Relation Age of Onset    Diabetes Father     Prostate cancer Neg Hx     Kidney disease Neg Hx      Social History     Tobacco Use    Smoking status: Former     Packs/day: 1.50     Years: 25.00     Pack years: 37.50     Types: Cigarettes     Quit date: 1983     Years since quittin.7    Smokeless tobacco: Never   Substance Use Topics    Alcohol use: No    Drug use: No     Review of Systems   Constitutional:  Positive for fatigue. Negative for chills and fever.   HENT:  Negative for sore throat.    Respiratory:  Negative for cough and shortness of breath.    Cardiovascular:  Negative for chest pain and palpitations.   Gastrointestinal:  Positive for diarrhea, nausea and vomiting. Negative for abdominal pain.   Genitourinary:  Negative for dysuria.   Musculoskeletal:  Negative for back pain.   Skin:  Negative for rash.   Neurological:  Positive for weakness.   Hematological:  Does not bruise/bleed easily.   Psychiatric/Behavioral:  Negative for confusion.      Physical Exam     Initial Vitals [10/05/22 1442]   BP Pulse Resp Temp SpO2   (S) (!) 75/45 85 16 97.9 °F (36.6 °C) 96 %      MAP       --         Physical Exam    Nursing note and vitals reviewed.  Constitutional: He appears well-developed and well-nourished. He appears distressed.   Elderly male     HENT:   Dry mucous membranes   Eyes: Conjunctivae are normal.   Neck: Neck supple.   Cardiovascular:  Normal rate, regular rhythm and intact distal pulses.           Pulmonary/Chest: Breath sounds normal. He has no  wheezes. He has no rales.   Abdominal: Abdomen is soft. Bowel sounds are normal. He exhibits no distension. There is no abdominal tenderness.   Musculoskeletal:         General: No edema.      Cervical back: Neck supple.     Lymphadenopathy:     He has no cervical adenopathy.   Neurological: He is alert and oriented to person, place, and time. He has normal strength.   Answers questions slowly, lethargic     Skin: No rash noted.   Psychiatric: He has a normal mood and affect.       ED Course   Procedures  Labs Reviewed   CBC W/ AUTO DIFFERENTIAL - Abnormal; Notable for the following components:       Result Value    RBC 4.46 (*)     Hemoglobin 13.4 (*)     MCHC 30.7 (*)     Immature Granulocytes 0.7 (*)     Gran # (ANC) 10.5 (*)     Immature Grans (Abs) 0.09 (*)     Gran % 82.9 (*)     Lymph % 11.2 (*)     All other components within normal limits   COMPREHENSIVE METABOLIC PANEL - Abnormal; Notable for the following components:    Sodium 128 (*)     Potassium 6.3 (*)     Chloride 88 (*)     CO2 9 (*)     Glucose 1,042 (*)     BUN 61 (*)     Creatinine 3.1 (*)     Albumin 3.2 (*)     Alkaline Phosphatase 148 (*)     Anion Gap 31 (*)     eGFR 19.7 (*)     All other components within normal limits    Narrative:     K,CO2,Glu,LA   critical result(s) called and verbal readback obtained   from Olinda Reyes Rn by KIMBERLY 10/05/2022 16:49   LACTIC ACID, PLASMA - Abnormal; Notable for the following components:    Lactate (Lactic Acid) 10.6 (*)     All other components within normal limits    Narrative:     K,CO2,Glu,LA   critical result(s) called and verbal readback obtained   from Olinda Reyes Rn by KIMBRELY 10/05/2022 16:49   PROCALCITONIN - Abnormal; Notable for the following components:    Procalcitonin 0.69 (*)     All other components within normal limits   BETA - HYDROXYBUTYRATE, SERUM - Abnormal; Notable for the following components:    Beta-Hydroxybutyrate 5.9 (*)     All other components within normal limits   BASIC  METABOLIC PANEL - Abnormal; Notable for the following components:    Sodium 127 (*)     Potassium 6.5 (*)     Chloride 88 (*)     CO2 <5 (*)     Glucose 1,110 (*)     BUN 61 (*)     Creatinine 3.1 (*)     Calcium 8.6 (*)     eGFR 19.7 (*)     All other components within normal limits   PHOSPHORUS - Abnormal; Notable for the following components:    Phosphorus 10.0 (*)     All other components within normal limits   HEMOGLOBIN A1C - Abnormal; Notable for the following components:    Hemoglobin A1C 10.6 (*)     Estimated Avg Glucose 258 (*)     All other components within normal limits    Narrative:     With next lab draw   ISTAT PROCEDURE - Abnormal; Notable for the following components:    POC PH 7.019 (*)     POC PO2 27 (*)     POC HCO3 9.9 (*)     POC SATURATED O2 28 (*)     POC TCO2 11 (*)     All other components within normal limits   LIPASE   MAGNESIUM    Narrative:     With next lab draw   TROPONIN I   URINALYSIS, REFLEX TO URINE CULTURE   BETA - HYDROXYBUTYRATE, SERUM   ISTAT CHEM8     EKG Readings: (Independently Interpreted)   EKG:  Sinus rhythm with first-degree AV block at 88, nonspecific intraventricular block.   ECG Results              EKG 12-lead (Final result)  Result time 10/05/22 17:36:08      Final result by Interface, Lab [facility] (10/05/22 17:36:08)                   Narrative:    Test Reason : I95.9,    Vent. Rate : 088 BPM     Atrial Rate : 088 BPM     P-R Int : 238 ms          QRS Dur : 152 ms      QT Int : 428 ms       P-R-T Axes : 081 -04 034 degrees     QTc Int : 517 ms    Sinus rhythm with 1st degree A-V block  Nonspecific intraventricular block  Abnormal ECG  When compared with ECG of 30-JUN-2022 14:07,  Premature ventricular complexes are no longer Present  MA interval has increased  Questionable change in QRS duration  Confirmed by Lencho BARROS MD ([id number]) on 10/5/2022 5:36:01 PM    Referred By: [email or web address]   SELF           Confirmed By:Lencho BARROS MD                                   Imaging Results               CT Abdomen Pelvis  Without Contrast (Final result)  Result time 10/05/22 21:13:33      Final result by Camacho Leon MD (10/05/22 21:13:33)                   Impression:      1. Markedly distended rectum with impacted fecal matter with associated wall thickening and adjacent fat stranding.  Findings suggestive of stercoral proctitis.  No associated bowel obstruction.  2. New L1 vertebral body superior endplate compression deformity and left rib 7 fracture.  3. Additional findings as detailed above.  This report was flagged in Epic as abnormal.    Electronically signed by resident: Lucero Coronado  Date:    10/05/2022  Time:    19:02    Electronically signed by: Camacho Leon MD  Date:    10/05/2022  Time:    21:13               Narrative:    EXAMINATION:  CT ABDOMEN PELVIS WITHOUT CONTRAST    CLINICAL HISTORY:  Abdominal abscess/infection suspected;    TECHNIQUE:  5.0 mm axial CT images of the abdomen and pelvis were obtained via helical, multi detector CT technique.  No intravenous contrast material was administered.    COMPARISON:  CT renal stone study 05/28/2022    CT abdomen pelvis 04/13/2022    CT chest 04/18/2022    FINDINGS:  Study significantly degraded by artifact.    Evaluation for solid organ pathology is limited due to lack of intravenous contrast.    Heart: Postoperative changes of LAD stent.    Lung Bases: Significant respiratory motion.  Advanced centrilobular and paraseptal emphysema and minimal dependent atelectasis.    Liver: Normal in size.  No focal lesions.    Gallbladder: High-density material within the gallbladder, possibly vicarious excretion of contrast.  No evidence for cholecystitis.    Bile Ducts: No intra or extrahepatic biliary ductal dilation.    Pancreas: Appears atrophic.  No pancreatic mass lesion or peripancreatic inflammatory change.    Spleen: Normal.    Adrenals: Normal.    Genitourinary: Normal in size and location.  Bilateral simple renal  cysts.  No nephrolithiasis, or hydroureteronephrosis.    Right perinephric stranding has improved from prior study.  Previously seen right hydroureter and hydronephrosis has essentially resolved.  Stable left perinephric stranding.    Bladder demonstrates smooth contours.  Focal posterior bladder wall thickening likely reactive secondary to mass effect from rectal fecal impaction.    Reproductive organs: Unremarkable.    GI tract/Mesentery: Stomach is normal appearance.  Visualized loops of small bowel are normal in caliber without evidence for inflammation or obstruction.    Rectum is markedly distended by impacted fecal matter to 10 cm in diameter in keeping with fecaloma.  Associated wall thickening with adjacent fat stranding and presacral stranding.  The remaining loops of large bowel appear unremarkable.    Appendix is unremarkable.    Scattered prominent periaortic lymph nodes without pathologic lymphadenopathy.    Trace fluid in the pericolic gutters bilaterally improved from prior study.    Abdominal wall: Unremarkable.    Vasculature: Abdominal aorta is normal in caliber with mild calcific atherosclerosis.    Bones: Diffuse osteopenia. Multilevel degenerative changes visualized spine most severe at L5-S1. L1 vertebral body superior endplate compression deformity which is new from renal stone study 05/28/2022.   Left rib 7 fracture with sclerotic changes which is new from chest CT 04/18/2022.                                       CT Head Without Contrast (Final result)  Result time 10/05/22 21:12:34      Final result by Camacho Leon MD (10/05/22 21:12:34)                   Impression:      No acute infarct or intracranial hemorrhage.    Extensive dural venous sinus pneumocephalus.  Findings are favored to be benign in etiology.  Differential considerations include intravenous induced pneumocephalus or barotrauma (i.e. nose blowing).  Septic thrombosis or craniofacial trauma felt less likely.    Unchanged  high right parietal scalp thickening/induration likely in keeping with reported history of actinic keratosis per primary team.  Correlate for scalp hematoma.    Stable multifocal areas of encephalomalacia likely reflecting sequela of remote infarcts.  Stable chronic microvascular ischemic change and volume loss.    Additional findings in the body of the report.    Electronically signed by resident: Oz Galvez  Date:    10/05/2022  Time:    18:46    Electronically signed by: Camacho Leon MD  Date:    10/05/2022  Time:    21:12               Narrative:    EXAMINATION:  CT HEAD WITHOUT CONTRAST    CLINICAL HISTORY:  Head trauma, minor (Age >= 65y);    TECHNIQUE:  Low dose axial CT images obtained throughout the head without the use of intravenous contrast.  Axial, sagittal and coronal reconstructions were performed.    COMPARISON:  CT head 06/30/2022.    FINDINGS:  Stable moderate generalized cerebral volume loss with compensatory enlargement of the ventricles, sulci, and cisterns.    Stable moderate-large sized right frontotemporal encephalomalacia..  Stable small area of encephalomalacia within the left anterior frontal lobe near the interhemispheric fissure.  Stable small sized area of encephalomalacia within the left cerebellar hemisphere.  Few additional scattered remote lacunar infarcts identified elsewhere as well appears stable.    Advanced patchy hypoattenuation in the supratentorial white matter, nonspecific but most likely reflecting chronic microvascular ischemic changes.    Chronic focal defect within the high left parietal bone near the vertex.  Multiple prominent arachnoid granulations within the inner calvarium, similar to priors.  There is scattered gas in the midline and bilateral dural venous sinuses, most extensively involving the superior sagittal sinus and extending anteriorly.  Foci of gas extend throughout the entire dural venous sinuses with involvement of the bilateral cavernous sinus as  well.    Unchanged right parietal scalp thickening/induration near vertex.  Mastoid air cells and paranasal sinuses are clear.  Scattered atherosclerotic calcification about the skull base.                                       X-Ray Chest AP Portable (Final result)  Result time 10/05/22 15:23:48      Final result by Oz Guerra MD (10/05/22 15:23:48)                   Impression:      No acute findings      Electronically signed by: Oz Guerra MD  Date:    10/05/2022  Time:    15:23               Narrative:    EXAMINATION:  XR CHEST AP PORTABLE    CLINICAL HISTORY:  Sepsis;    TECHNIQUE:  Single frontal view of the chest was performed.    COMPARISON:  05/16/2022    FINDINGS:  Cardiac size is normal and lungs are clear.  No infiltrate is seen.                                    X-Rays:   Independently Interpreted Readings:   Other Readings:  Chest x-ray:  No acute finding  Medications   sodium chloride 0.9% flush 10 mL (has no administration in time range)   sodium chloride 0.9% flush 10 mL (has no administration in time range)   acetaminophen tablet 650 mg (has no administration in time range)   amiodarone tablet 200 mg (200 mg Oral Given 10/6/22 0912)   atorvastatin tablet 40 mg (40 mg Oral Given 10/6/22 0912)   lacosamide tablet 100 mg (100 mg Oral Given 10/6/22 0912)   pantoprazole EC tablet 40 mg (40 mg Oral Given 10/6/22 0912)   sertraline tablet 50 mg (50 mg Oral Given 10/6/22 0912)   dextrose 10% bolus 125 mL (has no administration in time range)   dextrose 10% bolus 250 mL (has no administration in time range)   vancomycin - pharmacy to dose (has no administration in time range)   piperacillin-tazobactam 4.5 g in sodium chloride 0.9% 100 mL IVPB (ready to mix system) ( Intravenous Verify Only 10/6/22 1129)   apixaban tablet 5 mg (5 mg Oral Given 10/6/22 0912)   clopidogreL tablet 75 mg (75 mg Oral Given 10/6/22 0912)   prochlorperazine tablet 5 mg (has no administration in time range)    gabapentin capsule 100 mg (100 mg Oral Given 10/6/22 0912)   glucose chewable tablet 16 g (has no administration in time range)   glucose chewable tablet 24 g (has no administration in time range)   glucagon (human recombinant) injection 1 mg (has no administration in time range)   insulin detemir U-100 pen 15 Units (15 Units Subcutaneous Given 10/6/22 0923)   insulin aspart U-100 pen 1-10 Units (has no administration in time range)   insulin aspart U-100 pen 10 Units (10 Units Subcutaneous Given 10/6/22 1004)   NORepinephrine bitartrate-D5W 4 mg/250 mL (16 mcg/mL) PERIPHERAL access infusion (0 mcg/kg/min × 86.2 kg Intravenous Stopped 10/6/22 1000)   lactated ringers infusion ( Intravenous Verify Only 10/6/22 1129)   vancomycin 500 mg in dextrose 5 % 100 mL IVPB (ready to mix system) (has no administration in time range)   lactated ringers bolus 2,586 mL (0 mLs Intravenous Stopped 10/5/22 1716)   ondansetron injection 4 mg ( Intravenous Canceled Entry 10/5/22 1630)   vancomycin 1.75 g in 5 % dextrose 500 mL IVPB (0 mg Intravenous Stopped 10/5/22 1843)   insulin regular bolus from bag/infusion 8.62 Units (8.62 Units Intravenous Bolus from Bag 10/5/22 1714)     Medical Decision Making:   History:   Old Medical Records: I decided to obtain old medical records.  Initial Assessment:   Emergent evaluation of 77-year-old male presenting today for nausea, vomiting, diarrhea x3 days now with hypotension and lethargy.  Afebrile, hypotensive on arrival  Mentating well, answers questions appropriately  Differential Diagnosis:   Dehydration, acute kidney injury, electrolyte derangement gastroenteritis, colitis, enteritis, possible infection  Independently Interpreted Test(s):   I have ordered and independently interpreted X-rays - see prior notes.  I have ordered and independently interpreted EKG Reading(s) - see prior notes  Clinical Tests:   Lab Tests: Reviewed and Ordered  Radiological Study: Reviewed and Ordered  Medical  Tests: Ordered and Reviewed  ED Management:  - labs  - EKG  - CXR  - IVF  - CT abd/pelvis          Attending Attestation:         Attending Critical Care:   Critical Care Times:   ==============================================================  Total Critical Care Time - exclusive of procedural time: 45 minutes.  ==============================================================  Critical care was necessary to treat or prevent imminent or life-threatening deterioration of the following conditions: sepsis and metabolic crisis.   Critical care was time spent personally by me on the following activities: review of x-rays / CT sent with the patient, ordering lab, x-rays, and/or EKG, ordering and performing treatments and interventions, evaluation of patient's response to treatment, discussion with consultants and re-evaluation of patient's conition.   Critical Care Condition: life-threatening         ED Course as of 10/06/22 1134   Wed Oct 05, 2022   1552 Notified by nurse that patient map is 47.  Presented to bedside, patient is still hypotensive.  2 L of fluids hanging.  He still appears very dry.  Daughter at bedside, updated with i-STAT results.  Concern currently is for HHS, DKA, sepsis driving hyperglycemia.  Will give 2 L of fluids prior to starting insulin drip.  Broad-spectrum antibiotics ordered. [GM]   1613 Chest x-ray:  No acute process [GM]   1639 Glucose(!!): 1,042 [GM]   1639 Lactate, Avni(!!): 10.6 [GM]   1639 Procalcitonin(!): 0.69 [GM]   1639 Notified by respiratory VBG shows pH 7.019/38.6/BE-21/HCO39.9   [GM]   1640 Critical Care consulted. [GM]   1650 Discussed with critical care, will evaluate patient.    Patient's blood pressure is still low despite fluid resuscitation.  Will start on Levophed [GM]   1708 Critical care at bedside [GM]   1804 BP improving on nor epi.  Patient admitted to critical care for further treatment and evaluation.    Initial CT abdomen pelvis with contrast canceled due to  patient's BRENDAN, will do CT without contrast. [GM]      ED Course User Index  [GM] Shania Reece MD        Critical care will follow imaging         Clinical Impression:   Final diagnoses:  [I95.9] Hypotension  [A41.9] Sepsis, due to unspecified organism, unspecified whether acute organ dysfunction present (Primary)  [L85.8] KA (keratoacanthoma)  [E10.10] Diabetic ketoacidosis without coma associated with type 1 diabetes mellitus        ED Disposition Condition    Admit                 Shania Reece MD  10/06/22 1139     [place]

## 2022-10-05 NOTE — ASSESSMENT & PLAN NOTE
Patient has acute metabolic encephalopathy that is likely secondary to DKA, dehydration.  Treatment for underlying cause is underway.     PLAN  -DKA treatment  -Followup CT head  -Followup Blood cultures, urinalysis. Deescalate antibiotics as necessitated  -Followup Lacosamide levels  -Will monitor neuro checks carefully, avoid narcotics and benzos that will exacerbate agitation, and use PRN anti-psychotics for controls of behavior for self harm.

## 2022-10-05 NOTE — ASSESSMENT & PLAN NOTE
EKG on admit sinus rhythm    PLAN  Continue home amiodarone, held home metoprolol due to hypotension requiring vasopressors

## 2022-10-05 NOTE — ASSESSMENT & PLAN NOTE
Creatinine 3.1 on admit, baseline around 1.1  Possible Etiologies:  - Likely pre-renal from dehydration and volume losses secondary to DKA    Recent Labs     10/05/22  1505   CREATININE 3.1*   BUN 61*     Estimated Creatinine Clearance: 22.5 mL/min (A) (based on SCr of 3.1 mg/dL (H)).    Plan:   -Fluid resuscitation  -Monitor urine output and serial BMP and adjust therapy as needed.   - Strict I&Os and daily weights   - Avoid nephrotoxic agents such as NSAIDs, gadolinium and IV radiocontrast.  - Renally dose meds to current GFR.  - Maintain MAP > 65.

## 2022-10-05 NOTE — PROGRESS NOTES
"Patient ID: Kamar Muñoz is a 77 y.o. male.    Chief Complaint: No chief complaint on file.      HPI:  HPI  77M referred here for possible sludge in gallbladder on US. Reports some vague lower abdominal pain, mostly at night. Denies post prandial pain, nausea. Has some constipation, has BM every other day or so. Started on new medication? Colace? Improving.   Hx of CAD, afib, recent heart cath with LAUREN.   On plavix and eliquis    Review of Systems   Constitutional:  Negative for chills, diaphoresis and fever.   HENT:  Negative for trouble swallowing.    Respiratory:  Negative for cough, shortness of breath, wheezing and stridor.    Cardiovascular:  Negative for chest pain and palpitations.   Gastrointestinal:  Negative for abdominal distention, abdominal pain, blood in stool, diarrhea, nausea and vomiting.   Endocrine: Negative for cold intolerance and heat intolerance.   Genitourinary:  Negative for difficulty urinating.   Musculoskeletal:  Positive for gait problem. Negative for back pain.   Skin:  Negative for rash.   Allergic/Immunologic: Negative for immunocompromised state.   Neurological:  Negative for dizziness, syncope and numbness.   Hematological:  Negative for adenopathy.   Psychiatric/Behavioral:  Negative for agitation.      Current Outpatient Medications   Medication Sig Dispense Refill    apixaban (ELIQUIS) 5 mg Tab Take 1 tablet (5 mg total) by mouth 2 (two) times daily. 60 tablet 5    atorvastatin (LIPITOR) 40 MG tablet Take 1 tablet (40 mg total) by mouth once daily. 90 tablet 3    BD ULTRA-FINE SHORT PEN NEEDLE 31 gauge x 5/16" Ndle 3 (three) times daily.      blood sugar diagnostic Strp 1 strip by Misc.(Non-Drug; Combo Route) route 2 (two) times a day. 200 strip 6    cetirizine (ZYRTEC) 10 MG tablet Take 1 tablet (10 mg total) by mouth every evening.  0    clopidogreL (PLAVIX) 75 mg tablet Take 75 mg by mouth once daily.      diclofenac sodium (VOLTAREN) 1 % Gel Apply 4 g topically 3 " "(three) times daily. Apply to sacrun closed skin/buttocks      docusate sodium (COLACE) 100 MG capsule Take 100 mg by mouth once daily at 6am.      glucose 4 GM chewable tablet Take 4 tablets (16 g total) by mouth as needed for Low blood sugar (50 - 70).  12    glucose 4 GM chewable tablet Take 6 tablets (24 g total) by mouth as needed for Low blood sugar (< 50).  12    insulin aspart U-100 (NOVOLOG) 100 unit/mL (3 mL) InPn pen Inject 4 Units into the skin with snacks (>200).  0    insulin aspart U-100 (NOVOLOG) 100 unit/mL (3 mL) InPn pen Inject 8-16 Units into the skin 3 (three) times daily. 43.2 mL 3    insulin glargine,hum.rec.anlog (LANTUS SOLOSTAR U-100 INSULIN SUBQ) Inject 12 Units into the skin 2 (two) times a day.      lacosamide (VIMPAT) 100 mg Tab Take 1 tablet (100 mg total) by mouth every 12 (twelve) hours. 60 tablet 11    lancets (ONETOUCH ULTRASOFT LANCETS) Misc 1 lancet by Misc.(Non-Drug; Combo Route) route 2 (two) times a day. 200 each 6    losartan (COZAAR) 25 MG tablet Take 1 tablet (25 mg total) by mouth once daily. 90 tablet 3    magnesium oxide (MAG-OX) 400 mg (241.3 mg magnesium) tablet Take 1 tablet (400 mg total) by mouth 2 (two) times daily.  0    metoprolol succinate (TOPROL-XL) 50 MG 24 hr tablet Take 1 tablet (50 mg total) by mouth once daily. 30 tablet 3    MULTIVITAMIN ORAL Take 1 tablet by mouth once daily.       nitroGLYCERIN (NITROSTAT) 0.4 MG SL tablet Place 1 tablet (0.4 mg total) under the tongue every 5 (five) minutes as needed for Chest pain. 25 tablet 11    ondansetron (ZOFRAN-ODT) 4 MG TbDL Take 4 mg by mouth 3 (three) times daily with meals.      pantoprazole (PROTONIX) 40 MG tablet Take 1 tablet (40 mg total) by mouth once daily. 30 tablet 11    pen needle, diabetic (PEN NEEDLE) 30 gauge x 5/16" Ndle 1 Units by Misc.(Non-Drug; Combo Route) route 3 (three) times daily. 100 each 3    polycarbophil (FIBERCON) 625 mg tablet Take 1 tablet by mouth once daily.       psyllium " husk, with sugar, (METAMUCIL, WITH SUGAR,) 3.4 gram packet Take 1 packet by mouth 2 (two) times daily.      sertraline (ZOLOFT) 50 MG tablet Take 50 mg by mouth once daily.      sucralfate (CARAFATE) 1 gram tablet Take 1 g by mouth 3 (three) times daily before meals.      amiodarone (PACERONE) 200 MG Tab Take 1 tablet (200 mg total) by mouth once daily. (Patient not taking: No sig reported) 90 tablet 3    ergocalciferol (ERGOCALCIFEROL) 50,000 unit Cap Take 1 capsule (50,000 Units total) by mouth every 7 days. (Patient not taking: No sig reported) 12 capsule 3    tamsulosin (FLOMAX) 0.4 mg Cap Take 1 capsule (0.4 mg total) by mouth every evening. for 14 days 14 capsule 0     No current facility-administered medications for this visit.       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    Hyperlipidemia     Hypertension     Kidney stone     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 08/02/2012    STEMI involving right coronary artery 01/09/2022    Type II or unspecified type diabetes mellitus with neurological manifestations, uncontrolled(250.62) 03/08/2013    Urinary tract infection        Past Surgical History:   Procedure Laterality Date    BACK SURGERY      CATARACT EXTRACTION W/  INTRAOCULAR LENS IMPLANT Right     Per Dr Romero note 11/2018    COLONOSCOPY N/A 1/28/2019    Procedure: COLONOSCOPY Suprep;  Surgeon: Anh Johnson MD;  Location: [facility];  Service: Endoscopy;  Laterality: N/A;    ESOPHAGOGASTRODUODENOSCOPY N/A 9/15/2022    Procedure: EGD (ESOPHAGOGASTRODUODENOSCOPY);  Surgeon: Dashawn Evans MD;  Location: [facility];  Service: Endoscopy;  Laterality:  N/A;    EYE SURGERY      HERNIA REPAIR      LEFT HEART CATHETERIZATION Left 2022    Procedure: CATHETERIZATION, HEART, LEFT;  Surgeon: Will Hurst III, MD;  Location: [facility] CATH LAB/EP;  Service: Cardiology;  Laterality: Left;    renal stones      SHOULDER OPEN ROTATOR CUFF REPAIR         Family History   Problem Relation Age of Onset    Diabetes Father     Prostate cancer Neg Hx     Kidney disease Neg Hx        Social History     Socioeconomic History    Marital status:    Tobacco Use    Smoking status: Former     Packs/day: 1.50     Years: 25.00     Pack years: 37.50     Types: Cigarettes     Quit date: 1983     Years since quittin.7    Smokeless tobacco: Never   Substance and Sexual Activity    Alcohol use: No    Drug use: No    Sexual activity: Yes     Partners: Female   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     Social Determinants of Health     Financial Resource Strain: Low Risk     Difficulty of Paying Living Expenses: Not hard at all   Food Insecurity: No Food Insecurity    Worried About Running Out of Food in the Last Year: Never true    Ran Out of Food in the Last Year: Never true   Transportation Needs: No Transportation Needs    Lack of Transportation (Medical): No    Lack of Transportation (Non-Medical): No   Housing Stability: Low Risk     Unable to Pay for Housing in the Last Year: No    Number of Places Lived in the Last Year: 1    Unstable Housing in the Last Year: No       There were no vitals filed for this visit.    Physical Exam  Constitutional:       General: He is not in acute distress.  HENT:      Head: Normocephalic and atraumatic.   Eyes:      General: No scleral icterus.  Cardiovascular:      Rate and Rhythm: Normal rate.   Pulmonary:      Effort: Pulmonary effort is normal. No respiratory distress.      Breath sounds: No stridor.   Abdominal:      Palpations: Abdomen is soft.      Tenderness: There is no  abdominal tenderness.   Lymphadenopathy:      Cervical: No cervical adenopathy.   Skin:     General: Skin is warm.      Findings: No erythema.   Neurological:      Mental Status: He is alert and oriented to person, place, and time.   Psychiatric:         Behavior: Behavior normal.   Body mass index is 21.12 kg/m².  US possible gb sludge  Tbili normal in may      Assessment & Plan:   77M with questionable gallbladder sludge  History not consistent with biliary colic plus high risk for surgery given recent MI, stent placement  No plans for cholecystectomy

## 2022-10-05 NOTE — ASSESSMENT & PLAN NOTE
"See "Coronary artery disease involving native coronary artery of native heart with unstable angina pectoris" A&P  "

## 2022-10-05 NOTE — HPI
Patient information was obtained from patient, relative(daughter), past medical records, and ER records due to patient's waxing and waning altered mental status.       Mr. Muñoz is a 77 y.o M/ w/ hx of stroke, T2DM(poorly controlled with recurrent DKA), CAD, HLD, paroxysmal Afib, and seizures who presented to the ED via EMS from [facility] with concerns of nausea/vomiting, hypotension, hyperglycemia. Per daughter, he has been depressed and has had very poor oral intake since his wife passed away 4 weeks ago.  He has not been taking care of himself. He was seen by his PCP 3 days ago who adjusted his anitdepressants dose recently. He also had a fall last Friday while he was getting dressed that was described as mild. It was not associated with head trauma, as most of fall was on his elbow.     ED course notable for hyperglycemia, keotacidosis, hypotension requiring vasopressors. Critical Care Medicine was consulted for evaluation.

## 2022-10-05 NOTE — ED NOTES
Patient identifiers verified and correct for Floydherberth Toni  LOC: The patient is awake and oriented x1.   APPEARANCE: Patient appears comfortable and in no acute distress, patient is clean and well groomed.  SKIN: The skin is warm and dry, color consistent with ethnicity, patient has normal skin turgor and moist mucus membranes.  MUSCULOSKELETAL: Patient moving all extremities spontaneously, no swelling noted.  RESPIRATORY: Airway is open and patent, respirations are spontaneous, patient has a normal effort and rate, no accessory muscle use noted, pt placed on continuous pulse ox with O2 sats noted at 96% on room air.  CARDIAC: Pt placed on cardiac monitor. Patient has a normal rate, no edema noted, capillary refill < 3 seconds.   GASTRO: Soft and non tender to palpation, no distention noted, normoactive bowel sounds present in all four quadrants.   NEURO: Pt opens eyes spontaneously, behavior appropriate to situation, follows commands, facial expression symmetrical, bilateral hand grasp equal and even, purposeful motor response noted, normal sensation in all extremities when touched with a finger.

## 2022-10-05 NOTE — ASSESSMENT & PLAN NOTE
Home medications: Metoprolol, losartan    PLAN  Hold home meds secondary to hypotension requiring vasopressors

## 2022-10-05 NOTE — ASSESSMENT & PLAN NOTE
"-Hx of CAD s/p placement of 2 LAUREN in RCA in 01/09/2022.   -Patient denied chest pain on admission, troponin WNL  -EKG on admission(10/05) Sinus rhythm with 1st degree A-V block  -Per chart review, "ASA discontinued 4/13 due to bleeding risk with triple therapy; continuing Plavix and Eliquis" "No need for triple therapy (ASA/Plavix/eliquis) beyond 1 month"     PLAN  Continue home statin,   Resume home apixaban, clopidogrel after CT head rule out of trauma/bleed  Metoprolol and Losartan held due to hypotension requiring vasopressors  "

## 2022-10-05 NOTE — ED NOTES
I-STAT Chem-8+ Results:   Value Reference Range   Sodium  129 136-145 mmol/L   Potassium  5.9 3.5-5.1 mmol/L   Chloride 98  mmol/L   Ionized Calcium 1.09 1.06-1.42 mmol/L   CO2 (measured) 13 23-29 mmol/L   Glucose >700  mg/dL   BUN 56 6-30 mg/dL   Creatinine 2.3 0.5-1.4 mg/dL   Hematocrit 41 36-54%

## 2022-10-05 NOTE — ASSESSMENT & PLAN NOTE
Lactic acidosis on admission likely secondary to dehydration from DKA    Recent Labs     10/05/22  1505   LACTATE 10.6*       PLAN  Trend lactate

## 2022-10-05 NOTE — ED TRIAGE NOTES
Pt from [facility] for vomiting and hypotension.  Unknown onset of vomiting-pt states couple days, EMS reports today.  Pt was given Zofran by NH but states he vomited medication up.  Pt denies pain.

## 2022-10-06 PROBLEM — G93.89 PNEUMOCEPHALUS: Status: ACTIVE | Noted: 2022-10-06

## 2022-10-06 PROBLEM — R78.81 POSITIVE BLOOD CULTURES: Status: ACTIVE | Noted: 2022-10-06

## 2022-10-06 PROBLEM — K92.1 BLOOD IN STOOL: Status: ACTIVE | Noted: 2022-10-06

## 2022-10-06 LAB
ANION GAP SERPL CALC-SCNC: 10 MMOL/L (ref 8–16)
ANION GAP SERPL CALC-SCNC: 15 MMOL/L (ref 8–16)
ANION GAP SERPL CALC-SCNC: 21 MMOL/L (ref 8–16)
B-OH-BUTYR BLD STRIP-SCNC: 0.5 MMOL/L (ref 0–0.5)
BACTERIA #/AREA URNS AUTO: ABNORMAL /HPF
BACTERIA #/AREA URNS AUTO: ABNORMAL /HPF
BASOPHILS # BLD AUTO: 0.05 K/UL (ref 0–0.2)
BASOPHILS NFR BLD: 0.3 % (ref 0–1.9)
BILIRUB UR QL STRIP: NEGATIVE
BILIRUB UR QL STRIP: NEGATIVE
BUN SERPL-MCNC: 47 MG/DL (ref 8–23)
BUN SERPL-MCNC: 57 MG/DL (ref 8–23)
BUN SERPL-MCNC: 58 MG/DL (ref 8–23)
CALCIUM SERPL-MCNC: 8.3 MG/DL (ref 8.7–10.5)
CALCIUM SERPL-MCNC: 8.6 MG/DL (ref 8.7–10.5)
CALCIUM SERPL-MCNC: 9 MG/DL (ref 8.7–10.5)
CHLORIDE SERPL-SCNC: 100 MMOL/L (ref 95–110)
CHLORIDE SERPL-SCNC: 106 MMOL/L (ref 95–110)
CHLORIDE SERPL-SCNC: 96 MMOL/L (ref 95–110)
CLARITY UR REFRACT.AUTO: ABNORMAL
CLARITY UR REFRACT.AUTO: ABNORMAL
CO2 SERPL-SCNC: 14 MMOL/L (ref 23–29)
CO2 SERPL-SCNC: 21 MMOL/L (ref 23–29)
CO2 SERPL-SCNC: 24 MMOL/L (ref 23–29)
COLOR UR AUTO: ABNORMAL
COLOR UR AUTO: YELLOW
CREAT SERPL-MCNC: 2 MG/DL (ref 0.5–1.4)
CREAT SERPL-MCNC: 2.8 MG/DL (ref 0.5–1.4)
CREAT SERPL-MCNC: 2.8 MG/DL (ref 0.5–1.4)
DIFFERENTIAL METHOD: ABNORMAL
EOSINOPHIL # BLD AUTO: 0 K/UL (ref 0–0.5)
EOSINOPHIL NFR BLD: 0.1 % (ref 0–8)
ERYTHROCYTE [DISTWIDTH] IN BLOOD BY AUTOMATED COUNT: 11.4 % (ref 11.5–14.5)
EST. GFR  (NO RACE VARIABLE): 22.3 ML/MIN/1.73 M^2
EST. GFR  (NO RACE VARIABLE): 22.3 ML/MIN/1.73 M^2
EST. GFR  (NO RACE VARIABLE): 33.3 ML/MIN/1.73 M^2
GLUCOSE SERPL-MCNC: 144 MG/DL (ref 70–110)
GLUCOSE SERPL-MCNC: 476 MG/DL (ref 70–110)
GLUCOSE SERPL-MCNC: 598 MG/DL (ref 70–110)
GLUCOSE SERPL-MCNC: 683 MG/DL (ref 70–110)
GLUCOSE UR QL STRIP: ABNORMAL
GLUCOSE UR QL STRIP: NEGATIVE
HCT VFR BLD AUTO: 35.2 % (ref 40–54)
HGB BLD-MCNC: 11.7 G/DL (ref 14–18)
HGB UR QL STRIP: ABNORMAL
HGB UR QL STRIP: ABNORMAL
HYALINE CASTS UR QL AUTO: 0 /LPF
HYALINE CASTS UR QL AUTO: 0 /LPF
IMM GRANULOCYTES # BLD AUTO: 0.19 K/UL (ref 0–0.04)
IMM GRANULOCYTES NFR BLD AUTO: 1.2 % (ref 0–0.5)
KETONES UR QL STRIP: ABNORMAL
KETONES UR QL STRIP: ABNORMAL
LACTATE SERPL-SCNC: 1.9 MMOL/L (ref 0.5–2.2)
LACTATE SERPL-SCNC: 3 MMOL/L (ref 0.5–2.2)
LACTATE SERPL-SCNC: 5.6 MMOL/L (ref 0.5–2.2)
LACTATE SERPL-SCNC: 5.6 MMOL/L (ref 0.5–2.2)
LEUKOCYTE ESTERASE UR QL STRIP: ABNORMAL
LEUKOCYTE ESTERASE UR QL STRIP: ABNORMAL
LYMPHOCYTES # BLD AUTO: 1.3 K/UL (ref 1–4.8)
LYMPHOCYTES NFR BLD: 7.7 % (ref 18–48)
MAGNESIUM SERPL-MCNC: 2 MG/DL (ref 1.6–2.6)
MCH RBC QN AUTO: 29.9 PG (ref 27–31)
MCHC RBC AUTO-ENTMCNC: 33.2 G/DL (ref 32–36)
MCV RBC AUTO: 90 FL (ref 82–98)
MICROSCOPIC COMMENT: ABNORMAL
MICROSCOPIC COMMENT: ABNORMAL
MONOCYTES # BLD AUTO: 1.9 K/UL (ref 0.3–1)
MONOCYTES NFR BLD: 11.7 % (ref 4–15)
NEUTROPHILS # BLD AUTO: 13 K/UL (ref 1.8–7.7)
NEUTROPHILS NFR BLD: 79 % (ref 38–73)
NITRITE UR QL STRIP: NEGATIVE
NITRITE UR QL STRIP: NEGATIVE
NRBC BLD-RTO: 0 /100 WBC
PH UR STRIP: 5 [PH] (ref 5–8)
PH UR STRIP: 6 [PH] (ref 5–8)
PHOSPHATE SERPL-MCNC: 3 MG/DL (ref 2.7–4.5)
PHOSPHATE SERPL-MCNC: 3.5 MG/DL (ref 2.7–4.5)
PHOSPHATE SERPL-MCNC: 4.4 MG/DL (ref 2.7–4.5)
PLATELET # BLD AUTO: 186 K/UL (ref 150–450)
PMV BLD AUTO: 10.2 FL (ref 9.2–12.9)
POCT GLUCOSE: 129 MG/DL (ref 70–110)
POCT GLUCOSE: 168 MG/DL (ref 70–110)
POCT GLUCOSE: 175 MG/DL (ref 70–110)
POCT GLUCOSE: 176 MG/DL (ref 70–110)
POCT GLUCOSE: 219 MG/DL (ref 70–110)
POCT GLUCOSE: 250 MG/DL (ref 70–110)
POCT GLUCOSE: 317 MG/DL (ref 70–110)
POCT GLUCOSE: 78 MG/DL (ref 70–110)
POCT GLUCOSE: >500 MG/DL (ref 70–110)
POCT GLUCOSE: >500 MG/DL (ref 70–110)
POTASSIUM SERPL-SCNC: 4.1 MMOL/L (ref 3.5–5.1)
POTASSIUM SERPL-SCNC: 4.4 MMOL/L (ref 3.5–5.1)
POTASSIUM SERPL-SCNC: 4.4 MMOL/L (ref 3.5–5.1)
PROT UR QL STRIP: ABNORMAL
PROT UR QL STRIP: ABNORMAL
RBC # BLD AUTO: 3.91 M/UL (ref 4.6–6.2)
RBC #/AREA URNS AUTO: >100 /HPF (ref 0–4)
RBC #/AREA URNS AUTO: >100 /HPF (ref 0–4)
SODIUM SERPL-SCNC: 131 MMOL/L (ref 136–145)
SODIUM SERPL-SCNC: 136 MMOL/L (ref 136–145)
SODIUM SERPL-SCNC: 140 MMOL/L (ref 136–145)
SP GR UR STRIP: 1.02 (ref 1–1.03)
SP GR UR STRIP: 1.02 (ref 1–1.03)
URN SPEC COLLECT METH UR: ABNORMAL
URN SPEC COLLECT METH UR: ABNORMAL
VANCOMYCIN SERPL-MCNC: 17.5 UG/ML
WBC # BLD AUTO: 16.39 K/UL (ref 3.9–12.7)
WBC #/AREA URNS AUTO: >100 /HPF (ref 0–5)
WBC #/AREA URNS AUTO: >100 /HPF (ref 0–5)
WBC CLUMPS UR QL AUTO: ABNORMAL
WBC CLUMPS UR QL AUTO: ABNORMAL
YEAST UR QL AUTO: ABNORMAL
YEAST UR QL AUTO: ABNORMAL

## 2022-10-06 PROCEDURE — 63600175 PHARM REV CODE 636 W HCPCS: Performed by: STUDENT IN AN ORGANIZED HEALTH CARE EDUCATION/TRAINING PROGRAM

## 2022-10-06 PROCEDURE — 87086 URINE CULTURE/COLONY COUNT: CPT | Performed by: EMERGENCY MEDICINE

## 2022-10-06 PROCEDURE — 63600175 PHARM REV CODE 636 W HCPCS: Performed by: INTERNAL MEDICINE

## 2022-10-06 PROCEDURE — 25000003 PHARM REV CODE 250: Performed by: STUDENT IN AN ORGANIZED HEALTH CARE EDUCATION/TRAINING PROGRAM

## 2022-10-06 PROCEDURE — 36415 COLL VENOUS BLD VENIPUNCTURE: CPT | Performed by: STUDENT IN AN ORGANIZED HEALTH CARE EDUCATION/TRAINING PROGRAM

## 2022-10-06 PROCEDURE — 82947 ASSAY GLUCOSE BLOOD QUANT: CPT | Performed by: INTERNAL MEDICINE

## 2022-10-06 PROCEDURE — 99223 PR INITIAL HOSPITAL CARE,LEVL III: ICD-10-PCS | Mod: AI,GC,, | Performed by: INTERNAL MEDICINE

## 2022-10-06 PROCEDURE — 82010 KETONE BODYS QUAN: CPT | Performed by: STUDENT IN AN ORGANIZED HEALTH CARE EDUCATION/TRAINING PROGRAM

## 2022-10-06 PROCEDURE — 80202 ASSAY OF VANCOMYCIN: CPT | Performed by: INTERNAL MEDICINE

## 2022-10-06 PROCEDURE — 84100 ASSAY OF PHOSPHORUS: CPT | Performed by: INTERNAL MEDICINE

## 2022-10-06 PROCEDURE — 11000001 HC ACUTE MED/SURG PRIVATE ROOM

## 2022-10-06 PROCEDURE — 81001 URINALYSIS AUTO W/SCOPE: CPT | Performed by: EMERGENCY MEDICINE

## 2022-10-06 PROCEDURE — 99223 1ST HOSP IP/OBS HIGH 75: CPT | Mod: AI,GC,, | Performed by: INTERNAL MEDICINE

## 2022-10-06 PROCEDURE — 83735 ASSAY OF MAGNESIUM: CPT | Performed by: INTERNAL MEDICINE

## 2022-10-06 PROCEDURE — 83605 ASSAY OF LACTIC ACID: CPT | Mod: 91 | Performed by: STUDENT IN AN ORGANIZED HEALTH CARE EDUCATION/TRAINING PROGRAM

## 2022-10-06 PROCEDURE — 85025 COMPLETE CBC W/AUTO DIFF WBC: CPT | Performed by: STUDENT IN AN ORGANIZED HEALTH CARE EDUCATION/TRAINING PROGRAM

## 2022-10-06 PROCEDURE — 80048 BASIC METABOLIC PNL TOTAL CA: CPT | Performed by: INTERNAL MEDICINE

## 2022-10-06 RX ORDER — SODIUM CHLORIDE AND POTASSIUM CHLORIDE 150; 900 MG/100ML; MG/100ML
INJECTION, SOLUTION INTRAVENOUS CONTINUOUS
Status: DISCONTINUED | OUTPATIENT
Start: 2022-10-06 | End: 2022-10-06

## 2022-10-06 RX ORDER — INSULIN ASPART 100 [IU]/ML
1-10 INJECTION, SOLUTION INTRAVENOUS; SUBCUTANEOUS
Status: DISCONTINUED | OUTPATIENT
Start: 2022-10-06 | End: 2022-10-07

## 2022-10-06 RX ORDER — SODIUM CHLORIDE, SODIUM LACTATE, POTASSIUM CHLORIDE, CALCIUM CHLORIDE 600; 310; 30; 20 MG/100ML; MG/100ML; MG/100ML; MG/100ML
INJECTION, SOLUTION INTRAVENOUS CONTINUOUS
Status: ACTIVE | OUTPATIENT
Start: 2022-10-06 | End: 2022-10-06

## 2022-10-06 RX ORDER — PROCHLORPERAZINE MALEATE 5 MG
5 TABLET ORAL 3 TIMES DAILY PRN
Status: DISCONTINUED | OUTPATIENT
Start: 2022-10-06 | End: 2022-10-11 | Stop reason: HOSPADM

## 2022-10-06 RX ORDER — GABAPENTIN 100 MG/1
100 CAPSULE ORAL 2 TIMES DAILY
Status: DISCONTINUED | OUTPATIENT
Start: 2022-10-06 | End: 2022-10-11 | Stop reason: HOSPADM

## 2022-10-06 RX ORDER — GLUCAGON 1 MG
1 KIT INJECTION
Status: DISCONTINUED | OUTPATIENT
Start: 2022-10-06 | End: 2022-10-11 | Stop reason: HOSPADM

## 2022-10-06 RX ORDER — IBUPROFEN 200 MG
24 TABLET ORAL
Status: DISCONTINUED | OUTPATIENT
Start: 2022-10-06 | End: 2022-10-11 | Stop reason: HOSPADM

## 2022-10-06 RX ORDER — INSULIN ASPART 100 [IU]/ML
10 INJECTION, SOLUTION INTRAVENOUS; SUBCUTANEOUS
Status: DISCONTINUED | OUTPATIENT
Start: 2022-10-06 | End: 2022-10-07

## 2022-10-06 RX ORDER — IBUPROFEN 200 MG
16 TABLET ORAL
Status: DISCONTINUED | OUTPATIENT
Start: 2022-10-06 | End: 2022-10-11 | Stop reason: HOSPADM

## 2022-10-06 RX ORDER — NOREPINEPHRINE BITARTRATE/D5W 4MG/250ML
0-.2 PLASTIC BAG, INJECTION (ML) INTRAVENOUS CONTINUOUS
Status: DISCONTINUED | OUTPATIENT
Start: 2022-10-06 | End: 2022-10-06

## 2022-10-06 RX ORDER — SODIUM,POTASSIUM PHOSPHATES 280-250MG
1 POWDER IN PACKET (EA) ORAL ONCE
Status: DISCONTINUED | OUTPATIENT
Start: 2022-10-06 | End: 2022-10-06

## 2022-10-06 RX ADMIN — SERTRALINE HYDROCHLORIDE 50 MG: 50 TABLET ORAL at 09:10

## 2022-10-06 RX ADMIN — INSULIN ASPART 2 UNITS: 100 INJECTION, SOLUTION INTRAVENOUS; SUBCUTANEOUS at 11:10

## 2022-10-06 RX ADMIN — SODIUM CHLORIDE, SODIUM LACTATE, POTASSIUM CHLORIDE, AND CALCIUM CHLORIDE: .6; .31; .03; .02 INJECTION, SOLUTION INTRAVENOUS at 10:10

## 2022-10-06 RX ADMIN — AMIODARONE HYDROCHLORIDE 200 MG: 200 TABLET ORAL at 09:10

## 2022-10-06 RX ADMIN — PIPERACILLIN SODIUM AND TAZOBACTAM SODIUM 4.5 G: 4; .5 INJECTION, POWDER, LYOPHILIZED, FOR SOLUTION INTRAVENOUS at 01:10

## 2022-10-06 RX ADMIN — GABAPENTIN 100 MG: 100 CAPSULE ORAL at 09:10

## 2022-10-06 RX ADMIN — PIPERACILLIN SODIUM AND TAZOBACTAM SODIUM 4.5 G: 4; .5 INJECTION, POWDER, LYOPHILIZED, FOR SOLUTION INTRAVENOUS at 05:10

## 2022-10-06 RX ADMIN — INSULIN DETEMIR 15 UNITS: 100 INJECTION, SOLUTION SUBCUTANEOUS at 09:10

## 2022-10-06 RX ADMIN — PIPERACILLIN SODIUM AND TAZOBACTAM SODIUM 4.5 G: 4; .5 INJECTION, POWDER, LYOPHILIZED, FOR SOLUTION INTRAVENOUS at 09:10

## 2022-10-06 RX ADMIN — SODIUM CHLORIDE AND POTASSIUM CHLORIDE: .9; .15 SOLUTION INTRAVENOUS at 07:10

## 2022-10-06 RX ADMIN — LACOSAMIDE 100 MG: 50 TABLET, FILM COATED ORAL at 08:10

## 2022-10-06 RX ADMIN — CLOPIDOGREL 75 MG: 75 TABLET, FILM COATED ORAL at 09:10

## 2022-10-06 RX ADMIN — PANTOPRAZOLE SODIUM 40 MG: 40 TABLET, DELAYED RELEASE ORAL at 09:10

## 2022-10-06 RX ADMIN — GABAPENTIN 100 MG: 100 CAPSULE ORAL at 08:10

## 2022-10-06 RX ADMIN — ATORVASTATIN CALCIUM 40 MG: 40 TABLET, FILM COATED ORAL at 09:10

## 2022-10-06 RX ADMIN — VANCOMYCIN HYDROCHLORIDE 500 MG: 500 INJECTION, POWDER, LYOPHILIZED, FOR SOLUTION INTRAVENOUS at 01:10

## 2022-10-06 RX ADMIN — APIXABAN 5 MG: 5 TABLET, FILM COATED ORAL at 08:10

## 2022-10-06 RX ADMIN — INSULIN ASPART 10 UNITS: 100 INJECTION, SOLUTION INTRAVENOUS; SUBCUTANEOUS at 10:10

## 2022-10-06 RX ADMIN — APIXABAN 5 MG: 5 TABLET, FILM COATED ORAL at 09:10

## 2022-10-06 RX ADMIN — PROCHLORPERAZINE MALEATE 5 MG: 5 TABLET ORAL at 11:10

## 2022-10-06 RX ADMIN — LACOSAMIDE 100 MG: 50 TABLET, FILM COATED ORAL at 09:10

## 2022-10-06 RX ADMIN — SODIUM CHLORIDE AND POTASSIUM CHLORIDE: .9; .15 SOLUTION INTRAVENOUS at 02:10

## 2022-10-06 NOTE — ASSESSMENT & PLAN NOTE
Advance Care Planning     Code Status  In light of the patients advanced age, during admission I engaged the the family (Daughter) in a conversation about the patient's preferences for care  at the very end of life. The patient wishes to have a natural, peaceful death.  Along those lines, the patient does not wish to have CPR or other invasive treatments performed when his heart and/or breathing stops. I communicated to the family that a DNR order would be placed in his medical record to reflect this preference.  I spent a total of 5 minutes engaging the patient in this advance care planning discussion.

## 2022-10-06 NOTE — PLAN OF CARE
[facility] - Cardiac Medical ICU  Initial Discharge Assessment       Primary Care Provider: Basim Guerrero MD    Admission Diagnosis: DKA (diabetic ketoacidosis) [E11.10]  Hypotension [I95.9]    Admission Date: 10/5/2022  Expected Discharge Date: 10/10/2022    Discharge Barriers Identified: None    Payor: MEDICARE / Plan: MEDICARE PART A & B / Product Type: Government /     Extended Emergency Contact Information  Primary Emergency Contact: Basia Herrera  Mobile Phone: [phone]  Relation: Daughter  Secondary Emergency Contact: Marisa Sampson  Mobile Phone: [phone]  Relation: Daughter  Preferred language: English   needed? No    Discharge Plan A: Return to nursing home  Discharge Plan B: Skilled Nursing Facility      [id number] - [place] [address]  [address] AT [facility] [address]  [address]  [place] [address]  Phone: [phone] Fax: [phone]    [facility]  [address] [facility] [address]  [facility] [address]  Phone: [phone] Fax: [phone]    [id number]  [place] [address] AT [address] [address]  Phone: [phone] Fax: [phone]      Initial Assessment (most recent)       Adult Discharge Assessment - 10/06/22 1419          Discharge Assessment    Assessment Type Discharge Planning Assessment     Confirmed/corrected address, phone number and insurance Yes     Confirmed Demographics Correct on Facesheet     Source of Information patient;family     When was your last doctors appointment? --   Not sure    Communicated ALLIE with patient/caregiver Date not available/Unable to determine     Reason For Admission DKA/Hyperglycemia/Depression     Lives With facility resident     Facility Arrived From: [facility]     Do you expect to return to your current living situation? Yes     Do you have help at home or someone to help you  manage your care at home? Yes     Who are your caregiver(s) and their phone number(s)? [facility] [phone]     Prior to hospitilization cognitive status: Alert/Oriented     Current cognitive status: Alert/Oriented     Walking or Climbing Stairs Difficulty ambulation difficulty, requires equipment     Mobility Management Rolling walker and Wheelchair     Dressing/Bathing Difficulty bathing difficulty, requires equipment     Home Layout Able to live on 1st floor     Equipment Currently Used at Home walker, rolling;wheelchair     Readmission within 30 days? No     Patient currently being followed by outpatient case management? No     Do you currently have service(s) that help you manage your care at home? Yes     How Many hours does patient receive services 3.43     Name and Contact number of agency [facility]/[phone]     Is the pt/caregiver preference to resume services with current agency Yes     Do you take prescription medications? Yes     Do you have prescription coverage? Yes     Coverage Medicare A & B     Do you have any problems affording any of your prescribed medications? No     Is the patient taking medications as prescribed? yes     Who is going to help you get home at discharge? EMS     How do you get to doctors appointments? health plan transportation     Are you on dialysis? No     Do you take coumadin? No     Discharge Plan A Return to nursing home     Discharge Plan B Skilled Nursing Facility     DME Needed Upon Discharge  wheelchair     Discharge Plan discussed with: Patient;Adult children     Discharge Barriers Identified None        Alcohol Use    Q1: How often do you have a drink containing alcohol? Never     Q2: How many drinks containing alcohol do you have on a typical day when you are drinking? Patient does not drink     Q3: How often do you have six or more drinks on one occasion? Never        Relationship/Environment    Name(s) of  Who Lives With Patient Pt is a resident @ [facility]                   Spoke to patient and son (Kamar Muñoz Jr).  Patient lives in a nursing home.  Post hospital stay pt will return to [facility].  Patient has transportation at discharge with EMS.  There have been no hospitalizations within the last 30 days per patient. Verified patient's PCP and preferred Pharmacy.  Patient states not on Coumadin and is not receiving dialysis.  All questions answered regarding Case Management Discharge Planning, patient and son verbalized understanding.   SW will continue to follow and assist with post discharge planning needs.     Estee Fang LMSW  [facility]  [phone]

## 2022-10-06 NOTE — CONSULTS
Nutrition-Related Diabetes Education      Time Spent: 10 minutes     Learners: Patient     Current HbA1c: 10.6    Is patient aware of their A1c and their goal A1c? Yes    Nutrition Education with handouts: MyPlate, CHO counting     Comments: Pt reports he is familiar with the diabetic diet. Provided and explained handout detailing sources of carbohydrates, appropriate serving sizes, and the plate method for meal planning. Pt voiced understanding. All questions and concerns answered.    Diet advanced this AM - will monitor energy intake & weight changes. ONS added. UBW: 200# per chart review, however per H & P, pt w/ decreased appetite PTA x 4 weeks 2/2 passing away of wife.    Noted pt educated on Diabetic diet 6 times this year.    Barriers to Learning: Non-compliance     Follow up: Yes    Please consult as needed.    Thank you!  MS Baylee, RD, LDN

## 2022-10-06 NOTE — NURSING
CMICU DAILY GOALS       A: Awake    RASS: Goal -    Actual -     Restraint necessity:    B: Breathe   SBT: Not intubated   C: Coordinate A & B, analgesics/sedatives   Pain: managed    SAT: Not intubated  D: Delirium   CAM-ICU: Overall CAM-ICU: Negative  E: Early(intubated/ Progressive (non-intubated) Mobility   MOVE Screen: Pass   Activity: Activity Management: Rolling - L1  FAS: Feeding/Nutrition   Diet order: Diet/Nutrition Received: NPO,    T: Thrombus   DVT prophylaxis: VTE Required Core Measure: Pharmacological prophylaxis initiated/maintained  H: HOB Elevation   Head of Bed (HOB) Positioning: HOB at 30-45 degrees  U: Ulcer Prophylaxis   GI: yes  G: Glucose control   managed Glycemic Management: blood glucose monitored  S: Skin   Bathing/Skin Care: incontinence care  Device Skin Pressure Protection: absorbent pad utilized/changed, adhesive use limited, positioning supports utilized, pressure points protected, skin-to-device areas padded, skin-to-skin areas padded  Pressure Reduction Devices: pressure-redistributing mattress utilized, positioning supports utilized, heel offloading device utilized, foam padding utilized  Pressure Reduction Techniques: frequent weight shift encouraged, weight shift assistance provided, rest period provided between sit times, pressure points protected, positioned off wounds, heels elevated off bed  Skin Protection: adhesive use limited, tubing/devices free from skin contact, transparent dressing maintained, skin-to-device areas padded, skin-to-skin areas padded, incontinence pads utilized  B: Bowel Function   no issues   I: Indwelling Catheters   Mccord necessity:     CVC necessity: No  D: De-escalation Antibiotics   Yes    Family/Goals of care/Code Status   Code Status: DNR    24H Vital Sign Range  Temp:  [97.3 °F (36.3 °C)-97.9 °F (36.6 °C)]   Pulse:  []   Resp:  [12-36]   BP: ()/(35-66)   SpO2:  [86 %-99 %]      Shift Events   No acute events throughout shift. Pt  glucose and lactic trending down. Off pressors.    VS and assessment per flow sheet, patient progressing towards goals as tolerated, plan of care reviewed with family, all concerns addressed, will continue to monitor.

## 2022-10-06 NOTE — ASSESSMENT & PLAN NOTE
-CT head on admission noted Extensive dural venous sinus pneumocephalus.    -However, findings are favored to be benign in etiology.    -Per radiology, differential considerations include intravenous induced pneumocephalus or barotrauma (i.e. nose blowing).  Septic thrombosis or craniofacial trauma felt less likely.

## 2022-10-06 NOTE — ASSESSMENT & PLAN NOTE
Lactic acidosis on admission likely secondary to dehydration from DKA    Recent Labs     10/05/22  1505 10/05/22  1901 10/05/22  2211 10/06/22  0236   LACTATE 10.6* >12.0* 11.4* 5.6*  5.6*       PLAN  Trend lactate

## 2022-10-06 NOTE — ASSESSMENT & PLAN NOTE
-Hx of apixaban and clopidogrel use.   -Overnight on 10/06 nursing noted blood in stool. CT abdomen noted stercoral proctitis.  -Hgb drop, but unclear if from improvement in DKA associated dehydration vs true bleed  Recent Labs     10/05/22  1505 10/06/22  0236   HGB 13.4* 11.7*              PLAN  -Continue to monitor  -Trend CBC, transfuse for Hgb<7  -GI consult if worsening

## 2022-10-06 NOTE — SUBJECTIVE & OBJECTIVE
Interval History/Significant Events: No acute events overnight, afebrile. Weaned off vasopressors. DKA improving.       Review of Systems   Constitutional:  Negative for chills and fever.   HENT:  Negative for congestion and rhinorrhea.    Eyes:  Negative for discharge and visual disturbance.   Respiratory:  Negative for cough and shortness of breath.    Cardiovascular:  Negative for chest pain, palpitations and leg swelling.   Gastrointestinal:  Positive for nausea and vomiting. Negative for constipation and diarrhea.   Genitourinary:  Negative for difficulty urinating and dysuria.   Musculoskeletal:  Positive for arthralgias (neuropathy). Negative for back pain.   Neurological:  Negative for dizziness and headaches.   Psychiatric/Behavioral:  Negative for sleep disturbance.    Objective:     Vital Signs (Most Recent):  Temp: 97.5 °F (36.4 °C) (10/06/22 0700)  Pulse: 73 (10/06/22 0700)  Resp: 20 (10/06/22 0700)  BP: (!) 119/56 (10/06/22 0700)  SpO2: 98 % (10/06/22 0700)   Vital Signs (24h Range):  Temp:  [97.3 °F (36.3 °C)-97.9 °F (36.6 °C)] 97.5 °F (36.4 °C)  Pulse:  [] 73  Resp:  [12-36] 20  SpO2:  [86 %-99 %] 98 %  BP: ()/(35-66) 119/56   Weight: 86.2 kg (190 lb)  Body mass index is 24.39 kg/m².      Intake/Output Summary (Last 24 hours) at 10/6/2022 0841  Last data filed at 10/6/2022 0750  Gross per 24 hour   Intake 3035.12 ml   Output 750 ml   Net 2285.12 ml       Physical Exam  Vitals and nursing note reviewed.   Constitutional:       General: He is not in acute distress.     Appearance: He is normal weight. He is not ill-appearing, toxic-appearing or diaphoretic.   HENT:      Head: Normocephalic and atraumatic.      Comments: Lesion on scalp consistent with actinic keratosis.        Mouth/Throat:      Mouth: Mucous membranes are dry.      Pharynx: Oropharynx is clear. No oropharyngeal exudate.   Eyes:      General: No scleral icterus.        Right eye: No discharge.         Left eye: No  discharge.   Cardiovascular:      Rate and Rhythm: Normal rate and regular rhythm.      Heart sounds: Normal heart sounds.   Pulmonary:      Effort: Pulmonary effort is normal. No respiratory distress.   Abdominal:      General: Abdomen is flat. Bowel sounds are normal. There is no distension.      Palpations: Abdomen is soft.      Tenderness: There is no abdominal tenderness. There is no guarding.   Musculoskeletal:         General: No swelling.      Cervical back: No rigidity.      Right lower leg: No edema.      Left lower leg: No edema.   Skin:     General: Skin is warm and dry.      Coloration: Skin is not jaundiced.   Neurological:      Mental Status: He is alert and oriented to person, place, and time. Mental status is at baseline.       Vents:     Lines/Drains/Airways       Drain  Duration             Male External Urinary Catheter 10/05/22 1532 Medium <1 day              Peripheral Intravenous Line  Duration                  Peripheral IV - Single Lumen 10/05/22 1501 20 G Left Forearm <1 day         Peripheral IV - Single Lumen 10/05/22 1736 18 G Left Antecubital <1 day         Peripheral IV - Single Lumen 10/05/22 1737 18 G Posterior;Right Forearm <1 day         Peripheral IV - Single Lumen 10/05/22 1800 18 G Left Forearm <1 day                  Significant Labs:    CBC/Anemia Profile:  Recent Labs   Lab 10/05/22  1505 10/06/22  0236   WBC 12.61 16.39*   HGB 13.4* 11.7*   HCT 43.6 35.2*    186   MCV 98 90   RDW 11.8 11.4*        Chemistries:  Recent Labs   Lab 10/05/22  1505 10/05/22  1505 10/05/22  1724 10/05/22  1837 10/05/22  1943/22  2356 10/06/22  0236   *  --  127* 127*  --  131* 136   K 6.3*  --  6.5* 5.6*  --  4.1 4.4   CL 88*  --  88* 89*  --  96 100   CO2 9*  --  <5* <5*  --  14* 21*   BUN 61*  --  61* 61*  --  57* 58*   CREATININE 3.1*  --  3.1* 3.2*  --  2.8* 2.8*   CALCIUM 9.1  --  8.6* 8.7  --  8.3* 9.0   ALBUMIN 3.2*  --   --   --   --   --   --    PROT 7.0  --   --   --    --   --   --    BILITOT 0.6  --   --   --   --   --   --    ALKPHOS 148*  --   --   --   --   --   --    ALT 35  --   --   --   --   --   --    AST 32  --   --   --   --   --   --    MG  --   --  2.1 2.1  --   --  2.0   PHOS  --    < > 10.0* 10.1* 9.2* 4.4 3.5    < > = values in this interval not displayed.       A1C:   Recent Labs   Lab 10/05/22  1724   HGBA1C 10.6*     Lactic Acid:   Recent Labs   Lab 10/05/22  1901 10/05/22  2211 10/06/22  0236   LACTATE >12.0* 11.4* 5.6*  5.6*     POCT Glucose:   Recent Labs   Lab 10/06/22  0459 10/06/22  0549 10/06/22  0645   POCTGLUCOSE 317* 250* 219*     All pertinent labs within the past 24 hours have been reviewed.    Significant Imaging:  I have reviewed all pertinent imaging results/findings within the past 24 hours.

## 2022-10-06 NOTE — PROGRESS NOTES
Pharmacokinetic Initial Assessment: IV Vancomycin    Assessment/Plan:  Patient with BRENDAN creatinine 3.2 mg/dL. Will pulse dose.  Patient received loading dose of IV vancomycin 1750 mg once while in the ED. Will follow with subsequent doses when random concentrations are less than 20 mcg/mL  Desired empiric serum trough concentration is 10 to 20 mcg/mL  Draw vancomycin random level on 10/6 at 0300.  Pharmacy will continue to follow and monitor vancomycin.      Please contact pharmacy at extension [phone] with any questions regarding this assessment.     Thank you for the consult,   Tiffany Freitas       Patient brief summary:  Kamar Muñoz is a 77 y.o. male initiated on antimicrobial therapy with IV Vancomycin for treatment of suspected bacteremia    Drug Allergies:   Review of patient's allergies indicates:   Allergen Reactions    Iodine      Other reaction(s): swelling  Other reaction(s): Itching  Other reaction(s): Rash       Actual Body Weight:   86.2 kg    Renal Function:   Estimated Creatinine Clearance: 21.8 mL/min (A) (based on SCr of 3.2 mg/dL (H)).,     Dialysis Method (if applicable):  N/A    CBC (last 72 hours):  Recent Labs   Lab Result Units 10/05/22  1505 10/05/22  1724   WBC K/uL 12.61  --    Hemoglobin g/dL 13.4*  --    Hemoglobin A1C %  --  10.6*   Hematocrit % 43.6  --    Platelets K/uL 200  --    Gran % % 82.9*  --    Lymph % % 11.2*  --    Mono % % 4.8  --    Eosinophil % % 0.2  --    Basophil % % 0.2  --    Differential Method  Automated  --        Metabolic Panel (last 72 hours):  Recent Labs   Lab Result Units 10/05/22  1505 10/05/22  1724 10/05/22  1837   Sodium mmol/L 128* 127* 127*   Potassium mmol/L 6.3* 6.5* 5.6*   Chloride mmol/L 88* 88* 89*   CO2 mmol/L 9* <5* <5*   Glucose mg/dL 1,042* 1,110* 1,109*  1,109*   BUN mg/dL 61* 61* 61*   Creatinine mg/dL 3.1* 3.1* 3.2*   Albumin g/dL 3.2*  --   --    Total Bilirubin mg/dL 0.6  --   --    Alkaline Phosphatase U/L 148*  --   --    AST  U/L 32  --   --    ALT U/L 35  --   --    Magnesium mg/dL  --  2.1 2.1   Phosphorus mg/dL  --  10.0* 10.1*       Drug levels (last 3 results):  No results for input(s): VANCOMYCINRA, VANCORANDOM, VANCOMYCINPE, VANCOPEAK, VANCOMYCINTR, VANCOTROUGH in the last 72 hours.    Microbiologic Results:  Microbiology Results (last 7 days)       Procedure Component Value Units Date/Time    Blood culture #1 **CANNOT BE ORDERED STAT** [[id number]]     Order Status: Canceled Specimen: Blood     Blood culture #2 **CANNOT BE ORDERED STAT** [[id number]]     Order Status: Canceled Specimen: Blood     Blood culture x two cultures. Draw prior to antibiotics. [[id number]] Collected: 10/05/22 1513    Order Status: Sent Specimen: Blood from Peripheral, Forearm, Right Updated: 10/05/22 1521    Blood culture x two cultures. Draw prior to antibiotics. [[id number]] Collected: 10/05/22 1505    Order Status: Sent Specimen: Blood from Peripheral, Forearm, Left Updated: 10/05/22 1513

## 2022-10-06 NOTE — ASSESSMENT & PLAN NOTE
-DKA in the setting of recently decreased oral intake secondary to depression after death of wife 4 weeks ago     Last A1c:   Lab Results   Component Value Date    HGBA1C 10.6 (H) 10/05/2022      Recent Labs     10/06/22  0459 10/06/22  0549 10/06/22  0645   POCTGLUCOSE 317* 250* 219*         Home regimen:     -Per chart review:  -insulin aspart U-100 (NOVOLOG) 100 unit/mL (3 mL) InPn pen   ---Inject 4 Units into the skin with snacks (>200).   -insulin aspart U-100 (NOVOLOG) 100 unit/mL (3 mL) InPn pen   ---Inject 8-16 Units into the skin 3 (three) times daily.   -insulin glargine,hum.rec.anlog (LANTUS SOLOSTAR U-100 INSULIN SUBQ)  ---Inject 12 Units into the skin 2 (two) times a day    PLAN  -Rule out infectious source as precipitating factors. Continue Vancomycin, Zosyn. Followup cultures, urinalysis. Deescalate antibiotics as necessitated. Wean vasopressors as tolerated.  - Insulin drip  - Will monitor glucose results and adjust insulin regimen accordingly  - Fluids: Lactated Ringers  - Diet: Bariatric clear (no sugar)  - Consider bicarbonate drip (if pH <6.9 or hyperkalemic with EKG changes)  - BMP q4hr  - POCT glucose q1hr

## 2022-10-06 NOTE — ASSESSMENT & PLAN NOTE
"-Hx of CAD s/p placement of 2 LAUREN in RCA in 01/09/2022.   -Patient denied chest pain on admission, troponin WNL  -EKG on admission(10/05) Sinus rhythm with 1st degree A-V block  -Per chart review, "ASA discontinued 4/13 due to bleeding risk with triple therapy; continuing Plavix and Eliquis" "No need for triple therapy (ASA/Plavix/eliquis) beyond 1 month"     PLAN  -Continue home statin  -Continue home apixaban, clopidogrel   -Metoprolol and Losartan held due to hypotension requiring vasopressors  "

## 2022-10-06 NOTE — ASSESSMENT & PLAN NOTE
Patient has acute metabolic encephalopathy that is likely secondary to DKA, dehydration.  Treatment for underlying cause is underway.     CT head did not note any acute infarct or intracranial hemorrhage.     PLAN  -DKA treatment  -Followup Blood cultures, urinalysis. Deescalate antibiotics as necessitated  -Followup Lacosamide levels  -Will monitor neuro checks carefully, avoid narcotics and benzos that will exacerbate agitation, and use PRN anti-psychotics for controls of behavior for self harm.

## 2022-10-06 NOTE — PROGRESS NOTES
[facility] - Cardiac Medical ICU  Critical Care Medicine  Progress Note    Patient Name: Kamar Muñoz  MRN: [id number]  Admission Date: 10/5/2022  Hospital Length of Stay: 1 days  Code Status: DNR  Attending Provider: Jason Mckeon MD  Primary Care Provider: Basim Guerrero MD   Principal Problem: Diabetic ketoacidosis without coma associated with type 2 diabetes mellitus    Subjective:     HPI:  Patient information was obtained from patient, relative(daughter), past medical records, and ER records due to patient's waxing and waning altered mental status.       Mr. Muñoz is a 77 y.o M/ w/ hx of stroke, T2DM(poorly controlled with recurrent DKA), CAD, HLD, paroxysmal Afib, and seizures who presented to the ED via EMS from [facility] with concerns of nausea/vomiting, hypotension, hyperglycemia. Per daughter, he has been depressed and has had very poor oral intake since his wife passed away 4 weeks ago.  He has not been taking care of himself. He was seen by his PCP 3 days ago who adjusted his anitdepressants dose recently. He also had a fall last Friday while he was getting dressed that was described as mild. It was not associated with head trauma, as most of fall was on his elbow.     ED course notable for hyperglycemia, keotacidosis, hypotension requiring vasopressors. Critical Care Medicine was consulted for evaluation.       Interval History/Significant Events: No acute events overnight, afebrile. Weaned off vasopressors. DKA improving. He is more alert and oriented and appears back to his baseline.      Review of Systems   Constitutional:  Negative for chills and fever.   HENT:  Negative for congestion and rhinorrhea.    Eyes:  Negative for discharge and visual disturbance.   Respiratory:  Negative for cough and shortness of breath.    Cardiovascular:  Negative for chest pain, palpitations and leg swelling.   Gastrointestinal:  Positive for nausea and vomiting. Negative for  constipation and diarrhea.   Genitourinary:  Negative for difficulty urinating and dysuria.   Musculoskeletal:  Positive for arthralgias (neuropathy). Negative for back pain.   Neurological:  Negative for dizziness and headaches.   Psychiatric/Behavioral:  Negative for sleep disturbance.    Objective:     Vital Signs (Most Recent):  Temp: 97.5 °F (36.4 °C) (10/06/22 0700)  Pulse: 73 (10/06/22 0700)  Resp: 20 (10/06/22 0700)  BP: (!) 119/56 (10/06/22 0700)  SpO2: 98 % (10/06/22 0700)   Vital Signs (24h Range):  Temp:  [97.3 °F (36.3 °C)-97.9 °F (36.6 °C)] 97.5 °F (36.4 °C)  Pulse:  [] 73  Resp:  [12-36] 20  SpO2:  [86 %-99 %] 98 %  BP: ()/(35-66) 119/56   Weight: 86.2 kg (190 lb)  Body mass index is 24.39 kg/m².      Intake/Output Summary (Last 24 hours) at 10/6/2022 0841  Last data filed at 10/6/2022 0750  Gross per 24 hour   Intake 3035.12 ml   Output 750 ml   Net 2285.12 ml       Physical Exam  Vitals and nursing note reviewed.   Constitutional:       General: He is not in acute distress.     Appearance: He is normal weight. He is not ill-appearing, toxic-appearing or diaphoretic.   HENT:      Head: Normocephalic and atraumatic.      Comments: Lesion on scalp consistent with actinic keratosis.        Mouth/Throat:      Mouth: Mucous membranes are dry.      Pharynx: Oropharynx is clear. No oropharyngeal exudate.   Eyes:      General: No scleral icterus.        Right eye: No discharge.         Left eye: No discharge.   Cardiovascular:      Rate and Rhythm: Normal rate and regular rhythm.      Heart sounds: Normal heart sounds.   Pulmonary:      Effort: Pulmonary effort is normal. No respiratory distress.   Abdominal:      General: Abdomen is flat. Bowel sounds are normal. There is no distension.      Palpations: Abdomen is soft.      Tenderness: There is no abdominal tenderness. There is no guarding.   Musculoskeletal:         General: No swelling.      Cervical back: No rigidity.      Right lower leg:  No edema.      Left lower leg: No edema.   Skin:     General: Skin is warm and dry.      Coloration: Skin is not jaundiced.   Neurological:      Mental Status: He is alert and oriented to person, place, and time. Mental status is at baseline.       Vents:     Lines/Drains/Airways       Drain  Duration             Male External Urinary Catheter 10/05/22 1532 Medium <1 day              Peripheral Intravenous Line  Duration                  Peripheral IV - Single Lumen 10/05/22 1501 20 G Left Forearm <1 day         Peripheral IV - Single Lumen 10/05/22 1736 18 G Left Antecubital <1 day         Peripheral IV - Single Lumen 10/05/22 1737 18 G Posterior;Right Forearm <1 day         Peripheral IV - Single Lumen 10/05/22 1800 18 G Left Forearm <1 day                  Significant Labs:    CBC/Anemia Profile:  Recent Labs   Lab 10/05/22  1505 10/06/22  0236   WBC 12.61 16.39*   HGB 13.4* 11.7*   HCT 43.6 35.2*    186   MCV 98 90   RDW 11.8 11.4*        Chemistries:  Recent Labs   Lab 10/05/22  1505 10/05/22  1505 10/05/22  1724 10/05/22  1837 10/05/22  1943/22  2356 10/06/22  0236   *  --  127* 127*  --  131* 136   K 6.3*  --  6.5* 5.6*  --  4.1 4.4   CL 88*  --  88* 89*  --  96 100   CO2 9*  --  <5* <5*  --  14* 21*   BUN 61*  --  61* 61*  --  57* 58*   CREATININE 3.1*  --  3.1* 3.2*  --  2.8* 2.8*   CALCIUM 9.1  --  8.6* 8.7  --  8.3* 9.0   ALBUMIN 3.2*  --   --   --   --   --   --    PROT 7.0  --   --   --   --   --   --    BILITOT 0.6  --   --   --   --   --   --    ALKPHOS 148*  --   --   --   --   --   --    ALT 35  --   --   --   --   --   --    AST 32  --   --   --   --   --   --    MG  --   --  2.1 2.1  --   --  2.0   PHOS  --    < > 10.0* 10.1* 9.2* 4.4 3.5    < > = values in this interval not displayed.       A1C:   Recent Labs   Lab 10/05/22  1724   HGBA1C 10.6*     Lactic Acid:   Recent Labs   Lab 10/05/22  1901 10/05/22  2211 10/06/22  0236   LACTATE >12.0* 11.4* 5.6*  5.6*     POCT Glucose:  "  Recent Labs   Lab 10/06/22  0459 10/06/22  0549 10/06/22  0645   POCTGLUCOSE 317* 250* 219*     All pertinent labs within the past 24 hours have been reviewed.    Significant Imaging:  I have reviewed all pertinent imaging results/findings within the past 24 hours.      ABG  Recent Labs   Lab 10/05/22  1631   PH 7.019*   PO2 27*   PCO2 38.6   HCO3 9.9*   BE -21     Assessment/Plan:     Neuro  Pneumocephalus  -CT head on admission noted Extensive dural venous sinus pneumocephalus.    -However, findings are favored to be benign in etiology.    -Per radiology, differential considerations include intravenous induced pneumocephalus or barotrauma (i.e. nose blowing).  Septic thrombosis or craniofacial trauma felt less likely.     History of partial seizures  Hx of focal seizures    PLAN  -Continue home Lacosamide  -Followup Lacosamide levels    Encephalopathy, metabolic  Patient has acute metabolic encephalopathy that is likely secondary to DKA, dehydration.  Treatment for underlying cause is underway.     CT head did not note any acute infarct or intracranial hemorrhage.     PLAN  -DKA treatment  -Followup Blood cultures, urinalysis. Deescalate antibiotics as necessitated  -Followup Lacosamide levels  -Will monitor neuro checks carefully, avoid narcotics and benzos that will exacerbate agitation, and use PRN anti-psychotics for controls of behavior for self harm.    Psychiatric  Adjustment disorder with disturbance of conduct  Recently worsening depression due to loss of spouse 4 weeks ago.     PLAN  Continue home Sertraline    Cardiac/Vascular  History of ST elevation myocardial infarction (STEMI)  See "Coronary artery disease involving native coronary artery of native heart with unstable angina pectoris" A&P    Paroxysmal atrial fibrillation  EKG on admit sinus rhythm    PLAN  Continue home amiodarone, held home metoprolol due to hypotension requiring vasopressors    Coronary artery disease involving native coronary " "artery of native heart with unstable angina pectoris  -Hx of CAD s/p placement of 2 LAUREN in RCA in 01/09/2022.   -Patient denied chest pain on admission, troponin WNL  -EKG on admission(10/05) Sinus rhythm with 1st degree A-V block  -Per chart review, "ASA discontinued 4/13 due to bleeding risk with triple therapy; continuing Plavix and Eliquis" "No need for triple therapy (ASA/Plavix/eliquis) beyond 1 month"     PLAN  -Continue home statin  -Continue home apixaban, clopidogrel   -Metoprolol and Losartan held due to hypotension requiring vasopressors    Essential hypertension  Home medications: Metoprolol, losartan      PLAN  Hold home meds secondary to hypotension requiring vasopressors    Renal/  Lactic acidosis  Lactic acidosis on admission likely secondary to dehydration from DKA    Recent Labs     10/05/22  1505 10/05/22  1901 10/05/22  2211 10/06/22  0236   LACTATE 10.6* >12.0* 11.4* 5.6*  5.6*       PLAN  Trend lactate    BRENDAN (acute kidney injury)  Creatinine 3.1 on admit, baseline around 1.1  Possible Etiologies:  - Likely pre-renal from dehydration and volume losses secondary to DKA    Recent Labs     10/05/22  1837 10/05/22  2356 10/06/22  0236   CREATININE 3.2* 2.8* 2.8*   BUN 61* 57* 58*     Estimated Creatinine Clearance: 24.9 mL/min (A) (based on SCr of 2.8 mg/dL (H)).    Plan:   -Fluid resuscitation  -Monitor urine output and serial BMP and adjust therapy as needed.   - Strict I&Os and daily weights   - Avoid nephrotoxic agents such as NSAIDs, gadolinium and IV radiocontrast.  - Renally dose meds to current GFR.  - Maintain MAP > 65.    ID  Positive blood cultures  Microbiology Results (last 7 days)       Procedure Component Value Units Date/Time    Blood culture x two cultures. Draw prior to antibiotics. [[id number]] Collected: 10/05/22 1505    Order Status: Completed Specimen: Blood from Peripheral, Forearm, Left Updated: 10/06/22 0240     Blood Culture, Routine Gram stain kasey bottle: Gram positive " rods      Results called to and read back by:Mini Muniz RN 10/06/2022 02:39    Narrative:      Aerobic and anaerobic    Urine culture [[id number]] Collected: 10/05/22 2151    Order Status: No result Specimen: Urine Updated: 10/06/22 0011    Blood culture x two cultures. Draw prior to antibiotics. [[id number]] Collected: 10/05/22 1513    Order Status: Completed Specimen: Blood from Peripheral, Forearm, Right Updated: 10/05/22 2145     Blood Culture, Routine No Growth to date    Narrative:      Aerobic and anaerobic    Blood culture #1 **CANNOT BE ORDERED STAT** [[id number]]     Order Status: Canceled Specimen: Blood     Blood culture #2 **CANNOT BE ORDERED STAT** [[id number]]     Order Status: Canceled Specimen: Blood           Recent Labs     10/05/22  1505 10/06/22  0236   WBC 12.61 16.39*     -CXR nonacute. CT abdomen concerning for stercoral colitis.  -Differential diagnosis includes shock as he is requiring pressors vs. Contaminants.    PLAN  -Followup repeat blood cultures and urine cultures. Deescalate antibiotics as necessitated    Hematology  Chronic anticoagulation  Continue home apixaban    Endocrine  * Diabetic ketoacidosis without coma associated with type 2 diabetes mellitus  -DKA in the setting of recently decreased oral intake secondary to depression after death of wife 4 weeks ago     Last A1c:   Lab Results   Component Value Date    HGBA1C 10.6 (H) 10/05/2022      Recent Labs     10/06/22  0459 10/06/22  0549 10/06/22  0645   POCTGLUCOSE 317* 250* 219*         Home regimen:     -Per chart review:  -insulin aspart U-100 (NOVOLOG) 100 unit/mL (3 mL) InPn pen   ---Inject 4 Units into the skin with snacks (>200).   -insulin aspart U-100 (NOVOLOG) 100 unit/mL (3 mL) InPn pen   ---Inject 8-16 Units into the skin 3 (three) times daily.   -insulin glargine,hum.rec.anlog (LANTUS SOLOSTAR U-100 INSULIN SUBQ)  ---Inject 12 Units into the skin 2 (two) times a day    PLAN  -Rule out infectious source as  "precipitating factors. Continue Vancomycin, Zosyn. Followup cultures, urinalysis. Deescalate antibiotics as necessitated. Wean vasopressors as tolerated.  - Insulin drip  - Will monitor glucose results and adjust insulin regimen accordingly  - Fluids: Lactated Ringers  - Diet: Bariatric clear (no sugar)  - Consider bicarbonate drip (if pH <6.9 or hyperkalemic with EKG changes)  - BMP q4hr  - POCT glucose q1hr    Type 2 diabetes mellitus with hyperglycemia, with long-term current use of insulin  See "Diabetic ketoacidosis without coma associated with type 2 diabetes mellitus" A&P    GI  Blood in stool  -Hx of apixaban and clopidogrel use.   -Overnight on 10/06 nursing noted blood in stool. CT abdomen noted stercoral proctitis.  -Hgb drop, but unclear if from improvement in DKA associated dehydration vs true bleed  Recent Labs     10/05/22  1505 10/06/22  0236   HGB 13.4* 11.7*              PLAN  -Continue to monitor  -Trend CBC, transfuse for Hgb<7  -GI consult if worsening    Palliative Care  Goals of care, counseling/discussion  Advance Care Planning     Code Status  In light of the patients advanced age, during admission I engaged the the family (Daughter) in a conversation about the patient's preferences for care  at the very end of life. The patient wishes to have a natural, peaceful death.  Along those lines, the patient does not wish to have CPR or other invasive treatments performed when his heart and/or breathing stops. I communicated to the family that a DNR order would be placed in his medical record to reflect this preference.  I spent a total of 5 minutes engaging the patient in this advance care planning discussion.         Other  Debility  Consult PT/OT after improvement in patient AMS     Critical Care Daily Checklist:    A: Awake: RASS Goal/Actual Goal:    Actual: Long Agitation Sedation Scale (RASS): Alert and calm   B: Spontaneous Breathing Trial Performed?     C: SAT & SBT Coordinated?  N/A   "                    D: Delirium: CAM-ICU Overall CAM-ICU: Negative   E: Early Mobility Performed? Yes   F: Feeding Goal:    Status:     Current Diet Order   Procedures    Diet NPO      AS: Analgesia/Sedation N/A     T: Thromboembolic Prophylaxis Apixaban   H: HOB > 300 Yes   U: Stress Ulcer Prophylaxis (if needed) Pantoprazole   G: Glucose Control Insulin drip   B: Bowel Function Stool Occurrence: 0   I: Indwelling Catheter (Lines & Mccord) Necessity PIV, Condom Catheter   D: De-escalation of Antimicrobials/Pharmacotherapies Not yet    Plan for the day/ETD Transition insulin    Code Status:  Family/Goals of Care: DNR         Critical secondary to Patient has a condition that poses threat to life and bodily function: DKA and shock requiring pressors     Critical care was time spent personally by me on the following activities: development of treatment plan with patient or surrogate and bedside caregivers, discussions with consultants, evaluation of patient's response to treatment, examination of patient, ordering and performing treatments and interventions, ordering and review of laboratory studies, ordering and review of radiographic studies, pulse oximetry, re-evaluation of patient's condition. This critical care time did not overlap with that of any other provider or involve time for any procedures.    Earnest Hill,   Critical Care Medicine  [facility] - Cardiac Medical ICU

## 2022-10-06 NOTE — PROGRESS NOTES
Pharmacokinetic Assessment Follow Up: IV Vancomycin    Vancomycin serum concentration assessment(s):    Received Vancomycin 1750 mg x 1  Random level resulted ~10 hours after at 17.5 mcg/mL  Goal 15-20 mcg/mL  BRENDAN improving with Cr 3.2>2.8 mg/dL (baseline 0.8-1.2)    Vancomycin Regimen Plan:    Vancomycin 500 mg once today to maintain concentration goal  Obtain random level with AM labs on 10/7    Drug levels (last 3 results):  Recent Labs   Lab Result Units 10/06/22  0236   Vancomycin, Random ug/mL 17.5       Pharmacy will continue to follow and monitor vancomycin.    Please contact pharmacy at extension [phone] for questions regarding this assessment.    Thank you for the consult,   Esme Baptiste       Patient brief summary:  Kamar Muñoz is a 77 y.o. male initiated on antimicrobial therapy with IV Vancomycin for treatment of sepsis    Drug Allergies:   Review of patient's allergies indicates:   Allergen Reactions    Iodine      Other reaction(s): swelling  Other reaction(s): Itching  Other reaction(s): Rash       Actual Body Weight:   86.2 kg    Renal Function:   Estimated Creatinine Clearance: 24.9 mL/min (A) (based on SCr of 2.8 mg/dL (H)).,       CBC (last 72 hours):  Recent Labs   Lab Result Units 10/05/22  1505 10/05/22  1724 10/06/22  0236   WBC K/uL 12.61  --  16.39*   Hemoglobin g/dL 13.4*  --  11.7*   Hemoglobin A1C %  --  10.6*  --    Hematocrit % 43.6  --  35.2*   Platelets K/uL 200  --  186   Gran % % 82.9*  --  79.0*   Lymph % % 11.2*  --  7.7*   Mono % % 4.8  --  11.7   Eosinophil % % 0.2  --  0.1   Basophil % % 0.2  --  0.3   Differential Method  Automated  --  Automated       Metabolic Panel (last 72 hours):  Recent Labs   Lab Result Units 10/05/22  1505 10/05/22  1724 10/05/22  1837 10/05/22  1943/22  1946 10/05/22  2151 10/05/22  2211 10/05/22  2356 10/06/22  0112 10/06/22  0236   Sodium mmol/L 128* 127* 127*  --   --   --   --  131*  --  136   Potassium mmol/L 6.3* 6.5* 5.6*  --   --   --    --  4.1  --  4.4   Chloride mmol/L 88* 88* 89*  --   --   --   --  96  --  100   CO2 mmol/L 9* <5* <5*  --   --   --   --  14*  --  21*   Glucose mg/dL 1,042* 1,110* 1,109*  1,109*  --  1,068*  --  837* 683* 598* 476*   Glucose, UA   --   --   --   --   --  3+*  --   --   --   --    BUN mg/dL 61* 61* 61*  --   --   --   --  57*  --  58*   Creatinine mg/dL 3.1* 3.1* 3.2*  --   --   --   --  2.8*  --  2.8*   Albumin g/dL 3.2*  --   --   --   --   --   --   --   --   --    Total Bilirubin mg/dL 0.6  --   --   --   --   --   --   --   --   --    Alkaline Phosphatase U/L 148*  --   --   --   --   --   --   --   --   --    AST U/L 32  --   --   --   --   --   --   --   --   --    ALT U/L 35  --   --   --   --   --   --   --   --   --    Magnesium mg/dL  --  2.1 2.1  --   --   --   --   --   --  2.0   Phosphorus mg/dL  --  10.0* 10.1* 9.2*  --   --   --  4.4  --  3.5       Vancomycin Administrations:  vancomycin given in the last 96 hours                     vancomycin 1.75 g in 5 % dextrose 500 mL IVPB (mg) 1,750 mg New Bag 10/05/22 9326                    Microbiologic Results:  Microbiology Results (last 7 days)       Procedure Component Value Units Date/Time    Blood culture x two cultures. Draw prior to antibiotics. [[id number]] Collected: 10/05/22 6168    Order Status: Completed Specimen: Blood from Peripheral, Forearm, Left Updated: 10/06/22 0240     Blood Culture, Routine Gram stain kasey bottle: Gram positive rods      Results called to and read back by:Mini Muniz RN 10/06/2022 02:39    Narrative:      Aerobic and anaerobic    Urine culture [[id number]] Collected: 10/05/22 2151    Order Status: No result Specimen: Urine Updated: 10/06/22 0011    Blood culture x two cultures. Draw prior to antibiotics. [[id number]] Collected: 10/05/22 1513    Order Status: Completed Specimen: Blood from Peripheral, Forearm, Right Updated: 10/05/22 2145     Blood Culture, Routine No Growth to date    Narrative:      Aerobic and  anaerobic    Blood culture #1 **CANNOT BE ORDERED STAT** [[id number]]     Order Status: Canceled Specimen: Blood     Blood culture #2 **CANNOT BE ORDERED STAT** [[id number]]     Order Status: Canceled Specimen: Blood

## 2022-10-06 NOTE — ASSESSMENT & PLAN NOTE
Creatinine 3.1 on admit, baseline around 1.1  Possible Etiologies:  - Likely pre-renal from dehydration and volume losses secondary to DKA    Recent Labs     10/05/22  1837 10/05/22  2356 10/06/22  0236   CREATININE 3.2* 2.8* 2.8*   BUN 61* 57* 58*     Estimated Creatinine Clearance: 24.9 mL/min (A) (based on SCr of 2.8 mg/dL (H)).    Plan:   -Fluid resuscitation  -Monitor urine output and serial BMP and adjust therapy as needed.   - Strict I&Os and daily weights   - Avoid nephrotoxic agents such as NSAIDs, gadolinium and IV radiocontrast.  - Renally dose meds to current GFR.  - Maintain MAP > 65.

## 2022-10-06 NOTE — RESIDENT HANDOFF
Handoff     Primary Team: Networked reference to record PCT  Room Number: 6080/6080 A     Patient Name: Kamar Muñoz MRN: [id number]     Date of Birth: [id number] Allergies: Iodine     Age: 77 y.o. Admit Date: 10/5/2022     Sex: male  BMI: Body mass index is 24.39 kg/m².     Code Status: DNR        Illness Level (current clinical status): Watcher - No    Reason for Admission: Diabetic ketoacidosis without coma associated with type 2 diabetes mellitus    Brief HPI (pertinent PMH and diagnosis or differential diagnosis):    77 y.o M/ w/ hx of stroke, T2DM(poorly controlled with hx of recurrent DKA), CAD, HLD, paroxysmal Afib, and seizures who presented to the ED via EMS from [facility] with concerns of nausea/vomiting, hypotension, hyperglycemia. Per daughter, he has been depressed and has had very poor oral intake since his wife passed away 4 weeks ago.  He has not been taking care of himself. He was seen by his PCP 3 days ago who adjusted his antidepressants dose recently. He also had a fall last Friday while he was getting dressed that was described as mild. It was not associated with head trauma, as most of fall was on his elbow.         Hospital Course (updated, brief assessment by system or problem, significant events): Weaned off vasopressors. DKA resolved, lactic acidosis resolved. Insulin infusion transitioned to scheduled insulin. BRENDAN improving. He is more alert and oriented and appears back to his baseline. Urinalysis and leukocytosis concerning for infection. Blood cultures grew gram positive rods.    Tasks (specific, using if-then statements):   -Followup Blood cultures, urine cultures. Deescalate antibiotics  -BRENDAN- trend renal function.   -PT/OT consulted  -GI bleed- monitor Hgb, further workup with possible GI consult if concerning  -Lacosamide levels pending  -Elevated HgbA1c- will likely benefit from inpatient vs. Outpatient Endocrinology followup       Discharge Disposition: Nursing  Facility    Mentored By: Dr. Jason Mckeon

## 2022-10-06 NOTE — ASSESSMENT & PLAN NOTE
Microbiology Results (last 7 days)     Procedure Component Value Units Date/Time    Blood culture x two cultures. Draw prior to antibiotics. [[id number]] Collected: 10/05/22 1505    Order Status: Completed Specimen: Blood from Peripheral, Forearm, Left Updated: 10/06/22 0240     Blood Culture, Routine Gram stain kasey bottle: Gram positive rods      Results called to and read back by:Mini Muniz RN 10/06/2022 02:39    Narrative:      Aerobic and anaerobic    Urine culture [[id number]] Collected: 10/05/22 2151    Order Status: No result Specimen: Urine Updated: 10/06/22 0011    Blood culture x two cultures. Draw prior to antibiotics. [[id number]] Collected: 10/05/22 1513    Order Status: Completed Specimen: Blood from Peripheral, Forearm, Right Updated: 10/05/22 2145     Blood Culture, Routine No Growth to date    Narrative:      Aerobic and anaerobic    Blood culture #1 **CANNOT BE ORDERED STAT** [[id number]]     Order Status: Canceled Specimen: Blood     Blood culture #2 **CANNOT BE ORDERED STAT** [[id number]]     Order Status: Canceled Specimen: Blood         Recent Labs     10/05/22  1505 10/06/22  0236   WBC 12.61 16.39*     -CXR nonacute. CT abdomen concerning for stercoral colitis.  -Differential diagnosis includes shock as he is requiring pressors vs. Contaminants.    PLAN  -Followup repeat blood cultures and urine cultures. Deescalate antibiotics as necessitated

## 2022-10-07 PROBLEM — R65.21 SEPTIC SHOCK: Status: ACTIVE | Noted: 2022-02-20

## 2022-10-07 LAB
ALBUMIN SERPL BCP-MCNC: 2.4 G/DL (ref 3.5–5.2)
ALP SERPL-CCNC: 101 U/L (ref 55–135)
ALT SERPL W/O P-5'-P-CCNC: 23 U/L (ref 10–44)
ANION GAP SERPL CALC-SCNC: 9 MMOL/L (ref 8–16)
AST SERPL-CCNC: 30 U/L (ref 10–40)
B-OH-BUTYR BLD STRIP-SCNC: 0.2 MMOL/L (ref 0–0.5)
BACTERIA UR CULT: NORMAL
BACTERIA UR CULT: NORMAL
BASOPHILS # BLD AUTO: 0.03 K/UL (ref 0–0.2)
BASOPHILS NFR BLD: 0.2 % (ref 0–1.9)
BILIRUB SERPL-MCNC: 0.4 MG/DL (ref 0.1–1)
BUN SERPL-MCNC: 31 MG/DL (ref 8–23)
CALCIUM SERPL-MCNC: 8.6 MG/DL (ref 8.7–10.5)
CHLORIDE SERPL-SCNC: 107 MMOL/L (ref 95–110)
CO2 SERPL-SCNC: 22 MMOL/L (ref 23–29)
CREAT SERPL-MCNC: 1.3 MG/DL (ref 0.5–1.4)
DIFFERENTIAL METHOD: ABNORMAL
EOSINOPHIL # BLD AUTO: 0.1 K/UL (ref 0–0.5)
EOSINOPHIL NFR BLD: 0.5 % (ref 0–8)
ERYTHROCYTE [DISTWIDTH] IN BLOOD BY AUTOMATED COUNT: 11.6 % (ref 11.5–14.5)
EST. GFR  (NO RACE VARIABLE): 55.9 ML/MIN/1.73 M^2
GLUCOSE SERPL-MCNC: 112 MG/DL (ref 70–110)
HCT VFR BLD AUTO: 34.2 % (ref 40–54)
HGB BLD-MCNC: 11 G/DL (ref 14–18)
IMM GRANULOCYTES # BLD AUTO: 0.06 K/UL (ref 0–0.04)
IMM GRANULOCYTES NFR BLD AUTO: 0.5 % (ref 0–0.5)
LACOSAMIDE: 4 MCG/ML (ref 1–10)
LACTATE SERPL-SCNC: 1.3 MMOL/L (ref 0.5–2.2)
LACTATE SERPL-SCNC: 1.7 MMOL/L (ref 0.5–2.2)
LACTATE SERPL-SCNC: 2.9 MMOL/L (ref 0.5–2.2)
LACTATE SERPL-SCNC: 3.1 MMOL/L (ref 0.5–2.2)
LYMPHOCYTES # BLD AUTO: 1.5 K/UL (ref 1–4.8)
LYMPHOCYTES NFR BLD: 11.2 % (ref 18–48)
MAGNESIUM SERPL-MCNC: 1.9 MG/DL (ref 1.6–2.6)
MCH RBC QN AUTO: 29.6 PG (ref 27–31)
MCHC RBC AUTO-ENTMCNC: 32.2 G/DL (ref 32–36)
MCV RBC AUTO: 92 FL (ref 82–98)
MONOCYTES # BLD AUTO: 0.9 K/UL (ref 0.3–1)
MONOCYTES NFR BLD: 6.8 % (ref 4–15)
NEUTROPHILS # BLD AUTO: 10.6 K/UL (ref 1.8–7.7)
NEUTROPHILS NFR BLD: 80.8 % (ref 38–73)
NRBC BLD-RTO: 0 /100 WBC
PLATELET # BLD AUTO: 131 K/UL (ref 150–450)
PMV BLD AUTO: 11.5 FL (ref 9.2–12.9)
POCT GLUCOSE: 154 MG/DL (ref 70–110)
POCT GLUCOSE: 223 MG/DL (ref 70–110)
POCT GLUCOSE: 58 MG/DL (ref 70–110)
POCT GLUCOSE: 61 MG/DL (ref 70–110)
POCT GLUCOSE: 66 MG/DL (ref 70–110)
POCT GLUCOSE: 84 MG/DL (ref 70–110)
POCT GLUCOSE: 95 MG/DL (ref 70–110)
POCT GLUCOSE: 97 MG/DL (ref 70–110)
POTASSIUM SERPL-SCNC: 3.7 MMOL/L (ref 3.5–5.1)
PROT SERPL-MCNC: 5.6 G/DL (ref 6–8.4)
RBC # BLD AUTO: 3.71 M/UL (ref 4.6–6.2)
SODIUM SERPL-SCNC: 138 MMOL/L (ref 136–145)
VANCOMYCIN SERPL-MCNC: 11.8 UG/ML
WBC # BLD AUTO: 13.14 K/UL (ref 3.9–12.7)

## 2022-10-07 PROCEDURE — 11000001 HC ACUTE MED/SURG PRIVATE ROOM

## 2022-10-07 PROCEDURE — 36415 COLL VENOUS BLD VENIPUNCTURE: CPT | Performed by: INTERNAL MEDICINE

## 2022-10-07 PROCEDURE — 99222 1ST HOSP IP/OBS MODERATE 55: CPT | Mod: GC,,, | Performed by: GENERAL ACUTE CARE HOSPITAL

## 2022-10-07 PROCEDURE — 25000003 PHARM REV CODE 250: Performed by: INTERNAL MEDICINE

## 2022-10-07 PROCEDURE — 82010 KETONE BODYS QUAN: CPT | Performed by: STUDENT IN AN ORGANIZED HEALTH CARE EDUCATION/TRAINING PROGRAM

## 2022-10-07 PROCEDURE — 97530 THERAPEUTIC ACTIVITIES: CPT

## 2022-10-07 PROCEDURE — 80053 COMPREHEN METABOLIC PANEL: CPT | Performed by: INTERNAL MEDICINE

## 2022-10-07 PROCEDURE — 80202 ASSAY OF VANCOMYCIN: CPT | Performed by: INTERNAL MEDICINE

## 2022-10-07 PROCEDURE — 99222 PR INITIAL HOSPITAL CARE,LEVL II: ICD-10-PCS | Mod: GC,,, | Performed by: GENERAL ACUTE CARE HOSPITAL

## 2022-10-07 PROCEDURE — 25000003 PHARM REV CODE 250: Performed by: STUDENT IN AN ORGANIZED HEALTH CARE EDUCATION/TRAINING PROGRAM

## 2022-10-07 PROCEDURE — 85025 COMPLETE CBC W/AUTO DIFF WBC: CPT | Performed by: STUDENT IN AN ORGANIZED HEALTH CARE EDUCATION/TRAINING PROGRAM

## 2022-10-07 PROCEDURE — 36415 COLL VENOUS BLD VENIPUNCTURE: CPT | Performed by: PHYSICIAN ASSISTANT

## 2022-10-07 PROCEDURE — 36415 COLL VENOUS BLD VENIPUNCTURE: CPT | Performed by: STUDENT IN AN ORGANIZED HEALTH CARE EDUCATION/TRAINING PROGRAM

## 2022-10-07 PROCEDURE — 83735 ASSAY OF MAGNESIUM: CPT | Performed by: STUDENT IN AN ORGANIZED HEALTH CARE EDUCATION/TRAINING PROGRAM

## 2022-10-07 PROCEDURE — 97116 GAIT TRAINING THERAPY: CPT

## 2022-10-07 PROCEDURE — 83605 ASSAY OF LACTIC ACID: CPT | Mod: 91 | Performed by: PHYSICIAN ASSISTANT

## 2022-10-07 PROCEDURE — 63600175 PHARM REV CODE 636 W HCPCS: Performed by: INTERNAL MEDICINE

## 2022-10-07 PROCEDURE — 83605 ASSAY OF LACTIC ACID: CPT | Mod: 91 | Performed by: STUDENT IN AN ORGANIZED HEALTH CARE EDUCATION/TRAINING PROGRAM

## 2022-10-07 PROCEDURE — 63600175 PHARM REV CODE 636 W HCPCS: Performed by: STUDENT IN AN ORGANIZED HEALTH CARE EDUCATION/TRAINING PROGRAM

## 2022-10-07 PROCEDURE — 97161 PT EVAL LOW COMPLEX 20 MIN: CPT

## 2022-10-07 PROCEDURE — 97165 OT EVAL LOW COMPLEX 30 MIN: CPT

## 2022-10-07 PROCEDURE — 97535 SELF CARE MNGMENT TRAINING: CPT

## 2022-10-07 RX ORDER — LIDOCAINE 50 MG/G
1 PATCH TOPICAL
Status: DISCONTINUED | OUTPATIENT
Start: 2022-10-07 | End: 2022-10-11 | Stop reason: HOSPADM

## 2022-10-07 RX ORDER — INSULIN ASPART 100 [IU]/ML
8-16 INJECTION, SOLUTION INTRAVENOUS; SUBCUTANEOUS
Status: DISCONTINUED | OUTPATIENT
Start: 2022-10-07 | End: 2022-10-07

## 2022-10-07 RX ORDER — OXYCODONE HYDROCHLORIDE 5 MG/1
5 TABLET ORAL EVERY 6 HOURS PRN
Status: DISCONTINUED | OUTPATIENT
Start: 2022-10-07 | End: 2022-10-11 | Stop reason: HOSPADM

## 2022-10-07 RX ORDER — INSULIN ASPART 100 [IU]/ML
8-12 INJECTION, SOLUTION INTRAVENOUS; SUBCUTANEOUS
Status: DISCONTINUED | OUTPATIENT
Start: 2022-10-07 | End: 2022-10-11 | Stop reason: HOSPADM

## 2022-10-07 RX ORDER — LOPERAMIDE HYDROCHLORIDE 2 MG/1
2 CAPSULE ORAL 4 TIMES DAILY PRN
Status: DISCONTINUED | OUTPATIENT
Start: 2022-10-07 | End: 2022-10-11 | Stop reason: HOSPADM

## 2022-10-07 RX ORDER — INSULIN ASPART 100 [IU]/ML
8-12 INJECTION, SOLUTION INTRAVENOUS; SUBCUTANEOUS
Status: DISCONTINUED | OUTPATIENT
Start: 2022-10-07 | End: 2022-10-07

## 2022-10-07 RX ORDER — INSULIN ASPART 100 [IU]/ML
0-4 INJECTION, SOLUTION INTRAVENOUS; SUBCUTANEOUS
Status: DISCONTINUED | OUTPATIENT
Start: 2022-10-07 | End: 2022-10-11 | Stop reason: HOSPADM

## 2022-10-07 RX ADMIN — GABAPENTIN 100 MG: 100 CAPSULE ORAL at 08:10

## 2022-10-07 RX ADMIN — INSULIN ASPART 8 UNITS: 100 INJECTION, SOLUTION INTRAVENOUS; SUBCUTANEOUS at 12:10

## 2022-10-07 RX ADMIN — CLOPIDOGREL 75 MG: 75 TABLET, FILM COATED ORAL at 08:10

## 2022-10-07 RX ADMIN — Medication 16 G: at 04:10

## 2022-10-07 RX ADMIN — PIPERACILLIN SODIUM AND TAZOBACTAM SODIUM 4.5 G: 4; .5 INJECTION, POWDER, LYOPHILIZED, FOR SOLUTION INTRAVENOUS at 12:10

## 2022-10-07 RX ADMIN — LOPERAMIDE HYDROCHLORIDE 2 MG: 2 CAPSULE ORAL at 03:10

## 2022-10-07 RX ADMIN — ATORVASTATIN CALCIUM 40 MG: 40 TABLET, FILM COATED ORAL at 08:10

## 2022-10-07 RX ADMIN — INSULIN ASPART 1 UNITS: 100 INJECTION, SOLUTION INTRAVENOUS; SUBCUTANEOUS at 09:10

## 2022-10-07 RX ADMIN — APIXABAN 5 MG: 5 TABLET, FILM COATED ORAL at 08:10

## 2022-10-07 RX ADMIN — LACOSAMIDE 100 MG: 50 TABLET, FILM COATED ORAL at 08:10

## 2022-10-07 RX ADMIN — PANTOPRAZOLE SODIUM 40 MG: 40 TABLET, DELAYED RELEASE ORAL at 08:10

## 2022-10-07 RX ADMIN — AMIODARONE HYDROCHLORIDE 200 MG: 200 TABLET ORAL at 08:10

## 2022-10-07 RX ADMIN — INSULIN DETEMIR 15 UNITS: 100 INJECTION, SOLUTION SUBCUTANEOUS at 08:10

## 2022-10-07 RX ADMIN — PIPERACILLIN SODIUM AND TAZOBACTAM SODIUM 4.5 G: 4; .5 INJECTION, POWDER, LYOPHILIZED, FOR SOLUTION INTRAVENOUS at 08:10

## 2022-10-07 RX ADMIN — INSULIN ASPART 10 UNITS: 100 INJECTION, SOLUTION INTRAVENOUS; SUBCUTANEOUS at 08:10

## 2022-10-07 RX ADMIN — LIDOCAINE 1 PATCH: 50 PATCH CUTANEOUS at 08:10

## 2022-10-07 RX ADMIN — OXYCODONE 5 MG: 5 TABLET ORAL at 08:10

## 2022-10-07 RX ADMIN — VANCOMYCIN HYDROCHLORIDE 1250 MG: 1.25 INJECTION, POWDER, LYOPHILIZED, FOR SOLUTION INTRAVENOUS at 01:10

## 2022-10-07 RX ADMIN — SERTRALINE HYDROCHLORIDE 50 MG: 50 TABLET ORAL at 08:10

## 2022-10-07 NOTE — CONSULTS
[facility] - [facility] Surg  Endocrinology  Diabetes Consult Note    Consult Requested by: Israel Escamilla MD   Reason for admit: Septic shock    HISTORY OF PRESENT ILLNESS:  Reason for Consult: Management of T2DM, Hyperglycemia, DKA on admission - resolved at time of endocrine consult    Surgical Procedure and Date: n/a    Diabetes diagnosis year: >20yrs ago    Home Diabetes Medications:  In a nursing home now. Last admission 5/2022 final endocrine recs from inpatient team for insulin regimen at OR were as follows:  - Levemir 6 units twice daily and on insulin aspart 8-16 units (please give 8 units if eats <25%, 12 units with 50 % intake and full dose if eats 100%),  in addition to moderate correction with with meals  -  Aspart 4 units PRN in place for nightly and in between meal snacks  *Fixed doses of insulin we have recommended are to cover meals containing 60 g of carbohydrate and it is very important that meals are consistently containing 60 g of carbs.  If patient exceed 60 g of carbohydrate per meal or consumes any carbohydrates between meals without insulin coverage this will likely again lead to significant hyperglycemia. In addition if he eats < 50 % of meal to only give half the recommended pre-meal dose.      How often checking glucose at home? >4 x day   BG readings on regimen: >200  Hypoglycemia on the regimen?  Yes  Missed doses on regimen?  No    Diabetes Complications include:     Hyperglycemia, Hypoglycemia , and Diabetic chronic kidney disease         Complicating diabetes co morbidities:   History of MI, History of CVA, and CKD    HPI:   Mr. Muñoz is a 78yo M w/ hx of stroke, ketosis prone T2DM (poorly controlled with recurrent DKA), CAD, HLD, paroxysmal Afib, and seizures who presented to the ED via EMS from [facility] with concerns of nausea/vomiting, hypotension, hyperglycemia. Per daughter, he has been depressed and has had very poor oral intake since his wife passed away 4  weeks ago.  He has not been taking care of himself. He was seen by his PCP 3 days prior to admission, who adjusted his anitdepressants dose. He also had a fall last Friday while he was getting dressed that was described as mild. It was not associated with head trauma, as most of fall was on his elbow.      ED course notable for hyperglycemia, keotacidosis, hypotension requiring vasopressors. He was admitted  to the MICU initially in septic shock, significant lactic acidosis, and DKA. Infectious workup revealed +Bcx. For DKA he was started on the intensive insulin protocol for DKA. He was transitioned to MDI on 10/6 and stepped down to hospital medicine. Endocrine was consulted for BG management.         Interval HPI:   Overnight events:  Eatin%  Nausea: No  Hypoglycemia and intervention: Yes  Fever: No  TPN and/or TF: No  If yes, type of TF/TPN and rate: n/a    PMH, PSH, FH, SH updated and reviewed     ROS:  Review of Systems   Constitutional:  Negative for chills and fever.   HENT:  Negative for congestion, trouble swallowing and voice change.    Eyes:  Negative for discharge and visual disturbance.   Respiratory:  Negative for cough and shortness of breath.    Cardiovascular:  Negative for chest pain, palpitations and leg swelling.   Gastrointestinal:  Negative for constipation, diarrhea, nausea and vomiting.   Genitourinary:  Negative for difficulty urinating and dysuria.   Musculoskeletal:  Positive for arthralgias. Negative for back pain.   Skin:  Negative for rash and wound.   Neurological:  Negative for dizziness and headaches.   Psychiatric/Behavioral:  Positive for confusion (overnight) and sleep disturbance.      Current Medications and/or Treatments Impacting Glycemic Control  Immunotherapy:    Immunosuppressants       None          Steroids:   Hormones (From admission, onward)      None          Pressors:    Autonomic Drugs (From admission, onward)      None          Hyperglycemia/Diabetes  Medications:   Antihyperglycemics (From admission, onward)      Start     Stop Route Frequency Ordered    10/08/22 0900  insulin detemir U-100 pen 6 Units         -- SubQ 2 times daily 10/07/22 1046    10/07/22 1645  insulin aspart U-100 pen 8-12 Units         -- SubQ 3 times daily with meals 10/07/22 1528    10/07/22 1145  insulin aspart U-100 pen 0-4 Units         -- SubQ As needed (PRN) 10/07/22 1046             PHYSICAL EXAMINATION:  Vitals:    10/07/22 1522   BP: (!) 160/77   Pulse: 73   Resp: 16   Temp: 97.8 °F (36.6 °C)     Body mass index is 24.39 kg/m².    Physical Exam  Vitals and nursing note reviewed.   Constitutional:       General: He is not in acute distress.     Appearance: He is ill-appearing.   HENT:      Head: Normocephalic.      Mouth/Throat:      Mouth: Mucous membranes are moist.   Eyes:      Conjunctiva/sclera: Conjunctivae normal.   Cardiovascular:      Rate and Rhythm: Normal rate.   Pulmonary:      Effort: Pulmonary effort is normal. No respiratory distress.   Abdominal:      General: There is no distension.      Palpations: Abdomen is soft.      Tenderness: There is no abdominal tenderness.   Musculoskeletal:      Cervical back: Normal range of motion and neck supple.      Right lower leg: No edema.      Left lower leg: No edema.   Skin:     General: Skin is warm and dry.      Findings: Bruising (b/l UE) present.   Neurological:      Mental Status: He is alert and oriented to person, place, and time.   Psychiatric:         Mood and Affect: Mood normal.         Behavior: Behavior normal.         Labs Reviewed and Include   Recent Labs   Lab 10/07/22  1015   *   CALCIUM 8.6*   ALBUMIN 2.4*   PROT 5.6*      K 3.7   CO2 22*      BUN 31*   CREATININE 1.3   ALKPHOS 101   ALT 23   AST 30   BILITOT 0.4     Lab Results   Component Value Date    WBC 13.14 (H) 10/07/2022    HGB 11.0 (L) 10/07/2022    HCT 34.2 (L) 10/07/2022    MCV 92 10/07/2022     (L) 10/07/2022     No  results for input(s): TSH, FREET4 in the last 168 hours.  Lab Results   Component Value Date    HGBA1C 10.6 (H) 10/05/2022       Nutritional status:   Body mass index is 24.39 kg/m².  Lab Results   Component Value Date    ALBUMIN 2.4 (L) 10/07/2022    ALBUMIN 3.2 (L) 10/05/2022    ALBUMIN 2.8 (L) 06/30/2022     No results found for: PREALBUMIN    Estimated Creatinine Clearance: 53.6 mL/min (based on SCr of 1.3 mg/dL).    Accu-Checks  Recent Labs     10/06/22  0929 10/06/22  1145 10/06/22  1603 10/06/22  2033 10/07/22  0728 10/07/22  1101 10/07/22  1611 10/07/22  1613 10/07/22  1634 10/07/22  1711   POCTGLUCOSE 168* 175* 129* 78 97 95 58* 66* 61* 84        ASSESSMENT and PLAN    * Septic shock  Septic shock on admission - likely trigger for DKA. Shock now resolved and pt was stepped down out of the ICU.  Management per primary      Diabetic ketoacidosis without coma associated with type 2 diabetes mellitus  P/w DKA and shock, initially requiring vasopressors   Suspect infection as trigger for DKA? Unclear source as +BCx suspected to be contaminant  DKA now resolved     Ketosis-prone diabetes mellitus  - BG highly labile - subQ regimen started initially with very large increase in doses compared to last endocrine recommended regimen in 5/2022    - Levemir 6 units twice daily - since pt received 15u Levemir this AM (10/7) will wait to [place] levemir 6 BID until tomorrow, 10/8  - Aspart 8-12 unitt based on % of meal eaten -- 8 units if patient eats < or =25%, 10 units if pt eats 50% and 12 units if patient eats >50%.  - Low dose correction scale prn AC and snacks with parameters to not administer aspart doses <4hrs apart    - Hypoglycemia protocol in place  - If blood glucose greater than 300, please ask patient not to eat food or drink anything other than water until correctional insulin has brought it back below 250  - **Please ensure patient receives their p.r.n. insulin aspart if they eat a snack with more than 30 g  of carbohydrate    ** Please notify Endocrine for any change and/or advance in diet**  ** Please call Endocrine for any BG related issues **      BRENDAN (acute kidney injury)  Cr on admission 3.1 with baseline 1.1  Improving, now near baseline  Careful with insulin titration due to impaired renal clearance         Plan discussed with patient, family, and RN at bedside.     Sanjuana Garcia MD  Endocrinology  [facility] Surg

## 2022-10-07 NOTE — PLAN OF CARE
Problem: Physical Therapy  Goal: Physical Therapy Goal  Description: Goals to be met by: 22     Patient will increase functional independence with mobility by performin. Supine to sit with MInimal Assistance  2. Sit to supine with Set-up O'Neals  3. Rolling to Left and Right with Modified O[place]als.  4. Sit to stand transfer with Maximum Assistance  5. Bed to chair transfer with Maximum Assistance using Rolling Walker  6. Lower extremity exercise program x 10 reps per handout, with assistance as needed    Outcome: Ongoing, Progressing   Pt progressing, appropriate goals established

## 2022-10-07 NOTE — NURSING TRANSFER
Nursing Transfer Note      10/6/2022     Reason patient is being transferred: step- down    Transfer To: 646 From: 6080    Transfer via bed    Transfer with cardiac monitoring    Transported by SOY Wei    Medicines sent: none    Any special needs or follow-up needed: monitor glucose    Chart send with patient: Yes    Notified: daughter    Patient reassessed at: 10/6/22, 1820      Upon arrival to floor: patient oriented to room, call bell in reach, and bed in lowest position.

## 2022-10-07 NOTE — ASSESSMENT & PLAN NOTE
- BG highly labile - subQ regimen started initially with very large increase in doses compared to last endocrine recommended regimen in 5/2022    - Levemir 6 units twice daily   - Aspart 8-12 unitt based on % of meal eaten -- 8 units if patient eats < or =25%, 10 units if pt eats 50% and 12 units if patient eats >50%.  - Low dose correction scale prn AC and snacks with parameters to not administer aspart doses <4hrs apart    - Hypoglycemia protocol in place  - If blood glucose greater than 300, please ask patient not to eat food or drink anything other than water until correctional insulin has brought it back below 250  - **Please ensure patient receives their p.r.n. insulin aspart if they eat a snack with more than 30 g of carbohydrate    ** Please notify Endocrine for any change and/or advance in diet**  ** Please call Endocrine for any BG related issues **

## 2022-10-07 NOTE — PROGRESS NOTES
Pharmacokinetic Assessment Follow Up: IV Vancomycin    Vancomycin serum concentration assessment(s):    The random level resulted in 11.8 mcg/mL (~13 hours post dose). The measurement is below the desired definitive target range of 15 to 20 mcg/mL.  Patient's Scr improved from 2.0 mg/dL to 1.3 mg/dL (baseline 0.8-1.2 mg/dL).     Vancomycin Regimen Plan:    Administer Vancomycin 1250 mg  IV x 1 dose.    Plan to have random level drawn on 10/8 at 1130.  Consider switching from pulse dosing to scheduled vancomycin dosing if patient's renal function continues to improve tomorrow.      Drug levels (last 3 results):  Recent Labs   Lab Result Units 10/06/22  0236 10/07/22  0232   Vancomycin, Random ug/mL 17.5 11.8       Pharmacy will continue to follow and monitor vancomycin.    Please contact pharmacy at extension [phone] for questions regarding this assessment.    Thank you for the consult,   Brianna Nesbitt       Patient brief summary:  Kamar Muñoz is a 77 y.o. male initiated on antimicrobial therapy with IV Vancomycin for treatment of sepsis    Drug Allergies:   Review of patient's allergies indicates:   Allergen Reactions    Iodine      Other reaction(s): swelling  Other reaction(s): Itching  Other reaction(s): Rash       Actual Body Weight:   86.2 kg    Renal Function:   Estimated Creatinine Clearance: 53.6 mL/min (based on SCr of 1.3 mg/dL).,       CBC (last 72 hours):  Recent Labs   Lab Result Units 10/05/22  1505 10/05/22  1724 10/06/22  0236 10/07/22  0232   WBC K/uL 12.61  --  16.39* 13.14*   Hemoglobin g/dL 13.4*  --  11.7* 11.0*   Hemoglobin A1C %  --  10.6*  --   --    Hematocrit % 43.6  --  35.2* 34.2*   Platelets K/uL 200  --  186 131*   Gran % % 82.9*  --  79.0* 80.8*   Lymph % % 11.2*  --  7.7* 11.2*   Mono % % 4.8  --  11.7 6.8   Eosinophil % % 0.2  --  0.1 0.5   Basophil % % 0.2  --  0.3 0.2   Differential Method  Automated  --  Automated Automated       Metabolic Panel (last 72 hours):  Recent Labs    Lab Result Units 10/05/22  1505 10/05/22  1724 10/05/22  1837 10/05/22  1943/22  1946 10/05/22  2151 10/05/22  2211 10/05/22  2356 10/06/22  0112 10/06/22  0236 10/06/22  1104 10/06/22  1245 10/07/22  0232 10/07/22  1015   Sodium mmol/L 128* 127* 127*  --   --   --   --  131*  --  136 140  --   --  138   Potassium mmol/L 6.3* 6.5* 5.6*  --   --   --   --  4.1  --  4.4 4.4  --   --  3.7   Chloride mmol/L 88* 88* 89*  --   --   --   --  96  --  100 106  --   --  107   CO2 mmol/L 9* <5* <5*  --   --   --   --  14*  --  21* 24  --   --  22*   Glucose mg/dL 1,042* 1,110* 1,109*  1,109*  --  1,068*  --  837* 683* 598* 476* 144*  --   --  112*   Glucose, UA   --   --   --   --   --  3+*  --   --   --   --   --  Negative  --   --    BUN mg/dL 61* 61* 61*  --   --   --   --  57*  --  58* 47*  --   --  31*   Creatinine mg/dL 3.1* 3.1* 3.2*  --   --   --   --  2.8*  --  2.8* 2.0*  --   --  1.3   Albumin g/dL 3.2*  --   --   --   --   --   --   --   --   --   --   --   --  2.4*   Total Bilirubin mg/dL 0.6  --   --   --   --   --   --   --   --   --   --   --   --  0.4   Alkaline Phosphatase U/L 148*  --   --   --   --   --   --   --   --   --   --   --   --  101   AST U/L 32  --   --   --   --   --   --   --   --   --   --   --   --  30   ALT U/L 35  --   --   --   --   --   --   --   --   --   --   --   --  23   Magnesium mg/dL  --  2.1 2.1  --   --   --   --   --   --  2.0  --   --  1.9  --    Phosphorus mg/dL  --  10.0* 10.1* 9.2*  --   --   --  4.4  --  3.5 3.0  --   --   --        Vancomycin Administrations:  vancomycin given in the last 96 hours                     vancomycin 500 mg in dextrose 5 % 100 mL IVPB (ready to mix system) (mg) 500 mg New Bag 10/06/22 1320    vancomycin 1.75 g in 5 % dextrose 500 mL IVPB (mg) 1,750 mg New Bag 10/05/22 1639                    Microbiologic Results:  Microbiology Results (last 7 days)       Procedure Component Value Units Date/Time    Blood culture x two cultures. Draw  prior to antibiotics. [[id number]]  (Abnormal) Collected: 10/05/22 1505    Order Status: Completed Specimen: Blood from Peripheral, Forearm, Left Updated: 10/07/22 0846     Blood Culture, Routine Gram stain kasey bottle: Gram positive rods      Results called to and read back by:Mini Muniz RN 10/06/2022 02:39      Gram stain aer bottle: Gram positive rods      Positive results previously called 10/06/2022  11:04      BACILLUS SPECIES  Organism is a probable contaminant      Narrative:      Aerobic and anaerobic    Urine culture [[id number]] Collected: 10/05/22 2151    Order Status: Completed Specimen: Urine Updated: 10/07/22 0413     Urine Culture, Routine Multiple organisms isolated. None in predominance.  Repeat if      clinically necessary.    Narrative:      Specimen Source->Urine    Blood culture x two cultures. Draw prior to antibiotics. [[id number]] Collected: 10/05/22 1513    Order Status: Completed Specimen: Blood from Peripheral, Forearm, Right Updated: 10/06/22 1612     Blood Culture, Routine No Growth to date      No Growth to date    Narrative:      Aerobic and anaerobic    Urine culture [[id number]] Collected: 10/06/22 1245    Order Status: No result Specimen: Urine Updated: 10/06/22 1549    Blood culture #1 **CANNOT BE ORDERED STAT** [[id number]]     Order Status: Canceled Specimen: Blood     Blood culture #2 **CANNOT BE ORDERED STAT** [[id number]]     Order Status: Canceled Specimen: Blood

## 2022-10-07 NOTE — NURSING
BS below 70 this evening. Given some apple juices, rechecked BS & still below 70. Given Prn glucose tabs. Notified MD via secure chat.

## 2022-10-07 NOTE — PLAN OF CARE
Problem: Occupational Therapy  Goal: Occupational Therapy Goal  Description: Goals to be met by: 10/21/22     Patient will increase functional independence with ADLs by performing:    UE Dressing with Contact Guard Assistance.  LE Dressing with Minimal Assistance.  Grooming while EOB with Supervision.  Toileting from bedside commode with Minimal Assistance for hygiene and clothing management.     Outcome: Ongoing, Progressing

## 2022-10-07 NOTE — ASSESSMENT & PLAN NOTE
Septic shock on admission - likely trigger for DKA. Shock now resolved and pt was stepped down out of the ICU.  Management per primary

## 2022-10-07 NOTE — PROGRESS NOTES
Therapy with Vancomycin complete and/or consult discontinued by provider.  Pharmacy will sign off, please re-consult as needed.     Thank you for the consult,   Ellie Dahl  [id number]

## 2022-10-07 NOTE — PROVIDER TRANSFER
[facility] Acceptance Note    Date of Admit: 10/5/2022  Date of Transfer / Stepdown: 10/6/2022  Martina, C/J, L, Onc (IV chemo w/in 1 month), Gyn/Onc, or other special case?: no   ICU team stepping patient down: MICU  ICU team member giving verbal handoff: Earnest Hill,    Accepting  team: Hosp Med R    Brief History of Present Illness:      Kamar Muñoz is a 77 y.o M/ w/ hx of stroke, T2DM(poorly controlled with hx of recurrent DKA), CAD, HLD, paroxysmal Afib, and seizures who presented to the ED via EMS from [facility] with concerns of nausea/vomiting, hypotension, hyperglycemia. Per daughter, he has been depressed and has had very poor oral intake since his wife passed away 4 weeks ago.  He has not been taking care of himself. He was seen by his PCP 3 days ago who adjusted his antidepressants dose recently. He also had a fall last Friday while he was getting dressed that was described as mild. It was not associated with head trauma, as most of fall was on his elbow.     Hospital/ICU Course:     Weaned off vasopressors. DKA resolved, lactic acidosis resolved. Insulin infusion transitioned to scheduled insulin. BRENDAN improving. He is more alert and oriented and appears back to his baseline. Urinalysis and leukocytosis concerning for infection. Blood cultures grew gram positive rods.      Consultants and Procedures:     Consultants:  Nutrition    Procedures:    None    Transfer Information:     Diet:  Diabetic Diet     Physical Activity:  PT/OT    To Do / Pending Studies / Follow ups:    -Followup Blood cultures, urine cultures. Deescalate antibiotics  -BRENDAN- trend renal function.   -PT/OT consulted  -GI bleed- monitor Hgb, further workup with possible GI consult if concerning  -Lacosamide levels pending  -Elevated HgbA1c- will likely benefit from inpatient vs. Outpatient Endocrinology followup     [facility], who will assume  care of the patient upon arrival to the floor from the ICU. Please contact ICU team with any concerns prior to arrival. Please contact Hospital Medicine at [phone] or [phone] (please do NOT leave a voicemail) when patient arrives to the floor.    Israel Escamilla MD  Hospital Medicine Staff  Pager: [phone]; Spectra: [phone]

## 2022-10-07 NOTE — ASSESSMENT & PLAN NOTE
P/w DKA and shock, initially requiring vasopressors   Suspect infection as trigger for DKA? Unclear source as +BCx suspected to be contaminant  DKA now resolved

## 2022-10-07 NOTE — PLAN OF CARE
Problem: Diabetic Ketoacidosis  Goal: Fluid and Electrolyte Balance with Absence of Ketosis  Outcome: Ongoing, Progressing     Problem: Adult Inpatient Plan of Care  Goal: Plan of Care Review  Outcome: Ongoing, Progressing  Goal: Patient-Specific Goal (Individualized)  Outcome: Ongoing, Progressing  Goal: Absence of Hospital-Acquired Illness or Injury  Outcome: Ongoing, Progressing  Goal: Optimal Comfort and Wellbeing  Outcome: Ongoing, Progressing  Goal: Readiness for Transition of Care  Outcome: Ongoing, Progressing     Problem: Diabetes Comorbidity  Goal: Blood Glucose Level Within Targeted Range  Outcome: Ongoing, Progressing     Problem: Fluid and Electrolyte Imbalance (Acute Kidney Injury/Impairment)  Goal: Fluid and Electrolyte Balance  Outcome: Ongoing, Progressing     Problem: Oral Intake Inadequate (Acute Kidney Injury/Impairment)  Goal: Optimal Nutrition Intake  Outcome: Ongoing, Progressing     Problem: Renal Function Impairment (Acute Kidney Injury/Impairment)  Goal: Effective Renal Function  Outcome: Ongoing, Progressing     Problem: Impaired Wound Healing  Goal: Optimal Wound Healing  Outcome: Ongoing, Progressing     Problem: Fall Injury Risk  Goal: Absence of Fall and Fall-Related Injury  Outcome: Ongoing, Progressing     Problem: Skin Injury Risk Increased  Goal: Skin Health and Integrity  Outcome: Ongoing, Progressing     Problem: Adjustment to Illness (Sepsis/Septic Shock)  Goal: Optimal Coping  Outcome: Ongoing, Progressing     Problem: Bleeding (Sepsis/Septic Shock)  Goal: Absence of Bleeding  Outcome: Ongoing, Progressing     Problem: Glycemic Control Impaired (Sepsis/Septic Shock)  Goal: Blood Glucose Level Within Desired Range  Outcome: Ongoing, Progressing     Problem: Infection Progression (Sepsis/Septic Shock)  Goal: Absence of Infection Signs and Symptoms  Outcome: Ongoing, Progressing     Problem: Nutrition Impaired (Sepsis/Septic Shock)  Goal: Optimal Nutrition Intake  Outcome:  Ongoing, Progressing

## 2022-10-07 NOTE — SUBJECTIVE & OBJECTIVE
Interval HPI:   Overnight events:  Eatin%  Nausea: No  Hypoglycemia and intervention: Yes  Fever: No  TPN and/or TF: No  If yes, type of TF/TPN and rate: n/a    PMH, PSH, FH, SH updated and reviewed     ROS:  Review of Systems   Constitutional:  Negative for chills and fever.   HENT:  Negative for congestion, trouble swallowing and voice change.    Eyes:  Negative for discharge and visual disturbance.   Respiratory:  Negative for cough and shortness of breath.    Cardiovascular:  Negative for chest pain, palpitations and leg swelling.   Gastrointestinal:  Negative for constipation, diarrhea, nausea and vomiting.   Genitourinary:  Negative for difficulty urinating and dysuria.   Musculoskeletal:  Positive for arthralgias. Negative for back pain.   Skin:  Negative for rash and wound.   Neurological:  Negative for dizziness and headaches.   Psychiatric/Behavioral:  Positive for confusion (overnight) and sleep disturbance.      Current Medications and/or Treatments Impacting Glycemic Control  Immunotherapy:    Immunosuppressants       None          Steroids:   Hormones (From admission, onward)      None          Pressors:    Autonomic Drugs (From admission, onward)      None          Hyperglycemia/Diabetes Medications:   Antihyperglycemics (From admission, onward)      Start     Stop Route Frequency Ordered    10/08/22 0900  insulin detemir U-100 pen 6 Units         -- SubQ 2 times daily 10/07/22 1046    10/07/22 1645  insulin aspart U-100 pen 8-12 Units         -- SubQ 3 times daily with meals 10/07/22 1528    10/07/22 1145  insulin aspart U-100 pen 0-4 Units         -- SubQ As needed (PRN) 10/07/22 1046             PHYSICAL EXAMINATION:  Vitals:    10/07/22 1522   BP: (!) 160/77   Pulse: 73   Resp: 16   Temp: 97.8 °F (36.6 °C)     Body mass index is 24.39 kg/m².    Physical Exam  Vitals and nursing note reviewed.   Constitutional:       General: He is not in acute distress.     Appearance: He is ill-appearing.    HENT:      Head: Normocephalic.      Mouth/Throat:      Mouth: Mucous membranes are moist.   Eyes:      Conjunctiva/sclera: Conjunctivae normal.   Cardiovascular:      Rate and Rhythm: Normal rate.   Pulmonary:      Effort: Pulmonary effort is normal. No respiratory distress.   Abdominal:      General: There is no distension.      Palpations: Abdomen is soft.      Tenderness: There is no abdominal tenderness.   Musculoskeletal:      Cervical back: Normal range of motion and neck supple.      Right lower leg: No edema.      Left lower leg: No edema.   Skin:     General: Skin is warm and dry.      Findings: Bruising (b/l UE) present.   Neurological:      Mental Status: He is alert and oriented to person, place, and time.   Psychiatric:         Mood and Affect: Mood normal.         Behavior: Behavior normal.

## 2022-10-07 NOTE — ASSESSMENT & PLAN NOTE
Cr on admission 3.1 with baseline 1.1  Improving, now near baseline  Careful with insulin titration due to impaired renal clearance

## 2022-10-07 NOTE — HPI
Reason for Consult: Management of T2DM, Hyperglycemia, DKA on admission - resolved at time of endocrine consult    Surgical Procedure and Date: n/a    Diabetes diagnosis year: >20yrs ago    Home Diabetes Medications:  In a nursing home now. Last admission 5/2022 final endocrine recs from inpatient team for insulin regimen at [facility] were as follows:  - Levemir 6 units twice daily and on insulin aspart 8-16 units (please give 8 units if eats <25%, 12 units with 50 % intake and full dose if eats 100%),  in addition to moderate correction with with meals  -  Aspart 4 units PRN in place for nightly and in between meal snacks  *Fixed doses of insulin we have recommended are to cover meals containing 60 g of carbohydrate and it is very important that meals are consistently containing 60 g of carbs.  If patient exceed 60 g of carbohydrate per meal or consumes any carbohydrates between meals without insulin coverage this will likely again lead to significant hyperglycemia. In addition if he eats < 50 % of meal to only give half the recommended pre-meal dose.      How often checking glucose at home? >4 x day   BG readings on regimen: >200  Hypoglycemia on the regimen?  Yes  Missed doses on regimen?  No    Diabetes Complications include:     Hyperglycemia, Hypoglycemia , and Diabetic chronic kidney disease         Complicating diabetes co morbidities:   History of MI, History of CVA, and CKD    HPI:   Mr. Muñoz is a 78yo M w/ hx of stroke, ketosis prone T2DM (poorly controlled with recurrent DKA), CAD, HLD, paroxysmal Afib, and seizures who presented to the ED via EMS from [facility] with concerns of nausea/vomiting, hypotension, hyperglycemia. Per daughter, he has been depressed and has had very poor oral intake since his wife passed away 4 weeks ago.  He has not been taking care of himself. He was seen by his PCP 3 days prior to admission, who adjusted his anitdepressants dose. He also had a fall last Friday  while he was getting dressed that was described as mild. It was not associated with head trauma, as most of fall was on his elbow.      ED course notable for hyperglycemia, keotacidosis, hypotension requiring vasopressors. He was admitted  to the MICU initially in septic shock, significant lactic acidosis, and DKA. Infectious workup revealed +Bcx. For DKA he was started on the intensive insulin protocol for DKA. He was transitioned to MDI on 10/6 and stepped down to hospital medicine. Endocrine was consulted for BG management.

## 2022-10-07 NOTE — PT/OT/SLP EVAL
"Occupational Therapy   Co-Evaluation  Co-treat with PT to accommodate pt activity tolerance and need for skilled hands for safe intervention.    Name: Kamar Muñoz  MRN: [id number]  Admitting Diagnosis:  Diabetic ketoacidosis without coma associated with type 2 diabetes mellitus  Recent Surgery: * No surgery found *      Recommendations:     Discharge Recommendations: nursing facility, skilled  Discharge Equipment Recommendations:  bedside commode, other (see comments) (pending progress)  Barriers to discharge:  None    Assessment:     Kamar Muñoz is a 77 y.o. male with a medical diagnosis of Diabetic ketoacidosis without coma associated with type 2 diabetes mellitus.  He presents with diabetic ketoacidosis without coma associated with type 2 diabetes mellitus. Performance deficits affecting function: weakness, impaired endurance, impaired self care skills, impaired sensation, impaired functional mobility, gait instability, decreased lower extremity function, impaired cognition, impaired balance, decreased safety awareness, pain, decreased ROM.      Rehab Prognosis: Good; patient would benefit from acute skilled OT services to address these deficits and reach maximum level of function.       Plan:     Patient to be seen 3 x/week to address the above listed problems via self-care/home management, therapeutic activities, therapeutic exercises, neuromuscular re-education  Plan of Care Expires: 11/06/22  Plan of Care Reviewed with: patient    Subjective     Chief Complaint: diabetic ketoacidosis without coma associated with type 2 diabetes mellitus  Patient/Family Comments/goals: "I like the food at [facility]    Occupational Profile:  Living Environment: Pt lives at [facility] SNF/nursing home.  Previous level of function: Pt reports independence at home with ADLs prior to admission  Equipment Used at Home:  walker, rolling, wheelchair  Assistance upon Discharge: SNF recommended upon " d/c    Pain/Comfort:  Pain Rating 1: 4/10  Location - Side 1: Left  Location - Orientation 1: generalized  Location 1: flank  Pain Addressed 1: Reposition, Distraction  Pain Rating Post-Intervention 1:  (not rated)    Patients cultural, spiritual, Mu-ism conflicts given the current situation: no    Objective:     Communicated with: RN prior to session.  Patient found HOB elevated with peripheral IV upon OT entry to room.    General Precautions: Standard, fall   Orthopedic Precautions:N/A   Braces: N/A  Respiratory Status: Room air    Occupational Performance:    Bed Mobility:    Patient completed Rolling/Turning to Left with  moderate assistance  Patient completed Rolling/Turning to Right with moderate assistance  Patient completed Supine to Sit with maximal assistance  Patient completed Sit to Supine with stand by assistance    Functional Mobility/Transfers:  Patient completed Sit <> Stand Transfer with maximal assistance and of 2 persons  with  rolling walker   Functional Mobility: Pt sat EOB in preparation for ADLs with Min-Mod A. Pt with posterior lean seated EOB and in standing.    Activities of Daily Living:  Toileting: total assistance for pericare and brief management in bed    Cognitive/Visual Perceptual:  Cognitive/Psychosocial Skills:     -       Oriented to: Person, Place, and needed cues for time     Physical Exam:  Upper Extremity Range of Motion:     -       Right Upper Extremity: WFL  -       Left Upper Extremity: WFL  Upper Extremity Strength:    -       Right Upper Extremity: WFL  -       Left Upper Extremity: WFL   Strength:    -       Right Upper Extremity: WFL  -       Left Upper Extremity: WFL      AMPAC 6 Click ADL:  AMPAC Total Score: 14    Treatment & Education:  Pt educated on OT POC. Pt educated on use of call bell for assistance, pt verbalized understanding.    Patient left HOB elevated with all lines intact, call button in reach, and RN notified    GOALS:   Multidisciplinary  Problems       Occupational Therapy Goals          Problem: Occupational Therapy    Goal Priority Disciplines Outcome Interventions   Occupational Therapy Goal     OT, PT/OT Ongoing, Progressing    Description: Goals to be met by: 10/21/22     Patient will increase functional independence with ADLs by performing:    UE Dressing with Contact Guard Assistance.  LE Dressing with Minimal Assistance.  Grooming while EOB with Supervision.  Toileting from bedside commode with Minimal Assistance for hygiene and clothing management.                          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    Hyperlipidemia     Hypertension     Kidney stone     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 08/02/2012    STEMI involving right coronary artery 01/09/2022    Type II or unspecified type diabetes mellitus with neurological manifestations, uncontrolled(250.62) 03/08/2013    Urinary tract infection          Past Surgical History:   Procedure Laterality Date    BACK SURGERY      CATARACT EXTRACTION W/  INTRAOCULAR LENS IMPLANT Right     Per Dr Romero note 11/2018    COLONOSCOPY N/A 1/28/2019    Procedure: COLONOSCOPY Suprep;  Surgeon: Anh Johnson MD;  Location: [facility] ENDO;  Service: Endoscopy;  Laterality: N/A;    ESOPHAGOGASTRODUODENOSCOPY N/A 9/15/2022    Procedure: EGD (ESOPHAGOGASTRODUODENOSCOPY);  Surgeon: Dashawn Evans MD;  Location: [facility] ENDO;  Service: Endoscopy;  Laterality: N/A;    EYE SURGERY      HERNIA REPAIR      LEFT HEART CATHETERIZATION Left 1/9/2022    Procedure: CATHETERIZATION, HEART, LEFT;  Surgeon: Will Hurst III, MD;  Location: [facility] CATH LAB/EP;  Service: Cardiology;  Laterality: Left;    renal stones      SHOULDER OPEN ROTATOR CUFF REPAIR         Time Tracking:     OT Date of Treatment:  10/07/22  OT Start Time: 1132  OT Stop Time: 1200  OT Total Time (min): 28 min  PT present throughout eval    Billable Minutes:Evaluation 5  Self Care/Home Management 13  Therapeutic Activity 10    10/7/2022

## 2022-10-07 NOTE — PT/OT/SLP EVAL
Physical Therapy Co Evaluation and Tx    Patient Name:  Kamar Muñoz   MRN:  [id number]  Admit Date: 10/5/2022  Admitting Diagnosis:  Septic shock  Length of Stay: 2 days  Recent Surgery: * No surgery found *    *Co treatment of 2 skilled therapists indicated in order to maximize function and safety of Pt.  Recommendations:   Discharge Recommendations:  nursing facility, skilled, other (see comments) (return to)   Discharge Equipment Recommendations: bedside commode, other (see comments) (tbd)   Barriers to discharge:  requires increased skilled assist      Assessment:     Kamar Muñoz is a 77 y.o. male admitted with a medical diagnosis of Septic shock.  Pt presents with significant functional mobility deficits and is functioning below baseline. Pt presents from [facility]. Pt limited by confusion, weakness, and decreased coordination, but participated well in evaluation. Pt is a high fall risk at this time 2/2 significant posterior lean in sitting/standing. Pt will continue to benefit from acute PT services in order to maximize safety and (I) with functional mobility.    Problem List: weakness, impaired endurance, impaired self care skills, impaired functional mobility, gait instability, impaired balance, impaired cognition, decreased coordination, decreased lower extremity function, decreased safety awareness, pain, impaired coordination  Rehab Prognosis: Good; patient would benefit from acute skilled PT services to address these deficits and reach maximum level of function.        Plan:   During this hospitalization, patient to be seen 3 x/week to address the above listed problems via gait training, therapeutic activities, therapeutic exercises, neuromuscular re-education  Plan of Care Expires:  11/04/22    Subjective     Chief Complaint: Emesis (Arrives via EMS with c/o N/V, hypotension, and hyperglycemia )    Patient/Family Comments/goals: return to [facility]   Pain/Comfort:  Pain Rating 1:  4/10  Location - Side 1: Left  Location - Orientation 1: generalized  Location 1: flank  Pain Addressed 1: Reposition, Distraction  Pain Rating Post-Intervention 1: other (see comments) (not rated)    Social History:  Residence: Pt lives at [facility]/nursing home  Support available:  facility staff  Equipment Used: walker, rolling, wheelchair  Prior level of function: unclear; Pt reports mod(I) with RW at SNF    Objective:     Communicated with RN prior to session.  Patient found supine upon PT entry to room.   Additional staff present: OT    General Precautions: Standard, fall   Orthopedic Precautions:N/A   Braces: N/A   Oxygen Device: Room Air    Exams:  Cognition:   AxOx person, place, needed cues for time  Command following: Follows one-step verbal commands and Follows two-step verbal commands inconsistently    Postural Exam:  Patient presented with the following abnormalities:    -       Rounded shoulders  -       Forward head  -       Posterior pelvic tilt  Sensation:    -       Intact    RLE ROM: WFL except knee flexion contracture; suggesting Pt has been WC bound  RLE Strength: WFL  LLE ROM: WFL except WFL except knee flexion contracture; suggesting Pt has been WC bound  LLE Strength: WFL    Functional Mobility:  Bed Mobility:     Rolling Left:  moderate assistance  Rolling Right: moderate assistance  Supine to Sit: maximal assistance  Sit to Supine: stand by assistance  Transfers:     Sit to Stand:  maximal assistance x 2 with rolling walker  Gait:   Distance: ~3 ft  Level of Assistance:  maximal assistance x 2  AD used: rolling walker  Gait Deviations:decreased speed, step length, swing through, heel strike, forward flexed posture, and downward gaze.   Significant posterior lean throughout  Comments: PT providing verbal and tactile cues for improved upright posture, gaze direction, AD management, and gait fluidity.   PT providing verbal and tactile cues to improve anterior weight shift and  mechanics  Balance:   Static Sitting: ModA-CGA  Dynamic Sitting: ModA-CGA   Static Standing: MaxA  Dynamic Standing: MaxA x 2    Therapeutic Activities & Exercises:     Role of PT in POC  PT/OT performed total perianal care at beginning of evaluation.   Patient educated on the importance of early mobility to prevent functional decline during hospital stay  Patient was instructed to utilize staff assistance for mobility/transfers.  Patient was educated on PT POC and all questions answered within PT scope of practice.  Patient able to verbalize understanding; will follow-up with pt during current admit for additional questions/concerns within scope of practice.     Outcome Measures:  AM-PAC 6 CLICK MOBILITY  Turning over in bed (including adjusting bedclothes, sheets and blankets)?: 2  Sitting down on and standing up from a chair with arms (e.g., wheelchair, bedside commode, etc.): 2  Moving from lying on back to sitting on the side of the bed?: 2  Moving to and from a bed to a chair (including a wheelchair)?: 2  Need to walk in hospital room?: 2  Climbing 3-5 steps with a railing?: 1  Basic Mobility Total Score: 11     Patient left HOB elevated with all lines intact, call button in reach, bed alarm on, and RN notified.    GOALS:   Multidisciplinary Problems       Physical Therapy Goals          Problem: Physical Therapy    Goal Priority Disciplines Outcome Goal Variances Interventions   Physical Therapy Goal     PT, PT/OT Ongoing, Progressing     Description: Goals to be met by: 22     Patient will increase functional independence with mobility by performin. Supine to sit with MInimal Assistance  2. Sit to supine with Set-up Routt  3. Rolling to Left and Right with Modified Routt.  4. Sit to stand transfer with Maximum Assistance  5. Bed to chair transfer with Maximum Assistance using Rolling Walker  6. 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 2012    STEMI involving right coronary artery 2022    Type II or unspecified type diabetes mellitus with neurological manifestations, uncontrolled(250.62) 2013    Urinary tract infection        Past Surgical History:   Procedure Laterality Date    BACK SURGERY      CATARACT EXTRACTION W/  INTRAOCULAR LENS IMPLANT Right     Per Dr Romero note 2018    COLONOSCOPY N/A 2019    Procedure: COLONOSCOPY Suprep;  Surgeon: Anh Johnson MD;  Location: [facility] ENDO;  Service: Endoscopy;  Laterality: N/A;    ESOPHAGOGASTRODUODENOSCOPY N/A 9/15/2022    Procedure: EGD (ESOPHAGOGASTRODUODENOSCOPY);  Surgeon: Dashawn Evans MD;  Location: [facility] ENDO;  Service: Endoscopy;  Laterality: N/A;    EYE SURGERY      HERNIA REPAIR      LEFT HEART CATHETERIZATION Left 2022    Procedure: CATHETERIZATION, HEART, LEFT;  Surgeon: Will Hurst III, MD;  Location: [facility] CATH LAB/EP;  Service: Cardiology;  Laterality: Left;    renal stones      SHOULDER OPEN ROTATOR CUFF REPAIR         Family History   Problem Relation Age of Onset    Diabetes Father     Prostate cancer Neg Hx     Kidney disease Neg Hx        Social History     Socioeconomic History    Marital status:    Tobacco Use    Smoking status: Former     Packs/day: 1.50     Years: 25.00     Pack years: 37.50     Types: Cigarettes     Quit date: 1983     Years since quittin.7    Smokeless tobacco: Never   Substance and Sexual Activity    Alcohol use: No    Drug use: No    Sexual activity: Yes     Partners: Female   Social History Narrative    [employer]. . Wife is disabled  due to back problems.     Social Determinants of Health     Financial Resource Strain: Low Risk     Difficulty of Paying Living Expenses: Not hard at all   Food Insecurity: No Food Insecurity    Worried About Running Out of Food in the Last Year: Never true    Ran Out of Food in the Last Year: Never true   Transportation Needs: No Transportation Needs    Lack of Transportation (Medical): No    Lack of Transportation (Non-Medical): No   Housing Stability: Low Risk     Unable to Pay for Housing in the Last Year: No    Number of Places Lived in the Last Year: 1    Unstable Housing in the Last Year: No     Time Tracking:     PT Received On: 10/07/22  PT Start Time: 1132     PT Stop Time: 1204  PT Total Time (min): 32 min     Billable Minutes: Evaluation 9, Gait Training 8, and Therapeutic Activity 15    10/7/2022

## 2022-10-07 NOTE — ASSESSMENT & PLAN NOTE
Patient presents with hypotension, tachycardia, and WBC + pyuria.  This patient does have evidence of infective focus, present on admission  My overall impression is septic shock with multi organ dysfunction (BRENDAN, encephalopathy)  Source: urinary  Antibiotics given-   Antibiotics (From admission, onward)    Start     Stop Route Frequency Ordered    10/07/22 1230  vancomycin 1.25 g in dextrose 5% 250 mL IVPB (ready to mix)         -- IV Once 10/07/22 1125        Latest lactate reviewed-  Recent Labs   Lab 10/07/22  0232 10/07/22  0447 10/07/22  1015   LACTATE 1.3 1.7 3.1*     Organ dysfunction indicated by Acute kidney injury and encephalopathy    Shock with decreased perfusion noted, Fluid challenge  was given at 30cc/kg  - Blood pressures 70/36 after fluid resuscitation  - Perfusion exam was performed within 6 hours of septic shock presentation after bolus shows Adequate tissue perfusion assessed by non-invasive monitoring  - Pressors initiated on admission- Levophed for MAP of 65  - Source control achieved by: Vancomycin + Zosyn  - improved and weaned off pressors  - stepped down to floor for further management

## 2022-10-08 PROBLEM — R65.21 SEPTIC SHOCK: Status: RESOLVED | Noted: 2022-02-20 | Resolved: 2022-10-08

## 2022-10-08 PROBLEM — A41.9 SEPTIC SHOCK: Status: RESOLVED | Noted: 2022-02-20 | Resolved: 2022-10-08

## 2022-10-08 LAB
ALBUMIN SERPL BCP-MCNC: 2.3 G/DL (ref 3.5–5.2)
ALP SERPL-CCNC: 109 U/L (ref 55–135)
ALT SERPL W/O P-5'-P-CCNC: 22 U/L (ref 10–44)
ANION GAP SERPL CALC-SCNC: 9 MMOL/L (ref 8–16)
AST SERPL-CCNC: 28 U/L (ref 10–40)
B-OH-BUTYR BLD STRIP-SCNC: 0.1 MMOL/L (ref 0–0.5)
BACTERIA #/AREA URNS AUTO: ABNORMAL /HPF
BACTERIA BLD CULT: ABNORMAL
BACTERIA UR CULT: NORMAL
BACTERIA UR CULT: NORMAL
BASOPHILS # BLD AUTO: 0.02 K/UL (ref 0–0.2)
BASOPHILS NFR BLD: 0.3 % (ref 0–1.9)
BILIRUB SERPL-MCNC: 0.4 MG/DL (ref 0.1–1)
BILIRUB UR QL STRIP: NEGATIVE
BUN SERPL-MCNC: 21 MG/DL (ref 8–23)
CALCIUM SERPL-MCNC: 8.2 MG/DL (ref 8.7–10.5)
CHLORIDE SERPL-SCNC: 103 MMOL/L (ref 95–110)
CLARITY UR REFRACT.AUTO: CLEAR
CO2 SERPL-SCNC: 21 MMOL/L (ref 23–29)
COLOR UR AUTO: COLORLESS
CREAT SERPL-MCNC: 1 MG/DL (ref 0.5–1.4)
DIFFERENTIAL METHOD: ABNORMAL
EOSINOPHIL # BLD AUTO: 0.3 K/UL (ref 0–0.5)
EOSINOPHIL NFR BLD: 3.6 % (ref 0–8)
ERYTHROCYTE [DISTWIDTH] IN BLOOD BY AUTOMATED COUNT: 11.7 % (ref 11.5–14.5)
EST. GFR  (NO RACE VARIABLE): >60 ML/MIN/1.73 M^2
GLUCOSE SERPL-MCNC: 202 MG/DL (ref 70–110)
GLUCOSE UR QL STRIP: ABNORMAL
HCT VFR BLD AUTO: 35.6 % (ref 40–54)
HGB BLD-MCNC: 11.9 G/DL (ref 14–18)
HGB UR QL STRIP: NEGATIVE
IMM GRANULOCYTES # BLD AUTO: 0.02 K/UL (ref 0–0.04)
IMM GRANULOCYTES NFR BLD AUTO: 0.3 % (ref 0–0.5)
KETONES UR QL STRIP: NEGATIVE
LEUKOCYTE ESTERASE UR QL STRIP: NEGATIVE
LYMPHOCYTES # BLD AUTO: 1.3 K/UL (ref 1–4.8)
LYMPHOCYTES NFR BLD: 18.2 % (ref 18–48)
MAGNESIUM SERPL-MCNC: 1.7 MG/DL (ref 1.6–2.6)
MCH RBC QN AUTO: 30.1 PG (ref 27–31)
MCHC RBC AUTO-ENTMCNC: 33.4 G/DL (ref 32–36)
MCV RBC AUTO: 90 FL (ref 82–98)
MICROSCOPIC COMMENT: ABNORMAL
MONOCYTES # BLD AUTO: 0.6 K/UL (ref 0.3–1)
MONOCYTES NFR BLD: 8.4 % (ref 4–15)
NEUTROPHILS # BLD AUTO: 4.9 K/UL (ref 1.8–7.7)
NEUTROPHILS NFR BLD: 69.2 % (ref 38–73)
NITRITE UR QL STRIP: NEGATIVE
NRBC BLD-RTO: 0 /100 WBC
PH UR STRIP: 8 [PH] (ref 5–8)
PLATELET # BLD AUTO: 141 K/UL (ref 150–450)
PMV BLD AUTO: 10.4 FL (ref 9.2–12.9)
POCT GLUCOSE: 137 MG/DL (ref 70–110)
POCT GLUCOSE: 166 MG/DL (ref 70–110)
POCT GLUCOSE: 178 MG/DL (ref 70–110)
POCT GLUCOSE: 229 MG/DL (ref 70–110)
POTASSIUM SERPL-SCNC: 3.8 MMOL/L (ref 3.5–5.1)
PROT SERPL-MCNC: 5.4 G/DL (ref 6–8.4)
PROT UR QL STRIP: NEGATIVE
RBC # BLD AUTO: 3.96 M/UL (ref 4.6–6.2)
RBC #/AREA URNS AUTO: 2 /HPF (ref 0–4)
SODIUM SERPL-SCNC: 133 MMOL/L (ref 136–145)
SP GR UR STRIP: 1.01 (ref 1–1.03)
URN SPEC COLLECT METH UR: ABNORMAL
WBC # BLD AUTO: 7.04 K/UL (ref 3.9–12.7)
WBC #/AREA URNS AUTO: 13 /HPF (ref 0–5)
YEAST UR QL AUTO: ABNORMAL

## 2022-10-08 PROCEDURE — 25000003 PHARM REV CODE 250: Performed by: INTERNAL MEDICINE

## 2022-10-08 PROCEDURE — 82010 KETONE BODYS QUAN: CPT | Performed by: STUDENT IN AN ORGANIZED HEALTH CARE EDUCATION/TRAINING PROGRAM

## 2022-10-08 PROCEDURE — 81001 URINALYSIS AUTO W/SCOPE: CPT | Performed by: INTERNAL MEDICINE

## 2022-10-08 PROCEDURE — 11000001 HC ACUTE MED/SURG PRIVATE ROOM

## 2022-10-08 PROCEDURE — 99232 PR SUBSEQUENT HOSPITAL CARE,LEVL II: ICD-10-PCS | Mod: GC,,, | Performed by: GENERAL ACUTE CARE HOSPITAL

## 2022-10-08 PROCEDURE — 85025 COMPLETE CBC W/AUTO DIFF WBC: CPT | Performed by: STUDENT IN AN ORGANIZED HEALTH CARE EDUCATION/TRAINING PROGRAM

## 2022-10-08 PROCEDURE — 25000003 PHARM REV CODE 250: Performed by: STUDENT IN AN ORGANIZED HEALTH CARE EDUCATION/TRAINING PROGRAM

## 2022-10-08 PROCEDURE — 36415 COLL VENOUS BLD VENIPUNCTURE: CPT | Performed by: STUDENT IN AN ORGANIZED HEALTH CARE EDUCATION/TRAINING PROGRAM

## 2022-10-08 PROCEDURE — 99232 SBSQ HOSP IP/OBS MODERATE 35: CPT | Mod: ,,, | Performed by: INTERNAL MEDICINE

## 2022-10-08 PROCEDURE — 83735 ASSAY OF MAGNESIUM: CPT | Performed by: STUDENT IN AN ORGANIZED HEALTH CARE EDUCATION/TRAINING PROGRAM

## 2022-10-08 PROCEDURE — 99232 SBSQ HOSP IP/OBS MODERATE 35: CPT | Mod: GC,,, | Performed by: GENERAL ACUTE CARE HOSPITAL

## 2022-10-08 PROCEDURE — 99232 PR SUBSEQUENT HOSPITAL CARE,LEVL II: ICD-10-PCS | Mod: ,,, | Performed by: INTERNAL MEDICINE

## 2022-10-08 PROCEDURE — 80053 COMPREHEN METABOLIC PANEL: CPT | Performed by: INTERNAL MEDICINE

## 2022-10-08 RX ORDER — METHOCARBAMOL 500 MG/1
500 TABLET, FILM COATED ORAL 4 TIMES DAILY
Status: DISCONTINUED | OUTPATIENT
Start: 2022-10-08 | End: 2022-10-11 | Stop reason: HOSPADM

## 2022-10-08 RX ORDER — AMOXICILLIN AND CLAVULANATE POTASSIUM 875; 125 MG/1; MG/1
1 TABLET, FILM COATED ORAL 2 TIMES DAILY
Status: COMPLETED | OUTPATIENT
Start: 2022-10-08 | End: 2022-10-11

## 2022-10-08 RX ADMIN — METHOCARBAMOL 500 MG: 500 TABLET ORAL at 04:10

## 2022-10-08 RX ADMIN — INSULIN DETEMIR 6 UNITS: 100 INJECTION, SOLUTION SUBCUTANEOUS at 08:10

## 2022-10-08 RX ADMIN — APIXABAN 5 MG: 5 TABLET, FILM COATED ORAL at 08:10

## 2022-10-08 RX ADMIN — INSULIN ASPART 8 UNITS: 100 INJECTION, SOLUTION INTRAVENOUS; SUBCUTANEOUS at 12:10

## 2022-10-08 RX ADMIN — SERTRALINE HYDROCHLORIDE 50 MG: 50 TABLET ORAL at 08:10

## 2022-10-08 RX ADMIN — PANTOPRAZOLE SODIUM 40 MG: 40 TABLET, DELAYED RELEASE ORAL at 08:10

## 2022-10-08 RX ADMIN — GABAPENTIN 100 MG: 100 CAPSULE ORAL at 08:10

## 2022-10-08 RX ADMIN — AMOXICILLIN AND CLAVULANATE POTASSIUM 1 TABLET: 875; 125 TABLET, FILM COATED ORAL at 04:10

## 2022-10-08 RX ADMIN — METHOCARBAMOL 500 MG: 500 TABLET ORAL at 02:10

## 2022-10-08 RX ADMIN — CLOPIDOGREL 75 MG: 75 TABLET, FILM COATED ORAL at 08:10

## 2022-10-08 RX ADMIN — AMIODARONE HYDROCHLORIDE 200 MG: 200 TABLET ORAL at 08:10

## 2022-10-08 RX ADMIN — METHOCARBAMOL 500 MG: 500 TABLET ORAL at 08:10

## 2022-10-08 RX ADMIN — INSULIN ASPART 1 UNITS: 100 INJECTION, SOLUTION INTRAVENOUS; SUBCUTANEOUS at 08:10

## 2022-10-08 RX ADMIN — INSULIN ASPART 10 UNITS: 100 INJECTION, SOLUTION INTRAVENOUS; SUBCUTANEOUS at 04:10

## 2022-10-08 RX ADMIN — INSULIN ASPART 10 UNITS: 100 INJECTION, SOLUTION INTRAVENOUS; SUBCUTANEOUS at 08:10

## 2022-10-08 RX ADMIN — ATORVASTATIN CALCIUM 40 MG: 40 TABLET, FILM COATED ORAL at 08:10

## 2022-10-08 RX ADMIN — LOPERAMIDE HYDROCHLORIDE 2 MG: 2 CAPSULE ORAL at 08:10

## 2022-10-08 RX ADMIN — OXYCODONE 5 MG: 5 TABLET ORAL at 08:10

## 2022-10-08 RX ADMIN — LACOSAMIDE 100 MG: 50 TABLET, FILM COATED ORAL at 08:10

## 2022-10-08 RX ADMIN — LIDOCAINE 1 PATCH: 50 PATCH CUTANEOUS at 08:10

## 2022-10-08 NOTE — ASSESSMENT & PLAN NOTE
- EKG on admit sinus rhythm  - Continue home amiodarone, held home metoprolol due to hypotension. Will resume when clinically appropriate

## 2022-10-08 NOTE — ASSESSMENT & PLAN NOTE
Cr on admission 3.1 with baseline 1.1  Improving, now at baseline  Careful with insulin titration due to impaired renal clearance

## 2022-10-08 NOTE — SUBJECTIVE & OBJECTIVE
Interval HPI:   Overnight events: Hypoglycemic yesterday to 50s attributed to very high doses of detemir. New detemir regimen began today.  Eatin%  Nausea: No  Hypoglycemia and intervention: Yes  Fever: No  TPN and/or TF: No  If yes, type of TF/TPN and rate: n/a    /69 (BP Location: Right arm, Patient Position: Lying)   Pulse 69   Temp 97.7 °F (36.5 °C) (Oral)   Resp 16   Wt 86.2 kg (190 lb)   SpO2 (!) 93%   BMI 24.39 kg/m²     Labs Reviewed and Include    Recent Labs   Lab 10/08/22  0432   *   CALCIUM 8.2*   ALBUMIN 2.3*   PROT 5.4*   *   K 3.8   CO2 21*      BUN 21   CREATININE 1.0   ALKPHOS 109   ALT 22   AST 28   BILITOT 0.4     Lab Results   Component Value Date    WBC 7.04 10/08/2022    HGB 11.9 (L) 10/08/2022    HCT 35.6 (L) 10/08/2022    MCV 90 10/08/2022     (L) 10/08/2022     No results for input(s): TSH, FREET4 in the last 168 hours.  Lab Results   Component Value Date    HGBA1C 10.6 (H) 10/05/2022       Nutritional status:   Body mass index is 24.39 kg/m².  Lab Results   Component Value Date    ALBUMIN 2.3 (L) 10/08/2022    ALBUMIN 2.4 (L) 10/07/2022    ALBUMIN 3.2 (L) 10/05/2022     No results found for: PREALBUMIN    Estimated Creatinine Clearance: 69.6 mL/min (based on SCr of 1 mg/dL).    Accu-Checks  Recent Labs     10/07/22  0728 10/07/22  1101 10/07/22  1611 10/07/22  1613 10/07/22  1634 10/07/22  1711 10/07/22  1830 10/07/22  2034 10/08/22  0719 10/08/22  1104   POCTGLUCOSE 97 95 58* 66* 61* 84 154* 223* 229* 166*       Current Medications and/or Treatments Impacting Glycemic Control  Immunotherapy:    Immunosuppressants       None          Steroids:   Hormones (From admission, onward)      None          Pressors:    Autonomic Drugs (From admission, onward)      None          Hyperglycemia/Diabetes Medications:   Antihyperglycemics (From admission, onward)      Start     Stop Route Frequency Ordered    10/08/22 0900  insulin detemir U-100 pen 6 Units          -- SubQ 2 times daily 10/07/22 1046    10/07/22 1730  insulin aspart U-100 pen 8-12 Units         -- SubQ 3 times daily with meals 10/07/22 1715    10/07/22 1145  insulin aspart U-100 pen 0-4 Units         -- SubQ As needed (PRN) 10/07/22 1046

## 2022-10-08 NOTE — PLAN OF CARE
Given Prn back pain med & scheduled robaxin, prn antidiarrhea med. BM today.  Pt's misplaced his glasses this evening, called his son & but son stated that he didn't take glasses with him.  Unable to find it in the room. Called dietary & ask them to check for reading glasses in empty tray. Dietary verbalizes understanding & stated that would call if they find anything.           Problem: Diabetic Ketoacidosis  Goal: Fluid and Electrolyte Balance with Absence of Ketosis  Outcome: Ongoing, Progressing     Problem: Adult Inpatient Plan of Care  Goal: Plan of Care Review  Outcome: Ongoing, Progressing  Goal: Patient-Specific Goal (Individualized)  Outcome: Ongoing, Progressing  Goal: Absence of Hospital-Acquired Illness or Injury  Outcome: Ongoing, Progressing  Goal: Optimal Comfort and Wellbeing  Outcome: Ongoing, Progressing  Goal: Readiness for Transition of Care  Outcome: Ongoing, Progressing     Problem: Diabetes Comorbidity  Goal: Blood Glucose Level Within Targeted Range  Outcome: Ongoing, Progressing     Problem: Fluid and Electrolyte Imbalance (Acute Kidney Injury/Impairment)  Goal: Fluid and Electrolyte Balance  Outcome: Ongoing, Progressing     Problem: Oral Intake Inadequate (Acute Kidney Injury/Impairment)  Goal: Optimal Nutrition Intake  Outcome: Ongoing, Progressing     Problem: Renal Function Impairment (Acute Kidney Injury/Impairment)  Goal: Effective Renal Function  Outcome: Ongoing, Progressing     Problem: Impaired Wound Healing  Goal: Optimal Wound Healing  Outcome: Ongoing, Progressing     Problem: Fall Injury Risk  Goal: Absence of Fall and Fall-Related Injury  Outcome: Ongoing, Progressing     Problem: Skin Injury Risk Increased  Goal: Skin Health and Integrity  Outcome: Ongoing, Progressing     Problem: Adjustment to Illness (Sepsis/Septic Shock)  Goal: Optimal Coping  Outcome: Ongoing, Progressing     Problem: Bleeding (Sepsis/Septic Shock)  Goal: Absence of Bleeding  Outcome: Ongoing,  Progressing     Problem: Glycemic Control Impaired (Sepsis/Septic Shock)  Goal: Blood Glucose Level Within Desired Range  Outcome: Ongoing, Progressing     Problem: Infection Progression (Sepsis/Septic Shock)  Goal: Absence of Infection Signs and Symptoms  Outcome: Ongoing, Progressing     Problem: Nutrition Impaired (Sepsis/Septic Shock)  Goal: Optimal Nutrition Intake  Outcome: Ongoing, Progressing

## 2022-10-08 NOTE — PROGRESS NOTES
[facility] - Med Surg  Endocrinology  Progress Note    Admit Date: 10/5/2022     Reason for Consult: Management of T2DM, Hyperglycemia, DKA on admission - resolved at time of endocrine consult    Surgical Procedure and Date: n/a    Diabetes diagnosis year: >20yrs ago    Home Diabetes Medications:  In a nursing home now. Last admission 5/2022 final endocrine recs from inpatient team for insulin regimen at [facility] were as follows:  - Levemir 6 units twice daily and on insulin aspart 8-16 units (please give 8 units if eats <25%, 12 units with 50 % intake and full dose if eats 100%),  in addition to moderate correction with with meals  -  Aspart 4 units PRN in place for nightly and in between meal snacks  *Fixed doses of insulin we have recommended are to cover meals containing 60 g of carbohydrate and it is very important that meals are consistently containing 60 g of carbs.  If patient exceed 60 g of carbohydrate per meal or consumes any carbohydrates between meals without insulin coverage this will likely again lead to significant hyperglycemia. In addition if he eats < 50 % of meal to only give half the recommended pre-meal dose.      How often checking glucose at home? >4 x day   BG readings on regimen: >200  Hypoglycemia on the regimen?  Yes  Missed doses on regimen?  No    Diabetes Complications include:     Hyperglycemia, Hypoglycemia , and Diabetic chronic kidney disease         Complicating diabetes co morbidities:   History of MI, History of CVA, and CKD    HPI:   Mr. Muñoz is a 78yo M w/ hx of stroke, ketosis prone T2DM (poorly controlled with recurrent DKA), CAD, HLD, paroxysmal Afib, and seizures who presented to the ED via EMS from [facility] with concerns of nausea/vomiting, hypotension, hyperglycemia. Per daughter, he has been depressed and has had very poor oral intake since his wife passed away 4 weeks ago.  He has not been taking care of himself. He was seen by his PCP 3 days prior to  admission, who adjusted his anitdepressants dose. He also had a fall last Friday while he was getting dressed that was described as mild. It was not associated with head trauma, as most of fall was on his elbow.      ED course notable for hyperglycemia, keotacidosis, hypotension requiring vasopressors. He was admitted  to the MICU initially in septic shock, significant lactic acidosis, and DKA. Infectious workup revealed +Bcx. For DKA he was started on the intensive insulin protocol for DKA. He was transitioned to MDI on 10/6 and stepped down to hospital medicine. Endocrine was consulted for BG management.         Interval HPI:   Overnight events: Hypoglycemic yesterday to 50s attributed to very high doses of detemir. New detemir regimen began today.  Eatin%  Nausea: No  Hypoglycemia and intervention: Yes  Fever: No  TPN and/or TF: No  If yes, type of TF/TPN and rate: n/a    /69 (BP Location: Right arm, Patient Position: Lying)   Pulse 69   Temp 97.7 °F (36.5 °C) (Oral)   Resp 16   Wt 86.2 kg (190 lb)   SpO2 (!) 93%   BMI 24.39 kg/m²     Labs Reviewed and Include    Recent Labs   Lab 10/08/22  0432   *   CALCIUM 8.2*   ALBUMIN 2.3*   PROT 5.4*   *   K 3.8   CO2 21*      BUN 21   CREATININE 1.0   ALKPHOS 109   ALT 22   AST 28   BILITOT 0.4     Lab Results   Component Value Date    WBC 7.04 10/08/2022    HGB 11.9 (L) 10/08/2022    HCT 35.6 (L) 10/08/2022    MCV 90 10/08/2022     (L) 10/08/2022     No results for input(s): TSH, FREET4 in the last 168 hours.  Lab Results   Component Value Date    HGBA1C 10.6 (H) 10/05/2022       Nutritional status:   Body mass index is 24.39 kg/m².  Lab Results   Component Value Date    ALBUMIN 2.3 (L) 10/08/2022    ALBUMIN 2.4 (L) 10/07/2022    ALBUMIN 3.2 (L) 10/05/2022     No results found for: PREALBUMIN    Estimated Creatinine Clearance: 69.6 mL/min (based on SCr of 1 mg/dL).    Accu-Checks  Recent Labs     10/07/22  0728 10/07/22  1104  10/07/22  1611 10/07/22  1613 10/07/22  1634 10/07/22  1711 10/07/22  1830 10/07/22  2034 10/08/22  0719 10/08/22  1104   POCTGLUCOSE 97 95 58* 66* 61* 84 154* 223* 229* 166*       Current Medications and/or Treatments Impacting Glycemic Control  Immunotherapy:    Immunosuppressants       None          Steroids:   Hormones (From admission, onward)      None          Pressors:    Autonomic Drugs (From admission, onward)      None          Hyperglycemia/Diabetes Medications:   Antihyperglycemics (From admission, onward)      Start     Stop Route Frequency Ordered    10/08/22 0900  insulin detemir U-100 pen 6 Units         -- SubQ 2 times daily 10/07/22 1046    10/07/22 1730  insulin aspart U-100 pen 8-12 Units         -- SubQ 3 times daily with meals 10/07/22 1715    10/07/22 1145  insulin aspart U-100 pen 0-4 Units         -- SubQ As needed (PRN) 10/07/22 1046            ASSESSMENT and PLAN    * Septic shock-resolved as of 10/8/2022  Septic shock on admission - likely trigger for DKA. Shock now resolved and pt was stepped down out of the ICU.  Management per primary      Ketosis-prone diabetes mellitus  - BG highly labile - subQ regimen started initially with very large increase in doses compared to last endocrine recommended regimen in 5/2022  - AM fasting today in 200s after low yesterday. Will monitor pt on current regimen for now. Most recent BG 160s - at goal.    - Continue Levemir 6 units twice daily   - Continue Aspart 8-12 unitt based on % of meal eaten -- 8 units if patient eats < or =25%, 10 units if pt eats 50% and 12 units if patient eats >50%.  - Low dose correction scale prn AC and snacks with parameters to not administer aspart doses <4hrs apart    - Hypoglycemia protocol in place  - If blood glucose greater than 300, please ask patient not to eat food or drink anything other than water until correctional insulin has brought it back below 250  - **Please ensure patient receives their p.r.n. insulin  aspart if they eat a snack with more than 30 g of carbohydrate    ** Please notify Endocrine for any change and/or advance in diet**  ** Please call Endocrine for any BG related issues **      BRENDAN (acute kidney injury)  Cr on admission 3.1 with baseline 1.1  Improving, now at baseline  Careful with insulin titration due to impaired renal clearance     Diabetic ketoacidosis without coma associated with type 2 diabetes mellitus-resolved as of 10/8/2022  P/w DKA and shock, initially requiring vasopressors   Suspect infection as trigger for DKA? Unclear source as +BCx suspected to be contaminant  DKA now resolved         Sanjuana Garcia MD  Endocrinology  [facility]

## 2022-10-08 NOTE — PROGRESS NOTES
[facility] Medicine  Progress Note    Patient Name: Kamar Muñoz  MRN: [id number]  Patient Class: IP- Inpatient   Admission Date: 10/5/2022  Length of Stay: 3 days  Attending Physician: Israel Escamilla MD  Primary Care Provider: Basim Guerrero MD        Subjective:     Principal Problem:Septic shock        HPI:     Mr. Muñoz is a 77 y.o M/ w/ hx of stroke, T2DM(poorly controlled with recurrent DKA), CAD, HLD, paroxysmal Afib, and seizures who presented to the ED via EMS from [facility] with concerns of nausea/vomiting, hypotension, hyperglycemia. Per daughter, he has been depressed and has had very poor oral intake since his wife passed away 4 weeks ago.  He has not been taking care of himself. He was seen by his PCP 3 days ago who adjusted his anitdepressants dose recently. He also had a fall last Friday while he was getting dressed that was described as mild. It was not associated with head trauma, as most of fall was on his elbow.      ED course notable for hyperglycemia, keotacidosis, hypotension requiring vasopressors. Critical Care Medicine was consulted for evaluation.       Overview/Hospital Course:  No acute events overnight, afebrile. Weaned off vasopressors. DKA improving. He is more alert and oriented and appears back to his baseline.         Interval History: Patient lying in bed, no acute distress. Son at bedside. Alert and oriented x 3. Patient and son note fluctuations in patient's confusion and lucidness which primary team explained is likely related to infection and delirium. Completed 3 days of zosyn and repeat UA showed improvement in pyruia. Will continue UTI treatment with oral Augmentin since there were 13 urine WBC on UA. Denies fever, chills, dysuria, N/V or SOB. Does note lower back pain. On lidocaine patch and added robaxin. Endocrinology titrating insulin regimen with improved BG control. Ordered delirium precautions. Boost TID ordered  and PT/OT recommending SNF so primary team will work with  to determine if patient's insurance will approve discharge to [facility].      Review of Systems   Constitutional:  Positive for activity change and fatigue. Negative for chills and fever.   HENT:  Negative for congestion and trouble swallowing.    Eyes:  Negative for visual disturbance.   Respiratory:  Negative for cough and shortness of breath.    Cardiovascular:  Negative for chest pain and leg swelling.   Gastrointestinal:  Negative for abdominal distention, abdominal pain, nausea and vomiting.   Endocrine: Negative for cold intolerance, heat intolerance, polydipsia and polyuria.   Genitourinary:  Negative for difficulty urinating and dysuria.   Musculoskeletal:  Negative for back pain and myalgias.   Skin:  Negative for rash and wound.   Neurological:  Positive for weakness. Negative for dizziness and light-headedness.   Hematological:  Negative for adenopathy. Does not bruise/bleed easily.   Psychiatric/Behavioral:  Negative for confusion and sleep disturbance.    Objective:     Vital Signs (Most Recent):  Temp: 97.4 °F (36.3 °C) (10/08/22 1542)  Pulse: 66 (10/08/22 1542)  Resp: 16 (10/08/22 1542)  BP: (!) 162/85 (10/08/22 1542)  SpO2: (!) 94 % (10/08/22 1542)   Vital Signs (24h Range):  Temp:  [96.2 °F (35.7 °C)-98.6 °F (37 °C)] 97.4 °F (36.3 °C)  Pulse:  [65-77] 66  Resp:  [16-19] 16  SpO2:  [91 %-95 %] 94 %  BP: (117-170)/(63-85) 162/85     Weight: 86.2 kg (190 lb)  Body mass index is 24.39 kg/m².    Intake/Output Summary (Last 24 hours) at 10/8/2022 1727  Last data filed at 10/8/2022 1658  Gross per 24 hour   Intake 890 ml   Output 1050 ml   Net -160 ml        Physical Exam  Constitutional:       Appearance: He is well-developed.   HENT:      Head: Normocephalic and atraumatic.   Eyes:      General: No scleral icterus.     Pupils: Pupils are equal, round, and reactive to light.   Neck:      Vascular: No JVD.   Cardiovascular:      Rate and  Rhythm: Normal rate and regular rhythm.      Heart sounds: No murmur heard.    No friction rub. No gallop.   Pulmonary:      Effort: Pulmonary effort is normal. No respiratory distress.      Breath sounds: Normal breath sounds. No wheezing or rales.   Abdominal:      General: Bowel sounds are normal. There is no distension.      Palpations: Abdomen is soft.      Tenderness: There is no abdominal tenderness. There is no guarding or rebound.   Musculoskeletal:         General: No deformity. Normal range of motion.      Cervical back: Neck supple.   Lymphadenopathy:      Cervical: No cervical adenopathy.   Skin:     General: Skin is warm and dry.      Capillary Refill: Capillary refill takes less than 2 seconds.      Findings: No erythema or rash.   Neurological:      Mental Status: He is alert and oriented to person, place, and time.      Cranial Nerves: No cranial nerve deficit.      Sensory: No sensory deficit.   Psychiatric:         Judgment: Judgment normal.       Significant Labs: A1C:   Recent Labs   Lab 10/05/22  1724   HGBA1C 10.6*       CBC:   Recent Labs   Lab 10/07/22  0232 10/08/22  0432   WBC 13.14* 7.04   HGB 11.0* 11.9*   HCT 34.2* 35.6*   * 141*       CMP:   Recent Labs   Lab 10/07/22  1015 10/08/22  0432    133*   K 3.7 3.8    103   CO2 22* 21*   * 202*   BUN 31* 21   CREATININE 1.3 1.0   CALCIUM 8.6* 8.2*   PROT 5.6* 5.4*   ALBUMIN 2.4* 2.3*   BILITOT 0.4 0.4   ALKPHOS 101 109   AST 30 28   ALT 23 22   ANIONGAP 9 9         Significant Imaging: I have reviewed all pertinent imaging results/findings within the past 24 hours.      Assessment/Plan:      * Septic shock  Patient presents with hypotension, tachycardia, and WBC + pyuria.  This patient does have evidence of infective focus, present on admission  My overall impression is septic shock with multi organ dysfunction (BRENDAN, encephalopathy)  Source: urinary  Antibiotics given-   Antibiotics (From admission, onward)    Start      Stop Route Frequency Ordered    10/08/22 1515  amoxicillin-clavulanate 875-125mg per tablet 1 tablet         10/11 2059 Oral 2 times daily 10/08/22 1512        Latest lactate reviewed-  Recent Labs   Lab 10/07/22  0447 10/07/22  1015 10/07/22  1741   LACTATE 1.7 3.1* 2.9*     Organ dysfunction indicated by Acute kidney injury and encephalopathy    Shock with decreased perfusion noted, Fluid challenge  was given at 30cc/kg  - Blood pressures 70/36 after fluid resuscitation  - Perfusion exam was performed within 6 hours of septic shock presentation after bolus shows Adequate tissue perfusion assessed by non-invasive monitoring  - Pressors initiated on admission- Levophed for MAP of 65 and now off pressors and stepped down to hospital medicine service   - Source control achieved by: Vancomycin + Zosyn. Vancomycin discontinued and zosyn transitioned to po augmentin  - improved and weaned off pressors  - RESOLVED    Positive blood cultures  -CXR nonacute. CT abdomen concerning for stercoral colitis.  -blood cultures grew bacillus in 1 out of 2 bottles and likely a contaminate         Blood in stool  -Hx of apixaban and clopidogrel use.   -Overnight on 10/06 nursing noted blood in stool. CT abdomen noted stercoral proctitis.  -Hgb drop, but unclear if from improvement in DKA associated dehydration vs true bleed      Pneumocephalus  -Per radiology, differential considerations include intravenous induced pneumocephalus or barotrauma (i.e. nose blowing).  Septic thrombosis or craniofacial trauma felt less likely.   - no active issues      Lactic acidosis  Lactic acidosis on admission likely secondary to dehydration from DKA  - RESOLVED      Goals of care, counseling/discussion  Advance Care Planning     Code Status  In light of the patients advanced and life limiting illness,I engaged the the patient and family in a conversation about the patient's preferences for care  at the very end of life. The patient wishes to have  a natural, peaceful death.  Along those lines, the patient does not wish to have CPR or other invasive treatments performed when his heart and/or breathing stops. I communicated to the patient and family that a DNR order would be placed in his medical record to reflect this preference.  I spent a total of 45 minutes engaging the patient in this advance care planning discussion.         Chronic anticoagulation  - Continue home apixaban      Debility  - PT/OT recommending SNF      Adjustment disorder with disturbance of conduct  - Recently worsening depression due to loss of spouse in 2022  - Continue home Sertraline      Diabetic ketoacidosis without coma associated with type 2 diabetes mellitus  Patient's FSGs are controlled on current medication regimen.  Last A1c reviewed-   Lab Results   Component Value Date    HGBA1C 10.6 (H) 10/05/2022     Most recent fingerstick glucose reviewed-   Recent Labs   Lab 10/07/22  2034 10/08/22  0719 10/08/22  1104 10/08/22  1615   POCTGLUCOSE 223* 229* 166* 178*     Current correctional scale  Low  Maintain anti-hyperglycemic dose as follows-   Antihyperglycemics (From admission, onward)    Start     Stop Route Frequency Ordered    10/08/22 0900  insulin detemir U-100 pen 6 Units         -- SubQ 2 times daily 10/07/22 1046    10/07/22 1730  insulin aspart U-100 pen 8-12 Units         -- SubQ 3 times daily with meals 10/07/22 1715    10/07/22 1145  insulin aspart U-100 pen 0-4 Units         -- SubQ As needed (PRN) 10/07/22 1046        Hold Oral hypoglycemics while patient is in the h  ospital.  - endocrinology consulted, apprec recs   -- P/w DKA and shock, initially requiring vasopressors   -- Suspect infection as trigger for DKA? Unclear source as +BCx suspected to be contaminant  -- completed insulin gtt and DKA now resolved   -- continue Levemir 6 units twice daily   - continue Aspart 8-12 unitt based on % of meal eaten -- 8 units if patient eats < or =25%, 10 units if pt eats  "50% and 12 units if patient eats >50%.  -- Low dose correction scale prn AC and snacks with parameters to not administer aspart doses <4hrs apart       Ketosis-prone diabetes mellitus  - see "DKA"    History of partial seizures  - history of focal seizures and history stroke  -Continue home Lacosamide  -Followup Lacosamide levels         Encephalopathy, metabolic  Patient has acute metabolic encephalopathy that is likely secondary to DKA, dehydration.  Treatment for underlying cause is underway.      CT head did not note any acute infarct or intracranial hemorrhage.      PLAN  -DDx: hyperglycemia vs delirium vs UTI   - DKA treatment  -Followup Blood cultures grew bacillus in 1 out of 2 bottles which was likely contaminate  - urinalysis positive for UTI. Ucx showed no predominant organisms. Switched from zosyn to augmentin  -Followup Lacosamide levels  -Will monitor neuro checks carefully, avoid narcotics and benzos that will exacerbate agitation, and use PRN anti-psychotics for controls of behavior for self harm.      History of ST elevation myocardial infarction (STEMI)  See "Coronary artery disease involving native coronary artery of native heart with unstable angina pectoris"      Paroxysmal atrial fibrillation  - EKG on admit sinus rhythm  - Continue home amiodarone, held home metoprolol due to hypotension. Will resume when clinically appropriate          Coronary artery disease involving native coronary artery of native heart with unstable angina pectoris    -Hx of CAD s/p placement of 2 LAUREN in RCA in 01/09/2022.   -Patient denied chest pain on admission, troponin WNL  -EKG on admission(10/05) Sinus rhythm with 1st degree A-V block  -Per chart review, "ASA discontinued 4/13 due to bleeding risk with triple therapy; continuing Plavix and Eliquis" "No need for triple therapy (ASA/Plavix/eliquis) beyond 1 month"     PLAN  -Continue home statin  -Continue home apixaban, clopidogrel   -Metoprolol and Losartan held " due to hypotension. Will resume with clinically appropriate       BRENDAN (acute kidney injury)  Creatinine 3.1 on admit, baseline around 1.1  Possible Etiologies:  - Likely pre-renal from dehydration and volume losses secondary to DKA           Recent Labs     10/05/22  1837 10/05/22  2356 10/06/22  0236   CREATININE 3.2* 2.8* 2.8*   BUN 61* 57* 58*      Estimated Creatinine Clearance: 24.9 mL/min (A) (based on SCr of 2.8 mg/dL (H)).     Plan:   - s/p Fluid resuscitation  -Monitor urine output and serial BMP and adjust therapy as needed.   - Strict I&Os and daily weights   - Avoid nephrotoxic agents such as NSAIDs, gadolinium and IV radiocontrast.  - Renally dose meds to current GFR.  - Scr back to baseline of 1.0  - RESOLVED        Type 2 diabetes mellitus with hyperglycemia, with long-term current use of insulin  Patient's FSGs are uncontrolled due to hyperglycemia on current medication regimen.  Last A1c reviewed-   Lab Results   Component Value Date    HGBA1C 10.6 (H) 10/05/2022     Most recent fingerstick glucose reviewed-   Recent Labs   Lab 10/07/22  2034 10/08/22  0719 10/08/22  1104 10/08/22  1615   POCTGLUCOSE 223* 229* 166* 178*     Current correctional scale  Medium  Maintain anti-hyperglycemic dose as follows-   Antihyperglycemics (From admission, onward)    Start     Stop Route Frequency Ordered    10/08/22 0900  insulin detemir U-100 pen 6 Units         -- SubQ 2 times daily 10/07/22 1046    10/07/22 1730  insulin aspart U-100 pen 8-12 Units         -- SubQ 3 times daily with meals 10/07/22 1715    10/07/22 1145  insulin aspart U-100 pen 0-4 Units         -- SubQ As needed (PRN) 10/07/22 1046        Hold Oral hypoglycemics while patient is in the hospital.    Essential hypertension  - holding home Metoprolol, losartan due to hypotension. Resumed as clinically appropriate        VTE Risk Mitigation (From admission, onward)         Ordered     apixaban tablet 5 mg  2 times daily         10/05/22 3427      IP VTE HIGH RISK PATIENT  Once         10/05/22 1806     Place LUANN hose  Until discontinued         10/05/22 1806     Place sequential compression device  Until discontinued         10/05/22 1806     Place sequential compression device  Until discontinued         10/05/22 1754                Discharge Planning   ALLIE: 10/11/2022     Code Status: DNR   Is the patient medically ready for discharge?: Yes    Reason for patient still in hospital (select all that apply): Patient unstable, Patient trending condition, Laboratory test, Treatment and Consult recommendations  Discharge Plan A: Return to nursing home            Time spent in care of patient: > 35 minutes         Israel Escamilla MD  Department of Hospital Medicine   [facility]

## 2022-10-08 NOTE — PROGRESS NOTES
[facility] Medicine  Progress Note    Patient Name: Kamar Muñoz  MRN: [id number]  Patient Class: IP- Inpatient   Admission Date: 10/5/2022  Length of Stay: 3 days  Attending Physician: Israel Escamilla MD  Primary Care Provider: Basim Guerrero MD        Subjective:     Principal Problem:Septic shock        HPI:     Mr. Muñoz is a 77 y.o M/ w/ hx of stroke, T2DM(poorly controlled with recurrent DKA), CAD, HLD, paroxysmal Afib, and seizures who presented to the ED via EMS from [facility] with concerns of nausea/vomiting, hypotension, hyperglycemia. Per daughter, he has been depressed and has had very poor oral intake since his wife passed away 4 weeks ago.  He has not been taking care of himself. He was seen by his PCP 3 days ago who adjusted his anitdepressants dose recently. He also had a fall last Friday while he was getting dressed that was described as mild. It was not associated with head trauma, as most of fall was on his elbow.      ED course notable for hyperglycemia, keotacidosis, hypotension requiring vasopressors. Critical Care Medicine was consulted for evaluation.       Overview/Hospital Course:  No acute events overnight, afebrile. Weaned off vasopressors. DKA improving. He is more alert and oriented and appears back to his baseline.         Interval History: Patient lying in bed, no acute distress. BG controlled with insulin regimen. Ambulating without difficulty.       Review of Systems   Constitutional:  Positive for activity change and fatigue. Negative for chills and fever.   HENT:  Negative for congestion and trouble swallowing.    Eyes:  Negative for visual disturbance.   Respiratory:  Negative for cough and shortness of breath.    Cardiovascular:  Negative for chest pain and leg swelling.   Gastrointestinal:  Negative for abdominal distention, abdominal pain, nausea and vomiting.   Endocrine: Negative for cold intolerance, heat intolerance,  polydipsia and polyuria.   Genitourinary:  Negative for difficulty urinating and dysuria.   Musculoskeletal:  Negative for back pain and myalgias.   Skin:  Negative for rash and wound.   Neurological:  Positive for weakness. Negative for dizziness and light-headedness.   Hematological:  Negative for adenopathy. Does not bruise/bleed easily.   Psychiatric/Behavioral:  Negative for confusion and sleep disturbance.    Objective:     Vital Signs (Most Recent):  Temp: 96.6 °F (35.9 °C) (10/07/22 2320)  Pulse: 65 (10/07/22 2320)  Resp: 16 (10/07/22 2320)  BP: 125/69 (10/07/22 2320)  SpO2: (!) 92 % (10/07/22 2320)   Vital Signs (24h Range):  Temp:  [96.6 °F (35.9 °C)-98.8 °F (37.1 °C)] 96.6 °F (35.9 °C)  Pulse:  [62-77] 65  Resp:  [16-19] 16  SpO2:  [91 %-98 %] 92 %  BP: (125-173)/(63-79) 125/69     Weight: 86.2 kg (190 lb)  Body mass index is 24.39 kg/m².    Intake/Output Summary (Last 24 hours) at 10/8/2022 0115  Last data filed at 10/7/2022 1717  Gross per 24 hour   Intake 1350 ml   Output --   Net 1350 ml      Physical Exam  Constitutional:       Appearance: He is well-developed.   HENT:      Head: Normocephalic and atraumatic.   Eyes:      General: No scleral icterus.     Pupils: Pupils are equal, round, and reactive to light.   Neck:      Vascular: No JVD.   Cardiovascular:      Rate and Rhythm: Normal rate and regular rhythm.      Heart sounds: No murmur heard.    No friction rub. No gallop.   Pulmonary:      Effort: Pulmonary effort is normal. No respiratory distress.      Breath sounds: Normal breath sounds. No wheezing or rales.   Abdominal:      General: Bowel sounds are normal. There is no distension.      Palpations: Abdomen is soft.      Tenderness: There is no abdominal tenderness. There is no guarding or rebound.   Musculoskeletal:         General: No deformity. Normal range of motion.      Cervical back: Neck supple.   Lymphadenopathy:      Cervical: No cervical adenopathy.   Skin:     General: Skin is  warm and dry.      Capillary Refill: Capillary refill takes less than 2 seconds.      Findings: No erythema or rash.   Neurological:      Mental Status: He is alert and oriented to person, place, and time.      Cranial Nerves: No cranial nerve deficit.      Sensory: No sensory deficit.   Psychiatric:         Judgment: Judgment normal.       Significant Labs: A1C:   Recent Labs   Lab 10/05/22  1724   HGBA1C 10.6*     CBC:   Recent Labs   Lab 10/06/22  0236 10/07/22  0232   WBC 16.39* 13.14*   HGB 11.7* 11.0*   HCT 35.2* 34.2*    131*     CMP:   Recent Labs   Lab 10/06/22  0236 10/06/22  1104 10/07/22  1015    140 138   K 4.4 4.4 3.7    106 107   CO2 21* 24 22*   * 144* 112*   BUN 58* 47* 31*   CREATININE 2.8* 2.0* 1.3   CALCIUM 9.0 8.6* 8.6*   PROT  --   --  5.6*   ALBUMIN  --   --  2.4*   BILITOT  --   --  0.4   ALKPHOS  --   --  101   AST  --   --  30   ALT  --   --  23   ANIONGAP 15 10 9       Significant Imaging: I have reviewed all pertinent imaging results/findings within the past 24 hours.      Assessment/Plan:      * Septic shock  Patient presents with hypotension, tachycardia, and WBC + pyuria.  This patient does have evidence of infective focus, present on admission  My overall impression is septic shock with multi organ dysfunction (BRENDAN, encephalopathy)  Source: urinary  Antibiotics given-   Antibiotics (From admission, onward)    Start     Stop Route Frequency Ordered    10/07/22 1230  vancomycin 1.25 g in dextrose 5% 250 mL IVPB (ready to mix)         -- IV Once 10/07/22 1125        Latest lactate reviewed-  Recent Labs   Lab 10/07/22  0232 10/07/22  0447 10/07/22  1015   LACTATE 1.3 1.7 3.1*     Organ dysfunction indicated by Acute kidney injury and encephalopathy    Shock with decreased perfusion noted, Fluid challenge  was given at 30cc/kg  - Blood pressures 70/36 after fluid resuscitation  - Perfusion exam was performed within 6 hours of septic shock presentation after bolus  shows Adequate tissue perfusion assessed by non-invasive monitoring  - Pressors initiated on admission- Levophed for MAP of 65  - Source control achieved by: Vancomycin + Zosyn  - improved and weaned off pressors  - stepped down to floor for further management    Positive blood cultures  -CXR nonacute. CT abdomen concerning for stercoral colitis.  -Differential diagnosis includes shock as he is requiring pressors vs. Contaminants.     PLAN  -Followup repeat blood cultures and urine cultures. Deescalate antibiotics as necessitated         Blood in stool  -Hx of apixaban and clopidogrel use.   -Overnight on 10/06 nursing noted blood in stool. CT abdomen noted stercoral proctitis.  -Hgb drop, but unclear if from improvement in DKA associated dehydration vs true bleed      Pneumocephalus  -Per radiology, differential considerations include intravenous induced pneumocephalus or barotrauma (i.e. nose blowing).  Septic thrombosis or craniofacial trauma felt less likely.       Lactic acidosis  Lactic acidosis on admission likely secondary to dehydration from DKA      Chronic anticoagulation  Continue home apixaban      Debility  PT/OT      Adjustment disorder with disturbance of conduct  Recently worsening depression due to loss of spouse 4 weeks ago.      PLAN  Continue home Sertr      History of partial seizures  Hx of focal seizures     PLAN  -Continue home Lacosamide  -Followup Lacosamide levels         Encephalopathy, metabolic  Patient has acute metabolic encephalopathy that is likely secondary to DKA, dehydration.  Treatment for underlying cause is underway.      CT head did not note any acute infarct or intracranial hemorrhage.      PLAN  -DKA treatment  -Followup Blood cultures, urinalysis. Deescalate antibiotics as necessitated  -Followup Lacosamide levels  -Will monitor neuro checks carefully, avoid narcotics and benzos that will exacerbate agitation, and use PRN anti-psychotics for controls of behavior for self  "harm.      History of ST elevation myocardial infarction (STEMI)  See "Coronary artery disease involving native coronary artery of native heart with unstable angina pectoris"      Paroxysmal atrial fibrillation  EKG on admit sinus rhythm     PLAN  Continue home amiodarone, held home metoprolol due to hypotension requiring vasopressors          Coronary artery disease involving native coronary artery of native heart with unstable angina pectoris    -Hx of CAD s/p placement of 2 LAUREN in RCA in 01/09/2022.   -Patient denied chest pain on admission, troponin WNL  -EKG on admission(10/05) Sinus rhythm with 1st degree A-V block  -Per chart review, "ASA discontinued 4/13 due to bleeding risk with triple therapy; continuing Plavix and Eliquis" "No need for triple therapy (ASA/Plavix/eliquis) beyond 1 month"     PLAN  -Continue home statin  -Continue home apixaban, clopidogrel   -Metoprolol and Losartan held due to hypotension requiring vasopressors       BRENDAN (acute kidney injury)  Creatinine 3.1 on admit, baseline around 1.1  Possible Etiologies:  - Likely pre-renal from dehydration and volume losses secondary to DKA           Recent Labs     10/05/22  1837 10/05/22  2356 10/06/22  0236   CREATININE 3.2* 2.8* 2.8*   BUN 61* 57* 58*      Estimated Creatinine Clearance: 24.9 mL/min (A) (based on SCr of 2.8 mg/dL (H)).     Plan:   -Fluid resuscitation  -Monitor urine output and serial BMP and adjust therapy as needed.   - Strict I&Os and daily weights   - Avoid nephrotoxic agents such as NSAIDs, gadolinium and IV radiocontrast.  - Renally dose meds to current GFR.        Type 2 diabetes mellitus with hyperglycemia, with long-term current use of insulin  Patient's FSGs are uncontrolled due to hyperglycemia on current medication regimen.  Last A1c reviewed-   Lab Results   Component Value Date    HGBA1C 10.6 (H) 10/05/2022     Most recent fingerstick glucose reviewed-   Recent Labs   Lab 10/07/22  1634 10/07/22  1711 " 10/07/22  1830 10/07/22  2034   POCTGLUCOSE 61* 84 154* 223*     Current correctional scale  Medium  Maintain anti-hyperglycemic dose as follows-   Antihyperglycemics (From admission, onward)    Start     Stop Route Frequency Ordered    10/08/22 0900  insulin detemir U-100 pen 6 Units         -- SubQ 2 times daily 10/07/22 1046    10/07/22 1730  insulin aspart U-100 pen 8-12 Units         -- SubQ 3 times daily with meals 10/07/22 1715    10/07/22 1145  insulin aspart U-100 pen 0-4 Units         -- SubQ As needed (PRN) 10/07/22 1046        Hold Oral hypoglycemics while patient is in the hospital.    Essential hypertension  Home medications: Metoprolol, losartan        VTE Risk Mitigation (From admission, onward)         Ordered     apixaban tablet 5 mg  2 times daily         10/05/22 2325     IP VTE HIGH RISK PATIENT  Once         10/05/22 1806     Place LUANN hose  Until discontinued         10/05/22 1806     Place sequential compression device  Until discontinued         10/05/22 1806     Place sequential compression device  Until discontinued         10/05/22 1754                Discharge Planning   ALLIE: 10/11/2022     Code Status: DNR   Is the patient medically ready for discharge?: No    Reason for patient still in hospital (select all that apply): Patient unstable, Patient trending condition, Laboratory test, Treatment, Consult recommendations and PT / OT recommendations  Discharge Plan A: Return to nursing home          Time spent in care of patient: > 35 minutes           Israel Escamilla MD  Department of Hospital Medicine   [facility]

## 2022-10-08 NOTE — ASSESSMENT & PLAN NOTE
-Per radiology, differential considerations include intravenous induced pneumocephalus or barotrauma (i.e. nose blowing).  Septic thrombosis or craniofacial trauma felt less likely.   - no active issues

## 2022-10-08 NOTE — ASSESSMENT & PLAN NOTE
-CXR nonacute. CT abdomen concerning for stercoral colitis.  -blood cultures grew bacillus in 1 out of 2 bottles and likely a contaminate

## 2022-10-08 NOTE — HPI
Mr. Muñoz is a 77 y.o M/ w/ hx of stroke, T2DM(poorly controlled with recurrent DKA), CAD, HLD, paroxysmal Afib, and seizures who presented to the ED via EMS from [facility] with concerns of nausea/vomiting, hypotension, hyperglycemia. Per daughter, he has been depressed and has had very poor oral intake since his wife passed away 4 weeks ago.  He has not been taking care of himself. He was seen by his PCP 3 days ago who adjusted his anitdepressants dose recently. He also had a fall last Friday while he was getting dressed that was described as mild. It was not associated with head trauma, as most of fall was on his elbow.      ED course notable for hyperglycemia, keotacidosis, hypotension requiring vasopressors. Critical Care Medicine was consulted for evaluation.

## 2022-10-08 NOTE — ASSESSMENT & PLAN NOTE
- history of focal seizures and history stroke  -Continue home Lacosamide  -Followup Lacosamide levels

## 2022-10-08 NOTE — ASSESSMENT & PLAN NOTE
Patient presents with hypotension, tachycardia, and WBC + pyuria.  This patient does have evidence of infective focus, present on admission  My overall impression is septic shock with multi organ dysfunction (BRENDAN, encephalopathy)  Source: urinary  Antibiotics given-   Antibiotics (From admission, onward)    Start     Stop Route Frequency Ordered    10/08/22 1515  amoxicillin-clavulanate 875-125mg per tablet 1 tablet         10/11 2059 Oral 2 times daily 10/08/22 1512        Latest lactate reviewed-  Recent Labs   Lab 10/07/22  0447 10/07/22  1015 10/07/22  1741   LACTATE 1.7 3.1* 2.9*     Organ dysfunction indicated by Acute kidney injury and encephalopathy    Shock with decreased perfusion noted, Fluid challenge  was given at 30cc/kg  - Blood pressures 70/36 after fluid resuscitation  - Perfusion exam was performed within 6 hours of septic shock presentation after bolus shows Adequate tissue perfusion assessed by non-invasive monitoring  - Pressors initiated on admission- Levophed for MAP of 65 and now off pressors and stepped down to hospital medicine service   - Source control achieved by: Vancomycin + Zosyn. Vancomycin discontinued and zosyn transitioned to po augmentin  - improved and weaned off pressors  - RESOLVED

## 2022-10-08 NOTE — ASSESSMENT & PLAN NOTE
Creatinine 3.1 on admit, baseline around 1.1  Possible Etiologies:  - Likely pre-renal from dehydration and volume losses secondary to DKA           Recent Labs     10/05/22  1837 10/05/22  2356 10/06/22  0236   CREATININE 3.2* 2.8* 2.8*   BUN 61* 57* 58*      Estimated Creatinine Clearance: 24.9 mL/min (A) (based on SCr of 2.8 mg/dL (H)).     Plan:   -Fluid resuscitation  -Monitor urine output and serial BMP and adjust therapy as needed.   - Strict I&Os and daily weights   - Avoid nephrotoxic agents such as NSAIDs, gadolinium and IV radiocontrast.  - Renally dose meds to current GFR.

## 2022-10-08 NOTE — ASSESSMENT & PLAN NOTE
Patient's FSGs are uncontrolled due to hyperglycemia on current medication regimen.  Last A1c reviewed-   Lab Results   Component Value Date    HGBA1C 10.6 (H) 10/05/2022     Most recent fingerstick glucose reviewed-   Recent Labs   Lab 10/07/22  2034 10/08/22  0719 10/08/22  1104 10/08/22  1615   POCTGLUCOSE 223* 229* 166* 178*     Current correctional scale  Medium  Maintain anti-hyperglycemic dose as follows-   Antihyperglycemics (From admission, onward)    Start     Stop Route Frequency Ordered    10/08/22 0900  insulin detemir U-100 pen 6 Units         -- SubQ 2 times daily 10/07/22 1046    10/07/22 1730  insulin aspart U-100 pen 8-12 Units         -- SubQ 3 times daily with meals 10/07/22 1715    10/07/22 1145  insulin aspart U-100 pen 0-4 Units         -- SubQ As needed (PRN) 10/07/22 1046        Hold Oral hypoglycemics while patient is in the hospital.

## 2022-10-08 NOTE — ASSESSMENT & PLAN NOTE
- holding home Metoprolol, losartan due to hypotension. Resumed as clinically appropriate     Detail Level: Zone Include Location In Plan?: No

## 2022-10-08 NOTE — ASSESSMENT & PLAN NOTE
"  -Hx of CAD s/p placement of 2 LAUREN in RCA in 01/09/2022.   -Patient denied chest pain on admission, troponin WNL  -EKG on admission(10/05) Sinus rhythm with 1st degree A-V block  -Per chart review, "ASA discontinued 4/13 due to bleeding risk with triple therapy; continuing Plavix and Eliquis" "No need for triple therapy (ASA/Plavix/eliquis) beyond 1 month"     PLAN  -Continue home statin  -Continue home apixaban, clopidogrel   -Metoprolol and Losartan held due to hypotension requiring vasopressors     "

## 2022-10-08 NOTE — ASSESSMENT & PLAN NOTE
Patient's FSGs are controlled on current medication regimen.  Last A1c reviewed-   Lab Results   Component Value Date    HGBA1C 10.6 (H) 10/05/2022     Most recent fingerstick glucose reviewed-   Recent Labs   Lab 10/07/22  2034 10/08/22  0719 10/08/22  1104 10/08/22  1615   POCTGLUCOSE 223* 229* 166* 178*     Current correctional scale  Low  Maintain anti-hyperglycemic dose as follows-   Antihyperglycemics (From admission, onward)    Start     Stop Route Frequency Ordered    10/08/22 0900  insulin detemir U-100 pen 6 Units         -- SubQ 2 times daily 10/07/22 1046    10/07/22 1730  insulin aspart U-100 pen 8-12 Units         -- SubQ 3 times daily with meals 10/07/22 1715    10/07/22 1145  insulin aspart U-100 pen 0-4 Units         -- SubQ As needed (PRN) 10/07/22 1046        Hold Oral hypoglycemics while patient is in the h  ospital.  - endocrinology consulted, apprec recs   -- P/w DKA and shock, initially requiring vasopressors   -- Suspect infection as trigger for DKA? Unclear source as +BCx suspected to be contaminant  -- completed insulin gtt and DKA now resolved   -- continue Levemir 6 units twice daily   - continue Aspart 8-12 unitt based on % of meal eaten -- 8 units if patient eats < or =25%, 10 units if pt eats 50% and 12 units if patient eats >50%.  -- Low dose correction scale prn AC and snacks with parameters to not administer aspart doses <4hrs apart

## 2022-10-08 NOTE — SUBJECTIVE & OBJECTIVE
Interval History: Patient lying in bed, no acute distress. Son at bedside. Alert and oriented x 3. Patient and son note fluctuations in patient's confusion and lucidness which primary team explained is likely related to infection and delirium. Completed 3 days of zosyn and repeat UA showed improvement in pyruia. Will continue UTI treatment with oral Augmentin since there were 13 urine WBC on UA. Denies fever, chills, dysuria, N/V or SOB. Does note lower back pain. On lidocaine patch and added robaxin. Endocrinology titrating insulin regimen with improved BG control. Ordered delirium precautions. Boost TID ordered and PT/OT recommending SNF so primary team will work with  to determine if patient's insurance will approve discharge to [facility].      Review of Systems   Constitutional:  Positive for activity change and fatigue. Negative for chills and fever.   HENT:  Negative for congestion and trouble swallowing.    Eyes:  Negative for visual disturbance.   Respiratory:  Negative for cough and shortness of breath.    Cardiovascular:  Negative for chest pain and leg swelling.   Gastrointestinal:  Negative for abdominal distention, abdominal pain, nausea and vomiting.   Endocrine: Negative for cold intolerance, heat intolerance, polydipsia and polyuria.   Genitourinary:  Negative for difficulty urinating and dysuria.   Musculoskeletal:  Negative for back pain and myalgias.   Skin:  Negative for rash and wound.   Neurological:  Positive for weakness. Negative for dizziness and light-headedness.   Hematological:  Negative for adenopathy. Does not bruise/bleed easily.   Psychiatric/Behavioral:  Negative for confusion and sleep disturbance.    Objective:     Vital Signs (Most Recent):  Temp: 97.4 °F (36.3 °C) (10/08/22 1542)  Pulse: 66 (10/08/22 1542)  Resp: 16 (10/08/22 1542)  BP: (!) 162/85 (10/08/22 1542)  SpO2: (!) 94 % (10/08/22 1542)   Vital Signs (24h Range):  Temp:  [96.2 °F (35.7 °C)-98.6 °F (37 °C)] 97.4  °F (36.3 °C)  Pulse:  [65-77] 66  Resp:  [16-19] 16  SpO2:  [91 %-95 %] 94 %  BP: (117-170)/(63-85) 162/85     Weight: 86.2 kg (190 lb)  Body mass index is 24.39 kg/m².    Intake/Output Summary (Last 24 hours) at 10/8/2022 1727  Last data filed at 10/8/2022 1658  Gross per 24 hour   Intake 890 ml   Output 1050 ml   Net -160 ml        Physical Exam  Constitutional:       Appearance: He is well-developed.   HENT:      Head: Normocephalic and atraumatic.   Eyes:      General: No scleral icterus.     Pupils: Pupils are equal, round, and reactive to light.   Neck:      Vascular: No JVD.   Cardiovascular:      Rate and Rhythm: Normal rate and regular rhythm.      Heart sounds: No murmur heard.    No friction rub. No gallop.   Pulmonary:      Effort: Pulmonary effort is normal. No respiratory distress.      Breath sounds: Normal breath sounds. No wheezing or rales.   Abdominal:      General: Bowel sounds are normal. There is no distension.      Palpations: Abdomen is soft.      Tenderness: There is no abdominal tenderness. There is no guarding or rebound.   Musculoskeletal:         General: No deformity. Normal range of motion.      Cervical back: Neck supple.   Lymphadenopathy:      Cervical: No cervical adenopathy.   Skin:     General: Skin is warm and dry.      Capillary Refill: Capillary refill takes less than 2 seconds.      Findings: No erythema or rash.   Neurological:      Mental Status: He is alert and oriented to person, place, and time.      Cranial Nerves: No cranial nerve deficit.      Sensory: No sensory deficit.   Psychiatric:         Judgment: Judgment normal.       Significant Labs: A1C:   Recent Labs   Lab 10/05/22  1724   HGBA1C 10.6*       CBC:   Recent Labs   Lab 10/07/22  0232 10/08/22  0432   WBC 13.14* 7.04   HGB 11.0* 11.9*   HCT 34.2* 35.6*   * 141*       CMP:   Recent Labs   Lab 10/07/22  1015 10/08/22  0432    133*   K 3.7 3.8    103   CO2 22* 21*   * 202*   BUN 31* 21    CREATININE 1.3 1.0   CALCIUM 8.6* 8.2*   PROT 5.6* 5.4*   ALBUMIN 2.4* 2.3*   BILITOT 0.4 0.4   ALKPHOS 101 109   AST 30 28   ALT 23 22   ANIONGAP 9 9         Significant Imaging: I have reviewed all pertinent imaging results/findings within the past 24 hours.

## 2022-10-08 NOTE — ASSESSMENT & PLAN NOTE
Creatinine 3.1 on admit, baseline around 1.1  Possible Etiologies:  - Likely pre-renal from dehydration and volume losses secondary to DKA           Recent Labs     10/05/22  1837 10/05/22  2356 10/06/22  0236   CREATININE 3.2* 2.8* 2.8*   BUN 61* 57* 58*      Estimated Creatinine Clearance: 24.9 mL/min (A) (based on SCr of 2.8 mg/dL (H)).     Plan:   - s/p Fluid resuscitation  -Monitor urine output and serial BMP and adjust therapy as needed.   - Strict I&Os and daily weights   - Avoid nephrotoxic agents such as NSAIDs, gadolinium and IV radiocontrast.  - Renally dose meds to current GFR.  - Scr back to baseline of 1.0  - RESOLVED

## 2022-10-08 NOTE — HOSPITAL COURSE
No acute events overnight, afebrile. Weaned off vasopressors. DKA improving. He is more alert and oriented and appears back to his baseline. Endocrinology was consulted and assisted with insulin management. Once blood glucose levels remained stable and at goal, patient was discharge back to his nursing home with outpatient PCP and endocrinology followup.

## 2022-10-08 NOTE — ASSESSMENT & PLAN NOTE
-Hx of apixaban and clopidogrel use.   -Overnight on 10/06 nursing noted blood in stool. CT abdomen noted stercoral proctitis.  -Hgb drop, but unclear if from improvement in DKA associated dehydration vs true bleed

## 2022-10-08 NOTE — ASSESSMENT & PLAN NOTE
"See "Coronary artery disease involving native coronary artery of native heart with unstable angina pectoris"    "

## 2022-10-08 NOTE — ASSESSMENT & PLAN NOTE
"  -Hx of CAD s/p placement of 2 LAUREN in RCA in 01/09/2022.   -Patient denied chest pain on admission, troponin WNL  -EKG on admission(10/05) Sinus rhythm with 1st degree A-V block  -Per chart review, "ASA discontinued 4/13 due to bleeding risk with triple therapy; continuing Plavix and Eliquis" "No need for triple therapy (ASA/Plavix/eliquis) beyond 1 month"     PLAN  -Continue home statin  -Continue home apixaban, clopidogrel   -Metoprolol and Losartan held due to hypotension. Will resume with clinically appropriate     "

## 2022-10-08 NOTE — ASSESSMENT & PLAN NOTE
-Per radiology, differential considerations include intravenous induced pneumocephalus or barotrauma (i.e. nose blowing).  Septic thrombosis or craniofacial trauma felt less likely.

## 2022-10-08 NOTE — SUBJECTIVE & OBJECTIVE
Interval History: Patient lying in bed, no acute distress. BG controlled with insulin regimen. Ambulating without difficulty.       Review of Systems   Constitutional:  Positive for activity change and fatigue. Negative for chills and fever.   HENT:  Negative for congestion and trouble swallowing.    Eyes:  Negative for visual disturbance.   Respiratory:  Negative for cough and shortness of breath.    Cardiovascular:  Negative for chest pain and leg swelling.   Gastrointestinal:  Negative for abdominal distention, abdominal pain, nausea and vomiting.   Endocrine: Negative for cold intolerance, heat intolerance, polydipsia and polyuria.   Genitourinary:  Negative for difficulty urinating and dysuria.   Musculoskeletal:  Negative for back pain and myalgias.   Skin:  Negative for rash and wound.   Neurological:  Positive for weakness. Negative for dizziness and light-headedness.   Hematological:  Negative for adenopathy. Does not bruise/bleed easily.   Psychiatric/Behavioral:  Negative for confusion and sleep disturbance.    Objective:     Vital Signs (Most Recent):  Temp: 96.6 °F (35.9 °C) (10/07/22 2320)  Pulse: 65 (10/07/22 2320)  Resp: 16 (10/07/22 2320)  BP: 125/69 (10/07/22 2320)  SpO2: (!) 92 % (10/07/22 2320)   Vital Signs (24h Range):  Temp:  [96.6 °F (35.9 °C)-98.8 °F (37.1 °C)] 96.6 °F (35.9 °C)  Pulse:  [62-77] 65  Resp:  [16-19] 16  SpO2:  [91 %-98 %] 92 %  BP: (125-173)/(63-79) 125/69     Weight: 86.2 kg (190 lb)  Body mass index is 24.39 kg/m².    Intake/Output Summary (Last 24 hours) at 10/8/2022 0115  Last data filed at 10/7/2022 1717  Gross per 24 hour   Intake 1350 ml   Output --   Net 1350 ml      Physical Exam  Constitutional:       Appearance: He is well-developed.   HENT:      Head: Normocephalic and atraumatic.   Eyes:      General: No scleral icterus.     Pupils: Pupils are equal, round, and reactive to light.   Neck:      Vascular: No JVD.   Cardiovascular:      Rate and Rhythm: Normal rate  and regular rhythm.      Heart sounds: No murmur heard.    No friction rub. No gallop.   Pulmonary:      Effort: Pulmonary effort is normal. No respiratory distress.      Breath sounds: Normal breath sounds. No wheezing or rales.   Abdominal:      General: Bowel sounds are normal. There is no distension.      Palpations: Abdomen is soft.      Tenderness: There is no abdominal tenderness. There is no guarding or rebound.   Musculoskeletal:         General: No deformity. Normal range of motion.      Cervical back: Neck supple.   Lymphadenopathy:      Cervical: No cervical adenopathy.   Skin:     General: Skin is warm and dry.      Capillary Refill: Capillary refill takes less than 2 seconds.      Findings: No erythema or rash.   Neurological:      Mental Status: He is alert and oriented to person, place, and time.      Cranial Nerves: No cranial nerve deficit.      Sensory: No sensory deficit.   Psychiatric:         Judgment: Judgment normal.       Significant Labs: A1C:   Recent Labs   Lab 10/05/22  1724   HGBA1C 10.6*     CBC:   Recent Labs   Lab 10/06/22  0236 10/07/22  0232   WBC 16.39* 13.14*   HGB 11.7* 11.0*   HCT 35.2* 34.2*    131*     CMP:   Recent Labs   Lab 10/06/22  0236 10/06/22  1104 10/07/22  1015    140 138   K 4.4 4.4 3.7    106 107   CO2 21* 24 22*   * 144* 112*   BUN 58* 47* 31*   CREATININE 2.8* 2.0* 1.3   CALCIUM 9.0 8.6* 8.6*   PROT  --   --  5.6*   ALBUMIN  --   --  2.4*   BILITOT  --   --  0.4   ALKPHOS  --   --  101   AST  --   --  30   ALT  --   --  23   ANIONGAP 15 10 9       Significant Imaging: I have reviewed all pertinent imaging results/findings within the past 24 hours.

## 2022-10-08 NOTE — ASSESSMENT & PLAN NOTE
Patient's FSGs are uncontrolled due to hyperglycemia on current medication regimen.  Last A1c reviewed-   Lab Results   Component Value Date    HGBA1C 10.6 (H) 10/05/2022     Most recent fingerstick glucose reviewed-   Recent Labs   Lab 10/07/22  1634 10/07/22  1711 10/07/22  1830 10/07/22  2034   POCTGLUCOSE 61* 84 154* 223*     Current correctional scale  Medium  Maintain anti-hyperglycemic dose as follows-   Antihyperglycemics (From admission, onward)    Start     Stop Route Frequency Ordered    10/08/22 0900  insulin detemir U-100 pen 6 Units         -- SubQ 2 times daily 10/07/22 1046    10/07/22 1730  insulin aspart U-100 pen 8-12 Units         -- SubQ 3 times daily with meals 10/07/22 1715    10/07/22 1145  insulin aspart U-100 pen 0-4 Units         -- SubQ As needed (PRN) 10/07/22 1046        Hold Oral hypoglycemics while patient is in the hospital.

## 2022-10-08 NOTE — ASSESSMENT & PLAN NOTE
-CXR nonacute. CT abdomen concerning for stercoral colitis.  -Differential diagnosis includes shock as he is requiring pressors vs. Contaminants.     PLAN  -Followup repeat blood cultures and urine cultures. Deescalate antibiotics as necessitated

## 2022-10-08 NOTE — ASSESSMENT & PLAN NOTE
- BG highly labile - subQ regimen started initially with very large increase in doses compared to last endocrine recommended regimen in 5/2022  - AM fasting today in 200s after low yesterday. Will monitor pt on current regimen for now. Most recent BG 160s - at goal.    - Continue Levemir 6 units twice daily   - Continue Aspart 8-12 unitt based on % of meal eaten -- 8 units if patient eats < or =25%, 10 units if pt eats 50% and 12 units if patient eats >50%.  - Low dose correction scale prn AC and snacks with parameters to not administer aspart doses <4hrs apart    - Hypoglycemia protocol in place  - If blood glucose greater than 300, please ask patient not to eat food or drink anything other than water until correctional insulin has brought it back below 250  - **Please ensure patient receives their p.r.n. insulin aspart if they eat a snack with more than 30 g of carbohydrate    ** Please notify Endocrine for any change and/or advance in diet**  ** Please call Endocrine for any BG related issues **

## 2022-10-08 NOTE — ASSESSMENT & PLAN NOTE
Advance Care Planning     Code Status  In light of the patients advanced and life limiting illness,I engaged the the patient and family in a conversation about the patient's preferences for care  at the very end of life. The patient wishes to have a natural, peaceful death.  Along those lines, the patient does not wish to have CPR or other invasive treatments performed when his heart and/or breathing stops. I communicated to the patient and family that a DNR order would be placed in his medical record to reflect this preference.  I spent a total of 45 minutes engaging the patient in this advance care planning discussion.

## 2022-10-08 NOTE — ASSESSMENT & PLAN NOTE
Patient has acute metabolic encephalopathy that is likely secondary to DKA, dehydration.  Treatment for underlying cause is underway.      CT head did not note any acute infarct or intracranial hemorrhage.      PLAN  -DDx: hyperglycemia vs delirium vs UTI   - DKA treatment  -Followup Blood cultures grew bacillus in 1 out of 2 bottles which was likely contaminate  - urinalysis positive for UTI. Ucx showed no predominant organisms. Switched from zosyn to augmentin  -Followup Lacosamide levels  -Will monitor neuro checks carefully, avoid narcotics and benzos that will exacerbate agitation, and use PRN anti-psychotics for controls of behavior for self harm.

## 2022-10-09 PROBLEM — A41.9 SEPTIC SHOCK: Status: RESOLVED | Noted: 2022-02-20 | Resolved: 2022-10-09

## 2022-10-09 PROBLEM — R65.21 SEPTIC SHOCK: Status: RESOLVED | Noted: 2022-02-20 | Resolved: 2022-10-09

## 2022-10-09 LAB
ALBUMIN SERPL BCP-MCNC: 2.4 G/DL (ref 3.5–5.2)
ALP SERPL-CCNC: 124 U/L (ref 55–135)
ALT SERPL W/O P-5'-P-CCNC: 21 U/L (ref 10–44)
ANION GAP SERPL CALC-SCNC: 8 MMOL/L (ref 8–16)
AST SERPL-CCNC: 26 U/L (ref 10–40)
B-OH-BUTYR BLD STRIP-SCNC: 0.3 MMOL/L (ref 0–0.5)
BASOPHILS # BLD AUTO: 0.03 K/UL (ref 0–0.2)
BASOPHILS NFR BLD: 0.5 % (ref 0–1.9)
BILIRUB SERPL-MCNC: 0.5 MG/DL (ref 0.1–1)
BUN SERPL-MCNC: 12 MG/DL (ref 8–23)
CALCIUM SERPL-MCNC: 8.5 MG/DL (ref 8.7–10.5)
CHLORIDE SERPL-SCNC: 103 MMOL/L (ref 95–110)
CO2 SERPL-SCNC: 22 MMOL/L (ref 23–29)
CREAT SERPL-MCNC: 0.8 MG/DL (ref 0.5–1.4)
DIFFERENTIAL METHOD: ABNORMAL
EOSINOPHIL # BLD AUTO: 0.4 K/UL (ref 0–0.5)
EOSINOPHIL NFR BLD: 5.8 % (ref 0–8)
ERYTHROCYTE [DISTWIDTH] IN BLOOD BY AUTOMATED COUNT: 11.4 % (ref 11.5–14.5)
EST. GFR  (NO RACE VARIABLE): >60 ML/MIN/1.73 M^2
GLUCOSE SERPL-MCNC: 139 MG/DL (ref 70–110)
HCT VFR BLD AUTO: 40.7 % (ref 40–54)
HGB BLD-MCNC: 12.9 G/DL (ref 14–18)
IMM GRANULOCYTES # BLD AUTO: 0.02 K/UL (ref 0–0.04)
IMM GRANULOCYTES NFR BLD AUTO: 0.3 % (ref 0–0.5)
LYMPHOCYTES # BLD AUTO: 1.1 K/UL (ref 1–4.8)
LYMPHOCYTES NFR BLD: 17 % (ref 18–48)
MAGNESIUM SERPL-MCNC: 1.7 MG/DL (ref 1.6–2.6)
MCH RBC QN AUTO: 29.6 PG (ref 27–31)
MCHC RBC AUTO-ENTMCNC: 31.7 G/DL (ref 32–36)
MCV RBC AUTO: 93 FL (ref 82–98)
MONOCYTES # BLD AUTO: 0.5 K/UL (ref 0.3–1)
MONOCYTES NFR BLD: 7.6 % (ref 4–15)
NEUTROPHILS # BLD AUTO: 4.2 K/UL (ref 1.8–7.7)
NEUTROPHILS NFR BLD: 68.8 % (ref 38–73)
NRBC BLD-RTO: 0 /100 WBC
PLATELET # BLD AUTO: 125 K/UL (ref 150–450)
PMV BLD AUTO: 10.1 FL (ref 9.2–12.9)
POCT GLUCOSE: 114 MG/DL (ref 70–110)
POCT GLUCOSE: 139 MG/DL (ref 70–110)
POCT GLUCOSE: 192 MG/DL (ref 70–110)
POCT GLUCOSE: 230 MG/DL (ref 70–110)
POTASSIUM SERPL-SCNC: 4.1 MMOL/L (ref 3.5–5.1)
PROT SERPL-MCNC: 6 G/DL (ref 6–8.4)
RBC # BLD AUTO: 4.36 M/UL (ref 4.6–6.2)
SODIUM SERPL-SCNC: 133 MMOL/L (ref 136–145)
WBC # BLD AUTO: 6.16 K/UL (ref 3.9–12.7)

## 2022-10-09 PROCEDURE — 36415 COLL VENOUS BLD VENIPUNCTURE: CPT | Performed by: STUDENT IN AN ORGANIZED HEALTH CARE EDUCATION/TRAINING PROGRAM

## 2022-10-09 PROCEDURE — 99232 SBSQ HOSP IP/OBS MODERATE 35: CPT | Mod: GC,,, | Performed by: GENERAL ACUTE CARE HOSPITAL

## 2022-10-09 PROCEDURE — 25000003 PHARM REV CODE 250: Performed by: INTERNAL MEDICINE

## 2022-10-09 PROCEDURE — 99232 SBSQ HOSP IP/OBS MODERATE 35: CPT | Mod: ,,, | Performed by: INTERNAL MEDICINE

## 2022-10-09 PROCEDURE — 80053 COMPREHEN METABOLIC PANEL: CPT | Performed by: INTERNAL MEDICINE

## 2022-10-09 PROCEDURE — 83735 ASSAY OF MAGNESIUM: CPT | Performed by: STUDENT IN AN ORGANIZED HEALTH CARE EDUCATION/TRAINING PROGRAM

## 2022-10-09 PROCEDURE — 99232 PR SUBSEQUENT HOSPITAL CARE,LEVL II: ICD-10-PCS | Mod: ,,, | Performed by: INTERNAL MEDICINE

## 2022-10-09 PROCEDURE — 99232 PR SUBSEQUENT HOSPITAL CARE,LEVL II: ICD-10-PCS | Mod: GC,,, | Performed by: GENERAL ACUTE CARE HOSPITAL

## 2022-10-09 PROCEDURE — 25000003 PHARM REV CODE 250: Performed by: STUDENT IN AN ORGANIZED HEALTH CARE EDUCATION/TRAINING PROGRAM

## 2022-10-09 PROCEDURE — 11000001 HC ACUTE MED/SURG PRIVATE ROOM

## 2022-10-09 PROCEDURE — 82010 KETONE BODYS QUAN: CPT | Performed by: STUDENT IN AN ORGANIZED HEALTH CARE EDUCATION/TRAINING PROGRAM

## 2022-10-09 PROCEDURE — 85025 COMPLETE CBC W/AUTO DIFF WBC: CPT | Performed by: STUDENT IN AN ORGANIZED HEALTH CARE EDUCATION/TRAINING PROGRAM

## 2022-10-09 RX ORDER — TAMSULOSIN HYDROCHLORIDE 0.4 MG/1
0.4 CAPSULE ORAL DAILY
Status: DISCONTINUED | OUTPATIENT
Start: 2022-10-09 | End: 2022-10-11 | Stop reason: HOSPADM

## 2022-10-09 RX ORDER — LOSARTAN POTASSIUM 25 MG/1
25 TABLET ORAL DAILY
Status: DISCONTINUED | OUTPATIENT
Start: 2022-10-09 | End: 2022-10-10

## 2022-10-09 RX ADMIN — INSULIN ASPART 12 UNITS: 100 INJECTION, SOLUTION INTRAVENOUS; SUBCUTANEOUS at 05:10

## 2022-10-09 RX ADMIN — INSULIN ASPART 12 UNITS: 100 INJECTION, SOLUTION INTRAVENOUS; SUBCUTANEOUS at 01:10

## 2022-10-09 RX ADMIN — METHOCARBAMOL 500 MG: 500 TABLET ORAL at 05:10

## 2022-10-09 RX ADMIN — LOPERAMIDE HYDROCHLORIDE 2 MG: 2 CAPSULE ORAL at 08:10

## 2022-10-09 RX ADMIN — CLOPIDOGREL 75 MG: 75 TABLET, FILM COATED ORAL at 08:10

## 2022-10-09 RX ADMIN — OXYCODONE 5 MG: 5 TABLET ORAL at 05:10

## 2022-10-09 RX ADMIN — METHOCARBAMOL 500 MG: 500 TABLET ORAL at 08:10

## 2022-10-09 RX ADMIN — APIXABAN 5 MG: 5 TABLET, FILM COATED ORAL at 08:10

## 2022-10-09 RX ADMIN — INSULIN ASPART 1 UNITS: 100 INJECTION, SOLUTION INTRAVENOUS; SUBCUTANEOUS at 05:10

## 2022-10-09 RX ADMIN — INSULIN ASPART 12 UNITS: 100 INJECTION, SOLUTION INTRAVENOUS; SUBCUTANEOUS at 08:10

## 2022-10-09 RX ADMIN — LACOSAMIDE 100 MG: 50 TABLET, FILM COATED ORAL at 08:10

## 2022-10-09 RX ADMIN — TAMSULOSIN HYDROCHLORIDE 0.4 MG: 0.4 CAPSULE ORAL at 11:10

## 2022-10-09 RX ADMIN — LOSARTAN POTASSIUM 25 MG: 25 TABLET, FILM COATED ORAL at 01:10

## 2022-10-09 RX ADMIN — PANTOPRAZOLE SODIUM 40 MG: 40 TABLET, DELAYED RELEASE ORAL at 08:10

## 2022-10-09 RX ADMIN — INSULIN DETEMIR 6 UNITS: 100 INJECTION, SOLUTION SUBCUTANEOUS at 08:10

## 2022-10-09 RX ADMIN — AMIODARONE HYDROCHLORIDE 200 MG: 200 TABLET ORAL at 08:10

## 2022-10-09 RX ADMIN — SERTRALINE HYDROCHLORIDE 50 MG: 50 TABLET ORAL at 08:10

## 2022-10-09 RX ADMIN — INSULIN ASPART 1 UNITS: 100 INJECTION, SOLUTION INTRAVENOUS; SUBCUTANEOUS at 01:10

## 2022-10-09 RX ADMIN — AMOXICILLIN AND CLAVULANATE POTASSIUM 1 TABLET: 875; 125 TABLET, FILM COATED ORAL at 08:10

## 2022-10-09 RX ADMIN — ATORVASTATIN CALCIUM 40 MG: 40 TABLET, FILM COATED ORAL at 08:10

## 2022-10-09 RX ADMIN — GABAPENTIN 100 MG: 100 CAPSULE ORAL at 08:10

## 2022-10-09 RX ADMIN — METOPROLOL SUCCINATE 12.5 MG: 25 TABLET, EXTENDED RELEASE ORAL at 11:10

## 2022-10-09 RX ADMIN — LIDOCAINE 1 PATCH: 50 PATCH CUTANEOUS at 08:10

## 2022-10-09 RX ADMIN — METHOCARBAMOL 500 MG: 500 TABLET ORAL at 01:10

## 2022-10-09 NOTE — ASSESSMENT & PLAN NOTE
BRENDAN Resolved  Cr on admission 3.1 with baseline 0.9-1.1  Improving, now at baseline  Taking renal function into consideration daily when adjusting insulin doses

## 2022-10-09 NOTE — SUBJECTIVE & OBJECTIVE
Interval HPI:   Overnight events: No acute events o/n. BG 130s-170s on current regimen.   Eatin%  Nausea: No  Hypoglycemia and intervention: No  Fever: No  TPN and/or TF: No  If yes, type of TF/TPN and rate: n/a    BP (!) 180/98 (BP Location: Right arm, Patient Position: Lying)   Pulse 66   Temp 98.7 °F (37.1 °C) (Oral)   Resp 18   Wt 86.2 kg (190 lb)   SpO2 95%   BMI 24.39 kg/m²     Labs Reviewed and Include    Recent Labs   Lab 10/09/22  0745   *   CALCIUM 8.5*   ALBUMIN 2.4*   PROT 6.0   *   K 4.1   CO2 22*      BUN 12   CREATININE 0.8   ALKPHOS 124   ALT 21   AST 26   BILITOT 0.5     Lab Results   Component Value Date    WBC 6.16 10/09/2022    HGB 12.9 (L) 10/09/2022    HCT 40.7 10/09/2022    MCV 93 10/09/2022     (L) 10/09/2022     No results for input(s): TSH, FREET4 in the last 168 hours.  Lab Results   Component Value Date    HGBA1C 10.6 (H) 10/05/2022       Nutritional status:   Body mass index is 24.39 kg/m².  Lab Results   Component Value Date    ALBUMIN 2.4 (L) 10/09/2022    ALBUMIN 2.3 (L) 10/08/2022    ALBUMIN 2.4 (L) 10/07/2022     No results found for: PREALBUMIN    Estimated Creatinine Clearance: 87.1 mL/min (based on SCr of 0.8 mg/dL).    Accu-Checks  Recent Labs     10/07/22  1613 10/07/22  1634 10/07/22  1711 10/07/22  1830 10/07/22  2034 10/08/22  0719 10/08/22  1104 10/08/22  1615 10/08/22  1925 10/09/22  0703   POCTGLUCOSE 66* 61* 84 154* 223* 229* 166* 178* 137* 139*       Current Medications and/or Treatments Impacting Glycemic Control  Immunotherapy:    Immunosuppressants       None          Steroids:   Hormones (From admission, onward)      None          Pressors:    Autonomic Drugs (From admission, onward)      Start     Stop Route Frequency Ordered    10/09/22 1030  metoprolol succinate (TOPROL-XL) 24 hr split tablet 12.5 mg         -- Oral Daily 10/09/22 1015          Hyperglycemia/Diabetes Medications:   Antihyperglycemics (From admission, onward)       Start     Stop Route Frequency Ordered    10/08/22 0900  insulin detemir U-100 pen 6 Units         -- SubQ 2 times daily 10/07/22 1046    10/07/22 1730  insulin aspart U-100 pen 8-12 Units         -- SubQ 3 times daily with meals 10/07/22 1715    10/07/22 1145  insulin aspart U-100 pen 0-4 Units         -- SubQ As needed (PRN) 10/07/22 1046

## 2022-10-09 NOTE — PROGRESS NOTES
[facility] Medicine  Progress Note    Patient Name: Kamar Muñoz  MRN: [id number]  Patient Class: IP- Inpatient   Admission Date: 10/5/2022  Length of Stay: 4 days  Attending Physician: Israel Escamilla MD  Primary Care Provider: Basim Guerrero MD        Subjective:     Principal Problem:Septic shock        HPI:     Mr. Muñoz is a 77 y.o M/ w/ hx of stroke, T2DM(poorly controlled with recurrent DKA), CAD, HLD, paroxysmal Afib, and seizures who presented to the ED via EMS from [facility] with concerns of nausea/vomiting, hypotension, hyperglycemia. Per daughter, he has been depressed and has had very poor oral intake since his wife passed away 4 weeks ago.  He has not been taking care of himself. He was seen by his PCP 3 days ago who adjusted his anitdepressants dose recently. He also had a fall last Friday while he was getting dressed that was described as mild. It was not associated with head trauma, as most of fall was on his elbow.      ED course notable for hyperglycemia, keotacidosis, hypotension requiring vasopressors. Critical Care Medicine was consulted for evaluation.       Overview/Hospital Course:  No acute events overnight, afebrile. Weaned off vasopressors. DKA improving. He is more alert and oriented and appears back to his baseline.         Interval History: Patient lying in bed, no acute distress. Son at bedside. Alert and oriented x 3. Reports back pain better controlled after adding robaxin. Does note pain also in upper left back. BG controlled with insulin adjustments by endocrinology. Boost TID ordered and PT/OT recommending SNF so primary team will work with  to determine if patient's insurance will approve discharge to [facility].      Review of Systems   Constitutional:  Positive for activity change and fatigue. Negative for chills and fever.   HENT:  Negative for congestion and trouble swallowing.    Eyes:  Negative for visual  disturbance.   Respiratory:  Negative for cough and shortness of breath.    Cardiovascular:  Negative for chest pain and leg swelling.   Gastrointestinal:  Negative for abdominal distention, abdominal pain, nausea and vomiting.   Endocrine: Negative for cold intolerance, heat intolerance, polydipsia and polyuria.   Genitourinary:  Negative for difficulty urinating and dysuria.   Musculoskeletal:  Positive for back pain. Negative for myalgias.   Skin:  Negative for rash and wound.   Neurological:  Positive for weakness. Negative for dizziness and light-headedness.   Hematological:  Negative for adenopathy. Does not bruise/bleed easily.   Psychiatric/Behavioral:  Negative for confusion and sleep disturbance.      Objective:     Vital Signs (Most Recent):  Temp: 97.5 °F (36.4 °C) (10/09/22 1533)  Pulse: 90 (10/09/22 1533)  Resp: 16 (10/09/22 1533)  BP: (!) 103/58 (10/09/22 1533)  SpO2: 95 % (10/09/22 1533)   Vital Signs (24h Range):  Temp:  [97 °F (36.1 °C)-98.7 °F (37.1 °C)] 97.5 °F (36.4 °C)  Pulse:  [63-90] 90  Resp:  [16-18] 16  SpO2:  [95 %-97 %] 95 %  BP: (103-180)/(58-98) 103/58     Weight: 86.2 kg (190 lb)  Body mass index is 24.39 kg/m².    Intake/Output Summary (Last 24 hours) at 10/9/2022 1715  Last data filed at 10/9/2022 0755  Gross per 24 hour   Intake 350 ml   Output 1002 ml   Net -652 ml        Physical Exam  Constitutional:       Appearance: He is well-developed.   HENT:      Head: Normocephalic and atraumatic.   Eyes:      General: No scleral icterus.     Pupils: Pupils are equal, round, and reactive to light.   Neck:      Vascular: No JVD.   Cardiovascular:      Rate and Rhythm: Normal rate and regular rhythm.      Heart sounds: No murmur heard.    No friction rub. No gallop.   Pulmonary:      Effort: Pulmonary effort is normal. No respiratory distress.      Breath sounds: Normal breath sounds. No wheezing or rales.   Abdominal:      General: Bowel sounds are normal. There is no distension.       Palpations: Abdomen is soft.      Tenderness: There is no abdominal tenderness. There is no guarding or rebound.   Musculoskeletal:         General: No deformity. Normal range of motion.      Cervical back: Neck supple.   Lymphadenopathy:      Cervical: No cervical adenopathy.   Skin:     General: Skin is warm and dry.      Capillary Refill: Capillary refill takes less than 2 seconds.      Findings: No erythema or rash.   Neurological:      Mental Status: He is alert and oriented to person, place, and time.      Cranial Nerves: No cranial nerve deficit.      Sensory: No sensory deficit.   Psychiatric:         Judgment: Judgment normal.       Significant Labs: A1C:   Recent Labs   Lab 10/05/22  1724   HGBA1C 10.6*       CBC:   Recent Labs   Lab 10/08/22  0432 10/09/22  0745   WBC 7.04 6.16   HGB 11.9* 12.9*   HCT 35.6* 40.7   * 125*       CMP:   Recent Labs   Lab 10/08/22  0432 10/09/22  0745   * 133*   K 3.8 4.1    103   CO2 21* 22*   * 139*   BUN 21 12   CREATININE 1.0 0.8   CALCIUM 8.2* 8.5*   PROT 5.4* 6.0   ALBUMIN 2.3* 2.4*   BILITOT 0.4 0.5   ALKPHOS 109 124   AST 28 26   ALT 22 21   ANIONGAP 9 8         Significant Imaging: I have reviewed all pertinent imaging results/findings within the past 24 hours.      Assessment/Plan:      Positive blood cultures  -CXR nonacute. CT abdomen concerning for stercoral colitis.  -blood cultures grew bacillus in 1 out of 2 bottles and likely a contaminate         Blood in stool  -Hx of apixaban and clopidogrel use.   -Overnight on 10/06 nursing noted blood in stool. CT abdomen noted stercoral proctitis.  -Hgb drop, but unclear if from improvement in DKA associated dehydration vs true bleed      Pneumocephalus  -Per radiology, differential considerations include intravenous induced pneumocephalus or barotrauma (i.e. nose blowing).  Septic thrombosis or craniofacial trauma felt less likely.   - no active issues      Lactic acidosis  Lactic acidosis on  admission likely secondary to dehydration from DKA  - RESOLVED      Goals of care, counseling/discussion  Advance Care Planning     Code Status  In light of the patients advanced and life limiting illness,I engaged the the patient and family in a conversation about the patient's preferences for care  at the very end of life. The patient wishes to have a natural, peaceful death.  Along those lines, the patient does not wish to have CPR or other invasive treatments performed when his heart and/or breathing stops. I communicated to the patient and family that a DNR order would be placed in his medical record to reflect this preference.  I spent a total of 45 minutes engaging the patient in this advance care planning discussion.         Chronic anticoagulation  - Continue home apixaban      Debility  - PT/OT recommending SNF      Adjustment disorder with disturbance of conduct  - Recently worsening depression due to loss of spouse in 2022  - Continue home Sertraline      Diabetic ketoacidosis without coma associated with type 2 diabetes mellitus  Patient's FSGs are controlled on current medication regimen.  Last A1c reviewed-   Lab Results   Component Value Date    HGBA1C 10.6 (H) 10/05/2022     Most recent fingerstick glucose reviewed-   Recent Labs   Lab 10/07/22  2034 10/08/22  0719 10/08/22  1104 10/08/22  1615   POCTGLUCOSE 223* 229* 166* 178*     Current correctional scale  Low  Maintain anti-hyperglycemic dose as follows-   Antihyperglycemics (From admission, onward)    Start     Stop Route Frequency Ordered    10/08/22 0900  insulin detemir U-100 pen 6 Units         -- SubQ 2 times daily 10/07/22 1046    10/07/22 1730  insulin aspart U-100 pen 8-12 Units         -- SubQ 3 times daily with meals 10/07/22 1715    10/07/22 1145  insulin aspart U-100 pen 0-4 Units         -- SubQ As needed (PRN) 10/07/22 1046        Hold Oral hypoglycemics while patient is in the h  ospital.  - endocrinology consulted, apprec recs  "  -- P/w DKA and shock, initially requiring vasopressors   -- Suspect infection as trigger for DKA? Unclear source as +BCx suspected to be contaminant  -- completed insulin gtt and DKA now resolved   -- continue Levemir 6 units twice daily   - continue Aspart 8-12 unitt based on % of meal eaten -- 8 units if patient eats < or =25%, 10 units if pt eats 50% and 12 units if patient eats >50%.  -- Low dose correction scale prn AC and snacks with parameters to not administer aspart doses <4hrs apart       Ketosis-prone diabetes mellitus  - see "DKA"    History of partial seizures  - history of focal seizures and history stroke  -Continue home Lacosamide  -Followup Lacosamide levels         Encephalopathy, metabolic  Patient has acute metabolic encephalopathy that is likely secondary to DKA, dehydration.  Treatment for underlying cause is underway.      CT head did not note any acute infarct or intracranial hemorrhage.      PLAN  -DDx: hyperglycemia vs delirium vs UTI   - DKA treatment  -Followup Blood cultures grew bacillus in 1 out of 2 bottles which was likely contaminate  - urinalysis positive for UTI. Ucx showed no predominant organisms. Switched from zosyn to augmentin  -Followup Lacosamide levels  -Will monitor neuro checks carefully, avoid narcotics and benzos that will exacerbate agitation, and use PRN anti-psychotics for controls of behavior for self harm.      History of ST elevation myocardial infarction (STEMI)  See "Coronary artery disease involving native coronary artery of native heart with unstable angina pectoris"      Paroxysmal atrial fibrillation  - EKG on admit sinus rhythm  - Continue home amiodarone, held home metoprolol due to hypotension. Will resume when clinically appropriate          Coronary artery disease involving native coronary artery of native heart with unstable angina pectoris    -Hx of CAD s/p placement of 2 LAUREN in RCA in 01/09/2022.   -Patient denied chest pain on admission, troponin " "WNL  -EKG on admission(10/05) Sinus rhythm with 1st degree A-V block  -Per chart review, "ASA discontinued 4/13 due to bleeding risk with triple therapy; continuing Plavix and Eliquis" "No need for triple therapy (ASA/Plavix/eliquis) beyond 1 month"     PLAN  -Continue home statin  -Continue home apixaban, clopidogrel   -Metoprolol and Losartan held due to hypotension. Will resume with clinically appropriate       Type 2 diabetes mellitus with hyperglycemia, with long-term current use of insulin  Patient's FSGs are uncontrolled due to hyperglycemia on current medication regimen.  Last A1c reviewed-   Lab Results   Component Value Date    HGBA1C 10.6 (H) 10/05/2022     Most recent fingerstick glucose reviewed-   Recent Labs   Lab 10/07/22  2034 10/08/22  0719 10/08/22  1104 10/08/22  1615   POCTGLUCOSE 223* 229* 166* 178*     Current correctional scale  Medium  Maintain anti-hyperglycemic dose as follows-   Antihyperglycemics (From admission, onward)    Start     Stop Route Frequency Ordered    10/08/22 0900  insulin detemir U-100 pen 6 Units         -- SubQ 2 times daily 10/07/22 1046    10/07/22 1730  insulin aspart U-100 pen 8-12 Units         -- SubQ 3 times daily with meals 10/07/22 1715    10/07/22 1145  insulin aspart U-100 pen 0-4 Units         -- SubQ As needed (PRN) 10/07/22 1046        Hold Oral hypoglycemics while patient is in the hospital.    Essential hypertension  - holding home Metoprolol, losartan due to hypotension. Resumed as clinically appropriate        VTE Risk Mitigation (From admission, onward)         Ordered     apixaban tablet 5 mg  2 times daily         10/05/22 2325     IP VTE HIGH RISK PATIENT  Once         10/05/22 1806     Place LUANN hose  Until discontinued         10/05/22 1806     Place sequential compression device  Until discontinued         10/05/22 1806     Place sequential compression device  Until discontinued         10/05/22 1754                Discharge Planning   ALLIE: " 10/11/2022     Code Status: DNR   Is the patient medically ready for discharge?: Yes    Reason for patient still in hospital (select all that apply): Patient trending condition, Laboratory test, Treatment, Consult recommendations and Pending disposition  Discharge Plan A: Return to nursing home          Time spent in care of patient: > 35 minutes           Israel Escamilla MD  Department of Hospital Medicine   [facility]

## 2022-10-09 NOTE — PROGRESS NOTES
[facility] - Med Surg  Endocrinology  Progress Note    Admit Date: 10/5/2022     Reason for Consult: Management of T2DM, Hyperglycemia, DKA on admission - resolved at time of endocrine consult    Surgical Procedure and Date: n/a    Diabetes diagnosis year: >20yrs ago    Home Diabetes Medications:  In a nursing home now. Last admission 5/2022 final endocrine recs from inpatient team for insulin regimen at [facility] were as follows:  - Levemir 6 units twice daily and on insulin aspart 8-16 units (please give 8 units if eats <25%, 12 units with 50 % intake and full dose if eats 100%),  in addition to moderate correction with with meals  -  Aspart 4 units PRN in place for nightly and in between meal snacks  *Fixed doses of insulin we have recommended are to cover meals containing 60 g of carbohydrate and it is very important that meals are consistently containing 60 g of carbs.  If patient exceed 60 g of carbohydrate per meal or consumes any carbohydrates between meals without insulin coverage this will likely again lead to significant hyperglycemia. In addition if he eats < 50 % of meal to only give half the recommended pre-meal dose.      How often checking glucose at home? >4 x day   BG readings on regimen: >200  Hypoglycemia on the regimen?  Yes  Missed doses on regimen?  No    Diabetes Complications include:     Hyperglycemia, Hypoglycemia , and Diabetic chronic kidney disease         Complicating diabetes co morbidities:   History of MI, History of CVA, and CKD    HPI:   Mr. Muñoz is a 76yo M w/ hx of stroke, ketosis prone T2DM (poorly controlled with recurrent DKA), CAD, HLD, paroxysmal Afib, and seizures who presented to the ED via EMS from [facility] with concerns of nausea/vomiting, hypotension, hyperglycemia. Per daughter, he has been depressed and has had very poor oral intake since his wife passed away 4 weeks ago.  He has not been taking care of himself. He was seen by his PCP 3 days prior to  admission, who adjusted his anitdepressants dose. He also had a fall last Friday while he was getting dressed that was described as mild. It was not associated with head trauma, as most of fall was on his elbow.      ED course notable for hyperglycemia, keotacidosis, hypotension requiring vasopressors. He was admitted  to the MICU initially in septic shock, significant lactic acidosis, and DKA. Infectious workup revealed +Bcx. For DKA he was started on the intensive insulin protocol for DKA. He was transitioned to MDI on 10/6 and stepped down to hospital medicine. Endocrine was consulted for BG management.         Interval HPI:   Overnight events: No acute events o/n. BG 130s-170s last 24h.  Eatin%  Nausea: No  Hypoglycemia and intervention: No  Fever: No  TPN and/or TF: No  If yes, type of TF/TPN and rate: n/a    BP (!) 180/98 (BP Location: Right arm, Patient Position: Lying)   Pulse 66   Temp 98.7 °F (37.1 °C) (Oral)   Resp 18   Wt 86.2 kg (190 lb)   SpO2 95%   BMI 24.39 kg/m²     Labs Reviewed and Include    Recent Labs   Lab 10/09/22  0745   *   CALCIUM 8.5*   ALBUMIN 2.4*   PROT 6.0   *   K 4.1   CO2 22*      BUN 12   CREATININE 0.8   ALKPHOS 124   ALT 21   AST 26   BILITOT 0.5     Lab Results   Component Value Date    WBC 6.16 10/09/2022    HGB 12.9 (L) 10/09/2022    HCT 40.7 10/09/2022    MCV 93 10/09/2022     (L) 10/09/2022     No results for input(s): TSH, FREET4 in the last 168 hours.  Lab Results   Component Value Date    HGBA1C 10.6 (H) 10/05/2022       Nutritional status:   Body mass index is 24.39 kg/m².  Lab Results   Component Value Date    ALBUMIN 2.4 (L) 10/09/2022    ALBUMIN 2.3 (L) 10/08/2022    ALBUMIN 2.4 (L) 10/07/2022     No results found for: PREALBUMIN    Estimated Creatinine Clearance: 87.1 mL/min (based on SCr of 0.8 mg/dL).    Accu-Checks  Recent Labs     10/07/22  1613 10/07/22  1634 10/07/22  1711 10/07/22  1830 10/07/22  2034 10/08/22  0719  10/08/22  1104 10/08/22  1615 10/08/22  1925 10/09/22  0703   POCTGLUCOSE 66* 61* 84 154* 223* 229* 166* 178* 137* 139*       Current Medications and/or Treatments Impacting Glycemic Control  Immunotherapy:    Immunosuppressants       None          Steroids:   Hormones (From admission, onward)      None          Pressors:    Autonomic Drugs (From admission, onward)      Start     Stop Route Frequency Ordered    10/09/22 1030  metoprolol succinate (TOPROL-XL) 24 hr split tablet 12.5 mg         -- Oral Daily 10/09/22 1015          Hyperglycemia/Diabetes Medications:   Antihyperglycemics (From admission, onward)      Start     Stop Route Frequency Ordered    10/08/22 0900  insulin detemir U-100 pen 6 Units         -- SubQ 2 times daily 10/07/22 1046    10/07/22 1730  insulin aspart U-100 pen 8-12 Units         -- SubQ 3 times daily with meals 10/07/22 1715    10/07/22 1145  insulin aspart U-100 pen 0-4 Units         -- SubQ As needed (PRN) 10/07/22 1046            ASSESSMENT and PLAN    * Septic shock-resolved as of 10/9/2022  Septic shock on admission - likely trigger for DKA. Shock now resolved and pt was stepped down out of the ICU.  Management per primary      Diabetic ketoacidosis without coma associated with type 2 diabetes mellitus  P/w DKA and shock, initially requiring vasopressors   Suspect infection as trigger for DKA? Unclear source as +BCx suspected to be contaminant  DKA now resolved       Type 2 diabetes mellitus with hyperglycemia, with long-term current use of insulin  Ketosis-prone T2DM on long term insulin  Multiple admissions for DKA   Upon transitioning him from intensive insulin gtt to MDI, he had hypoglycemia related to initial subQ regimen with very large increase in doses compared to last endocrine recommended regimen in 2022  After regimen adjusted, no longer having lows  24h Bs-170s    - Continue Levemir 6 units twice daily   - Continue Aspart 8-12 units based on % of meal eaten -- 8  units if patient eats < or =25%, 10 units if pt eats 50% and 12 units if patient eats >50%.  - Low dose correction scale prn AC and snacks with parameters to not administer aspart doses <4hrs apart    - Hypoglycemia protocol in place  - If blood glucose greater than 300, please ask patient not to eat food or drink anything other than water until correctional insulin has brought it back below 250  - **Please ensure patient receives their p.r.n. insulin aspart if they eat a snack with more than 30 g of carbohydrate    ** Please notify Endocrine for any change and/or advance in diet**  ** Please call Endocrine for any BG related issues **        BRENDAN (acute kidney injury)-resolved as of 10/9/2022  BRENDAN Resolved  Cr on admission 3.1 with baseline 0.9-1.1  Improving, now at baseline  Taking renal function into consideration daily when adjusting insulin doses        Sanjuana Garcia MD  Endocrinology  [facility] - Med Surg

## 2022-10-09 NOTE — SUBJECTIVE & OBJECTIVE
Interval History: Patient lying in bed, no acute distress. Son at bedside. Alert and oriented x 3. Reports back pain better controlled after adding robaxin. Does note pain also in upper left back. BG controlled with insulin adjustments by endocrinology. Boost TID ordered and PT/OT recommending SNF so primary team will work with  to determine if patient's insurance will approve discharge to [facility].      Review of Systems   Constitutional:  Positive for activity change and fatigue. Negative for chills and fever.   HENT:  Negative for congestion and trouble swallowing.    Eyes:  Negative for visual disturbance.   Respiratory:  Negative for cough and shortness of breath.    Cardiovascular:  Negative for chest pain and leg swelling.   Gastrointestinal:  Negative for abdominal distention, abdominal pain, nausea and vomiting.   Endocrine: Negative for cold intolerance, heat intolerance, polydipsia and polyuria.   Genitourinary:  Negative for difficulty urinating and dysuria.   Musculoskeletal:  Positive for back pain. Negative for myalgias.   Skin:  Negative for rash and wound.   Neurological:  Positive for weakness. Negative for dizziness and light-headedness.   Hematological:  Negative for adenopathy. Does not bruise/bleed easily.   Psychiatric/Behavioral:  Negative for confusion and sleep disturbance.      Objective:     Vital Signs (Most Recent):  Temp: 97.5 °F (36.4 °C) (10/09/22 1533)  Pulse: 90 (10/09/22 1533)  Resp: 16 (10/09/22 1533)  BP: (!) 103/58 (10/09/22 1533)  SpO2: 95 % (10/09/22 1533)   Vital Signs (24h Range):  Temp:  [97 °F (36.1 °C)-98.7 °F (37.1 °C)] 97.5 °F (36.4 °C)  Pulse:  [63-90] 90  Resp:  [16-18] 16  SpO2:  [95 %-97 %] 95 %  BP: (103-180)/(58-98) 103/58     Weight: 86.2 kg (190 lb)  Body mass index is 24.39 kg/m².    Intake/Output Summary (Last 24 hours) at 10/9/2022 1715  Last data filed at 10/9/2022 0755  Gross per 24 hour   Intake 350 ml   Output 1002 ml   Net -652 ml         Physical Exam  Constitutional:       Appearance: He is well-developed.   HENT:      Head: Normocephalic and atraumatic.   Eyes:      General: No scleral icterus.     Pupils: Pupils are equal, round, and reactive to light.   Neck:      Vascular: No JVD.   Cardiovascular:      Rate and Rhythm: Normal rate and regular rhythm.      Heart sounds: No murmur heard.    No friction rub. No gallop.   Pulmonary:      Effort: Pulmonary effort is normal. No respiratory distress.      Breath sounds: Normal breath sounds. No wheezing or rales.   Abdominal:      General: Bowel sounds are normal. There is no distension.      Palpations: Abdomen is soft.      Tenderness: There is no abdominal tenderness. There is no guarding or rebound.   Musculoskeletal:         General: No deformity. Normal range of motion.      Cervical back: Neck supple.   Lymphadenopathy:      Cervical: No cervical adenopathy.   Skin:     General: Skin is warm and dry.      Capillary Refill: Capillary refill takes less than 2 seconds.      Findings: No erythema or rash.   Neurological:      Mental Status: He is alert and oriented to person, place, and time.      Cranial Nerves: No cranial nerve deficit.      Sensory: No sensory deficit.   Psychiatric:         Judgment: Judgment normal.       Significant Labs: A1C:   Recent Labs   Lab 10/05/22  1724   HGBA1C 10.6*       CBC:   Recent Labs   Lab 10/08/22  0432 10/09/22  0745   WBC 7.04 6.16   HGB 11.9* 12.9*   HCT 35.6* 40.7   * 125*       CMP:   Recent Labs   Lab 10/08/22  0432 10/09/22  0745   * 133*   K 3.8 4.1    103   CO2 21* 22*   * 139*   BUN 21 12   CREATININE 1.0 0.8   CALCIUM 8.2* 8.5*   PROT 5.4* 6.0   ALBUMIN 2.3* 2.4*   BILITOT 0.4 0.5   ALKPHOS 109 124   AST 28 26   ALT 22 21   ANIONGAP 9 8         Significant Imaging: I have reviewed all pertinent imaging results/findings within the past 24 hours.

## 2022-10-09 NOTE — ASSESSMENT & PLAN NOTE
Ketosis-prone T2DM on long term insulin  Multiple admissions for DKA   Upon transitioning him from intensive insulin gtt to MDI, he had hypoglycemia related to initial subQ regimen with very large increase in doses compared to last endocrine recommended regimen in 2022  After regimen adjusted, no longer having lows  24h Bs-170s    - Continue Levemir 6 units twice daily   - Continue Aspart 8-12 units based on % of meal eaten -- 8 units if patient eats < or =25%, 10 units if pt eats 50% and 12 units if patient eats >50%.  - Low dose correction scale prn AC and snacks with parameters to not administer aspart doses <4hrs apart    - Hypoglycemia protocol in place  - If blood glucose greater than 300, please ask patient not to eat food or drink anything other than water until correctional insulin has brought it back below 250  - **Please ensure patient receives their p.r.n. insulin aspart if they eat a snack with more than 30 g of carbohydrate    ** Please notify Endocrine for any change and/or advance in diet**  ** Please call Endocrine for any BG related issues **

## 2022-10-09 NOTE — PLAN OF CARE
Bp elevated in the AM. After BP meds, BP went back down. Still complains of back pain with movement/turning. Given scheduled robaxin & prn pain med. BS fairy stable with pt eating most of his meals.               Problem: Diabetic Ketoacidosis  Goal: Fluid and Electrolyte Balance with Absence of Ketosis  Outcome: Ongoing, Progressing     Problem: Adult Inpatient Plan of Care  Goal: Plan of Care Review  Outcome: Ongoing, Progressing  Goal: Patient-Specific Goal (Individualized)  Outcome: Ongoing, Progressing  Goal: Absence of Hospital-Acquired Illness or Injury  Outcome: Ongoing, Progressing  Goal: Optimal Comfort and Wellbeing  Outcome: Ongoing, Progressing  Goal: Readiness for Transition of Care  Outcome: Ongoing, Progressing     Problem: Diabetes Comorbidity  Goal: Blood Glucose Level Within Targeted Range  Outcome: Ongoing, Progressing     Problem: Fluid and Electrolyte Imbalance (Acute Kidney Injury/Impairment)  Goal: Fluid and Electrolyte Balance  Outcome: Ongoing, Progressing     Problem: Oral Intake Inadequate (Acute Kidney Injury/Impairment)  Goal: Optimal Nutrition Intake  Outcome: Ongoing, Progressing     Problem: Renal Function Impairment (Acute Kidney Injury/Impairment)  Goal: Effective Renal Function  Outcome: Ongoing, Progressing     Problem: Impaired Wound Healing  Goal: Optimal Wound Healing  Outcome: Ongoing, Progressing     Problem: Fall Injury Risk  Goal: Absence of Fall and Fall-Related Injury  Outcome: Ongoing, Progressing     Problem: Skin Injury Risk Increased  Goal: Skin Health and Integrity  Outcome: Ongoing, Progressing     Problem: Adjustment to Illness (Sepsis/Septic Shock)  Goal: Optimal Coping  Outcome: Ongoing, Progressing     Problem: Bleeding (Sepsis/Septic Shock)  Goal: Absence of Bleeding  Outcome: Ongoing, Progressing     Problem: Glycemic Control Impaired (Sepsis/Septic Shock)  Goal: Blood Glucose Level Within Desired Range  Outcome: Ongoing, Progressing     Problem: Infection  Progression (Sepsis/Septic Shock)  Goal: Absence of Infection Signs and Symptoms  Outcome: Ongoing, Progressing     Problem: Nutrition Impaired (Sepsis/Septic Shock)  Goal: Optimal Nutrition Intake  Outcome: Ongoing, Progressing

## 2022-10-09 NOTE — SUBJECTIVE & OBJECTIVE
Interval HPI:   Overnight events: No acute events o/n. BG 130s-170s last 24h.  Eatin%  Nausea: No  Hypoglycemia and intervention: No  Fever: No  TPN and/or TF: No  If yes, type of TF/TPN and rate: n/a    BP (!) 180/98 (BP Location: Right arm, Patient Position: Lying)   Pulse 66   Temp 98.7 °F (37.1 °C) (Oral)   Resp 18   Wt 86.2 kg (190 lb)   SpO2 95%   BMI 24.39 kg/m²     Labs Reviewed and Include    Recent Labs   Lab 10/09/22  0745   *   CALCIUM 8.5*   ALBUMIN 2.4*   PROT 6.0   *   K 4.1   CO2 22*      BUN 12   CREATININE 0.8   ALKPHOS 124   ALT 21   AST 26   BILITOT 0.5     Lab Results   Component Value Date    WBC 6.16 10/09/2022    HGB 12.9 (L) 10/09/2022    HCT 40.7 10/09/2022    MCV 93 10/09/2022     (L) 10/09/2022     No results for input(s): TSH, FREET4 in the last 168 hours.  Lab Results   Component Value Date    HGBA1C 10.6 (H) 10/05/2022       Nutritional status:   Body mass index is 24.39 kg/m².  Lab Results   Component Value Date    ALBUMIN 2.4 (L) 10/09/2022    ALBUMIN 2.3 (L) 10/08/2022    ALBUMIN 2.4 (L) 10/07/2022     No results found for: PREALBUMIN    Estimated Creatinine Clearance: 87.1 mL/min (based on SCr of 0.8 mg/dL).    Accu-Checks  Recent Labs     10/07/22  1613 10/07/22  1634 10/07/22  1711 10/07/22  1830 10/07/22  2034 10/08/22  0719 10/08/22  1104 10/08/22  1615 10/08/22  1925 10/09/22  0703   POCTGLUCOSE 66* 61* 84 154* 223* 229* 166* 178* 137* 139*       Current Medications and/or Treatments Impacting Glycemic Control  Immunotherapy:    Immunosuppressants       None          Steroids:   Hormones (From admission, onward)      None          Pressors:    Autonomic Drugs (From admission, onward)      Start     Stop Route Frequency Ordered    10/09/22 1030  metoprolol succinate (TOPROL-XL) 24 hr split tablet 12.5 mg         -- Oral Daily 10/09/22 1015          Hyperglycemia/Diabetes Medications:   Antihyperglycemics (From admission, onward)      Start      Stop Route Frequency Ordered    10/08/22 0900  insulin detemir U-100 pen 6 Units         -- SubQ 2 times daily 10/07/22 1046    10/07/22 1730  insulin aspart U-100 pen 8-12 Units         -- SubQ 3 times daily with meals 10/07/22 1715    10/07/22 1145  insulin aspart U-100 pen 0-4 Units         -- SubQ As needed (PRN) 10/07/22 1046

## 2022-10-10 LAB
ALBUMIN SERPL BCP-MCNC: 2.4 G/DL (ref 3.5–5.2)
ALP SERPL-CCNC: 121 U/L (ref 55–135)
ALT SERPL W/O P-5'-P-CCNC: 17 U/L (ref 10–44)
ANION GAP SERPL CALC-SCNC: 8 MMOL/L (ref 8–16)
AST SERPL-CCNC: 21 U/L (ref 10–40)
B-OH-BUTYR BLD STRIP-SCNC: 0.2 MMOL/L (ref 0–0.5)
BACTERIA BLD CULT: NORMAL
BASOPHILS # BLD AUTO: 0.03 K/UL (ref 0–0.2)
BASOPHILS NFR BLD: 0.5 % (ref 0–1.9)
BILIRUB SERPL-MCNC: 0.4 MG/DL (ref 0.1–1)
BUN SERPL-MCNC: 13 MG/DL (ref 8–23)
CALCIUM SERPL-MCNC: 8.4 MG/DL (ref 8.7–10.5)
CHLORIDE SERPL-SCNC: 103 MMOL/L (ref 95–110)
CO2 SERPL-SCNC: 23 MMOL/L (ref 23–29)
CREAT SERPL-MCNC: 0.8 MG/DL (ref 0.5–1.4)
DIFFERENTIAL METHOD: ABNORMAL
EOSINOPHIL # BLD AUTO: 0.4 K/UL (ref 0–0.5)
EOSINOPHIL NFR BLD: 6.5 % (ref 0–8)
ERYTHROCYTE [DISTWIDTH] IN BLOOD BY AUTOMATED COUNT: 11.2 % (ref 11.5–14.5)
EST. GFR  (NO RACE VARIABLE): >60 ML/MIN/1.73 M^2
GLUCOSE SERPL-MCNC: 131 MG/DL (ref 70–110)
HCT VFR BLD AUTO: 37.6 % (ref 40–54)
HGB BLD-MCNC: 12.1 G/DL (ref 14–18)
IMM GRANULOCYTES # BLD AUTO: 0.02 K/UL (ref 0–0.04)
IMM GRANULOCYTES NFR BLD AUTO: 0.3 % (ref 0–0.5)
LYMPHOCYTES # BLD AUTO: 1.1 K/UL (ref 1–4.8)
LYMPHOCYTES NFR BLD: 17.8 % (ref 18–48)
MAGNESIUM SERPL-MCNC: 1.6 MG/DL (ref 1.6–2.6)
MCH RBC QN AUTO: 29.5 PG (ref 27–31)
MCHC RBC AUTO-ENTMCNC: 32.2 G/DL (ref 32–36)
MCV RBC AUTO: 92 FL (ref 82–98)
MONOCYTES # BLD AUTO: 0.7 K/UL (ref 0.3–1)
MONOCYTES NFR BLD: 11.2 % (ref 4–15)
NEUTROPHILS # BLD AUTO: 4.1 K/UL (ref 1.8–7.7)
NEUTROPHILS NFR BLD: 63.7 % (ref 38–73)
NRBC BLD-RTO: 0 /100 WBC
PLATELET # BLD AUTO: 142 K/UL (ref 150–450)
PMV BLD AUTO: 10.2 FL (ref 9.2–12.9)
POCT GLUCOSE: 171 MG/DL (ref 70–110)
POCT GLUCOSE: 206 MG/DL (ref 70–110)
POCT GLUCOSE: 262 MG/DL (ref 70–110)
POCT GLUCOSE: 347 MG/DL (ref 70–110)
POTASSIUM SERPL-SCNC: 3.7 MMOL/L (ref 3.5–5.1)
PROT SERPL-MCNC: 5.8 G/DL (ref 6–8.4)
RBC # BLD AUTO: 4.1 M/UL (ref 4.6–6.2)
SODIUM SERPL-SCNC: 134 MMOL/L (ref 136–145)
WBC # BLD AUTO: 6.42 K/UL (ref 3.9–12.7)

## 2022-10-10 PROCEDURE — 36415 COLL VENOUS BLD VENIPUNCTURE: CPT | Performed by: STUDENT IN AN ORGANIZED HEALTH CARE EDUCATION/TRAINING PROGRAM

## 2022-10-10 PROCEDURE — 82010 KETONE BODYS QUAN: CPT | Performed by: STUDENT IN AN ORGANIZED HEALTH CARE EDUCATION/TRAINING PROGRAM

## 2022-10-10 PROCEDURE — 99231 SBSQ HOSP IP/OBS SF/LOW 25: CPT | Mod: ,,, | Performed by: INTERNAL MEDICINE

## 2022-10-10 PROCEDURE — 25000003 PHARM REV CODE 250: Performed by: STUDENT IN AN ORGANIZED HEALTH CARE EDUCATION/TRAINING PROGRAM

## 2022-10-10 PROCEDURE — 99232 PR SUBSEQUENT HOSPITAL CARE,LEVL II: ICD-10-PCS | Mod: GC,,, | Performed by: INTERNAL MEDICINE

## 2022-10-10 PROCEDURE — 85025 COMPLETE CBC W/AUTO DIFF WBC: CPT | Performed by: STUDENT IN AN ORGANIZED HEALTH CARE EDUCATION/TRAINING PROGRAM

## 2022-10-10 PROCEDURE — 83735 ASSAY OF MAGNESIUM: CPT | Performed by: STUDENT IN AN ORGANIZED HEALTH CARE EDUCATION/TRAINING PROGRAM

## 2022-10-10 PROCEDURE — 11000001 HC ACUTE MED/SURG PRIVATE ROOM

## 2022-10-10 PROCEDURE — 99232 SBSQ HOSP IP/OBS MODERATE 35: CPT | Mod: GC,,, | Performed by: INTERNAL MEDICINE

## 2022-10-10 PROCEDURE — 25000003 PHARM REV CODE 250: Performed by: INTERNAL MEDICINE

## 2022-10-10 PROCEDURE — 80053 COMPREHEN METABOLIC PANEL: CPT | Performed by: INTERNAL MEDICINE

## 2022-10-10 PROCEDURE — 99231 PR SUBSEQUENT HOSPITAL CARE,LEVL I: ICD-10-PCS | Mod: ,,, | Performed by: INTERNAL MEDICINE

## 2022-10-10 RX ORDER — GABAPENTIN 100 MG/1
100 CAPSULE ORAL 2 TIMES DAILY
Qty: 60 CAPSULE | Refills: 11 | Status: ON HOLD
Start: 2022-10-10 | End: 2024-03-01 | Stop reason: HOSPADM

## 2022-10-10 RX ORDER — LOSARTAN POTASSIUM 50 MG/1
50 TABLET ORAL DAILY
Status: DISCONTINUED | OUTPATIENT
Start: 2022-10-10 | End: 2022-10-11 | Stop reason: HOSPADM

## 2022-10-10 RX ORDER — METOPROLOL SUCCINATE 25 MG/1
25 TABLET, EXTENDED RELEASE ORAL DAILY
Status: DISCONTINUED | OUTPATIENT
Start: 2022-10-10 | End: 2022-10-11 | Stop reason: HOSPADM

## 2022-10-10 RX ORDER — LOSARTAN POTASSIUM 50 MG/1
25 TABLET ORAL DAILY
Start: 2022-10-10 | End: 2022-10-13

## 2022-10-10 RX ORDER — METHOCARBAMOL 500 MG/1
500 TABLET, FILM COATED ORAL 4 TIMES DAILY
Qty: 40 TABLET | Refills: 0 | Status: ON HOLD
Start: 2022-10-10 | End: 2022-10-14 | Stop reason: HOSPADM

## 2022-10-10 RX ORDER — TAMSULOSIN HYDROCHLORIDE 0.4 MG/1
0.4 CAPSULE ORAL NIGHTLY
Refills: 0 | Status: ON HOLD
Start: 2022-10-10 | End: 2023-03-01 | Stop reason: HOSPADM

## 2022-10-10 RX ORDER — METOPROLOL SUCCINATE 25 MG/1
25 TABLET, EXTENDED RELEASE ORAL DAILY
Status: ON HOLD
Start: 2022-10-10 | End: 2022-10-14 | Stop reason: HOSPADM

## 2022-10-10 RX ADMIN — LOSARTAN POTASSIUM 50 MG: 50 TABLET, FILM COATED ORAL at 10:10

## 2022-10-10 RX ADMIN — APIXABAN 5 MG: 5 TABLET, FILM COATED ORAL at 09:10

## 2022-10-10 RX ADMIN — METHOCARBAMOL 500 MG: 500 TABLET ORAL at 05:10

## 2022-10-10 RX ADMIN — OXYCODONE 5 MG: 5 TABLET ORAL at 05:10

## 2022-10-10 RX ADMIN — LACOSAMIDE 100 MG: 50 TABLET, FILM COATED ORAL at 10:10

## 2022-10-10 RX ADMIN — ATORVASTATIN CALCIUM 40 MG: 40 TABLET, FILM COATED ORAL at 10:10

## 2022-10-10 RX ADMIN — LOPERAMIDE HYDROCHLORIDE 2 MG: 2 CAPSULE ORAL at 10:10

## 2022-10-10 RX ADMIN — METOPROLOL SUCCINATE 12.5 MG: 25 TABLET, EXTENDED RELEASE ORAL at 09:10

## 2022-10-10 RX ADMIN — INSULIN DETEMIR 6 UNITS: 100 INJECTION, SOLUTION SUBCUTANEOUS at 10:10

## 2022-10-10 RX ADMIN — AMIODARONE HYDROCHLORIDE 200 MG: 200 TABLET ORAL at 10:10

## 2022-10-10 RX ADMIN — APIXABAN 5 MG: 5 TABLET, FILM COATED ORAL at 10:10

## 2022-10-10 RX ADMIN — AMOXICILLIN AND CLAVULANATE POTASSIUM 1 TABLET: 875; 125 TABLET, FILM COATED ORAL at 09:10

## 2022-10-10 RX ADMIN — TAMSULOSIN HYDROCHLORIDE 0.4 MG: 0.4 CAPSULE ORAL at 10:10

## 2022-10-10 RX ADMIN — METHOCARBAMOL 500 MG: 500 TABLET ORAL at 10:10

## 2022-10-10 RX ADMIN — GABAPENTIN 100 MG: 100 CAPSULE ORAL at 10:10

## 2022-10-10 RX ADMIN — INSULIN ASPART 8 UNITS: 100 INJECTION, SOLUTION INTRAVENOUS; SUBCUTANEOUS at 07:10

## 2022-10-10 RX ADMIN — CLOPIDOGREL 75 MG: 75 TABLET, FILM COATED ORAL at 10:10

## 2022-10-10 RX ADMIN — METOPROLOL SUCCINATE 25 MG: 25 TABLET, EXTENDED RELEASE ORAL at 10:10

## 2022-10-10 RX ADMIN — SERTRALINE HYDROCHLORIDE 50 MG: 50 TABLET ORAL at 10:10

## 2022-10-10 RX ADMIN — GABAPENTIN 100 MG: 100 CAPSULE ORAL at 09:10

## 2022-10-10 RX ADMIN — LACOSAMIDE 100 MG: 50 TABLET, FILM COATED ORAL at 09:10

## 2022-10-10 RX ADMIN — LIDOCAINE 1 PATCH: 50 PATCH CUTANEOUS at 09:10

## 2022-10-10 RX ADMIN — INSULIN ASPART 8 UNITS: 100 INJECTION, SOLUTION INTRAVENOUS; SUBCUTANEOUS at 11:10

## 2022-10-10 RX ADMIN — INSULIN DETEMIR 6 UNITS: 100 INJECTION, SOLUTION SUBCUTANEOUS at 09:10

## 2022-10-10 RX ADMIN — METHOCARBAMOL 500 MG: 500 TABLET ORAL at 09:10

## 2022-10-10 RX ADMIN — INSULIN ASPART 10 UNITS: 100 INJECTION, SOLUTION INTRAVENOUS; SUBCUTANEOUS at 04:10

## 2022-10-10 RX ADMIN — PANTOPRAZOLE SODIUM 40 MG: 40 TABLET, DELAYED RELEASE ORAL at 10:10

## 2022-10-10 NOTE — ASSESSMENT & PLAN NOTE
- presented with diabetic ketoacidosis and shock, initially requiring vasopressors   - suspect infection as trigger for DKA  - Unclear source as +BCx suspected to be contaminant  - DKA has now resolved

## 2022-10-10 NOTE — ASSESSMENT & PLAN NOTE
- Multiple admissions for DKA and has a history of type 2 diabetes mellitus  - Considering labile blood sugars, blood sugar trend in the last 24 hours was reasonable  - Continue Levemir 6 units twice daily   - Continue Aspart 8-12 units based on % of meal eaten (8 units if patient eats < or =25%, 10 units if he eats 50% and 12 units if patient eats >50%)  - Low dose correction scale prn AC and snacks with parameters to not administer aspart doses < 4 hours apart  - Hypoglycemia protocol in place  - If blood glucose greater than 300, please ask patient not to eat food or drink anything other than water until correctional insulin has brought it back below 250  - Please ensure patient receives their p.r.n. insulin aspart if they eat a snack with more than 30 g of carbohydrate

## 2022-10-10 NOTE — PT/OT/SLP PROGRESS
Occupational Therapy      Patient Name:  Kamar Muñoz   MRN:  [id number]    Patient not seen today secondary to pt with active dc orders in place,  OT to follow up 10/11 if remains admitted to [facility].     OT recc: [facility]    10/10/2022

## 2022-10-10 NOTE — PLAN OF CARE
ROSSI contacted micro dept to follow up on Covid test, chart review showed  test still processing. Tech verified they have specimen but no processing has been done. She will assign someone to process asap, will take 90 mins to complete (approximately 7:40 PM).    Week day ROSSI said the results needed to be emailed to Renée at [facility] for room assignment and approval to setup transportation. This CM updated attending team.    CM/SW staff will follow up in the morning.        Lynn Hahn RN  On-Call  - [facility]  [phone]

## 2022-10-10 NOTE — PROGRESS NOTES
[facility] - Med Surg  Endocrinology  Progress Note    Admit Date: 10/5/2022     Reason for Consult: Management of T2DM, Hyperglycemia, DKA on admission - resolved at time of endocrine consult    Surgical Procedure and Date: n/a    Diabetes diagnosis year: >20yrs ago    Home Diabetes Medications:  In a nursing home now. Last admission 5/2022 final endocrine recs from inpatient team for insulin regimen at [facility] were as follows:  - Levemir 6 units twice daily and on insulin aspart 8-16 units (please give 8 units if eats <25%, 12 units with 50 % intake and full dose if eats 100%),  in addition to moderate correction with with meals  -  Aspart 4 units PRN in place for nightly and in between meal snacks  *Fixed doses of insulin we have recommended are to cover meals containing 60 g of carbohydrate and it is very important that meals are consistently containing 60 g of carbs.  If patient exceed 60 g of carbohydrate per meal or consumes any carbohydrates between meals without insulin coverage this will likely again lead to significant hyperglycemia. In addition if he eats < 50 % of meal to only give half the recommended pre-meal dose.      How often checking glucose at home? >4 x day   BG readings on regimen: >200  Hypoglycemia on the regimen?  Yes  Missed doses on regimen?  No    Diabetes Complications include:     Hyperglycemia, Hypoglycemia , and Diabetic chronic kidney disease         Complicating diabetes co morbidities:   History of MI, History of CVA, and CKD    HPI:   Mr. Muñoz is a 76yo M w/ hx of stroke, ketosis prone T2DM (poorly controlled with recurrent DKA), CAD, HLD, paroxysmal Afib, and seizures who presented to the ED via EMS from [facility] with concerns of nausea/vomiting, hypotension, hyperglycemia. Per daughter, he has been depressed and has had very poor oral intake since his wife passed away 4 weeks ago.  He has not been taking care of himself. He was seen by his PCP 3 days prior to  admission, who adjusted his anitdepressants dose. He also had a fall last Friday while he was getting dressed that was described as mild. It was not associated with head trauma, as most of fall was on his elbow.      ED course notable for hyperglycemia, keotacidosis, hypotension requiring vasopressors. He was admitted  to the MICU initially in septic shock, significant lactic acidosis, and DKA. Infectious workup revealed +Bcx. For DKA he was started on the intensive insulin protocol for DKA. He was transitioned to MDI on 10/6 and stepped down to hospital medicine. Endocrine was consulted for BG management.         Interval HPI:   No major events, considering labile blood sugars, good control seen in the last 24 hours    BP (!) 168/88   Pulse 64   Temp 97.6 °F (36.4 °C)   Resp 19   Wt 86.2 kg (190 lb)   SpO2 (!) 92%   BMI 24.39 kg/m²     Labs Reviewed and Include    Recent Labs   Lab 10/10/22  0308   *   CALCIUM 8.4*   ALBUMIN 2.4*   PROT 5.8*   *   K 3.7   CO2 23      BUN 13   CREATININE 0.8   ALKPHOS 121   ALT 17   AST 21   BILITOT 0.4     Lab Results   Component Value Date    WBC 6.42 10/10/2022    HGB 12.1 (L) 10/10/2022    HCT 37.6 (L) 10/10/2022    MCV 92 10/10/2022     (L) 10/10/2022     No results for input(s): TSH, FREET4 in the last 168 hours.  Lab Results   Component Value Date    HGBA1C 10.6 (H) 10/05/2022       Nutritional status:   Body mass index is 24.39 kg/m².  Lab Results   Component Value Date    ALBUMIN 2.4 (L) 10/10/2022    ALBUMIN 2.4 (L) 10/09/2022    ALBUMIN 2.3 (L) 10/08/2022     No results found for: PREALBUMIN    Estimated Creatinine Clearance: 87.1 mL/min (based on SCr of 0.8 mg/dL).    Accu-Checks  Recent Labs     10/07/22  2034 10/08/22  0719 10/08/22  1104 10/08/22  1615 10/08/22  1925 10/09/22  0703 10/09/22  1116 10/09/22  1611 10/09/22  1959 10/10/22  0809   POCTGLUCOSE 223* 229* 166* 178* 137* 139* 192* 230* 114* 206*       Current Medications and/or  Treatments Impacting Glycemic Control  Immunotherapy:    Immunosuppressants       None          Steroids:   Hormones (From admission, onward)      None          Pressors:    Autonomic Drugs (From admission, onward)      None          Hyperglycemia/Diabetes Medications:   Antihyperglycemics (From admission, onward)      Start     Stop Route Frequency Ordered    10/08/22 0900  insulin detemir U-100 pen 6 Units         -- SubQ 2 times daily 10/07/22 1046    10/07/22 1730  insulin aspart U-100 pen 8-12 Units         -- SubQ 3 times daily with meals 10/07/22 1715    10/07/22 1145  insulin aspart U-100 pen 0-4 Units         -- SubQ As needed (PRN) 10/07/22 1046            ASSESSMENT and PLAN    Ketosis-prone diabetes mellitus  - Multiple admissions for DKA and has a history of type 2 diabetes mellitus  - Considering labile blood sugars, blood sugar trend in the last 24 hours was reasonable  - Continue Levemir 6 units twice daily   - Continue Aspart 8-12 units based on % of meal eaten (8 units if patient eats < or =25%, 10 units if he eats 50% and 12 units if patient eats >50%)  - Low dose correction scale prn AC and snacks with parameters to not administer aspart doses < 4 hours apart  - Hypoglycemia protocol in place  - If blood glucose greater than 300, please ask patient not to eat food or drink anything other than water until correctional insulin has brought it back below 250  - Please ensure patient receives their p.r.n. insulin aspart if they eat a snack with more than 30 g of carbohydrate    Diabetic ketoacidosis  - presented with diabetic ketoacidosis and shock, initially requiring vasopressors   - suspect infection as trigger for DKA  - Unclear source as +BCx suspected to be contaminant  - DKA has now resolved         Modesto Abraham MD  Endocrinology  [facility]

## 2022-10-10 NOTE — PLAN OF CARE
NURSING HOME ORDERS    10/11/2022  [facility]  [address] [facility] [address]  Dept: [phone]  Loc: [phone]     Admit to Nursing Home:  Skilled Nursing Facility    Diagnoses:  Active Hospital Problems    Diagnosis  POA    Pneumocephalus [G93.89]  Yes    Blood in stool [K92.1]  Yes    Positive blood cultures [R78.81]  Yes    Lactic acidosis [E87.20]  Yes    Goals of care, counseling/discussion [Z71.89]  Not Applicable    Chronic anticoagulation [Z79.01]  Not Applicable    Diabetic ketoacidosis [E11.10]  Yes    Adjustment disorder with disturbance of conduct [F43.24]  Yes    Debility [R53.81]  Yes    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]  Yes    History of partial seizures [Z86.69]  Not Applicable    Encephalopathy, metabolic [G93.41]  Yes    History of ST elevation myocardial infarction (STEMI) [I25.2]  Not Applicable     Chronic    Paroxysmal atrial fibrillation [I48.0]  Yes     Chronic    Coronary artery disease involving native coronary artery of native heart with unstable angina pectoris [I25.110]  Yes     Chronic    Type 2 diabetes mellitus with hyperglycemia, with long-term current use of insulin [E11.65, Z79.4]  Not Applicable     Chronic    Essential hypertension [I10]  Yes     Chronic      Resolved Hospital Problems    Diagnosis Date Resolved POA    *Septic shock [A41.9, R65.21] 10/09/2022 Yes    BRENDAN (acute kidney injury) [N17.9] 10/09/2022 Yes       Patient is homebound due to:  Septic shock    Allergies:  Review of patient's allergies indicates:   Allergen Reactions    Iodine      Other reaction(s): swelling  Other reaction(s): Itching  Other reaction(s): Rash       Vitals:  Per facility protocol     Diet: No concentrated sugars    Activities:   Activity as tolerated    Goals of Care Treatment Preferences:  Code Status: DNR      Labs:  Per facility protocol     Nursing Precautions:  Fall and Pressure ulcer prevention    Consults:   PT to  evaluate and treat- 5 times a week, OT to evaluate and treat- 5 times a week, ST to evaluate and treat- 5 times a week, and Nutrition to evaluate and recommend diet   - PT/OT/ST evaluate and treat    Miscellaneous Care: Routine Skin for Bedridden Patients:  Apply moisture barrier cream to all  Diabetes Care: Diabetes: Check blood sugar. Fingerstick blood sugar AC and HS  Sliding Scale/Hypoglycemia Protocol: For FSG<80, give 1 amp D50 or 1 glucose tablet. For FSG , do nothing. For -200, give 1 unit of novolog in addition to pre-meal insulin. For -250, give 2 units of novolog in addition to pre-meal insulin. For -300, give 3 units of novolog in addition to pre-meal insulin. For 301-350, give 4 units of novolog in addition to pre-meal insulin. For 351-400, give 5 units of novolog in addition to pre-meal insulin. For FSG >400, give 5 units of novolog in addition to pre-meal insulin and please call MD                   Diabetes Care:  SN to perform and educate Diabetic management with blood glucose monitoring:, Fingerstick blood sugar AC and HS, and Report CBG < 60 or > 350 to physician.      Medications: Discontinue all previous medication orders, if any. See new list below.     Medication List        START taking these medications      amiodarone 200 MG Tab  Commonly known as: PACERONE  Take 1 tablet (200 mg total) by mouth once daily.     ergocalciferol 50,000 unit Cap  Commonly known as: ERGOCALCIFEROL  Take 1 capsule (50,000 Units total) by mouth every 7 days.     gabapentin 100 MG capsule  Commonly known as: NEURONTIN  Take 1 capsule (100 mg total) by mouth 2 (two) times daily.     insulin detemir U-100 100 unit/mL (3 mL) Inpn pen  Commonly known as: Levemir FLEXTOUCH  Inject 6 Units into the skin 2 (two) times daily.     methocarbamoL 500 MG Tab  Commonly known as: ROBAXIN  Take 1 tablet (500 mg total) by mouth 4 (four) times daily. for 10 days            CHANGE how you take these  medications      losartan 50 MG tablet  Commonly known as: COZAAR  Take 0.5 tablets (25 mg total) by mouth once daily.  What changed: medication strength     metoprolol succinate 25 MG 24 hr tablet  Commonly known as: TOPROL-XL  Take 1 tablet (25 mg total) by mouth once daily.  What changed:   medication strength  how much to take            CONTINUE taking these medications      atorvastatin 40 MG tablet  Commonly known as: LIPITOR  Take 1 tablet (40 mg total) by mouth once daily.     blood sugar diagnostic Strp  1 strip by Misc.(Non-Drug; Combo Route) route 2 (two) times a day.     cetirizine 10 MG tablet  Commonly known as: ZYRTEC  Take 1 tablet (10 mg total) by mouth every evening.     clopidogreL 75 mg tablet  Commonly known as: PLAVIX  Take 75 mg by mouth once daily.     diclofenac sodium 1 % Gel  Commonly known as: VOLTAREN  Apply 4 g topically 3 (three) times daily. Apply to sacrun closed skin/buttocks     docusate sodium 100 MG capsule  Commonly known as: COLACE  Take 100 mg by mouth once daily at 6am.     ELIQUIS 5 mg Tab  Generic drug: apixaban  Take 1 tablet (5 mg total) by mouth 2 (two) times daily.     * glucose 4 GM chewable tablet  Take 4 tablets (16 g total) by mouth as needed for Low blood sugar (50 - 70).     * glucose 4 GM chewable tablet  Take 6 tablets (24 g total) by mouth as needed for Low blood sugar (< 50).     * insulin aspart U-100 100 unit/mL (3 mL) Inpn pen  Commonly known as: NovoLOG  Inject 4 Units into the skin with snacks (>200).     * insulin aspart U-100 100 unit/mL (3 mL) Inpn pen  Commonly known as: NovoLOG  Inject 8-16 Units into the skin 3 (three) times daily.     lacosamide 100 mg Tab  Commonly known as: VIMPAT  Take 1 tablet (100 mg total) by mouth every 12 (twelve) hours.     lancets Misc  Commonly known as: ONETOUCH ULTRASOFT LANCETS  1 lancet by Misc.(Non-Drug; Combo Route) route 2 (two) times a day.     magnesium oxide 400 mg (241.3 mg magnesium) tablet  Commonly known  "as: MAG-OX  Take 1 tablet (400 mg total) by mouth 2 (two) times daily.     METAMUCIL (WITH SUGAR) 3.4 gram packet  Generic drug: psyllium husk (with sugar)  Take 1 packet by mouth 2 (two) times daily.     MULTIVITAMIN ORAL  Take 1 tablet by mouth once daily.     nitroGLYCERIN 0.4 MG SL tablet  Commonly known as: NITROSTAT  Place 1 tablet (0.4 mg total) under the tongue every 5 (five) minutes as needed for Chest pain.     ondansetron 4 MG Tbdl  Commonly known as: ZOFRAN-ODT  Take 4 mg by mouth 3 (three) times daily with meals.     pantoprazole 40 MG tablet  Commonly known as: PROTONIX  Take 1 tablet (40 mg total) by mouth once daily.     * pen needle, diabetic 30 gauge x 5/16" Ndle  Commonly known as: PEN NEEDLE  1 Units by Misc.(Non-Drug; Combo Route) route 3 (three) times daily.     * BD ULTRA-FINE SHORT PEN NEEDLE 31 gauge x 5/16" Ndle  Generic drug: pen needle, diabetic  3 (three) times daily.     polycarbophil 625 mg tablet  Commonly known as: FIBERCON  Take 1 tablet by mouth once daily.     sertraline 50 MG tablet  Commonly known as: ZOLOFT  Take 50 mg by mouth once daily.     sucralfate 1 gram tablet  Commonly known as: CARAFATE  Take 1 g by mouth 3 (three) times daily before meals.     tamsulosin 0.4 mg Cap  Commonly known as: FLOMAX  Take 1 capsule (0.4 mg total) by mouth every evening.           * This list has 6 medication(s) that are the same as other medications prescribed for you. Read the directions carefully, and ask your doctor or other care provider to review them with you.                STOP taking these medications      LANTUS SOLOSTAR U-100 INSULIN SUBQ                Immunizations Administered as of 10/11/2022       Name Date Dose VIS Date Route Exp Date    COVID-19, MRNA, LN-S, PF (Pfizer) (Purple Cap) 10/29/2021 0.3 mL -- Intramuscular --    Site: Left arm     : Pf[employer] Inc     Lot: [id number]     COVID-19, MRNA, LN-S, PF (Pfizer) (Purple Cap) 1/29/2021 0.3 mL -- Intramuscular --    " Site: Left deltoid     : Pfizer Inc     Lot: [id number]             This patient has had both covid vaccinations    Some patients may experience side effects after vaccination.  These may include fever, headache, muscle or joint aches.  Most symptoms resolve with 24-48 hours and do not require urgent medical evaluation unless they persist for more than 72 hours or symptoms are concerning for an unrelated medical condition.            _________________________________  Israel Escamilla MD  10/11/2022

## 2022-10-10 NOTE — SUBJECTIVE & OBJECTIVE
Interval HPI:   No major events, considering labile blood sugars, good control seen in the last 24 hours    BP (!) 168/88   Pulse 64   Temp 97.6 °F (36.4 °C)   Resp 19   Wt 86.2 kg (190 lb)   SpO2 (!) 92%   BMI 24.39 kg/m²     Labs Reviewed and Include    Recent Labs   Lab 10/10/22  0308   *   CALCIUM 8.4*   ALBUMIN 2.4*   PROT 5.8*   *   K 3.7   CO2 23      BUN 13   CREATININE 0.8   ALKPHOS 121   ALT 17   AST 21   BILITOT 0.4     Lab Results   Component Value Date    WBC 6.42 10/10/2022    HGB 12.1 (L) 10/10/2022    HCT 37.6 (L) 10/10/2022    MCV 92 10/10/2022     (L) 10/10/2022     No results for input(s): TSH, FREET4 in the last 168 hours.  Lab Results   Component Value Date    HGBA1C 10.6 (H) 10/05/2022       Nutritional status:   Body mass index is 24.39 kg/m².  Lab Results   Component Value Date    ALBUMIN 2.4 (L) 10/10/2022    ALBUMIN 2.4 (L) 10/09/2022    ALBUMIN 2.3 (L) 10/08/2022     No results found for: PREALBUMIN    Estimated Creatinine Clearance: 87.1 mL/min (based on SCr of 0.8 mg/dL).    Accu-Checks  Recent Labs     10/07/22  2034 10/08/22  0719 10/08/22  1104 10/08/22  1615 10/08/22  1925 10/09/22  0703 10/09/22  1116 10/09/22  1611 10/09/22  1959 10/10/22  0809   POCTGLUCOSE 223* 229* 166* 178* 137* 139* 192* 230* 114* 206*       Current Medications and/or Treatments Impacting Glycemic Control  Immunotherapy:    Immunosuppressants       None          Steroids:   Hormones (From admission, onward)      None          Pressors:    Autonomic Drugs (From admission, onward)      None          Hyperglycemia/Diabetes Medications:   Antihyperglycemics (From admission, onward)      Start     Stop Route Frequency Ordered    10/08/22 0900  insulin detemir U-100 pen 6 Units         -- SubQ 2 times daily 10/07/22 1046    10/07/22 1730  insulin aspart U-100 pen 8-12 Units         -- SubQ 3 times daily with meals 10/07/22 1715    10/07/22 1145  insulin aspart U-100 pen 0-4 Units          -- SubQ As needed (PRN) 10/07/22 1040

## 2022-10-11 VITALS
DIASTOLIC BLOOD PRESSURE: 70 MMHG | RESPIRATION RATE: 18 BRPM | SYSTOLIC BLOOD PRESSURE: 154 MMHG | OXYGEN SATURATION: 92 % | BODY MASS INDEX: 24.39 KG/M2 | WEIGHT: 190 LBS | TEMPERATURE: 98 F | HEART RATE: 70 BPM

## 2022-10-11 LAB
ALBUMIN SERPL BCP-MCNC: 2.4 G/DL (ref 3.5–5.2)
ALP SERPL-CCNC: 134 U/L (ref 55–135)
ALT SERPL W/O P-5'-P-CCNC: 17 U/L (ref 10–44)
ANION GAP SERPL CALC-SCNC: 8 MMOL/L (ref 8–16)
AST SERPL-CCNC: 24 U/L (ref 10–40)
B-OH-BUTYR BLD STRIP-SCNC: 0.1 MMOL/L (ref 0–0.5)
BASOPHILS # BLD AUTO: 0.03 K/UL (ref 0–0.2)
BASOPHILS NFR BLD: 0.4 % (ref 0–1.9)
BILIRUB SERPL-MCNC: 0.4 MG/DL (ref 0.1–1)
BUN SERPL-MCNC: 11 MG/DL (ref 8–23)
CALCIUM SERPL-MCNC: 8.4 MG/DL (ref 8.7–10.5)
CHLORIDE SERPL-SCNC: 103 MMOL/L (ref 95–110)
CO2 SERPL-SCNC: 26 MMOL/L (ref 23–29)
CREAT SERPL-MCNC: 0.8 MG/DL (ref 0.5–1.4)
DIFFERENTIAL METHOD: ABNORMAL
EOSINOPHIL # BLD AUTO: 0.5 K/UL (ref 0–0.5)
EOSINOPHIL NFR BLD: 6.3 % (ref 0–8)
ERYTHROCYTE [DISTWIDTH] IN BLOOD BY AUTOMATED COUNT: 11.3 % (ref 11.5–14.5)
EST. GFR  (NO RACE VARIABLE): >60 ML/MIN/1.73 M^2
GLUCOSE SERPL-MCNC: 109 MG/DL (ref 70–110)
HCT VFR BLD AUTO: 37.2 % (ref 40–54)
HGB BLD-MCNC: 12.4 G/DL (ref 14–18)
IMM GRANULOCYTES # BLD AUTO: 0.02 K/UL (ref 0–0.04)
IMM GRANULOCYTES NFR BLD AUTO: 0.3 % (ref 0–0.5)
LYMPHOCYTES # BLD AUTO: 1.3 K/UL (ref 1–4.8)
LYMPHOCYTES NFR BLD: 17.1 % (ref 18–48)
MAGNESIUM SERPL-MCNC: 1.5 MG/DL (ref 1.6–2.6)
MCH RBC QN AUTO: 30 PG (ref 27–31)
MCHC RBC AUTO-ENTMCNC: 33.3 G/DL (ref 32–36)
MCV RBC AUTO: 90 FL (ref 82–98)
MONOCYTES # BLD AUTO: 0.8 K/UL (ref 0.3–1)
MONOCYTES NFR BLD: 10.3 % (ref 4–15)
NEUTROPHILS # BLD AUTO: 5.1 K/UL (ref 1.8–7.7)
NEUTROPHILS NFR BLD: 65.6 % (ref 38–73)
NRBC BLD-RTO: 0 /100 WBC
PLATELET # BLD AUTO: 156 K/UL (ref 150–450)
PMV BLD AUTO: 9.3 FL (ref 9.2–12.9)
POCT GLUCOSE: 109 MG/DL (ref 70–110)
POCT GLUCOSE: 272 MG/DL (ref 70–110)
POTASSIUM SERPL-SCNC: 3.7 MMOL/L (ref 3.5–5.1)
PROT SERPL-MCNC: 6 G/DL (ref 6–8.4)
RBC # BLD AUTO: 4.14 M/UL (ref 4.6–6.2)
SARS-COV-2 RNA RESP QL NAA+PROBE: NOT DETECTED
SODIUM SERPL-SCNC: 137 MMOL/L (ref 136–145)
WBC # BLD AUTO: 7.76 K/UL (ref 3.9–12.7)

## 2022-10-11 PROCEDURE — 99239 PR HOSPITAL DISCHARGE DAY,>30 MIN: ICD-10-PCS | Mod: ,,, | Performed by: INTERNAL MEDICINE

## 2022-10-11 PROCEDURE — 85025 COMPLETE CBC W/AUTO DIFF WBC: CPT | Performed by: STUDENT IN AN ORGANIZED HEALTH CARE EDUCATION/TRAINING PROGRAM

## 2022-10-11 PROCEDURE — 80053 COMPREHEN METABOLIC PANEL: CPT | Performed by: INTERNAL MEDICINE

## 2022-10-11 PROCEDURE — 25000003 PHARM REV CODE 250: Performed by: INTERNAL MEDICINE

## 2022-10-11 PROCEDURE — 82010 KETONE BODYS QUAN: CPT | Performed by: STUDENT IN AN ORGANIZED HEALTH CARE EDUCATION/TRAINING PROGRAM

## 2022-10-11 PROCEDURE — 94761 N-INVAS EAR/PLS OXIMETRY MLT: CPT

## 2022-10-11 PROCEDURE — 25000003 PHARM REV CODE 250: Performed by: STUDENT IN AN ORGANIZED HEALTH CARE EDUCATION/TRAINING PROGRAM

## 2022-10-11 PROCEDURE — 83735 ASSAY OF MAGNESIUM: CPT | Performed by: STUDENT IN AN ORGANIZED HEALTH CARE EDUCATION/TRAINING PROGRAM

## 2022-10-11 PROCEDURE — 97530 THERAPEUTIC ACTIVITIES: CPT | Mod: CQ

## 2022-10-11 PROCEDURE — U0005 INFEC AGEN DETEC AMPLI PROBE: HCPCS | Performed by: INTERNAL MEDICINE

## 2022-10-11 PROCEDURE — 63600175 PHARM REV CODE 636 W HCPCS: Performed by: STUDENT IN AN ORGANIZED HEALTH CARE EDUCATION/TRAINING PROGRAM

## 2022-10-11 PROCEDURE — U0003 INFECTIOUS AGENT DETECTION BY NUCLEIC ACID (DNA OR RNA); SEVERE ACUTE RESPIRATORY SYNDROME CORONAVIRUS 2 (SARS-COV-2) (CORONAVIRUS DISEASE [COVID-19]), AMPLIFIED PROBE TECHNIQUE, MAKING USE OF HIGH THROUGHPUT TECHNOLOGIES AS DESCRIBED BY CMS-2020-01-R: HCPCS | Performed by: INTERNAL MEDICINE

## 2022-10-11 PROCEDURE — 99239 HOSP IP/OBS DSCHRG MGMT >30: CPT | Mod: ,,, | Performed by: INTERNAL MEDICINE

## 2022-10-11 RX ADMIN — METOPROLOL SUCCINATE 25 MG: 25 TABLET, EXTENDED RELEASE ORAL at 10:10

## 2022-10-11 RX ADMIN — SERTRALINE HYDROCHLORIDE 50 MG: 50 TABLET ORAL at 10:10

## 2022-10-11 RX ADMIN — METHOCARBAMOL 500 MG: 500 TABLET ORAL at 10:10

## 2022-10-11 RX ADMIN — ATORVASTATIN CALCIUM 40 MG: 40 TABLET, FILM COATED ORAL at 10:10

## 2022-10-11 RX ADMIN — TAMSULOSIN HYDROCHLORIDE 0.4 MG: 0.4 CAPSULE ORAL at 10:10

## 2022-10-11 RX ADMIN — INSULIN DETEMIR 6 UNITS: 100 INJECTION, SOLUTION SUBCUTANEOUS at 09:10

## 2022-10-11 RX ADMIN — INSULIN ASPART 10 UNITS: 100 INJECTION, SOLUTION INTRAVENOUS; SUBCUTANEOUS at 11:10

## 2022-10-11 RX ADMIN — CLOPIDOGREL 75 MG: 75 TABLET, FILM COATED ORAL at 10:10

## 2022-10-11 RX ADMIN — INSULIN ASPART 8 UNITS: 100 INJECTION, SOLUTION INTRAVENOUS; SUBCUTANEOUS at 07:10

## 2022-10-11 RX ADMIN — PANTOPRAZOLE SODIUM 40 MG: 40 TABLET, DELAYED RELEASE ORAL at 10:10

## 2022-10-11 RX ADMIN — APIXABAN 5 MG: 5 TABLET, FILM COATED ORAL at 10:10

## 2022-10-11 RX ADMIN — AMIODARONE HYDROCHLORIDE 200 MG: 200 TABLET ORAL at 10:10

## 2022-10-11 RX ADMIN — AMOXICILLIN AND CLAVULANATE POTASSIUM 1 TABLET: 875; 125 TABLET, FILM COATED ORAL at 10:10

## 2022-10-11 RX ADMIN — LACOSAMIDE 100 MG: 50 TABLET, FILM COATED ORAL at 10:10

## 2022-10-11 RX ADMIN — GABAPENTIN 100 MG: 100 CAPSULE ORAL at 10:10

## 2022-10-11 RX ADMIN — LOSARTAN POTASSIUM 50 MG: 50 TABLET, FILM COATED ORAL at 10:10

## 2022-10-11 NOTE — ASSESSMENT & PLAN NOTE
Patient's FSGs are controlled on current medication regimen.  Last A1c reviewed-   Lab Results   Component Value Date    HGBA1C 10.6 (H) 10/05/2022     Most recent fingerstick glucose reviewed-   Recent Labs   Lab 10/10/22  0809 10/10/22  1141 10/10/22  1609 10/10/22  2000   POCTGLUCOSE 206* 347* 262* 171*     Current correctional scale  Low  Maintain anti-hyperglycemic dose as follows-   Antihyperglycemics (From admission, onward)    Start     Stop Route Frequency Ordered    10/08/22 0900  insulin detemir U-100 pen 6 Units         -- SubQ 2 times daily 10/07/22 1046    10/07/22 1730  insulin aspart U-100 pen 8-12 Units         -- SubQ 3 times daily with meals 10/07/22 1715    10/07/22 1145  insulin aspart U-100 pen 0-4 Units         -- SubQ As needed (PRN) 10/07/22 1046        Hold Oral hypoglycemics while patient is in the h  ospital.  - endocrinology consulted, apprec recs   -- P/w DKA and shock, initially requiring vasopressors   -- Suspect infection as trigger for DKA? Unclear source as +BCx suspected to be contaminant  -- completed insulin gtt and DKA now resolved   -- continue Levemir 6 units twice daily   - continue Aspart 8-12 unitt based on % of meal eaten -- 8 units if patient eats < or =25%, 10 units if pt eats 50% and 12 units if patient eats >50%.  -- Low dose correction scale prn AC and snacks with parameters to not administer aspart doses <4hrs apart  - RESOLVED

## 2022-10-11 NOTE — PT/OT/SLP PROGRESS
"Physical Therapy Treatment    Patient Name:  Kamar Muñoz   MRN:  [id number]    Recommendations:     Discharge Recommendations:  nursing facility, skilled   Discharge Equipment Recommendations: bedside commode   Barriers to discharge:  impaired functional mobility requiring increased assistance    Assessment:     Kamar Muñoz is a 77 y.o. male admitted with a medical diagnosis of Septic shock.  He presents with the following impairments/functional limitations:  weakness, impaired endurance, impaired self care skills, impaired functional mobility, gait instability, impaired balance, impaired cognition, decreased coordination, decreased upper extremity function, decreased lower extremity function, decreased safety awareness, pain, impaired coordination.    Pt tolerates session well with focus on bed mobility, transfers, and gait training. Pt progressing well with improvement in all mobility from prior session. Pt answers all orientation questions appropriately, does mention some confusion regarding his exact medical situation, and pt states he is aware that he was confused in recent days. Pt able to ambulate in room with RW and Min A. Mild instability remains d/t B knee flexion in all phases of gait. Pt limited primarily by unrated, generalized back pain this day. Pt will continue to benefit from therapy services to address impairments listed above.       Rehab Prognosis: Good; patient would benefit from acute skilled PT services to address these deficits and reach maximum level of function.    Recent Surgery: * No surgery found *      Plan:     During this hospitalization, patient to be seen 3 x/week to address the identified rehab impairments via gait training, therapeutic activities, therapeutic exercises, neuromuscular re-education and progress toward the following goals:    Plan of Care Expires:  11/04/22    Subjective     Chief Complaint: back pain  Patient/Family Comments/goals: "It hurts to sit " "up."  Pain/Comfort:  Pain Rating 1: other (see comments) (unrated c/o pain)  Location - Orientation 1: generalized  Location 1: back  Pain Addressed 1: Reposition, Distraction  Pain Rating Post-Intervention 1: other (see comments) (unrated c/o pain)      Objective:     Communicated with RN prior to session.  Patient found up in chair with  (no active lines attached) upon PTA entry to room.     General Precautions: Standard, fall   Orthopedic Precautions:N/A   Braces: N/A  Respiratory Status: Room air     Functional Mobility:  Bed Mobility:     Rolling Left:  stand by assistance  Rolling Right: stand by assistance  Supine to Sit: stand by assistance  Sit to Supine: stand by assistance  Transfers:     Sit to Stand:  minimum assistance with rolling walker  Gait: Pt ambulates ~18 ft in room with RW and Min A. Pt with B knee flexion in all phases. Unstable balance requiring some steadying assistance.       AM-PAC 6 CLICK MOBILITY  Turning over in bed (including adjusting bedclothes, sheets and blankets)?: 3  Sitting down on and standing up from a chair with arms (e.g., wheelchair, bedside commode, etc.): 3  Moving from lying on back to sitting on the side of the bed?: 3  Moving to and from a bed to a chair (including a wheelchair)?: 3  Need to walk in hospital room?: 3  Climbing 3-5 steps with a railing?: 1  Basic Mobility Total Score: 16       Therapeutic Activities and Exercises:   Pt assisted with functional mobility as noted above.   Pt assisted with pericare in supine prior to sitting up to EOB. Multiple rolls requiring increased time and cues on hand placement to bed rails for decreased caregiver assist.     Patient left HOB elevated with all lines intact and call button in reach..    GOALS:   Multidisciplinary Problems       Physical Therapy Goals          Problem: Physical Therapy    Goal Priority Disciplines Outcome Goal Variances Interventions   Physical Therapy Goal     PT, PT/OT Ongoing, Progressing   "   Description: Goals to be met by: 22     Patient will increase functional independence with mobility by performin. Supine to sit with MInimal Assistance  2. Sit to supine with Set-up Reynolds  3. Rolling to Left and Right with Modified Reynolds.  4. Sit to stand transfer with Maximum Assistance  5. Bed to chair transfer with Maximum Assistance using Rolling Walker  6. Lower extremity exercise program x 10 reps per handout, with assistance as needed                         Time Tracking:     PT Received On: 10/11/22  PT Start Time: 818     PT Stop Time: 836  PT Total Time (min): 18 min     Billable Minutes: Therapeutic Activity 18    Treatment Type: Treatment  PT/PTA: PTA     PTA Visit Number: 1     10/11/2022

## 2022-10-11 NOTE — SUBJECTIVE & OBJECTIVE
Interval History: Patient lying in bed, no acute distress. Alert and oriented x 3. Reports back pain fairly well controlled. BG controlled with insulin adjustments by endocrinology. Boost TID ordered. Awaiting SNF placement at prior facility.      Review of Systems   Constitutional:  Positive for activity change and fatigue. Negative for chills and fever.   HENT:  Negative for congestion and trouble swallowing.    Eyes:  Negative for visual disturbance.   Respiratory:  Negative for cough and shortness of breath.    Cardiovascular:  Negative for chest pain and leg swelling.   Gastrointestinal:  Negative for abdominal distention, abdominal pain, nausea and vomiting.   Endocrine: Negative for cold intolerance, heat intolerance, polydipsia and polyuria.   Genitourinary:  Negative for difficulty urinating and dysuria.   Musculoskeletal:  Positive for back pain. Negative for myalgias.   Skin:  Negative for rash and wound.   Neurological:  Positive for weakness. Negative for dizziness and light-headedness.   Hematological:  Negative for adenopathy. Does not bruise/bleed easily.   Psychiatric/Behavioral:  Negative for confusion and sleep disturbance.      Objective:     Vital Signs (Most Recent):  Temp: 97 °F (36.1 °C) (10/10/22 2001)  Pulse: 71 (10/10/22 2001)  Resp: 18 (10/10/22 2001)  BP: (!) 100/59 (10/10/22 2001)  SpO2: (!) 94 % (10/10/22 2001)   Vital Signs (24h Range):  Temp:  [97 °F (36.1 °C)-97.6 °F (36.4 °C)] 97 °F (36.1 °C)  Pulse:  [64-71] 71  Resp:  [16-19] 18  SpO2:  [92 %-94 %] 94 %  BP: (100-181)/(59-88) 100/59     Weight: 86.2 kg (190 lb)  Body mass index is 24.39 kg/m².    Intake/Output Summary (Last 24 hours) at 10/10/2022 3973  Last data filed at 10/10/2022 0558  Gross per 24 hour   Intake --   Output 300 ml   Net -300 ml        Physical Exam  Constitutional:       Appearance: He is well-developed.   HENT:      Head: Normocephalic and atraumatic.   Eyes:      General: No scleral icterus.     Pupils:  Pupils are equal, round, and reactive to light.   Neck:      Vascular: No JVD.   Cardiovascular:      Rate and Rhythm: Normal rate and regular rhythm.      Heart sounds: No murmur heard.    No friction rub. No gallop.   Pulmonary:      Effort: Pulmonary effort is normal. No respiratory distress.      Breath sounds: Normal breath sounds. No wheezing or rales.   Abdominal:      General: Bowel sounds are normal. There is no distension.      Palpations: Abdomen is soft.      Tenderness: There is no abdominal tenderness. There is no guarding or rebound.   Musculoskeletal:         General: No deformity. Normal range of motion.      Cervical back: Neck supple.   Lymphadenopathy:      Cervical: No cervical adenopathy.   Skin:     General: Skin is warm and dry.      Capillary Refill: Capillary refill takes less than 2 seconds.      Findings: No erythema or rash.   Neurological:      Mental Status: He is alert and oriented to person, place, and time.      Cranial Nerves: No cranial nerve deficit.      Sensory: No sensory deficit.   Psychiatric:         Judgment: Judgment normal.       Significant Labs: A1C:   Recent Labs   Lab 10/05/22  1724   HGBA1C 10.6*       CBC:   Recent Labs   Lab 10/09/22  0745 10/10/22  0308   WBC 6.16 6.42   HGB 12.9* 12.1*   HCT 40.7 37.6*   * 142*       CMP:   Recent Labs   Lab 10/09/22  0745 10/10/22  0308   * 134*   K 4.1 3.7    103   CO2 22* 23   * 131*   BUN 12 13   CREATININE 0.8 0.8   CALCIUM 8.5* 8.4*   PROT 6.0 5.8*   ALBUMIN 2.4* 2.4*   BILITOT 0.5 0.4   ALKPHOS 124 121   AST 26 21   ALT 21 17   ANIONGAP 8 8         Significant Imaging: I have reviewed all pertinent imaging results/findings within the past 24 hours.

## 2022-10-11 NOTE — PLAN OF CARE
[facility]  Discharge Final Note    Primary Care Provider: Basim Guerrero MD    Expected Discharge Date: 10/11/2022    Final Discharge Note (most recent)       Final Note - 10/11/22 1359          Final Note    Assessment Type Final Discharge Note     Anticipated Discharge Disposition Intermediate Care Facility for [facility] or Supportive Care     Hospital Resources/Appts/Education Provided Provided patient/caregiver with written discharge plan information;Appointments scheduled and added to AVS        Post-Acute Status    Post-Acute Authorization Placement     Post-Acute Placement Status Set-up Complete/Auth obtained     Discharge Delays None known at this time                     Important Message from Medicare  Important Message from Medicare regarding Discharge Appeal Rights: Given to patient/caregiver, Explained to patient/caregiver, Signed/date by patient/caregiver     Date IMM was signed: 10/10/22  Time IMM was signed: 1440      Pt went back to [facility] (correction) care.  W/c van transport was used.  No other needs at this time.     DCP:  Critical access hospital    Myrna Badillo RN [facility] [phone]

## 2022-10-11 NOTE — SUBJECTIVE & OBJECTIVE
Interval HPI:   Overnight events: blood sugars tightly controlled    BP (!) 100/59   Pulse 71   Temp 97 °F (36.1 °C) (Oral)   Resp 18   Wt 86.2 kg (190 lb)   SpO2 (!) 94%   BMI 24.39 kg/m²     Labs Reviewed and Include    Recent Labs   Lab 10/11/22  0546      CALCIUM 8.4*   ALBUMIN 2.4*   PROT 6.0      K 3.7   CO2 26      BUN 11   CREATININE 0.8   ALKPHOS 134   ALT 17   AST 24   BILITOT 0.4     Lab Results   Component Value Date    WBC 7.76 10/11/2022    HGB 12.4 (L) 10/11/2022    HCT 37.2 (L) 10/11/2022    MCV 90 10/11/2022     10/11/2022     No results for input(s): TSH, FREET4 in the last 168 hours.  Lab Results   Component Value Date    HGBA1C 10.6 (H) 10/05/2022       Nutritional status:   Body mass index is 24.39 kg/m².  Lab Results   Component Value Date    ALBUMIN 2.4 (L) 10/11/2022    ALBUMIN 2.4 (L) 10/10/2022    ALBUMIN 2.4 (L) 10/09/2022     No results found for: PREALBUMIN    Estimated Creatinine Clearance: 87.1 mL/min (based on SCr of 0.8 mg/dL).    Accu-Checks  Recent Labs     10/08/22  1925 10/09/22  0703 10/09/22  1116 10/09/22  1611 10/09/22  1959 10/10/22  0809 10/10/22  1141 10/10/22  1609 10/10/22  2000 10/11/22  0711   POCTGLUCOSE 137* 139* 192* 230* 114* 206* 347* 262* 171* 109       Current Medications and/or Treatments Impacting Glycemic Control  Immunotherapy:    Immunosuppressants       None          Steroids:   Hormones (From admission, onward)      None          Pressors:    Autonomic Drugs (From admission, onward)      None          Hyperglycemia/Diabetes Medications:   Antihyperglycemics (From admission, onward)      Start     Stop Route Frequency Ordered    10/08/22 0900  insulin detemir U-100 pen 6 Units         -- SubQ 2 times daily 10/07/22 1046    10/07/22 1730  insulin aspart U-100 pen 8-12 Units         -- SubQ 3 times daily with meals 10/07/22 1715    10/07/22 1145  insulin aspart U-100 pen 0-4 Units         -- SubQ As needed (PRN) 10/07/22 1046

## 2022-10-11 NOTE — PROGRESS NOTES
[facility] - Med Surg  Endocrinology  Progress Note    Admit Date: 10/5/2022     Reason for Consult: Management of T2DM, Hyperglycemia, DKA on admission - resolved at time of endocrine consult    Surgical Procedure and Date: n/a    Diabetes diagnosis year: >20yrs ago    Home Diabetes Medications:  In a nursing home now. Last admission 5/2022 final endocrine recs from inpatient team for insulin regimen at NH were as follows:  - Levemir 6 units twice daily and on insulin aspart 8-16 units (please give 8 units if eats <25%, 12 units with 50 % intake and full dose if eats 100%),  in addition to moderate correction with with meals  -  Aspart 4 units PRN in place for nightly and in between meal snacks  *Fixed doses of insulin we have recommended are to cover meals containing 60 g of carbohydrate and it is very important that meals are consistently containing 60 g of carbs.  If patient exceed 60 g of carbohydrate per meal or consumes any carbohydrates between meals without insulin coverage this will likely again lead to significant hyperglycemia. In addition if he eats < 50 % of meal to only give half the recommended pre-meal dose.      How often checking glucose at home? >4 x day   BG readings on regimen: >200  Hypoglycemia on the regimen?  Yes  Missed doses on regimen?  No    Diabetes Complications include:     Hyperglycemia, Hypoglycemia , and Diabetic chronic kidney disease         Complicating diabetes co morbidities:   History of MI, History of CVA, and CKD    HPI:   Mr. Muñoz is a 78yo M w/ hx of stroke, ketosis prone T2DM (poorly controlled with recurrent DKA), CAD, HLD, paroxysmal Afib, and seizures who presented to the ED via EMS from [facility] with concerns of nausea/vomiting, hypotension, hyperglycemia. Per daughter, he has been depressed and has had very poor oral intake since his wife passed away 4 weeks ago.  He has not been taking care of himself. He was seen by his PCP 3 days prior to  admission, who adjusted his anitdepressants dose. He also had a fall last Friday while he was getting dressed that was described as mild. It was not associated with head trauma, as most of fall was on his elbow.      ED course notable for hyperglycemia, keotacidosis, hypotension requiring vasopressors. He was admitted  to the MICU initially in septic shock, significant lactic acidosis, and DKA. Infectious workup revealed +Bcx. For DKA he was started on the intensive insulin protocol for DKA. He was transitioned to MDI on 10/6 and stepped down to hospital medicine. Endocrine was consulted for BG management.         Interval HPI:   Overnight events: blood sugars tightly controlled    BP (!) 100/59   Pulse 71   Temp 97 °F (36.1 °C) (Oral)   Resp 18   Wt 86.2 kg (190 lb)   SpO2 (!) 94%   BMI 24.39 kg/m²     Labs Reviewed and Include    Recent Labs   Lab 10/11/22  0546      CALCIUM 8.4*   ALBUMIN 2.4*   PROT 6.0      K 3.7   CO2 26      BUN 11   CREATININE 0.8   ALKPHOS 134   ALT 17   AST 24   BILITOT 0.4     Lab Results   Component Value Date    WBC 7.76 10/11/2022    HGB 12.4 (L) 10/11/2022    HCT 37.2 (L) 10/11/2022    MCV 90 10/11/2022     10/11/2022     No results for input(s): TSH, FREET4 in the last 168 hours.  Lab Results   Component Value Date    HGBA1C 10.6 (H) 10/05/2022       Nutritional status:   Body mass index is 24.39 kg/m².  Lab Results   Component Value Date    ALBUMIN 2.4 (L) 10/11/2022    ALBUMIN 2.4 (L) 10/10/2022    ALBUMIN 2.4 (L) 10/09/2022     No results found for: PREALBUMIN    Estimated Creatinine Clearance: 87.1 mL/min (based on SCr of 0.8 mg/dL).    Accu-Checks  Recent Labs     10/08/22  1925 10/09/22  0703 10/09/22  1116 10/09/22  1611 10/09/22  1959 10/10/22  0809 10/10/22  1141 10/10/22  1609 10/10/22  2000 10/11/22  0711   POCTGLUCOSE 137* 139* 192* 230* 114* 206* 347* 262* 171* 109       Current Medications and/or Treatments Impacting Glycemic  Control  Immunotherapy:    Immunosuppressants       None          Steroids:   Hormones (From admission, onward)      None          Pressors:    Autonomic Drugs (From admission, onward)      None          Hyperglycemia/Diabetes Medications:   Antihyperglycemics (From admission, onward)      Start     Stop Route Frequency Ordered    10/08/22 0900  insulin detemir U-100 pen 6 Units         -- SubQ 2 times daily 10/07/22 1046    10/07/22 1730  insulin aspart U-100 pen 8-12 Units         -- SubQ 3 times daily with meals 10/07/22 1715    10/07/22 1145  insulin aspart U-100 pen 0-4 Units         -- SubQ As needed (PRN) 10/07/22 1046            ASSESSMENT and PLAN    Ketosis-prone diabetes mellitus  - Multiple admissions for DKA and has a history of type 2 diabetes mellitus  - Considering labile blood sugars, blood sugar trend in the last 24 hours was reasonable  - Continue Levemir 6 units twice daily   - Continue Aspart 8-12 units based on % of meal eaten (8 units if patient eats < or =25%, 10 units if he eats 50% and 12 units if patient eats >50%)  - Low dose correction scale prn AC and snacks with parameters to not administer aspart doses < 4 hours apart  - Hypoglycemia protocol in place  - If blood glucose greater than 300, please ask patient not to eat food or drink anything other than water until correctional insulin has brought it back below 250  - Please ensure patient receives their p.r.n. insulin aspart if they eat a snack with more than 30 g of carbohydrate    Diabetic ketoacidosis  - presented with diabetic ketoacidosis and shock, initially requiring vasopressors   - suspect infection as trigger for DKA  - DKA has now resolved         Modesto Abraham MD  Endocrinology  [facility]

## 2022-10-11 NOTE — ASSESSMENT & PLAN NOTE
- presented with diabetic ketoacidosis and shock, initially requiring vasopressors   - suspect infection as trigger for DKA  - DKA has now resolved

## 2022-10-11 NOTE — PLAN OF CARE
Pt is AAOx4. Pt remain free from fall and injuries. VS remain stable. Questions and concerns voiced and answered. Medication compliance. Condom cath in place and working well. Discharge order was cancelled yesterday. Call light in reach. Bed in low locked position. Side rails x2. Belongings at bedside.

## 2022-10-11 NOTE — PROGRESS NOTES
[facility] Medicine  Progress Note    Patient Name: Kamar Muñoz  MRN: [id number]  Patient Class: IP- Inpatient   Admission Date: 10/5/2022  Length of Stay: 5 days  Attending Physician: Israel Escamilla MD  Primary Care Provider: Basim Guerrero MD        Subjective:     Principal Problem:Septic shock        HPI:     Mr. Muñoz is a 77 y.o M/ w/ hx of stroke, T2DM(poorly controlled with recurrent DKA), CAD, HLD, paroxysmal Afib, and seizures who presented to the ED via EMS from [facility] with concerns of nausea/vomiting, hypotension, hyperglycemia. Per daughter, he has been depressed and has had very poor oral intake since his wife passed away 4 weeks ago.  He has not been taking care of himself. He was seen by his PCP 3 days ago who adjusted his anitdepressants dose recently. He also had a fall last Friday while he was getting dressed that was described as mild. It was not associated with head trauma, as most of fall was on his elbow.      ED course notable for hyperglycemia, keotacidosis, hypotension requiring vasopressors. Critical Care Medicine was consulted for evaluation.       Overview/Hospital Course:  No acute events overnight, afebrile. Weaned off vasopressors. DKA improving. He is more alert and oriented and appears back to his baseline.         Interval History: Patient lying in bed, no acute distress. Alert and oriented x 3. Reports back pain fairly well controlled. BG controlled with insulin adjustments by endocrinology. Boost TID ordered. Awaiting SNF placement at prior facility.      Review of Systems   Constitutional:  Positive for activity change and fatigue. Negative for chills and fever.   HENT:  Negative for congestion and trouble swallowing.    Eyes:  Negative for visual disturbance.   Respiratory:  Negative for cough and shortness of breath.    Cardiovascular:  Negative for chest pain and leg swelling.   Gastrointestinal:  Negative for  abdominal distention, abdominal pain, nausea and vomiting.   Endocrine: Negative for cold intolerance, heat intolerance, polydipsia and polyuria.   Genitourinary:  Negative for difficulty urinating and dysuria.   Musculoskeletal:  Positive for back pain. Negative for myalgias.   Skin:  Negative for rash and wound.   Neurological:  Positive for weakness. Negative for dizziness and light-headedness.   Hematological:  Negative for adenopathy. Does not bruise/bleed easily.   Psychiatric/Behavioral:  Negative for confusion and sleep disturbance.      Objective:     Vital Signs (Most Recent):  Temp: 97 °F (36.1 °C) (10/10/22 2001)  Pulse: 71 (10/10/22 2001)  Resp: 18 (10/10/22 2001)  BP: (!) 100/59 (10/10/22 2001)  SpO2: (!) 94 % (10/10/22 2001)   Vital Signs (24h Range):  Temp:  [97 °F (36.1 °C)-97.6 °F (36.4 °C)] 97 °F (36.1 °C)  Pulse:  [64-71] 71  Resp:  [16-19] 18  SpO2:  [92 %-94 %] 94 %  BP: (100-181)/(59-88) 100/59     Weight: 86.2 kg (190 lb)  Body mass index is 24.39 kg/m².    Intake/Output Summary (Last 24 hours) at 10/10/2022 2255  Last data filed at 10/10/2022 0558  Gross per 24 hour   Intake --   Output 300 ml   Net -300 ml        Physical Exam  Constitutional:       Appearance: He is well-developed.   HENT:      Head: Normocephalic and atraumatic.   Eyes:      General: No scleral icterus.     Pupils: Pupils are equal, round, and reactive to light.   Neck:      Vascular: No JVD.   Cardiovascular:      Rate and Rhythm: Normal rate and regular rhythm.      Heart sounds: No murmur heard.    No friction rub. No gallop.   Pulmonary:      Effort: Pulmonary effort is normal. No respiratory distress.      Breath sounds: Normal breath sounds. No wheezing or rales.   Abdominal:      General: Bowel sounds are normal. There is no distension.      Palpations: Abdomen is soft.      Tenderness: There is no abdominal tenderness. There is no guarding or rebound.   Musculoskeletal:         General: No deformity. Normal range  of motion.      Cervical back: Neck supple.   Lymphadenopathy:      Cervical: No cervical adenopathy.   Skin:     General: Skin is warm and dry.      Capillary Refill: Capillary refill takes less than 2 seconds.      Findings: No erythema or rash.   Neurological:      Mental Status: He is alert and oriented to person, place, and time.      Cranial Nerves: No cranial nerve deficit.      Sensory: No sensory deficit.   Psychiatric:         Judgment: Judgment normal.       Significant Labs: A1C:   Recent Labs   Lab 10/05/22  1724   HGBA1C 10.6*       CBC:   Recent Labs   Lab 10/09/22  0745 10/10/22  0308   WBC 6.16 6.42   HGB 12.9* 12.1*   HCT 40.7 37.6*   * 142*       CMP:   Recent Labs   Lab 10/09/22  0745 10/10/22  0308   * 134*   K 4.1 3.7    103   CO2 22* 23   * 131*   BUN 12 13   CREATININE 0.8 0.8   CALCIUM 8.5* 8.4*   PROT 6.0 5.8*   ALBUMIN 2.4* 2.4*   BILITOT 0.5 0.4   ALKPHOS 124 121   AST 26 21   ALT 21 17   ANIONGAP 8 8         Significant Imaging: I have reviewed all pertinent imaging results/findings within the past 24 hours.      Assessment/Plan:      Positive blood cultures  -CXR nonacute. CT abdomen concerning for stercoral colitis.  -blood cultures grew bacillus in 1 out of 2 bottles and likely a contaminate         Blood in stool  -Hx of apixaban and clopidogrel use.   -Overnight on 10/06 nursing noted blood in stool. CT abdomen noted stercoral proctitis.  -Hgb drop, but unclear if from improvement in DKA associated dehydration vs true bleed      Pneumocephalus  -Per radiology, differential considerations include intravenous induced pneumocephalus or barotrauma (i.e. nose blowing).  Septic thrombosis or craniofacial trauma felt less likely.   - no active issues      Lactic acidosis  Lactic acidosis on admission likely secondary to dehydration from DKA  - RESOLVED      Goals of care, counseling/discussion  Advance Care Planning     Code Status  In light of the patients  advanced and life limiting illness,I engaged the the patient and family in a conversation about the patient's preferences for care  at the very end of life. The patient wishes to have a natural, peaceful death.  Along those lines, the patient does not wish to have CPR or other invasive treatments performed when his heart and/or breathing stops. I communicated to the patient and family that a DNR order would be placed in his medical record to reflect this preference.  I spent a total of 45 minutes engaging the patient in this advance care planning discussion.         Chronic anticoagulation  - Continue home apixaban      Debility  - PT/OT recommending SNF      Adjustment disorder with disturbance of conduct  - Recently worsening depression due to loss of spouse in 2022  - Continue home Sertraline      Diabetic ketoacidosis  Patient's FSGs are controlled on current medication regimen.  Last A1c reviewed-   Lab Results   Component Value Date    HGBA1C 10.6 (H) 10/05/2022     Most recent fingerstick glucose reviewed-   Recent Labs   Lab 10/10/22  0809 10/10/22  1141 10/10/22  1609 10/10/22  2000   POCTGLUCOSE 206* 347* 262* 171*     Current correctional scale  Low  Maintain anti-hyperglycemic dose as follows-   Antihyperglycemics (From admission, onward)    Start     Stop Route Frequency Ordered    10/08/22 0900  insulin detemir U-100 pen 6 Units         -- SubQ 2 times daily 10/07/22 1046    10/07/22 1730  insulin aspart U-100 pen 8-12 Units         -- SubQ 3 times daily with meals 10/07/22 1715    10/07/22 1145  insulin aspart U-100 pen 0-4 Units         -- SubQ As needed (PRN) 10/07/22 1046        Hold Oral hypoglycemics while patient is in the h  ospital.  - endocrinology consulted, apprec recs   -- P/w DKA and shock, initially requiring vasopressors   -- Suspect infection as trigger for DKA? Unclear source as +BCx suspected to be contaminant  -- completed insulin gtt and DKA now resolved   -- continue  "Levemir 6 units twice daily   - continue Aspart 8-12 unitt based on % of meal eaten -- 8 units if patient eats < or =25%, 10 units if pt eats 50% and 12 units if patient eats >50%.  -- Low dose correction scale prn AC and snacks with parameters to not administer aspart doses <4hrs apart  - RESOLVED       Ketosis-prone diabetes mellitus  - see "DKA"    History of partial seizures  - history of focal seizures and history stroke  -Continue home Lacosamide  -Followup Lacosamide levels         Encephalopathy, metabolic  Patient has acute metabolic encephalopathy that is likely secondary to DKA, dehydration.  Treatment for underlying cause is underway.      CT head did not note any acute infarct or intracranial hemorrhage.      PLAN  -DDx: hyperglycemia vs delirium vs UTI   - DKA treatment  -Followup Blood cultures grew bacillus in 1 out of 2 bottles which was likely contaminate  - urinalysis positive for UTI. Ucx showed no predominant organisms. Switched from zosyn to augmentin  -Followup Lacosamide levels  -Will monitor neuro checks carefully, avoid narcotics and benzos that will exacerbate agitation, and use PRN anti-psychotics for controls of behavior for self harm.      History of ST elevation myocardial infarction (STEMI)  See "Coronary artery disease involving native coronary artery of native heart with unstable angina pectoris"      Paroxysmal atrial fibrillation  - EKG on admit sinus rhythm  - Continue home amiodarone, held home metoprolol due to hypotension. Will resume when clinically appropriate          Coronary artery disease involving native coronary artery of native heart with unstable angina pectoris    -Hx of CAD s/p placement of 2 LAUREN in RCA in 01/09/2022.   -Patient denied chest pain on admission, troponin WNL  -EKG on admission(10/05) Sinus rhythm with 1st degree A-V block  -Per chart review, "ASA discontinued 4/13 due to bleeding risk with triple therapy; continuing Plavix and Eliquis" "No need for " "triple therapy (ASA/Plavix/eliquis) beyond 1 month"     PLAN  -Continue home statin  -Continue home apixaban, clopidogrel   -Metoprolol and Losartan held due to hypotension. Will resume with clinically appropriate       Type 2 diabetes mellitus with hyperglycemia, with long-term current use of insulin  Patient's FSGs are uncontrolled due to hyperglycemia on current medication regimen.  Last A1c reviewed-   Lab Results   Component Value Date    HGBA1C 10.6 (H) 10/05/2022     Most recent fingerstick glucose reviewed-   Recent Labs   Lab 10/07/22  2034 10/08/22  0719 10/08/22  1104 10/08/22  1615   POCTGLUCOSE 223* 229* 166* 178*     Current correctional scale  Medium  Maintain anti-hyperglycemic dose as follows-   Antihyperglycemics (From admission, onward)    Start     Stop Route Frequency Ordered    10/08/22 0900  insulin detemir U-100 pen 6 Units         -- SubQ 2 times daily 10/07/22 1046    10/07/22 1730  insulin aspart U-100 pen 8-12 Units         -- SubQ 3 times daily with meals 10/07/22 1715    10/07/22 1145  insulin aspart U-100 pen 0-4 Units         -- SubQ As needed (PRN) 10/07/22 1046        Hold Oral hypoglycemics while patient is in the hospital.    Essential hypertension  - holding home Metoprolol, losartan due to hypotension. Resumed as clinically appropriate        VTE Risk Mitigation (From admission, onward)         Ordered     apixaban tablet 5 mg  2 times daily         10/05/22 2325     IP VTE HIGH RISK PATIENT  Once         10/05/22 1806     Place LUANN hose  Until discontinued         10/05/22 1806     Place sequential compression device  Until discontinued         10/05/22 1806     Place sequential compression device  Until discontinued         10/05/22 1754                Discharge Planning   ALLIE: 10/10/2022     Code Status: DNR   Is the patient medically ready for discharge?: Yes    Reason for patient still in hospital (select all that apply): Patient trending condition, Laboratory test, " Treatment, Consult recommendations and Pending disposition  Discharge Plan A: Return to nursing home          Time spent in care of patient: > 35 minutes           Israel Escamilla MD  Department of Hospital Medicine   [facility]

## 2022-10-12 ENCOUNTER — HOSPITAL ENCOUNTER (OUTPATIENT)
Facility: HOSPITAL | Age: 77
Discharge: NURSING, REHAB OR LONG-TERM CARE | End: 2022-10-15
Attending: EMERGENCY MEDICINE | Admitting: STUDENT IN AN ORGANIZED HEALTH CARE EDUCATION/TRAINING PROGRAM
Payer: MEDICARE

## 2022-10-12 DIAGNOSIS — E11.10 DIABETIC KETOACIDOSIS WITHOUT COMA ASSOCIATED WITH TYPE 2 DIABETES MELLITUS: ICD-10-CM

## 2022-10-12 DIAGNOSIS — N17.9 AKI (ACUTE KIDNEY INJURY): Primary | ICD-10-CM

## 2022-10-12 DIAGNOSIS — R73.9 HYPERGLYCEMIA: ICD-10-CM

## 2022-10-12 DIAGNOSIS — Z09 HOSPITAL DISCHARGE FOLLOW-UP: ICD-10-CM

## 2022-10-12 PROBLEM — I25.2 HISTORY OF ST ELEVATION MYOCARDIAL INFARCTION (STEMI): Chronic | Status: RESOLVED | Noted: 2022-01-19 | Resolved: 2022-10-12

## 2022-10-12 PROBLEM — E86.1 HYPOTENSION DUE TO HYPOVOLEMIA: Status: ACTIVE | Noted: 2022-10-12

## 2022-10-12 LAB
ALBUMIN SERPL BCP-MCNC: 2.5 G/DL (ref 3.5–5.2)
ALLENS TEST: ABNORMAL
ALP SERPL-CCNC: 139 U/L (ref 55–135)
ALT SERPL W/O P-5'-P-CCNC: 24 U/L (ref 10–44)
ANION GAP SERPL CALC-SCNC: 5 MMOL/L (ref 8–16)
ANION GAP SERPL CALC-SCNC: 9 MMOL/L (ref 8–16)
AST SERPL-CCNC: 44 U/L (ref 10–40)
B-OH-BUTYR BLD STRIP-SCNC: 0.1 MMOL/L (ref 0–0.5)
BACTERIA #/AREA URNS AUTO: ABNORMAL /HPF
BASOPHILS # BLD AUTO: 0.04 K/UL (ref 0–0.2)
BASOPHILS NFR BLD: 0.5 % (ref 0–1.9)
BILIRUB SERPL-MCNC: 0.3 MG/DL (ref 0.1–1)
BILIRUB UR QL STRIP: NEGATIVE
BUN SERPL-MCNC: 19 MG/DL (ref 8–23)
BUN SERPL-MCNC: 23 MG/DL (ref 8–23)
CALCIUM SERPL-MCNC: 8.4 MG/DL (ref 8.7–10.5)
CALCIUM SERPL-MCNC: 8.5 MG/DL (ref 8.7–10.5)
CHLORIDE SERPL-SCNC: 103 MMOL/L (ref 95–110)
CHLORIDE SERPL-SCNC: 106 MMOL/L (ref 95–110)
CLARITY UR REFRACT.AUTO: CLEAR
CO2 SERPL-SCNC: 22 MMOL/L (ref 23–29)
CO2 SERPL-SCNC: 26 MMOL/L (ref 23–29)
COLOR UR AUTO: YELLOW
CREAT SERPL-MCNC: 1.3 MG/DL (ref 0.5–1.4)
CREAT SERPL-MCNC: 1.6 MG/DL (ref 0.5–1.4)
CREAT UR-MCNC: 54 MG/DL (ref 23–375)
DELSYS: ABNORMAL
DIFFERENTIAL METHOD: ABNORMAL
EOSINOPHIL # BLD AUTO: 0.3 K/UL (ref 0–0.5)
EOSINOPHIL NFR BLD: 3.6 % (ref 0–8)
ERYTHROCYTE [DISTWIDTH] IN BLOOD BY AUTOMATED COUNT: 11.7 % (ref 11.5–14.5)
EST. GFR  (NO RACE VARIABLE): 43.6 ML/MIN/1.73 M^2
EST. GFR  (NO RACE VARIABLE): 55.9 ML/MIN/1.73 M^2
GLUCOSE SERPL-MCNC: 304 MG/DL (ref 70–110)
GLUCOSE SERPL-MCNC: 497 MG/DL (ref 70–110)
GLUCOSE UR QL STRIP: ABNORMAL
HCO3 UR-SCNC: 26.6 MMOL/L (ref 24–28)
HCT VFR BLD AUTO: 39.3 % (ref 40–54)
HGB BLD-MCNC: 12.8 G/DL (ref 14–18)
HGB UR QL STRIP: NEGATIVE
IMM GRANULOCYTES # BLD AUTO: 0.03 K/UL (ref 0–0.04)
IMM GRANULOCYTES NFR BLD AUTO: 0.4 % (ref 0–0.5)
KETONES UR QL STRIP: NEGATIVE
LACTATE SERPL-SCNC: 2.2 MMOL/L (ref 0.5–2.2)
LACTATE SERPL-SCNC: 3.2 MMOL/L (ref 0.5–2.2)
LEUKOCYTE ESTERASE UR QL STRIP: ABNORMAL
LYMPHOCYTES # BLD AUTO: 1.1 K/UL (ref 1–4.8)
LYMPHOCYTES NFR BLD: 14 % (ref 18–48)
MCH RBC QN AUTO: 30.5 PG (ref 27–31)
MCHC RBC AUTO-ENTMCNC: 32.6 G/DL (ref 32–36)
MCV RBC AUTO: 94 FL (ref 82–98)
MICROSCOPIC COMMENT: ABNORMAL
MONOCYTES # BLD AUTO: 1 K/UL (ref 0.3–1)
MONOCYTES NFR BLD: 12.7 % (ref 4–15)
NEUTROPHILS # BLD AUTO: 5.3 K/UL (ref 1.8–7.7)
NEUTROPHILS NFR BLD: 68.8 % (ref 38–73)
NITRITE UR QL STRIP: NEGATIVE
NRBC BLD-RTO: 0 /100 WBC
OSMOLALITY SERPL: 304 MOSM/KG (ref 280–300)
PCO2 BLDA: 52.5 MMHG (ref 35–45)
PH SMN: 7.31 [PH] (ref 7.35–7.45)
PH UR STRIP: 5 [PH] (ref 5–8)
PLATELET # BLD AUTO: 205 K/UL (ref 150–450)
PMV BLD AUTO: 10 FL (ref 9.2–12.9)
PO2 BLDA: 32 MMHG (ref 40–60)
POC BE: 0 MMOL/L
POC SATURATED O2: 54 % (ref 95–100)
POC TCO2: 28 MMOL/L (ref 24–29)
POCT GLUCOSE: 291 MG/DL (ref 70–110)
POCT GLUCOSE: >500 MG/DL (ref 70–110)
POTASSIUM SERPL-SCNC: 4.1 MMOL/L (ref 3.5–5.1)
POTASSIUM SERPL-SCNC: 4.2 MMOL/L (ref 3.5–5.1)
PROT SERPL-MCNC: 6.3 G/DL (ref 6–8.4)
PROT UR QL STRIP: NEGATIVE
RBC # BLD AUTO: 4.2 M/UL (ref 4.6–6.2)
RBC #/AREA URNS AUTO: 4 /HPF (ref 0–4)
SAMPLE: ABNORMAL
SITE: ABNORMAL
SODIUM SERPL-SCNC: 134 MMOL/L (ref 136–145)
SODIUM SERPL-SCNC: 137 MMOL/L (ref 136–145)
SODIUM UR-SCNC: 56 MMOL/L (ref 20–250)
SP GR UR STRIP: 1.03 (ref 1–1.03)
URN SPEC COLLECT METH UR: ABNORMAL
UUN UR-MCNC: 428 MG/DL (ref 140–1050)
WBC # BLD AUTO: 7.7 K/UL (ref 3.9–12.7)
WBC #/AREA URNS AUTO: 97 /HPF (ref 0–5)
WBC CLUMPS UR QL AUTO: ABNORMAL
YEAST UR QL AUTO: ABNORMAL

## 2022-10-12 PROCEDURE — 93005 ELECTROCARDIOGRAM TRACING: CPT

## 2022-10-12 PROCEDURE — 80048 BASIC METABOLIC PNL TOTAL CA: CPT | Mod: XB

## 2022-10-12 PROCEDURE — 85025 COMPLETE CBC W/AUTO DIFF WBC: CPT | Performed by: EMERGENCY MEDICINE

## 2022-10-12 PROCEDURE — 25000003 PHARM REV CODE 250

## 2022-10-12 PROCEDURE — 27000221 HC OXYGEN, UP TO 24 HOURS

## 2022-10-12 PROCEDURE — 87040 BLOOD CULTURE FOR BACTERIA: CPT | Performed by: EMERGENCY MEDICINE

## 2022-10-12 PROCEDURE — 96375 TX/PRO/DX INJ NEW DRUG ADDON: CPT

## 2022-10-12 PROCEDURE — G0378 HOSPITAL OBSERVATION PER HR: HCPCS

## 2022-10-12 PROCEDURE — 84300 ASSAY OF URINE SODIUM: CPT | Performed by: EMERGENCY MEDICINE

## 2022-10-12 PROCEDURE — 83930 ASSAY OF BLOOD OSMOLALITY: CPT | Performed by: EMERGENCY MEDICINE

## 2022-10-12 PROCEDURE — 82803 BLOOD GASES ANY COMBINATION: CPT

## 2022-10-12 PROCEDURE — 63600175 PHARM REV CODE 636 W HCPCS: Performed by: EMERGENCY MEDICINE

## 2022-10-12 PROCEDURE — 93010 ELECTROCARDIOGRAM REPORT: CPT | Mod: ,,, | Performed by: INTERNAL MEDICINE

## 2022-10-12 PROCEDURE — 93010 EKG 12-LEAD: ICD-10-PCS | Mod: ,,, | Performed by: INTERNAL MEDICINE

## 2022-10-12 PROCEDURE — 99291 PR CRITICAL CARE, E/M 30-74 MINUTES: ICD-10-PCS | Mod: ,,, | Performed by: EMERGENCY MEDICINE

## 2022-10-12 PROCEDURE — 99291 CRITICAL CARE FIRST HOUR: CPT | Mod: 25

## 2022-10-12 PROCEDURE — 82962 GLUCOSE BLOOD TEST: CPT

## 2022-10-12 PROCEDURE — 99291 CRITICAL CARE FIRST HOUR: CPT | Mod: ,,, | Performed by: EMERGENCY MEDICINE

## 2022-10-12 PROCEDURE — 94761 N-INVAS EAR/PLS OXIMETRY MLT: CPT

## 2022-10-12 PROCEDURE — 84540 ASSAY OF URINE/UREA-N: CPT | Performed by: EMERGENCY MEDICINE

## 2022-10-12 PROCEDURE — 96361 HYDRATE IV INFUSION ADD-ON: CPT

## 2022-10-12 PROCEDURE — 87086 URINE CULTURE/COLONY COUNT: CPT | Performed by: EMERGENCY MEDICINE

## 2022-10-12 PROCEDURE — 82010 KETONE BODYS QUAN: CPT | Performed by: EMERGENCY MEDICINE

## 2022-10-12 PROCEDURE — 82570 ASSAY OF URINE CREATININE: CPT | Performed by: EMERGENCY MEDICINE

## 2022-10-12 PROCEDURE — 63600175 PHARM REV CODE 636 W HCPCS: Mod: GZ | Performed by: EMERGENCY MEDICINE

## 2022-10-12 PROCEDURE — 81001 URINALYSIS AUTO W/SCOPE: CPT | Performed by: EMERGENCY MEDICINE

## 2022-10-12 PROCEDURE — 83605 ASSAY OF LACTIC ACID: CPT | Mod: 91 | Performed by: EMERGENCY MEDICINE

## 2022-10-12 PROCEDURE — 80053 COMPREHEN METABOLIC PANEL: CPT | Performed by: EMERGENCY MEDICINE

## 2022-10-12 PROCEDURE — 25000003 PHARM REV CODE 250: Performed by: EMERGENCY MEDICINE

## 2022-10-12 PROCEDURE — 99900035 HC TECH TIME PER 15 MIN (STAT)

## 2022-10-12 RX ORDER — NITROGLYCERIN 0.4 MG/1
0.4 TABLET SUBLINGUAL EVERY 5 MIN PRN
Status: DISCONTINUED | OUTPATIENT
Start: 2022-10-13 | End: 2022-10-15 | Stop reason: HOSPADM

## 2022-10-12 RX ORDER — SODIUM CHLORIDE 9 MG/ML
125 INJECTION, SOLUTION INTRAVENOUS CONTINUOUS
Status: DISCONTINUED | OUTPATIENT
Start: 2022-10-12 | End: 2022-10-12

## 2022-10-12 RX ORDER — CETIRIZINE HYDROCHLORIDE 10 MG/1
10 TABLET ORAL NIGHTLY
Status: DISCONTINUED | OUTPATIENT
Start: 2022-10-13 | End: 2022-10-15 | Stop reason: HOSPADM

## 2022-10-12 RX ORDER — ACETAMINOPHEN 325 MG/1
650 TABLET ORAL EVERY 4 HOURS PRN
Status: DISCONTINUED | OUTPATIENT
Start: 2022-10-12 | End: 2022-10-15 | Stop reason: HOSPADM

## 2022-10-12 RX ORDER — SERTRALINE HYDROCHLORIDE 50 MG/1
50 TABLET, FILM COATED ORAL DAILY
Status: DISCONTINUED | OUTPATIENT
Start: 2022-10-13 | End: 2022-10-15 | Stop reason: HOSPADM

## 2022-10-12 RX ORDER — POTASSIUM CHLORIDE 20 MEQ/1
40 TABLET, EXTENDED RELEASE ORAL
Status: COMPLETED | OUTPATIENT
Start: 2022-10-12 | End: 2022-10-12

## 2022-10-12 RX ORDER — SODIUM CHLORIDE 0.9 % (FLUSH) 0.9 %
10 SYRINGE (ML) INJECTION
Status: DISCONTINUED | OUTPATIENT
Start: 2022-10-12 | End: 2022-10-15 | Stop reason: HOSPADM

## 2022-10-12 RX ORDER — METHOCARBAMOL 500 MG/1
500 TABLET, FILM COATED ORAL 3 TIMES DAILY
Status: DISCONTINUED | OUTPATIENT
Start: 2022-10-13 | End: 2022-10-14

## 2022-10-12 RX ORDER — LACOSAMIDE 50 MG/1
100 TABLET ORAL EVERY 12 HOURS
Status: DISCONTINUED | OUTPATIENT
Start: 2022-10-13 | End: 2022-10-15 | Stop reason: HOSPADM

## 2022-10-12 RX ORDER — AMIODARONE HYDROCHLORIDE 200 MG/1
200 TABLET ORAL DAILY
Status: DISCONTINUED | OUTPATIENT
Start: 2022-10-13 | End: 2022-10-15 | Stop reason: HOSPADM

## 2022-10-12 RX ORDER — DEXTROSE MONOHYDRATE AND SODIUM CHLORIDE 5; .45 G/100ML; G/100ML
125 INJECTION, SOLUTION INTRAVENOUS CONTINUOUS
Status: DISCONTINUED | OUTPATIENT
Start: 2022-10-12 | End: 2022-10-12

## 2022-10-12 RX ORDER — INSULIN ASPART 100 [IU]/ML
1-10 INJECTION, SOLUTION INTRAVENOUS; SUBCUTANEOUS
Status: DISCONTINUED | OUTPATIENT
Start: 2022-10-12 | End: 2022-10-15 | Stop reason: HOSPADM

## 2022-10-12 RX ORDER — GABAPENTIN 100 MG/1
100 CAPSULE ORAL 2 TIMES DAILY
Status: DISCONTINUED | OUTPATIENT
Start: 2022-10-12 | End: 2022-10-12

## 2022-10-12 RX ORDER — IBUPROFEN 200 MG
16 TABLET ORAL
Status: DISCONTINUED | OUTPATIENT
Start: 2022-10-12 | End: 2022-10-15 | Stop reason: HOSPADM

## 2022-10-12 RX ORDER — CLOPIDOGREL BISULFATE 75 MG/1
75 TABLET ORAL DAILY
Status: DISCONTINUED | OUTPATIENT
Start: 2022-10-13 | End: 2022-10-15 | Stop reason: HOSPADM

## 2022-10-12 RX ORDER — GABAPENTIN 100 MG/1
100 CAPSULE ORAL 2 TIMES DAILY
Status: DISCONTINUED | OUTPATIENT
Start: 2022-10-13 | End: 2022-10-14

## 2022-10-12 RX ORDER — INSULIN ASPART 100 [IU]/ML
8 INJECTION, SOLUTION INTRAVENOUS; SUBCUTANEOUS
Status: DISCONTINUED | OUTPATIENT
Start: 2022-10-13 | End: 2022-10-13

## 2022-10-12 RX ORDER — IBUPROFEN 200 MG
24 TABLET ORAL
Status: DISCONTINUED | OUTPATIENT
Start: 2022-10-12 | End: 2022-10-15 | Stop reason: HOSPADM

## 2022-10-12 RX ORDER — GLUCAGON 1 MG
1 KIT INJECTION
Status: DISCONTINUED | OUTPATIENT
Start: 2022-10-12 | End: 2022-10-15 | Stop reason: HOSPADM

## 2022-10-12 RX ORDER — TAMSULOSIN HYDROCHLORIDE 0.4 MG/1
0.4 CAPSULE ORAL NIGHTLY
Status: DISCONTINUED | OUTPATIENT
Start: 2022-10-13 | End: 2022-10-15 | Stop reason: HOSPADM

## 2022-10-12 RX ORDER — ATORVASTATIN CALCIUM 40 MG/1
40 TABLET, FILM COATED ORAL DAILY
Status: DISCONTINUED | OUTPATIENT
Start: 2022-10-13 | End: 2022-10-15 | Stop reason: HOSPADM

## 2022-10-12 RX ORDER — PANTOPRAZOLE SODIUM 40 MG/1
40 TABLET, DELAYED RELEASE ORAL DAILY
Status: DISCONTINUED | OUTPATIENT
Start: 2022-10-13 | End: 2022-10-15 | Stop reason: HOSPADM

## 2022-10-12 RX ORDER — ONDANSETRON 2 MG/ML
4 INJECTION INTRAMUSCULAR; INTRAVENOUS EVERY 6 HOURS PRN
Status: DISCONTINUED | OUTPATIENT
Start: 2022-10-12 | End: 2022-10-15 | Stop reason: HOSPADM

## 2022-10-12 RX ADMIN — POTASSIUM CHLORIDE 40 MEQ: 1500 TABLET, EXTENDED RELEASE ORAL at 09:10

## 2022-10-12 RX ADMIN — SODIUM CHLORIDE 1000 ML: 0.9 INJECTION, SOLUTION INTRAVENOUS at 06:10

## 2022-10-12 RX ADMIN — INSULIN HUMAN 6 UNITS: 100 INJECTION, SOLUTION PARENTERAL at 09:10

## 2022-10-12 RX ADMIN — APIXABAN 5 MG: 5 TABLET, FILM COATED ORAL at 11:10

## 2022-10-12 NOTE — ED PROVIDER NOTES
Encounter Date: 10/12/2022       History     Chief Complaint   Patient presents with    Hyperglycemia     Arrives via EMS with c/o hyperglycemia, CBG reading >500, pt is coming from [facility]'s was just discharged from this facility was same thing      Pt is a 78 yo M with PMH of STEMI, HTN, HLD, kidney stone, DM2, Covid who presents with hyperglycemia. Blood glucose >500 at home. He was just discharged within  the last 12 hours after being hospitalized for DKA. He reports he is at the SNF and feels pretty good otherwise.      The history is provided by the patient, medical records and a relative.   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    Hyperlipidemia     Hypertension     Kidney stone     Migraine headache     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 08/02/2012    STEMI involving right coronary artery 01/09/2022    Type II or unspecified type diabetes mellitus with neurological manifestations, uncontrolled(250.62) 03/08/2013    Urinary tract infection      Past Surgical History:   Procedure Laterality Date    BACK SURGERY      CATARACT EXTRACTION W/  INTRAOCULAR LENS IMPLANT Right     Per Dr Romero note 11/2018    COLONOSCOPY N/A 1/28/2019    Procedure: COLONOSCOPY Suprep;  Surgeon: Anh Johnson MD;  Location: [facility];  Service: Endoscopy;  Laterality: N/A;    ESOPHAGOGASTRODUODENOSCOPY N/A 9/15/2022    Procedure: EGD (ESOPHAGOGASTRODUODENOSCOPY);  Surgeon: Dashawn Evans MD;  Location: [facility];  Service: Endoscopy;  Laterality: N/A;    EYE SURGERY      HERNIA REPAIR      LEFT HEART CATHETERIZATION Left 1/9/2022    Procedure:  CATHETERIZATION, HEART, LEFT;  Surgeon: Will Hurst III, MD;  Location: [facility] CATH LAB/EP;  Service: Cardiology;  Laterality: Left;    renal stones      SHOULDER OPEN ROTATOR CUFF REPAIR       Family History   Problem Relation Age of Onset    Diabetes Father     Prostate cancer Neg Hx     Kidney disease Neg Hx      Social History     Tobacco Use    Smoking status: Former     Packs/day: 1.50     Years: 25.00     Pack years: 37.50     Types: Cigarettes     Quit date: 1983     Years since quittin.8    Smokeless tobacco: Never   Substance Use Topics    Alcohol use: No    Drug use: No     Review of Systems  General: No fever.  No chills.  Eyes: No visual changes.  Head: No headache.    Integument: No rashes or lesions.  Chest: No shortness of breath.  Cardiovascular: No chest pain.  Abdomen: No abdominal pain.  No nausea or vomiting.  Urinary: No abnormal urination.  Neurologic: No focal weakness.  No numbness.  Hematologic: No easy bruising.  Endocrine: No excessive thirst or urination.    Physical Exam     Initial Vitals   BP Pulse Resp Temp SpO2   10/12/22 1541 10/12/22 1541 10/12/22 1541 10/12/22 1542 10/12/22 1541   110/68 70 16 97.6 °F (36.4 °C) 98 %      MAP       --                Physical Exam    ED Course   Procedures  Labs Reviewed   CBC W/ AUTO DIFFERENTIAL - Abnormal; Notable for the following components:       Result Value    RBC 4.20 (*)     Hemoglobin 12.8 (*)     Hematocrit 39.3 (*)     Lymph % 14.0 (*)     All other components within normal limits   COMPREHENSIVE METABOLIC PANEL - Abnormal; Notable for the following components:    Sodium 134 (*)     CO2 22 (*)     Glucose 497 (*)     Creatinine 1.6 (*)     Calcium 8.5 (*)     Albumin 2.5 (*)     Alkaline Phosphatase 139 (*)     AST 44 (*)     eGFR 43.6 (*)     All other components within normal limits   URINALYSIS, REFLEX TO URINE CULTURE - Abnormal; Notable for the following components:    Glucose, UA 4+ (*)     Leukocytes, UA 3+ (*)      All other components within normal limits    Narrative:     Specimen Source->Urine   LACTIC ACID, PLASMA - Abnormal; Notable for the following components:    Lactate (Lactic Acid) 3.2 (*)     All other components within normal limits   OSMOLALITY, SERUM - Abnormal; Notable for the following components:    Osmolality 304 (*)     All other components within normal limits   URINALYSIS MICROSCOPIC - Abnormal; Notable for the following components:    WBC, UA 97 (*)     WBC Clumps, UA Occasional (*)     All other components within normal limits    Narrative:     Specimen Source->Urine   BASIC METABOLIC PANEL - Abnormal; Notable for the following components:    Glucose 304 (*)     Calcium 8.4 (*)     Anion Gap 5 (*)     eGFR 55.9 (*)     All other components within normal limits   PHOSPHORUS - Abnormal; Notable for the following components:    Phosphorus 2.6 (*)     All other components within normal limits   CBC W/ AUTO DIFFERENTIAL - Abnormal; Notable for the following components:    RBC 3.86 (*)     Hemoglobin 11.4 (*)     Hematocrit 35.4 (*)     RDW 11.3 (*)     All other components within normal limits   POCT GLUCOSE - Abnormal; Notable for the following components:    POCT Glucose >500 (*)     All other components within normal limits   ISTAT PROCEDURE - Abnormal; Notable for the following components:    POC PH 7.312 (*)     POC PCO2 52.5 (*)     POC PO2 32 (*)     POC SATURATED O2 54 (*)     All other components within normal limits   POCT GLUCOSE - Abnormal; Notable for the following components:    POCT Glucose 291 (*)     All other components within normal limits   POCT GLUCOSE - Abnormal; Notable for the following components:    POCT Glucose 308 (*)     All other components within normal limits   POCT GLUCOSE - Abnormal; Notable for the following components:    POCT Glucose 474 (*)     All other components within normal limits   POCT GLUCOSE - Abnormal; Notable for the following components:    POCT Glucose 322  (*)     All other components within normal limits   POCT GLUCOSE - Abnormal; Notable for the following components:    POCT Glucose 291 (*)     All other components within normal limits   BETA - HYDROXYBUTYRATE, SERUM   GLUCOSE, RANDOM   LACTIC ACID, PLASMA   OSMOLALITY, SERUM   CREATININE, URINE, RANDOM   SODIUM, URINE, RANDOM   UREA NITROGEN, URINE, RANDOM   CREATININE, URINE, RANDOM    Narrative:     ADD ON URINE CREATININE, SODIUM AND UREA NITROGEN PER DR THU AMARAL/ORDER# [id number] AND [id number] @ 22:45      Specimen Source->Urine   UREA NITROGEN, URINE, RANDOM    Narrative:     ADD ON URINE CREATININE, SODIUM AND UREA NITROGEN PER DR THU AMARAL/ORDER# [id number] AND [id number] @ 22:45      Specimen Source->Urine   SODIUM, URINE, RANDOM    Narrative:     ADD ON URINE CREATININE, SODIUM AND UREA NITROGEN PER DR TUH AMARAL/ORDER# [id number] AND [id number] @ 22:45      Specimen Source->Urine   MAGNESIUM   COMPREHENSIVE METABOLIC PANEL   POCT GLUCOSE MONITORING CONTINUOUS        ECG Results              EKG 12-lead (Final result)  Result time 10/13/22 09:05:03      Final result by Interface, Lab In [facility] (10/13/22 09:05:03)                   Narrative:    Test Reason : R73.9,    Vent. Rate : 063 BPM     Atrial Rate : 063 BPM     P-R Int : 196 ms          QRS Dur : 108 ms      QT Int : 456 ms       P-R-T Axes : 078 001 -27 degrees     QTc Int : 466 ms    Normal sinus rhythm  Nonspecific T wave abnormality  Prolonged QT  Abnormal ECG  When compared with ECG of 05-OCT-2022 17:00,  Sinus rhythm has replaced Atrial fibrillation  Questionable change in QRS duration  Intraventricular conduction defect is no longer present  Confirmed by DEMETRIA MURPHY MD ([id number]) on 10/13/2022 9:04:54 AM    Referred By: [email or web address]   SELF           Confirmed By:DEMETRIA MURPHY MD                                  Imaging Results              US Retroperitoneal Complete (Final result)  Result time 10/12/22  23:56:42      Final result by Constantine Barry MD (10/12/22 23:56:42)                   Impression:      1. No hydronephrosis.  2. Bilateral simple renal cysts.    Electronically signed by resident: Juvenal Lara  Date:    10/12/2022  Time:    23:48    Electronically signed by: Constantine Barry MD  Date:    10/12/2022  Time:    23:56               Narrative:    EXAMINATION:  US RETROPERITONEAL COMPLETE    CLINICAL HISTORY:  BRENDAN;    TECHNIQUE:  Ultrasound of the kidneys and urinary bladder was performed including color flow and Doppler evaluation of the kidneys.    COMPARISON:  CT abdomen pelvis 10/05/2022    FINDINGS:  Right kidney: The right kidney measures 10.9 cm. No cortical thinning. No loss of corticomedullary distinction. Resistive index measures 0.76.  No solid mass.  Simple cyst measuring 1.1 x 1.0 x 1.2 cm.  No renal stone. No hydronephrosis.    Left kidney: The left kidney measures 11.1 cm. No cortical thinning. No loss of corticomedullary distinction. Resistive index measures 0.76.  No solid mass.  Simple cyst measuring 1.3 x 1.5 x 1.6 cm.  No renal stone. No hydronephrosis.    The bladder is partially distended at the time of scanning and has an unremarkable appearance.                                       X-Ray Chest AP Portable (Final result)  Result time 10/12/22 19:10:54      Final result by Johnny Gilmore MD (10/12/22 19:10:54)                   Impression:      1. Chronic appearing parenchymal findings suggest pulmonary emphysema.  Interval interstitial edema is a consideration.  Correlation is advised.      Electronically signed by: Johnny Gilmore MD  Date:    10/12/2022  Time:    19:10               Narrative:    EXAMINATION:  XR CHEST AP PORTABLE    CLINICAL HISTORY:  hyperglycemia;    TECHNIQUE:  Single frontal view of the chest was performed.    COMPARISON:  10/05/2022    FINDINGS:  The cardiomediastinal silhouette is not enlarged noting calcification of the aorta..  There is no pleural  effusion.  The trachea is midline.  The lungs are symmetrically expanded bilaterally with multiple scattered regions of bandlike atelectasis/scarring throughout the pulmonary parenchyma, similar to the previous examination..  No large focal consolidation seen.  There is no pneumothorax.  The osseous structures are remarkable for degenerative changes and osteopenia..                                       Medications   sodium chloride 0.9% flush 10 mL (has no administration in time range)   ondansetron injection 4 mg (has no administration in time range)   acetaminophen tablet 650 mg (650 mg Oral Given 10/13/22 0652)   dextrose 10% bolus 125 mL (has no administration in time range)   dextrose 10% bolus 250 mL (has no administration in time range)   gabapentin capsule 100 mg (100 mg Oral Given 10/13/22 2041)   amiodarone tablet 200 mg (200 mg Oral Given 10/13/22 0848)   apixaban tablet 5 mg (5 mg Oral Given 10/13/22 2041)   glucose chewable tablet 16 g (has no administration in time range)   glucose chewable tablet 24 g (has no administration in time range)   glucagon (human recombinant) injection 1 mg (has no administration in time range)   dextrose 10% bolus 125 mL (has no administration in time range)   dextrose 10% bolus 250 mL (has no administration in time range)   insulin detemir U-100 pen 6 Units (6 Units Subcutaneous Given 10/13/22 2041)   insulin aspart U-100 pen 1-10 Units (2 Units Subcutaneous Given 10/13/22 2041)   atorvastatin tablet 40 mg (40 mg Oral Given 10/13/22 0847)   cetirizine tablet 10 mg (10 mg Oral Given 10/13/22 2041)   clopidogreL tablet 75 mg (75 mg Oral Given 10/13/22 0848)   lacosamide tablet 100 mg (100 mg Oral Given 10/13/22 2040)   methocarbamoL tablet 500 mg (500 mg Oral Given 10/13/22 2040)   nitroGLYCERIN SL tablet 0.4 mg (has no administration in time range)   pantoprazole EC tablet 40 mg (40 mg Oral Given 10/13/22 0848)   sertraline tablet 50 mg (50 mg Oral Given 10/13/22 0848)    tamsulosin 24 hr capsule 0.4 mg (0.4 mg Oral Given 10/13/22 2041)   hydrALAZINE tablet 25 mg (has no administration in time range)   insulin aspart U-100 pen 12 Units (12 Units Subcutaneous Given 10/13/22 1815)   sodium chloride 0.9% bolus 1,000 mL (0 mLs Intravenous Stopped 10/12/22 2058)   insulin regular injection 6 Units 0.06 mL (6 Units Intravenous Given 10/12/22 2133)   potassium chloride SA CR tablet 40 mEq (40 mEq Oral Given 10/12/22 2131)     Medical Decision Making:   History:   Old Medical Records: I decided to obtain old medical records.  Old Records Summarized: records from clinic visits and records from previous admission(s).  Differential Diagnosis:   HHNK, DKA, infection, non compliant with medication  Clinical Tests:   Lab Tests: Ordered and Reviewed  Radiological Study: Ordered and Reviewed  Medical Tests: Ordered and Reviewed  ED Management:  Patient is a 77-year-old male with past medical history of insulin-dependent diabetes, stroke, ketosis prone T2DM (poorly controlled with recurrent DKA), CAD, HLD, paroxysmal Afib, and seizures  who presents with hyperglycemia again today. Greater than 500 at [facility] where he [facility]. Today he has an BRENDAN, no signs of dka but maybe HHNK with serum osmolality of 304. Started on iv fluids for hypotension down to 91(one liter slowly bc of concern for chf) and had improvement. urine just returned appearing infected again.   he was just discharged from here yesterday after DKA and septic shock  never found the source of infection as the urine culture was negative on 10/6    Admitted to                         Clinical Impression:   Final diagnoses:  [R73.9] Hyperglycemia  [N17.9] BRENDAN (acute kidney injury) (Primary)        ED Disposition Condition    Observation                 Angella Anderson MD  10/13/22 5033

## 2022-10-12 NOTE — ED NOTES
Patient identifiers for Kamar Muñoz 77 y.o. male checked and correct.  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 Patient is oriented x 4 and speaking appropriately.  APPEARANCE: Patient resting comfortably and in no acute distress. Patient is clean and well groomed, patient's clothing is properly fastened.  HEENT: - JVD, + midline trach, wound to top of head.  SKIN: The skin is warm and dry. Patient has normal skin turgor and moist mucus membranes.   MUSKULOSKELETAL: Patient is moving all extremities well, no obvious deformities noted. Pulses intact. PMS x 4. Pt is weak and unable to stand under own weight.  RESPIRATORY: Airway is open and patent. Respirations are spontaneous and non-labored with normal effort and rate. = CBBS  CARDIAC: No peripheral edema noted. + 2 bilateral pedal and radial pulses, < 3 s cap refill.  ABDOMEN: No distention noted. Soft and non-tender upon palpation.  NEUROLOGICAL: pupils 4 mm, PERRL. Facial expression is symmetrical. Hand  grasps are equal bilaterally. Normal sensation in all extremities when touched with finger.

## 2022-10-12 NOTE — ED TRIAGE NOTES
76 y/o M presents to ER with c/c hyperglycemia. Pt states that he was discharged yesterday, and today tested his CBG with a result > 500 mg/dL. Pt states his last admission was for a UTI, hypotension and hyperglycemia. Pt denies SOB, c/p, N/V/D, abd pain, and dysuria.

## 2022-10-13 PROBLEM — Z87.440 HISTORY OF UTI: Status: ACTIVE | Noted: 2022-10-13

## 2022-10-13 LAB
BACTERIA UR CULT: NO GROWTH
BASOPHILS # BLD AUTO: 0.04 K/UL (ref 0–0.2)
BASOPHILS NFR BLD: 0.5 % (ref 0–1.9)
DIFFERENTIAL METHOD: ABNORMAL
EOSINOPHIL # BLD AUTO: 0.5 K/UL (ref 0–0.5)
EOSINOPHIL NFR BLD: 6.8 % (ref 0–8)
ERYTHROCYTE [DISTWIDTH] IN BLOOD BY AUTOMATED COUNT: 11.3 % (ref 11.5–14.5)
GLUCOSE SERPL-MCNC: 308 MG/DL (ref 70–110)
HCT VFR BLD AUTO: 35.4 % (ref 40–54)
HGB BLD-MCNC: 11.4 G/DL (ref 14–18)
IMM GRANULOCYTES # BLD AUTO: 0.04 K/UL (ref 0–0.04)
IMM GRANULOCYTES NFR BLD AUTO: 0.5 % (ref 0–0.5)
LYMPHOCYTES # BLD AUTO: 1.7 K/UL (ref 1–4.8)
LYMPHOCYTES NFR BLD: 22.3 % (ref 18–48)
MAGNESIUM SERPL-MCNC: 1.6 MG/DL (ref 1.6–2.6)
MCH RBC QN AUTO: 29.5 PG (ref 27–31)
MCHC RBC AUTO-ENTMCNC: 32.2 G/DL (ref 32–36)
MCV RBC AUTO: 92 FL (ref 82–98)
MONOCYTES # BLD AUTO: 0.9 K/UL (ref 0.3–1)
MONOCYTES NFR BLD: 11.9 % (ref 4–15)
NEUTROPHILS # BLD AUTO: 4.3 K/UL (ref 1.8–7.7)
NEUTROPHILS NFR BLD: 58 % (ref 38–73)
NRBC BLD-RTO: 0 /100 WBC
PHOSPHATE SERPL-MCNC: 2.6 MG/DL (ref 2.7–4.5)
PLATELET # BLD AUTO: 225 K/UL (ref 150–450)
PMV BLD AUTO: 9.4 FL (ref 9.2–12.9)
POCT GLUCOSE: 233 MG/DL (ref 70–110)
POCT GLUCOSE: 291 MG/DL (ref 70–110)
POCT GLUCOSE: 308 MG/DL (ref 70–110)
POCT GLUCOSE: 322 MG/DL (ref 70–110)
POCT GLUCOSE: 474 MG/DL (ref 70–110)
RBC # BLD AUTO: 3.86 M/UL (ref 4.6–6.2)
WBC # BLD AUTO: 7.48 K/UL (ref 3.9–12.7)

## 2022-10-13 PROCEDURE — 82962 GLUCOSE BLOOD TEST: CPT

## 2022-10-13 PROCEDURE — 27000221 HC OXYGEN, UP TO 24 HOURS

## 2022-10-13 PROCEDURE — 96372 THER/PROPH/DIAG INJ SC/IM: CPT

## 2022-10-13 PROCEDURE — 83735 ASSAY OF MAGNESIUM: CPT

## 2022-10-13 PROCEDURE — 25000003 PHARM REV CODE 250

## 2022-10-13 PROCEDURE — G0378 HOSPITAL OBSERVATION PER HR: HCPCS

## 2022-10-13 PROCEDURE — 63600175 PHARM REV CODE 636 W HCPCS

## 2022-10-13 PROCEDURE — 85025 COMPLETE CBC W/AUTO DIFF WBC: CPT

## 2022-10-13 PROCEDURE — 63600175 PHARM REV CODE 636 W HCPCS: Mod: GZ

## 2022-10-13 PROCEDURE — 99223 PR INITIAL HOSPITAL CARE,LEVL III: ICD-10-PCS | Mod: AI,GC,, | Performed by: STUDENT IN AN ORGANIZED HEALTH CARE EDUCATION/TRAINING PROGRAM

## 2022-10-13 PROCEDURE — 84100 ASSAY OF PHOSPHORUS: CPT

## 2022-10-13 PROCEDURE — 99223 1ST HOSP IP/OBS HIGH 75: CPT | Mod: AI,GC,, | Performed by: STUDENT IN AN ORGANIZED HEALTH CARE EDUCATION/TRAINING PROGRAM

## 2022-10-13 PROCEDURE — 94761 N-INVAS EAR/PLS OXIMETRY MLT: CPT

## 2022-10-13 RX ORDER — LOSARTAN POTASSIUM 25 MG/1
25 TABLET ORAL DAILY
COMMUNITY

## 2022-10-13 RX ORDER — INSULIN ASPART 100 [IU]/ML
12 INJECTION, SOLUTION INTRAVENOUS; SUBCUTANEOUS
Status: DISCONTINUED | OUTPATIENT
Start: 2022-10-13 | End: 2022-10-14

## 2022-10-13 RX ORDER — HYDRALAZINE HYDROCHLORIDE 25 MG/1
25 TABLET, FILM COATED ORAL EVERY 8 HOURS PRN
Status: DISCONTINUED | OUTPATIENT
Start: 2022-10-13 | End: 2022-10-15 | Stop reason: HOSPADM

## 2022-10-13 RX ADMIN — METHOCARBAMOL 500 MG: 500 TABLET ORAL at 06:10

## 2022-10-13 RX ADMIN — INSULIN DETEMIR 6 UNITS: 100 INJECTION, SOLUTION SUBCUTANEOUS at 08:10

## 2022-10-13 RX ADMIN — INSULIN ASPART 12 UNITS: 100 INJECTION, SOLUTION INTRAVENOUS; SUBCUTANEOUS at 06:10

## 2022-10-13 RX ADMIN — GABAPENTIN 100 MG: 100 CAPSULE ORAL at 08:10

## 2022-10-13 RX ADMIN — AMIODARONE HYDROCHLORIDE 200 MG: 200 TABLET ORAL at 08:10

## 2022-10-13 RX ADMIN — SERTRALINE HYDROCHLORIDE 50 MG: 50 TABLET ORAL at 08:10

## 2022-10-13 RX ADMIN — ACETAMINOPHEN 650 MG: 325 TABLET ORAL at 06:10

## 2022-10-13 RX ADMIN — TAMSULOSIN HYDROCHLORIDE 0.4 MG: 0.4 CAPSULE ORAL at 08:10

## 2022-10-13 RX ADMIN — INSULIN ASPART 10 UNITS: 100 INJECTION, SOLUTION INTRAVENOUS; SUBCUTANEOUS at 01:10

## 2022-10-13 RX ADMIN — CLOPIDOGREL 75 MG: 75 TABLET, FILM COATED ORAL at 08:10

## 2022-10-13 RX ADMIN — INSULIN ASPART 8 UNITS: 100 INJECTION, SOLUTION INTRAVENOUS; SUBCUTANEOUS at 08:10

## 2022-10-13 RX ADMIN — INSULIN ASPART 2 UNITS: 100 INJECTION, SOLUTION INTRAVENOUS; SUBCUTANEOUS at 08:10

## 2022-10-13 RX ADMIN — LACOSAMIDE 100 MG: 50 TABLET, FILM COATED ORAL at 08:10

## 2022-10-13 RX ADMIN — APIXABAN 5 MG: 5 TABLET, FILM COATED ORAL at 08:10

## 2022-10-13 RX ADMIN — CETIRIZINE HYDROCHLORIDE 10 MG: 10 TABLET, FILM COATED ORAL at 08:10

## 2022-10-13 RX ADMIN — ATORVASTATIN CALCIUM 40 MG: 40 TABLET, FILM COATED ORAL at 08:10

## 2022-10-13 RX ADMIN — METHOCARBAMOL 500 MG: 500 TABLET ORAL at 08:10

## 2022-10-13 RX ADMIN — INSULIN ASPART 8 UNITS: 100 INJECTION, SOLUTION INTRAVENOUS; SUBCUTANEOUS at 01:10

## 2022-10-13 RX ADMIN — INSULIN ASPART 4 UNITS: 100 INJECTION, SOLUTION INTRAVENOUS; SUBCUTANEOUS at 06:10

## 2022-10-13 RX ADMIN — PANTOPRAZOLE SODIUM 40 MG: 40 TABLET, DELAYED RELEASE ORAL at 08:10

## 2022-10-13 NOTE — ASSESSMENT & PLAN NOTE
Patient with recent admission with UTI. Ucx showed no predominant organisms. Switched from zosyn to augmentin during previous admission. Patient on this admission with urine microscopy with many WBC, 3+ leukocytes, but few bacteria. No symptoms of UTI on exam.     Plan:  --hold of on antibiotics for now  --urine culture is pending from ED workup  --if fevers or becomes hypotensive consider adding antibiotics

## 2022-10-13 NOTE — ASSESSMENT & PLAN NOTE
Patient presented with blood pressure of 93/54 at ED, likely was volume depleted from glucosuria. Responded well to 1L of IV fluids. Of note patient has required pressors in past admission due to sepsis from UTI and DKA.    -monitor vitals q4h  -hold anti-hypertensives  -fluid boluses cautiously given heart failure hx

## 2022-10-13 NOTE — ASSESSMENT & PLAN NOTE
77 year old M w recent admission for DKA presenting with hyperglycemia likely HHS causing volume depletion and acute kidney injruy. Patient responded well with 6 units of regular insulin in the ED going from sugar of 490s down to 290. Will resume his subq insulin without having to do insulin gtt given improvement.    Patient's FSGs are uncontrolled due to hyperglycemia on current medication regimen.  Last A1c reviewed-   Lab Results   Component Value Date    HGBA1C 10.6 (H) 10/05/2022     Most recent fingerstick glucose reviewed-   Recent Labs   Lab 10/12/22  1649 10/12/22  2233   POCTGLUCOSE >500* 291*     Current correctional scale  Medium  Maintain anti-hyperglycemic dose as follows-   Antihyperglycemics (From admission, onward)    Start     Stop Route Frequency Ordered    10/13/22 0900  insulin detemir U-100 pen 6 Units         -- SubQ 2 times daily 10/12/22 2244    10/13/22 0715  insulin aspart U-100 pen 8 Units         -- SubQ 3 times daily with meals 10/12/22 2244    10/12/22 2343  insulin aspart U-100 pen 1-10 Units         -- SubQ Before meals & nightly PRN 10/12/22 2244        Hold Oral hypoglycemics while patient is in the hospital.  POCT glucose checks with meals  Consider re consulting endocrine given they were following on his previous admission  Patient education, uncertain why this patients sugars became so elevated given he note that he did not eat anything the entire day. Denies having to use his meal time insulin.  Hypoglycemia protocol  If sugars spike and are out of control will put patient on insulin gtt   Gabapentin 100mg BID for neuropathy, can consider stopping if worsening kidney function

## 2022-10-13 NOTE — HOSPITAL COURSE
Patient admitted for elevated glucose of > 500, found to have BRENDAN. Given fluids and started on insulin regimen. Gradual improvement in BGL with adjustments in insulin regimen: currently on Detemir 6u BID, aspart 15 TIDWM, MDSSI. Patient set up for outpatient endocrinology and PCP follow up. Home BB held due to low HR.

## 2022-10-13 NOTE — ASSESSMENT & PLAN NOTE
Patient with Paroxysmal (<7 days) atrial fibrillation which is controlled currently with Amiodarone. Patient is currently in sinus rhythm.[id number] Score: 4.  Anticoagulation indicated. Anticoagulation done with eliquis.

## 2022-10-13 NOTE — H&P
[facility] - Emergency Dept  [facility] Medicine  History & Physical    Patient Name: Kamar Muñoz  MRN: [id number]  Patient Class: OP- Observation  Admission Date: 10/12/2022  Attending Physician: Zana Escamilla MD   Primary Care Provider: Basim Guerrero MD         Patient information was obtained from patient, past medical records and ER records.     Subjective:     Principal Problem:Type 2 diabetes mellitus with hyperglycemia, with long-term current use of insulin    Chief Complaint:   Chief Complaint   Patient presents with    Hyperglycemia     Arrives via EMS with c/o hyperglycemia, CBG reading >500, pt is coming from [facility] was just discharged from this facility was same thing         HPI: 77 year-old M w hx of T2DM poorly controlled on insulin, paroxysmal atrial fib, seizures, HLD, diabetic peripheral neuropathy, recent ICU admission for DKA, UTI was discharged home on 10/12 presenting same day after doing fingerstick glucose check with high sugars. Patient notes that he did not eat anything all day after being discharged and did not get a chance to take any of his medications. Patient is presenting from [facility] in generally same state of health as when he was discharged. Patient notes that his wife recently passed away, she was also living at [facility]. Per last admission note, there was concern that patient was not taking care of himself, possible decreased PO intake. He notes that he does have neuropathy from diabetes for which he takes gabapentin. He is hungry and thirsty from not eating today. Patient also complains of left sided muscular back pain that is worse/aggravated with movement.    Of note, on patients previous admission he did require ICU level care with pressor requirements while he was treated for DKA and UTI.    ED course:  Patient presents to ED found to have glucose greater than 500 on POCT. Found to have BRENDAN with creatinine of 1.6 up from previous  baseline of 0.8. Given 1L of fluids and 6 units of insulin. Patients glucose improved down to 290 with just 6 units of insulin. Lactic acid of 2.2. U/A with increase WBC count but rare bacteria. Patient is afebrile, however initially presented with hypotension with systolic of 93/54.  Patient admitted to hospital medicine for hyperglycemia and Acute kidney injury.      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    Hyperlipidemia     Hypertension     Kidney stone     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 08/02/2012    STEMI involving right coronary artery 01/09/2022    Type II or unspecified type diabetes mellitus with neurological manifestations, uncontrolled(250.62) 03/08/2013    Urinary tract infection        Past Surgical History:   Procedure Laterality Date    BACK SURGERY      CATARACT EXTRACTION W/  INTRAOCULAR LENS IMPLANT Right     Per Dr Romero note 11/2018    COLONOSCOPY N/A 1/28/2019    Procedure: COLONOSCOPY Suprep;  Surgeon: Anh Johnson MD;  Location: [facility];  Service: Endoscopy;  Laterality: N/A;    ESOPHAGOGASTRODUODENOSCOPY N/A 9/15/2022    Procedure: EGD (ESOPHAGOGASTRODUODENOSCOPY);  Surgeon: Dashawn Evans MD;  Location: [facility];  Service: Endoscopy;  Laterality: N/A;    EYE SURGERY      HERNIA REPAIR      LEFT HEART CATHETERIZATION Left 1/9/2022    Procedure: CATHETERIZATION, HEART, LEFT;  Surgeon: Will Hurst III, MD;  Location: [facility] CATH LAB/EP;  Service: Cardiology;  Laterality: Left;    renal stones      SHOULDER OPEN ROTATOR CUFF REPAIR         Review of patient's allergies indicates:   Allergen Reactions    Iodine      Other reaction(s): swelling  Other reaction(s): Itching  Other reaction(s): Rash       No current facility-administered medications on file  "prior to encounter.     Current Outpatient Medications on File Prior to Encounter   Medication Sig    amiodarone (PACERONE) 200 MG Tab Take 1 tablet (200 mg total) by mouth once daily.    apixaban (ELIQUIS) 5 mg Tab Take 1 tablet (5 mg total) by mouth 2 (two) times daily.    atorvastatin (LIPITOR) 40 MG tablet Take 1 tablet (40 mg total) by mouth once daily.    BD ULTRA-FINE SHORT PEN NEEDLE 31 gauge x 5/16" Ndle 3 (three) times daily.    blood sugar diagnostic Strp 1 strip by Misc.(Non-Drug; Combo Route) route 2 (two) times a day.    cetirizine (ZYRTEC) 10 MG tablet Take 1 tablet (10 mg total) by mouth every evening.    clopidogreL (PLAVIX) 75 mg tablet Take 75 mg by mouth once daily.    diclofenac sodium (VOLTAREN) 1 % Gel Apply 4 g topically 3 (three) times daily. Apply to sacrun closed skin/buttocks    docusate sodium (COLACE) 100 MG capsule Take 100 mg by mouth once daily at 6am.    ergocalciferol (ERGOCALCIFEROL) 50,000 unit Cap Take 1 capsule (50,000 Units total) by mouth every 7 days.    gabapentin (NEURONTIN) 100 MG capsule Take 1 capsule (100 mg total) by mouth 2 (two) times daily.    glucose 4 GM chewable tablet Take 4 tablets (16 g total) by mouth as needed for Low blood sugar (50 - 70).    glucose 4 GM chewable tablet Take 6 tablets (24 g total) by mouth as needed for Low blood sugar (< 50).    insulin aspart U-100 (NOVOLOG) 100 unit/mL (3 mL) InPn pen Inject 4 Units into the skin with snacks (>200).    insulin aspart U-100 (NOVOLOG) 100 unit/mL (3 mL) InPn pen Inject 8-16 Units into the skin 3 (three) times daily.    insulin detemir U-100 (LEVEMIR FLEXTOUCH) 100 unit/mL (3 mL) SubQ InPn pen Inject 6 Units into the skin 2 (two) times daily.    lacosamide (VIMPAT) 100 mg Tab Take 1 tablet (100 mg total) by mouth every 12 (twelve) hours.    lancets (ONETOUCH ULTRASOFT LANCETS) Misc 1 lancet by Misc.(Non-Drug; Combo Route) route 2 (two) times a day.    losartan (COZAAR) 50 MG tablet Take " "0.5 tablets (25 mg total) by mouth once daily.    magnesium oxide (MAG-OX) 400 mg (241.3 mg magnesium) tablet Take 1 tablet (400 mg total) by mouth 2 (two) times daily.    methocarbamoL (ROBAXIN) 500 MG Tab Take 1 tablet (500 mg total) by mouth 4 (four) times daily. for 10 days    metoprolol succinate (TOPROL-XL) 25 MG 24 hr tablet Take 1 tablet (25 mg total) by mouth once daily.    MULTIVITAMIN ORAL Take 1 tablet by mouth once daily.     nitroGLYCERIN (NITROSTAT) 0.4 MG SL tablet Place 1 tablet (0.4 mg total) under the tongue every 5 (five) minutes as needed for Chest pain.    ondansetron (ZOFRAN-ODT) 4 MG TbDL Take 4 mg by mouth 3 (three) times daily with meals.    pantoprazole (PROTONIX) 40 MG tablet Take 1 tablet (40 mg total) by mouth once daily.    pen needle, diabetic (PEN NEEDLE) 30 gauge x 5/16" Ndle 1 Units by Misc.(Non-Drug; Combo Route) route 3 (three) times daily.    polycarbophil (FIBERCON) 625 mg tablet Take 1 tablet by mouth once daily.     psyllium husk, with sugar, (METAMUCIL, WITH SUGAR,) 3.4 gram packet Take 1 packet by mouth 2 (two) times daily.    sertraline (ZOLOFT) 50 MG tablet Take 50 mg by mouth once daily.    sucralfate (CARAFATE) 1 gram tablet Take 1 g by mouth 3 (three) times daily before meals.    tamsulosin (FLOMAX) 0.4 mg Cap Take 1 capsule (0.4 mg total) by mouth every evening.    [DISCONTINUED] albuterol (PROVENTIL/VENTOLIN HFA) 90 mcg/actuation inhaler Inhale 2 puffs into the lungs every 4 (four) hours as needed for Wheezing (Pt states he will take it on his own. MDI placed by bedside.). Rescue    [DISCONTINUED] aspirin (ECOTRIN) 81 MG EC tablet Take 81 mg by mouth once daily.    [DISCONTINUED] blood-glucose meter,continuous (DEXCOM G5 ) Misc 1 Device by Misc.(Non-Drug; Combo Route) route once daily at 6am.    [DISCONTINUED] blood-glucose transmitter (DEXCOM G5 TRANSMITTER) Stephanie 1 Device by Misc.(Non-Drug; Combo Route) route once daily at 6am.    " [DISCONTINUED] diltiaZEM (CARDIZEM CD) 120 MG Cp24 Take 1 capsule (120 mg total) by mouth once daily.    [DISCONTINUED] metFORMIN (GLUCOPHAGE) 1000 MG tablet Take 1 tablet (1,000 mg total) by mouth 2 (two) times daily with meals.     Family History       Problem Relation (Age of Onset)    Diabetes Father          Tobacco Use    Smoking status: Former     Packs/day: 1.50     Years: 25.00     Pack years: 37.50     Types: Cigarettes     Quit date: 1983     Years since quittin.8    Smokeless tobacco: Never   Substance and Sexual Activity    Alcohol use: No    Drug use: No    Sexual activity: Yes     Partners: Female     Review of Systems   Constitutional:  Positive for fatigue. Negative for fever.   HENT:  Negative for sore throat and trouble swallowing.    Eyes:  Negative for pain and visual disturbance.   Respiratory:  Negative for shortness of breath.    Cardiovascular:  Negative for chest pain and leg swelling.   Gastrointestinal:  Negative for abdominal pain, diarrhea, nausea and vomiting.   Endocrine: Negative for polydipsia, polyphagia and polyuria.   Genitourinary:  Negative for decreased urine volume, difficulty urinating and hematuria.   Neurological:  Positive for syncope (not today, but previously at nursing home). Negative for dizziness and facial asymmetry.   Hematological:  Bruises/bleeds easily.   Psychiatric/Behavioral:  Negative for agitation, behavioral problems and confusion.    Objective:     Vital Signs (Most Recent):  Temp: 98.2 °F (36.8 °C) (10/12/22 1634)  Pulse: 61 (10/12/22 2230)  Resp: 16 (10/12/22 2230)  BP: (!) 141/67 (10/12/22 2230)  SpO2: 95 % (10/12/22 2230)   Vital Signs (24h Range):  Temp:  [97.6 °F (36.4 °C)-98.2 °F (36.8 °C)] 98.2 °F (36.8 °C)  Pulse:  [61-70] 61  Resp:  [14-16] 16  SpO2:  [95 %-98 %] 95 %  BP: ()/(54-79) 141/67     Weight: 74.6 kg (164 lb 7.4 oz)  Body mass index is 21.12 kg/m².    Physical Exam  Constitutional:       General: He is not in acute  distress.     Appearance: He is normal weight. He is not ill-appearing or diaphoretic.   HENT:      Head: Normocephalic and atraumatic.      Right Ear: External ear normal.      Left Ear: External ear normal.      Nose: Nose normal. No congestion.      Mouth/Throat:      Mouth: Mucous membranes are dry.   Eyes:      General:         Right eye: No discharge.         Left eye: No discharge.      Conjunctiva/sclera: Conjunctivae normal.   Cardiovascular:      Rate and Rhythm: Normal rate.      Pulses: Normal pulses.      Heart sounds: Normal heart sounds.   Pulmonary:      Effort: Pulmonary effort is normal. No respiratory distress.      Breath sounds: Normal breath sounds. No stridor. No wheezing.   Chest:      Chest wall: No tenderness.   Abdominal:      General: Abdomen is flat. There is no distension.      Palpations: Abdomen is soft.      Tenderness: There is no abdominal tenderness. There is no rebound.   Musculoskeletal:      Right lower leg: No edema.      Left lower leg: No edema.   Skin:     General: Skin is warm and dry.      Coloration: Skin is not jaundiced.      Findings: No rash.   Neurological:      Mental Status: He is alert and oriented to person, place, and time. Mental status is at baseline.   Psychiatric:         Mood and Affect: Mood normal.         Behavior: Behavior normal.           Significant Labs: All pertinent labs within the past 24 hours have been reviewed.  A1C:   Recent Labs   Lab 10/05/22  1724   HGBA1C 10.6*     ABGs:   Recent Labs   Lab 10/12/22  1734   PH 7.312*   PCO2 52.5*   HCO3 26.6   POCSATURATED 54*   BE 0   PO2 32*     Blood Culture: No results for input(s): LABBLOO in the last 48 hours.  CBC:   Recent Labs   Lab 10/11/22  0546 10/12/22  1716   WBC 7.76 7.70   HGB 12.4* 12.8*   HCT 37.2* 39.3*    205     CMP:   Recent Labs   Lab 10/11/22  0546 10/12/22  1716    134*   K 3.7 4.2    103   CO2 26 22*    497*   BUN 11 23   CREATININE 0.8 1.6*   CALCIUM  8.4* 8.5*   PROT 6.0 6.3   ALBUMIN 2.4* 2.5*   BILITOT 0.4 0.3   ALKPHOS 134 139*   AST 24 44*   ALT 17 24   ANIONGAP 8 9     Lactic Acid:   Recent Labs   Lab 10/12/22  1716 10/12/22  2013   LACTATE 3.2* 2.2     POCT Glucose:   Recent Labs   Lab 10/11/22  1107 10/12/22  1649 10/12/22  2233   POCTGLUCOSE 272* >500* 291*     Urine Culture: No results for input(s): LABURIN in the last 48 hours.  Urine Studies:   Recent Labs   Lab 10/12/22  2058   COLORU Yellow   APPEARANCEUA Clear   PHUR 5.0   SPECGRAV 1.030   PROTEINUA Negative   GLUCUA 4+*   KETONESU Negative   BILIRUBINUA Negative   OCCULTUA Negative   NITRITE Negative   LEUKOCYTESUR 3+*   RBCUA 4   WBCUA 97*   BACTERIA Rare       Significant Imaging: CXR: I have reviewed all pertinent results/findings within the past 24 hours and my personal findings are:  interval interstitial edema , no costophrenic blunting, trachea midline, no focal consolidation.    Assessment/Plan:     * Type 2 diabetes mellitus with hyperglycemia, with long-term current use of insulin  77 year old M w recent admission for DKA presenting with hyperglycemia likely HHS causing volume depletion and acute kidney injruy. Patient responded well with 6 units of regular insulin in the ED going from sugar of 490s down to 290. Will resume his subq insulin without having to do insulin gtt given improvement.    Patient's FSGs are uncontrolled due to hyperglycemia on current medication regimen.  Last A1c reviewed-   Lab Results   Component Value Date    HGBA1C 10.6 (H) 10/05/2022     Most recent fingerstick glucose reviewed-   Recent Labs   Lab 10/12/22  1649 10/12/22  2233   POCTGLUCOSE >500* 291*     Current correctional scale  Medium  Maintain anti-hyperglycemic dose as follows-   Antihyperglycemics (From admission, onward)    Start     Stop Route Frequency Ordered    10/13/22 0900  insulin detemir U-100 pen 6 Units         -- SubQ 2 times daily 10/12/22 2244    10/13/22 0715  insulin aspart U-100 pen  8 Units         -- SubQ 3 times daily with meals 10/12/22 2244    10/12/22 2343  insulin aspart U-100 pen 1-10 Units         -- SubQ Before meals & nightly PRN 10/12/22 2244        Hold Oral hypoglycemics while patient is in the hospital.  POCT glucose checks with meals  Consider re consulting endocrine given they were following on his previous admission  Patient education, uncertain why this patients sugars became so elevated given he note that he did not eat anything the entire day. Denies having to use his meal time insulin.  Hypoglycemia protocol  If sugars spike and are out of control will put patient on insulin gtt   Gabapentin 100mg BID for neuropathy, can consider stopping if worsening kidney function    History of UTI  Patient with recent admission with UTI. Ucx showed no predominant organisms. Switched from zosyn to augmentin during previous admission. Patient on this admission with urine microscopy with many WBC, 3+ leukocytes, but few bacteria. No symptoms of UTI on exam.     Plan:  --hold of on antibiotics for now  --urine culture is pending from ED workup  --if fevers or becomes hypotensive consider adding antibiotics    Hypotension due to hypovolemia  Patient presented with blood pressure of 93/54 at ED, likely was volume depleted from glucosuria. Responded well to 1L of IV fluids. Of note patient has required pressors in past admission due to sepsis from UTI and DKA.    -monitor vitals q4h  -hold anti-hypertensives  -fluid boluses cautiously given heart failure hx      Goals of care, counseling/discussion  Patient would like to remain DNR status after explaining it to him. He wants to keep it the same.    Chronic anticoagulation  See atrial fib      History of partial seizures  history of focal seizures  -Continue home Lacosamide      Stented coronary artery  -Hx of CAD s/p placement of 2 LAUREN in RCA in 01/09/2022.   -Patient denied chest pain on admission, troponin WNL  -EKG on admission(10/05) Sinus  "rhythm with 1st degree A-V block  -Per chart review, "ASA discontinued 4/13 due to bleeding risk with triple therapy; continuing Plavix and Eliquis" "No need for triple therapy (ASA/Plavix/eliquis) beyond 1 month"     PLAN  -Continue home statin  -Continue home apixaban, clopidogrel   -Metoprolol and Losartan held due to hypotension. Will resume with clinically appropriate    Paroxysmal atrial fibrillation  Patient with Paroxysmal (<7 days) atrial fibrillation which is controlled currently with Amiodarone. Patient is currently in sinus rhythm.[id number] Score: 4.  Anticoagulation indicated. Anticoagulation done with eliquis.        Coronary artery disease involving native coronary artery of native heart with unstable angina pectoris  Continue plavix      BRENDAN (acute kidney injury)  Patient with acute kidney injury likely due to IVVD/dehydration BRENDAN is currently to be determined on next CMP. Labs reviewed- Renal function/electrolytes with Estimated Creatinine Clearance: 39.5 mL/min (A) (based on SCr of 1.6 mg/dL (H)). according to latest data. Monitor urine output and serial BMP and adjust therapy as needed. Avoid nephrotoxins and renally dose meds for GFR listed above. Recent creatinine level prior to today was 0.8.    DDX: prerenal from volume depletion 2/2 glucosuria, obstruction, ischemic ATN from hypotension    Plan:  --avoid nephrotoxic agents  --avoid NSAIDs  --renally dose medications  --follow up renal studies, calculate FeUREA  --follow up retroperitoneal ultrasound  --1L of fluids given in ED  --encourage PO intake      Essential hypertension  Will hold anti-hypertensive medication at this time given patient hypotensive on presentation to 93/54. Patient on losartan, will need to also hold for BRENDAN.    -- hold losartan 25mg        VTE Risk Mitigation (From admission, onward)         Ordered     apixaban tablet 5 mg  2 times daily         10/12/22 2244     IP VTE HIGH RISK PATIENT  Once         10/12/22 2226 "     Place sequential compression device  Until discontinued         10/12/22 2871                   Volodymyr Esparza MD  Department of Hospital Medicine   [facility] - Emergency Dept

## 2022-10-13 NOTE — ASSESSMENT & PLAN NOTE
Patient with acute kidney injury likely due to IVVD/dehydration BRENDAN is currently to be determined on next CMP. Labs reviewed- Renal function/electrolytes with Estimated Creatinine Clearance: 39.5 mL/min (A) (based on SCr of 1.6 mg/dL (H)). according to latest data. Monitor urine output and serial BMP and adjust therapy as needed. Avoid nephrotoxins and renally dose meds for GFR listed above. Recent creatinine level prior to today was 0.8.    DDX: prerenal from volume depletion 2/2 glucosuria, obstruction, ischemic ATN from hypotension    Plan:  --avoid nephrotoxic agents  --avoid NSAIDs  --renally dose medications  --follow up renal studies, calculate FeUREA  --follow up retroperitoneal ultrasound  --1L of fluids given in ED  --encourage PO intake

## 2022-10-13 NOTE — ED NOTES
Pt reports needing assistive equipment and person to ambulate. Pt reporting fecal and urinary incontinence. Pt frustrated and declines to participate in medication reconciliation. Glasses, dentures, and hearing aids with nursing home. Pt with decreased hearing and reports decreased vision.

## 2022-10-13 NOTE — SUBJECTIVE & OBJECTIVE
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    Hyperlipidemia     Hypertension     Kidney stone     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 08/02/2012    STEMI involving right coronary artery 01/09/2022    Type II or unspecified type diabetes mellitus with neurological manifestations, uncontrolled(250.62) 03/08/2013    Urinary tract infection        Past Surgical History:   Procedure Laterality Date    BACK SURGERY      CATARACT EXTRACTION W/  INTRAOCULAR LENS IMPLANT Right     Per Dr Romero note 11/2018    COLONOSCOPY N/A 1/28/2019    Procedure: COLONOSCOPY Suprep;  Surgeon: Anh Johnson MD;  Location: [facility] ENDO;  Service: Endoscopy;  Laterality: N/A;    ESOPHAGOGASTRODUODENOSCOPY N/A 9/15/2022    Procedure: EGD (ESOPHAGOGASTRODUODENOSCOPY);  Surgeon: Dashawn Evans MD;  Location: [facility] ENDO;  Service: Endoscopy;  Laterality: N/A;    EYE SURGERY      HERNIA REPAIR      LEFT HEART CATHETERIZATION Left 1/9/2022    Procedure: CATHETERIZATION, HEART, LEFT;  Surgeon: Will Hurst III, MD;  Location: [facility] CATH LAB/EP;  Service: Cardiology;  Laterality: Left;    renal stones      SHOULDER OPEN ROTATOR CUFF REPAIR         Review of patient's allergies indicates:   Allergen Reactions    Iodine      Other reaction(s): swelling  Other reaction(s): Itching  Other reaction(s): Rash       No current facility-administered medications on file prior to encounter.     Current Outpatient Medications on File Prior to Encounter   Medication Sig    amiodarone (PACERONE) 200 MG Tab Take 1 tablet (200 mg total) by mouth once daily.    apixaban (ELIQUIS) 5 mg Tab Take 1 tablet (5 mg total) by mouth 2 (two) times daily.    atorvastatin (LIPITOR) 40 MG tablet Take 1 tablet (40 mg total) by mouth once daily.    BD ULTRA-FINE SHORT PEN NEEDLE 31  "gauge x 5/16" Ndle 3 (three) times daily.    blood sugar diagnostic Strp 1 strip by Misc.(Non-Drug; Combo Route) route 2 (two) times a day.    cetirizine (ZYRTEC) 10 MG tablet Take 1 tablet (10 mg total) by mouth every evening.    clopidogreL (PLAVIX) 75 mg tablet Take 75 mg by mouth once daily.    diclofenac sodium (VOLTAREN) 1 % Gel Apply 4 g topically 3 (three) times daily. Apply to sacrun closed skin/buttocks    docusate sodium (COLACE) 100 MG capsule Take 100 mg by mouth once daily at 6am.    ergocalciferol (ERGOCALCIFEROL) 50,000 unit Cap Take 1 capsule (50,000 Units total) by mouth every 7 days.    gabapentin (NEURONTIN) 100 MG capsule Take 1 capsule (100 mg total) by mouth 2 (two) times daily.    glucose 4 GM chewable tablet Take 4 tablets (16 g total) by mouth as needed for Low blood sugar (50 - 70).    glucose 4 GM chewable tablet Take 6 tablets (24 g total) by mouth as needed for Low blood sugar (< 50).    insulin aspart U-100 (NOVOLOG) 100 unit/mL (3 mL) InPn pen Inject 4 Units into the skin with snacks (>200).    insulin aspart U-100 (NOVOLOG) 100 unit/mL (3 mL) InPn pen Inject 8-16 Units into the skin 3 (three) times daily.    insulin detemir U-100 (LEVEMIR FLEXTOUCH) 100 unit/mL (3 mL) SubQ InPn pen Inject 6 Units into the skin 2 (two) times daily.    lacosamide (VIMPAT) 100 mg Tab Take 1 tablet (100 mg total) by mouth every 12 (twelve) hours.    lancets (ONETOUCH ULTRASOFT LANCETS) Misc 1 lancet by Misc.(Non-Drug; Combo Route) route 2 (two) times a day.    losartan (COZAAR) 50 MG tablet Take 0.5 tablets (25 mg total) by mouth once daily.    magnesium oxide (MAG-OX) 400 mg (241.3 mg magnesium) tablet Take 1 tablet (400 mg total) by mouth 2 (two) times daily.    methocarbamoL (ROBAXIN) 500 MG Tab Take 1 tablet (500 mg total) by mouth 4 (four) times daily. for 10 days    metoprolol succinate (TOPROL-XL) 25 MG 24 hr tablet Take 1 tablet (25 mg total) by mouth once daily.    MULTIVITAMIN ORAL Take 1 " "tablet by mouth once daily.     nitroGLYCERIN (NITROSTAT) 0.4 MG SL tablet Place 1 tablet (0.4 mg total) under the tongue every 5 (five) minutes as needed for Chest pain.    ondansetron (ZOFRAN-ODT) 4 MG TbDL Take 4 mg by mouth 3 (three) times daily with meals.    pantoprazole (PROTONIX) 40 MG tablet Take 1 tablet (40 mg total) by mouth once daily.    pen needle, diabetic (PEN NEEDLE) 30 gauge x 5/16" Ndle 1 Units by Misc.(Non-Drug; Combo Route) route 3 (three) times daily.    polycarbophil (FIBERCON) 625 mg tablet Take 1 tablet by mouth once daily.     psyllium husk, with sugar, (METAMUCIL, WITH SUGAR,) 3.4 gram packet Take 1 packet by mouth 2 (two) times daily.    sertraline (ZOLOFT) 50 MG tablet Take 50 mg by mouth once daily.    sucralfate (CARAFATE) 1 gram tablet Take 1 g by mouth 3 (three) times daily before meals.    tamsulosin (FLOMAX) 0.4 mg Cap Take 1 capsule (0.4 mg total) by mouth every evening.    [DISCONTINUED] albuterol (PROVENTIL/VENTOLIN HFA) 90 mcg/actuation inhaler Inhale 2 puffs into the lungs every 4 (four) hours as needed for Wheezing (Pt states he will take it on his own. MDI placed by bedside.). Rescue    [DISCONTINUED] aspirin (ECOTRIN) 81 MG EC tablet Take 81 mg by mouth once daily.    [DISCONTINUED] blood-glucose meter,continuous (DEXCOM G5 ) Misc 1 Device by Misc.(Non-Drug; Combo Route) route once daily at 6am.    [DISCONTINUED] blood-glucose transmitter (DEXCOM G5 TRANSMITTER) Stephanie 1 Device by Misc.(Non-Drug; Combo Route) route once daily at 6am.    [DISCONTINUED] diltiaZEM (CARDIZEM CD) 120 MG Cp24 Take 1 capsule (120 mg total) by mouth once daily.    [DISCONTINUED] metFORMIN (GLUCOPHAGE) 1000 MG tablet Take 1 tablet (1,000 mg total) by mouth 2 (two) times daily with meals.     Family History       Problem Relation (Age of Onset)    Diabetes Father          Tobacco Use    Smoking status: Former     Packs/day: 1.50     Years: 25.00     Pack years: 37.50     Types: Cigarettes     " Quit date: 1983     Years since quittin.8    Smokeless tobacco: Never   Substance and Sexual Activity    Alcohol use: No    Drug use: No    Sexual activity: Yes     Partners: Female     Review of Systems   Constitutional:  Positive for fatigue. Negative for fever.   HENT:  Negative for sore throat and trouble swallowing.    Eyes:  Negative for pain and visual disturbance.   Respiratory:  Negative for shortness of breath.    Cardiovascular:  Negative for chest pain and leg swelling.   Gastrointestinal:  Negative for abdominal pain, diarrhea, nausea and vomiting.   Endocrine: Negative for polydipsia, polyphagia and polyuria.   Genitourinary:  Negative for decreased urine volume, difficulty urinating and hematuria.   Neurological:  Positive for syncope (not today, but previously at nursing home). Negative for dizziness and facial asymmetry.   Hematological:  Bruises/bleeds easily.   Psychiatric/Behavioral:  Negative for agitation, behavioral problems and confusion.    Objective:     Vital Signs (Most Recent):  Temp: 98.2 °F (36.8 °C) (10/12/22 1634)  Pulse: 61 (10/12/22 2230)  Resp: 16 (10/12/22 2230)  BP: (!) 141/67 (10/12/22 2230)  SpO2: 95 % (10/12/22 2230)   Vital Signs (24h Range):  Temp:  [97.6 °F (36.4 °C)-98.2 °F (36.8 °C)] 98.2 °F (36.8 °C)  Pulse:  [61-70] 61  Resp:  [14-16] 16  SpO2:  [95 %-98 %] 95 %  BP: ()/(54-79) 141/67     Weight: 74.6 kg (164 lb 7.4 oz)  Body mass index is 21.12 kg/m².    Physical Exam  Constitutional:       General: He is not in acute distress.     Appearance: He is normal weight. He is not ill-appearing or diaphoretic.   HENT:      Head: Normocephalic and atraumatic.      Right Ear: External ear normal.      Left Ear: External ear normal.      Nose: Nose normal. No congestion.      Mouth/Throat:      Mouth: Mucous membranes are dry.   Eyes:      General:         Right eye: No discharge.         Left eye: No discharge.      Conjunctiva/sclera: Conjunctivae normal.    Cardiovascular:      Rate and Rhythm: Normal rate.      Pulses: Normal pulses.      Heart sounds: Normal heart sounds.   Pulmonary:      Effort: Pulmonary effort is normal. No respiratory distress.      Breath sounds: Normal breath sounds. No stridor. No wheezing.   Chest:      Chest wall: No tenderness.   Abdominal:      General: Abdomen is flat. There is no distension.      Palpations: Abdomen is soft.      Tenderness: There is no abdominal tenderness. There is no rebound.   Musculoskeletal:      Right lower leg: No edema.      Left lower leg: No edema.   Skin:     General: Skin is warm and dry.      Coloration: Skin is not jaundiced.      Findings: No rash.   Neurological:      Mental Status: He is alert and oriented to person, place, and time. Mental status is at baseline.   Psychiatric:         Mood and Affect: Mood normal.         Behavior: Behavior normal.           Significant Labs: All pertinent labs within the past 24 hours have been reviewed.  A1C:   Recent Labs   Lab 10/05/22  1724   HGBA1C 10.6*     ABGs:   Recent Labs   Lab 10/12/22  1734   PH 7.312*   PCO2 52.5*   HCO3 26.6   POCSATURATED 54*   BE 0   PO2 32*     Blood Culture: No results for input(s): LABBLOO in the last 48 hours.  CBC:   Recent Labs   Lab 10/11/22  0546 10/12/22  1716   WBC 7.76 7.70   HGB 12.4* 12.8*   HCT 37.2* 39.3*    205     CMP:   Recent Labs   Lab 10/11/22  0546 10/12/22  1716    134*   K 3.7 4.2    103   CO2 26 22*    497*   BUN 11 23   CREATININE 0.8 1.6*   CALCIUM 8.4* 8.5*   PROT 6.0 6.3   ALBUMIN 2.4* 2.5*   BILITOT 0.4 0.3   ALKPHOS 134 139*   AST 24 44*   ALT 17 24   ANIONGAP 8 9     Lactic Acid:   Recent Labs   Lab 10/12/22  1716 10/12/22  2013   LACTATE 3.2* 2.2     POCT Glucose:   Recent Labs   Lab 10/11/22  1107 10/12/22  1649 10/12/22  2233   POCTGLUCOSE 272* >500* 291*     Urine Culture: No results for input(s): LABURIN in the last 48 hours.  Urine Studies:   Recent Labs   Lab  10/12/22  2058   COLORU Yellow   APPEARANCEUA Clear   PHUR 5.0   SPECGRAV 1.030   PROTEINUA Negative   GLUCUA 4+*   KETONESU Negative   BILIRUBINUA Negative   OCCULTUA Negative   NITRITE Negative   LEUKOCYTESUR 3+*   RBCUA 4   WBCUA 97*   BACTERIA Rare       Significant Imaging: CXR: I have reviewed all pertinent results/findings within the past 24 hours and my personal findings are:  interval interstitial edema , no costophrenic blunting, trachea midline, no focal consolidation.

## 2022-10-13 NOTE — ASSESSMENT & PLAN NOTE
Patient's FSGs are uncontrolled due to hyperglycemia on current medication regimen.  Last A1c reviewed-   Lab Results   Component Value Date    HGBA1C 10.6 (H) 10/05/2022     Most recent fingerstick glucose reviewed-   Recent Labs   Lab 10/12/22  1649 10/12/22  2233   POCTGLUCOSE >500* 291*     Current correctional scale  Medium  Maintain anti-hyperglycemic dose as follows-   Antihyperglycemics (From admission, onward)    Start     Stop Route Frequency Ordered    10/13/22 0900  insulin detemir U-100 pen 6 Units         -- SubQ 2 times daily 10/12/22 2244    10/13/22 0715  insulin aspart U-100 pen 8 Units         -- SubQ 3 times daily with meals 10/12/22 2244    10/12/22 2343  insulin aspart U-100 pen 1-10 Units         -- SubQ Before meals & nightly PRN 10/12/22 2244        Hold Oral hypoglycemics while patient is in the hospital.

## 2022-10-13 NOTE — ASSESSMENT & PLAN NOTE
"-Hx of CAD s/p placement of 2 LAUREN in RCA in 01/09/2022.   -Patient denied chest pain on admission, troponin WNL  -EKG on admission(10/05) Sinus rhythm with 1st degree A-V block  -Per chart review, "ASA discontinued 4/13 due to bleeding risk with triple therapy; continuing Plavix and Eliquis" "No need for triple therapy (ASA/Plavix/eliquis) beyond 1 month"     PLAN  -Continue home statin  -Continue home apixaban, clopidogrel   -Metoprolol and Losartan held due to hypotension. Will resume with clinically appropriate  "

## 2022-10-13 NOTE — HPI
77 year-old M w hx of T2DM poorly controlled on insulin, paroxysmal atrial fib, seizures, HLD, diabetic peripheral neuropathy, recent ICU admission for DKA, UTI was discharged home on 10/12 presenting same day after doing fingerstick glucose check with high sugars. Patient notes that he did not eat anything all day after being discharged and did not get a chance to take any of his medications. Patient is presenting from [facility] in generally same state of health as when he was discharged. Patient notes that his wife recently passed away, she was also living at [facility]. Per last admission note, there was concern that patient was not taking care of himself, possible decreased PO intake. He notes that he does have neuropathy from diabetes for which he takes gabapentin. He is hungry and thirsty from not eating today. Patient also complains of left sided muscular back pain that is worse/aggravated with movement.    Of note, on patients previous admission he did require ICU level care with pressor requirements while he was treated for DKA and UTI.    ED course:  Patient presents to ED found to have glucose greater than 500 on POCT. Found to have BRENDAN with creatinine of 1.6 up from previous baseline of 0.8. Given 1L of fluids and 6 units of insulin. Patients glucose improved down to 290 with just 6 units of insulin. Lactic acid of 2.2. U/A with increase WBC count but rare bacteria. Patient is afebrile, however initially presented with hypotension with systolic of 93/54.  Patient admitted to hospital medicine for hyperglycemia and Acute kidney injury.

## 2022-10-13 NOTE — ASSESSMENT & PLAN NOTE
Will hold anti-hypertensive medication at this time given patient hypotensive on presentation to 93/54. Patient on losartan, will need to also hold for BRENDAN.    -- hold losartan 25mg

## 2022-10-13 NOTE — PHARMACY MED REC
"Admission Medication History     The home medication history was taken by Leonard Booth.    You may go to "Admission" then "Reconcile Home Medications" tabs to review and/or act upon these items.     The home medication list has been updated by the Pharmacy department.   Please read ALL comments highlighted in yellow.   Please address this information as you see fit.    Feel free to contact us if you have any questions or require assistance.      The medications listed below were removed from the home medication list. Please reorder if appropriate:  Patient reports no longer taking the following medication(s):  MAGNESIUM OXIDE 400 MG TAB  ONDANSETRON ODT 4 MG TBDL    Medications listed below were obtained from: Nursing home    Current Outpatient Medications on File Prior to Encounter   Medication Sig    amiodarone (PACERONE) 200 MG Tab   Take 1 tablet (200 mg total) by mouth once daily.    apixaban (ELIQUIS) 5 mg Tab Take 1 tablet (5 mg total) by mouth 2 (two) times daily.      atorvastatin (LIPITOR) 40 MG tablet Take 1 tablet (40 mg total) by mouth once daily.      cetirizine (ZYRTEC) 10 MG tablet Take 1 tablet (10 mg total) by mouth every evening.      clopidogreL (PLAVIX) 75 mg tablet   Take 75 mg by mouth once daily.    diclofenac sodium (VOLTAREN) 1 % Gel   Apply 4 g topically 3 (three) times daily. Apply to sacrun closed skin/buttocks    docusate sodium (COLACE) 100 MG capsule   Take 100 mg by mouth once daily at 6am.    ergocalciferol (ERGOCALCIFEROL) 50,000 unit Cap   Take 1 capsule (50,000 Units total) by mouth every 7 days.    gabapentin (NEURONTIN) 100 MG capsule   Take 1 capsule (100 mg total) by mouth 2 (two) times daily.    glucose 4 GM chewable tablet Take 4 tablets (16 g total) by mouth as needed for Low blood sugar (50 - 70).      glucose 4 GM chewable tablet Take 6 tablets (24 g total) by mouth as needed for Low blood sugar (< 50).      insulin aspart U-100 (NOVOLOG) 100 unit/mL (3 mL) InPn pen  Inject " "2-12 Units into the skin. Inject 2-10 units into the skin per sliding scale: 131-180 - 2 units, 181-240 - 4 units, 241-300 - 6 units, 301-350 - 8 units, 351-400 - 10 units, >400 - 12 units and call MD.)      insulin detemir U-100 (LEVEMIR FLEXTOUCH) 100 unit/mL (3 mL) SubQ InPn pen   Inject 6 Units into the skin 2 (two) times daily.    lacosamide (VIMPAT) 100 mg Tab   Take 1 tablet (100 mg total) by mouth every 12 (twelve) hours.    losartan (COZAAR) 25 MG tablet   Take 25 mg by mouth once daily.    methocarbamoL (ROBAXIN) 500 MG Tab   Take 1 tablet (500 mg total) by mouth 4 (four) times daily. for 10 days    metoprolol succinate (TOPROL-XL) 25 MG 24 hr tablet Take 1 tablet (25 mg total) by mouth once daily.    MULTIVITAMIN ORAL Take 1 tablet by mouth once daily.       nitroGLYCERIN (NITROSTAT) 0.4 MG SL tablet Place 1 tablet (0.4 mg total) under the tongue every 5 (five) minutes as needed for Chest pain.      pantoprazole (PROTONIX) 40 MG tablet   Take 1 tablet (40 mg total) by mouth once daily.    polycarbophil (FIBERCON) 625 mg tablet   Take 1 tablet by mouth once daily.     psyllium husk, with sugar, (METAMUCIL, WITH SUGAR,) 3.4 gram packet   Take 1 packet by mouth 2 (two) times daily.    sertraline (ZOLOFT) 50 MG tablet   Take 50 mg by mouth once daily.    sucralfate (CARAFATE) 1 gram tablet   Take 1 g by mouth 3 (three) times daily before meals.    tamsulosin (FLOMAX) 0.4 mg Cap   Take 1 capsule (0.4 mg total) by mouth every evening.    BD ULTRA-FINE SHORT PEN NEEDLE 31 gauge x 5/16" Ndle 3 (three) times daily.    blood sugar diagnostic Strp 1 strip by Misc.(Non-Drug; Combo Route) route 2 (two) times a day.      insulin aspart U-100 (NOVOLOG) 100 unit/mL (3 mL) InPn pen   Inject 5 units into the skin with meals (breakfast, lunch and dinner).    lancets (ONETOUCH ULTRASOFT LANCETS) Misc 1 lancet by Misc.(Non-Drug; Combo Route) route 2 (two) times a day.      pen needle, diabetic (PEN NEEDLE) 30 gauge x 5/16" " Ndle   1 Units by Misc.(Non-Drug; Combo Route) route 3 (three) times daily.       Potential issues to be addressed PRIOR TO DISCHARGE  The listed medications were obtained from another facility ([facility]). The patient may not have been able to fill these prescriptions prior to this admission and may require new scripts upon discharge.     Leonard Booth CPhT  EXT [phone]                  .

## 2022-10-14 LAB
ALBUMIN SERPL BCP-MCNC: 2.5 G/DL (ref 3.5–5.2)
ALP SERPL-CCNC: 137 U/L (ref 55–135)
ALT SERPL W/O P-5'-P-CCNC: 20 U/L (ref 10–44)
ANION GAP SERPL CALC-SCNC: 9 MMOL/L (ref 8–16)
AST SERPL-CCNC: 24 U/L (ref 10–40)
BASOPHILS # BLD AUTO: 0.03 K/UL (ref 0–0.2)
BASOPHILS NFR BLD: 0.4 % (ref 0–1.9)
BILIRUB SERPL-MCNC: 0.3 MG/DL (ref 0.1–1)
BUN SERPL-MCNC: 16 MG/DL (ref 8–23)
CALCIUM SERPL-MCNC: 8.6 MG/DL (ref 8.7–10.5)
CHLORIDE SERPL-SCNC: 104 MMOL/L (ref 95–110)
CO2 SERPL-SCNC: 25 MMOL/L (ref 23–29)
CREAT SERPL-MCNC: 1 MG/DL (ref 0.5–1.4)
DIFFERENTIAL METHOD: ABNORMAL
EOSINOPHIL # BLD AUTO: 0.6 K/UL (ref 0–0.5)
EOSINOPHIL NFR BLD: 8.4 % (ref 0–8)
ERYTHROCYTE [DISTWIDTH] IN BLOOD BY AUTOMATED COUNT: 11.5 % (ref 11.5–14.5)
EST. GFR  (NO RACE VARIABLE): >60 ML/MIN/1.73 M^2
GLUCOSE SERPL-MCNC: 162 MG/DL (ref 70–110)
HCT VFR BLD AUTO: 36.8 % (ref 40–54)
HGB BLD-MCNC: 12.1 G/DL (ref 14–18)
IMM GRANULOCYTES # BLD AUTO: 0.03 K/UL (ref 0–0.04)
IMM GRANULOCYTES NFR BLD AUTO: 0.4 % (ref 0–0.5)
LYMPHOCYTES # BLD AUTO: 1.8 K/UL (ref 1–4.8)
LYMPHOCYTES NFR BLD: 26.1 % (ref 18–48)
MAGNESIUM SERPL-MCNC: 1.5 MG/DL (ref 1.6–2.6)
MCH RBC QN AUTO: 30 PG (ref 27–31)
MCHC RBC AUTO-ENTMCNC: 32.9 G/DL (ref 32–36)
MCV RBC AUTO: 91 FL (ref 82–98)
MONOCYTES # BLD AUTO: 0.8 K/UL (ref 0.3–1)
MONOCYTES NFR BLD: 10.8 % (ref 4–15)
NEUTROPHILS # BLD AUTO: 3.8 K/UL (ref 1.8–7.7)
NEUTROPHILS NFR BLD: 53.9 % (ref 38–73)
NRBC BLD-RTO: 0 /100 WBC
PHOSPHATE SERPL-MCNC: 3.1 MG/DL (ref 2.7–4.5)
PLATELET # BLD AUTO: 224 K/UL (ref 150–450)
PMV BLD AUTO: 9 FL (ref 9.2–12.9)
POCT GLUCOSE: 136 MG/DL (ref 70–110)
POCT GLUCOSE: 236 MG/DL (ref 70–110)
POCT GLUCOSE: 276 MG/DL (ref 70–110)
POTASSIUM SERPL-SCNC: 4 MMOL/L (ref 3.5–5.1)
PROT SERPL-MCNC: 5.9 G/DL (ref 6–8.4)
RBC # BLD AUTO: 4.03 M/UL (ref 4.6–6.2)
SODIUM SERPL-SCNC: 138 MMOL/L (ref 136–145)
WBC # BLD AUTO: 7.02 K/UL (ref 3.9–12.7)

## 2022-10-14 PROCEDURE — 83735 ASSAY OF MAGNESIUM: CPT

## 2022-10-14 PROCEDURE — 85025 COMPLETE CBC W/AUTO DIFF WBC: CPT

## 2022-10-14 PROCEDURE — 84100 ASSAY OF PHOSPHORUS: CPT

## 2022-10-14 PROCEDURE — G0378 HOSPITAL OBSERVATION PER HR: HCPCS

## 2022-10-14 PROCEDURE — 94761 N-INVAS EAR/PLS OXIMETRY MLT: CPT

## 2022-10-14 PROCEDURE — U0005 INFEC AGEN DETEC AMPLI PROBE: HCPCS | Performed by: STUDENT IN AN ORGANIZED HEALTH CARE EDUCATION/TRAINING PROGRAM

## 2022-10-14 PROCEDURE — 97165 OT EVAL LOW COMPLEX 30 MIN: CPT

## 2022-10-14 PROCEDURE — 99226 PR SUBSEQUENT OBSERVATION CARE,LEVEL III: CPT | Mod: GC,,, | Performed by: STUDENT IN AN ORGANIZED HEALTH CARE EDUCATION/TRAINING PROGRAM

## 2022-10-14 PROCEDURE — 97161 PT EVAL LOW COMPLEX 20 MIN: CPT

## 2022-10-14 PROCEDURE — 25000003 PHARM REV CODE 250

## 2022-10-14 PROCEDURE — 97530 THERAPEUTIC ACTIVITIES: CPT

## 2022-10-14 PROCEDURE — 80053 COMPREHEN METABOLIC PANEL: CPT

## 2022-10-14 PROCEDURE — 99226 PR SUBSEQUENT OBSERVATION CARE,LEVEL III: ICD-10-PCS | Mod: GC,,, | Performed by: STUDENT IN AN ORGANIZED HEALTH CARE EDUCATION/TRAINING PROGRAM

## 2022-10-14 PROCEDURE — 82962 GLUCOSE BLOOD TEST: CPT | Mod: 91

## 2022-10-14 PROCEDURE — 36415 COLL VENOUS BLD VENIPUNCTURE: CPT

## 2022-10-14 PROCEDURE — U0003 INFECTIOUS AGENT DETECTION BY NUCLEIC ACID (DNA OR RNA); SEVERE ACUTE RESPIRATORY SYNDROME CORONAVIRUS 2 (SARS-COV-2) (CORONAVIRUS DISEASE [COVID-19]), AMPLIFIED PROBE TECHNIQUE, MAKING USE OF HIGH THROUGHPUT TECHNOLOGIES AS DESCRIBED BY CMS-2020-01-R: HCPCS | Performed by: STUDENT IN AN ORGANIZED HEALTH CARE EDUCATION/TRAINING PROGRAM

## 2022-10-14 RX ORDER — INSULIN ASPART 100 [IU]/ML
15 INJECTION, SOLUTION INTRAVENOUS; SUBCUTANEOUS 3 TIMES DAILY
Qty: 162 ML | Refills: 0 | Status: SHIPPED | OUTPATIENT
Start: 2022-10-14 | End: 2022-10-14 | Stop reason: SDUPTHER

## 2022-10-14 RX ORDER — LOSARTAN POTASSIUM 25 MG/1
25 TABLET ORAL DAILY
Status: DISCONTINUED | OUTPATIENT
Start: 2022-10-14 | End: 2022-10-15 | Stop reason: HOSPADM

## 2022-10-14 RX ORDER — LIDOCAINE 50 MG/G
2 PATCH TOPICAL
Status: DISCONTINUED | OUTPATIENT
Start: 2022-10-14 | End: 2022-10-15 | Stop reason: HOSPADM

## 2022-10-14 RX ORDER — GABAPENTIN 100 MG/1
100 CAPSULE ORAL 3 TIMES DAILY
Status: DISCONTINUED | OUTPATIENT
Start: 2022-10-14 | End: 2022-10-15 | Stop reason: HOSPADM

## 2022-10-14 RX ORDER — INSULIN ASPART 100 [IU]/ML
15 INJECTION, SOLUTION INTRAVENOUS; SUBCUTANEOUS
Status: DISCONTINUED | OUTPATIENT
Start: 2022-10-14 | End: 2022-10-15 | Stop reason: HOSPADM

## 2022-10-14 RX ORDER — OXYCODONE HYDROCHLORIDE 5 MG/1
5 TABLET ORAL EVERY 6 HOURS PRN
Status: DISCONTINUED | OUTPATIENT
Start: 2022-10-14 | End: 2022-10-15 | Stop reason: HOSPADM

## 2022-10-14 RX ORDER — METHOCARBAMOL 500 MG/1
500 TABLET, FILM COATED ORAL NIGHTLY PRN
Status: DISCONTINUED | OUTPATIENT
Start: 2022-10-14 | End: 2022-10-15 | Stop reason: HOSPADM

## 2022-10-14 RX ORDER — METHOCARBAMOL 500 MG/1
500 TABLET, FILM COATED ORAL NIGHTLY PRN
Qty: 30 TABLET | Refills: 1 | Status: SHIPPED | OUTPATIENT
Start: 2022-10-14 | End: 2022-10-24

## 2022-10-14 RX ORDER — INSULIN ASPART 100 [IU]/ML
1-10 INJECTION, SOLUTION INTRAVENOUS; SUBCUTANEOUS
Qty: 30 ML | Refills: 3 | Status: ON HOLD | OUTPATIENT
Start: 2022-10-14 | End: 2023-03-01 | Stop reason: HOSPADM

## 2022-10-14 RX ORDER — LIDOCAINE 50 MG/G
1 PATCH TOPICAL
Status: DISCONTINUED | OUTPATIENT
Start: 2022-10-14 | End: 2022-10-15 | Stop reason: HOSPADM

## 2022-10-14 RX ORDER — INSULIN ASPART 100 [IU]/ML
15 INJECTION, SOLUTION INTRAVENOUS; SUBCUTANEOUS
Qty: 162 ML | Refills: 0 | Status: ON HOLD | OUTPATIENT
Start: 2022-10-14 | End: 2023-03-01 | Stop reason: SDUPTHER

## 2022-10-14 RX ADMIN — LACOSAMIDE 100 MG: 50 TABLET, FILM COATED ORAL at 08:10

## 2022-10-14 RX ADMIN — LIDOCAINE 2 PATCH: 50 PATCH CUTANEOUS at 01:10

## 2022-10-14 RX ADMIN — GABAPENTIN 100 MG: 100 CAPSULE ORAL at 09:10

## 2022-10-14 RX ADMIN — ACETAMINOPHEN 650 MG: 325 TABLET ORAL at 11:10

## 2022-10-14 RX ADMIN — INSULIN ASPART 4 UNITS: 100 INJECTION, SOLUTION INTRAVENOUS; SUBCUTANEOUS at 09:10

## 2022-10-14 RX ADMIN — LACOSAMIDE 100 MG: 50 TABLET, FILM COATED ORAL at 09:10

## 2022-10-14 RX ADMIN — LIDOCAINE 1 PATCH: 50 PATCH CUTANEOUS at 01:10

## 2022-10-14 RX ADMIN — PANTOPRAZOLE SODIUM 40 MG: 40 TABLET, DELAYED RELEASE ORAL at 09:10

## 2022-10-14 RX ADMIN — INSULIN ASPART 6 UNITS: 100 INJECTION, SOLUTION INTRAVENOUS; SUBCUTANEOUS at 11:10

## 2022-10-14 RX ADMIN — INSULIN ASPART 12 UNITS: 100 INJECTION, SOLUTION INTRAVENOUS; SUBCUTANEOUS at 09:10

## 2022-10-14 RX ADMIN — INSULIN ASPART 15 UNITS: 100 INJECTION, SOLUTION INTRAVENOUS; SUBCUTANEOUS at 11:10

## 2022-10-14 RX ADMIN — INSULIN ASPART 1 UNITS: 100 INJECTION, SOLUTION INTRAVENOUS; SUBCUTANEOUS at 08:10

## 2022-10-14 RX ADMIN — INSULIN ASPART 15 UNITS: 100 INJECTION, SOLUTION INTRAVENOUS; SUBCUTANEOUS at 04:10

## 2022-10-14 RX ADMIN — INSULIN DETEMIR 6 UNITS: 100 INJECTION, SOLUTION SUBCUTANEOUS at 09:10

## 2022-10-14 RX ADMIN — LOSARTAN POTASSIUM 25 MG: 25 TABLET, FILM COATED ORAL at 10:10

## 2022-10-14 RX ADMIN — GABAPENTIN 100 MG: 100 CAPSULE ORAL at 08:10

## 2022-10-14 RX ADMIN — SERTRALINE HYDROCHLORIDE 50 MG: 50 TABLET ORAL at 09:10

## 2022-10-14 RX ADMIN — CLOPIDOGREL 75 MG: 75 TABLET, FILM COATED ORAL at 09:10

## 2022-10-14 RX ADMIN — ATORVASTATIN CALCIUM 40 MG: 40 TABLET, FILM COATED ORAL at 09:10

## 2022-10-14 RX ADMIN — APIXABAN 5 MG: 5 TABLET, FILM COATED ORAL at 09:10

## 2022-10-14 RX ADMIN — AMIODARONE HYDROCHLORIDE 200 MG: 200 TABLET ORAL at 09:10

## 2022-10-14 RX ADMIN — INSULIN DETEMIR 6 UNITS: 100 INJECTION, SOLUTION SUBCUTANEOUS at 08:10

## 2022-10-14 RX ADMIN — METHOCARBAMOL 500 MG: 500 TABLET ORAL at 09:10

## 2022-10-14 RX ADMIN — OXYCODONE 5 MG: 5 TABLET ORAL at 11:10

## 2022-10-14 RX ADMIN — CETIRIZINE HYDROCHLORIDE 10 MG: 10 TABLET, FILM COATED ORAL at 08:10

## 2022-10-14 RX ADMIN — APIXABAN 5 MG: 5 TABLET, FILM COATED ORAL at 08:10

## 2022-10-14 RX ADMIN — TAMSULOSIN HYDROCHLORIDE 0.4 MG: 0.4 CAPSULE ORAL at 08:10

## 2022-10-14 NOTE — ASSESSMENT & PLAN NOTE
Patient with Paroxysmal (<7 days) atrial fibrillation which is controlled currently with Amiodarone. Patient is currently in sinus rhythm.[id number] Score: 4.  Anticoagulation indicated. Anticoagulation done with eliquis.  -- rate controlled while admitted

## 2022-10-14 NOTE — ASSESSMENT & PLAN NOTE
Patient with acute kidney injury likely due to IVVD/dehydration BRENDAN is currently to be determined on next CMP. Labs reviewed- Renal function/electrolytes with Estimated Creatinine Clearance: 63.2 mL/min (based on SCr of 1 mg/dL). according to latest data. Monitor urine output and serial BMP and adjust therapy as needed. Avoid nephrotoxins and renally dose meds for GFR listed above. Recent creatinine level prior to today was 0.8.    DDX: prerenal from volume depletion 2/2 glucosuria, obstruction, ischemic ATN from hypotension    Plan:  --avoid nephrotoxic agents  --avoid NSAIDs  --renally dose medications  --encourage PO intake  -- Cr at baseline on 10/14

## 2022-10-14 NOTE — CONSULTS
Nutrition-Related Diabetes Education      Time Spent: 5 minutes    Learners: Patient    Current HbA1c: 10.6%    Is patient aware of their A1c and their goal A1c?       __x_____ yes      Nutrition Education with handouts: MyPlate, CHO Counting handouts given on 10/6    Comments (10/6): Pt reports he is familiar with the diabetic diet. Provided and explained handout detailing sources of carbohydrates, appropriate serving sizes, and the plate method for meal planning. Pt voiced understanding. All questions and concerns answered.    DM diet. States intake 25% d/t not liking breakfast meal provided- RD provided pt with menu. RD to add ONS per pt request. Issues with n/d but no issues with v/c/chewing/swallowing. UBW: 200# per chart review, however per H & P, pt w/ decreased appetite PTA x 5 weeks 2/2 passing away of wife.     Noted pt educated on Diabetic diet 6 times this year.    Barriers to Learning: Non-compliance    Follow up: Yes    Please consult as needed.  Thank you!     Marjorie FOX-SOBEIDAN

## 2022-10-14 NOTE — PT/OT/SLP EVAL
Physical Therapy  Evaluation and Treatment    Kamar Muñoz   [id number]    Time Tracking:     PT Received On: 10/14/22   PT Start Time: 0928   PT Stop Time: 0945   PT Total Time (min): 17 min    Billable Minutes: Evaluation 8 minutes and Therapeutic Activity 9 minutes       Recommendations:     Discharge recommendations: Skilled Nursing Facility     Equipment recommendations: Bedside Commode    Barriers to Discharge: None    Patient Information:     Recent Surgery: * No surgery found *      Diagnosis: Type 2 diabetes mellitus with hyperglycemia, with long-term current use of insulin    Length of Stay: 2 days    General Precautions: Standard, fall, diabetic  Orthopedic Precautions: None  Brace: None    Assessment:     Kamar Muñoz is a 77 y.o. male admitted to [facility] on 10/12/2022 for Type 2 diabetes mellitus with hyperglycemia, with long-term current use of insulin. Kamar Muñoz tolerated evaluation poorly today. He seemed minimally confused upon my entry to room, reporting to therapist that he can't get up as the team was preparing him for a procedure. Confirmed via chart review, no plans or orders for any procedure today and then he was more willing to participate in PT. Reports moderate L lower back pain with simply resting in bed. He required max (A) to transition supine -> sitting up via L sidelying (attempted to get up on R side of bed but unable due to pain). Increase in pain with both standing and sitting today, limited time in these positions. Able to sit up with stand-by assistance but required moderate (A) to stand from side of bed. Only able to stand for 4-5 seconds before needing to sit due to L lower back pain, grimacing and using his L hand to hold/stabilize at site of pain. Assisted back into bed and positioned in L sidelying in bed, much more comfortable here. Discussed PT role, POC, goals and recommendations (Skilled Nursing Facility) with patient; verbalized understanding. Kamar CASH  "Toni would benefit from acute PT services to promote mobility during this admission and improve return to PLOF.    Problem List: weakness, decreased endurance, impaired self-care skills, impaired mobility, decreased sitting or standing balance, gait instability, decreased cognition, and pain    Rehab Prognosis: Fair; patient would benefit from acute skilled PT services to address these deficits and reach maximum level of function.    Plan:     Patient to be seen 3 x/week to address the above listed problems via gait training, therapeutic activities, therapeutic exercises, neuromuscular re-education    Plan of Care Expires: 11/13/22  Plan of Care reviewed with: patient    Subjective:     Communicated with RN prior to evaluation, appropriate to see for evaluation.    Pt found supine in bed (HOB elevated) upon PT entry to room, agreeable to evaluation.    Patient commenting: "I don't know how much I can do, my back really hurts and I know getting up and trying to walk is going to make it hurt more."    Does this patient have any cultural, spiritual, Voodoo conflicts given the current situation? Patient has no barriers to learning. Patient verbalizes understanding of his/her program and goals and demonstrates them correctly. No cultural, spiritual, or educational needs identified.    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    Hyperlipidemia     Hypertension     Kidney stone     Migraine headache     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 08/02/2012    STEMI involving right coronary artery 01/09/2022    Type II or unspecified type diabetes mellitus with neurological manifestations, uncontrolled(250.62) 03/08/2013    Urinary tract infection       Past Surgical History:   Procedure Laterality Date    BACK SURGERY      " CATARACT EXTRACTION W/  INTRAOCULAR LENS IMPLANT Right     Per Dr Romero note 11/2018    COLONOSCOPY N/A 1/28/2019    Procedure: COLONOSCOPY Suprep;  Surgeon: Anh Johnson MD;  Location: [facility] [place] ENDO;  Service: Endoscopy;  Laterality: N/A;    ESOPHAGOGASTRODUODENOSCOPY N/A 9/15/2022    Procedure: EGD (ESOPHAGOGASTRODUODENOSCOPY);  Surgeon: Dashawn Evans MD;  Location: [facility] [place] ENDO;  Service: Endoscopy;  Laterality: N/A;    EYE SURGERY      HERNIA REPAIR      LEFT HEART CATHETERIZATION Left 1/9/2022    Procedure: CATHETERIZATION, HEART, LEFT;  Surgeon: Will Hurst III, MD;  Location: [facility] [place] CATH LAB/EP;  Service: Cardiology;  Laterality: Left;    renal stones      SHOULDER OPEN ROTATOR CUFF REPAIR         Living Environment:  Pt admitted from [facility].    PLOF:  Prior to admission, patient was ambulatory using his rolling walker at facility.    DME:  Patient owns or has access to the following DME: Rolling Walker and Wheelchair    Upon discharge, patient will have assistance from facility staff.    Objective:     Patient found with: bed alarm, telemetry, Condom Catheter    Pain:  Pain Rating 1: 8/10 at L lower back, increases with sitting EOB and standing  Pain Rating Post-Intervention 1: 8/10 (same, see above)    Cognitive Exam:  Patient is oriented to Person, Place, and Time.  Patient follows 100% of single-step commands.    Sensation:   Intact at BLE to LT    Lower Extremity Range of Motion:  Right Lower Extremity: WFL actively  Left Lower Extremity: WFL actively    Lower Extremity Strength:  Right Lower Extremity: grossly 3/5 via MMT  Left Lower Extremity: grossly 3/5 via MMT    Functional Mobility:    Bed Mobility:  Rolling to L: min (A) via log roll  Supine to Sitting: max (A) via L SL  Sitting to Supine: max (A) via L SL    Transfers:  Sit to Stand: moderate (A) from EOB with no AD x 1 trial; therapist squatting down in front of patient and assisting at hips, pt's hands on  therapist's upper arms for UE support    Gait:  Unable to assess today due to significant L lower back pain in standing, only tolerated 4-5 seconds of standing before needing to sit    Balance:  Static Sit: Stand-By Assist at EOB    Static Stand: Min Assist with no AD (pt's hands on therapist's upper arms for support), only tolerated 4-5 seconds of standing before needing to sit    Additional Therapeutic Activity/Exercises:     1. He seemed minimally confused upon my entry to room, reporting to therapist that he can't get up as the team was preparing him for a procedure. Confirmed via chart review, no plans or orders for any procedure today and then he was more willing to participate in PT.    2. Reports moderate L lower back pain with simply resting in bed. He required max (A) to transition supine -> sitting up via L sidelying (attempted to get up on R side of bed but unable due to pain). Increase in pain with both standing and sitting today, limited time in these positions.    3. Able to sit up with stand-by assistance but required moderate (A) to stand from side of bed. Only able to stand for 4-5 seconds before needing to sit due to L lower back pain, grimacing and using his L hand to hold/stabilize at site of pain. Assisted back into bed and positioned in L sidelying in bed, much more comfortable here.    4. Discussed PT role, POC, goals and recommendations (Skilled Nursing Facility) with patient; verbalized understanding.    AM-PAC 6 CLICK MOBILITY  Turning over in bed (including adjusting bedclothes, sheets and blankets)?: 3  Sitting down on and standing up from a chair with arms (e.g., wheelchair, bedside commode, etc.): 2  Moving from lying on back to sitting on the side of the bed?: 2  Moving to and from a bed to a chair (including a wheelchair)?: 2  Need to walk in hospital room?: 2  Climbing 3-5 steps with a railing?: 1  Basic Mobility Total Score: 12    Patient was left  resting in L sidelying  with all  lines intact, call button in reach, and bed alarm on.    Clinical Decision Making for Evaluation Complexity:  1. Body System(s) Examination: 1-2  2. Clinical Presentation: Evolving  3. Evaluation Complexity: Low    GOALS:   Multidisciplinary Problems       Physical Therapy Goals          Problem: Physical Therapy    Goal Priority Disciplines Outcome Goal Variances Interventions   Physical Therapy Goal     PT, PT/OT      Description: Goals to be met by: 10/28/22     Patient will increase functional independence with mobility by performin. Supine to sit with Stand-by Assistance - Not met  2. Sit to supine with Stand-by Assistance - Not met  3. Sit to stand transfer with Contact Guard Assistance using RW - Not met  4. Bed to chair transfer with Contact Guard Assistance using Rolling Walker - Not met  5. Gait  x 50 feet with Contact Guard Assistance using Rolling Walker - Not met                     Shashank Marin, PT  10/14/2022

## 2022-10-14 NOTE — ASSESSMENT & PLAN NOTE
77 year old M w recent admission for DKA presenting with hyperglycemia likely HHS causing volume depletion and acute kidney injruy. Patient responded well with 6 units of regular insulin in the ED going from sugar of 490s down to 290. Will resume his subq insulin without having to do insulin gtt given improvement.    Patient's FSGs are uncontrolled due to hyperglycemia on current medication regimen.  Last A1c reviewed-   Lab Results   Component Value Date    HGBA1C 10.6 (H) 10/05/2022     Most recent fingerstick glucose reviewed-   Recent Labs   Lab 10/13/22  2010 10/14/22  0759 10/14/22  1125 10/14/22  1526   POCTGLUCOSE 233* 236* 276* 136*     Current correctional scale  Medium  Maintain anti-hyperglycemic dose as follows-   Antihyperglycemics (From admission, onward)    Start     Stop Route Frequency Ordered    10/14/22 1130  insulin aspart U-100 pen 15 Units         -- SubQ 3 times daily with meals 10/14/22 1038    10/13/22 0900  insulin detemir U-100 pen 6 Units         -- SubQ 2 times daily 10/12/22 2244    10/12/22 2343  insulin aspart U-100 pen 1-10 Units         -- SubQ Before meals & nightly PRN 10/12/22 2244        Hold Oral hypoglycemics while patient is in the hospital.  Current regimen: Detemir 6u BID; Aspart 15u TIDWM; MDSSI  BGL controlled on current regimen

## 2022-10-14 NOTE — PLAN OF CARE
Problem: Adult Inpatient Plan of Care  Goal: Patient-Specific Goal (Individualized)  Outcome: Ongoing, Progressing     Problem: Adult Inpatient Plan of Care  Goal: Optimal Comfort and Wellbeing  Outcome: Ongoing, Progressing     Problem: Diabetes Comorbidity  Goal: Blood Glucose Level Within Targeted Range  Outcome: Ongoing, Progressing

## 2022-10-14 NOTE — PLAN OF CARE
[facility]ation  Discharge Assessment    Primary Care Provider: Basim Guerrero MD     Discharge Assessment (most recent)       BRIEF DISCHARGE ASSESSMENT - 10/14/22 2597          Discharge Planning    Assessment Type Discharge Planning Brief Assessment     Resource/Environmental Concerns none     Support Systems Children     Equipment Currently Used at Home other (see comments)   Pt discharging to a facility    Current Living Arrangements residential facility     Care Facility Name Pt is currently skilled at [facility]     Patient/Family Anticipates Transition to inpatient rehabilitation facility   Back to [facility]    Patient/Family Anticipated Services at Transition rehabilitation services;skilled nursing     DME Needed Upon Discharge  none     Discharge Plan A Skilled Nursing Facility     Discharge Plan B Skilled Nursing Facility                   Pt will discharge (tentatively 10/15/22) back to [facility] for skilled placement once medically ready. Nursing home orders sent 10/14/22. Please fax Covid test to: [phone] and let the  know it's going to the 2nd floor fax machine for the nurses to review.     JASIEL spoke with Pt's daughter, Marisa about discharge planning. JASIEL sent a message to Pt's MD to call Marisa per her request.    JASIEL will follow for discharge needs.     SALVATORE Adames, JASIEL  [facility]  [id number]

## 2022-10-14 NOTE — ASSESSMENT & PLAN NOTE
Patient with recent admission with UTI. Ucx showed no predominant organisms. Switched from zosyn to augmentin during previous admission. Patient on this admission with urine microscopy with many WBC, 3+ leukocytes, but few bacteria. No symptoms of UTI on exam.     Plan:  --hold off on abx

## 2022-10-14 NOTE — PLAN OF CARE
NURSING HOME ORDERS    10/14/2022  [facility]  [facility] - OBSERVATION  [address]  [place] [address]  Dept: [phone]  Loc: [phone]     Admit to Nursing Home: [facility], Returning Skilled    Diagnoses:  Active Hospital Problems    Diagnosis  POA    *Type 2 diabetes mellitus with hyperglycemia, with long-term current use of insulin [E11.65, Z79.4]  Not Applicable     Chronic    History of UTI [Z87.440]  Unknown    Hypotension due to hypovolemia [I95.89, E86.1]  Unknown    Goals of care, counseling/discussion [Z71.89]  Not Applicable    Chronic anticoagulation [Z79.01]  Not Applicable    History of partial seizures [Z86.69]  Not Applicable    Stented coronary artery [Z95.5]  Not Applicable    Paroxysmal atrial fibrillation [I48.0]  Yes     Chronic    Coronary artery disease involving native coronary artery of native heart with unstable angina pectoris [I25.110]  Yes     Chronic    BRENDAN (acute kidney injury) [N17.9]  Unknown    Essential hypertension [I10]  Yes     Chronic      Resolved Hospital Problems    Diagnosis Date Resolved POA    History of ST elevation myocardial infarction (STEMI) [I25.2] 10/12/2022 Not Applicable     Chronic    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 [E13.40] 10/12/2022 Yes     Chronic       Patient is homebound due to:  Type 2 diabetes mellitus with hyperglycemia, with long-term current use of insulin    Allergies:  Review of patient's allergies indicates:   Allergen Reactions    Iodine      Other reaction(s): swelling  Other reaction(s): Itching  Other reaction(s): Rash       Vitals:  Routine    Diet: Diabetic Dieet: 2000 calorie - No concentrated sugars    Activities:   Activity as tolerated    Goals of Care Treatment Preferences:  Code Status: DNR      Labs:  POCT BGL ACHS    Nursing Precautions:  Fall    Consults:   PT to evaluate and treat- 3 times a week, OT to evaluate and treat- 3 times a week, and ST to evaluate and treat- 3  times a week     Miscellaneous Care: Diabetes Care: Diabetes: Check blood sugar. Fingerstick blood sugar AC and HS  Sliding Scale/Hypoglycemia Protocol: **MODERATE CORRECTION DOSE**  Blood Glucose  mg/dL    Pre-meal     Bedtime  151-200  2 units        1 unit  201-250   4 units       2 units   251-300  6 units        3 units   301-350   8 units       4 units   >350      10 units        5 units  **CALL MD for BG >350**                   Diabetes Care:  SN to perform and educate Diabetic management with blood glucose monitoring:, Fingerstick blood sugar AC and HS, and Report CBG < 60 or > 350 to physician.      Medications: Discontinue all previous medication orders, if any. See new list below.     Medication List        CHANGE how you take these medications      * insulin aspart U-100 100 unit/mL (3 mL) Inpn pen  Commonly known as: NovoLOG  Inject 1-10 Units into the skin before meals and at bedtime as needed (Hyperglycemia).  What changed:   how much to take  when to take this  reasons to take this     * insulin aspart U-100 100 unit/mL (3 mL) Inpn pen  Commonly known as: NovoLOG  Inject 15 Units into the skin 3 (three) times daily with meals.  What changed:   how much to take  when to take this     methocarbamoL 500 MG Tab  Commonly known as: ROBAXIN  Take 1 tablet (500 mg total) by mouth nightly as needed.  What changed:   when to take this  reasons to take this           * This list has 2 medication(s) that are the same as other medications prescribed for you. Read the directions carefully, and ask your doctor or other care provider to review them with you.                CONTINUE taking these medications      amiodarone 200 MG Tab  Commonly known as: PACERONE  Take 1 tablet (200 mg total) by mouth once daily.     atorvastatin 40 MG tablet  Commonly known as: LIPITOR  Take 1 tablet (40 mg total) by mouth once daily.     blood sugar diagnostic Strp  1 strip by Misc.(Non-Drug; Combo Route) route 2 (two) times a  day.     cetirizine 10 MG tablet  Commonly known as: ZYRTEC  Take 1 tablet (10 mg total) by mouth every evening.     clopidogreL 75 mg tablet  Commonly known as: PLAVIX  Take 75 mg by mouth once daily.     diclofenac sodium 1 % Gel  Commonly known as: VOLTAREN  Apply 4 g topically 3 (three) times daily. Apply to sacrun closed skin/buttocks     docusate sodium 100 MG capsule  Commonly known as: COLACE  Take 100 mg by mouth once daily at 6am.     ELIQUIS 5 mg Tab  Generic drug: apixaban  Take 1 tablet (5 mg total) by mouth 2 (two) times daily.     ergocalciferol 50,000 unit Cap  Commonly known as: ERGOCALCIFEROL  Take 1 capsule (50,000 Units total) by mouth every 7 days.     gabapentin 100 MG capsule  Commonly known as: NEURONTIN  Take 1 capsule (100 mg total) by mouth 2 (two) times daily.     * glucose 4 GM chewable tablet  Take 4 tablets (16 g total) by mouth as needed for Low blood sugar (50 - 70).     * glucose 4 GM chewable tablet  Take 6 tablets (24 g total) by mouth as needed for Low blood sugar (< 50).     insulin detemir U-100 100 unit/mL (3 mL) Inpn pen  Commonly known as: Levemir FLEXTOUCH  Inject 6 Units into the skin 2 (two) times daily.     lacosamide 100 mg Tab  Commonly known as: VIMPAT  Take 1 tablet (100 mg total) by mouth every 12 (twelve) hours.     lancets Misc  Commonly known as: ONETOUCH ULTRASOFT LANCETS  1 lancet by Misc.(Non-Drug; Combo Route) route 2 (two) times a day.     losartan 25 MG tablet  Commonly known as: COZAAR  Take 25 mg by mouth once daily.     METAMUCIL (WITH SUGAR) 3.4 gram packet  Generic drug: psyllium husk (with sugar)  Take 1 packet by mouth 2 (two) times daily.     MULTIVITAMIN ORAL  Take 1 tablet by mouth once daily.     nitroGLYCERIN 0.4 MG SL tablet  Commonly known as: NITROSTAT  Place 1 tablet (0.4 mg total) under the tongue every 5 (five) minutes as needed for Chest pain.     pantoprazole 40 MG tablet  Commonly known as: PROTONIX  Take 1 tablet (40 mg total) by mouth  "once daily.     * pen needle, diabetic 30 gauge x 5/16" Ndle  Commonly known as: PEN NEEDLE  1 Units by Misc.(Non-Drug; Combo Route) route 3 (three) times daily.     * BD ULTRA-FINE SHORT PEN NEEDLE 31 gauge x 5/16" Ndle  Generic drug: pen needle, diabetic  3 (three) times daily.     polycarbophil 625 mg tablet  Commonly known as: FIBERCON  Take 1 tablet by mouth once daily.     sertraline 50 MG tablet  Commonly known as: ZOLOFT  Take 50 mg by mouth once daily.     sucralfate 1 gram tablet  Commonly known as: CARAFATE  Take 1 g by mouth 3 (three) times daily before meals.     tamsulosin 0.4 mg Cap  Commonly known as: FLOMAX  Take 1 capsule (0.4 mg total) by mouth every evening.           * This list has 4 medication(s) that are the same as other medications prescribed for you. Read the directions carefully, and ask your doctor or other care provider to review them with you.                STOP taking these medications      metoprolol succinate 25 MG 24 hr tablet  Commonly known as: TOPROL-XL                Immunizations Administered as of 10/14/2022       Name Date Dose VIS Date Route Exp Date    COVID-19, MRNA, LN-S, PF (Pfizer) (Purple Cap) 10/29/2021 0.3 mL -- Intramuscular --    Site: Left arm     : Pf[employer]     Lot: [id number]     COVID-19, MRNA, LN-S, PF (Pfizer) (Purple Cap) 1/29/2021 0.3 mL -- Intramuscular --    Site: Left deltoid     : Pf[employer] Inc     Lot: [id number]             This patient has had both covid vaccinations    Some patients may experience side effects after vaccination.  These may include fever, headache, muscle or joint aches.  Most symptoms resolve with 24-48 hours and do not require urgent medical evaluation unless they persist for more than 72 hours or symptoms are concerning for an unrelated medical condition.          _________________________________  Stewart Chan MD  10/14/2022   "

## 2022-10-14 NOTE — NURSING
No s/s of distress.VS stable  . Patient  resp even and unlabored. Safety precautions maintained. Bed alarm activated . Bed in low position call light within reach patient instructed to call if needed

## 2022-10-14 NOTE — ASSESSMENT & PLAN NOTE
"-Hx of CAD s/p placement of 2 LAUREN in RCA in 01/09/2022.   -Patient denied chest pain on admission, troponin WNL  -EKG on admission(10/05) Sinus rhythm with 1st degree A-V block  -Per chart review, "ASA discontinued 4/13 due to bleeding risk with triple therapy; continuing Plavix and Eliquis" "No need for triple therapy (ASA/Plavix/eliquis) beyond 1 month"     PLAN  -Continue home statin  -Continue home apixaban, clopidogrel   -resumed home losartan; holding metoprolol   "

## 2022-10-14 NOTE — PT/OT/SLP EVAL
Occupational Therapy   Evaluation    Name: Kamar Muñoz  MRN: [id number]  Admitting Diagnosis:  Type 2 diabetes mellitus with hyperglycemia, with long-term current use of insulin  Recent Surgery: * No surgery found *      Recommendations:     Discharge Recommendations: nursing facility, skilled  Discharge Equipment Recommendations:  bedside commode  Barriers to discharge:  None    Assessment:     Kamar Muñoz is a 77 y.o. male with a medical diagnosis of Type 2 diabetes mellitus with hyperglycemia, with long-term current use of insulin.  He presents with  independence with ADL's.  Pt declined EOB during session due to pain. Performance deficits affecting function: weakness, impaired endurance, impaired self care skills, impaired functional mobility, impaired balance, decreased upper extremity function, decreased lower extremity function, pain, decreased safety awareness.      Rehab Prognosis: Fair; patient would benefit from acute skilled OT services to address these deficits and reach maximum level of function.       Plan:     Patient to be seen 3 x/week to address the above listed problems via self-care/home management, therapeutic activities, therapeutic exercises, neuromuscular re-education  Plan of Care Expires: 22  Plan of Care Reviewed with: patient    Subjective     Chief Complaint: pain in back & feet  Patient/Family Comments/goals: to get back to home once stronger    Occupational Profile:  Living Environment: Pt resides alone (spouse recently passed away) in 1 story house with 1 step to enter.  Pt was independent with ADL's & ambulated with RW PTA.  Pt has recently been back & forth between [facility] & [facility].  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  Pt enjoys traveling.  Equipment Used at Home:  walker, rolling, wheelchair (tall toilet, walk-in shower with built-in shower  bench)  Assistance upon Discharge: none at home     Pain/Comfort:  Pain Rating 1:  (did not rate)  Location 1: back  Pain Addressed 1: Reposition, Distraction, Cessation of Activity, Nurse notified  Pain Rating Post-Intervention 1:  (did not rate)    Patients cultural, spiritual, Congregational conflicts given the current situation: no    Objective:     Communicated with: RN prior to session.  Patient found supine with bed alarm, telemetry, Condom Catheter (no family present) upon OT entry to room.    General Precautions: Standard, fall, seizure (DNR)   Orthopedic Precautions:N/A   Braces: N/A  Respiratory Status: Room air    Occupational Performance:    Bed Mobility:    Patient completed Rolling/Turning to Left with  minimum assistance  Patient completed Rolling/Turning to Right with moderate assistance  Patient completed Scooting/Bridging with minimum assistance up HOB while supine  Pt declined supine to sit to EOB due to pain in back.    Activities of Daily Living:  Grooming: set up (A) while supine in bed      Cognitive/Visual Perceptual:  Cognitive/Psychosocial Skills:     -       Oriented to: Person, Place, Time, and Situation   -       Follows Commands/attention:Follows multistep  commands  -       Safety awareness/insight to disability: intact     Physical Exam:  Sensation:    -       Impaired - numbness in palmar surface of hands except for thumb  Dominant hand:    -       right  Upper Extremity Range of Motion:  BUE WFL  Upper Extremity Strength: BUE WFL   Strength: BUE WFL    AMPAC 6 Click ADL:  AMPAC Total Score: 13    Treatment & Education:  Provided education on importance of EOB/OOB activities.  Reviewed therex pt has been performing supine in bed. Provided education regarding role of OT, POC, & discharge recommendations with pt  verbalizing understanding.  Pt had no further questions & when asked whether there were any concerns pt reported none.      Patient left supine with all lines intact, call  button in reach, bed alarm on, and RN notified    GOALS:   Multidisciplinary Problems       Occupational Therapy Goals          Problem: Occupational Therapy    Goal Priority Disciplines Outcome Interventions   Occupational Therapy Goal     OT, PT/OT Ongoing, Progressing    Description: Goals to be met by: 10/31     Patient will increase functional independence with ADLs by performing:    UE Dressing with Minimal Assistance.  LE Dressing with Moderate Assistance.  Grooming while seated with Stand-by Assistance.  Toileting from bedside commode with Minimal Assistance for hygiene and clothing management.   Sitting at edge of bed x15 minutes with Stand-by Assistance.  Rolling to Bilateral with Supervision.   Supine to sit with Moderate Assistance.  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;  Service: Endoscopy;  Laterality: N/A;    ESOPHAGOGASTRODUODENOSCOPY N/A 9/15/2022    Procedure: EGD (ESOPHAGOGASTRODUODENOSCOPY);  Surgeon: Dashawn Evans MD;  Location: [facility];  Service: Endoscopy;  Laterality: N/A;    EYE SURGERY      HERNIA REPAIR       LEFT HEART CATHETERIZATION Left 1/9/2022    Procedure: CATHETERIZATION, HEART, LEFT;  Surgeon: Will Hurst III, MD;  Location: [facility] CATH LAB/EP;  Service: Cardiology;  Laterality: Left;    renal stones      SHOULDER OPEN ROTATOR CUFF REPAIR         Time Tracking:     OT Date of Treatment: 10/14/22  OT Start Time: 1314  OT Stop Time: 1332  OT Total Time (min): 18 min    Billable Minutes:Evaluation 18    10/14/2022

## 2022-10-14 NOTE — PLAN OF CARE
Kamar Muñoz is a 77 y.o. male admitted to [facility] on 10/12/2022 for Type 2 diabetes mellitus with hyperglycemia, with long-term current use of insulin. Kamar Muñoz tolerated evaluation poorly today. He seemed minimally confused upon my entry to room, reporting to therapist that he can't get up as the team was preparing him for a procedure. Confirmed via chart review, no plans or orders for any procedure today and then he was more willing to participate in PT. Reports moderate L lower back pain with simply resting in bed. He required max (A) to transition supine -> sitting up via L sidelying (attempted to get up on R side of bed but unable due to pain). Increase in pain with both standing and sitting today, limited time in these positions. Able to sit up with stand-by assistance but required moderate (A) to stand from side of bed. Only able to stand for 4-5 seconds before needing to sit due to L lower back pain, grimacing and using his L hand to hold/stabilize at site of pain. Assisted back into bed and positioned in L sidelying in bed, much more comfortable here. Discussed PT role, POC, goals and recommendations (Skilled Nursing Facility) with patient; verbalized understanding. Kamar Muñoz would benefit from acute PT services to promote mobility during this admission and improve return to PLOF.    Problem: Physical Therapy  Goal: Physical Therapy Goal  Description: Goals to be met by: 10/28/22     Patient will increase functional independence with mobility by performin. Supine to sit with Stand-by Assistance - Not met  2. Sit to supine with Stand-by Assistance - Not met  3. Sit to stand transfer with Contact Guard Assistance using RW - Not met  4. Bed to chair transfer with Contact Guard Assistance using Rolling Walker - Not met  5. Gait  x 50 feet with Contact Guard Assistance using Rolling Walker - Not met  Outcome: Ongoing, Progressing    Shashank Marin, PT  10/14/2022

## 2022-10-14 NOTE — ASSESSMENT & PLAN NOTE
Will hold anti-hypertensive medication at this time given patient hypotensive on presentation to 93/54. Patient on losartan, will need to also hold for BRENDAN.    -- Resumed home losartan 25mg qd on 10/14 upon resolution of BRENDAN

## 2022-10-14 NOTE — SUBJECTIVE & OBJECTIVE
Interval History: NAEON. Fasting  this morning. Patient reporting chronic back pain, otherwise denies any acute complaints. Tolerating PO.     Review of Systems   Constitutional:  Positive for fatigue. Negative for fever.   HENT:  Negative for sore throat and trouble swallowing.    Eyes:  Negative for pain and visual disturbance.   Respiratory:  Negative for shortness of breath.    Cardiovascular:  Negative for chest pain and leg swelling.   Gastrointestinal:  Negative for abdominal pain, diarrhea, nausea and vomiting.   Endocrine: Negative for polydipsia, polyphagia and polyuria.   Genitourinary:  Negative for decreased urine volume, difficulty urinating and hematuria.   Musculoskeletal:  Positive for back pain.   Neurological:  Negative for dizziness, syncope and facial asymmetry.   Psychiatric/Behavioral:  Negative for agitation, behavioral problems and confusion.    Objective:     Vital Signs (Most Recent):  Temp: 97.9 °F (36.6 °C) (10/14/22 1126)  Pulse: 75 (10/14/22 1126)  Resp: 18 (10/14/22 1126)  BP: 131/63 (10/14/22 1126)  SpO2: (!) 94 % (10/14/22 1126)   Vital Signs (24h Range):  Temp:  [97.4 °F (36.3 °C)-97.9 °F (36.6 °C)] 97.9 °F (36.6 °C)  Pulse:  [58-76] 75  Resp:  [12-18] 18  SpO2:  [94 %-97 %] 94 %  BP: (131-164)/(63-77) 131/63     Weight: 74.6 kg (164 lb 7.4 oz)  Body mass index is 21.12 kg/m².    Intake/Output Summary (Last 24 hours) at 10/14/2022 1405  Last data filed at 10/14/2022 0800  Gross per 24 hour   Intake --   Output 800 ml   Net -800 ml      Physical Exam  Constitutional:       General: He is not in acute distress.     Appearance: He is normal weight. He is not ill-appearing or diaphoretic.   HENT:      Head: Normocephalic and atraumatic.      Right Ear: External ear normal.      Left Ear: External ear normal.      Nose: Nose normal. No congestion.      Mouth/Throat:      Mouth: Mucous membranes are dry.   Eyes:      General:         Right eye: No discharge.         Left eye: No  discharge.      Conjunctiva/sclera: Conjunctivae normal.   Cardiovascular:      Rate and Rhythm: Normal rate.      Pulses: Normal pulses.      Heart sounds: Normal heart sounds.   Pulmonary:      Effort: Pulmonary effort is normal. No respiratory distress.      Breath sounds: Normal breath sounds. No stridor. No wheezing.   Chest:      Chest wall: No tenderness.   Abdominal:      General: Abdomen is flat. There is no distension.      Palpations: Abdomen is soft.      Tenderness: There is no abdominal tenderness. There is no rebound.   Musculoskeletal:      Right lower leg: No edema.      Left lower leg: No edema.   Skin:     General: Skin is warm and dry.      Coloration: Skin is not jaundiced.      Findings: No rash.   Neurological:      Mental Status: He is alert and oriented to person, place, and time. Mental status is at baseline.   Psychiatric:         Mood and Affect: Mood normal.         Behavior: Behavior normal.       Significant Labs: All pertinent labs within the past 24 hours have been reviewed.  CBC:   Recent Labs   Lab 10/12/22  1716 10/13/22  0413 10/14/22  0442   WBC 7.70 7.48 7.02   HGB 12.8* 11.4* 12.1*   HCT 39.3* 35.4* 36.8*    225 224     CMP:   Recent Labs   Lab 10/12/22  1716 10/12/22  2239 10/14/22  0442   * 137 138   K 4.2 4.1 4.0    106 104   CO2 22* 26 25   * 304* 162*   BUN 23 19 16   CREATININE 1.6* 1.3 1.0   CALCIUM 8.5* 8.4* 8.6*   PROT 6.3  --  5.9*   ALBUMIN 2.5*  --  2.5*   BILITOT 0.3  --  0.3   ALKPHOS 139*  --  137*   AST 44*  --  24   ALT 24  --  20   ANIONGAP 9 5* 9     POCT Glucose:   Recent Labs   Lab 10/14/22  0759 10/14/22  1125 10/14/22  1526   POCTGLUCOSE 236* 276* 136*       Significant Imaging: I have reviewed all pertinent imaging results/findings within the past 24 hours.

## 2022-10-14 NOTE — NURSING
Pt AAOX4. No distress noted. Bed in lowest position. Side rails up x3. Call bell and personal belongs within reach. Condom Cath in place. Telemetry maintained. Safety precautions maintained. Pt free of falls or injuries. Will continue to monitor.

## 2022-10-14 NOTE — PLAN OF CARE
Problem: Occupational Therapy  Goal: Occupational Therapy Goal  Description: Goals to be met by: 10/31     Patient will increase functional independence with ADLs by performing:    UE Dressing with Minimal Assistance.  LE Dressing with Moderate Assistance.  Grooming while seated with Stand-by Assistance.  Toileting from bedside commode with Minimal Assistance for hygiene and clothing management.   Sitting at edge of bed x15 minutes with Stand-by Assistance.  Rolling to Bilateral with Supervision.   Supine to sit with Moderate Assistance.  Step transfer with Moderate Assistance    Outcome: Ongoing, Progressing     OT eval completed.

## 2022-10-14 NOTE — PROGRESS NOTES
[facility]  Progress Note    Patient Name: Kamar Muñoz  MRN: [id number]  Patient Class: OP- Observation   Admission Date: 10/12/2022  Length of Stay: 0 days  Attending Physician: Ellie Kingsley DO  Primary Care Provider: Basim Guerrero MD        Subjective:     Principal Problem:Type 2 diabetes mellitus with hyperglycemia, with long-term current use of insulin        HPI:  77 year-old M w hx of T2DM poorly controlled on insulin, paroxysmal atrial fib, seizures, HLD, diabetic peripheral neuropathy, recent ICU admission for DKA, UTI was discharged home on 10/12 presenting same day after doing fingerstick glucose check with high sugars. Patient notes that he did not eat anything all day after being discharged and did not get a chance to take any of his medications. Patient is presenting from [facility] in generally same state of health as when he was discharged. Patient notes that his wife recently passed away, she was also living at [facility]. Per last admission note, there was concern that patient was not taking care of himself, possible decreased PO intake. He notes that he does have neuropathy from diabetes for which he takes gabapentin. He is hungry and thirsty from not eating today. Patient also complains of left sided muscular back pain that is worse/aggravated with movement.    Of note, on patients previous admission he did require ICU level care with pressor requirements while he was treated for DKA and UTI.    ED course:  Patient presents to ED found to have glucose greater than 500 on POCT. Found to have BRENDAN with creatinine of 1.6 up from previous baseline of 0.8. Given 1L of fluids and 6 units of insulin. Patients glucose improved down to 290 with just 6 units of insulin. Lactic acid of 2.2. U/A with increase WBC count but rare bacteria. Patient is afebrile, however initially presented with hypotension with systolic of 93/54.  Patient admitted to  hospital medicine for hyperglycemia and Acute kidney injury.      Overview/Hospital Course:  Patient admitted for elevated glucose of > 500, found to have BRENDAN. Given fluids and started on insulin regimen. Gradual improvement in BGL with adjustments in insulin regimen: currently on Detemir 6u BID, aspart 15 TIDWM, MDSSI. Will ctm patient on current regimen to insure appropriate glucose control and anticipate discharge to NH tomorrow.       Interval History: NAEON. Fasting  this morning. Patient reporting chronic back pain, otherwise denies any acute complaints. Tolerating PO.     Review of Systems   Constitutional:  Positive for fatigue. Negative for fever.   HENT:  Negative for sore throat and trouble swallowing.    Eyes:  Negative for pain and visual disturbance.   Respiratory:  Negative for shortness of breath.    Cardiovascular:  Negative for chest pain and leg swelling.   Gastrointestinal:  Negative for abdominal pain, diarrhea, nausea and vomiting.   Endocrine: Negative for polydipsia, polyphagia and polyuria.   Genitourinary:  Negative for decreased urine volume, difficulty urinating and hematuria.   Musculoskeletal:  Positive for back pain.   Neurological:  Negative for dizziness, syncope and facial asymmetry.   Psychiatric/Behavioral:  Negative for agitation, behavioral problems and confusion.    Objective:     Vital Signs (Most Recent):  Temp: 97.9 °F (36.6 °C) (10/14/22 1126)  Pulse: 75 (10/14/22 1126)  Resp: 18 (10/14/22 1126)  BP: 131/63 (10/14/22 1126)  SpO2: (!) 94 % (10/14/22 1126)   Vital Signs (24h Range):  Temp:  [97.4 °F (36.3 °C)-97.9 °F (36.6 °C)] 97.9 °F (36.6 °C)  Pulse:  [58-76] 75  Resp:  [12-18] 18  SpO2:  [94 %-97 %] 94 %  BP: (131-164)/(63-77) 131/63     Weight: 74.6 kg (164 lb 7.4 oz)  Body mass index is 21.12 kg/m².    Intake/Output Summary (Last 24 hours) at 10/14/2022 1405  Last data filed at 10/14/2022 0800  Gross per 24 hour   Intake --   Output 800 ml   Net -800 ml       Physical Exam  Constitutional:       General: He is not in acute distress.     Appearance: He is normal weight. He is not ill-appearing or diaphoretic.   HENT:      Head: Normocephalic and atraumatic.      Right Ear: External ear normal.      Left Ear: External ear normal.      Nose: Nose normal. No congestion.      Mouth/Throat:      Mouth: Mucous membranes are dry.   Eyes:      General:         Right eye: No discharge.         Left eye: No discharge.      Conjunctiva/sclera: Conjunctivae normal.   Cardiovascular:      Rate and Rhythm: Normal rate.      Pulses: Normal pulses.      Heart sounds: Normal heart sounds.   Pulmonary:      Effort: Pulmonary effort is normal. No respiratory distress.      Breath sounds: Normal breath sounds. No stridor. No wheezing.   Chest:      Chest wall: No tenderness.   Abdominal:      General: Abdomen is flat. There is no distension.      Palpations: Abdomen is soft.      Tenderness: There is no abdominal tenderness. There is no rebound.   Musculoskeletal:      Right lower leg: No edema.      Left lower leg: No edema.   Skin:     General: Skin is warm and dry.      Coloration: Skin is not jaundiced.      Findings: No rash.   Neurological:      Mental Status: He is alert and oriented to person, place, and time. Mental status is at baseline.   Psychiatric:         Mood and Affect: Mood normal.         Behavior: Behavior normal.       Significant Labs: All pertinent labs within the past 24 hours have been reviewed.  CBC:   Recent Labs   Lab 10/12/22  1716 10/13/22  0413 10/14/22  0442   WBC 7.70 7.48 7.02   HGB 12.8* 11.4* 12.1*   HCT 39.3* 35.4* 36.8*    225 224     CMP:   Recent Labs   Lab 10/12/22  1716 10/12/22  2239 10/14/22  0442   * 137 138   K 4.2 4.1 4.0    106 104   CO2 22* 26 25   * 304* 162*   BUN 23 19 16   CREATININE 1.6* 1.3 1.0   CALCIUM 8.5* 8.4* 8.6*   PROT 6.3  --  5.9*   ALBUMIN 2.5*  --  2.5*   BILITOT 0.3  --  0.3   ALKPHOS 139*  --   137*   AST 44*  --  24   ALT 24  --  20   ANIONGAP 9 5* 9     POCT Glucose:   Recent Labs   Lab 10/14/22  0759 10/14/22  1125 10/14/22  1526   POCTGLUCOSE 236* 276* 136*       Significant Imaging: I have reviewed all pertinent imaging results/findings within the past 24 hours.      Assessment/Plan:      * Type 2 diabetes mellitus with hyperglycemia, with long-term current use of insulin  77 year old M w recent admission for DKA presenting with hyperglycemia likely HHS causing volume depletion and acute kidney injruy. Patient responded well with 6 units of regular insulin in the ED going from sugar of 490s down to 290. Will resume his subq insulin without having to do insulin gtt given improvement.    Patient's FSGs are uncontrolled due to hyperglycemia on current medication regimen.  Last A1c reviewed-   Lab Results   Component Value Date    HGBA1C 10.6 (H) 10/05/2022     Most recent fingerstick glucose reviewed-   Recent Labs   Lab 10/13/22  2010 10/14/22  0759 10/14/22  1125 10/14/22  1526   POCTGLUCOSE 233* 236* 276* 136*     Current correctional scale  Medium  Maintain anti-hyperglycemic dose as follows-   Antihyperglycemics (From admission, onward)    Start     Stop Route Frequency Ordered    10/14/22 1130  insulin aspart U-100 pen 15 Units         -- SubQ 3 times daily with meals 10/14/22 1038    10/13/22 0900  insulin detemir U-100 pen 6 Units         -- SubQ 2 times daily 10/12/22 2244    10/12/22 2343  insulin aspart U-100 pen 1-10 Units         -- SubQ Before meals & nightly PRN 10/12/22 2244        Hold Oral hypoglycemics while patient is in the hospital.  Current regimen: Detemir 6u BID; Aspart 15u TIDWM; MDSSI  BGL controlled on current regimen    History of UTI  Patient with recent admission with UTI. Ucx showed no predominant organisms. Switched from zosyn to augmentin during previous admission. Patient on this admission with urine microscopy with many WBC, 3+ leukocytes, but few bacteria. No symptoms  "of UTI on exam.     Plan:  --hold off on abx    Hypotension due to hypovolemia  Patient presented with blood pressure of 93/54 at ED, likely was volume depleted from glucosuria. Responded well to 1L of IV fluids. Of note patient has required pressors in past admission due to sepsis from UTI and DKA.    -monitor vitals q4h  -hold anti-hypertensives  -fluid boluses cautiously given heart failure hx      Goals of care, counseling/discussion  Patient would like to remain DNR status after explaining it to him. He wants to keep it the same.    Chronic anticoagulation  See atrial fib      History of partial seizures  history of focal seizures  -Continue home Lacosamide      Stented coronary artery  -Hx of CAD s/p placement of 2 LAUREN in RCA in 01/09/2022.   -Patient denied chest pain on admission, troponin WNL  -EKG on admission(10/05) Sinus rhythm with 1st degree A-V block  -Per chart review, "ASA discontinued 4/13 due to bleeding risk with triple therapy; continuing Plavix and Eliquis" "No need for triple therapy (ASA/Plavix/eliquis) beyond 1 month"     PLAN  -Continue home statin  -Continue home apixaban, clopidogrel   -resumed home losartan; holding metoprolol     Paroxysmal atrial fibrillation  Patient with Paroxysmal (<7 days) atrial fibrillation which is controlled currently with Amiodarone. Patient is currently in sinus rhythm.[id number] Score: 4.  Anticoagulation indicated. Anticoagulation done with eliquis.  -- rate controlled while admitted       Coronary artery disease involving native coronary artery of native heart with unstable angina pectoris  Continue plavix      BRENDAN (acute kidney injury)  Patient with acute kidney injury likely due to IVVD/dehydration BRENDAN is currently to be determined on next CMP. Labs reviewed- Renal function/electrolytes with Estimated Creatinine Clearance: 63.2 mL/min (based on SCr of 1 mg/dL). according to latest data. Monitor urine output and serial BMP and adjust therapy as needed. " Avoid nephrotoxins and renally dose meds for GFR listed above. Recent creatinine level prior to today was 0.8.    DDX: prerenal from volume depletion 2/2 glucosuria, obstruction, ischemic ATN from hypotension    Plan:  --avoid nephrotoxic agents  --avoid NSAIDs  --renally dose medications  --encourage PO intake  -- Cr at baseline on 10/14      Essential hypertension  Will hold anti-hypertensive medication at this time given patient hypotensive on presentation to 93/54. Patient on losartan, will need to also hold for BRENDAN.    -- Resumed home losartan 25mg qd on 10/14 upon resolution of BRENDAN        VTE Risk Mitigation (From admission, onward)         Ordered     apixaban tablet 5 mg  2 times daily         10/12/22 2244     IP VTE HIGH RISK PATIENT  Once         10/12/22 2226     Place sequential compression device  Until discontinued         10/12/22 2226                Discharge Planning   ALLIE: 10/15/2022     Code Status: DNR   Is the patient medically ready for discharge?: No    Reason for patient still in hospital (select all that apply): Treatment                     Stewart Chan MD   Internal Medicine PGY-2  Department of [facility]   [address] - Observation

## 2022-10-14 NOTE — PLAN OF CARE
JASIEL sent a Return to NH referral with updates to [facility].     JASIEL will follow.    SALVATORE Adames LMSW  [facility]  [id number]

## 2022-10-15 VITALS
HEART RATE: 79 BPM | TEMPERATURE: 98 F | BODY MASS INDEX: 21.1 KG/M2 | SYSTOLIC BLOOD PRESSURE: 110 MMHG | DIASTOLIC BLOOD PRESSURE: 57 MMHG | WEIGHT: 164.44 LBS | RESPIRATION RATE: 18 BRPM | OXYGEN SATURATION: 93 % | HEIGHT: 74 IN

## 2022-10-15 LAB
ALBUMIN SERPL BCP-MCNC: 2.4 G/DL (ref 3.5–5.2)
ALP SERPL-CCNC: 132 U/L (ref 55–135)
ALT SERPL W/O P-5'-P-CCNC: 19 U/L (ref 10–44)
ANION GAP SERPL CALC-SCNC: 8 MMOL/L (ref 8–16)
AST SERPL-CCNC: 25 U/L (ref 10–40)
BASOPHILS # BLD AUTO: 0.03 K/UL (ref 0–0.2)
BASOPHILS NFR BLD: 0.5 % (ref 0–1.9)
BILIRUB SERPL-MCNC: 0.4 MG/DL (ref 0.1–1)
BUN SERPL-MCNC: 18 MG/DL (ref 8–23)
CALCIUM SERPL-MCNC: 8.3 MG/DL (ref 8.7–10.5)
CHLORIDE SERPL-SCNC: 101 MMOL/L (ref 95–110)
CO2 SERPL-SCNC: 23 MMOL/L (ref 23–29)
CREAT SERPL-MCNC: 1.1 MG/DL (ref 0.5–1.4)
DIFFERENTIAL METHOD: ABNORMAL
EOSINOPHIL # BLD AUTO: 0.5 K/UL (ref 0–0.5)
EOSINOPHIL NFR BLD: 7.8 % (ref 0–8)
ERYTHROCYTE [DISTWIDTH] IN BLOOD BY AUTOMATED COUNT: 11.3 % (ref 11.5–14.5)
EST. GFR  (NO RACE VARIABLE): >60 ML/MIN/1.73 M^2
GLUCOSE SERPL-MCNC: 225 MG/DL (ref 70–110)
HCT VFR BLD AUTO: 35.7 % (ref 40–54)
HGB BLD-MCNC: 11.6 G/DL (ref 14–18)
IMM GRANULOCYTES # BLD AUTO: 0.02 K/UL (ref 0–0.04)
IMM GRANULOCYTES NFR BLD AUTO: 0.3 % (ref 0–0.5)
LYMPHOCYTES # BLD AUTO: 1.9 K/UL (ref 1–4.8)
LYMPHOCYTES NFR BLD: 29.2 % (ref 18–48)
MAGNESIUM SERPL-MCNC: 1.4 MG/DL (ref 1.6–2.6)
MCH RBC QN AUTO: 29.1 PG (ref 27–31)
MCHC RBC AUTO-ENTMCNC: 32.5 G/DL (ref 32–36)
MCV RBC AUTO: 90 FL (ref 82–98)
MONOCYTES # BLD AUTO: 0.6 K/UL (ref 0.3–1)
MONOCYTES NFR BLD: 10 % (ref 4–15)
NEUTROPHILS # BLD AUTO: 3.4 K/UL (ref 1.8–7.7)
NEUTROPHILS NFR BLD: 52.2 % (ref 38–73)
NRBC BLD-RTO: 0 /100 WBC
PHOSPHATE SERPL-MCNC: 2.8 MG/DL (ref 2.7–4.5)
PLATELET # BLD AUTO: 262 K/UL (ref 150–450)
PMV BLD AUTO: 9.2 FL (ref 9.2–12.9)
POCT GLUCOSE: 187 MG/DL (ref 70–110)
POCT GLUCOSE: 260 MG/DL (ref 70–110)
POCT GLUCOSE: 260 MG/DL (ref 70–110)
POTASSIUM SERPL-SCNC: 3.9 MMOL/L (ref 3.5–5.1)
PROT SERPL-MCNC: 5.6 G/DL (ref 6–8.4)
RBC # BLD AUTO: 3.98 M/UL (ref 4.6–6.2)
SARS-COV-2 RNA RESP QL NAA+PROBE: NOT DETECTED
SODIUM SERPL-SCNC: 132 MMOL/L (ref 136–145)
WBC # BLD AUTO: 6.43 K/UL (ref 3.9–12.7)

## 2022-10-15 PROCEDURE — 99217 PR OBSERVATION CARE DISCHARGE: ICD-10-PCS | Mod: GC,,, | Performed by: STUDENT IN AN ORGANIZED HEALTH CARE EDUCATION/TRAINING PROGRAM

## 2022-10-15 PROCEDURE — 96366 THER/PROPH/DIAG IV INF ADDON: CPT

## 2022-10-15 PROCEDURE — 63600175 PHARM REV CODE 636 W HCPCS: Performed by: STUDENT IN AN ORGANIZED HEALTH CARE EDUCATION/TRAINING PROGRAM

## 2022-10-15 PROCEDURE — 82962 GLUCOSE BLOOD TEST: CPT | Mod: 91

## 2022-10-15 PROCEDURE — 96365 THER/PROPH/DIAG IV INF INIT: CPT

## 2022-10-15 PROCEDURE — 84100 ASSAY OF PHOSPHORUS: CPT

## 2022-10-15 PROCEDURE — 85025 COMPLETE CBC W/AUTO DIFF WBC: CPT

## 2022-10-15 PROCEDURE — 80053 COMPREHEN METABOLIC PANEL: CPT

## 2022-10-15 PROCEDURE — G0378 HOSPITAL OBSERVATION PER HR: HCPCS

## 2022-10-15 PROCEDURE — 25000003 PHARM REV CODE 250

## 2022-10-15 PROCEDURE — 83735 ASSAY OF MAGNESIUM: CPT

## 2022-10-15 PROCEDURE — 36415 COLL VENOUS BLD VENIPUNCTURE: CPT

## 2022-10-15 PROCEDURE — 99217 PR OBSERVATION CARE DISCHARGE: CPT | Mod: GC,,, | Performed by: STUDENT IN AN ORGANIZED HEALTH CARE EDUCATION/TRAINING PROGRAM

## 2022-10-15 RX ORDER — MAGNESIUM SULFATE HEPTAHYDRATE 40 MG/ML
2 INJECTION, SOLUTION INTRAVENOUS ONCE
Status: COMPLETED | OUTPATIENT
Start: 2022-10-15 | End: 2022-10-15

## 2022-10-15 RX ADMIN — LOSARTAN POTASSIUM 25 MG: 25 TABLET, FILM COATED ORAL at 09:10

## 2022-10-15 RX ADMIN — LACOSAMIDE 100 MG: 50 TABLET, FILM COATED ORAL at 09:10

## 2022-10-15 RX ADMIN — AMIODARONE HYDROCHLORIDE 200 MG: 200 TABLET ORAL at 09:10

## 2022-10-15 RX ADMIN — INSULIN ASPART 6 UNITS: 100 INJECTION, SOLUTION INTRAVENOUS; SUBCUTANEOUS at 09:10

## 2022-10-15 RX ADMIN — MAGNESIUM SULFATE 2 G: 2 INJECTION INTRAVENOUS at 09:10

## 2022-10-15 RX ADMIN — INSULIN DETEMIR 6 UNITS: 100 INJECTION, SOLUTION SUBCUTANEOUS at 09:10

## 2022-10-15 RX ADMIN — ATORVASTATIN CALCIUM 40 MG: 40 TABLET, FILM COATED ORAL at 09:10

## 2022-10-15 RX ADMIN — GABAPENTIN 100 MG: 100 CAPSULE ORAL at 09:10

## 2022-10-15 RX ADMIN — CLOPIDOGREL 75 MG: 75 TABLET, FILM COATED ORAL at 09:10

## 2022-10-15 RX ADMIN — INSULIN ASPART 15 UNITS: 100 INJECTION, SOLUTION INTRAVENOUS; SUBCUTANEOUS at 09:10

## 2022-10-15 RX ADMIN — APIXABAN 5 MG: 5 TABLET, FILM COATED ORAL at 09:10

## 2022-10-15 RX ADMIN — PANTOPRAZOLE SODIUM 40 MG: 40 TABLET, DELAYED RELEASE ORAL at 09:10

## 2022-10-15 NOTE — PLAN OF CARE
Problem: Adult Inpatient Plan of Care  Goal: Optimal Comfort and Wellbeing  Outcome: Ongoing, Progressing     Problem: Fall Injury Risk  Goal: Absence of Fall and Fall-Related Injury  Outcome: Ongoing, Progressing     Problem: Oral Intake Inadequate (Acute Kidney Injury/Impairment)  Goal: Optimal Nutrition Intake  Outcome: Ongoing, Progressing

## 2022-10-15 NOTE — DISCHARGE SUMMARY
[facility] - Observation  [facility]  Discharge Summary      Patient Name: Kamar Muñoz  MRN: [id number]  Patient Class: OP- Observation  Admission Date: 10/12/2022  Hospital Length of Stay: 0 days  Discharge Date and Time:  10/15/2022 11:03 AM  Attending Physician: Ellie Kingsley DO   Discharging Provider: Sergey Rodriguez MD  Primary Care Provider: Basim Guerrero MD  Hospital Medicine Team: [facility] HOSP MED 5 Sergey Rodriguez MD    HPI:   77 year-old M w hx of T2DM poorly controlled on insulin, paroxysmal atrial fib, seizures, HLD, diabetic peripheral neuropathy, recent ICU admission for DKA, UTI was discharged home on 10/12 presenting same day after doing fingerstick glucose check with high sugars. Patient notes that he did not eat anything all day after being discharged and did not get a chance to take any of his medications. Patient is presenting from [facility] in generally same state of health as when he was discharged. Patient notes that his wife recently passed away, she was also living at [facility] Per last admission note, there was concern that patient was not taking care of himself, possible decreased PO intake. He notes that he does have neuropathy from diabetes for which he takes gabapentin. He is hungry and thirsty from not eating today. Patient also complains of left sided muscular back pain that is worse/aggravated with movement.    Of note, on patients previous admission he did require ICU level care with pressor requirements while he was treated for DKA and UTI.    ED course:  Patient presents to ED found to have glucose greater than 500 on POCT. Found to have BRENDAN with creatinine of 1.6 up from previous baseline of 0.8. Given 1L of fluids and 6 units of insulin. Patients glucose improved down to 290 with just 6 units of insulin. Lactic acid of 2.2. U/A with increase WBC count but rare bacteria. Patient is afebrile, however initially presented with hypotension with systolic  of 93/54.  Patient admitted to hospital medicine for hyperglycemia and Acute kidney injury.      Hospital Course:   Patient admitted for elevated glucose of > 500, found to have BRENDAN. Given fluids and started on insulin regimen. Gradual improvement in BGL with adjustments in insulin regimen: currently on Detemir 6u BID, aspart 15 TIDWM, MDSSI. Patient set up for outpatient endocrinology and PCP follow up. Home BB held due to low HR.          Goals of Care Treatment Preferences:  Code Status: DNR      Consults:   Consults (From admission, onward)        Status Ordering Provider     Inpatient consult to Registered Dietitian/Nutritionist  Once        Provider:  (Not yet assigned)    Completed MEKA BEATTY        Vitals:    10/15/22 0309 10/15/22 0354 10/15/22 0624 10/15/22 0730   BP:  (!) 146/72  (!) 166/83   BP Location:  Right arm  Right arm   Patient Position:  Lying  Lying   Pulse: 71 62  65   Resp:    18   Temp:  97.6 °F (36.4 °C)  97.7 °F (36.5 °C)   TempSrc:  Oral  Oral   SpO2:  (!) 94% (!) 94% (!) 91%   Weight:       Height:           Physical Exam  Constitutional:       General: He is not in acute distress.     Appearance: He is normal weight. He is not ill-appearing or diaphoretic.   HENT:      Head: Normocephalic and atraumatic.      Right Ear: External ear normal.      Left Ear: External ear normal.      Nose: Nose normal. No congestion.      Mouth/Throat:      Mouth: Mucous membranes are dry.   Eyes:      General:         Right eye: No discharge.         Left eye: No discharge.      Conjunctiva/sclera: Conjunctivae normal.   Cardiovascular:      Rate and Rhythm: Normal rate.      Pulses: Normal pulses.      Heart sounds: Normal heart sounds.   Pulmonary:      Effort: Pulmonary effort is normal. No respiratory distress.      Breath sounds: Normal breath sounds. No stridor. No wheezing.   Chest:      Chest wall: No tenderness.   Abdominal:      General: Abdomen is flat. There is no distension.       Palpations: Abdomen is soft.      Tenderness: There is no abdominal tenderness. There is no rebound.   Musculoskeletal:      Right lower leg: No edema.      Left lower leg: No edema.   Skin:     General: Skin is warm and dry.      Coloration: Skin is not jaundiced.      Findings: No rash.   Neurological:      Mental Status: He is alert and oriented to person, place, and time. Mental status is at baseline.   Psychiatric:         Mood and Affect: Mood normal.         Behavior: Behavior normal.         Final Active Diagnoses:    Diagnosis Date Noted POA    PRINCIPAL PROBLEM:  Type 2 diabetes mellitus with hyperglycemia, with long-term current use of insulin [E11.65, Z79.4] 03/08/2013 Not Applicable     Chronic    History of UTI [Z87.440] 10/13/2022 Unknown    Hypotension due to hypovolemia [I95.89, E86.1] 10/12/2022 Unknown    Goals of care, counseling/discussion [Z71.89] 04/12/2022 Not Applicable    Chronic anticoagulation [Z79.01] 04/05/2022 Not Applicable    History of partial seizures [Z86.69] 01/26/2022 Not Applicable    Stented coronary artery [Z95.5] 01/19/2022 Not Applicable    Paroxysmal atrial fibrillation [I48.0] 01/12/2022 Yes     Chronic    Coronary artery disease involving native coronary artery of native heart with unstable angina pectoris [I25.110] 01/09/2022 Yes     Chronic    BRENDAN (acute kidney injury) [N17.9] 08/30/2016 Unknown    Essential hypertension [I10] 07/20/2012 Yes     Chronic      Problems Resolved During this Admission:    Diagnosis Date Noted Date Resolved POA    History of ST elevation myocardial infarction (STEMI) [I25.2] 01/19/2022 10/12/2022 Not Applicable     Chronic    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 Schedule an appointment as soon as possible for a visit in 1 week(s).    Specialty: Family  Medicine  Contact information:  [address] [facility]  [place] [address]  [phone]             [facility] - [facility] 6th Fl. Schedule an appointment as soon as possible for a visit in 2 week(s).    Specialty: Endocrinology  Contact information:  [address]  [facility] [address] [phone]  Additional information:  [facility] - Main Building, 6th Floor   Please park in [facility] and take Clinic elevator                     Patient Instructions:      Ambulatory referral/consult to Internal Medicine   Standing Status: Future   Referral Priority: Routine Referral Type: Consultation   Referral Reason: Specialty Services Required   Requested Specialty: Internal Medicine   Number of Visits Requested: 1     Ambulatory referral/consult to Endocrinology   Standing Status: Future   Referral Priority: Routine Referral Type: Consultation   Requested Specialty: Endocrinology   Number of Visits Requested: 1       Significant Diagnostic Studies: Labs:   CMP   Recent Labs   Lab 10/14/22  0442 10/15/22  0350    132*   K 4.0 3.9    101   CO2 25 23   * 225*   BUN 16 18   CREATININE 1.0 1.1   CALCIUM 8.6* 8.3*   PROT 5.9* 5.6*   ALBUMIN 2.5* 2.4*   BILITOT 0.3 0.4   ALKPHOS 137* 132   AST 24 25   ALT 20 19   ANIONGAP 9 8    and CBC   Recent Labs   Lab 10/14/22  0442 10/15/22  0350   WBC 7.02 6.43   HGB 12.1* 11.6*   HCT 36.8* 35.7*    262       Pending Diagnostic Studies:     None         Medications:  Reconciled Home Medications:      Medication List      CHANGE how you take these medications    * insulin aspart U-100 100 unit/mL (3 mL) Inpn pen  Commonly known as: NovoLOG  Inject 1-10 Units into the skin before meals and at bedtime as needed (Hyperglycemia).  What changed:   · how much to take  · when to take this  · reasons to take this     * insulin aspart U-100 100 unit/mL (3 mL) Inpn pen  Commonly known as: NovoLOG  Inject 15 Units into the skin  3 (three) times daily with meals.  What changed:   · how much to take  · when to take this     methocarbamoL 500 MG Tab  Commonly known as: ROBAXIN  Take 1 tablet (500 mg total) by mouth nightly as needed.  What changed:   · when to take this  · reasons to take this         * This list has 2 medication(s) that are the same as other medications prescribed for you. Read the directions carefully, and ask your doctor or other care provider to review them with you.            CONTINUE taking these medications    amiodarone 200 MG Tab  Commonly known as: PACERONE  Take 1 tablet (200 mg total) by mouth once daily.     atorvastatin 40 MG tablet  Commonly known as: LIPITOR  Take 1 tablet (40 mg total) by mouth once daily.     blood sugar diagnostic Strp  1 strip by Misc.(Non-Drug; Combo Route) route 2 (two) times a day.     cetirizine 10 MG tablet  Commonly known as: ZYRTEC  Take 1 tablet (10 mg total) by mouth every evening.     clopidogreL 75 mg tablet  Commonly known as: PLAVIX  Take 75 mg by mouth once daily.     diclofenac sodium 1 % Gel  Commonly known as: VOLTAREN  Apply 4 g topically 3 (three) times daily. Apply to sacrun closed skin/buttocks     docusate sodium 100 MG capsule  Commonly known as: COLACE  Take 100 mg by mouth once daily at 6am.     ELIQUIS 5 mg Tab  Generic drug: apixaban  Take 1 tablet (5 mg total) by mouth 2 (two) times daily.     ergocalciferol 50,000 unit Cap  Commonly known as: ERGOCALCIFEROL  Take 1 capsule (50,000 Units total) by mouth every 7 days.     gabapentin 100 MG capsule  Commonly known as: NEURONTIN  Take 1 capsule (100 mg total) by mouth 2 (two) times daily.     * glucose 4 GM chewable tablet  Take 4 tablets (16 g total) by mouth as needed for Low blood sugar (50 - 70).     * glucose 4 GM chewable tablet  Take 6 tablets (24 g total) by mouth as needed for Low blood sugar (< 50).     insulin detemir U-100 100 unit/mL (3 mL) Inpn pen  Commonly known as: Levemir FLEXTOUCH  Inject 6 Units  "into the skin 2 (two) times daily.     lacosamide 100 mg Tab  Commonly known as: VIMPAT  Take 1 tablet (100 mg total) by mouth every 12 (twelve) hours.     lancets Misc  Commonly known as: ONETOUCH ULTRASOFT LANCETS  1 lancet by Misc.(Non-Drug; Combo Route) route 2 (two) times a day.     losartan 25 MG tablet  Commonly known as: COZAAR  Take 25 mg by mouth once daily.     METAMUCIL (WITH SUGAR) 3.4 gram packet  Generic drug: psyllium husk (with sugar)  Take 1 packet by mouth 2 (two) times daily.     MULTIVITAMIN ORAL  Take 1 tablet by mouth once daily.     nitroGLYCERIN 0.4 MG SL tablet  Commonly known as: NITROSTAT  Place 1 tablet (0.4 mg total) under the tongue every 5 (five) minutes as needed for Chest pain.     pantoprazole 40 MG tablet  Commonly known as: PROTONIX  Take 1 tablet (40 mg total) by mouth once daily.     * pen needle, diabetic 30 gauge x 5/16" Ndle  Commonly known as: PEN NEEDLE  1 Units by Misc.(Non-Drug; Combo Route) route 3 (three) times daily.     * BD ULTRA-FINE SHORT PEN NEEDLE 31 gauge x 5/16" Ndle  Generic drug: pen needle, diabetic  3 (three) times daily.     polycarbophil 625 mg tablet  Commonly known as: FIBERCON  Take 1 tablet by mouth once daily.     sertraline 50 MG tablet  Commonly known as: ZOLOFT  Take 50 mg by mouth once daily.     sucralfate 1 gram tablet  Commonly known as: CARAFATE  Take 1 g by mouth 3 (three) times daily before meals.     tamsulosin 0.4 mg Cap  Commonly known as: FLOMAX  Take 1 capsule (0.4 mg total) by mouth every evening.         * This list has 4 medication(s) that are the same as other medications prescribed for you. Read the directions carefully, and ask your doctor or other care provider to review them with you.            STOP taking these medications    metoprolol succinate 25 MG 24 hr tablet  Commonly known as: TOPROL-XL            Indwelling Lines/Drains at time of discharge:   Lines/Drains/Airways     Drain  Duration           Male External Urinary " Catheter 10/12/22 2049 Medium 2 days                Time spent on the discharge of patient: 40 minutes       Sergey Rodriguez MD  Internal Medicine, PGY-1  [facility]

## 2022-10-15 NOTE — NURSING
Discharge instructions reviewed with the patient.    Full report called to Orlin OLIVIER @ receiving facility @ [id number]. Supportive documentation sent with patient upon transfer.     VSS. The patient denies pain. Skin is intact, with scattered bruising as noted previously. He appears ready and appropriate for DC.     Blood sugar 260 on transfer. Reading called to receiving facility for coverage on arrival, following meal. The patient has not eaten lunch at this time.     The patient is assisted off unit, via [facility], by  arranged transport, with all belongings @ 1807.     Naomi Burnett RN

## 2022-10-15 NOTE — PLAN OF CARE
Problem: Adult Inpatient Plan of Care  Goal: Plan of Care Review  Outcome: Ongoing, Progressing  Goal: Patient-Specific Goal (Individualized)  Outcome: Ongoing, Progressing  Goal: Absence of Hospital-Acquired Illness or Injury  Outcome: Ongoing, Progressing  Goal: Optimal Comfort and Wellbeing  Outcome: Ongoing, Progressing  Goal: Readiness for Transition of Care  Outcome: Ongoing, Progressing     Problem: Diabetic Ketoacidosis  Goal: Fluid and Electrolyte Balance with Absence of Ketosis  Outcome: Ongoing, Progressing     Problem: Infection  Goal: Absence of Infection Signs and Symptoms  Outcome: Ongoing, Progressing     Problem: Fall Injury Risk  Goal: Absence of Fall and Fall-Related Injury  Outcome: Ongoing, Progressing     Problem: Skin Injury Risk Increased  Goal: Skin Health and Integrity  Outcome: Ongoing, Progressing     Problem: Diabetes Comorbidity  Goal: Blood Glucose Level Within Targeted Range  Outcome: Ongoing, Progressing     Problem: Fluid and Electrolyte Imbalance (Acute Kidney Injury/Impairment)  Goal: Fluid and Electrolyte Balance  Outcome: Ongoing, Progressing     Problem: Oral Intake Inadequate (Acute Kidney Injury/Impairment)  Goal: Optimal Nutrition Intake  Outcome: Ongoing, Progressing     Problem: Renal Function Impairment (Acute Kidney Injury/Impairment)  Goal: Effective Renal Function  Outcome: Ongoing, Progressing     Problem: Impaired Wound Healing  Goal: Optimal Wound Healing  Outcome: Ongoing, Progressing

## 2022-10-15 NOTE — PLAN OF CARE
COVID results faxed to [phone] at [facility].   Patient accepted for return today.   Ambulance stretcher requested with [facility] for 12 PM pickup time.   Report may be called to [phone]; ask for nurse on [facility].

## 2022-10-17 LAB
BACTERIA BLD CULT: NORMAL
BACTERIA BLD CULT: NORMAL

## 2022-10-24 ENCOUNTER — HOSPITAL ENCOUNTER (EMERGENCY)
Facility: HOSPITAL | Age: 77
Discharge: HOME | End: 2022-10-25
Attending: EMERGENCY MEDICINE
Payer: MEDICARE

## 2022-10-24 DIAGNOSIS — S22.080A COMPRESSION FRACTURE OF T12 VERTEBRA, INITIAL ENCOUNTER: Primary | ICD-10-CM

## 2022-10-24 DIAGNOSIS — N30.00 ACUTE CYSTITIS WITHOUT HEMATURIA: ICD-10-CM

## 2022-10-24 DIAGNOSIS — R73.9 HYPERGLYCEMIA: ICD-10-CM

## 2022-10-24 DIAGNOSIS — M54.50 ACUTE LEFT-SIDED LOW BACK PAIN WITHOUT SCIATICA: ICD-10-CM

## 2022-10-24 LAB
ALBUMIN SERPL BCP-MCNC: 2.6 G/DL (ref 3.5–5.2)
ALLENS TEST: ABNORMAL
ALP SERPL-CCNC: 170 U/L (ref 55–135)
ALT SERPL W/O P-5'-P-CCNC: 36 U/L (ref 10–44)
ANION GAP SERPL CALC-SCNC: 12 MMOL/L (ref 8–16)
AST SERPL-CCNC: 43 U/L (ref 10–40)
B-OH-BUTYR BLD STRIP-SCNC: 0.2 MMOL/L (ref 0–0.5)
BACTERIA #/AREA URNS AUTO: ABNORMAL /HPF
BASOPHILS # BLD AUTO: 0.05 K/UL (ref 0–0.2)
BASOPHILS NFR BLD: 0.9 % (ref 0–1.9)
BILIRUB SERPL-MCNC: 0.3 MG/DL (ref 0.1–1)
BILIRUB UR QL STRIP: NEGATIVE
BUN SERPL-MCNC: 30 MG/DL (ref 8–23)
CALCIUM SERPL-MCNC: 8.7 MG/DL (ref 8.7–10.5)
CHLORIDE SERPL-SCNC: 96 MMOL/L (ref 95–110)
CLARITY UR REFRACT.AUTO: ABNORMAL
CO2 SERPL-SCNC: 24 MMOL/L (ref 23–29)
COLOR UR AUTO: YELLOW
CREAT SERPL-MCNC: 1.3 MG/DL (ref 0.5–1.4)
DIFFERENTIAL METHOD: ABNORMAL
EOSINOPHIL # BLD AUTO: 0.2 K/UL (ref 0–0.5)
EOSINOPHIL NFR BLD: 3.4 % (ref 0–8)
ERYTHROCYTE [DISTWIDTH] IN BLOOD BY AUTOMATED COUNT: 11.7 % (ref 11.5–14.5)
EST. GFR  (NO RACE VARIABLE): 55.9 ML/MIN/1.73 M^2
GLUCOSE SERPL-MCNC: 317 MG/DL (ref 70–110)
GLUCOSE UR QL STRIP: ABNORMAL
HCO3 UR-SCNC: 28.3 MMOL/L (ref 24–28)
HCT VFR BLD AUTO: 36.7 % (ref 40–54)
HGB BLD-MCNC: 12 G/DL (ref 14–18)
HGB UR QL STRIP: NEGATIVE
HIV 1+2 AB+HIV1 P24 AG SERPL QL IA: NORMAL
HYALINE CASTS UR QL AUTO: 9 /LPF
IMM GRANULOCYTES # BLD AUTO: 0.01 K/UL (ref 0–0.04)
IMM GRANULOCYTES NFR BLD AUTO: 0.2 % (ref 0–0.5)
KETONES UR QL STRIP: ABNORMAL
LEUKOCYTE ESTERASE UR QL STRIP: ABNORMAL
LYMPHOCYTES # BLD AUTO: 1.3 K/UL (ref 1–4.8)
LYMPHOCYTES NFR BLD: 22.9 % (ref 18–48)
MCH RBC QN AUTO: 29.6 PG (ref 27–31)
MCHC RBC AUTO-ENTMCNC: 32.7 G/DL (ref 32–36)
MCV RBC AUTO: 90 FL (ref 82–98)
MICROSCOPIC COMMENT: ABNORMAL
MONOCYTES # BLD AUTO: 0.7 K/UL (ref 0.3–1)
MONOCYTES NFR BLD: 12.2 % (ref 4–15)
NEUTROPHILS # BLD AUTO: 3.5 K/UL (ref 1.8–7.7)
NEUTROPHILS NFR BLD: 60.4 % (ref 38–73)
NITRITE UR QL STRIP: NEGATIVE
NRBC BLD-RTO: 0 /100 WBC
PCO2 BLDA: 50.1 MMHG (ref 35–45)
PH SMN: 7.36 [PH] (ref 7.35–7.45)
PH UR STRIP: 5 [PH] (ref 5–8)
PLATELET # BLD AUTO: 242 K/UL (ref 150–450)
PMV BLD AUTO: 10 FL (ref 9.2–12.9)
PO2 BLDA: 30 MMHG (ref 40–60)
POC BE: 3 MMOL/L
POC SATURATED O2: 54 % (ref 95–100)
POC TCO2: 30 MMOL/L (ref 24–29)
POCT GLUCOSE: 298 MG/DL (ref 70–110)
POCT GLUCOSE: 351 MG/DL (ref 70–110)
POTASSIUM SERPL-SCNC: 4.3 MMOL/L (ref 3.5–5.1)
PROT SERPL-MCNC: 6 G/DL (ref 6–8.4)
PROT UR QL STRIP: ABNORMAL
RBC # BLD AUTO: 4.06 M/UL (ref 4.6–6.2)
RBC #/AREA URNS AUTO: 3 /HPF (ref 0–4)
SAMPLE: ABNORMAL
SITE: ABNORMAL
SODIUM SERPL-SCNC: 132 MMOL/L (ref 136–145)
SP GR UR STRIP: 1.02 (ref 1–1.03)
URN SPEC COLLECT METH UR: ABNORMAL
WBC # BLD AUTO: 5.82 K/UL (ref 3.9–12.7)
WBC #/AREA URNS AUTO: >100 /HPF (ref 0–5)
WBC CLUMPS UR QL AUTO: ABNORMAL
YEAST UR QL AUTO: ABNORMAL

## 2022-10-24 PROCEDURE — 93010 EKG 12-LEAD: ICD-10-PCS | Mod: ,,, | Performed by: INTERNAL MEDICINE

## 2022-10-24 PROCEDURE — 82962 GLUCOSE BLOOD TEST: CPT

## 2022-10-24 PROCEDURE — 87086 URINE CULTURE/COLONY COUNT: CPT | Performed by: PHYSICIAN ASSISTANT

## 2022-10-24 PROCEDURE — 99285 EMERGENCY DEPT VISIT HI MDM: CPT | Mod: ,,, | Performed by: PHYSICIAN ASSISTANT

## 2022-10-24 PROCEDURE — 99285 EMERGENCY DEPT VISIT HI MDM: CPT | Mod: 25

## 2022-10-24 PROCEDURE — 82010 KETONE BODYS QUAN: CPT | Performed by: PHYSICIAN ASSISTANT

## 2022-10-24 PROCEDURE — 87389 HIV-1 AG W/HIV-1&-2 AB AG IA: CPT | Performed by: PHYSICIAN ASSISTANT

## 2022-10-24 PROCEDURE — 81001 URINALYSIS AUTO W/SCOPE: CPT | Performed by: PHYSICIAN ASSISTANT

## 2022-10-24 PROCEDURE — 85025 COMPLETE CBC W/AUTO DIFF WBC: CPT | Performed by: PHYSICIAN ASSISTANT

## 2022-10-24 PROCEDURE — 25000003 PHARM REV CODE 250: Performed by: PHYSICIAN ASSISTANT

## 2022-10-24 PROCEDURE — 99285 PR EMERGENCY DEPT VISIT,LEVEL V: ICD-10-PCS | Mod: ,,, | Performed by: PHYSICIAN ASSISTANT

## 2022-10-24 PROCEDURE — 94761 N-INVAS EAR/PLS OXIMETRY MLT: CPT

## 2022-10-24 PROCEDURE — 93010 ELECTROCARDIOGRAM REPORT: CPT | Mod: ,,, | Performed by: INTERNAL MEDICINE

## 2022-10-24 PROCEDURE — 99900035 HC TECH TIME PER 15 MIN (STAT)

## 2022-10-24 PROCEDURE — 80053 COMPREHEN METABOLIC PANEL: CPT | Performed by: PHYSICIAN ASSISTANT

## 2022-10-24 PROCEDURE — P9612 CATHETERIZE FOR URINE SPEC: HCPCS

## 2022-10-24 PROCEDURE — 82803 BLOOD GASES ANY COMBINATION: CPT

## 2022-10-24 PROCEDURE — 25000003 PHARM REV CODE 250: Performed by: EMERGENCY MEDICINE

## 2022-10-24 PROCEDURE — 93005 ELECTROCARDIOGRAM TRACING: CPT

## 2022-10-24 RX ORDER — METHOCARBAMOL 500 MG/1
500 TABLET, FILM COATED ORAL
Status: COMPLETED | OUTPATIENT
Start: 2022-10-24 | End: 2022-10-24

## 2022-10-24 RX ORDER — CEFPODOXIME PROXETIL 100 MG/1
100 TABLET, FILM COATED ORAL 2 TIMES DAILY
Qty: 14 TABLET | Refills: 0 | Status: SHIPPED | OUTPATIENT
Start: 2022-10-24 | End: 2022-10-31

## 2022-10-24 RX ORDER — CEFPODOXIME PROXETIL 200 MG/1
200 TABLET, FILM COATED ORAL
Status: COMPLETED | OUTPATIENT
Start: 2022-10-24 | End: 2022-10-24

## 2022-10-24 RX ORDER — ACETAMINOPHEN 325 MG/1
650 TABLET ORAL
Status: COMPLETED | OUTPATIENT
Start: 2022-10-24 | End: 2022-10-24

## 2022-10-24 RX ORDER — LIDOCAINE 50 MG/G
1 PATCH TOPICAL
Status: COMPLETED | OUTPATIENT
Start: 2022-10-24 | End: 2022-10-25

## 2022-10-24 RX ADMIN — LIDOCAINE 1 PATCH: 50 PATCH CUTANEOUS at 07:10

## 2022-10-24 RX ADMIN — METHOCARBAMOL 500 MG: 500 TABLET ORAL at 07:10

## 2022-10-24 RX ADMIN — ACETAMINOPHEN 650 MG: 325 TABLET ORAL at 07:10

## 2022-10-24 RX ADMIN — SODIUM CHLORIDE 1000 ML: 9 INJECTION, SOLUTION INTRAVENOUS at 07:10

## 2022-10-24 RX ADMIN — CEFPODOXIME PROXETIL 200 MG: 200 TABLET, FILM COATED ORAL at 11:10

## 2022-10-25 ENCOUNTER — TELEPHONE (OUTPATIENT)
Dept: NEUROSURGERY | Facility: CLINIC | Age: 77
End: 2022-10-25
Payer: MEDICARE

## 2022-10-25 VITALS
RESPIRATION RATE: 16 BRPM | HEART RATE: 87 BPM | HEIGHT: 74 IN | BODY MASS INDEX: 21.1 KG/M2 | DIASTOLIC BLOOD PRESSURE: 66 MMHG | OXYGEN SATURATION: 96 % | WEIGHT: 164.44 LBS | SYSTOLIC BLOOD PRESSURE: 155 MMHG | TEMPERATURE: 98 F

## 2022-10-25 PROBLEM — S22.080A T12 COMPRESSION FRACTURE: Status: ACTIVE | Noted: 2022-10-25

## 2022-10-25 PROCEDURE — 25000003 PHARM REV CODE 250: Performed by: EMERGENCY MEDICINE

## 2022-10-25 PROCEDURE — 99284 PR EMERGENCY DEPT VISIT,LEVEL IV: ICD-10-PCS | Mod: ,,, | Performed by: NEUROLOGICAL SURGERY

## 2022-10-25 PROCEDURE — 99284 EMERGENCY DEPT VISIT MOD MDM: CPT | Mod: ,,, | Performed by: NEUROLOGICAL SURGERY

## 2022-10-25 RX ORDER — DEXTROMETHORPHAN HYDROBROMIDE, GUAIFENESIN 5; 100 MG/5ML; MG/5ML
650 LIQUID ORAL EVERY 6 HOURS PRN
Qty: 40 TABLET | Refills: 0 | Status: ON HOLD | OUTPATIENT
Start: 2022-10-25 | End: 2023-03-01 | Stop reason: HOSPADM

## 2022-10-25 RX ORDER — ACETAMINOPHEN 325 MG/1
650 TABLET ORAL
Status: COMPLETED | OUTPATIENT
Start: 2022-10-25 | End: 2022-10-25

## 2022-10-25 RX ADMIN — ACETAMINOPHEN 650 MG: 325 TABLET ORAL at 06:10

## 2022-10-25 NOTE — SUBJECTIVE & OBJECTIVE
(Not in a hospital admission)      Review of patient's allergies indicates:   Allergen Reactions    Iodine      Other reaction(s): swelling  Other reaction(s): Itching  Other reaction(s): Rash       Past Medical History:   Diagnosis Date    Arthritis     Colon polyp     Coronary artery disease     COVID-19 virus infection 2022    Depression     Diabetes mellitus type II     Embolic stroke involving left cerebellar artery 2022    Hyperlipidemia     Hypertension     Kidney stone     Migraine headache     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 2012    STEMI involving right coronary artery 2022    Type II or unspecified type diabetes mellitus with neurological manifestations, uncontrolled(250.62) 2013    Urinary tract infection      Past Surgical History:   Procedure Laterality Date    BACK SURGERY      CATARACT EXTRACTION W/  INTRAOCULAR LENS IMPLANT Right     Per Dr Romero note 2018    COLONOSCOPY N/A 2019    Procedure: COLONOSCOPY Suprep;  Surgeon: Anh Johnson MD;  Location: [facility] ENDO;  Service: Endoscopy;  Laterality: N/A;    ESOPHAGOGASTRODUODENOSCOPY N/A 9/15/2022    Procedure: EGD (ESOPHAGOGASTRODUODENOSCOPY);  Surgeon: Dashawn Evans MD;  Location: [facility] ENDO;  Service: Endoscopy;  Laterality: N/A;    EYE SURGERY      HERNIA REPAIR      LEFT HEART CATHETERIZATION Left 2022    Procedure: CATHETERIZATION, HEART, LEFT;  Surgeon: Will Hurst III, MD;  Location: [facility] CATH LAB/EP;  Service: Cardiology;  Laterality: Left;    renal stones      SHOULDER OPEN ROTATOR CUFF REPAIR       Family History       Problem Relation (Age of Onset)    Diabetes Father          Tobacco Use    Smoking status: Former     Packs/day: 1.50     Years: 25.00     Pack years: 37.50     Types: Cigarettes     Quit date: 1983     Years since quittin.8    Smokeless tobacco: Never   Substance and Sexual Activity    Alcohol use: No    Drug use: No    Sexual activity: Yes      Partners: Female     Review of Systems: see HPI for pertinent positives and negatives.   Objective:     Weight: 74.6 kg (164 lb 7.4 oz)  Body mass index is 21.12 kg/m².  Vital Signs (Most Recent):  Temp: 99 °F (37.2 °C) (10/24/22 2314)  Pulse: 70 (10/24/22 2314)  Resp: 18 (10/24/22 2314)  BP: 134/60 (10/24/22 2314)  SpO2: 95 % (10/24/22 2314) Vital Signs (24h Range):  Temp:  [99 °F (37.2 °C)] 99 °F (37.2 °C)  Pulse:  [63-70] 70  Resp:  [18] 18  SpO2:  [95 %-96 %] 95 %  BP: ()/(58-66) 134/60                          Physical Exam    Neurosurgery Physical Exam    GENERAL: resting comfortably  HEENT: NCAT, PERRL, mucous membranes moist  NECK: supple, trachea midline  CV: normal capillary refill  PULM: aerating well, symmetric expansion, no distress  ABD: soft, NT, ND  EXT: no c/c/e    NEURO:    AAO x 3  CN II-XII grossly intact  Fc x 4 antigravity  SILT    No drift or dysmetria      Significant Labs:  Recent Labs   Lab 10/24/22  1931   *   *   K 4.3   CL 96   CO2 24   BUN 30*   CREATININE 1.3   CALCIUM 8.7     Recent Labs   Lab 10/24/22  1931   WBC 5.82   HGB 12.0*   HCT 36.7*        No results for input(s): LABPT, INR, APTT in the last 48 hours.  Microbiology Results (last 7 days)       Procedure Component Value Units Date/Time    Urine culture [[id number]] Collected: 10/24/22 2151    Order Status: No result Specimen: Urine Updated: 10/24/22 2239          All pertinent labs from the last 24 hours have been reviewed.    Significant Diagnostics:  I have reviewed all pertinent imaging results/findings within the past 24 hours.  CT Lumbar Spine Without Contrast    Result Date: 10/24/2022  1. Mild acute compression fracture of the superior endplate of T12.  See above comments. 2. Mild-moderate acute on chronic compression fracture of the superior endplate of L1 also. 3. Stable mild chronic compression fracture of the anterior superior endplate of L5 with anterior wedging. 4. Multilevel chronic  degenerative changes. Electronically signed by: Sourav Bradshaw Date:    10/24/2022 Time:    21:40    X-Ray Chest AP Portable    Result Date: 10/24/2022  No acute radiographic abnormality.  See above comments.  No significant change. Electronically signed by: Sourav Bradshaw Date:    10/24/2022 Time:    20:21

## 2022-10-25 NOTE — ASSESSMENT & PLAN NOTE
77-year-old male with past medical history of CAD, depression, DM, history of stroke, hypertension, hyperlipidemia, migraine headache who presents to the emergency department with persistent LBP following a mechanical GLF 2 weeks ago while at his nursing home. CT Lsp with mild acute compression fracture of the superior endplate of T12. Stable L1 superior endplate and L5 wedge deformity.    --Patient seen by NSGY at bedside  --All diagnostics reviewed  --All imaging reviewed  --No acute surgical intervention at this time  --Consider admit to medicine if patient's pain is not adequately controlled  --TLSO for mechanical pain  --Obtain XR Tsp/Lsp upright supine in TLSO  --Pain medications per primary team  --Follow-up in outpatient clinic in 4-6 weeks  --If dynamic xrays demonstrate no gross instability or worsening deformity, we will signoff. Please page with questions.     Plan d/w Dr. Spicer.     Dispo: per primary

## 2022-10-25 NOTE — PROVIDER PROGRESS NOTES - EMERGENCY DEPT.
Encounter Date: 10/24/2022    ED Physician Progress Notes            I assumed care of this patient at change of shift from Dr. Gilmore. Briefly, this is a 77 y.o. male who has new compression fractures of T12, recently noted L1 compression fx and chronically at L5. Nsg/spine has been consulted, is in the OR. Neuro intact, normal rectal tone on PA Leong exam.     Will plan for TLSO brace. Normally can ambulate, has needed a walker lately. Lives at [facility].     Care passed off to Dr. Fong at change of shift, awaiting brace and NSG. Labs notable for UTI, rx for cefpodoxime given.     Workup notable for:     Labs Reviewed   CBC W/ AUTO DIFFERENTIAL - Abnormal; Notable for the following components:       Result Value    RBC 4.06 (*)     Hemoglobin 12.0 (*)     Hematocrit 36.7 (*)     All other components within normal limits   COMPREHENSIVE METABOLIC PANEL - Abnormal; Notable for the following components:    Sodium 132 (*)     Glucose 317 (*)     BUN 30 (*)     Albumin 2.6 (*)     Alkaline Phosphatase 170 (*)     AST 43 (*)     eGFR 55.9 (*)     All other components within normal limits   URINALYSIS, REFLEX TO URINE CULTURE - Abnormal; Notable for the following components:    Appearance, UA Cloudy (*)     Protein, UA Trace (*)     Glucose, UA 3+ (*)     Ketones, UA Trace (*)     Leukocytes, UA 3+ (*)     All other components within normal limits    Narrative:     Specimen Source->Urine   URINALYSIS MICROSCOPIC - Abnormal; Notable for the following components:    WBC, UA >100 (*)     WBC Clumps, UA Occasional (*)     Bacteria Many (*)     Yeast, UA Many (*)     Hyaline Casts, UA 9 (*)     All other components within normal limits    Narrative:     Specimen Source->Urine   POCT GLUCOSE - Abnormal; Notable for the following components:    POCT Glucose 351 (*)     All other components within normal limits   POCT GLUCOSE - Abnormal; Notable for the following components:    POCT Glucose 298 (*)     All other  components within normal limits   ISTAT PROCEDURE - Abnormal; Notable for the following components:    POC PCO2 50.1 (*)     POC PO2 30 (*)     POC HCO3 28.3 (*)     POC SATURATED O2 54 (*)     POC TCO2 30 (*)     All other components within normal limits   CULTURE, URINE   HIV 1 / 2 ANTIBODY    Narrative:     Release to patient->Immediate   BETA - HYDROXYBUTYRATE, SERUM   POCT GLUCOSE MONITORING CONTINUOUS   POCT GLUCOSE MONITORING CONTINUOUS     CT Lumbar Spine Without Contrast   Final Result      1. Mild acute compression fracture of the superior endplate of T12.  See above comments.   2. Mild-moderate acute on chronic compression fracture of the superior endplate of L1 also.   3. Stable mild chronic compression fracture of the anterior superior endplate of L5 with anterior wedging.   4. Multilevel chronic degenerative changes.         Electronically signed by: Sourav Bradshaw   Date:    10/24/2022   Time:    21:40      X-Ray Chest AP Portable   Final Result      No acute radiographic abnormality.  See above comments.  No significant change.         Electronically signed by: Sourav Bradshaw   Date:    10/24/2022   Time:    20:21          Medications   LIDOcaine 5 % patch 1 patch (1 patch Transdermal Patch Applied 10/24/22 1935)   sodium chloride 0.9% bolus 1,000 mL (0 mLs Intravenous Stopped 10/24/22 2052)   acetaminophen tablet 650 mg (650 mg Oral Given 10/24/22 1935)   methocarbamoL tablet 500 mg (500 mg Oral Given 10/24/22 1935)   cefpodoxime tablet 200 mg (200 mg Oral Given 10/24/22 2313)         Final diagnoses:  [R73.9] Hyperglycemia (Primary)  [M54.50] Acute left-sided low back pain without sciatica  [N30.00] Acute cystitis without hematuria

## 2022-10-25 NOTE — PROVIDER PROGRESS NOTES - EMERGENCY DEPT.
Encounter Date: 10/24/2022    ED Physician Progress Notes        ED Physician Hand-off Note:    ED Course: I assumed care of patient from off-going ED physician team. Briefly, Patient is a 76 yo M with low back pain after a fall at the NH  He was found to have an acute compression fracture at T12 and acute on chronic of L1  Pt also found to have a UTI, no signs of sepsis, started on abx, prescription for vantin at d/c    At the time of signout plan was pending upright Thoracic spine xray with TLSO brace in place.    Medications given in the ED:    Medications   sodium chloride 0.9% bolus 1,000 mL (0 mLs Intravenous Stopped 10/24/22 2052)   acetaminophen tablet 650 mg (650 mg Oral Given 10/24/22 1935)   methocarbamoL tablet 500 mg (500 mg Oral Given 10/24/22 1935)   LIDOcaine 5 % patch 1 patch (1 patch Transdermal Patch Applied 10/24/22 1935)   cefpodoxime tablet 200 mg (200 mg Oral Given 10/24/22 2313)   acetaminophen tablet 650 mg (650 mg Oral Given 10/25/22 0643)     Chatted with MANUEL Watts and she has reviewed the xrays and pt cleared from NSG standpoint for discharge  he should wear brace when out of bed or sitting upright until follow up in 6 weeks, NSG will schedule an appointment for him    Disposition: back to NH    Patient comfortable with plan for discharge. Patient counseled regarding exam, results, diagnosis, treatment, and plan.    Impression: Final diagnoses:  [R73.9] Hyperglycemia  [M54.50] Acute left-sided low back pain without sciatica  [N30.00] Acute cystitis without hematuria  [S22.080A] Compression fracture of T12 vertebra, initial encounter (Primary)

## 2022-10-25 NOTE — ED PROVIDER NOTES
Encounter Date: 10/24/2022       History     Chief Complaint   Patient presents with    Back Pain     Arrives from [facility]. Reports lower back pain and hyperglycemia.      77-year-old male with past medical history of CAD, depression, DM, history of stroke, hypertension, hyperlipidemia, migraine headache who presents to the emergency department with chief complaint of hyperglycemia and low back pain.  He states that he had a fall approximately 2-3 weeks ago at the nursing home.  He did have imaging after the fall, but has had persistent low back pain since then.  It is mostly located to the left low back.  It occasionally radiates up the back.  He denies radiation to the lower extremities, numbness, tingling, saddle anesthesia.  He is incontinent at baseline.  He also complains of hyperglycemia.  He reports that the nurses at the nursing home told him that his blood sugar was in the 500s earlier today.  He believes that he has been compliant with his insulin regimen, but is unsure of how much insulin he received today.  He thinks he is on a sliding scale.  He denies chest pain, shortness of breath, abdominal pain, nausea, vomiting.  He does report a few episodes of loose stools over the last 2 days.  He is not currently on antibiotics.  He denies any other worsening or alleviating factors.    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 ENDO;  Service: Endoscopy;  Laterality: N/A;    ESOPHAGOGASTRODUODENOSCOPY N/A 9/15/2022    Procedure: EGD (ESOPHAGOGASTRODUODENOSCOPY);  Surgeon: Dashawn Evans MD;  Location: [facility] ENDO;  Service: Endoscopy;  Laterality: N/A;    EYE SURGERY      HERNIA REPAIR      LEFT HEART CATHETERIZATION Left 2022    Procedure: CATHETERIZATION, HEART, LEFT;  Surgeon: Will Hurst III, MD;  Location: [facility] CATH LAB/EP;  Service: Cardiology;  Laterality: Left;    renal stones      SHOULDER OPEN ROTATOR CUFF REPAIR       Family History   Problem Relation Age of Onset    Diabetes Father     Prostate cancer Neg Hx     Kidney disease Neg Hx      Social History     Tobacco Use    Smoking status: Former     Packs/day: 1.50     Years: 25.00     Pack years: 37.50     Types: Cigarettes     Quit date: 1983     Years since quittin.8    Smokeless tobacco: Never   Substance Use Topics    Alcohol use: No    Drug use: No     Review of Systems   Constitutional:  Negative for fever.   HENT:  Negative for sore throat.    Respiratory:  Negative for shortness of breath.    Cardiovascular:  Negative for chest pain.   Gastrointestinal:  Negative for nausea.   Genitourinary:  Negative for dysuria.   Musculoskeletal:  Positive for back pain.   Skin:  Negative for rash.   Neurological:  Negative for weakness.   Hematological:  Does not bruise/bleed easily.     Physical Exam     Initial Vitals [10/24/22 1728]   BP Pulse Resp Temp SpO2   (!) 98/58 70 18 99 °F (37.2 °C) 95 %      MAP       --         Physical Exam    Nursing note and vitals  reviewed.  Constitutional: He appears well-developed and well-nourished. He is not diaphoretic. No distress.   HENT:   Head: Normocephalic and atraumatic.   Dry mucous membranes    Eyes: EOM are normal. Pupils are equal, round, and reactive to light.   Neck: Neck supple.   Normal range of motion.  Cardiovascular:  Normal rate, regular rhythm, normal heart sounds and intact distal pulses.     Exam reveals no gallop and no friction rub.       No murmur heard.  Pulmonary/Chest: Breath sounds normal. He has no wheezes. He has no rhonchi. He has no rales.   Abdominal: Abdomen is soft. Bowel sounds are normal. There is no abdominal tenderness. There is no rebound and no guarding.   Genitourinary:    Genitourinary Comments: Rectal exam performed with nursing chaperone present.  No stool impaction present.  Patient with normal rectal tone.     Musculoskeletal:         General: Normal range of motion.      Cervical back: Normal range of motion and neck supple.     Neurological: He is alert and oriented to person, place, and time. He has normal strength. No cranial nerve deficit or sensory deficit. GCS score is 15. GCS eye subscore is 4. GCS verbal subscore is 5. GCS motor subscore is 6.   Midline tenderness to palpation of the lumbar spine as well as left-sided lumbar paraspinous musculature.  Strength 4/5 bilateral lower extremities.  Sensation intact.  2+ distal pulses.   Skin: Skin is warm and dry. Capillary refill takes less than 2 seconds.   Psychiatric: He has a normal mood and affect. His behavior is normal. Judgment and thought content normal.       ED Course   Procedures  Labs Reviewed   CBC W/ AUTO DIFFERENTIAL - Abnormal; Notable for the following components:       Result Value    RBC 4.06 (*)     Hemoglobin 12.0 (*)     Hematocrit 36.7 (*)     All other components within normal limits   COMPREHENSIVE METABOLIC PANEL - Abnormal; Notable for the following components:    Sodium 132 (*)     Glucose 317 (*)     BUN 30  (*)     Albumin 2.6 (*)     Alkaline Phosphatase 170 (*)     AST 43 (*)     eGFR 55.9 (*)     All other components within normal limits   URINALYSIS, REFLEX TO URINE CULTURE - Abnormal; Notable for the following components:    Appearance, UA Cloudy (*)     Protein, UA Trace (*)     Glucose, UA 3+ (*)     Ketones, UA Trace (*)     Leukocytes, UA 3+ (*)     All other components within normal limits    Narrative:     Specimen Source->Urine   URINALYSIS MICROSCOPIC - Abnormal; Notable for the following components:    WBC, UA >100 (*)     WBC Clumps, UA Occasional (*)     Bacteria Many (*)     Yeast, UA Many (*)     Hyaline Casts, UA 9 (*)     All other components within normal limits    Narrative:     Specimen Source->Urine   POCT GLUCOSE - Abnormal; Notable for the following components:    POCT Glucose 351 (*)     All other components within normal limits   POCT GLUCOSE - Abnormal; Notable for the following components:    POCT Glucose 298 (*)     All other components within normal limits   ISTAT PROCEDURE - Abnormal; Notable for the following components:    POC PCO2 50.1 (*)     POC PO2 30 (*)     POC HCO3 28.3 (*)     POC SATURATED O2 54 (*)     POC TCO2 30 (*)     All other components within normal limits   CULTURE, URINE   HIV 1 / 2 ANTIBODY    Narrative:     Release to patient->Immediate   BETA - HYDROXYBUTYRATE, SERUM   POCT GLUCOSE MONITORING CONTINUOUS   POCT GLUCOSE MONITORING CONTINUOUS     EKG Readings: (Independently Interpreted)   Initial Reading: No STEMI. Rhythm: Normal Sinus Rhythm. Heart Rate: 63. Ectopy: PVCs.   ECG Results              EKG 12-lead (Final result)  Result time 10/25/22 08:43:07      Final result by Interface, Lab In [facility] (10/25/22 08:43:07)                   Narrative:    Test Reason : R73.9,    Vent. Rate : 063 BPM     Atrial Rate : 063 BPM     P-R Int : 188 ms          QRS Dur : 112 ms      QT Int : 434 ms       P-R-T Axes : 067 011 047 degrees     QTc Int : 444 ms    Sinus rhythm  with occasional Premature ventricular complexes  Nonspecific ST and/or T wave abnormalities  Abnormal ECG  When compared with ECG of 12-OCT-2022 17:26,  Premature ventricular complexes are now Present  T wave inversion no longer evident in Inferior leads  Nonspecific T wave abnormality no longer evident in Anterior leads  Confirmed by Romel Niño MD ([id number]) on 10/25/2022 8:42:55 AM    Referred By: [email or web address]   SELF           Confirmed By:Romel Niño MD                                  Imaging Results              X-Ray Thoracic Spine AP Lateral, Upright (Final result)  Result time 10/25/22 08:36:29      Final result by Shekhar Tony MD (10/25/22 08:36:29)                   Impression:      T12 and L1 endplate vertebral body fracture deformities stable.      Electronically signed by: Shekhar Tony MD  Date:    10/25/2022  Time:    08:36               Narrative:    EXAMINATION:  XR THORACIC SPINE AP LATERAL  UPRIGHT    CLINICAL HISTORY:  compression fracture in TLSO;    TECHNIQUE:  Stating lateral upright radiograph dorsal spine    COMPARISON:  10/25/2022 and CT radiographs dating back 04/13/2022    FINDINGS:  Superior endplate minimal mild compression fracture T12 and superior and inferior endplate mild compression fracture at L1 stable and unchanged.  No new subluxation.  Osteopenia, anterior spondylosis again noted.                                       X-Ray Thoracic Spine Lateral Supine and Lateral Upright (Final result)  Result time 10/25/22 06:54:50      Final result by Constantine Barry MD (10/25/22 06:54:50)                   Impression:      See above.      Electronically signed by: Constantine Barry MD  Date:    10/25/2022  Time:    06:54               Narrative:    EXAMINATION:  XR THORACIC SPINE LATERAL SUPINE AND LATERAL UPRIGHT    CLINICAL HISTORY:  eval TLSO, hx t12 fracture;    TECHNIQUE:  Two lateral views of the thoracolumbar spine.    COMPARISON:  10/25/2022.    FINDINGS:  Exam  quality is limited by suboptimal technique and possible airspace opacities in the lung bases.  There are probable mild compression fracture deformities at T12 and L1 as seen previously.  No significant change in alignment or new acute fracture.                                       X-Ray Thoracic Spine Lateral Supine and Lateral Upright (Final result)  Result time 10/25/22 02:40:26      Final result by Angel Luis Hugo MD (10/25/22 02:40:26)                   Impression:      As above.      Electronically signed by: Angel Luis Hugo MD  Date:    10/25/2022  Time:    02:40               Narrative:    EXAMINATION:  XR THORACIC SPINE LATERAL SUPINE AND LATERAL UPRIGHT    CLINICAL HISTORY:  eval tlso, t12 fracture;    TECHNIQUE:  Thoracic spine supine and semi erect lateral views.    COMPARISON:  CT lumbar spine 10/24/2022. CT thoracic spine 06/30/2022.    FINDINGS:  Previously described mild acute compression fracture superior endplate of T12 and mild to moderate acute on chronic compression fracture superior endplate of L1 appears similar to the prior day lumbar CT exam.  Remainder of the visualized lumbar spine also appears similar.  Remainder of the visualized thoracic spine appears similar to 06/30/2022 thoracic CT.                                       CT Lumbar Spine Without Contrast (Final result)  Result time 10/24/22 21:40:46      Final result by Sourav Mathis MD (10/24/22 21:40:46)                   Impression:      1. Mild acute compression fracture of the superior endplate of T12.  See above comments.  2. Mild-moderate acute on chronic compression fracture of the superior endplate of L1 also.  3. Stable mild chronic compression fracture of the anterior superior endplate of L5 with anterior wedging.  4. Multilevel chronic degenerative changes.      Electronically signed by: Sourav Mathis  Date:    10/24/2022  Time:    21:40               Narrative:    EXAMINATION:  CT LUMBAR SPINE WITHOUT  CONTRAST    CLINICAL HISTORY:  Low back pain, trauma;    TECHNIQUE:  Low-dose axial, sagittal and coronal reformations are obtained through the lumbar spine.  Contrast was not administered.    COMPARISON:  10/05/2022    FINDINGS:  Mild acute compression fracture superior endplate of T12 is new from the prior study.    Mild-moderate acute on chronic compression fracture superior endplate of L1.    No comminution or retropulsion of these fractures.    Stable mild chronic compression fracture of the anterior superior endplate of L5 with anterior wedging.    Severe disc space narrowing at L5-S1, L3-4 to a slightly lesser degree L2-3.  Endplate degenerative changes are most prominent at L2-3.    L1-2: No significant disc bulges.  No significant central canal or foraminal narrowing.    L2-3: Mild to moderate posterior diffuse disc osteophyte complex.  Moderate central canal narrowing.  Mild bilateral foraminal narrowing.    L3-4: Mild to moderate posterior diffuse disc osteophyte complex.  Ligamentum flavum hypertrophy with mild facet arthropathy.  Severe central canal narrowing.  Mild bilateral foraminal narrowing.    L4-5: Mild diffuse posterior disc osteophyte complex.  Moderate to severe facet arthropathy moderate central canal narrowing.  Mild bilateral foraminal narrowing.    L5-S1: Minimal posterior osteophytic spurring.  Mild bilateral foraminal narrowing.  Central canal is adequately maintained.  Moderate bilateral facet arthropathy.    Diverticulosis of the colon is incidentally noted.  Retained feces in the colon and rectum also.                                       X-Ray Chest AP Portable (Final result)  Result time 10/24/22 20:21:27      Final result by Sourav Mathis MD (10/24/22 20:21:27)                   Impression:      No acute radiographic abnormality.  See above comments.  No significant change.      Electronically signed by: Sourav Mathis  Date:    10/24/2022  Time:    20:21                Narrative:    EXAMINATION:  XR CHEST AP PORTABLE    CLINICAL HISTORY:  hyperglycemia;    TECHNIQUE:  Single frontal view of the chest was performed.    COMPARISON:  10/12/2022    FINDINGS:  Cardiac silhouette is within normal limits.    Emphysematous changes with mild interstitial scarring bilaterally.  No effusion or pneumothorax.  No mass or consolidation is detected.  No acute osseous abnormality.    No significant change.                                    X-Rays:   Independently Interpreted Readings:   Chest X-Ray: Normal heart size.  No infiltrates.  No acute abnormalities.   Medications   sodium chloride 0.9% bolus 1,000 mL (0 mLs Intravenous Stopped 10/24/22 2052)   acetaminophen tablet 650 mg (650 mg Oral Given 10/24/22 1935)   methocarbamoL tablet 500 mg (500 mg Oral Given 10/24/22 1935)   LIDOcaine 5 % patch 1 patch (1 patch Transdermal Patch Applied 10/24/22 1935)   cefpodoxime tablet 200 mg (200 mg Oral Given 10/24/22 2313)   acetaminophen tablet 650 mg (650 mg Oral Given 10/25/22 0643)     Medical Decision Making:   History:   Old Medical Records: I decided to obtain old medical records.  Initial Assessment:   Emergent evaluation of a 77-year-old male who presents to the emergency department with chief complaint of hyperglycemia and low back pain.  He was told that his sugar was greater than 500 today.  He had a mechanical ground level fall approximately 2-3 weeks ago with persistent low back pain.  Patient is afebrile, hemodynamically stable, nontoxic appearing.  Will order labs, imaging, analgesia.  Differential Diagnosis:   Differential diagnosis includes but is not limited to hyperglycemia, metabolic derangement, DKA, compression fracture, muscular strain.  Independently Interpreted Test(s):   I have ordered and independently interpreted X-rays - see prior notes.  I have ordered and independently interpreted EKG Reading(s) - see prior notes  Clinical Tests:   Lab Tests: Ordered and  Reviewed  Radiological Study: Ordered and Reviewed  Medical Tests: Reviewed and Ordered  ED Management:  No severe hematologic derangements.  Anemia at baseline.  Hyperglycemia of 317.  Labs are not suggestive of DKA.  Beta hydroxybutyrate 0.2.  Per chart review, patient was recently discharged with a short and long-acting insulin.  From the paperwork that was sent from the nursing home, it appears that he is only taking the short-acting insulin.  I will supply him with a prescription for Levemir that was previously given to him.  CT lumbar spine with mild acute compression fracture of the superior endplate of T12.  Mild to moderate compression fracture of the superior endplate of L1.  Chronic compression fracture at L5.  Will consult neurosurgery.  Due to shift change, will sign out to night team who will continue to monitor until final disposition is reached.  Discussed with patient.  All questions answered.          Attending Attestation:     Physician Attestation Statement for NP/PA:   I discussed this assessment and plan of this patient with the NP/PA, but I did not personally examine the patient. The face to face encounter was performed by the NP/PA.                         Clinical Impression:   Final diagnoses:  [R73.9] Hyperglycemia  [M54.50] Acute left-sided low back pain without sciatica  [N30.00] Acute cystitis without hematuria  [S22.080A] Compression fracture of T12 vertebra, initial encounter (Primary)        ED Disposition Condition    Discharge Stable          ED Prescriptions       Medication Sig Dispense Start Date End Date Auth. Provider    insulin detemir U-100 (LEVEMIR FLEXTOUCH) 100 unit/mL (3 mL) SubQ InPn pen Inject 6 Units into the skin 2 (two) times daily. 3.6 mL 10/24/2022 11/23/2022 Laureen Leong PA-C    cefpodoxime (VANTIN) 100 MG tablet Take 1 tablet (100 mg total) by mouth 2 (two) times daily. for 7 days 14 tablet 10/24/2022 10/31/2022 Laura Melissa MD    acetaminophen  (TYLENOL) 650 MG TbSR Take 1 tablet (650 mg total) by mouth every 6 (six) hours as needed (pain). 40 tablet 10/25/2022 -- Angella Anderson MD          Follow-up Information       Follow up With Specialties Details Why Contact Info Additional Information    [facility] - Emergency Dept Emergency Medicine Go to  If symptoms worsen [address] [facility] [id number] [phone]     Basim Guerrero MD Family Medicine Schedule an appointment as soon as possible for a visit in 1 week  [address] [facility] [address]  [phone]       [facility] - Neurosurgery [facility]   [address] [facility] [phone] 8th Floor Clinic [place] Please park in [facility]. Check in desk is located in the lobby. Please take the C elevator to 8th floor which opens to the lobby.             Laureen Leong PA-C  10/25/22 1111

## 2022-10-25 NOTE — ED NOTES
LOC: Patient is awake, alert, and aware of environment with an appropriate affect. Patient is oriented x 4 and speaking appropriately.  APPEARANCE: Patient resting comfortably and in no acute distress. Patient is clean and well groomed, patient's clothing is properly fastened.  SKIN: The skin is warm and dry. Patient has normal skin turgor and moist mucus membranes.   MUSKULOSKELETAL: Patient is moving all extremities well, no obvious deformities noted. Pulses intact. Lower back pain reported  RESPIRATORY: Airway is open and patent. Respirations are spontaneous and non-labored with normal effort and rate.  CARDIAC: Patient has a normal rate and rhythm.  No peripheral edema noted.   ABDOMEN: No distention noted. Soft and non-tender upon palpation.  NEUROLOGICAL: PERRL. Facial expression is symmetrical. Hand grasps are equal bilaterally. Normal sensation in all extremities when touched with finger.

## 2022-10-25 NOTE — DISCHARGE INSTRUCTIONS
From the previous hospital medicine team, the patient should be taking:   insulin 6 U of Detemir twice daily + 15 U aspart three times daily with meals plus SSI. Goal -180.  Please ensure that he is taking the appropriate regimen.   Follow up with primary care or return to the ER for any new or worsening symptoms.     You also have a urinary tract infection. Take the full course of antibiotics.     Also you have acute compression fractures of your T12 and L1 spine, you will need to wear you TLSO brace anytime you are upright in bed or out of bed. If laying flat in bed you do not need to wear the brace.  You will need to follow up with neurosurgery spine in 6 weeks and should wear the brace as above until then.

## 2022-10-25 NOTE — HPI
77-year-old male with past medical history of CAD, depression, DM, history of stroke, hypertension, hyperlipidemia, migraine headache who presents to the emergency department with persistent LBP following a mechanical GLF 2 weeks ago while at his nursing home. There are no radicular symptoms. Denies fever/chills, HA, vision/hearing changes, dysphagia, dysarthria, nausea/vomiting, new-onset weakness or sensory change, bowel/bladder changes. He is incontinent at baseline.  in the ED. On Eliquis and Plavix. CT Lsp with mild acute compression fracture of the superior endplate of T12. Stable L1 superior endplate and L5 wedge deformity.

## 2022-10-25 NOTE — TELEPHONE ENCOUNTER
----- Message from Tray Osullivan MD sent at 10/25/2022 11:33 AM CDT -----  Please schedule pt for 6 week f/u with PA. Thank you.

## 2022-10-25 NOTE — ED NOTES
Called [employer] to attain TLSO not LSO brace for patient, no answer at this time, MD notified. Will attempt again

## 2022-10-25 NOTE — PROVIDER PROGRESS NOTES - EMERGENCY DEPT.
Patient was signed out to me by Dr. Melsisa pending TLSO brace placement and neurosurgery recommendations. Briefly, this is a 76yo M found to have new T12 compression fracture and UTI.    PE:   Const: Awake and alert, NAD  Resp: Even and unlabored  Neuro: CN grossly intact, lying flat in bed  Psych: Normal mood and affect    Data:  Results for orders placed or performed during the hospital encounter of 10/24/22   HIV 1/2 Ag/Ab (4th Gen)   Result Value Ref Range    HIV 1/2 Ag/Ab Non-reactive Non-reactive   CBC auto differential   Result Value Ref Range    WBC 5.82 3.90 - 12.70 K/uL    RBC 4.06 (L) 4.60 - 6.20 M/uL    Hemoglobin 12.0 (L) 14.0 - 18.0 g/dL    Hematocrit 36.7 (L) 40.0 - 54.0 %    MCV 90 82 - 98 fL    MCH 29.6 27.0 - 31.0 pg    MCHC 32.7 32.0 - 36.0 g/dL    RDW 11.7 11.5 - 14.5 %    Platelets 242 150 - 450 K/uL    MPV 10.0 9.2 - 12.9 fL    Immature Granulocytes 0.2 0.0 - 0.5 %    Gran # (ANC) 3.5 1.8 - 7.7 K/uL    Immature Grans (Abs) 0.01 0.00 - 0.04 K/uL    Lymph # 1.3 1.0 - 4.8 K/uL    Mono # 0.7 0.3 - 1.0 K/uL    Eos # 0.2 0.0 - 0.5 K/uL    Baso # 0.05 0.00 - 0.20 K/uL    nRBC 0 0 /100 WBC    Gran % 60.4 38.0 - 73.0 %    Lymph % 22.9 18.0 - 48.0 %    Mono % 12.2 4.0 - 15.0 %    Eosinophil % 3.4 0.0 - 8.0 %    Basophil % 0.9 0.0 - 1.9 %    Differential Method Automated    Comprehensive metabolic panel   Result Value Ref Range    Sodium 132 (L) 136 - 145 mmol/L    Potassium 4.3 3.5 - 5.1 mmol/L    Chloride 96 95 - 110 mmol/L    CO2 24 23 - 29 mmol/L    Glucose 317 (H) 70 - 110 mg/dL    BUN 30 (H) 8 - 23 mg/dL    Creatinine 1.3 0.5 - 1.4 mg/dL    Calcium 8.7 8.7 - 10.5 mg/dL    Total Protein 6.0 6.0 - 8.4 g/dL    Albumin 2.6 (L) 3.5 - 5.2 g/dL    Total Bilirubin 0.3 0.1 - 1.0 mg/dL    Alkaline Phosphatase 170 (H) 55 - 135 U/L    AST 43 (H) 10 - 40 U/L    ALT 36 10 - 44 U/L    Anion Gap 12 8 - 16 mmol/L    eGFR 55.9 (A) >60 mL/min/1.73 m^2   Beta - Hydroxybutyrate, Serum   Result Value Ref Range     Beta-Hydroxybutyrate 0.2 0.0 - 0.5 mmol/L   Urinalysis, Reflex to Urine Culture Urine, Clean Catch    Specimen: Urine   Result Value Ref Range    Specimen UA Urine, Clean Catch     Color, UA Yellow Yellow, Straw, Ellie    Appearance, UA Cloudy (A) Clear    pH, UA 5.0 5.0 - 8.0    Specific Gravity, UA 1.025 1.005 - 1.030    Protein, UA Trace (A) Negative    Glucose, UA 3+ (A) Negative    Ketones, UA Trace (A) Negative    Bilirubin (UA) Negative Negative    Occult Blood UA Negative Negative    Nitrite, UA Negative Negative    Leukocytes, UA 3+ (A) Negative   Urinalysis Microscopic   Result Value Ref Range    RBC, UA 3 0 - 4 /hpf    WBC, UA >100 (H) 0 - 5 /hpf    WBC Clumps, UA Occasional (A) None-Rare    Bacteria Many (A) None-Occ /hpf    Yeast, UA Many (A) None    Hyaline Casts, UA 9 (A) 0-1/lpf /lpf    Microscopic Comment SEE COMMENT    POCT glucose   Result Value Ref Range    POCT Glucose 351 (H) 70 - 110 mg/dL   POCT glucose   Result Value Ref Range    POCT Glucose 298 (H) 70 - 110 mg/dL   ISTAT PROCEDURE   Result Value Ref Range    POC PH 7.359 7.35 - 7.45    POC PCO2 50.1 (H) 35 - 45 mmHg    POC PO2 30 (L) 40 - 60 mmHg    POC HCO3 28.3 (H) 24 - 28 mmol/L    POC BE 3 -2 to 2 mmol/L    POC SATURATED O2 54 (L) 95 - 100 %    POC TCO2 30 (H) 24 - 29 mmol/L    Sample VENOUS     Site Other     Allens Test N/A       Imaging Results              X-Ray Thoracic Spine Lateral Supine and Lateral Upright (In process)                      X-Ray Thoracic Spine Lateral Supine and Lateral Upright (Final result)  Result time 10/25/22 02:40:26      Final result by Angel Luis Hugo MD (10/25/22 02:40:26)                   Impression:      As above.      Electronically signed by: Angel Luis Hugo MD  Date:    10/25/2022  Time:    02:40               Narrative:    EXAMINATION:  XR THORACIC SPINE LATERAL SUPINE AND LATERAL UPRIGHT    CLINICAL HISTORY:  eval tlso, t12 fracture;    TECHNIQUE:  Thoracic spine supine and semi erect  lateral views.    COMPARISON:  CT lumbar spine 10/24/2022. CT thoracic spine 06/30/2022.    FINDINGS:  Previously described mild acute compression fracture superior endplate of T12 and mild to moderate acute on chronic compression fracture superior endplate of L1 appears similar to the prior day lumbar CT exam.  Remainder of the visualized lumbar spine also appears similar.  Remainder of the visualized thoracic spine appears similar to 06/30/2022 thoracic CT.                                       CT Lumbar Spine Without Contrast (Final result)  Result time 10/24/22 21:40:46      Final result by Sourav Mathis MD (10/24/22 21:40:46)                   Impression:      1. Mild acute compression fracture of the superior endplate of T12.  See above comments.  2. Mild-moderate acute on chronic compression fracture of the superior endplate of L1 also.  3. Stable mild chronic compression fracture of the anterior superior endplate of L5 with anterior wedging.  4. Multilevel chronic degenerative changes.      Electronically signed by: Sourav Mathis  Date:    10/24/2022  Time:    21:40               Narrative:    EXAMINATION:  CT LUMBAR SPINE WITHOUT CONTRAST    CLINICAL HISTORY:  Low back pain, trauma;    TECHNIQUE:  Low-dose axial, sagittal and coronal reformations are obtained through the lumbar spine.  Contrast was not administered.    COMPARISON:  10/05/2022    FINDINGS:  Mild acute compression fracture superior endplate of T12 is new from the prior study.    Mild-moderate acute on chronic compression fracture superior endplate of L1.    No comminution or retropulsion of these fractures.    Stable mild chronic compression fracture of the anterior superior endplate of L5 with anterior wedging.    Severe disc space narrowing at L5-S1, L3-4 to a slightly lesser degree L2-3.  Endplate degenerative changes are most prominent at L2-3.    L1-2: No significant disc bulges.  No significant central canal or foraminal  narrowing.    L2-3: Mild to moderate posterior diffuse disc osteophyte complex.  Moderate central canal narrowing.  Mild bilateral foraminal narrowing.    L3-4: Mild to moderate posterior diffuse disc osteophyte complex.  Ligamentum flavum hypertrophy with mild facet arthropathy.  Severe central canal narrowing.  Mild bilateral foraminal narrowing.    L4-5: Mild diffuse posterior disc osteophyte complex.  Moderate to severe facet arthropathy moderate central canal narrowing.  Mild bilateral foraminal narrowing.    L5-S1: Minimal posterior osteophytic spurring.  Mild bilateral foraminal narrowing.  Central canal is adequately maintained.  Moderate bilateral facet arthropathy.    Diverticulosis of the colon is incidentally noted.  Retained feces in the colon and rectum also.                                       X-Ray Chest AP Portable (Final result)  Result time 10/24/22 20:21:27      Final result by Sourav Mathis MD (10/24/22 20:21:27)                   Impression:      No acute radiographic abnormality.  See above comments.  No significant change.      Electronically signed by: Sourav Mathis  Date:    10/24/2022  Time:    20:21               Narrative:    EXAMINATION:  XR CHEST AP PORTABLE    CLINICAL HISTORY:  hyperglycemia;    TECHNIQUE:  Single frontal view of the chest was performed.    COMPARISON:  10/12/2022    FINDINGS:  Cardiac silhouette is within normal limits.    Emphysematous changes with mild interstitial scarring bilaterally.  No effusion or pneumothorax.  No mass or consolidation is detected.  No acute osseous abnormality.    No significant change.                                       MDM:   77-year-old male found to have new T12 compression fracture.  Unfortunately, care was delayed as LSO brace was placed, initial x-ray reflecting this brace instead of TLSO.  Neurosurgery saw the patient, recommended TLSO brace and repeat x-ray.  TLSO brace was then placed, however the patient was unable to sit  up to tolerate the x-ray. Patient was given tylenol for pain control with plan for repeat XR upright and further neurosurgery recs. Signed out to oncoming provider.

## 2022-10-25 NOTE — ED NOTES
Bed: [facility]  Expected date:   Expected time:   Means of arrival:   Comments:  chall

## 2022-10-25 NOTE — CONSULTS
[facility] - Emergency Dept  Neurosurgery  Consult Note    Inpatient consult to Neurosurgery  Consult performed by: Tray Osullivan MD  Consult ordered by: Deirdre Fogn MD        Subjective:     Chief Complaint/Reason for Admission: T12 fracture    History of Present Illness: 77-year-old male with past medical history of CAD, depression, DM, history of stroke, hypertension, hyperlipidemia, migraine headache who presents to the emergency department with persistent LBP following a mechanical GLF 2 weeks ago while at his nursing home. There are no radicular symptoms. Denies fever/chills, HA, vision/hearing changes, dysphagia, dysarthria, nausea/vomiting, new-onset weakness or sensory change, bowel/bladder changes. He is incontinent at baseline.  in the ED. On Eliquis and Plavix. CT Lsp with mild acute compression fracture of the superior endplate of T12. Stable L1 superior endplate and L5 wedge deformity.      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    Hyperlipidemia     Hypertension     Kidney stone     Migraine headache     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 08/02/2012    STEMI involving right coronary artery 01/09/2022    Type II or unspecified type diabetes mellitus with neurological manifestations, uncontrolled(250.62) 03/08/2013    Urinary tract infection      Past Surgical History:   Procedure Laterality Date    BACK SURGERY      CATARACT EXTRACTION W/  INTRAOCULAR LENS IMPLANT Right     Per Dr Romero note 11/2018    COLONOSCOPY N/A 1/28/2019    Procedure: COLONOSCOPY Suprep;   Surgeon: Anh Johnson MD;  Location: [facility] ENDO;  Service: Endoscopy;  Laterality: N/A;    ESOPHAGOGASTRODUODENOSCOPY N/A 9/15/2022    Procedure: EGD (ESOPHAGOGASTRODUODENOSCOPY);  Surgeon: Dashawn Evans MD;  Location: [facility] ENDO;  Service: Endoscopy;  Laterality: N/A;    EYE SURGERY      HERNIA REPAIR      LEFT HEART CATHETERIZATION Left 2022    Procedure: CATHETERIZATION, HEART, LEFT;  Surgeon: Will Hurst III, MD;  Location: [facility] CATH LAB/EP;  Service: Cardiology;  Laterality: Left;    renal stones      SHOULDER OPEN ROTATOR CUFF REPAIR       Family History       Problem Relation (Age of Onset)    Diabetes Father          Tobacco Use    Smoking status: Former     Packs/day: 1.50     Years: 25.00     Pack years: 37.50     Types: Cigarettes     Quit date: 1983     Years since quittin.8    Smokeless tobacco: Never   Substance and Sexual Activity    Alcohol use: No    Drug use: No    Sexual activity: Yes     Partners: Female     Review of Systems: see HPI for pertinent positives and negatives.   Objective:     Weight: 74.6 kg (164 lb 7.4 oz)  Body mass index is 21.12 kg/m².  Vital Signs (Most Recent):  Temp: 99 °F (37.2 °C) (10/24/22 2314)  Pulse: 70 (10/24/22 2314)  Resp: 18 (10/24/22 2314)  BP: 134/60 (10/24/22 2314)  SpO2: 95 % (10/24/22 2314) Vital Signs (24h Range):  Temp:  [99 °F (37.2 °C)] 99 °F (37.2 °C)  Pulse:  [63-70] 70  Resp:  [18] 18  SpO2:  [95 %-96 %] 95 %  BP: ()/(58-66) 134/60                          Physical Exam    Neurosurgery Physical Exam    GENERAL: resting comfortably  HEENT: NCAT, PERRL, mucous membranes moist  NECK: supple, trachea midline  CV: normal capillary refill  PULM: aerating well, symmetric expansion, no distress  ABD: soft, NT, ND  EXT: no c/c/e    NEURO:    AAO x 3  CN II-XII grossly intact  Fc x 4 antigravity  SILT    No drift or dysmetria      Significant Labs:  Recent Labs   Lab 10/24/22  193   *   *   K 4.3    CL 96   CO2 24   BUN 30*   CREATININE 1.3   CALCIUM 8.7     Recent Labs   Lab 10/24/22  1931   WBC 5.82   HGB 12.0*   HCT 36.7*        No results for input(s): LABPT, INR, APTT in the last 48 hours.  Microbiology Results (last 7 days)       Procedure Component Value Units Date/Time    Urine culture [[id number]] Collected: 10/24/22 2151    Order Status: No result Specimen: Urine Updated: 10/24/22 2239          All pertinent labs from the last 24 hours have been reviewed.    Significant Diagnostics:  I have reviewed all pertinent imaging results/findings within the past 24 hours.  CT Lumbar Spine Without Contrast    Result Date: 10/24/2022  1. Mild acute compression fracture of the superior endplate of T12.  See above comments. 2. Mild-moderate acute on chronic compression fracture of the superior endplate of L1 also. 3. Stable mild chronic compression fracture of the anterior superior endplate of L5 with anterior wedging. 4. Multilevel chronic degenerative changes. Electronically signed by: Sourav Bradshaw Date:    10/24/2022 Time:    21:40    X-Ray Chest AP Portable    Result Date: 10/24/2022  No acute radiographic abnormality.  See above comments.  No significant change. Electronically signed by: Sourav Bradshaw Date:    10/24/2022 Time:    20:21     Assessment/Plan:     T12 compression fracture  77-year-old male with past medical history of CAD, depression, DM, history of stroke, hypertension, hyperlipidemia, migraine headache who presents to the emergency department with persistent LBP following a mechanical GLF 2 weeks ago while at his nursing home. CT Lsp with mild acute compression fracture of the superior endplate of T12. Stable L1 superior endplate and L5 wedge deformity.    --Patient seen by NSGY at bedside  --All diagnostics reviewed  --All imaging reviewed  --No acute surgical intervention at this time  --Consider admit to medicine if patient's pain is not adequately controlled  --TLSO for  mechanical pain  --Obtain XR Tsp/Lsp upright supine in TLSO  --Pain medications per primary team  --Follow-up in outpatient clinic in 4-6 weeks  --If dynamic xrays demonstrate no gross instability or worsening deformity, we will signoff. Please page with questions.     Plan d/w Dr. Spicer.     Dispo: per primary          Thank you for your consult. I will follow-up with patient. Please contact us if you have any additional questions.    Tray Osullivan MD  Neurosurgery  [facility] - Emergency Dept

## 2022-10-26 LAB
BACTERIA UR CULT: NORMAL
BACTERIA UR CULT: NORMAL

## 2022-11-01 ENCOUNTER — TELEPHONE (OUTPATIENT)
Dept: FAMILY MEDICINE | Facility: CLINIC | Age: 77
End: 2022-11-01
Payer: MEDICARE

## 2022-11-01 NOTE — DISCHARGE SUMMARY
[facility] Medicine  Discharge Summary      Patient Name: Kamar Muñoz  MRN: [id number]  Patient Class: IP- Inpatient  Admission Date: 10/5/2022  Hospital Length of Stay: 6 days  Discharge Date and Time: 10/11/2022  1:09 PM  Attending Physician: No att. providers found   Discharging Provider: Israel Escamilla MD  Primary Care Provider: Basim Guerrero MD  Hospital Medicine Team: Networked reference to record PCT  Israel Escamilla MD    HPI:      Mr. Muñoz is a 77 y.o M/ w/ hx of stroke, T2DM(poorly controlled with recurrent DKA), CAD, HLD, paroxysmal Afib, and seizures who presented to the ED via EMS from [facility] with concerns of nausea/vomiting, hypotension, hyperglycemia. Per daughter, he has been depressed and has had very poor oral intake since his wife passed away 4 weeks ago.  He has not been taking care of himself. He was seen by his PCP 3 days ago who adjusted his anitdepressants dose recently. He also had a fall last Friday while he was getting dressed that was described as mild. It was not associated with head trauma, as most of fall was on his elbow.      ED course notable for hyperglycemia, keotacidosis, hypotension requiring vasopressors. Critical Care Medicine was consulted for evaluation.       * No surgery found *      Hospital Course:   No acute events overnight, afebrile. Weaned off vasopressors. DKA improving. He is more alert and oriented and appears back to his baseline. Endocrinology was consulted and assisted with insulin management. Once blood glucose levels remained stable and at goal, patient was discharge back to his nursing home with outpatient PCP and endocrinology followup.           Goals of Care Treatment Preferences:  Code Status: DNR      Consults:   Consults (From admission, onward)        Status Ordering Provider     Inpatient consult to Endocrinology  Once        Provider:  (Not yet assigned)    Completed ISRAEL ESCAMILLA      Inpatient consult to Registered Dietitian/Nutritionist  Once        Provider:  (Not yet assigned)    Completed GRAYSON GONZALEZ     Inpatient consult to Critical Care Medicine  Once        Provider:  (Not yet assigned)    Completed DONTA FOSTER          No new Assessment & Plan notes have been filed under this hospital service since the last note was generated.  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] 01/30/2022 Yes    History of partial seizures [Z86.69] 01/26/2022 Not Applicable    Encephalopathy, metabolic [G93.41] 01/25/2022 Yes    Paroxysmal atrial fibrillation [I48.0] 01/12/2022 Yes     Chronic    Coronary artery disease involving native coronary artery of native heart with unstable angina pectoris [I25.110] 01/09/2022 Yes     Chronic    BRENDAN (acute kidney injury) [N17.9] 08/30/2016 Yes    Type 2 diabetes mellitus with hyperglycemia, with long-term current use of insulin [E11.65, Z79.4] 03/08/2013 Not Applicable     Chronic    Essential hypertension [I10] 07/20/2012 Yes     Chronic      Problems Resolved During this Admission:    Diagnosis Date Noted Date Resolved POA    PRINCIPAL PROBLEM:  Septic shock [A41.9, R65.21] 02/20/2022 10/09/2022 Yes    History of ST elevation myocardial infarction (STEMI) [I25.2] 01/19/2022 10/12/2022 Not Applicable     Chronic       Discharged Condition: good    Disposition: Skilled Nursing Facility    Follow  Up:    Patient Instructions:      Ambulatory referral/consult to Endocrinology   Standing Status: Future   Referral Priority: Routine Referral Type: Consultation   Requested Specialty: Endocrinology   Number of Visits Requested: 1     Ambulatory referral/consult to Adult Neuropsychology   Standing Status: Future   Referral Priority: Routine Referral Type: Psychiatric   Referral Reason: Specialty Services Required   Number of Visits Requested: 1       Significant Diagnostic Studies: Labs:     CBC:   Latest Reference Range & Units 10/11/22 05:46   WBC 3.90 - 12.70 K/uL 7.76   RBC 4.60 - 6.20 M/uL 4.14 (L)   Hemoglobin 14.0 - 18.0 g/dL 12.4 (L)   Hematocrit 40.0 - 54.0 % 37.2 (L)   MCV 82 - 98 fL 90   MCH 27.0 - 31.0 pg 30.0   MCHC 32.0 - 36.0 g/dL 33.3   RDW 11.5 - 14.5 % 11.3 (L)   Platelets 150 - 450 K/uL 156   (L): Data is abnormally low      CMP:   Latest Reference Range & Units 10/11/22 05:46   Sodium 136 - 145 mmol/L 137   Potassium 3.5 - 5.1 mmol/L 3.7   Chloride 95 - 110 mmol/L 103   CO2 23 - 29 mmol/L 26   Anion Gap 8 - 16 mmol/L 8   BUN 8 - 23 mg/dL 11   Creatinine 0.5 - 1.4 mg/dL 0.8   eGFR >60 mL/min/1.73 m^2 >60.0   Glucose 70 - 110 mg/dL 109   Calcium 8.7 - 10.5 mg/dL 8.4 (L)   Magnesium 1.6 - 2.6 mg/dL 1.5 (L)   Alkaline Phosphatase 55 - 135 U/L 134   PROTEIN TOTAL 6.0 - 8.4 g/dL 6.0   Albumin 3.5 - 5.2 g/dL 2.4 (L)   BILIRUBIN TOTAL 0.1 - 1.0 mg/dL 0.4   AST 10 - 40 U/L 24   ALT 10 - 44 U/L 17   Beta-Hydroxybutyrate 0.0 - 0.5 mmol/L 0.1   (L): Data is abnormally low      Microbiology:   Microbiology Results (last 7 days)     Procedure Component Value Units Date/Time    Blood culture x two cultures. Draw prior to antibiotics. [[id number]] Collected: 10/05/22 1513    Order Status: Completed Specimen: Blood from Peripheral, Forearm, Right Updated: 10/10/22 1612     Blood Culture, Routine No growth after 5 days.    Narrative:      Aerobic and anaerobic    Blood culture x two cultures. Draw prior to  antibiotics. [[id number]]  (Abnormal) Collected: 10/05/22 1505    Order Status: Completed Specimen: Blood from Peripheral, Forearm, Left Updated: 10/08/22 1005     Blood Culture, Routine Gram stain kasey bottle: Gram positive rods      Results called to and read back by:Mini Muniz RN 10/06/2022 02:39      Gram stain aer bottle: Gram positive rods      Positive results previously called 10/06/2022  11:04      BACILLUS SPECIES  Organism is a probable contaminant      Narrative:      Aerobic and anaerobic    Urine culture [[id number]] Collected: 10/06/22 1245    Order Status: Completed Specimen: Urine Updated: 10/08/22 0242     Urine Culture, Routine Multiple organisms isolated. None in predominance.  Repeat if      clinically necessary.    Narrative:      Specimen Source->Urine    Urine culture [[id number]] Collected: 10/05/22 2151    Order Status: Completed Specimen: Urine Updated: 10/07/22 0413     Urine Culture, Routine Multiple organisms isolated. None in predominance.  Repeat if      clinically necessary.    Narrative:      Specimen Source->Urine    Blood culture #1 **CANNOT BE ORDERED STAT** [[id number]]     Order Status: Canceled Specimen: Blood     Blood culture #2 **CANNOT BE ORDERED STAT** [[id number]]     Order Status: Canceled Specimen: Blood           Radiology:   Imaging Results           CT Abdomen Pelvis  Without Contrast (Final result)  Result time 10/05/22 21:13:33    Final result by Camacho Leon MD (10/05/22 21:13:33)                 Impression:      1. Markedly distended rectum with impacted fecal matter with associated wall thickening and adjacent fat stranding.  Findings suggestive of stercoral proctitis.  No associated bowel obstruction.  2. New L1 vertebral body superior endplate compression deformity and left rib 7 fracture.  3. Additional findings as detailed above.  This report was flagged in Epic as abnormal.    Electronically signed by resident: Lucero  Cliff  Date:    10/05/2022  Time:    19:02    Electronically signed by: Camacho Leon MD  Date:    10/05/2022  Time:    21:13             Narrative:    EXAMINATION:  CT ABDOMEN PELVIS WITHOUT CONTRAST    CLINICAL HISTORY:  Abdominal abscess/infection suspected;    TECHNIQUE:  5.0 mm axial CT images of the abdomen and pelvis were obtained via helical, multi detector CT technique.  No intravenous contrast material was administered.    COMPARISON:  CT renal stone study 05/28/2022    CT abdomen pelvis 04/13/2022    CT chest 04/18/2022    FINDINGS:  Study significantly degraded by artifact.    Evaluation for solid organ pathology is limited due to lack of intravenous contrast.    Heart: Postoperative changes of LAD stent.    Lung Bases: Significant respiratory motion.  Advanced centrilobular and paraseptal emphysema and minimal dependent atelectasis.    Liver: Normal in size.  No focal lesions.    Gallbladder: High-density material within the gallbladder, possibly vicarious excretion of contrast.  No evidence for cholecystitis.    Bile Ducts: No intra or extrahepatic biliary ductal dilation.    Pancreas: Appears atrophic.  No pancreatic mass lesion or peripancreatic inflammatory change.    Spleen: Normal.    Adrenals: Normal.    Genitourinary: Normal in size and location.  Bilateral simple renal cysts.  No nephrolithiasis, or hydroureteronephrosis.    Right perinephric stranding has improved from prior study.  Previously seen right hydroureter and hydronephrosis has essentially resolved.  Stable left perinephric stranding.    Bladder demonstrates smooth contours.  Focal posterior bladder wall thickening likely reactive secondary to mass effect from rectal fecal impaction.    Reproductive organs: Unremarkable.    GI tract/Mesentery: Stomach is normal appearance.  Visualized loops of small bowel are normal in caliber without evidence for inflammation or obstruction.    Rectum is markedly distended by impacted fecal matter  to 10 cm in diameter in keeping with fecaloma.  Associated wall thickening with adjacent fat stranding and presacral stranding.  The remaining loops of large bowel appear unremarkable.    Appendix is unremarkable.    Scattered prominent periaortic lymph nodes without pathologic lymphadenopathy.    Trace fluid in the pericolic gutters bilaterally improved from prior study.    Abdominal wall: Unremarkable.    Vasculature: Abdominal aorta is normal in caliber with mild calcific atherosclerosis.    Bones: Diffuse osteopenia. Multilevel degenerative changes visualized spine most severe at L5-S1. L1 vertebral body superior endplate compression deformity which is new from renal stone study 05/28/2022.   Left rib 7 fracture with sclerotic changes which is new from chest CT 04/18/2022.                               CT Head Without Contrast (Final result)  Result time 10/05/22 21:12:34    Final result by Camacho Leon MD (10/05/22 21:12:34)                 Impression:      No acute infarct or intracranial hemorrhage.    Extensive dural venous sinus pneumocephalus.  Findings are favored to be benign in etiology.  Differential considerations include intravenous induced pneumocephalus or barotrauma (i.e. nose blowing).  Septic thrombosis or craniofacial trauma felt less likely.    Unchanged high right parietal scalp thickening/induration likely in keeping with reported history of actinic keratosis per primary team.  Correlate for scalp hematoma.    Stable multifocal areas of encephalomalacia likely reflecting sequela of remote infarcts.  Stable chronic microvascular ischemic change and volume loss.    Additional findings in the body of the report.    Electronically signed by resident: Oz Galvez  Date:    10/05/2022  Time:    18:46    Electronically signed by: Camacho Leon MD  Date:    10/05/2022  Time:    21:12             Narrative:    EXAMINATION:  CT HEAD WITHOUT CONTRAST    CLINICAL HISTORY:  Head trauma, minor (Age  >= 65y);    TECHNIQUE:  Low dose axial CT images obtained throughout the head without the use of intravenous contrast.  Axial, sagittal and coronal reconstructions were performed.    COMPARISON:  CT head 06/30/2022.    FINDINGS:  Stable moderate generalized cerebral volume loss with compensatory enlargement of the ventricles, sulci, and cisterns.    Stable moderate-large sized right frontotemporal encephalomalacia..  Stable small area of encephalomalacia within the left anterior frontal lobe near the interhemispheric fissure.  Stable small sized area of encephalomalacia within the left cerebellar hemisphere.  Few additional scattered remote lacunar infarcts identified elsewhere as well appears stable.    Advanced patchy hypoattenuation in the supratentorial white matter, nonspecific but most likely reflecting chronic microvascular ischemic changes.    Chronic focal defect within the high left parietal bone near the vertex.  Multiple prominent arachnoid granulations within the inner calvarium, similar to priors.  There is scattered gas in the midline and bilateral dural venous sinuses, most extensively involving the superior sagittal sinus and extending anteriorly.  Foci of gas extend throughout the entire dural venous sinuses with involvement of the bilateral cavernous sinus as well.    Unchanged right parietal scalp thickening/induration near vertex.  Mastoid air cells and paranasal sinuses are clear.  Scattered atherosclerotic calcification about the skull base.                               X-Ray Chest AP Portable (Final result)  Result time 10/05/22 15:23:48    Final result by Oz Guerra MD (10/05/22 15:23:48)                 Impression:      No acute findings      Electronically signed by: Oz Guerra MD  Date:    10/05/2022  Time:    15:23             Narrative:    EXAMINATION:  XR CHEST AP PORTABLE    CLINICAL HISTORY:  Sepsis;    TECHNIQUE:  Single frontal view of the chest was  performed.    COMPARISON:  05/16/2022    FINDINGS:  Cardiac size is normal and lungs are clear.  No infiltrate is seen.                                  Pending Diagnostic Studies:     Procedure Component Value Units Date/Time    Beta - Hydroxybutyrate, Serum [[id number]] Collected: 10/05/22 1602    Order Status: Sent Lab Status: In process Updated: 10/05/22 1603    Specimen: Blood          Medications:  Reconciled Home Medications:      Medication List      START taking these medications    amiodarone 200 MG Tab  Commonly known as: PACERONE  Take 1 tablet (200 mg total) by mouth once daily.     ergocalciferol 50,000 unit Cap  Commonly known as: ERGOCALCIFEROL  Take 1 capsule (50,000 Units total) by mouth every 7 days.     gabapentin 100 MG capsule  Commonly known as: NEURONTIN  Take 1 capsule (100 mg total) by mouth 2 (two) times daily.        CONTINUE taking these medications    atorvastatin 40 MG tablet  Commonly known as: LIPITOR  Take 1 tablet (40 mg total) by mouth once daily.     blood sugar diagnostic Strp  1 strip by Misc.(Non-Drug; Combo Route) route 2 (two) times a day.     cetirizine 10 MG tablet  Commonly known as: ZYRTEC  Take 1 tablet (10 mg total) by mouth every evening.     clopidogreL 75 mg tablet  Commonly known as: PLAVIX  Take 75 mg by mouth once daily.     diclofenac sodium 1 % Gel  Commonly known as: VOLTAREN  Apply 4 g topically 3 (three) times daily. Apply to sacrun closed skin/buttocks     docusate sodium 100 MG capsule  Commonly known as: COLACE  Take 100 mg by mouth once daily at 6am.     ELIQUIS 5 mg Tab  Generic drug: apixaban  Take 1 tablet (5 mg total) by mouth 2 (two) times daily.     * glucose 4 GM chewable tablet  Take 4 tablets (16 g total) by mouth as needed for Low blood sugar (50 - 70).     * glucose 4 GM chewable tablet  Take 6 tablets (24 g total) by mouth as needed for Low blood sugar (< 50).     lacosamide 100 mg Tab  Commonly known as: VIMPAT  Take 1 tablet (100 mg total)  "by mouth every 12 (twelve) hours.     lancets Misc  Commonly known as: ONETOUCH ULTRASOFT LANCETS  1 lancet by Misc.(Non-Drug; Combo Route) route 2 (two) times a day.     METAMUCIL (WITH SUGAR) 3.4 gram packet  Generic drug: psyllium husk (with sugar)  Take 1 packet by mouth 2 (two) times daily.     MULTIVITAMIN ORAL  Take 1 tablet by mouth once daily.     nitroGLYCERIN 0.4 MG SL tablet  Commonly known as: NITROSTAT  Place 1 tablet (0.4 mg total) under the tongue every 5 (five) minutes as needed for Chest pain.     pantoprazole 40 MG tablet  Commonly known as: PROTONIX  Take 1 tablet (40 mg total) by mouth once daily.     * pen needle, diabetic 30 gauge x 5/16" Ndle  Commonly known as: PEN NEEDLE  1 Units by Misc.(Non-Drug; Combo Route) route 3 (three) times daily.     * BD ULTRA-FINE SHORT PEN NEEDLE 31 gauge x 5/16" Ndle  Generic drug: pen needle, diabetic  3 (three) times daily.     polycarbophil 625 mg tablet  Commonly known as: FIBERCON  Take 1 tablet by mouth once daily.     sertraline 50 MG tablet  Commonly known as: ZOLOFT  Take 50 mg by mouth once daily.     sucralfate 1 gram tablet  Commonly known as: CARAFATE  Take 1 g by mouth 3 (three) times daily before meals.     tamsulosin 0.4 mg Cap  Commonly known as: FLOMAX  Take 1 capsule (0.4 mg total) by mouth every evening.         * This list has 4 medication(s) that are the same as other medications prescribed for you. Read the directions carefully, and ask your doctor or other care provider to review them with you.            STOP taking these medications    LANTUS SOLOSTAR U-100 INSULIN SUBQ     losartan 25 MG tablet  Commonly known as: COZAAR     metoprolol succinate 50 MG 24 hr tablet  Commonly known as: TOPROL-XL            Indwelling Lines/Drains at time of discharge:   Lines/Drains/Airways     None                 Time spent on the discharge of patient: 45 minutes         Israel Escamilla MD  Department of Hospital Medicine  [facility]  "

## 2022-11-29 ENCOUNTER — TELEPHONE (OUTPATIENT)
Dept: FAMILY MEDICINE | Facility: CLINIC | Age: 77
End: 2022-11-29
Payer: MEDICARE

## 2022-11-29 ENCOUNTER — OFFICE VISIT (OUTPATIENT)
Dept: NEUROLOGY | Facility: CLINIC | Age: 77
End: 2022-11-29
Payer: MEDICARE

## 2022-11-29 VITALS
HEIGHT: 74 IN | BODY MASS INDEX: 21.05 KG/M2 | SYSTOLIC BLOOD PRESSURE: 114 MMHG | WEIGHT: 164 LBS | DIASTOLIC BLOOD PRESSURE: 68 MMHG | HEART RATE: 75 BPM

## 2022-11-29 DIAGNOSIS — I10 ESSENTIAL HYPERTENSION: Chronic | ICD-10-CM

## 2022-11-29 DIAGNOSIS — I63.442 CEREBROVASCULAR ACCIDENT (CVA) DUE TO EMBOLISM OF LEFT CEREBELLAR ARTERY: ICD-10-CM

## 2022-11-29 DIAGNOSIS — E11.69 DYSLIPIDEMIA ASSOCIATED WITH TYPE 2 DIABETES MELLITUS: ICD-10-CM

## 2022-11-29 DIAGNOSIS — Z79.4 TYPE 2 DIABETES MELLITUS WITH HYPERGLYCEMIA, WITH LONG-TERM CURRENT USE OF INSULIN: Primary | Chronic | ICD-10-CM

## 2022-11-29 DIAGNOSIS — R29.898 LEFT ARM WEAKNESS: ICD-10-CM

## 2022-11-29 DIAGNOSIS — I21.4 NSTEMI (NON-ST ELEVATED MYOCARDIAL INFARCTION): Chronic | ICD-10-CM

## 2022-11-29 DIAGNOSIS — R29.810 FACIAL DROOP: ICD-10-CM

## 2022-11-29 DIAGNOSIS — Z95.5 STENTED CORONARY ARTERY: ICD-10-CM

## 2022-11-29 DIAGNOSIS — I25.110 CORONARY ARTERY DISEASE INVOLVING NATIVE CORONARY ARTERY OF NATIVE HEART WITH UNSTABLE ANGINA PECTORIS: Chronic | ICD-10-CM

## 2022-11-29 DIAGNOSIS — Z86.73 HISTORY OF EMBOLIC STROKE: ICD-10-CM

## 2022-11-29 DIAGNOSIS — E11.65 TYPE 2 DIABETES MELLITUS WITH HYPERGLYCEMIA, WITH LONG-TERM CURRENT USE OF INSULIN: Primary | Chronic | ICD-10-CM

## 2022-11-29 DIAGNOSIS — I63.411 CEREBROVASCULAR ACCIDENT (CVA) DUE TO EMBOLISM OF RIGHT MIDDLE CEREBRAL ARTERY: ICD-10-CM

## 2022-11-29 DIAGNOSIS — Z79.01 CHRONIC ANTICOAGULATION: ICD-10-CM

## 2022-11-29 DIAGNOSIS — I48.0 PAROXYSMAL ATRIAL FIBRILLATION: Chronic | ICD-10-CM

## 2022-11-29 DIAGNOSIS — R29.6 FREQUENT FALLS: ICD-10-CM

## 2022-11-29 DIAGNOSIS — E78.5 DYSLIPIDEMIA ASSOCIATED WITH TYPE 2 DIABETES MELLITUS: ICD-10-CM

## 2022-11-29 DIAGNOSIS — I69.30 HISTORY OF CEREBROVASCULAR ACCIDENT (CVA) WITH RESIDUAL DEFICIT: ICD-10-CM

## 2022-11-29 PROCEDURE — 99999 PR PBB SHADOW E&M-EST. PATIENT-LVL V: CPT | Mod: PBBFAC,,, | Performed by: STUDENT IN AN ORGANIZED HEALTH CARE EDUCATION/TRAINING PROGRAM

## 2022-11-29 PROCEDURE — 99215 PR OFFICE/OUTPT VISIT, EST, LEVL V, 40-54 MIN: ICD-10-PCS | Mod: S$PBB,,, | Performed by: STUDENT IN AN ORGANIZED HEALTH CARE EDUCATION/TRAINING PROGRAM

## 2022-11-29 PROCEDURE — 99215 OFFICE O/P EST HI 40 MIN: CPT | Mod: S$PBB,,, | Performed by: STUDENT IN AN ORGANIZED HEALTH CARE EDUCATION/TRAINING PROGRAM

## 2022-11-29 PROCEDURE — 99999 PR PBB SHADOW E&M-EST. PATIENT-LVL V: ICD-10-PCS | Mod: PBBFAC,,, | Performed by: STUDENT IN AN ORGANIZED HEALTH CARE EDUCATION/TRAINING PROGRAM

## 2022-11-29 PROCEDURE — 99215 OFFICE O/P EST HI 40 MIN: CPT | Mod: PBBFAC | Performed by: STUDENT IN AN ORGANIZED HEALTH CARE EDUCATION/TRAINING PROGRAM

## 2022-11-29 RX ORDER — IBUPROFEN 400 MG/1
TABLET ORAL
Status: ON HOLD | COMMUNITY
Start: 2022-08-17 | End: 2023-02-01 | Stop reason: HOSPADM

## 2022-11-29 RX ORDER — ONDANSETRON 4 MG/1
4 TABLET, ORALLY DISINTEGRATING ORAL EVERY 6 HOURS PRN
Status: ON HOLD | COMMUNITY
Start: 2022-02-17 | End: 2023-03-01 | Stop reason: HOSPADM

## 2022-11-29 RX ORDER — METOPROLOL SUCCINATE 25 MG/1
25 TABLET, EXTENDED RELEASE ORAL DAILY
Status: ON HOLD | COMMUNITY
End: 2024-03-01 | Stop reason: HOSPADM

## 2022-11-29 NOTE — PATIENT INSTRUCTIONS
- Continue Eliquis 5 mg BID  - Atorvastatin 40 mg daily for your cholesterol  - continue that indefinitely   - Referral to Endocrinology  - Continue good blood pressure control   - Mediterranean Diet Recommendations    Eat primarily plant-based foods, such as fruits and vegetables, whole grains, legumes (beans) and nuts.  Limit refined carbohydrates (white pasta, bread, rice).  Replace butter with healthy fats such as olive oil.  Use herbs and spices instead of salt to flavor foods.  Limit red meat and processed meats to no more than a few times a month.  Avoid sugary sodas, bakery goods, and sweets.  Eat fish and poultry at least twice a week.  Get plenty of exercise (150 minutes per week).    Adopted from Dayanara morelos al, NEJM, 2018.

## 2022-11-29 NOTE — PROGRESS NOTES
[facility]rology [facility]  Initial Consult    Patient Name: Kamar Muñoz  MRN: [id number]    CC: Embolic infarct, CE    HPI: Kamar Muñoz is a 77 y.o. R-handed male w/ PMH significant for HTN, HLD,DM II w/ peripheral neuropathy, CAD s/p stents (Plavix), Afib on AC (Apixaban), NSTEMI, Cervical radiculopathy, Reported Hx of seizures presenting as referral status-post hospital admission on 1/12/2021.  Synopses of events.   1/9 - Admitted due to CP and diagnosed w/ STEMI @ [facility] - taken to cath lab, 100% occlusion of RCA, s/p PCI  1/10 - reported dizziness while inpatient, had a several falls as well, and now does report that he also feels he had weakness on the L  1/11 - discharged home   1/12 - presented to OSH w/ LFD and dysarthria. Found to have  an acute or subacute infarctions within the right frontal lobe and the left cerebellar hemisphere w/ petechial hemorrhagic transformation noted in the R frontal lobe infarct. No intervention.     Initially discharged home w/ HH and NIHSS 3.   Had a complicated year w/ multiple admissions to the hospital for AMS/ Hyperglycemia/Metabolic Encephalopathy and falls   Currently lives in the NH.   - Wife passed away while in NH.  Still has fall at the NH - feels weak, passes out, diaphoretic before the falls   - Symptoms are not secondary to positional changes    - Can happen at any time, denies mechanical falls   L-side is much better - reports improvement w/ therapy   - Pending the resumption of PT/OT in mid December   No other episodes of focal weakness  Speech is fine  No episodes of numbness    Just on Eliquis at this time. Antiplatelets have been stopped.     Brain Imaging  MRI Brain 1/12/2022    Vessel Imaging  MRA H/N 1/12/2022  Right anterior circulation:No definite evidence of flow-limiting stenosis.  Left anterior circulation: No definite evidence of flow-limiting stenosis.Left ophthalmic artery is patent.  Posterior circulation: Left vertebral artery, as  above.  No definite flow-limiting stenosis of the right vertebral artery or of the basilar artery.  There is no significant PCA stenosis. Posterior inferior cerebellar arteries patent at origin.  Anterior and posterior communicating arteries: The anterior and both posterior communicating arteries are visualized.    Cardiac Evaluation  ECHO 2022  There is abnormal septal wall motion.  The left ventricle is normal in size with low normal systolic function.  The estimated ejection fraction is 50%.  Normal left ventricular diastolic function.  Mild left atrial enlargement.  Normal right ventricular size with normal right ventricular systolic function.  Mild mitral regurgitation.  There are segmental left ventricular wall motion abnormalities.  Mild tricuspid regurgitation.  Elevated central venous pressure (15 mmHg).    Relevant Lab work   Recent Labs   Lab 22  1213 22  1512 10/05/22  1724   Hemoglobin A1C  --    < > 10.6 H   LDL Cholesterol 89.0  --   --    HDL 50  --   --    Triglycerides 215 H  --   --    Cholesterol 182  --   --     < > = values in this interval not displayed.         NIH Stroke Scale:    Level of Consciousness: 0 - alert  LOC Questions: 0 - answers both correctly  LOC Commands: 0 - performs both correctly  Best Gaze: 0 - normal  Visual: 0 - no visual loss  Facial Palsy: 1 - minor  Motor Left Arm: 0 - no drift  Motor Right Arm: 0 - no drift  Motor Left Le - no drift  Motor Right Le - no drift  Limb Ataxia: 0 - absent  Sensory: 0 - normal  Best Language: 0 - no aphasia  Dysarthria: 0 - normal articulation  Extinction and Inattention: 0 - no neglect  NIH Stroke Scale Total: 1       Review of Systems:  General: No fevers, chills  Eyes: No changes in vision  ENT: No changes in hearing  Respiratory: No SOB  CV: No chest pain, palpitations  GI: No diarrhea, blood in stool  Urinary: No dysuria, hematuria  Skin: No rashes  Neurological: No weakness, confusion  Psychiatric: No  "auditory nor visual hallucinations      Past Medical History  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    Hyperlipidemia     Hypertension     Kidney stone     Migraine headache     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 08/02/2012    STEMI involving right coronary artery 01/09/2022    Type II or unspecified type diabetes mellitus with neurological manifestations, uncontrolled(250.62) 03/08/2013    Urinary tract infection        Medications    Current Outpatient Medications:     acetaminophen (TYLENOL) 650 MG TbSR, Take 1 tablet (650 mg total) by mouth every 6 (six) hours as needed (pain)., Disp: 40 tablet, Rfl: 0    amiodarone (PACERONE) 200 MG Tab, Take 1 tablet (200 mg total) by mouth once daily., Disp: 90 tablet, Rfl: 3    apixaban (ELIQUIS) 5 mg Tab, Take 1 tablet (5 mg total) by mouth 2 (two) times daily., Disp: 60 tablet, Rfl: 5    atorvastatin (LIPITOR) 40 MG tablet, Take 1 tablet (40 mg total) by mouth once daily., Disp: 90 tablet, Rfl: 3    BD ULTRA-FINE SHORT PEN NEEDLE 31 gauge x 5/16" Ndle, 3 (three) times daily., Disp: , Rfl:     blood sugar diagnostic Strp, 1 strip by Misc.(Non-Drug; Combo Route) route 2 (two) times a day., Disp: 200 strip, Rfl: 6    cetirizine (ZYRTEC) 10 MG tablet, Take 1 tablet (10 mg total) by mouth every evening., Disp: , Rfl: 0    diclofenac sodium (VOLTAREN) 1 % Gel, Apply 4 g topically 3 (three) times daily. Apply to sacrun closed skin/buttocks, Disp: , Rfl:     docusate sodium (COLACE) 100 MG capsule, Take 100 mg by mouth once daily at 6am., Disp: , Rfl:     ergocalciferol (ERGOCALCIFEROL) 50,000 unit Cap, Take 1 capsule (50,000 Units total) by mouth every 7 days., Disp: 12 capsule, Rfl: 3    gabapentin (NEURONTIN) 100 MG capsule, Take 1 capsule (100 mg total) by mouth 2 (two) " "times daily., Disp: 60 capsule, Rfl: 11    glucose 4 GM chewable tablet, Take 4 tablets (16 g total) by mouth as needed for Low blood sugar (50 - 70)., Disp: , Rfl: 12    glucose 4 GM chewable tablet, Take 6 tablets (24 g total) by mouth as needed for Low blood sugar (< 50)., Disp: , Rfl: 12    ibuprofen (ADVIL,MOTRIN) 400 MG tablet, Take by mouth., Disp: , Rfl:     insulin aspart U-100 (NOVOLOG) 100 unit/mL (3 mL) InPn pen, Inject 1-10 Units into the skin before meals and at bedtime as needed (Hyperglycemia)., Disp: 30 mL, Rfl: 3    insulin aspart U-100 (NOVOLOG) 100 unit/mL (3 mL) InPn pen, Inject 15 Units into the skin 3 (three) times daily with meals., Disp: 162 mL, Rfl: 0    insulin glargine,hum.rec.anlog (LANTUS SOLOSTAR U-100 INSULIN SUBQ), Inject 12 Units into the skin 2 (two) times a day., Disp: , Rfl:     lacosamide (VIMPAT) 100 mg Tab, Take 1 tablet (100 mg total) by mouth every 12 (twelve) hours., Disp: 60 tablet, Rfl: 11    lancets (ONETOUCH ULTRASOFT LANCETS) Misc, 1 lancet by Misc.(Non-Drug; Combo Route) route 2 (two) times a day., Disp: 200 each, Rfl: 6    losartan (COZAAR) 25 MG tablet, Take 25 mg by mouth once daily., Disp: , Rfl:     MAGNESIUM ORAL, Take 400 mg by mouth once., Disp: , Rfl:     metoprolol succinate (TOPROL-XL) 50 MG 24 hr tablet, Take 50 mg by mouth once daily., Disp: , Rfl:     MULTIVITAMIN ORAL, Take 1 tablet by mouth once daily. , Disp: , Rfl:     nitroGLYCERIN (NITROSTAT) 0.4 MG SL tablet, Place 1 tablet (0.4 mg total) under the tongue every 5 (five) minutes as needed for Chest pain., Disp: 25 tablet, Rfl: 11    ondansetron (ZOFRAN-ODT) 4 MG TbDL, Take 4 mg by mouth every 6 (six) hours as needed., Disp: , Rfl:     pantoprazole (PROTONIX) 40 MG tablet, Take 1 tablet (40 mg total) by mouth once daily., Disp: 30 tablet, Rfl: 11    pen needle, diabetic (PEN NEEDLE) 30 gauge x 5/16" Ndle, 1 Units by Misc.(Non-Drug; Combo Route) route 3 (three) times daily., Disp: 100 each, Rfl: 3    " polycarbophil (FIBERCON) 625 mg tablet, Take 1 tablet by mouth once daily. , Disp: , Rfl:     psyllium husk, with sugar, (METAMUCIL, WITH SUGAR,) 3.4 gram packet, Take 1 packet by mouth 2 (two) times daily., Disp: , Rfl:     sertraline (ZOLOFT) 50 MG tablet, Take 50 mg by mouth once daily., Disp: , Rfl:     sucralfate (CARAFATE) 1 gram tablet, Take 1 g by mouth 3 (three) times daily before meals., Disp: , Rfl:     tamsulosin (FLOMAX) 0.4 mg Cap, Take 1 capsule (0.4 mg total) by mouth every evening., Disp: , Rfl: 0    clopidogreL (PLAVIX) 75 mg tablet, Take 75 mg by mouth once daily., Disp: , Rfl:     insulin detemir U-100 (LEVEMIR FLEXTOUCH) 100 unit/mL (3 mL) SubQ InPn pen, Inject 6 Units into the skin 2 (two) times daily., Disp: 3.6 mL, Rfl: 0  Any other notable medications as documented in HPI    Allergies  Review of patient's allergies indicates:   Allergen Reactions    Iodine      Other reaction(s): swelling  Other reaction(s): Itching  Other reaction(s): Rash       Social History  Social History     Socioeconomic History    Marital status:    Tobacco Use    Smoking status: Former     Packs/day: 1.50     Years: 25.00     Pack years: 37.50     Types: Cigarettes     Quit date: 1983     Years since quittin.9    Smokeless tobacco: Never   Substance and Sexual Activity    Alcohol use: No    Drug use: No    Sexual activity: Yes     Partners: Female   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     Social Determinants of Health     Financial Resource Strain: Low Risk     Difficulty of Paying Living Expenses: Not hard at all   Food Insecurity: No Food Insecurity    Worried About Running Out of Food in the Last Year: Never true    Ran Out of Food in the Last Year: Never true   Transportation Needs: No Transportation Needs    Lack of Transportation (Medical): No    Lack of Transportation (Non-Medical): No   Housing Stability: Low Risk     Unable to Pay for  "Housing in the Last Year: No    Number of Places Lived in the Last Year: 1    Unstable Housing in the Last Year: No     Any other notable Social History as documented in HPI.    Family History  Family History   Problem Relation Age of Onset    Diabetes Father     Prostate cancer Neg Hx     Kidney disease Neg Hx      Any other notable FMH as documented in HPI.    Physical Exam  /68   Pulse 75   Ht 6' 2" (1.88 m)   Wt 74.4 kg (164 lb)   BMI 21.06 kg/m²     General: Well-developed, well-groomed. No apparent distress  HENT: Normocephalic, atraumatic.    Cardiovascular: Irregularly irregular rhythm.   Chest: No audible wheezes, stridor, ronchi appreciated.  Musculoskeletal: No peripheral edema    Neurologic Exam: The patient is awake, alert and oriented to person, place, time and situation. Attentive, vigilant during exam. Language is fluent. Naming & repetition intact, +2-step commands.  Fund of knowledge is appropriate. Well organized thoughts.  Cranial nerves:   CN II: Visual fields are full to confrontation. Pupils are 4 mm and briskly reactive to light.  CN III, IV, VI: EOMI, no nystagmus, no ptosis  CN V: Facial sensation is intact in all 3 divisions bilaterally.  CN VII: Mild L nasolabial fold flattening.   CN VIII: Hearing is normal bilaterally  CN IX, X: Palate elevates symmetrically. Phonation is normal.  CN XI: Head turning and shoulder shrug are intact  CN XII: Tongue is midline with normal movements and no atrophy.    Motor examination of all extremities demonstrates normal bulk and tone in all four limbs. There are no atrophy or fasciculations.      Left Right   Left Right   Deltoid 5/5 5/5  Hip Flexion 4+/5 54+5   Biceps 5/5 5/5  Hip Extension 5/5 5/5   Triceps 5/5 5/5  Knee Flexion 5/5 5/5   Wrist Ext 4+/5 5/5  Knee Extension 5/5 5/5   Finger Abd 4/5 5/5  Ankle dorsiflex 5/5 5/5       Ankle plantar flex 4/5 4/5       Sensory examination is normal light touch in BUE and BLE.  Romberg is " negative.  Sensation to light touch: intact in BUE and BLE    Gait: Deferred 2/2 patient's safety concerns     Coordination: No dysmetria with finger-to-nose. Rapid alternating movements and fine finger movements are intact.     Miscellaneous: R of vertex scalp lesion noted, patient states he has a dermatology appointment coming up. Erythema noted around the lesion.       Assessment and Plan  - Continue Eliquis 5 mg BID  - Atorvastatin 40 mg daily for your cholesterol  - continue that indefinitely   - Referral to Endocrinology  - Continue BP management as per PCP   Diagnosis/Etiology: Embolic stroke, multiple vascular territories, CE  Stroke Risk Factors:HTN, HLD,  DM II w/ peripheral neuropathy, CAD s/p stents ( plavix), Afib on AC ( apixaban), NSTEM      Recommendations:   Antiplatelet/Anticoagulation: Eliquis 5 mg BID  Lipid Management: Atorvastatin 40 mg QHS (long-term goal LDL < 70).   - Monitoring for liver dysfunction and myopathy is suggested for statins. To be addressed by PCP  Diabetes: Target hemoglobin A1c <7%, measured 2X/year or quarterly if not meeting goals   - Referral to Endocrinology to evaluate the DM regimen and for medication adjustments as HbA1c not at goal   Hypertension: Long term goal is normotension w/ target BP of less than 130/80 mmHg  Smoking: Goal is smoking cessation and encouragement not to start smoking in the future.   Diet: Discussed Mediterranean Diet recommendations (Adopted from Dayanara et al, NEJ, 2018.)  - Eat primarily plant-based foods, such as fruits and vegetables, whole grains, legumes (beans) and nuts  - Limit refined carbohydrates (white pasta, bread, rice).  - Replace butter with healthy fats such as olive oil.  - Use herbs and spices instead of salt to flavor foods.  - Limit red meat and processed meats to no more than a few times a month.  - Avoid sugary sodas, bakery goods, and sweets.  - Eat fish and poultry at least twice a week.  - Get plenty of exercise (150  minutes per week).    RTC in PRN    Problem List Items Addressed This Visit          Neuro    History of embolic stroke    Facial droop    Cerebrovascular accident (CVA) due to embolism of right middle cerebral artery    Cerebrovascular accident (CVA) due to embolism of left cerebellar artery       Cardiac/Vascular    Essential hypertension (Chronic)    Coronary artery disease involving native coronary artery of native heart with unstable angina pectoris (Chronic)    Paroxysmal atrial fibrillation (Chronic)    NSTEMI (non-ST elevated myocardial infarction) (Chronic)    Stented coronary artery    Dyslipidemia associated with type 2 diabetes mellitus    Relevant Medications    insulin glargine,hum.rec.anlog (LANTUS SOLOSTAR U-100 INSULIN SUBQ)       Hematology    Chronic anticoagulation       Endocrine    Type 2 diabetes mellitus with hyperglycemia, with long-term current use of insulin - Primary (Chronic)    Relevant Medications    insulin glargine,hum.rec.anlog (LANTUS SOLOSTAR U-100 INSULIN SUBQ)    Other Relevant Orders    Ambulatory referral/consult to Endocrinology       Orthopedic    Left arm weakness       Other    Frequent falls     Other Visit Diagnoses       History of cerebrovascular accident (CVA) with residual deficit                  Shira Hernandez MD  Vascular Neurology  [facility]  [address] [place] [address]

## 2022-12-01 PROBLEM — R29.6 FREQUENT FALLS: Status: ACTIVE | Noted: 2022-12-01

## 2022-12-01 PROBLEM — I63.411 CEREBROVASCULAR ACCIDENT (CVA) DUE TO EMBOLISM OF RIGHT MIDDLE CEREBRAL ARTERY: Status: ACTIVE | Noted: 2022-12-01

## 2022-12-01 PROBLEM — I63.442 CEREBROVASCULAR ACCIDENT (CVA) DUE TO EMBOLISM OF LEFT CEREBELLAR ARTERY: Status: ACTIVE | Noted: 2022-12-01

## 2022-12-08 ENCOUNTER — OFFICE VISIT (OUTPATIENT)
Dept: NEUROSURGERY | Facility: CLINIC | Age: 77
End: 2022-12-08
Payer: MEDICARE

## 2022-12-08 VITALS
DIASTOLIC BLOOD PRESSURE: 67 MMHG | HEIGHT: 74 IN | WEIGHT: 164 LBS | HEART RATE: 100 BPM | SYSTOLIC BLOOD PRESSURE: 123 MMHG | BODY MASS INDEX: 21.05 KG/M2

## 2022-12-08 DIAGNOSIS — S22.080A COMPRESSION FRACTURE OF T12 VERTEBRA, INITIAL ENCOUNTER: ICD-10-CM

## 2022-12-08 PROCEDURE — 99214 PR OFFICE/OUTPT VISIT, EST, LEVL IV, 30-39 MIN: ICD-10-PCS | Mod: S$PBB,,, | Performed by: PHYSICIAN ASSISTANT

## 2022-12-08 PROCEDURE — 99999 PR PBB SHADOW E&M-EST. PATIENT-LVL V: CPT | Mod: PBBFAC,,, | Performed by: PHYSICIAN ASSISTANT

## 2022-12-08 PROCEDURE — 99214 OFFICE O/P EST MOD 30 MIN: CPT | Mod: S$PBB,,, | Performed by: PHYSICIAN ASSISTANT

## 2022-12-08 PROCEDURE — 99215 OFFICE O/P EST HI 40 MIN: CPT | Mod: PBBFAC | Performed by: PHYSICIAN ASSISTANT

## 2022-12-08 PROCEDURE — 99999 PR PBB SHADOW E&M-EST. PATIENT-LVL V: ICD-10-PCS | Mod: PBBFAC,,, | Performed by: PHYSICIAN ASSISTANT

## 2022-12-08 RX ORDER — INSULIN LISPRO 100 [IU]/ML
4 INJECTION, SOLUTION INTRAVENOUS; SUBCUTANEOUS
Status: ON HOLD | COMMUNITY
Start: 2022-02-17 | End: 2023-03-01 | Stop reason: HOSPADM

## 2022-12-08 RX ORDER — METHOCARBAMOL 500 MG/1
500 TABLET, FILM COATED ORAL DAILY PRN
Status: ON HOLD | COMMUNITY
Start: 2022-11-16 | End: 2023-03-01 | Stop reason: HOSPADM

## 2022-12-08 NOTE — PROGRESS NOTES
"Neurosurgery  Established Patient    SUBJECTIVE:     History of Present Illness: Kamar Muñoz is a 77 y.o. male who presents for follow up of T12 and L1 compression fracture. Patient originally presented to ED on 10/25 after a ground level fall at his nursing home. He complained of back pain. He denied radicular pain, weakness, or saddle anesthesia. He was given a lumbar brace and Xrs upright and supine were without dynamic instability. He was given 6 week follow up. Unfortunately he was lost to follow up.     He is here today and complains of continued back pain. He continues to deny radicular symptoms or weakness. He denies new bowel/bladder issues. He reports he has urinary frequency at baseline. He is on plavix/ASA for history of stents in Jan 2022.       Review of patient's allergies indicates:   Allergen Reactions    Iodine      Other reaction(s): swelling  Other reaction(s): Itching  Other reaction(s): Rash       Current Outpatient Medications   Medication Sig Dispense Refill    insulin lispro 100 unit/mL injection Inject 4 Units into the skin.      acetaminophen (TYLENOL) 650 MG TbSR Take 1 tablet (650 mg total) by mouth every 6 (six) hours as needed (pain). 40 tablet 0    amiodarone (PACERONE) 200 MG Tab Take 1 tablet (200 mg total) by mouth once daily. 90 tablet 3    apixaban (ELIQUIS) 5 mg Tab Take 1 tablet (5 mg total) by mouth 2 (two) times daily. 60 tablet 5    atorvastatin (LIPITOR) 40 MG tablet Take 1 tablet (40 mg total) by mouth once daily. 90 tablet 3    BD ULTRA-FINE SHORT PEN NEEDLE 31 gauge x 5/16" Ndle 3 (three) times daily.      blood sugar diagnostic Strp 1 strip by Misc.(Non-Drug; Combo Route) route 2 (two) times a day. 200 strip 6    cetirizine (ZYRTEC) 10 MG tablet Take 1 tablet (10 mg total) by mouth every evening.  0    clopidogreL (PLAVIX) 75 mg tablet Take 75 mg by mouth once daily.      diclofenac sodium (VOLTAREN) 1 % Gel Apply 4 g topically 3 (three) times daily. Apply to " sacrun closed skin/buttocks      docusate sodium (COLACE) 100 MG capsule Take 100 mg by mouth once daily at 6am.      ergocalciferol (ERGOCALCIFEROL) 50,000 unit Cap Take 1 capsule (50,000 Units total) by mouth every 7 days. 12 capsule 3    gabapentin (NEURONTIN) 100 MG capsule Take 1 capsule (100 mg total) by mouth 2 (two) times daily. 60 capsule 11    glucose 4 GM chewable tablet Take 4 tablets (16 g total) by mouth as needed for Low blood sugar (50 - 70).  12    glucose 4 GM chewable tablet Take 6 tablets (24 g total) by mouth as needed for Low blood sugar (< 50).  12    ibuprofen (ADVIL,MOTRIN) 400 MG tablet Take by mouth.      insulin aspart U-100 (NOVOLOG) 100 unit/mL (3 mL) InPn pen Inject 1-10 Units into the skin before meals and at bedtime as needed (Hyperglycemia). 30 mL 3    insulin aspart U-100 (NOVOLOG) 100 unit/mL (3 mL) InPn pen Inject 15 Units into the skin 3 (three) times daily with meals. 162 mL 0    insulin detemir U-100 (LEVEMIR FLEXTOUCH) 100 unit/mL (3 mL) SubQ InPn pen Inject 6 Units into the skin 2 (two) times daily. 3.6 mL 0    insulin glargine,hum.rec.anlog (LANTUS SOLOSTAR U-100 INSULIN SUBQ) Inject 12 Units into the skin 2 (two) times a day.      lacosamide (VIMPAT) 100 mg Tab Take 1 tablet (100 mg total) by mouth every 12 (twelve) hours. 60 tablet 11    lancets (ONETOUCH ULTRASOFT LANCETS) Misc 1 lancet by Misc.(Non-Drug; Combo Route) route 2 (two) times a day. 200 each 6    losartan (COZAAR) 25 MG tablet Take 25 mg by mouth once daily.      MAGNESIUM ORAL Take 400 mg by mouth once.      methocarbamoL (ROBAXIN) 500 MG Tab Take 500 mg by mouth daily as needed.      metoprolol succinate (TOPROL-XL) 50 MG 24 hr tablet Take 50 mg by mouth once daily.      MULTIVITAMIN ORAL Take 1 tablet by mouth once daily.       nitroGLYCERIN (NITROSTAT) 0.4 MG SL tablet Place 1 tablet (0.4 mg total) under the tongue every 5 (five) minutes as needed for Chest pain. 25 tablet 11    ondansetron (ZOFRAN-ODT) 4  "MG TbDL Take 4 mg by mouth every 6 (six) hours as needed.      pantoprazole (PROTONIX) 40 MG tablet Take 1 tablet (40 mg total) by mouth once daily. 30 tablet 11    pen needle, diabetic (PEN NEEDLE) 30 gauge x 5/16" Ndle 1 Units by Misc.(Non-Drug; Combo Route) route 3 (three) times daily. 100 each 3    polycarbophil (FIBERCON) 625 mg tablet Take 1 tablet by mouth once daily.       psyllium husk, with sugar, (METAMUCIL, WITH SUGAR,) 3.4 gram packet Take 1 packet by mouth 2 (two) times daily.      sertraline (ZOLOFT) 50 MG tablet Take 50 mg by mouth once daily.      sucralfate (CARAFATE) 1 gram tablet Take 1 g by mouth 3 (three) times daily before meals.      tamsulosin (FLOMAX) 0.4 mg Cap Take 1 capsule (0.4 mg total) by mouth every evening.  0     No current facility-administered medications for this visit.       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    Hyperlipidemia     Hypertension     Kidney stone     Migraine headache     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 08/02/2012    STEMI involving right coronary artery 01/09/2022    Type II or unspecified type diabetes mellitus with neurological manifestations, uncontrolled(250.62) 03/08/2013    Urinary tract infection      Past Surgical History:   Procedure Laterality Date    BACK SURGERY      CATARACT EXTRACTION W/  INTRAOCULAR LENS IMPLANT Right     Per Dr Romero note 11/2018    COLONOSCOPY N/A 1/28/2019    Procedure: COLONOSCOPY Suprep;  Surgeon: Anh Johnson MD;  Location: [facility];  Service: Endoscopy;  Laterality: N/A;    ESOPHAGOGASTRODUODENOSCOPY N/A 9/15/2022    Procedure: EGD (ESOPHAGOGASTRODUODENOSCOPY);  Surgeon: Dashawn Evans MD;  Location: [facility];  Service: Endoscopy;  Laterality: N/A;    EYE SURGERY      HERNIA REPAIR      LEFT HEART " "CATHETERIZATION Left 2022    Procedure: CATHETERIZATION, HEART, LEFT;  Surgeon: Will Hurst III, MD;  Location: [facility] CATH LAB/EP;  Service: Cardiology;  Laterality: Left;    renal stones      SHOULDER OPEN ROTATOR CUFF REPAIR       Family History       Problem Relation (Age of Onset)    Diabetes Father          Social History     Socioeconomic History    Marital status:    Tobacco Use    Smoking status: Former     Packs/day: 1.50     Years: 25.00     Pack years: 37.50     Types: Cigarettes     Quit date: 1983     Years since quittin.9    Smokeless tobacco: Never   Substance and Sexual Activity    Alcohol use: No    Drug use: No    Sexual activity: Yes     Partners: Female   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     Social Determinants of Health     Financial Resource Strain: Low Risk     Difficulty of Paying Living Expenses: Not hard at all   Food Insecurity: No Food Insecurity    Worried About Running Out of Food in the Last Year: Never true    Ran Out of Food in the Last Year: Never true   Transportation Needs: No Transportation Needs    Lack of Transportation (Medical): No    Lack of Transportation (Non-Medical): No   Housing Stability: Low Risk     Unable to Pay for Housing in the Last Year: No    Number of Places Lived in the Last Year: 1    Unstable Housing in the Last Year: No       Review of Systems    OBJECTIVE:     Vital Signs  Pulse: 100  BP: 123/67  Pain Score:   7  Height: 6' 2" (188 cm)  Weight: 74.4 kg (164 lb)  Body mass index is 21.06 kg/m².    Neurosurgery Physical Exam  General: well developed, well nourished, no distress.   Head: normocephalic, atraumatic  Neurologic: Alert and oriented. Thought content appropriate.  GCS: Motor: 6/Verbal: 5/Eyes: 4 GCS Total: 15  Mental Status: Awake, Alert, Oriented x 4  Language: No aphasia  Speech: No dysarthria  Cranial nerves: face symmetric, tongue midline, CN II-XII grossly " intact.   Eyes: pupils equal, round, reactive to light with accommodation, EOMI.   Pulmonary: normal respirations, no signs of respiratory distress  Abdomen: soft, non-distended, not tender to palpation  Skin: Skin is warm, dry and intact.  Sensory: intact to light touch throughout    Motor Strength:Moves all extremities spontaneously with good tone.  Full strength upper and lower extremities. No abnormal movements seen.     Strength  Deltoids Triceps Biceps Wrist Extension Wrist Flexion Hand    Upper: R 5/5 5/5 5/5 5/5 5/5 5/5    L 5/5 5/5 5/5 5/5 5/5 5/5     Iliopsoas Quadriceps Knee  Flexion Tibialis  anterior Gastro- cnemius EHL   Lower: R 5/5 5/5 5/5 5/5 5/5 5/5    L 5/5 5/5 5/5 5/5 5/5 5/5     Midline TTP of lower lumbar spine     Diagnostic Results:  No updated imaging available for review    CT from 10/24/22 shows a mild acute compression fracture of superior endplate fo T12 and acute on chronic compression fracture of L1. There is a chronic compression fracture noted at L5    ASSESSMENT/PLAN:     Kamar Muñoz is a 77 y.o. male with known T12 and L1 compression fractures. The fractures are about 3 months old at this point. He continues to have lower back pain. He may be a candidate for kyphoplasty. I would like to obtain an updated MRI of the lumbar spine to assess the chronicity of the fractures, if they appear subacute we can further discuss kyphoplasty. He will require cardiac clearance prior to any surgical intervention. I will get him set up for a follow up once the MRI is complete.     Discussed with Dr. Spicer

## 2022-12-17 ENCOUNTER — HOSPITAL ENCOUNTER (OUTPATIENT)
Dept: RADIOLOGY | Facility: OTHER | Age: 77
Discharge: HOME | End: 2022-12-17
Attending: PHYSICIAN ASSISTANT
Payer: MEDICARE

## 2022-12-17 DIAGNOSIS — S22.080A COMPRESSION FRACTURE OF T12 VERTEBRA, INITIAL ENCOUNTER: ICD-10-CM

## 2022-12-17 PROCEDURE — 72148 MRI LUMBAR SPINE WITHOUT CONTRAST: ICD-10-PCS | Mod: 26,,, | Performed by: RADIOLOGY

## 2022-12-17 PROCEDURE — 72148 MRI LUMBAR SPINE W/O DYE: CPT | Mod: TC

## 2022-12-17 PROCEDURE — 72148 MRI LUMBAR SPINE W/O DYE: CPT | Mod: 26,,, | Performed by: RADIOLOGY

## 2022-12-19 ENCOUNTER — TELEPHONE (OUTPATIENT)
Dept: NEUROSURGERY | Facility: CLINIC | Age: 77
End: 2022-12-19
Payer: MEDICARE

## 2022-12-19 DIAGNOSIS — B99.9 INFECTION: Primary | ICD-10-CM

## 2022-12-19 NOTE — TELEPHONE ENCOUNTER
Left message on pt vm stating that I scheduled him for an appt with Dr. Spicer for 12/29 @ 2:15pm to come in to discuss a Kyphoplasty and to call office back with any questions.      Cardiology pre-op appt also scheduled for 1/11 @ 10:40am. (Will discuss appt with patient if he call back)

## 2022-12-19 NOTE — TELEPHONE ENCOUNTER
----- Message from Talia Mars PA-C sent at 12/19/2022  3:39 PM CST -----  Hey, can we also get him set up to see cardiology for clearance prior to kyphoplasty? He has a history of stents placed in Jan 2022.     Thanks,  Talia

## 2022-12-28 ENCOUNTER — TELEPHONE (OUTPATIENT)
Dept: NEUROSURGERY | Facility: CLINIC | Age: 77
End: 2022-12-28
Payer: MEDICARE

## 2022-12-28 NOTE — TELEPHONE ENCOUNTER
----- Message from Imelda Pérez sent at 12/28/2022  3:04 PM CST -----  Regarding: appt  Contact: Ashleigh [phone]  [facility] PT canceled appt. Caller is asking why PT appt was cancel and when can Pt be reschedule. Please call to discuss further.

## 2022-12-28 NOTE — TELEPHONE ENCOUNTER
Returned Brad call, he stated that he's unable to agree to a date or time due to being away from his desk currently. Brad stated that he will call back once he's back at his desk.      1/5 1:30pm appt with Dr. Nayan pablo on hold, awaiting call back

## 2022-12-29 ENCOUNTER — TELEPHONE (OUTPATIENT)
Dept: NEUROSURGERY | Facility: CLINIC | Age: 77
End: 2022-12-29
Payer: MEDICARE

## 2022-12-29 NOTE — TELEPHONE ENCOUNTER
----- Message from Shira Rojas RN sent at 12/29/2022  4:15 PM CST -----  Regarding: FW: call back  Contact: Ashleigh [phone]    ----- Message -----  From: Imelda Pérez  Sent: 12/29/2022   8:51 AM CST  To: , #  Subject: call back                                        [facility] Sindi in regards to PT possible appt. Caller states 01/12/23 would be a good day for Pt. Please call to discuss further.

## 2022-12-29 NOTE — TELEPHONE ENCOUNTER
Spoke with Ms. Trammell (nurse at nursing home) pt appt rescheduled for 1/12 @ 10am with Dr. Nayan Gordon pre-op appt will be made at visit

## 2023-01-12 ENCOUNTER — OFFICE VISIT (OUTPATIENT)
Dept: NEUROSURGERY | Facility: CLINIC | Age: 78
End: 2023-01-12
Payer: MEDICARE

## 2023-01-12 VITALS
SYSTOLIC BLOOD PRESSURE: 119 MMHG | DIASTOLIC BLOOD PRESSURE: 67 MMHG | WEIGHT: 164 LBS | BODY MASS INDEX: 21.05 KG/M2 | HEIGHT: 74 IN | HEART RATE: 73 BPM

## 2023-01-12 DIAGNOSIS — S32.010A CLOSED COMPRESSION FRACTURE OF BODY OF L1 VERTEBRA: ICD-10-CM

## 2023-01-12 DIAGNOSIS — S32.010G CLOSED COMPRESSION FRACTURE OF L1 LUMBAR VERTEBRA, WITH DELAYED HEALING, SUBSEQUENT ENCOUNTER: ICD-10-CM

## 2023-01-12 DIAGNOSIS — S22.080A COMPRESSION FRACTURE OF T12 VERTEBRA, INITIAL ENCOUNTER: Primary | ICD-10-CM

## 2023-01-12 PROCEDURE — 99999 PR PBB SHADOW E&M-EST. PATIENT-LVL V: CPT | Mod: PBBFAC,,, | Performed by: NEUROLOGICAL SURGERY

## 2023-01-12 PROCEDURE — 99214 PR OFFICE/OUTPT VISIT, EST, LEVL IV, 30-39 MIN: ICD-10-PCS | Mod: S$PBB,,, | Performed by: NEUROLOGICAL SURGERY

## 2023-01-12 PROCEDURE — 99214 OFFICE O/P EST MOD 30 MIN: CPT | Mod: S$PBB,,, | Performed by: NEUROLOGICAL SURGERY

## 2023-01-12 PROCEDURE — 99999 PR PBB SHADOW E&M-EST. PATIENT-LVL V: ICD-10-PCS | Mod: PBBFAC,,, | Performed by: NEUROLOGICAL SURGERY

## 2023-01-12 PROCEDURE — 99215 OFFICE O/P EST HI 40 MIN: CPT | Mod: PBBFAC | Performed by: NEUROLOGICAL SURGERY

## 2023-01-12 NOTE — H&P (VIEW-ONLY)
"Neurosurgery  Established Patient    SUBJECTIVE:     History of Present Illness:  Mr. Muñoz is a 77-year-old male who is seeing me today in follow-up.  His last neurosurgery clinic appointment was on December 8, 2022 with Talia Mars PA-C.  He originally presented to the emergency room in October 2022 after a ground level fall at his nursing home.  He complained of low back pain at that time.  He denied any radiating, weakness, or saddle anesthesia.  He was found to have T12 and L1 compression fractures.  He was given a TLSO brace for pain control.  He was lost to follow-up for some time.  At the time of his last clinic appointment, complained of continued low back pain.  This was worse with sitting and walking.  This was improved with lying down.  An MRI of the lumbar spine was ordered at that time to see if the fracture was still subacute to acute.  He is here today to see me in follow-up.  Currently, he continues to complain of low back pain.  It is nonradiating.  His pain is minimal when he is lying down..  It is fairly significant when he is sitting or attempting to walk.  He denies any weakness.  He denies any bowel or bladder incontinence.    Review of patient's allergies indicates:   Allergen Reactions    Iodine      Other reaction(s): swelling  Other reaction(s): Itching  Other reaction(s): Rash       Current Outpatient Medications   Medication Sig Dispense Refill    acetaminophen (TYLENOL) 650 MG TbSR Take 1 tablet (650 mg total) by mouth every 6 (six) hours as needed (pain). 40 tablet 0    amiodarone (PACERONE) 200 MG Tab Take 1 tablet (200 mg total) by mouth once daily. 90 tablet 3    apixaban (ELIQUIS) 5 mg Tab Take 1 tablet (5 mg total) by mouth 2 (two) times daily. 60 tablet 5    BD ULTRA-FINE SHORT PEN NEEDLE 31 gauge x 5/16" Ndle 3 (three) times daily.      blood sugar diagnostic Strp 1 strip by Misc.(Non-Drug; Combo Route) route 2 (two) times a day. 200 strip 6    cetirizine (ZYRTEC) 10 " MG tablet Take 1 tablet (10 mg total) by mouth every evening.  0    clopidogreL (PLAVIX) 75 mg tablet Take 75 mg by mouth once daily.      diclofenac sodium (VOLTAREN) 1 % Gel Apply 4 g topically 3 (three) times daily. Apply to sacrun closed skin/buttocks      docusate sodium (COLACE) 100 MG capsule Take 100 mg by mouth once daily at 6am.      ergocalciferol (ERGOCALCIFEROL) 50,000 unit Cap Take 1 capsule (50,000 Units total) by mouth every 7 days. 12 capsule 3    gabapentin (NEURONTIN) 100 MG capsule Take 1 capsule (100 mg total) by mouth 2 (two) times daily. 60 capsule 11    glucose 4 GM chewable tablet Take 4 tablets (16 g total) by mouth as needed for Low blood sugar (50 - 70).  12    glucose 4 GM chewable tablet Take 6 tablets (24 g total) by mouth as needed for Low blood sugar (< 50).  12    ibuprofen (ADVIL,MOTRIN) 400 MG tablet Take by mouth.      insulin aspart U-100 (NOVOLOG) 100 unit/mL (3 mL) InPn pen Inject 1-10 Units into the skin before meals and at bedtime as needed (Hyperglycemia). 30 mL 3    insulin aspart U-100 (NOVOLOG) 100 unit/mL (3 mL) InPn pen Inject 15 Units into the skin 3 (three) times daily with meals. 162 mL 0    insulin glargine,hum.rec.anlog (LANTUS SOLOSTAR U-100 INSULIN SUBQ) Inject 12 Units into the skin 2 (two) times a day.      insulin lispro 100 unit/mL injection Inject 4 Units into the skin.      lacosamide (VIMPAT) 100 mg Tab Take 1 tablet (100 mg total) by mouth every 12 (twelve) hours. 60 tablet 11    lancets (ONETOUCH ULTRASOFT LANCETS) Misc 1 lancet by Misc.(Non-Drug; Combo Route) route 2 (two) times a day. 200 each 6    losartan (COZAAR) 25 MG tablet Take 25 mg by mouth once daily.      MAGNESIUM ORAL Take 400 mg by mouth once.      methocarbamoL (ROBAXIN) 500 MG Tab Take 500 mg by mouth daily as needed.      metoprolol succinate (TOPROL-XL) 50 MG 24 hr tablet Take 50 mg by mouth once daily.      MULTIVITAMIN ORAL Take 1 tablet by mouth once daily.       ondansetron  "(ZOFRAN-ODT) 4 MG TbDL Take 4 mg by mouth every 6 (six) hours as needed.      pantoprazole (PROTONIX) 40 MG tablet Take 1 tablet (40 mg total) by mouth once daily. 30 tablet 11    pen needle, diabetic (PEN NEEDLE) 30 gauge x 5/16" Ndle 1 Units by Misc.(Non-Drug; Combo Route) route 3 (three) times daily. 100 each 3    polycarbophil (FIBERCON) 625 mg tablet Take 1 tablet by mouth once daily.       psyllium husk, with sugar, (METAMUCIL, WITH SUGAR,) 3.4 gram packet Take 1 packet by mouth 2 (two) times daily.      sertraline (ZOLOFT) 50 MG tablet Take 50 mg by mouth once daily.      sucralfate (CARAFATE) 1 gram tablet Take 1 g by mouth 3 (three) times daily before meals.      tamsulosin (FLOMAX) 0.4 mg Cap Take 1 capsule (0.4 mg total) by mouth every evening.  0    atorvastatin (LIPITOR) 40 MG tablet Take 1 tablet (40 mg total) by mouth once daily. 90 tablet 3    insulin detemir U-100 (LEVEMIR FLEXTOUCH) 100 unit/mL (3 mL) SubQ InPn pen Inject 6 Units into the skin 2 (two) times daily. 3.6 mL 0    nitroGLYCERIN (NITROSTAT) 0.4 MG SL tablet Place 1 tablet (0.4 mg total) under the tongue every 5 (five) minutes as needed for Chest pain. 25 tablet 11     No current facility-administered medications for this visit.       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    Hyperlipidemia     Hypertension     Kidney stone     Migraine headache     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 08/02/2012    STEMI involving right coronary artery 01/09/2022    Type II or unspecified type diabetes mellitus with neurological manifestations, uncontrolled(250.62) 03/08/2013    Urinary tract infection      Past Surgical History:   Procedure Laterality Date    BACK SURGERY      CATARACT EXTRACTION W/  INTRAOCULAR LENS IMPLANT Right     Per Dr Romero note " "2018    COLONOSCOPY N/A 2019    Procedure: COLONOSCOPY Suprep;  Surgeon: Anh Johnson MD;  Location: [facility] ENDO;  Service: Endoscopy;  Laterality: N/A;    ESOPHAGOGASTRODUODENOSCOPY N/A 9/15/2022    Procedure: EGD (ESOPHAGOGASTRODUODENOSCOPY);  Surgeon: Dashawn Eavns MD;  Location: [facility] ENDO;  Service: Endoscopy;  Laterality: N/A;    EYE SURGERY      HERNIA REPAIR      LEFT HEART CATHETERIZATION Left 2022    Procedure: CATHETERIZATION, HEART, LEFT;  Surgeon: Will Hurst III, MD;  Location: [facility] CATH LAB/EP;  Service: Cardiology;  Laterality: Left;    renal stones      SHOULDER OPEN ROTATOR CUFF REPAIR       Family History       Problem Relation (Age of Onset)    Diabetes Father          Social History     Socioeconomic History    Marital status:    Tobacco Use    Smoking status: Former     Packs/day: 1.50     Years: 25.00     Pack years: 37.50     Types: Cigarettes     Quit date: 1983     Years since quittin.0    Smokeless tobacco: Never   Substance and Sexual Activity    Alcohol use: No    Drug use: No    Sexual activity: Yes     Partners: Female   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     Social Determinants of Health     Financial Resource Strain: Low Risk     Difficulty of Paying Living Expenses: Not hard at all   Food Insecurity: No Food Insecurity    Worried About Running Out of Food in the Last Year: Never true    Ran Out of Food in the Last Year: Never true   Transportation Needs: No Transportation Needs    Lack of Transportation (Medical): No    Lack of Transportation (Non-Medical): No   Housing Stability: Low Risk     Unable to Pay for Housing in the Last Year: No    Number of Places Lived in the Last Year: 1    Unstable Housing in the Last Year: No       Review of Systems    OBJECTIVE:     Vital Signs  Pulse: 73  BP: 119/67  Pain Score:   3  Height: 6' 2" (188 cm)  Weight: 74.4 kg (164 lb 0.4 oz)  Body " mass index is 21.06 kg/m².    Physical Exam:  Vitals reviewed.    Constitutional: He appears well-developed and well-nourished. No distress.     Eyes: Pupils are equal, round, and reactive to light. Conjunctivae and EOM are normal.     Cardiovascular: Normal rate, regular rhythm, normal pulses and no edema.     Abdominal: Soft. Bowel sounds are normal.     Skin: Skin displays no rash on trunk and no rash on extremities. Skin displays no lesions on trunk and no lesions on extremities.     Psych/Behavior: He is alert. He is oriented to person, place, and time. He has a normal mood and affect.     Musculoskeletal: Gait is abnormal.        Neck: Range of motion is full. There is no tenderness. Muscle strength is 5/5. Tone is normal.        Back: Range of motion is full. There is no tenderness. Muscle strength is 5/5. Tone is normal.        Right Upper Extremities: Range of motion is full. There is no tenderness. Muscle strength is 5/5. Tone is normal.        Left Upper Extremities: Range of motion is full. There is no tenderness. Muscle strength is 5/5. Tone is normal.       Right Lower Extremities: Range of motion is full. There is no tenderness. Muscle strength is 5/5. Tone is normal.        Left Lower Extremities: Range of motion is full. There is no tenderness. Muscle strength is 5/5. Tone is normal.     Neurological:        Coordination: He has a normal Romberg Test, normal finger to nose coordination and normal tandem walking coordination.        Sensory: There is no sensory deficit in the trunk. There is no sensory deficit in the extremities.        DTRs: DTRs are DTRS NORMAL AND SYMMETRICnormal and symmetric. He displays no Babinski's sign on the right side. He displays no Babinski's sign on the left side.        Cranial nerves: Cranial nerve(s) II, III, IV, V, VI, VII, VIII, IX, X, XI and XII are intact.       Diagnostic Results:  He has an MRI of the lumbar spine available for review which I personally  reviewed.  This shows multilevel lumbar spondylosis.  He has a compression fracture at the L1 level with approximately 25% loss of height.  There is still significant STIR sequence signal changes within the L1 vertebral body.  There are no STIR sequence signal changes within the T12 body.    ASSESSMENT/PLAN:     Mr. Muñoz is a 77-year-old male status post fall in October 2022.  He sustained an L1 compression fracture at the time.  Despite conservative therapy, he still continues to complain of pain.  An MRI of the lumbar spine still shows STIR sequence signal changes of the L1 vertebral body.  Given the fact that he is failed conservative therapy I do believe that he is a good candidate for an L1 kyphoplasty.  Explained the procedure in detail to the patient as well as the risks and benefits and pros and cons.  The patient wishes to proceed at this time.  He knows that he needs to be off of his aspirin, Plavix, and Eliquis for 1 week prior to the procedure.  He will need preoperative cardiac clearance.  We will schedule surgery in the near future.  He knows he can call with any further questions or concerns in the meantime.        Note dictated with voice recognition software, please excuse any grammatical errors.

## 2023-01-12 NOTE — PROGRESS NOTES
"Neurosurgery  Established Patient    SUBJECTIVE:     History of Present Illness:  Mr. Muñoz is a 77-year-old male who is seeing me today in follow-up.  His last neurosurgery clinic appointment was on December 8, 2022 with Talia Mars PA-C.  He originally presented to the emergency room in October 2022 after a ground level fall at his nursing home.  He complained of low back pain at that time.  He denied any radiating, weakness, or saddle anesthesia.  He was found to have T12 and L1 compression fractures.  He was given a TLSO brace for pain control.  He was lost to follow-up for some time.  At the time of his last clinic appointment, complained of continued low back pain.  This was worse with sitting and walking.  This was improved with lying down.  An MRI of the lumbar spine was ordered at that time to see if the fracture was still subacute to acute.  He is here today to see me in follow-up.  Currently, he continues to complain of low back pain.  It is nonradiating.  His pain is minimal when he is lying down..  It is fairly significant when he is sitting or attempting to walk.  He denies any weakness.  He denies any bowel or bladder incontinence.    Review of patient's allergies indicates:   Allergen Reactions    Iodine      Other reaction(s): swelling  Other reaction(s): Itching  Other reaction(s): Rash       Current Outpatient Medications   Medication Sig Dispense Refill    acetaminophen (TYLENOL) 650 MG TbSR Take 1 tablet (650 mg total) by mouth every 6 (six) hours as needed (pain). 40 tablet 0    amiodarone (PACERONE) 200 MG Tab Take 1 tablet (200 mg total) by mouth once daily. 90 tablet 3    apixaban (ELIQUIS) 5 mg Tab Take 1 tablet (5 mg total) by mouth 2 (two) times daily. 60 tablet 5    BD ULTRA-FINE SHORT PEN NEEDLE 31 gauge x 5/16" Ndle 3 (three) times daily.      blood sugar diagnostic Strp 1 strip by Misc.(Non-Drug; Combo Route) route 2 (two) times a day. 200 strip 6    cetirizine (ZYRTEC) 10 " MG tablet Take 1 tablet (10 mg total) by mouth every evening.  0    clopidogreL (PLAVIX) 75 mg tablet Take 75 mg by mouth once daily.      diclofenac sodium (VOLTAREN) 1 % Gel Apply 4 g topically 3 (three) times daily. Apply to sacrun closed skin/buttocks      docusate sodium (COLACE) 100 MG capsule Take 100 mg by mouth once daily at 6am.      ergocalciferol (ERGOCALCIFEROL) 50,000 unit Cap Take 1 capsule (50,000 Units total) by mouth every 7 days. 12 capsule 3    gabapentin (NEURONTIN) 100 MG capsule Take 1 capsule (100 mg total) by mouth 2 (two) times daily. 60 capsule 11    glucose 4 GM chewable tablet Take 4 tablets (16 g total) by mouth as needed for Low blood sugar (50 - 70).  12    glucose 4 GM chewable tablet Take 6 tablets (24 g total) by mouth as needed for Low blood sugar (< 50).  12    ibuprofen (ADVIL,MOTRIN) 400 MG tablet Take by mouth.      insulin aspart U-100 (NOVOLOG) 100 unit/mL (3 mL) InPn pen Inject 1-10 Units into the skin before meals and at bedtime as needed (Hyperglycemia). 30 mL 3    insulin aspart U-100 (NOVOLOG) 100 unit/mL (3 mL) InPn pen Inject 15 Units into the skin 3 (three) times daily with meals. 162 mL 0    insulin glargine,hum.rec.anlog (LANTUS SOLOSTAR U-100 INSULIN SUBQ) Inject 12 Units into the skin 2 (two) times a day.      insulin lispro 100 unit/mL injection Inject 4 Units into the skin.      lacosamide (VIMPAT) 100 mg Tab Take 1 tablet (100 mg total) by mouth every 12 (twelve) hours. 60 tablet 11    lancets (ONETOUCH ULTRASOFT LANCETS) Misc 1 lancet by Misc.(Non-Drug; Combo Route) route 2 (two) times a day. 200 each 6    losartan (COZAAR) 25 MG tablet Take 25 mg by mouth once daily.      MAGNESIUM ORAL Take 400 mg by mouth once.      methocarbamoL (ROBAXIN) 500 MG Tab Take 500 mg by mouth daily as needed.      metoprolol succinate (TOPROL-XL) 50 MG 24 hr tablet Take 50 mg by mouth once daily.      MULTIVITAMIN ORAL Take 1 tablet by mouth once daily.       ondansetron  "(ZOFRAN-ODT) 4 MG TbDL Take 4 mg by mouth every 6 (six) hours as needed.      pantoprazole (PROTONIX) 40 MG tablet Take 1 tablet (40 mg total) by mouth once daily. 30 tablet 11    pen needle, diabetic (PEN NEEDLE) 30 gauge x 5/16" Ndle 1 Units by Misc.(Non-Drug; Combo Route) route 3 (three) times daily. 100 each 3    polycarbophil (FIBERCON) 625 mg tablet Take 1 tablet by mouth once daily.       psyllium husk, with sugar, (METAMUCIL, WITH SUGAR,) 3.4 gram packet Take 1 packet by mouth 2 (two) times daily.      sertraline (ZOLOFT) 50 MG tablet Take 50 mg by mouth once daily.      sucralfate (CARAFATE) 1 gram tablet Take 1 g by mouth 3 (three) times daily before meals.      tamsulosin (FLOMAX) 0.4 mg Cap Take 1 capsule (0.4 mg total) by mouth every evening.  0    atorvastatin (LIPITOR) 40 MG tablet Take 1 tablet (40 mg total) by mouth once daily. 90 tablet 3    insulin detemir U-100 (LEVEMIR FLEXTOUCH) 100 unit/mL (3 mL) SubQ InPn pen Inject 6 Units into the skin 2 (two) times daily. 3.6 mL 0    nitroGLYCERIN (NITROSTAT) 0.4 MG SL tablet Place 1 tablet (0.4 mg total) under the tongue every 5 (five) minutes as needed for Chest pain. 25 tablet 11     No current facility-administered medications for this visit.       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    Hyperlipidemia     Hypertension     Kidney stone     Migraine headache     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 08/02/2012    STEMI involving right coronary artery 01/09/2022    Type II or unspecified type diabetes mellitus with neurological manifestations, uncontrolled(250.62) 03/08/2013    Urinary tract infection      Past Surgical History:   Procedure Laterality Date    BACK SURGERY      CATARACT EXTRACTION W/  INTRAOCULAR LENS IMPLANT Right     Per Dr Romero note " "2018    COLONOSCOPY N/A 2019    Procedure: COLONOSCOPY Suprep;  Surgeon: Anh Johnson MD;  Location: [facility] ENDO;  Service: Endoscopy;  Laterality: N/A;    ESOPHAGOGASTRODUODENOSCOPY N/A 9/15/2022    Procedure: EGD (ESOPHAGOGASTRODUODENOSCOPY);  Surgeon: Dashawn Evans MD;  Location: [facility] ENDO;  Service: Endoscopy;  Laterality: N/A;    EYE SURGERY      HERNIA REPAIR      LEFT HEART CATHETERIZATION Left 2022    Procedure: CATHETERIZATION, HEART, LEFT;  Surgeon: Will Hurst III, MD;  Location: [facility] CATH LAB/EP;  Service: Cardiology;  Laterality: Left;    renal stones      SHOULDER OPEN ROTATOR CUFF REPAIR       Family History       Problem Relation (Age of Onset)    Diabetes Father          Social History     Socioeconomic History    Marital status:    Tobacco Use    Smoking status: Former     Packs/day: 1.50     Years: 25.00     Pack years: 37.50     Types: Cigarettes     Quit date: 1983     Years since quittin.0    Smokeless tobacco: Never   Substance and Sexual Activity    Alcohol use: No    Drug use: No    Sexual activity: Yes     Partners: Female   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     Social Determinants of Health     Financial Resource Strain: Low Risk     Difficulty of Paying Living Expenses: Not hard at all   Food Insecurity: No Food Insecurity    Worried About Running Out of Food in the Last Year: Never true    Ran Out of Food in the Last Year: Never true   Transportation Needs: No Transportation Needs    Lack of Transportation (Medical): No    Lack of Transportation (Non-Medical): No   Housing Stability: Low Risk     Unable to Pay for Housing in the Last Year: No    Number of Places Lived in the Last Year: 1    Unstable Housing in the Last Year: No       Review of Systems    OBJECTIVE:     Vital Signs  Pulse: 73  BP: 119/67  Pain Score:   3  Height: 6' 2" (188 cm)  Weight: 74.4 kg (164 lb 0.4 oz)  Body " mass index is 21.06 kg/m².    Physical Exam:  Vitals reviewed.    Constitutional: He appears well-developed and well-nourished. No distress.     Eyes: Pupils are equal, round, and reactive to light. Conjunctivae and EOM are normal.     Cardiovascular: Normal rate, regular rhythm, normal pulses and no edema.     Abdominal: Soft. Bowel sounds are normal.     Skin: Skin displays no rash on trunk and no rash on extremities. Skin displays no lesions on trunk and no lesions on extremities.     Psych/Behavior: He is alert. He is oriented to person, place, and time. He has a normal mood and affect.     Musculoskeletal: Gait is abnormal.        Neck: Range of motion is full. There is no tenderness. Muscle strength is 5/5. Tone is normal.        Back: Range of motion is full. There is no tenderness. Muscle strength is 5/5. Tone is normal.        Right Upper Extremities: Range of motion is full. There is no tenderness. Muscle strength is 5/5. Tone is normal.        Left Upper Extremities: Range of motion is full. There is no tenderness. Muscle strength is 5/5. Tone is normal.       Right Lower Extremities: Range of motion is full. There is no tenderness. Muscle strength is 5/5. Tone is normal.        Left Lower Extremities: Range of motion is full. There is no tenderness. Muscle strength is 5/5. Tone is normal.     Neurological:        Coordination: He has a normal Romberg Test, normal finger to nose coordination and normal tandem walking coordination.        Sensory: There is no sensory deficit in the trunk. There is no sensory deficit in the extremities.        DTRs: DTRs are DTRS NORMAL AND SYMMETRICnormal and symmetric. He displays no Babinski's sign on the right side. He displays no Babinski's sign on the left side.        Cranial nerves: Cranial nerve(s) II, III, IV, V, VI, VII, VIII, IX, X, XI and XII are intact.       Diagnostic Results:  He has an MRI of the lumbar spine available for review which I personally  reviewed.  This shows multilevel lumbar spondylosis.  He has a compression fracture at the L1 level with approximately 25% loss of height.  There is still significant STIR sequence signal changes within the L1 vertebral body.  There are no STIR sequence signal changes within the T12 body.    ASSESSMENT/PLAN:     Mr. Muñoz is a 77-year-old male status post fall in October 2022.  He sustained an L1 compression fracture at the time.  Despite conservative therapy, he still continues to complain of pain.  An MRI of the lumbar spine still shows STIR sequence signal changes of the L1 vertebral body.  Given the fact that he is failed conservative therapy I do believe that he is a good candidate for an L1 kyphoplasty.  Explained the procedure in detail to the patient as well as the risks and benefits and pros and cons.  The patient wishes to proceed at this time.  He knows that he needs to be off of his aspirin, Plavix, and Eliquis for 1 week prior to the procedure.  He will need preoperative cardiac clearance.  We will schedule surgery in the near future.  He knows he can call with any further questions or concerns in the meantime.        Note dictated with voice recognition software, please excuse any grammatical errors.

## 2023-01-16 PROBLEM — Z87.440 HISTORY OF UTI: Status: RESOLVED | Noted: 2022-10-13 | Resolved: 2023-01-16

## 2023-01-25 ENCOUNTER — TELEPHONE (OUTPATIENT)
Dept: NEUROSURGERY | Facility: CLINIC | Age: 78
End: 2023-01-25
Payer: MEDICARE

## 2023-01-25 NOTE — TELEPHONE ENCOUNTER
----- Message from Imelda Pérez sent at 1/25/2023  4:29 PM CST -----  Regarding: pt advice  Contact: Valeri [phone]  [facility]eri from [facility] from Pt visit on 01/12/23 regarding stroke care. Advised caller send can fax request for clinical notes to [phone]. Please call to discuss further.

## 2023-01-30 ENCOUNTER — HOSPITAL ENCOUNTER (OUTPATIENT)
Dept: CARDIOLOGY | Facility: HOSPITAL | Age: 78
Discharge: HOME | End: 2023-01-30
Attending: INTERNAL MEDICINE
Payer: MEDICARE

## 2023-01-30 ENCOUNTER — HOSPITAL ENCOUNTER (OUTPATIENT)
Dept: PREADMISSION TESTING | Facility: OTHER | Age: 78
Discharge: HOME | End: 2023-01-30
Attending: NEUROLOGICAL SURGERY
Payer: MEDICARE

## 2023-01-30 ENCOUNTER — ANESTHESIA EVENT (OUTPATIENT)
Dept: SURGERY | Facility: OTHER | Age: 78
End: 2023-01-30
Payer: MEDICARE

## 2023-01-30 ENCOUNTER — OFFICE VISIT (OUTPATIENT)
Dept: CARDIOLOGY | Facility: CLINIC | Age: 78
End: 2023-01-30
Payer: MEDICARE

## 2023-01-30 ENCOUNTER — TELEPHONE (OUTPATIENT)
Dept: NEUROSURGERY | Facility: CLINIC | Age: 78
End: 2023-01-30
Payer: MEDICARE

## 2023-01-30 VITALS
HEART RATE: 74 BPM | TEMPERATURE: 98 F | OXYGEN SATURATION: 96 % | HEIGHT: 73 IN | DIASTOLIC BLOOD PRESSURE: 61 MMHG | BODY MASS INDEX: 21.74 KG/M2 | WEIGHT: 164 LBS | SYSTOLIC BLOOD PRESSURE: 122 MMHG | RESPIRATION RATE: 16 BRPM

## 2023-01-30 VITALS
HEIGHT: 73 IN | WEIGHT: 164 LBS | SYSTOLIC BLOOD PRESSURE: 135 MMHG | OXYGEN SATURATION: 95 % | BODY MASS INDEX: 21.74 KG/M2 | HEART RATE: 71 BPM | DIASTOLIC BLOOD PRESSURE: 63 MMHG

## 2023-01-30 VITALS
WEIGHT: 164 LBS | SYSTOLIC BLOOD PRESSURE: 135 MMHG | HEART RATE: 66 BPM | HEIGHT: 73 IN | DIASTOLIC BLOOD PRESSURE: 63 MMHG | BODY MASS INDEX: 21.74 KG/M2

## 2023-01-30 DIAGNOSIS — Z79.899 LONG TERM CURRENT USE OF ANTIARRHYTHMIC DRUG: ICD-10-CM

## 2023-01-30 DIAGNOSIS — Z79.01 CHRONIC ANTICOAGULATION: ICD-10-CM

## 2023-01-30 DIAGNOSIS — I63.9 CEREBROVASCULAR ACCIDENT (CVA), UNSPECIFIED MECHANISM: ICD-10-CM

## 2023-01-30 DIAGNOSIS — I48.91 ATRIAL FIBRILLATION, UNSPECIFIED TYPE: Primary | ICD-10-CM

## 2023-01-30 DIAGNOSIS — Z51.81 ENCOUNTER FOR MEDICATION MONITORING: ICD-10-CM

## 2023-01-30 DIAGNOSIS — I25.2 OLD MYOCARDIAL INFARCTION: ICD-10-CM

## 2023-01-30 DIAGNOSIS — I48.91 ATRIAL FIBRILLATION, UNSPECIFIED TYPE: ICD-10-CM

## 2023-01-30 DIAGNOSIS — I25.110 CORONARY ARTERY DISEASE INVOLVING NATIVE CORONARY ARTERY OF NATIVE HEART WITH UNSTABLE ANGINA PECTORIS: ICD-10-CM

## 2023-01-30 LAB
ASCENDING AORTA: 3.08 CM
AV INDEX (PROSTH): 0.73
AV MEAN GRADIENT: 3 MMHG
AV PEAK GRADIENT: 5 MMHG
AV VALVE AREA: 3.14 CM2
AV VELOCITY RATIO: 0.74
BSA FOR ECHO PROCEDURE: 1.96 M2
CV ECHO LV RWT: 0.32 CM
DOP CALC AO PEAK VEL: 1.11 M/S
DOP CALC AO VTI: 28.75 CM
DOP CALC LVOT AREA: 4.3 CM2
DOP CALC LVOT DIAMETER: 2.34 CM
DOP CALC LVOT PEAK VEL: 0.82 M/S
DOP CALC LVOT STROKE VOLUME: 90.39 CM3
DOP CALCLVOT PEAK VEL VTI: 21.03 CM
E WAVE DECELERATION TIME: 173.99 MSEC
E/A RATIO: 0.74
E/E' RATIO: 7.41 M/S
ECHO LV POSTERIOR WALL: 0.81 CM (ref 0.6–1.1)
EJECTION FRACTION: 45 %
FRACTIONAL SHORTENING: 21 % (ref 28–44)
INTERVENTRICULAR SEPTUM: 0.9 CM (ref 0.6–1.1)
LA MAJOR: 4.51 CM
LA MINOR: 4.98 CM
LA WIDTH: 4.25 CM
LEFT ATRIUM SIZE: 3.97 CM
LEFT ATRIUM VOLUME INDEX MOD: 29.8 ML/M2
LEFT ATRIUM VOLUME INDEX: 34.3 ML/M2
LEFT ATRIUM VOLUME MOD: 59.09 CM3
LEFT ATRIUM VOLUME: 67.88 CM3
LEFT INTERNAL DIMENSION IN SYSTOLE: 4.07 CM (ref 2.1–4)
LEFT VENTRICLE DIASTOLIC VOLUME INDEX: 63.42 ML/M2
LEFT VENTRICLE DIASTOLIC VOLUME: 125.58 ML
LEFT VENTRICLE MASS INDEX: 78 G/M2
LEFT VENTRICLE SYSTOLIC VOLUME INDEX: 36.9 ML/M2
LEFT VENTRICLE SYSTOLIC VOLUME: 72.97 ML
LEFT VENTRICULAR INTERNAL DIMENSION IN DIASTOLE: 5.13 CM (ref 3.5–6)
LEFT VENTRICULAR MASS: 154.53 G
LV LATERAL E/E' RATIO: 6.3 M/S
LV SEPTAL E/E' RATIO: 9 M/S
LVOT MG: 1.21 MMHG
LVOT MV: 0.53 CM/S
MV A" WAVE DURATION": 11.13 MSEC
MV PEAK A VEL: 0.85 M/S
MV PEAK E VEL: 0.63 M/S
MV STENOSIS PRESSURE HALF TIME: 50.46 MS
MV VALVE AREA P 1/2 METHOD: 4.36 CM2
PISA TR MAX VEL: 1.88 M/S
PULM VEIN S/D RATIO: 1.65
PV PEAK D VEL: 0.34 M/S
PV PEAK S VEL: 0.56 M/S
RA MAJOR: 4.49 CM
RA PRESSURE: 3 MMHG
RA VOL SYS: 57.89 ML
RA WIDTH: 3.88 CM
RIGHT ATRIAL AREA: 18.1 CM2
RIGHT VENTRICULAR END-DIASTOLIC DIMENSION: 3.33 CM
RV TISSUE DOPPLER FREE WALL SYSTOLIC VELOCITY 1 (APICAL 4 CHAMBER VIEW): 11.85 CM/S
SINUS: 3.19 CM
STJ: 2.73 CM
TDI LATERAL: 0.1 M/S
TDI SEPTAL: 0.07 M/S
TDI: 0.09 M/S
TR MAX PG: 14 MMHG
TRICUSPID ANNULAR PLANE SYSTOLIC EXCURSION: 2.33 CM
TV REST PULMONARY ARTERY PRESSURE: 17 MMHG

## 2023-01-30 PROCEDURE — 99204 OFFICE O/P NEW MOD 45 MIN: CPT | Mod: S$PBB,,, | Performed by: INTERNAL MEDICINE

## 2023-01-30 PROCEDURE — 99204 PR OFFICE/OUTPT VISIT, NEW, LEVL IV, 45-59 MIN: ICD-10-PCS | Mod: S$PBB,,, | Performed by: INTERNAL MEDICINE

## 2023-01-30 PROCEDURE — 93306 TTE W/DOPPLER COMPLETE: CPT | Mod: 26,,, | Performed by: INTERNAL MEDICINE

## 2023-01-30 PROCEDURE — 93306 TTE W/DOPPLER COMPLETE: CPT

## 2023-01-30 PROCEDURE — 99215 OFFICE O/P EST HI 40 MIN: CPT | Mod: PBBFAC,25 | Performed by: INTERNAL MEDICINE

## 2023-01-30 PROCEDURE — 93306 ECHO (CUPID ONLY): ICD-10-PCS | Mod: 26,,, | Performed by: INTERNAL MEDICINE

## 2023-01-30 PROCEDURE — 99999 PR PBB SHADOW E&M-EST. PATIENT-LVL V: ICD-10-PCS | Mod: PBBFAC,,, | Performed by: INTERNAL MEDICINE

## 2023-01-30 PROCEDURE — 99999 PR PBB SHADOW E&M-EST. PATIENT-LVL V: CPT | Mod: PBBFAC,,, | Performed by: INTERNAL MEDICINE

## 2023-01-30 RX ORDER — ALBUTEROL SULFATE 2.5 MG/.5ML
2.5 SOLUTION RESPIRATORY (INHALATION)
Status: CANCELLED | OUTPATIENT
Start: 2023-01-30 | End: 2023-01-30

## 2023-01-30 RX ORDER — CIPROFLOXACIN 500 MG/1
TABLET ORAL
Status: ON HOLD | COMMUNITY
Start: 2023-01-27 | End: 2023-03-01 | Stop reason: HOSPADM

## 2023-01-30 RX ORDER — SITAGLIPTIN 50 MG/1
100 TABLET, FILM COATED ORAL
Status: ON HOLD | COMMUNITY
Start: 2023-01-20 | End: 2023-03-01 | Stop reason: HOSPADM

## 2023-01-30 RX ORDER — LIDOCAINE HYDROCHLORIDE 10 MG/ML
1 INJECTION, SOLUTION EPIDURAL; INFILTRATION; INTRACAUDAL; PERINEURAL ONCE
Status: CANCELLED | OUTPATIENT
Start: 2023-01-30 | End: 2023-01-30

## 2023-01-30 RX ORDER — NAPROXEN SODIUM 220 MG/1
81 TABLET, FILM COATED ORAL
Status: ON HOLD | COMMUNITY
Start: 2022-02-18 | End: 2023-02-01 | Stop reason: HOSPADM

## 2023-01-30 RX ORDER — CHOLECALCIFEROL (VITAMIN D3) 25 MCG
1000 TABLET ORAL DAILY
COMMUNITY

## 2023-01-30 NOTE — PROGRESS NOTES
"  Subjective:     Problem List:  Atrial fib  CAD  - MI   - RCA stent 2022  DM  Old stroke    HPI:   Kamar Muñoz is a 77 y.o. male referred by Basim Guerrero MD for evaluation of pre op clearance.  He resides at [facility].   The aide accompanying him states that he is not mobile and spends most of the day in a wheel chair. On amiodarone and apixaban since for atrial fibrillation that happened while hospitalized w a MI in 2022. A stent was placed in an occluded RCA and a 70% LAD stenosis treated medically. He does not report angina or shortness of breath with exertion. He has not followed up in the Cardiology clinic since then. On dual antiplatelet therapy and apixaban. Met w Dr. Shahriar Powell in 2018. Patient recalls that Dr. Powell was his wife's cardiologist for many years before she .   He now has a compression fracture of L1 and is scheduled to undergo kyphoplasty later this week.      Review of patient's allergies indicates:   Allergen Reactions    Iodine      Other reaction(s): swelling  Other reaction(s): Itching  Other reaction(s): Rash / IVP        Current Outpatient Medications   Medication Sig    acetaminophen (TYLENOL) 650 MG TbSR Take 1 tablet (650 mg total) by mouth every 6 (six) hours as needed (pain).    amiodarone (PACERONE) 200 MG Tab Take 1 tablet (200 mg total) by mouth once daily.    apixaban (ELIQUIS) 5 mg Tab Take 1 tablet (5 mg total) by mouth 2 (two) times daily.    aspirin 81 MG Chew Take 81 mg by mouth.    atorvastatin (LIPITOR) 40 MG tablet Take 1 tablet (40 mg total) by mouth once daily.    BD ULTRA-FINE SHORT PEN NEEDLE 31 gauge x 5/16" Ndle 3 (three) times daily.    blood sugar diagnostic Strp 1 strip by Misc.(Non-Drug; Combo Route) route 2 (two) times a day.    cetirizine (ZYRTEC) 10 MG tablet Take 1 tablet (10 mg total) by mouth every evening.    ciprofloxacin HCl (CIPRO) 500 MG tablet     clopidogreL (PLAVIX) 75 mg tablet " "Take 75 mg by mouth once daily.    diclofenac sodium (VOLTAREN) 1 % Gel Apply 4 g topically 3 (three) times daily. Apply to sacrun closed skin/buttocks    docusate sodium (COLACE) 100 MG capsule Take 100 mg by mouth once daily at 6am.    gabapentin (NEURONTIN) 100 MG capsule Take 1 capsule (100 mg total) by mouth 2 (two) times daily.    glucose 4 GM chewable tablet Take 4 tablets (16 g total) by mouth as needed for Low blood sugar (50 - 70).    glucose 4 GM chewable tablet Take 6 tablets (24 g total) by mouth as needed for Low blood sugar (< 50).    ibuprofen (ADVIL,MOTRIN) 400 MG tablet Take by mouth.    insulin aspart U-100 (NOVOLOG) 100 unit/mL (3 mL) InPn pen Inject 1-10 Units into the skin before meals and at bedtime as needed (Hyperglycemia).    insulin aspart U-100 (NOVOLOG) 100 unit/mL (3 mL) InPn pen Inject 15 Units into the skin 3 (three) times daily with meals. (Patient taking differently: Inject 5 Units into the skin 3 (three) times daily with meals.)    insulin glargine,hum.rec.anlog (LANTUS SOLOSTAR U-100 INSULIN SUBQ) Inject 12 Units into the skin 2 (two) times a day.    insulin lispro 100 unit/mL injection Inject 4 Units into the skin.    JANUVIA 50 mg Tab Take 100 mg by mouth.    lacosamide (VIMPAT) 100 mg Tab Take 1 tablet (100 mg total) by mouth every 12 (twelve) hours.    lancets (ONETOUCH ULTRASOFT LANCETS) Misc 1 lancet by Misc.(Non-Drug; Combo Route) route 2 (two) times a day.    losartan (COZAAR) 25 MG tablet Take 25 mg by mouth once daily.    MAGNESIUM ORAL Take 400 mg by mouth once.    methocarbamoL (ROBAXIN) 500 MG Tab Take 500 mg by mouth daily as needed.    metoprolol succinate (TOPROL-XL) 50 MG 24 hr tablet Take 25 mg by mouth once daily.    MULTIVITAMIN ORAL Take 1 tablet by mouth once daily.     ondansetron (ZOFRAN-ODT) 4 MG TbDL Take 4 mg by mouth every 6 (six) hours as needed.    pen needle, diabetic (PEN NEEDLE) 30 gauge x 5/16" Ndle 1 Units by Misc.(Non-Drug; Combo Route) route 3 " (three) times daily.    polycarbophil (FIBERCON) 625 mg tablet Take 1 tablet by mouth once daily.     psyllium husk, with sugar, (METAMUCIL, WITH SUGAR,) 3.4 gram packet Take 1 packet by mouth 2 (two) times daily.    sertraline (ZOLOFT) 50 MG tablet Take 50 mg by mouth once daily.    sucralfate (CARAFATE) 1 gram tablet Take 1 g by mouth 3 (three) times daily before meals.    tamsulosin (FLOMAX) 0.4 mg Cap Take 1 capsule (0.4 mg total) by mouth every evening.    vitamin D (VITAMIN D3) 1000 units Tab Take 1,000 Units by mouth once daily.    insulin detemir U-100 (LEVEMIR FLEXTOUCH) 100 unit/mL (3 mL) SubQ InPn pen Inject 6 Units into the skin 2 (two) times daily.    nitroGLYCERIN (NITROSTAT) 0.4 MG SL tablet Place 1 tablet (0.4 mg total) under the tongue every 5 (five) minutes as needed for Chest pain.    pantoprazole (PROTONIX) 40 MG tablet Take 1 tablet (40 mg total) by mouth once daily.     No current facility-administered medications for this visit.         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   Past Surgical History:   Procedure Laterality Date    BACK SURGERY      CATARACT EXTRACTION W/  INTRAOCULAR LENS IMPLANT Right     Per Dr Romero note 11/2018    COLONOSCOPY N/A 1/28/2019    Procedure: COLONOSCOPY Suprep;  Surgeon: Anh Johnson MD;  Location: [facility];  Service:  "Endoscopy;  Laterality: N/A;    ESOPHAGOGASTRODUODENOSCOPY N/A 9/15/2022    Procedure: EGD (ESOPHAGOGASTRODUODENOSCOPY);  Surgeon: Dashawn Evans MD;  Location: [facility] ENDO;  Service: Endoscopy;  Laterality: N/A;    EYE SURGERY      HERNIA REPAIR      LEFT HEART CATHETERIZATION Left 1/9/2022    Procedure: CATHETERIZATION, HEART, LEFT;  Surgeon: Will Hurst III, MD;  Location: [facility] CATH LAB/EP;  Service: Cardiology;  Laterality: Left;    renal stones      SHOULDER OPEN ROTATOR CUFF REPAIR           Social history:  Kamar Muñoz  reports that he quit smoking about 40 years ago. His smoking use included cigarettes. He has a 37.50 pack-year smoking history. He has never used smokeless tobacco. He reports that he does not drink alcohol and does not use drugs.    Family history:  Family History   Problem Relation Age of Onset    Diabetes Father     Prostate cancer Neg Hx     Kidney disease Neg Hx             Objective:       Physical Exam  Constitutional:       Appearance: He is well-developed.      Comments: /63   Pulse 71   Ht 6' 1" (1.854 m)   Wt 74.4 kg (164 lb 0.4 oz)   SpO2 95%   BMI 21.64 kg/m²      HENT:      Head: Normocephalic.   Neck:      Vascular: No JVD.   Cardiovascular:      Rate and Rhythm: Normal rate and regular rhythm.      Heart sounds: S1 normal and S2 normal. No murmur heard.  Pulmonary:      Breath sounds: Normal breath sounds.   Chest:      Chest wall: There is no dullness to percussion.   Abdominal:      Palpations: Abdomen is soft. There is no hepatomegaly, splenomegaly or mass.   Musculoskeletal:      Right lower leg: No edema.      Left lower leg: No edema.   Skin:     Findings: No bruising.      Nails: There is no clubbing.   Neurological:      Mental Status: He is alert and oriented to person, place, and time.      Comments: Seated in a wheelchair.   Psychiatric:         Speech: Speech normal.         Behavior: Behavior normal.         Lab Results   Component " Value Date    CHOL 182 01/25/2022    CHOL 158 12/29/2021    CHOL 127 05/24/2021     Lab Results   Component Value Date    HDL 50 01/25/2022    HDL 61 12/29/2021    HDL 62 05/24/2021     Lab Results   Component Value Date    LDLCALC 89.0 01/25/2022    LDLCALC 81.6 12/29/2021    LDLCALC 57.0 (L) 05/24/2021     Lab Results   Component Value Date    TRIG 215 (H) 01/25/2022    TRIG 77 12/29/2021    TRIG 40 05/24/2021     Lab Results   Component Value Date    CHOLHDL 27.5 01/25/2022    CHOLHDL 38.6 12/29/2021    CHOLHDL 48.8 05/24/2021     Lab Results   Component Value Date    ALT 36 10/24/2022    AST 43 (H) 10/24/2022    ALKPHOS 170 (H) 10/24/2022    BILITOT 0.3 10/24/2022      Lab Results   Component Value Date    CREATININE 1.3 10/24/2022    BUN 30 (H) 10/24/2022     (L) 10/24/2022    K 4.3 10/24/2022    CL 96 10/24/2022    CO2 24 10/24/2022       Results for orders placed during the hospital encounter of 01/25/22    Echo    Interpretation Summary  · There is abnormal septal wall motion.  · The left ventricle is normal in size with low normal systolic function.  · The estimated ejection fraction is 50%.  · Normal left ventricular diastolic function.  · Mild left atrial enlargement.  · Normal right ventricular size with normal right ventricular systolic function.  · Mild mitral regurgitation.  · There are segmental left ventricular wall motion abnormalities.  · Mild tricuspid regurgitation.  · Elevated central venous pressure (15 mmHg).     Cath 1/9/2022:  There was two vessel coronary artery disease with 100% occlusion of mid RCA and 70% stenosis of proximal LAD.  The left ventricular end diastolic pressure was elevated, LVEDP 15 mmHg.  Successful PCI of mid RCA with 3.0x30 mm and 3.0x8 mm LAUREN with execellent angiographic results.  The Prox LAD lesion was 70% stenosed.  The Dist LAD lesion was 60% stenosed.  The Mid RCA lesion was 100% stenosed with 0% stenosis post-intervention.      Assessment and Plan:        ICD-10-CM ICD-9-CM   1. Atrial fibrillation, unspecified type  I48.91 427.31   2. Chronic anticoagulation  Z79.01 V58.61   3. Coronary artery disease involving native coronary artery of native heart with unstable angina pectoris  I25.110 414.01     411.1   4. Old myocardial infarction 1/2022  I25.2 412   5. Old cerebrovascular accident (CVA)   I63.9 434.91   6. Encounter for medication monitoring  Z51.81 V58.83   7. Long term current use of antiarrhythmic drug  Z79.899 V58.69         Hold apixaban and clopidogrel until after surgery. Resume apixaban after surgery but it has been 12 months since the MI and PCI and it should be safe to discontinue clopidogrel. Continue aspirin. Obtain echo and if no changes in LVEF, will clear for surgery.  Patient needs fup in Cardiology clinic.     Orders entered during this encounter:    Atrial fibrillation, unspecified type  -     Echo; Future; Expected date: 01/30/2023  -     TSH; Future; Expected date: 04/30/2023  -     EKG 12-lead; Future; Expected date: 04/30/2023    Chronic anticoagulation  -     CBC Without Differential; Future; Expected date: 04/30/2023    Coronary artery disease involving native coronary artery of native heart with unstable angina pectoris  -     Lipid Panel; Future; Expected date: 04/30/2023  -     Comprehensive Metabolic Panel; Future; Expected date: 04/30/2023    Old myocardial infarction 1/2022    Old cerebrovascular accident (CVA)     Encounter for medication monitoring    Long term current use of antiarrhythmic drug  -     TSH; Future; Expected date: 04/30/2023         Follow up in about 3 months (around 4/30/2023).      Addendum: Cleared for surgery and anesthesia. Moderate risk of perioperative MACE due to age and pre-existing medical conditions. Confirmed that patient has been off aspirin, clopidogrel and apixaban since 1/25/2023. From the cardiac stand point would have preferred holding aspirin for no more than 3 days prior to surgery. Please  resume as soon as possible after surgery. Clopidogrel does not have to be resumed. Please resume apixaban when safe from the surgical stand point.

## 2023-01-30 NOTE — ANESTHESIA PREPROCEDURE EVALUATION
01/30/2023  Kamar Muñoz is a 77 y.o., male.      Pre-op Assessment    I have reviewed the Patient Summary Reports.     I have reviewed the Nursing Notes. I have reviewed the NPO Status.   I have reviewed the Medications.     Review of Systems  Anesthesia Hx:  No problems with previous Anesthesia  History of prior surgery of interest to airway management or planning: Denies Family Hx of Anesthesia complications.   Denies Personal Hx of Anesthesia complications.   Social:  Former Smoker    Hematology/Oncology:     Oncology Normal   Hematology Comments: On anticoagulants Eliquis and Plavix both stopped on Jan 25!!   EENT/Dental:EENT/Dental Normal   Cardiovascular:   Exercise tolerance: poor Hypertension Past MI CAD  CABG/stent Dysrhythmias atrial fibrillation Angina PETTY ECG has been reviewed. MI Jan 2022,cath,2 stents placed went into a fib,then had CVA   Pulmonary:   COPD, mild    Renal/:   Chronic Renal Disease renal calculi    Musculoskeletal:   Arthritis   Spine Disorders: lumbar and cervical Disc disease, Degenerative disease and Chronic Pain    Neurological:   CVA, residual symptoms Headaches Embolic CVA  Left sided weakness   Endocrine:   Diabetes, type 2    Psych:   Psychiatric History anxiety depression          Physical Exam  General: Cooperative, Alert and Cachexia    Airway:  Mallampati: II   Mouth Opening: Normal  Neck ROM: Normal ROM    Dental:  Periodontal disease, Partial Dentures  Poor dentition      Anesthesia Plan  Type of Anesthesia, risks & benefits discussed:    Anesthesia Type: Gen ETT  Intra-op Monitoring Plan: Standard ASA Monitors and Art Line  Post Op Pain Control Plan: multimodal analgesia  Induction:  IV  Airway Plan: Video, Post-Induction  Informed Consent: Informed consent signed with the Patient and all parties understand the risks and agree with anesthesia plan.  All  questions answered.   ASA Score: 4  Anesthesia Plan Notes: Complicated medical history from nursing home.Cardiac stents Jan 2022,S/P CVA Rec Art line  Hx of a fib was on anti coag stopped for surg  Will require cardiac clearance    Addendum: Cleared for surgery and anesthesia. Moderate risk of perioperative MACE due to age and pre-existing medical conditions. Confirmed that patient has been off aspirin, clopidogrel and apixaban since 1/25/2023. From the cardiac stand point would have preferred holding aspirin for no more than 3 days prior to surgery. Please resume as soon as possible after surgery. Clopidogrel does not have to be resumed. Please resume apixaban when safe from the surgical stand point.    1/30/23:Summary    ? The left ventricle is normal in size with mildly decreased systolic function.  ? The estimated ejection fraction is 45%.  ? There are segmental left ventricular wall motion abnormalities.  ? There is abnormal septal wall motion.  ? Grade I left ventricular diastolic dysfunction.  ? Normal right ventricular size with normal right ventricular systolic function.  ? Mild mitral regurgitation.  ? Normal central venous pressure (3 mmHg).  ? The estimated PA systolic pressure is 17 mmHg.      Ready For Surgery From Anesthesia Perspective.     .

## 2023-01-30 NOTE — PRE ADMISSION SCREENING
bEDSORE reported per pt and daughter. States has been there 2 weeks after falling. Pt is bedbound.

## 2023-01-30 NOTE — DISCHARGE INSTRUCTIONS
Information to Prepare you for your Surgery    PRE-ADMIT TESTING -  [phone]    [address] [facility]          Your surgery has been scheduled at [facility]. We are pleased to have the opportunity to serve you. For Further Information please call [phone].    On the day of surgery please report to the Information Desk on the 1st floor.    CONTACT YOUR PHYSICIAN'S OFFICE THE DAY PRIOR TO YOUR SURGERY TO OBTAIN YOUR ARRIVAL TIME.     The evening before surgery do not eat anything after 9 p.m. ( this includes hard candy, chewing gum and mints).  You may only have GATORADE, POWERADE AND WATER  from 9 p.m. until you leave your home.   DO NOT DRINK ANY LIQUIDS ON THE WAY TO THE HOSPITAL.      Why does your anesthesiologist allow you to drink Gatorade/Powerade before surgery?  Gatorade/Powerade helps to increase your comfort before surgery and to decrease your nausea after surgery. The carbohydrates in Gatorade/Powerade help reduce your body's stress response to surgery.  If you are a diabetic-drink only water prior to surgery.      Current Visitor policy(12/27/2021) - Patients may have 2 visitors pre and post procedure. Only 2 visitors will be allowed in the Surgical building with the patient. No one under the age of 12 will be allowed into the facility.    SPECIAL MEDICATION INSTRUCTIONS: TAKE medications checked off by the Anesthesiologist on your Medication List.    Angiogram Patients: Take medications as instructed by your physician, including aspirin.     Surgery Patients:    If you take ASPIRIN - Your PHYSICIAN/SURGEON will need to inform you IF/OR when you need to stop taking aspirin prior to your surgery.     The week prior to surgery do not ot take any medications containing IBUPROFEN or NSAIDS ( Advil, Motrin, Goodys, BC, Aleve, Naproxen etc) If you are not sure if you should take a medicine please call your surgeon's office.  Ok to take  Tylenol    Do Not Wear any make-up (especially eye make-up) to surgery. Please remove any false eyelashes or eyelash extensions. If you arrive the day of surgery with makeup/eyelashes on you will be required to remove prior to surgery. (There is a risk of corneal abrasions if eye makeup/eyelash extensions are not removed)      Leave all valuables at home.   Do Not wear any jewelry or watches, including any metal in body piercings. Jewelry must be removed prior to coming to the hospital.  There is a possibility that rings that are unable to be removed may be cut off if they are on the surgical extremity.    Please remove all hair extensions, wigs, clips and any other metal accessories/ ornaments from your hair.  These items may pose a flammable/fire risk in Surgery and must be removed.    Do not shave your surgical area at least 5 days prior to your surgery. The surgical prep will be performed at the hospital according to Infection Control regulations.    Contact Lens must be removed before surgery. Either do not wear the contact lens or bring a case and solution for storage.  Please bring a container for eyeglasses or dentures as required.  Bring any paperwork your physician has provided, such as consent forms,  history and physicals, doctor's orders, etc.   Bring comfortable clothes that are loose fitting to wear upon discharge. Take into consideration the type of surgery being performed.  Maintain your diet as advised per your physician the day prior to surgery.      Adequate rest the night before surgery is advised.   Park in the Parking lot behind the hospital or in the [facility] across the street from the parking lot. Parking is complimentary.  If you will be discharged the same day as your procedure, please arrange for a responsible adult to drive you home or to accompany you if traveling by taxi.   YOU WILL NOT BE PERMITTED TO DRIVE OR TO LEAVE THE HOSPITAL ALONE AFTER SURGERY.   If you are  being discharged the same day, it is strongly recommended that you arrange for someone to remain with you for the first 24 hrs following your surgery.    The Surgeon will speak to your family/visitor after your surgery regarding the outcome of your surgery and post op care.  The Surgeon may speak to you after your surgery, but there is a possibility you may not remember the details.  Please check with your family members regarding the conversation with the Surgeon.    We strongly recommend whoever is bringing you home be present for discharge instructions.  This will ensure a thorough understanding for your post op home care.    ALL CHILDREN MUST ALWAYS BE ACCOMPANIED BY AN ADULT.    Visitors-Refer to current Visitor policy handouts.    Thank you for your cooperation.  The Staff of [facility].            Bathing Instructions with Hibiclens    Shower the evening before and morning of your procedure with Chlorhexidine (Hibiclens)  do not use Chlorhexidine on your face or genitals. Do not get in your eyes.  Wash your face with water and your regular face wash/soap  Use your regular shampoo  Apply Chlorhexidine (Hibiclens) directly on your skin or on a wet washcloth and wash gently. When showering: Move away from the shower stream when applying Chlorhexidine (Hibiclens) to avoid rinsing off too soon.  Rinse thoroughly with warm water  Do not dilute Chlorhexidine (Hibiclens)   Dry off as usual, do not use any deodorant, powder, body lotions, perfume, after shave or cologne.

## 2023-01-30 NOTE — TELEPHONE ENCOUNTER
Spoke with Basia to see if patient will be able to see a cardiologist for 11:30am today at [facility], she stated she'll speak with patients nurse to see if transportation will bring him, but she said to schedule patient.      Appt scheduled.

## 2023-01-30 NOTE — TELEPHONE ENCOUNTER
----- Message from Wendy Donnelly RN sent at 1/30/2023  9:31 AM CST -----  Regarding: cardiac clearance  Good morning-Re : cardiac clearance -After reading your progress note, Has Cardiac Clearance been obtained? Pt does not know who his cardiologist is and there is not a clearance in Epic. Also- Dr Swann agrees with you-Cardiac Clearance is needed. -Thank you-Wendy PreAdmit [phone]

## 2023-01-31 ENCOUNTER — TELEPHONE (OUTPATIENT)
Dept: NEUROSURGERY | Facility: CLINIC | Age: 78
End: 2023-01-31
Payer: MEDICARE

## 2023-01-31 NOTE — TELEPHONE ENCOUNTER
Spoke with Dalia at patient's residential facility. She confirms pt has been off all anti-coagulants since 1/25. Pt should arrive at 6am for an 8am planned procedure. Dalia verbalized understanding.      No significant past surgical history

## 2023-02-01 ENCOUNTER — ANESTHESIA (OUTPATIENT)
Dept: SURGERY | Facility: OTHER | Age: 78
End: 2023-02-01
Payer: MEDICARE

## 2023-02-01 ENCOUNTER — HOSPITAL ENCOUNTER (OUTPATIENT)
Facility: OTHER | Age: 78
Discharge: HOME | End: 2023-02-01
Attending: NEUROLOGICAL SURGERY | Admitting: NEUROLOGICAL SURGERY
Payer: MEDICARE

## 2023-02-01 DIAGNOSIS — S32.009A LUMBAR VERTEBRAL FRACTURE: ICD-10-CM

## 2023-02-01 DIAGNOSIS — S32.010G CLOSED COMPRESSION FRACTURE OF L1 LUMBAR VERTEBRA, WITH DELAYED HEALING, SUBSEQUENT ENCOUNTER: Primary | ICD-10-CM

## 2023-02-01 LAB
POCT GLUCOSE: 236 MG/DL (ref 70–110)
POCT GLUCOSE: 287 MG/DL (ref 70–110)
POCT GLUCOSE: 341 MG/DL (ref 70–110)
POCT GLUCOSE: 365 MG/DL (ref 70–110)

## 2023-02-01 PROCEDURE — 25000003 PHARM REV CODE 250: Performed by: STUDENT IN AN ORGANIZED HEALTH CARE EDUCATION/TRAINING PROGRAM

## 2023-02-01 PROCEDURE — 25000242 PHARM REV CODE 250 ALT 637 W/ HCPCS

## 2023-02-01 PROCEDURE — 63600175 PHARM REV CODE 636 W HCPCS

## 2023-02-01 PROCEDURE — 63600175 PHARM REV CODE 636 W HCPCS: Performed by: STUDENT IN AN ORGANIZED HEALTH CARE EDUCATION/TRAINING PROGRAM

## 2023-02-01 PROCEDURE — 94640 AIRWAY INHALATION TREATMENT: CPT

## 2023-02-01 PROCEDURE — 36000707: Performed by: NEUROLOGICAL SURGERY

## 2023-02-01 PROCEDURE — 63600175 PHARM REV CODE 636 W HCPCS: Performed by: ANESTHESIOLOGY

## 2023-02-01 PROCEDURE — 36000706: Performed by: NEUROLOGICAL SURGERY

## 2023-02-01 PROCEDURE — P9045 ALBUMIN (HUMAN), 5%, 250 ML: HCPCS | Mod: JG | Performed by: STUDENT IN AN ORGANIZED HEALTH CARE EDUCATION/TRAINING PROGRAM

## 2023-02-01 PROCEDURE — 71000016 HC POSTOP RECOV ADDL HR: Performed by: NEUROLOGICAL SURGERY

## 2023-02-01 PROCEDURE — 22514 PERQ VERTEBRAL AUGMENTATION: CPT | Mod: ,,, | Performed by: NEUROLOGICAL SURGERY

## 2023-02-01 PROCEDURE — 63600175 PHARM REV CODE 636 W HCPCS: Performed by: NEUROLOGICAL SURGERY

## 2023-02-01 PROCEDURE — 71000039 HC RECOVERY, EACH ADD'L HOUR: Performed by: NEUROLOGICAL SURGERY

## 2023-02-01 PROCEDURE — C1713 ANCHOR/SCREW BN/BN,TIS/BN: HCPCS | Performed by: NEUROLOGICAL SURGERY

## 2023-02-01 PROCEDURE — 22514 PR PERQ VERTEBRAL AUGMENTATION UNI/BILAT LUMBAR: ICD-10-PCS | Mod: ,,, | Performed by: NEUROLOGICAL SURGERY

## 2023-02-01 PROCEDURE — 25500020 PHARM REV CODE 255: Performed by: NEUROLOGICAL SURGERY

## 2023-02-01 PROCEDURE — 25000003 PHARM REV CODE 250: Performed by: NEUROLOGICAL SURGERY

## 2023-02-01 PROCEDURE — 27201423 OPTIME MED/SURG SUP & DEVICES STERILE SUPPLY: Performed by: NEUROLOGICAL SURGERY

## 2023-02-01 PROCEDURE — 37000009 HC ANESTHESIA EA ADD 15 MINS: Performed by: NEUROLOGICAL SURGERY

## 2023-02-01 PROCEDURE — 71000015 HC POSTOP RECOV 1ST HR: Performed by: NEUROLOGICAL SURGERY

## 2023-02-01 PROCEDURE — 37000008 HC ANESTHESIA 1ST 15 MINUTES: Performed by: NEUROLOGICAL SURGERY

## 2023-02-01 PROCEDURE — 71000033 HC RECOVERY, INTIAL HOUR: Performed by: NEUROLOGICAL SURGERY

## 2023-02-01 PROCEDURE — 36620 INSERTION CATHETER ARTERY: CPT | Performed by: ANESTHESIOLOGY

## 2023-02-01 PROCEDURE — C1726 CATH, BAL DIL, NON-VASCULAR: HCPCS | Performed by: NEUROLOGICAL SURGERY

## 2023-02-01 RX ORDER — LIDOCAINE HYDROCHLORIDE 20 MG/ML
INJECTION INTRAVENOUS
Status: DISCONTINUED | OUTPATIENT
Start: 2023-02-01 | End: 2023-02-01

## 2023-02-01 RX ORDER — KETAMINE HCL IN 0.9 % NACL 50 MG/5 ML
SYRINGE (ML) INTRAVENOUS
Status: DISCONTINUED | OUTPATIENT
Start: 2023-02-01 | End: 2023-02-01

## 2023-02-01 RX ORDER — OXYCODONE HYDROCHLORIDE 5 MG/1
5 TABLET ORAL
Status: DISCONTINUED | OUTPATIENT
Start: 2023-02-01 | End: 2023-02-01 | Stop reason: HOSPADM

## 2023-02-01 RX ORDER — LIDOCAINE HYDROCHLORIDE 10 MG/ML
1 INJECTION, SOLUTION EPIDURAL; INFILTRATION; INTRACAUDAL; PERINEURAL ONCE
Status: DISCONTINUED | OUTPATIENT
Start: 2023-02-01 | End: 2023-02-01 | Stop reason: HOSPADM

## 2023-02-01 RX ORDER — EPHEDRINE SULFATE 50 MG/ML
INJECTION, SOLUTION INTRAVENOUS
Status: DISCONTINUED | OUTPATIENT
Start: 2023-02-01 | End: 2023-02-01

## 2023-02-01 RX ORDER — ALBUMIN HUMAN 50 G/1000ML
SOLUTION INTRAVENOUS CONTINUOUS PRN
Status: DISCONTINUED | OUTPATIENT
Start: 2023-02-01 | End: 2023-02-01

## 2023-02-01 RX ORDER — INSULIN ASPART 100 [IU]/ML
0-15 INJECTION, SOLUTION INTRAVENOUS; SUBCUTANEOUS EVERY 6 HOURS PRN
Status: DISCONTINUED | OUTPATIENT
Start: 2023-02-01 | End: 2023-02-01 | Stop reason: HOSPADM

## 2023-02-01 RX ORDER — ALBUTEROL SULFATE 2.5 MG/.5ML
SOLUTION RESPIRATORY (INHALATION)
Status: COMPLETED
Start: 2023-02-01 | End: 2023-02-01

## 2023-02-01 RX ORDER — HYDROMORPHONE HYDROCHLORIDE 2 MG/ML
0.4 INJECTION, SOLUTION INTRAMUSCULAR; INTRAVENOUS; SUBCUTANEOUS EVERY 5 MIN PRN
Status: DISCONTINUED | OUTPATIENT
Start: 2023-02-01 | End: 2023-02-01 | Stop reason: HOSPADM

## 2023-02-01 RX ORDER — PROCHLORPERAZINE EDISYLATE 5 MG/ML
5 INJECTION INTRAMUSCULAR; INTRAVENOUS EVERY 30 MIN PRN
Status: DISCONTINUED | OUTPATIENT
Start: 2023-02-01 | End: 2023-02-01 | Stop reason: HOSPADM

## 2023-02-01 RX ORDER — VANCOMYCIN HYDROCHLORIDE 1 G/20ML
INJECTION, POWDER, LYOPHILIZED, FOR SOLUTION INTRAVENOUS
Status: DISCONTINUED | OUTPATIENT
Start: 2023-02-01 | End: 2023-02-01 | Stop reason: HOSPADM

## 2023-02-01 RX ORDER — MEPERIDINE HYDROCHLORIDE 25 MG/ML
12.5 INJECTION INTRAMUSCULAR; INTRAVENOUS; SUBCUTANEOUS ONCE AS NEEDED
Status: DISCONTINUED | OUTPATIENT
Start: 2023-02-01 | End: 2023-02-01 | Stop reason: HOSPADM

## 2023-02-01 RX ORDER — GLUCAGON 1 MG
1 KIT INJECTION
Status: DISCONTINUED | OUTPATIENT
Start: 2023-02-01 | End: 2023-02-01 | Stop reason: HOSPADM

## 2023-02-01 RX ORDER — DIPHENHYDRAMINE HYDROCHLORIDE 50 MG/ML
INJECTION INTRAMUSCULAR; INTRAVENOUS
Status: DISCONTINUED | OUTPATIENT
Start: 2023-02-01 | End: 2023-02-01

## 2023-02-01 RX ORDER — CEFAZOLIN SODIUM 1 G/3ML
INJECTION, POWDER, FOR SOLUTION INTRAMUSCULAR; INTRAVENOUS
Status: DISCONTINUED | OUTPATIENT
Start: 2023-02-01 | End: 2023-02-01

## 2023-02-01 RX ORDER — HYDROCODONE BITARTRATE AND ACETAMINOPHEN 5; 325 MG/1; MG/1
1 TABLET ORAL EVERY 6 HOURS PRN
Qty: 56 TABLET | Refills: 0 | Status: ON HOLD | OUTPATIENT
Start: 2023-02-01 | End: 2023-03-01 | Stop reason: HOSPADM

## 2023-02-01 RX ORDER — ETOMIDATE 2 MG/ML
INJECTION INTRAVENOUS
Status: DISCONTINUED | OUTPATIENT
Start: 2023-02-01 | End: 2023-02-01

## 2023-02-01 RX ORDER — ROCURONIUM BROMIDE 10 MG/ML
INJECTION, SOLUTION INTRAVENOUS
Status: DISCONTINUED | OUTPATIENT
Start: 2023-02-01 | End: 2023-02-01

## 2023-02-01 RX ORDER — LIDOCAINE HYDROCHLORIDE AND EPINEPHRINE 10; 10 MG/ML; UG/ML
INJECTION, SOLUTION INFILTRATION; PERINEURAL
Status: DISCONTINUED | OUTPATIENT
Start: 2023-02-01 | End: 2023-02-01 | Stop reason: HOSPADM

## 2023-02-01 RX ORDER — PHENYLEPHRINE HYDROCHLORIDE 10 MG/ML
INJECTION INTRAVENOUS
Status: DISCONTINUED | OUTPATIENT
Start: 2023-02-01 | End: 2023-02-01

## 2023-02-01 RX ORDER — ONDANSETRON 2 MG/ML
INJECTION INTRAMUSCULAR; INTRAVENOUS
Status: DISCONTINUED | OUTPATIENT
Start: 2023-02-01 | End: 2023-02-01

## 2023-02-01 RX ORDER — PROPOFOL 10 MG/ML
VIAL (ML) INTRAVENOUS
Status: DISCONTINUED | OUTPATIENT
Start: 2023-02-01 | End: 2023-02-01

## 2023-02-01 RX ORDER — FENTANYL CITRATE 50 UG/ML
INJECTION, SOLUTION INTRAMUSCULAR; INTRAVENOUS
Status: DISCONTINUED | OUTPATIENT
Start: 2023-02-01 | End: 2023-02-01

## 2023-02-01 RX ORDER — CEPHALEXIN 500 MG/1
500 CAPSULE ORAL 4 TIMES DAILY
Qty: 20 CAPSULE | Refills: 0 | Status: SHIPPED | OUTPATIENT
Start: 2023-02-01 | End: 2023-02-06

## 2023-02-01 RX ORDER — SODIUM CHLORIDE 0.9 % (FLUSH) 0.9 %
3 SYRINGE (ML) INJECTION
Status: DISCONTINUED | OUTPATIENT
Start: 2023-02-01 | End: 2023-02-01 | Stop reason: HOSPADM

## 2023-02-01 RX ORDER — ALBUTEROL SULFATE 2.5 MG/.5ML
2.5 SOLUTION RESPIRATORY (INHALATION)
Status: DISCONTINUED | OUTPATIENT
Start: 2023-02-01 | End: 2023-02-01 | Stop reason: HOSPADM

## 2023-02-01 RX ADMIN — FENTANYL CITRATE 100 MCG: 50 INJECTION, SOLUTION INTRAMUSCULAR; INTRAVENOUS at 07:02

## 2023-02-01 RX ADMIN — SODIUM CHLORIDE, SODIUM LACTATE, POTASSIUM CHLORIDE, AND CALCIUM CHLORIDE: .6; .31; .03; .02 INJECTION, SOLUTION INTRAVENOUS at 07:02

## 2023-02-01 RX ADMIN — PHENYLEPHRINE HYDROCHLORIDE 200 MCG: 10 INJECTION INTRAVENOUS at 08:02

## 2023-02-01 RX ADMIN — Medication 35 MG: at 08:02

## 2023-02-01 RX ADMIN — EPHEDRINE SULFATE 15 MG: 50 INJECTION INTRAVENOUS at 08:02

## 2023-02-01 RX ADMIN — PROPOFOL 50 MG: 10 INJECTION, EMULSION INTRAVENOUS at 08:02

## 2023-02-01 RX ADMIN — ETOMIDATE 14 MG: 2 INJECTION, SOLUTION INTRAVENOUS at 08:02

## 2023-02-01 RX ADMIN — SUGAMMADEX 200 MG: 100 INJECTION, SOLUTION INTRAVENOUS at 09:02

## 2023-02-01 RX ADMIN — ALBUMIN (HUMAN): 12.5 SOLUTION INTRAVENOUS at 08:02

## 2023-02-01 RX ADMIN — GLYCOPYRROLATE 0.2 MG: 0.2 INJECTION, SOLUTION INTRAMUSCULAR; INTRAVITREAL at 08:02

## 2023-02-01 RX ADMIN — EPHEDRINE SULFATE 10 MG: 50 INJECTION INTRAVENOUS at 08:02

## 2023-02-01 RX ADMIN — ALBUTEROL SULFATE 2.5 MG: 2.5 SOLUTION RESPIRATORY (INHALATION) at 06:02

## 2023-02-01 RX ADMIN — ROCURONIUM BROMIDE 20 MG: 10 SOLUTION INTRAVENOUS at 08:02

## 2023-02-01 RX ADMIN — ROCURONIUM BROMIDE 50 MG: 10 SOLUTION INTRAVENOUS at 08:02

## 2023-02-01 RX ADMIN — PHENYLEPHRINE HYDROCHLORIDE 0.8 MCG/KG/MIN: 10 INJECTION INTRAVENOUS at 08:02

## 2023-02-01 RX ADMIN — CEFAZOLIN 2 G: 330 INJECTION, POWDER, FOR SOLUTION INTRAMUSCULAR; INTRAVENOUS at 08:02

## 2023-02-01 RX ADMIN — DIPHENHYDRAMINE HYDROCHLORIDE 6.25 MG: 50 INJECTION, SOLUTION INTRAMUSCULAR; INTRAVENOUS at 08:02

## 2023-02-01 RX ADMIN — LIDOCAINE HYDROCHLORIDE 60 MG: 20 INJECTION, SOLUTION INTRAVENOUS at 08:02

## 2023-02-01 RX ADMIN — FENTANYL CITRATE 50 MCG: 50 INJECTION, SOLUTION INTRAMUSCULAR; INTRAVENOUS at 08:02

## 2023-02-01 RX ADMIN — PHENYLEPHRINE HYDROCHLORIDE 300 MCG: 10 INJECTION INTRAVENOUS at 09:02

## 2023-02-01 RX ADMIN — ONDANSETRON HYDROCHLORIDE 4 MG: 2 INJECTION INTRAMUSCULAR; INTRAVENOUS at 08:02

## 2023-02-01 NOTE — PATIENT INSTRUCTIONS
Neurosurgery Patient Information. Please follow ONLY the instructions that are checked below.    Activity Restrictions:  [x]  Return to work will be determined on an individual basis.  [x]  No lifting greater than 5-10 pounds.  [x]  Avoid bending and twisting the area of your surgery more than 45 degrees from neutral position in any direction.  [x]  No driving or operating machinery:  [x]  until cleared by your surgeon.  [x]  while taking narcotic pain medications or muscle relaxants.  [x]  No brace needed  [x]  Slowly increase your ambulation [walking] over the next 2 weeks as tolerated. The goal is to be walking 1-2 miles by the time of your 2 week post op appointment.   [x]  Walk on paved surfaces only. It is okay to walk up and down stairs while holding onto a side rail.  [x]  No sexual activity for 6 weeks.    Discharge Medication/Follow-up:  [x]  Please refer to discharge medication reconciliation form.  [x]  Please hold your Aspirin, okay to resume on 2/4. Please hold your Eliquis, okay to resume on 2/8.   [x]  Do not take ANY herbal supplements for 2 weeks after surgery.    [x]  Do not take ANY non-steroidal anti-inflammatory drugs (NSAIDS), including the following: ibuprofen, naprosyn, Aleve, Advil, Indocin, Mobic, or Celebrex for 6 weeks after surgery.   [x]  Prescriptions for appropriate medication will be given upon discharge.  [x]  Take docusate (Colace 100 mg): take one capsule a day as needed for constipation. You can get this over the counter.  [x]  Follow-up appointment:  [x]  10-14 days post-op for wound check by physician assistant/nurse  [x]  4-6 weeks with MD:  [x]  with x-rays  [x]  An appointment will be mailed to you.    Wound Care:  [x]  Remove bandage tomorrow. Then keep incision open to air.   [x]  You may shower on the 2nd day after your surgery. Keep the incision clean and dry at all times. Please cover the incision while showering and REMOVE once you have completed taking your shower.  Do not allow the force of water to hit the incision. If the incision gets damp, pat it dry. Do not rub or scrub the incision.  [x]  You cannot take a bath until 8 weeks after surgery.  [x]  The incision does not need to be cleaned with any water, soap, alcohol, peroxide, or other substance.    Call your doctor or go to the Emergency Room for any signs of infection, including: increased redness, drainage, pain, or fever (temperature ?101.5 for 24 hours). Call your doctor or go to the Emergency Room if there are any localized neurological changes; problems with speech, vision, numbness, tingling, weakness, or severe headache; or for other concerns.    Special Instructions:  [x]  No use of tobacco products.  [x]  Diet: Please eat a regular diet as tolerated.      Physicians need 3 days' notice for pain medicine to be refilled. Pain medicine will only be refilled between 8 AM and 5 PM, Monday through Friday, due to Food and Drug Administration regulation of documentation.        [facility] Neurosurgery Office: [phone]              [facility] Neurosurgery Office: [phone]   [place] Neurosurgery Office: [phone]

## 2023-02-01 NOTE — ANESTHESIA PROCEDURE NOTES
Intubation    Date/Time: 2/1/2023 8:13 AM  Performed by: Huey Doyle CRNA  Authorized by: Jeffrey Perez MD     Intubation:     Induction:  Intravenous    Intubated:  Postinduction    Mask Ventilation:  Easy with oral airway    Attempts:  1    Attempted By:  CRNA    Method of Intubation:  Video laryngoscopy    Blade:  Hawkins 4    Laryngeal View Grade: Grade I - full view of cords      Difficult Airway Encountered?: No      Complications:  None    Airway Device:  Oral endotracheal tube    Airway Device Size:  8.0    Style/Cuff Inflation:  Cuffed (inflated to minimal occlusive pressure)    Tube secured:  23    Secured at:  The lips    Placement Verified By:  Capnometry    Complicating Factors:  None    Findings Post-Intubation:  BS equal bilateral and atraumatic/condition of teeth unchanged

## 2023-02-01 NOTE — TRANSFER OF CARE
Anesthesia Transfer of Care Note    Patient: Kamar Muñoz    Procedure(s) Performed: Procedure(s) (LRB):  KYPHOPLASTY, SPINE, LUMBAR (N/A)    Patient location: PACU    Anesthesia Type: general    Transport from OR: Transported from OR on 6-10 L/min O2 by face mask with adequate spontaneous ventilation    Post pain: adequate analgesia    Post assessment: tolerated procedure well and no apparent anesthetic complications    Post vital signs: stable    Level of consciousness: sedated and responds to stimulation    Nausea/Vomiting: no nausea/vomiting    Complications: none    Transfer of care protocol was followed      Last vitals:   Visit Vitals  BP (!) 122/59   Pulse 66   Temp 36.6 °C (97.9 °F) (Oral)   Resp 18   SpO2 97%

## 2023-02-01 NOTE — PLAN OF CARE
Kamar Muñoz has met all discharge criteria from Phase II. Vital Signs are stable, ambulating  without difficulty. Discharge instructions given, patient verbalized understanding. Discharged from facility via wheelchair in stable condition.

## 2023-02-01 NOTE — PLAN OF CARE
Certification of Assistant at Surgery       Surgery Date: 2/1/2023     Participating Surgeons:  Surgeon(s) and Role:     * Kimberly Spicer MD - Primary  Talia Mars PA-C Assisting     Procedures:  Procedure(s) (LRB):  KYPHOPLASTY, SPINE, LUMBAR (N/A)    Assistant Surgeon's Certification of Necessity:  I understand that section 1842 (b) (6) (d) of the Social Security Act generally prohibits Medicare Part B reasonable charge payment for the services of assistants at surgery in teaching hospitals when qualified residents are available to furnish such services. I certify that the services for which payment is claimed were medically necessary, and that no qualified resident was available to perform the services. I further understand that these services are subject to post-payment review by the Medicare carrier.      Talia Mars PA-C    02/01/2023  9:13 AM

## 2023-02-01 NOTE — OR NURSING
Right radial arterial line discontinued. Manual pressure held for 15 min. Hemostasis achieved. Light pressure dressing  applied. Will continue to monitor site.

## 2023-02-01 NOTE — ANESTHESIA PROCEDURE NOTES
Arterial    Diagnosis: spinal stenosis    Patient location during procedure: holding area  Procedure start time: 2/1/2023 7:54 AM  Procedure end time: 2/1/2023 7:59 AM    Staffing  Authorizing Provider: Jeffrey Perez MD  Performing Provider: Jeffrey Perez MD    Anesthesiologist was present at the time of the procedure.  Arterial  Skin Prep: chlorhexidine gluconate  Local Infiltration: lidocaine  Orientation: right  Location: radial    Catheter Size: 20 G  Catheter placement by Ultrasound guidance. Heme positive aspiration all ports.   Vessel Caliber: small, patent, compressibility normal  Vascular Doppler:  not done  Needle advanced into vessel with real time Ultrasound guidance.  Sterile sheath used.Insertion Attempts: 1  Assessment  Dressing: secured with tape and tegaderm  Patient: Tolerated well

## 2023-02-01 NOTE — ANESTHESIA POSTPROCEDURE EVALUATION
Anesthesia Post Evaluation    Patient: Kamar Muñoz    Procedure(s) Performed: Procedure(s) (LRB):  KYPHOPLASTY, SPINE, LUMBAR (N/A)    Final Anesthesia Type: general      Patient location during evaluation: PACU  Patient participation: Yes- Able to Participate  Level of consciousness: awake and alert  Post-procedure vital signs: reviewed and stable  Pain management: adequate  Airway patency: patent  VENKATA mitigation strategies: Extubation while patient is awake  PONV status at discharge: No PONV  Anesthetic complications: no      Cardiovascular status: hemodynamically stable  Respiratory status: unassisted  Hydration status: euvolemic  Follow-up not needed.          Vitals Value Taken Time   /59 02/01/23 1100   Temp 36.2 °C (97.2 °F) 02/01/23 1100   Pulse 77 02/01/23 1112   Resp 16 02/01/23 1100   SpO2 91 % 02/01/23 1112   Vitals shown include unvalidated device data.      No case tracking events are documented in the log.      Pain/Patrice Score: Patrice Score: 9 (2/1/2023 10:30 AM)

## 2023-02-01 NOTE — OP NOTE
DATE OF PROCEDURE: 2/1/2023     PREOPERATIVE DIAGNOSES:   Closed compression fracture of body of L1 vertebra.    POSTOPERATIVE DIAGNOSES:   Closed compression fracture of body of L1 vertebra.    PROCEDURES PERFORMED:   L1 percutaneous vertebral body augmentation including fracture reduction with bi-pedicular cannulation.    Surgeon(s) and Role:     * Kimberly Spicer MD - Primary     * Talia Mars PA-C - assisting    There was no resident physician available to assist in the case.    ANESTHESIA: General    ESTIMATED BLOOD LOSS: 10 mL.    INDICATION FOR PROCEDURE: Kamar Muñoz is a 77 y.o. male who presents after a ground level fall in October 2022. He complained of low back pain without radiation at that time. He was found to have T12 and L1 compression fractures. More recently, he still complains of continued low back pain, worse with sitting and walking. Updated imaging studies of the lumbar spine show a persistent L1 compression fracture with STIR sequence signal changes on MRI in the L1 vertebral body. He failed conservative therapy with a TLSO brace. Therefore, the decision was made to take the patient to the operating room for a L1 vertebral body augmentation and kyphoplasty.    OPERATIVE NOTE: After obtaining informed consent, the patient was brought into the Operating Room. he was intubated and anesthetized by Anesthesia. Preoperative antibiotics were administered. The patient was placed prone on the 4-post Ollie table. Both AP and lateral fluoroscopy were brought into the field and the L1 pedicles were identified and marked on the skin. The patient was then prepped and draped in the standard sterile fashion. The skin was injected with 1% lidocaine with epinephrine. Two stab incisions were made through the skin and down through the fascia just lateral to the location of the pedicles at L1. A Jamshidi needle was then used to identify our entry point. The Jamshidi needle was advanced through the  pedicles bilaterally at L1 to the junction of the pedicle and the vertebral body. Once we were sure that we had not breached the pedicle wall, the Jamshidi needle was advanced approximately 1 cm past the vertebral body wall. The inner stylette was removed and a drill was advanced into the vertebral body under direct fluoroscopic guidance toward the anterior cortex in order to create a channel. After completing entry into the vertebral body, an inflatable bone tamp was inserted bilaterally through the cannulas and advanced under fluoroscopic guidance. Once both bone tamps were in position, they were inflated. We saw good expansion of the bone tamps under fluoroscopy. The balloon devices were then removed and again under fluoroscopic imaging and with the use of bone void fillers, internal fixation was achieved through a low pressure injection of DePuy [employer] Confidence bone cement. The cavity was filled with a total volume of 3 mL on the left and 5 mL on the right. We saw good filling of the bone past midline on both sides. Once the bone cement had hardened, the cannulas were removed. Final AP and lateral fluoroscopy was taken, which showed fracture reduction of the superior endplate of L1. The cement had very nicely filled the midline of the bone. The wounds were irrigated. Both incisions were closed in layers, Monocryl was used on the skin. The patient was flipped back supine on the   stretcher. He was extubated by Anesthesia and brought to the Recovery Room in stable condition. All counts were correct at the end of the case.

## 2023-02-02 VITALS
DIASTOLIC BLOOD PRESSURE: 57 MMHG | OXYGEN SATURATION: 97 % | HEART RATE: 78 BPM | TEMPERATURE: 97 F | SYSTOLIC BLOOD PRESSURE: 104 MMHG | RESPIRATION RATE: 18 BRPM

## 2023-02-07 ENCOUNTER — TELEPHONE (OUTPATIENT)
Dept: NEUROSURGERY | Facility: CLINIC | Age: 78
End: 2023-02-07
Payer: MEDICARE

## 2023-02-07 NOTE — TELEPHONE ENCOUNTER
----- Message from Naz Ceja sent at 2/7/2023  1:05 PM CST -----  Regarding: appt req  Contact: Jerel PRICE/ [facility]  Type: Appointment Request    Caller is requesting an appointment     Name of Caller:  Jerel RAO [facility]  Reason for appointment:  Follow Up / Post-op  Would the patient rather a call back or a response via [facility]? Call back  Best Call Back Number: [phone]  Additional Information: [facility] would like to speak w/ you regarding the Pt

## 2023-02-07 NOTE — TELEPHONE ENCOUNTER
Spoke with Jerel, he stated that patient has still been having some pain and discomfort and he didn't get any paperwork with post-op appts made. I offered Jerel an appt of 2/17 for pt, Jerel accepted.

## 2023-02-17 ENCOUNTER — CLINICAL SUPPORT (OUTPATIENT)
Dept: NEUROSURGERY | Facility: CLINIC | Age: 78
End: 2023-02-17
Payer: MEDICARE

## 2023-02-17 DIAGNOSIS — S22.080A COMPRESSION FRACTURE OF T12 VERTEBRA, INITIAL ENCOUNTER: Primary | ICD-10-CM

## 2023-02-17 DIAGNOSIS — R53.81 PHYSICAL DECONDITIONING: ICD-10-CM

## 2023-02-17 PROCEDURE — 99999 PR PBB SHADOW E&M-EST. PATIENT-LVL I: CPT | Mod: PBBFAC,,,

## 2023-02-17 PROCEDURE — 99999 PR PBB SHADOW E&M-EST. PATIENT-LVL I: ICD-10-PCS | Mod: PBBFAC,,,

## 2023-02-24 NOTE — DISCHARGE SUMMARY
[facility]  Neurosurgery  Discharge Summary      Patient Name: Kamar Muñoz  MRN: [id number]  Admission Date: 2/1/2023  Hospital Length of Stay: 0 days  Discharge Date and Time: 2/1/2023  1:30 PM  Attending Physician: No att. providers found   Discharging Provider: Talia Mars PA-C  Primary Care Provider: Basim Guerrero MD    HPI:   Mr. Muñoz is a 77-year-old male who is seeing me today in follow-up.  His last neurosurgery clinic appointment was on December 8, 2022 with Talia Mars PA-C.  He originally presented to the emergency room in October 2022 after a ground level fall at his nursing home.  He complained of low back pain at that time.  He denied any radiating, weakness, or saddle anesthesia.  He was found to have T12 and L1 compression fractures.  He was given a TLSO brace for pain control.  He was lost to follow-up for some time.  At the time of his last clinic appointment, complained of continued low back pain.  This was worse with sitting and walking.  This was improved with lying down.  An MRI of the lumbar spine was ordered at that time to see if the fracture was still subacute to acute.  He is here today to see me in follow-up.  Currently, he continues to complain of low back pain.  It is nonradiating.  His pain is minimal when he is lying down..  It is fairly significant when he is sitting or attempting to walk.  He denies any weakness.  He denies any bowel or bladder incontinence.       Procedure(s) (LRB):  KYPHOPLASTY, SPINE, LUMBAR (N/A)       Goals of Care Treatment Preferences:  Code Status: DNR      Consults: none    Significant Diagnostic Studies: none    Pending Diagnostic Studies:       None          There are no hospital problems to display for this patient.     Discharged Condition: good     Disposition: Home or Self Care    Follow Up:    Patient Instructions:      Notify your health care provider if you experience any of the following:   temperature >100.4     Notify your health care provider if you experience any of the following:  persistent nausea and vomiting or diarrhea     Notify your health care provider if you experience any of the following:  severe uncontrolled pain     Notify your health care provider if you experience any of the following:  redness, tenderness, or signs of infection (pain, swelling, redness, odor or green/yellow discharge around incision site)     Notify your health care provider if you experience any of the following:  difficulty breathing or increased cough     Notify your health care provider if you experience any of the following:  severe persistent headache     Notify your health care provider if you experience any of the following:  worsening rash     Notify your health care provider if you experience any of the following:  persistent dizziness, light-headedness, or visual disturbances     Notify your health care provider if you experience any of the following:  increased confusion or weakness     Activity as tolerated     Medications:  Reconciled Home Medications:      Medication List        START taking these medications      HYDROcodone-acetaminophen 5-325 mg per tablet  Commonly known as: NORCO  Take 1 tablet by mouth every 6 (six) hours as needed for Pain.            CONTINUE taking these medications      acetaminophen 650 MG Tbsr  Commonly known as: TYLENOL  Take 1 tablet (650 mg total) by mouth every 6 (six) hours as needed (pain).     amiodarone 200 MG Tab  Commonly known as: PACERONE  Take 1 tablet (200 mg total) by mouth once daily.     atorvastatin 40 MG tablet  Commonly known as: LIPITOR  Take 1 tablet (40 mg total) by mouth once daily.     blood sugar diagnostic Strp  1 strip by Misc.(Non-Drug; Combo Route) route 2 (two) times a day.     cetirizine 10 MG tablet  Commonly known as: ZYRTEC  Take 1 tablet (10 mg total) by mouth every evening.     ciprofloxacin HCl 500 MG tablet  Commonly known as: CIPRO      docusate sodium 100 MG capsule  Commonly known as: COLACE  Take 100 mg by mouth once daily at 6am.     gabapentin 100 MG capsule  Commonly known as: NEURONTIN  Take 1 capsule (100 mg total) by mouth 2 (two) times daily.     * glucose 4 GM chewable tablet  Take 4 tablets (16 g total) by mouth as needed for Low blood sugar (50 - 70).     * glucose 4 GM chewable tablet  Take 6 tablets (24 g total) by mouth as needed for Low blood sugar (< 50).     * insulin aspart U-100 100 unit/mL (3 mL) Inpn pen  Commonly known as: NovoLOG  Inject 1-10 Units into the skin before meals and at bedtime as needed (Hyperglycemia).     insulin detemir U-100 100 unit/mL (3 mL) Inpn pen  Commonly known as: Levemir FLEXTOUCH  Inject 6 Units into the skin 2 (two) times daily.     insulin lispro 100 unit/mL injection  Inject 4 Units into the skin.     JANUVIA 50 MG Tab  Generic drug: SITagliptin phosphate  Take 100 mg by mouth.     lacosamide 100 mg Tab  Commonly known as: VIMPAT  Take 1 tablet (100 mg total) by mouth every 12 (twelve) hours.     lancets Misc  Commonly known as: ONETOUCH ULTRASOFT LANCETS  1 lancet by Misc.(Non-Drug; Combo Route) route 2 (two) times a day.     LANTUS SOLOSTAR U-100 INSULIN SUBQ  Inject 12 Units into the skin 2 (two) times a day.     losartan 25 MG tablet  Commonly known as: COZAAR  Take 25 mg by mouth once daily.     MAGNESIUM ORAL  Take 400 mg by mouth once.     METAMUCIL (WITH SUGAR) 3.4 gram packet  Generic drug: psyllium husk (with sugar)  Take 1 packet by mouth 2 (two) times daily.     methocarbamoL 500 MG Tab  Commonly known as: ROBAXIN  Take 500 mg by mouth daily as needed.     metoprolol succinate 50 MG 24 hr tablet  Commonly known as: TOPROL-XL  Take 25 mg by mouth once daily.     nitroGLYCERIN 0.4 MG SL tablet  Commonly known as: NITROSTAT  Place 1 tablet (0.4 mg total) under the tongue every 5 (five) minutes as needed for Chest pain.     ondansetron 4 MG Tbdl  Commonly known as: ZOFRAN-ODT  Take 4  "mg by mouth every 6 (six) hours as needed.     pantoprazole 40 MG tablet  Commonly known as: PROTONIX  Take 1 tablet (40 mg total) by mouth once daily.     * pen needle, diabetic 30 gauge x 5/16" Ndle  Commonly known as: PEN NEEDLE  1 Units by Misc.(Non-Drug; Combo Route) route 3 (three) times daily.     * BD ULTRA-FINE SHORT PEN NEEDLE 31 gauge x 5/16" Ndle  Generic drug: pen needle, diabetic  3 (three) times daily.     polycarbophil 625 mg tablet  Commonly known as: FIBERCON  Take 1 tablet by mouth once daily.     sertraline 50 MG tablet  Commonly known as: ZOLOFT  Take 50 mg by mouth once daily.     sucralfate 1 gram tablet  Commonly known as: CARAFATE  Take 1 g by mouth 3 (three) times daily before meals.     tamsulosin 0.4 mg Cap  Commonly known as: FLOMAX  Take 1 capsule (0.4 mg total) by mouth every evening.     vitamin D 1000 units Tab  Commonly known as: VITAMIN D3  Take 1,000 Units by mouth once daily.           * This list has 5 medication(s) that are the same as other medications prescribed for you. Read the directions carefully, and ask your doctor or other care provider to review them with you.                STOP taking these medications      aspirin 81 MG Chew     clopidogreL 75 mg tablet  Commonly known as: PLAVIX     diclofenac sodium 1 % Gel  Commonly known as: VOLTAREN     ELIQUIS 5 mg Tab  Generic drug: apixaban     ibuprofen 400 MG tablet  Commonly known as: ADVIL,MOTRIN     MULTIVITAMIN ORAL            ASK your doctor about these medications      cephALEXin 500 MG capsule  Commonly known as: KEFLEX  Take 1 capsule (500 mg total) by mouth 4 (four) times daily. for 5 days  Ask about: Should I take this medication?     * insulin aspart U-100 100 unit/mL (3 mL) Inpn pen  Commonly known as: NovoLOG  Inject 15 Units into the skin 3 (three) times daily with meals.           * This list has 1 medication(s) that are the same as other medications prescribed for you. Read the directions carefully, and " ask your doctor or other care provider to review them with you.                  Talia Mars PA-C  Neurosurgery  [facility] ([place])

## 2023-02-27 ENCOUNTER — HOSPITAL ENCOUNTER (OUTPATIENT)
Facility: HOSPITAL | Age: 78
Discharge: NURSING, REHAB OR LONG-TERM CARE | End: 2023-03-01
Attending: STUDENT IN AN ORGANIZED HEALTH CARE EDUCATION/TRAINING PROGRAM | Admitting: HOSPITALIST
Payer: MEDICARE

## 2023-02-27 DIAGNOSIS — E78.5 DYSLIPIDEMIA ASSOCIATED WITH TYPE 2 DIABETES MELLITUS: ICD-10-CM

## 2023-02-27 DIAGNOSIS — R31.9 URINARY TRACT INFECTION WITH HEMATURIA, SITE UNSPECIFIED: ICD-10-CM

## 2023-02-27 DIAGNOSIS — R41.82 AMS (ALTERED MENTAL STATUS): ICD-10-CM

## 2023-02-27 DIAGNOSIS — N39.0 UTI (URINARY TRACT INFECTION): ICD-10-CM

## 2023-02-27 DIAGNOSIS — N39.0 URINARY TRACT INFECTION WITH HEMATURIA, SITE UNSPECIFIED: ICD-10-CM

## 2023-02-27 DIAGNOSIS — R41.82 ALTERED MENTAL STATUS, UNSPECIFIED ALTERED MENTAL STATUS TYPE: ICD-10-CM

## 2023-02-27 DIAGNOSIS — S42.034A CLOSED NONDISPLACED FRACTURE OF ACROMIAL END OF RIGHT CLAVICLE, INITIAL ENCOUNTER: ICD-10-CM

## 2023-02-27 DIAGNOSIS — Z79.4 TYPE 2 DIABETES MELLITUS WITH HYPERGLYCEMIA, WITH LONG-TERM CURRENT USE OF INSULIN: Primary | Chronic | ICD-10-CM
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
## 2023-02-27 DIAGNOSIS — E11.69 DYSLIPIDEMIA ASSOCIATED WITH TYPE 2 DIABETES MELLITUS: ICD-10-CM

## 2023-02-27 DIAGNOSIS — I48.0 PAROXYSMAL ATRIAL FIBRILLATION: Chronic | ICD-10-CM

## 2023-02-27 DIAGNOSIS — E11.65 TYPE 2 DIABETES MELLITUS WITH HYPERGLYCEMIA, WITH LONG-TERM CURRENT USE OF INSULIN: Primary | Chronic | ICD-10-CM

## 2023-02-27 LAB
ALBUMIN SERPL BCP-MCNC: 3.1 G/DL (ref 3.5–5.2)
ALP SERPL-CCNC: 132 U/L (ref 55–135)
ALT SERPL W/O P-5'-P-CCNC: 21 U/L (ref 10–44)
ANION GAP SERPL CALC-SCNC: 8 MMOL/L (ref 8–16)
AST SERPL-CCNC: 26 U/L (ref 10–40)
BACTERIA #/AREA URNS AUTO: ABNORMAL /HPF
BASOPHILS # BLD AUTO: 0.03 K/UL (ref 0–0.2)
BASOPHILS NFR BLD: 0.4 % (ref 0–1.9)
BILIRUB SERPL-MCNC: 0.7 MG/DL (ref 0.1–1)
BILIRUB UR QL STRIP: NEGATIVE
BUN SERPL-MCNC: 15 MG/DL (ref 8–23)
CALCIUM SERPL-MCNC: 9.1 MG/DL (ref 8.7–10.5)
CHLORIDE SERPL-SCNC: 103 MMOL/L (ref 95–110)
CLARITY UR REFRACT.AUTO: ABNORMAL
CO2 SERPL-SCNC: 25 MMOL/L (ref 23–29)
COLOR UR AUTO: YELLOW
CREAT SERPL-MCNC: 1 MG/DL (ref 0.5–1.4)
DIFFERENTIAL METHOD: ABNORMAL
EOSINOPHIL # BLD AUTO: 0.1 K/UL (ref 0–0.5)
EOSINOPHIL NFR BLD: 0.8 % (ref 0–8)
ERYTHROCYTE [DISTWIDTH] IN BLOOD BY AUTOMATED COUNT: 13.9 % (ref 11.5–14.5)
EST. GFR  (NO RACE VARIABLE): >60 ML/MIN/1.73 M^2
GLUCOSE SERPL-MCNC: 220 MG/DL (ref 70–110)
GLUCOSE UR QL STRIP: NEGATIVE
HCT VFR BLD AUTO: 37 % (ref 40–54)
HCV AB SERPL QL IA: NORMAL
HGB BLD-MCNC: 11.8 G/DL (ref 14–18)
HGB UR QL STRIP: ABNORMAL
HYALINE CASTS UR QL AUTO: 0 /LPF
IMM GRANULOCYTES # BLD AUTO: 0.02 K/UL (ref 0–0.04)
IMM GRANULOCYTES NFR BLD AUTO: 0.3 % (ref 0–0.5)
KETONES UR QL STRIP: ABNORMAL
LACTATE SERPL-SCNC: 1.1 MMOL/L (ref 0.5–2.2)
LACTATE SERPL-SCNC: 1.2 MMOL/L (ref 0.5–2.2)
LEUKOCYTE ESTERASE UR QL STRIP: ABNORMAL
LIPASE SERPL-CCNC: 6 U/L (ref 4–60)
LYMPHOCYTES # BLD AUTO: 1.3 K/UL (ref 1–4.8)
LYMPHOCYTES NFR BLD: 17.4 % (ref 18–48)
MCH RBC QN AUTO: 28.8 PG (ref 27–31)
MCHC RBC AUTO-ENTMCNC: 31.9 G/DL (ref 32–36)
MCV RBC AUTO: 90 FL (ref 82–98)
MICROSCOPIC COMMENT: ABNORMAL
MONOCYTES # BLD AUTO: 0.8 K/UL (ref 0.3–1)
MONOCYTES NFR BLD: 10.6 % (ref 4–15)
NEUTROPHILS # BLD AUTO: 5.1 K/UL (ref 1.8–7.7)
NEUTROPHILS NFR BLD: 70.5 % (ref 38–73)
NITRITE UR QL STRIP: NEGATIVE
NRBC BLD-RTO: 0 /100 WBC
PH UR STRIP: 8 [PH] (ref 5–8)
PLATELET # BLD AUTO: 212 K/UL (ref 150–450)
PMV BLD AUTO: 10 FL (ref 9.2–12.9)
POTASSIUM SERPL-SCNC: 3.8 MMOL/L (ref 3.5–5.1)
PROT SERPL-MCNC: 6.8 G/DL (ref 6–8.4)
PROT UR QL STRIP: ABNORMAL
RBC # BLD AUTO: 4.1 M/UL (ref 4.6–6.2)
RBC #/AREA URNS AUTO: >100 /HPF (ref 0–4)
SODIUM SERPL-SCNC: 136 MMOL/L (ref 136–145)
SP GR UR STRIP: 1.02 (ref 1–1.03)
TROPONIN I SERPL DL<=0.01 NG/ML-MCNC: 0.02 NG/ML (ref 0–0.03)
URN SPEC COLLECT METH UR: ABNORMAL
WBC # BLD AUTO: 7.26 K/UL (ref 3.9–12.7)
WBC #/AREA URNS AUTO: >100 /HPF (ref 0–5)
WBC CLUMPS UR QL AUTO: ABNORMAL

## 2023-02-27 PROCEDURE — 85025 COMPLETE CBC W/AUTO DIFF WBC: CPT | Performed by: STUDENT IN AN ORGANIZED HEALTH CARE EDUCATION/TRAINING PROGRAM

## 2023-02-27 PROCEDURE — 93005 ELECTROCARDIOGRAM TRACING: CPT

## 2023-02-27 PROCEDURE — 99285 EMERGENCY DEPT VISIT HI MDM: CPT | Mod: 25,CS

## 2023-02-27 PROCEDURE — 83690 ASSAY OF LIPASE: CPT | Performed by: STUDENT IN AN ORGANIZED HEALTH CARE EDUCATION/TRAINING PROGRAM

## 2023-02-27 PROCEDURE — 99285 EMERGENCY DEPT VISIT HI MDM: CPT | Mod: ,,, | Performed by: STUDENT IN AN ORGANIZED HEALTH CARE EDUCATION/TRAINING PROGRAM

## 2023-02-27 PROCEDURE — 87086 URINE CULTURE/COLONY COUNT: CPT | Performed by: STUDENT IN AN ORGANIZED HEALTH CARE EDUCATION/TRAINING PROGRAM

## 2023-02-27 PROCEDURE — 99285 PR EMERGENCY DEPT VISIT,LEVEL V: ICD-10-PCS | Mod: ,,, | Performed by: STUDENT IN AN ORGANIZED HEALTH CARE EDUCATION/TRAINING PROGRAM

## 2023-02-27 PROCEDURE — 25000003 PHARM REV CODE 250: Performed by: STUDENT IN AN ORGANIZED HEALTH CARE EDUCATION/TRAINING PROGRAM

## 2023-02-27 PROCEDURE — 93010 EKG 12-LEAD: ICD-10-PCS | Mod: ,,, | Performed by: INTERNAL MEDICINE

## 2023-02-27 PROCEDURE — 63600175 PHARM REV CODE 636 W HCPCS: Performed by: STUDENT IN AN ORGANIZED HEALTH CARE EDUCATION/TRAINING PROGRAM

## 2023-02-27 PROCEDURE — 84484 ASSAY OF TROPONIN QUANT: CPT | Performed by: STUDENT IN AN ORGANIZED HEALTH CARE EDUCATION/TRAINING PROGRAM

## 2023-02-27 PROCEDURE — 80053 COMPREHEN METABOLIC PANEL: CPT | Performed by: STUDENT IN AN ORGANIZED HEALTH CARE EDUCATION/TRAINING PROGRAM

## 2023-02-27 PROCEDURE — 86803 HEPATITIS C AB TEST: CPT | Performed by: PHYSICIAN ASSISTANT

## 2023-02-27 PROCEDURE — 81001 URINALYSIS AUTO W/SCOPE: CPT | Performed by: STUDENT IN AN ORGANIZED HEALTH CARE EDUCATION/TRAINING PROGRAM

## 2023-02-27 PROCEDURE — 96375 TX/PRO/DX INJ NEW DRUG ADDON: CPT

## 2023-02-27 PROCEDURE — 83605 ASSAY OF LACTIC ACID: CPT | Performed by: STUDENT IN AN ORGANIZED HEALTH CARE EDUCATION/TRAINING PROGRAM

## 2023-02-27 PROCEDURE — 93010 ELECTROCARDIOGRAM REPORT: CPT | Mod: ,,, | Performed by: INTERNAL MEDICINE

## 2023-02-27 PROCEDURE — 96365 THER/PROPH/DIAG IV INF INIT: CPT

## 2023-02-27 RX ORDER — ONDANSETRON 2 MG/ML
4 INJECTION INTRAMUSCULAR; INTRAVENOUS
Status: COMPLETED | OUTPATIENT
Start: 2023-02-27 | End: 2023-02-27

## 2023-02-27 RX ADMIN — ONDANSETRON 4 MG: 2 INJECTION INTRAMUSCULAR; INTRAVENOUS at 06:02

## 2023-02-27 RX ADMIN — CEFTRIAXONE 1 G: 1 INJECTION, POWDER, FOR SOLUTION INTRAMUSCULAR; INTRAVENOUS at 08:02

## 2023-02-27 NOTE — ED TRIAGE NOTES
78 yo M presents to the ED from assisted living facility for evaluation of AMS. Per report, pt appeared to be hallucinating and talking to people not in room. PT Aox3, disoriented to time, follows commands, speaking clearly. PT c/o fall 1-2 days ago with pain to head, L shoulder - no deformity noted, and L elbow - elbow wrapped in krilex with bright red blood noted. MD ABS, L elbow unwrapped - light bleeding noted with skin tears present. Wound noted to scalp - per records not related to fall. Pt c/o headache, nausea, intermittent abd pain. Physical exam signifigant for excoriation to head of penis and scrotum, wound to sacurm and skin tear to buttock.

## 2023-02-28 PROBLEM — N39.0 UTI (URINARY TRACT INFECTION): Status: ACTIVE | Noted: 2023-02-28

## 2023-02-28 PROBLEM — K59.00 CONSTIPATION: Status: ACTIVE | Noted: 2023-02-28

## 2023-02-28 PROBLEM — D64.9 ANEMIA: Status: ACTIVE | Noted: 2023-02-28

## 2023-02-28 PROBLEM — S42.034A CLOSED NONDISPLACED FRACTURE OF ACROMIAL END OF RIGHT CLAVICLE: Status: ACTIVE | Noted: 2023-02-28

## 2023-02-28 PROBLEM — Z95.5 STENTED CORONARY ARTERY: Status: RESOLVED | Noted: 2022-01-19 | Resolved: 2023-02-28

## 2023-02-28 LAB
ANION GAP SERPL CALC-SCNC: 9 MMOL/L (ref 8–16)
BASOPHILS # BLD AUTO: 0.04 K/UL (ref 0–0.2)
BASOPHILS NFR BLD: 0.6 % (ref 0–1.9)
BUN SERPL-MCNC: 16 MG/DL (ref 8–23)
CALCIUM SERPL-MCNC: 8.7 MG/DL (ref 8.7–10.5)
CHLORIDE SERPL-SCNC: 104 MMOL/L (ref 95–110)
CO2 SERPL-SCNC: 24 MMOL/L (ref 23–29)
CREAT SERPL-MCNC: 1 MG/DL (ref 0.5–1.4)
DIFFERENTIAL METHOD: ABNORMAL
EOSINOPHIL # BLD AUTO: 0.1 K/UL (ref 0–0.5)
EOSINOPHIL NFR BLD: 1.4 % (ref 0–8)
ERYTHROCYTE [DISTWIDTH] IN BLOOD BY AUTOMATED COUNT: 13.9 % (ref 11.5–14.5)
EST. GFR  (NO RACE VARIABLE): >60 ML/MIN/1.73 M^2
ESTIMATED AVG GLUCOSE: 209 MG/DL (ref 68–131)
GLUCOSE SERPL-MCNC: 318 MG/DL (ref 70–110)
HBA1C MFR BLD: 8.9 % (ref 4–5.6)
HCT VFR BLD AUTO: 33.3 % (ref 40–54)
HGB BLD-MCNC: 10.5 G/DL (ref 14–18)
IMM GRANULOCYTES # BLD AUTO: 0.02 K/UL (ref 0–0.04)
IMM GRANULOCYTES NFR BLD AUTO: 0.3 % (ref 0–0.5)
LYMPHOCYTES # BLD AUTO: 1.4 K/UL (ref 1–4.8)
LYMPHOCYTES NFR BLD: 21.2 % (ref 18–48)
MCH RBC QN AUTO: 28.7 PG (ref 27–31)
MCHC RBC AUTO-ENTMCNC: 31.5 G/DL (ref 32–36)
MCV RBC AUTO: 91 FL (ref 82–98)
MONOCYTES # BLD AUTO: 0.7 K/UL (ref 0.3–1)
MONOCYTES NFR BLD: 10.7 % (ref 4–15)
NEUTROPHILS # BLD AUTO: 4.4 K/UL (ref 1.8–7.7)
NEUTROPHILS NFR BLD: 65.8 % (ref 38–73)
NRBC BLD-RTO: 0 /100 WBC
PLATELET # BLD AUTO: 204 K/UL (ref 150–450)
PMV BLD AUTO: 9.5 FL (ref 9.2–12.9)
POCT GLUCOSE: 108 MG/DL (ref 70–110)
POCT GLUCOSE: 202 MG/DL (ref 70–110)
POCT GLUCOSE: 307 MG/DL (ref 70–110)
POCT GLUCOSE: 335 MG/DL (ref 70–110)
POCT GLUCOSE: 88 MG/DL (ref 70–110)
POTASSIUM SERPL-SCNC: 3.8 MMOL/L (ref 3.5–5.1)
RBC # BLD AUTO: 3.66 M/UL (ref 4.6–6.2)
SODIUM SERPL-SCNC: 137 MMOL/L (ref 136–145)
WBC # BLD AUTO: 6.66 K/UL (ref 3.9–12.7)

## 2023-02-28 PROCEDURE — 85025 COMPLETE CBC W/AUTO DIFF WBC: CPT | Performed by: HOSPITALIST

## 2023-02-28 PROCEDURE — 96366 THER/PROPH/DIAG IV INF ADDON: CPT

## 2023-02-28 PROCEDURE — 63600175 PHARM REV CODE 636 W HCPCS: Performed by: HOSPITALIST

## 2023-02-28 PROCEDURE — G0378 HOSPITAL OBSERVATION PER HR: HCPCS

## 2023-02-28 PROCEDURE — 25000003 PHARM REV CODE 250: Performed by: HOSPITALIST

## 2023-02-28 PROCEDURE — 99222 PR INITIAL HOSPITAL CARE,LEVL II: ICD-10-PCS | Mod: ,,, | Performed by: NURSE PRACTITIONER

## 2023-02-28 PROCEDURE — 96372 THER/PROPH/DIAG INJ SC/IM: CPT | Performed by: NURSE PRACTITIONER

## 2023-02-28 PROCEDURE — 80048 BASIC METABOLIC PNL TOTAL CA: CPT | Performed by: HOSPITALIST

## 2023-02-28 PROCEDURE — 96372 THER/PROPH/DIAG INJ SC/IM: CPT | Performed by: HOSPITALIST

## 2023-02-28 PROCEDURE — 83036 HEMOGLOBIN GLYCOSYLATED A1C: CPT | Performed by: HOSPITALIST

## 2023-02-28 PROCEDURE — 63600175 PHARM REV CODE 636 W HCPCS: Performed by: NURSE PRACTITIONER

## 2023-02-28 PROCEDURE — 25000003 PHARM REV CODE 250: Performed by: NURSE PRACTITIONER

## 2023-02-28 PROCEDURE — 99222 1ST HOSP IP/OBS MODERATE 55: CPT | Mod: ,,, | Performed by: NURSE PRACTITIONER

## 2023-02-28 RX ORDER — GLUCAGON 1 MG
1 KIT INJECTION
Status: DISCONTINUED | OUTPATIENT
Start: 2023-02-28 | End: 2023-03-01 | Stop reason: HOSPADM

## 2023-02-28 RX ORDER — NAPROXEN SODIUM 220 MG/1
81 TABLET, FILM COATED ORAL DAILY
Status: DISCONTINUED | OUTPATIENT
Start: 2023-02-28 | End: 2023-03-01 | Stop reason: HOSPADM

## 2023-02-28 RX ORDER — IBUPROFEN 200 MG
16 TABLET ORAL
Status: DISCONTINUED | OUTPATIENT
Start: 2023-02-28 | End: 2023-03-01 | Stop reason: HOSPADM

## 2023-02-28 RX ORDER — METOPROLOL SUCCINATE 25 MG/1
25 TABLET, EXTENDED RELEASE ORAL DAILY
Status: DISCONTINUED | OUTPATIENT
Start: 2023-02-28 | End: 2023-03-01 | Stop reason: HOSPADM

## 2023-02-28 RX ORDER — AMIODARONE HYDROCHLORIDE 200 MG/1
200 TABLET ORAL DAILY
Status: DISCONTINUED | OUTPATIENT
Start: 2023-02-28 | End: 2023-03-01 | Stop reason: HOSPADM

## 2023-02-28 RX ORDER — DEXTROSE 40 %
30 GEL (GRAM) ORAL
Status: DISCONTINUED | OUTPATIENT
Start: 2023-02-28 | End: 2023-03-01 | Stop reason: HOSPADM

## 2023-02-28 RX ORDER — TAMSULOSIN HYDROCHLORIDE 0.4 MG/1
0.4 CAPSULE ORAL NIGHTLY
Status: DISCONTINUED | OUTPATIENT
Start: 2023-02-28 | End: 2023-03-01

## 2023-02-28 RX ORDER — ATORVASTATIN CALCIUM 40 MG/1
40 TABLET, FILM COATED ORAL DAILY
Status: DISCONTINUED | OUTPATIENT
Start: 2023-02-28 | End: 2023-03-01 | Stop reason: HOSPADM

## 2023-02-28 RX ORDER — POLYETHYLENE GLYCOL 3350 17 G/17G
17 POWDER, FOR SOLUTION ORAL DAILY
Status: DISCONTINUED | OUTPATIENT
Start: 2023-02-28 | End: 2023-02-28

## 2023-02-28 RX ORDER — INSULIN ASPART 100 [IU]/ML
6 INJECTION, SOLUTION INTRAVENOUS; SUBCUTANEOUS
Status: DISCONTINUED | OUTPATIENT
Start: 2023-02-28 | End: 2023-02-28

## 2023-02-28 RX ORDER — POLYETHYLENE GLYCOL 3350 17 G/17G
17 POWDER, FOR SOLUTION ORAL 2 TIMES DAILY
Status: DISCONTINUED | OUTPATIENT
Start: 2023-02-28 | End: 2023-02-28

## 2023-02-28 RX ORDER — ACETAMINOPHEN 500 MG
1000 TABLET ORAL EVERY 8 HOURS
Status: DISCONTINUED | OUTPATIENT
Start: 2023-02-28 | End: 2023-03-01 | Stop reason: HOSPADM

## 2023-02-28 RX ORDER — SUCRALFATE 1 G/1
1 TABLET ORAL
Status: DISCONTINUED | OUTPATIENT
Start: 2023-02-28 | End: 2023-03-01 | Stop reason: HOSPADM

## 2023-02-28 RX ORDER — INSULIN ASPART 100 [IU]/ML
1-10 INJECTION, SOLUTION INTRAVENOUS; SUBCUTANEOUS
Status: DISCONTINUED | OUTPATIENT
Start: 2023-02-28 | End: 2023-02-28

## 2023-02-28 RX ORDER — DOCUSATE SODIUM 100 MG/1
100 CAPSULE, LIQUID FILLED ORAL DAILY
Status: DISCONTINUED | OUTPATIENT
Start: 2023-02-28 | End: 2023-03-01 | Stop reason: HOSPADM

## 2023-02-28 RX ORDER — INSULIN ASPART 100 [IU]/ML
5 INJECTION, SOLUTION INTRAVENOUS; SUBCUTANEOUS
Status: DISCONTINUED | OUTPATIENT
Start: 2023-02-28 | End: 2023-03-01 | Stop reason: HOSPADM

## 2023-02-28 RX ORDER — SODIUM CHLORIDE 0.9 % (FLUSH) 0.9 %
10 SYRINGE (ML) INJECTION
Status: DISCONTINUED | OUTPATIENT
Start: 2023-02-28 | End: 2023-03-01 | Stop reason: HOSPADM

## 2023-02-28 RX ORDER — INSULIN ASPART 100 [IU]/ML
0-5 INJECTION, SOLUTION INTRAVENOUS; SUBCUTANEOUS
Status: DISCONTINUED | OUTPATIENT
Start: 2023-02-28 | End: 2023-03-01 | Stop reason: HOSPADM

## 2023-02-28 RX ORDER — DEXTROSE 40 %
15 GEL (GRAM) ORAL
Status: DISCONTINUED | OUTPATIENT
Start: 2023-02-28 | End: 2023-03-01 | Stop reason: HOSPADM

## 2023-02-28 RX ADMIN — ATORVASTATIN CALCIUM 40 MG: 40 TABLET, FILM COATED ORAL at 08:02

## 2023-02-28 RX ADMIN — CEFTRIAXONE 1 G: 1 INJECTION, POWDER, FOR SOLUTION INTRAMUSCULAR; INTRAVENOUS at 05:02

## 2023-02-28 RX ADMIN — INSULIN ASPART 4 UNITS: 100 INJECTION, SOLUTION INTRAVENOUS; SUBCUTANEOUS at 12:02

## 2023-02-28 RX ADMIN — SUCRALFATE 1 G: 1 TABLET ORAL at 10:02

## 2023-02-28 RX ADMIN — INSULIN ASPART 8 UNITS: 100 INJECTION, SOLUTION INTRAVENOUS; SUBCUTANEOUS at 08:02

## 2023-02-28 RX ADMIN — ACETAMINOPHEN 1000 MG: 500 TABLET ORAL at 09:02

## 2023-02-28 RX ADMIN — TAMSULOSIN HYDROCHLORIDE 0.4 MG: 0.4 CAPSULE ORAL at 03:02

## 2023-02-28 RX ADMIN — ACETAMINOPHEN 1000 MG: 500 TABLET ORAL at 03:02

## 2023-02-28 RX ADMIN — METOPROLOL SUCCINATE 25 MG: 25 TABLET, EXTENDED RELEASE ORAL at 08:02

## 2023-02-28 RX ADMIN — SUCRALFATE 1 G: 1 TABLET ORAL at 03:02

## 2023-02-28 RX ADMIN — INSULIN DETEMIR 18 UNITS: 100 INJECTION, SOLUTION SUBCUTANEOUS at 10:02

## 2023-02-28 RX ADMIN — INSULIN ASPART 5 UNITS: 100 INJECTION, SOLUTION INTRAVENOUS; SUBCUTANEOUS at 12:02

## 2023-02-28 RX ADMIN — TAMSULOSIN HYDROCHLORIDE 0.4 MG: 0.4 CAPSULE ORAL at 09:02

## 2023-02-28 RX ADMIN — SITAGLIPTIN 50 MG: 25 TABLET, FILM COATED ORAL at 01:02

## 2023-02-28 RX ADMIN — ASPIRIN 81 MG: 81 TABLET, CHEWABLE ORAL at 08:02

## 2023-02-28 RX ADMIN — INSULIN ASPART 4 UNITS: 100 INJECTION, SOLUTION INTRAVENOUS; SUBCUTANEOUS at 04:02

## 2023-02-28 RX ADMIN — AMIODARONE HYDROCHLORIDE 200 MG: 200 TABLET ORAL at 08:02

## 2023-02-28 RX ADMIN — SUCRALFATE 1 G: 1 TABLET ORAL at 06:02

## 2023-02-28 NOTE — ASSESSMENT & PLAN NOTE
2/28 CT abdomen pelvis - Markedly distended rectum containing a large stool ball with associated rectal wall thickening and mild adjacent fat stranding.  Findings are suggestive of fecal impaction with possible stercoral colitis. Significant volume retained stool throughout the remaining colon suggesting constipation.     resolved. had 4 BMs overnight

## 2023-02-28 NOTE — SUBJECTIVE & OBJECTIVE
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    Hyperlipidemia     Hypertension     Kidney stone     Migraine headache     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 08/02/2012    Paroxysmal atrial fibrillation     Pressure sore     STEMI involving right coronary artery 01/09/2022    Type II or unspecified type diabetes mellitus with neurological manifestations, uncontrolled(250.62) 03/08/2013    Urinary tract infection            Review of patient's allergies indicates:   Allergen Reactions    Iodine      Other reaction(s): swelling  Other reaction(s): Itching  Other reaction(s): Rash / IVP       No current facility-administered medications on file prior to encounter.     Current Outpatient Medications on File Prior to Encounter   Medication Sig    acetaminophen (TYLENOL) 650 MG TbSR Take 1 tablet (650 mg total) by mouth every 6 (six) hours as needed (pain).    amiodarone (PACERONE) 200 MG Tab Take 1 tablet (200 mg total) by mouth once daily.    atorvastatin (LIPITOR) 40 MG tablet Take 1 tablet (40 mg total) by mouth once daily.    BD ULTRA-FINE SHORT PEN NEEDLE 31 gauge x 5/16" Ndle 3 (three) times daily.    blood sugar diagnostic Strp 1 strip by Misc.(Non-Drug; Combo Route) route 2 (two) times a day.    cetirizine (ZYRTEC) 10 MG tablet Take 1 tablet (10 mg total) by mouth every evening.    ciprofloxacin HCl (CIPRO) 500 MG tablet     docusate sodium (COLACE) 100 MG capsule Take 100 mg by mouth once daily at 6am.    gabapentin (NEURONTIN) 100 MG capsule Take 1 capsule (100 mg total) by mouth 2 (two) times daily.    glucose 4 GM chewable tablet Take 4 tablets (16 g total) by mouth as needed for Low blood sugar (50 - 70).    glucose 4 GM " "chewable tablet Take 6 tablets (24 g total) by mouth as needed for Low blood sugar (< 50).    HYDROcodone-acetaminophen (NORCO) 5-325 mg per tablet Take 1 tablet by mouth every 6 (six) hours as needed for Pain.    insulin aspart U-100 (NOVOLOG) 100 unit/mL (3 mL) InPn pen Inject 1-10 Units into the skin before meals and at bedtime as needed (Hyperglycemia).    insulin aspart U-100 (NOVOLOG) 100 unit/mL (3 mL) InPn pen Inject 15 Units into the skin 3 (three) times daily with meals. (Patient taking differently: Inject 5 Units into the skin 3 (three) times daily with meals.)    insulin detemir U-100 (LEVEMIR FLEXTOUCH) 100 unit/mL (3 mL) SubQ InPn pen Inject 6 Units into the skin 2 (two) times daily.    insulin glargine,hum.rec.anlog (LANTUS SOLOSTAR U-100 INSULIN SUBQ) Inject 12 Units into the skin 2 (two) times a day.    insulin lispro 100 unit/mL injection Inject 4 Units into the skin.    JANUVIA 50 mg Tab Take 100 mg by mouth.    lacosamide (VIMPAT) 100 mg Tab Take 1 tablet (100 mg total) by mouth every 12 (twelve) hours.    lancets (ONETOUCH ULTRASOFT LANCETS) Misc 1 lancet by Misc.(Non-Drug; Combo Route) route 2 (two) times a day.    losartan (COZAAR) 25 MG tablet Take 25 mg by mouth once daily.    MAGNESIUM ORAL Take 400 mg by mouth once.    methocarbamoL (ROBAXIN) 500 MG Tab Take 500 mg by mouth daily as needed.    metoprolol succinate (TOPROL-XL) 50 MG 24 hr tablet Take 25 mg by mouth once daily.    nitroGLYCERIN (NITROSTAT) 0.4 MG SL tablet Place 1 tablet (0.4 mg total) under the tongue every 5 (five) minutes as needed for Chest pain.    ondansetron (ZOFRAN-ODT) 4 MG TbDL Take 4 mg by mouth every 6 (six) hours as needed.    pantoprazole (PROTONIX) 40 MG tablet Take 1 tablet (40 mg total) by mouth once daily.    pen needle, diabetic (PEN NEEDLE) 30 gauge x 5/16" Ndle 1 Units by Misc.(Non-Drug; Combo Route) route 3 (three) times daily.    polycarbophil (FIBERCON) 625 mg tablet Take 1 tablet by mouth once daily. "     psyllium husk, with sugar, (METAMUCIL, WITH SUGAR,) 3.4 gram packet Take 1 packet by mouth 2 (two) times daily.    sertraline (ZOLOFT) 50 MG tablet Take 50 mg by mouth once daily.    sucralfate (CARAFATE) 1 gram tablet Take 1 g by mouth 3 (three) times daily before meals.    tamsulosin (FLOMAX) 0.4 mg Cap Take 1 capsule (0.4 mg total) by mouth every evening.    vitamin D (VITAMIN D3) 1000 units Tab Take 1,000 Units by mouth once daily.    [DISCONTINUED] albuterol (PROVENTIL/VENTOLIN HFA) 90 mcg/actuation inhaler Inhale 2 puffs into the lungs every 4 (four) hours as needed for Wheezing (Pt states he will take it on his own. MDI placed by bedside.). Rescue    [DISCONTINUED] blood-glucose meter,continuous (DEXCOM G5 ) Misc 1 Device by Misc.(Non-Drug; Combo Route) route once daily at 6am.    [DISCONTINUED] blood-glucose transmitter (DEXCOM G5 TRANSMITTER) Stephanie 1 Device by Misc.(Non-Drug; Combo Route) route once daily at 6am.    [DISCONTINUED] diltiaZEM (CARDIZEM CD) 120 MG Cp24 Take 1 capsule (120 mg total) by mouth once daily.    [DISCONTINUED] metFORMIN (GLUCOPHAGE) 1000 MG tablet Take 1 tablet (1,000 mg total) by mouth 2 (two) times daily with meals.     Family History       Problem Relation (Age of Onset)    Diabetes Father          Tobacco Use    Smoking status: Former     Packs/day: 1.50     Years: 25.00     Pack years: 37.50     Types: Cigarettes     Quit date: 1983     Years since quittin.1    Smokeless tobacco: Never   Substance and Sexual Activity    Alcohol use: No    Drug use: No    Sexual activity: Yes     Partners: Female     Review of Systems   Constitutional: Negative.    HENT: Negative.     Eyes: Negative.    Respiratory: Negative.     Cardiovascular: Negative.    Gastrointestinal: Negative.    Endocrine: Negative.    Genitourinary: Negative.    Neurological: Negative.    Hematological: Negative.    Objective:     Vital Signs (Most Recent):  Temp: 98 °F (36.7 °C) (23  1724)  Pulse: 65 (02/27/23 2004)  Resp: 17 (02/27/23 2004)  BP: (!) 172/79 (02/27/23 2004)  SpO2: 96 % (02/27/23 2004)   Vital Signs (24h Range):  Temp:  [97.8 °F (36.6 °C)-98 °F (36.7 °C)] 98 °F (36.7 °C)  Pulse:  [61-66] 65  Resp:  [14-18] 17  SpO2:  [95 %-98 %] 96 %  BP: (134-172)/(74-80) 172/79     Weight: 74.4 kg (164 lb)  Body mass index is 21.64 kg/m².    Physical Exam  Constitutional:       Appearance: Normal appearance.   HENT:      Head: Normocephalic and atraumatic.   Eyes:      Extraocular Movements: Extraocular movements intact.      Pupils: Pupils are equal, round, and reactive to light.   Cardiovascular:      Rate and Rhythm: Normal rate and regular rhythm.   Pulmonary:      Effort: Pulmonary effort is normal.      Breath sounds: Normal breath sounds.   Abdominal:      General: Abdomen is flat. Bowel sounds are normal.      Palpations: Abdomen is soft.   Skin:     General: Skin is warm.      Capillary Refill: Capillary refill takes less than 2 seconds.   Neurological:      General: No focal deficit present.      Mental Status: He is alert and oriented to person, place, and time.         CRANIAL NERVES     CN III, IV, VI   Pupils are equal, round, and reactive to light.     Significant Labs: All pertinent labs within the past 24 hours have been reviewed.    Significant Imaging: I have reviewed all pertinent imaging results/findings within the past 24 hours.

## 2023-02-28 NOTE — PROGRESS NOTES
[facility] ([place] [id number])  [facility] Medicine  Progress Note    Patient Name: Kamar Muñoz  MRN: [id number]  Patient Class: OP- Observation   Admission Date: 2/27/2023  Length of Stay: 0 days  Attending Physician: Sudhakar Kan MD  Primary Care Provider: Basim Guerrero MD        Subjective:     Principal Problem:UTI (urinary tract infection)        HPI:  77-year-old male with past medical history of type 2 diabetes mellitus, hypertension, paroxysmal atrial fibrillation,HFmEF, MI presents from nursing home for mechanical fall.  Patient was recently admitted in early February for lumbar fracture following a fall to the Neurosurgery Service.  Presents today with a fall appeared confused.  For me patient appeared alert and oriented x2.  He denies having any fevers chills nausea vomiting dysuria polyuria.  Skeletal workup showed questionable fracture in his right clavicle and also patient complains of having tenderness and unable to move his arm above head due to pain.  He was also found to have elevated white blood cell count with CT abdominal findings concerning for pyelonephritis.  Also he was found to have disimpacted stool on CT however on manual disimpaction he had soft stool and also had a bowel movement as per ER staff.       Overview/Hospital Course:  2/28 Glucose in 300s.   A1c was 10.6. endocrine consulted . UC pending.  continue ceftriaxone . Closed nondisplaced fracture of acromial end of right clavicle with tenderness on moving his right arm . continue sling and he needs to follow with Orthopedics as outpatient. on aspirin Plavix were discontinued on his prior admission. started  on aspirin with history of MI. Plavix and Eliquis was held with history of multiple falls, need to discuss with family regarding continuation of Eliquis  .CT abdomen pelvis - Markedly distended rectum containing a large stool ball with associated rectal wall thickening and mild adjacent fat stranding.   Findings are suggestive of fecal impaction with possible stercoral colitis. Significant volume retained stool throughout the remaining colon suggesting constipation. constipation resolved. hea has 3 BMs since admission . patient is s/p Kyphoplasty L1 - Lumbar spine on 2/1/2023 by Dr. Spicer. s/p orthopedic surgery eval -right distal third clavicle fracture, closed, intact neurovascularity. Sling for comfort RUE. NWB RUE, begin gentle ROM in 2 weeks. PT/OT Consulted                       Review of Systems:   Pain scale:  7/10   Constitutional:  fever,  chills, headache, vision loss, hearing loss, weight loss, Generalized weakness, falls, loss of smell, loss of taste, poor appetite,  sore throat  Respiratory: cough, shortness of breath.   Cardiovascular: chest pain, dizziness, palpitations, orthopnea, swelling of feet, syncope  Gastrointestinal: nausea, vomiting, abdominal pain, diarrhea, black stool,  blood in stool, change in bowel habits  Genitourinary: hematuria, dysuria, urgency, frequency  Integument/Breast: rash,  pruritis  Hematologic/Lymphatic: easy bruising, lymphadenopathy  Musculoskeletal: arthralgias- right shoulder  , myalgias, back pain, neck pain, knee pain  Neurological: confusion, seizures, tremors, slurred speech  Behavioral/Psych:  depression, anxiety, auditory or visual hallucinations     OBJECTIVE:     Physical Exam:  Body mass index is 21.06 kg/m².    Constitutional: Appears thin built   Head: Normocephalic and atraumatic.   Neck: Normal range of motion. Neck supple.   Cardiovascular: Normal heart rate.  Regular heart rhythm.  Pulmonary/Chest: Effort normal.   Abdominal: No distension.  No tenderness  Musculoskeletal: Normal range of motion. No edema. mild tremors. Pain with ROM right shoulder   Neurological: Alert and oriented to person, place, and time.   Skin: Skin is warm and dry. + nodular skin growth scalp  Psychiatric: Normal mood and affect. Behavior is normal.                  Vital  Signs  Temp: 97.6 °F (36.4 °C) (02/28/23 1700)  Pulse: 63 (02/28/23 1700)  Resp: 15 (02/28/23 1700)  BP: 123/63 (02/28/23 1700)  SpO2: 95 % (02/28/23 1700)     24 Hour VS Range    Temp:  [97.6 °F (36.4 °C)-98.1 °F (36.7 °C)]   Pulse:  [63-72]   Resp:  [11-20]   BP: (115-172)/(60-79)   SpO2:  [91 %-96 %]     Intake/Output Summary (Last 24 hours) at 2/28/2023 1931  Last data filed at 2/28/2023 0616  Gross per 24 hour   Intake 50 ml   Output 400 ml   Net -350 ml         I/O This Shift:  No intake/output data recorded.    Wt Readings from Last 3 Encounters:   02/27/23 74.4 kg (164 lb)   01/30/23 74.4 kg (164 lb)   01/30/23 74.4 kg (164 lb)       I have personally reviewed the vitals and recorded Intake/Output     Laboratory/Diagnostic Data:    CBC/Anemia Labs: Coags:    Recent Labs   Lab 02/27/23  1827 02/28/23  0600   WBC 7.26 6.66   HGB 11.8* 10.5*   HCT 37.0* 33.3*    204   MCV 90 91   RDW 13.9 13.9    No results for input(s): PT, INR, APTT in the last 168 hours.     Chemistries: ABG:   Recent Labs   Lab 02/27/23  1827 02/28/23  0600    137   K 3.8 3.8    104   CO2 25 24   BUN 15 16   CREATININE 1.0 1.0   CALCIUM 9.1 8.7   PROT 6.8  --    BILITOT 0.7  --    ALKPHOS 132  --    ALT 21  --    AST 26  --     No results for input(s): PH, PCO2, PO2, HCO3, POCSATURATED, BE in the last 168 hours.     POCT Glucose: HbA1c:    Recent Labs   Lab 02/28/23  0418 02/28/23  0821 02/28/23  1200 02/28/23  1658   POCTGLUCOSE 335* 307* 202* 88    Hemoglobin A1C   Date Value Ref Range Status   02/28/2023 8.9 (H) 4.0 - 5.6 % Final     Comment:     ADA Screening Guidelines:  5.7-6.4%  Consistent with prediabetes  >or=6.5%  Consistent with diabetes    High levels of fetal hemoglobin interfere with the HbA1C  assay. Heterozygous hemoglobin variants (HbS, HgC, etc)do  not significantly interfere with this assay.   However, presence of multiple variants may affect accuracy.     10/05/2022 10.6 (H) 4.0 - 5.6 % Final      Comment:     ADA Screening Guidelines:  5.7-6.4%  Consistent with prediabetes  >or=6.5%  Consistent with diabetes    High levels of fetal hemoglobin interfere with the HbA1C  assay. Heterozygous hemoglobin variants (HbS, HgC, etc)do  not significantly interfere with this assay.   However, presence of multiple variants may affect accuracy.     04/05/2022 9.7 (H) 4.0 - 5.6 % Final     Comment:     ADA Screening Guidelines:  5.7-6.4%  Consistent with prediabetes  >or=6.5%  Consistent with diabetes    High levels of fetal hemoglobin interfere with the HbA1C  assay. Heterozygous hemoglobin variants (HbS, HgC, etc)do  not significantly interfere with this assay.   However, presence of multiple variants may affect accuracy.          Cardiac Enzymes: Ejection Fractions:    Recent Labs     02/27/23  1827   TROPONINI 0.020    EF   Date Value Ref Range Status   01/30/2023 45 % Final   01/26/2022 50 % Final          Recent Labs   Lab 02/27/23  1845   COLORU Yellow   APPEARANCEUA Hazy*   PHUR 8.0   SPECGRAV 1.020   PROTEINUA 1+*   GLUCUA Negative   KETONESU Trace*   BILIRUBINUA Negative   OCCULTUA 3+*   NITRITE Negative   LEUKOCYTESUR 3+*   RBCUA >100*   WBCUA >100*   BACTERIA Moderate*   HYALINECASTS 0       Procalcitonin (ng/mL)   Date Value   10/05/2022 0.69 (H)   04/12/2022 1.18 (H)   02/19/2022 0.93 (H)     Lactate (Lactic Acid) (mmol/L)   Date Value   02/27/2023 1.2   02/27/2023 1.1   10/12/2022 2.2   10/12/2022 3.2 (H)   10/07/2022 2.9 (H)     BNP (pg/mL)   Date Value   03/09/2022 568 (H)   02/19/2022 481 (H)   01/25/2022 658 (H)   02/01/2019 60   11/27/2018 33     Sed Rate   Date Value   02/19/2022 92 mm/Hr (H)   06/01/2007 3 mm/hr     D-Dimer (mg/L FEU)   Date Value   03/07/2022 0.43   03/05/2022 0.50 (H)   03/03/2022 0.50 (H)   03/01/2022 0.41   02/27/2022 0.37   02/25/2022 0.47   02/21/2022 0.50 (H)   02/19/2022 0.84 (H)     Ferritin (ng/mL)   Date Value   03/07/2022 334 (H)   03/05/2022 354 (H)   03/03/2022 300    03/01/2022 274   02/27/2022 312 (H)   02/25/2022 337 (H)   02/21/2022 481 (H)   02/19/2022 564 (H)     LD (U/L)   Date Value   03/07/2022 204   03/05/2022 188   03/03/2022 175   03/01/2022 179   02/27/2022 217   02/25/2022 279 (H)   02/21/2022 294 (H)   02/19/2022 318 (H)     Troponin I (ng/mL)   Date Value   02/27/2023 0.020   10/05/2022 0.015   06/30/2022 0.014   04/13/2022 0.074 (H)   04/12/2022 0.064 (H)   04/11/2022 0.021   04/05/2022 0.026   03/05/2022 0.019     CPK (U/L)   Date Value   04/12/2022 47   02/19/2022 95   01/13/2022 271 (H)   10/21/2011 188   01/08/2008 140   06/01/2007 83   05/21/2007 403 (H)   04/30/2007 293 (H)     Results for orders placed or performed in visit on 05/05/14   Vitamin D   Result Value Ref Range    Vit D, 25-Hydroxy 24 (L) 30 - 96 ng/mL     SARS-CoV2 (COVID-19) Qualitative PCR (no units)   Date Value   10/14/2022 Not Detected   10/11/2022 Not Detected   05/17/2022 Not Detected   05/03/2022 Not Detected   03/21/2022 Not Detected   03/17/2022 Not Detected   03/14/2022 Not Detected   03/08/2022 Not Detected     POC Rapid COVID (no units)   Date Value   04/05/2022 Negative   02/17/2022 Positive (A)   01/25/2022 Negative   01/12/2022 Negative   01/09/2022 Negative       Microbiology labs for the last week  Microbiology Results (last 7 days)       Procedure Component Value Units Date/Time    Urine culture [[id number]] Collected: 02/27/23 1846    Order Status: No result Specimen: Urine Updated: 02/27/23 1939            Reviewed and noted in plan where applicable- Please see chart for full lab data.    Lines/Drains:       Peripheral IV - Single Lumen 02/27/23 1828 20 G Anterior;Left Forearm (Active)   Site Assessment Clean;Dry;Intact 02/28/23 0215   Extremity Assessment Distal to IV No abnormal discoloration;No redness;No swelling;No warmth 02/28/23 0215   Line Status Flushed;Saline locked 02/28/23 0215   Dressing Status Clean;Dry;Intact 02/28/23 0215   Dressing Intervention Integrity  maintained 02/28/23 0215   Number of days: 0       Imaging  ECG Results              EKG 12-lead (Final result)  Result time 02/28/23 12:42:51      Final result by Interface, Lab In [facility] (02/28/23 12:42:51)               Narrative:    Test Reason : R41.82,    Vent. Rate : 064 BPM     Atrial Rate : 064 BPM     P-R Int : 148 ms          QRS Dur : 108 ms      QT Int : 458 ms       P-R-T Axes : 000 051 108 degrees     QTc Int : 472 ms    Baseline Artifact  Baseline wander  Normal sinus rhythm  T wave abnormality, consider lateral ischemia  Prolonged QT  Abnormal ECG  When compared with ECG of 24-OCT-2022 19:32,  Premature ventricular complexes are no longer Present  Confirmed by Iva SHAW, Arik ([id number]) on 2/28/2023 12:42:44 PM    Referred By: GAGAN   SELF           Confirmed By:Arik Enrique MD                                  Results for orders placed during the hospital encounter of 01/30/23    Echo    Interpretation Summary  · The left ventricle is normal in size with mildly decreased systolic function.  · The estimated ejection fraction is 45%.  · There are segmental left ventricular wall motion abnormalities.  · There is abnormal septal wall motion.  · Grade I left ventricular diastolic dysfunction.  · Normal right ventricular size with normal right ventricular systolic function.  · Mild mitral regurgitation.  · Normal central venous pressure (3 mmHg).  · The estimated PA systolic pressure is 17 mmHg.      X-Ray Clavicle Right  Narrative: EXAMINATION:  XR CLAVICLE RIGHT    CLINICAL HISTORY:  fx;    TECHNIQUE:  Two views of the right clavicle were performed.    COMPARISON:  None    FINDINGS:  The right clavicle demonstrates minimal cortical irregularity distally near the acromioclavicular joint.  There is no well define linear fracture.  The configuration of the distal clavicle is different compared to prior AP chest radiograph and is suspicious for nondisplaced fracture.  The remainder of the visualized osseous  "structures and soft tissues appear normal.  Impression: Nondisplaced fracture of the distal right clavicle is suspected.    Electronically signed by: Kaitlyn Antoine MD  Date:    02/28/2023  Time:    15:18      Labs, Imaging, EKG and Diagnostic results from 2/28/2023 were reviewed.    Medications:  Medication list was reviewed and changes noted under Assessment/Plan.  No current facility-administered medications on file prior to encounter.     Current Outpatient Medications on File Prior to Encounter   Medication Sig Dispense Refill    acetaminophen (TYLENOL) 650 MG TbSR Take 1 tablet (650 mg total) by mouth every 6 (six) hours as needed (pain). 40 tablet 0    amiodarone (PACERONE) 200 MG Tab Take 1 tablet (200 mg total) by mouth once daily. 90 tablet 3    atorvastatin (LIPITOR) 40 MG tablet Take 1 tablet (40 mg total) by mouth once daily. 90 tablet 3    BD ULTRA-FINE SHORT PEN NEEDLE 31 gauge x 5/16" Ndle 3 (three) times daily.      blood sugar diagnostic Strp 1 strip by Misc.(Non-Drug; Combo Route) route 2 (two) times a day. 200 strip 6    cetirizine (ZYRTEC) 10 MG tablet Take 1 tablet (10 mg total) by mouth every evening.  0    ciprofloxacin HCl (CIPRO) 500 MG tablet       docusate sodium (COLACE) 100 MG capsule Take 100 mg by mouth once daily at 6am.      gabapentin (NEURONTIN) 100 MG capsule Take 1 capsule (100 mg total) by mouth 2 (two) times daily. 60 capsule 11    glucose 4 GM chewable tablet Take 4 tablets (16 g total) by mouth as needed for Low blood sugar (50 - 70).  12    glucose 4 GM chewable tablet Take 6 tablets (24 g total) by mouth as needed for Low blood sugar (< 50).  12    HYDROcodone-acetaminophen (NORCO) 5-325 mg per tablet Take 1 tablet by mouth every 6 (six) hours as needed for Pain. 56 tablet 0    insulin aspart U-100 (NOVOLOG) 100 unit/mL (3 mL) InPn pen Inject 1-10 Units into the skin before meals and at bedtime as needed (Hyperglycemia). 30 mL 3    insulin aspart U-100 (NOVOLOG) 100 " "unit/mL (3 mL) InPn pen Inject 15 Units into the skin 3 (three) times daily with meals. (Patient taking differently: Inject 5 Units into the skin 3 (three) times daily with meals.) 162 mL 0    insulin detemir U-100 (LEVEMIR FLEXTOUCH) 100 unit/mL (3 mL) SubQ InPn pen Inject 6 Units into the skin 2 (two) times daily. 3.6 mL 0    insulin glargine,hum.rec.anlog (LANTUS SOLOSTAR U-100 INSULIN SUBQ) Inject 12 Units into the skin 2 (two) times a day.      insulin lispro 100 unit/mL injection Inject 4 Units into the skin.      JANUVIA 50 mg Tab Take 100 mg by mouth.      lacosamide (VIMPAT) 100 mg Tab Take 1 tablet (100 mg total) by mouth every 12 (twelve) hours. 60 tablet 11    lancets (ONETOUCH ULTRASOFT LANCETS) Misc 1 lancet by Misc.(Non-Drug; Combo Route) route 2 (two) times a day. 200 each 6    losartan (COZAAR) 25 MG tablet Take 25 mg by mouth once daily.      MAGNESIUM ORAL Take 400 mg by mouth once.      methocarbamoL (ROBAXIN) 500 MG Tab Take 500 mg by mouth daily as needed.      metoprolol succinate (TOPROL-XL) 50 MG 24 hr tablet Take 25 mg by mouth once daily.      nitroGLYCERIN (NITROSTAT) 0.4 MG SL tablet Place 1 tablet (0.4 mg total) under the tongue every 5 (five) minutes as needed for Chest pain. 25 tablet 11    ondansetron (ZOFRAN-ODT) 4 MG TbDL Take 4 mg by mouth every 6 (six) hours as needed.      pantoprazole (PROTONIX) 40 MG tablet Take 1 tablet (40 mg total) by mouth once daily. 30 tablet 11    pen needle, diabetic (PEN NEEDLE) 30 gauge x 5/16" Ndle 1 Units by Misc.(Non-Drug; Combo Route) route 3 (three) times daily. 100 each 3    polycarbophil (FIBERCON) 625 mg tablet Take 1 tablet by mouth once daily.       psyllium husk, with sugar, (METAMUCIL, WITH SUGAR,) 3.4 gram packet Take 1 packet by mouth 2 (two) times daily.      sertraline (ZOLOFT) 50 MG tablet Take 50 mg by mouth once daily.      sucralfate (CARAFATE) 1 gram tablet Take 1 g by mouth 3 (three) times daily before meals.      tamsulosin " (FLOMAX) 0.4 mg Cap Take 1 capsule (0.4 mg total) by mouth every evening.  0    vitamin D (VITAMIN D3) 1000 units Tab Take 1,000 Units by mouth once daily.      [DISCONTINUED] albuterol (PROVENTIL/VENTOLIN HFA) 90 mcg/actuation inhaler Inhale 2 puffs into the lungs every 4 (four) hours as needed for Wheezing (Pt states he will take it on his own. MDI placed by bedside.). Rescue      [DISCONTINUED] blood-glucose meter,continuous (DEXCOM G5 ) Misc 1 Device by Misc.(Non-Drug; Combo Route) route once daily at 6am. 1 each 0    [DISCONTINUED] blood-glucose transmitter (DEXCOM G5 TRANSMITTER) Stephanie 1 Device by Misc.(Non-Drug; Combo Route) route once daily at 6am. 1 each 0    [DISCONTINUED] diltiaZEM (CARDIZEM CD) 120 MG Cp24 Take 1 capsule (120 mg total) by mouth once daily. 90 capsule 3    [DISCONTINUED] metFORMIN (GLUCOPHAGE) 1000 MG tablet Take 1 tablet (1,000 mg total) by mouth 2 (two) times daily with meals. 60 tablet 3     Scheduled Medications:  acetaminophen, 1,000 mg, Oral, Q8H  amiodarone, 200 mg, Oral, Daily  aspirin, 81 mg, Oral, Daily  atorvastatin, 40 mg, Oral, Daily  cefTRIAXone (ROCEPHIN) IVPB, 1 g, Intravenous, Q24H  docusate sodium, 100 mg, Oral, Daily  insulin aspart U-100, 5 Units, Subcutaneous, TIDWM  [START ON 3/1/2023] insulin detemir U-100, 12 Units, Subcutaneous, Daily  metoprolol succinate, 25 mg, Oral, Daily  SITagliptin phosphate, 50 mg, Oral, Daily  sucralfate, 1 g, Oral, TID AC  tamsulosin, 0.4 mg, Oral, QHS      PRN: dextrose 10%, dextrose 10%, dextrose 10%, dextrose 10%, dextrose, dextrose, glucagon (human recombinant), glucose, insulin aspart U-100, sodium chloride 0.9%, DIPH,PERTUSS(ACELL),TET VACCINE (ADULT)(BOOSTRIX,ADACEL)  Infusions:   Estimated Creatinine Clearance: 63 mL/min (based on SCr of 1 mg/dL).    Assessment/Plan:      * UTI (urinary tract infection)    Patient has elevated white blood cell count on urinalysis.  Will continue Rocephin and follow up on urine  cultures    Constipation  2/28 CT abdomen pelvis - Markedly distended rectum containing a large stool ball with associated rectal wall thickening and mild adjacent fat stranding.  Findings are suggestive of fecal impaction with possible stercoral colitis. Significant volume retained stool throughout the remaining colon suggesting constipation.     resolved. had 4 BMs overnight    Anemia    Patient's with Normocytic anemia.. Hemoglobin stable. Etiology likely due to chronic disease .  Current CBC reviewed-  Recent Labs   Lab 02/27/23  1827 02/28/23  0600   HGB 11.8* 10.5*       Monitor CBC and transfuse if H/H drops below 7/21.       Closed nondisplaced fracture of acromial end of right clavicle    Patient also complains of having tenderness on moving his right arm above his shoulder.  X ray right shoulder -  minimal irregularity of the superior margin of the distal right clavicle which could represent a small incomplete fracture of the distal right clavicle.   2/28    s/p orthopedic surgery eval -right distal third clavicle fracture, closed, intact neurovascularity. Sling for comfort RUE. NWB RUE, begin gentle ROM in 2 weeks. PT/OT Consulted       Frequent falls  PT/OT evaluation      Dyslipidemia associated with type 2 diabetes mellitus  Continue atorvastatin    Encephalopathy, metabolic  secondary to above . AAOX2. UC pending      Coronary artery disease    Patient has a previous history of MI. supposed to be on aspirin, Plavix, statins.  He is currently only on statins and aspirin Plavix were discontinued on his prior admission.  Will start him back on aspirin for now and continue statin    Paroxysmal atrial fibrillation  Patient has history of paroxysmal atrial fibrillation and was on rate control with Toprol, rhythm control with amiodarone and anticoagulation with Eliquis.  On prior admission when he presented with lumbar fracture his aspirin/Plavix/Eliquis was held.  Given his history of multiple falls, shared  decision making needs to be addressed with the family regarding continuation of Eliquis for his anticoagulation    Type 2 diabetes mellitus with hyperglycemia, with long-term current use of insulin  Patient's last A1c was 10.6.  Will keep him on sliding scale and a.c.  2/28  Patient's FSGs are not controlled on current hypoglycemics.   Last A1c reviewed-   Lab Results   Component Value Date    HGBA1C 8.9 (H) 02/28/2023    HGBA1C 10.6 (H) 10/05/2022    HGBA1C 9.7 (H) 04/05/2022     Will hold PO hypoglycemics and will start correctional scale insulin  Most recent fingerstick glucose reviewed-   Recent Labs   Lab 02/28/23  0418 02/28/23  0821 02/28/23  1200 02/28/23  1658   POCTGLUCOSE 335* 307* 202* 88     currently on   Antihyperglycemics (From admission, onward)      Start     Stop Route Frequency Ordered    03/01/23 0900  insulin detemir U-100 pen 12 Units         -- SubQ Daily 02/28/23 1118    02/28/23 1403  insulin aspart U-100 pen 0-5 Units         -- SubQ Before meals & nightly PRN 02/28/23 1303    02/28/23 1315  SITagliptin phosphate tablet 50 mg         -- Oral Daily 02/28/23 1311    02/28/23 1130  insulin aspart U-100 pen 5 Units         -- SubQ 3 times daily with meals 02/28/23 1118        2/28 Glucose in 300s.   A1c was 10.6. endocrine consulted      Essential hypertension    Chronic, controlled.  Latest blood pressure and vitals reviewed-   Temp:  [97.8 °F (36.6 °C)-98.1 °F (36.7 °C)]   Pulse:  [61-72]   Resp:  [11-20]   BP: (129-172)/(69-80)   SpO2:  [93 %-98 %] .   Home meds for hypertension were reviewed  PRN meds if BP> 180/110 mm HG  Controlled on Metoprolol      VTE Risk Mitigation (From admission, onward)           Ordered     IP VTE HIGH RISK PATIENT  Once         02/28/23 0058     Place sequential compression device  Until discontinued         02/28/23 0058                    Discharge Planning   ALLIE:      Code Status: DNR   Is the patient medically ready for discharge?: No    Reason for patient  still in hospital (select all that apply): Treatment                     Sudhakar Kan MD  Department of Hospital Medicine   [facility] - Intensive Care ([place])

## 2023-02-28 NOTE — ASSESSMENT & PLAN NOTE
Endocrinology consulted for BG management.   BG goal 140-180    WBD 0.4 units/kg/day  - Resume Januvia 100 mg  - Levemir (Insulin Detemir) 12 units daily  - Novolog (Insulin Aspart) 5 units TIDWM and prn for BG excursions LDC SSI (150/50)  - BG checks AC/HS  - Hypoglycemia protocol in place  - If blood glucose greater than 300, please ask patient not to eat food or drink anything other than water until correctional insulin has brought it back below 250    ** Please notify Endocrine for any change and/or advance in diet**  ** Please call Endocrine for any BG related issues **    Discharge Planning:   TBD. Please notify endocrinology prior to discharge.

## 2023-02-28 NOTE — ASSESSMENT & PLAN NOTE
Patient's last A1c was 10.6.  Will keep him on sliding scale and a.c.  2/28  Patient's FSGs are not controlled on current hypoglycemics.   Last A1c reviewed-   Lab Results   Component Value Date    HGBA1C 8.9 (H) 02/28/2023    HGBA1C 10.6 (H) 10/05/2022    HGBA1C 9.7 (H) 04/05/2022     Will hold PO hypoglycemics and will start correctional scale insulin  Most recent fingerstick glucose reviewed-   Recent Labs   Lab 02/28/23  0418 02/28/23  0821 02/28/23  1200 02/28/23  1658   POCTGLUCOSE 335* 307* 202* 88     currently on   Antihyperglycemics (From admission, onward)    Start     Stop Route Frequency Ordered    03/01/23 0900  insulin detemir U-100 pen 12 Units         -- SubQ Daily 02/28/23 1118    02/28/23 1403  insulin aspart U-100 pen 0-5 Units         -- SubQ Before meals & nightly PRN 02/28/23 1303    02/28/23 1315  SITagliptin phosphate tablet 50 mg         -- Oral Daily 02/28/23 1311    02/28/23 1130  insulin aspart U-100 pen 5 Units         -- SubQ 3 times daily with meals 02/28/23 1118      2/28 Glucose in 300s.   A1c was 10.6. endocrine consulted

## 2023-02-28 NOTE — ED PROVIDER NOTES
Encounter Date: 2/27/2023       History     Chief Complaint   Patient presents with    Altered Mental Status     Nurses report pt. Has been hallucinating and speaking to family members that are not there. Pt. Denies. EMS reports no hallucinations.  Pt. Reporting abd pain. Has sacral wound. From [facility]  Patient is a 76 yo male with history of DM, HTN, pA-fib, HF, CAD who presents from a nursing home for altered mental status. History obtained from the patient as well as EMS and NH staff. Per NH staff, patient has been altered over the last two days. He has been hallucinating and speaking to family members who are not in the room. He has been confused. He also had a few episodes of vomiting earlier today. Emesis was reported to be NBNB. They also report that the patient fell about 5 days ago. He was not evaluated by a doctor after the fall. He has been complaining of some right shoulder pain since that time and would like him evaluated for this. The patient also is able to tell me he fell recently but does not remember when. He is reporting mild shoulder pain. He does also tell me that he had vomiting and nausea earlier as well as some lower abdominal pain. He currently has no abdominal pain. He denies cp, sob, fever.     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    Hyperlipidemia     Hypertension     Kidney stone     Migraine headache     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 08/02/2012    Paroxysmal atrial fibrillation     Pressure sore     STEMI  involving right coronary artery 2022    Type II or unspecified type diabetes mellitus with neurological manifestations, uncontrolled(250.62) 2013    Urinary tract infection        Family History   Problem Relation Age of Onset    Diabetes Father     Prostate cancer Neg Hx     Kidney disease Neg Hx      Social History     Tobacco Use    Smoking status: Former     Packs/day: 1.50     Years: 25.00     Pack years: 37.50     Types: Cigarettes     Quit date: 1983     Years since quittin.1    Smokeless tobacco: Never   Substance Use Topics    Alcohol use: No    Drug use: No     Review of Systems  Full ROS limited due to confusion.     Physical Exam     Initial Vitals [23 1504]   BP Pulse Resp Temp SpO2   (!) 148/74 66 18 97.8 °F (36.6 °C) 97 %      MAP       --         Physical Exam  Constitutional: No acute distress, non-toxic appearing  HENT: Normocephalic, atraumatic  Spine: No C/T/L spine TTP, no step offs or deformities  Respiratory: Non-labored, lungs clear  Cardiovascular: Well perfused, RRR  Gastrointestinal: Soft, non-tender, non-distended  Genitourinary: Penis uncircumsised, testicles descended bilaterally, no scrotal swelling, scrotal skin appears excoriated, cremasteric reflexes intact bilaterally, no crepitus or wounds in the perineum  Integumentary: Warm and dry, skin tear to RUE with bleeding controlled, small sacral wound with stage II breakdown present  Musculoskeletal: No deformity, TTP over the right clavicle and anterior shoulder with old bruising present, no wrist or hand TTP, compartment soft, N/V intact distally  Neurological: Awake and alert, oriented to person only, follows commands with normal motor and sensation to light touch in all extremities, CN II-VII grossly intact  Psychiatric: Cooperative     ED Course   Procedures  Labs Reviewed   CBC W/ AUTO DIFFERENTIAL - Abnormal; Notable for the following components:       Result Value    RBC 4.10 (*)     Hemoglobin 11.8  (*)     Hematocrit 37.0 (*)     MCHC 31.9 (*)     Lymph % 17.4 (*)     All other components within normal limits   COMPREHENSIVE METABOLIC PANEL - Abnormal; Notable for the following components:    Glucose 220 (*)     Albumin 3.1 (*)     All other components within normal limits   URINALYSIS, REFLEX TO URINE CULTURE - Abnormal; Notable for the following components:    Appearance, UA Hazy (*)     Protein, UA 1+ (*)     Ketones, UA Trace (*)     Occult Blood UA 3+ (*)     Leukocytes, UA 3+ (*)     All other components within normal limits    Narrative:     Specimen Source->Urine   URINALYSIS MICROSCOPIC - Abnormal; Notable for the following components:    RBC, UA >100 (*)     WBC, UA >100 (*)     WBC Clumps, UA Moderate (*)     Bacteria Moderate (*)     All other components within normal limits    Narrative:     Specimen Source->Urine   CULTURE, URINE   HEPATITIS C ANTIBODY    Narrative:     Release to patient->Immediate   LIPASE   LACTIC ACID, PLASMA   TROPONIN I   LACTIC ACID, PLASMA   BASIC METABOLIC PANEL   CBC W/ AUTO DIFFERENTIAL   HEMOGLOBIN A1C   POCT GLUCOSE MONITORING CONTINUOUS          Imaging Results              CT Abdomen Pelvis  Without Contrast (Final result)  Result time 02/27/23 22:09:06      Final result by Rafi Rodriguez MD (02/27/23 22:09:06)                   Impression:      1.  Markedly distended rectum containing a large stool ball with associated rectal wall thickening and mild adjacent fat stranding.  Findings are suggestive of fecal impaction with possible stercoral colitis.    2.  Significant volume retained stool throughout the remaining colon suggesting constipation.    3.  New left-sided pleural effusion with adjacent atelectasis and/or consolidation.    4.  Nonspecific bilateral perinephric edema, left greater than right.  No evidence of hydronephrosis.  Correlation with urinalysis to infectious process advised.    5.  Additional stable findings as above.      Electronically  signed by: Rafi Rodriguez MD  Date:    02/27/2023  Time:    22:09               Narrative:    EXAMINATION:  CT ABDOMEN PELVIS WITHOUT CONTRAST    CLINICAL HISTORY:  Nausea/vomiting;    TECHNIQUE:  Low dose axial images, sagittal and coronal reformations were obtained from the lung bases to the pubic symphysis without IV contrast.  Oral contrast was not administered.    COMPARISON:  CT 10/05/2022    FINDINGS:  Please note image quality is degraded by streak artifact throughout the abdomen relating to the patient's upper extremities.    There are advanced emphysematous change of the visualized lung bases.  There is a left-sided pleural effusion with adjacent atelectasis and/or consolidation.  There is no significant pericardial effusion.    The liver is unremarkable in appearance on this limited non-contrast examination.  There is possible biliary sludge within the gallbladder lumen.  No significant gallbladder wall thickening or pericholecystic inflammatory change appreciated.  There is no intra-or extrahepatic biliary ductal dilatation.    The stomach, spleen, pancreas, and adrenal glands appear within normal limits for noncontrast technique.    The kidneys are normal in size and location.  There are punctate bilateral nonobstructing renal calculi.  There is no hydronephrosis or definite obstructing ureteral calculus.  There are bilateral well-circumscribed renal hypodensities, the largest of which appear compatible with simple cysts, similar to prior examination.  There is nonspecific bilateral perinephric edema, left more so than right.  Urinary bladder demonstrates mild posterior wall thickening likely reactive secondary to adjacent distended rectum, similar to prior examination.  Prostate is unremarkable.    The abdominal aorta is normal in course and caliber with moderate atherosclerotic calcification along its course.  There is no retroperitoneal hematoma.    There is no evidence to suggest small bowel  obstruction.  There is a significant volume of retained stool throughout the colon suggesting constipation.  The rectum is significantly distended measuring 10.0 cm in AP diameter with large retained stool ball.  There is circumferential rectal wall thickening as well as mild adjacent perirectal fat stranding which may relate to stercoral colitis.  The appendix appears within normal limits.  There is no ascites, portal venous gas, or free intraperitoneal air identified.  There is no new bulky lymph node enlargement identified.    Osseous structures demonstrate a L1 compression fracture status post prior vertebroplasty.  There is a stable mild superior endplate deformity the T12 vertebral body.  There are degenerative change of the remaining visualized thoracolumbar spine.  The extraperitoneal soft tissues are unremarkable.                                       CT Cervical Spine Without Contrast (Final result)  Result time 02/27/23 22:02:50      Final result by Sourav Mathis MD (02/27/23 22:02:50)                   Impression:      1. No acute abnormality.  2. Multilevel chronic degenerative changes.  3. Diffuse wall thickening of the upper esophagus, similar to the prior study could represent esophagitis.  Esophagram may be helpful.      Electronically signed by: Sourav Mathis  Date:    02/27/2023  Time:    22:02               Narrative:    EXAMINATION:  CT CERVICAL SPINE WITHOUT CONTRAST    CLINICAL HISTORY:  Neck trauma (Age >= 65y);    TECHNIQUE:  Low dose axial CT images through the cervical spine, with sagittal and coronal reformations.  Contrast was not administered.    COMPARISON:  03/25/2022    FINDINGS:  No acute fractures of the cervical spine.  Minimal grade 1 spondylolisthesis of C2 on C3, C3 on C4 and C4 on C5.    Moderate facet arthropathy bilaterally at C2-3, C3-4 and C4-5.    Moderate disc space narrowing at C6-7 mild narrowing at C5-6.    Central canal is adequately maintained.    Severe  foraminal narrowing at C3-4 on the left, C5-6 on the right, C6-7 on the left    Limited evaluation of the intraspinal contents demonstrates no hematoma or mass.Paraspinal soft tissues exhibit no acute abnormalities.    There is diffuse wall thickening of the upper esophagus could be associated with esophagitis.  This is similar to the prior study.  Esophagram may be helpful.                                       CT Head Without Contrast (Final result)  Result time 02/27/23 21:55:11      Final result by Sourav Mathis MD (02/27/23 21:55:11)                   Impression:      1. No acute intracranial process.  2. Involutional changes with chronic microvascular ischemic changes.  Remote right frontal cortical infarct with residual encephalomalacia.  Remote left cerebellar lacunar infarct also.      Electronically signed by: Sourav Mathis  Date:    02/27/2023  Time:    21:55               Narrative:    EXAMINATION:  CT HEAD WITHOUT CONTRAST    CLINICAL HISTORY:  Head trauma, minor (Age >= 65y);    TECHNIQUE:  Low dose axial CT images obtained throughout the head without intravenous contrast. Sagittal and coronal reconstructions were performed.    COMPARISON:  10/05/2022    FINDINGS:  Intracranial compartment:    No midline shift or hydrocephalus.  No extra-axial blood or fluid collections.    Encephalomalacia of the right frontal cortex consistent with remote infarction.  No significant change.    Moderate involutional changes and chronic microvascular ischemic changes in the periventricular white matter.    Remote lacunar infarction of the left lateral cerebellum is unchanged.  No parenchymal mass, hemorrhage, edema or major vascular distribution infarct.    Skull/extracranial contents (limited evaluation): No fracture. Mastoid air cells and paranasal sinuses are essentially clear.    Motion artifact.                                       X-Ray Humerus 2 View Right (Final result)  Result time 02/27/23 21:00:22    Procedure changed from X-Ray Humerus 2 View Left     Final result by Camacho Leon MD (02/27/23 21:00:22)                   Impression:      As above.      Electronically signed by: Camacho Leon MD  Date:    02/27/2023  Time:    21:00               Narrative:    EXAMINATION:  XR HUMERUS 2 VIEW RIGHT    CLINICAL HISTORY:  FALL;  Unspecified fall, initial encounter    TECHNIQUE:  AP and lateral views right humerus    COMPARISON:  Chest radiograph, right forearm, right elbow and right shoulder series same day    FINDINGS:  Majority of the humeral head and upper shoulder are excluded from field of view limiting evaluation of the proximal humerus and shoulder.  Refer to right shoulder series and chest radiograph same date for further details.    Generalized osteopenia.  Remainder of the humerus is well aligned and intact.  No dislocation or destructive osseous process of the imaged portions of the humerus and elbow.  No subcutaneous emphysema or radiodense retained foreign body.                                       X-Ray Forearm Right (Final result)  Result time 02/27/23 19:33:33   Procedure changed from X-Ray Forearm Left     Final result by Sourav Mathis MD (02/27/23 19:33:33)                   Impression:      No acute radiographic abnormality.      Electronically signed by: Sourav Mathis  Date:    02/27/2023  Time:    19:33               Narrative:    EXAMINATION:  XR FOREARM RIGHT    CLINICAL HISTORY:  FALL;Unspecified fall, initial encounter    TECHNIQUE:  AP and lateral views of the right forearm were performed.    COMPARISON:  02/27/2023    FINDINGS:  Alignment is satisfactory.  No acute fracture, subluxation or dislocation.  Degenerative changes of the wrist with narrowing of the radiocarpal joint.  No mass or foreign body.                                       X-Ray Elbow Complete Right (Final result)  Result time 02/27/23 19:33:44   Procedure changed from X-Ray Elbow Complete Left     Final result by  Sourav Mathis MD (02/27/23 19:33:44)                   Impression:      No acute radiographic abnormality.      Electronically signed by: Sourva Mathis  Date:    02/27/2023  Time:    19:33               Narrative:    EXAMINATION:  XR ELBOW COMPLETE 3 VIEW RIGHT    CLINICAL HISTORY:  FALL;. Unspecified fall, initial encounter    TECHNIQUE:  AP, lateral, and oblique views of the right elbow were performed.    COMPARISON:  None    FINDINGS:  No acute fracture, subluxation or dislocation.  No mass or foreign body no significant joint effusion or hemarthrosis.                                       X-Ray Chest AP Portable (Final result)  Result time 02/27/23 19:33:54      Final result by Sourav Mathis MD (02/27/23 19:33:54)                   Impression:      No acute radiographic abnormality.      Electronically signed by: Sourav Mathis  Date:    02/27/2023  Time:    19:33               Narrative:    EXAMINATION:  XR CHEST AP PORTABLE    CLINICAL HISTORY:  ams;    TECHNIQUE:  Single frontal view of the chest was performed.    COMPARISON:  10/24/2022    FINDINGS:  Cardiac silhouette is within normal limits.    A probable emphysematous changes of the lungs with mild interstitial thickening.  No large effusion.  No evidence of pneumothorax.  No mass, consolidation or focal infiltrate.  No edema is detected.    No acute rib fractures are detected.                                       X-Ray Shoulder Trauma 3 view Right (Final result)  Result time 02/27/23 19:34:28   Procedure changed from X-Ray Shoulder Trauma Left     Final result by Sourav Mathis MD (02/27/23 19:34:28)                   Impression:      There is minimal irregularity of the superior margin of the distal right clavicle which could represent a small incomplete fracture of the distal right clavicle. No prior studies for comparison. Recommend correlation with physical exam.      Electronically signed by: Sourav  Leeann  Date:    02/27/2023  Time:    19:34               Narrative:    EXAMINATION:  XR SHOULDER TRAUMA 3 VIEW RIGHT    CLINICAL HISTORY:  fall;Unspecified fall, initial encounter    TECHNIQUE:  Three or four views of the right shoulder were performed.    COMPARISON:  None    FINDINGS:  Mild degenerative changes of the AC joint.  There is minimal irregularity of the superior margin of the distal right clavicle could represent a small incomplete fracture of the distal right clavicle.  No prior studies for comparison.  Recommend correlation with physical exam.    The AP view is suboptimal.    The humeral head is normally positioned.  Possible calcific tendinosis adjacent to the humeral head.  Right hemithorax is clear.                                       Medications   Tdap (BOOSTRIX) vaccine injection 0.5 mL (has no administration in time range)   amiodarone tablet 200 mg (has no administration in time range)   atorvastatin tablet 40 mg (has no administration in time range)   docusate sodium capsule 100 mg (has no administration in time range)   metoprolol succinate (TOPROL-XL) 24 hr tablet 25 mg (has no administration in time range)   sucralfate tablet 1 g (has no administration in time range)   tamsulosin 24 hr capsule 0.4 mg (0.4 mg Oral Given 2/28/23 0327)   sodium chloride 0.9% flush 10 mL (has no administration in time range)   cefTRIAXone (ROCEPHIN) 1 g in dextrose 5 % in water (D5W) 5 % 50 mL IVPB (MB+) (has no administration in time range)   glucose chewable tablet 16 g (has no administration in time range)   dextrose 10% bolus 125 mL 125 mL (has no administration in time range)   dextrose 10% bolus 250 mL 250 mL (has no administration in time range)   glucagon (human recombinant) injection 1 mg (has no administration in time range)   dextrose 10% bolus 125 mL 125 mL (has no administration in time range)   dextrose 10% bolus 250 mL 250 mL (has no administration in time range)   dextrose 40 % gel 15,000  mg (has no administration in time range)   dextrose 40 % gel 30,000 mg (has no administration in time range)   insulin aspart U-100 pen 1-10 Units (has no administration in time range)   aspirin chewable tablet 81 mg (has no administration in time range)   ondansetron injection 4 mg (4 mg Intravenous Given 2/27/23 1829)   cefTRIAXone (ROCEPHIN) 1 g in dextrose 5 % in water (D5W) 5 % 50 mL IVPB (MB+) (0 g Intravenous Stopped 2/27/23 2115)     Medical Decision Making:   History:   I obtained history from: someone other than patient and EMS provider.       <> Summary of History: NH staff, patient, EMS  Old Records Summarized: records from clinic visits and records from previous admission(s).  Clinical Tests:   Lab Tests: Ordered and Reviewed  Radiological Study: Ordered and Reviewed  Medical Tests: Ordered and Reviewed  ED Management:  Here for AMS. Patient had a fall a few days ago. Reporting right shoulder pain. Now altered. Had an episode of vomiting today and was reporting some abdominal pain. Patient denies current abdominal pain and denies cp, sob. Abdominal and  exam reassuring. Labs and imaging ordered. Will closely monitor.     Labs and imaging reviewed. Imaging with questionable clavicle fracture which clinically fits with the patient's pain on re-evaluation. Sling ordered for comfort. UA consistent with infection. CT shows findings concerning for pyelonephritis. Antibiotics ordered. He also has significant stool burden. I went to perform NINFA and patient had just had a soft, moderate sized, nonbloody BM. Rectal performed with normal rectal tone and soft stool in the rectal vault. No hard stool to disimpact. Patient admitted to .   Other:   I have discussed this case with another health care provider.       <> Summary of the Discussion:            ED Course as of 02/28/23 0421 Mon Feb 27, 2023 1823 EKG independently interpreted by me shows normal sinus rhythm, rate 64, no STEMI, similar to prior.  [NN]   2132 There is minimal irregularity of the superior margin of the distal right clavicle which could represent a small incomplete fracture of the distal right clavicle. No prior studies for comparison [NN]   2352 Lactate, Avni: 1.2 [NN]   Tue Feb 28, 2023   0018 Markedly distended rectum containing a large stool ball with associated rectal wall thickening and mild adjacent fat stranding.  Findings are suggestive of fecal impaction with possible stercoral colitis. [NN]   0018 Patient has normal lactate and repeat abdominal exam is reassuring. [NN]      ED Course User Index  [NN] Lisa Rao MD                 Clinical Impression:   Final diagnoses:  [R41.82] AMS (altered mental status)  [W19.XXXA] Fall  [N39.0] UTI (urinary tract infection)        ED Disposition Condition    Observation                 Lisa Rao MD  02/28/23 0424

## 2023-02-28 NOTE — SUBJECTIVE & OBJECTIVE
Interval HPI:   Overnight events: Remains in 32306/95080 A. BG above goal with prn correction scale. IV antibiotics.   Eating:  Diet diabetic [facility]; 2000 Calorie   50%  Nausea: No  Hypoglycemia and intervention: No  Fever: No  TPN and/or TF: No    PMH, PSH, FH, SH reviewed     ROS:  Review of Systems  Constitutional: Negative for weight changes.  Eyes: + for visual disturbance.  Respiratory: Negative for cough.   Cardiovascular: Negative for chest pain.  Gastrointestinal: Negative for nausea.  Endocrine: + for polyuria, polydipsia.  Musculoskeletal: Negative for back pain.  Skin: Negative for rash.  Neurological: Negative for syncope.  Psychiatric/Behavioral: Negative for depression.      Current Medications and/or Treatments Impacting Glycemic Control  Immunotherapy:    Immunosuppressants       None          Steroids:   Hormones (From admission, onward)      None          Pressors:    Autonomic Drugs (From admission, onward)      None          Hyperglycemia/Diabetes Medications:   Antihyperglycemics (From admission, onward)      Start     Stop Route Frequency Ordered    02/28/23 1130  insulin aspart U-100 pen 6 Units         -- SubQ 3 times daily with meals 02/28/23 0912    02/28/23 1015  insulin detemir U-100 pen 18 Units         -- SubQ Daily 02/28/23 0912    02/28/23 0229  insulin aspart U-100 pen 1-10 Units         -- SubQ Before meals & nightly PRN 02/28/23 0130             PHYSICAL EXAMINATION:  Vitals:    02/28/23 0815   BP: 129/69   Pulse: 69   Resp: 11   Temp: 98 °F (36.7 °C)     Body mass index is 21.06 kg/m².    Physical Exam  Constitutional: Well developed, well nourished, NAD.  ENT: External ears no masses with nose patent; normal hearing.  Neck: Supple; trachea midline.  Cardiovascular: Normal heart sounds, no LE edema. DP +2 bilaterally.  Lungs: Normal effort; lungs anterior bilaterally clear to auscultation.  Abdomen: Soft, no masses, no hernias.  MS: No clubbing or cyanosis of nails  noted;  unable to assess gait.  Skin: No rashes, lesions, or ulcers; no nodules. Injection sites are ok. No lipo hypertropthy or atrophy.  Psychiatric: Good judgement and insight; normal mood and affect.  Neurological: Cranial nerves are grossly intact.   Foot: nails in good condition, no amputations noted.

## 2023-02-28 NOTE — ASSESSMENT & PLAN NOTE
Patient has history of paroxysmal atrial fibrillation and was on rate control with Toprol, rhythm control with amiodarone and anticoagulation with Eliquis.  On prior admission when he presented with lumbar fracture his aspirin/Plavix/Eliquis was held.  Given his history of multiple falls, shared decision making needs to be addressed with the family regarding continuation of Eliquis for his anticoagulation

## 2023-02-28 NOTE — ED NOTES
Attempted to notify pt's son, Ed, regarding admission. No answer and was unable to leave message due to mailbox being full.

## 2023-02-28 NOTE — CONSULTS
Nutrition-Related Diabetes Education      Time Spent: 10 minutes     Learners: Patient     Current HbA1c: 10.6 (10/2022)    Is patient aware of their A1c and their goal A1c? yes    Nutrition Education with handouts: MyPlate, CHO counting     Comments: Pt reports he is familiar with the diabetic diet. Provided and explained handout detailing sources of carbohydrates, appropriate serving sizes, and the plate method for meal planning. Pt voiced understanding. All questions and concerns answered.    Reports good appetite PTA/currently & UBW of 160#. No indicators of malnutrition noted.    Barriers to Learning: Non-compliance     Follow up: Yes    Please consult as needed.    Thank you!  MS Baylee, RD, LDN

## 2023-02-28 NOTE — CONSULTS
[facility] ([place] [address])  Orthopedics  Consult Note    Patient Name: Kamar Muñoz  MRN: [id number]  Admission Date: 2/27/2023  Hospital Length of Stay: 0 days  Attending Provider: Sudhakar Kan MD  Primary Care Provider: Basim Guerrero MD    Inpatient consult to Orthopedics  Consult performed by: Raul Ralph MD  Consult ordered by: Sudhakar Kan MD        Subjective:     Principal Problem:UTI (urinary tract infection)    Chief Complaint:   Chief Complaint   Patient presents with    Altered Mental Status     Nurses report pt. Has been hallucinating and speaking to family members that are not there. Pt. Denies. EMS reports no hallucinations.  Pt. Reporting abd pain. Has sacral wound. From [facility]        HPI: Kamar Muñoz is a 77 y.o. male with PMH significant for type 2 diabetes mellitus, hypertension, paroxysmal atrial fibrillation,HFmEF, and MI who is admitted after a mechanical fall from his nursing home. Patient reports head trauma and LOC. Patient reports numbness and tingling to the finger tips of his right hand for over 6 months. Denies any other musculoskeletal pain or injuries. No known history of prior right shoulder injury or surgery.  Walks with the use of a walker at baseline. Does not take any anticoagulation at baseline.   They deny IV drug use.  They deny tobacco use.   They deny alcohol use.   They deny immunosuppressant medications.  They deny chemotherapy.  They deny radiation therapy.   He is right hand dominant. He lives at a nursing home.         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    Hyperlipidemia     Hypertension     Kidney stone      Migraine headache     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 08/02/2012    Paroxysmal atrial fibrillation     Pressure sore     STEMI involving right coronary artery 01/09/2022    Type II or unspecified type diabetes mellitus with neurological manifestations, uncontrolled(250.62) 03/08/2013    Urinary tract infection        Past Surgical History:   Procedure Laterality Date    BACK SURGERY      CATARACT EXTRACTION W/  INTRAOCULAR LENS IMPLANT Right     Per Dr Romero note 11/2018    COLONOSCOPY N/A 1/28/2019    Procedure: COLONOSCOPY Suprep;  Surgeon: Anh Johnson MD;  Location: [facility] ENDO;  Service: Endoscopy;  Laterality: N/A;    ESOPHAGOGASTRODUODENOSCOPY N/A 9/15/2022    Procedure: EGD (ESOPHAGOGASTRODUODENOSCOPY);  Surgeon: Dashawn Evans MD;  Location: [facility] ENDO;  Service: Endoscopy;  Laterality: N/A;    EYE SURGERY      HERNIA REPAIR      KYPHOPLASTY, SPINE, LUMBAR N/A 2/1/2023    Procedure: KYPHOPLASTY, SPINE, LUMBAR;  Surgeon: Kimberly Spicer MD;  Location: [facility] OR;  Service: Neurosurgery;  Laterality: N/A;  Ane: Gen  Blood: Type & Hold  Pos: Prone  Rad: C-arm x 2  Spec Equip: Dep[employer] - Tray Cortez    LEFT HEART CATHETERIZATION Left 1/9/2022    Procedure: CATHETERIZATION, HEART, LEFT;  Surgeon: Will Hurst III, MD;  Location: [facility] CATH LAB/EP;  Service: Cardiology;  Laterality: Left;    renal stones      SHOULDER OPEN ROTATOR CUFF REPAIR         Review of patient's allergies indicates:   Allergen Reactions    Iodine      Other reaction(s): swelling  Other reaction(s): Itching  Other reaction(s): Rash / IVP       Current Facility-Administered Medications   Medication    acetaminophen tablet 1,000 mg    amiodarone tablet 200 mg    aspirin chewable tablet 81 mg    atorvastatin tablet 40 mg    cefTRIAXone (ROCEPHIN) 1 g in dextrose 5 % in water (D5W) 5 % 50 mL IVPB (MB+)    dextrose 10% bolus 125 mL 125 mL    dextrose 10% bolus 125 mL 125 mL    dextrose 10%  "bolus 250 mL 250 mL    dextrose 10% bolus 250 mL 250 mL    dextrose 40 % gel 15,000 mg    dextrose 40 % gel 30,000 mg    docusate sodium capsule 100 mg    glucagon (human recombinant) injection 1 mg    glucose chewable tablet 16 g    insulin aspart U-100 pen 0-5 Units    insulin aspart U-100 pen 5 Units    [START ON 3/1/2023] insulin detemir U-100 pen 12 Units    metoprolol succinate (TOPROL-XL) 24 hr tablet 25 mg    SITagliptin phosphate tablet 50 mg    sodium chloride 0.9% flush 10 mL    sucralfate tablet 1 g    tamsulosin 24 hr capsule 0.4 mg    Tdap (BOOSTRIX) vaccine injection 0.5 mL     Family History       Problem Relation (Age of Onset)    Diabetes Father          Tobacco Use    Smoking status: Former     Packs/day: 1.50     Years: 25.00     Pack years: 37.50     Types: Cigarettes     Quit date: 1983     Years since quittin.1    Smokeless tobacco: Never   Substance and Sexual Activity    Alcohol use: No    Drug use: No    Sexual activity: Yes     Partners: Female     ROS  Constitutional: negative for fevers or chills  Eyes: negative visual changes or eye discharge  ENT: negative for ear pain or sore throat  Respiratory: negative for shortness of breath or cough  Cardiovascular: negative for chest pain or palpitations  Gastrointestinal: negative for abdominal pain, nausea, or vomiting  Genitourinary: negative for dysuria and flank pain  Neurological: negative for headaches or dizziness  Musculoskeletal: positive for right shoulder pain    Objective:     Vital Signs (Most Recent):  Temp: 98 °F (36.7 °C) (23)  Pulse: 69 (23)  Resp: 11 (23)  BP: 129/69 (23)  SpO2: (!) 93 % (23)   Vital Signs (24h Range):  Temp:  [97.9 °F (36.6 °C)-98.1 °F (36.7 °C)] 98 °F (36.7 °C)  Pulse:  [61-72] 69  Resp:  [11-20] 11  SpO2:  [93 %-98 %] 93 %  BP: (129-172)/(69-80) 129/69     Weight: 74.4 kg (164 lb)  Height: 6' 2" (188 cm)  Body mass index is " "21.06 kg/m².      Intake/Output Summary (Last 24 hours) at 2/28/2023 1648  Last data filed at 2/28/2023 0616  Gross per 24 hour   Intake 50 ml   Output 400 ml   Net -350 ml       Ortho/SPM Exam  Gen:  No acute distress, well-developed, well nourished.  CV:  Peripherally well-perfused. 2+ radial pulses, symmetric.  Respiratory:  Normal respiratory effort. No accessory muscle use.   Head/Neck:  Normocephalic.  Atraumatic. Sclera anicteric. TM. Neck supple.  Neuro: CN 2-12 grossly intact. No FND.   Abdomen: Soft, NTND.      MSK:  Right Upper extremity  Inspection  - Skin intact throughout, no open wounds  - No swelling  - No ecchymosis, erythema, or signs of cellulitis  Palpation  - TTP to distal clavicle  Range of motion  - AROM and PROM of the shoulder, elbow, wrist, and hand intact, decreased AROM of the shoulder secondary to pain, mild pain with PROM of the shoulder  Stability  - No evidence of joint dislocation or abnormal laxity   Neurovascular  - AIN/PIN/Radial/Median/Ulnar Nerves assessed in isolation without deficit  - Able to give thumbs up, make "OK" sign, cross IF/LF, abduct/adduct fingers, make fist  - SILT throughout  - Compartments soft  - Radial artery palpated      Left Upper extremity  Inspection  - Skin intact throughout, no open wounds  - No swelling  - No ecchymosis, erythema, or signs of cellulitis  Palpation  - NonTTP throughout, no palpable abnormality   Range of motion  - AROM and PROM of the shoulder, elbow, wrist, and hand intact  Stability  - No evidence of joint dislocation or abnormal laxity   Neurovascular  - AIN/PIN/Radial/Median/Ulnar Nerves assessed in isolation without deficit  - Able to give thumbs up, make "OK" sign, cross IF/LF, abduct/adduct fingers, make fist  - SILT throughout  - Compartments soft  - Radial artery palpated      Right Lower Extremity  Inspection  - Skin intact throughout, no open wounds  - No swelling  - No ecchymosis, erythema, or signs of " cellulitis  Palpation  - NonTTP throughout, no palpable abnormality   Range of motion  - AROM and PROM of the hip, knee, ankle, and foot intact  Stability  - No evidence of joint dislocation or abnormal laxity  Neurovascular  - TA/EHL/Gastroc/FHL assessed in isolation without deficit  - SILT throughout  - Compartments soft  - DP palpated   - Negative Log roll  - [facility]      Left Lower Extremity  Inspection  - Skin intact throughout, no open wounds  - No swelling  - No ecchymosis, erythema, or signs of cellulitis  Palpation  - NonTTP throughout, no palpable abnormality   Range of motion  - AROM and PROM of the hip, knee, ankle, and foot intact  Stability  - No evidence of joint dislocation or abnormal laxity  Neurovascular  - TA/EHL/Gastroc/FHL assessed in isolation without deficit  - SILT throughout  - Compartments soft  - DP palpated   - Negative Log roll  - Negative [facility]    Spine/pelvis/axial body:  No pain with compression of pelvis  No chest wall or abdominal tenderness    Significant Labs: CBC:   Recent Labs   Lab 02/27/23  1827 02/28/23  0600   WBC 7.26 6.66   HGB 11.8* 10.5*   HCT 37.0* 33.3*    204     CMP:   Recent Labs   Lab 02/27/23  1827 02/28/23  0600    137   K 3.8 3.8    104   CO2 25 24   * 318*   BUN 15 16   CREATININE 1.0 1.0   CALCIUM 9.1 8.7   PROT 6.8  --    ALBUMIN 3.1*  --    BILITOT 0.7  --    ALKPHOS 132  --    AST 26  --    ALT 21  --    ANIONGAP 8 9     All pertinent labs within the past 24 hours have been reviewed.    Significant Imaging: X-Ray: I have reviewed all pertinent results/findings and my personal findings are:  distal third clavicle fracture    Assessment/Plan:     Closed nondisplaced fracture of acromial end of right clavicle  Kamar Muñoz is a 77 y.o. male with a right distal third clavicle fracture, closed, NVI. He is right hand dominant.    - Sling for comfort RUE  - NWB RUE, begin gentle ROM in 2 weeks  - MM pain control  per primary  - Follow up in orthopedic clinic at discharge, pt will be contacted with appointment detials            Raul Ralph MD  Orthopedics  [facility] - Intensive Care ([place]

## 2023-02-28 NOTE — HOSPITAL COURSE
2/28 Glucose in 300s.   A1c was 10.6. endocrine consulted . UC pending.  continue ceftriaxone . Closed nondisplaced fracture of acromial end of right clavicle with tenderness on moving his right arm . continue sling and he needs to follow with Orthopedics as outpatient. on aspirin Plavix were discontinued on his prior admission. started  on aspirin with history of MI. Plavix and Eliquis was held with history of multiple falls, need to discuss with family regarding continuation of Eliquis  .CT abdomen pelvis - Markedly distended rectum containing a large stool ball with associated rectal wall thickening and mild adjacent fat stranding.  Findings are suggestive of fecal impaction with possible stercoral colitis. Significant volume retained stool throughout the remaining colon suggesting constipation. constipation resolved. hea has 3 BMs since admission . patient is s/p Kyphoplasty L1 - Lumbar spine on 2/1/2023 by Dr. Spicer. s/p orthopedic surgery eval -right distal third clavicle fracture, closed, intact neurovascularity. Sling for comfort RUE. NWB RUE, begin gentle ROM in 2 weeks. PT/OT Consulted   3/1 UC no growth. ceftriaxone discontinued. patient reports 6 falls in last 2  months.  discussed with daughter Basia Bob [phone]  about risks of continuing anticoagulation with recurrent falls. reports he fell and hit his head on last thursday. s/p coronary stents 01/22. Daughter agreeable to hold, ASA,  plavix and eliquis for now. Orthostatic hypotension - lying 162/70 HR66, sitting 128/65 HR68, standing 114/58. flomax discontinued . NS bolus 250ml x 1  he is OK with patient returning back to [facility].  on vimpat for partial seizures. EEG ordered.  Discharge to NH s/p wound care eval

## 2023-02-28 NOTE — CONSULTS
[facility] ([facility]  Endocrinology  Diabetes Consult Note    Consult Requested by: Sudhakar Kan MD   Reason for admit: UTI (urinary tract infection)    HISTORY OF PRESENT ILLNESS:  Reason for Consult: Management of T2DM, Hyperglycemia     Surgical Procedure and Date: n/a    Diabetes diagnosis year: > 20 years    Home Diabetes Medications:  levemir 6 units BID, novolog 5 units with meals, januvia 50 mg daily  Lab Results   Component Value Date    HGBA1C 8.9 (H) 02/28/2023         How often checking glucose at home? 1-3 x day   BG readings on regimen: 's   Hypoglycemia on the regimen?  Yes; 2 times per week; no pattern  Missed doses on regimen?  No    Diabetes Complications include:     Hyperglycemia, Hypoglycemia , and Diabetic chronic kidney disease          Complicating diabetes co morbidities:   History of MI, History of CVA, and CKD      HPI:   Patient is a 77 y.o. male with a diagnosis of type 2 DM, HTN, pA-fib, HF, and CAD. Patient presents from a nursing home for altered mental status. Per NH staff, patient has been altered over the last two days. He has been hallucinating and speaking to family members who are not in the room. He has been confused. He also had a few episodes of vomiting earlier today. Emesis was reported to be NBNB. They also report that the patient fell recently with residual shoulder pain. Patient admitted for further workup and treatment. Endocrinology consulted for BG/ DM management.           Interval HPI:   Overnight events: Remains in 03514/20295 A. BG above goal with prn correction scale. IV antibiotics.   Eating:  Diet diabetic [facility]; 2000 Calorie   50%  Nausea: No  Hypoglycemia and intervention: No  Fever: No  TPN and/or TF: No    PMH, PSH, FH, SH reviewed     ROS:  Review of Systems  Constitutional: Negative for weight changes.  Eyes: + for visual disturbance.  Respiratory: Negative for cough.   Cardiovascular: Negative for chest  pain.  Gastrointestinal: Negative for nausea.  Endocrine: + for polyuria, polydipsia.  Musculoskeletal: Negative for back pain.  Skin: Negative for rash.  Neurological: Negative for syncope.  Psychiatric/Behavioral: Negative for depression.      Current Medications and/or Treatments Impacting Glycemic Control  Immunotherapy:    Immunosuppressants       None          Steroids:   Hormones (From admission, onward)      None          Pressors:    Autonomic Drugs (From admission, onward)      None          Hyperglycemia/Diabetes Medications:   Antihyperglycemics (From admission, onward)      Start     Stop Route Frequency Ordered    02/28/23 1130  insulin aspart U-100 pen 6 Units         -- SubQ 3 times daily with meals 02/28/23 0912    02/28/23 1015  insulin detemir U-100 pen 18 Units         -- SubQ Daily 02/28/23 0912    02/28/23 0229  insulin aspart U-100 pen 1-10 Units         -- SubQ Before meals & nightly PRN 02/28/23 0130             PHYSICAL EXAMINATION:  Vitals:    02/28/23 0815   BP: 129/69   Pulse: 69   Resp: 11   Temp: 98 °F (36.7 °C)     Body mass index is 21.06 kg/m².    Physical Exam  Constitutional: Well developed, well nourished, NAD.  ENT: External ears no masses with nose patent; normal hearing.  Neck: Supple; trachea midline.  Cardiovascular: Normal heart sounds, no LE edema. DP +2 bilaterally.  Lungs: Normal effort; lungs anterior bilaterally clear to auscultation.  Abdomen: Soft, no masses, no hernias.  MS: No clubbing or cyanosis of nails noted;  unable to assess gait.  Skin: No rashes, lesions, or ulcers; no nodules. Injection sites are ok. No lipo hypertropthy or atrophy.  Psychiatric: Good judgement and insight; normal mood and affect.  Neurological: Cranial nerves are grossly intact.   Foot: nails in good condition, no amputations noted.        Labs Reviewed and Include   Recent Labs   Lab 02/27/23  1827 02/28/23  0600   * 318*   CALCIUM 9.1 8.7   ALBUMIN 3.1*  --    PROT 6.8  --    NA  136 137   K 3.8 3.8   CO2 25 24    104   BUN 15 16   CREATININE 1.0 1.0   ALKPHOS 132  --    ALT 21  --    AST 26  --    BILITOT 0.7  --      Lab Results   Component Value Date    WBC 6.66 02/28/2023    HGB 10.5 (L) 02/28/2023    HCT 33.3 (L) 02/28/2023    MCV 91 02/28/2023     02/28/2023     No results for input(s): TSH, FREET4 in the last 168 hours.  Lab Results   Component Value Date    HGBA1C 8.9 (H) 02/28/2023       Nutritional status:   Body mass index is 21.06 kg/m².  Lab Results   Component Value Date    ALBUMIN 3.1 (L) 02/27/2023    ALBUMIN 2.6 (L) 10/24/2022    ALBUMIN 2.4 (L) 10/15/2022     No results found for: PREALBUMIN    Estimated Creatinine Clearance: 63 mL/min (based on SCr of 1 mg/dL).    Accu-Checks  Recent Labs     02/28/23  0418 02/28/23  0821   POCTGLUCOSE 335* 307*        ASSESSMENT and PLAN    Cardiac/Vascular  Dyslipidemia associated with type 2 diabetes mellitus  On statin per ADA guidelines.     Paroxysmal atrial fibrillation  May increase insulin resistance.         Renal/  * UTI (urinary tract infection)  Infection may increase insulin resistance.         Endocrine  Type 2 diabetes mellitus with hyperglycemia, with long-term current use of insulin  Endocrinology consulted for BG management.   BG goal 140-180    WBD 0.4 units/kg/day  - Resume Januvia 50 mg  - Levemir (Insulin Detemir) 12 units daily  - Novolog (Insulin Aspart) 5 units TIDWM and prn for BG excursions [facility] [place] (150/50)  - BG checks AC/HS  - Hypoglycemia protocol in place  - If blood glucose greater than 300, please ask patient not to eat food or drink anything other than water until correctional insulin has brought it back below 250    ** Please notify Endocrine for any change and/or advance in diet**  ** Please call Endocrine for any BG related issues **    Discharge Planning:   TBD. Please notify endocrinology prior to discharge.                Plan discussed with patient, family, and RN at bedside.       Cresencio Duke, DNP, FNP  Endocrinology  [facility] - Intensive Care ([place])

## 2023-02-28 NOTE — SUBJECTIVE & OBJECTIVE
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    Hyperlipidemia     Hypertension     Kidney stone     Migraine headache     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 08/02/2012    Paroxysmal atrial fibrillation     Pressure sore     STEMI involving right coronary artery 01/09/2022    Type II or unspecified type diabetes mellitus with neurological manifestations, uncontrolled(250.62) 03/08/2013    Urinary tract infection        Past Surgical History:   Procedure Laterality Date    BACK SURGERY      CATARACT EXTRACTION W/  INTRAOCULAR LENS IMPLANT Right     Per Dr Romero note 11/2018    COLONOSCOPY N/A 1/28/2019    Procedure: COLONOSCOPY Suprep;  Surgeon: Anh Johnson MD;  Location: [facility];  Service: Endoscopy;  Laterality: N/A;    ESOPHAGOGASTRODUODENOSCOPY N/A 9/15/2022    Procedure: EGD (ESOPHAGOGASTRODUODENOSCOPY);  Surgeon: Dashawn Evans MD;  Location: [facility];  Service: Endoscopy;  Laterality: N/A;    EYE SURGERY      HERNIA REPAIR      KYPHOPLASTY, SPINE, LUMBAR N/A 2/1/2023    Procedure: KYPHOPLASTY, SPINE, LUMBAR;  Surgeon: Kimberly Spicer MD;  Location: [facility] OR;  Service: Neurosurgery;  Laterality: N/A;  Ane: Gen  Blood: Type & Hold  Pos: Prone  Rad: C-arm x 2  Spec Equip: Depuy Synthes - Tray Cortez    LEFT HEART CATHETERIZATION Left 1/9/2022    Procedure: CATHETERIZATION, HEART, LEFT;  Surgeon: Will Hurst III, MD;  Location: [facility] CATH LAB/EP;  Service: Cardiology;  Laterality: Left;    renal stones      SHOULDER OPEN ROTATOR CUFF REPAIR         Review of patient's allergies indicates:   Allergen Reactions    Iodine      Other reaction(s): swelling  Other reaction(s): Itching  Other reaction(s):  Rash / IVP       Current Facility-Administered Medications   Medication    acetaminophen tablet 1,000 mg    amiodarone tablet 200 mg    aspirin chewable tablet 81 mg    atorvastatin tablet 40 mg    cefTRIAXone (ROCEPHIN) 1 g in dextrose 5 % in water (D5W) 5 % 50 mL IVPB (MB+)    dextrose 10% bolus 125 mL 125 mL    dextrose 10% bolus 125 mL 125 mL    dextrose 10% bolus 250 mL 250 mL    dextrose 10% bolus 250 mL 250 mL    dextrose 40 % gel 15,000 mg    dextrose 40 % gel 30,000 mg    docusate sodium capsule 100 mg    glucagon (human recombinant) injection 1 mg    glucose chewable tablet 16 g    insulin aspart U-100 pen 0-5 Units    insulin aspart U-100 pen 5 Units    [START ON 3/1/2023] insulin detemir U-100 pen 12 Units    metoprolol succinate (TOPROL-XL) 24 hr tablet 25 mg    SITagliptin phosphate tablet 50 mg    sodium chloride 0.9% flush 10 mL    sucralfate tablet 1 g    tamsulosin 24 hr capsule 0.4 mg    Tdap (BOOSTRIX) vaccine injection 0.5 mL     Family History       Problem Relation (Age of Onset)    Diabetes Father          Tobacco Use    Smoking status: Former     Packs/day: 1.50     Years: 25.00     Pack years: 37.50     Types: Cigarettes     Quit date: 1983     Years since quittin.1    Smokeless tobacco: Never   Substance and Sexual Activity    Alcohol use: No    Drug use: No    Sexual activity: Yes     Partners: Female     ROS  Constitutional: negative for fevers or chills  Eyes: negative visual changes or eye discharge  ENT: negative for ear pain or sore throat  Respiratory: negative for shortness of breath or cough  Cardiovascular: negative for chest pain or palpitations  Gastrointestinal: negative for abdominal pain, nausea, or vomiting  Genitourinary: negative for dysuria and flank pain  Neurological: negative for headaches or dizziness  Musculoskeletal: positive for right shoulder pain    Objective:     Vital Signs (Most Recent):  Temp: 98 °F (36.7 °C) (23 0815)  Pulse: 69 (23  "0815)  Resp: 11 (02/28/23 0815)  BP: 129/69 (02/28/23 0815)  SpO2: (!) 93 % (02/28/23 0815)   Vital Signs (24h Range):  Temp:  [97.9 °F (36.6 °C)-98.1 °F (36.7 °C)] 98 °F (36.7 °C)  Pulse:  [61-72] 69  Resp:  [11-20] 11  SpO2:  [93 %-98 %] 93 %  BP: (129-172)/(69-80) 129/69     Weight: 74.4 kg (164 lb)  Height: 6' 2" (188 cm)  Body mass index is 21.06 kg/m².      Intake/Output Summary (Last 24 hours) at 2/28/2023 1648  Last data filed at 2/28/2023 0616  Gross per 24 hour   Intake 50 ml   Output 400 ml   Net -350 ml       Ortho/SPM Exam  Gen:  No acute distress, well-developed, well nourished.  CV:  Peripherally well-perfused. 2+ radial pulses, symmetric.  Respiratory:  Normal respiratory effort. No accessory muscle use.   Head/Neck:  Normocephalic.  Atraumatic. Sclera anicteric. TM. Neck supple.  Neuro: CN 2-12 grossly intact. No FND.   Abdomen: Soft, NTND.      MSK:  Right Upper extremity  Inspection  - Skin intact throughout, no open wounds  - No swelling  - No ecchymosis, erythema, or signs of cellulitis  Palpation  - TTP to distal clavicle  Range of motion  - AROM and PROM of the shoulder, elbow, wrist, and hand intact, decreased AROM of the shoulder secondary to pain, mild pain with PROM of the shoulder  Stability  - No evidence of joint dislocation or abnormal laxity   Neurovascular  - AIN/PIN/Radial/Median/Ulnar Nerves assessed in isolation without deficit  - Able to give thumbs up, make "OK" sign, cross IF/LF, abduct/adduct fingers, make fist  - SILT throughout  - Compartments soft  - Radial artery palpated      Left Upper extremity  Inspection  - Skin intact throughout, no open wounds  - No swelling  - No ecchymosis, erythema, or signs of cellulitis  Palpation  - NonTTP throughout, no palpable abnormality   Range of motion  - AROM and PROM of the shoulder, elbow, wrist, and hand intact  Stability  - No evidence of joint dislocation or abnormal laxity   Neurovascular  - AIN/PIN/Radial/Median/Ulnar Nerves " "assessed in isolation without deficit  - Able to give thumbs up, make "OK" sign, cross IF/LF, abduct/adduct fingers, make fist  - SILT throughout  - Compartments soft  - Radial artery palpated      Right Lower Extremity  Inspection  - Skin intact throughout, no open wounds  - No swelling  - No ecchymosis, erythema, or signs of cellulitis  Palpation  - NonTTP throughout, no palpable abnormality   Range of motion  - AROM and PROM of the hip, knee, ankle, and foot intact  Stability  - No evidence of joint dislocation or abnormal laxity  Neurovascular  - TA/EHL/Gastroc/FHL assessed in isolation without deficit  - SILT throughout  - Compartments soft  - DP palpated   - Negative Log roll  - [facility]      Left Lower Extremity  Inspection  - Skin intact throughout, no open wounds  - No swelling  - No ecchymosis, erythema, or signs of cellulitis  Palpation  - NonTTP throughout, no palpable abnormality   Range of motion  - AROM and PROM of the hip, knee, ankle, and foot intact  Stability  - No evidence of joint dislocation or abnormal laxity  Neurovascular  - TA/EHL/Gastroc/FHL assessed in isolation without deficit  - SILT throughout  - Compartments soft  - DP palpated   - Negative Log roll  - [facility]    Spine/pelvis/axial body:  No pain with compression of pelvis  No chest wall or abdominal tenderness    Significant Labs: CBC:   Recent Labs   Lab 02/27/23 1827 02/28/23  0600   WBC 7.26 6.66   HGB 11.8* 10.5*   HCT 37.0* 33.3*    204     CMP:   Recent Labs   Lab 02/27/23 1827 02/28/23  0600    137   K 3.8 3.8    104   CO2 25 24   * 318*   BUN 15 16   CREATININE 1.0 1.0   CALCIUM 9.1 8.7   PROT 6.8  --    ALBUMIN 3.1*  --    BILITOT 0.7  --    ALKPHOS 132  --    AST 26  --    ALT 21  --    ANIONGAP 8 9     All pertinent labs within the past 24 hours have been reviewed.    Significant Imaging: X-Ray: I have reviewed all pertinent results/findings and my personal findings are: "  distal third clavicle fracture

## 2023-02-28 NOTE — ASSESSMENT & PLAN NOTE
Patient has a previous history of MI. supposed to be on aspirin, Plavix, statins.  He is currently only on statins and aspirin Plavix were discontinued on his prior admission.  Will start him back on aspirin for now and continue statin

## 2023-02-28 NOTE — ASSESSMENT & PLAN NOTE
Patient also complains of having tenderness on moving his right arm above his shoulder.  X ray right shoulder -  minimal irregularity of the superior margin of the distal right clavicle which could represent a small incomplete fracture of the distal right clavicle.   2/28    s/p orthopedic surgery eval -right distal third clavicle fracture, closed, intact neurovascularity. Sling for comfort RUE. NWB RUE, begin gentle ROM in 2 weeks. PT/OT Consulted

## 2023-02-28 NOTE — ASSESSMENT & PLAN NOTE
Patient also complains of having tenderness on moving his right arm above his shoulder.  On x-ray he does have questionable right clavicle fracture.  Patient will be placed on sling and he needs to follow with Orthopedics as outpatient

## 2023-02-28 NOTE — ED NOTES
Received report from RN. Pt resting in bed with eyes open. Chest rise and fall noted. VSS. Bed locked in lowest position. SR up x 2. Call light within reach.

## 2023-02-28 NOTE — HPI
Reason for Consult: Management of T2DM, Hyperglycemia     Surgical Procedure and Date: n/a    Diabetes diagnosis year: > 20 years    Home Diabetes Medications:  levemir 6 units BID, novolog 5 units with meals, januvia 50 mg daily  Lab Results   Component Value Date    HGBA1C 8.9 (H) 02/28/2023         How often checking glucose at home? 1-3 x day   BG readings on regimen: 's   Hypoglycemia on the regimen?  Yes; 2 times per week; no pattern  Missed doses on regimen?  No    Diabetes Complications include:     Hyperglycemia, Hypoglycemia , and Diabetic chronic kidney disease          Complicating diabetes co morbidities:   History of MI, History of CVA, and CKD      HPI:   Patient is a 77 y.o. male with a diagnosis of type 2 DM, HTN, pA-fib, HF, and CAD. Patient presents from a nursing home for altered mental status. Per NH staff, patient has been altered over the last two days. He has been hallucinating and speaking to family members who are not in the room. He has been confused. He also had a few episodes of vomiting earlier today. Emesis was reported to be NBNB. They also report that the patient fell recently with residual shoulder pain. Patient admitted for further workup and treatment. Endocrinology consulted for BG/ DM management.

## 2023-02-28 NOTE — ASSESSMENT & PLAN NOTE
Chronic, controlled.  Latest blood pressure and vitals reviewed-   Temp:  [97.8 °F (36.6 °C)-98.1 °F (36.7 °C)]   Pulse:  [61-72]   Resp:  [11-20]   BP: (129-172)/(69-80)   SpO2:  [93 %-98 %] .   Home meds for hypertension were reviewed  PRN meds if BP> 180/110 mm HG  Controlled on Metoprolol

## 2023-02-28 NOTE — HPI
Kamar Muñoz is a 77 y.o. male with PMH significant for type 2 diabetes mellitus, hypertension, paroxysmal atrial fibrillation,HFmEF, and MI who is admitted after a mechanical fall from his nursing home. Patient reports head trauma and LOC. Patient reports numbness and tingling to the finger tips of his right hand for over 6 months. Denies any other musculoskeletal pain or injuries. No known history of prior right shoulder injury or surgery.  Walks with the use of a walker at baseline. Does not take any anticoagulation at baseline.   They deny IV drug use.  They deny tobacco use.   They deny alcohol use.   They deny immunosuppressant medications.  They deny chemotherapy.  They deny radiation therapy.   He is right hand dominant. He lives at a nursing home.

## 2023-02-28 NOTE — PROGRESS NOTES
"Patient seen for wound care consultation for sacral/groin  Reviewed chart for this encounter.   See Flow Sheet for findings.    Pt laying supine in bed upon WC arrival, family at bedside.   Pt agreed to assessment. 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   Right arm, skin tear had telfa secured with coban, this was removed to reveal two small skin tears, covered with Mepilex Border Foam.   No open wound to groin, scrotum is excoriated and moist, keep dry, apply Triad (No diapers)  Pt assisted in turning self for sacral assessment, sacral foam border was removed to reveal, reddened area and a wound with 100% slough.      RECOMMENDATIONS:  See above.   Orders placed.     Discussed POC with patient and primary RN.   See EMR for orders & patient education    Nursing to continue care.  Nursing to maintain pressure injury prevention interventions.    Effie Baez, RN notified     02/28/23 1630   WOCN Assessment   WOCN Total Time (mins) 45   Visit Date 02/28/23   Visit Time 1630   Consult Type New   WOCN Speciality Wound   Wound moisture   Intervention assessed;changed;applied;chart review;coordination of care;consult other service;team conference;orders   Teaching on-going        Altered Skin Integrity 10/13/22 Groin   Date First Assessed: 10/13/22   Altered Skin Integrity Present on Admission: (c) yes  Location: Groin   Wound Image         Altered Skin Integrity 10/13/22 Sacral spine   Date First Assessed: 10/13/22   Altered Skin Integrity Present on Admission: yes  Location: Sacral spine   Wound Image     Wound Length (cm) 0.4 cm   Wound Width (cm) 0.5 cm   Wound Depth (cm) 0.7 cm   Wound Volume (cm^3) 0.14 cm^3   Wound Surface Area (cm^2) 0.2 cm^2        Altered Skin Integrity 02/28/23 0822 dorsal;upper Scalp Other (comment)   Date First Assessed/Time First Assessed: 02/28/23 0822   Altered Skin Integrity Present on Admission: yes  Orientation: dorsal;upper  Location: Scalp  Primary Wound Type: " (c) Other (comment)   Wound Image         Altered Skin Integrity 02/28/23 0823 Right posterior Elbow Abrasion(s)   Date First Assessed/Time First Assessed: 02/28/23 0823   Altered Skin Integrity Present on Admission: yes  Side: Right  Orientation: posterior  Location: Elbow  Primary Wound Type: Abrasion(s)   Wound Image

## 2023-02-28 NOTE — HPI
77-year-old male with past medical history of type 2 diabetes mellitus, hypertension, paroxysmal atrial fibrillation,HFmEF, MI presents from nursing home for mechanical fall.  Patient was recently admitted in early February for lumbar fracture following a fall to the Neurosurgery Service.  Presents today with a fall appeared confused.  For me patient appeared alert and oriented x2.  He denies having any fevers chills nausea vomiting dysuria polyuria.  Skeletal workup showed questionable fracture in his right clavicle and also patient complains of having tenderness and unable to move his arm above head due to pain.  He was also found to have elevated white blood cell count with CT abdominal findings concerning for pyelonephritis.  Also he was found to have disimpacted stool on CT however on manual disimpaction he had soft stool and also had a bowel movement as per ER staff.

## 2023-02-28 NOTE — H&P
[facility] - Emergency Dept  [facility] Medicine  History & Physical    Patient Name: Kamar Muñoz  MRN: [id number]  Patient Class: OP- Observation  Admission Date: 2/27/2023  Attending Physician: Sudhakar Kan MD   Primary Care Provider: Basim Guerrero MD         Patient information was obtained from patient and ER records.     Subjective:     Principal Problem:<principal problem not specified>    Chief Complaint:   Chief Complaint   Patient presents with    Altered Mental Status     Nurses report pt. Has been hallucinating and speaking to family members that are not there. Pt. Denies. EMS reports no hallucinations.  Pt. Reporting abd pain. Has sacral wound. From [facility]        HPI: 77-year-old male with past medical history of type 2 diabetes mellitus, hypertension, paroxysmal atrial fibrillation,HFmEF, MI presents from nursing home for mechanical fall.  Patient was recently admitted in early February for lumbar fracture following a fall to the Neurosurgery Service.  Presents today with a fall appeared confused.  For me patient appeared alert and oriented x2.  He denies having any fevers chills nausea vomiting dysuria polyuria.  Skeletal workup showed questionable fracture in his right clavicle and also patient complains of having tenderness and unable to move his arm above head due to pain.  He was also found to have elevated white blood cell count with CT abdominal findings concerning for pyelonephritis.  Also he was found to have disimpacted stool on CT however on manual disimpaction he had soft stool and also had a bowel movement as per ER staff.       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     "Hyperlipidemia     Hypertension     Kidney stone     Migraine headache     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 08/02/2012    Paroxysmal atrial fibrillation     Pressure sore     STEMI involving right coronary artery 01/09/2022    Type II or unspecified type diabetes mellitus with neurological manifestations, uncontrolled(250.62) 03/08/2013    Urinary tract infection            Review of patient's allergies indicates:   Allergen Reactions    Iodine      Other reaction(s): swelling  Other reaction(s): Itching  Other reaction(s): Rash / IVP       No current facility-administered medications on file prior to encounter.     Current Outpatient Medications on File Prior to Encounter   Medication Sig    acetaminophen (TYLENOL) 650 MG TbSR Take 1 tablet (650 mg total) by mouth every 6 (six) hours as needed (pain).    amiodarone (PACERONE) 200 MG Tab Take 1 tablet (200 mg total) by mouth once daily.    atorvastatin (LIPITOR) 40 MG tablet Take 1 tablet (40 mg total) by mouth once daily.    BD ULTRA-FINE SHORT PEN NEEDLE 31 gauge x 5/16" Ndle 3 (three) times daily.    blood sugar diagnostic Strp 1 strip by Misc.(Non-Drug; Combo Route) route 2 (two) times a day.    cetirizine (ZYRTEC) 10 MG tablet Take 1 tablet (10 mg total) by mouth every evening.    ciprofloxacin HCl (CIPRO) 500 MG tablet     docusate sodium (COLACE) 100 MG capsule Take 100 mg by mouth once daily at 6am.    gabapentin (NEURONTIN) 100 MG capsule Take 1 capsule (100 mg total) by mouth 2 (two) times daily.    glucose 4 GM chewable tablet Take 4 tablets (16 g total) by mouth as needed for Low blood sugar (50 - 70).    glucose 4 GM chewable tablet Take 6 tablets (24 g total) by mouth as needed for Low blood sugar (< 50).    HYDROcodone-acetaminophen (NORCO) 5-325 mg per tablet Take 1 tablet by mouth every 6 (six) hours as needed for Pain.    insulin aspart U-100 (NOVOLOG) 100 unit/mL (3 mL) InPn pen Inject 1-10 Units into the skin before " "meals and at bedtime as needed (Hyperglycemia).    insulin aspart U-100 (NOVOLOG) 100 unit/mL (3 mL) InPn pen Inject 15 Units into the skin 3 (three) times daily with meals. (Patient taking differently: Inject 5 Units into the skin 3 (three) times daily with meals.)    insulin detemir U-100 (LEVEMIR FLEXTOUCH) 100 unit/mL (3 mL) SubQ InPn pen Inject 6 Units into the skin 2 (two) times daily.    insulin glargine,hum.rec.anlog (LANTUS SOLOSTAR U-100 INSULIN SUBQ) Inject 12 Units into the skin 2 (two) times a day.    insulin lispro 100 unit/mL injection Inject 4 Units into the skin.    JANUVIA 50 mg Tab Take 100 mg by mouth.    lacosamide (VIMPAT) 100 mg Tab Take 1 tablet (100 mg total) by mouth every 12 (twelve) hours.    lancets (ONETOUCH ULTRASOFT LANCETS) Misc 1 lancet by Misc.(Non-Drug; Combo Route) route 2 (two) times a day.    losartan (COZAAR) 25 MG tablet Take 25 mg by mouth once daily.    MAGNESIUM ORAL Take 400 mg by mouth once.    methocarbamoL (ROBAXIN) 500 MG Tab Take 500 mg by mouth daily as needed.    metoprolol succinate (TOPROL-XL) 50 MG 24 hr tablet Take 25 mg by mouth once daily.    nitroGLYCERIN (NITROSTAT) 0.4 MG SL tablet Place 1 tablet (0.4 mg total) under the tongue every 5 (five) minutes as needed for Chest pain.    ondansetron (ZOFRAN-ODT) 4 MG TbDL Take 4 mg by mouth every 6 (six) hours as needed.    pantoprazole (PROTONIX) 40 MG tablet Take 1 tablet (40 mg total) by mouth once daily.    pen needle, diabetic (PEN NEEDLE) 30 gauge x 5/16" Ndle 1 Units by Misc.(Non-Drug; Combo Route) route 3 (three) times daily.    polycarbophil (FIBERCON) 625 mg tablet Take 1 tablet by mouth once daily.     psyllium husk, with sugar, (METAMUCIL, WITH SUGAR,) 3.4 gram packet Take 1 packet by mouth 2 (two) times daily.    sertraline (ZOLOFT) 50 MG tablet Take 50 mg by mouth once daily.    sucralfate (CARAFATE) 1 gram tablet Take 1 g by mouth 3 (three) times daily before meals.    tamsulosin " (FLOMAX) 0.4 mg Cap Take 1 capsule (0.4 mg total) by mouth every evening.    vitamin D (VITAMIN D3) 1000 units Tab Take 1,000 Units by mouth once daily.    [DISCONTINUED] albuterol (PROVENTIL/VENTOLIN HFA) 90 mcg/actuation inhaler Inhale 2 puffs into the lungs every 4 (four) hours as needed for Wheezing (Pt states he will take it on his own. MDI placed by bedside.). Rescue    [DISCONTINUED] blood-glucose meter,continuous (DEXCOM G5 ) Misc 1 Device by Misc.(Non-Drug; Combo Route) route once daily at 6am.    [DISCONTINUED] blood-glucose transmitter (DEXCOM G5 TRANSMITTER) Stephanie 1 Device by Misc.(Non-Drug; Combo Route) route once daily at 6am.    [DISCONTINUED] diltiaZEM (CARDIZEM CD) 120 MG Cp24 Take 1 capsule (120 mg total) by mouth once daily.    [DISCONTINUED] metFORMIN (GLUCOPHAGE) 1000 MG tablet Take 1 tablet (1,000 mg total) by mouth 2 (two) times daily with meals.     Family History       Problem Relation (Age of Onset)    Diabetes Father          Tobacco Use    Smoking status: Former     Packs/day: 1.50     Years: 25.00     Pack years: 37.50     Types: Cigarettes     Quit date: 1983     Years since quittin.1    Smokeless tobacco: Never   Substance and Sexual Activity    Alcohol use: No    Drug use: No    Sexual activity: Yes     Partners: Female     Review of Systems   Constitutional: Negative.    HENT: Negative.     Eyes: Negative.    Respiratory: Negative.     Cardiovascular: Negative.    Gastrointestinal: Negative.    Endocrine: Negative.    Genitourinary: Negative.    Neurological: Negative.    Hematological: Negative.    Objective:     Vital Signs (Most Recent):  Temp: 98 °F (36.7 °C) (23 1724)  Pulse: 65 (23)  Resp: 17 (23)  BP: (!) 172/79 (23)  SpO2: 96 % (23)   Vital Signs (24h Range):  Temp:  [97.8 °F (36.6 °C)-98 °F (36.7 °C)] 98 °F (36.7 °C)  Pulse:  [61-66] 65  Resp:  [14-18] 17  SpO2:  [95 %-98 %] 96 %  BP:  (134-172)/(74-80) 172/79     Weight: 74.4 kg (164 lb)  Body mass index is 21.64 kg/m².    Physical Exam  Constitutional:       Appearance: Normal appearance.   HENT:      Head: Normocephalic and atraumatic.   Eyes:      Extraocular Movements: Extraocular movements intact.      Pupils: Pupils are equal, round, and reactive to light.   Cardiovascular:      Rate and Rhythm: Normal rate and regular rhythm.   Pulmonary:      Effort: Pulmonary effort is normal.      Breath sounds: Normal breath sounds.   Abdominal:      General: Abdomen is flat. Bowel sounds are normal.      Palpations: Abdomen is soft.   Skin:     General: Skin is warm.      Capillary Refill: Capillary refill takes less than 2 seconds.   Neurological:      General: No focal deficit present.      Mental Status: He is alert and oriented to person, place, and time.         CRANIAL NERVES     CN III, IV, VI   Pupils are equal, round, and reactive to light.     Significant Labs: All pertinent labs within the past 24 hours have been reviewed.    Significant Imaging: I have reviewed all pertinent imaging results/findings within the past 24 hours.    Assessment/Plan:         UTI (urinary tract infection)    Patient has elevated white blood cell count on urinalysis.  Will continue Rocephin and follow up on urine cultures    Closed nondisplaced fracture of acromial end of right clavicle    Patient also complains of having tenderness on moving his right arm above his shoulder.  On x-ray he does have questionable right clavicle fracture.  Patient will be placed on sling and he needs to follow with Orthopedics as outpatient      Dyslipidemia associated with type 2 diabetes mellitus  Continue statins    Coronary artery disease    Patient has a previous history of MI. supposed to be on aspirin, Plavix, statins.  He is currently only on statins and aspirin Plavix were discontinued on his prior admission.  Will start him back on aspirin for now and continue  statin    Paroxysmal atrial fibrillation  Patient has history of paroxysmal atrial fibrillation and was on rate control with Toprol, rhythm control with amiodarone and anticoagulation with Eliquis.  On prior admission when he presented with lumbar fracture his aspirin/Plavix/Eliquis was held.  Given his history of multiple falls, shared decision making needs to be addressed with the family regarding continuation of Eliquis for his anticoagulation    Type 2 diabetes mellitus with hyperglycemia, with long-term current use of insulin  Patient's last A1c was 10.6.  Will keep him on sliding scale and a.c.    Essential hypertension    Continue home blood pressure medications      VTE Risk Mitigation (From admission, onward)         Ordered     IP VTE HIGH RISK PATIENT  Once         02/28/23 0058     Place sequential compression device  Until discontinued         02/28/23 0058                   Buzz Malik MD  Department of Hospital Medicine   [facility] - Emergency Dept

## 2023-02-28 NOTE — ASSESSMENT & PLAN NOTE
Patient's with Normocytic anemia.. Hemoglobin stable. Etiology likely due to chronic disease .  Current CBC reviewed-  Recent Labs   Lab 02/27/23  1827 02/28/23  0600   HGB 11.8* 10.5*       Monitor CBC and transfuse if H/H drops below 7/21.

## 2023-02-28 NOTE — ASSESSMENT & PLAN NOTE
Kamar Muñoz is a 77 y.o. male with a right distal third clavicle fracture, closed, NVI. He is right hand dominant.    - Sling for comfort RUE  - NWB RUE, begin gentle ROM in 2 weeks  - MM pain control per primary  - Follow up in orthopedic clinic at discharge, pt will be contacted with appointment detials

## 2023-02-28 NOTE — ASSESSMENT & PLAN NOTE
Patient has elevated white blood cell count on urinalysis.  Will continue Rocephin and follow up on urine cultures

## 2023-03-01 ENCOUNTER — DOCUMENTATION ONLY (OUTPATIENT)
Dept: NEUROLOGY | Facility: CLINIC | Age: 78
End: 2023-03-01

## 2023-03-01 VITALS
HEART RATE: 63 BPM | OXYGEN SATURATION: 95 % | DIASTOLIC BLOOD PRESSURE: 66 MMHG | RESPIRATION RATE: 15 BRPM | HEIGHT: 74 IN | SYSTOLIC BLOOD PRESSURE: 139 MMHG | BODY MASS INDEX: 21.05 KG/M2 | WEIGHT: 164 LBS | TEMPERATURE: 98 F

## 2023-03-01 PROBLEM — I95.1 ORTHOSTATIC HYPOTENSION: Status: ACTIVE | Noted: 2022-10-12

## 2023-03-01 PROBLEM — L98.9 SCALP LESION: Status: ACTIVE | Noted: 2023-03-01

## 2023-03-01 PROBLEM — R56.9 PARTIAL SEIZURES: Status: ACTIVE | Noted: 2023-03-01

## 2023-03-01 LAB
ANION GAP SERPL CALC-SCNC: 7 MMOL/L (ref 8–16)
BACTERIA UR CULT: NO GROWTH
BASOPHILS # BLD AUTO: 0.03 K/UL (ref 0–0.2)
BASOPHILS NFR BLD: 0.5 % (ref 0–1.9)
BUN SERPL-MCNC: 18 MG/DL (ref 8–23)
CALCIUM SERPL-MCNC: 8.6 MG/DL (ref 8.7–10.5)
CHLORIDE SERPL-SCNC: 106 MMOL/L (ref 95–110)
CO2 SERPL-SCNC: 27 MMOL/L (ref 23–29)
CREAT SERPL-MCNC: 1 MG/DL (ref 0.5–1.4)
DIFFERENTIAL METHOD: ABNORMAL
EOSINOPHIL # BLD AUTO: 0.2 K/UL (ref 0–0.5)
EOSINOPHIL NFR BLD: 3.9 % (ref 0–8)
ERYTHROCYTE [DISTWIDTH] IN BLOOD BY AUTOMATED COUNT: 13.8 % (ref 11.5–14.5)
EST. GFR  (NO RACE VARIABLE): >60 ML/MIN/1.73 M^2
GLUCOSE SERPL-MCNC: 107 MG/DL (ref 70–110)
HCT VFR BLD AUTO: 32.5 % (ref 40–54)
HGB BLD-MCNC: 10.3 G/DL (ref 14–18)
IMM GRANULOCYTES # BLD AUTO: 0.02 K/UL (ref 0–0.04)
IMM GRANULOCYTES NFR BLD AUTO: 0.3 % (ref 0–0.5)
LYMPHOCYTES # BLD AUTO: 1.7 K/UL (ref 1–4.8)
LYMPHOCYTES NFR BLD: 27.9 % (ref 18–48)
MCH RBC QN AUTO: 28.1 PG (ref 27–31)
MCHC RBC AUTO-ENTMCNC: 31.7 G/DL (ref 32–36)
MCV RBC AUTO: 89 FL (ref 82–98)
MONOCYTES # BLD AUTO: 0.7 K/UL (ref 0.3–1)
MONOCYTES NFR BLD: 10.8 % (ref 4–15)
NEUTROPHILS # BLD AUTO: 3.5 K/UL (ref 1.8–7.7)
NEUTROPHILS NFR BLD: 56.6 % (ref 38–73)
NRBC BLD-RTO: 0 /100 WBC
PLATELET # BLD AUTO: 226 K/UL (ref 150–450)
PMV BLD AUTO: 9.6 FL (ref 9.2–12.9)
POCT GLUCOSE: 125 MG/DL (ref 70–110)
POCT GLUCOSE: 131 MG/DL (ref 70–110)
POCT GLUCOSE: 254 MG/DL (ref 70–110)
POTASSIUM SERPL-SCNC: 3.6 MMOL/L (ref 3.5–5.1)
RBC # BLD AUTO: 3.66 M/UL (ref 4.6–6.2)
SARS-COV-2 RNA RESP QL NAA+PROBE: NOT DETECTED
SODIUM SERPL-SCNC: 140 MMOL/L (ref 136–145)
WBC # BLD AUTO: 6.12 K/UL (ref 3.9–12.7)

## 2023-03-01 PROCEDURE — 97535 SELF CARE MNGMENT TRAINING: CPT

## 2023-03-01 PROCEDURE — 97165 OT EVAL LOW COMPLEX 30 MIN: CPT

## 2023-03-01 PROCEDURE — 95816 EEG AWAKE AND DROWSY: CPT | Mod: 26,,, | Performed by: PSYCHIATRY & NEUROLOGY

## 2023-03-01 PROCEDURE — 96366 THER/PROPH/DIAG IV INF ADDON: CPT

## 2023-03-01 PROCEDURE — 63600175 PHARM REV CODE 636 W HCPCS: Performed by: NURSE PRACTITIONER

## 2023-03-01 PROCEDURE — 25000003 PHARM REV CODE 250: Performed by: HOSPITALIST

## 2023-03-01 PROCEDURE — 99223 1ST HOSP IP/OBS HIGH 75: CPT | Mod: ,,, | Performed by: ORTHOPAEDIC SURGERY

## 2023-03-01 PROCEDURE — 63600175 PHARM REV CODE 636 W HCPCS: Performed by: HOSPITALIST

## 2023-03-01 PROCEDURE — 99239 PR HOSPITAL DISCHARGE DAY,>30 MIN: ICD-10-PCS | Mod: ,,, | Performed by: HOSPITALIST

## 2023-03-01 PROCEDURE — 36415 COLL VENOUS BLD VENIPUNCTURE: CPT | Performed by: HOSPITALIST

## 2023-03-01 PROCEDURE — 85025 COMPLETE CBC W/AUTO DIFF WBC: CPT | Performed by: HOSPITALIST

## 2023-03-01 PROCEDURE — 97530 THERAPEUTIC ACTIVITIES: CPT

## 2023-03-01 PROCEDURE — 96361 HYDRATE IV INFUSION ADD-ON: CPT

## 2023-03-01 PROCEDURE — 99239 HOSP IP/OBS DSCHRG MGMT >30: CPT | Mod: ,,, | Performed by: HOSPITALIST

## 2023-03-01 PROCEDURE — 80235 DRUG ASSAY LACOSAMIDE: CPT | Performed by: HOSPITALIST

## 2023-03-01 PROCEDURE — U0005 INFEC AGEN DETEC AMPLI PROBE: HCPCS | Performed by: HOSPITALIST

## 2023-03-01 PROCEDURE — 99223 PR INITIAL HOSPITAL CARE,LEVL III: ICD-10-PCS | Mod: ,,, | Performed by: ORTHOPAEDIC SURGERY

## 2023-03-01 PROCEDURE — 96372 THER/PROPH/DIAG INJ SC/IM: CPT | Performed by: NURSE PRACTITIONER

## 2023-03-01 PROCEDURE — 80048 BASIC METABOLIC PNL TOTAL CA: CPT | Performed by: HOSPITALIST

## 2023-03-01 PROCEDURE — 95816 PR EEG,W/AWAKE & DROWSY RECORD: ICD-10-PCS | Mod: 26,,, | Performed by: PSYCHIATRY & NEUROLOGY

## 2023-03-01 PROCEDURE — G0378 HOSPITAL OBSERVATION PER HR: HCPCS

## 2023-03-01 PROCEDURE — 97162 PT EVAL MOD COMPLEX 30 MIN: CPT

## 2023-03-01 RX ORDER — LACOSAMIDE 100 MG/1
100 TABLET ORAL EVERY 12 HOURS
Status: DISCONTINUED | OUTPATIENT
Start: 2023-03-01 | End: 2023-03-01 | Stop reason: HOSPADM

## 2023-03-01 RX ORDER — POLYETHYLENE GLYCOL 3350 17 G/17G
17 POWDER, FOR SOLUTION ORAL DAILY
Qty: 510 G | Refills: 2 | Status: SHIPPED | OUTPATIENT
Start: 2023-03-01

## 2023-03-01 RX ORDER — ACETAMINOPHEN 500 MG
1000 TABLET ORAL EVERY 8 HOURS
Qty: 60 TABLET | Refills: 0 | Status: ON HOLD | OUTPATIENT
Start: 2023-03-01 | End: 2024-03-01 | Stop reason: HOSPADM

## 2023-03-01 RX ORDER — INSULIN ASPART 100 [IU]/ML
5 INJECTION, SOLUTION INTRAVENOUS; SUBCUTANEOUS
Qty: 54 ML | Refills: 0 | Status: ON HOLD | OUTPATIENT
Start: 2023-03-01 | End: 2023-03-26 | Stop reason: SDUPTHER

## 2023-03-01 RX ADMIN — ACETAMINOPHEN 1000 MG: 500 TABLET ORAL at 06:03

## 2023-03-01 RX ADMIN — ACETAMINOPHEN 1000 MG: 500 TABLET ORAL at 01:03

## 2023-03-01 RX ADMIN — METOPROLOL SUCCINATE 25 MG: 25 TABLET, EXTENDED RELEASE ORAL at 10:03

## 2023-03-01 RX ADMIN — DOCUSATE SODIUM 100 MG: 100 CAPSULE ORAL at 10:03

## 2023-03-01 RX ADMIN — INSULIN ASPART 5 UNITS: 100 INJECTION, SOLUTION INTRAVENOUS; SUBCUTANEOUS at 01:03

## 2023-03-01 RX ADMIN — SUCRALFATE 1 G: 1 TABLET ORAL at 05:03

## 2023-03-01 RX ADMIN — INSULIN ASPART 5 UNITS: 100 INJECTION, SOLUTION INTRAVENOUS; SUBCUTANEOUS at 10:03

## 2023-03-01 RX ADMIN — SUCRALFATE 1 G: 1 TABLET ORAL at 06:03

## 2023-03-01 RX ADMIN — AMIODARONE HYDROCHLORIDE 200 MG: 200 TABLET ORAL at 10:03

## 2023-03-01 RX ADMIN — SUCRALFATE 1 G: 1 TABLET ORAL at 12:03

## 2023-03-01 RX ADMIN — INSULIN ASPART 3 UNITS: 100 INJECTION, SOLUTION INTRAVENOUS; SUBCUTANEOUS at 01:03

## 2023-03-01 RX ADMIN — INSULIN ASPART 5 UNITS: 100 INJECTION, SOLUTION INTRAVENOUS; SUBCUTANEOUS at 05:03

## 2023-03-01 RX ADMIN — SODIUM CHLORIDE 250 ML: 9 INJECTION, SOLUTION INTRAVENOUS at 12:03

## 2023-03-01 RX ADMIN — CEFTRIAXONE 1 G: 1 INJECTION, POWDER, FOR SOLUTION INTRAMUSCULAR; INTRAVENOUS at 06:03

## 2023-03-01 RX ADMIN — SITAGLIPTIN 50 MG: 25 TABLET, FILM COATED ORAL at 11:03

## 2023-03-01 RX ADMIN — ATORVASTATIN CALCIUM 40 MG: 40 TABLET, FILM COATED ORAL at 10:03

## 2023-03-01 RX ADMIN — LACOSAMIDE 100 MG: 100 TABLET, FILM COATED ORAL at 12:03

## 2023-03-01 RX ADMIN — ASPIRIN 81 MG: 81 TABLET, CHEWABLE ORAL at 10:03

## 2023-03-01 NOTE — DISCHARGE SUMMARY
[facility] - [facility] ([place] [address])  [facility] Medicine  Discharge Summary      Patient Name: Kamar Muñoz  MRN: [id number]  JAMEEL: [id number]  Patient Class: OP- Observation  Admission Date: 2/27/2023  Hospital Length of Stay: 0 days  Discharge Date and Time:  03/01/2023 9:13 AM  Attending Physician: Sudhakar Kan MD   Discharging Provider: Sudhakar Kan MD  Primary Care Provider: Basim Guerrero MD  [facility] Medicine Team: [facility] HOSP MED N Sudhakar Kan MD  Primary Care Team: [facility] MED N    HPI:   77-year-old male with past medical history of type 2 diabetes mellitus, hypertension, paroxysmal atrial fibrillation,HFmEF, MI presents from nursing home for mechanical fall.  Patient was recently admitted in early February for lumbar fracture following a fall to the Neurosurgery Service.  Presents today with a fall appeared confused.  For me patient appeared alert and oriented x2.  He denies having any fevers chills nausea vomiting dysuria polyuria.  Skeletal workup showed questionable fracture in his right clavicle and also patient complains of having tenderness and unable to move his arm above head due to pain.  He was also found to have elevated white blood cell count with CT abdominal findings concerning for pyelonephritis.  Also he was found to have disimpacted stool on CT however on manual disimpaction he had soft stool and also had a bowel movement as per ER staff.       * No surgery found *      Hospital Course:   2/28 Glucose in 300s.   A1c was 10.6. endocrine consulted . UC pending.  continue ceftriaxone . Closed nondisplaced fracture of acromial end of right clavicle with tenderness on moving his right arm . continue sling and he needs to follow with Orthopedics as outpatient. on aspirin Plavix were discontinued on his prior admission. started  on aspirin with history of MI. Plavix and Eliquis was held with history of multiple falls, need to discuss with family regarding  continuation of Eliquis  .CT abdomen pelvis - Markedly distended rectum containing a large stool ball with associated rectal wall thickening and mild adjacent fat stranding.  Findings are suggestive of fecal impaction with possible stercoral colitis. Significant volume retained stool throughout the remaining colon suggesting constipation. constipation resolved. maisha has 3 BMs since admission . patient is s/p Kyphoplasty L1 - Lumbar spine on 2/1/2023 by Dr. Spicer. s/p orthopedic surgery eval -right distal third clavicle fracture, closed, intact neurovascularity. Sling for comfort RUE. NWB RUE, begin gentle ROM in 2 weeks. PT/OT Consulted   3/1 UC no growth. ceftriaxone discontinued. patient reports 6 falls in last 2  months.  discussed with daughter Basia Bob [phone]  about risks of continuing anticoagulation with recurrent falls. reports he fell and hit his head on last thursday. s/p coronary stents 01/22. Daughter agreeable to hold, ASA,  plavix and eliquis for now. he is OK with patient returning back to [facility].   Discharge to NH s/p wound care eval             Goals of Care Treatment Preferences:  Code Status: DNR      Consults:   Consults (From admission, onward)          Status Ordering Provider     Inpatient consult to Dermatology  Once        Provider:  (Not yet assigned)    Acknowledged GAB CASTAÑEDA     Inpatient consult to Orthopedics  Once        Provider:  (Not yet assigned)    Completed GAB CASTAÑEDA     Inpatient consult to Registered Dietitian/Nutritionist  Once        Provider:  (Not yet assigned)    Completed LATRICE LYONS          Assessment/Plan:      * UTI (urinary tract infection)     Patient has elevated white blood cell count on urinalysis.  Will continue Rocephin and follow up on urine cultures  3/1 UC no growth. ceftriaxone discontinued.       Scalp lesion  3/1 painful nodular growth on scalp. dermatology consulted with H/o skin cancer .Dermatology clinic  clinic within 1-2 weeks of discharge.        Constipation  2/28 CT abdomen pelvis - Markedly distended rectum containing a large stool ball with associated rectal wall thickening and mild adjacent fat stranding.  Findings are suggestive of fecal impaction with possible stercoral colitis. Significant volume retained stool throughout the remaining colon suggesting constipation.     resolved. had 4 BMs overnight     Anemia     Patient's with Normocytic anemia.. Hemoglobin stable. Etiology likely due to chronic disease .       Current CBC reviewed-  Recent Labs   Lab 02/27/23  1827 02/28/23  0600   HGB 11.8* 10.5*         Monitor CBC and transfuse if H/H drops below 7/21.         Closed nondisplaced fracture of acromial end of right clavicle     Patient also complains of having tenderness on moving his right arm above his shoulder.  X ray right shoulder -  minimal irregularity of the superior margin of the distal right clavicle which could represent a small incomplete fracture of the distal right clavicle.   2/28    s/p orthopedic surgery eval -right distal third clavicle fracture, closed, intact neurovascularity. Sling for comfort RUE. NWB RUE, begin gentle ROM in 2 weeks. PT/OT Consulted         Frequent falls  PT/OT evaluation  3/1    discussed with daughter Basia Bob [phone]  about risks of continuing anticoagulation with recurrent falls. s/p coronary stents 01/22. reports he fell and hit his head on last thursday. s/p coronary stents 01/22. Daughter agreeable to hold, ASA,  plavix and eliquis for now. she is OK with patient returning back to [facility].       Dyslipidemia associated with type 2 diabetes mellitus  Continue atorvastatin     Encephalopathy, metabolic  secondary to above . AAOX2. improved         Coronary artery disease     Patient has a previous history of MI. supposed to be on aspirin, Plavix, statins.  He is currently only on statins and aspirin Plavix were discontinued on his  prior admission.  Will start him back on aspirin for now and continue statin     Paroxysmal atrial fibrillation  Patient has history of paroxysmal atrial fibrillation and was on rate control with Toprol, rhythm control with amiodarone and anticoagulation with Eliquis.  On prior admission when he presented with lumbar fracture his aspirin/Plavix/Eliquis was held.  Given his history of multiple falls, shared decision making needs to be addressed with the family regarding continuation of Eliquis for his anticoagulation  3/1  patient reports 6 falls in last 2  months.  discussed with daughter Basia Bob [phone]  about risks of continuing anticoagulation with recurrent falls. s/p coronary stents 01/22. Daughter agreeable to hold  ASA, plavix and eliquis for now.       Type 2 diabetes mellitus with hyperglycemia, with long-term current use of insulin  Patient's last A1c was 10.6.  Will keep him on sliding scale and a.c.  2/28  Patient's FSGs are not controlled on current hypoglycemics.   Last A1c reviewed-         Lab Results   Component Value Date     HGBA1C 8.9 (H) 02/28/2023     HGBA1C 10.6 (H) 10/05/2022     HGBA1C 9.7 (H) 04/05/2022      Will hold PO hypoglycemics and will start correctional scale insulin  Most recent fingerstick glucose reviewed-          Recent Labs   Lab 02/28/23  1200 02/28/23  1658 02/28/23  2033 03/01/23  0745   POCTGLUCOSE 202* 88 108 125*      currently on   Antihyperglycemics (From admission, onward)        Start     Stop Route Frequency Ordered     03/01/23 0900   insulin detemir U-100 pen 12 Units         -- SubQ Daily 02/28/23 1118     02/28/23 1403   insulin aspart U-100 pen 0-5 Units         -- SubQ Before meals & nightly PRN 02/28/23 1303     02/28/23 1315   SITagliptin phosphate tablet 50 mg         -- Oral Daily 02/28/23 1311     02/28/23 1130   insulin aspart U-100 pen 5 Units         -- SubQ 3 times daily with meals 02/28/23 1118          2/28 Glucose in 300s.   A1c was  10.6. endocrine consulted       Essential hypertension     Chronic, controlled.  Latest blood pressure and vitals reviewed-   Temp:  [97.8 °F (36.6 °C)-98.1 °F (36.7 °C)]   Pulse:  [61-72]   Resp:  [11-20]   BP: (129-172)/(69-80)   SpO2:  [93 %-98 %] .   Home meds for hypertension were reviewed  PRN meds if BP> 180/110 mm HG  Controlled on Metoprolol    right frontal Partial seizures  continue vimpat . EEG ordered . neurology f/u       Final Active Diagnoses:    Diagnosis Date Noted POA    PRINCIPAL PROBLEM:  UTI (urinary tract infection) [N39.0] 02/28/2023 Unknown    Scalp lesion [L98.9] 03/01/2023 Yes    Closed nondisplaced fracture of acromial end of right clavicle [S42.034A] 02/28/2023 Yes    Anemia [D64.9] 02/28/2023 Yes    Constipation [K59.00] 02/28/2023 Yes    Frequent falls [R29.6] 12/01/2022 Not Applicable    Dyslipidemia associated with type 2 diabetes mellitus [E11.69, E78.5] 04/23/2022 Yes    Encephalopathy, metabolic [G93.41] 01/25/2022 Yes    Coronary artery disease [I25.10] 01/19/2022 Yes    Paroxysmal atrial fibrillation [I48.0] 01/12/2022 Yes     Chronic    Type 2 diabetes mellitus with hyperglycemia, with long-term current use of insulin [E11.65, Z79.4] 03/08/2013 Not Applicable     Chronic    Essential hypertension [I10] 07/20/2012 Yes     Chronic      Problems Resolved During this Admission:    Diagnosis Date Noted Date Resolved POA    Stented coronary artery [Z95.5] 01/19/2022 02/28/2023 Not Applicable         Medications:  Reconciled Home Medications:      Medication List        Start taking these medications      acetaminophen 500 MG tablet  Commonly known as: TYLENOL  Take 2 tablets (1,000 mg total) by mouth every 8 (eight) hours.  Replaces: acetaminophen 650 MG Tbsr     polyethylene glycol 17 gram Pwpk  Commonly known as: MIRALAX  Take 17 g by mouth once daily.            Change how you take these medications      insulin aspart U-100 100 unit/mL (3 mL) Inpn pen  Commonly known as:  "NovoLOG  Inject 5 Units into the skin 3 (three) times daily with meals.  What changed:   how much to take  Another medication with the same name was removed. Continue taking this medication, and follow the directions you see here.     pen needle, diabetic 30 gauge x 5/16" Ndle  Commonly known as: PEN NEEDLE  1 Units by Misc.(Non-Drug; Combo Route) route 3 (three) times daily.  What changed: Another medication with the same name was removed. Continue taking this medication, and follow the directions you see here.     SITagliptin phosphate 50 MG Tab  Commonly known as: JANUVIA  Take 1 tablet (50 mg total) by mouth once daily.  What changed:   how much to take  when to take this            Continue taking these medications      amiodarone 200 MG Tab  Commonly known as: PACERONE  Take 1 tablet (200 mg total) by mouth once daily.     atorvastatin 40 MG tablet  Commonly known as: LIPITOR  Take 1 tablet (40 mg total) by mouth once daily.     blood sugar diagnostic Strp  1 strip by Misc.(Non-Drug; Combo Route) route 2 (two) times a day.     gabapentin 100 MG capsule  Commonly known as: NEURONTIN  Take 1 capsule (100 mg total) by mouth 2 (two) times daily.     * glucose 4 GM chewable tablet  Take 4 tablets (16 g total) by mouth as needed for Low blood sugar (50 - 70).     * glucose 4 GM chewable tablet  Take 6 tablets (24 g total) by mouth as needed for Low blood sugar (< 50).     insulin detemir U-100 100 unit/mL (3 mL) Inpn pen  Commonly known as: Levemir FLEXTOUCH  Inject 6 Units into the skin 2 (two) times daily.     lacosamide 100 mg Tab  Commonly known as: VIMPAT  Take 1 tablet (100 mg total) by mouth every 12 (twelve) hours.     lancets Misc  Commonly known as: ONETOUCH ULTRASOFT LANCETS  1 lancet by Misc.(Non-Drug; Combo Route) route 2 (two) times a day.     losartan 25 MG tablet  Commonly known as: COZAAR  Take 25 mg by mouth once daily.     metoprolol succinate 50 MG 24 hr tablet  Commonly known as: TOPROL-XL  Take " 25 mg by mouth once daily.     nitroGLYCERIN 0.4 MG SL tablet  Commonly known as: NITROSTAT  Place 1 tablet (0.4 mg total) under the tongue every 5 (five) minutes as needed for Chest pain.     pantoprazole 40 MG tablet  Commonly known as: PROTONIX  Take 1 tablet (40 mg total) by mouth once daily.     tamsulosin 0.4 mg Cap  Commonly known as: FLOMAX  Take 1 capsule (0.4 mg total) by mouth every evening.     vitamin D 1000 units Tab  Commonly known as: VITAMIN D3  Take 1,000 Units by mouth once daily.      * This list has 2 medication(s) that are the same as other medications prescribed for you. Read the directions carefully, and ask your doctor or other care provider to review them with you.       Stop taking these medications      acetaminophen 650 MG Tbsr  Commonly known as: TYLENOL  Replaced by: acetaminophen 500 MG tablet     cetirizine 10 MG tablet  Commonly known as: ZYRTEC     ciprofloxacin HCl 500 MG tablet  Commonly known as: CIPRO     docusate sodium 100 MG capsule  Commonly known as: COLACE     HYDROcodone-acetaminophen 5-325 mg per tablet  Commonly known as: NORCO     insulin lispro 100 unit/mL injection     LANTUS SOLOSTAR U-100 INSULIN SUBQ     MAGNESIUM ORAL     METAMUCIL (WITH SUGAR) 3.4 gram packet  Generic drug: psyllium husk (with sugar)     methocarbamoL 500 MG Tab  Commonly known as: ROBAXIN     ondansetron 4 MG Tbdl  Commonly known as: ZOFRAN-ODT     polycarbophil 625 mg tablet  Commonly known as: FIBERCON     sertraline 50 MG tablet  Commonly known as: ZOLOFT     sucralfate 1 gram tablet  Commonly known as: CARAFATE                Discharged Condition: fair    Disposition: Skilled Nursing Facility           Follow Up:     Patient Instructions:   No discharge procedures on file.    Laboratory/Diagnostic Data:    CBC/Anemia Labs: Coags:    Recent Labs   Lab 02/27/23  1827 02/28/23  0600 03/01/23  0522   WBC 7.26 6.66 6.12   HGB 11.8* 10.5* 10.3*   HCT 37.0* 33.3* 32.5*    204 226   MCV 90  91 89   RDW 13.9 13.9 13.8    No results for input(s): PT, INR, APTT in the last 168 hours.     Chemistries: ABG:   Recent Labs   Lab 02/27/23  1827 02/28/23  0600 03/01/23  0522    137 140   K 3.8 3.8 3.6    104 106   CO2 25 24 27   BUN 15 16 18   CREATININE 1.0 1.0 1.0   CALCIUM 9.1 8.7 8.6*   PROT 6.8  --   --    BILITOT 0.7  --   --    ALKPHOS 132  --   --    ALT 21  --   --    AST 26  --   --     No results for input(s): PH, PCO2, PO2, HCO3, POCSATURATED, BE in the last 168 hours.     POCT Glucose: HbA1c:    Recent Labs   Lab 02/28/23  0418 02/28/23  0821 02/28/23  1200 02/28/23  1658 02/28/23  2033 03/01/23  0745   POCTGLUCOSE 335* 307* 202* 88 108 125*    Hemoglobin A1C   Date Value Ref Range Status   02/28/2023 8.9 (H) 4.0 - 5.6 % Final     Comment:     ADA Screening Guidelines:  5.7-6.4%  Consistent with prediabetes  >or=6.5%  Consistent with diabetes    High levels of fetal hemoglobin interfere with the HbA1C  assay. Heterozygous hemoglobin variants (HbS, HgC, etc)do  not significantly interfere with this assay.   However, presence of multiple variants may affect accuracy.     10/05/2022 10.6 (H) 4.0 - 5.6 % Final     Comment:     ADA Screening Guidelines:  5.7-6.4%  Consistent with prediabetes  >or=6.5%  Consistent with diabetes    High levels of fetal hemoglobin interfere with the HbA1C  assay. Heterozygous hemoglobin variants (HbS, HgC, etc)do  not significantly interfere with this assay.   However, presence of multiple variants may affect accuracy.     04/05/2022 9.7 (H) 4.0 - 5.6 % Final     Comment:     ADA Screening Guidelines:  5.7-6.4%  Consistent with prediabetes  >or=6.5%  Consistent with diabetes    High levels of fetal hemoglobin interfere with the HbA1C  assay. Heterozygous hemoglobin variants (HbS, HgC, etc)do  not significantly interfere with this assay.   However, presence of multiple variants may affect accuracy.          Cardiac Enzymes: Ejection Fractions:    Recent Labs      02/27/23  1827   TROPONINI 0.020    EF   Date Value Ref Range Status   01/30/2023 45 % Final   01/26/2022 50 % Final          Recent Labs   Lab 02/27/23  1845   COLORU Yellow   APPEARANCEUA Hazy*   PHUR 8.0   SPECGRAV 1.020   PROTEINUA 1+*   GLUCUA Negative   KETONESU Trace*   BILIRUBINUA Negative   OCCULTUA 3+*   NITRITE Negative   LEUKOCYTESUR 3+*   RBCUA >100*   WBCUA >100*   BACTERIA Moderate*   HYALINECASTS 0       Procalcitonin (ng/mL)   Date Value   10/05/2022 0.69 (H)   04/12/2022 1.18 (H)   02/19/2022 0.93 (H)     Lactate (Lactic Acid) (mmol/L)   Date Value   02/27/2023 1.2   02/27/2023 1.1   10/12/2022 2.2   10/12/2022 3.2 (H)   10/07/2022 2.9 (H)     BNP (pg/mL)   Date Value   03/09/2022 568 (H)   02/19/2022 481 (H)   01/25/2022 658 (H)   02/01/2019 60   11/27/2018 33     Sed Rate   Date Value   02/19/2022 92 mm/Hr (H)   06/01/2007 3 mm/hr     D-Dimer (mg/L FEU)   Date Value   03/07/2022 0.43   03/05/2022 0.50 (H)   03/03/2022 0.50 (H)   03/01/2022 0.41   02/27/2022 0.37   02/25/2022 0.47   02/21/2022 0.50 (H)   02/19/2022 0.84 (H)     Ferritin (ng/mL)   Date Value   03/07/2022 334 (H)   03/05/2022 354 (H)   03/03/2022 300   03/01/2022 274   02/27/2022 312 (H)   02/25/2022 337 (H)   02/21/2022 481 (H)   02/19/2022 564 (H)     LD (U/L)   Date Value   03/07/2022 204   03/05/2022 188   03/03/2022 175   03/01/2022 179   02/27/2022 217   02/25/2022 279 (H)   02/21/2022 294 (H)   02/19/2022 318 (H)     Troponin I (ng/mL)   Date Value   02/27/2023 0.020   10/05/2022 0.015   06/30/2022 0.014   04/13/2022 0.074 (H)   04/12/2022 0.064 (H)   04/11/2022 0.021   04/05/2022 0.026   03/05/2022 0.019     CPK (U/L)   Date Value   04/12/2022 47   02/19/2022 95   01/13/2022 271 (H)   10/21/2011 188   01/08/2008 140   06/01/2007 83   05/21/2007 403 (H)   04/30/2007 293 (H)     Results for orders placed or performed in visit on 05/05/14   Vitamin D   Result Value Ref Range    Vit D, 25-Hydroxy 24 (L) 30 - 96 ng/mL      SARS-CoV2 (COVID-19) Qualitative PCR (no units)   Date Value   10/14/2022 Not Detected   10/11/2022 Not Detected   05/17/2022 Not Detected   05/03/2022 Not Detected   03/21/2022 Not Detected   03/17/2022 Not Detected   03/14/2022 Not Detected   03/08/2022 Not Detected     POC Rapid COVID (no units)   Date Value   04/05/2022 Negative   02/17/2022 Positive (A)   01/25/2022 Negative   01/12/2022 Negative   01/09/2022 Negative       Microbiology labs for the last week  Microbiology Results (last 7 days)       Procedure Component Value Units Date/Time    Urine culture [[id number]] Collected: 02/27/23 1845    Order Status: Completed Specimen: Urine Updated: 03/01/23 0206     Urine Culture, Routine No growth    Narrative:      Specimen Source->Urine              Reviewed and noted in plan where applicable- Please see chart for full lab data.    Lines/Drains:       Peripheral IV - Single Lumen 02/27/23 1828 20 G Anterior;Left Forearm (Active)   Site Assessment Clean;Dry;Intact 02/28/23 2128   Extremity Assessment Distal to IV No abnormal discoloration;No redness;No swelling;No warmth 02/28/23 2128   Line Status Flushed;Saline locked 02/28/23 2128   Dressing Status Clean;Dry;Intact 02/28/23 2128   Dressing Intervention Integrity maintained 02/28/23 2128   Site Change Due 03/03/23 02/28/23 0815   Reason Not Rotated Not due 02/28/23 0815   Number of days: 1       Imaging  ECG Results              EKG 12-lead (Final result)  Result time 02/28/23 12:42:51      Final result by Interface, Lab In [facility] (02/28/23 12:42:51)               Narrative:    Test Reason : R41.82,    Vent. Rate : 064 BPM     Atrial Rate : 064 BPM     P-R Int : 148 ms          QRS Dur : 108 ms      QT Int : 458 ms       P-R-T Axes : 000 051 108 degrees     QTc Int : 472 ms    Baseline Artifact  Baseline wander  Normal sinus rhythm  T wave abnormality, consider lateral ischemia  Prolonged QT  Abnormal ECG  When compared with ECG of 24-OCT-2022  19:32,  Premature ventricular complexes are no longer Present  Confirmed by Arik Enrique MD ([id number]) on 2/28/2023 12:42:44 PM    Referred By: [email or web address]   SELF           Confirmed By:Arik Enrique MD                                  Results for orders placed during the hospital encounter of 01/30/23    Echo    Interpretation Summary  · The left ventricle is normal in size with mildly decreased systolic function.  · The estimated ejection fraction is 45%.  · There are segmental left ventricular wall motion abnormalities.  · There is abnormal septal wall motion.  · Grade I left ventricular diastolic dysfunction.  · Normal right ventricular size with normal right ventricular systolic function.  · Mild mitral regurgitation.  · Normal central venous pressure (3 mmHg).  · The estimated PA systolic pressure is 17 mmHg.      X-Ray Clavicle Right  Narrative: EXAMINATION:  XR CLAVICLE RIGHT    CLINICAL HISTORY:  fx;    TECHNIQUE:  Two views of the right clavicle were performed.    COMPARISON:  None    FINDINGS:  The right clavicle demonstrates minimal cortical irregularity distally near the acromioclavicular joint.  There is no well define linear fracture.  The configuration of the distal clavicle is different compared to prior AP chest radiograph and is suspicious for nondisplaced fracture.  The remainder of the visualized osseous structures and soft tissues appear normal.  Impression: Nondisplaced fracture of the distal right clavicle is suspected.    Electronically signed by: Kaitlyn Antoine MD  Date:    02/28/2023  Time:    15:18      Imaging:  Imaging Results              CT Abdomen Pelvis  Without Contrast (Final result)  Result time 02/27/23 22:09:06      Final result by Rafi Rodriguez MD (02/27/23 22:09:06)               Impression:      1.  Markedly distended rectum containing a large stool ball with associated rectal wall thickening and mild adjacent fat stranding.  Findings are suggestive of fecal impaction with  possible stercoral colitis.    2.  Significant volume retained stool throughout the remaining colon suggesting constipation.    3.  New left-sided pleural effusion with adjacent atelectasis and/or consolidation.    4.  Nonspecific bilateral perinephric edema, left greater than right.  No evidence of hydronephrosis.  Correlation with urinalysis to infectious process advised.    5.  Additional stable findings as above.      Electronically signed by: Rafi Rodriguez MD  Date:    02/27/2023  Time:    22:09             Narrative:    EXAMINATION:  CT ABDOMEN PELVIS WITHOUT CONTRAST    CLINICAL HISTORY:  Nausea/vomiting;    TECHNIQUE:  Low dose axial images, sagittal and coronal reformations were obtained from the lung bases to the pubic symphysis without IV contrast.  Oral contrast was not administered.    COMPARISON:  CT 10/05/2022    FINDINGS:  Please note image quality is degraded by streak artifact throughout the abdomen relating to the patient's upper extremities.    There are advanced emphysematous change of the visualized lung bases.  There is a left-sided pleural effusion with adjacent atelectasis and/or consolidation.  There is no significant pericardial effusion.    The liver is unremarkable in appearance on this limited non-contrast examination.  There is possible biliary sludge within the gallbladder lumen.  No significant gallbladder wall thickening or pericholecystic inflammatory change appreciated.  There is no intra-or extrahepatic biliary ductal dilatation.    The stomach, spleen, pancreas, and adrenal glands appear within normal limits for noncontrast technique.    The kidneys are normal in size and location.  There are punctate bilateral nonobstructing renal calculi.  There is no hydronephrosis or definite obstructing ureteral calculus.  There are bilateral well-circumscribed renal hypodensities, the largest of which appear compatible with simple cysts, similar to prior examination.  There is  nonspecific bilateral perinephric edema, left more so than right.  Urinary bladder demonstrates mild posterior wall thickening likely reactive secondary to adjacent distended rectum, similar to prior examination.  Prostate is unremarkable.    The abdominal aorta is normal in course and caliber with moderate atherosclerotic calcification along its course.  There is no retroperitoneal hematoma.    There is no evidence to suggest small bowel obstruction.  There is a significant volume of retained stool throughout the colon suggesting constipation.  The rectum is significantly distended measuring 10.0 cm in AP diameter with large retained stool ball.  There is circumferential rectal wall thickening as well as mild adjacent perirectal fat stranding which may relate to stercoral colitis.  The appendix appears within normal limits.  There is no ascites, portal venous gas, or free intraperitoneal air identified.  There is no new bulky lymph node enlargement identified.    Osseous structures demonstrate a L1 compression fracture status post prior vertebroplasty.  There is a stable mild superior endplate deformity the T12 vertebral body.  There are degenerative change of the remaining visualized thoracolumbar spine.  The extraperitoneal soft tissues are unremarkable.                                     CT Cervical Spine Without Contrast (Final result)  Result time 02/27/23 22:02:50      Final result by Sourav Mathis MD (02/27/23 22:02:50)               Impression:      1. No acute abnormality.  2. Multilevel chronic degenerative changes.  3. Diffuse wall thickening of the upper esophagus, similar to the prior study could represent esophagitis.  Esophagram may be helpful.      Electronically signed by: Sourav Mathis  Date:    02/27/2023  Time:    22:02             Narrative:    EXAMINATION:  CT CERVICAL SPINE WITHOUT CONTRAST    CLINICAL HISTORY:  Neck trauma (Age >= 65y);    TECHNIQUE:  Low dose axial CT images through the  cervical spine, with sagittal and coronal reformations.  Contrast was not administered.    COMPARISON:  03/25/2022    FINDINGS:  No acute fractures of the cervical spine.  Minimal grade 1 spondylolisthesis of C2 on C3, C3 on C4 and C4 on C5.    Moderate facet arthropathy bilaterally at C2-3, C3-4 and C4-5.    Moderate disc space narrowing at C6-7 mild narrowing at C5-6.    Central canal is adequately maintained.    Severe foraminal narrowing at C3-4 on the left, C5-6 on the right, C6-7 on the left    Limited evaluation of the intraspinal contents demonstrates no hematoma or mass.Paraspinal soft tissues exhibit no acute abnormalities.    There is diffuse wall thickening of the upper esophagus could be associated with esophagitis.  This is similar to the prior study.  Esophagram may be helpful.                                     CT Head Without Contrast (Final result)  Result time 02/27/23 21:55:11      Final result by Sourav Mathis MD (02/27/23 21:55:11)               Impression:      1. No acute intracranial process.  2. Involutional changes with chronic microvascular ischemic changes.  Remote right frontal cortical infarct with residual encephalomalacia.  Remote left cerebellar lacunar infarct also.      Electronically signed by: Sourav Mathis  Date:    02/27/2023  Time:    21:55             Narrative:    EXAMINATION:  CT HEAD WITHOUT CONTRAST    CLINICAL HISTORY:  Head trauma, minor (Age >= 65y);    TECHNIQUE:  Low dose axial CT images obtained throughout the head without intravenous contrast. Sagittal and coronal reconstructions were performed.    COMPARISON:  10/05/2022    FINDINGS:  Intracranial compartment:    No midline shift or hydrocephalus.  No extra-axial blood or fluid collections.    Encephalomalacia of the right frontal cortex consistent with remote infarction.  No significant change.    Moderate involutional changes and chronic microvascular ischemic changes in the periventricular white  matter.    Remote lacunar infarction of the left lateral cerebellum is unchanged.  No parenchymal mass, hemorrhage, edema or major vascular distribution infarct.    Skull/extracranial contents (limited evaluation): No fracture. Mastoid air cells and paranasal sinuses are essentially clear.    Motion artifact.                                     X-Ray Humerus 2 View Right (Final result)  Result time 02/27/23 21:00:22   Procedure changed from X-Ray Humerus 2 View Left     Final result by Camacho Leon MD (02/27/23 21:00:22)               Impression:      As above.      Electronically signed by: Camacho Leon MD  Date:    02/27/2023  Time:    21:00             Narrative:    EXAMINATION:  XR HUMERUS 2 VIEW RIGHT    CLINICAL HISTORY:  FALL;  Unspecified fall, initial encounter    TECHNIQUE:  AP and lateral views right humerus    COMPARISON:  Chest radiograph, right forearm, right elbow and right shoulder series same day    FINDINGS:  Majority of the humeral head and upper shoulder are excluded from field of view limiting evaluation of the proximal humerus and shoulder.  Refer to right shoulder series and chest radiograph same date for further details.    Generalized osteopenia.  Remainder of the humerus is well aligned and intact.  No dislocation or destructive osseous process of the imaged portions of the humerus and elbow.  No subcutaneous emphysema or radiodense retained foreign body.                                     X-Ray Forearm Right (Final result)  Result time 02/27/23 19:33:33   Procedure changed from X-Ray Forearm Left     Final result by Sourav Mathis MD (02/27/23 19:33:33)               Impression:      No acute radiographic abnormality.      Electronically signed by: Sourav Mathis  Date:    02/27/2023  Time:    19:33             Narrative:    EXAMINATION:  XR FOREARM RIGHT    CLINICAL HISTORY:  FALL;Unspecified fall, initial encounter    TECHNIQUE:  AP and lateral views of the right forearm were  performed.    COMPARISON:  02/27/2023    FINDINGS:  Alignment is satisfactory.  No acute fracture, subluxation or dislocation.  Degenerative changes of the wrist with narrowing of the radiocarpal joint.  No mass or foreign body.                                     X-Ray Elbow Complete Right (Final result)  Result time 02/27/23 19:33:44   Procedure changed from X-Ray Elbow Complete Left     Final result by Sourav Mathis MD (02/27/23 19:33:44)               Impression:      No acute radiographic abnormality.      Electronically signed by: Sourav Mathis  Date:    02/27/2023  Time:    19:33             Narrative:    EXAMINATION:  XR ELBOW COMPLETE 3 VIEW RIGHT    CLINICAL HISTORY:  FALL;. Unspecified fall, initial encounter    TECHNIQUE:  AP, lateral, and oblique views of the right elbow were performed.    COMPARISON:  None    FINDINGS:  No acute fracture, subluxation or dislocation.  No mass or foreign body no significant joint effusion or hemarthrosis.                                     X-Ray Chest AP Portable (Final result)  Result time 02/27/23 19:33:54      Final result by Sourav Mathis MD (02/27/23 19:33:54)               Impression:      No acute radiographic abnormality.      Electronically signed by: Sourav Mathis  Date:    02/27/2023  Time:    19:33             Narrative:    EXAMINATION:  XR CHEST AP PORTABLE    CLINICAL HISTORY:  ams;    TECHNIQUE:  Single frontal view of the chest was performed.    COMPARISON:  10/24/2022    FINDINGS:  Cardiac silhouette is within normal limits.    A probable emphysematous changes of the lungs with mild interstitial thickening.  No large effusion.  No evidence of pneumothorax.  No mass, consolidation or focal infiltrate.  No edema is detected.    No acute rib fractures are detected.                                     X-Ray Shoulder Trauma 3 view Right (Final result)  Result time 02/27/23 19:34:28   Procedure changed from X-Ray Shoulder Trauma Left     Final result by  Sourav Mathis MD (02/27/23 19:34:28)               Impression:      There is minimal irregularity of the superior margin of the distal right clavicle which could represent a small incomplete fracture of the distal right clavicle. No prior studies for comparison. Recommend correlation with physical exam.      Electronically signed by: Sourav Mathis  Date:    02/27/2023  Time:    19:34             Narrative:    EXAMINATION:  XR SHOULDER TRAUMA 3 VIEW RIGHT    CLINICAL HISTORY:  fall;Unspecified fall, initial encounter    TECHNIQUE:  Three or four views of the right shoulder were performed.    COMPARISON:  None    FINDINGS:  Mild degenerative changes of the AC joint.  There is minimal irregularity of the superior margin of the distal right clavicle could represent a small incomplete fracture of the distal right clavicle.  No prior studies for comparison.  Recommend correlation with physical exam.    The AP view is suboptimal.    The humeral head is normally positioned.  Possible calcific tendinosis adjacent to the humeral head.  Right hemithorax is clear.                                      Follow Up Instructions:     Future Appointments   Date Time Provider Department Center   3/2/2023  2:30 PM Zoe Ordonez NP [facility]   3/15/2023  1:00 PM Marcin Steinberg NP [facility]       Discharge Instructions:  Discharge Procedure Orders   Ambulatory referral/consult to Orthopedics   Standing Status: Future   Referral Priority: Routine Referral Type: Consultation   Requested Specialty: Orthopedic Surgery   Number of Visits Requested: 1     Ambulatory referral/consult to Dermatology   Standing Status: Future   Referral Priority: Routine Referral Type: Consultation   Referral Reason: Specialty Services Required   Requested Specialty: Dermatology   Number of Visits Requested: 1     Ambulatory referral/consult to Neurology   Standing Status: Future   Referral Priority: Routine Referral Type:  Consultation   Referral Reason: Specialty Services Required   Requested Specialty: Neurology   Number of Visits Requested: 1         Total time spent on discharge 60  minutes     Sudhakar Kan MD  Attending Staff Physician  [facility]

## 2023-03-01 NOTE — PT/OT/SLP EVAL
"Physical Therapy Evaluation    Patient Name:  Kamar Muñoz   MRN:  [id number]    Recommendations:     Discharge Recommendations: nursing facility, skilled   Discharge Equipment Recommendations:  (TBD)   Barriers to discharge: Decreased caregiver support    Assessment:     Kamar Muñoz is a 77 y.o. male admitted with a medical diagnosis of UTI (urinary tract infection).  He presents with the following impairments/functional limitations: weakness, impaired endurance, impaired self care skills, impaired functional mobility, gait instability, impaired balance, decreased upper extremity function, orthopedic precautions. Pt was receptive to physical therapy treatment. Evaluation limited due to pt feeling dizzy upon sitting up and wanting to lay back down. Further functional mobility testing to be done on next visit. Pt would benefit form acute skilled physical therapy services to improve functional mobility and activity tolerance.    Rehab Prognosis: Good; patient would benefit from acute skilled PT services to address these deficits and reach maximum level of function.    Recent Surgery: * No surgery found *      Plan:     During this hospitalization, patient to be seen 3 x/week to address the identified rehab impairments via gait training, therapeutic activities, therapeutic exercises and progress toward the following goals:    Plan of Care Expires:  03/31/23    Subjective     Chief Complaint: Shoulder pain  Patient/Family Comments/goals: " I feel dizzy and I want to lay down"  Pain/Comfort:  Pain Rating 1: 8/10  Location - Side 1: Right  Location 1: shoulder  Pain Addressed 1: Distraction, Reposition  Pain Rating Post-Intervention 1: 8/10    Patients cultural, spiritual, Bahai conflicts given the current situation: no    Living Environment:  Pt resides at [facility]   Prior to admission, patients level of function was modified independent using RW .  Equipment used at home: none.  DME owned (not " currently used): none.  Upon discharge, patient will have assistance from NH staff.    Objective:     Communicated with nurse prior to session.  Patient found HOB elevated with telemetry, pulse ox (continuous)  upon PT entry to room.    General Precautions: Standard, fall  Orthopedic Precautions:RUE non weight bearing   Braces:  (UE sling)  Respiratory Status: Room air    Exams:  Not test due to pt feeling dizzy and needing to lay back down    Functional Mobility:  Bed Mobility:     Rolling Left:  moderate assistance  Scooting: moderate assistance  Supine to Sit: moderate assistance  Sit to Supine: moderate assistance  Balance: Static/dynamic sitting balance: fair -, pt required min A to sit EOB      AM-PAC 6 CLICK MOBILITY  Total Score:10       Treatment & Education:  Discussed continued POC with pt who verbalized understanding. Re-educated pt on R UE NWB precautions.    Patient left HOB elevated with all lines intact and call button in reach.    GOALS:   Multidisciplinary Problems       Physical Therapy Goals          Problem: Physical Therapy    Goal Priority Disciplines Outcome Goal Variances Interventions   Physical Therapy Goal     PT, PT/OT Ongoing, Progressing     Description: Goals to be met by: 3/15/2023     Patient will increase functional independence with mobility by performin. Supine to sit with Contact Guard Assistance  2. Rolling to Left and Right with Stand-by Assistance.  3. Sit to stand transfer with Contact Guard Assistance  4. Bed to chair transfer with Contact Guard Assistance using Derrek-walker  5. Gait  x 10 feet with Minimal Assistance using Derrek-walker.   6. 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

## 2023-03-01 NOTE — CARE UPDATE
Care Update:     No acute events overnight. Patient on the MTSU in room 85917/83776 A. Blood glucose stable. BG at and below goal on current insulin regimen (SSI, prandial, and basal insulin ). Steroid use- None.    Renal function- Normal   Vasopressors-  None       Diet diabetic [facility]; 2000 Calorie     POCT Glucose   Date Value Ref Range Status   03/01/2023 125 (H) 70 - 110 mg/dL Final   02/28/2023 108 70 - 110 mg/dL Final   02/28/2023 88 70 - 110 mg/dL Final   02/28/2023 202 (H) 70 - 110 mg/dL Final   02/28/2023 307 (H) 70 - 110 mg/dL Final   02/28/2023 335 (H) 70 - 110 mg/dL Final     Lab Results   Component Value Date    HGBA1C 8.9 (H) 02/28/2023       Endocrinology consulted for BG management.   BG goal 140-180    Diabetes Medications               glucose 4 GM chewable tablet Take 4 tablets (16 g total) by mouth as needed for Low blood sugar (50 - 70).    glucose 4 GM chewable tablet Take 6 tablets (24 g total) by mouth as needed for Low blood sugar (< 50).    insulin aspart U-100 (NOVOLOG) 100 unit/mL (3 mL) InPn pen Inject 1-10 Units into the skin before meals and at bedtime as needed (Hyperglycemia).    insulin aspart U-100 (NOVOLOG) 100 unit/mL (3 mL) InPn pen Inject 15 Units into the skin 3 (three) times daily with meals.    insulin detemir U-100 (LEVEMIR FLEXTOUCH) 100 unit/mL (3 mL) SubQ InPn pen Inject 6 Units into the skin 2 (two) times daily.    insulin glargine,hum.rec.anlog (LANTUS SOLOSTAR U-100 INSULIN SUBQ) Inject 12 Units into the skin 2 (two) times a day.    insulin lispro 100 unit/mL injection Inject 4 Units into the skin.    JANUVIA 50 mg Tab Take 100 mg by mouth.          Hospital Medications    BG checks AC/HS           dextrose 40 % gel 15,000 mg 15,000 mg, Oral, As needed (PRN)    dextrose 40 % gel 30,000 mg 30,000 mg, Oral, As needed (PRN)    glucagon (human recombinant) injection 1 mg 1 mg, Intramuscular, As needed (PRN), Turn patient on their side, give IM, and NOTIFY MD  IMMEDIATELY.<BR><BR>Feed the patient as soon as patient awakens and is able to swallow.    glucose chewable tablet 16 g 16 g, Oral, As needed (PRN), (16 grams = #  four 4gm glucose tablets)    insulin aspart U-100 pen 0-5 Units 0-5 Units, Subcutaneous, Before meals &amp; nightly PRN, **LOW CORRECTION DOSE**<BR>Blood Glucose<BR>mg/dL                  Pre-meal                2200<BR>151-200                0 unit                      0 unit<BR>201-250                2 units                    1 unit<BR>251-300                3 units                    1 unit<BR>301-350                4 units                    2 units<BR>&gt;350                     5 units                    3 units<BR>Administer subcutaneously if needed at times designated by monitoring schedule. <BR>DO NOT HOLD correction dose insulin for patients who are  NPO.<BR>&quot;HIGH ALERT MEDICATION&quot; - Administer with meals or TF/TPN.    insulin aspart U-100 pen 5 Units 5 Units, Subcutaneous, 3 times daily with meals, Administer subcutaneously with meals. HOLD prandial (mealtime) insulin if patient is NPO, unable to eat, or if Blood Glucose less than 70 mg/dL.<BR><BR>If patient ate prior to BG check, administer scheduled Novolog only (do not cover with correction dose at this time).<BR>&quot;HIGH ALERT MEDICATION&quot; - Administer with meals or TF/TPN.    insulin detemir U-100 pen 12 Units 12 Units, Subcutaneous, Daily, DO NOT HOLD basal insulin when patient is NPO.<BR>&quot;HIGH ALERT MEDICATION&quot;    SITagliptin phosphate tablet 50 mg 50 mg, Oral, Daily              ** Please notify Endocrine for any change and/or advance in diet**  ** Please call Endocrine for any BG related issues **    Discharge Planning:   - Recommend resuming home regimen Levemir 6 units BID and Novolog 5 units plus SSI LDC (150/50) and Januvia 50mg QD.       Cresencio Duke DNP, FNP-C  Department of Endocrinology  Inpatient Glycemic Management

## 2023-03-01 NOTE — ASSESSMENT & PLAN NOTE
PT/OT evaluation  3/1    discussed with daughter Basia Bob [phone]  about risks of continuing anticoagulation with recurrent falls. s/p coronary stents 01/22. Daughter agreeable to hold plavix and eliquis for now. continue ASA. she is OK with patient returning back to [facility].

## 2023-03-01 NOTE — PLAN OF CARE
[facility] ([place] [address])  Initial Discharge Assessment       Primary Care Provider: Basim Guerrero MD    Admission Diagnosis: UTI (urinary tract infection) [N39.0]  Fall [W19.XXXA]  AMS (altered mental status) [R41.82]    Admission Date: 2/27/2023  Expected Discharge Date: 3/1/2023         Payor: MEDICARE / Plan: MEDICARE PART A & B / Product Type: Government /     Extended Emergency Contact Information  Primary Emergency Contact: Basia Herrera  Mobile Phone: [phone]  Relation: Daughter  Secondary Emergency Contact: Marisa Sampson  Mobile Phone: [phone]  Relation: Daughter  Preferred language: English   needed? No    Discharge Plan A: (P) Return to nursing home  Discharge Plan B: (P) Return to Nursing Home      [id number] - [place]  [address] [facility]  [address]  [place] [address]  Phone: [phone] Fax: [phone]    [facility]  [address] [facility] [address]  [facility] [address]  Phone: [phone] Fax: [phone]    [id number]  [place] [address] AT [address] [address]  Phone: [phone] Fax: [phone]      Initial Assessment (most recent)       Adult Discharge Assessment - 03/01/23 1056          Discharge Assessment    Assessment Type Discharge Planning Assessment (P)      Confirmed/corrected address, phone number and insurance Yes (P)      Confirmed Demographics Correct on Facesheet (P)      Source of Information patient (P)      Does patient/caregiver understand observation status Yes (P)      Communicated ALLIE with patient/caregiver Yes (P)      Reason For Admission altered mental status (P)      People in Home alone (P)      Facility Arrived From: [facility] (P)      Do you expect to return to your current living situation? Yes (P)      Do you have help at home or someone to help you  manage your care at home? Yes (P)      Who are your caregiver(s) and their phone number(s)? Basia Herrera, daughter [phone] (P)      Prior to hospitilization cognitive status: Alert/Oriented (P)      Current cognitive status: Alert/Oriented (P)      Walking or Climbing Stairs ambulation difficulty, requires equipment (P)      Mobility Management walker, wheelchair (P)      Dressing/Bathing bathing difficulty, requires equipment (P)      Equipment Currently Used at Home walker, rolling;wheelchair (P)      Readmission within 30 days? No (P)      Patient currently being followed by outpatient case management? No (P)      Do you currently have service(s) that help you manage your care at home? No (P)      Is the pt/caregiver preference to resume services with current agency No (P)      Do you take prescription medications? Yes (P)      Do you have prescription coverage? Yes (P)      Coverage medicaid (P)      Do you have any problems affording any of your prescribed medications? No (P)      Is the patient taking medications as prescribed? yes (P)      How do you get to doctors appointments? car, drives self;family or friend will provide (P)      Are you on dialysis? No (P)      Do you take coumadin? No (P)      Discharge Plan A Return to nursing home (P)      Discharge Plan B Return to Nursing Home (P)                       SW contacted patient son to assist with the dc planning assessment. Per son, patient is residing at [facility] and will be returning back to this facility today. Patient is not on dialysis and does use equipment in regard to wheelchair and rolling walker.SW will arrange transport today when patient dc back to NH.      PCP  Deysi Sanderson MD  [phone]    Emergency    Basia Herrera, daughter  [phone]      Tiffany Obando JASIEL  [facility]   [id number]

## 2023-03-01 NOTE — PLAN OF CARE
[facility] ([facility])  Discharge Final Note    Primary Care Provider: Basim Guerrero MD    Expected Discharge Date: 3/1/2023    Final Discharge Note (most recent)       Final Note - 03/01/23 1536          Final Note    Assessment Type Final Discharge Note (P)      Anticipated Discharge Disposition longterm Nursing Home (P)                      Important Message from Medicare         Patient is discharging today back [facility].  has arranged transport for 5:00pm Nurse call report to [phone]. SW will inform patient family of the time.       Future Appointments   Date Time Provider Department Center   3/2/2023  2:30 PM Zoe Ordonez NP [facility]   3/15/2023  1:00 PM Marcin Steinberg NP [facility]TH[facility], [place]  [facility]   [id number]

## 2023-03-01 NOTE — PLAN OF CARE
Problem: Physical Therapy  Goal: Physical Therapy Goal  Description: Goals to be met by: 3/15/2023     Patient will increase functional independence with mobility by performin. Supine to sit with Contact Guard Assistance  2. Rolling to Left and Right with Stand-by Assistance.  3. Sit to stand transfer with Contact Guard Assistance  4. Bed to chair transfer with Contact Guard Assistance using Derrek-walker  5. Gait  x 10 feet with Minimal Assistance using Derrek-walker.   6. Lower extremity exercise program x20 reps per handout, with independence    Outcome: Ongoing, Progressing

## 2023-03-01 NOTE — ASSESSMENT & PLAN NOTE
Patient has elevated white blood cell count on urinalysis.  Will continue Rocephin and follow up on urine cultures  3/1 UC no growth. ceftriaxone discontinued.

## 2023-03-01 NOTE — PLAN OF CARE
Problem: Occupational Therapy  Goal: Occupational Therapy Goal  Description: Goals to be met by: 03-01-23     Patient will increase functional independence with ADLs by performing:    UE Dressing with Minimal Assistance.  LE Dressing with Minimal Assistance.  Grooming while standing at sink with Stand-by Assistance.  Toileting from bedside commode with Minimal Assistance for hygiene and clothing management.   Stand pivot transfers with Stand-by Assistance with HW  Toilet transfer to bedside commode with Stand-by Assistance.  Pt. To be able to recall orthopedic precautions to RUE with 100% accuracy    Outcome: Ongoing, Progressing

## 2023-03-01 NOTE — ASSESSMENT & PLAN NOTE
Patient's last A1c was 10.6.  Will keep him on sliding scale and a.c.  2/28  Patient's FSGs are not controlled on current hypoglycemics.   Last A1c reviewed-   Lab Results   Component Value Date    HGBA1C 8.9 (H) 02/28/2023    HGBA1C 10.6 (H) 10/05/2022    HGBA1C 9.7 (H) 04/05/2022     Will hold PO hypoglycemics and will start correctional scale insulin  Most recent fingerstick glucose reviewed-   Recent Labs   Lab 02/28/23  1200 02/28/23  1658 02/28/23 2033 03/01/23  0745   POCTGLUCOSE 202* 88 108 125*     currently on   Antihyperglycemics (From admission, onward)    Start     Stop Route Frequency Ordered    03/01/23 0900  insulin detemir U-100 pen 12 Units         -- SubQ Daily 02/28/23 1118    02/28/23 1403  insulin aspart U-100 pen 0-5 Units         -- SubQ Before meals & nightly PRN 02/28/23 1303    02/28/23 1315  SITagliptin phosphate tablet 50 mg         -- Oral Daily 02/28/23 1311    02/28/23 1130  insulin aspart U-100 pen 5 Units         -- SubQ 3 times daily with meals 02/28/23 1118      2/28 Glucose in 300s.   A1c was 10.6. endocrine consulted

## 2023-03-01 NOTE — PLAN OF CARE
[facility]     Department of Hospital Medicine     [address] [place] [address]     [phone] after hours  [phone] fax       NURSING HOME ORDERS    Patient Name: Kamar Muñoz  YOB: [birth year]    Admit to Nursing Home:  Regular Bed Diagnoses:  Active Hospital Problems    Diagnosis  POA    *UTI (urinary tract infection) [N39.0]  Unknown    Scalp lesion [L98.9]  Yes    Partial seizures [R56.9]  Yes    Closed nondisplaced fracture of acromial end of right clavicle [S42.034A]  Yes    Anemia [D64.9]  Yes    Constipation [K59.00]  Yes    Frequent falls [R29.6]  Not Applicable    Orthostatic hypotension [I95.1]  Yes    Dyslipidemia associated with type 2 diabetes mellitus [E11.69, E78.5]  Yes    Encephalopathy, metabolic [G93.41]  Yes    Coronary artery disease [I25.10]  Yes    Paroxysmal atrial fibrillation [I48.0]  Yes     Chronic    Type 2 diabetes mellitus with hyperglycemia, with long-term current use of insulin [E11.65, Z79.4]  Not Applicable     Chronic    Essential hypertension [I10]  Yes     Chronic      Resolved Hospital Problems    Diagnosis Date Resolved POA    Stented coronary artery [Z95.5] 02/28/2023 Not Applicable       Patient is homebound due to:  UTI (urinary tract infection)    Allergies:  Review of patient's allergies indicates:   Allergen Reactions    Iodine      Other reaction(s): swelling  Other reaction(s): Itching  Other reaction(s): Rash / IVP       Vitals:       Every shift (Skilled Nursing patients)    Diet: cardiac diet and diabetic diet: 2000 calorie                    Acitivities:    Sling for comfort Right upper extremity  - No weight beraring Right upper extremity, begin gentle ROM in 2 weeks    - Up in a chair each morning as tolerated  - Ambulate with assistance to bathroom  - Scheduled walks once each shift (every 8 hours)  - May ambulate independently  - May use walker, cane, or self-propelled  wheelchair       - Weight bearing: as tolerated      LABS:  Per facility protocol    Nursing Precautions:      - Aspiration precautions:             - Total assistance with meals            -  Upright 90 degrees befor during and after meals             -  Suction at bedside          - Fall precautions per nursing home protocol   - Seizure precaution per long-term protocol   - Decubitus precautions:        -  for positioning   - Pressure reducing foam mattress   - Turn patient every two hours. Use wedge pillows to anchor patient    CONSULTS:      Physical Therapy to evaluate and treat     Occupational Therapy to evaluate and treat          MISCELLANEOUS CARE:       Routine Skin for Bedridden Patients:  Apply moisture barrier cream to all    skin folds and wet areas in perineal area daily and after baths and                           all bowel movements.    Wound care    Altered Skin Integrity  Groin-  Keep clean and dry, WC recommends no diaper, if pt insist diaper can be applied but not secured/fastened.   Apply Traid to groin/scrotum and excoriated areas    Right posterior elbow abrasions  -Keep covered with Mepilex Border Foam every 5 days    sacral spine -  apply sacral foam border change PRN daily  Waffle overlay to correct air amount to assure patient is immersed       DIABETES CARE:    Check blood sugar:       Fingerstick blood sugar AC and HS      Report CBG < 60 or > 400 to physician.                                          Insulin Sliding Scale          Glucose  Novolog Insulin Subcutaneous        0 - 60   Orange juice or glucose tablet, hold insulin      No insulin   201-250  2 units   251-300  4 units   301-350  6 units   351-400  8 units   >400   10 units then call physician      Medications: Discontinue all previous medication orders, if any. See new list below.        Medication List        Start taking these medications      acetaminophen 500 MG tablet  Commonly known as:  "TYLENOL  Take 2 tablets (1,000 mg total) by mouth every 8 (eight) hours.  Replaces: acetaminophen 650 MG Tbsr     polyethylene glycol 17 gram/dose powder  Commonly known as: GLYCOLAX  Use cap to measure out (17 g) mix with a liquid and take by mouth once daily.            Change how you take these medications      insulin aspart U-100 100 unit/mL (3 mL) Inpn pen  Commonly known as: NovoLOG  Inject 5 Units into the skin 3 (three) times daily with meals.  What changed:   how much to take  Another medication with the same name was removed. Continue taking this medication, and follow the directions you see here.     pen needle, diabetic 30 gauge x 5/16" Ndle  Commonly known as: PEN NEEDLE  1 Units by Misc.(Non-Drug; Combo Route) route 3 (three) times daily.  What changed: Another medication with the same name was removed. Continue taking this medication, and follow the directions you see here.     SITagliptin phosphate 50 MG Tab  Commonly known as: JANUVIA  Take 1 tablet (50 mg total) by mouth once daily.  What changed:   how much to take  when to take this            Continue taking these medications      amiodarone 200 MG Tab  Commonly known as: PACERONE  Take 1 tablet (200 mg total) by mouth once daily.     atorvastatin 40 MG tablet  Commonly known as: LIPITOR  Take 1 tablet (40 mg total) by mouth once daily.     blood sugar diagnostic Strp  1 strip by Misc.(Non-Drug; Combo Route) route 2 (two) times a day.     gabapentin 100 MG capsule  Commonly known as: NEURONTIN  Take 1 capsule (100 mg total) by mouth 2 (two) times daily.     * glucose 4 GM chewable tablet  Take 4 tablets (16 g total) by mouth as needed for Low blood sugar (50 - 70).     * glucose 4 GM chewable tablet  Take 6 tablets (24 g total) by mouth as needed for Low blood sugar (< 50).     insulin detemir U-100 100 unit/mL (3 mL) Inpn pen  Commonly known as: Levemir FLEXTOUCH  Inject 6 Units into the skin 2 (two) times daily.     lacosamide 100 mg " Tab  Commonly known as: VIMPAT  Take 1 tablet (100 mg total) by mouth every 12 (twelve) hours.     lancets Misc  Commonly known as: ONETOUCH ULTRASOFT LANCETS  1 lancet by Misc.(Non-Drug; Combo Route) route 2 (two) times a day.     losartan 25 MG tablet  Commonly known as: COZAAR  Take 25 mg by mouth once daily.     metoprolol succinate 50 MG 24 hr tablet  Commonly known as: TOPROL-XL  Take 25 mg by mouth once daily.     nitroGLYCERIN 0.4 MG SL tablet  Commonly known as: NITROSTAT  Place 1 tablet (0.4 mg total) under the tongue every 5 (five) minutes as needed for Chest pain.     pantoprazole 40 MG tablet  Commonly known as: PROTONIX  Take 1 tablet (40 mg total) by mouth once daily.     vitamin D 1000 units Tab  Commonly known as: VITAMIN D3  Take 1,000 Units by mouth once daily.      * This list has 2 medication(s) that are the same as other medications prescribed for you. Read the directions carefully, and ask your doctor or other care provider to review them with you.       Stop taking these medications      acetaminophen 650 MG Tbsr  Commonly known as: TYLENOL  Replaced by: acetaminophen 500 MG tablet     cetirizine 10 MG tablet  Commonly known as: ZYRTEC     ciprofloxacin HCl 500 MG tablet  Commonly known as: CIPRO     docusate sodium 100 MG capsule  Commonly known as: COLACE     HYDROcodone-acetaminophen 5-325 mg per tablet  Commonly known as: NORCO     insulin lispro 100 unit/mL injection     LANTUS SOLOSTAR U-100 INSULIN SUBQ     MAGNESIUM ORAL     METAMUCIL (WITH SUGAR) 3.4 gram packet  Generic drug: psyllium husk (with sugar)     methocarbamoL 500 MG Tab  Commonly known as: ROBAXIN     ondansetron 4 MG Tbdl  Commonly known as: ZOFRAN-ODT     polycarbophil 625 mg tablet  Commonly known as: FIBERCON     sertraline 50 MG tablet  Commonly known as: ZOLOFT     sucralfate 1 gram tablet  Commonly known as: CARAFATE     tamsulosin 0.4 mg Cap  Commonly known as: FLOMAX                  _________________________________  Sudhakar Kan MD  03/01/2023

## 2023-03-01 NOTE — PLAN OF CARE
Patient will be discharging back to [facility] today.      Tiffany Obando LMSW  [facility]   [id number]

## 2023-03-01 NOTE — PT/OT/SLP EVAL
Occupational Therapy   Evaluation/tx    Name: Kamar Muñoz  MRN: [id number]  Admitting Diagnosis: UTI (urinary tract infection)  Recent Surgery: * No surgery found *      Recommendations:     Discharge Recommendations: nursing facility, skilled  Discharge Equipment Recommendations:   (TBD)  Barriers to discharge:  None    Assessment:     Kamar Muñoz is a 77 y.o. male with a medical diagnosis of UTI (urinary tract infection).  He presents with deficits in functional mobility and self-care tasks. Pt. Required extensive assist on this date for mobility and ADL tasks and is significantly below PLOF.  Pt. Also with limitations due to orthopedic precautions for R UE. Pain noted in Right shoulder with mobility. Performance deficits affecting function: weakness, impaired endurance, impaired self care skills, impaired functional mobility, gait instability, pain, decreased lower extremity function, decreased upper extremity function, decreased ROM, orthopedic precautions.      Rehab Prognosis: Good; patient would benefit from acute skilled OT services to address these deficits and reach maximum level of function.       Plan:     Patient to be seen 3 x/week to address the above listed problems via self-care/home management, therapeutic activities, therapeutic exercises, neuromuscular re-education  Plan of Care Expires: 03/31/23  Plan of Care Reviewed with: patient    Subjective     Chief Complaint: Pain in shoulder  Patient/Family Comments/goals: to get better and bigger sling     Occupational Profile:  Living Environment: Pt. Resides in [facility]  Previous level of function: pt. Required some assist OOB with RW and ambulated with SBA and w/c follow. Pt. Was receiving therapy services but recently services were reduced. Pt. Reported dressing himself but required assist for showers.   Roles and Routines: NH resident, retired fire juancarlos, partial caretaker of self  Equipment Used at Home: walker, rolling,  wheelchair  Assistance upon Discharge: NH staff     Pain/Comfort:  Pain Rating 1: 8/10  Location - Side 1: Right  Location 1: shoulder  Pain Addressed 1: Reposition, Distraction  Pain Rating Post-Intervention 1: 8/10    Patients cultural, spiritual, Anabaptism conflicts given the current situation: no    Objective:     Communicated with: nurse prior to session.  Patient found supine with telemetry, pulse ox (continuous) upon OT entry to room.pt. feeding self breakfast with RUE and sling not on.     General Precautions: Standard, fall, diabetic  Orthopedic Precautions: RUE non weight bearing (sling for comfort and Begin gentle ROM in 2 weeks per MSD note)  Braces: UE Sling (one at eval too small requested larger sling for pt.)  Respiratory Status: Room air    Occupational Performance:    Bed Mobility:    Patient completed Rolling/Turning to Left with  minimum assistance with vc's not to utilize RUE to pull on rail or push through  Patient completed Scooting/Bridging with moderate assistance  Patient completed Supine to Sit with moderate assistance  Patient completed Sit to Supine with moderate assistance    Functional Mobility/Transfers:  Patient completed Sit <> Stand Transfer with moderate assistance  with  no assistive device  x 2 trials from EOB for pt. To be cleaned  Functional Mobility: Not tested    Activities of Daily Living:  Lower Body Dressing: total assistance to don socks  Toileting: total assistance for cleaning and brief management    Cognitive/Visual Perceptual:  Cognitive/Psychosocial Skills:     -       Oriented to: Person, Place, Time, and Situation   -       Follows Commands/attention:Follows multistep  commands  -       Communication: clear/fluent  -       Memory: No Deficits noted  -       Safety awareness/insight to disability: intact   -       Mood/Affect/Coping skills/emotional control: Appropriate to situation  Visual/Perceptual:      -Intact .    Physical Exam:  Balance: -       sit: good;  stand: Mod A  Postural examination/scapula alignment:    -       Rounded shoulders  -       Forward head  -       Posterior pelvic tilt  Dominant hand: -       right  Upper Extremity Range of Motion:     -       Right Upper Extremity: Deficits: due to ROM restrictions at shoulder  -       Left Upper Extremity: WFL but recent RTC sx     AMPA 6 Click ADL:  [facility] Total Score: 14    Treatment & Education:  Pt. Educated on role of OT and pOC  Pt. Educated on safety with transfers as well as RUE precautions    Patient left left sidelying with all lines intact, call button in reach, and nurse present    GOALS:   Multidisciplinary Problems       Occupational Therapy Goals          Problem: Occupational Therapy    Goal Priority Disciplines Outcome Interventions   Occupational Therapy Goal     OT, PT/OT Ongoing, Progressing    Description: Goals to be met by: 03-01-23     Patient will increase functional independence with ADLs by performing:    UE Dressing with Minimal Assistance.  LE Dressing with Minimal Assistance.  Grooming while standing at sink with Stand-by Assistance.  Toileting from bedside commode with Minimal Assistance for hygiene and clothing management.   Stand pivot transfers with Stand-by Assistance with HW  Toilet transfer to bedside commode with Stand-by Assistance.  Pt. 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    Hyperlipidemia     Hypertension     Kidney stone     Migraine headache     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 08/02/2012    Paroxysmal atrial fibrillation     Pressure sore     STEMI involving right  coronary artery 01/09/2022    Type II or unspecified type diabetes mellitus with neurological manifestations, uncontrolled(250.62) 03/08/2013    Urinary tract infection          Past Surgical History:   Procedure Laterality Date    BACK SURGERY      CATARACT EXTRACTION W/  INTRAOCULAR LENS IMPLANT Right     Per Dr Romero note 11/2018    COLONOSCOPY N/A 1/28/2019    Procedure: COLONOSCOPY Suprep;  Surgeon: Anh Johnson MD;  Location: [facility] ENDO;  Service: Endoscopy;  Laterality: N/A;    ESOPHAGOGASTRODUODENOSCOPY N/A 9/15/2022    Procedure: EGD (ESOPHAGOGASTRODUODENOSCOPY);  Surgeon: Dashawn Evans MD;  Location: [facility];  Service: Endoscopy;  Laterality: N/A;    EYE SURGERY      HERNIA REPAIR      KYPHOPLASTY, SPINE, LUMBAR N/A 2/1/2023    Procedure: KYPHOPLASTY, SPINE, LUMBAR;  Surgeon: Kimberly Spicer MD;  Location: [facility] OR;  Service: Neurosurgery;  Laterality: N/A;  Ane: Gen  Blood: Type & Hold  Pos: Prone  Rad: C-arm x 2  Spec Equip: Depuy Synthes - Tray Cortez    LEFT HEART CATHETERIZATION Left 1/9/2022    Procedure: CATHETERIZATION, HEART, LEFT;  Surgeon: Will Hurst III, MD;  Location: [facility] CATH LAB/EP;  Service: Cardiology;  Laterality: Left;    renal stones      SHOULDER OPEN ROTATOR CUFF REPAIR         Time Tracking:     OT Date of Treatment: 03/01/23  OT Start Time: 0924  OT Stop Time: 0949  OT Total Time (min): 25 min    Billable Minutes:Evaluation 15  Self Care/Home Management 10    3/1/2023

## 2023-03-01 NOTE — NURSING
END OF SHIFT    Pt AAOx2 and on room air. Continued pt on rocephin q24h for UTI. Awaiting urine culture. Pt's blood sugars uncontrolled throughout shift, ranging from 80s-300s. Wound care consulted for multiple wounds - ordered placed for care instructions. Pt to wear NO diapers to help heal groin area - informed pt & family. Daughter at bedside. Son, Ed, updated via phone call. Addressed all questions/concerns. Pt resting in bed with call light in reach. VOLODYMYR Gomez

## 2023-03-01 NOTE — NURSING
Patient unable to stand or sit-up independently for accurate measurement of orthostatic hypotension.

## 2023-03-01 NOTE — PLAN OF CARE
Problem: Adult Inpatient Plan of Care  Goal: Plan of Care Review  Outcome: Ongoing, Progressing  Goal: Patient-Specific Goal (Individualized)  Outcome: Ongoing, Progressing  Goal: Optimal Comfort and Wellbeing  Outcome: Ongoing, Progressing  Goal: Readiness for Transition of Care  Outcome: Ongoing, Progressing     Problem: Impaired Wound Healing  Goal: Optimal Wound Healing  Outcome: Ongoing, Progressing     Problem: Skin Injury Risk Increased  Goal: Skin Health and Integrity  Outcome: Ongoing, Progressing

## 2023-03-01 NOTE — ASSESSMENT & PLAN NOTE
3/1 Orthostatic hypotension - lying 162/70 HR66, sitting 128/65 HR68, standing 114/58. flomax discontinued . NS bolus 250ml x 1  and reeval . flomax discontinued

## 2023-03-01 NOTE — PROGRESS NOTES
[facility] ([place] [address])  [facility]  Progress Note    Patient Name: Kamar Muñoz  MRN: [id number]  Patient Class: OP- Observation   Admission Date: 2/27/2023  Length of Stay: 0 days  Attending Physician: Sudhakar Kan MD  Primary Care Provider: Basim Guerrero MD        Subjective:     Principal Problem:UTI (urinary tract infection)        HPI:  77-year-old male with past medical history of type 2 diabetes mellitus, hypertension, paroxysmal atrial fibrillation,HFmEF, MI presents from nursing home for mechanical fall.  Patient was recently admitted in early February for lumbar fracture following a fall to the Neurosurgery Service.  Presents today with a fall appeared confused.  For me patient appeared alert and oriented x2.  He denies having any fevers chills nausea vomiting dysuria polyuria.  Skeletal workup showed questionable fracture in his right clavicle and also patient complains of having tenderness and unable to move his arm above head due to pain.  He was also found to have elevated white blood cell count with CT abdominal findings concerning for pyelonephritis.  Also he was found to have disimpacted stool on CT however on manual disimpaction he had soft stool and also had a bowel movement as per ER staff.       Overview/Hospital Course:  2/28 Glucose in 300s.   A1c was 10.6. endocrine consulted . UC pending.  continue ceftriaxone . Closed nondisplaced fracture of acromial end of right clavicle with tenderness on moving his right arm . continue sling and he needs to follow with Orthopedics as outpatient. on aspirin Plavix were discontinued on his prior admission. started  on aspirin with history of MI. Plavix and Eliquis was held with history of multiple falls, need to discuss with family regarding continuation of Eliquis  .CT abdomen pelvis - Markedly distended rectum containing a large stool ball with associated rectal wall thickening and mild adjacent fat stranding.   Findings are suggestive of fecal impaction with possible stercoral colitis. Significant volume retained stool throughout the remaining colon suggesting constipation. constipation resolved. hea has 3 BMs since admission . patient is s/p Kyphoplasty L1 - Lumbar spine on 2/1/2023 by Dr. Spicer. s/p orthopedic surgery eval -right distal third clavicle fracture, closed, intact neurovascularity. Sling for comfort RUE. NWB RUE, begin gentle ROM in 2 weeks. PT/OT Consulted   3/1 UC no growth. ceftriaxone discontinued. patient reports 6 falls in last 2  months.  discussed with daughter Basia Bob [phone]  about risks of continuing anticoagulation with recurrent falls. reports he fell and hit his head on last thursday. s/p coronary stents 01/22. Daughter agreeable to hold, ASA,  plavix and eliquis for now. Orthostatic hypotension - lying 162/70 HR66, sitting 128/65 HR68, standing 114/58. flomax discontinued . NS bolus 250ml x 1  he is OK with patient returning back to [facility].  on vimpat for partial seizures. EEG ordered.  Discharge to NH s/p wound care eval                   Review of Systems:   Pain scale:  7/10   Constitutional:  fever,  chills, headache, vision loss, hearing loss, weight loss, Generalized weakness, falls, loss of smell, loss of taste, poor appetite,  sore throat  Respiratory: cough, shortness of breath.   Cardiovascular: chest pain, dizziness, palpitations, orthopnea, swelling of feet, syncope  Gastrointestinal: nausea, vomiting, abdominal pain, diarrhea, black stool,  blood in stool, change in bowel habits  Genitourinary: hematuria, dysuria, urgency, frequency  Integument/Breast: rash,  pruritis  Hematologic/Lymphatic: easy bruising, lymphadenopathy  Musculoskeletal: arthralgias- right shoulder  , myalgias, back pain, neck pain, knee pain  Neurological: confusion, seizures, tremors, slurred speech  Behavioral/Psych:  depression, anxiety, auditory or visual hallucinations     OBJECTIVE:      Physical Exam:  Body mass index is 21.06 kg/m².    Constitutional: Appears thin built   Head: Normocephalic and atraumatic.   Neck: Normal range of motion. Neck supple.   Cardiovascular: Normal heart rate.  Regular heart rhythm.  Pulmonary/Chest: Effort normal.   Abdominal: No distension.  No tenderness  Musculoskeletal: Normal range of motion. No edema. mild tremors. Pain with ROM right shoulder   Neurological: Alert and oriented to person, place, and time.   Skin: Skin is warm and dry. + nodular skin growth scalp  Psychiatric: Normal mood and affect. Behavior is normal.                  Vital Signs  Temp: 97.8 °F (36.6 °C) (03/01/23 0800)  Pulse: 66 (03/01/23 1005)  Resp: 14 (03/01/23 1005)  BP: (Abnormal) 162/70 (03/01/23 1005)  SpO2: (Abnormal) 92 % (03/01/23 1005)     24 Hour VS Range    Temp:  [97.5 °F (36.4 °C)-97.9 °F (36.6 °C)]   Pulse:  [60-74]   Resp:  [14-19]   BP: (114-162)/(58-89)   SpO2:  [91 %-96 %]   No intake or output data in the 24 hours ending 03/01/23 1046        I/O This Shift:  No intake/output data recorded.    Wt Readings from Last 3 Encounters:   02/27/23 74.4 kg (164 lb)   01/30/23 74.4 kg (164 lb)   01/30/23 74.4 kg (164 lb)       I have personally reviewed the vitals and recorded Intake/Output     Laboratory/Diagnostic Data:    CBC/Anemia Labs: Coags:    Recent Labs   Lab 02/27/23 1827 02/28/23  0600 03/01/23  0522   WBC 7.26 6.66 6.12   HGB 11.8* 10.5* 10.3*   HCT 37.0* 33.3* 32.5*    204 226   MCV 90 91 89   RDW 13.9 13.9 13.8    No results for input(s): PT, INR, APTT in the last 168 hours.     Chemistries: ABG:   Recent Labs   Lab 02/27/23 1827 02/28/23  0600 03/01/23  0522    137 140   K 3.8 3.8 3.6    104 106   CO2 25 24 27   BUN 15 16 18   CREATININE 1.0 1.0 1.0   CALCIUM 9.1 8.7 8.6*   PROT 6.8  --   --    BILITOT 0.7  --   --    ALKPHOS 132  --   --    ALT 21  --   --    AST 26  --   --     No results for input(s): PH, PCO2, PO2, HCO3, POCSATURATED, BE  in the last 168 hours.     POCT Glucose: HbA1c:    Recent Labs   Lab 02/28/23  0418 02/28/23  0821 02/28/23  1200 02/28/23  1658 02/28/23 2033 03/01/23  0745   POCTGLUCOSE 335* 307* 202* 88 108 125*    Hemoglobin A1C   Date Value Ref Range Status   02/28/2023 8.9 (H) 4.0 - 5.6 % Final     Comment:     ADA Screening Guidelines:  5.7-6.4%  Consistent with prediabetes  >or=6.5%  Consistent with diabetes    High levels of fetal hemoglobin interfere with the HbA1C  assay. Heterozygous hemoglobin variants (HbS, HgC, etc)do  not significantly interfere with this assay.   However, presence of multiple variants may affect accuracy.     10/05/2022 10.6 (H) 4.0 - 5.6 % Final     Comment:     ADA Screening Guidelines:  5.7-6.4%  Consistent with prediabetes  >or=6.5%  Consistent with diabetes    High levels of fetal hemoglobin interfere with the HbA1C  assay. Heterozygous hemoglobin variants (HbS, HgC, etc)do  not significantly interfere with this assay.   However, presence of multiple variants may affect accuracy.     04/05/2022 9.7 (H) 4.0 - 5.6 % Final     Comment:     ADA Screening Guidelines:  5.7-6.4%  Consistent with prediabetes  >or=6.5%  Consistent with diabetes    High levels of fetal hemoglobin interfere with the HbA1C  assay. Heterozygous hemoglobin variants (HbS, HgC, etc)do  not significantly interfere with this assay.   However, presence of multiple variants may affect accuracy.          Cardiac Enzymes: Ejection Fractions:    Recent Labs     02/27/23  1827   TROPONINI 0.020    EF   Date Value Ref Range Status   01/30/2023 45 % Final   01/26/2022 50 % Final          Recent Labs   Lab 02/27/23  1845   COLORU Yellow   APPEARANCEUA Hazy*   PHUR 8.0   SPECGRAV 1.020   PROTEINUA 1+*   GLUCUA Negative   KETONESU Trace*   BILIRUBINUA Negative   OCCULTUA 3+*   NITRITE Negative   LEUKOCYTESUR 3+*   RBCUA >100*   WBCUA >100*   BACTERIA Moderate*   HYALINECASTS 0       Procalcitonin (ng/mL)   Date Value   10/05/2022 0.69  (H)   04/12/2022 1.18 (H)   02/19/2022 0.93 (H)     Lactate (Lactic Acid) (mmol/L)   Date Value   02/27/2023 1.2   02/27/2023 1.1   10/12/2022 2.2   10/12/2022 3.2 (H)   10/07/2022 2.9 (H)     BNP (pg/mL)   Date Value   03/09/2022 568 (H)   02/19/2022 481 (H)   01/25/2022 658 (H)   02/01/2019 60   11/27/2018 33     Sed Rate   Date Value   02/19/2022 92 mm/Hr (H)   06/01/2007 3 mm/hr     D-Dimer (mg/L FEU)   Date Value   03/07/2022 0.43   03/05/2022 0.50 (H)   03/03/2022 0.50 (H)   03/01/2022 0.41   02/27/2022 0.37   02/25/2022 0.47   02/21/2022 0.50 (H)   02/19/2022 0.84 (H)     Ferritin (ng/mL)   Date Value   03/07/2022 334 (H)   03/05/2022 354 (H)   03/03/2022 300   03/01/2022 274   02/27/2022 312 (H)   02/25/2022 337 (H)   02/21/2022 481 (H)   02/19/2022 564 (H)     LD (U/L)   Date Value   03/07/2022 204   03/05/2022 188   03/03/2022 175   03/01/2022 179   02/27/2022 217   02/25/2022 279 (H)   02/21/2022 294 (H)   02/19/2022 318 (H)     Troponin I (ng/mL)   Date Value   02/27/2023 0.020   10/05/2022 0.015   06/30/2022 0.014   04/13/2022 0.074 (H)   04/12/2022 0.064 (H)   04/11/2022 0.021   04/05/2022 0.026   03/05/2022 0.019     CPK (U/L)   Date Value   04/12/2022 47   02/19/2022 95   01/13/2022 271 (H)   10/21/2011 188   01/08/2008 140   06/01/2007 83   05/21/2007 403 (H)   04/30/2007 293 (H)     Results for orders placed or performed in visit on 05/05/14   Vitamin D   Result Value Ref Range    Vit D, 25-Hydroxy 24 (L) 30 - 96 ng/mL     SARS-CoV2 (COVID-19) Qualitative PCR (no units)   Date Value   10/14/2022 Not Detected   10/11/2022 Not Detected   05/17/2022 Not Detected   05/03/2022 Not Detected   03/21/2022 Not Detected   03/17/2022 Not Detected   03/14/2022 Not Detected   03/08/2022 Not Detected     POC Rapid COVID (no units)   Date Value   04/05/2022 Negative   02/17/2022 Positive (A)   01/25/2022 Negative   01/12/2022 Negative   01/09/2022 Negative       Microbiology labs for the last week  Microbiology  Results (last 7 days)       Procedure Component Value Units Date/Time    Urine culture [[id number]] Collected: 02/27/23 1845    Order Status: Completed Specimen: Urine Updated: 03/01/23 0206     Urine Culture, Routine No growth    Narrative:      Specimen Source->Urine            Reviewed and noted in plan where applicable- Please see chart for full lab data.    Lines/Drains:       Peripheral IV - Single Lumen 02/27/23 1828 20 G Anterior;Left Forearm (Active)   Site Assessment Clean;Dry;Intact 02/28/23 0215   Extremity Assessment Distal to IV No abnormal discoloration;No redness;No swelling;No warmth 02/28/23 0215   Line Status Flushed;Saline locked 02/28/23 0215   Dressing Status Clean;Dry;Intact 02/28/23 0215   Dressing Intervention Integrity maintained 02/28/23 0215   Number of days: 0       Imaging  ECG Results              EKG 12-lead (Final result)  Result time 02/28/23 12:42:51      Final result by Interface, Lab In [facility] (02/28/23 12:42:51)               Narrative:    Test Reason : R41.82,    Vent. Rate : 064 BPM     Atrial Rate : 064 BPM     P-R Int : 148 ms          QRS Dur : 108 ms      QT Int : 458 ms       P-R-T Axes : 000 051 108 degrees     QTc Int : 472 ms    Baseline Artifact  Baseline wander  Normal sinus rhythm  T wave abnormality, consider lateral ischemia  Prolonged QT  Abnormal ECG  When compared with ECG of 24-OCT-2022 19:32,  Premature ventricular complexes are no longer Present  Confirmed by Arik Enrique MD ([id number]) on 2/28/2023 12:42:44 PM    Referred By: [email or web address]   SELF           Confirmed By:Arik Enrique MD                                  Results for orders placed during the hospital encounter of 01/30/23    Echo    Interpretation Summary  · The left ventricle is normal in size with mildly decreased systolic function.  · The estimated ejection fraction is 45%.  · There are segmental left ventricular wall motion abnormalities.  · There is abnormal septal wall motion.  · Grade I left  ventricular diastolic dysfunction.  · Normal right ventricular size with normal right ventricular systolic function.  · Mild mitral regurgitation.  · Normal central venous pressure (3 mmHg).  · The estimated PA systolic pressure is 17 mmHg.      X-Ray Clavicle Right  Narrative: EXAMINATION:  XR CLAVICLE RIGHT    CLINICAL HISTORY:  fx;    TECHNIQUE:  Two views of the right clavicle were performed.    COMPARISON:  None    FINDINGS:  The right clavicle demonstrates minimal cortical irregularity distally near the acromioclavicular joint.  There is no well define linear fracture.  The configuration of the distal clavicle is different compared to prior AP chest radiograph and is suspicious for nondisplaced fracture.  The remainder of the visualized osseous structures and soft tissues appear normal.  Impression: Nondisplaced fracture of the distal right clavicle is suspected.    Electronically signed by: Kaitlyn Antoine MD  Date:    02/28/2023  Time:    15:18      Labs, Imaging, EKG and Diagnostic results from 3/1/2023 were reviewed.    Medications:  Medication list was reviewed and changes noted under Assessment/Plan.  No current facility-administered medications on file prior to encounter.     Current Outpatient Medications on File Prior to Encounter   Medication Sig Dispense Refill    amiodarone (PACERONE) 200 MG Tab Take 1 tablet (200 mg total) by mouth once daily. 90 tablet 3    atorvastatin (LIPITOR) 40 MG tablet Take 1 tablet (40 mg total) by mouth once daily. 90 tablet 3    blood sugar diagnostic Strp 1 strip by Misc.(Non-Drug; Combo Route) route 2 (two) times a day. 200 strip 6    gabapentin (NEURONTIN) 100 MG capsule Take 1 capsule (100 mg total) by mouth 2 (two) times daily. 60 capsule 11    glucose 4 GM chewable tablet Take 4 tablets (16 g total) by mouth as needed for Low blood sugar (50 - 70).  12    glucose 4 GM chewable tablet Take 6 tablets (24 g total) by mouth as needed for Low blood sugar (< 50).   "12    insulin detemir U-100 (LEVEMIR FLEXTOUCH) 100 unit/mL (3 mL) SubQ InPn pen Inject 6 Units into the skin 2 (two) times daily. 3.6 mL 0    lacosamide (VIMPAT) 100 mg Tab Take 1 tablet (100 mg total) by mouth every 12 (twelve) hours. 60 tablet 11    lancets (ONETOUCH ULTRASOFT LANCETS) Misc 1 lancet by Misc.(Non-Drug; Combo Route) route 2 (two) times a day. 200 each 6    losartan (COZAAR) 25 MG tablet Take 25 mg by mouth once daily.      metoprolol succinate (TOPROL-XL) 50 MG 24 hr tablet Take 25 mg by mouth once daily.      nitroGLYCERIN (NITROSTAT) 0.4 MG SL tablet Place 1 tablet (0.4 mg total) under the tongue every 5 (five) minutes as needed for Chest pain. 25 tablet 11    pantoprazole (PROTONIX) 40 MG tablet Take 1 tablet (40 mg total) by mouth once daily. 30 tablet 11    pen needle, diabetic (PEN NEEDLE) 30 gauge x 5/16" Ndle 1 Units by Misc.(Non-Drug; Combo Route) route 3 (three) times daily. 100 each 3    tamsulosin (FLOMAX) 0.4 mg Cap Take 1 capsule (0.4 mg total) by mouth every evening.  0    vitamin D (VITAMIN D3) 1000 units Tab Take 1,000 Units by mouth once daily.      [DISCONTINUED] acetaminophen (TYLENOL) 650 MG TbSR Take 1 tablet (650 mg total) by mouth every 6 (six) hours as needed (pain). 40 tablet 0    [DISCONTINUED] albuterol (PROVENTIL/VENTOLIN HFA) 90 mcg/actuation inhaler Inhale 2 puffs into the lungs every 4 (four) hours as needed for Wheezing (Pt states he will take it on his own. MDI placed by bedside.). Rescue      [DISCONTINUED] BD ULTRA-FINE SHORT PEN NEEDLE 31 gauge x 5/16" Ndle 3 (three) times daily.      [DISCONTINUED] blood-glucose meter,continuous (DEXCOM G5 ) Misc 1 Device by Misc.(Non-Drug; Combo Route) route once daily at 6am. 1 each 0    [DISCONTINUED] blood-glucose transmitter (DEXCOM G5 TRANSMITTER) Stephanie 1 Device by Misc.(Non-Drug; Combo Route) route once daily at 6am. 1 each 0    [DISCONTINUED] cetirizine (ZYRTEC) 10 MG tablet Take 1 tablet (10 mg total) by mouth " every evening.  0    [DISCONTINUED] ciprofloxacin HCl (CIPRO) 500 MG tablet       [DISCONTINUED] diltiaZEM (CARDIZEM CD) 120 MG Cp24 Take 1 capsule (120 mg total) by mouth once daily. 90 capsule 3    [DISCONTINUED] docusate sodium (COLACE) 100 MG capsule Take 100 mg by mouth once daily at 6am.      [DISCONTINUED] HYDROcodone-acetaminophen (NORCO) 5-325 mg per tablet Take 1 tablet by mouth every 6 (six) hours as needed for Pain. 56 tablet 0    [DISCONTINUED] insulin aspart U-100 (NOVOLOG) 100 unit/mL (3 mL) InPn pen Inject 1-10 Units into the skin before meals and at bedtime as needed (Hyperglycemia). 30 mL 3    [DISCONTINUED] insulin aspart U-100 (NOVOLOG) 100 unit/mL (3 mL) InPn pen Inject 15 Units into the skin 3 (three) times daily with meals. (Patient taking differently: Inject 5 Units into the skin 3 (three) times daily with meals.) 162 mL 0    [DISCONTINUED] insulin glargine,hum.rec.anlog (LANTUS SOLOSTAR U-100 INSULIN SUBQ) Inject 12 Units into the skin 2 (two) times a day.      [DISCONTINUED] insulin lispro 100 unit/mL injection Inject 4 Units into the skin.      [DISCONTINUED] JANUVIA 50 mg Tab Take 100 mg by mouth.      [DISCONTINUED] MAGNESIUM ORAL Take 400 mg by mouth once.      [DISCONTINUED] metFORMIN (GLUCOPHAGE) 1000 MG tablet Take 1 tablet (1,000 mg total) by mouth 2 (two) times daily with meals. 60 tablet 3    [DISCONTINUED] methocarbamoL (ROBAXIN) 500 MG Tab Take 500 mg by mouth daily as needed.      [DISCONTINUED] ondansetron (ZOFRAN-ODT) 4 MG TbDL Take 4 mg by mouth every 6 (six) hours as needed.      [DISCONTINUED] polycarbophil (FIBERCON) 625 mg tablet Take 1 tablet by mouth once daily.       [DISCONTINUED] psyllium husk, with sugar, (METAMUCIL, WITH SUGAR,) 3.4 gram packet Take 1 packet by mouth 2 (two) times daily.      [DISCONTINUED] sertraline (ZOLOFT) 50 MG tablet Take 50 mg by mouth once daily.      [DISCONTINUED] sucralfate (CARAFATE) 1 gram tablet Take 1 g by mouth 3 (three) times  daily before meals.       Scheduled Medications:  acetaminophen, 1,000 mg, Oral, Q8H  amiodarone, 200 mg, Oral, Daily  aspirin, 81 mg, Oral, Daily  atorvastatin, 40 mg, Oral, Daily  docusate sodium, 100 mg, Oral, Daily  insulin aspart U-100, 5 Units, Subcutaneous, TIDWM  insulin detemir U-100, 12 Units, Subcutaneous, Daily  metoprolol succinate, 25 mg, Oral, Daily  SITagliptin phosphate, 50 mg, Oral, Daily  sodium chloride 0.9%, 250 mL, Intravenous, Once  sucralfate, 1 g, Oral, TID AC      PRN: dextrose 10%, dextrose 10%, dextrose 10%, dextrose 10%, dextrose, dextrose, glucagon (human recombinant), glucose, insulin aspart U-100, sodium chloride 0.9%, DIPH,PERTUSS(ACELL),TET VACCINE (ADULT)(BOOSTRIX,ADACEL)  Infusions:   Estimated Creatinine Clearance: 63 mL/min (based on SCr of 1 mg/dL).    Assessment/Plan:      * UTI (urinary tract infection)    Patient has elevated white blood cell count on urinalysis.  Will continue Rocephin and follow up on urine cultures  3/1 UC no growth. ceftriaxone discontinued.      Partial seizures  on vimpat 100mg BID not started on admission.. EEG ordered.        Scalp lesion  3/1 painful nodular growth on scalp. dermatology consulted with H/o skin cancer       Constipation  2/28 CT abdomen pelvis - Markedly distended rectum containing a large stool ball with associated rectal wall thickening and mild adjacent fat stranding.  Findings are suggestive of fecal impaction with possible stercoral colitis. Significant volume retained stool throughout the remaining colon suggesting constipation.     resolved. had 4 BMs overnight    Anemia    Patient's with Normocytic anemia.. Hemoglobin stable. Etiology likely due to chronic disease .  Current CBC reviewed-  Recent Labs   Lab 02/27/23  1827 02/28/23  0600   HGB 11.8* 10.5*       Monitor CBC and transfuse if H/H drops below 7/21.       Closed nondisplaced fracture of acromial end of right clavicle    Patient also complains of having tenderness  on moving his right arm above his shoulder.  X ray right shoulder -  minimal irregularity of the superior margin of the distal right clavicle which could represent a small incomplete fracture of the distal right clavicle.   2/28    s/p orthopedic surgery eval -right distal third clavicle fracture, closed, intact neurovascularity. Sling for comfort RUE. NWB RUE, begin gentle ROM in 2 weeks. PT/OT Consulted       Frequent falls  PT/OT evaluation  3/1    discussed with daughter Basia Bob [phone]  about risks of continuing anticoagulation with recurrent falls. s/p coronary stents 01/22. Daughter agreeable to hold plavix and eliquis for now. continue ASA. she is OK with patient returning back to [facility].      Orthostatic hypotension  3/1 Orthostatic hypotension - lying 162/70 HR66, sitting 128/65 HR68, standing 114/58. flomax discontinued . NS bolus 250ml x 1  and reeval . flomax discontinued       Dyslipidemia associated with type 2 diabetes mellitus  Continue atorvastatin    Encephalopathy, metabolic  secondary to above . AAOX2. improved       Coronary artery disease    Patient has a previous history of MI. supposed to be on aspirin, Plavix, statins.  He is currently only on statins and aspirin Plavix were discontinued on his prior admission.  Will start him back on aspirin for now and continue statin    Paroxysmal atrial fibrillation  Patient has history of paroxysmal atrial fibrillation and was on rate control with Toprol, rhythm control with amiodarone and anticoagulation with Eliquis.  On prior admission when he presented with lumbar fracture his aspirin/Plavix/Eliquis was held.  Given his history of multiple falls, shared decision making needs to be addressed with the family regarding continuation of Eliquis for his anticoagulation  3/1  patient reports 6 falls in last 2  months.  discussed with daughter Basia Bob [phone]  about risks of continuing anticoagulation with recurrent  falls. s/p coronary stents 01/22. Daughter agreeable to hold plavix and eliquis for now.      Type 2 diabetes mellitus with hyperglycemia, with long-term current use of insulin  Patient's last A1c was 10.6.  Will keep him on sliding scale and a.c.  2/28  Patient's FSGs are not controlled on current hypoglycemics.   Last A1c reviewed-   Lab Results   Component Value Date    HGBA1C 8.9 (H) 02/28/2023    HGBA1C 10.6 (H) 10/05/2022    HGBA1C 9.7 (H) 04/05/2022     Will hold PO hypoglycemics and will start correctional scale insulin  Most recent fingerstick glucose reviewed-   Recent Labs   Lab 02/28/23  1200 02/28/23  1658 02/28/23  2033 03/01/23  0745   POCTGLUCOSE 202* 88 108 125*     currently on   Antihyperglycemics (From admission, onward)      Start     Stop Route Frequency Ordered    03/01/23 0900  insulin detemir U-100 pen 12 Units         -- SubQ Daily 02/28/23 1118    02/28/23 1403  insulin aspart U-100 pen 0-5 Units         -- SubQ Before meals & nightly PRN 02/28/23 1303    02/28/23 1315  SITagliptin phosphate tablet 50 mg         -- Oral Daily 02/28/23 1311    02/28/23 1130  insulin aspart U-100 pen 5 Units         -- SubQ 3 times daily with meals 02/28/23 1118        2/28 Glucose in 300s.   A1c was 10.6. endocrine consulted      Essential hypertension    Chronic, controlled.  Latest blood pressure and vitals reviewed-   Temp:  [97.8 °F (36.6 °C)-98.1 °F (36.7 °C)]   Pulse:  [61-72]   Resp:  [11-20]   BP: (129-172)/(69-80)   SpO2:  [93 %-98 %] .   Home meds for hypertension were reviewed  PRN meds if BP> 180/110 mm HG  Controlled on Metoprolol      VTE Risk Mitigation (From admission, onward)           Ordered     IP VTE HIGH RISK PATIENT  Once         02/28/23 0058     Place sequential compression device  Until discontinued         02/28/23 0058                    Discharge Planning   ALLIE:      Code Status: DNR   Is the patient medically ready for discharge?: No    Reason for patient still in hospital  (select all that apply): Treatment                     Sudhakar Kan MD  Department of Hospital Medicine   [facility] - Intensive Care ([place] [address])

## 2023-03-01 NOTE — NURSING
Patient alert and oriented x4 at this time, resting in bed.   Patient worked with PT OT today but unable to stand independently.  EEG performed.  Wounds cared for per orders.    Patient transported via stretcher back to [facility].  All belongings sent with patient.  IV removed.  Discharge paperwork reviewed with patient and sent with him.  [facility] RN called report to nursing home nurse.

## 2023-03-01 NOTE — ASSESSMENT & PLAN NOTE
Patient has history of paroxysmal atrial fibrillation and was on rate control with Toprol, rhythm control with amiodarone and anticoagulation with Eliquis.  On prior admission when he presented with lumbar fracture his aspirin/Plavix/Eliquis was held.  Given his history of multiple falls, shared decision making needs to be addressed with the family regarding continuation of Eliquis for his anticoagulation  3/1  patient reports 6 falls in last 2  months.  discussed with daughter Basia Bob [phone]  about risks of continuing anticoagulation with recurrent falls. s/p coronary stents 01/22. Daughter agreeable to hold plavix and eliquis for now.

## 2023-03-03 LAB — LACOSAMIDE: 2.1 MCG/ML (ref 1–10)

## 2023-03-06 PROBLEM — I63.442 CEREBROVASCULAR ACCIDENT (CVA) DUE TO EMBOLISM OF LEFT CEREBELLAR ARTERY: Status: RESOLVED | Noted: 2022-12-01 | Resolved: 2023-03-06

## 2023-03-06 PROBLEM — I63.411 CEREBROVASCULAR ACCIDENT (CVA) DUE TO EMBOLISM OF RIGHT MIDDLE CEREBRAL ARTERY: Status: RESOLVED | Noted: 2022-12-01 | Resolved: 2023-03-06

## 2023-03-15 ENCOUNTER — HOSPITAL ENCOUNTER (OUTPATIENT)
Dept: RADIOLOGY | Facility: HOSPITAL | Age: 78
Discharge: HOME | End: 2023-03-15
Attending: NURSE PRACTITIONER
Payer: MEDICARE

## 2023-03-15 ENCOUNTER — OFFICE VISIT (OUTPATIENT)
Dept: ORTHOPEDICS | Facility: CLINIC | Age: 78
End: 2023-03-15
Payer: MEDICARE

## 2023-03-15 DIAGNOSIS — M25.552 LEFT HIP PAIN: ICD-10-CM

## 2023-03-15 DIAGNOSIS — M25.552 LEFT HIP PAIN: Primary | ICD-10-CM

## 2023-03-15 DIAGNOSIS — S42.034D CLOSED NONDISPLACED FRACTURE OF ACROMIAL END OF RIGHT CLAVICLE WITH ROUTINE HEALING, SUBSEQUENT ENCOUNTER: Primary | ICD-10-CM

## 2023-03-15 DIAGNOSIS — M25.511 ACUTE PAIN OF RIGHT SHOULDER: ICD-10-CM

## 2023-03-15 DIAGNOSIS — M25.511 ACUTE PAIN OF RIGHT SHOULDER: Primary | ICD-10-CM
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
## 2023-03-15 PROCEDURE — 99214 OFFICE O/P EST MOD 30 MIN: CPT | Mod: S$PBB,,, | Performed by: NURSE PRACTITIONER

## 2023-03-15 PROCEDURE — 73502 X-RAY EXAM HIP UNI 2-3 VIEWS: CPT | Mod: 26,LT,, | Performed by: RADIOLOGY

## 2023-03-15 PROCEDURE — 73000 X-RAY EXAM OF COLLAR BONE: CPT | Mod: 26,RT,, | Performed by: RADIOLOGY

## 2023-03-15 PROCEDURE — 99999 PR PBB SHADOW E&M-EST. PATIENT-LVL III: CPT | Mod: PBBFAC,,, | Performed by: NURSE PRACTITIONER

## 2023-03-15 PROCEDURE — 73000 XR CLAVICLE RIGHT: ICD-10-PCS | Mod: 26,RT,, | Performed by: RADIOLOGY

## 2023-03-15 PROCEDURE — 99213 OFFICE O/P EST LOW 20 MIN: CPT | Mod: PBBFAC | Performed by: NURSE PRACTITIONER

## 2023-03-15 PROCEDURE — 99999 PR PBB SHADOW E&M-EST. PATIENT-LVL III: ICD-10-PCS | Mod: PBBFAC,,, | Performed by: NURSE PRACTITIONER

## 2023-03-15 PROCEDURE — 99214 PR OFFICE/OUTPT VISIT, EST, LEVL IV, 30-39 MIN: ICD-10-PCS | Mod: S$PBB,,, | Performed by: NURSE PRACTITIONER

## 2023-03-15 PROCEDURE — 73502 X-RAY EXAM HIP UNI 2-3 VIEWS: CPT | Mod: TC,LT

## 2023-03-15 PROCEDURE — 73000 X-RAY EXAM OF COLLAR BONE: CPT | Mod: TC,RT

## 2023-03-15 PROCEDURE — 73502 XR HIP WITH PELVIS WHEN PERFORMED, 2 OR 3 VIEWS LEFT: ICD-10-PCS | Mod: 26,LT,, | Performed by: RADIOLOGY

## 2023-03-15 NOTE — PROGRESS NOTES
Subjective:      Patient ID: Kamar Muñoz is a 77 y.o. male.    Chief Complaint: No chief complaint on file.    Kamar Muñoz is a 77 y.o. male with PMH significant for type 2 diabetes mellitus, hypertension, paroxysmal atrial fibrillation,HFmEF, and MI who is following up with Orthopedics from his hospital discharge after sustaining a mechanical fall at his nursing home.  Patient currently resides at [facility] nursing home.  He states he rolled out of his bed in hit his right shoulder.  He was diagnosed with a clavicle fracture on the right.  He was evaluated by Orthopedics in the hospital and deemed a nonsurgical candidate.  He was instructed to avoid any heavy lifting or pulling and follow up in the clinic.    Currently the patient reports intermittent pain at the right shoulder and left hip.  Denies any falls or injuries since being discharged.  He is getting therapy at the nursing facility.  He denies any numbness or tingling sensation down his right arm or his left lower leg.  He reports he is right-hand dominant. No known history of prior right shoulder injury or surgery.  Walks with the use of a walker at baseline. Does not take any anticoagulation at baseline.         Review of Systems   Musculoskeletal:         Right shoulder and left hip pain   All other systems reviewed and are negative.      Objective:            General    Vitals reviewed.  Constitutional: He is oriented to person, place, and time. He appears well-developed and well-nourished. No distress.   HENT:   Head: Normocephalic and atraumatic.   Eyes: Conjunctivae are normal. Pupils are equal, round, and reactive to light.   Neck: Neck supple.   Cardiovascular:  Intact distal pulses.            Pulmonary/Chest: Effort normal.   Neurological: He is alert and oriented to person, place, and time. He has normal reflexes.   Psychiatric: He has a normal mood and affect. His behavior is normal. Judgment and thought content normal.          Left Hip Exam     Inspection   Swelling: absent  No deformity of hip.  Bruising: absent    Range of Motion   The patient has normal left hip ROM.    Muscle Strength   The patient has normal left hip strength.       Right Shoulder Exam     Inspection/Observation   Swelling: absent  Bruising: absent  Deformity: absent    Tenderness   The patient is tender to palpation of the acromioclavicular joint and clavicle.    Other   Sensation: normal    Comments:  Patient has normal range of motion of his fingers, wrist, elbow on the right.  Range of motion of the shoulder 0-90 degrees.  He is able to bring his hand to his mouth tapped the back of his head.    Vascular Exam     Right Pulses      Radial:                    2+      Left Pulses  Dorsalis Pedis:      2+  Posterior Tibial:      2+    RADS:  X-ray of the right shoulder and left hip were obtained and personally reviewed by me.  Patient has a nondisplaced fracture at the distal 3rd of the right clavicle that appears to be stable when compared to prior x-ray.  Left hip x-ray shows degenerative joint disease no acute fracture seen.        Assessment:       Encounter Diagnoses   Name Primary?    Closed nondisplaced fracture of acromial end of right clavicle with routine healing, subsequent encounter Yes    Left hip pain     Fall, subsequent encounter           Plan:       Diagnoses and all orders for this visit:    Closed nondisplaced fracture of acromial end of right clavicle with routine healing, subsequent encounter    Left hip pain    Fall, subsequent encounter    Patient is a 77-year-old male who presents to orthopedic clinic for hospital discharge follow-up for his right clavicle fracture status post fall at the nursing facility.  X-rays taken of his right shoulder and left hip.  Clavicle fracture appears to be stable and left hip x-ray appears to be normal with the exception of osteoarthritis noted.    Recommendation is to avoid heavy lifting or pulling in  the right upper extremity for the next couple of weeks.  We will attempt to initiate weight-bearing activities in approximately 1 month's time but will start slow 2-4 lb as patient tolerates.  Patient can weight bear as tolerated to his bilateral lower extremity range of motion as tolerated to his bilateral lower extremities.    I will see the patient back in 6 weeks time at which time we will repeat x-ray of his right shoulder.    Future Appointments   Date Time Provider Department Center   4/27/2023  1:30 PM Marcin Steinberg NP [facility] ORTHO [facility]

## 2023-03-21 ENCOUNTER — TELEPHONE (OUTPATIENT)
Dept: ORTHOPEDICS | Facility: CLINIC | Age: 78
End: 2023-03-21
Payer: MEDICAID

## 2023-03-21 NOTE — TELEPHONE ENCOUNTER
----- Message from Alyssa Hurst sent at 3/21/2023  9:17 AM CDT -----  Regarding: clarification  Contact: [phone]  [facility] calling regarding Patient Advice (message) for #clarification on what pt can do with right arm. Please call

## 2023-03-21 NOTE — TELEPHONE ENCOUNTER
Called and spoke with Ms Korin boyd/[facility] in regards to having questions about pt's range of motion. Informed Ms Langley I spoke with Marcin and he OK for pt to use passive range of motion and allow to  his walker while standing. However pt cannot do any over head reaching or pulling. Ms Langley verbally understood and was satisfied.

## 2023-03-23 ENCOUNTER — HOSPITAL ENCOUNTER (INPATIENT)
Facility: HOSPITAL | Age: 78
LOS: 4 days | Discharge: HOME | DRG: 871 | End: 2023-03-27
Attending: STUDENT IN AN ORGANIZED HEALTH CARE EDUCATION/TRAINING PROGRAM | Admitting: INTERNAL MEDICINE
Payer: MEDICARE

## 2023-03-23 DIAGNOSIS — A41.9 SEPSIS, DUE TO UNSPECIFIED ORGANISM, UNSPECIFIED WHETHER ACUTE ORGAN DYSFUNCTION PRESENT: ICD-10-CM

## 2023-03-23 DIAGNOSIS — R73.9 HYPERGLYCEMIA: ICD-10-CM

## 2023-03-23 DIAGNOSIS — N17.9 AKI (ACUTE KIDNEY INJURY): Primary | ICD-10-CM

## 2023-03-23 DIAGNOSIS — E13.10 DIABETIC KETOACIDOSIS WITHOUT COMA ASSOCIATED WITH OTHER SPECIFIED DIABETES MELLITUS: ICD-10-CM

## 2023-03-23 LAB
ALBUMIN SERPL BCP-MCNC: 2.9 G/DL (ref 3.5–5.2)
ALLENS TEST: ABNORMAL
ALP SERPL-CCNC: 177 U/L (ref 55–135)
ALT SERPL W/O P-5'-P-CCNC: 28 U/L (ref 10–44)
ANION GAP SERPL CALC-SCNC: 21 MMOL/L (ref 8–16)
ANION GAP SERPL CALC-SCNC: 35 MMOL/L (ref 8–16)
ANION GAP SERPL CALC-SCNC: 8 MMOL/L (ref 8–16)
ANION GAP SERPL CALC-SCNC: 9 MMOL/L (ref 8–16)
AST SERPL-CCNC: 28 U/L (ref 10–40)
B-OH-BUTYR BLD STRIP-SCNC: 5.3 MMOL/L (ref 0–0.5)
BASOPHILS # BLD AUTO: 0.03 K/UL (ref 0–0.2)
BASOPHILS NFR BLD: 0.3 % (ref 0–1.9)
BILIRUB SERPL-MCNC: 0.3 MG/DL (ref 0.1–1)
BUN SERPL-MCNC: 24 MG/DL (ref 8–23)
BUN SERPL-MCNC: 24 MG/DL (ref 8–23)
BUN SERPL-MCNC: 26 MG/DL (ref 6–30)
BUN SERPL-MCNC: 26 MG/DL (ref 8–23)
BUN SERPL-MCNC: 28 MG/DL (ref 8–23)
CALCIUM SERPL-MCNC: 7.8 MG/DL (ref 8.7–10.5)
CALCIUM SERPL-MCNC: 8.8 MG/DL (ref 8.7–10.5)
CALCIUM SERPL-MCNC: 8.8 MG/DL (ref 8.7–10.5)
CALCIUM SERPL-MCNC: 9 MG/DL (ref 8.7–10.5)
CHLORIDE SERPL-SCNC: 106 MMOL/L (ref 95–110)
CHLORIDE SERPL-SCNC: 107 MMOL/L (ref 95–110)
CHLORIDE SERPL-SCNC: 108 MMOL/L (ref 95–110)
CHLORIDE SERPL-SCNC: 116 MMOL/L (ref 95–110)
CHLORIDE SERPL-SCNC: 99 MMOL/L (ref 95–110)
CO2 SERPL-SCNC: 14 MMOL/L (ref 23–29)
CO2 SERPL-SCNC: 22 MMOL/L (ref 23–29)
CO2 SERPL-SCNC: 28 MMOL/L (ref 23–29)
CO2 SERPL-SCNC: 6 MMOL/L (ref 23–29)
CREAT SERPL-MCNC: 1.6 MG/DL (ref 0.5–1.4)
CREAT SERPL-MCNC: 1.7 MG/DL (ref 0.5–1.4)
CREAT SERPL-MCNC: 1.7 MG/DL (ref 0.5–1.4)
CREAT SERPL-MCNC: 2.1 MG/DL (ref 0.5–1.4)
CREAT SERPL-MCNC: 2.3 MG/DL (ref 0.5–1.4)
DIFFERENTIAL METHOD: ABNORMAL
EOSINOPHIL # BLD AUTO: 0 K/UL (ref 0–0.5)
EOSINOPHIL NFR BLD: 0.1 % (ref 0–8)
ERYTHROCYTE [DISTWIDTH] IN BLOOD BY AUTOMATED COUNT: 14.7 % (ref 11.5–14.5)
EST. GFR  (NO RACE VARIABLE): 28.2 ML/MIN/1.73 M^2
EST. GFR  (NO RACE VARIABLE): 31.4 ML/MIN/1.73 M^2
EST. GFR  (NO RACE VARIABLE): 40.5 ML/MIN/1.73 M^2
EST. GFR  (NO RACE VARIABLE): 40.5 ML/MIN/1.73 M^2
GLUCOSE SERPL-MCNC: 150 MG/DL (ref 70–110)
GLUCOSE SERPL-MCNC: 185 MG/DL (ref 70–110)
GLUCOSE SERPL-MCNC: 490 MG/DL (ref 70–110)
GLUCOSE SERPL-MCNC: 580 MG/DL (ref 70–110)
GLUCOSE SERPL-MCNC: 795 MG/DL (ref 70–110)
HCO3 UR-SCNC: 8.1 MMOL/L (ref 24–28)
HCT VFR BLD AUTO: 36.6 % (ref 40–54)
HCT VFR BLD CALC: 34 %PCV (ref 36–54)
HGB BLD-MCNC: 11.1 G/DL (ref 14–18)
IMM GRANULOCYTES # BLD AUTO: 0.04 K/UL (ref 0–0.04)
IMM GRANULOCYTES NFR BLD AUTO: 0.5 % (ref 0–0.5)
INR PPP: 1 (ref 0.8–1.2)
LACTATE SERPL-SCNC: 6.2 MMOL/L (ref 0.5–2.2)
LIPASE SERPL-CCNC: 6 U/L (ref 4–60)
LYMPHOCYTES # BLD AUTO: 0.6 K/UL (ref 1–4.8)
LYMPHOCYTES NFR BLD: 6.9 % (ref 18–48)
MAGNESIUM SERPL-MCNC: 2.1 MG/DL (ref 1.6–2.6)
MCH RBC QN AUTO: 28.9 PG (ref 27–31)
MCHC RBC AUTO-ENTMCNC: 30.3 G/DL (ref 32–36)
MCV RBC AUTO: 95 FL (ref 82–98)
MONOCYTES # BLD AUTO: 0.4 K/UL (ref 0.3–1)
MONOCYTES NFR BLD: 4.5 % (ref 4–15)
NEUTROPHILS # BLD AUTO: 7.6 K/UL (ref 1.8–7.7)
NEUTROPHILS NFR BLD: 87.7 % (ref 38–73)
NRBC BLD-RTO: 0 /100 WBC
PCO2 BLDA: 26.5 MMHG (ref 35–45)
PH SMN: 7.09 [PH] (ref 7.35–7.45)
PHOSPHATE SERPL-MCNC: 1.8 MG/DL (ref 2.7–4.5)
PHOSPHATE SERPL-MCNC: 2.5 MG/DL (ref 2.7–4.5)
PHOSPHATE SERPL-MCNC: 7.4 MG/DL (ref 2.7–4.5)
PLATELET # BLD AUTO: 269 K/UL (ref 150–450)
PMV BLD AUTO: 9.8 FL (ref 9.2–12.9)
PO2 BLDA: 45 MMHG (ref 40–60)
POC BE: -22 MMOL/L
POC IONIZED CALCIUM: 1.25 MMOL/L (ref 1.06–1.42)
POC SATURATED O2: 65 % (ref 95–100)
POC TCO2 (MEASURED): 14 MMOL/L (ref 23–29)
POC TCO2: 9 MMOL/L (ref 24–29)
POCT GLUCOSE: 123 MG/DL (ref 70–110)
POCT GLUCOSE: 131 MG/DL (ref 70–110)
POCT GLUCOSE: 137 MG/DL (ref 70–110)
POCT GLUCOSE: 223 MG/DL (ref 70–110)
POCT GLUCOSE: 250 MG/DL (ref 70–110)
POCT GLUCOSE: 348 MG/DL (ref 70–110)
POCT GLUCOSE: 407 MG/DL (ref 70–110)
POCT GLUCOSE: 486 MG/DL (ref 70–110)
POCT GLUCOSE: >500 MG/DL (ref 70–110)
POTASSIUM BLD-SCNC: 3.6 MMOL/L (ref 3.5–5.1)
POTASSIUM SERPL-SCNC: 3.4 MMOL/L (ref 3.5–5.1)
POTASSIUM SERPL-SCNC: 3.5 MMOL/L (ref 3.5–5.1)
POTASSIUM SERPL-SCNC: 4.6 MMOL/L (ref 3.5–5.1)
POTASSIUM SERPL-SCNC: 5.2 MMOL/L (ref 3.5–5.1)
PROT SERPL-MCNC: 6.9 G/DL (ref 6–8.4)
PROTHROMBIN TIME: 10.6 SEC (ref 9–12.5)
RBC # BLD AUTO: 3.84 M/UL (ref 4.6–6.2)
SALICYLATES SERPL-MCNC: <5 MG/DL (ref 15–30)
SAMPLE: ABNORMAL
SAMPLE: ABNORMAL
SARS-COV-2 RDRP RESP QL NAA+PROBE: NEGATIVE
SITE: ABNORMAL
SODIUM BLD-SCNC: 141 MMOL/L (ref 136–145)
SODIUM SERPL-SCNC: 140 MMOL/L (ref 136–145)
SODIUM SERPL-SCNC: 142 MMOL/L (ref 136–145)
SODIUM SERPL-SCNC: 145 MMOL/L (ref 136–145)
SODIUM SERPL-SCNC: 146 MMOL/L (ref 136–145)
TROPONIN I SERPL DL<=0.01 NG/ML-MCNC: 0.02 NG/ML (ref 0–0.03)
WBC # BLD AUTO: 8.64 K/UL (ref 3.9–12.7)

## 2023-03-23 PROCEDURE — 82803 BLOOD GASES ANY COMBINATION: CPT

## 2023-03-23 PROCEDURE — 20000000 HC ICU ROOM

## 2023-03-23 PROCEDURE — 93005 ELECTROCARDIOGRAM TRACING: CPT

## 2023-03-23 PROCEDURE — 51798 US URINE CAPACITY MEASURE: CPT

## 2023-03-23 PROCEDURE — 63600175 PHARM REV CODE 636 W HCPCS

## 2023-03-23 PROCEDURE — 80048 BASIC METABOLIC PNL TOTAL CA: CPT | Mod: 91,XB

## 2023-03-23 PROCEDURE — 83605 ASSAY OF LACTIC ACID: CPT | Performed by: STUDENT IN AN ORGANIZED HEALTH CARE EDUCATION/TRAINING PROGRAM

## 2023-03-23 PROCEDURE — 99291 CRITICAL CARE FIRST HOUR: CPT | Mod: GC,,, | Performed by: INTERNAL MEDICINE

## 2023-03-23 PROCEDURE — 25000003 PHARM REV CODE 250

## 2023-03-23 PROCEDURE — 96365 THER/PROPH/DIAG IV INF INIT: CPT

## 2023-03-23 PROCEDURE — 83690 ASSAY OF LIPASE: CPT

## 2023-03-23 PROCEDURE — 87040 BLOOD CULTURE FOR BACTERIA: CPT

## 2023-03-23 PROCEDURE — 85610 PROTHROMBIN TIME: CPT | Performed by: STUDENT IN AN ORGANIZED HEALTH CARE EDUCATION/TRAINING PROGRAM

## 2023-03-23 PROCEDURE — 80048 BASIC METABOLIC PNL TOTAL CA: CPT | Mod: XB

## 2023-03-23 PROCEDURE — 84484 ASSAY OF TROPONIN QUANT: CPT | Performed by: STUDENT IN AN ORGANIZED HEALTH CARE EDUCATION/TRAINING PROGRAM

## 2023-03-23 PROCEDURE — 93010 EKG 12-LEAD: ICD-10-PCS | Mod: ,,, | Performed by: INTERNAL MEDICINE

## 2023-03-23 PROCEDURE — 96367 TX/PROPH/DG ADDL SEQ IV INF: CPT

## 2023-03-23 PROCEDURE — 93010 ELECTROCARDIOGRAM REPORT: CPT | Mod: ,,, | Performed by: INTERNAL MEDICINE

## 2023-03-23 PROCEDURE — 83735 ASSAY OF MAGNESIUM: CPT | Performed by: STUDENT IN AN ORGANIZED HEALTH CARE EDUCATION/TRAINING PROGRAM

## 2023-03-23 PROCEDURE — 80053 COMPREHEN METABOLIC PANEL: CPT

## 2023-03-23 PROCEDURE — 82010 KETONE BODYS QUAN: CPT

## 2023-03-23 PROCEDURE — 99900035 HC TECH TIME PER 15 MIN (STAT)

## 2023-03-23 PROCEDURE — 80179 DRUG ASSAY SALICYLATE: CPT | Performed by: STUDENT IN AN ORGANIZED HEALTH CARE EDUCATION/TRAINING PROGRAM

## 2023-03-23 PROCEDURE — 99285 EMERGENCY DEPT VISIT HI MDM: CPT | Mod: 25

## 2023-03-23 PROCEDURE — 84100 ASSAY OF PHOSPHORUS: CPT | Mod: 91

## 2023-03-23 PROCEDURE — 99291 PR CRITICAL CARE, E/M 30-74 MINUTES: ICD-10-PCS | Mod: CS,,, | Performed by: STUDENT IN AN ORGANIZED HEALTH CARE EDUCATION/TRAINING PROGRAM

## 2023-03-23 PROCEDURE — 25000003 PHARM REV CODE 250: Performed by: STUDENT IN AN ORGANIZED HEALTH CARE EDUCATION/TRAINING PROGRAM

## 2023-03-23 PROCEDURE — 85025 COMPLETE CBC W/AUTO DIFF WBC: CPT

## 2023-03-23 PROCEDURE — S5010 5% DEXTROSE AND 0.45% SALINE: HCPCS

## 2023-03-23 PROCEDURE — 94761 N-INVAS EAR/PLS OXIMETRY MLT: CPT

## 2023-03-23 PROCEDURE — 99291 CRITICAL CARE FIRST HOUR: CPT | Mod: CS,,, | Performed by: STUDENT IN AN ORGANIZED HEALTH CARE EDUCATION/TRAINING PROGRAM

## 2023-03-23 PROCEDURE — 99291 PR CRITICAL CARE, E/M 30-74 MINUTES: ICD-10-PCS | Mod: GC,,, | Performed by: INTERNAL MEDICINE

## 2023-03-23 PROCEDURE — U0002 COVID-19 LAB TEST NON-CDC: HCPCS | Performed by: STUDENT IN AN ORGANIZED HEALTH CARE EDUCATION/TRAINING PROGRAM

## 2023-03-23 RX ORDER — SODIUM CHLORIDE 9 MG/ML
125 INJECTION, SOLUTION INTRAVENOUS CONTINUOUS
Status: DISCONTINUED | OUTPATIENT
Start: 2023-03-23 | End: 2023-03-23

## 2023-03-23 RX ORDER — SODIUM CHLORIDE AND POTASSIUM CHLORIDE 150; 900 MG/100ML; MG/100ML
INJECTION, SOLUTION INTRAVENOUS CONTINUOUS
Status: DISCONTINUED | OUTPATIENT
Start: 2023-03-23 | End: 2023-03-23

## 2023-03-23 RX ORDER — DEXTROSE MONOHYDRATE AND SODIUM CHLORIDE 5; .45 G/100ML; G/100ML
125 INJECTION, SOLUTION INTRAVENOUS CONTINUOUS PRN
Status: DISCONTINUED | OUTPATIENT
Start: 2023-03-23 | End: 2023-03-24

## 2023-03-23 RX ORDER — POLYETHYLENE GLYCOL 3350 17 G/17G
17 POWDER, FOR SOLUTION ORAL DAILY
Status: DISCONTINUED | OUTPATIENT
Start: 2023-03-24 | End: 2023-03-23

## 2023-03-23 RX ORDER — HEPARIN SODIUM 5000 [USP'U]/ML
5000 INJECTION, SOLUTION INTRAVENOUS; SUBCUTANEOUS
Status: DISCONTINUED | OUTPATIENT
Start: 2023-03-23 | End: 2023-03-24

## 2023-03-23 RX ORDER — SODIUM CHLORIDE 0.9 % (FLUSH) 0.9 %
10 SYRINGE (ML) INJECTION
Status: DISCONTINUED | OUTPATIENT
Start: 2023-03-23 | End: 2023-03-28 | Stop reason: HOSPADM

## 2023-03-23 RX ORDER — ACETAMINOPHEN 325 MG/1
650 TABLET ORAL EVERY 4 HOURS PRN
Status: DISCONTINUED | OUTPATIENT
Start: 2023-03-23 | End: 2023-03-28 | Stop reason: HOSPADM

## 2023-03-23 RX ORDER — ONDANSETRON 2 MG/ML
4 INJECTION INTRAMUSCULAR; INTRAVENOUS EVERY 6 HOURS PRN
Status: DISCONTINUED | OUTPATIENT
Start: 2023-03-23 | End: 2023-03-28 | Stop reason: HOSPADM

## 2023-03-23 RX ORDER — INSULIN ASPART 100 [IU]/ML
5 INJECTION, SOLUTION INTRAVENOUS; SUBCUTANEOUS
Status: DISCONTINUED | OUTPATIENT
Start: 2023-03-23 | End: 2023-03-28 | Stop reason: HOSPADM

## 2023-03-23 RX ADMIN — SODIUM CHLORIDE 1000 ML: 9 INJECTION, SOLUTION INTRAVENOUS at 07:03

## 2023-03-23 RX ADMIN — PIPERACILLIN AND TAZOBACTAM 4.5 G: 4; .5 INJECTION, POWDER, LYOPHILIZED, FOR SOLUTION INTRAVENOUS; PARENTERAL at 07:03

## 2023-03-23 RX ADMIN — PIPERACILLIN AND TAZOBACTAM 4.5 G: 4; .5 INJECTION, POWDER, LYOPHILIZED, FOR SOLUTION INTRAVENOUS; PARENTERAL at 03:03

## 2023-03-23 RX ADMIN — VANCOMYCIN HYDROCHLORIDE 1500 MG: 1.5 INJECTION, POWDER, LYOPHILIZED, FOR SOLUTION INTRAVENOUS at 08:03

## 2023-03-23 RX ADMIN — INSULIN DETEMIR 6 UNITS: 100 INJECTION, SOLUTION SUBCUTANEOUS at 09:03

## 2023-03-23 RX ADMIN — INSULIN DETEMIR 20 UNITS: 100 INJECTION, SOLUTION SUBCUTANEOUS at 09:03

## 2023-03-23 RX ADMIN — HEPARIN SODIUM 5000 UNITS: 5000 INJECTION INTRAVENOUS; SUBCUTANEOUS at 10:03

## 2023-03-23 RX ADMIN — HEPARIN SODIUM 5000 UNITS: 5000 INJECTION INTRAVENOUS; SUBCUTANEOUS at 06:03

## 2023-03-23 RX ADMIN — DEXTROSE MONOHYDRATE AND SODIUM CHLORIDE 125 ML/HR: 5; .45 INJECTION, SOLUTION INTRAVENOUS at 05:03

## 2023-03-23 RX ADMIN — SODIUM CHLORIDE AND POTASSIUM CHLORIDE: .9; .15 SOLUTION INTRAVENOUS at 02:03

## 2023-03-23 RX ADMIN — SODIUM CHLORIDE 125 ML/HR: 9 INJECTION, SOLUTION INTRAVENOUS at 10:03

## 2023-03-23 RX ADMIN — INSULIN HUMAN 0.1 UNITS/KG/HR: 1 INJECTION, SOLUTION INTRAVENOUS at 09:03

## 2023-03-23 RX ADMIN — PIPERACILLIN AND TAZOBACTAM 4.5 G: 4; .5 INJECTION, POWDER, LYOPHILIZED, FOR SOLUTION INTRAVENOUS; PARENTERAL at 11:03

## 2023-03-23 NOTE — SUBJECTIVE & OBJECTIVE
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    Hyperlipidemia     Hypertension     Kidney stone     Migraine headache     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 08/02/2012    Paroxysmal atrial fibrillation     Pressure sore     STEMI involving right coronary artery 01/09/2022    Type II or unspecified type diabetes mellitus with neurological manifestations, uncontrolled(250.62) 03/08/2013    Urinary tract infection        Past Surgical History:   Procedure Laterality Date    BACK SURGERY      CATARACT EXTRACTION W/  INTRAOCULAR LENS IMPLANT Right     Per Dr Romero note 11/2018    COLONOSCOPY N/A 1/28/2019    Procedure: COLONOSCOPY Suprep;  Surgeon: Anh Johnson MD;  Location: [facility];  Service: Endoscopy;  Laterality: N/A;    ESOPHAGOGASTRODUODENOSCOPY N/A 9/15/2022    Procedure: EGD (ESOPHAGOGASTRODUODENOSCOPY);  Surgeon: Dashawn Evans MD;  Location: [facility];  Service: Endoscopy;  Laterality: N/A;    EYE SURGERY      HERNIA REPAIR      KYPHOPLASTY, SPINE, LUMBAR N/A 2/1/2023    Procedure: KYPHOPLASTY, SPINE, LUMBAR;  Surgeon: Kimberly Spicer MD;  Location: [facility] [place] OR;  Service: Neurosurgery;  Laterality: N/A;  Ane: Gen  Blood: Type & Hold  Pos: Prone  Rad: C-arm x 2  Spec Equip: Depuy Synthes - Tray Cortez    LEFT HEART CATHETERIZATION Left 1/9/2022    Procedure: CATHETERIZATION, HEART, LEFT;  Surgeon: Will Hurst III, MD;  Location: [facility] CATH LAB/EP;  Service: Cardiology;  Laterality: Left;    renal stones      SHOULDER OPEN ROTATOR CUFF REPAIR         Review of patient's allergies indicates:   Allergen Reactions    Iodine      Other reaction(s): swelling  Other reaction(s): Itching  Other reaction(s):  Rash / IVP       Family History       Problem Relation (Age of Onset)    Diabetes Father          Tobacco Use    Smoking status: Former     Packs/day: 1.50     Years: 25.00     Pack years: 37.50     Types: Cigarettes     Quit date: 1983     Years since quittin.2    Smokeless tobacco: Never   Substance and Sexual Activity    Alcohol use: No    Drug use: No    Sexual activity: Yes     Partners: Female      Review of Systems   Constitutional:  Positive for fatigue. Negative for activity change, appetite change and fever.   Respiratory:  Negative for cough, shortness of breath and wheezing.    Cardiovascular:  Negative for chest pain and leg swelling.   Gastrointestinal:  Positive for diarrhea, nausea and vomiting. Negative for abdominal pain and constipation.   Musculoskeletal:  Positive for myalgias.   Skin:  Negative for rash.   Neurological:  Negative for headaches.   Objective:     Vital Signs (Most Recent):  Temp: 97.1 °F (36.2 °C) (23 0733)  Pulse: 80 (23 1300)  Resp: 19 (23 1300)  BP: (!) 102/51 (23 1300)  SpO2: 99 % (23 1300)   Vital Signs (24h Range):  Temp:  [97.1 °F (36.2 °C)-100 °F (37.8 °C)] 97.1 °F (36.2 °C)  Pulse:  [] 80  Resp:  [18-22] 19  SpO2:  [96 %-100 %] 99 %  BP: ()/(51-72) 102/51   Weight: 74.8 kg (165 lb)  Body mass index is 21.18 kg/m².      Intake/Output Summary (Last 24 hours) at 3/23/2023 1347  Last data filed at 3/23/2023 1018  Gross per 24 hour   Intake 250 ml   Output --   Net 250 ml       Physical Exam  Vitals and nursing note reviewed.   HENT:      Head: Normocephalic and atraumatic.   Eyes:      Extraocular Movements: Extraocular movements intact.      Conjunctiva/sclera: Conjunctivae normal.   Cardiovascular:      Rate and Rhythm: Normal rate and regular rhythm.      Pulses: Normal pulses.   Pulmonary:      Effort: Pulmonary effort is normal. No respiratory distress.      Breath sounds: No wheezing or rales.   Abdominal:       Palpations: Abdomen is soft.      Tenderness: There is no abdominal tenderness. There is no guarding.   Musculoskeletal:         General: Signs of injury (Clavicle fracture) present.      Cervical back: Tenderness (R clavicular tenderness to palpation) present.      Right lower leg: No edema.      Left lower leg: No edema.   Skin:     General: Skin is warm and dry.      Findings: Lesion (LUE bandaged from skin graft, Scalp bandage from excision with skin graft) present.   Neurological:      General: No focal deficit present.      Mental Status: He is alert. He is disoriented.   Psychiatric:         Mood and Affect: Mood normal.       Vents:     Lines/Drains/Airways       Drain  Duration             Male External Urinary Catheter 03/23/23 0758 Medium <1 day              Peripheral Intravenous Line  Duration                  Peripheral IV - Single Lumen 03/23/23 0804 20 G Right Antecubital <1 day         Peripheral IV - Single Lumen 03/23/23 0805 20 G Right Forearm <1 day                  Significant Labs:    CBC/Anemia Profile:  Recent Labs   Lab 03/23/23  0716 03/23/23  1107   WBC 8.64  --    HGB 11.1*  --    HCT 36.6* 34*     --    MCV 95  --    RDW 14.7*  --         Chemistries:  Recent Labs   Lab 03/23/23  0716 03/23/23  1218    142   K 5.2* 3.5   CL 99 107   CO2 6* 14*   BUN 28* 26*   CREATININE 2.3* 2.1*   CALCIUM 8.8 8.8   ALBUMIN 2.9*  --    PROT 6.9  --    BILITOT 0.3  --    ALKPHOS 177*  --    ALT 28  --    AST 28  --    MG 2.1  --    PHOS 7.4* 2.5*       All pertinent labs within the past 24 hours have been reviewed.    Significant Imaging: I have reviewed all pertinent imaging results/findings within the past 24 hours.

## 2023-03-23 NOTE — ASSESSMENT & PLAN NOTE
Cr 2.3 (baseline 1.1).  Patient dry on physical exam.  Poor oral intake, most likely prerenal in origin.    Recent Labs   Lab 03/23/23  0716 03/23/23  1218    142   K 5.2* 3.5   CL 99 107   CO2 6* 14*   BUN 28* 26*   CREATININE 2.3* 2.1*       Plan:  - strict I/O  - daily weights  - encourage PO intake  - CMP qd, trend Cr  - renally dose all medications  - avoid nephrotoxic agents, NSAIDs, IV contrast, ACE/ARB

## 2023-03-23 NOTE — PROGRESS NOTES
Pharmacokinetic Initial Assessment: IV Vancomycin    Assessment/Plan:    Vancomycin 1500 mg given this AM at 0821. Will give subsequent doses when random concentrations are less than 20 mcg/mL  Patient is in BRENDAN, will pulse dose vancomycin  Desired empiric serum trough concentration is 10 to 20 mcg/mL  Draw vancomycin random level on 3/24 at 0300 with AM labs.  Pharmacy will continue to follow and monitor vancomycin.      Please contact pharmacy at extension [phone] with any questions regarding this assessment.     Thank you for the consult,   Mayi Webb       Patient brief summary:  Kamar Muñoz is a 77 y.o. male initiated on antimicrobial therapy with IV Vancomycin for treatment of suspected bacteremia    Drug Allergies:   Review of patient's allergies indicates:   Allergen Reactions    Iodine      Other reaction(s): swelling  Other reaction(s): Itching  Other reaction(s): Rash / IVP       Actual Body Weight:   74.8 kg    Renal Function:   Estimated Creatinine Clearance: 30.2 mL/min (A) (based on SCr of 2.1 mg/dL (H)).,     Dialysis Method (if applicable):  N/A    CBC (last 72 hours):  Recent Labs   Lab Result Units 03/23/23  0716   WBC K/uL 8.64   Hemoglobin g/dL 11.1*   Hematocrit % 36.6*   Platelets K/uL 269   Gran % % 87.7*   Lymph % % 6.9*   Mono % % 4.5   Eosinophil % % 0.1   Basophil % % 0.3   Differential Method  Automated       Metabolic Panel (last 72 hours):  Recent Labs   Lab Result Units 03/23/23  0716 03/23/23  1218   Sodium mmol/L 140 142   Potassium mmol/L 5.2* 3.5   Chloride mmol/L 99 107   CO2 mmol/L 6* 14*   Glucose mg/dL 795* 490*   BUN mg/dL 28* 26*   Creatinine mg/dL 2.3* 2.1*   Albumin g/dL 2.9*  --    Total Bilirubin mg/dL 0.3  --    Alkaline Phosphatase U/L 177*  --    AST U/L 28  --    ALT U/L 28  --    Magnesium mg/dL 2.1  --    Phosphorus mg/dL 7.4* 2.5*       Drug levels (last 3 results):  No results for input(s): VANCOMYCINRA, VANCORANDOM, VANCOMYCINPE, VANCOPEAK, VANCOMYCINTR,  COLT in the last 72 hours.    Microbiologic Results:  Microbiology Results (last 7 days)       Procedure Component Value Units Date/Time    Blood culture x two cultures. Draw prior to antibiotics. [[id number]] Collected: 03/23/23 0716    Order Status: Sent Specimen: Blood from Peripheral, Antecubital, Right Updated: 03/23/23 0727    Blood culture x two cultures. Draw prior to antibiotics. [[id number]] Collected: 03/23/23 0716    Order Status: Sent Specimen: Blood from Peripheral, Antecubital, Right Updated: 03/23/23 0727

## 2023-03-23 NOTE — ASSESSMENT & PLAN NOTE
Decubitus sacral ulcer 3 mo per daughter from being bed-bound.    - Wound care consulted with appreciated recs for care

## 2023-03-23 NOTE — HPI
"76 y/o M with T2DM (last A1C 8.9) complicated by peripheral neuropathy, pAF (on apixaban), seizure disorder, HLD, previous CVA, and CAD s/p PCI to RCA (on clopidogrel) who [facility] with waxing and waning mentation, weakness, nausea and vomiting, increased urinary frequency that started 4-5 days ago. He also has shortness of breath associated with shoulder pain from a recent clavicle fracture and a history of diarrhea (3 mo) that started after he started chronic antibiotic use for a sacral decubitus ulcer he developed from being bed-bound after debility from his MI and subsequent CVA last year. He recently fell out of his bed at the NH and sustained a clavicle fracture for which he doesn't have an adequate sling. Patient only able to answer yes/no questions, so collateral provided by his daughter, Basia, at the bedside. Basia says she's unsure if patient gets his insulin regimen at the NH. A nurse at the facility recently said his BG was "all over the place". A1C has improved from 10 to 8 in recent months. His LUE is bandaged from a skin graft site used to cover an excised skin cancer that was removed on Monday.     In the ED patient started on insulin ggt for , K 5.2, AG 35, BHB 5.3. CXR with concern for LLL PNA, UA not drawn due to anuria in setting of poor po intake, N/V, CT head non-contributory. Blood cultures drawn. Started on broad spectrum antibiotics. Admitted to the MICU for DKA management.   "

## 2023-03-23 NOTE — ED PROVIDER NOTES
Encounter Date: 3/23/2023       History     Chief Complaint   Patient presents with    Hyperglycemia     [facility] resident that has CBG >600, pt feels hot and hypotensive on EMS arrival     Kamar Muñoz is a 77 y.o. male with a PMH of CVA, T2DM, STEMI, paroxysmal a fib, HLD, HTN, and UTIs [place] [facility] [place] evaluation of uncontrolled hyperglycemia over the past week. Per EMS, the nursing facility he lives in stated his sugars have been difficult to control this week ranging in the 600s today and he has become progressively more confused. EMS noted the patient was diaphoretic, hot to touch, and hypotensive on their arrival. He has a bandage on his head reportedly from recent skin lesion removal. Per patient's daughter at bedside (Basia) and other daughter by phone (Marisa) he has been more confused over the last week. Patient is awake and alert but does not respond to questions.     The history is provided by the patient, medical records, the EMS personnel and the nursing home. The history is limited by the condition of the patient. No  was used.   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    Hyperlipidemia     Hypertension     Kidney stone     Migraine headache     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 08/02/2012    Paroxysmal atrial fibrillation     Pressure sore     STEMI involving right coronary artery 01/09/2022    Type II or unspecified type diabetes mellitus with neurological manifestations,  uncontrolled(250.62) 2013    Urinary tract infection        Family History   Problem Relation Age of Onset    Diabetes Father     Prostate cancer Neg Hx     Kidney disease Neg Hx      Social History     Tobacco Use    Smoking status: Former     Packs/day: 1.50     Years: 25.00     Pack years: 37.50     Types: Cigarettes     Quit date: 1983     Years since quittin.2    Smokeless tobacco: Never   Substance Use Topics    Alcohol use: No    Drug use: No     Review of Systems    Physical Exam     Initial Vitals [23 0633]   BP Pulse Resp Temp SpO2   106/72 100 (!) 22 100 °F (37.8 °C) 100 %      MAP       --         Physical Exam    Nursing note and vitals reviewed.  Constitutional: He is diaphoretic. No distress.   Appears uncomfortable    HENT:   Right Ear: External ear normal.   Left Ear: External ear normal.   Mouth/Throat: Oropharynx is clear and moist. No oropharyngeal exudate.   Eyes: Conjunctivae and EOM are normal. No scleral icterus.   Neck: Neck supple.   Cardiovascular:  Regular rhythm.   Tachycardia present.         No murmur heard.  Pulmonary/Chest: Breath sounds normal. No stridor. No respiratory distress. He has no wheezes. He has no rhonchi. He has no rales.   Abdominal: Abdomen is soft. He exhibits no distension. There is no abdominal tenderness. There is no rebound and no guarding.   Musculoskeletal:         General: No edema.      Cervical back: Neck supple.     Neurological: He is alert.   Looking around and moving all extremities spontaneously, does not follow commands.  No noted facial droop or slurred speech    Skin: Capillary refill takes 2 to 3 seconds. No rash noted.   Hot to touch, diaphoretic        ED Course   Procedures  Labs Reviewed   CBC W/ AUTO DIFFERENTIAL - Abnormal; Notable for the following components:       Result Value    RBC 3.84 (*)     Hemoglobin 11.1 (*)     Hematocrit 36.6 (*)     MCHC 30.3 (*)     RDW 14.7 (*)     Lymph # 0.6 (*)     Gran % 87.7 (*)      Lymph % 6.9 (*)     All other components within normal limits   COMPREHENSIVE METABOLIC PANEL - Abnormal; Notable for the following components:    Potassium 5.2 (*)     CO2 6 (*)     Glucose 795 (*)     BUN 28 (*)     Creatinine 2.3 (*)     Albumin 2.9 (*)     Alkaline Phosphatase 177 (*)     Anion Gap 35 (*)     eGFR 28.2 (*)     All other components within normal limits   BETA - HYDROXYBUTYRATE, SERUM - Abnormal; Notable for the following components:    Beta-Hydroxybutyrate 5.3 (*)     All other components within normal limits   LACTIC ACID, PLASMA - Abnormal; Notable for the following components:    Lactate (Lactic Acid) 6.2 (*)     All other components within normal limits   SALICYLATE LEVEL - Abnormal; Notable for the following components:    Salicylate Lvl <5.0 (*)     All other components within normal limits   PHOSPHORUS - Abnormal; Notable for the following components:    Phosphorus 7.4 (*)     All other components within normal limits    Narrative:     add on lipase, phos & mg per Shraddha Cooper MD order# [id number]   [id number] 03/23/2023 @ 08:53    ISTAT PROCEDURE - Abnormal; Notable for the following components:    POC PH 7.093 (*)     POC PCO2 26.5 (*)     POC HCO3 8.1 (*)     POC SATURATED O2 65 (*)     POC TCO2 9 (*)     All other components within normal limits   POCT GLUCOSE - Abnormal; Notable for the following components:    POCT Glucose >500 (*)     All other components within normal limits   ISTAT PROCEDURE - Abnormal; Notable for the following components:    POC Glucose 580 (*)     POC Creatinine 1.6 (*)     POC TCO2 (MEASURED) 14 (*)     POC Hematocrit 34 (*)     All other components within normal limits   POCT GLUCOSE - Abnormal; Notable for the following components:    POCT Glucose 486 (*)     All other components within normal limits   POCT GLUCOSE - Abnormal; Notable for the following components:    POCT Glucose 407 (*)     All other components within normal limits   TROPONIN I    MAGNESIUM   PROTIME-INR   SARS-COV-2 RNA AMPLIFICATION, QUAL    Narrative:     Is the patient symptomatic?->Yes  Is this needed for pre-procedure or pre-op testing?->No   LIPASE   MAGNESIUM   PHOSPHORUS   LIPASE    Narrative:     add on lipase, phos & mg per Shraddha Cooper MD order# [id number]   [id number] 03/23/2023 @ 08:53    BASIC METABOLIC PANEL   PHOSPHORUS   URINALYSIS, REFLEX TO URINE CULTURE   BASIC METABOLIC PANEL   PHOSPHORUS   POCT GLUCOSE MONITORING CONTINUOUS   POCT GLUCOSE MONITORING CONTINUOUS   POCT GLUCOSE MONITORING CONTINUOUS     EKG Readings: (Independently Interpreted)   Initial Reading: No STEMI. Previous EKG: Compared with most recent EKG Rhythm: Sinus Tachycardia. Heart Rate: 105. Conduction: 1st Degree AV Block.   ECG Results              EKG 12-lead (Final result)  Result time 03/23/23 08:24:28      Final result by Interface, Lab In [facility] (03/23/23 08:24:28)                   Narrative:    Test Reason : R73.9,    Vent. Rate : 105 BPM     Atrial Rate : 105 BPM     P-R Int : 234 ms          QRS Dur : 116 ms      QT Int : 308 ms       P-R-T Axes : 073 -06 258 degrees     QTc Int : 407 ms    Sinus tachycardia with 1st degree A-V block  ST and T wave abnormality, consider anterolateral ischemia  Abnormal ECG  When compared with ECG of 27-FEB-2023 17:25,  Significant changes have occurred  Confirmed by Lencho BARROS MD ([id number]) on 3/23/2023 8:24:16 AM    Referred By: [email or web address]   SELF           Confirmed By:Lencho BARROS MD                                  Imaging Results              CT Head Without Contrast (Final result)  Result time 03/23/23 08:19:24      Final result by Cresencio Hoang MD (03/23/23 08:19:24)                   Impression:      Extracranial soft tissue swelling/laceration without evidence of underlying fracture or acute intracranial hemorrhage.      Electronically signed by: Cresencio Hoang MD  Date:    03/23/2023  Time:    08:19               Narrative:     EXAMINATION:  CT HEAD WITHOUT CONTRAST    CLINICAL HISTORY:  Mental status change, unknown cause;    TECHNIQUE:  Low dose axial CT images obtained throughout the head without the use of intravenous contrast.  Axial, sagittal and coronal reconstructions were performed.    COMPARISON:  02/27/2023    FINDINGS:  Intracranial compartment:    Large remote right frontal infarct (MCA distribution) moderate-sized remote left cerebellar infarct.  Mild chronic small vessel ischemic changes.  No new territorial infarct.  No acute parenchymal hemorrhage no new intracranial mass effect or midline shift.    Stable pattern of cerebral volume loss with prominence of the ventricles and sulci.  No evidence of hydrocephalus.    No extra-axial blood or fluid collections.    Skull/extracranial contents (limited evaluation):    Right parietal extracranial soft tissue swelling with subcutaneous emphysema in keeping with the laceration.  Overlying bandage material in place.  No evidence of an associated calvarial fracture.    The mastoid air cells and visualized paranasal sinuses are essentially clear.                                       X-Ray Chest AP Portable (Final result)  Result time 03/23/23 08:24:24      Final result by Katt Rubi MD (03/23/23 08:24:24)                   Impression:      As above      Electronically signed by: Katt Rubi  Date:    03/23/2023  Time:    08:24               Narrative:    EXAMINATION:  XR CHEST AP PORTABLE    CLINICAL HISTORY:  hyperglycemia;    TECHNIQUE:  Single frontal view of the chest was performed.    COMPARISON:  02/27/2023    FINDINGS:  The lungs appear hyperinflated.  Pulmonary interstitial and vascular markings may be rendered increasing conspicuity by emphysema.  There is mild pulmonary venous congestion.  There is increased radiodensity of the left lower lung zone, concerning for a developing consolidation.    There is a vague 1.5 cm radiodensity over the left upper lung zone, which  on a CT 04/18/2022 appears to correlate to hypertrophic changes of the left costo manubrial junction.    The heart size appears normal.  There is mild calcification of the aortic knob consistent with atherosclerotic stigmata.    The bones appear unremarkable for the age of the patient.                                       Medications   insulin regular in 0.9 % NaCl 100 unit/100 mL (1 unit/mL) infusion (0.1 Units/kg/hr × 74.8 kg Intravenous No Dose/Rate Change 3/23/23 1423)   insulin detemir U-100 (Levemir) pen 20 Units (20 Units Subcutaneous Given 3/23/23 0921)   sodium chloride 0.9% flush 10 mL (has no administration in time range)   dextrose 5 % and 0.45 % NaCl infusion (has no administration in time range)   ondansetron injection 4 mg (has no administration in time range)   acetaminophen tablet 650 mg (has no administration in time range)   heparin (porcine) injection 5,000 Units (5,000 Units Subcutaneous Given 3/23/23 1044)   dextrose 10% bolus 125 mL 125 mL (has no administration in time range)   dextrose 10% bolus 250 mL 250 mL (has no administration in time range)   0.9 % NaCl with KCl 20 mEq infusion ( Intravenous New Bag 3/23/23 1418)   vancomycin - pharmacy to dose (has no administration in time range)   piperacillin-tazobactam (ZOSYN) 4.5 g in dextrose 5 % in water (D5W) 5 % 100 mL IVPB (MB+) (4.5 g Intravenous New Bag 3/23/23 1523)   sodium chloride 0.9% bolus 1,000 mL 1,000 mL (0 mLs Intravenous Stopped 3/23/23 0902)   piperacillin-tazobactam (ZOSYN) 4.5 g in dextrose 5 % in water (D5W) 5 % 100 mL IVPB (MB+) (0 g Intravenous Stopped 3/23/23 0820)   vancomycin 1,500 mg in dextrose 5 % (D5W) 250 mL IVPB (Vial-Mate) (0 mg Intravenous Stopped 3/23/23 1018)     Medical Decision Making:   History:   I obtained history from: EMS provider and someone other than patient.  Old Medical Records: I decided to obtain old medical records.  Old Records Summarized: records from clinic visits, records from previous  "admission(s), records from another hospital and other records.  Initial Assessment:   Patient arrives to the ED tachycardic, borderline febrile, diaphoretic, and tachypneic but is normotensive with normal O2 saturation.   He is unable to provide much history but family, nursing facility staff, and EMS providers help fill in the story.   Will evaluate for DKA/ HHS and potential causes.   Differential Diagnosis:   Differential diagnoses considered include, but not limited to:  DKA vs HHS  Sepsis  MI  Pneumonia  UTI   CVA  ICH    Independently Interpreted Test(s):   I have ordered and independently interpreted X-rays - see prior notes.  I have ordered and independently interpreted EKG Reading(s) - see prior notes  Clinical Tests:   Lab Tests: Ordered and Reviewed  Radiological Study: Ordered and Reviewed  Medical Tests: Ordered and Reviewed  Sepsis Perfusion Assessment: "I attest a sepsis perfusion exam was performed within 6 hours of sepsis, severe sepsis, or septic shock presentation, following fluid resuscitation."  ED Management:  Labs consistent with severe DKA. Bicarb and insulin drips started, fluids given.   Labs additionally concerning for sepsis - vanc and zosyn given. Cultures pending.     I discussed admission with critical care medicine who agreed to admit the patient for further evaluation and management.     Other:   I have discussed this case with another health care provider.           ED Course as of 03/23/23 1532   Thu Mar 23, 2023   0821 POC PH(!): 7.093 [AC]   0821 POC PCO2(!): 26.5 [AC]   0906 Troponin I: 0.022 [AC]   0906 Lactate, Avni(!!): 6.2 [AC]   0906 Potassium(!): 5.2 [AC]   0906 CO2(!!): 6 [AC]   0906 Glucose(!!): 795 [AC]   0906 BUN(!): 28 [AC]   0906 Creatinine(!): 2.3 [AC]   0907 Beta-Hydroxybutyrate(!): 5.3 [AC]      ED Course User Index  [AC] Sander Montesinos DO                   Clinical Impression:   Final diagnoses:  [R73.9] Hyperglycemia  [N17.9] BRENDAN (acute kidney injury) " (Primary)  [E13.10] Diabetic ketoacidosis without coma associated with other specified diabetes mellitus  [A41.9] Sepsis, due to unspecified organism, unspecified whether acute organ dysfunction present        ED Disposition Condition    Admit Stable                Myah Allen MD  Resident  03/23/23 0810

## 2023-03-23 NOTE — ED NOTES
Family at bedside at this time. Of note, per family, altered skin integrity on scalp is from recent skin cancer removal + graft placement. Altered skin integrity on left arm is from skin removal for graft for scalp. Family also stating patient had a recent fall (<2 weeks) and has x2 breaks near right clavicle/shoulder area.

## 2023-03-23 NOTE — H&P
"[facility] - Cardiac Medical ICU  Critical Care Medicine  History & Physical    Patient Name: Kamar Muñoz  MRN: [id number]  Admission Date: 3/23/2023  Hospital Length of Stay: 0 days  Code Status: DNR  Attending Physician: Giovanni Reinoso MD   Primary Care Provider: Basim Guerrero MD   Principal Problem: Diabetic ketoacidosis without coma associated with type 2 diabetes mellitus    Subjective:     HPI:  76 y/o M with T2DM (last A1C 8.9) complicated by peripheral neuropathy, pAF (on apixaban), seizure disorder, HLD, previous CVA, and CAD s/p PCI to RCA (on clopidogrel) who presented from [facility] with waxing and waning mentation, weakness, nausea and vomiting, increased urinary frequency that started 4-5 days ago. He also has shortness of breath associated with shoulder pain from a recent clavicle fracture and a history of diarrhea (3 mo) that started after he started chronic antibiotic use for a sacral decubitus ulcer he developed from being bed-bound after debility from his MI and subsequent CVA last year. He recently fell out of his bed at the NH and sustained a clavicle fracture for which he doesn't have an adequate sling. Patient only able to answer yes/no questions, so collateral provided by his daughter, Basia, at the bedside. Basia says she's unsure if patient gets his insulin regimen at the NH. A nurse at the facility recently said his BG was "all over the place". A1C has improved from 10 to 8 in recent months. His LUE is bandaged from a skin graft site used to cover an excised skin cancer that was removed on Monday.     In the ED patient started on insulin ggt for , K 5.2, AG 35, BHB 5.3. CXR with concern for LLL PNA, UA not drawn due to anuria in setting of poor po intake, N/V, CT head non-contributory. Blood cultures drawn. Started on broad spectrum antibiotics. Admitted to the MICU for DKA management.       Hospital/ICU Course:  No notes on file     Past Medical History: "   Diagnosis Date    Anticoagulant long-term use     Arthritis     At high risk for falls     hx stroke-lt sided weakness    Colon polyp     Coronary artery disease     COVID-19 virus infection 02/18/2022    Depression     Diabetes mellitus type II     Embolic stroke involving left cerebellar artery 01/13/2022    Hyperlipidemia     Hypertension     Kidney stone     Migraine headache     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 08/02/2012    Paroxysmal atrial fibrillation     Pressure sore     STEMI involving right coronary artery 01/09/2022    Type II or unspecified type diabetes mellitus with neurological manifestations, uncontrolled(250.62) 03/08/2013    Urinary tract infection        Past Surgical History:   Procedure Laterality Date    BACK SURGERY      CATARACT EXTRACTION W/  INTRAOCULAR LENS IMPLANT Right     Per Dr Romero note 11/2018    COLONOSCOPY N/A 1/28/2019    Procedure: COLONOSCOPY Suprep;  Surgeon: Anh Johnson MD;  Location: [facility];  Service: Endoscopy;  Laterality: N/A;    ESOPHAGOGASTRODUODENOSCOPY N/A 9/15/2022    Procedure: EGD (ESOPHAGOGASTRODUODENOSCOPY);  Surgeon: Dashawn Evans MD;  Location: [facility];  Service: Endoscopy;  Laterality: N/A;    EYE SURGERY      HERNIA REPAIR      KYPHOPLASTY, SPINE, LUMBAR N/A 2/1/2023    Procedure: KYPHOPLASTY, SPINE, LUMBAR;  Surgeon: Kimberly Spicer MD;  Location: [facility] [place] [facility];  Service: Neurosurgery;  Laterality: N/A;  Ane: Gen  Blood: Type & Hold  Pos: Prone  Rad: C-arm x 2  Spec Equip: Depuy Synthes - Tray Cortez    LEFT HEART CATHETERIZATION Left 1/9/2022    Procedure: CATHETERIZATION, HEART, LEFT;  Surgeon: Will Hurst III, MD;  Location: [facility] CATH LAB/EP;  Service: Cardiology;  Laterality: Left;    renal stones      SHOULDER OPEN ROTATOR CUFF REPAIR         Review of patient's allergies indicates:   Allergen Reactions    Iodine      Other reaction(s): swelling  Other reaction(s): Itching  Other reaction(s): Rash / IVP       Family  History       Problem Relation (Age of Onset)    Diabetes Father          Tobacco Use    Smoking status: Former     Packs/day: 1.50     Years: 25.00     Pack years: 37.50     Types: Cigarettes     Quit date: 1983     Years since quittin.2    Smokeless tobacco: Never   Substance and Sexual Activity    Alcohol use: No    Drug use: No    Sexual activity: Yes     Partners: Female      Review of Systems   Constitutional:  Positive for fatigue. Negative for activity change, appetite change and fever.   Respiratory:  Negative for cough, shortness of breath and wheezing.    Cardiovascular:  Negative for chest pain and leg swelling.   Gastrointestinal:  Positive for diarrhea, nausea and vomiting. Negative for abdominal pain and constipation.   Musculoskeletal:  Positive for myalgias.   Skin:  Negative for rash.   Neurological:  Negative for headaches.   Objective:     Vital Signs (Most Recent):  Temp: 97.1 °F (36.2 °C) (23 0733)  Pulse: 80 (23 1300)  Resp: 19 (23 1300)  BP: (!) 102/51 (23 1300)  SpO2: 99 % (23 1300)   Vital Signs (24h Range):  Temp:  [97.1 °F (36.2 °C)-100 °F (37.8 °C)] 97.1 °F (36.2 °C)  Pulse:  [] 80  Resp:  [18-22] 19  SpO2:  [96 %-100 %] 99 %  BP: ()/(51-72) 102/51   Weight: 74.8 kg (165 lb)  Body mass index is 21.18 kg/m².      Intake/Output Summary (Last 24 hours) at 3/23/2023 1347  Last data filed at 3/23/2023 1018  Gross per 24 hour   Intake 250 ml   Output --   Net 250 ml       Physical Exam  Vitals and nursing note reviewed.   HENT:      Head: Normocephalic and atraumatic.   Eyes:      Extraocular Movements: Extraocular movements intact.      Conjunctiva/sclera: Conjunctivae normal.   Cardiovascular:      Rate and Rhythm: Normal rate and regular rhythm.      Pulses: Normal pulses.   Pulmonary:      Effort: Pulmonary effort is normal. No respiratory distress.      Breath sounds: No wheezing or rales.   Abdominal:      Palpations: Abdomen is soft.       Tenderness: There is no abdominal tenderness. There is no guarding.   Musculoskeletal:         General: Signs of injury (Clavicle fracture) present.      Cervical back: Tenderness (R clavicular tenderness to palpation) present.      Right lower leg: No edema.      Left lower leg: No edema.   Skin:     General: Skin is warm and dry.      Findings: Lesion (LUE bandaged from skin graft, Scalp bandage from excision with skin graft) present.   Neurological:      General: No focal deficit present.      Mental Status: He is alert. He is disoriented.   Psychiatric:         Mood and Affect: Mood normal.       Vents:     Lines/Drains/Airways       Drain  Duration             Male External Urinary Catheter 03/23/23 0758 Medium <1 day              Peripheral Intravenous Line  Duration                  Peripheral IV - Single Lumen 03/23/23 0804 20 G Right Antecubital <1 day         Peripheral IV - Single Lumen 03/23/23 0805 20 G Right Forearm <1 day                  Significant Labs:    CBC/Anemia Profile:  Recent Labs   Lab 03/23/23  0716 03/23/23  1107   WBC 8.64  --    HGB 11.1*  --    HCT 36.6* 34*     --    MCV 95  --    RDW 14.7*  --         Chemistries:  Recent Labs   Lab 03/23/23  0716 03/23/23  1218    142   K 5.2* 3.5   CL 99 107   CO2 6* 14*   BUN 28* 26*   CREATININE 2.3* 2.1*   CALCIUM 8.8 8.8   ALBUMIN 2.9*  --    PROT 6.9  --    BILITOT 0.3  --    ALKPHOS 177*  --    ALT 28  --    AST 28  --    MG 2.1  --    PHOS 7.4* 2.5*       All pertinent labs within the past 24 hours have been reviewed.    Significant Imaging: I have reviewed all pertinent imaging results/findings within the past 24 hours.    Assessment/Plan:     Neuro  History of partial seizures  - resume home Vimpat BID    Encephalopathy, metabolic  In the setting of sepsis/DKA. Continue to monitor, replete electrolytes. Some concern for dementia, pseudodementia/depression 2/2 his wife of 60 years passing away last year, abruptly quitting  working after MI/CVA, and transition to nursing facility.    - Delirium precautions  - Fall precautions    Cardiac/Vascular  Paroxysmal atrial fibrillation   Hold home Toprol, Cardizem, and amiodarone given hypotension. Not on AC. [id number] Score: 4 HASBLED 4.    - Monitor on telemetry and re-start rate control if AFib recurs            Coronary artery disease involving native coronary artery of native heart with unstable angina pectoris  Resume home Lipitor when able to tolerate PO intake    Essential hypertension  Hold antihypertensives 2/2 sepsis    Renal/  BRENDAN (acute kidney injury)  Cr 2.3 (baseline 1.1).  Patient dry on physical exam.  Poor oral intake, most likely prerenal in origin.    Recent Labs   Lab 03/23/23  0716 03/23/23  1218    142   K 5.2* 3.5   CL 99 107   CO2 6* 14*   BUN 28* 26*   CREATININE 2.3* 2.1*       Plan:  - strict I/O  - daily weights  - IVF at 125 cc/hr  - CMP qd, trend Cr  - renally dose all medications  - avoid nephrotoxic agents, NSAIDs, IV contrast, ACE/ARB    Endocrine  * Diabetic ketoacidosis without coma associated with type 2 diabetes mellitus  - DKA/HHS Pathway initiated  - Blood sugar on arrival 795 with a bicarb 6, anion gap 35, BHB 5.3 and pH 7.093 on VBG  - HbA1c 8.9  - Likely induced by sepsis with suspected source UTI vs. PNA vs. Skin/soft tissue infection  - Home DM regimen: Aspart 5u TID, Detemir 6u qd  - Start insulin gtt per protocol with q1h accuchecks while on the drip.    - Once Bicarb >18, Anion gap <10, Glucose <200 on 2 readings and able to tolerate PO intake without nausea and vomiting, transition to subq insulin with a 1-2 h overlap with drip.   Long acting insulin dose:  Detemir (0.25mg/kg) daily or in 2 doses  Short acting insulin dose:  Aspart (0.08mg/kg) units per meal  - Start normal saline at 125cc/hr.  Once blood sugar is 200, change IVF to D5 1/2 NS at 50cc/h  - Monitor electrolytes with BMP q4h while on insulin gtt and place on tele  -  Bariatric clear liquid diet while on insulin gtt then change to Diabetic diet when initiating subq insulin with accuchecks 4x daily before meals and at bedtime (AC and HS)    Type 2 diabetes mellitus with hyperglycemia, with long-term current use of insulin  See DKA    Orthopedic  Pressure injury of buttock, unstageable  See altered skin integrity    Alteration in skin integrity  Decubitus sacral ulcer 3 mo per daughter from being bed-bound.    - Wound care consulted with appreciated recs for care        Critical Care Daily Checklist:    A: Awake: RASS Goal/Actual Goal:    Actual:     B: Spontaneous Breathing Trial Performed?     C: SAT & SBT Coordinated?  Not applicable                      D: Delirium: CAM-ICU Overall CAM-ICU: Positive   E: Early Mobility Performed? No   F: Feeding Goal:    Status:     Current Diet Order   Procedures    Diet NPO      AS: Analgesia/Sedation Tylenol prn   T: Thromboembolic Prophylaxis Heparin   H: HOB > 300 Yes   U: Stress Ulcer Prophylaxis (if needed) Not appliable   G: Glucose Control Insulin ggt   B: Bowel Function     I: Indwelling Catheter (Lines & Mccord) Necessity Miralax   D: De-escalation of Antimicrobials/Pharmacotherapies Not at this time    Plan for the day/ETD BG checks, close GAP, insulin ggt    Code Status:  Family/Goals of Care: DNR  Daughter updated       Critical secondary to Patient has an abrupt change in neurologic status: Weakness/DKA     Critical care was time spent personally by me on the following activities: development of treatment plan with patient or surrogate and bedside caregivers, discussions with consultants, evaluation of patient's response to treatment, examination of patient, ordering and performing treatments and interventions, ordering and review of laboratory studies, ordering and review of radiographic studies, pulse oximetry, re-evaluation of patient's condition. This critical care time did not overlap with that of any other provider or  involve time for any procedures.     Gatito Cesar MD  Critical Care Medicine  [facility] - Cardiac Medical ICU

## 2023-03-23 NOTE — ED NOTES
Pt cleaned of incontinence. Provided w/ new brief + Mepilex applied to sacrum + external urinary catheter placed. Pt repositioned in bed.

## 2023-03-23 NOTE — ASSESSMENT & PLAN NOTE
Hold home Toprol, Cardizem, and amiodarone given hypotension. Not on AC. [id number] Score: 4 HASBLED 4.    - Monitor on telemetry and re-start rate control if AFib recurs

## 2023-03-23 NOTE — ED NOTES
Assumed care of pt at this time. Pt lying supine in gown with BP cuff and pulse ox in place and connected to monitor. Respirations even and unlabored on room air. AAOx2 to self and time. Pt correctly states name and current year, unable to state current location. Follows commands.

## 2023-03-23 NOTE — ED NOTES
Kamar Muñoz, a 77 y.o. male presents to the ED via EMS from nursing home w/ complaint of hyperglycemia. EMS states BG >600. Pt arrives tachycardic and warm to touch. EMS reports pt was hypotensive around 80/40, 1L of NS infusing on arrival. BP now 106/72. Pt c/o R arm pain. Pt AAOx3, disoriented to time    Triage note:  Chief Complaint   Patient presents with    Hyperglycemia     [facility] resident that has CBG >600, pt feels hot and hypotensive on EMS arrival     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    Hyperlipidemia     Hypertension     Kidney stone     Migraine headache     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 08/02/2012    Paroxysmal atrial fibrillation     Pressure sore     STEMI involving right coronary artery 01/09/2022    Type II or unspecified type diabetes mellitus with neurological manifestations, uncontrolled(250.62) 03/08/2013    Urinary tract infection     Patient identifiers for Kamar Muñoz checked and correct.    LOC: The patient is awake, alert and aware of environment with an appropriate affect, the patient is oriented x3   APPEARANCE: Patient resting comfortably and in no acute distress, patient is clean and well groomed, patient's clothing is properly fastened.  SKIN: The skin is warm and dry, color consistent with ethnicity, patient has normal skin turgor and moist mucus membranes noted. Open wound to head  MUSCULOSKELETAL: Patient moving all extremities well, no obvious swelling or deformities noted. +generalized  weakness, R arm pain  RESPIRATORY: Airway is open and patent, respirations are spontaneous and even, patient has a normal effort and rate.  CARDIAC: Patient has a normal rate and rhythm, no periphreal edema noted, capillary refill < 3 seconds. Normal +2 pedal pulses present.  ABDOMEN: Soft and non tender to palpation, no distention noted. Patient denies any nausea, vomiting, diarrhea, or constipation.   NEUROLOGIC: Eyes open spontaneously, PERRL, behavior appropriate to situation, follows commands, facial expression symmetrical, bilateral hand grasp equal and even, purposeful motor response noted, normal sensation in all extremities.

## 2023-03-23 NOTE — ASSESSMENT & PLAN NOTE
In the setting of sepsis/DKA. Continue to monitor, replete electrolytes. Some concern for dementia, pseudodementia/depression 2/2 his wife of 60 years passing away last year, abruptly quitting working after MI/CVA, and transition to nursing facility.    - Delirium precautions  - Fall precautions

## 2023-03-23 NOTE — PLAN OF CARE
"[facility] - Cardiac Medical ICU  Initial Discharge Assessment       Primary Care Provider: Basim Guerrero MD    Admission Diagnosis: Hyperglycemia [R73.9]  BRENDAN (acute kidney injury) [N17.9]  Diabetic ketoacidosis without coma associated with other specified diabetes mellitus [E13.10]  Sepsis, due to unspecified organism, unspecified whether acute organ dysfunction present [A41.9]    Admission Date: 3/23/2023  Expected Discharge Date: 3/28/2023    Discharge Barriers Identified: None    Payor: MEDICARE / Plan: MEDICARE PART A & B / Product Type: Government /     Extended Emergency Contact Information  Primary Emergency Contact: Basia Herrera  Mobile Phone: [phone]  Relation: Daughter  Secondary Emergency Contact: Marisa Sampson  Mobile Phone: [phone]  Relation: Daughter  Preferred language: English   needed? No    Discharge Plan A: Return to nursing home  Discharge Plan B: Skilled Nursing Facility      [id number]  [place] - [address] AT [facility]  [address]  [place] [address]  Phone: [phone] Fax: [phone]    [facility]  [address] [facility] [address]  [facility] [address]  Phone: [phone] Fax: [phone]    [id number] [address] AT [address] [address]  Phone: [phone] Fax: [phone]      Initial Assessment (most recent)       Adult Discharge Assessment - 03/23/23 1558          Discharge Assessment    Assessment Type Discharge Planning Assessment     Confirmed/corrected address, phone number and insurance Yes     Confirmed Demographics Correct on Facesheet     Source of Information family     If unable to respond/provide information was family/caregiver contacted? Yes     Contact Name/Number jose Nava/[phone]     When was your last doctors appointment? --   " not sure"    Communicated ALLIE with " patient/caregiver Yes     Reason For Admission Hyperglycemia     People in Home facility resident     [facility]     Do you expect to return to your current living situation? Yes     Do you have help at home or someone to help you manage your care at home? Yes     Who are your caregiver(s) and their phone number(s)? n/h staff/ph# [phone]     Prior to hospitilization cognitive status: Unable to Assess     Current cognitive status: Unable to Assess     Walking or Climbing Stairs transferring difficulty, dependent     Mobility Management wheelchair     Dressing/Bathing dressing difficulty, assistance 1 person     Dressing/Bathing Management patient is a nursing home resident     Equipment Currently Used at Home other (see comments)   Patient is nursing home resident    Patient currently being followed by outpatient case management? No     Do you currently have service(s) that help you manage your care at home? Yes     Name and Contact number [facility]/ph# [phone]     Is the pt/caregiver preference to resume services with current agency Yes     Do you take prescription medications? Yes     Do you have prescription coverage? Yes     Coverage Medicare A & B and Medicaid of [place].     Do you have any problems affording any of your prescribed medications? No     Is the patient taking medications as prescribed? yes     Who is going to help you get home at discharge? EMS     How do you get to doctors appointments? other (see comments)   n/h resident    Are you on dialysis? No     Do you take coumadin? No     Discharge Plan A Return to nursing home     Discharge Plan B Skilled Nursing Facility     Discharge Plan discussed with: Adult children     Discharge Barriers Identified None        Physical Activity    On average, how many days per week do you engage in moderate to strenuous exercise (like a brisk walk)? Patient refused     On  average, how many minutes do you engage in exercise at this level? Patient refused        Financial Resource Strain    How hard is it for you to pay for the very basics like food, housing, medical care, and heating? Not hard at all        Housing Stability    In the last 12 months, was there a time when you were not able to pay the mortgage or rent on time? Yes     In the last 12 months, how many places have you lived? 1     In the last 12 months, was there a time when you did not have a steady place to sleep or slept in a shelter (including now)? No        Transportation Needs    In the past 12 months, has lack of transportation kept you from medical appointments or from getting medications? No     In the past 12 months, has lack of transportation kept you from meetings, work, or from getting things needed for daily living? Patient refused        Social Connections    Are you , , , , never , or living with a partner?         Alcohol Use    Q2: How many drinks containing alcohol do you have on a typical day when you are drinking? Patient does not drink        OTHER    Name(s) of People in Home Pt is a resident @ [facility]                   Spoke to jose Nava.  Patient is a jail resident @ [facility]. Post hospital stay nursing home staff will be his support person and help in the home.  Patient has transportation at discharge with EMS.  There have been no hospitalizations within the last 30 days per daughter. Verified patient's PCP and preferred Pharmacy.  Daughter states not on Coumadin and is not receiving dialysis.  All questions answered regarding Case Management Discharge Planning, Basia Herrera verbalized understanding.  SW will continue to follow while patient remains on MICU and assist with post acute care discharge needs.    Estee Fang LMSW  [facility]  [phone]

## 2023-03-23 NOTE — ASSESSMENT & PLAN NOTE
- DKA/HHS Pathway initiated  - Blood sugar on arrival 795 with a bicarb 6, anion gap 35, BHB 5.3 and pH 7.093 on VBG  - HbA1c 8.9  - Likely induced by sepsis with suspected source UTI vs. PNA vs. Skin/soft tissue infection  - Home DM regimen: Aspart 5u TID, Detemir 6u qd  - Start insulin gtt per protocol with q1h accuchecks while on the drip.    - Once Bicarb >18, Anion gap <10, Glucose <200 on 2 readings and able to tolerate PO intake without nausea and vomiting, transition to subq insulin with a 1-2 h overlap with drip.   · Long acting insulin dose:  Detemir (0.25mg/kg) daily or in 2 doses  · Short acting insulin dose:  Aspart (0.08mg/kg) units per meal  - Start normal saline at 125cc/hr.  Once blood sugar is 200, change IVF to D5 1/2 NS at 50cc/h  - Monitor electrolytes with BMP q4h while on insulin gtt and place on tele  - Bariatric clear liquid diet while on insulin gtt then change to Diabetic diet when initiating subq insulin with accuchecks 4x daily before meals and at bedtime (AC and HS)

## 2023-03-24 PROBLEM — J15.9 BACTERIAL PNEUMONIA: Status: RESOLVED | Noted: 2022-04-22 | Resolved: 2023-03-24

## 2023-03-24 PROBLEM — Z71.89 GOALS OF CARE, COUNSELING/DISCUSSION: Status: RESOLVED | Noted: 2022-04-12 | Resolved: 2023-03-24

## 2023-03-24 PROBLEM — R78.81 POSITIVE BLOOD CULTURES: Status: RESOLVED | Noted: 2022-10-06 | Resolved: 2023-03-24

## 2023-03-24 PROBLEM — J43.2 CENTRILOBULAR EMPHYSEMA: Chronic | Status: ACTIVE | Noted: 2018-12-12

## 2023-03-24 PROBLEM — Z86.73 HISTORY OF EMBOLIC STROKE: Chronic | Status: ACTIVE | Noted: 2022-01-12

## 2023-03-24 PROBLEM — Z86.69 HISTORY OF PARTIAL SEIZURES: Chronic | Status: ACTIVE | Noted: 2022-01-26

## 2023-03-24 PROBLEM — Z79.4 TYPE 2 DIABETES MELLITUS WITH DIABETIC PERIPHERAL ANGIOPATHY WITHOUT GANGRENE, WITH LONG-TERM CURRENT USE OF INSULIN: Chronic | Status: ACTIVE | Noted: 2022-01-05

## 2023-03-24 PROBLEM — E11.51 TYPE 2 DIABETES MELLITUS WITH DIABETIC PERIPHERAL ANGIOPATHY WITHOUT GANGRENE, WITH LONG-TERM CURRENT USE OF INSULIN: Chronic | Status: ACTIVE | Noted: 2022-01-05

## 2023-03-24 PROBLEM — R11.0 NAUSEA: Status: RESOLVED | Noted: 2022-04-26 | Resolved: 2023-03-24

## 2023-03-24 PROBLEM — E11.69 DYSLIPIDEMIA ASSOCIATED WITH TYPE 2 DIABETES MELLITUS: Chronic | Status: ACTIVE | Noted: 2022-04-23

## 2023-03-24 PROBLEM — N39.0 UTI (URINARY TRACT INFECTION): Status: RESOLVED | Noted: 2023-02-28 | Resolved: 2023-03-24

## 2023-03-24 PROBLEM — Z75.8 DISCHARGE PLANNING ISSUES: Status: RESOLVED | Noted: 2022-03-02 | Resolved: 2023-03-24

## 2023-03-24 PROBLEM — E78.5 DYSLIPIDEMIA ASSOCIATED WITH TYPE 2 DIABETES MELLITUS: Chronic | Status: ACTIVE | Noted: 2022-04-23

## 2023-03-24 PROBLEM — Z78.9 NURSING HOME RESIDENT: Chronic | Status: ACTIVE | Noted: 2023-03-24

## 2023-03-24 PROBLEM — E87.20 LACTIC ACIDOSIS: Status: RESOLVED | Noted: 2022-04-12 | Resolved: 2023-03-24

## 2023-03-24 PROBLEM — E11.00 HYPEROSMOLAR HYPERGLYCEMIC STATE (HHS): Status: RESOLVED | Noted: 2022-01-29 | Resolved: 2023-03-24

## 2023-03-24 LAB
ALBUMIN SERPL BCP-MCNC: 2.6 G/DL (ref 3.5–5.2)
ALP SERPL-CCNC: 155 U/L (ref 55–135)
ALT SERPL W/O P-5'-P-CCNC: 24 U/L (ref 10–44)
ANION GAP SERPL CALC-SCNC: 13 MMOL/L (ref 8–16)
AST SERPL-CCNC: 35 U/L (ref 10–40)
BASOPHILS # BLD AUTO: 0.02 K/UL (ref 0–0.2)
BASOPHILS NFR BLD: 0.2 % (ref 0–1.9)
BILIRUB SERPL-MCNC: 0.3 MG/DL (ref 0.1–1)
BUN SERPL-MCNC: 22 MG/DL (ref 8–23)
CALCIUM SERPL-MCNC: 8.8 MG/DL (ref 8.7–10.5)
CHLORIDE SERPL-SCNC: 111 MMOL/L (ref 95–110)
CO2 SERPL-SCNC: 22 MMOL/L (ref 23–29)
CREAT SERPL-MCNC: 1.7 MG/DL (ref 0.5–1.4)
DIFFERENTIAL METHOD: ABNORMAL
EOSINOPHIL # BLD AUTO: 0 K/UL (ref 0–0.5)
EOSINOPHIL NFR BLD: 0.4 % (ref 0–8)
ERYTHROCYTE [DISTWIDTH] IN BLOOD BY AUTOMATED COUNT: 14.8 % (ref 11.5–14.5)
EST. GFR  (NO RACE VARIABLE): 40.5 ML/MIN/1.73 M^2
GLUCOSE SERPL-MCNC: 174 MG/DL (ref 70–110)
HCT VFR BLD AUTO: 32.7 % (ref 40–54)
HGB BLD-MCNC: 10.2 G/DL (ref 14–18)
IMM GRANULOCYTES # BLD AUTO: 0.03 K/UL (ref 0–0.04)
IMM GRANULOCYTES NFR BLD AUTO: 0.3 % (ref 0–0.5)
LYMPHOCYTES # BLD AUTO: 1 K/UL (ref 1–4.8)
LYMPHOCYTES NFR BLD: 9.5 % (ref 18–48)
MAGNESIUM SERPL-MCNC: 2 MG/DL (ref 1.6–2.6)
MCH RBC QN AUTO: 28 PG (ref 27–31)
MCHC RBC AUTO-ENTMCNC: 31.2 G/DL (ref 32–36)
MCV RBC AUTO: 90 FL (ref 82–98)
MONOCYTES # BLD AUTO: 0.7 K/UL (ref 0.3–1)
MONOCYTES NFR BLD: 6.6 % (ref 4–15)
NEUTROPHILS # BLD AUTO: 8.7 K/UL (ref 1.8–7.7)
NEUTROPHILS NFR BLD: 83 % (ref 38–73)
NRBC BLD-RTO: 0 /100 WBC
PHOSPHATE SERPL-MCNC: 3.2 MG/DL (ref 2.7–4.5)
PLATELET # BLD AUTO: 233 K/UL (ref 150–450)
PMV BLD AUTO: 9.6 FL (ref 9.2–12.9)
POCT GLUCOSE: 122 MG/DL (ref 70–110)
POCT GLUCOSE: 122 MG/DL (ref 70–110)
POCT GLUCOSE: 136 MG/DL (ref 70–110)
POCT GLUCOSE: 203 MG/DL (ref 70–110)
POTASSIUM SERPL-SCNC: 3.6 MMOL/L (ref 3.5–5.1)
PROT SERPL-MCNC: 5.9 G/DL (ref 6–8.4)
RBC # BLD AUTO: 3.64 M/UL (ref 4.6–6.2)
SODIUM SERPL-SCNC: 146 MMOL/L (ref 136–145)
VANCOMYCIN SERPL-MCNC: 10.2 UG/ML
WBC # BLD AUTO: 10.44 K/UL (ref 3.9–12.7)

## 2023-03-24 PROCEDURE — 99233 SBSQ HOSP IP/OBS HIGH 50: CPT | Mod: GC,,, | Performed by: INTERNAL MEDICINE

## 2023-03-24 PROCEDURE — 83735 ASSAY OF MAGNESIUM: CPT

## 2023-03-24 PROCEDURE — 63600175 PHARM REV CODE 636 W HCPCS

## 2023-03-24 PROCEDURE — 25000003 PHARM REV CODE 250

## 2023-03-24 PROCEDURE — 63600175 PHARM REV CODE 636 W HCPCS: Performed by: INTERNAL MEDICINE

## 2023-03-24 PROCEDURE — 99233 PR SUBSEQUENT HOSPITAL CARE,LEVL III: ICD-10-PCS | Mod: GC,,, | Performed by: INTERNAL MEDICINE

## 2023-03-24 PROCEDURE — 85025 COMPLETE CBC W/AUTO DIFF WBC: CPT

## 2023-03-24 PROCEDURE — 11000001 HC ACUTE MED/SURG PRIVATE ROOM

## 2023-03-24 PROCEDURE — 80202 ASSAY OF VANCOMYCIN: CPT | Performed by: INTERNAL MEDICINE

## 2023-03-24 PROCEDURE — 94761 N-INVAS EAR/PLS OXIMETRY MLT: CPT

## 2023-03-24 PROCEDURE — 84100 ASSAY OF PHOSPHORUS: CPT

## 2023-03-24 PROCEDURE — 80053 COMPREHEN METABOLIC PANEL: CPT | Performed by: INTERNAL MEDICINE

## 2023-03-24 RX ORDER — HEPARIN SODIUM 5000 [USP'U]/ML
5000 INJECTION, SOLUTION INTRAVENOUS; SUBCUTANEOUS EVERY 8 HOURS
Status: DISCONTINUED | OUTPATIENT
Start: 2023-03-24 | End: 2023-03-26

## 2023-03-24 RX ORDER — TAMSULOSIN HYDROCHLORIDE 0.4 MG/1
1 CAPSULE ORAL DAILY
Status: DISCONTINUED | OUTPATIENT
Start: 2023-03-25 | End: 2023-03-28 | Stop reason: HOSPADM

## 2023-03-24 RX ORDER — PANTOPRAZOLE SODIUM 40 MG/1
40 TABLET, DELAYED RELEASE ORAL DAILY
Status: DISCONTINUED | OUTPATIENT
Start: 2023-03-25 | End: 2023-03-28 | Stop reason: HOSPADM

## 2023-03-24 RX ORDER — ATORVASTATIN CALCIUM 40 MG/1
40 TABLET, FILM COATED ORAL DAILY
Status: DISCONTINUED | OUTPATIENT
Start: 2023-03-25 | End: 2023-03-28 | Stop reason: HOSPADM

## 2023-03-24 RX ORDER — APIXABAN 5 MG/1
5 TABLET, FILM COATED ORAL 2 TIMES DAILY
COMMUNITY
Start: 2023-03-09

## 2023-03-24 RX ORDER — POTASSIUM CHLORIDE 750 MG/1
30 CAPSULE, EXTENDED RELEASE ORAL ONCE
Status: COMPLETED | OUTPATIENT
Start: 2023-03-24 | End: 2023-03-24

## 2023-03-24 RX ORDER — ONDANSETRON 4 MG/1
4 TABLET, FILM COATED ORAL EVERY 6 HOURS PRN
COMMUNITY
Start: 2023-01-30

## 2023-03-24 RX ORDER — TAMSULOSIN HYDROCHLORIDE 0.4 MG/1
1 CAPSULE ORAL DAILY
COMMUNITY
Start: 2023-03-01

## 2023-03-24 RX ORDER — SUCRALFATE 1 G/1
1 TABLET ORAL
COMMUNITY
Start: 2023-03-12

## 2023-03-24 RX ORDER — INSULIN GLARGINE 100 [IU]/ML
INJECTION, SOLUTION SUBCUTANEOUS
Status: ON HOLD | COMMUNITY
Start: 2023-03-03 | End: 2024-03-20 | Stop reason: HOSPADM

## 2023-03-24 RX ORDER — LACOSAMIDE 100 MG/1
100 TABLET ORAL EVERY 12 HOURS
Status: DISCONTINUED | OUTPATIENT
Start: 2023-03-24 | End: 2023-03-28 | Stop reason: HOSPADM

## 2023-03-24 RX ORDER — AMIODARONE HYDROCHLORIDE 200 MG/1
200 TABLET ORAL DAILY
Status: DISCONTINUED | OUTPATIENT
Start: 2023-03-25 | End: 2023-03-28 | Stop reason: HOSPADM

## 2023-03-24 RX ORDER — METFORMIN HYDROCHLORIDE 500 MG/1
500 TABLET ORAL 2 TIMES DAILY
Status: ON HOLD | COMMUNITY
Start: 2023-03-22 | End: 2024-03-20 | Stop reason: HOSPADM

## 2023-03-24 RX ADMIN — HEPARIN SODIUM 5000 UNITS: 5000 INJECTION INTRAVENOUS; SUBCUTANEOUS at 02:03

## 2023-03-24 RX ADMIN — PIPERACILLIN AND TAZOBACTAM 4.5 G: 4; .5 INJECTION, POWDER, LYOPHILIZED, FOR SOLUTION INTRAVENOUS; PARENTERAL at 08:03

## 2023-03-24 RX ADMIN — INSULIN ASPART 5 UNITS: 100 INJECTION, SOLUTION INTRAVENOUS; SUBCUTANEOUS at 05:03

## 2023-03-24 RX ADMIN — LACOSAMIDE 100 MG: 50 TABLET, FILM COATED ORAL at 08:03

## 2023-03-24 RX ADMIN — HEPARIN SODIUM 5000 UNITS: 5000 INJECTION INTRAVENOUS; SUBCUTANEOUS at 09:03

## 2023-03-24 RX ADMIN — INSULIN ASPART 5 UNITS: 100 INJECTION, SOLUTION INTRAVENOUS; SUBCUTANEOUS at 08:03

## 2023-03-24 RX ADMIN — CEFTRIAXONE 1 G: 1 INJECTION, POWDER, FOR SOLUTION INTRAMUSCULAR; INTRAVENOUS at 05:03

## 2023-03-24 RX ADMIN — INSULIN ASPART 5 UNITS: 100 INJECTION, SOLUTION INTRAVENOUS; SUBCUTANEOUS at 12:03

## 2023-03-24 RX ADMIN — POTASSIUM CHLORIDE 30 MEQ: 10 CAPSULE, COATED, EXTENDED RELEASE ORAL at 03:03

## 2023-03-24 RX ADMIN — HEPARIN SODIUM 5000 UNITS: 5000 INJECTION INTRAVENOUS; SUBCUTANEOUS at 05:03

## 2023-03-24 RX ADMIN — INSULIN DETEMIR 6 UNITS: 100 INJECTION, SOLUTION SUBCUTANEOUS at 09:03

## 2023-03-24 NOTE — PROGRESS NOTES
"[facility] - Cardiac Medical ICU  Critical Care Medicine  Progress Note    Patient Name: Kamar Muñoz  MRN: [id number]  Admission Date: 3/23/2023  Hospital Length of Stay: 1 days  Code Status: DNR  Attending Provider: Giovanni Reinoso MD  Primary Care Provider: Basim Guerrero MD   Principal Problem: Diabetic ketoacidosis without coma associated with type 2 diabetes mellitus    Subjective:     HPI:  76 y/o M with T2DM (last A1C 8.9) complicated by peripheral neuropathy, pAF (on apixaban), seizure disorder, HLD, previous CVA, and CAD s/p PCI to RCA (on clopidogrel) who presented from [facility] with waxing and waning mentation, weakness, nausea and vomiting, increased urinary frequency that started 4-5 days ago. He also has shortness of breath associated with shoulder pain from a recent clavicle fracture and a history of diarrhea (3 mo) that started after he started chronic antibiotic use for a sacral decubitus ulcer he developed from being bed-bound after debility from his MI and subsequent CVA last year. He recently fell out of his bed at the NH and sustained a clavicle fracture for which he doesn't have an adequate sling. Patient only able to answer yes/no questions, so collateral provided by his daughter, Basia, at the bedside. Basia says she's unsure if patient gets his insulin regimen at the NH. A nurse at the facility recently said his BG was "all over the place". A1C has improved from 10 to 8 in recent months. His LUE is bandaged from a skin graft site used to cover an excised skin cancer that was removed on Monday.     In the ED patient started on insulin ggt for , K 5.2, AG 35, BHB 5.3. CXR with concern for LLL PNA, UA not drawn due to anuria in setting of poor po intake, N/V, CT head non-contributory. Blood cultures drawn. Started on broad spectrum antibiotics. Admitted to the MICU for DKA management.       Hospital/ICU Course:  DKA resolved with closure of gap, BG<200, no longer " acidotic. Tolerating PO. Wound care provided for sacral decub ulcer, scalp excision with skin graft.      Interval History/Significant Events: NAEON, BG well controlled. Mentation improved, but still intermittently confused.     Review of Systems   Constitutional:  Positive for fatigue. Negative for activity change, appetite change and fever.   Respiratory:  Negative for cough, shortness of breath and wheezing.    Cardiovascular:  Negative for chest pain and leg swelling.   Gastrointestinal:  Negative for abdominal pain, constipation, diarrhea, nausea and vomiting.   Skin:  Negative for rash.   Neurological:  Negative for headaches.   Psychiatric/Behavioral:  Positive for decreased concentration.    Objective:     Vital Signs (Most Recent):  Temp: 98.2 °F (36.8 °C) (03/24/23 1500)  Pulse: 78 (03/24/23 1500)  Resp: 20 (03/24/23 1500)  BP: (!) 123/56 (03/24/23 1500)  SpO2: 96 % (03/24/23 1500)   Vital Signs (24h Range):  Temp:  [96.1 °F (35.6 °C)-98.2 °F (36.8 °C)] 98.2 °F (36.8 °C)  Pulse:  [70-78] 78  Resp:  [10-21] 20  SpO2:  [91 %-98 %] 96 %  BP: (112-148)/(45-72) 123/56   Weight: 74.8 kg (164 lb 14.5 oz)  Body mass index is 21.17 kg/m².      Intake/Output Summary (Last 24 hours) at 3/24/2023 1652  Last data filed at 3/24/2023 1500  Gross per 24 hour   Intake 1366.24 ml   Output 870 ml   Net 496.24 ml       Physical Exam  Vitals and nursing note reviewed.   HENT:      Head: Normocephalic and atraumatic.   Eyes:      Extraocular Movements: Extraocular movements intact.      Conjunctiva/sclera: Conjunctivae normal.   Cardiovascular:      Rate and Rhythm: Normal rate and regular rhythm.      Pulses: Normal pulses.   Pulmonary:      Effort: Pulmonary effort is normal. No respiratory distress.      Breath sounds: No wheezing or rales.   Abdominal:      Palpations: Abdomen is soft.      Tenderness: There is no abdominal tenderness. There is no guarding.   Musculoskeletal:         General: Signs of injury (Clavicle  fracture) present.      Cervical back: Tenderness (R clavicular tenderness to palpation) present.      Right lower leg: No edema.      Left lower leg: No edema.   Skin:     General: Skin is warm and dry.      Findings: Lesion (LUE bandaged from skin graft, Scalp bandage from excision with skin graft) present.   Neurological:      General: No focal deficit present.      Mental Status: He is alert and oriented to person, place, and time.   Psychiatric:         Mood and Affect: Mood normal.       Vents:     Lines/Drains/Airways       Drain  Duration             Male External Urinary Catheter 03/23/23 0758 Medium 1 day              Peripheral Intravenous Line  Duration                  Peripheral IV - Single Lumen 03/23/23 0804 20 G Right Antecubital 1 day         Peripheral IV - Single Lumen 03/23/23 0805 20 G Right Forearm 1 day                  Significant Labs:    CBC/Anemia Profile:  Recent Labs   Lab 03/23/23  0716 03/23/23  1107 03/24/23  0419   WBC 8.64  --  10.44   HGB 11.1*  --  10.2*   HCT 36.6* 34* 32.7*     --  233   MCV 95  --  90   RDW 14.7*  --  14.8*        Chemistries:  Recent Labs   Lab 03/23/23  0716 03/23/23  1218 03/23/23  1544 03/23/23  2113 03/24/23  0419    142 146* 145 146*   K 5.2* 3.5 4.6 3.4* 3.6   CL 99 107 116* 108 111*   CO2 6* 14* 22* 28 22*   BUN 28* 26* 24* 24* 22   CREATININE 2.3* 2.1* 1.7* 1.7* 1.7*   CALCIUM 8.8 8.8 7.8* 9.0 8.8   ALBUMIN 2.9*  --   --   --  2.6*   PROT 6.9  --   --   --  5.9*   BILITOT 0.3  --   --   --  0.3   ALKPHOS 177*  --   --   --  155*   ALT 28  --   --   --  24   AST 28  --   --   --  35   MG 2.1  --   --   --  2.0   PHOS 7.4* 2.5* 1.8*  --  3.2       All pertinent labs within the past 24 hours have been reviewed.    Significant Imaging:  I have reviewed all pertinent imaging results/findings within the past 24 hours.      ABG  Recent Labs   Lab 03/23/23  0727   PH 7.093*   PO2 45   PCO2 26.5*   HCO3 8.1*   BE -22     Assessment/Plan:      Neuro  History of partial seizures  - resume home Vimpat BID    Encephalopathy, metabolic  In the setting of sepsis/DKA. Continue to monitor, replete electrolytes. Some concern for dementia, pseudodementia/depression 2/2 his wife of 60 years passing away last year, abruptly quitting working after MI/CVA, and transition to nursing facility.    - Delirium precautions  - Fall precautions    History of embolic stroke  Resume home lipitor    Cardiac/Vascular  Paroxysmal atrial fibrillation   Hold home Toprol, Cardizem, and amiodarone given hypotension. Not on AC. [id number] Score: 4 HASBLED 4.    - Resume amiodarone            Coronary artery disease involving native coronary artery of native heart with unstable angina pectoris  Resume home Lipitor    Essential hypertension  Hold antihypertensives 2/2 sepsis, currently normotensive    Renal/  BRENDAN (acute kidney injury)  Cr 2.3 (baseline 1.1).  Patient dry on physical exam.  Poor oral intake, most likely prerenal in origin.    Recent Labs   Lab 03/23/23  0716 03/23/23  1218    142   K 5.2* 3.5   CL 99 107   CO2 6* 14*   BUN 28* 26*   CREATININE 2.3* 2.1*       Plan:  - strict I/O  - daily weights  - encourage PO intake  - CMP qd, trend Cr  - renally dose all medications  - avoid nephrotoxic agents, NSAIDs, IV contrast, ACE/ARB    Endocrine  Type 2 diabetes mellitus with hyperglycemia, with long-term current use of insulin  - Now on home regimen 6u BID detemir and 5u TIDWM aspart.    Orthopedic  Pressure injury of buttock, unstageable  See altered skin integrity    Alteration in skin integrity  Decubitus sacral ulcer 3 mo per daughter from being bed-bound.    - Wound care consulted with appreciated recs for care  - Reaching out to dermatology for assistance with LUE graft care, uncertain of provider who completed procedure (not in chart review)       Critical Care Daily Checklist:    A: Awake: RASS Goal/Actual Goal: RASS Goal: 0-->alert and calm  Actual:  Long Agitation Sedation Scale (RASS): Drowsy   B: Spontaneous Breathing Trial Performed?     C: SAT & SBT Coordinated?  Not applicable                      D: Delirium: CAM-ICU Overall CAM-ICU: Positive   E: Early Mobility Performed? Yes   F: Feeding Goal:    Status:     Current Diet Order   Procedures    Diet diabetic [facility]; 2000 Calorie     Order Specific Question:   Indicate patient location for additional diet options:     Answer:   [facility]     Order Specific Question:   Total calories:     Answer:   2000 Calorie      AS: Analgesia/Sedation Tylenol    T: Thromboembolic Prophylaxis Heparin   H: HOB > 300 Yes   U: Stress Ulcer Prophylaxis (if needed) On home PPI   G: Glucose Control At goal   B: Bowel Function Stool Occurrence: 1   I: Indwelling Catheter (Lines & Mccord) Necessity PIV   D: De-escalation of Antimicrobials/Pharmacotherapies DC Vanc, continue CTX for suspected UTI, follow BCx, UA    Plan for the day/ETD Stepdown    Code Status:  Family/Goals of Care: DNR  Discussed with daughter       Critical secondary to Patient has a condition that poses threat to life and bodily function: DKA, resolved      Critical care was time spent personally by me on the following activities: development of treatment plan with patient or surrogate and bedside caregivers, discussions with consultants, evaluation of patient's response to treatment, examination of patient, ordering and performing treatments and interventions, ordering and review of laboratory studies, ordering and review of radiographic studies, pulse oximetry, re-evaluation of patient's condition. This critical care time did not overlap with that of any other provider or involve time for any procedures.     Gatito Cesar MD  Critical Care Medicine  [facility] - Cardiac Medical ICU

## 2023-03-24 NOTE — HOSPITAL COURSE
Diabetic ketoacidosis was treated until anion gap closed. BUN and creatinine improved to 22 and 1.7. he was put on scheduled insulin detemir and aspart. Antibiotics were changed to ceftriaxone. Wound Care recommended caring for sacrum, buttocks, and scrotum with soap and water, patting dry, and applying triad ointment twice daily and as needed. He was stable for stepdown from the ICU on 3/24/2023 but could not get a floor bed until 3/27/2023. In the meantime, his antibiotic was changed to trimethoprim-sulfamethoxazole and preparations were made for discharge back to his nursing home on Monday 3/27/2023. His daughter asked why he had frequent urinary tract infections. Nursing home staff reported that he drinks a lot of soft drinks and not much water.

## 2023-03-24 NOTE — CONSULTS
Nutrition-Related Diabetes Education      Time Spent: 5 minutes     Learners: Patient     Current HbA1c: 8.9    Is patient aware of their A1c and their goal A1c? Yes    Nutrition Education with handouts: MyPlate, CHO counting     Comments: Pt reports he isn't familiar with the diabetic diet, although pt educated x 7 in 1 year. Provided and explained handout detailing sources of carbohydrates, appropriate serving sizes, and the plate method for meal planning. Pt voiced understanding. All questions and concerns answered.    Pt reports good appetite PTA/currently & UBW of 165#; chart review confirms. No indicators of malnutrition noted.    Barriers to Learning: Non-compliance, pt resides in NH    Follow up: Yes    Please consult as needed.    Thank you!  Baylee MS, RD, LDN

## 2023-03-24 NOTE — HOSPITAL COURSE
DKA resolved with closure of gap, BG<200, no longer acidotic. Tolerating PO. Wound care provided for sacral decub ulcer, scalp excision with skin graft. Mentation improving over course of admission. Insulin regimen adjusted based on hospital needs, discharged to NH with empiric coverage for UTI given no history of MDRO.

## 2023-03-24 NOTE — PLAN OF CARE
"Hospital Medicine Team B  ICU stepdown acceptance note    Kamar Muñoz is a 77 year old white man with hypertension, diabetes mellitus type 2 (treated with insulin) with peripheral neuropathy, coronary artery disease with history of ST elevation myocardial infarction status post percutaneous coronary intervention with stent placements in mid right coronary artery on 1/9/2022, paroxysmal atrial fibrillation, history of embolic stroke in right frontal lobe and left cerebellar hemisphere on 1/12/2022, history of partial seizures, sacral decubitus ulcer, history of L1 lumbar compression fracture status post percutaneous vertebral body augmentation on 2/1/2023, history of right clavicle fracture on 2/27/2023. He lives at [facility] in [place], [place]. His primary care physician is Dr. Basim Guerrero.   He presented to [facility] Emergency [facility] on 3/23/2023 with waxing and waning mentation, weakness, nausea, vomiting, and increased urinary frequency that started 4 to 5 days ago. He also had shortness of breath associated with his recent clavicle fracture and a history of diarrhea for 3 months that started after starting a chronic antibiotic to treat a sacral decubitus ulcer he developed from being bed bound due debility from his myocardial infarction and subsequent stroke. He did not have an adequate sling for his clavicle fracture. He was only able to answer yes/no questions, so collateral information was provided by his daughter Basia on admission. Basia said she was unsure if he was getting his prescribed insulin regimen at the nursing home. A nurse at the nursing home recently said his blood glucose was "all over the place", although his hemoglobin A1c had improved from 10.6% on 10/5/2022 to 8.9% on 2/28/2023. His left upper extremity was bandaged from a skin graft site used to cover an excised skin cancer removed on 3/20/2023. His blood glucose was 795 " mg/dL in the emergency department, with an anion gap of 35 and beta-hydroxybutyrate of 5.3 mmol/L, lactate of 6.2 mmol/L, BUN and creatinine of 28 mg/dL and 2.3 mg/dL (from 18 and 1.0 on 3/1/2023), and potassium of 5.2 mmol/L. Chest X-ray suggested left lower lobe pneumonia. He had no urine to obtain urinalysis. He was put on broad spectrum antibiotics. He was admitted to Critical Care Medicine.    Diabetic ketoacidosis was treated until anion gap closed. BUN and creatinine improved to 22 and 1.7. he was put on scheduled insulin detemir and aspart. Antibiotics were changed to ceftriaxone. Wound Care recommended caring for sacrum, buttocks, and scrotum with soap and water, patting dry, and applying triad ointment twice daily and as needed.     CT Head Without Contrast 3/23/23: FINDINGS:   Intracranial compartment:   Large remote right frontal infarct (MCA distribution) moderate-sized remote left cerebellar infarct.  Mild chronic small vessel ischemic changes.  No new territorial infarct.  No acute parenchymal hemorrhage no new intracranial mass effect or midline shift.   Stable pattern of cerebral volume loss with prominence of the ventricles and sulci.  No evidence of hydrocephalus.   No extra-axial blood or fluid collections.   Skull/extracranial contents (limited evaluation):   Right parietal extracranial soft tissue swelling with subcutaneous emphysema in keeping with the laceration.  Overlying bandage material in place.  No evidence of an associated calvarial fracture.   The mastoid air cells and visualized paranasal sinuses are essentially clear.   Impression:  Extracranial soft tissue swelling/laceration without evidence of underlying fracture or acute intracranial hemorrhage.   X-Ray Chest AP Portable 3/23/23: FINDINGS:   The lungs appear hyperinflated.  Pulmonary interstitial and vascular markings may be rendered increasing conspicuity by emphysema.  There is mild pulmonary venous congestion.  There is  increased radiodensity of the left lower lung zone, concerning for a developing consolidation.   There is a vague 1.5 cm radiodensity over the left upper lung zone, which on a CT 04/18/2022 appears to correlate to hypertrophic changes of the left costo manubrial junction.   The heart size appears normal.  There is mild calcification of the aortic knob consistent with atherosclerotic stigmata.   The bones appear unremarkable for the age of the patient.

## 2023-03-24 NOTE — SUBJECTIVE & OBJECTIVE
Interval History/Significant Events: NAEON, BG well controlled. Mentation improved, but still intermittently confused.     Review of Systems   Constitutional:  Positive for fatigue. Negative for activity change, appetite change and fever.   Respiratory:  Negative for cough, shortness of breath and wheezing.    Cardiovascular:  Negative for chest pain and leg swelling.   Gastrointestinal:  Negative for abdominal pain, constipation, diarrhea, nausea and vomiting.   Skin:  Negative for rash.   Neurological:  Negative for headaches.   Psychiatric/Behavioral:  Positive for decreased concentration.    Objective:     Vital Signs (Most Recent):  Temp: 98.2 °F (36.8 °C) (03/24/23 1500)  Pulse: 78 (03/24/23 1500)  Resp: 20 (03/24/23 1500)  BP: (!) 123/56 (03/24/23 1500)  SpO2: 96 % (03/24/23 1500)   Vital Signs (24h Range):  Temp:  [96.1 °F (35.6 °C)-98.2 °F (36.8 °C)] 98.2 °F (36.8 °C)  Pulse:  [70-78] 78  Resp:  [10-21] 20  SpO2:  [91 %-98 %] 96 %  BP: (112-148)/(45-72) 123/56   Weight: 74.8 kg (164 lb 14.5 oz)  Body mass index is 21.17 kg/m².      Intake/Output Summary (Last 24 hours) at 3/24/2023 1652  Last data filed at 3/24/2023 1500  Gross per 24 hour   Intake 1366.24 ml   Output 870 ml   Net 496.24 ml       Physical Exam  Vitals and nursing note reviewed.   HENT:      Head: Normocephalic and atraumatic.   Eyes:      Extraocular Movements: Extraocular movements intact.      Conjunctiva/sclera: Conjunctivae normal.   Cardiovascular:      Rate and Rhythm: Normal rate and regular rhythm.      Pulses: Normal pulses.   Pulmonary:      Effort: Pulmonary effort is normal. No respiratory distress.      Breath sounds: No wheezing or rales.   Abdominal:      Palpations: Abdomen is soft.      Tenderness: There is no abdominal tenderness. There is no guarding.   Musculoskeletal:         General: Signs of injury (Clavicle fracture) present.      Cervical back: Tenderness (R clavicular tenderness to palpation) present.      Right  lower leg: No edema.      Left lower leg: No edema.   Skin:     General: Skin is warm and dry.      Findings: Lesion (LUE bandaged from skin graft, Scalp bandage from excision with skin graft) present.   Neurological:      General: No focal deficit present.      Mental Status: He is alert and oriented to person, place, and time.   Psychiatric:         Mood and Affect: Mood normal.       Vents:     Lines/Drains/Airways       Drain  Duration             Male External Urinary Catheter 03/23/23 0758 Medium 1 day              Peripheral Intravenous Line  Duration                  Peripheral IV - Single Lumen 03/23/23 0804 20 G Right Antecubital 1 day         Peripheral IV - Single Lumen 03/23/23 0805 20 G Right Forearm 1 day                  Significant Labs:    CBC/Anemia Profile:  Recent Labs   Lab 03/23/23  0716 03/23/23  1107 03/24/23  0419   WBC 8.64  --  10.44   HGB 11.1*  --  10.2*   HCT 36.6* 34* 32.7*     --  233   MCV 95  --  90   RDW 14.7*  --  14.8*        Chemistries:  Recent Labs   Lab 03/23/23  0716 03/23/23  1218 03/23/23  1544 03/23/23  2113 03/24/23  0419    142 146* 145 146*   K 5.2* 3.5 4.6 3.4* 3.6   CL 99 107 116* 108 111*   CO2 6* 14* 22* 28 22*   BUN 28* 26* 24* 24* 22   CREATININE 2.3* 2.1* 1.7* 1.7* 1.7*   CALCIUM 8.8 8.8 7.8* 9.0 8.8   ALBUMIN 2.9*  --   --   --  2.6*   PROT 6.9  --   --   --  5.9*   BILITOT 0.3  --   --   --  0.3   ALKPHOS 177*  --   --   --  155*   ALT 28  --   --   --  24   AST 28  --   --   --  35   MG 2.1  --   --   --  2.0   PHOS 7.4* 2.5* 1.8*  --  3.2       All pertinent labs within the past 24 hours have been reviewed.    Significant Imaging:  I have reviewed all pertinent imaging results/findings within the past 24 hours.

## 2023-03-24 NOTE — PLAN OF CARE
CMICU DAILY GOALS       A: Awake    RASS: Goal - RASS Goal: 0-->alert and calm  Actual - RASS (Long Agitation-Sedation Scale): alert and calm   Restraint necessity:    B: Breathe   SBT: Not intubated   C: Coordinate A & B, analgesics/sedatives   Pain: managed    SAT: Not intubated  D: Delirium   CAM-ICU: Overall CAM-ICU: Positive  E: Early(intubated/ Progressive (non-intubated) Mobility   MOVE Screen: Pass   Activity: Activity Management: Patient unable to perform activities  FAS: Feeding/Nutrition   Diet order: Diet/Nutrition Received: consistent carb/diabetic diet,    T: Thrombus   DVT prophylaxis: VTE Required Core Measure: Pharmacological prophylaxis initiated/maintained  H: HOB Elevation   Head of Bed (HOB) Positioning: HOB elevated  U: Ulcer Prophylaxis   GI: no  G: Glucose control   managed Glycemic Management: blood glucose monitored  S: Skin   Bathing/Skin Care: bath, partial, linen changed, incontinence care  Device Skin Pressure Protection: absorbent pad utilized/changed  Pressure Reduction Devices: pressure-redistributing mattress utilized  Pressure Reduction Techniques: weight shift assistance provided  Skin Protection: adhesive use limited  B: Bowel Function   diarrhea   I: Indwelling Catheters   Mccord necessity:     CVC necessity: No  D: De-escalation Antibiotics   Yes    Family/Goals of care/Code Status   Code Status: DNR    24H Vital Sign Range  Temp:  [96.1 °F (35.6 °C)-98.2 °F (36.8 °C)]   Pulse:  [70-78]   Resp:  [10-21]   BP: (112-148)/(45-72)   SpO2:  [91 %-98 %]      Shift Events   No acute events throughout shift    VS and assessment per flow sheet, patient progressing towards goals as tolerated, plan of care reviewed with family and patient,  concerns addressed, will continue to monitor.    Deloris Lombardi

## 2023-03-24 NOTE — CONSULTS
[facility]  Wound Care    Patient Name:  Kamar Muñoz   MRN:  [id number]  Date: 3/24/2023  Diagnosis: Diabetic ketoacidosis without coma associated with type 2 diabetes mellitus    History:     Past Medical History:   Diagnosis Date    Anticoagulant long-term use     Arthritis     At high risk for falls     hx stroke-lt sided weakness    Colon polyp     Coronary artery disease     COVID-19 virus infection 2022    Depression     Diabetes mellitus type II     Embolic stroke involving left cerebellar artery 2022    Hyperlipidemia     Hypertension     Kidney stone     Migraine headache     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 2012    Paroxysmal atrial fibrillation     Pressure sore     STEMI involving right coronary artery 2022    Type II or unspecified type diabetes mellitus with neurological manifestations, uncontrolled(250.62) 2013    Urinary tract infection        Social History     Socioeconomic History    Marital status:    Tobacco Use    Smoking status: Former     Packs/day: 1.50     Years: 25.00     Pack years: 37.50     Types: Cigarettes     Quit date: 1983     Years since quittin.2    Smokeless tobacco: Never   Substance and Sexual Activity    Alcohol use: No    Drug use: No    Sexual activity: Yes     Partners: Female   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     Social Determinants of Health     Financial Resource Strain: Low Risk     Difficulty of Paying Living Expenses: Not hard at all   Transportation Needs: Unknown    Lack of Transportation (Medical): No    Lack of Transportation (Non-Medical): Patient refused   Physical Activity: Unknown    Days of Exercise per Week: Patient refused    Minutes of Exercise per Session: Patient refused   Social Connections: Unknown    Marital Status:    Housing Stability: High Risk    Unable to Pay for Housing in the Last Year: Yes    Number of  Places Lived in the Last Year: 1    Unstable Housing in the Last Year: No       Precautions:     Allergies as of 03/23/2023 - Reviewed 03/23/2023   Allergen Reaction Noted    Iodine  07/20/2012       [facility] Assessment Details/Treatment   Wound care consult received.   The sacrum has a 0.1xo.4xo.6cm ulcer on the lower right portion of the sacrum. The periwound is intact, pink and non-blanchable.The patient complains of tenderness to the scrotum, which is intact, pink, moist and blanchable. The sacrum, buttocks, and scrotum were cleansed and triad applied. The scalp has a grey skin graft with controlled bleeding. The dressings (xeroform, nonadherent and dry gauze) were removed and the wound was cleansed with vashe. A skin barrier was applied to the periwound, xeroform applied to the wound bed and covered with dry gauze. The left, upper arm is approximated with steri-strips in place. All wounds present on arrival.     Recommendations:  - Sacrum, buttocks and scrotum: nursing to cleanse with soap and and water, pat dry and apply triad BID and PRN; no dressings nor diapers  - Scalp and left upper arm: defer to the team that placed the graft  - Turning every 2 hours  - Heel protecting boots  - Waffle mattress overlay when transferred to the floor      03/24/23 0917        Incision/Site 03/24/23 0701 Left Arm anterior;upper   Date First Assessed/Time First Assessed: 03/24/23 0701   Present Prior to Hospital Arrival?: Yes  Side: Left  Location: Arm  Orientation: anterior;upper   Wound Image    Incision WDL WDL   Dressing Appearance Dry;Intact   Drainage Amount None   Appearance Steri-strips intact   Periwound Area Intact;Dry   Wound Edges Approximated   Dressing Changed;Gauze        Altered Skin Integrity 10/13/22 Sacral spine   Date First Assessed: 10/13/22   Altered Skin Integrity Present on Admission: yes  Location: Sacral spine   Wound Image    Description of Altered Skin Integrity Partial thickness tissue loss. Shallow  open ulcer with a red or pink wound bed, without slough. Intact or Open/Ruptured Serum-filled blister.   Dressing Appearance Open to air   Drainage Amount None   Appearance Pink;Red;Moist   Periwound Area Intact;Dry   Wound Length (cm) 0.1 cm   Wound Width (cm) 0.4 cm   Wound Depth (cm) 0.6 cm   Wound Volume (cm^3) 0.024 cm^3   Wound Surface Area (cm^2) 0.04 cm^2   Care Cleansed with:;Soap and water   Periwound Care Moisture barrier applied;Other (see comments)  (triad)        Altered Skin Integrity 03/24/23 0701 Scalp Other (comment)   Date First Assessed/Time First Assessed: 03/24/23 0701   Altered Skin Integrity Present on Admission - Did Patient arrive to the hospital with altered skin?: yes  Location: Scalp  Is this injury device related?: No  Primary Wound Type: (c) Other (comm...   Wound Image    Dressing Appearance Intact;Moist drainage;Dried drainage   Drainage Amount Moderate   Drainage Characteristics/Odor Bleeding controlled   Appearance Dry;Gray   Periwound Area Intact;Dry   Care Cleansed with:;Other (see comments)  (vashe)   Dressing Changed;Other (comment);Gauze  (xeroform)   Periwound Care Skin barrier film applied       Recommendations made to primary team for above plan via secure chat. Orders placed. Wound care to follow-up as needed.     03/24/2023

## 2023-03-24 NOTE — RESIDENT HANDOFF
"Critical Care Handoff     Primary Team: Networked reference to record PCT  Room Number: 6074/6074 A     Patient Name: Kamar Muñoz MRN: [id number]     Date of Birth: [id number] Allergies: Iodine     Age: 77 y.o. Admit Date: 3/23/2023     Sex: male  BMI: Body mass index is 21.17 kg/m².     Code Status: DNR        llness Level (current clinical status): Watcher - No    Reason for Admission: Diabetic ketoacidosis without coma associated with type 2 diabetes mellitus    Brief HPI (pertinent PMH and diagnosis or differential diagnosis): 78 y/o M with T2DM (last A1C 8.9) complicated by peripheral neuropathy, pAF (on apixaban), seizure disorder, HLD, previous CVA, and CAD s/p PCI to RCA (on clopidogrel) who presented from [facility] with waxing and waning mentation, weakness, nausea and vomiting, increased urinary frequency that started 4-5 days ago. He also has shortness of breath associated with shoulder pain from a recent clavicle fracture and a history of diarrhea (3 mo) that started after he started chronic antibiotic use for a sacral decubitus ulcer he developed from being bed-bound after debility from his MI and subsequent CVA last year. He recently fell out of his bed at the NH and sustained a clavicle fracture for which he doesn't have an adequate sling. Patient only able to answer yes/no questions, so collateral provided by his daughter, Basia, at the bedside. Basia says she's unsure if patient gets his insulin regimen at the NH. A nurse at the facility recently said his BG was "all over the place". A1C has improved from 10 to 8 in recent months. His LUE is bandaged from a skin graft site used to cover an excised skin cancer that was removed on Monday.     In the ED patient started on insulin ggt for , K 5.2, AG 35, BHB 5.3. CXR with concern for LLL PNA, UA not drawn due to anuria in setting of poor po intake, N/V, CT head non-contributory. Blood cultures drawn. Started on broad spectrum antibiotics. " Admitted to the MICU for DKA management.        Hospital Course (updated, brief assessment by system or problem, significant events): DKA resolved with closure of gap, BG<200, no longer acidotic. Tolerating PO. Wound care provided for sacral decub ulcer, scalp excision with skin graft.     Tasks (specific, using if-then statements):     - ID: follow Bcx and UA/Ur Cx to determine infectious inciting event for DKA  - Endo: monitor BG  - Renal: monitor BRENDAN  - Derm: wound care for skin graft implantation (scalp), skin graft harvest site (LUE), sacral decub ulcer    Estimated Discharge Date: 3/28/2023    Discharge Disposition: Nursing Facility

## 2023-03-24 NOTE — ASSESSMENT & PLAN NOTE
Decubitus sacral ulcer 3 mo per daughter from being bed-bound.    - Wound care consulted with appreciated recs for care  - Reaching out to dermatology for assistance with LUE graft care, uncertain of provider who completed procedure (not in chart review)

## 2023-03-24 NOTE — HPI
"Kamar Muñoz is a 77 year old white man with hypertension, diabetes mellitus type 2 (treated with insulin) with peripheral neuropathy, coronary artery disease with history of ST elevation myocardial infarction status post percutaneous coronary intervention with stent placements in mid right coronary artery on 1/9/2022, paroxysmal atrial fibrillation, history of embolic stroke in right frontal lobe and left cerebellar hemisphere on 1/12/2022, history of partial seizures, sacral decubitus ulcer, history of L1 lumbar compression fracture status post percutaneous vertebral body augmentation on 2/1/2023, history of right clavicle fracture on 2/27/2023. He lives at [facility] in [place], [place]. His primary care physician is Dr. Basim Guerrero. He is DNR.   He presented to [facility] on 3/23/2023 with waxing and waning mentation, weakness, nausea, vomiting, and increased urinary frequency that started 4 to 5 days ago. He also had shortness of breath associated with his recent clavicle fracture and a history of diarrhea for 3 months that started after starting a chronic antibiotic to treat a sacral decubitus ulcer he developed from being bed bound due debility from his myocardial infarction and subsequent stroke. He did not have an adequate sling for his clavicle fracture. He was only able to answer yes/no questions, so collateral information was provided by his daughter Basia on admission. Basia said she was unsure if he was getting his prescribed insulin regimen at the nursing home. A nurse at the nursing home recently said his blood glucose was "all over the place", although his hemoglobin A1c had improved from 10.6% on 10/5/2022 to 8.9% on 2/28/2023. His left upper extremity was bandaged from a skin graft site used to cover an excised skin cancer removed on 3/20/2023. His blood glucose was 795 mg/dL in the emergency department, with an anion " gap of 35 and beta-hydroxybutyrate of 5.3 mmol/L, lactate of 6.2 mmol/L, BUN and creatinine of 28 mg/dL and 2.3 mg/dL (from 18 and 1.0 on 3/1/2023), and potassium of 5.2 mmol/L. Chest X-ray suggested left lower lobe pneumonia. He had no urine to obtain urinalysis. He was put on broad spectrum antibiotics. He was admitted to Critical Care Medicine.

## 2023-03-24 NOTE — ASSESSMENT & PLAN NOTE
Hold home Toprol, Cardizem, and amiodarone given hypotension. Not on AC. [id number] Score: 4 HASBLED 4.    - Resume amiodarone

## 2023-03-24 NOTE — CARE UPDATE
Tried to call pt's daughters (Marisa and Basia) to give clinical update and to ask what provider removed scalp malignancy/placed skin graft. No answer, will attempt again tomorrow.

## 2023-03-24 NOTE — CONSULTS
Therapy with vancomycin discontinued by provider. Pharmacy will sign off, please re-consult as needed.    Natty Wellington, PharmD, BCCCP  [id number]

## 2023-03-25 LAB
ALBUMIN SERPL BCP-MCNC: 2.5 G/DL (ref 3.5–5.2)
ALP SERPL-CCNC: 152 U/L (ref 55–135)
ALT SERPL W/O P-5'-P-CCNC: 27 U/L (ref 10–44)
ANION GAP SERPL CALC-SCNC: 12 MMOL/L (ref 8–16)
AST SERPL-CCNC: 51 U/L (ref 10–40)
BACTERIA #/AREA URNS AUTO: ABNORMAL /HPF
BASOPHILS # BLD AUTO: 0.02 K/UL (ref 0–0.2)
BASOPHILS NFR BLD: 0.3 % (ref 0–1.9)
BILIRUB SERPL-MCNC: 0.3 MG/DL (ref 0.1–1)
BILIRUB UR QL STRIP: NEGATIVE
BUN SERPL-MCNC: 19 MG/DL (ref 8–23)
CALCIUM SERPL-MCNC: 8.8 MG/DL (ref 8.7–10.5)
CHLORIDE SERPL-SCNC: 112 MMOL/L (ref 95–110)
CLARITY UR REFRACT.AUTO: ABNORMAL
CO2 SERPL-SCNC: 24 MMOL/L (ref 23–29)
COLOR UR AUTO: YELLOW
CREAT SERPL-MCNC: 1.2 MG/DL (ref 0.5–1.4)
DIFFERENTIAL METHOD: ABNORMAL
EOSINOPHIL # BLD AUTO: 0.1 K/UL (ref 0–0.5)
EOSINOPHIL NFR BLD: 0.8 % (ref 0–8)
ERYTHROCYTE [DISTWIDTH] IN BLOOD BY AUTOMATED COUNT: 14.9 % (ref 11.5–14.5)
EST. GFR  (NO RACE VARIABLE): >60 ML/MIN/1.73 M^2
GLUCOSE SERPL-MCNC: 112 MG/DL (ref 70–110)
GLUCOSE UR QL STRIP: NEGATIVE
HCT VFR BLD AUTO: 31.3 % (ref 40–54)
HGB BLD-MCNC: 9.8 G/DL (ref 14–18)
HGB UR QL STRIP: ABNORMAL
HYALINE CASTS UR QL AUTO: 8 /LPF
IMM GRANULOCYTES # BLD AUTO: 0.01 K/UL (ref 0–0.04)
IMM GRANULOCYTES NFR BLD AUTO: 0.1 % (ref 0–0.5)
KETONES UR QL STRIP: ABNORMAL
LEUKOCYTE ESTERASE UR QL STRIP: ABNORMAL
LYMPHOCYTES # BLD AUTO: 1.1 K/UL (ref 1–4.8)
LYMPHOCYTES NFR BLD: 15.6 % (ref 18–48)
MAGNESIUM SERPL-MCNC: 1.9 MG/DL (ref 1.6–2.6)
MCH RBC QN AUTO: 28.4 PG (ref 27–31)
MCHC RBC AUTO-ENTMCNC: 31.3 G/DL (ref 32–36)
MCV RBC AUTO: 91 FL (ref 82–98)
MICROSCOPIC COMMENT: ABNORMAL
MONOCYTES # BLD AUTO: 0.5 K/UL (ref 0.3–1)
MONOCYTES NFR BLD: 6.4 % (ref 4–15)
NEUTROPHILS # BLD AUTO: 5.6 K/UL (ref 1.8–7.7)
NEUTROPHILS NFR BLD: 76.8 % (ref 38–73)
NITRITE UR QL STRIP: NEGATIVE
NRBC BLD-RTO: 0 /100 WBC
PH UR STRIP: 6 [PH] (ref 5–8)
PHOSPHATE SERPL-MCNC: 2.8 MG/DL (ref 2.7–4.5)
PLATELET # BLD AUTO: 205 K/UL (ref 150–450)
PMV BLD AUTO: 9.8 FL (ref 9.2–12.9)
POCT GLUCOSE: 120 MG/DL (ref 70–110)
POCT GLUCOSE: 126 MG/DL (ref 70–110)
POCT GLUCOSE: 151 MG/DL (ref 70–110)
POCT GLUCOSE: 76 MG/DL (ref 70–110)
POTASSIUM SERPL-SCNC: 3.5 MMOL/L (ref 3.5–5.1)
PROT SERPL-MCNC: 5.5 G/DL (ref 6–8.4)
PROT UR QL STRIP: ABNORMAL
RBC # BLD AUTO: 3.45 M/UL (ref 4.6–6.2)
RBC #/AREA URNS AUTO: >100 /HPF (ref 0–4)
SODIUM SERPL-SCNC: 148 MMOL/L (ref 136–145)
SP GR UR STRIP: 1.02 (ref 1–1.03)
SQUAMOUS #/AREA URNS AUTO: 1 /HPF
URN SPEC COLLECT METH UR: ABNORMAL
WBC # BLD AUTO: 7.24 K/UL (ref 3.9–12.7)
WBC #/AREA URNS AUTO: >100 /HPF (ref 0–5)
WBC CLUMPS UR QL AUTO: ABNORMAL

## 2023-03-25 PROCEDURE — 25000003 PHARM REV CODE 250

## 2023-03-25 PROCEDURE — 81001 URINALYSIS AUTO W/SCOPE: CPT

## 2023-03-25 PROCEDURE — 99233 PR SUBSEQUENT HOSPITAL CARE,LEVL III: ICD-10-PCS | Mod: GC,,, | Performed by: INTERNAL MEDICINE

## 2023-03-25 PROCEDURE — 80053 COMPREHEN METABOLIC PANEL: CPT | Performed by: INTERNAL MEDICINE

## 2023-03-25 PROCEDURE — 83735 ASSAY OF MAGNESIUM: CPT

## 2023-03-25 PROCEDURE — 87086 URINE CULTURE/COLONY COUNT: CPT

## 2023-03-25 PROCEDURE — 85025 COMPLETE CBC W/AUTO DIFF WBC: CPT

## 2023-03-25 PROCEDURE — 63600175 PHARM REV CODE 636 W HCPCS: Performed by: INTERNAL MEDICINE

## 2023-03-25 PROCEDURE — 63600175 PHARM REV CODE 636 W HCPCS

## 2023-03-25 PROCEDURE — 84100 ASSAY OF PHOSPHORUS: CPT

## 2023-03-25 PROCEDURE — 94761 N-INVAS EAR/PLS OXIMETRY MLT: CPT

## 2023-03-25 PROCEDURE — 11000001 HC ACUTE MED/SURG PRIVATE ROOM

## 2023-03-25 PROCEDURE — 99233 SBSQ HOSP IP/OBS HIGH 50: CPT | Mod: GC,,, | Performed by: INTERNAL MEDICINE

## 2023-03-25 RX ORDER — LOSARTAN POTASSIUM 25 MG/1
25 TABLET ORAL DAILY
Status: DISCONTINUED | OUTPATIENT
Start: 2023-03-25 | End: 2023-03-28 | Stop reason: HOSPADM

## 2023-03-25 RX ORDER — METOPROLOL SUCCINATE 25 MG/1
25 TABLET, EXTENDED RELEASE ORAL DAILY
Status: DISCONTINUED | OUTPATIENT
Start: 2023-03-25 | End: 2023-03-28 | Stop reason: HOSPADM

## 2023-03-25 RX ORDER — POTASSIUM CHLORIDE 750 MG/1
30 CAPSULE, EXTENDED RELEASE ORAL ONCE
Status: COMPLETED | OUTPATIENT
Start: 2023-03-25 | End: 2023-03-25

## 2023-03-25 RX ORDER — HYDRALAZINE HYDROCHLORIDE 25 MG/1
25 TABLET, FILM COATED ORAL EVERY 8 HOURS PRN
Status: DISCONTINUED | OUTPATIENT
Start: 2023-03-25 | End: 2023-03-28 | Stop reason: HOSPADM

## 2023-03-25 RX ADMIN — METOPROLOL SUCCINATE 25 MG: 25 TABLET, EXTENDED RELEASE ORAL at 08:03

## 2023-03-25 RX ADMIN — HEPARIN SODIUM 5000 UNITS: 5000 INJECTION INTRAVENOUS; SUBCUTANEOUS at 09:03

## 2023-03-25 RX ADMIN — PANTOPRAZOLE SODIUM 40 MG: 40 TABLET, DELAYED RELEASE ORAL at 08:03

## 2023-03-25 RX ADMIN — INSULIN DETEMIR 6 UNITS: 100 INJECTION, SOLUTION SUBCUTANEOUS at 08:03

## 2023-03-25 RX ADMIN — INSULIN ASPART 5 UNITS: 100 INJECTION, SOLUTION INTRAVENOUS; SUBCUTANEOUS at 08:03

## 2023-03-25 RX ADMIN — LOSARTAN POTASSIUM 25 MG: 25 TABLET, FILM COATED ORAL at 08:03

## 2023-03-25 RX ADMIN — AMIODARONE HYDROCHLORIDE 200 MG: 200 TABLET ORAL at 08:03

## 2023-03-25 RX ADMIN — POTASSIUM CHLORIDE 30 MEQ: 10 CAPSULE, COATED, EXTENDED RELEASE ORAL at 04:03

## 2023-03-25 RX ADMIN — CEFTRIAXONE 1 G: 1 INJECTION, POWDER, FOR SOLUTION INTRAMUSCULAR; INTRAVENOUS at 05:03

## 2023-03-25 RX ADMIN — LACOSAMIDE 100 MG: 50 TABLET, FILM COATED ORAL at 08:03

## 2023-03-25 RX ADMIN — HEPARIN SODIUM 5000 UNITS: 5000 INJECTION INTRAVENOUS; SUBCUTANEOUS at 02:03

## 2023-03-25 RX ADMIN — HYDRALAZINE HYDROCHLORIDE 25 MG: 25 TABLET, FILM COATED ORAL at 04:03

## 2023-03-25 RX ADMIN — HEPARIN SODIUM 5000 UNITS: 5000 INJECTION INTRAVENOUS; SUBCUTANEOUS at 06:03

## 2023-03-25 RX ADMIN — INSULIN ASPART 5 UNITS: 100 INJECTION, SOLUTION INTRAVENOUS; SUBCUTANEOUS at 12:03

## 2023-03-25 RX ADMIN — TAMSULOSIN HYDROCHLORIDE 0.4 MG: 0.4 CAPSULE ORAL at 08:03

## 2023-03-25 RX ADMIN — ATORVASTATIN CALCIUM 40 MG: 40 TABLET, FILM COATED ORAL at 08:03

## 2023-03-25 RX ADMIN — LACOSAMIDE 100 MG: 50 TABLET, FILM COATED ORAL at 09:03

## 2023-03-25 NOTE — PROGRESS NOTES
"[facility]  Critical Care Medicine  Progress Note    Patient Name: Kamar Muñoz  MRN: [id number]  Admission Date: 3/23/2023  Hospital Length of Stay: 2 days  Code Status: DNR  Attending Provider: Giovanni Reinoso MD  Primary Care Provider: Basim Guerrero MD   Principal Problem: Diabetic ketoacidosis without coma associated with type 2 diabetes mellitus    Subjective:     HPI:  76 y/o M with T2DM (last A1C 8.9) complicated by peripheral neuropathy, pAF (on apixaban), seizure disorder, HLD, previous CVA, and CAD s/p PCI to RCA (on clopidogrel) who presented from [facility] with waxing and waning mentation, weakness, nausea and vomiting, increased urinary frequency that started 4-5 days ago. He also has shortness of breath associated with shoulder pain from a recent clavicle fracture and a history of diarrhea (3 mo) that started after he started chronic antibiotic use for a sacral decubitus ulcer he developed from being bed-bound after debility from his MI and subsequent CVA last year. He recently fell out of his bed at the NH and sustained a clavicle fracture for which he doesn't have an adequate sling. Patient only able to answer yes/no questions, so collateral provided by his daughter, Basia, at the bedside. Basia says she's unsure if patient gets his insulin regimen at the NH. A nurse at the facility recently said his BG was "all over the place". A1C has improved from 10 to 8 in recent months. His LUE is bandaged from a skin graft site used to cover an excised skin cancer that was removed on Monday.     In the ED patient started on insulin ggt for , K 5.2, AG 35, BHB 5.3. CXR with concern for LLL PNA, UA not drawn due to anuria in setting of poor po intake, N/V, CT head non-contributory. Blood cultures drawn. Started on broad spectrum antibiotics. Admitted to the MICU for DKA management.       Hospital/ICU Course:  DKA resolved with closure of gap, BG<200, no longer " acidotic. Tolerating PO. Wound care provided for sacral decub ulcer, scalp excision with skin graft.      No new subjective & objective note has been filed under this hospital service since the last note was generated.      ABG  Recent Labs   Lab 03/23/23  0727   PH 7.093*   PO2 45   PCO2 26.5*   HCO3 8.1*   BE -22     Assessment/Plan:     Neuro  History of partial seizures  - resume home Vimpat BID    Encephalopathy, metabolic  In the setting of sepsis/DKA. Continue to monitor, replete electrolytes. Some concern for dementia, pseudodementia/depression 2/2 his wife of 60 years passing away last year, abruptly quitting working after MI/CVA, and transition to nursing facility.    - Delirium precautions  - Fall precautions    History of embolic stroke  Resume home lipitor    Cardiac/Vascular  Paroxysmal atrial fibrillation   Hold home Toprol, Cardizem, and amiodarone given hypotension. Not on AC. [id number] Score: 4 HASBLED 4.    - Resume amiodarone            Coronary artery disease involving native coronary artery of native heart with unstable angina pectoris  Resume home Lipitor    Essential hypertension  Restarted on home losartan amiodarone, metoprolol    Endocrine  Type 2 diabetes mellitus with hyperglycemia, with long-term current use of insulin  - Now on home regimen 6u BID detemir and 5u TIDWM aspart.    Orthopedic  Pressure injury of buttock, unstageable  See altered skin integrity    Alteration in skin integrity  Decubitus sacral ulcer 3 mo per daughter from being bed-bound.    - Wound care consulted with appreciated recs for care  - Reaching out to dermatology for assistance with LUE graft care, uncertain of provider who completed procedure (not in chart review)       Critical Care Daily Checklist:    A: Awake: RASS Goal/Actual Goal: RASS Goal: 0-->alert and calm  Actual: Long Agitation Sedation Scale (RASS): Alert and calm   B: Spontaneous Breathing Trial Performed?     C: SAT & SBT Coordinated?  Not  applicable                      D: Delirium: CAM-ICU Overall CAM-ICU: Positive   E: Early Mobility Performed? No   F: Feeding Goal:    Status:     Current Diet Order   Procedures    Diet diabetic [facility]; 2000 Calorie     Order Specific Question:   Indicate patient location for additional diet options:     Answer:   [facility]     Order Specific Question:   Total calories:     Answer:   2000 Calorie      AS: Analgesia/Sedation Tylenol   T: Thromboembolic Prophylaxis Heparin   H: HOB > 300 Yes   U: Stress Ulcer Prophylaxis (if needed) Not applicable   G: Glucose Control At goal   B: Bowel Function Stool Occurrence: 1   I: Indwelling Catheter (Lines & Mccord) Necessity PIV   D: De-escalation of Antimicrobials/Pharmacotherapies Continue CTX treatment for UTI    Plan for the day/ETD Stepdown    Code Status:  Family/Goals of Care: DNR  Calling family       Critical secondary to Patient has an abrupt change in neurologic status: DKA      Critical care was time spent personally by me on the following activities: development of treatment plan with patient or surrogate and bedside caregivers, discussions with consultants, evaluation of patient's response to treatment, examination of patient, ordering and performing treatments and interventions, ordering and review of laboratory studies, ordering and review of radiographic studies, pulse oximetry, re-evaluation of patient's condition. This critical care time did not overlap with that of any other provider or involve time for any procedures.     Gatito Cesar MD  Critical Care Medicine  [facility] - Cardiac Medical ICU

## 2023-03-25 NOTE — ASSESSMENT & PLAN NOTE
Hold home Toprol, Cardizem, and amiodarone given hypotension. Not on AC. [id number] Score: 4 HASBLED 4.    - Resume amiodarone

## 2023-03-26 LAB
ALBUMIN SERPL BCP-MCNC: 2.5 G/DL (ref 3.5–5.2)
ALP SERPL-CCNC: 165 U/L (ref 55–135)
ALT SERPL W/O P-5'-P-CCNC: 27 U/L (ref 10–44)
ANION GAP SERPL CALC-SCNC: 11 MMOL/L (ref 8–16)
AST SERPL-CCNC: 55 U/L (ref 10–40)
BACTERIA UR CULT: NORMAL
BASOPHILS # BLD AUTO: 0.03 K/UL (ref 0–0.2)
BASOPHILS NFR BLD: 0.6 % (ref 0–1.9)
BILIRUB SERPL-MCNC: 0.4 MG/DL (ref 0.1–1)
BUN SERPL-MCNC: 15 MG/DL (ref 8–23)
CALCIUM SERPL-MCNC: 8.4 MG/DL (ref 8.7–10.5)
CHLORIDE SERPL-SCNC: 111 MMOL/L (ref 95–110)
CO2 SERPL-SCNC: 23 MMOL/L (ref 23–29)
CREAT SERPL-MCNC: 0.9 MG/DL (ref 0.5–1.4)
DIFFERENTIAL METHOD: ABNORMAL
EOSINOPHIL # BLD AUTO: 0.1 K/UL (ref 0–0.5)
EOSINOPHIL NFR BLD: 1.2 % (ref 0–8)
ERYTHROCYTE [DISTWIDTH] IN BLOOD BY AUTOMATED COUNT: 14.9 % (ref 11.5–14.5)
EST. GFR  (NO RACE VARIABLE): >60 ML/MIN/1.73 M^2
GLUCOSE SERPL-MCNC: 172 MG/DL (ref 70–110)
HCT VFR BLD AUTO: 35.5 % (ref 40–54)
HGB BLD-MCNC: 10.9 G/DL (ref 14–18)
IMM GRANULOCYTES # BLD AUTO: 0.01 K/UL (ref 0–0.04)
IMM GRANULOCYTES NFR BLD AUTO: 0.2 % (ref 0–0.5)
LYMPHOCYTES # BLD AUTO: 1.2 K/UL (ref 1–4.8)
LYMPHOCYTES NFR BLD: 24.4 % (ref 18–48)
MAGNESIUM SERPL-MCNC: 1.8 MG/DL (ref 1.6–2.6)
MCH RBC QN AUTO: 28 PG (ref 27–31)
MCHC RBC AUTO-ENTMCNC: 30.7 G/DL (ref 32–36)
MCV RBC AUTO: 91 FL (ref 82–98)
MONOCYTES # BLD AUTO: 0.4 K/UL (ref 0.3–1)
MONOCYTES NFR BLD: 7.5 % (ref 4–15)
NEUTROPHILS # BLD AUTO: 3.3 K/UL (ref 1.8–7.7)
NEUTROPHILS NFR BLD: 66.1 % (ref 38–73)
NRBC BLD-RTO: 0 /100 WBC
PHOSPHATE SERPL-MCNC: 3.2 MG/DL (ref 2.7–4.5)
PLATELET # BLD AUTO: 195 K/UL (ref 150–450)
PMV BLD AUTO: 9.6 FL (ref 9.2–12.9)
POCT GLUCOSE: 141 MG/DL (ref 70–110)
POCT GLUCOSE: 237 MG/DL (ref 70–110)
POCT GLUCOSE: 298 MG/DL (ref 70–110)
POCT GLUCOSE: 78 MG/DL (ref 70–110)
POTASSIUM SERPL-SCNC: 4.1 MMOL/L (ref 3.5–5.1)
PROT SERPL-MCNC: 5.9 G/DL (ref 6–8.4)
RBC # BLD AUTO: 3.89 M/UL (ref 4.6–6.2)
SODIUM SERPL-SCNC: 145 MMOL/L (ref 136–145)
WBC # BLD AUTO: 4.96 K/UL (ref 3.9–12.7)

## 2023-03-26 PROCEDURE — 25000003 PHARM REV CODE 250

## 2023-03-26 PROCEDURE — 63600175 PHARM REV CODE 636 W HCPCS

## 2023-03-26 PROCEDURE — 80053 COMPREHEN METABOLIC PANEL: CPT | Performed by: INTERNAL MEDICINE

## 2023-03-26 PROCEDURE — 94761 N-INVAS EAR/PLS OXIMETRY MLT: CPT

## 2023-03-26 PROCEDURE — 85025 COMPLETE CBC W/AUTO DIFF WBC: CPT

## 2023-03-26 PROCEDURE — 20600001 HC STEP DOWN PRIVATE ROOM

## 2023-03-26 PROCEDURE — 25000003 PHARM REV CODE 250: Performed by: INTERNAL MEDICINE

## 2023-03-26 PROCEDURE — 84100 ASSAY OF PHOSPHORUS: CPT | Performed by: INTERNAL MEDICINE

## 2023-03-26 PROCEDURE — 83735 ASSAY OF MAGNESIUM: CPT

## 2023-03-26 PROCEDURE — 99233 PR SUBSEQUENT HOSPITAL CARE,LEVL III: ICD-10-PCS | Mod: GC,,, | Performed by: INTERNAL MEDICINE

## 2023-03-26 PROCEDURE — 63600175 PHARM REV CODE 636 W HCPCS: Performed by: INTERNAL MEDICINE

## 2023-03-26 PROCEDURE — 99233 SBSQ HOSP IP/OBS HIGH 50: CPT | Mod: GC,,, | Performed by: INTERNAL MEDICINE

## 2023-03-26 RX ORDER — MAGNESIUM SULFATE HEPTAHYDRATE 40 MG/ML
2 INJECTION, SOLUTION INTRAVENOUS ONCE
Status: COMPLETED | OUTPATIENT
Start: 2023-03-26 | End: 2023-03-26

## 2023-03-26 RX ORDER — LOPERAMIDE HYDROCHLORIDE 2 MG/1
2 CAPSULE ORAL ONCE
Status: COMPLETED | OUTPATIENT
Start: 2023-03-26 | End: 2023-03-26

## 2023-03-26 RX ORDER — SULFAMETHOXAZOLE AND TRIMETHOPRIM 800; 160 MG/1; MG/1
1 TABLET ORAL 2 TIMES DAILY
Qty: 2 TABLET | Refills: 0 | Status: SHIPPED | OUTPATIENT
Start: 2023-03-26 | End: 2023-03-27 | Stop reason: SDUPTHER

## 2023-03-26 RX ORDER — DILTIAZEM HYDROCHLORIDE 30 MG/1
30 TABLET, FILM COATED ORAL EVERY 6 HOURS
Status: DISCONTINUED | OUTPATIENT
Start: 2023-03-26 | End: 2023-03-28 | Stop reason: HOSPADM

## 2023-03-26 RX ORDER — SULFAMETHOXAZOLE AND TRIMETHOPRIM 800; 160 MG/1; MG/1
1 TABLET ORAL 2 TIMES DAILY
Status: DISCONTINUED | OUTPATIENT
Start: 2023-03-26 | End: 2023-03-28 | Stop reason: HOSPADM

## 2023-03-26 RX ORDER — LIDOCAINE 50 MG/G
1 PATCH TOPICAL
Status: DISCONTINUED | OUTPATIENT
Start: 2023-03-26 | End: 2023-03-28 | Stop reason: HOSPADM

## 2023-03-26 RX ORDER — INSULIN ASPART 100 [IU]/ML
8 INJECTION, SOLUTION INTRAVENOUS; SUBCUTANEOUS
Qty: 86.4 ML | Refills: 0 | Status: SHIPPED | OUTPATIENT
Start: 2023-03-26 | End: 2024-02-29 | Stop reason: CLARIF

## 2023-03-26 RX ADMIN — ACETAMINOPHEN 650 MG: 325 TABLET ORAL at 11:03

## 2023-03-26 RX ADMIN — HEPARIN SODIUM 5000 UNITS: 5000 INJECTION INTRAVENOUS; SUBCUTANEOUS at 05:03

## 2023-03-26 RX ADMIN — SULFAMETHOXAZOLE AND TRIMETHOPRIM 1 TABLET: 800; 160 TABLET ORAL at 07:03

## 2023-03-26 RX ADMIN — MAGNESIUM SULFATE 2 G: 2 INJECTION INTRAVENOUS at 05:03

## 2023-03-26 RX ADMIN — ATORVASTATIN CALCIUM 40 MG: 40 TABLET, FILM COATED ORAL at 09:03

## 2023-03-26 RX ADMIN — METOPROLOL SUCCINATE 25 MG: 25 TABLET, EXTENDED RELEASE ORAL at 09:03

## 2023-03-26 RX ADMIN — DILTIAZEM HYDROCHLORIDE 30 MG: 30 TABLET, FILM COATED ORAL at 09:03

## 2023-03-26 RX ADMIN — ACETAMINOPHEN 650 MG: 325 TABLET ORAL at 05:03

## 2023-03-26 RX ADMIN — LACOSAMIDE 100 MG: 50 TABLET, FILM COATED ORAL at 07:03

## 2023-03-26 RX ADMIN — INSULIN ASPART 5 UNITS: 100 INJECTION, SOLUTION INTRAVENOUS; SUBCUTANEOUS at 04:03

## 2023-03-26 RX ADMIN — LIDOCAINE 1 PATCH: 50 PATCH CUTANEOUS at 11:03

## 2023-03-26 RX ADMIN — LOSARTAN POTASSIUM 25 MG: 25 TABLET, FILM COATED ORAL at 09:03

## 2023-03-26 RX ADMIN — INSULIN DETEMIR 6 UNITS: 100 INJECTION, SOLUTION SUBCUTANEOUS at 09:03

## 2023-03-26 RX ADMIN — DILTIAZEM HYDROCHLORIDE 30 MG: 30 TABLET, FILM COATED ORAL at 11:03

## 2023-03-26 RX ADMIN — LOPERAMIDE HYDROCHLORIDE 2 MG: 2 CAPSULE ORAL at 10:03

## 2023-03-26 RX ADMIN — INSULIN ASPART 5 UNITS: 100 INJECTION, SOLUTION INTRAVENOUS; SUBCUTANEOUS at 11:03

## 2023-03-26 RX ADMIN — ONDANSETRON 4 MG: 2 INJECTION INTRAMUSCULAR; INTRAVENOUS at 11:03

## 2023-03-26 RX ADMIN — INSULIN ASPART 5 UNITS: 100 INJECTION, SOLUTION INTRAVENOUS; SUBCUTANEOUS at 07:03

## 2023-03-26 RX ADMIN — PANTOPRAZOLE SODIUM 40 MG: 40 TABLET, DELAYED RELEASE ORAL at 09:03

## 2023-03-26 RX ADMIN — AMIODARONE HYDROCHLORIDE 200 MG: 200 TABLET ORAL at 09:03

## 2023-03-26 RX ADMIN — APIXABAN 5 MG: 5 TABLET, FILM COATED ORAL at 07:03

## 2023-03-26 RX ADMIN — TAMSULOSIN HYDROCHLORIDE 0.4 MG: 0.4 CAPSULE ORAL at 09:03

## 2023-03-26 RX ADMIN — DILTIAZEM HYDROCHLORIDE 30 MG: 30 TABLET, FILM COATED ORAL at 06:03

## 2023-03-26 RX ADMIN — LACOSAMIDE 100 MG: 50 TABLET, FILM COATED ORAL at 09:03

## 2023-03-26 NOTE — PROGRESS NOTES
"[facility]  Critical Care Medicine  Progress Note    Patient Name: Kamar Muñoz  MRN: [id number]  Admission Date: 3/23/2023  Hospital Length of Stay: 3 days  Code Status: DNR  Attending Provider: Giovanni Reinoso MD  Primary Care Provider: Basim Guerrero MD   Principal Problem: Diabetic ketoacidosis without coma associated with type 2 diabetes mellitus    Subjective:     HPI:  76 y/o M with T2DM (last A1C 8.9) complicated by peripheral neuropathy, pAF (on apixaban), seizure disorder, HLD, previous CVA, and CAD s/p PCI to RCA (on clopidogrel) who presented from [facility] with waxing and waning mentation, weakness, nausea and vomiting, increased urinary frequency that started 4-5 days ago. He also has shortness of breath associated with shoulder pain from a recent clavicle fracture and a history of diarrhea (3 mo) that started after he started chronic antibiotic use for a sacral decubitus ulcer he developed from being bed-bound after debility from his MI and subsequent CVA last year. He recently fell out of his bed at the NH and sustained a clavicle fracture for which he doesn't have an adequate sling. Patient only able to answer yes/no questions, so collateral provided by his daughter, Basia, at the bedside. Basia says she's unsure if patient gets his insulin regimen at the NH. A nurse at the facility recently said his BG was "all over the place". A1C has improved from 10 to 8 in recent months. His LUE is bandaged from a skin graft site used to cover an excised skin cancer that was removed on Monday.     In the ED patient started on insulin ggt for , K 5.2, AG 35, BHB 5.3. CXR with concern for LLL PNA, UA not drawn due to anuria in setting of poor po intake, N/V, CT head non-contributory. Blood cultures drawn. Started on broad spectrum antibiotics. Admitted to the MICU for DKA management.       Hospital/ICU Course:  DKA resolved with closure of gap, BG<200, no longer " acidotic. Tolerating PO. Wound care provided for sacral decub ulcer, scalp excision with skin graft. Mentation improving over course of admission. Insulin regimen adjusted based on hospital needs, discharged to NH with empiric coverage for UTI given no history of MDRO.       Interval History/Significant Events: Patient's mentation back at baseline. Doing well, hyperglycemia being managed by refining insulin regimen.     Review of Systems   Constitutional:  Negative for activity change, appetite change, fatigue and fever.   Respiratory:  Negative for cough, shortness of breath and wheezing.    Cardiovascular:  Negative for chest pain and leg swelling.   Gastrointestinal:  Negative for abdominal pain, constipation, diarrhea, nausea and vomiting.   Skin:  Positive for wound (Scalp wound from surgery, bandage C/D/I, LUE skin graft site bandaged, C/D/I). Negative for rash.   Neurological:  Negative for headaches.   Psychiatric/Behavioral:  Positive for decreased concentration.    Objective:     Vital Signs (Most Recent):  Temp: 97.8 °F (36.6 °C) (03/26/23 0301)  Pulse: 62 (03/26/23 0753)  Resp: 13 (03/26/23 0753)  BP: (!) 167/75 (03/26/23 0601)  SpO2: 98 % (03/26/23 0753)   Vital Signs (24h Range):  Temp:  [97.6 °F (36.4 °C)-98.1 °F (36.7 °C)] 97.8 °F (36.6 °C)  Pulse:  [55-73] 62  Resp:  [10-24] 13  SpO2:  [94 %-99 %] 98 %  BP: (118-182)/(58-92) 167/75   Weight: 74.8 kg (164 lb 14.5 oz)  Body mass index is 21.17 kg/m².      Intake/Output Summary (Last 24 hours) at 3/26/2023 0835  Last data filed at 3/26/2023 0601  Gross per 24 hour   Intake 194.54 ml   Output 615 ml   Net -420.46 ml       Physical Exam    Vents:     Lines/Drains/Airways       Drain  Duration             Male External Urinary Catheter 03/23/23 0758 Medium 3 days              Peripheral Intravenous Line  Duration                  Peripheral IV - Single Lumen 03/23/23 0804 20 G Right Antecubital 3 days         Peripheral IV - Single Lumen 03/23/23 0805 20  G Right Forearm 3 days                  Significant Labs:    CBC/Anemia Profile:  Recent Labs   Lab 03/25/23  0236 03/26/23  0248   WBC 7.24 4.96   HGB 9.8* 10.9*   HCT 31.3* 35.5*    195   MCV 91 91   RDW 14.9* 14.9*        Chemistries:  Recent Labs   Lab 03/25/23  0236 03/26/23  0248   * 145   K 3.5 4.1   * 111*   CO2 24 23   BUN 19 15   CREATININE 1.2 0.9   CALCIUM 8.8 8.4*   ALBUMIN 2.5* 2.5*   PROT 5.5* 5.9*   BILITOT 0.3 0.4   ALKPHOS 152* 165*   ALT 27 27   AST 51* 55*   MG 1.9 1.8   PHOS 2.8 3.2       All pertinent labs within the past 24 hours have been reviewed.    Significant Imaging:  I have reviewed all pertinent imaging results/findings within the past 24 hours.      ABG  Recent Labs   Lab 03/23/23  0727   PH 7.093*   PO2 45   PCO2 26.5*   HCO3 8.1*   BE -22     Assessment/Plan:     Neuro  History of partial seizures  -Discussed with NH and patient no longer takes Vimpat BID    Encephalopathy, metabolic  In the setting of sepsis/DKA. Continue to monitor, replete electrolytes. Some concern for dementia, pseudodementia/depression 2/2 his wife of 60 years passing away last year, abruptly quitting working after MI/CVA, and transition to nursing facility.    - Improved to baseline  - Delirium precautions  - Fall precautions    History of embolic stroke  Resume home lipitor    Cardiac/Vascular  Paroxysmal atrial fibrillation   Hold home Toprol, Cardizem, and amiodarone given hypotension. Not on AC. [id number] Score: 4 HASBLED 4.    - Resume amiodarone            Coronary artery disease involving native coronary artery of native heart with unstable angina pectoris  Resume home Lipitor    Essential hypertension  Restarted on home losartan, amiodarone, metoprolol.    - Add Cardizem, previously DC'd on last admission, for tighter HTN control    Endocrine  Type 2 diabetes mellitus with hyperglycemia, with long-term current use of insulin  - Now on home regimen 6u BID detemir and 5u TIDWM  aspart. Clarified with NH that regimen is actually detemir 14u daily and aspart 8u TID with additional SSI aspart prn. Updated in DC med rec.    -Change to 7u BID detemir and continue 5u TIDWM aspart and continue to refine per SSI needs    Orthopedic  Pressure injury of buttock, unstageable  See altered skin integrity    Alteration in skin integrity  Decubitus sacral ulcer 3 mo per daughter from being bed-bound.    - Wound care consulted with appreciated recs for care  - Had graft done at [facility] on [address]    Other  Nursing home resident  Discussed current meds with NH.       Critical Care Daily Checklist:    A: Awake: RASS Goal/Actual Goal: RASS Goal: 0-->alert and calm  Actual: Long Agitation Sedation Scale (RASS): Drowsy   B: Spontaneous Breathing Trial Performed?     C: SAT & SBT Coordinated?  Not applicable                      D: Delirium: CAM-ICU Overall CAM-ICU: Positive   E: Early Mobility Performed? Yes   F: Feeding Goal:    Status:     Current Diet Order   Procedures    Diet diabetic [facility]; 2000 Calorie     Order Specific Question:   Indicate patient location for additional diet options:     Answer:   [facility]     Order Specific Question:   Total calories:     Answer:   2000 Calorie      AS: Analgesia/Sedation Tylenol   T: Thromboembolic Prophylaxis Apixiban   H: HOB > 300 Yes   U: Stress Ulcer Prophylaxis (if needed) Not applicable      G: Glucose Control Scheduled and SSI   B: Bowel Function Stool Occurrence: 1   I: Indwelling Catheter (Lines & Mccord) Necessity PIV   D: De-escalation of Antimicrobials/Pharmacotherapies CTX to Bactrim    Plan for the day/ETD Monitor pending DC to NH    Code Status:  Family/Goals of Care: DNR  Called family       Critical secondary to Patient has a condition that poses threat to life and bodily function: UTI s/p DKA      Critical care was time spent personally by me on the following activities: development of treatment plan with  patient or surrogate and bedside caregivers, discussions with consultants, evaluation of patient's response to treatment, examination of patient, ordering and performing treatments and interventions, ordering and review of laboratory studies, ordering and review of radiographic studies, pulse oximetry, re-evaluation of patient's condition. This critical care time did not overlap with that of any other provider or involve time for any procedures.     Gatito Cesar MD  Critical Care Medicine  [facility] - Cardiac Medical [facility]

## 2023-03-26 NOTE — ASSESSMENT & PLAN NOTE
Restarted on home losartan, amiodarone, metoprolol.    - Add Cardizem, previously DC'd on last admission, for tighter HTN control

## 2023-03-26 NOTE — SUBJECTIVE & OBJECTIVE
Interval History/Significant Events: Patient's mentation back at baseline. Doing well, hyperglycemia being managed by refining insulin regimen.     Review of Systems   Constitutional:  Negative for activity change, appetite change, fatigue and fever.   Respiratory:  Negative for cough, shortness of breath and wheezing.    Cardiovascular:  Negative for chest pain and leg swelling.   Gastrointestinal:  Negative for abdominal pain, constipation, diarrhea, nausea and vomiting.   Skin:  Positive for wound (Scalp wound from surgery, bandage C/D/I, LUE skin graft site bandaged, C/D/I). Negative for rash.   Neurological:  Negative for headaches.   Psychiatric/Behavioral:  Positive for decreased concentration.    Objective:     Vital Signs (Most Recent):  Temp: 97.8 °F (36.6 °C) (03/26/23 0301)  Pulse: 62 (03/26/23 0753)  Resp: 13 (03/26/23 0753)  BP: (!) 167/75 (03/26/23 0601)  SpO2: 98 % (03/26/23 0753)   Vital Signs (24h Range):  Temp:  [97.6 °F (36.4 °C)-98.1 °F (36.7 °C)] 97.8 °F (36.6 °C)  Pulse:  [55-73] 62  Resp:  [10-24] 13  SpO2:  [94 %-99 %] 98 %  BP: (118-182)/(58-92) 167/75   Weight: 74.8 kg (164 lb 14.5 oz)  Body mass index is 21.17 kg/m².      Intake/Output Summary (Last 24 hours) at 3/26/2023 0835  Last data filed at 3/26/2023 0601  Gross per 24 hour   Intake 194.54 ml   Output 615 ml   Net -420.46 ml       Physical Exam    Vents:     Lines/Drains/Airways       Drain  Duration             Male External Urinary Catheter 03/23/23 0758 Medium 3 days              Peripheral Intravenous Line  Duration                  Peripheral IV - Single Lumen 03/23/23 0804 20 G Right Antecubital 3 days         Peripheral IV - Single Lumen 03/23/23 0805 20 G Right Forearm 3 days                  Significant Labs:    CBC/Anemia Profile:  Recent Labs   Lab 03/25/23  0236 03/26/23  0248   WBC 7.24 4.96   HGB 9.8* 10.9*   HCT 31.3* 35.5*    195   MCV 91 91   RDW 14.9* 14.9*        Chemistries:  Recent Labs   Lab  03/25/23  0236 03/26/23  0248   * 145   K 3.5 4.1   * 111*   CO2 24 23   BUN 19 15   CREATININE 1.2 0.9   CALCIUM 8.8 8.4*   ALBUMIN 2.5* 2.5*   PROT 5.5* 5.9*   BILITOT 0.3 0.4   ALKPHOS 152* 165*   ALT 27 27   AST 51* 55*   MG 1.9 1.8   PHOS 2.8 3.2       All pertinent labs within the past 24 hours have been reviewed.    Significant Imaging:  I have reviewed all pertinent imaging results/findings within the past 24 hours.

## 2023-03-26 NOTE — ASSESSMENT & PLAN NOTE
Hold home Toprol, Cardizem, and amiodarone given hypotension. Not on AC. [id number] Score: 4 HASBLED 4.    - Resume amiodarone

## 2023-03-26 NOTE — PLAN OF CARE
CM sent 3 day packet to [facility] to anticipate pts return to their facility today. CM pending discharge orders and will arrange transport. CM following.    03/26/23 1232   Post-Acute Status   Post-Acute Authorization Placement   Post-Acute Placement Status Referrals Sent

## 2023-03-26 NOTE — CARE UPDATE
Called Basia Herrera, daughter, to provide update on current care and anticipated DC back to [facility]. She expressed concern about recurrent UTIs and we discussed possible etiologies.    Called [facility] to clarify med rec and make changes to DC med rec to mirror current meds regimen. NH nurse said that she believes pt gets frequent UTIs because he does not drink any water (just soft drinks). NH will not accept patients back on weekend. Coordinated discharge on Monday with CM on call.

## 2023-03-26 NOTE — ASSESSMENT & PLAN NOTE
- Now on home regimen 6u BID detemir and 5u TIDWM aspart. Clarified with NH that regimen is actually detemir 14u daily and aspart 8u TID with additional SSI aspart prn. Updated in DC med rec.    -Change to 7u BID detemir and continue 5u TIDWM aspart and continue to refine per SSI needs

## 2023-03-26 NOTE — ASSESSMENT & PLAN NOTE
In the setting of sepsis/DKA. Continue to monitor, replete electrolytes. Some concern for dementia, pseudodementia/depression 2/2 his wife of 60 years passing away last year, abruptly quitting working after MI/CVA, and transition to nursing facility.    - Improved to baseline  - Delirium precautions  - Fall precautions

## 2023-03-26 NOTE — PLAN OF CARE
ROSSI spoke to [facility] [place] Supervisor at [facility];s [phone]- She stated that they do not accept patients back on the weekend due to their admission team not working.I confirmed that this is for correction patients as well. CM updated medical team that pt will have to discharge home Monday. CM following.    03/26/23 1739   Discharge Assessment   Assessment Type Discharge Planning Reassessment

## 2023-03-26 NOTE — ASSESSMENT & PLAN NOTE
Patient medically ready for DC on 03/26, but [facility]'s unable to receive patient (even prison patients) until Monday. CM providing appreciated assistance with transition back to NH.

## 2023-03-26 NOTE — ASSESSMENT & PLAN NOTE
Decubitus sacral ulcer 3 mo per daughter from being bed-bound.    - Wound care consulted with appreciated recs for care  - Had graft done at [facility] on [address]

## 2023-03-27 VITALS
RESPIRATION RATE: 19 BRPM | DIASTOLIC BLOOD PRESSURE: 63 MMHG | SYSTOLIC BLOOD PRESSURE: 136 MMHG | HEART RATE: 61 BPM | WEIGHT: 164.88 LBS | BODY MASS INDEX: 21.16 KG/M2 | HEIGHT: 74 IN | TEMPERATURE: 98 F | OXYGEN SATURATION: 96 %

## 2023-03-27 PROBLEM — G93.41 ENCEPHALOPATHY, METABOLIC: Status: RESOLVED | Noted: 2022-01-25 | Resolved: 2023-03-27

## 2023-03-27 PROBLEM — Z75.8 DISCHARGE PLANNING ISSUES: Status: RESOLVED | Noted: 2022-03-02 | Resolved: 2023-03-27

## 2023-03-27 LAB
BASOPHILS # BLD AUTO: 0.02 K/UL (ref 0–0.2)
BASOPHILS NFR BLD: 0.4 % (ref 0–1.9)
DIFFERENTIAL METHOD: ABNORMAL
EOSINOPHIL # BLD AUTO: 0 K/UL (ref 0–0.5)
EOSINOPHIL NFR BLD: 0.7 % (ref 0–8)
ERYTHROCYTE [DISTWIDTH] IN BLOOD BY AUTOMATED COUNT: 14.5 % (ref 11.5–14.5)
HCT VFR BLD AUTO: 36.7 % (ref 40–54)
HGB BLD-MCNC: 11.6 G/DL (ref 14–18)
IMM GRANULOCYTES # BLD AUTO: 0.01 K/UL (ref 0–0.04)
IMM GRANULOCYTES NFR BLD AUTO: 0.2 % (ref 0–0.5)
LYMPHOCYTES # BLD AUTO: 1 K/UL (ref 1–4.8)
LYMPHOCYTES NFR BLD: 20.9 % (ref 18–48)
MAGNESIUM SERPL-MCNC: 1.7 MG/DL (ref 1.6–2.6)
MCH RBC QN AUTO: 28.6 PG (ref 27–31)
MCHC RBC AUTO-ENTMCNC: 31.6 G/DL (ref 32–36)
MCV RBC AUTO: 90 FL (ref 82–98)
MONOCYTES # BLD AUTO: 0.3 K/UL (ref 0.3–1)
MONOCYTES NFR BLD: 6.8 % (ref 4–15)
NEUTROPHILS # BLD AUTO: 3.2 K/UL (ref 1.8–7.7)
NEUTROPHILS NFR BLD: 71 % (ref 38–73)
NRBC BLD-RTO: 0 /100 WBC
PHOSPHATE SERPL-MCNC: 3.2 MG/DL (ref 2.7–4.5)
PLATELET # BLD AUTO: 228 K/UL (ref 150–450)
PMV BLD AUTO: 9.6 FL (ref 9.2–12.9)
POCT GLUCOSE: 192 MG/DL (ref 70–110)
POCT GLUCOSE: 200 MG/DL (ref 70–110)
POCT GLUCOSE: 208 MG/DL (ref 70–110)
POCT GLUCOSE: 223 MG/DL (ref 70–110)
POCT GLUCOSE: 226 MG/DL (ref 70–110)
POCT GLUCOSE: 239 MG/DL (ref 70–110)
RBC # BLD AUTO: 4.06 M/UL (ref 4.6–6.2)
SARS-COV-2 RNA RESP QL NAA+PROBE: NOT DETECTED
WBC # BLD AUTO: 4.54 K/UL (ref 3.9–12.7)

## 2023-03-27 PROCEDURE — 25000003 PHARM REV CODE 250

## 2023-03-27 PROCEDURE — 85025 COMPLETE CBC W/AUTO DIFF WBC: CPT

## 2023-03-27 PROCEDURE — 83735 ASSAY OF MAGNESIUM: CPT

## 2023-03-27 PROCEDURE — 36415 COLL VENOUS BLD VENIPUNCTURE: CPT

## 2023-03-27 PROCEDURE — 84100 ASSAY OF PHOSPHORUS: CPT

## 2023-03-27 PROCEDURE — 99239 PR HOSPITAL DISCHARGE DAY,>30 MIN: ICD-10-PCS | Mod: ,,, | Performed by: HOSPITALIST

## 2023-03-27 PROCEDURE — 99239 HOSP IP/OBS DSCHRG MGMT >30: CPT | Mod: ,,, | Performed by: HOSPITALIST

## 2023-03-27 PROCEDURE — U0005 INFEC AGEN DETEC AMPLI PROBE: HCPCS | Performed by: HOSPITALIST

## 2023-03-27 RX ORDER — SULFAMETHOXAZOLE AND TRIMETHOPRIM 800; 160 MG/1; MG/1
1 TABLET ORAL 2 TIMES DAILY
Qty: 2 TABLET | Refills: 0
Start: 2023-03-27 | End: 2023-04-01

## 2023-03-27 RX ADMIN — INSULIN ASPART 5 UNITS: 100 INJECTION, SOLUTION INTRAVENOUS; SUBCUTANEOUS at 11:03

## 2023-03-27 RX ADMIN — PANTOPRAZOLE SODIUM 40 MG: 40 TABLET, DELAYED RELEASE ORAL at 08:03

## 2023-03-27 RX ADMIN — INSULIN ASPART 5 UNITS: 100 INJECTION, SOLUTION INTRAVENOUS; SUBCUTANEOUS at 07:03

## 2023-03-27 RX ADMIN — AMIODARONE HYDROCHLORIDE 200 MG: 200 TABLET ORAL at 08:03

## 2023-03-27 RX ADMIN — DILTIAZEM HYDROCHLORIDE 30 MG: 30 TABLET, FILM COATED ORAL at 06:03

## 2023-03-27 RX ADMIN — LIDOCAINE 1 PATCH: 50 PATCH CUTANEOUS at 11:03

## 2023-03-27 RX ADMIN — APIXABAN 5 MG: 5 TABLET, FILM COATED ORAL at 08:03

## 2023-03-27 RX ADMIN — INSULIN ASPART 5 UNITS: 100 INJECTION, SOLUTION INTRAVENOUS; SUBCUTANEOUS at 04:03

## 2023-03-27 RX ADMIN — LOSARTAN POTASSIUM 25 MG: 25 TABLET, FILM COATED ORAL at 08:03

## 2023-03-27 RX ADMIN — TAMSULOSIN HYDROCHLORIDE 0.4 MG: 0.4 CAPSULE ORAL at 08:03

## 2023-03-27 RX ADMIN — LACOSAMIDE 100 MG: 50 TABLET, FILM COATED ORAL at 08:03

## 2023-03-27 RX ADMIN — DILTIAZEM HYDROCHLORIDE 30 MG: 30 TABLET, FILM COATED ORAL at 11:03

## 2023-03-27 RX ADMIN — DILTIAZEM HYDROCHLORIDE 30 MG: 30 TABLET, FILM COATED ORAL at 05:03

## 2023-03-27 RX ADMIN — INSULIN DETEMIR 6 UNITS: 100 INJECTION, SOLUTION SUBCUTANEOUS at 08:03

## 2023-03-27 RX ADMIN — ATORVASTATIN CALCIUM 40 MG: 40 TABLET, FILM COATED ORAL at 08:03

## 2023-03-27 RX ADMIN — METOPROLOL SUCCINATE 25 MG: 25 TABLET, EXTENDED RELEASE ORAL at 08:03

## 2023-03-27 RX ADMIN — SULFAMETHOXAZOLE AND TRIMETHOPRIM 1 TABLET: 800; 160 TABLET ORAL at 08:03

## 2023-03-27 NOTE — PLAN OF CARE
Sent NH orders to [facility] in [facility] for review and notified Renée in admissions. Per Renée, patient will need a covid test prior to return and also requested MAR. Renée reports NH will review clinicals to review for SNF potential/criteria and will update SW if orders need to be updated. Covid test ordered and MAR was sent in [facility].    10:06 AM  Spoke with Renée in admissions at [facility] who reports after review, patient has a diagnosis that does qualify him for SNF and requested orders to be updated for SNF. SW discussed with MD and sent updated SNF orders in [facility] to [facility].    10:25 AM  Per Renée at [facility], requesting changes to the diet orders. Updated MD.    1:00 PM  Sent updated SNF orders and covid test result to [facility] in [facility]. Notified Renée in admissions who reports she will return call to  with report information shortly.    1:34 PM   03/27/23 1334   Post-Acute Status   Post-Acute Authorization Placement   Post-Acute Placement Status Set-up Complete/Auth obtained   Per Renée in admissions at [facility] SNF, nurse can call report to nurse on [address] [place] at [phone] and patient will go to room 112. Updated RN and MD.    JASIEL arranged stretcher transport via [facility]. Requested  time is 2:30 PM. Requested  time does not guarantee arrival time.    Updated patient's daughter, Basia, on above.    Bess Mathews, PERCY  [facility]  Ext. [phone]

## 2023-03-27 NOTE — PLAN OF CARE
CHW met with patient/family at bedside. Patient experience rounding completed and reviewed the following.     Do you know your discharge plan? Yes,    If yes, what is the plan? SNF    If you are discharging home, do you have help at home? SNF    Do you think you will need help at home at discharge? SNF    Have you discussed your needs and preferences with your SW/CM? Yes    Assigned SW/CM notified of any patient/family needs or concerns.

## 2023-03-27 NOTE — PLAN OF CARE
[facility]     Department of Hospital Medicine     [address] [place] [address]     [phone] after hours  [phone] fax       NURSING HOME ORDERS    Patient Name: Kamar Muñoz  YOB: [birth year]    Admit to Nursing Home:  Skilled Bed         Diagnoses:  Active Hospital Problems    Diagnosis  POA    Nursing home resident [Z59.3]  Not Applicable     Chronic     [facility]      Pressure injury of buttock, unstageable [L89.300]  Yes    Alteration in skin integrity [R23.9]  Yes    History of partial seizures [Z86.69]  Not Applicable     Chronic    Paroxysmal atrial fibrillation [I48.0]  Yes     Chronic    History of embolic stroke [Z86.73]  Not Applicable     Chronic    Coronary artery disease involving native coronary artery of native heart with unstable angina pectoris [I25.110]  Yes     Chronic    Type 2 diabetes mellitus with hyperglycemia, with long-term current use of insulin [E11.65, Z79.4]  Not Applicable     Chronic    Essential hypertension [I10]  Yes     Chronic      Resolved Hospital Problems    Diagnosis Date Resolved POA    *Diabetic ketoacidosis without coma associated with type 2 diabetes mellitus [E11.10] 03/24/2023 Unknown    Discharge planning issues [Z02.9] 03/27/2023 Not Applicable    Encephalopathy, metabolic [G93.41] 03/27/2023 Yes    BRENDAN (acute kidney injury) [N17.9] 03/25/2023 Unknown       Patient is homebound due to:  Diabetic ketoacidosis without coma associated with type 2 diabetes mellitus    Allergies:  Review of patient's allergies indicates:   Allergen Reactions    Iodine      Other reaction(s): swelling  Other reaction(s): Itching  Other reaction(s): Rash / IVP       Vitals:     Routine    Discharge Procedure Orders   No concentrated sugars   Order Comments: Drink more water, less soft drinks     Change dressing (specify)   Order Comments: Sacrum, buttocks and scrotum:  cleanse with soap and and water, pat dry and apply triad BID and PRN; no dressings nor diapers     Notify your health care provider if you experience any of the following:  persistent nausea and vomiting or diarrhea     Notify your health care provider if you experience any of the following:  increased confusion or weakness     Notify your health care provider if you experience any of the following:  persistent dizziness, light-headedness, or visual disturbances     Activity as tolerated     LABS:  Per facility protocol   BMP in 5 days to check for hyperkalemia often caused by Bactrim    Nursing Precautions:       - Fall precautions per nursing home protocol   - Decubitus precautions:        -  for positioning   - Pressure reducing foam mattress   - Turn patient every two hours. Use wedge pillows to anchor patient    CONSULTS   Physical Therapy to evaluate and treat   Occupational Therapy to evaluate and treat    MISCELLANEOUS CARE:     Routine Skin for Bedridden Patients:  Apply moisture barrier cream to all    skin folds and wet areas in perineal area daily and after baths and                           all bowel movements.    DIABETES CARE:    Check blood sugar:      Fingerstick blood sugar AC and HS   Fingerstick blood sugar every 6 hours if unable to eat      Report CBG < 60 or > 400 to physician.                                          Insulin Sliding Scale          Glucose  Novolog Insulin Subcutaneous        0 - 60   Orange juice or glucose tablet, hold insulin      No insulin   201-250  2 units   251-300  4 units   301-350  6 units   351-400  8 units   >400   10 units then call physician      Medications:   Reconciled Home Medications:      Medication List        START taking these medications      sulfamethoxazole-trimethoprim 800-160mg 800-160 mg Tab  Commonly known as: BACTRIM DS  Take 1 tablet by mouth 2 (two) times daily. End date 4/1/2023 for 5 days            CHANGE how you take these  medications      insulin aspart U-100 100 unit/mL (3 mL) Inpn pen  Commonly known as: NovoLOG  Inject 8 Units into the skin 3 (three) times daily with meals.  What changed: how much to take     insulin detemir U-100 (Levemir) 100 unit/mL (3 mL) Inpn pen  Inject 14 Units into the skin once daily.  What changed:   how much to take  when to take this            CONTINUE taking these medications      acetaminophen 500 MG tablet  Commonly known as: TYLENOL  Take 2 tablets (1,000 mg total) by mouth every 8 (eight) hours.     amiodarone 200 MG Tab  Commonly known as: PACERONE  Take 1 tablet (200 mg total) by mouth once daily.     atorvastatin 40 MG tablet  Commonly known as: LIPITOR  Take 1 tablet (40 mg total) by mouth once daily.     blood sugar diagnostic Strp  1 strip by Misc.(Non-Drug; Combo Route) route 2 (two) times a day.     ELIQUIS 5 mg Tab  Generic drug: apixaban  Take 5 mg by mouth 2 (two) times daily.     gabapentin 100 MG capsule  Commonly known as: NEURONTIN  Take 1 capsule (100 mg total) by mouth 2 (two) times daily.     * glucose 4 GM chewable tablet  Take 4 tablets (16 g total) by mouth as needed for Low blood sugar (50 - 70).     * glucose 4 GM chewable tablet  Take 6 tablets (24 g total) by mouth as needed for Low blood sugar (< 50).     lancets Misc  Commonly known as: ONETOUCH ULTRASOFT LANCETS  1 lancet by Misc.(Non-Drug; Combo Route) route 2 (two) times a day.     LANTUS SOLOSTAR U-100 INSULIN glargine 100 units/mL SubQ pen  Generic drug: insulin  Inject into the skin.     losartan 25 MG tablet  Commonly known as: COZAAR  Take 25 mg by mouth once daily.     metFORMIN 500 MG tablet  Commonly known as: GLUCOPHAGE  Take 500 mg by mouth 2 (two) times daily.     metoprolol succinate 25 MG 24 hr tablet  Commonly known as: TOPROL-XL  Take 25 mg by mouth once daily.     nitroGLYCERIN 0.4 MG SL tablet  Commonly known as: NITROSTAT  Place 1 tablet (0.4 mg total) under the tongue every 5 (five) minutes as  "needed for Chest pain.     ondansetron 4 MG tablet  Commonly known as: ZOFRAN  Take 4 mg by mouth every 8 (eight) hours as needed.     pantoprazole 40 MG tablet  Commonly known as: PROTONIX  Take 1 tablet (40 mg total) by mouth once daily.     pen needle, diabetic 30 gauge x 5/16" Ndle  Commonly known as: PEN NEEDLE  1 Units by Misc.(Non-Drug; Combo Route) route 3 (three) times daily.     polyethylene glycol 17 gram/dose powder  Commonly known as: GLYCOLAX  Use cap to measure out (17 g) mix with a liquid and take by mouth once daily.     SITagliptin phosphate 50 MG Tab  Commonly known as: JANUVIA  Take 1 tablet (50 mg total) by mouth once daily.     sucralfate 1 gram tablet  Commonly known as: CARAFATE  Take 1 g by mouth 3 (three) times daily.     tamsulosin 0.4 mg Cap  Commonly known as: FLOMAX  Take 1 capsule by mouth once daily.     vitamin D 1000 units Tab  Commonly known as: VITAMIN D3  Take 1,000 Units by mouth once daily.           * This list has 2 medication(s) that are the same as other medications prescribed for you. Read the directions carefully, and ask your doctor or other care provider to review them with you.                STOP taking these medications      lacosamide 100 mg Tab  Commonly known as: VIMPAT                    Electronically signed  _________________________________  Alex Anne MD  03/27/2023    "

## 2023-03-27 NOTE — PLAN OF CARE
[facility] - Telemetry Stepdown  Discharge Final Note    Primary Care Provider: Basim Guerrero MD    Expected Discharge Date: 3/27/2023    Final Discharge Note (most recent)       Final Note - 03/27/23 1336          Final Note    Assessment Type Final Discharge Note     Anticipated Discharge Disposition [facility]   [facility]    Hospital Resources/Appts/Education Provided --   per NH/SNF       Post-Acute Status    Post-Acute Authorization Placement     Post-Acute Placement Status Set-up Complete/Auth obtained   [facility]- resident returning as SNF    Discharge Delays None known at this time                     Important Message from Medicare  Important Message from Medicare regarding Discharge Appeal Rights: Given to patient/caregiver, Explained to patient/caregiver, Signed/date by patient/caregiver     Date IMM was signed: 03/27/23  Time IMM was signed: 0956    Contact Info       [facility]   Specialty: Nursing Home Agency, SNF Agency    [address] [place] [address]   Phone: [phone]       Next Steps: Follow up    Instructions: [facility], Nursing Home        Bess Mathews LCSW  [facility]  Ext. [phone]

## 2023-03-27 NOTE — DISCHARGE SUMMARY
[facility]  Discharge Summary      Patient Name: Kamar Muñoz  MRN: [id number]  [facility]: [id number]  Patient Class: IP- Inpatient  Admission Date: 3/23/2023  Hospital Length of Stay: 4 days  Discharge Date and Time: 3/27/2023  7:32 PM  Attending Physician: Alex Anne MD   Discharging Provider: Alex Anne MD  Primary Care Provider: Basim Guerrero MD  [facility] Medicine Team: [facility] Alex Anne MD  Primary Care Team: [facility]    HPI:   Kamar Muñoz is a 77 year old white man with hypertension, diabetes mellitus type 2 (treated with insulin) with peripheral neuropathy, coronary artery disease with history of ST elevation myocardial infarction status post percutaneous coronary intervention with stent placements in mid right coronary artery on 1/9/2022, paroxysmal atrial fibrillation, history of embolic stroke in right frontal lobe and left cerebellar hemisphere on 1/12/2022, history of partial seizures, sacral decubitus ulcer, history of L1 lumbar compression fracture status post percutaneous vertebral body augmentation on 2/1/2023, history of right clavicle fracture on 2/27/2023. He lives at [facility] in [place], [place]. His primary care physician is Dr. Basim Guerrero. He is DNR.   He presented to [facility] on 3/23/2023 with waxing and waning mentation, weakness, nausea, vomiting, and increased urinary frequency that started 4 to 5 days ago. He also had shortness of breath associated with his recent clavicle fracture and a history of diarrhea for 3 months that started after starting a chronic antibiotic to treat a sacral decubitus ulcer he developed from being bed bound due debility from his myocardial infarction and subsequent stroke. He did not have an adequate sling for his clavicle fracture. He was only able to answer yes/no questions, so collateral information  "was provided by his daughter Basia on admission. Basia said she was unsure if he was getting his prescribed insulin regimen at the nursing home. A nurse at the nursing home recently said his blood glucose was "all over the place", although his hemoglobin A1c had improved from 10.6% on 10/5/2022 to 8.9% on 2/28/2023. His left upper extremity was bandaged from a skin graft site used to cover an excised skin cancer removed on 3/20/2023. His blood glucose was 795 mg/dL in the emergency department, with an anion gap of 35 and beta-hydroxybutyrate of 5.3 mmol/L, lactate of 6.2 mmol/L, BUN and creatinine of 28 mg/dL and 2.3 mg/dL (from 18 and 1.0 on 3/1/2023), and potassium of 5.2 mmol/L. Chest X-ray suggested left lower lobe pneumonia. He had no urine to obtain urinalysis. He was put on broad spectrum antibiotics. He was admitted to Critical Care Medicine.          Hospital Course:   Diabetic ketoacidosis was treated until anion gap closed. BUN and creatinine improved to 22 and 1.7. he was put on scheduled insulin detemir and aspart. Antibiotics were changed to ceftriaxone. Wound Care recommended caring for sacrum, buttocks, and scrotum with soap and water, patting dry, and applying triad ointment twice daily and as needed. He was stable for stepdown from the ICU on 3/24/2023 but could not get a floor bed until 3/27/2023. In the meantime, his antibiotic was changed to trimethoprim-sulfamethoxazole and preparations were made for discharge back to his nursing home on Monday 3/27/2023. His daughter asked why he had frequent urinary tract infections. Nursing home staff reported that he drinks a lot of soft drinks and not much water.       Goals of Care Treatment Preferences:  Code Status: DNR      Consults:   Consults (From admission, onward)          Status Ordering Provider     IP consult to case management  Once        Provider:  (Not yet assigned)    Completed KIM JULIEN     Pharmacy to dose Vancomycin " consult  Once        Provider:  (Not yet assigned)   See Hyperspace for full Linked Orders Report.    Completed SHALOM SILVERIO     Inpatient consult to Registered Dietitian/Nutritionist  Once        Provider:  (Not yet assigned)    Completed SHALOM SILVERIO     Inpatient consult to Critical Care Medicine  Once        Provider:  (Not yet assigned)    Completed FINA NORRIS          Final Active Diagnoses:    Diagnosis Date Noted POA    Nursing home resident [Z59.3] 03/24/2023 Not Applicable     Chronic    Pressure injury of buttock, unstageable [L89.300] 02/28/2022 Yes    Alteration in skin integrity [R23.9] 02/01/2022 Yes    History of partial seizures [Z86.69] 01/26/2022 Not Applicable     Chronic    Paroxysmal atrial fibrillation [I48.0] 01/12/2022 Yes     Chronic    History of embolic stroke [Z86.73] 01/12/2022 Not Applicable     Chronic    Coronary artery disease involving native coronary artery of native heart with unstable angina pectoris [I25.110] 01/09/2022 Yes     Chronic    Type 2 diabetes mellitus with hyperglycemia, with long-term current use of insulin [E11.65, Z79.4] 03/08/2013 Not Applicable     Chronic    Essential hypertension [I10] 07/20/2012 Yes     Chronic      Problems Resolved During this Admission:    Diagnosis Date Noted Date Resolved POA    PRINCIPAL PROBLEM:  Diabetic ketoacidosis without coma associated with type 2 diabetes mellitus [E11.10]  03/24/2023 Unknown    Discharge planning issues [Z02.9] 03/02/2022 03/27/2023 Not Applicable    Encephalopathy, metabolic [G93.41] 01/25/2022 03/27/2023 Yes    BRENDAN (acute kidney injury) [N17.9] 08/30/2016 03/25/2023 Unknown       Discharged Condition: good    Disposition: halfway Nursing Home    Follow Up:   Follow-up Information       [facility] Follow up.    Specialties: Nursing Home Agency, SNF Agency  Why: halfway, Nursing Home  Contact information:  [address] [place] [id number] [phone]                            Patient Instructions:      Diet diabetic   Order Comments: Drink more water, less soft drinks     Change dressing (specify)   Order Comments: Sacrum, buttocks and scrotum: cleanse with soap and and water, pat dry and apply triad BID and PRN; no dressings nor diapers     Notify your health care provider if you experience any of the following:  persistent nausea and vomiting or diarrhea     Notify your health care provider if you experience any of the following:  increased confusion or weakness     Notify your health care provider if you experience any of the following:  persistent dizziness, light-headedness, or visual disturbances     Activity as tolerated       Significant Diagnostic Studies:  Recent Labs   Lab 03/24/23  0419 03/25/23  0236 03/26/23  0248   * 148* 145   K 3.6 3.5 4.1   * 112* 111*   CO2 22* 24 23   BUN 22 19 15   CREATININE 1.7* 1.2 0.9   CALCIUM 8.8 8.8 8.4*   PROT 5.9* 5.5* 5.9*   BILITOT 0.3 0.3 0.4   ALKPHOS 155* 152* 165*   ALT 24 27 27   AST 35 51* 55*     CT Head Without Contrast 3/23/23: FINDINGS:   Intracranial compartment:   Large remote right frontal infarct (MCA distribution) moderate-sized remote left cerebellar infarct.  Mild chronic small vessel ischemic changes.  No new territorial infarct.  No acute parenchymal hemorrhage no new intracranial mass effect or midline shift.   Stable pattern of cerebral volume loss with prominence of the ventricles and sulci.  No evidence of hydrocephalus.   No extra-axial blood or fluid collections.   Skull/extracranial contents (limited evaluation):   Right parietal extracranial soft tissue swelling with subcutaneous emphysema in keeping with the laceration.  Overlying bandage material in place.  No evidence of an associated calvarial fracture.   The mastoid air cells and visualized paranasal sinuses are essentially clear.   Impression:  Extracranial soft tissue swelling/laceration without evidence of underlying fracture  or acute intracranial hemorrhage.   X-Ray Chest AP Portable 3/23/23: FINDINGS:   The lungs appear hyperinflated.  Pulmonary interstitial and vascular markings may be rendered increasing conspicuity by emphysema.  There is mild pulmonary venous congestion.  There is increased radiodensity of the left lower lung zone, concerning for a developing consolidation.   There is a vague 1.5 cm radiodensity over the left upper lung zone, which on a CT 04/18/2022 appears to correlate to hypertrophic changes of the left costo manubrial junction.   The heart size appears normal.  There is mild calcification of the aortic knob consistent with atherosclerotic stigmata.   The bones appear unremarkable for the age of the patient.      Medications:  Reconciled Home Medications:      Medication List        START taking these medications      sulfamethoxazole-trimethoprim 800-160mg 800-160 mg Tab  Commonly known as: BACTRIM DS  Take 1 tablet by mouth 2 (two) times daily. End date 4/1/2023 for 5 days            CHANGE how you take these medications      insulin aspart U-100 100 unit/mL (3 mL) Inpn pen  Commonly known as: NovoLOG  Inject 8 Units into the skin 3 (three) times daily with meals.  What changed: how much to take     insulin detemir U-100 (Levemir) 100 unit/mL (3 mL) Inpn pen  Inject 14 Units into the skin once daily.  What changed:   how much to take  when to take this            CONTINUE taking these medications      acetaminophen 500 MG tablet  Commonly known as: TYLENOL  Take 2 tablets (1,000 mg total) by mouth every 8 (eight) hours.     amiodarone 200 MG Tab  Commonly known as: PACERONE  Take 1 tablet (200 mg total) by mouth once daily.     atorvastatin 40 MG tablet  Commonly known as: LIPITOR  Take 1 tablet (40 mg total) by mouth once daily.     blood sugar diagnostic Strp  1 strip by Misc.(Non-Drug; Combo Route) route 2 (two) times a day.     ELIQUIS 5 mg Tab  Generic drug: apixaban  Take 5 mg by mouth 2 (two) times  "daily.     gabapentin 100 MG capsule  Commonly known as: NEURONTIN  Take 1 capsule (100 mg total) by mouth 2 (two) times daily.     * glucose 4 GM chewable tablet  Take 4 tablets (16 g total) by mouth as needed for Low blood sugar (50 - 70).     * glucose 4 GM chewable tablet  Take 6 tablets (24 g total) by mouth as needed for Low blood sugar (< 50).     lancets Misc  Commonly known as: ONETOUCH ULTRASOFT LANCETS  1 lancet by Misc.(Non-Drug; Combo Route) route 2 (two) times a day.     LANTUS SOLOSTAR U-100 INSULIN glargine 100 units/mL SubQ pen  Generic drug: insulin  Inject into the skin.     losartan 25 MG tablet  Commonly known as: COZAAR  Take 25 mg by mouth once daily.     metFORMIN 500 MG tablet  Commonly known as: GLUCOPHAGE  Take 500 mg by mouth 2 (two) times daily.     metoprolol succinate 25 MG 24 hr tablet  Commonly known as: TOPROL-XL  Take 25 mg by mouth once daily.     nitroGLYCERIN 0.4 MG SL tablet  Commonly known as: NITROSTAT  Place 1 tablet (0.4 mg total) under the tongue every 5 (five) minutes as needed for Chest pain.     ondansetron 4 MG tablet  Commonly known as: ZOFRAN  Take 4 mg by mouth every 8 (eight) hours as needed.     pantoprazole 40 MG tablet  Commonly known as: PROTONIX  Take 1 tablet (40 mg total) by mouth once daily.     pen needle, diabetic 30 gauge x 5/16" Ndle  Commonly known as: PEN NEEDLE  1 Units by Misc.(Non-Drug; Combo Route) route 3 (three) times daily.     polyethylene glycol 17 gram/dose powder  Commonly known as: GLYCOLAX  Use cap to measure out (17 g) mix with a liquid and take by mouth once daily.     SITagliptin phosphate 50 MG Tab  Commonly known as: JANUVIA  Take 1 tablet (50 mg total) by mouth once daily.     sucralfate 1 gram tablet  Commonly known as: CARAFATE  Take 1 g by mouth 3 (three) times daily.     tamsulosin 0.4 mg Cap  Commonly known as: FLOMAX  Take 1 capsule by mouth once daily.     vitamin D 1000 units Tab  Commonly known as: VITAMIN D3  Take 1,000 " Units by mouth once daily.           * This list has 2 medication(s) that are the same as other medications prescribed for you. Read the directions carefully, and ask your doctor or other care provider to review them with you.                STOP taking these medications      lacosamide 100 mg Tab  Commonly known as: VIMPAT              Indwelling Lines/Drains at time of discharge: None    Time spent on the discharge of patient: 35 minutes         Alex Anne MD  Department of Hospital Medicine  [facility] - Telemetry Stepdown

## 2023-03-27 NOTE — PLAN OF CARE
Pt is alert oriented, confusion noted at times. Resp even and unlabored. No SOB noted at this time. [facility] Condom cath in place, with yellow urine noted. Bed in low position, side rails up and call light within reach. Assessment completed, monitoring ongoing.

## 2023-03-28 LAB
BACTERIA BLD CULT: NORMAL
BACTERIA BLD CULT: NORMAL

## 2023-03-31 NOTE — PHYSICIAN QUERY
PT Name: Kamar Muñoz  MR #: [id number]     Documentation Clarification      CDS/: Zoey Jacob RN CDS          Contact information:[email or web address]    This form is a permanent document in the medical record.     Query Date: March 31, 2023    By submitting this query, we are merely seeking further clarification of documentation. Please utilize your independent clinical judgment when addressing the question(s) below.    The Medical Record reflects the following:    Indicators Supporting Clinical Findings Location in Medical Record    X HR         RR          BP        Temp Vital Signs (24h Range):  Temp:  [97.1 °F (36.2 °C)-100 °F (37.8 °C)] 97.1 °F (36.2 °C)  Pulse:  [] 80  Resp:  [18-22] 19  SpO2:  [96 %-100 %] 99 %  BP: ()/(51-72) 102/51    Vital Signs (24h Range):  Temp:  [96.1 °F (35.6 °C)-98.2 °F (36.8 °C)] 98.2 °F (36.8 °C)  Pulse:  [70-78] 78  Resp:  [10-21] 20  SpO2:  [91 %-98 %] 96 %  BP: (112-148)/(45-72) 123/56    Vital Signs (24h Range):  Temp:  [97.6 °F (36.4 °C)-98.1 °F (36.7 °C)] 97.8 °F (36.6 °C)  Pulse:  [55-73] 62  Resp:  [10-24] 13  SpO2:  [94 %-99 %] 98 %  BP: (118-182)/(58-92) 167/75   H&P, Dr. Cesar/Dr. Reinoso, 3/23                CCM, Dr. Cesar/Dr. Reinoso, 3/24                CCM, Dr. Cesar/Dr. Reinoso, 3/26    X Lactic Acid          Procalcitonin Lactate 6.2   Labs, 3/23    X WBC           Bands          CRP      03/23/23 07:16 03/24/23 04:19 03/25/23 02:36 03/26/23 02:48   WBC  8.64 10.44 7.24 4.96    Labs, 3/23-3/26    X Culture(s) Blood Culture no growth after 5 days    Urine Culture no significant growth   Labs, 3/23    Labs, 3/25    X AMS, Confusion, LOC, etc. He has been able to respond incoherently but remains alert.     He is disoriented.     Mentation improved, but still intermittently confused.      ED, Dr. Allen/Dr. Montesinos, 3/23      CCM, Dr. Cesar/Dr. Reinoso, 3/24    X Organ Dysfunction/Failure Encephalopathy, metabolic In the setting  of sepsis/DKA    Diabetic ketoacidosis without coma associated with type 2 diabetes mellitus  Likely induced by sepsis    H&P, Dr. Cesar/Dr. Reinoso, 3/23    X Bacteremia or Sepsis / Septic Encephalopathy, metabolic In the setting of sepsis/DKA    Diabetic ketoacidosis without coma associated with type 2 diabetes mellitus  Likely induced by sepsis    H&P, Dr. Cesar/Dr. Reinoso, 3/23     X Known or Suspected Source of Infection documented Sepsis with suspected source UTI vs. PNA vs. Skin/soft tissue infection   H&P,/,3/23                (Failed) Outpatient Treatment      Medication      X Treatment NS 1L bolus    Vanc 1500mg IVPB one time in ED    Zosyn 4.5gm IVPB everey 8 hours, DC on 3/24    Rocephin 1gm IVPB every 24 hours   ED, Dr. Allen/Dr. Montesinos, 3/23    MAR, 3/23    MAR, 3/24    MAR, 3/24    Other              Due to the conflicting clinical picture, please clinically validate the diagnosis of Sepsis  If validated, please provide additional clinical support for the diagnosis.       [   ] Above stated diagnosis is not confirmed and/or it has been ruled out   [  X ] Above stated diagnosis is confirmed. Please specify clinical support (signs & symptoms) for the confirmed diagnosis: ____________________   [   ] Other clarification (please specify): ___________________           Form No. [id number]

## 2023-03-31 NOTE — PHYSICIAN QUERY
PT Name: Kamar Muñoz  MR #: [id number]     DOCUMENTATION CLARIFICATION     CDS/: Zoey Jacob RN CDS              Contact information:[email or web address]  This form is a permanent document in the medical record.     Query Date: March 31, 2023    By submitting this query, we are merely seeking further clarification of documentation.  Please utilize your independent clinical judgment when addressing the question(s) below.    The Medical Record contains the following:   Indicators   Supporting Clinical Findings Location in Medical Record      Non-blanchable erythema/redness       X Ulcer/Injury/Skin Breakdown Decubitus sacral ulcer 3 mo per daughter from being bed-bound   Tali GIRON/Dr. Reinoso, 3/24    Deep Tissue Injury       X Wound care consult The sacrum has a 0.1xo.4xo.6cm ulcer on the lower right portion of the sacrum. The periwound is intact, pink and non-blanchable.    Altered Skin Integrity Present on Admission: yes  Location: Sacral spine  Partial thickness tissue loss. Shallow open ulcer with a red or pink wound bed, without slough. Intact or Open/Ruptured Serum-filled blister           Wound Care, 3/24     X Acute/Chronic Illness Acute illness: metabolic encephalopathy, BRENDAN, DM DKA,     Chronic illness: T2DM (last A1C 8.9) complicated by peripheral neuropathy, pAF (on apixaban), seizure disorder, HLD, previous CVA, and CAD s/p PCI to RCA (on clopidogrel   H&P, Tali/Dr. Reinoso, 3/23     X Medication/Treatment Sacrum, buttocks and scrotum: nursing to cleanse with soap and and water, pat dry and apply triad BID and PRN; no dressings nor diapers   Wound Care, 3/24    Other       The clinical guidelines noted are only a system guideline. It does not replace the providers clinical judgment.    Per the National Pressure Injury Advisory Panel:   A pressure injury is localized damage to the skin and underlying soft tissue usually over a bony prominence or related to a medical or other device.  The injury can present as intact skin or an open ulcer and may be painful. The injury occurs as a result of intense and/or prolonged pressure or pressure in combination with shear. The tolerance of soft tissue for pressure and shear may also be affected by microclimate, nutrition, perfusion, co-morbidities and condition of the soft tissue.       Stage 1 Pressure Injury:  Intact skin with a localized area of non-blanchable erythema, which may appear differently in darkly pigmented skin. Color changes do not include purple or maroon discoloration; these may indicate deep tissue pressure injury.    Stage 2 Pressure Injury:  Partial-thickness loss of skin with exposed dermis. The wound bed is viable, pink or red, moist, and may also present as an intact or ruptured serum-filled blister.    Stage 3 Pressure Injury:  Full-thickness loss of skin, in which adipose (fat) is visible in the ulcer and granulation tissue and epibole (rolled wound edges) are often present. Slough and/or eschar may be visible. Undermining and tunneling may occur.    Stage 4 Pressure Injury:  Full-thickness skin and tissue loss with exposed or directly palpable fascia, muscle, tendon, ligament, cartilage or bone in the ulcer. Slough and/or eschar may be visible. Epibole (rolled edges), undermining and/or tunneling often occur.    Unstageable Pressure Injury:  Full-thickness skin and tissue loss in which the extent of tissue damage within the ulcer cannot be confirmed because it is obscured by slough or eschar. If slough or eschar is removed, a Stage 3 or Stage 4 pressure injury will be revealed.        Provider, please clarify the stage of the Decubitus Sacral Ulcer.     [   ] Pressure Injury/Decubitus Ulcer, Stage 1   [  X ] Pressure Injury/Decubitus Ulcer, Stage 2- Present on admission   [   ] Other stage (please specify):______________   [  ] Clinically Undetermined       Reference:    ROSALINA Levi., KARLI Goodson., Goldberg, M., EJ Ferro.,  ROSALINA Gomez, & SOCORRO Batista (2016). Revised National Pressure Ulcer Advisory Panel Pressure Injury Staging System: Revised Pressure Injury Staging System. J Wound Ostomy Continence Nurs, 43(6), 585-597. doi:10.1097/won.0199313513096144    Form No.[id number]

## 2023-05-21 NOTE — PROCEDURES
DATE: 3/1/23    EEG NUMBER: FH -[id number]    REFERRING PHYSICIAN:  Loreta      This EEG was performed to assess for subclinical seizures      ELECTROENCEPHALOGRAM REPORT     METHODOLOGY:  Electroencephalographic (EEG) recording is with electrodes placed according to the International 10-20 placement system.  Thirty two (32) channels of digital signal are simultaneously recorded from the scalp and may include EKG, EMG, and/or eye monitors.   Recording band pass was 0.1 to 512 hz.  Digital video recording of the patient is simultaneously recorded with the EEG.  The nursing staff report clinical symptoms and may press an event button when the patient has symptoms of clinical interest to the treating physicians.  EEG and video recording is stored and archived in digital format.  The entire recording is visually reviewed, and the times identified by computer analysis as being spikes or seizures are reviewed again.  Activation procedures which include photic stimulation, hyperventilation and instructing patients to perform simple task are done in selected patients.   Compresses spectral analysis (CSA) is also performed on the activity recorded from each individual channel.  This is displayed as a power display of frequencies from 0 to 30 Hz over time.   The CSA analysis is done and displayed continuously.  This is reviewed for asymmetries in power between homologous areas of the scalp and for presence of changes in power which can be seen when seizures occur.  Sections of suspected abnormalities on the CSA is then compared with the original EEG recording.                Dopios software was also utilized in the review of this study.  This software suite analyzes the EEG recording in multiple domains.  Coherence and rhythmicity is computed to identify EEG sections which may contain organized seizures.  Each channel undergoes analysis to detect presence of spike and sharp waves which have special and morphological  characteristic of epileptic activity.  The routine EEG recording is converted from spacial into frequency domain.  This is then displayed comparing homologous areas to identify areas of significant asymmetry.  Algorithm to identify non-cortically generated artifact is used to separate eye movement, EMG and other artifact from the EEG.     EEG FINDINGS:  The recording was obtained with a number of standard bipolar and referential montages during wakefulness, drowsiness.  In the alert state, the posterior background rhythm was a symmetric, well-modulated 10 Hz activity, which reacted symmetrically to eye opening.  Nearly continuous mixed theta range slowing was noted in the right frontal central region.  During drowsiness, the background rhythm waxed and waned and there were periods of slowing.  There were no interictal epileptiform abnormalities and no clinical or electrographic seizures were recorded.    The EKG channel revealed a sinus rhythm.     IMPRESSION:  This is an abnormal EEG during wakefulness, drowsiness. Nearly continuous mixed theta range slowing was noted in the right frontal central region.      CLINICAL CORRELATION:  The patient is a 77 year-old male who is being evaluated for witnessed seizures was currently maintained on Vimpat.  This is an abnormal EEG during wakefulness and drowsiness.  The presence of nearly continuous right frontal central focal slowing suggestive structural abnormality in this region.  There is no evidence of an epileptic process on this recording.  No seizures were recorded during this study.

## 2023-08-26 ENCOUNTER — PATIENT MESSAGE (OUTPATIENT)
Dept: ADMINISTRATIVE | Facility: HOSPITAL | Age: 78
End: 2023-08-26
Payer: MEDICARE

## 2023-09-18 ENCOUNTER — OFFICE VISIT (OUTPATIENT)
Dept: PAIN MEDICINE | Facility: CLINIC | Age: 78
End: 2023-09-18
Payer: MEDICARE

## 2023-09-18 VITALS
OXYGEN SATURATION: 100 % | HEART RATE: 57 BPM | HEIGHT: 74 IN | TEMPERATURE: 98 F | BODY MASS INDEX: 21.17 KG/M2 | RESPIRATION RATE: 18 BRPM | SYSTOLIC BLOOD PRESSURE: 131 MMHG | DIASTOLIC BLOOD PRESSURE: 69 MMHG

## 2023-09-18 DIAGNOSIS — M53.3 SACROILIAC JOINT PAIN: ICD-10-CM

## 2023-09-18 DIAGNOSIS — M43.06 SPONDYLOLYSIS OF LUMBAR REGION: ICD-10-CM

## 2023-09-18 DIAGNOSIS — M51.36 DDD (DEGENERATIVE DISC DISEASE), LUMBAR: Primary | ICD-10-CM

## 2023-09-18 PROCEDURE — 99213 OFFICE O/P EST LOW 20 MIN: CPT | Mod: PBBFAC | Performed by: STUDENT IN AN ORGANIZED HEALTH CARE EDUCATION/TRAINING PROGRAM

## 2023-09-18 PROCEDURE — 99204 PR OFFICE/OUTPT VISIT, NEW, LEVL IV, 45-59 MIN: ICD-10-PCS | Mod: S$PBB,,, | Performed by: STUDENT IN AN ORGANIZED HEALTH CARE EDUCATION/TRAINING PROGRAM

## 2023-09-18 PROCEDURE — 99999 PR PBB SHADOW E&M-EST. PATIENT-LVL III: ICD-10-PCS | Mod: PBBFAC,,, | Performed by: STUDENT IN AN ORGANIZED HEALTH CARE EDUCATION/TRAINING PROGRAM

## 2023-09-18 PROCEDURE — 99204 OFFICE O/P NEW MOD 45 MIN: CPT | Mod: S$PBB,,, | Performed by: STUDENT IN AN ORGANIZED HEALTH CARE EDUCATION/TRAINING PROGRAM

## 2023-09-18 PROCEDURE — 99999 PR PBB SHADOW E&M-EST. PATIENT-LVL III: CPT | Mod: PBBFAC,,, | Performed by: STUDENT IN AN ORGANIZED HEALTH CARE EDUCATION/TRAINING PROGRAM

## 2023-09-18 NOTE — H&P (VIEW-ONLY)
Chronic Pain - New Consult    Referring Physician: Ruma Frederick    Chief Complaint:   Chief Complaint   Patient presents with    Back Pain        SUBJECTIVE:    Kamar Muñoz presents to the clinic for the evaluation of back pain. The pain started couple years ago following no inciting event and symptoms have been worsening.The pain is located in the lower back (left side) area and radiates to the right side.  The pain is described as sharp and is rated as 8/10. The pain is rated with a score of  8/10 on the BEST day and a score of 10/10 on the WORST day.  He states that the kyphoplasty with Biro in February did help a considerable amount, however he feels his back pain is bad again.  Symptoms interfere with daily activity and sleep. The pain is exacerbated by Standing and Walking.  The pain is mitigated by laying down. He patient reports 2 hours of uninterrupted sleep per night.  He states he has an LSO brace, but felt it was too tight and not helping so he stopped wearing it.    Patient admits to incontinence for 8 months and left leg weakness due to stroke.    Physical Therapy/Home Exercise: yes, just finished a week or 2 weeks ago. He continues to do exercises at home, but only in the bed.      Pain Disability Index Review:      9/18/2023     9:23 AM 4/2/2018     9:41 AM 2/26/2018    10:32 AM   Last 3 PDI Scores   Pain Disability Index (PDI) 41 [phone] 45       Pain Medications:   - tylenol   - gabapentin     report:  Reviewed and consistent with medication use as prescribed.    Pain Procedures:   2/1/23 - Kyphoplasty at L1 (Biro)    Imaging:   MRI LUMBAR SPINE WITHOUT CONTRAST     CLINICAL HISTORY:  T12 compression fracture; Wedge compression fracture of T11-T12 vertebra, initial encounter for closed fracture     TECHNIQUE:  Multiplanar, multisequence MR images were acquired from the thoracolumbar junction to the sacrum without contrast.     COMPARISON:  10/24/2022     FINDINGS:  Alignment:  Straightening of lordosis.     Vertebrae: Compression fracture of the L1 vertebral body with bone marrow edema and moderate height loss.  There is mild depression of the T12 superior endplate without marrow edema, likely chronic.     Discs: There is severe disc height loss at L2-L3 and L3-L4 with associated fluid signal and sub endplate mild marrow edema.  There is severe disc height loss at L5-S1.     Cord: Conus terminates at L1 and appears unremarkable.  Cauda equina appears unremarkable.     Degenerative findings:     T12-L1: No spinal canal stenosis or neural foraminal narrowing.     L1-L2: No spinal canal stenosis or neural foraminal narrowing.     L2-L3: Circumferential disc bulge and moderate facet arthropathy result in moderate spinal canal stenosis and moderate bilateral neural foraminal narrowing.     L3-L4: Circumferential disc bulge and moderate facet arthropathy result in moderate spinal canal stenosis and moderate right, mild left neural foraminal narrowing.     L4-L5: Circumferential disc bulge and mild facet arthropathy.  No spinal canal stenosis or neural foraminal narrowing.     L5-S1: Fusion of facet joints, presumably postoperative.  No spinal canal stenosis or neural foraminal narrowing.     Paraspinal muscles & soft tissues: Moderate paraspinal muscle atrophy.  Multiple bilateral renal cysts.     Impression:     1. L1 compression fracture with moderate height loss, likely acute.  2. Likely chronic compression fracture of T12 vertebral body with mild height loss.  3. Severe disc height loss at L2-L3 and L3-L4 with fluid signal and sub endplate marrow edema.  Appearance may be seen with disc degeneration and less likely, early discitis.  Recommend clinical evaluation and correlation with inflammatory laboratory parameters.  4. Multilevel degenerative changes of the lumbar spine detailed above.  Moderate spinal canal stenosis and neural foraminal narrowing noted at L2-L4.        Electronically  signed by: Kameron Larson MD  Date:                                            12/17/2022  Time:                                           15:41    XR THORACIC SPINE LATERAL SUPINE AND LATERAL UPRIGHT     CLINICAL HISTORY:  eval tlso, t12 fracture;     TECHNIQUE:  Thoracic spine supine and semi erect lateral views.     COMPARISON:  CT lumbar spine 10/24/2022. CT thoracic spine 06/30/2022.     FINDINGS:  Previously described mild acute compression fracture superior endplate of T12 and mild to moderate acute on chronic compression fracture superior endplate of L1 appears similar to the prior day lumbar CT exam.  Remainder of the visualized lumbar spine also appears similar.  Remainder of the visualized thoracic spine appears similar to 06/30/2022 thoracic CT.     Impression:     As above.        Electronically signed by: Angel Luis Hugo MD  Date:                                            10/25/2022  Time:                                           02:40    CT LUMBAR SPINE WITHOUT CONTRAST     CLINICAL HISTORY:  Low back pain, trauma;     TECHNIQUE:  Low-dose axial, sagittal and coronal reformations are obtained through the lumbar spine.  Contrast was not administered.     COMPARISON:  10/05/2022     FINDINGS:  Mild acute compression fracture superior endplate of T12 is new from the prior study.     Mild-moderate acute on chronic compression fracture superior endplate of L1.     No comminution or retropulsion of these fractures.     Stable mild chronic compression fracture of the anterior superior endplate of L5 with anterior wedging.     Severe disc space narrowing at L5-S1, L3-4 to a slightly lesser degree L2-3.  Endplate degenerative changes are most prominent at L2-3.     L1-2: No significant disc bulges.  No significant central canal or foraminal narrowing.     L2-3: Mild to moderate posterior diffuse disc osteophyte complex.  Moderate central canal narrowing.  Mild bilateral foraminal narrowing.     L3-4: Mild to  moderate posterior diffuse disc osteophyte complex.  Ligamentum flavum hypertrophy with mild facet arthropathy.  Severe central canal narrowing.  Mild bilateral foraminal narrowing.     L4-5: Mild diffuse posterior disc osteophyte complex.  Moderate to severe facet arthropathy moderate central canal narrowing.  Mild bilateral foraminal narrowing.     L5-S1: Minimal posterior osteophytic spurring.  Mild bilateral foraminal narrowing.  Central canal is adequately maintained.  Moderate bilateral facet arthropathy.     Diverticulosis of the colon is incidentally noted.  Retained feces in the colon and rectum also.     Impression:     1. Mild acute compression fracture of the superior endplate of T12.  See above comments.  2. Mild-moderate acute on chronic compression fracture of the superior endplate of L1 also.  3. Stable mild chronic compression fracture of the anterior superior endplate of L5 with anterior wedging.  4. Multilevel chronic degenerative changes.        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    Hyperlipidemia     Hyperosmolar hyperglycemic state (HHS) 1/29/2022    Hypertension     Kidney stone     Migraine headache     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 08/02/2012    Paroxysmal atrial fibrillation     Pressure sore     STEMI involving right coronary artery 01/09/2022    Type II or unspecified type diabetes mellitus with neurological  manifestations, uncontrolled(250.62) 2013    Urinary tract infection     UTI (urinary tract infection) 2023     Past Surgical History:   Procedure Laterality Date    BACK SURGERY      CATARACT EXTRACTION W/  INTRAOCULAR LENS IMPLANT Right     Per Dr Romero note 2018    COLONOSCOPY N/A 2019    Procedure: COLONOSCOPY Suprep;  Surgeon: Anh Johnson MD;  Location: [facility] ENDO;  Service: Endoscopy;  Laterality: N/A;    ESOPHAGOGASTRODUODENOSCOPY N/A 9/15/2022    Procedure: EGD (ESOPHAGOGASTRODUODENOSCOPY);  Surgeon: Dashawn Evans MD;  Location: [facility] ENDO;  Service: Endoscopy;  Laterality: N/A;    EYE SURGERY      HERNIA REPAIR      KYPHOPLASTY, SPINE, LUMBAR N/A 2023    Procedure: KYPHOPLASTY, SPINE, LUMBAR;  Surgeon: Kimberly Spicer MD;  Location: [facility] OR;  Service: Neurosurgery;  Laterality: N/A;  Ane: Gen  Blood: Type & Hold  Pos: Prone  Rad: C-arm x 2  Spec Equip: Depuy Synthes - Tray Cortez    LEFT HEART CATHETERIZATION Left 2022    Procedure: CATHETERIZATION, HEART, LEFT;  Surgeon: Will Hurst III, MD;  Location: [facility] CATH LAB/EP;  Service: Cardiology;  Laterality: Left;    renal stones      SHOULDER OPEN ROTATOR CUFF REPAIR       Social History     Socioeconomic History    Marital status:    Tobacco Use    Smoking status: Former     Current packs/day: 0.00     Average packs/day: 1.5 packs/day for 25.0 years (37.5 ttl pk-yrs)     Types: Cigarettes     Start date: 1958     Quit date: 1983     Years since quittin.7    Smokeless tobacco: Never   Substance and Sexual Activity    Alcohol use: No    Drug use: No    Sexual activity: Yes     Partners: Female   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     Social Determinants of Health     Financial Resource Strain: Low Risk  (3/23/2023)    Overall Financial Resource Strain (CARDIA)     Difficulty of Paying Living Expenses: Not hard at all   Food  Insecurity: No Food Insecurity (1/14/2022)    Hunger Vital Sign     Worried About Running Out of Food in the Last Year: Never true     Ran Out of Food in the Last Year: Never true   Transportation Needs: Unknown (3/23/2023)    PRAPARE - Transportation     Lack of Transportation (Medical): No     Lack of Transportation (Non-Medical): Patient refused   Physical Activity: Unknown (3/23/2023)    Exercise Vital Sign     Days of Exercise per Week: Patient refused     Minutes of Exercise per Session: Patient refused   Social Connections: Unknown (3/23/2023)    Social Connection and Isolation Panel [NHANES]     Marital Status:    Housing Stability: High Risk (3/23/2023)    Housing Stability Vital Sign     Unable to Pay for Housing in the Last Year: Yes     Number of Places Lived in the Last Year: 1     Unstable Housing in the Last Year: No     Family History   Problem Relation Age of Onset    Diabetes Father     Prostate cancer Neg Hx     Kidney disease Neg Hx        Review of patient's allergies indicates:   Allergen Reactions    Iodine      Other reaction(s): swelling  Other reaction(s): Itching  Other reaction(s): Rash / IVP       Current Outpatient Medications   Medication Sig    acetaminophen (TYLENOL) 500 MG tablet Take 2 tablets (1,000 mg total) by mouth every 8 (eight) hours.    amiodarone (PACERONE) 200 MG Tab Take 1 tablet (200 mg total) by mouth once daily.    blood sugar diagnostic Strp 1 strip by Misc.(Non-Drug; Combo Route) route 2 (two) times a day.    ELIQUIS 5 mg Tab Take 5 mg by mouth 2 (two) times daily.    gabapentin (NEURONTIN) 100 MG capsule Take 1 capsule (100 mg total) by mouth 2 (two) times daily.    insulin aspart U-100 (NOVOLOG) 100 unit/mL (3 mL) InPn pen Inject 8 Units into the skin 3 (three) times daily with meals.    lancets (ONETOUCH ULTRASOFT LANCETS) Misc 1 lancet by Misc.(Non-Drug; Combo Route) route 2 (two) times a day.    LANTUS SOLOSTAR U-100 INSULIN glargine 100 units/mL SubQ  "pen Inject into the skin.    losartan (COZAAR) 25 MG tablet Take 25 mg by mouth once daily.    metFORMIN (GLUCOPHAGE) 500 MG tablet Take 500 mg by mouth 2 (two) times daily.    metoprolol succinate (TOPROL-XL) 25 MG 24 hr tablet Take 25 mg by mouth once daily.    ondansetron (ZOFRAN) 4 MG tablet Take 4 mg by mouth every 8 (eight) hours as needed.    pen needle, diabetic (PEN NEEDLE) 30 gauge x 5/16" Ndle 1 Units by Misc.(Non-Drug; Combo Route) route 3 (three) times daily.    polyethylene glycol (GLYCOLAX) 17 gram/dose powder Use cap to measure out (17 g) mix with a liquid and take by mouth once daily.    SITagliptin phosphate (JANUVIA) 50 MG Tab Take 1 tablet (50 mg total) by mouth once daily.    sucralfate (CARAFATE) 1 gram tablet Take 1 g by mouth 3 (three) times daily.    tamsulosin (FLOMAX) 0.4 mg Cap Take 1 capsule by mouth once daily.    vitamin D (VITAMIN D3) 1000 units Tab Take 1,000 Units by mouth once daily.    atorvastatin (LIPITOR) 40 MG tablet Take 1 tablet (40 mg total) by mouth once daily.    glucose 4 GM chewable tablet Take 6 tablets (24 g total) by mouth as needed for Low blood sugar (< 50).    insulin detemir U-100, Levemir, 100 unit/mL (3 mL) SubQ InPn pen Inject 14 Units into the skin once daily.    nitroGLYCERIN (NITROSTAT) 0.4 MG SL tablet Place 1 tablet (0.4 mg total) under the tongue every 5 (five) minutes as needed for Chest pain.    pantoprazole (PROTONIX) 40 MG tablet Take 1 tablet (40 mg total) by mouth once daily.     No current facility-administered medications for this visit.       REVIEW OF SYSTEMS:    GENERAL:  No weight loss, malaise or fevers.  HEENT:  Negative for frequent or significant headaches.  NECK:  Negative for lumps, goiter, pain and significant neck swelling.  RESPIRATORY:  Negative for cough, wheezing or shortness of breath.  CARDIOVASCULAR:  Negative for chest pain, leg swelling or palpitations.  GI:  Negative for abdominal discomfort, blood in stools or black stools " "or change in bowel habits.  MUSCULOSKELETAL:  See HPI.  SKIN:  Negative for lesions, rash, and itching.  PSYCH:  Negative for sleep disturbance, mood disorder and recent psychosocial stressors.  HEMATOLOGY/LYMPHOLOGY:  Negative for prolonged bleeding, bruising easily or swollen nodes. +Eliquis  NEURO:   No history of headaches, syncope, paralysis, seizures or tremors.  All other reviewed and negative other than HPI.    OBJECTIVE:    /69 (BP Location: Right arm, Patient Position: Sitting, BP Method: Small (Manual))   Pulse (!) 57   Temp 97.9 °F (36.6 °C) (Oral)   Resp 18   Ht 6' 2" (1.88 m)   SpO2 100%   BMI 21.17 kg/m²     PHYSICAL EXAMINATION:  General appearance: Well appearing, in no acute distress, alert and appropriately communicative.  Psych:  Mood and affect appropriate.  Skin: Skin color, texture, turgor normal, no rashes or lesions, in both upper and lower body.  Head/face:  Atraumatic, normocephalic.  Cor: regular rate  Pulm: non-labored breathing  GI: Abdomen non-distended and non-tender.  Back: Straight leg raising in the sitting and supine positions is negative to radicular pain. pain to palpation over the spine or paraspinal muscles. Pain with lumbar flexion and extension  Extremities: Peripheral joint ROM is full and pain free without obvious instability or laxity in all four extremities. No deformities, edema, or skin discoloration. Good capillary refill.  Musculoskeletal: hip, sacroiliac provocative maneuvers are negative with the exception of + MARIANNA on left > right, + Gaenslen on left, + Yi finger on left. Bilateral upper and lower extremity strength is normal and symmetric with the exception of left DF/EHL 3/5.  No atrophy or tone abnormalities are noted.  Neuro: Bilateral upper and lower extremity coordination and muscle stretch reflexes are physiologic and symmetric.  Negative Clonus. No loss of sensation is noted.  Gait: Wheelchair.    Lab Results   Component Value Date    " HGBA1C 8.9 (H) 02/28/2023       ASSESSMENT: 78 y.o. year old male with left back pain, consistent with:     1. DDD (degenerative disc disease), lumbar        2. Sacroiliac joint pain  Procedure Order to Pain Management      3. Spondylolysis of lumbar region            IMPRESSION: Mr. Muñoz is an 78 y.o gentleman who presents with chronic lower back pain.  He has had this pain for many years, however he sustained a fall earlier this year, which resulted in a compression fracture.  He is status post kyphoplasty with Dr. Spicer on February 1, 2023, with good relief of his acute pain.  However, he continues to have chronic lower back pain which he feels is getting worse.  He has completed 2 rounds of physical therapy, and he feels that his pain does get better with physical therapy.  He continues to do home exercises in his bed, however he does not try to get up to do his full physical therapy due to the pain.  He lives at a nursing home, and he is compliant with his medications, as this is managed by the nursing facility. He has not had any other injections in the past for his lower back pain, however he expresses that he doesn't like needles.    PLAN:   - I have stressed the importance of physical activity and a home exercise plan to help with pain and improve health.  - Patient can continue with medications for now since they are providing benefits, using them appropriately, and without side effects.  - schedule for left SI joint injection  - he will discuss this with his primary care provider at his nursing home to optimize his blood sugar before/after his injection, as he is a poorly controlled diabetic.  - alternatively, based on prior imaging, his pain could be originating from his lumbar facet arthropathy.  We can consider lumbar MBBs/RFA if his pain persists despite the SI joint injection.  - RTC after injection  - Counseled patient regarding the importance of activity modification and physical  therapy.    The above plan and management options were discussed at length with patient. Patient is in agreement with the above and verbalized understanding. It will be communicated with the referring physician via electronic record, fax, or mail.    Lucas Ramirez  09/18/2023

## 2023-09-18 NOTE — PROGRESS NOTES
Chronic Pain - New Consult    Referring Physician: Ruma Frederick    Chief Complaint:   Chief Complaint   Patient presents with    Back Pain        SUBJECTIVE:    Kamar Muñoz presents to the clinic for the evaluation of back pain. The pain started couple years ago following no inciting event and symptoms have been worsening.The pain is located in the lower back (left side) area and radiates to the right side.  The pain is described as sharp and is rated as 8/10. The pain is rated with a score of  8/10 on the BEST day and a score of 10/10 on the WORST day.  He states that the kyphoplasty with Biro in February did help a considerable amount, however he feels his back pain is bad again.  Symptoms interfere with daily activity and sleep. The pain is exacerbated by Standing and Walking.  The pain is mitigated by laying down. He patient reports 2 hours of uninterrupted sleep per night.  He states he has an LSO brace, but felt it was too tight and not helping so he stopped wearing it.    Patient admits to incontinence for 8 months and left leg weakness due to stroke.    Physical Therapy/Home Exercise: yes, just finished a week or 2 weeks ago. He continues to do exercises at home, but only in the bed.      Pain Disability Index Review:      9/18/2023     9:23 AM 4/2/2018     9:41 AM 2/26/2018    10:32 AM   Last 3 PDI Scores   Pain Disability Index (PDI) 41 [phone] 45       Pain Medications:   - tylenol   - gabapentin     report:  Reviewed and consistent with medication use as prescribed.    Pain Procedures:   2/1/23 - Kyphoplasty at L1 (Biro)    Imaging:   MRI LUMBAR SPINE WITHOUT CONTRAST     CLINICAL HISTORY:  T12 compression fracture; Wedge compression fracture of T11-T12 vertebra, initial encounter for closed fracture     TECHNIQUE:  Multiplanar, multisequence MR images were acquired from the thoracolumbar junction to the sacrum without contrast.     COMPARISON:  10/24/2022     FINDINGS:  Alignment:  Straightening of lordosis.     Vertebrae: Compression fracture of the L1 vertebral body with bone marrow edema and moderate height loss.  There is mild depression of the T12 superior endplate without marrow edema, likely chronic.     Discs: There is severe disc height loss at L2-L3 and L3-L4 with associated fluid signal and sub endplate mild marrow edema.  There is severe disc height loss at L5-S1.     Cord: Conus terminates at L1 and appears unremarkable.  Cauda equina appears unremarkable.     Degenerative findings:     T12-L1: No spinal canal stenosis or neural foraminal narrowing.     L1-L2: No spinal canal stenosis or neural foraminal narrowing.     L2-L3: Circumferential disc bulge and moderate facet arthropathy result in moderate spinal canal stenosis and moderate bilateral neural foraminal narrowing.     L3-L4: Circumferential disc bulge and moderate facet arthropathy result in moderate spinal canal stenosis and moderate right, mild left neural foraminal narrowing.     L4-L5: Circumferential disc bulge and mild facet arthropathy.  No spinal canal stenosis or neural foraminal narrowing.     L5-S1: Fusion of facet joints, presumably postoperative.  No spinal canal stenosis or neural foraminal narrowing.     Paraspinal muscles & soft tissues: Moderate paraspinal muscle atrophy.  Multiple bilateral renal cysts.     Impression:     1. L1 compression fracture with moderate height loss, likely acute.  2. Likely chronic compression fracture of T12 vertebral body with mild height loss.  3. Severe disc height loss at L2-L3 and L3-L4 with fluid signal and sub endplate marrow edema.  Appearance may be seen with disc degeneration and less likely, early discitis.  Recommend clinical evaluation and correlation with inflammatory laboratory parameters.  4. Multilevel degenerative changes of the lumbar spine detailed above.  Moderate spinal canal stenosis and neural foraminal narrowing noted at L2-L4.        Electronically  signed by: Kameron Larson MD  Date:                                            12/17/2022  Time:                                           15:41    XR THORACIC SPINE LATERAL SUPINE AND LATERAL UPRIGHT     CLINICAL HISTORY:  eval tlso, t12 fracture;     TECHNIQUE:  Thoracic spine supine and semi erect lateral views.     COMPARISON:  CT lumbar spine 10/24/2022. CT thoracic spine 06/30/2022.     FINDINGS:  Previously described mild acute compression fracture superior endplate of T12 and mild to moderate acute on chronic compression fracture superior endplate of L1 appears similar to the prior day lumbar CT exam.  Remainder of the visualized lumbar spine also appears similar.  Remainder of the visualized thoracic spine appears similar to 06/30/2022 thoracic CT.     Impression:     As above.        Electronically signed by: Angel Luis Hugo MD  Date:                                            10/25/2022  Time:                                           02:40    CT LUMBAR SPINE WITHOUT CONTRAST     CLINICAL HISTORY:  Low back pain, trauma;     TECHNIQUE:  Low-dose axial, sagittal and coronal reformations are obtained through the lumbar spine.  Contrast was not administered.     COMPARISON:  10/05/2022     FINDINGS:  Mild acute compression fracture superior endplate of T12 is new from the prior study.     Mild-moderate acute on chronic compression fracture superior endplate of L1.     No comminution or retropulsion of these fractures.     Stable mild chronic compression fracture of the anterior superior endplate of L5 with anterior wedging.     Severe disc space narrowing at L5-S1, L3-4 to a slightly lesser degree L2-3.  Endplate degenerative changes are most prominent at L2-3.     L1-2: No significant disc bulges.  No significant central canal or foraminal narrowing.     L2-3: Mild to moderate posterior diffuse disc osteophyte complex.  Moderate central canal narrowing.  Mild bilateral foraminal narrowing.     L3-4: Mild to  moderate posterior diffuse disc osteophyte complex.  Ligamentum flavum hypertrophy with mild facet arthropathy.  Severe central canal narrowing.  Mild bilateral foraminal narrowing.     L4-5: Mild diffuse posterior disc osteophyte complex.  Moderate to severe facet arthropathy moderate central canal narrowing.  Mild bilateral foraminal narrowing.     L5-S1: Minimal posterior osteophytic spurring.  Mild bilateral foraminal narrowing.  Central canal is adequately maintained.  Moderate bilateral facet arthropathy.     Diverticulosis of the colon is incidentally noted.  Retained feces in the colon and rectum also.     Impression:     1. Mild acute compression fracture of the superior endplate of T12.  See above comments.  2. Mild-moderate acute on chronic compression fracture of the superior endplate of L1 also.  3. Stable mild chronic compression fracture of the anterior superior endplate of L5 with anterior wedging.  4. Multilevel chronic degenerative changes.        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    Hyperlipidemia     Hyperosmolar hyperglycemic state (HHS) 1/29/2022    Hypertension     Kidney stone     Migraine headache     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 08/02/2012    Paroxysmal atrial fibrillation     Pressure sore     STEMI involving right coronary artery 01/09/2022    Type II or unspecified type diabetes mellitus with neurological  manifestations, uncontrolled(250.62) 2013    Urinary tract infection     UTI (urinary tract infection) 2023     Past Surgical History:   Procedure Laterality Date    BACK SURGERY      CATARACT EXTRACTION W/  INTRAOCULAR LENS IMPLANT Right     Per Dr Romero note 2018    COLONOSCOPY N/A 2019    Procedure: COLONOSCOPY Suprep;  Surgeon: Anh Johnson MD;  Location: [facility] ENDO;  Service: Endoscopy;  Laterality: N/A;    ESOPHAGOGASTRODUODENOSCOPY N/A 9/15/2022    Procedure: EGD (ESOPHAGOGASTRODUODENOSCOPY);  Surgeon: Dashawn Evans MD;  Location: [facility] ENDO;  Service: Endoscopy;  Laterality: N/A;    EYE SURGERY      HERNIA REPAIR      KYPHOPLASTY, SPINE, LUMBAR N/A 2023    Procedure: KYPHOPLASTY, SPINE, LUMBAR;  Surgeon: Kimberly Spicer MD;  Location: [facility] OR;  Service: Neurosurgery;  Laterality: N/A;  Ane: Gen  Blood: Type & Hold  Pos: Prone  Rad: C-arm x 2  Spec Equip: Depuy Synthes - Tray Cortez    LEFT HEART CATHETERIZATION Left 2022    Procedure: CATHETERIZATION, HEART, LEFT;  Surgeon: Will Hurst III, MD;  Location: [facility] CATH LAB/EP;  Service: Cardiology;  Laterality: Left;    renal stones      SHOULDER OPEN ROTATOR CUFF REPAIR       Social History     Socioeconomic History    Marital status:    Tobacco Use    Smoking status: Former     Current packs/day: 0.00     Average packs/day: 1.5 packs/day for 25.0 years (37.5 ttl pk-yrs)     Types: Cigarettes     Start date: 1958     Quit date: 1983     Years since quittin.7    Smokeless tobacco: Never   Substance and Sexual Activity    Alcohol use: No    Drug use: No    Sexual activity: Yes     Partners: Female   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     Social Determinants of Health     Financial Resource Strain: Low Risk  (3/23/2023)    Overall Financial Resource Strain (CARDIA)     Difficulty of Paying Living Expenses: Not hard at all   Food  Insecurity: No Food Insecurity (1/14/2022)    Hunger Vital Sign     Worried About Running Out of Food in the Last Year: Never true     Ran Out of Food in the Last Year: Never true   Transportation Needs: Unknown (3/23/2023)    PRAPARE - Transportation     Lack of Transportation (Medical): No     Lack of Transportation (Non-Medical): Patient refused   Physical Activity: Unknown (3/23/2023)    Exercise Vital Sign     Days of Exercise per Week: Patient refused     Minutes of Exercise per Session: Patient refused   Social Connections: Unknown (3/23/2023)    Social Connection and Isolation Panel [NHANES]     Marital Status:    Housing Stability: High Risk (3/23/2023)    Housing Stability Vital Sign     Unable to Pay for Housing in the Last Year: Yes     Number of Places Lived in the Last Year: 1     Unstable Housing in the Last Year: No     Family History   Problem Relation Age of Onset    Diabetes Father     Prostate cancer Neg Hx     Kidney disease Neg Hx        Review of patient's allergies indicates:   Allergen Reactions    Iodine      Other reaction(s): swelling  Other reaction(s): Itching  Other reaction(s): Rash / IVP       Current Outpatient Medications   Medication Sig    acetaminophen (TYLENOL) 500 MG tablet Take 2 tablets (1,000 mg total) by mouth every 8 (eight) hours.    amiodarone (PACERONE) 200 MG Tab Take 1 tablet (200 mg total) by mouth once daily.    blood sugar diagnostic Strp 1 strip by Misc.(Non-Drug; Combo Route) route 2 (two) times a day.    ELIQUIS 5 mg Tab Take 5 mg by mouth 2 (two) times daily.    gabapentin (NEURONTIN) 100 MG capsule Take 1 capsule (100 mg total) by mouth 2 (two) times daily.    insulin aspart U-100 (NOVOLOG) 100 unit/mL (3 mL) InPn pen Inject 8 Units into the skin 3 (three) times daily with meals.    lancets (ONETOUCH ULTRASOFT LANCETS) Misc 1 lancet by Misc.(Non-Drug; Combo Route) route 2 (two) times a day.    LANTUS SOLOSTAR U-100 INSULIN glargine 100 units/mL SubQ  "pen Inject into the skin.    losartan (COZAAR) 25 MG tablet Take 25 mg by mouth once daily.    metFORMIN (GLUCOPHAGE) 500 MG tablet Take 500 mg by mouth 2 (two) times daily.    metoprolol succinate (TOPROL-XL) 25 MG 24 hr tablet Take 25 mg by mouth once daily.    ondansetron (ZOFRAN) 4 MG tablet Take 4 mg by mouth every 8 (eight) hours as needed.    pen needle, diabetic (PEN NEEDLE) 30 gauge x 5/16" Ndle 1 Units by Misc.(Non-Drug; Combo Route) route 3 (three) times daily.    polyethylene glycol (GLYCOLAX) 17 gram/dose powder Use cap to measure out (17 g) mix with a liquid and take by mouth once daily.    SITagliptin phosphate (JANUVIA) 50 MG Tab Take 1 tablet (50 mg total) by mouth once daily.    sucralfate (CARAFATE) 1 gram tablet Take 1 g by mouth 3 (three) times daily.    tamsulosin (FLOMAX) 0.4 mg Cap Take 1 capsule by mouth once daily.    vitamin D (VITAMIN D3) 1000 units Tab Take 1,000 Units by mouth once daily.    atorvastatin (LIPITOR) 40 MG tablet Take 1 tablet (40 mg total) by mouth once daily.    glucose 4 GM chewable tablet Take 6 tablets (24 g total) by mouth as needed for Low blood sugar (< 50).    insulin detemir U-100, Levemir, 100 unit/mL (3 mL) SubQ InPn pen Inject 14 Units into the skin once daily.    nitroGLYCERIN (NITROSTAT) 0.4 MG SL tablet Place 1 tablet (0.4 mg total) under the tongue every 5 (five) minutes as needed for Chest pain.    pantoprazole (PROTONIX) 40 MG tablet Take 1 tablet (40 mg total) by mouth once daily.     No current facility-administered medications for this visit.       REVIEW OF SYSTEMS:    GENERAL:  No weight loss, malaise or fevers.  HEENT:  Negative for frequent or significant headaches.  NECK:  Negative for lumps, goiter, pain and significant neck swelling.  RESPIRATORY:  Negative for cough, wheezing or shortness of breath.  CARDIOVASCULAR:  Negative for chest pain, leg swelling or palpitations.  GI:  Negative for abdominal discomfort, blood in stools or black stools " "or change in bowel habits.  MUSCULOSKELETAL:  See HPI.  SKIN:  Negative for lesions, rash, and itching.  PSYCH:  Negative for sleep disturbance, mood disorder and recent psychosocial stressors.  HEMATOLOGY/LYMPHOLOGY:  Negative for prolonged bleeding, bruising easily or swollen nodes. +Eliquis  NEURO:   No history of headaches, syncope, paralysis, seizures or tremors.  All other reviewed and negative other than HPI.    OBJECTIVE:    /69 (BP Location: Right arm, Patient Position: Sitting, BP Method: Small (Manual))   Pulse (!) 57   Temp 97.9 °F (36.6 °C) (Oral)   Resp 18   Ht 6' 2" (1.88 m)   SpO2 100%   BMI 21.17 kg/m²     PHYSICAL EXAMINATION:  General appearance: Well appearing, in no acute distress, alert and appropriately communicative.  Psych:  Mood and affect appropriate.  Skin: Skin color, texture, turgor normal, no rashes or lesions, in both upper and lower body.  Head/face:  Atraumatic, normocephalic.  Cor: regular rate  Pulm: non-labored breathing  GI: Abdomen non-distended and non-tender.  Back: Straight leg raising in the sitting and supine positions is negative to radicular pain. pain to palpation over the spine or paraspinal muscles. Pain with lumbar flexion and extension  Extremities: Peripheral joint ROM is full and pain free without obvious instability or laxity in all four extremities. No deformities, edema, or skin discoloration. Good capillary refill.  Musculoskeletal: hip, sacroiliac provocative maneuvers are negative with the exception of + MARIANNA on left > right, + Gaenslen on left, + Yi finger on left. Bilateral upper and lower extremity strength is normal and symmetric with the exception of left DF/EHL 3/5.  No atrophy or tone abnormalities are noted.  Neuro: Bilateral upper and lower extremity coordination and muscle stretch reflexes are physiologic and symmetric.  Negative Clonus. No loss of sensation is noted.  Gait: Wheelchair.    Lab Results   Component Value Date    " HGBA1C 8.9 (H) 02/28/2023       ASSESSMENT: 78 y.o. year old male with left back pain, consistent with:     1. DDD (degenerative disc disease), lumbar        2. Sacroiliac joint pain  Procedure Order to Pain Management      3. Spondylolysis of lumbar region            IMPRESSION: Mr. Muñoz is an 78 y.o gentleman who presents with chronic lower back pain.  He has had this pain for many years, however he sustained a fall earlier this year, which resulted in a compression fracture.  He is status post kyphoplasty with Dr. Spicer on February 1, 2023, with good relief of his acute pain.  However, he continues to have chronic lower back pain which he feels is getting worse.  He has completed 2 rounds of physical therapy, and he feels that his pain does get better with physical therapy.  He continues to do home exercises in his bed, however he does not try to get up to do his full physical therapy due to the pain.  He lives at a nursing home, and he is compliant with his medications, as this is managed by the nursing facility. He has not had any other injections in the past for his lower back pain, however he expresses that he doesn't like needles.    PLAN:   - I have stressed the importance of physical activity and a home exercise plan to help with pain and improve health.  - Patient can continue with medications for now since they are providing benefits, using them appropriately, and without side effects.  - schedule for left SI joint injection  - he will discuss this with his primary care provider at his nursing home to optimize his blood sugar before/after his injection, as he is a poorly controlled diabetic.  - alternatively, based on prior imaging, his pain could be originating from his lumbar facet arthropathy.  We can consider lumbar MBBs/RFA if his pain persists despite the SI joint injection.  - RTC after injection  - Counseled patient regarding the importance of activity modification and physical  therapy.    The above plan and management options were discussed at length with patient. Patient is in agreement with the above and verbalized understanding. It will be communicated with the referring physician via electronic record, fax, or mail.    Lucas Ramirez  09/18/2023

## 2023-09-19 ENCOUNTER — PATIENT MESSAGE (OUTPATIENT)
Dept: PAIN MEDICINE | Facility: OTHER | Age: 78
End: 2023-09-19
Payer: MEDICARE

## 2023-09-28 ENCOUNTER — HOSPITAL ENCOUNTER (OUTPATIENT)
Facility: OTHER | Age: 78
Discharge: HOME | End: 2023-09-28
Attending: STUDENT IN AN ORGANIZED HEALTH CARE EDUCATION/TRAINING PROGRAM | Admitting: STUDENT IN AN ORGANIZED HEALTH CARE EDUCATION/TRAINING PROGRAM
Payer: MEDICARE

## 2023-09-28 VITALS
SYSTOLIC BLOOD PRESSURE: 145 MMHG | BODY MASS INDEX: 19.25 KG/M2 | HEART RATE: 67 BPM | WEIGHT: 150 LBS | HEIGHT: 74 IN | TEMPERATURE: 97 F | RESPIRATION RATE: 16 BRPM | DIASTOLIC BLOOD PRESSURE: 70 MMHG | OXYGEN SATURATION: 97 %

## 2023-09-28 DIAGNOSIS — M46.1 SACROILIITIS: Primary | ICD-10-CM

## 2023-09-28 DIAGNOSIS — G89.29 CHRONIC PAIN: ICD-10-CM

## 2023-09-28 LAB
POCT GLUCOSE: 207 MG/DL (ref 70–110)
POCT GLUCOSE: 215 MG/DL (ref 70–110)

## 2023-09-28 PROCEDURE — 25000003 PHARM REV CODE 250: Performed by: STUDENT IN AN ORGANIZED HEALTH CARE EDUCATION/TRAINING PROGRAM

## 2023-09-28 PROCEDURE — 27096 INJECT SACROILIAC JOINT: CPT | Mod: LT,,, | Performed by: STUDENT IN AN ORGANIZED HEALTH CARE EDUCATION/TRAINING PROGRAM

## 2023-09-28 PROCEDURE — 25500020 PHARM REV CODE 255: Performed by: STUDENT IN AN ORGANIZED HEALTH CARE EDUCATION/TRAINING PROGRAM

## 2023-09-28 PROCEDURE — 27096 INJECT SACROILIAC JOINT: CPT | Mod: LT | Performed by: STUDENT IN AN ORGANIZED HEALTH CARE EDUCATION/TRAINING PROGRAM

## 2023-09-28 PROCEDURE — 27096 PR INJECTION,SACROILIAC JOINT: ICD-10-PCS | Mod: LT,,, | Performed by: STUDENT IN AN ORGANIZED HEALTH CARE EDUCATION/TRAINING PROGRAM

## 2023-09-28 PROCEDURE — 63600175 PHARM REV CODE 636 W HCPCS: Performed by: STUDENT IN AN ORGANIZED HEALTH CARE EDUCATION/TRAINING PROGRAM

## 2023-09-28 RX ORDER — BUPIVACAINE HYDROCHLORIDE 2.5 MG/ML
INJECTION, SOLUTION EPIDURAL; INFILTRATION; INTRACAUDAL
Status: DISCONTINUED | OUTPATIENT
Start: 2023-09-28 | End: 2023-09-28 | Stop reason: HOSPADM

## 2023-09-28 RX ORDER — LIDOCAINE HYDROCHLORIDE 20 MG/ML
INJECTION, SOLUTION INFILTRATION; PERINEURAL
Status: DISCONTINUED | OUTPATIENT
Start: 2023-09-28 | End: 2023-09-28 | Stop reason: HOSPADM

## 2023-09-28 RX ORDER — TRIAMCINOLONE ACETONIDE 40 MG/ML
INJECTION, SUSPENSION INTRA-ARTICULAR; INTRAMUSCULAR
Status: DISCONTINUED | OUTPATIENT
Start: 2023-09-28 | End: 2023-09-28 | Stop reason: HOSPADM

## 2023-09-28 RX ORDER — ALPRAZOLAM 0.5 MG/1
0.5 TABLET ORAL ONCE AS NEEDED
Status: COMPLETED | OUTPATIENT
Start: 2023-09-28 | End: 2023-09-28

## 2023-09-28 RX ADMIN — ALPRAZOLAM 0.5 MG: 0.5 TABLET ORAL at 09:09

## 2023-09-28 NOTE — OP NOTE
Sacroiliac Joint Injection under Fluoroscopic Guidance    The procedure, risks, benefits, and options were discussed with the patient. There are no contraindications to the procedure. The patent expressed understanding and agreed to the procedure. Informed written consent was obtained prior to the start of the procedure and can be found in the patient's chart.    PATIENT NAME: Kamar Muñoz   MRN: [id number]     DATE OF PROCEDURE: 09/28/2023    PROCEDURE: Left Sacroiliac Joint Injection under Fluoroscopic Guidance    PRE-OP DIAGNOSIS: Sacroiliac joint pain [M53.3]    POST-OP DIAGNOSIS: Same    PHYSICIAN: Lucas Ramirez MD    ASSISTANTS: None     MEDICATIONS INJECTED: Preservative-free Kenalog 40mg with 3cc of Bupivacine 0.25%     LOCAL ANESTHETIC INJECTED: Xylocaine 2%     SEDATION: None    ESTIMATED BLOOD LOSS: None    COMPLICATIONS: None    TECHNIQUE: Time-out was performed to identify the patient and procedure to be performed. With the patient laying in a prone position, the surgical area was prepped and draped in the usual sterile fashion using ChloraPrep and a fenestrated drape. The sacroiliac joint was determined under fluoroscopy guidance. Skin anesthesia was achieved by injecting Lidocaine 2% over the injection site. The sacroiliac joint was  then approached with a 25 gauge, 3.5 inch spinal quinke needle that was introduced under fluoroscopic guidance in the AP and Lateral views. Once the needle tip was in the area of the joint, and there was no blood, contrast dye Omnipaque (240mg/mL) was injected to confirm placement and there was no vascular runoff. Fluoroscopic imaging in the AP and lateral views revealed a clear outline of the joint space. 4 mL of the medication mixture listed above was injected slowly intraarticular and serina-articular. Displacement of the radio opaque contrast after injection of the medication confirmed that the medication went into the area of the joint. The needles were removed and  bleeding was nil.  A sterile dressing was applied. No specimens collected. The patient tolerated the procedure well.     The patient was monitored after the procedure in the recovery area. They were given post-procedure and discharge instructions to follow at home. The patient was discharged in a stable condition.      Lucas Ramirez MD

## 2023-09-28 NOTE — DISCHARGE SUMMARY
Discharge Note  Short Stay      SUMMARY     Admit Date: 9/28/2023    Attending Physician: Lucas Ramirez MD PhD    Discharge Physician: Lucas Ramirez      Discharge Date: 9/28/2023 9:55 AM    Procedure(s) (LRB):  INJECTION,SACROILIAC JOINT, LEFT SI (Left)    Final Diagnosis: Sacroiliac joint pain [M53.3]    Disposition: Home or self care    Patient Instructions:   Current Discharge Medication List        CONTINUE these medications which have NOT CHANGED    Details   acetaminophen (TYLENOL) 500 MG tablet Take 2 tablets (1,000 mg total) by mouth every 8 (eight) hours.  Qty: 60 tablet, Refills: 0      amiodarone (PACERONE) 200 MG Tab Take 1 tablet (200 mg total) by mouth once daily.  Qty: 90 tablet, Refills: 3      atorvastatin (LIPITOR) 40 MG tablet Take 1 tablet (40 mg total) by mouth once daily.  Qty: 90 tablet, Refills: 3      blood sugar diagnostic Strp 1 strip by Misc.(Non-Drug; Combo Route) route 2 (two) times a day.  Qty: 200 strip, Refills: 6    Associated Diagnoses: Type 2 diabetes mellitus with diabetic peripheral angiopathy without gangrene, with long-term current use of insulin      ELIQUIS 5 mg Tab Take 5 mg by mouth 2 (two) times daily.      gabapentin (NEURONTIN) 100 MG capsule Take 1 capsule (100 mg total) by mouth 2 (two) times daily.  Qty: 60 capsule, Refills: 11      glucose 4 GM chewable tablet Take 6 tablets (24 g total) by mouth as needed for Low blood sugar (< 50).  Refills: 12      insulin aspart U-100 (NOVOLOG) 100 unit/mL (3 mL) InPn pen Inject 8 Units into the skin 3 (three) times daily with meals.  Qty: 86.4 mL, Refills: 0      insulin detemir U-100, Levemir, 100 unit/mL (3 mL) SubQ InPn pen Inject 14 Units into the skin once daily.  Qty: 4.2 mL, Refills: 0      lancets (ONETOUCH ULTRASOFT LANCETS) Misc 1 lancet by Misc.(Non-Drug; Combo Route) route 2 (two) times a day.  Qty: 200 each, Refills: 6    Associated Diagnoses: Type 2 diabetes mellitus with diabetic peripheral angiopathy without  "gangrene, with long-term current use of insulin      LANTUS SOLOSTAR U-100 INSULIN glargine 100 units/mL SubQ pen Inject into the skin.      losartan (COZAAR) 25 MG tablet Take 25 mg by mouth once daily.      metFORMIN (GLUCOPHAGE) 500 MG tablet Take 500 mg by mouth 2 (two) times daily.      metoprolol succinate (TOPROL-XL) 25 MG 24 hr tablet Take 25 mg by mouth once daily.      nitroGLYCERIN (NITROSTAT) 0.4 MG SL tablet Place 1 tablet (0.4 mg total) under the tongue every 5 (five) minutes as needed for Chest pain.  Qty: 25 tablet, Refills: 11      ondansetron (ZOFRAN) 4 MG tablet Take 4 mg by mouth every 8 (eight) hours as needed.      pantoprazole (PROTONIX) 40 MG tablet Take 1 tablet (40 mg total) by mouth once daily.  Qty: 30 tablet, Refills: 11      pen needle, diabetic (PEN NEEDLE) 30 gauge x 5/16" Ndle 1 Units by Misc.(Non-Drug; Combo Route) route 3 (three) times daily.  Qty: 100 each, Refills: 3    Associated Diagnoses: Type 2 diabetes mellitus with hyperglycemia, with long-term current use of insulin; Type 2 diabetes mellitus with stage 3 chronic kidney disease, with long-term current use of insulin      polyethylene glycol (GLYCOLAX) 17 gram/dose powder Use cap to measure out (17 g) mix with a liquid and take by mouth once daily.  Qty: 510 g, Refills: 2      SITagliptin phosphate (JANUVIA) 50 MG Tab Take 1 tablet (50 mg total) by mouth once daily.  Qty: 30 tablet, Refills: 2      sucralfate (CARAFATE) 1 gram tablet Take 1 g by mouth 3 (three) times daily.      tamsulosin (FLOMAX) 0.4 mg Cap Take 1 capsule by mouth once daily.      vitamin D (VITAMIN D3) 1000 units Tab Take 1,000 Units by mouth once daily.                 Discharge Diagnosis: Sacroiliac joint pain [M53.3]  Condition on Discharge: Stable with no complications to procedure   Diet on Discharge: Same as before.  Activity: as per instruction sheet.  Discharge to: Home with a responsible adult.  Follow up: 2-4 weeks       Please call my office or " pager at [phone] if experienced any weakness or loss of sensation, fever > 101.5, pain uncontrolled with oral medications, persistent nausea/vomiting/or diarrhea, redness or drainage from the incisions, or any other worrisome concerns. If physician on call was not reached or could not communicate with our office for any reason please go to the nearest emergency department      Lucas Ramirez MD PhD

## 2023-09-28 NOTE — INTERVAL H&P NOTE
The patient was examined and no significant changes were noted from the updated H&P or last clinic note.    The risks and benefits of this procedure, including alternative therapies, were discussed with the patient.  The discussion of risks included infection, bleeding, need for additional procedures or surgery, nerve damage, paralysis, adverse medication reaction(s), stroke, and if appropriate for the procedure, death.  Questions regarding the procedure, risks, expected outcome, and possible side effects were solicited and answered to Kamar's satisfaction.  Floydherberth Toni wishes to proceed with the injection or procedure as confirmed by written consent.    Blood sugar today, 207.  Last A1C 8.9    Ok to proceed, but recommend close monitoring of blood sugar.  Discussed with caregiver.

## 2023-09-28 NOTE — DISCHARGE INSTRUCTIONS

## 2023-10-01 NOTE — PLAN OF CARE
Daughter received postop medications and will bring them to nursing home. Daughter also verbalized understanding of discharge instructions.  GALDINO Joseph assisted patient downstairs via wheelchair accompanied by Renetta OLIVIER. [facility] transport to arrive shortly.     NP paramjit.

## 2023-11-28 ENCOUNTER — HOSPITAL ENCOUNTER (EMERGENCY)
Facility: HOSPITAL | Age: 78
Discharge: HOME | End: 2023-11-28
Attending: STUDENT IN AN ORGANIZED HEALTH CARE EDUCATION/TRAINING PROGRAM
Payer: MEDICARE

## 2023-11-28 VITALS
DIASTOLIC BLOOD PRESSURE: 80 MMHG | RESPIRATION RATE: 16 BRPM | HEART RATE: 68 BPM | OXYGEN SATURATION: 97 % | TEMPERATURE: 98 F | BODY MASS INDEX: 19.26 KG/M2 | WEIGHT: 150 LBS | SYSTOLIC BLOOD PRESSURE: 150 MMHG

## 2023-11-28 DIAGNOSIS — R31.0 GROSS HEMATURIA: ICD-10-CM

## 2023-11-28 DIAGNOSIS — N30.01 ACUTE CYSTITIS WITH HEMATURIA: Primary | ICD-10-CM

## 2023-11-28 LAB
ALBUMIN SERPL BCP-MCNC: 2.8 G/DL (ref 3.5–5.2)
ALP SERPL-CCNC: 131 U/L (ref 55–135)
ALT SERPL W/O P-5'-P-CCNC: 13 U/L (ref 10–44)
ANION GAP SERPL CALC-SCNC: 11 MMOL/L (ref 8–16)
AST SERPL-CCNC: 16 U/L (ref 10–40)
BACTERIA #/AREA URNS AUTO: ABNORMAL /HPF
BASOPHILS # BLD AUTO: 0.03 K/UL (ref 0–0.2)
BASOPHILS NFR BLD: 0.4 % (ref 0–1.9)
BILIRUB SERPL-MCNC: 0.5 MG/DL (ref 0.1–1)
BILIRUB UR QL STRIP: NEGATIVE
BUN SERPL-MCNC: 33 MG/DL (ref 8–23)
CALCIUM SERPL-MCNC: 8.8 MG/DL (ref 8.7–10.5)
CHLORIDE SERPL-SCNC: 105 MMOL/L (ref 95–110)
CLARITY UR REFRACT.AUTO: ABNORMAL
CO2 SERPL-SCNC: 23 MMOL/L (ref 23–29)
COLOR UR AUTO: ABNORMAL
CREAT SERPL-MCNC: 1.2 MG/DL (ref 0.5–1.4)
DIFFERENTIAL METHOD: ABNORMAL
EOSINOPHIL # BLD AUTO: 0.2 K/UL (ref 0–0.5)
EOSINOPHIL NFR BLD: 2.3 % (ref 0–8)
ERYTHROCYTE [DISTWIDTH] IN BLOOD BY AUTOMATED COUNT: 15.1 % (ref 11.5–14.5)
EST. GFR  (NO RACE VARIABLE): >60 ML/MIN/1.73 M^2
GLUCOSE SERPL-MCNC: 241 MG/DL (ref 70–110)
GLUCOSE UR QL STRIP: ABNORMAL
HCT VFR BLD AUTO: 37.6 % (ref 40–54)
HGB BLD-MCNC: 12.1 G/DL (ref 14–18)
HGB UR QL STRIP: ABNORMAL
HIV 1+2 AB+HIV1 P24 AG SERPL QL IA: NORMAL
HYALINE CASTS UR QL AUTO: 0 /LPF
IMM GRANULOCYTES # BLD AUTO: 0.03 K/UL (ref 0–0.04)
IMM GRANULOCYTES NFR BLD AUTO: 0.4 % (ref 0–0.5)
KETONES UR QL STRIP: ABNORMAL
LEUKOCYTE ESTERASE UR QL STRIP: ABNORMAL
LYMPHOCYTES # BLD AUTO: 1.3 K/UL (ref 1–4.8)
LYMPHOCYTES NFR BLD: 16.1 % (ref 18–48)
MCH RBC QN AUTO: 28 PG (ref 27–31)
MCHC RBC AUTO-ENTMCNC: 32.2 G/DL (ref 32–36)
MCV RBC AUTO: 87 FL (ref 82–98)
MICROSCOPIC COMMENT: ABNORMAL
MONOCYTES # BLD AUTO: 0.7 K/UL (ref 0.3–1)
MONOCYTES NFR BLD: 9.1 % (ref 4–15)
NEUTROPHILS # BLD AUTO: 5.6 K/UL (ref 1.8–7.7)
NEUTROPHILS NFR BLD: 71.7 % (ref 38–73)
NITRITE UR QL STRIP: NEGATIVE
NRBC BLD-RTO: 0 /100 WBC
PH UR STRIP: 5 [PH] (ref 5–8)
PLATELET # BLD AUTO: 217 K/UL (ref 150–450)
PMV BLD AUTO: 10.5 FL (ref 9.2–12.9)
POTASSIUM SERPL-SCNC: 3.9 MMOL/L (ref 3.5–5.1)
PROT SERPL-MCNC: 6.7 G/DL (ref 6–8.4)
PROT UR QL STRIP: ABNORMAL
RBC # BLD AUTO: 4.32 M/UL (ref 4.6–6.2)
RBC #/AREA URNS AUTO: >100 /HPF (ref 0–4)
SODIUM SERPL-SCNC: 139 MMOL/L (ref 136–145)
SP GR UR STRIP: 1.02 (ref 1–1.03)
URN SPEC COLLECT METH UR: ABNORMAL
WBC # BLD AUTO: 7.77 K/UL (ref 3.9–12.7)
WBC #/AREA URNS AUTO: >100 /HPF (ref 0–5)
YEAST UR QL AUTO: ABNORMAL

## 2023-11-28 PROCEDURE — 25000003 PHARM REV CODE 250: Performed by: PHYSICIAN ASSISTANT

## 2023-11-28 PROCEDURE — 87389 HIV-1 AG W/HIV-1&-2 AB AG IA: CPT | Performed by: PHYSICIAN ASSISTANT

## 2023-11-28 PROCEDURE — 85025 COMPLETE CBC W/AUTO DIFF WBC: CPT | Performed by: PHYSICIAN ASSISTANT

## 2023-11-28 PROCEDURE — 87086 URINE CULTURE/COLONY COUNT: CPT | Performed by: PHYSICIAN ASSISTANT

## 2023-11-28 PROCEDURE — 99284 EMERGENCY DEPT VISIT MOD MDM: CPT | Mod: 25

## 2023-11-28 PROCEDURE — 81001 URINALYSIS AUTO W/SCOPE: CPT | Performed by: PHYSICIAN ASSISTANT

## 2023-11-28 PROCEDURE — 96360 HYDRATION IV INFUSION INIT: CPT

## 2023-11-28 PROCEDURE — 80053 COMPREHEN METABOLIC PANEL: CPT | Performed by: PHYSICIAN ASSISTANT

## 2023-11-28 PROCEDURE — 63600175 PHARM REV CODE 636 W HCPCS: Performed by: PHYSICIAN ASSISTANT

## 2023-11-28 RX ORDER — CEPHALEXIN 500 MG/1
500 CAPSULE ORAL 4 TIMES DAILY
Qty: 28 CAPSULE | Refills: 0 | Status: SHIPPED | OUTPATIENT
Start: 2023-11-28 | End: 2023-11-28 | Stop reason: SDUPTHER

## 2023-11-28 RX ORDER — CEPHALEXIN 500 MG/1
CAPSULE ORAL
Status: DISCONTINUED
Start: 2023-11-28 | End: 2023-11-28 | Stop reason: HOSPADM

## 2023-11-28 RX ORDER — CEPHALEXIN 500 MG/1
500 CAPSULE ORAL 4 TIMES DAILY
Qty: 28 CAPSULE | Refills: 0 | Status: SHIPPED | OUTPATIENT
Start: 2023-11-28 | End: 2023-12-05

## 2023-11-28 RX ORDER — CEPHALEXIN 500 MG/1
500 CAPSULE ORAL
Status: COMPLETED | OUTPATIENT
Start: 2023-11-28 | End: 2023-11-28

## 2023-11-28 RX ADMIN — CEPHALEXIN 500 MG: 500 CAPSULE ORAL at 07:11

## 2023-11-28 RX ADMIN — SODIUM CHLORIDE, POTASSIUM CHLORIDE, SODIUM LACTATE AND CALCIUM CHLORIDE 500 ML: 600; 310; 30; 20 INJECTION, SOLUTION INTRAVENOUS at 05:11

## 2023-11-28 NOTE — ED PROVIDER NOTES
Encounter Date: 11/28/2023       History     Chief Complaint   Patient presents with    Hematuria     From [facility]. Hematuria X3 days     78-year-old male with multiple comorbidities including hypertension, diabetes, history of NSTEMI, paroxysmal AFib on Eliquis, neuropathy presents for hematuria for 2 days.  Reports that urine was dark and has gradually improved slightly over the past 2 days.  He is incontinent at baseline, he denies increased urinary frequency, urinary retention, dysuria, abdominal pain, fever or flank pain.  He is report history of kidney stones but states this does not feel similar.        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    Hyperlipidemia     Hyperosmolar hyperglycemic state (HHS) 1/29/2022    Hypertension     Kidney stone     Migraine headache     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 08/02/2012    Paroxysmal atrial fibrillation     Pressure sore     STEMI involving right coronary artery 01/09/2022    Type II or unspecified type diabetes mellitus with neurological manifestations, uncontrolled(250.62) 03/08/2013    Urinary tract infection     UTI (urinary tract infection) 2/28/2023     Past Surgical History:   Procedure Laterality Date    BACK SURGERY      CATARACT EXTRACTION W/  INTRAOCULAR LENS IMPLANT Right     Per Dr Romero note 11/2018    COLONOSCOPY N/A 1/28/2019    Procedure: COLONOSCOPY Suprep;   Surgeon: Anh Johnson MD;  Location: [facility] ENDO;  Service: Endoscopy;  Laterality: N/A;    ESOPHAGOGASTRODUODENOSCOPY N/A 9/15/2022    Procedure: EGD (ESOPHAGOGASTRODUODENOSCOPY);  Surgeon: Dashawn Evans MD;  Location: [facility] ENDO;  Service: Endoscopy;  Laterality: N/A;    EYE SURGERY      HERNIA REPAIR      INJECTION, SACROILIAC JOINT Left 2023    Procedure: INJECTION,SACROILIAC JOINT, LEFT SI;  Surgeon: Lucas Ramirez MD;  Location: [facility] PAIN MGT;  Service: Pain Management;  Laterality: Left;    KYPHOPLASTY, SPINE, LUMBAR N/A 2023    Procedure: KYPHOPLASTY, SPINE, LUMBAR;  Surgeon: Kimberly Spicer MD;  Location: [facility] OR;  Service: Neurosurgery;  Laterality: N/A;  Ane: Gen  Blood: Type & Hold  Pos: Prone  Rad: C-arm x 2  Spec Equip: Depuy Synthes - Tray Cortez    LEFT HEART CATHETERIZATION Left 2022    Procedure: CATHETERIZATION, HEART, LEFT;  Surgeon: Will Hurst III, MD;  Location: [facility] CATH LAB/EP;  Service: Cardiology;  Laterality: Left;    renal stones      SHOULDER OPEN ROTATOR CUFF REPAIR       Family History   Problem Relation Age of Onset    Diabetes Father     Prostate cancer Neg Hx     Kidney disease Neg Hx      Social History     Tobacco Use    Smoking status: Former     Current packs/day: 0.00     Average packs/day: 1.5 packs/day for 25.0 years (37.5 ttl pk-yrs)     Types: Cigarettes     Start date: 1958     Quit date: 1983     Years since quittin.9    Smokeless tobacco: Never   Substance Use Topics    Alcohol use: No    Drug use: No     Review of Systems    Physical Exam     Initial Vitals [23 1330]   BP Pulse Resp Temp SpO2   116/72 80 18 98 °F (36.7 °C) 98 %      MAP       --         Physical Exam    Nursing note and vitals reviewed.  Constitutional: He appears well-developed and well-nourished. He is not diaphoretic. No distress.   HENT:   Head: Normocephalic and atraumatic.   Cardiovascular:  Normal rate, regular rhythm, normal heart sounds  and intact distal pulses.     Exam reveals no gallop and no friction rub.       No murmur heard.  Pulmonary/Chest: Breath sounds normal. No respiratory distress. He has no wheezes. He has no rhonchi. He has no rales. He exhibits no tenderness.   Abdominal: Abdomen is soft. Bowel sounds are normal. He exhibits no distension and no mass. There is no abdominal tenderness. There is no rebound and no guarding.   Musculoskeletal:         General: Normal range of motion.     Neurological: He is alert and oriented to person, place, and time.   Skin: Skin is warm and dry.   Psychiatric: He has a normal mood and affect.         ED Course   Procedures  Labs Reviewed   CBC W/ AUTO DIFFERENTIAL - Abnormal; Notable for the following components:       Result Value    RBC 4.32 (*)     Hemoglobin 12.1 (*)     Hematocrit 37.6 (*)     RDW 15.1 (*)     Lymph % 16.1 (*)     All other components within normal limits   COMPREHENSIVE METABOLIC PANEL - Abnormal; Notable for the following components:    Glucose 241 (*)     BUN 33 (*)     Albumin 2.8 (*)     All other components within normal limits   URINALYSIS, REFLEX TO URINE CULTURE - Abnormal; Notable for the following components:    Color, UA Red (*)     Appearance, UA Cloudy (*)     Protein, UA 2+ (*)     Glucose, UA 3+ (*)     Ketones, UA Trace (*)     Occult Blood UA 3+ (*)     Leukocytes, UA 2+ (*)     All other components within normal limits    Narrative:     Specimen Source->Urine   URINALYSIS MICROSCOPIC - Abnormal; Notable for the following components:    RBC, UA >100 (*)     WBC, UA >100 (*)     All other components within normal limits    Narrative:     Specimen Source->Urine   CULTURE, URINE   HIV 1 / 2 ANTIBODY    Narrative:     Release to patient->Immediate          Imaging Results    None          Medications   lactated ringers bolus 500 mL (0 mLs Intravenous Stopped 11/28/23 1850)   cephALEXin capsule 500 mg (500 mg Oral Given 11/28/23 1959)     Medical Decision  Making  78-year-old anticoagulated male presenting for painless hematuria.  Mildly hypertensive at 153/70 with otherwise normal vitals.  Nontoxic appearing.    Differential diagnosis:  UTI   Coagulopathy   Not overtly septic   Blood loss anemia felt less likely   He is describing urine as dark, hyperbilirubinemia is a possibility  Dehydration    Will check labs and reassess.    Labs notable for mildly elevated BUN as well as WBCs and RBCs in urine.  Bilirubin is normal.  Will treat with antibiotics and refer to Urology for follow-up.  Patient counseled on signs of urinary retention and instructed to return to the ED for any worsening symptoms.  Stressed the importance of follow-up, strict ED return precautions given.  Patient voiced understanding and is comfortable with discharge. I discussed this patient with my supervising physician.      Amount and/or Complexity of Data Reviewed  Labs: ordered. Decision-making details documented in ED Course.    Risk  Prescription drug management.               ED Course as of 11/28/23 2036   Tue Nov 28, 2023   1706 Hemoglobin(!): 12.1 [CC]   1706 WBC: 7.77 [CC]   1720 Creatinine: 1.2 [CC]   1720 BUN(!): 33 [CC]   1720 BILIRUBIN TOTAL: 0.5 [CC]   1824 WBC, UA(!): >100 [CC]   1824 RBC, UA(!): >100 [CC]      ED Course User Index  [CC] Yolanda Cardona PA-C                           Clinical Impression:  Final diagnoses:  [N30.01] Acute cystitis with hematuria (Primary)  [R31.0] Gross hematuria          ED Disposition Condition    Discharge Stable          ED Prescriptions       Medication Sig Dispense Start Date End Date Auth. Provider    cephALEXin (KEFLEX) 500 MG capsule  (Status: Discontinued) Take 1 capsule (500 mg total) by mouth 4 (four) times daily. for 7 days 28 capsule 11/28/2023 11/28/2023 Yolanda Cardona PA-C    cephALEXin (KEFLEX) 500 MG capsule Take 1 capsule (500 mg total) by mouth 4 (four) times daily. for 7 days 28 capsule 11/28/2023 12/5/2023  Yolanda Cardona PA-C          Follow-up Information       Follow up With Specialties Details Why Contact Info Additional Information    [facility] [address] Urology Schedule an appointment as soon as possible for a visit in 1 week  [address]  [facility] [address] [phone] Main Building, 4th Floor Please park in [facility] and take [facility] elevator    [facility] - Emergency Dept Emergency Medicine Go to  If symptoms worsen [address]  [facility] [id number] [phone]              Yolanda Cardona PA-C  11/28/23 2036

## 2023-11-28 NOTE — ED TRIAGE NOTES
Kamar Muñoz, a 78 y.o. male presents to the ED w/ complaint of hematuria for past few days.  Patient states that he noticed dark red in his urine 3 days ago but it has since gotten lighter.  Patient denies any pain or burning while urinating but is incontinent.  Hx kidney stones    Triage note:  Chief Complaint   Patient presents with    Hematuria     From [facility]. 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

## 2023-11-29 ENCOUNTER — HOSPITAL ENCOUNTER (EMERGENCY)
Facility: HOSPITAL | Age: 78
End: 2023-11-29
Attending: EMERGENCY MEDICINE
Payer: MEDICARE

## 2023-11-29 VITALS
WEIGHT: 180 LBS | BODY MASS INDEX: 23.1 KG/M2 | HEIGHT: 74 IN | RESPIRATION RATE: 16 BRPM | DIASTOLIC BLOOD PRESSURE: 74 MMHG | SYSTOLIC BLOOD PRESSURE: 164 MMHG | HEART RATE: 57 BPM | OXYGEN SATURATION: 97 % | TEMPERATURE: 98 F

## 2023-11-29 DIAGNOSIS — R31.9 HEMATURIA, UNSPECIFIED TYPE: Primary | ICD-10-CM

## 2023-11-29 DIAGNOSIS — K59.00 CONSTIPATION, UNSPECIFIED CONSTIPATION TYPE: ICD-10-CM

## 2023-11-29 LAB
ALBUMIN SERPL BCP-MCNC: 2.9 G/DL (ref 3.5–5.2)
ALP SERPL-CCNC: 143 U/L (ref 55–135)
ALT SERPL W/O P-5'-P-CCNC: 12 U/L (ref 10–44)
ANION GAP SERPL CALC-SCNC: 11 MMOL/L (ref 8–16)
APTT PPP: 28.9 SEC (ref 21–32)
AST SERPL-CCNC: 14 U/L (ref 10–40)
BACTERIA #/AREA URNS AUTO: ABNORMAL /HPF
BACTERIA UR CULT: NORMAL
BACTERIA UR CULT: NORMAL
BASOPHILS # BLD AUTO: 0.04 K/UL (ref 0–0.2)
BASOPHILS NFR BLD: 0.5 % (ref 0–1.9)
BILIRUB SERPL-MCNC: 0.5 MG/DL (ref 0.1–1)
BILIRUB UR QL STRIP: NEGATIVE
BUN SERPL-MCNC: 27 MG/DL (ref 8–23)
CALCIUM SERPL-MCNC: 8.8 MG/DL (ref 8.7–10.5)
CHLORIDE SERPL-SCNC: 104 MMOL/L (ref 95–110)
CK SERPL-CCNC: 28 U/L (ref 20–200)
CLARITY UR REFRACT.AUTO: ABNORMAL
CO2 SERPL-SCNC: 26 MMOL/L (ref 23–29)
COLOR UR AUTO: ABNORMAL
CREAT SERPL-MCNC: 1.2 MG/DL (ref 0.5–1.4)
DIFFERENTIAL METHOD: ABNORMAL
EOSINOPHIL # BLD AUTO: 0.3 K/UL (ref 0–0.5)
EOSINOPHIL NFR BLD: 3.1 % (ref 0–8)
ERYTHROCYTE [DISTWIDTH] IN BLOOD BY AUTOMATED COUNT: 14.7 % (ref 11.5–14.5)
EST. GFR  (NO RACE VARIABLE): >60 ML/MIN/1.73 M^2
GLUCOSE SERPL-MCNC: 209 MG/DL (ref 70–110)
GLUCOSE UR QL STRIP: ABNORMAL
HCT VFR BLD AUTO: 35.7 % (ref 40–54)
HGB BLD-MCNC: 11.7 G/DL (ref 14–18)
HGB UR QL STRIP: ABNORMAL
HYALINE CASTS UR QL AUTO: 0 /LPF
IMM GRANULOCYTES # BLD AUTO: 0.03 K/UL (ref 0–0.04)
IMM GRANULOCYTES NFR BLD AUTO: 0.4 % (ref 0–0.5)
INR PPP: 1 (ref 0.8–1.2)
KETONES UR QL STRIP: NEGATIVE
LEUKOCYTE ESTERASE UR QL STRIP: ABNORMAL
LYMPHOCYTES # BLD AUTO: 1.4 K/UL (ref 1–4.8)
LYMPHOCYTES NFR BLD: 16.7 % (ref 18–48)
MCH RBC QN AUTO: 28.1 PG (ref 27–31)
MCHC RBC AUTO-ENTMCNC: 32.8 G/DL (ref 32–36)
MCV RBC AUTO: 86 FL (ref 82–98)
MICROSCOPIC COMMENT: ABNORMAL
MONOCYTES # BLD AUTO: 0.9 K/UL (ref 0.3–1)
MONOCYTES NFR BLD: 10.5 % (ref 4–15)
NEUTROPHILS # BLD AUTO: 5.6 K/UL (ref 1.8–7.7)
NEUTROPHILS NFR BLD: 68.8 % (ref 38–73)
NITRITE UR QL STRIP: NEGATIVE
NRBC BLD-RTO: 0 /100 WBC
PH UR STRIP: 5 [PH] (ref 5–8)
PLATELET # BLD AUTO: 230 K/UL (ref 150–450)
PMV BLD AUTO: 10.5 FL (ref 9.2–12.9)
POTASSIUM SERPL-SCNC: 3.7 MMOL/L (ref 3.5–5.1)
PROT SERPL-MCNC: 7.1 G/DL (ref 6–8.4)
PROT UR QL STRIP: ABNORMAL
PROTHROMBIN TIME: 10.8 SEC (ref 9–12.5)
RBC # BLD AUTO: 4.17 M/UL (ref 4.6–6.2)
RBC #/AREA URNS AUTO: >100 /HPF (ref 0–4)
SODIUM SERPL-SCNC: 141 MMOL/L (ref 136–145)
SP GR UR STRIP: 1.03 (ref 1–1.03)
URN SPEC COLLECT METH UR: ABNORMAL
WBC # BLD AUTO: 8.16 K/UL (ref 3.9–12.7)
WBC #/AREA URNS AUTO: >100 /HPF (ref 0–5)

## 2023-11-29 PROCEDURE — 82550 ASSAY OF CK (CPK): CPT | Performed by: EMERGENCY MEDICINE

## 2023-11-29 PROCEDURE — 99284 EMERGENCY DEPT VISIT MOD MDM: CPT | Mod: 25

## 2023-11-29 PROCEDURE — 85025 COMPLETE CBC W/AUTO DIFF WBC: CPT | Performed by: EMERGENCY MEDICINE

## 2023-11-29 PROCEDURE — 85730 THROMBOPLASTIN TIME PARTIAL: CPT | Performed by: EMERGENCY MEDICINE

## 2023-11-29 PROCEDURE — 85610 PROTHROMBIN TIME: CPT | Performed by: EMERGENCY MEDICINE

## 2023-11-29 PROCEDURE — 80053 COMPREHEN METABOLIC PANEL: CPT | Performed by: EMERGENCY MEDICINE

## 2023-11-29 PROCEDURE — 87086 URINE CULTURE/COLONY COUNT: CPT | Performed by: EMERGENCY MEDICINE

## 2023-11-29 PROCEDURE — 81001 URINALYSIS AUTO W/SCOPE: CPT | Performed by: EMERGENCY MEDICINE

## 2023-11-29 RX ORDER — DOCUSATE SODIUM 100 MG/1
100 CAPSULE, LIQUID FILLED ORAL 2 TIMES DAILY
Qty: 60 CAPSULE | Refills: 0 | Status: SHIPPED | OUTPATIENT
Start: 2023-11-29 | End: 2023-12-29

## 2023-11-29 RX ORDER — DEXTROMETHORPHAN POLISTIREX 30 MG/5 ML
1 SUSPENSION, EXTENDED RELEASE 12 HR ORAL DAILY PRN
Qty: 1 ENEMA | Refills: 0 | Status: SHIPPED | OUTPATIENT
Start: 2023-11-29 | End: 2024-02-29 | Stop reason: ALTCHOICE

## 2023-11-29 NOTE — ED TRIAGE NOTES
Pt presents to ED with c/o blood in urine x few days. Pt noticed dark red blood in urine 3 days ago. Denies any issues urinating.

## 2023-11-29 NOTE — ED NOTES
Kamar Muñoz, a 78 y.o. male presents to the ED w/ complaint of Hematuria for 2 days.      Adult Physical Assessment  LOC: Kamar Muñoz, 78 y.o. male verified via two identifiers.  The patient is awake, alert, oriented and speaking appropriately at this time.  APPEARANCE: Patient resting comfortably and appears to be in no acute distress at this time. Patient is clean and well groomed, patient's clothing is properly fastened.  SKIN:The skin is warm and dry, color consistent with ethnicity, patient has normal skin turgor and moist mucus membranes, skin intact, no breakdown or brusing noted.  MUSCULOSKELETAL: Patient moving all extremities well, no obvious swelling or deformities noted.  RESPIRATORY: Airway is open and patent, respirations are spontaneous, patient has a normal effort and rate, no accessory muscle use noted.  CARDIAC: Patient has a normal rate and rhythm, no periphreal edema noted in any extremity, capillary refill < 3 seconds in all extremities  ABDOMEN: Soft and non tender to palpation, no abdominal distention noted. Bowel sounds present in all four quadrants. Pt denies any abdominal pain.  NEUROLOGIC: Eyes open spontaneously, behavior appropriate to situation, follows commands, facial expression symmetrical, bilateral hand grasp equal and even, purposeful motor response noted, normal sensation in all extremities when touched with a finger.  : Pt has a condom catheter in place.      Triage note:  Chief Complaint   Patient presents with    Hematuria     From [facility]. 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

## 2023-11-29 NOTE — DISCHARGE INSTRUCTIONS
Diagnosis:  Urinary tract infection, blood in urine    You have a urinary tract infection which I think is what is causing the blood in your urine.  Your lab tests also showed signs of dehydration.  The blood in your urine is likely made worse by your blood thinner medicine.  I am prescribing a course of antibiotics to help clear the infection.    I sent an urgent referral to our Urology doctors for follow-up.  Please call to schedule follow-up appointment.  If you start to have any worsening symptoms, or new symptoms such as inability to urinate, fever, vomiting, confusion please return to the emergency department immediately.

## 2023-11-29 NOTE — ED PROVIDER NOTES
Encounter Date: 11/29/2023       History     Chief Complaint   Patient presents with    Hematuria     From [facility], was here yesterday for same thing and d/c. . Danica sent back and wants him admitted. Pt has no pain      79 yo M w/ medical history as noted below.  Patient is anticoagulated on apixaban.  He presents for hematuria.  He reports that his undergarment was being changed at the skilled nursing facility last night and they noted that he had some blood in his urine.  He was seen for this yesterday in his similar fashion.  He reports his hematuria has improved in the interim and then recurred.  He denies dizziness or lightheadedness.  He denies suprapubic fullness or abdominal pain.  He denies dysuria, increased urinary frequency, or urinary hesitancy or urgency.  He has no flank pain.  He is no fever chills.  He is unsure if she is still taking apixaban but is listed in his medication list.      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    Hyperlipidemia     Hyperosmolar hyperglycemic state (HHS) 1/29/2022    Hypertension     Kidney stone     Migraine headache     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 08/02/2012    Paroxysmal atrial fibrillation     Pressure sore     STEMI involving right coronary artery 01/09/2022    Type II or unspecified type diabetes mellitus with neurological manifestations, uncontrolled(250.62) 03/08/2013    Urinary  tract infection     UTI (urinary tract infection) 2023     Past Surgical History:   Procedure Laterality Date    BACK SURGERY      CATARACT EXTRACTION W/  INTRAOCULAR LENS IMPLANT Right     Per Dr Romero note 2018    COLONOSCOPY N/A 2019    Procedure: COLONOSCOPY Suprep;  Surgeon: Anh Johnson MD;  Location: [facility] ENDO;  Service: Endoscopy;  Laterality: N/A;    ESOPHAGOGASTRODUODENOSCOPY N/A 9/15/2022    Procedure: EGD (ESOPHAGOGASTRODUODENOSCOPY);  Surgeon: Dashawn Evans MD;  Location: [facility] ENDO;  Service: Endoscopy;  Laterality: N/A;    EYE SURGERY      HERNIA REPAIR      INJECTION, SACROILIAC JOINT Left 2023    Procedure: INJECTION,SACROILIAC JOINT, LEFT SI;  Surgeon: Lucas Ramirez MD;  Location: [facility] PAIN MGT;  Service: Pain Management;  Laterality: Left;    KYPHOPLASTY, SPINE, LUMBAR N/A 2023    Procedure: KYPHOPLASTY, SPINE, LUMBAR;  Surgeon: Kimberly Spicer MD;  Location: [facility] OR;  Service: Neurosurgery;  Laterality: N/A;  Ane: Gen  Blood: Type & Hold  Pos: Prone  Rad: C-arm x 2**  Spec Equip: Depuy Synthes - Tray Cortez    LEFT HEART CATHETERIZATION Left 2022    Procedure: CATHETERIZATION, HEART, LEFT;  Surgeon: Will Hurst III, MD;  Location: [facility] CATH LAB/EP;  Service: Cardiology;  Laterality: Left;    renal stones      SHOULDER OPEN ROTATOR CUFF REPAIR       Family History   Problem Relation Age of Onset    Diabetes Father     Prostate cancer Neg Hx     Kidney disease Neg Hx      Social History     Tobacco Use    Smoking status: Former     Current packs/day: 0.00     Average packs/day: 1.5 packs/day for 25.0 years (37.5 ttl pk-yrs)     Types: Cigarettes     Start date: 1958     Quit date: 1983     Years since quittin.9    Smokeless tobacco: Never   Substance Use Topics    Alcohol use: No    Drug use: No     Review of Systems  All other systems reviewed and negative except as noted in HPI    Physical Exam     Initial Vitals [23 1439]    BP Pulse Resp Temp SpO2   137/76 73 18 98.1 °F (36.7 °C) 98 %      MAP       --         Physical Exam    Nursing note and vitals reviewed.  Constitutional: He appears well-developed and well-nourished.   HENT:   Head: Normocephalic and atraumatic.   Eyes: Conjunctivae and EOM are normal. Pupils are equal, round, and reactive to light.   Neck: Neck supple.   Normal range of motion.  Cardiovascular:  Regular rhythm, normal heart sounds and intact distal pulses.           Bradycardic, regular rhythm   Pulmonary/Chest: Breath sounds normal. No respiratory distress.   Abdominal: Abdomen is soft. He exhibits no distension.   Musculoskeletal:      Cervical back: Normal range of motion and neck supple.           ED Course   Procedures  Labs Reviewed   CBC W/ AUTO DIFFERENTIAL - Abnormal; Notable for the following components:       Result Value    RBC 4.17 (*)     Hemoglobin 11.7 (*)     Hematocrit 35.7 (*)     RDW 14.7 (*)     Lymph % 16.7 (*)     All other components within normal limits   COMPREHENSIVE METABOLIC PANEL - Abnormal; Notable for the following components:    Glucose 209 (*)     BUN 27 (*)     Albumin 2.9 (*)     Alkaline Phosphatase 143 (*)     All other components within normal limits   URINALYSIS, REFLEX TO URINE CULTURE - Abnormal; Notable for the following components:    Color, UA Red (*)     Appearance, UA Cloudy (*)     Protein, UA 2+ (*)     Glucose, UA 1+ (*)     Occult Blood UA 3+ (*)     Leukocytes, UA 2+ (*)     All other components within normal limits    Narrative:     Specimen Source->Urine   URINALYSIS MICROSCOPIC - Abnormal; Notable for the following components:    RBC, UA >100 (*)     WBC, UA >100 (*)     All other components within normal limits    Narrative:     Specimen Source->Urine   CULTURE, URINE   PROTIME-INR   APTT   CK          Imaging Results              CT Renal Stone Study ABD Pelvis WO (Final result)  Result time 11/29/23 20:34:42      Final result by Terry Ralph DO  (11/29/23 20:34:42)                   Impression:      1. Large volume of rectal stool with rectal wall thickening, suggestive of fecal impaction with possible stercoral colitis.  No pneumatosis.  2. Small left pleural effusion with pleural nodularity.  Malignant effusion not excluded, consider fluid sampling.  3. New compression fracture of the L2 vertebral body, correlate with point tenderness.  4. Nonobstructive bilateral renal calculi.  No hydronephrosis or obstructing stone.  5. Cholelithiasis without acute cholecystitis.  6. Urinary bladder wall thickening, correlate with urinalysis to exclude cystitis.      Electronically signed by: Terry Ralph  Date:    11/29/2023  Time:    20:34               Narrative:    EXAMINATION:  CT RENAL STONE STUDY ABD PELVIS WO    CLINICAL HISTORY:  Nephrolithiasis, uncomplicated;    TECHNIQUE:  Multiplanar images were obtained of the abdomen and pelvis from the hemidiaphragms through the symphysis pubis without intravenous contrast.    COMPARISON:  CT of the abdomen and pelvis from 02/27/2023.    FINDINGS:  Lung Bases: There is left pleural nodularity, partially imaged.  There is a small left pleural effusion.  There are mild patchy opacities in the posterior left lower lobe, likely atelectasis.  There are emphysematous changes of the lung bases.    Heart: Heart size is normal.  No pericardial effusion.    Liver: Normal in size and attenuation without focal hepatic lesion.    Biliary tract: No intrahepatic or extrahepatic biliary ductal dilatation.    Gallbladder: There are small layering gallstones.  There is no gallbladder wall thickening or pericholecystic fluid.    Pancreas: Normal. No pancreatic ductal dilatation.    Spleen: Normal size without focal lesion.    Adrenals: Normal.    Kidneys and urinary collecting systems: There are bilateral nonobstructing renal calculi measuring up to 2-3 mm.  There is no hydronephrosis or obstructing ureteral stone.  There are several  simple cysts and too small to characterize hypodensities in the bilateral kidneys, stable from prior.  There is mild nonspecific bilateral perinephric fat stranding, likely related to senescent changes and stable from prior.    Lymph nodes: None enlarged.    Stomach and bowel: The stomach is normal.  There is a large volume of stool in the rectum.  The rectum measures approximately 9.2 cm in maximal AP diameter, slightly improved from prior.  There is associated mild rectal wall thickening.  There is no pneumatosis.  There is colonic diverticulosis without evidence of acute diverticulitis.  There is no bowel obstruction.  The appendix is visualized and is normal.    Peritoneum and mesentery: No ascites or free intraperitoneal air. No abdominal fluid collection.    Vasculature: There is severe atherosclerosis.    Urinary bladder: There is urinary bladder wall thickening.    Reproductive organs: The prostate is not enlarged.    Body wall: No abnormality.    Musculoskeletal: No aggressive osseous lesion.  There is osteopenia.  There are degenerative changes.  There is a remote compression fracture of the L1 vertebral body with vertebroplasty cement in place, with unchanged height loss from prior.  There is a new compression fracture of the L2 vertebral body.  There are multilevel degenerative changes of the spine.                                       Medications - No data to display  Medical Decision Making  See ED course for additional HPI, ROS, PE, lab, imaging, consultant discussion, procedure interpretation, and Medical Decision Making.      Amount and/or Complexity of Data Reviewed  Independent Historian: EMS     Details: Patient noted to have hematuria on undergarment at nursing home 2 days in a row.  His primary care nurse practitioner reports that he has had no urinary complaints complaints of constipation but has had hematuria for 2 days  External Data Reviewed: labs, radiology, ECG and notes.     Details:  History of daily Plavix use  Seen yesterday for same complaint  Labs: ordered. Decision-making details documented in ED Course.  Radiology: ordered and independent interpretation performed. Decision-making details documented in ED Course.    Risk  OTC drugs.  Prescription drug management.  Drug therapy requiring intensive monitoring for toxicity.  Decision regarding hospitalization.  Risk Details: Contacted Urology to help arrange patient's follow up for the coming week               ED Course as of 11/30/23 1735   Wed Nov 29, 2023   1722 CBC auto differential(!)  Hemoglobin and hematocrit stable [DS]   1910 Comprehensive metabolic panel(!)  BUN remains elevated but slightly improved from previous [DS]   1910 Protime-INR [DS]   1910 CK [DS]   1947 Ongoing microscopic hematuria noted again. Present since 2/2023.  Bedside ultrasound without obvious mass but some mild hydronephrosis noted.  Will obtain CT noncontrast of abdomen [DS]   2125 Patient has no evidence of clotting and bladder on bedside ultrasound or on CT scan.  He continues to have good urine flow and no evidence urinary retention.  Given these findings, patient is safe to discharge without further intervention.  I wanted to ensure that the patient has urologic follow-up, so I discussed him with you urology resident on call to ensure follow-up.  However, given his lack of retention due to clots, I do not feel he warranted emergent Urology consultation.  His bleeding of his likely due to his significant colonic burden pressing against his prostate and extending his bladder exposing some blood vessels.  I contacted the patient's primary care nurse practitioner Tianna Zurita and notified her of his findings and plan [DS]      ED Course User Index  [DS] Sessions, Bam CALVILLO MD                           Clinical Impression:  Final diagnoses:  [R31.9] Hematuria, unspecified type (Primary)  [K59.00] Constipation, unspecified constipation type          ED  Disposition Condition    Discharge Stable          ED Prescriptions       Medication Sig Dispense Start Date End Date Auth. Provider    docusate sodium (COLACE) 100 MG capsule Take 1 capsule (100 mg total) by mouth 2 (two) times daily. 60 capsule 11/29/2023 12/29/2023 Bam Faustin MD    mineral oil (FLEET OIL RETENTION) enema Place 133 mLs (1 enema total) rectally daily as needed for Constipation. 1 enema 11/29/2023 -- Roxie, Bam CALVILLO MD          Follow-up Information       Follow up With Specialties Details Why Contact Info Additional Information    [facility] - Urology [facility] Schedule an appointment as soon as possible for a visit   [address] [facility] [address] [phone] Main Building, 4th Floor Please park in [facility] and take [facility]rium elevator             Roxie, Bam CALVILLO MD  11/30/23 4231

## 2023-11-29 NOTE — PROGRESS NOTES
[facility] - Intensive Care ([address] Medicine  Progress Note    Patient Name: Kamar Muñoz  MRN: [id number]  Patient Class: IP- Inpatient   Admission Date: 4/5/2022  Length of Stay: 2 days  Attending Physician: Renée Frye MD  Primary Care Provider: Basim Guerrero MD        Subjective:     Principal Problem:Diabetic ketoacidosis without coma associated with type 2 diabetes mellitus        HPI:  Mr. 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 He reports that his home BG runs in 300 and 500s. EMS found his with BG unreadable. Patient states that he took his insulin last night. He took 9 units novolog last night. As of this morning, he has not eaten and did not take his morning insulin. He reports nausea and weakness. Denies chest pain, SOB, palpitations, fever, chills, abdominal pain, constipation. Reports some diarrhea that started in the SNF.     He reports eating toast, cereal, PB&J sandwiches, uses sweet and low for coffee. Denies consuming excessively starchy or sugary foods.     The patient was recently discharged from SNF after being transferred there following his hospitalization for Covid with respiratory failure, DKA, metabolic encephalopathy, requiring ICU admission. He was admitted for weakness/debility and continued insulin adjustments. Admission to SNF was for secondary weakness debility, continued insulin adjustments. BG had been labile during his SNF stay and difficult to control with hypoglycemic events as well. Patient was discharged with insulin aspart 6 TID and glargine 12 units daily with sliding scale.     In the ED, significant labs include BG of 626, bicarb 14, BNB 5.9, UA with 3+ ketones, 3+ glucose, occult blood. VBG showed mild acidosis with pH of 7.3. He  received insulin regular bolus, zofran, NS 1 L x2, and was started on insulin drip.                  Overview/Hospital Course:  Mr. 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 He reports that his home BG runs in 300 and 500s. BG improved overnight on insulin drip and IVF. Morning BG were in 130s. Started basal and bolus insulin along with diabetic diet and boost glucose control. BG up to 400-500s on 4/7. Increased detemir to 15 qhs, aspart 12 TID. Required extra daytime doses of aspart.       Interval History: NAOE, hyperglycemic during the day. Insulin increased. Diarrhea resolved.     Review of Systems   Constitutional:  Negative for chills and fever.   HENT: Negative.     Eyes: Negative.    Respiratory:  Negative for cough and shortness of breath.    Cardiovascular:  Negative for chest pain, palpitations and leg swelling.   Gastrointestinal:  Negative for abdominal pain, constipation, diarrhea, nausea and vomiting.   Endocrine: Positive for polyuria.   Genitourinary:  Negative for dysuria and hematuria.   Musculoskeletal:  Positive for back pain (sacral). Negative for arthralgias.   Skin:  Positive for wound.   Neurological:  Positive for weakness. Negative for dizziness and headaches.   Hematological: Negative.    Psychiatric/Behavioral: Negative.     Objective:     Vital Signs (Most Recent):  Temp: 98.1 °F (36.7 °C) (04/07/22 1104)  Pulse: 70 (04/07/22 1429)  Resp: 14 (04/07/22 1104)  BP: 135/73 (04/07/22 1104)  SpO2: 99 % (04/07/22 1104) Vital Signs (24h Range):  Temp:  [97.5 °F (36.4 °C)-98.3 °F (36.8 °C)] 98.1 °F (36.7 °C)  Pulse:  [58-74] 70  Resp:  [14-19] 14  SpO2:  [16 %-99 %] 99 %  BP: (135-158)/(67-81) 135/73     Weight: 75 kg (165 lb 5.5 oz)  Body mass index is 21.23 kg/m².    Intake/Output  Summary (Last 24 hours) at 4/7/2022 1616  Last data filed at 4/7/2022 1100  Gross per 24 hour   Intake 260 ml   Output 400 ml   Net -140 ml      Physical Exam  Constitutional:       Appearance: Normal appearance.   HENT:      Head: Normocephalic and atraumatic.      Nose: Nose normal.      Mouth/Throat:      Mouth: Mucous membranes are dry.   Eyes:      General: No scleral icterus.  Cardiovascular:      Rate and Rhythm: Normal rate and regular rhythm.      Pulses: Normal pulses.      Heart sounds: Normal heart sounds. No murmur heard.  Pulmonary:      Effort: Pulmonary effort is normal. No respiratory distress.      Breath sounds: Normal breath sounds. No wheezing or rales.   Abdominal:      General: Bowel sounds are normal. There is no distension.      Palpations: Abdomen is soft.   Musculoskeletal:         General: Normal range of motion.      Cervical back: Normal range of motion. No rigidity.      Right lower leg: No edema.      Left lower leg: No edema.   Skin:     General: Skin is dry.          Neurological:      General: No focal deficit present.      Mental Status: He is alert and oriented to person, place, and time.      Motor: No weakness.   Psychiatric:         Mood and Affect: Mood normal.         Behavior: Behavior normal.       Significant Labs: All pertinent labs within the past 24 hours have been reviewed.  CBC:   Recent Labs   Lab 04/06/22  0324 04/07/22  0416   WBC 7.87 8.04   HGB 11.9* 11.8*   HCT 37.2* 37.3*    227     CMP:   Recent Labs   Lab 04/06/22  0324 04/06/22  0804 04/07/22  0416    141 137   K 4.1 4.9 3.7    105 104   CO2 26 27 24   * 123* 220*   BUN 25* 23 15   CREATININE 1.0 1.1 0.8   CALCIUM 8.6* 8.8 8.2*   PROT  --   --  6.1   ALBUMIN  --   --  2.4*   BILITOT  --   --  0.4   ALKPHOS  --   --  101   AST  --   --  18   ALT  --   --  10   ANIONGAP 10 9 9   EGFRNONAA >60.0 >60.0 >60.0     POCT Glucose:   Recent Labs   Lab 04/07/22  1100 04/07/22  1431  04/07/22  1534   POCTGLUCOSE 400* >500* 463*       Significant Imaging: I have reviewed all pertinent imaging results/findings within the past 24 hours.      Assessment/Plan:      * Diabetic ketoacidosis without coma associated with type 2 diabetes mellitus  DKA likely 2/2 to inadequate insulin regimen/diet noncompliance/difficulty managing meds at home given that his wife is also ill. Poorly controlled at SNF prior to DC, and likely continuation of that. No evidence of UTI or pneumonia at this time. Does not meet SIRdS/sepsis criteria. Possible source may be sacral injury, but does not appear infected at this time. Insulin drip and IVF on 4/5-4/6. Elevated BG at 500s again on 4/7    -- poct glucose ACHS and 2am  -- HbA1C 9.7  -- diabetic diet and boost glucose control  -- AG closed, bicarb wnl as of 4/6        Severe malnutrition    Boost glucose control TID    Chronic anticoagulation  Chronically on Eliquis      Focal seizures  Continue home lacosamide      History of ST elevation myocardial infarction (STEMI)  Hx of RCA STEMI in Jan s/p LAUREN placement on 1/9/22. Was on ASA/Plavix at home    - Will continue plavix only given that he is on apixaban since it's been greater than 1 month since LAUREN placement  - No need for triple therapy (ASA/Plavix/eliquis) beyond 1 month      Stented coronary artery  CAD s/p STEMI and RCA LAUREN placed 1/9/22      Paroxysmal atrial fibrillation  Patient with Paroxysmal (<7 days) atrial fibrillation which is controlled currently with Amiodarone. Patient is currently in sinus rhythm.[id number] Score: 4. Anticoagulation indicated. Anticoagulation done with eliquis.        History of embolic stroke  Hx of stroke in Jan 2022.     - continue home lipitor  - PT/OT for residual weakness/debilty     Coronary artery disease involving native coronary artery of native heart with unstable angina pectoris  CAD s/p LAUREN to RCA after STEMI in Jan 2022.     - DC aspirin  - Continue statin, plavix,  eliquis    Centrilobular emphysema  Clinically asymptomatic      Type 2 diabetes mellitus with hyperglycemia, with long-term current use of insulin  IDDM  Currently poorly controlled  Meds: Metformin 1000 BID, insulin aspart 6 TID, insulin glargine 12 daily    Lab Results   Component Value Date    HGBA1C 9.7 (H) 04/05/2022     - detemir 15, aspart 12 TID  - eats small breakfast, and bigger lunch/dinner. May need different doses for premeal insulin by meal.   - consider outpt dexcom  - endocrinology referral at discharge      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  Will continue home dose gabapentin 200 TID      Hypertension associated with diabetes  Normotensive on admission.     - continue home dose metoprolol succinate 50 daily and losartan 25 daily        VTE Risk Mitigation (From admission, onward)         Ordered     apixaban tablet 5 mg  2 times daily         04/05/22 1601     IP VTE HIGH RISK PATIENT  Once         04/05/22 1553     Place sequential compression device  Until discontinued         04/05/22 1547                Discharge Planning   ALLIE: 4/8/2022     Code Status: Full Code   Is the patient medically ready for discharge?: No    Reason for patient still in hospital (select all that apply): Patient trending condition and Treatment  Discharge Plan A: Home Health   Discharge Delays: None known at this time              Ange Alexander MD  Department of Hospital Medicine   [facility] - Intensive Care ([facility]-)     stretcher

## 2023-11-29 NOTE — ED NOTES
Assumed care of the patient. Pt placed on continuous pulse oximetry, and automatic BP cuff cycling Q15min. Pt in hospital gown, side rails up X2, bed low and locked, and call light is placed within reach. No family/visitors at bedside at this time. Pt denies any complaints or needs.

## 2023-11-30 LAB — BACTERIA UR CULT: NORMAL

## 2023-11-30 NOTE — DISCHARGE INSTRUCTIONS
He is a mineral oil daily until bowel movement     Take the Colace for the next month to prevent recurrence of constipation    Urology clinic will contact you to arrange follow-up next week     If you experience any new or concerning symptoms, return to the emergency department    Continue antibiotics until they are complete

## 2023-11-30 NOTE — ED NOTES
Pt cleaned of incontinence at this time. Diaper brief removed. Chucks pads applied. Purewick reapplied for urine sample.     Nurses Note -- 4 Eyes      11/29/2023   6:16 PM      Skin assessed during: Daily Assessment      [x] No Altered Skin Integrity Present    [x]Prevention Measures Documented      [] Yes- Altered Skin Integrity Present or Discovered   [] LDA Added if Not in Epic (Describe Wound)   [] New Altered Skin Integrity was Present on Admit and Documented in LDA   [] Wound Image Taken    Wound Care Consulted? No    Attending Nurse:  Myranda Castillo RN/Staff Member:   Olinda Reyes RN

## 2023-12-04 ENCOUNTER — OFFICE VISIT (OUTPATIENT)
Dept: UROLOGY | Facility: CLINIC | Age: 78
End: 2023-12-04
Payer: MEDICARE

## 2023-12-04 VITALS
BODY MASS INDEX: 23.11 KG/M2 | HEART RATE: 81 BPM | HEIGHT: 74 IN | DIASTOLIC BLOOD PRESSURE: 70 MMHG | SYSTOLIC BLOOD PRESSURE: 109 MMHG

## 2023-12-04 DIAGNOSIS — N30.01 ACUTE CYSTITIS WITH HEMATURIA: ICD-10-CM

## 2023-12-04 PROCEDURE — 99204 PR OFFICE/OUTPT VISIT, NEW, LEVL IV, 45-59 MIN: ICD-10-PCS | Mod: S$PBB,,, | Performed by: UROLOGY

## 2023-12-04 PROCEDURE — 99204 OFFICE O/P NEW MOD 45 MIN: CPT | Mod: S$PBB,,, | Performed by: UROLOGY

## 2023-12-04 PROCEDURE — 99999 PR PBB SHADOW E&M-EST. PATIENT-LVL V: ICD-10-PCS | Mod: PBBFAC,,, | Performed by: UROLOGY

## 2023-12-04 PROCEDURE — 99215 OFFICE O/P EST HI 40 MIN: CPT | Mod: PBBFAC,PO | Performed by: UROLOGY

## 2023-12-04 PROCEDURE — 99999 PR PBB SHADOW E&M-EST. PATIENT-LVL V: CPT | Mod: PBBFAC,,, | Performed by: UROLOGY

## 2023-12-04 RX ORDER — PREDNISONE 50 MG/1
TABLET ORAL
Qty: 3 TABLET | Refills: 0 | Status: SHIPPED | OUTPATIENT
Start: 2023-12-04 | End: 2024-02-29 | Stop reason: ALTCHOICE

## 2023-12-04 RX ORDER — DIPHENHYDRAMINE HCL 50 MG
CAPSULE ORAL
Qty: 1 CAPSULE | Refills: 0 | Status: SHIPPED | OUTPATIENT
Start: 2023-12-04 | End: 2024-02-29 | Stop reason: ALTCHOICE

## 2023-12-04 NOTE — PROGRESS NOTES
[facility] - [facility]rology   Clinic Note    SUBJECTIVE:     Chief Complaint: Gross Hematuria    Referral from: Self, Aaareferral.    History of Present Illness:  Kamar Muñoz is a 78 y.o. male who presents to clinic for evaluation of gross hematuria.    Here for evaluation of gross hematuria.  No recent UTIs. No personal or family history of urologic cancers. Endorses any previous smoking history. Denies a previous history of hematuria. He has not had a hematuria workup performed before. Takes eliquis.    Patient endorses no additional complaints at this time.    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    Hyperlipidemia     Hyperosmolar hyperglycemic state (HHS) 1/29/2022    Hypertension     Kidney stone     Migraine headache     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 08/02/2012    Paroxysmal atrial fibrillation     Pressure sore     STEMI involving right coronary artery 01/09/2022    Type II or unspecified type diabetes mellitus with neurological manifestations, uncontrolled(250.62) 03/08/2013    Urinary tract infection     UTI (urinary tract infection) 2/28/2023       Past Surgical History:   Procedure Laterality Date    BACK SURGERY      CATARACT EXTRACTION W/  INTRAOCULAR LENS IMPLANT Right     Per Dr Romero note 11/2018    COLONOSCOPY N/A 1/28/2019    Procedure: COLONOSCOPY Suprep;  Surgeon: Anh Johnson MD;  Location: [facility];  Service: Endoscopy;  Laterality: N/A;    ESOPHAGOGASTRODUODENOSCOPY N/A 9/15/2022    Procedure: EGD (ESOPHAGOGASTRODUODENOSCOPY);  Surgeon: Dashawn Evans MD;  Location: [facility]  ENDO;  Service: Endoscopy;  Laterality: N/A;    EYE SURGERY      HERNIA REPAIR      INJECTION, SACROILIAC JOINT Left 2023    Procedure: INJECTION,SACROILIAC JOINT, LEFT SI;  Surgeon: Lucas Ramirez MD;  Location: [facility], operated by [facility] PAIN MGT;  Service: Pain Management;  Laterality: Left;    KYPHOPLASTY, SPINE, LUMBAR N/A 2023    Procedure: KYPHOPLASTY, SPINE, LUMBAR;  Surgeon: Kimberly Spicer MD;  Location: [facility] operated [facility] OR;  Service: Neurosurgery;  Laterality: N/A;  Ane: Gen  Blood: Type & Hold  Pos: Prone  Rad: C-arm x 2  Spec Equip: Dep[employer] - Tray Cortez    LEFT HEART CATHETERIZATION Left 2022    Procedure: CATHETERIZATION, HEART, LEFT;  Surgeon: Will Hurst III, MD;  Location: [facility] CATH LAB/EP;  Service: Cardiology;  Laterality: Left;    renal stones      SHOULDER OPEN ROTATOR CUFF REPAIR         Family History   Problem Relation Age of Onset    Diabetes Father     Prostate cancer Neg Hx     Kidney disease Neg Hx        Social History     Tobacco Use    Smoking status: Former     Current packs/day: 0.00     Average packs/day: 1.5 packs/day for 25.0 years (37.5 ttl pk-yrs)     Types: Cigarettes     Start date: 1958     Quit date: 1983     Years since quittin.9    Smokeless tobacco: Never   Substance Use Topics    Alcohol use: No    Drug use: No       Current Outpatient Medications on File Prior to Visit   Medication Sig Dispense Refill    amiodarone (PACERONE) 200 MG Tab Take 1 tablet (200 mg total) by mouth once daily. 90 tablet 3    blood sugar diagnostic Strp 1 strip by Misc.(Non-Drug; Combo Route) route 2 (two) times a day. 200 strip 6    docusate sodium (COLACE) 100 MG capsule Take 1 capsule (100 mg total) by mouth 2 (two) times daily. 60 capsule 0    ELIQUIS 5 mg Tab Take 5 mg by mouth 2 (two) times daily.      insulin aspart U-100 (NOVOLOG) 100 unit/mL (3 mL) InPn pen Inject 8 Units into the skin 3 (three) times daily with meals. 86.4 mL 0    lancets (ONETOUCH ULTRASOFT LANCETS) Misc 1  "lancet by Misc.(Non-Drug; Combo Route) route 2 (two) times a day. 200 each 6    LANTUS SOLOSTAR U-100 INSULIN glargine 100 units/mL SubQ pen Inject into the skin.      losartan (COZAAR) 25 MG tablet Take 25 mg by mouth once daily.      metFORMIN (GLUCOPHAGE) 500 MG tablet Take 500 mg by mouth 2 (two) times daily.      metoprolol succinate (TOPROL-XL) 25 MG 24 hr tablet Take 25 mg by mouth once daily.      mineral oil (FLEET OIL RETENTION) enema Place 133 mLs (1 enema total) rectally daily as needed for Constipation. 1 enema 0    ondansetron (ZOFRAN) 4 MG tablet Take 4 mg by mouth every 8 (eight) hours as needed.      pen needle, diabetic (PEN NEEDLE) 30 gauge x 5/16" Ndle 1 Units by Misc.(Non-Drug; Combo Route) route 3 (three) times daily. 100 each 3    polyethylene glycol (GLYCOLAX) 17 gram/dose powder Use cap to measure out (17 g) mix with a liquid and take by mouth once daily. 510 g 2    SITagliptin phosphate (JANUVIA) 50 MG Tab Take 1 tablet (50 mg total) by mouth once daily. 30 tablet 2    sucralfate (CARAFATE) 1 gram tablet Take 1 g by mouth 3 (three) times daily.      tamsulosin (FLOMAX) 0.4 mg Cap Take 1 capsule by mouth once daily.      vitamin D (VITAMIN D3) 1000 units Tab Take 1,000 Units by mouth once daily.      acetaminophen (TYLENOL) 500 MG tablet Take 2 tablets (1,000 mg total) by mouth every 8 (eight) hours. (Patient not taking: Reported on 12/4/2023) 60 tablet 0    atorvastatin (LIPITOR) 40 MG tablet Take 1 tablet (40 mg total) by mouth once daily. 90 tablet 3    cephALEXin (KEFLEX) 500 MG capsule Take 1 capsule (500 mg total) by mouth 4 (four) times daily. for 7 days (Patient not taking: Reported on 12/4/2023) 28 capsule 0    gabapentin (NEURONTIN) 100 MG capsule Take 1 capsule (100 mg total) by mouth 2 (two) times daily. 60 capsule 11    glucose 4 GM chewable tablet Take 6 tablets (24 g total) by mouth as needed for Low blood sugar (< 50).  12    insulin detemir U-100, Levemir, 100 unit/mL (3 mL) " "SubQ InPn pen Inject 14 Units into the skin once daily. 4.2 mL 0    nitroGLYCERIN (NITROSTAT) 0.4 MG SL tablet Place 1 tablet (0.4 mg total) under the tongue every 5 (five) minutes as needed for Chest pain. 25 tablet 11    pantoprazole (PROTONIX) 40 MG tablet Take 1 tablet (40 mg total) by mouth once daily. 30 tablet 11    [DISCONTINUED] albuterol (PROVENTIL/VENTOLIN HFA) 90 mcg/actuation inhaler Inhale 2 puffs into the lungs every 4 (four) hours as needed for Wheezing (Pt states he will take it on his own. MDI placed by bedside.). Rescue      [DISCONTINUED] blood-glucose meter,continuous (DEXCOM G5 ) Misc 1 Device by Misc.(Non-Drug; Combo Route) route once daily at 6am. 1 each 0    [DISCONTINUED] blood-glucose transmitter (DEXCOM G5 TRANSMITTER) Stephanie 1 Device by Misc.(Non-Drug; Combo Route) route once daily at 6am. 1 each 0    [DISCONTINUED] diltiaZEM (CARDIZEM CD) 120 MG Cp24 Take 1 capsule (120 mg total) by mouth once daily. 90 capsule 3     No current facility-administered medications on file prior to visit.       Review of patient's allergies indicates:   Allergen Reactions    Iodine      Other reaction(s): swelling  Other reaction(s): Itching  Other reaction(s): Rash / IVP       Review of Systems:  A review of 10+ systems was conducted with pertinent positive and negative findings documented in HPI with all other systems reviewed and negative.    OBJECTIVE:     Estimated body mass index is 23.11 kg/m² as calculated from the following:    Height as of this encounter: 6' 2" (1.88 m).    Weight as of 11/29/23: 81.6 kg (180 lb).    Vital Signs (Most Recent)  Vitals:    12/04/23 0814   BP: 109/70   Pulse: 81       Physical Exam:  GENERAL: patient sitting comfortably  HEENT: normocephalic  NECK: supple, no JVD  PULM: normal chest rise, no increased WOB  HEART: non-diaphoretic  ABDO: soft, nondistended, nontender  BACK: no CVA tenderness bilaterally  SKIN: warm, dry, well perfused  EXT: no bruising or " edema  NEURO: grossly normal with no focal deficits  PSYCH: appropriate mood and affect    Genitourinary Exam:  deferred    Lab Results   Component Value Date    BUN 27 (H) 11/29/2023    CREATININE 1.2 11/29/2023    WBC 8.16 11/29/2023    HGB 11.7 (L) 11/29/2023    HCT 35.7 (L) 11/29/2023     11/29/2023    AST 14 11/29/2023    ALT 12 11/29/2023    ALKPHOS 143 (H) 11/29/2023    ALBUMIN 2.9 (L) 11/29/2023    HGBA1C 8.9 (H) 02/28/2023        PSA:  Lab Results   Component Value Date    PSA 0.46 06/20/2012    PSA 0.36 04/27/2012    PSA 0.37 05/02/2011    PSA 0.29 06/03/2009    PSA 0.3 04/23/2008    PSA 0.3 04/30/2007    PSA 0.4 03/31/2006    PSA 0.3 11/04/2004       Imaging:  All relevant imaging studies have been reviewed personally by me.    Results for orders placed or performed during the hospital encounter of 11/29/23 (from the past 2160 hour(s))   CT Renal Stone Study ABD Pelvis WO    Narrative    EXAMINATION:  CT RENAL STONE STUDY ABD PELVIS WO    CLINICAL HISTORY:  Nephrolithiasis, uncomplicated;    TECHNIQUE:  Multiplanar images were obtained of the abdomen and pelvis from the hemidiaphragms through the symphysis pubis without intravenous contrast.    COMPARISON:  CT of the abdomen and pelvis from 02/27/2023.    FINDINGS:  Lung Bases: There is left pleural nodularity, partially imaged.  There is a small left pleural effusion.  There are mild patchy opacities in the posterior left lower lobe, likely atelectasis.  There are emphysematous changes of the lung bases.    Heart: Heart size is normal.  No pericardial effusion.    Liver: Normal in size and attenuation without focal hepatic lesion.    Biliary tract: No intrahepatic or extrahepatic biliary ductal dilatation.    Gallbladder: There are small layering gallstones.  There is no gallbladder wall thickening or pericholecystic fluid.    Pancreas: Normal. No pancreatic ductal dilatation.    Spleen: Normal size without focal lesion.    Adrenals:  Normal.    Kidneys and urinary collecting systems: There are bilateral nonobstructing renal calculi measuring up to 2-3 mm.  There is no hydronephrosis or obstructing ureteral stone.  There are several simple cysts and too small to characterize hypodensities in the bilateral kidneys, stable from prior.  There is mild nonspecific bilateral perinephric fat stranding, likely related to senescent changes and stable from prior.    Lymph nodes: None enlarged.    Stomach and bowel: The stomach is normal.  There is a large volume of stool in the rectum.  The rectum measures approximately 9.2 cm in maximal AP diameter, slightly improved from prior.  There is associated mild rectal wall thickening.  There is no pneumatosis.  There is colonic diverticulosis without evidence of acute diverticulitis.  There is no bowel obstruction.  The appendix is visualized and is normal.    Peritoneum and mesentery: No ascites or free intraperitoneal air. No abdominal fluid collection.    Vasculature: There is severe atherosclerosis.    Urinary bladder: There is urinary bladder wall thickening.    Reproductive organs: The prostate is not enlarged.    Body wall: No abnormality.    Musculoskeletal: No aggressive osseous lesion.  There is osteopenia.  There are degenerative changes.  There is a remote compression fracture of the L1 vertebral body with vertebroplasty cement in place, with unchanged height loss from prior.  There is a new compression fracture of the L2 vertebral body.  There are multilevel degenerative changes of the spine.      Impression    1. Large volume of rectal stool with rectal wall thickening, suggestive of fecal impaction with possible stercoral colitis.  No pneumatosis.  2. Small left pleural effusion with pleural nodularity.  Malignant effusion not excluded, consider fluid sampling.  3. New compression fracture of the L2 vertebral body, correlate with point tenderness.  4. Nonobstructive bilateral renal calculi.  No  hydronephrosis or obstructing stone.  5. Cholelithiasis without acute cholecystitis.  6. Urinary bladder wall thickening, correlate with urinalysis to exclude cystitis.      Electronically signed by: Terry Ralph  Date:    11/29/2023  Time:    20:34     No results found for this or any previous visit (from the past 2160 hour(s)).      ASSESSMENT     1. Acute cystitis with hematuria        PLAN:     Gross Hematuria    - He has gross hematuria.    I counseled the patient on the AUA Guidelines for a hematuria workup. An evaluation is recommend on all patients with gross hematuria. The goal of upper tract imaging in patients is to identify malignancies of the renal parenchyma and upper tract urothelium, as well as to identify actionable non-malignant diagnoses of the kidney, collecting system, and ureters. The choice of imaging modality involves tradeoffs between diagnostic accuracy versus risk. The evaluation also involves a flexible cystoscopy performed in the clinic. A urine cytology may also be sent at the time of cystoscopy.    - Plan for flexible cystoscopy, urine cytology, and CT urogram.    Zia Valles MD  Urology  [facility]    Disclaimer: This note has been generated using voice-recognition software. There may be typographical errors that have been missed during proof-reading.

## 2023-12-08 ENCOUNTER — HOSPITAL ENCOUNTER (OUTPATIENT)
Dept: RADIOLOGY | Facility: HOSPITAL | Age: 78
Discharge: HOME | End: 2023-12-08
Attending: UROLOGY
Payer: MEDICARE

## 2023-12-08 DIAGNOSIS — N30.01 ACUTE CYSTITIS WITH HEMATURIA: ICD-10-CM

## 2023-12-11 ENCOUNTER — TELEPHONE (OUTPATIENT)
Dept: UROLOGY | Facility: CLINIC | Age: 78
End: 2023-12-11
Payer: MEDICARE

## 2023-12-11 ENCOUNTER — HOSPITAL ENCOUNTER (OUTPATIENT)
Dept: RADIOLOGY | Facility: HOSPITAL | Age: 78
Discharge: HOME | End: 2023-12-11
Attending: UROLOGY
Payer: MEDICARE

## 2023-12-11 NOTE — TELEPHONE ENCOUNTER
Rad tech from CT reached out b/c pt arrived for his CT without having taken his prep. Upon review, prep was called into [facility] but [facility] a [facility]. I called [facility] and they stated they use [facility]. I called [facility] to call in the prep but I got their VM. I left  with my callback number.

## 2023-12-12 ENCOUNTER — TELEPHONE (OUTPATIENT)
Dept: UROLOGY | Facility: CLINIC | Age: 78
End: 2023-12-12
Payer: MEDICARE

## 2023-12-12 NOTE — TELEPHONE ENCOUNTER
I called and spoke with Terese at [facility], notified her that I called in prep to [facility], notified her when CT rescheduled for and reviewed instructions for prep. She verbalized understanding of the plan.

## 2023-12-12 NOTE — TELEPHONE ENCOUNTER
I called and spoke with Orville the pharmacist at [facility] (pharmacy for [facility]). I called in benadryl and prednisone, the prep for pt's CT scan due to iodine allergy. I will call and notify pt's nurse at [facility] that this was done. I will notify her of the new date and time of the CT.

## 2023-12-19 ENCOUNTER — HOSPITAL ENCOUNTER (OUTPATIENT)
Dept: RADIOLOGY | Facility: HOSPITAL | Age: 78
Discharge: HOME | End: 2023-12-19
Attending: UROLOGY
Payer: MEDICARE

## 2023-12-19 PROCEDURE — 74178 CT UROGRAM ABD PELVIS W WO: ICD-10-PCS | Mod: 26,,, | Performed by: RADIOLOGY

## 2023-12-19 PROCEDURE — 25500020 PHARM REV CODE 255: Performed by: UROLOGY

## 2023-12-19 PROCEDURE — 74178 CT ABD&PLV WO CNTR FLWD CNTR: CPT | Mod: 26,,, | Performed by: RADIOLOGY

## 2023-12-19 PROCEDURE — 74178 CT ABD&PLV WO CNTR FLWD CNTR: CPT | Mod: TC

## 2023-12-19 RX ADMIN — IOHEXOL 100 ML: 350 INJECTION, SOLUTION INTRAVENOUS at 03:12

## 2023-12-27 ENCOUNTER — TELEPHONE (OUTPATIENT)
Dept: UROLOGY | Facility: CLINIC | Age: 78
End: 2023-12-27
Payer: MEDICARE

## 2023-12-27 NOTE — TELEPHONE ENCOUNTER
----- Message from Margarita Ralph sent at 12/22/2023  7:25 AM CST -----  Regarding: rechedule  Contact: Kayley/[phone] ext [phone]  Kayley/ is requesting a call back regarding rescheduling pt. Procedure.   Would the patient rather a call back or a response via [facility]?  Call   Best Call Back Number:  Kayley/[phone] ext [phone]  Additional Information:

## 2023-12-27 NOTE — TELEPHONE ENCOUNTER
I called Kayley back at the number provided, got her VM. I left her a VM with my callback number and requested call back.

## 2024-01-19 ENCOUNTER — PROCEDURE VISIT (OUTPATIENT)
Dept: UROLOGY | Facility: CLINIC | Age: 79
End: 2024-01-19
Payer: MEDICARE

## 2024-01-19 VITALS
BODY MASS INDEX: 23.11 KG/M2 | HEART RATE: 56 BPM | DIASTOLIC BLOOD PRESSURE: 79 MMHG | HEIGHT: 74 IN | SYSTOLIC BLOOD PRESSURE: 130 MMHG

## 2024-01-19 DIAGNOSIS — R31.0 GROSS HEMATURIA: Primary | ICD-10-CM

## 2024-01-19 DIAGNOSIS — N30.01 ACUTE CYSTITIS WITH HEMATURIA: ICD-10-CM

## 2024-01-19 PROCEDURE — 99214 OFFICE O/P EST MOD 30 MIN: CPT | Mod: S$PBB,,, | Performed by: UROLOGY

## 2024-01-19 NOTE — PROGRESS NOTES
[facility] - Urology   Clinic Note    SUBJECTIVE:     Chief Complaint: Gross Hematuria    Referral from: Zia Valles MD.    History of Present Illness:  Kamar Muñoz is a 78 y.o. male who presents to clinic for evaluation of gross hematuria.    Here for evaluation of gross hematuria.  No recent UTIs. No personal or family history of urologic cancers. Endorses any previous smoking history. Denies a previous history of hematuria. He has not had a hematuria workup performed before. Takes eliquis.    01/19/2024  CT Urogram unremarkable  Patient refusing cystoscopy today    Patient endorses no additional complaints at this time.    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    Hyperlipidemia     Hyperosmolar hyperglycemic state (HHS) 1/29/2022    Hypertension     Kidney stone     Migraine headache     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 08/02/2012    Paroxysmal atrial fibrillation     Pressure sore     STEMI involving right coronary artery 01/09/2022    Type II or unspecified type diabetes mellitus with neurological manifestations, uncontrolled(250.62) 03/08/2013    Urinary tract infection     UTI (urinary tract infection) 2/28/2023       Past Surgical History:   Procedure Laterality Date    BACK SURGERY      CATARACT EXTRACTION W/  INTRAOCULAR LENS IMPLANT Right     Per Dr Romero note 11/2018    COLONOSCOPY N/A 1/28/2019    Procedure: COLONOSCOPY Suprep;  Surgeon: Anh Johnson MD;  Location: [facility];  Service: Endoscopy;  Laterality: N/A;    ESOPHAGOGASTRODUODENOSCOPY N/A 9/15/2022    Procedure: EGD  (ESOPHAGOGASTRODUODENOSCOPY);  Surgeon: Dashawn Evans MD;  Location: [facility] ENDO;  Service: Endoscopy;  Laterality: N/A;    EYE SURGERY      HERNIA REPAIR      INJECTION, SACROILIAC JOINT Left 2023    Procedure: INJECTION,SACROILIAC JOINT, LEFT SI;  Surgeon: Lucas Ramirez MD;  Location: [facility] PAIN MGT;  Service: Pain Management;  Laterality: Left;    KYPHOPLASTY, SPINE, LUMBAR N/A 2023    Procedure: KYPHOPLASTY, SPINE, LUMBAR;  Surgeon: Kimberly Spicer MD;  Location: [facility] OR;  Service: Neurosurgery;  Laterality: N/A;  Ane: Gen  Blood: Type & Hold  Pos: Prone  Rad: C-arm x 2  Spec Equip: MANGO BCN - Tray Cortez    LEFT HEART CATHETERIZATION Left 2022    Procedure: CATHETERIZATION, HEART, LEFT;  Surgeon: Will Hurst III, MD;  Location: [facility] CATH LAB/EP;  Service: Cardiology;  Laterality: Left;    renal stones      SHOULDER OPEN ROTATOR CUFF REPAIR         Family History   Problem Relation Age of Onset    Diabetes Father     Prostate cancer Neg Hx     Kidney disease Neg Hx        Social History     Tobacco Use    Smoking status: Former     Current packs/day: 0.00     Average packs/day: 1.5 packs/day for 25.0 years (37.5 ttl pk-yrs)     Types: Cigarettes     Start date: 1958     Quit date: 1983     Years since quittin.0    Smokeless tobacco: Never   Substance Use Topics    Alcohol use: No    Drug use: No       Current Outpatient Medications on File Prior to Visit   Medication Sig Dispense Refill    acetaminophen (TYLENOL) 500 MG tablet Take 2 tablets (1,000 mg total) by mouth every 8 (eight) hours. (Patient not taking: Reported on 2023) 60 tablet 0    amiodarone (PACERONE) 200 MG Tab Take 1 tablet (200 mg total) by mouth once daily. 90 tablet 3    atorvastatin (LIPITOR) 40 MG tablet Take 1 tablet (40 mg total) by mouth once daily. 90 tablet 3    blood sugar diagnostic Strp 1 strip by Misc.(Non-Drug; Combo Route) route 2 (two) times a day. 200 strip 6    diphenhydrAMINE  "(BENADRYL) 50 MG capsule Take 50mg by mouth 1 hour before contrast administration. 1 capsule 0    ELIQUIS 5 mg Tab Take 5 mg by mouth 2 (two) times daily.      gabapentin (NEURONTIN) 100 MG capsule Take 1 capsule (100 mg total) by mouth 2 (two) times daily. 60 capsule 11    glucose 4 GM chewable tablet Take 6 tablets (24 g total) by mouth as needed for Low blood sugar (< 50).  12    insulin aspart U-100 (NOVOLOG) 100 unit/mL (3 mL) InPn pen Inject 8 Units into the skin 3 (three) times daily with meals. 86.4 mL 0    insulin detemir U-100, Levemir, 100 unit/mL (3 mL) SubQ InPn pen Inject 14 Units into the skin once daily. 4.2 mL 0    lancets (ONETOUCH ULTRASOFT LANCETS) Misc 1 lancet by Misc.(Non-Drug; Combo Route) route 2 (two) times a day. 200 each 6    LANTUS SOLOSTAR U-100 INSULIN glargine 100 units/mL SubQ pen Inject into the skin.      losartan (COZAAR) 25 MG tablet Take 25 mg by mouth once daily.      metFORMIN (GLUCOPHAGE) 500 MG tablet Take 500 mg by mouth 2 (two) times daily.      metoprolol succinate (TOPROL-XL) 25 MG 24 hr tablet Take 25 mg by mouth once daily.      mineral oil (FLEET OIL RETENTION) enema Place 133 mLs (1 enema total) rectally daily as needed for Constipation. 1 enema 0    nitroGLYCERIN (NITROSTAT) 0.4 MG SL tablet Place 1 tablet (0.4 mg total) under the tongue every 5 (five) minutes as needed for Chest pain. 25 tablet 11    ondansetron (ZOFRAN) 4 MG tablet Take 4 mg by mouth every 8 (eight) hours as needed.      pantoprazole (PROTONIX) 40 MG tablet Take 1 tablet (40 mg total) by mouth once daily. 30 tablet 11    pen needle, diabetic (PEN NEEDLE) 30 gauge x 5/16" Ndle 1 Units by Misc.(Non-Drug; Combo Route) route 3 (three) times daily. 100 each 3    polyethylene glycol (GLYCOLAX) 17 gram/dose powder Use cap to measure out (17 g) mix with a liquid and take by mouth once daily. 510 g 2    predniSONE (DELTASONE) 50 MG Tab Take 50mg by mouth at 13 hours, 7 hours, and 1 hour before contrast " "administration. 3 tablet 0    SITagliptin phosphate (JANUVIA) 50 MG Tab Take 1 tablet (50 mg total) by mouth once daily. 30 tablet 2    sucralfate (CARAFATE) 1 gram tablet Take 1 g by mouth 3 (three) times daily.      tamsulosin (FLOMAX) 0.4 mg Cap Take 1 capsule by mouth once daily.      vitamin D (VITAMIN D3) 1000 units Tab Take 1,000 Units by mouth once daily.      [DISCONTINUED] albuterol (PROVENTIL/VENTOLIN HFA) 90 mcg/actuation inhaler Inhale 2 puffs into the lungs every 4 (four) hours as needed for Wheezing (Pt states he will take it on his own. MDI placed by bedside.). Rescue      [DISCONTINUED] blood-glucose meter,continuous (DEXCOM G5 ) Misc 1 Device by Misc.(Non-Drug; Combo Route) route once daily at 6am. 1 each 0    [DISCONTINUED] blood-glucose transmitter (DEXCOM G5 TRANSMITTER) Stephanie 1 Device by Misc.(Non-Drug; Combo Route) route once daily at 6am. 1 each 0    [DISCONTINUED] diltiaZEM (CARDIZEM CD) 120 MG Cp24 Take 1 capsule (120 mg total) by mouth once daily. 90 capsule 3     No current facility-administered medications on file prior to visit.       Review of patient's allergies indicates:   Allergen Reactions    Iodine      Other reaction(s): swelling  Other reaction(s): Itching  Other reaction(s): Rash / IVP       Review of Systems:  A review of 10+ systems was conducted with pertinent positive and negative findings documented in HPI with all other systems reviewed and negative.    OBJECTIVE:     Estimated body mass index is 23.11 kg/m² as calculated from the following:    Height as of this encounter: 6' 2" (1.88 m).    Weight as of 11/29/23: 81.6 kg (180 lb).    Vital Signs (Most Recent)  Vitals:    01/19/24 0918   BP: 130/79   Pulse: (!) 56       Physical Exam:  GENERAL: patient sitting comfortably  HEENT: normocephalic  NECK: supple, no JVD  PULM: normal chest rise, no increased WOB  HEART: non-diaphoretic  ABDO: soft, nondistended, nontender  BACK: no CVA tenderness bilaterally  SKIN: " warm, dry, well perfused  EXT: no bruising or edema  NEURO: grossly normal with no focal deficits  PSYCH: appropriate mood and affect    Genitourinary Exam:  deferred    Lab Results   Component Value Date    BUN 27 (H) 11/29/2023    CREATININE 1.2 11/29/2023    WBC 8.16 11/29/2023    HGB 11.7 (L) 11/29/2023    HCT 35.7 (L) 11/29/2023     11/29/2023    AST 14 11/29/2023    ALT 12 11/29/2023    ALKPHOS 143 (H) 11/29/2023    ALBUMIN 2.9 (L) 11/29/2023    HGBA1C 8.9 (H) 02/28/2023        PSA:  Lab Results   Component Value Date    PSA 0.46 06/20/2012    PSA 0.36 04/27/2012    PSA 0.37 05/02/2011    PSA 0.29 06/03/2009    PSA 0.3 04/23/2008    PSA 0.3 04/30/2007    PSA 0.4 03/31/2006    PSA 0.3 11/04/2004       Imaging:  All relevant imaging studies have been reviewed personally by me.    Results for orders placed or performed during the hospital encounter of 12/08/23 (from the past 2160 hour(s))   CT Urogram Abd Pelvis W WO    Narrative    EXAMINATION:  CT UROGRAM ABD PELVIS W WO    CLINICAL HISTORY:  Hematuria, gross/macroscopic; Acute cystitis with hematuria    TECHNIQUE:  Low dose axial, sagittal and coronal reformations were obtained from the lung bases to the pubic symphysis before and following the IV administration of 100 mL of Omnipaque 350.    COMPARISON:  CT 11/29/2023, 02/27/2023    FINDINGS:  LUNG BASES & MEDIASTINUM (limited):  Stable small left pleural effusion.  Similar-appearing partially imaged left pleural nodularity.  Left basilar subsegmental atelectasis.  Irregular opacities noted.  No focal consolidation.  Centrilobular emphysema.    HEPATOBILIARY: No focal hepatic lesions.No biliary ductal dilatation.Cholelithiasis without cholecystitis.    SPLEEN:  No splenomegaly.    PANCREAS:  No focal masses or ductal dilatation.    ADRENALS:  No adrenal nodules.    KIDNEYS/URETERS: Kidneys are normal in size and location.  Stable multiple bilateral cysts.  Additional subcentimeter hypodensities  bilaterally too small to characterize.  6 mm nonobstructing right-sided nephrolith.  Additional nonobstructing left renal stones.  Symmetric enhancement on excretory phase.  No hydronephrosis.  No solid masses.  Ureters are unremarkable.    PELVIC ORGANS/BLADDER:  Contrast layers in the mildly distended bladder.  Questionable wall thickening posteriorly on the noncontrast images.  Prostate is unremarkable.    PERITONEUM / RETROPERITONEUM:  No free air. No free fluid.    LYMPH NODES:  No lymphadenopathy.    VESSELS:  Aorta tapers normally.  Advanced aortoiliac calcific plaque.    GI TRACT:  No small bowel inflammation or obstruction.  Stable prominent large volume of stool in the rectum.  Rectum is stable in size at 9.2 cm maximal AP diameter.  Similar mild circumferential rectal wall thickening.  No pneumatosis.  Few scattered colonic diverticula.    BONES AND SOFT TISSUES:  Right inguinal hernia noted.  Diffuse osseous demineralization.  Stable, remote compression fracture of the L1 vertebral body with vertebroplasty cement.  No retropulsion.  Stable compression fracture of the L2 vertebral body.  No retropulsion.  Degenerative changes of the spine.      Impression    1. No focal genitourinary abnormality to explain hematuria.  Hypodensities in the kidneys likely cysts.  Questionable posterior bladder wall thickening on the noncontrast images.  Direct visualization may be helpful.  2. Nonobstructing bilateral nephroliths.  3. Stable prominent large volume of stool in the rectum with associated rectal wall thickening.  Findings may represent fecal impaction with possible stercoral colitis.  No pneumatosis.  4. Stable compression fractures of the lumbar spine.  5. Cholelithiasis without cholecystitis.    Electronically signed by resident: Ernesto Lui  Date:    12/19/2023  Time:    16:16    Electronically signed by: Bam Catalan MD  Date:    12/19/2023  Time:    17:39   Results for orders placed or performed  during the hospital encounter of 11/29/23 (from the past 2160 hour(s))   CT Renal Stone Study ABD Pelvis WO    Narrative    EXAMINATION:  CT RENAL STONE STUDY ABD PELVIS WO    CLINICAL HISTORY:  Nephrolithiasis, uncomplicated;    TECHNIQUE:  Multiplanar images were obtained of the abdomen and pelvis from the hemidiaphragms through the symphysis pubis without intravenous contrast.    COMPARISON:  CT of the abdomen and pelvis from 02/27/2023.    FINDINGS:  Lung Bases: There is left pleural nodularity, partially imaged.  There is a small left pleural effusion.  There are mild patchy opacities in the posterior left lower lobe, likely atelectasis.  There are emphysematous changes of the lung bases.    Heart: Heart size is normal.  No pericardial effusion.    Liver: Normal in size and attenuation without focal hepatic lesion.    Biliary tract: No intrahepatic or extrahepatic biliary ductal dilatation.    Gallbladder: There are small layering gallstones.  There is no gallbladder wall thickening or pericholecystic fluid.    Pancreas: Normal. No pancreatic ductal dilatation.    Spleen: Normal size without focal lesion.    Adrenals: Normal.    Kidneys and urinary collecting systems: There are bilateral nonobstructing renal calculi measuring up to 2-3 mm.  There is no hydronephrosis or obstructing ureteral stone.  There are several simple cysts and too small to characterize hypodensities in the bilateral kidneys, stable from prior.  There is mild nonspecific bilateral perinephric fat stranding, likely related to senescent changes and stable from prior.    Lymph nodes: None enlarged.    Stomach and bowel: The stomach is normal.  There is a large volume of stool in the rectum.  The rectum measures approximately 9.2 cm in maximal AP diameter, slightly improved from prior.  There is associated mild rectal wall thickening.  There is no pneumatosis.  There is colonic diverticulosis without evidence of acute diverticulitis.  There is  no bowel obstruction.  The appendix is visualized and is normal.    Peritoneum and mesentery: No ascites or free intraperitoneal air. No abdominal fluid collection.    Vasculature: There is severe atherosclerosis.    Urinary bladder: There is urinary bladder wall thickening.    Reproductive organs: The prostate is not enlarged.    Body wall: No abnormality.    Musculoskeletal: No aggressive osseous lesion.  There is osteopenia.  There are degenerative changes.  There is a remote compression fracture of the L1 vertebral body with vertebroplasty cement in place, with unchanged height loss from prior.  There is a new compression fracture of the L2 vertebral body.  There are multilevel degenerative changes of the spine.      Impression    1. Large volume of rectal stool with rectal wall thickening, suggestive of fecal impaction with possible stercoral colitis.  No pneumatosis.  2. Small left pleural effusion with pleural nodularity.  Malignant effusion not excluded, consider fluid sampling.  3. New compression fracture of the L2 vertebral body, correlate with point tenderness.  4. Nonobstructive bilateral renal calculi.  No hydronephrosis or obstructing stone.  5. Cholelithiasis without acute cholecystitis.  6. Urinary bladder wall thickening, correlate with urinalysis to exclude cystitis.      Electronically signed by: Terry Ralph  Date:    11/29/2023  Time:    20:34     No results found for this or any previous visit (from the past 2160 hour(s)).      ASSESSMENT     1. Gross hematuria    2. Acute cystitis with hematuria        PLAN:     Gross Hematuria    - CT Urogram unremarkable. Patient refused to have cystoscopy performed today  - Follow up PRN    Zia Valles MD  Urology  [facility]    Disclaimer: This note has been generated using voice-recognition software. There may be typographical errors that have been missed during proof-reading.

## 2024-01-23 NOTE — TELEPHONE ENCOUNTER
Spoke to Rafi with [facility] [phone] he reported patient has blood sugar of 600 and has been elevated  for that last few days. Patient was advised by Dr Sheth and Rafi to go to ED to be evaluated.     Subjective     Patient ID: Patient is a 69 year old female      Chief Complaint   Patient presents with   • New Patient   • Thyroid Problem         HPI    69-year-old female presents to the office for evaluation of her thyroid gland after being referred by Dr. Villasenor and Dr. Palla.  She had recent thyroid fine-needle aspirations which were benign.  She does have compressive symptoms and therefore she was referred to discuss potential radiofrequency ablation.    EXAM:US THYROID     CLINICAL HISTORY:   Enlarged thyroid     TECHNIQUE:  Grayscale sonographic views of the thyroid gland were acquired and  submitted for review. Color Doppler interrogation was also performed.     COMPARISON:  None available.     FINDINGS:  There is a homogeneous  background echogenicity. Color Doppler  interrogation demonstrates adequate flow.  Right thyroid lobe measures 6.8 x 3.4 x 4.1 cm .  Left thyroid lobe measures 5.7 x 3.4 x 3.0 cm .   Isthmus measures 0.6 cm .      Multiple cystic/solid isoechoic thyroid nodules.  Index nodules:      R1  Size: 6.2 x 3.2 x 4.0 cm  Location: Mid/inferior  Composition: Mixed Cystic and Solid (1 point)  Echogenicity: Hyper or isoechoic (1 point)  Shape: Wider than tall (0 points)  Margin: Smooth (0 points)  Echogenic Foci: None (0 points)  TIRADS: TR2 (2 points) - not suspicious      L1  Size: 4.3 x 2.6 x 2.7 cm  Location: Mid/superior  Composition: Mixed Cystic and Solid (1 point)  Echogenicity: Hyper or isoechoic (1 point)  Shape: Wider than tall (0 points)  Margin: Smooth (0 points)  Echogenic Foci: None (0 points)  TIRADS: TR2 (2 points) - not suspicious     IMPRESSION:  Enlarged thyroid with multiple nodules, consistent with multinodular  goiter.       Multiple cystic/solid isoechoic thyroid nodules corresponding with  TR2.  No FNA or additional imaging follow-up required.           Recommendations are based on the 2017 ACR Thyroid Imaging, Reporting And  Data System (TI-RADS)       Thank you for  the opportunity to participate in the care of your patient.     Electronically Signed by: CHLOÉ MIRZA M.D.   Signed on: 3/8/2023 10:40 AM   Workstation ID: [id number]     Cytologic Interpretation      A.  Thyroid, right lobe nodule; ultrasound-guided FNA with cell block:  - Adequacy: Satisfactory for evaluation  - Interpretation: Warren II (benign)  - Consistent with benign follicular nodule with Hurthle cell changes, cystic degenerative changes, and a lymphocytic background     B.  Thyroid, left lobe nodule; ultrasound-guided FNA with cell block:  - Adequacy: Satisfactory for evaluation  - Interpretation: Warren II (benign)  - Consistent with benign follicular nodule with cystic degenerative changes      Electronically signed by Grey Ramos MD on 1/9/2024 at 1551     Ruma has a past medical history of Gastroesophageal reflux disease.    She has no past medical history of Difficult intubation, Failed moderate sedation during procedure, Malignant hyperthermia, or PONV (postoperative nausea and vomiting).  Ruma has Chronic obstructive asthma; Vitamin D deficiency; Status post gastric bypass for obesity; Gastroesophageal reflux disease; Postsurgical malabsorption, not elsewhere classified; Osteopenia after menopause; Strain of right knee and leg; Lipoma of back; Abdominal pain, acute, epigastric; Essential hypertension; Obesity (BMI 30-39.9); Stage 3b chronic kidney disease (CKD) (CMD); Pure hypercholesterolemia; Multinodular goiter (nontoxic); Anemia in chronic kidney disease; and Secondary renal hyperparathyroidism (CMD) on their problem list.  Ruma has a past surgical history that includes Bariatric Surgery and Breast biopsy (Right).  Her family history includes Diabetes in her maternal aunt and maternal uncle; Hypertension in her son; Kidney disease in her daughter and mother.  Ruma reports that she has quit smoking. Her smoking use included cigarettes. She has never used smokeless  tobacco. She reports current alcohol use. She reports that she does not use drugs.  Ruma has a current medication list which includes the following prescription(s): hydrochlorothiazide, atorvastatin, and calcium-magnesium-vitamin d.  Ruma   Current Outpatient Medications   Medication Sig Dispense Refill   • hydroCHLOROthiazide (HYDRODIURIL) 12.5 MG tablet TAKE 1 TABLET BY MOUTH EVERY DAY 90 tablet 3   • atorvastatin (LIPITOR) 10 MG tablet TAKE 1 TABLET BY MOUTH EVERY DAY 90 tablet 3   • Calcium-Magnesium-Vitamin D (CALCIUM 1200+D3 PO)        No current facility-administered medications for this visit.     Ruma has No Known Allergies.    Patient Active Problem List   Diagnosis   • Chronic obstructive asthma   • Vitamin D deficiency   • Status post gastric bypass for obesity   • Gastroesophageal reflux disease   • Postsurgical malabsorption, not elsewhere classified   • Osteopenia after menopause   • Strain of right knee and leg   • Lipoma of back   • Abdominal pain, acute, epigastric   • Essential hypertension   • Obesity (BMI 30-39.9)   • Stage 3b chronic kidney disease (CKD) (CMD)   • Pure hypercholesterolemia   • Multinodular goiter (nontoxic)   • Anemia in chronic kidney disease   • Secondary renal hyperparathyroidism (CMD)       Review of Systems   HENT:          Neck pressure.  R>L         Objective   Physical Exam  HENT:      Head: Normocephalic.      Nose: No congestion.   Eyes:      General: No scleral icterus.  Neck:      Comments: 5-6 cm right thyroid nodule slight tracheal deviation to the left.  3 to 4 cm left thyroid nodule.  Cardiovascular:      Rate and Rhythm: Normal rate.   Pulmonary:      Effort: Pulmonary effort is normal.   Abdominal:      General: Abdomen is flat.   Musculoskeletal:         General: Normal range of motion.      Cervical back: Neck supple. No rigidity.   Lymphadenopathy:      Cervical: No cervical adenopathy.   Skin:     General: Skin is warm.   Neurological:      General:  No focal deficit present.      Mental Status: She is alert.   Psychiatric:         Mood and Affect: Mood normal.             Assessment   69-year-old female with grossly 6.2 cm right thyroid nodule with slight tracheal deviation to the left.  She is smaller 3 to 4 cm left thyroid nodule.  Options of observation versus thyroidectomy versus right thyroid lobectomy versus radiofrequency ablation of the right thyroid nodule and then a possible staged approach to the left thyroid nodule depending on how her symptoms are doing discussed.  Pluses and minuses of all approaches discussed.  Possible complications discussed.  Possible vocal cord nerve injury discussed.  Possible hematoma discussed.  Possible infection discussed.Patient would like to proceed with radiofrequency ablation of the compressive right thyroid nodule.    Right thyroid 6.2 cm compressive nodule radiofrequency ablation under ultrasound guidance scheduled for February 16, 2024.

## 2024-02-08 ENCOUNTER — PATIENT OUTREACH (OUTPATIENT)
Dept: ADMINISTRATIVE | Facility: HOSPITAL | Age: 79
End: 2024-02-08
Payer: MEDICAID

## 2024-02-08 NOTE — PROGRESS NOTES
02/08/2024 Gap report audit performed. Care Everywhere updates requested and reviewed  Overdue HM topic chart audit and/or requested. LINKS triggered and reconciled. Media reviewed : Telephone outreach to schedule Annual exam - Pt. Is in a nursing home

## 2024-02-11 NOTE — TELEPHONE ENCOUNTER
Chest x-ray with evidence of emphysema.      Patient is a former smoker .    Follow-up appointment with Pulmonary.    Please contact the patient with appointment.  
Pt informed cxr shows copd, he needs to see pulmonary,  will call him to schedule appt  
12-Feb-2024 18:27

## 2024-02-29 ENCOUNTER — HOSPITAL ENCOUNTER (OUTPATIENT)
Facility: HOSPITAL | Age: 79
Discharge: NURSING, REHAB OR LONG-TERM CARE | End: 2024-03-04
Attending: EMERGENCY MEDICINE | Admitting: STUDENT IN AN ORGANIZED HEALTH CARE EDUCATION/TRAINING PROGRAM
Payer: MEDICARE

## 2024-02-29 DIAGNOSIS — R00.1 BRADYCARDIA: ICD-10-CM

## 2024-02-29 DIAGNOSIS — D64.9 ANEMIA, UNSPECIFIED TYPE: ICD-10-CM

## 2024-02-29 DIAGNOSIS — E83.42 HYPOMAGNESEMIA: ICD-10-CM

## 2024-02-29 DIAGNOSIS — R55 PRE-SYNCOPE: Primary | ICD-10-CM

## 2024-02-29 DIAGNOSIS — E87.6 HYPOKALEMIA: ICD-10-CM

## 2024-02-29 PROBLEM — R06.09 DOE (DYSPNEA ON EXERTION): Status: RESOLVED | Noted: 2019-02-04 | Resolved: 2024-02-29

## 2024-02-29 PROBLEM — E11.649 HYPOGLYCEMIA UNAWARENESS ASSOCIATED WITH TYPE 2 DIABETES MELLITUS: Status: RESOLVED | Noted: 2022-04-08 | Resolved: 2024-02-29

## 2024-02-29 PROBLEM — N30.01 ACUTE CYSTITIS WITH HEMATURIA: Status: ACTIVE | Noted: 2024-02-29

## 2024-02-29 PROBLEM — R19.7 DIARRHEA: Status: ACTIVE | Noted: 2024-02-29

## 2024-02-29 PROBLEM — R41.82 ALTERED MENTAL STATUS: Status: RESOLVED | Noted: 2022-01-25 | Resolved: 2024-02-29

## 2024-02-29 PROBLEM — Z86.010 PERSONAL HISTORY OF COLONIC POLYPS: Chronic | Status: ACTIVE | Noted: 2019-01-28

## 2024-02-29 PROBLEM — Z86.0100 PERSONAL HISTORY OF COLONIC POLYPS: Chronic | Status: ACTIVE | Noted: 2019-01-28

## 2024-02-29 PROBLEM — E86.1 HYPOTENSION DUE TO HYPOVOLEMIA: Status: ACTIVE | Noted: 2024-02-29

## 2024-02-29 LAB
ALBUMIN SERPL BCP-MCNC: 2.4 G/DL (ref 3.5–5.2)
ALP SERPL-CCNC: 106 U/L (ref 55–135)
ALT SERPL W/O P-5'-P-CCNC: 8 U/L (ref 10–44)
ANION GAP SERPL CALC-SCNC: 8 MMOL/L (ref 8–16)
AST SERPL-CCNC: 13 U/L (ref 10–40)
BACTERIA #/AREA URNS AUTO: ABNORMAL /HPF
BASOPHILS # BLD AUTO: 0.04 K/UL (ref 0–0.2)
BASOPHILS NFR BLD: 0.6 % (ref 0–1.9)
BILIRUB SERPL-MCNC: 0.3 MG/DL (ref 0.1–1)
BILIRUB UR QL STRIP: NEGATIVE
BUN SERPL-MCNC: 17 MG/DL (ref 8–23)
CALCIUM SERPL-MCNC: 7.4 MG/DL (ref 8.7–10.5)
CHLORIDE SERPL-SCNC: 110 MMOL/L (ref 95–110)
CLARITY UR REFRACT.AUTO: ABNORMAL
CO2 SERPL-SCNC: 22 MMOL/L (ref 23–29)
COLOR UR AUTO: ABNORMAL
CREAT SERPL-MCNC: 0.9 MG/DL (ref 0.5–1.4)
CREAT UR-MCNC: 170 MG/DL (ref 23–375)
DIFFERENTIAL METHOD BLD: ABNORMAL
EOSINOPHIL # BLD AUTO: 0.1 K/UL (ref 0–0.5)
EOSINOPHIL NFR BLD: 2 % (ref 0–8)
ERYTHROCYTE [DISTWIDTH] IN BLOOD BY AUTOMATED COUNT: 15.7 % (ref 11.5–14.5)
EST. GFR  (NO RACE VARIABLE): >60 ML/MIN/1.73 M^2
ESTIMATED AVG GLUCOSE: 203 MG/DL (ref 68–131)
GLUCOSE SERPL-MCNC: 102 MG/DL (ref 70–110)
GLUCOSE SERPL-MCNC: 172 MG/DL (ref 70–110)
GLUCOSE UR QL STRIP: ABNORMAL
GRAM STN SPEC: NORMAL
GRAM STN SPEC: NORMAL
HBA1C MFR BLD: 8.7 % (ref 4–5.6)
HCT VFR BLD AUTO: 31.7 % (ref 40–54)
HCV AB SERPL QL IA: NORMAL
HGB BLD-MCNC: 9.9 G/DL (ref 14–18)
HGB UR QL STRIP: ABNORMAL
HYALINE CASTS UR QL AUTO: 0 /LPF
IMM GRANULOCYTES # BLD AUTO: 0.02 K/UL (ref 0–0.04)
IMM GRANULOCYTES NFR BLD AUTO: 0.3 % (ref 0–0.5)
KETONES UR QL STRIP: ABNORMAL
LACTATE SERPL-SCNC: 1.9 MMOL/L (ref 0.5–2.2)
LEUKOCYTE ESTERASE UR QL STRIP: ABNORMAL
LYMPHOCYTES # BLD AUTO: 0.8 K/UL (ref 1–4.8)
LYMPHOCYTES NFR BLD: 11.6 % (ref 18–48)
MAGNESIUM SERPL-MCNC: 1.3 MG/DL (ref 1.6–2.6)
MCH RBC QN AUTO: 26.8 PG (ref 27–31)
MCHC RBC AUTO-ENTMCNC: 31.2 G/DL (ref 32–36)
MCV RBC AUTO: 86 FL (ref 82–98)
MICROSCOPIC COMMENT: ABNORMAL
MONOCYTES # BLD AUTO: 0.6 K/UL (ref 0.3–1)
MONOCYTES NFR BLD: 9 % (ref 4–15)
NEUTROPHILS # BLD AUTO: 5 K/UL (ref 1.8–7.7)
NEUTROPHILS NFR BLD: 76.5 % (ref 38–73)
NITRITE UR QL STRIP: NEGATIVE
NRBC BLD-RTO: 0 /100 WBC
OHS QRS DURATION: 110 MS
OHS QTC CALCULATION: 468 MS
PH UR STRIP: 5 [PH] (ref 5–8)
PLATELET # BLD AUTO: 184 K/UL (ref 150–450)
PMV BLD AUTO: 9.8 FL (ref 9.2–12.9)
POTASSIUM SERPL-SCNC: 3 MMOL/L (ref 3.5–5.1)
PROT SERPL-MCNC: 5.8 G/DL (ref 6–8.4)
PROT UR QL STRIP: ABNORMAL
PROT UR-MCNC: 116 MG/DL (ref 0–15)
PROT/CREAT UR: 0.68 MG/G{CREAT} (ref 0–0.2)
RBC # BLD AUTO: 3.7 M/UL (ref 4.6–6.2)
RBC #/AREA URNS AUTO: >100 /HPF (ref 0–4)
SODIUM SERPL-SCNC: 140 MMOL/L (ref 136–145)
SP GR UR STRIP: 1.02 (ref 1–1.03)
TSH SERPL DL<=0.005 MIU/L-ACNC: 1.21 UIU/ML (ref 0.4–4)
URN SPEC COLLECT METH UR: ABNORMAL
WBC # BLD AUTO: 6.57 K/UL (ref 3.9–12.7)
WBC #/AREA URNS AUTO: >100 /HPF (ref 0–5)
WBC CLUMPS UR QL AUTO: ABNORMAL

## 2024-02-29 PROCEDURE — 96367 TX/PROPH/DG ADDL SEQ IV INF: CPT

## 2024-02-29 PROCEDURE — 96366 THER/PROPH/DIAG IV INF ADDON: CPT

## 2024-02-29 PROCEDURE — 86803 HEPATITIS C AB TEST: CPT | Performed by: PHYSICIAN ASSISTANT

## 2024-02-29 PROCEDURE — 80053 COMPREHEN METABOLIC PANEL: CPT | Performed by: NURSE PRACTITIONER

## 2024-02-29 PROCEDURE — 63600175 PHARM REV CODE 636 W HCPCS: Performed by: HOSPITALIST

## 2024-02-29 PROCEDURE — 82962 GLUCOSE BLOOD TEST: CPT | Mod: 91

## 2024-02-29 PROCEDURE — 83735 ASSAY OF MAGNESIUM: CPT | Performed by: NURSE PRACTITIONER

## 2024-02-29 PROCEDURE — 87077 CULTURE AEROBIC IDENTIFY: CPT | Performed by: HOSPITALIST

## 2024-02-29 PROCEDURE — 83036 HEMOGLOBIN GLYCOSYLATED A1C: CPT | Performed by: PHYSICIAN ASSISTANT

## 2024-02-29 PROCEDURE — 25000003 PHARM REV CODE 250: Performed by: HOSPITALIST

## 2024-02-29 PROCEDURE — 87086 URINE CULTURE/COLONY COUNT: CPT | Performed by: NURSE PRACTITIONER

## 2024-02-29 PROCEDURE — 99285 EMERGENCY DEPT VISIT HI MDM: CPT | Mod: 25

## 2024-02-29 PROCEDURE — 81001 URINALYSIS AUTO W/SCOPE: CPT | Performed by: NURSE PRACTITIONER

## 2024-02-29 PROCEDURE — G0378 HOSPITAL OBSERVATION PER HR: HCPCS

## 2024-02-29 PROCEDURE — 84443 ASSAY THYROID STIM HORMONE: CPT | Performed by: NURSE PRACTITIONER

## 2024-02-29 PROCEDURE — 93005 ELECTROCARDIOGRAM TRACING: CPT

## 2024-02-29 PROCEDURE — 87154 CUL TYP ID BLD PTHGN 6+ TRGT: CPT | Performed by: HOSPITALIST

## 2024-02-29 PROCEDURE — 87040 BLOOD CULTURE FOR BACTERIA: CPT | Performed by: HOSPITALIST

## 2024-02-29 PROCEDURE — 87205 SMEAR GRAM STAIN: CPT | Performed by: NURSE PRACTITIONER

## 2024-02-29 PROCEDURE — 85025 COMPLETE CBC W/AUTO DIFF WBC: CPT | Performed by: NURSE PRACTITIONER

## 2024-02-29 PROCEDURE — 83605 ASSAY OF LACTIC ACID: CPT | Performed by: HOSPITALIST

## 2024-02-29 PROCEDURE — 84156 ASSAY OF PROTEIN URINE: CPT | Performed by: NURSE PRACTITIONER

## 2024-02-29 PROCEDURE — 96365 THER/PROPH/DIAG IV INF INIT: CPT

## 2024-02-29 PROCEDURE — 93010 ELECTROCARDIOGRAM REPORT: CPT | Mod: ,,, | Performed by: INTERNAL MEDICINE

## 2024-02-29 PROCEDURE — 63600175 PHARM REV CODE 636 W HCPCS

## 2024-02-29 PROCEDURE — 96368 THER/DIAG CONCURRENT INF: CPT

## 2024-02-29 PROCEDURE — 25000003 PHARM REV CODE 250

## 2024-02-29 RX ORDER — ACETAMINOPHEN 500 MG
500 TABLET ORAL EVERY 8 HOURS
Status: DISCONTINUED | OUTPATIENT
Start: 2024-02-29 | End: 2024-03-04 | Stop reason: HOSPADM

## 2024-02-29 RX ORDER — TALC
6 POWDER (GRAM) TOPICAL NIGHTLY PRN
Status: DISCONTINUED | OUTPATIENT
Start: 2024-02-29 | End: 2024-03-04 | Stop reason: HOSPADM

## 2024-02-29 RX ORDER — LACOSAMIDE 100 MG/1
100 TABLET ORAL EVERY 12 HOURS
Status: DISCONTINUED | OUTPATIENT
Start: 2024-02-29 | End: 2024-03-04 | Stop reason: HOSPADM

## 2024-02-29 RX ORDER — ASCORBIC ACID 500 MG
500 TABLET ORAL 2 TIMES DAILY
Status: DISCONTINUED | OUTPATIENT
Start: 2024-02-29 | End: 2024-03-04 | Stop reason: HOSPADM

## 2024-02-29 RX ORDER — INSULIN LISPRO 100 [IU]/ML
4 INJECTION, SOLUTION INTRAVENOUS; SUBCUTANEOUS
Status: ON HOLD | COMMUNITY
End: 2024-03-20

## 2024-02-29 RX ORDER — NALOXONE HCL 0.4 MG/ML
0.02 VIAL (ML) INJECTION
Status: DISCONTINUED | OUTPATIENT
Start: 2024-02-29 | End: 2024-03-04 | Stop reason: HOSPADM

## 2024-02-29 RX ORDER — PROCHLORPERAZINE EDISYLATE 5 MG/ML
5 INJECTION INTRAMUSCULAR; INTRAVENOUS EVERY 6 HOURS PRN
Status: DISCONTINUED | OUTPATIENT
Start: 2024-02-29 | End: 2024-03-04 | Stop reason: HOSPADM

## 2024-02-29 RX ORDER — MAGNESIUM SULFATE HEPTAHYDRATE 40 MG/ML
2 INJECTION, SOLUTION INTRAVENOUS
Status: COMPLETED | OUTPATIENT
Start: 2024-02-29 | End: 2024-02-29

## 2024-02-29 RX ORDER — LACOSAMIDE 100 MG/1
100 TABLET ORAL EVERY 12 HOURS
COMMUNITY
Start: 2024-02-09

## 2024-02-29 RX ORDER — ONDANSETRON HYDROCHLORIDE 2 MG/ML
4 INJECTION, SOLUTION INTRAVENOUS EVERY 6 HOURS PRN
Status: DISCONTINUED | OUTPATIENT
Start: 2024-02-29 | End: 2024-03-04 | Stop reason: HOSPADM

## 2024-02-29 RX ORDER — ALENDRONATE SODIUM 70 MG/1
70 TABLET ORAL
COMMUNITY
Start: 2024-02-26

## 2024-02-29 RX ORDER — LOSARTAN POTASSIUM 25 MG/1
25 TABLET ORAL DAILY
Status: DISCONTINUED | OUTPATIENT
Start: 2024-03-01 | End: 2024-03-01

## 2024-02-29 RX ORDER — POTASSIUM CHLORIDE 7.45 MG/ML
10 INJECTION INTRAVENOUS
Status: COMPLETED | OUTPATIENT
Start: 2024-02-29 | End: 2024-02-29

## 2024-02-29 RX ORDER — NITROGLYCERIN 0.4 MG/1
0.4 TABLET SUBLINGUAL EVERY 5 MIN PRN
Status: DISCONTINUED | OUTPATIENT
Start: 2024-02-29 | End: 2024-03-04 | Stop reason: HOSPADM

## 2024-02-29 RX ORDER — INSULIN ASPART 100 [IU]/ML
0-5 INJECTION, SOLUTION INTRAVENOUS; SUBCUTANEOUS
Status: DISCONTINUED | OUTPATIENT
Start: 2024-02-29 | End: 2024-03-02

## 2024-02-29 RX ORDER — SUCRALFATE 1 G/1
1 TABLET ORAL
Status: DISCONTINUED | OUTPATIENT
Start: 2024-03-01 | End: 2024-03-04 | Stop reason: HOSPADM

## 2024-02-29 RX ORDER — SODIUM CHLORIDE 0.9 % (FLUSH) 0.9 %
10 SYRINGE (ML) INJECTION EVERY 6 HOURS PRN
Status: DISCONTINUED | OUTPATIENT
Start: 2024-02-29 | End: 2024-03-04 | Stop reason: HOSPADM

## 2024-02-29 RX ORDER — GLUCAGON 1 MG
1 KIT INJECTION
Status: DISCONTINUED | OUTPATIENT
Start: 2024-02-29 | End: 2024-03-04 | Stop reason: HOSPADM

## 2024-02-29 RX ORDER — TRAMADOL HYDROCHLORIDE 50 MG/1
50 TABLET ORAL EVERY 6 HOURS PRN
Status: DISCONTINUED | OUTPATIENT
Start: 2024-02-29 | End: 2024-03-04 | Stop reason: HOSPADM

## 2024-02-29 RX ORDER — POTASSIUM CHLORIDE 20 MEQ/1
40 TABLET, EXTENDED RELEASE ORAL
Status: COMPLETED | OUTPATIENT
Start: 2024-02-29 | End: 2024-02-29

## 2024-02-29 RX ORDER — METOPROLOL TARTRATE 50 MG/1
50 TABLET ORAL DAILY
Status: ON HOLD | COMMUNITY
Start: 2024-02-26 | End: 2024-03-01 | Stop reason: HOSPADM

## 2024-02-29 RX ORDER — AMIODARONE HYDROCHLORIDE 200 MG/1
200 TABLET ORAL DAILY
Status: DISCONTINUED | OUTPATIENT
Start: 2024-03-01 | End: 2024-03-04 | Stop reason: HOSPADM

## 2024-02-29 RX ORDER — IBUPROFEN 200 MG
24 TABLET ORAL
Status: DISCONTINUED | OUTPATIENT
Start: 2024-02-29 | End: 2024-03-04 | Stop reason: HOSPADM

## 2024-02-29 RX ORDER — ASCORBIC ACID 500 MG
500 TABLET ORAL 2 TIMES DAILY
COMMUNITY

## 2024-02-29 RX ORDER — IBUPROFEN 200 MG
16 TABLET ORAL
Status: DISCONTINUED | OUTPATIENT
Start: 2024-02-29 | End: 2024-03-04 | Stop reason: HOSPADM

## 2024-02-29 RX ORDER — ALUMINUM HYDROXIDE, MAGNESIUM HYDROXIDE, AND SIMETHICONE 1200; 120; 1200 MG/30ML; MG/30ML; MG/30ML
30 SUSPENSION ORAL 4 TIMES DAILY PRN
Status: DISCONTINUED | OUTPATIENT
Start: 2024-02-29 | End: 2024-03-04 | Stop reason: HOSPADM

## 2024-02-29 RX ORDER — INSULIN ASPART 100 [IU]/ML
4 INJECTION, SOLUTION INTRAVENOUS; SUBCUTANEOUS
Status: DISCONTINUED | OUTPATIENT
Start: 2024-03-01 | End: 2024-03-02

## 2024-02-29 RX ADMIN — POTASSIUM CHLORIDE 10 MEQ: 7.46 INJECTION, SOLUTION INTRAVENOUS at 05:02

## 2024-02-29 RX ADMIN — MAGNESIUM SULFATE HEPTAHYDRATE 2 G: 40 INJECTION, SOLUTION INTRAVENOUS at 05:02

## 2024-02-29 RX ADMIN — SODIUM CHLORIDE 1000 ML: 9 INJECTION, SOLUTION INTRAVENOUS at 05:02

## 2024-02-29 RX ADMIN — TRAMADOL HYDROCHLORIDE 50 MG: 50 TABLET, COATED ORAL at 08:02

## 2024-02-29 RX ADMIN — ACETAMINOPHEN 500 MG: 500 TABLET ORAL at 09:02

## 2024-02-29 RX ADMIN — Medication 500 MG: at 08:02

## 2024-02-29 RX ADMIN — CEFTRIAXONE 1 G: 1 INJECTION, POWDER, FOR SOLUTION INTRAMUSCULAR; INTRAVENOUS at 08:02

## 2024-02-29 RX ADMIN — LACOSAMIDE 100 MG: 100 TABLET, FILM COATED ORAL at 08:02

## 2024-02-29 RX ADMIN — MAGNESIUM SULFATE HEPTAHYDRATE 2 G: 40 INJECTION, SOLUTION INTRAVENOUS at 06:02

## 2024-02-29 RX ADMIN — POTASSIUM CHLORIDE 40 MEQ: 1500 TABLET, EXTENDED RELEASE ORAL at 08:02

## 2024-02-29 RX ADMIN — APIXABAN 5 MG: 5 TABLET, FILM COATED ORAL at 08:02

## 2024-02-29 RX ADMIN — POTASSIUM CHLORIDE 40 MEQ: 1500 TABLET, EXTENDED RELEASE ORAL at 05:02

## 2024-02-29 NOTE — ED PROVIDER NOTES
"Encounter Date: 2/29/2024       History     Chief Complaint   Patient presents with    Near Syncope     Patient presents from [facility] for evaluation of near syncopal episode. Patient was sitting at lunch table and witnessed by staff to "slump over". Staff sat patient back up and reported that he was alert at that time. EMS reports patient A&OX4 on their arrival but was noted to be bradycardic. Denies chest pain or shortness of breath.      78-year-old male with history of HTN, T2DM, CAD, Afib (on amiodarone and Eliquis) prior embolic stroke presenting here after a presyncopal episode.  Patient reports this morning when he was eating he the "weak." So the nurse at the nursing facility came and assisted him.  When they came and checked his vitals he was found to be hypotensive with bradycardic.  He was assisted to the ground.  EMS was called during that time, he was here Aox4 and reported that he turned "blue." He denies losing consciousness and remembers the event.  He denies any chest pain, palpitation, shortness for breath during the episode.  Does reports of ongoing diarrhea for the past week and reports that other members facility has been dealing with it.  No recent antibiotic use that he knows of.  Denies any dysuria but does report of some mild blood tinge in his urine per the nurse.  He denies any straining prior to the episode.  His bed bound at baseline.    The history is provided by the patient and a friend.     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    Hyperlipidemia     Hyperosmolar hyperglycemic state (HHS) 1/29/2022    Hypertension     Kidney stone     Migraine headache     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 08/02/2012    Paroxysmal atrial fibrillation     Pressure sore     STEMI involving right coronary artery 01/09/2022    Type II or unspecified type diabetes mellitus with neurological manifestations, uncontrolled(250.62) 03/08/2013    Urinary tract infection     UTI (urinary tract infection) 2/28/2023     Past Surgical History:   Procedure Laterality Date    BACK SURGERY      CATARACT EXTRACTION W/  INTRAOCULAR LENS IMPLANT Right     Per Dr Romero note 11/2018    COLONOSCOPY N/A 1/28/2019    Procedure: COLONOSCOPY Suprep;  Surgeon: Anh Johnson MD;  Location: [facility];  Service: Endoscopy;  Laterality: N/A;    ESOPHAGOGASTRODUODENOSCOPY N/A 9/15/2022    Procedure: EGD (ESOPHAGOGASTRODUODENOSCOPY);  Surgeon: Dashawn Evans MD;  Location: [facility];  Service: Endoscopy;  Laterality: N/A;    EYE SURGERY      HERNIA REPAIR      INJECTION, SACROILIAC JOINT Left 9/28/2023    Procedure: INJECTION,SACROILIAC JOINT, LEFT SI;  Surgeon: Lucas Ramirez MD;  Location: [facility] PAIN MGT;  Service: Pain Management;  Laterality: Left;    KYPHOPLASTY, SPINE, LUMBAR N/A 2/1/2023    Procedure: KYPHOPLASTY, SPINE, LUMBAR;  Surgeon: Kimberly Spicer MD;  Location: [facility] OR;  Service: Neurosurgery;  Laterality: N/A;  Ane: Gen  Blood: Type & Hold  Pos: Prone  Rad: C-arm x 2  Spec Equip: Depuy Synthes - Tray Cortez    LEFT HEART CATHETERIZATION Left 1/9/2022    Procedure: CATHETERIZATION, HEART, LEFT;  Surgeon: Will Hurst III, MD;  Location: [facility] CATH LAB/EP;  Service: Cardiology;  Laterality: Left;    renal stones      SHOULDER OPEN ROTATOR CUFF REPAIR       Family History   Problem Relation Age of Onset    Diabetes Father      Prostate cancer Neg Hx     Kidney disease Neg Hx      Social History     Tobacco Use    Smoking status: Former     Current packs/day: 0.00     Average packs/day: 1.5 packs/day for 25.0 years (37.5 ttl pk-yrs)     Types: Cigarettes     Start date: 1958     Quit date: 1983     Years since quittin.1    Smokeless tobacco: Never   Substance Use Topics    Alcohol use: No    Drug use: No     Review of Systems   Constitutional:  Negative for chills and fever.   Respiratory:  Negative for cough and shortness of breath.    Cardiovascular:  Negative for chest pain, palpitations and leg swelling.   Gastrointestinal:  Positive for diarrhea. Negative for nausea and vomiting.   Genitourinary:  Positive for hematuria. Negative for dysuria.       Physical Exam     Initial Vitals [24 1328]   BP Pulse Resp Temp SpO2   (!) 90/46 (!) 50 13 97.7 °F (36.5 °C) 100 %      MAP       --         Physical Exam    Nursing note and vitals reviewed.  Constitutional: He appears well-developed and well-nourished. No distress.   HENT:   Head: Normocephalic and atraumatic.   Mouth/Throat: Mucous membranes are dry.   Eyes: EOM are normal. Pupils are equal, round, and reactive to light.   Neck: No JVD present.   Normal range of motion.  Cardiovascular:  Regular rhythm.   Bradycardia present.   Exam reveals no gallop and no friction rub.       No murmur heard.  Pulmonary/Chest: Breath sounds normal. No respiratory distress. He has no wheezes. He has no rhonchi. He has no rales.   Abdominal: Abdomen is soft. He exhibits no distension. There is no abdominal tenderness. There is no rebound and no guarding.   Musculoskeletal:         General: No tenderness or edema. Normal range of motion.      Cervical back: Normal range of motion.     Neurological: He is alert and oriented to person, place, and time. GCS score is 15. GCS eye subscore is 4. GCS verbal subscore is 5. GCS motor subscore is 6.   Skin: Skin is warm and dry.   Psychiatric: He  has a normal mood and affect.         ED Course   Procedures  Labs Reviewed   MAGNESIUM - Abnormal; Notable for the following components:       Result Value    Magnesium 1.3 (*)     All other components within normal limits    Narrative:     Release to patient->Immediate   COMPREHENSIVE METABOLIC PANEL - Abnormal; Notable for the following components:    Potassium 3.0 (*)     CO2 22 (*)     Glucose 172 (*)     Calcium 7.4 (*)     Total Protein 5.8 (*)     Albumin 2.4 (*)     ALT 8 (*)     All other components within normal limits    Narrative:     Release to patient->Immediate   CBC W/ AUTO DIFFERENTIAL - Abnormal; Notable for the following components:    RBC 3.70 (*)     Hemoglobin 9.9 (*)     Hematocrit 31.7 (*)     MCH 26.8 (*)     MCHC 31.2 (*)     RDW 15.7 (*)     Lymph # 0.8 (*)     Gran % 76.5 (*)     Lymph % 11.6 (*)     All other components within normal limits    Narrative:     Release to patient->Immediate   CLOSTRIDIUM DIFFICILE   HEPATITIS C ANTIBODY    Narrative:     Release to patient->Immediate   TSH    Narrative:     Release to patient->Immediate   URINALYSIS, REFLEX TO URINE CULTURE   WBC, STOOL   ROTAVIRUS ANTIGEN, STOOL   GIARDIA/CRYPTOSPORIDIUM (EIA)   STOOL EXAM-OVA,CYSTS,PARASITES     EKG Readings: (Independently Interpreted)   Initial Reading: No STEMI. Rhythm: Sinus Bradycardia. Heart Rate: 50. ST Segments: Normal ST Segments. T Waves Flipped: II, III, AVF, V3, V4, V5 and V6. Axis: Normal.     ECG Results              EKG 12-lead (Final result)        Collection Time Result Time QRS Duration OHS QTC Calculation    02/29/24 13:39:26 02/29/24 16:50:57 110 468                     Final result by Interface, Lab In [facility] (02/29/24 16:51:05)                   Narrative:    Test Reason : R00.1,    Vent. Rate : 050 BPM     Atrial Rate : 050 BPM     P-R Int : 208 ms          QRS Dur : 110 ms      QT Int : 514 ms       P-R-T Axes : 059 009 -42 degrees     QTc Int : 468 ms    Sinus bradycardia  T  wave abnormality, consider inferior ischemia  T wave abnormality, consider anterolateral ischemia  Abnormal ECG  When compared with ECG of 24-MAR-2023 04:13,  Premature ventricular complexes are no longer Present  Vent. rate has decreased BY  25 BPM  T wave inversion more evident in Inferior leads  T wave inversion less evident in Lateral leads  Confirmed by SAMIR TALBERT MD ([id number]) on 2/29/2024 4:50:52 PM    Referred By: [email or web address]   SELF           Confirmed By:SAMIR TALBERT MD                                  Imaging Results              X-Ray Chest AP Portable (Final result)  Result time 02/29/24 17:16:09      Final result by Azeem Cao MD (02/29/24 17:16:09)                   Impression:      Stable examination.      Electronically signed by: Azeem Cao MD  Date:    02/29/2024  Time:    17:16               Narrative:    EXAMINATION:  XR CHEST AP PORTABLE    CLINICAL HISTORY:  weakness;    TECHNIQUE:  Single frontal view of the chest was performed.    COMPARISON:  03/23/2023.    FINDINGS:  Monitoring EKG leads are present.  There are postoperative changes in the lower thoracic spine.    The trachea is unremarkable.  There are calcifications of the aortic knob.  The cardiomediastinal silhouette is stable.  There is no evidence of free air beneath hemidiaphragms.  There is a small left-sided pleural effusion.  There is no evidence of a pneumothorax.  There is no evidence of pneumomediastinum.  There are bilateral interstitial opacities.  Previous nodule identified the left lung apex remains stable.  There is no focal consolidation.  There are degenerative changes in the osseous structures.                                       Medications   magnesium sulfate 2g in water 50mL IVPB (premix) (has no administration in time range)   potassium chloride SA CR tablet 40 mEq (has no administration in time range)   potassium chloride 10 mEq in 100 mL IVPB (has no administration in time range)   sodium chloride 0.9%  bolus 1,000 mL 1,000 mL (has no administration in time range)     Medical Decision Making  Initial Assessment:  Afebrile, normotensive, bradycardic rate of 52 presenting signs and symptoms of presyncope likely secondary to possible orthostatic versus vasovagal versus bradycardic arrhythmia.  Patient does report of having diarrhea for the past week.  CMP found to have hypokalemia along with hypomagnesemia.  Repleted.  CBC hemoglobin of 9( baseline around 10/9) possible GI bleed versus hematuria that is ongoing.  Chest x-ray is unremarkable. pending UA.  Stool studies ordered.      Differential diagnosis includes but not limited to orthostatics, vasovagal, Alek arrhythmia, UTI, GI bleed, electrolyte abnormality secondary to ongoing diarrhea.           MDM      Amount and/or Complexity of Data Reviewed  Labs: ordered. Decision-making details documented in ED Course.  Radiology: ordered.  ECG/medicine tests: ordered. Decision-making details documented in ED Course.    Risk  Prescription drug management.              Attending Attestation:   Physician Attestation Statement for Resident:  As the supervising MD   Physician Attestation Statement: I have personally seen and examined this patient.   I agree with the above history.  -: Patient with presyncopal episode at the nursing home preceded by lightheadedness.  Reportedly bradycardic and hypotensive.  Here blood pressure remains stable and within normal limits.  He did remain in sinus bradycardia in the 50s.  Denied any symptoms.  He denies any blood in stool but does report recurrent hematuria.  He also admits to 2-3 weeks of diarrhea prior to this episode that multiple other residents of his facility have.  No recent antibiotic use.  On exam he appears dry but nontoxic.  Abdomen is nontender.  Laboratory evaluation revealed hypomagnesemia of 1.3 and hypokalemia of 3.0.  He also has a low hemoglobin of 9.9.  He denies any black or bloody stool but does suffer from  recurrent hematuria.  Will order UA as etiology of that anemia.  No significant dysrhythmias on monitor.  Due to the patient's increased GI losses, do not feel oral replacement but be prudent.  Will order IV replacement of electrolyte derangements.  Continued hydration.  Will send stool studies.  Chest x-ray on my independent interpretation is negative for any acute abnormality.  Will place into observation for electrolyte repletion, telemetry monitoring.   As the supervising MD I agree with the above PE.     As the supervising MD I agree with the above treatment, course, plan, and disposition.                    ED Course as of 02/29/24 1718   Thu Feb 29, 2024   1553 BP(!): 90/46 [SN]   1553 Temp: 97.7 °F (36.5 °C) [SN]   1553 Pulse(!): 50 [SN]   1553 Resp: 13 [SN]   1553 SpO2: 100 % [SN]   1632 CBC Auto Differential(!)  Hemoglobin 9.9. [SN]   1632 Comprehensive Metabolic Panel(!)  Potassium 3.0 repleting [SN]   1633 Magnesium(!)  Hypo magnesium repleting [SN]   1633 TSH  Within normal limits [SN]   1633 EKG 12-lead  Sinus bradycardia rate of 50, normal axis, prolonged VT interval, QTc wnl, no ischemic changes [SN]      ED Course User Index  [SN] Ramses Mendosa,                      78-year-old male here for a presyncopal episode likely secondary to possible bradyarrhythmia secondary to electrolyte derangement due to ongoing diarrhea.  Stool study was sent.  Patient does have history of CAD however no complain of chest pains or palpitations. Planning to admit for observation for presyncopal episode.  1 L flu was given.  Infectious workup has been unremarkable.    Clinical Impression:  Final diagnoses:  [R00.1] Bradycardia  [R55] Pre-syncope (Primary)  [D64.9] Anemia, unspecified type  [E87.6] Hypokalemia  [E83.42] Hypomagnesemia          ED Disposition Condition    Observation Stable                Ramses Mendosa,   Resident  02/29/24 [id number]       Juan C Tay MD  02/29/24 [id number]

## 2024-02-29 NOTE — ED NOTES
Patient identifiers have been checked and are correct.  Patient assisted to ED stretcher and placed in a hospital gown.     LOC: The patient is awake, alert, and aware of environment. The patient is oriented x 3 and speaking appropriately.   APPEARANCE: No acute distress noted.   PSYCHOSOCIAL: Patient is calm and cooperative.   SKIN: The skin is warm, dry  RESPIRATORY: Airway is open and patent. Bilateral chest rise and fall. Respirations are spontaneous, even and unlabored. Normal effort and rate noted. No accessory muscle use noted.   CARDIAC: Patient has a  regular rhythm, bradycardia noted on continuous cardiac monitor.   ABDOMEN: Soft and non tender to palpation. No distention noted.    NEUROLOGIC: Eyes open spontaneously. Speech clear. Tolerating saliva secretions well. Able to follow commands, demonstrating ability to actively and appropriately communicate within context of current conversation. Symmetrical facial muscles. Moving all extremities. Movement is purposeful.   MUSCULOSKELETAL: No obvious deformities noted.     Side rails up x2. Call light within reach. Updated on POC. Daughter at bedside.

## 2024-02-29 NOTE — ED NOTES
Attempted to help patient provide urine specimen but unable to urinate at present time. Pt does not want to have straight cath performed.

## 2024-02-29 NOTE — ED NOTES
Pt unable to stand, reports he does not ambulate at the nursing home. Dr. Mednosa aware- instructed to hold off on orthostatics.

## 2024-03-01 LAB
ACINETOBACTER CALCOACETICUS/BAUMANNII COMPLEX: NOT DETECTED
ALBUMIN SERPL BCP-MCNC: 2.5 G/DL (ref 3.5–5.2)
ANION GAP SERPL CALC-SCNC: 9 MMOL/L (ref 8–16)
ANION GAP SERPL CALC-SCNC: 9 MMOL/L (ref 8–16)
BACTERIA UR CULT: NO GROWTH
BACTEROIDES FRAGILIS: NOT DETECTED
BUN SERPL-MCNC: 14 MG/DL (ref 8–23)
BUN SERPL-MCNC: 14 MG/DL (ref 8–23)
CALCIUM SERPL-MCNC: 8.3 MG/DL (ref 8.7–10.5)
CALCIUM SERPL-MCNC: 8.3 MG/DL (ref 8.7–10.5)
CANDIDA ALBICANS: NOT DETECTED
CANDIDA AURIS: NOT DETECTED
CANDIDA GLABRATA: NOT DETECTED
CANDIDA KRUSEI: NOT DETECTED
CANDIDA PARAPSILOSIS: NOT DETECTED
CANDIDA TROPICALIS: NOT DETECTED
CHLORIDE SERPL-SCNC: 104 MMOL/L (ref 95–110)
CHLORIDE SERPL-SCNC: 104 MMOL/L (ref 95–110)
CO2 SERPL-SCNC: 24 MMOL/L (ref 23–29)
CO2 SERPL-SCNC: 24 MMOL/L (ref 23–29)
CREAT SERPL-MCNC: 1.1 MG/DL (ref 0.5–1.4)
CREAT SERPL-MCNC: 1.1 MG/DL (ref 0.5–1.4)
CRYPTOCOCCUS NEOFORMANS/GATTII: NOT DETECTED
CTX-M GENE (ESBL PRODUCER): NORMAL
ENTEROBACTER CLOACAE COMPLEX: NOT DETECTED
ENTEROBACTERALES: NOT DETECTED
ENTEROCOCCUS FAECALIS: NOT DETECTED
ENTEROCOCCUS FAECIUM: NOT DETECTED
ESCHERICHIA COLI: NOT DETECTED
EST. GFR  (NO RACE VARIABLE): >60 ML/MIN/1.73 M^2
EST. GFR  (NO RACE VARIABLE): >60 ML/MIN/1.73 M^2
GLUCOSE SERPL-MCNC: 189 MG/DL (ref 70–110)
GLUCOSE SERPL-MCNC: 189 MG/DL (ref 70–110)
HAEMOPHILUS INFLUENZAE: NOT DETECTED
IMP GENE (CARBAPENEM RESISTANT): NORMAL
KLEBSIELLA AEROGENES: NOT DETECTED
KLEBSIELLA OXYTOCA: NOT DETECTED
KLEBSIELLA PNEUMONIAE GROUP: NOT DETECTED
KPC RESISTANCE GENE (CARBAPENEM): NORMAL
LISTERIA MONOCYTOGENES: NOT DETECTED
MAGNESIUM SERPL-MCNC: 2 MG/DL (ref 1.6–2.6)
MCR-1: NORMAL
MEC A/C AND MREJ (MRSA): NORMAL
MEC A/C: NORMAL
NDM GENE (CARBAPENEM RESISTANT): NORMAL
NEISSERIA MENINGITIDIS: NOT DETECTED
OXA-48-LIKE (CARBAPENEM RESISTANT): NORMAL
PHOSPHATE SERPL-MCNC: 2.5 MG/DL (ref 2.7–4.5)
POCT GLUCOSE: 102 MG/DL (ref 70–110)
POCT GLUCOSE: 166 MG/DL (ref 70–110)
POCT GLUCOSE: 193 MG/DL (ref 70–110)
POCT GLUCOSE: 224 MG/DL (ref 70–110)
POTASSIUM SERPL-SCNC: 4.3 MMOL/L (ref 3.5–5.1)
POTASSIUM SERPL-SCNC: 4.3 MMOL/L (ref 3.5–5.1)
PROTEUS SPECIES: NOT DETECTED
PSEUDOMONAS AERUGINOSA: NOT DETECTED
SALMONELLA SP: NOT DETECTED
SERRATIA MARCESCENS: NOT DETECTED
SODIUM SERPL-SCNC: 137 MMOL/L (ref 136–145)
SODIUM SERPL-SCNC: 137 MMOL/L (ref 136–145)
STAPHYLOCOCCUS AUREUS: NOT DETECTED
STAPHYLOCOCCUS EPIDERMIDIS: NOT DETECTED
STAPHYLOCOCCUS LUGDUNESIS: NOT DETECTED
STAPHYLOCOCCUS SPECIES: NOT DETECTED
STENOTROPHOMONAS MALTOPHILIA: NOT DETECTED
STREPTOCOCCUS AGALACTIAE: NOT DETECTED
STREPTOCOCCUS PNEUMONIAE: NOT DETECTED
STREPTOCOCCUS PYOGENES: NOT DETECTED
STREPTOCOCCUS SPECIES: NOT DETECTED
VAN A/B (VRE GENE): NORMAL
VIM GENE (CARBAPENEM RESISTANT): NORMAL

## 2024-03-01 PROCEDURE — G0378 HOSPITAL OBSERVATION PER HR: HCPCS

## 2024-03-01 PROCEDURE — 96372 THER/PROPH/DIAG INJ SC/IM: CPT | Performed by: HOSPITALIST

## 2024-03-01 PROCEDURE — 25000003 PHARM REV CODE 250: Performed by: HOSPITALIST

## 2024-03-01 PROCEDURE — 83735 ASSAY OF MAGNESIUM: CPT | Performed by: HOSPITALIST

## 2024-03-01 PROCEDURE — 80069 RENAL FUNCTION PANEL: CPT | Performed by: HOSPITALIST

## 2024-03-01 PROCEDURE — 36415 COLL VENOUS BLD VENIPUNCTURE: CPT | Performed by: HOSPITALIST

## 2024-03-01 PROCEDURE — 96366 THER/PROPH/DIAG IV INF ADDON: CPT

## 2024-03-01 PROCEDURE — 63600175 PHARM REV CODE 636 W HCPCS: Performed by: HOSPITALIST

## 2024-03-01 PROCEDURE — 25000003 PHARM REV CODE 250: Performed by: STUDENT IN AN ORGANIZED HEALTH CARE EDUCATION/TRAINING PROGRAM

## 2024-03-01 RX ORDER — ACETAMINOPHEN 500 MG
500 TABLET ORAL EVERY 8 HOURS
Refills: 0
Start: 2024-03-01

## 2024-03-01 RX ORDER — AMLODIPINE BESYLATE 5 MG/1
5 TABLET ORAL DAILY
Status: DISCONTINUED | OUTPATIENT
Start: 2024-03-01 | End: 2024-03-04 | Stop reason: HOSPADM

## 2024-03-01 RX ORDER — LOSARTAN POTASSIUM 50 MG/1
50 TABLET ORAL DAILY
Status: DISCONTINUED | OUTPATIENT
Start: 2024-03-02 | End: 2024-03-04 | Stop reason: HOSPADM

## 2024-03-01 RX ORDER — HYDRALAZINE HYDROCHLORIDE 50 MG/1
50 TABLET, FILM COATED ORAL EVERY 8 HOURS PRN
Status: DISCONTINUED | OUTPATIENT
Start: 2024-03-01 | End: 2024-03-04 | Stop reason: HOSPADM

## 2024-03-01 RX ADMIN — INSULIN ASPART 2 UNITS: 100 INJECTION, SOLUTION INTRAVENOUS; SUBCUTANEOUS at 09:03

## 2024-03-01 RX ADMIN — Medication 500 MG: at 10:03

## 2024-03-01 RX ADMIN — AMIODARONE HYDROCHLORIDE 200 MG: 200 TABLET ORAL at 09:03

## 2024-03-01 RX ADMIN — LACOSAMIDE 100 MG: 100 TABLET, FILM COATED ORAL at 10:03

## 2024-03-01 RX ADMIN — INSULIN DETEMIR 10 UNITS: 100 INJECTION, SOLUTION SUBCUTANEOUS at 09:03

## 2024-03-01 RX ADMIN — LOSARTAN POTASSIUM 12.5 MG: 25 TABLET, FILM COATED ORAL at 01:03

## 2024-03-01 RX ADMIN — LOSARTAN POTASSIUM 25 MG: 25 TABLET, FILM COATED ORAL at 09:03

## 2024-03-01 RX ADMIN — ACETAMINOPHEN 500 MG: 500 TABLET ORAL at 10:03

## 2024-03-01 RX ADMIN — Medication 500 MG: at 09:03

## 2024-03-01 RX ADMIN — APIXABAN 5 MG: 5 TABLET, FILM COATED ORAL at 10:03

## 2024-03-01 RX ADMIN — SUCRALFATE 1 G: 1 TABLET ORAL at 06:03

## 2024-03-01 RX ADMIN — HYDRALAZINE HYDROCHLORIDE 50 MG: 50 TABLET ORAL at 02:03

## 2024-03-01 RX ADMIN — LACOSAMIDE 100 MG: 100 TABLET, FILM COATED ORAL at 09:03

## 2024-03-01 RX ADMIN — INSULIN ASPART 4 UNITS: 100 INJECTION, SOLUTION INTRAVENOUS; SUBCUTANEOUS at 11:03

## 2024-03-01 RX ADMIN — HYDRALAZINE HYDROCHLORIDE 75 MG: 50 TABLET ORAL at 10:03

## 2024-03-01 RX ADMIN — ACETAMINOPHEN 500 MG: 500 TABLET ORAL at 05:03

## 2024-03-01 RX ADMIN — SUCRALFATE 1 G: 1 TABLET ORAL at 04:03

## 2024-03-01 RX ADMIN — INSULIN ASPART 4 UNITS: 100 INJECTION, SOLUTION INTRAVENOUS; SUBCUTANEOUS at 04:03

## 2024-03-01 RX ADMIN — APIXABAN 5 MG: 5 TABLET, FILM COATED ORAL at 09:03

## 2024-03-01 RX ADMIN — AMLODIPINE BESYLATE 5 MG: 5 TABLET ORAL at 05:03

## 2024-03-01 RX ADMIN — MELATONIN TAB 3 MG 6 MG: 3 TAB at 10:03

## 2024-03-01 RX ADMIN — ACETAMINOPHEN 500 MG: 500 TABLET ORAL at 02:03

## 2024-03-01 RX ADMIN — CEFTRIAXONE 1 G: 1 INJECTION, POWDER, FOR SOLUTION INTRAMUSCULAR; INTRAVENOUS at 10:03

## 2024-03-01 RX ADMIN — INSULIN ASPART 4 UNITS: 100 INJECTION, SOLUTION INTRAVENOUS; SUBCUTANEOUS at 09:03

## 2024-03-01 RX ADMIN — SUCRALFATE 1 G: 1 TABLET ORAL at 11:03

## 2024-03-01 NOTE — HPI
77 y/o WM with CAD, Atrial Fibrillation, hx of Embolic Stroke, Type 2 Diabetes presents to the ED from [facility] [place] for concerns of hypotension, ?pre-syncope, cyanosis.  Per Nursing home note, patient was eating in dining room today when he had c/o of back pain then leaned forward and lips turned cyanotic. Initial vitals pulse Ox 72% T 97.4, HR 72, /88, RR 22,  EMS was called and patient placed on 5Liters oxygen, O2 sat improved to 98%.  His blood glucose was 243.   Transported to the ED.     On arrival to the ED, patient was awake/alert, reported feeling weak, initial VS here hypotensive 90/46 and HR 50 - sinus bradycardia on EKG.  No report of LOC, patient w/o any chest pain, nausea/vomiting, diaphoresis, felt light headed, but denied vision changes.   Over the last week he reports c/o of diarrhea - having 3+ loose bowel movements per day.  His Nursing home MAR has daily miralax listed, he reports going through cycles where bowel regimen causes loose stools but without it he will quickly become constipated.  No reported recent hospitalizations.   He denies any dysuria, but notes nocturia.     ADLs, patient will ambulate with PT and only with assistance, does not use a cane/walker, requires assistance to transfer from bed to wheelchair.  He can feed himself.  He's not aware of all outpatient medication names.     Of Nursing home records only Page 2/3 of MAR available, Page 1 is missing.   
Walk in

## 2024-03-01 NOTE — ASSESSMENT & PLAN NOTE
Chronic, uncontrolled. Latest blood pressure and vitals reviewed-     Temp:  [97.7 °F (36.5 °C)]   Pulse:  [50-58]   Resp:  [13-18]   BP: ()/(46-87)   SpO2:  [95 %-100 %] .   Home meds for hypertension were reviewed and noted below.   Hypertension Medications               nitroGLYCERIN (NITROSTAT) 0.4 MG SL tablet Place 1 tablet (0.4 mg total) under the tongue every 5 (five) minutes as needed for Chest pain.    losartan (COZAAR) 25 MG tablet Take 25 mg by mouth once daily.    metoprolol succinate (TOPROL-XL) 25 MG 24 hr tablet Take 25 mg by mouth once daily.    metoprolol tartrate (LOPRESSOR) 50 MG tablet Take 50 mg by mouth Daily.            While in the hospital, will manage blood pressure as follows; Adjust home antihypertensive regimen as follows- Resume only losartan for now, give losartan 12.5mg x 1 tonight

## 2024-03-01 NOTE — H&P
"  [facility] - Emergency Dept  [facility] Medicine  History & Physical    Patient Name: Kamar Muñoz  MRN: [id number]  Patient Class: OP- Observation  Admission Date: 2/29/2024  Attending Physician: Giovanni Thomas MD   Primary Care Provider: Basim Guerrero MD         Patient information was obtained from patient, past medical records, ER records, and Nursing Home records .     Subjective:     Principal Problem:Pre-syncope    Chief Complaint:   Chief Complaint   Patient presents with    Near Syncope     Patient presents from [facility] for evaluation of near syncopal episode. Patient was sitting at lunch table and witnessed by staff to "slump over". Staff sat patient back up and reported that he was alert at that time. EMS reports patient A&OX4 on their arrival but was noted to be bradycardic. Denies chest pain or shortness of breath.         HPI: 77 y/o WM with CAD, Atrial Fibrillation, hx of Embolic Stroke, Type 2 Diabetes presents to the ED from [facility] for concerns of hypotension, ?pre-syncope, cyanosis.  Per Nursing home note, patient was eating in dining room today when he had c/o of back pain then leaned forward and lips turned cyanotic. Initial vitals pulse Ox 72% T 97.4, HR 72, /88, RR 22,  EMS was called and patient placed on 5Liters oxygen, O2 sat improved to 98%.  His blood glucose was 243.   Transported to the ED.     On arrival to the ED, patient was awake/alert, reported feeling weak, initial VS here hypotensive 90/46 and HR 50 - sinus bradycardia on EKG.  No report of LOC, patient w/o any chest pain, nausea/vomiting, diaphoresis, felt light headed, but denied vision changes.   Over the last week he reports c/o of diarrhea - having 3+ loose bowel movements per day.  His Nursing home MAR has daily miralax listed, he reports going through cycles where bowel regimen causes loose stools but without it he will quickly become constipated.  No reported recent " hospitalizations.   He denies any dysuria, but notes nocturia.     ADLs, patient will ambulate with PT and only with assistance, does not use a cane/walker, requires assistance to transfer from bed to wheelchair.  He can feed himself.  He's not aware of all outpatient medication names.     Of Nursing home records only Page 2/3 of MAR available, Page 1 is missing.     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    Hyperlipidemia     Hyperosmolar hyperglycemic state (HHS) 01/29/2022    Hypertension     Hypoglycemia unawareness associated with type 2 diabetes mellitus 04/08/2022    Kidney stone     Migraine headache     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 08/02/2012    Paroxysmal atrial fibrillation     Pressure sore     STEMI involving right coronary artery 01/09/2022    Type II or unspecified type diabetes mellitus with neurological manifestations, uncontrolled(250.62) 03/08/2013    Urinary tract infection     UTI (urinary tract infection) 02/28/2023       Past Surgical History:   Procedure Laterality Date    BACK SURGERY      CATARACT EXTRACTION W/  INTRAOCULAR LENS IMPLANT Right     Per Dr Romero note 11/2018    COLONOSCOPY N/A 01/28/2019    Procedure: COLONOSCOPY Suprep;  Surgeon: Anh Johnson MD;  Location: [facility];  Service: Endoscopy;  Laterality: N/A;    ESOPHAGOGASTRODUODENOSCOPY N/A 09/15/2022    Procedure: EGD (ESOPHAGOGASTRODUODENOSCOPY);  Surgeon: Dashawn Evans MD;  Location: [facility];  Service: Endoscopy;  Laterality: N/A;    EYE SURGERY      HERNIA REPAIR      INJECTION, SACROILIAC JOINT  Left 09/28/2023    Procedure: INJECTION,SACROILIAC JOINT, LEFT SI;  Surgeon: Lucas Ramirez MD;  Location: [facility] PAIN MGT;  Service: Pain Management;  Laterality: Left;    KYPHOPLASTY, SPINE, LUMBAR N/A 02/01/2023    Procedure: KYPHOPLASTY, SPINE, LUMBAR;  Surgeon: Kimberly Spicer MD;  Location: [facility] OR;  Service: Neurosurgery;  Laterality: N/A;  Ane: GenBlood: Type & HoldPos: ProneRad: C-arm x 2 spec Equip: [employer] - Tray Cortez    LEFT HEART CATHETERIZATION Left 01/09/2022    Procedure: CATHETERIZATION, HEART, LEFT;  Surgeon: Will Hurst III, MD;  Location: [facility] CATH LAB/EP;  Service: Cardiology;  Laterality: Left;    renal stones      SHOULDER OPEN ROTATOR CUFF REPAIR         Review of patient's allergies indicates:   Allergen Reactions    Iodine      Other reaction(s): swelling  Other reaction(s): Itching  Other reaction(s): Rash / IVP       No current facility-administered medications on file prior to encounter.     Current Outpatient Medications on File Prior to Encounter   Medication Sig    acetaminophen (TYLENOL) 500 MG tablet Take 2 tablets (1,000 mg total) by mouth every 8 (eight) hours. (Patient taking differently: Take 500 mg by mouth every 8 (eight) hours.)    amiodarone (PACERONE) 200 MG Tab Take 1 tablet (200 mg total) by mouth once daily.    ascorbic acid, vitamin C, (VITAMIN C) 500 MG tablet Take 500 mg by mouth 2 (two) times daily.    ELIQUIS 5 mg Tab Take 5 mg by mouth 2 (two) times daily.    glucose 4 GM chewable tablet Take 6 tablets (24 g total) by mouth as needed for Low blood sugar (< 50).    insulin lispro 100 unit/mL injection Inject 4 Units into the skin 3 (three) times daily before meals.    lacosamide (VIMPAT) 100 mg Tab Take 100 mg by mouth every 12 (twelve) hours.    nitroGLYCERIN (NITROSTAT) 0.4 MG SL tablet Place 1 tablet (0.4 mg total) under the tongue every 5 (five) minutes as needed for Chest pain.    ondansetron (ZOFRAN) 4 MG tablet Take 4 mg by mouth every 6 (six)  hours as needed for Nausea.    polyethylene glycol (GLYCOLAX) 17 gram/dose powder Use cap to measure out (17 g) mix with a liquid and take by mouth once daily.    sucralfate (CARAFATE) 1 gram tablet Take 1 g by mouth 3 (three) times daily before meals.    alendronate (FOSAMAX) 70 MG tablet Take 70 mg by mouth every 7 days.    atorvastatin (LIPITOR) 40 MG tablet Take 1 tablet (40 mg total) by mouth once daily.    gabapentin (NEURONTIN) 100 MG capsule Take 1 capsule (100 mg total) by mouth 2 (two) times daily.    insulin detemir U-100, Levemir, 100 unit/mL (3 mL) SubQ InPn pen Inject 14 Units into the skin once daily.    LANTUS SOLOSTAR U-100 INSULIN glargine 100 units/mL SubQ pen Inject into the skin.    losartan (COZAAR) 25 MG tablet Take 25 mg by mouth once daily.    metFORMIN (GLUCOPHAGE) 500 MG tablet Take 500 mg by mouth 2 (two) times daily.    metoprolol succinate (TOPROL-XL) 25 MG 24 hr tablet Take 25 mg by mouth once daily.    metoprolol tartrate (LOPRESSOR) 50 MG tablet Take 50 mg by mouth Daily.    pantoprazole (PROTONIX) 40 MG tablet Take 1 tablet (40 mg total) by mouth once daily.    SITagliptin phosphate (JANUVIA) 50 MG Tab Take 1 tablet (50 mg total) by mouth once daily.    tamsulosin (FLOMAX) 0.4 mg Cap Take 1 capsule by mouth once daily.    vitamin D (VITAMIN D3) 1000 units Tab Take 1,000 Units by mouth once daily.    [DISCONTINUED] albuterol (PROVENTIL/VENTOLIN HFA) 90 mcg/actuation inhaler Inhale 2 puffs into the lungs every 4 (four) hours as needed for Wheezing (Pt states he will take it on his own. MDI placed by bedside.). Rescue    [DISCONTINUED] blood sugar diagnostic Strp 1 strip by Misc.(Non-Drug; Combo Route) route 2 (two) times a day.    [DISCONTINUED] blood-glucose meter,continuous (DEXCOM G5 ) Misc 1 Device by Misc.(Non-Drug; Combo Route) route once daily at 6am.    [DISCONTINUED] blood-glucose transmitter (DEXCOM G5 TRANSMITTER) Stephanie 1 Device by Misc.(Non-Drug; Combo Route) route  "once daily at 6am.    [DISCONTINUED] diltiaZEM (CARDIZEM CD) 120 MG Cp24 Take 1 capsule (120 mg total) by mouth once daily.    [DISCONTINUED] diphenhydrAMINE (BENADRYL) 50 MG capsule Take 50mg by mouth 1 hour before contrast administration.    [DISCONTINUED] insulin aspart U-100 (NOVOLOG) 100 unit/mL (3 mL) InPn pen Inject 8 Units into the skin 3 (three) times daily with meals.    [DISCONTINUED] insulin lispro protamin-lispro 100 unit/mL (50-50) InPn Inject 4 Units into the skin 3 (three) times daily before meals.    [DISCONTINUED] lancets (ONETOUCH ULTRASOFT LANCETS) Misc 1 lancet by Misc.(Non-Drug; Combo Route) route 2 (two) times a day.    [DISCONTINUED] mineral oil (FLEET OIL RETENTION) enema Place 133 mLs (1 enema total) rectally daily as needed for Constipation.    [DISCONTINUED] pen needle, diabetic (PEN NEEDLE) 30 gauge x 5/16" Ndle 1 Units by Misc.(Non-Drug; Combo Route) route 3 (three) times daily.    [DISCONTINUED] predniSONE (DELTASONE) 50 MG Tab Take 50mg by mouth at 13 hours, 7 hours, and 1 hour before contrast administration.     Family History       Problem Relation (Age of Onset)    Diabetes Father          Tobacco Use    Smoking status: Former     Current packs/day: 0.00     Average packs/day: 1.5 packs/day for 25.0 years (37.5 ttl pk-yrs)     Types: Cigarettes     Start date: 1958     Quit date: 1983     Years since quittin.1    Smokeless tobacco: Never   Substance and Sexual Activity    Alcohol use: No    Drug use: No    Sexual activity: Yes     Partners: Female     Review of Systems   Constitutional:  Positive for activity change. Negative for appetite change, chills and fever.   HENT: Negative.     Respiratory: Negative.     Cardiovascular: Negative.    Gastrointestinal: Negative.    Neurological:  Positive for weakness and light-headedness. Negative for syncope and speech difficulty.   Psychiatric/Behavioral:  Negative for confusion.      Objective:     Vital Signs (Most " Recent):  Temp: 97.7 °F (36.5 °C) (02/29/24 1328)  Pulse: (!) 53 (02/29/24 2000)  Resp: 15 (02/29/24 2054)  BP: (!) 187/87 (02/29/24 2000)  SpO2: 95 % (02/29/24 2000) Vital Signs (24h Range):  Temp:  [97.7 °F (36.5 °C)] 97.7 °F (36.5 °C)  Pulse:  [50-58] 53  Resp:  [13-18] 15  SpO2:  [95 %-100 %] 95 %  BP: ()/(46-87) 187/87     Weight: 81.6 kg (180 lb)  Body mass index is 23.11 kg/m².     Physical Exam  Vitals and nursing note reviewed.   Constitutional:       General: He is not in acute distress.  HENT:      Mouth/Throat:      Mouth: Mucous membranes are moist.      Pharynx: No oropharyngeal exudate or posterior oropharyngeal erythema.   Eyes:      General: No scleral icterus.     Extraocular Movements: Extraocular movements intact.      Pupils: Pupils are equal, round, and reactive to light.   Cardiovascular:      Rate and Rhythm: Regular rhythm. Bradycardia present.   Pulmonary:      Comments: Limited exam due to debility - CTA in upper lung fields  Abdominal:      General: Bowel sounds are normal. There is no distension.      Palpations: Abdomen is soft.      Tenderness: There is no abdominal tenderness. There is no guarding or rebound.   Musculoskeletal:      Right lower leg: No edema.      Left lower leg: No edema.      Comments: Muscle wasting in LE   Skin:     General: Skin is dry.      Coloration: Skin is pale.   Neurological:      General: No focal deficit present.      Mental Status: He is alert.              CRANIAL NERVES     CN III, IV, VI   Pupils are equal, round, and reactive to light.       Significant Labs: All pertinent labs within the past 24 hours have been reviewed.  CBC:   Recent Labs   Lab 02/29/24  1424   WBC 6.57   HGB 9.9*   HCT 31.7*        CMP:   Recent Labs   Lab 02/29/24  1424      K 3.0*      CO2 22*   *   BUN 17   CREATININE 0.9   CALCIUM 7.4*   PROT 5.8*   ALBUMIN 2.4*   BILITOT 0.3   ALKPHOS 106   AST 13   ALT 8*   ANIONGAP 8     Cardiac Markers: No  "results for input(s): "CKMB", "MYOGLOBIN", "BNP", "TROPISTAT" in the last 48 hours.  Coagulation: No results for input(s): "PT", "INR", "APTT" in the last 48 hours.  Lactic Acid:   Recent Labs   Lab 02/29/24  2047   LACTATE 1.9     Magnesium:   Recent Labs   Lab 02/29/24  1424   MG 1.3*     Troponin: No results for input(s): "TROPONINI", "TROPONINIHS" in the last 48 hours.  Urine Studies:   Recent Labs   Lab 02/29/24  1737   COLORU Ellie   APPEARANCEUA Cloudy*   PHUR 5.0   SPECGRAV 1.025   PROTEINUA 2+*   GLUCUA 1+*   KETONESU Trace*   BILIRUBINUA Negative   OCCULTUA 3+*   NITRITE Negative   LEUKOCYTESUR 3+*   RBCUA >100*   WBCUA >100*   BACTERIA Few*   HYALINECASTS 0       Significant Imaging: I have reviewed all pertinent imaging results/findings within the past 24 hours.    XR CHEST AP PORTABLE     CLINICAL HISTORY:  weakness;     TECHNIQUE:  Single frontal view of the chest was performed.     COMPARISON:  03/23/2023.     FINDINGS:  Monitoring EKG leads are present.  There are postoperative changes in the lower thoracic spine.     The trachea is unremarkable.  There are calcifications of the aortic knob.  The cardiomediastinal silhouette is stable.  There is no evidence of free air beneath hemidiaphragms.  There is a small left-sided pleural effusion.  There is no evidence of a pneumothorax.  There is no evidence of pneumomediastinum.  There are bilateral interstitial opacities.  Previous nodule identified the left lung apex remains stable.  There is no focal consolidation.  There are degenerative changes in the osseous structures.     Impression:     Stable examination.        Electronically signed by: Azeem Cao MD  Date:                                            02/29/2024  Time:                                           17:16           Exam Ended: 02/29/24 16:14 CST           Assessment/Plan:     * Pre-syncope  -etiology based on nursing home records and presenting vitals/symptoms,  patient with transient " hypoxemia cause is unclear and subsequent hypotension.   -Bradycardia present is sinus bradycardia, EKG shows chronic inferolateral T-wave inversions, no new TWI and no ST segment changes, no anginal symptoms - continue on cardiac monitoring   >hold any av maria alejandra agents such as beta blocker/calcium channel blocker at this time  -Given patient cannot stand independently, orthostatic BP deferred, but patient was in seated position to eat during occurrence.   -Hypotension has resolved with 1L fluid bolus      Hypotension due to hypovolemia  Etiology may be due to use of anti-hypertensive medications and hypovolemia from patient's reported diarrhea symptoms over the last week  -hypotension has resolved with 1Liter of Iv fluids tonight   -more recent blood pressures now reading hypertensive      Acute cystitis with hematuria  -will start on empiric therapy for acute cystitis with Iv Ceftriaxone, f/u blood, urine cultures and urine gram stain and modify therapy based on results.   -    -he follows with urology currently and is undergoing outpatient w/u for hematuria      Diarrhea  -Hold scheduled bowel regimen  -Stool studies ordered in ED - add stool culture,  discontinue Ova/Parasites/Giardia/Cryptospodirium studies  -continue with other ordered studies, rule out C.diff         Hypokalemia  Patient has hypokalemia which is Acute and currently uncontrolled. Most recent potassium levels reviewed-   Lab Results   Component Value Date    K 3.0 (L) 02/29/2024   . Will continue potassium replacement per protocol and recheck repeat levels after replacement completed.     Given total 50meq potassium repletion in ED (10meq IV, 40meq PO)   Trend chemistry panels    Bradycardia  -will need updated MAR from [facility] - missing page 1,  amiodarone listed on documentation I have,  Our prior med list shows use of Metoprolol and Diltiazem  -Will hold starting any AV-node blocking agents for now  -patient asymptomatic  "currently  -continue amiodarone.         Hypomagnesemia  Patient has Abnormal Magnesium: hypomagnesemia. Will continue to monitor electrolytes closely. Will replace the affected electrolytes and repeat labs to be done after interventions completed. The patient's magnesium results have been reviewed and are listed below.  Recent Labs   Lab 02/29/24  1424   MG 1.3*      Etiology - suspect related to reported diarrhea    Essential hypertension  Chronic, uncontrolled. Latest blood pressure and vitals reviewed-     Temp:  [97.7 °F (36.5 °C)]   Pulse:  [50-58]   Resp:  [13-18]   BP: ()/(46-87)   SpO2:  [95 %-100 %] .   Home meds for hypertension were reviewed and noted below.   Hypertension Medications               nitroGLYCERIN (NITROSTAT) 0.4 MG SL tablet Place 1 tablet (0.4 mg total) under the tongue every 5 (five) minutes as needed for Chest pain.    losartan (COZAAR) 25 MG tablet Take 25 mg by mouth once daily.    metoprolol succinate (TOPROL-XL) 25 MG 24 hr tablet Take 25 mg by mouth once daily.    metoprolol tartrate (LOPRESSOR) 50 MG tablet Take 50 mg by mouth Daily.            While in the hospital, will manage blood pressure as follows; Adjust home antihypertensive regimen as follows- Resume only losartan for now, give losartan 12.5mg x 1 tonight     Type 2 diabetes mellitus with hyperglycemia, with long-term current use of insulin  Patient's FSGs are uncontrolled due to hyperglycemia on current medication regimen.  Last A1c reviewed-   Lab Results   Component Value Date    HGBA1C 8.7 (H) 02/29/2024     Most recent fingerstick glucose reviewed- No results for input(s): "POCTGLUCOSE" in the last 24 hours.  Current correctional scale  Low  Maintain anti-hyperglycemic dose as follows-   Antihyperglycemics (From admission, onward)      Start     Stop Route Frequency Ordered    03/01/24 0900  insulin detemir U-100 (Levemir) pen 10 Units         -- SubQ Daily 02/29/24 2241    03/01/24 0715  insulin aspart U-100 " pen 4 Units         -- SubQ 3 times daily with meals 02/29/24 2211    02/29/24 2017  insulin aspart U-100 pen 0-5 Units         -- SubQ Before meals & nightly PRN 02/29/24 1918          Hold Oral hypoglycemics while patient is in the hospital.        Paroxysmal atrial fibrillation  Patient with Persistent (7 days or more) atrial fibrillation which is controlled currently with Amiodarone. Patient is currently in sinus rhythm.[id number] Score: 4. HASBLED Score: . Anticoagulation indicated. Anticoagulation done with Apixaban .    History of embolic stroke  Hx of atrial fibrillation  Chronically on apixaban.         VTE Risk Mitigation (From admission, onward)           Ordered     apixaban tablet 5 mg  2 times daily         02/29/24 2015     Reason for No Pharmacological VTE Prophylaxis  Once        Question:  Reasons:  Answer:  Already adequately anticoagulated on oral Anticoagulants    02/29/24 2015     IP VTE HIGH RISK PATIENT  Once         02/29/24 2015     Place sequential compression device  Until discontinued         02/29/24 2015                       On 02/29/2024, patient should be placed in hospital observation services under my care.             Roger Johnston MD  Department of Hospital Medicine  [facility] - Emergency Dept

## 2024-03-01 NOTE — SUBJECTIVE & OBJECTIVE
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    Hyperlipidemia     Hyperosmolar hyperglycemic state (HHS) 01/29/2022    Hypertension     Hypoglycemia unawareness associated with type 2 diabetes mellitus 04/08/2022    Kidney stone     Migraine headache     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 08/02/2012    Paroxysmal atrial fibrillation     Pressure sore     STEMI involving right coronary artery 01/09/2022    Type II or unspecified type diabetes mellitus with neurological manifestations, uncontrolled(250.62) 03/08/2013    Urinary tract infection     UTI (urinary tract infection) 02/28/2023       Past Surgical History:   Procedure Laterality Date    BACK SURGERY      CATARACT EXTRACTION W/  INTRAOCULAR LENS IMPLANT Right     Per Dr Romero note 11/2018    COLONOSCOPY N/A 01/28/2019    Procedure: COLONOSCOPY Suprep;  Surgeon: Anh Johnson MD;  Location: [facility];  Service: Endoscopy;  Laterality: N/A;    ESOPHAGOGASTRODUODENOSCOPY N/A 09/15/2022    Procedure: EGD (ESOPHAGOGASTRODUODENOSCOPY);  Surgeon: Dashawn Evans MD;  Location: [facility];  Service: Endoscopy;  Laterality: N/A;    EYE SURGERY      HERNIA REPAIR      INJECTION, SACROILIAC JOINT Left 09/28/2023    Procedure: INJECTION,SACROILIAC JOINT, LEFT SI;  Surgeon: uLcas Ramirez MD;  [facility];  Service: Pain Management;  Laterality: Left;    KYPHOPLASTY, SPINE, LUMBAR N/A 02/01/2023    Procedure: KYPHOPLASTY, SPINE, LUMBAR;  Surgeon: Kimberly Spicer MD;  Location: [facility] OR;  Service: Neurosurgery;  Laterality: N/A;  Ane: GenBlood: Type &  HoldPos: ProneRad: C-arm x 2 spec Equip: DepSpectrum Mobile Synthes - Tray Cortez    LEFT HEART CATHETERIZATION Left 01/09/2022    Procedure: CATHETERIZATION, HEART, LEFT;  Surgeon: Will Hurst III, MD;  Location: [facility] CATH LAB/EP;  Service: Cardiology;  Laterality: Left;    renal stones      SHOULDER OPEN ROTATOR CUFF REPAIR         Review of patient's allergies indicates:   Allergen Reactions    Iodine      Other reaction(s): swelling  Other reaction(s): Itching  Other reaction(s): Rash / IVP       No current facility-administered medications on file prior to encounter.     Current Outpatient Medications on File Prior to Encounter   Medication Sig    acetaminophen (TYLENOL) 500 MG tablet Take 2 tablets (1,000 mg total) by mouth every 8 (eight) hours. (Patient taking differently: Take 500 mg by mouth every 8 (eight) hours.)    amiodarone (PACERONE) 200 MG Tab Take 1 tablet (200 mg total) by mouth once daily.    ascorbic acid, vitamin C, (VITAMIN C) 500 MG tablet Take 500 mg by mouth 2 (two) times daily.    ELIQUIS 5 mg Tab Take 5 mg by mouth 2 (two) times daily.    glucose 4 GM chewable tablet Take 6 tablets (24 g total) by mouth as needed for Low blood sugar (< 50).    insulin lispro 100 unit/mL injection Inject 4 Units into the skin 3 (three) times daily before meals.    lacosamide (VIMPAT) 100 mg Tab Take 100 mg by mouth every 12 (twelve) hours.    nitroGLYCERIN (NITROSTAT) 0.4 MG SL tablet Place 1 tablet (0.4 mg total) under the tongue every 5 (five) minutes as needed for Chest pain.    ondansetron (ZOFRAN) 4 MG tablet Take 4 mg by mouth every 6 (six) hours as needed for Nausea.    polyethylene glycol (GLYCOLAX) 17 gram/dose powder Use cap to measure out (17 g) mix with a liquid and take by mouth once daily.    sucralfate (CARAFATE) 1 gram tablet Take 1 g by mouth 3 (three) times daily before meals.    alendronate (FOSAMAX) 70 MG tablet Take 70 mg by mouth every 7 days.    atorvastatin (LIPITOR) 40 MG tablet Take  1 tablet (40 mg total) by mouth once daily.    gabapentin (NEURONTIN) 100 MG capsule Take 1 capsule (100 mg total) by mouth 2 (two) times daily.    insulin detemir U-100, Levemir, 100 unit/mL (3 mL) SubQ InPn pen Inject 14 Units into the skin once daily.    LANTUS SOLOSTAR U-100 INSULIN glargine 100 units/mL SubQ pen Inject into the skin.    losartan (COZAAR) 25 MG tablet Take 25 mg by mouth once daily.    metFORMIN (GLUCOPHAGE) 500 MG tablet Take 500 mg by mouth 2 (two) times daily.    metoprolol succinate (TOPROL-XL) 25 MG 24 hr tablet Take 25 mg by mouth once daily.    metoprolol tartrate (LOPRESSOR) 50 MG tablet Take 50 mg by mouth Daily.    pantoprazole (PROTONIX) 40 MG tablet Take 1 tablet (40 mg total) by mouth once daily.    SITagliptin phosphate (JANUVIA) 50 MG Tab Take 1 tablet (50 mg total) by mouth once daily.    tamsulosin (FLOMAX) 0.4 mg Cap Take 1 capsule by mouth once daily.    vitamin D (VITAMIN D3) 1000 units Tab Take 1,000 Units by mouth once daily.    [DISCONTINUED] albuterol (PROVENTIL/VENTOLIN HFA) 90 mcg/actuation inhaler Inhale 2 puffs into the lungs every 4 (four) hours as needed for Wheezing (Pt states he will take it on his own. MDI placed by bedside.). Rescue    [DISCONTINUED] blood sugar diagnostic Strp 1 strip by Misc.(Non-Drug; Combo Route) route 2 (two) times a day.    [DISCONTINUED] blood-glucose meter,continuous (DEXCOM G5 ) Misc 1 Device by Misc.(Non-Drug; Combo Route) route once daily at 6am.    [DISCONTINUED] blood-glucose transmitter (DEXCOM G5 TRANSMITTER) Stephanie 1 Device by Misc.(Non-Drug; Combo Route) route once daily at 6am.    [DISCONTINUED] diltiaZEM (CARDIZEM CD) 120 MG Cp24 Take 1 capsule (120 mg total) by mouth once daily.    [DISCONTINUED] diphenhydrAMINE (BENADRYL) 50 MG capsule Take 50mg by mouth 1 hour before contrast administration.    [DISCONTINUED] insulin aspart U-100 (NOVOLOG) 100 unit/mL (3 mL) InPn pen Inject 8 Units into the skin 3 (three) times daily  "with meals.    [DISCONTINUED] insulin lispro protamin-lispro 100 unit/mL (50-50) InPn Inject 4 Units into the skin 3 (three) times daily before meals.    [DISCONTINUED] lancets (ONETOUCH ULTRASOFT LANCETS) Misc 1 lancet by Misc.(Non-Drug; Combo Route) route 2 (two) times a day.    [DISCONTINUED] mineral oil (FLEET OIL RETENTION) enema Place 133 mLs (1 enema total) rectally daily as needed for Constipation.    [DISCONTINUED] pen needle, diabetic (PEN NEEDLE) 30 gauge x 5/16" Ndle 1 Units by Misc.(Non-Drug; Combo Route) route 3 (three) times daily.    [DISCONTINUED] predniSONE (DELTASONE) 50 MG Tab Take 50mg by mouth at 13 hours, 7 hours, and 1 hour before contrast administration.     Family History       Problem Relation (Age of Onset)    Diabetes Father          Tobacco Use    Smoking status: Former     Current packs/day: 0.00     Average packs/day: 1.5 packs/day for 25.0 years (37.5 ttl pk-yrs)     Types: Cigarettes     Start date: 1958     Quit date: 1983     Years since quittin.1    Smokeless tobacco: Never   Substance and Sexual Activity    Alcohol use: No    Drug use: No    Sexual activity: Yes     Partners: Female     Review of Systems   Constitutional:  Positive for activity change. Negative for appetite change, chills and fever.   HENT: Negative.     Respiratory: Negative.     Cardiovascular: Negative.    Gastrointestinal: Negative.    Neurological:  Positive for weakness and light-headedness. Negative for syncope and speech difficulty.   Psychiatric/Behavioral:  Negative for confusion.      Objective:     Vital Signs (Most Recent):  Temp: 97.7 °F (36.5 °C) (24 1328)  Pulse: (!) 53 (24)  Resp: 15 (24)  BP: (!) 187/87 (24)  SpO2: 95 % (24) Vital Signs (24h Range):  Temp:  [97.7 °F (36.5 °C)] 97.7 °F (36.5 °C)  Pulse:  [50-58] 53  Resp:  [13-18] 15  SpO2:  [95 %-100 %] 95 %  BP: ()/(46-87) 187/87     Weight: 81.6 kg (180 lb)  Body mass index " "is 23.11 kg/m².     Physical Exam  Vitals and nursing note reviewed.   Constitutional:       General: He is not in acute distress.  HENT:      Mouth/Throat:      Mouth: Mucous membranes are moist.      Pharynx: No oropharyngeal exudate or posterior oropharyngeal erythema.   Eyes:      General: No scleral icterus.     Extraocular Movements: Extraocular movements intact.      Pupils: Pupils are equal, round, and reactive to light.   Cardiovascular:      Rate and Rhythm: Regular rhythm. Bradycardia present.   Pulmonary:      Comments: Limited exam due to debility - CTA in upper lung fields  Abdominal:      General: Bowel sounds are normal. There is no distension.      Palpations: Abdomen is soft.      Tenderness: There is no abdominal tenderness. There is no guarding or rebound.   Musculoskeletal:      Right lower leg: No edema.      Left lower leg: No edema.      Comments: Muscle wasting in LE   Skin:     General: Skin is dry.      Coloration: Skin is pale.   Neurological:      General: No focal deficit present.      Mental Status: He is alert.              CRANIAL NERVES     CN III, IV, VI   Pupils are equal, round, and reactive to light.       Significant Labs: All pertinent labs within the past 24 hours have been reviewed.  CBC:   Recent Labs   Lab 02/29/24  1424   WBC 6.57   HGB 9.9*   HCT 31.7*        CMP:   Recent Labs   Lab 02/29/24  1424      K 3.0*      CO2 22*   *   BUN 17   CREATININE 0.9   CALCIUM 7.4*   PROT 5.8*   ALBUMIN 2.4*   BILITOT 0.3   ALKPHOS 106   AST 13   ALT 8*   ANIONGAP 8     Cardiac Markers: No results for input(s): "CKMB", "MYOGLOBIN", "BNP", "TROPISTAT" in the last 48 hours.  Coagulation: No results for input(s): "PT", "INR", "APTT" in the last 48 hours.  Lactic Acid:   Recent Labs   Lab 02/29/24  2047   LACTATE 1.9     Magnesium:   Recent Labs   Lab 02/29/24  1424   MG 1.3*     Troponin: No results for input(s): "TROPONINI", "TROPONINIHS" in the last 48 " hours.  Urine Studies:   Recent Labs   Lab 02/29/24  1737   COLORU Ellie   APPEARANCEUA Cloudy*   PHUR 5.0   SPECGRAV 1.025   PROTEINUA 2+*   GLUCUA 1+*   KETONESU Trace*   BILIRUBINUA Negative   OCCULTUA 3+*   NITRITE Negative   LEUKOCYTESUR 3+*   RBCUA >100*   WBCUA >100*   BACTERIA Few*   HYALINECASTS 0       Significant Imaging: I have reviewed all pertinent imaging results/findings within the past 24 hours.    XR CHEST AP PORTABLE     CLINICAL HISTORY:  weakness;     TECHNIQUE:  Single frontal view of the chest was performed.     COMPARISON:  03/23/2023.     FINDINGS:  Monitoring EKG leads are present.  There are postoperative changes in the lower thoracic spine.     The trachea is unremarkable.  There are calcifications of the aortic knob.  The cardiomediastinal silhouette is stable.  There is no evidence of free air beneath hemidiaphragms.  There is a small left-sided pleural effusion.  There is no evidence of a pneumothorax.  There is no evidence of pneumomediastinum.  There are bilateral interstitial opacities.  Previous nodule identified the left lung apex remains stable.  There is no focal consolidation.  There are degenerative changes in the osseous structures.     Impression:     Stable examination.        Electronically signed by: Azeem Cao MD  Date:                                            02/29/2024  Time:                                           17:16           Exam Ended: 02/29/24 16:14 CST

## 2024-03-01 NOTE — PLAN OF CARE
Nursing Home Orders sent to [facility] via [facility].  Will continue to update plan as needed.  James Strong RN,BSN

## 2024-03-01 NOTE — ASSESSMENT & PLAN NOTE
-etiology based on nursing home records and presenting vitals/symptoms,  patient with transient hypoxemia cause is unclear and subsequent hypotension.   -Bradycardia present is sinus bradycardia, EKG shows chronic inferolateral T-wave inversions, no new TWI and no ST segment changes, no anginal symptoms - continue on cardiac monitoring   >hold any av maria alejandra agents such as beta blocker/calcium channel blocker at this time  -Given patient cannot stand independently, orthostatic BP deferred, but patient was in seated position to eat during occurrence.   -Hypotension has resolved with 1L fluid bolus

## 2024-03-01 NOTE — ASSESSMENT & PLAN NOTE
Patient with Persistent (7 days or more) atrial fibrillation which is controlled currently with Amiodarone. Patient is currently in sinus rhythm.[id number] Score: 4. HASBLED Score: . Anticoagulation indicated. Anticoagulation done with Apixaban .

## 2024-03-01 NOTE — ED NOTES
Telemetry Verification   Patient placed on Telemetry Box  Verified with War Room  Box # [id number]   Monitor Tech    Rate    Rhythm

## 2024-03-01 NOTE — NURSING
Nurses Note -- 4 Eyes      3/1/2024   2:39 AM      Skin assessed during: Admit      [] No Altered Skin Integrity Present    []Prevention Measures Documented      [x] Yes- Altered Skin Integrity Present or Discovered   [x] LDA Added if Not in Epic (Describe Wound)   [x] New Altered Skin Integrity was Present on Admit and Documented in LDA   [x] Wound Image Taken    Wound Care Consulted? Yes    Attending Nurse:  Dorys Castillo RN/Staff Member:  francis roberto

## 2024-03-01 NOTE — PLAN OF CARE
[facility] 11H  Discharge Assessment    Primary Care Provider: Basim Guerrero MD     Discharge Assessment (most recent)       BRIEF DISCHARGE ASSESSMENT - 03/01/24 1035          Discharge Planning    Assessment Type Discharge Planning Brief Assessment     Resource/Environmental Concerns none     Support Systems Family members     Equipment Currently Used at Home wheelchair     Current Living Arrangements residential facility     Care Facility Name [facility]     Patient/Family Anticipates Transition to long-term care facility     Patient/Family Anticipated Services at Transition none     DME Needed Upon Discharge  none     Discharge Plan A Return to nursing home     Discharge Plan B Return to Nursing Home                   Pt is a 78 y.o. male admitted with Pre-Syncope and has a PMH of  CAD, AFib abd embolic stroke. He is a resident at [facility] and will be returning. He requires assistance with transfers, bathing and dressing. [facility] Discharge Packet given to patient and/or family with understanding verbalized.   name and number and estimated discharge date written on white board in patient's room with request to call for any questions or concerns.  Will continue to follow for needs. Discharge Plan A and Plan B have been determined by review of patient's clinical status, future medical and therapeutic needs, and coverage/benefits for post-acute care in coordination with multidisciplinary team members.    James Strong RN,BSN

## 2024-03-01 NOTE — NURSING
Patient transferred to bed from stretcher, tolerated well. Patient full assessment completed, see chart. Patient hines shave open area on coccyx, meplex placed. POC reviewed with patient, patient verbalized understanding. Call system/ fall protocol reviewed, patient verbalized understanding. Bed alarm on, bed lowest position / call light with in reach. Patient denies any other needs at this time.

## 2024-03-01 NOTE — PLAN OF CARE
[facility]    FACILITY TRANSFER ORDERS      Patient Name: Kamar Muñoz  YOB: 1945    PCP: Basim Guerrero MD   PCP Address: [address] [place] [address] / [place] [address]  PCP Phone Number: [phone]  PCP Fax: [phone]    Encounter Date: 03/01/2024    Admit to: Nursing Home    Vital Signs:  Routine    Diagnoses:   Active Hospital Problems    Diagnosis  POA    *Pre-syncope [R55]  Yes    Hypotension due to hypovolemia [I95.89, E86.1]  Yes    Hypokalemia [E87.6]  Yes    Hypomagnesemia [E83.42]  Yes    Bradycardia [R00.1]  Yes    Diarrhea [R19.7]  Yes    Acute cystitis with hematuria [N30.01]  Yes    Paroxysmal atrial fibrillation [I48.0]  Yes     Chronic    History of embolic stroke [Z86.73]  Not Applicable     Chronic    Coronary artery disease involving native coronary artery of native heart with unstable angina pectoris [I25.110]  Yes     Chronic    Type 2 diabetes mellitus with hyperglycemia, with long-term current use of insulin [E11.65, Z79.4]  Not Applicable     Chronic    Essential hypertension [I10]  Yes     Chronic      Resolved Hospital Problems   No resolved problems to display.       Allergies:  Review of patient's allergies indicates:   Allergen Reactions    Iodine      Other reaction(s): swelling  Other reaction(s): Itching  Other reaction(s): Rash / IVP       Diet: diabetic diet: 2000 calorie    Activities: Bathroom privileges with assistance    Goals of Care Treatment Preferences:  Code Status: Full Code      Nursing: Per facility     Labs:  None   needed      CONSULTS:    Physical Therapy to evaluate and treat. , Occupational Therapy to evaluate and treat., and  to evaluate for community resources/long-range planning.    MISCELLANEOUS CARE:  Routine Skin for Bedridden Patients: Apply moisture barrier cream to all skin folds and wet areas in perineal area daily and after baths and all bowel movements. and Diabetes Care:   SN to perform and educate  Diabetic management with blood glucose monitoring:, Fingerstick blood sugar AC and HS, and Report CBG < 60 or > 350 to physician.    WOUND CARE ORDERS  None    Medications: Review discharge medications with patient and family and provide education.      Current Discharge Medication List        CONTINUE these medications which have CHANGED    Details   acetaminophen (TYLENOL) 500 MG tablet Take 1 tablet (500 mg total) by mouth every 8 (eight) hours.  Refills: 0           CONTINUE these medications which have NOT CHANGED    Details   amiodarone (PACERONE) 200 MG Tab Take 1 tablet (200 mg total) by mouth once daily.  Qty: 90 tablet, Refills: 3      ascorbic acid, vitamin C, (VITAMIN C) 500 MG tablet Take 500 mg by mouth 2 (two) times daily.      ELIQUIS 5 mg Tab Take 5 mg by mouth 2 (two) times daily.      glucose 4 GM chewable tablet Take 6 tablets (24 g total) by mouth as needed for Low blood sugar (< 50).  Refills: 12      insulin lispro 100 unit/mL injection Inject 4 Units into the skin 3 (three) times daily before meals.      lacosamide (VIMPAT) 100 mg Tab Take 100 mg by mouth every 12 (twelve) hours.      nitroGLYCERIN (NITROSTAT) 0.4 MG SL tablet Place 1 tablet (0.4 mg total) under the tongue every 5 (five) minutes as needed for Chest pain.  Qty: 25 tablet, Refills: 11      ondansetron (ZOFRAN) 4 MG tablet Take 4 mg by mouth every 6 (six) hours as needed for Nausea.      polyethylene glycol (GLYCOLAX) 17 gram/dose powder Use cap to measure out (17 g) mix with a liquid and take by mouth once daily.  Qty: 510 g, Refills: 2      sucralfate (CARAFATE) 1 gram tablet Take 1 g by mouth 3 (three) times daily before meals.      alendronate (FOSAMAX) 70 MG tablet Take 70 mg by mouth every 7 days.      atorvastatin (LIPITOR) 40 MG tablet Take 1 tablet (40 mg total) by mouth once daily.  Qty: 90 tablet, Refills: 3      insulin detemir U-100, Levemir, 100 unit/mL (3 mL) SubQ InPn pen Inject 14 Units into the skin once  daily.  Qty: 4.2 mL, Refills: 0      LANTUS SOLOSTAR U-100 INSULIN glargine 100 units/mL SubQ pen Inject into the skin.      losartan (COZAAR) 25 MG tablet Take 25 mg by mouth once daily.      metFORMIN (GLUCOPHAGE) 500 MG tablet Take 500 mg by mouth 2 (two) times daily.      pantoprazole (PROTONIX) 40 MG tablet Take 1 tablet (40 mg total) by mouth once daily.  Qty: 30 tablet, Refills: 11      SITagliptin phosphate (JANUVIA) 50 MG Tab Take 1 tablet (50 mg total) by mouth once daily.  Qty: 30 tablet, Refills: 2      tamsulosin (FLOMAX) 0.4 mg Cap Take 1 capsule by mouth once daily.      vitamin D (VITAMIN D3) 1000 units Tab Take 1,000 Units by mouth once daily.           STOP taking these medications       gabapentin (NEURONTIN) 100 MG capsule Comments:   Reason for Stopping:         metoprolol succinate (TOPROL-XL) 25 MG 24 hr tablet Comments:   Reason for Stopping:         metoprolol tartrate (LOPRESSOR) 50 MG tablet Comments:   Reason for Stopping:                  Immunizations Administered as of 3/1/2024       Name Date Dose VIS Date Route Exp Date    COVID-19, MRNA, LN-S, PF (Pfizer) (Purple Cap) 10/29/2021 0.3 mL 5/10/2021 Intramuscular --    Site: Left arm     : Pf[employer] Inc     Lot: [id number]     Comment: Adminis     COVID-19, MRNA, LN-S, PF (Pfizer) (Purple Cap) 1/29/2021 0.3 mL 1/5/2021 Intramuscular --    Site: Left deltoid     : Pf[employer] Inc     Lot: [id number]     Comment: Adminis             This patient has had both covid vaccinations    Some patients may experience side effects after vaccination.  These may include fever, headache, muscle or joint aches.  Most symptoms resolve with 24-48 hours and do not require urgent medical evaluation unless they persist for more than 72 hours or symptoms are concerning for an unrelated medical condition.          _________________________________  Giovanni Thomas MD  03/01/2024

## 2024-03-01 NOTE — ASSESSMENT & PLAN NOTE
Patient has hypokalemia which is Acute and currently uncontrolled. Most recent potassium levels reviewed-   Lab Results   Component Value Date    K 3.0 (L) 02/29/2024   . Will continue potassium replacement per protocol and recheck repeat levels after replacement completed.     Given total 50meq potassium repletion in ED (10meq IV, 40meq PO)   Trend chemistry panels

## 2024-03-01 NOTE — ASSESSMENT & PLAN NOTE
"Patient's FSGs are uncontrolled due to hyperglycemia on current medication regimen.  Last A1c reviewed-   Lab Results   Component Value Date    HGBA1C 8.7 (H) 02/29/2024     Most recent fingerstick glucose reviewed- No results for input(s): "POCTGLUCOSE" in the last 24 hours.  Current correctional scale  Low  Maintain anti-hyperglycemic dose as follows-   Antihyperglycemics (From admission, onward)      Start     Stop Route Frequency Ordered    03/01/24 0715  insulin aspart U-100 pen 4 Units         -- SubQ 3 times daily with meals 02/29/24 2211 02/29/24 2315  insulin detemir U-100 (Levemir) pen 10 Units         -- SubQ Nightly 02/29/24 2211 02/29/24 2017  insulin aspart U-100 pen 0-5 Units         -- SubQ Before meals & nightly PRN 02/29/24 1918          Hold Oral hypoglycemics while patient is in the hospital.      "

## 2024-03-01 NOTE — CONSULTS
[facility] - Observ[facility]  Wound Care    Patient Name:  Kamar Muñoz   MRN:  [id number]  Date: 3/1/2024  Diagnosis: Pre-syncope    History:     Past Medical History:   Diagnosis Date    Anticoagulant long-term use     Arthritis     At high risk for falls     hx stroke-lt sided weakness    Bacterial pneumonia 2022    Colon polyp     Coronary artery disease     COVID-19 virus infection 2022    Depression     Diabetes mellitus type II     Diabetic ketoacidosis without coma associated with type 2 diabetes mellitus     Embolic stroke involving left cerebellar artery 2022    Hyperlipidemia     Hyperosmolar hyperglycemic state (HHS) 2022    Hypertension     Hypoglycemia unawareness associated with type 2 diabetes mellitus 2022    Kidney stone     Migraine headache     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 2012    Paroxysmal atrial fibrillation     Pressure sore     STEMI involving right coronary artery 2022    Type II or unspecified type diabetes mellitus with neurological manifestations, uncontrolled(250.62) 2013    Urinary tract infection     UTI (urinary tract infection) 2023       Social History     Socioeconomic History    Marital status:    Tobacco Use    Smoking status: Former     Current packs/day: 0.00     Average packs/day: 1.5 packs/day for 25.0 years (37.5 ttl pk-yrs)     Types: Cigarettes     Start date: 1958     Quit date: 1983     Years since quittin.1    Smokeless tobacco: Never   Substance and Sexual Activity    Alcohol use: No    Drug use: No    Sexual activity: Yes     Partners: Female   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     Social Determinants of Health     Financial Resource Strain: Low Risk  (3/23/2023)    Overall Financial Resource Strain (CARDIA)     Difficulty of Paying Living Expenses: Not hard at all   Food Insecurity: No Food Insecurity (2022)    Hunger Vital  Sign     Worried About Running Out of Food in the Last Year: Never true     Ran Out of Food in the Last Year: Never true   Transportation Needs: Unknown (3/23/2023)    PRAPARE - Transportation     Lack of Transportation (Medical): No     Lack of Transportation (Non-Medical): Patient declined   Physical Activity: Patient Declined (3/23/2023)    Exercise Vital Sign     Days of Exercise per Week: Patient declined     Minutes of Exercise per Session: Patient declined   Social Connections: Unknown (3/23/2023)    Social Connection and Isolation Panel [NHANES]     Marital Status:    Housing Stability: High Risk (3/23/2023)    Housing Stability Vital Sign     Unable to Pay for Housing in the Last Year: Yes     Number of Places Lived in the Last Year: 1     Unstable Housing in the Last Year: No       Precautions:     Allergies as of 02/29/2024 - Reviewed 02/29/2024   Allergen Reaction Noted    Iodine  07/20/2012       WO Assessment Details/Treatment     Patient refuses inpatient wound care. Primary RN notified. Please re-consult if needed.       03/01/24 0920   WOCN Assessment   WOCN Total Time (mins) 15   Visit Date 03/01/24   Visit Time 0920   Consult Type New   WOCN Speciality Wound   Intervention chart review   Teaching on-going         Tray Garcia RN  03/01/2024

## 2024-03-01 NOTE — PLAN OF CARE
Problem: Adult Inpatient Plan of Care  Goal: Plan of Care Review  Outcome: Ongoing, Progressing  Goal: Patient-Specific Goal (Individualized)  Outcome: Ongoing, Progressing  Goal: Absence of Hospital-Acquired Illness or Injury  Outcome: Ongoing, Progressing  Goal: Optimal Comfort and Wellbeing  Outcome: Ongoing, Progressing  Goal: Readiness for Transition of Care  Outcome: Ongoing, Progressing     Problem: Adjustment to Illness (Delirium)  Goal: Optimal Coping  Outcome: Ongoing, Progressing     Problem: Altered Behavior (Delirium)  Goal: Improved Behavioral Control  Outcome: Ongoing, Progressing     Problem: Attention and Thought Clarity Impairment (Delirium)  Goal: Improved Attention and Thought Clarity  Outcome: Ongoing, Progressing     Problem: Sleep Disturbance (Delirium)  Goal: Improved Sleep  Outcome: Ongoing, Progressing     Problem: Infection  Goal: Absence of Infection Signs and Symptoms  Outcome: Ongoing, Progressing     Problem: Diabetes Comorbidity  Goal: Blood Glucose Level Within Targeted Range  Outcome: Ongoing, Progressing     Problem: Impaired Wound Healing  Goal: Optimal Wound Healing  Outcome: Ongoing, Progressing     Problem: Fall Injury Risk  Goal: Absence of Fall and Fall-Related Injury  Outcome: Ongoing, Progressing     Problem: Hypertension Acute  Goal: Blood Pressure Within Desired Range  Outcome: Ongoing, Progressing     Problem: Adjustment to Device (Cardiac Rhythm Management Device)  Goal: Optimal Adjustment to Device  Outcome: Ongoing, Progressing     Problem: Bleeding (Cardiac Rhythm Management Device)  Goal: Absence of Bleeding  Outcome: Ongoing, Progressing     Problem: Device-Related Complication Risk (Cardiac Rhythm Management Device)  Goal: Effective Device Function  Outcome: Ongoing, Progressing     Problem: Infection (Cardiac Rhythm Management Device)  Goal: Absence of Infection Signs and Symptoms  Outcome: Ongoing, Progressing     Problem: Pain (Cardiac Rhythm Management  Device)  Goal: Acceptable Pain Level  Outcome: Ongoing, Progressing     Problem: Respiratory Compromise (Cardiac Rhythm Management Device)  Goal: Effective Oxygenation and Ventilation  Outcome: Ongoing, Progressing     Problem: Syncope  Goal: Absence of Syncopal Symptoms  Outcome: Ongoing, Progressing

## 2024-03-01 NOTE — ASSESSMENT & PLAN NOTE
Patient has Abnormal Magnesium: hypomagnesemia. Will continue to monitor electrolytes closely. Will replace the affected electrolytes and repeat labs to be done after interventions completed. The patient's magnesium results have been reviewed and are listed below.  Recent Labs   Lab 02/29/24  1424   MG 1.3*      Etiology - suspect related to reported diarrhea

## 2024-03-01 NOTE — ASSESSMENT & PLAN NOTE
- Continue IV Ceftriaxone  - F/u blood, urine cultures and urine gram stain and modify therapy based on results.   - Follows with urology currently and is undergoing outpatient w/u for hematuria

## 2024-03-01 NOTE — ASSESSMENT & PLAN NOTE
Etiology may be due to use of anti-hypertensive medications and hypovolemia from patient's reported diarrhea symptoms over the last week  -hypotension has resolved with 1Liter of Iv fluids tonight   -more recent blood pressures now reading hypertensive

## 2024-03-01 NOTE — ASSESSMENT & PLAN NOTE
"Patient's FSGs are uncontrolled due to hyperglycemia on current medication regimen.  Last A1c reviewed-   Lab Results   Component Value Date    HGBA1C 8.7 (H) 02/29/2024     Most recent fingerstick glucose reviewed- No results for input(s): "POCTGLUCOSE" in the last 24 hours.  Current correctional scale  Low  Maintain anti-hyperglycemic dose as follows-   Antihyperglycemics (From admission, onward)      Start     Stop Route Frequency Ordered    03/01/24 0900  insulin detemir U-100 (Levemir) pen 10 Units         -- SubQ Daily 02/29/24 2241 03/01/24 0715  insulin aspart U-100 pen 4 Units         -- SubQ 3 times daily with meals 02/29/24 2211 02/29/24 2017  insulin aspart U-100 pen 0-5 Units         -- SubQ Before meals & nightly PRN 02/29/24 1918          Hold Oral hypoglycemics while patient is in the hospital.      "

## 2024-03-01 NOTE — PLAN OF CARE
Problem: Adult Inpatient Plan of Care  Goal: Plan of Care Review  Outcome: Ongoing, Progressing  Goal: Patient-Specific Goal (Individualized)  Outcome: Ongoing, Progressing  Goal: Absence of Hospital-Acquired Illness or Injury  Outcome: Ongoing, Progressing  Goal: Optimal Comfort and Wellbeing  Outcome: Ongoing, Progressing  Goal: Readiness for Transition of Care  Outcome: Ongoing, Progressing     Problem: Adjustment to Illness (Delirium)  Goal: Optimal Coping  Outcome: Ongoing, Progressing     Problem: Altered Behavior (Delirium)  Goal: Improved Behavioral Control  Outcome: Ongoing, Progressing     Problem: Attention and Thought Clarity Impairment (Delirium)  Goal: Improved Attention and Thought Clarity  Outcome: Ongoing, Progressing     Problem: Sleep Disturbance (Delirium)  Goal: Improved Sleep  Outcome: Ongoing, Progressing     Problem: Infection  Goal: Absence of Infection Signs and Symptoms  Outcome: Ongoing, Progressing     Problem: Diabetes Comorbidity  Goal: Blood Glucose Level Within Targeted Range  Outcome: Ongoing, Progressing     Problem: Impaired Wound Healing  Goal: Optimal Wound Healing  Outcome: Ongoing, Progressing     Problem: Fall Injury Risk  Goal: Absence of Fall and Fall-Related Injury  Outcome: Ongoing, Progressing     Problem: Hypertension Acute  Goal: Blood Pressure Within Desired Range  Outcome: Ongoing, Progressing     Problem: Adjustment to Device (Cardiac Rhythm Management Device)  Goal: Optimal Adjustment to Device  Outcome: Ongoing, Progressing     Problem: Bleeding (Cardiac Rhythm Management Device)  Goal: Absence of Bleeding  Outcome: Ongoing, Progressing     Problem: Device-Related Complication Risk (Cardiac Rhythm Management Device)  Goal: Effective Device Function  Outcome: Ongoing, Progressing     Problem: Infection (Cardiac Rhythm Management Device)  Goal: Absence of Infection Signs and Symptoms  Outcome: Ongoing, Progressing     Problem: Pain (Cardiac Rhythm Management  Device)  Goal: Acceptable Pain Level  Outcome: Ongoing, Progressing     Problem: Respiratory Compromise (Cardiac Rhythm Management Device)  Goal: Effective Oxygenation and Ventilation  Outcome: Ongoing, Progressing     Problem: Syncope  Goal: Absence of Syncopal Symptoms  Outcome: Ongoing, Progressing     Problem: Pain Acute  Goal: Acceptable Pain Control and Functional Ability  Outcome: Ongoing, Progressing

## 2024-03-01 NOTE — NURSING
Notified Dr. Thomas via secure chat at 1200 that patient's blood pressure is 192/79 with MAP of 114 and heart rate 62. Dr. Thomas added PRN hydralazine which was administered (see MAR). I notified Dr. Thomas via secure chat at 1622 that patient blood pressure had come down to 182/85. Dr. Thomas made some medication adjustments and added amlodipine, which was administered (see MAR).

## 2024-03-01 NOTE — ASSESSMENT & PLAN NOTE
-will need updated MAR from [facility] - missing page 1,  amiodarone listed on documentation I have,  Our prior med list shows use of Metoprolol and Diltiazem  -Will hold starting any AV-node blocking agents for now  -patient asymptomatic currently  -continue amiodarone.

## 2024-03-01 NOTE — ASSESSMENT & PLAN NOTE
-Hold scheduled bowel regimen  -Stool studies ordered in ED - add stool culture,  discontinue Ova/Parasites/Giardia/Cryptospodirium studies  -continue with other ordered studies, rule out C.diff

## 2024-03-02 LAB
ALBUMIN SERPL BCP-MCNC: 2.7 G/DL (ref 3.5–5.2)
ANION GAP SERPL CALC-SCNC: 10 MMOL/L (ref 8–16)
BASOPHILS # BLD AUTO: 0.05 K/UL (ref 0–0.2)
BASOPHILS NFR BLD: 0.7 % (ref 0–1.9)
BUN SERPL-MCNC: 11 MG/DL (ref 8–23)
CALCIUM SERPL-MCNC: 8.5 MG/DL (ref 8.7–10.5)
CHLORIDE SERPL-SCNC: 101 MMOL/L (ref 95–110)
CO2 SERPL-SCNC: 24 MMOL/L (ref 23–29)
CREAT SERPL-MCNC: 0.9 MG/DL (ref 0.5–1.4)
DIFFERENTIAL METHOD BLD: ABNORMAL
EOSINOPHIL # BLD AUTO: 0.3 K/UL (ref 0–0.5)
EOSINOPHIL NFR BLD: 4.2 % (ref 0–8)
ERYTHROCYTE [DISTWIDTH] IN BLOOD BY AUTOMATED COUNT: 15.9 % (ref 11.5–14.5)
EST. GFR  (NO RACE VARIABLE): >60 ML/MIN/1.73 M^2
GLUCOSE SERPL-MCNC: 333 MG/DL (ref 70–110)
HCT VFR BLD AUTO: 38.8 % (ref 40–54)
HGB BLD-MCNC: 12.2 G/DL (ref 14–18)
IMM GRANULOCYTES # BLD AUTO: 0.01 K/UL (ref 0–0.04)
IMM GRANULOCYTES NFR BLD AUTO: 0.1 % (ref 0–0.5)
LYMPHOCYTES # BLD AUTO: 1.4 K/UL (ref 1–4.8)
LYMPHOCYTES NFR BLD: 20.3 % (ref 18–48)
MAGNESIUM SERPL-MCNC: 1.6 MG/DL (ref 1.6–2.6)
MCH RBC QN AUTO: 26 PG (ref 27–31)
MCHC RBC AUTO-ENTMCNC: 31.4 G/DL (ref 32–36)
MCV RBC AUTO: 83 FL (ref 82–98)
MONOCYTES # BLD AUTO: 0.6 K/UL (ref 0.3–1)
MONOCYTES NFR BLD: 8.5 % (ref 4–15)
NEUTROPHILS # BLD AUTO: 4.6 K/UL (ref 1.8–7.7)
NEUTROPHILS NFR BLD: 66.2 % (ref 38–73)
NRBC BLD-RTO: 0 /100 WBC
PHOSPHATE SERPL-MCNC: 3 MG/DL (ref 2.7–4.5)
PHOSPHATE SERPL-MCNC: 3 MG/DL (ref 2.7–4.5)
PLATELET # BLD AUTO: 217 K/UL (ref 150–450)
PMV BLD AUTO: 9.6 FL (ref 9.2–12.9)
POCT GLUCOSE: 173 MG/DL (ref 70–110)
POCT GLUCOSE: 242 MG/DL (ref 70–110)
POCT GLUCOSE: 308 MG/DL (ref 70–110)
POCT GLUCOSE: 360 MG/DL (ref 70–110)
POCT GLUCOSE: 420 MG/DL (ref 70–110)
POTASSIUM SERPL-SCNC: 4.4 MMOL/L (ref 3.5–5.1)
RBC # BLD AUTO: 4.69 M/UL (ref 4.6–6.2)
SODIUM SERPL-SCNC: 135 MMOL/L (ref 136–145)
WBC # BLD AUTO: 6.91 K/UL (ref 3.9–12.7)

## 2024-03-02 PROCEDURE — 63600175 PHARM REV CODE 636 W HCPCS: Performed by: HOSPITALIST

## 2024-03-02 PROCEDURE — 96366 THER/PROPH/DIAG IV INF ADDON: CPT

## 2024-03-02 PROCEDURE — 25000003 PHARM REV CODE 250: Performed by: STUDENT IN AN ORGANIZED HEALTH CARE EDUCATION/TRAINING PROGRAM

## 2024-03-02 PROCEDURE — 80069 RENAL FUNCTION PANEL: CPT | Performed by: STUDENT IN AN ORGANIZED HEALTH CARE EDUCATION/TRAINING PROGRAM

## 2024-03-02 PROCEDURE — 63600175 PHARM REV CODE 636 W HCPCS: Performed by: STUDENT IN AN ORGANIZED HEALTH CARE EDUCATION/TRAINING PROGRAM

## 2024-03-02 PROCEDURE — 36415 COLL VENOUS BLD VENIPUNCTURE: CPT | Performed by: STUDENT IN AN ORGANIZED HEALTH CARE EDUCATION/TRAINING PROGRAM

## 2024-03-02 PROCEDURE — G0378 HOSPITAL OBSERVATION PER HR: HCPCS

## 2024-03-02 PROCEDURE — 83735 ASSAY OF MAGNESIUM: CPT | Performed by: STUDENT IN AN ORGANIZED HEALTH CARE EDUCATION/TRAINING PROGRAM

## 2024-03-02 PROCEDURE — 85025 COMPLETE CBC W/AUTO DIFF WBC: CPT | Performed by: STUDENT IN AN ORGANIZED HEALTH CARE EDUCATION/TRAINING PROGRAM

## 2024-03-02 PROCEDURE — 96372 THER/PROPH/DIAG INJ SC/IM: CPT | Performed by: STUDENT IN AN ORGANIZED HEALTH CARE EDUCATION/TRAINING PROGRAM

## 2024-03-02 PROCEDURE — 25000003 PHARM REV CODE 250: Performed by: HOSPITALIST

## 2024-03-02 RX ORDER — INSULIN ASPART 100 [IU]/ML
6 INJECTION, SOLUTION INTRAVENOUS; SUBCUTANEOUS
Status: DISCONTINUED | OUTPATIENT
Start: 2024-03-02 | End: 2024-03-04 | Stop reason: HOSPADM

## 2024-03-02 RX ORDER — INSULIN ASPART 100 [IU]/ML
0-10 INJECTION, SOLUTION INTRAVENOUS; SUBCUTANEOUS
Status: DISCONTINUED | OUTPATIENT
Start: 2024-03-02 | End: 2024-03-04 | Stop reason: HOSPADM

## 2024-03-02 RX ADMIN — LOSARTAN POTASSIUM 50 MG: 50 TABLET, FILM COATED ORAL at 08:03

## 2024-03-02 RX ADMIN — LACOSAMIDE 100 MG: 100 TABLET, FILM COATED ORAL at 08:03

## 2024-03-02 RX ADMIN — SUCRALFATE 1 G: 1 TABLET ORAL at 06:03

## 2024-03-02 RX ADMIN — ACETAMINOPHEN 500 MG: 500 TABLET ORAL at 05:03

## 2024-03-02 RX ADMIN — INSULIN ASPART 4 UNITS: 100 INJECTION, SOLUTION INTRAVENOUS; SUBCUTANEOUS at 08:03

## 2024-03-02 RX ADMIN — CEFTRIAXONE 1 G: 1 INJECTION, POWDER, FOR SOLUTION INTRAMUSCULAR; INTRAVENOUS at 10:03

## 2024-03-02 RX ADMIN — HYDRALAZINE HYDROCHLORIDE 75 MG: 50 TABLET ORAL at 08:03

## 2024-03-02 RX ADMIN — Medication 500 MG: at 08:03

## 2024-03-02 RX ADMIN — INSULIN ASPART 4 UNITS: 100 INJECTION, SOLUTION INTRAVENOUS; SUBCUTANEOUS at 05:03

## 2024-03-02 RX ADMIN — ACETAMINOPHEN 500 MG: 500 TABLET ORAL at 10:03

## 2024-03-02 RX ADMIN — HYDRALAZINE HYDROCHLORIDE 75 MG: 50 TABLET ORAL at 10:03

## 2024-03-02 RX ADMIN — Medication 500 MG: at 10:03

## 2024-03-02 RX ADMIN — INSULIN DETEMIR 10 UNITS: 100 INJECTION, SOLUTION SUBCUTANEOUS at 08:03

## 2024-03-02 RX ADMIN — APIXABAN 5 MG: 5 TABLET, FILM COATED ORAL at 08:03

## 2024-03-02 RX ADMIN — AMLODIPINE BESYLATE 5 MG: 5 TABLET ORAL at 08:03

## 2024-03-02 RX ADMIN — LACOSAMIDE 100 MG: 100 TABLET, FILM COATED ORAL at 10:03

## 2024-03-02 RX ADMIN — AMIODARONE HYDROCHLORIDE 200 MG: 200 TABLET ORAL at 08:03

## 2024-03-02 RX ADMIN — INSULIN ASPART 5 UNITS: 100 INJECTION, SOLUTION INTRAVENOUS; SUBCUTANEOUS at 12:03

## 2024-03-02 RX ADMIN — SUCRALFATE 1 G: 1 TABLET ORAL at 11:03

## 2024-03-02 RX ADMIN — SUCRALFATE 1 G: 1 TABLET ORAL at 05:03

## 2024-03-02 RX ADMIN — INSULIN ASPART 4 UNITS: 100 INJECTION, SOLUTION INTRAVENOUS; SUBCUTANEOUS at 12:03

## 2024-03-02 RX ADMIN — ACETAMINOPHEN 500 MG: 500 TABLET ORAL at 02:03

## 2024-03-02 RX ADMIN — INSULIN ASPART 6 UNITS: 100 INJECTION, SOLUTION INTRAVENOUS; SUBCUTANEOUS at 05:03

## 2024-03-02 RX ADMIN — TRAMADOL HYDROCHLORIDE 50 MG: 50 TABLET, COATED ORAL at 06:03

## 2024-03-02 RX ADMIN — APIXABAN 5 MG: 5 TABLET, FILM COATED ORAL at 10:03

## 2024-03-02 NOTE — ASSESSMENT & PLAN NOTE
- Continue IV Ceftriaxone  - Follow UCx  - BCx with A urinae  - Follows with urology currently and is undergoing outpatient w/u for hematuria

## 2024-03-02 NOTE — PROGRESS NOTES
[facility] - Observation [address] [place] [address] Medicine  Progress Note    Patient Name: Kamar Muñoz  MRN: [id number]  Patient Class: OP- Observation   Admission Date: 2/29/2024  Length of Stay: 0 days  Attending Physician: Giovanni Thomas MD  Primary Care Provider: Basim Guerrero MD        Subjective:     Principal Problem:Pre-syncope        HPI:  79 y/o WM with CAD, Atrial Fibrillation, hx of Embolic Stroke, Type 2 Diabetes presents to the ED from [facility] for concerns of hypotension, ?pre-syncope, cyanosis.  Per Nursing home note, patient was eating in dining room today when he had c/o of back pain then leaned forward and lips turned cyanotic. Initial vitals pulse Ox 72% T 97.4, HR 72, /88, RR 22,  EMS was called and patient placed on 5Liters oxygen, O2 sat improved to 98%.  His blood glucose was 243.   Transported to the ED.     On arrival to the ED, patient was awake/alert, reported feeling weak, initial VS here hypotensive 90/46 and HR 50 - sinus bradycardia on EKG.  No report of LOC, patient w/o any chest pain, nausea/vomiting, diaphoresis, felt light headed, but denied vision changes.   Over the last week he reports c/o of diarrhea - having 3+ loose bowel movements per day.  His Nursing home MAR has daily miralax listed, he reports going through cycles where bowel regimen causes loose stools but without it he will quickly become constipated.  No reported recent hospitalizations.   He denies any dysuria, but notes nocturia.     ADLs, patient will ambulate with PT and only with assistance, does not use a cane/walker, requires assistance to transfer from bed to wheelchair.  He can feed himself.  He's not aware of all outpatient medication names.     Of Nursing home records only Page 2/3 of MAR available, Page 1 is missing.     Overview/Hospital Course:  No notes on file    Interval History:   No events overnight. Patient blood cultures positive. Continue CTX. No complaints.        Review of Systems   Constitutional:  Positive for activity change. Negative for appetite change, chills and fever.   HENT: Negative.     Respiratory: Negative.     Cardiovascular: Negative.    Gastrointestinal: Negative.    Neurological:  Positive for weakness. Negative for syncope, speech difficulty and light-headedness.   Psychiatric/Behavioral:  Negative for confusion.      Objective:     Vital Signs (Most Recent):  Temp: 98 °F (36.7 °C) (03/02/24 1153)  Pulse: 100 (03/02/24 1153)  Resp: 18 (03/02/24 1153)  BP: (!) 123/58 (03/02/24 1153)  SpO2: 98 % (03/02/24 1153) Vital Signs (24h Range):  Temp:  [97.6 °F (36.4 °C)-98.3 °F (36.8 °C)] 98 °F (36.7 °C)  Pulse:  [] 100  Resp:  [17-20] 18  SpO2:  [95 %-98 %] 98 %  BP: (123-187)/(58-87) 123/58     Weight: 81.3 kg (179 lb 4.5 oz)  Body mass index is 23.02 kg/m².    Intake/Output Summary (Last 24 hours) at 3/2/2024 1410  Last data filed at 3/2/2024 0547  Gross per 24 hour   Intake 240 ml   Output 2300 ml   Net -2060 ml         Physical Exam  Vitals and nursing note reviewed.   Constitutional:       General: He is not in acute distress.  HENT:      Mouth/Throat:      Mouth: Mucous membranes are moist.      Pharynx: No oropharyngeal exudate or posterior oropharyngeal erythema.   Eyes:      General: No scleral icterus.     Extraocular Movements: Extraocular movements intact.   Cardiovascular:      Rate and Rhythm: Normal rate and regular rhythm.   Pulmonary:      Effort: Pulmonary effort is normal.      Breath sounds: Normal breath sounds.   Abdominal:      General: Bowel sounds are normal. There is no distension.      Palpations: Abdomen is soft.      Tenderness: There is no abdominal tenderness. There is no guarding or rebound.   Musculoskeletal:      Right lower leg: No edema.      Left lower leg: No edema.      Comments: Muscle wasting in LE   Neurological:      General: No focal deficit present.      Mental Status: He is alert.             Significant  Labs: All pertinent labs within the past 24 hours have been reviewed.  CBC:   Recent Labs   Lab 02/29/24  1424 03/02/24  0816   WBC 6.57 6.91   HGB 9.9* 12.2*   HCT 31.7* 38.8*    217     CMP:   Recent Labs   Lab 02/29/24  1424 03/01/24  0404 03/02/24  0816    137  137 135*   K 3.0* 4.3  4.3 4.4    104  104 101   CO2 22* 24  24 24   * 189*  189* 333*   BUN 17 14  14 11   CREATININE 0.9 1.1  1.1 0.9   CALCIUM 7.4* 8.3*  8.3* 8.5*   PROT 5.8*  --   --    ALBUMIN 2.4* 2.5* 2.7*   BILITOT 0.3  --   --    ALKPHOS 106  --   --    AST 13  --   --    ALT 8*  --   --    ANIONGAP 8 9  9 10       Significant Imaging: I have reviewed all pertinent imaging results/findings within the past 24 hours.    Assessment/Plan:      * Pre-syncope  Etiology based on nursing home records and presenting vitals/symptoms,  patient with transient hypoxemia cause is unclear and subsequent hypotension.     - Bradycardia present is sinus bradycardia, EKG shows chronic inferolateral T-wave inversions, no new TWI and no ST segment changes, no anginal symptoms   - Continue on cardiac monitoring  - Hold any av maria alejandra agents such as beta blocker/calcium channel blocker at this time  - Given patient cannot stand independently, orthostatic BP deferred, but patient was in seated position to eat during occurrence.   - Hypotension has resolved with 1L fluid bolus    Bradycardia  - Will need updated MAR from [facility] - missing page 1,  amiodarone listed on documentation I have,  Our prior med list shows use of Metoprolol and Diltiazem  - Will hold starting any AV-node blocking agents for now  (BB, CCB)  - Patient asymptomatic currently  - Continue amiodarone.         Acute cystitis with hematuria  - Continue IV Ceftriaxone  - Follow UCx  - BCx with A urinae  - Follows with urology currently and is undergoing outpatient w/u for hematuria      Essential hypertension  Chronic, uncontrolled. Latest blood pressure and vitals  reviewed-     Temp:  [97.6 °F (36.4 °C)-98.7 °F (37.1 °C)]   Pulse:  [53-74]   Resp:  [14-20]   BP: (164-198)/(78-94)   SpO2:  [93 %-96 %] .   Home meds for hypertension were reviewed and noted below.   Hypertension Medications               nitroGLYCERIN (NITROSTAT) 0.4 MG SL tablet Place 1 tablet (0.4 mg total) under the tongue every 5 (five) minutes as needed for Chest pain.    losartan (COZAAR) 25 MG tablet Take 25 mg by mouth once daily.    metoprolol succinate (TOPROL-XL) 25 MG 24 hr tablet Take 25 mg by mouth once daily.    metoprolol tartrate (LOPRESSOR) 50 MG tablet Take 50 mg by mouth Daily.            While in the hospital, will manage blood pressure as follows; Adjust home antihypertensive regimen as follows- Resume only losartan  Amlodipine added    Diarrhea  -Hold scheduled bowel regimen  -Stool studies ordered in ED - add stool culture,  discontinue Ova/Parasites/Giardia/Cryptospodirium studies  -continue with other ordered studies, rule out C.diff         Hypomagnesemia  Patient has Abnormal Magnesium: hypomagnesemia. Will continue to monitor electrolytes closely. Will replace the affected electrolytes and repeat labs to be done after interventions completed. The patient's magnesium results have been reviewed and are listed below.  Recent Labs   Lab 02/29/24  1424   MG 1.3*      Etiology - suspect related to reported diarrhea    Hypokalemia  Patient has hypokalemia which is Acute and currently uncontrolled. Most recent potassium levels reviewed-   Lab Results   Component Value Date    K 3.0 (L) 02/29/2024   . Will continue potassium replacement per protocol and recheck repeat levels after replacement completed.     Given total 50meq potassium repletion in ED (10meq IV, 40meq PO)   Trend chemistry panels    Hypotension due to hypovolemia  Etiology may be due to use of anti-hypertensive medications and hypovolemia from patient's reported diarrhea symptoms over the last week  -hypotension has resolved  "with 1Liter of Iv fluids tonight   -more recent blood pressures now reading hypertensive      Paroxysmal atrial fibrillation  Patient with Persistent (7 days or more) atrial fibrillation which is controlled currently with Amiodarone. Patient is currently in sinus rhythm.[id number] Score: 4. HASBLED Score: . Anticoagulation indicated. Anticoagulation done with Apixaban .    History of embolic stroke  Hx of atrial fibrillation  Chronically on apixaban.       Type 2 diabetes mellitus with hyperglycemia, with long-term current use of insulin  Patient's FSGs are uncontrolled due to hyperglycemia on current medication regimen.  Last A1c reviewed-   Lab Results   Component Value Date    HGBA1C 8.7 (H) 02/29/2024     Most recent fingerstick glucose reviewed- No results for input(s): "POCTGLUCOSE" in the last 24 hours.  Current correctional scale  Low  Maintain anti-hyperglycemic dose as follows-   Antihyperglycemics (From admission, onward)      Start     Stop Route Frequency Ordered    03/01/24 0900  insulin detemir U-100 (Levemir) pen 10 Units         -- SubQ Daily 02/29/24 2241    03/01/24 0715  insulin aspart U-100 pen 4 Units         -- SubQ 3 times daily with meals 02/29/24 2211 02/29/24 2017  insulin aspart U-100 pen 0-5 Units         -- SubQ Before meals & nightly PRN 02/29/24 1918          Hold Oral hypoglycemics while patient is in the hospital.          VTE Risk Mitigation (From admission, onward)           Ordered     apixaban tablet 5 mg  2 times daily         02/29/24 2015     Reason for No Pharmacological VTE Prophylaxis  Once        Question:  Reasons:  Answer:  Already adequately anticoagulated on oral Anticoagulants    02/29/24 2015     IP VTE HIGH RISK PATIENT  Once         02/29/24 2015     Place sequential compression device  Until discontinued         02/29/24 2015                    Discharge Planning   ALLIE: 3/1/2024     Code Status: Full Code   Is the patient medically ready for discharge?: No    " Reason for patient still in hospital (select all that apply): Patient trending condition, Laboratory test, Treatment, and Pending disposition  Discharge Plan A: Return to nursing home                  Giovanni Thomas MD  Department of Hospital Medicine   [facility] - Observ[facility]

## 2024-03-02 NOTE — ASSESSMENT & PLAN NOTE
Etiology based on nursing home records and presenting vitals/symptoms,  patient with transient hypoxemia cause is unclear and subsequent hypotension.     - Bradycardia present is sinus bradycardia, EKG shows chronic inferolateral T-wave inversions, no new TWI and no ST segment changes, no anginal symptoms   - Continue on cardiac monitoring  - Hold any av maria alejandra agents such as beta blocker/calcium channel blocker at this time  - Given patient cannot stand independently, orthostatic BP deferred, but patient was in seated position to eat during occurrence.   - Hypotension has resolved with 1L fluid bolus

## 2024-03-02 NOTE — SUBJECTIVE & OBJECTIVE
Interval History:   No events overnight. Patient blood cultures positive. Continue CTX. No complaints.       Review of Systems   Constitutional:  Positive for activity change. Negative for appetite change, chills and fever.   HENT: Negative.     Respiratory: Negative.     Cardiovascular: Negative.    Gastrointestinal: Negative.    Neurological:  Positive for weakness. Negative for syncope, speech difficulty and light-headedness.   Psychiatric/Behavioral:  Negative for confusion.      Objective:     Vital Signs (Most Recent):  Temp: 98 °F (36.7 °C) (03/02/24 1153)  Pulse: 100 (03/02/24 1153)  Resp: 18 (03/02/24 1153)  BP: (!) 123/58 (03/02/24 1153)  SpO2: 98 % (03/02/24 1153) Vital Signs (24h Range):  Temp:  [97.6 °F (36.4 °C)-98.3 °F (36.8 °C)] 98 °F (36.7 °C)  Pulse:  [] 100  Resp:  [17-20] 18  SpO2:  [95 %-98 %] 98 %  BP: (123-187)/(58-87) 123/58     Weight: 81.3 kg (179 lb 4.5 oz)  Body mass index is 23.02 kg/m².    Intake/Output Summary (Last 24 hours) at 3/2/2024 1410  Last data filed at 3/2/2024 0547  Gross per 24 hour   Intake 240 ml   Output 2300 ml   Net -2060 ml         Physical Exam  Vitals and nursing note reviewed.   Constitutional:       General: He is not in acute distress.  HENT:      Mouth/Throat:      Mouth: Mucous membranes are moist.      Pharynx: No oropharyngeal exudate or posterior oropharyngeal erythema.   Eyes:      General: No scleral icterus.     Extraocular Movements: Extraocular movements intact.   Cardiovascular:      Rate and Rhythm: Normal rate and regular rhythm.   Pulmonary:      Effort: Pulmonary effort is normal.      Breath sounds: Normal breath sounds.   Abdominal:      General: Bowel sounds are normal. There is no distension.      Palpations: Abdomen is soft.      Tenderness: There is no abdominal tenderness. There is no guarding or rebound.   Musculoskeletal:      Right lower leg: No edema.      Left lower leg: No edema.      Comments: Muscle wasting in LE    Neurological:      General: No focal deficit present.      Mental Status: He is alert.             Significant Labs: All pertinent labs within the past 24 hours have been reviewed.  CBC:   Recent Labs   Lab 02/29/24  1424 03/02/24  0816   WBC 6.57 6.91   HGB 9.9* 12.2*   HCT 31.7* 38.8*    217     CMP:   Recent Labs   Lab 02/29/24  1424 03/01/24  0404 03/02/24  0816    137  137 135*   K 3.0* 4.3  4.3 4.4    104  104 101   CO2 22* 24  24 24   * 189*  189* 333*   BUN 17 14  14 11   CREATININE 0.9 1.1  1.1 0.9   CALCIUM 7.4* 8.3*  8.3* 8.5*   PROT 5.8*  --   --    ALBUMIN 2.4* 2.5* 2.7*   BILITOT 0.3  --   --    ALKPHOS 106  --   --    AST 13  --   --    ALT 8*  --   --    ANIONGAP 8 9  9 10       Significant Imaging: I have reviewed all pertinent imaging results/findings within the past 24 hours.

## 2024-03-02 NOTE — ASSESSMENT & PLAN NOTE
- Will need updated MAR from [facility] - missing page 1,  amiodarone listed on documentation I have,  Our prior med list shows use of Metoprolol and Diltiazem  - Will hold starting any AV-node blocking agents for now  (BB, CCB)  - Patient asymptomatic currently  - Continue amiodarone.

## 2024-03-02 NOTE — NURSING
Patient would not let wound care nurse take picture of sacral wound earlier today because he had just been set up for breakfast. I just uploaded picture to chart.

## 2024-03-02 NOTE — NURSING
January 8, 2024         No Recipients      Patient: Dustin Guillen   YOB: 1951   Date of Visit: 1/8/2024       Dear Dr. Flaherty Recipients:    Thank you for referring Dustin Guillen to me for evaluation. Below are my notes for this visit with him.    If you have questions, please do not hesitate to call me. I look forward to following your patient along with you.      Sincerely,        Roland Oliveira MD        CC:   No Recipients  Roland Oliveira MD  1/8/2024  4:19 PM  Sign when Signing Visit  HEMATOLOGY ONCOLOGY NOTE     CONSULTING PHYSICIAN:  Roland Oliveira MD  DATE OF SERVICE: 01/08/24     REASON FOR CONSULT: adenocarcinoma of the sigmoid colon    STAGING:   Cancer Staging Summary for Dustin Guillen       Malignant neoplasm of sigmoid colon (CMD)       Stage Date Classification Stage Status    8/4/23 Pathologic Stage IIIC (pT4a, pN2b, cM0) Signed by Roland Oliveira MD on 8/16/23                     HISTORY OF PRESENT ILLNESS:  Dustin Guillen is a 72 year old male patient with adenocarcinoma of the sigmoid colon. He underwent colonoscopy on June 26, 2023 which revealed perianal skin tags; one 6 mm polyp in the rectum; obstructing tumor in the sigmoid colon; diverticulosis in the sigmoid colon; and nonbleeding external and internal hemorrhoids.  Pathology of the sigmoid colon mass revealed an adenocarcinoma.  He was referred to Dr. Cosme.  CEA was elevated to 10.4 CT abdomen pelvis on July 11, 2023 showed no acute intra-abdominal or intrapelvic process. CT chest on August 1, 2023 showed few scattered micronodules measuring up to 4 to 5 mm. He underwent robotic sigmoid colectomy, microvascular perfusion assessment, proctoscopy, appendectomy, and sentinel lymph node biopsy on August 4, 2023.  Pathology revealed invasive, well differentiated colonic adenocarcinoma measuring 6.4 cm in largest dimension with invasion into visceral peritoneum.  Tumor deposits x4 were present.  7 out of 21 lymph nodes  Nurses Note -- 4 Eyes      3/2/2024   6:32 AM      Skin assessed during: Daily Assessment      [] No Altered Skin Integrity Present    []Prevention Measures Documented      [x] Yes- Altered Skin Integrity Present or Discovered   [] LDA Added if Not in Epic (Describe Wound)   [x] New Altered Skin Integrity was Present on Admit and Documented in LDA   [x] Wound Image Taken    Wound Care Consulted? Yes    Attending Nurse:  Dorys Castillo RN/Staff Member:  liza roberto          were positive for metastatic carcinoma.  He was staged as a T4aN2b carcinoma.  MSI testing was stable.  HER2 was negative by immunohistochemistry. He underwent port-a-cath placement on 8/28/2023. He started FOLFOX on 09/13/2023.  He completed 9 cycles. He is here prior to cycle 10. He has altered taste buds, fatigue, and a slight cough.  He denied fevers, chills, nausea, or vomiting.  He has cold sensitivity which is not painful.      HISTORIES:    Oncology History  Oncology History   Malignant neoplasm of sigmoid colon (CMD)   7/19/2023 Initial Diagnosis    Malignant neoplasm of sigmoid colon (CMD)     8/4/2023 -  Cancer Staged    Staging form: Colon and Rectum, AJCC 8th Edition  - Pathologic stage from 8/4/2023: Stage IIIC (pT4a, pN2b, cM0) - Signed by Roland Oliveira MD on 8/16/2023 9/13/2023 -  Chemotherapy    fluorouracil (ADRUCIL) injection 700 mg, 400 mg/m2 = 700 mg, Intravenous, ONCE, 9 of 12 cycles  Administration: 700 mg (9/13/2023), 700 mg (9/27/2023), 700 mg (10/11/2023), 700 mg (10/25/2023), 700 mg (11/8/2023), 700 mg (11/22/2023), 700 mg (12/6/2023), 725 mg (12/20/2023), 725 mg (1/3/2024)  fluorouracil (ADRUCIL) 4,200 mg in sodium chloride 0.9 % 241 mL total volume elastomeric chemo infusion, 1,200 mg/m2/day = 4,200 mg (100 % of original dose 1,200 mg/m2/day), Intravenous (Continuous Infusion), ONCE (CONTINUOUS OVER 46 HOURS), 9 of 12 cycles  Dose modification: 1,200 mg/m2/day (original dose 1,200 mg/m2/day, Cycle 1), 4,200 mg (original dose 1,200 mg/m2/day, Cycle 8, Reason: Other (see comments), Comment: dose compounded at home health)  Administration: 4,200 mg (9/13/2023), 4,200 mg (9/27/2023), 4,200 mg (10/11/2023), 4,200 mg (10/25/2023), 4,200 mg (11/8/2023), 4,200 mg (11/22/2023), 4,200 mg (12/6/2023), 4,200 mg (12/20/2023), 4,200 mg (1/3/2024)          Past Medical History  Past Medical History:   Diagnosis Date    Colon cancer (CMD)     Colon polyps     Diabetes mellitus (CMD)      Essential (primary) hypertension     Gastroesophageal reflux disease     Gout     Hyperlipidemia        Past Surgical History  Past Surgical History:   Procedure Laterality Date    Appendectomy      along with colectomy    Colonoscopy  06/26/2023    invasive adenocarcinoma    Subtotal colectomy  08/04/2023    Robotic sigmoid colectomy       Family History  Family History   Problem Relation Age of Onset    Natural Causes Mother     Natural Causes Father     Cancer, Throat Brother        Social History  Social History     Tobacco Use    Smoking status: Never    Smokeless tobacco: Never   Substance Use Topics    Alcohol use: Never       Medications   Current Outpatient Medications   Medication Sig Dispense Refill    VITAMIN D, CHOLECALCIFEROL, PO Take 2,000 Int'l Units by mouth daily.      lidocaine-prilocaine (EMLA) cream Apply to port 1 hour prior to treatment 30 g 5    baclofen (LIORESAL) 10 MG tablet Take 1 tablet by mouth every 6 hours as needed (hiccups). 30 tablet 1    prochlorperazine (COMPAZINE) 10 MG tablet Take 1 tablet by mouth every 6 hours as needed for Nausea (mild to moderate nausea). 60 tablet 3    ondansetron (ZOFRAN ODT) 4 MG disintegrating tablet Place 1 tablet onto the tongue every 8 hours as needed for Nausea (moderate to severe nausea). 30 tablet 1    aspirin (ECOTRIN) 81 MG EC tablet Take 81 mg by mouth daily.      Magnesium 400 MG Tab Take 400 mg by mouth daily as needed.      amLODIPine (NORVASC) 5 MG tablet Take 5 mg by mouth daily.      losartan (COZAAR) 100 MG tablet Take 100 mg by mouth daily.      allopurinol (ZYLOPRIM) 100 MG tablet Take 100 mg by mouth daily.      metFORMIN (GLUCOPHAGE) 500 MG tablet Take 500 mg by mouth in the morning and 500 mg in the evening. Take with meals.      rosuvastatin (CRESTOR) 20 MG tablet Take 20 mg by mouth every evening.      Multiple Vitamin (MULTIVITAMIN ADULT PO) Take 1 tablet by mouth daily.      Omega-3 Fatty Acids (Fish Oil) 1000 MG capsule Take 2  g by mouth daily.      Probiotic Product (Probiotic-10) Chew Tab Chew 1 tablet by mouth daily.       No current facility-administered medications for this visit.        Allergies  ALLERGIES:   Allergen Reactions    Azithromycin Other (See Comments) and PRURITUS    Penicillin G PRURITUS    Penicillins PRURITUS    Lisinopril Cough and Other (See Comments)      REVIEW OF SYSTEMS:    Constitutional: + fatigue.  + weight loss no fever, no chills, no night sweats.  Eyes: no diplopia, no transient or permanent loss of vision, no scotomata, + wears glasses.  ENT/mouth: no tinnitus, normal hearing, no epistaxis, no hoarseness, no oral ulcers, no gingival bleeding, no sore throat, no postnasal drip, no nasal drip, no mouth pain, no sinus pain  Cardiovascular: no chest pain, no palpitations, no syncope, no upper extremity edema, no lower extremity edema, no calf discomfort.  Respiratory: +  cough (dry), no hemoptysis, no dyspnea, no pleurisy, no wheezing.  Gastrointestinal: no abdominal pain, no nausea, no vomiting, no constipation, no hematochezia, no jaundice, no abdominal cramping, no hematemesis, no diarrhea, no melena, no dyspepsia, no dysphagia.  Musculoskeletal: no muscle pain, no swollen joints, no joint redness, no bone pain, no spine tenderness.  Skin: no rash, no nodules, no pruritus, no lesions.  Neurologic: no confusion, no seizures, no syncope, no tremor, no speech change, no headache, no hiccups, no abnormal gait, + sensory changes (fingers and toes)  Psychiatric: no anxiety.  no depression, concentration normal.  Endocrine: no polyuria, no polydipsia, no thyroid disease symptoms, no hot flashes.  Hematologic: no epistaxis, no gingival bleeding, no petechiae, no ecchymosis.  Lymphatic: no lymphadenopathy, no lymphedema.  Allergy/immunologic: no eczema, no frequent mucous membrane infections, no frequent respiratory infections, no recurrent urticaria, no frequent skin infections.     OBJECTIVE:      Vital  Signs  Vitals:    01/08/24 1609   BP: 139/72   Pulse: 81   Resp: 16   SpO2: 98%   Weight: 71.2 kg (156 lb 15.5 oz)   Height: 5' 5\" (1.651 m)   PainSc:  0        ECOG [01/08/24 1614]   ECOG Performance Status 0      Physical Examination  General: awake, alert oriented, NAD, appears staged age  Skin: skin color, texture, and turgor normal  Head: normocephalic, without obvious abnormality  Eyes:  no scleral icterus   Ears/Nose/Throat: moist mucus membranes, no op lesions  Neck: supple, no masses  Lungs: clear   Heart: regular rate and rhythm.  Abdomen: soft, non-tender, non-distended, bowel sounds normal.   Lymph Nodes: no  adenopathy.  Musculoskeletal: symmetrical strength and tone.  Neurologic:  awake, alert, oriented x 3, no focal deficits.     ASSESSMENT/PLAN:  72 year old gentleman with adenocarcinoma of sigmoid colon presents for follow-up.    1. Colon cancer: He underwent colonoscopy in late June 2023 which revealed a mass in the sigmoid colon.  Pathology revealed an adenocarcinoma (grade 2). BRAF, KRAS, NRAS, and PIK3CA were wildtype.  MSI was stable.  PD-L1 was negative. CT scans were negative for distant disease although he had small micronodules bilateral in his lungs.  His CEA was elevated at the time of diagnosis.  He underwent surgical resection on August 4, 2023. Pathology revealed invasive, well differentiated colonic adenocarcinoma measuring 6.4 cm in largest dimension with invasion into visceral peritoneum.  Tumor deposits x4 were present.  7 out of 21 lymph nodes were positive for metastatic carcinoma.  He was staged as a T4aN2b carcinoma.  MSI testing was stable.  HER2 was negative by immunohistochemistry.  He does have a history of prediabetes.  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 I would like to continue with FOLFOX at this time; he is tolerating it well.  Echocardiogram on August 29, 2023 revealed normal LV systolic function with ejection fraction of 60 to 65%.  He started FOLFOX on  09/13/2023. He completed 9 cycles. He is here prior to cycle 10. We will continue as planned.     2. Lung nodules: These are too small to characterize.  CT chest on 1/02/2024 revealed less distinct separate nodules superior segment left lower lobe, lingula, anterior segment right lower lobe and anterior segment right upper lobe. Largest of these on prior study lingula measured 6 mm now measures 3 mm; coarsening of interlobular septae with minimal groundglass density convexity of both lower lobes slightly more prominent.  For now, we will continue as planned with chemotherapy. We will repeat the CT scans after completion of chemotherapy. We also discussed pulmonary evaluation but will wait on this for now.     3. Anemia: secondary to  chemotherapy.     4 Toxicities  A. Fatigue: secondary to chemotherapy  B. Back pain: resolved.   C. Hypercalcemia: resolved  D. Elevated LFTs: Secondary to chemotherapy. This has resolved  E. Weight loss: He is eating well but he has poor taste buds.  We discussed using seasoning and plastic utensils.   F. Gout: He will continue on allopurinol 100 mg daily    I spent a total of 45 minutes on the day of the visit.  This includes pre-charting, chart review, documenting and referring/communicating with other health care professionals.    All of the patient's questions were answered to their satisfaction.      -----------------------------------------------------------------------------------------------------------------------  Charting performed by Yoly WILHELM for Dr. Oliveira  The documentation recorded by the yoly accurately reflects the service I personally performed and the decisions made by jimmy Oliveira MD

## 2024-03-02 NOTE — PROGRESS NOTES
[facility] [address] [place] [address] Medicine  Progress Note    Patient Name: Kamar Muñoz  MRN: [id number]  Patient Class: OP- Observation   Admission Date: 2/29/2024  Length of Stay: 0 days  Attending Physician: Giovanni Thomas MD  Primary Care Provider: Basim Guerrero MD        Subjective:     Principal Problem:Pre-syncope        HPI:  79 y/o WM with CAD, Atrial Fibrillation, hx of Embolic Stroke, Type 2 Diabetes presents to the ED from [facility] for concerns of hypotension, ?pre-syncope, cyanosis.  Per Nursing home note, patient was eating in dining room today when he had c/o of back pain then leaned forward and lips turned cyanotic. Initial vitals pulse Ox 72% T 97.4, HR 72, /88, RR 22,  EMS was called and patient placed on 5Liters oxygen, O2 sat improved to 98%.  His blood glucose was 243.   Transported to the ED.     On arrival to the ED, patient was awake/alert, reported feeling weak, initial VS here hypotensive 90/46 and HR 50 - sinus bradycardia on EKG.  No report of LOC, patient w/o any chest pain, nausea/vomiting, diaphoresis, felt light headed, but denied vision changes.   Over the last week he reports c/o of diarrhea - having 3+ loose bowel movements per day.  His Nursing home MAR has daily miralax listed, he reports going through cycles where bowel regimen causes loose stools but without it he will quickly become constipated.  No reported recent hospitalizations.   He denies any dysuria, but notes nocturia.     ADLs, patient will ambulate with PT and only with assistance, does not use a cane/walker, requires assistance to transfer from bed to wheelchair.  He can feed himself.  He's not aware of all outpatient medication names.     Of Nursing home records only Page 2/3 of MAR available, Page 1 is missing.     Overview/Hospital Course:  No notes on file    Interval History:   No events overnight.  Patient with no complaints this morning.  Blood pressure medications  uptitrated.  No complaints of pre syncope.      Review of Systems   Constitutional:  Positive for activity change. Negative for appetite change, chills and fever.   HENT: Negative.     Respiratory: Negative.     Cardiovascular: Negative.    Gastrointestinal: Negative.    Neurological:  Positive for weakness. Negative for syncope, speech difficulty and light-headedness.   Psychiatric/Behavioral:  Negative for confusion.      Objective:     Vital Signs (Most Recent):  Temp: 97.8 °F (36.6 °C) (03/01/24 1604)  Pulse: 67 (03/01/24 1604)  Resp: 20 (03/01/24 1604)  BP: (!) 182/85 (03/01/24 1604)  SpO2: 95 % (03/01/24 1604) Vital Signs (24h Range):  Temp:  [97.6 °F (36.4 °C)-98.7 °F (37.1 °C)] 97.8 °F (36.6 °C)  Pulse:  [53-74] 67  Resp:  [14-20] 20  SpO2:  [93 %-96 %] 95 %  BP: (164-198)/(78-94) 182/85     Weight: 81.3 kg (179 lb 4.5 oz)  Body mass index is 23.02 kg/m².    Intake/Output Summary (Last 24 hours) at 3/1/2024 1817  Last data filed at 3/1/2024 1611  Gross per 24 hour   Intake 400 ml   Output 350 ml   Net 50 ml         Physical Exam  Vitals and nursing note reviewed.   Constitutional:       General: He is not in acute distress.  HENT:      Mouth/Throat:      Mouth: Mucous membranes are moist.      Pharynx: No oropharyngeal exudate or posterior oropharyngeal erythema.   Eyes:      General: No scleral icterus.     Extraocular Movements: Extraocular movements intact.   Cardiovascular:      Rate and Rhythm: Normal rate and regular rhythm.   Pulmonary:      Effort: Pulmonary effort is normal.      Breath sounds: Normal breath sounds.   Abdominal:      General: Bowel sounds are normal. There is no distension.      Palpations: Abdomen is soft.      Tenderness: There is no abdominal tenderness. There is no guarding or rebound.   Musculoskeletal:      Right lower leg: No edema.      Left lower leg: No edema.      Comments: Muscle wasting in LE   Neurological:      General: No focal deficit present.      Mental Status: He  is alert.             Significant Labs: All pertinent labs within the past 24 hours have been reviewed.    Significant Imaging: I have reviewed all pertinent imaging results/findings within the past 24 hours.    Assessment/Plan:      * Pre-syncope  Etiology based on nursing home records and presenting vitals/symptoms,  patient with transient hypoxemia cause is unclear and subsequent hypotension.     - Bradycardia present is sinus bradycardia, EKG shows chronic inferolateral T-wave inversions, no new TWI and no ST segment changes, no anginal symptoms   - Continue on cardiac monitoring  - Hold any av maria alejandra agents such as beta blocker/calcium channel blocker at this time  - Given patient cannot stand independently, orthostatic BP deferred, but patient was in seated position to eat during occurrence.   - Hypotension has resolved with 1L fluid bolus    Bradycardia  - Will need updated MAR from [facility] - missing page 1,  amiodarone listed on documentation I have,  Our prior med list shows use of Metoprolol and Diltiazem  - Will hold starting any AV-node blocking agents for now  (BB, CCB)  - Patient asymptomatic currently  - Continue amiodarone.         Acute cystitis with hematuria  - Continue IV Ceftriaxone  - F/u blood, urine cultures and urine gram stain and modify therapy based on results.   - Follows with urology currently and is undergoing outpatient w/u for hematuria      Essential hypertension  Chronic, uncontrolled. Latest blood pressure and vitals reviewed-     Temp:  [97.6 °F (36.4 °C)-98.7 °F (37.1 °C)]   Pulse:  [53-74]   Resp:  [14-20]   BP: (164-198)/(78-94)   SpO2:  [93 %-96 %] .   Home meds for hypertension were reviewed and noted below.   Hypertension Medications               nitroGLYCERIN (NITROSTAT) 0.4 MG SL tablet Place 1 tablet (0.4 mg total) under the tongue every 5 (five) minutes as needed for Chest pain.    losartan (COZAAR) 25 MG tablet Take 25 mg by mouth once daily.    metoprolol  succinate (TOPROL-XL) 25 MG 24 hr tablet Take 25 mg by mouth once daily.    metoprolol tartrate (LOPRESSOR) 50 MG tablet Take 50 mg by mouth Daily.            While in the hospital, will manage blood pressure as follows; Adjust home antihypertensive regimen as follows- Resume only losartan  Amlodipine added    Diarrhea  -Hold scheduled bowel regimen  -Stool studies ordered in ED - add stool culture,  discontinue Ova/Parasites/Giardia/Cryptospodirium studies  -continue with other ordered studies, rule out C.diff         Hypomagnesemia  Patient has Abnormal Magnesium: hypomagnesemia. Will continue to monitor electrolytes closely. Will replace the affected electrolytes and repeat labs to be done after interventions completed. The patient's magnesium results have been reviewed and are listed below.  Recent Labs   Lab 02/29/24  1424   MG 1.3*      Etiology - suspect related to reported diarrhea    Hypokalemia  Patient has hypokalemia which is Acute and currently uncontrolled. Most recent potassium levels reviewed-   Lab Results   Component Value Date    K 3.0 (L) 02/29/2024   . Will continue potassium replacement per protocol and recheck repeat levels after replacement completed.     Given total 50meq potassium repletion in ED (10meq IV, 40meq PO)   Trend chemistry panels    Hypotension due to hypovolemia  Etiology may be due to use of anti-hypertensive medications and hypovolemia from patient's reported diarrhea symptoms over the last week  -hypotension has resolved with 1Liter of Iv fluids tonight   -more recent blood pressures now reading hypertensive      Paroxysmal atrial fibrillation  Patient with Persistent (7 days or more) atrial fibrillation which is controlled currently with Amiodarone. Patient is currently in sinus rhythm.[id number] Score: 4. HASBLED Score: . Anticoagulation indicated. Anticoagulation done with Apixaban .    History of embolic stroke  Hx of atrial fibrillation  Chronically on apixaban.  "      Type 2 diabetes mellitus with hyperglycemia, with long-term current use of insulin  Patient's FSGs are uncontrolled due to hyperglycemia on current medication regimen.  Last A1c reviewed-   Lab Results   Component Value Date    HGBA1C 8.7 (H) 02/29/2024     Most recent fingerstick glucose reviewed- No results for input(s): "POCTGLUCOSE" in the last 24 hours.  Current correctional scale  Low  Maintain anti-hyperglycemic dose as follows-   Antihyperglycemics (From admission, onward)      Start     Stop Route Frequency Ordered    03/01/24 0900  insulin detemir U-100 (Levemir) pen 10 Units         -- SubQ Daily 02/29/24 2241    03/01/24 0715  insulin aspart U-100 pen 4 Units         -- SubQ 3 times daily with meals 02/29/24 2211 02/29/24 2017  insulin aspart U-100 pen 0-5 Units         -- SubQ Before meals & nightly PRN 02/29/24 1918          Hold Oral hypoglycemics while patient is in the hospital.          VTE Risk Mitigation (From admission, onward)           Ordered     apixaban tablet 5 mg  2 times daily         02/29/24 2015     Reason for No Pharmacological VTE Prophylaxis  Once        Question:  Reasons:  Answer:  Already adequately anticoagulated on oral Anticoagulants    02/29/24 2015     IP VTE HIGH RISK PATIENT  Once         02/29/24 2015     Place sequential compression device  Until discontinued         02/29/24 2015                    Discharge Planning   ALLIE: 3/1/2024     Code Status: Full Code   Is the patient medically ready for discharge?: No    Reason for patient still in hospital (select all that apply): Patient trending condition, Laboratory test, Treatment, and Pending disposition  Discharge Plan A: Return to nursing home                  Giovanni Thomas MD  Department of Hospital Medicine   [facility] 11H    "

## 2024-03-02 NOTE — ASSESSMENT & PLAN NOTE
Chronic, uncontrolled. Latest blood pressure and vitals reviewed-     Temp:  [97.6 °F (36.4 °C)-98.7 °F (37.1 °C)]   Pulse:  [53-74]   Resp:  [14-20]   BP: (164-198)/(78-94)   SpO2:  [93 %-96 %] .   Home meds for hypertension were reviewed and noted below.   Hypertension Medications               nitroGLYCERIN (NITROSTAT) 0.4 MG SL tablet Place 1 tablet (0.4 mg total) under the tongue every 5 (five) minutes as needed for Chest pain.    losartan (COZAAR) 25 MG tablet Take 25 mg by mouth once daily.    metoprolol succinate (TOPROL-XL) 25 MG 24 hr tablet Take 25 mg by mouth once daily.    metoprolol tartrate (LOPRESSOR) 50 MG tablet Take 50 mg by mouth Daily.            While in the hospital, will manage blood pressure as follows; Adjust home antihypertensive regimen as follows- Resume only losartan  Amlodipine added

## 2024-03-02 NOTE — SUBJECTIVE & OBJECTIVE
Interval History:   No events overnight.  Patient with no complaints this morning.  Blood pressure medications uptitrated.  No complaints of pre syncope.      Review of Systems   Constitutional:  Positive for activity change. Negative for appetite change, chills and fever.   HENT: Negative.     Respiratory: Negative.     Cardiovascular: Negative.    Gastrointestinal: Negative.    Neurological:  Positive for weakness. Negative for syncope, speech difficulty and light-headedness.   Psychiatric/Behavioral:  Negative for confusion.      Objective:     Vital Signs (Most Recent):  Temp: 97.8 °F (36.6 °C) (03/01/24 1604)  Pulse: 67 (03/01/24 1604)  Resp: 20 (03/01/24 1604)  BP: (!) 182/85 (03/01/24 1604)  SpO2: 95 % (03/01/24 1604) Vital Signs (24h Range):  Temp:  [97.6 °F (36.4 °C)-98.7 °F (37.1 °C)] 97.8 °F (36.6 °C)  Pulse:  [53-74] 67  Resp:  [14-20] 20  SpO2:  [93 %-96 %] 95 %  BP: (164-198)/(78-94) 182/85     Weight: 81.3 kg (179 lb 4.5 oz)  Body mass index is 23.02 kg/m².    Intake/Output Summary (Last 24 hours) at 3/1/2024 1817  Last data filed at 3/1/2024 1611  Gross per 24 hour   Intake 400 ml   Output 350 ml   Net 50 ml         Physical Exam  Vitals and nursing note reviewed.   Constitutional:       General: He is not in acute distress.  HENT:      Mouth/Throat:      Mouth: Mucous membranes are moist.      Pharynx: No oropharyngeal exudate or posterior oropharyngeal erythema.   Eyes:      General: No scleral icterus.     Extraocular Movements: Extraocular movements intact.   Cardiovascular:      Rate and Rhythm: Normal rate and regular rhythm.   Pulmonary:      Effort: Pulmonary effort is normal.      Breath sounds: Normal breath sounds.   Abdominal:      General: Bowel sounds are normal. There is no distension.      Palpations: Abdomen is soft.      Tenderness: There is no abdominal tenderness. There is no guarding or rebound.   Musculoskeletal:      Right lower leg: No edema.      Left lower leg: No edema.       Comments: Muscle wasting in LE   Neurological:      General: No focal deficit present.      Mental Status: He is alert.             Significant Labs: All pertinent labs within the past 24 hours have been reviewed.    Significant Imaging: I have reviewed all pertinent imaging results/findings within the past 24 hours.

## 2024-03-03 LAB
ALBUMIN SERPL BCP-MCNC: 2.7 G/DL (ref 3.5–5.2)
ANION GAP SERPL CALC-SCNC: 11 MMOL/L (ref 8–16)
BACTERIA BLD CULT: ABNORMAL
BASOPHILS # BLD AUTO: 0.04 K/UL (ref 0–0.2)
BASOPHILS NFR BLD: 0.5 % (ref 0–1.9)
BUN SERPL-MCNC: 15 MG/DL (ref 8–23)
CALCIUM SERPL-MCNC: 8.9 MG/DL (ref 8.7–10.5)
CHLORIDE SERPL-SCNC: 100 MMOL/L (ref 95–110)
CO2 SERPL-SCNC: 23 MMOL/L (ref 23–29)
CREAT SERPL-MCNC: 1.1 MG/DL (ref 0.5–1.4)
DIFFERENTIAL METHOD BLD: ABNORMAL
EOSINOPHIL # BLD AUTO: 0.3 K/UL (ref 0–0.5)
EOSINOPHIL NFR BLD: 3.8 % (ref 0–8)
ERYTHROCYTE [DISTWIDTH] IN BLOOD BY AUTOMATED COUNT: 15.7 % (ref 11.5–14.5)
EST. GFR  (NO RACE VARIABLE): >60 ML/MIN/1.73 M^2
GLUCOSE SERPL-MCNC: 171 MG/DL (ref 70–110)
HCT VFR BLD AUTO: 37.2 % (ref 40–54)
HGB BLD-MCNC: 11.9 G/DL (ref 14–18)
IMM GRANULOCYTES # BLD AUTO: 0.01 K/UL (ref 0–0.04)
IMM GRANULOCYTES NFR BLD AUTO: 0.1 % (ref 0–0.5)
LYMPHOCYTES # BLD AUTO: 1.8 K/UL (ref 1–4.8)
LYMPHOCYTES NFR BLD: 25.1 % (ref 18–48)
MAGNESIUM SERPL-MCNC: 1.6 MG/DL (ref 1.6–2.6)
MCH RBC QN AUTO: 26.9 PG (ref 27–31)
MCHC RBC AUTO-ENTMCNC: 32 G/DL (ref 32–36)
MCV RBC AUTO: 84 FL (ref 82–98)
MONOCYTES # BLD AUTO: 0.7 K/UL (ref 0.3–1)
MONOCYTES NFR BLD: 9.7 % (ref 4–15)
NEUTROPHILS # BLD AUTO: 4.4 K/UL (ref 1.8–7.7)
NEUTROPHILS NFR BLD: 60.8 % (ref 38–73)
NRBC BLD-RTO: 0 /100 WBC
PHOSPHATE SERPL-MCNC: 3.2 MG/DL (ref 2.7–4.5)
PHOSPHATE SERPL-MCNC: 3.2 MG/DL (ref 2.7–4.5)
PLATELET # BLD AUTO: 249 K/UL (ref 150–450)
PMV BLD AUTO: 10 FL (ref 9.2–12.9)
POCT GLUCOSE: 162 MG/DL (ref 70–110)
POCT GLUCOSE: 258 MG/DL (ref 70–110)
POCT GLUCOSE: 317 MG/DL (ref 70–110)
POCT GLUCOSE: 96 MG/DL (ref 70–110)
POTASSIUM SERPL-SCNC: 3.9 MMOL/L (ref 3.5–5.1)
RBC # BLD AUTO: 4.43 M/UL (ref 4.6–6.2)
SODIUM SERPL-SCNC: 134 MMOL/L (ref 136–145)
WBC # BLD AUTO: 7.3 K/UL (ref 3.9–12.7)

## 2024-03-03 PROCEDURE — G0378 HOSPITAL OBSERVATION PER HR: HCPCS

## 2024-03-03 PROCEDURE — 80069 RENAL FUNCTION PANEL: CPT | Performed by: STUDENT IN AN ORGANIZED HEALTH CARE EDUCATION/TRAINING PROGRAM

## 2024-03-03 PROCEDURE — 83735 ASSAY OF MAGNESIUM: CPT | Performed by: STUDENT IN AN ORGANIZED HEALTH CARE EDUCATION/TRAINING PROGRAM

## 2024-03-03 PROCEDURE — 85025 COMPLETE CBC W/AUTO DIFF WBC: CPT | Performed by: STUDENT IN AN ORGANIZED HEALTH CARE EDUCATION/TRAINING PROGRAM

## 2024-03-03 PROCEDURE — 96375 TX/PRO/DX INJ NEW DRUG ADDON: CPT

## 2024-03-03 PROCEDURE — 96366 THER/PROPH/DIAG IV INF ADDON: CPT

## 2024-03-03 PROCEDURE — 36415 COLL VENOUS BLD VENIPUNCTURE: CPT | Performed by: STUDENT IN AN ORGANIZED HEALTH CARE EDUCATION/TRAINING PROGRAM

## 2024-03-03 PROCEDURE — 25000003 PHARM REV CODE 250: Performed by: STUDENT IN AN ORGANIZED HEALTH CARE EDUCATION/TRAINING PROGRAM

## 2024-03-03 PROCEDURE — 25000003 PHARM REV CODE 250: Performed by: HOSPITALIST

## 2024-03-03 PROCEDURE — 96376 TX/PRO/DX INJ SAME DRUG ADON: CPT

## 2024-03-03 PROCEDURE — 63600175 PHARM REV CODE 636 W HCPCS: Performed by: HOSPITALIST

## 2024-03-03 RX ORDER — SODIUM CHLORIDE 9 MG/ML
INJECTION, SOLUTION INTRAVENOUS CONTINUOUS
Status: DISCONTINUED | OUTPATIENT
Start: 2024-03-03 | End: 2024-03-03

## 2024-03-03 RX ADMIN — LOSARTAN POTASSIUM 50 MG: 50 TABLET, FILM COATED ORAL at 08:03

## 2024-03-03 RX ADMIN — CEFTRIAXONE 1 G: 1 INJECTION, POWDER, FOR SOLUTION INTRAMUSCULAR; INTRAVENOUS at 09:03

## 2024-03-03 RX ADMIN — INSULIN ASPART 6 UNITS: 100 INJECTION, SOLUTION INTRAVENOUS; SUBCUTANEOUS at 12:03

## 2024-03-03 RX ADMIN — APIXABAN 5 MG: 5 TABLET, FILM COATED ORAL at 08:03

## 2024-03-03 RX ADMIN — Medication 500 MG: at 09:03

## 2024-03-03 RX ADMIN — HYDRALAZINE HYDROCHLORIDE 75 MG: 50 TABLET ORAL at 09:03

## 2024-03-03 RX ADMIN — LACOSAMIDE 100 MG: 100 TABLET, FILM COATED ORAL at 08:03

## 2024-03-03 RX ADMIN — SUCRALFATE 1 G: 1 TABLET ORAL at 12:03

## 2024-03-03 RX ADMIN — INSULIN ASPART 8 UNITS: 100 INJECTION, SOLUTION INTRAVENOUS; SUBCUTANEOUS at 12:03

## 2024-03-03 RX ADMIN — LACOSAMIDE 100 MG: 100 TABLET, FILM COATED ORAL at 09:03

## 2024-03-03 RX ADMIN — INSULIN ASPART 6 UNITS: 100 INJECTION, SOLUTION INTRAVENOUS; SUBCUTANEOUS at 08:03

## 2024-03-03 RX ADMIN — AMIODARONE HYDROCHLORIDE 200 MG: 200 TABLET ORAL at 08:03

## 2024-03-03 RX ADMIN — APIXABAN 5 MG: 5 TABLET, FILM COATED ORAL at 09:03

## 2024-03-03 RX ADMIN — HYDRALAZINE HYDROCHLORIDE 75 MG: 50 TABLET ORAL at 08:03

## 2024-03-03 RX ADMIN — SUCRALFATE 1 G: 1 TABLET ORAL at 05:03

## 2024-03-03 RX ADMIN — ONDANSETRON 4 MG: 2 INJECTION INTRAMUSCULAR; INTRAVENOUS at 06:03

## 2024-03-03 RX ADMIN — Medication 500 MG: at 08:03

## 2024-03-03 RX ADMIN — MELATONIN TAB 3 MG 6 MG: 3 TAB at 10:03

## 2024-03-03 RX ADMIN — ONDANSETRON 4 MG: 2 INJECTION INTRAMUSCULAR; INTRAVENOUS at 12:03

## 2024-03-03 RX ADMIN — ACETAMINOPHEN 500 MG: 500 TABLET ORAL at 03:03

## 2024-03-03 RX ADMIN — AMLODIPINE BESYLATE 5 MG: 5 TABLET ORAL at 08:03

## 2024-03-03 RX ADMIN — ACETAMINOPHEN 500 MG: 500 TABLET ORAL at 06:03

## 2024-03-03 RX ADMIN — ACETAMINOPHEN 500 MG: 500 TABLET ORAL at 09:03

## 2024-03-03 RX ADMIN — SUCRALFATE 1 G: 1 TABLET ORAL at 06:03

## 2024-03-03 NOTE — PLAN OF CARE
Problem: Adult Inpatient Plan of Care  Goal: Plan of Care Review  Outcome: Ongoing, Progressing     Problem: Infection  Goal: Absence of Infection Signs and Symptoms  Outcome: Ongoing, Progressing     Problem: Fall Injury Risk  Goal: Absence of Fall and Fall-Related Injury  Outcome: Ongoing, Progressing     Problem: Syncope  Goal: Absence of Syncopal Symptoms  Outcome: Ongoing, Progressing     Problem: Pain Acute  Goal: Acceptable Pain Control and Functional Ability  Outcome: Ongoing, Progressing

## 2024-03-03 NOTE — ASSESSMENT & PLAN NOTE
- Will need updated MAR from [facility] - missing page 1,  amiodarone listed on documentation I have,  Our prior med list shows use of Metoprolol and Diltiazem  - Will hold starting any AV-node blocking agents for now  (BB, CCB)  - Patient asymptomatic currently  - Continue amiodarone.

## 2024-03-03 NOTE — ASSESSMENT & PLAN NOTE
- Continue IV Ceftriaxone, sot 2/29  - UCx NG  - BCx with A urinae and Diptheroids (likely contaminant)   - Follows with urology currently and is undergoing outpatient w/u for hematuria

## 2024-03-03 NOTE — PROGRESS NOTES
[facility] [address] [place] [address] Medicine  Progress Note    Patient Name: Kamra Muñoz  MRN: [id number]  Patient Class: OP- Observation   Admission Date: 2/29/2024  Length of Stay: 0 days  Attending Physician: Giovanni Thomas MD  Primary Care Provider: Basim Guerrero MD        Subjective:     Principal Problem:Pre-syncope        HPI:  79 y/o WM with CAD, Atrial Fibrillation, hx of Embolic Stroke, Type 2 Diabetes presents to the ED from [facility] for concerns of hypotension, ?pre-syncope, cyanosis.  Per Nursing home note, patient was eating in dining room today when he had c/o of back pain then leaned forward and lips turned cyanotic. Initial vitals pulse Ox 72% T 97.4, HR 72, /88, RR 22,  EMS was called and patient placed on 5Liters oxygen, O2 sat improved to 98%.  His blood glucose was 243.   Transported to the ED.     On arrival to the ED, patient was awake/alert, reported feeling weak, initial VS here hypotensive 90/46 and HR 50 - sinus bradycardia on EKG.  No report of LOC, patient w/o any chest pain, nausea/vomiting, diaphoresis, felt light headed, but denied vision changes.   Over the last week he reports c/o of diarrhea - having 3+ loose bowel movements per day.  His Nursing home MAR has daily miralax listed, he reports going through cycles where bowel regimen causes loose stools but without it he will quickly become constipated.  No reported recent hospitalizations.   He denies any dysuria, but notes nocturia.     ADLs, patient will ambulate with PT and only with assistance, does not use a cane/walker, requires assistance to transfer from bed to wheelchair.  He can feed himself.  He's not aware of all outpatient medication names.     Of Nursing home records only Page 2/3 of MAR available, Page 1 is missing.     Overview/Hospital Course:  No notes on file    Interval History:   No events overnight. Patient with no complaints. Pending culture data. Continue CTX.        Review of Systems   Constitutional:  Positive for activity change. Negative for appetite change, chills and fever.   HENT: Negative.     Respiratory: Negative.     Cardiovascular: Negative.    Gastrointestinal: Negative.    Neurological:  Positive for weakness. Negative for syncope, speech difficulty and light-headedness.   Psychiatric/Behavioral:  Negative for confusion.      Objective:     Vital Signs (Most Recent):  Temp: 98 °F (36.7 °C) (03/03/24 1135)  Pulse: 87 (03/03/24 1523)  Resp: 18 (03/03/24 1135)  BP: (!) 114/57 (03/03/24 1135)  SpO2: 95 % (03/03/24 1135) Vital Signs (24h Range):  Temp:  [98 °F (36.7 °C)-98.5 °F (36.9 °C)] 98 °F (36.7 °C)  Pulse:  [] 87  Resp:  [16-18] 18  SpO2:  [93 %-99 %] 95 %  BP: (108-147)/(57-72) 114/57     Weight: 81.3 kg (179 lb 4.5 oz)  Body mass index is 23.02 kg/m².    Intake/Output Summary (Last 24 hours) at 3/3/2024 1556  Last data filed at 3/3/2024 0420  Gross per 24 hour   Intake 150 ml   Output 800 ml   Net -650 ml         Physical Exam  Vitals and nursing note reviewed.   Constitutional:       General: He is not in acute distress.  HENT:      Mouth/Throat:      Mouth: Mucous membranes are moist.      Pharynx: No oropharyngeal exudate or posterior oropharyngeal erythema.   Eyes:      General: No scleral icterus.     Extraocular Movements: Extraocular movements intact.   Cardiovascular:      Rate and Rhythm: Normal rate and regular rhythm.   Pulmonary:      Effort: Pulmonary effort is normal.      Breath sounds: Normal breath sounds.   Abdominal:      General: Bowel sounds are normal. There is no distension.      Palpations: Abdomen is soft.      Tenderness: There is no abdominal tenderness. There is no guarding or rebound.   Musculoskeletal:      Right lower leg: No edema.      Left lower leg: No edema.      Comments: Muscle wasting in LE   Neurological:      General: No focal deficit present.      Mental Status: He is alert.             Significant Labs: All  pertinent labs within the past 24 hours have been reviewed.  CBC:   Recent Labs   Lab 03/02/24  0816 03/03/24  0433   WBC 6.91 7.30   HGB 12.2* 11.9*   HCT 38.8* 37.2*    249     CMP:   Recent Labs   Lab 03/02/24  0816 03/03/24  0433   * 134*   K 4.4 3.9    100   CO2 24 23   * 171*   BUN 11 15   CREATININE 0.9 1.1   CALCIUM 8.5* 8.9   ALBUMIN 2.7* 2.7*   ANIONGAP 10 11       Significant Imaging: I have reviewed all pertinent imaging results/findings within the past 24 hours.    Assessment/Plan:      * Pre-syncope  Etiology based on nursing home records and presenting vitals/symptoms,  patient with transient hypoxemia cause is unclear and subsequent hypotension.     - Bradycardia present is sinus bradycardia, EKG shows chronic inferolateral T-wave inversions, no new TWI and no ST segment changes, no anginal symptoms   - Continue on cardiac monitoring  - Hold any av maria alejandra agents such as beta blocker/calcium channel blocker at this time  - Given patient cannot stand independently, orthostatic BP deferred, but patient was in seated position to eat during occurrence.   - Hypotension has resolved with 1L fluid bolus    Bradycardia  - Will need updated MAR from [facility] - missing page 1,  amiodarone listed on documentation I have,  Our prior med list shows use of Metoprolol and Diltiazem  - Will hold starting any AV-node blocking agents for now  (BB, CCB)  - Patient asymptomatic currently  - Continue amiodarone.         Acute cystitis with hematuria  - Continue IV Ceftriaxone, sot 2/29  - UCx NG  - BCx with A urinae and Diptheroids (likely contaminant)   - Follows with urology currently and is undergoing outpatient w/u for hematuria      Essential hypertension  Chronic, uncontrolled. Latest blood pressure and vitals reviewed-     Temp:  [97.6 °F (36.4 °C)-98.7 °F (37.1 °C)]   Pulse:  [53-74]   Resp:  [14-20]   BP: (164-198)/(78-94)   SpO2:  [93 %-96 %] .   Home meds for hypertension were  reviewed and noted below.   Hypertension Medications               nitroGLYCERIN (NITROSTAT) 0.4 MG SL tablet Place 1 tablet (0.4 mg total) under the tongue every 5 (five) minutes as needed for Chest pain.    losartan (COZAAR) 25 MG tablet Take 25 mg by mouth once daily.    metoprolol succinate (TOPROL-XL) 25 MG 24 hr tablet Take 25 mg by mouth once daily.    metoprolol tartrate (LOPRESSOR) 50 MG tablet Take 50 mg by mouth Daily.            While in the hospital, will manage blood pressure as follows; Adjust home antihypertensive regimen as follows- Resume only losartan  Amlodipine added    Diarrhea  -Hold scheduled bowel regimen  -Stool studies ordered in ED - add stool culture,  discontinue Ova/Parasites/Giardia/Cryptospodirium studies  -continue with other ordered studies, rule out C.diff         Hypomagnesemia  Patient has Abnormal Magnesium: hypomagnesemia. Will continue to monitor electrolytes closely. Will replace the affected electrolytes and repeat labs to be done after interventions completed. The patient's magnesium results have been reviewed and are listed below.  Recent Labs   Lab 02/29/24  1424   MG 1.3*      Etiology - suspect related to reported diarrhea    Hypokalemia  Patient has hypokalemia which is Acute and currently uncontrolled. Most recent potassium levels reviewed-   Lab Results   Component Value Date    K 3.0 (L) 02/29/2024   . Will continue potassium replacement per protocol and recheck repeat levels after replacement completed.     Given total 50meq potassium repletion in ED (10meq IV, 40meq PO)   Trend chemistry panels    Hypotension due to hypovolemia  Etiology may be due to use of anti-hypertensive medications and hypovolemia from patient's reported diarrhea symptoms over the last week  -hypotension has resolved with 1Liter of Iv fluids tonight   -more recent blood pressures now reading hypertensive      Paroxysmal atrial fibrillation  Patient with Persistent (7 days or more) atrial  "fibrillation which is controlled currently with Amiodarone. Patient is currently in sinus rhythm.[id number] Score: 4. HASBLED Score: . Anticoagulation indicated. Anticoagulation done with Apixaban .    History of embolic stroke  Hx of atrial fibrillation  Chronically on apixaban.       Type 2 diabetes mellitus with hyperglycemia, with long-term current use of insulin  Patient's FSGs are uncontrolled due to hyperglycemia on current medication regimen.  Last A1c reviewed-   Lab Results   Component Value Date    HGBA1C 8.7 (H) 02/29/2024     Most recent fingerstick glucose reviewed- No results for input(s): "POCTGLUCOSE" in the last 24 hours.  Current correctional scale  Low  Maintain anti-hyperglycemic dose as follows-   Antihyperglycemics (From admission, onward)      Start     Stop Route Frequency Ordered    03/01/24 0900  insulin detemir U-100 (Levemir) pen 10 Units         -- SubQ Daily 02/29/24 2241    03/01/24 0715  insulin aspart U-100 pen 4 Units         -- SubQ 3 times daily with meals 02/29/24 2211    02/29/24 2017  insulin aspart U-100 pen 0-5 Units         -- SubQ Before meals & nightly PRN 02/29/24 1918          Hold Oral hypoglycemics while patient is in the hospital.          VTE Risk Mitigation (From admission, onward)           Ordered     apixaban tablet 5 mg  2 times daily         02/29/24 2015     Reason for No Pharmacological VTE Prophylaxis  Once        Question:  Reasons:  Answer:  Already adequately anticoagulated on oral Anticoagulants    02/29/24 2015     IP VTE HIGH RISK PATIENT  Once         02/29/24 2015     Place sequential compression device  Until discontinued         02/29/24 2015                    Discharge Planning   ALLIE: 3/3/2024     Code Status: Full Code   Is the patient medically ready for discharge?: No    Reason for patient still in hospital (select all that apply): Patient trending condition, Laboratory test, Treatment, and Pending disposition  Discharge Plan A: Return to " nursing [place]                  Giovanni Thomas MD  Department of Hospital Medicine   [facility] - Observation 11[facility]

## 2024-03-03 NOTE — SUBJECTIVE & OBJECTIVE
Interval History:   No events overnight. Patient with no complaints. Pending culture data. Continue CTX.       Review of Systems   Constitutional:  Positive for activity change. Negative for appetite change, chills and fever.   HENT: Negative.     Respiratory: Negative.     Cardiovascular: Negative.    Gastrointestinal: Negative.    Neurological:  Positive for weakness. Negative for syncope, speech difficulty and light-headedness.   Psychiatric/Behavioral:  Negative for confusion.      Objective:     Vital Signs (Most Recent):  Temp: 98 °F (36.7 °C) (03/03/24 1135)  Pulse: 87 (03/03/24 1523)  Resp: 18 (03/03/24 1135)  BP: (!) 114/57 (03/03/24 1135)  SpO2: 95 % (03/03/24 1135) Vital Signs (24h Range):  Temp:  [98 °F (36.7 °C)-98.5 °F (36.9 °C)] 98 °F (36.7 °C)  Pulse:  [] 87  Resp:  [16-18] 18  SpO2:  [93 %-99 %] 95 %  BP: (108-147)/(57-72) 114/57     Weight: 81.3 kg (179 lb 4.5 oz)  Body mass index is 23.02 kg/m².    Intake/Output Summary (Last 24 hours) at 3/3/2024 1556  Last data filed at 3/3/2024 0420  Gross per 24 hour   Intake 150 ml   Output 800 ml   Net -650 ml         Physical Exam  Vitals and nursing note reviewed.   Constitutional:       General: He is not in acute distress.  HENT:      Mouth/Throat:      Mouth: Mucous membranes are moist.      Pharynx: No oropharyngeal exudate or posterior oropharyngeal erythema.   Eyes:      General: No scleral icterus.     Extraocular Movements: Extraocular movements intact.   Cardiovascular:      Rate and Rhythm: Normal rate and regular rhythm.   Pulmonary:      Effort: Pulmonary effort is normal.      Breath sounds: Normal breath sounds.   Abdominal:      General: Bowel sounds are normal. There is no distension.      Palpations: Abdomen is soft.      Tenderness: There is no abdominal tenderness. There is no guarding or rebound.   Musculoskeletal:      Right lower leg: No edema.      Left lower leg: No edema.      Comments: Muscle wasting in LE   Neurological:       General: No focal deficit present.      Mental Status: He is alert.             Significant Labs: All pertinent labs within the past 24 hours have been reviewed.  CBC:   Recent Labs   Lab 03/02/24  0816 03/03/24  0433   WBC 6.91 7.30   HGB 12.2* 11.9*   HCT 38.8* 37.2*    249     CMP:   Recent Labs   Lab 03/02/24  0816 03/03/24  0433   * 134*   K 4.4 3.9    100   CO2 24 23   * 171*   BUN 11 15   CREATININE 0.9 1.1   CALCIUM 8.5* 8.9   ALBUMIN 2.7* 2.7*   ANIONGAP 10 11       Significant Imaging: I have reviewed all pertinent imaging results/findings within the past 24 hours.

## 2024-03-04 VITALS
SYSTOLIC BLOOD PRESSURE: 105 MMHG | RESPIRATION RATE: 18 BRPM | OXYGEN SATURATION: 93 % | BODY MASS INDEX: 23.01 KG/M2 | WEIGHT: 179.25 LBS | HEART RATE: 89 BPM | HEIGHT: 74 IN | DIASTOLIC BLOOD PRESSURE: 54 MMHG | TEMPERATURE: 98 F

## 2024-03-04 LAB
ALBUMIN SERPL BCP-MCNC: 2.6 G/DL (ref 3.5–5.2)
ANION GAP SERPL CALC-SCNC: 11 MMOL/L (ref 8–16)
BASOPHILS # BLD AUTO: 0.04 K/UL (ref 0–0.2)
BASOPHILS NFR BLD: 0.6 % (ref 0–1.9)
BUN SERPL-MCNC: 21 MG/DL (ref 8–23)
CALCIUM SERPL-MCNC: 8.7 MG/DL (ref 8.7–10.5)
CHLORIDE SERPL-SCNC: 101 MMOL/L (ref 95–110)
CO2 SERPL-SCNC: 22 MMOL/L (ref 23–29)
CREAT SERPL-MCNC: 1.2 MG/DL (ref 0.5–1.4)
DIFFERENTIAL METHOD BLD: ABNORMAL
EOSINOPHIL # BLD AUTO: 0.4 K/UL (ref 0–0.5)
EOSINOPHIL NFR BLD: 5.3 % (ref 0–8)
ERYTHROCYTE [DISTWIDTH] IN BLOOD BY AUTOMATED COUNT: 15.9 % (ref 11.5–14.5)
EST. GFR  (NO RACE VARIABLE): >60 ML/MIN/1.73 M^2
GLUCOSE SERPL-MCNC: 304 MG/DL (ref 70–110)
HCT VFR BLD AUTO: 37.2 % (ref 40–54)
HGB BLD-MCNC: 11.6 G/DL (ref 14–18)
IMM GRANULOCYTES # BLD AUTO: 0.01 K/UL (ref 0–0.04)
IMM GRANULOCYTES NFR BLD AUTO: 0.1 % (ref 0–0.5)
LYMPHOCYTES # BLD AUTO: 1.5 K/UL (ref 1–4.8)
LYMPHOCYTES NFR BLD: 21.4 % (ref 18–48)
MAGNESIUM SERPL-MCNC: 1.6 MG/DL (ref 1.6–2.6)
MCH RBC QN AUTO: 26.1 PG (ref 27–31)
MCHC RBC AUTO-ENTMCNC: 31.2 G/DL (ref 32–36)
MCV RBC AUTO: 84 FL (ref 82–98)
MONOCYTES # BLD AUTO: 0.7 K/UL (ref 0.3–1)
MONOCYTES NFR BLD: 10.1 % (ref 4–15)
NEUTROPHILS # BLD AUTO: 4.3 K/UL (ref 1.8–7.7)
NEUTROPHILS NFR BLD: 62.5 % (ref 38–73)
NRBC BLD-RTO: 0 /100 WBC
PHOSPHATE SERPL-MCNC: 3.7 MG/DL (ref 2.7–4.5)
PLATELET # BLD AUTO: 235 K/UL (ref 150–450)
PMV BLD AUTO: 10.2 FL (ref 9.2–12.9)
POCT GLUCOSE: 311 MG/DL (ref 70–110)
POCT GLUCOSE: 380 MG/DL (ref 70–110)
POTASSIUM SERPL-SCNC: 4.8 MMOL/L (ref 3.5–5.1)
RBC # BLD AUTO: 4.45 M/UL (ref 4.6–6.2)
SODIUM SERPL-SCNC: 134 MMOL/L (ref 136–145)
WBC # BLD AUTO: 6.92 K/UL (ref 3.9–12.7)

## 2024-03-04 PROCEDURE — G0378 HOSPITAL OBSERVATION PER HR: HCPCS

## 2024-03-04 PROCEDURE — 25000003 PHARM REV CODE 250: Performed by: STUDENT IN AN ORGANIZED HEALTH CARE EDUCATION/TRAINING PROGRAM

## 2024-03-04 PROCEDURE — 36415 COLL VENOUS BLD VENIPUNCTURE: CPT | Performed by: STUDENT IN AN ORGANIZED HEALTH CARE EDUCATION/TRAINING PROGRAM

## 2024-03-04 PROCEDURE — 25000003 PHARM REV CODE 250: Performed by: HOSPITALIST

## 2024-03-04 PROCEDURE — 83735 ASSAY OF MAGNESIUM: CPT | Performed by: STUDENT IN AN ORGANIZED HEALTH CARE EDUCATION/TRAINING PROGRAM

## 2024-03-04 PROCEDURE — 80069 RENAL FUNCTION PANEL: CPT | Performed by: STUDENT IN AN ORGANIZED HEALTH CARE EDUCATION/TRAINING PROGRAM

## 2024-03-04 PROCEDURE — 85025 COMPLETE CBC W/AUTO DIFF WBC: CPT | Performed by: STUDENT IN AN ORGANIZED HEALTH CARE EDUCATION/TRAINING PROGRAM

## 2024-03-04 RX ADMIN — INSULIN ASPART 10 UNITS: 100 INJECTION, SOLUTION INTRAVENOUS; SUBCUTANEOUS at 08:03

## 2024-03-04 RX ADMIN — APIXABAN 5 MG: 5 TABLET, FILM COATED ORAL at 08:03

## 2024-03-04 RX ADMIN — LOSARTAN POTASSIUM 50 MG: 50 TABLET, FILM COATED ORAL at 08:03

## 2024-03-04 RX ADMIN — SUCRALFATE 1 G: 1 TABLET ORAL at 12:03

## 2024-03-04 RX ADMIN — INSULIN ASPART 8 UNITS: 100 INJECTION, SOLUTION INTRAVENOUS; SUBCUTANEOUS at 12:03

## 2024-03-04 RX ADMIN — AMIODARONE HYDROCHLORIDE 200 MG: 200 TABLET ORAL at 08:03

## 2024-03-04 RX ADMIN — ACETAMINOPHEN 500 MG: 500 TABLET ORAL at 03:03

## 2024-03-04 RX ADMIN — HYDRALAZINE HYDROCHLORIDE 75 MG: 50 TABLET ORAL at 08:03

## 2024-03-04 RX ADMIN — SUCRALFATE 1 G: 1 TABLET ORAL at 06:03

## 2024-03-04 RX ADMIN — LACOSAMIDE 100 MG: 100 TABLET, FILM COATED ORAL at 08:03

## 2024-03-04 RX ADMIN — AMLODIPINE BESYLATE 5 MG: 5 TABLET ORAL at 08:03

## 2024-03-04 RX ADMIN — INSULIN ASPART 6 UNITS: 100 INJECTION, SOLUTION INTRAVENOUS; SUBCUTANEOUS at 12:03

## 2024-03-04 RX ADMIN — ACETAMINOPHEN 500 MG: 500 TABLET ORAL at 05:03

## 2024-03-04 RX ADMIN — INSULIN ASPART 6 UNITS: 100 INJECTION, SOLUTION INTRAVENOUS; SUBCUTANEOUS at 08:03

## 2024-03-04 RX ADMIN — Medication 500 MG: at 08:03

## 2024-03-04 NOTE — PLAN OF CARE
Problem: Adult Inpatient Plan of Care  Goal: Plan of Care Review  Outcome: Ongoing, Progressing     Problem: Adult Inpatient Plan of Care  Goal: Optimal Comfort and Wellbeing  Outcome: Ongoing, Progressing     Problem: Fall Injury Risk  Goal: Absence of Fall and Fall-Related Injury  Outcome: Ongoing, Progressing     Problem: Syncope  Goal: Absence of Syncopal Symptoms  Outcome: Ongoing, Progressing

## 2024-03-04 NOTE — PLAN OF CARE
Problem: Adult Inpatient Plan of Care  Goal: Plan of Care Review  Outcome: Ongoing, Progressing  Goal: Patient-Specific Goal (Individualized)  Outcome: Ongoing, Progressing  Goal: Absence of Hospital-Acquired Illness or Injury  Outcome: Ongoing, Progressing  Goal: Optimal Comfort and Wellbeing  Outcome: Ongoing, Progressing  Goal: Readiness for Transition of Care  Outcome: Ongoing, Progressing     Problem: Adjustment to Illness (Delirium)  Goal: Optimal Coping  Outcome: Ongoing, Progressing     Problem: Altered Behavior (Delirium)  Goal: Improved Behavioral Control  Outcome: Ongoing, Progressing     Problem: Attention and Thought Clarity Impairment (Delirium)  Goal: Improved Attention and Thought Clarity  Outcome: Ongoing, Progressing     Problem: Sleep Disturbance (Delirium)  Goal: Improved Sleep  Outcome: Ongoing, Progressing     Problem: Infection  Goal: Absence of Infection Signs and Symptoms  Outcome: Ongoing, Progressing     Problem: Diabetes Comorbidity  Goal: Blood Glucose Level Within Targeted Range  Outcome: Ongoing, Progressing     Problem: Impaired Wound Healing  Goal: Optimal Wound Healing  Outcome: Ongoing, Progressing     Problem: Fall Injury Risk  Goal: Absence of Fall and Fall-Related Injury  Outcome: Ongoing, Progressing     Problem: Hypertension Acute  Goal: Blood Pressure Within Desired Range  Outcome: Ongoing, Progressing     Problem: Adjustment to Device (Cardiac Rhythm Management Device)  Goal: Optimal Adjustment to Device  Outcome: Ongoing, Progressing

## 2024-03-04 NOTE — PLAN OF CARE
Patient is not medically ready to discharge. Disp: Return to [facility].  staff is following for discharge needs.    Lynn Hahn RN  Weekend  - [facility]  Spectra: [phone]

## 2024-03-04 NOTE — PLAN OF CARE
CM was notified of placement. Patient was accepted to return to [facility] on 3/4/24 . Report can be called to [phone] ask for the [facility] nurse. Pt will go to  112   arranged  ambulance  transport via [facility]. Requested  time is 1430   .  Requested  time does not guarantee arrival time.  If transport does not arrive by  1530  please contact assigned SW or on-call for assistance.    Patient's bedside nurse and team notified of the above.     03/04/24 1424   Post-Acute Status   Post-Acute Authorization Placement   Post-Acute Placement Status Set-up Complete/Auth obtained   Discharge Plan   Discharge Plan A Return to nursing home   Discharge Plan B Return to Nursing Home     No future appointments.  Pt discharged home with no services.

## 2024-03-04 NOTE — PLAN OF CARE
Problem: Adult Inpatient Plan of Care  Goal: Plan of Care Review  3/4/2024 1320 by Hyun Lynn RN  Outcome: Met  3/4/2024 1032 by Hyun Lynn RN  Outcome: Ongoing, Progressing  Goal: Patient-Specific Goal (Individualized)  3/4/2024 1320 by Hyun Lynn, RN  Outcome: Met  3/4/2024 1032 by Hyun Lynn RN  Outcome: Ongoing, Progressing  Goal: Absence of Hospital-Acquired Illness or Injury  3/4/2024 1320 by Hyun Lynn, RN  Outcome: Met  3/4/2024 1032 by Hyun Lynn, SYO  Outcome: Ongoing, Progressing  Goal: Optimal Comfort and Wellbeing  3/4/2024 1320 by Hyun Lynn RN  Outcome: Met  3/4/2024 1032 by Hyun Lynn, SOY  Outcome: Ongoing, Progressing  Goal: Readiness for Transition of Care  3/4/2024 1320 by Hyun Lynn, RN  Outcome: Met  3/4/2024 1032 by Hyun Lynn RN  Outcome: Ongoing, Progressing     Problem: Adjustment to Illness (Delirium)  Goal: Optimal Coping  3/4/2024 1320 by Hyun Lynn RN  Outcome: Met  3/4/2024 1032 by Hyun Lynn, SOY  Outcome: Ongoing, Progressing     Problem: Altered Behavior (Delirium)  Goal: Improved Behavioral Control  3/4/2024 1320 by Hyun Lynn RN  Outcome: Met  3/4/2024 1032 by Hyun Lynn RN  Outcome: Ongoing, Progressing     Problem: Attention and Thought Clarity Impairment (Delirium)  Goal: Improved Attention and Thought Clarity  3/4/2024 1320 by Hyun Lynn, RN  Outcome: Met  3/4/2024 1032 by Hyun Lynn RN  Outcome: Ongoing, Progressing     Problem: Sleep Disturbance (Delirium)  Goal: Improved Sleep  3/4/2024 1320 by Hyun Lynn, RN  Outcome: Met  3/4/2024 1032 by Hyun Lynn RN  Outcome: Ongoing, Progressing     Problem: Infection  Goal: Absence of Infection Signs and Symptoms  3/4/2024 1320 by Hyun Lynn, RN  Outcome: Met  3/4/2024 1032 by Hyun Lynn, RN  Outcome: Ongoing, Progressing     Problem: Diabetes Comorbidity  Goal: Blood Glucose Level Within Targeted  Range  3/4/2024 1320 by Hyun Lynn RN  Outcome: Met  3/4/2024 1032 by Hyun Lynn RN  Outcome: Ongoing, Progressing     Problem: Impaired Wound Healing  Goal: Optimal Wound Healing  3/4/2024 1320 by Hyun Lynn, RN  Outcome: Met  3/4/2024 1032 by Hyun Lynn, SOY  Outcome: Ongoing, Progressing     Problem: Fall Injury Risk  Goal: Absence of Fall and Fall-Related Injury  3/4/2024 1320 by Hyun Lynn, RN  Outcome: Met  3/4/2024 1032 by Hyun Lynn RN  Outcome: Ongoing, Progressing     Problem: Hypertension Acute  Goal: Blood Pressure Within Desired Range  3/4/2024 1320 by Hyun Lynn, RN  Outcome: Met  3/4/2024 1032 by Hyun Lynn RN  Outcome: Ongoing, Progressing     Problem: Adjustment to Device (Cardiac Rhythm Management Device)  Goal: Optimal Adjustment to Device  3/4/2024 1320 by Hyun Lynn, RN  Outcome: Met  3/4/2024 1032 by Hyun Lynn RN  Outcome: Ongoing, Progressing     Problem: Bleeding (Cardiac Rhythm Management Device)  Goal: Absence of Bleeding  3/4/2024 1320 by Hyun Lynn RN  Outcome: Met  3/4/2024 1032 by Hyun Lynn RN  Outcome: Ongoing, Progressing     Problem: Device-Related Complication Risk (Cardiac Rhythm Management Device)  Goal: Effective Device Function  3/4/2024 1320 by Hyun Lynn RN  Outcome: Met  3/4/2024 1032 by Hyun Lynn RN  Outcome: Ongoing, Progressing     Problem: Infection (Cardiac Rhythm Management Device)  Goal: Absence of Infection Signs and Symptoms  3/4/2024 1320 by Hyun Lynn RN  Outcome: Met  3/4/2024 1032 by Hyun Lynn RN  Outcome: Ongoing, Progressing     Problem: Pain (Cardiac Rhythm Management Device)  Goal: Acceptable Pain Level  3/4/2024 1320 by Hyun Lynn RN  Outcome: Met  3/4/2024 1032 by Lynn, Hyun T, RN  Outcome: Ongoing, Progressing     Problem: Respiratory Compromise (Cardiac Rhythm Management Device)  Goal: Effective Oxygenation and Ventilation  3/4/2024  1320 by Hyun Lynn RN  Outcome: Met  3/4/2024 1032 by Hyun Lynn RN  Outcome: Ongoing, Progressing     Problem: Syncope  Goal: Absence of Syncopal Symptoms  3/4/2024 1320 by Hyun Lynn RN  Outcome: Met  3/4/2024 1032 by Hyun Lynn RN  Outcome: Ongoing, Progressing     Problem: Pain Acute  Goal: Acceptable Pain Control and Functional Ability  3/4/2024 1320 by Hyun Lynn RN  Outcome: Met  3/4/2024 1032 by Hyun Lynn RN  Outcome: Ongoing, Progressing

## 2024-03-04 NOTE — NURSING
Report called to Nurse Trammell at [facility]. All questions answered. Patient AAOx4 waiting on transportation back to facility. No acute distress noted. PIV and tele monitor removed.

## 2024-03-04 NOTE — NURSING
Nurses Note -- 4 Eyes      3/4/2024   12:39 AM      Skin assessed during: Daily Assessment      [] No Altered Skin Integrity Present    []Prevention Measures Documented      [x] Yes- Altered Skin Integrity Present or Discovered   [] LDA Added if Not in Epic (Describe Wound)   [] New Altered Skin Integrity was Present on Admit and Documented in LDA   [] Wound Image Taken    Wound Care Consulted? No    Attending Nurse:  Trista Castillo RN/Staff Member:  Minnie    No new skin problems since admit

## 2024-03-05 LAB
BACTERIA BLD CULT: ABNORMAL

## 2024-03-05 NOTE — DISCHARGE SUMMARY
[facility] [address] [place] [address] Medicine  Discharge Summary      Patient Name: Kamar Muñoz  MRN: [id number]  JAMEEL: [id number]  Patient Class: OP- Observation  Admission Date: 2/29/2024  Hospital Length of Stay: 0 days  Discharge Date and Time: 3/4/2024  3:44 PM  Attending Physician: No att. providers found   Discharging Provider: Giovanni Thomas MD  Primary Care Provider: Basim Guerrero MD  [facility] Medicine Team: [facility]  Giovanni Thomas MD  Primary Care Team: [facility] G    HPI:   77 y/o WM with CAD, Atrial Fibrillation, hx of Embolic Stroke, Type 2 Diabetes presents to the ED from [facility] for concerns of hypotension, ?pre-syncope, cyanosis.  Per Nursing home note, patient was eating in dining room today when he had c/o of back pain then leaned forward and lips turned cyanotic. Initial vitals pulse Ox 72% T 97.4, HR 72, /88, RR 22,  EMS was called and patient placed on 5Liters oxygen, O2 sat improved to 98%.  His blood glucose was 243.   Transported to the ED.     On arrival to the ED, patient was awake/alert, reported feeling weak, initial VS here hypotensive 90/46 and HR 50 - sinus bradycardia on EKG.  No report of LOC, patient w/o any chest pain, nausea/vomiting, diaphoresis, felt light headed, but denied vision changes.   Over the last week he reports c/o of diarrhea - having 3+ loose bowel movements per day.  His Nursing home MAR has daily miralax listed, he reports going through cycles where bowel regimen causes loose stools but without it he will quickly become constipated.  No reported recent hospitalizations.   He denies any dysuria, but notes nocturia.     ADLs, patient will ambulate with PT and only with assistance, does not use a cane/walker, requires assistance to transfer from bed to wheelchair.  He can feed himself.  He's not aware of all outpatient medication names.     Of Nursing home records only Page 2/3 of MAR available, Page 1 is  missing.     * No surgery found *      Hospital Course:   Patient is presyncope symptoms and bradycardia improved with holding beta-blocker and IV fluid hydration.  Patient continued on amiodarone for history of AFib.  Treated with ceftriaxone for UTI.  Blood culture with contaminant.  Discharged back to nursing facility.  Metoprolol held at discharge.  Follow-up requested.    I personally saw, performed physical exam, and evaluated patient on day of discharge.       Goals of Care Treatment Preferences:  Code Status: Full Code      Consults:     No new Assessment & Plan notes have been filed under this hospital service since the last note was generated.  Service: Hospital Medicine    Final Active Diagnoses:    Diagnosis Date Noted POA    PRINCIPAL PROBLEM:  Pre-syncope [R55] 02/29/2024 Yes    Bradycardia [R00.1] 02/29/2024 Yes    Acute cystitis with hematuria [N30.01] 02/29/2024 Yes    Essential hypertension [I10] 07/20/2012 Yes     Chronic    Hypotension due to hypovolemia [I95.89, E86.1] 02/29/2024 Yes    Hypokalemia [E87.6] 02/29/2024 Yes    Hypomagnesemia [E83.42] 02/29/2024 Yes    Diarrhea [R19.7] 02/29/2024 Yes    Paroxysmal atrial fibrillation [I48.0] 01/12/2022 Yes     Chronic    History of embolic stroke [Z86.73] 01/12/2022 Not Applicable     Chronic    Coronary artery disease involving native coronary artery of native heart with unstable angina pectoris [I25.110] 01/09/2022 Yes     Chronic    Type 2 diabetes mellitus with hyperglycemia, with long-term current use of insulin [E11.65, Z79.4] 03/08/2013 Not Applicable     Chronic      Problems Resolved During this Admission:       Discharged Condition: good    Disposition: Skilled Nursing Facility    Follow Up:    Patient Instructions:      Ambulatory referral/consult to Internal Medicine   Standing Status: Future   Referral Priority: Routine Referral Type: Consultation   Referral Reason: Specialty Services Required   Requested Specialty: Internal Medicine    Number of Visits Requested: 1     Diet Adult Regular     Notify your health care provider if you experience any of the following:  increased confusion or weakness     Notify your health care provider if you experience any of the following:  persistent dizziness, light-headedness, or visual disturbances     Notify your health care provider if you experience any of the following:  worsening rash     Notify your health care provider if you experience any of the following:  severe persistent headache     Notify your health care provider if you experience any of the following:  difficulty breathing or increased cough     Notify your health care provider if you experience any of the following:  redness, tenderness, or signs of infection (pain, swelling, redness, odor or green/yellow discharge around incision site)     Notify your health care provider if you experience any of the following:  severe uncontrolled pain     Notify your health care provider if you experience any of the following:  persistent nausea and vomiting or diarrhea     Notify your health care provider if you experience any of the following:  temperature >100.4     Activity as tolerated       Significant Diagnostic Studies: Labs: All labs within the past 24 hours have been reviewed    Pending Diagnostic Studies:       None           Medications:  Reconciled Home Medications:      Medication List        CONTINUE taking these medications      acetaminophen 500 MG tablet  Commonly known as: TYLENOL  Take 1 tablet (500 mg total) by mouth every 8 (eight) hours.     alendronate 70 MG tablet  Commonly known as: FOSAMAX  Take 70 mg by mouth every 7 days.     amiodarone 200 MG Tab  Commonly known as: PACERONE  Take 1 tablet (200 mg total) by mouth once daily.     ascorbic acid (vitamin C) 500 MG tablet  Commonly known as: VITAMIN C  Take 500 mg by mouth 2 (two) times daily.     atorvastatin 40 MG tablet  Commonly known as: LIPITOR  Take 1 tablet (40 mg total)  by mouth once daily.     ELIQUIS 5 mg Tab  Generic drug: apixaban  Take 5 mg by mouth 2 (two) times daily.     glucose 4 GM chewable tablet  Take 6 tablets (24 g total) by mouth as needed for Low blood sugar (< 50).     insulin detemir U-100 (Levemir) 100 unit/mL (3 mL) Inpn pen  Inject 14 Units into the skin once daily.     insulin lispro 100 unit/mL injection  Inject 4 Units into the skin 3 (three) times daily before meals.     lacosamide 100 mg Tab  Commonly known as: VIMPAT  Take 100 mg by mouth every 12 (twelve) hours.     LANTUS SOLOSTAR U-100 INSULIN glargine 100 units/mL SubQ pen  Generic drug: insulin  Inject into the skin.     losartan 25 MG tablet  Commonly known as: COZAAR  Take 25 mg by mouth once daily.     metFORMIN 500 MG tablet  Commonly known as: GLUCOPHAGE  Take 500 mg by mouth 2 (two) times daily.     nitroGLYCERIN 0.4 MG SL tablet  Commonly known as: NITROSTAT  Place 1 tablet (0.4 mg total) under the tongue every 5 (five) minutes as needed for Chest pain.     ondansetron 4 MG tablet  Commonly known as: ZOFRAN  Take 4 mg by mouth every 6 (six) hours as needed for Nausea.     pantoprazole 40 MG tablet  Commonly known as: PROTONIX  Take 1 tablet (40 mg total) by mouth once daily.     polyethylene glycol 17 gram/dose powder  Commonly known as: GLYCOLAX  Use cap to measure out (17 g) mix with a liquid and take by mouth once daily.     SITagliptin phosphate 50 MG Tab  Commonly known as: JANUVIA  Take 1 tablet (50 mg total) by mouth once daily.     sucralfate 1 gram tablet  Commonly known as: CARAFATE  Take 1 g by mouth 3 (three) times daily before meals.     tamsulosin 0.4 mg Cap  Commonly known as: FLOMAX  Take 1 capsule by mouth once daily.     vitamin D 1000 units Tab  Commonly known as: VITAMIN D3  Take 1,000 Units by mouth once daily.            STOP taking these medications      gabapentin 100 MG capsule  Commonly known as: NEURONTIN     metoprolol succinate 25 MG 24 hr tablet  Commonly known  as: TOPROL-XL     metoprolol tartrate 50 MG tablet  Commonly known as: LOPRESSOR              Indwelling Lines/Drains at time of discharge:   Lines/Drains/Airways       None                   Time spent on the discharge of patient: 35 minutes         Giovanni Thomas MD  Department of Hospital Medicine  [facility]

## 2024-03-05 NOTE — HOSPITAL COURSE
Patient is presyncope symptoms and bradycardia improved with holding beta-blocker and IV fluid hydration.  Patient continued on amiodarone for history of AFib.  Treated with ceftriaxone for UTI.  Blood culture with contaminant.  Discharged back to nursing facility.  Metoprolol held at discharge.  Follow-up requested.    I personally saw, performed physical exam, and evaluated patient on day of discharge.

## 2024-03-15 ENCOUNTER — HOSPITAL ENCOUNTER (INPATIENT)
Facility: HOSPITAL | Age: 79
LOS: 5 days | Discharge: NURSING, REHAB OR LONG-TERM CARE | DRG: 637 | End: 2024-03-20
Attending: STUDENT IN AN ORGANIZED HEALTH CARE EDUCATION/TRAINING PROGRAM | Admitting: INTERNAL MEDICINE
Payer: MEDICARE

## 2024-03-15 DIAGNOSIS — R41.82 AMS (ALTERED MENTAL STATUS): ICD-10-CM

## 2024-03-15 DIAGNOSIS — E11.10 DIABETIC KETOACIDOSIS WITHOUT COMA ASSOCIATED WITH TYPE 2 DIABETES MELLITUS: ICD-10-CM

## 2024-03-15 DIAGNOSIS — E11.51 TYPE 2 DIABETES MELLITUS WITH DIABETIC PERIPHERAL ANGIOPATHY WITHOUT GANGRENE, WITH LONG-TERM CURRENT USE OF INSULIN: Chronic | ICD-10-CM

## 2024-03-15 DIAGNOSIS — R11.10 VOMITING: ICD-10-CM

## 2024-03-15 DIAGNOSIS — R07.9 CHEST PAIN: ICD-10-CM

## 2024-03-15 DIAGNOSIS — Z79.4 TYPE 2 DIABETES MELLITUS WITH DIABETIC PERIPHERAL ANGIOPATHY WITHOUT GANGRENE, WITH LONG-TERM CURRENT USE OF INSULIN: Chronic | ICD-10-CM

## 2024-03-15 DIAGNOSIS — R19.7 DIARRHEA, UNSPECIFIED TYPE: ICD-10-CM

## 2024-03-15 DIAGNOSIS — K92.1 BLOOD IN STOOL: Primary | ICD-10-CM

## 2024-03-15 PROBLEM — R57.1 HYPOVOLEMIC SHOCK: Status: ACTIVE | Noted: 2024-03-15

## 2024-03-15 LAB
ALBUMIN SERPL BCP-MCNC: 2.6 G/DL (ref 3.5–5.2)
ALLENS TEST: ABNORMAL
ALLENS TEST: ABNORMAL
ALP SERPL-CCNC: 131 U/L (ref 55–135)
ALT SERPL W/O P-5'-P-CCNC: 13 U/L (ref 10–44)
ANION GAP SERPL CALC-SCNC: 30 MMOL/L (ref 8–16)
AST SERPL-CCNC: 18 U/L (ref 10–40)
B-OH-BUTYR BLD STRIP-SCNC: 6.1 MMOL/L (ref 0–0.5)
BASOPHILS # BLD AUTO: 0.05 K/UL (ref 0–0.2)
BASOPHILS NFR BLD: 0.6 % (ref 0–1.9)
BILIRUB SERPL-MCNC: 0.4 MG/DL (ref 0.1–1)
BNP SERPL-MCNC: 104 PG/ML (ref 0–99)
BUN SERPL-MCNC: 36 MG/DL (ref 8–23)
CALCIUM SERPL-MCNC: 8.6 MG/DL (ref 8.7–10.5)
CHLORIDE SERPL-SCNC: 99 MMOL/L (ref 95–110)
CO2 SERPL-SCNC: 6 MMOL/L (ref 23–29)
CREAT SERPL-MCNC: 2.1 MG/DL (ref 0.5–1.4)
DIFFERENTIAL METHOD BLD: ABNORMAL
EOSINOPHIL # BLD AUTO: 0 K/UL (ref 0–0.5)
EOSINOPHIL NFR BLD: 0.1 % (ref 0–8)
ERYTHROCYTE [DISTWIDTH] IN BLOOD BY AUTOMATED COUNT: 17 % (ref 11.5–14.5)
EST. GFR  (NO RACE VARIABLE): 31.2 ML/MIN/1.73 M^2
GLUCOSE SERPL-MCNC: 826 MG/DL (ref 70–110)
GLUCOSE SERPL-MCNC: 857 MG/DL (ref 70–110)
HCO3 UR-SCNC: 4.6 MMOL/L (ref 24–28)
HCO3 UR-SCNC: 8.1 MMOL/L (ref 24–28)
HCT VFR BLD AUTO: 36.5 % (ref 40–54)
HGB BLD-MCNC: 10.7 G/DL (ref 14–18)
IMM GRANULOCYTES # BLD AUTO: 0.04 K/UL (ref 0–0.04)
IMM GRANULOCYTES NFR BLD AUTO: 0.5 % (ref 0–0.5)
INFLUENZA A, MOLECULAR: NEGATIVE
INFLUENZA B, MOLECULAR: NEGATIVE
LACTATE SERPL-SCNC: 7.5 MMOL/L (ref 0.5–2.2)
LIPASE SERPL-CCNC: 5 U/L (ref 4–60)
LYMPHOCYTES # BLD AUTO: 1.1 K/UL (ref 1–4.8)
LYMPHOCYTES NFR BLD: 12.3 % (ref 18–48)
MAGNESIUM SERPL-MCNC: 1.8 MG/DL (ref 1.6–2.6)
MCH RBC QN AUTO: 26.4 PG (ref 27–31)
MCHC RBC AUTO-ENTMCNC: 29.3 G/DL (ref 32–36)
MCV RBC AUTO: 90 FL (ref 82–98)
MONOCYTES # BLD AUTO: 0.4 K/UL (ref 0.3–1)
MONOCYTES NFR BLD: 4.5 % (ref 4–15)
NEUTROPHILS # BLD AUTO: 7.3 K/UL (ref 1.8–7.7)
NEUTROPHILS NFR BLD: 82 % (ref 38–73)
NRBC BLD-RTO: 0 /100 WBC
PCO2 BLDA: 18.7 MMHG (ref 35–45)
PCO2 BLDA: 24.9 MMHG (ref 35–45)
PH SMN: 7 [PH] (ref 7.35–7.45)
PH SMN: 7.12 [PH] (ref 7.35–7.45)
PHOSPHATE SERPL-MCNC: 6.3 MG/DL (ref 2.7–4.5)
PLATELET # BLD AUTO: 242 K/UL (ref 150–450)
PMV BLD AUTO: 10.6 FL (ref 9.2–12.9)
PO2 BLDA: 79 MMHG (ref 40–60)
PO2 BLDA: 84 MMHG (ref 40–60)
POC BE: -21 MMOL/L
POC BE: -27 MMOL/L
POC SATURATED O2: 87 % (ref 95–100)
POC SATURATED O2: 92 % (ref 95–100)
POC TCO2: 5 MMOL/L (ref 24–29)
POC TCO2: 9 MMOL/L (ref 24–29)
POCT GLUCOSE: >500 MG/DL (ref 70–110)
POCT GLUCOSE: >500 MG/DL (ref 70–110)
POTASSIUM SERPL-SCNC: 5.3 MMOL/L (ref 3.5–5.1)
PROT SERPL-MCNC: 6.4 G/DL (ref 6–8.4)
RBC # BLD AUTO: 4.05 M/UL (ref 4.6–6.2)
SAMPLE: ABNORMAL
SAMPLE: ABNORMAL
SARS-COV-2 RDRP RESP QL NAA+PROBE: NEGATIVE
SITE: ABNORMAL
SITE: ABNORMAL
SODIUM SERPL-SCNC: 135 MMOL/L (ref 136–145)
SPECIMEN SOURCE: NORMAL
TROPONIN I SERPL DL<=0.01 NG/ML-MCNC: <0.006 NG/ML (ref 0–0.03)
TSH SERPL DL<=0.005 MIU/L-ACNC: 0.41 UIU/ML (ref 0.4–4)
WBC # BLD AUTO: 8.87 K/UL (ref 3.9–12.7)

## 2024-03-15 PROCEDURE — 81001 URINALYSIS AUTO W/SCOPE: CPT | Performed by: STUDENT IN AN ORGANIZED HEALTH CARE EDUCATION/TRAINING PROGRAM

## 2024-03-15 PROCEDURE — 85025 COMPLETE CBC W/AUTO DIFF WBC: CPT | Performed by: STUDENT IN AN ORGANIZED HEALTH CARE EDUCATION/TRAINING PROGRAM

## 2024-03-15 PROCEDURE — U0002 COVID-19 LAB TEST NON-CDC: HCPCS | Performed by: STUDENT IN AN ORGANIZED HEALTH CARE EDUCATION/TRAINING PROGRAM

## 2024-03-15 PROCEDURE — 96361 HYDRATE IV INFUSION ADD-ON: CPT

## 2024-03-15 PROCEDURE — 99900035 HC TECH TIME PER 15 MIN (STAT)

## 2024-03-15 PROCEDURE — 83880 ASSAY OF NATRIURETIC PEPTIDE: CPT | Performed by: STUDENT IN AN ORGANIZED HEALTH CARE EDUCATION/TRAINING PROGRAM

## 2024-03-15 PROCEDURE — 82947 ASSAY GLUCOSE BLOOD QUANT: CPT | Performed by: STUDENT IN AN ORGANIZED HEALTH CARE EDUCATION/TRAINING PROGRAM

## 2024-03-15 PROCEDURE — 96365 THER/PROPH/DIAG IV INF INIT: CPT

## 2024-03-15 PROCEDURE — 82803 BLOOD GASES ANY COMBINATION: CPT

## 2024-03-15 PROCEDURE — 87106 FUNGI IDENTIFICATION YEAST: CPT | Mod: 59 | Performed by: STUDENT IN AN ORGANIZED HEALTH CARE EDUCATION/TRAINING PROGRAM

## 2024-03-15 PROCEDURE — 93010 ELECTROCARDIOGRAM REPORT: CPT | Mod: ,,, | Performed by: INTERNAL MEDICINE

## 2024-03-15 PROCEDURE — 83690 ASSAY OF LIPASE: CPT | Performed by: STUDENT IN AN ORGANIZED HEALTH CARE EDUCATION/TRAINING PROGRAM

## 2024-03-15 PROCEDURE — 25000003 PHARM REV CODE 250

## 2024-03-15 PROCEDURE — 87502 INFLUENZA DNA AMP PROBE: CPT | Performed by: STUDENT IN AN ORGANIZED HEALTH CARE EDUCATION/TRAINING PROGRAM

## 2024-03-15 PROCEDURE — 82962 GLUCOSE BLOOD TEST: CPT

## 2024-03-15 PROCEDURE — 83605 ASSAY OF LACTIC ACID: CPT | Performed by: STUDENT IN AN ORGANIZED HEALTH CARE EDUCATION/TRAINING PROGRAM

## 2024-03-15 PROCEDURE — 12000002 HC ACUTE/MED SURGE SEMI-PRIVATE ROOM

## 2024-03-15 PROCEDURE — 83605 ASSAY OF LACTIC ACID: CPT | Mod: 91 | Performed by: STUDENT IN AN ORGANIZED HEALTH CARE EDUCATION/TRAINING PROGRAM

## 2024-03-15 PROCEDURE — 84100 ASSAY OF PHOSPHORUS: CPT | Performed by: STUDENT IN AN ORGANIZED HEALTH CARE EDUCATION/TRAINING PROGRAM

## 2024-03-15 PROCEDURE — 93005 ELECTROCARDIOGRAM TRACING: CPT

## 2024-03-15 PROCEDURE — 80048 BASIC METABOLIC PNL TOTAL CA: CPT

## 2024-03-15 PROCEDURE — 99285 EMERGENCY DEPT VISIT HI MDM: CPT | Mod: 25

## 2024-03-15 PROCEDURE — 25000003 PHARM REV CODE 250: Performed by: STUDENT IN AN ORGANIZED HEALTH CARE EDUCATION/TRAINING PROGRAM

## 2024-03-15 PROCEDURE — 96375 TX/PRO/DX INJ NEW DRUG ADDON: CPT

## 2024-03-15 PROCEDURE — 84443 ASSAY THYROID STIM HORMONE: CPT | Performed by: STUDENT IN AN ORGANIZED HEALTH CARE EDUCATION/TRAINING PROGRAM

## 2024-03-15 PROCEDURE — 82010 KETONE BODYS QUAN: CPT | Performed by: STUDENT IN AN ORGANIZED HEALTH CARE EDUCATION/TRAINING PROGRAM

## 2024-03-15 PROCEDURE — 80053 COMPREHEN METABOLIC PANEL: CPT | Performed by: STUDENT IN AN ORGANIZED HEALTH CARE EDUCATION/TRAINING PROGRAM

## 2024-03-15 PROCEDURE — 96374 THER/PROPH/DIAG INJ IV PUSH: CPT | Mod: 59

## 2024-03-15 PROCEDURE — 87040 BLOOD CULTURE FOR BACTERIA: CPT | Mod: 59 | Performed by: STUDENT IN AN ORGANIZED HEALTH CARE EDUCATION/TRAINING PROGRAM

## 2024-03-15 PROCEDURE — 63600175 PHARM REV CODE 636 W HCPCS: Performed by: STUDENT IN AN ORGANIZED HEALTH CARE EDUCATION/TRAINING PROGRAM

## 2024-03-15 PROCEDURE — 83735 ASSAY OF MAGNESIUM: CPT | Performed by: STUDENT IN AN ORGANIZED HEALTH CARE EDUCATION/TRAINING PROGRAM

## 2024-03-15 PROCEDURE — 87086 URINE CULTURE/COLONY COUNT: CPT | Performed by: STUDENT IN AN ORGANIZED HEALTH CARE EDUCATION/TRAINING PROGRAM

## 2024-03-15 PROCEDURE — 87154 CUL TYP ID BLD PTHGN 6+ TRGT: CPT | Performed by: STUDENT IN AN ORGANIZED HEALTH CARE EDUCATION/TRAINING PROGRAM

## 2024-03-15 PROCEDURE — 87088 URINE BACTERIA CULTURE: CPT | Performed by: STUDENT IN AN ORGANIZED HEALTH CARE EDUCATION/TRAINING PROGRAM

## 2024-03-15 PROCEDURE — 84484 ASSAY OF TROPONIN QUANT: CPT | Performed by: STUDENT IN AN ORGANIZED HEALTH CARE EDUCATION/TRAINING PROGRAM

## 2024-03-15 RX ORDER — DEXTROSE MONOHYDRATE 100 MG/ML
INJECTION, SOLUTION INTRAVENOUS
Status: DISCONTINUED | OUTPATIENT
Start: 2024-03-15 | End: 2024-03-20 | Stop reason: HOSPADM

## 2024-03-15 RX ORDER — ACETAMINOPHEN 325 MG/1
650 TABLET ORAL EVERY 4 HOURS PRN
Status: DISCONTINUED | OUTPATIENT
Start: 2024-03-16 | End: 2024-03-20 | Stop reason: HOSPADM

## 2024-03-15 RX ORDER — NAPROXEN SODIUM 220 MG/1
81 TABLET, FILM COATED ORAL DAILY
Status: DISCONTINUED | OUTPATIENT
Start: 2024-03-16 | End: 2024-03-20

## 2024-03-15 RX ORDER — NOREPINEPHRINE BITARTRATE/D5W 4MG/250ML
PLASTIC BAG, INJECTION (ML) INTRAVENOUS
Status: COMPLETED
Start: 2024-03-15 | End: 2024-03-15

## 2024-03-15 RX ORDER — SODIUM CHLORIDE 9 MG/ML
INJECTION, SOLUTION INTRAVENOUS CONTINUOUS
Status: DISCONTINUED | OUTPATIENT
Start: 2024-03-16 | End: 2024-03-19

## 2024-03-15 RX ORDER — LACOSAMIDE 50 MG/1
100 TABLET ORAL EVERY 12 HOURS
Status: DISCONTINUED | OUTPATIENT
Start: 2024-03-15 | End: 2024-03-20 | Stop reason: HOSPADM

## 2024-03-15 RX ORDER — ONDANSETRON HYDROCHLORIDE 2 MG/ML
4 INJECTION, SOLUTION INTRAVENOUS
Status: COMPLETED | OUTPATIENT
Start: 2024-03-15 | End: 2024-03-15

## 2024-03-15 RX ORDER — ATORVASTATIN CALCIUM 40 MG/1
40 TABLET, FILM COATED ORAL DAILY
Status: DISCONTINUED | OUTPATIENT
Start: 2024-03-16 | End: 2024-03-20 | Stop reason: HOSPADM

## 2024-03-15 RX ORDER — NOREPINEPHRINE BITARTRATE/D5W 4MG/250ML
0-3 PLASTIC BAG, INJECTION (ML) INTRAVENOUS CONTINUOUS
Status: DISCONTINUED | OUTPATIENT
Start: 2024-03-15 | End: 2024-03-16

## 2024-03-15 RX ORDER — SODIUM CHLORIDE 0.9 % (FLUSH) 0.9 %
10 SYRINGE (ML) INJECTION
Status: DISCONTINUED | OUTPATIENT
Start: 2024-03-16 | End: 2024-03-20 | Stop reason: HOSPADM

## 2024-03-15 RX ORDER — ONDANSETRON HYDROCHLORIDE 2 MG/ML
4 INJECTION, SOLUTION INTRAVENOUS EVERY 6 HOURS PRN
Status: DISCONTINUED | OUTPATIENT
Start: 2024-03-16 | End: 2024-03-20 | Stop reason: HOSPADM

## 2024-03-15 RX ORDER — AMIODARONE HYDROCHLORIDE 200 MG/1
200 TABLET ORAL DAILY
Status: DISCONTINUED | OUTPATIENT
Start: 2024-03-16 | End: 2024-03-20 | Stop reason: HOSPADM

## 2024-03-15 RX ORDER — DEXTROSE MONOHYDRATE AND SODIUM CHLORIDE 5; .45 G/100ML; G/100ML
125 INJECTION, SOLUTION INTRAVENOUS CONTINUOUS PRN
Status: DISCONTINUED | OUTPATIENT
Start: 2024-03-16 | End: 2024-03-17

## 2024-03-15 RX ADMIN — SODIUM CHLORIDE: 9 INJECTION, SOLUTION INTRAVENOUS at 11:03

## 2024-03-15 RX ADMIN — ONDANSETRON 4 MG: 2 INJECTION INTRAMUSCULAR; INTRAVENOUS at 08:03

## 2024-03-15 RX ADMIN — APIXABAN 5 MG: 5 TABLET, FILM COATED ORAL at 11:03

## 2024-03-15 RX ADMIN — SODIUM CHLORIDE, POTASSIUM CHLORIDE, SODIUM LACTATE AND CALCIUM CHLORIDE 1000 ML: 600; 310; 30; 20 INJECTION, SOLUTION INTRAVENOUS at 07:03

## 2024-03-15 RX ADMIN — PIPERACILLIN SODIUM AND TAZOBACTAM SODIUM 4.5 G: 4; .5 INJECTION, POWDER, FOR SOLUTION INTRAVENOUS at 07:03

## 2024-03-15 RX ADMIN — LACOSAMIDE 100 MG: 50 TABLET, FILM COATED ORAL at 11:03

## 2024-03-15 RX ADMIN — INSULIN HUMAN 0.1 UNITS/KG/HR: 1 INJECTION, SOLUTION INTRAVENOUS at 08:03

## 2024-03-15 RX ADMIN — VANCOMYCIN HYDROCHLORIDE 1750 MG: 500 INJECTION, POWDER, LYOPHILIZED, FOR SOLUTION INTRAVENOUS at 10:03

## 2024-03-15 RX ADMIN — NOREPINEPHRINE BITARTRATE 0.02 MCG/KG/MIN: 4 INJECTION, SOLUTION INTRAVENOUS at 08:03

## 2024-03-15 RX ADMIN — SODIUM CHLORIDE, POTASSIUM CHLORIDE, SODIUM LACTATE AND CALCIUM CHLORIDE 500 ML: 600; 310; 30; 20 INJECTION, SOLUTION INTRAVENOUS at 10:03

## 2024-03-15 NOTE — ED TRIAGE NOTES
Kamar Muñoz, a 78 y.o. male presents to the ED w/ complaint of hypotension.  Patient states he has been experincing nausea, vomiting and diarrhea for 5 days.  Patient CBG >500 upon arrival as well as systolic pressures between 70-80s.  Patient A&O x4 upon arrival as well as systolic in the 90s.        Triage note:  Chief Complaint   Patient presents with    Multiple Complaints     Arrives from [facility] via EMS for vomiting and hyperglycemia >500 starting today. Pt is also hypotensive with systolic between 75-85.      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    Hyperlipidemia     Hyperosmolar hyperglycemic state (HHS) 01/29/2022    Hypertension     Hypoglycemia unawareness associated with type 2 diabetes mellitus 04/08/2022    Kidney stone     Migraine headache     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 08/02/2012    Paroxysmal atrial fibrillation     Pressure sore     STEMI involving right coronary artery 01/09/2022    Type II or unspecified type diabetes mellitus with neurological manifestations, uncontrolled(250.62) 03/08/2013    Urinary tract infection     UTI (urinary tract infection) 02/28/2023

## 2024-03-15 NOTE — ED NOTES
I-STAT Chem-8+ Results:   Value Reference Range   Sodium 133 136-145 mmol/L   Potassium  5.1 3.5-5.1 mmol/L   Chloride 100  mmol/L   Ionized Calcium 1.09 1.06-1.42 mmol/L   CO2 (measured) 11 23-29 mmol/L   Glucose >700  mg/dL   BUN 31 6-30 mg/dL   Creatinine 1.8 0.5-1.4 mg/dL   Hematocrit 38 36-54%

## 2024-03-15 NOTE — ED PROVIDER NOTES
Encounter Date: 3/15/2024       History     Chief Complaint   Patient presents with    Multiple Complaints     Arrives from [facility] via EMS for vomiting and hyperglycemia >500 starting today. Pt is also hypotensive with systolic between 75-85.      HPI    78-year-old man with IDDM, CVA, bed-bound at baseline, HTN, nephrolithiasis, paroxysmal AFib on Eliquis, CAD s/p MI, presenting with hypotension, vomiting, and diarrhea from [facility].     Patient reports he has been having diarrhea about 5 times a day for the last several days, and vomited 1 time today, with some abdominal cramping.    Denies chest pain, denies shortness breath, denies headache, cough, fever or chills.         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    Hyperlipidemia     Hyperosmolar hyperglycemic state (HHS) 01/29/2022    Hypertension     Hypoglycemia unawareness associated with type 2 diabetes mellitus 04/08/2022    Kidney stone     Migraine headache     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 08/02/2012    Paroxysmal atrial fibrillation     Pressure sore     STEMI involving right coronary artery 01/09/2022    Type II or unspecified type diabetes mellitus with neurological manifestations, uncontrolled(250.62) 03/08/2013    Urinary tract infection     UTI (urinary tract infection) 02/28/2023     Past Surgical History:   Procedure Laterality Date    BACK SURGERY       CATARACT EXTRACTION W/  INTRAOCULAR LENS IMPLANT Right     Per Dr Romero note 2018    COLONOSCOPY N/A 2019    Procedure: COLONOSCOPY Suprep;  Surgeon: Anh Johnson MD;  Location: [facility] [place] ENDO;  Service: Endoscopy;  Laterality: N/A;    ESOPHAGOGASTRODUODENOSCOPY N/A 09/15/2022    Procedure: EGD (ESOPHAGOGASTRODUODENOSCOPY);  Surgeon: Dashawn Evans MD;  Location: [facility] [place] ENDO;  Service: Endoscopy;  Laterality: N/A;    EYE SURGERY      HERNIA REPAIR      INJECTION, SACROILIAC JOINT Left 2023    Procedure: INJECTION,SACROILIAC JOINT, LEFT SI;  Surgeon: Lucas Ramirez MD;  Location: [facility] PAIN MGT;  Service: Pain Management;  Laterality: Left;    KYPHOPLASTY, SPINE, LUMBAR N/A 2023    Procedure: KYPHOPLASTY, SPINE, LUMBAR;  Surgeon: Kimberly Spicer MD;  Location: [facility] OR;  Service: Neurosurgery;  Laterality: N/A;  Ane: GenBlood: Type & HoldPos: ProneRad: C-arm x 2 spec Equip: Depuy Synthes - Tray Cortez    LEFT HEART CATHETERIZATION Left 2022    Procedure: CATHETERIZATION, HEART, LEFT;  Surgeon: Will Hurst III, MD;  Location: [facility] [place] CATH LAB/EP;  Service: Cardiology;  Laterality: Left;    renal stones      SHOULDER OPEN ROTATOR CUFF REPAIR       Family History   Problem Relation Age of Onset    Diabetes Father     Prostate cancer Neg Hx     Kidney disease Neg Hx      Social History     Tobacco Use    Smoking status: Former     Current packs/day: 0.00     Average packs/day: 1.5 packs/day for 25.0 years (37.5 ttl pk-yrs)     Types: Cigarettes     Start date: 1958     Quit date: 1983     Years since quittin.2    Smokeless tobacco: Never   Substance Use Topics    Alcohol use: No    Drug use: No     Review of Systems    Physical Exam     Initial Vitals [03/15/24 1836]   BP Pulse Resp Temp SpO2   (!) 71/35 79 17 98.2 °F (36.8 °C) 100 %      MAP       --         Physical Exam    Constitutional:   Thin, frail-appearing   HENT:   Head: Normocephalic and atraumatic.   Dry  mucous membranes   Eyes: EOM are normal.   Neck: Neck supple.   Cardiovascular:  Normal rate and regular rhythm.           Pulmonary/Chest: Effort normal. He has no wheezes. He has no rhonchi. He has no rales.   Tachypneic   Abdominal: He exhibits no distension.   Nontender to diffuse palpation, no right upper quadrant tenderness   Genitourinary:    Genitourinary Comments: Light tan stool in rectal vault, no melena, no bright red blood, no groin/perineum rashes, fluctuance, or necrotic skin changes     Musculoskeletal:         General: No tenderness or edema.      Cervical back: Neck supple.     Neurological: He is alert.   Leg lift >5 seconds bilaterally, no pronator drift, AOx3, no nystgmus   Skin: Skin is warm and dry.         ED Course   Procedures  Labs Reviewed   LACTIC ACID, PLASMA - Abnormal; Notable for the following components:       Result Value    Lactate (Lactic Acid) 7.5 (*)     All other components within normal limits   COMPREHENSIVE METABOLIC PANEL - Abnormal; Notable for the following components:    Sodium 135 (*)     Potassium 5.3 (*)     CO2 6 (*)     Glucose 826 (*)     BUN 36 (*)     Creatinine 2.1 (*)     Calcium 8.6 (*)     Albumin 2.6 (*)     eGFR 31.2 (*)     Anion Gap 30 (*)     All other components within normal limits   CBC W/ AUTO DIFFERENTIAL - Abnormal; Notable for the following components:    RBC 4.05 (*)     Hemoglobin 10.7 (*)     Hematocrit 36.5 (*)     MCH 26.4 (*)     MCHC 29.3 (*)     RDW 17.0 (*)     Gran % 82.0 (*)     Lymph % 12.3 (*)     All other components within normal limits   B-TYPE NATRIURETIC PEPTIDE - Abnormal; Notable for the following components:     (*)     All other components within normal limits   BETA - HYDROXYBUTYRATE, SERUM - Abnormal; Notable for the following components:    Beta-Hydroxybutyrate 6.1 (*)     All other components within normal limits   PHOSPHORUS - Abnormal; Notable for the following components:    Phosphorus 6.3 (*)     All other  components within normal limits    Narrative:     ADD ON PHOS PER DR JOVAN MURILLO/ORDER# [id number] @ 21:01   GLUCOSE, RANDOM - Abnormal; Notable for the following components:    Glucose 857 (*)     All other components within normal limits   POCT GLUCOSE - Abnormal; Notable for the following components:    POCT Glucose >500 (*)     All other components within normal limits   ISTAT PROCEDURE - Abnormal; Notable for the following components:    POC PH 7.121 (*)     POC PCO2 24.9 (*)     POC PO2 84 (*)     POC HCO3 8.1 (*)     POC BE -21 (*)     POC TCO2 9 (*)     All other components within normal limits   POCT GLUCOSE - Abnormal; Notable for the following components:    POCT Glucose >500 (*)     All other components within normal limits   ISTAT PROCEDURE - Abnormal; Notable for the following components:    POC PH 7.000 (*)     POC PCO2 18.7 (*)     POC PO2 79 (*)     POC HCO3 4.6 (*)     POC BE -27 (*)     POC TCO2 5 (*)     All other components within normal limits   INFLUENZA A & B BY MOLECULAR   CULTURE, BLOOD   CULTURE, BLOOD   TSH   TROPONIN I   MAGNESIUM   LIPASE   SARS-COV-2 RNA AMPLIFICATION, QUAL   MAGNESIUM   PHOSPHORUS   URINALYSIS, REFLEX TO URINE CULTURE   PHOSPHORUS   POCT GLUCOSE MONITORING CONTINUOUS          Imaging Results              CT Abdomen Pelvis  Without Contrast (In process)  Result time 03/15/24 23:25:57                     CT Head Without Contrast (Final result)  Result time 03/15/24 23:19:20      Final result by Angel Luis Hugo MD (03/15/24 23:19:20)                   Impression:      No acute intracranial abnormalities.    Chronic changes, as above, similar compared to prior study.      Electronically signed by: Angel Luis Hugo MD  Date:    03/15/2024  Time:    23:19               Narrative:    EXAMINATION:  CT HEAD WITHOUT CONTRAST    CLINICAL HISTORY:  Mental status change, unknown cause;    TECHNIQUE:  Low dose axial images were obtained through the head.  Coronal and sagittal  reformations were also performed. Contrast was not administered.    COMPARISON:  03/23/2023.    FINDINGS:  Remote infarct lateral left cerebellum, similar to prior.  Large remote right frontal infarct (MCA distribution), similar to prior.  There is no evidence of acute infarct, hemorrhage, or mass.  There is ventricular and sulcal enlargement consistent with generalized atrophy.  Mild confluent decreased supratentorial white matter attenuation most likely related to chronic nonspecific small vessel disease.   No mass-effect or midline shift.  There are no abnormal extra-axial fluid collections.  The paranasal sinuses and mastoid air cells are essentially clear .  The calvarium appears intact.  .                                       X-Ray Chest AP Portable (Final result)  Result time 03/15/24 22:27:32      Final result by Angel Luis Hugo MD (03/15/24 22:27:32)                   Impression:      Chronic findings, as above, similar to prior to prior.    No significant change from prior study.      Electronically signed by: Angel Luis Hugo MD  Date:    03/15/2024  Time:    22:27               Narrative:    EXAMINATION:  XR CHEST AP PORTABLE    CLINICAL HISTORY:  Vomiting, unspecified    TECHNIQUE:  Single frontal view of the chest was performed.    COMPARISON:  02/29/2024.    FINDINGS:  Increased interstitial markings in the lungs, similar compared to prior.  Chronic blunting left CP angle, similar to prior.    Heart and lungs  appear unchanged when allowing for differences in technique and positioning.                                       Medications   dextrose 10 % infusion (has no administration in time range)   dextrose 10 % infusion (has no administration in time range)   vancomycin 1.75 g in 5 % dextrose 500 mL IVPB (1,750 mg Intravenous New Bag 3/15/24 2202)   insulin regular in 0.9 % NaCl 100 unit/100 mL (1 unit/mL) infusion (0.1 Units/kg/hr × 81.2 kg Intravenous No Dose/Rate Change 3/15/24 5811)    NORepinephrine 4 mg in dextrose 5% 250 mL infusion (premix) (0 mcg/kg/min × 81.2 kg Intravenous Paused 3/15/24 2244)   amiodarone tablet 200 mg (has no administration in time range)   aspirin chewable tablet 81 mg (has no administration in time range)   atorvastatin tablet 40 mg (has no administration in time range)   lacosamide tablet 100 mg (has no administration in time range)   apixaban tablet 5 mg (has no administration in time range)   lactated ringers bolus 1,000 mL (0 mLs Intravenous Stopped 3/15/24 2028)   piperacillin-tazobactam (ZOSYN) 4.5 g in dextrose 5 % in water (D5W) 100 mL IVPB (MB+) (0 g Intravenous Stopped 3/15/24 2028)   lactated ringers bolus 1,000 mL (0 mLs Intravenous Stopped 3/15/24 2055)   ondansetron injection 4 mg (4 mg Intravenous Given 3/15/24 2027)   NORepinephrine bitartrate-D5W 4 mg/250 mL (16 mcg/mL) infusion Soln (  Handoff 3/15/24 2119)   lactated ringers bolus 500 mL (500 mLs Intravenous New Bag 3/15/24 2204)     Medical Decision Making  78-year-old man with IDDM, CVA with residual left-sided deficits, bed-bound at baseline, HTN, nephrolithiasis, paroxysmal AFib on Eliquis, CAD s/p MI, presenting with hypotension, vomiting, and diarrhea from [facility].     Differential diagnosis includes but is not limited to:  DKA/HHS, UTI, intra-abdominal infection, c diff, GI bleed    POCT glucose done in ED with glucose > 500, has received about 500 cc of NS with EMS, will switch to lactated Ringer's in case of DKA to avoid further acidosis.    Rectal exam with light tan stool, no melena or bright red blood, less concern for GI bleed as cause for hypotension. No significant RUQ tenderness on exam, less concern for ascending cholangitis/cholecystitis. No leukocytosis on CBC, no further episodes of vomiting or diarrhea in ED.    Labs significant for acidosis with pH of 7.1, glucose 830s, anion gap of 30 with betahydroxybutyrate of 6, lactate of 7, consistent with DKA. Normal  potassium on iSTAT, ordered for insulin drip at .1u/kg/hr, covered with vanc, zosyn for broad spectrum coverage in case of infectious source inciting DKA, blood cultures obtained. Given a total of 3L of IVF in ED, with persistent hypotension MAP 60, started on levophed. Continues to have 98% saturation on room air, HR elevated to 100s. Mental status is waxing and waning, but continues to be oriented to self, birthday, following all commands, protecting airway with appropriate tachypnea. Discussed case with ICU who will evaluate the patient.    CTH, CTAP pending, CXR with no clear consolidation. Mccord placed, UA pending.    Anticipate admission to ICU for DKA treatment.    Remainder of care- CTH, CTAP, ICU evaluation, signed out to incoming ED team.      Please put in 40 minutes of critical care due to patient having DKA, requiring IV insulin.  Separate from teaching and exclusive of procedure and ekg time  Includes:  Time at bedside  Time reviewing test results  Time discussing case with staff  Time documenting the medical record  Time spent with family members  Time spent with consults  Management      Amount and/or Complexity of Data Reviewed  Labs: ordered. Decision-making details documented in ED Course.  Radiology: ordered.    Risk  Prescription drug management.  Decision regarding hospitalization.               ED Course as of 03/15/24 2335   Fri Mar 15, 2024   1900 POCT Glucose(!!): >500 [LF]   1933 Beta-Hydroxybutyrate(!): 6.1  Labs with acidosis pH of 7.1, beta hydroxybutyrate of 6.1, glucose of >700 consistent with DKA, 1 L lactated Ringer's currently being pressure bagged, received 500 cc NS by EMS, most recent echo done 2023 with slightly reduced EF 45% [LF]   1935 Will order for insulin drip [LF]   1949 Lactic Acid Level(!!): 7.5 [LF]   2059 Patient persistently hypotensive after 2.5 L of IV fluid 81/45 MAP 57, started on Levophed [LF]   2100 On bedside ultrasound, EF slightly reduced, no pericardial  tamponade, with 1-2 B-lines per rib space, IVC with some respiratory variability, not congested, will give an additional 500 cc of IV fluids [LF]   2100 Patient on Levophed drip at 0.04, on insulin drip 0.1 mg/kg [LF]      ED Course User Index  [LF] Elizabeth Knight MD                             Clinical Impression:  Final diagnoses:  [R41.82] AMS (altered mental status)  [R11.10] Vomiting          ED Disposition Condition    Admit Stable                Elizabeth Knight MD  03/15/24 9344

## 2024-03-16 LAB
ANION GAP SERPL CALC-SCNC: 12 MMOL/L (ref 8–16)
ANION GAP SERPL CALC-SCNC: 12 MMOL/L (ref 8–16)
ANION GAP SERPL CALC-SCNC: 22 MMOL/L (ref 8–16)
ANION GAP SERPL CALC-SCNC: 6 MMOL/L (ref 8–16)
ANION GAP SERPL CALC-SCNC: 8 MMOL/L (ref 8–16)
ANION GAP SERPL CALC-SCNC: ABNORMAL MMOL/L (ref 8–16)
ANION GAP SERPL CALC-SCNC: ABNORMAL MMOL/L (ref 8–16)
BACTERIA #/AREA URNS AUTO: ABNORMAL /HPF
BASOPHILS # BLD AUTO: 0.01 K/UL (ref 0–0.2)
BASOPHILS NFR BLD: 0.1 % (ref 0–1.9)
BILIRUB UR QL STRIP: NEGATIVE
BUN SERPL-MCNC: 25 MG/DL (ref 8–23)
BUN SERPL-MCNC: 26 MG/DL (ref 8–23)
BUN SERPL-MCNC: 28 MG/DL (ref 8–23)
BUN SERPL-MCNC: 30 MG/DL (ref 8–23)
BUN SERPL-MCNC: 35 MG/DL (ref 8–23)
CALCIUM SERPL-MCNC: 8.2 MG/DL (ref 8.7–10.5)
CALCIUM SERPL-MCNC: 8.3 MG/DL (ref 8.7–10.5)
CALCIUM SERPL-MCNC: 8.4 MG/DL (ref 8.7–10.5)
CALCIUM SERPL-MCNC: 8.4 MG/DL (ref 8.7–10.5)
CALCIUM SERPL-MCNC: 8.5 MG/DL (ref 8.7–10.5)
CHLORIDE SERPL-SCNC: 100 MMOL/L (ref 95–110)
CHLORIDE SERPL-SCNC: 100 MMOL/L (ref 95–110)
CHLORIDE SERPL-SCNC: 102 MMOL/L (ref 95–110)
CHLORIDE SERPL-SCNC: 107 MMOL/L (ref 95–110)
CHLORIDE SERPL-SCNC: 109 MMOL/L (ref 95–110)
CHLORIDE SERPL-SCNC: 110 MMOL/L (ref 95–110)
CHLORIDE SERPL-SCNC: 110 MMOL/L (ref 95–110)
CLARITY UR REFRACT.AUTO: ABNORMAL
CO2 SERPL-SCNC: 11 MMOL/L (ref 23–29)
CO2 SERPL-SCNC: 19 MMOL/L (ref 23–29)
CO2 SERPL-SCNC: 19 MMOL/L (ref 23–29)
CO2 SERPL-SCNC: 20 MMOL/L (ref 23–29)
CO2 SERPL-SCNC: 21 MMOL/L (ref 23–29)
CO2 SERPL-SCNC: <5 MMOL/L (ref 23–29)
CO2 SERPL-SCNC: <5 MMOL/L (ref 23–29)
COLOR UR AUTO: YELLOW
CREAT SERPL-MCNC: 1.6 MG/DL (ref 0.5–1.4)
CREAT SERPL-MCNC: 1.7 MG/DL (ref 0.5–1.4)
CREAT SERPL-MCNC: 1.9 MG/DL (ref 0.5–1.4)
CREAT SERPL-MCNC: 2 MG/DL (ref 0.5–1.4)
CREAT SERPL-MCNC: 2.4 MG/DL (ref 0.5–1.4)
DIFFERENTIAL METHOD BLD: ABNORMAL
EOSINOPHIL # BLD AUTO: 0 K/UL (ref 0–0.5)
EOSINOPHIL NFR BLD: 0 % (ref 0–8)
ERYTHROCYTE [DISTWIDTH] IN BLOOD BY AUTOMATED COUNT: 16.4 % (ref 11.5–14.5)
EST. GFR  (NO RACE VARIABLE): 26.6 ML/MIN/1.73 M^2
EST. GFR  (NO RACE VARIABLE): 33.1 ML/MIN/1.73 M^2
EST. GFR  (NO RACE VARIABLE): 35.2 ML/MIN/1.73 M^2
EST. GFR  (NO RACE VARIABLE): 40.2 ML/MIN/1.73 M^2
EST. GFR  (NO RACE VARIABLE): 43.3 ML/MIN/1.73 M^2
GLUCOSE SERPL-MCNC: 153 MG/DL (ref 70–110)
GLUCOSE SERPL-MCNC: 166 MG/DL (ref 70–110)
GLUCOSE SERPL-MCNC: 184 MG/DL (ref 70–110)
GLUCOSE SERPL-MCNC: 217 MG/DL (ref 70–110)
GLUCOSE SERPL-MCNC: 328 MG/DL (ref 70–110)
GLUCOSE SERPL-MCNC: 632 MG/DL (ref 70–110)
GLUCOSE SERPL-MCNC: 796 MG/DL (ref 70–110)
GLUCOSE SERPL-MCNC: 796 MG/DL (ref 70–110)
GLUCOSE UR QL STRIP: ABNORMAL
HCT VFR BLD AUTO: 30.9 % (ref 40–54)
HGB BLD-MCNC: 9.6 G/DL (ref 14–18)
HGB UR QL STRIP: ABNORMAL
HYALINE CASTS UR QL AUTO: 0 /LPF
IMM GRANULOCYTES # BLD AUTO: 0.06 K/UL (ref 0–0.04)
IMM GRANULOCYTES NFR BLD AUTO: 0.6 % (ref 0–0.5)
KETONES UR QL STRIP: ABNORMAL
LACTATE SERPL-SCNC: 2.4 MMOL/L (ref 0.5–2.2)
LACTATE SERPL-SCNC: 5.2 MMOL/L (ref 0.5–2.2)
LACTATE SERPL-SCNC: 8.3 MMOL/L (ref 0.5–2.2)
LACTATE SERPL-SCNC: 9.6 MMOL/L (ref 0.5–2.2)
LEUKOCYTE ESTERASE UR QL STRIP: ABNORMAL
LYMPHOCYTES # BLD AUTO: 0.5 K/UL (ref 1–4.8)
LYMPHOCYTES NFR BLD: 5 % (ref 18–48)
MAGNESIUM SERPL-MCNC: 1.6 MG/DL (ref 1.6–2.6)
MCH RBC QN AUTO: 26.7 PG (ref 27–31)
MCHC RBC AUTO-ENTMCNC: 31.1 G/DL (ref 32–36)
MCV RBC AUTO: 86 FL (ref 82–98)
MICROSCOPIC COMMENT: ABNORMAL
MONOCYTES # BLD AUTO: 0.7 K/UL (ref 0.3–1)
MONOCYTES NFR BLD: 6.1 % (ref 4–15)
NEUTROPHILS # BLD AUTO: 9.5 K/UL (ref 1.8–7.7)
NEUTROPHILS NFR BLD: 88.2 % (ref 38–73)
NITRITE UR QL STRIP: NEGATIVE
NRBC BLD-RTO: 0 /100 WBC
OHS QRS DURATION: 116 MS
OHS QRS DURATION: 132 MS
OHS QTC CALCULATION: 472 MS
OHS QTC CALCULATION: 507 MS
PH UR STRIP: 5 [PH] (ref 5–8)
PHOSPHATE SERPL-MCNC: 3.3 MG/DL (ref 2.7–4.5)
PHOSPHATE SERPL-MCNC: 6.5 MG/DL (ref 2.7–4.5)
PLATELET # BLD AUTO: 153 K/UL (ref 150–450)
PMV BLD AUTO: 11.5 FL (ref 9.2–12.9)
POCT GLUCOSE: 102 MG/DL (ref 70–110)
POCT GLUCOSE: 131 MG/DL (ref 70–110)
POCT GLUCOSE: 145 MG/DL (ref 70–110)
POCT GLUCOSE: 159 MG/DL (ref 70–110)
POCT GLUCOSE: 168 MG/DL (ref 70–110)
POCT GLUCOSE: 185 MG/DL (ref 70–110)
POCT GLUCOSE: 224 MG/DL (ref 70–110)
POCT GLUCOSE: 321 MG/DL (ref 70–110)
POCT GLUCOSE: 93 MG/DL (ref 70–110)
POCT GLUCOSE: >500 MG/DL (ref 70–110)
POTASSIUM SERPL-SCNC: 3.2 MMOL/L (ref 3.5–5.1)
POTASSIUM SERPL-SCNC: 3.4 MMOL/L (ref 3.5–5.1)
POTASSIUM SERPL-SCNC: 3.4 MMOL/L (ref 3.5–5.1)
POTASSIUM SERPL-SCNC: 4.1 MMOL/L (ref 3.5–5.1)
POTASSIUM SERPL-SCNC: 4.2 MMOL/L (ref 3.5–5.1)
POTASSIUM SERPL-SCNC: 4.2 MMOL/L (ref 3.5–5.1)
POTASSIUM SERPL-SCNC: 5 MMOL/L (ref 3.5–5.1)
PROT UR QL STRIP: ABNORMAL
RBC # BLD AUTO: 3.59 M/UL (ref 4.6–6.2)
RBC #/AREA URNS AUTO: >100 /HPF (ref 0–4)
SODIUM SERPL-SCNC: 135 MMOL/L (ref 136–145)
SODIUM SERPL-SCNC: 136 MMOL/L (ref 136–145)
SODIUM SERPL-SCNC: 138 MMOL/L (ref 136–145)
SODIUM SERPL-SCNC: 139 MMOL/L (ref 136–145)
SODIUM SERPL-SCNC: 140 MMOL/L (ref 136–145)
SP GR UR STRIP: 1.01 (ref 1–1.03)
TROPONIN I SERPL DL<=0.01 NG/ML-MCNC: 0.06 NG/ML (ref 0–0.03)
TROPONIN I SERPL DL<=0.01 NG/ML-MCNC: 0.14 NG/ML (ref 0–0.03)
URN SPEC COLLECT METH UR: ABNORMAL
VANCOMYCIN SERPL-MCNC: 11 UG/ML
WBC # BLD AUTO: 10.75 K/UL (ref 3.9–12.7)
WBC #/AREA URNS AUTO: >100 /HPF (ref 0–5)
WBC CLUMPS UR QL AUTO: ABNORMAL
YEAST UR QL AUTO: ABNORMAL

## 2024-03-16 PROCEDURE — 93010 ELECTROCARDIOGRAM REPORT: CPT | Mod: ,,, | Performed by: INTERNAL MEDICINE

## 2024-03-16 PROCEDURE — 63600175 PHARM REV CODE 636 W HCPCS: Performed by: STUDENT IN AN ORGANIZED HEALTH CARE EDUCATION/TRAINING PROGRAM

## 2024-03-16 PROCEDURE — 84100 ASSAY OF PHOSPHORUS: CPT

## 2024-03-16 PROCEDURE — 84484 ASSAY OF TROPONIN QUANT: CPT | Mod: 91

## 2024-03-16 PROCEDURE — 80202 ASSAY OF VANCOMYCIN: CPT | Performed by: STUDENT IN AN ORGANIZED HEALTH CARE EDUCATION/TRAINING PROGRAM

## 2024-03-16 PROCEDURE — 25000003 PHARM REV CODE 250

## 2024-03-16 PROCEDURE — 25000003 PHARM REV CODE 250: Performed by: STUDENT IN AN ORGANIZED HEALTH CARE EDUCATION/TRAINING PROGRAM

## 2024-03-16 PROCEDURE — S5010 5% DEXTROSE AND 0.45% SALINE: HCPCS

## 2024-03-16 PROCEDURE — 93005 ELECTROCARDIOGRAM TRACING: CPT

## 2024-03-16 PROCEDURE — 94761 N-INVAS EAR/PLS OXIMETRY MLT: CPT

## 2024-03-16 PROCEDURE — 80048 BASIC METABOLIC PNL TOTAL CA: CPT

## 2024-03-16 PROCEDURE — 83735 ASSAY OF MAGNESIUM: CPT

## 2024-03-16 PROCEDURE — 82947 ASSAY GLUCOSE BLOOD QUANT: CPT | Performed by: INTERNAL MEDICINE

## 2024-03-16 PROCEDURE — 83605 ASSAY OF LACTIC ACID: CPT

## 2024-03-16 PROCEDURE — 99222 1ST HOSP IP/OBS MODERATE 55: CPT | Mod: GC,,, | Performed by: INTERNAL MEDICINE

## 2024-03-16 PROCEDURE — 99233 SBSQ HOSP IP/OBS HIGH 50: CPT | Mod: GC,,, | Performed by: INTERNAL MEDICINE

## 2024-03-16 PROCEDURE — 63600175 PHARM REV CODE 636 W HCPCS

## 2024-03-16 PROCEDURE — 36415 COLL VENOUS BLD VENIPUNCTURE: CPT | Mod: XB

## 2024-03-16 PROCEDURE — 20600001 HC STEP DOWN PRIVATE ROOM

## 2024-03-16 PROCEDURE — 83605 ASSAY OF LACTIC ACID: CPT | Mod: 91 | Performed by: INTERNAL MEDICINE

## 2024-03-16 PROCEDURE — 85025 COMPLETE CBC W/AUTO DIFF WBC: CPT

## 2024-03-16 PROCEDURE — 36415 COLL VENOUS BLD VENIPUNCTURE: CPT | Performed by: STUDENT IN AN ORGANIZED HEALTH CARE EDUCATION/TRAINING PROGRAM

## 2024-03-16 PROCEDURE — 80048 BASIC METABOLIC PNL TOTAL CA: CPT | Mod: 91 | Performed by: INTERNAL MEDICINE

## 2024-03-16 RX ORDER — IBUPROFEN 200 MG
16 TABLET ORAL
Status: DISCONTINUED | OUTPATIENT
Start: 2024-03-16 | End: 2024-03-16

## 2024-03-16 RX ORDER — PROMETHAZINE HYDROCHLORIDE 6.25 MG/5ML
12.5 SYRUP ORAL EVERY 6 HOURS PRN
Status: DISCONTINUED | OUTPATIENT
Start: 2024-03-16 | End: 2024-03-20 | Stop reason: HOSPADM

## 2024-03-16 RX ORDER — POTASSIUM CHLORIDE 7.45 MG/ML
10 INJECTION INTRAVENOUS
Status: DISPENSED | OUTPATIENT
Start: 2024-03-16 | End: 2024-03-16

## 2024-03-16 RX ORDER — MAGNESIUM SULFATE HEPTAHYDRATE 40 MG/ML
2 INJECTION, SOLUTION INTRAVENOUS ONCE
Status: COMPLETED | OUTPATIENT
Start: 2024-03-16 | End: 2024-03-16

## 2024-03-16 RX ORDER — GLUCAGON 1 MG
1 KIT INJECTION
Status: DISCONTINUED | OUTPATIENT
Start: 2024-03-16 | End: 2024-03-17

## 2024-03-16 RX ORDER — IBUPROFEN 200 MG
24 TABLET ORAL
Status: DISCONTINUED | OUTPATIENT
Start: 2024-03-16 | End: 2024-03-16

## 2024-03-16 RX ORDER — IBUPROFEN 200 MG
24 TABLET ORAL
Status: DISCONTINUED | OUTPATIENT
Start: 2024-03-16 | End: 2024-03-17

## 2024-03-16 RX ORDER — INSULIN ASPART 100 [IU]/ML
0-5 INJECTION, SOLUTION INTRAVENOUS; SUBCUTANEOUS EVERY 6 HOURS
Status: DISCONTINUED | OUTPATIENT
Start: 2024-03-16 | End: 2024-03-17

## 2024-03-16 RX ORDER — IBUPROFEN 200 MG
16 TABLET ORAL
Status: DISCONTINUED | OUTPATIENT
Start: 2024-03-16 | End: 2024-03-17

## 2024-03-16 RX ORDER — POTASSIUM CHLORIDE 7.45 MG/ML
10 INJECTION INTRAVENOUS
Status: COMPLETED | OUTPATIENT
Start: 2024-03-16 | End: 2024-03-16

## 2024-03-16 RX ADMIN — VANCOMYCIN HYDROCHLORIDE 1500 MG: 1.5 INJECTION, POWDER, LYOPHILIZED, FOR SOLUTION INTRAVENOUS at 07:03

## 2024-03-16 RX ADMIN — POTASSIUM CHLORIDE 10 MEQ: 7.46 INJECTION, SOLUTION INTRAVENOUS at 01:03

## 2024-03-16 RX ADMIN — APIXABAN 2.5 MG: 2.5 TABLET, FILM COATED ORAL at 09:03

## 2024-03-16 RX ADMIN — POTASSIUM CHLORIDE 10 MEQ: 7.46 INJECTION, SOLUTION INTRAVENOUS at 04:03

## 2024-03-16 RX ADMIN — POTASSIUM CHLORIDE 10 MEQ: 7.46 INJECTION, SOLUTION INTRAVENOUS at 05:03

## 2024-03-16 RX ADMIN — INSULIN HUMAN 0.8 UNITS/HR: 1 INJECTION, SOLUTION INTRAVENOUS at 12:03

## 2024-03-16 RX ADMIN — PIPERACILLIN SODIUM AND TAZOBACTAM SODIUM 4.5 G: 4; .5 INJECTION, POWDER, FOR SOLUTION INTRAVENOUS at 11:03

## 2024-03-16 RX ADMIN — POTASSIUM CHLORIDE 10 MEQ: 7.46 INJECTION, SOLUTION INTRAVENOUS at 12:03

## 2024-03-16 RX ADMIN — MAGNESIUM SULFATE HEPTAHYDRATE 2 G: 40 INJECTION, SOLUTION INTRAVENOUS at 04:03

## 2024-03-16 RX ADMIN — INSULIN HUMAN 0.2 UNITS/KG/HR: 1 INJECTION, SOLUTION INTRAVENOUS at 03:03

## 2024-03-16 RX ADMIN — LACOSAMIDE 100 MG: 50 TABLET, FILM COATED ORAL at 09:03

## 2024-03-16 RX ADMIN — PIPERACILLIN SODIUM AND TAZOBACTAM SODIUM 4.5 G: 4; .5 INJECTION, POWDER, FOR SOLUTION INTRAVENOUS at 07:03

## 2024-03-16 RX ADMIN — POTASSIUM CHLORIDE 10 MEQ: 7.46 INJECTION, SOLUTION INTRAVENOUS at 11:03

## 2024-03-16 RX ADMIN — DEXTROSE AND SODIUM CHLORIDE 125 ML/HR: 5; 450 INJECTION, SOLUTION INTRAVENOUS at 07:03

## 2024-03-16 RX ADMIN — ONDANSETRON 4 MG: 2 INJECTION INTRAMUSCULAR; INTRAVENOUS at 03:03

## 2024-03-16 RX ADMIN — INSULIN HUMAN 0.2 UNITS/KG/HR: 1 INJECTION, SOLUTION INTRAVENOUS at 08:03

## 2024-03-16 RX ADMIN — DEXTROSE AND SODIUM CHLORIDE 125 ML/HR: 5; 450 INJECTION, SOLUTION INTRAVENOUS at 10:03

## 2024-03-16 RX ADMIN — PROMETHAZINE HYDROCHLORIDE 12.5 MG: 6.25 SOLUTION ORAL at 04:03

## 2024-03-16 RX ADMIN — SODIUM CHLORIDE: 9 INJECTION, SOLUTION INTRAVENOUS at 08:03

## 2024-03-16 RX ADMIN — POTASSIUM CHLORIDE 10 MEQ: 7.46 INJECTION, SOLUTION INTRAVENOUS at 03:03

## 2024-03-16 RX ADMIN — DEXTROSE AND SODIUM CHLORIDE 125 ML/HR: 5; 450 INJECTION, SOLUTION INTRAVENOUS at 12:03

## 2024-03-16 RX ADMIN — ATORVASTATIN CALCIUM 40 MG: 40 TABLET, FILM COATED ORAL at 09:03

## 2024-03-16 RX ADMIN — PIPERACILLIN SODIUM AND TAZOBACTAM SODIUM 4.5 G: 4; .5 INJECTION, POWDER, FOR SOLUTION INTRAVENOUS at 04:03

## 2024-03-16 RX ADMIN — POTASSIUM CHLORIDE 10 MEQ: 7.46 INJECTION, SOLUTION INTRAVENOUS at 02:03

## 2024-03-16 RX ADMIN — AMIODARONE HYDROCHLORIDE 200 MG: 200 TABLET ORAL at 09:03

## 2024-03-16 RX ADMIN — APIXABAN 2.5 MG: 2.5 TABLET, FILM COATED ORAL at 08:03

## 2024-03-16 RX ADMIN — ASPIRIN 81 MG CHEWABLE TABLET 81 MG: 81 TABLET CHEWABLE at 09:03

## 2024-03-16 RX ADMIN — SODIUM CHLORIDE 1000 ML: 9 INJECTION, SOLUTION INTRAVENOUS at 02:03

## 2024-03-16 RX ADMIN — POTASSIUM BICARBONATE 40 MEQ: 391 TABLET, EFFERVESCENT ORAL at 04:03

## 2024-03-16 NOTE — ED NOTES
Bedside handoff from [facility]    Nurses Note -- 4 Eyes      3/15/2024   9:27 PM      Skin assessed during: Q Shift Change      [] No Altered Skin Integrity Present    []Prevention Measures Documented      [x] Yes- Altered Skin Integrity Present or Discovered   [x] LDA Added if Not in Epic (Describe Wound)   [x] New Altered Skin Integrity was Present on Admit and Documented in LDA   [] Wound Image Taken  (will perform later, ASAP)    Wound Care Consulted? No    Attending Nurse:  Rylee Second RN/Staff Member:  Vi OLIVIER

## 2024-03-16 NOTE — ASSESSMENT & PLAN NOTE
78M with hx of T2DM who presents with hypotension, nausea/vomiting, and diarrhea. Pt denies any changes to his insulin and reports that he receives it regularly at his NH. However, he does not know his doses. Outside documentation suggests Levemir 30U and Aspart 4U with meals, though on our records, his home dose of levemir is 14U. Will go by weight based dosing  Initial labs with acidosis pH of 7.1, beta hydroxybutyrate of 6.1, lactate 7.5, and blood glucose of >700 consistent with DKA. Pt received 2L IVF initially in the ED with minimal improvement in BP so was started on peripheral levo. Cause of DKA may be an ongoing UTI; pt recently hospitalized for UTI and was discharged with PO antibiotics, however pt with cloudy and purulent appearing urine.    Recommendations:  - Continuous NS at 125 mL/hr. Once BGL <200, change IVF to D5 1/2 NS at 50 mL/hr  - Monitor for volume overload and pulmonary edema  - Insulin gtt per protocol, gtt @ 0.1U/kg/hr  - q1h glucose accuchecks while on insulin gtt  - Monitor electrolytes with BMP q4h while on insulin gtt and place on telemetry, replete K as needed if under 5.3  - Transition insulin gtt to subQ insulin when BGL is < 200mg/dL, and two of the following three are met: AG < 12, bicarb ? 18, and BGL less than 200 on two sequential reads while on steady insulin rate  - Diet NPO or clear liquids if pt tolerating and no n/v; advance as tolerated

## 2024-03-16 NOTE — ASSESSMENT & PLAN NOTE
"See "DKA" for specifics for management. Patient presented with hypotension and MAP < 60 in the ED, despite 2L IVF. Started on peripheral levophed. Lactic elevated at 7 and pt acidemic. Likely due to dehydration in DKA and continued GI losses from diarrhea; though there may be an underlying infectious etiology as well. On exam, patient with cloudy, purulent appearing urine and had been recently hospitalized for suspected UTI though Ucx was negative.     - Trend lactate to clearance  - IVF as needed for resuscitation  - Wean pressors as able  - Continuing Vanc and zosyn at this time, but would de-escalate if cultures remain negative.  - Follow-up cultures; tailor antibiotics according to results  "

## 2024-03-16 NOTE — NURSING TRANSFER
Nursing Transfer Note      3/16/2024   4:03 PM    Nurse giving handoff:abbe  Nurse receiving handoff:Nano    Reason patient is being transferred: step down    Transfer To: [id number]    Transfer via bed    Transfer with cardiac monitoring    Transported by RN    Transfer Vital Signs:  Blood Pressure:149/69  Heart Rate:69  O2:95  Temperature:97.6  Respirations:18    Telemetry: Box Number [id number]  Order for Tele Monitor? Yes    Additional Lines:     4eyes on Skin: yes    Medicines sent: yes    Any special needs or follow-up needed: no    Patient belongings transferred with patient: Yes    Chart send with patient: Yes    Notified:     Patient reassessed at: 1555 (date, time)  1  Upon arrival to floor: cardiac monitor applied, patient oriented to room, call bell in reach, and bed in lowest position

## 2024-03-16 NOTE — HPI
Kamar Muñoz is an 78-year-old man with IDDM, CVA with residual left-sided deficits, bed-bound at baseline, HTN, nephrolithiasis, paroxysmal AFib on Eliquis, CAD s/p MI, who presents with hypotension, vomiting, and diarrhea from NH. Patient reports he has been having diarrhea about 5 times a day for the last several days, and one episode of emesis today, with some abdominal cramping. He also reports ongoing dizziness and lightheadedness. He is a somewhat poor historian but he denies any recent changes to his medications. On review of systems, he denies SOB, chest pain, blood in his stool or vomit, and reports no abdominal pain at present. Patient was recently hospitalized from 2/29 to 3/4 due to episode of hypoxemia and hypotension, thought to be due to symptomatic bradycardia. He was discharged with his beta blocker discontinued, and received IVF and antibiotics for UTI.    In the ED, patient tachycardic HR 100s, hypotensive 71/35 and received 2L of LR with BP only temporarily responsive, so pt was started on peripheral levophed. Initial labs notable for K 5.3, Bicarb 6, , BHB 6.1. Lactate 7.5. pH 7.121, CO2 24.9. Patient admitted to the MICU for DKA and shock requiring pressors.

## 2024-03-16 NOTE — ASSESSMENT & PLAN NOTE
Debility    CVA with R frontal and L cerebellar infarcts on 1/12/2022  with residual LSW and physical debility as a result.    Plan:  - Continue ASA 81mg and Atorvastatin 80mg qd.  - Monitor for and prevent skin breakdown and pressure ulcers, wound care consult as needed  - Early mobility, OOB in chair at least 3 hours per day, repositioning/weight shifting every 20-30 minutes when sitting, turn patient every 2 hours, proper mattress/overlay and chair cushioning, pressure relief/heel protector boots  - DVT prophylaxis covered with Eliquis 2.5 BID (77 yo and Cr 2.1)

## 2024-03-16 NOTE — PROGRESS NOTES
Pharmacist Renal Dose Adjustment Note    Kamar Muñoz is a 78 y.o. male being treated with apixaban 5 mg twice daily for A.fib    Patient Data:    Vital Signs (Most Recent):  Temp: (!) 95.4 °F (35.2 °C) (03/15/24 2220)  Pulse: 104 (03/15/24 2220)  Resp: 20 (03/15/24 2220)  BP: (!) 109/54 (03/15/24 2212)  SpO2: 98 % (03/15/24 2220) Vital Signs (72h Range):  Temp:  [95.4 °F (35.2 °C)-98.2 °F (36.8 °C)]   Pulse:  []   Resp:  [17-26]   BP: ()/(35-57)   SpO2:  [97 %-100 %]      Recent Labs   Lab 03/15/24  1907   CREATININE 2.1*     Serum creatinine: 2.1 mg/dL (H) 03/15/24 1907  Estimated creatinine clearance: 32.2 mL/min (A)    Adjusted to   Apixaban 2.5 mg twice daily per protocol    Pharmacist's Name: Arpan Pitts  Pharmacist's Extension: [phone]

## 2024-03-16 NOTE — PROGRESS NOTES
"Pt told RN he feels "Chest pressure" and pointed in the center of his chest. RN asked if he felt it before or if it gradually came on. Pt state "it suddenly came on" Pt not diaphoretic but does have complaints of nausea at this time. Team 1 resident Dr. Diaz notified and orders received to do 12 lead  EKG, and for troponin labs to be sent as well as promethazine PRN added to MAR. Pt VSS stable at this time, see flowsheet. No further orders at this time.   03/16/24 0338   Pain/Comfort/Sleep   Preferred Pain Scale number (Numeric Rating Pain Scale)   Comfort/Acceptable Pain Level 2   Pain Rating (0-10): Rest 7   Quality pressure   Pain Onset/Duration Pt stated suddenly came on       "

## 2024-03-16 NOTE — ASSESSMENT & PLAN NOTE
Patient presented with 5 days of diarrhea from nursing home with DKA, hypotension requiring pressor support. Diarrhea apparently persistent issue as previous hospitalization also had report of diarrhea. Likely contributing to dehydration and hypovolemic state.    - Daily CMP to assess for electrolyte imbalances.  - Consider stool studies if persistent

## 2024-03-16 NOTE — EICU
Nurses Note -- 4 Eyes      3/16/2024   2:15 AM      Skin assessed during: Admit      [] No Altered Skin Integrity Present    [x]Prevention Measures Documented      [x] Yes- Altered Skin Integrity Present or Discovered   [x] LDA Added if Not in Epic (Describe Wound)   [x] New Altered Skin Integrity was Present on Admit and Documented in LDA   [x] Wound Image Taken    Wound Care Consulted? No    Attending Nurse:  Tae Castillo RN/Staff Member:   Jef

## 2024-03-16 NOTE — ASSESSMENT & PLAN NOTE
Key History and Diagnostic Findings  Suspected Etiology for DKA: UTI  Lab Results   Component Value Date    BHYDRXBUT 6.1 (H) 03/15/2024    BHYDRXBUT 5.3 (H) 03/23/2023    CO2 19 (L) 03/16/2024    CO2 19 (L) 03/16/2024    CO2 11 (L) 03/16/2024    CO2 <5 (LL) 03/15/2024    CO2 <5 (LL) 03/15/2024    ANIONGAP 12 03/16/2024    ANIONGAP 12 03/16/2024    ANIONGAP 22 (H) 03/16/2024    ANIONGAP Unable to calculate 03/15/2024    ANIONGAP Unable to calculate 03/15/2024    CREATININE 1.9 (H) 03/16/2024    CREATININE 2.0 (H) 03/16/2024        Plan    -Stop insulin gtt and transition patient to SQ insulin  - Levemir 7 units BID  - Aspart 3 units TIDAC if eating + LDSSI  - Recommend diabetic diet when tolerated  - POCT glucose check ACHS   - Daily BMP  - Will follow for home insulin needs - likely sending him back to [facility] on the same regimen he was doing as an outpt.

## 2024-03-16 NOTE — ASSESSMENT & PLAN NOTE
Admission sCR 2.1, previous Cr from 11d ago 1.2 (baseline appears to be .9-1.1).  Likely pre-renal from dehydration and volume losses from diarrhea. Patient appears dry on exam.    Plan:  - Patient received IVF as part of DKA protocol for volume resuscitation.  - If BRENDAN unimproved on repeat labs, consider obtaining Urine Na, urea and urine protein/creatinine ratio, and RP U/S to evaluate for other causes of BRENDAN.  - Strict I&Os  - Avoid nephrotoxic agents such as NSAIDs, gadolinium and IV radiocontrast.  - Renally dose meds to current GFR.

## 2024-03-16 NOTE — PROGRESS NOTES
Pharmacokinetic Assessment Follow Up: IV Vancomycin    Vancomycin serum concentration assessment(s):    The random level was drawn correctly and can be used to guide therapy at this time. The measurement is within the desired definitive target range of 10 to 20 mcg/mL.    Vancomycin Regimen Plan:    -BRENDAN steadily resolving, with SCr 2.4 mg/dL on admission and currently at 1.7 mg/dL.   -Will order Vancomycin 1500  mg IV once with next serum trough concentration measured at 1700 prior to next dose on 3/18     Drug levels (last 3 results):  Recent Labs   Lab Result Units 03/16/24  1615   Vancomycin, Random ug/mL 11.0       Pharmacy will continue to follow and monitor vancomycin.    Please contact pharmacy at extension [phone] for questions regarding this assessment.    Thank you for the consult,   Lai Alas       Patient brief summary:  Kamar Muñoz is a 78 y.o. male initiated on antimicrobial therapy with IV Vancomycin for treatment of sepsis    Drug Allergies:   Review of patient's allergies indicates:   Allergen Reactions    Iodine      Other reaction(s): swelling  Other reaction(s): Itching  Other reaction(s): Rash / IVP       Actual Body Weight:   75.8 kg    Renal Function:   Estimated Creatinine Clearance: 37.2 mL/min (A) (based on SCr of 1.7 mg/dL (H)).,     Dialysis Method (if applicable):  N/A

## 2024-03-16 NOTE — CONSULTS
"[facility] - Cardiac Medical ICU  Endocrinology  Consult Note    Consult Requested by: Jason Mckeon MD   Reason for admit: Hypovolemic shock    HISTORY OF PRESENT ILLNESS:  Kamar Muñoz is an 78-year-old man with ketosis-prone type 2 DM, CVA with residual left-sided deficits, bed-bound at baseline, HTN, nephrolithiasis, paroxysmal AFib on Eliquis, CAD s/p MI, who presents with hypotension, vomiting, and diarrhea from NH. Patient was somnolent at time of evaluation so history was obtained from chart review.    "Patient reports he has been having diarrhea about 5 times a day for the last several days, and one episode of emesis today, with some abdominal cramping. He also reports ongoing dizziness and lightheadedness. He is a somewhat poor historian but he denies any recent changes to his medications. On review of systems, he denies SOB, chest pain, blood in his stool or vomit, and reports no abdominal pain at present. Patient was recently hospitalized from 2/29 to 3/4 due to episode of hypoxemia and hypotension, thought to be due to symptomatic bradycardia. He was discharged with his beta blocker discontinued, and received IVF and antibiotics for UTI.     In the ED, patient tachycardic HR 100s, hypotensive 71/35 and received 2L of LR with BP only temporarily responsive, so pt was started on peripheral levophed. Initial labs notable for K 5.3, Bicarb 6, , BHB 6.1. Lactate 7.5. pH 7.121, CO2 24.9. Patient admitted to the MICU for DKA and shock requiring pressors."    Last admission Feb/2024 final endocrine recs from inpatient team for insulin regimen at [facility] were as follows:  Recommend resuming home regimen Levemir 6 units BID and Novolog 5 units plus SSI LDC (150/50) and Januvia 50mg QD.      Diabetes Complications include:     Hyperglycemia, Hypoglycemia , and Diabetic chronic kidney disease          Complicating diabetes co morbidities:   History of MI, History of CVA, and CKD    Hemoglobin A1C "   Date Value Ref Range Status   02/29/2024 8.7 (H) 4.0 - 5.6 % Final     Comment:     ADA Screening Guidelines:  5.7-6.4%  Consistent with prediabetes  >or=6.5%  Consistent with diabetes    High levels of fetal hemoglobin interfere with the HbA1C  assay. Heterozygous hemoglobin variants (HbS, HgC, etc)do  not significantly interfere with this assay.   However, presence of multiple variants may affect accuracy.     02/28/2023 8.9 (H) 4.0 - 5.6 % Final     Comment:     ADA Screening Guidelines:  5.7-6.4%  Consistent with prediabetes  >or=6.5%  Consistent with diabetes    High levels of fetal hemoglobin interfere with the HbA1C  assay. Heterozygous hemoglobin variants (HbS, HgC, etc)do  not significantly interfere with this assay.   However, presence of multiple variants may affect accuracy.     10/05/2022 10.6 (H) 4.0 - 5.6 % Final     Comment:     ADA Screening Guidelines:  5.7-6.4%  Consistent with prediabetes  >or=6.5%  Consistent with diabetes    High levels of fetal hemoglobin interfere with the HbA1C  assay. Heterozygous hemoglobin variants (HbS, HgC, etc)do  not significantly interfere with this assay.   However, presence of multiple variants may affect accuracy.           Medications and/or Treatments Impacting Glycemic Control:  Immunotherapy:    Immunosuppressants       None          Steroids:   Hormones (From admission, onward)      None          Pressors:    Autonomic Drugs (From admission, onward)      None            Medications Prior to Admission   Medication Sig Dispense Refill Last Dose    acetaminophen (TYLENOL) 500 MG tablet Take 1 tablet (500 mg total) by mouth every 8 (eight) hours.  0     alendronate (FOSAMAX) 70 MG tablet Take 70 mg by mouth every 7 days.       amiodarone (PACERONE) 200 MG Tab Take 1 tablet (200 mg total) by mouth once daily. 90 tablet 3     ascorbic acid, vitamin C, (VITAMIN C) 500 MG tablet Take 500 mg by mouth 2 (two) times daily.       atorvastatin (LIPITOR) 40 MG tablet  Take 1 tablet (40 mg total) by mouth once daily. 90 tablet 3     ELIQUIS 5 mg Tab Take 5 mg by mouth 2 (two) times daily.       glucose 4 GM chewable tablet Take 6 tablets (24 g total) by mouth as needed for Low blood sugar (< 50).  12     insulin detemir U-100, Levemir, 100 unit/mL (3 mL) SubQ InPn pen Inject 14 Units into the skin once daily. 4.2 mL 0     insulin lispro 100 unit/mL injection Inject 4 Units into the skin 3 (three) times daily before meals.       lacosamide (VIMPAT) 100 mg Tab Take 100 mg by mouth every 12 (twelve) hours.       LANTUS SOLOSTAR U-100 INSULIN glargine 100 units/mL SubQ pen Inject into the skin.       losartan (COZAAR) 25 MG tablet Take 25 mg by mouth once daily.       metFORMIN (GLUCOPHAGE) 500 MG tablet Take 500 mg by mouth 2 (two) times daily.       nitroGLYCERIN (NITROSTAT) 0.4 MG SL tablet Place 1 tablet (0.4 mg total) under the tongue every 5 (five) minutes as needed for Chest pain. 25 tablet 11     ondansetron (ZOFRAN) 4 MG tablet Take 4 mg by mouth every 6 (six) hours as needed for Nausea.       pantoprazole (PROTONIX) 40 MG tablet Take 1 tablet (40 mg total) by mouth once daily. 30 tablet 11     polyethylene glycol (GLYCOLAX) 17 gram/dose powder Use cap to measure out (17 g) mix with a liquid and take by mouth once daily. 510 g 2     SITagliptin phosphate (JANUVIA) 50 MG Tab Take 1 tablet (50 mg total) by mouth once daily. 30 tablet 2     sucralfate (CARAFATE) 1 gram tablet Take 1 g by mouth 3 (three) times daily before meals.       tamsulosin (FLOMAX) 0.4 mg Cap Take 1 capsule by mouth once daily.       vitamin D (VITAMIN D3) 1000 units Tab Take 1,000 Units by mouth once daily.          Current Facility-Administered Medications   Medication Dose Route Frequency Provider Last Rate Last Admin    0.9%  NaCl infusion   Intravenous Continuous Kishan Diaz  mL/hr at 03/16/24 0825 New Bag at 03/16/24 0825    acetaminophen tablet 650 mg  650 mg Oral Q4H PRN Kishan Diaz MD         amiodarone tablet 200 mg  200 mg Oral Daily Kishan Diaz MD   200 mg at 03/16/24 0914    apixaban tablet 2.5 mg  2.5 mg Oral BID Kishan Diaz MD   2.5 mg at 03/16/24 0914    aspirin chewable tablet 81 mg  81 mg Oral Daily Kishan Diaz MD   81 mg at 03/16/24 0914    atorvastatin tablet 40 mg  40 mg Oral Daily Kishan Diaz MD   40 mg at 03/16/24 0914    dextrose 10 % infusion   Intravenous PRN Elizabeth Knight MD        dextrose 10 % infusion   Intravenous PRN Elizabeth Knight MD        dextrose 5 % and 0.45 % NaCl infusion  125 mL/hr Intravenous Continuous PRN Kishan Diaz  mL/hr at 03/16/24 1012 125 mL/hr at 03/16/24 1012    dextrose 50% injection 12.5 g  12.5 g Intravenous PRN Saida Alex MD        dextrose 50% injection 25 g  25 g Intravenous PRN Saida Alex MD        glucagon (human recombinant) injection 1 mg  1 mg Intramuscular PRN Saida Alex MD        glucagon (human recombinant) injection 1 mg  1 mg Intramuscular LATANYAN Saida Alex MD        glucose chewable tablet 16 g  16 g Oral PRN Saida Alex MD        glucose chewable tablet 24 g  24 g Oral PRN Saida Alex MD        insulin aspart U-100 pen 0-5 Units  0-5 Units Subcutaneous Q6H Saida Alex MD        insulin regular in 0.9 % NaCl 100 unit/100 mL (1 unit/mL) infusion  0.8 Units/hr Intravenous Continuous Saida Alex MD        lacosamide tablet 100 mg  100 mg Oral Q12H Kishan Diaz MD   100 mg at 03/16/24 0914    ondansetron injection 4 mg  4 mg Intravenous Q6H PRN Kishan Diaz MD   4 mg at 03/16/24 0304    piperacillin-tazobactam (ZOSYN) 4.5 g in dextrose 5 % in water (D5W) 100 mL IVPB (MB+)  4.5 g Intravenous Q8H Kishan Diaz MD 25 mL/hr at 03/16/24 1102 4.5 g at 03/16/24 1102    potassium chloride 10 mEq in 100 mL IVPB  10 mEq Intravenous Q1H Sae Galdamez  mL/hr at 03/16/24 1122 10 mEq at 03/16/24 1122    promethazine 6.25 mg/5 mL  syrup 12.5 mg  12.5 mg Oral Q6H PRN Kishan Diaz MD   12.5 mg at 03/16/24 0452    sodium chloride 0.9% flush 10 mL  10 mL Intravenous PRN Kishan Diaz MD        vancomycin - pharmacy to dose   Intravenous pharmacy to manage frequency Kishan Diaz MD           No new subjective & objective note has been filed under this hospital service since the last note was generated.  .     ASSESSMENT and PLAN:    Endocrine  Type 2 diabetes mellitus with hyperglycemia, with long-term current use of insulin  Key History and Diagnostic Findings  Suspected Etiology: UTI  Lab Results   Component Value Date    BHYDRXBUT 6.1 (H) 03/15/2024    BHYDRXBUT 5.3 (H) 03/23/2023    CO2 19 (L) 03/16/2024    CO2 19 (L) 03/16/2024    CO2 11 (L) 03/16/2024    CO2 <5 (LL) 03/15/2024    CO2 <5 (LL) 03/15/2024    ANIONGAP 12 03/16/2024    ANIONGAP 12 03/16/2024    ANIONGAP 22 (H) 03/16/2024    ANIONGAP Unable to calculate 03/15/2024    ANIONGAP Unable to calculate 03/15/2024    CREATININE 1.9 (H) 03/16/2024    CREATININE 2.0 (H) 03/16/2024        Plan    -Recommend discontinuing intensive insulin drip and glucose containing fluids  -Start patient on transitional insulin drip with the following parameters:  Starting dose 0.8 units an hour  Blood sugar between 120 and 140, decrease rate to 0.6 units an hour  Blood sugar between 101 20, decrease rate to 0.5 hour urine since our  If blood sugar is below 100, stopped drip and recheck in 30 minutes.  If patient's blood sugar is still low, follow hypoglycemia protocol until patient's blood sugar is above 100.  At that time, resume insulin drip at 0.4 units an hour and follow routine monitoring schedule.    - Aspart 3 units TIDAC if eating + LDSSI  - Recommend diabetic diet when tolerated  - POCT glucose check ACHS/2AM  - Daily BMP  - Will follow for home insulin needs      DISCHARGE NEEDS: will assess daily    Saida Santamaria MD  Endocrinology  [facility] - Cardiac Medical ICU

## 2024-03-16 NOTE — SUBJECTIVE & OBJECTIVE
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    Hyperlipidemia     Hyperosmolar hyperglycemic state (HHS) 01/29/2022    Hypertension     Hypoglycemia unawareness associated with type 2 diabetes mellitus 04/08/2022    Kidney stone     Migraine headache     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 08/02/2012    Paroxysmal atrial fibrillation     Pressure sore     STEMI involving right coronary artery 01/09/2022    Type II or unspecified type diabetes mellitus with neurological manifestations, uncontrolled(250.62) 03/08/2013    Urinary tract infection     UTI (urinary tract infection) 02/28/2023       Past Surgical History:   Procedure Laterality Date    BACK SURGERY      CATARACT EXTRACTION W/  INTRAOCULAR LENS IMPLANT Right     Per Dr Romero note 11/2018    COLONOSCOPY N/A 01/28/2019    Procedure: COLONOSCOPY Suprep;  Surgeon: Anh Johnson MD;  Location: [facility];  Service: Endoscopy;  Laterality: N/A;    ESOPHAGOGASTRODUODENOSCOPY N/A 09/15/2022    Procedure: EGD (ESOPHAGOGASTRODUODENOSCOPY);  Surgeon: Dashawn Evans MD;  Location: [facility];  Service: Endoscopy;  Laterality: N/A;    EYE SURGERY      HERNIA REPAIR      INJECTION, SACROILIAC JOINT Left 09/28/2023    Procedure: INJECTION,SACROILIAC JOINT, LEFT SI;  Surgeon: Lucas Ramirez MD;  [facility];  Service: Pain Management;  Laterality: Left;    KYPHOPLASTY, SPINE, LUMBAR N/A 02/01/2023    Procedure: KYPHOPLASTY, SPINE, LUMBAR;  Surgeon: Kimberly Spicer MD;  Location: [facility] OR;  Service: Neurosurgery;  Laterality: N/A;  Ane: GenBlood: Type &  HoldPos: ProneRad: C-arm x 2 spec Equip: Dep[employer] - Tray Cortez    LEFT HEART CATHETERIZATION Left 2022    Procedure: CATHETERIZATION, HEART, LEFT;  Surgeon: Will Hurst III, MD;  Location: [facility] CATH LAB/EP;  Service: Cardiology;  Laterality: Left;    renal stones      SHOULDER OPEN ROTATOR CUFF REPAIR         Review of patient's allergies indicates:   Allergen Reactions    Iodine      Other reaction(s): swelling  Other reaction(s): Itching  Other reaction(s): Rash / IVP       Family History       Problem Relation (Age of Onset)    Diabetes Father          Tobacco Use    Smoking status: Former     Current packs/day: 0.00     Average packs/day: 1.5 packs/day for 25.0 years (37.5 ttl pk-yrs)     Types: Cigarettes     Start date: 1958     Quit date: 1983     Years since quittin.2    Smokeless tobacco: Never   Substance and Sexual Activity    Alcohol use: No    Drug use: No    Sexual activity: Yes     Partners: Female      Review of Systems   Constitutional:  Negative for chills and fever.   Eyes:  Negative for visual disturbance.   Respiratory:  Negative for cough and shortness of breath.    Cardiovascular:  Negative for chest pain.   Gastrointestinal:  Positive for diarrhea and vomiting. Negative for nausea.   Genitourinary:  Positive for difficulty urinating. Negative for dysuria.   Musculoskeletal:  Positive for back pain.   Skin:  Positive for wound.   Neurological:  Positive for dizziness and light-headedness. Negative for headaches.   Psychiatric/Behavioral:  Positive for decreased concentration.      Objective:     Vital Signs (Most Recent):  Temp: 98.2 °F (36.8 °C) (03/15/24 1836)  Pulse: 106 (03/15/24 2051)  Resp: 19 (03/15/24 2051)  BP: (!) 81/45 (03/15/24 2051)  SpO2: 99 % (03/15/24 2051) Vital Signs (24h Range):  Temp:  [98.2 °F (36.8 °C)] 98.2 °F (36.8 °C)  Pulse:  [] 106  Resp:  [17-23] 19  SpO2:  [97 %-100 %] 99 %  BP: (71-94)/(35-50) 81/45   Weight: 81.2 kg (179  lb)  Body mass index is 22.98 kg/m².    No intake or output data in the 24 hours ending 03/15/24 2104       Physical Exam  Vitals reviewed.   Constitutional:       General: He is not in acute distress.     Appearance: Normal appearance. He is ill-appearing.   HENT:      Head: Normocephalic and atraumatic.      Nose: No congestion or rhinorrhea.      Mouth/Throat:      Mouth: Mucous membranes are dry.      Pharynx: Oropharynx is clear.   Cardiovascular:      Rate and Rhythm: Regular rhythm. Tachycardia present.      Pulses: Normal pulses.      Heart sounds: Normal heart sounds.   Pulmonary:      Effort: Pulmonary effort is normal. No respiratory distress.      Breath sounds: Normal breath sounds.   Abdominal:      General: Bowel sounds are normal.      Palpations: Abdomen is soft.      Tenderness: There is no abdominal tenderness.   Genitourinary:     Comments: Mccord with cloudy yellow output  Musculoskeletal:         General: No swelling or tenderness.      Cervical back: Full passive range of motion without pain and normal range of motion.   Skin:     General: Skin is warm and dry.      Comments: Minor sacral wound, no skin breakdown   Neurological:      General: No focal deficit present.      Mental Status: He is alert and oriented to person, place, and time. Mental status is at baseline.   Psychiatric:         Mood and Affect: Mood normal.         Behavior: Behavior normal.            Vents:     Lines/Drains/Airways       Peripheral Intravenous Line  Duration                  Peripheral IV - Single Lumen 03/15/24 1905 18 G Anterior;Right Forearm <1 day         Peripheral IV - Single Lumen 03/15/24 1905 20 G Anterior;Left Upper Arm <1 day         Peripheral IV - Single Lumen 03/15/24 2028 18 G Anterior;Left Forearm <1 day                  Significant Labs:    CBC/Anemia Profile:  Recent Labs   Lab 03/15/24  1907   WBC 8.87   HGB 10.7*   HCT 36.5*      MCV 90   RDW 17.0*        Chemistries:  Recent Labs    Lab 03/15/24  1907   *   K 5.3*   CL 99   CO2 6*   BUN 36*   CREATININE 2.1*   CALCIUM 8.6*   ALBUMIN 2.6*   PROT 6.4   BILITOT 0.4   ALKPHOS 131   ALT 13   AST 18   MG 1.8       All pertinent labs within the past 24 hours have been reviewed.    Significant Imaging: I have reviewed all pertinent imaging results/findings within the past 24 hours.

## 2024-03-16 NOTE — H&P
[facility] - Emergency Dept  Critical Care Medicine  History & Physical    Patient Name: Kamar Muñoz  MRN: [id number]  Admission Date: 3/15/2024  Hospital Length of Stay: 1 days  Code Status: DNR  Attending Physician: Madelin Ocasio MD   Primary Care Provider: Basim Guerrero MD   Principal Problem: Hypovolemic shock    Subjective:     HPI:  Kamar Muñoz is an 78-year-old man with IDDM, CVA with residual left-sided deficits, bed-bound at baseline, HTN, nephrolithiasis, paroxysmal AFib on Eliquis, CAD s/p MI, who presents with hypotension, vomiting, and diarrhea from NH. Patient reports he has been having diarrhea about 5 times a day for the last several days, and one episode of emesis today, with some abdominal cramping. He also reports ongoing dizziness and lightheadedness. He is a somewhat poor historian but he denies any recent changes to his medications. On review of systems, he denies SOB, chest pain, blood in his stool or vomit, and reports no abdominal pain at present. Patient was recently hospitalized from 2/29 to 3/4 due to episode of hypoxemia and hypotension, thought to be due to symptomatic bradycardia. He was discharged with his beta blocker discontinued, and received IVF and antibiotics for UTI.    In the ED, patient tachycardic HR 100s, hypotensive 71/35 and received 2L of LR with BP only temporarily responsive, so pt was started on peripheral levophed. Initial labs notable for K 5.3, Bicarb 6, , BHB 6.1. Lactate 7.5. pH 7.121, CO2 24.9. Patient admitted to the MICU for DKA and shock requiring pressors.    Hospital/ICU Course:  No notes on file     Past Medical History:   Diagnosis Date    Anticoagulant long-term use     Arthritis     At high risk for falls     hx stroke-lt sided weakness    Bacterial pneumonia 04/22/2022    Colon polyp     Coronary artery disease     COVID-19 virus infection 02/18/2022    Depression     Diabetes mellitus type II     Diabetic ketoacidosis  without coma associated with type 2 diabetes mellitus     Embolic stroke involving left cerebellar artery 01/13/2022    Hyperlipidemia     Hyperosmolar hyperglycemic state (HHS) 01/29/2022    Hypertension     Hypoglycemia unawareness associated with type 2 diabetes mellitus 04/08/2022    Kidney stone     Migraine headache     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 08/02/2012    Paroxysmal atrial fibrillation     Pressure sore     STEMI involving right coronary artery 01/09/2022    Type II or unspecified type diabetes mellitus with neurological manifestations, uncontrolled(250.62) 03/08/2013    Urinary tract infection     UTI (urinary tract infection) 02/28/2023       Past Surgical History:   Procedure Laterality Date    BACK SURGERY      CATARACT EXTRACTION W/  INTRAOCULAR LENS IMPLANT Right     Per Dr Romero note 11/2018    COLONOSCOPY N/A 01/28/2019    Procedure: COLONOSCOPY Suprep;  Surgeon: Anh Johnson MD;  Location: [facility];  Service: Endoscopy;  Laterality: N/A;    ESOPHAGOGASTRODUODENOSCOPY N/A 09/15/2022    Procedure: EGD (ESOPHAGOGASTRODUODENOSCOPY);  Surgeon: Dashawn Evans MD;  Location: [facility];  Service: Endoscopy;  Laterality: N/A;    EYE SURGERY      HERNIA REPAIR      INJECTION, SACROILIAC JOINT Left 09/28/2023    Procedure: INJECTION,SACROILIAC JOINT, LEFT SI;  Surgeon: Lucas Ramirez MD;  Location: [facility] PAIN MGT;  Service: Pain Management;  Laterality: Left;    KYPHOPLASTY, SPINE, LUMBAR N/A 02/01/2023    Procedure: KYPHOPLASTY, SPINE, LUMBAR;  Surgeon: Kimberly Spicer MD;  Location: [facility] OR;  Service: Neurosurgery;  Laterality: N/A;  Ane: GenBlood: Type & HoldPos: ProneRad: C-arm x 2 spec Equip: Depuy Synthes - Tray Cortez    LEFT HEART CATHETERIZATION Left 01/09/2022    Procedure: CATHETERIZATION, HEART, LEFT;  Surgeon: Will Hurst III, MD;  Location: [facility] CATH LAB/EP;  Service: Cardiology;  Laterality: Left;    renal stones      SHOULDER OPEN ROTATOR CUFF REPAIR          Review of patient's allergies indicates:   Allergen Reactions    Iodine      Other reaction(s): swelling  Other reaction(s): Itching  Other reaction(s): Rash / IVP       Family History       Problem Relation (Age of Onset)    Diabetes Father          Tobacco Use    Smoking status: Former     Current packs/day: 0.00     Average packs/day: 1.5 packs/day for 25.0 years (37.5 ttl pk-yrs)     Types: Cigarettes     Start date: 1958     Quit date: 1983     Years since quittin.2    Smokeless tobacco: Never   Substance and Sexual Activity    Alcohol use: No    Drug use: No    Sexual activity: Yes     Partners: Female      Review of Systems   Constitutional:  Negative for chills and fever.   Eyes:  Negative for visual disturbance.   Respiratory:  Negative for cough and shortness of breath.    Cardiovascular:  Negative for chest pain.   Gastrointestinal:  Positive for diarrhea and vomiting. Negative for nausea.   Genitourinary:  Positive for difficulty urinating. Negative for dysuria.   Musculoskeletal:  Positive for back pain.   Skin:  Positive for wound.   Neurological:  Positive for dizziness and light-headedness. Negative for headaches.   Psychiatric/Behavioral:  Positive for decreased concentration.      Objective:     Vital Signs (Most Recent):  Temp: 98.2 °F (36.8 °C) (03/15/24 1836)  Pulse: 106 (03/15/24 2051)  Resp: 19 (03/15/24 2051)  BP: (!) 81/45 (03/15/24 2051)  SpO2: 99 % (03/15/24 2051) Vital Signs (24h Range):  Temp:  [98.2 °F (36.8 °C)] 98.2 °F (36.8 °C)  Pulse:  [] 106  Resp:  [17-23] 19  SpO2:  [97 %-100 %] 99 %  BP: (71-94)/(35-50) 81/45   Weight: 81.2 kg (179 lb)  Body mass index is 22.98 kg/m².    No intake or output data in the 24 hours ending 03/15/24 2104       Physical Exam  Vitals reviewed.   Constitutional:       General: He is not in acute distress.     Appearance: Normal appearance. He is ill-appearing.   HENT:      Head: Normocephalic and atraumatic.      Nose: No  congestion or rhinorrhea.      Mouth/Throat:      Mouth: Mucous membranes are dry.      Pharynx: Oropharynx is clear.   Cardiovascular:      Rate and Rhythm: Regular rhythm. Tachycardia present.      Pulses: Normal pulses.      Heart sounds: Normal heart sounds.   Pulmonary:      Effort: Pulmonary effort is normal. No respiratory distress.      Breath sounds: Normal breath sounds.   Abdominal:      General: Bowel sounds are normal.      Palpations: Abdomen is soft.      Tenderness: There is no abdominal tenderness.   Genitourinary:     Comments: Mccord with cloudy yellow output  Musculoskeletal:         General: No swelling or tenderness.      Cervical back: Full passive range of motion without pain and normal range of motion.   Skin:     General: Skin is warm and dry.      Comments: Minor sacral wound, no skin breakdown   Neurological:      General: No focal deficit present.      Mental Status: He is alert and oriented to person, place, and time. Mental status is at baseline.   Psychiatric:         Mood and Affect: Mood normal.         Behavior: Behavior normal.            Vents:     Lines/Drains/Airways       Peripheral Intravenous Line  Duration                  Peripheral IV - Single Lumen 03/15/24 1905 18 G Anterior;Right Forearm <1 day         Peripheral IV - Single Lumen 03/15/24 1905 20 G Anterior;Left Upper Arm <1 day         Peripheral IV - Single Lumen 03/15/24 2028 18 G Anterior;Left Forearm <1 day                  Significant Labs:    CBC/Anemia Profile:  Recent Labs   Lab 03/15/24  1907   WBC 8.87   HGB 10.7*   HCT 36.5*      MCV 90   RDW 17.0*        Chemistries:  Recent Labs   Lab 03/15/24  1907   *   K 5.3*   CL 99   CO2 6*   BUN 36*   CREATININE 2.1*   CALCIUM 8.6*   ALBUMIN 2.6*   PROT 6.4   BILITOT 0.4   ALKPHOS 131   ALT 13   AST 18   MG 1.8       All pertinent labs within the past 24 hours have been reviewed.    Significant Imaging: I have reviewed all pertinent imaging  "results/findings within the past 24 hours.  Assessment/Plan:     Neuro  History of partial seizures  Documented history of seizures.    - Continue home Vimpat 100mg BID    History of embolic stroke  Debility    CVA with R frontal and L cerebellar infarcts on 1/12/2022  with residual LSW and physical debility as a result.    Plan:  - Continue ASA 81mg and Atorvastatin 80mg qd.  - Monitor for and prevent skin breakdown and pressure ulcers, wound care consult as needed  - Early mobility, OOB in chair at least 3 hours per day, repositioning/weight shifting every 20-30 minutes when sitting, turn patient every 2 hours, proper mattress/overlay and chair cushioning, pressure relief/heel protector boots  - DVT prophylaxis covered with Eliquis 2.5 BID (79 yo and Cr 2.1)    Cardiac/Vascular  * Hypovolemic shock  See "DKA" for specifics for management. Patient presented with hypotension and MAP < 60 in the ED, despite 2L IVF. Started on peripheral levophed. Lactic elevated at 7 and pt acidemic. Likely due to dehydration in DKA and continued GI losses from diarrhea; though there may be an underlying infectious etiology as well. On exam, patient with cloudy, purulent appearing urine and had been recently hospitalized for suspected UTI though Ucx was negative.     - Trend lactate to clearance  - IVF as needed for resuscitation  - Wean pressors as able  - Continuing Vanc and zosyn at this time, but would de-escalate if cultures remain negative.  - Follow-up cultures; tailor antibiotics according to results    Dyslipidemia associated with type 2 diabetes mellitus  Continue home Atorvastatin 40mg qd.    Paroxysmal atrial fibrillation  On eliquis 5 BID, but given current reduced renal function and his age, meets criteria for reduced dose.    - Continue Eliquis 2.5 BID    Coronary artery disease involving native coronary artery of native heart with unstable angina pectoris  History of STEMI in 2021 with RCA LAUREN; LAD 70% medically " managed.     - Continue ASA 81, and high dose statin 40mg daily.    Essential hypertension  Patient hypotensive on admission, requiring pressor support. Holding any anti-hypertensive medications until clinically indicated    Renal/  BRENDAN (acute kidney injury)  Admission sCR 2.1, previous Cr from 11d ago 1.2 (baseline appears to be .9-1.1).  Likely pre-renal from dehydration and volume losses from diarrhea. Patient appears dry on exam.    Plan:  - Patient received IVF as part of DKA protocol for volume resuscitation.  - If BRENDAN unimproved on repeat labs, consider obtaining Urine Na, urea and urine protein/creatinine ratio, and RP U/S to evaluate for other causes of BRENDAN.  - Strict I&Os  - Avoid nephrotoxic agents such as NSAIDs, gadolinium and IV radiocontrast.  - Renally dose meds to current GFR.    Endocrine  DKA (diabetic ketoacidosis)  78M with hx of T2DM who presents with hypotension, nausea/vomiting, and diarrhea. Pt denies any changes to his insulin and reports that he receives it regularly at his NH. However, he does not know his doses. Outside documentation suggests Levemir 30U and Aspart 4U with meals, though on our records, his home dose of levemir is 14U. Will go by weight based dosing  Initial labs with acidosis pH of 7.1, beta hydroxybutyrate of 6.1, lactate 7.5, and blood glucose of >700 consistent with DKA. Pt received 2L IVF initially in the ED with minimal improvement in BP so was started on peripheral levo. Cause of DKA may be an ongoing UTI; pt recently hospitalized for UTI and was discharged with PO antibiotics, however pt with cloudy and purulent appearing urine.    Recommendations:  - Continuous NS at 125 mL/hr. Once BGL <200, change IVF to D5 1/2 NS at 50 mL/hr  - Monitor for volume overload and pulmonary edema  - Insulin gtt per protocol, gtt @ 0.1U/kg/hr  - q1h glucose accuchecks while on insulin gtt  - Monitor electrolytes with BMP q4h while on insulin gtt and place on telemetry, replete K  as needed if under 5.3  - Transition insulin gtt to subQ insulin when BGL is < 200mg/dL, and two of the following three are met: AG < 12, bicarb ? 18, and BGL less than 200 on two sequential reads while on steady insulin rate  - Diet NPO or clear liquids if pt tolerating and no n/v; advance as tolerated    Type 2 diabetes mellitus with hyperglycemia, with long-term current use of insulin  See DKA    GI  Diarrhea  Patient presented with 5 days of diarrhea from nursing home with DKA, hypotension requiring pressor support. Diarrhea apparently persistent issue as previous hospitalization also had report of diarrhea. Likely contributing to dehydration and hypovolemic state.    - Daily CMP to assess for electrolyte imbalances.  - Consider stool studies if persistent        Critical Care Daily Checklist:    A: Awake: RASS Goal/Actual Goal:    Actual:     B: Spontaneous Breathing Trial Performed?     C: SAT & SBT Coordinated?  N/A                      D: Delirium: CAM-ICU     E: Early Mobility Performed? No   F: Feeding Goal:    Status:     Current Diet Order   Procedures    Diet NPO      AS: Analgesia/Sedation None   T: Thromboembolic Prophylaxis Eliquis 2.5 BID   H: HOB > 300 Yes   U: Stress Ulcer Prophylaxis (if needed) N/a   G: Glucose Control 140-180, insulin gtt   B: Bowel Function     I: Indwelling Catheter (Lines & Lopez) Necessity 3 PIV, lopez   D: De-escalation of Antimicrobials/Pharmacotherapies On vanc and zosyn, de-escalate when possible    Plan for the day/ETD Admit to MICU    Code Status:  Family/Goals of Care: DNR         Critical secondary to Patient has a condition that poses threat to life and bodily function: Shock requiring pressors, DKA, BRENDAN    Critical care was time spent personally by me on the following activities: development of treatment plan with patient or surrogate and bedside caregivers, discussions with consultants, evaluation of patient's response to treatment, examination of patient,  ordering and performing treatments and interventions, ordering and review of laboratory studies, ordering and review of radiographic studies, pulse oximetry, re-evaluation of patient's condition. This critical care time did not overlap with that of any other provider or involve time for any procedures.     Kishan Diaz MD  Critical Care Medicine  [facility] - Emergency Dept

## 2024-03-16 NOTE — ASSESSMENT & PLAN NOTE
CVA with R frontal and L cerebellar infarcts on 1/12/2022  with residual LSW and physical debility as a result.    Plan:  - Monitor for and prevent skin breakdown and pressure ulcers, wound care consult as needed  - Early mobility, OOB in chair at least 3 hours per day, repositioning/weight shifting every 20-30 minutes when sitting, turn patient every 2 hours, proper mattress/overlay and chair cushioning, pressure relief/heel protector boots  - DVT prophylaxis covered with Eliquis 5 BID

## 2024-03-16 NOTE — CARE UPDATE
RAPID RESPONSE NURSE CHART REVIEW        Chart Reviewed: 03/15/2024, 10:59 PM    MRN: [id number]  Bed: ED 03/03    Dx: <principal problem not specified>    Kamar Muñoz has a past medical history of Anticoagulant long-term use, Arthritis, At high risk for falls, Bacterial pneumonia, Colon polyp, Coronary artery disease, COVID-19 virus infection, Depression, Diabetes mellitus type II, Diabetic ketoacidosis without coma associated with type 2 diabetes mellitus, Embolic stroke involving left cerebellar artery, Hyperlipidemia, Hyperosmolar hyperglycemic state (HHS), Hypertension, Hypoglycemia unawareness associated with type 2 diabetes mellitus, Kidney stone, Migraine headache, 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, Paroxysmal atrial fibrillation, Pressure sore, STEMI involving right coronary artery, Type II or unspecified type diabetes mellitus with neurological manifestations, uncontrolled(250.62), Urinary tract infection, and UTI (urinary tract infection).    Last VS: BP (!) 109/54   Pulse 104   Temp (!) 95.4 °F (35.2 °C)   Resp 20   Wt 81.2 kg (179 lb)   SpO2 98%   BMI 22.98 kg/m²     24H Vital Sign Range:  Temp:  [95.4 °F (35.2 °C)-98.2 °F (36.8 °C)]   Pulse:  []   Resp:  [17-26]   BP: ()/(35-57)   SpO2:  [97 %-100 %]     Level of Consciousness (AVPU): responds to voice    Recent Labs     03/15/24  1907   WBC 8.87   HGB 10.7*   HCT 36.5*          Recent Labs     03/15/24  1907   *   K 5.3*   CL 99   CO2 6*   BUN 36*   CREATININE 2.1*   *   PHOS 6.3*   MG 1.8        Recent Labs     03/15/24  1908 03/15/24  2211   PH 7.121* 7.000*   PCO2 24.9* 18.7*   PO2 84* 79*   HCO3 8.1* 4.6*   POCSATURATED 92 87   BE -21* -27*        OXYGEN:  Flow (L/min): 3          MEWS score:      Pt. Being admitted to [facility]. Awaiting MICU bed assignment.     Jazmín Shearer RN

## 2024-03-16 NOTE — ASSESSMENT & PLAN NOTE
Patient hypotensive on admission, requiring pressor support. Holding any anti-hypertensive medications until clinically indicated

## 2024-03-16 NOTE — ASSESSMENT & PLAN NOTE
Creatinine at 1.9 from baseline around 1.2.  Insulin could stay in his system for longer since it is renally cleared, we will be conservative with insulin adjustments.

## 2024-03-16 NOTE — CONSULTS
Patient seen and evaluated by critical care medicine. To be admitted to ICU for further management. Full H&P to follow.     Mariama Thomas, LORIE  Critical Care Medicine   03/15/2024

## 2024-03-16 NOTE — PROGRESS NOTES
"Pharmacokinetic Initial Assessment & Plan: IV Vancomycin    IV Vancomycin 1750 mg x once given in the ED on 03/15 @ 2202  BRENDAN. Subsequent doses based on random Vancomycin level  Obtain a Vancomycin random tomorrow on 03/16 @ 1600   Desired empiric serum trough concentration is 15 to 20 mcg/mL    Pharmacy will continue to follow and monitor vancomycin.    [id number] with any questions regarding this assessment.     Thank you for the consult,   Arpan Pitts        Patient brief summary:  Kamar Muñoz is a 78 y.o. male initiated on antimicrobial therapy with IV Vancomycin for treatment of suspected sepsis    Drug Allergies:   Review of patient's allergies indicates:   Allergen Reactions    Iodine      Other reaction(s): swelling  Other reaction(s): Itching  Other reaction(s): Rash / IVP       Actual Body Weight:   81.2 kg    Renal Function:   Estimated Creatinine Clearance: 32.2 mL/min (A) (based on SCr of 2.1 mg/dL (H)).,     CBC (last 72 hours):  Recent Labs   Lab Result Units 03/15/24  1907   WBC K/uL 8.87   Hemoglobin g/dL 10.7*   Hematocrit % 36.5*   Platelets K/uL 242   Gran % % 82.0*   Lymph % % 12.3*   Mono % % 4.5   Eosinophil % % 0.1   Basophil % % 0.6   Differential Method  Automated       Metabolic Panel (last 72 hours):  Recent Labs   Lab Result Units 03/15/24  1907 03/15/24  2208 03/15/24  2349 03/15/24  2352   Sodium mmol/L 135*  --   --  135*  135*   Potassium mmol/L 5.3*  --   --  4.2  4.2   Chloride mmol/L 99  --   --  100  100   CO2 mmol/L 6*  --   --   --    Glucose mg/dL 826* 857*  --   --    BUN mg/dL 36*  --   --   --    Creatinine mg/dL 2.1*  --   --   --    Albumin g/dL 2.6*  --   --   --    Total Bilirubin mg/dL 0.4  --   --   --    Alkaline Phosphatase U/L 131  --   --   --    AST U/L 18  --   --   --    ALT U/L 13  --   --   --    Magnesium mg/dL 1.8  --   --   --    Phosphorus mg/dL 6.3*  --  6.5*  --        Drug levels (last 3 results):  No results for input(s): "VANCOMYCINRA", " ""VANCORANDOM", "VANCOMYCINPE", "VANCOPEAK", "VANCOMYCINTR", "VANCOTROUGH" in the last 72 hours.    Microbiologic Results:  Microbiology Results (last 7 days)       Procedure Component Value Units Date/Time    Influenza A & B by Molecular [[id number]] Collected: 03/15/24 2000    Order Status: Completed Specimen: Nasopharyngeal Swab Updated: 03/15/24 2132     Influenza A, Molecular Negative     Influenza B, Molecular Negative     Flu A & B Source Nasal swab    Blood culture #1 **CANNOT BE ORDERED STAT** [[id number]] Collected: 03/15/24 1912    Order Status: Sent Specimen: Blood from Peripheral, Antecubital, Left Updated: 03/15/24 1920    Blood culture #2 **CANNOT BE ORDERED STAT** [[id number]] Collected: 03/15/24 1911    Order Status: Sent Specimen: Blood from Peripheral, Wrist, Right Updated: 03/15/24 1920            "

## 2024-03-16 NOTE — HPI
"Kamar Muñoz is an 78-year-old man with ketosis-prone type 2 DM, CVA with residual left-sided deficits, bed-bound at baseline, HTN, nephrolithiasis, paroxysmal AFib on Eliquis, CAD s/p MI, who presents with hypotension, vomiting, and diarrhea from NH. Patient was somnolent at time of evaluation so history was obtained from chart review.    "Patient reports he has been having diarrhea about 5 times a day for the last several days, and one episode of emesis today, with some abdominal cramping. He also reports ongoing dizziness and lightheadedness. He is a somewhat poor historian but he denies any recent changes to his medications. On review of systems, he denies SOB, chest pain, blood in his stool or vomit, and reports no abdominal pain at present. Patient was recently hospitalized from 2/29 to 3/4 due to episode of hypoxemia and hypotension, thought to be due to symptomatic bradycardia. He was discharged with his beta blocker discontinued, and received IVF and antibiotics for UTI.     In the ED, patient tachycardic HR 100s, hypotensive 71/35 and received 2L of LR with BP only temporarily responsive, so pt was started on peripheral levophed. Initial labs notable for K 5.3, Bicarb 6, , BHB 6.1. Lactate 7.5. pH 7.121, CO2 24.9. Patient admitted to the MICU for DKA and shock requiring pressors."    Last admission Feb/2024 final endocrine recs from inpatient team for insulin regimen at [place] were as follows:  Recommend resuming home regimen Levemir 6 units BID and Novolog 5 units plus SSI LDC (150/50) and Januvia 50mg QD.      Diabetes Complications include:     Hyperglycemia, Hypoglycemia , and Diabetic chronic kidney disease          Complicating diabetes co morbidities:   History of MI, History of CVA, and CKD    Hemoglobin A1C   Date Value Ref Range Status   02/29/2024 8.7 (H) 4.0 - 5.6 % Final     Comment:     ADA Screening Guidelines:  5.7-6.4%  Consistent with prediabetes  >or=6.5%  Consistent with " diabetes    High levels of fetal hemoglobin interfere with the HbA1C  assay. Heterozygous hemoglobin variants (HbS, HgC, etc)do  not significantly interfere with this assay.   However, presence of multiple variants may affect accuracy.     02/28/2023 8.9 (H) 4.0 - 5.6 % Final     Comment:     ADA Screening Guidelines:  5.7-6.4%  Consistent with prediabetes  >or=6.5%  Consistent with diabetes    High levels of fetal hemoglobin interfere with the HbA1C  assay. Heterozygous hemoglobin variants (HbS, HgC, etc)do  not significantly interfere with this assay.   However, presence of multiple variants may affect accuracy.     10/05/2022 10.6 (H) 4.0 - 5.6 % Final     Comment:     ADA Screening Guidelines:  5.7-6.4%  Consistent with prediabetes  >or=6.5%  Consistent with diabetes    High levels of fetal hemoglobin interfere with the HbA1C  assay. Heterozygous hemoglobin variants (HbS, HgC, etc)do  not significantly interfere with this assay.   However, presence of multiple variants may affect accuracy.

## 2024-03-16 NOTE — PLAN OF CARE
Problem: Adjustment to Illness (Delirium)  Goal: Optimal Coping  Outcome: Ongoing, Progressing     Problem: Altered Behavior (Delirium)  Goal: Improved Behavioral Control  Outcome: Ongoing, Progressing     Problem: Attention and Thought Clarity Impairment (Delirium)  Goal: Improved Attention and Thought Clarity  Outcome: Ongoing, Progressing     Problem: Sleep Disturbance (Delirium)  Goal: Improved Sleep  Outcome: Ongoing, Progressing     Problem: Adult Inpatient Plan of Care  Goal: Plan of Care Review  Outcome: Ongoing, Progressing  Goal: Patient-Specific Goal (Individualized)  Outcome: Ongoing, Progressing  Goal: Absence of Hospital-Acquired Illness or Injury  Outcome: Ongoing, Progressing  Goal: Optimal Comfort and Wellbeing  Outcome: Ongoing, Progressing  Goal: Readiness for Transition of Care  Outcome: Ongoing, Progressing     Problem: Diabetic Ketoacidosis  Goal: Fluid and Electrolyte Balance with Absence of Ketosis  Outcome: Ongoing, Progressing     Problem: Diabetes Comorbidity  Goal: Blood Glucose Level Within Targeted Range  Outcome: Ongoing, Progressing     Problem: Fluid and Electrolyte Imbalance (Acute Kidney Injury/Impairment)  Goal: Fluid and Electrolyte Balance  Outcome: Ongoing, Progressing     Problem: Oral Intake Inadequate (Acute Kidney Injury/Impairment)  Goal: Optimal Nutrition Intake  Outcome: Ongoing, Progressing     Problem: Renal Function Impairment (Acute Kidney Injury/Impairment)  Goal: Effective Renal Function  Outcome: Ongoing, Progressing     Problem: Impaired Wound Healing  Goal: Optimal Wound Healing  Outcome: Ongoing, Progressing     Problem: Fall Injury Risk  Goal: Absence of Fall and Fall-Related Injury  Outcome: Ongoing, Progressing

## 2024-03-16 NOTE — PLAN OF CARE
[facility] - Step[facility] Flex ([place])  Initial Discharge Assessment       Primary Care Provider: Basim Guerrero MD    Admission Diagnosis: Vomiting [R11.10]  AMS (altered mental status) [R41.82]    Admission Date: 3/15/2024  Expected Discharge Date:     Transition of Care Barriers: None    Payor: MEDICARE / Plan: MEDICARE PART A & B / Product Type: Government /     Extended Emergency Contact Information  Primary Emergency Contact: Basia Herrera  Mobile Phone: [phone]  Relation: Daughter  Secondary Emergency Contact: Juno Sampsonie  Mobile Phone: [phone]  Relation: Daughter  Preferred language: English   needed? No    Discharge Plan A: Return to nursing home ([facility] Phone: [phone])         [id number] - [place]  [address] [facility]  [address]  [place] [address]  Phone: [phone] Fax: [phone]    [facility]  [address] [facility] [address]  [place] [address]  Phone: [phone] Fax: [phone]    [id number]  [place] [address] AT [address] [address]  Phone: [phone] Fax: [phone]      Initial Assessment (most recent)       Adult Discharge Assessment - 03/16/24 1651          Discharge Assessment    Assessment Type Discharge Planning Assessment     Confirmed/corrected address, phone number and insurance Yes     Confirmed Demographics Correct on Facesheet     Source of Information family     Reason For Admission Hypovolemic shock     People in Home facility resident     Facility Arrived From: [facility] Phone: [phone]     Do you expect to return to your current living situation? Yes   [facility] Phone: [phone]    Do you have help at home or someone to help you manage your care at home? Yes     Who are your caregiver(s) and their phone  number(s)? [facility] Phone: [phone]     Prior to hospitilization cognitive status: Not Oriented to Place;Not Oriented to Time     Current cognitive status: Not Oriented to Place;Not Oriented to Time     Equipment Currently Used at Home wheelchair     Readmission within 30 days? Yes     Patient currently being followed by outpatient case management? No     Do you currently have service(s) that help you manage your care at home? No     Do you take prescription medications? Yes     Do you have prescription coverage? Yes     Coverage MEDICARE - MEDICARE PART A & B     Do you have any problems affording any of your prescribed medications? No     Is the patient taking medications as prescribed? yes     Who is going to help you get home at discharge? [facility] Phone: [phone]     How do you get to doctors appointments? health plan transportation;family or friend will provide     Are you on dialysis? No     Do you take coumadin? No     Discharge Plan A Return to nursing home   [facility] Phone: [phone]    DME Needed Upon Discharge  other (see comments)   TB D    Discharge Plan discussed with: Adult children     Transition of Care Barriers None        Physical Activity    On average, how many days per week do you engage in moderate to strenuous exercise (like a brisk walk)? 3 days     On average, how many minutes do you engage in exercise at this level? 20 min        Financial Resource Strain    How hard is it for you to pay for the very basics like food, housing, medical care, and heating? Not hard at all        Housing Stability    In the last 12 months, was there a time when you were not able to pay the mortgage or rent on time? No     In the last 12 months, was there a time when you did not have a steady place to sleep or slept in a shelter (including now)? No        Transportation Needs    In the past 12 months, has lack of transportation kept  you from medical appointments or from getting medications? No     In the past 12 months, has lack of transportation kept you from meetings, work, or from getting things needed for daily living? No        Food Insecurity    Within the past 12 months, you worried that your food would run out before you got the money to buy more. Never true     Within the past 12 months, the food you bought just didn't last and you didn't have money to get more. Never true        Stress    Do you feel stress - tense, restless, nervous, or anxious, or unable to sleep at night because your mind is troubled all the time - these days? Only a little        Social Connections    In a typical week, how many times do you talk on the phone with family, friends, or neighbors? Twice a week     How often do you get together with friends or relatives? Never     How often do you attend Gnosticist or Restoration services? 1 to 4 times per year     Do you belong to any clubs or organizations such as Gnosticist groups, unions, fraternal or athletic groups, or school groups? No     How often do you attend meetings of the clubs or organizations you belong to? Never     Are you , , , , never , or living with a partner?         Alcohol Use    Q1: How often do you have a drink containing alcohol? Never     Q2: How many drinks containing alcohol do you have on a typical day when you are drinking? Patient does not drink     Q3: How often do you have six or more drinks on one occasion? Never        OTHER    Name(s) of People in Home [facility] Phone: [phone]                     Readmission Assessment (most recent)       Readmission Assessment - 03/16/24 7911          Readmission    Why were you hospitalized in the last 30 days? yes 3/1/24-3/4/24     Why were you readmitted? New medical problem     When you left the hospital where did you go? Nursing Home   [facility] Phone: [phone]    Did patient/caregiver refused recommended DC plan? No     Did you try to manage your symptoms your self? No     Did you call anyone? Yes     Who did you call? PCP   [facility] Phone: [phone]    Did you try to see or did see a doctor or nurse before you came? Yes     Were you seen? Yes     Did you have  a follow-up appointment on discharge? Yes     Did you go? Yes     Was this a planned readmission? Yes                      Komal Cuello RN  Case Management  [facility]  [phone]

## 2024-03-16 NOTE — ASSESSMENT & PLAN NOTE
Key History and Diagnostic Findings  Suspected Etiology: UTI  Lab Results   Component Value Date    BHYDRXBUT 6.1 (H) 03/15/2024    BHYDRXBUT 5.3 (H) 03/23/2023    CO2 19 (L) 03/16/2024    CO2 19 (L) 03/16/2024    CO2 11 (L) 03/16/2024    CO2 <5 (LL) 03/15/2024    CO2 <5 (LL) 03/15/2024    ANIONGAP 12 03/16/2024    ANIONGAP 12 03/16/2024    ANIONGAP 22 (H) 03/16/2024    ANIONGAP Unable to calculate 03/15/2024    ANIONGAP Unable to calculate 03/15/2024    CREATININE 1.9 (H) 03/16/2024    CREATININE 2.0 (H) 03/16/2024        Plan    -Recommend discontinuing intensive insulin drip and glucose containing fluids  -Start patient on transitional insulin drip with the following parameters:  Starting dose 0.8 units an hour  Blood sugar between 120 and 140, decrease rate to 0.6 units an hour  Blood sugar between 101 20, decrease rate to 0.5 hour urine since our  If blood sugar is below 100, stopped drip and recheck in 30 minutes.  If patient's blood sugar is still low, follow hypoglycemia protocol until patient's blood sugar is above 100.  At that time, resume insulin drip at 0.4 units an hour and follow routine monitoring schedule.    - Recommend diabetic diet   - POCT glucose check ACHS  - Daily BMP  - Will follow for home insulin needs

## 2024-03-16 NOTE — SUBJECTIVE & OBJECTIVE
Interval HPI:   Overnight events:  He was started on insulin gtt with resolution of DKA this AM.    PMH, PSH, FH, SH updated and reviewed     ROS:  Unable to obtain due to: somnolence    Current Medications and/or Treatments Impacting Glycemic Control  Immunotherapy:    Immunosuppressants       None          Steroids:   Hormones (From admission, onward)      None          Pressors:    Autonomic Drugs (From admission, onward)      None          Hyperglycemia/Diabetes Medications:   Antihyperglycemics (From admission, onward)      Start     Stop Route Frequency Ordered    03/15/24 2100  insulin regular in 0.9 % NaCl 100 unit/100 mL (1 unit/mL) infusion        Question Answer Comment   Insulin Rate Adjustment (DO NOT MODIFY ANSWER) \\[email or web address]\[email or web address]\Images\Pharmacy\InsulinInfusions\INSULIN ADJUSTMENT DKA version [id number]    Initial dose (DO NOT CHANGE): 0.1 units/kg/hr        -- IV Continuous 03/15/24 2026             PHYSICAL EXAMINATION:  Vitals:    03/16/24 1100   BP: (!) 150/63   Pulse: 74   Resp: 18   Temp: 97.6 °F (36.4 °C)     Body mass index is 21.46 kg/m².     Physical Exam  Vitals reviewed.   Constitutional:       General: He is not in acute distress.     Appearance: Normal appearance. He is normal weight. He is not ill-appearing.   HENT:      Head: Normocephalic and atraumatic.      Mouth/Throat:      Mouth: Mucous membranes are dry.   Eyes:      Extraocular Movements: Extraocular movements intact.      Conjunctiva/sclera: Conjunctivae normal.   Cardiovascular:      Rate and Rhythm: Normal rate and regular rhythm.      Pulses: Normal pulses.   Pulmonary:      Effort: Pulmonary effort is normal. No respiratory distress.   Neurological:      Mental Status: He is oriented to person, place, and time.      Comments: Arousable to voice, oriented but drowsy

## 2024-03-16 NOTE — ASSESSMENT & PLAN NOTE
History of STEMI in 2021 with RCA LAUREN; LAD 70% medically managed.     - Continue ASA 81, and high dose statin 40mg daily.

## 2024-03-16 NOTE — ASSESSMENT & PLAN NOTE
On eliquis 5 BID, but given current reduced renal function and his age, meets criteria for reduced dose.    - Continue Eliquis 2.5 BID   Patient of Dr Cheryle Rummage seen in the [facility] office  Patient would like the results of his ultrasound  Please advise

## 2024-03-17 PROBLEM — B37.49 CANDIDURIA: Status: ACTIVE | Noted: 2024-03-17

## 2024-03-17 PROBLEM — R78.81 POSITIVE BLOOD CULTURE: Status: ACTIVE | Noted: 2024-03-17

## 2024-03-17 LAB
ACINETOBACTER CALCOACETICUS/BAUMANNII COMPLEX: NOT DETECTED
ANION GAP SERPL CALC-SCNC: 5 MMOL/L (ref 8–16)
ANION GAP SERPL CALC-SCNC: 7 MMOL/L (ref 8–16)
BACTERIA UR CULT: ABNORMAL
BACTEROIDES FRAGILIS: NOT DETECTED
BASOPHILS # BLD AUTO: 0.04 K/UL (ref 0–0.2)
BASOPHILS NFR BLD: 0.4 % (ref 0–1.9)
BUN SERPL-MCNC: 17 MG/DL (ref 8–23)
BUN SERPL-MCNC: 18 MG/DL (ref 8–23)
BUN SERPL-MCNC: 18 MG/DL (ref 8–23)
BUN SERPL-MCNC: 20 MG/DL (ref 8–23)
BUN SERPL-MCNC: 21 MG/DL (ref 8–23)
BUN SERPL-MCNC: 21 MG/DL (ref 8–23)
CALCIUM SERPL-MCNC: 7.9 MG/DL (ref 8.7–10.5)
CALCIUM SERPL-MCNC: 7.9 MG/DL (ref 8.7–10.5)
CALCIUM SERPL-MCNC: 8 MG/DL (ref 8.7–10.5)
CALCIUM SERPL-MCNC: 8.1 MG/DL (ref 8.7–10.5)
CALCIUM SERPL-MCNC: 8.2 MG/DL (ref 8.7–10.5)
CALCIUM SERPL-MCNC: 8.2 MG/DL (ref 8.7–10.5)
CANDIDA ALBICANS: NOT DETECTED
CANDIDA AURIS: NOT DETECTED
CANDIDA GLABRATA: NOT DETECTED
CANDIDA KRUSEI: NOT DETECTED
CANDIDA PARAPSILOSIS: NOT DETECTED
CANDIDA TROPICALIS: NOT DETECTED
CHLORIDE SERPL-SCNC: 105 MMOL/L (ref 95–110)
CHLORIDE SERPL-SCNC: 106 MMOL/L (ref 95–110)
CHLORIDE SERPL-SCNC: 107 MMOL/L (ref 95–110)
CHLORIDE SERPL-SCNC: 108 MMOL/L (ref 95–110)
CO2 SERPL-SCNC: 21 MMOL/L (ref 23–29)
CO2 SERPL-SCNC: 21 MMOL/L (ref 23–29)
CO2 SERPL-SCNC: 22 MMOL/L (ref 23–29)
CO2 SERPL-SCNC: 24 MMOL/L (ref 23–29)
CREAT SERPL-MCNC: 1.3 MG/DL (ref 0.5–1.4)
CREAT SERPL-MCNC: 1.3 MG/DL (ref 0.5–1.4)
CREAT SERPL-MCNC: 1.4 MG/DL (ref 0.5–1.4)
CREAT SERPL-MCNC: 1.5 MG/DL (ref 0.5–1.4)
CREAT SERPL-MCNC: 1.5 MG/DL (ref 0.5–1.4)
CREAT SERPL-MCNC: 1.6 MG/DL (ref 0.5–1.4)
CRYPTOCOCCUS NEOFORMANS/GATTII: NOT DETECTED
CTX-M GENE (ESBL PRODUCER): ABNORMAL
DIFFERENTIAL METHOD BLD: ABNORMAL
ENTEROBACTER CLOACAE COMPLEX: NOT DETECTED
ENTEROBACTERALES: NOT DETECTED
ENTEROCOCCUS FAECALIS: NOT DETECTED
ENTEROCOCCUS FAECIUM: NOT DETECTED
EOSINOPHIL # BLD AUTO: 0.1 K/UL (ref 0–0.5)
EOSINOPHIL NFR BLD: 0.6 % (ref 0–8)
ERYTHROCYTE [DISTWIDTH] IN BLOOD BY AUTOMATED COUNT: 17 % (ref 11.5–14.5)
ESCHERICHIA COLI: NOT DETECTED
EST. GFR  (NO RACE VARIABLE): 43.3 ML/MIN/1.73 M^2
EST. GFR  (NO RACE VARIABLE): 46.8 ML/MIN/1.73 M^2
EST. GFR  (NO RACE VARIABLE): 46.8 ML/MIN/1.73 M^2
EST. GFR  (NO RACE VARIABLE): 50.8 ML/MIN/1.73 M^2
EST. GFR  (NO RACE VARIABLE): 55.5 ML/MIN/1.73 M^2
EST. GFR  (NO RACE VARIABLE): 55.5 ML/MIN/1.73 M^2
GLUCOSE SERPL-MCNC: 123 MG/DL (ref 70–110)
GLUCOSE SERPL-MCNC: 152 MG/DL (ref 70–110)
GLUCOSE SERPL-MCNC: 210 MG/DL (ref 70–110)
GLUCOSE SERPL-MCNC: 215 MG/DL (ref 70–110)
GLUCOSE SERPL-MCNC: 242 MG/DL (ref 70–110)
GLUCOSE SERPL-MCNC: 277 MG/DL (ref 70–110)
HAEMOPHILUS INFLUENZAE: NOT DETECTED
HCT VFR BLD AUTO: 32 % (ref 40–54)
HGB BLD-MCNC: 9.9 G/DL (ref 14–18)
IMM GRANULOCYTES # BLD AUTO: 0.04 K/UL (ref 0–0.04)
IMM GRANULOCYTES NFR BLD AUTO: 0.4 % (ref 0–0.5)
IMP GENE (CARBAPENEM RESISTANT): ABNORMAL
KLEBSIELLA AEROGENES: NOT DETECTED
KLEBSIELLA OXYTOCA: NOT DETECTED
KLEBSIELLA PNEUMONIAE GROUP: NOT DETECTED
KPC RESISTANCE GENE (CARBAPENEM): ABNORMAL
LACTATE SERPL-SCNC: 1.5 MMOL/L (ref 0.5–2.2)
LISTERIA MONOCYTOGENES: NOT DETECTED
LYMPHOCYTES # BLD AUTO: 1.5 K/UL (ref 1–4.8)
LYMPHOCYTES NFR BLD: 13.8 % (ref 18–48)
MAGNESIUM SERPL-MCNC: 1.7 MG/DL (ref 1.6–2.6)
MCH RBC QN AUTO: 26 PG (ref 27–31)
MCHC RBC AUTO-ENTMCNC: 30.9 G/DL (ref 32–36)
MCR-1: ABNORMAL
MCV RBC AUTO: 84 FL (ref 82–98)
MEC A/C AND MREJ (MRSA): ABNORMAL
MEC A/C: DETECTED
MONOCYTES # BLD AUTO: 0.8 K/UL (ref 0.3–1)
MONOCYTES NFR BLD: 6.9 % (ref 4–15)
NDM GENE (CARBAPENEM RESISTANT): ABNORMAL
NEISSERIA MENINGITIDIS: NOT DETECTED
NEUTROPHILS # BLD AUTO: 8.5 K/UL (ref 1.8–7.7)
NEUTROPHILS NFR BLD: 77.9 % (ref 38–73)
NRBC BLD-RTO: 0 /100 WBC
OXA-48-LIKE (CARBAPENEM RESISTANT): ABNORMAL
PLATELET # BLD AUTO: 224 K/UL (ref 150–450)
PMV BLD AUTO: 10.6 FL (ref 9.2–12.9)
POCT GLUCOSE: 124 MG/DL (ref 70–110)
POCT GLUCOSE: 139 MG/DL (ref 70–110)
POCT GLUCOSE: 244 MG/DL (ref 70–110)
POCT GLUCOSE: 247 MG/DL (ref 70–110)
POCT GLUCOSE: 265 MG/DL (ref 70–110)
POCT GLUCOSE: 284 MG/DL (ref 70–110)
POTASSIUM SERPL-SCNC: 3.7 MMOL/L (ref 3.5–5.1)
POTASSIUM SERPL-SCNC: 3.8 MMOL/L (ref 3.5–5.1)
POTASSIUM SERPL-SCNC: 4.1 MMOL/L (ref 3.5–5.1)
POTASSIUM SERPL-SCNC: 4.2 MMOL/L (ref 3.5–5.1)
PROTEUS SPECIES: NOT DETECTED
PSEUDOMONAS AERUGINOSA: NOT DETECTED
RBC # BLD AUTO: 3.81 M/UL (ref 4.6–6.2)
SALMONELLA SP: NOT DETECTED
SERRATIA MARCESCENS: NOT DETECTED
SODIUM SERPL-SCNC: 133 MMOL/L (ref 136–145)
SODIUM SERPL-SCNC: 134 MMOL/L (ref 136–145)
SODIUM SERPL-SCNC: 134 MMOL/L (ref 136–145)
SODIUM SERPL-SCNC: 135 MMOL/L (ref 136–145)
SODIUM SERPL-SCNC: 136 MMOL/L (ref 136–145)
SODIUM SERPL-SCNC: 136 MMOL/L (ref 136–145)
STAPHYLOCOCCUS AUREUS: NOT DETECTED
STAPHYLOCOCCUS EPIDERMIDIS: DETECTED
STAPHYLOCOCCUS LUGDUNESIS: NOT DETECTED
STAPHYLOCOCCUS SPECIES: ABNORMAL
STENOTROPHOMONAS MALTOPHILIA: NOT DETECTED
STREPTOCOCCUS AGALACTIAE: NOT DETECTED
STREPTOCOCCUS PNEUMONIAE: NOT DETECTED
STREPTOCOCCUS PYOGENES: NOT DETECTED
STREPTOCOCCUS SPECIES: NOT DETECTED
VAN A/B (VRE GENE): ABNORMAL
VIM GENE (CARBAPENEM RESISTANT): ABNORMAL
WBC # BLD AUTO: 10.92 K/UL (ref 3.9–12.7)

## 2024-03-17 PROCEDURE — 27000207 HC ISOLATION

## 2024-03-17 PROCEDURE — 25000003 PHARM REV CODE 250: Performed by: STUDENT IN AN ORGANIZED HEALTH CARE EDUCATION/TRAINING PROGRAM

## 2024-03-17 PROCEDURE — 63600175 PHARM REV CODE 636 W HCPCS: Performed by: STUDENT IN AN ORGANIZED HEALTH CARE EDUCATION/TRAINING PROGRAM

## 2024-03-17 PROCEDURE — 83735 ASSAY OF MAGNESIUM: CPT

## 2024-03-17 PROCEDURE — 36415 COLL VENOUS BLD VENIPUNCTURE: CPT | Mod: XB

## 2024-03-17 PROCEDURE — 63600175 PHARM REV CODE 636 W HCPCS

## 2024-03-17 PROCEDURE — 36415 COLL VENOUS BLD VENIPUNCTURE: CPT | Mod: XB | Performed by: INTERNAL MEDICINE

## 2024-03-17 PROCEDURE — 83605 ASSAY OF LACTIC ACID: CPT | Performed by: STUDENT IN AN ORGANIZED HEALTH CARE EDUCATION/TRAINING PROGRAM

## 2024-03-17 PROCEDURE — 87449 NOS EACH ORGANISM AG IA: CPT | Performed by: STUDENT IN AN ORGANIZED HEALTH CARE EDUCATION/TRAINING PROGRAM

## 2024-03-17 PROCEDURE — 80048 BASIC METABOLIC PNL TOTAL CA: CPT

## 2024-03-17 PROCEDURE — 85025 COMPLETE CBC W/AUTO DIFF WBC: CPT

## 2024-03-17 PROCEDURE — 80048 BASIC METABOLIC PNL TOTAL CA: CPT | Mod: 91

## 2024-03-17 PROCEDURE — 87040 BLOOD CULTURE FOR BACTERIA: CPT | Performed by: INTERNAL MEDICINE

## 2024-03-17 PROCEDURE — 25000003 PHARM REV CODE 250

## 2024-03-17 PROCEDURE — 94761 N-INVAS EAR/PLS OXIMETRY MLT: CPT

## 2024-03-17 PROCEDURE — 99232 SBSQ HOSP IP/OBS MODERATE 35: CPT | Mod: GC,,, | Performed by: INTERNAL MEDICINE

## 2024-03-17 PROCEDURE — 20600001 HC STEP DOWN PRIVATE ROOM

## 2024-03-17 RX ORDER — INSULIN ASPART 100 [IU]/ML
0-5 INJECTION, SOLUTION INTRAVENOUS; SUBCUTANEOUS
Status: DISCONTINUED | OUTPATIENT
Start: 2024-03-17 | End: 2024-03-20 | Stop reason: HOSPADM

## 2024-03-17 RX ORDER — IBUPROFEN 200 MG
24 TABLET ORAL
Status: DISCONTINUED | OUTPATIENT
Start: 2024-03-17 | End: 2024-03-20 | Stop reason: HOSPADM

## 2024-03-17 RX ORDER — TAMSULOSIN HYDROCHLORIDE 0.4 MG/1
1 CAPSULE ORAL DAILY
Status: DISCONTINUED | OUTPATIENT
Start: 2024-03-17 | End: 2024-03-20 | Stop reason: HOSPADM

## 2024-03-17 RX ORDER — INSULIN ASPART 100 [IU]/ML
3 INJECTION, SOLUTION INTRAVENOUS; SUBCUTANEOUS
Status: DISCONTINUED | OUTPATIENT
Start: 2024-03-17 | End: 2024-03-18

## 2024-03-17 RX ORDER — POLYETHYLENE GLYCOL 3350 17 G/17G
17 POWDER, FOR SOLUTION ORAL DAILY
Status: DISCONTINUED | OUTPATIENT
Start: 2024-03-17 | End: 2024-03-17

## 2024-03-17 RX ORDER — IBUPROFEN 200 MG
16 TABLET ORAL
Status: DISCONTINUED | OUTPATIENT
Start: 2024-03-17 | End: 2024-03-20 | Stop reason: HOSPADM

## 2024-03-17 RX ORDER — TALC
6 POWDER (GRAM) TOPICAL NIGHTLY PRN
Status: DISCONTINUED | OUTPATIENT
Start: 2024-03-17 | End: 2024-03-20 | Stop reason: HOSPADM

## 2024-03-17 RX ORDER — GLUCAGON 1 MG
1 KIT INJECTION
Status: DISCONTINUED | OUTPATIENT
Start: 2024-03-17 | End: 2024-03-20 | Stop reason: HOSPADM

## 2024-03-17 RX ADMIN — ACETAMINOPHEN 650 MG: 325 TABLET ORAL at 12:03

## 2024-03-17 RX ADMIN — ASPIRIN 81 MG CHEWABLE TABLET 81 MG: 81 TABLET CHEWABLE at 08:03

## 2024-03-17 RX ADMIN — APIXABAN 2.5 MG: 2.5 TABLET, FILM COATED ORAL at 09:03

## 2024-03-17 RX ADMIN — PIPERACILLIN SODIUM AND TAZOBACTAM SODIUM 4.5 G: 4; .5 INJECTION, POWDER, FOR SOLUTION INTRAVENOUS at 03:03

## 2024-03-17 RX ADMIN — INSULIN DETEMIR 7 UNITS: 100 INJECTION, SOLUTION SUBCUTANEOUS at 09:03

## 2024-03-17 RX ADMIN — INSULIN DETEMIR 7 UNITS: 100 INJECTION, SOLUTION SUBCUTANEOUS at 12:03

## 2024-03-17 RX ADMIN — INSULIN ASPART 3 UNITS: 100 INJECTION, SOLUTION INTRAVENOUS; SUBCUTANEOUS at 04:03

## 2024-03-17 RX ADMIN — PIPERACILLIN SODIUM AND TAZOBACTAM SODIUM 4.5 G: 4; .5 INJECTION, POWDER, FOR SOLUTION INTRAVENOUS at 06:03

## 2024-03-17 RX ADMIN — LOSARTAN POTASSIUM 25 MG: 25 TABLET, FILM COATED ORAL at 04:03

## 2024-03-17 RX ADMIN — TAMSULOSIN HYDROCHLORIDE 0.4 MG: 0.4 CAPSULE ORAL at 04:03

## 2024-03-17 RX ADMIN — INSULIN HUMAN 0.6 UNITS/HR: 1 INJECTION, SOLUTION INTRAVENOUS at 08:03

## 2024-03-17 RX ADMIN — INSULIN ASPART 3 UNITS: 100 INJECTION, SOLUTION INTRAVENOUS; SUBCUTANEOUS at 01:03

## 2024-03-17 RX ADMIN — INSULIN ASPART 1 UNITS: 100 INJECTION, SOLUTION INTRAVENOUS; SUBCUTANEOUS at 09:03

## 2024-03-17 RX ADMIN — LACOSAMIDE 100 MG: 50 TABLET, FILM COATED ORAL at 08:03

## 2024-03-17 RX ADMIN — APIXABAN 2.5 MG: 2.5 TABLET, FILM COATED ORAL at 08:03

## 2024-03-17 RX ADMIN — Medication 6 MG: at 12:03

## 2024-03-17 RX ADMIN — PIPERACILLIN SODIUM AND TAZOBACTAM SODIUM 4.5 G: 4; .5 INJECTION, POWDER, FOR SOLUTION INTRAVENOUS at 10:03

## 2024-03-17 RX ADMIN — SODIUM CHLORIDE: 9 INJECTION, SOLUTION INTRAVENOUS at 04:03

## 2024-03-17 RX ADMIN — SODIUM CHLORIDE: 9 INJECTION, SOLUTION INTRAVENOUS at 03:03

## 2024-03-17 RX ADMIN — Medication 6 MG: at 09:03

## 2024-03-17 RX ADMIN — ATORVASTATIN CALCIUM 40 MG: 40 TABLET, FILM COATED ORAL at 08:03

## 2024-03-17 RX ADMIN — AMIODARONE HYDROCHLORIDE 200 MG: 200 TABLET ORAL at 08:03

## 2024-03-17 RX ADMIN — LACOSAMIDE 100 MG: 50 TABLET, FILM COATED ORAL at 09:03

## 2024-03-17 NOTE — SUBJECTIVE & OBJECTIVE
Interval history:  No overnight events.  Patient reports significant improvement in symptoms.  Insulin drip discontinued.  Diet advanced.  Review of Systems   Constitutional:  Negative for chills and fever.   Eyes:  Negative for visual disturbance.   Respiratory:  Negative for cough and shortness of breath.    Cardiovascular:  Negative for chest pain.   Gastrointestinal:  Positive for diarrhea and vomiting. Negative for nausea.   Genitourinary:  Positive for difficulty urinating. Negative for dysuria.   Musculoskeletal:  Positive for back pain.   Skin:  Positive for wound.   Neurological:  Positive for dizziness and light-headedness. Negative for headaches.   Psychiatric/Behavioral:  Positive for decreased concentration.      Objective:     Vital Signs (Most Recent):  Temp: 98.1 °F (36.7 °C) (03/17/24 1202)  Pulse: 73 (03/17/24 1325)  Resp: (!) 24 (03/17/24 1325)  BP: (!) 169/88 (03/17/24 1202)  SpO2: (!) 94 % (03/17/24 1500) Vital Signs (24h Range):  Temp:  [97.6 °F (36.4 °C)-98.3 °F (36.8 °C)] 98.1 °F (36.7 °C)  Pulse:  [62-73] 73  Resp:  [14-24] 24  SpO2:  [90 %-97 %] 94 %  BP: (131-185)/(67-88) 169/88   Weight: 75.8 kg (167 lb 1.7 oz)  Body mass index is 21.46 kg/m².      Intake/Output Summary (Last 24 hours) at 3/17/2024 1541  Last data filed at 3/17/2024 0425  Gross per 24 hour   Intake --   Output 400 ml   Net -400 ml          Physical Exam  Vitals reviewed.   Constitutional:       General: He is not in acute distress.     Appearance: Normal appearance. He is ill-appearing.   HENT:      Head: Normocephalic and atraumatic.      Nose: No congestion or rhinorrhea.      Mouth/Throat:      Mouth: Mucous membranes are dry.      Pharynx: Oropharynx is clear.   Cardiovascular:      Rate and Rhythm: Regular rhythm. Tachycardia present.      Pulses: Normal pulses.      Heart sounds: Normal heart sounds.   Pulmonary:      Effort: Pulmonary effort is normal. No respiratory distress.      Breath sounds: Normal breath  sounds.   Abdominal:      General: Bowel sounds are normal.      Palpations: Abdomen is soft.      Tenderness: There is no abdominal tenderness.   Genitourinary:     Comments: Mccord with cloudy yellow output  Musculoskeletal:         General: No swelling or tenderness.      Cervical back: Full passive range of motion without pain and normal range of motion.   Skin:     General: Skin is warm and dry.      Comments: Minor sacral wound, no skin breakdown   Neurological:      General: No focal deficit present.      Mental Status: He is alert and oriented to person, place, and time. Mental status is at baseline.   Psychiatric:         Mood and Affect: Mood normal.         Behavior: Behavior normal.            Vents:     Lines/Drains/Airways       Drain  Duration             Male External Urinary Catheter 03/16/24 0730 Large 1 day              Peripheral Intravenous Line  Duration                  Peripheral IV - Single Lumen 03/15/24 1905 18 G Anterior;Right Forearm 1 day         Peripheral IV - Single Lumen 03/15/24 1905 20 G Anterior;Left Upper Arm 1 day         Peripheral IV - Single Lumen 03/15/24 2028 18 G Anterior;Left Forearm 1 day                  Significant Labs:    CBC/Anemia Profile:  Recent Labs   Lab 03/15/24  1907 03/16/24  0246 03/17/24  0353   WBC 8.87 10.75 10.92   HGB 10.7* 9.6* 9.9*   HCT 36.5* 30.9* 32.0*    153 224   MCV 90 86 84   RDW 17.0* 16.4* 17.0*          Chemistries:  Recent Labs   Lab 03/15/24  1907 03/15/24  2349 03/15/24  2352 03/16/24  0246 03/16/24  0813 03/17/24  0353 03/17/24  0806 03/17/24  1154   *  --    < > 135*   < > 134* 135* 136   K 5.3*  --    < > 3.2*   < > 4.2 3.7 3.8   CL 99  --    < > 102   < > 107 108 107   CO2 6*  --    < > 11*   < > 22* 22* 24   BUN 36*  --    < > 35*   < > 21 18 17   CREATININE 2.1*  --    < > 2.4*   < > 1.5* 1.4 1.3   CALCIUM 8.6*  --    < > 8.2*   < > 7.9* 7.9* 8.2*   ALBUMIN 2.6*  --   --   --   --   --   --   --    PROT 6.4  --   --    --   --   --   --   --    BILITOT 0.4  --   --   --   --   --   --   --    ALKPHOS 131  --   --   --   --   --   --   --    ALT 13  --   --   --   --   --   --   --    AST 18  --   --   --   --   --   --   --    MG 1.8  --   --  1.6  --  1.7  --   --    PHOS 6.3* 6.5*  --  3.3  --   --   --   --     < > = values in this interval not displayed.         All pertinent labs within the past 24 hours have been reviewed.    Significant Imaging: I have reviewed all pertinent imaging results/findings within the past 24 hours.

## 2024-03-17 NOTE — ASSESSMENT & PLAN NOTE
"See "DKA" for specifics for management. Patient presented with hypotension and MAP < 60 in the ED, despite 2L IVF. Started on peripheral levophed. Lactic elevated at 7 and pt acidemic. Likely due to dehydration in DKA and continued GI losses from diarrhea; though there may be an underlying infectious etiology as well. On exam, patient with cloudy, purulent appearing urine and had been recently hospitalized for suspected UTI though Ucx was negative.     Resolved       "

## 2024-03-17 NOTE — NURSING
Saida Dawn MD gave a verbal order to skip 0000 sliding scale insulin and to increase continuous insulin drip to 0.8 units/hour from 0.6 units/hour under the same order.

## 2024-03-17 NOTE — CONSULTS
"Food & Nutrition  Education    Diet Education: DM edu  Time Spent: 5 minutes  Learners: Patient      Nutrition Education provided with handouts: none, unable to provide at this time      Comments: Unable to provide diet teaching at this time. RD plans to follow up tomorrow. Per chart, repeat diet educations for diabetes. Patient presents from NH with hypotension, vomiting, and diarrhea. Found to have DKA and UTI. A1c 8.7% on 2/29/24.     Barrier to learning: non-compliance. Patient resides in NH.      Follow-Up: Yes    Please Re-consult as needed        Thanks!    Heather Troy (Gabby), MS, RD, LDN      "

## 2024-03-17 NOTE — ASSESSMENT & PLAN NOTE
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     Plan:  - Continue ASA 81mg and Atorvastatin 80mg qd.  - Monitor for and prevent skin breakdown and pressure ulcers, wound care consult as needed  - Early mobility, OOB in chair at least 3 hours per day, repositioning/weight shifting every 20-30 minutes when sitting, turn patient every 2 hours, proper mattress/overlay and chair cushioning, pressure relief/heel protector boots  - DVT prophylaxis covered with Eliquis 2.5 BID (79 yo and Cr 2.1)

## 2024-03-17 NOTE — PROGRESS NOTES
"[facility] ([facility]-)  Endocrinology  Progress Note    Admit Date: 3/15/2024     Kamar Muñoz is an 78-year-old man with ketosis-prone type 2 DM, CVA with residual left-sided deficits, bed-bound at baseline, HTN, nephrolithiasis, paroxysmal AFib on Eliquis, CAD s/p MI, who presents with hypotension, vomiting, and diarrhea from NH. Patient was somnolent at time of evaluation so history was obtained from chart review.    "Patient reports he has been having diarrhea about 5 times a day for the last several days, and one episode of emesis today, with some abdominal cramping. He also reports ongoing dizziness and lightheadedness. He is a somewhat poor historian but he denies any recent changes to his medications. On review of systems, he denies SOB, chest pain, blood in his stool or vomit, and reports no abdominal pain at present. Patient was recently hospitalized from 2/29 to 3/4 due to episode of hypoxemia and hypotension, thought to be due to symptomatic bradycardia. He was discharged with his beta blocker discontinued, and received IVF and antibiotics for UTI.     In the ED, patient tachycardic HR 100s, hypotensive 71/35 and received 2L of LR with BP only temporarily responsive, so pt was started on peripheral levophed. Initial labs notable for K 5.3, Bicarb 6, , BHB 6.1. Lactate 7.5. pH 7.121, CO2 24.9. Patient admitted to the MICU for DKA and shock requiring pressors."    Last admission Feb/2024 final endocrine recs from inpatient team for insulin regimen at [place] were as follows:  Recommend resuming home regimen Levemir 6 units BID and Novolog 5 units plus SSI LDC (150/50) and Januvia 50mg QD.      Diabetes Complications include:     Hyperglycemia, Hypoglycemia , and Diabetic chronic kidney disease          Complicating diabetes co morbidities:   History of MI, History of CVA, and CKD    Hemoglobin A1C   Date Value Ref Range Status   02/29/2024 8.7 (H) 4.0 - 5.6 % Final     Comment:    "  ADA Screening Guidelines:  5.7-6.4%  Consistent with prediabetes  >or=6.5%  Consistent with diabetes    High levels of fetal hemoglobin interfere with the HbA1C  assay. Heterozygous hemoglobin variants (HbS, HgC, etc)do  not significantly interfere with this assay.   However, presence of multiple variants may affect accuracy.     02/28/2023 8.9 (H) 4.0 - 5.6 % Final     Comment:     ADA Screening Guidelines:  5.7-6.4%  Consistent with prediabetes  >or=6.5%  Consistent with diabetes    High levels of fetal hemoglobin interfere with the HbA1C  assay. Heterozygous hemoglobin variants (HbS, HgC, etc)do  not significantly interfere with this assay.   However, presence of multiple variants may affect accuracy.     10/05/2022 10.6 (H) 4.0 - 5.6 % Final     Comment:     ADA Screening Guidelines:  5.7-6.4%  Consistent with prediabetes  >or=6.5%  Consistent with diabetes    High levels of fetal hemoglobin interfere with the HbA1C  assay. Heterozygous hemoglobin variants (HbS, HgC, etc)do  not significantly interfere with this assay.   However, presence of multiple variants may affect accuracy.           Interval HPI:   Overnight events:   Patient fells better today, feels like he could eat now. No acute events overnight.    BP (!) 185/83   Pulse 67   Temp 98.1 °F (36.7 °C) (Oral)   Resp 18   Wt 75.8 kg (167 lb 1.7 oz)   SpO2 (!) 94%   BMI 21.46 kg/m²     Labs Reviewed and Include    Recent Labs   Lab 03/17/24  0806   *   CALCIUM 7.9*   *   K 3.7   CO2 22*      BUN 18   CREATININE 1.4     Lab Results   Component Value Date    WBC 10.92 03/17/2024    HGB 9.9 (L) 03/17/2024    HCT 32.0 (L) 03/17/2024    MCV 84 03/17/2024     03/17/2024     Recent Labs   Lab 03/15/24  1907   TSH 0.412     Lab Results   Component Value Date    HGBA1C 8.7 (H) 02/29/2024       Nutritional status:   Body mass index is 21.46 kg/m².  Lab Results   Component Value Date    ALBUMIN 2.6 (L) 03/15/2024    ALBUMIN 2.6 (L)  "03/04/2024    ALBUMIN 2.7 (L) 03/03/2024     No results found for: "PREALBUMIN"    Estimated Creatinine Clearance: 45.1 mL/min (based on SCr of 1.4 mg/dL).    Accu-Checks  Recent Labs     03/16/24  1007 03/16/24  1057 03/16/24  1221 03/16/24  1351 03/16/24  1629 03/16/24  1936 03/17/24  0057 03/17/24  0319 03/17/24  0847 03/17/24  1135   POCTGLUCOSE 93 102 145* 131* 168* 185* 284* 247* 139* 124*       Current Medications and/or Treatments Impacting Glycemic Control  Immunotherapy:    Immunosuppressants       None          Steroids:   Hormones (From admission, onward)      Start     Stop Route Frequency Ordered    03/17/24 0140  melatonin tablet 6 mg         -- Oral Nightly PRN 03/17/24 0040          Pressors:    Autonomic Drugs (From admission, onward)      None          Hyperglycemia/Diabetes Medications:   Antihyperglycemics (From admission, onward)      Start     Stop Route Frequency Ordered    03/17/24 1215  insulin aspart U-100 pen 3 Units         -- SubQ 3 times daily with meals 03/17/24 1109    03/17/24 1208  insulin aspart U-100 pen 0-5 Units         -- SubQ Before meals & nightly PRN 03/17/24 1109    03/17/24 1115  insulin detemir U-100 (Levemir) pen 6 Units         -- SubQ 2 times daily 03/17/24 1109            ASSESSMENT and PLAN    Renal/  BRENDAN (acute kidney injury)  Creatinine at admission 1.9 from baseline around 1.2.  Insulin could stay in his system for longer since it is renally cleared, we will be conservative with insulin adjustments.      Endocrine  DKA (diabetic ketoacidosis)  Now resolved      Type 2 diabetes mellitus with hyperglycemia, with long-term current use of insulin  Key History and Diagnostic Findings  Suspected Etiology for DKA: UTI  Lab Results   Component Value Date    BHYDRXBUT 6.1 (H) 03/15/2024    BHYDRXBUT 5.3 (H) 03/23/2023    CO2 19 (L) 03/16/2024    CO2 19 (L) 03/16/2024    CO2 11 (L) 03/16/2024    CO2 <5 (LL) 03/15/2024    CO2 <5 (LL) 03/15/2024    ANIONGAP 12 03/16/2024 "    ANIONGAP 12 03/16/2024    ANIONGAP 22 (H) 03/16/2024    ANIONGAP Unable to calculate 03/15/2024    ANIONGAP Unable to calculate 03/15/2024    CREATININE 1.9 (H) 03/16/2024    CREATININE 2.0 (H) 03/16/2024        Plan    -Stop insulin gtt and transition patient to SQ insulin  - Levemir 7 units BID  - Aspart 3 units TIDAC if eating + LDSSI  - Recommend diabetic diet when tolerated  - POCT glucose check ACHS   - Daily BMP  - Will follow for home insulin needs - likely sending him back to [facility] on the same regimen he was doing as an outpt.        Saida Santamaria MD  Endocrinology  [facility] ([place])

## 2024-03-17 NOTE — ASSESSMENT & PLAN NOTE
Patient presented with 5 days of diarrhea from nursing home with DKA, hypotension requiring pressor support. Diarrhea apparently persistent issue as previous hospitalization also had report of diarrhea. Likely contributing to dehydration and hypovolemic state.     - Daily CMP to assess for electrolyte imbalances.  -    Resolved

## 2024-03-17 NOTE — HPI
An 78-year-old bed-bound man with past stroke with residual left-sided weakness, CAD, HTN, paroxysmal AFib on Eliquis, and history of nephrolithiasis he was brought in for evaluation due to hypotension from his nursing home.  This was associated with nausea, vomiting, and diarrhea for several days prior to admission.  He additionally experienced dizziness and lightheadedness.  He was evaluated in [facility] ED where he was noted to be hypotensive, and tachycardic.  He was given call you for placement and started on pressor therapy.  Laboratory workup done revealed evidence of diabetic ketoacidosis in pyuria.  He was admitted to the MICU for further management and subsequently stepped down to hospital medicine service.  Admission blood cultures are positive now 1/4 bottles for GPCs in clusters resembling staph with rapid ID positive for Staph epidermidis.  Urine culture collected from a non catheterized urine sample is positive for Candida glabrata.    Infectious diseases consulted for pos blood cultues

## 2024-03-17 NOTE — ASSESSMENT & PLAN NOTE
Admission sCR 2.1, previous Cr from 11d ago 1.2 (baseline appears to be .9-1.1).  Likely pre-renal from dehydration and volume losses from diarrhea. Patient appears dry on exam.     Plan:  - Patient received IVF as part of DKA protocol for volume resuscitation.  - If BRENDAN unimproved on repeat labs, consider obtaining Urine Na, urea and urine protein/creatinine ratio, and RP U/S to evaluate for other causes of BRENDAN.  - Strict I&Os  - Avoid nephrotoxic agents such as NSAIDs, gadolinium and IV radiocontrast.  - Renally dose meds to current GFR.    Resolved

## 2024-03-17 NOTE — ASSESSMENT & PLAN NOTE
Patient with 1/4 positive blood cultures GPCs in clusters now identified as S epidermidis on rapid ID.  Patient is afebrile and without leukocytosis.   Vancomycin discontinued

## 2024-03-17 NOTE — ASSESSMENT & PLAN NOTE
I have reviewed hospital notes from   service and other specialty providers. I have also reviewed CBC, CMP/BMP,  cultures and imaging with my interpretation as documented.     Patient with 1/4 positive blood cultures GPCs in clusters now identified as S epidermidis on rapid ID.  Patient is afebrile and without leukocytosis.  One of 4 bottles positive for a coag-negative staph as in this case with S epidermidis is usually representative of colonization or contamination of the blood culture bottles especially in the absence of fever and leukocytosis.  As per discussion with primary team staff, would recommend to repeat the blood cultures via sterile technique and at least 15 minutes apart to assess if patient is truly bacteremic.  If those repeat blood cultures ordered for today remain no growth then the prior positive blood culture is confirmed to be contaminant and no further therapy is required.  If blood if blood cultures repeated today subsequently become positive for the same organism with the same susceptibilities then it may represent a true bacteremia which time would recommend repeat infectious disease consultation.  Discussed management plan with the staff and/or members from  service.

## 2024-03-17 NOTE — ASSESSMENT & PLAN NOTE
On eliquis 5 BID, but given current reduced renal function and his age, meets criteria for reduced dose.     - Continue Eliquis 2.5 BID

## 2024-03-17 NOTE — PLAN OF CARE
Problem: Adjustment to Illness (Delirium)  Goal: Optimal Coping  Outcome: Ongoing, Progressing     Problem: Altered Behavior (Delirium)  Goal: Improved Behavioral Control  Outcome: Ongoing, Progressing     Problem: Attention and Thought Clarity Impairment (Delirium)  Goal: Improved Attention and Thought Clarity  Outcome: Ongoing, Progressing     Problem: Sleep Disturbance (Delirium)  Goal: Improved Sleep  Outcome: Ongoing, Progressing     Problem: Adult Inpatient Plan of Care  Goal: Plan of Care Review  Outcome: Ongoing, Progressing  Goal: Patient-Specific Goal (Individualized)  Outcome: Ongoing, Progressing  Goal: Absence of Hospital-Acquired Illness or Injury  Outcome: Ongoing, Progressing  Goal: Optimal Comfort and Wellbeing  Outcome: Ongoing, Progressing  Goal: Readiness for Transition of Care  Outcome: Ongoing, Progressing     Problem: Diabetic Ketoacidosis  Goal: Fluid and Electrolyte Balance with Absence of Ketosis  Outcome: Ongoing, Progressing     Problem: Diabetes Comorbidity  Goal: Blood Glucose Level Within Targeted Range  Outcome: Ongoing, Progressing     Problem: Fluid and Electrolyte Imbalance (Acute Kidney Injury/Impairment)  Goal: Fluid and Electrolyte Balance  Outcome: Ongoing, Progressing     Problem: Oral Intake Inadequate (Acute Kidney Injury/Impairment)  Goal: Optimal Nutrition Intake  Outcome: Ongoing, Progressing     Problem: Renal Function Impairment (Acute Kidney Injury/Impairment)  Goal: Effective Renal Function  Outcome: Ongoing, Progressing     Problem: Impaired Wound Healing  Goal: Optimal Wound Healing  Outcome: Ongoing, Progressing     Problem: Fall Injury Risk  Goal: Absence of Fall and Fall-Related Injury  Outcome: Ongoing, Progressing    Patient alert and cooperative with care. Medicated as ordered. Iv insulin discontinued this shift. Continues on AC/HS blood sugars with insulin coverage. Tolerating well. Continues on iv NS @ 125/hr. Assisted with meal set up. Patient consumed  approx 75% of lunch this shift. Patient is resting comfortably at this time with call bell in reach

## 2024-03-17 NOTE — SUBJECTIVE & OBJECTIVE
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    Hyperlipidemia     Hyperosmolar hyperglycemic state (HHS) 01/29/2022    Hypertension     Hypoglycemia unawareness associated with type 2 diabetes mellitus 04/08/2022    Kidney stone     Migraine headache     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 08/02/2012    Paroxysmal atrial fibrillation     Pressure sore     STEMI involving right coronary artery 01/09/2022    Type II or unspecified type diabetes mellitus with neurological manifestations, uncontrolled(250.62) 03/08/2013    Urinary tract infection     UTI (urinary tract infection) 02/28/2023       Past Surgical History:   Procedure Laterality Date    BACK SURGERY      CATARACT EXTRACTION W/  INTRAOCULAR LENS IMPLANT Right     Per Dr Romero note 11/2018    COLONOSCOPY N/A 01/28/2019    Procedure: COLONOSCOPY Suprep;  Surgeon: Anh Johnson MD;  Location: [facility];  Service: Endoscopy;  Laterality: N/A;    ESOPHAGOGASTRODUODENOSCOPY N/A 09/15/2022    Procedure: EGD (ESOPHAGOGASTRODUODENOSCOPY);  Surgeon: Dashawn Evans MD;  Location: [facility];  Service: Endoscopy;  Laterality: N/A;    EYE SURGERY      HERNIA REPAIR      INJECTION, SACROILIAC JOINT Left 09/28/2023    Procedure: INJECTION,SACROILIAC JOINT, LEFT SI;  Surgeon: Lucas Ramirez MD;  [facility];  Service: Pain Management;  Laterality: Left;    KYPHOPLASTY, SPINE, LUMBAR N/A 02/01/2023    Procedure: KYPHOPLASTY, SPINE, LUMBAR;  Surgeon: Kimberly Spicer MD;  Location: [facility] [place] [facility];  Service: Neurosurgery;  Laterality: N/A;  Ane: GenBlood: Type &  HoldPos: ProneRad: C-arm x 2 spec Equip: [employer] - Tray Cortez    LEFT HEART CATHETERIZATION Left 01/09/2022    Procedure: CATHETERIZATION, HEART, LEFT;  Surgeon: Will Hurst III, MD;  Location: [facility] [place] CATH LAB[facility]EP;  Service: Cardiology;  Laterality: Left;    renal stones      SHOULDER OPEN ROTATOR CUFF REPAIR         Review of patient's allergies indicates:   Allergen Reactions    Iodine      Other reaction(s): swelling  Other reaction(s): Itching  Other reaction(s): Rash / IVP       Medications:  Medications Prior to Admission   Medication Sig    acetaminophen (TYLENOL) 500 MG tablet Take 1 tablet (500 mg total) by mouth every 8 (eight) hours.    alendronate (FOSAMAX) 70 MG tablet Take 70 mg by mouth every 7 days.    amiodarone (PACERONE) 200 MG Tab Take 1 tablet (200 mg total) by mouth once daily.    ascorbic acid, vitamin C, (VITAMIN C) 500 MG tablet Take 500 mg by mouth 2 (two) times daily.    atorvastatin (LIPITOR) 40 MG tablet Take 1 tablet (40 mg total) by mouth once daily.    ELIQUIS 5 mg Tab Take 5 mg by mouth 2 (two) times daily.    glucose 4 GM chewable tablet Take 6 tablets (24 g total) by mouth as needed for Low blood sugar (< 50).    insulin detemir U-100, Levemir, 100 unit/mL (3 mL) SubQ InPn pen Inject 14 Units into the skin once daily.    insulin lispro 100 unit/mL injection Inject 4 Units into the skin 3 (three) times daily before meals.    lacosamide (VIMPAT) 100 mg Tab Take 100 mg by mouth every 12 (twelve) hours.    LANTUS SOLOSTAR U-100 INSULIN glargine 100 units/mL SubQ pen Inject into the skin.    losartan (COZAAR) 25 MG tablet Take 25 mg by mouth once daily.    metFORMIN (GLUCOPHAGE) 500 MG tablet Take 500 mg by mouth 2 (two) times daily.    nitroGLYCERIN (NITROSTAT) 0.4 MG SL tablet Place 1 tablet (0.4 mg total) under the tongue every 5 (five) minutes as needed for Chest pain.    ondansetron (ZOFRAN) 4 MG tablet Take 4 mg by mouth every 6 (six) hours as needed for Nausea.     pantoprazole (PROTONIX) 40 MG tablet Take 1 tablet (40 mg total) by mouth once daily.    polyethylene glycol (GLYCOLAX) 17 gram/dose powder Use cap to measure out (17 g) mix with a liquid and take by mouth once daily.    SITagliptin phosphate (JANUVIA) 50 MG Tab Take 1 tablet (50 mg total) by mouth once daily.    sucralfate (CARAFATE) 1 gram tablet Take 1 g by mouth 3 (three) times daily before meals.    tamsulosin (FLOMAX) 0.4 mg Cap Take 1 capsule by mouth once daily.    vitamin D (VITAMIN D3) 1000 units Tab Take 1,000 Units by mouth once daily.     Antibiotics (From admission, onward)      Start     Stop Route Frequency Ordered    03/16/24 0230  piperacillin-tazobactam (ZOSYN) 4.5 g in dextrose 5 % in water (D5W) 100 mL IVPB (MB+)         -- IV Every 8 hours (non-standard times) 03/16/24 0016    03/16/24 0116  vancomycin - pharmacy to dose  (vancomycin IVPB (PEDS and ADULTS))        See Hyperspace for full Linked Orders Report.    -- IV pharmacy to manage frequency 03/16/24 0016          Antifungals (From admission, onward)      None          Antivirals (From admission, onward)      None             Immunization History   Administered Date(s) Administered    COVID-19 Vaccine 02/06/2023    COVID-19, MRNA, LN-S, PF (Pfizer) (Purple Cap) 01/29/2021, 10/29/2021    COVID-19, mRNA, LNP-S, bivalent booster, PF (Moderna Omicron)12 + YEARS 02/06/2023    Influenza (FLUAD) - Quadrivalent - Adjuvanted - PF *Preferred* (65+) 01/19/2022    Influenza (FLUAD) - Trivalent - Adjuvanted - PF (65+) 10/10/2019, 10/10/2019    Influenza - High Dose - PF (65 years and older) 10/21/2011, 11/14/2014, 10/26/2015, 10/27/2016, 11/27/2017, 11/27/2017, 11/26/2018    Influenza - Quadrivalent - High Dose - PF (65 years and older) 10/02/2020, 10/02/2020, 09/03/2022    Influenza - Quadrivalent - PF *Preferred* (6 months and older) 10/26/2012, 10/04/2013    Influenza Split 10/27/2006, 10/08/2009, 10/26/2012, 10/26/2012, 10/04/2013    Pneumococcal  Conjugate - 13 Valent 10/27/2016    Pneumococcal Polysaccharide - 23 Valent 10/04/2013    Tdap 2018    Zoster Recombinant 2019, 2019, 2019, 2019       Family History       Problem Relation (Age of Onset)    Diabetes Father          Social History     Socioeconomic History    Marital status:    Tobacco Use    Smoking status: Former     Current packs/day: 0.00     Average packs/day: 1.5 packs/day for 25.0 years (37.5 ttl pk-yrs)     Types: Cigarettes     Start date: 1958     Quit date: 1983     Years since quittin.2    Smokeless tobacco: Never   Substance and Sexual Activity    Alcohol use: No    Drug use: No    Sexual activity: Yes     Partners: Female   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     Social Determinants of Health     Financial Resource Strain: Low Risk  (3/17/2024)    Overall Financial Resource Strain (CARDIA)     Difficulty of Paying Living Expenses: Not hard at all   Food Insecurity: No Food Insecurity (3/17/2024)    Hunger Vital Sign     Worried About Running Out of Food in the Last Year: Never true     Ran Out of Food in the Last Year: Never true   Transportation Needs: No Transportation Needs (3/17/2024)    PRAPARE - Transportation     Lack of Transportation (Medical): No     Lack of Transportation (Non-Medical): No   Physical Activity: Insufficiently Active (3/16/2024)    Exercise Vital Sign     Days of Exercise per Week: 3 days     Minutes of Exercise per Session: 20 min   Stress: No Stress Concern Present (3/17/2024)    [employer] - Occupational Stress Questionnaire     Feeling of Stress : Only a little   Social Connections: Moderately Isolated (3/16/2024)    Social Connection and Isolation Panel [NHANES]     Frequency of Communication with Friends and Family: Twice a week     Frequency of Social Gatherings with Friends and Family: Never     Attends [employer]  Services: 1 to 4 times per year     Active Member of Clubs or Organizations: No     Attends Club or Organization Meetings: Never     Marital Status:    Housing Stability: Low Risk  (3/17/2024)    Housing Stability Vital Sign     Unable to Pay for Housing in the Last Year: No     Number of Places Lived in the Last Year: 1     Unstable Housing in the Last Year: No     Review of Systems   Constitutional:  Negative for chills, fatigue and fever.   HENT:  Negative for ear pain, mouth sores, nosebleeds, postnasal drip, rhinorrhea, sinus pressure, sore throat, tinnitus, trouble swallowing and voice change.    Eyes:  Negative for photophobia, pain, redness and visual disturbance.   Respiratory:  Negative for apnea, cough, chest tightness, shortness of breath and wheezing.    Cardiovascular:  Negative for chest pain, palpitations and leg swelling.   Gastrointestinal:  Positive for abdominal pain. Negative for blood in stool, constipation, diarrhea, nausea and vomiting.   Endocrine: Negative for cold intolerance, heat intolerance, polydipsia and polyuria.   Genitourinary:  Negative for decreased urine volume, difficulty urinating, dysuria, flank pain, frequency, genital sores, hematuria, penile discharge, penile pain, penile swelling, scrotal swelling, testicular pain and urgency.   Musculoskeletal:  Negative for arthralgias, back pain, joint swelling, myalgias and neck pain.   Skin:  Negative for color change and rash.   Allergic/Immunologic: Negative for environmental allergies and food allergies.   Neurological:  Negative for dizziness, seizures, syncope, weakness, light-headedness, numbness and headaches.   Hematological:  Negative for adenopathy. Does not bruise/bleed easily.   Psychiatric/Behavioral:  Negative for agitation, confusion, decreased concentration, hallucinations, self-injury, sleep disturbance and suicidal ideas. The patient is not nervous/anxious.      Objective:     Vital Signs (Most  Recent):  Temp: 98 °F (36.7 °C) (03/17/24 0400)  Pulse: 62 (03/17/24 0418)  Resp: 14 (03/17/24 0400)  BP: 131/73 (03/17/24 0400)  SpO2: (!) 94 % (03/17/24 0859) Vital Signs (24h Range):  Temp:  [97.6 °F (36.4 °C)-98.3 °F (36.8 °C)] 98 °F (36.7 °C)  Pulse:  [62-78] 62  Resp:  [14-22] 14  SpO2:  [90 %-97 %] 94 %  BP: (131-177)/(67-93) 131/73     Weight: 75.8 kg (167 lb 1.7 oz)  Body mass index is 21.46 kg/m².    Estimated Creatinine Clearance: 45.1 mL/min (based on SCr of 1.4 mg/dL).     Physical Exam  Vitals reviewed.   Constitutional:       Appearance: He is well-developed.   HENT:      Head: Normocephalic and atraumatic.      Right Ear: External ear normal.      Left Ear: External ear normal.   Eyes:      Conjunctiva/sclera: Conjunctivae normal.   Neck:      Thyroid: No thyromegaly.   Cardiovascular:      Rate and Rhythm: Normal rate and regular rhythm.      Heart sounds: Normal heart sounds. No murmur heard.  Pulmonary:      Effort: Pulmonary effort is normal.      Breath sounds: Normal breath sounds. No wheezing or rales.   Abdominal:      General: Bowel sounds are decreased.      Palpations: Abdomen is soft. There is no mass.      Tenderness: There is abdominal tenderness in the right lower quadrant and periumbilical area. There is no rebound.   Musculoskeletal:         General: No swelling.      Cervical back: Normal range of motion and neck supple.      Right lower leg: No edema.      Left lower leg: No edema.   Lymphadenopathy:      Cervical: No cervical adenopathy.   Skin:     General: Skin is warm and dry.   Neurological:      Mental Status: He is alert and oriented to person, place, and time.   Psychiatric:         Behavior: Behavior normal.          Significant Labs: CBC:   Recent Labs   Lab 03/15/24  1907 03/16/24  0246 03/17/24  0353   WBC 8.87 10.75 10.92   HGB 10.7* 9.6* 9.9*   HCT 36.5* 30.9* 32.0*    153 224     CMP:   Recent Labs   Lab 03/15/24  1907 03/15/24  2208 03/17/24  0020  03/17/24  0353 03/17/24  0806   *   < > 136 134* 135*   K 5.3*   < > 4.2 4.2 3.7   CL 99   < > 107 107 108   CO2 6*   < > 22* 22* 22*   *   < > 277* 210* 152*   BUN 36*   < > 21 21 18   CREATININE 2.1*   < > 1.5* 1.5* 1.4   CALCIUM 8.6*   < > 8.2* 7.9* 7.9*   PROT 6.4  --   --   --   --    ALBUMIN 2.6*  --   --   --   --    BILITOT 0.4  --   --   --   --    ALKPHOS 131  --   --   --   --    AST 18  --   --   --   --    ALT 13  --   --   --   --    ANIONGAP 30*   < > 7* 5* 5*    < > = values in this interval not displayed.     Microbiology Results (last 7 days)       Procedure Component Value Units Date/Time    Blood culture [[id number]]     Order Status: Sent Specimen: Blood     Blood culture [[id number]]     Order Status: Sent Specimen: Blood     Urine culture [[id number]]  (Abnormal) Collected: 03/15/24 2120    Order Status: Completed Specimen: Urine Updated: 03/17/24 0429     Urine Culture, Routine BEBETO GLABRATA  > 100,000 cfu/ml  Treatment of asymptomatic candiduria is not recommended (except for   specific populations). Candida isolated in the urine typically   represents colonization. If an indwelling urinary catheter is present  it should be removed or replaced.      Narrative:      Specimen Source->Urine    Blood culture #1 **CANNOT BE ORDERED STAT** [[id number]] Collected: 03/15/24 1912    Order Status: Completed Specimen: Blood from Peripheral, Antecubital, Left Updated: 03/17/24 0248     Blood Culture, Routine Gram stain kasey bottle: Gram positive cocci in clusters resembling Staph      Results called to and read back by: Bharat Ramírez RN 03/17/2024  02:46    Rapid Organism ID by PCR (from Blood culture) [[id number]]  (Abnormal) Collected: 03/15/24 1912    Order Status: Completed Updated: 03/17/24 0153     Enterococcus faecalis Not Detected     Enterococcus faecium Not Detected     Listeria monocytogenes Not Detected     Staphylococcus spp. See species for ID     Staphylococcus aureus Not  Detected     Staphylococcus epidermidis Detected     Staphylococcus lugdunensis Not Detected     Streptococcus species Not Detected     Streptococcus agalactiae Not Detected     Streptococcus pneumoniae Not Detected     Streptococcus pyogenes Not Detected     Acinetobacter calcoaceticus/baumannii complex Not Detected     Bacteroides fragilis Not Detected     Enterobacterales Not Detected     Enterobacter cloacae complex Not Detected     Escherichia coli Not Detected     Klebsiella aerogenes Not Detected     Klebsiella oxytoca Not Detected     Klebsiella pneumoniae group Not Detected     Proteus Not Detected     Salmonella sp Not Detected     Serratia marcescens Not Detected     Haemophilus influenzae Not Detected     Neisseria meningtidis Not Detected     Pseudomonas aeruginosa Not Detected     Stenotrophomonas maltophilia Not Detected     Candida albicans Not Detected     Candida auris Not Detected     Candida glabrata Not Detected     Candida krusei Not Detected     Candida parapsilosis Not Detected     Candida tropicalis Not Detected     Cryptococcus neoformans/gattii Not Detected     CTX-M (ESBL ) Test Not Applicable     IMP (Carbapenem resistant) Test Not Applicable     KPC resistance gene (Carbapenem resistant) Test Not Applicable     mcr-1  Test Not Applicable     mec A/C  Detected     mec A/C and MREJ (MRSA) gene Test Not Applicable     NDM (Carbapenem resistant) Test Not Applicable     OXA-48-like (Carbapenem resistant) Test Not Applicable     van A/B (VRE gene) Test Not Applicable     VIM (Carbapenem resistant) Test Not Applicable    Blood culture #2 **CANNOT BE ORDERED STAT** [[id number]] Collected: 03/15/24 1911    Order Status: Completed Specimen: Blood from Peripheral, Wrist, Right Updated: 03/16/24 2022     Blood Culture, Routine No Growth to date      No Growth to date    Influenza A & B by Molecular [[id number]] Collected: 03/15/24 2000    Order Status: Completed Specimen: Nasopharyngeal  Swab Updated: 03/15/24 2132     Influenza A, Molecular Negative     Influenza B, Molecular Negative     Flu A & B Source Nasal swab            Significant Imaging: I have reviewed all pertinent imaging results/findings within the past 24 hours.

## 2024-03-17 NOTE — SUBJECTIVE & OBJECTIVE
"Interval HPI:   Overnight events:   Patient fells better today, feels like he could eat now. No acute events overnight.    BP (!) 185/83   Pulse 67   Temp 98.1 °F (36.7 °C) (Oral)   Resp 18   Wt 75.8 kg (167 lb 1.7 oz)   SpO2 (!) 94%   BMI 21.46 kg/m²     Labs Reviewed and Include    Recent Labs   Lab 03/17/24  0806   *   CALCIUM 7.9*   *   K 3.7   CO2 22*      BUN 18   CREATININE 1.4     Lab Results   Component Value Date    WBC 10.92 03/17/2024    HGB 9.9 (L) 03/17/2024    HCT 32.0 (L) 03/17/2024    MCV 84 03/17/2024     03/17/2024     Recent Labs   Lab 03/15/24  1907   TSH 0.412     Lab Results   Component Value Date    HGBA1C 8.7 (H) 02/29/2024       Nutritional status:   Body mass index is 21.46 kg/m².  Lab Results   Component Value Date    ALBUMIN 2.6 (L) 03/15/2024    ALBUMIN 2.6 (L) 03/04/2024    ALBUMIN 2.7 (L) 03/03/2024     No results found for: "PREALBUMIN"    Estimated Creatinine Clearance: 45.1 mL/min (based on SCr of 1.4 mg/dL).    Accu-Checks  Recent Labs     03/16/24  1007 03/16/24  1057 03/16/24  1221 03/16/24  1351 03/16/24  1629 03/16/24  1936 03/17/24  0057 03/17/24  0319 03/17/24  0847 03/17/24  1135   POCTGLUCOSE 93 102 145* 131* 168* 185* 284* 247* 139* 124*       Current Medications and/or Treatments Impacting Glycemic Control  Immunotherapy:    Immunosuppressants       None          Steroids:   Hormones (From admission, onward)      Start     Stop Route Frequency Ordered    03/17/24 0140  melatonin tablet 6 mg         -- Oral Nightly PRN 03/17/24 0040          Pressors:    Autonomic Drugs (From admission, onward)      None          Hyperglycemia/Diabetes Medications:   Antihyperglycemics (From admission, onward)      Start     Stop Route Frequency Ordered    03/17/24 1215  insulin aspart U-100 pen 3 Units         -- SubQ 3 times daily with meals 03/17/24 1109    03/17/24 1208  insulin aspart U-100 pen 0-5 Units         -- SubQ Before meals & nightly PRN " 03/17/24 1109    03/17/24 1115  insulin detemir U-100 (Levemir) pen 6 Units         -- SubQ 2 times daily 03/17/24 110

## 2024-03-17 NOTE — PROGRESS NOTES
[facility] - Stepdown [facility] ([place] [address])  [facility] Medicine  Progress Note    Patient Name: Kamar Muñoz  MRN: [id number]  Patient Class: IP- Inpatient   Admission Date: 3/15/2024  Length of Stay: 2 days  Attending Physician: Candelaria Waite MD  Primary Care Provider: Basim Guerrero MD        Subjective:     Principal Problem:Hypovolemic shock        HPI:  Kamar Muñoz is an 78-year-old man with IDDM, CVA with residual left-sided deficits, bed-bound at baseline, HTN, nephrolithiasis, paroxysmal AFib on Eliquis, CAD s/p MI, who presents with hypotension, vomiting, and diarrhea from NH. Patient reports he has been having diarrhea about 5 times a day for the last several days, and one episode of emesis today, with some abdominal cramping. He also reports ongoing dizziness and lightheadedness. He is a somewhat poor historian but he denies any recent changes to his medications. On review of systems, he denies SOB, chest pain, blood in his stool or vomit, and reports no abdominal pain at present. Patient was recently hospitalized from 2/29 to 3/4 due to episode of hypoxemia and hypotension, thought to be due to symptomatic bradycardia. He was discharged with his beta blocker discontinued, and received IVF and antibiotics for UTI.     In the ED, patient tachycardic HR 100s, hypotensive 71/35 and received 2L of LR with BP only temporarily responsive, so pt was started on peripheral levophed. Initial labs notable for K 5.3, Bicarb 6, , BHB 6.1. Lactate 7.5. pH 7.121, CO2 24.9. Patient admitted to the MICU for DKA and shock requiring pressors.    Overview/Hospital Course:   anion gap closed x2.  Patient was transitioned to subcutaneous insulin by endocrinology team.  Patient was step-down to hospital medicine team.  Diet was advanced.  Blood cultures grew staph epidermidis.  Most likely contaminant.  Vancomycin discontinued    Interval history:  No overnight events.  Patient reports significant  improvement in symptoms.  Insulin drip discontinued.  Diet advanced.  Review of Systems   Constitutional:  Negative for chills and fever.   Eyes:  Negative for visual disturbance.   Respiratory:  Negative for cough and shortness of breath.    Cardiovascular:  Negative for chest pain.   Gastrointestinal:  Positive for diarrhea and vomiting. Negative for nausea.   Genitourinary:  Positive for difficulty urinating. Negative for dysuria.   Musculoskeletal:  Positive for back pain.   Skin:  Positive for wound.   Neurological:  Positive for dizziness and light-headedness. Negative for headaches.   Psychiatric/Behavioral:  Positive for decreased concentration.      Objective:     Vital Signs (Most Recent):  Temp: 98.1 °F (36.7 °C) (03/17/24 1202)  Pulse: 73 (03/17/24 1325)  Resp: (!) 24 (03/17/24 1325)  BP: (!) 169/88 (03/17/24 1202)  SpO2: (!) 94 % (03/17/24 1500) Vital Signs (24h Range):  Temp:  [97.6 °F (36.4 °C)-98.3 °F (36.8 °C)] 98.1 °F (36.7 °C)  Pulse:  [62-73] 73  Resp:  [14-24] 24  SpO2:  [90 %-97 %] 94 %  BP: (131-185)/(67-88) 169/88   Weight: 75.8 kg (167 lb 1.7 oz)  Body mass index is 21.46 kg/m².      Intake/Output Summary (Last 24 hours) at 3/17/2024 1541  Last data filed at 3/17/2024 0425  Gross per 24 hour   Intake --   Output 400 ml   Net -400 ml          Physical Exam  Vitals reviewed.   Constitutional:       General: He is not in acute distress.     Appearance: Normal appearance. He is ill-appearing.   HENT:      Head: Normocephalic and atraumatic.      Nose: No congestion or rhinorrhea.      Mouth/Throat:      Mouth: Mucous membranes are dry.      Pharynx: Oropharynx is clear.   Cardiovascular:      Rate and Rhythm: Regular rhythm. Tachycardia present.      Pulses: Normal pulses.      Heart sounds: Normal heart sounds.   Pulmonary:      Effort: Pulmonary effort is normal. No respiratory distress.      Breath sounds: Normal breath sounds.   Abdominal:      General: Bowel sounds are normal.       Palpations: Abdomen is soft.      Tenderness: There is no abdominal tenderness.   Genitourinary:     Comments: Mccord with cloudy yellow output  Musculoskeletal:         General: No swelling or tenderness.      Cervical back: Full passive range of motion without pain and normal range of motion.   Skin:     General: Skin is warm and dry.      Comments: Minor sacral wound, no skin breakdown   Neurological:      General: No focal deficit present.      Mental Status: He is alert and oriented to person, place, and time. Mental status is at baseline.   Psychiatric:         Mood and Affect: Mood normal.         Behavior: Behavior normal.            Vents:     Lines/Drains/Airways       Drain  Duration             Male External Urinary Catheter 03/16/24 0730 Large 1 day              Peripheral Intravenous Line  Duration                  Peripheral IV - Single Lumen 03/15/24 1905 18 G Anterior;Right Forearm 1 day         Peripheral IV - Single Lumen 03/15/24 1905 20 G Anterior;Left Upper Arm 1 day         Peripheral IV - Single Lumen 03/15/24 2028 18 G Anterior;Left Forearm 1 day                  Significant Labs:    CBC/Anemia Profile:  Recent Labs   Lab 03/15/24  1907 03/16/24  0246 03/17/24  0353   WBC 8.87 10.75 10.92   HGB 10.7* 9.6* 9.9*   HCT 36.5* 30.9* 32.0*    153 224   MCV 90 86 84   RDW 17.0* 16.4* 17.0*          Chemistries:  Recent Labs   Lab 03/15/24  1907 03/15/24  2349 03/15/24  2352 03/16/24  0246 03/16/24  0813 03/17/24  0353 03/17/24  0806 03/17/24  1154   *  --    < > 135*   < > 134* 135* 136   K 5.3*  --    < > 3.2*   < > 4.2 3.7 3.8   CL 99  --    < > 102   < > 107 108 107   CO2 6*  --    < > 11*   < > 22* 22* 24   BUN 36*  --    < > 35*   < > 21 18 17   CREATININE 2.1*  --    < > 2.4*   < > 1.5* 1.4 1.3   CALCIUM 8.6*  --    < > 8.2*   < > 7.9* 7.9* 8.2*   ALBUMIN 2.6*  --   --   --   --   --   --   --    PROT 6.4  --   --   --   --   --   --   --    BILITOT 0.4  --   --   --   --   --    "--   --    ALKPHOS 131  --   --   --   --   --   --   --    ALT 13  --   --   --   --   --   --   --    AST 18  --   --   --   --   --   --   --    MG 1.8  --   --  1.6  --  1.7  --   --    PHOS 6.3* 6.5*  --  3.3  --   --   --   --     < > = values in this interval not displayed.         All pertinent labs within the past 24 hours have been reviewed.    Significant Imaging: I have reviewed all pertinent imaging results/findings within the past 24 hours.    Assessment/Plan:      * Hypovolemic shock  See "DKA" for specifics for management. Patient presented with hypotension and MAP < 60 in the ED, despite 2L IVF. Started on peripheral levophed. Lactic elevated at 7 and pt acidemic. Likely due to dehydration in DKA and continued GI losses from diarrhea; though there may be an underlying infectious etiology as well. On exam, patient with cloudy, purulent appearing urine and had been recently hospitalized for suspected UTI though Ucx was negative.     Resolved         Candiduria    Patient does not currently have any lower urinary tract symptoms. Additionally it appears that the urine specimen was collected via nonsterile technique and or via a non catheterized urine sample.   No antibiotics needed    Positive blood culture  Patient with 1/4 positive blood cultures GPCs in clusters now identified as S epidermidis on rapid ID.  Patient is afebrile and without leukocytosis.   Vancomycin discontinued      Diarrhea  Patient presented with 5 days of diarrhea from nursing home with DKA, hypotension requiring pressor support. Diarrhea apparently persistent issue as previous hospitalization also had report of diarrhea. Likely contributing to dehydration and hypovolemic state.     - Daily CMP to assess for electrolyte imbalances.  -    Resolved         Dyslipidemia associated with type 2 diabetes mellitus  Continue home atorvastatin 40 mg q.d.    DKA (diabetic ketoacidosis)  78M with hx of T2DM who presents with hypotension, " nausea/vomiting, and diarrhea. Pt denies any changes to his insulin and reports that he receives it regularly at his NH. However, he does not know his doses. Outside documentation suggests Levemir 30U and Aspart 4U with meals, though on our records, his home dose of levemir is 14U. Will go by weight based dosing  Initial labs with acidosis pH of 7.1, beta hydroxybutyrate of 6.1, lactate 7.5, and blood glucose of >700 consistent with DKA. Pt received 2L IVF initially in the ED with minimal improvement in BP so was started on peripheral levo. Cause of DKA may be an ongoing UTI; pt recently hospitalized for UTI and was discharged with PO antibiotics, however pt with cloudy and purulent appearing urine.     Recommendations:  - Continuous NS at 125 mL/hr. Once BGL <200, change IVF to D5 1/2 NS at 50 mL/hr  - Monitor for volume overload and pulmonary edema  - Insulin gtt per protocol, gtt @ 0.1U/kg/hr  - q1h glucose accuchecks while on insulin gtt     Resolved.    History of partial seizures  Documented history of seizures.     - Continue home Vimpat 100mg BID      Paroxysmal atrial fibrillation  On eliquis 5 BID, but given current reduced renal function and his age, meets criteria for reduced dose.     - Continue Eliquis 2.5 BID       History of embolic stroke  Debility     CVA with R frontal and L cerebellar infarcts on 1/12/2022  with residual LSW and physical debility as a result.     Plan:  - Continue ASA 81mg and Atorvastatin 80mg qd.  - Monitor for and prevent skin breakdown and pressure ulcers, wound care consult as needed  - Early mobility, OOB in chair at least 3 hours per day, repositioning/weight shifting every 20-30 minutes when sitting, turn patient every 2 hours, proper mattress/overlay and chair cushioning, pressure relief/heel protector boots  - DVT prophylaxis covered with Eliquis 2.5 BID (77 yo and Cr 2.1)      Coronary artery disease involving native coronary artery of native heart with unstable angina  pectoris  History of STEMI in 2021 with RCA LAUREN; LAD 70% medically managed.      - Continue ASA 81, and high dose statin 40mg daily.       BRENDAN (acute kidney injury)  Admission sCR 2.1, previous Cr from 11d ago 1.2 (baseline appears to be .9-1.1).  Likely pre-renal from dehydration and volume losses from diarrhea. Patient appears dry on exam.     Plan:  - Patient received IVF as part of DKA protocol for volume resuscitation.  - If BRENDAN unimproved on repeat labs, consider obtaining Urine Na, urea and urine protein/creatinine ratio, and RP U/S to evaluate for other causes of BRENDAN.  - Strict I&Os  - Avoid nephrotoxic agents such as NSAIDs, gadolinium and IV radiocontrast.  - Renally dose meds to current GFR.    Resolved    Type 2 diabetes mellitus with hyperglycemia, with long-term current use of insulin    See DKA    Essential hypertension  Chronic, controlled. Latest blood pressure and vitals reviewed-     Temp:  [97.6 °F (36.4 °C)-98.3 °F (36.8 °C)]   Pulse:  [62-73]   Resp:  [14-24]   BP: (131-185)/(67-88)   SpO2:  [90 %-97 %] .   Home meds for hypertension were reviewed and noted below.   Hypertension Medications               losartan (COZAAR) 25 MG tablet Take 25 mg by mouth once daily.    nitroGLYCERIN (NITROSTAT) 0.4 MG SL tablet Place 1 tablet (0.4 mg total) under the tongue every 5 (five) minutes as needed for Chest pain.            While in the hospital, will manage blood pressure as follows; Continue home antihypertensive regimen    Will utilize p.r.n. blood pressure medication only if patient's blood pressure greater than 180/110 and he develops symptoms such as worsening chest pain or shortness of breath.      VTE Risk Mitigation (From admission, onward)           Ordered     apixaban tablet 2.5 mg  2 times daily         03/16/24 0503                    Discharge Planning   ALLIE: 3/18/2024     Code Status: DNR   Is the patient medically ready for discharge?:     Reason for patient still in hospital (select  all that apply): Patient trending condition, Laboratory test, and Treatment  Discharge Plan A: Return to nursing home ([facility] Home Phone: [phone])                  Candelaria Waite MD  Department of Hospital Medicine   [facility] - Stepdown [facility] ([place]-[facility])

## 2024-03-17 NOTE — HOSPITAL COURSE
anion gap closed x2.  Patient was transitioned to subcutaneous insulin by endocrinology team.  Patient was step-down to hospital medicine team.  Diet was advanced.  Blood cultures grew staph epidermidis.  Most likely contaminant.  Vancomycin discontinued.  Patient was complaining of diarrhea, C diff testing was negative.  Stool studies were ordered.  Diarrhea improved with imodium.  Patient was given referral to Gastroenterology for further evaluation of diarrhea.  Patient transitioned to subcutaneous insulin 6 units b.i.d. and lispro 5 units t.i.d. with meals.    Kamar Muñoz was deemed appropriate for discharge.  At the time of discharge, the plan of care was discussed with patient/family, who were agreeable and amenable.  All medications were verbally reviewed and discussed with the patient/family.  They endorsed understanding and compliance.  Informed them that these changes will be available on their discharge paperwork, as well.  Outpatient follow-ups were scheduled, or if unable to be scheduled ambulatory referrals were placed, and ER return precautions were given.  All questions were answered to the patient/family's satisfaction.  He was subsequently discharged in stable condition.

## 2024-03-17 NOTE — ASSESSMENT & PLAN NOTE
Chronic, controlled. Latest blood pressure and vitals reviewed-     Temp:  [97.6 °F (36.4 °C)-98.3 °F (36.8 °C)]   Pulse:  [62-73]   Resp:  [14-24]   BP: (131-185)/(67-88)   SpO2:  [90 %-97 %] .   Home meds for hypertension were reviewed and noted below.   Hypertension Medications               losartan (COZAAR) 25 MG tablet Take 25 mg by mouth once daily.    nitroGLYCERIN (NITROSTAT) 0.4 MG SL tablet Place 1 tablet (0.4 mg total) under the tongue every 5 (five) minutes as needed for Chest pain.            While in the hospital, will manage blood pressure as follows; Continue home antihypertensive regimen    Will utilize p.r.n. blood pressure medication only if patient's blood pressure greater than 180/110 and he develops symptoms such as worsening chest pain or shortness of breath.

## 2024-03-17 NOTE — PROGRESS NOTES
03/16/24 1630   Vital Signs   Temp 97.8 °F (36.6 °C)   Temp Source Oral   Pulse 68   Resp 15   SpO2 95 %   Device (Oxygen Therapy) room air   BP (!) 157/67   MAP (mmHg) 97   BP Location Right arm   BP Method Automatic   Patient Position Lying   Assessments (Pre/Post)   Level of Consciousness (AVPU) alert     Patient transferred from ICU to [facility] on bed with insulin drip infusing at 0.6unit/hr. vital signs checked and documented as above. Patient on iv antibiotics. Patient on condom catheter. Patient on telemetry. Due meds administered. Patient is stable. No other concerns at this time.

## 2024-03-17 NOTE — ASSESSMENT & PLAN NOTE
Patient does not currently have any lower urinary tract symptoms. Additionally it appears that the urine specimen was collected via nonsterile technique and or via a non catheterized urine sample.   No antibiotics needed

## 2024-03-17 NOTE — ASSESSMENT & PLAN NOTE
Patient does not currently have any lower urinary tract symptoms.  Additionally it appears that the urine specimen was collected via nonsterile technique and or via a non catheterized urine sample.  Treatment of candiduria is not currently recommended for non immunocompromised patients.  If there is concern that this patient has a urinary tract infection, I would recommend collecting a catheterized urine sample, via in and out catheter, via sterile technique to assess if there is true pyuria and a urinary pathogen.  If repeat urine analysis and urine culture is suggestive of infection, when collected via in and out catheterized sample, then would treat based on culture results.

## 2024-03-17 NOTE — PROGRESS NOTES
Pharmacokinetic Assessment Follow Up: IV Vancomycin    Vancomycin order has been discontinued. Pharmacy will sign off. Please reconsult as needed!     Thank you for the consult,   Lai Alas

## 2024-03-17 NOTE — ASSESSMENT & PLAN NOTE
78M with hx of T2DM who presents with hypotension, nausea/vomiting, and diarrhea. Pt denies any changes to his insulin and reports that he receives it regularly at his NH. However, he does not know his doses. Outside documentation suggests Levemir 30U and Aspart 4U with meals, though on our records, his home dose of levemir is 14U. Will go by weight based dosing  Initial labs with acidosis pH of 7.1, beta hydroxybutyrate of 6.1, lactate 7.5, and blood glucose of >700 consistent with DKA. Pt received 2L IVF initially in the ED with minimal improvement in BP so was started on peripheral levo. Cause of DKA may be an ongoing UTI; pt recently hospitalized for UTI and was discharged with PO antibiotics, however pt with cloudy and purulent appearing urine.     Recommendations:  - Continuous NS at 125 mL/hr. Once BGL <200, change IVF to D5 1/2 NS at 50 mL/hr  - Monitor for volume overload and pulmonary edema  - Insulin gtt per protocol, gtt @ 0.1U/kg/hr  - q1h glucose accuchecks while on insulin gtt     Resolved.

## 2024-03-17 NOTE — ASSESSMENT & PLAN NOTE
Creatinine at admission 1.9 from baseline around 1.2.  Insulin could stay in his system for longer since it is renally cleared, we will be conservative with insulin adjustments.

## 2024-03-17 NOTE — PLAN OF CARE
Care plan reviewed.  Problem: Adjustment to Illness (Delirium)  Goal: Optimal Coping  Outcome: Ongoing, Progressing     Problem: Altered Behavior (Delirium)  Goal: Improved Behavioral Control  Outcome: Ongoing, Progressing     Problem: Attention and Thought Clarity Impairment (Delirium)  Goal: Improved Attention and Thought Clarity  Outcome: Ongoing, Progressing     Problem: Sleep Disturbance (Delirium)  Goal: Improved Sleep  Outcome: Ongoing, Progressing     Problem: Adult Inpatient Plan of Care  Goal: Plan of Care Review  Outcome: Ongoing, Progressing  Goal: Patient-Specific Goal (Individualized)  Outcome: Ongoing, Progressing  Goal: Absence of Hospital-Acquired Illness or Injury  Outcome: Ongoing, Progressing  Goal: Optimal Comfort and Wellbeing  Outcome: Ongoing, Progressing  Goal: Readiness for Transition of Care  Outcome: Ongoing, Progressing     Problem: Diabetic Ketoacidosis  Goal: Fluid and Electrolyte Balance with Absence of Ketosis  Outcome: Ongoing, Progressing     Problem: Diabetes Comorbidity  Goal: Blood Glucose Level Within Targeted Range  Outcome: Ongoing, Progressing     Problem: Fluid and Electrolyte Imbalance (Acute Kidney Injury/Impairment)  Goal: Fluid and Electrolyte Balance  Outcome: Ongoing, Progressing     Problem: Oral Intake Inadequate (Acute Kidney Injury/Impairment)  Goal: Optimal Nutrition Intake  Outcome: Ongoing, Progressing     Problem: Renal Function Impairment (Acute Kidney Injury/Impairment)  Goal: Effective Renal Function  Outcome: Ongoing, Progressing     Problem: Impaired Wound Healing  Goal: Optimal Wound Healing  Outcome: Ongoing, Progressing     Problem: Fall Injury Risk  Goal: Absence of Fall and Fall-Related Injury  Outcome: Ongoing, Progressing

## 2024-03-18 LAB
ANION GAP SERPL CALC-SCNC: 11 MMOL/L (ref 8–16)
ANION GAP SERPL CALC-SCNC: 7 MMOL/L (ref 8–16)
ANION GAP SERPL CALC-SCNC: 8 MMOL/L (ref 8–16)
BASOPHILS # BLD AUTO: 0.02 K/UL (ref 0–0.2)
BASOPHILS NFR BLD: 0.3 % (ref 0–1.9)
BUN SERPL-MCNC: 14 MG/DL (ref 8–23)
BUN SERPL-MCNC: 15 MG/DL (ref 8–23)
BUN SERPL-MCNC: 15 MG/DL (ref 8–23)
BUN SERPL-MCNC: 17 MG/DL (ref 8–23)
BUN SERPL-MCNC: 18 MG/DL (ref 8–23)
BUN SERPL-MCNC: 31 MG/DL (ref 6–30)
BUN SERPL-MCNC: 31 MG/DL (ref 6–30)
BUN SERPL-MCNC: 49 MG/DL (ref 6–30)
C DIFF GDH STL QL: NEGATIVE
C DIFF TOX A+B STL QL IA: NEGATIVE
CALCIUM SERPL-MCNC: 7.4 MG/DL (ref 8.7–10.5)
CALCIUM SERPL-MCNC: 7.6 MG/DL (ref 8.7–10.5)
CALCIUM SERPL-MCNC: 7.8 MG/DL (ref 8.7–10.5)
CALCIUM SERPL-MCNC: 8 MG/DL (ref 8.7–10.5)
CALCIUM SERPL-MCNC: 8.3 MG/DL (ref 8.7–10.5)
CHLORIDE SERPL-SCNC: 100 MMOL/L (ref 95–110)
CHLORIDE SERPL-SCNC: 101 MMOL/L (ref 95–110)
CHLORIDE SERPL-SCNC: 101 MMOL/L (ref 95–110)
CHLORIDE SERPL-SCNC: 102 MMOL/L (ref 95–110)
CHLORIDE SERPL-SCNC: 106 MMOL/L (ref 95–110)
CHLORIDE SERPL-SCNC: 106 MMOL/L (ref 95–110)
CHLORIDE SERPL-SCNC: 108 MMOL/L (ref 95–110)
CHLORIDE SERPL-SCNC: 108 MMOL/L (ref 95–110)
CO2 SERPL-SCNC: 19 MMOL/L (ref 23–29)
CO2 SERPL-SCNC: 20 MMOL/L (ref 23–29)
CO2 SERPL-SCNC: 21 MMOL/L (ref 23–29)
CO2 SERPL-SCNC: 22 MMOL/L (ref 23–29)
CO2 SERPL-SCNC: 23 MMOL/L (ref 23–29)
CREAT SERPL-MCNC: 1 MG/DL (ref 0.5–1.4)
CREAT SERPL-MCNC: 1.1 MG/DL (ref 0.5–1.4)
CREAT SERPL-MCNC: 1.2 MG/DL (ref 0.5–1.4)
CREAT SERPL-MCNC: 1.5 MG/DL (ref 0.5–1.4)
CREAT SERPL-MCNC: 1.8 MG/DL (ref 0.5–1.4)
CREAT SERPL-MCNC: 1.9 MG/DL (ref 0.5–1.4)
DIFFERENTIAL METHOD BLD: ABNORMAL
EOSINOPHIL # BLD AUTO: 0.2 K/UL (ref 0–0.5)
EOSINOPHIL NFR BLD: 2.5 % (ref 0–8)
ERYTHROCYTE [DISTWIDTH] IN BLOOD BY AUTOMATED COUNT: 17 % (ref 11.5–14.5)
EST. GFR  (NO RACE VARIABLE): >60 ML/MIN/1.73 M^2
GLUCOSE SERPL-MCNC: 110 MG/DL (ref 70–110)
GLUCOSE SERPL-MCNC: 118 MG/DL (ref 70–110)
GLUCOSE SERPL-MCNC: 163 MG/DL (ref 70–110)
GLUCOSE SERPL-MCNC: 216 MG/DL (ref 70–110)
GLUCOSE SERPL-MCNC: 340 MG/DL (ref 70–110)
GLUCOSE SERPL-MCNC: 81 MG/DL (ref 70–110)
GLUCOSE SERPL-MCNC: >700 MG/DL (ref 70–110)
GLUCOSE SERPL-MCNC: >700 MG/DL (ref 70–110)
HCT VFR BLD AUTO: 30.4 % (ref 40–54)
HCT VFR BLD CALC: 31 %PCV (ref 36–54)
HCT VFR BLD CALC: 38 %PCV (ref 36–54)
HCT VFR BLD CALC: 38 %PCV (ref 36–54)
HGB BLD-MCNC: 9.8 G/DL (ref 14–18)
IMM GRANULOCYTES # BLD AUTO: 0.03 K/UL (ref 0–0.04)
IMM GRANULOCYTES NFR BLD AUTO: 0.4 % (ref 0–0.5)
LYMPHOCYTES # BLD AUTO: 1.4 K/UL (ref 1–4.8)
LYMPHOCYTES NFR BLD: 19.1 % (ref 18–48)
MAGNESIUM SERPL-MCNC: 1.7 MG/DL (ref 1.6–2.6)
MCH RBC QN AUTO: 27.1 PG (ref 27–31)
MCHC RBC AUTO-ENTMCNC: 32.2 G/DL (ref 32–36)
MCV RBC AUTO: 84 FL (ref 82–98)
MONOCYTES # BLD AUTO: 0.6 K/UL (ref 0.3–1)
MONOCYTES NFR BLD: 8 % (ref 4–15)
NEUTROPHILS # BLD AUTO: 5.1 K/UL (ref 1.8–7.7)
NEUTROPHILS NFR BLD: 69.7 % (ref 38–73)
NRBC BLD-RTO: 0 /100 WBC
PLATELET # BLD AUTO: 207 K/UL (ref 150–450)
PMV BLD AUTO: 10.4 FL (ref 9.2–12.9)
POC IONIZED CALCIUM: 1.09 MMOL/L (ref 1.06–1.42)
POC IONIZED CALCIUM: 1.16 MMOL/L (ref 1.06–1.42)
POC IONIZED CALCIUM: 1.17 MMOL/L (ref 1.06–1.42)
POC TCO2 (MEASURED): 11 MMOL/L (ref 23–29)
POC TCO2 (MEASURED): 28 MMOL/L (ref 23–29)
POC TCO2 (MEASURED): 7 MMOL/L (ref 23–29)
POCT GLUCOSE: 117 MG/DL (ref 70–110)
POCT GLUCOSE: 166 MG/DL (ref 70–110)
POCT GLUCOSE: 213 MG/DL (ref 70–110)
POCT GLUCOSE: 215 MG/DL (ref 70–110)
POTASSIUM BLD-SCNC: 4.3 MMOL/L (ref 3.5–5.1)
POTASSIUM BLD-SCNC: 4.6 MMOL/L (ref 3.5–5.1)
POTASSIUM BLD-SCNC: 5.1 MMOL/L (ref 3.5–5.1)
POTASSIUM SERPL-SCNC: 3.8 MMOL/L (ref 3.5–5.1)
POTASSIUM SERPL-SCNC: 3.9 MMOL/L (ref 3.5–5.1)
RBC # BLD AUTO: 3.62 M/UL (ref 4.6–6.2)
SAMPLE: ABNORMAL
SODIUM BLD-SCNC: 133 MMOL/L (ref 136–145)
SODIUM BLD-SCNC: 134 MMOL/L (ref 136–145)
SODIUM BLD-SCNC: 136 MMOL/L (ref 136–145)
SODIUM SERPL-SCNC: 132 MMOL/L (ref 136–145)
SODIUM SERPL-SCNC: 134 MMOL/L (ref 136–145)
SODIUM SERPL-SCNC: 135 MMOL/L (ref 136–145)
SODIUM SERPL-SCNC: 138 MMOL/L (ref 136–145)
SODIUM SERPL-SCNC: 138 MMOL/L (ref 136–145)
WBC # BLD AUTO: 7.29 K/UL (ref 3.9–12.7)
WBC #/AREA STL HPF: NORMAL /[HPF]

## 2024-03-18 PROCEDURE — 27000207 HC ISOLATION

## 2024-03-18 PROCEDURE — 87449 NOS EACH ORGANISM AG IA: CPT | Performed by: STUDENT IN AN ORGANIZED HEALTH CARE EDUCATION/TRAINING PROGRAM

## 2024-03-18 PROCEDURE — 89055 LEUKOCYTE ASSESSMENT FECAL: CPT | Performed by: STUDENT IN AN ORGANIZED HEALTH CARE EDUCATION/TRAINING PROGRAM

## 2024-03-18 PROCEDURE — 87046 STOOL CULTR AEROBIC BACT EA: CPT | Performed by: STUDENT IN AN ORGANIZED HEALTH CARE EDUCATION/TRAINING PROGRAM

## 2024-03-18 PROCEDURE — 80048 BASIC METABOLIC PNL TOTAL CA: CPT

## 2024-03-18 PROCEDURE — 25000003 PHARM REV CODE 250

## 2024-03-18 PROCEDURE — 87329 GIARDIA AG IA: CPT | Performed by: STUDENT IN AN ORGANIZED HEALTH CARE EDUCATION/TRAINING PROGRAM

## 2024-03-18 PROCEDURE — 83735 ASSAY OF MAGNESIUM: CPT

## 2024-03-18 PROCEDURE — 82705 FATS/LIPIDS FECES QUAL: CPT | Performed by: STUDENT IN AN ORGANIZED HEALTH CARE EDUCATION/TRAINING PROGRAM

## 2024-03-18 PROCEDURE — 20600001 HC STEP DOWN PRIVATE ROOM

## 2024-03-18 PROCEDURE — 83993 ASSAY FOR CALPROTECTIN FECAL: CPT | Performed by: STUDENT IN AN ORGANIZED HEALTH CARE EDUCATION/TRAINING PROGRAM

## 2024-03-18 PROCEDURE — 85025 COMPLETE CBC W/AUTO DIFF WBC: CPT

## 2024-03-18 PROCEDURE — 87045 FECES CULTURE AEROBIC BACT: CPT | Performed by: STUDENT IN AN ORGANIZED HEALTH CARE EDUCATION/TRAINING PROGRAM

## 2024-03-18 PROCEDURE — 82653 EL-1 FECAL QUANTITATIVE: CPT | Performed by: STUDENT IN AN ORGANIZED HEALTH CARE EDUCATION/TRAINING PROGRAM

## 2024-03-18 PROCEDURE — 25000003 PHARM REV CODE 250: Performed by: STUDENT IN AN ORGANIZED HEALTH CARE EDUCATION/TRAINING PROGRAM

## 2024-03-18 PROCEDURE — 87425 ROTAVIRUS AG IA: CPT | Performed by: STUDENT IN AN ORGANIZED HEALTH CARE EDUCATION/TRAINING PROGRAM

## 2024-03-18 PROCEDURE — 82272 OCCULT BLD FECES 1-3 TESTS: CPT | Performed by: STUDENT IN AN ORGANIZED HEALTH CARE EDUCATION/TRAINING PROGRAM

## 2024-03-18 PROCEDURE — 87427 SHIGA-LIKE TOXIN AG IA: CPT | Performed by: STUDENT IN AN ORGANIZED HEALTH CARE EDUCATION/TRAINING PROGRAM

## 2024-03-18 PROCEDURE — 99232 SBSQ HOSP IP/OBS MODERATE 35: CPT | Mod: GC,,, | Performed by: INTERNAL MEDICINE

## 2024-03-18 PROCEDURE — 36415 COLL VENOUS BLD VENIPUNCTURE: CPT

## 2024-03-18 PROCEDURE — 63600175 PHARM REV CODE 636 W HCPCS

## 2024-03-18 PROCEDURE — 82103 ALPHA-1-ANTITRYPSIN TOTAL: CPT | Performed by: STUDENT IN AN ORGANIZED HEALTH CARE EDUCATION/TRAINING PROGRAM

## 2024-03-18 PROCEDURE — 87338 HPYLORI STOOL AG IA: CPT | Performed by: STUDENT IN AN ORGANIZED HEALTH CARE EDUCATION/TRAINING PROGRAM

## 2024-03-18 RX ORDER — LOPERAMIDE HYDROCHLORIDE 2 MG/1
2 CAPSULE ORAL 4 TIMES DAILY PRN
Status: DISCONTINUED | OUTPATIENT
Start: 2024-03-18 | End: 2024-03-20 | Stop reason: HOSPADM

## 2024-03-18 RX ORDER — INSULIN ASPART 100 [IU]/ML
4 INJECTION, SOLUTION INTRAVENOUS; SUBCUTANEOUS
Status: DISCONTINUED | OUTPATIENT
Start: 2024-03-19 | End: 2024-03-20

## 2024-03-18 RX ADMIN — ACETAMINOPHEN 650 MG: 325 TABLET ORAL at 09:03

## 2024-03-18 RX ADMIN — ACETAMINOPHEN 650 MG: 325 TABLET ORAL at 01:03

## 2024-03-18 RX ADMIN — ASPIRIN 81 MG CHEWABLE TABLET 81 MG: 81 TABLET CHEWABLE at 09:03

## 2024-03-18 RX ADMIN — LOSARTAN POTASSIUM 25 MG: 25 TABLET, FILM COATED ORAL at 09:03

## 2024-03-18 RX ADMIN — PIPERACILLIN SODIUM AND TAZOBACTAM SODIUM 4.5 G: 4; .5 INJECTION, POWDER, FOR SOLUTION INTRAVENOUS at 01:03

## 2024-03-18 RX ADMIN — PIPERACILLIN SODIUM AND TAZOBACTAM SODIUM 4.5 G: 4; .5 INJECTION, POWDER, FOR SOLUTION INTRAVENOUS at 09:03

## 2024-03-18 RX ADMIN — APIXABAN 5 MG: 5 TABLET, FILM COATED ORAL at 09:03

## 2024-03-18 RX ADMIN — SODIUM CHLORIDE: 9 INJECTION, SOLUTION INTRAVENOUS at 09:03

## 2024-03-18 RX ADMIN — INSULIN ASPART 3 UNITS: 100 INJECTION, SOLUTION INTRAVENOUS; SUBCUTANEOUS at 05:03

## 2024-03-18 RX ADMIN — INSULIN DETEMIR 6 UNITS: 100 INJECTION, SOLUTION SUBCUTANEOUS at 09:03

## 2024-03-18 RX ADMIN — LOPERAMIDE HYDROCHLORIDE 2 MG: 2 CAPSULE ORAL at 01:03

## 2024-03-18 RX ADMIN — LACOSAMIDE 100 MG: 50 TABLET, FILM COATED ORAL at 09:03

## 2024-03-18 RX ADMIN — LOPERAMIDE HYDROCHLORIDE 2 MG: 2 CAPSULE ORAL at 09:03

## 2024-03-18 RX ADMIN — INSULIN ASPART 3 UNITS: 100 INJECTION, SOLUTION INTRAVENOUS; SUBCUTANEOUS at 01:03

## 2024-03-18 RX ADMIN — TAMSULOSIN HYDROCHLORIDE 0.4 MG: 0.4 CAPSULE ORAL at 09:03

## 2024-03-18 RX ADMIN — AMIODARONE HYDROCHLORIDE 200 MG: 200 TABLET ORAL at 09:03

## 2024-03-18 RX ADMIN — ATORVASTATIN CALCIUM 40 MG: 40 TABLET, FILM COATED ORAL at 09:03

## 2024-03-18 RX ADMIN — APIXABAN 2.5 MG: 2.5 TABLET, FILM COATED ORAL at 09:03

## 2024-03-18 RX ADMIN — INSULIN ASPART 2 UNITS: 100 INJECTION, SOLUTION INTRAVENOUS; SUBCUTANEOUS at 05:03

## 2024-03-18 RX ADMIN — INSULIN ASPART 3 UNITS: 100 INJECTION, SOLUTION INTRAVENOUS; SUBCUTANEOUS at 09:03

## 2024-03-18 RX ADMIN — LOPERAMIDE HYDROCHLORIDE 2 MG: 2 CAPSULE ORAL at 05:03

## 2024-03-18 NOTE — PLAN OF CARE
03/18/24 1248   Post-Acute Status   Post-Acute Authorization Placement   Post-Acute Placement Status Set-up Complete/Auth obtained   Coverage MEDICARE - MEDICARE PART A & B -   Discharge Delays None known at this time   Discharge Plan   Discharge Plan A Return to nursing home     CM met with patient and spoke with daughter Ms Herrera via phone to discuss any changes in discharge planning.  Patients plan is to return to [facility] once medically stable.    No changes in DC plans. ALLIE: 3/20/24    CM sent updated  ALLIE and supportive documentation to [facility] Phone: [phone]    via [employer].      Komal Cuello RN  Case Management  [facility]  [phone]

## 2024-03-18 NOTE — PROGRESS NOTES
[facility] ([place] [address])  [facility] Medicine  Progress Note    Patient Name: Kamar Muñoz  MRN: [id number]  Patient Class: IP- Inpatient   Admission Date: 3/15/2024  Length of Stay: 3 days  Attending Physician: Candelaria Waite MD  Primary Care Provider: Basim Guerrero MD        Subjective:     Principal Problem:Hypovolemic shock        HPI:  Kamar Muñoz is an 78-year-old man with IDDM, CVA with residual left-sided deficits, bed-bound at baseline, HTN, nephrolithiasis, paroxysmal AFib on Eliquis, CAD s/p MI, who presents with hypotension, vomiting, and diarrhea from NH. Patient reports he has been having diarrhea about 5 times a day for the last several days, and one episode of emesis today, with some abdominal cramping. He also reports ongoing dizziness and lightheadedness. He is a somewhat poor historian but he denies any recent changes to his medications. On review of systems, he denies SOB, chest pain, blood in his stool or vomit, and reports no abdominal pain at present. Patient was recently hospitalized from 2/29 to 3/4 due to episode of hypoxemia and hypotension, thought to be due to symptomatic bradycardia. He was discharged with his beta blocker discontinued, and received IVF and antibiotics for UTI.     In the ED, patient tachycardic HR 100s, hypotensive 71/35 and received 2L of LR with BP only temporarily responsive, so pt was started on peripheral levophed. Initial labs notable for K 5.3, Bicarb 6, , BHB 6.1. Lactate 7.5. pH 7.121, CO2 24.9. Patient admitted to the MICU for DKA and shock requiring pressors.    Overview/Hospital Course:   anion gap closed x2.  Patient was transitioned to subcutaneous insulin by endocrinology team.  Patient was step-down to hospital medicine team.  Diet was advanced.  Blood cultures grew staph epidermidis.  Most likely contaminant.  Vancomycin discontinued.  Patient was complaining of diarrhea, C diff testing was negative.   Stool studies were ordered.    Interval history:  No overnight events.  Patient reports significant improvement in symptoms.  Patient was complaining of diarrhea, C diff testing was negative.  Stool studies were ordered.      Review of Systems   Constitutional:  Negative for chills and fever.   Eyes:  Negative for visual disturbance.   Respiratory:  Negative for cough and shortness of breath.    Cardiovascular:  Negative for chest pain.   Gastrointestinal:  Positive for diarrhea and vomiting. Negative for nausea.   Genitourinary:  Positive for difficulty urinating. Negative for dysuria.   Musculoskeletal:  Positive for back pain.   Skin:  Positive for wound.   Neurological:  Negative for headaches.     Objective:     Vital Signs (Most Recent):  Temp: 97.7 °F (36.5 °C) (03/18/24 1158)  Pulse: 68 (03/18/24 1600)  Resp: 20 (03/18/24 1600)  BP: (!) 152/79 (03/18/24 1600)  SpO2: 96 % (03/18/24 1600) Vital Signs (24h Range):  Temp:  [97.6 °F (36.4 °C)-98.3 °F (36.8 °C)] 97.7 °F (36.5 °C)  Pulse:  [64-81] 68  Resp:  [18-20] 20  SpO2:  [96 %-98 %] 96 %  BP: (140-152)/(61-79) 152/79   Weight: 75.8 kg (167 lb 1.7 oz)  Body mass index is 21.46 kg/m².      Intake/Output Summary (Last 24 hours) at 3/18/2024 1805  Last data filed at 3/18/2024 0453  Gross per 24 hour   Intake 1548.37 ml   Output 1150 ml   Net 398.37 ml            Physical Exam  Vitals reviewed.   Constitutional:       General: He is not in acute distress.     Appearance: Normal appearance. He is ill-appearing.   HENT:      Head: Normocephalic and atraumatic.      Nose: No congestion or rhinorrhea.      Mouth/Throat:      Mouth: Mucous membranes are dry.      Pharynx: Oropharynx is clear.   Cardiovascular:      Rate and Rhythm: Regular rhythm. Tachycardia present.      Pulses: Normal pulses.      Heart sounds: Normal heart sounds.   Pulmonary:      Effort: Pulmonary effort is normal. No respiratory distress.      Breath sounds: Normal breath sounds.   Abdominal:     "  General: Bowel sounds are normal.      Palpations: Abdomen is soft.      Tenderness: There is no abdominal tenderness.   Genitourinary:     Comments: Mccord with cloudy yellow output  Musculoskeletal:         General: No swelling or tenderness.      Cervical back: Full passive range of motion without pain and normal range of motion.   Skin:     General: Skin is warm and dry.      Comments: Minor sacral wound, no skin breakdown   Neurological:      General: No focal deficit present.      Mental Status: He is alert and oriented to person, place, and time. Mental status is at baseline.   Psychiatric:         Mood and Affect: Mood normal.         Behavior: Behavior normal.            Vents:     Lines/Drains/Airways       Drain  Duration             Male External Urinary Catheter 03/16/24 0730 Large 2 days              Peripheral Intravenous Line  Duration                  Peripheral IV - Single Lumen 03/15/24 1905 20 G Anterior;Left Upper Arm 2 days         Peripheral IV - Single Lumen 03/15/24 2028 18 G Anterior;Left Forearm 2 days                  Significant Labs:    CBC/Anemia Profile:  Recent Labs   Lab 03/17/24  0353 03/18/24  0512   WBC 10.92 7.29   HGB 9.9* 9.8*   HCT 32.0* 30.4*    207   MCV 84 84   RDW 17.0* 17.0*          Chemistries:  Recent Labs   Lab 03/17/24  0353 03/17/24  0806 03/18/24  0512 03/18/24  0808 03/18/24  1132 03/18/24  1603   *   < > 135* 138 134* 138   K 4.2   < > 3.9 3.9 3.9 3.9      < > 106 108 106 108   CO2 22*   < > 21* 22* 20* 23   BUN 21   < > 17 14 15 15   CREATININE 1.5*   < > 1.1 1.1 1.1 1.0   CALCIUM 7.9*   < > 7.8* 8.3* 8.0* 7.6*   MG 1.7  --  1.7  --   --   --     < > = values in this interval not displayed.         All pertinent labs within the past 24 hours have been reviewed.    Significant Imaging: I have reviewed all pertinent imaging results/findings within the past 24 hours.    Assessment/Plan:      * Hypovolemic shock  See "DKA" for specifics for " management. Patient presented with hypotension and MAP < 60 in the ED, despite 2L IVF. Started on peripheral levophed. Lactic elevated at 7 and pt acidemic. Likely due to dehydration in DKA and continued GI losses from diarrhea; though there may be an underlying infectious etiology as well. On exam, patient with cloudy, purulent appearing urine and had been recently hospitalized for suspected UTI though Ucx was negative.     Resolved         Candiduria    Patient does not currently have any lower urinary tract symptoms. Additionally it appears that the urine specimen was collected via nonsterile technique and or via a non catheterized urine sample.   No antibiotics needed    Positive blood culture  Patient with 1/4 positive blood cultures GPCs in clusters now identified as S epidermidis on rapid ID.  Patient is afebrile and without leukocytosis.   Vancomycin discontinued      Diarrhea  Patient presented with 5 days of diarrhea from nursing home with DKA, hypotension requiring pressor support. Diarrhea apparently persistent issue as previous hospitalization also had report of diarrhea. Likely contributing to dehydration and hypovolemic state.     - Daily CMP to assess for electrolyte imbalances.  Patient was complaining of diarrhea, C diff testing was negative.  Stool studies were ordered.         Dyslipidemia associated with type 2 diabetes mellitus  Continue home atorvastatin 40 mg q.d.    DKA (diabetic ketoacidosis)  78M with hx of T2DM who presents with hypotension, nausea/vomiting, and diarrhea. Pt denies any changes to his insulin and reports that he receives it regularly at his NH. However, he does not know his doses. Outside documentation suggests Levemir 30U and Aspart 4U with meals, though on our records, his home dose of levemir is 14U. Will go by weight based dosing  Initial labs with acidosis pH of 7.1, beta hydroxybutyrate of 6.1, lactate 7.5, and blood glucose of >700 consistent with DKA. Pt received  2L IVF initially in the ED with minimal improvement in BP so was started on peripheral levo. Cause of DKA may be an ongoing UTI; pt recently hospitalized for UTI and was discharged with PO antibiotics, however pt with cloudy and purulent appearing urine.     Recommendations:  - Continuous NS at 125 mL/hr. Once BGL <200, change IVF to D5 1/2 NS at 50 mL/hr  - Monitor for volume overload and pulmonary edema  - Insulin gtt per protocol, gtt @ 0.1U/kg/hr  - q1h glucose accuchecks while on insulin gtt     Resolved.    History of partial seizures  Documented history of seizures.     - Continue home Vimpat 100mg BID      Paroxysmal atrial fibrillation  On eliquis 5 BID, but given current reduced renal function and his age, meets criteria for reduced dose.     - Continue Eliquis 2.5 BID       History of embolic stroke  Debility     CVA with R frontal and L cerebellar infarcts on 1/12/2022  with residual LSW and physical debility as a result.     Plan:  - Continue ASA 81mg and Atorvastatin 80mg qd.  - Monitor for and prevent skin breakdown and pressure ulcers, wound care consult as needed  - Early mobility, OOB in chair at least 3 hours per day, repositioning/weight shifting every 20-30 minutes when sitting, turn patient every 2 hours, proper mattress/overlay and chair cushioning, pressure relief/heel protector boots  - DVT prophylaxis covered with Eliquis 2.5 BID (79 yo and Cr 2.1)      Coronary artery disease involving native coronary artery of native heart with unstable angina pectoris  History of STEMI in 2021 with RCA LAUREN; LAD 70% medically managed.      - Continue ASA 81, and high dose statin 40mg daily.       BRENDAN (acute kidney injury)  Admission sCR 2.1, previous Cr from 11d ago 1.2 (baseline appears to be .9-1.1).  Likely pre-renal from dehydration and volume losses from diarrhea. Patient appears dry on exam.     Plan:  - Patient received IVF as part of DKA protocol for volume resuscitation.  - If BRENDAN unimproved on  repeat labs, consider obtaining Urine Na, urea and urine protein/creatinine ratio, and RP U/S to evaluate for other causes of BRENDAN.  - Strict I&Os  - Avoid nephrotoxic agents such as NSAIDs, gadolinium and IV radiocontrast.  - Renally dose meds to current GFR.    Resolved    Type 2 diabetes mellitus with hyperglycemia, with long-term current use of insulin    See DKA    Essential hypertension  Chronic, controlled. Latest blood pressure and vitals reviewed-     Temp:  [97.6 °F (36.4 °C)-98.3 °F (36.8 °C)]   Pulse:  [62-73]   Resp:  [14-24]   BP: (131-185)/(67-88)   SpO2:  [90 %-97 %] .   Home meds for hypertension were reviewed and noted below.   Hypertension Medications               losartan (COZAAR) 25 MG tablet Take 25 mg by mouth once daily.    nitroGLYCERIN (NITROSTAT) 0.4 MG SL tablet Place 1 tablet (0.4 mg total) under the tongue every 5 (five) minutes as needed for Chest pain.            While in the hospital, will manage blood pressure as follows; Continue home antihypertensive regimen    Will utilize p.r.n. blood pressure medication only if patient's blood pressure greater than 180/110 and he develops symptoms such as worsening chest pain or shortness of breath.      VTE Risk Mitigation (From admission, onward)           Ordered     apixaban tablet 5 mg  2 times daily         03/18/24 1354                    Discharge Planning   ALLIE: 3/19/2024     Code Status: DNR   Is the patient medically ready for discharge?: No    Reason for patient still in hospital (select all that apply): Patient trending condition, Laboratory test, Treatment, and Consult recommendations  Discharge Plan A: Return to nursing home   Discharge Delays: None known at this time              Candelaria Waite MD  Department of Hospital Medicine   [facility] ([place])

## 2024-03-18 NOTE — NURSING
Pt AAO, VSS, NSR on tele, O2 98% on RA.  Pt had 6 episodes of diarrhea, Dr. Waite made aware, stool studies ordered and Immodium. Pt complained of mild back pain, tylenol given per order.    Bed low and locked, call light and belongings in reach.

## 2024-03-18 NOTE — ASSESSMENT & PLAN NOTE
Patient presented with 5 days of diarrhea from nursing home with DKA, hypotension requiring pressor support. Diarrhea apparently persistent issue as previous hospitalization also had report of diarrhea. Likely contributing to dehydration and hypovolemic state.     - Daily CMP to assess for electrolyte imbalances.  Patient was complaining of diarrhea, C diff testing was negative.  Stool studies were ordered.

## 2024-03-18 NOTE — SUBJECTIVE & OBJECTIVE
"Interval HPI:   Overnight events:  Continues to eat 50-75% of each tray he estimates.  No dietary indiscretion to explain the BG into the 200-300's over past 24 hours.  Downtrend in BG overnight.  No new concerns except for some diarrhea that he has noticed.        Eatin%  Nausea: No  Hypoglycemia and intervention: No  Fever: No  TPN and/or TF: No  If yes, type of TF/TPN and rate: NA    BP (!) 150/73 (BP Location: Right arm, Patient Position: Lying)   Pulse 66   Temp 97.7 °F (36.5 °C) (Oral)   Resp 18   Wt 75.8 kg (167 lb 1.7 oz)   SpO2 98%   BMI 21.46 kg/m²     Labs Reviewed and Include    Recent Labs   Lab 24  1132   *   CALCIUM 8.0*   *   K 3.9   CO2 20*      BUN 15   CREATININE 1.1     Lab Results   Component Value Date    WBC 7.29 2024    HGB 9.8 (L) 2024    HCT 30.4 (L) 2024    MCV 84 2024     2024     Recent Labs   Lab 03/15/24  1907   TSH 0.412     Lab Results   Component Value Date    HGBA1C 8.7 (H) 2024       Nutritional status:   Body mass index is 21.46 kg/m².  Lab Results   Component Value Date    ALBUMIN 2.6 (L) 03/15/2024    ALBUMIN 2.6 (L) 2024    ALBUMIN 2.7 (L) 2024     No results found for: "PREALBUMIN"    Estimated Creatinine Clearance: 57.4 mL/min (based on SCr of 1.1 mg/dL).    Accu-Checks  Recent Labs     24  1629 24  1936 24  0057 24  0319 24  0847 24  1135 24  1649 24  1951 24  0725 24  1156   POCTGLUCOSE 168* 185* 284* 247* 139* 124* 265* 244* 117* 166*       Current Medications and/or Treatments Impacting Glycemic Control  Immunotherapy:    Immunosuppressants       None          Steroids:   Hormones (From admission, onward)      Start     Stop Route Frequency Ordered    24 0140  melatonin tablet 6 mg         -- Oral Nightly PRN 24 0040          Pressors:    Autonomic Drugs (From admission, onward)      None      "     Hyperglycemia/Diabetes Medications:   Antihyperglycemics (From admission, onward)      Start     Stop Route Frequency Ordered    03/18/24 0945  insulin detemir U-100 (Levemir) pen 6 Units         -- SubQ 2 times daily 03/18/24 0933    03/17/24 1215  insulin aspart U-100 pen 3 Units         -- SubQ 3 times daily with meals 03/17/24 1109    03/17/24 1208  insulin aspart U-100 pen 0-5 Units         -- SubQ Before meals & nightly PRN 03/17/24 1109

## 2024-03-18 NOTE — PROGRESS NOTES
"[facility] ([facility]  Endocrinology  Progress Note    Admit Date: 3/15/2024     Kamar Muñoz is an 78-year-old man with ketosis-prone type 2 DM, CVA with residual left-sided deficits, bed-bound at baseline, HTN, nephrolithiasis, paroxysmal AFib on Eliquis, CAD s/p MI, who presents with hypotension, vomiting, and diarrhea from NH. Patient was somnolent at time of evaluation so history was obtained from chart review.    "Patient reports he has been having diarrhea about 5 times a day for the last several days, and one episode of emesis today, with some abdominal cramping. He also reports ongoing dizziness and lightheadedness. He is a somewhat poor historian but he denies any recent changes to his medications. On review of systems, he denies SOB, chest pain, blood in his stool or vomit, and reports no abdominal pain at present. Patient was recently hospitalized from 2/29 to 3/4 due to episode of hypoxemia and hypotension, thought to be due to symptomatic bradycardia. He was discharged with his beta blocker discontinued, and received IVF and antibiotics for UTI.     In the ED, patient tachycardic HR 100s, hypotensive 71/35 and received 2L of LR with BP only temporarily responsive, so pt was started on peripheral levophed. Initial labs notable for K 5.3, Bicarb 6, , BHB 6.1. Lactate 7.5. pH 7.121, CO2 24.9. Patient admitted to the MICU for DKA and shock requiring pressors."    Last admission Feb/2024 final endocrine recs from inpatient team for insulin regimen at [place] were as follows:  Recommend resuming home regimen Levemir 6 units BID and Novolog 5 units plus SSI LDC (150/50) and Januvia 50mg QD.      Diabetes Complications include:     Hyperglycemia, Hypoglycemia , and Diabetic chronic kidney disease          Complicating diabetes co morbidities:   History of MI, History of CVA, and CKD    Hemoglobin A1C   Date Value Ref Range Status   02/29/2024 8.7 (H) 4.0 - 5.6 % Final     Comment:    "  ADA Screening Guidelines:  5.7-6.4%  Consistent with prediabetes  >or=6.5%  Consistent with diabetes    High levels of fetal hemoglobin interfere with the HbA1C  assay. Heterozygous hemoglobin variants (HbS, HgC, etc)do  not significantly interfere with this assay.   However, presence of multiple variants may affect accuracy.     2023 8.9 (H) 4.0 - 5.6 % Final     Comment:     ADA Screening Guidelines:  5.7-6.4%  Consistent with prediabetes  >or=6.5%  Consistent with diabetes    High levels of fetal hemoglobin interfere with the HbA1C  assay. Heterozygous hemoglobin variants (HbS, HgC, etc)do  not significantly interfere with this assay.   However, presence of multiple variants may affect accuracy.     10/05/2022 10.6 (H) 4.0 - 5.6 % Final     Comment:     ADA Screening Guidelines:  5.7-6.4%  Consistent with prediabetes  >or=6.5%  Consistent with diabetes    High levels of fetal hemoglobin interfere with the HbA1C  assay. Heterozygous hemoglobin variants (HbS, HgC, etc)do  not significantly interfere with this assay.   However, presence of multiple variants may affect accuracy.           Interval HPI:   Overnight events:  Continues to eat 50-75% of each tray he estimates.  No dietary indiscretion to explain the BG into the 200-300's over past 24 hours.  Downtrend in BG overnight.  No new concerns except for some diarrhea that he has noticed.        Eatin%  Nausea: No  Hypoglycemia and intervention: No  Fever: No  TPN and/or TF: No  If yes, type of TF/TPN and rate: NA    BP (!) 150/73 (BP Location: Right arm, Patient Position: Lying)   Pulse 66   Temp 97.7 °F (36.5 °C) (Oral)   Resp 18   Wt 75.8 kg (167 lb 1.7 oz)   SpO2 98%   BMI 21.46 kg/m²     Labs Reviewed and Include    Recent Labs   Lab 24  1132   *   CALCIUM 8.0*   *   K 3.9   CO2 20*      BUN 15   CREATININE 1.1     Lab Results   Component Value Date    WBC 7.29 2024    HGB 9.8 (L) 2024    HCT 30.4 (L)  "03/18/2024    MCV 84 03/18/2024     03/18/2024     Recent Labs   Lab 03/15/24  1907   TSH 0.412     Lab Results   Component Value Date    HGBA1C 8.7 (H) 02/29/2024       Nutritional status:   Body mass index is 21.46 kg/m².  Lab Results   Component Value Date    ALBUMIN 2.6 (L) 03/15/2024    ALBUMIN 2.6 (L) 03/04/2024    ALBUMIN 2.7 (L) 03/03/2024     No results found for: "PREALBUMIN"    Estimated Creatinine Clearance: 57.4 mL/min (based on SCr of 1.1 mg/dL).    Accu-Checks  Recent Labs     03/16/24  1629 03/16/24  1936 03/17/24  0057 03/17/24  0319 03/17/24  0847 03/17/24  1135 03/17/24  1649 03/17/24  1951 03/18/24  0725 03/18/24  1156   POCTGLUCOSE 168* 185* 284* 247* 139* 124* 265* 244* 117* 166*       Current Medications and/or Treatments Impacting Glycemic Control  Immunotherapy:    Immunosuppressants       None          Steroids:   Hormones (From admission, onward)      Start     Stop Route Frequency Ordered    03/17/24 0140  melatonin tablet 6 mg         -- Oral Nightly PRN 03/17/24 0040          Pressors:    Autonomic Drugs (From admission, onward)      None          Hyperglycemia/Diabetes Medications:   Antihyperglycemics (From admission, onward)      Start     Stop Route Frequency Ordered    03/18/24 0945  insulin detemir U-100 (Levemir) pen 6 Units         -- SubQ 2 times daily 03/18/24 0933    03/17/24 1215  insulin aspart U-100 pen 3 Units         -- SubQ 3 times daily with meals 03/17/24 1109    03/17/24 1208  insulin aspart U-100 pen 0-5 Units         -- SubQ Before meals & nightly PRN 03/17/24 1109            ASSESSMENT and PLAN    Renal/  BRENDAN (acute kidney injury)  Creatinine at admission 1.9 from baseline around 1.2.  Insulin could stay in his system for longer since it is renally cleared, we will be conservative with insulin adjustments.  Now improved on latest labs.       Endocrine  DKA (diabetic ketoacidosis)  Now resolved, UTI implemented.        Type 2 diabetes mellitus with " hyperglycemia, with long-term current use of insulin  Key History and Diagnostic Findings  Suspected Etiology for DKA: UTI  Lab Results   Component Value Date    BHYDRXBUT 6.1 (H) 03/15/2024    BHYDRXBUT 5.3 (H) 03/23/2023    CO2 20 (L) 03/18/2024    CO2 22 (L) 03/18/2024    CO2 21 (L) 03/18/2024    CO2 19 (L) 03/17/2024    ANIONGAP 8 03/18/2024    ANIONGAP 8 03/18/2024    ANIONGAP 8 03/18/2024    ANIONGAP 11 03/17/2024    CREATININE 1.1 03/18/2024    CREATININE 1.1 03/18/2024      Due to BG down trending overnight the basal insulin dose will be adjusted as below.      Plan  - Decrease Levemir to 6 units BID  - Continue Aspart 3 units TIDAC, if eating + LDSSI    - POCT glucose check ACHS   - Will follow for home insulin needs  - Desires visit with us in the office after discharge, which we can set up in coming weeks      Will monitor response to insulin doses but for now discharge recommendations will be similar to current doses of insulin. Please notify endocrine on date of discharge so final recs can be given.      Volodymyr Ayers, DO  Endocrinology  [facility] ([place]14)

## 2024-03-18 NOTE — ASSESSMENT & PLAN NOTE
Creatinine at admission 1.9 from baseline around 1.2.  Insulin could stay in his system for longer since it is renally cleared, we will be conservative with insulin adjustments.  Now improved on latest labs.

## 2024-03-18 NOTE — SUBJECTIVE & OBJECTIVE
Interval history:  No overnight events.  Patient reports significant improvement in symptoms.  Patient was complaining of diarrhea, C diff testing was negative.  Stool studies were ordered.      Review of Systems   Constitutional:  Negative for chills and fever.   Eyes:  Negative for visual disturbance.   Respiratory:  Negative for cough and shortness of breath.    Cardiovascular:  Negative for chest pain.   Gastrointestinal:  Positive for diarrhea and vomiting. Negative for nausea.   Genitourinary:  Positive for difficulty urinating. Negative for dysuria.   Musculoskeletal:  Positive for back pain.   Skin:  Positive for wound.   Neurological:  Negative for headaches.     Objective:     Vital Signs (Most Recent):  Temp: 97.7 °F (36.5 °C) (03/18/24 1158)  Pulse: 68 (03/18/24 1600)  Resp: 20 (03/18/24 1600)  BP: (!) 152/79 (03/18/24 1600)  SpO2: 96 % (03/18/24 1600) Vital Signs (24h Range):  Temp:  [97.6 °F (36.4 °C)-98.3 °F (36.8 °C)] 97.7 °F (36.5 °C)  Pulse:  [64-81] 68  Resp:  [18-20] 20  SpO2:  [96 %-98 %] 96 %  BP: (140-152)/(61-79) 152/79   Weight: 75.8 kg (167 lb 1.7 oz)  Body mass index is 21.46 kg/m².      Intake/Output Summary (Last 24 hours) at 3/18/2024 1805  Last data filed at 3/18/2024 0453  Gross per 24 hour   Intake 1548.37 ml   Output 1150 ml   Net 398.37 ml            Physical Exam  Vitals reviewed.   Constitutional:       General: He is not in acute distress.     Appearance: Normal appearance. He is ill-appearing.   HENT:      Head: Normocephalic and atraumatic.      Nose: No congestion or rhinorrhea.      Mouth/Throat:      Mouth: Mucous membranes are dry.      Pharynx: Oropharynx is clear.   Cardiovascular:      Rate and Rhythm: Regular rhythm. Tachycardia present.      Pulses: Normal pulses.      Heart sounds: Normal heart sounds.   Pulmonary:      Effort: Pulmonary effort is normal. No respiratory distress.      Breath sounds: Normal breath sounds.   Abdominal:      General: Bowel sounds are  normal.      Palpations: Abdomen is soft.      Tenderness: There is no abdominal tenderness.   Genitourinary:     Comments: Mccord with cloudy yellow output  Musculoskeletal:         General: No swelling or tenderness.      Cervical back: Full passive range of motion without pain and normal range of motion.   Skin:     General: Skin is warm and dry.      Comments: Minor sacral wound, no skin breakdown   Neurological:      General: No focal deficit present.      Mental Status: He is alert and oriented to person, place, and time. Mental status is at baseline.   Psychiatric:         Mood and Affect: Mood normal.         Behavior: Behavior normal.            Vents:     Lines/Drains/Airways       Drain  Duration             Male External Urinary Catheter 03/16/24 0730 Large 2 days              Peripheral Intravenous Line  Duration                  Peripheral IV - Single Lumen 03/15/24 1905 20 G Anterior;Left Upper Arm 2 days         Peripheral IV - Single Lumen 03/15/24 2028 18 G Anterior;Left Forearm 2 days                  Significant Labs:    CBC/Anemia Profile:  Recent Labs   Lab 03/17/24  0353 03/18/24  0512   WBC 10.92 7.29   HGB 9.9* 9.8*   HCT 32.0* 30.4*    207   MCV 84 84   RDW 17.0* 17.0*          Chemistries:  Recent Labs   Lab 03/17/24  0353 03/17/24  0806 03/18/24  0512 03/18/24  0808 03/18/24  1132 03/18/24  1603   *   < > 135* 138 134* 138   K 4.2   < > 3.9 3.9 3.9 3.9      < > 106 108 106 108   CO2 22*   < > 21* 22* 20* 23   BUN 21   < > 17 14 15 15   CREATININE 1.5*   < > 1.1 1.1 1.1 1.0   CALCIUM 7.9*   < > 7.8* 8.3* 8.0* 7.6*   MG 1.7  --  1.7  --   --   --     < > = values in this interval not displayed.         All pertinent labs within the past 24 hours have been reviewed.    Significant Imaging: I have reviewed all pertinent imaging results/findings within the past 24 hours.

## 2024-03-18 NOTE — ASSESSMENT & PLAN NOTE
Key History and Diagnostic Findings  Suspected Etiology for DKA: UTI  Lab Results   Component Value Date    BHYDRXBUT 6.1 (H) 03/15/2024    BHYDRXBUT 5.3 (H) 03/23/2023    CO2 20 (L) 03/18/2024    CO2 22 (L) 03/18/2024    CO2 21 (L) 03/18/2024    CO2 19 (L) 03/17/2024    ANIONGAP 8 03/18/2024    ANIONGAP 8 03/18/2024    ANIONGAP 8 03/18/2024    ANIONGAP 11 03/17/2024    CREATININE 1.1 03/18/2024    CREATININE 1.1 03/18/2024      Due to BG down trending overnight the basal insulin dose will be adjusted as below.      Plan  - Decrease Levemir to 6 units BID  - Continue Aspart 3 units TIDAC, if eating + LDSSI    - POCT glucose check ACHS   - Will follow for home insulin needs  - Desires visit with us in the office after discharge, which we can set up in coming weeks

## 2024-03-18 NOTE — PLAN OF CARE
Problem: Adjustment to Illness (Delirium)  Goal: Optimal Coping  Outcome: Ongoing, Progressing     Problem: Altered Behavior (Delirium)  Goal: Improved Behavioral Control  Outcome: Ongoing, Progressing     Problem: Attention and Thought Clarity Impairment (Delirium)  Goal: Improved Attention and Thought Clarity  Outcome: Ongoing, Progressing     Problem: Sleep Disturbance (Delirium)  Goal: Improved Sleep  Outcome: Ongoing, Progressing     Problem: Adult Inpatient Plan of Care  Goal: Plan of Care Review  Outcome: Ongoing, Progressing  Goal: Patient-Specific Goal (Individualized)  Outcome: Ongoing, Progressing  Goal: Absence of Hospital-Acquired Illness or Injury  Outcome: Ongoing, Progressing  Goal: Optimal Comfort and Wellbeing  Outcome: Ongoing, Progressing  Goal: Readiness for Transition of Care  Outcome: Ongoing, Progressing     Problem: Diabetic Ketoacidosis  Goal: Fluid and Electrolyte Balance with Absence of Ketosis  Outcome: Ongoing, Progressing     Problem: Diabetes Comorbidity  Goal: Blood Glucose Level Within Targeted Range  Outcome: Ongoing, Progressing     Problem: Fluid and Electrolyte Imbalance (Acute Kidney Injury/Impairment)  Goal: Fluid and Electrolyte Balance  Outcome: Ongoing, Progressing     Problem: Oral Intake Inadequate (Acute Kidney Injury/Impairment)  Goal: Optimal Nutrition Intake  Outcome: Ongoing, Progressing     Problem: Renal Function Impairment (Acute Kidney Injury/Impairment)  Goal: Effective Renal Function  Outcome: Ongoing, Progressing     Problem: Impaired Wound Healing  Goal: Optimal Wound Healing  Outcome: Ongoing, Progressing     Problem: Fall Injury Risk  Goal: Absence of Fall and Fall-Related Injury  Outcome: Ongoing, Progressing     Problem: Infection  Goal: Absence of Infection Signs and Symptoms  Outcome: Ongoing, Progressing

## 2024-03-18 NOTE — NURSING
Patient A&Ox2-3 at time, breathing even and unlabored, had multiple  liquid bowel movement, C-Diff negative.  Patient safety maintain at all time, ongoing monitoring, and care,   Vitals:    03/17/24 2305 03/18/24 0005 03/18/24 0328 03/18/24 0400   BP:  (!) 140/69  (!) 150/73   BP Location:  Right arm  Right arm   Patient Position:  Lying  Lying   Pulse: 81 76 64 67   Resp:  20  18   Temp:  98.3 °F (36.8 °C)  98 °F (36.7 °C)   TempSrc:  Oral  Oral   SpO2:  96%  98%   Weight:

## 2024-03-19 LAB
ANION GAP SERPL CALC-SCNC: 5 MMOL/L (ref 8–16)
BACTERIA BLD CULT: ABNORMAL
BASOPHILS # BLD AUTO: 0.03 K/UL (ref 0–0.2)
BASOPHILS NFR BLD: 0.6 % (ref 0–1.9)
BUN SERPL-MCNC: 13 MG/DL (ref 8–23)
CALCIUM SERPL-MCNC: 7.6 MG/DL (ref 8.7–10.5)
CHLORIDE SERPL-SCNC: 109 MMOL/L (ref 95–110)
CO2 SERPL-SCNC: 21 MMOL/L (ref 23–29)
CREAT SERPL-MCNC: 0.8 MG/DL (ref 0.5–1.4)
CRYPTOSP AG STL QL IA: NEGATIVE
DIFFERENTIAL METHOD BLD: ABNORMAL
E COLI SXT1 STL QL IA: NEGATIVE
E COLI SXT2 STL QL IA: NEGATIVE
EOSINOPHIL # BLD AUTO: 0.2 K/UL (ref 0–0.5)
EOSINOPHIL NFR BLD: 3.8 % (ref 0–8)
ERYTHROCYTE [DISTWIDTH] IN BLOOD BY AUTOMATED COUNT: 16.9 % (ref 11.5–14.5)
EST. GFR  (NO RACE VARIABLE): >60 ML/MIN/1.73 M^2
G LAMBLIA AG STL QL IA: NEGATIVE
GLUCOSE SERPL-MCNC: 121 MG/DL (ref 70–110)
HCT VFR BLD AUTO: 29.8 % (ref 40–54)
HGB BLD-MCNC: 9.6 G/DL (ref 14–18)
IMM GRANULOCYTES # BLD AUTO: 0.01 K/UL (ref 0–0.04)
IMM GRANULOCYTES NFR BLD AUTO: 0.2 % (ref 0–0.5)
LYMPHOCYTES # BLD AUTO: 1.1 K/UL (ref 1–4.8)
LYMPHOCYTES NFR BLD: 23 % (ref 18–48)
MAGNESIUM SERPL-MCNC: 1.6 MG/DL (ref 1.6–2.6)
MCH RBC QN AUTO: 26.7 PG (ref 27–31)
MCHC RBC AUTO-ENTMCNC: 32.2 G/DL (ref 32–36)
MCV RBC AUTO: 83 FL (ref 82–98)
MONOCYTES # BLD AUTO: 0.4 K/UL (ref 0.3–1)
MONOCYTES NFR BLD: 9.1 % (ref 4–15)
NEUTROPHILS # BLD AUTO: 3 K/UL (ref 1.8–7.7)
NEUTROPHILS NFR BLD: 63.3 % (ref 38–73)
NRBC BLD-RTO: 0 /100 WBC
OB PNL STL: POSITIVE
PLATELET # BLD AUTO: 196 K/UL (ref 150–450)
PMV BLD AUTO: 10 FL (ref 9.2–12.9)
POCT GLUCOSE: 111 MG/DL (ref 70–110)
POCT GLUCOSE: 139 MG/DL (ref 70–110)
POCT GLUCOSE: 170 MG/DL (ref 70–110)
POTASSIUM SERPL-SCNC: 3.6 MMOL/L (ref 3.5–5.1)
RBC # BLD AUTO: 3.59 M/UL (ref 4.6–6.2)
RV AG STL QL IA.RAPID: NEGATIVE
SODIUM SERPL-SCNC: 135 MMOL/L (ref 136–145)
WBC # BLD AUTO: 4.73 K/UL (ref 3.9–12.7)

## 2024-03-19 PROCEDURE — 20600001 HC STEP DOWN PRIVATE ROOM

## 2024-03-19 PROCEDURE — 63600175 PHARM REV CODE 636 W HCPCS: Performed by: STUDENT IN AN ORGANIZED HEALTH CARE EDUCATION/TRAINING PROGRAM

## 2024-03-19 PROCEDURE — 25000003 PHARM REV CODE 250: Performed by: STUDENT IN AN ORGANIZED HEALTH CARE EDUCATION/TRAINING PROGRAM

## 2024-03-19 PROCEDURE — 83735 ASSAY OF MAGNESIUM: CPT

## 2024-03-19 PROCEDURE — 63600175 PHARM REV CODE 636 W HCPCS

## 2024-03-19 PROCEDURE — 25000003 PHARM REV CODE 250

## 2024-03-19 PROCEDURE — 99232 SBSQ HOSP IP/OBS MODERATE 35: CPT | Mod: GC,,, | Performed by: INTERNAL MEDICINE

## 2024-03-19 PROCEDURE — 80048 BASIC METABOLIC PNL TOTAL CA: CPT

## 2024-03-19 PROCEDURE — 85025 COMPLETE CBC W/AUTO DIFF WBC: CPT

## 2024-03-19 RX ORDER — MAGNESIUM SULFATE HEPTAHYDRATE 40 MG/ML
2 INJECTION, SOLUTION INTRAVENOUS ONCE
Status: COMPLETED | OUTPATIENT
Start: 2024-03-19 | End: 2024-03-20

## 2024-03-19 RX ADMIN — ONDANSETRON 4 MG: 2 INJECTION INTRAMUSCULAR; INTRAVENOUS at 10:03

## 2024-03-19 RX ADMIN — TAMSULOSIN HYDROCHLORIDE 0.4 MG: 0.4 CAPSULE ORAL at 08:03

## 2024-03-19 RX ADMIN — INSULIN DETEMIR 6 UNITS: 100 INJECTION, SOLUTION SUBCUTANEOUS at 08:03

## 2024-03-19 RX ADMIN — APIXABAN 5 MG: 5 TABLET, FILM COATED ORAL at 08:03

## 2024-03-19 RX ADMIN — LACOSAMIDE 100 MG: 50 TABLET, FILM COATED ORAL at 08:03

## 2024-03-19 RX ADMIN — INSULIN ASPART 4 UNITS: 100 INJECTION, SOLUTION INTRAVENOUS; SUBCUTANEOUS at 02:03

## 2024-03-19 RX ADMIN — LOSARTAN POTASSIUM 25 MG: 25 TABLET, FILM COATED ORAL at 08:03

## 2024-03-19 RX ADMIN — INSULIN ASPART 4 UNITS: 100 INJECTION, SOLUTION INTRAVENOUS; SUBCUTANEOUS at 07:03

## 2024-03-19 RX ADMIN — INSULIN ASPART 4 UNITS: 100 INJECTION, SOLUTION INTRAVENOUS; SUBCUTANEOUS at 08:03

## 2024-03-19 RX ADMIN — ASPIRIN 81 MG CHEWABLE TABLET 81 MG: 81 TABLET CHEWABLE at 08:03

## 2024-03-19 RX ADMIN — MAGNESIUM SULFATE 2 G: 2 INJECTION INTRAVENOUS at 10:03

## 2024-03-19 RX ADMIN — INSULIN DETEMIR 6 UNITS: 100 INJECTION, SOLUTION SUBCUTANEOUS at 10:03

## 2024-03-19 RX ADMIN — LOPERAMIDE HYDROCHLORIDE 2 MG: 2 CAPSULE ORAL at 08:03

## 2024-03-19 RX ADMIN — AMIODARONE HYDROCHLORIDE 200 MG: 200 TABLET ORAL at 08:03

## 2024-03-19 RX ADMIN — LACOSAMIDE 100 MG: 50 TABLET, FILM COATED ORAL at 10:03

## 2024-03-19 RX ADMIN — ATORVASTATIN CALCIUM 40 MG: 40 TABLET, FILM COATED ORAL at 08:03

## 2024-03-19 RX ADMIN — APIXABAN 5 MG: 5 TABLET, FILM COATED ORAL at 10:03

## 2024-03-19 NOTE — ASSESSMENT & PLAN NOTE
Key History and Diagnostic Findings  Suspected Etiology for DKA: UTI  Lab Results   Component Value Date    BHYDRXBUT 6.1 (H) 03/15/2024    BHYDRXBUT 5.3 (H) 03/23/2023    CO2 21 (L) 03/19/2024    CO2 23 03/18/2024    CO2 20 (L) 03/18/2024    CO2 22 (L) 03/18/2024    ANIONGAP 5 (L) 03/19/2024    ANIONGAP 7 (L) 03/18/2024    ANIONGAP 8 03/18/2024    ANIONGAP 8 03/18/2024    CREATININE 0.8 03/19/2024    CREATININE 1.0 03/18/2024      Due to BG down trending overnight the basal insulin dose will be adjusted as below.      Plan  - Continue Levemir to 6 units BID  - Increase Aspart to 4 units TIDAC, if eating + LDSSI    - POCT glucose check ACHS   - Will follow for home insulin needs  - Desires visit with us in the office after discharge, which we can set up in coming weeks

## 2024-03-19 NOTE — SUBJECTIVE & OBJECTIVE
"Interval HPI:   Overnight events:  BG with some better control post requiring consistent sliding scale with meals.  Adjustments today for prandial insulin.  No new issues overnight.  He states his diarrhea has showed some mild improvement.      Eatin%  Nausea: No  Hypoglycemia and intervention: No  Fever: No  TPN and/or TF: No  If yes, type of TF/TPN and rate: NA    BP (!) 157/76   Pulse 65   Temp 97.7 °F (36.5 °C) (Oral)   Resp 20   Ht 6' 2" (1.88 m)   Wt 75.8 kg (167 lb 1.7 oz)   SpO2 96%   BMI 21.46 kg/m²     Labs Reviewed and Include    Recent Labs   Lab 24  0425   *   CALCIUM 7.6*   *   K 3.6   CO2 21*      BUN 13   CREATININE 0.8     Lab Results   Component Value Date    WBC 4.73 2024    HGB 9.6 (L) 2024    HCT 29.8 (L) 2024    MCV 83 2024     2024     Recent Labs   Lab 03/15/24  1907   TSH 0.412     Lab Results   Component Value Date    HGBA1C 8.7 (H) 2024       Nutritional status:   Body mass index is 21.46 kg/m².  Lab Results   Component Value Date    ALBUMIN 2.6 (L) 03/15/2024    ALBUMIN 2.6 (L) 2024    ALBUMIN 2.7 (L) 2024     No results found for: "PREALBUMIN"    Estimated Creatinine Clearance: 79 mL/min (based on SCr of 0.8 mg/dL).    Accu-Checks  Recent Labs     24  0319 24  0847 24  1135 24  1649 24  1951 24  0725 24  1156 24  1556 24  2105 24  0755   POCTGLUCOSE 247* 139* 124* 265* 244* 117* 166* 215* 213* 111*       Current Medications and/or Treatments Impacting Glycemic Control  Immunotherapy:    Immunosuppressants       None          Steroids:   Hormones (From admission, onward)      Start     Stop Route Frequency Ordered    24 0140  melatonin tablet 6 mg         -- Oral Nightly PRN 24 0040          Pressors:    Autonomic Drugs (From admission, onward)      None          Hyperglycemia/Diabetes Medications:   Antihyperglycemics (From " admission, onward)      Start     Stop Route Frequency Ordered    03/19/24 0715  insulin aspart U-100 pen 4 Units         -- SubQ 3 times daily with meals 03/18/24 2135    03/18/24 0945  insulin detemir U-100 (Levemir) pen 6 Units         -- SubQ 2 times daily 03/18/24 0933    03/17/24 1208  insulin aspart U-100 pen 0-5 Units         -- SubQ Before meals & nightly PRN 03/17/24 1102

## 2024-03-19 NOTE — NURSING
Pt AAO, VSS, NSR on tele, O2 99% on RA. Pt had 5 episodes of diarrhea, imodium does not seem to be helping. Pt was not able to sleep throughout the night. Call light within reach.

## 2024-03-19 NOTE — PROGRESS NOTES
"[facility] ([facility]-)  Endocrinology  Progress Note    Admit Date: 3/15/2024     Kamar Muñoz is an 78-year-old man with ketosis-prone type 2 DM, CVA with residual left-sided deficits, bed-bound at baseline, HTN, nephrolithiasis, paroxysmal AFib on Eliquis, CAD s/p MI, who presents with hypotension, vomiting, and diarrhea from NH. Patient was somnolent at time of evaluation so history was obtained from chart review.    "Patient reports he has been having diarrhea about 5 times a day for the last several days, and one episode of emesis today, with some abdominal cramping. He also reports ongoing dizziness and lightheadedness. He is a somewhat poor historian but he denies any recent changes to his medications. On review of systems, he denies SOB, chest pain, blood in his stool or vomit, and reports no abdominal pain at present. Patient was recently hospitalized from 2/29 to 3/4 due to episode of hypoxemia and hypotension, thought to be due to symptomatic bradycardia. He was discharged with his beta blocker discontinued, and received IVF and antibiotics for UTI.     In the ED, patient tachycardic HR 100s, hypotensive 71/35 and received 2L of LR with BP only temporarily responsive, so pt was started on peripheral levophed. Initial labs notable for K 5.3, Bicarb 6, , BHB 6.1. Lactate 7.5. pH 7.121, CO2 24.9. Patient admitted to the MICU for DKA and shock requiring pressors."    Last admission Feb/2024 final endocrine recs from inpatient team for insulin regimen at [place] were as follows:  Recommend resuming home regimen Levemir 6 units BID and Novolog 5 units plus SSI LDC (150/50) and Januvia 50mg QD.      Diabetes Complications include:     Hyperglycemia, Hypoglycemia , and Diabetic chronic kidney disease          Complicating diabetes co morbidities:   History of MI, History of CVA, and CKD    Hemoglobin A1C   Date Value Ref Range Status   02/29/2024 8.7 (H) 4.0 - 5.6 % Final     Comment:    " " ADA Screening Guidelines:  5.7-6.4%  Consistent with prediabetes  >or=6.5%  Consistent with diabetes    High levels of fetal hemoglobin interfere with the HbA1C  assay. Heterozygous hemoglobin variants (HbS, HgC, etc)do  not significantly interfere with this assay.   However, presence of multiple variants may affect accuracy.     2023 8.9 (H) 4.0 - 5.6 % Final     Comment:     ADA Screening Guidelines:  5.7-6.4%  Consistent with prediabetes  >or=6.5%  Consistent with diabetes    High levels of fetal hemoglobin interfere with the HbA1C  assay. Heterozygous hemoglobin variants (HbS, HgC, etc)do  not significantly interfere with this assay.   However, presence of multiple variants may affect accuracy.     10/05/2022 10.6 (H) 4.0 - 5.6 % Final     Comment:     ADA Screening Guidelines:  5.7-6.4%  Consistent with prediabetes  >or=6.5%  Consistent with diabetes    High levels of fetal hemoglobin interfere with the HbA1C  assay. Heterozygous hemoglobin variants (HbS, HgC, etc)do  not significantly interfere with this assay.   However, presence of multiple variants may affect accuracy.           Interval HPI:   Overnight events:  BG with some better control post requiring consistent sliding scale with meals.  Adjustments today for prandial insulin.  No new issues overnight.  He states his diarrhea has showed some mild improvement.      Eatin%  Nausea: No  Hypoglycemia and intervention: No  Fever: No  TPN and/or TF: No  If yes, type of TF/TPN and rate: NA    BP (!) 157/76   Pulse 65   Temp 97.7 °F (36.5 °C) (Oral)   Resp 20   Ht 6' 2" (1.88 m)   Wt 75.8 kg (167 lb 1.7 oz)   SpO2 96%   BMI 21.46 kg/m²     Labs Reviewed and Include    Recent Labs   Lab 24  0425   *   CALCIUM 7.6*   *   K 3.6   CO2 21*      BUN 13   CREATININE 0.8     Lab Results   Component Value Date    WBC 4.73 2024    HGB 9.6 (L) 2024    HCT 29.8 (L) 2024    MCV 83 2024     " "03/19/2024     Recent Labs   Lab 03/15/24  1907   TSH 0.412     Lab Results   Component Value Date    HGBA1C 8.7 (H) 02/29/2024       Nutritional status:   Body mass index is 21.46 kg/m².  Lab Results   Component Value Date    ALBUMIN 2.6 (L) 03/15/2024    ALBUMIN 2.6 (L) 03/04/2024    ALBUMIN 2.7 (L) 03/03/2024     No results found for: "PREALBUMIN"    Estimated Creatinine Clearance: 79 mL/min (based on SCr of 0.8 mg/dL).    Accu-Checks  Recent Labs     03/17/24  0319 03/17/24  0847 03/17/24  1135 03/17/24  1649 03/17/24  1951 03/18/24  0725 03/18/24  1156 03/18/24  1556 03/18/24  2105 03/19/24  0755   POCTGLUCOSE 247* 139* 124* 265* 244* 117* 166* 215* 213* 111*       Current Medications and/or Treatments Impacting Glycemic Control  Immunotherapy:    Immunosuppressants       None          Steroids:   Hormones (From admission, onward)      Start     Stop Route Frequency Ordered    03/17/24 0140  melatonin tablet 6 mg         -- Oral Nightly PRN 03/17/24 0040          Pressors:    Autonomic Drugs (From admission, onward)      None          Hyperglycemia/Diabetes Medications:   Antihyperglycemics (From admission, onward)      Start     Stop Route Frequency Ordered    03/19/24 0715  insulin aspart U-100 pen 4 Units         -- SubQ 3 times daily with meals 03/18/24 2135    03/18/24 0945  insulin detemir U-100 (Levemir) pen 6 Units         -- SubQ 2 times daily 03/18/24 0933    03/17/24 1208  insulin aspart U-100 pen 0-5 Units         -- SubQ Before meals & nightly PRN 03/17/24 1109            ASSESSMENT and PLAN    Renal/  BRENDAN (acute kidney injury)  Creatinine at admission 1.9 from baseline around 1.2.  Insulin could stay in his system for longer since it is renally cleared, we will be conservative with insulin adjustments.  Now improved on latest labs.       Endocrine  DKA (diabetic ketoacidosis)  Now resolved, UTI implemented.        Type 2 diabetes mellitus with hyperglycemia, with long-term current use of " insulin  Key History and Diagnostic Findings  Suspected Etiology for DKA: UTI  Lab Results   Component Value Date    BHYDRXBUT 6.1 (H) 03/15/2024    BHYDRXBUT 5.3 (H) 03/23/2023    CO2 21 (L) 03/19/2024    CO2 23 03/18/2024    CO2 20 (L) 03/18/2024    CO2 22 (L) 03/18/2024    ANIONGAP 5 (L) 03/19/2024    ANIONGAP 7 (L) 03/18/2024    ANIONGAP 8 03/18/2024    ANIONGAP 8 03/18/2024    CREATININE 0.8 03/19/2024    CREATININE 1.0 03/18/2024      Due to BG down trending overnight the basal insulin dose will be adjusted as below.      Plan  - Continue Levemir to 6 units BID  - Increase Aspart to 4 units TIDAC, if eating + LDSSI    - POCT glucose check ACHS   - Will follow for home insulin needs  - Desires visit with us in the office after discharge, which we can set up in coming weeks        Volodymyr Ayers, DO  Endocrinology

## 2024-03-20 VITALS
HEIGHT: 74 IN | SYSTOLIC BLOOD PRESSURE: 135 MMHG | BODY MASS INDEX: 21.45 KG/M2 | WEIGHT: 167.13 LBS | OXYGEN SATURATION: 95 % | HEART RATE: 73 BPM | RESPIRATION RATE: 17 BRPM | DIASTOLIC BLOOD PRESSURE: 67 MMHG | TEMPERATURE: 98 F

## 2024-03-20 LAB
ALBUMIN SERPL BCP-MCNC: 2.3 G/DL (ref 3.5–5.2)
ALP SERPL-CCNC: 116 U/L (ref 55–135)
ALT SERPL W/O P-5'-P-CCNC: 10 U/L (ref 10–44)
ANION GAP SERPL CALC-SCNC: 7 MMOL/L (ref 8–16)
AST SERPL-CCNC: 13 U/L (ref 10–40)
BACTERIA BLD CULT: NORMAL
BACTERIA STL CULT: NORMAL
BASOPHILS # BLD AUTO: 0.03 K/UL (ref 0–0.2)
BASOPHILS NFR BLD: 0.6 % (ref 0–1.9)
BILIRUB SERPL-MCNC: 0.3 MG/DL (ref 0.1–1)
BUN SERPL-MCNC: 13 MG/DL (ref 8–23)
CALCIUM SERPL-MCNC: 8 MG/DL (ref 8.7–10.5)
CHLORIDE SERPL-SCNC: 105 MMOL/L (ref 95–110)
CO2 SERPL-SCNC: 23 MMOL/L (ref 23–29)
CREAT SERPL-MCNC: 1 MG/DL (ref 0.5–1.4)
DIFFERENTIAL METHOD BLD: ABNORMAL
EOSINOPHIL # BLD AUTO: 0.2 K/UL (ref 0–0.5)
EOSINOPHIL NFR BLD: 3.4 % (ref 0–8)
ERYTHROCYTE [DISTWIDTH] IN BLOOD BY AUTOMATED COUNT: 16.8 % (ref 11.5–14.5)
EST. GFR  (NO RACE VARIABLE): >60 ML/MIN/1.73 M^2
GLUCOSE SERPL-MCNC: 262 MG/DL (ref 70–110)
HCT VFR BLD AUTO: 32.1 % (ref 40–54)
HGB BLD-MCNC: 10.3 G/DL (ref 14–18)
IMM GRANULOCYTES # BLD AUTO: 0.01 K/UL (ref 0–0.04)
IMM GRANULOCYTES NFR BLD AUTO: 0.2 % (ref 0–0.5)
LYMPHOCYTES # BLD AUTO: 1 K/UL (ref 1–4.8)
LYMPHOCYTES NFR BLD: 21.2 % (ref 18–48)
MAGNESIUM SERPL-MCNC: 1.9 MG/DL (ref 1.6–2.6)
MCH RBC QN AUTO: 26.3 PG (ref 27–31)
MCHC RBC AUTO-ENTMCNC: 32.1 G/DL (ref 32–36)
MCV RBC AUTO: 82 FL (ref 82–98)
MONOCYTES # BLD AUTO: 0.5 K/UL (ref 0.3–1)
MONOCYTES NFR BLD: 10.8 % (ref 4–15)
NEUTROPHILS # BLD AUTO: 3 K/UL (ref 1.8–7.7)
NEUTROPHILS NFR BLD: 63.8 % (ref 38–73)
NRBC BLD-RTO: 0 /100 WBC
PLATELET # BLD AUTO: 195 K/UL (ref 150–450)
PMV BLD AUTO: 10.1 FL (ref 9.2–12.9)
POCT GLUCOSE: 106 MG/DL (ref 70–110)
POCT GLUCOSE: 183 MG/DL (ref 70–110)
POCT GLUCOSE: 219 MG/DL (ref 70–110)
POTASSIUM SERPL-SCNC: 4.1 MMOL/L (ref 3.5–5.1)
PROT SERPL-MCNC: 5.4 G/DL (ref 6–8.4)
RBC # BLD AUTO: 3.92 M/UL (ref 4.6–6.2)
SODIUM SERPL-SCNC: 135 MMOL/L (ref 136–145)
WBC # BLD AUTO: 4.71 K/UL (ref 3.9–12.7)

## 2024-03-20 PROCEDURE — 85025 COMPLETE CBC W/AUTO DIFF WBC: CPT

## 2024-03-20 PROCEDURE — 25000003 PHARM REV CODE 250: Performed by: STUDENT IN AN ORGANIZED HEALTH CARE EDUCATION/TRAINING PROGRAM

## 2024-03-20 PROCEDURE — 80053 COMPREHEN METABOLIC PANEL: CPT

## 2024-03-20 PROCEDURE — 83735 ASSAY OF MAGNESIUM: CPT

## 2024-03-20 PROCEDURE — 25000003 PHARM REV CODE 250

## 2024-03-20 RX ORDER — AMLODIPINE BESYLATE 5 MG/1
5 TABLET ORAL DAILY
Status: DISCONTINUED | OUTPATIENT
Start: 2024-03-20 | End: 2024-03-20 | Stop reason: HOSPADM

## 2024-03-20 RX ORDER — INSULIN ASPART 100 [IU]/ML
5 INJECTION, SOLUTION INTRAVENOUS; SUBCUTANEOUS
Status: DISCONTINUED | OUTPATIENT
Start: 2024-03-20 | End: 2024-03-20 | Stop reason: HOSPADM

## 2024-03-20 RX ORDER — INSULIN LISPRO 100 [IU]/ML
5 INJECTION, SOLUTION INTRAVENOUS; SUBCUTANEOUS
Qty: 10 ML | Refills: 0 | Status: SHIPPED | OUTPATIENT
Start: 2024-03-20

## 2024-03-20 RX ORDER — LOPERAMIDE HYDROCHLORIDE 2 MG/1
2 CAPSULE ORAL 4 TIMES DAILY PRN
Refills: 0
Start: 2024-03-20 | End: 2024-03-30

## 2024-03-20 RX ORDER — INSULIN ASPART 100 [IU]/ML
0-5 INJECTION, SOLUTION INTRAVENOUS; SUBCUTANEOUS
Qty: 3 ML | Refills: 0 | Status: SHIPPED | OUTPATIENT
Start: 2024-03-20 | End: 2025-03-20

## 2024-03-20 RX ORDER — AMLODIPINE BESYLATE 5 MG/1
5 TABLET ORAL DAILY
Qty: 30 TABLET | Refills: 1 | Status: SHIPPED | OUTPATIENT
Start: 2024-03-20 | End: 2025-03-20

## 2024-03-20 RX ADMIN — AMLODIPINE BESYLATE 5 MG: 5 TABLET ORAL at 12:03

## 2024-03-20 RX ADMIN — AMIODARONE HYDROCHLORIDE 200 MG: 200 TABLET ORAL at 08:03

## 2024-03-20 RX ADMIN — APIXABAN 5 MG: 5 TABLET, FILM COATED ORAL at 08:03

## 2024-03-20 RX ADMIN — INSULIN ASPART 2 UNITS: 100 INJECTION, SOLUTION INTRAVENOUS; SUBCUTANEOUS at 08:03

## 2024-03-20 RX ADMIN — INSULIN ASPART 5 UNITS: 100 INJECTION, SOLUTION INTRAVENOUS; SUBCUTANEOUS at 08:03

## 2024-03-20 RX ADMIN — LOSARTAN POTASSIUM 25 MG: 25 TABLET, FILM COATED ORAL at 08:03

## 2024-03-20 RX ADMIN — INSULIN ASPART 5 UNITS: 100 INJECTION, SOLUTION INTRAVENOUS; SUBCUTANEOUS at 12:03

## 2024-03-20 RX ADMIN — ATORVASTATIN CALCIUM 40 MG: 40 TABLET, FILM COATED ORAL at 08:03

## 2024-03-20 RX ADMIN — LACOSAMIDE 100 MG: 50 TABLET, FILM COATED ORAL at 08:03

## 2024-03-20 RX ADMIN — ASPIRIN 81 MG CHEWABLE TABLET 81 MG: 81 TABLET CHEWABLE at 08:03

## 2024-03-20 RX ADMIN — TAMSULOSIN HYDROCHLORIDE 0.4 MG: 0.4 CAPSULE ORAL at 08:03

## 2024-03-20 RX ADMIN — INSULIN DETEMIR 6 UNITS: 100 INJECTION, SOLUTION SUBCUTANEOUS at 08:03

## 2024-03-20 NOTE — PLAN OF CARE
NURSING HOME ORDERS    03/20/2024  [facility]  [facility] ([facility])  [address]  [place] [address]  Dept: [phone]  Loc: [phone]     Admit to Nursing Home:  Skilled Nursing Home    Diagnoses:  Active Hospital Problems    Diagnosis  POA    *Hypovolemic shock [R57.1]  Yes    Positive blood culture [R78.81]  Yes    Candiduria [B37.49]  Yes    Diarrhea [R19.7]  Yes    Dyslipidemia associated with type 2 diabetes mellitus [E11.69, E78.5]  Yes     Chronic    DKA (diabetic ketoacidosis) [E11.10]  Yes    Debility [R53.81]  Yes    History of partial seizures [Z86.69]  Not Applicable     Chronic    History of embolic stroke [Z86.73]  Not Applicable     Chronic    Paroxysmal atrial fibrillation [I48.0]  Yes     Chronic    Coronary artery disease involving native coronary artery of native heart with unstable angina pectoris [I25.110]  Yes     Chronic    BRENDAN (acute kidney injury) [N17.9]  Yes    Type 2 diabetes mellitus with hyperglycemia, with long-term current use of insulin [E11.65, Z79.4]  Not Applicable     Chronic    Essential hypertension [I10]  Yes     Chronic      Resolved Hospital Problems   No resolved problems to display.       Patient is homebound due to:  Hypovolemic shock    Allergies:  Review of patient's allergies indicates:   Allergen Reactions    Iodine      Other reaction(s): swelling  Other reaction(s): Itching  Other reaction(s): Rash / IVP       Vitals:  Routine    Diet: diabetic diet: 2000 calorie    Activities:   Activity as tolerated    Goals of Care Treatment Preferences:  Code Status: DNR    Living Will: Yes  LaPOST: Yes             Nursing Precautions:  Aspiration  and Fall    Consults:   PT to evaluate and treat- 3 times a week and OT to evaluate and treat- 3 times a week                      Diabetes Care:  SN to perform and educate Diabetic management with blood glucose monitoring: and Fingerstick blood sugar AC and  HS      Medications: Discontinue all previous medication orders, if any. See new list below.     Medication List        START taking these medications      amLODIPine 5 MG tablet  Commonly known as: NORVASC  Take 1 tablet (5 mg total) by mouth once daily.     insulin aspart U-100 100 unit/mL (3 mL) Inpn pen  Commonly known as: NovoLOG  Inject 0-5 Units into the skin before meals and at bedtime as needed (Hyperglycemia).     loperamide 2 mg capsule  Commonly known as: IMODIUM  Take 1 capsule (2 mg total) by mouth 4 (four) times daily as needed for Diarrhea.            CHANGE how you take these medications      insulin detemir U-100 (Levemir) 100 unit/mL (3 mL) Inpn pen  Inject 6 Units into the skin 2 (two) times daily.  What changed:   how much to take  when to take this     insulin lispro 100 unit/mL injection  Inject 5 Units into the skin 3 (three) times daily before meals.  What changed: how much to take            CONTINUE taking these medications      acetaminophen 500 MG tablet  Commonly known as: TYLENOL  Take 1 tablet (500 mg total) by mouth every 8 (eight) hours.     alendronate 70 MG tablet  Commonly known as: FOSAMAX  Take 70 mg by mouth every 7 days.     amiodarone 200 MG Tab  Commonly known as: PACERONE  Take 1 tablet (200 mg total) by mouth once daily.     ascorbic acid (vitamin C) 500 MG tablet  Commonly known as: VITAMIN C  Take 500 mg by mouth 2 (two) times daily.     atorvastatin 40 MG tablet  Commonly known as: LIPITOR  Take 1 tablet (40 mg total) by mouth once daily.     ELIQUIS 5 mg Tab  Generic drug: apixaban  Take 5 mg by mouth 2 (two) times daily.     glucose 4 GM chewable tablet  Take 6 tablets (24 g total) by mouth as needed for Low blood sugar (< 50).     lacosamide 100 mg Tab  Commonly known as: VIMPAT  Take 100 mg by mouth every 12 (twelve) hours.     losartan 25 MG tablet  Commonly known as: COZAAR  Take 25 mg by mouth once daily.     nitroGLYCERIN 0.4 MG SL tablet  Commonly known as:  NITROSTAT  Place 1 tablet (0.4 mg total) under the tongue every 5 (five) minutes as needed for Chest pain.     ondansetron 4 MG tablet  Commonly known as: ZOFRAN  Take 4 mg by mouth every 6 (six) hours as needed for Nausea.     pantoprazole 40 MG tablet  Commonly known as: PROTONIX  Take 1 tablet (40 mg total) by mouth once daily.     polyethylene glycol 17 gram/dose powder  Commonly known as: GLYCOLAX  Use cap to measure out (17 g) mix with a liquid and take by mouth once daily.     SITagliptin phosphate 50 MG Tab  Commonly known as: JANUVIA  Take 1 tablet (50 mg total) by mouth once daily.     sucralfate 1 gram tablet  Commonly known as: CARAFATE  Take 1 g by mouth 3 (three) times daily before meals.     tamsulosin 0.4 mg Cap  Commonly known as: FLOMAX  Take 1 capsule by mouth once daily.     vitamin D 1000 units Tab  Commonly known as: VITAMIN D3  Take 1,000 Units by mouth once daily.            STOP taking these medications      LANTUS SOLOSTAR U-100 INSULIN glargine 100 units/mL SubQ pen  Generic drug: insulin     metFORMIN 500 MG tablet  Commonly known as: GLUCOPHAGE                Immunizations Administered as of 3/20/2024       Name Date Dose VIS Date Route Exp Date    COVID-19, MRNA, LN-S, PF (Pfizer) (Purple Cap) 10/29/2021 0.3 mL 5/10/2021 Intramuscular --    Site: Left arm     : [employer]     Lot: [id number]     Comment: Adminis     COVID-19, MRNA, LN-S, PF (Pfizer) (Purple Cap) 1/29/2021 0.3 mL 1/5/2021 Intramuscular --    Site: Left deltoid     : [employer]     Lot: [id number]     Comment: Adminis                 _________________________________  Candelaria Waite MD  03/20/2024

## 2024-03-20 NOTE — NURSING
Pt to be d/c'ed back to NH in ambulance, I called report to nurse Valeri, IV sites d/c'ed, caths intact, pt jania well, d/c'ed tele, pt AO x 4, no distress noted just waiting on ambulance to arrive.

## 2024-03-20 NOTE — ASSESSMENT & PLAN NOTE
On eliquis 5 BID, but given current reduced renal function and his age, meets criteria for reduced dose.     - Continue Eliquis  5 BID

## 2024-03-20 NOTE — ASSESSMENT & PLAN NOTE
Chronic, controlled. Latest blood pressure and vitals reviewed-     Temp:  [97.5 °F (36.4 °C)-98 °F (36.7 °C)]   Pulse:  [61-74]   Resp:  [14-20]   BP: (141-184)/(66-78)   SpO2:  [94 %-98 %] .   Home meds for hypertension were reviewed and noted below.   Hypertension Medications               losartan (COZAAR) 25 MG tablet Take 25 mg by mouth once daily.    nitroGLYCERIN (NITROSTAT) 0.4 MG SL tablet Place 1 tablet (0.4 mg total) under the tongue every 5 (five) minutes as needed for Chest pain.            While in the hospital, will manage blood pressure as follows; Continue home antihypertensive regimen    Will utilize p.r.n. blood pressure medication only if patient's blood pressure greater than 180/110 and he develops symptoms such as worsening chest pain or shortness of breath.   05-Oct-2020 05:00

## 2024-03-20 NOTE — ASSESSMENT & PLAN NOTE
Patient presented with 5 days of diarrhea from nursing home with DKA, hypotension requiring pressor support. Diarrhea apparently persistent issue as previous hospitalization also had report of diarrhea. Likely contributing to dehydration and hypovolemic state.     - Daily CMP to assess for electrolyte imbalances.  Patient was complaining of diarrhea, C diff testing was negative.  Stool studies were ordered.    Improved with immodium

## 2024-03-20 NOTE — PLAN OF CARE
Problem: Adjustment to Illness (Delirium)  Goal: Optimal Coping  Outcome: Met     Problem: Altered Behavior (Delirium)  Goal: Improved Behavioral Control  Outcome: Met     Problem: Attention and Thought Clarity Impairment (Delirium)  Goal: Improved Attention and Thought Clarity  Outcome: Met     Problem: Sleep Disturbance (Delirium)  Goal: Improved Sleep  Outcome: Met     Problem: Adult Inpatient Plan of Care  Goal: Plan of Care Review  Outcome: Met  Goal: Patient-Specific Goal (Individualized)  Outcome: Met  Goal: Absence of Hospital-Acquired Illness or Injury  Outcome: Met  Goal: Optimal Comfort and Wellbeing  Outcome: Met  Goal: Readiness for Transition of Care  Outcome: Met     Problem: Diabetic Ketoacidosis  Goal: Fluid and Electrolyte Balance with Absence of Ketosis  Outcome: Met     Problem: Diabetes Comorbidity  Goal: Blood Glucose Level Within Targeted Range  Outcome: Met     Problem: Fluid and Electrolyte Imbalance (Acute Kidney Injury/Impairment)  Goal: Fluid and Electrolyte Balance  Outcome: Met     Problem: Oral Intake Inadequate (Acute Kidney Injury/Impairment)  Goal: Optimal Nutrition Intake  Outcome: Met     Problem: Renal Function Impairment (Acute Kidney Injury/Impairment)  Goal: Effective Renal Function  Outcome: Met     Problem: Impaired Wound Healing  Goal: Optimal Wound Healing  Outcome: Met     Problem: Fall Injury Risk  Goal: Absence of Fall and Fall-Related Injury  Outcome: Met     Problem: Infection  Goal: Absence of Infection Signs and Symptoms  Outcome: Met

## 2024-03-20 NOTE — SUBJECTIVE & OBJECTIVE
Interval history:  No overnight events.  Patient reports significant improvement in symptoms.  Diarrhea improved with imodium.      Review of Systems   Constitutional:  Negative for chills and fever.   Eyes:  Negative for visual disturbance.   Respiratory:  Negative for cough and shortness of breath.    Cardiovascular:  Negative for chest pain.   Gastrointestinal:  Positive for diarrhea and vomiting. Negative for nausea.   Genitourinary:  Positive for difficulty urinating. Negative for dysuria.   Musculoskeletal:  Positive for back pain.   Skin:  Positive for wound.   Neurological:  Negative for headaches.     Objective:     Vital Signs (Most Recent):  Temp: 97.9 °F (36.6 °C) (03/19/24 2045)  Pulse: 74 (03/19/24 2045)  Resp: 18 (03/19/24 2045)  BP: (!) 184/77 (03/19/24 2045)  SpO2: 95 % (03/19/24 2045) Vital Signs (24h Range):  Temp:  [97.5 °F (36.4 °C)-98 °F (36.7 °C)] 97.9 °F (36.6 °C)  Pulse:  [61-74] 74  Resp:  [14-20] 18  SpO2:  [94 %-98 %] 95 %  BP: (141-184)/(66-78) 184/77   Weight: 75.8 kg (167 lb 1.7 oz)  Body mass index is 21.46 kg/m².    No intake or output data in the 24 hours ending 03/19/24 2112         Physical Exam  Vitals reviewed.   Constitutional:       General: He is not in acute distress.     Appearance: Normal appearance. He is ill-appearing.   HENT:      Head: Normocephalic and atraumatic.      Nose: No congestion or rhinorrhea.      Mouth/Throat:      Mouth: Mucous membranes are dry.      Pharynx: Oropharynx is clear.   Cardiovascular:      Rate and Rhythm: Regular rhythm. Tachycardia present.      Pulses: Normal pulses.      Heart sounds: Normal heart sounds.   Pulmonary:      Effort: Pulmonary effort is normal. No respiratory distress.      Breath sounds: Normal breath sounds.   Abdominal:      General: Bowel sounds are normal.      Palpations: Abdomen is soft.      Tenderness: There is no abdominal tenderness.   Genitourinary:     Comments: Mccord with cloudy yellow output  Musculoskeletal:          General: No swelling or tenderness.      Cervical back: Full passive range of motion without pain and normal range of motion.   Skin:     General: Skin is warm and dry.      Comments: Minor sacral wound, no skin breakdown   Neurological:      General: No focal deficit present.      Mental Status: He is alert and oriented to person, place, and time. Mental status is at baseline.   Psychiatric:         Mood and Affect: Mood normal.         Behavior: Behavior normal.            Vents:     Lines/Drains/Airways       Drain  Duration             Male External Urinary Catheter 03/16/24 0730 Large 3 days              Peripheral Intravenous Line  Duration                  Peripheral IV - Single Lumen 03/15/24 1905 20 G Anterior;Left Upper Arm 4 days         Peripheral IV - Single Lumen 03/15/24 2028 18 G Anterior;Left Forearm 4 days                  Significant Labs:    CBC/Anemia Profile:  Recent Labs   Lab 03/18/24  0512 03/18/24  1207 03/19/24  0425   WBC 7.29  --  4.73   HGB 9.8*  --  9.6*   HCT 30.4*  --  29.8*     --  196   MCV 84  --  83   RDW 17.0*  --  16.9*   OCCULTBLOOD  --  Positive*  --           Chemistries:  Recent Labs   Lab 03/18/24  0512 03/18/24  0808 03/18/24  1132 03/18/24  1603 03/19/24  0425   *   < > 134* 138 135*   K 3.9   < > 3.9 3.9 3.6      < > 106 108 109   CO2 21*   < > 20* 23 21*   BUN 17   < > 15 15 13   CREATININE 1.1   < > 1.1 1.0 0.8   CALCIUM 7.8*   < > 8.0* 7.6* 7.6*   MG 1.7  --   --   --  1.6    < > = values in this interval not displayed.         All pertinent labs within the past 24 hours have been reviewed.    Significant Imaging: I have reviewed all pertinent imaging results/findings within the past 24 hours.

## 2024-03-20 NOTE — PLAN OF CARE
03/20/24 0945   Post-Acute Status   Post-Acute Authorization Placement   Post-Acute Placement Status Set-up Complete/Auth obtained   Coverage MEDICARE - MEDICARE PART A & B   Hospital Resources/Appts/Education Provided Appointments scheduled and added to AVS   Discharge Delays None known at this time   Discharge Plan   Discharge Plan A Return to nursing home  ([facility] Phone: [phone])     CM met with patient and spoke with daughter Basia  to discuss any changes in discharge planning.  Patients plan is to return to  [facility] Phone: [phone]    No changes in DC plans. ALLIE: 3/20/24    CM uploaded recent labs via [facility] after speaking with Shahida Dennis with [facility] patient will not need a COVID test.     1:30 pm  Patient is scheduled to dc today and left a VM with Shahida JAQUEZ.     2:40 pm  CM spoke with Shahida JAQUEZ from [facility] and will call back with a room number and number to call report for the assigned nurse    3:44 pm  Patient is assigned to room [address] on [address] [place] [address] and nurse is to call report to [phone]. Transportation scheduled for 4:30 pm      Komal Cuello RN  Case Management  [facility]  [phone]

## 2024-03-20 NOTE — NURSING
Pt AAOx4, VSS, NSR on telemetry. Pt diarrhea has resolved with no complaints on night shift. Pt ready to be discharged back to facility.

## 2024-03-20 NOTE — PLAN OF CARE
[facility] ([place])  Discharge Final Note    Primary Care Provider: Basim Guerrero MD    Expected Discharge Date: 3/20/2024    Final Discharge Note (most recent)       Final Note - 03/20/24 0945          Final Note    Hospital Resources/Appts/Education Provided Appointments scheduled and added to AVS        Post-Acute Status    Post-Acute Authorization Placement     Post-Acute Placement Status Set-up Complete/Auth obtained     Coverage MEDICARE - MEDICARE PART A & B     Discharge Delays None known at this time                     Important Message from Medicare  Important Message from Medicare regarding Discharge Appeal Rights: Explained to patient/caregiver (Verbal consent to sign from Daughter JASIEL Flor witnessed)     Date IMM was signed: 03/18/24  Time IMM was signed: 1131    Contact Info       [facility]    [address] [place] [address]       Next Steps: Follow up    Basim uGerrero MD   Specialty: Family Medicine   Relationship: PCP - General    [facility] [address]   Phone: [phone]       Next Steps: Follow up in 1 week(s)            CM met with patient and daughter via phone and discussed discharge plans, patient to return to [facility].  No medications delivered to bedside. No  HME/DME delivered   to bedside and follow up appointment[s] scheduled.   Transportation provided by [employer] via [employer].      Komal Cuello RN  Case Management  [facility]  [phone]

## 2024-03-20 NOTE — ASSESSMENT & PLAN NOTE
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     Plan:  - Continue ASA 81mg and Atorvastatin 80mg qd.  - Monitor for and prevent skin breakdown and pressure ulcers, wound care consult as needed  - Early mobility, OOB in chair at least 3 hours per day, repositioning/weight shifting every 20-30 minutes when sitting, turn patient every 2 hours, proper mattress/overlay and chair cushioning, pressure relief/heel protector boots  - DVT prophylaxis covered with Eliquis 5 BID

## 2024-03-20 NOTE — PLAN OF CARE
Endocrine Plan of Care: 03/20/2024    Chart reviewed over the past 24 hours including blood glucose trend.  In need of more prandial insulin with plans as below.           ASSESSMENT and PLAN     Renal/  BRENDAN (acute kidney injury)  Creatinine at admission 1.9 from baseline around 1.2.  Insulin could stay in his system for longer since it is renally cleared, we will be conservative with insulin adjustments.  Now improved on labs      Endocrine  DKA (diabetic ketoacidosis)  Now resolved, UTI implemented.        Type 2 diabetes mellitus with hyperglycemia, with long-term current use of insulin    Suspected Etiology for DKA: UTI  BG better controlled now.        Plan  - Continue Levemir to 6 units BID  - Increase Aspart to 5 units TIDAC + LDSSI     - POCT glucose check ACHS   - Will follow for home insulin needs  - Desires visit with us in the office after discharge, which we can set up in coming weeks      DC Recs:  If plans for discharge today then we would recommend the above insulin regimen.     Levemir 6 units BID (or equivalent basal)   Novolog 5 units with meals plus low dose scale   Low dose:  Novolog correction based on before meal glucose:  150-200: add 1 unit  201-250: add 2 units  251-300: add 3 units  301-350: add 4 units  >350: add 5 units  Needs to be sure he has insulin pens, pen needles and glucometer with lancets and test strips.       Volodymyr Ayers, DO  Endocrinology

## 2024-03-20 NOTE — NURSING
Pt left on stretcher with 2 EMT's, AO x4, took off condom cath, paperwork given to EMT's, report was called earlier, no distress noted.

## 2024-03-20 NOTE — NURSING
Pt AAO, VSS, NSR on tele, only 1 episode of diarrhea today.  Plan is to d/c pt back to NH tomorrow.    No acute events this shift, bed low and locked, call light in reach.

## 2024-03-20 NOTE — PROGRESS NOTES
[facility] ([place] [address])  [facility] Medicine  Progress Note    Patient Name: Kamar uMñoz  MRN: [id number]  Patient Class: IP- Inpatient   Admission Date: 3/15/2024  Length of Stay: 4 days  Attending Physician: Candelaria Waite MD  Primary Care Provider: Basim Guerrero MD        Subjective:     Principal Problem:Hypovolemic shock        HPI:  Kamar Muñoz is an 78-year-old man with IDDM, CVA with residual left-sided deficits, bed-bound at baseline, HTN, nephrolithiasis, paroxysmal AFib on Eliquis, CAD s/p MI, who presents with hypotension, vomiting, and diarrhea from NH. Patient reports he has been having diarrhea about 5 times a day for the last several days, and one episode of emesis today, with some abdominal cramping. He also reports ongoing dizziness and lightheadedness. He is a somewhat poor historian but he denies any recent changes to his medications. On review of systems, he denies SOB, chest pain, blood in his stool or vomit, and reports no abdominal pain at present. Patient was recently hospitalized from 2/29 to 3/4 due to episode of hypoxemia and hypotension, thought to be due to symptomatic bradycardia. He was discharged with his beta blocker discontinued, and received IVF and antibiotics for UTI.     In the ED, patient tachycardic HR 100s, hypotensive 71/35 and received 2L of LR with BP only temporarily responsive, so pt was started on peripheral levophed. Initial labs notable for K 5.3, Bicarb 6, , BHB 6.1. Lactate 7.5. pH 7.121, CO2 24.9. Patient admitted to the MICU for DKA and shock requiring pressors.    Overview/Hospital Course:   anion gap closed x2.  Patient was transitioned to subcutaneous insulin by endocrinology team.  Patient was step-down to hospital medicine team.  Diet was advanced.  Blood cultures grew staph epidermidis.  Most likely contaminant.  Vancomycin discontinued.  Patient was complaining of diarrhea, C diff testing was negative.   Stool studies were ordered.  Diarrhea improved with imodium.         Interval history:  No overnight events.  Patient reports significant improvement in symptoms.  Diarrhea improved with imodium.      Review of Systems   Constitutional:  Negative for chills and fever.   Eyes:  Negative for visual disturbance.   Respiratory:  Negative for cough and shortness of breath.    Cardiovascular:  Negative for chest pain.   Gastrointestinal:  Positive for diarrhea and vomiting. Negative for nausea.   Genitourinary:  Positive for difficulty urinating. Negative for dysuria.   Musculoskeletal:  Positive for back pain.   Skin:  Positive for wound.   Neurological:  Negative for headaches.     Objective:     Vital Signs (Most Recent):  Temp: 97.9 °F (36.6 °C) (03/19/24 2045)  Pulse: 74 (03/19/24 2045)  Resp: 18 (03/19/24 2045)  BP: (!) 184/77 (03/19/24 2045)  SpO2: 95 % (03/19/24 2045) Vital Signs (24h Range):  Temp:  [97.5 °F (36.4 °C)-98 °F (36.7 °C)] 97.9 °F (36.6 °C)  Pulse:  [61-74] 74  Resp:  [14-20] 18  SpO2:  [94 %-98 %] 95 %  BP: (141-184)/(66-78) 184/77   Weight: 75.8 kg (167 lb 1.7 oz)  Body mass index is 21.46 kg/m².    No intake or output data in the 24 hours ending 03/19/24 2112         Physical Exam  Vitals reviewed.   Constitutional:       General: He is not in acute distress.     Appearance: Normal appearance. He is ill-appearing.   HENT:      Head: Normocephalic and atraumatic.      Nose: No congestion or rhinorrhea.      Mouth/Throat:      Mouth: Mucous membranes are dry.      Pharynx: Oropharynx is clear.   Cardiovascular:      Rate and Rhythm: Regular rhythm. Tachycardia present.      Pulses: Normal pulses.      Heart sounds: Normal heart sounds.   Pulmonary:      Effort: Pulmonary effort is normal. No respiratory distress.      Breath sounds: Normal breath sounds.   Abdominal:      General: Bowel sounds are normal.      Palpations: Abdomen is soft.      Tenderness: There is no abdominal tenderness.  "  Genitourinary:     Comments: Mccord with cloudy yellow output  Musculoskeletal:         General: No swelling or tenderness.      Cervical back: Full passive range of motion without pain and normal range of motion.   Skin:     General: Skin is warm and dry.      Comments: Minor sacral wound, no skin breakdown   Neurological:      General: No focal deficit present.      Mental Status: He is alert and oriented to person, place, and time. Mental status is at baseline.   Psychiatric:         Mood and Affect: Mood normal.         Behavior: Behavior normal.            Vents:     Lines/Drains/Airways       Drain  Duration             Male External Urinary Catheter 03/16/24 0730 Large 3 days              Peripheral Intravenous Line  Duration                  Peripheral IV - Single Lumen 03/15/24 1905 20 G Anterior;Left Upper Arm 4 days         Peripheral IV - Single Lumen 03/15/24 2028 18 G Anterior;Left Forearm 4 days                  Significant Labs:    CBC/Anemia Profile:  Recent Labs   Lab 03/18/24  0512 03/18/24  1207 03/19/24  0425   WBC 7.29  --  4.73   HGB 9.8*  --  9.6*   HCT 30.4*  --  29.8*     --  196   MCV 84  --  83   RDW 17.0*  --  16.9*   OCCULTBLOOD  --  Positive*  --           Chemistries:  Recent Labs   Lab 03/18/24  0512 03/18/24  0808 03/18/24  1132 03/18/24  1603 03/19/24  0425   *   < > 134* 138 135*   K 3.9   < > 3.9 3.9 3.6      < > 106 108 109   CO2 21*   < > 20* 23 21*   BUN 17   < > 15 15 13   CREATININE 1.1   < > 1.1 1.0 0.8   CALCIUM 7.8*   < > 8.0* 7.6* 7.6*   MG 1.7  --   --   --  1.6    < > = values in this interval not displayed.         All pertinent labs within the past 24 hours have been reviewed.    Significant Imaging: I have reviewed all pertinent imaging results/findings within the past 24 hours.    Assessment/Plan:      * Hypovolemic shock  See "DKA" for specifics for management. Patient presented with hypotension and MAP < 60 in the ED, despite 2L IVF. Started " on peripheral levophed. Lactic elevated at 7 and pt acidemic. Likely due to dehydration in DKA and continued GI losses from diarrhea; though there may be an underlying infectious etiology as well. On exam, patient with cloudy, purulent appearing urine and had been recently hospitalized for suspected UTI though Ucx was negative.     Resolved         Candiduria    Patient does not currently have any lower urinary tract symptoms. Additionally it appears that the urine specimen was collected via nonsterile technique and or via a non catheterized urine sample.   No antibiotics needed    Positive blood culture  Patient with 1/4 positive blood cultures GPCs in clusters now identified as S epidermidis on rapid ID.  Patient is afebrile and without leukocytosis.   Vancomycin discontinued      Diarrhea  Patient presented with 5 days of diarrhea from nursing home with DKA, hypotension requiring pressor support. Diarrhea apparently persistent issue as previous hospitalization also had report of diarrhea. Likely contributing to dehydration and hypovolemic state.     - Daily CMP to assess for electrolyte imbalances.  Patient was complaining of diarrhea, C diff testing was negative.  Stool studies were ordered.    Improved with immodium         Dyslipidemia associated with type 2 diabetes mellitus  Continue home atorvastatin 40 mg q.d.    DKA (diabetic ketoacidosis)  78M with hx of T2DM who presents with hypotension, nausea/vomiting, and diarrhea. Pt denies any changes to his insulin and reports that he receives it regularly at his NH. However, he does not know his doses. Outside documentation suggests Levemir 30U and Aspart 4U with meals, though on our records, his home dose of levemir is 14U. Will go by weight based dosing  Initial labs with acidosis pH of 7.1, beta hydroxybutyrate of 6.1, lactate 7.5, and blood glucose of >700 consistent with DKA. Pt received 2L IVF initially in the ED with minimal improvement in BP so was  started on peripheral levo. Cause of DKA may be an ongoing UTI; pt recently hospitalized for UTI and was discharged with PO antibiotics, however pt with cloudy and purulent appearing urine.     Recommendations:  - Continuous NS at 125 mL/hr. Once BGL <200, change IVF to D5 1/2 NS at 50 mL/hr  - Monitor for volume overload and pulmonary edema  - Insulin gtt per protocol, gtt @ 0.1U/kg/hr  - q1h glucose accuchecks while on insulin gtt     Resolved.    History of partial seizures  Documented history of seizures.     - Continue home Vimpat 100mg BID      Paroxysmal atrial fibrillation  On eliquis 5 BID, but given current reduced renal function and his age, meets criteria for reduced dose.     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     Plan:  - Continue ASA 81mg and Atorvastatin 80mg qd.  - Monitor for and prevent skin breakdown and pressure ulcers, wound care consult as needed  - Early mobility, OOB in chair at least 3 hours per day, repositioning/weight shifting every 20-30 minutes when sitting, turn patient every 2 hours, proper mattress/overlay and chair cushioning, pressure relief/heel protector boots  - DVT prophylaxis covered with Eliquis 5 BID       Coronary artery disease involving native coronary artery of native heart with unstable angina pectoris  History of STEMI in 2021 with RCA LAUREN; LAD 70% medically managed.      - Continue ASA 81, and high dose statin 40mg daily.       BRENDAN (acute kidney injury)  Admission sCR 2.1, previous Cr from 11d ago 1.2 (baseline appears to be .9-1.1).  Likely pre-renal from dehydration and volume losses from diarrhea. Patient appears dry on exam.     Plan:  - Patient received IVF as part of DKA protocol for volume resuscitation.  - If BRENDAN unimproved on repeat labs, consider obtaining Urine Na, urea and urine protein/creatinine ratio, and  RP U/S to evaluate for other causes of BRENDAN.  - Strict I&Os  - Avoid nephrotoxic agents such as NSAIDs, gadolinium and IV radiocontrast.  - Renally dose meds to current GFR.    Resolved    Type 2 diabetes mellitus with hyperglycemia, with long-term current use of insulin    See DKA    Essential hypertension  Chronic, controlled. Latest blood pressure and vitals reviewed-     Temp:  [97.5 °F (36.4 °C)-98 °F (36.7 °C)]   Pulse:  [61-74]   Resp:  [14-20]   BP: (141-184)/(66-78)   SpO2:  [94 %-98 %] .   Home meds for hypertension were reviewed and noted below.   Hypertension Medications               losartan (COZAAR) 25 MG tablet Take 25 mg by mouth once daily.    nitroGLYCERIN (NITROSTAT) 0.4 MG SL tablet Place 1 tablet (0.4 mg total) under the tongue every 5 (five) minutes as needed for Chest pain.            While in the hospital, will manage blood pressure as follows; Continue home antihypertensive regimen    Will utilize p.r.n. blood pressure medication only if patient's blood pressure greater than 180/110 and he develops symptoms such as worsening chest pain or shortness of breath.      VTE Risk Mitigation (From admission, onward)           Ordered     apixaban tablet 5 mg  2 times daily         03/18/24 1354                    Discharge Planning   ALLIE: 3/20/2024     Code Status: DNR   Is the patient medically ready for discharge?: No    Reason for patient still in hospital (select all that apply): Patient trending condition, Laboratory test, and Treatment  Discharge Plan A: Return to nursing home   Discharge Delays: None known at this time              Candelaria Waite MD  Department of Hospital Medicine   [facility] ([place])

## 2024-03-20 NOTE — ASSESSMENT & PLAN NOTE
Continue home atorvastatin 40 mg q.d.   [employer] Pregnancy And Lactation Text: This medication is Pregnancy Category B and is considered safe. There is some data to suggest avoiding during the third trimester. It is unknown if this medication is excreted in breast milk.

## 2024-03-21 LAB
A1AT STL-MCNC: 6 MG/DL
CALPROTECTIN STL-MCNT: 381 MCG/G
FAT STL QL: NORMAL
NEUTRAL FAT STL QL: NORMAL

## 2024-03-21 NOTE — DISCHARGE SUMMARY
[facility]down [facility] ([place] [id number])  [facility] Medicine  Discharge Summary      Patient Name: Kamar Muñoz  MRN: [id number]  JAMEEL: [id number]  Patient Class: IP- Inpatient  Admission Date: 3/15/2024  Hospital Length of Stay: 5 days  Discharge Date and Time: 3/20/2024  8:48 PM  Attending Physician: Rama att. providers found   Discharging Provider: Candelaria Waite MD  Primary Care Provider: Basim Guerrero MD  Hospital Medicine Team: [facility] Candelaria Waite MD  Primary Care Team: [facility]    HPI:   Kamar Muñoz is an 78-year-old man with IDDM, CVA with residual left-sided deficits, bed-bound at baseline, HTN, nephrolithiasis, paroxysmal AFib on Eliquis, CAD s/p MI, who presents with hypotension, vomiting, and diarrhea from NH. Patient reports he has been having diarrhea about 5 times a day for the last several days, and one episode of emesis today, with some abdominal cramping. He also reports ongoing dizziness and lightheadedness. He is a somewhat poor historian but he denies any recent changes to his medications. On review of systems, he denies SOB, chest pain, blood in his stool or vomit, and reports no abdominal pain at present. Patient was recently hospitalized from 2/29 to 3/4 due to episode of hypoxemia and hypotension, thought to be due to symptomatic bradycardia. He was discharged with his beta blocker discontinued, and received IVF and antibiotics for UTI.     In the ED, patient tachycardic HR 100s, hypotensive 71/35 and received 2L of LR with BP only temporarily responsive, so pt was started on peripheral levophed. Initial labs notable for K 5.3, Bicarb 6, , BHB 6.1. Lactate 7.5. pH 7.121, CO2 24.9. Patient admitted to the MICU for DKA and shock requiring pressors.    * No surgery found *      Hospital Course:    anion gap closed x2.  Patient was transitioned to subcutaneous insulin by endocrinology team.  Patient was step-down to hospital medicine team.   Diet was advanced.  Blood cultures grew staph epidermidis.  Most likely contaminant.  Vancomycin discontinued.  Patient was complaining of diarrhea, C diff testing was negative.  Stool studies were ordered.  Diarrhea improved with imodium.  Patient was given referral to Gastroenterology for further evaluation of diarrhea.  Patient transitioned to subcutaneous insulin 6 units b.i.d. and lispro 5 units t.i.d. with meals.    Kamar Muñoz was deemed appropriate for discharge.  At the time of discharge, the plan of care was discussed with patient/family, who were agreeable and amenable.  All medications were verbally reviewed and discussed with the patient/family.  They endorsed understanding and compliance.  Informed them that these changes will be available on their discharge paperwork, as well.  Outpatient follow-ups were scheduled, or if unable to be scheduled ambulatory referrals were placed, and ER return precautions were given.  All questions were answered to the patient/family's satisfaction.  He was subsequently discharged in stable condition.          Goals of Care Treatment Preferences:  Code Status: DNR    Living Will: Yes  LaPOST: Yes           Consults:   Consults (From admission, onward)          Status Ordering Provider     Inpatient consult to Endocrinology  Once        Provider:  (Not yet assigned)    Completed VERN CUNNINGHAM     Inpatient consult to Registered Dietitian/Nutritionist  Once        Provider:  (Not yet assigned)    Completed ZANDRA PORRAS     Inpatient consult to Critical Care Medicine  Once        Provider:  (Not yet assigned)    Completed JOVAN MURILLO new Assessment & Plan notes have been filed under this hospital service since the last note was generated.  Service: Hospital Medicine    Final Active Diagnoses:    Diagnosis Date Noted POA    PRINCIPAL PROBLEM:  Hypovolemic shock [R57.1] 03/15/2024 Yes    Positive blood culture [R78.81] 03/17/2024 Yes    Candiduria  [B37.49] 03/17/2024 Yes    Diarrhea [R19.7] 02/29/2024 Yes    Dyslipidemia associated with type 2 diabetes mellitus [E11.69, E78.5] 04/23/2022 Yes     Chronic    DKA (diabetic ketoacidosis) [E11.10]  Yes    Debility [R53.81] 02/16/2022 Yes    History of partial seizures [Z86.69] 01/26/2022 Not Applicable     Chronic    History of embolic stroke [Z86.73] 01/12/2022 Not Applicable     Chronic    Paroxysmal atrial fibrillation [I48.0] 01/12/2022 Yes     Chronic    Coronary artery disease involving native coronary artery of native heart with unstable angina pectoris [I25.110] 01/09/2022 Yes     Chronic    BRENDAN (acute kidney injury) [N17.9] 08/30/2016 Yes    Type 2 diabetes mellitus with hyperglycemia, with long-term current use of insulin [E11.65, Z79.4] 03/08/2013 Not Applicable     Chronic    Essential hypertension [I10] 07/20/2012 Yes     Chronic      Problems Resolved During this Admission:       Discharged Condition: good    Disposition: Home or Self Care    Follow Up:   Follow-up Information       [facility] Follow up.    Contact information:  [address] [place] [address]             Basim Guerrero MD Follow up in 1 week(s).    Specialty: Family Medicine  Contact information:  [address] [facility] [address] [phone]                           Patient Instructions:      Ambulatory referral/consult to Endocrinology   Standing Status: Future   Referral Priority: Routine Referral Type: Consultation   Requested Specialty: Endocrinology   Number of Visits Requested: 1     Ambulatory referral/consult to Gastroenterology   Standing Status: Future   Referral Priority: Routine Referral Type: Consultation   Referral Reason: Specialty Services Required   Requested Specialty: Gastroenterology   Number of Visits Requested: 1     Ambulatory referral/consult to Gastroenterology   Standing Status: Future   Referral Priority: Routine Referral Type: Consultation   Referral Reason:  "Specialty Services Required   Requested Specialty: Gastroenterology   Number of Visits Requested: 1       Significant Diagnostic Studies: Labs: BMP:   Recent Labs   Lab 03/19/24 0425 03/20/24  0353   * 262*   * 135*   K 3.6 4.1    105   CO2 21* 23   BUN 13 13   CREATININE 0.8 1.0   CALCIUM 7.6* 8.0*   MG 1.6 1.9   , CMP   Recent Labs   Lab 03/19/24 0425 03/20/24  0353   * 135*   K 3.6 4.1    105   CO2 21* 23   * 262*   BUN 13 13   CREATININE 0.8 1.0   CALCIUM 7.6* 8.0*   PROT  --  5.4*   ALBUMIN  --  2.3*   BILITOT  --  0.3   ALKPHOS  --  116   AST  --  13   ALT  --  10   ANIONGAP 5* 7*   , and CBC   Recent Labs   Lab 03/19/24 0425 03/20/24  0353   WBC 4.73 4.71   HGB 9.6* 10.3*   HCT 29.8* 32.1*    195     Microbiology: Blood Culture   Lab Results   Component Value Date    LABBLOO No Growth to date 03/17/2024    LABBLOO No Growth to date 03/17/2024    LABBLOO No Growth to date 03/17/2024    LABBLOO No Growth to date 03/17/2024   , Sputum Culture No results found for: "GSRESP", "RESPIRATORYC", and Urine Culture    Lab Results   Component Value Date    LABURIN (A) 03/15/2024     BEBETO GLABRATA  > 100,000 cfu/ml  Treatment of asymptomatic candiduria is not recommended (except for   specific populations). Candida isolated in the urine typically   represents colonization. If an indwelling urinary catheter is present  it should be removed or replaced.         Pending Diagnostic Studies:       Procedure Component Value Units Date/Time    Alpha-1-antitrypsin, stool [[id number]] Collected: 03/18/24 1207    Order Status: Sent Lab Status: In process Updated: 03/18/24 1502    Specimen: Stool     Calprotectin, Stool [[id number]] Collected: 03/18/24 1207    Order Status: Sent Lab Status: In process Updated: 03/18/24 1504    Specimen: Stool     Fecal fat, qualitative [[id number]] Collected: 03/18/24 1207    Order Status: Sent Lab Status: In process Updated: 03/18/24 1505    " Specimen: Stool     H. pylori antigen, stool [[id number]] Collected: 03/18/24 1207    Order Status: Sent Lab Status: In process Updated: 03/18/24 1502    Specimen: Stool     Pancreatic elastase, fecal [[id number]] Collected: 03/18/24 1207    Order Status: Sent Lab Status: In process Updated: 03/18/24 1501    Specimen: Stool            Medications:  Reconciled Home Medications:      Medication List        START taking these medications      amLODIPine 5 MG tablet  Commonly known as: NORVASC  Take 1 tablet (5 mg total) by mouth once daily.     insulin aspart U-100 100 unit/mL (3 mL) Inpn pen  Commonly known as: NovoLOG  Inject 0-5 Units into the skin before meals and at bedtime as needed (Hyperglycemia).     loperamide 2 mg capsule  Commonly known as: IMODIUM  Take 1 capsule (2 mg total) by mouth 4 (four) times daily as needed for Diarrhea.            CHANGE how you take these medications      insulin detemir U-100 (Levemir) 100 unit/mL (3 mL) Inpn pen  Inject 6 Units into the skin 2 (two) times daily.  What changed:   how much to take  when to take this     insulin lispro 100 unit/mL injection  Inject 5 Units into the skin 3 (three) times daily before meals.  What changed: how much to take            CONTINUE taking these medications      acetaminophen 500 MG tablet  Commonly known as: TYLENOL  Take 1 tablet (500 mg total) by mouth every 8 (eight) hours.     alendronate 70 MG tablet  Commonly known as: FOSAMAX  Take 70 mg by mouth every 7 days.     amiodarone 200 MG Tab  Commonly known as: PACERONE  Take 1 tablet (200 mg total) by mouth once daily.     ascorbic acid (vitamin C) 500 MG tablet  Commonly known as: VITAMIN C  Take 500 mg by mouth 2 (two) times daily.     atorvastatin 40 MG tablet  Commonly known as: LIPITOR  Take 1 tablet (40 mg total) by mouth once daily.     ELIQUIS 5 mg Tab  Generic drug: apixaban  Take 5 mg by mouth 2 (two) times daily.     glucose 4 GM chewable tablet  Take 6 tablets (24 g total) by  mouth as needed for Low blood sugar (< 50).     lacosamide 100 mg Tab  Commonly known as: VIMPAT  Take 100 mg by mouth every 12 (twelve) hours.     losartan 25 MG tablet  Commonly known as: COZAAR  Take 25 mg by mouth once daily.     nitroGLYCERIN 0.4 MG SL tablet  Commonly known as: NITROSTAT  Place 1 tablet (0.4 mg total) under the tongue every 5 (five) minutes as needed for Chest pain.     ondansetron 4 MG tablet  Commonly known as: ZOFRAN  Take 4 mg by mouth every 6 (six) hours as needed for Nausea.     pantoprazole 40 MG tablet  Commonly known as: PROTONIX  Take 1 tablet (40 mg total) by mouth once daily.     polyethylene glycol 17 gram/dose powder  Commonly known as: GLYCOLAX  Use cap to measure out (17 g) mix with a liquid and take by mouth once daily.     SITagliptin phosphate 50 MG Tab  Commonly known as: JANUVIA  Take 1 tablet (50 mg total) by mouth once daily.     sucralfate 1 gram tablet  Commonly known as: CARAFATE  Take 1 g by mouth 3 (three) times daily before meals.     tamsulosin 0.4 mg Cap  Commonly known as: FLOMAX  Take 1 capsule by mouth once daily.     vitamin D 1000 units Tab  Commonly known as: VITAMIN D3  Take 1,000 Units by mouth once daily.            STOP taking these medications      LANTUS SOLOSTAR U-100 INSULIN glargine 100 units/mL SubQ pen  Generic drug: insulin     metFORMIN 500 MG tablet  Commonly known as: GLUCOPHAGE              Indwelling Lines/Drains at time of discharge:   Lines/Drains/Airways       None                   Time spent on the discharge of patient: 39 minutes         Candelaria Waite MD  Department of Hospital Medicine  [facility] - Stepdown Flex ([place])   98

## 2024-03-22 LAB
BACTERIA BLD CULT: NORMAL
BACTERIA BLD CULT: NORMAL

## 2024-03-23 LAB — ELASTASE 1, FECAL: 87 MCG/G

## 2024-03-27 LAB — H PYLORI AG STL QL IA: NOT DETECTED

## 2024-05-20 NOTE — ED PROVIDER NOTES
"Pharmacy dosing service  Anticoagulant  Warfarin     Subjective:    Laura Mejia is a 67 y.o.female being continued on warfarin for Atrial Fibrillation / Flutter.    INR Goal: 2 - 3  Home medication?: warfarin 3 mg PO daily, except warfarin 6 mg PO on Mon/Wed/Fri.   Bridge Therapy Present?:  No  Interacting Medications Evaluation (New/Present/Discontinued):   Home medications: aspirin and levothyroxine  New inpatient medications: ceftriaxone (increase anticoagulation effect)       Assessment/Plan:    INR is subtherapeutic today however INR is increasing appropriately after receiving bolus dose on 5/17. Will continue with home dosing and plan to give warfarin 6 mg today.     Continue to monitor and adjust based on INR.         Date 5/17 5/18 5/19 5/20        INR 1.10 1.05 1.42 1.75        Dose 9 mg 3 mg 3 mg 6 mg            Objective:  [Ht: 175.5 cm (69.09\"); Wt: 87.8 kg (193 lb 9 oz); BMI: Body mass index is 28.51 kg/m².]    Lab Results   Component Value Date    ALBUMIN 3.0 (L) 05/19/2024     Lab Results   Component Value Date    INR 1.75 (L) 05/19/2024    INR 1.42 (L) 05/19/2024    INR 1.05 (L) 05/18/2024    PROTIME 18.3 (L) 05/19/2024    PROTIME 15.1 (L) 05/19/2024    PROTIME 11.4 (L) 05/18/2024     Lab Results   Component Value Date    HGB 11.0 (L) 05/19/2024    HGB 12.8 05/19/2024    HGB 11.9 (L) 05/18/2024     Lab Results   Component Value Date    HCT 34.8 05/19/2024    HCT 41.6 05/19/2024    HCT 36.4 05/18/2024       Yenifer Ascencio, PharmMIREYA  05/20/24 09:43 EDT             " Encounter Date: 1/25/2022       History     Chief Complaint   Patient presents with    Slurred Speech     . Slurred speech, left facial droop, and left sided weakness. No drift. Recently dx for a stroke, no deficits at that time.     The history is provided by medical records and the EMS personnel. The history is limited by the condition of the patient.      Kamar Muñoz is a 76-year-old male with a history of CAD, type 2 diabetes, hyperlipidemia, hypertension, history of neuropathy, history of arthritis presenting with slurred speech, left facial droop and left-sided weakness.  He presents via EMS after EMS was called for evaluation for vomiting and altered mental status.  On the scene they evaluated him found him to have slurred speech, left-sided weakness and left facial droop.  His blood sugar at the time was greater than 400. Of note, patient was recently evaluated for stroke, and discharged with no focal deficits.  His last well known was approximately 8:00 a.m..  On arrival by EMS, he was able to converse however since his arrival to the ED, patient unable to provide any history, unable to converse, has left-sided facial droop and generalized weakness with left worse than right.  History is obtained through EMS.      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 8/2/2012    Type II or unspecified type diabetes mellitus with neurological manifestations, uncontrolled(250.62) 3/8/2013    Urinary tract infection      Past Surgical History:   Procedure Laterality Date    BACK SURGERY      CATARACT EXTRACTION W/  INTRAOCULAR LENS IMPLANT Right     Per Dr Romero note 11/2018     COLONOSCOPY N/A 2019    Procedure: COLONOSCOPY Suprep;  Surgeon: Anh Johnson MD;  Location: [facility] ENDO;  Service: Endoscopy;  Laterality: N/A;    EYE SURGERY      HERNIA REPAIR      LEFT HEART CATHETERIZATION Left 2022    Procedure: CATHETERIZATION, HEART, LEFT;  Surgeon: Will Hurst III, MD;  Location: [facility] CATH LAB/EP;  Service: Cardiology;  Laterality: Left;    renal stones      SHOULDER OPEN ROTATOR CUFF REPAIR       Family History   Problem Relation Age of Onset    Prostate cancer Neg Hx     Kidney disease Neg Hx      Social History     Tobacco Use    Smoking status: Former Smoker     Packs/day: 1.50     Years: 25.00     Pack years: 37.50     Quit date: 1983     Years since quittin.0    Smokeless tobacco: Never Used   Substance Use Topics    Alcohol use: No    Drug use: No     Review of Systems   Unable to perform ROS: Mental status change       Physical Exam     Initial Vitals   BP Pulse Resp Temp SpO2   22 1109 22 1109 22 1109 22 1454 22 1109   (!) 153/100 95 (!) 22 96.4 °F (35.8 °C) 97 %      MAP       --                Physical Exam    Nursing note and vitals reviewed.      Gen/Constitutional:  Appears altered, confused, unable to speak, slurred speech, left-sided facial droop and weakness; chronically ill-appearing  Head: Normocephalic, Atraumatic  Neck: supple, no masses or LAD, no JVD  Eyes: PERRLA, conjunctiva clear  Ears, Nose and Throat: No rhinorrhea or stridor.  Cardiac:  Regular rate, Reg Rhythm, No murmur  Pulmonary: CTA Bilat, no wheezes, rhonchi, rales.  No increased work of breathing.  GI: Abdomen soft, non-tender, non-distended; no rebound or guarding  : No CVA tenderness.  Musculoskeletal: Extremities warm, well perfused, no erythema, no edema  Skin: No rashes, cyanosis or jaundice.  Neuro:  Appears altered and confused, unable to speak, slurred speech when attempts, left-sided facial droop, following commands with  eyes open.  Gait not assessed.  Psych: Normal affect      ED Course   Critical Care    Date/Time: 1/25/2022 12:45 PM  Performed by: Aly Mclean DO  Authorized by: Aly Mclean DO   Direct patient critical care time: 25 minutes  Additional history critical care time: 25 minutes  Ordering / reviewing critical care time: 20 minutes  Documentation critical care time: 10 minutes  Consulting other physicians critical care time: 15 minutes  Consult with family critical care time: 15 minutes  Total critical care time (exclusive of procedural time) : 110 minutes  Critical care was necessary to treat or prevent imminent or life-threatening deterioration of the following conditions: endocrine crisis, metabolic crisis and CNS failure or compromise.  Critical care was time spent personally by me on the following activities: blood draw for specimens, development of treatment plan with patient or surrogate, discussions with consultants, evaluation of patient's response to treatment, examination of patient, obtaining history from patient or surrogate, ordering and performing treatments and interventions, ordering and review of laboratory studies, ordering and review of radiographic studies, pulse oximetry, re-evaluation of patient's condition and review of old charts.        Labs Reviewed   CBC W/ AUTO DIFFERENTIAL - Abnormal; Notable for the following components:       Result Value    WBC 15.48 (*)     Hemoglobin 13.6 (*)     MCHC 29.9 (*)     RDW 14.7 (*)     Gran # (ANC) 12.8 (*)     Immature Grans (Abs) 0.07 (*)     Gran % 82.5 (*)     Lymph % 12.9 (*)     Mono % 3.7 (*)     All other components within normal limits   COMPREHENSIVE METABOLIC PANEL - Abnormal; Notable for the following components:    Potassium 6.2 (*)     CO2 5 (*)     Glucose 831 (*)     BUN 40 (*)     Creatinine 3.2 (*)     Albumin 3.0 (*)     Anion Gap 35 (*)     eGFR if  20.3 (*)     eGFR if non  17.6 (*)     All  other components within normal limits   LIPID PANEL - Abnormal; Notable for the following components:    Triglycerides 215 (*)     All other components within normal limits   TROPONIN I - Abnormal; Notable for the following components:    Troponin I 0.143 (*)     All other components within normal limits   B-TYPE NATRIURETIC PEPTIDE - Abnormal; Notable for the following components:     (*)     All other components within normal limits   BETA - HYDROXYBUTYRATE, SERUM - Abnormal; Notable for the following components:    Beta-Hydroxybutyrate 4.8 (*)     All other components within normal limits   POCT GLUCOSE - Abnormal; Notable for the following components:    POCT Glucose >500 (*)     All other components within normal limits   ISTAT PROCEDURE - Abnormal; Notable for the following components:    POC PTWBT 20.7 (*)     POC PTINR 1.8 (*)     All other components within normal limits   ISTAT CREATININE - Abnormal; Notable for the following components:    POC Creatinine 2.5 (*)     All other components within normal limits   ISTAT PROCEDURE - Abnormal; Notable for the following components:    POC PH 7.011 (*)     POC PCO2 17.2 (*)     POC PO2 80 (*)     POC HCO3 4.3 (*)     POC SATURATED O2 88 (*)     POC Sodium 133 (*)     POC Potassium 6.3 (*)     POC TCO2 <5 (*)     All other components within normal limits   ISTAT PROCEDURE - Abnormal; Notable for the following components:    POC Glucose >700 (*)     POC BUN 43 (*)     POC Creatinine 2.5 (*)     POC Sodium 132 (*)     POC Potassium 6.2 (*)     POC TCO2 (MEASURED) 7 (*)     All other components within normal limits   POCT GLUCOSE - Abnormal; Notable for the following components:    POCT Glucose >500 (*)     All other components within normal limits   SARS-COV-2 RDRP GENE - Normal    Narrative:     This test utilizes isothermal nucleic acid amplification   technology to detect the SARS-CoV-2 RdRp nucleic acid segment.   The analytical sensitivity (limit of  detection) is 125 genome   equivalents/mL.   A POSITIVE result implies infection with the SARS-CoV-2 virus;   the patient is presumed to be contagious.     A NEGATIVE result means that SARS-CoV-2 nucleic acids are not   present above the limit of detection. A NEGATIVE result should be   treated as presumptive. It does not rule out the possibility of   COVID-19 and should not be the sole basis for treatment decisions.   If COVID-19 is strongly suspected based on clinical and exposure   history, re-testing using an alternate molecular assay should be   considered.   This test is only for use under the Food and Drug   Administration s Emergency Use Authorization (EUA).   Commercial kits are provided by [employer].   Performance characteristics of the EUA have been independently   verified by [facility] Department of   Pathology and Laboratory Medicine.   _________________________________________________________________   The authorized Fact Sheet for Healthcare Providers and the authorized Fact   Sheet for Patients of the ID NOW COVID-19 are available on the FDA   website:     https://www.fda.gov/media/552907/download  https://www.fda.gov/media/184998/download       CULTURE, BLOOD   CULTURE, BLOOD   TSH   MAGNESIUM   URINALYSIS, REFLEX TO URINE CULTURE   BASIC METABOLIC PANEL   BASIC METABOLIC PANEL   LACTIC ACID, PLASMA   POCT GLUCOSE, HAND-HELD DEVICE   ISTAT CHEM8   POCT GLUCOSE MONITORING CONTINUOUS     EKG Readings: (Independently Interpreted)   Initial Reading: No STEMI. Rhythm: Sinus Tachycardia. Heart Rate: 103. Ectopy: No Ectopy. ST Segments: Non-Specific ST Segment Depression.   T-wave abnormalities in the inferior lateral leads     ECG Results          ECG 12 lead (In process)  Result time 01/25/22 12:34:40    In process by Interface, Lab In [facility] (01/25/22 12:34:40)                 Narrative:    Test Reason : I63.9,    Vent. Rate : 103 BPM     Atrial Rate : 103 BPM     P-R Int : 224 ms           QRS Dur : 140 ms      QT Int : 338 ms       P-R-T Axes : 078 051 -63 degrees     QTc Int : 442 ms    Sinus tachycardia with 1st degree A-V block  Nonspecific intraventricular block  Cannot rule out Anterior infarct ,age undetermined  T wave abnormality, consider inferolateral ischemia  Abnormal ECG  When compared with ECG of 14-JAN-2022 08:58,  Significant changes have occurred    Referred By: [email or web address]   SELF           Confirmed By:                             Imaging Results          X-ray chest AP portable (Final result)  Result time 01/25/22 14:35:04    Final result by Oz Guerra MD (01/25/22 14:35:04)                 Impression:      See above      Electronically signed by: Oz Guerra MD  Date:    01/25/2022  Time:    14:35             Narrative:    EXAMINATION:  XR CHEST AP PORTABLE    CLINICAL HISTORY:  evaluate for infection as cause of DKA;    TECHNIQUE:  Single frontal view of the chest was performed.    COMPARISON:  01/12/2022    FINDINGS:  Cardiac size is normal.  Lungs are clear and no evidence of infiltrate can be seen.                               MRA Neck without contrast (Final result)  Result time 01/25/22 12:20:58    Final result by Cresencio Hoang MD (01/25/22 12:20:58)                 Impression:      Examination degraded by patient motion artifact.    Moderate-sized subacute right frontal and left inferior cerebellar infarcts with associated petechial hemorrhage.  Overlying sulcal FLAIR hyperintensity likely reflecting some associated localized subarachnoid blood products.    No new hemorrhage or infarct identified elsewhere.    MRA of the cervical and intracranial vasculature appears grossly stable from prior exam allowing for motion artifact.  No new high-grade stenosis or large vessel occlusion.      Electronically signed by: Cresencio Hoang MD  Date:    01/25/2022  Time:    12:20             Narrative:    EXAMINATION:  MRI BRAIN ISCHEMIC INTERVENTIONAL PROTOCOL  INCL MRA W/O CONTRAST; MRA NECK WITHOUT CONTRAST    CLINICAL HISTORY:  LSW, L facial droop;; LSW;    TECHNIQUE:  Multiplanar multisequence MR imaging of the brain was performed without intravenous contrast.  Limited sequences obtained per acute intervention protocol.  These include axial diffusion, axial heme, axial FLAIR.    Obtained as a separate acquisition from the MRI brain, 3D time-of-flight noncontrast MR angiogram of the intracranial vasculature with multiple MIP reconstructions.    Examination degraded by patient motion artifact.    COMPARISON:  MRI MRA 01/13/2021    FINDINGS:  Brain:    Moderate-sized subacute infarcts in the right frontal lobe (superior division MCA distribution) in the inferolateral left cerebellar hemisphere (posteroinferior cerebellar artery distribution).  There is associated petechial type hemorrhage, particularly throughout the right frontal region.  This appears similar to the prior., without evidence of a new focal hematoma associated mass effect also similar to prior.    The other punctate foci of diffusion restriction (right occipital and right cerebellar) evident on the prior exam are no longer apparent.  No new diffusion restricting foci to suggest acute infarction.  Evidence of new parenchymal hemorrhage, edema or mass effect.    Ventricles are normal in size for age without evidence of hydrocephalus.    There is some FLAIR hyperintensity within the right frontal sulci overlying the region of infarction likely reflecting trace subarachnoid hemorrhage associated with the petechial parenchymal hemorrhage at this site.  Similar findings associated with the left cerebellar infarct.  No extra-axial blood or fluid collections evident elsewhere.    MRA:    Common carotid arteries normal in caliber.  No dephasing artifact at the carotid bifurcations likely related to non laminar flow.  No definite high-grade stenosis at this site.  The internal carotid arteries are normal in  caliber.    Left vertebral artery mildly developmentally hypoplastic.  No focal stenosis of either cervical vertebral artery.  The vertebrobasilar system is unremarkable.    The proximal ACAs, MCAs and PCAs appear within normal limits.  No new high-grade stenosis, major branch occlusion, or intracranial aneurysm identified.                               MRI Brain Ischemic Inter Pro Incl MRA W/O Con (Final result)  Result time 01/25/22 12:20:58    Final result by Cresencio Hoang MD (01/25/22 12:20:58)                 Impression:      Examination degraded by patient motion artifact.    Moderate-sized subacute right frontal and left inferior cerebellar infarcts with associated petechial hemorrhage.  Overlying sulcal FLAIR hyperintensity likely reflecting some associated localized subarachnoid blood products.    No new hemorrhage or infarct identified elsewhere.    MRA of the cervical and intracranial vasculature appears grossly stable from prior exam allowing for motion artifact.  No new high-grade stenosis or large vessel occlusion.      Electronically signed by: Cresencio Hoang MD  Date:    01/25/2022  Time:    12:20             Narrative:    EXAMINATION:  MRI BRAIN ISCHEMIC INTERVENTIONAL PROTOCOL INCL MRA W/O CONTRAST; MRA NECK WITHOUT CONTRAST    CLINICAL HISTORY:  LSW, L facial droop;; LSW;    TECHNIQUE:  Multiplanar multisequence MR imaging of the brain was performed without intravenous contrast.  Limited sequences obtained per acute intervention protocol.  These include axial diffusion, axial heme, axial FLAIR.    Obtained as a separate acquisition from the MRI brain, 3D time-of-flight noncontrast MR angiogram of the intracranial vasculature with multiple MIP reconstructions.    Examination degraded by patient motion artifact.    COMPARISON:  MRI MRA 01/13/2021    FINDINGS:  Brain:    Moderate-sized subacute infarcts in the right frontal lobe (superior division MCA distribution) in the inferolateral left  cerebellar hemisphere (posteroinferior cerebellar artery distribution).  There is associated petechial type hemorrhage, particularly throughout the right frontal region.  This appears similar to the prior., without evidence of a new focal hematoma associated mass effect also similar to prior.    The other punctate foci of diffusion restriction (right occipital and right cerebellar) evident on the prior exam are no longer apparent.  No new diffusion restricting foci to suggest acute infarction.  Evidence of new parenchymal hemorrhage, edema or mass effect.    Ventricles are normal in size for age without evidence of hydrocephalus.    There is some FLAIR hyperintensity within the right frontal sulci overlying the region of infarction likely reflecting trace subarachnoid hemorrhage associated with the petechial parenchymal hemorrhage at this site.  Similar findings associated with the left cerebellar infarct.  No extra-axial blood or fluid collections evident elsewhere.    MRA:    Common carotid arteries normal in caliber.  No dephasing artifact at the carotid bifurcations likely related to non laminar flow.  No definite high-grade stenosis at this site.  The internal carotid arteries are normal in caliber.    Left vertebral artery mildly developmentally hypoplastic.  No focal stenosis of either cervical vertebral artery.  The vertebrobasilar system is unremarkable.    The proximal ACAs, MCAs and PCAs appear within normal limits.  No new high-grade stenosis, major branch occlusion, or intracranial aneurysm identified.                               CT Head Without Contrast (Final result)  Result time 01/25/22 11:36:05   Procedure changed from CTA STROKE MULTI-PHASE     Final result by Cresencio Hoang MD (01/25/22 11:36:05)                 Impression:      Examination moderately degraded by patient motion artifact.    Evolving moderate-sized areas of recent infarction in the right frontal lobe and left cerebellum as  above.    No definite new hemorrhage or major vascular distribution infarct.    Further evaluation as warranted clinically.      Electronically signed by: Cresencio Hoang MD  Date:    01/25/2022  Time:    11:36             Narrative:    EXAMINATION:  CT HEAD WITHOUT CONTRAST    CLINICAL HISTORY:  Neuro deficit, acute, stroke suspected;    TECHNIQUE:  Low dose axial CT images obtained throughout the head without the use of intravenous contrast.  Axial, sagittal and coronal reconstructions were performed.    Examination moderately degraded by patient motion artifact.    COMPARISON:  MRI MRA 01/13/2021, CT 01/12/2021    FINDINGS:  Moderate-sized areas of recent infarction in the right frontal lobe (superior division right MCA distribution) and the left cerebellum (posteroinferior cerebellar artery distribution) with small amount interspersed hemorrhage.  The infarct territories are grossly stable from recent exam without significant infarct extension.  Associated hemorrhage less conspicuous than the prior MRI allowing for variation in modality.  No new focal hematoma.  Modest right frontal mass effect on today's exam with some localized sulcal crowding.  No subfalcine or transtentorial herniation.  No significant cerebellar mass effect at this time.  No evidence of 4th ventricular compression/obstructive hydrocephalus.    No new territorial infarct or parenchymal hemorrhage elsewhere.    No new extra-axial blood or fluid collections.    Ventricles are stable in size.  Slight leftward bowing of septum pellucidum.    No displaced calvarial fracture.    The mastoid air cells and visualized paranasal sinuses are essentially clear.                                 Medications   dextrose 50% injection 25 g (has no administration in time range)   dextrose 10 % infusion (has no administration in time range)   dextrose 50% injection 12.5 g (has no administration in time range)   dextrose 10 % infusion (has no administration in  "time range)   insulin regular in 0.9 % NaCl 100 unit/100 mL (1 unit/mL) infusion (6 Units/hr Intravenous Rate/Dose Change 1/25/22 1251)   sodium chloride 0.9% flush 10 mL (has no administration in time range)   ondansetron injection 4 mg (has no administration in time range)   sodium chloride 0.9% bolus 1,000 mL (0 mLs Intravenous Stopped 1/25/22 1340)     Medical Decision Making:   History:   I obtained history from: EMS provider.       <> Summary of History: EMS initially brought in the patient for a stroke evaluation from the field, blood sugar of 466  Old Medical Records: I decided to obtain old medical records.  Initial Assessment:   Kamar Muñoz is a 76-year-old male with a history of CAD, type 2 diabetes, hyperlipidemia, hypertension, history of neuropathy, history of arthritis presenting with slurred speech, left facial droop and left-sided weakness.  Differential Diagnosis:   Stroke, hypoglycemia, hyperglycemia, DKA, infectious etiology  Independently Interpreted Test(s):   I have ordered and independently interpreted X-rays - see prior notes.  I have ordered and independently interpreted EKG Reading(s) - see prior notes  Clinical Tests:   Lab Tests: Ordered and Reviewed  Radiological Study: Ordered and Reviewed  Medical Tests: Ordered and Reviewed  Sepsis Perfusion Assessment: "I attest a sepsis perfusion exam was performed within 6 hours of sepsis, severe sepsis, or septic shock presentation, following fluid resuscitation."  Other:   I have discussed this case with another health care provider.       <> Summary of the Discussion: Vascular Neurology and Critical Care Medicine    Emergent evaluation patient presenting with altered mental status.  Initially brought in as a stroke evaluation by EMS through the EMS [place].  Vital signs are tachycardia and tachypnea.  Patient was taken immediately to CT scan for evaluation.  I evaluated the patient at the CT scan who had profound weakness, appeared altered, " but no gaze alteration.  Pupils were 3 mm, reactive, and patient was following commands.  He was unable to speak appropriately.  GCS 13. Blood sugar via EMS was 466. Repeat blood sugar shows greater than 500. CT head obtained immediately and evaluated by the stroke team as a stroke code was initiated upon arrival.  Per stroke team, CT head and MRI brain was obtained.  CT head with no acute findings, shows stroke evolution of recently diagnosed stroke.  MRI shows similar findings with no acute stroke.  For repeat blood sugar shows greater than 500, with venous blood gas showed a pH of 7.0 with a bicarb of 4. Potassium 6.2.  Given concern for DKA and hyperglycemia altered mental status, patient was placed on insulin drip, and case was discussed with critical care medicine.  Patient does have a history of diabetes, unclear etiology of the source of his DKA.  Beta hydroxybutyrate elevated to 4.8.  Will initiate insulin management including IV fluids and admitted to Critical Care Medicine.  Please see critical care note for critical care time.  Patient family agreeable to admission plan.    Complexity:  Critical care                    Clinical Impression:   Final diagnoses:  [I63.9] Stroke  [R41.82] Altered mental status, unspecified altered mental status type (Primary)  [R73.9] Hyperglycemia  [E11.10] Diabetic ketoacidosis without coma associated with type 2 diabetes mellitus          ED Disposition Condition    Admit             Aly Mclean DO, FAAEM  Emergency Staff Physician   Dept of Emergency Medicine   [facility]  Spectralink: [id number]        Disclaimer: This note has been generated using voice-recognition software. There may be typographical errors that have been missed during proof-reading.       Aly Mclean DO  01/25/22 [id number]

## 2024-06-12 NOTE — ASSESSMENT & PLAN NOTE
Hx of CVA in Jan 2022. CT Head with evolving infarcts in right frontal and left cerebellar hemispheres.   Has had issues with memory per family since CVA   No concern for acute stroke this admit per discussion with Radiology.   - Continue home antiplatelets, statin and Eliquis   Judd Patterson, MS, RDN, CDN, CNSC (ex. [id number])

## 2024-06-17 PROBLEM — B37.49 CANDIDURIA: Status: RESOLVED | Noted: 2024-03-17 | Resolved: 2024-06-17

## 2024-06-19 NOTE — SUBJECTIVE & OBJECTIVE
[facility] Adult  Hospitalist Group                                                                                          Hospitalist Progress Note  NICOLE White - CNP  Office Phone: [phone]        Date of Service:  2024  NAME:  Xuan Powell  :  2003  MRN:  [id number]       Admission Summary:   Xuan Powell 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 8/2/2012    Type II or unspecified type diabetes mellitus with neurological manifestations, uncontrolled(250.62) 3/8/2013    Urinary tract infection        Past Surgical History:   Procedure Laterality Date    BACK SURGERY      CATARACT EXTRACTION W/  INTRAOCULAR LENS IMPLANT Right     Per Dr Romero note 11/2018    COLONOSCOPY N/A 1/28/2019    Procedure: COLONOSCOPY Suprep;  Surgeon: Anh Johnson MD;  Location: [facility] ENDO;  Service: Endoscopy;  Laterality: N/A;    EYE SURGERY      HERNIA REPAIR      LEFT HEART CATHETERIZATION Left 1/9/2022    Procedure: CATHETERIZATION, HEART, LEFT;  Surgeon: Will Hurst III, MD;  Location: [facility] CATH LAB/EP;  Service: Cardiology;  Laterality: Left;    renal stones      SHOULDER OPEN ROTATOR CUFF REPAIR         Review of patient's allergies indicates:   Allergen Reactions    Iodine      Other reaction(s): swelling  Other reaction(s): Itching  Other reaction(s): Rash       No current facility-administered medications on file prior to encounter.     Current Outpatient Medications on File Prior to Encounter   Medication Sig    aspirin (ECOTRIN) 81 MG EC tablet Take 81 mg by mouth once daily.    atorvastatin (LIPITOR) 40 MG tablet Take 1 tablet (40 mg total) by mouth once daily.    diltiaZEM (CARDIZEM CD) 120 MG Cp24 Take 1 capsule (120 mg total) by mouth once daily.    gabapentin (NEURONTIN) 300 MG capsule Take 1 capsule (300 mg total) by mouth 2 (two) times daily. (Patient taking differently: Take 300 mg by mouth 2 (two) times daily. Takes nightly)    insulin aspart U-100 (NOVOLOG FLEXPEN U-100 INSULIN) 100 unit/mL (3 mL) InPn pen Inject 17 Units into the skin 3 (three) times daily with meals.    insulin glargine (LANTUS) 100 unit/mL injection Inject  "28 Units into the skin every evening.    losartan (COZAAR) 25 MG tablet Take 1 tablet (25 mg total) by mouth once daily.    metFORMIN (GLUCOPHAGE) 500 MG tablet Take 1 tablet (500 mg total) by mouth 2 (two) times daily with meals.    metoprolol succinate (TOPROL-XL) 25 MG 24 hr tablet Take 1 tablet (25 mg total) by mouth once daily.    OMEPRAZOLE (PRILOSEC ORAL) Take 1 tablet by mouth daily as needed.     BD ULTRA-FINE SHORT PEN NEEDLE 31 gauge x 5/16" Ndle 3 (three) times daily.    blood sugar diagnostic Strp 1 strip by Misc.(Non-Drug; Combo Route) route 2 (two) times a day.    diclofenac sodium (VOLTAREN) 1 % Gel APPLY 2 GRAMS TO AFFECTED AREA ONCE D    ergocalciferol (ERGOCALCIFEROL) 50,000 unit Cap     glucagon, human recombinant, (GLUCAGON EMERGENCY KIT, HUMAN,) 1 mg SolR Inject 1 mg into the muscle as needed.    hydrocortisone 2.5 % cream APPLY TO GROIN RASH BID NO LONGER THAN 7 DAYS    ketoconazole (NIZORAL) 2 % cream APPLY TO GROIN RASH BID NO LONGER THAN 7 DAYS    lancets (ONETOUCH ULTRASOFT LANCETS) Misc 1 lancet by Misc.(Non-Drug; Combo Route) route 2 (two) times a day.    MULTIVITAMIN ORAL Take 1 tablet by mouth once daily.     nitroGLYCERIN (NITROSTAT) 0.4 MG SL tablet Place 1 tablet (0.4 mg total) under the tongue every 5 (five) minutes as needed for Chest pain.    pen needle, diabetic (PEN NEEDLE) 30 gauge x 5/16" Ndle 1 Units by Misc.(Non-Drug; Combo Route) route 3 (three) times daily.    polycarbophil (FIBERCON) 625 mg tablet Take 1 tablet by mouth once daily.     ticagrelor (BRILINTA) 90 mg tablet Take 1 tablet (90 mg total) by mouth 2 (two) times a day.     Family History    None       Tobacco Use    Smoking status: Former Smoker     Packs/day: 1.50     Years: 25.00     Pack years: 37.50     Quit date: 1983     Years since quittin.0    Smokeless tobacco: Never Used   Substance and Sexual Activity    Alcohol use: No    Drug use: No    Sexual activity: Yes     " Partners: Female     Review of Systems   Constitutional: Negative for chills, decreased appetite, diaphoresis, fever, malaise/fatigue, weight gain and weight loss.   Cardiovascular: Negative for chest pain, claudication, dyspnea on exertion, irregular heartbeat, leg swelling, near-syncope, orthopnea, palpitations and paroxysmal nocturnal dyspnea.   Respiratory: Negative for cough, shortness of breath, snoring, sputum production and wheezing.    Endocrine: Negative for cold intolerance, heat intolerance, polydipsia, polyphagia and polyuria.   Skin: Negative for color change, dry skin, itching, nail changes and poor wound healing.   Musculoskeletal: Negative for back pain, gout, joint pain and joint swelling.   Gastrointestinal: Negative for bloating, abdominal pain, constipation, diarrhea, hematemesis, hematochezia, melena, nausea and vomiting.   Genitourinary: Negative for dysuria, hematuria and nocturia.   Neurological: Negative for dizziness, headaches, light-headedness, numbness, paresthesias and weakness.   Psychiatric/Behavioral: Negative for altered mental status, depression and memory loss.     Objective:     Vital Signs (Most Recent):  Temp: 97.2 °F (36.2 °C) (01/13/22 1130)  Pulse: 87 (01/13/22 1400)  Resp: 20 (01/13/22 1400)  BP: (!) 160/77 (01/13/22 1400)  SpO2: (!) 94 % (01/13/22 1400) Vital Signs (24h Range):  Temp:  [97.2 °F (36.2 °C)-100.9 °F (38.3 °C)] 97.2 °F (36.2 °C)  Pulse:  [] 87  Resp:  [18-32] 20  SpO2:  [90 %-97 %] 94 %  BP: (110-175)/(59-95) 160/77     Weight: 90.3 kg (199 lb)  Body mass index is 26.25 kg/m².    SpO2: (!) 94 %  O2 Device (Oxygen Therapy): room air      Intake/Output Summary (Last 24 hours) at 1/13/2022 1459  Last data filed at 1/13/2022 0621  Gross per 24 hour   Intake 3048.09 ml   Output 950 ml   Net 2098.09 ml       Lines/Drains/Airways     Drain                 Urethral Catheter 01/13/22 0606 14 Fr. <1 day          Arterial Line            Arterial Line -- days           Peripheral Intravenous Line                 Peripheral IV - Single Lumen 01/12/22 1058 18 G Right Antecubital 1 day         Peripheral IV - Single Lumen 01/12/22 20 G Left Antecubital 1 day                Physical Exam  Constitutional:       General: He is not in acute distress.     Appearance: He is well-developed and well-nourished.   Cardiovascular:      Rate and Rhythm: Normal rate and regular rhythm.      Heart sounds: No murmur heard.  No gallop.    Pulmonary:      Effort: Pulmonary effort is normal. No respiratory distress.      Breath sounds: Normal breath sounds. No wheezing.   Abdominal:      General: Bowel sounds are normal. There is no distension.      Palpations: Abdomen is soft.      Tenderness: There is no abdominal tenderness.   Skin:     General: Skin is warm and dry.   Neurological:      Mental Status: He is alert and oriented to person, place, and time.         Significant Labs:   BMP:   Recent Labs   Lab 01/13/22  0358 01/13/22  0815 01/13/22  1225   *  250* 225* 186*     142 143 143   K 4.7  4.5 4.2 3.9     109 110 109   CO2 18*  20* 19* 23   BUN 41*  41* 38* 37*   CREATININE 1.4  1.5* 1.4 1.3   CALCIUM 8.0*  8.1* 8.1* 8.5*   MG 2.1  --   --    , CBC   Recent Labs   Lab 01/12/22  1057 01/12/22  1407 01/13/22  0359   WBC 10.72  --  14.69*   HGB 13.6*  --  12.6*   HCT 41.3   < > 38.9*     --  237    < > = values in this interval not displayed.    and Troponin   Recent Labs   Lab 01/13/22  0358   TROPONINI 12.121*       Significant Imaging: Echocardiogram:   Transthoracic echo (TTE) complete (Cupid Only):   Results for orders placed or performed during the hospital encounter of 01/12/22   Echo Saline Bubble? Yes   Result Value Ref Range    BSA 2.16 m2    QEF 44 %

## 2024-11-12 NOTE — PT/OT/SLP EVAL
Occupational Therapy   Co-Evaluation/Treatment with PT    Name: Kamar Muñoz  MRN: [id number]  Admitting Diagnosis:  Encephalopathy, metabolic  Recent Surgery: * No surgery found *    *co-evaluation/treatment with PT 2* decreased activity tolerance due to COVID-19    Recommendations:     Discharge Recommendations: rehabilitation facility (return to)  Discharge Equipment Recommendations:   (TBD)  Barriers to discharge:  None    Assessment:     Kamar Muñoz is a 76 y.o. male with a medical diagnosis of Encephalopathy, metabolic.  He presents with confusion, oriented to self, place, and time. Pt with decreased endurance and globalized weakness. Pt admitted to [facility] following fall at inpatient rehab on 2/17/22. Pt limited by fatigue and dizziness upon sitting EOB. Performance deficits affecting function: weakness, impaired endurance, impaired self care skills, impaired functional mobilty, gait instability, impaired balance, decreased upper extremity function, decreased lower extremity function, decreased coordination, impaired cognition, decreased safety awareness, impaired cardiopulmonary response to activity. Pt would benefit from skilled OT services in order to maximize independence with ADLs and facilitate safe discharge. Because of patient's significant decline from PLOF, patient would benefit from returning to Inpatient Rehab to maximize return to PLOF. Pt is an excellent candidate for inpatient rehabilitation, as he has a qualifying diagnosis, displays good activity tolerance, has experienced decline from PLOF, has good family support, and is motivated to participate in therapy.        Rehab Prognosis: Good; patient would benefit from acute skilled OT services to address these deficits and reach maximum level of function.       Plan:     Patient to be seen 4 x/week to address the above listed problems via self-care/home management, therapeutic activities, neuromuscular re-education, therapeutic  "exercises  · Plan of Care Expires: 03/22/22  · Plan of Care Reviewed with: patient    Subjective     Chief Complaint: "I want to lie back down"  Patient/Family Comments/goals: to return home    Occupational Profile:  Living Environment: Pt lives with his spouse in a H with 1 CORINNA. Pt has a walk-in shower with a built in seat  Previous level of function: prior to CVA, pt was independent. Pt was admitted from rehab following a fall where he reports working on ambulating with a RW  Equipment Used at Home:  walker, rolling (walk-in shower with a built-in seat)  Assistance upon Discharge: Upon discharge, pt will have limited assistance from family    Pain/Comfort:  · Pain Rating 1: 0/10  · Pain Rating Post-Intervention 1: 0/10    Patients cultural, spiritual, Rastafari conflicts given the current situation: no    Objective:     Communicated with: RN and PT prior to session.  Patient found HOB elevated with telemetry, pulse ox (continuous), oxygen, lopez catheter upon OT entry to room.    General Precautions: Standard, fall, airborne, contact, droplet   Orthopedic Precautions:N/A   Braces: N/A  Respiratory Status: Nasal cannula, flow 1 L/min    Occupational Performance:    Bed Mobility:    · Patient completed Supine to Sit with maximal assistance and 2 persons  · Patient completed Sit to Supine with minimum assistance    Functional Mobility/Transfers:  · Patient completed Sit <> Stand Transfer with moderate assistance  with  hand-held assist   · Mod (A) static stand for pericare  · Functional Mobility: unable     Activities of Daily Living:  · Toileting: total assistance posterior pericare and chuckpad change while in standing    Cognitive/Visual Perceptual:  Cognitive/Psychosocial Skills:     -       Oriented to: Person, Place and Time   -       Follows Commands/attention:Follows two-step commands  -       Communication: clear/fluent  -       Memory: Poor immediate recall  -       Safety awareness/insight to " disability: impaired   -       Mood/Affect/Coping skills/emotional control: Appropriate to situation    Physical Exam:  Sensation:    -       Intact  Upper Extremity Range of Motion:     -       Right Upper Extremity: WFL  -       Left Upper Extremity: WFL  Upper Extremity Strength:    -       Right Upper Extremity: WFL  -       Left Upper Extremity: WFL   Strength:    -       Right Upper Extremity: WFL  -       Left Upper Extremity: WFL  Fine Motor Coordination:    -       Intact   R hand dominant    AMPAC 6 Click ADL:  AMPAC Total Score: 11    Treatment & Education:  -Therapist provided facilitation and instruction of proper body mechanics, energy conservation, and fall prevention strategies during tasks listed above.  -Pt educated on role of OT, POC and goals for therapy  -Pt verbalized understanding. Pt expressed no further concerns/questions  -Whiteboard updated  Education:    Patient left right sidelying with all lines intact, call button in reach and RN notified    GOALS:   Multidisciplinary Problems     Occupational Therapy Goals        Problem: Occupational Therapy Goal    Goal Priority Disciplines Outcome Interventions   Occupational Therapy Goal     OT, PT/OT Ongoing, Progressing    Description: Goals to be met by: 3/6/22     Patient will increase functional independence with ADLs by performing:    Feeding with [place].  UE Dressing with Stand-by Assistance.  LE Dressing with Minimal Assistance.  Grooming while standing with Stand-by Assistance.  Toileting from bedside commode with Stand-by Assistance for hygiene and clothing management.   Toilet transfer to bedside commode with Stand-by Assistance.                     History:     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 8/2/2012    STEMI involving right coronary artery  1/9/2022    Type II or unspecified type diabetes mellitus with neurological manifestations, uncontrolled(250.62) 3/8/2013    Urinary tract infection        Past Surgical History:   Procedure Laterality Date    BACK SURGERY      CATARACT EXTRACTION W/  INTRAOCULAR LENS IMPLANT Right     Per Dr Romero note 11/2018    COLONOSCOPY N/A 1/28/2019    Procedure: COLONOSCOPY Suprep;  Surgeon: Anh Johnson MD;  Location: [facility] ENDO;  Service: Endoscopy;  Laterality: N/A;    EYE SURGERY      HERNIA REPAIR      LEFT HEART CATHETERIZATION Left 1/9/2022    Procedure: CATHETERIZATION, HEART, LEFT;  Surgeon: Will Hurst III, MD;  Location: [facility] CATH LAB/EP;  Service: Cardiology;  Laterality: Left;    renal stones      SHOULDER OPEN ROTATOR CUFF REPAIR         Time Tracking:     OT Date of Treatment: 02/20/22  OT Start Time: 0928  OT Stop Time: 0947  OT Total Time (min): 19 min    Billable Minutes:Evaluation 11  Self Care/Home Management 8    2/20/2022     No

## 2024-12-02 NOTE — ASSESSMENT & PLAN NOTE
Cr 2.3 (baseline 1.1).  Patient dry on physical exam.  Poor oral intake, most likely prerenal in origin.    Recent Labs   Lab 03/23/23  2113 03/24/23  0419 03/25/23  0236    146* 148*   K 3.4* 3.6 3.5    111* 112*   CO2 28 22* 24   BUN 24* 22 19   CREATININE 1.7* 1.7* 1.2       Plan:  - strict I/O  - daily weights  - encourage PO intake  - CMP qd, trend Cr  - renally dose all medications  - avoid nephrotoxic agents, NSAIDs, IV contrast, ACE/ARB   Detail Level: Zone

## 2025-01-28 NOTE — PLAN OF CARE
Problem: Infection  Goal: Absence of Infection Signs and Symptoms  Outcome: Ongoing, Progressing     Problem: Glycemic Control Impaired (Sepsis/Septic Shock)  Goal: Blood Glucose Level Within Desired Range  Outcome: Ongoing, Progressing      36.8

## 2025-02-26 ENCOUNTER — PATIENT OUTREACH (OUTPATIENT)
Dept: ADMINISTRATIVE | Facility: HOSPITAL | Age: 80
End: 2025-02-26
Payer: MEDICAID

## 2025-02-26 NOTE — PROGRESS NOTES
.Health Maintenance Topic(s) Outreach Outcomes & Actions Taken:    Primary Care Appt - Outreach Outcomes & Actions Taken  : pt is in a long term care facility

## 2025-04-25 NOTE — ED NOTES
Glucose 351   Per [employer]   - pt having cyclic vomiting at home, not eating or taking meds at home.  knows patient very well, available for any questions at [phone] (Callie) ext. [phone].          Emili Shankar RN  04/25/25 4633

## (undated) DEVICE — DURAPREP SURG SCRUB 26ML

## (undated) DEVICE — SYS INFLATION BLLN KYPHON

## (undated) DEVICE — DRESSING TELFA N ADH 3X8

## (undated) DEVICE — PAD DEFIB CADENCE ADULT R2

## (undated) DEVICE — TRAY DRY SKIN SCRUB PREP

## (undated) DEVICE — NDL HYPO REG 25G X 1 1/2

## (undated) DEVICE — BLADE ELECTRO EDGE INSULATED

## (undated) DEVICE — SUT VICRYL PLUS 2-0 CT1 18

## (undated) DEVICE — GOWN POLY REINF BRTH SLV XL

## (undated) DEVICE — BLLN SYNFLATE 15X19MM

## (undated) DEVICE — ADHESIVE DERMABOND ADVANCED

## (undated) DEVICE — SUT 4/0 18IN NUROLON BLK B

## (undated) DEVICE — UNDERGLOVES BIOGEL PI SZ 7 LF

## (undated) DEVICE — VALVE CONTROL COPILOT

## (undated) DEVICE — DRAPE XRAY EQUIPMENT UNIV

## (undated) DEVICE — DRESSING TRANS 4X4 TEGADERM

## (undated) DEVICE — DRAPE ABDOMINAL TIBURON 14X11

## (undated) DEVICE — Device

## (undated) DEVICE — CONTRAST VISIPAQUE 150ML

## (undated) DEVICE — SUT MCRYL PLUS 4-0 PS2 27IN

## (undated) DEVICE — KIT INTRODUCER W/GUIDEWIRE

## (undated) DEVICE — KIT LEFT HEART MANIFOLD CUSTOM

## (undated) DEVICE — GUIDE LAUNCHER 6FR JR 4.0

## (undated) DEVICE — SOL IRR SOD CHL .9% POUR

## (undated) DEVICE — CATH TREK RX 2.50MM X 15MM

## (undated) DEVICE — CATH GUIDE LINER  V3 6F

## (undated) DEVICE — SUT SILK 2-0 SH 18IN BLACK

## (undated) DEVICE — BINDER ABDOMINAL 9 30-45

## (undated) DEVICE — CATH MICRO CORSAIR PRO 135CM

## (undated) DEVICE — GLOVE BIOGEL SKINSENSE PI 6.0

## (undated) DEVICE — DRAPE SURG W/TWL 17 5/8X23

## (undated) DEVICE — GUIDEWIRE RUNTHROUGH EF 300CM

## (undated) DEVICE — DRAPE STERI INSTRUMENT 1018

## (undated) DEVICE — KIT ACC VERT AUG DMND TIP 10G

## (undated) DEVICE — COVER PROBE US 5.5X58L NON LTX

## (undated) DEVICE — SKIN MARKER DEVON 160

## (undated) DEVICE — HEMOSTAT VASC BAND REG 24CM

## (undated) DEVICE — ELECTRODE REM PLYHSV RETURN 9

## (undated) DEVICE — STAPLER SKIN ROTATING HEAD

## (undated) DEVICE — GLOVE BIOGEL SKINSENSE PI 6.5

## (undated) DEVICE — INFLATOR ENCORE 26 BLLN INFL

## (undated) DEVICE — KIT GLIDESHEATH SLEND 6FR 10CM

## (undated) DEVICE — CATH OPTITORQUE TIGER 5F 100CM

## (undated) DEVICE — SUT VICRYL PLUS 3-0 SH 18IN

## (undated) DEVICE — SEE MEDLINE ITEM 157187

## (undated) DEVICE — DRAPE TOP 53X102IN

## (undated) DEVICE — ELECTRODE BLADE INSULATED 1 IN

## (undated) DEVICE — UNDERGLOVE BIOGEL PI SZ 6.5 LF

## (undated) DEVICE — SET MICRO PUNCT 4FR/MPIS-401

## (undated) DEVICE — SYR ONLY LUER LOCK 20CC

## (undated) DEVICE — DRAPE INCISE IOBAN 2 23X17IN